# Patient Record
Sex: MALE | Race: WHITE
[De-identification: names, ages, dates, MRNs, and addresses within clinical notes are randomized per-mention and may not be internally consistent; named-entity substitution may affect disease eponyms.]

---

## 2018-05-20 ENCOUNTER — TRANSCRIPTION ENCOUNTER (OUTPATIENT)
Age: 68
End: 2018-05-20

## 2018-06-06 ENCOUNTER — INPATIENT (INPATIENT)
Facility: HOSPITAL | Age: 68
LOS: 2 days | Discharge: HOME | DRG: 603 | End: 2018-06-09
Attending: INTERNAL MEDICINE | Admitting: INTERNAL MEDICINE
Payer: MEDICARE

## 2018-06-06 VITALS
OXYGEN SATURATION: 98 % | SYSTOLIC BLOOD PRESSURE: 173 MMHG | DIASTOLIC BLOOD PRESSURE: 83 MMHG | HEART RATE: 83 BPM | TEMPERATURE: 98 F | WEIGHT: 210.1 LBS | RESPIRATION RATE: 18 BRPM

## 2018-06-06 DIAGNOSIS — L03.119 CELLULITIS OF UNSPECIFIED PART OF LIMB: ICD-10-CM

## 2018-06-06 LAB
ALBUMIN SERPL ELPH-MCNC: 3.8 G/DL — SIGNIFICANT CHANGE UP (ref 3.3–5)
ALP SERPL-CCNC: 69 U/L — SIGNIFICANT CHANGE UP (ref 40–120)
ALT FLD-CCNC: 5 U/L — LOW (ref 10–45)
ANION GAP SERPL CALC-SCNC: 14 MMOL/L — SIGNIFICANT CHANGE UP (ref 5–17)
APTT BLD: 33.5 SEC — SIGNIFICANT CHANGE UP (ref 27.5–37.4)
AST SERPL-CCNC: 13 U/L — SIGNIFICANT CHANGE UP (ref 10–40)
BASOPHILS # BLD AUTO: 0 K/UL — SIGNIFICANT CHANGE UP (ref 0–0.2)
BASOPHILS NFR BLD AUTO: 0.6 % — SIGNIFICANT CHANGE UP (ref 0–2)
BILIRUB SERPL-MCNC: 0.4 MG/DL — SIGNIFICANT CHANGE UP (ref 0.2–1.2)
BUN SERPL-MCNC: 35 MG/DL — HIGH (ref 7–23)
CALCIUM SERPL-MCNC: 8.9 MG/DL — SIGNIFICANT CHANGE UP (ref 8.4–10.5)
CHLORIDE SERPL-SCNC: 97 MMOL/L — SIGNIFICANT CHANGE UP (ref 96–108)
CK SERPL-CCNC: 98 U/L — SIGNIFICANT CHANGE UP (ref 30–200)
CO2 SERPL-SCNC: 30 MMOL/L — SIGNIFICANT CHANGE UP (ref 22–31)
CREAT SERPL-MCNC: 1.51 MG/DL — HIGH (ref 0.5–1.3)
EOSINOPHIL # BLD AUTO: 0.2 K/UL — SIGNIFICANT CHANGE UP (ref 0–0.5)
EOSINOPHIL NFR BLD AUTO: 2.6 % — SIGNIFICANT CHANGE UP (ref 0–6)
GAS PNL BLDV: SIGNIFICANT CHANGE UP
GLUCOSE BLDC GLUCOMTR-MCNC: 131 MG/DL — HIGH (ref 70–99)
GLUCOSE SERPL-MCNC: 165 MG/DL — HIGH (ref 70–99)
HCT VFR BLD CALC: 38.7 % — LOW (ref 39–50)
HGB BLD-MCNC: 12.4 G/DL — LOW (ref 13–17)
INR BLD: 1.14 RATIO — SIGNIFICANT CHANGE UP (ref 0.88–1.16)
LYMPHOCYTES # BLD AUTO: 1.5 K/UL — SIGNIFICANT CHANGE UP (ref 1–3.3)
LYMPHOCYTES # BLD AUTO: 20.3 % — SIGNIFICANT CHANGE UP (ref 13–44)
MCHC RBC-ENTMCNC: 28.1 PG — SIGNIFICANT CHANGE UP (ref 27–34)
MCHC RBC-ENTMCNC: 32 GM/DL — SIGNIFICANT CHANGE UP (ref 32–36)
MCV RBC AUTO: 87.9 FL — SIGNIFICANT CHANGE UP (ref 80–100)
MONOCYTES # BLD AUTO: 0.4 K/UL — SIGNIFICANT CHANGE UP (ref 0–0.9)
MONOCYTES NFR BLD AUTO: 5.8 % — SIGNIFICANT CHANGE UP (ref 2–14)
NEUTROPHILS # BLD AUTO: 5.4 K/UL — SIGNIFICANT CHANGE UP (ref 1.8–7.4)
NEUTROPHILS NFR BLD AUTO: 70.8 % — SIGNIFICANT CHANGE UP (ref 43–77)
PLATELET # BLD AUTO: 269 K/UL — SIGNIFICANT CHANGE UP (ref 150–400)
POTASSIUM SERPL-MCNC: 4.2 MMOL/L — SIGNIFICANT CHANGE UP (ref 3.5–5.3)
POTASSIUM SERPL-SCNC: 4.2 MMOL/L — SIGNIFICANT CHANGE UP (ref 3.5–5.3)
PROT SERPL-MCNC: 7.2 G/DL — SIGNIFICANT CHANGE UP (ref 6–8.3)
PROTHROM AB SERPL-ACNC: 12.5 SEC — SIGNIFICANT CHANGE UP (ref 9.8–12.7)
RBC # BLD: 4.4 M/UL — SIGNIFICANT CHANGE UP (ref 4.2–5.8)
RBC # FLD: 12.4 % — SIGNIFICANT CHANGE UP (ref 10.3–14.5)
SODIUM SERPL-SCNC: 141 MMOL/L — SIGNIFICANT CHANGE UP (ref 135–145)
WBC # BLD: 7.6 K/UL — SIGNIFICANT CHANGE UP (ref 3.8–10.5)
WBC # FLD AUTO: 7.6 K/UL — SIGNIFICANT CHANGE UP (ref 3.8–10.5)

## 2018-06-06 PROCEDURE — 99285 EMERGENCY DEPT VISIT HI MDM: CPT | Mod: GC

## 2018-06-06 PROCEDURE — 93971 EXTREMITY STUDY: CPT | Mod: 26

## 2018-06-06 RX ORDER — FUROSEMIDE 40 MG
20 TABLET ORAL DAILY
Qty: 0 | Refills: 0 | Status: DISCONTINUED | OUTPATIENT
Start: 2018-06-06 | End: 2018-06-06

## 2018-06-06 RX ORDER — ACETAMINOPHEN 500 MG
1000 TABLET ORAL ONCE
Qty: 0 | Refills: 0 | Status: COMPLETED | OUTPATIENT
Start: 2018-06-06 | End: 2018-06-06

## 2018-06-06 RX ORDER — GABAPENTIN 400 MG/1
300 CAPSULE ORAL DAILY
Qty: 0 | Refills: 0 | Status: DISCONTINUED | OUTPATIENT
Start: 2018-06-06 | End: 2018-06-09

## 2018-06-06 RX ORDER — FUROSEMIDE 40 MG
40 TABLET ORAL DAILY
Qty: 0 | Refills: 0 | Status: DISCONTINUED | OUTPATIENT
Start: 2018-06-06 | End: 2018-06-07

## 2018-06-06 RX ORDER — ENOXAPARIN SODIUM 100 MG/ML
40 INJECTION SUBCUTANEOUS DAILY
Qty: 0 | Refills: 0 | Status: DISCONTINUED | OUTPATIENT
Start: 2018-06-06 | End: 2018-06-09

## 2018-06-06 RX ORDER — DEXTROSE 50 % IN WATER 50 %
25 SYRINGE (ML) INTRAVENOUS ONCE
Qty: 0 | Refills: 0 | Status: DISCONTINUED | OUTPATIENT
Start: 2018-06-06 | End: 2018-06-09

## 2018-06-06 RX ORDER — SODIUM CHLORIDE 9 MG/ML
1000 INJECTION INTRAMUSCULAR; INTRAVENOUS; SUBCUTANEOUS ONCE
Qty: 0 | Refills: 0 | Status: COMPLETED | OUTPATIENT
Start: 2018-06-06 | End: 2018-06-06

## 2018-06-06 RX ORDER — DEXTROSE 50 % IN WATER 50 %
12.5 SYRINGE (ML) INTRAVENOUS ONCE
Qty: 0 | Refills: 0 | Status: DISCONTINUED | OUTPATIENT
Start: 2018-06-06 | End: 2018-06-09

## 2018-06-06 RX ORDER — CEFAZOLIN SODIUM 1 G
1000 VIAL (EA) INJECTION EVERY 8 HOURS
Qty: 0 | Refills: 0 | Status: DISCONTINUED | OUTPATIENT
Start: 2018-06-06 | End: 2018-06-08

## 2018-06-06 RX ORDER — INSULIN LISPRO 100/ML
VIAL (ML) SUBCUTANEOUS
Qty: 0 | Refills: 0 | Status: DISCONTINUED | OUTPATIENT
Start: 2018-06-06 | End: 2018-06-09

## 2018-06-06 RX ORDER — DEXTROSE 50 % IN WATER 50 %
15 SYRINGE (ML) INTRAVENOUS ONCE
Qty: 0 | Refills: 0 | Status: DISCONTINUED | OUTPATIENT
Start: 2018-06-06 | End: 2018-06-09

## 2018-06-06 RX ORDER — GLUCAGON INJECTION, SOLUTION 0.5 MG/.1ML
1 INJECTION, SOLUTION SUBCUTANEOUS ONCE
Qty: 0 | Refills: 0 | Status: DISCONTINUED | OUTPATIENT
Start: 2018-06-06 | End: 2018-06-09

## 2018-06-06 RX ORDER — VANCOMYCIN HCL 1 G
1000 VIAL (EA) INTRAVENOUS ONCE
Qty: 0 | Refills: 0 | Status: COMPLETED | OUTPATIENT
Start: 2018-06-06 | End: 2018-06-06

## 2018-06-06 RX ORDER — ASPIRIN/CALCIUM CARB/MAGNESIUM 324 MG
81 TABLET ORAL DAILY
Qty: 0 | Refills: 0 | Status: DISCONTINUED | OUTPATIENT
Start: 2018-06-06 | End: 2018-06-09

## 2018-06-06 RX ORDER — AMLODIPINE BESYLATE 2.5 MG/1
5 TABLET ORAL DAILY
Qty: 0 | Refills: 0 | Status: DISCONTINUED | OUTPATIENT
Start: 2018-06-06 | End: 2018-06-06

## 2018-06-06 RX ORDER — LOSARTAN POTASSIUM 100 MG/1
50 TABLET, FILM COATED ORAL DAILY
Qty: 0 | Refills: 0 | Status: DISCONTINUED | OUTPATIENT
Start: 2018-06-06 | End: 2018-06-08

## 2018-06-06 RX ORDER — OXYCODONE AND ACETAMINOPHEN 5; 325 MG/1; MG/1
1 TABLET ORAL EVERY 12 HOURS
Qty: 0 | Refills: 0 | Status: DISCONTINUED | OUTPATIENT
Start: 2018-06-06 | End: 2018-06-09

## 2018-06-06 RX ORDER — ENOXAPARIN SODIUM 100 MG/ML
40 INJECTION SUBCUTANEOUS DAILY
Qty: 0 | Refills: 0 | Status: DISCONTINUED | OUTPATIENT
Start: 2018-06-06 | End: 2018-06-06

## 2018-06-06 RX ORDER — METOPROLOL TARTRATE 50 MG
25 TABLET ORAL DAILY
Qty: 0 | Refills: 0 | Status: DISCONTINUED | OUTPATIENT
Start: 2018-06-06 | End: 2018-06-09

## 2018-06-06 RX ORDER — SODIUM CHLORIDE 9 MG/ML
1000 INJECTION, SOLUTION INTRAVENOUS
Qty: 0 | Refills: 0 | Status: DISCONTINUED | OUTPATIENT
Start: 2018-06-06 | End: 2018-06-09

## 2018-06-06 RX ORDER — ZOLPIDEM TARTRATE 10 MG/1
5 TABLET ORAL AT BEDTIME
Qty: 0 | Refills: 0 | Status: DISCONTINUED | OUTPATIENT
Start: 2018-06-06 | End: 2018-06-09

## 2018-06-06 RX ADMIN — Medication 25 MILLIGRAM(S): at 20:02

## 2018-06-06 RX ADMIN — GABAPENTIN 300 MILLIGRAM(S): 400 CAPSULE ORAL at 23:17

## 2018-06-06 RX ADMIN — Medication 1000 MILLIGRAM(S): at 15:31

## 2018-06-06 RX ADMIN — Medication 400 MILLIGRAM(S): at 15:31

## 2018-06-06 RX ADMIN — OXYCODONE AND ACETAMINOPHEN 1 TABLET(S): 5; 325 TABLET ORAL at 23:47

## 2018-06-06 RX ADMIN — OXYCODONE AND ACETAMINOPHEN 1 TABLET(S): 5; 325 TABLET ORAL at 23:17

## 2018-06-06 RX ADMIN — SODIUM CHLORIDE 2000 MILLILITER(S): 9 INJECTION INTRAMUSCULAR; INTRAVENOUS; SUBCUTANEOUS at 15:31

## 2018-06-06 RX ADMIN — Medication 100 MILLIGRAM(S): at 23:17

## 2018-06-06 RX ADMIN — LOSARTAN POTASSIUM 50 MILLIGRAM(S): 100 TABLET, FILM COATED ORAL at 20:02

## 2018-06-06 RX ADMIN — Medication 250 MILLIGRAM(S): at 16:51

## 2018-06-06 RX ADMIN — Medication 40 MILLIGRAM(S): at 23:17

## 2018-06-06 NOTE — ED ADULT NURSE NOTE - CHPI ED SYMPTOMS NEG
no diarrhea/no headache/no chills/no fever/no rash/no shortness of breath/no vomiting/no decreased eating/drinking/no abdominal pain/no cough

## 2018-06-06 NOTE — ED ADULT NURSE REASSESSMENT NOTE - NS ED NURSE REASSESS COMMENT FT1
1815 report given to RN on floor pt never took any blood pressure meds prior to [facility] hospitalization at [facility] and [facility] normal blood pressure id in the 150s No meds given at this time for pt has not taken any meds outside the hospital Rn on the floor aware  pending transport  Shavonne

## 2018-06-06 NOTE — ED ADULT NURSE NOTE - OBJECTIVE STATEMENT
pt comes in a wheelchair bound for Polio as a child comes in with left leg Cellulitis treated as [place] for 9 days and d/c home on PO antibiotics and still red and swollen Pt refused to get in the bed initially and take off his pants and shirt.  Finally pt in the bed and large gown given pt still would not take off his shirt IV placed in the forearm and bloods and meds given as ordered Esauorn

## 2018-06-06 NOTE — ED PROVIDER NOTE - MEDICAL DECISION MAKING DETAILS
68M with left leg swelling/pain/redness in context of recent admission for cellulitis, symptoms worsening after doxycycline - cellulitis failing outpt therapy, ?vascular cause, anticipate admission. 68M with left leg swelling/pain/redness in context of recent admission for cellulitis, symptoms worsening after doxycycline - cellulitis failing outpt therapy, ?vascular cause, anticipate admission.  Attending Denny: 69 y/o male recently admitted at [facility] for concern for cellulitis. per pt extensive work up at [facility] and pt d/c on doxy. no sig improvement and pt reported worsening pain to left leg. no falls or trauma. on exam no crepitus. unlikely pyomyositis. pt with b/l LE swelling. denies any h/o CHF. pt failing o/p treatment for cellulitis. able to range joints making septic joint less likely. plan to admit

## 2018-06-06 NOTE — H&P ADULT - ASSESSMENT
pt with h/o htn, pt stopped  taking bp meds,  h/o  dm   admitted  with b/l leg cellulitis   on  iv ancef   labs in [facility], pending, h/o  sob/ cp, better now  dvt  ppx pt with h/o htn, pt stopped  taking bp meds,  h/o  dm   admitted  with b/l leg cellulitis/ edema   on  iv ancef  iv lasix   doppler  legs   labs in am,   echo, pending, h/o  sob/ cp, better now  dvt  ppx pt with h/o htn, pt stopped  taking bp meds,  h/o  dm,  polio, has  motorized  chair, morbid  obesity   admitted  with b/l leg cellulitis/, left  greater  than right/ edema   on  iv ancef  iv lasix   doppler  legs, no  dvt   labs in am,   echo, pending, h/o  sob/ cp, better now  dvt  ppx  may  be  c/c  stasis, pt   frustrated

## 2018-06-06 NOTE — ED ADULT TRIAGE NOTE - CHIEF COMPLAINT QUOTE
worsening left lower leg pain and swelling, was recently admitted to [facility] for 9 days for treatment of lower left leg cellulitis. not currently on any abx.

## 2018-06-06 NOTE — H&P ADULT - NSHPPHYSICALEXAM_GEN_ALL_CORE
PHYSICAL EXAMINATION:  Vital Signs Last 24 Hrs  T(C): 36.6 (06 Jun 2018 19:56), Max: 36.7 (06 Jun 2018 18:13)  T(F): 97.8 (06 Jun 2018 19:56), Max: 98 (06 Jun 2018 18:13)  HR: 75 (06 Jun 2018 19:56) (75 - 89)  BP: 189/98 (06 Jun 2018 19:56) (161/75 - 189/98)  BP(mean): --  RR: 18 (06 Jun 2018 19:56) (16 - 18)  SpO2: 97% (06 Jun 2018 19:56) (97% - 100%)  CAPILLARY BLOOD GLUCOSE            GENERAL: NAD, well-groomed,  HEAD:  atraumatic, normocephalic  EYES: sclera anicteric  ENMT: mucous membranes moist  NECK: supple, No JVD  CHEST/LUNG: clear to auscultation bilaterally;    no      rales   ,   no rhonchi,   HEART: normal S1, S2  ABDOMEN: BS+, soft, ND, NT   EXTREMITIES:    below  knee,    edema    b/l LEs  NEURO: awake, ,     moves all extremities  SKIN: no     rash PHYSICAL EXAMINATION:  Vital Signs Last 24 Hrs  T(C): 36.6 (06 Jun 2018 19:56), Max: 36.7 (06 Jun 2018 18:13)  T(F): 97.8 (06 Jun 2018 19:56), Max: 98 (06 Jun 2018 18:13)  HR: 75 (06 Jun 2018 19:56) (75 - 89)  BP: 189/98 (06 Jun 2018 19:56) (161/75 - 189/98)  BP(mean): --  RR: 18 (06 Jun 2018 19:56) (16 - 18)  SpO2: 97% (06 Jun 2018 19:56) (97% - 100%)  CAPILLARY BLOOD GLUCOSE            GENERAL: NAD, well-groomed,  HEAD:  atraumatic, normocephalic  EYES: sclera anicteric  ENMT: mucous membranes moist. left   greater  than right  NEURO: awake, ,     moves all extremities  SKIN: no     rash

## 2018-06-06 NOTE — ED PROVIDER NOTE - PHYSICAL EXAMINATION
Attending Denny: Gen: NAD, heent: atrauamtic, eomi, perrla, mmm, op pink, uvula midline, neck; nttp, no nuchal rigidity, chest: nttp, no crepitus, cv: rrr, no murmurs, lungs: ctab, abd: soft, nontender, nondistended, no peritoneal signs, +BS, no guarding, ext: wwp, neg homans, skin: erythema and warmth to left leg, compartments soft, no crepitus, atrophy of lower extremities, neuro: awake and alert, following commands, speech clear, sensation and strength intact, no focal deficits

## 2018-06-06 NOTE — ED PROVIDER NOTE - MUSCULOSKELETAL, MLM
Left leg with with redness, swelling, mild calf tenderness, overlying venous stasis changes. Small skin tear between toe on left side.

## 2018-06-06 NOTE — H&P ADULT - NSHPREVIEWOFSYSTEMS_GEN_ALL_CORE
REVIEW OF SYSTEMS:  CONSTITUTIONAL: No weakness,  no   fevers      RESPIRATORY:  No    shortness of breath  , no  wheezing  CARDIOVASCULAR: No chest pain,   no  palpitations, no  cough  GASTROINTESTINAL: No abdominal  pain, no  vomiting, no  diarrhea  NEUROLOGICAL: No  focal  weakness

## 2018-06-06 NOTE — ED PROVIDER NOTE - CARE PLAN
Principal Discharge DX:	Cellulitis and abscess of leg  Secondary Diagnosis:	Failure of outpatient treatment

## 2018-06-06 NOTE — H&P ADULT - NSHPLABSRESULTS_GEN_ALL_CORE
LABS:                        12.4   7.6   )-----------( 269      ( 06 Jun 2018 15:29 )             38.7     06-06    141  |  97  |  35<H>  ----------------------------<  165<H>  4.2   |  30  |  1.51<H>    Ca    8.9      06 Jun 2018 15:29    TPro  7.2  /  Alb  3.8  /  TBili  0.4  /  DBili  x   /  AST  13  /  ALT  5<L>  /  AlkPhos  69  06-06    PT/INR - ( 06 Jun 2018 15:29 )   PT: 12.5 sec;   INR: 1.14 ratio         PTT - ( 06 Jun 2018 15:29 )  PTT:33.5 sec  CARDIAC MARKERS ( 06 Jun 2018 15:29 )  x     / x     / 98 U/L / x     / x                06-06 @ 15:29  4.1  25

## 2018-06-06 NOTE — CONSULT NOTE ADULT - ASSESSMENT
pt with hx of polio with possible cellulitis who presented to et with increasing le edema and redness with possible cellulitis  pt also with hypertensive heart disease on no meds  add beta blocker/ace  doppler  abx as per ID  asa daily

## 2018-06-06 NOTE — CONSULT NOTE ADULT - SUBJECTIVE AND OBJECTIVE BOX
CHIEF COMPLAINT:Patient is a 68y old  Male who presents with a chief complaint of le edema/chest pain/sob.    HPI:68 year old man PMH DM, HTN, wheelchair bound from childhood polio p/w left leg pain. Was discharged a week ago from [facility] after admission for LLE cellulitis and fever. Was on doxycycline post discharge and completed course, but says that his leg has developed worsening swelling, pain and redness. No fevers, chest pain, shortness of breath. Says he also had ultrasounds and CT that were negative at [facility].  pt also c/o chest pain for the last few months was given meds for chf and htn and is not taking it.  last cardiac work up years ago.      PAST MEDICAL & SURGICAL HISTORY:  Polio      MEDICATIONS  (STANDING):  noted  MEDICATIONS  (PRN):      FAMILY HISTORY:      SOCIAL HISTORY:    [ ] Non-smoker  [ ] Smoker  [ ] Alcohol    Allergies    No Known Allergies    Intolerances    	    REVIEW OF SYSTEMS:  CONSTITUTIONAL: No fever, weight loss, or fatigue  EYES: No eye pain, visual disturbances, or discharge  ENT:  No difficulty hearing, tinnitus, vertigo; No sinus or throat pain  NECK: No pain or stiffness  RESPIRATORY: No cough, wheezing, chills or hemoptysis; + Shortness of Breath  CARDIOVASCULAR: + chest pain, palpitations, passing out, dizziness, or leg swelling  GASTROINTESTINAL: No abdominal or epigastric pain. No nausea, vomiting, or hematemesis; No diarrhea or constipation. No melena or hematochezia.  GENITOURINARY: No dysuria, frequency, hematuria, or incontinence  NEUROLOGICAL: No headaches, memory loss, loss of strength, numbness, or tremors  SKIN: No itching, burning, rashes, or lesions   LYMPH Nodes: No enlarged glands  ENDOCRINE: No heat or cold intolerance; No hair loss  MUSCULOSKELETAL: No joint pain or swelling; No muscle, back, or extremity pain  PSYCHIATRIC: No depression, anxiety, mood swings, or difficulty sleeping  HEME/LYMPH: No easy bruising, or bleeding gums  ALLERGY AND IMMUNOLOGIC: No hives or eczema	    [ ] All others negative	  [ ] Unable to obtain    PHYSICAL EXAM:  T(C): 36.6 (06-06-18 @ 14:23), Max: 36.6 (06-06-18 @ 14:23)  HR: 83 (06-06-18 @ 14:23) (83 - 83)  BP: 173/83 (06-06-18 @ 14:23) (173/83 - 173/83)  RR: 18 (06-06-18 @ 14:23) (18 - 18)  SpO2: 98% (06-06-18 @ 14:23) (98% - 98%)  Wt(kg): --  I&O's Summary      Appearance: Normal	  HEENT:   Normal oral mucosa, PERRL, EOMI	  Lymphatic: No lymphadenopathy  Cardiovascular: Normal S1 S2, No JVD, + murmurs, No edema  Respiratory: Lungs clear to auscultation	  Psychiatry: A & O x 3, Mood & affect appropriate  Gastrointestinal:  Soft, Non-tender, + BS	  Skin: No rashes, No ecchymoses, No cyanosis	  Neurologic: Non-focal  Extremities: Normal range of motion, No clubbing, cyanosis , + polio, + edema  Vascular: Peripheral pulses palpable 2+ bilaterally, erythema    TELEMETRY: 	    ECG:  	  RADIOLOGY:  OTHER: 	  	  LABS:	 	    CARDIAC MARKERS:  CARDIAC MARKERS ( 06 Jun 2018 15:29 )  x     / x     / 98 U/L / x     / x                                  12.4   7.6   )-----------( 269      ( 06 Jun 2018 15:29 )             38.7     06-06    141  |  97  |  35<H>  ----------------------------<  165<H>  4.2   |  30  |  1.51<H>    Ca    8.9      06 Jun 2018 15:29    TPro  7.2  /  Alb  3.8  /  TBili  0.4  /  DBili  x   /  AST  13  /  ALT  5<L>  /  AlkPhos  69  06-06    proBNP:   Lipid Profile:   HgA1c:   TSH:   PT/INR - ( 06 Jun 2018 15:29 )   PT: 12.5 sec;   INR: 1.14 ratio         PTT - ( 06 Jun 2018 15:29 )  PTT:33.5 sec    PREVIOUS DIAGNOSTIC TESTING:      ecg nsr septal infarct

## 2018-06-06 NOTE — ED ADULT NURSE NOTE - CHIEF COMPLAINT QUOTE
worsening left lower leg pain and swelling, was recently admitted to [facility] for 9 days for treatment of lower left leg cellulitis. not currently on any abx.

## 2018-06-06 NOTE — H&P ADULT - HISTORY OF PRESENT ILLNESS
High Risk Travel:  International Travel? No(1)    	  : 68 year old man PMH DM, HTN, wheelchair bound from childhood polio p/w left leg pain also  has  h/o  vague  cp/  sob . Was discharged a week ago from [facility] after admission for LLE cellulitis and fever.  Was on doxycycline post discharge and completed course, but says that his leg has developed worsening swelling, pain and redness. No fevers, chest pain, shortness of breath. Says he also had ultrasounds and CT that were negative at [facility].

## 2018-06-06 NOTE — ED PROVIDER NOTE - OBJECTIVE STATEMENT
68 year old man PMH DM, HTN, wheelchair bound from childhood polio p/w left leg pain. Was discharged a week ago from [facility] after admission for LLE cellulitis and fever. Was on doxycycline post discharge and completed course, but says that his leg has developed worsening swelling, pain and redness. No fevers, chest pain, shortness of breath. Says he also had ultrasounds and CT that were negative at [facility].

## 2018-06-07 LAB
ANION GAP SERPL CALC-SCNC: 12 MMOL/L — SIGNIFICANT CHANGE UP (ref 5–17)
BUN SERPL-MCNC: 33 MG/DL — HIGH (ref 7–23)
CALCIUM SERPL-MCNC: 8.7 MG/DL — SIGNIFICANT CHANGE UP (ref 8.4–10.5)
CHLORIDE SERPL-SCNC: 103 MMOL/L — SIGNIFICANT CHANGE UP (ref 96–108)
CHOLEST SERPL-MCNC: 158 MG/DL — SIGNIFICANT CHANGE UP (ref 10–199)
CO2 SERPL-SCNC: 28 MMOL/L — SIGNIFICANT CHANGE UP (ref 22–31)
CREAT SERPL-MCNC: 1.47 MG/DL — HIGH (ref 0.5–1.3)
GLUCOSE SERPL-MCNC: 142 MG/DL — HIGH (ref 70–99)
HBA1C BLD-MCNC: 6.6 % — HIGH (ref 4–5.6)
HCT VFR BLD CALC: 35.4 % — LOW (ref 39–50)
HDLC SERPL-MCNC: 37 MG/DL — LOW (ref 40–125)
HGB BLD-MCNC: 11.2 G/DL — LOW (ref 13–17)
LIPID PNL WITH DIRECT LDL SERPL: 91 MG/DL — SIGNIFICANT CHANGE UP
MCHC RBC-ENTMCNC: 27.8 PG — SIGNIFICANT CHANGE UP (ref 27–34)
MCHC RBC-ENTMCNC: 31.6 GM/DL — LOW (ref 32–36)
MCV RBC AUTO: 87.8 FL — SIGNIFICANT CHANGE UP (ref 80–100)
PLATELET # BLD AUTO: 252 K/UL — SIGNIFICANT CHANGE UP (ref 150–400)
POTASSIUM SERPL-MCNC: 4 MMOL/L — SIGNIFICANT CHANGE UP (ref 3.5–5.3)
POTASSIUM SERPL-SCNC: 4 MMOL/L — SIGNIFICANT CHANGE UP (ref 3.5–5.3)
RBC # BLD: 4.03 M/UL — LOW (ref 4.2–5.8)
RBC # FLD: 13.6 % — SIGNIFICANT CHANGE UP (ref 10.3–14.5)
SODIUM SERPL-SCNC: 143 MMOL/L — SIGNIFICANT CHANGE UP (ref 135–145)
TOTAL CHOLESTEROL/HDL RATIO MEASUREMENT: 4.3 RATIO — SIGNIFICANT CHANGE UP (ref 3.4–9.6)
TRIGL SERPL-MCNC: 148 MG/DL — SIGNIFICANT CHANGE UP (ref 10–149)
TSH SERPL-MCNC: 2.39 UIU/ML — SIGNIFICANT CHANGE UP (ref 0.27–4.2)
WBC # BLD: 6.82 K/UL — SIGNIFICANT CHANGE UP (ref 3.8–10.5)
WBC # FLD AUTO: 6.82 K/UL — SIGNIFICANT CHANGE UP (ref 3.8–10.5)

## 2018-06-07 PROCEDURE — 93306 TTE W/DOPPLER COMPLETE: CPT | Mod: 26

## 2018-06-07 PROCEDURE — 73610 X-RAY EXAM OF ANKLE: CPT | Mod: 26,LT

## 2018-06-07 PROCEDURE — 73562 X-RAY EXAM OF KNEE 3: CPT | Mod: 26,LT

## 2018-06-07 PROCEDURE — 73590 X-RAY EXAM OF LOWER LEG: CPT | Mod: 26,LT

## 2018-06-07 RX ORDER — FUROSEMIDE 40 MG
40 TABLET ORAL
Qty: 0 | Refills: 0 | Status: DISCONTINUED | OUTPATIENT
Start: 2018-06-07 | End: 2018-06-09

## 2018-06-07 RX ADMIN — Medication 81 MILLIGRAM(S): at 12:57

## 2018-06-07 RX ADMIN — Medication 100 MILLIGRAM(S): at 06:40

## 2018-06-07 RX ADMIN — Medication 100 MILLIGRAM(S): at 14:00

## 2018-06-07 RX ADMIN — LOSARTAN POTASSIUM 50 MILLIGRAM(S): 100 TABLET, FILM COATED ORAL at 06:22

## 2018-06-07 RX ADMIN — OXYCODONE AND ACETAMINOPHEN 1 TABLET(S): 5; 325 TABLET ORAL at 23:29

## 2018-06-07 RX ADMIN — GABAPENTIN 300 MILLIGRAM(S): 400 CAPSULE ORAL at 12:57

## 2018-06-07 RX ADMIN — Medication 25 MILLIGRAM(S): at 06:22

## 2018-06-07 RX ADMIN — OXYCODONE AND ACETAMINOPHEN 1 TABLET(S): 5; 325 TABLET ORAL at 17:40

## 2018-06-07 RX ADMIN — Medication 40 MILLIGRAM(S): at 16:40

## 2018-06-07 RX ADMIN — Medication 100 MILLIGRAM(S): at 21:40

## 2018-06-07 RX ADMIN — OXYCODONE AND ACETAMINOPHEN 1 TABLET(S): 5; 325 TABLET ORAL at 16:40

## 2018-06-07 RX ADMIN — Medication 40 MILLIGRAM(S): at 06:22

## 2018-06-07 NOTE — PROVIDER CONTACT NOTE (OTHER) - ASSESSMENT
Pt agitated in bed, refusing FS, states he castorena snot want ADA diet or FS, Pt becoming increasingly agitated and yelling at RN

## 2018-06-07 NOTE — CONSULT NOTE ADULT - ASSESSMENT
68 year old male with hx of polio wheel chair bound, recent admission at outside hospital was dx with LLE cellulitis reports he was there for nine days and then followed by a four day of doxy and keflex. He now c/o LLE pain. He has no fever and his white cell count is normal, he is non toxic appearing. In my opinion he has received and adequate course of abx for cellulitis, the current erythema on his leg may also be a venous stasis dermatitis.   plan   Recommend getting ortho to comment on his leg pain, he may need further imaging of his left leg to determine cause of pain will defer to medicine   Ancef started empiric by team continue for now pending further input by medical team

## 2018-06-07 NOTE — CHART NOTE - NSCHARTNOTEFT_GEN_A_CORE
Pt with c/o LLE pain from foot to calf, along with "purple" discoloration.   Pt examined at bedside. LLE noted to be dark red and not purple as   stated by pt. Unable to palpate DP & PT pulses.  D/w Dr Singleton. Orthopedic and Vascular Consults called. KATRIN/PVR ordered  as recommended by Vascular MD. Xr Lt knee, Lt tib/fib and Lt ankle ordered.  Per Dr Pardo - Ortho MD, all Xrays to be resulted and Ortho recalled to see pt   after results are obtained. Dr Chapman ([facility] MD0 to see pt.  Endorsed to night LIP.

## 2018-06-07 NOTE — PROVIDER CONTACT NOTE (OTHER) - SITUATION
Pt a&ox4, pmh DM2, refusing FS/insulin. Earlier this shift refused ADA diet and requested regular diet. Diet changed per NP

## 2018-06-07 NOTE — CONSULT NOTE ADULT - SUBJECTIVE AND OBJECTIVE BOX
HPI:   Patient is a 68y male with 68 year old man PMH DM, HTN, wheelchair bound from childhood polio p/w left leg pain was discharged a week ago from [facility] after admission for LLE cellulitis and fever. The patient reports that he was in [facility] for nine days, he reports that he saw an infectious disease md and vascular surgery while he was there. He then continued to take oral antibiotics doxycycline and keflex for four more days which he reports that he completed. [facility]     REVIEW OF SYSTEMS:  All other review of systems negative (Comprehensive ROS)    PAST MEDICAL & SURGICAL HISTORY:  Polio      Allergies    No Known Allergies    Intolerances        Antimicrobials Day #    ceFAZolin   IVPB 1000 milliGRAM(s) IV Intermittent every 8 hours    Other Medications:  aspirin enteric coated 81 milliGRAM(s) Oral daily  dextrose 40% Gel 15 Gram(s) Oral once PRN  dextrose 5%. 1000 milliLiter(s) IV Continuous <Continuous>  dextrose 50% Injectable 12.5 Gram(s) IV Push once  dextrose 50% Injectable 25 Gram(s) IV Push once  dextrose 50% Injectable 25 Gram(s) IV Push once  enoxaparin Injectable 40 milliGRAM(s) SubCutaneous daily  furosemide   Injectable 40 milliGRAM(s) IV Push two times a day  gabapentin 300 milliGRAM(s) Oral daily  glucagon  Injectable 1 milliGRAM(s) IntraMuscular once PRN  insulin lispro (HumaLOG) corrective regimen sliding scale   SubCutaneous three times a day before meals  losartan 50 milliGRAM(s) Oral daily  metoprolol succinate ER 25 milliGRAM(s) Oral daily  oxyCODONE    5 mG/acetaminophen 325 mG 1 Tablet(s) Oral every 12 hours PRN  zolpidem 5 milliGRAM(s) Oral at bedtime      FAMILY HISTORY: non contributory       SOCIAL HISTORY:  Smoking:  non smoker     T(F): 97.8 (06-07-18 @ 12:36), Max: 98.7 (06-06-18 @ 22:01)  HR: 72 (06-07-18 @ 12:36)  BP: 175/82 (06-07-18 @ 12:36)  RR: 18 (06-07-18 @ 12:36)  SpO2: 98% (06-07-18 @ 12:36)  Wt(kg): --    PHYSICAL EXAM:  General: alert, no acute distress  Eyes:  anicteric, no conjunctival injection, no discharge  Oropharynx: no lesions or injection 	  Neck: supple, without adenopathy  Lungs: clear to auscultation  Heart: regular rate and rhythm; no murmur, rubs or gallops  Abdomen: soft, nondistended, nontender, without mass or organomegaly  Skin: no lesions  Extremities: no clubbing, cyanosis, or edema  Neurologic: alert, oriented, moves all extremities    LAB RESULTS:                        11.2   6.82  )-----------( 252      ( 07 Jun 2018 07:39 )             35.4     06-07    143  |  103  |  33<H>  ----------------------------<  142<H>  4.0   |  28  |  1.47<H>    Ca    8.7      07 Jun 2018 05:30    TPro  7.2  /  Alb  3.8  /  TBili  0.4  /  DBili  x   /  AST  13  /  ALT  5<L>  /  AlkPhos  69  06-06    LIVER FUNCTIONS - ( 06 Jun 2018 15:29 )  Alb: 3.8 g/dL / Pro: 7.2 g/dL / ALK PHOS: 69 U/L / ALT: 5 U/L / AST: 13 U/L / GGT: x               MICROBIOLOGY:  RECENT CULTURES:        RADIOLOGY REVIEWED:

## 2018-06-08 PROCEDURE — 93923 UPR/LXTR ART STDY 3+ LVLS: CPT | Mod: 26

## 2018-06-08 PROCEDURE — 93970 EXTREMITY STUDY: CPT | Mod: 26

## 2018-06-08 PROCEDURE — 99221 1ST HOSP IP/OBS SF/LOW 40: CPT

## 2018-06-08 RX ORDER — CEPHALEXIN 500 MG
500 CAPSULE ORAL
Qty: 0 | Refills: 0 | Status: DISCONTINUED | OUTPATIENT
Start: 2018-06-08 | End: 2018-06-09

## 2018-06-08 RX ORDER — LOSARTAN POTASSIUM 100 MG/1
100 TABLET, FILM COATED ORAL DAILY
Qty: 0 | Refills: 0 | Status: DISCONTINUED | OUTPATIENT
Start: 2018-06-08 | End: 2018-06-09

## 2018-06-08 RX ADMIN — LOSARTAN POTASSIUM 50 MILLIGRAM(S): 100 TABLET, FILM COATED ORAL at 05:33

## 2018-06-08 RX ADMIN — Medication 100 MILLIGRAM(S): at 05:35

## 2018-06-08 RX ADMIN — Medication 25 MILLIGRAM(S): at 05:33

## 2018-06-08 RX ADMIN — Medication 40 MILLIGRAM(S): at 17:25

## 2018-06-08 RX ADMIN — LOSARTAN POTASSIUM 100 MILLIGRAM(S): 100 TABLET, FILM COATED ORAL at 17:24

## 2018-06-08 RX ADMIN — OXYCODONE AND ACETAMINOPHEN 1 TABLET(S): 5; 325 TABLET ORAL at 23:21

## 2018-06-08 RX ADMIN — Medication 500 MILLIGRAM(S): at 23:22

## 2018-06-08 RX ADMIN — Medication 81 MILLIGRAM(S): at 13:53

## 2018-06-08 RX ADMIN — Medication 500 MILLIGRAM(S): at 13:57

## 2018-06-08 RX ADMIN — GABAPENTIN 300 MILLIGRAM(S): 400 CAPSULE ORAL at 13:53

## 2018-06-08 RX ADMIN — Medication 500 MILLIGRAM(S): at 17:27

## 2018-06-08 RX ADMIN — Medication 40 MILLIGRAM(S): at 05:34

## 2018-06-08 RX ADMIN — OXYCODONE AND ACETAMINOPHEN 1 TABLET(S): 5; 325 TABLET ORAL at 00:24

## 2018-06-08 NOTE — DIETITIAN INITIAL EVALUATION ADULT. - ORAL INTAKE PTA
good/Pt reports good PO intake PTA, consumes a variety of foods. Did not wish to elaborate on diet recall. Confirms NKFA. Denies micronutrient supplementation. Pt denies chewing/swallowing difficulty, nausea, vomiting, diarrhea, constipation.

## 2018-06-08 NOTE — CONSULT NOTE ADULT - SUBJECTIVE AND OBJECTIVE BOX
HPI: 69 yo wheelchair bound man with HTN, DM presenting with bilateral LE cellulitis. Vascular surgery consulted to evaluate for PVD because patient was complaining of severe LE pain.     PMHx / PSHx: see HPI    Medications (inpatient): aspirin enteric coated 81 milliGRAM(s) Oral daily  ceFAZolin   IVPB 1000 milliGRAM(s) IV Intermittent every 8 hours  dextrose 5%. 1000 milliLiter(s) IV Continuous <Continuous>  dextrose 50% Injectable 12.5 Gram(s) IV Push once  dextrose 50% Injectable 25 Gram(s) IV Push once  dextrose 50% Injectable 25 Gram(s) IV Push once  enoxaparin Injectable 40 milliGRAM(s) SubCutaneous daily  furosemide   Injectable 40 milliGRAM(s) IV Push two times a day  gabapentin 300 milliGRAM(s) Oral daily  insulin lispro (HumaLOG) corrective regimen sliding scale   SubCutaneous three times a day before meals  losartan 50 milliGRAM(s) Oral daily  metoprolol succinate ER 25 milliGRAM(s) Oral daily  zolpidem 5 milliGRAM(s) Oral at bedtime    Medications (PRN):dextrose 40% Gel 15 Gram(s) Oral once PRN  glucagon  Injectable 1 milliGRAM(s) IntraMuscular once PRN  oxyCODONE    5 mG/acetaminophen 325 mG 1 Tablet(s) Oral every 12 hours PRN    Allergies: No Known Allergies  (Intolerances: )  Social Hx:     Physical Exam  T(C): 36.8 (06-08-18 @ 05:31)  HR: 67 (06-08-18 @ 05:31) (67 - 75)  BP: 155/67 (06-08-18 @ 05:31) (154/83 - 175/82)  RR: 17 (06-08-18 @ 05:31) (17 - 18)  SpO2: 95% (06-08-18 @ 05:31) (95% - 98%)  Tmax: T(C): , Max: 37.1 (06-07-18 @ 19:27)    06-07-18  -  06-08-18  --------------------------------------------------------  IN:    IV PiggyBack: 100 mL    Oral Fluid: 1070 mL  Total IN: 1170 mL    OUT:    Voided: 2100 mL  Total OUT: 2100 mL    Total NET: -930 mL        General: well developed, well nourished, NAD  Ext: Bilateral fem pulse. R DP / PT signal. Left monophasic AT.     Labs:                        11.2   6.82  )-----------( 252      ( 07 Jun 2018 07:39 )             35.4     PT/INR - ( 06 Jun 2018 15:29 )   PT: 12.5 sec;   INR: 1.14 ratio         PTT - ( 06 Jun 2018 15:29 )  PTT:33.5 sec  06-07    143  |  103  |  33<H>  ----------------------------<  142<H>  4.0   |  28  |  1.47<H>    Ca    8.7      07 Jun 2018 05:30    TPro  7.2  /  Alb  3.8  /  TBili  0.4  /  DBili  x   /  AST  13  /  ALT  5<L>  /  AlkPhos  69  06-06            Imaging and other studies:

## 2018-06-08 NOTE — DIETITIAN INITIAL EVALUATION ADULT. - ENERGY NEEDS
Ht: 69 inches, Wt: 209lbs, BMI: 31kg/m2, IBW: 160lbs +/- 10%, %IBW: 130%  +2 right/left leg Edema, Skin: free of pressure injuries   Pertinent Information: This 67 yo wheelchair bound man with HTN, DM presenting with bilateral LE cellulitis. Vascular surgery consulted to evaluate for PVD because patient was complaining of severe LE pain.

## 2018-06-08 NOTE — DIETITIAN INITIAL EVALUATION ADULT. - OTHER INFO
Patient seen for verbal consult. Patient denies any recent changes in weight loss. Reports UBW 210lbs with is consistent with admit weight of 209.6 pounds. Patient currently with no GI distress. Reports decreased PO intake in-house due to dislike of institutionalized food also noted missing items ordered on tray. Does not want to be placed on a therapeutic diet while in-house.  Writer obtained food preferences will honor as able. Last BM 6/6.

## 2018-06-08 NOTE — CONSULT NOTE ADULT - ASSESSMENT
69 yo wheelchair-bound man 2/2 polio, presenting with bilateral LE cellulitis and edema.     - KATRIN / PVR  - venous duplex  - Elevation, ACE bandage  - Discussed with Fellow, Dr. New, on behalf of Dr. Garfield Chapman MD PGY2  Vascular: [id number]

## 2018-06-08 NOTE — DIETITIAN INITIAL EVALUATION ADULT. - NS AS NUTRI INTERV ED CONTENT
Purpose of the nutrition education/deferred diet education, pt appeared frustrated with menu, let written materials at bedside.

## 2018-06-08 NOTE — DIETITIAN INITIAL EVALUATION ADULT. - NS AS NUTRI INTERV MEALS SNACK
Continue Regular diet per pt request. Reviewed withpt menu ordering procedures as well as additional cold menu items available on back of menu. Obtained food preferences, will honor as able./General/healthful diet

## 2018-06-09 ENCOUNTER — TRANSCRIPTION ENCOUNTER (OUTPATIENT)
Age: 68
End: 2018-06-09

## 2018-06-09 VITALS
DIASTOLIC BLOOD PRESSURE: 88 MMHG | SYSTOLIC BLOOD PRESSURE: 169 MMHG | TEMPERATURE: 98 F | OXYGEN SATURATION: 96 % | HEART RATE: 67 BPM | RESPIRATION RATE: 18 BRPM

## 2018-06-09 LAB
HCT VFR BLD CALC: 37 % — LOW (ref 39–50)
HGB BLD-MCNC: 11.5 G/DL — LOW (ref 13–17)
MCHC RBC-ENTMCNC: 27.1 PG — SIGNIFICANT CHANGE UP (ref 27–34)
MCHC RBC-ENTMCNC: 31.1 GM/DL — LOW (ref 32–36)
MCV RBC AUTO: 87.1 FL — SIGNIFICANT CHANGE UP (ref 80–100)
PLATELET # BLD AUTO: 255 K/UL — SIGNIFICANT CHANGE UP (ref 150–400)
RBC # BLD: 4.25 M/UL — SIGNIFICANT CHANGE UP (ref 4.2–5.8)
RBC # FLD: 13.8 % — SIGNIFICANT CHANGE UP (ref 10.3–14.5)
WBC # BLD: 7.65 K/UL — SIGNIFICANT CHANGE UP (ref 3.8–10.5)
WBC # FLD AUTO: 7.65 K/UL — SIGNIFICANT CHANGE UP (ref 3.8–10.5)

## 2018-06-09 PROCEDURE — 85730 THROMBOPLASTIN TIME PARTIAL: CPT

## 2018-06-09 PROCEDURE — 82330 ASSAY OF CALCIUM: CPT

## 2018-06-09 PROCEDURE — 83036 HEMOGLOBIN GLYCOSYLATED A1C: CPT

## 2018-06-09 PROCEDURE — 85027 COMPLETE CBC AUTOMATED: CPT

## 2018-06-09 PROCEDURE — 84132 ASSAY OF SERUM POTASSIUM: CPT

## 2018-06-09 PROCEDURE — 85610 PROTHROMBIN TIME: CPT

## 2018-06-09 PROCEDURE — 80053 COMPREHEN METABOLIC PANEL: CPT

## 2018-06-09 PROCEDURE — 96374 THER/PROPH/DIAG INJ IV PUSH: CPT

## 2018-06-09 PROCEDURE — 93306 TTE W/DOPPLER COMPLETE: CPT

## 2018-06-09 PROCEDURE — 93005 ELECTROCARDIOGRAM TRACING: CPT

## 2018-06-09 PROCEDURE — 82803 BLOOD GASES ANY COMBINATION: CPT

## 2018-06-09 PROCEDURE — 73610 X-RAY EXAM OF ANKLE: CPT

## 2018-06-09 PROCEDURE — 82550 ASSAY OF CK (CPK): CPT

## 2018-06-09 PROCEDURE — 82435 ASSAY OF BLOOD CHLORIDE: CPT

## 2018-06-09 PROCEDURE — 80061 LIPID PANEL: CPT

## 2018-06-09 PROCEDURE — 84443 ASSAY THYROID STIM HORMONE: CPT

## 2018-06-09 PROCEDURE — 80048 BASIC METABOLIC PNL TOTAL CA: CPT

## 2018-06-09 PROCEDURE — 87040 BLOOD CULTURE FOR BACTERIA: CPT

## 2018-06-09 PROCEDURE — 99285 EMERGENCY DEPT VISIT HI MDM: CPT | Mod: 25

## 2018-06-09 PROCEDURE — 82962 GLUCOSE BLOOD TEST: CPT

## 2018-06-09 PROCEDURE — 82947 ASSAY GLUCOSE BLOOD QUANT: CPT

## 2018-06-09 PROCEDURE — 93970 EXTREMITY STUDY: CPT

## 2018-06-09 PROCEDURE — 73562 X-RAY EXAM OF KNEE 3: CPT

## 2018-06-09 PROCEDURE — 93923 UPR/LXTR ART STDY 3+ LVLS: CPT

## 2018-06-09 PROCEDURE — 85014 HEMATOCRIT: CPT

## 2018-06-09 PROCEDURE — 84295 ASSAY OF SERUM SODIUM: CPT

## 2018-06-09 PROCEDURE — 93971 EXTREMITY STUDY: CPT

## 2018-06-09 PROCEDURE — 96375 TX/PRO/DX INJ NEW DRUG ADDON: CPT

## 2018-06-09 PROCEDURE — 73590 X-RAY EXAM OF LOWER LEG: CPT

## 2018-06-09 PROCEDURE — 83605 ASSAY OF LACTIC ACID: CPT

## 2018-06-09 RX ORDER — FUROSEMIDE 40 MG
60 TABLET ORAL DAILY
Qty: 0 | Refills: 0 | Status: DISCONTINUED | OUTPATIENT
Start: 2018-06-09 | End: 2018-06-09

## 2018-06-09 RX ORDER — METOPROLOL TARTRATE 50 MG
1 TABLET ORAL
Qty: 30 | Refills: 0 | OUTPATIENT
Start: 2018-06-09 | End: 2018-07-08

## 2018-06-09 RX ORDER — FUROSEMIDE 40 MG
3 TABLET ORAL
Qty: 90 | Refills: 0 | OUTPATIENT
Start: 2018-06-09 | End: 2018-07-08

## 2018-06-09 RX ORDER — LOSARTAN POTASSIUM 100 MG/1
1 TABLET, FILM COATED ORAL
Qty: 30 | Refills: 0 | OUTPATIENT
Start: 2018-06-09 | End: 2018-07-08

## 2018-06-09 RX ORDER — CEPHALEXIN 500 MG
1 CAPSULE ORAL
Qty: 8 | Refills: 0 | OUTPATIENT
Start: 2018-06-09 | End: 2018-06-10

## 2018-06-09 RX ORDER — ASPIRIN/CALCIUM CARB/MAGNESIUM 324 MG
1 TABLET ORAL
Qty: 30 | Refills: 0 | OUTPATIENT
Start: 2018-06-09 | End: 2018-07-08

## 2018-06-09 RX ORDER — ZOLPIDEM TARTRATE 10 MG/1
1 TABLET ORAL
Qty: 5 | Refills: 0 | OUTPATIENT
Start: 2018-06-09 | End: 2018-06-13

## 2018-06-09 RX ORDER — AMLODIPINE BESYLATE 2.5 MG/1
1 TABLET ORAL
Qty: 0 | Refills: 0 | COMMUNITY

## 2018-06-09 RX ADMIN — Medication 500 MILLIGRAM(S): at 11:44

## 2018-06-09 RX ADMIN — GABAPENTIN 300 MILLIGRAM(S): 400 CAPSULE ORAL at 11:52

## 2018-06-09 RX ADMIN — Medication 500 MILLIGRAM(S): at 06:37

## 2018-06-09 RX ADMIN — Medication 60 MILLIGRAM(S): at 11:50

## 2018-06-09 RX ADMIN — Medication 25 MILLIGRAM(S): at 06:37

## 2018-06-09 RX ADMIN — LOSARTAN POTASSIUM 100 MILLIGRAM(S): 100 TABLET, FILM COATED ORAL at 06:37

## 2018-06-09 RX ADMIN — Medication 40 MILLIGRAM(S): at 06:37

## 2018-06-09 RX ADMIN — OXYCODONE AND ACETAMINOPHEN 1 TABLET(S): 5; 325 TABLET ORAL at 00:00

## 2018-06-09 RX ADMIN — Medication 81 MILLIGRAM(S): at 11:44

## 2018-06-09 NOTE — PROGRESS NOTE ADULT - SUBJECTIVE AND OBJECTIVE BOX
c/c pain  left foot    REVIEW OF SYSTEMS:  CONSTITUTIONAL: No weakness,  no   fevers      RESPIRATORY:  No    shortness of breath  , no  wheezing  CARDIOVASCULAR: No chest pain,   no  palpitations, no  cough  GASTROINTESTINAL: No abdominal  pain, no  vomiting, no  diarrhea  NEUROLOGICAL: No  focal  weakness    MEDICATIONS  (STANDING):  aspirin enteric coated 81 milliGRAM(s) Oral daily  cephalexin 500 milliGRAM(s) Oral four times a day  dextrose 5%. 1000 milliLiter(s) (50 mL/Hr) IV Continuous <Continuous>  dextrose 50% Injectable 12.5 Gram(s) IV Push once  dextrose 50% Injectable 25 Gram(s) IV Push once  dextrose 50% Injectable 25 Gram(s) IV Push once  enoxaparin Injectable 40 milliGRAM(s) SubCutaneous daily  furosemide   Injectable 40 milliGRAM(s) IV Push two times a day  gabapentin 300 milliGRAM(s) Oral daily  insulin lispro (HumaLOG) corrective regimen sliding scale   SubCutaneous three times a day before meals  losartan 100 milliGRAM(s) Oral daily  metoprolol succinate ER 25 milliGRAM(s) Oral daily  zolpidem 5 milliGRAM(s) Oral at bedtime    MEDICATIONS  (PRN):  dextrose 40% Gel 15 Gram(s) Oral once PRN Blood Glucose LESS THAN 70 milliGRAM(s)/deciliter  glucagon  Injectable 1 milliGRAM(s) IntraMuscular once PRN Glucose LESS THAN 70 milligrams/deciliter  oxyCODONE    5 mG/acetaminophen 325 mG 1 Tablet(s) Oral every 12 hours PRN Moderate Pain (4 - 6)      Vital Signs Last 24 Hrs  T(C): 36.3 (09 Jun 2018 06:35), Max: 36.8 (08 Jun 2018 20:16)  T(F): 97.3 (09 Jun 2018 06:35), Max: 98.3 (08 Jun 2018 20:16)  HR: 64 (09 Jun 2018 06:35) (64 - 71)  BP: 158/82 (09 Jun 2018 06:35) (150/79 - 167/82)  BP(mean): --  RR: 18 (09 Jun 2018 06:35) (18 - 18)  SpO2: 98% (09 Jun 2018 06:35) (94% - 98%)  CAPILLARY BLOOD GLUCOSE        I&O's Summary    08 Jun 2018 07:01  -  09 Jun 2018 07:00  --------------------------------------------------------  IN: 837 mL / OUT: 2250 mL / NET: -1413 mL        PHYSICAL EXAM:  HEAD:  Atraumatic, Normocephalic  NECK: Supple, No   JVD  CHEST/LUNG:   no     rales,     no,    rhonchi  HEART: Regular rate and rhythm;         murmur  ABDOMEN: Soft, Nontender, ;   EXTREMITIES:   less     edema  NEUROLOGY:  alert    LABS:                        Hemoglobin A1C, Whole Blood: 6.6 % (06-07 @ 07:39)    Thyroid Stimulating Hormone, Serum: 2.39 uIU/mL (06-07 @ 07:54)          Consultant(s) Notes Reviewed:      Care Discussed with Consultants/Other Providers:
- Patient seen and examined.  - In summary, patient is a 68 year old man who presented with sob (2018 20:13)  - Today, patient is without complaints.         *****MEDICATIONS:    MEDICATIONS  (STANDING):  aspirin enteric coated 81 milliGRAM(s) Oral daily  ceFAZolin   IVPB 1000 milliGRAM(s) IV Intermittent every 8 hours  dextrose 5%. 1000 milliLiter(s) (50 mL/Hr) IV Continuous <Continuous>  dextrose 50% Injectable 12.5 Gram(s) IV Push once  dextrose 50% Injectable 25 Gram(s) IV Push once  dextrose 50% Injectable 25 Gram(s) IV Push once  enoxaparin Injectable 40 milliGRAM(s) SubCutaneous daily  furosemide   Injectable 40 milliGRAM(s) IV Push two times a day  gabapentin 300 milliGRAM(s) Oral daily  insulin lispro (HumaLOG) corrective regimen sliding scale   SubCutaneous three times a day before meals  losartan 50 milliGRAM(s) Oral daily  metoprolol succinate ER 25 milliGRAM(s) Oral daily  zolpidem 5 milliGRAM(s) Oral at bedtime    MEDICATIONS  (PRN):  dextrose 40% Gel 15 Gram(s) Oral once PRN Blood Glucose LESS THAN 70 milliGRAM(s)/deciliter  glucagon  Injectable 1 milliGRAM(s) IntraMuscular once PRN Glucose LESS THAN 70 milligrams/deciliter  oxyCODONE    5 mG/acetaminophen 325 mG 1 Tablet(s) Oral every 12 hours PRN Moderate Pain (4 - 6)           ***** REVIEW OF SYSTEM:  GEN: no fever, no chills, no pain  RESP: no SOB, no cough, no sputum  CVS: no chest pain, no palpitations, no edema  GI: no abdominal pain, no nausea, no vomiting, no constipation, no diarrhea  : no dysuria, no frequency  NEURO: no headache, no dizziness  PSYCH: no depression, not anxious  Derm : no itching, no rash         ***** VITAL SIGNS:  T(F): 98 (18 @ 06:21), Max: 98.7 (18 @ 22:01)  HR: 69 (18 @ 06:21) (69 - 98)  BP: 166/89 (18 @ 06:21) (161/75 - 189/98)  RR: 18 (18 @ 06:21) (16 - 18)  SpO2: 96% (18 @ 06:21) (95% - 100%)  Wt(kg): --  ,   I&O's Summary    2018 07:01  -  2018 07:00  --------------------------------------------------------  IN: 480 mL / OUT: 1925 mL / NET: -1445 mL             *****PHYSICAL EXAM:  GEN: A&O X 3 , NAD , comfortable  HEENT: NCAT, EOMI, MMM, no icterus  NECK: Supple, No JVD  CVS: S1S2 , regular , No M/R/G appreciated  PULM: CTA B/L,  no W/R/R appreciated  ABD.: soft. non tender, non distended,  bowel sounds present  Extrem: intact pulses , +edema   Derm: No rash or ecchymosis noted  PSYCH: normal mood, no depression, not anxious         *****LAB AND IMAGIN.2   6.82  )-----------( 252      ( 2018 07:39 )             35.4                   143  |  103  |  33<H>  ----------------------------<  142<H>  4.0   |  28  |  1.47<H>    Ca    8.7      2018 05:30    TPro  7.2  /  Alb  3.8  /  TBili  0.4  /  DBili  x   /  AST  13  /  ALT  5<L>  /  AlkPhos  69      PT/INR - ( 2018 15:29 )   PT: 12.5 sec;   INR: 1.14 ratio         PTT - ( 2018 15:29 )  PTT:33.5 sec       CARDIAC MARKERS ( 2018 15:29 )  x     / x     / 98 U/L / x     / x                    [All pertinent recent Imaging/Reports reviewed]         *****A S S E S S M E N T   A N D   P L A N :  68M with hx of polio with cellulitis vs venous stasis  hypertensive heart disease previously on no meds  started on beta blocker/ace  doppler no DVT  abx as per ID, leg improving  asa daily  await echo    __________________________  AFelipe Miranda D.O.
- Patient seen and examined.  - In summary, patient is a 68 year old man who presented with sob (2018 20:13)  - Today, patient is without complaints.         *****MEDICATIONS:    MEDICATIONS  (STANDING):  aspirin enteric coated 81 milliGRAM(s) Oral daily  cephalexin 500 milliGRAM(s) Oral four times a day  dextrose 5%. 1000 milliLiter(s) (50 mL/Hr) IV Continuous <Continuous>  dextrose 50% Injectable 12.5 Gram(s) IV Push once  dextrose 50% Injectable 25 Gram(s) IV Push once  dextrose 50% Injectable 25 Gram(s) IV Push once  enoxaparin Injectable 40 milliGRAM(s) SubCutaneous daily  furosemide    Tablet 60 milliGRAM(s) Oral daily  furosemide   Injectable 40 milliGRAM(s) IV Push two times a day  gabapentin 300 milliGRAM(s) Oral daily  insulin lispro (HumaLOG) corrective regimen sliding scale   SubCutaneous three times a day before meals  losartan 100 milliGRAM(s) Oral daily  metoprolol succinate ER 25 milliGRAM(s) Oral daily  zolpidem 5 milliGRAM(s) Oral at bedtime    MEDICATIONS  (PRN):  dextrose 40% Gel 15 Gram(s) Oral once PRN Blood Glucose LESS THAN 70 milliGRAM(s)/deciliter  glucagon  Injectable 1 milliGRAM(s) IntraMuscular once PRN Glucose LESS THAN 70 milligrams/deciliter  oxyCODONE    5 mG/acetaminophen 325 mG 1 Tablet(s) Oral every 12 hours PRN Moderate Pain (4 - 6)               ***** REVIEW OF SYSTEM:  GEN: no fever, no chills, no pain  RESP: no SOB, no cough, no sputum  CVS: no chest pain, no palpitations, no edema  GI: no abdominal pain, no nausea, no vomiting, no constipation, no diarrhea  : no dysuria, no frequency  NEURO: no headache, no dizziness  PSYCH: no depression, not anxious  Derm : no itching, no rash         ***** VITAL SIGNS:    T(F): 97.3 (18 @ 06:35), Max: 98.3 (18 @ 20:16)  HR: 64 (18 @ 06:35) (64 - 71)  BP: 158/82 (18 @ 06:35) (150/79 - 167/82)  RR: 18 (18 @ 06:35) (18 - 18)  SpO2: 98% (18 @ 06:35) (94% - 98%)  Wt(kg): --  ,   I&O's Summary    2018 07:01  -  2018 07:00  --------------------------------------------------------  IN: 837 mL / OUT: 2250 mL / NET: -1413 mL                   *****PHYSICAL EXAM:  GEN: A&O X 3 , NAD , comfortable  HEENT: NCAT, EOMI, MMM, no icterus  NECK: Supple, No JVD  CVS: S1S2 , regular , No M/R/G appreciated  PULM: CTA B/L,  no W/R/R appreciated  ABD.: soft. non tender, non distended,  bowel sounds present  Extrem: intact pulses , +edema   Derm: No rash or ecchymosis noted  PSYCH: normal mood, no depression, not anxious         *****LAB AND IMAGIN.5   7.65  )-----------( 255      ( 2018 08:25 )             37.0               [All pertinent recent Imaging/Reports reviewed]  < from: Transthoracic Echocardiogram (18 @ 08:48) >  Conclusions:  1. Normal left ventricular internal dimensions and wall  thicknesses.  2. Normal left ventricular systolic function. No segmental  wall motion abnormalities.  3. Normal diastolic function  4. Normal right ventricular size and function.  5. Estimated pulmonary artery systolic pressure equals 27  mm Hg, assuming right atrial pressure equals 8  mm Hg,  consistent with normal pulmonary pressures.    < end of copied text >         *****A S S E S S M E N T   A N D   P L A N :  68M with hx of polio with cellulitis vs venous stasis  hypertensive heart disease previously on no meds  started on beta blocker/RB  consider norvasc  doppler no DVT  abx as per ID, leg improving  asa daily  echo noted  dcp    __________________________  SABINO Miranda D.O.
- Patient seen and examined.  - In summary, patient is a 68 year old man who presented with sob (2018 20:13)  - Today, patient is without complaints.         *****MEDICATIONS:    MEDICATIONS  (STANDING):  aspirin enteric coated 81 milliGRAM(s) Oral daily  cephalexin 500 milliGRAM(s) Oral four times a day  dextrose 5%. 1000 milliLiter(s) (50 mL/Hr) IV Continuous <Continuous>  dextrose 50% Injectable 12.5 Gram(s) IV Push once  dextrose 50% Injectable 25 Gram(s) IV Push once  dextrose 50% Injectable 25 Gram(s) IV Push once  enoxaparin Injectable 40 milliGRAM(s) SubCutaneous daily  furosemide   Injectable 40 milliGRAM(s) IV Push two times a day  gabapentin 300 milliGRAM(s) Oral daily  insulin lispro (HumaLOG) corrective regimen sliding scale   SubCutaneous three times a day before meals  losartan 50 milliGRAM(s) Oral daily  metoprolol succinate ER 25 milliGRAM(s) Oral daily  zolpidem 5 milliGRAM(s) Oral at bedtime    MEDICATIONS  (PRN):  dextrose 40% Gel 15 Gram(s) Oral once PRN Blood Glucose LESS THAN 70 milliGRAM(s)/deciliter  glucagon  Injectable 1 milliGRAM(s) IntraMuscular once PRN Glucose LESS THAN 70 milligrams/deciliter  oxyCODONE    5 mG/acetaminophen 325 mG 1 Tablet(s) Oral every 12 hours PRN Moderate Pain (4 - 6)             ***** REVIEW OF SYSTEM:  GEN: no fever, no chills, no pain  RESP: no SOB, no cough, no sputum  CVS: no chest pain, no palpitations, no edema  GI: no abdominal pain, no nausea, no vomiting, no constipation, no diarrhea  : no dysuria, no frequency  NEURO: no headache, no dizziness  PSYCH: no depression, not anxious  Derm : no itching, no rash         ***** VITAL SIGNS:    T(F): 98.3 (18 @ 05:31), Max: 98.7 (18 @ 19:27)  HR: 67 (18 @ 05:31) (67 - 75)  BP: 155/67 (18 @ 05:31) (154/83 - 175/82)  RR: 17 (18 @ 05:31) (17 - 18)  SpO2: 95% (18 @ 05:31) (95% - 98%)  Wt(kg): --  ,   I&O's Summary    2018 07:01  -  2018 07:00  --------------------------------------------------------  IN: 1170 mL / OUT: 2100 mL / NET: -930 mL               *****PHYSICAL EXAM:  GEN: A&O X 3 , NAD , comfortable  HEENT: NCAT, EOMI, MMM, no icterus  NECK: Supple, No JVD  CVS: S1S2 , regular , No M/R/G appreciated  PULM: CTA B/L,  no W/R/R appreciated  ABD.: soft. non tender, non distended,  bowel sounds present  Extrem: intact pulses , +edema   Derm: No rash or ecchymosis noted  PSYCH: normal mood, no depression, not anxious         *****LAB AND IMAGIN.2   6.82  )-----------( 252      ( 2018 07:39 )             35.4               -    143  |  103  |  33<H>  ----------------------------<  142<H>  4.0   |  28  |  1.47<H>    Ca    8.7      2018 05:30    TPro  7.2  /  Alb  3.8  /  TBili  0.4  /  DBili  x   /  AST  13  /  ALT  5<L>  /  AlkPhos  69  06-06    PT/INR - ( 2018 15:29 )   PT: 12.5 sec;   INR: 1.14 ratio         PTT - ( 2018 15:29 )  PTT:33.5 sec       CARDIAC MARKERS ( 2018 15:29 )  x     / x     / 98 U/L / x     / x           [All pertinent recent Imaging/Reports reviewed]         *****A S S E S S M E N T   A N D   P L A N :  68M with hx of polio with cellulitis vs venous stasis  hypertensive heart disease previously on no meds  started on beta blocker/RB  will adjust prn  doppler no DVT  abx as per ID, leg improving  asa daily  await echo    __________________________  A. MELCHOR Miranda.
CC: f/u for LLE venosus stasis dermatitis    Patient reports that he has LLE pain when he gets up, noted swelling yesterday, but better today     REVIEW OF SYSTEMS:  All other review of systems negative (Comprehensive ROS)      T(F): 98.3 (06-08-18 @ 05:31), Max: 98.7 (06-07-18 @ 19:27)  HR: 67 (06-08-18 @ 05:31)  BP: 155/67 (06-08-18 @ 05:31)  RR: 17 (06-08-18 @ 05:31)  SpO2: 95% (06-08-18 @ 05:31)  Wt(kg): --    PHYSICAL EXAM:  General: alert, no acute distress  Eyes:  anicteric, no conjunctival injection, no discharge  Oropharynx: no lesions or injection 	  Neck: supple, without adenopathy  Lungs: clear to auscultation  Heart: regular rate and rhythm; no murmur, rubs or gallops  Abdomen: soft, nondistended, nontender, without mass or organomegaly  Skin: no lesions  Extremities: LLE he has multiple healed incision scars from prior multiple surgeries, faint erythema not warm to touch , appearance of venous stasis bilateral   Neurologic: alert, oriented, moves all extremities    LAB RESULTS:                        11.2   6.82  )-----------( 252      ( 07 Jun 2018 07:39 )             35.4     06-07    143  |  103  |  33<H>  ----------------------------<  142<H>  4.0   |  28  |  1.47<H>    Ca    8.7      07 Jun 2018 05:30    TPro  7.2  /  Alb  3.8  /  TBili  0.4  /  DBili  x   /  AST  13  /  ALT  5<L>  /  AlkPhos  69  06-06    LIVER FUNCTIONS - ( 06 Jun 2018 15:29 )  Alb: 3.8 g/dL / Pro: 7.2 g/dL / ALK PHOS: 69 U/L / ALT: 5 U/L / AST: 13 U/L / GGT: x             MICROBIOLOGY:  RECENT CULTURES:  06-06 @ 17:03 .Blood Blood-Peripheral     No growth to date.          RADIOLOGY REVIEWED:        Antimicrobials Day #    ceFAZolin   IVPB 1000 milliGRAM(s) IV Intermittent every 8 hours    Other Medications Reviewed
no cp/sob    REVIEW OF SYSTEMS:  CONSTITUTIONAL: No weakness,  no   fevers      RESPIRATORY:  No    shortness of breath  , no  wheezing  CARDIOVASCULAR: No chest pain,   no  palpitations, no  cough  GASTROINTESTINAL: No abdominal  pain, no  vomiting, no  diarrhea  NEUROLOGICAL: No  focal  weakness    MEDICATIONS  (STANDING):  aspirin enteric coated 81 milliGRAM(s) Oral daily  cephalexin 500 milliGRAM(s) Oral four times a day  dextrose 5%. 1000 milliLiter(s) (50 mL/Hr) IV Continuous <Continuous>  dextrose 50% Injectable 12.5 Gram(s) IV Push once  dextrose 50% Injectable 25 Gram(s) IV Push once  dextrose 50% Injectable 25 Gram(s) IV Push once  enoxaparin Injectable 40 milliGRAM(s) SubCutaneous daily  furosemide   Injectable 40 milliGRAM(s) IV Push two times a day  gabapentin 300 milliGRAM(s) Oral daily  insulin lispro (HumaLOG) corrective regimen sliding scale   SubCutaneous three times a day before meals  losartan 100 milliGRAM(s) Oral daily  metoprolol succinate ER 25 milliGRAM(s) Oral daily  zolpidem 5 milliGRAM(s) Oral at bedtime    MEDICATIONS  (PRN):  dextrose 40% Gel 15 Gram(s) Oral once PRN Blood Glucose LESS THAN 70 milliGRAM(s)/deciliter  glucagon  Injectable 1 milliGRAM(s) IntraMuscular once PRN Glucose LESS THAN 70 milligrams/deciliter  oxyCODONE    5 mG/acetaminophen 325 mG 1 Tablet(s) Oral every 12 hours PRN Moderate Pain (4 - 6)      Vital Signs Last 24 Hrs  T(C): 36.6 (08 Jun 2018 10:27), Max: 37.1 (07 Jun 2018 19:27)  T(F): 97.9 (08 Jun 2018 10:27), Max: 98.7 (07 Jun 2018 19:27)  HR: 71 (08 Jun 2018 10:27) (67 - 75)  BP: 150/79 (08 Jun 2018 10:27) (150/79 - 175/82)  BP(mean): --  RR: 18 (08 Jun 2018 10:27) (17 - 18)  SpO2: 94% (08 Jun 2018 10:27) (94% - 98%)  CAPILLARY BLOOD GLUCOSE        I&O's Summary    07 Jun 2018 07:01  -  08 Jun 2018 07:00  --------------------------------------------------------  IN: 1170 mL / OUT: 2100 mL / NET: -930 mL    08 Jun 2018 07:01  -  08 Jun 2018 12:33  --------------------------------------------------------  IN: 240 mL / OUT: 650 mL / NET: -410 mL        PHYSICAL EXAM:  HEAD:  Atraumatic, Normocephalic  NECK: Supple, No   JVD  CHEST/LUNG:   no     rales,     no,    rhonchi  HEART: Regular rate and rhythm;         murmur  ABDOMEN: Soft, Nontender, ;   EXTREMITIES:        edema, b/l,   polio  NEUROLOGY:  alert    LABS:                        11.2   6.82  )-----------( 252      ( 07 Jun 2018 07:39 )             35.4     06-07    143  |  103  |  33<H>  ----------------------------<  142<H>  4.0   |  28  |  1.47<H>    Ca    8.7      07 Jun 2018 05:30    TPro  7.2  /  Alb  3.8  /  TBili  0.4  /  DBili  x   /  AST  13  /  ALT  5<L>  /  AlkPhos  69  06-06    PT/INR - ( 06 Jun 2018 15:29 )   PT: 12.5 sec;   INR: 1.14 ratio         PTT - ( 06 Jun 2018 15:29 )  PTT:33.5 sec  CARDIAC MARKERS ( 06 Jun 2018 15:29 )  x     / x     / 98 U/L / x     / x                06-06 @ 15:29  4.1  25    Hemoglobin A1C, Whole Blood: 6.6 % (06-07 @ 07:39)    Thyroid Stimulating Hormone, Serum: 2.39 uIU/mL (06-07 @ 07:54)          Consultant(s) Notes Reviewed:      Care Discussed with Consultants/Other Providers:
pedal  edema , less  REVIEW OF SYSTEMS:  CONSTITUTIONAL: No weakness,  no   fevers      RESPIRATORY:  No    shortness of breath  , no  wheezing  CARDIOVASCULAR: No chest pain,   no  palpitations, no  cough  GASTROINTESTINAL: No abdominal  pain, no  vomiting, no  diarrhea  NEUROLOGICAL: No  focal  weakness    MEDICATIONS  (STANDING):  aspirin enteric coated 81 milliGRAM(s) Oral daily  ceFAZolin   IVPB 1000 milliGRAM(s) IV Intermittent every 8 hours  dextrose 5%. 1000 milliLiter(s) (50 mL/Hr) IV Continuous <Continuous>  dextrose 50% Injectable 12.5 Gram(s) IV Push once  dextrose 50% Injectable 25 Gram(s) IV Push once  dextrose 50% Injectable 25 Gram(s) IV Push once  enoxaparin Injectable 40 milliGRAM(s) SubCutaneous daily  furosemide   Injectable 40 milliGRAM(s) IV Push daily  gabapentin 300 milliGRAM(s) Oral daily  insulin lispro (HumaLOG) corrective regimen sliding scale   SubCutaneous three times a day before meals  losartan 50 milliGRAM(s) Oral daily  metoprolol succinate ER 25 milliGRAM(s) Oral daily  zolpidem 5 milliGRAM(s) Oral at bedtime    MEDICATIONS  (PRN):  dextrose 40% Gel 15 Gram(s) Oral once PRN Blood Glucose LESS THAN 70 milliGRAM(s)/deciliter  glucagon  Injectable 1 milliGRAM(s) IntraMuscular once PRN Glucose LESS THAN 70 milligrams/deciliter  oxyCODONE    5 mG/acetaminophen 325 mG 1 Tablet(s) Oral every 12 hours PRN Moderate Pain (4 - 6)      Vital Signs Last 24 Hrs  T(C): 36.7 (07 Jun 2018 06:21), Max: 37.1 (06 Jun 2018 22:01)  T(F): 98 (07 Jun 2018 06:21), Max: 98.7 (06 Jun 2018 22:01)  HR: 69 (07 Jun 2018 06:21) (69 - 98)  BP: 166/89 (07 Jun 2018 06:21) (161/75 - 189/98)  BP(mean): --  RR: 18 (07 Jun 2018 06:21) (16 - 18)  SpO2: 96% (07 Jun 2018 06:21) (95% - 100%)  CAPILLARY BLOOD GLUCOSE      POCT Blood Glucose.: 131 mg/dL (06 Jun 2018 21:58)    I&O's Summary    06 Jun 2018 07:01  -  07 Jun 2018 07:00  --------------------------------------------------------  IN: 480 mL / OUT: 1925 mL / NET: -1445 mL        PHYSICAL EXAM:  HEAD:  Atraumatic, Normocephalic  NECK: Supple, No   JVD  CHEST/LUNG:   no     rales,     no,    rhonchi  HEART: Regular rate and rhythm;         murmur  ABDOMEN: Soft, Nontender, ;   EXTREMITIES:  left  leg c/c redness/ edema  NEUROLOGY:  alert    LABS:                        11.2   6.82  )-----------( 252      ( 07 Jun 2018 07:39 )             35.4     06-07    143  |  103  |  33<H>  ----------------------------<  142<H>  4.0   |  28  |  1.47<H>    Ca    8.7      07 Jun 2018 05:30    TPro  7.2  /  Alb  3.8  /  TBili  0.4  /  DBili  x   /  AST  13  /  ALT  5<L>  /  AlkPhos  69  06-06    PT/INR - ( 06 Jun 2018 15:29 )   PT: 12.5 sec;   INR: 1.14 ratio         PTT - ( 06 Jun 2018 15:29 )  PTT:33.5 sec  CARDIAC MARKERS ( 06 Jun 2018 15:29 )  x     / x     / 98 U/L / x     / x                06-06 @ 15:29  4.1  25      Thyroid Stimulating Hormone, Serum: 2.39 uIU/mL (06-07 @ 07:54)          Consultant(s) Notes Reviewed:      Care Discussed with Consultants/Other Providers:

## 2018-06-09 NOTE — PROVIDER CONTACT NOTE (MEDICATION) - ASSESSMENT
Pt educated on bloodclots and risks of refusing lovenox, Pt educated that he is at increased risk of clot while hospitalized, still wishes to refuse
a&ox4, no chest pain, pain, sob, difficulty breathing noted,

## 2018-06-09 NOTE — DISCHARGE NOTE ADULT - PATIENT PORTAL LINK FT
You can access the [facility] Patient Portal, offered by [facility], by registering with the following website: [email or web address]

## 2018-06-09 NOTE — DISCHARGE NOTE ADULT - HOSPITAL COURSE
67 yo male  with h/o htn, pt stopped  taking bp meds,  h/o  dm,  polio, has  motorized  chair, morbid  obesity  admitted  with b/l leg cellulitis/, left  greater  than right/ edema  Treated with    keflex and IV  lasix.  Doppler  legs - no  dvt  - may  be  c/c  stasis. Seen  by vascular, has  pad, per  vascular, f/p a s  an op  Pt stable for dc home today  Instructed to follow up with PMD and Vascular surgeon  1  week.

## 2018-06-09 NOTE — DISCHARGE NOTE ADULT - CARE PROVIDER_API CALL
Sarah Merrill), Surgery  [address]  [address]  [place] [address]  Phone: [phone]  Fax: [phone]    Yevgeniy Mcdowell), Internal Medicine  [address] [place] [address]  Phone: [phone]  Fax: [phone]

## 2018-06-09 NOTE — PROGRESS NOTE ADULT - ASSESSMENT
pt with h/o htn, pt stopped  taking bp meds,  h/o  dm,  polio, has  motorized  chair, morbid  obesity   admitted  with b/l leg cellulitis/, left  greater  than right/ edema   on   keflex  iv lasix   doppler  legs, no  dvt   dvt  ppx  may  be  c/c  stasis, pt   frustrated  seen  by vascular, has  pad, per  vascular, f/p a s  an op   dc home today   pmd/ vascular,  1  week
68 year old male with hx of polio wheel chair bound, recent admission at outside hospital was dx with LLE cellulitis reports he was there for nine days and then followed by a four day of doxy and keflex. He now c/o LLE pain. He has no fever and his white cell count is normal, he is non toxic appearing. In my opinion he has received and adequate course of abx for cellulitis, the current erythema on his leg may also be a venous stasis dermatitis.   The etiology of pain is not clear, but as per medical literature review polio patient can experience pain, in ddx also includes Post polio pain, overuse pain, Biomechanical pain, neuropathic pain,  work up of pain to be determined by medical team and also vascular surgery is on the case evaluate for vascular insufficiency as cause of pain.  Perhaps Neurology input may be helpful will defer to medicine. Based in lab values, flow sheets vital and exam in my opinion there is no signs of infection.   plan   He is on Ancef empiric, will change to oral keflex for three more days
pt with h/o htn, pt stopped  taking bp meds,  h/o  dm,  polio, has  motorized  chair, morbid  obesity   admitted  with b/l leg cellulitis/, left  greater  than right/ edema   on  iv ancef  iv lasix   doppler  legs, no  dvt   echo, pending, h/o  sob/ cp, better now  dvt  ppx  may  be  c/c  stasis, pt   frustrated  increase  lasix  ID  called
pt with h/o htn, pt stopped  taking bp meds,  h/o  dm,  polio, has  motorized  chair, morbid  obesity   admitted  with b/l leg cellulitis/, left  greater  than right/ edema   on  iv ancef  iv lasix   doppler  legs, no  dvt   echo, pending, h/o  sob/ cp, better now  dvt  ppx  may  be  c/c  stasis, pt   frustrated  seen  by vascular, a waiting  tests  ID  called

## 2018-06-09 NOTE — DISCHARGE NOTE ADULT - MEDICATION SUMMARY - MEDICATIONS TO TAKE
I will START or STAY ON the medications listed below when I get home from the hospital:    Cialis 10 mg oral tablet  -- 1 tab(s) by mouth once a day  -- Indication: For Pulm Htn    aspirin 81 mg oral delayed release tablet  -- 1 tab(s) by mouth once a day  -- Indication: For Heart health    oxyCODONE-acetaminophen 5 mg-325 mg oral tablet  -- 1 tab(s) by mouth every 12 hours, As needed, Moderate Pain (4 - 6) MDD:2  -- Indication: For Moderate to severe pain    losartan 100 mg oral tablet  -- 1 tab(s) by mouth once a day  -- Indication: For Hypertension    gabapentin 300 mg oral capsule  -- 1 cap(s) by mouth once a day  -- Indication: For Pain    Kombiglyze XR 5 mg-1000 mg oral tablet, extended release  -- 1 tab(s) by mouth once a day  -- Indication: For Diabetes    glipiZIDE 10 mg oral tablet, extended release  -- 1 tab(s) by mouth once a day  -- Indication: For Diabetes    zolpidem 5 mg oral tablet  -- 1 tab(s) by mouth once a day (at bedtime), As Needed for sleep aid MDD:1  DO NOT DRIVE OR OPERATE MACHINERY WHILE TAKING THIS MEDICATION.  -- Indication: For Sleep aid    metoprolol succinate 25 mg oral tablet, extended release  -- 1 tab(s) by mouth once a day  -- Indication: For Hypertension    cephalexin 500 mg oral capsule  -- 1 cap(s) by mouth 4 times a day  -- Indication: For Cellulitis and abscess of leg    furosemide 20 mg oral tablet  -- 3 tab(s) by mouth once a day  -- Indication: For Water pill

## 2018-06-09 NOTE — DISCHARGE NOTE ADULT - MEDICATION SUMMARY - MEDICATIONS TO STOP TAKING
I will STOP taking the medications listed below when I get home from the hospital:    amLODIPine 5 mg oral tablet  -- 1 tab(s) by mouth once a day, As Needed

## 2018-06-09 NOTE — PROVIDER CONTACT NOTE (MEDICATION) - ACTION/TREATMENT ORDERED:
JENNIFER Rivera aware, RN cannot force Pt, Okay for Pt to refuse, Cont to monitor Pt and educate on DVT prophylaxis
continue plan of care, risk and benefits explained verbalized understanding.

## 2018-06-09 NOTE — DISCHARGE NOTE ADULT - ADDITIONAL INSTRUCTIONS
Continue all medications including antibiotics as prescribed.  Follow up with Vascular Surgery MD,  Dr. Merrill  and your Primary Care MD in 1 week. You will need to follow up with your internist for management of your   Diabetes and Hypertension.   Call to schedule appointments. Continue all medications including antibiotics as prescribed.  Keep ACR Wrap on your Lt leg (from toe to thigh) at all times.  Keep Left Leg elevated at all times.   Follow up with Vascular Surgery MD,  Dr. Merrill  and your Primary Care MD in 1 week. You will need to follow up with your internist for management of your   Diabetes and Hypertension.   Call to schedule appointments.

## 2018-06-09 NOTE — DISCHARGE NOTE ADULT - PLAN OF CARE
Cellulitis resolving. Continue antibiotics as prescribed.  Follow up with Vascular Surgery MD and your Primary Care MD in 1 week. Take your medication as prescribed.  Follow up with your medical doctor for routine blood pressure monitoring, and to establish long term blood pressure treatment goals.  Low salt diet  Activity as tolerated.  Notify your doctor if you have any of the following symptoms:   Dizziness, Lightheadedness, Blurry vision, Headache, Chest pain, Shortness of breath Your hemoglobin A1C (HgA1C)  was 6.6 this admission.  Make sure you get your HgA1c checked every three months.  If you take oral diabetes medications, check your blood glucose two times a day.  If you take insulin, check your blood glucose before meals and at bedtime.  It's important not to skip any meals.  Keep a log of your blood glucose results and always take it with you to your doctor appointments.  Keep a list of your current medications including injectables and over the counter medications and bring this medication list with you to all your doctor appointments.  If you have not seen your ophthalmologist this year call for appointment.  Check your feet daily for redness, sores, or openings. Do not self treat. If no improvement in two days call your primary care physician for an appointment.  Low blood sugar (hypoglycemia) is a blood sugar below 70mg/dl. Check your blood sugar if you feel signs/symptoms of hypoglycemia. If your blood sugar is below 70 take 15 grams of carbohydrates (ex 4 oz of apple juice, 3-4 glucose tablets, or 4-6 oz of regular soda) wait 15 minutes and repeat blood sugar to make sure it comes up above 70.  If your blood sugar is above 70 and you are due for a meal, have a meal.  If you are not due for a meal have a snack.  This snack helps keeps your blood sugar at a safe range.

## 2018-06-09 NOTE — DISCHARGE NOTE ADULT - CARE PLAN
Principal Discharge DX:	Cellulitis and abscess of leg  Goal:	Cellulitis resolving.  Assessment and plan of treatment:	Continue antibiotics as prescribed.  Follow up with Vascular Surgery MD and your Primary Care MD in 1 week.  Secondary Diagnosis:	Hypertension  Assessment and plan of treatment:	Take your medication as prescribed.  Follow up with your medical doctor for routine blood pressure monitoring, and to establish long term blood pressure treatment goals.  Low salt diet  Activity as tolerated.  Notify your doctor if you have any of the following symptoms:   Dizziness, Lightheadedness, Blurry vision, Headache, Chest pain, Shortness of breath  Secondary Diagnosis:	Diabetes  Assessment and plan of treatment:	Your hemoglobin A1C (HgA1C)  was 6.6 this admission.  Make sure you get your HgA1c checked every three months.  If you take oral diabetes medications, check your blood glucose two times a day.  If you take insulin, check your blood glucose before meals and at bedtime.  It's important not to skip any meals.  Keep a log of your blood glucose results and always take it with you to your doctor appointments.  Keep a list of your current medications including injectables and over the counter medications and bring this medication list with you to all your doctor appointments.  If you have not seen your ophthalmologist this year call for appointment.  Check your feet daily for redness, sores, or openings. Do not self treat. If no improvement in two days call your primary care physician for an appointment.  Low blood sugar (hypoglycemia) is a blood sugar below 70mg/dl. Check your blood sugar if you feel signs/symptoms of hypoglycemia. If your blood sugar is below 70 take 15 grams of carbohydrates (ex 4 oz of apple juice, 3-4 glucose tablets, or 4-6 oz of regular soda) wait 15 minutes and repeat blood sugar to make sure it comes up above 70.  If your blood sugar is above 70 and you are due for a meal, have a meal.  If you are not due for a meal have a snack.  This snack helps keeps your blood sugar at a safe range.

## 2018-06-11 LAB
CULTURE RESULTS: SIGNIFICANT CHANGE UP
CULTURE RESULTS: SIGNIFICANT CHANGE UP
SPECIMEN SOURCE: SIGNIFICANT CHANGE UP
SPECIMEN SOURCE: SIGNIFICANT CHANGE UP

## 2018-07-26 ENCOUNTER — INPATIENT (INPATIENT)
Facility: HOSPITAL | Age: 68
LOS: 3 days | Discharge: HOME | End: 2018-07-30
Attending: INTERNAL MEDICINE | Admitting: INTERNAL MEDICINE
Payer: MEDICARE

## 2018-07-26 VITALS
DIASTOLIC BLOOD PRESSURE: 72 MMHG | HEART RATE: 90 BPM | RESPIRATION RATE: 18 BRPM | OXYGEN SATURATION: 98 % | TEMPERATURE: 98 F | SYSTOLIC BLOOD PRESSURE: 157 MMHG

## 2018-07-26 DIAGNOSIS — R19.7 DIARRHEA, UNSPECIFIED: ICD-10-CM

## 2018-07-26 DIAGNOSIS — I10 ESSENTIAL (PRIMARY) HYPERTENSION: ICD-10-CM

## 2018-07-26 DIAGNOSIS — K92.2 GASTROINTESTINAL HEMORRHAGE, UNSPECIFIED: ICD-10-CM

## 2018-07-26 DIAGNOSIS — E11.9 TYPE 2 DIABETES MELLITUS WITHOUT COMPLICATIONS: ICD-10-CM

## 2018-07-26 DIAGNOSIS — N17.9 ACUTE KIDNEY FAILURE, UNSPECIFIED: ICD-10-CM

## 2018-07-26 DIAGNOSIS — D64.9 ANEMIA, UNSPECIFIED: ICD-10-CM

## 2018-07-26 PROBLEM — A80.9 ACUTE POLIOMYELITIS, UNSPECIFIED: Chronic | Status: ACTIVE | Noted: 2018-06-06

## 2018-07-26 LAB
ALBUMIN SERPL ELPH-MCNC: 3.3 G/DL — SIGNIFICANT CHANGE UP (ref 3.3–5)
ALP SERPL-CCNC: 78 U/L — SIGNIFICANT CHANGE UP (ref 40–120)
ALT FLD-CCNC: 9 U/L — SIGNIFICANT CHANGE UP (ref 4–41)
AST SERPL-CCNC: 15 U/L — SIGNIFICANT CHANGE UP (ref 4–40)
BASE EXCESS BLDV CALC-SCNC: 3.2 MMOL/L — SIGNIFICANT CHANGE UP
BASOPHILS # BLD AUTO: 0.02 K/UL — SIGNIFICANT CHANGE UP (ref 0–0.2)
BASOPHILS NFR BLD AUTO: 0.2 % — SIGNIFICANT CHANGE UP (ref 0–2)
BILIRUB SERPL-MCNC: 0.7 MG/DL — SIGNIFICANT CHANGE UP (ref 0.2–1.2)
BLOOD GAS VENOUS - CREATININE: 1.68 MG/DL — HIGH (ref 0.5–1.3)
BUN SERPL-MCNC: 30 MG/DL — HIGH (ref 7–23)
CALCIUM SERPL-MCNC: 8.2 MG/DL — LOW (ref 8.4–10.5)
CHLORIDE BLDV-SCNC: 99 MMOL/L — SIGNIFICANT CHANGE UP (ref 96–108)
CHLORIDE SERPL-SCNC: 95 MMOL/L — LOW (ref 98–107)
CO2 SERPL-SCNC: 25 MMOL/L — SIGNIFICANT CHANGE UP (ref 22–31)
CREAT SERPL-MCNC: 1.74 MG/DL — HIGH (ref 0.5–1.3)
EOSINOPHIL # BLD AUTO: 0.12 K/UL — SIGNIFICANT CHANGE UP (ref 0–0.5)
EOSINOPHIL NFR BLD AUTO: 1.3 % — SIGNIFICANT CHANGE UP (ref 0–6)
GAS PNL BLDV: 133 MMOL/L — LOW (ref 136–146)
GLUCOSE BLDV-MCNC: 184 — HIGH (ref 70–99)
GLUCOSE SERPL-MCNC: 174 MG/DL — HIGH (ref 70–99)
HCO3 BLDV-SCNC: 26 MMOL/L — SIGNIFICANT CHANGE UP (ref 20–27)
HCT VFR BLD CALC: 36.4 % — LOW (ref 39–50)
HCT VFR BLDV CALC: 38.5 % — LOW (ref 39–51)
HGB BLD-MCNC: 11.9 G/DL — LOW (ref 13–17)
HGB BLDV-MCNC: 12.5 G/DL — LOW (ref 13–17)
IMM GRANULOCYTES # BLD AUTO: 0.03 # — SIGNIFICANT CHANGE UP
IMM GRANULOCYTES NFR BLD AUTO: 0.3 % — SIGNIFICANT CHANGE UP (ref 0–1.5)
LACTATE BLDV-MCNC: 1.3 MMOL/L — SIGNIFICANT CHANGE UP (ref 0.5–2)
LYMPHOCYTES # BLD AUTO: 0.96 K/UL — LOW (ref 1–3.3)
LYMPHOCYTES # BLD AUTO: 10.4 % — LOW (ref 13–44)
MCHC RBC-ENTMCNC: 27.6 PG — SIGNIFICANT CHANGE UP (ref 27–34)
MCHC RBC-ENTMCNC: 32.7 % — SIGNIFICANT CHANGE UP (ref 32–36)
MCV RBC AUTO: 84.5 FL — SIGNIFICANT CHANGE UP (ref 80–100)
MONOCYTES # BLD AUTO: 1.24 K/UL — HIGH (ref 0–0.9)
MONOCYTES NFR BLD AUTO: 13.4 % — SIGNIFICANT CHANGE UP (ref 2–14)
NEUTROPHILS # BLD AUTO: 6.87 K/UL — SIGNIFICANT CHANGE UP (ref 1.8–7.4)
NEUTROPHILS NFR BLD AUTO: 74.4 % — SIGNIFICANT CHANGE UP (ref 43–77)
NRBC # FLD: 0 — SIGNIFICANT CHANGE UP
OB PNL STL: NEGATIVE — SIGNIFICANT CHANGE UP
PCO2 BLDV: 50 MMHG — SIGNIFICANT CHANGE UP (ref 41–51)
PH BLDV: 7.37 PH — SIGNIFICANT CHANGE UP (ref 7.32–7.43)
PLATELET # BLD AUTO: 221 K/UL — SIGNIFICANT CHANGE UP (ref 150–400)
PMV BLD: 10.9 FL — SIGNIFICANT CHANGE UP (ref 7–13)
PO2 BLDV: 31 MMHG — LOW (ref 35–40)
POTASSIUM BLDV-SCNC: 3.5 MMOL/L — SIGNIFICANT CHANGE UP (ref 3.4–4.5)
POTASSIUM SERPL-MCNC: 3.7 MMOL/L — SIGNIFICANT CHANGE UP (ref 3.5–5.3)
POTASSIUM SERPL-SCNC: 3.7 MMOL/L — SIGNIFICANT CHANGE UP (ref 3.5–5.3)
PROT SERPL-MCNC: 7.2 G/DL — SIGNIFICANT CHANGE UP (ref 6–8.3)
RBC # BLD: 4.31 M/UL — SIGNIFICANT CHANGE UP (ref 4.2–5.8)
RBC # FLD: 14.4 % — SIGNIFICANT CHANGE UP (ref 10.3–14.5)
SAO2 % BLDV: 52.8 % — LOW (ref 60–85)
SODIUM SERPL-SCNC: 134 MMOL/L — LOW (ref 135–145)
WBC # BLD: 9.24 K/UL — SIGNIFICANT CHANGE UP (ref 3.8–10.5)
WBC # FLD AUTO: 9.24 K/UL — SIGNIFICANT CHANGE UP (ref 3.8–10.5)

## 2018-07-26 PROCEDURE — 99223 1ST HOSP IP/OBS HIGH 75: CPT

## 2018-07-26 PROCEDURE — 74176 CT ABD & PELVIS W/O CONTRAST: CPT | Mod: 26

## 2018-07-26 RX ORDER — SODIUM CHLORIDE 9 MG/ML
1000 INJECTION INTRAMUSCULAR; INTRAVENOUS; SUBCUTANEOUS ONCE
Qty: 0 | Refills: 0 | Status: COMPLETED | OUTPATIENT
Start: 2018-07-26 | End: 2018-07-26

## 2018-07-26 RX ORDER — SODIUM CHLORIDE 9 MG/ML
1000 INJECTION, SOLUTION INTRAVENOUS
Qty: 0 | Refills: 0 | Status: DISCONTINUED | OUTPATIENT
Start: 2018-07-26 | End: 2018-07-30

## 2018-07-26 RX ORDER — METOCLOPRAMIDE HCL 10 MG
10 TABLET ORAL ONCE
Qty: 0 | Refills: 0 | Status: COMPLETED | OUTPATIENT
Start: 2018-07-26 | End: 2018-07-26

## 2018-07-26 RX ORDER — HYDRALAZINE HCL 50 MG
10 TABLET ORAL ONCE
Qty: 0 | Refills: 0 | Status: COMPLETED | OUTPATIENT
Start: 2018-07-26 | End: 2018-07-26

## 2018-07-26 RX ADMIN — SODIUM CHLORIDE 1000 MILLILITER(S): 9 INJECTION INTRAMUSCULAR; INTRAVENOUS; SUBCUTANEOUS at 17:03

## 2018-07-26 RX ADMIN — Medication 10 MILLIGRAM(S): at 19:21

## 2018-07-26 RX ADMIN — SODIUM CHLORIDE 100 MILLILITER(S): 9 INJECTION, SOLUTION INTRAVENOUS at 22:33

## 2018-07-26 RX ADMIN — Medication 10 MILLIGRAM(S): at 22:33

## 2018-07-26 NOTE — H&P ADULT - PROBLEM SELECTOR PLAN 4
BPs elevated. S/p IV hydralazine 10mg x1 in the ED.  - C/w metoprolol succinate 25mg once daily. Will hold losartan and lasix in setting of possible SONDRA and restart both once Cr stabilizes and if BP tolerates.  - Will do IV hydralazine PRN as needed if BP>170 Mild, with Hgb 11.9, and normocytic. H/H at baseline. No S/S of active bleed. FOBT negative. Possibly 2/2 anemia of chronic disease.  - Monitor H/H and transfuse for goal Hgb > 7  - Will hold ASA and pharmacologic DVT ppx for now given that pt reports dark stools. If H/H remains stable, will restart ASA and start HSQ for DVT ppx.  - Will hold off on PPI, as pt without any BMs/melena while in the ED and no report of abdominal pain

## 2018-07-26 NOTE — ED PROVIDER NOTE - OBJECTIVE STATEMENT
Pt is a 67yo M with a PMhx of T2DM, pulmonary HTN and polio as a child presenting with 4 day duration of diarrhea, weakness and fever. The pt was discharged from [facility] on June 7th for cellulitis of the leg and placed on cephalexin. He endorses continuing to take antibiotics recently, but unable to remember which antibiotics. He states that he has been having profuse watery diarrhea that has looked black in color and has had at least two episodes where he has been unable to make it to the bathroom. He also endorses diaphoresis, anorexia and a fever of 103F during this time. He denies any vomiting and any abdominal or chest pain. He also states that his SoB has not worsened during this time and has been improving over the past few months. Pt is a 67yo M with a PMhx of T2DM, pulmonary HTN and polio as a child presenting with 4 day duration of diarrhea, weakness and fever. The pt was discharged from [facility] on June 7th for cellulitis of the leg and placed on cephalexin. He endorses continuing to take antibiotics recently, but unable to remember which antibiotics. He states that he has been having profuse watery diarrhea that has looked black in color and has had at least two episodes where he has been unable to make it to the bathroom. He also endorses diaphoresis, anorexia and a fever of 103F during this time. He denies any vomiting and any abdominal or chest pain. He also states that his SoB has not worsened during this time and has been improving over the past few months.    Attending/Tim: 67 yo M as described above h/o childhood polio, PHTN, p/w several days of non-bloody diarrhea, generalize weakness, fatigue and fever. Pt denies abd pain, CP, N/V, HA, SOB or palp. Pt had been recently hotialized and treated for LE cellulitis.

## 2018-07-26 NOTE — ED PROVIDER NOTE - MEDICAL DECISION MAKING DETAILS
69yo M with PMHx of polio, pulmonary hypertension, and diabetes presenting with dark stools and watery diarrhea for the past 4 days. He will be resuscitated with a 1L bolus of NS. It is likely that he may have a C Diff. infection given his recent admissions to [place] with antibiotic use for his cellulitis. Will send labs and follow up.

## 2018-07-26 NOTE — H&P ADULT - NSHPSOCIALHISTORY_GEN_ALL_CORE
Tobacco: denies  EtOH: denies  Illicit drugs: denies  Lives with Tobacco: denies  EtOH: denies  Illicit drugs: denies  Lives with wife. Wheelchair bound.

## 2018-07-26 NOTE — ED PROVIDER NOTE - PHYSICAL EXAMINATION
Vascular: Radial pulses equal.  Extremities: Edema in both legs with left worse than right. Right leg shortened and ankle with minimal movement 2/2 polio. Skin is red moreso on the left. Slight pain on palpation. Does not feel warm to touch. Vascular: Radial pulses equal.  Extremities: Edema in both legs with left worse than right. Right leg shortened and ankle with minimal movement 2/2 polio. Skin is red moreso on the left. Slight pain on palpation. Does not feel warm to touch.    Attending/Tim: NAD, +dry mucosa; PERRL/EOMI, non-icterus, supple, no LIBERTY, no JVD, RRR, CTAB; Abd-soft, NT/ND, no HSM; trace LE edema, +chronic skin changes, warm/dry, A&Ox3, nonfocal

## 2018-07-26 NOTE — H&P ADULT - ASSESSMENT
67 yo man with h/o morbid obesity, HTN, DM2, childhood polio (now wheelchair bound), and recent admission (6/6-6/9/18) at [facility] for b/l LE cellulitis (s/p Ancef and Keflex) presents from home due to diarrhea. 67 yo man with h/o morbid obesity, HTN, DM2, medication nonadherence, childhood polio c/b poliomyelitis (now wheelchair bound), and recent admission (6/6-6/9/18) at [facility] for b/l LE cellulitis (s/p Ancef and Keflex) presents with diarrhea, admitted for r/o C diff colitis and possible SONDRA.

## 2018-07-26 NOTE — H&P ADULT - NSHPPHYSICALEXAM_GEN_ALL_CORE
Vital Signs Last 24 Hrs  T(C): 36.7 (26 Jul 2018 20:54), Max: 36.8 (26 Jul 2018 18:42)  T(F): 98.1 (26 Jul 2018 20:54), Max: 98.3 (26 Jul 2018 18:42)  HR: 90 (26 Jul 2018 20:54) (80 - 91)  BP: 174/74 (26 Jul 2018 20:54) (157/72 - 189/92)  BP(mean): --  RR: 18 (26 Jul 2018 20:54) (16 - 18)  SpO2: 99% (26 Jul 2018 20:54) (98% - 100%)    PHYSICAL EXAM:  General: No acute distress.  HEENT: Normocephalic, atraumatic.  PERRL.  EOMI.  No scleral icterus.  Moist MM.  No oropharyngeal exudates.    Neck: Supple.  Full range of motion.  No JVD.  No carotid bruits.  No thyromegaly.  Trachea midline.  No lymphadenopathy.   Heart: RRR.  Normal S1 and S2.  No murmurs, rubs, or gallops.   Lungs: CTAB. No wheezes, crackles, or rhonchi.    Abdomen: BS+, soft, NT/ND.  No organomegaly.  Skin: Warm and dry.  No rashes.  Extremities: No edema, clubbing, or cyanosis.  2+ peripheral pulses b/l.  Musculoskeletal: No deformities.  No spinal or paraspinal tenderness.  Neuro: A&Ox3.  CN II-XII intact.  5/5 strength in UE and LE b/l.  Tactile sensation intact in UE and LE b/l.  Cerebellar function intact as assessed by finger-to-nose test. Vital Signs Last 24 Hrs  T(C): 36.7 (26 Jul 2018 20:54), Max: 36.8 (26 Jul 2018 18:42)  T(F): 98.1 (26 Jul 2018 20:54), Max: 98.3 (26 Jul 2018 18:42)  HR: 90 (26 Jul 2018 20:54) (80 - 91)  BP: 174/74 (26 Jul 2018 20:54) (157/72 - 189/92)  BP(mean): --  RR: 18 (26 Jul 2018 20:54) (16 - 18)  SpO2: 99% (26 Jul 2018 20:54) (98% - 100%)    PHYSICAL EXAM:  General: No acute distress. Morbidly obese.   HEENT: PERRL.  EOMI.  No scleral icterus.  Moist MM.  No oropharyngeal exudates.    Neck: Supple.  Full range of motion.  No JVD.  No thyromegaly.  Trachea midline.  No lymphadenopathy.   Heart: RRR.  Normal S1 and S2.  No murmurs, rubs, or gallops.   Lungs: CTAB. No wheezes, crackles, or rhonchi.    Abdomen: BS+, soft, NT/ND.    Skin: Warm and dry.  No rashes.  Extremities: No edema, clubbing, or cyanosis.  2+ peripheral pulses b/l.  Musculoskeletal: No deformities.  No spinal or paraspinal tenderness.  Neuro: A&Ox3.  CN II-XII intact.  5/5 strength in UE and LE b/l.  Tactile sensation intact in UE and LE b/l.  Cerebellar function intact as assessed by finger-to-nose test. Vital Signs Last 24 Hrs  T(C): 36.7 (26 Jul 2018 20:54), Max: 36.8 (26 Jul 2018 18:42)  T(F): 98.1 (26 Jul 2018 20:54), Max: 98.3 (26 Jul 2018 18:42)  HR: 90 (26 Jul 2018 20:54) (80 - 91)  BP: 174/74 (26 Jul 2018 20:54) (157/72 - 189/92)  BP(mean): --  RR: 18 (26 Jul 2018 20:54) (16 - 18)  SpO2: 99% (26 Jul 2018 20:54) (98% - 100%)    PHYSICAL EXAM:  General: No acute distress. Morbidly obese.   HEENT: PERRL.  EOMI.  No scleral icterus.  Moist MM.  No oropharyngeal exudates.    Neck: Thick circumference.  Supple.  Full range of motion.  No JVD.  No lymphadenopathy.   Heart: RRR.  Normal S1 and S2.    Lungs: CTAB. No wheezes, crackles, or rhonchi.    Abdomen: BS+, soft, NT, mildly distended.  Skin: Warm and dry.  +Chronic venous stasis skin changes of LLE, no erythema, warmth, or tenderness.    Extremities: 2+ pitting edema of LLE (chronic), 1+ pitting edema of RLE, R ankle with old surgical scar from ankle fracture repair.  2+ radial pulses b/l.  1+ DP pulses b/l.  Neuro: A&Ox3.  Intact muscle strength of UEs b/l.  +Paraplegia.

## 2018-07-26 NOTE — H&P ADULT - PROBLEM SELECTOR PLAN 3
Mild, with Hgb 11.9, and normocytic. H/H at baseline. No S/S of active bleed. Possibly 2/2 anemia of chronic disease.  - Monitor H/H and transfuse for goal Hgb > 7 Mild, with Hgb 11.9, and normocytic. H/H at baseline. No S/S of active bleed. FOBT negative. Possibly 2/2 anemia of chronic disease.  - Monitor H/H and transfuse for goal Hgb > 7  - Will hold ASA and pharmacologic DVT ppx for now given that pt reports dark stools. If H/H remains stable, will restart ASA and start HSQ for DVT ppx. Mild, with Hgb 11.9, and normocytic. H/H at baseline. No S/S of active bleed. FOBT negative. Possibly 2/2 anemia of chronic disease.  - Monitor H/H and transfuse for goal Hgb > 7  - Will hold ASA and pharmacologic DVT ppx for now given that pt reports dark stools. If H/H remains stable, will restart ASA and start HSQ for DVT ppx.  - Will hold off on PPI, as pt without any BMs/melena while in the ED and no report of abdominal pain Cr 1.74 vs. 1.47 on 6/7/18. Unclear baseline. Can be SONDRA on CKD vs. SONDRA, likely 2/2 prerenal azotemia from dehydration 2/2 diarrhea.  - S/p 1L bolus of NS in the ED, now on maintenance IVF  - Monitor Cr and UOP. Avoid NSAIDs, contrast, other nephrotoxins. Renally dose meds.   - Will check urine lytes if Cr does not improve with IVF administration

## 2018-07-26 NOTE — ED PROVIDER NOTE - NEURO NEGATIVE STATEMENT, MLM
no loss of consciousness, no gait abnormality, no headache, no sensory deficits. Endorses feeling weaker than usual.

## 2018-07-26 NOTE — H&P ADULT - PROBLEM SELECTOR PLAN 7
- DVT ppx: Will hold pharmacologic DVT ppx for now given that pt reports dark stools. If H/H remains stable, will start HSQ for DVT ppx.  - Diet: NPO except for meds Pt with h/o medication nonadherence. Pt unaware of what medications he was recently taking but states that other than his diabetes medications, he has been off of all medications for at least 1 week.  - Will need to do a medication reconciliation, will request from Pharmacy

## 2018-07-26 NOTE — H&P ADULT - NSHPREVIEWOFSYSTEMS_GEN_ALL_CORE
REVIEW OF SYSTEMS:    CONSTITUTIONAL: No weakness, fevers or chills  EYES/ENT: No visual changes;  No vertigo or throat pain   NECK: No pain or stiffness  RESPIRATORY: No cough, wheezing, hemoptysis; No shortness of breath  CARDIOVASCULAR: No chest pain or palpitations  GASTROINTESTINAL: No abdominal or epigastric pain. No nausea, vomiting, or hematemesis; No diarrhea or constipation. No melena or hematochezia.  GENITOURINARY: No dysuria, frequency or hematuria  NEUROLOGICAL: No numbness or weakness  SKIN: No itching, burning, rashes, or lesions   All other review of systems is negative unless indicated above. REVIEW OF SYSTEMS:    CONSTITUTIONAL: +Fevers/chills.  EYES/ENT: No visual changes. No throat pain.  NECK: No pain or stiffness  RESPIRATORY: No cough, SOB, wheezing, hemoptysis  CARDIOVASCULAR: No chest pain or palpitations  GASTROINTESTINAL: +Diarrhea. No abdominal or epigastric pain. No nausea, vomiting, or hematemesis. No hematochezia.  GENITOURINARY: No dysuria, frequency, or hematuria  NEUROLOGICAL: B/l LE paralysis (from childhood polio)  SKIN: +Chronic venous stasis dermatitis of LLE. +Chronic LLE swelling. No itching, burning, or rashes.   All other review of systems is negative unless indicated above. REVIEW OF SYSTEMS:    CONSTITUTIONAL: +Fevers/chills.  EYES/ENT: No visual changes. No throat pain.  NECK: No pain or stiffness  RESPIRATORY: No cough, SOB, wheezing, hemoptysis  CARDIOVASCULAR: No chest pain or palpitations  GASTROINTESTINAL: +Diarrhea. No abdominal or epigastric pain. No nausea, vomiting, or hematemesis. No hematochezia.  GENITOURINARY: +Mild dysuria. No urinary frequency/urgency or hematuria.  NEUROLOGICAL: B/l LE paralysis (from childhood polio)  SKIN: +Chronic venous stasis dermatitis of LLE. +Chronic LLE swelling. No itching, burning, or rashes.   All other review of systems is negative unless indicated above.

## 2018-07-26 NOTE — ED ADULT TRIAGE NOTE - CHIEF COMPLAINT QUOTE
PMH of polio. Pt sent by Dr. Esparza, pt c/o diarrhea x 4 days and lightheadedness. Pt reports recent abx use.

## 2018-07-26 NOTE — H&P ADULT - PROBLEM SELECTOR PLAN 1
Infectious vs. inflammatory. According to pt, had fevers and dark stools at home. Infectious vs. inflammatory. According to pt, had fevers and dark stools at home. Also with recent antibiotic use and hospital admission.  - Check C diff PCR; will hold off on empiric treatment for C diff colitis given that pt has not had any diarrhea/BMs while in the ED  - Check stool cultures  - CT A/P noncontrast pending, will f/u results  - C/w IVF with LR at 100 cc/hr for 10 hrs, will reassess volume status following completion of this liter  - Consider GI consult in AM  - Will keep NPO except meds Infectious vs. inflammatory. According to pt, had fevers and dark stools at home. Also with recent antibiotic use and hospital admission.  - Check C diff PCR; will hold off on empiric treatment for C diff colitis given that pt has not had any diarrhea/BMs while in the ED  - Check stool cultures  - CT A/P noncontrast pending, will f/u results  - C/w IVF with LR at 100 cc/hr for 10 hrs, will reassess volume status following completion of this liter  - Consider GI consult in AM for possible colonoscopy +/- EGD  - Will keep NPO except meds Infectious vs. inflammatory. According to pt, had fevers and dark stools at home. Also with recent antibiotic use and hospital admission.  - Check C diff PCR; will hold off on empiric treatment for C diff colitis given that pt has not had any diarrhea/BMs while in the ED  - Check stool cultures  - F/u blood cultures  - CT A/P noncontrast pending, will f/u results  - C/w IVF with LR at 100 cc/hr for 10 hrs, will reassess volume status following completion of this liter  - Consider GI consult in AM for possible colonoscopy +/- EGD  - Will keep NPO except meds

## 2018-07-26 NOTE — H&P ADULT - HISTORY OF PRESENT ILLNESS
69 yo man with h/o morbid obesity, HTN, DM2, childhood polio (now wheelchair bound), and recent admission (6/6-6/9/18) at [facility] for b/l LE cellulitis (s/p Ancef and Keflex) presents from home due to diarrhea. 67 yo man with h/o morbid obesity, HTN, DM2, childhood polio (now wheelchair bound), and recent admission (6/6-6/9/18) at [facility] for b/l LE cellulitis (s/p Ancef and Keflex) presents from home due to watery diarrhea for the past 4 days.    In the ED, Tm 98.3 oral, HR 80-91, -189/72-92, RR 16-18, O2 Sat % RA. Labs with no leukocytosis (WBC 9.24), Hgb at baseline at 11.9, Cr mildly elevated from baseline at 1.74 (baseline ~1.5), lactate 1.3. Got 1L bolus of NS, IV hydralazine 10mg x1, and IV Reglan 10mg x1. 67 yo man with h/o morbid obesity, HTN, DM2, childhood polio (now wheelchair bound), and recent admission (6/6-6/9/18) at [facility] for b/l LE cellulitis (s/p Ancef and Keflex) presents from home due to watery diarrhea for the past 4 days.    In the ED, Tm 98.3 oral, HR 80-91, -189/72-92, RR 16-18, O2 Sat % RA. Labs with no leukocytosis (WBC 9.24), Hgb at baseline at 11.9, Cr elevated at 1.74, lactate 1.3. Got 1L bolus of NS, IV hydralazine 10mg x1, and IV Reglan 10mg x1. 67 yo man with h/o morbid obesity, HTN, DM2, medication nonadherence, childhood polio c/b poliomyelitis (now wheelchair bound), and recent admission (6/6-6/9/18) at [facility] for b/l LE cellulitis (s/p Ancef and Keflex) presents from home due to diarrhea since Sunday. Pt states that he has been having 1 episode of large-volume, watery diarrhea with dark loose stools since Sunday., with bowel urgency on Thursday. No blood or mucus with the stools. Last episode of the diarrhea was Thursday early afternoon prior to presenting to the ED. Reports having minimal PO intake over the past 4-5 days. No accompanying abdominal pain or N/V, but has also been having occasional subjective fevers/chills and diaphoresis since onset of the diarrhea. Denies any sick contacts, recent travel, or other exposures. Last antibiotic use was in June for his LE cellulitis. Pt reports that he saw his new PCP, Dr. Esparza, on Wednesday and was recommended to go to the ED.     Of note, pt states that besides his diabetes medications, he has not been taking any of his prescribed medications for at least the past 1 week.     In the ED, Tm 98.3 oral, HR 80-91, -189/72-92, RR 16-18, O2 Sat % RA. Labs with no leukocytosis (WBC 9.24), Hgb at baseline at 11.9, Cr elevated at 1.74, lactate 1.3. Got 1L bolus of NS, IV hydralazine 10mg x1, and IV Reglan 10mg x1. 69 yo man with h/o morbid obesity, HTN, DM2, medication nonadherence, childhood polio c/b poliomyelitis (now wheelchair bound), and recent admission (6/6-6/9/18) at [facility] for b/l LE cellulitis (s/p Ancef and Keflex) presents from home due to diarrhea since Sunday. Pt states that he has been having 1 episode of large-volume, watery diarrhea with dark loose stools since Sunday., with bowel urgency on Thursday. No blood or mucus with the stools. Last episode of the diarrhea was Thursday early afternoon prior to presenting to the ED. Reports having minimal PO intake over the past 4-5 days. No accompanying abdominal pain or N/V, but has also been having occasional subjective fevers/chills and diaphoresis since onset of the diarrhea. Also with mild dysuria. Denies any sick contacts, recent travel, or other exposures. Last antibiotic use was in June for his LE cellulitis. Pt reports that he saw his new PCP, Dr. Esparza, on Wednesday and was recommended to go to the ED. Denies any CP, SOB, runny nose, cough, sore throat, or palpitations.    Of note, pt states that besides his diabetes medications, he has not been taking any of his prescribed medications for at least the past 1 week.     In the ED, Tm 98.3 oral, HR 80-91, -189/72-92, RR 16-18, O2 Sat % RA. Labs with no leukocytosis (WBC 9.24), Hgb at baseline at 11.9, Cr elevated at 1.74, lactate 1.3. Got 1L bolus of NS, IV hydralazine 10mg x1, and IV Reglan 10mg x1.

## 2018-07-26 NOTE — H&P ADULT - PROBLEM SELECTOR PLAN 8
- DVT ppx: Will hold pharmacologic DVT ppx for now given that pt reports dark stools. If H/H remains stable, will start HSQ for DVT ppx.  - Diet: NPO except for meds

## 2018-07-26 NOTE — H&P ADULT - PROBLEM SELECTOR PLAN 2
Cr 1.74 vs. 1.47 on 6/7/18. Unclear baseline. Can be SONDRA on CKD vs. SONDRA, likely 2/2 prerenal azotemia from dehydration 2/2 diarrhea.  - S/p 1L bolus of NS in the ED, now on maintenance IVF  - Monitor Cr and UOP. Avoid NSAIDs, contrast, other nephrotoxins. Renally dose meds.   - Check UA  - Will check urine lytes if Cr does not improve with IVF administration UA with large leuk esterase, >50 WBC, WBC clumps, and moderate bacteria.   - Given that pt is having mild dysuria, will treat with IV ceftriaxone  - F/u urine culture

## 2018-07-26 NOTE — H&P ADULT - PROBLEM SELECTOR PLAN 6
Pt with h/o medication nonadherence. Pt unaware of what medications he was recently taking but states that other than his diabetes medications, he has been off of all medications for at least 1 week.  - Will need to do a medication reconciliation, will request from Pharmacy [facility]. HgbA1c 6.6 in 6/2018.   - Start ISS with humalog and POCT glucose q6hrs while pt NPO

## 2018-07-26 NOTE — H&P ADULT - PROBLEM SELECTOR PLAN 5
Well controlled. HgbA1c 6.6 in 6/2018.   - Start ISS with humalog and POCT glucose q6hrs while pt NPO BPs elevated. S/p IV hydralazine 10mg x1 in the ED.  - C/w metoprolol succinate 25mg once daily. Will hold losartan and lasix in setting of possible SONDRA and restart both once Cr stabilizes and if BP tolerates.  - Will do IV hydralazine PRN as needed if BP>170

## 2018-07-26 NOTE — ED PROVIDER NOTE - PROGRESS NOTE DETAILS
CT head read with no acute changes and just worsening hydrocephalus likely 2/2 to atrophy. Stool guaiac positive. Admit to medicine. Patient laying comfortably in bed. Guaiac negative. Doesn't have a need to stool right now. previous update was written in error. DD ED ATTG:  called by hospitalist - uncertain of workup.  D/w Dr Chisholm - plan to check CT abd eval for colitis given diarrhea and abd discomfort and elev ESR, plan for colonoscopy, pt c/o HA, fevers, chills, sweats - check bl cultures.  Still awaiting CDT from pt - however pt reports 1 episode per day, earlier today and doesn't have to go right now.  Pt eating at present.  Also c/o gen HA, malaise, and fatigue.  Abd nontender.  Legs no cellulitis of calfs.  D/w Dr Chisholm, Pt with SONDRA and volume depletion, To get CT noncon, blood culture, fluids, Dr chisholm will call GI.  Will d/w hospitalist.  Maintain admission at this point.

## 2018-07-27 DIAGNOSIS — Z79.899 OTHER LONG TERM (CURRENT) DRUG THERAPY: ICD-10-CM

## 2018-07-27 DIAGNOSIS — Z29.9 ENCOUNTER FOR PROPHYLACTIC MEASURES, UNSPECIFIED: ICD-10-CM

## 2018-07-27 DIAGNOSIS — N39.0 URINARY TRACT INFECTION, SITE NOT SPECIFIED: ICD-10-CM

## 2018-07-27 LAB
ALBUMIN SERPL ELPH-MCNC: 3.2 G/DL — LOW (ref 3.3–5)
ALP SERPL-CCNC: 67 U/L — SIGNIFICANT CHANGE UP (ref 40–120)
ALT FLD-CCNC: 8 U/L — SIGNIFICANT CHANGE UP (ref 4–41)
APPEARANCE UR: SIGNIFICANT CHANGE UP
APTT BLD: 29.2 SEC — SIGNIFICANT CHANGE UP (ref 27.5–37.4)
AST SERPL-CCNC: 9 U/L — SIGNIFICANT CHANGE UP (ref 4–40)
BACTERIA # UR AUTO: SIGNIFICANT CHANGE UP
BASOPHILS # BLD AUTO: 0.03 K/UL — SIGNIFICANT CHANGE UP (ref 0–0.2)
BASOPHILS NFR BLD AUTO: 0.3 % — SIGNIFICANT CHANGE UP (ref 0–2)
BILIRUB SERPL-MCNC: 0.4 MG/DL — SIGNIFICANT CHANGE UP (ref 0.2–1.2)
BILIRUB UR-MCNC: NEGATIVE — SIGNIFICANT CHANGE UP
BLOOD UR QL VISUAL: HIGH
BUN SERPL-MCNC: 27 MG/DL — HIGH (ref 7–23)
C DIFF TOX GENS STL QL NAA+PROBE: SIGNIFICANT CHANGE UP
CALCIUM SERPL-MCNC: 7.8 MG/DL — LOW (ref 8.4–10.5)
CHLORIDE SERPL-SCNC: 98 MMOL/L — SIGNIFICANT CHANGE UP (ref 98–107)
CO2 SERPL-SCNC: 23 MMOL/L — SIGNIFICANT CHANGE UP (ref 22–31)
COLOR SPEC: YELLOW — SIGNIFICANT CHANGE UP
CREAT SERPL-MCNC: 1.59 MG/DL — HIGH (ref 0.5–1.3)
EOSINOPHIL # BLD AUTO: 0.11 K/UL — SIGNIFICANT CHANGE UP (ref 0–0.5)
EOSINOPHIL NFR BLD AUTO: 1.1 % — SIGNIFICANT CHANGE UP (ref 0–6)
GLUCOSE SERPL-MCNC: 144 MG/DL — HIGH (ref 70–99)
GLUCOSE UR-MCNC: NEGATIVE — SIGNIFICANT CHANGE UP
HCT VFR BLD CALC: 34.6 % — LOW (ref 39–50)
HGB BLD-MCNC: 11.2 G/DL — LOW (ref 13–17)
IMM GRANULOCYTES # BLD AUTO: 0.05 # — SIGNIFICANT CHANGE UP
IMM GRANULOCYTES NFR BLD AUTO: 0.5 % — SIGNIFICANT CHANGE UP (ref 0–1.5)
INR BLD: 1.31 — HIGH (ref 0.88–1.17)
KETONES UR-MCNC: NEGATIVE — SIGNIFICANT CHANGE UP
LEUKOCYTE ESTERASE UR-ACNC: HIGH
LYMPHOCYTES # BLD AUTO: 0.98 K/UL — LOW (ref 1–3.3)
LYMPHOCYTES # BLD AUTO: 9.8 % — LOW (ref 13–44)
MAGNESIUM SERPL-MCNC: 2.1 MG/DL — SIGNIFICANT CHANGE UP (ref 1.6–2.6)
MCHC RBC-ENTMCNC: 27.3 PG — SIGNIFICANT CHANGE UP (ref 27–34)
MCHC RBC-ENTMCNC: 32.4 % — SIGNIFICANT CHANGE UP (ref 32–36)
MCV RBC AUTO: 84.2 FL — SIGNIFICANT CHANGE UP (ref 80–100)
MONOCYTES # BLD AUTO: 1.26 K/UL — HIGH (ref 0–0.9)
MONOCYTES NFR BLD AUTO: 12.6 % — SIGNIFICANT CHANGE UP (ref 2–14)
NEUTROPHILS # BLD AUTO: 7.56 K/UL — HIGH (ref 1.8–7.4)
NEUTROPHILS NFR BLD AUTO: 75.7 % — SIGNIFICANT CHANGE UP (ref 43–77)
NITRITE UR-MCNC: NEGATIVE — SIGNIFICANT CHANGE UP
NRBC # FLD: 0 — SIGNIFICANT CHANGE UP
PH UR: 6.5 — SIGNIFICANT CHANGE UP (ref 4.6–8)
PHOSPHATE SERPL-MCNC: 2.6 MG/DL — SIGNIFICANT CHANGE UP (ref 2.5–4.5)
PLATELET # BLD AUTO: 209 K/UL — SIGNIFICANT CHANGE UP (ref 150–400)
PMV BLD: 10.4 FL — SIGNIFICANT CHANGE UP (ref 7–13)
POTASSIUM SERPL-MCNC: 3.4 MMOL/L — LOW (ref 3.5–5.3)
POTASSIUM SERPL-SCNC: 3.4 MMOL/L — LOW (ref 3.5–5.3)
PROT SERPL-MCNC: 6.5 G/DL — SIGNIFICANT CHANGE UP (ref 6–8.3)
PROT UR-MCNC: 300 MG/DL — SIGNIFICANT CHANGE UP
PROTHROM AB SERPL-ACNC: 15.1 SEC — HIGH (ref 9.8–13.1)
RBC # BLD: 4.11 M/UL — LOW (ref 4.2–5.8)
RBC # FLD: 14.5 % — SIGNIFICANT CHANGE UP (ref 10.3–14.5)
RBC CASTS # UR COMP ASSIST: SIGNIFICANT CHANGE UP (ref 0–?)
SODIUM SERPL-SCNC: 135 MMOL/L — SIGNIFICANT CHANGE UP (ref 135–145)
SP GR SPEC: 1.02 — SIGNIFICANT CHANGE UP (ref 1–1.04)
SQUAMOUS # UR AUTO: SIGNIFICANT CHANGE UP
UROBILINOGEN FLD QL: NORMAL MG/DL — SIGNIFICANT CHANGE UP
WBC # BLD: 9.99 K/UL — SIGNIFICANT CHANGE UP (ref 3.8–10.5)
WBC # FLD AUTO: 9.99 K/UL — SIGNIFICANT CHANGE UP (ref 3.8–10.5)
WBC CLUMPS #/AREA URNS HPF: PRESENT — HIGH (ref 0–?)
WBC UR QL: >50 — HIGH (ref 0–?)

## 2018-07-27 RX ORDER — CEFTRIAXONE 500 MG/1
1 INJECTION, POWDER, FOR SOLUTION INTRAMUSCULAR; INTRAVENOUS EVERY 24 HOURS
Qty: 0 | Refills: 0 | Status: DISCONTINUED | OUTPATIENT
Start: 2018-07-28 | End: 2018-07-30

## 2018-07-27 RX ORDER — INSULIN LISPRO 100/ML
VIAL (ML) SUBCUTANEOUS
Qty: 0 | Refills: 0 | Status: DISCONTINUED | OUTPATIENT
Start: 2018-07-27 | End: 2018-07-30

## 2018-07-27 RX ORDER — HYDRALAZINE HCL 50 MG
5 TABLET ORAL ONCE
Qty: 0 | Refills: 0 | Status: COMPLETED | OUTPATIENT
Start: 2018-07-27 | End: 2018-07-27

## 2018-07-27 RX ORDER — DEXTROSE 50 % IN WATER 50 %
12.5 SYRINGE (ML) INTRAVENOUS ONCE
Qty: 0 | Refills: 0 | Status: DISCONTINUED | OUTPATIENT
Start: 2018-07-27 | End: 2018-07-30

## 2018-07-27 RX ORDER — CEFTRIAXONE 500 MG/1
INJECTION, POWDER, FOR SOLUTION INTRAMUSCULAR; INTRAVENOUS
Qty: 0 | Refills: 0 | Status: DISCONTINUED | OUTPATIENT
Start: 2018-07-27 | End: 2018-07-27

## 2018-07-27 RX ORDER — HEPARIN SODIUM 5000 [USP'U]/ML
5000 INJECTION INTRAVENOUS; SUBCUTANEOUS EVERY 8 HOURS
Qty: 0 | Refills: 0 | Status: DISCONTINUED | OUTPATIENT
Start: 2018-07-27 | End: 2018-07-30

## 2018-07-27 RX ORDER — ASPIRIN/CALCIUM CARB/MAGNESIUM 324 MG
81 TABLET ORAL DAILY
Qty: 0 | Refills: 0 | Status: DISCONTINUED | OUTPATIENT
Start: 2018-07-27 | End: 2018-07-30

## 2018-07-27 RX ORDER — GABAPENTIN 400 MG/1
1 CAPSULE ORAL
Qty: 0 | Refills: 0 | COMMUNITY

## 2018-07-27 RX ORDER — DEXTROSE 50 % IN WATER 50 %
25 SYRINGE (ML) INTRAVENOUS ONCE
Qty: 0 | Refills: 0 | Status: DISCONTINUED | OUTPATIENT
Start: 2018-07-27 | End: 2018-07-30

## 2018-07-27 RX ORDER — SODIUM CHLORIDE 9 MG/ML
1000 INJECTION, SOLUTION INTRAVENOUS
Qty: 0 | Refills: 0 | Status: DISCONTINUED | OUTPATIENT
Start: 2018-07-27 | End: 2018-07-30

## 2018-07-27 RX ORDER — METOPROLOL TARTRATE 50 MG
25 TABLET ORAL DAILY
Qty: 0 | Refills: 0 | Status: DISCONTINUED | OUTPATIENT
Start: 2018-07-27 | End: 2018-07-30

## 2018-07-27 RX ORDER — GLUCAGON INJECTION, SOLUTION 0.5 MG/.1ML
1 INJECTION, SOLUTION SUBCUTANEOUS ONCE
Qty: 0 | Refills: 0 | Status: DISCONTINUED | OUTPATIENT
Start: 2018-07-27 | End: 2018-07-30

## 2018-07-27 RX ORDER — CEFTRIAXONE 500 MG/1
1 INJECTION, POWDER, FOR SOLUTION INTRAMUSCULAR; INTRAVENOUS ONCE
Qty: 0 | Refills: 0 | Status: COMPLETED | OUTPATIENT
Start: 2018-07-27 | End: 2018-07-27

## 2018-07-27 RX ORDER — POTASSIUM CHLORIDE 20 MEQ
40 PACKET (EA) ORAL ONCE
Qty: 0 | Refills: 0 | Status: COMPLETED | OUTPATIENT
Start: 2018-07-27 | End: 2018-07-27

## 2018-07-27 RX ORDER — LANOLIN ALCOHOL/MO/W.PET/CERES
3 CREAM (GRAM) TOPICAL AT BEDTIME
Qty: 0 | Refills: 0 | Status: DISCONTINUED | OUTPATIENT
Start: 2018-07-27 | End: 2018-07-30

## 2018-07-27 RX ORDER — TADALAFIL 10 MG/1
1 TABLET, FILM COATED ORAL
Qty: 0 | Refills: 0 | COMMUNITY

## 2018-07-27 RX ORDER — ZOLPIDEM TARTRATE 10 MG/1
5 TABLET ORAL AT BEDTIME
Qty: 0 | Refills: 0 | Status: DISCONTINUED | OUTPATIENT
Start: 2018-07-27 | End: 2018-07-30

## 2018-07-27 RX ORDER — HYDRALAZINE HCL 50 MG
10 TABLET ORAL ONCE
Qty: 0 | Refills: 0 | Status: COMPLETED | OUTPATIENT
Start: 2018-07-27 | End: 2018-07-27

## 2018-07-27 RX ORDER — DEXTROSE 50 % IN WATER 50 %
15 SYRINGE (ML) INTRAVENOUS ONCE
Qty: 0 | Refills: 0 | Status: DISCONTINUED | OUTPATIENT
Start: 2018-07-27 | End: 2018-07-30

## 2018-07-27 RX ADMIN — Medication 5 MILLIGRAM(S): at 22:11

## 2018-07-27 RX ADMIN — HEPARIN SODIUM 5000 UNIT(S): 5000 INJECTION INTRAVENOUS; SUBCUTANEOUS at 22:12

## 2018-07-27 RX ADMIN — Medication 10 MILLIGRAM(S): at 10:29

## 2018-07-27 RX ADMIN — Medication 40 MILLIEQUIVALENT(S): at 10:29

## 2018-07-27 RX ADMIN — Medication 25 MILLIGRAM(S): at 06:03

## 2018-07-27 RX ADMIN — CEFTRIAXONE 100 GRAM(S): 500 INJECTION, POWDER, FOR SOLUTION INTRAMUSCULAR; INTRAVENOUS at 03:49

## 2018-07-27 RX ADMIN — Medication 81 MILLIGRAM(S): at 10:29

## 2018-07-27 RX ADMIN — ZOLPIDEM TARTRATE 5 MILLIGRAM(S): 10 TABLET ORAL at 23:03

## 2018-07-27 RX ADMIN — Medication 3 MILLIGRAM(S): at 03:55

## 2018-07-27 RX ADMIN — HEPARIN SODIUM 5000 UNIT(S): 5000 INJECTION INTRAVENOUS; SUBCUTANEOUS at 14:55

## 2018-07-27 NOTE — CONSULT NOTE ADULT - ASSESSMENT
67 yo man with h/o morbid obesity, HTN, DM2, medication nonadherence, childhood polio c/b poliomyelitis (now wheelchair bound), and recent admission (6/6-6/9/18) at [facility] for b/l LE cellulitis (s/p Ancef and Keflex) presents with diarrhea, admitted for r/o C diff colitis and possible SONDRA.     Problem/Plan - 1:  ·  Problem: Diarrhea, unspecified type.  Plan: Infectious vs. inflammatory. According to pt, had fevers and dark stools at home (WAS TAKING PEPT-OBISMOL). Also with recent antibiotic use and hospital admission.  - Check C diff PCR; will hold off on empiric treatment for C diff colitis given that pt has not had any diarrhea/BMs while in the ED  - Check stool cultures  - F/u blood cultures  - Will schedule pt for colonoscopy + EGD Mon AM  - Pt. may have regular diet now.  - prep written for colonoscopy.      Problem/Plan - 2:  ·  Problem: UTI (urinary tract infection). Plan: UA with large leuk esterase, >50 WBC, WBC clumps, and moderate bacteria.   - Given that pt is having mild dysuria, will treat with IV ceftriaxone  - F/u urine culture.     Problem/Plan - 3:  ·  Problem: Anemia. Plan: Mild, with Hgb 11.9, and normocytic. H/H at baseline. No S/S of active bleed. FOBT negative. Possibly 2/2 anemia of chronic disease.  - Monitor H/H and transfuse for goal Hgb > 7  - Will send for anemia work-up.  - Will hold ASA and pharmacologic DVT ppx for now given that pt reports dark stools. If H/H remains stable, will restart ASA and start HSQ for DVT ppx.  - Will hold off on PPI, as pt without any BMs/melena while in the ED and no report of abdominal pain.

## 2018-07-27 NOTE — CONSULT NOTE ADULT - SUBJECTIVE AND OBJECTIVE BOX
Chief Complaint:  Patient is a 68y old  Male who presents with a chief complaint of Diarrhea (26 Jul 2018 22:41)      HPI:  67 yo man with h/o morbid obesity, HTN, DM2, medication nonadherence, childhood polio c/b poliomyelitis (now wheelchair bound), and recent admission (6/6-6/9/18) at [facility] for b/l LE cellulitis (s/p Ancef and Keflex) presents from home due to diarrhea since Sunday. Pt states that he has been having 1 episode of large-volume, watery diarrhea with dark loose stools since Sunday., with bowel urgency on Thursday. No blood or mucus with the stools. Last episode of the diarrhea was Thursday early afternoon prior to presenting to the ED. Reports having minimal PO intake over the past 4-5 days. No accompanying abdominal pain or N/V, but has also been having occasional subjective fevers/chills and diaphoresis since onset of the diarrhea. Also with mild dysuria. Denies any sick contacts, recent travel, or other exposures. Last antibiotic use was in June for his LE cellulitis. Pt reports that he saw his new PCP, Dr. Esparza, on Wednesday and was recommended to go to the ED. Denies any CP, SOB, runny nose, cough, sore throat, or palpitations.  pt. denies any prior GI symptoms. His last colonoscopy wa 12 years ago and had polyps    Allergies:  No Known Allergies      Home Medications:    Hospital Medications:  aspirin enteric coated 81 milliGRAM(s) Oral daily  dextrose 40% Gel 15 Gram(s) Oral once PRN  dextrose 5%. 1000 milliLiter(s) IV Continuous <Continuous>  dextrose 50% Injectable 12.5 Gram(s) IV Push once  dextrose 50% Injectable 25 Gram(s) IV Push once  dextrose 50% Injectable 25 Gram(s) IV Push once  glucagon  Injectable 1 milliGRAM(s) IntraMuscular once PRN  heparin  Injectable 5000 Unit(s) SubCutaneous every 8 hours  insulin lispro (HumaLOG) corrective regimen sliding scale   SubCutaneous three times a day before meals  lactated ringers. 1000 milliLiter(s) IV Continuous <Continuous>  melatonin 3 milliGRAM(s) Oral at bedtime  metoprolol succinate ER 25 milliGRAM(s) Oral daily  zolpidem 5 milliGRAM(s) Oral at bedtime PRN      PMHX/PSHX:  Hypertension  Pulmonary hypertension  Diabetes  Polio  No significant past surgical history      Family history:  No pertinent family history in first degree relatives      Social History: Tobacco: [ ] yes  [ -] no  EtOH: [ ] yes  [ ] n-o  Illicit drugs: denies  Lives with wife    ROS:     General:  No wt loss, fevers, chills, night sweats, fatigue,   Eyes:  Good vision, no reported pain  ENT:  No sore throat, pain, runny nose, dysphagia  CV:  No pain, palpitations, hypo/hypertension  Resp:  No dyspnea, cough, tachypnea, wheezing  GI:  No pain, No nausea, No vomiting, No diarrhea, No constipation, No weight loss, No fever, No pruritis, No rectal bleeding, No tarry stools, No dysphagia,  :  No pain, bleeding, incontinence, nocturia  Muscle:  No pain, weakness  Neuro:  No weakness, tingling, memory problems  Psych:  No fatigue, insomnia, mood problems, depression  Endocrine:  No polyuria, polydipsia, cold/heat intolerance  Heme:  No petechiae, ecchymosis, easy bruisability  Skin:  No rash, tattoos, scars, edema      PHYSICAL EXAM:   Vital Signs:  Vital Signs Last 24 Hrs  T(C): 36.6 (27 Jul 2018 13:56), Max: 37 (27 Jul 2018 09:03)  T(F): 97.9 (27 Jul 2018 13:56), Max: 98.6 (27 Jul 2018 09:03)  HR: 79 (27 Jul 2018 13:56) (79 - 91)  BP: 173/94 (27 Jul 2018 13:56) (173/94 - 190/90)  BP(mean): --  RR: 18 (27 Jul 2018 13:56) (16 - 18)  SpO2: 99% (27 Jul 2018 13:56) (99% - 100%)  Daily     Daily     GENERAL:  Appears stated age, well-groomed, well-nourished, no distress  HEENT:  NC/AT,  conjunctivae clear and pink, no thyromegaly, nodules, adenopathy, no JVD, sclera -anicteric  CHEST:  Full & symmetric excursion, no increased effort, breath sounds clear  HEART:  Regular rhythm, S1, S2, no murmur/rub/S3/S4, no abdominal bruit, no edema  ABDOMEN:  obese, Soft, non-tender, non-distended, normoactive bowel sounds,  no masses ,no hepato-splenomegaly, no signs of chronic liver disease  EXTEREMITIES:  no cyanosis,clubbing or edema  SKIN:  No rash/erythema/ecchymoses/petechiae/wounds/abscess/warm/dry  NEURO:  Alert, oriented, no asterixis, no tremor, no encephalopathy  RECTAL: Heme     LABS:             Laboratory:   Blood Gas:	    26-Jul-2018 16:51, Blood Gas Venous Comprehensive	  pCO2, Venous: 50, [41 - 51 mmHg]	  pO2, Venous:    31, [35 - 40 mmHg]	  HCO3, Venous: 26, [20 - 27 mmol/L]	  Base Excess, Venous: 3.2, [ mmol/L], REFERENCE RANGE = -3 + 2 mmol/L	  Oxygen Saturation, Venous:    52.8, [60 - 85 %]	  Blood Gas Venous - Sodium:    133, [136 - 146 mmol/L]	  Blood Gas Venous - Potassium: 3.5, [3.4 - 4.5 mmol/L]	  Blood Gas Venous - Glucose:    184, [70 - 99]	  Blood Gas Venous - Hemoglobin:    12.5, [13.0 - 17.0 g/dL]	  Blood Gas Venous - Hematocrit:    38.5, [39.0 - 51.0 %]	  Blood Gas Venous - Lactate: 1.3, [0.5 - 2.0 mmol/L], Please note updated reference range.	  Blood Gas Venous - Creatinine:    1.68, [0.50 - 1.3 mg/dL]	  Blood Gas Venous - Chloride: 99, [96 - 108 mmol/L]	  pH, Venous: 7.37, [7.32 - 7.43 pH]	  General Chemistry:	    26-Jul-2018 16:43, Comprehensive Metabolic Panel	  Sodium, Serum:    134, [135 - 145 mmol/L]	  Potassium, Serum: 3.7, [3.5 - 5.3 mmol/L], SPECIMEN MILDLY HEMOLYZED	  Chloride, Serum:    95, [98 - 107 mmol/L]	  Carbon Dioxide, Serum: 25, [22 - 31 mmol/L]	  Blood Urea Nitrogen, Serum:    30, [7 - 23 mg/dL]	  Creatinine, Serum:    1.74, [0.5 - 1.3 mg/dL]	  Glucose, Serum:    174, [70 - 99 mg/dL]	  Calcium, Total Serum:    8.2, [8.4 - 10.5 mg/dL]	  Protein Total, Serum: 7.2, [6.0 - 8.3 g/dL], SPECIMEN MILDLY HEMOLYZED	  Albumin, Serum: 3.3, [3.3 - 5.0 g/dL]	  Bilirubin Total, Serum: 0.7, [0.2 - 1.2 mg/dL]	  Alkaline Phosphatase, Serum: 78, [40 - 120 u/L]	  Aspartate Aminotransferase (AST/SGOT): 15, [4 - 40 u/L]	  Alanine Aminotransferase (ALT/SGPT): 9, [4 - 41 u/L], SPECIMEN MILDLY HEMOLYZED	  eGFR if Non African American: 40, [>=60 mL/min], The units for eGFR are ml/min/1.73m2 (normalized body  surface area). The eGFR is calculated from a serum  creatinine using the CKD-EPI equation. Other variables  required for calculation are race, age and sex. Among  patients with chronic kidney disease (CKD), the eGFR is  useful in determining the stage of disease according to  KDOQI CKD classification. All eGFR results are reported  numerically with the following interpretation.    GFR  (ml/min/1.73 m2)          W/KIDNEY DAMAGE    W/O KIDNEY DMG  ==========================================================  >= 90.......................Stage 1..............Normal  60-89.......................Stage 2...........Decreased GFR  30-59.......................Stage 3..............Stage 3  15-29.......................Stage 4..............Stage 4  < 15........................Stage 5..............Stage 5    Each stage of CKD assumes that the associated GFR level  has been in effect for at least 3 months. Determination of  stages one and two (with eGFR > 59ml/min/m2) requires  estimation of kidney damage for at least 3 months as  defined by structural or functional abnormalities.    Limitations: All estimates of GFR will be less accurate  for patients at extremes of muscle mass (including but  not limited to frail elderly, critically ill, or cancer  patients), those with unusual diets, and those with  conditions associated with reduced secretion or  extrarenal elimination of creatinine. The eGFR equation  is not recommended for use in patients with unstable  creatinine levels.	  eGFR if : 46, [>=60 mL/min]	  Hematology:	    26-Jul-2018 16:43, Complete Blood Count + Automated Diff	  WBC Count: 9.24, [3.8 - 10.5 K/uL]	  RBC Count: 4.31, [4.20 - 5.80 M/uL]	  Hemoglobin:    11.9, [13.0 - 17.0 g/dL]	  Hematocrit:    36.4, [39.0 - 50.0 %]	  Mean Cell Volume: 84.5, [80.0 - 100.0 fL]	  Mean Cell Hemoglobin: 27.6, [27.0 - 34.0 pg]	  Mean Cell Hemoglobin Conc: 32.7, [32.0 - 36.0 %]	  Red Cell Distrib Width: 14.4, [10.3 - 14.5 %]	  Platelet Count - Automated: 221, [150 - 400 K/uL]	  MPV: 10.9, [7.0 - 13.0 fl]	  Auto Neutrophil #: 6.87, [1.8 - 7.4 K/uL]	  Auto Lymphocyte #:    0.96, [1.0 - 3.3 K/uL]	  Auto Monocyte #:    1.24, [0.0 - 0.9 K/uL]	  Auto Eosinophil #: 0.12, [0.0 - 0.5 K/uL]	  Auto Basophil #: 0.02, [0 - 0.2 K/uL]	  Auto Immature Granulocyte #: 0.03, [ #], (Includes meta, myelo and promyelocytes)	  Auto Neutrophil %: 74.4, [43.0 - 77.0 %]	  Auto Lymphocyte %:    10.4, [13.0 - 44.0 %]	  Auto Monocyte %: 13.4, [2.0 - 14.0 %]	  Auto Eosinophil %: 1.3, [0.0 - 6.0 %]	  Auto Basophil %: 0.2, [0.0 - 2.0 %]	  Auto Immature Granulocyte %: 0.3, [0 - 1.5 %], (Includes meta, myelo and promyelocytes)	  Nucleated RBC #: 0	    26-Jul-2018 17:35, Occult Blood, Feces	  Occult Blood: NEGATIVE, [NEGATIVE], ** Results**    Positive Control = Positive  Negative Control = Negative	  Urine:	    26-Jul-2018 23:55, Urinalysis	  Color: YELLOW, [YELLOW]	  Urine Appearance: TURBID, [CLEAR]	  Glucose: NEGATIVE, [NEGATIVE]	  Bilirubin: NEGATIVE, [ - < SMALL]	  Ketone - Urine: NEGATIVE, [ - < TRACE]	  Specific Gravity: 1.019, [1.005 - 1.040]	  Blood:    MODERATE, [NEGATIVE]	  pH - Urine: 6.5, [4.6 - 8.0]	  Protein, Urine: 300, [ - < 20 mg/dL]	  Urobilinogen: NORMAL, [ - < 2.0 mg/dL]	  Nitrite: NEGATIVE, [NEGATIVE]	  Leukocyte Esterase Concentration:    LARGE, [ - < TRACE]	  Red Blood Cell - Urine: 2-5, [0 - 2/HPF]	  White Blood Cell - Urine:    >50, [0 - 5/HPF]	  White Blood Cell Clump:    PRESENT, [0 - 2/LPF]	  Bacteria: MOD, [0/HPF]	  Squamous Epithelial: MOD, [FEW/HPF]	            Imaging:

## 2018-07-27 NOTE — CONSULT NOTE ADULT - SUBJECTIVE AND OBJECTIVE BOX
67 yo man with h/o morbid obesity, HTN, DM2, medication nonadherence, childhood polio c/b poliomyelitis (now wheelchair bound), and recent admission (-18) at [facility] for b/l LE cellulitis (s/p Ancef and Keflex) presents from home due to diarrhea since . Pt states that he has been having 1 episode of large-volume, watery diarrhea with dark loose stools since ., with bowel urgency on Thursday. No blood or mucus with the stools. Last episode of the diarrhea was Thursday early afternoon prior to presenting to the ED. Reports having minimal PO intake over the past 4-5 days.     No accompanying abdominal pain or N/V, but has also been having occasional subjective fevers/chills and diaphoresis since onset of the diarrhea. Also with mild dysuria. Denies any sick contacts, recent travel, or other exposures. Last antibiotic use was in  for his LE cellulitis.     Of note, pt states that besides his diabetes medications, he has not been taking any of his prescribed medications for at least the past 1 week.     PAST MEDICAL & SURGICAL HISTORY:  Hypertension  Diabetes  Polio  No significant past surgical history    MEDICATIONS  (STANDING):  aspirin enteric coated 81 milliGRAM(s) Oral daily  dextrose 5%. 1000 milliLiter(s) (50 mL/Hr) IV Continuous <Continuous>  dextrose 50% Injectable 12.5 Gram(s) IV Push once  dextrose 50% Injectable 25 Gram(s) IV Push once  dextrose 50% Injectable 25 Gram(s) IV Push once  heparin  Injectable 5000 Unit(s) SubCutaneous every 8 hours  insulin lispro (HumaLOG) corrective regimen sliding scale   SubCutaneous three times a day before meals  lactated ringers. 1000 milliLiter(s) (100 mL/Hr) IV Continuous <Continuous>  melatonin 3 milliGRAM(s) Oral at bedtime  metoprolol succinate ER 25 milliGRAM(s) Oral daily    FAMILY HISTORY:  No pertinent family history in first degree relatives    No Known Allergies    REVIEW OF SYSTEMS: Pertinent positives and negatives as stated in HPI, otherwise negative    Vital signs  T(C): 36.9, Max: 37 ( @ 09:03)  HR: 82  BP: 180/88  SpO2: 99%    Physical Exam      LABS:   @ 07:50  WBC 9.99  / Hct 34.6  / SCr 1.59      @ 16:43  WBC 9.24  / Hct 36.4  / SCr 1.74       135  |  98  |  27<H>  ----------------------------<  144<H>  3.4<L>   |  23  |  1.59<H>    Ca    7.8<L>      2018 07:50  Phos  2.6       Mg     2.1         TPro  6.5  /  Alb  3.2<L>  /  TBili  0.4  /  DBili  x   /  AST  9   /  ALT  8   /  AlkPhos  67      PT/INR - ( 2018 07:50 )   PT: 15.1 SEC;   INR: 1.31     PTT - ( 2018 07:50 )  PTT:29.2 SEC    Urinalysis Basic - ( 2018 23:55 )  Color: YELLOW / Appearance: TURBID / S.019 / pH: 6.5  Gluc: NEGATIVE / Ketone: NEGATIVE  / Bili: NEGATIVE / Urobili: NORMAL mg/dL   Blood: MODERATE / Protein: 300 mg/dL / Nitrite: NEGATIVE   Leuk Esterase: LARGE / RBC: 2-5 / WBC >50   Sq Epi: MOD / Non Sq Epi: x / Bacteria: MOD    Urine Cx: p  Blood Cx: p    RADIOLOGY:  IMPRESSION: Marked bilateral hydroureteronephrosis to the level of the   urinary bladder. Mild changes of chronic bladder outlet obstruction.  Indeterminate lesion arising from the upper pole of right kidney for   which further evaluation with ultrasound is advised. 69 yo man with h/o morbid obesity, HTN, DM2, medication nonadherence, childhood polio c/b poliomyelitis (now wheelchair bound), and recent admission (-18) at [facility] for b/l LE cellulitis (s/p Ancef and Keflex) presents from home due to diarrhea since . Pt states that he has been having 1 episode of large-volume, watery diarrhea with dark loose stools since ., with bowel urgency on Thursday. No blood or mucus with the stools. Last episode of the diarrhea was Thursday early afternoon prior to presenting to the ED. Reports having minimal PO intake over the past 4-5 days.     No accompanying abdominal pain or N/V, but has also been having occasional subjective fevers/chills and diaphoresis since onset of the diarrhea. Also with mild dysuria. Denies any sick contacts, recent travel, or other exposures. Last antibiotic use was in  for his LE cellulitis.     Of note, pt states that besides his diabetes medications, he has not been taking any of his prescribed medications for at least the past 1 week.     Patient states he has had multiple issues since an original Murillo placement and Murillo trauma several years ago.  Patient is aware that he does not void well, follows with urologist Dr. Marinelli at [facility].  Patient is s/p UroLift.  Patient adamantly refused Murillo catheter, states he is voiding at his baseline.  He is aware of the swelling in his kidneys and the damage to the kidneys.  Discussed the risks of continued damage to the kidneys with continued obstruction.  Patient aware and refuses Murillo catheter.  PVR 17.    PAST MEDICAL & SURGICAL HISTORY:  Hypertension  Diabetes  Polio  No significant past surgical history    MEDICATIONS  (STANDING):  aspirin enteric coated 81 milliGRAM(s) Oral daily  heparin  Injectable 5000 Unit(s) SubCutaneous every 8 hours  insulin lispro (HumaLOG) corrective regimen sliding scale   SubCutaneous three times a day before meals  lactated ringers. 1000 milliLiter(s) (100 mL/Hr) IV Continuous <Continuous>  melatonin 3 milliGRAM(s) Oral at bedtime  metoprolol succinate ER 25 milliGRAM(s) Oral daily    FAMILY HISTORY:  No pertinent family history in first degree relatives    No Known Allergies    REVIEW OF SYSTEMS: Pertinent positives and negatives as stated in HPI, otherwise negative    Vital signs  T(C): 36.9, Max: 37 ( @ 09:03)  HR: 82  BP: 180/88  SpO2: 99%    Physical Exam:  Gen: NAD  Abd: Soft, NT, ND  : Voiding, clear yellow urine in urinal    LABS:   @ 07:50  WBC 9.99  / Hct 34.6  / SCr 1.59      @ 16:43  WBC 9.24  / Hct 36.4  / SCr 1.74       135  |  98  |  27<H>  ----------------------------<  144<H>  3.4<L>   |  23  |  1.59<H>    Ca    7.8<L>      2018 07:50  Phos  2.6       Mg     2.1         TPro  6.5  /  Alb  3.2<L>  /  TBili  0.4  /  DBili  x   /  AST  9   /  ALT  8   /  AlkPhos  67      PT/INR - ( 2018 07:50 )   PT: 15.1 SEC;   INR: 1.31     PTT - ( 2018 07:50 )  PTT:29.2 SEC    Urinalysis Basic - ( 2018 23:55 )  Color: YELLOW / Appearance: TURBID / S.019 / pH: 6.5  Gluc: NEGATIVE / Ketone: NEGATIVE  / Bili: NEGATIVE / Urobili: NORMAL mg/dL   Blood: MODERATE / Protein: 300 mg/dL / Nitrite: NEGATIVE   Leuk Esterase: LARGE / RBC: 2-5 / WBC >50   Sq Epi: MOD / Non Sq Epi: x / Bacteria: MOD    Urine Cx: p  Blood Cx: p    RADIOLOGY:  IMPRESSION: Marked bilateral hydroureteronephrosis to the level of the   urinary bladder. Mild changes of chronic bladder outlet obstruction.  Indeterminate lesion arising from the upper pole of right kidney for   which further evaluation with ultrasound is advised.

## 2018-07-27 NOTE — CONSULT NOTE ADULT - ATTENDING COMMENTS
Patient seen and examined, agree with above. Will trend creat. Patient would like to f/u with his primary urologist Dr. Marinelli.

## 2018-07-27 NOTE — CHART NOTE - NSCHARTNOTEFT_GEN_A_CORE
ADS NIGHT COVERAGE    Paged urology [phone] for consult for bilateral hydroureteronephrosis. Recommend post void bladder scan. Urology to see patient. Will follow up recommendations.     Jaylan MORENO  [phone]

## 2018-07-27 NOTE — CONSULT NOTE ADULT - ASSESSMENT
68yoM w/ b/l hydroureteronephrosis to the level of a thickened urinary bladder    -Given CT appearance and indications of long-standing outlet obstruction, recommend Murillo catheter for decompression  -Follow up urine, blood cultures  -Strict Is & Os  -Trend Creatinine 68yoM w/ b/l hydroureteronephrosis to the level of a thickened urinary bladder.      -Pt follows with urologist Dr. Marinelli at [facility].      -Patient adamantly refused Murillo catheter, states he is voiding at his baseline.    -He is aware of the swelling in his kidneys and the damage to the kidneys.    -Discussed the risks of continued damage to the kidneys with continued obstruction.  Patient aware and refuses Murillo catheter.   -Follow up urine, blood cultures  -Strict Is & Os  -Trend Creatinine  -Follow up as outpatient with established urologist Dr. Marinelli  -No inpatient  intervention, please call if questions

## 2018-07-28 LAB
BUN SERPL-MCNC: 24 MG/DL — HIGH (ref 7–23)
CALCIUM SERPL-MCNC: 8.1 MG/DL — LOW (ref 8.4–10.5)
CHLORIDE SERPL-SCNC: 100 MMOL/L — SIGNIFICANT CHANGE UP (ref 98–107)
CO2 SERPL-SCNC: 25 MMOL/L — SIGNIFICANT CHANGE UP (ref 22–31)
CREAT SERPL-MCNC: 1.51 MG/DL — HIGH (ref 0.5–1.3)
ERYTHROCYTE [SEDIMENTATION RATE] IN BLOOD: 85 MM/HR — HIGH (ref 1–15)
FERRITIN SERPL-MCNC: 245.8 NG/ML — SIGNIFICANT CHANGE UP (ref 30–400)
FOLATE SERPL-MCNC: 8.1 NG/ML — SIGNIFICANT CHANGE UP (ref 4.7–20)
GLUCOSE BLDC GLUCOMTR-MCNC: 137 MG/DL — HIGH (ref 70–99)
GLUCOSE BLDC GLUCOMTR-MCNC: 162 MG/DL — HIGH (ref 70–99)
GLUCOSE BLDC GLUCOMTR-MCNC: 226 MG/DL — HIGH (ref 70–99)
GLUCOSE SERPL-MCNC: 139 MG/DL — HIGH (ref 70–99)
HCT VFR BLD CALC: 34.4 % — LOW (ref 39–50)
HGB BLD-MCNC: 11.3 G/DL — LOW (ref 13–17)
IRON SATN MFR SERPL: 26 UG/DL — LOW (ref 45–165)
MCHC RBC-ENTMCNC: 28.1 PG — SIGNIFICANT CHANGE UP (ref 27–34)
MCHC RBC-ENTMCNC: 32.8 % — SIGNIFICANT CHANGE UP (ref 32–36)
MCV RBC AUTO: 85.6 FL — SIGNIFICANT CHANGE UP (ref 80–100)
NRBC # FLD: 0 — SIGNIFICANT CHANGE UP
PLATELET # BLD AUTO: 250 K/UL — SIGNIFICANT CHANGE UP (ref 150–400)
PMV BLD: 11.5 FL — SIGNIFICANT CHANGE UP (ref 7–13)
POTASSIUM SERPL-MCNC: 3.6 MMOL/L — SIGNIFICANT CHANGE UP (ref 3.5–5.3)
POTASSIUM SERPL-SCNC: 3.6 MMOL/L — SIGNIFICANT CHANGE UP (ref 3.5–5.3)
RBC # BLD: 4.02 M/UL — LOW (ref 4.2–5.8)
RBC # FLD: 14.6 % — HIGH (ref 10.3–14.5)
RETICS #: 40 K/UL — SIGNIFICANT CHANGE UP (ref 25–125)
RETICS/RBC NFR: 1 % — SIGNIFICANT CHANGE UP (ref 0.5–2.5)
SODIUM SERPL-SCNC: 140 MMOL/L — SIGNIFICANT CHANGE UP (ref 135–145)
SPECIMEN SOURCE: SIGNIFICANT CHANGE UP
VIT B12 SERPL-MCNC: 277 PG/ML — SIGNIFICANT CHANGE UP (ref 200–900)
WBC # BLD: 9.81 K/UL — SIGNIFICANT CHANGE UP (ref 3.8–10.5)
WBC # FLD AUTO: 9.81 K/UL — SIGNIFICANT CHANGE UP (ref 3.8–10.5)

## 2018-07-28 RX ORDER — SOD SULF/SODIUM/NAHCO3/KCL/PEG
4000 SOLUTION, RECONSTITUTED, ORAL ORAL ONCE
Qty: 0 | Refills: 0 | Status: COMPLETED | OUTPATIENT
Start: 2018-07-29 | End: 2018-07-29

## 2018-07-28 RX ORDER — HYDRALAZINE HCL 50 MG
5 TABLET ORAL ONCE
Qty: 0 | Refills: 0 | Status: COMPLETED | OUTPATIENT
Start: 2018-07-28 | End: 2018-07-28

## 2018-07-28 RX ORDER — POTASSIUM CHLORIDE 20 MEQ
10 PACKET (EA) ORAL ONCE
Qty: 0 | Refills: 0 | Status: COMPLETED | OUTPATIENT
Start: 2018-07-28 | End: 2018-07-28

## 2018-07-28 RX ORDER — PREGABALIN 225 MG/1
1000 CAPSULE ORAL DAILY
Qty: 0 | Refills: 0 | Status: DISCONTINUED | OUTPATIENT
Start: 2018-07-28 | End: 2018-07-30

## 2018-07-28 RX ADMIN — HEPARIN SODIUM 5000 UNIT(S): 5000 INJECTION INTRAVENOUS; SUBCUTANEOUS at 22:42

## 2018-07-28 RX ADMIN — Medication 81 MILLIGRAM(S): at 11:13

## 2018-07-28 RX ADMIN — HEPARIN SODIUM 5000 UNIT(S): 5000 INJECTION INTRAVENOUS; SUBCUTANEOUS at 05:05

## 2018-07-28 RX ADMIN — Medication 3 MILLIGRAM(S): at 22:42

## 2018-07-28 RX ADMIN — CEFTRIAXONE 100 GRAM(S): 500 INJECTION, POWDER, FOR SOLUTION INTRAMUSCULAR; INTRAVENOUS at 05:05

## 2018-07-28 RX ADMIN — Medication 5 MILLIGRAM(S): at 11:12

## 2018-07-28 RX ADMIN — Medication 10 MILLIEQUIVALENT(S): at 11:12

## 2018-07-28 RX ADMIN — HEPARIN SODIUM 5000 UNIT(S): 5000 INJECTION INTRAVENOUS; SUBCUTANEOUS at 13:01

## 2018-07-28 RX ADMIN — Medication 25 MILLIGRAM(S): at 05:05

## 2018-07-28 RX ADMIN — Medication 2: at 12:57

## 2018-07-28 RX ADMIN — PREGABALIN 1000 MICROGRAM(S): 225 CAPSULE ORAL at 12:59

## 2018-07-29 LAB
-  AMIKACIN: SIGNIFICANT CHANGE UP
-  AMPICILLIN/SULBACTAM: SIGNIFICANT CHANGE UP
-  AMPICILLIN: SIGNIFICANT CHANGE UP
-  AZTREONAM: SIGNIFICANT CHANGE UP
-  CEFAZOLIN: SIGNIFICANT CHANGE UP
-  CEFEPIME: SIGNIFICANT CHANGE UP
-  CEFOXITIN: SIGNIFICANT CHANGE UP
-  CEFTAZIDIME: SIGNIFICANT CHANGE UP
-  CEFTRIAXONE: SIGNIFICANT CHANGE UP
-  CIPROFLOXACIN: SIGNIFICANT CHANGE UP
-  ERTAPENEM: SIGNIFICANT CHANGE UP
-  GENTAMICIN: SIGNIFICANT CHANGE UP
-  IMIPENEM: SIGNIFICANT CHANGE UP
-  LEVOFLOXACIN: SIGNIFICANT CHANGE UP
-  MEROPENEM: SIGNIFICANT CHANGE UP
-  NITROFURANTOIN: SIGNIFICANT CHANGE UP
-  PIPERACILLIN/TAZOBACTAM: SIGNIFICANT CHANGE UP
-  TIGECYCLINE: SIGNIFICANT CHANGE UP
-  TOBRAMYCIN: SIGNIFICANT CHANGE UP
-  TRIMETHOPRIM/SULFAMETHOXAZOLE: SIGNIFICANT CHANGE UP
BACTERIA UR CULT: SIGNIFICANT CHANGE UP
BUN SERPL-MCNC: 24 MG/DL — HIGH (ref 7–23)
CALCIUM SERPL-MCNC: 8.2 MG/DL — LOW (ref 8.4–10.5)
CHLORIDE SERPL-SCNC: 102 MMOL/L — SIGNIFICANT CHANGE UP (ref 98–107)
CO2 SERPL-SCNC: 22 MMOL/L — SIGNIFICANT CHANGE UP (ref 22–31)
CREAT SERPL-MCNC: 1.44 MG/DL — HIGH (ref 0.5–1.3)
GLUCOSE BLDC GLUCOMTR-MCNC: 116 MG/DL — HIGH (ref 70–99)
GLUCOSE BLDC GLUCOMTR-MCNC: 158 MG/DL — HIGH (ref 70–99)
GLUCOSE BLDC GLUCOMTR-MCNC: 217 MG/DL — HIGH (ref 70–99)
GLUCOSE SERPL-MCNC: 142 MG/DL — HIGH (ref 70–99)
HCT VFR BLD CALC: 31.4 % — LOW (ref 39–50)
HGB BLD-MCNC: 10.3 G/DL — LOW (ref 13–17)
MCHC RBC-ENTMCNC: 27.4 PG — SIGNIFICANT CHANGE UP (ref 27–34)
MCHC RBC-ENTMCNC: 32.8 % — SIGNIFICANT CHANGE UP (ref 32–36)
MCV RBC AUTO: 83.5 FL — SIGNIFICANT CHANGE UP (ref 80–100)
METHOD TYPE: SIGNIFICANT CHANGE UP
NRBC # FLD: 0 — SIGNIFICANT CHANGE UP
ORGANISM # SPEC MICROSCOPIC CNT: SIGNIFICANT CHANGE UP
ORGANISM # SPEC MICROSCOPIC CNT: SIGNIFICANT CHANGE UP
PLATELET # BLD AUTO: 253 K/UL — SIGNIFICANT CHANGE UP (ref 150–400)
PMV BLD: 10.9 FL — SIGNIFICANT CHANGE UP (ref 7–13)
POTASSIUM SERPL-MCNC: 3.6 MMOL/L — SIGNIFICANT CHANGE UP (ref 3.5–5.3)
POTASSIUM SERPL-SCNC: 3.6 MMOL/L — SIGNIFICANT CHANGE UP (ref 3.5–5.3)
RBC # BLD: 3.76 M/UL — LOW (ref 4.2–5.8)
RBC # FLD: 14.5 % — SIGNIFICANT CHANGE UP (ref 10.3–14.5)
SODIUM SERPL-SCNC: 140 MMOL/L — SIGNIFICANT CHANGE UP (ref 135–145)
WBC # BLD: 8.54 K/UL — SIGNIFICANT CHANGE UP (ref 3.8–10.5)
WBC # FLD AUTO: 8.54 K/UL — SIGNIFICANT CHANGE UP (ref 3.8–10.5)

## 2018-07-29 RX ADMIN — HEPARIN SODIUM 5000 UNIT(S): 5000 INJECTION INTRAVENOUS; SUBCUTANEOUS at 05:47

## 2018-07-29 RX ADMIN — ZOLPIDEM TARTRATE 5 MILLIGRAM(S): 10 TABLET ORAL at 02:01

## 2018-07-29 RX ADMIN — PREGABALIN 1000 MICROGRAM(S): 225 CAPSULE ORAL at 12:50

## 2018-07-29 RX ADMIN — Medication 81 MILLIGRAM(S): at 12:50

## 2018-07-29 RX ADMIN — HEPARIN SODIUM 5000 UNIT(S): 5000 INJECTION INTRAVENOUS; SUBCUTANEOUS at 23:12

## 2018-07-29 RX ADMIN — HEPARIN SODIUM 5000 UNIT(S): 5000 INJECTION INTRAVENOUS; SUBCUTANEOUS at 12:50

## 2018-07-29 RX ADMIN — Medication 25 MILLIGRAM(S): at 05:47

## 2018-07-29 RX ADMIN — Medication 4000 MILLILITER(S): at 09:52

## 2018-07-29 RX ADMIN — CEFTRIAXONE 100 GRAM(S): 500 INJECTION, POWDER, FOR SOLUTION INTRAMUSCULAR; INTRAVENOUS at 05:47

## 2018-07-29 NOTE — PROGRESS NOTE ADULT - ASSESSMENT
69 yo man with h/o morbid obesity, HTN, DM2, medication nonadherence, childhood polio c/b poliomyelitis (now wheelchair bound), and recent admission (6/6-6/9/18) at [facility] for b/l LE cellulitis (s/p Ancef and Keflex) presents with diarrhea, admitted for r/o C diff colitis and possible SONDRA.     Problem/Plan - 1:  ·  Problem: Diarrhea, unspecified type.  Plan: Infectious vs. inflammatory. According to pt, had fevers and dark stools at home (WAS TAKING PEPT-OBISMOL). Also with recent antibiotic use and hospital admission.  - C diff PCR is neg.; will hold off on empiric treatment for C diff colitis given that pt has not had any diarrhea/BMs while in the ED  - Check stool cultures  - Blood cultures are neg.  - Will schedule pt for colonoscopy + EGD Mon AM  - Pt. to continue clear liquid diet.  - prep written for colonoscopy.  - NPO after MN with IVF      Problem/Plan - 2:  ·  Problem: UTI (urinary tract infection). Plan: UA with large leuk esterase, >50 WBC, WBC clumps, and moderate bacteria.   - Continue Abx.     Problem/Plan - 3:  ·  Problem: Anemia with veru low Fe level. Plan: Mild, with Hgb 11.9, and normocytic. H/H at baseline. No S/S of active bleed. FOBT negative. Possibly 2/2 anemia of chronic disease.will ck TIBC  - Monitor H/H and transfuse for goal Hgb > 7  - Will send for anemia work-up.  - Will hold ASA and pharmacologic DVT ppx for now given that pt reports dark stools. If H/H remains stable, will restart ASA and start HSQ for DVT ppx.  - Will hold off on PPI, as pt without any BMs/melena while in the ED and no report of abdominal pain.

## 2018-07-30 ENCOUNTER — TRANSCRIPTION ENCOUNTER (OUTPATIENT)
Age: 68
End: 2018-07-30

## 2018-07-30 VITALS
RESPIRATION RATE: 18 BRPM | HEART RATE: 68 BPM | TEMPERATURE: 98 F | OXYGEN SATURATION: 100 % | HEIGHT: 67.8 IN | WEIGHT: 227.3 LBS | SYSTOLIC BLOOD PRESSURE: 169 MMHG | DIASTOLIC BLOOD PRESSURE: 76 MMHG

## 2018-07-30 LAB
24R-OH-CALCIDIOL SERPL-MCNC: 8.2 NG/ML — LOW (ref 30–80)
BUN SERPL-MCNC: 19 MG/DL — SIGNIFICANT CHANGE UP (ref 7–23)
CALCIUM SERPL-MCNC: 8.5 MG/DL — SIGNIFICANT CHANGE UP (ref 8.4–10.5)
CHLORIDE SERPL-SCNC: 99 MMOL/L — SIGNIFICANT CHANGE UP (ref 98–107)
CO2 SERPL-SCNC: 26 MMOL/L — SIGNIFICANT CHANGE UP (ref 22–31)
CREAT SERPL-MCNC: 1.32 MG/DL — HIGH (ref 0.5–1.3)
GLUCOSE BLDC GLUCOMTR-MCNC: 137 MG/DL — HIGH (ref 70–99)
GLUCOSE BLDC GLUCOMTR-MCNC: 141 MG/DL — HIGH (ref 70–99)
GLUCOSE SERPL-MCNC: 132 MG/DL — HIGH (ref 70–99)
HCT VFR BLD CALC: 32.8 % — LOW (ref 39–50)
HGB BLD-MCNC: 10.8 G/DL — LOW (ref 13–17)
IRON SATN MFR SERPL: 188 UG/DL — SIGNIFICANT CHANGE UP (ref 155–535)
IRON SATN MFR SERPL: 55 UG/DL — SIGNIFICANT CHANGE UP (ref 45–165)
MCHC RBC-ENTMCNC: 28.1 PG — SIGNIFICANT CHANGE UP (ref 27–34)
MCHC RBC-ENTMCNC: 32.9 % — SIGNIFICANT CHANGE UP (ref 32–36)
MCV RBC AUTO: 85.2 FL — SIGNIFICANT CHANGE UP (ref 80–100)
NRBC # FLD: 0 — SIGNIFICANT CHANGE UP
PLATELET # BLD AUTO: 296 K/UL — SIGNIFICANT CHANGE UP (ref 150–400)
PMV BLD: 10.7 FL — SIGNIFICANT CHANGE UP (ref 7–13)
POTASSIUM SERPL-MCNC: 3.5 MMOL/L — SIGNIFICANT CHANGE UP (ref 3.5–5.3)
POTASSIUM SERPL-SCNC: 3.5 MMOL/L — SIGNIFICANT CHANGE UP (ref 3.5–5.3)
RBC # BLD: 3.85 M/UL — LOW (ref 4.2–5.8)
RBC # FLD: 14.5 % — SIGNIFICANT CHANGE UP (ref 10.3–14.5)
SODIUM SERPL-SCNC: 140 MMOL/L — SIGNIFICANT CHANGE UP (ref 135–145)
UIBC SERPL-MCNC: 133.1 UG/DL — SIGNIFICANT CHANGE UP (ref 110–370)
WBC # BLD: 8.08 K/UL — SIGNIFICANT CHANGE UP (ref 3.8–10.5)
WBC # FLD AUTO: 8.08 K/UL — SIGNIFICANT CHANGE UP (ref 3.8–10.5)

## 2018-07-30 PROCEDURE — 76770 US EXAM ABDO BACK WALL COMP: CPT | Mod: 26

## 2018-07-30 RX ORDER — POTASSIUM CHLORIDE 20 MEQ
10 PACKET (EA) ORAL DAILY
Qty: 0 | Refills: 0 | Status: DISCONTINUED | OUTPATIENT
Start: 2018-07-30 | End: 2018-07-30

## 2018-07-30 RX ORDER — FUROSEMIDE 40 MG
60 TABLET ORAL DAILY
Qty: 0 | Refills: 0 | Status: DISCONTINUED | OUTPATIENT
Start: 2018-07-30 | End: 2018-07-30

## 2018-07-30 RX ORDER — HYDRALAZINE HCL 50 MG
10 TABLET ORAL ONCE
Qty: 0 | Refills: 0 | Status: COMPLETED | OUTPATIENT
Start: 2018-07-30 | End: 2018-07-30

## 2018-07-30 RX ORDER — FUROSEMIDE 40 MG
1 TABLET ORAL
Qty: 0 | Refills: 0 | COMMUNITY

## 2018-07-30 RX ORDER — GABAPENTIN 400 MG/1
1 CAPSULE ORAL
Qty: 0 | Refills: 0 | COMMUNITY

## 2018-07-30 RX ORDER — LOSARTAN POTASSIUM 100 MG/1
100 TABLET, FILM COATED ORAL DAILY
Qty: 0 | Refills: 0 | Status: DISCONTINUED | OUTPATIENT
Start: 2018-07-30 | End: 2018-07-30

## 2018-07-30 RX ORDER — OMEPRAZOLE 10 MG/1
1 CAPSULE, DELAYED RELEASE ORAL
Qty: 0 | Refills: 0 | COMMUNITY

## 2018-07-30 RX ORDER — ESOMEPRAZOLE MAGNESIUM 40 MG/1
1 CAPSULE, DELAYED RELEASE ORAL
Qty: 30 | Refills: 0
Start: 2018-07-30 | End: 2018-08-28

## 2018-07-30 RX ORDER — SODIUM CHLORIDE 9 MG/ML
1000 INJECTION, SOLUTION INTRAVENOUS
Qty: 0 | Refills: 0 | Status: DISCONTINUED | OUTPATIENT
Start: 2018-07-30 | End: 2018-07-30

## 2018-07-30 RX ORDER — FUROSEMIDE 40 MG
3 TABLET ORAL
Qty: 0 | Refills: 0 | DISCHARGE
Start: 2018-07-30

## 2018-07-30 RX ORDER — LOSARTAN POTASSIUM 100 MG/1
100 TABLET, FILM COATED ORAL DAILY
Qty: 0 | Refills: 0 | Status: DISCONTINUED | OUTPATIENT
Start: 2018-07-30 | End: 2018-07-31

## 2018-07-30 RX ORDER — LOSARTAN POTASSIUM 100 MG/1
1 TABLET, FILM COATED ORAL
Qty: 0 | Refills: 0 | COMMUNITY

## 2018-07-30 RX ORDER — GABAPENTIN 400 MG/1
300 CAPSULE ORAL DAILY
Qty: 0 | Refills: 0 | Status: DISCONTINUED | OUTPATIENT
Start: 2018-07-30 | End: 2018-07-30

## 2018-07-30 RX ORDER — GABAPENTIN 400 MG/1
1 CAPSULE ORAL
Qty: 0 | Refills: 0 | DISCHARGE
Start: 2018-07-30

## 2018-07-30 RX ORDER — ERGOCALCIFEROL 1.25 MG/1
50000 CAPSULE ORAL
Qty: 0 | Refills: 0 | Status: DISCONTINUED | OUTPATIENT
Start: 2018-07-30 | End: 2018-07-30

## 2018-07-30 RX ORDER — METOPROLOL TARTRATE 50 MG
1 TABLET ORAL
Qty: 0 | Refills: 0 | DISCHARGE
Start: 2018-07-30

## 2018-07-30 RX ORDER — LOSARTAN POTASSIUM 100 MG/1
1 TABLET, FILM COATED ORAL
Qty: 0 | Refills: 0 | DISCHARGE
Start: 2018-07-30

## 2018-07-30 RX ADMIN — SODIUM CHLORIDE 100 MILLILITER(S): 9 INJECTION, SOLUTION INTRAVENOUS at 13:47

## 2018-07-30 RX ADMIN — Medication 25 MILLIGRAM(S): at 05:20

## 2018-07-30 RX ADMIN — CEFTRIAXONE 100 GRAM(S): 500 INJECTION, POWDER, FOR SOLUTION INTRAMUSCULAR; INTRAVENOUS at 05:21

## 2018-07-30 RX ADMIN — ZOLPIDEM TARTRATE 5 MILLIGRAM(S): 10 TABLET ORAL at 00:30

## 2018-07-30 RX ADMIN — Medication 10 MILLIGRAM(S): at 10:41

## 2018-07-30 NOTE — PROGRESS NOTE ADULT - SUBJECTIVE AND OBJECTIVE BOX
Patient is a 68y old  Male who presents with a chief complaint of Diarrhea (26 Jul 2018 22:41)      INTERVAL HPI/OVERNIGHT EVENTS:     MEDICATIONS  (STANDING):  aspirin enteric coated 81 milliGRAM(s) Oral daily  cefTRIAXone   IVPB 1 Gram(s) IV Intermittent every 24 hours  cyanocobalamin 1000 MICROGram(s) Oral daily  dextrose 5%. 1000 milliLiter(s) (50 mL/Hr) IV Continuous <Continuous>  dextrose 50% Injectable 12.5 Gram(s) IV Push once  dextrose 50% Injectable 25 Gram(s) IV Push once  dextrose 50% Injectable 25 Gram(s) IV Push once  heparin  Injectable 5000 Unit(s) SubCutaneous every 8 hours  insulin lispro (HumaLOG) corrective regimen sliding scale   SubCutaneous three times a day before meals  lactated ringers. 1000 milliLiter(s) (100 mL/Hr) IV Continuous <Continuous>  melatonin 3 milliGRAM(s) Oral at bedtime  metoprolol succinate ER 25 milliGRAM(s) Oral daily    MEDICATIONS  (PRN):  dextrose 40% Gel 15 Gram(s) Oral once PRN Blood Glucose LESS THAN 70 milliGRAM(s)/deciliter  glucagon  Injectable 1 milliGRAM(s) IntraMuscular once PRN Glucose LESS THAN 70 milligrams/deciliter  zolpidem 5 milliGRAM(s) Oral at bedtime PRN Insomnia        Orders last 24 hours:  POCT  Blood Glucose (07-29-18 @ 11:57)  POCT  Blood Glucose (07-29-18 @ 17:00)  Iron with Total Binding Capacity: AM Sched. Collection: 30-Jul-2018 06:00 (07-29-18 @ 19:43)  Vitamin D, 25-Hydroxy: Routine (07-29-18 @ 19:43)  US Kidney and Bladder: 17:10 (07-30-18 @ 06:16)  lactated ringers.: Solution, 1000 milliLiter(s) infuse at 100 mL/Hr, Stop After 10 Hours  Provider's Contact #: [phone] (07-30-18 @ 07:37)  POCT  Blood Glucose (07-30-18 @ 07:48)      Allergies    No Known Allergies    Intolerances        REVIEW OF SYSTEMS:  CARDIOVASCULAR: No chest pain, palpitations, dizziness, or leg swelling; no shortness of breath     RESPIRATORY: No cough, wheezing, chills or hemoptysis; No shortness of breath    GASTROINTESTINAL: No abdominal or epigastric pain. No nausea, vomiting, or hematemesis; No diarrhea or constipation. No melena or hematochezia.    NEUROLOGICAL: No headaches, memory loss, loss of strength, numbness      PHYSICAL EXAM:  Vital Signs Last 24 Hrs  T(C): 36.9 (30 Jul 2018 08:16), Max: 36.9 (30 Jul 2018 08:16)  T(F): 98.4 (30 Jul 2018 08:16), Max: 98.4 (30 Jul 2018 08:16)  HR: 69 (30 Jul 2018 08:16) (64 - 82)  BP: 179/82 (30 Jul 2018 08:16) (136/60 - 182/76)  BP(mean): --  RR: 18 (30 Jul 2018 08:16) (18 - 18)  SpO2: 98% (30 Jul 2018 08:16) (95% - 100%)    GENERAL: NAD, well-groomed, well-developed  HEAD:  Atraumatic, Normocephalic  EYES: EOMI, PERRLA, conjunctiva and sclera clear  NECK: Supple, No JVD, Normal thyroid  NERVOUS SYSTEM:  Alert & Oriented X3, Good concentration;  and symmetric  CHEST/LUNG: Clear to auscultation bilaterally; No rales, rhonchi, wheezing, or rubs  HEART: S1S2 regular, without murmur, rub nor gallop  ABDOMEN: Soft, Nontender, Nondistended; Bowel sounds present  EXTREMITIES:  2+ Peripheral Pulses, No clubbing, cyanosis, or edema      LABS:                        10.8   8.08  )-----------( 296      ( 30 Jul 2018 06:46 )             32.8     30 Jul 2018 06:46    140    |  99     |  19     ----------------------------<  132    3.5     |  26     |  1.32     Ca    8.5        30 Jul 2018 06:46        CAPILLARY BLOOD GLUCOSE      POCT Blood Glucose.: 141 mg/dL (30 Jul 2018 07:47)  POCT Blood Glucose.: 116 mg/dL (29 Jul 2018 16:59)  POCT Blood Glucose.: 217 mg/dL (29 Jul 2018 11:54)      TELEMETRY:        assessment: HD 5.  Continue Ceftriaxone.  For colonoscopy today.    D/W Dr Timmons    Message left for pt's outpatient urologist, Dr. Marinelli   [phone]
Patient is a 68y old  Male who presents with a chief complaint of Diarrhea (26 Jul 2018 22:41)      INTERVAL HPI/OVERNIGHT EVENTS:     MEDICATIONS  (STANDING):  aspirin enteric coated 81 milliGRAM(s) Oral daily  cefTRIAXone   IVPB 1 Gram(s) IV Intermittent every 24 hours  cyanocobalamin 1000 MICROGram(s) Oral daily  dextrose 5%. 1000 milliLiter(s) (50 mL/Hr) IV Continuous <Continuous>  dextrose 50% Injectable 12.5 Gram(s) IV Push once  dextrose 50% Injectable 25 Gram(s) IV Push once  dextrose 50% Injectable 25 Gram(s) IV Push once  heparin  Injectable 5000 Unit(s) SubCutaneous every 8 hours  insulin lispro (HumaLOG) corrective regimen sliding scale   SubCutaneous three times a day before meals  lactated ringers. 1000 milliLiter(s) (100 mL/Hr) IV Continuous <Continuous>  melatonin 3 milliGRAM(s) Oral at bedtime  metoprolol succinate ER 25 milliGRAM(s) Oral daily  polyethylene glycol/electrolyte Solution. 4000 milliLiter(s) Oral once    MEDICATIONS  (PRN):  dextrose 40% Gel 15 Gram(s) Oral once PRN Blood Glucose LESS THAN 70 milliGRAM(s)/deciliter  glucagon  Injectable 1 milliGRAM(s) IntraMuscular once PRN Glucose LESS THAN 70 milligrams/deciliter  zolpidem 5 milliGRAM(s) Oral at bedtime PRN Insomnia        Allergies    No Known Allergies    Intolerances        REVIEW OF SYSTEMS:  CARDIOVASCULAR: No chest pain, palpitations, dizziness, or leg swelling; no shortness of breath     RESPIRATORY: No cough, wheezing, chills or hemoptysis; No shortness of breath    GASTROINTESTINAL: No abdominal or epigastric pain. No nausea, vomiting, or hematemesis; No diarrhea or constipation. No melena or hematochezia.    NEUROLOGICAL: No headaches, memory loss, loss of strength, numbness      PHYSICAL EXAM:  Vital Signs Last 24 Hrs  T(C): 36.5 (29 Jul 2018 08:06), Max: 36.9 (28 Jul 2018 20:00)  T(F): 97.7 (29 Jul 2018 08:06), Max: 98.5 (28 Jul 2018 20:00)  HR: 70 (29 Jul 2018 08:06) (70 - 81)  BP: 164/78 (29 Jul 2018 08:06) (152/74 - 181/87)  BP(mean): --  RR: 18 (29 Jul 2018 08:06) (18 - 20)  SpO2: 96% (29 Jul 2018 08:06) (96% - 100%)    GENERAL: NAD, well-groomed, well-developed  HEAD:  Atraumatic, Normocephalic  EYES: EOMI, PERRLA, conjunctiva and sclera clear  NECK: Supple, No JVD, Normal thyroid  NERVOUS SYSTEM:  Alert & Oriented X3, Good concentration;  and symmetric  CHEST/LUNG: Clear to auscultation bilaterally; No rales, rhonchi, wheezing, or rubs  HEART: S4S1S2 regular, without murmur, rub nor gallop  ABDOMEN: Soft, Nontender, Nondistended; Bowel sounds present  EXTREMITIES:  2+ Peripheral Pulses, No clubbing, cyanosis, or edema      LABS:                        10.3   8.54  )-----------( 253      ( 29 Jul 2018 06:28 )             31.4     29 Jul 2018 06:28    140    |  102    |  24     ----------------------------<  142    3.6     |  22     |  1.44     Ca    8.2        29 Jul 2018 06:28        CAPILLARY BLOOD GLUCOSE      POCT Blood Glucose.: 158 mg/dL (29 Jul 2018 07:46)  POCT Blood Glucose.: 189 mg/dL (28 Jul 2018 22:28)  POCT Blood Glucose.: 162 mg/dL (28 Jul 2018 17:01)  POCT Blood Glucose.: 226 mg/dL (28 Jul 2018 11:54)        assessment:  Pt stable   Will restart Losartan.  Refusing mcconnell.    For Colonscopy tomorrow.
Patient is a 68y old  Male who presents with a chief complaint of Diarrhea (26 Jul 2018 22:41)      INTERVAL HPI/OVERNIGHT EVENTS: none    MEDICATIONS  (STANDING):  aspirin enteric coated 81 milliGRAM(s) Oral daily  cefTRIAXone   IVPB 1 Gram(s) IV Intermittent every 24 hours  dextrose 5%. 1000 milliLiter(s) (50 mL/Hr) IV Continuous <Continuous>  dextrose 50% Injectable 12.5 Gram(s) IV Push once  dextrose 50% Injectable 25 Gram(s) IV Push once  dextrose 50% Injectable 25 Gram(s) IV Push once  heparin  Injectable 5000 Unit(s) SubCutaneous every 8 hours  insulin lispro (HumaLOG) corrective regimen sliding scale   SubCutaneous three times a day before meals  lactated ringers. 1000 milliLiter(s) (100 mL/Hr) IV Continuous <Continuous>  melatonin 3 milliGRAM(s) Oral at bedtime  metoprolol succinate ER 25 milliGRAM(s) Oral daily    MEDICATIONS  (PRN):  dextrose 40% Gel 15 Gram(s) Oral once PRN Blood Glucose LESS THAN 70 milliGRAM(s)/deciliter  glucagon  Injectable 1 milliGRAM(s) IntraMuscular once PRN Glucose LESS THAN 70 milligrams/deciliter  zolpidem 5 milliGRAM(s) Oral at bedtime PRN Insomnia        Allergies    No Known Allergies    Intolerances        REVIEW OF SYSTEMS:  CARDIOVASCULAR: No chest pain, palpitations, dizziness, or leg swelling; no shortness of breath     RESPIRATORY: No cough, wheezing, chills or hemoptysis; No shortness of breath    GASTROINTESTINAL: No abdominal or epigastric pain. No nausea, vomiting, or hematemesis; No diarrhea or constipation. No melena or hematochezia.    NEUROLOGICAL: No headaches, memory loss, loss of strength, numbness      PHYSICAL EXAM:  Vital Signs Last 24 Hrs  T(C): 36.7 (28 Jul 2018 05:02), Max: 37 (27 Jul 2018 09:03)  T(F): 98 (28 Jul 2018 05:02), Max: 98.6 (27 Jul 2018 09:03)  HR: 74 (28 Jul 2018 05:02) (74 - 83)  BP: 164/89 (28 Jul 2018 05:02) (153/85 - 192/92)  BP(mean): --  RR: 18 (28 Jul 2018 05:02) (17 - 18)  SpO2: 100% (28 Jul 2018 05:02) (99% - 100%)    GENERAL: NAD, well-groomed, well-developed  HEAD:  Atraumatic, Normocephalic  EYES: EOMI, PERRLA, conjunctiva and sclera clear  NECK: Supple, No JVD, Normal thyroid  NERVOUS SYSTEM:  Alert & Oriented X3, Good concentration;  and symmetric  CHEST/LUNG: Clear to auscultation bilaterally; No rales, rhonchi, wheezing, or rubs  HEART: S4S1S2 regular, without murmur, rub nor gallop  ABDOMEN: Soft, Nontender, Nondistended; Bowel sounds present  EXTREMITIES:  2+ Peripheral Pulses, No clubbing, cyanosis, or edema      LABS:                        11.3   9.81  )-----------( 250      ( 28 Jul 2018 05:44 )             34.4     28 Jul 2018 05:44    140    |  100    |  24     ----------------------------<  139    3.6     |  25     |  1.51     Ca    8.1        28 Jul 2018 05:44  Phos  2.6       27 Jul 2018 07:50  Mg     2.1       27 Jul 2018 07:50    TPro  6.5    /  Alb  3.2    /  TBili  0.4    /  DBili  x      /  AST  9      /  ALT  8      /  AlkPhos  67     27 Jul 2018 07:50    PT/INR - ( 27 Jul 2018 07:50 )   PT: 15.1 SEC;   INR: 1.31          PTT - ( 27 Jul 2018 07:50 )  PTT:29.2 SEC  CAPILLARY BLOOD GLUCOSE      POCT Blood Glucose.: 149 mg/dL (27 Jul 2018 22:19)  POCT Blood Glucose.: 130 mg/dL (27 Jul 2018 17:37)  POCT Blood Glucose.: 146 mg/dL (27 Jul 2018 13:11)  POCT Blood Glucose.: 148 mg/dL (27 Jul 2018 08:32)       < from: CT Abdomen and Pelvis No Cont (07.26.18 @ 23:36) >  IMPRESSION: Marked bilateral hydroureteronephrosis to the level of the   urinary bladder. Mild changes of chronic bladder outlet obstruction.  Indeterminate lesion arising from the upper pole of right kidney for   which further evaluation with ultrasound is advised.    < end of copied text >      Consultant(s) Notes Reviewed:  GI,       assessment:  pt stable.  abdomen 'queezy'  Awaiting Colonoscopy, renal u/s.  No urine culture- continue ceftriaxone.  Check f/u ESR         Care Discussed with Consultants/Other Providers:
Patient is a 68y old  Male who presents with a chief complaint of fevers and Diarrhea (26 Jul 2018 22:41)      INTERVAL HPI/OVERNIGHT EVENTS:    MEDICATIONS  (STANDING):  aspirin enteric coated 81 milliGRAM(s) Oral daily  cefTRIAXone   IVPB      dextrose 5%. 1000 milliLiter(s) (50 mL/Hr) IV Continuous <Continuous>  dextrose 50% Injectable 12.5 Gram(s) IV Push once  dextrose 50% Injectable 25 Gram(s) IV Push once  dextrose 50% Injectable 25 Gram(s) IV Push once  heparin  Injectable 5000 Unit(s) SubCutaneous every 8 hours  hydrALAZINE 10 milliGRAM(s) Oral once  insulin lispro (HumaLOG) corrective regimen sliding scale   SubCutaneous three times a day before meals  lactated ringers. 1000 milliLiter(s) (100 mL/Hr) IV Continuous <Continuous>  melatonin 3 milliGRAM(s) Oral at bedtime  metoprolol succinate ER 25 milliGRAM(s) Oral daily  potassium chloride    Tablet ER 40 milliEquivalent(s) Oral once    MEDICATIONS  (PRN):  dextrose 40% Gel 15 Gram(s) Oral once PRN Blood Glucose LESS THAN 70 milliGRAM(s)/deciliter  glucagon  Injectable 1 milliGRAM(s) IntraMuscular once PRN Glucose LESS THAN 70 milligrams/deciliter        Allergies    No Known Allergies    Intolerances        REVIEW OF SYSTEMS:  CARDIOVASCULAR: No chest pain, palpitations, dizziness, or leg swelling; no shortness of breath     RESPIRATORY: No cough, wheezing, chills or hemoptysis; No shortness of breath    GASTROINTESTINAL: No abdominal or epigastric pain. No nausea, vomiting, or hematemesis; No diarrhea or constipation. No melena or hematochezia.    NEUROLOGICAL: No headaches, memory loss, loss of strength, numbness      PHYSICAL EXAM:  Vital Signs Last 24 Hrs  T(C): 37 (27 Jul 2018 09:03), Max: 37 (27 Jul 2018 09:03)  T(F): 98.6 (27 Jul 2018 09:03), Max: 98.6 (27 Jul 2018 09:03)  HR: 83 (27 Jul 2018 09:03) (80 - 91)  BP: 188/88 (27 Jul 2018 09:03) (157/72 - 190/90)  BP(mean): --  RR: 17 (27 Jul 2018 09:03) (16 - 18)  SpO2: 99% (27 Jul 2018 09:03) (98% - 100%)    GENERAL: NAD, well-groomed, well-developed  HEAD:  Atraumatic, Normocephalic  EYES: EOMI, PERRLA, conjunctiva and sclera clear  NECK: Supple, No JVD, Normal thyroid  NERVOUS SYSTEM:  Alert & Oriented X3, Good concentration;  and symmetric  CHEST/LUNG: Clear to auscultation bilaterally; No rales, rhonchi, wheezing, or rubs  HEART: S1S2 regular, without murmur, rub nor gallop  ABDOMEN: Soft, Nontender, Nondistended; Bowel sounds present  EXTREMITIES:  2+ Peripheral Pulses, No clubbing, cyanosis, or edema      LABS:                        11.2   9.99  )-----------( 209      ( 27 Jul 2018 07:50 )             34.6     27 Jul 2018 07:50    135    |  98     |  27     ----------------------------<  144    3.4     |  23     |  1.59     Ca    7.8        27 Jul 2018 07:50  Phos  2.6       27 Jul 2018 07:50  Mg     2.1       27 Jul 2018 07:50    TPro  6.5    /  Alb  3.2    /  TBili  0.4    /  DBili  x      /  AST  9      /  ALT  8      /  AlkPhos  67     27 Jul 2018 07:50    PT/INR - ( 27 Jul 2018 07:50 )   PT: 15.1 SEC;   INR: 1.31          PTT - ( 27 Jul 2018 07:50 )  PTT:29.2 SEC  CAPILLARY BLOOD GLUCOSE      POCT Blood Glucose.: 148 mg/dL (27 Jul 2018 08:32)      TELEMETRY:    RADIOLOGY & ADDITIONAL TESTS:    Imaging Personally Reviewed:  [ ] YES     Consultant(s) Notes Reviewed:        assessment:  inflam. vs infectious diarrhea.  UTI   DM    Plan:   GI eval pending.  STILL awaiting C Dif PCR.  Check ESR (80 as outpatient).   CT abd/pelvis     Care Discussed with Consultants/Other Providers: ER/ GI
Chief Complaint:   pt. feels OK, taking the prep. for Colonoscopy    Interval Events:     Allergies:  No Known Allergies      Home Medications:    Hospital Medications:  aspirin enteric coated 81 milliGRAM(s) Oral daily  cefTRIAXone   IVPB 1 Gram(s) IV Intermittent every 24 hours  cyanocobalamin 1000 MICROGram(s) Oral daily  dextrose 40% Gel 15 Gram(s) Oral once PRN  dextrose 5%. 1000 milliLiter(s) IV Continuous <Continuous>  dextrose 50% Injectable 12.5 Gram(s) IV Push once  dextrose 50% Injectable 25 Gram(s) IV Push once  dextrose 50% Injectable 25 Gram(s) IV Push once  glucagon  Injectable 1 milliGRAM(s) IntraMuscular once PRN  heparin  Injectable 5000 Unit(s) SubCutaneous every 8 hours  insulin lispro (HumaLOG) corrective regimen sliding scale   SubCutaneous three times a day before meals  lactated ringers. 1000 milliLiter(s) IV Continuous <Continuous>  melatonin 3 milliGRAM(s) Oral at bedtime  metoprolol succinate ER 25 milliGRAM(s) Oral daily  zolpidem 5 milliGRAM(s) Oral at bedtime PRN    ROS  GI: [- ] Nausea, [- ] vomiting, [ -]abdominal pain    PHYSICAL EXAM:   Vital Signs:  Vital Signs Last 24 Hrs  T(C): 36.8 (29 Jul 2018 15:56), Max: 36.9 (28 Jul 2018 20:00)  T(F): 98.3 (29 Jul 2018 15:56), Max: 98.5 (28 Jul 2018 20:00)  HR: 64 (29 Jul 2018 15:56) (64 - 79)  BP: 136/60 (29 Jul 2018 15:56) (136/60 - 166/83)  BP(mean): --  RR: 18 (29 Jul 2018 15:56) (18 - 20)  SpO2: 100% (29 Jul 2018 15:56) (96% - 100%)  Daily     Daily     GENERAL:  Appears stated age, well-groomed, well-nourished, no distress  HEENT:  NC/AT,  conjunctivae clear and pink, no thyromegaly, nodules, adenopathy, no JVD, sclera -anicteric  CHEST:  Full & symmetric excursion, no increased effort, breath sounds clear  HEART:  Regular rhythm, S1, S2, no murmur/rub/S3/S4, no abdominal bruit, no edema  ABDOMEN:  Soft, non-tender, non-distended, normoactive bowel sounds,  no masses ,no hepato-splenomegaly, no signs of chronic liver disease  EXTEREMITIES:  no cyanosis,clubbing or edema  SKIN:  No rash/erythema/ecchymoses/petechiae/wounds/abscess/warm/dry  NEURO:  Alert, oriented, no asterixis, no tremor, no encephalopathy    LABS:                        10.3   8.54  )-----------( 253      ( 29 Jul 2018 06:28 )             31.4     07-29    140  |  102  |  24<H>  ----------------------------<  142<H>  3.6   |  22  |  1.44<H>    Ca    8.2<L>      29 Jul 2018 06:28                Imaging:

## 2018-07-30 NOTE — DISCHARGE NOTE ADULT - MEDICATION SUMMARY - MEDICATIONS TO CHANGE
I will SWITCH the dose or number of times a day I take the medications listed below when I get home from the hospital:    gabapentin 300 mg oral capsule  -- 1 cap(s) by mouth once a day I will SWITCH the dose or number of times a day I take the medications listed below when I get home from the hospital:    furosemide 40 mg oral tablet  -- 1 tab(s) by mouth 2 times a day    gabapentin 100 mg oral capsule  -- 4 cap(s) (400mg) by mouth once a day (at bedtime)  1 cap(s) (100mg) by mouth 2 times a day, As Needed for neuropathy pain    losartan 50 mg oral tablet  -- 1 tab(s) by mouth once a day

## 2018-07-30 NOTE — DISCHARGE NOTE ADULT - PLAN OF CARE
resolved You had colonoscopy done on 7/30/18. Please do not take aspirin for the next ten days. You completed course of IV antibiotics during your hospitalization. Please follow up with your urologist with in 1 week for further management Continue taking your home dose of glipizide. Monitor your fingersticks before each meal and at bedtime. Do not take Losartan and Lasix for your hypertension as your serum creatinine is high and until you see your primary doctor.  Continue to take you metoprolol as instructed Your serum creatinine on 7/30/18 was 1.32. Follow up with your Urologist within 1 week of discharge for further management. Your CT showed bilateral hydroureteronephrosis to level of bladder. please follow up with your urologist within 1 week of discharge for further management. Continue taking your home dose of diabetic medications. Monitor your fingersticks before each meal and at bedtime. Continue with your home dose of blood pressure medications

## 2018-07-30 NOTE — DISCHARGE NOTE ADULT - PATIENT PORTAL LINK FT
You can access the [facility] Patient Portal, offered by [facility], by registering with the following website: [email or web address]

## 2018-07-30 NOTE — DISCHARGE NOTE ADULT - CARE PLAN
Principal Discharge DX:	Diarrhea, unspecified type  Goal:	resolved  Assessment and plan of treatment:	You had colonoscopy done on 7/30/18. Please do not take aspirin for the next ten days.  Secondary Diagnosis:	UTI (urinary tract infection)  Goal:	resolved  Assessment and plan of treatment:	You completed course of IV antibiotics during your hospitalization. Please follow up with your urologist with in 1 week for further management  Secondary Diagnosis:	Diabetes  Assessment and plan of treatment:	Continue taking your home dose of glipizide. Monitor your fingersticks before each meal and at bedtime.  Secondary Diagnosis:	Hypertension  Assessment and plan of treatment:	Do not take Losartan and Lasix for your hypertension as your serum creatinine is high and until you see your primary doctor.  Continue to take you metoprolol as instructed  Secondary Diagnosis:	SONDRA (acute kidney injury)  Assessment and plan of treatment:	Your serum creatinine on 7/30/18 was 1.32. Follow up with your Urologist within 1 week of discharge for further management.  Secondary Diagnosis:	Hydroureteronephrosis  Assessment and plan of treatment:	Your CT showed bilateral hydroureteronephrosis to level of bladder. please follow up with your urologist within 1 week of discharge for further management. Principal Discharge DX:	Diarrhea, unspecified type  Goal:	resolved  Assessment and plan of treatment:	You had colonoscopy done on 7/30/18. Please do not take aspirin for the next ten days.  Secondary Diagnosis:	UTI (urinary tract infection)  Goal:	resolved  Assessment and plan of treatment:	You completed course of IV antibiotics during your hospitalization. Please follow up with your urologist with in 1 week for further management  Secondary Diagnosis:	Diabetes  Assessment and plan of treatment:	Continue taking your home dose of diabetic medications. Monitor your fingersticks before each meal and at bedtime.  Secondary Diagnosis:	Hypertension  Assessment and plan of treatment:	Do not take Losartan and Lasix for your hypertension as your serum creatinine is high and until you see your primary doctor.  Continue to take you metoprolol as instructed  Secondary Diagnosis:	SONDRA (acute kidney injury)  Assessment and plan of treatment:	Your serum creatinine on 7/30/18 was 1.32. Follow up with your Urologist within 1 week of discharge for further management.  Secondary Diagnosis:	Hydroureteronephrosis  Assessment and plan of treatment:	Your CT showed bilateral hydroureteronephrosis to level of bladder. please follow up with your urologist within 1 week of discharge for further management. Principal Discharge DX:	Diarrhea, unspecified type  Goal:	resolved  Assessment and plan of treatment:	You had colonoscopy done on 7/30/18. Please do not take aspirin for the next ten days.  Secondary Diagnosis:	UTI (urinary tract infection)  Goal:	resolved  Assessment and plan of treatment:	You completed course of IV antibiotics during your hospitalization. Please follow up with your urologist with in 1 week for further management  Secondary Diagnosis:	Diabetes  Assessment and plan of treatment:	Continue taking your home dose of diabetic medications. Monitor your fingersticks before each meal and at bedtime.  Secondary Diagnosis:	Hypertension  Assessment and plan of treatment:	Continue with your home dose of blood pressure medications  Secondary Diagnosis:	SONDRA (acute kidney injury)  Assessment and plan of treatment:	Your serum creatinine on 7/30/18 was 1.32. Follow up with your Urologist within 1 week of discharge for further management.  Secondary Diagnosis:	Hydroureteronephrosis  Assessment and plan of treatment:	Your CT showed bilateral hydroureteronephrosis to level of bladder. please follow up with your urologist within 1 week of discharge for further management.

## 2018-07-30 NOTE — DISCHARGE NOTE ADULT - SECONDARY DIAGNOSIS.
UTI (urinary tract infection) Diabetes Hypertension SONDRA (acute kidney injury) Hydroureteronephrosis

## 2018-07-30 NOTE — DISCHARGE NOTE ADULT - MEDICATION SUMMARY - MEDICATIONS TO TAKE
I will START or STAY ON the medications listed below when I get home from the hospital:    dutasteride 0.5 mg oral capsule  -- 1 cap(s) by mouth once a day  -- Indication: For BPH    Cialis 5 mg oral tablet  -- 1 tab(s) by mouth once a day  -- Indication: For Hypertension    Rapaflo 8 mg oral capsule  -- 1 cap(s) by mouth once a day  -- Indication: For BPH    gabapentin 100 mg oral capsule  -- 4 cap(s) (400mg) by mouth once a day (at bedtime)  1 cap(s) (100mg) by mouth 2 times a day, As Needed for neuropathy pain  -- Indication: For Neuropathy pain    glipiZIDE 10 mg oral tablet, extended release  -- 1 tab(s) by mouth 2 times a day  -- Indication: For Diabetes    Kombiglyze XR 5 mg-1000 mg oral tablet, extended release  -- 1 tab(s) by mouth once a day  -- Indication: For Diabetes    simvastatin 10 mg oral tablet  -- 1 tab(s) by mouth once a day (at bedtime)  -- Indication: For Hyperlipidemia    metoprolol succinate 25 mg oral tablet, extended release  -- 1 tab(s) by mouth once a day  -- Indication: For Hypertension    Klor-Con 10 mEq oral tablet, extended release  -- 1 tab(s) by mouth once a day  -- Indication: For Prophylaxis    esomeprazole 40 mg oral delayed release capsule  -- 1 cap(s) by mouth once a day 30 minutes before breakfast for GERD  -- It is very important that you take or use this exactly as directed.  Do not skip doses or discontinue unless directed by your doctor.  Obtain medical advice before taking any non-prescription drugs as some may affect the action of this medication.  Swallow whole.  Do not crush.  Take medication on an empty stomach 1 hour before or 2 to 3 hours after a meal unless otherwise directed by your doctor.    -- Indication: For GERD I will START or STAY ON the medications listed below when I get home from the hospital:    dutasteride 0.5 mg oral capsule  -- 1 cap(s) by mouth once a day  -- Indication: For BPH    Cialis 5 mg oral tablet  -- 1 tab(s) by mouth once a day  -- Indication: For Hypertension    losartan 100 mg oral tablet  -- 1 tab(s) by mouth once a day  -- Indication: For Hypertension    Rapaflo 8 mg oral capsule  -- 1 cap(s) by mouth once a day  -- Indication: For BPH    gabapentin 300 mg oral capsule  -- 1 cap(s) by mouth once a day  -- Indication: For Neuropathic Pain    glipiZIDE 10 mg oral tablet, extended release  -- 1 tab(s) by mouth 2 times a day  -- Indication: For Diabetes    Kombiglyze XR 5 mg-1000 mg oral tablet, extended release  -- 1 tab(s) by mouth once a day  -- Indication: For Diabetes    simvastatin 10 mg oral tablet  -- 1 tab(s) by mouth once a day (at bedtime)  -- Indication: For Hyperlipidemia    metoprolol succinate 25 mg oral tablet, extended release  -- 1 tab(s) by mouth once a day  -- Indication: For Hypertension    Lasix 20 mg oral tablet  -- 3 tab(s) by mouth once a day  -- Indication: For Hypertension    Klor-Con 10 mEq oral tablet, extended release  -- 1 tab(s) by mouth once a day  -- Indication: For Prophylaxis    esomeprazole 40 mg oral delayed release capsule  -- 1 cap(s) by mouth once a day 30 minutes before breakfast for GERD  -- It is very important that you take or use this exactly as directed.  Do not skip doses or discontinue unless directed by your doctor.  Obtain medical advice before taking any non-prescription drugs as some may affect the action of this medication.  Swallow whole.  Do not crush.  Take medication on an empty stomach 1 hour before or 2 to 3 hours after a meal unless otherwise directed by your doctor.    -- Indication: For GERD

## 2018-07-30 NOTE — DISCHARGE NOTE ADULT - HOSPITAL COURSE
67 yo M h/o morbid obesity, HTN, DM2, medication nonadherence, childhood polio c/b poliomyelitis (now wheelchair bound), and recent admission (6/6-6/9/18) at [facility] for b/l LE cellulitis (s/p Ancef and Keflex) presents with diarrhea, admitted for r/o C diff colitis and possible SONDRA.    Hospital Course:  Diarrhea  - cdiff negative  - Blood culture negative  - CT A/P  B/Ll hydroureteronephrosis to the level of the urinary bladder. Indeterminate lesion arising from the upper pole of right kidney  -s/p colonoscopy on 7/30    UTI  -completed course of ceftriaxone  -    B/Ll hydroureteronephrosis on CT  -Pt refused Murillo (aware of risk)  Follow up as outpatient with established urologist Dr. Yves AMARO   .Avoid NSAIDs, contrast, other nephrotoxins.      Anemia  - FOBT negative.   - hold ASA     Hypertension    - C/w metoprolol succinate 25mg once daily.   - hold losartan and lasix in setting  SONDRA - restart both once Cr stabilizes     Dispo: Home 69 yo M h/o morbid obesity, HTN, DM2, medication nonadherence, childhood polio c/b poliomyelitis (now wheelchair bound), and recent admission (6/6-6/9/18) at [facility] for b/l LE cellulitis (s/p Ancef and Keflex) presents with diarrhea, admitted for r/o C diff colitis and possible SONDRA.    Hospital Course:  Diarrhea  - cdiff negative  - Blood culture negative  - CT A/P  B/Ll hydroureteronephrosis to the level of the urinary bladder. Indeterminate lesion arising from the upper pole of right kidney  -s/p colonoscopy on 7/30    UTI  -completed course of ceftriaxone  -    B/Ll hydroureteronephrosis on CT  -Pt refused Murillo (aware of risk)  Follow up as outpatient with established urologist Dr. Yves AMARO   .Avoid NSAIDs, contrast, other nephrotoxins.      Anemia  - FOBT negative.   - hold ASA     Hypertension    - Metoprolol, Losartan, lasix    Dispo: Home

## 2018-07-30 NOTE — DISCHARGE NOTE ADULT - MEDICATION SUMMARY - MEDICATIONS TO STOP TAKING
I will STOP taking the medications listed below when I get home from the hospital:    losartan 100 mg oral tablet  -- 1 tab(s) by mouth once a day    aspirin 81 mg oral delayed release tablet  -- 1 tab(s) by mouth once a day    furosemide 20 mg oral tablet  -- 3 tab(s) by mouth once a day    furosemide 40 mg oral tablet  -- 1 tab(s) by mouth 2 times a day    losartan 50 mg oral tablet  -- 1 tab(s) by mouth once a day    omeprazole 40 mg oral delayed release capsule  -- 1 cap(s) by mouth once a day I will STOP taking the medications listed below when I get home from the hospital:    aspirin 81 mg oral delayed release tablet  -- 1 tab(s) by mouth once a day    omeprazole 40 mg oral delayed release capsule  -- 1 cap(s) by mouth once a day

## 2018-07-30 NOTE — DISCHARGE NOTE ADULT - CARE PROVIDER_API CALL
Giuliano Esparza), Internal Medicine  [address] [place] [address]  Phone: [phone]  Fax: [phone]

## 2018-07-30 NOTE — DISCHARGE NOTE ADULT - ADDITIONAL INSTRUCTIONS
Please follow up with Dr Esparza ([phone]) your PCP within 1 week of discharge for further management.  Please follow up with your Urologist Dr Marinelli within 1 week of discharge for further management.  Please follow up with Dr Deniz maxwell ([phone]) your GI doctor within 4 weeks of discharge   Please do not take any aspirin and NSAIDS medications such as motrin, aleve or advil for the next 10 days.

## 2018-07-31 ENCOUNTER — RESULT REVIEW (OUTPATIENT)
Age: 68
End: 2018-07-31

## 2018-07-31 PROCEDURE — 88305 TISSUE EXAM BY PATHOLOGIST: CPT | Mod: 26

## 2018-07-31 PROCEDURE — 88312 SPECIAL STAINS GROUP 1: CPT | Mod: 26

## 2018-08-01 LAB
BACTERIA BLD CULT: SIGNIFICANT CHANGE UP
BACTERIA BLD CULT: SIGNIFICANT CHANGE UP
SURGICAL PATHOLOGY STUDY: SIGNIFICANT CHANGE UP

## 2019-03-28 PROBLEM — I10 ESSENTIAL (PRIMARY) HYPERTENSION: Chronic | Status: ACTIVE | Noted: 2018-07-26

## 2019-04-02 ENCOUNTER — APPOINTMENT (OUTPATIENT)
Age: 69
End: 2019-04-02
Payer: MEDICARE

## 2019-04-02 VITALS — WEIGHT: 240 LBS | HEIGHT: 68 IN | BODY MASS INDEX: 36.37 KG/M2

## 2019-04-02 DIAGNOSIS — C49.9 MALIGNANT NEOPLASM OF CONNECTIVE AND SOFT TISSUE, UNSPECIFIED: ICD-10-CM

## 2019-04-02 DIAGNOSIS — A80.9 ACUTE POLIOMYELITIS, UNSPECIFIED: ICD-10-CM

## 2019-04-02 PROCEDURE — 99203 OFFICE O/P NEW LOW 30 MIN: CPT

## 2019-04-02 NOTE — HISTORY OF PRESENT ILLNESS
[FreeTextEntry1] : This is the first office 61 years old male who presented for evaluation of a soft tissue mass involving the left distal thigh. 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 Patient remained with paralysis of both lower leg weakness of the left upper extremity and the opposite age of 21 patient was using crutches weightbearing as tolerated underwent multiple surgical procedures for the last 20 years patient is confined to a wheelchair. Recently patient had a fracture of the left proximal tibia for which patient underwent intramedullary nailing. At this stage patient complaining about some fullness over the Villavicencio side of the left distal thigh

## 2019-04-02 NOTE — PHYSICAL EXAM
[FreeTextEntry1] : Physical exam reveals a patient who is fully alert oriented confined to a wheelchair complaining of soreness over the medial side of the left distal thigh on exam patient had neurolysis of both lower legs there is a soft tissue fullness over the posteromedial aspect of the distal left I suggest to be a lipomatous collection. No other finding and a dysphagia patient was recommended to be followed conservatively and to be seen in followup as needed

## 2019-10-15 ENCOUNTER — TRANSCRIPTION ENCOUNTER (OUTPATIENT)
Age: 69
End: 2019-10-15

## 2020-02-04 NOTE — CHART NOTE - NSCHARTNOTEFT_GEN_A_CORE
received call from Dr. Chisholm at 1745 requesting Urology consult for CT resuts-A/P noncontrast- bilateral hydroureteronephrosis to the level of the   urinary bladder. Mild changes of chronic bladder outlet obstruction. Indeterminate lesion arising from the upper pole of right kidney urology paged [phone]  no callback please page per dr chisholm for further management.   Renal ultra sound ordered for additional imaging.   Will have covering overnight ADS continue to page urology Modified Advancement Flap Text: The defect edges were debeveled with a #15 scalpel blade.  Given the location of the defect, shape of the defect and the proximity to free margins a modified advancement flap was deemed most appropriate.  Using a sterile surgical marker, an appropriate advancement flap was drawn incorporating the defect and placing the expected incisions within the relaxed skin tension lines where possible.    The area thus outlined was incised deep to adipose tissue with a #15 scalpel blade.  The skin margins were undermined to an appropriate distance in all directions utilizing iris scissors.

## 2020-07-01 NOTE — ED ADULT NURSE NOTE - CADM POA CENTRAL LINE
EXAMINATION:  DIGITAL DIAGNOSTIC BILATERAL, BREAST BILATERAL

 

History: Reason: ABNORMAL MAMMOGRAM / Spl. Instructions:  / History: 

 

Comparison:  1/28/2013, 1/10/2012, 1/6/2011, 2/4/2014, 2/5/2015, 3/1/2016,

3/29/2017, 04/02/2018, 04/04/2019, 6/17/2020 mammograms. 

 

Technique: Bilateral digital diagnostic mammogram views were obtained.  

CAD was utilized. 3-D tomosynthesis images were acquired.

 

 

Findings: 

Breast Tissue Density B : There are scattered areas of fibroglandular 

density.

 

The distortion involving the right upper outer breast appears similar and 

correlation with prior exams. It persists on spot compression imaging. 

Distortion involving the left upper breast also similar on prior exams.

 

Limited ultrasound imaging of the right upper outer breast and axilla as 

well as the left upper inner breast and axilla was performed. No masses or

cysts identified. No suspicious axillary lymph nodes..

 

IMPRESSION:

No mammographic evidence of malignancy. Recommend routine screening. No 

suspicious interval changes are suspected upon correlation multiple 

previous exams. No masses involving the sites of interest on ultrasound 

exam either.

 

BI-RADS category 1:  Negative.

 

The images were reviewed with computer aided detection.

 

Patient information is entered into the reminder system with a target due 

date for the next screening mammogram.

 

Mammography is the most sensitive method for finding small breast cancers,

but it does not detect them all and is not a substitute for careful 

clinical examination. A negative mammogram does not negate a clinically 

suspicious finding and should not result in delay in biopsying a 

clinically suspicious abnormality.

 

 "Our facility is accredited by the American College [employer] 

Mammography Program."

 

Electronically signed by: Adriano Brandt MD (7/1/2020 4:14 PM) [facility]2 No

## 2020-10-29 NOTE — ED PROVIDER NOTE - CONSTITUTIONAL, MLM
Cardiology normal... Well appearing, well nourished, awake, alert, oriented to person, place, time/situation and in no apparent distress.

## 2020-12-01 NOTE — ED ADULT TRIAGE NOTE - TEMPERATURE IN CELSIUS (DEGREES C)
36.5
62 y/o female with PMHx of hypothyroid, sleep apnea presents to the ED c/o shortness of breath x 2 days. Pt notes she was Dx with COVID x 1 week ago and has been having fever and diarrhea since Dx. Pt notes onset of nonproductive cough and worsening shortness of breath x 2 days. Pt denies HA, chest pain, vomiting, rash, or any other complaints. NKDA.
2019 08:17

## 2022-09-03 ENCOUNTER — INPATIENT (INPATIENT)
Facility: HOSPITAL | Age: 72
LOS: 5 days | Discharge: HOME | DRG: 481 | End: 2022-09-09
Attending: FAMILY MEDICINE | Admitting: INTERNAL MEDICINE
Payer: MEDICARE

## 2022-09-03 VITALS — HEIGHT: 68 IN

## 2022-09-03 DIAGNOSIS — S72.002A FRACTURE OF UNSPECIFIED PART OF NECK OF LEFT FEMUR, INITIAL ENCOUNTER FOR CLOSED FRACTURE: ICD-10-CM

## 2022-09-03 LAB
ALBUMIN SERPL ELPH-MCNC: 3.2 G/DL — LOW (ref 3.3–5)
ALP SERPL-CCNC: 90 U/L — SIGNIFICANT CHANGE UP (ref 40–120)
ALT FLD-CCNC: 18 U/L — SIGNIFICANT CHANGE UP (ref 12–78)
ANION GAP SERPL CALC-SCNC: 8 MMOL/L — SIGNIFICANT CHANGE UP (ref 5–17)
APTT BLD: 30 SEC — SIGNIFICANT CHANGE UP (ref 27.5–35.5)
AST SERPL-CCNC: 30 U/L — SIGNIFICANT CHANGE UP (ref 15–37)
BASOPHILS # BLD AUTO: 0.04 K/UL — SIGNIFICANT CHANGE UP (ref 0–0.2)
BASOPHILS NFR BLD AUTO: 0.3 % — SIGNIFICANT CHANGE UP (ref 0–2)
BILIRUB SERPL-MCNC: 0.6 MG/DL — SIGNIFICANT CHANGE UP (ref 0.2–1.2)
BUN SERPL-MCNC: 70 MG/DL — HIGH (ref 7–23)
CALCIUM SERPL-MCNC: 8.3 MG/DL — LOW (ref 8.5–10.1)
CHLORIDE SERPL-SCNC: 104 MMOL/L — SIGNIFICANT CHANGE UP (ref 96–108)
CO2 SERPL-SCNC: 25 MMOL/L — SIGNIFICANT CHANGE UP (ref 22–31)
CREAT SERPL-MCNC: 2.85 MG/DL — HIGH (ref 0.5–1.3)
EGFR: 23 ML/MIN/1.73M2 — LOW
EOSINOPHIL # BLD AUTO: 0.07 K/UL — SIGNIFICANT CHANGE UP (ref 0–0.5)
EOSINOPHIL NFR BLD AUTO: 0.5 % — SIGNIFICANT CHANGE UP (ref 0–6)
FLUAV AG NPH QL: SIGNIFICANT CHANGE UP
FLUBV AG NPH QL: SIGNIFICANT CHANGE UP
GLUCOSE SERPL-MCNC: 191 MG/DL — HIGH (ref 70–99)
HCT VFR BLD CALC: 36 % — LOW (ref 39–50)
HGB BLD-MCNC: 11.9 G/DL — LOW (ref 13–17)
IMM GRANULOCYTES NFR BLD AUTO: 0.4 % — SIGNIFICANT CHANGE UP (ref 0–1.5)
INR BLD: 1.15 RATIO — SIGNIFICANT CHANGE UP (ref 0.88–1.16)
LYMPHOCYTES # BLD AUTO: 0.62 K/UL — LOW (ref 1–3.3)
LYMPHOCYTES # BLD AUTO: 4.8 % — LOW (ref 13–44)
MCHC RBC-ENTMCNC: 29.2 PG — SIGNIFICANT CHANGE UP (ref 27–34)
MCHC RBC-ENTMCNC: 33.1 GM/DL — SIGNIFICANT CHANGE UP (ref 32–36)
MCV RBC AUTO: 88.2 FL — SIGNIFICANT CHANGE UP (ref 80–100)
MONOCYTES # BLD AUTO: 0.79 K/UL — SIGNIFICANT CHANGE UP (ref 0–0.9)
MONOCYTES NFR BLD AUTO: 6.1 % — SIGNIFICANT CHANGE UP (ref 2–14)
NEUTROPHILS # BLD AUTO: 11.43 K/UL — HIGH (ref 1.8–7.4)
NEUTROPHILS NFR BLD AUTO: 87.9 % — HIGH (ref 43–77)
PLATELET # BLD AUTO: 178 K/UL — SIGNIFICANT CHANGE UP (ref 150–400)
POTASSIUM SERPL-MCNC: 4.6 MMOL/L — SIGNIFICANT CHANGE UP (ref 3.5–5.3)
POTASSIUM SERPL-SCNC: 4.6 MMOL/L — SIGNIFICANT CHANGE UP (ref 3.5–5.3)
PROT SERPL-MCNC: 7.3 GM/DL — SIGNIFICANT CHANGE UP (ref 6–8.3)
PROTHROM AB SERPL-ACNC: 13.4 SEC — SIGNIFICANT CHANGE UP (ref 10.5–13.4)
RBC # BLD: 4.08 M/UL — LOW (ref 4.2–5.8)
RBC # FLD: 13.1 % — SIGNIFICANT CHANGE UP (ref 10.3–14.5)
RSV RNA NPH QL NAA+NON-PROBE: SIGNIFICANT CHANGE UP
SARS-COV-2 RNA SPEC QL NAA+PROBE: SIGNIFICANT CHANGE UP
SODIUM SERPL-SCNC: 137 MMOL/L — SIGNIFICANT CHANGE UP (ref 135–145)
WBC # BLD: 13 K/UL — HIGH (ref 3.8–10.5)
WBC # FLD AUTO: 13 K/UL — HIGH (ref 3.8–10.5)

## 2022-09-03 PROCEDURE — 82040 ASSAY OF SERUM ALBUMIN: CPT

## 2022-09-03 PROCEDURE — 85730 THROMBOPLASTIN TIME PARTIAL: CPT

## 2022-09-03 PROCEDURE — 73502 X-RAY EXAM HIP UNI 2-3 VIEWS: CPT | Mod: 26,LT

## 2022-09-03 PROCEDURE — 86901 BLOOD TYPING SEROLOGIC RH(D): CPT

## 2022-09-03 PROCEDURE — 83036 HEMOGLOBIN GLYCOSYLATED A1C: CPT

## 2022-09-03 PROCEDURE — 93005 ELECTROCARDIOGRAM TRACING: CPT

## 2022-09-03 PROCEDURE — C1889: CPT

## 2022-09-03 PROCEDURE — 81001 URINALYSIS AUTO W/SCOPE: CPT

## 2022-09-03 PROCEDURE — 85610 PROTHROMBIN TIME: CPT

## 2022-09-03 PROCEDURE — 86850 RBC ANTIBODY SCREEN: CPT

## 2022-09-03 PROCEDURE — 85025 COMPLETE CBC W/AUTO DIFF WBC: CPT

## 2022-09-03 PROCEDURE — 36415 COLL VENOUS BLD VENIPUNCTURE: CPT

## 2022-09-03 PROCEDURE — 82550 ASSAY OF CK (CPK): CPT

## 2022-09-03 PROCEDURE — 82962 GLUCOSE BLOOD TEST: CPT

## 2022-09-03 PROCEDURE — 76000 FLUOROSCOPY <1 HR PHYS/QHP: CPT

## 2022-09-03 PROCEDURE — 71045 X-RAY EXAM CHEST 1 VIEW: CPT | Mod: 26

## 2022-09-03 PROCEDURE — 93010 ELECTROCARDIOGRAM REPORT: CPT

## 2022-09-03 PROCEDURE — 73552 X-RAY EXAM OF FEMUR 2/>: CPT | Mod: 26,LT

## 2022-09-03 PROCEDURE — C1713: CPT

## 2022-09-03 PROCEDURE — 84100 ASSAY OF PHOSPHORUS: CPT

## 2022-09-03 PROCEDURE — 99285 EMERGENCY DEPT VISIT HI MDM: CPT

## 2022-09-03 PROCEDURE — 82306 VITAMIN D 25 HYDROXY: CPT

## 2022-09-03 PROCEDURE — 80048 BASIC METABOLIC PNL TOTAL CA: CPT

## 2022-09-03 PROCEDURE — 85027 COMPLETE CBC AUTOMATED: CPT

## 2022-09-03 PROCEDURE — 86803 HEPATITIS C AB TEST: CPT

## 2022-09-03 PROCEDURE — C1769: CPT

## 2022-09-03 PROCEDURE — 83735 ASSAY OF MAGNESIUM: CPT

## 2022-09-03 PROCEDURE — 97162 PT EVAL MOD COMPLEX 30 MIN: CPT | Mod: GP

## 2022-09-03 PROCEDURE — 86900 BLOOD TYPING SEROLOGIC ABO: CPT

## 2022-09-03 RX ORDER — FENTANYL CITRATE 50 UG/ML
100 INJECTION INTRAVENOUS ONCE
Refills: 0 | Status: DISCONTINUED | OUTPATIENT
Start: 2022-09-03 | End: 2022-09-03

## 2022-09-03 RX ORDER — MORPHINE SULFATE 50 MG/1
4 CAPSULE, EXTENDED RELEASE ORAL ONCE
Refills: 0 | Status: DISCONTINUED | OUTPATIENT
Start: 2022-09-03 | End: 2022-09-03

## 2022-09-03 RX ORDER — SODIUM CHLORIDE 9 MG/ML
1000 INJECTION, SOLUTION INTRAVENOUS
Refills: 0 | Status: DISCONTINUED | OUTPATIENT
Start: 2022-09-03 | End: 2022-09-09

## 2022-09-03 RX ORDER — MORPHINE SULFATE 50 MG/1
4 CAPSULE, EXTENDED RELEASE ORAL EVERY 4 HOURS
Refills: 0 | Status: DISCONTINUED | OUTPATIENT
Start: 2022-09-03 | End: 2022-09-09

## 2022-09-03 RX ADMIN — MORPHINE SULFATE 4 MILLIGRAM(S): 50 CAPSULE, EXTENDED RELEASE ORAL at 21:40

## 2022-09-03 RX ADMIN — SODIUM CHLORIDE 75 MILLILITER(S): 9 INJECTION, SOLUTION INTRAVENOUS at 22:36

## 2022-09-03 NOTE — ED PROVIDER NOTE - PHYSICAL EXAMINATION
Constitutional: NAD, well appearing  HEENT: no rhinorrhea, PERRL, no oropharyngeal erythema or exudates, midline uvula.  TMs clear.  CVS:  RRR, no m/r/g  Resp:  CTAB  GI: soft, ntnd  MSK: moderate tender to the palpations of L hip  Neuro:  A&Ox3, 5/5 strength to all extremities,  SILT to all extremities  Skin: no rash  psych: clear thought content  Heme/lymph:  No LAD

## 2022-09-03 NOTE — ED PROVIDER NOTE - PROGRESS NOTE DETAILS
Pt with hip fx.  Ortho aware.  Will require repair.  Pt otherwise well appearing.  NO other trauma.  Discussed with hospitalist who has accepted for further w/u and management.  Further care per inpatient treatment team.

## 2022-09-03 NOTE — ED PROVIDER NOTE - NS ED ROS FT
Constitutional: nad, well appearing  HEENT:  no nasal congestion, eye drainage or ear pain.    CVS:  no cp  Resp:  No sob, no cough  GI:  no abdominal pain, no nausea or vomiting  :  no dysuria  MSK: L hip pain   Skin: no rash  Neuro: no change in mental status or level of consciousness  Heme/lymph: no bleeding

## 2022-09-03 NOTE — ED ADULT NURSE NOTE - PAIN: PRESENCE, MLM
How Severe Is Your Rash?: mild Is This A New Presentation, Or A Follow-Up?: Rash Additional History: Patient was seen by a previous dermatologist who prescribed her the triamcinolone, a topical ointment, and selenium shampoo. Patient states rash in scalp has been an ongoing issue for over 1 year and the ones on her arms just began in the past week. hip/complains of pain/discomfort

## 2022-09-03 NOTE — ED PROVIDER NOTE - OBJECTIVE STATEMENT
71 y/o male w pmhx of HTN, Diabetes, and polio presents to the ED BIBEMS s/p slip and fall when getting into [facility]. pt tried to go in, pt now complaining left hip pain. denies numbness and tingling and any other complaints.

## 2022-09-03 NOTE — ED ADULT NURSE NOTE - OBJECTIVE STATEMENT
Pt BIBEMS for left hip pain. Pt states that he is non-ambulatory, but uses wheelchair and transfers independently. Slipped on the step in hot tub and now has hip pain. Sat in hot tub for 6hrs because he didn't want to disrupt the family event. Denies pain elsewhere. No sick contacts.

## 2022-09-03 NOTE — ED ADULT TRIAGE NOTE - CHIEF COMPLAINT QUOTE
Pt BIBEMS from home, as per EMS, pt was "sitting in the jacuzzi for 6 hours and now has L hip pain". HX of polio. Normally nonambulatory. Received 10mg IM Versed by EMS for pain. No other complaints.

## 2022-09-04 ENCOUNTER — TRANSCRIPTION ENCOUNTER (OUTPATIENT)
Age: 72
End: 2022-09-04

## 2022-09-04 LAB
24R-OH-CALCIDIOL SERPL-MCNC: 25.1 NG/ML — LOW (ref 30–80)
A1C WITH ESTIMATED AVERAGE GLUCOSE RESULT: 6 % — HIGH (ref 4–5.6)
ALBUMIN SERPL ELPH-MCNC: 3 G/DL — LOW (ref 3.3–5)
ANION GAP SERPL CALC-SCNC: 6 MMOL/L — SIGNIFICANT CHANGE UP (ref 5–17)
ANION GAP SERPL CALC-SCNC: 7 MMOL/L — SIGNIFICANT CHANGE UP (ref 5–17)
APPEARANCE UR: CLEAR — SIGNIFICANT CHANGE UP
APTT BLD: 30.9 SEC — SIGNIFICANT CHANGE UP (ref 27.5–35.5)
BASOPHILS # BLD AUTO: 0.03 K/UL — SIGNIFICANT CHANGE UP (ref 0–0.2)
BASOPHILS NFR BLD AUTO: 0.5 % — SIGNIFICANT CHANGE UP (ref 0–2)
BILIRUB UR-MCNC: NEGATIVE — SIGNIFICANT CHANGE UP
BUN SERPL-MCNC: 69 MG/DL — HIGH (ref 7–23)
BUN SERPL-MCNC: 69 MG/DL — HIGH (ref 7–23)
CALCIUM SERPL-MCNC: 7.9 MG/DL — LOW (ref 8.5–10.1)
CALCIUM SERPL-MCNC: 8.2 MG/DL — LOW (ref 8.5–10.1)
CHLORIDE SERPL-SCNC: 107 MMOL/L — SIGNIFICANT CHANGE UP (ref 96–108)
CHLORIDE SERPL-SCNC: 108 MMOL/L — SIGNIFICANT CHANGE UP (ref 96–108)
CK SERPL-CCNC: 195 U/L — SIGNIFICANT CHANGE UP (ref 26–308)
CO2 SERPL-SCNC: 24 MMOL/L — SIGNIFICANT CHANGE UP (ref 22–31)
CO2 SERPL-SCNC: 26 MMOL/L — SIGNIFICANT CHANGE UP (ref 22–31)
COLOR SPEC: YELLOW — SIGNIFICANT CHANGE UP
CREAT SERPL-MCNC: 2.81 MG/DL — HIGH (ref 0.5–1.3)
CREAT SERPL-MCNC: 2.83 MG/DL — HIGH (ref 0.5–1.3)
DIFF PNL FLD: ABNORMAL
EGFR: 23 ML/MIN/1.73M2 — LOW
EGFR: 23 ML/MIN/1.73M2 — LOW
EOSINOPHIL # BLD AUTO: 0.2 K/UL — SIGNIFICANT CHANGE UP (ref 0–0.5)
EOSINOPHIL NFR BLD AUTO: 3.1 % — SIGNIFICANT CHANGE UP (ref 0–6)
ESTIMATED AVERAGE GLUCOSE: 126 MG/DL — HIGH (ref 68–114)
GLUCOSE SERPL-MCNC: 125 MG/DL — HIGH (ref 70–99)
GLUCOSE SERPL-MCNC: 180 MG/DL — HIGH (ref 70–99)
GLUCOSE UR QL: NEGATIVE — SIGNIFICANT CHANGE UP
HCT VFR BLD CALC: 32.6 % — LOW (ref 39–50)
HCT VFR BLD CALC: 33.3 % — LOW (ref 39–50)
HCT VFR BLD CALC: 33.6 % — LOW (ref 39–50)
HCV AB S/CO SERPL IA: 0.12 S/CO — SIGNIFICANT CHANGE UP (ref 0–0.99)
HCV AB SERPL-IMP: SIGNIFICANT CHANGE UP
HGB BLD-MCNC: 10.7 G/DL — LOW (ref 13–17)
HGB BLD-MCNC: 11.1 G/DL — LOW (ref 13–17)
HGB BLD-MCNC: 11.2 G/DL — LOW (ref 13–17)
IMM GRANULOCYTES NFR BLD AUTO: 0.3 % — SIGNIFICANT CHANGE UP (ref 0–1.5)
INR BLD: 1.12 RATIO — SIGNIFICANT CHANGE UP (ref 0.88–1.16)
KETONES UR-MCNC: NEGATIVE — SIGNIFICANT CHANGE UP
LEUKOCYTE ESTERASE UR-ACNC: ABNORMAL
LYMPHOCYTES # BLD AUTO: 1.11 K/UL — SIGNIFICANT CHANGE UP (ref 1–3.3)
LYMPHOCYTES # BLD AUTO: 17.1 % — SIGNIFICANT CHANGE UP (ref 13–44)
MAGNESIUM SERPL-MCNC: 2.3 MG/DL — SIGNIFICANT CHANGE UP (ref 1.6–2.6)
MCHC RBC-ENTMCNC: 29.4 PG — SIGNIFICANT CHANGE UP (ref 27–34)
MCHC RBC-ENTMCNC: 29.8 PG — SIGNIFICANT CHANGE UP (ref 27–34)
MCHC RBC-ENTMCNC: 29.8 PG — SIGNIFICANT CHANGE UP (ref 27–34)
MCHC RBC-ENTMCNC: 32.8 GM/DL — SIGNIFICANT CHANGE UP (ref 32–36)
MCHC RBC-ENTMCNC: 33 GM/DL — SIGNIFICANT CHANGE UP (ref 32–36)
MCHC RBC-ENTMCNC: 33.6 GM/DL — SIGNIFICANT CHANGE UP (ref 32–36)
MCV RBC AUTO: 88.6 FL — SIGNIFICANT CHANGE UP (ref 80–100)
MCV RBC AUTO: 89.1 FL — SIGNIFICANT CHANGE UP (ref 80–100)
MCV RBC AUTO: 90.8 FL — SIGNIFICANT CHANGE UP (ref 80–100)
MONOCYTES # BLD AUTO: 0.7 K/UL — SIGNIFICANT CHANGE UP (ref 0–0.9)
MONOCYTES NFR BLD AUTO: 10.8 % — SIGNIFICANT CHANGE UP (ref 2–14)
NEUTROPHILS # BLD AUTO: 4.42 K/UL — SIGNIFICANT CHANGE UP (ref 1.8–7.4)
NEUTROPHILS NFR BLD AUTO: 68.2 % — SIGNIFICANT CHANGE UP (ref 43–77)
NITRITE UR-MCNC: NEGATIVE — SIGNIFICANT CHANGE UP
PH UR: 8 — SIGNIFICANT CHANGE UP (ref 5–8)
PHOSPHATE SERPL-MCNC: 4.8 MG/DL — HIGH (ref 2.5–4.5)
PLATELET # BLD AUTO: 169 K/UL — SIGNIFICANT CHANGE UP (ref 150–400)
PLATELET # BLD AUTO: 170 K/UL — SIGNIFICANT CHANGE UP (ref 150–400)
PLATELET # BLD AUTO: 183 K/UL — SIGNIFICANT CHANGE UP (ref 150–400)
POTASSIUM SERPL-MCNC: 3.8 MMOL/L — SIGNIFICANT CHANGE UP (ref 3.5–5.3)
POTASSIUM SERPL-MCNC: 4 MMOL/L — SIGNIFICANT CHANGE UP (ref 3.5–5.3)
POTASSIUM SERPL-SCNC: 3.8 MMOL/L — SIGNIFICANT CHANGE UP (ref 3.5–5.3)
POTASSIUM SERPL-SCNC: 4 MMOL/L — SIGNIFICANT CHANGE UP (ref 3.5–5.3)
PROT UR-MCNC: 100
PROTHROM AB SERPL-ACNC: 13 SEC — SIGNIFICANT CHANGE UP (ref 10.5–13.4)
RBC # BLD: 3.59 M/UL — LOW (ref 4.2–5.8)
RBC # BLD: 3.76 M/UL — LOW (ref 4.2–5.8)
RBC # BLD: 3.77 M/UL — LOW (ref 4.2–5.8)
RBC # FLD: 13 % — SIGNIFICANT CHANGE UP (ref 10.3–14.5)
RBC # FLD: 13.2 % — SIGNIFICANT CHANGE UP (ref 10.3–14.5)
RBC # FLD: 13.2 % — SIGNIFICANT CHANGE UP (ref 10.3–14.5)
SODIUM SERPL-SCNC: 139 MMOL/L — SIGNIFICANT CHANGE UP (ref 135–145)
SODIUM SERPL-SCNC: 139 MMOL/L — SIGNIFICANT CHANGE UP (ref 135–145)
SP GR SPEC: 1.01 — SIGNIFICANT CHANGE UP (ref 1.01–1.02)
UROBILINOGEN FLD QL: NEGATIVE — SIGNIFICANT CHANGE UP
WBC # BLD: 14.42 K/UL — HIGH (ref 3.8–10.5)
WBC # BLD: 6.48 K/UL — SIGNIFICANT CHANGE UP (ref 3.8–10.5)
WBC # BLD: 6.5 K/UL — SIGNIFICANT CHANGE UP (ref 3.8–10.5)
WBC # FLD AUTO: 14.42 K/UL — HIGH (ref 3.8–10.5)
WBC # FLD AUTO: 6.48 K/UL — SIGNIFICANT CHANGE UP (ref 3.8–10.5)
WBC # FLD AUTO: 6.5 K/UL — SIGNIFICANT CHANGE UP (ref 3.8–10.5)

## 2022-09-04 PROCEDURE — 99223 1ST HOSP IP/OBS HIGH 75: CPT

## 2022-09-04 PROCEDURE — 93010 ELECTROCARDIOGRAM REPORT: CPT

## 2022-09-04 PROCEDURE — 12345: CPT | Mod: NC

## 2022-09-04 RX ORDER — MAGNESIUM HYDROXIDE 400 MG/1
30 TABLET, CHEWABLE ORAL DAILY
Refills: 0 | Status: DISCONTINUED | OUTPATIENT
Start: 2022-09-04 | End: 2022-09-09

## 2022-09-04 RX ORDER — DEXTROSE 50 % IN WATER 50 %
25 SYRINGE (ML) INTRAVENOUS ONCE
Refills: 0 | Status: DISCONTINUED | OUTPATIENT
Start: 2022-09-04 | End: 2022-09-09

## 2022-09-04 RX ORDER — SENNA PLUS 8.6 MG/1
2 TABLET ORAL AT BEDTIME
Refills: 0 | Status: DISCONTINUED | OUTPATIENT
Start: 2022-09-04 | End: 2022-09-09

## 2022-09-04 RX ORDER — SODIUM CHLORIDE 9 MG/ML
1000 INJECTION, SOLUTION INTRAVENOUS
Refills: 0 | Status: DISCONTINUED | OUTPATIENT
Start: 2022-09-04 | End: 2022-09-09

## 2022-09-04 RX ORDER — GLUCAGON INJECTION, SOLUTION 0.5 MG/.1ML
1 INJECTION, SOLUTION SUBCUTANEOUS ONCE
Refills: 0 | Status: DISCONTINUED | OUTPATIENT
Start: 2022-09-04 | End: 2022-09-09

## 2022-09-04 RX ORDER — SODIUM CHLORIDE 9 MG/ML
1000 INJECTION INTRAMUSCULAR; INTRAVENOUS; SUBCUTANEOUS
Refills: 0 | Status: DISCONTINUED | OUTPATIENT
Start: 2022-09-04 | End: 2022-09-05

## 2022-09-04 RX ORDER — DEXTROSE 50 % IN WATER 50 %
15 SYRINGE (ML) INTRAVENOUS ONCE
Refills: 0 | Status: DISCONTINUED | OUTPATIENT
Start: 2022-09-04 | End: 2022-09-09

## 2022-09-04 RX ORDER — OXYCODONE HYDROCHLORIDE 5 MG/1
2.5 TABLET ORAL EVERY 4 HOURS
Refills: 0 | Status: DISCONTINUED | OUTPATIENT
Start: 2022-09-04 | End: 2022-09-09

## 2022-09-04 RX ORDER — TADALAFIL 10 MG/1
1 TABLET, FILM COATED ORAL
Qty: 0 | Refills: 0 | DISCHARGE

## 2022-09-04 RX ORDER — ONDANSETRON 8 MG/1
4 TABLET, FILM COATED ORAL EVERY 6 HOURS
Refills: 0 | Status: DISCONTINUED | OUTPATIENT
Start: 2022-09-04 | End: 2022-09-09

## 2022-09-04 RX ORDER — DUTASTERIDE 0.5 MG/1
1 CAPSULE, LIQUID FILLED ORAL
Qty: 0 | Refills: 0 | DISCHARGE

## 2022-09-04 RX ORDER — INSULIN LISPRO 100/ML
VIAL (ML) SUBCUTANEOUS
Refills: 0 | Status: DISCONTINUED | OUTPATIENT
Start: 2022-09-04 | End: 2022-09-09

## 2022-09-04 RX ORDER — ENOXAPARIN SODIUM 100 MG/ML
30 INJECTION SUBCUTANEOUS EVERY 24 HOURS
Refills: 0 | Status: DISCONTINUED | OUTPATIENT
Start: 2022-09-04 | End: 2022-09-04

## 2022-09-04 RX ORDER — SIMVASTATIN 20 MG/1
1 TABLET, FILM COATED ORAL
Qty: 0 | Refills: 0 | DISCHARGE

## 2022-09-04 RX ORDER — HYDRALAZINE HCL 50 MG
10 TABLET ORAL EVERY 8 HOURS
Refills: 0 | Status: DISCONTINUED | OUTPATIENT
Start: 2022-09-04 | End: 2022-09-05

## 2022-09-04 RX ORDER — POLYETHYLENE GLYCOL 3350 17 G/17G
17 POWDER, FOR SOLUTION ORAL DAILY
Refills: 0 | Status: DISCONTINUED | OUTPATIENT
Start: 2022-09-04 | End: 2022-09-09

## 2022-09-04 RX ORDER — METOPROLOL TARTRATE 50 MG
25 TABLET ORAL ONCE
Refills: 0 | Status: COMPLETED | OUTPATIENT
Start: 2022-09-04 | End: 2022-09-04

## 2022-09-04 RX ORDER — LANOLIN ALCOHOL/MO/W.PET/CERES
3 CREAM (GRAM) TOPICAL AT BEDTIME
Refills: 0 | Status: DISCONTINUED | OUTPATIENT
Start: 2022-09-04 | End: 2022-09-09

## 2022-09-04 RX ORDER — INSULIN LISPRO 100/ML
VIAL (ML) SUBCUTANEOUS AT BEDTIME
Refills: 0 | Status: DISCONTINUED | OUTPATIENT
Start: 2022-09-04 | End: 2022-09-09

## 2022-09-04 RX ORDER — ACETAMINOPHEN 500 MG
975 TABLET ORAL EVERY 8 HOURS
Refills: 0 | Status: DISCONTINUED | OUTPATIENT
Start: 2022-09-04 | End: 2022-09-09

## 2022-09-04 RX ORDER — SAXAGLIPTIN AND METFORMIN HYDROCHLORIDE 1000; 5 MG/1; MG/1
1 TABLET, FILM COATED, EXTENDED RELEASE ORAL
Qty: 0 | Refills: 0 | DISCHARGE

## 2022-09-04 RX ORDER — SILODOSIN 4 MG/1
1 CAPSULE ORAL
Qty: 0 | Refills: 0 | DISCHARGE

## 2022-09-04 RX ORDER — CEFTRIAXONE 500 MG/1
1000 INJECTION, POWDER, FOR SOLUTION INTRAMUSCULAR; INTRAVENOUS EVERY 24 HOURS
Refills: 0 | Status: DISCONTINUED | OUTPATIENT
Start: 2022-09-04 | End: 2022-09-04

## 2022-09-04 RX ORDER — FAMOTIDINE 10 MG/ML
20 INJECTION INTRAVENOUS EVERY 12 HOURS
Refills: 0 | Status: DISCONTINUED | OUTPATIENT
Start: 2022-09-04 | End: 2022-09-09

## 2022-09-04 RX ORDER — POTASSIUM CHLORIDE 20 MEQ
1 PACKET (EA) ORAL
Qty: 0 | Refills: 0 | DISCHARGE

## 2022-09-04 RX ORDER — SENNA PLUS 8.6 MG/1
2 TABLET ORAL AT BEDTIME
Refills: 0 | Status: DISCONTINUED | OUTPATIENT
Start: 2022-09-04 | End: 2022-09-04

## 2022-09-04 RX ORDER — CEFAZOLIN SODIUM 1 G
2000 VIAL (EA) INJECTION EVERY 8 HOURS
Refills: 0 | Status: COMPLETED | OUTPATIENT
Start: 2022-09-04 | End: 2022-09-05

## 2022-09-04 RX ORDER — CEFTRIAXONE 500 MG/1
1000 INJECTION, POWDER, FOR SOLUTION INTRAMUSCULAR; INTRAVENOUS EVERY 24 HOURS
Refills: 0 | Status: COMPLETED | OUTPATIENT
Start: 2022-09-04 | End: 2022-09-06

## 2022-09-04 RX ORDER — AMLODIPINE BESYLATE 2.5 MG/1
5 TABLET ORAL ONCE
Refills: 0 | Status: COMPLETED | OUTPATIENT
Start: 2022-09-04 | End: 2022-09-04

## 2022-09-04 RX ORDER — OXYCODONE HYDROCHLORIDE 5 MG/1
5 TABLET ORAL EVERY 4 HOURS
Refills: 0 | Status: DISCONTINUED | OUTPATIENT
Start: 2022-09-04 | End: 2022-09-09

## 2022-09-04 RX ORDER — DEXTROSE 50 % IN WATER 50 %
12.5 SYRINGE (ML) INTRAVENOUS ONCE
Refills: 0 | Status: DISCONTINUED | OUTPATIENT
Start: 2022-09-04 | End: 2022-09-09

## 2022-09-04 RX ADMIN — Medication 25 MILLIGRAM(S): at 11:35

## 2022-09-04 RX ADMIN — MORPHINE SULFATE 4 MILLIGRAM(S): 50 CAPSULE, EXTENDED RELEASE ORAL at 09:50

## 2022-09-04 RX ADMIN — MORPHINE SULFATE 4 MILLIGRAM(S): 50 CAPSULE, EXTENDED RELEASE ORAL at 05:41

## 2022-09-04 RX ADMIN — Medication 1: at 17:22

## 2022-09-04 RX ADMIN — SODIUM CHLORIDE 75 MILLILITER(S): 9 INJECTION INTRAMUSCULAR; INTRAVENOUS; SUBCUTANEOUS at 17:22

## 2022-09-04 RX ADMIN — Medication 3 MILLIGRAM(S): at 21:52

## 2022-09-04 RX ADMIN — MORPHINE SULFATE 4 MILLIGRAM(S): 50 CAPSULE, EXTENDED RELEASE ORAL at 09:35

## 2022-09-04 RX ADMIN — Medication 100 MILLIGRAM(S): at 21:50

## 2022-09-04 RX ADMIN — Medication 975 MILLIGRAM(S): at 21:50

## 2022-09-04 RX ADMIN — AMLODIPINE BESYLATE 5 MILLIGRAM(S): 2.5 TABLET ORAL at 11:35

## 2022-09-04 RX ADMIN — MORPHINE SULFATE 4 MILLIGRAM(S): 50 CAPSULE, EXTENDED RELEASE ORAL at 00:47

## 2022-09-04 RX ADMIN — CEFTRIAXONE 100 MILLIGRAM(S): 500 INJECTION, POWDER, FOR SOLUTION INTRAMUSCULAR; INTRAVENOUS at 10:26

## 2022-09-04 RX ADMIN — FAMOTIDINE 20 MILLIGRAM(S): 10 INJECTION INTRAVENOUS at 21:50

## 2022-09-04 RX ADMIN — Medication 10 MILLIGRAM(S): at 10:26

## 2022-09-04 NOTE — H&P ADULT - NSHPPHYSICALEXAM_GEN_ALL_CORE
Vital Signs Last 24 Hrs  T(C): 36.7 (04 Sep 2022 00:45), Max: 36.7 (04 Sep 2022 00:45)  T(F): 98 (04 Sep 2022 00:45), Max: 98 (04 Sep 2022 00:45)  HR: 84 (04 Sep 2022 00:45) (68 - 84)  BP: 163/76 (04 Sep 2022 00:45) (163/76 - 199/71)  BP(mean): 99 (04 Sep 2022 00:45) (99 - 103)  RR: 18 (04 Sep 2022 00:45) (18 - 18)  SpO2: 99% (04 Sep 2022 00:45) (97% - 99%)    Parameters below as of 04 Sep 2022 00:45  Patient On (Oxygen Delivery Method): room air

## 2022-09-04 NOTE — PROGRESS NOTE ADULT - SUBJECTIVE AND OBJECTIVE BOX
Reason for Admission: L hip fracture  History of Present Illness:   72 year old male w hx polio, nonambulatory who uses wheelchair independently, CKD came to ED by EMS c/o L hip/leg pain      Non-ambulatory, but uses wheelchair and transfers independently   Slipped on the step in hot tub and now w c/o severe L hip pain after feeling his leg twist  Remained in hot tub for 6 hrs because he didn't want to disrupt grandson's birthday.     Denied pain elsewhere    Pt does not take any medicine after losing 100 lbs      In ED /72   HR 68   RR$ 18   T 97.9    97% sat RA  CXR no acute infiltrate  xrays + +L hip  fx  ortho consulted    REVIEW OF SYSTEMS:  General: NAD, hemodynamically stable, (-)  fever, (-) chills, (-) weakness  HEENT:  Eyes:  No visual loss, blurred vision, double vision or yellow sclerae. Ears, Nose, Throat:  No hearing loss, sneezing, congestion, runny nose or sore throat.  SKIN:  No rash or itching.  CARDIOVASCULAR:  No chest pain, chest pressure or chest discomfort. No palpitations or edema.  RESPIRATORY:  No shortness of breath, cough or sputum.  GASTROINTESTINAL:  No anorexia, nausea, vomiting or diarrhea. No abdominal pain or blood.  NEUROLOGICAL:  No headache, dizziness, syncope, paralysis, ataxia, numbness or tingling in the extremities. No change in bowel or bladder control.  MUSCULOSKELETAL:  No muscle, back pain, joint pain or stiffness.  HEMATOLOGIC:  No anemia, bleeding or bruising.  LYMPHATICS:  No enlarged nodes. No history of splenectomy.  ENDOCRINOLOGIC:  No reports of sweating, cold or heat intolerance. No polyuria or polydipsia.  ALLERGIES:  No history of asthma, hives, eczema or rhinitis.    Physical Exam:   GENERAL APPEARANCE:  NAD, hemodynamically stable  T(C): 36.7 (22 @ 00:45), Max: 36.7 (22 @ 00:45)  HR: 84 (22 @ 00:45) (68 - 84)  BP: 163/76 (22 @ 00:45) (163/76 - 199/71)  RR: 18 (22 @ 00:45) (18 - 18)  SpO2: 99% (22 @ 00:45) (97% - 99%)  Wt(kg): --  HEENT:  Head is normocephalic    Skin:  Warm and dry without any rash   NECK:  Supple without lymphadenopathy.   HEART:  Regular rate and rhythm. normal S1 and S2, No M/R/G  LUNGS:  Good ins/exp effort, no W/R/R/C  ABDOMEN:  Soft, nontender, nondistended with good bowel sounds heard  EXTREMITIES:  Without cyanosis, clubbing or edema.   NEUROLOGICAL:  Gross nonfocal       CBC Full  -  ( 03 Sep 2022 22:27 )  WBC Count : 13.00 K/uL  RBC Count : 4.08 M/uL  Hemoglobin : 11.9 g/dL  Hematocrit : 36.0 %  Platelet Count - Automated : 178 K/uL  Mean Cell Volume : 88.2 fl  Mean Cell Hemoglobin : 29.2 pg  Mean Cell Hemoglobin Concentration : 33.1 gm/dL  Auto Neutrophil # : 11.43 K/uL  Auto Lymphocyte # : 0.62 K/uL  Auto Monocyte # : 0.79 K/uL  Auto Eosinophil # : 0.07 K/uL  Auto Basophil # : 0.04 K/uL  Auto Neutrophil % : 87.9 %  Auto Lymphocyte % : 4.8 %  Auto Monocyte % : 6.1 %  Auto Eosinophil % : 0.5 %  Auto Basophil % : 0.3 %    PT/INR - ( 03 Sep 2022 22:27 )   PT: 13.4 sec;   INR: 1.15 ratio         PTT - ( 03 Sep 2022 22:27 )  PTT:30.0 sec  Urinalysis Basic - ( 04 Sep 2022 08:30 )    Color: Yellow / Appearance: Clear / S.015 / pH: x  Gluc: x / Ketone: Negative  / Bili: Negative / Urobili: Negative   Blood: x / Protein: 100 / Nitrite: Negative   Leuk Esterase: Moderate / RBC: x / WBC x   Sq Epi: x / Non Sq Epi: x / Bacteria: x          137  |  104  |  70<H>  ----------------------------<  191<H>  4.6   |  25  |  2.85<H>    Ca    8.3<L>      03 Sep 2022 22:27    TPro  7.3  /  Alb  3.2<L>  /  TBili  0.6  /  DBili  x   /  AST  30  /  ALT  18  /  AlkPhos  90      72 year old male w hx polio, nonambulatory, hx DM II (states none since 100 lb weight loss  being admitted w     #Acute L hip fracture  - patient is moderate risk of brooke /  post surgical complications for low risk surgical intervention  - patient is anticipated to go to OR today    #elevated blood glucose 191  #DM   - ISS    #CKD w baseline GFR 30 likely CKD 3b  - IV hydration  - UA not suggestive of active infection    #Elevated BP - this is most likely secondary to acute hip fracture  - no longer on meds after weight loss meds  - initial elevation due to pain initially in ED      #EKG NSR w increased QT  1. Mg in AM  2. monitor use of zofran or other meds that interfere w QT prolongation      #Lymphedema  chronic   #Polio as a child  #Nonambulatory but was able to stand and able to transfer into wheelchair independently      #VTE  ordered for post op as lovenox 30 mg given CKD) until weight adjusted for optimal dosing      Prior to OR for L hip fracture, pt needs routine UA to make sure no infection; will then need ABx started        Reason for Admission: L hip fracture  History of Present Illness:   72 year old male w hx polio, nonambulatory who uses wheelchair independently, CKD came to ED by EMS c/o L hip/leg pain      Non-ambulatory, but uses wheelchair and transfers independently   Slipped on the step in hot tub and now w c/o severe L hip pain after feeling his leg twist  Remained in hot tub for 6 hrs because he didn't want to disrupt grandson's birthday.     Denied pain elsewhere    Pt does not take any medicine after losing 100 lbs      In ED /72   HR 68   RR$ 18   T 97.9    97% sat RA  CXR no acute infiltrate  xrays + +L hip  fx    Patient notes of extensive past surgical history -  no complications with anesthesia No chest pain or SOB with exertion. EKG shows no acute isachemic changes. XRAY shows no infiltrates.   - Given patient's age and advanced medical history  - pt is moderate risk of brooke /  post surgical complications for low risk surgical intervention       REVIEW OF SYSTEMS:  General: NAD, hemodynamically stable  HEENT:  Eyes:  No visual loss, blurred vision, double vision or yellow sclerae. Ears, Nose, Throat:  No hearing loss, sneezing, congestion, runny nose or sore throat.  SKIN:  No rash or itching.  CARDIOVASCULAR:  No chest pain, chest pressure or chest discomfort. No palpitations or edema.  RESPIRATORY:  No shortness of breath, cough or sputum.  GASTROINTESTINAL:  No anorexia, nausea, vomiting or diarrhea. No abdominal pain or blood.  NEUROLOGICAL:  No headache, dizziness, syncope, paralysis, ataxia, numbness or tingling in the extremities. No change in bowel or bladder control.  MUSCULOSKELETAL:  No muscle, back pain, joint pain or stiffness.  HEMATOLOGIC:  No anemia, bleeding or bruising.  LYMPHATICS:  No enlarged nodes. No history of splenectomy.  ENDOCRINOLOGIC:  No reports of sweating, cold or heat intolerance. No polyuria or polydipsia.  ALLERGIES:  No history of asthma, hives, eczema or rhinitis.    Physical Exam:   GENERAL APPEARANCE:  NAD, hemodynamically stable  T(C): 36.7 (22 @ 00:45), Max: 36.7 (22 @ 00:45)  HR: 84 (22 @ 00:45) (68 - 84)  BP: 163/76 (22 @ 00:45) (163/76 - 199/71)  RR: 18 (22 @ 00:45) (18 - 18)  SpO2: 99% (22 @ 00:45) (97% - 99%)  Wt(kg): --  HEENT:  Head is normocephalic    Skin:  Warm and dry without any rash   NECK:  Supple without lymphadenopathy.   HEART:  Regular rate and rhythm. normal S1 and S2, No M/R/G  LUNGS:  Good ins/exp effort, no W/R/R/C  ABDOMEN:  Soft, nontender, nondistended with good bowel sounds heard  EXTREMITIES:  Without cyanosis, clubbing or edema.   NEUROLOGICAL:  Gross nonfocal       CBC Full  -  ( 03 Sep 2022 22:27 )  WBC Count : 13.00 K/uL  RBC Count : 4.08 M/uL  Hemoglobin : 11.9 g/dL  Hematocrit : 36.0 %  Platelet Count - Automated : 178 K/uL  Mean Cell Volume : 88.2 fl  Mean Cell Hemoglobin : 29.2 pg  Mean Cell Hemoglobin Concentration : 33.1 gm/dL  Auto Neutrophil # : 11.43 K/uL  Auto Lymphocyte # : 0.62 K/uL  Auto Monocyte # : 0.79 K/uL  Auto Eosinophil # : 0.07 K/uL  Auto Basophil # : 0.04 K/uL  Auto Neutrophil % : 87.9 %  Auto Lymphocyte % : 4.8 %  Auto Monocyte % : 6.1 %  Auto Eosinophil % : 0.5 %  Auto Basophil % : 0.3 %    PT/INR - ( 03 Sep 2022 22:27 )   PT: 13.4 sec;   INR: 1.15 ratio         PTT - ( 03 Sep 2022 22:27 )  PTT:30.0 sec  Urinalysis Basic - ( 04 Sep 2022 08:30 )    Color: Yellow / Appearance: Clear / S.015 / pH: x  Gluc: x / Ketone: Negative  / Bili: Negative / Urobili: Negative   Blood: x / Protein: 100 / Nitrite: Negative   Leuk Esterase: Moderate / RBC: x / WBC x   Sq Epi: x / Non Sq Epi: x / Bacteria: x          137  |  104  |  70<H>  ----------------------------<  191<H>  4.6   |  25  |  2.85<H>    Ca    8.3<L>      03 Sep 2022 22:27    TPro  7.3  /  Alb  3.2<L>  /  TBili  0.6  /  DBili  x   /  AST  30  /  ALT  18  /  AlkPhos  90      72 year old male w hx polio, nonambulatory, hx DM II (states none since 100 lb weight loss  being admitted w     #Acute L hip fracture  - patient is moderate risk of brooke /  post surgical complications for low risk surgical intervention  - patient is anticipated to go to OR today    #elevated blood glucose 191  #DM   - ISS    #CKD w baseline GFR 30 likely CKD 3b  - IV hydration  - UA not suggestive of active infection    #Elevated BP - this is most likely secondary to acute hip fracture  - no longer on meds after weight loss meds  - initial elevation due to pain initially in ED      #EKG NSR w increased QT  1. Mg in AM  2. monitor use of zofran or other meds that interfere w QT prolongation      #Lymphedema  chronic   #Polio as a child  #Nonambulatory but was able to stand and able to transfer into wheelchair independently      #VTE  ordered for post op as lovenox 30 mg given CKD) until weight adjusted for optimal dosing      Prior to OR for L hip fracture, pt needs routine UA to make sure no infection; will then need ABx started        Reason for Admission: L hip fracture  History of Present Illness:   72 year old male w hx polio, nonambulatory who uses wheelchair independently, CKD came to ED by EMS c/o L hip/leg pain      Non-ambulatory, but uses wheelchair and transfers independently   Slipped on the step in hot tub and now w c/o severe L hip pain after feeling his leg twist  Remained in hot tub for 6 hrs because he didn't want to disrupt grandson's birthday.     Denied pain elsewhere    Pt does not take any medicine after losing 100 lbs      In ED /72   HR 68   RR$ 18   T 97.9    97% sat RA  CXR no acute infiltrate  xrays + +L hip  fx    Patient notes of extensive past surgical history -  no complications with anesthesia No chest pain or SOB with exertion. EKG shows no acute isachemic changes. XRAY shows no infiltrates.   - Given patient's age and advanced medical history  - pt is moderate risk of brooke /  post surgical complications for low risk surgical intervention       REVIEW OF SYSTEMS:  General: NAD, hemodynamically stable  HEENT:  Eyes:  No visual loss, blurred vision, double vision or yellow sclerae. Ears, Nose, Throat:  No hearing loss, sneezing, congestion, runny nose or sore throat.  SKIN:  No rash or itching.  CARDIOVASCULAR:  No chest pain, chest pressure or chest discomfort. No palpitations or edema.  RESPIRATORY:  No shortness of breath, cough or sputum.  GASTROINTESTINAL:  No anorexia, nausea, vomiting or diarrhea. No abdominal pain or blood.  NEUROLOGICAL:  No headache, dizziness, syncope, paralysis, ataxia, numbness or tingling in the extremities. No change in bowel or bladder control.  MUSCULOSKELETAL:  No muscle, back pain, joint pain or stiffness.  HEMATOLOGIC:  No anemia, bleeding or bruising.  LYMPHATICS:  No enlarged nodes. No history of splenectomy.  ENDOCRINOLOGIC:  No reports of sweating, cold or heat intolerance. No polyuria or polydipsia.  ALLERGIES:  No history of asthma, hives, eczema or rhinitis.    Physical Exam:   GENERAL APPEARANCE:  NAD, hemodynamically stable  T(C): 36.7 (22 @ 00:45), Max: 36.7 (22 @ 00:45)  HR: 84 (22 @ 00:45) (68 - 84)  BP: 163/76 (22 @ 00:45) (163/76 - 199/71)  RR: 18 (22 @ 00:45) (18 - 18)  SpO2: 99% (22 @ 00:45) (97% - 99%)  Wt(kg): --  HEENT:  Head is normocephalic    Skin:  Warm and dry without any rash   NECK:  Supple without lymphadenopathy.   HEART:  Regular rate and rhythm. normal S1 and S2, No M/R/G  LUNGS:  Good ins/exp effort, no W/R/R/C  ABDOMEN:  Soft, nontender, nondistended with good bowel sounds heard  EXTREMITIES:  Without cyanosis, clubbing or edema.   NEUROLOGICAL:  Gross nonfocal       CBC Full  -  ( 03 Sep 2022 22:27 )  WBC Count : 13.00 K/uL  RBC Count : 4.08 M/uL  Hemoglobin : 11.9 g/dL  Hematocrit : 36.0 %  Platelet Count - Automated : 178 K/uL  Mean Cell Volume : 88.2 fl  Mean Cell Hemoglobin : 29.2 pg  Mean Cell Hemoglobin Concentration : 33.1 gm/dL  Auto Neutrophil # : 11.43 K/uL  Auto Lymphocyte # : 0.62 K/uL  Auto Monocyte # : 0.79 K/uL  Auto Eosinophil # : 0.07 K/uL  Auto Basophil # : 0.04 K/uL  Auto Neutrophil % : 87.9 %  Auto Lymphocyte % : 4.8 %  Auto Monocyte % : 6.1 %  Auto Eosinophil % : 0.5 %  Auto Basophil % : 0.3 %    PT/INR - ( 03 Sep 2022 22:27 )   PT: 13.4 sec;   INR: 1.15 ratio         PTT - ( 03 Sep 2022 22:27 )  PTT:30.0 sec  Urinalysis Basic - ( 04 Sep 2022 08:30 )    Color: Yellow / Appearance: Clear / S.015 / pH: x  Gluc: x / Ketone: Negative  / Bili: Negative / Urobili: Negative   Blood: x / Protein: 100 / Nitrite: Negative   Leuk Esterase: Moderate / RBC: x / WBC x   Sq Epi: x / Non Sq Epi: x / Bacteria: x          137  |  104  |  70<H>  ----------------------------<  191<H>  4.6   |  25  |  2.85<H>    Ca    8.3<L>      03 Sep 2022 22:27    TPro  7.3  /  Alb  3.2<L>  /  TBili  0.6  /  DBili  x   /  AST  30  /  ALT  18  /  AlkPhos  90  -    72 year old male w hx polio, nonambulatory, hx DM II (states none since 100 lb weight loss  being admitted w     #Acute L hip fracture  - patient is moderate risk of brooke /  post surgical complications for low risk surgical intervention  - patient is anticipated to go to OR today    #elevated blood glucose 191  #DM   - ISS    #CKD w baseline GFR 30 likely CKD 3b  - IV hydration  - UA not suggestive of active infection  - patient had renal problems over 10 years now -  sees nephrology in [place]    # Leukocytosis in the setting of abnormal UA  - start IV hydration  - IV rocephin  - pending urine cultures    #Elevated BP - this is most likely secondary to acute hip fracture  - no longer on meds after weight loss meds  - initial elevation due to pain initially in ED      #EKG NSR w increased QT  1. Mg in AM  2. monitor use of zofran or other meds that interfere w QT prolongation      #Lymphedema  chronic     #Polio as a child  #Nonambulatory but was able to stand and able to transfer into wheelchair independently      #VTE  ordered for post op as lovenox 30 mg given CKD) until weight adjusted for optimal dosing              Reason for Admission: L hip fracture  History of Present Illness:   72 year old male w hx polio, nonambulatory who uses wheelchair independently, CKD came to ED by EMS c/o L hip/leg pain      Non-ambulatory, but uses wheelchair and transfers independently   Slipped on the step in hot tub and now w c/o severe L hip pain after feeling his leg twist  Remained in hot tub for 6 hrs because he didn't want to disrupt grandson's birthday.     Denied pain elsewhere    Pt does not take any medicine after losing 100 lbs      In ED /72   HR 68   RR$ 18   T 97.9    97% sat RA  CXR no acute infiltrate  xrays + +L hip  fx    Patient notes of extensive past surgical history -  no complications with anesthesia No chest pain or SOB with exertion. EKG shows no acute isachemic changes. XRAY shows no infiltrates.   - Given patient's age and advanced medical history  - pt is moderate risk of brooke /  post surgical complications for low risk surgical intervention       REVIEW OF SYSTEMS:  General: NAD, hemodynamically stable  HEENT:  Eyes:  No visual loss, blurred vision, double vision or yellow sclerae. Ears, Nose, Throat:  No hearing loss, sneezing, congestion, runny nose or sore throat.  SKIN:  No rash or itching.  CARDIOVASCULAR:  No chest pain, chest pressure or chest discomfort. No palpitations or edema.  RESPIRATORY:  No shortness of breath, cough or sputum.  GASTROINTESTINAL:  No anorexia, nausea, vomiting or diarrhea. No abdominal pain or blood.  NEUROLOGICAL:  No headache, dizziness, syncope, paralysis, ataxia, numbness or tingling in the extremities. No change in bowel or bladder control.  MUSCULOSKELETAL:  No muscle, back pain, joint pain or stiffness.  HEMATOLOGIC:  No anemia, bleeding or bruising.  LYMPHATICS:  No enlarged nodes. No history of splenectomy.  ENDOCRINOLOGIC:  No reports of sweating, cold or heat intolerance. No polyuria or polydipsia.  ALLERGIES:  No history of asthma, hives, eczema or rhinitis.    Physical Exam:   GENERAL APPEARANCE:  NAD, hemodynamically stable  T(C): 36.7 (22 @ 00:45), Max: 36.7 (22 @ 00:45)  HR: 84 (22 @ 00:45) (68 - 84)  BP: 163/76 (22 @ 00:45) (163/76 - 199/71)  RR: 18 (22 @ 00:45) (18 - 18)  SpO2: 99% (22 @ 00:45) (97% - 99%)  Wt(kg): --  HEENT:  Head is normocephalic    Skin:  Warm and dry without any rash   NECK:  Supple without lymphadenopathy.   HEART:  Regular rate and rhythm. normal S1 and S2, No M/R/G  LUNGS:  Good ins/exp effort, no W/R/R/C  ABDOMEN:  Soft, nontender, nondistended with good bowel sounds heard  EXTREMITIES:  Without cyanosis, clubbing or edema.   NEUROLOGICAL:  Gross nonfocal       CBC Full  -  ( 03 Sep 2022 22:27 )  WBC Count : 13.00 K/uL  RBC Count : 4.08 M/uL  Hemoglobin : 11.9 g/dL  Hematocrit : 36.0 %  Platelet Count - Automated : 178 K/uL  Mean Cell Volume : 88.2 fl  Mean Cell Hemoglobin : 29.2 pg  Mean Cell Hemoglobin Concentration : 33.1 gm/dL  Auto Neutrophil # : 11.43 K/uL  Auto Lymphocyte # : 0.62 K/uL  Auto Monocyte # : 0.79 K/uL  Auto Eosinophil # : 0.07 K/uL  Auto Basophil # : 0.04 K/uL  Auto Neutrophil % : 87.9 %  Auto Lymphocyte % : 4.8 %  Auto Monocyte % : 6.1 %  Auto Eosinophil % : 0.5 %  Auto Basophil % : 0.3 %    PT/INR - ( 03 Sep 2022 22:27 )   PT: 13.4 sec;   INR: 1.15 ratio         PTT - ( 03 Sep 2022 22:27 )  PTT:30.0 sec  Urinalysis Basic - ( 04 Sep 2022 08:30 )    Color: Yellow / Appearance: Clear / S.015 / pH: x  Gluc: x / Ketone: Negative  / Bili: Negative / Urobili: Negative   Blood: x / Protein: 100 / Nitrite: Negative   Leuk Esterase: Moderate / RBC: x / WBC x   Sq Epi: x / Non Sq Epi: x / Bacteria: x          137  |  104  |  70<H>  ----------------------------<  191<H>  4.6   |  25  |  2.85<H>    Ca    8.3<L>      03 Sep 2022 22:27    TPro  7.3  /  Alb  3.2<L>  /  TBili  0.6  /  DBili  x   /  AST  30  /  ALT  18  /  AlkPhos  90  -    72 year old male w hx polio, nonambulatory, hx DM II (states none since 100 lb weight loss  being admitted w     #Acute L hip fracture  - patient is moderate risk of brooke /  post surgical complications for low risk surgical intervention  - patient is anticipated to go to OR today    #elevated blood glucose 191  #DM   - ISS    #CKD w baseline GFR 30 likely CKD 3b  - IV hydration  - UA not suggestive of active infection  - patient had renal problems over 10 years now -  sees nephrology in [place]    # Leukocytosis in the setting of abnormal UA  - start IV hydration  - IV rocephin  - pending urine cultures    #Elevated BP - this is most likely secondary to acute hip fracture /  pain  - started on Norvasc / BB  - IV hydralazine PRN  - no longer on meds after weight loss meds  - initial elevation due to pain initially in ED      #EKG NSR w increased QT  1. Mg in AM  2. monitor use of zofran or other meds that interfere w QT prolongation      #Lymphedema  chronic     #Polio as a child  #Nonambulatory but was able to stand and able to transfer into wheelchair independently      #VTE  ordered for post op as lovenox 30 mg given CKD) until weight adjusted for optimal dosing              Reason for Admission: L hip fracture  History of Present Illness:   72 year old male w hx polio, nonambulatory who uses wheelchair independently, CKD came to ED by EMS c/o L hip/leg pain      Non-ambulatory, but uses wheelchair and transfers independently   Slipped on the step in hot tub and now w c/o severe L hip pain after feeling his leg twist  Remained in hot tub for 6 hrs because he didn't want to disrupt grandson's birthday.     Denied pain elsewhere    Pt does not take any medicine after losing 100 lbs      In ED /72   HR 68   RR$ 18   T 97.9    97% sat RA  CXR no acute infiltrate  xrays + +L hip  fx    Patient notes of extensive past surgical history -  no complications with anesthesia No chest pain or SOB with exertion. EKG shows no acute isachemic changes. XRAY shows no infiltrates.   - Given patient's age and advanced medical history  - pt is moderate risk of brooke /  post surgical complications for low risk surgical intervention. Patient's Blood pressures currently 160 /  67, ok to proceed with surgery.        REVIEW OF SYSTEMS:  General: NAD, hemodynamically stable  HEENT:  Eyes:  No visual loss, blurred vision, double vision or yellow sclerae. Ears, Nose, Throat:  No hearing loss, sneezing, congestion, runny nose or sore throat.  SKIN:  No rash or itching.  CARDIOVASCULAR:  No chest pain, chest pressure or chest discomfort. No palpitations or edema.  RESPIRATORY:  No shortness of breath, cough or sputum.  GASTROINTESTINAL:  No anorexia, nausea, vomiting or diarrhea. No abdominal pain or blood.  NEUROLOGICAL:  No headache, dizziness, syncope, paralysis, ataxia, numbness or tingling in the extremities. No change in bowel or bladder control.  MUSCULOSKELETAL:  No muscle, back pain, joint pain or stiffness.  HEMATOLOGIC:  No anemia, bleeding or bruising.  LYMPHATICS:  No enlarged nodes. No history of splenectomy.  ENDOCRINOLOGIC:  No reports of sweating, cold or heat intolerance. No polyuria or polydipsia.  ALLERGIES:  No history of asthma, hives, eczema or rhinitis.    Physical Exam:   GENERAL APPEARANCE:  NAD, hemodynamically stable  T(C): 36.7 (22 @ 00:45), Max: 36.7 (22 @ 00:45)  HR: 84 (22 @ 00:45) (68 - 84)  BP: 163/76 (22 @ 00:45) (163/76 - 199/71)  RR: 18 (22 @ 00:45) (18 - 18)  SpO2: 99% (22 @ 00:45) (97% - 99%)  Wt(kg): --  HEENT:  Head is normocephalic    Skin:  Warm and dry without any rash   NECK:  Supple without lymphadenopathy.   HEART:  Regular rate and rhythm. normal S1 and S2, No M/R/G  LUNGS:  Good ins/exp effort, no W/R/R/C  ABDOMEN:  Soft, nontender, nondistended with good bowel sounds heard  EXTREMITIES:  Without cyanosis, clubbing or edema.   NEUROLOGICAL:  Gross nonfocal       CBC Full  -  ( 03 Sep 2022 22:27 )  WBC Count : 13.00 K/uL  RBC Count : 4.08 M/uL  Hemoglobin : 11.9 g/dL  Hematocrit : 36.0 %  Platelet Count - Automated : 178 K/uL  Mean Cell Volume : 88.2 fl  Mean Cell Hemoglobin : 29.2 pg  Mean Cell Hemoglobin Concentration : 33.1 gm/dL  Auto Neutrophil # : 11.43 K/uL  Auto Lymphocyte # : 0.62 K/uL  Auto Monocyte # : 0.79 K/uL  Auto Eosinophil # : 0.07 K/uL  Auto Basophil # : 0.04 K/uL  Auto Neutrophil % : 87.9 %  Auto Lymphocyte % : 4.8 %  Auto Monocyte % : 6.1 %  Auto Eosinophil % : 0.5 %  Auto Basophil % : 0.3 %    PT/INR - ( 03 Sep 2022 22:27 )   PT: 13.4 sec;   INR: 1.15 ratio         PTT - ( 03 Sep 2022 22:27 )  PTT:30.0 sec  Urinalysis Basic - ( 04 Sep 2022 08:30 )    Color: Yellow / Appearance: Clear / S.015 / pH: x  Gluc: x / Ketone: Negative  / Bili: Negative / Urobili: Negative   Blood: x / Protein: 100 / Nitrite: Negative   Leuk Esterase: Moderate / RBC: x / WBC x   Sq Epi: x / Non Sq Epi: x / Bacteria: x          137  |  104  |  70<H>  ----------------------------<  191<H>  4.6   |  25  |  2.85<H>    Ca    8.3<L>      03 Sep 2022 22:27    TPro  7.3  /  Alb  3.2<L>  /  TBili  0.6  /  DBili  x   /  AST  30  /  ALT  18  /  AlkPhos  90  -    72 year old male w hx polio, nonambulatory, hx DM II (states none since 100 lb weight loss  being admitted w     #Acute L hip fracture  - patient is moderate risk of brooke /  post surgical complications for low risk surgical intervention  - patient is anticipated to go to OR today    #elevated blood glucose 191  #DM   - ISS    #CKD w baseline GFR 30 likely CKD 3b  - IV hydration  - UA not suggestive of active infection  - patient had renal problems over 10 years now -  sees nephrology in [place]    # Leukocytosis in the setting of abnormal UA  - start IV hydration  - IV rocephin  - pending urine cultures    #Elevated BP - this is most likely secondary to acute hip fracture /  pain  - started on Norvasc / BB  - IV hydralazine PRN  - no longer on meds after weight loss meds  - initial elevation due to pain initially in ED      #EKG NSR w increased QT  1. Mg in AM  2. monitor use of zofran or other meds that interfere w QT prolongation      #Lymphedema  chronic     #Polio as a child  #Nonambulatory but was able to stand and able to transfer into wheelchair independently      #VTE  ordered for post op as lovenox 30 mg given CKD) until weight adjusted for optimal dosing              Reason for Admission: L hip fracture  History of Present Illness:   72 year old male w hx polio, nonambulatory who uses wheelchair independently, CKD came to ED by EMS c/o L hip/leg pain      Non-ambulatory, but uses wheelchair and transfers independently   Slipped on the step in hot tub and now w c/o severe L hip pain after feeling his leg twist  Remained in hot tub for 6 hrs because he didn't want to disrupt grandson's birthday.     Denied pain elsewhere    Pt does not take any medicine after losing 100 lbs      In ED /72   HR 68   RR$ 18   T 97.9    97% sat RA  CXR no acute infiltrate  xrays + +L hip  fx    Patient notes of extensive past surgical history -  no complications with anesthesia No chest pain or SOB with exertion. EKG shows no acute isachemic changes. XRAY shows no infiltrates.   - Given patient's age and advanced medical history  - pt is moderate risk of brooke /  post surgical complications for low risk surgical intervention. Patient's Blood pressures currently 160 /  67, ok to proceed with surgery.        REVIEW OF SYSTEMS:  General: NAD, hemodynamically stable  HEENT:  Eyes:  No visual loss, blurred vision, double vision or yellow sclerae. Ears, Nose, Throat:  No hearing loss, sneezing, congestion, runny nose or sore throat.  SKIN:  No rash or itching.  CARDIOVASCULAR:  No chest pain, chest pressure or chest discomfort. No palpitations or edema.  RESPIRATORY:  No shortness of breath, cough or sputum.  GASTROINTESTINAL:  No anorexia, nausea, vomiting or diarrhea. No abdominal pain or blood.  NEUROLOGICAL:  No headache, dizziness, syncope, paralysis, ataxia, numbness or tingling in the extremities. No change in bowel or bladder control.  MUSCULOSKELETAL:  No muscle, back pain, joint pain or stiffness.  HEMATOLOGIC:  No anemia, bleeding or bruising.  LYMPHATICS:  No enlarged nodes. No history of splenectomy.  ENDOCRINOLOGIC:  No reports of sweating, cold or heat intolerance. No polyuria or polydipsia.  ALLERGIES:  No history of asthma, hives, eczema or rhinitis.    Physical Exam:   GENERAL APPEARANCE:  NAD, hemodynamically stable  T(C): 36.7 (22 @ 00:45), Max: 36.7 (22 @ 00:45)  HR: 84 (22 @ 00:45) (68 - 84)  BP: 163/76 (22 @ 00:45) (163/76 - 199/71)  RR: 18 (22 @ 00:45) (18 - 18)  SpO2: 99% (22 @ 00:45) (97% - 99%)  Wt(kg): --  HEENT:  Head is normocephalic    Skin:  Warm and dry without any rash   NECK:  Supple without lymphadenopathy.   HEART:  Regular rate and rhythm. normal S1 and S2, No M/R/G  LUNGS:  Good ins/exp effort, no W/R/R/C  ABDOMEN:  Soft, nontender, nondistended with good bowel sounds heard  EXTREMITIES:  Without cyanosis, clubbing or edema.   NEUROLOGICAL:  Gross nonfocal       CBC Full  -  ( 03 Sep 2022 22:27 )  WBC Count : 13.00 K/uL  RBC Count : 4.08 M/uL  Hemoglobin : 11.9 g/dL  Hematocrit : 36.0 %  Platelet Count - Automated : 178 K/uL  Mean Cell Volume : 88.2 fl  Mean Cell Hemoglobin : 29.2 pg  Mean Cell Hemoglobin Concentration : 33.1 gm/dL  Auto Neutrophil # : 11.43 K/uL  Auto Lymphocyte # : 0.62 K/uL  Auto Monocyte # : 0.79 K/uL  Auto Eosinophil # : 0.07 K/uL  Auto Basophil # : 0.04 K/uL  Auto Neutrophil % : 87.9 %  Auto Lymphocyte % : 4.8 %  Auto Monocyte % : 6.1 %  Auto Eosinophil % : 0.5 %  Auto Basophil % : 0.3 %    PT/INR - ( 03 Sep 2022 22:27 )   PT: 13.4 sec;   INR: 1.15 ratio         PTT - ( 03 Sep 2022 22:27 )  PTT:30.0 sec  Urinalysis Basic - ( 04 Sep 2022 08:30 )    Color: Yellow / Appearance: Clear / S.015 / pH: x  Gluc: x / Ketone: Negative  / Bili: Negative / Urobili: Negative   Blood: x / Protein: 100 / Nitrite: Negative   Leuk Esterase: Moderate / RBC: x / WBC x   Sq Epi: x / Non Sq Epi: x / Bacteria: x          137  |  104  |  70<H>  ----------------------------<  191<H>  4.6   |  25  |  2.85<H>    Ca    8.3<L>      03 Sep 2022 22:27    TPro  7.3  /  Alb  3.2<L>  /  TBili  0.6  /  DBili  x   /  AST  30  /  ALT  18  /  AlkPhos  90  09-03    72 year old male w hx polio, nonambulatory, hx DM II (states none since 100 lb weight loss  being admitted w     #Acute L hip fracture  - patient is moderate risk of brooke /  post surgical complications for low risk surgical intervention  - patient is anticipated to go to OR today    #elevated blood glucose 191  #DM   - ISS    #CKD w baseline GFR 30 likely CKD 3b  - IV hydration  - UA not suggestive of active infection  - patient had renal problems over 10 years now -  sees nephrology in [place]    # Leukocytosis in the setting of abnormal UA  - start IV hydration  - IV rocephin  - pending urine cultures    # Hypertensive urgency - this is most likely secondary to acute hip fracture /  pain /  holding some Blood pressure meds  - started on Norvasc / BB  - IV hydralazine PRN  - current  /  66  - initial elevation due to pain i     #EKG NSR w increased QT  1. Mg in AM  2. monitor use of zofran or other meds that interfere w QT prolongation      #Lymphedema  chronic     #Polio as a child  #Nonambulatory but was able to stand and able to transfer into wheelchair independently      #VTE  ordered for post op as lovenox 30 mg given CKD) until weight adjusted for optimal dosing

## 2022-09-04 NOTE — PROGRESS NOTE ADULT - SUBJECTIVE AND OBJECTIVE BOX
Orthopedics     POD 0 s/p L Hip IM Nail for subtroch fracture  Pain is controlled. Pt feeling well. No nausea or vomiting.     Vital Signs Last 24 Hrs  T(C): 37 (09-04-22 @ 16:27), Max: 37 (09-04-22 @ 16:27)  T(F): 98.6 (09-04-22 @ 16:27), Max: 98.6 (09-04-22 @ 16:27)  HR: 81 (09-04-22 @ 16:27) (68 - 84)  BP: 148/71 (09-04-22 @ 16:27) (134/74 - 199/71)  BP(mean): 99 (09-04-22 @ 00:45) (99 - 103)  RR: 18 (09-04-22 @ 16:27) (8 - 18)  SpO2: 98% (09-04-22 @ 16:27) (95% - 99%)                        10.7   14.42 )-----------( 183      ( 04 Sep 2022 15:27 )             32.6     04 Sep 2022 15:27    139    |  108    |  69     ----------------------------<  180    4.0     |  24     |  2.83     Ca    7.9        04 Sep 2022 15:27  Phos  4.8       04 Sep 2022 08:36  Mg     2.3       04 Sep 2022 08:36    TPro  x      /  Alb  3.0    /  TBili  x      /  DBili  x      /  AST  x      /  ALT  x      /  AlkPhos  x      04 Sep 2022 08:36    PT/INR - ( 04 Sep 2022 08:36 )   PT: 13.0 sec;   INR: 1.12 ratio         PTT - ( 04 Sep 2022 08:36 )  PTT:30.9 sec    Exam:  NAD AAOx3    LLE  Dressing clean and dry  +EHL FHL TA GS  SILT toes 1-5  +DP  Calf Soft NT  SCDs in place    A/P:  72M POD 0, stable, s/p L Hip IM Nail for subtroch fracture  -Analgesia  -DVT PE ppx, per AC team, to start tomorrow  -OOB PT   -ICE over Left hip  -prophylactic abx x24hrs  -WBAT  -Incentive spirometry  -Appreciate medical management

## 2022-09-04 NOTE — H&P ADULT - RESPIRATORY
clear to auscultation bilaterally/no respiratory distress/no use of accessory muscles/diminished breath sounds, L/diminished breath sounds, R

## 2022-09-04 NOTE — H&P ADULT - ASSESSMENT
72 year old male w hx polio, nonambulatory, hx DM II (states none since 100 lb weight loss  being admitted w   #Acute L hip fracture  1. admit to med  2. bedrest  3. pain meds ordered by ortho  4. NPO for ORIF today  5. ice        #elevated blood glucose 191  #DM hx  1. NPO  2. BGM  3. ISS low  4. Hb A1c        #CKD w baseline GFR 30 likely CKD 3b  1. NS 75 cc/hr   2. trend Cr, P  3. needs UA checked  4. monitor BP  5. I/O      #Elevated BP   no longer on meds after weight loss meds  initial elevation due to pain initially in ED  #Hx HTN  1. monitor BP   2. Hydralazine 10 mg IV q 8 hrs prn w parameters      #EKG NSR w increased QT  1. Mg in AM  2. monitor use of zofran or other meds that interfere w QT prolongation      #Lymphedema  chronic   #Polio as a child  #Nonambulatory but was able to stand and able to transfer into wheelchair independently      #VTE  ordered for post op as lovenox 30 mg given CKD) until weight adjusted for optimal dosing      Prior to OR for L hip fracture, pt needs routine UA to make sure no infection; will then need ABx started   HTN and DM will be managed according to current orders until post op   72 year old male w hx polio, nonambulatory, hx DM II (states none since 100 lb weight loss  being admitted w   #Acute L hip fracture  1. admit to med  2. bedrest  3. pain meds ordered by ortho  4. NPO for ORIF today  5. ice        #elevated blood glucose 191  #DM   1. NPO  2. BGM  3. ISS low  4. Hb A1c        #CKD w baseline GFR 30 likely CKD 3b  1. NS 75 cc/hr   2. trend Cr, P  3. needs UA checked  4. monitor BP  5. CPK in AM (was sitting in hot tub x 6 hrs)  5. I/O      #Elevated BP   no longer on meds after weight loss meds  initial elevation due to pain initially in ED  #Hx HTN  1. monitor BP   2. Hydralazine 10 mg IV q 8 hrs prn w parameters      #EKG NSR w increased QT  1. Mg in AM  2. monitor use of zofran or other meds that interfere w QT prolongation      #Lymphedema  chronic   #Polio as a child  #Nonambulatory but was able to stand and able to transfer into wheelchair independently      #VTE  ordered for post op as lovenox 30 mg given CKD) until weight adjusted for optimal dosing      Prior to OR for L hip fracture, pt needs routine UA to make sure no infection; will then need ABx started   HTN and DM will be managed according to current orders until post op    discussed w ORTHO

## 2022-09-04 NOTE — BRIEF OPERATIVE NOTE - NSICDXBRIEFPROCEDURE_GEN_ALL_CORE_FT
PROCEDURES:  Intramedullary nailing of left femur 04-Sep-2022 15:52:02 Left subtrochanteric fracture Ryan Pierson

## 2022-09-04 NOTE — DISCHARGE NOTE PROVIDER - NSDCACTIVITY_GEN_ALL_CORE
Sex allowed/Walking - Indoors allowed/Walking - Outdoors allowed/Follow Instructions Provided by your Surgical Team No restrictions/Walking - Indoors allowed/Walking - Outdoors allowed/Follow Instructions Provided by your Surgical Team

## 2022-09-04 NOTE — DISCHARGE NOTE PROVIDER - NSDCCPTREATMENT_GEN_ALL_CORE_FT
PRINCIPAL PROCEDURE  Procedure: Intramedullary nailing of left femur  Findings and Treatment: with cerclage wires

## 2022-09-04 NOTE — DISCHARGE NOTE PROVIDER - PROVIDER TOKENS
PROVIDER:[TOKEN:[[id number]]] PROVIDER:[TOKEN:[[id number]]],PROVIDER:[TOKEN:[[id number]]] PROVIDER:[TOKEN:[[id number]],FOLLOWUP:[1 month]],PROVIDER:[TOKEN:[[id number]],FOLLOWUP:[1 month]],FREE:[LAST:[DR Burton],PHONE:[(   )    -],FAX:[(   )    -],FOLLOWUP:[2 weeks]]

## 2022-09-04 NOTE — H&P ADULT - GENITOURINARY COMMENTS
problems w urination in [place] removed his catheter (after shoulder surgery)  CKD w GFR 30 not examined

## 2022-09-04 NOTE — DISCHARGE NOTE PROVIDER - CARE PROVIDER_API CALL
Phillip Nunez)  Orthopaedic Surgery  [address] [place] [address] [place] [address]  Phone: [phone]  Fax: [phone]  Follow Up Time:    Phillip Nunez)  Orthopaedic Surgery  [address] [facility], [place] [address]  Phone: [phone]  Fax: [phone]  Follow Up Time:     Jose David Lima  INTERNAL MEDICINE  [address] [place] [address]  Phone: [phone]  Fax: [phone]  Follow Up Time:    Phillip Nunez)  Orthopaedic Surgery  [address] [facility], [place] [address]  Phone: [phone]  Fax: [phone]  Follow Up Time: 1 month    Jose David Lima  INTERNAL MEDICINE  [address] [place] [address]  Phone: [phone]  Fax: [phone]  Follow Up Time: 1 month    DR Burton,   Phone: (   )    -  Fax: (   )    -  Follow Up Time: 2 weeks

## 2022-09-04 NOTE — PATIENT PROFILE ADULT - FALL HARM RISK - HARM RISK INTERVENTIONS

## 2022-09-04 NOTE — DISCHARGE NOTE PROVIDER - CARE PROVIDERS DIRECT ADDRESSES
,DirectAddress_Unknown ,DirectAddress_Unknown,[email or web address] ,DirectAddress_Unknown,[email or web address],DirectAddress_Unknown

## 2022-09-04 NOTE — DISCHARGE NOTE PROVIDER - NSDCFUADDINST_GEN_ALL_CORE_FT
ORIF DC Instructions:    1.	Analgesia  2.	Weight Bearing as tolerated on left lower Extremity, with assistive device/rolling walker as needed  3.	Continue DVT/PE Prophylaxis per anticoagulation team.  4.	PT daily  5.	Follow up with Orthopedic Surgeon Dr. Nunez in 10-14 Days after Discharge from the Hospital. Call Office For Appointment.  6.	Staples/Sutures to be removed Post-Op Day 14, and repeat x-rays in office.  7.	Elevate the extremity as much as possible  8.	Keep dressing Clean and dry. You may shower but do not soak in water.          Discharge Instructions for Left Hip IMN:    1. PAIN CONTROL: See Med Rec.  2. ACTIVITY: Weight Bearing as Tolerated with assistance and rolling walker  3. PT: daily  4. DVT/PE PROPHYLAXIS: Continue DVT/PE Prophylaxis. See Med Rec for Duration and dose.  5. BANDAGE: Change dressing to a new Mepilex Ag bandage POD7 (9/11/22). May change sooner if dressing saturated or falling off. DO NOT REMOVE BANDAGE TO CHECK WOUND ON INTAKE.  6. STAPLES: RN Remove Staples POD14 (9/18/22).  7. SHOWER: Okay to shower. Do not soak, submerge or let shower stream beat on dressing/wound.  8. FOLLOW UP: Follow-up with Dr. Nunez in 1 month. Call office for appointment. Please perform portable x-rays of left hip and femur at Rehab POD 14 (9/18/22) before follow up.

## 2022-09-04 NOTE — PROVIDER CONTACT NOTE (MEDICATION) - SITUATION
Pt returned from PACU with hold session orders for two doses of IV Ancef and patient is currently on IV Rocephin.

## 2022-09-04 NOTE — DISCHARGE NOTE PROVIDER - HOSPITAL COURSE
The patient is a 72 year old M status post intramedullary nail for a left intertrochanteric femur Fracture after being admitted through [facility] Emergency Room. The Patient was medically Optimized for the Previously mentioned surgical procedure. The patient was taken to the operating room on date mentioned above. Prophylactic antibiotics were started before the procedure and continued for 24 hours.  There were no complications during the procedure and patient tolerated the procedure well.  The patient was transferred to recovery room in stable condition and subsequently to surgical floor.  Patient was placed on ************* for anticoagulation.  All home medications were continued.  The patient received physical therapy daily and daily labs were followed.  The dressing was kept clean, dry, intact.  *The rest of the hospital stay was unremarkable Orthopedic Summary  H&P:  Pt is a 72y Male   PAST MEDICAL & SURGICAL HISTORY:  Polio      Diabetes      Hypertension      No significant past surgical history            Now s/p Left Hip IM Nail for fracture. Pt is afebrile with stable vital signs. Pain is controlled. Exam reveals intact EHL FHL TA GS, +DP. Dressing is clean and dry.    Hospital Course:  Patient presented to [facility] ED after a fall, found to have a hip fracture, and admitted to the Medical Service. Pt was medically cleared prior to surgery. Prophylactic antibiotics were started before the procedure and continued for 24 hours. They were admitted after surgery to the orthopedic floor.  There were no orthopedic complications during the hospital stay. All home medications were continued.    Routine consults were obtained from the Anticoagulation Team for DVT/PE prophylaxis, from Physical Therapy, and followed by Medicine for Co-management. Patient was placed on  anticoagulation.  Pertinent home medications were continued.  Daily labs were followed.      On POD 0 there were no major issues. Pt received PT daily and was Discharged once cleared per Medicine.  The orthopedic Attending is aware and agrees. See addendum to DC summary per medical team below for any additional info or if any changes.   Pt is a 72y Male   Now s/p Left Hip IM Nail for fracture. Pt is afebrile with stable vital signs. Pain is controlled. Exam reveals intact EHL FHL TA GS, +DP. Dressing is clean and dry.    Hospital Course:  Patient presented to [facility] ED after a fall, found to have a hip fracture, and admitted to the Medical Service. Pt was medically cleared prior to surgery. Prophylactic antibiotics were started before the procedure and continued for 24 hours. They were admitted after surgery to the orthopedic floor.  There were no orthopedic complications during the hospital stay. All home medications were continued.    Routine consults were obtained from the Anticoagulation Team for DVT/PE prophylaxis, from Physical Therapy, and followed by Medicine for Co-management. Patient was placed on  anticoagulation.  Pertinent home medications were continued.  Daily labs were followed.         Pt is a 72y Male   Now s/p Left Hip IM Nail for fracture. Pt is afebrile with stable vital signs. Pain is controlled. Exam reveals intact EHL FHL TA GS, +DP. Dressing is clean and dry.    Patient presented to [facility] ED after a fall, found to have a hip fracture, and admitted to the Medical Service. Pt was medically cleared prior to surgery. Prophylactic antibiotics were started before the procedure and continued for 24 hours. They were admitted after surgery to the orthopedic floor.  There were no orthopedic complications during the hospital stay. All home medications were continued.    Routine consults were obtained from the Anticoagulation Team for DVT/PE prophylaxis, from Physical Therapy, and followed by Medicine for Co-management. Patient was placed on  anticoagulation.  Pertinent home medications were continued.  Daily labs were followed.      Physical Exam:   GENERAL APPEARANCE:  NAD, hemodynamically stable  T(C): 36.7 (09-06-22 @ 09:10), Max: 36.7 (09-06-22 @ 09:10)  HR: 78 (09-06-22 @ 09:10) (71 - 78)  BP: 159/71 (09-06-22 @ 09:10) (129/63 - 159/71)  RR: 18 (09-06-22 @ 09:10) (18 - 18)  SpO2: 97% (09-06-22 @ 09:10) (93% - 97%)  Wt(kg): --  HEENT:  Head is normocephalic    Skin:  Warm and dry without any rash   NECK:  Supple without lymphadenopathy.   HEART:  Regular rate and rhythm. normal S1 and S2, No M/R/G  LUNGS:  Good ins/exp effort, no W/R/R/C  ABDOMEN:  Soft, nontender, nondistended with good bowel sounds heard  EXTREMITIES:  Without cyanosis, clubbing or edema.   NEUROLOGICAL:  Gross nonfocal    72 year old male w hx polio,  multiple ortho Sx,  CKD, HTN not on meds presented to ED by EMS c/o L hip/leg pain. Slipped on the step in hot tub and now w severe L hip pain after feeling his leg twist. Remained in hot tub for 6 hrs because he didn't want to disrupt grandson's birthday. Pt is non-ambulatory at baseline,  but uses wheelchair and transfers independently .  In ED /72   HR 68   RR 18   T 97.9    97% sat RA  CXR no acute infiltrate  xrays + +L hip  fx   Now Pt is s/p Left Hip IM Nail for fracture. Pt is afebrile.  Pain is controlled. Exam reveals intact EHL FHL TA GS, +DP. Dressing is clean and dry.  Pt was evaluated by A/c team,  plan for ECASA 325mg BID x 28days   Today Pt reports no complains, feels fine, has  his  "usual pain". BP discussed, reports that doesn't check his BP at home. Meds and outPt f/u discussed. Pt declined NYDIA, reports that is independent     Vital Signs Last 24 Hrs  T(C): 36.6 (08 Sep 2022 08:38), Max: 36.7 (07 Sep 2022 20:58)  T(F): 97.9 (08 Sep 2022 08:38), Max: 98.1 (07 Sep 2022 20:58)  HR: 80 (08 Sep 2022 10:36) (76 - 80)  BP: 165/77 (08 Sep 2022 10:36) (165/77 - 184/80)  RR: 18 (08 Sep 2022 08:38) (18 - 19)  SpO2: 97% (08 Sep 2022 08:38) (97% - 99%)      PHYSICAL EXAM:  General: Well developed;   in no acute distress  Eyes: EOMI; conjunctiva and sclera clear  Head: Normocephalic; atraumatic  ENMT: No nasal discharge; airway clear  Neck: Supple; non tender; no masses  Respiratory:  Decreased BS at bases, Mild wheezes LLL, resolved with deep breathing   Cardiovascular: Regular rate and rhythm. S1 and S2 Normal;   Gastrointestinal: Soft non-tender non-distended; Normal bowel sounds  Genitourinary: No  suprapubic  tenderness  Extremities: Nor edema, LE muscle atrophy , L thigh dressing D/C/I   Vascular: Peripheral pulses palpable 2+ bilaterally  Neurological: Alert and oriented x3, paraplegic   Skin: Warm and dry. No acute rash  Lymph Nodes: No acute cervical adenopathy  Psychiatric: Cooperative and appropriate      72 year old male w hx polio, nonambulatory, hx DM II (states none since 100 lb weight loss)  admitted w     # (L) sided Hip fracture  # S/p  s/p L Hip IM Nail for subtroch fracture , stable,   - ICE over Left hip PRN   - s/p  prophylactic abx x24hrs  - Incentive spirometry  - pain management  - ASA 326mf PO BID x 28days     # Hypertensive urgency. BP better    - c/w  Norvasc, dose increased to 10mg PPO QD  - Add Losartan 25mg PO QD       # CKD  3b  - baseline Scr likely around 2.8- 3  - Overall improved   - UA not suggestive of active infection  - patient had renal problems for  over 10 years now  -  sees UROLOGY in [place], no nephrology  - declined work up   - Dr. Jose David guardado appreciated, Pt was warned that may end up on HD     # Anemia most likely combination of acute blood loss anemia secondary to recent surgery and  Chronic Dz anemia related to CKD   - H/H stable     # Leukocytosis  likely reactive , resolved   - was initially started on empiric abxs but stopped  - No UCX sent   - No symptoms of UTI     # Polio as a child  #Nonambulatory but was able to stand and able to transfer into wheelchair independently    # VTE  on heparin SQ, will c/w  BID       Dispo; stable for d/c home with HC today when transport and HC arranged

## 2022-09-04 NOTE — DISCHARGE NOTE PROVIDER - NSDCMRMEDTOKEN_GEN_ALL_CORE_FT
amLODIPine 10 mg oral tablet: 1 tab(s) orally once a day  Aspirin Enteric Coated 325 mg oral delayed release tablet: 1 tab(s) orally 2 times a day MDD:750mg take as directed by anticoagulation services last dose on   losartan 25 mg oral tablet: 1 tab(s) orally once a day  oxyCODONE 5 mg oral tablet: 1 tab(s) orally every 6 hours, As Needed -Severe Pain (7 - 10) - for moderate pain MDD:20mg

## 2022-09-04 NOTE — DISCHARGE NOTE PROVIDER - NSDCHHNEEDSERVICE_GEN_ALL_CORE
Rehabilitation services/Teaching and training/Wound care and assessment Medication teaching and assessment/Observation and assessment/Rehabilitation services/Teaching and training/Wound care and assessment

## 2022-09-04 NOTE — H&P ADULT - NSHPLABSRESULTS_GEN_ALL_CORE
CXR no acute infiltrate  xrays + L hip fx      EKG NSR increased QT occas PVC; no dynamic ST-T changes

## 2022-09-04 NOTE — DISCHARGE NOTE PROVIDER - NSDCCPCAREPLAN_GEN_ALL_CORE_FT
PRINCIPAL DISCHARGE DIAGNOSIS  Diagnosis: Fracture, femur, subtrochanteric  Assessment and Plan of Treatment: left       PRINCIPAL DISCHARGE DIAGNOSIS  Diagnosis: Fracture, femur, subtrochanteric  Assessment and Plan of Treatment: - POD 2, stable, s/p L Hip IM Nail for subtroch fracture  - follow up with Dr. Nunez        SECONDARY DISCHARGE DIAGNOSES  Diagnosis: Acute on chronic renal failure  Assessment and Plan of Treatment: - discharge Scr 3.28  - close follow up with kidney function    Diagnosis: Anemia of chronic disease  Assessment and Plan of Treatment: - no signs of active bleeding     PRINCIPAL DISCHARGE DIAGNOSIS  Diagnosis: Fracture, femur, subtrochanteric  Assessment and Plan of Treatment: - C/w pain meds  - PT daily   - follow up with Dr. Nunez in 1 month         SECONDARY DISCHARGE DIAGNOSES  Diagnosis: Anemia of chronic disease  Assessment and Plan of Treatment: - no signs of active bleeding    Diagnosis: Stage 3 chronic kidney disease  Assessment and Plan of Treatment: Cr at baseline around 3   Oral hydration   No Motrin, aleve, advil or Ibuprofen   F/u with Dr kapoor    Diagnosis: HTN (hypertension)  Assessment and Plan of Treatment: C/w Amlodipine and Norvasc  F/u with PCP
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
## 2022-09-05 LAB
A1C WITH ESTIMATED AVERAGE GLUCOSE RESULT: 5.9 % — HIGH (ref 4–5.6)
ANION GAP SERPL CALC-SCNC: 7 MMOL/L — SIGNIFICANT CHANGE UP (ref 5–17)
BUN SERPL-MCNC: 71 MG/DL — HIGH (ref 7–23)
CALCIUM SERPL-MCNC: 7.7 MG/DL — LOW (ref 8.5–10.1)
CHLORIDE SERPL-SCNC: 107 MMOL/L — SIGNIFICANT CHANGE UP (ref 96–108)
CO2 SERPL-SCNC: 24 MMOL/L — SIGNIFICANT CHANGE UP (ref 22–31)
CREAT SERPL-MCNC: 3.17 MG/DL — HIGH (ref 0.5–1.3)
EGFR: 20 ML/MIN/1.73M2 — LOW
ESTIMATED AVERAGE GLUCOSE: 123 MG/DL — HIGH (ref 68–114)
GLUCOSE SERPL-MCNC: 157 MG/DL — HIGH (ref 70–99)
HCT VFR BLD CALC: 28.5 % — LOW (ref 39–50)
HGB BLD-MCNC: 9.4 G/DL — LOW (ref 13–17)
MCHC RBC-ENTMCNC: 29.5 PG — SIGNIFICANT CHANGE UP (ref 27–34)
MCHC RBC-ENTMCNC: 33 GM/DL — SIGNIFICANT CHANGE UP (ref 32–36)
MCV RBC AUTO: 89.3 FL — SIGNIFICANT CHANGE UP (ref 80–100)
PLATELET # BLD AUTO: 162 K/UL — SIGNIFICANT CHANGE UP (ref 150–400)
POTASSIUM SERPL-MCNC: 4.2 MMOL/L — SIGNIFICANT CHANGE UP (ref 3.5–5.3)
POTASSIUM SERPL-SCNC: 4.2 MMOL/L — SIGNIFICANT CHANGE UP (ref 3.5–5.3)
RBC # BLD: 3.19 M/UL — LOW (ref 4.2–5.8)
RBC # FLD: 13.3 % — SIGNIFICANT CHANGE UP (ref 10.3–14.5)
SODIUM SERPL-SCNC: 138 MMOL/L — SIGNIFICANT CHANGE UP (ref 135–145)
WBC # BLD: 9.84 K/UL — SIGNIFICANT CHANGE UP (ref 3.8–10.5)
WBC # FLD AUTO: 9.84 K/UL — SIGNIFICANT CHANGE UP (ref 3.8–10.5)

## 2022-09-05 PROCEDURE — 99233 SBSQ HOSP IP/OBS HIGH 50: CPT

## 2022-09-05 PROCEDURE — 99223 1ST HOSP IP/OBS HIGH 75: CPT

## 2022-09-05 PROCEDURE — 93010 ELECTROCARDIOGRAM REPORT: CPT

## 2022-09-05 RX ORDER — ENOXAPARIN SODIUM 100 MG/ML
40 INJECTION SUBCUTANEOUS EVERY 24 HOURS
Refills: 0 | Status: DISCONTINUED | OUTPATIENT
Start: 2022-09-05 | End: 2022-09-05

## 2022-09-05 RX ORDER — HEPARIN SODIUM 5000 [USP'U]/ML
5000 INJECTION INTRAVENOUS; SUBCUTANEOUS EVERY 12 HOURS
Refills: 0 | Status: DISCONTINUED | OUTPATIENT
Start: 2022-09-05 | End: 2022-09-09

## 2022-09-05 RX ORDER — ENOXAPARIN SODIUM 100 MG/ML
30 INJECTION SUBCUTANEOUS EVERY 24 HOURS
Refills: 0 | Status: DISCONTINUED | OUTPATIENT
Start: 2022-09-05 | End: 2022-09-05

## 2022-09-05 RX ADMIN — Medication 975 MILLIGRAM(S): at 05:26

## 2022-09-05 RX ADMIN — CEFTRIAXONE 100 MILLIGRAM(S): 500 INJECTION, POWDER, FOR SOLUTION INTRAMUSCULAR; INTRAVENOUS at 10:46

## 2022-09-05 RX ADMIN — Medication 3 MILLIGRAM(S): at 21:45

## 2022-09-05 RX ADMIN — Medication 100 MILLIGRAM(S): at 05:27

## 2022-09-05 RX ADMIN — FAMOTIDINE 20 MILLIGRAM(S): 10 INJECTION INTRAVENOUS at 09:08

## 2022-09-05 NOTE — PROGRESS NOTE ADULT - SUBJECTIVE AND OBJECTIVE BOX
Patient seen and examined at bedside.  No acute complaints at this time. Pain well controlled. Denies chest pain, shortness of breath, nausea or vomiting.     LABS:                        10.7   14.42 )-----------( 183      ( 04 Sep 2022 15:27 )             32.6         139  |  108  |  69<H>  ----------------------------<  180<H>  4.0   |  24  |  2.83<H>    Ca    7.9<L>      04 Sep 2022 15:27  Phos  4.8       Mg     2.3         TPro  x   /  Alb  3.0<L>  /  TBili  x   /  DBili  x   /  AST  x   /  ALT  x   /  AlkPhos  x       PT/INR - ( 04 Sep 2022 08:36 )   PT: 13.0 sec;   INR: 1.12 ratio         PTT - ( 04 Sep 2022 08:36 )  PTT:30.9 sec  Urinalysis Basic - ( 04 Sep 2022 08:30 )    Color: Yellow / Appearance: Clear / S.015 / pH: x  Gluc: x / Ketone: Negative  / Bili: Negative / Urobili: Negative   Blood: x / Protein: 100 / Nitrite: Negative   Leuk Esterase: Moderate / RBC: 0-2 /HPF / WBC >50   Sq Epi: x / Non Sq Epi: Few / Bacteria: Few        VITAL SIGNS:  T(C): 36.3 (22 @ 21:17), Max: 37 (22 @ 16:27)  HR: 73 (22 @ 21:17) (73 - 81)  BP: 129/64 (22 @ 21:17) (129/64 - 197/87)  RR: 18 (22 @ 21:17) (8 - 18)  SpO2: 94% (22 @ 21:17) (94% - 99%)    Exam:  NAD AAOx3    LLE  Dressing clean and dry  +EHL FHL TA GS  SILT L3-S1  +DP  NTTP b/l calves  SCDs in place    A/P:  72M POD 1, stable, s/p L Hip IM Nail for subtroch fracture  -Analgesia  -DVT PE ppx, per AC team, to start today  -OOB PT   -ICE over Left hip  -prophylactic abx x24hrs  -WBAT  -Incentive spirometry  -Appreciate medical management

## 2022-09-05 NOTE — OCCUPATIONAL THERAPY INITIAL EVALUATION ADULT - ADDITIONAL COMMENTS
Patient reports he resides in a  alone that is handicap accessible. Patient reports having a ramp to enter and 1st floor s/u. Patient states he is (I) with ADL, IADL tasks, and transfers/uses w/c (I). Patient wears glasses and is RHD. Patient has a shower stall with a built in seat +GB, and a wide base commode.
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
## 2022-09-05 NOTE — PROGRESS NOTE ADULT - SUBJECTIVE AND OBJECTIVE BOX
Reason for Admission: L hip fracture  History of Present Illness:   72 year old male w hx polio, nonambulatory who uses wheelchair independently, CKD came to ED by EMS c/o L hip/leg pain      Non-ambulatory, but uses wheelchair and transfers independently   Slipped on the step in hot tub and now w c/o severe L hip pain after feeling his leg twist  Remained in hot tub for 6 hrs because he didn't want to disrupt grandson's birthday.     Denied pain elsewhere    Pt does not take any medicine after losing 100 lbs      In ED /72   HR 68   RR$ 18   T 97.9    97% sat RA  CXR no acute infiltrate  xrays + +L hip  fx          REVIEW OF SYSTEMS:  General: NAD, hemodynamically stable  HEENT:  Eyes:  No visual loss, blurred vision, double vision or yellow sclerae. Ears, Nose, Throat:  No hearing loss, sneezing, congestion, runny nose or sore throat.  SKIN:  No rash or itching.  CARDIOVASCULAR:  No chest pain, chest pressure or chest discomfort. No palpitations or edema.  RESPIRATORY:  No shortness of breath, cough or sputum.  GASTROINTESTINAL:  No anorexia, nausea, vomiting or diarrhea. No abdominal pain or blood.  NEUROLOGICAL:  No headache, dizziness, syncope, paralysis, ataxia, numbness or tingling in the extremities. No change in bowel or bladder control.  MUSCULOSKELETAL:  No muscle, back pain, joint pain or stiffness.  HEMATOLOGIC:  No anemia, bleeding or bruising.  LYMPHATICS:  No enlarged nodes. No history of splenectomy.  ENDOCRINOLOGIC:  No reports of sweating, cold or heat intolerance. No polyuria or polydipsia.  ALLERGIES:  No history of asthma, hives, eczema or rhinitis.    Physical Exam:   GENERAL APPEARANCE:  NAD, hemodynamically stable  T(C): 36.7 (22 @ 00:45), Max: 36.7 (22 @ 00:45)  HR: 84 (22 @ 00:45) (68 - 84)  BP: 163/76 (22 @ 00:45) (163/76 - 199/71)  RR: 18 (22 @ 00:45) (18 - 18)  SpO2: 99% (22 @ 00:45) (97% - 99%)  Wt(kg): --  HEENT:  Head is normocephalic    Skin:  Warm and dry without any rash   NECK:  Supple without lymphadenopathy.   HEART:  Regular rate and rhythm. normal S1 and S2, No M/R/G  LUNGS:  Good ins/exp effort, no W/R/R/C  ABDOMEN:  Soft, nontender, nondistended with good bowel sounds heard  EXTREMITIES:  Without cyanosis, clubbing or edema.   NEUROLOGICAL:  Gross nonfocal       CBC Full  -  ( 03 Sep 2022 22:27 )  WBC Count : 13.00 K/uL  RBC Count : 4.08 M/uL  Hemoglobin : 11.9 g/dL  Hematocrit : 36.0 %  Platelet Count - Automated : 178 K/uL  Mean Cell Volume : 88.2 fl  Mean Cell Hemoglobin : 29.2 pg  Mean Cell Hemoglobin Concentration : 33.1 gm/dL  Auto Neutrophil # : 11.43 K/uL  Auto Lymphocyte # : 0.62 K/uL  Auto Monocyte # : 0.79 K/uL  Auto Eosinophil # : 0.07 K/uL  Auto Basophil # : 0.04 K/uL  Auto Neutrophil % : 87.9 %  Auto Lymphocyte % : 4.8 %  Auto Monocyte % : 6.1 %  Auto Eosinophil % : 0.5 %  Auto Basophil % : 0.3 %    PT/INR - ( 03 Sep 2022 22:27 )   PT: 13.4 sec;   INR: 1.15 ratio         PTT - ( 03 Sep 2022 22:27 )  PTT:30.0 sec  Urinalysis Basic - ( 04 Sep 2022 08:30 )    Color: Yellow / Appearance: Clear / S.015 / pH: x  Gluc: x / Ketone: Negative  / Bili: Negative / Urobili: Negative   Blood: x / Protein: 100 / Nitrite: Negative   Leuk Esterase: Moderate / RBC: x / WBC x   Sq Epi: x / Non Sq Epi: x / Bacteria: x          137  |  104  |  70<H>  ----------------------------<  191<H>  4.6   |  25  |  2.85<H>    Ca    8.3<L>      03 Sep 2022 22:27    TPro  7.3  /  Alb  3.2<L>  /  TBili  0.6  /  DBili  x   /  AST  30  /  ALT  18  /  AlkPhos  90      72 year old male w hx polio, nonambulatory, hx DM II (states none since 100 lb weight loss  being admitted w     # (L) sided Hip fracture  # POD 1, stable, s/p L Hip IM Nail for subtroch fracture  -ICE over Left hip  -prophylactic abx x24hrs  -Incentive spirometry  - pain managment    # Hypertensive urgency -  resolved  - Blood pressures currently  optimized  - started on Norvasc / BB  - BPs now at goal    # CKD w baseline GFR 30 likely CKD 3b  - UA not suggestive of active infection  - patient had renal problems over 10 years now -  sees nephrology in [place]    # Leukocytosis in the setting of abnormal UA  - IV rocephin  - pending urine cultures    #EKG NSR w increased QT  1. Mg in AM  2. monitor use of zofran or other meds that interfere w QT prolongation    #Polio as a child  #Nonambulatory but was able to stand and able to transfer into wheelchair independently      #VTE  ordered for post op as lovenox 30 mg given CKD) until weight adjusted for optimal dosing

## 2022-09-05 NOTE — OCCUPATIONAL THERAPY INITIAL EVALUATION ADULT - PERTINENT HX OF CURRENT PROBLEM, REHAB EVAL
Patient is a 71 y/o male with PMH of polio, CKD, who is non-ambulatory at baseline, (I) using w/c and with transfers. Patient presented to ED by EMS- patient slipped on the step in the hot-tub w c/o severe L hip pain after feeling his leg twist, remained in hot tub for 6 hrs because he didn't want to disrupt grandson's birthday. Patient found to have displaced oblique fracture proximal left shaft of femur, now s/p IMN subtrochanteric fx.

## 2022-09-05 NOTE — OCCUPATIONAL THERAPY INITIAL EVALUATION ADULT - GENERAL OBSERVATIONS, REHAB EVAL
Patient rec'd semi-supine in bed, NAD, +IV-locked, on bedrest orders, agreeable to answer questions- but refusing OT IE or services.

## 2022-09-05 NOTE — PHYSICAL THERAPY INITIAL EVALUATION ADULT - GENERAL OBSERVATIONS, REHAB EVAL
Pt rec'd supine in bed, pleasant and cooperative with PT, no c/o pain, but states he will only attempt OOB mobility once his dtr brings his clothes and power wheelchair. Pt states he has no functional use of his legs (polio) and is confident he will be able to transfer bed to chair consistent with his baseline.

## 2022-09-05 NOTE — CONSULT NOTE ADULT - ASSESSMENT
72 year old male w hx polio, nonambulatory who uses wheelchair independently, CKD came to ED by EMS c/o L hip/leg pain, S/P Slipped on the step in hot tub and now w c/o severe L hip pain after feeling his leg twist, Remained in hot tub for 6 hrs because he didn't want to disrupt grandson's birthday found to have  L hip fracture, S/P Left Hip IMN on 9/4/2022. Consulted by Dr.Scott Nunez    for VTE prophylaxis, risk stratification, and anticoagulation management. patient came with S.cr of 2.85 now it is 3.17 with Crcl of 33   Patient with h/o Polio non ambulatotry stated he had multiple surgeries in the past never been on any AC no h/o Vte, does not want to take lovenox Out patient,  but agreed to take Lovenox in the hospital also agreeable to take ASA out patient is aware of the risks of VTE      Plan  : S.cr 3.17  : start Heparin 5000units SQ every 12 hours  four weeks post procedure wants to go home on [facility]AS[facility]  :daily cbc/bmp  :LE Venodynes  : increase mobility as tolerated  :Thanks for consult will f/u    
72 year old male w hx polio, nonambulatory who uses wheelchair independently, CKD came to ED by EMS c/o L hip/leg pain  s/p repair of LEft hip fx 9/4   SONDRA/ckd stage 3 baseline due to post op ATN vs urinary retention   neurogenic bladder declining straight cath or mcconnell     PLAN   - fu trend of scr on po intake  - pt declining bladder scan, states he will not do mcconnell or cath if retaining       dr kapoor et al will resume care tues am

## 2022-09-05 NOTE — OCCUPATIONAL THERAPY INITIAL EVALUATION ADULT - NSACTIVITYREC_GEN_A_OT
Patient refusing OT IE at this time, states he does not want OT while he is at [place]. If patient changes his mind, to re-order OT IE to further assess/provide recommendations.

## 2022-09-05 NOTE — CONSULT NOTE ADULT - CONSULT REASON
DVT/PE prophylaxis, risk stratification and Anticoagulation Management
hip pain  called 11pm seen 12am
justin/ckd
Contraindicated

## 2022-09-05 NOTE — PHYSICAL THERAPY INITIAL EVALUATION ADULT - PERTINENT HX OF CURRENT PROBLEM, REHAB EVAL
72 year old male w hx polio, nonambulatory who uses wheelchair independently, CKD came to ED by EMS c/o L hip/leg pain.  Non-ambulatory, but uses wheelchair and transfers independently   Slipped on the step in hot tub and now w c/o severe L hip pain after feeling his leg twist  Remained in hot tub for 6 hrs because he didn't want to disrupt grandson's birthday.

## 2022-09-06 LAB
ANION GAP SERPL CALC-SCNC: 10 MMOL/L — SIGNIFICANT CHANGE UP (ref 5–17)
BUN SERPL-MCNC: 77 MG/DL — HIGH (ref 7–23)
CALCIUM SERPL-MCNC: 8.2 MG/DL — LOW (ref 8.5–10.1)
CHLORIDE SERPL-SCNC: 109 MMOL/L — HIGH (ref 96–108)
CO2 SERPL-SCNC: 21 MMOL/L — LOW (ref 22–31)
CREAT SERPL-MCNC: 3.28 MG/DL — HIGH (ref 0.5–1.3)
EGFR: 19 ML/MIN/1.73M2 — LOW
GLUCOSE SERPL-MCNC: 152 MG/DL — HIGH (ref 70–99)
HCT VFR BLD CALC: 27.6 % — LOW (ref 39–50)
HGB BLD-MCNC: 8.8 G/DL — LOW (ref 13–17)
MCHC RBC-ENTMCNC: 29.2 PG — SIGNIFICANT CHANGE UP (ref 27–34)
MCHC RBC-ENTMCNC: 31.9 GM/DL — LOW (ref 32–36)
MCV RBC AUTO: 91.7 FL — SIGNIFICANT CHANGE UP (ref 80–100)
PLATELET # BLD AUTO: 149 K/UL — LOW (ref 150–400)
POTASSIUM SERPL-MCNC: 3.7 MMOL/L — SIGNIFICANT CHANGE UP (ref 3.5–5.3)
POTASSIUM SERPL-SCNC: 3.7 MMOL/L — SIGNIFICANT CHANGE UP (ref 3.5–5.3)
RBC # BLD: 3.01 M/UL — LOW (ref 4.2–5.8)
RBC # FLD: 13.6 % — SIGNIFICANT CHANGE UP (ref 10.3–14.5)
SODIUM SERPL-SCNC: 140 MMOL/L — SIGNIFICANT CHANGE UP (ref 135–145)
WBC # BLD: 7.44 K/UL — SIGNIFICANT CHANGE UP (ref 3.8–10.5)
WBC # FLD AUTO: 7.44 K/UL — SIGNIFICANT CHANGE UP (ref 3.8–10.5)

## 2022-09-06 PROCEDURE — 99233 SBSQ HOSP IP/OBS HIGH 50: CPT

## 2022-09-06 PROCEDURE — 99231 SBSQ HOSP IP/OBS SF/LOW 25: CPT

## 2022-09-06 RX ADMIN — CEFTRIAXONE 100 MILLIGRAM(S): 500 INJECTION, POWDER, FOR SOLUTION INTRAMUSCULAR; INTRAVENOUS at 10:51

## 2022-09-06 RX ADMIN — MORPHINE SULFATE 4 MILLIGRAM(S): 50 CAPSULE, EXTENDED RELEASE ORAL at 23:00

## 2022-09-06 RX ADMIN — MORPHINE SULFATE 4 MILLIGRAM(S): 50 CAPSULE, EXTENDED RELEASE ORAL at 22:43

## 2022-09-06 RX ADMIN — Medication 3 MILLIGRAM(S): at 22:38

## 2022-09-06 NOTE — PROGRESS NOTE ADULT - SUBJECTIVE AND OBJECTIVE BOX
Patient is a 72y Male who reports no complaints as new. reports poor UOP at night         MEDICATIONS  (STANDING):  acetaminophen     Tablet .. 975 milliGRAM(s) Oral every 8 hours  dextrose 5%. 1000 milliLiter(s) (100 mL/Hr) IV Continuous <Continuous>  dextrose 5%. 1000 milliLiter(s) (50 mL/Hr) IV Continuous <Continuous>  dextrose 5%. 1000 milliLiter(s) (100 mL/Hr) IV Continuous <Continuous>  dextrose 50% Injectable 25 Gram(s) IV Push once  dextrose 50% Injectable 12.5 Gram(s) IV Push once  dextrose 50% Injectable 25 Gram(s) IV Push once  famotidine    Tablet 20 milliGRAM(s) Oral every 12 hours  glucagon  Injectable 1 milliGRAM(s) IntraMuscular once  glucagon  Injectable 1 milliGRAM(s) IntraMuscular once  heparin   Injectable 5000 Unit(s) SubCutaneous every 12 hours  insulin lispro (ADMELOG) corrective regimen sliding scale   SubCutaneous three times a day before meals  insulin lispro (ADMELOG) corrective regimen sliding scale   SubCutaneous at bedtime  lactated ringers. 1000 milliLiter(s) (75 mL/Hr) IV Continuous <Continuous>  polyethylene glycol 3350 17 Gram(s) Oral daily  senna 2 Tablet(s) Oral at bedtime    MEDICATIONS  (PRN):  bisacodyl Suppository 10 milliGRAM(s) Rectal daily PRN If no bowel movement  dextrose Oral Gel 15 Gram(s) Oral once PRN Blood Glucose LESS THAN 70 milliGRAM(s)/deciliter  magnesium hydroxide Suspension 30 milliLiter(s) Oral daily PRN Constipation  melatonin 3 milliGRAM(s) Oral at bedtime PRN Insomnia  morphine  - Injectable 4 milliGRAM(s) IV Push every 4 hours PRN Severe Pain (7 - 10)  ondansetron Injectable 4 milliGRAM(s) IV Push every 6 hours PRN Nausea and/or Vomiting  oxyCODONE    IR 2.5 milliGRAM(s) Oral every 4 hours PRN Moderate Pain (4 - 6)  oxyCODONE    IR 5 milliGRAM(s) Oral every 4 hours PRN Severe Pain (7 - 10)        T(C): , Max: 36.7 (09-06-22 @ 09:10)  T(F): , Max: 98.1 (09-06-22 @ 09:10)  HR: 78 (09-06-22 @ 09:10)  BP: 159/71 (09-06-22 @ 09:10)  BP(mean): 87 (09-05-22 @ 21:12)  RR: 18 (09-06-22 @ 09:10)  SpO2: 97% (09-06-22 @ 09:10)  Wt(kg): --    09-05 @ 07:01  -  09-06 @ 07:00  --------------------------------------------------------  IN: 0 mL / OUT: 425 mL / NET: -425 mL          PHYSICAL EXAM:    Constitutional: NAD   HEENT:  MM  dist  Cardiovascular: S1 and S2   Extremities: No peripheral edema  Neurological: A/O x 3           LABS:                        8.8    7.44  )-----------( 149      ( 06 Sep 2022 08:46 )             27.6     06 Sep 2022 08:46    140    |  109    |  77     ----------------------------<  152    3.7     |  21     |  3.28   05 Sep 2022 07:14    138    |  107    |  71     ----------------------------<  157    4.2     |  24     |  3.17   04 Sep 2022 15:27    139    |  108    |  69     ----------------------------<  180    4.0     |  24     |  2.83   04 Sep 2022 08:36    139    |  107    |  69     ----------------------------<  125    3.8     |  26     |  2.81   03 Sep 2022 22:27    137    |  104    |  70     ----------------------------<  191    4.6     |  25     |  2.85     Ca    8.2        06 Sep 2022 08:46  Ca    7.7        05 Sep 2022 07:14  Ca    7.9        04 Sep 2022 15:27  Ca    8.2        04 Sep 2022 08:36  Ca    8.3        03 Sep 2022 22:27  Phos  4.8       04 Sep 2022 08:36  Mg     2.3       04 Sep 2022 08:36    TPro  x      /  Alb  3.0    /  TBili  x      /  DBili  x      /  AST  x      /  ALT  x      /  AlkPhos  x      04 Sep 2022 08:36  TPro  7.3    /  Alb  3.2    /  TBili  0.6    /  DBili  x      /  AST  30     /  ALT  18     /  AlkPhos  90     03 Sep 2022 22:27          Urine Studies:          RADIOLOGY & ADDITIONAL STUDIES:

## 2022-09-06 NOTE — PROGRESS NOTE ADULT - SUBJECTIVE AND OBJECTIVE BOX
Patient seen and examined at bedside.  No acute complaints at this time. Pain well controlled. Denies chest pain, shortness of breath, nausea or vomiting.     Vital Signs Last 24 Hrs  T(C): 36.4 (05 Sep 2022 21:12), Max: 36.6 (05 Sep 2022 16:52)  T(F): 97.6 (05 Sep 2022 21:12), Max: 97.8 (05 Sep 2022 16:52)  HR: 73 (05 Sep 2022 21:12) (70 - 73)  BP: 141/67 (05 Sep 2022 21:12) (129/63 - 141/70)  BP(mean): 87 (05 Sep 2022 21:12) (83 - 87)  RR: 18 (05 Sep 2022 21:12) (18 - 18)  SpO2: 96% (05 Sep 2022 21:12) (93% - 96%)    Parameters below as of 05 Sep 2022 21:12  Patient On (Oxygen Delivery Method): room air        Exam:  NAD AAOx3    LLE  Dressing clean and dry  +EHL FHL TA GS  SILT L3-S1  +DP  NTTP b/l calves  SCDs in place    A/P:  72M POD 2, stable, s/p L Hip IM Nail for subtroch fracture  -Analgesia  -DVT PE ppx, per AC team  -OOB PT   -ICE over Left hip  -WBAT  -Incentive spirometry  -Appreciate medical management  -dispo planning  -ortho stable
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
## 2022-09-06 NOTE — PROGRESS NOTE ADULT - ASSESSMENT
72 year old male w hx polio, nonambulatory who uses wheelchair independently, CKD IV reported base of 3 came to ED by EMS c/o L hip/leg pain  s/p repair of LEft hip fx 9/4     CKD IV  -does not see Nephrology, info given for chronic managment  -He reports long history of chronic retention. I had a long d/c with him regarding retention and its likely progression for him to esrd if not dealt with. he states clear understanding and refuses even bladder imaging  -No nsaids, toxins  -Function trending up, bmp in AM    Urinary retention (chronic)]  -Imaging if he is agreeable  -Optimize intake, monitor/record output    d/c with RN staff, Dr Gutierres

## 2022-09-06 NOTE — PROGRESS NOTE ADULT - ASSESSMENT
72 year old male w hx polio, nonambulatory who uses wheelchair independently, CKD came to ED by EMS c/o L hip/leg pain, S/P Slipped on the step in hot tub and now w c/o severe L hip pain after feeling his leg twist, Remained in hot tub for 6 hrs because he didn't want to disrupt grandson's birthday found to have  L hip fracture, S/P Left Hip IMN on 9/4/2022. Consulted by Dr.Scott Nunez    for VTE prophylaxis, risk stratification, and anticoagulation management. patient came with S.cr of 2.85 now it is 3.17 with Crcl of 33   Patient with h/o Polio non ambulatotry stated he had multiple surgeries in the past never been on any AC no h/o Vte, does not want to take lovenox Out patient,  but agreed to take Lovenox in the hospital also agreeable to take ASA out patient is aware of the risks of VTE      Plan  : S.cr 3.17---> 3.28  : cont Heparin 5000units SQ every 12 hours  four weeks post procedure wants to go home on [facility]ASA  :daily cbc/bmp  :LE Venodynes  : increase mobility as tolerated  : will f/u  : dispo home tomorrow

## 2022-09-06 NOTE — PROGRESS NOTE ADULT - SUBJECTIVE AND OBJECTIVE BOX
Reason for Admission: L hip fracture  History of Present Illness:   72 year old male w hx polio, nonambulatory who uses wheelchair independently, CKD came to ED by EMS c/o L hip/leg pain    Non-ambulatory, but uses wheelchair and transfers independently   Slipped on the step in hot tub and now w c/o severe L hip pain after feeling his leg twist  Remained in hot tub for 6 hrs because he didn't want to disrupt grandson's birthday.         REVIEW OF SYSTEMS:  General: NAD, hemodynamically stable  HEENT:  Eyes:  No visual loss, blurred vision, double vision or yellow sclerae. Ears, Nose, Throat:  No hearing loss, sneezing, congestion, runny nose or sore throat.  SKIN:  No rash or itching.  CARDIOVASCULAR:  No chest pain, chest pressure or chest discomfort. No palpitations or edema.  RESPIRATORY:  No shortness of breath, cough or sputum.  GASTROINTESTINAL:  No anorexia, nausea, vomiting or diarrhea. No abdominal pain or blood.  NEUROLOGICAL:  No headache, dizziness, syncope, paralysis, ataxia, numbness or tingling in the extremities. No change in bowel or bladder control.  MUSCULOSKELETAL:  No muscle, back pain, joint pain or stiffness.  HEMATOLOGIC:  No anemia, bleeding or bruising.  LYMPHATICS:  No enlarged nodes. No history of splenectomy.  ENDOCRINOLOGIC:  No reports of sweating, cold or heat intolerance. No polyuria or polydipsia.  ALLERGIES:  No history of asthma, hives, eczema or rhinitis.    Physical Exam:   GENERAL APPEARANCE:  NAD, hemodynamically stable  T(C): 36.7 (22 @ 00:45), Max: 36.7 (22 @ 00:45)  HR: 84 (22 @ 00:45) (68 - 84)  BP: 163/76 (22 @ 00:45) (163/76 - 199/71)  RR: 18 (22 @ 00:45) (18 - 18)  SpO2: 99% (22 @ 00:45) (97% - 99%)  Wt(kg): --  HEENT:  Head is normocephalic    Skin:  Warm and dry without any rash   NECK:  Supple without lymphadenopathy.   HEART:  Regular rate and rhythm. normal S1 and S2, No M/R/G  LUNGS:  Good ins/exp effort, no W/R/R/C  ABDOMEN:  Soft, nontender, nondistended with good bowel sounds heard  EXTREMITIES:  Without cyanosis, clubbing or edema.   NEUROLOGICAL:  Gross nonfocal       CBC Full  -  ( 03 Sep 2022 22:27 )  WBC Count : 13.00 K/uL  RBC Count : 4.08 M/uL  Hemoglobin : 11.9 g/dL  Hematocrit : 36.0 %  Platelet Count - Automated : 178 K/uL  Mean Cell Volume : 88.2 fl  Mean Cell Hemoglobin : 29.2 pg  Mean Cell Hemoglobin Concentration : 33.1 gm/dL  Auto Neutrophil # : 11.43 K/uL  Auto Lymphocyte # : 0.62 K/uL  Auto Monocyte # : 0.79 K/uL  Auto Eosinophil # : 0.07 K/uL  Auto Basophil # : 0.04 K/uL  Auto Neutrophil % : 87.9 %  Auto Lymphocyte % : 4.8 %  Auto Monocyte % : 6.1 %  Auto Eosinophil % : 0.5 %  Auto Basophil % : 0.3 %    PT/INR - ( 03 Sep 2022 22:27 )   PT: 13.4 sec;   INR: 1.15 ratio         PTT - ( 03 Sep 2022 22:27 )  PTT:30.0 sec  Urinalysis Basic - ( 04 Sep 2022 08:30 )    Color: Yellow / Appearance: Clear / S.015 / pH: x  Gluc: x / Ketone: Negative  / Bili: Negative / Urobili: Negative   Blood: x / Protein: 100 / Nitrite: Negative   Leuk Esterase: Moderate / RBC: x / WBC x   Sq Epi: x / Non Sq Epi: x / Bacteria: x          137  |  104  |  70<H>  ----------------------------<  191<H>  4.6   |  25  |  2.85<H>    Ca    8.3<L>      03 Sep 2022 22:27    TPro  7.3  /  Alb  3.2<L>  /  TBili  0.6  /  DBili  x   /  AST  30  /  ALT  18  /  AlkPhos  90      72 year old male w hx polio, nonambulatory, hx DM II (states none since 100 lb weight loss  being admitted w     # (L) sided Hip fracture  # POD # 2, stable, s/p L Hip IM Nail for subtroch fracture  - ICE over Left hip  - prophylactic abx x24hrs  - Incentive spirometry  - pain managment    # Hypertensive urgency -  resolved  - Blood pressures currently  optimized  - started on Norvasc    - BPs now at goal    # CKD w baseline GFR 30 likely CKD 3b  - Scr -  3.28 today, monitor kidney function   - UA not suggestive of active infection  - patient had renal problems over 10 years now -  sees UROLOGY in [place], no nephrology  - Dr. Jose David Lima is following    # Anemia most likely combination of acute blood loss anemia secondary to recent surgery in combination of renal failure  - current HH 8.8 from 11, closely monitor HH    # Leukocytosis in the setting of abnormal UA  - IV Rocephin    # EKG NSR w increased QT  - Mg in AM  - monitor use of zofran or other meds that interfere w QT prolongation    # Polio as a child  #Nonambulatory but was able to stand and able to transfer into wheelchair independently    # VTE  ordered for post op as lovenox 30 mg given CKD) until weight adjusted for optimal dosing

## 2022-09-06 NOTE — PATIENT PROFILE ADULT - NSPROPTRIGHTBILLOFRIGHTS_GEN_A_NUR
[FreeTextEntry1] : \par #1. PFTs performed previously were essentially normal; repeat were also normal with only slight reduction\par #2. The patient does not appear to require chronic BD therapy at this time\par #3. SOBOE is likely related to weight or deconditioning given normal PFTs\par #4. Diet and exercise for weight loss \par #5. Continue autoCPAP to treat mild JEY with an AHI of 13.6; pt now has a new Respironics machine with good compliance\par #6. CXR to evaluate SOBOE and ? h/o nodules from 2/11/21 was clear; repeat from 8/30/22 revealed ? b/l nodules vs nipple shadows so will repeat\par #7. ENT evaluation for chronic sinusitis if desired by pt; info given to pt again\par #8. Cardiology evaluation for h/o arrhythmias\par #9. Flonase nasal spray for post nasal drip as needed; on Claritin for allergies\par #10. F/u 4 months with compliance\par #11. Replace equipment as needed; ordered 5/3/22; he has a new Respironics machine\par #12. Recommended Covid vaccines and flu vaccine; refusing both; recovered from prior Covid infection in 1/2022 and 7/2022\par #13. Reviewed risks of exposure and symptoms of Covid-19 virus, including how the virus is spread and precautions to avoid allen virus.\par \par The patient expressed understanding and agreement with the above recommendations/plan and accepts responsibility to be compliant with recommended testing, therapies, and f/u visits.\par All relevant questions and concerns were addressed. 
patient

## 2022-09-07 LAB
ANION GAP SERPL CALC-SCNC: 8 MMOL/L — SIGNIFICANT CHANGE UP (ref 5–17)
BUN SERPL-MCNC: 74 MG/DL — HIGH (ref 7–23)
CALCIUM SERPL-MCNC: 8.3 MG/DL — LOW (ref 8.5–10.1)
CHLORIDE SERPL-SCNC: 109 MMOL/L — HIGH (ref 96–108)
CO2 SERPL-SCNC: 23 MMOL/L — SIGNIFICANT CHANGE UP (ref 22–31)
CREAT SERPL-MCNC: 2.97 MG/DL — HIGH (ref 0.5–1.3)
EGFR: 22 ML/MIN/1.73M2 — LOW
GLUCOSE SERPL-MCNC: 123 MG/DL — HIGH (ref 70–99)
HCT VFR BLD CALC: 27.6 % — LOW (ref 39–50)
HGB BLD-MCNC: 9.1 G/DL — LOW (ref 13–17)
MCHC RBC-ENTMCNC: 30 PG — SIGNIFICANT CHANGE UP (ref 27–34)
MCHC RBC-ENTMCNC: 33 GM/DL — SIGNIFICANT CHANGE UP (ref 32–36)
MCV RBC AUTO: 91.1 FL — SIGNIFICANT CHANGE UP (ref 80–100)
PLATELET # BLD AUTO: 166 K/UL — SIGNIFICANT CHANGE UP (ref 150–400)
POTASSIUM SERPL-MCNC: 3.9 MMOL/L — SIGNIFICANT CHANGE UP (ref 3.5–5.3)
POTASSIUM SERPL-SCNC: 3.9 MMOL/L — SIGNIFICANT CHANGE UP (ref 3.5–5.3)
RBC # BLD: 3.03 M/UL — LOW (ref 4.2–5.8)
RBC # FLD: 13.4 % — SIGNIFICANT CHANGE UP (ref 10.3–14.5)
SODIUM SERPL-SCNC: 140 MMOL/L — SIGNIFICANT CHANGE UP (ref 135–145)
WBC # BLD: 6.07 K/UL — SIGNIFICANT CHANGE UP (ref 3.8–10.5)
WBC # FLD AUTO: 6.07 K/UL — SIGNIFICANT CHANGE UP (ref 3.8–10.5)

## 2022-09-07 PROCEDURE — 99239 HOSP IP/OBS DSCHRG MGMT >30: CPT

## 2022-09-07 PROCEDURE — 99231 SBSQ HOSP IP/OBS SF/LOW 25: CPT

## 2022-09-07 RX ORDER — AMLODIPINE BESYLATE 2.5 MG/1
5 TABLET ORAL DAILY
Refills: 0 | Status: DISCONTINUED | OUTPATIENT
Start: 2022-09-07 | End: 2022-09-09

## 2022-09-07 RX ORDER — ASPIRIN/CALCIUM CARB/MAGNESIUM 324 MG
1 TABLET ORAL
Qty: 56 | Refills: 0
Start: 2022-09-07 | End: 2022-10-04

## 2022-09-07 RX ADMIN — Medication 3 MILLIGRAM(S): at 21:59

## 2022-09-07 RX ADMIN — MORPHINE SULFATE 4 MILLIGRAM(S): 50 CAPSULE, EXTENDED RELEASE ORAL at 22:16

## 2022-09-07 RX ADMIN — MORPHINE SULFATE 4 MILLIGRAM(S): 50 CAPSULE, EXTENDED RELEASE ORAL at 21:59

## 2022-09-07 NOTE — PROGRESS NOTE ADULT - ASSESSMENT
72 year old male w hx polio, nonambulatory who uses wheelchair independently, CKD IV reported base of 3 came to ED by EMS c/o L hip/leg pain sp repair of LEft hip fx 9/4     CKD IV  -Baseline near 3.0, stable today. No renal issue dc planning  -Does not see Nephrology, info given for chronic management  -No nsaids, toxins  -Monitor creatinine    Urinary retention (chronic)]  -Outpatient Urology  -Optimize intake, monitor/record output  -He reports long history of chronic retention. I had a long d/c with him on 9/6 regarding retention and its likely progression for him to esrd if not dealt with. No intervention desired on his end understanding these risks .    dc with RN staff

## 2022-09-07 NOTE — PROGRESS NOTE ADULT - SUBJECTIVE AND OBJECTIVE BOX
Patient seen and examined at bedside. Pain well controlled with medication. Patient reports that he has not worked with PT because he "knows his body best". Patient denies any new numbness, tingling, weakness, or any other orthopaedic complaint. Denies N/V/CP/SOB.       VITAL SIGNS:  T(C): 36.6 (09-07-22 @ 08:33), Max: 36.8 (09-06-22 @ 23:00)  HR: 72 (09-07-22 @ 08:33) (72 - 83)  BP: 146/71 (09-06-22 @ 23:00) (146/71 - 163/66)  RR: 18 (09-07-22 @ 08:33) (18 - 18)  SpO2: 96% (09-07-22 @ 08:33) (96% - 98%)      LABS:                        8.8    7.44  )-----------( 149      ( 06 Sep 2022 08:46 )             27.6     09-06    140  |  109<H>  |  77<H>  ----------------------------<  152<H>  3.7   |  21<L>  |  3.28<H>    Ca    8.2<L>      06 Sep 2022 08:46            Exam:  NAD AAOx3    LLE  Dressing clean and dry  +EHL FHL TA GS  SILT L3-S1  +DP  NTTP b/l calves  SCDs in place    A/P:  72M POD 3, stable, s/p L Hip IM Nail for subtroch fracture  -Analgesia  -DVT PE ppx, per AC team - heparin  -OOB PT   -ICE over Left hip  -WBAT  -Incentive spirometry  -Appreciate medical management  -dispo planning  -ortho stable

## 2022-09-07 NOTE — PROGRESS NOTE ADULT - ASSESSMENT
72 year old male w hx polio, nonambulatory who uses wheelchair independently, CKD came to ED by EMS c/o L hip/leg pain, S/P Slipped on the step in hot tub and now w c/o severe L hip pain after feeling his leg twist, Remained in hot tub for 6 hrs because he didn't want to disrupt grandson's birthday found to have  L hip fracture, S/P Left Hip IMN on 9/4/2022. Consulted by Dr.Scott Nunez    for VTE prophylaxis, risk stratification, and anticoagulation management. patient came with S.cr of 2.85 now it is 3.17 with Crcl of 33   Patient with h/o Polio non ambulatotry stated he had multiple surgeries in the past never been on any AC no h/o Vte, does not want to take lovenox Out patient,  but agreed to take Lovenox in the hospital also agreeable to take ASA out patient is aware of the risks of VTE      Plan  : S.cr 3.17---> 3.28  : cont Heparin 5000units SQ every 12 hours  four weeks post procedure wants to go home on ECASA  :daily cbc/bmp  :LE Venodynes  : increase mobility as tolerated  : will f/u  : dispo home tomorrow ?

## 2022-09-07 NOTE — PROGRESS NOTE ADULT - SUBJECTIVE AND OBJECTIVE BOX
CC: L hip fracture (07 Sep 2022 16:05)    HPI: 72 year old male w hx polio,  multiple ortho Sx,  CKD came to ED by EMS c/o L hip/leg pain  Non-ambulatory, but uses wheelchair and transfers independently   Slipped on the step in hot tub and now w severe L hip pain after feeling his leg twist  Remained in hot tub for 6 hrs because he didn't want to disrupt grandson's birthday.     In ED /71   HR 68   RR$ 18   T 97.9    97% sat RA  CXR no acute infiltrate  xrays + +L hip  fx          INTERVAL HPI/ OVERNIGHT EVENTS: Chart reviewed, Pt was seen and examined, reports some pain at site of Sx but manageable. Results and   D/c Planing discussed. Pt states that will go only home, no rehab. Pt also mentioned that has nobody at home today and would like to be dc tomorrow, feels like  needs another day     Vital Signs Last 24 Hrs  T(C): 36.7 (07 Sep 2022 20:58), Max: 36.7 (07 Sep 2022 20:58)  T(F): 98.1 (07 Sep 2022 20:58), Max: 98.1 (07 Sep 2022 20:58)  HR: 78 (07 Sep 2022 20:58) (72 - 78)  BP: 174/77 (07 Sep 2022 20:58) (163/66 - 174/77)-  RR: 19 (07 Sep 2022 20:58) (18 - 19)  SpO2: 98% (07 Sep 2022 20:58) (96% - 99%)    Parameters below as of 07 Sep 2022 20:58  Patient On (Oxygen Delivery Method): room air      REVIEW OF SYSTEMS:  All other review of systems is negative unless indicated above.      PHYSICAL EXAM:  General: Well developed;   in no acute distress  Eyes: EOMI; conjunctiva and sclera clear  Head: Normocephalic; atraumatic  ENMT: No nasal discharge; airway clear  Neck: Supple; non tender; no masses  Respiratory:  Decreased BS at bases, Mild wheezes LLL, resolved with deep breathing   Cardiovascular: Regular rate and rhythm. S1 and S2 Normal;   Gastrointestinal: Soft non-tender non-distended; Normal bowel sounds  Genitourinary: No  suprapubic  tenderness  Extremities: Nor edema, LE muscle atrophy   Vascular: Peripheral pulses palpable 2+ bilaterally  Neurological: Alert and oriented x3, paraplegic   Skin: Warm and dry. No acute rash  Lymph Nodes: No acute cervical adenopathy  Psychiatric: Cooperative and appropriate    LABS:                         9.1    6.07  )-----------( 166      ( 07 Sep 2022 08:54 )             27.6     07 Sep 2022 08:54    140    |  109    |  74     ----------------------------<  123    3.9     |  23     |  2.97     Ca    8.3        07 Sep 2022 08:54        CAPILLARY BLOOD GLUCOSE      POCT Blood Glucose.: 144 mg/dL (06 Sep 2022 23:58)          MEDICATIONS  (STANDING):  acetaminophen     Tablet .. 975 milliGRAM(s) Oral every 8 hours  dextrose 5%. 1000 milliLiter(s) (100 mL/Hr) IV Continuous <Continuous>  dextrose 5%. 1000 milliLiter(s) (50 mL/Hr) IV Continuous <Continuous>  dextrose 5%. 1000 milliLiter(s) (100 mL/Hr) IV Continuous <Continuous>  dextrose 50% Injectable 25 Gram(s) IV Push once  dextrose 50% Injectable 12.5 Gram(s) IV Push once  dextrose 50% Injectable 25 Gram(s) IV Push once  famotidine    Tablet 20 milliGRAM(s) Oral every 12 hours  glucagon  Injectable 1 milliGRAM(s) IntraMuscular once  glucagon  Injectable 1 milliGRAM(s) IntraMuscular once  heparin   Injectable 5000 Unit(s) SubCutaneous every 12 hours  insulin lispro (ADMELOG) corrective regimen sliding scale   SubCutaneous three times a day before meals  insulin lispro (ADMELOG) corrective regimen sliding scale   SubCutaneous at bedtime  lactated ringers. 1000 milliLiter(s) (75 mL/Hr) IV Continuous <Continuous>  polyethylene glycol 3350 17 Gram(s) Oral daily  senna 2 Tablet(s) Oral at bedtime    MEDICATIONS  (PRN):  bisacodyl Suppository 10 milliGRAM(s) Rectal daily PRN If no bowel movement  dextrose Oral Gel 15 Gram(s) Oral once PRN Blood Glucose LESS THAN 70 milliGRAM(s)/deciliter  magnesium hydroxide Suspension 30 milliLiter(s) Oral daily PRN Constipation  melatonin 3 milliGRAM(s) Oral at bedtime PRN Insomnia  morphine  - Injectable 4 milliGRAM(s) IV Push every 4 hours PRN Severe Pain (7 - 10)  ondansetron Injectable 4 milliGRAM(s) IV Push every 6 hours PRN Nausea and/or Vomiting  oxyCODONE    IR 2.5 milliGRAM(s) Oral every 4 hours PRN Moderate Pain (4 - 6)  oxyCODONE    IR 5 milliGRAM(s) Oral every 4 hours PRN Severe Pain (7 - 10)      RADIOLOGY & ADDITIONAL TESTS:
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
## 2022-09-07 NOTE — PROGRESS NOTE ADULT - SUBJECTIVE AND OBJECTIVE BOX
Patient is a 72y Male who reports no complaints overnight. taking po, pain stable     MEDICATIONS  (STANDING):  acetaminophen     Tablet .. 975 milliGRAM(s) Oral every 8 hours  dextrose 5%. 1000 milliLiter(s) (100 mL/Hr) IV Continuous <Continuous>  dextrose 5%. 1000 milliLiter(s) (50 mL/Hr) IV Continuous <Continuous>  dextrose 5%. 1000 milliLiter(s) (100 mL/Hr) IV Continuous <Continuous>  dextrose 50% Injectable 25 Gram(s) IV Push once  dextrose 50% Injectable 12.5 Gram(s) IV Push once  dextrose 50% Injectable 25 Gram(s) IV Push once  famotidine    Tablet 20 milliGRAM(s) Oral every 12 hours  glucagon  Injectable 1 milliGRAM(s) IntraMuscular once  glucagon  Injectable 1 milliGRAM(s) IntraMuscular once  heparin   Injectable 5000 Unit(s) SubCutaneous every 12 hours  insulin lispro (ADMELOG) corrective regimen sliding scale   SubCutaneous three times a day before meals  insulin lispro (ADMELOG) corrective regimen sliding scale   SubCutaneous at bedtime  lactated ringers. 1000 milliLiter(s) (75 mL/Hr) IV Continuous <Continuous>  polyethylene glycol 3350 17 Gram(s) Oral daily  senna 2 Tablet(s) Oral at bedtime    MEDICATIONS  (PRN):  bisacodyl Suppository 10 milliGRAM(s) Rectal daily PRN If no bowel movement  dextrose Oral Gel 15 Gram(s) Oral once PRN Blood Glucose LESS THAN 70 milliGRAM(s)/deciliter  magnesium hydroxide Suspension 30 milliLiter(s) Oral daily PRN Constipation  melatonin 3 milliGRAM(s) Oral at bedtime PRN Insomnia  morphine  - Injectable 4 milliGRAM(s) IV Push every 4 hours PRN Severe Pain (7 - 10)  ondansetron Injectable 4 milliGRAM(s) IV Push every 6 hours PRN Nausea and/or Vomiting  oxyCODONE    IR 2.5 milliGRAM(s) Oral every 4 hours PRN Moderate Pain (4 - 6)  oxyCODONE    IR 5 milliGRAM(s) Oral every 4 hours PRN Severe Pain (7 - 10)        T(C): , Max: 36.8 (09-06-22 @ 23:00)  T(F): , Max: 98.2 (09-06-22 @ 23:00)  HR: 72 (09-07-22 @ 08:33)  BP: 163/66 (09-07-22 @ 08:33)  BP(mean): 94 (09-06-22 @ 16:38)  RR: 18 (09-07-22 @ 08:33)  SpO2: 96% (09-07-22 @ 08:33)  Wt(kg): --        PHYSICAL EXAM:    Constitutional: NAD   HEENT:  MM  dist  Cardiovascular: S1 and S2   Extremities: tr peripheral edema  Neurological: A/O x 3   : No Murillo  Skin: No rashes  Access: Not applicable        LABS:                        9.1    6.07  )-----------( 166      ( 07 Sep 2022 08:54 )             27.6     07 Sep 2022 08:54    140    |  109    |  74     ----------------------------<  123    3.9     |  23     |  2.97   06 Sep 2022 08:46    140    |  109    |  77     ----------------------------<  152    3.7     |  21     |  3.28   05 Sep 2022 07:14    138    |  107    |  71     ----------------------------<  157    4.2     |  24     |  3.17   04 Sep 2022 15:27    139    |  108    |  69     ----------------------------<  180    4.0     |  24     |  2.83   04 Sep 2022 08:36    139    |  107    |  69     ----------------------------<  125    3.8     |  26     |  2.81     Ca    8.3        07 Sep 2022 08:54  Ca    8.2        06 Sep 2022 08:46  Ca    7.7        05 Sep 2022 07:14  Ca    7.9        04 Sep 2022 15:27  Ca    8.2        04 Sep 2022 08:36  Phos  4.8       04 Sep 2022 08:36  Mg     2.3       04 Sep 2022 08:36    TPro  x      /  Alb  3.0    /  TBili  x      /  DBili  x      /  AST  x      /  ALT  x      /  AlkPhos  x      04 Sep 2022 08:36  TPro  7.3    /  Alb  3.2    /  TBili  0.6    /  DBili  x      /  AST  30     /  ALT  18     /  AlkPhos  90     03 Sep 2022 22:27          Urine Studies:          RADIOLOGY & ADDITIONAL STUDIES:
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
## 2022-09-08 LAB
ANION GAP SERPL CALC-SCNC: 8 MMOL/L — SIGNIFICANT CHANGE UP (ref 5–17)
BUN SERPL-MCNC: 68 MG/DL — HIGH (ref 7–23)
CALCIUM SERPL-MCNC: 8.2 MG/DL — LOW (ref 8.5–10.1)
CHLORIDE SERPL-SCNC: 109 MMOL/L — HIGH (ref 96–108)
CO2 SERPL-SCNC: 22 MMOL/L — SIGNIFICANT CHANGE UP (ref 22–31)
CREAT SERPL-MCNC: 3.04 MG/DL — HIGH (ref 0.5–1.3)
EGFR: 21 ML/MIN/1.73M2 — LOW
GLUCOSE SERPL-MCNC: 106 MG/DL — HIGH (ref 70–99)
HCT VFR BLD CALC: 27.5 % — LOW (ref 39–50)
HGB BLD-MCNC: 9.1 G/DL — LOW (ref 13–17)
MCHC RBC-ENTMCNC: 29.9 PG — SIGNIFICANT CHANGE UP (ref 27–34)
MCHC RBC-ENTMCNC: 33.1 GM/DL — SIGNIFICANT CHANGE UP (ref 32–36)
MCV RBC AUTO: 90.5 FL — SIGNIFICANT CHANGE UP (ref 80–100)
PLATELET # BLD AUTO: 187 K/UL — SIGNIFICANT CHANGE UP (ref 150–400)
POTASSIUM SERPL-MCNC: 3.8 MMOL/L — SIGNIFICANT CHANGE UP (ref 3.5–5.3)
POTASSIUM SERPL-SCNC: 3.8 MMOL/L — SIGNIFICANT CHANGE UP (ref 3.5–5.3)
RBC # BLD: 3.04 M/UL — LOW (ref 4.2–5.8)
RBC # FLD: 13.2 % — SIGNIFICANT CHANGE UP (ref 10.3–14.5)
SODIUM SERPL-SCNC: 139 MMOL/L — SIGNIFICANT CHANGE UP (ref 135–145)
WBC # BLD: 6.68 K/UL — SIGNIFICANT CHANGE UP (ref 3.8–10.5)
WBC # FLD AUTO: 6.68 K/UL — SIGNIFICANT CHANGE UP (ref 3.8–10.5)

## 2022-09-08 PROCEDURE — 99232 SBSQ HOSP IP/OBS MODERATE 35: CPT

## 2022-09-08 PROCEDURE — 93010 ELECTROCARDIOGRAM REPORT: CPT

## 2022-09-08 PROCEDURE — 99231 SBSQ HOSP IP/OBS SF/LOW 25: CPT

## 2022-09-08 RX ORDER — AMLODIPINE BESYLATE 2.5 MG/1
5 TABLET ORAL ONCE
Refills: 0 | Status: COMPLETED | OUTPATIENT
Start: 2022-09-08 | End: 2022-09-08

## 2022-09-08 RX ORDER — AMLODIPINE BESYLATE 2.5 MG/1
1 TABLET ORAL
Qty: 30 | Refills: 0
Start: 2022-09-08 | End: 2022-10-07

## 2022-09-08 RX ORDER — LOSARTAN POTASSIUM 100 MG/1
1 TABLET, FILM COATED ORAL
Qty: 30 | Refills: 0
Start: 2022-09-08 | End: 2022-10-07

## 2022-09-08 RX ORDER — LOSARTAN POTASSIUM 100 MG/1
25 TABLET, FILM COATED ORAL DAILY
Refills: 0 | Status: DISCONTINUED | OUTPATIENT
Start: 2022-09-08 | End: 2022-09-09

## 2022-09-08 RX ORDER — ASPIRIN/CALCIUM CARB/MAGNESIUM 324 MG
1 TABLET ORAL
Qty: 56 | Refills: 0
Start: 2022-09-08 | End: 2022-10-05

## 2022-09-08 RX ORDER — OXYCODONE HYDROCHLORIDE 5 MG/1
1 TABLET ORAL
Qty: 28 | Refills: 0
Start: 2022-09-08 | End: 2022-09-14

## 2022-09-08 RX ADMIN — LOSARTAN POTASSIUM 25 MILLIGRAM(S): 100 TABLET, FILM COATED ORAL at 13:31

## 2022-09-08 RX ADMIN — AMLODIPINE BESYLATE 5 MILLIGRAM(S): 2.5 TABLET ORAL at 05:50

## 2022-09-08 RX ADMIN — OXYCODONE HYDROCHLORIDE 5 MILLIGRAM(S): 5 TABLET ORAL at 09:22

## 2022-09-08 RX ADMIN — Medication 3 MILLIGRAM(S): at 22:13

## 2022-09-08 RX ADMIN — OXYCODONE HYDROCHLORIDE 5 MILLIGRAM(S): 5 TABLET ORAL at 22:12

## 2022-09-08 RX ADMIN — AMLODIPINE BESYLATE 5 MILLIGRAM(S): 2.5 TABLET ORAL at 09:21

## 2022-09-08 NOTE — PATIENT PROFILE ADULT. - PRO INTERPRETER NEED 2
"CC: Well woman exam    Sulema Arthur is a 47 y.o. female  presents for a well woman exam.    Since Monday has had some pressure with urination. NO frequency or urgency just pressure.     Past Medical History:   Diagnosis Date    Abnormal Pap smear of cervix     Acid reflux     Anxiety     Depression     Diabetes mellitus     History of ovarian cyst     Hyperlipidemia     Irregular heart beats      Past Surgical History:   Procedure Laterality Date    Diagnostic Laparoscopy      with L oophorectomy Dr Burgos    HYSTERECTOMY  2008    menorrhagia, ovarian cyst Dr Burgos    OOPHORECTOMY Left     Dr Burgos    OVARIAN CYST REMOVAL      multiple cyst removed      Family History   Problem Relation Age of Onset    Throat cancer Paternal Uncle     Cervical cancer Cousin     Ovarian cancer Maternal Cousin     Breast cancer Paternal Cousin     Breast cancer Paternal Cousin     Uterine cancer Neg Hx     Colon cancer Neg Hx      Social History     Tobacco Use    Smoking status: Never    Smokeless tobacco: Never   Substance Use Topics    Alcohol use: No    Drug use: No     OB History          3    Para   2    Term   2            AB   1    Living   2         SAB   1    IAB        Ectopic        Multiple        Live Births   2                 /84 (BP Location: Right arm, Patient Position: Sitting, BP Method: Medium (Manual))   Ht 5' 4" (1.626 m)   Wt 116.8 kg (257 lb 8 oz)   BMI 44.20 kg/m²     ROS:  GENERAL: Denies weight gain or weight loss. Feeling well overall.   SKIN: Denies rash or lesions.   HEAD: Denies head injury or headache.   NODES: Denies enlarged lymph nodes.   CHEST: Denies chest pain or shortness of breath.   CARDIOVASCULAR: Denies palpitations or left sided chest pain.   ABDOMEN: No abdominal pain, constipation, diarrhea, nausea, vomiting or rectal bleeding.   URINARY: No frequency, dysuria, hematuria, or burning on urination.  REPRODUCTIVE: See HPI.   BREASTS: The patient performs breast " self-examination and denies pain, lumps, or nipple discharge.   HEMATOLOGIC: No easy bruisability or excessive bleeding.   MUSCULOSKELETAL: Denies joint pain or swelling.   NEUROLOGIC: Denies syncope or weakness.   PSYCHIATRIC: Denies depression, anxiety or mood swings.    PE:   APPEARANCE: Well nourished, well developed, in no acute distress.  AFFECT: WNL, alert and oriented x 3.  SKIN: No acne or hirsutism.  NECK: Neck symmetric without masses or thyromegaly.  NODES: No inguinal, cervical, axillary or femoral lymph node enlargement.  CHEST: Good respiratory effort.   ABDOMEN: Soft. No tenderness or masses. No hepatosplenomegaly. No hernias.  BREASTS: Symmetrical, no skin changes or visible lesions. No palpable masses, nipple discharge bilaterally.  PELVIC: Normal external female genitalia without lesions. Normal hair distribution. Adequate perineal body, normal urethral meatus. Vagina atrophic without lesions or discharge. No significant cystocele or rectocele. Bimanual exam shows uterus and cervix to be surgically absent. Adnexa without masses or tenderness.    1. Encounter for gynecological examination without abnormal finding        2. Dysuria  POCT urine dipstick without microscope    Urine culture    Urinalysis       and PLAN:    Patient was counseled today on A.C.S. Pap guidelines and recommendations for yearly pelvic exams, mammograms and monthly self breast exams; to see her PCP for other health maintenance.       UA dip trace protein   Send urine to lab for ua and culture    Push water, better control of diabetes                   English
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
## 2022-09-08 NOTE — PROGRESS NOTE ADULT - SUBJECTIVE AND OBJECTIVE BOX
Patient seen and examined at bedside. Pain well controlled with medication. Patient reports that he has not worked with PT because he "knows his body best". Patient denies any new numbness, tingling, weakness, or any other orthopaedic complaint. Denies N/V/CP/SOB.       Vital Signs Last 24 Hrs  T(C): 36.4 (07 Sep 2022 23:37), Max: 36.7 (07 Sep 2022 20:58)  T(F): 97.6 (07 Sep 2022 23:37), Max: 98.1 (07 Sep 2022 20:58)  HR: 77 (07 Sep 2022 23:37) (72 - 78)  BP: 173/68 (07 Sep 2022 23:37) (163/66 - 174/77)  BP(mean): --  RR: 18 (07 Sep 2022 23:37) (18 - 19)  SpO2: 98% (07 Sep 2022 23:37) (96% - 99%)    Parameters below as of 07 Sep 2022 23:37  Patient On (Oxygen Delivery Method): room air        Exam:  NAD AAOx3    LLE  Dressing clean and dry  +EHL FHL TA GS  SILT L3-S1  +DP  NTTP b/l calves  SCDs in place    A/P:  72M POD 4, stable, s/p L Hip IM Nail for subtroch fracture  -Analgesia  -DVT PE ppx, per AC team - heparin  -OOB PT   -ICE over Left hip  -WBAT  -Incentive spirometry  -Appreciate medical management  -dispo planning, ortho stable for DC   -ortho to SO
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
## 2022-09-09 ENCOUNTER — TRANSCRIPTION ENCOUNTER (OUTPATIENT)
Age: 72
End: 2022-09-09

## 2022-09-09 VITALS
TEMPERATURE: 97 F | DIASTOLIC BLOOD PRESSURE: 76 MMHG | SYSTOLIC BLOOD PRESSURE: 162 MMHG | HEART RATE: 75 BPM | RESPIRATION RATE: 18 BRPM | OXYGEN SATURATION: 97 %

## 2022-09-09 LAB
ANION GAP SERPL CALC-SCNC: 8 MMOL/L — SIGNIFICANT CHANGE UP (ref 5–17)
BUN SERPL-MCNC: 68 MG/DL — HIGH (ref 7–23)
CALCIUM SERPL-MCNC: 8.8 MG/DL — SIGNIFICANT CHANGE UP (ref 8.5–10.1)
CHLORIDE SERPL-SCNC: 108 MMOL/L — SIGNIFICANT CHANGE UP (ref 96–108)
CO2 SERPL-SCNC: 24 MMOL/L — SIGNIFICANT CHANGE UP (ref 22–31)
CREAT SERPL-MCNC: 2.87 MG/DL — HIGH (ref 0.5–1.3)
EGFR: 23 ML/MIN/1.73M2 — LOW
GLUCOSE SERPL-MCNC: 134 MG/DL — HIGH (ref 70–99)
HCT VFR BLD CALC: 28.5 % — LOW (ref 39–50)
HGB BLD-MCNC: 9.3 G/DL — LOW (ref 13–17)
MCHC RBC-ENTMCNC: 29.3 PG — SIGNIFICANT CHANGE UP (ref 27–34)
MCHC RBC-ENTMCNC: 32.6 GM/DL — SIGNIFICANT CHANGE UP (ref 32–36)
MCV RBC AUTO: 89.9 FL — SIGNIFICANT CHANGE UP (ref 80–100)
PLATELET # BLD AUTO: 231 K/UL — SIGNIFICANT CHANGE UP (ref 150–400)
POTASSIUM SERPL-MCNC: 3.7 MMOL/L — SIGNIFICANT CHANGE UP (ref 3.5–5.3)
POTASSIUM SERPL-SCNC: 3.7 MMOL/L — SIGNIFICANT CHANGE UP (ref 3.5–5.3)
RBC # BLD: 3.17 M/UL — LOW (ref 4.2–5.8)
RBC # FLD: 13.2 % — SIGNIFICANT CHANGE UP (ref 10.3–14.5)
SODIUM SERPL-SCNC: 140 MMOL/L — SIGNIFICANT CHANGE UP (ref 135–145)
WBC # BLD: 6.92 K/UL — SIGNIFICANT CHANGE UP (ref 3.8–10.5)
WBC # FLD AUTO: 6.92 K/UL — SIGNIFICANT CHANGE UP (ref 3.8–10.5)

## 2022-09-09 PROCEDURE — 99239 HOSP IP/OBS DSCHRG MGMT >30: CPT

## 2022-09-09 RX ADMIN — OXYCODONE HYDROCHLORIDE 5 MILLIGRAM(S): 5 TABLET ORAL at 12:15

## 2022-09-09 RX ADMIN — OXYCODONE HYDROCHLORIDE 2.5 MILLIGRAM(S): 5 TABLET ORAL at 12:33

## 2022-09-09 RX ADMIN — OXYCODONE HYDROCHLORIDE 5 MILLIGRAM(S): 5 TABLET ORAL at 10:14

## 2022-09-09 NOTE — PROGRESS NOTE ADULT - SUBJECTIVE AND OBJECTIVE BOX
72 year old male w hx polio,  multiple ortho Sx,  CKD, HTN not on meds presented to ED by EMS c/o L hip/leg pain. Slipped on the step in hot tub and now w severe L hip pain after feeling his leg twist. Remained in hot tub for 6 hrs because he didn't want to disrupt grandson's birthday. Pt is non-ambulatory at baseline,  but uses wheelchair and transfers independently .  In ED /72   HR 68   RR 18   T 97.9    97% sat RA  CXR no acute infiltrate  xrays + +L hip  fx   Now Pt is s/p Left Hip IM Nail for fracture. Pt is afebrile.  Pain is controlled. Exam reveals intact EHL FHL TA GS, +DP. Dressing is clean and dry.  Pt was evaluated by A/c team,  plan for ECASA 325mg BID x 28days   Today Pt reports no complains, feels fine, has  his  "usual pain". BP discussed, reports that doesn't check his BP at home. Meds and outPt f/u discussed. Pt declined NYDIA, reports that is independent       9/9: D/c was canceled yesterday as Pt refused, no events overnight, report sno complains, was able to transfer to motorized chair with min help. As per RN refused am meds      Vital Signs Last 24 Hrs  T(C): 36.3 (09 Sep 2022 08:35), Max: 36.3 (09 Sep 2022 08:35)  T(F): 97.3 (09 Sep 2022 08:35), Max: 97.3 (09 Sep 2022 08:35)  HR: 75 (09 Sep 2022 08:35) (75 - 75)  BP: 162/76 (09 Sep 2022 08:35) (162/76 - 162/76)  RR: 18 (09 Sep 2022 08:35) (18 - 18)  SpO2: 97% (09 Sep 2022 08:35) (97% - 97%)    Parameters below as of 09 Sep 2022 08:35  Patient On (Oxygen Delivery Method): room air        PHYSICAL EXAM:  General: Well developed;   in no acute distress  Eyes: EOMI; conjunctiva and sclera clear  Head: Normocephalic; atraumatic  ENMT: No nasal discharge; airway clear  Neck: Supple; non tender; no masses  Respiratory:  Decreased BS at bases, Mild wheezes LLL, resolved with deep breathing   Cardiovascular: Regular rate and rhythm. S1 and S2 Normal;   Gastrointestinal: Soft non-tender non-distended; Normal bowel sounds  Genitourinary: No  suprapubic  tenderness  Extremities: Nor edema, LE muscle atrophy , L thigh dressing D/C/I   Vascular: Peripheral pulses palpable 2+ bilaterally  Neurological: Alert and oriented x3, paraplegic   Skin: Warm and dry. No acute rash  Lymph Nodes: No acute cervical adenopathy  Psychiatric: Cooperative and appropriate      72 year old male w hx polio, nonambulatory, hx DM II (states none since 100 lb weight loss)  admitted w     # (L) sided Hip fracture  # S/p  s/p L Hip IM Nail for subtroch fracture , stable,   - ICE over Left hip PRN   - s/p  prophylactic abx x24hrs  - Incentive spirometry  - pain management  - ASA 326mf PO BID x 28days     # Hypertensive urgency. BP better    - c/w  Norvasc, dose increased to 10mg PPO QD  - Add Losartan 25mg PO QD       # CKD  3b  - baseline Scr likely around 2.8- 3  - Overall improved   - UA not suggestive of active infection  - patient had renal problems for  over 10 years now  -  sees UROLOGY in [place], no nephrology  - declined work up   - Dr. Jose David guardado appreciated, Pt was warned that may end up on HD     # Anemia most likely combination of acute blood loss anemia secondary to recent surgery and  Chronic Dz anemia related to CKD   - H/H stable     # Leukocytosis  likely reactive , resolved   - was initially started on empiric abxs but stopped  - No UCX sent   - No symptoms of UTI     # Polio as a child  #Nonambulatory but was able to stand and able to transfer into wheelchair independently    # VTE  on heparin SQ, will c/w  BID       Dispo; stable for d/c home with HC today when transport and HC arranged

## 2022-09-09 NOTE — PROGRESS NOTE ADULT - SUBJECTIVE AND OBJECTIVE BOX
Patient seen and examined at bedside. Pain well controlled with medication. Patient denies any numbness, tingling, weakness, or any other orthopaedic complaint. Denies N/V/CP/SOB. Patient continues to refuse to work with PT and to refuse medical intervention/recommendation regarding rising creatinine.        VITAL SIGNS:  T(C): 36.6 (09-08-22 @ 08:38), Max: 36.6 (09-08-22 @ 08:38)  HR: 80 (09-08-22 @ 10:36) (78 - 80)  BP: 165/77 (09-08-22 @ 10:36) (165/77 - 182/70)  RR: 18 (09-08-22 @ 08:38) (18 - 18)  SpO2: 97% (09-08-22 @ 08:38) (97% - 97%)      LABS:                        9.1    6.68  )-----------( 187      ( 08 Sep 2022 08:25 )             27.5     09-08    139  |  109<H>  |  68<H>  ----------------------------<  106<H>  3.8   |  22  |  3.04<H>    Ca    8.2<L>      08 Sep 2022 08:25        Exam:  NAD AAOx3    LLE  Dressing over proximal hip  with mild saturation   +EHL FHL TA GS  SILT L3-S1  +DP  NTTP b/l calves  SCDs in place    A/P:  72M POD 5, stable, s/p L Hip IM Nail for subtroch fracture  -Analgesia  -DVT PE ppx, per AC team - heparin  -OOB PT   -ICE over Left hip  -WBAT  -Incentive spirometry  -Appreciate medical management  -dispo planning, ortho stable for DC   -ortho to SO

## 2022-09-09 NOTE — DISCHARGE NOTE NURSING/CASE MANAGEMENT/SOCIAL WORK - PATIENT PORTAL LINK FT
You can access the FollowMyHealth Patient Portal offered by [facility] by registering at the following website: [email or web address]. By joining [facility]’s FollowMyHealth portal, you will also be able to view your health information using other applications (apps) compatible with our system.

## 2022-09-09 NOTE — PROVIDER CONTACT NOTE (OTHER) - BACKGROUND
9/8/20 Patient: Whitney Leone YOB: 1986 Date of Visit: 9/2/2020 Ron Maldonado NP 
[address] [address] VIA In Basket Dear Ron Maldonado NP, Thank you for referring Ms. Whitney Leone to [id number] [place] [address] for evaluation. My notes for this consultation are attached. If you have questions, please do not hesitate to call me. I look forward to following your patient along with you. Sincerely, Evangelist Galeana MD 
 

Admit dx. Left hip fracture,  post-op day 5.
Pt came in for L hip fx, he had an ORIF done yesterday,

## 2022-09-09 NOTE — PROVIDER CONTACT NOTE (OTHER) - SITUATION
Patient refusing medications. Pt discharge order to home today- states he doesn't take any meds at home and will not take any here.
Pt is A&Ox4, refusing his nighttime heparin injection

## 2022-09-09 NOTE — PROGRESS NOTE ADULT - REASON FOR ADMISSION
L hip fracture

## 2022-09-14 DIAGNOSIS — I89.0 LYMPHEDEMA, NOT ELSEWHERE CLASSIFIED: ICD-10-CM

## 2022-09-14 DIAGNOSIS — Z79.84 LONG TERM (CURRENT) USE OF ORAL HYPOGLYCEMIC DRUGS: ICD-10-CM

## 2022-09-14 DIAGNOSIS — Z53.20 PROCEDURE AND TREATMENT NOT CARRIED OUT BECAUSE OF PATIENT'S DECISION FOR UNSPECIFIED REASONS: ICD-10-CM

## 2022-09-14 DIAGNOSIS — N31.9 NEUROMUSCULAR DYSFUNCTION OF BLADDER, UNSPECIFIED: ICD-10-CM
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
## 2022-09-14 DIAGNOSIS — D63.1 ANEMIA IN CHRONIC KIDNEY DISEASE: ICD-10-CM

## 2022-09-14 DIAGNOSIS — N40.1 BENIGN PROSTATIC HYPERPLASIA WITH LOWER URINARY TRACT SYMPTOMS: ICD-10-CM

## 2022-09-14 DIAGNOSIS — I16.0 HYPERTENSIVE URGENCY: ICD-10-CM

## 2022-09-14 DIAGNOSIS — I27.20 PULMONARY HYPERTENSION, UNSPECIFIED: ICD-10-CM

## 2022-09-14 DIAGNOSIS — E66.9 OBESITY, UNSPECIFIED: ICD-10-CM

## 2022-09-14 DIAGNOSIS — N99.0 POSTPROCEDURAL (ACUTE) (CHRONIC) KIDNEY FAILURE: ICD-10-CM

## 2022-09-14 DIAGNOSIS — Z99.3 DEPENDENCE ON WHEELCHAIR: ICD-10-CM

## 2022-09-14 DIAGNOSIS — I12.9 HYPERTENSIVE CHRONIC KIDNEY DISEASE WITH STAGE 1 THROUGH STAGE 4 CHRONIC KIDNEY DISEASE, OR UNSPECIFIED CHRONIC KIDNEY DISEASE: ICD-10-CM

## 2022-09-14 DIAGNOSIS — D62 ACUTE POSTHEMORRHAGIC ANEMIA: ICD-10-CM
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
## 2022-09-14 DIAGNOSIS — R33.8 OTHER RETENTION OF URINE: ICD-10-CM

## 2022-09-14 DIAGNOSIS — Z82.69 FAMILY HISTORY OF OTHER DISEASES OF THE MUSCULOSKELETAL SYSTEM AND CONNECTIVE TISSUE: ICD-10-CM

## 2022-09-14 DIAGNOSIS — N18.32 CHRONIC KIDNEY DISEASE, STAGE 3B: ICD-10-CM

## 2022-09-14 DIAGNOSIS — E11.22 TYPE 2 DIABETES MELLITUS WITH DIABETIC CHRONIC KIDNEY DISEASE: ICD-10-CM

## 2022-09-14 DIAGNOSIS — S72.332A DISPLACED OBLIQUE FRACTURE OF SHAFT OF LEFT FEMUR, INITIAL ENCOUNTER FOR CLOSED FRACTURE: ICD-10-CM

## 2022-09-14 DIAGNOSIS — E11.65 TYPE 2 DIABETES MELLITUS WITH HYPERGLYCEMIA: ICD-10-CM

## 2022-09-14 DIAGNOSIS — Z86.12 PERSONAL HISTORY OF POLIOMYELITIS: ICD-10-CM
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
## 2022-09-14 DIAGNOSIS — Z83.3 FAMILY HISTORY OF DIABETES MELLITUS: ICD-10-CM

## 2022-09-14 DIAGNOSIS — D72.828 OTHER ELEVATED WHITE BLOOD CELL COUNT: ICD-10-CM

## 2022-12-07 ENCOUNTER — NON-APPOINTMENT (OUTPATIENT)
Age: 72
End: 2022-12-07

## 2022-12-07 DIAGNOSIS — M25.552 PAIN IN LEFT HIP: ICD-10-CM

## 2022-12-09 NOTE — H&P ADULT - PROBLEM/PLAN-3
DISPLAY PLAN FREE TEXT FAMILY HISTORY:  Father  Still living? Unknown  FH: myocardial infarction, Age at diagnosis: Age Unknown

## 2023-12-12 NOTE — PROVIDER CONTACT NOTE (MEDICATION) - NAME OF MD/NP/PA/DO NOTIFIED:
Brannon Sharma was seen and treated in our emergency department on 12/12/2023.  He may return to work on 12/15/2023.       If you have any questions or concerns, please don't hesitate to call.      Niels Broussard D.O.
Brannon Sharma was seen and treated in our emergency department on 12/12/2023.  He may return to work on 12/15/2023.       If you have any questions or concerns, please don't hesitate to call.      Niels Broussard D.O.
Maddison Scott
NP [facility]

## 2024-02-04 ENCOUNTER — INPATIENT (INPATIENT)
Facility: HOSPITAL | Age: 74
LOS: 17 days | Discharge: HOME HEALTH | DRG: 982 | End: 2024-02-22
Attending: INTERNAL MEDICINE | Admitting: FAMILY MEDICINE
Payer: MEDICARE

## 2024-02-04 VITALS
DIASTOLIC BLOOD PRESSURE: 81 MMHG | HEART RATE: 84 BPM | SYSTOLIC BLOOD PRESSURE: 186 MMHG | OXYGEN SATURATION: 100 % | WEIGHT: 199.96 LBS | HEIGHT: 69 IN | RESPIRATION RATE: 18 BRPM | TEMPERATURE: 98 F

## 2024-02-04 DIAGNOSIS — N17.9 ACUTE KIDNEY FAILURE, UNSPECIFIED: ICD-10-CM

## 2024-02-04 LAB
ALBUMIN SERPL ELPH-MCNC: 2.9 G/DL — LOW (ref 3.3–5)
ALP SERPL-CCNC: 101 U/L — SIGNIFICANT CHANGE UP (ref 40–120)
ALT FLD-CCNC: 15 U/L — SIGNIFICANT CHANGE UP (ref 12–78)
ANION GAP SERPL CALC-SCNC: 9 MMOL/L — SIGNIFICANT CHANGE UP (ref 5–17)
APTT BLD: 33.8 SEC — SIGNIFICANT CHANGE UP (ref 24.5–35.6)
AST SERPL-CCNC: 10 U/L — LOW (ref 15–37)
BASOPHILS # BLD AUTO: 0.06 K/UL — SIGNIFICANT CHANGE UP (ref 0–0.2)
BASOPHILS NFR BLD AUTO: 0.4 % — SIGNIFICANT CHANGE UP (ref 0–2)
BILIRUB SERPL-MCNC: 0.4 MG/DL — SIGNIFICANT CHANGE UP (ref 0.2–1.2)
BUN SERPL-MCNC: 66 MG/DL — HIGH (ref 7–23)
CALCIUM SERPL-MCNC: 8.2 MG/DL — LOW (ref 8.5–10.1)
CHLORIDE SERPL-SCNC: 107 MMOL/L — SIGNIFICANT CHANGE UP (ref 96–108)
CO2 SERPL-SCNC: 21 MMOL/L — LOW (ref 22–31)
CREAT SERPL-MCNC: 4.41 MG/DL — HIGH (ref 0.5–1.3)
EGFR: 13 ML/MIN/1.73M2 — LOW
EOSINOPHIL # BLD AUTO: 0.18 K/UL — SIGNIFICANT CHANGE UP (ref 0–0.5)
EOSINOPHIL NFR BLD AUTO: 1.3 % — SIGNIFICANT CHANGE UP (ref 0–6)
GLUCOSE SERPL-MCNC: 95 MG/DL — SIGNIFICANT CHANGE UP (ref 70–99)
HCT VFR BLD CALC: 32.9 % — LOW (ref 39–50)
HGB BLD-MCNC: 10.5 G/DL — LOW (ref 13–17)
IMM GRANULOCYTES NFR BLD AUTO: 0.4 % — SIGNIFICANT CHANGE UP (ref 0–0.9)
INR BLD: 1.31 RATIO — HIGH (ref 0.85–1.18)
LACTATE SERPL-SCNC: 0.8 MMOL/L — SIGNIFICANT CHANGE UP (ref 0.7–2)
LYMPHOCYTES # BLD AUTO: 1.18 K/UL — SIGNIFICANT CHANGE UP (ref 1–3.3)
LYMPHOCYTES # BLD AUTO: 8.8 % — LOW (ref 13–44)
MCHC RBC-ENTMCNC: 29.6 PG — SIGNIFICANT CHANGE UP (ref 27–34)
MCHC RBC-ENTMCNC: 31.9 GM/DL — LOW (ref 32–36)
MCV RBC AUTO: 92.7 FL — SIGNIFICANT CHANGE UP (ref 80–100)
MONOCYTES # BLD AUTO: 0.94 K/UL — HIGH (ref 0–0.9)
MONOCYTES NFR BLD AUTO: 7 % — SIGNIFICANT CHANGE UP (ref 2–14)
NEUTROPHILS # BLD AUTO: 10.98 K/UL — HIGH (ref 1.8–7.4)
NEUTROPHILS NFR BLD AUTO: 82.1 % — HIGH (ref 43–77)
PLATELET # BLD AUTO: 230 K/UL — SIGNIFICANT CHANGE UP (ref 150–400)
POTASSIUM SERPL-MCNC: 4 MMOL/L — SIGNIFICANT CHANGE UP (ref 3.5–5.3)
POTASSIUM SERPL-SCNC: 4 MMOL/L — SIGNIFICANT CHANGE UP (ref 3.5–5.3)
PROT SERPL-MCNC: 7.7 GM/DL — SIGNIFICANT CHANGE UP (ref 6–8.3)
PROTHROM AB SERPL-ACNC: 14.7 SEC — HIGH (ref 9.5–13)
RBC # BLD: 3.55 M/UL — LOW (ref 4.2–5.8)
RBC # FLD: 13.7 % — SIGNIFICANT CHANGE UP (ref 10.3–14.5)
SODIUM SERPL-SCNC: 137 MMOL/L — SIGNIFICANT CHANGE UP (ref 135–145)
WBC # BLD: 13.39 K/UL — HIGH (ref 3.8–10.5)
WBC # FLD AUTO: 13.39 K/UL — HIGH (ref 3.8–10.5)

## 2024-02-04 PROCEDURE — 84100 ASSAY OF PHOSPHORUS: CPT

## 2024-02-04 PROCEDURE — 83550 IRON BINDING TEST: CPT

## 2024-02-04 PROCEDURE — 85027 COMPLETE CBC AUTOMATED: CPT

## 2024-02-04 PROCEDURE — 82962 GLUCOSE BLOOD TEST: CPT

## 2024-02-04 PROCEDURE — 83036 HEMOGLOBIN GLYCOSYLATED A1C: CPT

## 2024-02-04 PROCEDURE — 86923 COMPATIBILITY TEST ELECTRIC: CPT

## 2024-02-04 PROCEDURE — 87102 FUNGUS ISOLATION CULTURE: CPT

## 2024-02-04 PROCEDURE — 76770 US EXAM ABDO BACK WALL COMP: CPT | Mod: 26

## 2024-02-04 PROCEDURE — 83970 ASSAY OF PARATHORMONE: CPT

## 2024-02-04 PROCEDURE — 82728 ASSAY OF FERRITIN: CPT

## 2024-02-04 PROCEDURE — 87186 SC STD MICRODIL/AGAR DIL: CPT

## 2024-02-04 PROCEDURE — 87206 SMEAR FLUORESCENT/ACID STAI: CPT

## 2024-02-04 PROCEDURE — 77012 CT SCAN FOR NEEDLE BIOPSY: CPT

## 2024-02-04 PROCEDURE — 85018 HEMOGLOBIN: CPT

## 2024-02-04 PROCEDURE — 80053 COMPREHEN METABOLIC PANEL: CPT

## 2024-02-04 PROCEDURE — 36415 COLL VENOUS BLD VENIPUNCTURE: CPT

## 2024-02-04 PROCEDURE — 74176 CT ABD & PELVIS W/O CONTRAST: CPT | Mod: 26,MA

## 2024-02-04 PROCEDURE — C9290: CPT

## 2024-02-04 PROCEDURE — 85025 COMPLETE CBC W/AUTO DIFF WBC: CPT

## 2024-02-04 PROCEDURE — 80048 BASIC METABOLIC PNL TOTAL CA: CPT

## 2024-02-04 PROCEDURE — 70487 CT MAXILLOFACIAL W/DYE: CPT | Mod: 26,MA

## 2024-02-04 PROCEDURE — 73620 X-RAY EXAM OF FOOT: CPT | Mod: RT

## 2024-02-04 PROCEDURE — 86900 BLOOD TYPING SEROLOGIC ABO: CPT

## 2024-02-04 PROCEDURE — 83735 ASSAY OF MAGNESIUM: CPT

## 2024-02-04 PROCEDURE — 82607 VITAMIN B-12: CPT

## 2024-02-04 PROCEDURE — 82746 ASSAY OF FOLIC ACID SERUM: CPT

## 2024-02-04 PROCEDURE — 87015 SPECIMEN INFECT AGNT CONCNTJ: CPT

## 2024-02-04 PROCEDURE — 93880 EXTRACRANIAL BILAT STUDY: CPT

## 2024-02-04 PROCEDURE — 86901 BLOOD TYPING SEROLOGIC RH(D): CPT

## 2024-02-04 PROCEDURE — 51102 DRAIN BL W/CATH INSERTION: CPT

## 2024-02-04 PROCEDURE — 76770 US EXAM ABDO BACK WALL COMP: CPT

## 2024-02-04 PROCEDURE — 83540 ASSAY OF IRON: CPT

## 2024-02-04 PROCEDURE — C1729: CPT

## 2024-02-04 PROCEDURE — 87077 CULTURE AEROBIC IDENTIFY: CPT

## 2024-02-04 PROCEDURE — 85610 PROTHROMBIN TIME: CPT

## 2024-02-04 PROCEDURE — 99285 EMERGENCY DEPT VISIT HI MDM: CPT

## 2024-02-04 PROCEDURE — 87116 MYCOBACTERIA CULTURE: CPT

## 2024-02-04 PROCEDURE — 87086 URINE CULTURE/COLONY COUNT: CPT

## 2024-02-04 PROCEDURE — 86850 RBC ANTIBODY SCREEN: CPT

## 2024-02-04 PROCEDURE — 80061 LIPID PANEL: CPT

## 2024-02-04 PROCEDURE — 93970 EXTREMITY STUDY: CPT

## 2024-02-04 PROCEDURE — 93971 EXTREMITY STUDY: CPT | Mod: RT

## 2024-02-04 PROCEDURE — 85730 THROMBOPLASTIN TIME PARTIAL: CPT

## 2024-02-04 PROCEDURE — 82310 ASSAY OF CALCIUM: CPT

## 2024-02-04 RX ORDER — ACETAMINOPHEN 500 MG
650 TABLET ORAL EVERY 6 HOURS
Refills: 0 | Status: DISCONTINUED | OUTPATIENT
Start: 2024-02-04 | End: 2024-02-22

## 2024-02-04 RX ORDER — VANCOMYCIN HCL 1 G
1250 VIAL (EA) INTRAVENOUS ONCE
Refills: 0 | Status: COMPLETED | OUTPATIENT
Start: 2024-02-04 | End: 2024-02-04

## 2024-02-04 RX ORDER — SODIUM CHLORIDE 9 MG/ML
500 INJECTION INTRAMUSCULAR; INTRAVENOUS; SUBCUTANEOUS ONCE
Refills: 0 | Status: COMPLETED | OUTPATIENT
Start: 2024-02-04 | End: 2024-02-04

## 2024-02-04 RX ORDER — CEFTRIAXONE 500 MG/1
1000 INJECTION, POWDER, FOR SOLUTION INTRAMUSCULAR; INTRAVENOUS ONCE
Refills: 0 | Status: COMPLETED | OUTPATIENT
Start: 2024-02-04 | End: 2024-02-04

## 2024-02-04 RX ORDER — CEFTRIAXONE 500 MG/1
1000 INJECTION, POWDER, FOR SOLUTION INTRAMUSCULAR; INTRAVENOUS ONCE
Refills: 0 | Status: DISCONTINUED | OUTPATIENT
Start: 2024-02-04 | End: 2024-02-04

## 2024-02-04 RX ORDER — INFLUENZA VIRUS VACCINE 15; 15; 15; 15 UG/.5ML; UG/.5ML; UG/.5ML; UG/.5ML
0.7 SUSPENSION INTRAMUSCULAR ONCE
Refills: 0 | Status: COMPLETED | OUTPATIENT
Start: 2024-02-04 | End: 2024-02-04

## 2024-02-04 RX ORDER — DIPHENHYDRAMINE HCL 50 MG
50 CAPSULE ORAL ONCE
Refills: 0 | Status: COMPLETED | OUTPATIENT
Start: 2024-02-04 | End: 2024-02-04

## 2024-02-04 RX ADMIN — Medication 250 MILLIGRAM(S): at 16:05

## 2024-02-04 RX ADMIN — CEFTRIAXONE 1000 MILLIGRAM(S): 500 INJECTION, POWDER, FOR SOLUTION INTRAMUSCULAR; INTRAVENOUS at 14:24

## 2024-02-04 RX ADMIN — Medication 50 MILLIGRAM(S): at 22:38

## 2024-02-04 RX ADMIN — SODIUM CHLORIDE 1000 MILLILITER(S): 9 INJECTION INTRAMUSCULAR; INTRAVENOUS; SUBCUTANEOUS at 14:24

## 2024-02-04 NOTE — ED PROVIDER NOTE - PATIENT PORTAL LINK FT
You can access the FollowMyHealth Patient Portal offered by [facility] by registering at the following website: [email or web address]. By joining [facility]’s FollowMyHealth portal, you will also be able to view your health information using other applications (apps) compatible with our system.

## 2024-02-04 NOTE — PATIENT PROFILE ADULT - FALL HARM RISK - HARM RISK INTERVENTIONS
Assistance with ambulation/Assistance OOB with selected safe patient handling equipment/Communicate Risk of Fall with Harm to all staff/Discuss with provider need for PT consult/Monitor gait and stability/Provide patient with walking aids - walker, cane, crutches/Reinforce activity limits and safety measures with patient and family/Tailored Fall Risk Interventions/Use of alarms - bed, chair and/or voice tab/Visual Cue: Yellow wristband and red socks/Bed in lowest position, wheels locked, appropriate side rails in place/Call bell, personal items and telephone in reach/Instruct patient to call for assistance before getting out of bed or chair/Non-slip footwear when patient is out of bed/[place] to call system/Physically safe environment - no spills, clutter or unnecessary equipment/Purposeful Proactive Rounding/Room/bathroom lighting operational, light cord in reach

## 2024-02-04 NOTE — ED PROVIDER NOTE - OBJECTIVE STATEMENT
73-year-old with past medical history for diabetes, hypertension, polio presents emergency department with left facial swelling.  Patient started with symptoms approximately 3 days ago.  Patient noticed the painful area left nose.  Now patient with more swelling left side of face.  Patient did take some amoxicillin at home which helped but did not take last night and now swelling is worse today.  No fevers, no travels, no sick contacts.  Patient is due to have a suprapubic catheter placed at the end of the month.  Patient also has been having some increased itching all over her body.

## 2024-02-04 NOTE — ED PROVIDER NOTE - EYES, MLM
Clear bilaterally, pupils equal, round and reactive to light.  Extraocular movements intact no pain with extraocular movements

## 2024-02-04 NOTE — ED ADULT NURSE NOTE - CHIEF COMPLAINT QUOTE
Pt c/o L sided facial pain and swelling x2 days. Atraumatic. -NKDA. Denies difficulty breathing/swallowing/speaking. Pt is wheelchair bound, hx of polio, HTN, and DM. Denies ACE inhibitor use or fevers.

## 2024-02-04 NOTE — PHARMACOTHERAPY INTERVENTION NOTE - COMMENTS
Medication history complete. Medications and allergies reviewed with patient and confirmed with . Patient states that he is not currently taking prescription medication.

## 2024-02-04 NOTE — ED PROVIDER NOTE - ENMT, MLM
Airway patent, Nasal mucosa clear. Mouth with normal mucosa. Throat has no vesicles, no oropharyngeal exudates and uvula is midline.  Scalp swelling around the left eye and redness on the left side of face.  Swelling and pain to palpation left side of nose.  Turbinates left nares mildly swollen.

## 2024-02-04 NOTE — ED PROVIDER NOTE - PROGRESS NOTE DETAILS
Cely Amaya for attending Dr. Bowen:  Ok to give IV contrast even though low GFR as we need to r/o facial abscess

## 2024-02-04 NOTE — PATIENT PROFILE ADULT - FUNCTIONAL ASSESSMENT - BASIC MOBILITY 6.
3-calculated by average/Not able to assess (calculate score using [facility] averaging method)

## 2024-02-04 NOTE — ED ADULT NURSE NOTE - NSFALLRISKINTERV_ED_ALL_ED

## 2024-02-04 NOTE — PATIENT PROFILE ADULT - NSPROSPHOSPCHAPLAINYN_GEN_A_NUR
Pre-Hospital Care Report (PCR) no Ilumya Counseling: I discussed with the patient the risks of tildrakizumab including but not limited to immunosuppression, malignancy, posterior leukoencephalopathy syndrome, and serious infections.  The patient understands that monitoring is required including a PPD at baseline and must alert us or the primary physician if symptoms of infection or other concerning signs are noted.

## 2024-02-04 NOTE — ED ADULT NURSE NOTE - OBJECTIVE STATEMENT
Pt c/o L sided facial tenderness and swelling x2 days. Pt reports cloudy vision in R eye. Pt reports having a buildup of urine and was scheduled for surgery for a suprapubic catheter so he thinks this may be related. Denies difficulty breathing/swallowing/speaking. Pt is wheelchair bound, hx of polio, HTN, and DM. Denies ACE inhibitor and anticoagulant use or fevers. Pt denies CP/SOB. Pt reports chronic pain but no other complaints at this time. Safety and comfort measures maintained

## 2024-02-05 ENCOUNTER — TRANSCRIPTION ENCOUNTER (OUTPATIENT)
Age: 74
End: 2024-02-05

## 2024-02-05 DIAGNOSIS — N13.30 UNSPECIFIED HYDRONEPHROSIS: ICD-10-CM

## 2024-02-05 DIAGNOSIS — R33.9 RETENTION OF URINE, UNSPECIFIED: ICD-10-CM

## 2024-02-05 LAB
A1C WITH ESTIMATED AVERAGE GLUCOSE RESULT: 4.9 % — SIGNIFICANT CHANGE UP (ref 4–5.6)
ALBUMIN SERPL ELPH-MCNC: 2.5 G/DL — LOW (ref 3.3–5)
ALP SERPL-CCNC: 86 U/L — SIGNIFICANT CHANGE UP (ref 40–120)
ALT FLD-CCNC: 9 U/L — LOW (ref 12–78)
ANION GAP SERPL CALC-SCNC: 9 MMOL/L — SIGNIFICANT CHANGE UP (ref 5–17)
APTT BLD: 32.2 SEC — SIGNIFICANT CHANGE UP (ref 24.5–35.6)
AST SERPL-CCNC: 7 U/L — LOW (ref 15–37)
BASOPHILS # BLD AUTO: 0.05 K/UL — SIGNIFICANT CHANGE UP (ref 0–0.2)
BASOPHILS NFR BLD AUTO: 0.5 % — SIGNIFICANT CHANGE UP (ref 0–2)
BILIRUB SERPL-MCNC: 0.3 MG/DL — SIGNIFICANT CHANGE UP (ref 0.2–1.2)
BUN SERPL-MCNC: 66 MG/DL — HIGH (ref 7–23)
CALCIUM SERPL-MCNC: 7.7 MG/DL — LOW (ref 8.5–10.1)
CHLORIDE SERPL-SCNC: 108 MMOL/L — SIGNIFICANT CHANGE UP (ref 96–108)
CHOLEST SERPL-MCNC: 153 MG/DL — SIGNIFICANT CHANGE UP
CO2 SERPL-SCNC: 19 MMOL/L — LOW (ref 22–31)
CREAT SERPL-MCNC: 4.37 MG/DL — HIGH (ref 0.5–1.3)
EGFR: 14 ML/MIN/1.73M2 — LOW
EOSINOPHIL # BLD AUTO: 0.48 K/UL — SIGNIFICANT CHANGE UP (ref 0–0.5)
EOSINOPHIL NFR BLD AUTO: 4.5 % — SIGNIFICANT CHANGE UP (ref 0–6)
ESTIMATED AVERAGE GLUCOSE: 94 MG/DL — SIGNIFICANT CHANGE UP (ref 68–114)
GLUCOSE BLDC GLUCOMTR-MCNC: 108 MG/DL — HIGH (ref 70–99)
GLUCOSE BLDC GLUCOMTR-MCNC: 110 MG/DL — HIGH (ref 70–99)
GLUCOSE BLDC GLUCOMTR-MCNC: 92 MG/DL — SIGNIFICANT CHANGE UP (ref 70–99)
GLUCOSE BLDC GLUCOMTR-MCNC: 94 MG/DL — SIGNIFICANT CHANGE UP (ref 70–99)
GLUCOSE SERPL-MCNC: 90 MG/DL — SIGNIFICANT CHANGE UP (ref 70–99)
HCT VFR BLD CALC: 30.1 % — LOW (ref 39–50)
HDLC SERPL-MCNC: 36 MG/DL — LOW
HGB BLD-MCNC: 9.5 G/DL — LOW (ref 13–17)
IMM GRANULOCYTES NFR BLD AUTO: 0.5 % — SIGNIFICANT CHANGE UP (ref 0–0.9)
INR BLD: 1.16 RATIO — SIGNIFICANT CHANGE UP (ref 0.85–1.18)
LIPID PNL WITH DIRECT LDL SERPL: 98 MG/DL — SIGNIFICANT CHANGE UP
LYMPHOCYTES # BLD AUTO: 1.22 K/UL — SIGNIFICANT CHANGE UP (ref 1–3.3)
LYMPHOCYTES # BLD AUTO: 11.4 % — LOW (ref 13–44)
MCHC RBC-ENTMCNC: 28.8 PG — SIGNIFICANT CHANGE UP (ref 27–34)
MCHC RBC-ENTMCNC: 31.6 GM/DL — LOW (ref 32–36)
MCV RBC AUTO: 91.2 FL — SIGNIFICANT CHANGE UP (ref 80–100)
MONOCYTES # BLD AUTO: 1.16 K/UL — HIGH (ref 0–0.9)
MONOCYTES NFR BLD AUTO: 10.9 % — SIGNIFICANT CHANGE UP (ref 2–14)
NEUTROPHILS # BLD AUTO: 7.72 K/UL — HIGH (ref 1.8–7.4)
NEUTROPHILS NFR BLD AUTO: 72.2 % — SIGNIFICANT CHANGE UP (ref 43–77)
NON HDL CHOLESTEROL: 118 MG/DL — SIGNIFICANT CHANGE UP
PLATELET # BLD AUTO: 238 K/UL — SIGNIFICANT CHANGE UP (ref 150–400)
POTASSIUM SERPL-MCNC: 3.6 MMOL/L — SIGNIFICANT CHANGE UP (ref 3.5–5.3)
POTASSIUM SERPL-SCNC: 3.6 MMOL/L — SIGNIFICANT CHANGE UP (ref 3.5–5.3)
PROT SERPL-MCNC: 6.4 GM/DL — SIGNIFICANT CHANGE UP (ref 6–8.3)
PROTHROM AB SERPL-ACNC: 13.1 SEC — HIGH (ref 9.5–13)
RBC # BLD: 3.3 M/UL — LOW (ref 4.2–5.8)
RBC # FLD: 13.8 % — SIGNIFICANT CHANGE UP (ref 10.3–14.5)
SODIUM SERPL-SCNC: 136 MMOL/L — SIGNIFICANT CHANGE UP (ref 135–145)
TRIGL SERPL-MCNC: 109 MG/DL — SIGNIFICANT CHANGE UP
WBC # BLD: 10.68 K/UL — HIGH (ref 3.8–10.5)
WBC # FLD AUTO: 10.68 K/UL — HIGH (ref 3.8–10.5)

## 2024-02-05 PROCEDURE — 52000 CYSTOURETHROSCOPY: CPT

## 2024-02-05 PROCEDURE — 99221 1ST HOSP IP/OBS SF/LOW 40: CPT | Mod: 57

## 2024-02-05 PROCEDURE — 99223 1ST HOSP IP/OBS HIGH 75: CPT | Mod: 25

## 2024-02-05 PROCEDURE — 99223 1ST HOSP IP/OBS HIGH 75: CPT

## 2024-02-05 PROCEDURE — 99221 1ST HOSP IP/OBS SF/LOW 40: CPT

## 2024-02-05 PROCEDURE — 44188 LAP COLOSTOMY: CPT

## 2024-02-05 RX ORDER — VANCOMYCIN HCL 1 G
1500 VIAL (EA) INTRAVENOUS EVERY 24 HOURS
Refills: 0 | Status: DISCONTINUED | OUTPATIENT
Start: 2024-02-05 | End: 2024-02-05

## 2024-02-05 RX ORDER — FENTANYL CITRATE 50 UG/ML
50 INJECTION INTRAVENOUS
Refills: 0 | Status: DISCONTINUED | OUTPATIENT
Start: 2024-02-05 | End: 2024-02-05

## 2024-02-05 RX ORDER — INSULIN LISPRO 100/ML
VIAL (ML) SUBCUTANEOUS EVERY 6 HOURS
Refills: 0 | Status: DISCONTINUED | OUTPATIENT
Start: 2024-02-05 | End: 2024-02-07

## 2024-02-05 RX ORDER — ATORVASTATIN CALCIUM 80 MG/1
40 TABLET, FILM COATED ORAL AT BEDTIME
Refills: 0 | Status: DISCONTINUED | OUTPATIENT
Start: 2024-02-05 | End: 2024-02-22

## 2024-02-05 RX ORDER — DEXTROSE 50 % IN WATER 50 %
12.5 SYRINGE (ML) INTRAVENOUS ONCE
Refills: 0 | Status: DISCONTINUED | OUTPATIENT
Start: 2024-02-05 | End: 2024-02-22

## 2024-02-05 RX ORDER — GLUCAGON INJECTION, SOLUTION 0.5 MG/.1ML
1 INJECTION, SOLUTION SUBCUTANEOUS ONCE
Refills: 0 | Status: DISCONTINUED | OUTPATIENT
Start: 2024-02-05 | End: 2024-02-22

## 2024-02-05 RX ORDER — SODIUM CHLORIDE 9 MG/ML
1000 INJECTION, SOLUTION INTRAVENOUS
Refills: 0 | Status: DISCONTINUED | OUTPATIENT
Start: 2024-02-05 | End: 2024-02-05

## 2024-02-05 RX ORDER — DEXTROSE 50 % IN WATER 50 %
25 SYRINGE (ML) INTRAVENOUS ONCE
Refills: 0 | Status: DISCONTINUED | OUTPATIENT
Start: 2024-02-05 | End: 2024-02-22

## 2024-02-05 RX ORDER — AMLODIPINE BESYLATE 2.5 MG/1
5 TABLET ORAL DAILY
Refills: 0 | Status: DISCONTINUED | OUTPATIENT
Start: 2024-02-05 | End: 2024-02-05

## 2024-02-05 RX ORDER — SODIUM CHLORIDE 9 MG/ML
1000 INJECTION INTRAMUSCULAR; INTRAVENOUS; SUBCUTANEOUS
Refills: 0 | Status: DISCONTINUED | OUTPATIENT
Start: 2024-02-05 | End: 2024-02-06

## 2024-02-05 RX ORDER — HYDRALAZINE HCL 50 MG
5 TABLET ORAL ONCE
Refills: 0 | Status: COMPLETED | OUTPATIENT
Start: 2024-02-05 | End: 2024-02-05

## 2024-02-05 RX ORDER — SODIUM CHLORIDE 9 MG/ML
1000 INJECTION, SOLUTION INTRAVENOUS
Refills: 0 | Status: DISCONTINUED | OUTPATIENT
Start: 2024-02-05 | End: 2024-02-22

## 2024-02-05 RX ORDER — HYDROMORPHONE HYDROCHLORIDE 2 MG/ML
0.5 INJECTION INTRAMUSCULAR; INTRAVENOUS; SUBCUTANEOUS
Refills: 0 | Status: DISCONTINUED | OUTPATIENT
Start: 2024-02-05 | End: 2024-02-05

## 2024-02-05 RX ORDER — PIPERACILLIN AND TAZOBACTAM 4; .5 G/20ML; G/20ML
3.38 INJECTION, POWDER, LYOPHILIZED, FOR SOLUTION INTRAVENOUS EVERY 12 HOURS
Refills: 0 | Status: DISCONTINUED | OUTPATIENT
Start: 2024-02-05 | End: 2024-02-08

## 2024-02-05 RX ORDER — OXYCODONE HYDROCHLORIDE 5 MG/1
5 TABLET ORAL ONCE
Refills: 0 | Status: DISCONTINUED | OUTPATIENT
Start: 2024-02-05 | End: 2024-02-05

## 2024-02-05 RX ORDER — HYDROMORPHONE HYDROCHLORIDE 2 MG/ML
0.5 INJECTION INTRAMUSCULAR; INTRAVENOUS; SUBCUTANEOUS EVERY 4 HOURS
Refills: 0 | Status: DISCONTINUED | OUTPATIENT
Start: 2024-02-05 | End: 2024-02-10

## 2024-02-05 RX ORDER — DEXTROSE 50 % IN WATER 50 %
15 SYRINGE (ML) INTRAVENOUS ONCE
Refills: 0 | Status: DISCONTINUED | OUTPATIENT
Start: 2024-02-05 | End: 2024-02-22

## 2024-02-05 RX ORDER — ONDANSETRON 8 MG/1
4 TABLET, FILM COATED ORAL ONCE
Refills: 0 | Status: DISCONTINUED | OUTPATIENT
Start: 2024-02-05 | End: 2024-02-05

## 2024-02-05 RX ORDER — AMLODIPINE BESYLATE 2.5 MG/1
5 TABLET ORAL ONCE
Refills: 0 | Status: COMPLETED | OUTPATIENT
Start: 2024-02-05 | End: 2024-02-05

## 2024-02-05 RX ORDER — AMLODIPINE BESYLATE 2.5 MG/1
5 TABLET ORAL DAILY
Refills: 0 | Status: DISCONTINUED | OUTPATIENT
Start: 2024-02-05 | End: 2024-02-08

## 2024-02-05 RX ADMIN — PIPERACILLIN AND TAZOBACTAM 25 GRAM(S): 4; .5 INJECTION, POWDER, LYOPHILIZED, FOR SOLUTION INTRAVENOUS at 22:07

## 2024-02-05 RX ADMIN — AMLODIPINE BESYLATE 5 MILLIGRAM(S): 2.5 TABLET ORAL at 11:00

## 2024-02-05 RX ADMIN — SODIUM CHLORIDE 125 MILLILITER(S): 9 INJECTION, SOLUTION INTRAVENOUS at 21:51

## 2024-02-05 RX ADMIN — HYDROMORPHONE HYDROCHLORIDE 0.5 MILLIGRAM(S): 2 INJECTION INTRAMUSCULAR; INTRAVENOUS; SUBCUTANEOUS at 22:15

## 2024-02-05 RX ADMIN — HYDROMORPHONE HYDROCHLORIDE 0.5 MILLIGRAM(S): 2 INJECTION INTRAMUSCULAR; INTRAVENOUS; SUBCUTANEOUS at 22:00

## 2024-02-05 RX ADMIN — HYDROMORPHONE HYDROCHLORIDE 0.5 MILLIGRAM(S): 2 INJECTION INTRAMUSCULAR; INTRAVENOUS; SUBCUTANEOUS at 21:51

## 2024-02-05 RX ADMIN — HYDROMORPHONE HYDROCHLORIDE 0.5 MILLIGRAM(S): 2 INJECTION INTRAMUSCULAR; INTRAVENOUS; SUBCUTANEOUS at 22:01

## 2024-02-05 RX ADMIN — PIPERACILLIN AND TAZOBACTAM 25 GRAM(S): 4; .5 INJECTION, POWDER, LYOPHILIZED, FOR SOLUTION INTRAVENOUS at 06:31

## 2024-02-05 NOTE — CONSULT NOTE ADULT - NS ATTEND AMEND GEN_ALL_CORE FT
Patient was seen and examined by me, agree with above note, where necessary edits were made.     Had Murillo catheter placed 10 years ago in [place] and per Patient bother with urination started from then on.   Had slow flow, day time frequency 2 to 3 x and nocturia 10 x with urinary incontinence.   Saw Dr Schumer and was recommended Suprapubic catheter.     Discussed concern for chronic bladder outlet obstruction secondary to BPH and for bladder dysfunction.   Discussed treatment options including indwelling Murillo catheter Vs Suprapubic catheter.   Discussed risks and benefits of each.   Patient does not want indwelling Murillo catheter and would like to proceed with Suprapubic catheter.   Discussed risks and benefits. Informed consent obtained.

## 2024-02-05 NOTE — CONSULT NOTE ADULT - ASSESSMENT
Impression:  1. SONDRA on CKD  2. urinary retention/ bilateral hydronephrosis  3. sp attempted suprapubic catheter placement with rectal injury  4. sp diagnostic laparoscopy/rigid proctosigmoidoscopy, laparscopic creation of diverting loop colostomy  5. Diabetes mellitus    Plan:  Admit to SICU.    Neuro - nonfocal, analgesia PRN- multimodal, avoid Morphine given SONDRA               CT showing high grade L carotid stenosis, Vascular consult pending    CV - hemodynamics stable, NSR         norvasc for BP management, hold parameters for SBP<110         statin    Pulm -  stable on RA, nonlabored    GI -  NPO except meds         IV hydration with balanced fluids         surgical wound care as per surgical team    Renal - Cr elevated             avoid MAP<65             now with caude, maintain mcconnell             strict I/Os            renally adjust all meds            avoid nephrotoxins            f/u renal            f/u urology    Heme -  SCDS for now, chem DVT when cleared by sx               CBC in am               no signs of active bleeding    ID - IV abx with Zosyn and Doxy, BCx NGTD, f/u ID, trend WBC and temp curve    Endo -  BS euglycemic, ISS coverage [id number] while NPO.

## 2024-02-05 NOTE — BRIEF OPERATIVE NOTE - NSICDXBRIEFPROCEDURE_GEN_ALL_CORE_FT
PROCEDURES:  Cystoscopy 05-Feb-2024 21:55:13  Sohan Xiao  
PROCEDURES:  Diagnostic laparoscopy 05-Feb-2024 21:31:26  Yogi Angelo  Rigid proctosigmoidoscpy 05-Feb-2024 21:32:27  Yogi Angelo  Laparoscopic creation of diverting loop colostomy 05-Feb-2024 21:32:39  Yogi Angelo

## 2024-02-05 NOTE — BRIEF OPERATIVE NOTE - NSICDXBRIEFPREOP_GEN_ALL_CORE_FT
PRE-OP DIAGNOSIS:  Rectal injury 05-Feb-2024 21:32:54  Yogi Angelo  
PRE-OP DIAGNOSIS:  Urinary retention 05-Feb-2024 21:55:25  Sohan Xiao  Bilateral hydronephrosis 05-Feb-2024 21:55:32  Sohan Xiao

## 2024-02-05 NOTE — CONSULT NOTE ADULT - SUBJECTIVE AND OBJECTIVE BOX
CC: 73 y old  Male who presents with a chief complaint of L facial swelling    HPI:  74 y/o M w/ PMHx of DM2, HTN, polio with paraplegia, CKD IV, BPH, pulmonary HTN, presented to ED with c/o L facial/ periorbital  swelling. Pt reported that the swelling started 3 days ago, has been gotten progressively worse, is associated with pain and discomfort in his nose now spreading to left eye. Pt had CT maxillofacial bones; c/w infetious phlegmon-cellulitis, without focal abscess collection; versus chronic fungal sinus disease, a chronic sinusitis component. Incidentally noted on the CT is a high-grade stenosis involves the origin of the left internal carotid artery, likely measuring greater than 80%, + calcified plaque and moderate stenosis origin of HEATHER, hence neuro consulted    Pt noted to have b/l hydronephrosis with distended bladder on CT scan and worsening SONDRA, seen by urology, is supposed to have a suprapubic catheter placed      PAST MEDICAL HISTORY:  Polio  Diabetes  Hypertension      PSH:   R hip surgery,   shoulder surgeries,   L tibia ORIF       FAMILY HISTORY:  Father - DM       Social Hx:  Nonsmoker, no drug or alcohol use      MEDICATIONS  (STANDING):  amLODIPine   Tablet 5 milliGRAM(s) Oral daily  atorvastatin 40 milliGRAM(s) Oral at bedtime  doxycycline IVPB 100 milliGRAM(s) IV Intermittent every 12 hours  glucagon  Injectable 1 milliGRAM(s) IntraMuscular once  influenza  Vaccine (HIGH DOSE) 0.7 milliLiter(s) IntraMuscular once  insulin lispro (ADMELOG) corrective regimen sliding scale   SubCutaneous every 6 hours  piperacillin/tazobactam IVPB.. 3.375 Gram(s) IV Intermittent every 12 hours       Allergies  No Known Allergies      ROS: Pertinent positives in HPI, all other ROS were reviewed and are negative.        Vital Signs Last 24 Hrs  T(C): 36.3 (05 Feb 2024 15:36), Max: 37.3 (04 Feb 2024 20:24)  T(F): 97.4 (05 Feb 2024 15:36), Max: 99.1 (04 Feb 2024 20:24)  HR: 86 (05 Feb 2024 15:36) (80 - 89)  BP: 155/75 (05 Feb 2024 15:36) (155/75 - 187/64)  BP(mean): 95 (04 Feb 2024 20:24) (95 - 95)  RR: 18 (05 Feb 2024 15:36) (18 - 18)  SpO2: 98% (05 Feb 2024 15:36) (98% - 100%)    Parameters below as of 05 Feb 2024 15:36  Patient On (Oxygen Delivery Method): room air      Gen exam:   Normocephalic, in no distress, awake and alert.  HEENT: PERRLA, EOMI, left brooke-orbital ecchymosis / edema   Neck: Supple.  Respiratory: Breath sounds are clear bilaterally  Cardiovascular: S1 and S2, regular  Extremities:  no edema  Vascular: Caritid Bruit - no  Musculoskeletal: LE atrophic with joint stiffness  Skin: No rashes      Neurological exam:  HF: A x O x 3. Appropriately interactive, normal affect. Speech fluent, No Aphasia or paraphasic errors. Naming /repetition intact   CN: ARIAS, EOMI, VFF, facial sensation normal, no NLFD, tongue midline, Palate moves equally, SCM equal bilaterally  Motor: No pronator drift, Strength 5/5 in BUE, Atrophy of calf muscles > Quards BLE with foot drop.    Sens: Intact to light touch / PP   Reflexes: BJ 2+, BR 2+, KJ 2 0, AJ 0, downgoing toes b/l  Coord:  No FNFA,  Gait/Balance: Paraplegia- WC bound      Labs:   02-05    136  |  108  |  66<H>  ----------------------------<  90  3.6   |  19<L>  |  4.37<H>    Ca    7.7<L>      05 Feb 2024 07:14    TPro  6.4  /  Alb  2.5<L>  /  TBili  0.3  /  DBili  x   /  AST  7<L>  /  ALT  9<L>  /  AlkPhos  86  02-05 02-05 Chol 153 LDL -- HDL 36<L> Trig 109                          9.5    10.68 )-----------( 238      ( 05 Feb 2024 07:14 )             30.1       Radiology:  < from: CT Maxillofacial w/ IV Cont (02.04.24 @ 15:45) >  IMPRESSION:    Extensive nasal and perinasal soft tissue swelling is noted. Soft tissue   swelling also involves the left preseptal periorbital soft tissues,   without evidence for post septal extension at this time. This most likely   reflects a soft tissue infectious phlegmon-cellulitis, without focal   abscess collection at this time.    Both maxillary sinuses are severely opacified. The secretions demonstrate   increased density on the right, which may reflect chronicity-inspissation   versus chronic fungal sinus disease. The walls of the maxillary sinuses   are mildly thickened suggesting a chronic sinusitis component.    A high-grade stenosis involves the origin of the left internal carotid   artery, likely measuring greater than 80% via NASCET criteria. Calcified   plaque and moderate stenosis involves the origin of the right internal   carotid artery. Correlation with carotid ultrasound is recommended.

## 2024-02-05 NOTE — CONSULT NOTE ADULT - SUBJECTIVE AND OBJECTIVE BOX
Patient is a 73y old  Male who presents with a chief complaint of L facial swelling. (05 Feb 2024 09:30)    HPI:  74 y/o M w/ PMH of DM2, HTN, polio, CKD IV, BPH, pulmonary HTN, p/w L facial swelling. Swelling started 3 days ago, and has gotten progressively worse. C/o pain as well. Patient also has discomfort in his nose. Patient took a dose of amoxicillin outpatient. Denies fever, chills, cough, runny nose, sore throat. Also c/p pain in L side of nose. Patient states that he is suppose to have a suprapubic catheter placed soon. Patient states he has urinated since coming to ED. Started on IV vancomycin/zosyn.      PSH: R hip surgery, shoulder surgeries, L tibia ORIF   PMH: as above    Meds: per reconciliation sheet, noted below  MEDICATIONS  (STANDING):  amLODIPine   Tablet 5 milliGRAM(s) Oral daily  atorvastatin 40 milliGRAM(s) Oral at bedtime  dextrose 5%. 1000 milliLiter(s) (50 mL/Hr) IV Continuous <Continuous>  dextrose 5%. 1000 milliLiter(s) (100 mL/Hr) IV Continuous <Continuous>  dextrose 50% Injectable 12.5 Gram(s) IV Push once  dextrose 50% Injectable 25 Gram(s) IV Push once  dextrose 50% Injectable 25 Gram(s) IV Push once  glucagon  Injectable 1 milliGRAM(s) IntraMuscular once  hydrALAZINE Injectable 5 milliGRAM(s) IV Push once  influenza  Vaccine (HIGH DOSE) 0.7 milliLiter(s) IntraMuscular once  insulin lispro (ADMELOG) corrective regimen sliding scale   SubCutaneous every 6 hours  piperacillin/tazobactam IVPB.. 3.375 Gram(s) IV Intermittent every 12 hours    Allergies    No Known Allergies    Intolerances      Social: no smoking, no alcohol, no illegal drugs; no recent travel, no exposure to TB  FAMILY HISTORY:  No pertinent family history in first degree relatives       no history of premature cardiovascular disease in first degree relatives    ROS: the patient denies fever, no chills, no HA, no dizziness, no sore throat, no blurry vision, no CP, no palpitations, no SOB, no cough, no abdominal pain, no diarrhea, no N/V, no dysuria, no leg pain, no claudication, no rash, no joint aches, no rectal pain or bleeding, no night sweats    All other systems reviewed and are negative    Vital Signs Last 24 Hrs  T(C): 36.7 (05 Feb 2024 08:59), Max: 37.3 (04 Feb 2024 20:24)  T(F): 98 (05 Feb 2024 08:59), Max: 99.1 (04 Feb 2024 20:24)  HR: 89 (05 Feb 2024 08:59) (80 - 89)  BP: 176/77 (05 Feb 2024 08:59) (161/75 - 187/64)  BP(mean): 95 (04 Feb 2024 20:24) (95 - 95)  RR: 18 (05 Feb 2024 00:00) (18 - 18)  SpO2: 99% (05 Feb 2024 08:59) (98% - 100%)    Parameters below as of 05 Feb 2024 08:59  Patient On (Oxygen Delivery Method): room air      Daily     Daily     PE:  Constitutional: NAD L periorbital/L facial edema tenderness   HEENT: NC/AT, EOMI, PERRLA, conjunctivae clear; ears and nose atraumatic; pharynx benign  Neck: supple; thyroid not palpable  Back: no tenderness  Respiratory: respiratory effort normal; clear to auscultation  Cardiovascular: S1S2 regular, no murmurs  Abdomen: soft, not tender, not distended, positive BS; liver and spleen WNL  Genitourinary: no suprapubic tenderness  Lymphatic: no LN palpable  Musculoskeletal: no muscle tenderness, no joint swelling or tenderness  Extremities: no pedal edema  Neurological/ Psychiatric: AxOx3, Judgement and insight normal;  moving all extremities  Skin: no rashes; no palpable lesions    Labs: all available labs reviewed                        9.5    10.68 )-----------( 238      ( 05 Feb 2024 07:14 )             30.1     02-05    136  |  108  |  66<H>  ----------------------------<  90  3.6   |  19<L>  |  4.37<H>    Ca    7.7<L>      05 Feb 2024 07:14    TPro  6.4  /  Alb  2.5<L>  /  TBili  0.3  /  DBili  x   /  AST  7<L>  /  ALT  9<L>  /  AlkPhos  86  02-05     LIVER FUNCTIONS - ( 05 Feb 2024 07:14 )  Alb: 2.5 g/dL / Pro: 6.4 gm/dL / ALK PHOS: 86 U/L / ALT: 9 U/L / AST: 7 U/L / GGT: x           Urinalysis Basic - ( 05 Feb 2024 07:14 )    Color: x / Appearance: x / SG: x / pH: x  Gluc: 90 mg/dL / Ketone: x  / Bili: x / Urobili: x   Blood: x / Protein: x / Nitrite: x   Leuk Esterase: x / RBC: x / WBC x   Sq Epi: x / Non Sq Epi: x / Bacteria: x          Radiology: all available radiological tests reviewed  < from: CT Maxillofacial w/ IV Cont (02.04.24 @ 15:45) >    ACC: [id number] EXAM:  CT MAXILLOFACIAL IC   ORDERED BY: ILSA MIRANDA     PROCEDURE DATE:  02/04/2024          INTERPRETATION:  History: Swelling of the left face, question facial   abscess near the nose.  73-year-old with past medical history for   diabetes, hypertension, polio presents emergency department with left   facial swelling. Patient started with symptoms approximately 3 days ago.   Patient noticed the painful area left nose. Now patient with more   swelling left side of face. Patient did take some amoxicillin at home   which helped but did not take last night and now swelling is worse today.    Description: A postcontrast CT scan of the maxillofacial region was   performed.    No prior maxillofacial imaging study was available for comparison at this   Medical Center.    Axial images with coronal and sagittal reformatted series were obtained.    90 cc intravenous Omnipaque 350 contrast was administered.    Extensive nasal and perinasal soft tissue swelling is noted. Soft tissue   swelling also involves the left preseptal periorbital soft tissues,   without evidence for post septal extension at this time. This most likely   reflects a soft tissue infectious phlegmon-cellulitis, without focal   abscess collection at this time.    Both maxillary sinuses are severely opacified. The secretions demonstrate   increased density on the right, which may reflect chronicity-inspissation   versus chronic fungal sinus disease. The walls of the maxillary sinuses   are mildly thickened suggesting a chronic sinusitis component. Mild   mucosal thickening involves the ethmoid sinuses. The sphenoid sinus is   overall well aerated.    Bilateral nasal bone deformities are noted which may reflect old   fractures (there is no submitted clinicalhistory of recent facial   trauma).    A high-grade stenosis involves the origin of the left internal carotid   artery, likely measuring greater than 80% via NASCET criteria. Calcified   plaque and moderate stenosis involves the origin of the right internal   carotid artery. Correlation with carotid ultrasound is recommended.    Impacted horizontally oriented wisdom teeth involve the bilateral   maxilla-floor of the maxillary sinuses.    IMPRESSION:    Extensive nasal and perinasal soft tissue swelling is noted. Soft tissue   swelling also involves the left preseptal periorbital soft tissues,   without evidence for post septal extension at this time. This most likely   reflects a soft tissue infectious phlegmon-cellulitis, without focal   abscess collection at this time.    Both maxillary sinuses are severely opacified. The secretions demonstrate   increased density on the right, which may reflect chronicity-inspissation   versus chronic fungal sinus disease. The walls of the maxillary sinuses   are mildly thickened suggesting a chronic sinusitis component.    A high-grade stenosis involves the origin of the left internal carotid   artery, likely measuring greater than 80% via NASCET criteria. Calcified   plaque and moderate stenosis involves the origin of the right internal   carotid artery. Correlation with carotid ultrasound is recommended.      Advanced directives addressed: full resuscitation

## 2024-02-05 NOTE — CONSULT NOTE ADULT - SUBJECTIVE AND OBJECTIVE BOX
73M w/ Polio(uses wheelchair for ambulation), initially presented with 3 days of Lt facial swelling. Patient admitted to medicine for cellulitis. Vascular surgery team consulted for CTA findings of 80% stenosis of Lt ICA and moderate stenosis of Rt ICA. US carotid duplex pending. Patient down in the OR for suprapubic catheter placement, thus not able to examine.       PMHx: DM2, HTN, polio(uses wheelchair for ambulation), CKD IV, BPH, pulmonary HTN,  PSHx: R hip surgery, shoulder surgeries, L tibia ORIF   ALL: NKDA        ROS: Negative except written above    PHYSICAL EXAM: PENDING  - GENERAL: No acute distress.  - EYES: EOMI. Anicteric.  - HENT: Moist mucous membranes. No scleral icterus. No cervical lymphadenopathy.  - LUNGS:  No accessory muscle use.  - CARDIOVASCULAR: Regular rate and rhythm.   - ABDOMEN: Soft, non-tender and non-distended. No palpable masses.  - EXTREMITIES: No edema. Non-tender.  - SKIN: No rashes or lesions. Warm.  - NEUROLOGIC: No focal neurological deficits. CN II-XII grossly intact, but not individually tested.  - PSYCHIATRIC: Cooperative. Appropriate mood and affect.        Chief complaint:      PMHx:  Polio    Diabetes    Hypertension        PSHx:  No significant past surgical history        FHx:  No pertinent family history in first degree relatives        Vitals:  T(C): 36.3 (02-05 @ 15:36), Max: 37.3 (02-04 @ 20:24)  HR: 86 (02-05 @ 15:36) (80 - 89)  BP: 155/75 (02-05 @ 15:36) (155/75 - 187/64)  RR: 18 (02-05 @ 15:36) (18 - 18)  SpO2: 98% (02-05 @ 15:36) (98% - 100%)      I&Os    .    Labs:  02-05 @ 07:14                    9.5  CBC: 10.68>)-------(<238                     30.1                 136 | 108 | 66    CMP:  ----------------------< 90               3.6 | 19 | 4.37                      Ca:7.7  Phos:-  Mg:-               0.3|      |7        LFTs:  ------|86|-----             -|      |-  02-04 @ 13:47                    10.5  CBC: 13.39>)-------(<230                     32.9                 137 | 107 | 66    CMP:  ----------------------< 95               4.0 | 21 | 4.41                      Ca:8.2  Phos:-  Mg:-               0.4|      |10        LFTs:  ------|101|-----             -|      |-        Cultures:    Culture - Blood (collected 02-04-24 @ 13:47)  Source: .Blood None  Preliminary Report (02-05-24 @ 19:02):    No growth at 24 hours    Culture - Blood (collected 02-04-24 @ 13:47)  Source: .Blood None  Preliminary Report (02-05-24 @ 19:02):    No growth at 24 hours          Current Inpatient Medications:  acetaminophen     Tablet .. 650 milliGRAM(s) Oral every 6 hours PRN  amLODIPine   Tablet 5 milliGRAM(s) Oral daily  atorvastatin 40 milliGRAM(s) Oral at bedtime  dextrose 5%. 1000 milliLiter(s) (50 mL/Hr) IV Continuous <Continuous>  dextrose 5%. 1000 milliLiter(s) (100 mL/Hr) IV Continuous <Continuous>  dextrose 50% Injectable 12.5 Gram(s) IV Push once  dextrose 50% Injectable 25 Gram(s) IV Push once  dextrose 50% Injectable 25 Gram(s) IV Push once  dextrose Oral Gel 15 Gram(s) Oral once PRN  doxycycline IVPB 100 milliGRAM(s) IV Intermittent every 12 hours  glucagon  Injectable 1 milliGRAM(s) IntraMuscular once  influenza  Vaccine (HIGH DOSE) 0.7 milliLiter(s) IntraMuscular once  insulin lispro (ADMELOG) corrective regimen sliding scale   SubCutaneous every 6 hours  piperacillin/tazobactam IVPB.. 3.375 Gram(s) IV Intermittent every 12 hours            < from: CT Maxillofacial w/ IV Cont (02.04.24 @ 15:45) >  Extensive nasal and perinasal soft tissue swelling is noted. Soft tissue   swelling also involves the left preseptal periorbital soft tissues,   without evidence for post septal extension at this time. This most likely   reflects a soft tissue infectious phlegmon-cellulitis, without focal   abscess collection at this time.    Both maxillary sinuses are severely opacified. The secretions demonstrate   increased density on the right, which may reflect chronicity-inspissation   versus chronic fungal sinus disease. The walls of the maxillary sinuses   are mildly thickened suggesting a chronic sinusitis component.    A high-grade stenosis involves the origin of the left internal carotid   artery, likely measuring greater than 80% via NASCET criteria. Calcified   plaque and moderate stenosis involves the origin of the right internal   carotid artery. Correlation with carotid ultrasound is recommended.    < end of copied text >

## 2024-02-05 NOTE — CONSULT NOTE ADULT - SUBJECTIVE AND OBJECTIVE BOX
REASON FOR CONSULT: ***    CONSULT REQUESTED BY: ***    Patient is a 73y old  Male who presents with a chief complaint of L facial swelling. (05 Feb 2024 21:36)      BRIEF HOSPITAL COURSE: ***    Events last 24 hours: ***    PAST MEDICAL & SURGICAL HISTORY:  Polio      Diabetes      Hypertension      No significant past surgical history        Allergies    No Known Allergies    Intolerances      FAMILY HISTORY:  No pertinent family history in first degree relatives        Review of Systems:  CONSTITUTIONAL: No fever, chills, or fatigue  EYES: No eye pain, visual disturbances, or discharge  ENMT:  No difficulty hearing, tinnitus, vertigo; No sinus or throat pain  NECK: No pain or stiffness  RESPIRATORY: No cough, wheezing, chills or hemoptysis; No shortness of breath  CARDIOVASCULAR: No chest pain, palpitations, dizziness, or leg swelling  GASTROINTESTINAL: No abdominal or epigastric pain. No nausea, vomiting, or hematemesis; No diarrhea or constipation. No melena or hematochezia.  GENITOURINARY: No dysuria, frequency, hematuria, or incontinence  NEUROLOGICAL: No headaches, memory loss, loss of strength, numbness, or tremors  SKIN: No itching, burning, rashes, or lesions   MUSCULOSKELETAL: No joint pain or swelling; No muscle, back, or extremity pain  PSYCHIATRIC: No depression, anxiety, mood swings, or difficulty sleeping      Medications:  doxycycline IVPB 100 milliGRAM(s) IV Intermittent every 12 hours  piperacillin/tazobactam IVPB.. 3.375 Gram(s) IV Intermittent every 12 hours    amLODIPine   Tablet 5 milliGRAM(s) Oral daily      acetaminophen     Tablet .. 650 milliGRAM(s) Oral every 6 hours PRN  HYDROmorphone  Injectable 0.5 milliGRAM(s) IV Push every 4 hours PRN            atorvastatin 40 milliGRAM(s) Oral at bedtime  dextrose 50% Injectable 12.5 Gram(s) IV Push once  dextrose 50% Injectable 25 Gram(s) IV Push once  dextrose 50% Injectable 25 Gram(s) IV Push once  dextrose Oral Gel 15 Gram(s) Oral once PRN  glucagon  Injectable 1 milliGRAM(s) IntraMuscular once  insulin lispro (ADMELOG) corrective regimen sliding scale   SubCutaneous every 6 hours    dextrose 5%. 1000 milliLiter(s) IV Continuous <Continuous>  dextrose 5%. 1000 milliLiter(s) IV Continuous <Continuous>  sodium chloride 0.9%. 1000 milliLiter(s) IV Continuous <Continuous>    influenza  Vaccine (HIGH DOSE) 0.7 milliLiter(s) IntraMuscular once              ICU Vital Signs Last 24 Hrs  T(C): 36.6 (05 Feb 2024 22:30), Max: 36.7 (05 Feb 2024 06:38)  T(F): 97.8 (05 Feb 2024 22:30), Max: 98.1 (05 Feb 2024 06:38)  HR: 83 (05 Feb 2024 23:00) (79 - 89)  BP: 160/78 (05 Feb 2024 23:00) (155/75 - 187/64)  BP(mean): --  ABP: --  ABP(mean): --  RR: 17 (05 Feb 2024 23:00) (14 - 23)  SpO2: 98% (05 Feb 2024 23:00) (98% - 100%)    O2 Parameters below as of 05 Feb 2024 23:00  Patient On (Oxygen Delivery Method): room air          Vital Signs Last 24 Hrs  T(C): 36.6 (05 Feb 2024 22:30), Max: 36.7 (05 Feb 2024 06:38)  T(F): 97.8 (05 Feb 2024 22:30), Max: 98.1 (05 Feb 2024 06:38)  HR: 83 (05 Feb 2024 23:00) (79 - 89)  BP: 160/78 (05 Feb 2024 23:00) (155/75 - 187/64)  BP(mean): --  RR: 17 (05 Feb 2024 23:00) (14 - 23)  SpO2: 98% (05 Feb 2024 23:00) (98% - 100%)    Parameters below as of 05 Feb 2024 23:00  Patient On (Oxygen Delivery Method): room air            I&O's Detail    05 Feb 2024 07:01  -  05 Feb 2024 23:49  --------------------------------------------------------  IN:    Lactated Ringers: 125 mL  Total IN: 125 mL    OUT:    Indwelling Catheter - Urethral (mL): 250 mL  Total OUT: 250 mL    Total NET: -125 mL            LABS:                        9.5    10.68 )-----------( 238      ( 05 Feb 2024 07:14 )             30.1     02-05    136  |  108  |  66<H>  ----------------------------<  90  3.6   |  19<L>  |  4.37<H>    Ca    7.7<L>      05 Feb 2024 07:14    TPro  6.4  /  Alb  2.5<L>  /  TBili  0.3  /  DBili  x   /  AST  7<L>  /  ALT  9<L>  /  AlkPhos  86  02-05          CAPILLARY BLOOD GLUCOSE      POCT Blood Glucose.: 110 mg/dL (05 Feb 2024 21:24)    PT/INR - ( 05 Feb 2024 07:14 )   PT: 13.1 sec;   INR: 1.16 ratio         PTT - ( 05 Feb 2024 07:14 )  PTT:32.2 sec  Urinalysis Basic - ( 05 Feb 2024 07:14 )    Color: x / Appearance: x / SG: x / pH: x  Gluc: 90 mg/dL / Ketone: x  / Bili: x / Urobili: x   Blood: x / Protein: x / Nitrite: x   Leuk Esterase: x / RBC: x / WBC x   Sq Epi: x / Non Sq Epi: x / Bacteria: x      CULTURES:  Culture Results:   No growth at 24 hours (02-04 @ 13:47)  Culture Results:   No growth at 24 hours (02-04 @ 13:47)      Physical Examination:    General: No acute distress.  Alert, oriented, interactive, nonfocal    HEENT: Pupils equal, reactive to light.  Symmetric.    PULM: Clear to auscultation bilaterally, no significant sputum production    CVS: Regular rate and rhythm, no murmurs, rubs, or gallops    ABD: Soft, nondistended, nontender, normoactive bowel sounds, no masses    EXT: No edema, nontender    SKIN: Warm and well perfused, no rashes noted.    RADIOLOGY: ***    Time spent on this patient encounter, which includes documenting this note in the electronic medical record, was 57 minutes including assessing the presenting problems with associated risks, reviewing the medical record to prepare for the encounter, and meeting face to face with patient to obtain additional history. I have also performed an appropriate physical exam, made interventions listed and ordered and interpreted appropriate diagnostic studies as documented. To improve communication and patient saftey, I have coordinated care with the multidisciplinary team including the bedside nurse, appropriate attending of record and consultants as needed. Date of entry of note is equal to date of services rendered.       Patient is a 73y old  Male who presents with a chief complaint of L facial swelling. (05 Feb 2024 21:36)      BRIEF HOSPITAL COURSE:   73M with PMHx DM, HTN, polio, CKD, BPH, pHTN who presented to ED with L facial swelling x 3days with associated pain. Pt reported that he was supposed to have suprapubic catheter placement due to obstructive uropathy. Pt was found to have periorbital cellulitis, CTA/P showing significant bilateral hydro with obstructive uropathy. Pt was admitted to medicine consulted by ID started on IV abx. During his stay also consulted by neprho and urology who felt SPT placement was needed. Pt taken to OR today for SPT, procedure complicated by rectal injury with trocar. Colorectal consulted intraop. Underwent laparscopy and proctosigmoidoscopy, no intrapertioneal injury noted. Underwent lap diverting loop colostomy. ICU asked to consult postop.         PAST MEDICAL & SURGICAL HISTORY:  Polio      Diabetes      Hypertension      No significant past surgical history        Allergies    No Known Allergies    Intolerances      FAMILY HISTORY:  No pertinent family history in first degree relatives        Review of Systems:  12 pt ROS negative unless otherwise stated above      Medications:  doxycycline IVPB 100 milliGRAM(s) IV Intermittent every 12 hours  piperacillin/tazobactam IVPB.. 3.375 Gram(s) IV Intermittent every 12 hours    amLODIPine   Tablet 5 milliGRAM(s) Oral daily      acetaminophen     Tablet .. 650 milliGRAM(s) Oral every 6 hours PRN  HYDROmorphone  Injectable 0.5 milliGRAM(s) IV Push every 4 hours PRN            atorvastatin 40 milliGRAM(s) Oral at bedtime  dextrose 50% Injectable 12.5 Gram(s) IV Push once  dextrose 50% Injectable 25 Gram(s) IV Push once  dextrose 50% Injectable 25 Gram(s) IV Push once  dextrose Oral Gel 15 Gram(s) Oral once PRN  glucagon  Injectable 1 milliGRAM(s) IntraMuscular once  insulin lispro (ADMELOG) corrective regimen sliding scale   SubCutaneous every 6 hours    dextrose 5%. 1000 milliLiter(s) IV Continuous <Continuous>  dextrose 5%. 1000 milliLiter(s) IV Continuous <Continuous>  sodium chloride 0.9%. 1000 milliLiter(s) IV Continuous <Continuous>    influenza  Vaccine (HIGH DOSE) 0.7 milliLiter(s) IntraMuscular once              ICU Vital Signs Last 24 Hrs  T(C): 36.6 (05 Feb 2024 22:30), Max: 36.7 (05 Feb 2024 06:38)  T(F): 97.8 (05 Feb 2024 22:30), Max: 98.1 (05 Feb 2024 06:38)  HR: 83 (05 Feb 2024 23:00) (79 - 89)  BP: 160/78 (05 Feb 2024 23:00) (155/75 - 187/64)  BP(mean): --  ABP: --  ABP(mean): --  RR: 17 (05 Feb 2024 23:00) (14 - 23)  SpO2: 98% (05 Feb 2024 23:00) (98% - 100%)    O2 Parameters below as of 05 Feb 2024 23:00  Patient On (Oxygen Delivery Method): room air          Vital Signs Last 24 Hrs  T(C): 36.6 (05 Feb 2024 22:30), Max: 36.7 (05 Feb 2024 06:38)  T(F): 97.8 (05 Feb 2024 22:30), Max: 98.1 (05 Feb 2024 06:38)  HR: 83 (05 Feb 2024 23:00) (79 - 89)  BP: 160/78 (05 Feb 2024 23:00) (155/75 - 187/64)  BP(mean): --  RR: 17 (05 Feb 2024 23:00) (14 - 23)  SpO2: 98% (05 Feb 2024 23:00) (98% - 100%)    Parameters below as of 05 Feb 2024 23:00  Patient On (Oxygen Delivery Method): room air            I&O's Detail    05 Feb 2024 07:01  -  05 Feb 2024 23:49  --------------------------------------------------------  IN:    Lactated Ringers: 125 mL  Total IN: 125 mL    OUT:    Indwelling Catheter - Urethral (mL): 250 mL  Total OUT: 250 mL    Total NET: -125 mL            LABS:                        9.5    10.68 )-----------( 238      ( 05 Feb 2024 07:14 )             30.1     02-05    136  |  108  |  66<H>  ----------------------------<  90  3.6   |  19<L>  |  4.37<H>    Ca    7.7<L>      05 Feb 2024 07:14    TPro  6.4  /  Alb  2.5<L>  /  TBili  0.3  /  DBili  x   /  AST  7<L>  /  ALT  9<L>  /  AlkPhos  86  02-05          CAPILLARY BLOOD GLUCOSE      POCT Blood Glucose.: 110 mg/dL (05 Feb 2024 21:24)    PT/INR - ( 05 Feb 2024 07:14 )   PT: 13.1 sec;   INR: 1.16 ratio         PTT - ( 05 Feb 2024 07:14 )  PTT:32.2 sec  Urinalysis Basic - ( 05 Feb 2024 07:14 )    Color: x / Appearance: x / SG: x / pH: x  Gluc: 90 mg/dL / Ketone: x  / Bili: x / Urobili: x   Blood: x / Protein: x / Nitrite: x   Leuk Esterase: x / RBC: x / WBC x   Sq Epi: x / Non Sq Epi: x / Bacteria: x      CULTURES:  Culture Results:   No growth at 24 hours (02-04 @ 13:47)  Culture Results:   No growth at 24 hours (02-04 @ 13:47)      Physical Examination:    General: No acute distress.  Alert, oriented x 3, interactive, nonfocal    HEENT: Pupils equal, reactive to light.  Symmetric.    PULM: Clear to auscultation bilaterally    CVS: Regular rate and rhythm    ABD: Softly distended, hypoactive, LLQ ostomy site pink and viable, surgical port dressing site c/d/i, some brooke incisional tenderness    EXT: No edema, SCDs in place    SKIN: Warm and well perfused    RADIOLOGY:   ACC: [id number] EXAM:  CT ABDOMEN AND PELVIS   ORDERED BY: ILSA MIRANDA     PROCEDURE DATE:  02/04/2024          INTERPRETATION:  CLINICAL INFORMATION: Elevated creatinine, urinary   retention    COMPARISON: 7/26/2018    CONTRAST/COMPLICATIONS:  IV Contrast: NONE  Oral Contrast: NONE  Complications: None reported at time of study completion    PROCEDURE:  CT of the Abdomen and Pelvis was performed.  Sagittal and coronal reformats were performed.    FINDINGS:  LOWER CHEST: Within normal limits.    LIVER: Within normal limits.  BILE DUCTS: Normal caliber.  GALLBLADDER: Cholelithiasis.  SPLEEN: Within normal limits.  PANCREAS: Within normal limits.  ADRENALS: Within normal limits.  KIDNEYS/URETERS: Marked bilateral hydronephrosis, as noted previously, to   the level of the bladder, increased in extent since the previous study   with increased distention and dilatation of the ureters. Increased size   of slightly hyperattenuating lesion from the upper pole of the right   kidney, measuring 3.8 cm, compared to 1.6 cm previously. Smaller,   bilateral simple cysts.    BLADDER: Distended with trabeculations  REPRODUCTIVE ORGANS: Enlarged prostate gland with seeds in place    BOWEL: No bowel obstruction.  PERITONEUM: No ascites.  VESSELS: Within normal limits.  RETROPERITONEUM/LYMPH NODES: No lymphadenopathy.  ABDOMINAL WALL: Within normal limits.  BONES: Degenerative changes. Bilateral marked muscular atrophy in the hip   joints.    IMPRESSION:  1.  Marked bilateral hydronephrosis to the level of the bladder,   increased in severity and extent since the previous study.  2.  Increased size of a hyperattenuating exophytic right renal lesion,   possible hyper proteinaceous cyst. Would recommend elective repeat   ultrasound.        --- End of Report ---            EFE GODDARD MD; Attending Radiologist  This document has been electronically signed. Feb 4 2024  3:53PM    ACC: [id number] EXAM:  CT MAXILLOFACIAL IC   ORDERED BY: ILSA MIRANDA     PROCEDURE DATE:  02/04/2024          INTERPRETATION:  History: Swelling of the left face, question facial   abscess near the nose.  73-year-old with past medical history for   diabetes, hypertension, polio presents emergency department with left   facial swelling. Patient started with symptoms approximately 3 days ago.   Patient noticed the painful area left nose. Now patient with more   swelling left side of face. Patient did take some amoxicillin at home   which helped but did not take last night and now swelling is worse today.    Description: A postcontrast CT scan of the maxillofacial region was   performed.    No prior maxillofacial imaging study was available for comparison at this   Medical Center.    Axial images with coronal and sagittal reformatted series were obtained.    90 cc intravenous Omnipaque 350 contrast was administered.    Extensive nasal and perinasal soft tissue swelling is noted. Soft tissue   swelling also involves the left preseptal periorbital soft tissues,   without evidence for post septal extension at this time. This most likely   reflects a soft tissue infectious phlegmon-cellulitis, without focal   abscess collection at this time.    Both maxillary sinuses are severely opacified. The secretions demonstrate   increased density on the right, which may reflect chronicity-inspissation   versus chronic fungal sinus disease. The walls of the maxillary sinuses   are mildly thickened suggesting a chronic sinusitis component. Mild   mucosal thickening involves the ethmoid sinuses. The sphenoid sinus is   overall well aerated.    Bilateral nasal bone deformities are noted which may reflect old   fractures (there is no submitted clinical history of recent facial   trauma).    A high-grade stenosis involves the origin of the left internal carotid   artery, likely measuring greater than 80% via NASCET criteria. Calcified   plaque and moderate stenosis involves the origin of the right internal   carotid artery. Correlation with carotid ultrasound is recommended.    Impacted horizontally oriented wisdom teeth involve the bilateral   maxilla-floor of the maxillary sinuses.    IMPRESSION:    Extensive nasal and perinasal soft tissue swelling is noted. Soft tissue   swelling also involves the left preseptal periorbital soft tissues,   without evidence for post septal extension at this time. This most likely   reflects a soft tissue infectious phlegmon-cellulitis, without focal   abscess collection at this time.    Both maxillary sinuses are severely opacified. The secretions demonstrate   increased density on the right, which may reflect chronicity-inspissation   versus chronic fungal sinus disease. The walls of the maxillary sinuses   are mildly thickened suggesting a chronic sinusitis component.    A high-grade stenosis involves the origin of the left internal carotid   artery, likely measuring greater than 80% via NASCET criteria. Calcified   plaque and moderate stenosis involves the origin of the right internal   carotid artery. Correlation with carotid ultrasound is recommended.    --- End of Report ---            TANI BERMUDEZ MD; Attending Radiologist  This document has been electronically signed. Feb 4 2024  4:02PM    Time spent on this patient encounter, which includes documenting this note in the electronic medical record, was 57 minutes including assessing the presenting problems with associated risks, reviewing the medical record to prepare for the encounter, and meeting face to face with patient to obtain additional history. I have also performed an appropriate physical exam, made interventions listed and ordered and interpreted appropriate diagnostic studies as documented. To improve communication and patient saftey, I have coordinated care with the multidisciplinary team including the bedside nurse, appropriate attending of record and consultants as needed. Date of entry of note is equal to date of services rendered.

## 2024-02-05 NOTE — PROGRESS NOTE ADULT - SUBJECTIVE AND OBJECTIVE BOX
patient seen and examined  NPO for SPC placement  comfortable denies complaints    1. Carotid dopplers pending  2. on V and Pip Jorge  3. Aspirin on holdfor now    Review of Systems:  General:denies fever chills, headache, weakness  HEENT: denies blurry vision,diffculty swallowing, difficulty hearing, tinnitus  Cardiovascular: denies chest pain  ,palpitations  Pulmonary:denies shortness of breath, cough, wheezing, hemoptysis  Gastrointestinal: denies abdominal pain, constipation, diarrhea,nausea , vomiting, hematochezia  : denies hematuria, dysuria, or incontinence  Neurological: denies weakness, numbness , tingling, dizziness, tremors  MSK: denies muscle pain, difficulty ambulating, swelling, back pain  skin: denies skin rash, itching, burning, or  skin lesions  Psychiatrical: denies mood disturbances, anxierty, feeling depressed, depression , or difficulty sleeping    Objective:  Vitals  T(C): 36.7 (02-05-24 @ 08:59), Max: 37.3 (02-04-24 @ 20:24)  HR: 89 (02-05-24 @ 08:59) (80 - 89)  BP: 176/77 (02-05-24 @ 08:59) (161/75 - 187/64)  RR: 18 (02-05-24 @ 00:00) (18 - 18)  SpO2: 99% (02-05-24 @ 08:59) (98% - 100%)    Physical Exam:  General: comfortable, no acute distress  HEENT: Atraumatic, no LAD, trachea midline, PERRLA  Cardiovascular: normal s1s2, no murmurs, gallops or fricition rubs  Pulmonary: clear to ausculation Bilaterally, no wheezing , rhonchi  Gastrointestinal: soft non tender non distended, no masses felt, no organomegally  Muscloskeletal: no lower extremity edema, intact bilateral lower extremity pulses  Neurological: CN II-12 intact. No focal weakness  Psychiatrical: normal mood, cooperative  SKIN: no rash, lesions or ulcers    Labs:                          9.5    10.68 )-----------( 238      ( 05 Feb 2024 07:14 )             30.1     02-05    136  |  108  |  66<H>  ----------------------------<  90  3.6   |  19<L>  |  4.37<H>    Ca    7.7<L>      05 Feb 2024 07:14    TPro  6.4  /  Alb  2.5<L>  /  TBili  0.3  /  DBili  x   /  AST  7<L>  /  ALT  9<L>  /  AlkPhos  86  02-05    LIVER FUNCTIONS - ( 05 Feb 2024 07:14 )  Alb: 2.5 g/dL / Pro: 6.4 gm/dL / ALK PHOS: 86 U/L / ALT: 9 U/L / AST: 7 U/L / GGT: x           PT/INR - ( 05 Feb 2024 07:14 )   PT: 13.1 sec;   INR: 1.16 ratio         PTT - ( 05 Feb 2024 07:14 )  PTT:32.2 sec      Active Medications  MEDICATIONS  (STANDING):  amLODIPine   Tablet 5 milliGRAM(s) Oral daily  atorvastatin 40 milliGRAM(s) Oral at bedtime  dextrose 5%. 1000 milliLiter(s) (50 mL/Hr) IV Continuous <Continuous>  dextrose 5%. 1000 milliLiter(s) (100 mL/Hr) IV Continuous <Continuous>  dextrose 50% Injectable 12.5 Gram(s) IV Push once  dextrose 50% Injectable 25 Gram(s) IV Push once  dextrose 50% Injectable 25 Gram(s) IV Push once  glucagon  Injectable 1 milliGRAM(s) IntraMuscular once  hydrALAZINE Injectable 5 milliGRAM(s) IV Push once  influenza  Vaccine (HIGH DOSE) 0.7 milliLiter(s) IntraMuscular once  insulin lispro (ADMELOG) corrective regimen sliding scale   SubCutaneous every 6 hours  piperacillin/tazobactam IVPB.. 3.375 Gram(s) IV Intermittent every 12 hours    MEDICATIONS  (PRN):  acetaminophen     Tablet .. 650 milliGRAM(s) Oral every 6 hours PRN Mild Pain (1 - 3)  dextrose Oral Gel 15 Gram(s) Oral once PRN Blood Glucose LESS THAN 70 milliGRAM(s)/deciliter

## 2024-02-05 NOTE — PROGRESS NOTE ADULT - ASSESSMENT
74 y/o M w/ PMH of DM2, HTN, polio, CKD IV, BPH, pulmonary HTN, p/w L facial swelling    *L Periorbital cellulitis   -CT reports Extensive nasal and perinasal soft tissue swelling  +soft tissue swelling involves L pre-septal periorbital soft tissues without evidence for post septal extension.   -Vanco / Zosyn  -F/u blood cultures  -ID consult   -CT also reports that both maxillary sinuses are severely opacified w/ possible inspissation vs chronic fungal disease + chronic sinusitis -> will await recommendations from ID regarding possible anti-fungal meds    *High grade L internal carotid artery stenosis noted on CT  -Carotid U/S  -Neuro consult  AND VASCULAR  -Will need ASA if patient is not getting procedure  -Statin     *B/L hydronephrosis + Renal lesion + Suspect acute on CKD IV  -CT reports Marked B/L hydronephrosis increased in severity and extent.  Increased size of a hyperattenuating exophytic right renal lesion 3.8cm.   - consult  -NPO in case patient requires procedure in AM   -Nephro consult   -I/Os     *Hypertension  -Will start Norvasc 5mg PO daily   -Low salt diet     *Cholelithaisis  -F/u outpatient     *DM2  -Humalog ISS Q6H     *DVT ppx   -SCDs     >75 mins required for admission

## 2024-02-05 NOTE — CONSULT NOTE ADULT - SUBJECTIVE AND OBJECTIVE BOX
Colorectal surgery team called to evaluate patient in the OR      HPI as per chart:  74 y/o M w/ PMH of DM2, HTN, polio, CKD IV, BPH, pulmonary HTN, p/w L facial swelling. Swelling started 3 days ago, and has gotten progressively worse. C/o pain as well. Patient also has discomfort in his nose. Patient took a dose of amoxicillin outpatient. Denies fever, chills, cough, runny nose, sore throat. Also c/p pain in L side of nose. Patient states that he is suppose to have a suprapubic catheter placed soon. Patient states he has urinated since coming to ED       PSH: R hip surgery, shoulder surgeries, L tibia ORIF     Social Hx: Denies tobacco / etoh / drugs     Family Hx: Father - DM     Vitals:  T(C): 36.4 (02-05 @ 21:16), Max: 37.1 (02-04 @ 22:10)  HR: 83 (02-05 @ 21:31) (79 - 89)  BP: 159/73 (02-05 @ 21:31) (155/75 - 187/64)  RR: 14 (02-05 @ 21:31) (14 - 23)  SpO2: 100% (02-05 @ 21:31) (98% - 100%)      Physical Exam:  Unable to assess at this moment      02-05 @ 07:14                    9.5  CBC: 10.68>)-------(<238                     30.1                 136 | 108 | 66    CMP:  ----------------------< 90               3.6 | 19 | 4.37                      Ca:7.7  Phos:-  Mg:-               0.3|      |7        LFTs:  ------|86|-----             -|      |-  02-04 @ 13:47                    10.5  CBC: 13.39>)-------(<230                     32.9                 137 | 107 | 66    CMP:  ----------------------< 95               4.0 | 21 | 4.41                      Ca:8.2  Phos:-  Mg:-               0.4|      |10        LFTs:  ------|101|-----             -|      |-      Culture - Blood (collected 02-04-24 @ 13:47)  Source: .Blood None  Preliminary Report (02-05-24 @ 19:02):    No growth at 24 hours    Culture - Blood (collected 02-04-24 @ 13:47)  Source: .Blood None  Preliminary Report (02-05-24 @ 19:02):    No growth at 24 hours      Current Inpatient Medications:  acetaminophen     Tablet .. 650 milliGRAM(s) Oral every 6 hours PRN  amLODIPine   Tablet 5 milliGRAM(s) Oral daily  atorvastatin 40 milliGRAM(s) Oral at bedtime  dextrose 5%. 1000 milliLiter(s) (50 mL/Hr) IV Continuous <Continuous>  dextrose 5%. 1000 milliLiter(s) (100 mL/Hr) IV Continuous <Continuous>  dextrose 50% Injectable 12.5 Gram(s) IV Push once  dextrose 50% Injectable 25 Gram(s) IV Push once  dextrose 50% Injectable 25 Gram(s) IV Push once  dextrose Oral Gel 15 Gram(s) Oral once PRN  doxycycline IVPB 100 milliGRAM(s) IV Intermittent every 12 hours  fentaNYL    Injectable 50 MICROGram(s) IV Push every 15 minutes PRN  glucagon  Injectable 1 milliGRAM(s) IntraMuscular once  HYDROmorphone  Injectable 0.5 milliGRAM(s) IV Push every 10 minutes PRN  influenza  Vaccine (HIGH DOSE) 0.7 milliLiter(s) IntraMuscular once  insulin lispro (ADMELOG) corrective regimen sliding scale   SubCutaneous every 6 hours  lactated ringers. 1000 milliLiter(s) (125 mL/Hr) IV Continuous <Continuous>  ondansetron Injectable 4 milliGRAM(s) IV Push once PRN  oxyCODONE    IR 5 milliGRAM(s) Oral once PRN  piperacillin/tazobactam IVPB.. 3.375 Gram(s) IV Intermittent every 12 hours

## 2024-02-05 NOTE — H&P ADULT - HISTORY OF PRESENT ILLNESS
74 y/o M w/ PMH of DM2, HTN, polio, CKD IV, BPH, pulmonary HTN, p/w L facial swelling. Swelling started 3 days ago, and has gotten progressively worse. C/o pain as well. Denies fever, chills, cough, runny nose, sore throat. Also c/p pain in L side of nose. Patient states that he is suppose to have a suprapubic catheter placed soon. Patient states he has urinated since coming to ED       PSH: R hip surgery, shoulder surgeries, L tibia ORIF     Social Hx: Denies tobacco / etoh / drugs     Family Hx: Father - DM  72 y/o M w/ PMH of DM2, HTN, polio, CKD IV, BPH, pulmonary HTN, p/w L facial swelling. Swelling started 3 days ago, and has gotten progressively worse. C/o pain as well. Patient also has discomfort in his nose. Patient took a dose of amoxicillin outpatient. Denies fever, chills, cough, runny nose, sore throat. Also c/p pain in L side of nose. Patient states that he is suppose to have a suprapubic catheter placed soon. Patient states he has urinated since coming to ED       PSH: R hip surgery, shoulder surgeries, L tibia ORIF     Social Hx: Denies tobacco / etoh / drugs     Family Hx: Father - DM

## 2024-02-05 NOTE — CONSULT NOTE ADULT - SUBJECTIVE AND OBJECTIVE BOX
HPI:  74 y/o M w/ PMH of DM2, HTN, polio, CKD IV, BPH, pulmonary HTN, p/w L facial swelling. Swelling started 3 days ago, and has gotten progressively worse. C/o pain as well. Patient also has discomfort in his nose. Patient took a dose of amoxicillin outpatient. Denies fever, chills, cough, runny nose, sore throat. Also c/p pain in L side of nose. Patient states that he is suppose to have a suprapubic catheter placed soon. Patient states he has urinated since coming to ED       PSH: R hip surgery, shoulder surgeries, L tibia ORIF     Social Hx: Denies tobacco / etoh / drugs     Family Hx: Father - DM  (05 Feb 2024 00:30)    74 yo male with PMH as above admitted for periorbital cellulitis. Urology consulted for patient with b/l hydronephrosis with distended bladder on CT scan and worsening SONDRA. Patient seen at bedside, reports he follows up with a urologist and was going to have a suprapubic tube placed in a couple of weeks. Reports he has history of long standing urinary retention with indwelling mcconnell catheters in the past, reports having strictures and a lot of bleeding with urethral mcconnell catheters. Reports he refuses to have any urethral catheters placed due to his previous experiences. He denies any abd/flank pain, fevers, chills.     PAST MEDICAL & SURGICAL HISTORY:  Polio      Diabetes      Hypertension      No significant past surgical history          REVIEW OF SYSTEMS     All other review of systems neg, except as noted in HPI    MEDICATIONS  (STANDING):  amLODIPine   Tablet 5 milliGRAM(s) Oral daily  atorvastatin 40 milliGRAM(s) Oral at bedtime  dextrose 5%. 1000 milliLiter(s) (50 mL/Hr) IV Continuous <Continuous>  dextrose 5%. 1000 milliLiter(s) (100 mL/Hr) IV Continuous <Continuous>  dextrose 50% Injectable 12.5 Gram(s) IV Push once  dextrose 50% Injectable 25 Gram(s) IV Push once  dextrose 50% Injectable 25 Gram(s) IV Push once  glucagon  Injectable 1 milliGRAM(s) IntraMuscular once  influenza  Vaccine (HIGH DOSE) 0.7 milliLiter(s) IntraMuscular once  insulin lispro (ADMELOG) corrective regimen sliding scale   SubCutaneous every 6 hours  piperacillin/tazobactam IVPB.. 3.375 Gram(s) IV Intermittent every 12 hours  vancomycin  IVPB 1500 milliGRAM(s) IV Intermittent every 24 hours    MEDICATIONS  (PRN):  acetaminophen     Tablet .. 650 milliGRAM(s) Oral every 6 hours PRN Mild Pain (1 - 3)  dextrose Oral Gel 15 Gram(s) Oral once PRN Blood Glucose LESS THAN 70 milliGRAM(s)/deciliter      Allergies    No Known Allergies    Intolerances        SOCIAL HISTORY:    FAMILY HISTORY:  No pertinent family history in first degree relatives        Vital Signs Last 24 Hrs  T(C): 36.7 (05 Feb 2024 08:59), Max: 37.3 (04 Feb 2024 20:24)  T(F): 98 (05 Feb 2024 08:59), Max: 99.1 (04 Feb 2024 20:24)  HR: 89 (05 Feb 2024 08:59) (80 - 89)  BP: 176/77 (05 Feb 2024 08:59) (161/75 - 187/64)  BP(mean): 95 (04 Feb 2024 20:24) (95 - 111)  RR: 18 (05 Feb 2024 00:00) (18 - 18)  SpO2: 99% (05 Feb 2024 08:59) (98% - 100%)    Parameters below as of 05 Feb 2024 08:59  Patient On (Oxygen Delivery Method): room air        PHYSICAL EXAM:       General: No distress, No anxiety  VITALS  T(C): 36.7 (02-05-24 @ 08:59), Max: 37.3 (02-04-24 @ 20:24)  HR: 89 (02-05-24 @ 08:59) (80 - 89)  BP: 176/77 (02-05-24 @ 08:59) (161/75 - 187/64)  RR: 18 (02-05-24 @ 00:00) (18 - 18)  SpO2: 99% (02-05-24 @ 08:59) (98% - 100%)            Skin     : No jaundice   HEENT: Normocephalic, L periorbital erythema and swelling, No epistaxis  Lung    : No resp distress  Abdo:   : Soft, Non tender, No guarding, + distension   Back    : No CVAT b/l  Genitalia Male: No Mcconnell  Neuro   : A&Ox3          LABS:                        9.5    10.68 )-----------( 238      ( 05 Feb 2024 07:14 )             30.1     02-05    136  |  108  |  66<H>  ----------------------------<  90  3.6   |  19<L>  |  4.37<H>    Ca    7.7<L>      05 Feb 2024 07:14    TPro  6.4  /  Alb  2.5<L>  /  TBili  0.3  /  DBili  x   /  AST  7<L>  /  ALT  9<L>  /  AlkPhos  86  02-05    PT/INR - ( 05 Feb 2024 07:14 )   PT: 13.1 sec;   INR: 1.16 ratio         PTT - ( 05 Feb 2024 07:14 )  PTT:32.2 sec  Urinalysis Basic - ( 05 Feb 2024 07:14 )    Color: x / Appearance: x / SG: x / pH: x  Gluc: 90 mg/dL / Ketone: x  / Bili: x / Urobili: x   Blood: x / Protein: x / Nitrite: x   Leuk Esterase: x / RBC: x / WBC x   Sq Epi: x / Non Sq Epi: x / Bacteria: x        RADIOLOGY & ADDITIONAL STUDIES:< from: US Kidney and Bladder (02.04.24 @ 18:05) >    ACC: [id number] EXAM:  US KIDNEYS AND BLADDER   ORDERED BY: ALLI HAYES     PROCEDURE DATE:  02/04/2024          INTERPRETATION:  CLINICAL INFORMATION: Evaluate for hydro-    COMPARISON: 7/30/2018    TECHNIQUE: Sonography of the kidneys and bladder.    FINDINGS:  Right kidney: 13.1 cm. upper pole simple appearing cyst measuring 3.6 x   3.2 x 4.4 cm and severe hydronephrosis    Left kidney: 12.4 cm. severe hydronephrosis    Urinary bladder: Only the right ureteral jets were seen. Prevoid volume   is 1871 cc    IMPRESSION:  Right kidney exophytic cyst appears simple. Bilateral severe   hydronephrosis. Only a right ureteral jet was seen. Large prevoid volume        --- End of Report ---            ALLI VARGHESE MD; Attending Radiologist  This document has been electronically signed. Feb 4 2024  6:15PM    < end of copied text >  < from: CT Abdomen and Pelvis No Cont (02.04.24 @ 15:44) >    ACC: [id number] EXAM:  CT ABDOMEN AND PELVIS   ORDERED BY: ILSA MIRANDA     PROCEDURE DATE:  02/04/2024          INTERPRETATION:  CLINICAL INFORMATION: Elevated creatinine, urinary   retention    COMPARISON: 7/26/2018    CONTRAST/COMPLICATIONS:  IV Contrast: NONE  Oral Contrast: NONE  Complications: None reported at time of study completion    PROCEDURE:  CT of the Abdomen and Pelvis was performed.  Sagittal and coronal reformats were performed.    FINDINGS:  LOWER CHEST: Within normal limits.    LIVER: Within normal limits.  BILE DUCTS: Normal caliber.  GALLBLADDER: Cholelithiasis.  SPLEEN: Within normal limits.  PANCREAS: Within normal limits.  ADRENALS: Within normal limits.  KIDNEYS/URETERS: Marked bilateral hydronephrosis, as noted previously, to   the level of the bladder, increased in extent since the previous study   with increased distention and dilatation of the ureters. Increased size   of slightly hyperattenuating lesion from the upper pole of the right   kidney, measuring 3.8 cm, compared to 1.6 cm previously. Smaller,   bilateral simple cysts.    BLADDER: Distended with trabeculations  REPRODUCTIVE ORGANS: Enlarged prostate gland with seeds in place    BOWEL: No bowel obstruction.  PERITONEUM: No ascites.  VESSELS: Within normal limits.  RETROPERITONEUM/LYMPH NODES: No lymphadenopathy.  ABDOMINAL WALL: Within normal limits.  BONES: Degenerative changes. Bilateral marked muscular atrophy in the hip   joints.    IMPRESSION:  1.  Marked bilateral hydronephrosis to the level of the bladder,   increased in severity and extent since the previous study.  2.  Increased size of a hyperattenuating exophytic right renal lesion,   possible hyper proteinaceous cyst. Would recommend elective repeat   ultrasound.        --- End of Report ---            EFE GODDARD MD; Attending Radiologist  This document has been electronically signed. Feb 4 2024  3:53PM    < end of copied text >

## 2024-02-05 NOTE — BRIEF OPERATIVE NOTE - NSICDXBRIEFPOSTOP_GEN_ALL_CORE_FT
POST-OP DIAGNOSIS:  Rectal injury 05-Feb-2024 21:32:59  Yogi Angelo  
POST-OP DIAGNOSIS:  Urinary retention 05-Feb-2024 21:55:48  Sohan Xiao  Bilateral hydronephrosis 05-Feb-2024 21:55:52  Sohan Xiao  Rectal injury 05-Feb-2024 21:32:59  Yogi Angelo

## 2024-02-05 NOTE — CONSULT NOTE ADULT - ASSESSMENT
74 y/o M w/ PMHx of DM2, HTN, polio with paraplegia, CKD IV, BPH, pulm HTN, presented to ED with c/o L facial/ periorbital swelling, started 3 days ago, has been getting progressively worse, associated with pain and discomfort in his nose now spreading to left eye.  CT maxillofacial bones; c/w infetious phlegmon-cellulitis, incidentally noted on CT is a high-grade stenosis involves the origin of left internal carotid artery, likely measuring > 80%, + calcified plaque and moderate stenosis origin of HEATHER, hence neuro consulted    Pt noted to have b/l hydronephrosis +distended bladder and worsening SONDRA, seen by urology, is supposed to have suprapubic catheter     # Left ICA severe stenosis on CT maxillo-facial scan, Patient is asymptomatic, currently no symptoms suggestive of TIA/stroke, however he has had PVD and has other risk factors.    -I have recommended carotid ultrasound, if > 70-80% stenosis, we may obtain imaging of the brain and get vascular surgery input.  – Would recommend continuing aspirin, as discussed with the patient he will need to start a statin    # Left periorbital/facial cellulitis with leukocytosis    – Management per ID and if possible ENT    # Postpolio syndrome with paraplegia    – Physical therapy    # Obstructive uropathy/hydronephrosis / SONDRA    –Follow-up with urology

## 2024-02-05 NOTE — CONSULT NOTE ADULT - ASSESSMENT
74 y/o M w/ PMH of DM2, HTN, polio, CKD IV, BPH, pulmonary HTN, p/w L facial swelling. Swelling started 3 days ago, and has gotten progressively worse. C/o pain as well. Patient also has discomfort in his nose. Patient took a dose of amoxicillin outpatient. Denies fever, chills, cough, runny nose, sore throat. Also c/p pain in L side of nose. Patient states that he is suppose to have a suprapubic catheter placed soon. Patient states he has urinated since coming to ED. Started on IV vancomycin/zosyn.    1. L periorbital/facial cellulitis. Leukocytosis. BPH. Urinary retention-Bilateral hydronephrosis. SONDRA on CKD IV  - imaging reviewed  - agree with zosyn [id number]  - change vancomycin to doxycycline  - continue with abx coverage  - urology eval noted, plan for SPC placement  - monitor temps  - tolerating abx well so far; no side effects noted  - reason for abx use and side effects reviewed with patient  - f/u cultures  - fu cbc    Clinical team may change from intravenous to oral antibiotics when the following criteria are met:   1. Patient is clinically improving/stable       a)	Improved signs and symptoms of infection from initial presentation       b)	Afebrile for 24 hours       c)	Leukocytosis trending towards normal range   2. Patient is tolerating oral intake   3. Initial blood cultures are negative     When above criteria met may change iv antibiotics to: po doxy/augmentin 100mg BID/[id number] x 7 day course      72 y/o M w/ PMH of DM2, HTN, polio, CKD IV, BPH, pulmonary HTN, p/w L facial swelling. Swelling started 3 days ago, and has gotten progressively worse. C/o pain as well. Patient also has discomfort in his nose. Patient took a dose of amoxicillin outpatient. Denies fever, chills, cough, runny nose, sore throat. Also c/p pain in L side of nose. Patient states that he is suppose to have a suprapubic catheter placed soon. Patient states he has urinated since coming to ED. Started on IV vancomycin/zosyn.    1. L periorbital/facial cellulitis. Chronic sinusitis. Leukocytosis. BPH. Urinary retention-Bilateral hydronephrosis. SONDRA on CKD IV  - imaging reviewed  - agree with zosyn [id number]  - change vancomycin to doxycycline  - continue with abx coverage  - urology eval noted, plan for SPC placement  - monitor temps  - tolerating abx well so far; no side effects noted  - reason for abx use and side effects reviewed with patient  - f/u cultures  - fu cbc    Clinical team may change from intravenous to oral antibiotics when the following criteria are met:   1. Patient is clinically improving/stable       a)	Improved signs and symptoms of infection from initial presentation       b)	Afebrile for 24 hours       c)	Leukocytosis trending towards normal range   2. Patient is tolerating oral intake   3. Initial blood cultures are negative     When above criteria met may change iv antibiotics to: po doxy/augmentin 100mg BID/875/[id number] x 7 day course

## 2024-02-05 NOTE — CONSULT NOTE ADULT - ASSESSMENT
72 y/o M w/ PMH of DM2, HTN, polio, CKD IV, BPH, pulmonary HTN, p/w L facial swelling with renal evaluation of CKD IV setting of DM and chronic untreated obstructive uropathy.    CKD IV  -Patient reports being aware for 10 years of sequeale of obstructive uropathy-> CKD  -Agree with bypass of obstruction, urology SPT pending  -Creatinine of 4 could be his baseline function  -IVF if po not at goal  -NO nsaids, avoid contrast  -Needs close ckd follow up on discharge    Cellulitis  -Abx ongoing  -Renally dose    d/c with RN staff, family bedside  seen earlier, note now

## 2024-02-05 NOTE — CONSULT NOTE ADULT - ASSESSMENT
74 yo male with PMH as above admitted for periorbital cellulitis. Urology consulted for patient with b/l hydronephrosis with distended bladder on CT scan and worsening SONDRA.  Pt with strictures/bleeding with urethral catheters, only agreeable to suprapubic mcconenll catheter placement.  Recommend  - NPO for suprapubic tube placement in the OR today  - Medical Clearance for procedure    Case discussed with Dr. Xiao

## 2024-02-05 NOTE — CONSULT NOTE ADULT - SUBJECTIVE AND OBJECTIVE BOX
72 y/o M w/ PMH of DM2, HTN, polio, CKD IV, BPH, pulmonary HTN, p/w L facial swelling. Swelling started 3 days ago, and has gotten progressively worse. C/o pain as well.  Patient was admitted and started on abx. Reports being aware of CKD and told for 10-12 years that he is making his kidneys worse due to obs uropathy. Reports does not see renal and that that he is suppose to have a suprapubic catheter placed soon. Patient NPO today for SPT.       PAST MEDICAL & SURGICAL HISTORY:  Polio      Diabetes      Hypertension      No significant past surgical history          MEDICATIONS  (STANDING):  amLODIPine   Tablet 5 milliGRAM(s) Oral daily  atorvastatin 40 milliGRAM(s) Oral at bedtime  dextrose 5%. 1000 milliLiter(s) (50 mL/Hr) IV Continuous <Continuous>  dextrose 5%. 1000 milliLiter(s) (100 mL/Hr) IV Continuous <Continuous>  dextrose 50% Injectable 12.5 Gram(s) IV Push once  dextrose 50% Injectable 25 Gram(s) IV Push once  dextrose 50% Injectable 25 Gram(s) IV Push once  doxycycline IVPB 100 milliGRAM(s) IV Intermittent every 12 hours  glucagon  Injectable 1 milliGRAM(s) IntraMuscular once  influenza  Vaccine (HIGH DOSE) 0.7 milliLiter(s) IntraMuscular once  insulin lispro (ADMELOG) corrective regimen sliding scale   SubCutaneous every 6 hours  piperacillin/tazobactam IVPB.. 3.375 Gram(s) IV Intermittent every 12 hours    MEDICATIONS  (PRN):  acetaminophen     Tablet .. 650 milliGRAM(s) Oral every 6 hours PRN Mild Pain (1 - 3)  dextrose Oral Gel 15 Gram(s) Oral once PRN Blood Glucose LESS THAN 70 milliGRAM(s)/deciliter      Allergies    No Known Allergies    Intolerances        SOCIAL HISTORY:    FAMILY HISTORY:  No pertinent family history in first degree relatives        REVIEW OF SYSTEMS:    CONSTITUTIONAL: stable wekness, fevers or chills  EYES/ENT: No visual changes;  No vertigo or throat pain   NECK: No pain or stiffness  RESPIRATORY:  stable shortness of breath  CARDIOVASCULAR: No chest pain or palpitations  GASTROINTESTINAL: No abdominal or epigastric pain. No nausea, vomiting, or hematemesis; No diarrhea or constipation. No melena or hematochezia.  GENITOURINARY: No dysuria, frequency or hematuria  NEUROLOGICAL: No numbness or weakness.      T(C): , Max: 37.3 (02-04-24 @ 20:24)  T(F): , Max: 99.1 (02-04-24 @ 20:24)  HR: 86 (02-05-24 @ 15:36)  BP: 155/75 (02-05-24 @ 15:36)  BP(mean): 95 (02-04-24 @ 20:24)  RR: 18 (02-05-24 @ 15:36)  SpO2: 98% (02-05-24 @ 15:36)  Wt(kg): --      Weight (kg): 98.1 (02-04 @ 22:10)    PHYSICAL EXAM:    Constitutional: NAD, well-groomed, well-developed  HEENT: PERRLA, EOMI,  MMM  Neck: No LAD, No JVD  Respiratory: CTAB  Cardiovascular: S1 and S2, RRR  Gastrointestinal: BS+, soft, NT/ND  Extremities: No peripheral edema  Neurological: A/O x 3, no focal deficits  Psychiatric: Normal mood, normal affect  : No Murillo  Skin: No rashes  Access: Not applicable        LABS:                        9.5    10.68 )-----------( 238      ( 05 Feb 2024 07:14 )             30.1     05 Feb 2024 07:14    136    |  108    |  66     ----------------------------<  90     3.6     |  19     |  4.37   04 Feb 2024 13:47    137    |  107    |  66     ----------------------------<  95     4.0     |  21     |  4.41     Ca    7.7        05 Feb 2024 07:14  Ca    8.2        04 Feb 2024 13:47    TPro  6.4    /  Alb  2.5    /  TBili  0.3    /  DBili  x      /  AST  7      /  ALT  9      /  AlkPhos  86     05 Feb 2024 07:14  TPro  7.7    /  Alb  2.9    /  TBili  0.4    /  DBili  x      /  AST  10     /  ALT  15     /  AlkPhos  101    04 Feb 2024 13:47      Cholesterol: 153 mg/dL (02-05 @ 07:14)      Urine Studies:  Urinalysis Basic - ( 05 Feb 2024 07:14 )    Color: x / Appearance: x / SG: x / pH: x  Gluc: 90 mg/dL / Ketone: x  / Bili: x / Urobili: x   Blood: x / Protein: x / Nitrite: x   Leuk Esterase: x / RBC: x / WBC x   Sq Epi: x / Non Sq Epi: x / Bacteria: x            RADIOLOGY & ADDITIONAL STUDIES:

## 2024-02-05 NOTE — CONSULT NOTE ADULT - ASSESSMENT
73yoM admitted with facial swelling undergoing suprapubic catheter placement for chronic urinary retention. Colorectal surgery team called intra-operatively for evaluation    Plan:  Evaluation in the OR found extraperitoneal rectal injury; no intraperitoneal injuries noted  Colorectal team performed laparoscopic diverting sigmoid loop colostomy  Patient may be started on clear liquids  Continue IV Abx: Zosyn  Monitor ostomy function  Medical management as per primary team  Colorectal surgery team will follow    Plan discussed with Dr. Nicole

## 2024-02-05 NOTE — CONSULT NOTE ADULT - ASSESSMENT
73M found to have 80% stenosis of the Lt carotid.  Currently undergoing suprapubic catheter placement    Recommendations:  - f/u US duplex carotid  - Vascular surgery team will follow along  - Rest of the care as per primary team    Plan will be discussed with Vascular surgery attending, Dr. Orona

## 2024-02-05 NOTE — H&P ADULT - ASSESSMENT
74 y/o M w/ PMH of DM2, HTN, polio, CKD IV, BPH, pulmonary HTN, p/w L facial swelling    *L Periorbital cellulitis   -CT reports Extensive nasal and perinasal soft tissue swelling  +soft tissue swelling involves L pre-septal periorbital soft tissues without evidence for post septal extension.   -Vanco / Zosyn  -F/u blood cultures  -ID consult   -CT also reports that both maxillary sinuses are severely opacified w/ possible inspissation vs chronic fungal disease + chronic sinusitis -> will consult OMFS and await recommendations from ID regarding possible anti-fungal meds    *High grade L internal carotid artery stenosis noted on CT  -Carotid U/S  -Neuro consult   -Will need ASA if patient is not getting procedure  -Statin     *B/L hydronephrosis + Renal lesion + Suspect acute on CKD IV  -CT reports Marked B/L hydronephrosis increased in severity and extent.  Increased size of a hyperattenuating exophytic right renal lesion 3.8cm. Enlarged prostate gland  - consult  -NPO in case patient requires procedure in AM   -Nephro consult   -I/Os     *Hypertension  -Will start Norvasc 5mg PO daily   -Low salt diet     *Cholelithaisis  -F/u outpatient     *DM2  -Humalog ISS Q6H   -Diabetic diet     *DVT ppx   -SCDs     >75 mins required for admission  72 y/o M w/ PMH of DM2, HTN, polio, CKD IV, BPH, pulmonary HTN, p/w L facial swelling    *L Periorbital cellulitis   -CT reports Extensive nasal and perinasal soft tissue swelling  +soft tissue swelling involves L pre-septal periorbital soft tissues without evidence for post septal extension.   -Vanco / Zosyn  -F/u blood cultures  -ID consult   -CT also reports that both maxillary sinuses are severely opacified w/ possible inspissation vs chronic fungal disease + chronic sinusitis -> will await recommendations from ID regarding possible anti-fungal meds    *High grade L internal carotid artery stenosis noted on CT  -Carotid U/S  -Neuro consult   -Will need ASA if patient is not getting procedure  -Statin     *B/L hydronephrosis + Renal lesion + Suspect acute on CKD IV  -CT reports Marked B/L hydronephrosis increased in severity and extent.  Increased size of a hyperattenuating exophytic right renal lesion 3.8cm.   - consult  -NPO in case patient requires procedure in AM   -Nephro consult   -I/Os     *Hypertension  -Will start Norvasc 5mg PO daily   -Low salt diet     *Cholelithaisis  -F/u outpatient     *DM2  -Humalog ISS Q6H     *DVT ppx   -SCDs     >75 mins required for admission

## 2024-02-05 NOTE — CONSULT NOTE ADULT - ATTENDING COMMENTS
I discussed need for intervention with carotid endarterectomy with patient who at this time wants more time to consider, given his recent hospital course he does not want to undergo more operations.  I explained to him and his wife that he is at high risk of embolic events and subsequent potential permanent disability or mortality; he understands and would still like time to decide  can follow up outpatient as of right now  continue medical management .

## 2024-02-06 DIAGNOSIS — S36.60XA UNSPECIFIED INJURY OF RECTUM, INITIAL ENCOUNTER: ICD-10-CM

## 2024-02-06 LAB
ALBUMIN SERPL ELPH-MCNC: 2.4 G/DL — LOW (ref 3.3–5)
ALP SERPL-CCNC: 82 U/L — SIGNIFICANT CHANGE UP (ref 40–120)
ALT FLD-CCNC: 10 U/L — LOW (ref 12–78)
ANION GAP SERPL CALC-SCNC: 11 MMOL/L — SIGNIFICANT CHANGE UP (ref 5–17)
ANION GAP SERPL CALC-SCNC: 14 MMOL/L — SIGNIFICANT CHANGE UP (ref 5–17)
AST SERPL-CCNC: 8 U/L — LOW (ref 15–37)
BILIRUB SERPL-MCNC: 0.3 MG/DL — SIGNIFICANT CHANGE UP (ref 0.2–1.2)
BUN SERPL-MCNC: 64 MG/DL — HIGH (ref 7–23)
BUN SERPL-MCNC: 65 MG/DL — HIGH (ref 7–23)
CALCIUM SERPL-MCNC: 7.6 MG/DL — LOW (ref 8.5–10.1)
CALCIUM SERPL-MCNC: 7.9 MG/DL — LOW (ref 8.5–10.1)
CHLORIDE SERPL-SCNC: 107 MMOL/L — SIGNIFICANT CHANGE UP (ref 96–108)
CHLORIDE SERPL-SCNC: 107 MMOL/L — SIGNIFICANT CHANGE UP (ref 96–108)
CO2 SERPL-SCNC: 16 MMOL/L — LOW (ref 22–31)
CO2 SERPL-SCNC: 17 MMOL/L — LOW (ref 22–31)
CREAT SERPL-MCNC: 4.45 MG/DL — HIGH (ref 0.5–1.3)
CREAT SERPL-MCNC: 4.47 MG/DL — HIGH (ref 0.5–1.3)
EGFR: 13 ML/MIN/1.73M2 — LOW
EGFR: 13 ML/MIN/1.73M2 — LOW
GLUCOSE BLDC GLUCOMTR-MCNC: 124 MG/DL — HIGH (ref 70–99)
GLUCOSE BLDC GLUCOMTR-MCNC: 145 MG/DL — HIGH (ref 70–99)
GLUCOSE BLDC GLUCOMTR-MCNC: 186 MG/DL — HIGH (ref 70–99)
GLUCOSE SERPL-MCNC: 136 MG/DL — HIGH (ref 70–99)
GLUCOSE SERPL-MCNC: 150 MG/DL — HIGH (ref 70–99)
HCT VFR BLD CALC: 31.4 % — LOW (ref 39–50)
HGB BLD-MCNC: 9.8 G/DL — LOW (ref 13–17)
MAGNESIUM SERPL-MCNC: 1.8 MG/DL — SIGNIFICANT CHANGE UP (ref 1.6–2.6)
MAGNESIUM SERPL-MCNC: 1.8 MG/DL — SIGNIFICANT CHANGE UP (ref 1.6–2.6)
MCHC RBC-ENTMCNC: 29.3 PG — SIGNIFICANT CHANGE UP (ref 27–34)
MCHC RBC-ENTMCNC: 31.2 GM/DL — LOW (ref 32–36)
MCV RBC AUTO: 93.7 FL — SIGNIFICANT CHANGE UP (ref 80–100)
NIGHT BLUE STAIN TISS: SIGNIFICANT CHANGE UP
PHOSPHATE SERPL-MCNC: 6.4 MG/DL — HIGH (ref 2.5–4.5)
PLATELET # BLD AUTO: 254 K/UL — SIGNIFICANT CHANGE UP (ref 150–400)
POTASSIUM SERPL-MCNC: 3.9 MMOL/L — SIGNIFICANT CHANGE UP (ref 3.5–5.3)
POTASSIUM SERPL-MCNC: 4.4 MMOL/L — SIGNIFICANT CHANGE UP (ref 3.5–5.3)
POTASSIUM SERPL-SCNC: 3.9 MMOL/L — SIGNIFICANT CHANGE UP (ref 3.5–5.3)
POTASSIUM SERPL-SCNC: 4.4 MMOL/L — SIGNIFICANT CHANGE UP (ref 3.5–5.3)
PROT SERPL-MCNC: 6.6 GM/DL — SIGNIFICANT CHANGE UP (ref 6–8.3)
RBC # BLD: 3.35 M/UL — LOW (ref 4.2–5.8)
RBC # FLD: 13.8 % — SIGNIFICANT CHANGE UP (ref 10.3–14.5)
SODIUM SERPL-SCNC: 135 MMOL/L — SIGNIFICANT CHANGE UP (ref 135–145)
SODIUM SERPL-SCNC: 137 MMOL/L — SIGNIFICANT CHANGE UP (ref 135–145)
SPECIMEN SOURCE: SIGNIFICANT CHANGE UP
WBC # BLD: 19.44 K/UL — HIGH (ref 3.8–10.5)
WBC # FLD AUTO: 19.44 K/UL — HIGH (ref 3.8–10.5)

## 2024-02-06 PROCEDURE — 99233 SBSQ HOSP IP/OBS HIGH 50: CPT

## 2024-02-06 PROCEDURE — 99232 SBSQ HOSP IP/OBS MODERATE 35: CPT

## 2024-02-06 PROCEDURE — 93880 EXTRACRANIAL BILAT STUDY: CPT | Mod: 26

## 2024-02-06 RX ORDER — SODIUM BICARBONATE 1 MEQ/ML
650 SYRINGE (ML) INTRAVENOUS
Refills: 0 | Status: DISCONTINUED | OUTPATIENT
Start: 2024-02-06 | End: 2024-02-22

## 2024-02-06 RX ORDER — LANOLIN ALCOHOL/MO/W.PET/CERES
3 CREAM (GRAM) TOPICAL AT BEDTIME
Refills: 0 | Status: DISCONTINUED | OUTPATIENT
Start: 2024-02-06 | End: 2024-02-22

## 2024-02-06 RX ORDER — SODIUM CHLORIDE 9 MG/ML
1000 INJECTION, SOLUTION INTRAVENOUS
Refills: 0 | Status: DISCONTINUED | OUTPATIENT
Start: 2024-02-06 | End: 2024-02-08

## 2024-02-06 RX ORDER — ASPIRIN/CALCIUM CARB/MAGNESIUM 324 MG
81 TABLET ORAL DAILY
Refills: 0 | Status: DISCONTINUED | OUTPATIENT
Start: 2024-02-06 | End: 2024-02-19

## 2024-02-06 RX ADMIN — Medication 110 MILLIGRAM(S): at 22:51

## 2024-02-06 RX ADMIN — Medication 100 MILLIGRAM(S): at 16:25

## 2024-02-06 RX ADMIN — AMLODIPINE BESYLATE 5 MILLIGRAM(S): 2.5 TABLET ORAL at 09:22

## 2024-02-06 RX ADMIN — SODIUM CHLORIDE 125 MILLILITER(S): 9 INJECTION INTRAMUSCULAR; INTRAVENOUS; SUBCUTANEOUS at 00:33

## 2024-02-06 RX ADMIN — Medication 650 MILLIGRAM(S): at 16:43

## 2024-02-06 RX ADMIN — HYDROMORPHONE HYDROCHLORIDE 0.5 MILLIGRAM(S): 2 INJECTION INTRAMUSCULAR; INTRAVENOUS; SUBCUTANEOUS at 01:40

## 2024-02-06 RX ADMIN — Medication 110 MILLIGRAM(S): at 12:00

## 2024-02-06 RX ADMIN — Medication 3 MILLIGRAM(S): at 03:36

## 2024-02-06 RX ADMIN — SODIUM CHLORIDE 100 MILLILITER(S): 9 INJECTION, SOLUTION INTRAVENOUS at 23:01

## 2024-02-06 RX ADMIN — Medication 110 MILLIGRAM(S): at 00:33

## 2024-02-06 RX ADMIN — PIPERACILLIN AND TAZOBACTAM 25 GRAM(S): 4; .5 INJECTION, POWDER, LYOPHILIZED, FOR SOLUTION INTRAVENOUS at 17:46

## 2024-02-06 RX ADMIN — Medication 650 MILLIGRAM(S): at 22:51

## 2024-02-06 RX ADMIN — Medication 650 MILLIGRAM(S): at 17:13

## 2024-02-06 RX ADMIN — Medication 650 MILLIGRAM(S): at 11:59

## 2024-02-06 RX ADMIN — PIPERACILLIN AND TAZOBACTAM 25 GRAM(S): 4; .5 INJECTION, POWDER, LYOPHILIZED, FOR SOLUTION INTRAVENOUS at 09:22

## 2024-02-06 RX ADMIN — HYDROMORPHONE HYDROCHLORIDE 0.5 MILLIGRAM(S): 2 INJECTION INTRAMUSCULAR; INTRAVENOUS; SUBCUTANEOUS at 01:27

## 2024-02-06 RX ADMIN — Medication 3 MILLIGRAM(S): at 22:51

## 2024-02-06 RX ADMIN — ATORVASTATIN CALCIUM 40 MILLIGRAM(S): 80 TABLET, FILM COATED ORAL at 22:51

## 2024-02-06 RX ADMIN — SODIUM CHLORIDE 100 MILLILITER(S): 9 INJECTION, SOLUTION INTRAVENOUS at 13:12

## 2024-02-06 NOTE — PROGRESS NOTE ADULT - SUBJECTIVE AND OBJECTIVE BOX
Pt admitted to hospital for Lt facial swelling and cellulitis. Vascular surgery team consulted for CTA findings of 80% stenosis of Lt ICA and moderate stenosis of Rt ICA. US carotid duplex pending.   now POD1 diverting Sigmoid loop colostomy after intra-OR rectal injury        PHYSICAL EXAM: PENDING  - GENERAL: No acute distress.  - LUNGS: Clear to auscultation bilaterally. No accessory muscle use.  - CARDIOVASCULAR: Regular rate and rhythm. No murmur. No JVD.  - ABDOMEN: Soft, appropriately tender, colosotmy w no function yet         Vitals:  T(C): 36.3 (02-05 @ 15:36), Max: 37.3 (02-04 @ 20:24)  HR: 86 (02-05 @ 15:36) (80 - 89)  BP: 155/75 (02-05 @ 15:36) (155/75 - 187/64)  RR: 18 (02-05 @ 15:36) (18 - 18)  SpO2: 98% (02-05 @ 15:36) (98% - 100%)      I&Os    .    Labs:  02-05 @ 07:14                    9.5  CBC: 10.68>)-------(<238                     30.1                 136 | 108 | 66    CMP:  ----------------------< 90               3.6 | 19 | 4.37                      Ca:7.7  Phos:-  Mg:-               0.3|      |7        LFTs:  ------|86|-----             -|      |-  02-04 @ 13:47                    10.5  CBC: 13.39>)-------(<230                     32.9                 137 | 107 | 66    CMP:  ----------------------< 95               4.0 | 21 | 4.41                      Ca:8.2  Phos:-  Mg:-               0.4|      |10        LFTs:  ------|101|-----             -|      |-        Cultures:    Culture - Blood (collected 02-04-24 @ 13:47)  Source: .Blood None  Preliminary Report (02-05-24 @ 19:02):    No growth at 24 hours    Culture - Blood (collected 02-04-24 @ 13:47)  Source: .Blood None  Preliminary Report (02-05-24 @ 19:02):    No growth at 24 hours          Current Inpatient Medications:  acetaminophen     Tablet .. 650 milliGRAM(s) Oral every 6 hours PRN  amLODIPine   Tablet 5 milliGRAM(s) Oral daily  atorvastatin 40 milliGRAM(s) Oral at bedtime  dextrose 5%. 1000 milliLiter(s) (50 mL/Hr) IV Continuous <Continuous>  dextrose 5%. 1000 milliLiter(s) (100 mL/Hr) IV Continuous <Continuous>  dextrose 50% Injectable 12.5 Gram(s) IV Push once  dextrose 50% Injectable 25 Gram(s) IV Push once  dextrose 50% Injectable 25 Gram(s) IV Push once  dextrose Oral Gel 15 Gram(s) Oral once PRN  doxycycline IVPB 100 milliGRAM(s) IV Intermittent every 12 hours  glucagon  Injectable 1 milliGRAM(s) IntraMuscular once  influenza  Vaccine (HIGH DOSE) 0.7 milliLiter(s) IntraMuscular once  insulin lispro (ADMELOG) corrective regimen sliding scale   SubCutaneous every 6 hours  piperacillin/tazobactam IVPB.. 3.375 Gram(s) IV Intermittent every 12 hours

## 2024-02-06 NOTE — PROGRESS NOTE ADULT - ASSESSMENT
72 y/o M w/ PMHx of DM2, HTN, polio with paraplegia, CKD IV, BPH, pulm HTN, presented to ED with c/o L facial/ periorbital swelling, started 3 days ago, has been getting progressively worse, associated with pain and discomfort in his nose now spreading to left eye.  CT maxillofacial bones; c/w infetious phlegmon-cellulitis, incidentally noted on CT is a high-grade stenosis involves the origin of left internal carotid artery, likely measuring > 80%, + calcified plaque and moderate stenosis origin of HEATHER, hence neuro consulted    Pt noted to have b/l hydronephrosis +distended bladder and worsening SONDRA, seen by urology, is supposed to have suprapubic catheter     # Carotid dopplers reveal high grade, > 70% stenoses of both internal carotid arteries; patient is asymptomatic, currently no symptoms suggestive of TIA/stroke, however he has had PVD and has other risk factors.    -I recommended MRI of the brain and CT angiogram head/neck to further evaluate extra / intracranial vessels and evaluate for subacute/acute strokes, patient however refuses, he does not want to be transferred from stretcher to MRI scanner.  – I have hence recommended aspirin 81 Mg daily and atorvastatin 40 MGs daily, patient had been on statin previously, discontinued it few years back  – As discussed with the patient and his family members, he needs to follow-up with vascular surgery as outpatient, and have periodic carotid Dopplers    # Left periorbital/facial cellulitis with leukocytosis    – Management per ID and if possible ENT    # Postpolio syndrome with paraplegia    – Physical therapy    # Obstructive uropathy/hydronephrosis / SONDRA; s/p diag laparoscopy with diverting sigmoid loop colostomy 2/5/24    –Follow-up with urology / colorectal surgery    Call neuro if needed henceforth.   Discussed with MIR Jackson

## 2024-02-06 NOTE — PROGRESS NOTE ADULT - SUBJECTIVE AND OBJECTIVE BOX
Pt is awake and alert, has no new complaints, underwent diagnostic laparoscopy with diverting sigmoid loop colostomy 2/5/24    Carotid dopplers reveal high grade, greater than 70% stenoses of both the right and   left internal carotid arteries.      ROS: As above, other ROS Negative    MEDICATIONS  (STANDING):  amLODIPine   Tablet 5 milliGRAM(s) Oral daily  atorvastatin 40 milliGRAM(s) Oral at bedtime  doxycycline IVPB 100 milliGRAM(s) IV Intermittent every 12 hours  glucagon  Injectable 1 milliGRAM(s) IntraMuscular once  influenza  Vaccine (HIGH DOSE) 0.7 milliLiter(s) IntraMuscular once  insulin lispro (ADMELOG) corrective regimen sliding scale   SubCutaneous every 6 hours  lactated ringers. 1000 milliLiter(s) (100 mL/Hr) IV Continuous <Continuous>  piperacillin/tazobactam IVPB.. 3.375 Gram(s) IV Intermittent every 12 hours  sodium bicarbonate 650 milliGRAM(s) Oral two times a day      Vital Signs Last 24 Hrs  T(C): 36.5 (06 Feb 2024 13:43), Max: 36.7 (06 Feb 2024 00:00)  T(F): 97.7 (06 Feb 2024 13:43), Max: 98 (06 Feb 2024 00:00)  HR: 85 (06 Feb 2024 12:00) (79 - 88)  BP: 159/71 (06 Feb 2024 12:00) (152/69 - 169/79)  BP(mean): 97 (06 Feb 2024 12:00) (92 - 107)  RR: 17 (06 Feb 2024 12:00) (13 - 23)  SpO2: 100% (06 Feb 2024 12:00) (95% - 100%)    Parameters below as of 06 Feb 2024 00:00  Patient On (Oxygen Delivery Method): room air    Gen exam:   Normocephalic, in no distress, awake and alert.  HEENT: PERRLA, EOMI, left brooke-orbital ecchymosis / edema is lesser today  Neck: Supple.    Neurological exam:  HF: A x O x 3. Appropriately interactive, normal affect. Speech fluent, No Aphasia or paraphasic errors. Naming /repetition intact   CN: ARIAS, EOMI, VFF, facial sensation normal, no NLFD, tongue midline, Palate moves equally, SCM equal bilaterally  Motor: No pronator drift, Strength 5/5 in BUE, Atrophy of calf muscles > Quards BLE with foot drop.    Sens: Intact to light touch / PP   Reflexes: BJ 2+, BR 2+, KJ 2 0, AJ 0, downgoing toes b/l  Coord:  No FNFA,  Gait/Balance: Paraplegia- WC bound                          9.8    19.44 )-----------( 254      ( 06 Feb 2024 05:57 )             31.4     02-06    137  |  107  |  64<H>  ----------------------------<  150<H>  4.4   |  16<L>  |  4.47<H>    Ca    7.9<L>      06 Feb 2024 05:57  Phos  6.4     02-05  Mg     1.8     02-06    TPro  6.6  /  Alb  2.4<L>  /  TBili  0.3  /  DBili  x   /  AST  8<L>  /  ALT  10<L>  /  AlkPhos  82  02-06      02-05 Chol 153 LDL -- HDL 36<L> Trig 109    Radiology report:  < from: US Duplex Carotid Arteries Complete, Bilateral (02.06.24 @ 09:10) >    There are high grade, greater than 70% stenoses of both the right and   left internal carotid arteries.    There are flow-limiting stenoses of the right and left external carotid arteries.  There is retrograde flow in the right vertebral artery.    A dedicated CTA or MRA of the great vessels arising from the arch is   recommended to confirm the high grade stenoses of the right and left   internal carotid arteries and to investigate the retrograde flow in the   right vertebral artery with possible steal phenomenon.      < from: CT Maxillofacial w/ IV Cont (02.04.24 @ 15:45) >  Extensive nasal and perinasal soft tissue swelling is noted. Soft tissue   swelling also involves the left preseptal periorbital soft tissues,   without evidence for post septal extension at this time. This most likely   reflects a soft tissue infectious phlegmon-cellulitis, without focal   abscess collection at this time.    Both maxillary sinuses are severely opacified. The secretions demonstrate   increased density on the right, which may reflect chronicity-inspissation   versus chronic fungal sinus disease. The walls of the maxillary sinuses   are mildly thickened suggesting a chronic sinusitis component.    A high-grade stenosis involves the origin of the left internal carotid   artery, likely measuring greater than 80% via NASCET criteria. Calcified   plaque and moderate stenosis involves the origin of the right internal   carotid artery. Correlation with carotid ultrasound is recommended.

## 2024-02-06 NOTE — PROGRESS NOTE ADULT - SUBJECTIVE AND OBJECTIVE BOX
Date of service: 02-06-24 @ 15:00    pt seen and examined  attempted spc placement yesterday c/b rectal injury s/p diverting ostomy   pod #1  L periorbital/facial swelling tenderness better this am  no fevers    ROS: no fever or chills; denies dizziness, no HA, no SOB or cough, no legs pain, no rashes    MEDICATIONS  (STANDING):  amLODIPine   Tablet 5 milliGRAM(s) Oral daily  aspirin enteric coated 81 milliGRAM(s) Oral daily  atorvastatin 40 milliGRAM(s) Oral at bedtime  dextrose 5%. 1000 milliLiter(s) (50 mL/Hr) IV Continuous <Continuous>  dextrose 5%. 1000 milliLiter(s) (100 mL/Hr) IV Continuous <Continuous>  dextrose 50% Injectable 12.5 Gram(s) IV Push once  dextrose 50% Injectable 25 Gram(s) IV Push once  dextrose 50% Injectable 25 Gram(s) IV Push once  doxycycline IVPB 100 milliGRAM(s) IV Intermittent every 12 hours  glucagon  Injectable 1 milliGRAM(s) IntraMuscular once  influenza  Vaccine (HIGH DOSE) 0.7 milliLiter(s) IntraMuscular once  insulin lispro (ADMELOG) corrective regimen sliding scale   SubCutaneous every 6 hours  lactated ringers. 1000 milliLiter(s) (100 mL/Hr) IV Continuous <Continuous>  piperacillin/tazobactam IVPB.. 3.375 Gram(s) IV Intermittent every 12 hours  sodium bicarbonate 650 milliGRAM(s) Oral two times a day      Vital Signs Last 24 Hrs  T(C): 36.8 (06 Feb 2024 16:00), Max: 36.8 (06 Feb 2024 16:00)  T(F): 98.2 (06 Feb 2024 16:00), Max: 98.2 (06 Feb 2024 16:00)  HR: 81 (06 Feb 2024 16:00) (79 - 88)  BP: 148/59 (06 Feb 2024 16:00) (148/59 - 176/59)  BP(mean): 82 (06 Feb 2024 16:00) (82 - 107)  RR: 18 (06 Feb 2024 16:00) (13 - 23)  SpO2: 98% (06 Feb 2024 16:00) (95% - 100%)    Parameters below as of 06 Feb 2024 16:00  Patient On (Oxygen Delivery Method): room air              PE:  Constitutional: NAD L periorbital/L facial edema tenderness   HEENT: NC/AT, EOMI, PERRLA, conjunctivae clear; ears and nose atraumatic; pharynx benign  Neck: supple; thyroid not palpable  Back: no tenderness  Respiratory: respiratory effort normal; clear to auscultation  Cardiovascular: S1S2 regular, no murmurs  Abdomen: soft, not tender, not distended, positive BS; +ostomy  Genitourinary: no suprapubic tenderness  Lymphatic: no LN palpable  Musculoskeletal: no muscle tenderness, no joint swelling or tenderness  Extremities: no pedal edema  Neurological/ Psychiatric: AxOx3, Judgement and insight normal;  moving all extremities  Skin: no rashes; no palpable lesions    Labs: all available labs reviewed                                   9.8    19.44 )-----------( 254      ( 06 Feb 2024 05:57 )             31.4     02-06    137  |  107  |  64<H>  ----------------------------<  150<H>  4.4   |  16<L>  |  4.47<H>    Ca    7.9<L>      06 Feb 2024 05:57  Phos  6.4     02-05  Mg     1.8     02-06    TPro  6.6  /  Alb  2.4<L>  /  TBili  0.3  /  DBili  x   /  AST  8<L>  /  ALT  10<L>  /  AlkPhos  82  02-06       Culture - Blood (02.04.24 @ 13:47)   Specimen Source: .Blood None  Culture Results:   No growth at 48 Hours  Culture - Blood (02.04.24 @ 13:47)   Specimen Source: .Blood None  Culture Results:   No growth at 48 Hours        Radiology: all available radiological tests reviewed  < from: CT Maxillofacial w/ IV Cont (02.04.24 @ 15:45) >    ACC: [id number] EXAM:  CT MAXILLOFACIAL IC   ORDERED BY: ILSA HOOD DATE:  02/04/2024          INTERPRETATION:  History: Swelling of the left face, question facial   abscess near the nose.  73-year-old with past medical history for   diabetes, hypertension, polio presents emergency department with left   facial swelling. Patient started with symptoms approximately 3 days ago.   Patient noticed the painful area left nose. Now patient with more   swelling left side of face. Patient did take some amoxicillin at home   which helped but did not take last night and now swelling is worse today.    Description: A postcontrast CT scan of the maxillofacial region was   performed.    No prior maxillofacial imaging study was available for comparison at this   Medical Center.    Axial images with coronal and sagittal reformatted series were obtained.    90 cc intravenous Omnipaque 350 contrast was administered.    Extensive nasal and perinasal soft tissue swelling is noted. Soft tissue   swelling also involves the left preseptal periorbital soft tissues,   without evidence for post septal extension at this time. This most likely   reflects a soft tissue infectious phlegmon-cellulitis, without focal   abscess collection at this time.    Both maxillary sinuses are severely opacified. The secretions demonstrate   increased density on the right, which may reflect chronicity-inspissation   versus chronic fungal sinus disease. The walls of the maxillary sinuses   are mildly thickened suggesting a chronic sinusitis component. Mild   mucosal thickening involves the ethmoid sinuses. The sphenoid sinus is   overall well aerated.    Bilateral nasal bone deformities are noted which may reflect old   fractures (there is no submitted clinicalhistory of recent facial   trauma).    A high-grade stenosis involves the origin of the left internal carotid   artery, likely measuring greater than 80% via NASCET criteria. Calcified   plaque and moderate stenosis involves the origin of the right internal   carotid artery. Correlation with carotid ultrasound is recommended.    Impacted horizontally oriented wisdom teeth involve the bilateral   maxilla-floor of the maxillary sinuses.    IMPRESSION:    Extensive nasal and perinasal soft tissue swelling is noted. Soft tissue   swelling also involves the left preseptal periorbital soft tissues,   without evidence for post septal extension at this time. This most likely   reflects a soft tissue infectious phlegmon-cellulitis, without focal   abscess collection at this time.    Both maxillary sinuses are severely opacified. The secretions demonstrate   increased density on the right, which may reflect chronicity-inspissation   versus chronic fungal sinus disease. The walls of the maxillary sinuses   are mildly thickened suggesting a chronic sinusitis component.    A high-grade stenosis involves the origin of the left internal carotid   artery, likely measuring greater than 80% via NASCET criteria. Calcified   plaque and moderate stenosis involves the origin of the right internal   carotid artery. Correlation with carotid ultrasound is recommended.      Advanced directives addressed: full resuscitation

## 2024-02-06 NOTE — PROGRESS NOTE ADULT - SUBJECTIVE AND OBJECTIVE BOX
Patient is a 73y Male who went to OR, c/b rectal injury now with ostomy and mcconnell, spt not placed       MEDICATIONS  (STANDING):  amLODIPine   Tablet 5 milliGRAM(s) Oral daily  atorvastatin 40 milliGRAM(s) Oral at bedtime  dextrose 5%. 1000 milliLiter(s) (50 mL/Hr) IV Continuous <Continuous>  dextrose 5%. 1000 milliLiter(s) (100 mL/Hr) IV Continuous <Continuous>  dextrose 50% Injectable 12.5 Gram(s) IV Push once  dextrose 50% Injectable 25 Gram(s) IV Push once  dextrose 50% Injectable 25 Gram(s) IV Push once  doxycycline IVPB 100 milliGRAM(s) IV Intermittent every 12 hours  glucagon  Injectable 1 milliGRAM(s) IntraMuscular once  influenza  Vaccine (HIGH DOSE) 0.7 milliLiter(s) IntraMuscular once  insulin lispro (ADMELOG) corrective regimen sliding scale   SubCutaneous every 6 hours  lactated ringers. 1000 milliLiter(s) (100 mL/Hr) IV Continuous <Continuous>  piperacillin/tazobactam IVPB.. 3.375 Gram(s) IV Intermittent every 12 hours    MEDICATIONS  (PRN):  acetaminophen     Tablet .. 650 milliGRAM(s) Oral every 6 hours PRN Mild Pain (1 - 3)  dextrose Oral Gel 15 Gram(s) Oral once PRN Blood Glucose LESS THAN 70 milliGRAM(s)/deciliter  HYDROmorphone  Injectable 0.5 milliGRAM(s) IV Push every 4 hours PRN Severe Pain (7 - 10)  melatonin 3 milliGRAM(s) Oral at bedtime PRN Sleep        T(C): , Max: 36.7 (02-06-24 @ 00:00)  T(F): , Max: 98 (02-06-24 @ 00:00)  HR: 87 (02-06-24 @ 10:00)  BP: 169/79 (02-06-24 @ 10:00)  BP(mean): 107 (02-06-24 @ 10:00)  RR: 17 (02-06-24 @ 10:00)  SpO2: 100% (02-06-24 @ 10:00)  Wt(kg): --    02-05 @ 07:01  -  02-06 @ 07:00  --------------------------------------------------------  IN: 875 mL / OUT: 750 mL / NET: 125 mL          PHYSICAL EXAM:    Constitutional: NAD,    HEENT: MM  dist  Cardiovascular: S1 and S2   Ostomy  Extremities: chr  peripheral edema  Neurological: A/O x 3   : Mcconnell         LABS:                        9.8    19.44 )-----------( 254      ( 06 Feb 2024 05:57 )             31.4     06 Feb 2024 05:57    137    |  107    |  64     ----------------------------<  150    4.4     |  16     |  4.47   05 Feb 2024 23:55    135    |  107    |  65     ----------------------------<  136    3.9     |  17     |  4.45   05 Feb 2024 07:14    136    |  108    |  66     ----------------------------<  90     3.6     |  19     |  4.37   04 Feb 2024 13:47    137    |  107    |  66     ----------------------------<  95     4.0     |  21     |  4.41     Ca    7.9        06 Feb 2024 05:57  Ca    7.6        05 Feb 2024 23:55  Ca    7.7        05 Feb 2024 07:14  Ca    8.2        04 Feb 2024 13:47  Phos  6.4       05 Feb 2024 23:55  Mg     1.8       06 Feb 2024 05:57  Mg     1.8       05 Feb 2024 23:55    TPro  6.6    /  Alb  2.4    /  TBili  0.3    /  DBili  x      /  AST  8      /  ALT  10     /  AlkPhos  82     06 Feb 2024 05:57  TPro  6.4    /  Alb  2.5    /  TBili  0.3    /  DBili  x      /  AST  7      /  ALT  9      /  AlkPhos  86     05 Feb 2024 07:14  TPro  7.7    /  Alb  2.9    /  TBili  0.4    /  DBili  x      /  AST  10     /  ALT  15     /  AlkPhos  101    04 Feb 2024 13:47          Urine Studies:  Urinalysis Basic - ( 06 Feb 2024 05:57 )    Color: x / Appearance: x / SG: x / pH: x  Gluc: 150 mg/dL / Ketone: x  / Bili: x / Urobili: x   Blood: x / Protein: x / Nitrite: x   Leuk Esterase: x / RBC: x / WBC x   Sq Epi: x / Non Sq Epi: x / Bacteria: x            RADIOLOGY & ADDITIONAL STUDIES:

## 2024-02-06 NOTE — PROGRESS NOTE ADULT - SUBJECTIVE AND OBJECTIVE BOX
patient seen and examined  events  noted yesterday  patient now s/p diverting ostomy  mcconnell in place  white count 19  antibiotics now broadened to IV zosyn  patient hungry  denies ANY PAIN ON EYE MOVEMENTS. DENIES BLURRY VISION OR DOUBLE VISION. reports (cloudy vision)    Review of Systems:  General:denies fever chills, headache, weakness  HEENT: denies blurry vision,diffculty swallowing, difficulty hearing, tinnitus  Cardiovascular: denies chest pain  ,palpitations  Pulmonary:denies shortness of breath, cough, wheezing, hemoptysis  Gastrointestinal: denies abdominal pain, constipation, diarrhea,nausea , vomiting, hematochezia  : denies hematuria, dysuria, or incontinence  Neurological: denies weakness, numbness , tingling, dizziness, tremors  MSK: denies muscle pain, difficulty ambulating, swelling, back pain  skin: denies skin rash, itching, burning, or  skin lesions  Psychiatrical: denies mood disturbances, anxierty, feeling depressed, depression , or difficulty sleeping    Objective:  Vitals  T(C): 36.5 (02-06-24 @ 13:43), Max: 36.7 (02-06-24 @ 00:00)  HR: 85 (02-06-24 @ 12:00) (79 - 88)  BP: 159/71 (02-06-24 @ 12:00) (152/69 - 169/79)  RR: 17 (02-06-24 @ 12:00) (13 - 23)  SpO2: 100% (02-06-24 @ 12:00) (95% - 100%)    Physical Exam:  General: comfortable, no acute distress  HEENT: Atraumatic, no LAD, trachea midline, PERRLA  Cardiovascular: normal s1s2, no murmurs, gallops or fricition rubs  Pulmonary: clear to ausculation Bilaterally, no wheezing , rhonchi  Gastrointestinal: soft non tender non distended, no masses felt, no organomegally  Muscloskeletal: no lower extremity edema, intact bilateral lower extremity pulses  Neurological: CN II-12 intact. No focal weakness  Psychiatrical: normal mood, cooperative  SKIN: no rash, lesions or ulcers    Labs:                          9.8    19.44 )-----------( 254      ( 06 Feb 2024 05:57 )             31.4     02-06    137  |  107  |  64<H>  ----------------------------<  150<H>  4.4   |  16<L>  |  4.47<H>    Ca    7.9<L>      06 Feb 2024 05:57  Phos  6.4     02-05  Mg     1.8     02-06    TPro  6.6  /  Alb  2.4<L>  /  TBili  0.3  /  DBili  x   /  AST  8<L>  /  ALT  10<L>  /  AlkPhos  82  02-06    LIVER FUNCTIONS - ( 06 Feb 2024 05:57 )  Alb: 2.4 g/dL / Pro: 6.6 gm/dL / ALK PHOS: 82 U/L / ALT: 10 U/L / AST: 8 U/L / GGT: x           PT/INR - ( 05 Feb 2024 07:14 )   PT: 13.1 sec;   INR: 1.16 ratio         PTT - ( 05 Feb 2024 07:14 )  PTT:32.2 sec      Active Medications  MEDICATIONS  (STANDING):  amLODIPine   Tablet 5 milliGRAM(s) Oral daily  atorvastatin 40 milliGRAM(s) Oral at bedtime  dextrose 5%. 1000 milliLiter(s) (50 mL/Hr) IV Continuous <Continuous>  dextrose 5%. 1000 milliLiter(s) (100 mL/Hr) IV Continuous <Continuous>  dextrose 50% Injectable 12.5 Gram(s) IV Push once  dextrose 50% Injectable 25 Gram(s) IV Push once  dextrose 50% Injectable 25 Gram(s) IV Push once  doxycycline IVPB 100 milliGRAM(s) IV Intermittent every 12 hours  glucagon  Injectable 1 milliGRAM(s) IntraMuscular once  influenza  Vaccine (HIGH DOSE) 0.7 milliLiter(s) IntraMuscular once  insulin lispro (ADMELOG) corrective regimen sliding scale   SubCutaneous every 6 hours  lactated ringers. 1000 milliLiter(s) (100 mL/Hr) IV Continuous <Continuous>  piperacillin/tazobactam IVPB.. 3.375 Gram(s) IV Intermittent every 12 hours  sodium bicarbonate 650 milliGRAM(s) Oral two times a day    MEDICATIONS  (PRN):  acetaminophen     Tablet .. 650 milliGRAM(s) Oral every 6 hours PRN Mild Pain (1 - 3)  benzonatate 100 milliGRAM(s) Oral every 8 hours PRN Cough  dextrose Oral Gel 15 Gram(s) Oral once PRN Blood Glucose LESS THAN 70 milliGRAM(s)/deciliter  HYDROmorphone  Injectable 0.5 milliGRAM(s) IV Push every 4 hours PRN Severe Pain (7 - 10)  melatonin 3 milliGRAM(s) Oral at bedtime PRN Sleep     patient seen and examined  events  noted yesterday  patient now s/p diverting ostomy  mcconnell in place  white count 19  antibiotics now broadened to IV zosyn  patient hungry  denies ANY PAIN ON EYE MOVEMENTS. DENIES BLURRY VISION OR DOUBLE VISION. reports (cloudy vision)    Review of Systems:  General:denies fever chills, headache, weakness  HEENT: denies blurry vision,diffculty swallowing, difficulty hearing, tinnitus  Cardiovascular: denies chest pain  ,palpitations  Pulmonary:denies shortness of breath, cough, wheezing, hemoptysis  Gastrointestinal: denies abdominal pain, constipation, diarrhea,nausea , vomiting, hematochezia  : denies hematuria, dysuria, or incontinence  Neurological: denies weakness, numbness , tingling, dizziness, tremors  MSK: denies muscle pain, difficulty ambulating, swelling, back pain  skin: denies skin rash, itching, burning, or  skin lesions  Psychiatrical: denies mood disturbances, anxierty, feeling depressed, depression , or difficulty sleeping    Objective:  Vitals  T(C): 36.5 (02-06-24 @ 13:43), Max: 36.7 (02-06-24 @ 00:00)  HR: 85 (02-06-24 @ 12:00) (79 - 88)  BP: 159/71 (02-06-24 @ 12:00) (152/69 - 169/79)  RR: 17 (02-06-24 @ 12:00) (13 - 23)  SpO2: 100% (02-06-24 @ 12:00) (95% - 100%)    Physical Exam:  General: comfortable, no acute distress  HEENT: Atraumatic, no LAD, trachea midline, PERRLA  Cardiovascular: normal s1s2, no murmurs, gallops or fricition rubs  Pulmonary: clear to ausculation Bilaterally, no wheezing , rhonchi  Gastrointestinal: soft non tender non distended, no masses felt, no organomegally: Ostomy  : mcconnell  Muscloskeletal: no lower extremity edema, intact bilateral lower extremity pulses  Neurological: CN II-12 intact. No focal weakness  Psychiatrical: normal mood, cooperative  SKIN: no rash, lesions or ulcers    Labs:                          9.8    19.44 )-----------( 254      ( 06 Feb 2024 05:57 )             31.4     02-06    137  |  107  |  64<H>  ----------------------------<  150<H>  4.4   |  16<L>  |  4.47<H>    Ca    7.9<L>      06 Feb 2024 05:57  Phos  6.4     02-05  Mg     1.8     02-06    TPro  6.6  /  Alb  2.4<L>  /  TBili  0.3  /  DBili  x   /  AST  8<L>  /  ALT  10<L>  /  AlkPhos  82  02-06    LIVER FUNCTIONS - ( 06 Feb 2024 05:57 )  Alb: 2.4 g/dL / Pro: 6.6 gm/dL / ALK PHOS: 82 U/L / ALT: 10 U/L / AST: 8 U/L / GGT: x           PT/INR - ( 05 Feb 2024 07:14 )   PT: 13.1 sec;   INR: 1.16 ratio         PTT - ( 05 Feb 2024 07:14 )  PTT:32.2 sec      Active Medications  MEDICATIONS  (STANDING):  amLODIPine   Tablet 5 milliGRAM(s) Oral daily  atorvastatin 40 milliGRAM(s) Oral at bedtime  dextrose 5%. 1000 milliLiter(s) (50 mL/Hr) IV Continuous <Continuous>  dextrose 5%. 1000 milliLiter(s) (100 mL/Hr) IV Continuous <Continuous>  dextrose 50% Injectable 12.5 Gram(s) IV Push once  dextrose 50% Injectable 25 Gram(s) IV Push once  dextrose 50% Injectable 25 Gram(s) IV Push once  doxycycline IVPB 100 milliGRAM(s) IV Intermittent every 12 hours  glucagon  Injectable 1 milliGRAM(s) IntraMuscular once  influenza  Vaccine (HIGH DOSE) 0.7 milliLiter(s) IntraMuscular once  insulin lispro (ADMELOG) corrective regimen sliding scale   SubCutaneous every 6 hours  lactated ringers. 1000 milliLiter(s) (100 mL/Hr) IV Continuous <Continuous>  piperacillin/tazobactam IVPB.. 3.375 Gram(s) IV Intermittent every 12 hours  sodium bicarbonate 650 milliGRAM(s) Oral two times a day    MEDICATIONS  (PRN):  acetaminophen     Tablet .. 650 milliGRAM(s) Oral every 6 hours PRN Mild Pain (1 - 3)  benzonatate 100 milliGRAM(s) Oral every 8 hours PRN Cough  dextrose Oral Gel 15 Gram(s) Oral once PRN Blood Glucose LESS THAN 70 milliGRAM(s)/deciliter  HYDROmorphone  Injectable 0.5 milliGRAM(s) IV Push every 4 hours PRN Severe Pain (7 - 10)  melatonin 3 milliGRAM(s) Oral at bedtime PRN Sleep

## 2024-02-06 NOTE — PROGRESS NOTE ADULT - ASSESSMENT
73yoM admitted with facial swelling undergoing suprapubic catheter placement for chronic urinary retention. Colorectal surgery team called intra-operatively for evaluation  now POD1 laparoscopic diverting sigmoid loop colostomy after intra-OR rectal injury    Plan:  Continue w CLD  Continue IV Abx: Zosyn  Monitor ostomy function  Medical management as per primary team  Colorectal surgery team will follow

## 2024-02-06 NOTE — PROGRESS NOTE ADULT - ASSESSMENT
73M found to have 80% stenosis of the Lt carotid.      Recommendations:  - f/u US duplex carotid  - Vascular surgery team will follow along  - Rest of the care as per primary team

## 2024-02-06 NOTE — PROGRESS NOTE ADULT - ASSESSMENT
A/P: 73y Male s/p attempted SPT placement    Continue ICU managment  Diet as per [place]-rectal  Ck am labs  Continue mcconnell to bag drainage  Irrigate mcconnell PRN  Dr. Xiao to talk to patient today about yesterday's events  Above discussed with Dr. Xiao

## 2024-02-06 NOTE — PROGRESS NOTE ADULT - SUBJECTIVE AND OBJECTIVE BOX
POD # 1    Pt resting in bed.  Rohith pain with pain medication.  Pt has some questions about yesterday's procedure.  Rohith water, no other complaints    Vital Signs Last 24 Hrs  T(C): 36.5 (06 Feb 2024 09:19), Max: 36.7 (06 Feb 2024 00:00)  T(F): 97.7 (06 Feb 2024 09:19), Max: 98 (06 Feb 2024 00:00)  HR: 84 (06 Feb 2024 08:00) (79 - 88)  BP: 155/75 (06 Feb 2024 08:00) (152/69 - 169/75)  BP(mean): 99 (06 Feb 2024 08:00) (92 - 103)  RR: 18 (06 Feb 2024 08:00) (13 - 23)  SpO2: 98% (06 Feb 2024 08:00) (95% - 100%)    Parameters below as of 06 Feb 2024 00:00  Patient On (Oxygen Delivery Method): room air        I&O's Summary    05 Feb 2024 07:01  -  06 Feb 2024 07:00  --------------------------------------------------------  IN: 875 mL / OUT: 750 mL / NET: 125 mL        Physical Exam  Gen: NAD, A&Ox3  Pulm: CTA bilat, no rales, rhonchi or wheezes.  No respiratory distress, no subcostal retractions  CV: RRR, S1S2 normal, no JVD  Abd: Soft, NT, slightly distended, +BS            Colostomy: stool in bag  : Murillo in place - draining yellow urine, no clots                          9.8    19.44 )-----------( 254      ( 06 Feb 2024 05:57 )             31.4       02-06    137  |  107  |  64<H>  ----------------------------<  150<H>  4.4   |  16<L>  |  4.47<H>    Ca    7.9<L>      06 Feb 2024 05:57  Phos  6.4     02-05  Mg     1.8     02-06    TPro  6.6  /  Alb  2.4<L>  /  TBili  0.3  /  DBili  x   /  AST  8<L>  /  ALT  10<L>  /  AlkPhos  82  02-06

## 2024-02-06 NOTE — PROGRESS NOTE ADULT - SUBJECTIVE AND OBJECTIVE BOX
Pt POD1 diverting Sigmoid loop colostomy after intra-OR rectal injury  Doing well, extubated, pain controlled,, no N/V, tolerated some ice chips, no ostomy function yet    Vitals:  T(C): 36.7 ( @ 00:00), Max: 36.7 ( @ 08:59)  HR: 88 ( @ 06:00) (79 - 89)  BP: 169/75 ( @ 06:00) (152/69 - 176/77)  RR: 22 ( @ 06:00) (13 - 23)  SpO2: 99% ( @ 06:00) (95% - 100%)      I&Os     07:01  -   @ 07:00  --------------------------------------------------------  IN:    IV PiggyBack: 100 mL    Lactated Ringers: 125 mL    sodium chloride 0.9%: 650 mL  Total IN: 875 mL    OUT:    Indwelling Catheter - Urethral (mL): 750 mL  Total OUT: 750 mL    Total NET: 125 mL      PHYSICAL EXAM:  - GENERAL: No acute distress.  - LUNGS: Clear to auscultation bilaterally. No accessory muscle use.  - CARDIOVASCULAR: Regular rate and rhythm. No murmur. No JVD.  - ABDOMEN: Soft, appropriately tender, colosotmy w no function yet       Labs:   @ 05:57                    9.8  CBC: 19.44>)-------(<254                     31.4                 137 | 107 | 64    CMP:  ----------------------< 150               4.4 | 16 | 4.47                      Ca:7.9  Phos:-  M.8               0.3|      |8        LFTs:  ------|82|-----             -|      |-   @ 23:55                    -  CBC: ->)-------(<-                     -                 135 | 107 | 65    CMP:  ----------------------< 136               3.9 | 17 | 4.45                      Ca:7.6  Phos:6.4  M.8               -|      |-        LFTs:  ------|-|-----             -|      |-  0205 @ 07:14                    9.5  CBC: 10.68>)-------(<238                     30.1                 136 | 108 | 66    CMP:  ----------------------< 90               3.6 | 19 | 4.37                      Ca:7.7  Phos:-  Mg:-               0.3|      |7        LFTs:  ------|86|-----             -|      |-        Cultures:    Culture - Fungal, Other (collected 24 @ 17:06)  Source: .Other None  Preliminary Report (24 @ 06:53):    Testing in progress    Culture - Blood (collected 24 @ 13:47)  Source: .Blood None  Preliminary Report (24 @ 19:02):    No growth at 24 hours    Culture - Blood (collected 24 @ 13:47)  Source: .Blood None  Preliminary Report (24 @ 19:02):    No growth at 24 hours          Current Inpatient Medications:  acetaminophen     Tablet .. 650 milliGRAM(s) Oral every 6 hours PRN  amLODIPine   Tablet 5 milliGRAM(s) Oral daily  atorvastatin 40 milliGRAM(s) Oral at bedtime  dextrose 5%. 1000 milliLiter(s) (50 mL/Hr) IV Continuous <Continuous>  dextrose 5%. 1000 milliLiter(s) (100 mL/Hr) IV Continuous <Continuous>  dextrose 50% Injectable 12.5 Gram(s) IV Push once  dextrose 50% Injectable 25 Gram(s) IV Push once  dextrose 50% Injectable 25 Gram(s) IV Push once  dextrose Oral Gel 15 Gram(s) Oral once PRN  doxycycline IVPB 100 milliGRAM(s) IV Intermittent every 12 hours  glucagon  Injectable 1 milliGRAM(s) IntraMuscular once  HYDROmorphone  Injectable 0.5 milliGRAM(s) IV Push every 4 hours PRN  influenza  Vaccine (HIGH DOSE) 0.7 milliLiter(s) IntraMuscular once  insulin lispro (ADMELOG) corrective regimen sliding scale   SubCutaneous every 6 hours  melatonin 3 milliGRAM(s) Oral at bedtime PRN  piperacillin/tazobactam IVPB.. 3.375 Gram(s) IV Intermittent every 12 hours  sodium chloride 0.9%. 1000 milliLiter(s) (125 mL/Hr) IV Continuous <Continuous>

## 2024-02-06 NOTE — PROGRESS NOTE ADULT - ASSESSMENT
74 y/o M w/ PMH of DM2, HTN, polio, CKD IV, BPH, pulmonary HTN, p/w L facial swelling.  Patient underwent SPC for hydronephrosis. Course c/b rectal injury now status post diverting ostomy    *L Periorbital cellulitis   -CT reports Extensive nasal and perinasal soft tissue swelling  +soft tissue swelling involves L pre-septal periorbital soft tissues without evidence for post septal extension.   ID consulted  Cblood cultures negative  Called [facility] for Optho consult (2/5) Patient does not need transfer for eval  Attempted to contact [facility] for ENT: unsuccessful  -CT also reports that both maxillary sinuses are severely opacified w/ possible inspissation vs chronic fungal disease + chronic sinusitis -> will await recommendations from ID regarding possible anti-fungal meds        *High grade L internal carotid artery stenosis noted on CT  -Carotid U/S: ordered noted High grade stenosis bilaterally  -Neuro consult  AND VASCULAR recs appreciated  -Patient refused MRI  brain  plan  asa  Statin   Vascular followup    SONDRA due to B/L hydronephrosis + Renal lesion + Suspect acute on CKD IV  -CT reports Marked B/L hydronephrosis increased in severity and extent.  Increased size of a hyperattenuating exophytic right renal lesion 3.8cm.   - and Nephro consulted  -Patient s/p SPC --> OR aborted   plan  monitor    Rectal Injury s/p diverting ostomy  Advance diet  Rest of plan as per surgery    *Hypertension  -Will start Norvasc 5mg PO daily   -Low salt diet     *Cholelithaisis  -F/u outpatient     *DM2  -Humalog ISS Q6H     *DVT ppx   -SCDs     >75 mins required for admission  74 y/o M w/ PMH of DM2, HTN, polio, CKD IV, BPH, pulmonary HTN, p/w L facial swelling.  Patient underwent SPC for hydronephrosis. Course c/b rectal injury now status post diverting ostomy    *L Periorbital cellulitis   -CT reports Extensive nasal and perinasal soft tissue swelling  +soft tissue swelling involves L pre-septal periorbital soft tissues without evidence for post septal extension.   ID consulted  blood cultures negative  Called [facility] for Optho consult (2/5) Patient does not need transfer for eval  Attempted to contact [facility] for ENT: unsuccessful  -CT also reports that both maxillary sinuses are severely opacified w/ possible inspissation vs chronic fungal disease + chronic sinusitis ->       *High grade L internal carotid artery stenosis noted on CT  -Carotid U/S: ordered noted High grade stenosis bilaterally  -Neuro consult  AND VASCULAR recs appreciated  -Patient refused MRI  brain  plan  asa  Statin   Vascular followup    SONDRA due to B/L hydronephrosis + Renal lesion + Suspect acute on CKD IV  -CT reports Marked B/L hydronephrosis increased in severity and extent.  Increased size of a hyperattenuating exophytic right renal lesion 3.8cm.   - and Nephro consulted  -Patient s/p SPC --> OR aborted   plan  monitor    Rectal Injury s/p diverting ostomy  Advance diet  Rest of plan as per surgery    *Hypertension  -Norvasc 5mg PO daily --> uncontrolled --> adding 5 additional  -Low salt diet     *Cholelithaisis  -F/u outpatient     *DM2  -Humalog ISS Q6H     *DVT ppx   -SCDs

## 2024-02-06 NOTE — PROGRESS NOTE ADULT - ASSESSMENT
74 y/o M w/ PMH of DM2, HTN, polio, CKD IV, BPH, pulmonary HTN, p/w L facial swelling with renal evaluation of CKD IV setting of DM and chronic untreated obstructive uropathy.    CKD IV  -SP attempted SPT-> Murillo per   -Ostomy w intra op rectal injury  -Creatinine of 4 suspected baseline function  -IVF if po not at goal, LR ok for now  -NO nsaids, avoid contrast  -Needs close ckd follow up on discharge and eventual avf planning if he is willing.     Acidosis  -Add oral bicarb, will consider infusion if further decrements.    Cellulitis  -Abx ongoing  -Renally dose    d/c with RN staff

## 2024-02-06 NOTE — PROGRESS NOTE ADULT - SUBJECTIVE AND OBJECTIVE BOX
Patient is a 73y old  Male who presents with a chief complaint of L facial swelling. (06 Feb 2024 10:10)      BRIEF HOSPITAL COURSE: 74 yo male with PMHx of DM2, HTN, polio, CKD IV, BPH, pulmonary HTN, presents with L facial swelling. Swelling started 3 days ago, and has gotten progressively worse. C/o pain as well. Patient also has discomfort in his nose. Patient took a dose of amoxicillin outpatient.   Admitted for periorbital cellulitis, has b/l opacified maxillary sinuses. started on IV zosyn and IV doxycycline, s/p IV vanco x 1.   Also with b/l hydronephrosis, being seen by urology as outpatient. supposed to have suprapubic catheter placed.     Went to OR for suprapubic cathether placement, during procedure there was rectal injury. Suprapubic catheter not placed.   Intra op colorectal consult  s/p diagnostic laparoscopy with diverting sigmoid loop colostomy 2/5  no intraperitoneal injury    2/6 pt awake and alert, feels pain but cannot quantify it. states he has a high pain tolerance d/t his polio. he states his BP usually runs high "systolic 155 - 170s " as per patient.       PAST MEDICAL & SURGICAL HISTORY:  Polio  Diabetes  Hypertension  No significant past surgical history      Medications:  doxycycline IVPB 100 milliGRAM(s) IV Intermittent every 12 hours  piperacillin/tazobactam IVPB.. 3.375 Gram(s) IV Intermittent every 12 hours  amLODIPine   Tablet 5 milliGRAM(s) Oral daily  acetaminophen     Tablet .. 650 milliGRAM(s) Oral every 6 hours PRN  HYDROmorphone  Injectable 0.5 milliGRAM(s) IV Push every 4 hours PRN  melatonin 3 milliGRAM(s) Oral at bedtime PRN  atorvastatin 40 milliGRAM(s) Oral at bedtime  dextrose 50% Injectable 12.5 Gram(s) IV Push once  dextrose 50% Injectable 25 Gram(s) IV Push once  dextrose 50% Injectable 25 Gram(s) IV Push once  dextrose Oral Gel 15 Gram(s) Oral once PRN  glucagon  Injectable 1 milliGRAM(s) IntraMuscular once  insulin lispro (ADMELOG) corrective regimen sliding scale   SubCutaneous every 6 hours  dextrose 5%. 1000 milliLiter(s) IV Continuous <Continuous>  dextrose 5%. 1000 milliLiter(s) IV Continuous <Continuous>  lactated ringers. 1000 milliLiter(s) IV Continuous <Continuous>  influenza  Vaccine (HIGH DOSE) 0.7 milliLiter(s) IntraMuscular once      ICU Vital Signs Last 24 Hrs  T(C): 36.5 (06 Feb 2024 09:19), Max: 36.7 (06 Feb 2024 00:00)  T(F): 97.7 (06 Feb 2024 09:19), Max: 98 (06 Feb 2024 00:00)  HR: 87 (06 Feb 2024 10:00) (79 - 88)  BP: 169/79 (06 Feb 2024 10:00) (152/69 - 169/79)  BP(mean): 107 (06 Feb 2024 10:00) (92 - 107)  ABP: --  ABP(mean): --  RR: 17 (06 Feb 2024 10:00) (13 - 23)  SpO2: 100% (06 Feb 2024 10:00) (95% - 100%)    O2 Parameters below as of 06 Feb 2024 00:00  Patient On (Oxygen Delivery Method): room air      I&O's Detail    05 Feb 2024 07:01  -  06 Feb 2024 07:00  --------------------------------------------------------  IN:    IV PiggyBack: 100 mL    Lactated Ringers: 125 mL    sodium chloride 0.9%: 650 mL  Total IN: 875 mL    OUT:    Indwelling Catheter - Urethral (mL): 750 mL  Total OUT: 750 mL    Total NET: 125 mL      LABS:                        9.8    19.44 )-----------( 254      ( 06 Feb 2024 05:57 )             31.4     02-06    137  |  107  |  64<H>  ----------------------------<  150<H>  4.4   |  16<L>  |  4.47<H>    Ca    7.9<L>      06 Feb 2024 05:57  Phos  6.4     02-05  Mg     1.8     02-06    TPro  6.6  /  Alb  2.4<L>  /  TBili  0.3  /  DBili  x   /  AST  8<L>  /  ALT  10<L>  /  AlkPhos  82  02-06    CAPILLARY BLOOD GLUCOSE  POCT Blood Glucose.: 110 mg/dL (05 Feb 2024 21:24)    PT/INR - ( 05 Feb 2024 07:14 )   PT: 13.1 sec;   INR: 1.16 ratio         PTT - ( 05 Feb 2024 07:14 )  PTT:32.2 sec  Urinalysis Basic - ( 06 Feb 2024 05:57 )    Color: x / Appearance: x / SG: x / pH: x  Gluc: 150 mg/dL / Ketone: x  / Bili: x / Urobili: x   Blood: x / Protein: x / Nitrite: x   Leuk Esterase: x / RBC: x / WBC x   Sq Epi: x / Non Sq Epi: x / Bacteria: x      CULTURES:  Culture Results:   Testing in progress (02-05 @ 17:06)  Culture Results:   No growth at 24 hours (02-04 @ 13:47)  Culture Results:   No growth at 24 hours (02-04 @ 13:47)      Physical Examination:    General: No acute distress.      HEENT: Pupils equal, reactive to light.  Symmetric.    PULM: Clear to auscultation bilaterally, no significant sputum production    NECK: Supple, no lymphadenopathy, trachea midline    CVS: Regular rate and rhythm, no murmurs, rubs, or gallops    ABD: Soft, nondistended, nontender, normoactive bowel sounds, no masses    EXT: No edema, nontender    SKIN: Warm and well perfused, no rashes noted.    NEURO: Alert, oriented, interactive, nonfocal    DEVICES:     RADIOLOGY: ***    CRITICAL CARE TIME SPENT: ***   Patient is a 73y old  Male who presents with a chief complaint of L facial swelling. (06 Feb 2024 10:10)      BRIEF HOSPITAL COURSE: 74 yo male with PMHx of DM2, HTN, polio, CKD IV, BPH, pulmonary HTN, presents with L facial swelling. Swelling started 3 days ago, and has gotten progressively worse. C/o pain as well. Patient also has discomfort in his nose. Patient took a dose of amoxicillin outpatient.   Admitted for periorbital cellulitis, has b/l opacified maxillary sinuses. started on IV zosyn and IV doxycycline, s/p IV vanco x 1.   Also with b/l hydronephrosis, being seen by urology as outpatient. supposed to have suprapubic catheter placed.     Went to OR for suprapubic cathether placement, during procedure there was rectal injury. Suprapubic catheter not placed.   Intra op colorectal consult  s/p diagnostic laparoscopy with diverting sigmoid loop colostomy 2/5  no intraperitoneal injury    2/6 pt awake and alert, feels pain but cannot quantify it. states he has a high pain tolerance d/t his polio. he states his BP usually runs high "systolic 155 - 170s " as per patient.       PAST MEDICAL & SURGICAL HISTORY:  Polio  Diabetes  Hypertension  No significant past surgical history      Medications:  doxycycline IVPB 100 milliGRAM(s) IV Intermittent every 12 hours  piperacillin/tazobactam IVPB.. 3.375 Gram(s) IV Intermittent every 12 hours  amLODIPine   Tablet 5 milliGRAM(s) Oral daily  acetaminophen     Tablet .. 650 milliGRAM(s) Oral every 6 hours PRN  HYDROmorphone  Injectable 0.5 milliGRAM(s) IV Push every 4 hours PRN  melatonin 3 milliGRAM(s) Oral at bedtime PRN  atorvastatin 40 milliGRAM(s) Oral at bedtime  dextrose 50% Injectable 12.5 Gram(s) IV Push once  dextrose 50% Injectable 25 Gram(s) IV Push once  dextrose 50% Injectable 25 Gram(s) IV Push once  dextrose Oral Gel 15 Gram(s) Oral once PRN  glucagon  Injectable 1 milliGRAM(s) IntraMuscular once  insulin lispro (ADMELOG) corrective regimen sliding scale   SubCutaneous every 6 hours  dextrose 5%. 1000 milliLiter(s) IV Continuous <Continuous>  dextrose 5%. 1000 milliLiter(s) IV Continuous <Continuous>  lactated ringers. 1000 milliLiter(s) IV Continuous <Continuous>  influenza  Vaccine (HIGH DOSE) 0.7 milliLiter(s) IntraMuscular once      ICU Vital Signs Last 24 Hrs  T(C): 36.5 (06 Feb 2024 09:19), Max: 36.7 (06 Feb 2024 00:00)  T(F): 97.7 (06 Feb 2024 09:19), Max: 98 (06 Feb 2024 00:00)  HR: 87 (06 Feb 2024 10:00) (79 - 88)  BP: 169/79 (06 Feb 2024 10:00) (152/69 - 169/79)  BP(mean): 107 (06 Feb 2024 10:00) (92 - 107)  ABP: --  ABP(mean): --  RR: 17 (06 Feb 2024 10:00) (13 - 23)  SpO2: 100% (06 Feb 2024 10:00) (95% - 100%)    O2 Parameters below as of 06 Feb 2024 00:00  Patient On (Oxygen Delivery Method): room air      I&O's Detail    05 Feb 2024 07:01  -  06 Feb 2024 07:00  --------------------------------------------------------  IN:    IV PiggyBack: 100 mL    Lactated Ringers: 125 mL    sodium chloride 0.9%: 650 mL  Total IN: 875 mL    OUT:    Indwelling Catheter - Urethral (mL): 750 mL  Total OUT: 750 mL    Total NET: 125 mL      LABS:                        9.8    19.44 )-----------( 254      ( 06 Feb 2024 05:57 )             31.4     02-06    137  |  107  |  64<H>  ----------------------------<  150<H>  4.4   |  16<L>  |  4.47<H>    Ca    7.9<L>      06 Feb 2024 05:57  Phos  6.4     02-05  Mg     1.8     02-06    TPro  6.6  /  Alb  2.4<L>  /  TBili  0.3  /  DBili  x   /  AST  8<L>  /  ALT  10<L>  /  AlkPhos  82  02-06    CAPILLARY BLOOD GLUCOSE  POCT Blood Glucose.: 110 mg/dL (05 Feb 2024 21:24)    PT/INR - ( 05 Feb 2024 07:14 )   PT: 13.1 sec;   INR: 1.16 ratio         PTT - ( 05 Feb 2024 07:14 )  PTT:32.2 sec  Urinalysis Basic - ( 06 Feb 2024 05:57 )    Color: x / Appearance: x / SG: x / pH: x  Gluc: 150 mg/dL / Ketone: x  / Bili: x / Urobili: x   Blood: x / Protein: x / Nitrite: x   Leuk Esterase: x / RBC: x / WBC x   Sq Epi: x / Non Sq Epi: x / Bacteria: x      CULTURES:  Culture Results:   Testing in progress (02-05 @ 17:06)  Culture Results:   No growth at 24 hours (02-04 @ 13:47)  Culture Results:   No growth at 24 hours (02-04 @ 13:47)      Physical Examination:    General: No acute distress.    HEENT: EOMI b/l. L eyelid mild erythema   PULM: Clear to auscultation bilaterally,  CVS: Regular rate and rhythm, no murmurs,  ABD: Soft, nondistended, incisional tender, normoactive BS, L colostomy - stoma pink, light brown soft stool in bag  EXT: No LE edema,  SKIN: Warm but dry  NEURO: Alert, oriented, interactive    DEVICES:   colostomy  mcconnell     RADIOLOGY:

## 2024-02-06 NOTE — PROGRESS NOTE ADULT - ASSESSMENT
ASSESSMENT  72 yo male with PMHx of DM2, HTN, polio, CKD IV, BPH, pulmonary HTN, presents with L facial swelling.     Admitted for:  1. L. periorbital cellulitis  2. opacified maxillary sinuses b/l  3. hx b/l hydronephrosis      Went to OR for suprapubic cathether placement, during procedure there was rectal injury. Suprapubic catheter not placed.   Intra op colorectal consult    s/p diagnostic laparoscopy with diverting sigmoid loop colostomy 2/5  no intraperitoneal injury    PLAN    Neuro: AAOx 3. pain control prn.   CV: BP elevated. pt states his baseline systolic - 170s?. cont amlodipine 5mg qd.  Pulm: on room air. encourage incentive spirometer.   GI: Clear liquids. adv diet as per surgery. PPI. bowel regimen prn. L colostomy, stoma pink - light brown soft stool in bag  Nephro: C 4.47, cont IV fluids  monitor I & Os. Trend renal fxn  Endo: check a1c. BGMs ac hs. ISS.   Vasc:   MSK:   ID: cont IV zosyn and IV doxycycline. trend WBC. monitor temps. f/u blood and urine cultures.   Heme: DVT ppx -   PT eval    Dispo:    Will discuss with     ASSESSMENT  72 yo male with PMHx of DM2, HTN, polio, CKD IV, BPH, pulmonary HTN, presents with L facial swelling.     Admitted for:  1. L. periorbital cellulitis  2. opacified maxillary sinuses b/l  3. hx b/l hydronephrosis  4. L internal carotid stenosis on imaging.      Went to OR for suprapubic cathether placement, during procedure there was rectal injury. Suprapubic catheter not placed.   Intra op colorectal consult    s/p diagnostic laparoscopy with diverting sigmoid loop colostomy 2/5  no intraperitoneal injury    PLAN    Neuro: AAOx 3. pain control prn. tylenol dilaudid  CV: BP elevated. pt states his baseline systolic - 170s?. cont amlodipine 5mg qd, statin. start ASA 81 if ok with surgery.  Pulm: on room air. encourage incentive spirometer.   GI: Clear liquids. adv diet as per surgery. PPI. bowel regimen prn. L colostomy, stoma pink - light brown soft stool in bag  Nephro: Cr 4.47, worsening metabolic acidosis. changed NS to LR. started on PO bicarb.monitor I & Os. Trend renal fxn. mcconnell to stay in place  Endo: . BGMs ac hs. ISS. for colon bundle  Vasc: US arterial carotid doppler b/l to eval L internal carotid stenosis. vasc consulted.   ID: cont IV zosyn and IV doxycycline. suspect leukocytosis reactive to surgery. cont trend.  monitor temps.   Heme: Hgb stable. start DVT ppx if ok with colorectal   PT eval    Dispo: Stable for floors. IV fluids. PO bicarb. adv diet as per surgery. US carotid dopplers    Will discuss with Dr. Tellez  signed out to [facility]

## 2024-02-06 NOTE — PROGRESS NOTE ADULT - ASSESSMENT
72 y/o M w/ PMH of DM2, HTN, polio, CKD IV, BPH, pulmonary HTN, p/w L facial swelling. Swelling started 3 days ago, and has gotten progressively worse. C/o pain as well. Patient also has discomfort in his nose. Patient took a dose of amoxicillin outpatient. Denies fever, chills, cough, runny nose, sore throat. Also c/p pain in L side of nose. Patient states that he is suppose to have a suprapubic catheter placed soon. Patient states he has urinated since coming to ED. Started on IV vancomycin/zosyn.    1. L periorbital/facial cellulitis. Chronic sinusitis. Leukocytosis. BPH. Urinary retention-Bilateral hydronephrosis. SONDRA on CKD IV  - imaging reviewed, slowly improving  - on zosyn [id number] #2  - on doxycycline #2  - continue with abx coverage  - urology eval noted, s/p attempted spc c/b rectal injury s/p diverting ostomy  - monitor temps  - tolerating abx well so far; no side effects noted  - reason for abx use and side effects reviewed with patient  - f/u cultures  - fu cbc    Clinical team may change from intravenous to oral antibiotics when the following criteria are met:   1. Patient is clinically improving/stable       a)	Improved signs and symptoms of infection from initial presentation       b)	Afebrile for 24 hours       c)	Leukocytosis trending towards normal range   2. Patient is tolerating oral intake   3. Initial blood cultures are negative     When above criteria met may change iv antibiotics to: po doxy/augmentin 100mg BID/[id number] x 7 day course

## 2024-02-07 ENCOUNTER — TRANSCRIPTION ENCOUNTER (OUTPATIENT)
Age: 74
End: 2024-02-07

## 2024-02-07 LAB
-  AZTREONAM: SIGNIFICANT CHANGE UP
-  CEFEPIME: SIGNIFICANT CHANGE UP
-  CEFTAZIDIME: SIGNIFICANT CHANGE UP
-  CIPROFLOXACIN: SIGNIFICANT CHANGE UP
-  IMIPENEM: SIGNIFICANT CHANGE UP
-  LEVOFLOXACIN: SIGNIFICANT CHANGE UP
-  MEROPENEM: SIGNIFICANT CHANGE UP
-  PIPERACILLIN/TAZOBACTAM: SIGNIFICANT CHANGE UP
ALBUMIN SERPL ELPH-MCNC: 2.3 G/DL — LOW (ref 3.3–5)
ALP SERPL-CCNC: 67 U/L — SIGNIFICANT CHANGE UP (ref 40–120)
ALT FLD-CCNC: 8 U/L — LOW (ref 12–78)
ANION GAP SERPL CALC-SCNC: 9 MMOL/L — SIGNIFICANT CHANGE UP (ref 5–17)
AST SERPL-CCNC: 5 U/L — LOW (ref 15–37)
BASOPHILS # BLD AUTO: 0.03 K/UL — SIGNIFICANT CHANGE UP (ref 0–0.2)
BASOPHILS NFR BLD AUTO: 0.2 % — SIGNIFICANT CHANGE UP (ref 0–2)
BILIRUB SERPL-MCNC: 0.2 MG/DL — SIGNIFICANT CHANGE UP (ref 0.2–1.2)
BUN SERPL-MCNC: 68 MG/DL — HIGH (ref 7–23)
CALCIUM SERPL-MCNC: 7.3 MG/DL — LOW (ref 8.5–10.1)
CALCIUM SERPL-MCNC: 7.4 MG/DL — LOW (ref 8.4–10.5)
CHLORIDE SERPL-SCNC: 106 MMOL/L — SIGNIFICANT CHANGE UP (ref 96–108)
CO2 SERPL-SCNC: 19 MMOL/L — LOW (ref 22–31)
CREAT SERPL-MCNC: 4.59 MG/DL — HIGH (ref 0.5–1.3)
EGFR: 13 ML/MIN/1.73M2 — LOW
EOSINOPHIL # BLD AUTO: 0.35 K/UL — SIGNIFICANT CHANGE UP (ref 0–0.5)
EOSINOPHIL NFR BLD AUTO: 2.7 % — SIGNIFICANT CHANGE UP (ref 0–6)
GLUCOSE BLDC GLUCOMTR-MCNC: 136 MG/DL — HIGH (ref 70–99)
GLUCOSE SERPL-MCNC: 121 MG/DL — HIGH (ref 70–99)
HCT VFR BLD CALC: 27.3 % — LOW (ref 39–50)
HGB BLD-MCNC: 8.8 G/DL — LOW (ref 13–17)
IMM GRANULOCYTES NFR BLD AUTO: 0.5 % — SIGNIFICANT CHANGE UP (ref 0–0.9)
LYMPHOCYTES # BLD AUTO: 1.34 K/UL — SIGNIFICANT CHANGE UP (ref 1–3.3)
LYMPHOCYTES # BLD AUTO: 10.3 % — LOW (ref 13–44)
MAGNESIUM SERPL-MCNC: 1.5 MG/DL — LOW (ref 1.6–2.6)
MCHC RBC-ENTMCNC: 29.4 PG — SIGNIFICANT CHANGE UP (ref 27–34)
MCHC RBC-ENTMCNC: 32.2 GM/DL — SIGNIFICANT CHANGE UP (ref 32–36)
MCV RBC AUTO: 91.3 FL — SIGNIFICANT CHANGE UP (ref 80–100)
METHOD TYPE: SIGNIFICANT CHANGE UP
MONOCYTES # BLD AUTO: 1.02 K/UL — HIGH (ref 0–0.9)
MONOCYTES NFR BLD AUTO: 7.8 % — SIGNIFICANT CHANGE UP (ref 2–14)
NEUTROPHILS # BLD AUTO: 10.25 K/UL — HIGH (ref 1.8–7.4)
NEUTROPHILS NFR BLD AUTO: 78.5 % — HIGH (ref 43–77)
PHOSPHATE SERPL-MCNC: 6.3 MG/DL — HIGH (ref 2.5–4.5)
PLATELET # BLD AUTO: 237 K/UL — SIGNIFICANT CHANGE UP (ref 150–400)
POTASSIUM SERPL-MCNC: 3.3 MMOL/L — LOW (ref 3.5–5.3)
POTASSIUM SERPL-SCNC: 3.3 MMOL/L — LOW (ref 3.5–5.3)
PROT SERPL-MCNC: 6.1 GM/DL — SIGNIFICANT CHANGE UP (ref 6–8.3)
PTH-INTACT FLD-MCNC: 483 PG/ML — HIGH (ref 15–65)
RBC # BLD: 2.99 M/UL — LOW (ref 4.2–5.8)
RBC # FLD: 13.8 % — SIGNIFICANT CHANGE UP (ref 10.3–14.5)
SODIUM SERPL-SCNC: 134 MMOL/L — LOW (ref 135–145)
WBC # BLD: 13.06 K/UL — HIGH (ref 3.8–10.5)
WBC # FLD AUTO: 13.06 K/UL — HIGH (ref 3.8–10.5)

## 2024-02-07 PROCEDURE — 99232 SBSQ HOSP IP/OBS MODERATE 35: CPT

## 2024-02-07 RX ORDER — MAGNESIUM OXIDE 400 MG ORAL TABLET 241.3 MG
400 TABLET ORAL ONCE
Refills: 0 | Status: COMPLETED | OUTPATIENT
Start: 2024-02-07 | End: 2024-02-07

## 2024-02-07 RX ORDER — POTASSIUM CHLORIDE 20 MEQ
20 PACKET (EA) ORAL ONCE
Refills: 0 | Status: COMPLETED | OUTPATIENT
Start: 2024-02-07 | End: 2024-02-07

## 2024-02-07 RX ORDER — SEVELAMER CARBONATE 2400 MG/1
800 POWDER, FOR SUSPENSION ORAL
Refills: 0 | Status: DISCONTINUED | OUTPATIENT
Start: 2024-02-07 | End: 2024-02-22

## 2024-02-07 RX ADMIN — Medication 650 MILLIGRAM(S): at 09:47

## 2024-02-07 RX ADMIN — Medication 81 MILLIGRAM(S): at 09:47

## 2024-02-07 RX ADMIN — Medication 100 MILLIGRAM(S): at 18:11

## 2024-02-07 RX ADMIN — Medication 3 MILLIGRAM(S): at 21:59

## 2024-02-07 RX ADMIN — Medication 20 MILLIEQUIVALENT(S): at 17:47

## 2024-02-07 RX ADMIN — Medication 110 MILLIGRAM(S): at 21:59

## 2024-02-07 RX ADMIN — HYDROMORPHONE HYDROCHLORIDE 0.5 MILLIGRAM(S): 2 INJECTION INTRAMUSCULAR; INTRAVENOUS; SUBCUTANEOUS at 14:34

## 2024-02-07 RX ADMIN — PIPERACILLIN AND TAZOBACTAM 25 GRAM(S): 4; .5 INJECTION, POWDER, LYOPHILIZED, FOR SOLUTION INTRAVENOUS at 06:27

## 2024-02-07 RX ADMIN — PIPERACILLIN AND TAZOBACTAM 25 GRAM(S): 4; .5 INJECTION, POWDER, LYOPHILIZED, FOR SOLUTION INTRAVENOUS at 17:46

## 2024-02-07 RX ADMIN — ATORVASTATIN CALCIUM 40 MILLIGRAM(S): 80 TABLET, FILM COATED ORAL at 21:59

## 2024-02-07 RX ADMIN — MAGNESIUM OXIDE 400 MG ORAL TABLET 400 MILLIGRAM(S): 241.3 TABLET ORAL at 17:47

## 2024-02-07 RX ADMIN — Medication 110 MILLIGRAM(S): at 09:46

## 2024-02-07 RX ADMIN — AMLODIPINE BESYLATE 5 MILLIGRAM(S): 2.5 TABLET ORAL at 09:47

## 2024-02-07 RX ADMIN — HYDROMORPHONE HYDROCHLORIDE 0.5 MILLIGRAM(S): 2 INJECTION INTRAMUSCULAR; INTRAVENOUS; SUBCUTANEOUS at 14:49

## 2024-02-07 NOTE — PROGRESS NOTE ADULT - ASSESSMENT
A/P: 73y Male s/p attempted SPT placement    Continue mcconnell for now.   Diet as per colo-rectal  Irrigate mcconnell PRN with normal saline PRN  Above discussed with Dr. Xiao

## 2024-02-07 NOTE — PROGRESS NOTE ADULT - SUBJECTIVE AND OBJECTIVE BOX
Patient is a 73y Male who reports no complaints as new, on 2N. No current co    MEDICATIONS  (STANDING):  amLODIPine   Tablet 5 milliGRAM(s) Oral daily  aspirin enteric coated 81 milliGRAM(s) Oral daily  atorvastatin 40 milliGRAM(s) Oral at bedtime  dextrose 5%. 1000 milliLiter(s) (50 mL/Hr) IV Continuous <Continuous>  dextrose 5%. 1000 milliLiter(s) (100 mL/Hr) IV Continuous <Continuous>  dextrose 50% Injectable 12.5 Gram(s) IV Push once  dextrose 50% Injectable 25 Gram(s) IV Push once  dextrose 50% Injectable 25 Gram(s) IV Push once  doxycycline IVPB 100 milliGRAM(s) IV Intermittent every 12 hours  glucagon  Injectable 1 milliGRAM(s) IntraMuscular once  lactated ringers. 1000 milliLiter(s) (100 mL/Hr) IV Continuous <Continuous>  piperacillin/tazobactam IVPB.. 3.375 Gram(s) IV Intermittent every 12 hours  sodium bicarbonate 650 milliGRAM(s) Oral two times a day    MEDICATIONS  (PRN):  acetaminophen     Tablet .. 650 milliGRAM(s) Oral every 6 hours PRN Mild Pain (1 - 3)  benzonatate 100 milliGRAM(s) Oral every 8 hours PRN Cough  dextrose Oral Gel 15 Gram(s) Oral once PRN Blood Glucose LESS THAN 70 milliGRAM(s)/deciliter  HYDROmorphone  Injectable 0.5 milliGRAM(s) IV Push every 4 hours PRN Severe Pain (7 - 10)  melatonin 3 milliGRAM(s) Oral at bedtime PRN Sleep        T(C): , Max: 36.8 (02-07-24 @ 00:00)  T(F): , Max: 98.2 (02-07-24 @ 00:00)  HR: 88 (02-07-24 @ 16:00)  BP: 153/69 (02-07-24 @ 16:00)  BP(mean): 90 (02-07-24 @ 16:00)  RR: 17 (02-07-24 @ 16:00)  SpO2: 98% (02-07-24 @ 16:00)  Wt(kg): --    02-06 @ 07:01  -  02-07 @ 07:00  --------------------------------------------------------  IN: 50 mL / OUT: 1565 mL / NET: -1515 mL    02-07 @ 07:01  -  02-07 @ 20:04  --------------------------------------------------------  IN: 240 mL / OUT: 500 mL / NET: -260 mL          PHYSICAL EXAM:    Constitutional: NAD, well-groomed, well-developed  HEENT: KATIE, ARCADIOMI,  MMM  Neck: No LAD, No JVD  Respiratory: CTAB  Cardiovascular: S1 and S2, RRR  Gastrointestinal: ostomy  Extremities: No peripheral edema  Neurological: A/O x 3n   : Murillo          LABS:                        8.8    13.06 )-----------( 237      ( 07 Feb 2024 07:22 )             27.3     07 Feb 2024 07:22    134    |  106    |  68     ----------------------------<  121    3.3     |  19     |  4.59   06 Feb 2024 05:57    137    |  107    |  64     ----------------------------<  150    4.4     |  16     |  4.47   05 Feb 2024 23:55    135    |  107    |  65     ----------------------------<  136    3.9     |  17     |  4.45   05 Feb 2024 07:14    136    |  108    |  66     ----------------------------<  90     3.6     |  19     |  4.37   04 Feb 2024 13:47    137    |  107    |  66     ----------------------------<  95     4.0     |  21     |  4.41     Ca    7.3        07 Feb 2024 07:22  Ca    7.9        06 Feb 2024 05:57  Ca    7.6        05 Feb 2024 23:55  Ca    7.7        05 Feb 2024 07:14  Ca    8.2        04 Feb 2024 13:47  Phos  6.3       07 Feb 2024 07:22  Phos  6.4       05 Feb 2024 23:55  Mg     1.5       07 Feb 2024 07:22  Mg     1.8       06 Feb 2024 05:57  Mg     1.8       05 Feb 2024 23:55    TPro  6.1    /  Alb  2.3    /  TBili  0.2    /  DBili  x      /  AST  5      /  ALT  8      /  AlkPhos  67     07 Feb 2024 07:22  TPro  6.6    /  Alb  2.4    /  TBili  0.3    /  DBili  x      /  AST  8      /  ALT  10     /  AlkPhos  82     06 Feb 2024 05:57  TPro  6.4    /  Alb  2.5    /  TBili  0.3    /  DBili  x      /  AST  7      /  ALT  9      /  AlkPhos  86     05 Feb 2024 07:14  TPro  7.7    /  Alb  2.9    /  TBili  0.4    /  DBili  x      /  AST  10     /  ALT  15     /  AlkPhos  101    04 Feb 2024 13:47          Urine Studies:  Urinalysis Basic - ( 07 Feb 2024 07:22 )    Color: x / Appearance: x / SG: x / pH: x  Gluc: 121 mg/dL / Ketone: x  / Bili: x / Urobili: x   Blood: x / Protein: x / Nitrite: x   Leuk Esterase: x / RBC: x / WBC x   Sq Epi: x / Non Sq Epi: x / Bacteria: x            RADIOLOGY & ADDITIONAL STUDIES:

## 2024-02-07 NOTE — DISCHARGE NOTE NURSING/CASE MANAGEMENT/SOCIAL WORK - NSDCPETBCESMAN_GEN_ALL_CORE
If you are a smoker, it is important for your health to stop smoking. Please be aware that second hand smoke is also harmful.
wheezes/rales

## 2024-02-07 NOTE — PROGRESS NOTE ADULT - ASSESSMENT
72 y/o M w/ PMH of DM2, HTN, polio, CKD IV, BPH, pulmonary HTN, p/w L facial swelling with renal evaluation of CKD IV setting of DM and chronic untreated obstructive uropathy.    CKD IV  -Murillo per   -Ostomy w intra op rectal injury  -Creatinine of 4 suspected baseline function, monitor for stability  -IVF if po not at goal, LR ok for now  -NO nsaids, avoid contrast  -Needs close ckd follow up on discharge and eventual avf planning if he is willing.   -Add sevelamer for hyperphos    Acidosis  -Keep oral bicarb, will consider infusion if further decrements.    Cellulitis  -Abx per medical teams    d/c with RN staff, wife  Stressed need to for close fu, ckd managment. He is hesitant and states does not favor physician visits but will consider one or two a year

## 2024-02-07 NOTE — DISCHARGE NOTE NURSING/CASE MANAGEMENT/SOCIAL WORK - PATIENT PORTAL LINK FT
You can access the FollowMyHealth Patient Portal offered by [facility] by registering at the following website: [email or web address]. By joining [facility]’s FollowMyHealth portal, you will also be able to view your health information using other applications (apps) compatible with our system.

## 2024-02-07 NOTE — PROGRESS NOTE ADULT - ATTENDING COMMENTS
S&E    Upset that is receiving a diabetic diet.  He is otherwise tolerating p.o. with good colostomy function.  Colostomy is matured, abdomen is soft, nontender and baseline distended  Labs reviewed  Okay for discharge home with home care services in place for colostomy.
Patient seen and examined.  He is postop day 1 from diverting sigmoid colostomy for rectal injury.  Intraoperative findings reviewed.  He feels well and is hungry.  Colostomy is functioning and his abdomen is otherwise soft nondistended and nontender.  Will advance his diet to solid food.  White blood cell count noted which is likely reactive but will continue to trend.  Continue antibiotics.
duplex results reviewed  I discussed need for intervention with carotid endarterectomy with patient who at this time wants more time to consider, given his recent hospital course he does not want to undergo more operations.  I explained to him and his wife that he is at high risk of embolic events and subsequent potential permanent disability or mortality; he understands and would still like time to decide  can follow up outpatient as of right now  continue medical management

## 2024-02-07 NOTE — PROGRESS NOTE ADULT - SUBJECTIVE AND OBJECTIVE BOX
2/7 - pt seen and examined. pain well controlled.      ROS:   All 10 systems reviewed and found to be negative with the exception of what has been described above.    Vital Signs Last 24 Hrs  T(C): 36.3 (07 Feb 2024 07:45), Max: 36.8 (06 Feb 2024 16:00)  T(F): 97.4 (07 Feb 2024 07:45), Max: 98.2 (06 Feb 2024 16:00)  HR: 83 (07 Feb 2024 07:45) (81 - 87)  BP: 154/67 (07 Feb 2024 07:45) (139/69 - 176/59)  BP(mean): 82 (06 Feb 2024 16:00) (82 - 82)  RR: 16 (07 Feb 2024 07:45) (16 - 18)  SpO2: 97% (07 Feb 2024 07:45) (97% - 99%)    Parameters below as of 07 Feb 2024 07:45  Patient On (Oxygen Delivery Method): room air      GEN: lying in bed, NAD  HEENT:   NC/AT, pupils equal and reactive, EOMI  CV:  +S1, +S2, RRR  RESP:   lungs clear to auscultation bilaterally, no wheeze, rales, rhonchi   BREAST:  not examined  GI:  abdomen soft, non-tender, non-distended, normoactive BS + ostomy   : mcconnell   MSK:   normal muscle tone  EXT:  no edema  NEURO:  AAOX3, no focal neurological deficits  SKIN:  no rashes    Labs:                              8.8    13.06 )-----------( 237      ( 07 Feb 2024 07:22 )             27.3     02-07    134<L>  |  106  |  68<H>  ----------------------------<  121<H>  3.3<L>   |  19<L>  |  4.59<H>    Ca    7.3<L>      07 Feb 2024 07:22  Phos  6.3     02-07  Mg     1.5     02-07    TPro  6.1  /  Alb  2.3<L>  /  TBili  0.2  /  DBili  x   /  AST  5<L>  /  ALT  8<L>  /  AlkPhos  67  02-07        LIVER FUNCTIONS - ( 07 Feb 2024 07:22 )  Alb: 2.3 g/dL / Pro: 6.1 gm/dL / ALK PHOS: 67 U/L / ALT: 8 U/L / AST: 5 U/L / GGT: x             Urinalysis Basic - ( 07 Feb 2024 07:22 )    Color: x / Appearance: x / SG: x / pH: x  Gluc: 121 mg/dL / Ketone: x  / Bili: x / Urobili: x   Blood: x / Protein: x / Nitrite: x   Leuk Esterase: x / RBC: x / WBC x   Sq Epi: x / Non Sq Epi: x / Bacteria: x

## 2024-02-07 NOTE — PROGRESS NOTE ADULT - ASSESSMENT
74 y/o M w/ PMH of DM2, HTN, polio, CKD IV, BPH, pulmonary HTN, p/w L facial swelling.  Patient underwent SPC for hydronephrosis. Course c/b rectal injury now status post diverting ostomy    *L Periorbital cellulitis   -CT reports Extensive nasal and perinasal soft tissue swelling  +soft tissue swelling involves L pre-septal periorbital soft tissues without evidence for post septal extension.   ID consulted.  continue zosyn and doxy day 3  blood cultures negative  Called [facility] for Optho consult (2/5) Patient does not need transfer for eval  Attempted to contact [facility] for ENT: unsuccessful  -CT also reports that both maxillary sinuses are severely opacified w/ possible inspissation vs chronic fungal disease + chronic sinusitis ->       *High grade L internal carotid artery stenosis noted on CT  -Carotid U/S: ordered noted High grade stenosis bilaterally  -Neuro consult  AND VASCULAR recs appreciated  -Patient refused MRI  brain  plan  asa  Statin   Vascular followup with dr sangeeta saavedra upon dc.  spoke with dr saavedra and she recommends follow up as outpt if he chooses to proceed with surgery.      SONDRA due to B/L hydronephrosis + Renal lesion + Suspect acute on CKD IV  -CT reports Marked B/L hydronephrosis increased in severity and extent.  Increased size of a hyperattenuating exophytic right renal lesion 3.8cm.   - and Nephro consulted  -Patient s/p SPC --> OR aborted   plan  monitor. mcconnell in place. mgmt as per urology    Rectal Injury s/p diverting ostomy  Advance diet  Rest of plan as per surgery  - stable from colorectal standpoint for dc     *Hypertension  -Norvasc 5mg PO daily --> uncontrolled --> adding 5 additional  -Low salt diet     *Cholelithaisis  -F/u outpatient     *DM2  -Humalog ISS Q6H     *DVT ppx   -SCDs

## 2024-02-07 NOTE — PROGRESS NOTE ADULT - SUBJECTIVE AND OBJECTIVE BOX
Date of service: 02-06-24 @ 15:00    pt seen and examined  pod #2  has facial tenderness  less facial swelling  no fevers    ROS: no fever or chills; denies dizziness, no HA, no SOB or cough, no legs pain, no rashes    MEDICATIONS  (STANDING):  amLODIPine   Tablet 5 milliGRAM(s) Oral daily  aspirin enteric coated 81 milliGRAM(s) Oral daily  atorvastatin 40 milliGRAM(s) Oral at bedtime  dextrose 5%. 1000 milliLiter(s) (50 mL/Hr) IV Continuous <Continuous>  dextrose 5%. 1000 milliLiter(s) (100 mL/Hr) IV Continuous <Continuous>  dextrose 50% Injectable 12.5 Gram(s) IV Push once  dextrose 50% Injectable 25 Gram(s) IV Push once  dextrose 50% Injectable 25 Gram(s) IV Push once  doxycycline IVPB 100 milliGRAM(s) IV Intermittent every 12 hours  glucagon  Injectable 1 milliGRAM(s) IntraMuscular once  influenza  Vaccine (HIGH DOSE) 0.7 milliLiter(s) IntraMuscular once  insulin lispro (ADMELOG) corrective regimen sliding scale   SubCutaneous every 6 hours  lactated ringers. 1000 milliLiter(s) (100 mL/Hr) IV Continuous <Continuous>  piperacillin/tazobactam IVPB.. 3.375 Gram(s) IV Intermittent every 12 hours  sodium bicarbonate 650 milliGRAM(s) Oral two times a day      Vital Signs Last 24 Hrs  T(C): 36.8 (06 Feb 2024 16:00), Max: 36.8 (06 Feb 2024 16:00)  T(F): 98.2 (06 Feb 2024 16:00), Max: 98.2 (06 Feb 2024 16:00)  HR: 81 (06 Feb 2024 16:00) (79 - 88)  BP: 148/59 (06 Feb 2024 16:00) (148/59 - 176/59)  BP(mean): 82 (06 Feb 2024 16:00) (82 - 107)  RR: 18 (06 Feb 2024 16:00) (13 - 23)  SpO2: 98% (06 Feb 2024 16:00) (95% - 100%)    Parameters below as of 06 Feb 2024 16:00  Patient On (Oxygen Delivery Method): room air      PE:  Constitutional: NAD L periorbital/L facial edema tenderness   HEENT: NC/AT, EOMI, PERRLA, conjunctivae clear; ears and nose atraumatic; pharynx benign  Neck: supple; thyroid not palpable  Back: no tenderness  Respiratory: respiratory effort normal; clear to auscultation  Cardiovascular: S1S2 regular, no murmurs  Abdomen: soft, not tender, not distended, positive BS; +ostomy  Genitourinary: no suprapubic tenderness  Lymphatic: no LN palpable  Musculoskeletal: no muscle tenderness, no joint swelling or tenderness  Extremities: no pedal edema  Neurological/ Psychiatric: AxOx3, Judgement and insight normal;  moving all extremities  Skin: no rashes; no palpable lesions    Labs: all available labs reviewed                                                 8.8    13.06 )-----------( 237      ( 07 Feb 2024 07:22 )             27.3     02-07    134<L>  |  106  |  68<H>  ----------------------------<  121<H>  3.3<L>   |  19<L>  |  4.59<H>    Ca    7.3<L>      07 Feb 2024 07:22  Phos  6.3     02-07  Mg     1.5     02-07    TPro  6.1  /  Alb  2.3<L>  /  TBili  0.2  /  DBili  x   /  AST  5<L>  /  ALT  8<L>  /  AlkPhos  67  02-07         Culture - Blood (02.04.24 @ 13:47)   Specimen Source: .Blood None  Culture Results:   No growth at 48 Hours  Culture - Blood (02.04.24 @ 13:47)   Specimen Source: .Blood None  Culture Results:   No growth at 48 Hours        Radiology: all available radiological tests reviewed  < from: CT Maxillofacial w/ IV Cont (02.04.24 @ 15:45) >    ACC: [id number] EXAM:  CT MAXILLOFACIAL IC   ORDERED BY: ILSA MIRANDA     PROCEDURE DATE:  02/04/2024          INTERPRETATION:  History: Swelling of the left face, question facial   abscess near the nose.  73-year-old with past medical history for   diabetes, hypertension, polio presents emergency department with left   facial swelling. Patient started with symptoms approximately 3 days ago.   Patient noticed the painful area left nose. Now patient with more   swelling left side of face. Patient did take some amoxicillin at home   which helped but did not take last night and now swelling is worse today.    Description: A postcontrast CT scan of the maxillofacial region was   performed.    No prior maxillofacial imaging study was available for comparison at this   Medical Center.    Axial images with coronal and sagittal reformatted series were obtained.    90 cc intravenous Omnipaque 350 contrast was administered.    Extensive nasal and perinasal soft tissue swelling is noted. Soft tissue   swelling also involves the left preseptal periorbital soft tissues,   without evidence for post septal extension at this time. This most likely   reflects a soft tissue infectious phlegmon-cellulitis, without focal   abscess collection at this time.    Both maxillary sinuses are severely opacified. The secretions demonstrate   increased density on the right, which may reflect chronicity-inspissation   versus chronic fungal sinus disease. The walls of the maxillary sinuses   are mildly thickened suggesting a chronic sinusitis component. Mild   mucosal thickening involves the ethmoid sinuses. The sphenoid sinus is   overall well aerated.    Bilateral nasal bone deformities are noted which may reflect old   fractures (there is no submitted clinicalhistory of recent facial   trauma).    A high-grade stenosis involves the origin of the left internal carotid   artery, likely measuring greater than 80% via NASCET criteria. Calcified   plaque and moderate stenosis involves the origin of the right internal   carotid artery. Correlation with carotid ultrasound is recommended.    Impacted horizontally oriented wisdom teeth involve the bilateral   maxilla-floor of the maxillary sinuses.    IMPRESSION:    Extensive nasal and perinasal soft tissue swelling is noted. Soft tissue   swelling also involves the left preseptal periorbital soft tissues,   without evidence for post septal extension at this time. This most likely   reflects a soft tissue infectious phlegmon-cellulitis, without focal   abscess collection at this time.    Both maxillary sinuses are severely opacified. The secretions demonstrate   increased density on the right, which may reflect chronicity-inspissation   versus chronic fungal sinus disease. The walls of the maxillary sinuses   are mildly thickened suggesting a chronic sinusitis component.    A high-grade stenosis involves the origin of the left internal carotid   artery, likely measuring greater than 80% via NASCET criteria. Calcified   plaque and moderate stenosis involves the origin of the right internal   carotid artery. Correlation with carotid ultrasound is recommended.      Advanced directives addressed: full resuscitation

## 2024-02-07 NOTE — PROGRESS NOTE ADULT - SUBJECTIVE AND OBJECTIVE BOX
Pt resting in bed.  Rohith diet. Discussed options for SPT.  No other complaints    Vital Signs Last 24 Hrs  T(C): 36.3 (07 Feb 2024 07:45), Max: 36.8 (06 Feb 2024 16:00)  T(F): 97.4 (07 Feb 2024 07:45), Max: 98.2 (06 Feb 2024 16:00)  HR: 83 (07 Feb 2024 07:45) (81 - 87)  BP: 154/67 (07 Feb 2024 07:45) (139/69 - 176/59)  BP(mean): 82 (06 Feb 2024 16:00) (82 - 97)  RR: 16 (07 Feb 2024 07:45) (16 - 18)  SpO2: 97% (07 Feb 2024 07:45) (97% - 100%)    Parameters below as of 07 Feb 2024 07:45  Patient On (Oxygen Delivery Method): room air        I&O's Summary    06 Feb 2024 07:01  -  07 Feb 2024 07:00  --------------------------------------------------------  IN: 50 mL / OUT: 1565 mL / NET: -1515 mL        Physical Exam  Gen: NAD, A&Ox3  Abd: Soft, NT, ND - Lower abd wound- took off dressing , C/D/I      :Murillo in place - 700cc urine yellow, no clots                          8.8    13.06 )-----------( 237      ( 07 Feb 2024 07:22 )             27.3       02-07    134<L>  |  106  |  68<H>  ----------------------------<  121<H>  3.3<L>   |  19<L>  |  4.59<H>    Ca    7.3<L>      07 Feb 2024 07:22  Phos  6.3     02-07  Mg     1.5     02-07    TPro  6.1  /  Alb  2.3<L>  /  TBili  0.2  /  DBili  x   /  AST  5<L>  /  ALT  8<L>  /  AlkPhos  67  02-07

## 2024-02-07 NOTE — PROGRESS NOTE ADULT - SUBJECTIVE AND OBJECTIVE BOX
Pt POD2 diverting Sigmoid loop colostomy after intra-OR rectal injury  Doing well, pain controlled,, no N/V, tolerated diet, Colostomy is functioning and his abdomen is otherwise soft nondistended and nontender.         PHYSICAL EXAM:  - GENERAL: No acute distress.  - LUNGS: Clear to auscultation bilaterally. No accessory muscle use.  - CARDIOVASCULAR: Regular rate and rhythm. No murmur. No JVD.  - ABDOMEN: Soft, appropriately tender, colosotmy w no function yet       Vitals:  T(C): 36.5 ( @ 04:00), Max: 36.8 ( @ 16:00)  HR: 85 ( 04:00) (81 - 88)  BP: 152/68 ( @ 04:00) (139/69 - 176/59)  RR: 18 ( @ 04:00) (17 - 22)  SpO2: 98% ( @ 04:00) (98% - 100%)      I&Os     @ 07:01  -   @ 07:00  --------------------------------------------------------  IN:    IV PiggyBack: 100 mL    Lactated Ringers: 125 mL    sodium chloride 0.9%: 650 mL  Total IN: 875 mL    OUT:    Indwelling Catheter - Urethral (mL): 750 mL  Total OUT: 750 mL    Total NET: 125 mL       @ 07:01  -   @ 05:12  --------------------------------------------------------  IN:    IV PiggyBack: 50 mL  Total IN: 50 mL    OUT:    Colostomy (mL): 15 mL    Indwelling Catheter - Urethral (mL): 850 mL  Total OUT: 865 mL    Total NET: -815 mL        .    Labs:   @ 05:57                    9.8  CBC: 19.44>)-------(<254                     31.4                 137 | 107 | 64    CMP:  ----------------------< 150               4.4 | 16 | 4.47                      Ca:7.9  Phos:-  M.8               0.3|      |8        LFTs:  ------|82|-----             -|      |-  0205 @ 23:55                    -  CBC: ->)-------(<-                     -                 135 | 107 | 65    CMP:  ----------------------< 136               3.9 | 17 | 4.45                      Ca:7.6  Phos:6.4  M.8               -|      |-        LFTs:  ------|-|-----             -|      |-        Cultures:    Culture - Acid Fast - Urine (collected 24 @ 17:06)  Source: .Urine None    Culture - Fungal, Other (collected 24 @ 17:06)  Source: .Other None  Preliminary Report (24 @ 06:53):    Testing in progress    Culture - Urine (collected 24 @ 17:06)  Source: OR Collect None  Preliminary Report (24 @ 23:03):    >100,000 CFU/ml Pseudomonas aeruginosa    Culture - Blood (collected 24 @ 13:47)  Source: .Blood None  Preliminary Report (24 @ 19:01):    No growth at 48 Hours    Culture - Blood (collected 24 @ 13:47)  Source: .Blood None  Preliminary Report (24 @ 19:01):    No growth at 48 Hours          Current Inpatient Medications:  acetaminophen     Tablet .. 650 milliGRAM(s) Oral every 6 hours PRN  amLODIPine   Tablet 5 milliGRAM(s) Oral daily  aspirin enteric coated 81 milliGRAM(s) Oral daily  atorvastatin 40 milliGRAM(s) Oral at bedtime  benzonatate 100 milliGRAM(s) Oral every 8 hours PRN  dextrose 5%. 1000 milliLiter(s) (50 mL/Hr) IV Continuous <Continuous>  dextrose 5%. 1000 milliLiter(s) (100 mL/Hr) IV Continuous <Continuous>  dextrose 50% Injectable 12.5 Gram(s) IV Push once  dextrose 50% Injectable 25 Gram(s) IV Push once  dextrose 50% Injectable 25 Gram(s) IV Push once  dextrose Oral Gel 15 Gram(s) Oral once PRN  doxycycline IVPB 100 milliGRAM(s) IV Intermittent every 12 hours  glucagon  Injectable 1 milliGRAM(s) IntraMuscular once  HYDROmorphone  Injectable 0.5 milliGRAM(s) IV Push every 4 hours PRN  influenza  Vaccine (HIGH DOSE) 0.7 milliLiter(s) IntraMuscular once  insulin lispro (ADMELOG) corrective regimen sliding scale   SubCutaneous every 6 hours  lactated ringers. 1000 milliLiter(s) (100 mL/Hr) IV Continuous <Continuous>  melatonin 3 milliGRAM(s) Oral at bedtime PRN  piperacillin/tazobactam IVPB.. 3.375 Gram(s) IV Intermittent every 12 hours  sodium bicarbonate 650 milliGRAM(s) Oral two times a day

## 2024-02-07 NOTE — PROGRESS NOTE ADULT - ASSESSMENT
73yoM admitted with facial swelling undergoing suprapubic catheter placement for chronic urinary retention. Colorectal surgery team called intra-operatively for evaluation  now POD2 laparoscopic diverting sigmoid loop colostomy after intra-OR rectal injury  Colostomy is functioning and his abdomen is otherwise soft nondistended and nontender.     Plan:  - ADAT  - Continue antibiotics  - ostomy teaching   - Medical management as per primary team       73yoM admitted with facial swelling undergoing suprapubic catheter placement for chronic urinary retention. Colorectal surgery team called intra-operatively for evaluation  now POD2 laparoscopic diverting sigmoid loop colostomy after intra-OR rectal injury  Colostomy is functioning and his abdomen is otherwise soft nondistended and nontender.     Plan:  - c/w regular low fiber diet  - Continue antibiotics  - ostomy teaching   -  for ostomy care  - Medical management as per primary team  - No further intervention   - Pt is cleared for discharge from colorectal surgery standpoint    Case discussed with Colorectal surgery team

## 2024-02-07 NOTE — PROGRESS NOTE ADULT - ASSESSMENT
72 y/o M w/ PMH of DM2, HTN, polio, CKD IV, BPH, pulmonary HTN, p/w L facial swelling. Swelling started 3 days ago, and has gotten progressively worse. C/o pain as well. Patient also has discomfort in his nose. Patient took a dose of amoxicillin outpatient. Denies fever, chills, cough, runny nose, sore throat. Also c/p pain in L side of nose. Patient states that he is suppose to have a suprapubic catheter placed soon. Patient states he has urinated since coming to ED. Started on IV vancomycin/zosyn.    1. L periorbital/facial cellulitis. Chronic sinusitis. Leukocytosis. BPH. Urinary retention-Bilateral hydronephrosis. SONDRA on CKD IV  - imaging reviewed, slowly improving  - on zosyn [id number] #3  - on doxycycline #3  - continue with abx coverage  - urology eval noted, s/p attempted spc c/b rectal injury s/p diverting ostomy  - monitor temps  - tolerating abx well so far; no side effects noted  - reason for abx use and side effects reviewed with patient  - f/u cultures - blood cx no growth   - fu cbc    Clinical team may change from intravenous to oral antibiotics when the following criteria are met:   1. Patient is clinically improving/stable       a)	Improved signs and symptoms of infection from initial presentation       b)	Afebrile for 24 hours       c)	Leukocytosis trending towards normal range   2. Patient is tolerating oral intake   3. Initial blood cultures are negative     When above criteria met may change iv antibiotics to: po doxy/augmentin 100mg BID/875/[id number] x 7 day course

## 2024-02-07 NOTE — DISCHARGE NOTE NURSING/CASE MANAGEMENT/SOCIAL WORK - NSSCTYPOFSERV_GEN_ALL_CORE
SN [Follow-Up: _____] : a [unfilled] follow-up visit [Other: _____] : [unfilled] [FreeTextEntry1] : for right thalamic infarct 2/2 arterial atherosclerosis

## 2024-02-07 NOTE — DISCHARGE NOTE NURSING/CASE MANAGEMENT/SOCIAL WORK - NSDCPEFALRISK_GEN_ALL_CORE
For information on Fall & Injury Prevention, visit: [email or web address] OR  [email or web address] OR  https://www.cdc.gov/steadi/patient.html

## 2024-02-08 LAB
-  AZTREONAM: SIGNIFICANT CHANGE UP
-  CEFEPIME: SIGNIFICANT CHANGE UP
-  CEFTAZIDIME: SIGNIFICANT CHANGE UP
-  CIPROFLOXACIN: SIGNIFICANT CHANGE UP
-  IMIPENEM: SIGNIFICANT CHANGE UP
-  LEVOFLOXACIN: SIGNIFICANT CHANGE UP
-  MEROPENEM: SIGNIFICANT CHANGE UP
-  PIPERACILLIN/TAZOBACTAM: SIGNIFICANT CHANGE UP
ALBUMIN SERPL ELPH-MCNC: 2.2 G/DL — LOW (ref 3.3–5)
ALP SERPL-CCNC: 68 U/L — SIGNIFICANT CHANGE UP (ref 40–120)
ALT FLD-CCNC: 7 U/L — LOW (ref 12–78)
ANION GAP SERPL CALC-SCNC: 7 MMOL/L — SIGNIFICANT CHANGE UP (ref 5–17)
AST SERPL-CCNC: 6 U/L — LOW (ref 15–37)
BILIRUB SERPL-MCNC: 0.3 MG/DL — SIGNIFICANT CHANGE UP (ref 0.2–1.2)
BUN SERPL-MCNC: 70 MG/DL — HIGH (ref 7–23)
CALCIUM SERPL-MCNC: 7.1 MG/DL — LOW (ref 8.5–10.1)
CHLORIDE SERPL-SCNC: 109 MMOL/L — HIGH (ref 96–108)
CO2 SERPL-SCNC: 19 MMOL/L — LOW (ref 22–31)
CREAT SERPL-MCNC: 4.51 MG/DL — HIGH (ref 0.5–1.3)
CULTURE RESULTS: ABNORMAL
EGFR: 13 ML/MIN/1.73M2 — LOW
GLUCOSE SERPL-MCNC: 107 MG/DL — HIGH (ref 70–99)
HCT VFR BLD CALC: 26.8 % — LOW (ref 39–50)
HGB BLD-MCNC: 8.5 G/DL — LOW (ref 13–17)
MCHC RBC-ENTMCNC: 29.1 PG — SIGNIFICANT CHANGE UP (ref 27–34)
MCHC RBC-ENTMCNC: 31.7 GM/DL — LOW (ref 32–36)
MCV RBC AUTO: 91.8 FL — SIGNIFICANT CHANGE UP (ref 80–100)
METHOD TYPE: SIGNIFICANT CHANGE UP
ORGANISM # SPEC MICROSCOPIC CNT: ABNORMAL
ORGANISM # SPEC MICROSCOPIC CNT: ABNORMAL
ORGANISM # SPEC MICROSCOPIC CNT: SIGNIFICANT CHANGE UP
PLATELET # BLD AUTO: 225 K/UL — SIGNIFICANT CHANGE UP (ref 150–400)
POTASSIUM SERPL-MCNC: 3.3 MMOL/L — LOW (ref 3.5–5.3)
POTASSIUM SERPL-SCNC: 3.3 MMOL/L — LOW (ref 3.5–5.3)
PROT SERPL-MCNC: 6.1 GM/DL — SIGNIFICANT CHANGE UP (ref 6–8.3)
RBC # BLD: 2.92 M/UL — LOW (ref 4.2–5.8)
RBC # FLD: 13.8 % — SIGNIFICANT CHANGE UP (ref 10.3–14.5)
SODIUM SERPL-SCNC: 135 MMOL/L — SIGNIFICANT CHANGE UP (ref 135–145)
SPECIMEN SOURCE: SIGNIFICANT CHANGE UP
WBC # BLD: 13.46 K/UL — HIGH (ref 3.8–10.5)
WBC # FLD AUTO: 13.46 K/UL — HIGH (ref 3.8–10.5)

## 2024-02-08 PROCEDURE — 99233 SBSQ HOSP IP/OBS HIGH 50: CPT

## 2024-02-08 RX ORDER — MAGNESIUM SULFATE 500 MG/ML
1 VIAL (ML) INJECTION ONCE
Refills: 0 | Status: COMPLETED | OUTPATIENT
Start: 2024-02-08 | End: 2024-02-08

## 2024-02-08 RX ORDER — POTASSIUM CHLORIDE 20 MEQ
20 PACKET (EA) ORAL ONCE
Refills: 0 | Status: COMPLETED | OUTPATIENT
Start: 2024-02-08 | End: 2024-02-08

## 2024-02-08 RX ORDER — HEPARIN SODIUM 5000 [USP'U]/ML
5000 INJECTION INTRAVENOUS; SUBCUTANEOUS EVERY 8 HOURS
Refills: 0 | Status: DISCONTINUED | OUTPATIENT
Start: 2024-02-08 | End: 2024-02-13

## 2024-02-08 RX ORDER — MEROPENEM 1 G/30ML
500 INJECTION INTRAVENOUS EVERY 12 HOURS
Refills: 0 | Status: DISCONTINUED | OUTPATIENT
Start: 2024-02-08 | End: 2024-02-08

## 2024-02-08 RX ORDER — AMLODIPINE BESYLATE 2.5 MG/1
10 TABLET ORAL DAILY
Refills: 0 | Status: DISCONTINUED | OUTPATIENT
Start: 2024-02-08 | End: 2024-02-22

## 2024-02-08 RX ORDER — MEROPENEM 1 G/30ML
500 INJECTION INTRAVENOUS EVERY 12 HOURS
Refills: 0 | Status: COMPLETED | OUTPATIENT
Start: 2024-02-08 | End: 2024-02-14

## 2024-02-08 RX ADMIN — Medication 100 GRAM(S): at 11:22

## 2024-02-08 RX ADMIN — SEVELAMER CARBONATE 800 MILLIGRAM(S): 2400 POWDER, FOR SUSPENSION ORAL at 09:55

## 2024-02-08 RX ADMIN — MEROPENEM 500 MILLIGRAM(S): 1 INJECTION INTRAVENOUS at 21:11

## 2024-02-08 RX ADMIN — SEVELAMER CARBONATE 800 MILLIGRAM(S): 2400 POWDER, FOR SUSPENSION ORAL at 18:27

## 2024-02-08 RX ADMIN — Medication 110 MILLIGRAM(S): at 21:10

## 2024-02-08 RX ADMIN — ATORVASTATIN CALCIUM 40 MILLIGRAM(S): 80 TABLET, FILM COATED ORAL at 21:11

## 2024-02-08 RX ADMIN — SEVELAMER CARBONATE 800 MILLIGRAM(S): 2400 POWDER, FOR SUSPENSION ORAL at 13:30

## 2024-02-08 RX ADMIN — Medication 650 MILLIGRAM(S): at 09:55

## 2024-02-08 RX ADMIN — Medication 650 MILLIGRAM(S): at 21:11

## 2024-02-08 RX ADMIN — HYDROMORPHONE HYDROCHLORIDE 0.5 MILLIGRAM(S): 2 INJECTION INTRAMUSCULAR; INTRAVENOUS; SUBCUTANEOUS at 22:52

## 2024-02-08 RX ADMIN — AMLODIPINE BESYLATE 10 MILLIGRAM(S): 2.5 TABLET ORAL at 09:54

## 2024-02-08 RX ADMIN — Medication 20 MILLIEQUIVALENT(S): at 09:54

## 2024-02-08 RX ADMIN — MEROPENEM 500 MILLIGRAM(S): 1 INJECTION INTRAVENOUS at 09:55

## 2024-02-08 RX ADMIN — Medication 110 MILLIGRAM(S): at 09:54

## 2024-02-08 RX ADMIN — Medication 3 MILLIGRAM(S): at 21:11

## 2024-02-08 RX ADMIN — PIPERACILLIN AND TAZOBACTAM 25 GRAM(S): 4; .5 INJECTION, POWDER, LYOPHILIZED, FOR SOLUTION INTRAVENOUS at 05:31

## 2024-02-08 RX ADMIN — Medication 81 MILLIGRAM(S): at 09:54

## 2024-02-08 RX ADMIN — HYDROMORPHONE HYDROCHLORIDE 0.5 MILLIGRAM(S): 2 INJECTION INTRAMUSCULAR; INTRAVENOUS; SUBCUTANEOUS at 23:07

## 2024-02-08 NOTE — PROGRESS NOTE ADULT - SUBJECTIVE AND OBJECTIVE BOX
Patient seen and examined at bedside  He is doing well this morning, pain is controlled, tolerating diet, having ostomy function  Primary complaint is back pain    Vital Signs Last 24 Hrs  T(C): 36.9 (08 Feb 2024 07:58), Max: 37.5 (08 Feb 2024 00:00)  T(F): 98.5 (08 Feb 2024 07:58), Max: 99.5 (08 Feb 2024 00:00)  HR: 89 (08 Feb 2024 07:58) (88 - 94)  BP: 155/62 (08 Feb 2024 07:58) (153/69 - 155/69)  BP(mean): 90 (07 Feb 2024 16:00) (90 - 90)  RR: 18 (08 Feb 2024 07:58) (17 - 18)  SpO2: 100% (08 Feb 2024 07:58) (98% - 100%)    Parameters below as of 08 Feb 2024 07:58  Patient On (Oxygen Delivery Method): room air                          8.5    13.46 )-----------( 225      ( 08 Feb 2024 07:54 )             26.8   02-08    135  |  109<H>  |  70<H>  ----------------------------<  107<H>  3.3<L>   |  19<L>  |  4.51<H>    Ca    7.1<L>      08 Feb 2024 07:54  Phos  6.3     02-07  Mg     1.5     02-07    TPro  6.1  /  Alb  2.2<L>  /  TBili  0.3  /  DBili  x   /  AST  6<L>  /  ALT  7<L>  /  AlkPhos  68  02-08    MEDICATIONS  (STANDING):  amLODIPine   Tablet 10 milliGRAM(s) Oral daily  aspirin enteric coated 81 milliGRAM(s) Oral daily  atorvastatin 40 milliGRAM(s) Oral at bedtime  dextrose 5%. 1000 milliLiter(s) (50 mL/Hr) IV Continuous <Continuous>  dextrose 5%. 1000 milliLiter(s) (100 mL/Hr) IV Continuous <Continuous>  dextrose 50% Injectable 12.5 Gram(s) IV Push once  dextrose 50% Injectable 25 Gram(s) IV Push once  dextrose 50% Injectable 25 Gram(s) IV Push once  doxycycline IVPB 100 milliGRAM(s) IV Intermittent every 12 hours  glucagon  Injectable 1 milliGRAM(s) IntraMuscular once  heparin   Injectable 5000 Unit(s) SubCutaneous every 8 hours  meropenem Injectable 500 milliGRAM(s) IV Push every 12 hours  sevelamer carbonate 800 milliGRAM(s) Oral three times a day with meals  sodium bicarbonate 650 milliGRAM(s) Oral two times a day    MEDICATIONS  (PRN):  acetaminophen     Tablet .. 650 milliGRAM(s) Oral every 6 hours PRN Mild Pain (1 - 3)  benzonatate 100 milliGRAM(s) Oral every 8 hours PRN Cough  dextrose Oral Gel 15 Gram(s) Oral once PRN Blood Glucose LESS THAN 70 milliGRAM(s)/deciliter  HYDROmorphone  Injectable 0.5 milliGRAM(s) IV Push every 4 hours PRN Severe Pain (7 - 10)  melatonin 3 milliGRAM(s) Oral at bedtime PRN Sleep

## 2024-02-08 NOTE — PROGRESS NOTE ADULT - ASSESSMENT
74 y/o M w/ PMH of DM2, HTN, polio, CKD IV, BPH, pulmonary HTN, p/w L facial swelling. Swelling started 3 days ago, and has gotten progressively worse. C/o pain as well. Patient also has discomfort in his nose. Patient took a dose of amoxicillin outpatient. Denies fever, chills, cough, runny nose, sore throat. Also c/p pain in L side of nose. Patient states that he is suppose to have a suprapubic catheter placed soon. Patient states he has urinated since coming to ED. Started on IV vancomycin/zosyn.    1. L periorbital/facial cellulitis. Chronic sinusitis. Leukocytosis. Complicated UTI - PSAE. BPH. Urinary retention-Bilateral hydronephrosis. SONDRA on CKD IV  - imaging reviewed  - s/p zosyn [id number] #3 change abx to meropenem [id number]   - on doxycycline #4  - continue with abx coverage  - urology eval noted, s/p attempted spc c/b rectal injury s/p diverting ostomy  - urine cx growing PSAE I zosyn, change abx to meropenem   - monitor temps  - tolerating abx well so far; no side effects noted  - reason for abx use and side effects reviewed with patient  - f/u cultures - blood cx no growth   - fu cbc    Clinical team may change from intravenous to oral antibiotics when the following criteria are met:   1. Patient is clinically improving/stable       a)	Improved signs and symptoms of infection from initial presentation       b)	Afebrile for 24 hours       c)	Leukocytosis trending towards normal range   2. Patient is tolerating oral intake   3. Initial blood cultures are negative     When above criteria met - may change iv antibiotics to: po doxy/augmentin 100mg BID/875/[id number] x 7 day course, pt will have to complete enough days of merrem for UTI then this regimen

## 2024-02-08 NOTE — PROGRESS NOTE ADULT - ASSESSMENT
73yoM admitted with facial swelling undergoing suprapubic catheter placement for chronic urinary retention. Colorectal surgery team called intra-operatively for evaluation  now POD3 laparoscopic diverting sigmoid loop colostomy after intra-OR rectal injury  Colostomy is functioning and his abdomen is otherwise soft nondistended and nontender.       Continue regular diet  Stoma education  Ok for discharge from a colorectal POV once home health for stoma established

## 2024-02-08 NOTE — PROGRESS NOTE ADULT - SUBJECTIVE AND OBJECTIVE BOX
Date of service: 02-08-24 @ 11:26    pt seen and examined  pod #3  has facial tenderness  feels that face is still swollen  has PSAE growing in urine cx  feels weak   no fevers    ROS: no fever or chills; denies dizziness, no HA, no SOB or cough, no legs pain, no rashes      MEDICATIONS  (STANDING):  amLODIPine   Tablet 10 milliGRAM(s) Oral daily  aspirin enteric coated 81 milliGRAM(s) Oral daily  atorvastatin 40 milliGRAM(s) Oral at bedtime  dextrose 5%. 1000 milliLiter(s) (50 mL/Hr) IV Continuous <Continuous>  dextrose 5%. 1000 milliLiter(s) (100 mL/Hr) IV Continuous <Continuous>  dextrose 50% Injectable 25 Gram(s) IV Push once  dextrose 50% Injectable 12.5 Gram(s) IV Push once  dextrose 50% Injectable 25 Gram(s) IV Push once  doxycycline IVPB 100 milliGRAM(s) IV Intermittent every 12 hours  glucagon  Injectable 1 milliGRAM(s) IntraMuscular once  meropenem Injectable 500 milliGRAM(s) IV Push every 12 hours  sevelamer carbonate 800 milliGRAM(s) Oral three times a day with meals  sodium bicarbonate 650 milliGRAM(s) Oral two times a day    Vital Signs Last 24 Hrs  T(C): 36.9 (08 Feb 2024 07:58), Max: 37.5 (08 Feb 2024 00:00)  T(F): 98.5 (08 Feb 2024 07:58), Max: 99.5 (08 Feb 2024 00:00)  HR: 89 (08 Feb 2024 07:58) (88 - 94)  BP: 155/62 (08 Feb 2024 07:58) (153/69 - 155/69)  BP(mean): 90 (07 Feb 2024 16:00) (90 - 90)  RR: 18 (08 Feb 2024 07:58) (17 - 18)  SpO2: 100% (08 Feb 2024 07:58) (98% - 100%)    Parameters below as of 08 Feb 2024 07:58  Patient On (Oxygen Delivery Method): room air    PE:  Constitutional: NAD L periorbital/L facial edema tenderness   HEENT: NC/AT, EOMI, PERRLA, conjunctivae clear; ears and nose atraumatic; pharynx benign  Neck: supple; thyroid not palpable  Back: no tenderness  Respiratory: respiratory effort normal; clear to auscultation  Cardiovascular: S1S2 regular, no murmurs  Abdomen: soft, not tender, not distended, positive BS; +ostomy  Genitourinary: no suprapubic tenderness  Lymphatic: no LN palpable  Musculoskeletal: no muscle tenderness, no joint swelling or tenderness  Extremities: no pedal edema  Neurological/ Psychiatric: AxOx3, Judgement and insight normal;  moving all extremities  Skin: no rashes; no palpable lesions    Labs: all available labs reviewed                                   8.5    13.46 )-----------( 225      ( 08 Feb 2024 07:54 )             26.8     02-08    135  |  109<H>  |  70<H>  ----------------------------<  107<H>  3.3<L>   |  19<L>  |  4.51<H>    Ca    7.1<L>      08 Feb 2024 07:54  Phos  6.3     02-07  Mg     1.5     02-07    TPro  6.1  /  Alb  2.2<L>  /  TBili  0.3  /  DBili  x   /  AST  6<L>  /  ALT  7<L>  /  AlkPhos  68  02-08         Culture - Blood (02.04.24 @ 13:47)   Specimen Source: .Blood None  Culture Results:   No growth at 48 Hours  Culture - Blood (02.04.24 @ 13:47)   Specimen Source: .Blood None  Culture Results:   No growth at 48 Hours    Culture - Urine (02.05.24 @ 17:06)   - Ceftazidime: S 8  - Meropenem: S <=1  - Piperacillin/Tazobactam: I 32  - Aztreonam: R >16  - Cefepime: I 16  - Ciprofloxacin: R 2  - Imipenem: S 2  - Levofloxacin: R 4  Specimen Source: OR Collect None  Culture Results:   >100,000 CFU/ml Pseudomonas aeruginosa  Organism Identification: Pseudomonas aeruginosa  Organism: Pseudomonas aeruginosa  Method Type: PRIMITIVO    Radiology: all available radiological tests reviewed  < from: CT Maxillofacial w/ IV Cont (02.04.24 @ 15:45) >    ACC: [id number] EXAM:  CT MAXILLOFACIAL IC   ORDERED BY: ILSA MIRANDA     PROCEDURE DATE:  02/04/2024          INTERPRETATION:  History: Swelling of the left face, question facial   abscess near the nose.  73-year-old with past medical history for   diabetes, hypertension, polio presents emergency department with left   facial swelling. Patient started with symptoms approximately 3 days ago.   Patient noticed the painful area left nose. Now patient with more   swelling left side of face. Patient did take some amoxicillin at home   which helped but did not take last night and now swelling is worse today.    Description: A postcontrast CT scan of the maxillofacial region was   performed.    No prior maxillofacial imaging study was available for comparison at this   Medical Center.    Axial images with coronal and sagittal reformatted series were obtained.    90 cc intravenous Omnipaque 350 contrast was administered.    Extensive nasal and perinasal soft tissue swelling is noted. Soft tissue   swelling also involves the left preseptal periorbital soft tissues,   without evidence for post septal extension at this time. This most likely   reflects a soft tissue infectious phlegmon-cellulitis, without focal   abscess collection at this time.    Both maxillary sinuses are severely opacified. The secretions demonstrate   increased density on the right, which may reflect chronicity-inspissation   versus chronic fungal sinus disease. The walls of the maxillary sinuses   are mildly thickened suggesting a chronic sinusitis component. Mild   mucosal thickening involves the ethmoid sinuses. The sphenoid sinus is   overall well aerated.    Bilateral nasal bone deformities are noted which may reflect old   fractures (there is no submitted clinicalhistory of recent facial   trauma).    A high-grade stenosis involves the origin of the left internal carotid   artery, likely measuring greater than 80% via NASCET criteria. Calcified   plaque and moderate stenosis involves the origin of the right internal   carotid artery. Correlation with carotid ultrasound is recommended.    Impacted horizontally oriented wisdom teeth involve the bilateral   maxilla-floor of the maxillary sinuses.    IMPRESSION:    Extensive nasal and perinasal soft tissue swelling is noted. Soft tissue   swelling also involves the left preseptal periorbital soft tissues,   without evidence for post septal extension at this time. This most likely   reflects a soft tissue infectious phlegmon-cellulitis, without focal   abscess collection at this time.    Both maxillary sinuses are severely opacified. The secretions demonstrate   increased density on the right, which may reflect chronicity-inspissation   versus chronic fungal sinus disease. The walls of the maxillary sinuses   are mildly thickened suggesting a chronic sinusitis component.    A high-grade stenosis involves the origin of the left internal carotid   artery, likely measuring greater than 80% via NASCET criteria. Calcified   plaque and moderate stenosis involves the origin of the right internal   carotid artery. Correlation with carotid ultrasound is recommended.      Advanced directives addressed: full resuscitation

## 2024-02-08 NOTE — PROGRESS NOTE ADULT - ASSESSMENT
74 y/o M w/ PMH of DM2, HTN, polio, CKD IV, BPH, pulmonary HTN, p/w L facial swelling with renal evaluation of CKD IV setting of DM and chronic untreated obstructive uropathy.    CKD IV sec to chronic obstructive uropathy    ? CKD progression likely as Cr 3's in 2022     SONDRA on CKD    Cr stable   -Murillo per   -Ostomy w intra op rectal injury  -encourage po - pt is drinking well   -NO nsaids, avoid contrast  - as per Dr Lima - pt is aware he needs close CKD follow up on discharge as pt is high risk for progression to ESRD needing RRT but pt has hx of poor compliance in past   - sevelamer for hyperphos  - pt drinking Coke - change to renal diet now - diet education given     Acidosis   - stable on oral bicarb      Cellulitis  -Abx per medical teams    d/w  RN staff, wife at length     Dr Lima to resume care tomorrow    74 y/o M w/ PMH of DM2, HTN, polio, CKD IV, BPH, pulmonary HTN, p/w L facial swelling with renal evaluation of CKD IV setting of DM and chronic untreated obstructive uropathy.    CKD IV sec to chronic obstructive uropathy    ? CKD progression likely as Cr 3's in 2022  vs SONDRA on CKD 4  monitor for recovery if any     -Murillo per   -Ostomy w intra op rectal injury  -encourage po - pt is drinking well   -NO nsaids, avoid contrast  - sevelamer for hyperphos +  pt drinking Coke - change to renal diet now - diet education given     Hypokalemia  sec to post obs diuresis ?   replete and monitor K /lytes     Acidosis   - stable on oral bicarb      Cellulitis  -Abx per medical teams    d/w  RN staff, wife at length  -   As per Dr Clarence ryan - pt is aware he needs close CKD follow up on discharge -  pt is high risk for progression to ESRD needing RRT but pt has hx of poor compliance in past   Dr Lima to resume care tomorrow

## 2024-02-08 NOTE — PROGRESS NOTE ADULT - SUBJECTIVE AND OBJECTIVE BOX
Patient is a 73y Male who reports no complaints as new      wife at bedside  pt admits he does not seen physicians regularly if at all       MEDICATIONS  (STANDING):  amLODIPine   Tablet 10 milliGRAM(s) Oral daily  aspirin enteric coated 81 milliGRAM(s) Oral daily  atorvastatin 40 milliGRAM(s) Oral at bedtime  dextrose 5%. 1000 milliLiter(s) (50 mL/Hr) IV Continuous <Continuous>  dextrose 5%. 1000 milliLiter(s) (100 mL/Hr) IV Continuous <Continuous>  dextrose 50% Injectable 25 Gram(s) IV Push once  dextrose 50% Injectable 25 Gram(s) IV Push once  dextrose 50% Injectable 12.5 Gram(s) IV Push once  doxycycline IVPB 100 milliGRAM(s) IV Intermittent every 12 hours  glucagon  Injectable 1 milliGRAM(s) IntraMuscular once  heparin   Injectable 5000 Unit(s) SubCutaneous every 8 hours  meropenem Injectable 500 milliGRAM(s) IV Push every 12 hours  sevelamer carbonate 800 milliGRAM(s) Oral three times a day with meals  sodium bicarbonate 650 milliGRAM(s) Oral two times a day      Vital Signs Last 24 Hrs  T(C): 36.9 (08 Feb 2024 16:00), Max: 37.5 (08 Feb 2024 00:00)  T(F): 98.4 (08 Feb 2024 16:00), Max: 99.5 (08 Feb 2024 00:00)  HR: 85 (08 Feb 2024 16:00) (85 - 94)  BP: 149/60 (08 Feb 2024 16:00) (149/60 - 155/69)  BP(mean): 87 (08 Feb 2024 16:00) (87 - 87)  RR: 18 (08 Feb 2024 16:00) (18 - 18)  SpO2: 99% (08 Feb 2024 16:00) (98% - 100%)    Parameters below as of 08 Feb 2024 16:00  Patient On (Oxygen Delivery Method): room air    I&O's Detail    07 Feb 2024 07:01  -  08 Feb 2024 07:00  --------------------------------------------------------  IN:    Oral Fluid: 240 mL  Total IN: 240 mL    OUT:    Colostomy (mL): 0 mL    Indwelling Catheter - Urethral (mL): 2100 mL  Total OUT: 2100 mL    Total NET: -1860 mL      08 Feb 2024 07:01  -  08 Feb 2024 18:36  --------------------------------------------------------  IN:    Oral Fluid: 240 mL  Total IN: 240 mL    OUT:    Colostomy (mL): 500 mL    Indwelling Catheter - Urethral (mL): 1000 mL  Total OUT: 1500 mL    Total NET: -1260 mL      PHYSICAL EXAM:    Constitutional: NAD, well-groomed, well-developed  HEENT: PERRLA, EOMI,  MMM  Neck: No LAD, No JVD  Respiratory: CTAB  Cardiovascular: S1 and S2, RRR  Gastrointestinal: ostomy  Extremities: No peripheral edema  Neurological: A/O x 3n   : Murillo          LABS:                                   8.5    13.46 )-----------( 225      ( 08 Feb 2024 07:54 )             26.8         135    |  109    |  70     ----------------------------<  107       08 Feb 2024 07:54  3.3     |  19     |  4.51     134    |  106    |  68     ----------------------------<  121       07 Feb 2024 07:22  3.3     |  19     |  4.59     137    |  107    |  64     ----------------------------<  150       06 Feb 2024 05:57  4.4     |  16     |  4.47     Ca    7.1        08 Feb 2024 07:54  Ca    7.3        07 Feb 2024 07:22    Phos  6.3       07 Feb 2024 07:22  Phos  6.4       05 Feb 2024 23:55    Mg     1.5       07 Feb 2024 07:22  Mg     1.8       06 Feb 2024 05:57    TPro  6.1    /  Alb  2.2    /  TBili  0.3    /        08 Feb 2024 07:54  DBili  x      /  AST  6      /  ALT  7      /  AlkPhos  68       TPro  6.1    /  Alb  2.3    /  TBili  0.2    /        07 Feb 2024 07:22  DBili  x      /  AST  5      /  ALT  8      /  AlkPhos  67               Urine Studies:  Urinalysis Basic - ( 07 Feb 2024 07:22 )    Color: x / Appearance: x / SG: x / pH: x  Gluc: 121 mg/dL / Ketone: x  / Bili: x / Urobili: x   Blood: x / Protein: x / Nitrite: x   Leuk Esterase: x / RBC: x / WBC x   Sq Epi: x / Non Sq Epi: x / Bacteria: x            RADIOLOGY & ADDITIONAL STUDIES:    ACC: [id number] EXAM:  US KIDNEYS AND BLADDER   ORDERED BY: ALLI HAYES     PROCEDURE DATE:  02/04/2024          INTERPRETATION:  CLINICAL INFORMATION: Evaluate for hydro-    COMPARISON: 7/30/2018    TECHNIQUE: Sonography of the kidneys and bladder.    FINDINGS:  Right kidney: 13.1 cm. upper pole simple appearing cyst measuring 3.6 x   3.2 x 4.4 cm and severe hydronephrosis    Left kidney: 12.4 cm. severe hydronephrosis    Urinary bladder: Only the right ureteral jets were seen. Prevoid volume   is 1871 cc    IMPRESSION:  Right kidney exophytic cyst appears simple. Bilateral severe   hydronephrosis. Only a right ureteral jet was seen. Large prevoid volume

## 2024-02-08 NOTE — PROGRESS NOTE ADULT - SUBJECTIVE AND OBJECTIVE BOX
Chart and meds reviewed.  Patient seen and examined.    2/8 - No events overnight.  He reports persistent swelling of the left face specifically near the upper nose and lower eye lid, + mild headache and dry throat.  No nausea or vomiting.  No neck pain.  No shortness of breath, abdominal pain.     All other systems reviewed and found to be negative with the exception of what has been described above.    MEDICATIONS  (STANDING):  amLODIPine   Tablet 5 milliGRAM(s) Oral daily  aspirin enteric coated 81 milliGRAM(s) Oral daily  atorvastatin 40 milliGRAM(s) Oral at bedtime  dextrose 5%. 1000 milliLiter(s) (50 mL/Hr) IV Continuous <Continuous>  dextrose 5%. 1000 milliLiter(s) (100 mL/Hr) IV Continuous <Continuous>  dextrose 50% Injectable 12.5 Gram(s) IV Push once  dextrose 50% Injectable 25 Gram(s) IV Push once  dextrose 50% Injectable 25 Gram(s) IV Push once  doxycycline IVPB 100 milliGRAM(s) IV Intermittent every 12 hours  glucagon  Injectable 1 milliGRAM(s) IntraMuscular once  lactated ringers. 1000 milliLiter(s) (100 mL/Hr) IV Continuous <Continuous>  piperacillin/tazobactam IVPB.. 3.375 Gram(s) IV Intermittent every 12 hours  sevelamer carbonate 800 milliGRAM(s) Oral three times a day with meals  sodium bicarbonate 650 milliGRAM(s) Oral two times a day    MEDICATIONS  (PRN):  acetaminophen     Tablet .. 650 milliGRAM(s) Oral every 6 hours PRN Mild Pain (1 - 3)  benzonatate 100 milliGRAM(s) Oral every 8 hours PRN Cough  dextrose Oral Gel 15 Gram(s) Oral once PRN Blood Glucose LESS THAN 70 milliGRAM(s)/deciliter  HYDROmorphone  Injectable 0.5 milliGRAM(s) IV Push every 4 hours PRN Severe Pain (7 - 10)  melatonin 3 milliGRAM(s) Oral at bedtime PRN Sleep    VITAL SIGNS:  T(F): 98.5 (02-08-24 @ 07:58), Max: 99.5 (02-08-24 @ 00:00)  HR: 89 (02-08-24 @ 07:58) (88 - 94)  BP: 155/62 (02-08-24 @ 07:58) (153/69 - 155/69)  RR: 18 (02-08-24 @ 07:58) (17 - 18)  SpO2: 100% (02-08-24 @ 07:58) (98% - 100%)    I&O's Summary    07 Feb 2024 07:01  -  08 Feb 2024 07:00  --------------------------------------------------------  IN: 240 mL / OUT: 2100 mL / NET: -1860 mL    08 Feb 2024 07:01  -  08 Feb 2024 08:49  --------------------------------------------------------  IN: 0 mL / OUT: 200 mL / NET: -200 mL    PHYSICAL EXAM:  HEENT:  pupils equal and reactive, EOMI, mild periorbital edema noted, no erythema  NECK:   supple  CV:  +S1, +S2, regular, 1/6 systolic murmur  RESP:  lungs clear to auscultation bilaterally, no wheezing, rales, rhonchi, good air entry bilaterally  GI:  abdomen soft, non-tender, non-distended, normal BS, + colostomy  EXT:  flaccid LEs, mild edema bilaterally, unable to lift legs off the bed  :  + Murillo catheter  NEURO:  AAOX3, unable to move lower legs off the bed, able to move upper extremities, no CN deficits  SKIN:  no ulcers, lesions or rashes    LABS:                        8.8    13.06 )-----------( 237      ( 07 Feb 2024 07:22 )             27.3     02-07    134<L>  |  106  |  68<H>  ----------------------------<  121<H>  3.3<L>   |  19<L>  |  4.59<H>    Ca    7.3<L>      07 Feb 2024 07:22  Phos  6.3     02-07  Mg     1.5     02-07    TPro  6.1  /  Alb  2.3<L>  /  TBili  0.2  /  DBili  x   /  AST  5<L>  /  ALT  8<L>  /  AlkPhos  67  02-07    LIVER FUNCTIONS - ( 07 Feb 2024 07:22 )  Alb: 2.3 g/dL / Pro: 6.1 gm/dL / ALK PHOS: 67 U/L / ALT: 8 U/L / AST: 5 U/L / GGT: x           Urinalysis Basic - ( 07 Feb 2024 07:22 )    Color: x / Appearance: x / SG: x / pH: x  Gluc: 121 mg/dL / Ketone: x  / Bili: x / Urobili: x   Blood: x / Protein: x / Nitrite: x   Leuk Esterase: x / RBC: x / WBC x   Sq Epi: x / Non Sq Epi: x / Bacteria: x    CULTURES:  Feb 4 Blood cultures - negative  Feb 5 Urine culture - positive for Pseudomonas

## 2024-02-08 NOTE — PROGRESS NOTE ADULT - ASSESSMENT
74 yo Male s/p attempted SPT placement on 2/5/24.   UCx with >100,000 CFU/ml Pseudomonas aeruginosa  Discussed options of leaving indwelling mcconnell in place. Patient prefers to have a Suprapubic tube placement and does not wish to have an indwelling urethral catheter long term.   Recommend  - Continue Mcconnell  - Continue Meropenem/antibiotics as per ID  - Consult placed for IR guided suprapubic tube placement on this admission vs. outpatient- Spoke with IR MIR Ruiz  - Diet as per colo-rectal     Case discussed with Dr. Xiao

## 2024-02-08 NOTE — PROGRESS NOTE ADULT - SUBJECTIVE AND OBJECTIVE BOX
Patient seen at bedside, reports he is tolerating the indwelling mcconnell. He denies any abd/flank pain, fevers or chills.    PE  VITALS  T(C): 36.9 (02-08-24 @ 07:58), Max: 37.5 (02-08-24 @ 00:00)  HR: 89 (02-08-24 @ 07:58) (88 - 94)  BP: 155/62 (02-08-24 @ 07:58) (153/69 - 155/69)  RR: 18 (02-08-24 @ 07:58) (17 - 18)  SpO2: 100% (02-08-24 @ 07:58) (98% - 100%)            Skin     : No jaundice  Lung    : No resp distress  Abdo:   : Obese abdomen, Non tender, No distension, + colostomy in place with stool  Genitalia Male: mcconnell with cloudy urine with white sediment draining well  Neuro   : A&Ox3      LABS                        8.5    13.46 )-----------( 225      ( 08 Feb 2024 07:54 )             26.8   02-08    135  |  109<H>  |  70<H>  ----------------------------<  107<H>  3.3<L>   |  19<L>  |  4.51<H>    Ca    7.1<L>      08 Feb 2024 07:54  Phos  6.3     02-07  Mg     1.5     02-07    TPro  6.1  /  Alb  2.2<L>  /  TBili  0.3  /  DBili  x   /  AST  6<L>  /  ALT  7<L>  /  AlkPhos  68  02-08  Culture - Urine (02.05.24 @ 17:06)   - Aztreonam: R >16  - Cefepime: I 16  - Ceftazidime: S 8  - Ciprofloxacin: R 2  - Meropenem: S <=1  - Imipenem: S 2  - Levofloxacin: R 4  - Piperacillin/Tazobactam: I 32  Specimen Source: OR Collect None  Culture Results:   >100,000 CFU/ml Pseudomonas aeruginosa  Organism Identification: Pseudomonas aeruginosa  Organism: Pseudomonas aeruginosa  Method Type: PRIMITIVO

## 2024-02-08 NOTE — PROGRESS NOTE ADULT - ASSESSMENT
73 M with Hx of DM2, HTN, polio with chronic LE paralysis, CKD IV with baseline Cr around 4, BPH, pulmonary HTN was admitted with L facial swelling and left periorbital cellulitis.  He underwent attempt for suprapubic catheter placement for hydronephrosis and SONDRA/CKD.  Hospital course complicated by intraoperative rectal injury, now status post diverting ostomy.    L Periorbital cellulitis  -  appears to be clinically improving on ABXs - Zosyn Day #4 and Doxy Day #4  -  afebrile, WBC coming down and today is 13k  -  continue ABXs per ID, can likely change to PO regimen soon  -  blood cultures from 2/4 are negative  -  CT reports extensive nasal and perinasal soft tissue swelling. +soft tissue swelling involves L pre-septal periorbital soft tissues without evidence for post septal extension.   -  called [facility] for Optho consult (2/5) Patient does not need transfer for evalulation, attempted to contact CTC for ENT - unsuccessful  -  CT also reports that both maxillary sinuses are severely opacified w/ possible inspissation vs chronic fungal disease + chronic sinusitis    High grade Bilateral internal carotid artery stenoses  -  s/p neuro and vascular consults  -  patient has refused MRI/MRA brain at this time  -  continue ASA and statin  -  vascular followup with Dr. Leila Orona upon discharge     SONDRA due to B/L hydronephrosis + Exophytic Renal lesion + Suspected SONDRA on CKD stage IV  -  CT reports marked B/L hydronephrosis increased in severity and extent.  Increased size of a hyperattenuating exophytic right renal lesion 3.8cm  -  s/p  and renal consults  -  s/p attempt for suprapubic catheter placement, complicated by bowel injury requiring procedure to be aborted and now s/p colostomy  -  maintain Murillo in place for now  -  monitor urine output and renal function  -  creatinine appears to be around his baseline  -  avoid NSAIDs and nephrotoxic meds  -  will eventually need a fistula  -  urine Cx from 2/5 is growing Pseudomonas, will d/w ID to change ABXs as Zosyn has intermediate sensitivity only  -  d/c IVFs    S/P Acute Rectal Injury, s/p diverting colostomy  -  wound care per colorectal  -  tolerating diet  -  pain control    Hypertension  -  BP stable, continue Norvasc, increase dose to 10mg daily  -  low salt diet     Cholelithaisis  -  asymptomatic, f/u outpatient     Type 2 Diabetes Mellitus  -  sugars stable, continue Admelog SSI and ADA diet    Metabolic Acidosis  - due to advanced CKD, continue sodium bicarbonate tabs    Anemia of Chronic Disease  -  due to advanced CKD, monitor for now    Hyperphosphatemia  -  due to advanced CKD  -  continue Sevalemer    Hx of Polio  -  has chronic paraplegia  -  frequent repositioning and out of bed to chair to minimize pressure ulcer injuries    DVT prophylaxis  -  venodynes, will also start SQ Heparin, cannot get Lovenox to advanced CKD    Dispo:  OOB to chair, frequent repositioning, possible d/c in 24-48 hours if continues to clinically improve 73 M with Hx of DM2, HTN, polio with chronic LE paralysis, CKD IV with baseline Cr around 4, BPH, pulmonary HTN was admitted with L facial swelling and left periorbital cellulitis.  He underwent attempt for suprapubic catheter placement for hydronephrosis and SONDRA/CKD.  Hospital course complicated by intraoperative rectal injury, now status post diverting ostomy.    L Periorbital cellulitis  -  appears to be clinically improving on ABXs - Zosyn Day #4 and Doxy Day #4  -  afebrile, WBC coming down and today is 13k  -  continue ABXs per ID, can likely change to PO regimen soon  -  blood cultures from 2/4 are negative  -  CT reports extensive nasal and perinasal soft tissue swelling. +soft tissue swelling involves L pre-septal periorbital soft tissues without evidence for post septal extension.   -  called [facility] for Optho consult (2/5) Patient does not need transfer for evalulation, attempted to contact CTC for ENT - unsuccessful  -  CT also reports that both maxillary sinuses are severely opacified w/ possible inspissation vs chronic fungal disease + chronic sinusitis    High grade Bilateral internal carotid artery stenoses  -  s/p neuro and vascular consults  -  patient has refused MRI/MRA brain at this time  -  continue ASA and statin  -  vascular followup with Dr. Leila Orona upon discharge     SONDRA due to B/L hydronephrosis + Exophytic Renal lesion + Suspected SONDRA on CKD stage IV  -  CT reports marked B/L hydronephrosis increased in severity and extent.  Increased size of a hyperattenuating exophytic right renal lesion 3.8cm  -  s/p  and renal consults  -  s/p attempt for suprapubic catheter placement, complicated by bowel injury requiring procedure to be aborted and now s/p colostomy  -  maintain Murillo in place for now  -  monitor urine output and renal function  -  creatinine appears to be around his baseline  -  avoid NSAIDs and nephrotoxic meds  -  will eventually need a fistula  -  urine Cx from 2/5 is growing Pseudomonas, will d/w ID to change ABXs as Zosyn has intermediate sensitivity only  -  d/c IVFs    S/P Acute Rectal Injury, s/p diverting colostomy  -  wound care per colorectal  -  tolerating diet  -  pain control    Hypertension  -  BP stable, continue Norvasc, increase dose to 10mg daily  -  low salt diet     Cholelithaisis  -  asymptomatic, f/u outpatient     Type 2 Diabetes Mellitus  -  sugars stable, continue Admelog SSI and ADA diet    Metabolic Acidosis  - due to advanced CKD, continue sodium bicarbonate tabs    Anemia of Chronic Disease  -  due to advanced CKD, monitor for now    Hyperphosphatemia  -  due to advanced CKD  -  continue Sevalemer    Hx of Polio  -  has chronic paraplegia  -  frequent repositioning and out of bed to chair to minimize pressure ulcer injuries    Hypokalemia  -  replete today    DVT prophylaxis  -  venodynes, will also start SQ Heparin, cannot get Lovenox to advanced CKD    Dispo:  OOB to chair, frequent repositioning, possible d/c in 24-48 hours if continues to clinically improve 73 M with Hx of DM2, HTN, polio with chronic LE paralysis, CKD IV with baseline Cr around 4, BPH, pulmonary HTN was admitted with L facial swelling and left periorbital cellulitis.  He underwent attempt for suprapubic catheter placement for hydronephrosis and SONDRA/CKD.  Hospital course complicated by intraoperative rectal injury, now status post diverting ostomy.    L Periorbital cellulitis  -  appears to be clinically improving on ABXs - Zosyn Day #4 and Doxy Day #4  -  afebrile, WBC coming down and today is 13k  -  continue ABXs per ID, can likely change to PO regimen soon  -  blood cultures from 2/4 are negative  -  CT reports extensive nasal and perinasal soft tissue swelling. +soft tissue swelling involves L pre-septal periorbital soft tissues without evidence for post septal extension.   -  called [facility] for Optho consult (2/5) Patient does not need transfer for evalulation, attempted to contact CTC for ENT - unsuccessful  -  CT also reports that both maxillary sinuses are severely opacified w/ possible inspissation vs chronic fungal disease + chronic sinusitis    High grade Bilateral internal carotid artery stenoses  -  s/p neuro and vascular consults  -  patient has refused MRI/MRA brain at this time  -  continue ASA and statin  -  vascular followup with Dr. Leila Orona upon discharge     SONDRA due to B/L hydronephrosis + Exophytic Renal lesion + Suspected SONDRA on CKD stage IV  -  CT reports marked B/L hydronephrosis increased in severity and extent.  Increased size of a hyperattenuating exophytic right renal lesion 3.8cm  -  s/p  and renal consults  -  s/p attempt for suprapubic catheter placement, complicated by bowel injury requiring procedure to be aborted and now s/p colostomy  -  maintain Murillo in place for now  -  monitor urine output and renal function  -  creatinine appears to be around his baseline  -  avoid NSAIDs and nephrotoxic meds  -  will eventually need a fistula  -  urine Cx from 2/5 is growing Pseudomonas, will d/w ID to change ABXs as Zosyn has intermediate sensitivity only  -  d/c IVFs    S/P Acute Rectal Injury, s/p diverting colostomy  -  wound care per colorectal  -  tolerating diet  -  pain control    Hypertension  -  BP stable, continue Norvasc, increase dose to 10mg daily  -  low salt diet     Cholelithaisis  -  asymptomatic, f/u outpatient     Type 2 Diabetes Mellitus  -  sugars stable, continue Admelog SSI and ADA diet    Metabolic Acidosis  - due to advanced CKD, continue sodium bicarbonate tabs    Anemia of Chronic Disease  -  due to advanced CKD, monitor for now    Hyperphosphatemia  -  due to advanced CKD  -  continue Sevalemer    Hx of Polio  -  has chronic paraplegia  -  frequent repositioning and out of bed to chair to minimize pressure ulcer injuries    Hypokalemia/Hypomagnesemia  -  replete today    DVT prophylaxis  -  venodynes, will also start SQ Heparin, cannot get Lovenox to advanced CKD    Dispo:  OOB to chair, frequent repositioning, possible d/c in 24-48 hours if continues to clinically improve

## 2024-02-09 LAB
ANION GAP SERPL CALC-SCNC: 9 MMOL/L — SIGNIFICANT CHANGE UP (ref 5–17)
BUN SERPL-MCNC: 62 MG/DL — HIGH (ref 7–23)
CALCIUM SERPL-MCNC: 7.4 MG/DL — LOW (ref 8.5–10.1)
CHLORIDE SERPL-SCNC: 105 MMOL/L — SIGNIFICANT CHANGE UP (ref 96–108)
CO2 SERPL-SCNC: 21 MMOL/L — LOW (ref 22–31)
CREAT SERPL-MCNC: 4.28 MG/DL — HIGH (ref 0.5–1.3)
CULTURE RESULTS: SIGNIFICANT CHANGE UP
CULTURE RESULTS: SIGNIFICANT CHANGE UP
EGFR: 14 ML/MIN/1.73M2 — LOW
GLUCOSE SERPL-MCNC: 110 MG/DL — HIGH (ref 70–99)
HCT VFR BLD CALC: 28.2 % — LOW (ref 39–50)
HGB BLD-MCNC: 9 G/DL — LOW (ref 13–17)
MCHC RBC-ENTMCNC: 29.3 PG — SIGNIFICANT CHANGE UP (ref 27–34)
MCHC RBC-ENTMCNC: 31.9 GM/DL — LOW (ref 32–36)
MCV RBC AUTO: 91.9 FL — SIGNIFICANT CHANGE UP (ref 80–100)
PLATELET # BLD AUTO: 227 K/UL — SIGNIFICANT CHANGE UP (ref 150–400)
POTASSIUM SERPL-MCNC: 3.5 MMOL/L — SIGNIFICANT CHANGE UP (ref 3.5–5.3)
POTASSIUM SERPL-SCNC: 3.5 MMOL/L — SIGNIFICANT CHANGE UP (ref 3.5–5.3)
RBC # BLD: 3.07 M/UL — LOW (ref 4.2–5.8)
RBC # FLD: 13.9 % — SIGNIFICANT CHANGE UP (ref 10.3–14.5)
SODIUM SERPL-SCNC: 135 MMOL/L — SIGNIFICANT CHANGE UP (ref 135–145)
SPECIMEN SOURCE: SIGNIFICANT CHANGE UP
SPECIMEN SOURCE: SIGNIFICANT CHANGE UP
WBC # BLD: 12.99 K/UL — HIGH (ref 3.8–10.5)
WBC # FLD AUTO: 12.99 K/UL — HIGH (ref 3.8–10.5)

## 2024-02-09 PROCEDURE — 99221 1ST HOSP IP/OBS SF/LOW 40: CPT

## 2024-02-09 PROCEDURE — 99232 SBSQ HOSP IP/OBS MODERATE 35: CPT

## 2024-02-09 PROCEDURE — 73620 X-RAY EXAM OF FOOT: CPT | Mod: 26,RT

## 2024-02-09 PROCEDURE — 99233 SBSQ HOSP IP/OBS HIGH 50: CPT

## 2024-02-09 PROCEDURE — 93971 EXTREMITY STUDY: CPT | Mod: 26,RT

## 2024-02-09 RX ORDER — HYDRALAZINE HCL 50 MG
10 TABLET ORAL EVERY 6 HOURS
Refills: 0 | Status: DISCONTINUED | OUTPATIENT
Start: 2024-02-09 | End: 2024-02-22

## 2024-02-09 RX ADMIN — AMLODIPINE BESYLATE 10 MILLIGRAM(S): 2.5 TABLET ORAL at 09:46

## 2024-02-09 RX ADMIN — Medication 110 MILLIGRAM(S): at 22:46

## 2024-02-09 RX ADMIN — MEROPENEM 500 MILLIGRAM(S): 1 INJECTION INTRAVENOUS at 10:52

## 2024-02-09 RX ADMIN — MEROPENEM 500 MILLIGRAM(S): 1 INJECTION INTRAVENOUS at 22:48

## 2024-02-09 RX ADMIN — Medication 650 MILLIGRAM(S): at 09:46

## 2024-02-09 RX ADMIN — HYDROMORPHONE HYDROCHLORIDE 0.5 MILLIGRAM(S): 2 INJECTION INTRAMUSCULAR; INTRAVENOUS; SUBCUTANEOUS at 03:10

## 2024-02-09 RX ADMIN — SEVELAMER CARBONATE 800 MILLIGRAM(S): 2400 POWDER, FOR SUSPENSION ORAL at 13:41

## 2024-02-09 RX ADMIN — Medication 81 MILLIGRAM(S): at 09:46

## 2024-02-09 RX ADMIN — Medication 3 MILLIGRAM(S): at 22:34

## 2024-02-09 RX ADMIN — HYDROMORPHONE HYDROCHLORIDE 0.5 MILLIGRAM(S): 2 INJECTION INTRAMUSCULAR; INTRAVENOUS; SUBCUTANEOUS at 02:55

## 2024-02-09 RX ADMIN — Medication 650 MILLIGRAM(S): at 22:32

## 2024-02-09 RX ADMIN — HYDROMORPHONE HYDROCHLORIDE 0.5 MILLIGRAM(S): 2 INJECTION INTRAMUSCULAR; INTRAVENOUS; SUBCUTANEOUS at 06:54

## 2024-02-09 RX ADMIN — HYDROMORPHONE HYDROCHLORIDE 0.5 MILLIGRAM(S): 2 INJECTION INTRAMUSCULAR; INTRAVENOUS; SUBCUTANEOUS at 19:06

## 2024-02-09 RX ADMIN — ATORVASTATIN CALCIUM 40 MILLIGRAM(S): 80 TABLET, FILM COATED ORAL at 22:34

## 2024-02-09 RX ADMIN — Medication 10 MILLIGRAM(S): at 22:44

## 2024-02-09 RX ADMIN — Medication 110 MILLIGRAM(S): at 09:46

## 2024-02-09 NOTE — CONSULT NOTE ADULT - REASON FOR ADMISSION
L facial swelling.

## 2024-02-09 NOTE — CONSULT NOTE ADULT - PROBLEM SELECTOR RECOMMENDATION 9
- Reviewed with Dr. Love, Dr. Laughlin, recommend placement of Suprapubic catheter next Wednesday with anesthesia, which will allow time for the UTI to resolve  - Pt will need to be NPO starting midnight prior  - Repeat AM labs (CBC, CMP, coags)  - Hold ASA 24 hrs prior, Hold heparin 4 hrs prior

## 2024-02-09 NOTE — PROGRESS NOTE ADULT - ASSESSMENT
73 M with Hx of DM2, HTN, polio with chronic LE paralysis, CKD IV with baseline Cr around 4, BPH, pulmonary HTN was admitted with L facial swelling and left periorbital cellulitis.  He underwent attempt for suprapubic catheter placement for hydronephrosis and SONDRA/CKD.  Hospital course complicated by intraoperative rectal injury, now status post diverting ostomy.    L Periorbital cellulitis  -  clinically on ABXs - Imipenem (day #2) and Doxy (day #4)  -  afebrile, WBC coming down to 13k  -  blood cultures from 2/4 are negative  -  CT reports extensive nasal and perinasal soft tissue swelling. +soft tissue swelling involves L pre-septal periorbital soft tissues without evidence for post septal extension.   -  called [facility] for Optho consult (2/5) Patient does not need transfer for evaluation, attempted to contact CTC for ENT - unsuccessful  -  CT also reports that both maxillary sinuses are severely opacified w/ possible inspissation vs chronic fungal disease + chronic sinusitis  -  concerns for developing right heel cellulitis, although I suspect the heel erythema is due to pressure, will obtain xrays of the right heel to start    High grade Bilateral internal carotid artery stenoses  -  s/p neuro and vascular consults  -  patient has refused MRI/MRA brain at this time  -  continue ASA and statin  -  vascular followup with Dr. Leila Orona upon discharge     SONDRA due to B/L hydronephrosis + Exophytic Renal lesion + Suspected SONDRA on CKD stage IV  -  CT reports marked B/L hydronephrosis increased in severity and extent, increased size of a hyperattenuating exophytic right renal lesion 3.8cm  -  s/p  and renal consults  -  s/p attempt for suprapubic catheter placement, complicated by bowel injury requiring procedure to be aborted and now s/p colostomy  -  maintain Murillo in place for now  -  monitor urine output and renal function  -  creatinine appears to be around his baseline  -  avoid NSAIDs and nephrotoxic meds  -  will eventually need a fistula as he is high risk for progression to ESRD  -  spoke to IR, they will evaluate patient, they will plan to do IR SPC placement prior to discharge home    Complicated Pseudomonas UTI (resistant)  -  maintain isolation   -  ABXs changed to Imipenem on 2/8 (day #2)  -  spoke with Dr. Torrez - anticipate at least 5-7 days of IV Meropenem and then change to PO ABXs    S/P Acute Rectal Injury, s/p diverting colostomy  -  wound care per colorectal  -  tolerating diet  -  pain control  -  colostomy care and teaching done by Rakan Padilla    Hypertension  -  BP stable, continue Norvasc, increase dose to 10mg daily  -  low salt diet     Cholelithaisis  -  asymptomatic, f/u outpatient     Type 2 Diabetes Mellitus  -  sugars stable, continue Admelog SSI and ADA diet    Metabolic Acidosis  - due to advanced CKD, stable  -  continue sodium bicarbonate tabs    Anemia of Chronic Disease  -  due to advanced CKD, stable, monitor for now    Hyperphosphatemia  -  due to advanced CKD, continue Sevalemer    Hx of Polio  -  has chronic paraplegia  -  frequent repositioning and out of bed to chair to minimize pressure ulcer injuries    Hypokalemia  -  resolved    DVT prophylaxis  -  venodynes, ordered SQ Heparin, but patient is reluctant to take it, he has been counseled on the risk of developing a DVT/PE while he is bedbound    Dispo:  OOB to chair, frequent repositioning, requires to stay inpatient to complete IV Meropenem course and will need IR guided SPC placement   73 M with Hx of DM2, HTN, polio with chronic LE paralysis, CKD IV with baseline Cr around 4, BPH, pulmonary HTN was admitted with L facial swelling and left periorbital cellulitis.  He underwent attempt for suprapubic catheter placement for hydronephrosis and SONDRA/CKD.  Hospital course complicated by intraoperative rectal injury, now status post diverting ostomy.    L Periorbital cellulitis  -  clinically on ABXs - Imipenem (day #2) and Doxy (day #4)  -  afebrile, WBC coming down to 13k  -  blood cultures from 2/4 are negative  -  CT reports extensive nasal and perinasal soft tissue swelling. +soft tissue swelling involves L pre-septal periorbital soft tissues without evidence for post septal extension.   -  called [facility] for Optho consult (2/5) Patient does not need transfer for evaluation, attempted to contact CTC for ENT - unsuccessful  -  CT also reports that both maxillary sinuses are severely opacified w/ possible inspissation vs chronic fungal disease + chronic sinusitis  -  concerns for developing right heel cellulitis, although I suspect the heel erythema is due to pressure, will obtain xrays of the right heel to start    High grade Bilateral internal carotid artery stenoses  -  s/p neuro and vascular consults  -  patient has refused MRI/MRA brain at this time  -  continue ASA and statin  -  vascular followup with Dr. Leila Orona upon discharge     SONDRA due to B/L hydronephrosis + Exophytic Renal lesion + Suspected SONDRA on CKD stage IV  -  CT reports marked B/L hydronephrosis increased in severity and extent, increased size of a hyperattenuating exophytic right renal lesion 3.8cm  -  s/p  and renal consults  -  s/p attempt for suprapubic catheter placement, complicated by bowel injury requiring procedure to be aborted and now s/p colostomy  -  maintain Murillo in place for now  -  monitor urine output and renal function  -  creatinine appears to be around his baseline  -  avoid NSAIDs and nephrotoxic meds  -  will eventually need a fistula as he is high risk for progression to ESRD  -  spoke to IR, they will evaluate patient, they will plan to do IR SPC placement prior to discharge home    Complicated Pseudomonas UTI (resistant)  -  maintain isolation   -  ABXs changed to Imipenem on 2/8 (day #2)  -  spoke with Dr. Torrez - anticipate at least 5-7 days of IV Meropenem and then change to PO ABXs    S/P Acute Rectal Injury, s/p diverting colostomy  -  wound care per colorectal  -  tolerating diet  -  pain control  -  colostomy care and teaching done by Rakan Padilla    Hypertension  -  BP stable, continue Norvasc, increase dose to 10mg daily  -  low salt diet     Cholelithaisis  -  asymptomatic, f/u outpatient     Type 2 Diabetes Mellitus  -  sugars stable, continue Admelog SSI and ADA diet    Metabolic Acidosis  - due to advanced CKD, stable  -  continue sodium bicarbonate tabs    Anemia of Chronic Disease  -  due to advanced CKD, stable, monitor for now    Hyperphosphatemia  -  due to advanced CKD, continue Sevalemer    Hx of Polio  -  has chronic paraplegia  -  frequent repositioning and out of bed to chair to minimize pressure ulcer injuries    Hypokalemia  -  resolved    DVT prophylaxis  -  venodynes, ordered SQ Heparin, but patient is reluctant to take it, he has been counseled on the risk of developing a DVT/PE while he is bedbound    Dispo:  OOB to chair, frequent repositioning, requires to stay inpatient to complete IV Meropenem course and will need IR guided SPC placement    d/w patient, wife at the bedside, urology PA, RN, Dr. Torrez and Dr. Xiao

## 2024-02-09 NOTE — PROGRESS NOTE ADULT - SUBJECTIVE AND OBJECTIVE BOX
Chart and meds reviewed.  Patient seen and examined.    2/9 - no events overnight.  Tolerating PO.  No nausea or vomiting or shortness of breath.  He reports that he still feels swelling on the left side of his face and his around his eye.  He reports pain in the left heel and is concerned that he is developing an acute cellulitis of the heel which he has had in the past.    All other systems reviewed and found to be negative with the exception of what has been described above.    MEDICATIONS  (STANDING):  amLODIPine   Tablet 10 milliGRAM(s) Oral daily  aspirin enteric coated 81 milliGRAM(s) Oral daily  atorvastatin 40 milliGRAM(s) Oral at bedtime  dextrose 5%. 1000 milliLiter(s) (50 mL/Hr) IV Continuous <Continuous>  dextrose 5%. 1000 milliLiter(s) (100 mL/Hr) IV Continuous <Continuous>  dextrose 50% Injectable 12.5 Gram(s) IV Push once  dextrose 50% Injectable 25 Gram(s) IV Push once  dextrose 50% Injectable 25 Gram(s) IV Push once  doxycycline IVPB 100 milliGRAM(s) IV Intermittent every 12 hours  glucagon  Injectable 1 milliGRAM(s) IntraMuscular once  heparin   Injectable 5000 Unit(s) SubCutaneous every 8 hours  meropenem Injectable 500 milliGRAM(s) IV Push every 12 hours  sevelamer carbonate 800 milliGRAM(s) Oral three times a day with meals  sodium bicarbonate 650 milliGRAM(s) Oral two times a day    MEDICATIONS  (PRN):  acetaminophen     Tablet .. 650 milliGRAM(s) Oral every 6 hours PRN Mild Pain (1 - 3)  benzonatate 100 milliGRAM(s) Oral every 8 hours PRN Cough  dextrose Oral Gel 15 Gram(s) Oral once PRN Blood Glucose LESS THAN 70 milliGRAM(s)/deciliter  HYDROmorphone  Injectable 0.5 milliGRAM(s) IV Push every 4 hours PRN Severe Pain (7 - 10)  melatonin 3 milliGRAM(s) Oral at bedtime PRN Sleep    VITAL SIGNS:  T(F): 98.1 (02-09-24 @ 07:55), Max: 98.4 (02-08-24 @ 16:00)  HR: 83 (02-09-24 @ 07:55) (83 - 85)  BP: 153/70 (02-09-24 @ 07:55) (149/60 - 153/70)  RR: 16 (02-09-24 @ 07:55) (16 - 18)  SpO2: 99% (02-09-24 @ 07:55) (99% - 99%)    I&O's Summary    08 Feb 2024 07:01  -  09 Feb 2024 07:00  --------------------------------------------------------  IN: 240 mL / OUT: 3800 mL / NET: -3560 mL    PHYSICAL EXAM:  HEENT:  pupils equal and reactive, EOMI, no oropharyngeal lesions, erythema, exudates, oral thrush  NECK:   supple, no carotid bruits, no palpable lymph nodes, no thyromegaly  CV:  +S1, +S2, regular, 1/6 systolic murmur, no rubs  RESP:   lungs clear to auscultation bilaterally, no wheezing, rales, rhonchi, good air entry bilaterally  GI:  abdomen soft, non-tender, non-distended, normal BS, + colostomy  EXT:  flaccid LEs, + mild edema bilaterally, mild erythema over the left heel, + tenderness, no open or draining areas  NEURO:  AAOX3, no focal neurological deficits, follows all commands, + paralysis of the lower extremities  SKIN:  no ulcers, lesions or rashes    LABS:                        9.0    12.99 )-----------( 227      ( 09 Feb 2024 07:04 )             28.2     02-09    135  |  105  |  62<H>  ----------------------------<  110<H>  3.5   |  21<L>  |  4.28<H>    Ca    7.4<L>      09 Feb 2024 07:04    TPro  6.1  /  Alb  2.2<L>  /  TBili  0.3  /  DBili  x   /  AST  6<L>  /  ALT  7<L>  /  AlkPhos  68  02-08    LIVER FUNCTIONS - ( 08 Feb 2024 07:54 )  Alb: 2.2 g/dL / Pro: 6.1 gm/dL / ALK PHOS: 68 U/L / ALT: 7 U/L / AST: 6 U/L / GGT: x           Urinalysis Basic - ( 09 Feb 2024 07:04 )    Color: x / Appearance: x / SG: x / pH: x  Gluc: 110 mg/dL / Ketone: x  / Bili: x / Urobili: x   Blood: x / Protein: x / Nitrite: x   Leuk Esterase: x / RBC: x / WBC x   Sq Epi: x / Non Sq Epi: x / Bacteria: x

## 2024-02-09 NOTE — PROGRESS NOTE ADULT - SUBJECTIVE AND OBJECTIVE BOX
PT resting in bed, tolerating Mcconnell cathter,  He denies any CP, dyspnea,  abd pain, N/V. C/O R heel pain     PE  Vital Signs Last 24 Hrs  T(C): 36.7 (09 Feb 2024 07:55), Max: 36.9 (08 Feb 2024 16:00)  T(F): 98.1 (09 Feb 2024 07:55), Max: 98.4 (08 Feb 2024 16:00)  HR: 83 (09 Feb 2024 07:55) (83 - 85)  BP: 153/70 (09 Feb 2024 07:55) (149/60 - 153/70)  BP(mean): 87 (08 Feb 2024 16:00) (87 - 87)  RR: 16 (09 Feb 2024 07:55) (16 - 18)  SpO2: 99% (09 Feb 2024 07:55) (99% - 99%)    Parameters below as of 09 Feb 2024 07:55  Patient On (Oxygen Delivery Method): room air     Lung    : No resp distress    Abd:  : Incisions C/D/I,  Non tender, No distension, + colostomy in place with +  stool in bag     Genitalia :  mcconnell draining  cloudy urine with white sediment draining well    Neuro   : A&Ox3      LABS              9.0    12.99 )-----------( 227      ( 09 Feb 2024 07:04 )             28.2     02-09    135  |  105  |  62<H>  ----------------------------<  110<H>  3.5   |  21<L>  |  4.28<H>    Ca    7.4<L>      09 Feb 2024 07:04    TPro  6.1  /  Alb  2.2<L>  /  TBili  0.3  /  DBili  x   /  AST  6<L>  /  ALT  7<L>  /  AlkPhos  68  02-08        LIVER FUNCTIONS - ( 08 Feb 2024 07:54 )  Alb: 2.2 g/dL / Pro: 6.1 gm/dL / ALK PHOS: 68 U/L / ALT: 7 U/L / AST: 6 U/L / GGT: x             Urinalysis Basic - ( 09 Feb 2024 07:04 )    Color: x / Appearance: x / SG: x / pH: x  Gluc: 110 mg/dL / Ketone: x  / Bili: x / Urobili: x   Blood: x / Protein: x / Nitrite: x   Leuk Esterase: x / RBC: x / WBC x   Sq Epi: x / Non Sq Epi: x / Bacteria: x    MEDICATIONS  (STANDING):  amLODIPine   Tablet 10 milliGRAM(s) Oral daily  aspirin enteric coated 81 milliGRAM(s) Oral daily  atorvastatin 40 milliGRAM(s) Oral at bedtime  dextrose 5%. 1000 milliLiter(s) (50 mL/Hr) IV Continuous <Continuous>  dextrose 5%. 1000 milliLiter(s) (100 mL/Hr) IV Continuous <Continuous>  dextrose 50% Injectable 12.5 Gram(s) IV Push once  dextrose 50% Injectable 25 Gram(s) IV Push once  dextrose 50% Injectable 25 Gram(s) IV Push once  doxycycline IVPB 100 milliGRAM(s) IV Intermittent every 12 hours  glucagon  Injectable 1 milliGRAM(s) IntraMuscular once  heparin   Injectable 5000 Unit(s) SubCutaneous every 8 hours  meropenem Injectable 500 milliGRAM(s) IV Push every 12 hours  sevelamer carbonate 800 milliGRAM(s) Oral three times a day with meals  sodium bicarbonate 650 milliGRAM(s) Oral two times a day    MEDICATIONS  (PRN):  acetaminophen     Tablet .. 650 milliGRAM(s) Oral every 6 hours PRN Mild Pain (1 - 3)  benzonatate 100 milliGRAM(s) Oral every 8 hours PRN Cough  dextrose Oral Gel 15 Gram(s) Oral once PRN Blood Glucose LESS THAN 70 milliGRAM(s)/deciliter  HYDROmorphone  Injectable 0.5 milliGRAM(s) IV Push every 4 hours PRN Severe Pain (7 - 10)  melatonin 3 milliGRAM(s) Oral at bedtime PRN Sleep      A/P:  	  74 yo Male s/p attempted SPT placement on 2/5/24.   UCx with >100,000 CFU/ml Pseudomonas aeruginosa  Pt does not want long term indwelling Mcconnell  Awaiting IR evaluation/placement of SPT  Pt requests this done prior to his discharge  Continue Mcconnell  Continue IV Abx/Meropenem as per ID  Diet as per colo-rectal  Xrays ordered for new R heel pain  Case discussed with Dr Enciso and Dr. Xiao

## 2024-02-09 NOTE — CONSULT NOTE ADULT - CONSULT REQUESTED DATE/TIME
05-Feb-2024
05-Feb-2024 18:12
05-Feb-2024 23:48
05-Feb-2024 09:16
05-Feb-2024 19:35
05-Feb-2024 21:36
09-Feb-2024 13:45
05-Feb-2024 13:10

## 2024-02-09 NOTE — CONSULT NOTE ADULT - PROVIDER SPECIALTY LIST ADULT
Vascular Surgery
Neurology
Colorectal Surgery
Critical Care
Nephrology
Intervent Radiology
Urology
Infectious Disease

## 2024-02-09 NOTE — CONSULT NOTE ADULT - ASSESSMENT
74yo M with BPH, referred to IR for placement of suprapubic tube  - Pt currently being treated for pseudomonas UTI, has several days left on treatment course  - Receives ASA/heparin

## 2024-02-09 NOTE — CONSULT NOTE ADULT - CONSULT REASON
CKD IV
L facial cellulitis
Distended bladder- suprapubic tube placement
Intra-operative consult
74yo M with BPH, referred to IR for placement of suprapubic tube
Lt ICA stenosis
Left carotid stenosis
sp rectal injury sp diverting sigmoid loop colostomy following attempted suprapubic catheter placement

## 2024-02-09 NOTE — CONSULT NOTE ADULT - SUBJECTIVE AND OBJECTIVE BOX
Chief Complaint:  Patient is a 73y old  Male who presents with a chief complaint BPH      HPI:  72 y/o M w/ PMH of DM2, HTN, polio, CKD IV, BPH, pulmonary HTN, p/w L facial swelling, admitted for treatment of left periorbital cellulitis. Pt also with history of unsuccessful suprapubic catheter placement with Dr. Xiao. IR consulted for possible placement of catheter prior to discharge. Pt seen at bedside, with no complaints. Denied fever, CP, SOB, NVD.     Allergies  No Known Allergies    MEDICATIONS  (STANDING):  amLODIPine   Tablet 10 milliGRAM(s) Oral daily  aspirin enteric coated 81 milliGRAM(s) Oral daily  atorvastatin 40 milliGRAM(s) Oral at bedtime  dextrose 5%. 1000 milliLiter(s) (50 mL/Hr) IV Continuous <Continuous>  dextrose 5%. 1000 milliLiter(s) (100 mL/Hr) IV Continuous <Continuous>  dextrose 50% Injectable 12.5 Gram(s) IV Push once  dextrose 50% Injectable 25 Gram(s) IV Push once  dextrose 50% Injectable 25 Gram(s) IV Push once  doxycycline IVPB 100 milliGRAM(s) IV Intermittent every 12 hours  glucagon  Injectable 1 milliGRAM(s) IntraMuscular once  heparin   Injectable 5000 Unit(s) SubCutaneous every 8 hours  meropenem Injectable 500 milliGRAM(s) IV Push every 12 hours  sevelamer carbonate 800 milliGRAM(s) Oral three times a day with meals  sodium bicarbonate 650 milliGRAM(s) Oral two times a day    MEDICATIONS  (PRN):  acetaminophen     Tablet .. 650 milliGRAM(s) Oral every 6 hours PRN Mild Pain (1 - 3)  benzonatate 100 milliGRAM(s) Oral every 8 hours PRN Cough  dextrose Oral Gel 15 Gram(s) Oral once PRN Blood Glucose LESS THAN 70 milliGRAM(s)/deciliter  HYDROmorphone  Injectable 0.5 milliGRAM(s) IV Push every 4 hours PRN Severe Pain (7 - 10)  melatonin 3 milliGRAM(s) Oral at bedtime PRN Sleep      PAST MEDICAL & SURGICAL HISTORY:  Polio      Diabetes      Hypertension      No significant past surgical history          FAMILY HISTORY:  No pertinent family history in first degree relatives        Review of Systems:  As per HPI      Vital Signs Last 24 Hrs  T(C): 36.7 (09 Feb 2024 07:55), Max: 36.9 (08 Feb 2024 16:00)  T(F): 98.1 (09 Feb 2024 07:55), Max: 98.4 (08 Feb 2024 16:00)  HR: 83 (09 Feb 2024 07:55) (83 - 85)  BP: 153/70 (09 Feb 2024 07:55) (149/60 - 153/70)  BP(mean): 87 (08 Feb 2024 16:00) (87 - 87)  RR: 16 (09 Feb 2024 07:55) (16 - 18)  SpO2: 99% (09 Feb 2024 07:55) (99% - 99%)    Parameters below as of 09 Feb 2024 07:55  Patient On (Oxygen Delivery Method): room air      GENERAL APPEARANCE: Well developed, in no acute distress.    LUNGS: Auscultation of the lungs revealed normal breath sounds without any other adventitious sounds or rubs.    CARDIOVASCULAR: There was a regular rate and rhythm    ABDOMEN: Soft and nontender with normal bowel sounds.     NEUROLOGIC: Alert and oriented x 3. Normal affect.       CBC                        9.0    12.99 )-----------( 227      ( 09 Feb 2024 07:04 )             28.2       Chemistry  02-09    135  |  105  |  62<H>  ----------------------------<  110<H>  3.5   |  21<L>  |  4.28<H>    Ca    7.4<L>      09 Feb 2024 07:04    TPro  6.1  /  Alb  2.2<L>  /  TBili  0.3  /  DBili  x   /  AST  6<L>  /  ALT  7<L>  /  AlkPhos  68  02-08

## 2024-02-10 LAB
ADD ON TEST-SPECIMEN IN LAB: SIGNIFICANT CHANGE UP
ANION GAP SERPL CALC-SCNC: 9 MMOL/L — SIGNIFICANT CHANGE UP (ref 5–17)
BUN SERPL-MCNC: 57 MG/DL — HIGH (ref 7–23)
CALCIUM SERPL-MCNC: 8 MG/DL — LOW (ref 8.5–10.1)
CHLORIDE SERPL-SCNC: 107 MMOL/L — SIGNIFICANT CHANGE UP (ref 96–108)
CO2 SERPL-SCNC: 20 MMOL/L — LOW (ref 22–31)
CREAT SERPL-MCNC: 3.93 MG/DL — HIGH (ref 0.5–1.3)
EGFR: 15 ML/MIN/1.73M2 — LOW
GLUCOSE SERPL-MCNC: 126 MG/DL — HIGH (ref 70–99)
HCT VFR BLD CALC: 28.3 % — LOW (ref 39–50)
HGB BLD-MCNC: 9.2 G/DL — LOW (ref 13–17)
MAGNESIUM SERPL-MCNC: 1.6 MG/DL — SIGNIFICANT CHANGE UP (ref 1.6–2.6)
MCHC RBC-ENTMCNC: 29.3 PG — SIGNIFICANT CHANGE UP (ref 27–34)
MCHC RBC-ENTMCNC: 32.5 GM/DL — SIGNIFICANT CHANGE UP (ref 32–36)
MCV RBC AUTO: 90.1 FL — SIGNIFICANT CHANGE UP (ref 80–100)
PHOSPHATE SERPL-MCNC: 4.5 MG/DL — SIGNIFICANT CHANGE UP (ref 2.5–4.5)
PLATELET # BLD AUTO: 240 K/UL — SIGNIFICANT CHANGE UP (ref 150–400)
POTASSIUM SERPL-MCNC: 3.6 MMOL/L — SIGNIFICANT CHANGE UP (ref 3.5–5.3)
POTASSIUM SERPL-SCNC: 3.6 MMOL/L — SIGNIFICANT CHANGE UP (ref 3.5–5.3)
RBC # BLD: 3.14 M/UL — LOW (ref 4.2–5.8)
RBC # FLD: 13.6 % — SIGNIFICANT CHANGE UP (ref 10.3–14.5)
SODIUM SERPL-SCNC: 136 MMOL/L — SIGNIFICANT CHANGE UP (ref 135–145)
WBC # BLD: 14.94 K/UL — HIGH (ref 3.8–10.5)
WBC # FLD AUTO: 14.94 K/UL — HIGH (ref 3.8–10.5)

## 2024-02-10 PROCEDURE — 99233 SBSQ HOSP IP/OBS HIGH 50: CPT

## 2024-02-10 RX ORDER — OXYCODONE HYDROCHLORIDE 5 MG/1
10 TABLET ORAL EVERY 6 HOURS
Refills: 0 | Status: DISCONTINUED | OUTPATIENT
Start: 2024-02-10 | End: 2024-02-17

## 2024-02-10 RX ORDER — MAGNESIUM SULFATE 500 MG/ML
1 VIAL (ML) INJECTION ONCE
Refills: 0 | Status: COMPLETED | OUTPATIENT
Start: 2024-02-10 | End: 2024-02-10

## 2024-02-10 RX ORDER — OXYCODONE HYDROCHLORIDE 5 MG/1
5 TABLET ORAL EVERY 6 HOURS
Refills: 0 | Status: DISCONTINUED | OUTPATIENT
Start: 2024-02-10 | End: 2024-02-11

## 2024-02-10 RX ORDER — GABAPENTIN 400 MG/1
200 CAPSULE ORAL AT BEDTIME
Refills: 0 | Status: DISCONTINUED | OUTPATIENT
Start: 2024-02-10 | End: 2024-02-11

## 2024-02-10 RX ADMIN — MEROPENEM 500 MILLIGRAM(S): 1 INJECTION INTRAVENOUS at 10:21

## 2024-02-10 RX ADMIN — AMLODIPINE BESYLATE 10 MILLIGRAM(S): 2.5 TABLET ORAL at 10:21

## 2024-02-10 RX ADMIN — MEROPENEM 500 MILLIGRAM(S): 1 INJECTION INTRAVENOUS at 21:50

## 2024-02-10 RX ADMIN — SEVELAMER CARBONATE 800 MILLIGRAM(S): 2400 POWDER, FOR SUSPENSION ORAL at 12:35

## 2024-02-10 RX ADMIN — Medication 3 MILLIGRAM(S): at 21:50

## 2024-02-10 RX ADMIN — Medication 10 MILLIGRAM(S): at 08:16

## 2024-02-10 RX ADMIN — HYDROMORPHONE HYDROCHLORIDE 0.5 MILLIGRAM(S): 2 INJECTION INTRAMUSCULAR; INTRAVENOUS; SUBCUTANEOUS at 08:15

## 2024-02-10 RX ADMIN — ATORVASTATIN CALCIUM 40 MILLIGRAM(S): 80 TABLET, FILM COATED ORAL at 21:50

## 2024-02-10 RX ADMIN — Medication 110 MILLIGRAM(S): at 21:13

## 2024-02-10 RX ADMIN — SEVELAMER CARBONATE 800 MILLIGRAM(S): 2400 POWDER, FOR SUSPENSION ORAL at 17:13

## 2024-02-10 RX ADMIN — Medication 110 MILLIGRAM(S): at 10:20

## 2024-02-10 RX ADMIN — Medication 650 MILLIGRAM(S): at 21:50

## 2024-02-10 RX ADMIN — Medication 100 GRAM(S): at 19:52

## 2024-02-10 RX ADMIN — SEVELAMER CARBONATE 800 MILLIGRAM(S): 2400 POWDER, FOR SUSPENSION ORAL at 08:16

## 2024-02-10 RX ADMIN — OXYCODONE HYDROCHLORIDE 5 MILLIGRAM(S): 5 TABLET ORAL at 18:51

## 2024-02-10 RX ADMIN — HYDROMORPHONE HYDROCHLORIDE 0.5 MILLIGRAM(S): 2 INJECTION INTRAMUSCULAR; INTRAVENOUS; SUBCUTANEOUS at 07:58

## 2024-02-10 RX ADMIN — OXYCODONE HYDROCHLORIDE 5 MILLIGRAM(S): 5 TABLET ORAL at 19:25

## 2024-02-10 RX ADMIN — Medication 650 MILLIGRAM(S): at 10:21

## 2024-02-10 RX ADMIN — GABAPENTIN 200 MILLIGRAM(S): 400 CAPSULE ORAL at 21:50

## 2024-02-10 RX ADMIN — Medication 81 MILLIGRAM(S): at 10:21

## 2024-02-10 NOTE — PROGRESS NOTE ADULT - SUBJECTIVE AND OBJECTIVE BOX
CC: L facial swelling. (10 Feb 2024 13:05)    HPI:  72 y/o M w/ PMH of DM2, HTN, polio, CKD IV, BPH, pulmonary HTN, p/w L facial swelling. Swelling started 3 days ago, and has gotten progressively worse. C/o pain as well. Patient also has discomfort in his nose. Patient took a dose of amoxicillin outpatient. Denies fever, chills, cough, runny nose, sore throat. Also c/p pain in L side of nose. Patient states that he is suppose to have a suprapubic catheter placed soon. Patient states he has urinated since coming to ED       PSH: R hip surgery, shoulder surgeries, L tibia ORIF     Social Hx: Denies tobacco / etoh / drugs     Family Hx: Father - DM  (05 Feb 2024 00:30)    INTERVAL HPI/OVERNIGHT EVENTS:    Vital Signs Last 24 Hrs  T(C): 36.7 (10 Feb 2024 08:31), Max: 37.7 (09 Feb 2024 21:31)  T(F): 98.1 (10 Feb 2024 08:31), Max: 99.9 (09 Feb 2024 21:31)  HR: 89 (10 Feb 2024 08:31) (86 - 94)  BP: 161/85 (10 Feb 2024 08:31) (145/52 - 178/79)  BP(mean): --  RR: 18 (10 Feb 2024 08:31) (16 - 18)  SpO2: 96% (10 Feb 2024 08:31) (96% - 99%)    Parameters below as of 10 Feb 2024 08:31  Patient On (Oxygen Delivery Method): room air      I&O's Detail    09 Feb 2024 07:01  -  10 Feb 2024 07:00  --------------------------------------------------------  IN:    IV PiggyBack: 100 mL    Oral Fluid: 720 mL  Total IN: 820 mL    OUT:    Colostomy (mL): 400 mL    Indwelling Catheter - Urethral (mL): 2100 mL    Stool (mL): 100 mL  Total OUT: 2600 mL    Total NET: -1780 mL        REVIEW OF SYSTEMS:    CONSTITUTIONAL: No weakness, fevers or chills  EYES/ENT: No visual changes;  No vertigo or throat pain   NECK: No pain or stiffness  RESPIRATORY: No cough, wheezing, hemoptysis; No shortness of breath  CARDIOVASCULAR: No chest pain or palpitations  GASTROINTESTINAL: No abdominal or epigastric pain. No nausea, vomiting, or hematemesis; No diarrhea or constipation. No melena or hematochezia.  GENITOURINARY: No dysuria, frequency or hematuria  NEUROLOGICAL: No numbness or weakness  SKIN: No itching, burning, rashes, or lesions   All other review of systems is negative unless indicated above.  PHYSICAL EXAM:    General: in no acute distress  Eyes: PERRLA, EOMI; conjunctiva and sclera clear  Head: Normocephalic; atraumatic  ENMT: No nasal discharge; airway clear  Neck: Supple; non tender; no masses  Respiratory: No wheezes, rales or rhonchi  Cardiovascular: Regular rate and rhythm. S1 and S2 Normal; No murmurs, gallops or rubs  Gastrointestinal: Soft non-tender non-distended; Normal bowel sounds  Genitourinary: No  suprapubic  tenderness  Extremities: Normal range of motion, No clubbing, cyanosis or edema  Vascular: Peripheral pulses palpable 2+ bilaterally  Neurological: Alert and oriented x4  Skin: Warm and dry. No acute rash  Lymph Nodes: No acute cervical adenopathy  Musculoskeletal: Normal muscle tone, without deformities  Psychiatric: Cooperative and appropriate    LABS:                           9.2    14.94 )-----------( 240      ( 10 Feb 2024 07:55 )             28.3     10 Feb 2024 07:55    136    |  107    |  57     ----------------------------<  126    3.6     |  20     |  3.93     Ca    8.0        10 Feb 2024 07:55          Urinalysis Basic - ( 10 Feb 2024 07:55 )  Color: x / Appearance: x / SG: x / pH: x  Gluc: 126 mg/dL / Ketone: x  / Bili: x / Urobili: x   Blood: x / Protein: x / Nitrite: x   Leuk Esterase: x / RBC: x / WBC x   Sq Epi: x / Non Sq Epi: x / Bacteria: x        MEDICATIONS  (STANDING):  amLODIPine   Tablet 10 milliGRAM(s) Oral daily  aspirin enteric coated 81 milliGRAM(s) Oral daily  atorvastatin 40 milliGRAM(s) Oral at bedtime  dextrose 5%. 1000 milliLiter(s) (50 mL/Hr) IV Continuous <Continuous>  dextrose 5%. 1000 milliLiter(s) (100 mL/Hr) IV Continuous <Continuous>  dextrose 50% Injectable 12.5 Gram(s) IV Push once  dextrose 50% Injectable 25 Gram(s) IV Push once  dextrose 50% Injectable 25 Gram(s) IV Push once  doxycycline IVPB 100 milliGRAM(s) IV Intermittent every 12 hours  glucagon  Injectable 1 milliGRAM(s) IntraMuscular once  heparin   Injectable 5000 Unit(s) SubCutaneous every 8 hours  meropenem Injectable 500 milliGRAM(s) IV Push every 12 hours  sevelamer carbonate 800 milliGRAM(s) Oral three times a day with meals  sodium bicarbonate 650 milliGRAM(s) Oral two times a day    MEDICATIONS  (PRN):  acetaminophen     Tablet .. 650 milliGRAM(s) Oral every 6 hours PRN Mild Pain (1 - 3)  benzonatate 100 milliGRAM(s) Oral every 8 hours PRN Cough  dextrose Oral Gel 15 Gram(s) Oral once PRN Blood Glucose LESS THAN 70 milliGRAM(s)/deciliter  hydrALAZINE Injectable 10 milliGRAM(s) IV Push every 6 hours PRN for systolic BP greater than 160  melatonin 3 milliGRAM(s) Oral at bedtime PRN Sleep  oxyCODONE    IR 5 milliGRAM(s) Oral every 6 hours PRN Moderate Pain (4 - 6)  oxyCODONE    IR 10 milliGRAM(s) Oral every 6 hours PRN Severe Pain (7 - 10)      RADIOLOGY & ADDITIONAL TESTS:   CC: L facial swelling.     HPI: 72 y/o M w/ PMH of DM2, HTN, polio with paraplegia,  CKD IV, BPH, pulmonary HTN, p/w L facial swelling. Swelling started 3 days PTA  and has gotten progressively worse. C/o pain as well. Patient also has discomfort in his nose. Patient took a dose of amoxicillin outpatient. No  fever, chills, cough, runny nose, sore throat. Patient stated  that he is suppose to have a suprapubic catheter placed soon.         INTERVAL HPI/ OVERNIGHT EVENTS: chart reviewed, Pt was seen and examined, c/o R heel pain, reports that has "cellulitis for a long time" states  was Tx for that in the past and since recently had sharp pains in R heel worse at night and better during the day, also improved with massage. pt denies H/o neuropathy. pt states that didnt sleep past few  nights. Also reports that feels better since Dilaudid changed to Oxy. Family at bedside, explained that as per exam and pts description unlikely cellulitis. Also Pt is on broad spectrum Abxs. results of XR and Doppler discussed      Vital Signs Last 24 Hrs  T(C): 36.7 (10 Feb 2024 08:31), Max: 37.7 (09 Feb 2024 21:31)  T(F): 98.1 (10 Feb 2024 08:31), Max: 99.9 (09 Feb 2024 21:31)  HR: 89 (10 Feb 2024 08:31) (86 - 94)  BP: 161/85 (10 Feb 2024 08:31) (145/52 - 178/79)  RR: 18 (10 Feb 2024 08:31) (16 - 18)  SpO2: 96% (10 Feb 2024 08:31) (96% - 99%)    Parameters below as of 10 Feb 2024 08:31  Patient On (Oxygen Delivery Method): room air        REVIEW OF SYSTEMS:  All other review of systems is negative unless indicated above.      PHYSICAL EXAM:  General: in no acute distress  Eyes: EOMI; conjunctiva and sclera clear, min L upper/lower lid   edema, no erythema or warmth   Head: Normocephalic; atraumatic  ENMT: No nasal discharge; airway clear  Neck: Supple; non tender; no masses  Respiratory: No wheezes, rales or rhonchi  Cardiovascular: Regular rate and rhythm. S1 and S2 Normal;   Gastrointestinal: Soft non-tender non-distended; Normal bowel sounds.  Ostomy in place   Genitourinary: No  suprapubic  tenderness, Murillo in place   Extremities: Min  feet edema, no erythema or warmth,  tenderness to palpation over R heel   and R ankle.   Neurological: Alert and oriented x3, speech is clear, + paraplegia   Lymph Nodes: + b/l submandibular adenopathy   Musculoskeletal: Decreased  muscle tone LE b/l,  LLE everted   Psychiatric: Cooperative    LABS:                           9.2    14.94 )-----------( 240      ( 10 Feb 2024 07:55 )             28.3     10 Feb 2024 07:55    136    |  107    |  57     ----------------------------<  126    3.6     |  20     |  3.93     Ca    8.0        10 Feb 2024 07:55      Urinalysis Basic - ( 10 Feb 2024 07:55 )  Color: x / Appearance: x / SG: x / pH: x  Gluc: 126 mg/dL / Ketone: x  / Bili: x / Urobili: x   Blood: x / Protein: x / Nitrite: x   Leuk Esterase: x / RBC: x / WBC x   Sq Epi: x / Non Sq Epi: x / Bacteria: x    Culture - Urine (02.05.24 @ 17:06)   - Levofloxacin: R 4  - Ceftazidime: S 8  - Levofloxacin: I 2  - Ceftazidime: S 8  - Ciprofloxacin: S 0.5  - Ciprofloxacin: R 2  - Aztreonam: R >16  - Aztreonam: R >16  - Cefepime: I 16  - Cefepime: I 16  - Imipenem: S 2  - Imipenem: S <=1  - Meropenem: S 2  - Piperacillin/Tazobactam: I 32  - Piperacillin/Tazobactam: I 32  - Meropenem: S <=1  Specimen Source: OR Collect None  Culture Results:   >100,000 CFU/ml Pseudomonas aeruginosa   >100,000 CFU/ml Pseudomonas aeruginosa #2   Multiple Morphological Strains  Organism Identification: Pseudomonas aeruginosa   Pseudomonas aeruginosa  Organism: Pseudomonas aeruginosa  Organism: Pseudomonas aeruginosa      MEDICATIONS  (STANDING):  amLODIPine   Tablet 10 milliGRAM(s) Oral daily  aspirin enteric coated 81 milliGRAM(s) Oral daily  atorvastatin 40 milliGRAM(s) Oral at bedtime  dextrose 5%. 1000 milliLiter(s) (50 mL/Hr) IV Continuous <Continuous>  dextrose 5%. 1000 milliLiter(s) (100 mL/Hr) IV Continuous <Continuous>  dextrose 50% Injectable 12.5 Gram(s) IV Push once  dextrose 50% Injectable 25 Gram(s) IV Push once  dextrose 50% Injectable 25 Gram(s) IV Push once  doxycycline IVPB 100 milliGRAM(s) IV Intermittent every 12 hours  glucagon  Injectable 1 milliGRAM(s) IntraMuscular once  heparin   Injectable 5000 Unit(s) SubCutaneous every 8 hours  meropenem Injectable 500 milliGRAM(s) IV Push every 12 hours  sevelamer carbonate 800 milliGRAM(s) Oral three times a day with meals  sodium bicarbonate 650 milliGRAM(s) Oral two times a day    MEDICATIONS  (PRN):  acetaminophen     Tablet .. 650 milliGRAM(s) Oral every 6 hours PRN Mild Pain (1 - 3)  benzonatate 100 milliGRAM(s) Oral every 8 hours PRN Cough  dextrose Oral Gel 15 Gram(s) Oral once PRN Blood Glucose LESS THAN 70 milliGRAM(s)/deciliter  hydrALAZINE Injectable 10 milliGRAM(s) IV Push every 6 hours PRN for systolic BP greater than 160  melatonin 3 milliGRAM(s) Oral at bedtime PRN Sleep  oxyCODONE    IR 5 milliGRAM(s) Oral every 6 hours PRN Moderate Pain (4 - 6)  oxyCODONE    IR 10 milliGRAM(s) Oral every 6 hours PRN Severe Pain (7 - 10)      RADIOLOGY & ADDITIONAL TESTS:      ACC: [id number] EXAM:  US DPLX LWR EXT VEINS LTD RT   ORDERED BY: AVA BOOGIE     PROCEDURE DATE:  02/09/2024          INTERPRETATION:  CLINICAL INFORMATION: Right leg pain. Evaluate for DVT.    COMPARISON: None available.    TECHNIQUE: Duplex sonography of the RIGHT LOWER extremity veins with   color and spectral Doppler, with and without compression.    FINDINGS:    There is normal compressibility of the right common femoral, femoral and   popliteal veins.  The contralateral common femoral vein is patent.  Doppler examination shows normal spontaneous and phasic flow.    No calf vein thrombosis is detected. Please note that the gastrocnemius   and soleal veins are not visualized, precluding assessment.    IMPRESSION:  No evidence of right lower extremity deep venous thrombosis. See   described limitations above.      ACC: [id number] EXAM:  US DPLX CAROTIDS COMPL BI   ORDERED BY: ALFREDO CARLTON     PROCEDURE DATE:  02/06/2024          INTERPRETATION:  CLINICAL INFORMATION: Confirm a high grade carotid   artery stenosis identified on a maxillofacial CT examination, dated   2/4/2024    COMPARISON: None available.    TECHNIQUE: Grayscale, color and spectral Doppler examination of both   carotid arteries was performed.    FINDINGS:    Prominent calcified atheromatous plaques impeding antegrade flow are   present bilaterally in the right and left internal carotid arteries in   the regions of the bifurcations.    Peak systolic velocities are as follows:    RIGHT:  PROX CCA = 94.82  DIST CCA = 105.01  PROX ICA = 263/30  DIST ICA = 136.92  ECA = 318    LEFT:  PROX CCA = 113.88  DIST CCA = 123.20  PROX ICA = 607/160  DIST ICA = 151.21  ECA = 164.87    There is retrograde flow in the right vertebral artery.    There is antegrade flow in the left vertebral artery.    IMPRESSION:    There are high grade, greater than 70% stenoses of both the right and   left internal carotid arteries.    There are flow-limiting stenoses of the right and left external carotid   arteries.    There is retrograde flow in the right vertebral artery.    A dedicated CTA or MRA of the great vessels arising from the arch is   recommended to confirm the high grade stenoses of the right and left   internal carotid arteries and to investigate the retrograde flow in the   right vertebral artery with possible steal phenomenon.    ACC: [id number] EXAM:  US KIDNEYS AND BLADDER   ORDERED BY: ALLI HAYES     PROCEDURE DATE:  02/04/2024          INTERPRETATION:  CLINICAL INFORMATION: Evaluate for hydro-    COMPARISON: 7/30/2018    TECHNIQUE: Sonography of the kidneys and bladder.    FINDINGS:  Right kidney: 13.1 cm. upper pole simple appearing cyst measuring 3.6 x   3.2 x 4.4 cm and severe hydronephrosis    Left kidney: 12.4 cm. severe hydronephrosis    Urinary bladder: Only the right ureteral jets were seen. Prevoid volume   is 1871 cc    IMPRESSION:  Right kidney exophytic cyst appears simple. Bilateral severe   hydronephrosis. Only a right ureteral jet was seen. Large prevoid volume        ACC: [id number] EXAM:  CT MAXILLOFACIAL IC   ORDERED BY: ILSA MIRANDA     PROCEDURE DATE:  02/04/2024          INTERPRETATION:  History: Swelling of the left face, question facial   abscess near the nose.  73-year-old with past medical history for   diabetes, hypertension, polio presents emergency department with left   facial swelling. Patient started with symptoms approximately 3 days ago.   Patient noticed the painful area left nose. Now patient with more   swelling left side of face. Patient did take some amoxicillin at home   which helped but did not take last night and now swelling is worse today.    Description: A postcontrast CT scan of the maxillofacial region was   performed.    No prior maxillofacial imaging study was available for comparison at this   Medical Center.    Axial images with coronal and sagittal reformatted series were obtained.    90 cc intravenous Omnipaque 350 contrast was administered.    Extensive nasal and perinasal soft tissue swelling is noted. Soft tissue   swelling also involves the left preseptal periorbital soft tissues,   without evidence for post septal extension at this time. This most likely   reflects a soft tissue infectious phlegmon-cellulitis, without focal   abscess collection at this time.    Both maxillary sinuses are severely opacified. The secretions demonstrate   increased density on the right, which may reflect chronicity-inspissation   versus chronic fungal sinus disease. The walls of the maxillary sinuses   are mildly thickened suggesting a chronic sinusitis component. Mild   mucosal thickening involves the ethmoid sinuses. The sphenoid sinus is   overall well aerated.    Bilateral nasal bone deformities are noted which may reflect old   fractures (there is no submitted clinicalhistory of recent facial   trauma).    A high-grade stenosis involves the origin of the left internal carotid   artery, likely measuring greater than 80% via NASCET criteria. Calcified   plaque and moderate stenosis involves the origin of the right internal   carotid artery. Correlation with carotid ultrasound is recommended.    Impacted horizontally oriented wisdom teeth involve the bilateral   maxilla-floor of the maxillary sinuses.    IMPRESSION:    Extensive nasal and perinasal soft tissue swelling is noted. Soft tissue   swelling also involves the left preseptal periorbital soft tissues,   without evidence for post septal extension at this time. This most likely   reflects a soft tissue infectious phlegmon-cellulitis, without focal   abscess collection at this time.    Both maxillary sinuses are severely opacified. The secretions demonstrate   increased density on the right, which may reflect chronicity-inspissation   versus chronic fungal sinus disease. The walls of the maxillary sinuses   are mildly thickened suggesting a chronic sinusitis component.      ACC: [id number] EXAM:  CT ABDOMEN AND PELVIS   ORDERED BY: ILSA MIRANDA     PROCEDURE DATE:  02/04/2024          INTERPRETATION:  CLINICAL INFORMATION: Elevated creatinine, urinary   retention    COMPARISON: 7/26/2018    CONTRAST/COMPLICATIONS:  IV Contrast: NONE  Oral Contrast: NONE  Complications: None reported at time of study completion    PROCEDURE:  CT of the Abdomen and Pelvis was performed.  Sagittal and coronal reformats were performed.    FINDINGS:  LOWER CHEST: Within normal limits.    LIVER: Within normal limits.  BILE DUCTS: Normal caliber.  GALLBLADDER: Cholelithiasis.  SPLEEN: Within normal limits.  PANCREAS: Within normal limits.  ADRENALS: Within normal limits.  KIDNEYS/URETERS: Marked bilateral hydronephrosis, as noted previously, to   the level of the bladder, increased in extent since the previous study   with increased distention and dilatation of the ureters. Increased size   of slightly hyperattenuating lesion from the upper pole of the right   kidney, measuring 3.8 cm, compared to 1.6 cm previously. Smaller,   bilateral simple cysts.    BLADDER: Distended with trabeculations  REPRODUCTIVE ORGANS: Enlarged prostate gland with seeds in place    BOWEL: No bowel obstruction.  PERITONEUM: No ascites.  VESSELS: Within normal limits.  RETROPERITONEUM/LYMPH NODES: No lymphadenopathy.  ABDOMINAL WALL: Within normal limits.  BONES: Degenerative changes. Bilateral marked muscular atrophy in the hip   joints.    IMPRESSION:  1.  Marked bilateral hydronephrosis to the level of the bladder,   increased in severity and extent since the previous study.  2.  Increased size of a hyperattenuating exophytic right renal lesion,   possible hyper proteinaceous cyst. Would recommend elective repeat   ultrasound.

## 2024-02-10 NOTE — PROGRESS NOTE ADULT - ASSESSMENT
74 y/o M w/ PMH of DM2, HTN, polio, CKD IV, BPH, pulmonary HTN, p/w L facial swelling. Swelling started 3 days ago, and has gotten progressively worse. C/o pain as well. Patient also has discomfort in his nose. Patient took a dose of amoxicillin outpatient. Denies fever, chills, cough, runny nose, sore throat. Also c/p pain in L side of nose. Patient states that he is suppose to have a suprapubic catheter placed soon. Patient states he has urinated since coming to ED. Started on IV vancomycin/zosyn.    1. L periorbital/facial cellulitis. Chronic sinusitis. Leukocytosis. Complicated UTI - PSAE. BPH. Urinary retention-Bilateral hydronephrosis. SONDRA on CKD IV  - imaging reviewed  - s/p zosyn [id number] #3, on meropenem [id number] #3   - on doxycycline #5  - continue with abx coverage  - urology eval noted, s/p attempted spc c/b rectal injury s/p diverting ostomy  - urine cx growing PSAE  - complete 5-7 day abx course   - monitor temps  - tolerating abx well so far; no side effects noted  - reason for abx use and side effects reviewed with patient  - f/u cultures - blood cx no growth   - fu cbc    Clinical team may change from intravenous to oral antibiotics when the following criteria are met:   1. Patient is clinically improving/stable       a)	Improved signs and symptoms of infection from initial presentation       b)	Afebrile for 24 hours       c)	Leukocytosis trending towards normal range   2. Patient is tolerating oral intake   3. Initial blood cultures are negative     When above criteria met - may change iv antibiotics to: po doxy/augmentin 100mg BID/[id number] x 7 day course, pt to complete 5-7 days of merrem for UTI then this po regimen

## 2024-02-10 NOTE — PROGRESS NOTE ADULT - ASSESSMENT
73 M with Hx of DM2, HTN, polio with chronic LE paralysis, CKD IV with baseline Cr around 4, BPH, pulmonary HTN was admitted with L facial swelling and left periorbital cellulitis.  He underwent attempt for suprapubic catheter placement for hydronephrosis and SONDRA/CKD.  Hospital course complicated by intraoperative rectal injury, now status post diverting ostomy.    L Periorbital cellulitis  -  clinically on ABXs - Imipenem (day #2) and Doxy (day #4)  -  afebrile, WBC coming down to 13k  -  blood cultures from 2/4 are negative  -  CT reports extensive nasal and perinasal soft tissue swelling. +soft tissue swelling involves L pre-septal periorbital soft tissues without evidence for post septal extension.   -  called [facility] for Optho consult (2/5) Patient does not need transfer for evaluation, attempted to contact [facility] for ENT - unsuccessful  -  CT also reports that both maxillary sinuses are severely opacified w/ possible inspissation vs chronic fungal disease + chronic sinusitis  -  concerns for developing right heel cellulitis, although I suspect the heel erythema is due to pressure, will obtain xrays of the right heel to start    High grade Bilateral internal carotid artery stenoses  -  s/p neuro and vascular consults  -  patient has refused MRI/MRA brain at this time  -  continue ASA and statin  -  vascular followup with Dr. Leila Orona upon discharge     SONDRA due to B/L hydronephrosis + Exophytic Renal lesion + Suspected SONDRA on CKD stage IV  -  CT reports marked B/L hydronephrosis increased in severity and extent, increased size of a hyperattenuating exophytic right renal lesion 3.8cm  -  s/p  and renal consults  -  s/p attempt for suprapubic catheter placement, complicated by bowel injury requiring procedure to be aborted and now s/p colostomy  -  maintain Murillo in place for now  -  monitor urine output and renal function  -  creatinine appears to be around his baseline  -  avoid NSAIDs and nephrotoxic meds  -  will eventually need a fistula as he is high risk for progression to ESRD  -  spoke to IR, they will evaluate patient, they will plan to do IR SPC placement prior to discharge home    Complicated Pseudomonas UTI (resistant)  -  maintain isolation   -  ABXs changed to Imipenem on 2/8 (day #2)  -  spoke with Dr. Torrez - anticipate at least 5-7 days of IV Meropenem and then change to PO ABXs    S/P Acute Rectal Injury, s/p diverting colostomy  -  wound care per colorectal  -  tolerating diet  -  pain control  -  colostomy care and teaching done by Rakan Padilla    Hypertension  -  BP stable, continue Norvasc, increase dose to 10mg daily  -  low salt diet     Cholelithaisis  -  asymptomatic, f/u outpatient     Type 2 Diabetes Mellitus  -  sugars stable, continue Admelog SSI and ADA diet    Metabolic Acidosis  - due to advanced CKD, stable  -  continue sodium bicarbonate tabs    Anemia of Chronic Disease  -  due to advanced CKD, stable, monitor for now    Hyperphosphatemia  -  due to advanced CKD, continue Sevalemer    Hx of Polio  -  has chronic paraplegia  -  frequent repositioning and out of bed to chair to minimize pressure ulcer injuries    Hypokalemia  -  resolved    DVT prophylaxis  -  venodynes, ordered SQ Heparin, but patient is reluctant to take it, he has been counseled on the risk of developing a DVT/PE while he is bedbound    Dispo:  OOB to chair, frequent repositioning, requires to stay inpatient to complete IV Meropenem course and will need IR guided SPC placement       73 M with Hx of DM2, HTN, polio with chronic LE paralysis, CKD IV with baseline Cr around 4, BPH, pulmonary HTN was admitted with L facial swelling and left periorbital cellulitis.  He underwent attempt for suprapubic catheter placement for hydronephrosis and SONDRA/CKD.  Hospital course complicated by intraoperative rectal injury, now status post diverting ostomy.    1. L Periorbital cellulitis, clinically improving   -  clinically on ABXs, S/p Zosyn, now on  Meropenem (day #3) and Doxy (day #5)  -  afebrile, WBC coming down to 13k  -  blood cultures from 2/4 are negative  -  CT reports extensive nasal and perinasal soft tissue swelling. +soft tissue swelling involves L pre-septal periorbital soft tissues without evidence for post septal extension.   -  called [facility] for Optho consult (2/5) Patient does not need transfer for evaluation  -  CT also reports that both maxillary sinuses are severely opacified w/ possible inspissation vs chronic fungal disease + chronic sinusitis  - Pt will need to f/u with ENT outPt     2. High grade Bilateral internal carotid artery stenoses  -  s/p neuro and vascular consults  -  patient has refused MRI/MRA brain at this time  -  continue ASA and statin  -  vascular followup with Dr. Leila Orona upon discharge     3.  SONDRA due to B/L hydronephrosis + Exophytic Renal lesion + Suspected SONDRA on CKD stage IV. Hyperphosphatemia   -  CT reports marked B/L hydronephrosis increased in severity and extent, increased size of a hyperattenuating exophytic right renal lesion 3.8cm  -  s/p  and renal consults  -  s/p attempt for suprapubic catheter placement, complicated by bowel injury requiring procedure to be aborted and now s/p colostomy  -  maintain Murillo in place for now  -  monitor urine output and renal function  -  creatinine improved and stable  -  avoid NSAIDs and nephrotoxic meds  -  continue Sevalemer  -  will eventually need a fistula as he is high risk for progression to ESRD  -  spoke to IR, they will evaluate patient, they will plan to do IR SPC placement prior to discharge home ( On IV Abxs  now)     4. Complicated MDRO  Pseudomonas UTI due to obstructive   uropathy   -  maintain isolation   -  ABXs changed to Meropenem  on 2/8 (day #3)  -  Plan for  5-7 days of IV Meropenem  as per ID recs     5. S/P Acute Rectal Injury, s/p diverting colostomy  -  wound care per colorectal  -  tolerating diet  -  pain control  -  colostomy care and teaching done by Rakan Padilla RN     6. Hypertension  -  BP  improved  -C/w  Norvasc 10mg daily  - Hydralazine PRN     7. R heel pain   suspect due to pressure injury vs neuropathy   No clinical signs of cellulitis  Off load heels, turn and position Q 3H  bed with air matrass changed this am   Will order boot   Start trial of gabapentin   Dilaudid changed to PO Oxy PRN  Tylenol PRN      8. Cholelithiasis  -  asymptomatic, f/u outpatient     9. H/o Type 2 Diabetes Mellitus  HB A1c 4.9   regular diet     10.  Metabolic Acidosis  - due to advanced CKD, stable  -  continue sodium bicarbonate tabs    11. Anemia of Chronic Disease  -  due to advanced CKD, stable, monitor for now    12. Hx of Polio  -  has chronic paraplegia  -  frequent repositioning and out of bed to chair to minimize pressure ulcer injuries        DVT prophylaxis  -  venodynes, ordered SQ Heparin, but patient is reluctant to take it, he has been counseled on the risk of developing a DVT/PE while he is bedbound    Dispo:  OOB to chair, frequent repositioning, requires to stay inpatient to complete IV Meropenem course and will need IR guided SPC placement

## 2024-02-10 NOTE — PROGRESS NOTE ADULT - SUBJECTIVE AND OBJECTIVE BOX
PT resting in bed, tolerating Mcconnell cathter,  He denies any CP, dyspnea,  abd pain, N/V.    PE:  Lung    : No resp distress    Abd:  : Incisions C/D/I,  Non tender, No distension, + colostomy in place with +  stool in bag     Genitalia :  mcconnell draining  cloudy urine with white sediment draining well    Neuro   : A&Ox3      A/P:	  72 yo Male s/p attempted SPT placement on 2/5/24.   Pt does not want long term indwelling Mcconnell  IR Plan of SP Catheter placement prior dc  Continue IV Abx/Meropenem as per ID  Case discussed with Dr. Xiao

## 2024-02-10 NOTE — PROGRESS NOTE ADULT - SUBJECTIVE AND OBJECTIVE BOX
Date of service: 02-10-24 @ 13:05    pt seen and examined  pod #5  has RLE heel/ankle pain  face feels less tender  feels better    ROS: no fever or chills; denies dizziness, no HA, no SOB or cough, no legs pain, no rashes    MEDICATIONS  (STANDING):  amLODIPine   Tablet 10 milliGRAM(s) Oral daily  aspirin enteric coated 81 milliGRAM(s) Oral daily  atorvastatin 40 milliGRAM(s) Oral at bedtime  dextrose 5%. 1000 milliLiter(s) (50 mL/Hr) IV Continuous <Continuous>  dextrose 5%. 1000 milliLiter(s) (100 mL/Hr) IV Continuous <Continuous>  dextrose 50% Injectable 12.5 Gram(s) IV Push once  dextrose 50% Injectable 25 Gram(s) IV Push once  dextrose 50% Injectable 25 Gram(s) IV Push once  doxycycline IVPB 100 milliGRAM(s) IV Intermittent every 12 hours  glucagon  Injectable 1 milliGRAM(s) IntraMuscular once  heparin   Injectable 5000 Unit(s) SubCutaneous every 8 hours  meropenem Injectable 500 milliGRAM(s) IV Push every 12 hours  sevelamer carbonate 800 milliGRAM(s) Oral three times a day with meals  sodium bicarbonate 650 milliGRAM(s) Oral two times a day    Vital Signs Last 24 Hrs  T(C): 36.7 (10 Feb 2024 08:31), Max: 37.7 (09 Feb 2024 21:31)  T(F): 98.1 (10 Feb 2024 08:31), Max: 99.9 (09 Feb 2024 21:31)  HR: 89 (10 Feb 2024 08:31) (86 - 94)  BP: 161/85 (10 Feb 2024 08:31) (145/52 - 178/79)  BP(mean): --  RR: 18 (10 Feb 2024 08:31) (16 - 18)  SpO2: 96% (10 Feb 2024 08:31) (96% - 99%)    Parameters below as of 10 Feb 2024 08:31  Patient On (Oxygen Delivery Method): room air      PE:  Constitutional: NAD L periorbital/L facial edema tenderness   HEENT: NC/AT, EOMI, PERRLA, conjunctivae clear; ears and nose atraumatic; pharynx benign  Neck: supple; thyroid not palpable  Back: no tenderness  Respiratory: respiratory effort normal; clear to auscultation  Cardiovascular: S1S2 regular, no murmurs  Abdomen: soft, not tender, not distended, positive BS; +ostomy  Genitourinary: no suprapubic tenderness  Lymphatic: no LN palpable  Musculoskeletal: no muscle tenderness, no joint swelling or tenderness  Extremities: no pedal edema  Neurological/ Psychiatric: AxOx3, Judgement and insight normal;  moving all extremities  Skin: no rashes; no palpable lesions    Labs: all available labs reviewed                                    9.2    14.94 )-----------( 240      ( 10 Feb 2024 07:55 )             28.3     02-10    136  |  107  |  57<H>  ----------------------------<  126<H>  3.6   |  20<L>  |  3.93<H>    Ca    8.0<L>      10 Feb 2024 07:55           Culture - Blood (02.04.24 @ 13:47)   Specimen Source: .Blood None  Culture Results:   No growth at 48 Hours  Culture - Blood (02.04.24 @ 13:47)   Specimen Source: .Blood None  Culture Results:   No growth at 48 Hours    Culture - Urine (02.05.24 @ 17:06)   - Ceftazidime: S 8  - Meropenem: S <=1  - Piperacillin/Tazobactam: I 32  - Aztreonam: R >16  - Cefepime: I 16  - Ciprofloxacin: R 2  - Imipenem: S 2  - Levofloxacin: R 4  Specimen Source: OR Collect None  Culture Results:   >100,000 CFU/ml Pseudomonas aeruginosa  Organism Identification: Pseudomonas aeruginosa  Organism: Pseudomonas aeruginosa  Method Type: PRIMITIVO    Radiology: all available radiological tests reviewed  < from: CT Maxillofacial w/ IV Cont (02.04.24 @ 15:45) >    ACC: [id number] EXAM:  CT MAXILLOFACIAL IC   ORDERED BY: ILSA MIRANDA     PROCEDURE DATE:  02/04/2024          INTERPRETATION:  History: Swelling of the left face, question facial   abscess near the nose.  73-year-old with past medical history for   diabetes, hypertension, polio presents emergency department with left   facial swelling. Patient started with symptoms approximately 3 days ago.   Patient noticed the painful area left nose. Now patient with more   swelling left side of face. Patient did take some amoxicillin at home   which helped but did not take last night and now swelling is worse today.    Description: A postcontrast CT scan of the maxillofacial region was   performed.    No prior maxillofacial imaging study was available for comparison at this   Medical Center.    Axial images with coronal and sagittal reformatted series were obtained.    90 cc intravenous Omnipaque 350 contrast was administered.    Extensive nasal and perinasal soft tissue swelling is noted. Soft tissue   swelling also involves the left preseptal periorbital soft tissues,   without evidence for post septal extension at this time. This most likely   reflects a soft tissue infectious phlegmon-cellulitis, without focal   abscess collection at this time.    Both maxillary sinuses are severely opacified. The secretions demonstrate   increased density on the right, which may reflect chronicity-inspissation   versus chronic fungal sinus disease. The walls of the maxillary sinuses   are mildly thickened suggesting a chronic sinusitis component. Mild   mucosal thickening involves the ethmoid sinuses. The sphenoid sinus is   overall well aerated.    Bilateral nasal bone deformities are noted which may reflect old   fractures (there is no submitted clinicalhistory of recent facial   trauma).    A high-grade stenosis involves the origin of the left internal carotid   artery, likely measuring greater than 80% via NASCET criteria. Calcified   plaque and moderate stenosis involves the origin of the right internal   carotid artery. Correlation with carotid ultrasound is recommended.    Impacted horizontally oriented wisdom teeth involve the bilateral   maxilla-floor of the maxillary sinuses.    IMPRESSION:    Extensive nasal and perinasal soft tissue swelling is noted. Soft tissue   swelling also involves the left preseptal periorbital soft tissues,   without evidence for post septal extension at this time. This most likely   reflects a soft tissue infectious phlegmon-cellulitis, without focal   abscess collection at this time.    Both maxillary sinuses are severely opacified. The secretions demonstrate   increased density on the right, which may reflect chronicity-inspissation   versus chronic fungal sinus disease. The walls of the maxillary sinuses   are mildly thickened suggesting a chronic sinusitis component.    A high-grade stenosis involves the origin of the left internal carotid   artery, likely measuring greater than 80% via NASCET criteria. Calcified   plaque and moderate stenosis involves the origin of the right internal   carotid artery. Correlation with carotid ultrasound is recommended.      Advanced directives addressed: full resuscitation

## 2024-02-11 LAB
ANION GAP SERPL CALC-SCNC: 6 MMOL/L — SIGNIFICANT CHANGE UP (ref 5–17)
BUN SERPL-MCNC: 52 MG/DL — HIGH (ref 7–23)
CALCIUM SERPL-MCNC: 7.8 MG/DL — LOW (ref 8.5–10.1)
CHLORIDE SERPL-SCNC: 106 MMOL/L — SIGNIFICANT CHANGE UP (ref 96–108)
CO2 SERPL-SCNC: 25 MMOL/L — SIGNIFICANT CHANGE UP (ref 22–31)
CREAT SERPL-MCNC: 3.59 MG/DL — HIGH (ref 0.5–1.3)
EGFR: 17 ML/MIN/1.73M2 — LOW
FOLATE SERPL-MCNC: 5.2 NG/ML — SIGNIFICANT CHANGE UP
GLUCOSE SERPL-MCNC: 108 MG/DL — HIGH (ref 70–99)
HCT VFR BLD CALC: 29.2 % — LOW (ref 39–50)
HGB BLD-MCNC: 9.4 G/DL — LOW (ref 13–17)
MCHC RBC-ENTMCNC: 29.1 PG — SIGNIFICANT CHANGE UP (ref 27–34)
MCHC RBC-ENTMCNC: 32.2 GM/DL — SIGNIFICANT CHANGE UP (ref 32–36)
MCV RBC AUTO: 90.4 FL — SIGNIFICANT CHANGE UP (ref 80–100)
PLATELET # BLD AUTO: 286 K/UL — SIGNIFICANT CHANGE UP (ref 150–400)
POTASSIUM SERPL-MCNC: 3.7 MMOL/L — SIGNIFICANT CHANGE UP (ref 3.5–5.3)
POTASSIUM SERPL-SCNC: 3.7 MMOL/L — SIGNIFICANT CHANGE UP (ref 3.5–5.3)
RBC # BLD: 3.23 M/UL — LOW (ref 4.2–5.8)
RBC # FLD: 13.3 % — SIGNIFICANT CHANGE UP (ref 10.3–14.5)
SODIUM SERPL-SCNC: 137 MMOL/L — SIGNIFICANT CHANGE UP (ref 135–145)
VIT B12 SERPL-MCNC: 379 PG/ML — SIGNIFICANT CHANGE UP (ref 232–1245)
WBC # BLD: 13.39 K/UL — HIGH (ref 3.8–10.5)
WBC # FLD AUTO: 13.39 K/UL — HIGH (ref 3.8–10.5)

## 2024-02-11 PROCEDURE — 99232 SBSQ HOSP IP/OBS MODERATE 35: CPT

## 2024-02-11 RX ORDER — PREGABALIN 225 MG/1
1000 CAPSULE ORAL DAILY
Refills: 0 | Status: DISCONTINUED | OUTPATIENT
Start: 2024-02-11 | End: 2024-02-22

## 2024-02-11 RX ORDER — FOLIC ACID 0.8 MG
1 TABLET ORAL DAILY
Refills: 0 | Status: DISCONTINUED | OUTPATIENT
Start: 2024-02-11 | End: 2024-02-22

## 2024-02-11 RX ORDER — CYCLOBENZAPRINE HYDROCHLORIDE 10 MG/1
10 TABLET, FILM COATED ORAL AT BEDTIME
Refills: 0 | Status: DISCONTINUED | OUTPATIENT
Start: 2024-02-11 | End: 2024-02-22

## 2024-02-11 RX ADMIN — ATORVASTATIN CALCIUM 40 MILLIGRAM(S): 80 TABLET, FILM COATED ORAL at 21:15

## 2024-02-11 RX ADMIN — AMLODIPINE BESYLATE 10 MILLIGRAM(S): 2.5 TABLET ORAL at 09:47

## 2024-02-11 RX ADMIN — Medication 1 MILLIGRAM(S): at 17:38

## 2024-02-11 RX ADMIN — Medication 650 MILLIGRAM(S): at 21:15

## 2024-02-11 RX ADMIN — SEVELAMER CARBONATE 800 MILLIGRAM(S): 2400 POWDER, FOR SUSPENSION ORAL at 08:26

## 2024-02-11 RX ADMIN — Medication 81 MILLIGRAM(S): at 09:48

## 2024-02-11 RX ADMIN — PREGABALIN 1000 MICROGRAM(S): 225 CAPSULE ORAL at 17:38

## 2024-02-11 RX ADMIN — CYCLOBENZAPRINE HYDROCHLORIDE 10 MILLIGRAM(S): 10 TABLET, FILM COATED ORAL at 21:15

## 2024-02-11 RX ADMIN — SEVELAMER CARBONATE 800 MILLIGRAM(S): 2400 POWDER, FOR SUSPENSION ORAL at 12:37

## 2024-02-11 RX ADMIN — MEROPENEM 500 MILLIGRAM(S): 1 INJECTION INTRAVENOUS at 09:48

## 2024-02-11 RX ADMIN — Medication 110 MILLIGRAM(S): at 09:48

## 2024-02-11 RX ADMIN — OXYCODONE HYDROCHLORIDE 5 MILLIGRAM(S): 5 TABLET ORAL at 21:50

## 2024-02-11 RX ADMIN — OXYCODONE HYDROCHLORIDE 5 MILLIGRAM(S): 5 TABLET ORAL at 21:17

## 2024-02-11 RX ADMIN — Medication 650 MILLIGRAM(S): at 09:48

## 2024-02-11 RX ADMIN — Medication 110 MILLIGRAM(S): at 21:15

## 2024-02-11 RX ADMIN — MEROPENEM 500 MILLIGRAM(S): 1 INJECTION INTRAVENOUS at 21:15

## 2024-02-11 RX ADMIN — SEVELAMER CARBONATE 800 MILLIGRAM(S): 2400 POWDER, FOR SUSPENSION ORAL at 17:38

## 2024-02-11 RX ADMIN — Medication 3 MILLIGRAM(S): at 21:15

## 2024-02-11 NOTE — PROGRESS NOTE ADULT - SUBJECTIVE AND OBJECTIVE BOX
CC: L facial swelling.     HPI: 72 y/o M w/ PMH of DM2, HTN, polio with paraplegia,  CKD IV, BPH, pulmonary HTN, p/w L facial swelling. Swelling started 3 days PTA  and has gotten progressively worse. C/o pain as well. Patient also has discomfort in his nose. Patient took a dose of amoxicillin outpatient. No  fever, chills, cough, runny nose, sore throat. Patient stated  that he is suppose to have a suprapubic catheter placed soon.         INTERVAL HPI/ OVERNIGHT EVENTS:  Pt was seen and examined        Vital Signs Last 24 Hrs  T(C): 36.5 (11 Feb 2024 08:01), Max: 36.7 (10 Feb 2024 17:55)  T(F): 97.7 (11 Feb 2024 08:01), Max: 98.1 (10 Feb 2024 17:55)  HR: 85 (11 Feb 2024 08:01) (81 - 87)  BP: 154/79 (11 Feb 2024 08:01) (143/70 - 154/79)  RR: 18 (11 Feb 2024 08:01) (18 - 18)  SpO2: 98% (11 Feb 2024 08:01) (95% - 98%)    Parameters below as of 11 Feb 2024 08:01  Patient On (Oxygen Delivery Method): room air            REVIEW OF SYSTEMS:  All other review of systems is negative unless indicated above.      PHYSICAL EXAM:  General: in no acute distress  Eyes: EOMI; conjunctiva and sclera clear, min L upper/lower lid   edema, no erythema or warmth   Head: Normocephalic; atraumatic  ENMT: No nasal discharge; airway clear  Neck: Supple; non tender; no masses  Respiratory: No wheezes, rales or rhonchi  Cardiovascular: Regular rate and rhythm. S1 and S2 Normal;   Gastrointestinal: Soft non-tender non-distended; Normal bowel sounds.  Ostomy in place   Genitourinary: No  suprapubic  tenderness, Murillo in place   Extremities: Min  feet edema, no erythema or warmth,  tenderness to palpation over R heel   and R ankle.   Neurological: Alert and oriented x3, speech is clear, + paraplegia   Lymph Nodes: + b/l submandibular adenopathy   Musculoskeletal: Decreased  muscle tone LE b/l,  LLE everted   Psychiatric: Cooperative    LABS:                         9.4    13.39 )-----------( 286      ( 11 Feb 2024 07:42 )             29.2     02-11    137  |  106  |  52<H>  ----------------------------<  108<H>  3.7   |  25  |  3.59<H>    Ca    7.8<L>      11 Feb 2024 07:42  Phos  4.5     02-10  Mg     1.6     02-10      Urinalysis Basic - ( 11 Feb 2024 07:42 )  Color: x / Appearance: x / SG: x / pH: x  Gluc: 108 mg/dL / Ketone: x  / Bili: x / Urobili: x   Blood: x / Protein: x / Nitrite: x   Leuk Esterase: x / RBC: x / WBC x   Sq Epi: x / Non Sq Epi: x / Bacteria: x                              9.2    14.94 )-----------( 240      ( 10 Feb 2024 07:55 )             28.3     10 Feb 2024 07:55    136    |  107    |  57     ----------------------------<  126    3.6     |  20     |  3.93     Ca    8.0        10 Feb 2024 07:55      Urinalysis Basic - ( 10 Feb 2024 07:55 )  Color: x / Appearance: x / SG: x / pH: x  Gluc: 126 mg/dL / Ketone: x  / Bili: x / Urobili: x   Blood: x / Protein: x / Nitrite: x   Leuk Esterase: x / RBC: x / WBC x   Sq Epi: x / Non Sq Epi: x / Bacteria: x    Culture - Urine (02.05.24 @ 17:06)   - Levofloxacin: R 4  - Ceftazidime: S 8  - Levofloxacin: I 2  - Ceftazidime: S 8  - Ciprofloxacin: S 0.5  - Ciprofloxacin: R 2  - Aztreonam: R >16  - Aztreonam: R >16  - Cefepime: I 16  - Cefepime: I 16  - Imipenem: S 2  - Imipenem: S <=1  - Meropenem: S 2  - Piperacillin/Tazobactam: I 32  - Piperacillin/Tazobactam: I 32  - Meropenem: S <=1  Specimen Source: OR Collect None  Culture Results:   >100,000 CFU/ml Pseudomonas aeruginosa   >100,000 CFU/ml Pseudomonas aeruginosa #2   Multiple Morphological Strains  Organism Identification: Pseudomonas aeruginosa   Pseudomonas aeruginosa  Organism: Pseudomonas aeruginosa  Organism: Pseudomonas aeruginosa      MEDICATIONS  (STANDING):  amLODIPine   Tablet 10 milliGRAM(s) Oral daily  aspirin enteric coated 81 milliGRAM(s) Oral daily  atorvastatin 40 milliGRAM(s) Oral at bedtime  dextrose 5%. 1000 milliLiter(s) (50 mL/Hr) IV Continuous <Continuous>  dextrose 5%. 1000 milliLiter(s) (100 mL/Hr) IV Continuous <Continuous>  dextrose 50% Injectable 12.5 Gram(s) IV Push once  dextrose 50% Injectable 25 Gram(s) IV Push once  dextrose 50% Injectable 25 Gram(s) IV Push once  doxycycline IVPB 100 milliGRAM(s) IV Intermittent every 12 hours  glucagon  Injectable 1 milliGRAM(s) IntraMuscular once  heparin   Injectable 5000 Unit(s) SubCutaneous every 8 hours  meropenem Injectable 500 milliGRAM(s) IV Push every 12 hours  sevelamer carbonate 800 milliGRAM(s) Oral three times a day with meals  sodium bicarbonate 650 milliGRAM(s) Oral two times a day    MEDICATIONS  (PRN):  acetaminophen     Tablet .. 650 milliGRAM(s) Oral every 6 hours PRN Mild Pain (1 - 3)  benzonatate 100 milliGRAM(s) Oral every 8 hours PRN Cough  dextrose Oral Gel 15 Gram(s) Oral once PRN Blood Glucose LESS THAN 70 milliGRAM(s)/deciliter  hydrALAZINE Injectable 10 milliGRAM(s) IV Push every 6 hours PRN for systolic BP greater than 160  melatonin 3 milliGRAM(s) Oral at bedtime PRN Sleep  oxyCODONE    IR 5 milliGRAM(s) Oral every 6 hours PRN Moderate Pain (4 - 6)  oxyCODONE    IR 10 milliGRAM(s) Oral every 6 hours PRN Severe Pain (7 - 10)      RADIOLOGY & ADDITIONAL TESTS:      ACC: [id number] EXAM:  US DPLX LWR EXT VEINS LTD RT   ORDERED BY: AVA BOOGIE     PROCEDURE DATE:  02/09/2024          INTERPRETATION:  CLINICAL INFORMATION: Right leg pain. Evaluate for DVT.    COMPARISON: None available.    TECHNIQUE: Duplex sonography of the RIGHT LOWER extremity veins with   color and spectral Doppler, with and without compression.    FINDINGS:    There is normal compressibility of the right common femoral, femoral and   popliteal veins.  The contralateral common femoral vein is patent.  Doppler examination shows normal spontaneous and phasic flow.    No calf vein thrombosis is detected. Please note that the gastrocnemius   and soleal veins are not visualized, precluding assessment.    IMPRESSION:  No evidence of right lower extremity deep venous thrombosis. See   described limitations above.      ACC: [id number] EXAM:  US DPLX CAROTIDS COMPL BI   ORDERED BY: ALFREDO CARLTON     PROCEDURE DATE:  02/06/2024          INTERPRETATION:  CLINICAL INFORMATION: Confirm a high grade carotid   artery stenosis identified on a maxillofacial CT examination, dated   2/4/2024    COMPARISON: None available.    TECHNIQUE: Grayscale, color and spectral Doppler examination of both   carotid arteries was performed.    FINDINGS:    Prominent calcified atheromatous plaques impeding antegrade flow are   present bilaterally in the right and left internal carotid arteries in   the regions of the bifurcations.    Peak systolic velocities are as follows:    RIGHT:  PROX CCA = 94.82  DIST CCA = 105.01  PROX ICA = 263/30  DIST ICA = 136.92  ECA = 318    LEFT:  PROX CCA = 113.88  DIST CCA = 123.20  PROX ICA = 607/160  DIST ICA = 151.21  ECA = 164.87    There is retrograde flow in the right vertebral artery.    There is antegrade flow in the left vertebral artery.    IMPRESSION:    There are high grade, greater than 70% stenoses of both the right and   left internal carotid arteries.    There are flow-limiting stenoses of the right and left external carotid   arteries.    There is retrograde flow in the right vertebral artery.    A dedicated CTA or MRA of the great vessels arising from the arch is   recommended to confirm the high grade stenoses of the right and left   internal carotid arteries and to investigate the retrograde flow in the   right vertebral artery with possible steal phenomenon.    ACC: [id number] EXAM:  US KIDNEYS AND BLADDER   ORDERED BY: ALLI HAYES     PROCEDURE DATE:  02/04/2024          INTERPRETATION:  CLINICAL INFORMATION: Evaluate for hydro-    COMPARISON: 7/30/2018    TECHNIQUE: Sonography of the kidneys and bladder.    FINDINGS:  Right kidney: 13.1 cm. upper pole simple appearing cyst measuring 3.6 x   3.2 x 4.4 cm and severe hydronephrosis    Left kidney: 12.4 cm. severe hydronephrosis    Urinary bladder: Only the right ureteral jets were seen. Prevoid volume   is 1871 cc    IMPRESSION:  Right kidney exophytic cyst appears simple. Bilateral severe   hydronephrosis. Only a right ureteral jet was seen. Large prevoid volume        ACC: [id number] EXAM:  CT MAXILLOFACIAL IC   ORDERED BY: ILSA MIRANDA     PROCEDURE DATE:  02/04/2024          INTERPRETATION:  History: Swelling of the left face, question facial   abscess near the nose.  73-year-old with past medical history for   diabetes, hypertension, polio presents emergency department with left   facial swelling. Patient started with symptoms approximately 3 days ago.   Patient noticed the painful area left nose. Now patient with more   swelling left side of face. Patient did take some amoxicillin at home   which helped but did not take last night and now swelling is worse today.    Description: A postcontrast CT scan of the maxillofacial region was   performed.    No prior maxillofacial imaging study was available for comparison at this   Medical Center.    Axial images with coronal and sagittal reformatted series were obtained.    90 cc intravenous Omnipaque 350 contrast was administered.    Extensive nasal and perinasal soft tissue swelling is noted. Soft tissue   swelling also involves the left preseptal periorbital soft tissues,   without evidence for post septal extension at this time. This most likely   reflects a soft tissue infectious phlegmon-cellulitis, without focal   abscess collection at this time.    Both maxillary sinuses are severely opacified. The secretions demonstrate   increased density on the right, which may reflect chronicity-inspissation   versus chronic fungal sinus disease. The walls of the maxillary sinuses   are mildly thickened suggesting a chronic sinusitis component. Mild   mucosal thickening involves the ethmoid sinuses. The sphenoid sinus is   overall well aerated.    Bilateral nasal bone deformities are noted which may reflect old   fractures (there is no submitted clinicalhistory of recent facial   trauma).    A high-grade stenosis involves the origin of the left internal carotid   artery, likely measuring greater than 80% via NASCET criteria. Calcified   plaque and moderate stenosis involves the origin of the right internal   carotid artery. Correlation with carotid ultrasound is recommended.    Impacted horizontally oriented wisdom teeth involve the bilateral   maxilla-floor of the maxillary sinuses.    IMPRESSION:    Extensive nasal and perinasal soft tissue swelling is noted. Soft tissue   swelling also involves the left preseptal periorbital soft tissues,   without evidence for post septal extension at this time. This most likely   reflects a soft tissue infectious phlegmon-cellulitis, without focal   abscess collection at this time.    Both maxillary sinuses are severely opacified. The secretions demonstrate   increased density on the right, which may reflect chronicity-inspissation   versus chronic fungal sinus disease. The walls of the maxillary sinuses   are mildly thickened suggesting a chronic sinusitis component.      ACC: [id number] EXAM:  CT ABDOMEN AND PELVIS   ORDERED BY: ILSA MIRANDA     PROCEDURE DATE:  02/04/2024          INTERPRETATION:  CLINICAL INFORMATION: Elevated creatinine, urinary   retention    COMPARISON: 7/26/2018    CONTRAST/COMPLICATIONS:  IV Contrast: NONE  Oral Contrast: NONE  Complications: None reported at time of study completion    PROCEDURE:  CT of the Abdomen and Pelvis was performed.  Sagittal and coronal reformats were performed.    FINDINGS:  LOWER CHEST: Within normal limits.    LIVER: Within normal limits.  BILE DUCTS: Normal caliber.  GALLBLADDER: Cholelithiasis.  SPLEEN: Within normal limits.  PANCREAS: Within normal limits.  ADRENALS: Within normal limits.  KIDNEYS/URETERS: Marked bilateral hydronephrosis, as noted previously, to   the level of the bladder, increased in extent since the previous study   with increased distention and dilatation of the ureters. Increased size   of slightly hyperattenuating lesion from the upper pole of the right   kidney, measuring 3.8 cm, compared to 1.6 cm previously. Smaller,   bilateral simple cysts.    BLADDER: Distended with trabeculations  REPRODUCTIVE ORGANS: Enlarged prostate gland with seeds in place    BOWEL: No bowel obstruction.  PERITONEUM: No ascites.  VESSELS: Within normal limits.  RETROPERITONEUM/LYMPH NODES: No lymphadenopathy.  ABDOMINAL WALL: Within normal limits.  BONES: Degenerative changes. Bilateral marked muscular atrophy in the hip   joints.    IMPRESSION:  1.  Marked bilateral hydronephrosis to the level of the bladder,   increased in severity and extent since the previous study.  2.  Increased size of a hyperattenuating exophytic right renal lesion,   possible hyper proteinaceous cyst. Would recommend elective repeat   ultrasound.   CC: L facial swelling.     HPI: 72 y/o M w/ PMH of DM2, HTN, polio with paraplegia,  CKD IV, BPH, pulmonary HTN, p/w L facial swelling. Swelling started 3 days PTA  and has gotten progressively worse. C/o pain as well. Patient also has discomfort in his nose. Patient took a dose of amoxicillin outpatient. No  fever, chills, cough, runny nose, sore throat. Patient stated  that he is suppose to have a suprapubic catheter placed soon.     INTERVAL HPI/ OVERNIGHT EVENTS:  Pt was seen and examined reports feels overall better, still with R heel pain at night mostly. First states that was able to sleep better, but then states that gabapentin is not working as was taking it at home for another leg. Again discussed possible causes of R heel pain bu  Pt wants to find " fixable" cause. Pt is arguing that keeping heels of loaded  did not work in the past. Also states that wearing a boot is uncomfortable. Agrees to try flexeril   Also discussed BP meds compliance and risk of clots formation w/o DVT PPX       Vital Signs Last 24 Hrs  T(C): 36.5 (11 Feb 2024 08:01), Max: 36.7 (10 Feb 2024 17:55)  T(F): 97.7 (11 Feb 2024 08:01), Max: 98.1 (10 Feb 2024 17:55)  HR: 85 (11 Feb 2024 08:01) (81 - 87)  BP: 154/79 (11 Feb 2024 08:01) (143/70 - 154/79)  RR: 18 (11 Feb 2024 08:01) (18 - 18)  SpO2: 98% (11 Feb 2024 08:01) (95% - 98%)    Parameters below as of 11 Feb 2024 08:01  Patient On (Oxygen Delivery Method): room air            REVIEW OF SYSTEMS:  All other review of systems is negative unless indicated above.      PHYSICAL EXAM:  General: in no acute distress  Eyes: EOMI; conjunctiva and sclera clear, min L upper/lower lid   edema, no erythema or warmth   Head: Normocephalic; atraumatic  ENMT: No nasal discharge; airway clear  Neck: Supple; non tender; no masses  Respiratory: No wheezes, rales or rhonchi  Cardiovascular: Regular rate and rhythm. S1 and S2 Normal;   Gastrointestinal: Soft non-tender non-distended; Normal bowel sounds.  Ostomy in place   Genitourinary: No  suprapubic  tenderness, Murillo in place   Extremities: Min  feet edema, no erythema or warmth,  tenderness to palpation over R heel   and R ankle.   Neurological: Alert and oriented x3, speech is clear, + paraplegia   Lymph Nodes: + b/l submandibular adenopathy   Musculoskeletal: Decreased  muscle tone LE b/l,  LLE everted   Psychiatric: Cooperative    LABS:                         9.4    13.39 )-----------( 286      ( 11 Feb 2024 07:42 )             29.2     02-11    137  |  106  |  52<H>  ----------------------------<  108<H>  3.7   |  25  |  3.59<H>    Ca    7.8<L>      11 Feb 2024 07:42  Phos  4.5     02-10  Mg     1.6     02-10      Urinalysis Basic - ( 11 Feb 2024 07:42 )  Color: x / Appearance: x / SG: x / pH: x  Gluc: 108 mg/dL / Ketone: x  / Bili: x / Urobili: x   Blood: x / Protein: x / Nitrite: x   Leuk Esterase: x / RBC: x / WBC x   Sq Epi: x / Non Sq Epi: x / Bacteria: x                              9.2    14.94 )-----------( 240      ( 10 Feb 2024 07:55 )             28.3     10 Feb 2024 07:55    136    |  107    |  57     ----------------------------<  126    3.6     |  20     |  3.93     Ca    8.0        10 Feb 2024 07:55      Urinalysis Basic - ( 10 Feb 2024 07:55 )  Color: x / Appearance: x / SG: x / pH: x  Gluc: 126 mg/dL / Ketone: x  / Bili: x / Urobili: x   Blood: x / Protein: x / Nitrite: x   Leuk Esterase: x / RBC: x / WBC x   Sq Epi: x / Non Sq Epi: x / Bacteria: x    Culture - Urine (02.05.24 @ 17:06)   - Levofloxacin: R 4  - Ceftazidime: S 8  - Levofloxacin: I 2  - Ceftazidime: S 8  - Ciprofloxacin: S 0.5  - Ciprofloxacin: R 2  - Aztreonam: R >16  - Aztreonam: R >16  - Cefepime: I 16  - Cefepime: I 16  - Imipenem: S 2  - Imipenem: S <=1  - Meropenem: S 2  - Piperacillin/Tazobactam: I 32  - Piperacillin/Tazobactam: I 32  - Meropenem: S <=1  Specimen Source: OR Collect None  Culture Results:   >100,000 CFU/ml Pseudomonas aeruginosa   >100,000 CFU/ml Pseudomonas aeruginosa #2   Multiple Morphological Strains  Organism Identification: Pseudomonas aeruginosa   Pseudomonas aeruginosa  Organism: Pseudomonas aeruginosa  Organism: Pseudomonas aeruginosa      MEDICATIONS  (STANDING):  amLODIPine   Tablet 10 milliGRAM(s) Oral daily  aspirin enteric coated 81 milliGRAM(s) Oral daily  atorvastatin 40 milliGRAM(s) Oral at bedtime  dextrose 5%. 1000 milliLiter(s) (50 mL/Hr) IV Continuous <Continuous>  dextrose 5%. 1000 milliLiter(s) (100 mL/Hr) IV Continuous <Continuous>  dextrose 50% Injectable 12.5 Gram(s) IV Push once  dextrose 50% Injectable 25 Gram(s) IV Push once  dextrose 50% Injectable 25 Gram(s) IV Push once  doxycycline IVPB 100 milliGRAM(s) IV Intermittent every 12 hours  glucagon  Injectable 1 milliGRAM(s) IntraMuscular once  heparin   Injectable 5000 Unit(s) SubCutaneous every 8 hours  meropenem Injectable 500 milliGRAM(s) IV Push every 12 hours  sevelamer carbonate 800 milliGRAM(s) Oral three times a day with meals  sodium bicarbonate 650 milliGRAM(s) Oral two times a day    MEDICATIONS  (PRN):  acetaminophen     Tablet .. 650 milliGRAM(s) Oral every 6 hours PRN Mild Pain (1 - 3)  benzonatate 100 milliGRAM(s) Oral every 8 hours PRN Cough  dextrose Oral Gel 15 Gram(s) Oral once PRN Blood Glucose LESS THAN 70 milliGRAM(s)/deciliter  hydrALAZINE Injectable 10 milliGRAM(s) IV Push every 6 hours PRN for systolic BP greater than 160  melatonin 3 milliGRAM(s) Oral at bedtime PRN Sleep  oxyCODONE    IR 5 milliGRAM(s) Oral every 6 hours PRN Moderate Pain (4 - 6)  oxyCODONE    IR 10 milliGRAM(s) Oral every 6 hours PRN Severe Pain (7 - 10)      RADIOLOGY & ADDITIONAL TESTS:      ACC: [id number] EXAM:  US DPLX LWR EXT VEINS LTD RT   ORDERED BY: AVA BOOGIE     PROCEDURE DATE:  02/09/2024          INTERPRETATION:  CLINICAL INFORMATION: Right leg pain. Evaluate for DVT.    COMPARISON: None available.    TECHNIQUE: Duplex sonography of the RIGHT LOWER extremity veins with   color and spectral Doppler, with and without compression.    FINDINGS:    There is normal compressibility of the right common femoral, femoral and   popliteal veins.  The contralateral common femoral vein is patent.  Doppler examination shows normal spontaneous and phasic flow.    No calf vein thrombosis is detected. Please note that the gastrocnemius   and soleal veins are not visualized, precluding assessment.    IMPRESSION:  No evidence of right lower extremity deep venous thrombosis. See   described limitations above.      ACC: [id number] EXAM:  US DPLX CAROTIDS COMPL BI   ORDERED BY: ALFREDO CARLTON     PROCEDURE DATE:  02/06/2024          INTERPRETATION:  CLINICAL INFORMATION: Confirm a high grade carotid   artery stenosis identified on a maxillofacial CT examination, dated   2/4/2024    COMPARISON: None available.    TECHNIQUE: Grayscale, color and spectral Doppler examination of both   carotid arteries was performed.    FINDINGS:    Prominent calcified atheromatous plaques impeding antegrade flow are   present bilaterally in the right and left internal carotid arteries in   the regions of the bifurcations.    Peak systolic velocities are as follows:    RIGHT:  PROX CCA = 94.82  DIST CCA = 105.01  PROX ICA = 263/30  DIST ICA = 136.92  ECA = 318    LEFT:  PROX CCA = 113.88  DIST CCA = 123.20  PROX ICA = 607/160  DIST ICA = 151.21  ECA = 164.87    There is retrograde flow in the right vertebral artery.    There is antegrade flow in the left vertebral artery.    IMPRESSION:    There are high grade, greater than 70% stenoses of both the right and   left internal carotid arteries.    There are flow-limiting stenoses of the right and left external carotid   arteries.    There is retrograde flow in the right vertebral artery.    A dedicated CTA or MRA of the great vessels arising from the arch is   recommended to confirm the high grade stenoses of the right and left   internal carotid arteries and to investigate the retrograde flow in the   right vertebral artery with possible steal phenomenon.    ACC: [id number] EXAM:  US KIDNEYS AND BLADDER   ORDERED BY: ALLI HAYES     PROCEDURE DATE:  02/04/2024          INTERPRETATION:  CLINICAL INFORMATION: Evaluate for hydro-    COMPARISON: 7/30/2018    TECHNIQUE: Sonography of the kidneys and bladder.    FINDINGS:  Right kidney: 13.1 cm. upper pole simple appearing cyst measuring 3.6 x   3.2 x 4.4 cm and severe hydronephrosis    Left kidney: 12.4 cm. severe hydronephrosis    Urinary bladder: Only the right ureteral jets were seen. Prevoid volume   is 1871 cc    IMPRESSION:  Right kidney exophytic cyst appears simple. Bilateral severe   hydronephrosis. Only a right ureteral jet was seen. Large prevoid volume        ACC: [id number] EXAM:  CT MAXILLOFACIAL IC   ORDERED BY: ILSA MIRANDA     PROCEDURE DATE:  02/04/2024          INTERPRETATION:  History: Swelling of the left face, question facial   abscess near the nose.  73-year-old with past medical history for   diabetes, hypertension, polio presents emergency department with left   facial swelling. Patient started with symptoms approximately 3 days ago.   Patient noticed the painful area left nose. Now patient with more   swelling left side of face. Patient did take some amoxicillin at home   which helped but did not take last night and now swelling is worse today.    Description: A postcontrast CT scan of the maxillofacial region was   performed.    No prior maxillofacial imaging study was available for comparison at this   Medical Center.    Axial images with coronal and sagittal reformatted series were obtained.    90 cc intravenous Omnipaque 350 contrast was administered.    Extensive nasal and perinasal soft tissue swelling is noted. Soft tissue   swelling also involves the left preseptal periorbital soft tissues,   without evidence for post septal extension at this time. This most likely   reflects a soft tissue infectious phlegmon-cellulitis, without focal   abscess collection at this time.    Both maxillary sinuses are severely opacified. The secretions demonstrate   increased density on the right, which may reflect chronicity-inspissation   versus chronic fungal sinus disease. The walls of the maxillary sinuses   are mildly thickened suggesting a chronic sinusitis component. Mild   mucosal thickening involves the ethmoid sinuses. The sphenoid sinus is   overall well aerated.    Bilateral nasal bone deformities are noted which may reflect old   fractures (there is no submitted clinicalhistory of recent facial   trauma).    A high-grade stenosis involves the origin of the left internal carotid   artery, likely measuring greater than 80% via NASCET criteria. Calcified   plaque and moderate stenosis involves the origin of the right internal   carotid artery. Correlation with carotid ultrasound is recommended.    Impacted horizontally oriented wisdom teeth involve the bilateral   maxilla-floor of the maxillary sinuses.    IMPRESSION:    Extensive nasal and perinasal soft tissue swelling is noted. Soft tissue   swelling also involves the left preseptal periorbital soft tissues,   without evidence for post septal extension at this time. This most likely   reflects a soft tissue infectious phlegmon-cellulitis, without focal   abscess collection at this time.    Both maxillary sinuses are severely opacified. The secretions demonstrate   increased density on the right, which may reflect chronicity-inspissation   versus chronic fungal sinus disease. The walls of the maxillary sinuses   are mildly thickened suggesting a chronic sinusitis component.      ACC: [id number] EXAM:  CT ABDOMEN AND PELVIS   ORDERED BY: ILSA MIRANDA     PROCEDURE DATE:  02/04/2024          INTERPRETATION:  CLINICAL INFORMATION: Elevated creatinine, urinary   retention    COMPARISON: 7/26/2018    CONTRAST/COMPLICATIONS:  IV Contrast: NONE  Oral Contrast: NONE  Complications: None reported at time of study completion    PROCEDURE:  CT of the Abdomen and Pelvis was performed.  Sagittal and coronal reformats were performed.    FINDINGS:  LOWER CHEST: Within normal limits.    LIVER: Within normal limits.  BILE DUCTS: Normal caliber.  GALLBLADDER: Cholelithiasis.  SPLEEN: Within normal limits.  PANCREAS: Within normal limits.  ADRENALS: Within normal limits.  KIDNEYS/URETERS: Marked bilateral hydronephrosis, as noted previously, to   the level of the bladder, increased in extent since the previous study   with increased distention and dilatation of the ureters. Increased size   of slightly hyperattenuating lesion from the upper pole of the right   kidney, measuring 3.8 cm, compared to 1.6 cm previously. Smaller,   bilateral simple cysts.    BLADDER: Distended with trabeculations  REPRODUCTIVE ORGANS: Enlarged prostate gland with seeds in place    BOWEL: No bowel obstruction.  PERITONEUM: No ascites.  VESSELS: Within normal limits.  RETROPERITONEUM/LYMPH NODES: No lymphadenopathy.  ABDOMINAL WALL: Within normal limits.  BONES: Degenerative changes. Bilateral marked muscular atrophy in the hip   joints.    IMPRESSION:  1.  Marked bilateral hydronephrosis to the level of the bladder,   increased in severity and extent since the previous study.  2.  Increased size of a hyperattenuating exophytic right renal lesion,   possible hyper proteinaceous cyst. Would recommend elective repeat   ultrasound.        ACC: [id number] EXAM:  XR FOOT 2 VIEWS RT   ORDERED BY: AVA BOOGIE     PROCEDURE DATE:  02/09/2024          INTERPRETATION:  INDICATION: Right heel pain and erythema. Rule out   fracture or infection    TECHNIQUE: 3 views of the right foot    COMPARISON: None.    FINDINGS: Surgical hardware is present in the region of the talocalcaneal   joint. There is bony fusion/syndesmosis deformity of most of the   hindfoot/distal tibia and fibula. No fracture is seen. No cortical   irregularity or erosion is seen to suggest osteomyelitis. Vascular   calcifications are noted. No subcutaneous tracking gas is seen.    IMPRESSION: No fracture or radiographic evidence of infection visualized   in the right foot with attention to the right heel.

## 2024-02-11 NOTE — PROGRESS NOTE ADULT - SUBJECTIVE AND OBJECTIVE BOX
Patient is a 73y Male who reports no complaints as new        MEDICATIONS  (STANDING):  amLODIPine   Tablet 10 milliGRAM(s) Oral daily  aspirin enteric coated 81 milliGRAM(s) Oral daily  atorvastatin 40 milliGRAM(s) Oral at bedtime  dextrose 5%. 1000 milliLiter(s) (50 mL/Hr) IV Continuous <Continuous>  dextrose 5%. 1000 milliLiter(s) (100 mL/Hr) IV Continuous <Continuous>  dextrose 50% Injectable 25 Gram(s) IV Push once  dextrose 50% Injectable 25 Gram(s) IV Push once  dextrose 50% Injectable 12.5 Gram(s) IV Push once  doxycycline IVPB 100 milliGRAM(s) IV Intermittent every 12 hours  glucagon  Injectable 1 milliGRAM(s) IntraMuscular once  heparin   Injectable 5000 Unit(s) SubCutaneous every 8 hours  meropenem Injectable 500 milliGRAM(s) IV Push every 12 hours  sevelamer carbonate 800 milliGRAM(s) Oral three times a day with meals  sodium bicarbonate 650 milliGRAM(s) Oral two times a day      Vital Signs Last 24 Hrs  T(C): 36.9 (08 Feb 2024 16:00), Max: 37.5 (08 Feb 2024 00:00)  T(F): 98.4 (08 Feb 2024 16:00), Max: 99.5 (08 Feb 2024 00:00)  HR: 85 (08 Feb 2024 16:00) (85 - 94)  BP: 149/60 (08 Feb 2024 16:00) (149/60 - 155/69)  BP(mean): 87 (08 Feb 2024 16:00) (87 - 87)  RR: 18 (08 Feb 2024 16:00) (18 - 18)  SpO2: 99% (08 Feb 2024 16:00) (98% - 100%)    Parameters below as of 08 Feb 2024 16:00  Patient On (Oxygen Delivery Method): room air      PHYSICAL EXAM:    Constitutional: NAD, well-groomed, well-developed  HEENT: PERRLA, EOMI,  MMM  Neck: No LAD, No JVD  Respiratory: CTAB  Cardiovascular: S1 and S2, RRR  Gastrointestinal: ostomy  Extremities: No peripheral edema  Neurological: A/O x 3n   : Murillo          LABS:                          9.4    13.39 )-----------( 286      ( 11 Feb 2024 07:42 )             29.2           137    |  106    |  52     ----------------------------<  108       11 Feb 2024 07:42  3.7     |  25     |  3.59     136    |  107    |  57     ----------------------------<  126       10 Feb 2024 07:55  3.6     |  20     |  3.93     Ca    8.0        12 Feb 2024 05:42  Ca    7.8        11 Feb 2024 07:42    Phos  4.5       10 Feb 2024 07:55    Mg     1.7       12 Feb 2024 05:42  Mg     1.6       10 Feb 2024 07:55              Urine Studies:  Urinalysis Basic - ( 07 Feb 2024 07:22 )    Color: x / Appearance: x / SG: x / pH: x  Gluc: 121 mg/dL / Ketone: x  / Bili: x / Urobili: x   Blood: x / Protein: x / Nitrite: x   Leuk Esterase: x / RBC: x / WBC x   Sq Epi: x / Non Sq Epi: x / Bacteria: x            RADIOLOGY & ADDITIONAL STUDIES:    ACC: [id number] EXAM:  US KIDNEYS AND BLADDER   ORDERED BY: ALLI HAYES     PROCEDURE DATE:  02/04/2024          INTERPRETATION:  CLINICAL INFORMATION: Evaluate for hydro-    COMPARISON: 7/30/2018    TECHNIQUE: Sonography of the kidneys and bladder.    FINDINGS:  Right kidney: 13.1 cm. upper pole simple appearing cyst measuring 3.6 x   3.2 x 4.4 cm and severe hydronephrosis    Left kidney: 12.4 cm. severe hydronephrosis    Urinary bladder: Only the right ureteral jets were seen. Prevoid volume   is 1871 cc    IMPRESSION:  Right kidney exophytic cyst appears simple. Bilateral severe   hydronephrosis. Only a right ureteral jet was seen. Large prevoid volume

## 2024-02-11 NOTE — PROGRESS NOTE ADULT - ASSESSMENT
74 y/o M w/ PMH of DM2, HTN, polio, CKD IV, BPH, pulmonary HTN, p/w L facial swelling with renal evaluation of CKD IV setting of DM and chronic untreated obstructive uropathy.    CKD IV sec to chronic obstructive uropathy     CKD progression likely as Cr 3's in 2022     vs SONDRA on CKD 4  - monitor for recovery   Cr continues to trend down  -Murillo --> SPT as per    -Ostomy w intra op rectal injury  -encourage po - pt is drinking well   -NO nsaids, avoid contrast  - sevelamer for hyperphos - monitor as Cr improves     Met Acidosis - on po bicarb    Cellulitis  -Abx per medical teams    d/w  RN staff, wife at length  -   As per Dr Clarence ryan - pt is aware he needs close CKD follow up on discharge -  pt is high risk for progression to ESRD needing RRT but pt has hx of poor compliance in past     * pt seen earlier, note now

## 2024-02-11 NOTE — PROGRESS NOTE ADULT - ASSESSMENT
73 M with Hx of DM2, HTN, polio with chronic LE paralysis, CKD IV with baseline Cr around 4, BPH, pulmonary HTN was admitted with L facial swelling and left periorbital cellulitis.  He underwent attempt for suprapubic catheter placement for hydronephrosis and SONDRA/CKD.  Hospital course complicated by intraoperative rectal injury, now status post diverting ostomy.    1. L Periorbital cellulitis, clinically improving   -  clinically on ABXs, S/p Zosyn, now on  Meropenem (day #4) and Doxy (day #7)  -  afebrile, WBC coming down to 13k  -  blood cultures from 2/4 are negative  -  CT reports extensive nasal and perinasal soft tissue swelling. +soft tissue swelling involves L pre-septal periorbital soft tissues without evidence for post septal extension.   -  called [facility] for Optho consult (2/5) Patient does not need transfer for evaluation  -  CT also reports that both maxillary sinuses are severely opacified w/ possible inspissation vs chronic fungal disease + chronic sinusitis  - Pt will need to f/u with ENT outPt     2. High grade Bilateral internal carotid artery stenoses  -  s/p neuro and vascular consults  -  patient has refused MRI/MRA brain  -  continue ASA and statin  -  vascular followup with Dr. Leila Orona upon discharge     3.  SONDRA due to B/L hydronephrosis + Exophytic Renal lesion + Suspected SONDRA on CKD stage IV. Hyperphosphatemia   -  CT reports marked B/L hydronephrosis increased in severity and extent, increased size of a hyperattenuating exophytic right renal lesion 3.8cm  -  s/p  and renal consults  -  s/p attempt for suprapubic catheter placement, complicated by bowel injury requiring procedure to be aborted and now s/p colostomy  -  maintain Murillo in place for now  -  monitor urine output and renal function  -  creatinine improved and stable  -  avoid NSAIDs and nephrotoxic meds  -  continue Sevalemer  -  will eventually need a fistula as he is high risk for progression to ESRD  -  IR recs appreciated  plan to do IR SPC placement prior to discharge home ( On IV Abxs  now)     4. Complicated MDRO  Pseudomonas UTI due to obstructive   uropathy   -  maintain isolation   -  ABXs changed to Meropenem  on 2/8 (day #4)  -  Plan for  5-7 days of IV Meropenem  - Above plan D/w Dr Torrez     5. S/P Acute Rectal Injury, s/p diverting colostomy  -  wound care per colorectal  -  tolerating diet  -  pain control  -  colostomy care and teaching done by Rakan Padilla, RN     6. Hypertension  -  BP  improved  -C/w  Norvasc 10mg daily  - Hydralazine PRN     7. R heel pain   suspect due to pressure injury vs neuropathy   No clinical signs of cellulitis  Off load heels, turn and position Q 3H  bed with air matrass changed this am   Will order boot   Start trial of gabapentin   Dilaudid changed to PO Oxy PRN  Tylenol PRN      8. Cholelithiasis  -  asymptomatic, f/u outpatient     9. H/o Type 2 Diabetes Mellitus  HB A1c 4.9   regular diet     10.  Metabolic Acidosis  - due to advanced CKD, stable  -  continue sodium bicarbonate tabs    11. Anemia of Chronic Disease  -  due to advanced CKD, stable, monitor for now    12. Hx of Polio  -  has chronic paraplegia  -  frequent repositioning and out of bed to chair to minimize pressure ulcer injuries        DVT prophylaxis  -  venodynes, ordered SQ Heparin, but patient is reluctant to take it, he has been counseled on the risk of developing a DVT/PE while he is bedbound    Dispo:  OOB to chair, frequent repositioning, requires to stay inpatient to complete IV Meropenem course and will need IR guided SPC placement       73 M with Hx of DM2, HTN, polio with chronic LE paralysis, CKD IV with baseline Cr around 4, BPH, pulmonary HTN was admitted with L facial swelling and left periorbital cellulitis.  He underwent attempt for suprapubic catheter placement for hydronephrosis and SONDRA/CKD.  Hospital course complicated by intraoperative rectal injury, now status post diverting ostomy.    1. L Periorbital cellulitis, clinically improving   -  clinically on ABXs, S/p Zosyn, now on  Meropenem (day #4) and Doxy (day #7)  -  afebrile, WBC coming down to 13k  -  blood cultures from 2/4 are negative  -  CT reports extensive nasal and perinasal soft tissue swelling. +soft tissue swelling involves L pre-septal periorbital soft tissues without evidence for post septal extension.   -  CT also reports that both maxillary sinuses are severely opacified w/ possible inspissation vs chronic fungal disease + chronic sinusitis  - Pt will need to f/u with ENT outPt     2. High grade Bilateral internal carotid artery stenoses  -  s/p neuro and vascular consults  -  patient has refused MRI/MRA brain  -  continue ASA and statin  -  vascular followup with Dr. Leila Orona upon discharge     3.  SONDRA due to B/L hydronephrosis + Exophytic Renal lesion + Suspected SONDRA on CKD stage IV. Hyperphosphatemia   -  CT reports marked B/L hydronephrosis increased in severity and extent, increased size of a hyperattenuating exophytic right renal lesion 3.8cm  -  s/p  and renal consults  -  s/p attempt for suprapubic catheter placement, complicated by bowel injury requiring procedure to be aborted and now s/p colostomy  -  maintain Murillo in place for now  -  monitor urine output and renal function  -  creatinine improved and stable  -  avoid NSAIDs and nephrotoxic meds  -  continue Sevalemer  -  will eventually need a fistula as he is high risk for progression to ESRD  -  IR recs appreciated  plan to do IR SPC placement prior to discharge home ( On IV Abxs  now)     4. Complicated MDRO  Pseudomonas UTI due to obstructive   uropathy   -  maintain isolation   -  ABXs changed to Meropenem  on 2/8 (day #4)  -  Plan for  5-7 days of IV Meropenem  - Above plan D/w Dr Torrez     5. S/P Acute Rectal Injury, s/p diverting colostomy  -  wound care per colorectal  -  tolerating diet  -  pain control  -  colostomy care and teaching done by Rakan Padilla, RN     6. Hypertension  -  BP  improved  -C/w  Norvasc 10mg daily  - Hydralazine PRN     7. R heel pain   suspect due to pressure injury vs neuropathy vs Muscle spasm  No clinical signs of cellulitis  Off load heels, turn and position Q 3H  bed with air matrass   Will order boot   Start trial of gabapentin   Dilaudid changed to PO Oxy PRN  Tylenol PRN      8. Cholelithiasis  -  asymptomatic, f/u outpatient     9. H/o Type 2 Diabetes Mellitus  HB A1c 4.9   regular diet     10.  Metabolic Acidosis  - due to advanced CKD, stable  -  continue sodium bicarbonate tabs    11. Anemia of Chronic Disease  -  due to advanced CKD, stable, monitor for now    12. Hx of Polio  -  has chronic paraplegia  -  frequent repositioning and out of bed to chair to minimize pressure ulcer injuries      DVT prophylaxis  -  venodynes, ordered SQ Heparin, but patient is reluctant to take it, he has been counseled on the risk of developing a DVT/PE while he is bedbound    Dispo:  OOB to chair, frequent repositioning, requires to stay inpatient to complete IV Meropenem course and will need IR guided SPC placement

## 2024-02-12 LAB
ANION GAP SERPL CALC-SCNC: 7 MMOL/L — SIGNIFICANT CHANGE UP (ref 5–17)
BUN SERPL-MCNC: 54 MG/DL — HIGH (ref 7–23)
CALCIUM SERPL-MCNC: 8 MG/DL — LOW (ref 8.5–10.1)
CHLORIDE SERPL-SCNC: 105 MMOL/L — SIGNIFICANT CHANGE UP (ref 96–108)
CO2 SERPL-SCNC: 25 MMOL/L — SIGNIFICANT CHANGE UP (ref 22–31)
CREAT SERPL-MCNC: 3.48 MG/DL — HIGH (ref 0.5–1.3)
EGFR: 18 ML/MIN/1.73M2 — LOW
GLUCOSE SERPL-MCNC: 97 MG/DL — SIGNIFICANT CHANGE UP (ref 70–99)
HCT VFR BLD CALC: 30.6 % — LOW (ref 39–50)
HGB BLD-MCNC: 9.8 G/DL — LOW (ref 13–17)
MAGNESIUM SERPL-MCNC: 1.7 MG/DL — SIGNIFICANT CHANGE UP (ref 1.6–2.6)
MCHC RBC-ENTMCNC: 29.2 PG — SIGNIFICANT CHANGE UP (ref 27–34)
MCHC RBC-ENTMCNC: 32 GM/DL — SIGNIFICANT CHANGE UP (ref 32–36)
MCV RBC AUTO: 91.1 FL — SIGNIFICANT CHANGE UP (ref 80–100)
PLATELET # BLD AUTO: 276 K/UL — SIGNIFICANT CHANGE UP (ref 150–400)
POTASSIUM SERPL-MCNC: 4 MMOL/L — SIGNIFICANT CHANGE UP (ref 3.5–5.3)
POTASSIUM SERPL-SCNC: 4 MMOL/L — SIGNIFICANT CHANGE UP (ref 3.5–5.3)
RBC # BLD: 3.36 M/UL — LOW (ref 4.2–5.8)
RBC # FLD: 13.2 % — SIGNIFICANT CHANGE UP (ref 10.3–14.5)
SODIUM SERPL-SCNC: 137 MMOL/L — SIGNIFICANT CHANGE UP (ref 135–145)
WBC # BLD: 12.67 K/UL — HIGH (ref 3.8–10.5)
WBC # FLD AUTO: 12.67 K/UL — HIGH (ref 3.8–10.5)

## 2024-02-12 PROCEDURE — 99232 SBSQ HOSP IP/OBS MODERATE 35: CPT

## 2024-02-12 RX ORDER — MAGNESIUM SULFATE 500 MG/ML
1 VIAL (ML) INJECTION ONCE
Refills: 0 | Status: COMPLETED | OUTPATIENT
Start: 2024-02-12 | End: 2024-02-12

## 2024-02-12 RX ORDER — CYCLOBENZAPRINE HYDROCHLORIDE 10 MG/1
5 TABLET, FILM COATED ORAL
Refills: 0 | Status: DISCONTINUED | OUTPATIENT
Start: 2024-02-12 | End: 2024-02-22

## 2024-02-12 RX ADMIN — Medication 1 MILLIGRAM(S): at 09:16

## 2024-02-12 RX ADMIN — CYCLOBENZAPRINE HYDROCHLORIDE 10 MILLIGRAM(S): 10 TABLET, FILM COATED ORAL at 21:39

## 2024-02-12 RX ADMIN — PREGABALIN 1000 MICROGRAM(S): 225 CAPSULE ORAL at 09:16

## 2024-02-12 RX ADMIN — SEVELAMER CARBONATE 800 MILLIGRAM(S): 2400 POWDER, FOR SUSPENSION ORAL at 15:14

## 2024-02-12 RX ADMIN — MEROPENEM 500 MILLIGRAM(S): 1 INJECTION INTRAVENOUS at 21:38

## 2024-02-12 RX ADMIN — AMLODIPINE BESYLATE 10 MILLIGRAM(S): 2.5 TABLET ORAL at 09:17

## 2024-02-12 RX ADMIN — SEVELAMER CARBONATE 800 MILLIGRAM(S): 2400 POWDER, FOR SUSPENSION ORAL at 09:17

## 2024-02-12 RX ADMIN — Medication 110 MILLIGRAM(S): at 09:16

## 2024-02-12 RX ADMIN — Medication 650 MILLIGRAM(S): at 21:38

## 2024-02-12 RX ADMIN — Medication 3 MILLIGRAM(S): at 21:38

## 2024-02-12 RX ADMIN — Medication 81 MILLIGRAM(S): at 09:16

## 2024-02-12 RX ADMIN — Medication 650 MILLIGRAM(S): at 09:17

## 2024-02-12 RX ADMIN — ATORVASTATIN CALCIUM 40 MILLIGRAM(S): 80 TABLET, FILM COATED ORAL at 21:38

## 2024-02-12 RX ADMIN — Medication 110 MILLIGRAM(S): at 21:39

## 2024-02-12 RX ADMIN — Medication 100 GRAM(S): at 11:56

## 2024-02-12 RX ADMIN — SEVELAMER CARBONATE 800 MILLIGRAM(S): 2400 POWDER, FOR SUSPENSION ORAL at 18:28

## 2024-02-12 RX ADMIN — MEROPENEM 500 MILLIGRAM(S): 1 INJECTION INTRAVENOUS at 09:16

## 2024-02-12 RX ADMIN — OXYCODONE HYDROCHLORIDE 10 MILLIGRAM(S): 5 TABLET ORAL at 21:46

## 2024-02-12 NOTE — PROGRESS NOTE ADULT - SUBJECTIVE AND OBJECTIVE BOX
Patient seen at bedside. Pt is reports he is feeling better, reports his periorbital swelling and abdominal pain have resolved. He denies any abd/flank pain, fevers, chills.     PE  General: No distress, No anxiety  VITALS  T(C): 36.2 (02-12-24 @ 07:57), Max: 37.2 (02-11-24 @ 16:45)  HR: 98 (02-12-24 @ 07:57) (87 - 98)  BP: 166/77 (02-12-24 @ 07:57) (135/67 - 166/77)  RR: 18 (02-12-24 @ 07:57) (18 - 18)  SpO2: 95% (02-12-24 @ 07:57) (95% - 99%)            Skin     : No jaundice  Lung    : No resp distress  Abdo:   : Obese abdomen, Non tender, No distension, + colostomy in place   Genitalia Male: Murillo with clear urine no sediment or pyuria   Neuro   : A&Ox3      LABS                        9.8    12.67 )-----------( 276      ( 12 Feb 2024 05:42 )             30.6   02-12    137  |  105  |  54<H>  ----------------------------<  97  4.0   |  25  |  3.48<H>    Ca    8.0<L>      12 Feb 2024 05:42  Mg     1.7     02-12

## 2024-02-12 NOTE — PROGRESS NOTE ADULT - SUBJECTIVE AND OBJECTIVE BOX
CC: L facial swelling.     HPI: 74 y/o M w/ PMH of DM2, HTN, polio with paraplegia,  CKD IV, BPH, pulmonary HTN, p/w L facial swelling. Swelling started 3 days PTA  and has gotten progressively worse. C/o pain as well. Patient also has discomfort in his nose. Patient took a dose of amoxicillin outpatient. No  fever, chills, cough, runny nose, sore throat. Patient stated  that he is suppose to have a suprapubic catheter placed soon.     INTERVAL HPI/ OVERNIGHT EVENTS:  Pt was seen and examined reports feels overall better, still with R heel pain at night mostly. First states that was able to sleep better, but then states that gabapentin is not working as was taking it at home for another leg. Again discussed possible causes of R heel pain bu  Pt wants to find " fixable" cause. Pt is arguing that keeping heels of loaded  did not work in the past. Also states that wearing a boot is uncomfortable. Agrees to try flexeril   Also discussed BP meds compliance and risk of clots formation w/o DVT PPX       Vital Signs Last 24 Hrs  T(C): 36.2 (12 Feb 2024 07:57), Max: 37.2 (11 Feb 2024 16:45)  T(F): 97.2 (12 Feb 2024 07:57), Max: 99 (11 Feb 2024 16:45)  HR: 98 (12 Feb 2024 07:57) (87 - 98)  BP: 166/77 (12 Feb 2024 07:57) (135/67 - 166/77)  BP(mean): 83 (11 Feb 2024 16:45) (83 - 83)  RR: 18 (12 Feb 2024 07:57) (18 - 18)  SpO2: 95% (12 Feb 2024 07:57) (95% - 99%)    Parameters below as of 12 Feb 2024 07:57  Patient On (Oxygen Delivery Method): room air        REVIEW OF SYSTEMS:  All other review of systems is negative unless indicated above.      PHYSICAL EXAM:  General: in no acute distress  Eyes: EOMI; conjunctiva and sclera clear, min L upper/lower lid   edema, no erythema or warmth   Head: Normocephalic; atraumatic  ENMT: No nasal discharge; airway clear  Neck: Supple; non tender; no masses  Respiratory: No wheezes, rales or rhonchi  Cardiovascular: Regular rate and rhythm. S1 and S2 Normal;   Gastrointestinal: Soft non-tender non-distended; Normal bowel sounds.  Ostomy in place   Genitourinary: No  suprapubic  tenderness, Murillo in place   Extremities: Min  feet edema, no erythema or warmth,  tenderness to palpation over R heel   and R ankle.   Neurological: Alert and oriented x3, speech is clear, + paraplegia   Lymph Nodes: + b/l submandibular adenopathy   Musculoskeletal: Decreased  muscle tone LE b/l,  LLE everted   Psychiatric: Cooperative    LABS:                           9.8    12.67 )-----------( 276      ( 12 Feb 2024 05:42 )             30.6     02-12    137  |  105  |  54<H>  ----------------------------<  97  4.0   |  25  |  3.48<H>    Ca    8.0<L>      12 Feb 2024 05:42  Mg     1.7     02-12        Urinalysis Basic - ( 12 Feb 2024 05:42 )  Color: x / Appearance: x / SG: x / pH: x  Gluc: 97 mg/dL / Ketone: x  / Bili: x / Urobili: x   Blood: x / Protein: x / Nitrite: x   Leuk Esterase: x / RBC: x / WBC x   Sq Epi: x / Non Sq Epi: x / Bacteria: x                          9.4    13.39 )-----------( 286      ( 11 Feb 2024 07:42 )             29.2     02-11    137  |  106  |  52<H>  ----------------------------<  108<H>  3.7   |  25  |  3.59<H>    Ca    7.8<L>      11 Feb 2024 07:42  Phos  4.5     02-10  Mg     1.6     02-10      Urinalysis Basic - ( 11 Feb 2024 07:42 )  Color: x / Appearance: x / SG: x / pH: x  Gluc: 108 mg/dL / Ketone: x  / Bili: x / Urobili: x   Blood: x / Protein: x / Nitrite: x   Leuk Esterase: x / RBC: x / WBC x   Sq Epi: x / Non Sq Epi: x / Bacteria: x                              9.2    14.94 )-----------( 240      ( 10 Feb 2024 07:55 )             28.3     10 Feb 2024 07:55    136    |  107    |  57     ----------------------------<  126    3.6     |  20     |  3.93     Ca    8.0        10 Feb 2024 07:55      Urinalysis Basic - ( 10 Feb 2024 07:55 )  Color: x / Appearance: x / SG: x / pH: x  Gluc: 126 mg/dL / Ketone: x  / Bili: x / Urobili: x   Blood: x / Protein: x / Nitrite: x   Leuk Esterase: x / RBC: x / WBC x   Sq Epi: x / Non Sq Epi: x / Bacteria: x    Culture - Urine (02.05.24 @ 17:06)   - Levofloxacin: R 4  - Ceftazidime: S 8  - Levofloxacin: I 2  - Ceftazidime: S 8  - Ciprofloxacin: S 0.5  - Ciprofloxacin: R 2  - Aztreonam: R >16  - Aztreonam: R >16  - Cefepime: I 16  - Cefepime: I 16  - Imipenem: S 2  - Imipenem: S <=1  - Meropenem: S 2  - Piperacillin/Tazobactam: I 32  - Piperacillin/Tazobactam: I 32  - Meropenem: S <=1  Specimen Source: OR Collect None  Culture Results:   >100,000 CFU/ml Pseudomonas aeruginosa   >100,000 CFU/ml Pseudomonas aeruginosa #2   Multiple Morphological Strains  Organism Identification: Pseudomonas aeruginosa   Pseudomonas aeruginosa  Organism: Pseudomonas aeruginosa  Organism: Pseudomonas aeruginosa      MEDICATIONS  (STANDING):  amLODIPine   Tablet 10 milliGRAM(s) Oral daily  aspirin enteric coated 81 milliGRAM(s) Oral daily  atorvastatin 40 milliGRAM(s) Oral at bedtime  dextrose 5%. 1000 milliLiter(s) (50 mL/Hr) IV Continuous <Continuous>  dextrose 5%. 1000 milliLiter(s) (100 mL/Hr) IV Continuous <Continuous>  dextrose 50% Injectable 12.5 Gram(s) IV Push once  dextrose 50% Injectable 25 Gram(s) IV Push once  dextrose 50% Injectable 25 Gram(s) IV Push once  doxycycline IVPB 100 milliGRAM(s) IV Intermittent every 12 hours  glucagon  Injectable 1 milliGRAM(s) IntraMuscular once  heparin   Injectable 5000 Unit(s) SubCutaneous every 8 hours  meropenem Injectable 500 milliGRAM(s) IV Push every 12 hours  sevelamer carbonate 800 milliGRAM(s) Oral three times a day with meals  sodium bicarbonate 650 milliGRAM(s) Oral two times a day    MEDICATIONS  (PRN):  acetaminophen     Tablet .. 650 milliGRAM(s) Oral every 6 hours PRN Mild Pain (1 - 3)  benzonatate 100 milliGRAM(s) Oral every 8 hours PRN Cough  dextrose Oral Gel 15 Gram(s) Oral once PRN Blood Glucose LESS THAN 70 milliGRAM(s)/deciliter  hydrALAZINE Injectable 10 milliGRAM(s) IV Push every 6 hours PRN for systolic BP greater than 160  melatonin 3 milliGRAM(s) Oral at bedtime PRN Sleep  oxyCODONE    IR 5 milliGRAM(s) Oral every 6 hours PRN Moderate Pain (4 - 6)  oxyCODONE    IR 10 milliGRAM(s) Oral every 6 hours PRN Severe Pain (7 - 10)      RADIOLOGY & ADDITIONAL TESTS:      ACC: [id number] EXAM:  US DPLX LWR EXT VEINS LTD RT   ORDERED BY: AVA BOOGIE     PROCEDURE DATE:  02/09/2024          INTERPRETATION:  CLINICAL INFORMATION: Right leg pain. Evaluate for DVT.    COMPARISON: None available.    TECHNIQUE: Duplex sonography of the RIGHT LOWER extremity veins with   color and spectral Doppler, with and without compression.    FINDINGS:    There is normal compressibility of the right common femoral, femoral and   popliteal veins.  The contralateral common femoral vein is patent.  Doppler examination shows normal spontaneous and phasic flow.    No calf vein thrombosis is detected. Please note that the gastrocnemius   and soleal veins are not visualized, precluding assessment.    IMPRESSION:  No evidence of right lower extremity deep venous thrombosis. See   described limitations above.      ACC: [id number] EXAM:  US DPLX CAROTIDS COMPL BI   ORDERED BY: ALFREDO CARLTON     PROCEDURE DATE:  02/06/2024          INTERPRETATION:  CLINICAL INFORMATION: Confirm a high grade carotid   artery stenosis identified on a maxillofacial CT examination, dated   2/4/2024    COMPARISON: None available.    TECHNIQUE: Grayscale, color and spectral Doppler examination of both   carotid arteries was performed.    FINDINGS:    Prominent calcified atheromatous plaques impeding antegrade flow are   present bilaterally in the right and left internal carotid arteries in   the regions of the bifurcations.    Peak systolic velocities are as follows:    RIGHT:  PROX CCA = 94.82  DIST CCA = 105.01  PROX ICA = 263/30  DIST ICA = 136.92  ECA = 318    LEFT:  PROX CCA = 113.88  DIST CCA = 123.20  PROX ICA = 607/160  DIST ICA = 151.21  ECA = 164.87    There is retrograde flow in the right vertebral artery.    There is antegrade flow in the left vertebral artery.    IMPRESSION:    There are high grade, greater than 70% stenoses of both the right and   left internal carotid arteries.    There are flow-limiting stenoses of the right and left external carotid   arteries.    There is retrograde flow in the right vertebral artery.    A dedicated CTA or MRA of the great vessels arising from the arch is   recommended to confirm the high grade stenoses of the right and left   internal carotid arteries and to investigate the retrograde flow in the   right vertebral artery with possible steal phenomenon.    ACC: [id number] EXAM:  US KIDNEYS AND BLADDER   ORDERED BY: ALLI HAYES     PROCEDURE DATE:  02/04/2024          INTERPRETATION:  CLINICAL INFORMATION: Evaluate for hydro-    COMPARISON: 7/30/2018    TECHNIQUE: Sonography of the kidneys and bladder.    FINDINGS:  Right kidney: 13.1 cm. upper pole simple appearing cyst measuring 3.6 x   3.2 x 4.4 cm and severe hydronephrosis    Left kidney: 12.4 cm. severe hydronephrosis    Urinary bladder: Only the right ureteral jets were seen. Prevoid volume   is 1871 cc    IMPRESSION:  Right kidney exophytic cyst appears simple. Bilateral severe   hydronephrosis. Only a right ureteral jet was seen. Large prevoid volume        ACC: [id number] EXAM:  CT MAXILLOFACIAL IC   ORDERED BY: ILSA MIRANDA     PROCEDURE DATE:  02/04/2024          INTERPRETATION:  History: Swelling of the left face, question facial   abscess near the nose.  73-year-old with past medical history for   diabetes, hypertension, polio presents emergency department with left   facial swelling. Patient started with symptoms approximately 3 days ago.   Patient noticed the painful area left nose. Now patient with more   swelling left side of face. Patient did take some amoxicillin at home   which helped but did not take last night and now swelling is worse today.    Description: A postcontrast CT scan of the maxillofacial region was   performed.    No prior maxillofacial imaging study was available for comparison at this   Medical Center.    Axial images with coronal and sagittal reformatted series were obtained.    90 cc intravenous Omnipaque 350 contrast was administered.    Extensive nasal and perinasal soft tissue swelling is noted. Soft tissue   swelling also involves the left preseptal periorbital soft tissues,   without evidence for post septal extension at this time. This most likely   reflects a soft tissue infectious phlegmon-cellulitis, without focal   abscess collection at this time.    Both maxillary sinuses are severely opacified. The secretions demonstrate   increased density on the right, which may reflect chronicity-inspissation   versus chronic fungal sinus disease. The walls of the maxillary sinuses   are mildly thickened suggesting a chronic sinusitis component. Mild   mucosal thickening involves the ethmoid sinuses. The sphenoid sinus is   overall well aerated.    Bilateral nasal bone deformities are noted which may reflect old   fractures (there is no submitted clinicalhistory of recent facial   trauma).    A high-grade stenosis involves the origin of the left internal carotid   artery, likely measuring greater than 80% via NASCET criteria. Calcified   plaque and moderate stenosis involves the origin of the right internal   carotid artery. Correlation with carotid ultrasound is recommended.    Impacted horizontally oriented wisdom teeth involve the bilateral   maxilla-floor of the maxillary sinuses.    IMPRESSION:    Extensive nasal and perinasal soft tissue swelling is noted. Soft tissue   swelling also involves the left preseptal periorbital soft tissues,   without evidence for post septal extension at this time. This most likely   reflects a soft tissue infectious phlegmon-cellulitis, without focal   abscess collection at this time.    Both maxillary sinuses are severely opacified. The secretions demonstrate   increased density on the right, which may reflect chronicity-inspissation   versus chronic fungal sinus disease. The walls of the maxillary sinuses   are mildly thickened suggesting a chronic sinusitis component.      ACC: [id number] EXAM:  CT ABDOMEN AND PELVIS   ORDERED BY: ILSA MIRANDA     PROCEDURE DATE:  02/04/2024          INTERPRETATION:  CLINICAL INFORMATION: Elevated creatinine, urinary   retention    COMPARISON: 7/26/2018    CONTRAST/COMPLICATIONS:  IV Contrast: NONE  Oral Contrast: NONE  Complications: None reported at time of study completion    PROCEDURE:  CT of the Abdomen and Pelvis was performed.  Sagittal and coronal reformats were performed.    FINDINGS:  LOWER CHEST: Within normal limits.    LIVER: Within normal limits.  BILE DUCTS: Normal caliber.  GALLBLADDER: Cholelithiasis.  SPLEEN: Within normal limits.  PANCREAS: Within normal limits.  ADRENALS: Within normal limits.  KIDNEYS/URETERS: Marked bilateral hydronephrosis, as noted previously, to   the level of the bladder, increased in extent since the previous study   with increased distention and dilatation of the ureters. Increased size   of slightly hyperattenuating lesion from the upper pole of the right   kidney, measuring 3.8 cm, compared to 1.6 cm previously. Smaller,   bilateral simple cysts.    BLADDER: Distended with trabeculations  REPRODUCTIVE ORGANS: Enlarged prostate gland with seeds in place    BOWEL: No bowel obstruction.  PERITONEUM: No ascites.  VESSELS: Within normal limits.  RETROPERITONEUM/LYMPH NODES: No lymphadenopathy.  ABDOMINAL WALL: Within normal limits.  BONES: Degenerative changes. Bilateral marked muscular atrophy in the hip   joints.    IMPRESSION:  1.  Marked bilateral hydronephrosis to the level of the bladder,   increased in severity and extent since the previous study.  2.  Increased size of a hyperattenuating exophytic right renal lesion,   possible hyper proteinaceous cyst. Would recommend elective repeat   ultrasound.        ACC: [id number] EXAM:  XR FOOT 2 VIEWS RT   ORDERED BY: AVA BOOGIE     PROCEDURE DATE:  02/09/2024          INTERPRETATION:  INDICATION: Right heel pain and erythema. Rule out   fracture or infection    TECHNIQUE: 3 views of the right foot    COMPARISON: None.    FINDINGS: Surgical hardware is present in the region of the talocalcaneal   joint. There is bony fusion/syndesmosis deformity of most of the   hindfoot/distal tibia and fibula. No fracture is seen. No cortical   irregularity or erosion is seen to suggest osteomyelitis. Vascular   calcifications are noted. No subcutaneous tracking gas is seen.    IMPRESSION: No fracture or radiographic evidence of infection visualized   in the right foot with attention to the right heel.     CC: L facial swelling.     HPI: 72 y/o M w/ PMH of DM2, HTN, polio with paraplegia,  CKD IV, BPH, pulmonary HTN, p/w L facial swelling. Swelling started 3 days PTA  and has gotten progressively worse. C/o pain as well. Patient also has discomfort in his nose. Patient took a dose of amoxicillin outpatient. No  fever, chills, cough, runny nose, sore throat. Patient stated  that he is suppose to have a suprapubic catheter placed soon.     INTERVAL HPI/ OVERNIGHT EVENTS:  Pt was seen and examined  states that was able to sleep whole night w/o R  heel pain, feels much more rested . POC discussed     Vital Signs Last 24 Hrs  T(C): 36.2 (12 Feb 2024 07:57), Max: 37.2 (11 Feb 2024 16:45)  T(F): 97.2 (12 Feb 2024 07:57), Max: 99 (11 Feb 2024 16:45)  HR: 98 (12 Feb 2024 07:57) (87 - 98)  BP: 166/77 (12 Feb 2024 07:57) (135/67 - 166/77)  BP(mean): 83 (11 Feb 2024 16:45) (83 - 83)  RR: 18 (12 Feb 2024 07:57) (18 - 18)  SpO2: 95% (12 Feb 2024 07:57) (95% - 99%)    Parameters below as of 12 Feb 2024 07:57  Patient On (Oxygen Delivery Method): room air        REVIEW OF SYSTEMS:  All other review of systems is negative unless indicated above.      PHYSICAL EXAM:  General: in no acute distress  Eyes: EOMI; conjunctiva and sclera clear, min L upper/lower lid   edema, no erythema or warmth   Head: Normocephalic; atraumatic  ENMT: No nasal discharge; airway clear  Neck: Supple; non tender; no masses  Respiratory: No wheezes, rales or rhonchi  Cardiovascular: Regular rate and rhythm. S1 and S2 Normal;   Gastrointestinal: Soft non-tender non-distended; Normal bowel sounds.  Ostomy in place   Genitourinary: No  suprapubic  tenderness, Murillo in place   Extremities: Min  feet edema, no erythema or warmth,  tenderness to palpation over R heel   and R ankle improved   Neurological: Alert and oriented x3, speech is clear, + paraplegia   Lymph Nodes: + b/l submandibular adenopathy   Musculoskeletal: Decreased  muscle tone LE b/l,  LLE everted   Psychiatric: Cooperative    LABS:                           9.8    12.67 )-----------( 276      ( 12 Feb 2024 05:42 )             30.6     02-12    137  |  105  |  54<H>  ----------------------------<  97  4.0   |  25  |  3.48<H>    Ca    8.0<L>      12 Feb 2024 05:42  Mg     1.7     02-12        Urinalysis Basic - ( 12 Feb 2024 05:42 )  Color: x / Appearance: x / SG: x / pH: x  Gluc: 97 mg/dL / Ketone: x  / Bili: x / Urobili: x   Blood: x / Protein: x / Nitrite: x   Leuk Esterase: x / RBC: x / WBC x   Sq Epi: x / Non Sq Epi: x / Bacteria: x                          9.4    13.39 )-----------( 286      ( 11 Feb 2024 07:42 )             29.2     02-11    137  |  106  |  52<H>  ----------------------------<  108<H>  3.7   |  25  |  3.59<H>    Ca    7.8<L>      11 Feb 2024 07:42  Phos  4.5     02-10  Mg     1.6     02-10      Urinalysis Basic - ( 11 Feb 2024 07:42 )  Color: x / Appearance: x / SG: x / pH: x  Gluc: 108 mg/dL / Ketone: x  / Bili: x / Urobili: x   Blood: x / Protein: x / Nitrite: x   Leuk Esterase: x / RBC: x / WBC x   Sq Epi: x / Non Sq Epi: x / Bacteria: x                              9.2    14.94 )-----------( 240      ( 10 Feb 2024 07:55 )             28.3     10 Feb 2024 07:55    136    |  107    |  57     ----------------------------<  126    3.6     |  20     |  3.93     Ca    8.0        10 Feb 2024 07:55      Urinalysis Basic - ( 10 Feb 2024 07:55 )  Color: x / Appearance: x / SG: x / pH: x  Gluc: 126 mg/dL / Ketone: x  / Bili: x / Urobili: x   Blood: x / Protein: x / Nitrite: x   Leuk Esterase: x / RBC: x / WBC x   Sq Epi: x / Non Sq Epi: x / Bacteria: x    Culture - Urine (02.05.24 @ 17:06)   - Levofloxacin: R 4  - Ceftazidime: S 8  - Levofloxacin: I 2  - Ceftazidime: S 8  - Ciprofloxacin: S 0.5  - Ciprofloxacin: R 2  - Aztreonam: R >16  - Aztreonam: R >16  - Cefepime: I 16  - Cefepime: I 16  - Imipenem: S 2  - Imipenem: S <=1  - Meropenem: S 2  - Piperacillin/Tazobactam: I 32  - Piperacillin/Tazobactam: I 32  - Meropenem: S <=1  Specimen Source: OR Collect None  Culture Results:   >100,000 CFU/ml Pseudomonas aeruginosa   >100,000 CFU/ml Pseudomonas aeruginosa #2   Multiple Morphological Strains  Organism Identification: Pseudomonas aeruginosa   Pseudomonas aeruginosa  Organism: Pseudomonas aeruginosa  Organism: Pseudomonas aeruginosa      MEDICATIONS  (STANDING):  amLODIPine   Tablet 10 milliGRAM(s) Oral daily  aspirin enteric coated 81 milliGRAM(s) Oral daily  atorvastatin 40 milliGRAM(s) Oral at bedtime  dextrose 5%. 1000 milliLiter(s) (50 mL/Hr) IV Continuous <Continuous>  dextrose 5%. 1000 milliLiter(s) (100 mL/Hr) IV Continuous <Continuous>  dextrose 50% Injectable 12.5 Gram(s) IV Push once  dextrose 50% Injectable 25 Gram(s) IV Push once  dextrose 50% Injectable 25 Gram(s) IV Push once  doxycycline IVPB 100 milliGRAM(s) IV Intermittent every 12 hours  glucagon  Injectable 1 milliGRAM(s) IntraMuscular once  heparin   Injectable 5000 Unit(s) SubCutaneous every 8 hours  meropenem Injectable 500 milliGRAM(s) IV Push every 12 hours  sevelamer carbonate 800 milliGRAM(s) Oral three times a day with meals  sodium bicarbonate 650 milliGRAM(s) Oral two times a day    MEDICATIONS  (PRN):  acetaminophen     Tablet .. 650 milliGRAM(s) Oral every 6 hours PRN Mild Pain (1 - 3)  benzonatate 100 milliGRAM(s) Oral every 8 hours PRN Cough  dextrose Oral Gel 15 Gram(s) Oral once PRN Blood Glucose LESS THAN 70 milliGRAM(s)/deciliter  hydrALAZINE Injectable 10 milliGRAM(s) IV Push every 6 hours PRN for systolic BP greater than 160  melatonin 3 milliGRAM(s) Oral at bedtime PRN Sleep  oxyCODONE    IR 5 milliGRAM(s) Oral every 6 hours PRN Moderate Pain (4 - 6)  oxyCODONE    IR 10 milliGRAM(s) Oral every 6 hours PRN Severe Pain (7 - 10)      RADIOLOGY & ADDITIONAL TESTS:      ACC: [id number] EXAM:  US DPLX LWR EXT VEINS LTD RT   ORDERED BY: AVA BOOGIE     PROCEDURE DATE:  02/09/2024          INTERPRETATION:  CLINICAL INFORMATION: Right leg pain. Evaluate for DVT.    COMPARISON: None available.    TECHNIQUE: Duplex sonography of the RIGHT LOWER extremity veins with   color and spectral Doppler, with and without compression.    FINDINGS:    There is normal compressibility of the right common femoral, femoral and   popliteal veins.  The contralateral common femoral vein is patent.  Doppler examination shows normal spontaneous and phasic flow.    No calf vein thrombosis is detected. Please note that the gastrocnemius   and soleal veins are not visualized, precluding assessment.    IMPRESSION:  No evidence of right lower extremity deep venous thrombosis. See   described limitations above.      ACC: [id number] EXAM:  US DPLX CAROTIDS COMPL BI   ORDERED BY: ALFREDO CARLTON     PROCEDURE DATE:  02/06/2024          INTERPRETATION:  CLINICAL INFORMATION: Confirm a high grade carotid   artery stenosis identified on a maxillofacial CT examination, dated   2/4/2024    COMPARISON: None available.    TECHNIQUE: Grayscale, color and spectral Doppler examination of both   carotid arteries was performed.    FINDINGS:    Prominent calcified atheromatous plaques impeding antegrade flow are   present bilaterally in the right and left internal carotid arteries in   the regions of the bifurcations.    Peak systolic velocities are as follows:    RIGHT:  PROX CCA = 94.82  DIST CCA = 105.01  PROX ICA = 263/30  DIST ICA = 136.92  ECA = 318    LEFT:  PROX CCA = 113.88  DIST CCA = 123.20  PROX ICA = 607/160  DIST ICA = 151.21  ECA = 164.87    There is retrograde flow in the right vertebral artery.    There is antegrade flow in the left vertebral artery.    IMPRESSION:    There are high grade, greater than 70% stenoses of both the right and   left internal carotid arteries.    There are flow-limiting stenoses of the right and left external carotid   arteries.    There is retrograde flow in the right vertebral artery.    A dedicated CTA or MRA of the great vessels arising from the arch is   recommended to confirm the high grade stenoses of the right and left   internal carotid arteries and to investigate the retrograde flow in the   right vertebral artery with possible steal phenomenon.    ACC: [id number] EXAM:  US KIDNEYS AND BLADDER   ORDERED BY: ALLI HAYES     PROCEDURE DATE:  02/04/2024          INTERPRETATION:  CLINICAL INFORMATION: Evaluate for hydro-    COMPARISON: 7/30/2018    TECHNIQUE: Sonography of the kidneys and bladder.    FINDINGS:  Right kidney: 13.1 cm. upper pole simple appearing cyst measuring 3.6 x   3.2 x 4.4 cm and severe hydronephrosis    Left kidney: 12.4 cm. severe hydronephrosis    Urinary bladder: Only the right ureteral jets were seen. Prevoid volume   is 1871 cc    IMPRESSION:  Right kidney exophytic cyst appears simple. Bilateral severe   hydronephrosis. Only a right ureteral jet was seen. Large prevoid volume        ACC: [id number] EXAM:  CT MAXILLOFACIAL IC   ORDERED BY: ILSA MIRANDA     PROCEDURE DATE:  02/04/2024          INTERPRETATION:  History: Swelling of the left face, question facial   abscess near the nose.  73-year-old with past medical history for   diabetes, hypertension, polio presents emergency department with left   facial swelling. Patient started with symptoms approximately 3 days ago.   Patient noticed the painful area left nose. Now patient with more   swelling left side of face. Patient did take some amoxicillin at home   which helped but did not take last night and now swelling is worse today.    Description: A postcontrast CT scan of the maxillofacial region was   performed.    No prior maxillofacial imaging study was available for comparison at this   Medical Center.    Axial images with coronal and sagittal reformatted series were obtained.    90 cc intravenous Omnipaque 350 contrast was administered.    Extensive nasal and perinasal soft tissue swelling is noted. Soft tissue   swelling also involves the left preseptal periorbital soft tissues,   without evidence for post septal extension at this time. This most likely   reflects a soft tissue infectious phlegmon-cellulitis, without focal   abscess collection at this time.    Both maxillary sinuses are severely opacified. The secretions demonstrate   increased density on the right, which may reflect chronicity-inspissation   versus chronic fungal sinus disease. The walls of the maxillary sinuses   are mildly thickened suggesting a chronic sinusitis component. Mild   mucosal thickening involves the ethmoid sinuses. The sphenoid sinus is   overall well aerated.    Bilateral nasal bone deformities are noted which may reflect old   fractures (there is no submitted clinicalhistory of recent facial   trauma).    A high-grade stenosis involves the origin of the left internal carotid   artery, likely measuring greater than 80% via NASCET criteria. Calcified   plaque and moderate stenosis involves the origin of the right internal   carotid artery. Correlation with carotid ultrasound is recommended.    Impacted horizontally oriented wisdom teeth involve the bilateral   maxilla-floor of the maxillary sinuses.    IMPRESSION:    Extensive nasal and perinasal soft tissue swelling is noted. Soft tissue   swelling also involves the left preseptal periorbital soft tissues,   without evidence for post septal extension at this time. This most likely   reflects a soft tissue infectious phlegmon-cellulitis, without focal   abscess collection at this time.    Both maxillary sinuses are severely opacified. The secretions demonstrate   increased density on the right, which may reflect chronicity-inspissation   versus chronic fungal sinus disease. The walls of the maxillary sinuses   are mildly thickened suggesting a chronic sinusitis component.      ACC: [id number] EXAM:  CT ABDOMEN AND PELVIS   ORDERED BY: ILSA MIRANDA     PROCEDURE DATE:  02/04/2024          INTERPRETATION:  CLINICAL INFORMATION: Elevated creatinine, urinary   retention    COMPARISON: 7/26/2018    CONTRAST/COMPLICATIONS:  IV Contrast: NONE  Oral Contrast: NONE  Complications: None reported at time of study completion    PROCEDURE:  CT of the Abdomen and Pelvis was performed.  Sagittal and coronal reformats were performed.    FINDINGS:  LOWER CHEST: Within normal limits.    LIVER: Within normal limits.  BILE DUCTS: Normal caliber.  GALLBLADDER: Cholelithiasis.  SPLEEN: Within normal limits.  PANCREAS: Within normal limits.  ADRENALS: Within normal limits.  KIDNEYS/URETERS: Marked bilateral hydronephrosis, as noted previously, to   the level of the bladder, increased in extent since the previous study   with increased distention and dilatation of the ureters. Increased size   of slightly hyperattenuating lesion from the upper pole of the right   kidney, measuring 3.8 cm, compared to 1.6 cm previously. Smaller,   bilateral simple cysts.    BLADDER: Distended with trabeculations  REPRODUCTIVE ORGANS: Enlarged prostate gland with seeds in place    BOWEL: No bowel obstruction.  PERITONEUM: No ascites.  VESSELS: Within normal limits.  RETROPERITONEUM/LYMPH NODES: No lymphadenopathy.  ABDOMINAL WALL: Within normal limits.  BONES: Degenerative changes. Bilateral marked muscular atrophy in the hip   joints.    IMPRESSION:  1.  Marked bilateral hydronephrosis to the level of the bladder,   increased in severity and extent since the previous study.  2.  Increased size of a hyperattenuating exophytic right renal lesion,   possible hyper proteinaceous cyst. Would recommend elective repeat   ultrasound.        ACC: [id number] EXAM:  XR FOOT 2 VIEWS RT   ORDERED BY: AVA BOOGIE     PROCEDURE DATE:  02/09/2024          INTERPRETATION:  INDICATION: Right heel pain and erythema. Rule out   fracture or infection    TECHNIQUE: 3 views of the right foot    COMPARISON: None.    FINDINGS: Surgical hardware is present in the region of the talocalcaneal   joint. There is bony fusion/syndesmosis deformity of most of the   hindfoot/distal tibia and fibula. No fracture is seen. No cortical   irregularity or erosion is seen to suggest osteomyelitis. Vascular   calcifications are noted. No subcutaneous tracking gas is seen.    IMPRESSION: No fracture or radiographic evidence of infection visualized   in the right foot with attention to the right heel.

## 2024-02-12 NOTE — PROGRESS NOTE ADULT - ASSESSMENT
74 yo Male s/p attempted SPT placement on 2/5/24 complicated by bowel injury s/p colostomy placement and urethral mcconnell placement.   UCx with >100,000 CFU/ml Pseudomonas aeruginosa  Discussed options of leaving indwelling mcconnell in place. Patient prefers to have a Suprapubic tube placement and does not wish to have an indwelling urethral catheter long term.   Recommend  - Continue Mcconnell  - Continue Meropenem/antibiotics as per ID  - IR consult appreciated, plan for IR guided suprapubic tube placement prior to discharge.       Case discussed with Dr. Xiao

## 2024-02-12 NOTE — PROGRESS NOTE ADULT - ASSESSMENT
73 M with Hx of DM2, HTN, polio with chronic LE paralysis, CKD IV with baseline Cr around 4, BPH, pulmonary HTN was admitted with L facial swelling and left periorbital cellulitis.  He underwent attempt for suprapubic catheter placement for hydronephrosis and SONDRA/CKD.  Hospital course complicated by intraoperative rectal injury, now status post diverting ostomy.    1. L Periorbital cellulitis, clinically improving   -  clinically on ABXs, S/p Zosyn, now on  Meropenem (day #4) and Doxy (day #7)  -  afebrile, WBC coming down to 13k  -  blood cultures from 2/4 are negative  -  CT reports extensive nasal and perinasal soft tissue swelling. +soft tissue swelling involves L pre-septal periorbital soft tissues without evidence for post septal extension.   -  CT also reports that both maxillary sinuses are severely opacified w/ possible inspissation vs chronic fungal disease + chronic sinusitis  - Pt will need to f/u with ENT outPt     2. High grade Bilateral internal carotid artery stenoses  -  s/p neuro and vascular consults  -  patient has refused MRI/MRA brain  -  continue ASA and statin  -  vascular followup with Dr. Leila Orona upon discharge     3.  SONDRA due to B/L hydronephrosis + Exophytic Renal lesion + Suspected SONDRA on CKD stage IV. Hyperphosphatemia   -  CT reports marked B/L hydronephrosis increased in severity and extent, increased size of a hyperattenuating exophytic right renal lesion 3.8cm  -  s/p  and renal consults  -  s/p attempt for suprapubic catheter placement, complicated by bowel injury requiring procedure to be aborted and now s/p colostomy  -  maintain Murillo in place for now  -  monitor urine output and renal function  -  creatinine improved and stable  -  avoid NSAIDs and nephrotoxic meds  -  continue Sevalemer  -  will eventually need a fistula as he is high risk for progression to ESRD  -  IR recs appreciated  plan to do IR SPC placement prior to discharge home ( On IV Abxs  now)     4. Complicated MDRO  Pseudomonas UTI due to obstructive   uropathy   -  maintain isolation   -  ABXs changed to Meropenem  on 2/8 (day #4)  -  Plan for  5-7 days of IV Meropenem  - Above plan D/w Dr Torrez     5. S/P Acute Rectal Injury, s/p diverting colostomy  -  wound care per colorectal  -  tolerating diet  -  pain control  -  colostomy care and teaching done by Rakan Padilla, RN     6. Hypertension  -  BP  improved  -C/w  Norvasc 10mg daily  - Hydralazine PRN     7. R heel pain   suspect due to pressure injury vs neuropathy vs Muscle spasm  No clinical signs of cellulitis  Off load heels, turn and position Q 3H  bed with air matrass   Will order boot   Start trial of gabapentin   Dilaudid changed to PO Oxy PRN  Tylenol PRN      8. Cholelithiasis  -  asymptomatic, f/u outpatient     9. H/o Type 2 Diabetes Mellitus  HB A1c 4.9   regular diet     10.  Metabolic Acidosis  - due to advanced CKD, stable  -  continue sodium bicarbonate tabs    11. Anemia of Chronic Disease  -  due to advanced CKD, stable, monitor for now    12. Hx of Polio  -  has chronic paraplegia  -  frequent repositioning and out of bed to chair to minimize pressure ulcer injuries      DVT prophylaxis  -  venodynes, ordered SQ Heparin, but patient is reluctant to take it, he has been counseled on the risk of developing a DVT/PE while he is bedbound    Dispo:  OOB to chair, frequent repositioning, requires to stay inpatient to complete IV Meropenem course and will need IR guided SPC placement       73 M with Hx of DM2, HTN, polio with chronic LE paralysis, CKD IV with baseline Cr around 4, BPH, pulmonary HTN was admitted with L facial swelling and left periorbital cellulitis.  He underwent attempt for suprapubic catheter placement for hydronephrosis and SONDRA/CKD.  Hospital course complicated by intraoperative rectal injury, now status post diverting ostomy.    1. L Periorbital cellulitis, clinically improved   -  clinically on ABXs, S/p Zosyn, now on  Meropenem (day #5) and Doxy (day #8)  -  afebrile, WBC coming down to 13k  -  blood cultures from 2/4 are negative  -  CT reports extensive nasal and perinasal soft tissue swelling. +soft tissue swelling involves L pre-septal periorbital soft tissues without evidence for post septal extension.   -  CT also reports that both maxillary sinuses are severely opacified w/ possible inspissation vs chronic fungal disease + chronic sinusitis  - Pt will need to f/u with ENT outPt (  discussed with Pt but hesitant)     2. High grade Bilateral internal carotid artery stenoses  -  s/p neuro and vascular consults  -  patient has refused MRI/MRA brain  -  continue ASA and statin  -  vascular followup with Dr. Leila Orona upon discharge     3.  SONDRA due to B/L hydronephrosis + Exophytic Renal lesion + Suspected SONDRA on CKD stage IV. Hyperphosphatemia   -  CT reports marked B/L hydronephrosis increased in severity and extent, increased size of a hyperattenuating exophytic right renal lesion 3.8cm  -  s/p attempt for suprapubic catheter placement, complicated by bowel injury requiring procedure to be aborted and now s/p colostomy  -  maintain Murillo in place for now  -  monitor urine output and renal function  -  creatinine improved and stable  -  avoid NSAIDs and nephrotoxic meds  -  continue Sevalemer  -  will eventually need a fistula as he is high risk for progression to ESRD  - D/w  IR  plan to do IR SPC placement  on 2/14    4. Complicated MDRO  Pseudomonas UTI due to obstructive   uropathy   -  maintain isolation   -  ABXs changed to Meropenem  on 2/8 (day #5)  -  Plan for  7 days of IV Meropenem    5.  Acute Rectal Injury, s/p diverting colostomy  -  wound care per colorectal  -  tolerating diet  -  pain control  -  colostomy care and teaching done by Rakan Farella, RN     6. Hypertension  -  BP  improved  -C/w  Norvasc 10mg daily  - Hydralazine PRN     7. R heel pain   likely  Muscle spasm, responded to muscle relaxants   No clinical signs of cellulitis  Off load heels, turn and position Q 3H  bed with air matrass   Will order boot   off gabapentin a per Pt is not effective   Dilaudid changed to PO Oxy PRN  Tylenol PRN      8. Cholelithiasis  -  asymptomatic, f/u outpatient     9. H/o Type 2 Diabetes Mellitus  HB A1c 4.9   regular diet     10.  Metabolic Acidosis  - due to advanced CKD, stable  -  continue sodium bicarbonate tabs    11. Anemia of Chronic Disease  -  due to advanced CKD, stable, monitor for now    12. Hx of Polio  -  has chronic paraplegia  -  frequent repositioning and out of bed to chair to minimize pressure ulcer injuries      DVT prophylaxis  -  venodynes, ordered SQ Heparin, but patient is reluctant to take it, he has been counseled on the risk of developing a DVT/PE while he is bedbound    Dispo:  OOB to chair, frequent repositioning,  IV Meropenem, plan for SPC placement on 2/14

## 2024-02-13 LAB
ANION GAP SERPL CALC-SCNC: 3 MMOL/L — LOW (ref 5–17)
BUN SERPL-MCNC: 56 MG/DL — HIGH (ref 7–23)
CALCIUM SERPL-MCNC: 7.8 MG/DL — LOW (ref 8.5–10.1)
CHLORIDE SERPL-SCNC: 106 MMOL/L — SIGNIFICANT CHANGE UP (ref 96–108)
CO2 SERPL-SCNC: 27 MMOL/L — SIGNIFICANT CHANGE UP (ref 22–31)
CREAT SERPL-MCNC: 3.53 MG/DL — HIGH (ref 0.5–1.3)
EGFR: 18 ML/MIN/1.73M2 — LOW
GLUCOSE SERPL-MCNC: 93 MG/DL — SIGNIFICANT CHANGE UP (ref 70–99)
HCT VFR BLD CALC: 30 % — LOW (ref 39–50)
HGB BLD-MCNC: 9.5 G/DL — LOW (ref 13–17)
MCHC RBC-ENTMCNC: 28.6 PG — SIGNIFICANT CHANGE UP (ref 27–34)
MCHC RBC-ENTMCNC: 31.7 GM/DL — LOW (ref 32–36)
MCV RBC AUTO: 90.4 FL — SIGNIFICANT CHANGE UP (ref 80–100)
PLATELET # BLD AUTO: 298 K/UL — SIGNIFICANT CHANGE UP (ref 150–400)
POTASSIUM SERPL-MCNC: 4.1 MMOL/L — SIGNIFICANT CHANGE UP (ref 3.5–5.3)
POTASSIUM SERPL-SCNC: 4.1 MMOL/L — SIGNIFICANT CHANGE UP (ref 3.5–5.3)
RBC # BLD: 3.32 M/UL — LOW (ref 4.2–5.8)
RBC # FLD: 13.1 % — SIGNIFICANT CHANGE UP (ref 10.3–14.5)
SODIUM SERPL-SCNC: 136 MMOL/L — SIGNIFICANT CHANGE UP (ref 135–145)
WBC # BLD: 12.28 K/UL — HIGH (ref 3.8–10.5)
WBC # FLD AUTO: 12.28 K/UL — HIGH (ref 3.8–10.5)

## 2024-02-13 PROCEDURE — 99232 SBSQ HOSP IP/OBS MODERATE 35: CPT

## 2024-02-13 RX ADMIN — OXYCODONE HYDROCHLORIDE 10 MILLIGRAM(S): 5 TABLET ORAL at 21:03

## 2024-02-13 RX ADMIN — Medication 110 MILLIGRAM(S): at 10:34

## 2024-02-13 RX ADMIN — SEVELAMER CARBONATE 800 MILLIGRAM(S): 2400 POWDER, FOR SUSPENSION ORAL at 18:16

## 2024-02-13 RX ADMIN — Medication 650 MILLIGRAM(S): at 21:03

## 2024-02-13 RX ADMIN — MEROPENEM 500 MILLIGRAM(S): 1 INJECTION INTRAVENOUS at 21:02

## 2024-02-13 RX ADMIN — Medication 3 MILLIGRAM(S): at 21:04

## 2024-02-13 RX ADMIN — Medication 81 MILLIGRAM(S): at 10:32

## 2024-02-13 RX ADMIN — SEVELAMER CARBONATE 800 MILLIGRAM(S): 2400 POWDER, FOR SUSPENSION ORAL at 10:32

## 2024-02-13 RX ADMIN — ATORVASTATIN CALCIUM 40 MILLIGRAM(S): 80 TABLET, FILM COATED ORAL at 21:04

## 2024-02-13 RX ADMIN — MEROPENEM 500 MILLIGRAM(S): 1 INJECTION INTRAVENOUS at 10:31

## 2024-02-13 RX ADMIN — Medication 650 MILLIGRAM(S): at 10:32

## 2024-02-13 RX ADMIN — Medication 100 MILLIGRAM(S): at 00:28

## 2024-02-13 RX ADMIN — PREGABALIN 1000 MICROGRAM(S): 225 CAPSULE ORAL at 10:32

## 2024-02-13 RX ADMIN — SEVELAMER CARBONATE 800 MILLIGRAM(S): 2400 POWDER, FOR SUSPENSION ORAL at 13:04

## 2024-02-13 RX ADMIN — AMLODIPINE BESYLATE 10 MILLIGRAM(S): 2.5 TABLET ORAL at 10:32

## 2024-02-13 RX ADMIN — Medication 100 MILLIGRAM(S): at 21:04

## 2024-02-13 RX ADMIN — CYCLOBENZAPRINE HYDROCHLORIDE 10 MILLIGRAM(S): 10 TABLET, FILM COATED ORAL at 21:04

## 2024-02-13 RX ADMIN — Medication 1 MILLIGRAM(S): at 10:32

## 2024-02-13 NOTE — PROGRESS NOTE ADULT - ASSESSMENT
A/P: 73y Male s/p mcconnell placement    Pt going to IR tomorrow for SPT placement  Can d/c mcconnell once SPT placed.  Above discussed with Dr. Xiao

## 2024-02-13 NOTE — PROGRESS NOTE ADULT - SUBJECTIVE AND OBJECTIVE BOX
CC: L facial swelling.     HPI: 74 y/o M w/ PMH of DM2, HTN, polio with paraplegia,  CKD IV, BPH, pulmonary HTN, p/w L facial swelling. Swelling started 3 days PTA  and has gotten progressively worse. C/o pain as well. Patient also has discomfort in his nose. Patient took a dose of amoxicillin outpatient. No  fever, chills, cough, runny nose, sore throat. Patient stated  that he is suppose to have a suprapubic catheter placed soon.     INTERVAL HPI/ OVERNIGHT EVENTS:  Pt was seen and examined  s R heel lower leg pain is better with flexeril. No other complains. Plan for procedure in am discussed.   Vital Signs Last 24 Hrs  T(C): 36.3 (13 Feb 2024 08:29), Max: 36.9 (12 Feb 2024 16:08)  T(F): 97.4 (13 Feb 2024 08:29), Max: 98.4 (12 Feb 2024 16:08)  HR: 84 (13 Feb 2024 08:29) (84 - 86)  BP: 149/72 (13 Feb 2024 08:29) (137/60 - 149/72)  RR: 18 (13 Feb 2024 08:29) (17 - 18)  SpO2: 98% (13 Feb 2024 08:29) (97% - 98%)    Parameters below as of 13 Feb 2024 08:29  Patient On (Oxygen Delivery Method): room air        REVIEW OF SYSTEMS:  All other review of systems is negative unless indicated above.      PHYSICAL EXAM:  General: in no acute distress  Eyes: EOMI; conjunctiva and sclera clear, min L upper/lower lid   edema, no erythema or warmth   Head: Normocephalic; atraumatic  ENMT: No nasal discharge; airway clear  Neck: Supple; non tender; no masses  Respiratory: No wheezes, rales or rhonchi  Cardiovascular: Regular rate and rhythm. S1 and S2 Normal;   Gastrointestinal: Soft non-tender non-distended; Normal bowel sounds.  Ostomy with stool  in place   Genitourinary: No  suprapubic  tenderness, Murillo in place   Extremities: Min  feet edema, no erythema or warmth,  tenderness to palpation over R heel   and R ankle improved   Neurological: Alert and oriented x3, speech is clear, + paraplegia   Musculoskeletal: Decreased  muscle tone LE b/l,  LLE everted   Psychiatric: Cooperative    LABS:                           9.5    12.28 )-----------( 298      ( 13 Feb 2024 08:06 )             30.0     02-13    136  |  106  |  56<H>  ----------------------------<  93  4.1   |  27  |  3.53<H>    Ca    7.8<L>      13 Feb 2024 08:06  Mg     1.7     02-12                          9.8    12.67 )-----------( 276      ( 12 Feb 2024 05:42 )             30.6     02-12    137  |  105  |  54<H>  ----------------------------<  97  4.0   |  25  |  3.48<H>    Ca    8.0<L>      12 Feb 2024 05:42  Mg     1.7     02-12        Urinalysis Basic - ( 12 Feb 2024 05:42 )  Color: x / Appearance: x / SG: x / pH: x  Gluc: 97 mg/dL / Ketone: x  / Bili: x / Urobili: x   Blood: x / Protein: x / Nitrite: x   Leuk Esterase: x / RBC: x / WBC x   Sq Epi: x / Non Sq Epi: x / Bacteria: x                          9.4    13.39 )-----------( 286      ( 11 Feb 2024 07:42 )             29.2     02-11    137  |  106  |  52<H>  ----------------------------<  108<H>  3.7   |  25  |  3.59<H>    Ca    7.8<L>      11 Feb 2024 07:42  Phos  4.5     02-10  Mg     1.6     02-10      Urinalysis Basic - ( 11 Feb 2024 07:42 )  Color: x / Appearance: x / SG: x / pH: x  Gluc: 108 mg/dL / Ketone: x  / Bili: x / Urobili: x   Blood: x / Protein: x / Nitrite: x   Leuk Esterase: x / RBC: x / WBC x   Sq Epi: x / Non Sq Epi: x / Bacteria: x                              9.2    14.94 )-----------( 240      ( 10 Feb 2024 07:55 )             28.3     10 Feb 2024 07:55    136    |  107    |  57     ----------------------------<  126    3.6     |  20     |  3.93     Ca    8.0        10 Feb 2024 07:55      Urinalysis Basic - ( 10 Feb 2024 07:55 )  Color: x / Appearance: x / SG: x / pH: x  Gluc: 126 mg/dL / Ketone: x  / Bili: x / Urobili: x   Blood: x / Protein: x / Nitrite: x   Leuk Esterase: x / RBC: x / WBC x   Sq Epi: x / Non Sq Epi: x / Bacteria: x    Culture - Urine (02.05.24 @ 17:06)   - Levofloxacin: R 4  - Ceftazidime: S 8  - Levofloxacin: I 2  - Ceftazidime: S 8  - Ciprofloxacin: S 0.5  - Ciprofloxacin: R 2  - Aztreonam: R >16  - Aztreonam: R >16  - Cefepime: I 16  - Cefepime: I 16  - Imipenem: S 2  - Imipenem: S <=1  - Meropenem: S 2  - Piperacillin/Tazobactam: I 32  - Piperacillin/Tazobactam: I 32  - Meropenem: S <=1  Specimen Source: OR Collect None  Culture Results:   >100,000 CFU/ml Pseudomonas aeruginosa   >100,000 CFU/ml Pseudomonas aeruginosa #2   Multiple Morphological Strains  Organism Identification: Pseudomonas aeruginosa   Pseudomonas aeruginosa  Organism: Pseudomonas aeruginosa  Organism: Pseudomonas aeruginosa      MEDICATIONS  (STANDING):  amLODIPine   Tablet 10 milliGRAM(s) Oral daily  aspirin enteric coated 81 milliGRAM(s) Oral daily  atorvastatin 40 milliGRAM(s) Oral at bedtime  dextrose 5%. 1000 milliLiter(s) (50 mL/Hr) IV Continuous <Continuous>  dextrose 5%. 1000 milliLiter(s) (100 mL/Hr) IV Continuous <Continuous>  dextrose 50% Injectable 12.5 Gram(s) IV Push once  dextrose 50% Injectable 25 Gram(s) IV Push once  dextrose 50% Injectable 25 Gram(s) IV Push once  doxycycline IVPB 100 milliGRAM(s) IV Intermittent every 12 hours  glucagon  Injectable 1 milliGRAM(s) IntraMuscular once  heparin   Injectable 5000 Unit(s) SubCutaneous every 8 hours  meropenem Injectable 500 milliGRAM(s) IV Push every 12 hours  sevelamer carbonate 800 milliGRAM(s) Oral three times a day with meals  sodium bicarbonate 650 milliGRAM(s) Oral two times a day    MEDICATIONS  (PRN):  acetaminophen     Tablet .. 650 milliGRAM(s) Oral every 6 hours PRN Mild Pain (1 - 3)  benzonatate 100 milliGRAM(s) Oral every 8 hours PRN Cough  dextrose Oral Gel 15 Gram(s) Oral once PRN Blood Glucose LESS THAN 70 milliGRAM(s)/deciliter  hydrALAZINE Injectable 10 milliGRAM(s) IV Push every 6 hours PRN for systolic BP greater than 160  melatonin 3 milliGRAM(s) Oral at bedtime PRN Sleep  oxyCODONE    IR 5 milliGRAM(s) Oral every 6 hours PRN Moderate Pain (4 - 6)  oxyCODONE    IR 10 milliGRAM(s) Oral every 6 hours PRN Severe Pain (7 - 10)      RADIOLOGY & ADDITIONAL TESTS:      ACC: [id number] EXAM:  US DPLX LWR EXT VEINS LTD RT   ORDERED BY: AVA BOOGIE     PROCEDURE DATE:  02/09/2024          INTERPRETATION:  CLINICAL INFORMATION: Right leg pain. Evaluate for DVT.    COMPARISON: None available.    TECHNIQUE: Duplex sonography of the RIGHT LOWER extremity veins with   color and spectral Doppler, with and without compression.    FINDINGS:    There is normal compressibility of the right common femoral, femoral and   popliteal veins.  The contralateral common femoral vein is patent.  Doppler examination shows normal spontaneous and phasic flow.    No calf vein thrombosis is detected. Please note that the gastrocnemius   and soleal veins are not visualized, precluding assessment.    IMPRESSION:  No evidence of right lower extremity deep venous thrombosis. See   described limitations above.      ACC: [id number] EXAM:  US DPLX CAROTIDS COMPL BI   ORDERED BY: ALFREDO CARLTON     PROCEDURE DATE:  02/06/2024          INTERPRETATION:  CLINICAL INFORMATION: Confirm a high grade carotid   artery stenosis identified on a maxillofacial CT examination, dated   2/4/2024    COMPARISON: None available.    TECHNIQUE: Grayscale, color and spectral Doppler examination of both   carotid arteries was performed.    FINDINGS:    Prominent calcified atheromatous plaques impeding antegrade flow are   present bilaterally in the right and left internal carotid arteries in   the regions of the bifurcations.    Peak systolic velocities are as follows:    RIGHT:  PROX CCA = 94.82  DIST CCA = 105.01  PROX ICA = 263/30  DIST ICA = 136.92  ECA = 318    LEFT:  PROX CCA = 113.88  DIST CCA = 123.20  PROX ICA = 607/160  DIST ICA = 151.21  ECA = 164.87    There is retrograde flow in the right vertebral artery.    There is antegrade flow in the left vertebral artery.    IMPRESSION:    There are high grade, greater than 70% stenoses of both the right and   left internal carotid arteries.    There are flow-limiting stenoses of the right and left external carotid   arteries.    There is retrograde flow in the right vertebral artery.    A dedicated CTA or MRA of the great vessels arising from the arch is   recommended to confirm the high grade stenoses of the right and left   internal carotid arteries and to investigate the retrograde flow in the   right vertebral artery with possible steal phenomenon.    ACC: [id number] EXAM:  US KIDNEYS AND BLADDER   ORDERED BY: ALLI HAYES     PROCEDURE DATE:  02/04/2024          INTERPRETATION:  CLINICAL INFORMATION: Evaluate for hydro-    COMPARISON: 7/30/2018    TECHNIQUE: Sonography of the kidneys and bladder.    FINDINGS:  Right kidney: 13.1 cm. upper pole simple appearing cyst measuring 3.6 x   3.2 x 4.4 cm and severe hydronephrosis    Left kidney: 12.4 cm. severe hydronephrosis    Urinary bladder: Only the right ureteral jets were seen. Prevoid volume   is 1871 cc    IMPRESSION:  Right kidney exophytic cyst appears simple. Bilateral severe   hydronephrosis. Only a right ureteral jet was seen. Large prevoid volume        ACC: [id number] EXAM:  CT MAXILLOFACIAL IC   ORDERED BY: ILSA MIRANDA     PROCEDURE DATE:  02/04/2024          INTERPRETATION:  History: Swelling of the left face, question facial   abscess near the nose.  73-year-old with past medical history for   diabetes, hypertension, polio presents emergency department with left   facial swelling. Patient started with symptoms approximately 3 days ago.   Patient noticed the painful area left nose. Now patient with more   swelling left side of face. Patient did take some amoxicillin at home   which helped but did not take last night and now swelling is worse today.    Description: A postcontrast CT scan of the maxillofacial region was   performed.    No prior maxillofacial imaging study was available for comparison at this   Medical Center.    Axial images with coronal and sagittal reformatted series were obtained.    90 cc intravenous Omnipaque 350 contrast was administered.    Extensive nasal and perinasal soft tissue swelling is noted. Soft tissue   swelling also involves the left preseptal periorbital soft tissues,   without evidence for post septal extension at this time. This most likely   reflects a soft tissue infectious phlegmon-cellulitis, without focal   abscess collection at this time.    Both maxillary sinuses are severely opacified. The secretions demonstrate   increased density on the right, which may reflect chronicity-inspissation   versus chronic fungal sinus disease. The walls of the maxillary sinuses   are mildly thickened suggesting a chronic sinusitis component. Mild   mucosal thickening involves the ethmoid sinuses. The sphenoid sinus is   overall well aerated.    Bilateral nasal bone deformities are noted which may reflect old   fractures (there is no submitted clinicalhistory of recent facial   trauma).    A high-grade stenosis involves the origin of the left internal carotid   artery, likely measuring greater than 80% via NASCET criteria. Calcified   plaque and moderate stenosis involves the origin of the right internal   carotid artery. Correlation with carotid ultrasound is recommended.    Impacted horizontally oriented wisdom teeth involve the bilateral   maxilla-floor of the maxillary sinuses.    IMPRESSION:    Extensive nasal and perinasal soft tissue swelling is noted. Soft tissue   swelling also involves the left preseptal periorbital soft tissues,   without evidence for post septal extension at this time. This most likely   reflects a soft tissue infectious phlegmon-cellulitis, without focal   abscess collection at this time.    Both maxillary sinuses are severely opacified. The secretions demonstrate   increased density on the right, which may reflect chronicity-inspissation   versus chronic fungal sinus disease. The walls of the maxillary sinuses   are mildly thickened suggesting a chronic sinusitis component.      ACC: [id number] EXAM:  CT ABDOMEN AND PELVIS   ORDERED BY: ILSA MIRANDA     PROCEDURE DATE:  02/04/2024          INTERPRETATION:  CLINICAL INFORMATION: Elevated creatinine, urinary   retention    COMPARISON: 7/26/2018    CONTRAST/COMPLICATIONS:  IV Contrast: NONE  Oral Contrast: NONE  Complications: None reported at time of study completion    PROCEDURE:  CT of the Abdomen and Pelvis was performed.  Sagittal and coronal reformats were performed.    FINDINGS:  LOWER CHEST: Within normal limits.    LIVER: Within normal limits.  BILE DUCTS: Normal caliber.  GALLBLADDER: Cholelithiasis.  SPLEEN: Within normal limits.  PANCREAS: Within normal limits.  ADRENALS: Within normal limits.  KIDNEYS/URETERS: Marked bilateral hydronephrosis, as noted previously, to   the level of the bladder, increased in extent since the previous study   with increased distention and dilatation of the ureters. Increased size   of slightly hyperattenuating lesion from the upper pole of the right   kidney, measuring 3.8 cm, compared to 1.6 cm previously. Smaller,   bilateral simple cysts.    BLADDER: Distended with trabeculations  REPRODUCTIVE ORGANS: Enlarged prostate gland with seeds in place    BOWEL: No bowel obstruction.  PERITONEUM: No ascites.  VESSELS: Within normal limits.  RETROPERITONEUM/LYMPH NODES: No lymphadenopathy.  ABDOMINAL WALL: Within normal limits.  BONES: Degenerative changes. Bilateral marked muscular atrophy in the hip   joints.    IMPRESSION:  1.  Marked bilateral hydronephrosis to the level of the bladder,   increased in severity and extent since the previous study.  2.  Increased size of a hyperattenuating exophytic right renal lesion,   possible hyper proteinaceous cyst. Would recommend elective repeat   ultrasound.        ACC: [id number] EXAM:  XR FOOT 2 VIEWS RT   ORDERED BY: AVA BOOGIE     PROCEDURE DATE:  02/09/2024          INTERPRETATION:  INDICATION: Right heel pain and erythema. Rule out   fracture or infection    TECHNIQUE: 3 views of the right foot    COMPARISON: None.    FINDINGS: Surgical hardware is present in the region of the talocalcaneal   joint. There is bony fusion/syndesmosis deformity of most of the   hindfoot/distal tibia and fibula. No fracture is seen. No cortical   irregularity or erosion is seen to suggest osteomyelitis. Vascular   calcifications are noted. No subcutaneous tracking gas is seen.    IMPRESSION: No fracture or radiographic evidence of infection visualized   in the right foot with attention to the right heel.

## 2024-02-13 NOTE — PROGRESS NOTE ADULT - ASSESSMENT
73 M with Hx of DM2, HTN, polio with chronic LE paralysis, CKD IV with baseline Cr around 4, BPH, pulmonary HTN was admitted with L facial swelling and left periorbital cellulitis.  He underwent attempt for suprapubic catheter placement for hydronephrosis and SONDRA/CKD.  Hospital course complicated by intraoperative rectal injury, now status post diverting ostomy.    1. L Periorbital cellulitis, clinically improved   -  clinically on ABXs, S/p Zosyn, now on  Meropenem (day #6) and Doxy (day #9), change to PO Doxy  -  afebrile, WBC coming down   -  blood cultures from 2/4 are negative  -  CT reports extensive nasal and perinasal soft tissue swelling. +soft tissue swelling involves L pre-septal periorbital soft tissues without evidence for post septal extension.   -  CT also reports that both maxillary sinuses are severely opacified w/ possible inspissation vs chronic fungal disease + chronic sinusitis  - Pt will need to f/u with ENT outPt (  discussed with Pt but hesitant)     2. High grade Bilateral internal carotid artery stenoses  -  s/p neuro and vascular consults  -  patient has refused MRI/MRA brain  -  continue ASA and statin  -  vascular followup with Dr. Leila Orona upon discharge     3.  SONDRA due to B/L hydronephrosis + Exophytic Renal lesion + Suspected SONDRA on CKD stage IV. Hyperphosphatemia   -  CT reports marked B/L hydronephrosis increased in severity and extent, increased size of a hyperattenuating exophytic right renal lesion 3.8cm  -  s/p attempt for suprapubic catheter placement, complicated by bowel injury requiring procedure to be aborted and now s/p colostomy  -  maintain Murillo in place for now  -  monitor urine output and renal function  -  creatinine improved and stable  -  avoid NSAIDs and nephrotoxic meds  -  continue Sevalemer  -  will eventually need a fistula as he is high risk for progression to ESRD  -  NPO for   SPC placement  on 2/14    4. Complicated MDRO  Pseudomonas UTI due to obstructive   uropathy   -  maintain isolation   -  ABXs changed to Meropenem  on 2/8 (day #6)  -  Plan for  7 days of IV Meropenem    5.  Acute Rectal Injury, s/p diverting colostomy  -  wound care per colorectal  -  tolerating diet  -  pain control  -  colostomy care and teaching done by Rakan Farella, RN     6. Hypertension  -  BP  improved  -C/w  Norvasc 10mg daily  - Hydralazine PRN     7. R heel pain   likely  Muscle spasm, responded to muscle relaxants   No clinical signs of cellulitis  Off load heels, turn and position Q 3H  bed with air matrass   did not tolerate  boot   off gabapentin a per Pt is not effective   Dilaudid changed to PO Oxy PRN  Tylenol PRN      8. Cholelithiasis  -  asymptomatic, f/u outpatient     9. H/o Type 2 Diabetes Mellitus  HB A1c 4.9   regular diet     10.  Metabolic Acidosis  - due to advanced CKD, stable  -  continue sodium bicarbonate tabs    11. Anemia of Chronic Disease  -  due to advanced CKD, stable, monitor for now    12. Hx of Polio  -  has chronic paraplegia  -  frequent repositioning and out of bed to chair to minimize pressure ulcer injuries      DVT prophylaxis  -  venodynes, ordered SQ Heparin, but patient is reluctant to take it, he has been counseled on the risk of developing a DVT/PE while he is bedbound    Dispo:  NPO after midnight for SPC placement in am with IR

## 2024-02-13 NOTE — ADVANCED PRACTICE NURSE CONSULT - RECOMMEDATIONS
Pouch change: 	  -Gently remove pouch water moistened gauze   -Cleanse stoma site with water moistened gauze, gently pat dry  -Measure stoma, currently 1"  -Trace and cut current size on [place] 2 1/4” CeraPlus flat barrier  -Stoma Paste to lower portion of the peristomal skin (dimpled area)    -Apply barrier to patient's skin  -Attach [place] 2 1/4” drainable lock and roll pouch onto barrier  Empty pouch when 1/3 to no more than 1/2 full of effluent/flatus. Change pouching system q 3 - 4 days and prn for leaking. Next pouch change- 2/10/2024  
-HoverMAT with boosting and transferring to reduce friction   -Maintain Low Air Loss Mattress    -Continue turning/positioning patient from side-to-side q2h while in bed, q1h when/if OOB chair, or in accordance w/ pt's plan of care. Utilize pillows and/or Spry positioner pillow to assist w/ turning/positioning. When/if OOB chair, utilize pillows or chair cushion to offload pressure.   -Continue to offload heels from bed surface with soft pillow under calfs or by applying offloading boots to BLEs.   -Continue utilizing one underpad underneath patient to contain incontinence episodes; change pad when saturated/soiled. (patient preferring no underpad)   -Continue nutrition consult for optimal wound healing & nutritional status.   -Assess skin/wound qshift, report changes to primary provider.     Upper Intergluteal cleft  -Gently clean with Saline to PH perispray   -Apply triad cream   daily and prn     Plan of Care: Primary RN made aware of above recs. No further needs/recs from ON service at this time. Staff RN to perform routine skin/wound assessment and manage wound care. Questions or concerns or if wound worsens and reconsult needed, please contact ON

## 2024-02-13 NOTE — PROGRESS NOTE ADULT - SUBJECTIVE AND OBJECTIVE BOX
Pt resting in bed. Rohith diet, no complaints. Murillo draining    Vital Signs Last 24 Hrs  T(C): 36.3 (13 Feb 2024 08:29), Max: 36.9 (12 Feb 2024 16:08)  T(F): 97.4 (13 Feb 2024 08:29), Max: 98.4 (12 Feb 2024 16:08)  HR: 84 (13 Feb 2024 08:29) (84 - 86)  BP: 149/72 (13 Feb 2024 08:29) (137/60 - 149/72)  BP(mean): --  RR: 18 (13 Feb 2024 08:29) (17 - 18)  SpO2: 98% (13 Feb 2024 08:29) (97% - 98%)    Parameters below as of 13 Feb 2024 08:29  Patient On (Oxygen Delivery Method): room air        I&O's Summary    12 Feb 2024 07:01  -  13 Feb 2024 07:00  --------------------------------------------------------  IN: 0 mL / OUT: 1500 mL / NET: -1500 mL        Physical Exam  Gen: NAD, A&Ox3  Abd: Soft, NT, ND, no bladder palp  : Murillo in place draining yellow urine                          9.5    12.28 )-----------( 298      ( 13 Feb 2024 08:06 )             30.0       02-13    136  |  106  |  56<H>  ----------------------------<  93  4.1   |  27  |  3.53<H>    Ca    7.8<L>      13 Feb 2024 08:06  Mg     1.7     02-12

## 2024-02-13 NOTE — ADVANCED PRACTICE NURSE CONSULT - ASSESSMENT
This is a 73 year old Male and per MD H&P " w/ PMH of DM2, HTN, polio, CKD IV, BPH, pulmonary HTN, p/w L facial swelling. Swelling started 3 days ago, and has gotten progressively worse. C/o pain as well. Patient also has discomfort in his nose. Patient took a dose of amoxicillin outpatient. Denies fever, chills, cough, runny nose, sore throat. Also c/p pain in L side of nose. Patient states that he is suppose to have a suprapubic catheter placed soon. Patient states he has urinated since coming to ED    Assessed patient on [address] [place] [address]  Patient is on a [employer] Low Air loss mattress   Indwelling urinary catheter in place and draining yellow urine  loop Colostomy Functioning and wife doing a wonderful job maintaining and changing wafer and pouching system     Upper inner gluteal cleft noted a 1.2cm x 2.2cm x 0.1cm partial thickness skin loss. Periwound with hyperpigmentation.  patient and wife reported that he has had sacral injury in the past and per patient "i feel pain in my bone at times". Patients wife reported that she found a "cap" in this area. I personally did not see this. The area of partial thickness is not directly over the bone. With the area and the position of the irritation or Kissing lesion from the bilateral inner gluteal cleft unknown if this irritation is related to the "cap"  as it does have the characteristics of moisture and friction. When assessing, cleansed area with saline and then reapplied Triad cream. Patient reporting that he feels more comfortable without a absorbant pad under him. i did educate to move side to side for pressure redistribution at least every two hours but preferably every one hour when awake. Patient did report that he does shift from side-to-side throughout the day.   Wife is at bedside and assists with patient care.     
 Assessment:    Received patient sitting up in bed, awake, with two daughter and wife at bedside,  made aware of purpose of CWON, agreeable to pouch change & ostomy teaching. Loop Colostomy w/ You drainable pouching system applied  in place, barrier intact, no leakage. Pouch gently removed from pt's abdomen w/ water moistened gauze.     Type of ostomy: Loop Colostomy   Location: RUQ  Joseph:NA  Size: Round 1" and flush to skin dimpled in and Os of stoma pointing down   Mucosa: red, moist, remains edematous   Peristomal skin: Inferior Peristomal skin dimpled    Mucocutaneous junction: intact  Abdominal contours: Rounded and firm   Output: ~Soft bowel movement noted in removed pouch  Midline/lap sites/ drains: scattered abdominal lap,      Patient re-pouched w/ Hugoton 2 1/4” CeraPlus flat barrier (cut to 1" ), and Hugoton  2 1/4” drainable pouch w/ lock & roll closure.   Full ostomy lesson provided to patient.    Impression:   Loop Colostomy given pt's stomal characteristics and abdominal topography, pt still requires flat pouching system to protect peristomal skin w/ shrinking post-op stomal size. With Firmly rounded abdomen and 1" stomal size with dimpling, Recommend continuing with Flat wafer and apply Stoma paste to inferior peristomal skin. Another option can be a soft convex if dimpling doesn't improve. At this time i do not recommend a firm convex because of abdominal roundness and firm.     Patient wife will be primary caretaker and at bedside with hands on education and teach back. Wife is willing and ready to learn ostomy care this visit, observed entire pouch change, looking directly at stoma, asking  appropriate questions & able to verbalize key ostomy points. Patient, wife and daughters again observed pouch opening/closing, again reviewed w/ patient that his stool consistency will require pouch emptying & again strongly encouraged to start practicing pouch opening/closing & emptying as this skill is required prior to d/c home, patient agreeable to start practicing pouch emptying. Patient expressed feeling overwhelmed with everything,  much emotional support & encouragement provided to patient and family     Reviewed with patient dietary restrictions, s/s & prevention of food obstruction & constipation. Also reviewed w/ patient lifestyle modifications (clothing, bathing/showering, physical activity). Written information regarding all above topics provided to patient.  Provided Azeri information teaching to wife.     Patient enrolled in [facility] and will require a 2 1/4 flat wafer and pouch system

## 2024-02-14 LAB
ANION GAP SERPL CALC-SCNC: 5 MMOL/L — SIGNIFICANT CHANGE UP (ref 5–17)
BUN SERPL-MCNC: 62 MG/DL — HIGH (ref 7–23)
CALCIUM SERPL-MCNC: 8 MG/DL — LOW (ref 8.5–10.1)
CHLORIDE SERPL-SCNC: 105 MMOL/L — SIGNIFICANT CHANGE UP (ref 96–108)
CO2 SERPL-SCNC: 27 MMOL/L — SIGNIFICANT CHANGE UP (ref 22–31)
CREAT SERPL-MCNC: 3.4 MG/DL — HIGH (ref 0.5–1.3)
EGFR: 18 ML/MIN/1.73M2 — LOW
GLUCOSE SERPL-MCNC: 85 MG/DL — SIGNIFICANT CHANGE UP (ref 70–99)
HCT VFR BLD CALC: 29.4 % — LOW (ref 39–50)
HGB BLD-MCNC: 9.4 G/DL — LOW (ref 13–17)
MCHC RBC-ENTMCNC: 28.8 PG — SIGNIFICANT CHANGE UP (ref 27–34)
MCHC RBC-ENTMCNC: 32 GM/DL — SIGNIFICANT CHANGE UP (ref 32–36)
MCV RBC AUTO: 90.2 FL — SIGNIFICANT CHANGE UP (ref 80–100)
PLATELET # BLD AUTO: 284 K/UL — SIGNIFICANT CHANGE UP (ref 150–400)
POTASSIUM SERPL-MCNC: 4.2 MMOL/L — SIGNIFICANT CHANGE UP (ref 3.5–5.3)
POTASSIUM SERPL-SCNC: 4.2 MMOL/L — SIGNIFICANT CHANGE UP (ref 3.5–5.3)
RBC # BLD: 3.26 M/UL — LOW (ref 4.2–5.8)
RBC # FLD: 12.7 % — SIGNIFICANT CHANGE UP (ref 10.3–14.5)
SODIUM SERPL-SCNC: 137 MMOL/L — SIGNIFICANT CHANGE UP (ref 135–145)
WBC # BLD: 11.84 K/UL — HIGH (ref 3.8–10.5)
WBC # FLD AUTO: 11.84 K/UL — HIGH (ref 3.8–10.5)

## 2024-02-14 PROCEDURE — 77012 CT SCAN FOR NEEDLE BIOPSY: CPT | Mod: 26

## 2024-02-14 PROCEDURE — 51102 DRAIN BL W/CATH INSERTION: CPT

## 2024-02-14 PROCEDURE — 99232 SBSQ HOSP IP/OBS MODERATE 35: CPT

## 2024-02-14 RX ORDER — SODIUM CHLORIDE 9 MG/ML
1000 INJECTION, SOLUTION INTRAVENOUS
Refills: 0 | Status: DISCONTINUED | OUTPATIENT
Start: 2024-02-14 | End: 2024-02-15

## 2024-02-14 RX ORDER — OXYCODONE HYDROCHLORIDE 5 MG/1
5 TABLET ORAL ONCE
Refills: 0 | Status: DISCONTINUED | OUTPATIENT
Start: 2024-02-14 | End: 2024-02-14

## 2024-02-14 RX ORDER — FENTANYL CITRATE 50 UG/ML
50 INJECTION INTRAVENOUS
Refills: 0 | Status: DISCONTINUED | OUTPATIENT
Start: 2024-02-14 | End: 2024-02-14

## 2024-02-14 RX ORDER — ONDANSETRON 8 MG/1
4 TABLET, FILM COATED ORAL ONCE
Refills: 0 | Status: DISCONTINUED | OUTPATIENT
Start: 2024-02-14 | End: 2024-02-22

## 2024-02-14 RX ADMIN — MEROPENEM 500 MILLIGRAM(S): 1 INJECTION INTRAVENOUS at 22:30

## 2024-02-14 RX ADMIN — Medication 3 MILLIGRAM(S): at 22:31

## 2024-02-14 RX ADMIN — OXYCODONE HYDROCHLORIDE 10 MILLIGRAM(S): 5 TABLET ORAL at 20:44

## 2024-02-14 RX ADMIN — PREGABALIN 1000 MICROGRAM(S): 225 CAPSULE ORAL at 22:31

## 2024-02-14 RX ADMIN — Medication 100 MILLIGRAM(S): at 22:31

## 2024-02-14 RX ADMIN — SEVELAMER CARBONATE 800 MILLIGRAM(S): 2400 POWDER, FOR SUSPENSION ORAL at 17:24

## 2024-02-14 RX ADMIN — CYCLOBENZAPRINE HYDROCHLORIDE 10 MILLIGRAM(S): 10 TABLET, FILM COATED ORAL at 22:31

## 2024-02-14 RX ADMIN — OXYCODONE HYDROCHLORIDE 10 MILLIGRAM(S): 5 TABLET ORAL at 21:45

## 2024-02-14 RX ADMIN — ATORVASTATIN CALCIUM 40 MILLIGRAM(S): 80 TABLET, FILM COATED ORAL at 22:31

## 2024-02-14 RX ADMIN — Medication 1 MILLIGRAM(S): at 22:31

## 2024-02-14 RX ADMIN — AMLODIPINE BESYLATE 10 MILLIGRAM(S): 2.5 TABLET ORAL at 22:31

## 2024-02-14 RX ADMIN — Medication 81 MILLIGRAM(S): at 22:30

## 2024-02-14 RX ADMIN — Medication 650 MILLIGRAM(S): at 22:30

## 2024-02-14 NOTE — PROGRESS NOTE ADULT - SUBJECTIVE AND OBJECTIVE BOX
CC: L facial swelling.     HPI: 72 y/o M w/ PMH of DM2, HTN, polio with paraplegia,  CKD IV, BPH, pulmonary HTN, p/w L facial swelling. Swelling started 3 days PTA  and has gotten progressively worse. C/o pain as well. Patient also has discomfort in his nose. Patient took a dose of amoxicillin outpatient. No  fever, chills, cough, runny nose, sore throat. Patient stated  that he is suppose to have a suprapubic catheter placed soon.     INTERVAL HPI/ OVERNIGHT EVENTS:  Pt was seen and examined  s R heel lower leg pain is better with flexeril. No other complains. Plan for procedure in am discussed.   02/14/24: No new issues today. Waiting for IR for STP placement. Discussed with patient and wife regarding management plan.       Vital Signs Last 24 Hrs  T(C): 36.6 (14 Feb 2024 08:25), Max: 37.1 (13 Feb 2024 16:42)  T(F): 97.8 (14 Feb 2024 08:25), Max: 98.8 (13 Feb 2024 16:42)  HR: 83 (14 Feb 2024 08:25) (83 - 86)  BP: 147/68 (14 Feb 2024 08:25) (137/66 - 147/69)  BP(mean): --  RR: 18 (14 Feb 2024 08:25) (18 - 18)  SpO2: 97% (14 Feb 2024 08:25) (96% - 97%)    Parameters below as of 14 Feb 2024 08:25  Patient On (Oxygen Delivery Method): room air        REVIEW OF SYSTEMS:  All other review of systems is negative unless indicated above.      PHYSICAL EXAM:  General: in no acute distress  Eyes: EOMI; conjunctiva and sclera clear, min L upper/lower lid   edema, no erythema or warmth   Head: Normocephalic; atraumatic  ENMT: No nasal discharge; airway clear  Neck: Supple; non tender; no masses  Respiratory: No wheezes, rales or rhonchi  Cardiovascular: Regular rate and rhythm. S1 and S2 Normal;   Gastrointestinal: Soft non-tender non-distended; Normal bowel sounds.  Ostomy with stool  in place   Genitourinary: No  suprapubic  tenderness, Murillo in place   Extremities: Min  feet edema, no erythema or warmth,  tenderness to palpation over R heel   and R ankle improved   Neurological: Alert and oriented x3, speech is clear, + paraplegia   Musculoskeletal: Decreased  muscle tone LE b/l,  LLE everted   Psychiatric: Cooperative        LABS:                                               9.4    11.84 )-----------( 284      ( 14 Feb 2024 07:27 )             29.4     14 Feb 2024 07:27    137    |  105    |  62     ----------------------------<  85     4.2     |  27     |  3.40     Ca    8.0        14 Feb 2024 07:27          CAPILLARY BLOOD GLUCOSE            Urinalysis Basic - ( 14 Feb 2024 07:27 )    Color: x / Appearance: x / SG: x / pH: x  Gluc: 85 mg/dL / Ketone: x  / Bili: x / Urobili: x   Blood: x / Protein: x / Nitrite: x   Leuk Esterase: x / RBC: x / WBC x   Sq Epi: x / Non Sq Epi: x / Bacteria: x            Urinalysis Basic - ( 12 Feb 2024 05:42 )  Color: x / Appearance: x / SG: x / pH: x  Gluc: 97 mg/dL / Ketone: x  / Bili: x / Urobili: x   Blood: x / Protein: x / Nitrite: x   Leuk Esterase: x / RBC: x / WBC x   Sq Epi: x / Non Sq Epi: x / Bacteria: x                          9.4    13.39 )-----------( 286      ( 11 Feb 2024 07:42 )             29.2     02-11    137  |  106  |  52<H>  ----------------------------<  108<H>  3.7   |  25  |  3.59<H>    Ca    7.8<L>      11 Feb 2024 07:42  Phos  4.5     02-10  Mg     1.6     02-10      Urinalysis Basic - ( 11 Feb 2024 07:42 )  Color: x / Appearance: x / SG: x / pH: x  Gluc: 108 mg/dL / Ketone: x  / Bili: x / Urobili: x   Blood: x / Protein: x / Nitrite: x   Leuk Esterase: x / RBC: x / WBC x   Sq Epi: x / Non Sq Epi: x / Bacteria: x                              9.2    14.94 )-----------( 240      ( 10 Feb 2024 07:55 )             28.3     10 Feb 2024 07:55    136    |  107    |  57     ----------------------------<  126    3.6     |  20     |  3.93     Ca    8.0        10 Feb 2024 07:55      Urinalysis Basic - ( 10 Feb 2024 07:55 )  Color: x / Appearance: x / SG: x / pH: x  Gluc: 126 mg/dL / Ketone: x  / Bili: x / Urobili: x   Blood: x / Protein: x / Nitrite: x   Leuk Esterase: x / RBC: x / WBC x   Sq Epi: x / Non Sq Epi: x / Bacteria: x    Culture - Urine (02.05.24 @ 17:06)   - Levofloxacin: R 4  - Ceftazidime: S 8  - Levofloxacin: I 2  - Ceftazidime: S 8  - Ciprofloxacin: S 0.5  - Ciprofloxacin: R 2  - Aztreonam: R >16  - Aztreonam: R >16  - Cefepime: I 16  - Cefepime: I 16  - Imipenem: S 2  - Imipenem: S <=1  - Meropenem: S 2  - Piperacillin/Tazobactam: I 32  - Piperacillin/Tazobactam: I 32  - Meropenem: S <=1  Specimen Source: OR Collect None  Culture Results:   >100,000 CFU/ml Pseudomonas aeruginosa   >100,000 CFU/ml Pseudomonas aeruginosa #2   Multiple Morphological Strains  Organism Identification: Pseudomonas aeruginosa   Pseudomonas aeruginosa  Organism: Pseudomonas aeruginosa  Organism: Pseudomonas aeruginosa      MEDICATIONS  (STANDING):  amLODIPine   Tablet 10 milliGRAM(s) Oral daily  aspirin enteric coated 81 milliGRAM(s) Oral daily  atorvastatin 40 milliGRAM(s) Oral at bedtime  dextrose 5%. 1000 milliLiter(s) (50 mL/Hr) IV Continuous <Continuous>  dextrose 5%. 1000 milliLiter(s) (100 mL/Hr) IV Continuous <Continuous>  dextrose 50% Injectable 12.5 Gram(s) IV Push once  dextrose 50% Injectable 25 Gram(s) IV Push once  dextrose 50% Injectable 25 Gram(s) IV Push once  doxycycline IVPB 100 milliGRAM(s) IV Intermittent every 12 hours  glucagon  Injectable 1 milliGRAM(s) IntraMuscular once  heparin   Injectable 5000 Unit(s) SubCutaneous every 8 hours  meropenem Injectable 500 milliGRAM(s) IV Push every 12 hours  sevelamer carbonate 800 milliGRAM(s) Oral three times a day with meals  sodium bicarbonate 650 milliGRAM(s) Oral two times a day    MEDICATIONS  (PRN):  acetaminophen     Tablet .. 650 milliGRAM(s) Oral every 6 hours PRN Mild Pain (1 - 3)  benzonatate 100 milliGRAM(s) Oral every 8 hours PRN Cough  dextrose Oral Gel 15 Gram(s) Oral once PRN Blood Glucose LESS THAN 70 milliGRAM(s)/deciliter  hydrALAZINE Injectable 10 milliGRAM(s) IV Push every 6 hours PRN for systolic BP greater than 160  melatonin 3 milliGRAM(s) Oral at bedtime PRN Sleep  oxyCODONE    IR 5 milliGRAM(s) Oral every 6 hours PRN Moderate Pain (4 - 6)  oxyCODONE    IR 10 milliGRAM(s) Oral every 6 hours PRN Severe Pain (7 - 10)      RADIOLOGY & ADDITIONAL TESTS:      ACC: [id number] EXAM:  US DPLX LWR EXT VEINS LTD RT   ORDERED BY: AVA BOOGIE     PROCEDURE DATE:  02/09/2024          INTERPRETATION:  CLINICAL INFORMATION: Right leg pain. Evaluate for DVT.    COMPARISON: None available.    TECHNIQUE: Duplex sonography of the RIGHT LOWER extremity veins with   color and spectral Doppler, with and without compression.    FINDINGS:    There is normal compressibility of the right common femoral, femoral and   popliteal veins.  The contralateral common femoral vein is patent.  Doppler examination shows normal spontaneous and phasic flow.    No calf vein thrombosis is detected. Please note that the gastrocnemius   and soleal veins are not visualized, precluding assessment.    IMPRESSION:  No evidence of right lower extremity deep venous thrombosis. See   described limitations above.      ACC: [id number] EXAM:  US DPLX CAROTIDS COMPL BI   ORDERED BY: ALFRDEO CARLTON     PROCEDURE DATE:  02/06/2024          INTERPRETATION:  CLINICAL INFORMATION: Confirm a high grade carotid   artery stenosis identified on a maxillofacial CT examination, dated   2/4/2024    COMPARISON: None available.    TECHNIQUE: Grayscale, color and spectral Doppler examination of both   carotid arteries was performed.    FINDINGS:    Prominent calcified atheromatous plaques impeding antegrade flow are   present bilaterally in the right and left internal carotid arteries in   the regions of the bifurcations.    Peak systolic velocities are as follows:    RIGHT:  PROX CCA = 94.82  DIST CCA = 105.01  PROX ICA = 263/30  DIST ICA = 136.92  ECA = 318    LEFT:  PROX CCA = 113.88  DIST CCA = 123.20  PROX ICA = 607/160  DIST ICA = 151.21  ECA = 164.87    There is retrograde flow in the right vertebral artery.    There is antegrade flow in the left vertebral artery.    IMPRESSION:    There are high grade, greater than 70% stenoses of both the right and   left internal carotid arteries.    There are flow-limiting stenoses of the right and left external carotid   arteries.    There is retrograde flow in the right vertebral artery.    A dedicated CTA or MRA of the great vessels arising from the arch is   recommended to confirm the high grade stenoses of the right and left   internal carotid arteries and to investigate the retrograde flow in the   right vertebral artery with possible steal phenomenon.    ACC: [id number] EXAM:  US KIDNEYS AND BLADDER   ORDERED BY: ALLI HAYES     PROCEDURE DATE:  02/04/2024          INTERPRETATION:  CLINICAL INFORMATION: Evaluate for hydro-    COMPARISON: 7/30/2018    TECHNIQUE: Sonography of the kidneys and bladder.    FINDINGS:  Right kidney: 13.1 cm. upper pole simple appearing cyst measuring 3.6 x   3.2 x 4.4 cm and severe hydronephrosis    Left kidney: 12.4 cm. severe hydronephrosis    Urinary bladder: Only the right ureteral jets were seen. Prevoid volume   is 1871 cc    IMPRESSION:  Right kidney exophytic cyst appears simple. Bilateral severe   hydronephrosis. Only a right ureteral jet was seen. Large prevoid volume        ACC: [id number] EXAM:  CT MAXILLOFACIAL IC   ORDERED BY: ILSA MIRANDA     PROCEDURE DATE:  02/04/2024          INTERPRETATION:  History: Swelling of the left face, question facial   abscess near the nose.  73-year-old with past medical history for   diabetes, hypertension, polio presents emergency department with left   facial swelling. Patient started with symptoms approximately 3 days ago.   Patient noticed the painful area left nose. Now patient with more   swelling left side of face. Patient did take some amoxicillin at home   which helped but did not take last night and now swelling is worse today.    Description: A postcontrast CT scan of the maxillofacial region was   performed.    No prior maxillofacial imaging study was available for comparison at this   Medical Center.    Axial images with coronal and sagittal reformatted series were obtained.    90 cc intravenous Omnipaque 350 contrast was administered.    Extensive nasal and perinasal soft tissue swelling is noted. Soft tissue   swelling also involves the left preseptal periorbital soft tissues,   without evidence for post septal extension at this time. This most likely   reflects a soft tissue infectious phlegmon-cellulitis, without focal   abscess collection at this time.    Both maxillary sinuses are severely opacified. The secretions demonstrate   increased density on the right, which may reflect chronicity-inspissation   versus chronic fungal sinus disease. The walls of the maxillary sinuses   are mildly thickened suggesting a chronic sinusitis component. Mild   mucosal thickening involves the ethmoid sinuses. The sphenoid sinus is   overall well aerated.    Bilateral nasal bone deformities are noted which may reflect old   fractures (there is no submitted clinicalhistory of recent facial   trauma).    A high-grade stenosis involves the origin of the left internal carotid   artery, likely measuring greater than 80% via NASCET criteria. Calcified   plaque and moderate stenosis involves the origin of the right internal   carotid artery. Correlation with carotid ultrasound is recommended.    Impacted horizontally oriented wisdom teeth involve the bilateral   maxilla-floor of the maxillary sinuses.    IMPRESSION:    Extensive nasal and perinasal soft tissue swelling is noted. Soft tissue   swelling also involves the left preseptal periorbital soft tissues,   without evidence for post septal extension at this time. This most likely   reflects a soft tissue infectious phlegmon-cellulitis, without focal   abscess collection at this time.    Both maxillary sinuses are severely opacified. The secretions demonstrate   increased density on the right, which may reflect chronicity-inspissation   versus chronic fungal sinus disease. The walls of the maxillary sinuses   are mildly thickened suggesting a chronic sinusitis component.      ACC: [id number] EXAM:  CT ABDOMEN AND PELVIS   ORDERED BY: ILSA MIRANDA     PROCEDURE DATE:  02/04/2024          INTERPRETATION:  CLINICAL INFORMATION: Elevated creatinine, urinary   retention    COMPARISON: 7/26/2018    CONTRAST/COMPLICATIONS:  IV Contrast: NONE  Oral Contrast: NONE  Complications: None reported at time of study completion    PROCEDURE:  CT of the Abdomen and Pelvis was performed.  Sagittal and coronal reformats were performed.    FINDINGS:  LOWER CHEST: Within normal limits.    LIVER: Within normal limits.  BILE DUCTS: Normal caliber.  GALLBLADDER: Cholelithiasis.  SPLEEN: Within normal limits.  PANCREAS: Within normal limits.  ADRENALS: Within normal limits.  KIDNEYS/URETERS: Marked bilateral hydronephrosis, as noted previously, to   the level of the bladder, increased in extent since the previous study   with increased distention and dilatation of the ureters. Increased size   of slightly hyperattenuating lesion from the upper pole of the right   kidney, measuring 3.8 cm, compared to 1.6 cm previously. Smaller,   bilateral simple cysts.    BLADDER: Distended with trabeculations  REPRODUCTIVE ORGANS: Enlarged prostate gland with seeds in place    BOWEL: No bowel obstruction.  PERITONEUM: No ascites.  VESSELS: Within normal limits.  RETROPERITONEUM/LYMPH NODES: No lymphadenopathy.  ABDOMINAL WALL: Within normal limits.  BONES: Degenerative changes. Bilateral marked muscular atrophy in the hip   joints.    IMPRESSION:  1.  Marked bilateral hydronephrosis to the level of the bladder,   increased in severity and extent since the previous study.  2.  Increased size of a hyperattenuating exophytic right renal lesion,   possible hyper proteinaceous cyst. Would recommend elective repeat   ultrasound.        ACC: [id number] EXAM:  XR FOOT 2 VIEWS RT   ORDERED BY: AVA BOOGIE     PROCEDURE DATE:  02/09/2024          INTERPRETATION:  INDICATION: Right heel pain and erythema. Rule out   fracture or infection    TECHNIQUE: 3 views of the right foot    COMPARISON: None.    FINDINGS: Surgical hardware is present in the region of the talocalcaneal   joint. There is bony fusion/syndesmosis deformity of most of the   hindfoot/distal tibia and fibula. No fracture is seen. No cortical   irregularity or erosion is seen to suggest osteomyelitis. Vascular   calcifications are noted. No subcutaneous tracking gas is seen.    IMPRESSION: No fracture or radiographic evidence of infection visualized   in the right foot with attention to the right heel.

## 2024-02-14 NOTE — BRIEF OPERATIVE NOTE - OPERATION/FINDINGS
14F SP tube placed with CT guidance
Attempted Suprapubic catheter.   During the procedure rectal injury.   Called [facility] rectal surgery.   Performed diagnostic laparoscopy, proctosigmoidoscopy and diverting Sigmoid loop colostomy.   No intraperitoneal injury, extraperitoneal rectal injury.
Intra-op consult for rectal injury  Diagnostic laparoscopy and Rigid proctosigmoidoscopy showed extraperitoneal rectal injury  Diverting Sigmoid loop colostomy created  Rectum irrigated transanally until clear

## 2024-02-14 NOTE — PROGRESS NOTE ADULT - ASSESSMENT
73 M with Hx of DM2, HTN, polio with chronic LE paralysis, CKD IV with baseline Cr around 4, BPH, pulmonary HTN was admitted with L facial swelling and left periorbital cellulitis.  He underwent attempt for suprapubic catheter placement for hydronephrosis and SONDRA/CKD.  Hospital course complicated by intraoperative rectal injury, now status post diverting ostomy.    1. L Periorbital cellulitis, clinically improved   -  clinically on ABXs, S/p Zosyn, now on  Meropenem (day #7) and Doxy (day #10), change to PO Doxy  -  afebrile, WBC coming down   -  blood cultures from 2/4 are negative  -  CT reports extensive nasal and perinasal soft tissue swelling. +soft tissue swelling involves L pre-septal periorbital soft tissues without evidence for post septal extension.   -  CT also reports that both maxillary sinuses are severely opacified w/ possible inspissation vs chronic fungal disease + chronic sinusitis  - Pt will need to f/u with ENT outPt (  discussed with Pt but hesitant)     2. High grade Bilateral internal carotid artery stenoses  -  s/p neuro and vascular consults  -  patient has refused MRI/MRA brain  -  continue ASA and statin  -  vascular followup with Dr. Leila Orona upon discharge     3.  SONDRA due to B/L hydronephrosis + Exophytic Renal lesion + Suspected SONDRA on CKD stage IV. Hyperphosphatemia   -  CT reports marked B/L hydronephrosis increased in severity and extent, increased size of a hyperattenuating exophytic right renal lesion 3.8cm  -  s/p attempt for suprapubic catheter placement, complicated by bowel injury requiring procedure to be aborted and now s/p colostomy  -  maintain Murillo in place for now  -  monitor urine output and renal function  -  creatinine improved and stable  -  avoid NSAIDs and nephrotoxic meds  -  continue Sevalemer  -  will eventually need a fistula as he is high risk for progression to ESRD  - IR for SPC placement  today    4. Complicated MDRO  Pseudomonas UTI due to obstructive   uropathy   -  maintain isolation   -  ABXs changed to Meropenem  on 2/8 (day #7)  -  Plan for  7 days of IV Meropenem    5.  Acute Rectal Injury, s/p diverting colostomy  -  wound care per colorectal  -  tolerating diet  -  pain control  -  colostomy care and teaching done by Rakan Padilla RN     6. Hypertension  -  BP  improved  -C/w  Norvasc 10mg daily  - Hydralazine PRN     7. R heel pain   likely  Muscle spasm, responded to muscle relaxants   No clinical signs of cellulitis  Off load heels, turn and position Q 3H  bed with air matrass   did not tolerate  boot   off gabapentin a per Pt is not effective   Dilaudid changed to PO Oxy PRN  Tylenol PRN      8. Cholelithiasis  -  asymptomatic, f/u outpatient     9. H/o Type 2 Diabetes Mellitus  HB A1c 4.9   regular diet     10.  Metabolic Acidosis  - due to advanced CKD, stable  -  continue sodium bicarbonate tabs    11. Anemia of Chronic Disease  -  due to advanced CKD, stable, monitor for now    12. Hx of Polio  -  has chronic paraplegia  -  frequent repositioning and out of bed to chair to minimize pressure ulcer injuries      DVT prophylaxis  -  venodynes, ordered SQ Heparin, but patient is reluctant to take it, he has been counseled on the risk of developing a DVT/PE while he is bedbound    Dispo:  IR for SPC placement today

## 2024-02-15 LAB
ANION GAP SERPL CALC-SCNC: 9 MMOL/L — SIGNIFICANT CHANGE UP (ref 5–17)
BUN SERPL-MCNC: 62 MG/DL — HIGH (ref 7–23)
CALCIUM SERPL-MCNC: 8 MG/DL — LOW (ref 8.5–10.1)
CHLORIDE SERPL-SCNC: 102 MMOL/L — SIGNIFICANT CHANGE UP (ref 96–108)
CO2 SERPL-SCNC: 25 MMOL/L — SIGNIFICANT CHANGE UP (ref 22–31)
CREAT SERPL-MCNC: 3.27 MG/DL — HIGH (ref 0.5–1.3)
EGFR: 19 ML/MIN/1.73M2 — LOW
GLUCOSE SERPL-MCNC: 89 MG/DL — SIGNIFICANT CHANGE UP (ref 70–99)
HCT VFR BLD CALC: 29.1 % — LOW (ref 39–50)
HGB BLD-MCNC: 9.3 G/DL — LOW (ref 13–17)
MCHC RBC-ENTMCNC: 29.1 PG — SIGNIFICANT CHANGE UP (ref 27–34)
MCHC RBC-ENTMCNC: 32 GM/DL — SIGNIFICANT CHANGE UP (ref 32–36)
MCV RBC AUTO: 90.9 FL — SIGNIFICANT CHANGE UP (ref 80–100)
PLATELET # BLD AUTO: 272 K/UL — SIGNIFICANT CHANGE UP (ref 150–400)
POTASSIUM SERPL-MCNC: 4.3 MMOL/L — SIGNIFICANT CHANGE UP (ref 3.5–5.3)
POTASSIUM SERPL-SCNC: 4.3 MMOL/L — SIGNIFICANT CHANGE UP (ref 3.5–5.3)
RBC # BLD: 3.2 M/UL — LOW (ref 4.2–5.8)
RBC # FLD: 12.6 % — SIGNIFICANT CHANGE UP (ref 10.3–14.5)
SODIUM SERPL-SCNC: 136 MMOL/L — SIGNIFICANT CHANGE UP (ref 135–145)
WBC # BLD: 13.01 K/UL — HIGH (ref 3.8–10.5)
WBC # FLD AUTO: 13.01 K/UL — HIGH (ref 3.8–10.5)

## 2024-02-15 PROCEDURE — 93970 EXTREMITY STUDY: CPT | Mod: 26

## 2024-02-15 PROCEDURE — 99233 SBSQ HOSP IP/OBS HIGH 50: CPT

## 2024-02-15 PROCEDURE — 99231 SBSQ HOSP IP/OBS SF/LOW 25: CPT

## 2024-02-15 RX ADMIN — Medication 3 MILLIGRAM(S): at 22:01

## 2024-02-15 RX ADMIN — OXYCODONE HYDROCHLORIDE 10 MILLIGRAM(S): 5 TABLET ORAL at 14:33

## 2024-02-15 RX ADMIN — Medication 100 MILLIGRAM(S): at 18:15

## 2024-02-15 RX ADMIN — ATORVASTATIN CALCIUM 40 MILLIGRAM(S): 80 TABLET, FILM COATED ORAL at 21:57

## 2024-02-15 RX ADMIN — Medication 650 MILLIGRAM(S): at 21:58

## 2024-02-15 RX ADMIN — Medication 100 MILLIGRAM(S): at 09:09

## 2024-02-15 RX ADMIN — Medication 100 MILLIGRAM(S): at 21:58

## 2024-02-15 RX ADMIN — AMLODIPINE BESYLATE 10 MILLIGRAM(S): 2.5 TABLET ORAL at 09:07

## 2024-02-15 RX ADMIN — OXYCODONE HYDROCHLORIDE 10 MILLIGRAM(S): 5 TABLET ORAL at 22:01

## 2024-02-15 RX ADMIN — Medication 1 MILLIGRAM(S): at 09:08

## 2024-02-15 RX ADMIN — CYCLOBENZAPRINE HYDROCHLORIDE 10 MILLIGRAM(S): 10 TABLET, FILM COATED ORAL at 21:57

## 2024-02-15 RX ADMIN — Medication 650 MILLIGRAM(S): at 09:08

## 2024-02-15 RX ADMIN — PREGABALIN 1000 MICROGRAM(S): 225 CAPSULE ORAL at 09:09

## 2024-02-15 RX ADMIN — OXYCODONE HYDROCHLORIDE 10 MILLIGRAM(S): 5 TABLET ORAL at 22:31

## 2024-02-15 RX ADMIN — Medication 81 MILLIGRAM(S): at 09:08

## 2024-02-15 RX ADMIN — SEVELAMER CARBONATE 800 MILLIGRAM(S): 2400 POWDER, FOR SUSPENSION ORAL at 09:07

## 2024-02-15 NOTE — PROGRESS NOTE ADULT - ASSESSMENT
73 M with Hx of DM2, HTN, polio with chronic LE paralysis, CKD IV with baseline Cr around 4, BPH, pulmonary HTN was admitted with L facial swelling and left periorbital cellulitis.  He underwent attempt for suprapubic catheter placement for hydronephrosis and SONDRA/CKD.  Hospital course complicated by intraoperative rectal injury, now status post diverting ostomy.    1. L Periorbital cellulitis, clinically improved   -  clinically on ABXs, S/p Zosyn, now on  Meropenem (day #7) completed  and Doxy (day #10) will complete Tx   -  afebrile, WBC up after [rpcedure, likely reactive   -  blood cultures from 2/4 are negative  -  CT reports extensive nasal and perinasal soft tissue swelling. +soft tissue swelling involves L pre-septal periorbital soft tissues without evidence for post septal extension.   -  CT also reports that both maxillary sinuses are severely opacified w/ possible inspissation vs chronic fungal disease + chronic sinusitis  - Pt will need to f/u with ENT outPt (  discussed with Pt but hesitant)     2. High grade Bilateral internal carotid artery stenoses  -  s/p neuro and vascular consults  -  patient has refused MRI/MRA brain  -  continue ASA and statin  -  vascular followup with Dr. Leila Orona upon discharge     3.  SONDRA due to B/L hydronephrosis + Exophytic Renal lesion + Suspected SONDRA on CKD stage IV. Hyperphosphatemia   -  CT reports marked B/L hydronephrosis increased in severity and extent, increased size of a hyperattenuating exophytic right renal lesion 3.8cm  - s/p attempt for suprapubic catheter placement, complicated by bowel injury requiring procedure to be aborted and now s/p colostomy  - S/p SPT placement   -  monitor urine output and renal function  -  creatinine still trending down, on IVF post procedure, will  complete  as d/w Dr Barron   -  avoid NSAIDs and nephrotoxic meds  -  continue Sevalemer  -  will eventually need a fistula as he is high risk for progression to ESRD  - labs in am     4. Complicated MDRO  Pseudomonas UTI due to obstructive   uropathy   -  maintain isolation   -  Completed Meropenem  7 days course       5. Urinary obstruction   -  CT reports marked B/L hydronephrosis increased in severity and extent, increased size of a hyperattenuating exophytic right renal lesion 3.8cm  -  s/p attempt for suprapubic catheter placement, complicated by bowel injury requiring procedure to be aborted and now s/p colostomy  - S/p SPT placement on 2/14/24, mcconnell removed   - now with hematuria, excepted post procedure as [er urology team   - trend H/H  - Monitor UO   - Monitor for improvement     6.  Acute Rectal Injury, s/p diverting colostomy  -  wound care per colorectal  -  tolerating diet  -  pain control  -  colostomy care and teaching done by Rakan Padilla RN       7. Hypertension  -  BP  improved  -C/w  Norvasc 10mg daily  - Hydralazine PRN     8. R heel pain   likely  Muscle spasm, responded to muscle relaxants   No clinical signs of cellulitis  Off load heels, turn and position Q 3H  bed with air matrass   did not tolerate  boot   off gabapentin a per Pt is not effective   Dilaudid changed to PO Oxy PRN  Tylenol PRN   now with calf pain , refusing DVT PPX, will check dopplers to r/o DVT      9. Cholelithiasis  -  asymptomatic, f/u outpatient     10. H/o Type 2 Diabetes Mellitus  HB A1c 4.9   regular diet     11.  Metabolic Acidosis  - due to advanced CKD, stable  -  continue sodium bicarbonate tabs    12. Anemia of Chronic Disease  -  due to advanced CKD, stable, monitor for now    13. Hx of Polio  -  has chronic paraplegia  -  frequent repositioning and out of bed to chair to minimize pressure ulcer injuries      DVT prophylaxis  -  venodynes, ordered SQ Heparin, but patient is reluctant to take it, he has been counseled on the risk of developing a DVT/PE while he is bedbound    Dispo:  monitor H/H and hematuria, labs in am, NICOLE coe dc planning

## 2024-02-15 NOTE — PROGRESS NOTE ADULT - SUBJECTIVE AND OBJECTIVE BOX
CC: L facial swelling.     HPI: 74 y/o M w/ PMH of DM2, HTN, polio with paraplegia,  CKD IV, BPH, pulmonary HTN, p/w L facial swelling. Swelling started 3 days PTA  and has gotten progressively worse. C/o pain as well. Patient also has discomfort in his nose. Patient took a dose of amoxicillin outpatient. No  fever, chills, cough, runny nose, sore throat. Patient stated  that he is suppose to have a suprapubic catheter placed soon.     INTERVAL HPI/ OVERNIGHT EVENTS:  Pt was seen and examined, s/p SPT placement yesterday, reports feels tired some bladder pain, also report R calf pain. But states that able to sleep at night and  " meds are working" POC discussed       Vital Signs Last 24 Hrs  T(C): 36.4 (15 Feb 2024 15:59), Max: 36.6 (14 Feb 2024 22:22)  T(F): 97.5 (15 Feb 2024 15:59), Max: 97.8 (14 Feb 2024 22:22)  HR: 86 (15 Feb 2024 15:59) (86 - 92)  BP: 147/61 (15 Feb 2024 15:59) (138/63 - 149/71)  RR: 18 (15 Feb 2024 07:50) (16 - 18)  SpO2: 98% (15 Feb 2024 15:59) (95% - 99%)    Parameters below as of 15 Feb 2024 15:59  Patient On (Oxygen Delivery Method): room air        REVIEW OF SYSTEMS:  All other review of systems is negative unless indicated above.      PHYSICAL EXAM:  General: in no acute distress  Eyes: EOMI; conjunctiva and sclera clear,  L upper/lower lid   edema resolved,  no erythema or warmth   Head: Normocephalic; atraumatic  ENMT: No nasal discharge; airway clear  Neck: Supple; non tender; no masses  Respiratory: No wheezes, rales or rhonchi  Cardiovascular: Regular rate and rhythm. S1 and S2 Normal;   Gastrointestinal: Soft non-tender non-distended; Normal bowel sounds.  Ostomy in place   with small amount of  stool     Genitourinary: No  suprapubic  tenderness, Murillo in place   Extremities: Min  feet edema, no erythema or warmth, RLE, +   tenderness to palpation over calf   Neurological: Alert and oriented x3, speech is clear, + paraplegia   Musculoskeletal: Decreased  muscle tone LE b/l,  LLE everted   Psychiatric: Cooperative        LABS:                         9.3    13.01 )-----------( 272      ( 15 Feb 2024 08:17 )             29.1     02-15    136  |  102  |  62<H>  ----------------------------<  89  4.3   |  25  |  3.27<H>    Ca    8.0<L>      15 Feb 2024 08:17      Urinalysis Basic - ( 15 Feb 2024 08:17 )  Color: x / Appearance: x / SG: x / pH: x  Gluc: 89 mg/dL / Ketone: x  / Bili: x / Urobili: x   Blood: x / Protein: x / Nitrite: x   Leuk Esterase: x / RBC: x / WBC x   Sq Epi: x / Non Sq Epi: x / Bacteria: x                            9.4    11.84 )-----------( 284      ( 14 Feb 2024 07:27 )             29.4     14 Feb 2024 07:27    137    |  105    |  62     ----------------------------<  85     4.2     |  27     |  3.40     Ca    8.0        14 Feb 2024 07:27        Urinalysis Basic - ( 12 Feb 2024 05:42 )  Color: x / Appearance: x / SG: x / pH: x  Gluc: 97 mg/dL / Ketone: x  / Bili: x / Urobili: x   Blood: x / Protein: x / Nitrite: x   Leuk Esterase: x / RBC: x / WBC x   Sq Epi: x / Non Sq Epi: x / Bacteria: x                          9.4    13.39 )-----------( 286      ( 11 Feb 2024 07:42 )             29.2       Urinalysis Basic - ( 10 Feb 2024 07:55 )  Color: x / Appearance: x / SG: x / pH: x  Gluc: 126 mg/dL / Ketone: x  / Bili: x / Urobili: x   Blood: x / Protein: x / Nitrite: x   Leuk Esterase: x / RBC: x / WBC x   Sq Epi: x / Non Sq Epi: x / Bacteria: x    Culture - Urine (02.05.24 @ 17:06)   - Levofloxacin: R 4  - Ceftazidime: S 8  - Levofloxacin: I 2  - Ceftazidime: S 8  - Ciprofloxacin: S 0.5  - Ciprofloxacin: R 2  - Aztreonam: R >16  - Aztreonam: R >16  - Cefepime: I 16  - Cefepime: I 16  - Imipenem: S 2  - Imipenem: S <=1  - Meropenem: S 2  - Piperacillin/Tazobactam: I 32  - Piperacillin/Tazobactam: I 32  - Meropenem: S <=1  Specimen Source: OR Collect None  Culture Results:   >100,000 CFU/ml Pseudomonas aeruginosa   >100,000 CFU/ml Pseudomonas aeruginosa #2   Multiple Morphological Strains  Organism Identification: Pseudomonas aeruginosa   Pseudomonas aeruginosa  Organism: Pseudomonas aeruginosa  Organism: Pseudomonas aeruginosa      MEDICATIONS  (STANDING):  amLODIPine   Tablet 10 milliGRAM(s) Oral daily  aspirin enteric coated 81 milliGRAM(s) Oral daily  atorvastatin 40 milliGRAM(s) Oral at bedtime  dextrose 5%. 1000 milliLiter(s) (50 mL/Hr) IV Continuous <Continuous>  dextrose 5%. 1000 milliLiter(s) (100 mL/Hr) IV Continuous <Continuous>  dextrose 50% Injectable 12.5 Gram(s) IV Push once  dextrose 50% Injectable 25 Gram(s) IV Push once  dextrose 50% Injectable 25 Gram(s) IV Push once  doxycycline IVPB 100 milliGRAM(s) IV Intermittent every 12 hours  glucagon  Injectable 1 milliGRAM(s) IntraMuscular once  heparin   Injectable 5000 Unit(s) SubCutaneous every 8 hours  meropenem Injectable 500 milliGRAM(s) IV Push every 12 hours  sevelamer carbonate 800 milliGRAM(s) Oral three times a day with meals  sodium bicarbonate 650 milliGRAM(s) Oral two times a day    MEDICATIONS  (PRN):  acetaminophen     Tablet .. 650 milliGRAM(s) Oral every 6 hours PRN Mild Pain (1 - 3)  benzonatate 100 milliGRAM(s) Oral every 8 hours PRN Cough  dextrose Oral Gel 15 Gram(s) Oral once PRN Blood Glucose LESS THAN 70 milliGRAM(s)/deciliter  hydrALAZINE Injectable 10 milliGRAM(s) IV Push every 6 hours PRN for systolic BP greater than 160  melatonin 3 milliGRAM(s) Oral at bedtime PRN Sleep  oxyCODONE    IR 5 milliGRAM(s) Oral every 6 hours PRN Moderate Pain (4 - 6)  oxyCODONE    IR 10 milliGRAM(s) Oral every 6 hours PRN Severe Pain (7 - 10)      RADIOLOGY & ADDITIONAL TESTS:      ACC: [id number] EXAM:  US DPLX LWR EXT VEINS LTD RT   ORDERED BY: AVA BOOGIE     PROCEDURE DATE:  02/09/2024          INTERPRETATION:  CLINICAL INFORMATION: Right leg pain. Evaluate for DVT.    COMPARISON: None available.    TECHNIQUE: Duplex sonography of the RIGHT LOWER extremity veins with   color and spectral Doppler, with and without compression.    FINDINGS:    There is normal compressibility of the right common femoral, femoral and   popliteal veins.  The contralateral common femoral vein is patent.  Doppler examination shows normal spontaneous and phasic flow.    No calf vein thrombosis is detected. Please note that the gastrocnemius   and soleal veins are not visualized, precluding assessment.    IMPRESSION:  No evidence of right lower extremity deep venous thrombosis. See   described limitations above.      ACC: [id number] EXAM:  US DPLX CAROTIDS COMPL BI   ORDERED BY: ALFREDO CARLTON     PROCEDURE DATE:  02/06/2024          INTERPRETATION:  CLINICAL INFORMATION: Confirm a high grade carotid   artery stenosis identified on a maxillofacial CT examination, dated   2/4/2024    COMPARISON: None available.    TECHNIQUE: Grayscale, color and spectral Doppler examination of both   carotid arteries was performed.    FINDINGS:    Prominent calcified atheromatous plaques impeding antegrade flow are   present bilaterally in the right and left internal carotid arteries in   the regions of the bifurcations.    Peak systolic velocities are as follows:    RIGHT:  PROX CCA = 94.82  DIST CCA = 105.01  PROX ICA = 263/30  DIST ICA = 136.92  ECA = 318    LEFT:  PROX CCA = 113.88  DIST CCA = 123.20  PROX ICA = 607/160  DIST ICA = 151.21  ECA = 164.87    There is retrograde flow in the right vertebral artery.    There is antegrade flow in the left vertebral artery.    IMPRESSION:    There are high grade, greater than 70% stenoses of both the right and   left internal carotid arteries.    There are flow-limiting stenoses of the right and left external carotid   arteries.    There is retrograde flow in the right vertebral artery.    A dedicated CTA or MRA of the great vessels arising from the arch is   recommended to confirm the high grade stenoses of the right and left   internal carotid arteries and to investigate the retrograde flow in the   right vertebral artery with possible steal phenomenon.    ACC: [id number] EXAM:  US KIDNEYS AND BLADDER   ORDERED BY: ALLI HAYES     PROCEDURE DATE:  02/04/2024          INTERPRETATION:  CLINICAL INFORMATION: Evaluate for hydro-    COMPARISON: 7/30/2018    TECHNIQUE: Sonography of the kidneys and bladder.    FINDINGS:  Right kidney: 13.1 cm. upper pole simple appearing cyst measuring 3.6 x   3.2 x 4.4 cm and severe hydronephrosis    Left kidney: 12.4 cm. severe hydronephrosis    Urinary bladder: Only the right ureteral jets were seen. Prevoid volume   is 1871 cc    IMPRESSION:  Right kidney exophytic cyst appears simple. Bilateral severe   hydronephrosis. Only a right ureteral jet was seen. Large prevoid volume        ACC: [id number] EXAM:  CT MAXILLOFACIAL IC   ORDERED BY: ILSA MIRANDA     PROCEDURE DATE:  02/04/2024          INTERPRETATION:  History: Swelling of the left face, question facial   abscess near the nose.  73-year-old with past medical history for   diabetes, hypertension, polio presents emergency department with left   facial swelling. Patient started with symptoms approximately 3 days ago.   Patient noticed the painful area left nose. Now patient with more   swelling left side of face. Patient did take some amoxicillin at home   which helped but did not take last night and now swelling is worse today.    Description: A postcontrast CT scan of the maxillofacial region was   performed.    No prior maxillofacial imaging study was available for comparison at this   Medical Center.    Axial images with coronal and sagittal reformatted series were obtained.    90 cc intravenous Omnipaque 350 contrast was administered.    Extensive nasal and perinasal soft tissue swelling is noted. Soft tissue   swelling also involves the left preseptal periorbital soft tissues,   without evidence for post septal extension at this time. This most likely   reflects a soft tissue infectious phlegmon-cellulitis, without focal   abscess collection at this time.    Both maxillary sinuses are severely opacified. The secretions demonstrate   increased density on the right, which may reflect chronicity-inspissation   versus chronic fungal sinus disease. The walls of the maxillary sinuses   are mildly thickened suggesting a chronic sinusitis component. Mild   mucosal thickening involves the ethmoid sinuses. The sphenoid sinus is   overall well aerated.    Bilateral nasal bone deformities are noted which may reflect old   fractures (there is no submitted clinicalhistory of recent facial   trauma).    A high-grade stenosis involves the origin of the left internal carotid   artery, likely measuring greater than 80% via NASCET criteria. Calcified   plaque and moderate stenosis involves the origin of the right internal   carotid artery. Correlation with carotid ultrasound is recommended.    Impacted horizontally oriented wisdom teeth involve the bilateral   maxilla-floor of the maxillary sinuses.    IMPRESSION:    Extensive nasal and perinasal soft tissue swelling is noted. Soft tissue   swelling also involves the left preseptal periorbital soft tissues,   without evidence for post septal extension at this time. This most likely   reflects a soft tissue infectious phlegmon-cellulitis, without focal   abscess collection at this time.    Both maxillary sinuses are severely opacified. The secretions demonstrate   increased density on the right, which may reflect chronicity-inspissation   versus chronic fungal sinus disease. The walls of the maxillary sinuses   are mildly thickened suggesting a chronic sinusitis component.      ACC: [id number] EXAM:  CT ABDOMEN AND PELVIS   ORDERED BY: ILSA MIRANDA     PROCEDURE DATE:  02/04/2024          INTERPRETATION:  CLINICAL INFORMATION: Elevated creatinine, urinary   retention    COMPARISON: 7/26/2018    CONTRAST/COMPLICATIONS:  IV Contrast: NONE  Oral Contrast: NONE  Complications: None reported at time of study completion    PROCEDURE:  CT of the Abdomen and Pelvis was performed.  Sagittal and coronal reformats were performed.    FINDINGS:  LOWER CHEST: Within normal limits.    LIVER: Within normal limits.  BILE DUCTS: Normal caliber.  GALLBLADDER: Cholelithiasis.  SPLEEN: Within normal limits.  PANCREAS: Within normal limits.  ADRENALS: Within normal limits.  KIDNEYS/URETERS: Marked bilateral hydronephrosis, as noted previously, to   the level of the bladder, increased in extent since the previous study   with increased distention and dilatation of the ureters. Increased size   of slightly hyperattenuating lesion from the upper pole of the right   kidney, measuring 3.8 cm, compared to 1.6 cm previously. Smaller,   bilateral simple cysts.    BLADDER: Distended with trabeculations  REPRODUCTIVE ORGANS: Enlarged prostate gland with seeds in place    BOWEL: No bowel obstruction.  PERITONEUM: No ascites.  VESSELS: Within normal limits.  RETROPERITONEUM/LYMPH NODES: No lymphadenopathy.  ABDOMINAL WALL: Within normal limits.  BONES: Degenerative changes. Bilateral marked muscular atrophy in the hip   joints.    IMPRESSION:  1.  Marked bilateral hydronephrosis to the level of the bladder,   increased in severity and extent since the previous study.  2.  Increased size of a hyperattenuating exophytic right renal lesion,   possible hyper proteinaceous cyst. Would recommend elective repeat   ultrasound.        ACC: [id number] EXAM:  XR FOOT 2 VIEWS RT   ORDERED BY: AVA BOOGIE     PROCEDURE DATE:  02/09/2024          INTERPRETATION:  INDICATION: Right heel pain and erythema. Rule out   fracture or infection    TECHNIQUE: 3 views of the right foot    COMPARISON: None.    FINDINGS: Surgical hardware is present in the region of the talocalcaneal   joint. There is bony fusion/syndesmosis deformity of most of the   hindfoot/distal tibia and fibula. No fracture is seen. No cortical   irregularity or erosion is seen to suggest osteomyelitis. Vascular   calcifications are noted. No subcutaneous tracking gas is seen.    IMPRESSION: No fracture or radiographic evidence of infection visualized   in the right foot with attention to the right heel.

## 2024-02-15 NOTE — PROGRESS NOTE ADULT - SUBJECTIVE AND OBJECTIVE BOX
Patient seen at bedside. Pt is POD 1 s/p IR guided SPT placement. Notes some incisional tenderness and hematuria He denies any flank pain, fevers, chills.     PE  General: No distress, No anxiety  Vital Signs Last 24 Hrs  T(C): 36.2 (15 Feb 2024 07:50), Max: 36.6 (14 Feb 2024 22:22)  T(F): 97.2 (15 Feb 2024 07:50), Max: 97.8 (14 Feb 2024 22:22)  HR: 86 (15 Feb 2024 07:50) (86 - 92)  BP: 149/71 (15 Feb 2024 07:50) (138/63 - 149/71)  BP(mean): --  RR: 18 (15 Feb 2024 07:50) (16 - 18)  SpO2: 95% (15 Feb 2024 07:50) (95% - 99%)            Skin     : No jaundice  Lung    : No resp distress  Abdo:   : Obese abdomen, Non tender, No distension, + colostomy in place, +SPT in place draining red urine no clots  Genitalia Male: No mcconnell  Neuro   : A&Ox3      LABS                            9.3    13.01 )-----------( 272      ( 15 Feb 2024 08:17 )             29.1   02-15    136  |  102  |  62<H>  ----------------------------<  89  4.3   |  25  |  3.27<H>    Ca    8.0<L>      15 Feb 2024 08:17

## 2024-02-15 NOTE — PROGRESS NOTE ADULT - ASSESSMENT
74 y/o M w/ PMH of DM2, HTN, polio, CKD IV, BPH, pulmonary HTN, p/w L facial swelling with renal evaluation of CKD IV setting of DM and chronic untreated obstructive uropathy.    CKD IV sec to chronic obstructive uropathy     CKD progression likely as Cr 3's in 2022  vs SONDRA on CKD      - monitor for recovery   Cr continues to trend down - monitor OFF IVF     -SPT as per    -Ostomy w intra op rectal injury  -encourage po - pt is drinking well   -NO nsaids, avoid contrast  - sevelamer for hyperphos - check Phos     Met Acidosis - on po bicarb   - stable     d/w Dr Britton. RN staff, wife at length      As per Dr Lima discussions - pt is aware he needs close CKD follow up on discharge but pt has hx of poor compliance in past and  aware he is high risk for ESRD progression  IF DC home then followup with Dr Lima for CKD surveillance

## 2024-02-15 NOTE — PROGRESS NOTE ADULT - SUBJECTIVE AND OBJECTIVE BOX
Patient is a 73y Male who reports no complaints as new  SPT in place with gross hematuria   tolerating po     MEDICATIONS  (STANDING):  amLODIPine   Tablet 10 milliGRAM(s) Oral daily  aspirin enteric coated 81 milliGRAM(s) Oral daily  atorvastatin 40 milliGRAM(s) Oral at bedtime  cyanocobalamin 1000 MICROGram(s) Oral daily  cyclobenzaprine 10 milliGRAM(s) Oral at bedtime  dextrose 5%. 1000 milliLiter(s) (50 mL/Hr) IV Continuous <Continuous>  dextrose 5%. 1000 milliLiter(s) (100 mL/Hr) IV Continuous <Continuous>  dextrose 50% Injectable 12.5 Gram(s) IV Push once  dextrose 50% Injectable 25 Gram(s) IV Push once  dextrose 50% Injectable 25 Gram(s) IV Push once  doxycycline monohydrate Capsule 100 milliGRAM(s) Oral every 12 hours  folic acid 1 milliGRAM(s) Oral daily  glucagon  Injectable 1 milliGRAM(s) IntraMuscular once  lactated ringers. 1000 milliLiter(s) (75 mL/Hr) IV Continuous <Continuous>  sevelamer carbonate 800 milliGRAM(s) Oral three times a day with meals  sodium bicarbonate 650 milliGRAM(s) Oral two times a day    Vital Signs Last 24 Hrs  T(C): 36.2 (15 Feb 2024 07:50), Max: 36.6 (14 Feb 2024 22:22)  T(F): 97.2 (15 Feb 2024 07:50), Max: 97.8 (14 Feb 2024 22:22)  HR: 86 (15 Feb 2024 07:50) (86 - 92)  BP: 149/71 (15 Feb 2024 07:50) (138/63 - 149/71)  BP(mean): --  RR: 18 (15 Feb 2024 07:50) (16 - 18)  SpO2: 95% (15 Feb 2024 07:50) (95% - 99%)    Parameters below as of 15 Feb 2024 07:50  Patient On (Oxygen Delivery Method): room air      PHYSICAL EXAM:    Constitutional: NAD, well-groomed, well-developed  HEENT: PERRLA, EOMI,  MMM  Neck: No LAD, No JVD  Respiratory: CTAB  Cardiovascular: S1 and S2, RRR  Gastrointestinal: ostomy  Extremities: No peripheral edema  Neurological: A/O x 3n   : Murillo          LABS:                                   9.3    13.01 )-----------( 272      ( 15 Feb 2024 08:17 )             29.1                         9.4    11.84 )-----------( 284      ( 14 Feb 2024 07:27 )             29.4           136    |  102    |  62     ----------------------------<  89        15 Feb 2024 08:17  4.3     |  25     |  3.27     137    |  105    |  62     ----------------------------<  85        14 Feb 2024 07:27  4.2     |  27     |  3.40     136    |  106    |  56     ----------------------------<  93        13 Feb 2024 08:06  4.1     |  27     |  3.53     Ca    8.0        15 Feb 2024 08:17  Ca    8.0        14 Feb 2024 07:27      Mg     1.7       12 Feb 2024 05:42            Urine Studies:  Urinalysis Basic - ( 07 Feb 2024 07:22 )    Color: x / Appearance: x / SG: x / pH: x  Gluc: 121 mg/dL / Ketone: x  / Bili: x / Urobili: x   Blood: x / Protein: x / Nitrite: x   Leuk Esterase: x / RBC: x / WBC x   Sq Epi: x / Non Sq Epi: x / Bacteria: x            RADIOLOGY & ADDITIONAL STUDIES:    ACC: [id number] EXAM:  US KIDNEYS AND BLADDER   ORDERED BY: ALLI HAYES     PROCEDURE DATE:  02/04/2024          INTERPRETATION:  CLINICAL INFORMATION: Evaluate for hydro-    COMPARISON: 7/30/2018    TECHNIQUE: Sonography of the kidneys and bladder.    FINDINGS:  Right kidney: 13.1 cm. upper pole simple appearing cyst measuring 3.6 x   3.2 x 4.4 cm and severe hydronephrosis    Left kidney: 12.4 cm. severe hydronephrosis    Urinary bladder: Only the right ureteral jets were seen. Prevoid volume   is 1871 cc    IMPRESSION:  Right kidney exophytic cyst appears simple. Bilateral severe   hydronephrosis. Only a right ureteral jet was seen. Large prevoid volume

## 2024-02-15 NOTE — PROGRESS NOTE ADULT - SUBJECTIVE AND OBJECTIVE BOX
Interventional Radiology Follow-Up Note.    This is a 73y Male s/p 14F SP tube placed with CT guidance on 12/14 in Interventional Radiology with Dr. Bah.    Reports pain at the site. Unable to rate it per pt.      Medication:   amLODIPine   Tablet: (02-15)  aspirin enteric coated: (02-15)  doxycycline IVPB: (02-13)  doxycycline monohydrate Capsule: (02-15)  meropenem Injectable: (02-14)    Vitals:   T(F): 97.2, Max: 97.8 (22:22)  HR: 86  BP: 149/71  RR: 18  SpO2: 95%    Physical Exam:  General: Nontoxic, in NAD.  Abdomen: soft, NTND.   Drain Device: Drain intact attached to gravity drain bag. Drain with dark red colored urine.  Flushed with 5cc NS w/o difficulty. Dressing clean, dry, intact.   24hr Drain output: 900cc    LABS:  WBC 13.01 / Hgb 9.3 / Hct 29.1 / Plt 272  Na 136 / K 4.3 / CO2 25 / Cl 102 / BUN 62 / Cr 3.27 / Glucose 89  ALT -- / AST -- / Alk Phos -- / Tbili --  Ptt -- / Pt -- / INR --        Assessment/Plan: 74 yo Male s/p attempted SPT placement on 2/5/24 complicated by bowel injury s/p colostomy placement and urethral mcconnell placement s/p SPT placement on 2/14.    - Drain blood tinged urine. H&H stable.  - flush drain twice a day until urine clears.   - global care per primary team.   - Recommend drain exchange every 3 months a out pt.

## 2024-02-16 LAB
ANION GAP SERPL CALC-SCNC: 8 MMOL/L — SIGNIFICANT CHANGE UP (ref 5–17)
BUN SERPL-MCNC: 68 MG/DL — HIGH (ref 7–23)
CALCIUM SERPL-MCNC: 8.1 MG/DL — LOW (ref 8.5–10.1)
CHLORIDE SERPL-SCNC: 103 MMOL/L — SIGNIFICANT CHANGE UP (ref 96–108)
CO2 SERPL-SCNC: 26 MMOL/L — SIGNIFICANT CHANGE UP (ref 22–31)
CREAT SERPL-MCNC: 3.26 MG/DL — HIGH (ref 0.5–1.3)
EGFR: 19 ML/MIN/1.73M2 — LOW
GLUCOSE SERPL-MCNC: 83 MG/DL — SIGNIFICANT CHANGE UP (ref 70–99)
PHOSPHATE SERPL-MCNC: 6.2 MG/DL — HIGH (ref 2.5–4.5)
POTASSIUM SERPL-MCNC: 4.4 MMOL/L — SIGNIFICANT CHANGE UP (ref 3.5–5.3)
POTASSIUM SERPL-SCNC: 4.4 MMOL/L — SIGNIFICANT CHANGE UP (ref 3.5–5.3)
SODIUM SERPL-SCNC: 137 MMOL/L — SIGNIFICANT CHANGE UP (ref 135–145)

## 2024-02-16 PROCEDURE — 99232 SBSQ HOSP IP/OBS MODERATE 35: CPT

## 2024-02-16 PROCEDURE — 99231 SBSQ HOSP IP/OBS SF/LOW 25: CPT

## 2024-02-16 RX ADMIN — Medication 100 MILLIGRAM(S): at 09:14

## 2024-02-16 RX ADMIN — Medication 81 MILLIGRAM(S): at 09:13

## 2024-02-16 RX ADMIN — Medication 1 MILLIGRAM(S): at 09:13

## 2024-02-16 RX ADMIN — Medication 650 MILLIGRAM(S): at 22:04

## 2024-02-16 RX ADMIN — ATORVASTATIN CALCIUM 40 MILLIGRAM(S): 80 TABLET, FILM COATED ORAL at 22:05

## 2024-02-16 RX ADMIN — SEVELAMER CARBONATE 800 MILLIGRAM(S): 2400 POWDER, FOR SUSPENSION ORAL at 12:34

## 2024-02-16 RX ADMIN — CYCLOBENZAPRINE HYDROCHLORIDE 10 MILLIGRAM(S): 10 TABLET, FILM COATED ORAL at 22:05

## 2024-02-16 RX ADMIN — Medication 3 MILLIGRAM(S): at 22:05

## 2024-02-16 RX ADMIN — SEVELAMER CARBONATE 800 MILLIGRAM(S): 2400 POWDER, FOR SUSPENSION ORAL at 09:13

## 2024-02-16 RX ADMIN — OXYCODONE HYDROCHLORIDE 10 MILLIGRAM(S): 5 TABLET ORAL at 22:04

## 2024-02-16 RX ADMIN — Medication 650 MILLIGRAM(S): at 09:13

## 2024-02-16 RX ADMIN — SEVELAMER CARBONATE 800 MILLIGRAM(S): 2400 POWDER, FOR SUSPENSION ORAL at 17:58

## 2024-02-16 RX ADMIN — PREGABALIN 1000 MICROGRAM(S): 225 CAPSULE ORAL at 09:13

## 2024-02-16 RX ADMIN — OXYCODONE HYDROCHLORIDE 10 MILLIGRAM(S): 5 TABLET ORAL at 22:38

## 2024-02-16 RX ADMIN — Medication 100 MILLIGRAM(S): at 22:05

## 2024-02-16 RX ADMIN — AMLODIPINE BESYLATE 10 MILLIGRAM(S): 2.5 TABLET ORAL at 09:13

## 2024-02-16 NOTE — PROGRESS NOTE ADULT - ASSESSMENT
A/P: 73y Male s/p   Continue SPT to bag drainage  D/C home with SPT tomorrow.  Above discussed with Dr. Xiao

## 2024-02-16 NOTE — PROGRESS NOTE ADULT - SUBJECTIVE AND OBJECTIVE BOX
CC: L facial swelling.     HPI: 74 y/o M w/ PMH of DM2, HTN, polio with paraplegia,  CKD IV, BPH, pulmonary HTN, p/w L facial swelling. Swelling started 3 days PTA  and has gotten progressively worse. C/o pain as well. Patient also has discomfort in his nose. Patient took a dose of amoxicillin outpatient. No  fever, chills, cough, runny nose, sore throat. Patient stated  that he is suppose to have a suprapubic catheter placed soon.     INTERVAL HPI/ OVERNIGHT EVENTS:  Pt was seen and examined, s/p SPT placement yesterday, reports feels tired some bladder pain, also report R calf pain. But states that able to sleep at night and  " meds are working" POC discussed   02/16/24: Patient seen and examined. Hematuria resolved. No new issues.       Vital Signs Last 24 Hrs  T(C): 36.4 (16 Feb 2024 08:14), Max: 37.1 (15 Feb 2024 22:15)  T(F): 97.5 (16 Feb 2024 08:14), Max: 98.7 (15 Feb 2024 22:15)  HR: 84 (16 Feb 2024 08:14) (84 - 86)  BP: 126/72 (16 Feb 2024 08:14) (126/72 - 147/61)  BP(mean): --  RR: 18 (16 Feb 2024 08:14) (18 - 18)  SpO2: 97% (16 Feb 2024 08:14) (95% - 98%)    Parameters below as of 16 Feb 2024 08:14  Patient On (Oxygen Delivery Method): room air          REVIEW OF SYSTEMS:  All other review of systems is negative unless indicated above.      PHYSICAL EXAM:  General: in no acute distress  Eyes: EOMI; conjunctiva and sclera clear,  L upper/lower lid   edema resolved,  no erythema or warmth   Head: Normocephalic; atraumatic  ENMT: No nasal discharge; airway clear  Neck: Supple; non tender; no masses  Respiratory: No wheezes, rales or rhonchi  Cardiovascular: Regular rate and rhythm. S1 and S2 Normal;   Gastrointestinal: Soft non-tender non-distended; Normal bowel sounds.  Ostomy in place   with small amount of  stool     Genitourinary: No  suprapubic  tenderness, Murillo in place   Extremities: Min  feet edema, no erythema or warmth, RLE, +   tenderness to palpation over calf   Neurological: Alert and oriented x3, speech is clear, + paraplegia   Musculoskeletal: Decreased  muscle tone LE b/l,  LLE everted   Psychiatric: Cooperative        LABS:                                      9.3    13.01 )-----------( 272      ( 15 Feb 2024 08:17 )             29.1     16 Feb 2024 07:06    137    |  103    |  68     ----------------------------<  83     4.4     |  26     |  3.26     Ca    8.1        16 Feb 2024 07:06  Phos  6.2       16 Feb 2024 07:06          CAPILLARY BLOOD GLUCOSE            Urinalysis Basic - ( 16 Feb 2024 07:06 )    Color: x / Appearance: x / SG: x / pH: x  Gluc: 83 mg/dL / Ketone: x  / Bili: x / Urobili: x   Blood: x / Protein: x / Nitrite: x   Leuk Esterase: x / RBC: x / WBC x   Sq Epi: x / Non Sq Epi: x / Bacteria: x              Culture - Urine (02.05.24 @ 17:06)   - Levofloxacin: R 4  - Ceftazidime: S 8  - Levofloxacin: I 2  - Ceftazidime: S 8  - Ciprofloxacin: S 0.5  - Ciprofloxacin: R 2  - Aztreonam: R >16  - Aztreonam: R >16  - Cefepime: I 16  - Cefepime: I 16  - Imipenem: S 2  - Imipenem: S <=1  - Meropenem: S 2  - Piperacillin/Tazobactam: I 32  - Piperacillin/Tazobactam: I 32  - Meropenem: S <=1  Specimen Source: OR Collect None  Culture Results:   >100,000 CFU/ml Pseudomonas aeruginosa   >100,000 CFU/ml Pseudomonas aeruginosa #2   Multiple Morphological Strains  Organism Identification: Pseudomonas aeruginosa   Pseudomonas aeruginosa  Organism: Pseudomonas aeruginosa  Organism: Pseudomonas aeruginosa      MEDICATIONS  (STANDING):  amLODIPine   Tablet 10 milliGRAM(s) Oral daily  aspirin enteric coated 81 milliGRAM(s) Oral daily  atorvastatin 40 milliGRAM(s) Oral at bedtime  dextrose 5%. 1000 milliLiter(s) (50 mL/Hr) IV Continuous <Continuous>  dextrose 5%. 1000 milliLiter(s) (100 mL/Hr) IV Continuous <Continuous>  dextrose 50% Injectable 12.5 Gram(s) IV Push once  dextrose 50% Injectable 25 Gram(s) IV Push once  dextrose 50% Injectable 25 Gram(s) IV Push once  doxycycline IVPB 100 milliGRAM(s) IV Intermittent every 12 hours  glucagon  Injectable 1 milliGRAM(s) IntraMuscular once  heparin   Injectable 5000 Unit(s) SubCutaneous every 8 hours  meropenem Injectable 500 milliGRAM(s) IV Push every 12 hours  sevelamer carbonate 800 milliGRAM(s) Oral three times a day with meals  sodium bicarbonate 650 milliGRAM(s) Oral two times a day    MEDICATIONS  (PRN):  acetaminophen     Tablet .. 650 milliGRAM(s) Oral every 6 hours PRN Mild Pain (1 - 3)  benzonatate 100 milliGRAM(s) Oral every 8 hours PRN Cough  dextrose Oral Gel 15 Gram(s) Oral once PRN Blood Glucose LESS THAN 70 milliGRAM(s)/deciliter  hydrALAZINE Injectable 10 milliGRAM(s) IV Push every 6 hours PRN for systolic BP greater than 160  melatonin 3 milliGRAM(s) Oral at bedtime PRN Sleep  oxyCODONE    IR 5 milliGRAM(s) Oral every 6 hours PRN Moderate Pain (4 - 6)  oxyCODONE    IR 10 milliGRAM(s) Oral every 6 hours PRN Severe Pain (7 - 10)      RADIOLOGY & ADDITIONAL TESTS:      ACC: [id number] EXAM:  US DPLX LWR EXT VEINS LTD RT   ORDERED BY: AVA BOOGIE     PROCEDURE DATE:  02/09/2024          INTERPRETATION:  CLINICAL INFORMATION: Right leg pain. Evaluate for DVT.    COMPARISON: None available.    TECHNIQUE: Duplex sonography of the RIGHT LOWER extremity veins with   color and spectral Doppler, with and without compression.    FINDINGS:    There is normal compressibility of the right common femoral, femoral and   popliteal veins.  The contralateral common femoral vein is patent.  Doppler examination shows normal spontaneous and phasic flow.    No calf vein thrombosis is detected. Please note that the gastrocnemius   and soleal veins are not visualized, precluding assessment.    IMPRESSION:  No evidence of right lower extremity deep venous thrombosis. See   described limitations above.      ACC: [id number] EXAM:  US DPLX CAROTIDS COMPL BI   ORDERED BY: ALFREDO CARLTON     PROCEDURE DATE:  02/06/2024          INTERPRETATION:  CLINICAL INFORMATION: Confirm a high grade carotid   artery stenosis identified on a maxillofacial CT examination, dated   2/4/2024    COMPARISON: None available.    TECHNIQUE: Grayscale, color and spectral Doppler examination of both   carotid arteries was performed.    FINDINGS:    Prominent calcified atheromatous plaques impeding antegrade flow are   present bilaterally in the right and left internal carotid arteries in   the regions of the bifurcations.    Peak systolic velocities are as follows:    RIGHT:  PROX CCA = 94.82  DIST CCA = 105.01  PROX ICA = 263/30  DIST ICA = 136.92  ECA = 318    LEFT:  PROX CCA = 113.88  DIST CCA = 123.20  PROX ICA = 607/160  DIST ICA = 151.21  ECA = 164.87    There is retrograde flow in the right vertebral artery.    There is antegrade flow in the left vertebral artery.    IMPRESSION:    There are high grade, greater than 70% stenoses of both the right and   left internal carotid arteries.    There are flow-limiting stenoses of the right and left external carotid   arteries.    There is retrograde flow in the right vertebral artery.    A dedicated CTA or MRA of the great vessels arising from the arch is   recommended to confirm the high grade stenoses of the right and left   internal carotid arteries and to investigate the retrograde flow in the   right vertebral artery with possible steal phenomenon.    ACC: [id number] EXAM:  US KIDNEYS AND BLADDER   ORDERED BY: ALLI HAYES     PROCEDURE DATE:  02/04/2024          INTERPRETATION:  CLINICAL INFORMATION: Evaluate for hydro-    COMPARISON: 7/30/2018    TECHNIQUE: Sonography of the kidneys and bladder.    FINDINGS:  Right kidney: 13.1 cm. upper pole simple appearing cyst measuring 3.6 x   3.2 x 4.4 cm and severe hydronephrosis    Left kidney: 12.4 cm. severe hydronephrosis    Urinary bladder: Only the right ureteral jets were seen. Prevoid volume   is 1871 cc    IMPRESSION:  Right kidney exophytic cyst appears simple. Bilateral severe   hydronephrosis. Only a right ureteral jet was seen. Large prevoid volume        ACC: [id number] EXAM:  CT MAXILLOFACIAL IC   ORDERED BY: ILSA MIRANDA     PROCEDURE DATE:  02/04/2024          INTERPRETATION:  History: Swelling of the left face, question facial   abscess near the nose.  73-year-old with past medical history for   diabetes, hypertension, polio presents emergency department with left   facial swelling. Patient started with symptoms approximately 3 days ago.   Patient noticed the painful area left nose. Now patient with more   swelling left side of face. Patient did take some amoxicillin at home   which helped but did not take last night and now swelling is worse today.    Description: A postcontrast CT scan of the maxillofacial region was   performed.    No prior maxillofacial imaging study was available for comparison at this   Medical Center.    Axial images with coronal and sagittal reformatted series were obtained.    90 cc intravenous Omnipaque 350 contrast was administered.    Extensive nasal and perinasal soft tissue swelling is noted. Soft tissue   swelling also involves the left preseptal periorbital soft tissues,   without evidence for post septal extension at this time. This most likely   reflects a soft tissue infectious phlegmon-cellulitis, without focal   abscess collection at this time.    Both maxillary sinuses are severely opacified. The secretions demonstrate   increased density on the right, which may reflect chronicity-inspissation   versus chronic fungal sinus disease. The walls of the maxillary sinuses   are mildly thickened suggesting a chronic sinusitis component. Mild   mucosal thickening involves the ethmoid sinuses. The sphenoid sinus is   overall well aerated.    Bilateral nasal bone deformities are noted which may reflect old   fractures (there is no submitted clinicalhistory of recent facial   trauma).    A high-grade stenosis involves the origin of the left internal carotid   artery, likely measuring greater than 80% via NASCET criteria. Calcified   plaque and moderate stenosis involves the origin of the right internal   carotid artery. Correlation with carotid ultrasound is recommended.    Impacted horizontally oriented wisdom teeth involve the bilateral   maxilla-floor of the maxillary sinuses.    IMPRESSION:    Extensive nasal and perinasal soft tissue swelling is noted. Soft tissue   swelling also involves the left preseptal periorbital soft tissues,   without evidence for post septal extension at this time. This most likely   reflects a soft tissue infectious phlegmon-cellulitis, without focal   abscess collection at this time.    Both maxillary sinuses are severely opacified. The secretions demonstrate   increased density on the right, which may reflect chronicity-inspissation   versus chronic fungal sinus disease. The walls of the maxillary sinuses   are mildly thickened suggesting a chronic sinusitis component.      ACC: [id number] EXAM:  CT ABDOMEN AND PELVIS   ORDERED BY: ILSA MIRANDA     PROCEDURE DATE:  02/04/2024          INTERPRETATION:  CLINICAL INFORMATION: Elevated creatinine, urinary   retention    COMPARISON: 7/26/2018    CONTRAST/COMPLICATIONS:  IV Contrast: NONE  Oral Contrast: NONE  Complications: None reported at time of study completion    PROCEDURE:  CT of the Abdomen and Pelvis was performed.  Sagittal and coronal reformats were performed.    FINDINGS:  LOWER CHEST: Within normal limits.    LIVER: Within normal limits.  BILE DUCTS: Normal caliber.  GALLBLADDER: Cholelithiasis.  SPLEEN: Within normal limits.  PANCREAS: Within normal limits.  ADRENALS: Within normal limits.  KIDNEYS/URETERS: Marked bilateral hydronephrosis, as noted previously, to   the level of the bladder, increased in extent since the previous study   with increased distention and dilatation of the ureters. Increased size   of slightly hyperattenuating lesion from the upper pole of the right   kidney, measuring 3.8 cm, compared to 1.6 cm previously. Smaller,   bilateral simple cysts.    BLADDER: Distended with trabeculations  REPRODUCTIVE ORGANS: Enlarged prostate gland with seeds in place    BOWEL: No bowel obstruction.  PERITONEUM: No ascites.  VESSELS: Within normal limits.  RETROPERITONEUM/LYMPH NODES: No lymphadenopathy.  ABDOMINAL WALL: Within normal limits.  BONES: Degenerative changes. Bilateral marked muscular atrophy in the hip   joints.    IMPRESSION:  1.  Marked bilateral hydronephrosis to the level of the bladder,   increased in severity and extent since the previous study.  2.  Increased size of a hyperattenuating exophytic right renal lesion,   possible hyper proteinaceous cyst. Would recommend elective repeat   ultrasound.        ACC: [id number] EXAM:  XR FOOT 2 VIEWS RT   ORDERED BY: AVA BOOGIE     PROCEDURE DATE:  02/09/2024          INTERPRETATION:  INDICATION: Right heel pain and erythema. Rule out   fracture or infection    TECHNIQUE: 3 views of the right foot    COMPARISON: None.    FINDINGS: Surgical hardware is present in the region of the talocalcaneal   joint. There is bony fusion/syndesmosis deformity of most of the   hindfoot/distal tibia and fibula. No fracture is seen. No cortical   irregularity or erosion is seen to suggest osteomyelitis. Vascular   calcifications are noted. No subcutaneous tracking gas is seen.    IMPRESSION: No fracture or radiographic evidence of infection visualized   in the right foot with attention to the right heel.

## 2024-02-16 NOTE — PROGRESS NOTE ADULT - SUBJECTIVE AND OBJECTIVE BOX
Pt resting in bed. Rohith SPT. No complaints    Vital Signs Last 24 Hrs  T(C): 36.4 (16 Feb 2024 08:14), Max: 37.1 (15 Feb 2024 22:15)  T(F): 97.5 (16 Feb 2024 08:14), Max: 98.7 (15 Feb 2024 22:15)  HR: 84 (16 Feb 2024 08:14) (84 - 86)  BP: 126/72 (16 Feb 2024 08:14) (126/72 - 147/61)  BP(mean): --  RR: 18 (16 Feb 2024 08:14) (18 - 18)  SpO2: 97% (16 Feb 2024 08:14) (95% - 98%)    Parameters below as of 16 Feb 2024 08:14  Patient On (Oxygen Delivery Method): room air        I&O's Summary    15 Feb 2024 07:01  -  16 Feb 2024 07:00  --------------------------------------------------------  IN: 420 mL / OUT: 1800 mL / NET: -1380 mL        Physical Exam  Gen: NAD, A&Ox3  Abd: Soft, NT, ND, No bladder palp           SPT in place draining blood tinged urine. no clots                          9.3    13.01 )-----------( 272      ( 15 Feb 2024 08:17 )             29.1       02-16    137  |  103  |  68<H>  ----------------------------<  83  4.4   |  26  |  3.26<H>    Ca    8.1<L>      16 Feb 2024 07:06  Phos  6.2     02-16

## 2024-02-16 NOTE — PROGRESS NOTE ADULT - PROBLEM SELECTOR PLAN 1
- Drain blood tinged urine. H&H stable.  - flush drain daily with 10 cc sterile saline until urine clears.   - Dressing should be changed every 2-3 days or whenever soiled/wet  - global care per primary team.   - Recommend drain exchange every 3 months a out pt.

## 2024-02-16 NOTE — PROGRESS NOTE ADULT - ASSESSMENT
73 M with Hx of DM2, HTN, polio with chronic LE paralysis, CKD IV with baseline Cr around 4, BPH, pulmonary HTN was admitted with L facial swelling and left periorbital cellulitis.  He underwent attempt for suprapubic catheter placement for hydronephrosis and SONDRA/CKD.  Hospital course complicated by intraoperative rectal injury, now status post diverting ostomy.    1. L Periorbital cellulitis, clinically improved   -  clinically on ABXs, S/p Zosyn, now on  Meropenem (day #7) completed  and Doxy (day #10) will complete Tx   -  afebrile, WBC up after [rpcedure, likely reactive   -  blood cultures from 2/4 are negative  -  CT reports extensive nasal and perinasal soft tissue swelling. +soft tissue swelling involves L pre-septal periorbital soft tissues without evidence for post septal extension.   -  CT also reports that both maxillary sinuses are severely opacified w/ possible inspissation vs chronic fungal disease + chronic sinusitis  - Pt will need to f/u with ENT outPt (  discussed with Pt but hesitant)     2. High grade Bilateral internal carotid artery stenoses  -  s/p neuro and vascular consults  -  patient has refused MRI/MRA brain  -  continue ASA and statin  -  vascular followup with Dr. Leila Orona upon discharge     3.  SONDRA due to B/L hydronephrosis + Exophytic Renal lesion + Suspected SONDRA on CKD stage IV. Hyperphosphatemia   -  CT reports marked B/L hydronephrosis increased in severity and extent, increased size of a hyperattenuating exophytic right renal lesion 3.8cm  - s/p attempt for suprapubic catheter placement, complicated by bowel injury requiring procedure to be aborted and now s/p colostomy  - S/p SPT placement   -  monitor urine output and renal function  -  creatinine still trending down, on IVF post procedure, will  complete  as d/w Dr Barron   -  avoid NSAIDs and nephrotoxic meds  -  continue Sevalemer  -  will eventually need a fistula as he is high risk for progression to ESRD    4. Complicated MDRO  Pseudomonas UTI due to obstructive   uropathy   -  maintain isolation   -  Completed Meropenem  7 days course       5. Urinary obstruction   -  CT reports marked B/L hydronephrosis increased in severity and extent, increased size of a hyperattenuating exophytic right renal lesion 3.8cm  -  s/p attempt for suprapubic catheter placement, complicated by bowel injury requiring procedure to be aborted and now s/p colostomy  - S/p SPT placement on 2/14/24, mcconnell removed   - Monitor UO     6.  Acute Rectal Injury, s/p diverting colostomy  -  wound care per colorectal  -  tolerating diet  -  pain control  -  colostomy care and teaching done by Rakan Padilla, RN       7. Hypertension  -  BP  improved  -C/w  Norvasc 10mg daily  - Hydralazine PRN     8. R heel pain   likely  Muscle spasm, responded to muscle relaxants   No clinical signs of cellulitis  Off load heels, turn and position Q 3H  bed with air matrass   did not tolerate  boot   off gabapentin a per Pt is not effective   Dilaudid changed to PO Oxy PRN  Tylenol PRN   now with calf pain , refusing DVT PPX, will check dopplers to r/o DVT      9. Cholelithiasis  -  asymptomatic, f/u outpatient     10. H/o Type 2 Diabetes Mellitus  HB A1c 4.9   regular diet     11.  Metabolic Acidosis  - due to advanced CKD, stable  -  continue sodium bicarbonate tabs    12. Anemia of Chronic Disease  -  due to advanced CKD, stable, monitor for now    13. Hx of Polio  -  has chronic paraplegia  -  frequent repositioning and out of bed to chair to minimize pressure ulcer injuries      DVT prophylaxis  -  venodynes, ordered SQ Heparin, but patient is reluctant to take it, he has been counseled on the risk of developing a DVT/PE while he is bedbound    Dispo:  Home tomorrow

## 2024-02-16 NOTE — PROGRESS NOTE ADULT - NS ATTEND AMEND GEN_ALL_CORE FT
Patient was seen and examined by me, agree with above note, where necessary edits were made.     Murillo draining clear urine.  Suprapubic incision: clean, dry and intact.  Ostomy functioning.  Colorectal following.  Slightly improved renal function.   Nephrology following.  Patient would like to have suprapubic catheter placed during this admission and discontinue indwelling Murillo catheter.  Interventional radiology to see patient for ultrasound-guided suprapubic catheter placement.  Pseudomonas aeruginosa growing from the OR urine culture.  Patient on meropenem and doxycycline per ID.
ASSESSMENT  74 yo male with PMHx of DM2, HTN, polio, CKD IV, BPH, pulmonary HTN, presents with L facial swelling.     Admitted for:  1. L. periorbital cellulitis  2. opacified maxillary sinuses b/l  3. hx b/l hydronephrosis  4. L internal carotid stenosis on imaging.      Went to OR for suprapubic cathether placement, during procedure there was rectal injury. Suprapubic catheter not placed.   Intra op colorectal consult    s/p diagnostic laparoscopy with diverting sigmoid loop colostomy 2/5  no intraperitoneal injury    Plan:  Pain Control  Cont Norvasc, Statin  ASA once OK with Surgery  Clears  Trend Renal Fx  Add PO Bicarb  Murillo  Zosyn and Doxy  [facility]
Patient was seen and examined by me, agree with above note, where necessary edits were made.     Patient seen on 2/16/24 at 6.40 pm. Wife by bedside.     Suprapubic catheter draining cherry colored urine.   Per Patient was clearer earlier, got bloody after change in position.   Drain care per IR.   Discussed will need follow up with IR for upsizing of catheter to 16 Fr earliest 6 weeks and thereafter can have it changed with VNS or in the office.   Patient also keen to have Colostomy reversed. Recommended to follow up with CRS.     Possible discharge on 2/17.
Patient was seen and examined by me, agree with above note, where necessary edits were made.     Cr trending down.   Suprapubic catheter by IR on 2/14/24.   Patient requesting per urethral indwelling Murillo catheter to be discontinued after that.
Patient was seen and examined by me, agree with above note, where necessary edits were made.     Again discussed yesterday's OR events: Cystoscopy, attempted Suprapubic catheter placement complicated by rectal injury requiring indwelling Murillo catheter placement by me, diagnostic laparoscopy and diverting loop colostomy by colorectal surgery.     Discussed treatment options including Suprapubic catheter by IR at later date under US guidance.   Recommended to follow up with colorectal surgery for possible colostomy reversal.     Patient today mentioned that had part of prostate removed 10 years ago in [place] when had Murillo and Urolift procedure 4 years ago.   Discussed Cystoscopic finding of bi lobar prostatic enlargement: severe enlargement of lateral lobes with coaptation.   Also, discussed concern for bladder dysfunction.   Discussed treatment options including Transurethral resection/vaporization/ablation of Prostate.    AAO x 3   Normal respiratory effort  Normal peripheral circulation  Murillo: cloudy urine     Plan:  Continue indwelling Murillo catheter for now.   Irrigate Murillo with 100 ml SW as needed.   Monitor renal function. Nephrology following.   Colorectal surgery team following.   Continue antibiotics. ID following.   Medical management per medicine team.

## 2024-02-16 NOTE — PROGRESS NOTE ADULT - SUBJECTIVE AND OBJECTIVE BOX
72 y/o M w/ PMH of DM2, HTN, polio, CKD IV, BPH, pulmonary HTN, p/w L facial swelling. Swelling started 3 days ago, and has gotten progressively worse. C/o pain as well. Patient also has discomfort in his nose. Patient took a dose of amoxicillin outpatient. Denies fever, chills, cough, runny nose, sore throat. Also c/p pain in L side of nose. Patient states that he is suppose to have a suprapubic catheter placed soon. Patient now s/p IR suprapubic tube.       Vital Signs Last 24 Hrs  T(C): 36.4 (16 Feb 2024 08:14), Max: 37.1 (15 Feb 2024 22:15)  T(F): 97.5 (16 Feb 2024 08:14), Max: 98.7 (15 Feb 2024 22:15)  HR: 84 (16 Feb 2024 08:14) (84 - 86)  BP: 126/72 (16 Feb 2024 08:14) (126/72 - 147/61)  BP(mean): --  RR: 18 (16 Feb 2024 08:14) (18 - 18)  SpO2: 97% (16 Feb 2024 08:14) (95% - 98%)    Parameters below as of 16 Feb 2024 08:14  Patient On (Oxygen Delivery Method): room air        GENERAL APPEARANCE: Well developed, in no acute distress.    LUNGS: Auscultation of the lungs revealed normal breath sounds     CARDIOVASCULAR: There was a regular rate and rhythm    ABDOMEN: Soft and nontender with normal bowel sounds.     NEUROLOGIC: Alert and oriented x 3. Normal affect.     CBC                        9.3    13.01 )-----------( 272      ( 15 Feb 2024 08:17 )             29.1       Chemistry  02-16    137  |  103  |  68<H>  ----------------------------<  83  4.4   |  26  |  3.26<H>    Ca    8.1<L>      16 Feb 2024 07:06  Phos  6.2     02-16

## 2024-02-16 NOTE — PROGRESS NOTE ADULT - ASSESSMENT
72yo M s/p IR suprapubic tube  - 1800cc output overnight  - Urine starting to clear up, only very mildly blood tinged in tubing

## 2024-02-17 LAB
ANION GAP SERPL CALC-SCNC: 5 MMOL/L — SIGNIFICANT CHANGE UP (ref 5–17)
BUN SERPL-MCNC: 67 MG/DL — HIGH (ref 7–23)
CALCIUM SERPL-MCNC: 7.7 MG/DL — LOW (ref 8.5–10.1)
CHLORIDE SERPL-SCNC: 102 MMOL/L — SIGNIFICANT CHANGE UP (ref 96–108)
CO2 SERPL-SCNC: 27 MMOL/L — SIGNIFICANT CHANGE UP (ref 22–31)
CREAT SERPL-MCNC: 3.28 MG/DL — HIGH (ref 0.5–1.3)
EGFR: 19 ML/MIN/1.73M2 — LOW
GLUCOSE SERPL-MCNC: 85 MG/DL — SIGNIFICANT CHANGE UP (ref 70–99)
HCT VFR BLD CALC: 25.6 % — LOW (ref 39–50)
HGB BLD-MCNC: 8.2 G/DL — LOW (ref 13–17)
MCHC RBC-ENTMCNC: 29 PG — SIGNIFICANT CHANGE UP (ref 27–34)
MCHC RBC-ENTMCNC: 32 GM/DL — SIGNIFICANT CHANGE UP (ref 32–36)
MCV RBC AUTO: 90.5 FL — SIGNIFICANT CHANGE UP (ref 80–100)
PLATELET # BLD AUTO: 243 K/UL — SIGNIFICANT CHANGE UP (ref 150–400)
POTASSIUM SERPL-MCNC: 4.2 MMOL/L — SIGNIFICANT CHANGE UP (ref 3.5–5.3)
POTASSIUM SERPL-SCNC: 4.2 MMOL/L — SIGNIFICANT CHANGE UP (ref 3.5–5.3)
RBC # BLD: 2.83 M/UL — LOW (ref 4.2–5.8)
RBC # FLD: 12.4 % — SIGNIFICANT CHANGE UP (ref 10.3–14.5)
SODIUM SERPL-SCNC: 134 MMOL/L — LOW (ref 135–145)
WBC # BLD: 9.49 K/UL — SIGNIFICANT CHANGE UP (ref 3.8–10.5)
WBC # FLD AUTO: 9.49 K/UL — SIGNIFICANT CHANGE UP (ref 3.8–10.5)

## 2024-02-17 PROCEDURE — 99232 SBSQ HOSP IP/OBS MODERATE 35: CPT

## 2024-02-17 RX ADMIN — Medication 650 MILLIGRAM(S): at 21:35

## 2024-02-17 RX ADMIN — Medication 650 MILLIGRAM(S): at 09:13

## 2024-02-17 RX ADMIN — Medication 81 MILLIGRAM(S): at 09:12

## 2024-02-17 RX ADMIN — Medication 100 MILLIGRAM(S): at 21:35

## 2024-02-17 RX ADMIN — SEVELAMER CARBONATE 800 MILLIGRAM(S): 2400 POWDER, FOR SUSPENSION ORAL at 13:15

## 2024-02-17 RX ADMIN — Medication 1 MILLIGRAM(S): at 09:13

## 2024-02-17 RX ADMIN — PREGABALIN 1000 MICROGRAM(S): 225 CAPSULE ORAL at 09:12

## 2024-02-17 RX ADMIN — SEVELAMER CARBONATE 800 MILLIGRAM(S): 2400 POWDER, FOR SUSPENSION ORAL at 17:46

## 2024-02-17 RX ADMIN — SEVELAMER CARBONATE 800 MILLIGRAM(S): 2400 POWDER, FOR SUSPENSION ORAL at 09:13

## 2024-02-17 RX ADMIN — ATORVASTATIN CALCIUM 40 MILLIGRAM(S): 80 TABLET, FILM COATED ORAL at 21:35

## 2024-02-17 RX ADMIN — AMLODIPINE BESYLATE 10 MILLIGRAM(S): 2.5 TABLET ORAL at 09:13

## 2024-02-17 RX ADMIN — CYCLOBENZAPRINE HYDROCHLORIDE 10 MILLIGRAM(S): 10 TABLET, FILM COATED ORAL at 21:35

## 2024-02-17 RX ADMIN — Medication 3 MILLIGRAM(S): at 21:35

## 2024-02-17 RX ADMIN — Medication 100 MILLIGRAM(S): at 09:13

## 2024-02-17 RX ADMIN — OXYCODONE HYDROCHLORIDE 10 MILLIGRAM(S): 5 TABLET ORAL at 21:35

## 2024-02-17 NOTE — PROGRESS NOTE ADULT - SUBJECTIVE AND OBJECTIVE BOX
CC: L facial swelling.     HPI: 72 y/o M w/ PMH of DM2, HTN, polio with paraplegia,  CKD IV, BPH, pulmonary HTN, p/w L facial swelling. Swelling started 3 days PTA  and has gotten progressively worse. C/o pain as well. Patient also has discomfort in his nose. Patient took a dose of amoxicillin outpatient. No  fever, chills, cough, runny nose, sore throat. Patient stated  that he is suppose to have a suprapubic catheter placed soon.     INTERVAL HPI/ OVERNIGHT EVENTS:  Pt was seen and examined, s/p SPT placement yesterday, reports feels tired some bladder pain, also report R calf pain. But states that able to sleep at night and  " meds are working" POC discussed   02/16/24: Patient seen and examined. Hematuria resolved. No new issues.   02/17/24: Now with hematuria and dropped HGB to 8.2 from 9.3 yesterday      Vital Signs Last 24 Hrs  T(C): 36.3 (17 Feb 2024 07:51), Max: 37 (16 Feb 2024 21:35)  T(F): 97.3 (17 Feb 2024 07:51), Max: 98.6 (16 Feb 2024 21:35)  HR: 87 (17 Feb 2024 07:51) (85 - 87)  BP: 148/65 (17 Feb 2024 07:51) (123/64 - 148/65)  BP(mean): --  RR: 18 (17 Feb 2024 07:51) (18 - 18)  SpO2: 98% (17 Feb 2024 07:51) (96% - 98%)    Parameters below as of 17 Feb 2024 07:51  Patient On (Oxygen Delivery Method): room air        REVIEW OF SYSTEMS:  All other review of systems is negative unless indicated above.      PHYSICAL EXAM:  General: in no acute distress  Eyes: EOMI; conjunctiva and sclera clear,  L upper/lower lid   edema resolved,  no erythema or warmth   Head: Normocephalic; atraumatic  ENMT: No nasal discharge; airway clear  Neck: Supple; non tender; no masses  Respiratory: No wheezes, rales or rhonchi  Cardiovascular: Regular rate and rhythm. S1 and S2 Normal;   Gastrointestinal: Soft non-tender non-distended; Normal bowel sounds.  Ostomy in place   with small amount of  stool     Genitourinary: No  suprapubic  tenderness, Murillo in place   Extremities: Min  feet edema, no erythema or warmth, RLE, +   tenderness to palpation over calf   Neurological: Alert and oriented x3, speech is clear, + paraplegia   Musculoskeletal: Decreased  muscle tone LE b/l,  LLE everted   Psychiatric: Cooperative        LABS:                                      8.2    9.49  )-----------( 243      ( 17 Feb 2024 07:24 )             25.6     17 Feb 2024 07:24    134    |  102    |  67     ----------------------------<  85     4.2     |  27     |  3.28     Ca    7.7        17 Feb 2024 07:24  Phos  6.2       16 Feb 2024 07:06          CAPILLARY BLOOD GLUCOSE            Urinalysis Basic - ( 17 Feb 2024 07:24 )    Color: x / Appearance: x / SG: x / pH: x  Gluc: 85 mg/dL / Ketone: x  / Bili: x / Urobili: x   Blood: x / Protein: x / Nitrite: x   Leuk Esterase: x / RBC: x / WBC x   Sq Epi: x / Non Sq Epi: x / Bacteria: x          MEDICATIONS  (STANDING):  amLODIPine   Tablet 10 milliGRAM(s) Oral daily  aspirin enteric coated 81 milliGRAM(s) Oral daily  atorvastatin 40 milliGRAM(s) Oral at bedtime  dextrose 5%. 1000 milliLiter(s) (50 mL/Hr) IV Continuous <Continuous>  dextrose 5%. 1000 milliLiter(s) (100 mL/Hr) IV Continuous <Continuous>  dextrose 50% Injectable 12.5 Gram(s) IV Push once  dextrose 50% Injectable 25 Gram(s) IV Push once  dextrose 50% Injectable 25 Gram(s) IV Push once  doxycycline IVPB 100 milliGRAM(s) IV Intermittent every 12 hours  glucagon  Injectable 1 milliGRAM(s) IntraMuscular once  heparin   Injectable 5000 Unit(s) SubCutaneous every 8 hours  meropenem Injectable 500 milliGRAM(s) IV Push every 12 hours  sevelamer carbonate 800 milliGRAM(s) Oral three times a day with meals  sodium bicarbonate 650 milliGRAM(s) Oral two times a day    MEDICATIONS  (PRN):  acetaminophen     Tablet .. 650 milliGRAM(s) Oral every 6 hours PRN Mild Pain (1 - 3)  benzonatate 100 milliGRAM(s) Oral every 8 hours PRN Cough  dextrose Oral Gel 15 Gram(s) Oral once PRN Blood Glucose LESS THAN 70 milliGRAM(s)/deciliter  hydrALAZINE Injectable 10 milliGRAM(s) IV Push every 6 hours PRN for systolic BP greater than 160  melatonin 3 milliGRAM(s) Oral at bedtime PRN Sleep  oxyCODONE    IR 5 milliGRAM(s) Oral every 6 hours PRN Moderate Pain (4 - 6)  oxyCODONE    IR 10 milliGRAM(s) Oral every 6 hours PRN Severe Pain (7 - 10)      RADIOLOGY & ADDITIONAL TESTS:      ACC: [id number] EXAM:  US DPLX LWR EXT VEINS LTD RT   ORDERED BY: AVA BOOGIE     PROCEDURE DATE:  02/09/2024          INTERPRETATION:  CLINICAL INFORMATION: Right leg pain. Evaluate for DVT.    COMPARISON: None available.    TECHNIQUE: Duplex sonography of the RIGHT LOWER extremity veins with   color and spectral Doppler, with and without compression.    FINDINGS:    There is normal compressibility of the right common femoral, femoral and   popliteal veins.  The contralateral common femoral vein is patent.  Doppler examination shows normal spontaneous and phasic flow.    No calf vein thrombosis is detected. Please note that the gastrocnemius   and soleal veins are not visualized, precluding assessment.    IMPRESSION:  No evidence of right lower extremity deep venous thrombosis. See   described limitations above.      ACC: [id number] EXAM:  US DPLX CAROTIDS COMPL BI   ORDERED BY: ALFREDO CARLTON     PROCEDURE DATE:  02/06/2024          INTERPRETATION:  CLINICAL INFORMATION: Confirm a high grade carotid   artery stenosis identified on a maxillofacial CT examination, dated   2/4/2024    COMPARISON: None available.    TECHNIQUE: Grayscale, color and spectral Doppler examination of both   carotid arteries was performed.    FINDINGS:    Prominent calcified atheromatous plaques impeding antegrade flow are   present bilaterally in the right and left internal carotid arteries in   the regions of the bifurcations.    Peak systolic velocities are as follows:    RIGHT:  PROX CCA = 94.82  DIST CCA = 105.01  PROX ICA = 263/30  DIST ICA = 136.92  ECA = 318    LEFT:  PROX CCA = 113.88  DIST CCA = 123.20  PROX ICA = 607/160  DIST ICA = 151.21  ECA = 164.87    There is retrograde flow in the right vertebral artery.    There is antegrade flow in the left vertebral artery.    IMPRESSION:    There are high grade, greater than 70% stenoses of both the right and   left internal carotid arteries.    There are flow-limiting stenoses of the right and left external carotid   arteries.    There is retrograde flow in the right vertebral artery.    A dedicated CTA or MRA of the great vessels arising from the arch is   recommended to confirm the high grade stenoses of the right and left   internal carotid arteries and to investigate the retrograde flow in the   right vertebral artery with possible steal phenomenon.    ACC: [id number] EXAM:  US KIDNEYS AND BLADDER   ORDERED BY: ALLI HAYES     PROCEDURE DATE:  02/04/2024          INTERPRETATION:  CLINICAL INFORMATION: Evaluate for hydro-    COMPARISON: 7/30/2018    TECHNIQUE: Sonography of the kidneys and bladder.    FINDINGS:  Right kidney: 13.1 cm. upper pole simple appearing cyst measuring 3.6 x   3.2 x 4.4 cm and severe hydronephrosis    Left kidney: 12.4 cm. severe hydronephrosis    Urinary bladder: Only the right ureteral jets were seen. Prevoid volume   is 1871 cc    IMPRESSION:  Right kidney exophytic cyst appears simple. Bilateral severe   hydronephrosis. Only a right ureteral jet was seen. Large prevoid volume        ACC: [id number] EXAM:  CT MAXILLOFACIAL IC   ORDERED BY: ILSA MIRANDA     PROCEDURE DATE:  02/04/2024          INTERPRETATION:  History: Swelling of the left face, question facial   abscess near the nose.  73-year-old with past medical history for   diabetes, hypertension, polio presents emergency department with left   facial swelling. Patient started with symptoms approximately 3 days ago.   Patient noticed the painful area left nose. Now patient with more   swelling left side of face. Patient did take some amoxicillin at home   which helped but did not take last night and now swelling is worse today.    Description: A postcontrast CT scan of the maxillofacial region was   performed.    No prior maxillofacial imaging study was available for comparison at this   Medical Center.    Axial images with coronal and sagittal reformatted series were obtained.    90 cc intravenous Omnipaque 350 contrast was administered.    Extensive nasal and perinasal soft tissue swelling is noted. Soft tissue   swelling also involves the left preseptal periorbital soft tissues,   without evidence for post septal extension at this time. This most likely   reflects a soft tissue infectious phlegmon-cellulitis, without focal   abscess collection at this time.    Both maxillary sinuses are severely opacified. The secretions demonstrate   increased density on the right, which may reflect chronicity-inspissation   versus chronic fungal sinus disease. The walls of the maxillary sinuses   are mildly thickened suggesting a chronic sinusitis component. Mild   mucosal thickening involves the ethmoid sinuses. The sphenoid sinus is   overall well aerated.    Bilateral nasal bone deformities are noted which may reflect old   fractures (there is no submitted clinicalhistory of recent facial   trauma).    A high-grade stenosis involves the origin of the left internal carotid   artery, likely measuring greater than 80% via NASCET criteria. Calcified   plaque and moderate stenosis involves the origin of the right internal   carotid artery. Correlation with carotid ultrasound is recommended.    Impacted horizontally oriented wisdom teeth involve the bilateral   maxilla-floor of the maxillary sinuses.    IMPRESSION:    Extensive nasal and perinasal soft tissue swelling is noted. Soft tissue   swelling also involves the left preseptal periorbital soft tissues,   without evidence for post septal extension at this time. This most likely   reflects a soft tissue infectious phlegmon-cellulitis, without focal   abscess collection at this time.    Both maxillary sinuses are severely opacified. The secretions demonstrate   increased density on the right, which may reflect chronicity-inspissation   versus chronic fungal sinus disease. The walls of the maxillary sinuses   are mildly thickened suggesting a chronic sinusitis component.      ACC: [id number] EXAM:  CT ABDOMEN AND PELVIS   ORDERED BY: ILSA MIRANDA     PROCEDURE DATE:  02/04/2024          INTERPRETATION:  CLINICAL INFORMATION: Elevated creatinine, urinary   retention    COMPARISON: 7/26/2018    CONTRAST/COMPLICATIONS:  IV Contrast: NONE  Oral Contrast: NONE  Complications: None reported at time of study completion    PROCEDURE:  CT of the Abdomen and Pelvis was performed.  Sagittal and coronal reformats were performed.    FINDINGS:  LOWER CHEST: Within normal limits.    LIVER: Within normal limits.  BILE DUCTS: Normal caliber.  GALLBLADDER: Cholelithiasis.  SPLEEN: Within normal limits.  PANCREAS: Within normal limits.  ADRENALS: Within normal limits.  KIDNEYS/URETERS: Marked bilateral hydronephrosis, as noted previously, to   the level of the bladder, increased in extent since the previous study   with increased distention and dilatation of the ureters. Increased size   of slightly hyperattenuating lesion from the upper pole of the right   kidney, measuring 3.8 cm, compared to 1.6 cm previously. Smaller,   bilateral simple cysts.    BLADDER: Distended with trabeculations  REPRODUCTIVE ORGANS: Enlarged prostate gland with seeds in place    BOWEL: No bowel obstruction.  PERITONEUM: No ascites.  VESSELS: Within normal limits.  RETROPERITONEUM/LYMPH NODES: No lymphadenopathy.  ABDOMINAL WALL: Within normal limits.  BONES: Degenerative changes. Bilateral marked muscular atrophy in the hip   joints.    IMPRESSION:  1.  Marked bilateral hydronephrosis to the level of the bladder,   increased in severity and extent since the previous study.  2.  Increased size of a hyperattenuating exophytic right renal lesion,   possible hyper proteinaceous cyst. Would recommend elective repeat   ultrasound.        ACC: [id number] EXAM:  XR FOOT 2 VIEWS RT   ORDERED BY: AVA BOOGIE     PROCEDURE DATE:  02/09/2024          INTERPRETATION:  INDICATION: Right heel pain and erythema. Rule out   fracture or infection    TECHNIQUE: 3 views of the right foot    COMPARISON: None.    FINDINGS: Surgical hardware is present in the region of the talocalcaneal   joint. There is bony fusion/syndesmosis deformity of most of the   hindfoot/distal tibia and fibula. No fracture is seen. No cortical   irregularity or erosion is seen to suggest osteomyelitis. Vascular   calcifications are noted. No subcutaneous tracking gas is seen.    IMPRESSION: No fracture or radiographic evidence of infection visualized   in the right foot with attention to the right heel.

## 2024-02-17 NOTE — PROGRESS NOTE ADULT - ASSESSMENT
73 M with Hx of DM2, HTN, polio with chronic LE paralysis, CKD IV with baseline Cr around 4, BPH, pulmonary HTN was admitted with L facial swelling and left periorbital cellulitis.  He underwent attempt for suprapubic catheter placement for hydronephrosis and SONDRA/CKD.  Hospital course complicated by intraoperative rectal injury, now status post diverting ostomy.    1. L Periorbital cellulitis, clinically improved   -  clinically on ABXs, S/p Zosyn, now on  Meropenem (day #7) completed  and Doxy (day #10) will complete Tx   -  afebrile, WBC up after [rpcedure, likely reactive   -  blood cultures from 2/4 are negative  -  CT reports extensive nasal and perinasal soft tissue swelling. +soft tissue swelling involves L pre-septal periorbital soft tissues without evidence for post septal extension.   -  CT also reports that both maxillary sinuses are severely opacified w/ possible inspissation vs chronic fungal disease + chronic sinusitis  - Pt will need to f/u with ENT outPt (  discussed with Pt but hesitant)     2. High grade Bilateral internal carotid artery stenoses  -  s/p neuro and vascular consults  -  patient has refused MRI/MRA brain  -  continue ASA and statin  -  vascular followup with Dr. Leila Orona upon discharge     3.  SONDRA due to B/L hydronephrosis + Exophytic Renal lesion + Suspected SONDRA on CKD stage IV. Hyperphosphatemia   -  CT reports marked B/L hydronephrosis increased in severity and extent, increased size of a hyperattenuating exophytic right renal lesion 3.8cm  - s/p attempt for suprapubic catheter placement, complicated by bowel injury requiring procedure to be aborted and now s/p colostomy  - S/p SPT placement   -  monitor urine output and renal function  -  avoid NSAIDs and nephrotoxic meds  -  continue Sevalemer  -  will eventually need a fistula as he is high risk for progression to ESRD    4. Complicated MDRO  Pseudomonas UTI due to obstructive   uropathy   -  maintain isolation   -  Completed Meropenem  7 days course     5. Urinary obstruction   -  s/p attempt for suprapubic catheter placement, complicated by bowel injury requiring procedure to be aborted and now s/p colostomy  - S/p SPT placement on 2/14/24, mcconnell removed   - Follow closely due to hematuria, follow HGB    6.  Acute Rectal Injury, s/p diverting colostomy  -  wound care per colorectal  -  colostomy care and teaching done by Rakan Padilla, RN     7. Hypertension  -  BP  improved  -C/w  Norvasc 10mg daily    8. R heel pain   likely  Muscle spasm, responded to muscle relaxants   Off load heels, turn and position Q 3H  bed with air matrass   did not tolerate  boot   off gabapentin a per Pt is not effective   Dilaudid changed to PO Oxy PRN  Tylenol PRN   now with calf pain , refusing DVT PPX, will check dopplers to r/o DVT      9. Cholelithiasis  -  asymptomatic, f/u outpatient     10. H/o Type 2 Diabetes Mellitus  HB A1c 4.9   regular diet     11.  Metabolic Acidosis  - due to advanced CKD, stable  -  continue sodium bicarbonate tabs    12. Anemia of Chronic Disease  -  due to advanced CKD,     13. Hx of Polio  -  has chronic paraplegia  -  frequent repositioning and out of bed to chair to minimize pressure ulcer injuries      DVT prophylaxis  -  venodynes, ordered SQ Heparin, but patient is reluctant to take it, he has been counseled on the risk of developing a DVT/PE while he is bedbound    Dispo:  Home once hematuria resolved

## 2024-02-17 NOTE — PROGRESS NOTE ADULT - ASSESSMENT
72 y/o M w/ PMH of DM2, HTN, polio, CKD IV, BPH, pulmonary HTN, p/w L facial swelling with renal evaluation of CKD IV setting of DM and chronic untreated obstructive uropathy.    CKD IV sec to chronic obstructive uropathy     CKD progression likely as Cr 3's in 2022  vs SONDRA on CKD      - monitor for recovery   Cr continues to trend down - monitor OFF IVF     -SPT as per    -Ostomy w intra op rectal injury  -encourage po - pt is drinking well   -NO nsaids, avoid contrast  - sevelamer for hyperphos - check Phos     Met Acidosis - on po bicarb   - stable     d/w Dr Britton. RN staff, wife at length      2/17  Pt with CKD, SONDRA improving slowly  -At baseline creatinine level states the patient  Hematuria- clearing,   Fluid balance- on po liquids, not on IVF  Anemia- will replace folic acid, b12, check iron levels ordered ferritin, tibc  - anemia due to hematuria as well       As per Dr Lima discussions - pt is aware he needs close CKD follow up on discharge but pt has hx of poor compliance in past and  aware he is high risk for ESRD progression  IF DC home then followup with Dr Lima for CKD surveillance

## 2024-02-17 NOTE — PROGRESS NOTE ADULT - SUBJECTIVE AND OBJECTIVE BOX
Patient is a 73y Male who reports no complaints as new  SPT in place with gross hematuria   tolerating po     2/17- in bed no complaints, hematuria clearing, ostomy  output more formed     MEDICATIONS  (STANDING):  amLODIPine   Tablet 10 milliGRAM(s) Oral daily  aspirin enteric coated 81 milliGRAM(s) Oral daily  atorvastatin 40 milliGRAM(s) Oral at bedtime  cyanocobalamin 1000 MICROGram(s) Oral daily  cyclobenzaprine 10 milliGRAM(s) Oral at bedtime  dextrose 5%. 1000 milliLiter(s) (50 mL/Hr) IV Continuous <Continuous>  dextrose 5%. 1000 milliLiter(s) (100 mL/Hr) IV Continuous <Continuous>  dextrose 50% Injectable 25 Gram(s) IV Push once  dextrose 50% Injectable 25 Gram(s) IV Push once  dextrose 50% Injectable 12.5 Gram(s) IV Push once  doxycycline monohydrate Capsule 100 milliGRAM(s) Oral every 12 hours  folic acid 1 milliGRAM(s) Oral daily  glucagon  Injectable 1 milliGRAM(s) IntraMuscular once  sevelamer carbonate 800 milliGRAM(s) Oral three times a day with meals  sodium bicarbonate 650 milliGRAM(s) Oral two times a day    MEDICATIONS  (PRN):  acetaminophen     Tablet .. 650 milliGRAM(s) Oral every 6 hours PRN Mild Pain (1 - 3)  benzonatate 100 milliGRAM(s) Oral every 8 hours PRN Cough  cyclobenzaprine 5 milliGRAM(s) Oral two times a day PRN Muscle Spasm  dextrose Oral Gel 15 Gram(s) Oral once PRN Blood Glucose LESS THAN 70 milliGRAM(s)/deciliter  hydrALAZINE Injectable 10 milliGRAM(s) IV Push every 6 hours PRN for systolic BP greater than 160  melatonin 3 milliGRAM(s) Oral at bedtime PRN Sleep  ondansetron Injectable 4 milliGRAM(s) IV Push once PRN Nausea and/or Vomiting  oxyCODONE    IR 5 milliGRAM(s) Oral every 6 hours PRN Moderate Pain (4 - 6)      PHYSICAL EXAM:    Constitutional: NAD, well-groomed, well-developed  HEENT: PERRLA, EOMI,  MMM  Neck: No LAD, No JVD  Respiratory: CTAB  Cardiovascular: S1 and S2, RRR  Gastrointestinal: ostomy  Extremities: No peripheral edema  Neurological: A/O x 3n   : Murillo bloody urine          LABS:                          8.2    9.49  )-----------( 243      ( 17 Feb 2024 07:24 )             25.6                                      9.3    13.01 )-----------( 272      ( 15 Feb 2024 08:17 )             29.1                         9.4    11.84 )-----------( 284      ( 14 Feb 2024 07:27 )             29.4         02-17    134<L>  |  102  |  67<H>  ----------------------------<  85  4.2   |  27  |  3.28<H>    Ca    7.7<L>      17 Feb 2024 07:24  Phos  6.2     02-16          136    |  102    |  62     ----------------------------<  89        15 Feb 2024 08:17  4.3     |  25     |  3.27     137    |  105    |  62     ----------------------------<  85        14 Feb 2024 07:27  4.2     |  27     |  3.40     136    |  106    |  56     ----------------------------<  93        13 Feb 2024 08:06  4.1     |  27     |  3.53     Ca    8.0        15 Feb 2024 08:17  Ca    8.0        14 Feb 2024 07:27      Mg     1.7       12 Feb 2024 05:42            Urine Studies:  Urinalysis Basic - ( 07 Feb 2024 07:22 )    Color: x / Appearance: x / SG: x / pH: x  Gluc: 121 mg/dL / Ketone: x  / Bili: x / Urobili: x   Blood: x / Protein: x / Nitrite: x   Leuk Esterase: x / RBC: x / WBC x   Sq Epi: x / Non Sq Epi: x / Bacteria: x            RADIOLOGY & ADDITIONAL STUDIES:    ACC: [id number] EXAM:  US KIDNEYS AND BLADDER   ORDERED BY: ALLI HAYES     PROCEDURE DATE:  02/04/2024          INTERPRETATION:  CLINICAL INFORMATION: Evaluate for hydro-    COMPARISON: 7/30/2018    TECHNIQUE: Sonography of the kidneys and bladder.    FINDINGS:  Right kidney: 13.1 cm. upper pole simple appearing cyst measuring 3.6 x   3.2 x 4.4 cm and severe hydronephrosis    Left kidney: 12.4 cm. severe hydronephrosis    Urinary bladder: Only the right ureteral jets were seen. Prevoid volume   is 1871 cc    IMPRESSION:  Right kidney exophytic cyst appears simple. Bilateral severe   hydronephrosis. Only a right ureteral jet was seen. Large prevoid volume

## 2024-02-18 LAB
ANION GAP SERPL CALC-SCNC: 6 MMOL/L — SIGNIFICANT CHANGE UP (ref 5–17)
BUN SERPL-MCNC: 69 MG/DL — HIGH (ref 7–23)
CALCIUM SERPL-MCNC: 7.9 MG/DL — LOW (ref 8.5–10.1)
CHLORIDE SERPL-SCNC: 102 MMOL/L — SIGNIFICANT CHANGE UP (ref 96–108)
CO2 SERPL-SCNC: 28 MMOL/L — SIGNIFICANT CHANGE UP (ref 22–31)
CREAT SERPL-MCNC: 3.22 MG/DL — HIGH (ref 0.5–1.3)
EGFR: 20 ML/MIN/1.73M2 — LOW
FERRITIN SERPL-MCNC: 275 NG/ML — SIGNIFICANT CHANGE UP (ref 30–400)
GLUCOSE SERPL-MCNC: 85 MG/DL — SIGNIFICANT CHANGE UP (ref 70–99)
HCT VFR BLD CALC: 25 % — LOW (ref 39–50)
HGB BLD-MCNC: 8 G/DL — LOW (ref 13–17)
IRON SATN MFR SERPL: 18 % — SIGNIFICANT CHANGE UP (ref 16–55)
IRON SATN MFR SERPL: 23 UG/DL — LOW (ref 45–165)
MCHC RBC-ENTMCNC: 28.7 PG — SIGNIFICANT CHANGE UP (ref 27–34)
MCHC RBC-ENTMCNC: 32 GM/DL — SIGNIFICANT CHANGE UP (ref 32–36)
MCV RBC AUTO: 89.6 FL — SIGNIFICANT CHANGE UP (ref 80–100)
PLATELET # BLD AUTO: 244 K/UL — SIGNIFICANT CHANGE UP (ref 150–400)
POTASSIUM SERPL-MCNC: 4.1 MMOL/L — SIGNIFICANT CHANGE UP (ref 3.5–5.3)
POTASSIUM SERPL-SCNC: 4.1 MMOL/L — SIGNIFICANT CHANGE UP (ref 3.5–5.3)
RBC # BLD: 2.79 M/UL — LOW (ref 4.2–5.8)
RBC # FLD: 12.2 % — SIGNIFICANT CHANGE UP (ref 10.3–14.5)
SODIUM SERPL-SCNC: 136 MMOL/L — SIGNIFICANT CHANGE UP (ref 135–145)
TIBC SERPL-MCNC: 132 UG/DL — LOW (ref 220–430)
UIBC SERPL-MCNC: 108 UG/DL — LOW (ref 110–370)
WBC # BLD: 9.1 K/UL — SIGNIFICANT CHANGE UP (ref 3.8–10.5)
WBC # FLD AUTO: 9.1 K/UL — SIGNIFICANT CHANGE UP (ref 3.8–10.5)

## 2024-02-18 PROCEDURE — 99232 SBSQ HOSP IP/OBS MODERATE 35: CPT

## 2024-02-18 RX ORDER — OXYCODONE HYDROCHLORIDE 5 MG/1
10 TABLET ORAL ONCE
Refills: 0 | Status: DISCONTINUED | OUTPATIENT
Start: 2024-02-18 | End: 2024-02-18

## 2024-02-18 RX ADMIN — Medication 100 MILLIGRAM(S): at 09:46

## 2024-02-18 RX ADMIN — Medication 3 MILLIGRAM(S): at 22:32

## 2024-02-18 RX ADMIN — CYCLOBENZAPRINE HYDROCHLORIDE 10 MILLIGRAM(S): 10 TABLET, FILM COATED ORAL at 22:32

## 2024-02-18 RX ADMIN — ATORVASTATIN CALCIUM 40 MILLIGRAM(S): 80 TABLET, FILM COATED ORAL at 22:32

## 2024-02-18 RX ADMIN — SEVELAMER CARBONATE 800 MILLIGRAM(S): 2400 POWDER, FOR SUSPENSION ORAL at 13:45

## 2024-02-18 RX ADMIN — AMLODIPINE BESYLATE 10 MILLIGRAM(S): 2.5 TABLET ORAL at 09:45

## 2024-02-18 RX ADMIN — Medication 100 MILLIGRAM(S): at 00:31

## 2024-02-18 RX ADMIN — OXYCODONE HYDROCHLORIDE 10 MILLIGRAM(S): 5 TABLET ORAL at 23:16

## 2024-02-18 RX ADMIN — SEVELAMER CARBONATE 800 MILLIGRAM(S): 2400 POWDER, FOR SUSPENSION ORAL at 09:45

## 2024-02-18 RX ADMIN — PREGABALIN 1000 MICROGRAM(S): 225 CAPSULE ORAL at 09:46

## 2024-02-18 RX ADMIN — SEVELAMER CARBONATE 800 MILLIGRAM(S): 2400 POWDER, FOR SUSPENSION ORAL at 18:18

## 2024-02-18 RX ADMIN — Medication 81 MILLIGRAM(S): at 09:46

## 2024-02-18 RX ADMIN — Medication 1 MILLIGRAM(S): at 09:46

## 2024-02-18 RX ADMIN — Medication 650 MILLIGRAM(S): at 22:32

## 2024-02-18 RX ADMIN — Medication 100 MILLIGRAM(S): at 22:33

## 2024-02-18 RX ADMIN — Medication 650 MILLIGRAM(S): at 09:45

## 2024-02-18 NOTE — PROGRESS NOTE ADULT - SUBJECTIVE AND OBJECTIVE BOX
CC: L facial swelling.     HPI: 72 y/o M w/ PMH of DM2, HTN, polio with paraplegia,  CKD IV, BPH, pulmonary HTN, p/w L facial swelling. Swelling started 3 days PTA  and has gotten progressively worse. C/o pain as well. Patient also has discomfort in his nose. Patient took a dose of amoxicillin outpatient. No  fever, chills, cough, runny nose, sore throat. Patient stated  that he is suppose to have a suprapubic catheter placed soon.     INTERVAL HPI/ OVERNIGHT EVENTS:  Pt was seen and examined, s/p SPT placement yesterday, reports feels tired some bladder pain, also report R calf pain. But states that able to sleep at night and  " meds are working" POC discussed   02/16/24: Patient seen and examined. Hematuria resolved. No new issues.   02/17/24: Now with hematuria and dropped HGB to 8.2 from 9.3 yesterday  02/18/24: Hematuria improving. HGB down to 8.0 today      Vital Signs Last 24 Hrs  T(C): 36.3 (18 Feb 2024 07:58), Max: 36.8 (17 Feb 2024 15:14)  T(F): 97.3 (18 Feb 2024 07:58), Max: 98.2 (17 Feb 2024 15:14)  HR: 86 (18 Feb 2024 07:58) (84 - 86)  BP: 151/70 (18 Feb 2024 07:58) (126/64 - 151/70)  BP(mean): --  RR: 18 (18 Feb 2024 07:58) (17 - 18)  SpO2: 98% (18 Feb 2024 07:58) (97% - 98%)    Parameters below as of 18 Feb 2024 07:58  Patient On (Oxygen Delivery Method): room air            REVIEW OF SYSTEMS:  All other review of systems is negative unless indicated above.      PHYSICAL EXAM:  General: in no acute distress  Eyes: EOMI; conjunctiva and sclera clear,  L upper/lower lid   edema resolved,  no erythema or warmth   Head: Normocephalic; atraumatic  ENMT: No nasal discharge; airway clear  Neck: Supple; non tender; no masses  Respiratory: No wheezes, rales or rhonchi  Cardiovascular: Regular rate and rhythm. S1 and S2 Normal;   Gastrointestinal: Soft non-tender non-distended; Normal bowel sounds.  Ostomy in place   with small amount of  stool     Genitourinary: No  suprapubic  tenderness, Murillo in place   Extremities: Min  feet edema, no erythema or warmth, RLE, +   tenderness to palpation over calf   Neurological: Alert and oriented x3, speech is clear, + paraplegia   Musculoskeletal: Decreased  muscle tone LE b/l,  LLE everted   Psychiatric: Cooperative        LABS:                                           8.0    9.10  )-----------( 244      ( 18 Feb 2024 07:45 )             25.0     18 Feb 2024 07:45    136    |  102    |  69     ----------------------------<  85     4.1     |  28     |  3.22     Ca    7.9        18 Feb 2024 07:45          CAPILLARY BLOOD GLUCOSE            Urinalysis Basic - ( 18 Feb 2024 07:45 )    Color: x / Appearance: x / SG: x / pH: x  Gluc: 85 mg/dL / Ketone: x  / Bili: x / Urobili: x   Blood: x / Protein: x / Nitrite: x   Leuk Esterase: x / RBC: x / WBC x   Sq Epi: x / Non Sq Epi: x / Bacteria: x              MEDICATIONS  (STANDING):  amLODIPine   Tablet 10 milliGRAM(s) Oral daily  aspirin enteric coated 81 milliGRAM(s) Oral daily  atorvastatin 40 milliGRAM(s) Oral at bedtime  dextrose 5%. 1000 milliLiter(s) (50 mL/Hr) IV Continuous <Continuous>  dextrose 5%. 1000 milliLiter(s) (100 mL/Hr) IV Continuous <Continuous>  dextrose 50% Injectable 12.5 Gram(s) IV Push once  dextrose 50% Injectable 25 Gram(s) IV Push once  dextrose 50% Injectable 25 Gram(s) IV Push once  doxycycline IVPB 100 milliGRAM(s) IV Intermittent every 12 hours  glucagon  Injectable 1 milliGRAM(s) IntraMuscular once  heparin   Injectable 5000 Unit(s) SubCutaneous every 8 hours  meropenem Injectable 500 milliGRAM(s) IV Push every 12 hours  sevelamer carbonate 800 milliGRAM(s) Oral three times a day with meals  sodium bicarbonate 650 milliGRAM(s) Oral two times a day    MEDICATIONS  (PRN):  acetaminophen     Tablet .. 650 milliGRAM(s) Oral every 6 hours PRN Mild Pain (1 - 3)  benzonatate 100 milliGRAM(s) Oral every 8 hours PRN Cough  dextrose Oral Gel 15 Gram(s) Oral once PRN Blood Glucose LESS THAN 70 milliGRAM(s)/deciliter  hydrALAZINE Injectable 10 milliGRAM(s) IV Push every 6 hours PRN for systolic BP greater than 160  melatonin 3 milliGRAM(s) Oral at bedtime PRN Sleep  oxyCODONE    IR 5 milliGRAM(s) Oral every 6 hours PRN Moderate Pain (4 - 6)  oxyCODONE    IR 10 milliGRAM(s) Oral every 6 hours PRN Severe Pain (7 - 10)      RADIOLOGY & ADDITIONAL TESTS:      ACC: [id number] EXAM:  US DPLX LWR EXT VEINS LTD RT   ORDERED BY: AVA BOOGIE     PROCEDURE DATE:  02/09/2024          INTERPRETATION:  CLINICAL INFORMATION: Right leg pain. Evaluate for DVT.    COMPARISON: None available.    TECHNIQUE: Duplex sonography of the RIGHT LOWER extremity veins with   color and spectral Doppler, with and without compression.    FINDINGS:    There is normal compressibility of the right common femoral, femoral and   popliteal veins.  The contralateral common femoral vein is patent.  Doppler examination shows normal spontaneous and phasic flow.    No calf vein thrombosis is detected. Please note that the gastrocnemius   and soleal veins are not visualized, precluding assessment.    IMPRESSION:  No evidence of right lower extremity deep venous thrombosis. See   described limitations above.      ACC: [id number] EXAM:  US DPLX CAROTIDS COMPL BI   ORDERED BY: ALFREDO CARLTON     PROCEDURE DATE:  02/06/2024          INTERPRETATION:  CLINICAL INFORMATION: Confirm a high grade carotid   artery stenosis identified on a maxillofacial CT examination, dated   2/4/2024    COMPARISON: None available.    TECHNIQUE: Grayscale, color and spectral Doppler examination of both   carotid arteries was performed.    FINDINGS:    Prominent calcified atheromatous plaques impeding antegrade flow are   present bilaterally in the right and left internal carotid arteries in   the regions of the bifurcations.    Peak systolic velocities are as follows:    RIGHT:  PROX CCA = 94.82  DIST CCA = 105.01  PROX ICA = 263/30  DIST ICA = 136.92  ECA = 318    LEFT:  PROX CCA = 113.88  DIST CCA = 123.20  PROX ICA = 607/160  DIST ICA = 151.21  ECA = 164.87    There is retrograde flow in the right vertebral artery.    There is antegrade flow in the left vertebral artery.    IMPRESSION:    There are high grade, greater than 70% stenoses of both the right and   left internal carotid arteries.    There are flow-limiting stenoses of the right and left external carotid   arteries.    There is retrograde flow in the right vertebral artery.    A dedicated CTA or MRA of the great vessels arising from the arch is   recommended to confirm the high grade stenoses of the right and left   internal carotid arteries and to investigate the retrograde flow in the   right vertebral artery with possible steal phenomenon.    ACC: [id number] EXAM:  US KIDNEYS AND BLADDER   ORDERED BY: ALLI HAYES     PROCEDURE DATE:  02/04/2024          INTERPRETATION:  CLINICAL INFORMATION: Evaluate for hydro-    COMPARISON: 7/30/2018    TECHNIQUE: Sonography of the kidneys and bladder.    FINDINGS:  Right kidney: 13.1 cm. upper pole simple appearing cyst measuring 3.6 x   3.2 x 4.4 cm and severe hydronephrosis    Left kidney: 12.4 cm. severe hydronephrosis    Urinary bladder: Only the right ureteral jets were seen. Prevoid volume   is 1871 cc    IMPRESSION:  Right kidney exophytic cyst appears simple. Bilateral severe   hydronephrosis. Only a right ureteral jet was seen. Large prevoid volume        ACC: [id number] EXAM:  CT MAXILLOFACIAL IC   ORDERED BY: ILSA MIRANDA     PROCEDURE DATE:  02/04/2024          INTERPRETATION:  History: Swelling of the left face, question facial   abscess near the nose.  73-year-old with past medical history for   diabetes, hypertension, polio presents emergency department with left   facial swelling. Patient started with symptoms approximately 3 days ago.   Patient noticed the painful area left nose. Now patient with more   swelling left side of face. Patient did take some amoxicillin at home   which helped but did not take last night and now swelling is worse today.    Description: A postcontrast CT scan of the maxillofacial region was   performed.    No prior maxillofacial imaging study was available for comparison at this   Medical Center.    Axial images with coronal and sagittal reformatted series were obtained.    90 cc intravenous Omnipaque 350 contrast was administered.    Extensive nasal and perinasal soft tissue swelling is noted. Soft tissue   swelling also involves the left preseptal periorbital soft tissues,   without evidence for post septal extension at this time. This most likely   reflects a soft tissue infectious phlegmon-cellulitis, without focal   abscess collection at this time.    Both maxillary sinuses are severely opacified. The secretions demonstrate   increased density on the right, which may reflect chronicity-inspissation   versus chronic fungal sinus disease. The walls of the maxillary sinuses   are mildly thickened suggesting a chronic sinusitis component. Mild   mucosal thickening involves the ethmoid sinuses. The sphenoid sinus is   overall well aerated.    Bilateral nasal bone deformities are noted which may reflect old   fractures (there is no submitted clinicalhistory of recent facial   trauma).    A high-grade stenosis involves the origin of the left internal carotid   artery, likely measuring greater than 80% via NASCET criteria. Calcified   plaque and moderate stenosis involves the origin of the right internal   carotid artery. Correlation with carotid ultrasound is recommended.    Impacted horizontally oriented wisdom teeth involve the bilateral   maxilla-floor of the maxillary sinuses.    IMPRESSION:    Extensive nasal and perinasal soft tissue swelling is noted. Soft tissue   swelling also involves the left preseptal periorbital soft tissues,   without evidence for post septal extension at this time. This most likely   reflects a soft tissue infectious phlegmon-cellulitis, without focal   abscess collection at this time.    Both maxillary sinuses are severely opacified. The secretions demonstrate   increased density on the right, which may reflect chronicity-inspissation   versus chronic fungal sinus disease. The walls of the maxillary sinuses   are mildly thickened suggesting a chronic sinusitis component.      ACC: [id number] EXAM:  CT ABDOMEN AND PELVIS   ORDERED BY: ILSA MIRANDA     PROCEDURE DATE:  02/04/2024          INTERPRETATION:  CLINICAL INFORMATION: Elevated creatinine, urinary   retention    COMPARISON: 7/26/2018    CONTRAST/COMPLICATIONS:  IV Contrast: NONE  Oral Contrast: NONE  Complications: None reported at time of study completion    PROCEDURE:  CT of the Abdomen and Pelvis was performed.  Sagittal and coronal reformats were performed.    FINDINGS:  LOWER CHEST: Within normal limits.    LIVER: Within normal limits.  BILE DUCTS: Normal caliber.  GALLBLADDER: Cholelithiasis.  SPLEEN: Within normal limits.  PANCREAS: Within normal limits.  ADRENALS: Within normal limits.  KIDNEYS/URETERS: Marked bilateral hydronephrosis, as noted previously, to   the level of the bladder, increased in extent since the previous study   with increased distention and dilatation of the ureters. Increased size   of slightly hyperattenuating lesion from the upper pole of the right   kidney, measuring 3.8 cm, compared to 1.6 cm previously. Smaller,   bilateral simple cysts.    BLADDER: Distended with trabeculations  REPRODUCTIVE ORGANS: Enlarged prostate gland with seeds in place    BOWEL: No bowel obstruction.  PERITONEUM: No ascites.  VESSELS: Within normal limits.  RETROPERITONEUM/LYMPH NODES: No lymphadenopathy.  ABDOMINAL WALL: Within normal limits.  BONES: Degenerative changes. Bilateral marked muscular atrophy in the hip   joints.    IMPRESSION:  1.  Marked bilateral hydronephrosis to the level of the bladder,   increased in severity and extent since the previous study.  2.  Increased size of a hyperattenuating exophytic right renal lesion,   possible hyper proteinaceous cyst. Would recommend elective repeat   ultrasound.        ACC: [id number] EXAM:  XR FOOT 2 VIEWS RT   ORDERED BY: AVA BOOGIE     PROCEDURE DATE:  02/09/2024          INTERPRETATION:  INDICATION: Right heel pain and erythema. Rule out   fracture or infection    TECHNIQUE: 3 views of the right foot    COMPARISON: None.    FINDINGS: Surgical hardware is present in the region of the talocalcaneal   joint. There is bony fusion/syndesmosis deformity of most of the   hindfoot/distal tibia and fibula. No fracture is seen. No cortical   irregularity or erosion is seen to suggest osteomyelitis. Vascular   calcifications are noted. No subcutaneous tracking gas is seen.    IMPRESSION: No fracture or radiographic evidence of infection visualized   in the right foot with attention to the right heel.

## 2024-02-18 NOTE — PROVIDER CONTACT NOTE (OTHER) - ACTION/TREATMENT ORDERED:
educated on importance of medication compliance, risk factors associated with HTN. patient states his blood pressure is always that high and he doesn't like to take b/p meds. MD made aware pt refusing
Blood work in the morning

## 2024-02-18 NOTE — PROGRESS NOTE ADULT - ASSESSMENT
73 M with Hx of DM2, HTN, polio with chronic LE paralysis, CKD IV with baseline Cr around 4, BPH, pulmonary HTN was admitted with L facial swelling and left periorbital cellulitis.  He underwent attempt for suprapubic catheter placement for hydronephrosis and SONDRA/CKD.  Hospital course complicated by intraoperative rectal injury, now status post diverting ostomy.    1. L Periorbital cellulitis, clinically improved   -  clinically on ABXs, S/p Zosyn, now on  Meropenem (day #7) completed  and Doxy (day #10) will complete Tx   -  blood cultures from 2/4 are negative  -  CT reports extensive nasal and perinasal soft tissue swelling. +soft tissue swelling involves L pre-septal periorbital soft tissues without evidence for post septal extension.   -  CT also reports that both maxillary sinuses are severely opacified w/ possible inspissation vs chronic fungal disease + chronic sinusitis  - Pt will need to f/u with ENT outPt (  discussed with Pt but hesitant)     2. High grade Bilateral internal carotid artery stenoses  -  s/p neuro and vascular consults  -  patient has refused MRI/MRA brain  -  continue ASA and statin  -  vascular followup with Dr. Leila Orona upon discharge     3.  SONDRA due to B/L hydronephrosis + Exophytic Renal lesion + Suspected SONDRA on CKD stage IV. Hyperphosphatemia   -  CT reports marked B/L hydronephrosis increased in severity and extent, increased size of a hyperattenuating exophytic right renal lesion 3.8cm  - s/p attempt for suprapubic catheter placement, complicated by bowel injury requiring procedure to be aborted and now s/p colostomy  - S/p SPT placement. Hematuria  -  monitor urine output and renal function  -  avoid NSAIDs and nephrotoxic meds  -  continue Sevalemer  -  will eventually need a fistula as he is high risk for progression to ESRD    4. Complicated MDRO  Pseudomonas UTI due to obstructive   uropathy   -  maintain isolation   -  Completed Meropenem  7 days course     5. Urinary obstruction   -  s/p attempt for suprapubic catheter placement, complicated by bowel injury requiring procedure to be aborted and now s/p colostomy  - S/p SPT placement on 2/14/24, mcconnell removed   - Follow closely due to hematuria, follow HGB    6.  Acute Rectal Injury, s/p diverting colostomy  -  wound care per colorectal  -  colostomy care and teaching done by Rakan Padilla RN     7. Hypertension  -  BP  improved  -C/w  Norvasc 10mg daily    8. R heel pain   likely  Muscle spasm, responded to muscle relaxants   Off load heels, turn and position Q 3H  bed with air matrass   did not tolerate  boot   off gabapentin a per Pt is not effective   Dilaudid changed to PO Oxy PRN  Tylenol PRN   now with calf pain , refusing DVT PPX, will check dopplers to r/o DVT      9. Cholelithiasis  -  asymptomatic, f/u outpatient     10. H/o Type 2 Diabetes Mellitus  HB A1c 4.9   regular diet     11.  Metabolic Acidosis  - due to advanced CKD, stable  -  continue sodium bicarbonate tabs    12. Anemia of Chronic Disease  -  due to advanced CKD,     13. Hx of Polio  -  has chronic paraplegia  -  frequent repositioning and out of bed to chair to minimize pressure ulcer injuries      DVT prophylaxis  -  venodynes, ordered SQ Heparin, but patient is reluctant to take it, he has been counseled on the risk of developing a DVT/PE while he is bedbound    Dispo:  Home once hematuria resolved

## 2024-02-19 LAB
ANION GAP SERPL CALC-SCNC: 5 MMOL/L — SIGNIFICANT CHANGE UP (ref 5–17)
BLD GP AB SCN SERPL QL: SIGNIFICANT CHANGE UP
BUN SERPL-MCNC: 69 MG/DL — HIGH (ref 7–23)
CALCIUM SERPL-MCNC: 7.6 MG/DL — LOW (ref 8.5–10.1)
CHLORIDE SERPL-SCNC: 102 MMOL/L — SIGNIFICANT CHANGE UP (ref 96–108)
CO2 SERPL-SCNC: 28 MMOL/L — SIGNIFICANT CHANGE UP (ref 22–31)
CREAT SERPL-MCNC: 3.24 MG/DL — HIGH (ref 0.5–1.3)
EGFR: 20 ML/MIN/1.73M2 — LOW
GLUCOSE SERPL-MCNC: 94 MG/DL — SIGNIFICANT CHANGE UP (ref 70–99)
HCT VFR BLD CALC: 24 % — LOW (ref 39–50)
HGB BLD-MCNC: 7.6 G/DL — LOW (ref 13–17)
HGB BLD-MCNC: 7.7 G/DL — LOW (ref 13–17)
MCHC RBC-ENTMCNC: 28.8 PG — SIGNIFICANT CHANGE UP (ref 27–34)
MCHC RBC-ENTMCNC: 32.1 GM/DL — SIGNIFICANT CHANGE UP (ref 32–36)
MCV RBC AUTO: 89.9 FL — SIGNIFICANT CHANGE UP (ref 80–100)
PLATELET # BLD AUTO: 243 K/UL — SIGNIFICANT CHANGE UP (ref 150–400)
POTASSIUM SERPL-MCNC: 4.2 MMOL/L — SIGNIFICANT CHANGE UP (ref 3.5–5.3)
POTASSIUM SERPL-SCNC: 4.2 MMOL/L — SIGNIFICANT CHANGE UP (ref 3.5–5.3)
RBC # BLD: 2.67 M/UL — LOW (ref 4.2–5.8)
RBC # FLD: 12.3 % — SIGNIFICANT CHANGE UP (ref 10.3–14.5)
SODIUM SERPL-SCNC: 135 MMOL/L — SIGNIFICANT CHANGE UP (ref 135–145)
WBC # BLD: 8.8 K/UL — SIGNIFICANT CHANGE UP (ref 3.8–10.5)
WBC # FLD AUTO: 8.8 K/UL — SIGNIFICANT CHANGE UP (ref 3.8–10.5)

## 2024-02-19 PROCEDURE — 99232 SBSQ HOSP IP/OBS MODERATE 35: CPT

## 2024-02-19 RX ORDER — OXYCODONE HYDROCHLORIDE 5 MG/1
10 TABLET ORAL ONCE
Refills: 0 | Status: DISCONTINUED | OUTPATIENT
Start: 2024-02-19 | End: 2024-02-19

## 2024-02-19 RX ADMIN — Medication 650 MILLIGRAM(S): at 09:28

## 2024-02-19 RX ADMIN — OXYCODONE HYDROCHLORIDE 10 MILLIGRAM(S): 5 TABLET ORAL at 22:11

## 2024-02-19 RX ADMIN — CYCLOBENZAPRINE HYDROCHLORIDE 10 MILLIGRAM(S): 10 TABLET, FILM COATED ORAL at 22:12

## 2024-02-19 RX ADMIN — Medication 3 MILLIGRAM(S): at 22:12

## 2024-02-19 RX ADMIN — SEVELAMER CARBONATE 800 MILLIGRAM(S): 2400 POWDER, FOR SUSPENSION ORAL at 13:12

## 2024-02-19 RX ADMIN — PREGABALIN 1000 MICROGRAM(S): 225 CAPSULE ORAL at 09:27

## 2024-02-19 RX ADMIN — Medication 650 MILLIGRAM(S): at 22:11

## 2024-02-19 RX ADMIN — SEVELAMER CARBONATE 800 MILLIGRAM(S): 2400 POWDER, FOR SUSPENSION ORAL at 09:27

## 2024-02-19 RX ADMIN — Medication 81 MILLIGRAM(S): at 09:29

## 2024-02-19 RX ADMIN — AMLODIPINE BESYLATE 10 MILLIGRAM(S): 2.5 TABLET ORAL at 09:28

## 2024-02-19 RX ADMIN — Medication 100 MILLIGRAM(S): at 09:27

## 2024-02-19 RX ADMIN — ATORVASTATIN CALCIUM 40 MILLIGRAM(S): 80 TABLET, FILM COATED ORAL at 22:11

## 2024-02-19 RX ADMIN — Medication 1 MILLIGRAM(S): at 09:27

## 2024-02-19 NOTE — PROGRESS NOTE ADULT - SUBJECTIVE AND OBJECTIVE BOX
CC: L facial swelling.     HPI: 74 y/o M w/ PMH of DM2, HTN, polio with paraplegia,  CKD IV, BPH, pulmonary HTN, p/w L facial swelling. Swelling started 3 days PTA  and has gotten progressively worse. C/o pain as well. Patient also has discomfort in his nose. Patient took a dose of amoxicillin outpatient. No  fever, chills, cough, runny nose, sore throat. Patient stated  that he is suppose to have a suprapubic catheter placed soon.     INTERVAL HPI/ OVERNIGHT EVENTS:  Pt was seen and examined, s/p SPT placement yesterday, reports feels tired some bladder pain, also report R calf pain. But states that able to sleep at night and  " meds are working" POC discussed   02/16/24: Patient seen and examined. Hematuria resolved. No new issues.   02/17/24: Now with hematuria and dropped HGB to 8.2 from 9.3 yesterday  02/18/24: Hematuria improving. HGB down to 8.0 today.  02/19/24: Now with gross hematuria. Discussed with on call  Dr Rai. He will see the patient.       Vital Signs Last 24 Hrs  T(C): 36.3 (19 Feb 2024 07:36), Max: 36.9 (18 Feb 2024 23:08)  T(F): 97.3 (19 Feb 2024 07:36), Max: 98.4 (18 Feb 2024 23:08)  HR: 88 (19 Feb 2024 07:36) (85 - 88)  BP: 130/67 (19 Feb 2024 07:36) (130/67 - 137/65)  BP(mean): --  RR: 18 (19 Feb 2024 07:36) (17 - 18)  SpO2: 96% (19 Feb 2024 07:36) (96% - 97%)    Parameters below as of 19 Feb 2024 07:36  Patient On (Oxygen Delivery Method): room air            REVIEW OF SYSTEMS:  All other review of systems is negative unless indicated above.      PHYSICAL EXAM:  General: in no acute distress  Eyes: EOMI; conjunctiva and sclera clear,  L upper/lower lid   edema resolved,  no erythema or warmth   Head: Normocephalic; atraumatic  ENMT: No nasal discharge; airway clear  Neck: Supple; non tender; no masses  Respiratory: No wheezes, rales or rhonchi  Cardiovascular: Regular rate and rhythm. S1 and S2 Normal;   Gastrointestinal: Soft non-tender non-distended; Normal bowel sounds.  Ostomy in place   with small amount of  stool     Genitourinary: No  suprapubic  tenderness, Murillo in place   Extremities: Min  feet edema, no erythema or warmth, RLE, +   tenderness to palpation over calf   Neurological: Alert and oriented x3, speech is clear, + paraplegia   Musculoskeletal: Decreased  muscle tone LE b/l,  LLE everted   Psychiatric: Cooperative        LABS:                                           7.7    8.80  )-----------( 243      ( 19 Feb 2024 07:07 )             24.0     19 Feb 2024 07:07    135    |  102    |  69     ----------------------------<  94     4.2     |  28     |  3.24     Ca    7.6        19 Feb 2024 07:07          CAPILLARY BLOOD GLUCOSE            Urinalysis Basic - ( 19 Feb 2024 07:07 )    Color: x / Appearance: x / SG: x / pH: x  Gluc: 94 mg/dL / Ketone: x  / Bili: x / Urobili: x   Blood: x / Protein: x / Nitrite: x   Leuk Esterase: x / RBC: x / WBC x   Sq Epi: x / Non Sq Epi: x / Bacteria: x                MEDICATIONS  (STANDING):  amLODIPine   Tablet 10 milliGRAM(s) Oral daily  aspirin enteric coated 81 milliGRAM(s) Oral daily  atorvastatin 40 milliGRAM(s) Oral at bedtime  dextrose 5%. 1000 milliLiter(s) (50 mL/Hr) IV Continuous <Continuous>  dextrose 5%. 1000 milliLiter(s) (100 mL/Hr) IV Continuous <Continuous>  dextrose 50% Injectable 12.5 Gram(s) IV Push once  dextrose 50% Injectable 25 Gram(s) IV Push once  dextrose 50% Injectable 25 Gram(s) IV Push once  doxycycline IVPB 100 milliGRAM(s) IV Intermittent every 12 hours  glucagon  Injectable 1 milliGRAM(s) IntraMuscular once  heparin   Injectable 5000 Unit(s) SubCutaneous every 8 hours  meropenem Injectable 500 milliGRAM(s) IV Push every 12 hours  sevelamer carbonate 800 milliGRAM(s) Oral three times a day with meals  sodium bicarbonate 650 milliGRAM(s) Oral two times a day    MEDICATIONS  (PRN):  acetaminophen     Tablet .. 650 milliGRAM(s) Oral every 6 hours PRN Mild Pain (1 - 3)  benzonatate 100 milliGRAM(s) Oral every 8 hours PRN Cough  dextrose Oral Gel 15 Gram(s) Oral once PRN Blood Glucose LESS THAN 70 milliGRAM(s)/deciliter  hydrALAZINE Injectable 10 milliGRAM(s) IV Push every 6 hours PRN for systolic BP greater than 160  melatonin 3 milliGRAM(s) Oral at bedtime PRN Sleep  oxyCODONE    IR 5 milliGRAM(s) Oral every 6 hours PRN Moderate Pain (4 - 6)  oxyCODONE    IR 10 milliGRAM(s) Oral every 6 hours PRN Severe Pain (7 - 10)      RADIOLOGY & ADDITIONAL TESTS:      ACC: [id number] EXAM:  US DPLX LWR EXT VEINS LTD RT   ORDERED BY: VAA BOOGIE     PROCEDURE DATE:  02/09/2024          INTERPRETATION:  CLINICAL INFORMATION: Right leg pain. Evaluate for DVT.    COMPARISON: None available.    TECHNIQUE: Duplex sonography of the RIGHT LOWER extremity veins with   color and spectral Doppler, with and without compression.    FINDINGS:    There is normal compressibility of the right common femoral, femoral and   popliteal veins.  The contralateral common femoral vein is patent.  Doppler examination shows normal spontaneous and phasic flow.    No calf vein thrombosis is detected. Please note that the gastrocnemius   and soleal veins are not visualized, precluding assessment.    IMPRESSION:  No evidence of right lower extremity deep venous thrombosis. See   described limitations above.      ACC: [id number] EXAM:  US DPLX CAROTIDS COMPL BI   ORDERED BY: ALFREDO CARLTON     PROCEDURE DATE:  02/06/2024          INTERPRETATION:  CLINICAL INFORMATION: Confirm a high grade carotid   artery stenosis identified on a maxillofacial CT examination, dated   2/4/2024    COMPARISON: None available.    TECHNIQUE: Grayscale, color and spectral Doppler examination of both   carotid arteries was performed.    FINDINGS:    Prominent calcified atheromatous plaques impeding antegrade flow are   present bilaterally in the right and left internal carotid arteries in   the regions of the bifurcations.    Peak systolic velocities are as follows:    RIGHT:  PROX CCA = 94.82  DIST CCA = 105.01  PROX ICA = 263/30  DIST ICA = 136.92  ECA = 318    LEFT:  PROX CCA = 113.88  DIST CCA = 123.20  PROX ICA = 607/160  DIST ICA = 151.21  ECA = 164.87    There is retrograde flow in the right vertebral artery.    There is antegrade flow in the left vertebral artery.    IMPRESSION:    There are high grade, greater than 70% stenoses of both the right and   left internal carotid arteries.    There are flow-limiting stenoses of the right and left external carotid   arteries.    There is retrograde flow in the right vertebral artery.    A dedicated CTA or MRA of the great vessels arising from the arch is   recommended to confirm the high grade stenoses of the right and left   internal carotid arteries and to investigate the retrograde flow in the   right vertebral artery with possible steal phenomenon.    ACC: [id number] EXAM:  US KIDNEYS AND BLADDER   ORDERED BY: ALLI HAYES     PROCEDURE DATE:  02/04/2024          INTERPRETATION:  CLINICAL INFORMATION: Evaluate for hydro-    COMPARISON: 7/30/2018    TECHNIQUE: Sonography of the kidneys and bladder.    FINDINGS:  Right kidney: 13.1 cm. upper pole simple appearing cyst measuring 3.6 x   3.2 x 4.4 cm and severe hydronephrosis    Left kidney: 12.4 cm. severe hydronephrosis    Urinary bladder: Only the right ureteral jets were seen. Prevoid volume   is 1871 cc    IMPRESSION:  Right kidney exophytic cyst appears simple. Bilateral severe   hydronephrosis. Only a right ureteral jet was seen. Large prevoid volume        ACC: [id number] EXAM:  CT MAXILLOFACIAL IC   ORDERED BY: ILSA MIRANDA     PROCEDURE DATE:  02/04/2024          INTERPRETATION:  History: Swelling of the left face, question facial   abscess near the nose.  73-year-old with past medical history for   diabetes, hypertension, polio presents emergency department with left   facial swelling. Patient started with symptoms approximately 3 days ago.   Patient noticed the painful area left nose. Now patient with more   swelling left side of face. Patient did take some amoxicillin at home   which helped but did not take last night and now swelling is worse today.    Description: A postcontrast CT scan of the maxillofacial region was   performed.    No prior maxillofacial imaging study was available for comparison at this   Medical Center.    Axial images with coronal and sagittal reformatted series were obtained.    90 cc intravenous Omnipaque 350 contrast was administered.    Extensive nasal and perinasal soft tissue swelling is noted. Soft tissue   swelling also involves the left preseptal periorbital soft tissues,   without evidence for post septal extension at this time. This most likely   reflects a soft tissue infectious phlegmon-cellulitis, without focal   abscess collection at this time.    Both maxillary sinuses are severely opacified. The secretions demonstrate   increased density on the right, which may reflect chronicity-inspissation   versus chronic fungal sinus disease. The walls of the maxillary sinuses   are mildly thickened suggesting a chronic sinusitis component. Mild   mucosal thickening involves the ethmoid sinuses. The sphenoid sinus is   overall well aerated.    Bilateral nasal bone deformities are noted which may reflect old   fractures (there is no submitted clinicalhistory of recent facial   trauma).    A high-grade stenosis involves the origin of the left internal carotid   artery, likely measuring greater than 80% via NASCET criteria. Calcified   plaque and moderate stenosis involves the origin of the right internal   carotid artery. Correlation with carotid ultrasound is recommended.    Impacted horizontally oriented wisdom teeth involve the bilateral   maxilla-floor of the maxillary sinuses.    IMPRESSION:    Extensive nasal and perinasal soft tissue swelling is noted. Soft tissue   swelling also involves the left preseptal periorbital soft tissues,   without evidence for post septal extension at this time. This most likely   reflects a soft tissue infectious phlegmon-cellulitis, without focal   abscess collection at this time.    Both maxillary sinuses are severely opacified. The secretions demonstrate   increased density on the right, which may reflect chronicity-inspissation   versus chronic fungal sinus disease. The walls of the maxillary sinuses   are mildly thickened suggesting a chronic sinusitis component.      ACC: [id number] EXAM:  CT ABDOMEN AND PELVIS   ORDERED BY: ILSA MIRANDA     PROCEDURE DATE:  02/04/2024          INTERPRETATION:  CLINICAL INFORMATION: Elevated creatinine, urinary   retention    COMPARISON: 7/26/2018    CONTRAST/COMPLICATIONS:  IV Contrast: NONE  Oral Contrast: NONE  Complications: None reported at time of study completion    PROCEDURE:  CT of the Abdomen and Pelvis was performed.  Sagittal and coronal reformats were performed.    FINDINGS:  LOWER CHEST: Within normal limits.    LIVER: Within normal limits.  BILE DUCTS: Normal caliber.  GALLBLADDER: Cholelithiasis.  SPLEEN: Within normal limits.  PANCREAS: Within normal limits.  ADRENALS: Within normal limits.  KIDNEYS/URETERS: Marked bilateral hydronephrosis, as noted previously, to   the level of the bladder, increased in extent since the previous study   with increased distention and dilatation of the ureters. Increased size   of slightly hyperattenuating lesion from the upper pole of the right   kidney, measuring 3.8 cm, compared to 1.6 cm previously. Smaller,   bilateral simple cysts.    BLADDER: Distended with trabeculations  REPRODUCTIVE ORGANS: Enlarged prostate gland with seeds in place    BOWEL: No bowel obstruction.  PERITONEUM: No ascites.  VESSELS: Within normal limits.  RETROPERITONEUM/LYMPH NODES: No lymphadenopathy.  ABDOMINAL WALL: Within normal limits.  BONES: Degenerative changes. Bilateral marked muscular atrophy in the hip   joints.    IMPRESSION:  1.  Marked bilateral hydronephrosis to the level of the bladder,   increased in severity and extent since the previous study.  2.  Increased size of a hyperattenuating exophytic right renal lesion,   possible hyper proteinaceous cyst. Would recommend elective repeat   ultrasound.        ACC: [id number] EXAM:  XR FOOT 2 VIEWS RT   ORDERED BY: AVA BOOGIE     PROCEDURE DATE:  02/09/2024          INTERPRETATION:  INDICATION: Right heel pain and erythema. Rule out   fracture or infection    TECHNIQUE: 3 views of the right foot    COMPARISON: None.    FINDINGS: Surgical hardware is present in the region of the talocalcaneal   joint. There is bony fusion/syndesmosis deformity of most of the   hindfoot/distal tibia and fibula. No fracture is seen. No cortical   irregularity or erosion is seen to suggest osteomyelitis. Vascular   calcifications are noted. No subcutaneous tracking gas is seen.    IMPRESSION: No fracture or radiographic evidence of infection visualized   in the right foot with attention to the right heel.

## 2024-02-19 NOTE — PROVIDER CONTACT NOTE (OTHER) - SITUATION
dr rehman spoke to pt regarding PRBCs. patient refusing transfusion at this time. will have labs checked and re address in AM
notified Dr Mcdowell's office of admission please fax over discharge paperwork once the patient discharged please fax over is dishcarge paperwork to [phone]
pt requesting to speak to urology regarding Blood in catheter. awaiting a call back. nurse unable to answer questions at this time.
pt b/p 187/64 Dr Andino made aware ordered Amlodipine 5 mg patient refusing meds,
Office is aware that patient is in [facility].  Please fax discharge papers to [phone].
Pt noted with bright -red urine with clots from Suprapubic tube
nurse reaching out to hospitalist. not able to get in touch with urology on call. nurse unable to answer questions.

## 2024-02-19 NOTE — PROVIDER CONTACT NOTE (OTHER) - REASON
Bright-red urine
tranfusion
addison red Blood in suprabubic catheter
Dr Yevgeniy Mcdowell (PCP)
notified pcp
pt conerns about catheter
pt refusing B/P meds

## 2024-02-19 NOTE — PROVIDER CONTACT NOTE (OTHER) - DATE AND TIME:
05-Feb-2024 01:43
06-Feb-2024 10:43
19-Feb-2024 15:55
18-Feb-2024 17:39
19-Feb-2024 15:56
05-Feb-2024 13:56
19-Feb-2024 17:53

## 2024-02-19 NOTE — PROGRESS NOTE ADULT - ASSESSMENT
73 M with Hx of DM2, HTN, polio with chronic LE paralysis, CKD IV with baseline Cr around 4, BPH, pulmonary HTN was admitted with L facial swelling and left periorbital cellulitis.  He underwent attempt for suprapubic catheter placement for hydronephrosis and SONDRA/CKD.  Hospital course complicated by intraoperative rectal injury, now status post diverting ostomy.    1. L Periorbital cellulitis,  improved   -  clinically on ABXs, S/p Zosyn, now on  Meropenem (day #7) completed  and Doxy completed   -  blood cultures from 2/4 are negative  -  CT reports extensive nasal and perinasal soft tissue swelling. +soft tissue swelling involves L pre-septal periorbital soft tissues without evidence for post septal extension.   -  CT also reports that both maxillary sinuses are severely opacified w/ possible inspissation vs chronic fungal disease + chronic sinusitis  - Pt will need to f/u with ENT outPt (  discussed with Pt but hesitant)     2. High grade Bilateral internal carotid artery stenoses  -  s/p neuro and vascular consults  -  patient has refused MRI/MRA brain  -  continue ASA and statin  -  vascular followup with Dr. Leila Orona upon discharge     3.  SONDRA due to B/L hydronephrosis + Exophytic Renal lesion + Suspected SONDRA on CKD stage IV. Hyperphosphatemia   -  CT reports marked B/L hydronephrosis increased in severity and extent, increased size of a hyperattenuating exophytic right renal lesion 3.8cm  - s/p attempt for suprapubic catheter placement, complicated by bowel injury requiring procedure to be aborted and now s/p colostomy  - S/p SPT placement. Hematuria. Discussed with Dr Rai, he will see him today  -  monitor urine output and renal function  -  avoid NSAIDs and nephrotoxic meds  -  continue Sevalemer  -  will eventually need a fistula as he is high risk for progression to ESRD    4. Complicated MDRO  Pseudomonas UTI due to obstructive   uropathy   -  maintain isolation   -  Completed Meropenem  7 days course     5. Urinary obstruction   -  s/p attempt for suprapubic catheter placement, complicated by bowel injury requiring procedure to be aborted and now s/p colostomy  - S/p SPT placement on 2/14/24, mcconnell removed   - Follow closely due to hematuria, follow HGB    6.  Acute Rectal Injury, s/p diverting colostomy  -  wound care per colorectal  -  colostomy care and teaching done by Rakan Padilla, RN     7. Hypertension  -  BP  improved  -C/w  Norvasc 10mg daily    8. R heel pain   likely  Muscle spasm, responded to muscle relaxants   Off load heels, turn and position Q 3H  bed with air matrass   did not tolerate  boot   off gabapentin a per Pt is not effective   Dilaudid changed to PO Oxy PRN  Tylenol PRN   now with calf pain , refusing DVT PPX, will check dopplers to r/o DVT      9. Cholelithiasis  -  asymptomatic, f/u outpatient     10. H/o Type 2 Diabetes Mellitus  HB A1c 4.9   regular diet     11.  Metabolic Acidosis  - due to advanced CKD, stable  -  continue sodium bicarbonate tabs    12. Anemia of Chronic Disease  -  due to advanced CKD,     13. Hx of Polio  -  has chronic paraplegia  -  frequent repositioning and out of bed to chair to minimize pressure ulcer injuries      DVT prophylaxis  -  venodynes, ordered SQ Heparin, but patient is reluctant to take it, he has been counseled on the risk of developing a DVT/PE while he is bedbound    Dispo:  Home once hematuria resolved

## 2024-02-19 NOTE — PROGRESS NOTE ADULT - ASSESSMENT
A/P: 73y Male s/p SPT placement  Persistent Hematuria  Discussed mcconnell placement with possible CBI. Patient refused. He doesn't want any more catheter.   I explained CBI is needed to attempt clearing hematuria. He is frustrated of this situation.   I explained if CBI doesn't clear hematuria then we may need to revisit OR.   Currently patient is refusing recommendation.   advised proper hydration. will f/u tomorrow

## 2024-02-19 NOTE — PROGRESS NOTE ADULT - SUBJECTIVE AND OBJECTIVE BOX
Urology reconsulted for persistent hematuria    Pt resting in bed. Denies any pain.   SPT in place. He is frustrated hematuria is not resolving    Vital Signs Last 24 Hrs  T(C): 36.4 (16 Feb 2024 08:14), Max: 37.1 (15 Feb 2024 22:15)  T(F): 97.5 (16 Feb 2024 08:14), Max: 98.7 (15 Feb 2024 22:15)  HR: 84 (16 Feb 2024 08:14) (84 - 86)  BP: 126/72 (16 Feb 2024 08:14) (126/72 - 147/61)  BP(mean): --  RR: 18 (16 Feb 2024 08:14) (18 - 18)  SpO2: 97% (16 Feb 2024 08:14) (95% - 98%)    Parameters below as of 16 Feb 2024 08:14  Patient On (Oxygen Delivery Method): room air        I&O's Summary    15 Feb 2024 07:01  -  16 Feb 2024 07:00  --------------------------------------------------------  IN: 420 mL / OUT: 1800 mL / NET: -1380 mL        Physical Exam  Gen: NAD, A&Ox3  Abd: soft, NT.  SPT in place draining addison red bloody urine  Colostomy draining well                                      7.7    8.80  )-----------( 243      ( 19 Feb 2024 07:07 )             24.0

## 2024-02-20 DIAGNOSIS — R31.0 GROSS HEMATURIA: ICD-10-CM

## 2024-02-20 LAB
ANION GAP SERPL CALC-SCNC: 5 MMOL/L — SIGNIFICANT CHANGE UP (ref 5–17)
BUN SERPL-MCNC: 66 MG/DL — HIGH (ref 7–23)
CALCIUM SERPL-MCNC: 7.9 MG/DL — LOW (ref 8.5–10.1)
CHLORIDE SERPL-SCNC: 102 MMOL/L — SIGNIFICANT CHANGE UP (ref 96–108)
CO2 SERPL-SCNC: 29 MMOL/L — SIGNIFICANT CHANGE UP (ref 22–31)
CREAT SERPL-MCNC: 3.45 MG/DL — HIGH (ref 0.5–1.3)
EGFR: 18 ML/MIN/1.73M2 — LOW
GLUCOSE SERPL-MCNC: 86 MG/DL — SIGNIFICANT CHANGE UP (ref 70–99)
HCT VFR BLD CALC: 23.4 % — LOW (ref 39–50)
HGB BLD-MCNC: 7.4 G/DL — LOW (ref 13–17)
MCHC RBC-ENTMCNC: 28.6 PG — SIGNIFICANT CHANGE UP (ref 27–34)
MCHC RBC-ENTMCNC: 31.6 GM/DL — LOW (ref 32–36)
MCV RBC AUTO: 90.3 FL — SIGNIFICANT CHANGE UP (ref 80–100)
PLATELET # BLD AUTO: 266 K/UL — SIGNIFICANT CHANGE UP (ref 150–400)
POTASSIUM SERPL-MCNC: 4.2 MMOL/L — SIGNIFICANT CHANGE UP (ref 3.5–5.3)
POTASSIUM SERPL-SCNC: 4.2 MMOL/L — SIGNIFICANT CHANGE UP (ref 3.5–5.3)
RBC # BLD: 2.59 M/UL — LOW (ref 4.2–5.8)
RBC # FLD: 12.2 % — SIGNIFICANT CHANGE UP (ref 10.3–14.5)
SODIUM SERPL-SCNC: 136 MMOL/L — SIGNIFICANT CHANGE UP (ref 135–145)
WBC # BLD: 8.97 K/UL — SIGNIFICANT CHANGE UP (ref 3.8–10.5)
WBC # FLD AUTO: 8.97 K/UL — SIGNIFICANT CHANGE UP (ref 3.8–10.5)

## 2024-02-20 PROCEDURE — 76770 US EXAM ABDO BACK WALL COMP: CPT | Mod: 26

## 2024-02-20 PROCEDURE — 99232 SBSQ HOSP IP/OBS MODERATE 35: CPT

## 2024-02-20 PROCEDURE — 99233 SBSQ HOSP IP/OBS HIGH 50: CPT

## 2024-02-20 RX ORDER — OXYCODONE HYDROCHLORIDE 5 MG/1
10 TABLET ORAL EVERY 6 HOURS
Refills: 0 | Status: DISCONTINUED | OUTPATIENT
Start: 2024-02-20 | End: 2024-02-22

## 2024-02-20 RX ADMIN — AMLODIPINE BESYLATE 10 MILLIGRAM(S): 2.5 TABLET ORAL at 10:17

## 2024-02-20 RX ADMIN — Medication 650 MILLIGRAM(S): at 10:18

## 2024-02-20 RX ADMIN — SEVELAMER CARBONATE 800 MILLIGRAM(S): 2400 POWDER, FOR SUSPENSION ORAL at 12:00

## 2024-02-20 RX ADMIN — CYCLOBENZAPRINE HYDROCHLORIDE 10 MILLIGRAM(S): 10 TABLET, FILM COATED ORAL at 21:12

## 2024-02-20 RX ADMIN — Medication 650 MILLIGRAM(S): at 21:12

## 2024-02-20 RX ADMIN — OXYCODONE HYDROCHLORIDE 10 MILLIGRAM(S): 5 TABLET ORAL at 21:12

## 2024-02-20 RX ADMIN — PREGABALIN 1000 MICROGRAM(S): 225 CAPSULE ORAL at 10:18

## 2024-02-20 RX ADMIN — Medication 1 MILLIGRAM(S): at 10:17

## 2024-02-20 RX ADMIN — Medication 3 MILLIGRAM(S): at 21:12

## 2024-02-20 RX ADMIN — SEVELAMER CARBONATE 800 MILLIGRAM(S): 2400 POWDER, FOR SUSPENSION ORAL at 10:18

## 2024-02-20 RX ADMIN — ATORVASTATIN CALCIUM 40 MILLIGRAM(S): 80 TABLET, FILM COATED ORAL at 21:12

## 2024-02-20 NOTE — PROGRESS NOTE ADULT - ASSESSMENT
Hematuria continued through weekend with on and off clots.   H and H now stable with Cr.   Patient refused transfusion and intervention with CBI.     Discussed can have on and off hematuria with Suprapubic catheter.   Discussed treatment options.   Mentioned will get Renal and Bladder ultrasound to assess clot burden.   Discussed possibility of Cystoscopy, clot evacuation and fulguration depending on result.   Demonstrated wife how to flush Suprapubic catheter.   Mentioned will send home with pre filled NS syringes.   Will try and arrange VNS for Suprapubic catheter care.

## 2024-02-20 NOTE — PROGRESS NOTE ADULT - ASSESSMENT
73 M with Hx of DM2, HTN, polio with chronic LE paralysis, CKD IV with baseline Cr around 4, BPH, pulmonary HTN was admitted with L facial swelling and left periorbital cellulitis.  He underwent attempt for suprapubic catheter placement for hydronephrosis and SONDRA/CKD.  Hospital course complicated by intraoperative rectal injury, now status post diverting ostomy.    1. L Periorbital cellulitis,  improved   -  clinically on ABXs, S/p Zosyn, now on  Meropenem (day #7) completed  and Doxy completed   -  blood cultures from 2/4 are negative  -  CT reports extensive nasal and perinasal soft tissue swelling. +soft tissue swelling involves L pre-septal periorbital soft tissues without evidence for post septal extension.   -  CT also reports that both maxillary sinuses are severely opacified w/ possible inspissation vs chronic fungal disease + chronic sinusitis  - Pt will need to f/u with ENT outPt (  discussed with Pt but hesitant)     2. High grade Bilateral internal carotid artery stenoses  -  s/p neuro and vascular consults  -  patient has refused MRI/MRA brain  -  continue ASA and statin  -  vascular followup with Dr. Leila Orona upon discharge     3.  SONDRA due to B/L hydronephrosis + Exophytic Renal lesion + Suspected SONDRA on CKD stage IV. Hyperphosphatemia   -  CT reports marked B/L hydronephrosis increased in severity and extent, increased size of a hyperattenuating exophytic right renal lesion 3.8cm  - s/p attempt for suprapubic catheter placement, complicated by bowel injury requiring procedure to be aborted and now s/p colostomy  - S/p SPT placement. Hematuria. Discussed with Dr Rai, he will see him today  -  monitor urine output and renal function  -  avoid NSAIDs and nephrotoxic meds  -  continue Sevalemer  -  will eventually need a fistula as he is high risk for progression to ESRD    4. Complicated MDRO  Pseudomonas UTI due to obstructive   uropathy   -  maintain isolation   -  Completed Meropenem  7 days course     5. Urinary obstruction   Hematuria  -  s/p attempt for suprapubic catheter placement, complicated by bowel injury requiring procedure to be aborted and now s/p colostomy  - S/p SPT placement on 2/14/24, mcconnell removed   - Follow closely due to hematuria, follow HGB  - follows    6.  Acute Rectal Injury, s/p diverting colostomy  -  wound care per colorectal  -  colostomy care and teaching done by Rakan Padilla, RN     7. Hypertension  -  BP  improved  -C/w  Norvasc 10mg daily    8. R heel pain   likely  Muscle spasm, responded to muscle relaxants   Off load heels, turn and position Q 3H  bed with air matrass   did not tolerate  boot   off gabapentin a per Pt is not effective   Dilaudid changed to PO Oxy PRN     9. Cholelithiasis  -  asymptomatic, f/u outpatient     10. H/o Type 2 Diabetes Mellitus  HB A1c 4.9   regular diet     11.  Metabolic Acidosis  - due to advanced CKD, stable  -  continue sodium bicarbonate tabs    12. Acute on chr Anemia of Chronic Disease  Acute blood loss anemia due to hematuria  Follow  -  due to advanced CKD,     13. Hx of Polio  -  has chronic paraplegia  -  frequent repositioning and out of bed to chair to minimize pressure ulcer injuries      DVT prophylaxis  -  venodynes, ordered SQ Heparin, but patient is reluctant to take it, he has been counseled on the risk of developing a DVT/PE while he is bedbound    Dispo:  Home once hematuria resolved

## 2024-02-20 NOTE — PROGRESS NOTE ADULT - SUBJECTIVE AND OBJECTIVE BOX
CC: L facial swelling.     HPI: 74 y/o M w/ PMH of DM2, HTN, polio with paraplegia,  CKD IV, BPH, pulmonary HTN, p/w L facial swelling. Swelling started 3 days PTA  and has gotten progressively worse. C/o pain as well. Patient also has discomfort in his nose. Patient took a dose of amoxicillin outpatient. No  fever, chills, cough, runny nose, sore throat. Patient stated  that he is suppose to have a suprapubic catheter placed soon.     INTERVAL HPI/ OVERNIGHT EVENTS:  Pt was seen and examined, s/p SPT placement yesterday, reports feels tired some bladder pain, also report R calf pain. But states that able to sleep at night and  " meds are working" POC discussed   02/16/24: Patient seen and examined. Hematuria resolved. No new issues.   02/17/24: Now with hematuria and dropped HGB to 8.2 from 9.3 yesterday  02/18/24: Hematuria improving. HGB down to 8.0 today.  02/19/24: Now with gross hematuria. Discussed with on call BETTY Rai. He will see the patient.   02/20/24: Still with hematuria. Patient refusing CBI. Patient also wants to hold the blood transfusion for now. Discussed with on call BETTY Rai yesterday and also BETTY Christianson today. Discussed with wife today in length regarding management plan.         Vital Signs Last 24 Hrs  T(C): 36.7 (20 Feb 2024 07:54), Max: 36.9 (19 Feb 2024 15:49)  T(F): 98.1 (20 Feb 2024 07:54), Max: 98.4 (19 Feb 2024 15:49)  HR: 85 (20 Feb 2024 07:54) (85 - 88)  BP: 129/57 (20 Feb 2024 07:54) (129/57 - 135/66)  BP(mean): --  RR: 18 (20 Feb 2024 07:54) (18 - 18)  SpO2: 96% (20 Feb 2024 07:54) (96% - 98%)    Parameters below as of 20 Feb 2024 07:54  Patient On (Oxygen Delivery Method): room air            REVIEW OF SYSTEMS:  All other review of systems is negative unless indicated above.      PHYSICAL EXAM:  General: in no acute distress  Eyes: EOMI; conjunctiva and sclera clear,  L upper/lower lid   edema resolved,  no erythema or warmth   Head: Normocephalic; atraumatic  ENMT: No nasal discharge; airway clear  Neck: Supple; non tender; no masses  Respiratory: No wheezes, rales or rhonchi  Cardiovascular: Regular rate and rhythm. S1 and S2 Normal;   Gastrointestinal: Soft non-tender non-distended; Normal bowel sounds.  Ostomy in place   with small amount of  stool     Genitourinary: No  suprapubic  tenderness, Murillo in place   Extremities: Min  feet edema, no erythema or warmth, RLE, +   tenderness to palpation over calf   Neurological: Alert and oriented x3, speech is clear, + paraplegia   Musculoskeletal: Decreased  muscle tone LE b/l,  LLE everted   Psychiatric: Cooperative        LABS:                                                      7.4    8.97  )-----------( 266      ( 20 Feb 2024 07:00 )             23.4     20 Feb 2024 07:00    136    |  102    |  66     ----------------------------<  86     4.2     |  29     |  3.45     Ca    7.9        20 Feb 2024 07:00          CAPILLARY BLOOD GLUCOSE            Urinalysis Basic - ( 20 Feb 2024 07:00 )    Color: x / Appearance: x / SG: x / pH: x  Gluc: 86 mg/dL / Ketone: x  / Bili: x / Urobili: x   Blood: x / Protein: x / Nitrite: x   Leuk Esterase: x / RBC: x / WBC x   Sq Epi: x / Non Sq Epi: x / Bacteria: x            MEDICATIONS  (STANDING):  amLODIPine   Tablet 10 milliGRAM(s) Oral daily  aspirin enteric coated 81 milliGRAM(s) Oral daily  atorvastatin 40 milliGRAM(s) Oral at bedtime  dextrose 5%. 1000 milliLiter(s) (50 mL/Hr) IV Continuous <Continuous>  dextrose 5%. 1000 milliLiter(s) (100 mL/Hr) IV Continuous <Continuous>  dextrose 50% Injectable 12.5 Gram(s) IV Push once  dextrose 50% Injectable 25 Gram(s) IV Push once  dextrose 50% Injectable 25 Gram(s) IV Push once  doxycycline IVPB 100 milliGRAM(s) IV Intermittent every 12 hours  glucagon  Injectable 1 milliGRAM(s) IntraMuscular once  heparin   Injectable 5000 Unit(s) SubCutaneous every 8 hours  meropenem Injectable 500 milliGRAM(s) IV Push every 12 hours  sevelamer carbonate 800 milliGRAM(s) Oral three times a day with meals  sodium bicarbonate 650 milliGRAM(s) Oral two times a day    MEDICATIONS  (PRN):  acetaminophen     Tablet .. 650 milliGRAM(s) Oral every 6 hours PRN Mild Pain (1 - 3)  benzonatate 100 milliGRAM(s) Oral every 8 hours PRN Cough  dextrose Oral Gel 15 Gram(s) Oral once PRN Blood Glucose LESS THAN 70 milliGRAM(s)/deciliter  hydrALAZINE Injectable 10 milliGRAM(s) IV Push every 6 hours PRN for systolic BP greater than 160  melatonin 3 milliGRAM(s) Oral at bedtime PRN Sleep  oxyCODONE    IR 5 milliGRAM(s) Oral every 6 hours PRN Moderate Pain (4 - 6)  oxyCODONE    IR 10 milliGRAM(s) Oral every 6 hours PRN Severe Pain (7 - 10)      RADIOLOGY & ADDITIONAL TESTS:      ACC: [id number] EXAM:  US DPLX LWR EXT VEINS LTD RT   ORDERED BY: AVA BOOGIE     PROCEDURE DATE:  02/09/2024          INTERPRETATION:  CLINICAL INFORMATION: Right leg pain. Evaluate for DVT.    COMPARISON: None available.    TECHNIQUE: Duplex sonography of the RIGHT LOWER extremity veins with   color and spectral Doppler, with and without compression.    FINDINGS:    There is normal compressibility of the right common femoral, femoral and   popliteal veins.  The contralateral common femoral vein is patent.  Doppler examination shows normal spontaneous and phasic flow.    No calf vein thrombosis is detected. Please note that the gastrocnemius   and soleal veins are not visualized, precluding assessment.    IMPRESSION:  No evidence of right lower extremity deep venous thrombosis. See   described limitations above.      ACC: [id number] EXAM:  US DPLX CAROTIDS COMPL BI   ORDERED BY: ALFREDO CARLTON     PROCEDURE DATE:  02/06/2024          INTERPRETATION:  CLINICAL INFORMATION: Confirm a high grade carotid   artery stenosis identified on a maxillofacial CT examination, dated   2/4/2024    COMPARISON: None available.    TECHNIQUE: Grayscale, color and spectral Doppler examination of both   carotid arteries was performed.    FINDINGS:    Prominent calcified atheromatous plaques impeding antegrade flow are   present bilaterally in the right and left internal carotid arteries in   the regions of the bifurcations.    Peak systolic velocities are as follows:    RIGHT:  PROX CCA = 94.82  DIST CCA = 105.01  PROX ICA = 263/30  DIST ICA = 136.92  ECA = 318    LEFT:  PROX CCA = 113.88  DIST CCA = 123.20  PROX ICA = 607/160  DIST ICA = 151.21  ECA = 164.87    There is retrograde flow in the right vertebral artery.    There is antegrade flow in the left vertebral artery.    IMPRESSION:    There are high grade, greater than 70% stenoses of both the right and   left internal carotid arteries.    There are flow-limiting stenoses of the right and left external carotid   arteries.    There is retrograde flow in the right vertebral artery.    A dedicated CTA or MRA of the great vessels arising from the arch is   recommended to confirm the high grade stenoses of the right and left   internal carotid arteries and to investigate the retrograde flow in the   right vertebral artery with possible steal phenomenon.    ACC: [id number] EXAM:  US KIDNEYS AND BLADDER   ORDERED BY: ALIL HAYES     PROCEDURE DATE:  02/04/2024          INTERPRETATION:  CLINICAL INFORMATION: Evaluate for hydro-    COMPARISON: 7/30/2018    TECHNIQUE: Sonography of the kidneys and bladder.    FINDINGS:  Right kidney: 13.1 cm. upper pole simple appearing cyst measuring 3.6 x   3.2 x 4.4 cm and severe hydronephrosis    Left kidney: 12.4 cm. severe hydronephrosis    Urinary bladder: Only the right ureteral jets were seen. Prevoid volume   is 1871 cc    IMPRESSION:  Right kidney exophytic cyst appears simple. Bilateral severe   hydronephrosis. Only a right ureteral jet was seen. Large prevoid volume        ACC: [id number] EXAM:  CT MAXILLOFACIAL IC   ORDERED BY: ILSA MIRANDA     PROCEDURE DATE:  02/04/2024          INTERPRETATION:  History: Swelling of the left face, question facial   abscess near the nose.  73-year-old with past medical history for   diabetes, hypertension, polio presents emergency department with left   facial swelling. Patient started with symptoms approximately 3 days ago.   Patient noticed the painful area left nose. Now patient with more   swelling left side of face. Patient did take some amoxicillin at home   which helped but did not take last night and now swelling is worse today.    Description: A postcontrast CT scan of the maxillofacial region was   performed.    No prior maxillofacial imaging study was available for comparison at this   Medical Center.    Axial images with coronal and sagittal reformatted series were obtained.    90 cc intravenous Omnipaque 350 contrast was administered.    Extensive nasal and perinasal soft tissue swelling is noted. Soft tissue   swelling also involves the left preseptal periorbital soft tissues,   without evidence for post septal extension at this time. This most likely   reflects a soft tissue infectious phlegmon-cellulitis, without focal   abscess collection at this time.    Both maxillary sinuses are severely opacified. The secretions demonstrate   increased density on the right, which may reflect chronicity-inspissation   versus chronic fungal sinus disease. The walls of the maxillary sinuses   are mildly thickened suggesting a chronic sinusitis component. Mild   mucosal thickening involves the ethmoid sinuses. The sphenoid sinus is   overall well aerated.    Bilateral nasal bone deformities are noted which may reflect old   fractures (there is no submitted clinicalhistory of recent facial   trauma).    A high-grade stenosis involves the origin of the left internal carotid   artery, likely measuring greater than 80% via NASCET criteria. Calcified   plaque and moderate stenosis involves the origin of the right internal   carotid artery. Correlation with carotid ultrasound is recommended.    Impacted horizontally oriented wisdom teeth involve the bilateral   maxilla-floor of the maxillary sinuses.    IMPRESSION:    Extensive nasal and perinasal soft tissue swelling is noted. Soft tissue   swelling also involves the left preseptal periorbital soft tissues,   without evidence for post septal extension at this time. This most likely   reflects a soft tissue infectious phlegmon-cellulitis, without focal   abscess collection at this time.    Both maxillary sinuses are severely opacified. The secretions demonstrate   increased density on the right, which may reflect chronicity-inspissation   versus chronic fungal sinus disease. The walls of the maxillary sinuses   are mildly thickened suggesting a chronic sinusitis component.      ACC: [id number] EXAM:  CT ABDOMEN AND PELVIS   ORDERED BY: ILSA MIRANDA     PROCEDURE DATE:  02/04/2024          INTERPRETATION:  CLINICAL INFORMATION: Elevated creatinine, urinary   retention    COMPARISON: 7/26/2018    CONTRAST/COMPLICATIONS:  IV Contrast: NONE  Oral Contrast: NONE  Complications: None reported at time of study completion    PROCEDURE:  CT of the Abdomen and Pelvis was performed.  Sagittal and coronal reformats were performed.    FINDINGS:  LOWER CHEST: Within normal limits.    LIVER: Within normal limits.  BILE DUCTS: Normal caliber.  GALLBLADDER: Cholelithiasis.  SPLEEN: Within normal limits.  PANCREAS: Within normal limits.  ADRENALS: Within normal limits.  KIDNEYS/URETERS: Marked bilateral hydronephrosis, as noted previously, to   the level of the bladder, increased in extent since the previous study   with increased distention and dilatation of the ureters. Increased size   of slightly hyperattenuating lesion from the upper pole of the right   kidney, measuring 3.8 cm, compared to 1.6 cm previously. Smaller,   bilateral simple cysts.    BLADDER: Distended with trabeculations  REPRODUCTIVE ORGANS: Enlarged prostate gland with seeds in place    BOWEL: No bowel obstruction.  PERITONEUM: No ascites.  VESSELS: Within normal limits.  RETROPERITONEUM/LYMPH NODES: No lymphadenopathy.  ABDOMINAL WALL: Within normal limits.  BONES: Degenerative changes. Bilateral marked muscular atrophy in the hip   joints.    IMPRESSION:  1.  Marked bilateral hydronephrosis to the level of the bladder,   increased in severity and extent since the previous study.  2.  Increased size of a hyperattenuating exophytic right renal lesion,   possible hyper proteinaceous cyst. Would recommend elective repeat   ultrasound.        ACC: [id number] EXAM:  XR FOOT 2 VIEWS RT   ORDERED BY: AVA BOOGIE     PROCEDURE DATE:  02/09/2024          INTERPRETATION:  INDICATION: Right heel pain and erythema. Rule out   fracture or infection    TECHNIQUE: 3 views of the right foot    COMPARISON: None.    FINDINGS: Surgical hardware is present in the region of the talocalcaneal   joint. There is bony fusion/syndesmosis deformity of most of the   hindfoot/distal tibia and fibula. No fracture is seen. No cortical   irregularity or erosion is seen to suggest osteomyelitis. Vascular   calcifications are noted. No subcutaneous tracking gas is seen.    IMPRESSION: No fracture or radiographic evidence of infection visualized   in the right foot with attention to the right heel.

## 2024-02-20 NOTE — PROGRESS NOTE ADULT - SUBJECTIVE AND OBJECTIVE BOX
Patient is a 73y old  Male who presents with a chief complaint of L facial swelling. (20 Feb 2024 15:18)      Vital Signs Last 24 Hrs  T(C): 36.9 (20 Feb 2024 16:01), Max: 36.9 (20 Feb 2024 16:01)  T(F): 98.4 (20 Feb 2024 16:01), Max: 98.4 (20 Feb 2024 16:01)  HR: 85 (20 Feb 2024 16:01) (85 - 88)  BP: 132/61 (20 Feb 2024 16:01) (129/57 - 135/66)  BP(mean): --  RR: 18 (20 Feb 2024 16:01) (18 - 18)  SpO2: 96% (20 Feb 2024 16:01) (96% - 97%)    Parameters below as of 20 Feb 2024 16:01  Patient On (Oxygen Delivery Method): room air  AAO x 3   Normal respiratory effort  Normal peripheral circulation  Suprapubic catheter: pink tinged urine with occasional clots         LABS:                        7.4    8.97  )-----------( 266      ( 20 Feb 2024 07:00 )             23.4     02-20    136  |  102  |  66<H>  ----------------------------<  86  4.2   |  29  |  3.45<H>    Ca    7.9<L>      20 Feb 2024 07:00        Urinalysis Basic - ( 20 Feb 2024 07:00 )    Color: x / Appearance: x / SG: x / pH: x  Gluc: 86 mg/dL / Ketone: x  / Bili: x / Urobili: x   Blood: x / Protein: x / Nitrite: x   Leuk Esterase: x / RBC: x / WBC x   Sq Epi: x / Non Sq Epi: x / Bacteria: x

## 2024-02-20 NOTE — PROGRESS NOTE ADULT - PROBLEM SELECTOR PROBLEM 2
Bilateral hydronephrosis

## 2024-02-21 ENCOUNTER — TRANSCRIPTION ENCOUNTER (OUTPATIENT)
Age: 74
End: 2024-02-21

## 2024-02-21 LAB
ANION GAP SERPL CALC-SCNC: 5 MMOL/L — SIGNIFICANT CHANGE UP (ref 5–17)
BUN SERPL-MCNC: 62 MG/DL — HIGH (ref 7–23)
CALCIUM SERPL-MCNC: 8 MG/DL — LOW (ref 8.5–10.1)
CHLORIDE SERPL-SCNC: 103 MMOL/L — SIGNIFICANT CHANGE UP (ref 96–108)
CO2 SERPL-SCNC: 30 MMOL/L — SIGNIFICANT CHANGE UP (ref 22–31)
CREAT SERPL-MCNC: 3.49 MG/DL — HIGH (ref 0.5–1.3)
CULTURE RESULTS: SIGNIFICANT CHANGE UP
EGFR: 18 ML/MIN/1.73M2 — LOW
GLUCOSE SERPL-MCNC: 94 MG/DL — SIGNIFICANT CHANGE UP (ref 70–99)
HCT VFR BLD CALC: 24.3 % — LOW (ref 39–50)
HGB BLD-MCNC: 7.7 G/DL — LOW (ref 13–17)
MCHC RBC-ENTMCNC: 28.6 PG — SIGNIFICANT CHANGE UP (ref 27–34)
MCHC RBC-ENTMCNC: 31.7 GM/DL — LOW (ref 32–36)
MCV RBC AUTO: 90.3 FL — SIGNIFICANT CHANGE UP (ref 80–100)
PLATELET # BLD AUTO: 286 K/UL — SIGNIFICANT CHANGE UP (ref 150–400)
POTASSIUM SERPL-MCNC: 4.1 MMOL/L — SIGNIFICANT CHANGE UP (ref 3.5–5.3)
POTASSIUM SERPL-SCNC: 4.1 MMOL/L — SIGNIFICANT CHANGE UP (ref 3.5–5.3)
RBC # BLD: 2.69 M/UL — LOW (ref 4.2–5.8)
RBC # FLD: 12.3 % — SIGNIFICANT CHANGE UP (ref 10.3–14.5)
SODIUM SERPL-SCNC: 138 MMOL/L — SIGNIFICANT CHANGE UP (ref 135–145)
SPECIMEN SOURCE: SIGNIFICANT CHANGE UP
WBC # BLD: 10.11 K/UL — SIGNIFICANT CHANGE UP (ref 3.8–10.5)
WBC # FLD AUTO: 10.11 K/UL — SIGNIFICANT CHANGE UP (ref 3.8–10.5)

## 2024-02-21 PROCEDURE — 99232 SBSQ HOSP IP/OBS MODERATE 35: CPT

## 2024-02-21 RX ORDER — SEVELAMER CARBONATE 2400 MG/1
1 POWDER, FOR SUSPENSION ORAL
Qty: 90 | Refills: 0
Start: 2024-02-21 | End: 2024-03-21

## 2024-02-21 RX ORDER — AMLODIPINE BESYLATE 2.5 MG/1
1 TABLET ORAL
Qty: 30 | Refills: 0
Start: 2024-02-21 | End: 2024-03-21

## 2024-02-21 RX ORDER — ACETAMINOPHEN 500 MG
2 TABLET ORAL
Qty: 0 | Refills: 0 | DISCHARGE
Start: 2024-02-21

## 2024-02-21 RX ORDER — CYCLOBENZAPRINE HYDROCHLORIDE 10 MG/1
1 TABLET, FILM COATED ORAL
Qty: 60 | Refills: 0
Start: 2024-02-21 | End: 2024-03-21

## 2024-02-21 RX ORDER — SODIUM BICARBONATE 1 MEQ/ML
1 SYRINGE (ML) INTRAVENOUS
Qty: 30 | Refills: 0
Start: 2024-02-21 | End: 2024-03-21

## 2024-02-21 RX ORDER — ATORVASTATIN CALCIUM 80 MG/1
1 TABLET, FILM COATED ORAL
Qty: 30 | Refills: 0
Start: 2024-02-21 | End: 2024-03-21

## 2024-02-21 RX ADMIN — AMLODIPINE BESYLATE 10 MILLIGRAM(S): 2.5 TABLET ORAL at 11:07

## 2024-02-21 RX ADMIN — Medication 650 MILLIGRAM(S): at 21:30

## 2024-02-21 RX ADMIN — OXYCODONE HYDROCHLORIDE 10 MILLIGRAM(S): 5 TABLET ORAL at 21:32

## 2024-02-21 RX ADMIN — PREGABALIN 1000 MICROGRAM(S): 225 CAPSULE ORAL at 11:07

## 2024-02-21 RX ADMIN — SEVELAMER CARBONATE 800 MILLIGRAM(S): 2400 POWDER, FOR SUSPENSION ORAL at 17:22

## 2024-02-21 RX ADMIN — Medication 650 MILLIGRAM(S): at 11:08

## 2024-02-21 RX ADMIN — Medication 1 MILLIGRAM(S): at 11:07

## 2024-02-21 RX ADMIN — CYCLOBENZAPRINE HYDROCHLORIDE 10 MILLIGRAM(S): 10 TABLET, FILM COATED ORAL at 21:32

## 2024-02-21 RX ADMIN — ATORVASTATIN CALCIUM 40 MILLIGRAM(S): 80 TABLET, FILM COATED ORAL at 21:30

## 2024-02-21 RX ADMIN — Medication 3 MILLIGRAM(S): at 21:29

## 2024-02-21 RX ADMIN — SEVELAMER CARBONATE 800 MILLIGRAM(S): 2400 POWDER, FOR SUSPENSION ORAL at 11:08

## 2024-02-21 NOTE — PROGRESS NOTE ADULT - ASSESSMENT
A/P: 73y Male with SPT      Can D/C home with SPT to leg bag  Pt will have to have SPT exchanged in IR in 5 weeks  Please instruct pt and his familly how to irrigate SPT with normal saline and piston syringe  Follow up with Dr. Xiao in 2 weeks.  Above discussed with Dr. Xiao

## 2024-02-21 NOTE — PROGRESS NOTE ADULT - SUBJECTIVE AND OBJECTIVE BOX
Pt resting in bed.  SPT draining yellow urine. No blood noted.    Vital Signs Last 24 Hrs  T(C): 36.6 (21 Feb 2024 08:12), Max: 37 (20 Feb 2024 23:01)  T(F): 97.9 (21 Feb 2024 08:12), Max: 98.6 (20 Feb 2024 23:01)  HR: 88 (21 Feb 2024 08:12) (85 - 88)  BP: 141/67 (21 Feb 2024 08:12) (132/61 - 141/67)  BP(mean): --  RR: 18 (21 Feb 2024 08:12) (17 - 18)  SpO2: 96% (21 Feb 2024 08:12) (96% - 97%)    Parameters below as of 21 Feb 2024 08:12  Patient On (Oxygen Delivery Method): room air        I&O's Summary    20 Feb 2024 07:01  -  21 Feb 2024 07:00  --------------------------------------------------------  IN: 0 mL / OUT: 2150 mL / NET: -2150 mL        Physical Exam  Gen: NAD, A&Ox3  Abd: Soft, NT, ND, SPT - Urine yellow - 1100cc                            7.7    10.11 )-----------( 286      ( 21 Feb 2024 07:30 )             24.3       02-21    138  |  103  |  62<H>  ----------------------------<  94  4.1   |  30  |  3.49<H>    Ca    8.0<L>      21 Feb 2024 07:30

## 2024-02-21 NOTE — DISCHARGE NOTE PROVIDER - CARE PROVIDERS DIRECT ADDRESSES
[email or web address]

## 2024-02-21 NOTE — DISCHARGE NOTE PROVIDER - PROVIDER TOKENS
PROVIDER:[TOKEN:[[id number]]],PROVIDER:[TOKEN:[[id number]]],PROVIDER:[TOKEN:[[id number]]]

## 2024-02-21 NOTE — DISCHARGE NOTE PROVIDER - NSDCCPCAREPLAN_GEN_ALL_CORE_FT
PRINCIPAL DISCHARGE DIAGNOSIS  Diagnosis: SONDRA (acute kidney injury)  Assessment and Plan of Treatment: Follow up with your PMD

## 2024-02-21 NOTE — DISCHARGE NOTE PROVIDER - CARE PROVIDER_API CALL
Sohan Xiao  Urology  [address], [address]  [place] [address]  Phone: [phone]  Fax: [phone]  Follow Up Time:     Leila Orona  Vascular Surgery  [address]  [place] [address]  Phone: [phone]  Fax: [phone]  Follow Up Time:     Fernandez Barron)  Nephrology  [address] [place] [address]  Phone: [phone]  Fax: [phone]  Follow Up Time:

## 2024-02-21 NOTE — DISCHARGE NOTE PROVIDER - HOSPITAL COURSE
72 y/o M w/ PMH of DM2, HTN, polio with paraplegia,  CKD IV, BPH, pulmonary HTN, p/w L facial swelling. Swelling started 3 days PTA  and has gotten progressively worse. C/o pain as well. Patient also has discomfort in his nose. Patient took a dose of amoxicillin outpatient. No  fever, chills, cough, runny nose, sore throat. Patient stated  that he is suppose to have a suprapubic catheter placed soon.     INTERVAL HPI/ OVERNIGHT EVENTS:  Pt was seen and examined, s/p SPT placement yesterday, reports feels tired some bladder pain, also report R calf pain. But states that able to sleep at night and  " meds are working" POC discussed   02/16/24: Patient seen and examined. Hematuria resolved. No new issues.   02/17/24: Now with hematuria and dropped HGB to 8.2 from 9.3 yesterday  02/18/24: Hematuria improving. HGB down to 8.0 today.  02/19/24: Now with gross hematuria. Discussed with on call BETTY Rai. He will see the patient.   02/20/24: Still with hematuria. Patient refusing CBI. Patient also wants to hold the blood transfusion for now. Discussed with on call BETTY Rai yesterday and also BETTY Christianson today. Discussed with wife today in length regarding management plan.       1. L Periorbital cellulitis,  improved   -  clinically on ABXs, S/p Zosyn, now on  Meropenem (day #7) completed  and Doxy completed   -  blood cultures from 2/4 are negative  -  CT reports extensive nasal and perinasal soft tissue swelling. +soft tissue swelling involves L pre-septal periorbital soft tissues without evidence for post septal extension.   -  CT also reports that both maxillary sinuses are severely opacified w/ possible inspissation vs chronic fungal disease + chronic sinusitis  - Pt will need to f/u with ENT outPt (  discussed with Pt but hesitant)     2. High grade Bilateral internal carotid artery stenoses  -  s/p neuro and vascular consults  -  patient has refused MRI/MRA brain  -  continue ASA and statin  -  vascular followup with Dr. Leila Orona upon discharge     3.  SONDRA due to B/L hydronephrosis + Exophytic Renal lesion + Suspected SONDRA on CKD stage IV. Hyperphosphatemia   -  CT reports marked B/L hydronephrosis increased in severity and extent, increased size of a hyperattenuating exophytic right renal lesion 3.8cm  - s/p attempt for suprapubic catheter placement, complicated by bowel injury requiring procedure to be aborted and now s/p colostomy  - S/p SPT placement. Hematuria.   -  avoid NSAIDs and nephrotoxic meds  -  continue Sevalemer  -  will eventually need a fistula as he is high risk for progression to ESRD    4. Complicated MDRO  Pseudomonas UTI due to obstructive   uropathy   -  maintain isolation   -  Completed Meropenem  7 days course     5. Urinary obstruction   Hematuria-resolved  -  s/p attempt for suprapubic catheter placement, complicated by bowel injury requiring procedure to be aborted and now s/p colostomy  - S/p SPT placement on 2/14/24, mcconnell removed   - Follow closely due to hematuria, follow HGB    6.  Acute Rectal Injury, s/p diverting colostomy  -  wound care per colorectal  -  colostomy care and teaching done by Rakan Padilla RN     7. Hypertension  -C/w  Norvasc 10mg daily    8. R heel pain   likely  Muscle spasm, responded to muscle relaxants        9. Cholelithiasis  -  asymptomatic, f/u outpatient     10. H/o Type 2 Diabetes Mellitus  regular diet     11.  Metabolic Acidosis  - due to advanced CKD, stable  -  continue sodium bicarbonate tabs    12. Acute on chr Anemia of Chronic Disease  Acute blood loss anemia due to hematuria  Stable     13. Hx of Polio  -  has chronic paraplegia  Supportive care    D/C home today  Spent more than 30 min to prepare the discharge                     72 y/o M w/ PMH of DM2, HTN, polio with paraplegia,  CKD IV, BPH, pulmonary HTN, p/w L facial swelling. Swelling started 3 days PTA  and has gotten progressively worse. C/o pain as well. Patient also has discomfort in his nose. Patient took a dose of amoxicillin outpatient. No  fever, chills, cough, runny nose, sore throat. Patient stated  that he is suppose to have a suprapubic catheter placed soon.   INTERVAL HPI/ OVERNIGHT EVENTS:  Pt was seen and examined, s/p SPT placement yesterday, reports feels tired some bladder pain, also report R calf pain. But states that able to sleep at night and  " meds are working" POC discussed   02/16/24: Patient seen and examined. Hematuria resolved. No new issues.   02/17/24: Now with hematuria and dropped HGB to 8.2 from 9.3 yesterday  02/18/24: Hematuria improving. HGB down to 8.0 today.  02/19/24: Now with gross hematuria. Discussed with on call BETTY Rai. He will see the patient.   02/20/24: Still with hematuria. Patient refusing CBI. Patient also wants to hold the blood transfusion for now. Discussed with on call BETTY Rai yesterday and also BETTY Christianson today. Discussed with wife today in length regarding management plan.       1. L Periorbital cellulitis,  improved   -  clinically on ABXs, S/p Zosyn, now on  Meropenem (day #7) completed  and Doxy completed   -  blood cultures from 2/4 are negative  -  CT reports extensive nasal and perinasal soft tissue swelling. +soft tissue swelling involves L pre-septal periorbital soft tissues without evidence for post septal extension.   -  CT also reports that both maxillary sinuses are severely opacified w/ possible inspissation vs chronic fungal disease + chronic sinusitis  - Pt will need to f/u with ENT outPt (  discussed with Pt but hesitant)     2. High grade Bilateral internal carotid artery stenoses  -  s/p neuro and vascular consults  -  patient has refused MRI/MRA brain  -  continue ASA and statin  -  vascular followup with Dr. Leila Orona upon discharge     3.  SONDRA due to B/L hydronephrosis + Exophytic Renal lesion + Suspected SONDRA on CKD stage IV. Hyperphosphatemia   -  CT reports marked B/L hydronephrosis increased in severity and extent, increased size of a hyperattenuating exophytic right renal lesion 3.8cm  - s/p attempt for suprapubic catheter placement, complicated by bowel injury requiring procedure to be aborted and now s/p colostomy  - S/p SPT placement. Hematuria.   -  avoid NSAIDs and nephrotoxic meds  -  continue Sevalemer  -  will eventually need a fistula as he is high risk for progression to ESRD    4. Complicated MDRO  Pseudomonas UTI due to obstructive   uropathy   -  maintain isolation   -  Completed Meropenem  7 days course     5. Urinary obstruction   Hematuria-resolved  -  s/p attempt for suprapubic catheter placement, complicated by bowel injury requiring procedure to be aborted and now s/p colostomy  - S/p SPT placement on 2/14/24, mcconnell removed   - Follow closely due to hematuria, follow HGB    6.  Acute Rectal Injury, s/p diverting colostomy  -  wound care per colorectal  -  colostomy care and teaching done by Rakan Padilla RN     7. Hypertension  -C/w  Norvasc 10mg daily    8. R heel pain   likely  Muscle spasm, responded to muscle relaxants        9. Cholelithiasis  -  asymptomatic, f/u outpatient     10. H/o Type 2 Diabetes Mellitus  regular diet     11.  Metabolic Acidosis  - due to advanced CKD, stable  -  continue sodium bicarbonate tabs    12. Acute on chr Anemia of Chronic Disease  Acute blood loss anemia due to hematuria  Stable     13. Hx of Polio  -  has chronic paraplegia  Supportive care    D/C home today  Spent more than 30 min to prepare the discharge

## 2024-02-21 NOTE — DISCHARGE NOTE PROVIDER - NSDCMRMEDTOKEN_GEN_ALL_CORE_FT
acetaminophen 325 mg oral tablet: 2 tab(s) orally every 6 hours As needed Mild Pain (1 - 3)  amLODIPine 10 mg oral tablet: 1 tab(s) orally once a day  atorvastatin 40 mg oral tablet: 1 tab(s) orally once a day (at bedtime)  cyclobenzaprine 5 mg oral tablet: 1 tab(s) orally 2 times a day as needed for Muscle Spasm  sevelamer carbonate 800 mg oral tablet: 1 tab(s) orally 3 times a day (with meals)  sodium bicarbonate 650 mg oral tablet: 1 tab(s) orally once a day

## 2024-02-22 VITALS
SYSTOLIC BLOOD PRESSURE: 141 MMHG | OXYGEN SATURATION: 96 % | RESPIRATION RATE: 16 BRPM | DIASTOLIC BLOOD PRESSURE: 63 MMHG | HEART RATE: 91 BPM

## 2024-02-22 PROCEDURE — 99239 HOSP IP/OBS DSCHRG MGMT >30: CPT

## 2024-02-22 RX ADMIN — Medication 1 MILLIGRAM(S): at 10:06

## 2024-02-22 RX ADMIN — PREGABALIN 1000 MICROGRAM(S): 225 CAPSULE ORAL at 10:05

## 2024-02-22 RX ADMIN — CYCLOBENZAPRINE HYDROCHLORIDE 5 MILLIGRAM(S): 10 TABLET, FILM COATED ORAL at 10:07

## 2024-02-22 RX ADMIN — Medication 650 MILLIGRAM(S): at 10:05

## 2024-02-22 RX ADMIN — SEVELAMER CARBONATE 800 MILLIGRAM(S): 2400 POWDER, FOR SUSPENSION ORAL at 10:06

## 2024-02-22 NOTE — PROGRESS NOTE ADULT - REASON FOR ADMISSION
L facial swelling.
s/p diverting Sigmoid loop colostomy after intra-OR rectal injury
L facial swelling.
L facial swelling.

## 2024-02-22 NOTE — PROGRESS NOTE ADULT - PROVIDER SPECIALTY LIST ADULT
Hospitalist
Infectious Disease
Infectious Disease
Internal Medicine
Intervent Radiology
Nephrology
Nephrology
Urology
Colorectal Surgery
Hospitalist
Infectious Disease
Internal Medicine
Colorectal Surgery
Hospitalist
Nephrology
Nephrology
Urology
Colorectal Surgery
Hospitalist
Infectious Disease
Intervent Radiology
Nephrology
Nephrology
Neurology
SICU
Urology
Urology
Vascular Surgery
Internal Medicine
Urology
Urology

## 2024-02-22 NOTE — PROGRESS NOTE ADULT - SUBJECTIVE AND OBJECTIVE BOX
Patient seen at bedside. Reports he would like to go home. Denies any abd/flank pain. Urine noted to be yellow in SPT.    PE  VITALS  T(C): 36.7 (02-22-24 @ 08:40), Max: 37.2 (02-21-24 @ 15:29)  HR: 89 (02-22-24 @ 08:40) (89 - 90)  BP: 114/63 (02-22-24 @ 08:40) (114/63 - 141/60)  RR: 18 (02-22-24 @ 08:40) (17 - 18)  SpO2: 98% (02-22-24 @ 08:40) (96% - 98%)            Skin     : No jaundice  Lung    : No resp distress  Abdo:   : Soft, Non tender, No guarding, No distension. Suprapubic tube in place draining yellow urine  Genitalia Male: No Murillo  Neuro   : A&Ox3        LABS                        7.7    10.11 )-----------( 286      ( 21 Feb 2024 07:30 )             24.3   02-21    138  |  103  |  62<H>  ----------------------------<  94  4.1   |  30  |  3.49<H>    Ca    8.0<L>      21 Feb 2024 07:30

## 2024-02-22 NOTE — PROGRESS NOTE ADULT - ASSESSMENT
73y Male with SPT      Can D/C home with SPT to leg bag  Pt will have to have SPT exchanged in IR in 5 weeks  Please instruct pt and his familly how to irrigate SPT with normal saline and piston syringe  Follow up with Dr. Xiao in 2 weeks.  Above discussed with Dr. Xiao 73 year old Male with suprapubic tube. Urine draining well. Pt being discharged in an hour, requested to clamp SPT so he can get dressed- SPT clamped discussed and explained to pt at length that SPT should be reconnected to drainage once he gets dressed and to not leave SPT clamped for hours. Pt verbalized understanding.   Recommend  Can D/C home with SPT to leg bag  Pt will have to have SPT exchanged in IR in 5 weeks  Please instruct pt and his familly how to irrigate SPT with normal saline and piston syringe  Follow up with Dr. Xiao in 2 weeks.      Case discussed with Dr. Xiao

## 2024-03-01 DIAGNOSIS — G47.00 INSOMNIA, UNSPECIFIED: ICD-10-CM

## 2024-03-01 DIAGNOSIS — Z53.09 PROCEDURE AND TREATMENT NOT CARRIED OUT BECAUSE OF OTHER CONTRAINDICATION: ICD-10-CM
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
## 2024-03-01 DIAGNOSIS — K80.20 CALCULUS OF GALLBLADDER WITHOUT CHOLECYSTITIS WITHOUT OBSTRUCTION: ICD-10-CM

## 2024-03-01 DIAGNOSIS — Z74.01 BED CONFINEMENT STATUS: ICD-10-CM

## 2024-03-01 DIAGNOSIS — I65.22 OCCLUSION AND STENOSIS OF LEFT CAROTID ARTERY: ICD-10-CM

## 2024-03-01 DIAGNOSIS — R33.8 OTHER RETENTION OF URINE: ICD-10-CM

## 2024-03-01 DIAGNOSIS — E83.42 HYPOMAGNESEMIA: ICD-10-CM

## 2024-03-01 DIAGNOSIS — B96.5 PSEUDOMONAS (AERUGINOSA) (MALLEI) (PSEUDOMALLEI) AS THE CAUSE OF DISEASES CLASSIFIED ELSEWHERE: ICD-10-CM

## 2024-03-01 DIAGNOSIS — N28.1 CYST OF KIDNEY, ACQUIRED: ICD-10-CM

## 2024-03-01 DIAGNOSIS — I27.20 PULMONARY HYPERTENSION, UNSPECIFIED: ICD-10-CM

## 2024-03-01 DIAGNOSIS — E83.39 OTHER DISORDERS OF PHOSPHORUS METABOLISM: ICD-10-CM

## 2024-03-01 DIAGNOSIS — J32.0 CHRONIC MAXILLARY SINUSITIS: ICD-10-CM

## 2024-03-01 DIAGNOSIS — N17.9 ACUTE KIDNEY FAILURE, UNSPECIFIED: ICD-10-CM

## 2024-03-01 DIAGNOSIS — E87.20 ACIDOSIS, UNSPECIFIED: ICD-10-CM

## 2024-03-01 DIAGNOSIS — N13.8 OTHER OBSTRUCTIVE AND REFLUX UROPATHY: ICD-10-CM

## 2024-03-01 DIAGNOSIS — D62 ACUTE POSTHEMORRHAGIC ANEMIA: ICD-10-CM

## 2024-03-01 DIAGNOSIS — E11.22 TYPE 2 DIABETES MELLITUS WITH DIABETIC CHRONIC KIDNEY DISEASE: ICD-10-CM

## 2024-03-01 DIAGNOSIS — N18.4 CHRONIC KIDNEY DISEASE, STAGE 4 (SEVERE): ICD-10-CM

## 2024-03-01 DIAGNOSIS — E87.6 HYPOKALEMIA: ICD-10-CM

## 2024-03-01 DIAGNOSIS — L03.213 PERIORBITAL CELLULITIS: ICD-10-CM

## 2024-03-01 DIAGNOSIS — Z53.29 PROCEDURE AND TREATMENT NOT CARRIED OUT BECAUSE OF PATIENT'S DECISION FOR OTHER REASONS: ICD-10-CM

## 2024-03-01 DIAGNOSIS — D63.1 ANEMIA IN CHRONIC KIDNEY DISEASE: ICD-10-CM

## 2024-03-01 DIAGNOSIS — I12.9 HYPERTENSIVE CHRONIC KIDNEY DISEASE WITH STAGE 1 THROUGH STAGE 4 CHRONIC KIDNEY DISEASE, OR UNSPECIFIED CHRONIC KIDNEY DISEASE: ICD-10-CM

## 2024-03-01 DIAGNOSIS — Z99.3 DEPENDENCE ON WHEELCHAIR: ICD-10-CM

## 2024-03-01 DIAGNOSIS — G14 POSTPOLIO SYNDROME: ICD-10-CM

## 2024-03-01 DIAGNOSIS — N13.6 PYONEPHROSIS: ICD-10-CM

## 2024-03-01 DIAGNOSIS — G82.20 PARAPLEGIA, UNSPECIFIED: ICD-10-CM

## 2024-03-01 DIAGNOSIS — I65.23 OCCLUSION AND STENOSIS OF BILATERAL CAROTID ARTERIES: ICD-10-CM

## 2024-03-01 DIAGNOSIS — K91.72 ACCIDENTAL PUNCTURE AND LACERATION OF A DIGESTIVE SYSTEM ORGAN OR STRUCTURE DURING OTHER PROCEDURE: ICD-10-CM

## 2024-03-01 DIAGNOSIS — Z16.24 RESISTANCE TO MULTIPLE ANTIBIOTICS: ICD-10-CM

## 2024-03-01 DIAGNOSIS — L03.211 CELLULITIS OF FACE: ICD-10-CM

## 2024-03-01 DIAGNOSIS — R32 UNSPECIFIED URINARY INCONTINENCE: ICD-10-CM

## 2024-03-01 DIAGNOSIS — M62.838 OTHER MUSCLE SPASM: ICD-10-CM

## 2024-03-01 DIAGNOSIS — N32.0 BLADDER-NECK OBSTRUCTION: ICD-10-CM

## 2024-03-01 DIAGNOSIS — R31.0 GROSS HEMATURIA: ICD-10-CM

## 2024-03-01 DIAGNOSIS — N40.1 BENIGN PROSTATIC HYPERPLASIA WITH LOWER URINARY TRACT SYMPTOMS: ICD-10-CM

## 2024-03-04 ENCOUNTER — EMERGENCY (EMERGENCY)
Facility: HOSPITAL | Age: 74
LOS: 0 days | Discharge: HOME | End: 2024-03-04
Attending: FAMILY MEDICINE
Payer: MEDICARE

## 2024-03-04 VITALS
RESPIRATION RATE: 18 BRPM | SYSTOLIC BLOOD PRESSURE: 143 MMHG | DIASTOLIC BLOOD PRESSURE: 60 MMHG | OXYGEN SATURATION: 100 % | TEMPERATURE: 98 F | HEART RATE: 88 BPM

## 2024-03-04 VITALS — HEIGHT: 69 IN | WEIGHT: 210.1 LBS

## 2024-03-04 DIAGNOSIS — N18.9 CHRONIC KIDNEY DISEASE, UNSPECIFIED: ICD-10-CM

## 2024-03-04 DIAGNOSIS — E11.22 TYPE 2 DIABETES MELLITUS WITH DIABETIC CHRONIC KIDNEY DISEASE: ICD-10-CM

## 2024-03-04 DIAGNOSIS — D63.1 ANEMIA IN CHRONIC KIDNEY DISEASE: ICD-10-CM

## 2024-03-04 DIAGNOSIS — Z99.3 DEPENDENCE ON WHEELCHAIR: ICD-10-CM

## 2024-03-04 DIAGNOSIS — Z93.59 OTHER CYSTOSTOMY STATUS: Chronic | ICD-10-CM

## 2024-03-04 DIAGNOSIS — I12.9 HYPERTENSIVE CHRONIC KIDNEY DISEASE WITH STAGE 1 THROUGH STAGE 4 CHRONIC KIDNEY DISEASE, OR UNSPECIFIED CHRONIC KIDNEY DISEASE: ICD-10-CM

## 2024-03-04 DIAGNOSIS — Z93.3 COLOSTOMY STATUS: Chronic | ICD-10-CM

## 2024-03-04 DIAGNOSIS — R53.1 WEAKNESS: ICD-10-CM

## 2024-03-04 DIAGNOSIS — Z93.3 COLOSTOMY STATUS: ICD-10-CM

## 2024-03-04 LAB
ALBUMIN SERPL ELPH-MCNC: 2.3 G/DL — LOW (ref 3.3–5)
ALP SERPL-CCNC: 124 U/L — HIGH (ref 40–120)
ALT FLD-CCNC: 35 U/L — SIGNIFICANT CHANGE UP (ref 12–78)
ANION GAP SERPL CALC-SCNC: 5 MMOL/L — SIGNIFICANT CHANGE UP (ref 5–17)
APTT BLD: 30.8 SEC — SIGNIFICANT CHANGE UP (ref 24.5–35.6)
AST SERPL-CCNC: 20 U/L — SIGNIFICANT CHANGE UP (ref 15–37)
BASOPHILS # BLD AUTO: 0.02 K/UL — SIGNIFICANT CHANGE UP (ref 0–0.2)
BASOPHILS NFR BLD AUTO: 0.2 % — SIGNIFICANT CHANGE UP (ref 0–2)
BILIRUB SERPL-MCNC: 0.3 MG/DL — SIGNIFICANT CHANGE UP (ref 0.2–1.2)
BLD GP AB SCN SERPL QL: SIGNIFICANT CHANGE UP
BUN SERPL-MCNC: 53 MG/DL — HIGH (ref 7–23)
CALCIUM SERPL-MCNC: 7.8 MG/DL — LOW (ref 8.5–10.1)
CHLORIDE SERPL-SCNC: 104 MMOL/L — SIGNIFICANT CHANGE UP (ref 96–108)
CO2 SERPL-SCNC: 28 MMOL/L — SIGNIFICANT CHANGE UP (ref 22–31)
CREAT SERPL-MCNC: 3.55 MG/DL — HIGH (ref 0.5–1.3)
EGFR: 17 ML/MIN/1.73M2 — LOW
EOSINOPHIL # BLD AUTO: 0.34 K/UL — SIGNIFICANT CHANGE UP (ref 0–0.5)
EOSINOPHIL NFR BLD AUTO: 3.5 % — SIGNIFICANT CHANGE UP (ref 0–6)
GLUCOSE SERPL-MCNC: 126 MG/DL — HIGH (ref 70–99)
HCT VFR BLD CALC: 25 % — LOW (ref 39–50)
HGB BLD-MCNC: 7.8 G/DL — LOW (ref 13–17)
IMM GRANULOCYTES NFR BLD AUTO: 0.3 % — SIGNIFICANT CHANGE UP (ref 0–0.9)
INR BLD: 1.19 RATIO — HIGH (ref 0.85–1.18)
LYMPHOCYTES # BLD AUTO: 1.42 K/UL — SIGNIFICANT CHANGE UP (ref 1–3.3)
LYMPHOCYTES # BLD AUTO: 14.7 % — SIGNIFICANT CHANGE UP (ref 13–44)
MCHC RBC-ENTMCNC: 28.4 PG — SIGNIFICANT CHANGE UP (ref 27–34)
MCHC RBC-ENTMCNC: 31.2 GM/DL — LOW (ref 32–36)
MCV RBC AUTO: 90.9 FL — SIGNIFICANT CHANGE UP (ref 80–100)
MONOCYTES # BLD AUTO: 1.06 K/UL — HIGH (ref 0–0.9)
MONOCYTES NFR BLD AUTO: 11 % — SIGNIFICANT CHANGE UP (ref 2–14)
NEUTROPHILS # BLD AUTO: 6.76 K/UL — SIGNIFICANT CHANGE UP (ref 1.8–7.4)
NEUTROPHILS NFR BLD AUTO: 70.3 % — SIGNIFICANT CHANGE UP (ref 43–77)
PLATELET # BLD AUTO: 281 K/UL — SIGNIFICANT CHANGE UP (ref 150–400)
POTASSIUM SERPL-MCNC: 4.2 MMOL/L — SIGNIFICANT CHANGE UP (ref 3.5–5.3)
POTASSIUM SERPL-SCNC: 4.2 MMOL/L — SIGNIFICANT CHANGE UP (ref 3.5–5.3)
PROT SERPL-MCNC: 7 GM/DL — SIGNIFICANT CHANGE UP (ref 6–8.3)
PROTHROM AB SERPL-ACNC: 13.4 SEC — HIGH (ref 9.5–13)
RBC # BLD: 2.75 M/UL — LOW (ref 4.2–5.8)
RBC # FLD: 12.5 % — SIGNIFICANT CHANGE UP (ref 10.3–14.5)
SODIUM SERPL-SCNC: 137 MMOL/L — SIGNIFICANT CHANGE UP (ref 135–145)
WBC # BLD: 9.63 K/UL — SIGNIFICANT CHANGE UP (ref 3.8–10.5)
WBC # FLD AUTO: 9.63 K/UL — SIGNIFICANT CHANGE UP (ref 3.8–10.5)

## 2024-03-04 PROCEDURE — 85730 THROMBOPLASTIN TIME PARTIAL: CPT

## 2024-03-04 PROCEDURE — 99291 CRITICAL CARE FIRST HOUR: CPT

## 2024-03-04 PROCEDURE — 80053 COMPREHEN METABOLIC PANEL: CPT

## 2024-03-04 PROCEDURE — 93005 ELECTROCARDIOGRAM TRACING: CPT

## 2024-03-04 PROCEDURE — 86901 BLOOD TYPING SEROLOGIC RH(D): CPT

## 2024-03-04 PROCEDURE — 36430 TRANSFUSION BLD/BLD COMPNT: CPT

## 2024-03-04 PROCEDURE — 86850 RBC ANTIBODY SCREEN: CPT

## 2024-03-04 PROCEDURE — 93010 ELECTROCARDIOGRAM REPORT: CPT

## 2024-03-04 PROCEDURE — 36415 COLL VENOUS BLD VENIPUNCTURE: CPT

## 2024-03-04 PROCEDURE — 85610 PROTHROMBIN TIME: CPT

## 2024-03-04 PROCEDURE — P9016: CPT

## 2024-03-04 PROCEDURE — 86923 COMPATIBILITY TEST ELECTRIC: CPT

## 2024-03-04 PROCEDURE — 86900 BLOOD TYPING SEROLOGIC ABO: CPT

## 2024-03-04 PROCEDURE — 99284 EMERGENCY DEPT VISIT MOD MDM: CPT | Mod: 25

## 2024-03-04 PROCEDURE — 85025 COMPLETE CBC W/AUTO DIFF WBC: CPT

## 2024-03-04 RX ORDER — SODIUM CHLORIDE 9 MG/ML
3 INJECTION INTRAMUSCULAR; INTRAVENOUS; SUBCUTANEOUS ONCE
Refills: 0 | Status: COMPLETED | OUTPATIENT
Start: 2024-03-04 | End: 2024-03-04

## 2024-03-04 RX ADMIN — SODIUM CHLORIDE 3 MILLILITER(S): 9 INJECTION INTRAMUSCULAR; INTRAVENOUS; SUBCUTANEOUS at 16:59

## 2024-03-04 NOTE — ED PROVIDER NOTE - OBJECTIVE STATEMENT
74 y/o male with PMHx of polio, diabetes, HTN presents to the ED c/o weakness and low hemoglobin to 7.8 per PCP blood work. He was recently hospitalized for 1 month in  initially for suprapubic catheter placement for bladder issues complicated by accidental incision to the small intestine in OR and subsequent colostomy placement. No Murillo catheter placement at that time. Denies chest pain, fever, cough, or any other complaints.  PCP: Dr. Teodoro Mcdowell

## 2024-03-04 NOTE — ED PROVIDER NOTE - PROGRESS NOTE DETAILS
Mehdi: pt states he feels the same after 1 unit of blood and requesting another unit. states his previous hemoglobin was 12 in august but when looking over his results he was just as admitted and dcd with hemoglobin 7.7 and did not want a transfusion at that time. pts hemoglobin at time of admission was 9 Mehdi: pt and family called provider into the room to let provider know that the pizza was burnt and he cant believe that they can serve food like this. states this is the reason that this hospital scares him.

## 2024-03-04 NOTE — ED PROVIDER NOTE - NSICDXPASTSURGICALHX_GEN_ALL_CORE_FT
PAST SURGICAL HISTORY:  No significant past surgical history      PAST SURGICAL HISTORY:  S/P colostomy      PAST SURGICAL HISTORY:  S/P colostomy     Suprapubic catheter

## 2024-03-04 NOTE — ED ADULT NURSE NOTE - HOW OFTEN DO YOU HAVE A DRINK CONTAINING ALCOHOL?
Never
D/C instructions not given/Straightforward: Basic instructions, no meds, no home treatment/Verbalized Understanding

## 2024-03-04 NOTE — ED ADULT NURSE NOTE - DRUG PRE-SCREENING (DAST -1)
Result called to patient  -Evero level 9.6 (goal 6-8), confirmed 12-hour trough and current dose 2/1.5. Decrease dose today to 1.5 mg bid and recheck in 7-10 days.     Patient request drug level kits to be sent and [employer] message once his annual schedule due May 2023 is complete.     Patient verbalized understanding and next steps.  
Statement Selected

## 2024-03-04 NOTE — ED PROVIDER NOTE - CLINICAL SUMMARY MEDICAL DECISION MAKING FREE TEXT BOX
Patient with history of polio, CKD, recent attempt at suprapubic cystostomy c/b perforation, now with colostomy. Left the hospital with anemia, now with increasing weakness and fatigue. No fever or cough. Patient is requesting transfusion; his hemoglobin was 7.8. Will order labs and transfuse as necessary.

## 2024-03-04 NOTE — ED PROVIDER NOTE - PATIENT PORTAL LINK FT
You can access the FollowMyHealth Patient Portal offered by  by registering at the following website: [email or web address]. By joining [facility]’s FollowMyHealth portal, you will also be able to view your health information using other applications (apps) compatible with our system.

## 2024-03-04 NOTE — ED PROVIDER NOTE - NSFOLLOWUPINSTRUCTIONS_ED_ALL_ED_FT
Please follow-up with your doctor(s) within the next 3 days, but see medical sooner if your symptoms persist or worsen.  Please call tomorrow for an appointment.    You were given a copy of your labs and/or imaging.  Please go-over these with your doctor(s).     If you have any worsening of symptoms or any other concerns please see your doctor or return to the ED immediately.    Please continue taking your home medications as directed    ANY PENDING RESULTS: You can access the [employer] Patient Portal offered by [facility] by registering at the following website: [email or web address]. By joining [facility]s [employer] portal, you will also be able to view your health information using other applications (apps) compatible with our system.         Blood Transfusion, Adult  A blood transfusion is a procedure in which you receive blood or a type of blood cell (blood component) through an IV. You may need a blood transfusion when you have a low blood count, which is a low number of any blood cell. This may result from a bleeding disorder, illness, injury, or surgery. The blood may come from a donor, or you may be able to have your own blood collected and stored (autologous blood donation) before a planned surgery.    The blood given in a transfusion may be made up of different blood components. You may receive:  Red blood cells. These carry oxygen to the cells in the body.  Platelets. These help your blood to clot.  Plasma. This is the liquid part of your blood. It carries proteins and other substances throughout the body.  White blood cells. These help you fight infections.  If you have hemophilia or another clotting disorder, you may also receive other types of blood products.    Depending on the type of blood product, this procedure may take 1–4 hours to complete.    Tell a health care provider about:  Any bleeding problems you have.  Any previous reactions you have had during a blood transfusion.  Any allergies you have.  All medicines you are taking, including vitamins, herbs, eye drops, creams, and over-the-counter medicines.  Any surgeries you have had.  Any medical conditions you have.  Whether you are pregnant or may be pregnant.  What are the risks?  Talk with your health care provider about risks.  The most common problems include:  A mild allergic reaction, such as red, swollen areas of skin (hives) and itching.  Fever or chills. This may be the body's response to new blood cells received. This may occur during or up to 4 hours after the transfusion.  More serious problems may include:  A serious allergic reaction that causes difficulty breathing or swelling around the face and lips.  Transfusion-associated circulatory overload (TACO), or too much fluid in the lungs. This may cause breathing problems.  Transfusion-related acute lung injury (TRALI), which causes breathing difficulty and low oxygen in the blood. This can occur within hours of the transfusion or several days later.  Iron overload. This can happen after receiving many blood transfusions over a period of time.  Infection or virus being transmitted. This is rare because donated blood is carefully tested before it is given.  Hemolytic transfusion reaction. This is rare. It happens when the body's defense system (immune system)tries to attack the new blood cells. Symptoms may include fever, chills, nausea, low blood pressure, and low back or chest pain.  Transfusion-associated lstrz-bsnfdq-sqts disease (TAGVHD). This is rare. It happens when donated cells attack the body's healthy tissues.  What happens before the procedure?  You will have a blood test to check your blood type. This test is done to know what kind of blood your body will accept and to match it to the donor blood.  If you are going to have a planned surgery, you may be able to do an autologous blood donation. This may be done in case you need to have a transfusion.  You will have your temperature, blood pressure, and pulse checked before the transfusion.  If you have had an allergic reaction to a transfusion in the past, you may be given medicine to help prevent a reaction. This medicine may be given to you by mouth (orally) or through an IV.  What happens during the procedure?  A person receiving a blood transfusion through a vein in the arm.  An IV will be inserted into one of your veins.  The bag of blood will be attached to your IV. The blood will then enter through your vein.  Your temperature, blood pressure, and pulse will be monitored during the transfusion. This monitoring is done to detect early signs of a transfusion reaction.  Tell your nurse right away if you have any of these symptoms during the transfusion:  Shortness of breath or trouble breathing.  Chest or back pain.  Fever or chills.  Itching or hives.  If you have any signs or symptoms of a reaction, your transfusion will be stopped and you may be given medicine.  When the transfusion is complete, your IV will be removed.  Pressure may be applied to the IV site for a few minutes.  A bandage (dressing)will be applied.  The procedure may vary among health care providers and hospitals.    What happens after the procedure?  Your temperature, blood pressure, pulse, breathing rate, and blood oxygen level will be monitored until you leave the hospital or clinic.  Your blood may be tested to see how you have responded to the transfusion.  You may be warmed with fluids or blankets to maintain a normal body temperature.  If you receive your blood transfusion in an outpatient setting, you will be told whom to contact to report any reactions.  Where to find more information  Visit the [place]: [email or web address]  Summary  A blood transfusion is a procedure in which you receive blood or a type of blood cell (blood component) through an IV.  The blood given in a transfusion may be made up of different blood components. You may receive red blood cells, platelets, plasma, or white blood cells depending on the condition treated.  Your temperature, blood pressure, and pulse will be monitored before, during, and after the transfusion.  After the transfusion, your blood may be tested to see how your body has responded.  This information is not intended to replace advice given to you by your health care provider. Make sure you discuss any questions you have with your health care provider.

## 2024-03-04 NOTE — ED PROVIDER NOTE - CONSTITUTIONAL, MLM
normal... Pale. In wheelchair. awake, alert, oriented to person, place, time/situation and in no apparent distress.

## 2024-03-04 NOTE — ED ADULT NURSE NOTE - NSFALLRISKINTERV_ED_ALL_ED

## 2024-03-04 NOTE — ED ADULT TRIAGE NOTE - CHIEF COMPLAINT QUOTE
pt complaining of weakness and needing a blood transfusion.  pt states his hemaglobin is 7.8.  pt was recently admitted to  for 3 weeks.  pt is wheelchair bound.

## 2024-03-04 NOTE — ED PROVIDER NOTE - CRITICAL CARE ATTENDING CONTRIBUTION TO CARE
Critical care time spent evaluating and reevaluating patient, ordering and interpreting diagnostics, ordering therapeutics, speaking to pt and family, reviewing old records and documenting. This was a shared visit with MIR Stone Pt eval treatment and plan contemporaneously with her. I take responsibility for the care of this patient. Agree with notes. Yvette SAN

## 2024-03-04 NOTE — ED ADULT NURSE REASSESSMENT NOTE - NS ED NURSE REASSESS COMMENT FT1
received pt report from Day Rn first unit PRBC completed, no reaction. pt and family upset about meal being burned charge Nurse SYBILM all aware. second unit PRBC started at 2006 and completed at 2113 pt tolerated transfusion no reaction noted. IV d/c and pt discharged home with family. discharge instructions explained to pt and family.

## 2024-03-04 NOTE — ED ADULT NURSE REASSESSMENT NOTE - NS ED NURSE REASSESS COMMENT FT1
pt educated on s/s of blood tranfsusion reaction such as chest pain, sob, hives, rash, swelling to throat face limbs. pt aware to notify staff if symptoms occur. pt educated on blood type due to not previously knowing and is in agreement to blood consent at this time which was obtained by [place]

## 2024-03-04 NOTE — ED ADULT NURSE NOTE - OBJECTIVE STATEMENT
pt referred by doctor kristie due to abnormal labs. pt reports hemoglobin 7.8. pt complaining of weakness, headache, decreased eating x week. pt was recently admitted to  for 3 weeks.  pt is wheelchair bound. pt denies chest pain/SOB. safety and comfort measures maintained. daughter and girlfriend at bedside.

## 2024-03-07 ENCOUNTER — APPOINTMENT (OUTPATIENT)
Dept: COLORECTAL SURGERY | Facility: CLINIC | Age: 74
End: 2024-03-07
Payer: MEDICARE

## 2024-03-07 PROCEDURE — 99024 POSTOP FOLLOW-UP VISIT: CPT

## 2024-03-07 NOTE — HISTORY OF PRESENT ILLNESS
[FreeTextEntry1] : Patient is s/p laparoscopic loop sigmoid colostomy for iatrogenic extraperitoneal rectal injury (trocar from attempted suprapubic catheter insertion).  His colostomy was functioning normally until 2-3 days ago when he noticed that stool output stopped.  Some pain yesterday.  He denies any nausea/vomiting and has been tolerating PO intake.  He tried Dulcolax without much effect from the ostomy, but he noted that some stool came from his rectum overnight.  Today he denies any abdominal pain or distention.  Reports that stoma is pink and healthy in appearance otherwise.  10 minutes spent with patient and his daughter Cori [phone] on the phone.

## 2024-03-07 NOTE — REASON FOR VISIT
[Medical Office: ([facility])___] : at the medical office located in  [Home] : at home, [unfilled] , at the time of the visit. [Verbal consent obtained from patient] : the patient, [unfilled] [Family Member] : family member [Post Op: _________] : a [unfilled] post op visit

## 2024-03-07 NOTE — PLAN
[TextEntry] : -- Advised patient that occasional passage of stool/mucus from the rectum is normal even in patients who are diverted.  Expect that some of this output was already present in his distal sigmoid colon in previously, plus some mucus/mucosa that has been shed in the interim.  No dissection of the pelvic area was conducted, so unlikely any injury to nerves that would cause any change in sensation.  Advised that if he feels the need to pass a BM, he this would not be unexpected and he should do this over the toilet. -- Possibly there is some constipation proximal to the ostomy.  Advised addition of a stool softener such as Miralax and/or Colace and increasing his water intake to promote bowel function.  If develops nausea/vomiting or worsening abdominal pain, he was advised to present to the hospital. -- He needs in-office follow up.  He and his family prefer the [place] location - can see me there next Friday. -- Will eventually need gastrografin enema to evaluate rectum for healing at 3 months postop (would be in early May 2024).

## 2024-03-13 ENCOUNTER — OFFICE (OUTPATIENT)
Dept: URBAN - METROPOLITAN AREA CLINIC 12 | Facility: CLINIC | Age: 74
Setting detail: OPHTHALMOLOGY
End: 2024-03-13
Payer: MEDICARE

## 2024-03-13 DIAGNOSIS — H31.011: ICD-10-CM

## 2024-03-13 DIAGNOSIS — Z96.1: ICD-10-CM

## 2024-03-13 DIAGNOSIS — H25.11: ICD-10-CM

## 2024-03-13 PROCEDURE — 99204 OFFICE O/P NEW MOD 45 MIN: CPT | Performed by: OPHTHALMOLOGY

## 2024-03-13 PROCEDURE — 92250 FUNDUS PHOTOGRAPHY W/I&R: CPT | Performed by: OPHTHALMOLOGY

## 2024-03-14 ASSESSMENT — REFRACTION_CURRENTRX
OS_SPHERE: +0.25
OD_CYLINDER: -0.50
OD_OVR_VA: 20/
OD_AXIS: 065
OS_AXIS: 095
OS_VPRISM_DIRECTION: PROGS
OS_CYLINDER: -0.50
OS_OVR_VA: 20/
OD_SPHERE: +3.25
OD_ADD: +2.50
OD_VPRISM_DIRECTION: PROGS
OS_ADD: +2.50

## 2024-03-15 ENCOUNTER — APPOINTMENT (OUTPATIENT)
Dept: UROLOGY | Facility: CLINIC | Age: 74
End: 2024-03-15

## 2024-03-15 ENCOUNTER — APPOINTMENT (OUTPATIENT)
Dept: COLORECTAL SURGERY | Facility: CLINIC | Age: 74
End: 2024-03-15
Payer: MEDICARE

## 2024-03-15 VITALS
DIASTOLIC BLOOD PRESSURE: 79 MMHG | SYSTOLIC BLOOD PRESSURE: 136 MMHG | HEIGHT: 68 IN | RESPIRATION RATE: 15 BRPM | HEART RATE: 89 BPM | TEMPERATURE: 97.2 F | BODY MASS INDEX: 30.31 KG/M2 | WEIGHT: 200 LBS | OXYGEN SATURATION: 99 %

## 2024-03-15 DIAGNOSIS — Z93.3 COLOSTOMY STATUS: ICD-10-CM

## 2024-03-15 DIAGNOSIS — Z09 ENCOUNTER FOR FOLLOW-UP EXAMINATION AFTER COMPLETED TREATMENT FOR CONDITIONS OTHER THAN MALIGNANT NEOPLASM: ICD-10-CM

## 2024-03-15 PROCEDURE — 99024 POSTOP FOLLOW-UP VISIT: CPT

## 2024-03-15 NOTE — PLAN
[TextEntry] : -- Advised patient to reach out to his home health RN for recommendations regarding appliances with filters or additives that can help mitigate smell.  Can use ostomy deodorant drops.  For now he has a regular appliance without any of the above. -- Stool is extremely firm - advise regular bowel regimen, fiber, hydration, and Miralax/Colace as needed.  Advised to minimize regular use of stimulant laxatives. -- Follow up in clinic in ~2 months for repeat evaluation.  He acknowledges that stoma is much easier to care for than needing to be constantly transferred to the toilet.  Advised that stoma may ultimately be beneficial in this regard, instead of considering reversal.

## 2024-03-15 NOTE — HISTORY OF PRESENT ILLNESS
[FreeTextEntry1] : 3/15/2024: Presents for in-office follow up.  A day after telephone visit last week his ostomy began working again.  Not currently on any bowel regimen.  No further passage of stool from below.  Stoma with minimal output over last day.  Denies N/V.  He is currently most bothered by the smell, though he acknowledges that it has been easier for his family to care for stoma than to have to transfer him to the toilet on a daily basis.  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  His colostomy was functioning normally until 2-3 days ago when he noticed that stool output stopped.  Some pain yesterday.  He denies any nausea/vomiting and has been tolerating PO intake.  He tried Dulcolax without much effect from the ostomy, but he noted that some stool came from his rectum overnight.  Today he denies any abdominal pain or distention.  Reports that stoma is pink and healthy in appearance otherwise.  10 minutes spent with patient and his daughter Cori [phone] on the phone.

## 2024-03-15 NOTE — PHYSICAL EXAM
[Respiratory Effort] : normal respiratory effort [JVD] : no jugular venous distention  [Normal Rate and Rhythm] : normal rate and rhythm [No Edema] : No edema [No Rash or Lesion] : No rash or lesion [Oriented to Person] : oriented to person [Alert] : alert [Oriented to Place] : oriented to place [Oriented to Time] : oriented to time [Calm] : calm [de-identified] : Soft, nontender, nondistended.  Ostomy to LLQ, pink and well-perfused, firm pellets and gas in appliance. [de-identified] : No distress [de-identified] : Normocephalic [de-identified] : Moves all extremities

## 2024-03-16 ENCOUNTER — OFFICE (OUTPATIENT)
Dept: URBAN - METROPOLITAN AREA CLINIC 12 | Facility: CLINIC | Age: 74
Setting detail: OPHTHALMOLOGY
End: 2024-03-16
Payer: MEDICARE

## 2024-03-16 DIAGNOSIS — H25.13: ICD-10-CM

## 2024-03-16 DIAGNOSIS — H25.11: ICD-10-CM

## 2024-03-16 PROCEDURE — 99213 OFFICE O/P EST LOW 20 MIN: CPT | Performed by: OPHTHALMOLOGY

## 2024-03-16 PROCEDURE — 92136 OPHTHALMIC BIOMETRY: CPT | Mod: 26,RT | Performed by: OPHTHALMOLOGY

## 2024-03-16 PROCEDURE — 92136 OPHTHALMIC BIOMETRY: CPT | Mod: TC | Performed by: OPHTHALMOLOGY

## 2024-03-16 ASSESSMENT — REFRACTION_CURRENTRX
OD_ADD: +2.50
OS_CYLINDER: -0.50
OD_OVR_VA: 20/
OS_AXIS: 095
OS_SPHERE: +0.25
OD_CYLINDER: -0.50
OD_VPRISM_DIRECTION: PROGS
OS_VPRISM_DIRECTION: PROGS
OD_SPHERE: +3.25
OS_OVR_VA: 20/
OS_ADD: +2.50
OD_AXIS: 065

## 2024-03-19 ENCOUNTER — OFFICE (OUTPATIENT)
Dept: URBAN - METROPOLITAN AREA CLINIC 88 | Facility: CLINIC | Age: 74
Setting detail: OPHTHALMOLOGY
End: 2024-03-19
Payer: MEDICARE

## 2024-03-19 ENCOUNTER — NON-APPOINTMENT (OUTPATIENT)
Age: 74
End: 2024-03-19

## 2024-03-19 DIAGNOSIS — E11.3393: ICD-10-CM

## 2024-03-19 DIAGNOSIS — H35.322: ICD-10-CM

## 2024-03-19 DIAGNOSIS — H35.3111: ICD-10-CM

## 2024-03-19 DIAGNOSIS — H31.011: ICD-10-CM

## 2024-03-19 DIAGNOSIS — H43.813: ICD-10-CM

## 2024-03-19 PROCEDURE — 92134 CPTRZ OPH DX IMG PST SGM RTA: CPT | Performed by: OPHTHALMOLOGY

## 2024-03-19 PROCEDURE — 99214 OFFICE O/P EST MOD 30 MIN: CPT | Performed by: OPHTHALMOLOGY

## 2024-03-19 ASSESSMENT — REFRACTION_CURRENTRX
OD_SPHERE: +3.25
OD_AXIS: 065
OD_CYLINDER: -0.50
OS_AXIS: 095
OS_SPHERE: +0.25
OD_OVR_VA: 20/
OS_CYLINDER: -0.50
OS_VPRISM_DIRECTION: PROGS
OS_OVR_VA: 20/
OS_ADD: +2.50
OD_VPRISM_DIRECTION: PROGS
OD_ADD: +2.50

## 2024-03-20 ENCOUNTER — APPOINTMENT (OUTPATIENT)
Dept: UROLOGY | Facility: CLINIC | Age: 74
End: 2024-03-20
Payer: MEDICARE

## 2024-03-20 DIAGNOSIS — N40.1 BENIGN PROSTATIC HYPERPLASIA WITH LOWER URINARY TRACT SYMPMS: ICD-10-CM

## 2024-03-20 DIAGNOSIS — N13.8 BENIGN PROSTATIC HYPERPLASIA WITH LOWER URINARY TRACT SYMPMS: ICD-10-CM

## 2024-03-20 DIAGNOSIS — R33.9 RETENTION OF URINE, UNSPECIFIED: ICD-10-CM

## 2024-03-20 PROCEDURE — 99214 OFFICE O/P EST MOD 30 MIN: CPT

## 2024-03-20 RX ORDER — LEVOFLOXACIN 250 MG/1
250 TABLET, FILM COATED ORAL DAILY
Qty: 8 | Refills: 0 | Status: ACTIVE | COMMUNITY
Start: 2024-03-20 | End: 1900-01-01

## 2024-03-20 NOTE — ASSESSMENT
[FreeTextEntry1] : Reviewed records. Discussed labs and imaging.   3/14/2024: Creatinine: 2.89  Benign Prostatic Hyperplasia: Incomplete bladder emptying: Adjusted the suprapubic catheter. Obtained urine specimen from the catheter after cleaning the port with alcohol swab. Will get Urinalysis and Urine culture.  Will start Levofloxacin 250 mg, will renally dose: 2 tablets first day and then 1 daily until complete. Will try and arrange for suprapubic catheter change with intervention radiology at [facility] on 3/25/2024.

## 2024-03-20 NOTE — HISTORY OF PRESENT ILLNESS
[FreeTextEntry1] : 73-year-old male presents for follow-up. Bothered by suprapubic catheter pushing on his abdominal skin. Also complaining of on and off suprapubic pain. Has visiting nurse service to do catheter care. Saw colorectal surgeon for ostomy.  Status post suprapubic catheter placement by interventional radiology on 2/14/2024 at [facility]. Status post cystoscopy and attempted suprapubic catheter placement complicated by rectal injury on 2/5/2024 at [facility]. During cystoscopy no urethral stricture was seen, patient had severe bi lobar prostatic enlargement with coaptation. Status post laparoscopic diverting loop transverse colostomy by colorectal surgeon on 2/5/2024 at [facility].  Patient had longstanding history of lower urinary tract symptoms.  Was following with another urologist. Not responsive to medical therapy.  Status post UroLift few years ago. Continued to have bother.  Was mentioned concern for bladder dysfunction and urethral stricture. Was recommended suprapubic catheter.

## 2024-03-20 NOTE — PHYSICAL EXAM
[Normal Appearance] : normal appearance [General Appearance - In No Acute Distress] : no acute distress [] : no respiratory distress [Abdomen Soft] : soft [Oriented To Time, Place, And Person] : oriented to person, place, and time [de-identified] : Suprapubic catheter intact, tubing kinked [de-identified] : Wheelchair dependent

## 2024-03-21 ENCOUNTER — NON-APPOINTMENT (OUTPATIENT)
Age: 74
End: 2024-03-21

## 2024-03-22 LAB
APPEARANCE: ABNORMAL
BACTERIA: ABNORMAL /HPF
BILIRUBIN URINE: NEGATIVE
BLOOD URINE: ABNORMAL
CAST: 15 /LPF
COLOR: YELLOW
EPITHELIAL CELLS: 2 /HPF
GLUCOSE QUALITATIVE U: 100 MG/DL
HYALINE CASTS: PRESENT
KETONES URINE: NEGATIVE MG/DL
LEUKOCYTE ESTERASE URINE: ABNORMAL
MICROSCOPIC-UA: NORMAL
NITRITE URINE: NEGATIVE
PH URINE: 6.5
PROTEIN URINE: 300 MG/DL
RED BLOOD CELLS URINE: 5 /HPF
REVIEW: NORMAL
SPECIFIC GRAVITY URINE: 1.01
UROBILINOGEN URINE: 0.2 MG/DL
WHITE BLOOD CELLS URINE: 313 /HPF

## 2024-03-23 LAB
CULTURE RESULTS: SIGNIFICANT CHANGE UP
SPECIMEN SOURCE: SIGNIFICANT CHANGE UP

## 2024-03-26 ENCOUNTER — ASC (OUTPATIENT)
Dept: URBAN - METROPOLITAN AREA SURGERY 8 | Facility: SURGERY | Age: 74
Setting detail: OPHTHALMOLOGY
End: 2024-03-26
Payer: MEDICARE

## 2024-03-26 DIAGNOSIS — H25.11: ICD-10-CM

## 2024-03-26 LAB — BACTERIA UR CULT: NORMAL

## 2024-03-26 PROCEDURE — 66984 XCAPSL CTRC RMVL W/O ECP: CPT | Mod: 54,RT | Performed by: OPHTHALMOLOGY

## 2024-03-26 PROCEDURE — 68841 INSJ RX ELUT IMPLT LAC CANAL: CPT | Mod: RT | Performed by: OPHTHALMOLOGY

## 2024-03-27 ENCOUNTER — OFFICE (OUTPATIENT)
Dept: URBAN - METROPOLITAN AREA CLINIC 12 | Facility: CLINIC | Age: 74
Setting detail: OPHTHALMOLOGY
End: 2024-03-27
Payer: MEDICARE

## 2024-03-27 ENCOUNTER — RX ONLY (RX ONLY)
Age: 74
End: 2024-03-27

## 2024-03-27 DIAGNOSIS — Z96.1: ICD-10-CM

## 2024-03-27 PROBLEM — H31.011 MACULAR SCAR; RIGHT EYE: Status: ACTIVE | Noted: 2024-03-13

## 2024-03-27 PROBLEM — H35.3111 AGE RELATED MACULAR DEGENERATION DRY; RIGHT EYE EARLY: Status: ACTIVE | Noted: 2024-03-13

## 2024-03-27 PROBLEM — H43.813 POSTERIOR VITREOUS DETACHMENT; BOTH EYES: Status: ACTIVE | Noted: 2024-03-19

## 2024-03-27 PROBLEM — H35.322 AGE RELATED MACULAR DEGENERATION WET; LEFT EYE: Status: ACTIVE | Noted: 2024-03-19

## 2024-03-27 PROBLEM — E11.3393 DM TYPE 2; BOTH MOD WITHOUT ME: Status: ACTIVE | Noted: 2024-03-19

## 2024-03-27 PROCEDURE — 99024 POSTOP FOLLOW-UP VISIT: CPT | Performed by: OPTOMETRIST

## 2024-03-27 ASSESSMENT — REFRACTION_CURRENTRX
OS_VPRISM_DIRECTION: PROGS
OS_OVR_VA: 20/
OS_AXIS: 095
OS_CYLINDER: -0.50
OD_CYLINDER: -0.50
OD_VPRISM_DIRECTION: PROGS
OD_SPHERE: +3.25
OD_ADD: +2.50
OD_AXIS: 065
OD_OVR_VA: 20/
OS_ADD: +2.50
OS_SPHERE: +0.25

## 2024-04-08 ENCOUNTER — TRANSCRIPTION ENCOUNTER (OUTPATIENT)
Age: 74
End: 2024-04-08

## 2024-04-08 ENCOUNTER — RESULT REVIEW (OUTPATIENT)
Age: 74
End: 2024-04-08

## 2024-04-08 ENCOUNTER — OUTPATIENT (OUTPATIENT)
Dept: INPATIENT UNIT | Facility: HOSPITAL | Age: 74
LOS: 1 days | Discharge: HOME | End: 2024-04-08
Payer: MEDICARE

## 2024-04-08 VITALS
WEIGHT: 199.96 LBS | OXYGEN SATURATION: 100 % | RESPIRATION RATE: 18 BRPM | HEIGHT: 68 IN | SYSTOLIC BLOOD PRESSURE: 176 MMHG | DIASTOLIC BLOOD PRESSURE: 81 MMHG | HEART RATE: 78 BPM | TEMPERATURE: 98 F

## 2024-04-08 VITALS
DIASTOLIC BLOOD PRESSURE: 79 MMHG | SYSTOLIC BLOOD PRESSURE: 179 MMHG | TEMPERATURE: 98 F | RESPIRATION RATE: 16 BRPM | OXYGEN SATURATION: 100 % | HEART RATE: 80 BPM

## 2024-04-08 DIAGNOSIS — Z93.59 OTHER CYSTOSTOMY STATUS: Chronic | ICD-10-CM

## 2024-04-08 DIAGNOSIS — Z45.89 ENCOUNTER FOR ADJUSTMENT AND MANAGEMENT OF OTHER IMPLANTED DEVICES: ICD-10-CM

## 2024-04-08 DIAGNOSIS — N13.30 UNSPECIFIED HYDRONEPHROSIS: ICD-10-CM

## 2024-04-08 DIAGNOSIS — Z93.3 COLOSTOMY STATUS: Chronic | ICD-10-CM

## 2024-04-08 LAB
ANION GAP SERPL CALC-SCNC: 8 MMOL/L — SIGNIFICANT CHANGE UP (ref 5–17)
BUN SERPL-MCNC: 54 MG/DL — HIGH (ref 7–23)
CALCIUM SERPL-MCNC: 8 MG/DL — LOW (ref 8.5–10.1)
CHLORIDE SERPL-SCNC: 109 MMOL/L — HIGH (ref 96–108)
CO2 SERPL-SCNC: 23 MMOL/L — SIGNIFICANT CHANGE UP (ref 22–31)
CREAT SERPL-MCNC: 2.9 MG/DL — HIGH (ref 0.5–1.3)
EGFR: 22 ML/MIN/1.73M2 — LOW
GLUCOSE SERPL-MCNC: 157 MG/DL — HIGH (ref 70–99)
HCT VFR BLD CALC: 30.5 % — LOW (ref 39–50)
HGB BLD-MCNC: 9.5 G/DL — LOW (ref 13–17)
INR BLD: 1.04 RATIO — SIGNIFICANT CHANGE UP (ref 0.85–1.18)
MCHC RBC-ENTMCNC: 28.8 PG — SIGNIFICANT CHANGE UP (ref 27–34)
MCHC RBC-ENTMCNC: 31.1 GM/DL — LOW (ref 32–36)
MCV RBC AUTO: 92.4 FL — SIGNIFICANT CHANGE UP (ref 80–100)
PLATELET # BLD AUTO: 177 K/UL — SIGNIFICANT CHANGE UP (ref 150–400)
POTASSIUM SERPL-MCNC: 3.5 MMOL/L — SIGNIFICANT CHANGE UP (ref 3.5–5.3)
POTASSIUM SERPL-SCNC: 3.5 MMOL/L — SIGNIFICANT CHANGE UP (ref 3.5–5.3)
PROTHROM AB SERPL-ACNC: 11.7 SEC — SIGNIFICANT CHANGE UP (ref 9.5–13)
RBC # BLD: 3.3 M/UL — LOW (ref 4.2–5.8)
RBC # FLD: 14.6 % — HIGH (ref 10.3–14.5)
SODIUM SERPL-SCNC: 140 MMOL/L — SIGNIFICANT CHANGE UP (ref 135–145)
WBC # BLD: 5.69 K/UL — SIGNIFICANT CHANGE UP (ref 3.8–10.5)
WBC # FLD AUTO: 5.69 K/UL — SIGNIFICANT CHANGE UP (ref 3.8–10.5)

## 2024-04-08 PROCEDURE — 36415 COLL VENOUS BLD VENIPUNCTURE: CPT

## 2024-04-08 PROCEDURE — 80048 BASIC METABOLIC PNL TOTAL CA: CPT

## 2024-04-08 PROCEDURE — 85027 COMPLETE CBC AUTOMATED: CPT

## 2024-04-08 PROCEDURE — C1769: CPT

## 2024-04-08 PROCEDURE — 75984 XRAY CONTROL CATHETER CHANGE: CPT | Mod: 26

## 2024-04-08 PROCEDURE — 51705 CHANGE OF BLADDER TUBE: CPT

## 2024-04-08 PROCEDURE — 85610 PROTHROMBIN TIME: CPT

## 2024-04-08 PROCEDURE — 75984 XRAY CONTROL CATHETER CHANGE: CPT

## 2024-04-08 PROCEDURE — 87086 URINE CULTURE/COLONY COUNT: CPT

## 2024-04-08 RX ORDER — HYDRALAZINE HCL 50 MG
5 TABLET ORAL ONCE
Refills: 0 | Status: COMPLETED | OUTPATIENT
Start: 2024-04-08 | End: 2024-04-08

## 2024-04-08 RX ADMIN — Medication 5 MILLIGRAM(S): at 10:55

## 2024-04-08 NOTE — ASU PATIENT PROFILE, ADULT - FALL HARM RISK - UNIVERSAL INTERVENTIONS
Bed in lowest position, wheels locked, appropriate side rails in place/Call bell, personal items and telephone in reach/Instruct patient to call for assistance before getting out of bed or chair/Non-slip footwear when patient is out of bed/[place] to call system/Physically safe environment - no spills, clutter or unnecessary equipment/Purposeful Proactive Rounding/Room/bathroom lighting operational, light cord in reach

## 2024-04-08 NOTE — ASU PATIENT PROFILE, ADULT - NS TRANSFER PATIENT BELONGINGS
Urodynamic Report    Date; 09/13/18  Indication:PUV    Procedure:   Complex CMG    EMG with patch pads    Intra-abdominal pressure monitoring by rectal balloon    Fluroscopy      (Fluoro-urodynamics (FUDS))    Surgeon:  Kendrick Sawyer MD    Complications: none    Urine showed no evidence of infection.    Procedure in Detail:    Patient was taken to the Urodynamic Suite.   The patient was prepped and the urinary residual was drained with the 9 Fr dual lumen catheter which was placed to measure intravesical pressures.  A 10 Fr balloon manometer was placed into the rectum for abdominal pressure measurements.  Patch EMG electrodes were placed on the perineum.  The patient was connected to the [facility] Urodynamic machineand using a multichannel technique, the data were interpreted.  The bladder was filled with contrast liquid at room temperature at a rate of 30 ml/min.  Patient is filled to 400 ml.  Filling is performed with the patient in the sitting  position.   Periodic fluoroscopy was performed.     The following are the results of the study:    Uroflow       Q max: 16.2 ml/sec       Voided volume: 86.7 ml       Pattern of the curve: bell      PVR:0    CMG       Sensation: None         First Desire: none         Normal Desire:none         Strong Desire:none         Urgency:none       Capacity:400 ml       Abnormal Contractions:none       Compliance: good     Abdominal Leak Point Pressure:       Valsalva: no leak       Cough:     Detrusor Leak Point Pressure. 400 ml 42 cm water      EMG: no DSD    Fluoroscopy: Mild trabeculetion        Voiding phase       Q max: 30.4       P det at Q max:48       Pattern of the curve: extended       Voided volume:400 ml       PVR: min    Analysis:      Recommendations:       A.void every 3 hrs       b.       c.       wheelchair in ASU/Clothing

## 2024-04-08 NOTE — PROVIDER CONTACT NOTE (OTHER) - SITUATION
Patient refusing to change into gown on arrival,  Refusing IV placement at this time.  Refusing to confirm last medications taken.

## 2024-04-08 NOTE — PROVIDER CONTACT NOTE (OTHER) - ACTION/TREATMENT ORDERED:
Patient eventually changed into gown prior to leaving the floor.    Wheelchair left at bedside - patient refusing to place wc at the , stating "wc needs to be at the bedside when I arrive bk

## 2024-04-08 NOTE — PROVIDER CONTACT NOTE (OTHER) - BACKGROUND
Spoke with Samantha In IR of situation.     Samantha stating "Pateint needs to be in gown during IR procedure"

## 2024-04-09 ENCOUNTER — NON-APPOINTMENT (OUTPATIENT)
Age: 74
End: 2024-04-09

## 2024-04-10 LAB
CULTURE RESULTS: SIGNIFICANT CHANGE UP
SPECIMEN SOURCE: SIGNIFICANT CHANGE UP

## 2024-05-09 ENCOUNTER — NON-APPOINTMENT (OUTPATIENT)
Age: 74
End: 2024-05-09

## 2024-05-13 ENCOUNTER — TRANSCRIPTION ENCOUNTER (OUTPATIENT)
Age: 74
End: 2024-05-13

## 2024-05-13 ENCOUNTER — RESULT REVIEW (OUTPATIENT)
Age: 74
End: 2024-05-13

## 2024-05-13 ENCOUNTER — OUTPATIENT (OUTPATIENT)
Dept: INPATIENT UNIT | Facility: HOSPITAL | Age: 74
LOS: 1 days | Discharge: HOME | End: 2024-05-13
Payer: MEDICARE

## 2024-05-13 VITALS
TEMPERATURE: 99 F | DIASTOLIC BLOOD PRESSURE: 89 MMHG | OXYGEN SATURATION: 100 % | HEART RATE: 85 BPM | SYSTOLIC BLOOD PRESSURE: 185 MMHG | RESPIRATION RATE: 16 BRPM

## 2024-05-13 VITALS
HEIGHT: 68 IN | HEART RATE: 84 BPM | TEMPERATURE: 98 F | OXYGEN SATURATION: 100 % | RESPIRATION RATE: 14 BRPM | WEIGHT: 199.96 LBS
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
## 2024-05-13 DIAGNOSIS — T83.030A LEAKAGE OF CYSTOSTOMY CATHETER, INITIAL ENCOUNTER: ICD-10-CM

## 2024-05-13 DIAGNOSIS — Z93.59 OTHER CYSTOSTOMY STATUS: Chronic | ICD-10-CM

## 2024-05-13 DIAGNOSIS — R33.9 RETENTION OF URINE, UNSPECIFIED: ICD-10-CM
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
## 2024-05-13 DIAGNOSIS — Z96.641 PRESENCE OF RIGHT ARTIFICIAL HIP JOINT: Chronic | ICD-10-CM

## 2024-05-13 DIAGNOSIS — Z93.3 COLOSTOMY STATUS: Chronic | ICD-10-CM

## 2024-05-13 DIAGNOSIS — Z98.890 OTHER SPECIFIED POSTPROCEDURAL STATES: Chronic | ICD-10-CM

## 2024-05-13 LAB
ANION GAP SERPL CALC-SCNC: 8 MMOL/L — SIGNIFICANT CHANGE UP (ref 5–17)
BUN SERPL-MCNC: 69 MG/DL — HIGH (ref 7–23)
CALCIUM SERPL-MCNC: 8 MG/DL — LOW (ref 8.5–10.1)
CHLORIDE SERPL-SCNC: 106 MMOL/L — SIGNIFICANT CHANGE UP (ref 96–108)
CO2 SERPL-SCNC: 23 MMOL/L — SIGNIFICANT CHANGE UP (ref 22–31)
CREAT SERPL-MCNC: 3.06 MG/DL — HIGH (ref 0.5–1.3)
EGFR: 21 ML/MIN/1.73M2 — LOW
GLUCOSE SERPL-MCNC: 113 MG/DL — HIGH (ref 70–99)
HCT VFR BLD CALC: 34.9 % — LOW (ref 39–50)
HGB BLD-MCNC: 11.1 G/DL — LOW (ref 13–17)
INR BLD: 1.05 RATIO — SIGNIFICANT CHANGE UP (ref 0.85–1.18)
MCHC RBC-ENTMCNC: 29.1 PG — SIGNIFICANT CHANGE UP (ref 27–34)
MCHC RBC-ENTMCNC: 31.8 GM/DL — LOW (ref 32–36)
MCV RBC AUTO: 91.4 FL — SIGNIFICANT CHANGE UP (ref 80–100)
PLATELET # BLD AUTO: 217 K/UL — SIGNIFICANT CHANGE UP (ref 150–400)
POTASSIUM SERPL-MCNC: 3.5 MMOL/L — SIGNIFICANT CHANGE UP (ref 3.5–5.3)
POTASSIUM SERPL-SCNC: 3.5 MMOL/L — SIGNIFICANT CHANGE UP (ref 3.5–5.3)
PROTHROM AB SERPL-ACNC: 11.8 SEC — SIGNIFICANT CHANGE UP (ref 9.5–13)
RBC # BLD: 3.82 M/UL — LOW (ref 4.2–5.8)
RBC # FLD: 14.4 % — SIGNIFICANT CHANGE UP (ref 10.3–14.5)
SODIUM SERPL-SCNC: 137 MMOL/L — SIGNIFICANT CHANGE UP (ref 135–145)
WBC # BLD: 8.81 K/UL — SIGNIFICANT CHANGE UP (ref 3.8–10.5)
WBC # FLD AUTO: 8.81 K/UL — SIGNIFICANT CHANGE UP (ref 3.8–10.5)

## 2024-05-13 PROCEDURE — 75984 XRAY CONTROL CATHETER CHANGE: CPT

## 2024-05-13 PROCEDURE — C1887: CPT

## 2024-05-13 PROCEDURE — 80048 BASIC METABOLIC PNL TOTAL CA: CPT

## 2024-05-13 PROCEDURE — 51705 CHANGE OF BLADDER TUBE: CPT

## 2024-05-13 PROCEDURE — 75984 XRAY CONTROL CATHETER CHANGE: CPT | Mod: 26

## 2024-05-13 PROCEDURE — 85610 PROTHROMBIN TIME: CPT

## 2024-05-13 PROCEDURE — 36415 COLL VENOUS BLD VENIPUNCTURE: CPT

## 2024-05-13 PROCEDURE — C1769: CPT

## 2024-05-13 PROCEDURE — 85027 COMPLETE CBC AUTOMATED: CPT

## 2024-05-13 RX ORDER — SODIUM CHLORIDE 9 MG/ML
1000 INJECTION INTRAMUSCULAR; INTRAVENOUS; SUBCUTANEOUS
Refills: 0 | Status: DISCONTINUED | OUTPATIENT
Start: 2024-05-13 | End: 2024-05-13

## 2024-05-13 RX ORDER — ACETAMINOPHEN 500 MG
1000 TABLET ORAL ONCE
Refills: 0 | Status: DISCONTINUED | OUTPATIENT
Start: 2024-05-13 | End: 2024-05-13

## 2024-05-13 RX ORDER — FENTANYL CITRATE 50 UG/ML
25 INJECTION INTRAVENOUS
Refills: 0 | Status: DISCONTINUED | OUTPATIENT
Start: 2024-05-13 | End: 2024-05-13

## 2024-05-13 RX ORDER — TRIMETHOPRIM HYDROCHLORIDE 50 MG/5ML
1 SOLUTION ORAL
Refills: 0 | DISCHARGE

## 2024-05-13 RX ORDER — ONDANSETRON 8 MG/1
4 TABLET, FILM COATED ORAL EVERY 6 HOURS
Refills: 0 | Status: DISCONTINUED | OUTPATIENT
Start: 2024-05-13 | End: 2024-05-13

## 2024-05-13 RX ORDER — DESMOPRESSIN ACETATE 0.1 MG/1
1 TABLET ORAL
Refills: 0 | DISCHARGE

## 2024-05-13 RX ORDER — SILODOSIN 4 MG/1
1 CAPSULE ORAL
Refills: 0 | DISCHARGE

## 2024-05-13 RX ORDER — TAMSULOSIN HYDROCHLORIDE 0.4 MG/1
1 CAPSULE ORAL
Refills: 0 | DISCHARGE

## 2024-05-13 RX ORDER — OXYCODONE HYDROCHLORIDE 5 MG/1
5 TABLET ORAL ONCE
Refills: 0 | Status: DISCONTINUED | OUTPATIENT
Start: 2024-05-13 | End: 2024-05-13

## 2024-05-13 NOTE — ASU PATIENT PROFILE, ADULT - NSICDXPASTSURGICALHX_GEN_ALL_CORE_FT
PAST SURGICAL HISTORY:  H/O total hip arthroplasty, right Bilat    S/P colostomy     S/P ORIF (open reduction internal fixation) fracture b/l legs    Suprapubic catheter

## 2024-05-13 NOTE — ASU PATIENT PROFILE, ADULT - FALL HARM RISK - HARM RISK INTERVENTIONS

## 2024-05-13 NOTE — ASU PREOP CHECKLIST - TEMPERATURE IN CELSIUS (DEGREES C)
Initial Anesthesia Post-op Note    Patient: Curtis Rodriguez  Procedure(s) Performed: COLONOSCOPY  Anesthesia type: Monitor Anesthesia Care    Vital Last Value   Temperature 36.2 °C (97.2 °F) (06/07/17 0903)   Pulse 70 (06/07/17 0925)   Respiratory Rate 23 (06/07/17 0925)   Non-Invasive   Blood Pressure (!) 125/94 (06/07/17 0920)   Arterial  Blood Pressure     Pulse Oximetry 100 % (06/07/17 0925)     Last 24 I/O:   Intake/Output Summary (Last 24 hours) at 06/07/17 1151  Last data filed at 06/07/17 0928   Gross per 24 hour   Intake              890 ml   Output                0 ml   Net              890 ml       PATIENT LOCATION: PACU Phase 1  POST-OP VITAL SIGNS: stable  LEVEL OF CONSCIOUSNESS: sedated  RESPIRATORY STATUS: spontaneous ventilation  CARDIOVASCULAR: blood pressure returned to baseline and stable  PAIN MANAGEMENT: adequately controlled  NAUSEA: None  AIRWAY PATENCY: patent  POST-OP ASSESSMENT: no complications and patient tolerated procedure well with no complications  COMPLICATIONS: none  Comments: Report to RN, questions answered.  HANDOFF:  Handoff to receiving nurse was performed and questions were answered       36.4

## 2024-05-13 NOTE — ASU PATIENT PROFILE, ADULT - FALL HARM RISK - CONCLUSION
Call Center TCM Note    Flowsheet Row Responses   [facility] facility patient discharged from? [place]   Does the patient have one of the following disease processes/diagnoses(primary or secondary)? Stroke   TCM attempt successful? No  [No verbal release]   Unsuccessful attempts Attempt 3          Falguni Lubin RN    11/23/2022, 08:06 CST       Fall with Harm Risk

## 2024-05-15 ENCOUNTER — NON-APPOINTMENT (OUTPATIENT)
Age: 74
End: 2024-05-15

## 2024-06-04 ENCOUNTER — NON-APPOINTMENT (OUTPATIENT)
Age: 74
End: 2024-06-04

## 2024-06-26 ENCOUNTER — OUTPATIENT (OUTPATIENT)
Dept: INPATIENT UNIT | Facility: HOSPITAL | Age: 74
LOS: 1 days | Discharge: HOME | End: 2024-06-26
Payer: MEDICARE

## 2024-06-26 ENCOUNTER — RESULT REVIEW (OUTPATIENT)
Age: 74
End: 2024-06-26

## 2024-06-26 ENCOUNTER — TRANSCRIPTION ENCOUNTER (OUTPATIENT)
Age: 74
End: 2024-06-26

## 2024-06-26 VITALS
TEMPERATURE: 98 F | DIASTOLIC BLOOD PRESSURE: 78 MMHG | HEART RATE: 58 BPM | SYSTOLIC BLOOD PRESSURE: 120 MMHG | RESPIRATION RATE: 17 BRPM | OXYGEN SATURATION: 98 %

## 2024-06-26 VITALS
DIASTOLIC BLOOD PRESSURE: 78 MMHG | TEMPERATURE: 97 F | SYSTOLIC BLOOD PRESSURE: 115 MMHG | WEIGHT: 160.06 LBS | HEIGHT: 68 IN | OXYGEN SATURATION: 100 % | RESPIRATION RATE: 16 BRPM | HEART RATE: 65 BPM

## 2024-06-26 DIAGNOSIS — R33.9 RETENTION OF URINE, UNSPECIFIED: ICD-10-CM

## 2024-06-26 DIAGNOSIS — Z93.3 COLOSTOMY STATUS: Chronic | ICD-10-CM

## 2024-06-26 DIAGNOSIS — N32.0 BLADDER-NECK OBSTRUCTION: ICD-10-CM

## 2024-06-26 DIAGNOSIS — Z93.59 OTHER CYSTOSTOMY STATUS: Chronic | ICD-10-CM

## 2024-06-26 DIAGNOSIS — Z98.890 OTHER SPECIFIED POSTPROCEDURAL STATES: Chronic | ICD-10-CM

## 2024-06-26 DIAGNOSIS — Z96.641 PRESENCE OF RIGHT ARTIFICIAL HIP JOINT: Chronic | ICD-10-CM

## 2024-06-26 LAB
ANION GAP SERPL CALC-SCNC: 7 MMOL/L — SIGNIFICANT CHANGE UP (ref 5–17)
BUN SERPL-MCNC: 63 MG/DL — HIGH (ref 7–23)
CALCIUM SERPL-MCNC: 8 MG/DL — LOW (ref 8.5–10.1)
CHLORIDE SERPL-SCNC: 101 MMOL/L — SIGNIFICANT CHANGE UP (ref 96–108)
CO2 SERPL-SCNC: 30 MMOL/L — SIGNIFICANT CHANGE UP (ref 22–31)
CREAT SERPL-MCNC: 3.3 MG/DL — HIGH (ref 0.5–1.3)
EGFR: 19 ML/MIN/1.73M2 — LOW
GLUCOSE SERPL-MCNC: 98 MG/DL — SIGNIFICANT CHANGE UP (ref 70–99)
HCT VFR BLD CALC: 32 % — LOW (ref 39–50)
HGB BLD-MCNC: 10.3 G/DL — LOW (ref 13–17)
INR BLD: 1.1 RATIO — SIGNIFICANT CHANGE UP (ref 0.85–1.18)
MCHC RBC-ENTMCNC: 28.5 PG — SIGNIFICANT CHANGE UP (ref 27–34)
MCHC RBC-ENTMCNC: 32.2 GM/DL — SIGNIFICANT CHANGE UP (ref 32–36)
MCV RBC AUTO: 88.4 FL — SIGNIFICANT CHANGE UP (ref 80–100)
PLATELET # BLD AUTO: 157 K/UL — SIGNIFICANT CHANGE UP (ref 150–400)
POTASSIUM SERPL-MCNC: 3.8 MMOL/L — SIGNIFICANT CHANGE UP (ref 3.5–5.3)
POTASSIUM SERPL-SCNC: 3.8 MMOL/L — SIGNIFICANT CHANGE UP (ref 3.5–5.3)
PROTHROM AB SERPL-ACNC: 12.4 SEC — SIGNIFICANT CHANGE UP (ref 9.5–13)
RBC # BLD: 3.62 M/UL — LOW (ref 4.2–5.8)
RBC # FLD: 13.9 % — SIGNIFICANT CHANGE UP (ref 10.3–14.5)
SODIUM SERPL-SCNC: 138 MMOL/L — SIGNIFICANT CHANGE UP (ref 135–145)
WBC # BLD: 5.85 K/UL — SIGNIFICANT CHANGE UP (ref 3.8–10.5)
WBC # FLD AUTO: 5.85 K/UL — SIGNIFICANT CHANGE UP (ref 3.8–10.5)

## 2024-06-26 PROCEDURE — 80048 BASIC METABOLIC PNL TOTAL CA: CPT

## 2024-06-26 PROCEDURE — 93010 ELECTROCARDIOGRAM REPORT: CPT

## 2024-06-26 PROCEDURE — C1769: CPT

## 2024-06-26 PROCEDURE — 93005 ELECTROCARDIOGRAM TRACING: CPT | Mod: XU

## 2024-06-26 PROCEDURE — 77012 CT SCAN FOR NEEDLE BIOPSY: CPT | Mod: 26

## 2024-06-26 PROCEDURE — 36415 COLL VENOUS BLD VENIPUNCTURE: CPT

## 2024-06-26 PROCEDURE — 85027 COMPLETE CBC AUTOMATED: CPT

## 2024-06-26 PROCEDURE — 51102 DRAIN BL W/CATH INSERTION: CPT

## 2024-06-26 PROCEDURE — 77012 CT SCAN FOR NEEDLE BIOPSY: CPT

## 2024-06-26 PROCEDURE — 85610 PROTHROMBIN TIME: CPT

## 2024-06-26 RX ORDER — TAMSULOSIN HYDROCHLORIDE 0.4 MG/1
1 CAPSULE ORAL
Refills: 0 | DISCHARGE

## 2024-06-26 RX ORDER — SILODOSIN 4 MG/1
1 CAPSULE ORAL
Refills: 0 | DISCHARGE

## 2024-06-26 RX ORDER — OXYCODONE HYDROCHLORIDE 100 MG/5ML
10 SOLUTION ORAL ONCE
Refills: 0 | Status: DISCONTINUED | OUTPATIENT
Start: 2024-06-26 | End: 2024-06-26

## 2024-06-26 RX ORDER — TRIMETHOPRIM HYDROCHLORIDE 50 MG/5ML
1 SOLUTION ORAL
Refills: 0 | DISCHARGE

## 2024-06-26 RX ORDER — FENTANYL CITRATE 50 UG/ML
25 INJECTION, SOLUTION INTRAMUSCULAR; INTRAVENOUS
Refills: 0 | Status: DISCONTINUED | OUTPATIENT
Start: 2024-06-26 | End: 2024-06-26

## 2024-06-26 RX ORDER — TADALAFIL 10 MG/1
1 TABLET, FILM COATED ORAL
Refills: 0 | DISCHARGE

## 2024-06-26 RX ORDER — ONDANSETRON HYDROCHLORIDE 2 MG/ML
4 INJECTION INTRAMUSCULAR; INTRAVENOUS ONCE
Refills: 0 | Status: DISCONTINUED | OUTPATIENT
Start: 2024-06-26 | End: 2024-06-26

## 2024-06-26 RX ORDER — DEXTROSE MONOHYDRATE AND SODIUM CHLORIDE 5; .3 G/100ML; G/100ML
1000 INJECTION, SOLUTION INTRAVENOUS
Refills: 0 | Status: DISCONTINUED | OUTPATIENT
Start: 2024-06-26 | End: 2024-06-26

## 2024-06-26 RX ORDER — DESMOPRESSIN ACETATE 0.1 MG/1
1 TABLET ORAL
Refills: 0 | DISCHARGE

## 2024-07-24 ENCOUNTER — NON-APPOINTMENT (OUTPATIENT)
Age: 74
End: 2024-07-24

## 2024-07-26 ENCOUNTER — APPOINTMENT (OUTPATIENT)
Dept: UROLOGY | Facility: CLINIC | Age: 74
End: 2024-07-26

## 2024-07-26 VITALS
SYSTOLIC BLOOD PRESSURE: 134 MMHG | HEIGHT: 68 IN | WEIGHT: 200 LBS | BODY MASS INDEX: 30.31 KG/M2 | DIASTOLIC BLOOD PRESSURE: 78 MMHG

## 2024-07-26 DIAGNOSIS — R33.9 RETENTION OF URINE, UNSPECIFIED: ICD-10-CM

## 2024-07-26 DIAGNOSIS — L03.319 CELLULITIS OF TRUNK, UNSPECIFIED: ICD-10-CM

## 2024-07-26 DIAGNOSIS — N52.9 MALE ERECTILE DYSFUNCTION, UNSPECIFIED: ICD-10-CM

## 2024-07-26 PROCEDURE — 99214 OFFICE O/P EST MOD 30 MIN: CPT

## 2024-07-26 RX ORDER — CEPHALEXIN 500 MG/1
500 CAPSULE ORAL
Qty: 14 | Refills: 0 | Status: ACTIVE | COMMUNITY
Start: 2024-07-26 | End: 1900-01-01

## 2024-07-26 RX ORDER — TADALAFIL 20 MG/1
20 TABLET ORAL
Qty: 45 | Refills: 1 | Status: ACTIVE | COMMUNITY
Start: 2024-07-26 | End: 1900-01-01

## 2024-08-01 NOTE — ASSESSMENT
[FreeTextEntry1] : Reviewed records. Discussed labs and imaging.   Cellulitis of the suprapubic area: Will start patient on Keflex renal dose.  Recommended to monitor and if condition worsens to go to emergency department.  Erectile dysfunction: Discussed treatment options.  Gave prescription for Cialis. Recommended that patient take half a tablet first.   Discussed possible side effects.    Incomplete bladder emptying: Patient due to suprapubic catheter change in September with IR.   Patient is again requesting [place] tip catheter.   Recommended patient discuss that with Dr. Love.   Patient would like to have her colostomy reversed. Wants help scheduling appointment with colorectal. Will try and schedule appointment.

## 2024-08-01 NOTE — PHYSICAL EXAM
[Normal Appearance] : normal appearance [General Appearance - In No Acute Distress] : no acute distress [] : no respiratory distress [Abdomen Soft] : soft [Oriented To Time, Place, And Person] : oriented to person, place, and time [de-identified] : Suprapubic catheter intact, draining clear urine. About 10 cm area brooke-suprapubic catheter that was slightly erythematous. No fluctuation was noticed. It was marked out. [de-identified] : Wheelchair dependent

## 2024-08-01 NOTE — ASSESSMENT
[FreeTextEntry1] : Reviewed records. Discussed labs and imaging.   Cellulitis of the suprapubic area: Will start patient on Keflex renal dose.  Recommended to monitor and if condition worsens to go to emergency department.  Erectile dysfunction: Discussed treatment options.  Gave prescription for Cialis. Recommended that patient take half a tablet first.   Discussed possible side effects.    Incomplete bladder emptying: Patient due to suprapubic catheter change in September with IR.   Patient is again requesting [place] tip catheter.   Recommended patient discuss that with Dr. Love.   Patient would like to have her colostomy reversed. Wants help scheduling appointment with colorectal. Will try and schedule appointment.

## 2024-08-01 NOTE — HISTORY OF PRESENT ILLNESS
[FreeTextEntry1] : 07/26/2024: 74-year-old male presents for suprapubic redness.  Patient mentioned that over the last few days, he noticed redness around his suprapubic catheter.  Has been applying antibiotic ointment. Feels that it is getting better.  Mentions that ostomy sometimes leaks and has spillage over the suprapubic area.   Partner is doing the dressing changes and flushing of the catheter.   Patient is status post suprapubic catheter placement by interventional urology at [facility] on 06/26/2024.  Patient had [place] tip catheter placed in May as a suprapubic catheter by IR.  Patient subsequently went on a road trip.  Had catheter that fell off. Patient required to go to local emergency department and had indwelling [place] catheter placed.   Patient complaining of erectile dysfunction since last catheter placement.   Rates erections as 1-2/5.   Has tried Cialis in the past, got a good response.   Patient would like to have his ostomy reversed.   Status post suprapubic catheter placement by interventional radiology on 2/14/2024 at [facility]. Status post cystoscopy and attempted suprapubic catheter placement complicated by rectal injury on 2/5/2024 at [facility]. During cystoscopy no urethral stricture was seen, patient had severe bi lobar prostatic enlargement with coaptation. Status post laparoscopic diverting loop transverse colostomy by colorectal surgeon on 2/5/2024 at [facility].  Patient had longstanding history of lower urinary tract symptoms.  Was following with another urologist. Not responsive to medical therapy.  Status post UroLift few years ago. Continued to have bother.  Was mentioned concern for bladder dysfunction and urethral stricture. Was recommended suprapubic catheter.

## 2024-08-01 NOTE — HISTORY OF PRESENT ILLNESS
[FreeTextEntry1] : 07/26/2024: 74-year-old male presents for suprapubic redness.  Patient mentioned that over the last few days, he noticed redness around his suprapubic catheter.  Has been applying antibiotic ointment. Feels that it is getting better.  Mentions that ostomy sometimes leaks and has spillage over the suprapubic area.   Partner is doing the dressing changes and flushing of the catheter.   Patient is status post suprapubic catheter placement by interventional urology at [facility] on 06/26/2024.  Patient had [place] tip catheter placed in May as a suprapubic catheter by IR.  Patient subsequently went on a road trip.  Had catheter that fell off. Patient required to go to local emergency department and had indwelling [place] catheter placed.   Patient complaining of erectile dysfunction since last catheter placement.   Rates erections as 1-2/5.   Has tried Cialis in the past, got a good response.   Patient would like to have his ostomy reversed.   Status post suprapubic catheter placement by interventional radiology on 2/14/2024 at [facility]. Status post cystoscopy and attempted suprapubic catheter placement complicated by rectal injury on 2/5/2024 at [facility]. During cystoscopy no urethral stricture was seen, patient had severe bi lobar prostatic enlargement with coaptation. Status post laparoscopic diverting loop transverse colostomy by colorectal surgeon on 2/5/2024 at [facility].  Patient had longstanding history of lower urinary tract symptoms.  Was following with another urologist. Not responsive to medical therapy.  Status post UroLift few years ago. Continued to have bother.  Was mentioned concern for bladder dysfunction and urethral stricture. Was recommended suprapubic catheter.

## 2024-08-01 NOTE — ADDENDUM
[FreeTextEntry1] :  Chaparrita SOLIS assisted in documentation on 07/26/2024 acting as a scribe for Dr. Sohan Xiao.

## 2024-08-01 NOTE — END OF VISIT
[Time Spent: ___ minutes] : I have spent [unfilled] minutes of time on the encounter. [FreeTextEntry3] : Prior to appointment and during encounter with patient extensive medical records were reviewed including but not limited to, hospital records, outpatient records, imaging results, and lab data.  During this appointment the patient was examined, diagnoses were discussed and explained in a face-to-face manner.  In addition, extensive time was spent reviewing aforementioned diagnostic studies.  Counseling including imaging results, differential diagnoses, treatment options, risk and benefits, lifestyle changes, current condition, and current medications was performed.  Patient's questions and concerns were addressed.  Patient verbalized understanding of the treatment plan.  Time spent is for reviewing chart, labs and images, counseling and care coordination.

## 2024-08-01 NOTE — PHYSICAL EXAM
[Normal Appearance] : normal appearance [General Appearance - In No Acute Distress] : no acute distress [] : no respiratory distress [Abdomen Soft] : soft [Oriented To Time, Place, And Person] : oriented to person, place, and time [de-identified] : Suprapubic catheter intact, draining clear urine. About 10 cm area brooke-suprapubic catheter that was slightly erythematous. No fluctuation was noticed. It was marked out. [de-identified] : Wheelchair dependent

## 2024-09-10 ENCOUNTER — APPOINTMENT (OUTPATIENT)
Dept: COLORECTAL SURGERY | Facility: CLINIC | Age: 74
End: 2024-09-10
Payer: MEDICARE

## 2024-09-10 VITALS
HEIGHT: 68 IN | SYSTOLIC BLOOD PRESSURE: 145 MMHG | DIASTOLIC BLOOD PRESSURE: 65 MMHG | WEIGHT: 200 LBS | BODY MASS INDEX: 30.31 KG/M2 | HEART RATE: 57 BPM

## 2024-09-10 DIAGNOSIS — Z93.3 COLOSTOMY STATUS: ICD-10-CM

## 2024-09-10 PROCEDURE — 99213 OFFICE O/P EST LOW 20 MIN: CPT

## 2024-09-10 NOTE — PLAN
VIEW ALL [TextEntry] : -- I discussed with patient that from a technical standpoint it would be feasible to reverse his colostomy.  -- Prior to stoma reversal, would obtain gastrografin enema to evaluate to healing of his rectum.  If any evidence of continued leak/injury, would need to wait another few months before pursuing reversal. -- Would also plan for colonoscopy via stoma/flexible sigmoidoscopy to evaluate colon and rectum, as he has not had a recent colonoscopy.   Prep instructions given.  Would also need to perform enema x1 to clear rectum and distal sigmoid colon. -- Needs medical clearance prior to colonoscopy and likely eventual surgery - would likely aim for laparoscopic reversal. -- I discussed with patient expectations that bowel movements would be irregular immediately postop for at least 4-6 weeks.  He expressed understanding.  -- Obtain gastrografin enema and further timing of procedures can be determined from these results.

## 2024-09-10 NOTE — PHYSICAL EXAM
[JVD] : no jugular venous distention  [Respiratory Effort] : normal respiratory effort [Normal Rate and Rhythm] : normal rate and rhythm [No Edema] : No edema [No Rash or Lesion] : No rash or lesion [Alert] : alert [Oriented to Person] : oriented to person [Oriented to Place] : oriented to place [Oriented to Time] : oriented to time [Calm] : calm [de-identified] : Soft, nontender, nondistended.  Ostomy to LLQ, pink and well-perfused, gas in appliance. [de-identified] : No distress [de-identified] : Normocephalic [de-identified] : Moves all extremities

## 2024-09-10 NOTE — HISTORY OF PRESENT ILLNESS
[FreeTextEntry1] : 9/10/2024: Presents for follow-up to discuss potential stoma reversal.  He moved to [place] in March and is also does not have any further ostomy supplies at this point.  Prior to his surgery he had good control of his stool - corroborated by family.  He does have episodes after some meals where he feels urge to pass stool per rectum and he continues to have good control of this.  Last colonoscopy was several years ago, none recently, reports that last scope was negative.    3/15/2024: Presents for in-office follow up.  A day after telephone visit last week his ostomy began working again.  Not currently on any bowel regimen.  No further passage of stool from below.  Stoma with minimal output over last day.  Denies N/V.  He is currently most bothered by the smell, though he acknowledges that it has been easier for his family to care for stoma than to have to transfer him to the toilet on a daily basis.  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  His colostomy was functioning normally until 2-3 days ago when he noticed that stool output stopped.  Some pain yesterday.  He denies any nausea/vomiting and has been tolerating PO intake.  He tried Dulcolax without much effect from the ostomy, but he noted that some stool came from his rectum overnight.  Today he denies any abdominal pain or distention.  Reports that stoma is pink and healthy in appearance otherwise.  10 minutes spent with patient and his daughter Cori [phone] on the phone.

## 2024-09-16 NOTE — ED ADULT NURSE NOTE - CHIEF COMPLAINT QUOTE
[pt complaining of weakness and needing a blood transfusion.  pt states his hemaglobin is 7.8.  pt was recently admitted to  for 3 weeks.  pt is wheelchair bound.

## 2024-09-19 DIAGNOSIS — Z93.59 OTHER CYSTOSTOMY STATUS: ICD-10-CM

## 2024-09-23 ENCOUNTER — OUTPATIENT (OUTPATIENT)
Dept: OUTPATIENT SERVICES | Facility: HOSPITAL | Age: 74
LOS: 1 days | End: 2024-09-23
Payer: MEDICARE

## 2024-09-23 DIAGNOSIS — Z96.641 PRESENCE OF RIGHT ARTIFICIAL HIP JOINT: Chronic | ICD-10-CM

## 2024-09-23 DIAGNOSIS — Z93.3 COLOSTOMY STATUS: Chronic | ICD-10-CM

## 2024-09-23 DIAGNOSIS — Z93.3 COLOSTOMY STATUS: ICD-10-CM

## 2024-09-23 DIAGNOSIS — Z98.890 OTHER SPECIFIED POSTPROCEDURAL STATES: Chronic | ICD-10-CM

## 2024-09-23 DIAGNOSIS — Z93.59 OTHER CYSTOSTOMY STATUS: Chronic | ICD-10-CM

## 2024-09-23 PROCEDURE — 74270 X-RAY XM COLON 1CNTRST STD: CPT | Mod: 26

## 2024-09-23 PROCEDURE — 74270 X-RAY XM COLON 1CNTRST STD: CPT

## 2024-09-24 DIAGNOSIS — Z93.3 COLOSTOMY STATUS: ICD-10-CM

## 2024-09-25 ENCOUNTER — NON-APPOINTMENT (OUTPATIENT)
Age: 74
End: 2024-09-25

## 2024-09-27 ENCOUNTER — OUTPATIENT (OUTPATIENT)
Dept: OUTPATIENT SERVICES | Facility: HOSPITAL | Age: 74
LOS: 1 days | Discharge: HOME | End: 2024-09-27

## 2024-09-27 ENCOUNTER — APPOINTMENT (OUTPATIENT)
Dept: COLORECTAL SURGERY | Facility: HOSPITAL | Age: 74
End: 2024-09-27

## 2024-09-27 ENCOUNTER — APPOINTMENT (OUTPATIENT)
Dept: COLORECTAL SURGERY | Facility: AMBULATORY MEDICAL SERVICES | Age: 74
End: 2024-09-27

## 2024-09-27 VITALS
SYSTOLIC BLOOD PRESSURE: 164 MMHG | WEIGHT: 199.96 LBS | RESPIRATION RATE: 17 BRPM | OXYGEN SATURATION: 98 % | HEIGHT: 68 IN | HEART RATE: 77 BPM | DIASTOLIC BLOOD PRESSURE: 98 MMHG | TEMPERATURE: 98 F

## 2024-09-27 DIAGNOSIS — Z93.59 OTHER CYSTOSTOMY STATUS: Chronic | ICD-10-CM

## 2024-09-27 DIAGNOSIS — Z96.641 PRESENCE OF RIGHT ARTIFICIAL HIP JOINT: Chronic | ICD-10-CM

## 2024-09-27 DIAGNOSIS — Z98.890 OTHER SPECIFIED POSTPROCEDURAL STATES: Chronic | ICD-10-CM

## 2024-09-27 DIAGNOSIS — Z93.3 COLOSTOMY STATUS: ICD-10-CM

## 2024-09-27 DIAGNOSIS — Z93.3 COLOSTOMY STATUS: Chronic | ICD-10-CM

## 2024-09-27 PROCEDURE — 44388 COLONOSCOPY THRU STOMA SPX: CPT

## 2024-09-27 PROCEDURE — 45330 DIAGNOSTIC SIGMOIDOSCOPY: CPT

## 2024-09-27 RX ORDER — POLYETHYLENE GLYCOL 3350 AND ELECTROLYTES WITH LEMON FLAVOR 236; 22.74; 6.74; 5.86; 2.97 G/4L; G/4L; G/4L; G/4L; G/4L
236 POWDER, FOR SOLUTION ORAL
Qty: 1 | Refills: 0 | Status: ACTIVE | COMMUNITY
Start: 2024-09-27 | End: 1900-01-01

## 2024-09-27 NOTE — ASU PATIENT PROFILE, ADULT - FALL HARM RISK - HARM RISK INTERVENTIONS

## 2024-09-27 NOTE — ASU PATIENT PROFILE, ADULT - BRADEN SCORE (IF 18 OR LESS ACTIVATE SKIN INJURY RISK INCREASED GUIDELINE), MLM
Prior auth request received submitted to cover my meds and approved.   Service(s) requested: Pantoprazole Sodium 40MG OR TBEC  Date(s) of service: 03/28/2023 - 03/28/2024   Key: [id number] - PA Case ID: [id number] - Rx #: [id number]    Pharmacy notified
14

## 2024-09-27 NOTE — DISCHARGE NOTE PROVIDER - NSDCHHASSISTDEVIC_GEN_ALL_CORE
Chief complaint: Abdominal pain    Postoperative day 1: Exploratory laparotomy with end colostomy creation due to recurrent internal hernia through mesocolic defect    Patient doing well this morning.  Serous output from stoma, no flatus.  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  Due to recurrent nature, and inability to orient the bowel in a way that would avoid recurrent internal hernia the distal colon was transected near the rectum and an end colostomy was performed to eliminate the internal hernia space.  -Awaiting return of bowel function  -Clear liquid diet  -Okay to resume anticoagulation   Cane/Walker

## 2024-09-27 NOTE — ASU PATIENT PROFILE, ADULT - NSICDXPASTMEDICALHX_GEN_ALL_CORE_FT
PAST MEDICAL HISTORY:  H/O urinary retention mcconnell inplace    Hypertension     [place]      PAST MEDICAL HISTORY:  H/O urinary retention suprapubic tube    HLD (hyperlipidemia)     Hypertension     [place]

## 2024-09-27 NOTE — ASU PATIENT PROFILE, ADULT - NSICDXPASTSURGICALHX_GEN_ALL_CORE_FT
PAST SURGICAL HISTORY:  H/O total hip arthroplasty, right Bilat    S/P colostomy 2/2024    S/P ORIF (open reduction internal fixation) fracture b/l legs    Suprapubic catheter

## 2024-09-27 NOTE — ASU PREOP CHECKLIST - HAND OFF
Unit RN to OR RN Cellcept Counseling:  I discussed with the patient the risks of mycophenolate mofetil including but not limited to infection/immunosuppression, GI upset, hypokalemia, hypercholesterolemia, bone marrow suppression, lymphoproliferative disorders, malignancy, GI ulceration/bleed/perforation, colitis, interstitial lung disease, kidney failure, progressive multifocal leukoencephalopathy, and birth defects.  The patient understands that monitoring is required including a baseline creatinine and regular CBC testing. In addition, patient must alert us immediately if symptoms of infection or other concerning signs are noted.

## 2024-09-27 NOTE — ASU PATIENT PROFILE, ADULT - FALL HARM RISK - RISK INTERVENTIONS

## 2024-09-30 ENCOUNTER — TRANSCRIPTION ENCOUNTER (OUTPATIENT)
Age: 74
End: 2024-09-30

## 2024-09-30 ENCOUNTER — OUTPATIENT (OUTPATIENT)
Dept: INPATIENT UNIT | Facility: HOSPITAL | Age: 74
LOS: 1 days | Discharge: HOME | End: 2024-09-30
Payer: MEDICARE

## 2024-09-30 VITALS
OXYGEN SATURATION: 99 % | TEMPERATURE: 99 F | RESPIRATION RATE: 16 BRPM | HEIGHT: 68 IN | HEART RATE: 50 BPM | WEIGHT: 199.96 LBS | DIASTOLIC BLOOD PRESSURE: 65 MMHG | SYSTOLIC BLOOD PRESSURE: 178 MMHG

## 2024-09-30 VITALS
TEMPERATURE: 98 F | DIASTOLIC BLOOD PRESSURE: 68 MMHG | RESPIRATION RATE: 18 BRPM | OXYGEN SATURATION: 100 % | HEART RATE: 89 BPM | SYSTOLIC BLOOD PRESSURE: 158 MMHG

## 2024-09-30 DIAGNOSIS — Z98.890 OTHER SPECIFIED POSTPROCEDURAL STATES: Chronic | ICD-10-CM

## 2024-09-30 DIAGNOSIS — Z96.641 PRESENCE OF RIGHT ARTIFICIAL HIP JOINT: Chronic | ICD-10-CM

## 2024-09-30 DIAGNOSIS — Z93.59 OTHER CYSTOSTOMY STATUS: Chronic | ICD-10-CM

## 2024-09-30 DIAGNOSIS — R33.9 RETENTION OF URINE, UNSPECIFIED: ICD-10-CM

## 2024-09-30 PROBLEM — Z87.898 PERSONAL HISTORY OF OTHER SPECIFIED CONDITIONS: Chronic | Status: ACTIVE | Noted: 2024-06-25

## 2024-09-30 LAB
ANION GAP SERPL CALC-SCNC: 12 MMOL/L — SIGNIFICANT CHANGE UP (ref 5–17)
BUN SERPL-MCNC: 89 MG/DL — HIGH (ref 7–23)
CALCIUM SERPL-MCNC: 8 MG/DL — LOW (ref 8.5–10.1)
CHLORIDE SERPL-SCNC: 108 MMOL/L — SIGNIFICANT CHANGE UP (ref 96–108)
CO2 SERPL-SCNC: 18 MMOL/L — LOW (ref 22–31)
CREAT SERPL-MCNC: 4.1 MG/DL — HIGH (ref 0.5–1.3)
EGFR: 15 ML/MIN/1.73M2 — LOW
GLUCOSE SERPL-MCNC: 147 MG/DL — HIGH (ref 70–99)
HCT VFR BLD CALC: 34.6 % — LOW (ref 39–50)
HGB BLD-MCNC: 10.9 G/DL — LOW (ref 13–17)
INR BLD: 1.05 RATIO — SIGNIFICANT CHANGE UP (ref 0.85–1.16)
MCHC RBC-ENTMCNC: 29.1 PG — SIGNIFICANT CHANGE UP (ref 27–34)
MCHC RBC-ENTMCNC: 31.5 GM/DL — LOW (ref 32–36)
MCV RBC AUTO: 92.3 FL — SIGNIFICANT CHANGE UP (ref 80–100)
PLATELET # BLD AUTO: 194 K/UL — SIGNIFICANT CHANGE UP (ref 150–400)
POTASSIUM SERPL-MCNC: 3.2 MMOL/L — LOW (ref 3.5–5.3)
POTASSIUM SERPL-SCNC: 3.2 MMOL/L — LOW (ref 3.5–5.3)
PROTHROM AB SERPL-ACNC: 12.1 SEC — SIGNIFICANT CHANGE UP (ref 9.9–13.4)
RBC # BLD: 3.75 M/UL — LOW (ref 4.2–5.8)
RBC # FLD: 14.6 % — HIGH (ref 10.3–14.5)
SODIUM SERPL-SCNC: 138 MMOL/L — SIGNIFICANT CHANGE UP (ref 135–145)
WBC # BLD: 14.03 K/UL — HIGH (ref 3.8–10.5)
WBC # FLD AUTO: 14.03 K/UL — HIGH (ref 3.8–10.5)

## 2024-09-30 PROCEDURE — 75984 XRAY CONTROL CATHETER CHANGE: CPT | Mod: 26

## 2024-09-30 PROCEDURE — 93010 ELECTROCARDIOGRAM REPORT: CPT

## 2024-09-30 PROCEDURE — C1769: CPT

## 2024-09-30 PROCEDURE — 87186 SC STD MICRODIL/AGAR DIL: CPT

## 2024-09-30 PROCEDURE — 87077 CULTURE AEROBIC IDENTIFY: CPT

## 2024-09-30 PROCEDURE — 93005 ELECTROCARDIOGRAM TRACING: CPT | Mod: XU

## 2024-09-30 PROCEDURE — 75984 XRAY CONTROL CATHETER CHANGE: CPT

## 2024-09-30 PROCEDURE — 87086 URINE CULTURE/COLONY COUNT: CPT

## 2024-09-30 PROCEDURE — 85027 COMPLETE CBC AUTOMATED: CPT

## 2024-09-30 PROCEDURE — 80048 BASIC METABOLIC PNL TOTAL CA: CPT

## 2024-09-30 PROCEDURE — C1729: CPT

## 2024-09-30 PROCEDURE — 36415 COLL VENOUS BLD VENIPUNCTURE: CPT

## 2024-09-30 PROCEDURE — 85610 PROTHROMBIN TIME: CPT

## 2024-09-30 PROCEDURE — 51705 CHANGE OF BLADDER TUBE: CPT

## 2024-09-30 RX ORDER — DICLOFENAC SODIUM 20 MG/G
2 SOLUTION TOPICAL
Refills: 0 | DISCHARGE

## 2024-09-30 RX ORDER — CARVEDILOL 6.25 MG/1
1 TABLET ORAL
Refills: 0 | DISCHARGE

## 2024-09-30 RX ORDER — BROMFENAC SODIUM 0.9 MG/ML
1 SOLUTION/ DROPS OPHTHALMIC
Refills: 0 | DISCHARGE

## 2024-09-30 RX ORDER — AMLODIPINE BESYLATE AND ATORVASTATIN CALCIUM 5; 40 MG/1; MG/1
1 TABLET, FILM COATED ORAL
Refills: 0 | DISCHARGE

## 2024-09-30 RX ORDER — SODIUM CHLORIDE IRRIG SOLUTION 0.9 %
1000 SOLUTION, IRRIGATION IRRIGATION
Refills: 0 | Status: DISCONTINUED | OUTPATIENT
Start: 2024-09-30 | End: 2024-09-30

## 2024-09-30 RX ORDER — FUROSEMIDE 40 MG
1 TABLET ORAL
Refills: 0 | DISCHARGE

## 2024-09-30 RX ORDER — METHYLPREDNISOLONE 4 MG
0 TABLET ORAL
Refills: 0 | DISCHARGE

## 2024-09-30 RX ORDER — SEVELAMER CARBONATE 800 MG/1
1 TABLET, FILM COATED ORAL
Refills: 0 | DISCHARGE

## 2024-09-30 RX ORDER — FENTANYL CITRATE-0.9 % NACL/PF 300MCG/30
50 PATIENT CONTROLLED ANALGESIA VIAL INJECTION
Refills: 0 | Status: DISCONTINUED | OUTPATIENT
Start: 2024-09-30 | End: 2024-09-30

## 2024-09-30 RX ORDER — SODIUM BICARBONATE 84 MG/ML
0 INJECTION, SOLUTION INTRAVENOUS
Refills: 0 | DISCHARGE

## 2024-09-30 RX ORDER — FENTANYL CITRATE-0.9 % NACL/PF 300MCG/30
25 PATIENT CONTROLLED ANALGESIA VIAL INJECTION
Refills: 0 | Status: DISCONTINUED | OUTPATIENT
Start: 2024-09-30 | End: 2024-09-30

## 2024-09-30 RX ORDER — GABAPENTIN 100 MG
1 CAPSULE ORAL
Refills: 0 | DISCHARGE

## 2024-09-30 RX ORDER — ONDANSETRON HCL/PF 4 MG/2 ML
4 VIAL (ML) INJECTION ONCE
Refills: 0 | Status: DISCONTINUED | OUTPATIENT
Start: 2024-09-30 | End: 2024-09-30

## 2024-09-30 NOTE — ASU DISCHARGE PLAN (ADULT/PEDIATRIC) - NS MD DC FALL RISK RISK
For information on Fall & Injury Prevention, visit: [email or web address] OR  [email or web address] OR  https://www.cdc.gov/steadi/patient.html

## 2024-10-01 DIAGNOSIS — Z87.891 PERSONAL HISTORY OF NICOTINE DEPENDENCE: ICD-10-CM

## 2024-10-01 DIAGNOSIS — I12.9 HYPERTENSIVE CHRONIC KIDNEY DISEASE WITH STAGE 1 THROUGH STAGE 4 CHRONIC KIDNEY DISEASE, OR UNSPECIFIED CHRONIC KIDNEY DISEASE: ICD-10-CM

## 2024-10-01 DIAGNOSIS — Z53.8 PROCEDURE AND TREATMENT NOT CARRIED OUT FOR OTHER REASONS: ICD-10-CM

## 2024-10-01 DIAGNOSIS — Z79.52 LONG TERM (CURRENT) USE OF SYSTEMIC STEROIDS: ICD-10-CM

## 2024-10-01 DIAGNOSIS — Z79.01 LONG TERM (CURRENT) USE OF ANTICOAGULANTS: ICD-10-CM

## 2024-10-01 DIAGNOSIS — Z12.11 ENCOUNTER FOR SCREENING FOR MALIGNANT NEOPLASM OF COLON: ICD-10-CM

## 2024-10-01 DIAGNOSIS — E78.2 MIXED HYPERLIPIDEMIA: ICD-10-CM

## 2024-10-01 DIAGNOSIS — N18.4 CHRONIC KIDNEY DISEASE, STAGE 4 (SEVERE): ICD-10-CM

## 2024-10-02 LAB
-  AMOXICILLIN/CLAVULANIC ACID: SIGNIFICANT CHANGE UP
-  AMPICILLIN/SULBACTAM: SIGNIFICANT CHANGE UP
-  AMPICILLIN: SIGNIFICANT CHANGE UP
-  AZTREONAM: SIGNIFICANT CHANGE UP
-  CEFAZOLIN: SIGNIFICANT CHANGE UP
-  CEFEPIME: SIGNIFICANT CHANGE UP
-  CEFOXITIN: SIGNIFICANT CHANGE UP
-  CEFTRIAXONE: SIGNIFICANT CHANGE UP
-  CIPROFLOXACIN: SIGNIFICANT CHANGE UP
-  ERTAPENEM: SIGNIFICANT CHANGE UP
-  GENTAMICIN: SIGNIFICANT CHANGE UP
-  IMIPENEM: SIGNIFICANT CHANGE UP
-  LEVOFLOXACIN: SIGNIFICANT CHANGE UP
-  MEROPENEM: SIGNIFICANT CHANGE UP
-  NITROFURANTOIN: SIGNIFICANT CHANGE UP
-  PIPERACILLIN/TAZOBACTAM: SIGNIFICANT CHANGE UP
-  TOBRAMYCIN: SIGNIFICANT CHANGE UP
-  TRIMETHOPRIM/SULFAMETHOXAZOLE: SIGNIFICANT CHANGE UP
METHOD TYPE: SIGNIFICANT CHANGE UP

## 2024-10-03 PROBLEM — E78.5 HYPERLIPIDEMIA, UNSPECIFIED: Chronic | Status: ACTIVE | Noted: 2024-09-27

## 2024-10-03 LAB
-  AMIKACIN: SIGNIFICANT CHANGE UP
-  AMOXICILLIN/CLAVULANIC ACID: SIGNIFICANT CHANGE UP
-  AMOXICILLIN/CLAVULANIC ACID: SIGNIFICANT CHANGE UP
-  AMPICILLIN/SULBACTAM: SIGNIFICANT CHANGE UP
-  AMPICILLIN/SULBACTAM: SIGNIFICANT CHANGE UP
-  AMPICILLIN: SIGNIFICANT CHANGE UP
-  AMPICILLIN: SIGNIFICANT CHANGE UP
-  AZTREONAM: SIGNIFICANT CHANGE UP
-  CEFAZOLIN: SIGNIFICANT CHANGE UP
-  CEFAZOLIN: SIGNIFICANT CHANGE UP
-  CEFEPIME: SIGNIFICANT CHANGE UP
-  CEFOXITIN: SIGNIFICANT CHANGE UP
-  CEFOXITIN: SIGNIFICANT CHANGE UP
-  CEFTAZIDIME: SIGNIFICANT CHANGE UP
-  CEFTRIAXONE: SIGNIFICANT CHANGE UP
-  CEFTRIAXONE: SIGNIFICANT CHANGE UP
-  CEFUROXIME: SIGNIFICANT CHANGE UP
-  CIPROFLOXACIN: SIGNIFICANT CHANGE UP
-  ERTAPENEM: SIGNIFICANT CHANGE UP
-  ERTAPENEM: SIGNIFICANT CHANGE UP
-  GENTAMICIN: SIGNIFICANT CHANGE UP
-  GENTAMICIN: SIGNIFICANT CHANGE UP
-  IMIPENEM: SIGNIFICANT CHANGE UP
-  LEVOFLOXACIN: SIGNIFICANT CHANGE UP
-  MEROPENEM: SIGNIFICANT CHANGE UP
-  NITROFURANTOIN: SIGNIFICANT CHANGE UP
-  NITROFURANTOIN: SIGNIFICANT CHANGE UP
-  PIPERACILLIN/TAZOBACTAM: SIGNIFICANT CHANGE UP
-  TOBRAMYCIN: SIGNIFICANT CHANGE UP
-  TOBRAMYCIN: SIGNIFICANT CHANGE UP
-  TRIMETHOPRIM/SULFAMETHOXAZOLE: SIGNIFICANT CHANGE UP
-  TRIMETHOPRIM/SULFAMETHOXAZOLE: SIGNIFICANT CHANGE UP
CULTURE RESULTS: ABNORMAL
METHOD TYPE: SIGNIFICANT CHANGE UP
ORGANISM # SPEC MICROSCOPIC CNT: ABNORMAL
ORGANISM # SPEC MICROSCOPIC CNT: SIGNIFICANT CHANGE UP
SPECIMEN SOURCE: SIGNIFICANT CHANGE UP

## 2024-10-07 ENCOUNTER — APPOINTMENT (OUTPATIENT)
Dept: COLORECTAL SURGERY | Facility: HOSPITAL | Age: 74
End: 2024-10-07

## 2024-10-07 ENCOUNTER — RESULT REVIEW (OUTPATIENT)
Age: 74
End: 2024-10-07

## 2024-10-07 ENCOUNTER — OUTPATIENT (OUTPATIENT)
Dept: OUTPATIENT SERVICES | Facility: HOSPITAL | Age: 74
LOS: 1 days | Discharge: HOME | End: 2024-10-07
Payer: MEDICARE

## 2024-10-07 VITALS
HEART RATE: 73 BPM | TEMPERATURE: 98 F | HEIGHT: 68 IN | WEIGHT: 199.96 LBS | OXYGEN SATURATION: 100 % | RESPIRATION RATE: 19 BRPM

## 2024-10-07 DIAGNOSIS — Z96.641 PRESENCE OF RIGHT ARTIFICIAL HIP JOINT: Chronic | ICD-10-CM

## 2024-10-07 DIAGNOSIS — Z93.59 OTHER CYSTOSTOMY STATUS: Chronic | ICD-10-CM

## 2024-10-07 DIAGNOSIS — Z98.890 OTHER SPECIFIED POSTPROCEDURAL STATES: Chronic | ICD-10-CM

## 2024-10-07 DIAGNOSIS — Z93.3 COLOSTOMY STATUS: ICD-10-CM

## 2024-10-07 DIAGNOSIS — Z93.3 COLOSTOMY STATUS: Chronic | ICD-10-CM

## 2024-10-07 PROCEDURE — 45331 SIGMOIDOSCOPY AND BIOPSY: CPT

## 2024-10-07 PROCEDURE — 88305 TISSUE EXAM BY PATHOLOGIST: CPT | Mod: 26

## 2024-10-07 PROCEDURE — 44388 COLONOSCOPY THRU STOMA SPX: CPT

## 2024-10-07 PROCEDURE — 88305 TISSUE EXAM BY PATHOLOGIST: CPT

## 2024-10-07 NOTE — ASU PATIENT PROFILE, ADULT - FALL HARM RISK - HARM RISK INTERVENTIONS

## 2024-10-07 NOTE — ASU PREOP CHECKLIST - BOWEL PREP
Please notify patient he needs to come in to repeat his Vitamin d level before any refills as he may not need the high dose anymore. done

## 2024-10-07 NOTE — ASU PATIENT PROFILE, ADULT - NSICDXPASTMEDICALHX_GEN_ALL_CORE_FT
PAST MEDICAL HISTORY:  H/O urinary retention suprapubic tube    HLD (hyperlipidemia)     Hypertension     [place]

## 2024-10-08 ENCOUNTER — NON-APPOINTMENT (OUTPATIENT)
Age: 74
End: 2024-10-08

## 2024-10-08 LAB — SURGICAL PATHOLOGY STUDY: SIGNIFICANT CHANGE UP

## 2024-10-08 RX ORDER — POLYETHYLENE GLYCOL 3350 AND ELECTROLYTES WITH LEMON FLAVOR 236; 22.74; 6.74; 5.86; 2.97 G/4L; G/4L; G/4L; G/4L; G/4L
236 POWDER, FOR SOLUTION ORAL
Qty: 1 | Refills: 0 | Status: ACTIVE | COMMUNITY
Start: 2024-10-08 | End: 1900-01-01

## 2024-10-09 ENCOUNTER — OUTPATIENT (OUTPATIENT)
Dept: OUTPATIENT SERVICES | Facility: HOSPITAL | Age: 74
LOS: 1 days | End: 2024-10-09
Payer: MEDICARE

## 2024-10-09 VITALS
WEIGHT: 315 LBS | SYSTOLIC BLOOD PRESSURE: 130 MMHG | HEART RATE: 69 BPM | HEIGHT: 68 IN | TEMPERATURE: 97 F | DIASTOLIC BLOOD PRESSURE: 65 MMHG | OXYGEN SATURATION: 94 %

## 2024-10-09 DIAGNOSIS — Z98.49 CATARACT EXTRACTION STATUS, UNSPECIFIED EYE: Chronic | ICD-10-CM

## 2024-10-09 DIAGNOSIS — Z96.641 PRESENCE OF RIGHT ARTIFICIAL HIP JOINT: Chronic | ICD-10-CM

## 2024-10-09 DIAGNOSIS — Z93.59 OTHER CYSTOSTOMY STATUS: ICD-10-CM

## 2024-10-09 DIAGNOSIS — Z98.890 OTHER SPECIFIED POSTPROCEDURAL STATES: Chronic | ICD-10-CM

## 2024-10-09 DIAGNOSIS — K62.1 RECTAL POLYP: ICD-10-CM

## 2024-10-09 DIAGNOSIS — E66.01 MORBID (SEVERE) OBESITY DUE TO EXCESS CALORIES: ICD-10-CM

## 2024-10-09 DIAGNOSIS — Z93.3 COLOSTOMY STATUS: Chronic | ICD-10-CM

## 2024-10-09 DIAGNOSIS — N40.1 BENIGN PROSTATIC HYPERPLASIA WITH LOWER URINARY TRACT SYMPTOMS: ICD-10-CM

## 2024-10-09 DIAGNOSIS — Z29.9 ENCOUNTER FOR PROPHYLACTIC MEASURES, UNSPECIFIED: ICD-10-CM

## 2024-10-09 DIAGNOSIS — Z12.11 ENCOUNTER FOR SCREENING FOR MALIGNANT NEOPLASM OF COLON: ICD-10-CM

## 2024-10-09 DIAGNOSIS — Z93.3 COLOSTOMY STATUS: ICD-10-CM

## 2024-10-09 DIAGNOSIS — E11.9 TYPE 2 DIABETES MELLITUS WITHOUT COMPLICATIONS: ICD-10-CM

## 2024-10-09 DIAGNOSIS — Z91.89 OTHER SPECIFIED PERSONAL RISK FACTORS, NOT ELSEWHERE CLASSIFIED: ICD-10-CM

## 2024-10-09 DIAGNOSIS — I10 ESSENTIAL (PRIMARY) HYPERTENSION: ICD-10-CM

## 2024-10-09 DIAGNOSIS — Z86.718 PERSONAL HISTORY OF OTHER VENOUS THROMBOSIS AND EMBOLISM: ICD-10-CM

## 2024-10-09 DIAGNOSIS — Z01.818 ENCOUNTER FOR OTHER PREPROCEDURAL EXAMINATION: ICD-10-CM

## 2024-10-09 DIAGNOSIS — Z79.01 LONG TERM (CURRENT) USE OF ANTICOAGULANTS: ICD-10-CM

## 2024-10-09 DIAGNOSIS — Z93.59 OTHER CYSTOSTOMY STATUS: Chronic | ICD-10-CM

## 2024-10-09 LAB
A1C WITH ESTIMATED AVERAGE GLUCOSE RESULT: 4.9 % — SIGNIFICANT CHANGE UP (ref 4–5.6)
ALBUMIN SERPL ELPH-MCNC: 3.1 G/DL — LOW (ref 3.3–5.2)
ALP SERPL-CCNC: 84 U/L — SIGNIFICANT CHANGE UP (ref 40–120)
ALT FLD-CCNC: <5 U/L — SIGNIFICANT CHANGE UP
ANION GAP SERPL CALC-SCNC: 17 MMOL/L — SIGNIFICANT CHANGE UP (ref 5–17)
APTT BLD: 32.3 SEC — SIGNIFICANT CHANGE UP (ref 24.5–35.6)
AST SERPL-CCNC: 8 U/L — SIGNIFICANT CHANGE UP
BASOPHILS # BLD AUTO: 0.03 K/UL — SIGNIFICANT CHANGE UP (ref 0–0.2)
BASOPHILS NFR BLD AUTO: 0.4 % — SIGNIFICANT CHANGE UP (ref 0–2)
BILIRUB SERPL-MCNC: <0.2 MG/DL — LOW (ref 0.4–2)
BLD GP AB SCN SERPL QL: SIGNIFICANT CHANGE UP
BUN SERPL-MCNC: 58.3 MG/DL — HIGH (ref 8–20)
CALCIUM SERPL-MCNC: 7.2 MG/DL — LOW (ref 8.4–10.5)
CHLORIDE SERPL-SCNC: 106 MMOL/L — SIGNIFICANT CHANGE UP (ref 96–108)
CO2 SERPL-SCNC: 20 MMOL/L — LOW (ref 22–29)
CREAT SERPL-MCNC: 4.19 MG/DL — HIGH (ref 0.5–1.3)
EGFR: 14 ML/MIN/1.73M2 — LOW
EOSINOPHIL # BLD AUTO: 0.46 K/UL — SIGNIFICANT CHANGE UP (ref 0–0.5)
EOSINOPHIL NFR BLD AUTO: 6 % — SIGNIFICANT CHANGE UP (ref 0–6)
ESTIMATED AVERAGE GLUCOSE: 94 MG/DL — SIGNIFICANT CHANGE UP (ref 68–114)
GLUCOSE SERPL-MCNC: 135 MG/DL — HIGH (ref 70–99)
HCT VFR BLD CALC: 30.3 % — LOW (ref 39–50)
HGB BLD-MCNC: 9.5 G/DL — LOW (ref 13–17)
IMM GRANULOCYTES NFR BLD AUTO: 0.8 % — SIGNIFICANT CHANGE UP (ref 0–0.9)
INR BLD: 1.15 RATIO — SIGNIFICANT CHANGE UP (ref 0.85–1.16)
LYMPHOCYTES # BLD AUTO: 1.05 K/UL — SIGNIFICANT CHANGE UP (ref 1–3.3)
LYMPHOCYTES # BLD AUTO: 13.6 % — SIGNIFICANT CHANGE UP (ref 13–44)
MCHC RBC-ENTMCNC: 28.6 PG — SIGNIFICANT CHANGE UP (ref 27–34)
MCHC RBC-ENTMCNC: 31.4 GM/DL — LOW (ref 32–36)
MCV RBC AUTO: 91.3 FL — SIGNIFICANT CHANGE UP (ref 80–100)
MONOCYTES # BLD AUTO: 0.54 K/UL — SIGNIFICANT CHANGE UP (ref 0–0.9)
MONOCYTES NFR BLD AUTO: 7 % — SIGNIFICANT CHANGE UP (ref 2–14)
NEUTROPHILS # BLD AUTO: 5.56 K/UL — SIGNIFICANT CHANGE UP (ref 1.8–7.4)
NEUTROPHILS NFR BLD AUTO: 72.2 % — SIGNIFICANT CHANGE UP (ref 43–77)
PLATELET # BLD AUTO: 240 K/UL — SIGNIFICANT CHANGE UP (ref 150–400)
POTASSIUM SERPL-MCNC: 3.8 MMOL/L — SIGNIFICANT CHANGE UP (ref 3.5–5.3)
POTASSIUM SERPL-SCNC: 3.8 MMOL/L — SIGNIFICANT CHANGE UP (ref 3.5–5.3)
PROT SERPL-MCNC: 6.3 G/DL — LOW (ref 6.6–8.7)
PROTHROM AB SERPL-ACNC: 13.3 SEC — SIGNIFICANT CHANGE UP (ref 9.9–13.4)
RBC # BLD: 3.32 M/UL — LOW (ref 4.2–5.8)
RBC # FLD: 14.6 % — HIGH (ref 10.3–14.5)
SODIUM SERPL-SCNC: 143 MMOL/L — SIGNIFICANT CHANGE UP (ref 135–145)
WBC # BLD: 7.7 K/UL — SIGNIFICANT CHANGE UP (ref 3.8–10.5)
WBC # FLD AUTO: 7.7 K/UL — SIGNIFICANT CHANGE UP (ref 3.8–10.5)

## 2024-10-09 PROCEDURE — 93010 ELECTROCARDIOGRAM REPORT: CPT

## 2024-10-09 RX ORDER — NEOMYCIN SULFATE 500 MG/1
500 TABLET ORAL
Qty: 6 | Refills: 0 | Status: ACTIVE | COMMUNITY
Start: 2024-10-09 | End: 1900-01-01

## 2024-10-09 RX ORDER — METRONIDAZOLE 500 MG/1
500 TABLET ORAL
Qty: 3 | Refills: 0 | Status: ACTIVE | COMMUNITY
Start: 2024-10-09 | End: 1900-01-01

## 2024-10-09 RX ORDER — APIXABAN 5 MG/1
1 TABLET, FILM COATED ORAL
Refills: 0 | DISCHARGE

## 2024-10-09 RX ORDER — TADALAFIL 20 MG/1
1 TABLET, FILM COATED ORAL
Refills: 0 | DISCHARGE

## 2024-10-09 NOTE — H&P PST ADULT - PROBLEM SELECTOR PLAN 1
scheduled 10/24/2024 tatiana. possible Lap poss. open colostomy reversal via stoma with Dr. Nicole  Patient educated on clearances, surgical scrub, labs/diagnostics performed, preadmission instructions,  and day of procedure medications- verbalized understanding, teach back method utilized.   Stop vitamins/ supplements/ NSAIDs 5 days prior to procedure.   advised on medication use as per periop protocol (see written forms)  pending medical clearance scheduled 10/24/2024 tatiana. possible Lap poss. open colostomy reversal via stoma with Dr. Nicole  Patient educated on clearances, surgical scrub, labs/diagnostics performed, preadmission instructions,  and day of procedure medications- verbalized understanding, teach back method utilized.   Stop vitamins/ supplements/ NSAIDs 5 days prior to procedure.   advised on medication use as per periop protocol (see written forms)  pending medical clearance.

## 2024-10-09 NOTE — H&P PST ADULT - EKG AND INTERPRETATION
SR VR66 with 1st degree AV block, left ant. fascicular block, nonspecific T wave abnormality, prolonged QT  pending final read pending medical clearance

## 2024-10-09 NOTE — H&P PST ADULT - HISTORY OF PRESENT ILLNESS
73yo M patient of Dr. Nicole, Pmhx DMT2/ sleep apnea/ ED, BPH with LUTS, hx bladder outlet obstruction, s/p IR CT guided 16F suprapubic tube insertion with CT guidance (DOS07/02/2024) urology Dr. Xiao, previous placement by IR on 2/14/2024 at [facility], hx cystoscopy and attempted suprapubic catheter placement complicated by rectal injury DOS2/5/24 at [facility], now with subsequent colostomy he is s/p lap. diverting loop transverse colostomy DOS2/5/2024 at [facility], presents for PST 10/09/2024 for planned colostomy reversal via Stoma. patient reports hx...  as per Dr. Nicole- Colostomy in place, recommended Colonoscopy via colostomy; flexible sigmoidoscopy which was completed 10/07/2024, discussed with patient that from a technical standpoint it would be feasible to reverse his colostomy, Needs medical clearance, aim for laparoscopic reversal, discussed with patient expectations, He expressed understanding. he is scheduled 10/24/2024 tatiana. possible Lap poss. open colostomy reversal via stoma with Dr. Nicole. patient o/w reports feeling well with no other acute concerns.    73yo M patient of Dr. Nicole, Pmhx Htn not on medication/ polio Dx at 19mths, now wheelchair bound/ hx DVT RLE 05/2024 2/2 surgical complication, he is s/p Tx with Eliquis, no longer on anticoagulant or antiplatelet therapy/ ED, BPH with LUTS, hx bladder outlet obstruction, s/p IR CT guided suprapubic tube insertion with CT guidance (DOS10/04/2024) urology Dr. Xiao, previous placement by IR on 2/14/2024 at [facility], hx cystoscopy and attempted suprapubic catheter placement complicated by rectal injury DOS2/5/24 at [facility], now with subsequent colostomy he is s/p lap. diverting loop transverse colostomy DOS2/5/2024 at [facility], presents for PST 10/09/2024 for planned colostomy reversal via Stoma. as per Dr. Nicole- Colostomy in place, recommended Colonoscopy via colostomy; flexible sigmoidoscopy which was completed 10/07/2024, discussed with patient that from a technical standpoint it would be feasible to reverse his colostomy, Needs medical clearance, aim for laparoscopic reversal, discussed with patient expectations, He expressed understanding. he is scheduled 10/24/2024 tatiana. possible Lap poss. open colostomy reversal via stoma with Dr. Nicole. patient o/w reports feeling well with no other acute concerns.    73yo M patient of Dr. Nicole, Pmhx anemia of chronic disease, CKD stage 4, high risk for progression to ESRD, sees nephrology Dr. Soares/ Htn not on medication/ polio Dx at 18mths, now wheelchair bound/ hx DVT RLE 05/2024 2/2 surgical complication, he is s/p Tx with Eliquis, no longer on anticoagulant or antiplatelet therapy/ ED, BPH with LUTS, hx bladder outlet obstruction, s/p IR CT guided suprapubic tube insertion with CT guidance (DOS10/04/2024) urology Dr. Xiao, previous placement by IR on 2/14/2024 at [facility], hx cystoscopy and attempted suprapubic catheter placement complicated by rectal injury DOS2/5/24 at [facility], now with subsequent colostomy he is s/p lap. diverting loop transverse colostomy DOS2/5/2024 at [facility], presents for PST 10/09/2024 for planned colostomy reversal via Stoma. as per Dr. Nicole- Colostomy in place, recommended Colonoscopy via colostomy; flexible sigmoidoscopy which was completed 10/07/2024, discussed with patient that from a technical standpoint it would be feasible to reverse his colostomy, Needs medical clearance, aim for laparoscopic reversal, discussed with patient expectations, He expressed understanding. he is scheduled 10/24/2024 tatiana. possible Lap poss. open colostomy reversal via stoma with Dr. Nicole. patient o/w reports feeling well with no other acute concerns.

## 2024-10-09 NOTE — H&P PST ADULT - NEGATIVE CARDIOVASCULAR SYMPTOMS
no chest pain/no palpitations/no dyspnea on exertion/no orthopnea/no paroxysmal nocturnal dyspnea/no peripheral edema/no claudication no chest pain/no palpitations/no dyspnea on exertion/no orthopnea/no paroxysmal nocturnal dyspnea

## 2024-10-09 NOTE — H&P PST ADULT - COMMENTS
neck 19in. BMI not confirmed- weight is with wheel chair in kg, patient states 200lb, unable to weigh 2/2 patient unable to transfer/no access to lift.
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
## 2024-10-09 NOTE — H&P PST ADULT - HEART RATE (BEATS/MIN)
NURSE ANTICOAGULATION PHONE CONTACT    Cherri Gayle 11/21/1926 contacted by phone today for 1 week INR results  No outpatient prescriptions have been marked as taking for the 7/27/18 encounter (Telephone) with Alok ELIZABETH MD.       INR (no units)   Date Value   07/27/2018 1.8     GOAL RANGE: 2.0-3.0    ALLERGIES:  No Known Allergies    Patient Active Problem List   Diagnosis   • Long term (current) use of anticoagulants   • Encounter for therapeutic drug monitoring   • Atrial fibrillation (CMS/HCC)   • Unspecified cerebral artery occlusion with cerebral infarction   • Allergic sinusitis   • Nonexudative age-related macular degeneration both eye   • Coronary artery disease with angina pectoris (CMS/HCC)   • Aortic valve regurgitation   • History of CVA (cerebrovascular accident)   • Seizure disorder (CMS/HCC)   • Polymyalgia rheumatica (CMS/HCC)   • Bradycardia   • CKD (chronic kidney disease), stage III   • H/O amiodarone therapy   • History of vitamin D deficiency   • Chronic atrial fibrillation (CMS/HCC)   • Pseudophakia of both eyes   • Left arm pain   • Gastroesophageal reflux disease without esophagitis   • Wellness examination   • Encounter for long-term (current) use of antiplatelets/antithrombotics   • Mixed hyperlipidemia       PATIENT REPORTED CHANGES: No changes with medications/diet or concerns currently per patient.      NURSE DOSING ADJUSTMENTS AND EDUCATION: Will increase regular warfarin dosing..          Patient will recheck INR in 1 week,sooner if changes or concerns develop.  Warfarin management done via phone according to protocol.  Reviewed signs and symptoms of bleeding and clotting with patient.  Reviewed and reinforced with patient the importance of calling the clinic with any medication, diet, and health related changes. Education on importance of adherence to consistent diet in Vitamin K and ETOH also discussed.   Jyoti Zavala RN     70
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
## 2024-10-09 NOTE — H&P PST ADULT - NSICDXPASTSURGICALHX_GEN_ALL_CORE_FT
PAST SURGICAL HISTORY:  H/O total hip arthroplasty, right Bilat    S/P colostomy 2/2024    S/P ORIF (open reduction internal fixation) fracture b/l legs    Suprapubic catheter      PAST SURGICAL HISTORY:  S/P cataract surgery     S/P colostomy 2/2024    S/P ORIF (open reduction internal fixation) fracture b/l legs    Suprapubic catheter      PAST SURGICAL HISTORY:  H/O shoulder surgery     S/P cataract surgery     S/P colostomy 2/2024    S/P ORIF (open reduction internal fixation) fracture b/l legs    Suprapubic catheter

## 2024-10-09 NOTE — H&P PST ADULT - PROBLEM SELECTOR PLAN 5
unable to obtain patient weight at PST as patient reports is unable to stand and pivot, PST does not have access to lift device to transfer patient for weight check. emailed surgeon/anesthesia that patient will need weight check on DOS, note left on outstanding items on PST flow sheet. surgeon and Rola THOMAS emailed. Tyron burnette.     stated weight 200lb

## 2024-10-09 NOTE — H&P PST ADULT - MUSCULOSKELETAL
details… no joint erythema/no joint warmth/no calf tenderness muscle atrophy RLE, LLE non-pit edema noted./no joint erythema/no joint warmth/no calf tenderness/decreased ROM/extremities exam

## 2024-10-09 NOTE — H&P PST ADULT - NSICDXPASTMEDICALHX_GEN_ALL_CORE_FT
PAST MEDICAL HISTORY:  Bladder outlet obstruction     BPH with obstruction/lower urinary tract symptoms     Colostomy status     Erectile dysfunction     H/O urinary retention suprapubic tube    HLD (hyperlipidemia)     Hypertension     Polio     Presence of suprapubic catheter     Type 2 diabetes mellitus      PAST MEDICAL HISTORY:  Bladder outlet obstruction     BPH with obstruction/lower urinary tract symptoms     Colostomy status     DVT, lower extremity     Erectile dysfunction     H/O urinary retention suprapubic tube    HLD (hyperlipidemia)     Hypertension     Polio     Presence of suprapubic catheter      PAST MEDICAL HISTORY:  Bladder outlet obstruction     BPH with obstruction/lower urinary tract symptoms     Colostomy status     DVT, lower extremity     Erectile dysfunction     H/O cardiac murmur     H/O urinary retention suprapubic tube    HLD (hyperlipidemia)     Hypertension     Polio     Presence of suprapubic catheter

## 2024-10-09 NOTE — H&P PST ADULT - PROBLEM SELECTOR PLAN 6
cap 6  Moderate Risk,  SCDs ordered for day of procedure.  Surgical team to assess need for VTE prophylaxis cap 10  High risk, SCDs ordered for day of procedure.  Surgical team to assess need for  pharmacological and mechanical measures for VTE prophylaxis

## 2024-10-09 NOTE — H&P PST ADULT - PROBLEM SELECTOR PLAN 2
site intact, draining clear urine, denies concerns site intact, draining clear urine to leg bag, denies concerns.

## 2024-10-09 NOTE — H&P PST ADULT - CARDIOVASCULAR
normal/regular rate and rhythm/S1 S2 present/no gallops/no rub/no murmur details… regular rate and rhythm/S1 S2 present/no gallops/no rub/no murmur/murmur

## 2024-10-09 NOTE — H&P PST ADULT - SKIN COMMENTS
hyperpigmented LLE. suprapubic catheter and colostomy both intact, no s/s infection or abnormal drainage noted.

## 2024-10-14 NOTE — ASU PATIENT PROFILE, ADULT - NSICDXPASTSURGICALHX_GEN_ALL_CORE_FT
PAST SURGICAL HISTORY:  S/P colostomy     Suprapubic catheter     
4 = No assist / stand by assistance

## 2024-10-16 PROBLEM — N40.1 BENIGN PROSTATIC HYPERPLASIA WITH LOWER URINARY TRACT SYMPTOMS: Chronic | Status: ACTIVE | Noted: 2024-10-09

## 2024-10-16 PROBLEM — N32.0 BLADDER-NECK OBSTRUCTION: Chronic | Status: ACTIVE | Noted: 2024-10-09

## 2024-10-16 PROBLEM — Z93.59 OTHER CYSTOSTOMY STATUS: Chronic | Status: ACTIVE | Noted: 2024-10-09

## 2024-10-16 PROBLEM — I82.409 ACUTE EMBOLISM AND THROMBOSIS OF UNSPECIFIED DEEP VEINS OF UNSPECIFIED LOWER EXTREMITY: Chronic | Status: ACTIVE | Noted: 2024-10-09

## 2024-10-16 PROBLEM — Z93.3 COLOSTOMY STATUS: Chronic | Status: ACTIVE | Noted: 2024-10-09

## 2024-10-16 PROBLEM — Z86.79 PERSONAL HISTORY OF OTHER DISEASES OF THE CIRCULATORY SYSTEM: Chronic | Status: ACTIVE | Noted: 2024-10-09

## 2024-10-16 PROBLEM — N52.9 MALE ERECTILE DYSFUNCTION, UNSPECIFIED: Chronic | Status: ACTIVE | Noted: 2024-10-09

## 2024-10-17 ENCOUNTER — TRANSCRIPTION ENCOUNTER (OUTPATIENT)
Age: 74
End: 2024-10-17

## 2024-10-17 ENCOUNTER — OUTPATIENT (OUTPATIENT)
Dept: INPATIENT UNIT | Facility: HOSPITAL | Age: 74
LOS: 1 days | Discharge: HOME | End: 2024-10-17
Payer: MEDICARE

## 2024-10-17 ENCOUNTER — NON-APPOINTMENT (OUTPATIENT)
Age: 74
End: 2024-10-17

## 2024-10-17 VITALS
OXYGEN SATURATION: 100 % | DIASTOLIC BLOOD PRESSURE: 99 MMHG | TEMPERATURE: 98 F | SYSTOLIC BLOOD PRESSURE: 168 MMHG | RESPIRATION RATE: 17 BRPM | HEART RATE: 72 BPM

## 2024-10-17 VITALS
TEMPERATURE: 98 F | DIASTOLIC BLOOD PRESSURE: 92 MMHG | RESPIRATION RATE: 16 BRPM | OXYGEN SATURATION: 100 % | SYSTOLIC BLOOD PRESSURE: 178 MMHG | HEIGHT: 68 IN | WEIGHT: 199.96 LBS | HEART RATE: 79 BPM

## 2024-10-17 DIAGNOSIS — Z93.59 OTHER CYSTOSTOMY STATUS: Chronic | ICD-10-CM

## 2024-10-17 DIAGNOSIS — Z98.49 CATARACT EXTRACTION STATUS, UNSPECIFIED EYE: Chronic | ICD-10-CM

## 2024-10-17 DIAGNOSIS — Z93.59 OTHER CYSTOSTOMY STATUS: ICD-10-CM

## 2024-10-17 DIAGNOSIS — Z98.890 OTHER SPECIFIED POSTPROCEDURAL STATES: Chronic | ICD-10-CM

## 2024-10-17 DIAGNOSIS — Z46.82 ENCOUNTER FOR FITTING AND ADJUSTMENT OF NON-VASCULAR CATHETER: ICD-10-CM

## 2024-10-17 DIAGNOSIS — Z93.3 COLOSTOMY STATUS: Chronic | ICD-10-CM

## 2024-10-17 PROCEDURE — 76080 X-RAY EXAM OF FISTULA: CPT

## 2024-10-17 PROCEDURE — 87186 SC STD MICRODIL/AGAR DIL: CPT

## 2024-10-17 PROCEDURE — 87086 URINE CULTURE/COLONY COUNT: CPT

## 2024-10-17 PROCEDURE — 87077 CULTURE AEROBIC IDENTIFY: CPT

## 2024-10-17 PROCEDURE — 76080 X-RAY EXAM OF FISTULA: CPT | Mod: 26

## 2024-10-17 PROCEDURE — 49424 ASSESS CYST CONTRAST INJECT: CPT

## 2024-10-17 RX ORDER — GABAPENTIN 800 MG/1
1 TABLET, FILM COATED ORAL
Refills: 0 | DISCHARGE

## 2024-10-17 RX ORDER — LEVOFLOXACIN 250 MG/1
250 TABLET, FILM COATED ORAL DAILY
Qty: 8 | Refills: 0 | Status: ACTIVE | COMMUNITY
Start: 2024-10-17 | End: 1900-01-01

## 2024-10-17 NOTE — ASU PATIENT PROFILE, ADULT - FALL HARM RISK - HARM RISK INTERVENTIONS

## 2024-10-17 NOTE — ASU DISCHARGE PLAN (ADULT/PEDIATRIC) - FINANCIAL ASSISTANCE
[facility] provides services at a reduced cost to those who are determined to be eligible through [facility]’s financial assistance program. Information regarding [facility]’s financial assistance program can be found by going to [email or web address] or by calling [phone].

## 2024-10-17 NOTE — ASU PATIENT PROFILE, ADULT - NSICDXPASTSURGICALHX_GEN_ALL_CORE_FT
PAST SURGICAL HISTORY:  H/O shoulder surgery     S/P cataract surgery right    S/P colostomy 2/2024    S/P ORIF (open reduction internal fixation) fracture b/l legs    Suprapubic catheter

## 2024-10-20 LAB
-  AZTREONAM: SIGNIFICANT CHANGE UP
-  CEFEPIME: SIGNIFICANT CHANGE UP
-  CEFTAZIDIME: SIGNIFICANT CHANGE UP
-  CIPROFLOXACIN: SIGNIFICANT CHANGE UP
-  IMIPENEM: SIGNIFICANT CHANGE UP
-  LEVOFLOXACIN: SIGNIFICANT CHANGE UP
-  MEROPENEM: SIGNIFICANT CHANGE UP
-  PIPERACILLIN/TAZOBACTAM: SIGNIFICANT CHANGE UP
CULTURE RESULTS: ABNORMAL
METHOD TYPE: SIGNIFICANT CHANGE UP
ORGANISM # SPEC MICROSCOPIC CNT: ABNORMAL
ORGANISM # SPEC MICROSCOPIC CNT: SIGNIFICANT CHANGE UP
SPECIMEN SOURCE: SIGNIFICANT CHANGE UP

## 2024-10-24 ENCOUNTER — APPOINTMENT (OUTPATIENT)
Dept: COLORECTAL SURGERY | Facility: HOSPITAL | Age: 74
End: 2024-10-24

## 2024-11-13 RX ORDER — SODIUM CHLORIDE 9 MG/ML
3 INJECTION, SOLUTION INTRAMUSCULAR; INTRAVENOUS; SUBCUTANEOUS EVERY 8 HOURS
Refills: 0 | Status: DISCONTINUED | OUTPATIENT
Start: 2024-11-20 | End: 2024-11-22

## 2024-11-13 RX ORDER — ALVIMOPAN 12 MG/1
12 CAPSULE ORAL ONCE
Refills: 0 | Status: COMPLETED | OUTPATIENT
Start: 2024-11-20 | End: 2024-11-20

## 2024-11-20 ENCOUNTER — RESULT REVIEW (OUTPATIENT)
Age: 74
End: 2024-11-20

## 2024-11-20 ENCOUNTER — APPOINTMENT (OUTPATIENT)
Dept: COLORECTAL SURGERY | Facility: HOSPITAL | Age: 74
End: 2024-11-20

## 2024-11-20 ENCOUNTER — INPATIENT (INPATIENT)
Facility: HOSPITAL | Age: 74
LOS: 1 days | Discharge: LEFT BEFORE DISCHARGE | DRG: 951 | End: 2024-11-22
Attending: COLON & RECTAL SURGERY | Admitting: COLON & RECTAL SURGERY
Payer: MEDICARE

## 2024-11-20 VITALS
RESPIRATION RATE: 16 BRPM | OXYGEN SATURATION: 99 % | SYSTOLIC BLOOD PRESSURE: 151 MMHG | HEIGHT: 68 IN | DIASTOLIC BLOOD PRESSURE: 89 MMHG | TEMPERATURE: 98 F | WEIGHT: 199.96 LBS | HEART RATE: 88 BPM

## 2024-11-20 DIAGNOSIS — Z93.59 OTHER CYSTOSTOMY STATUS: Chronic | ICD-10-CM

## 2024-11-20 DIAGNOSIS — Z93.3 COLOSTOMY STATUS: ICD-10-CM

## 2024-11-20 DIAGNOSIS — Z98.890 OTHER SPECIFIED POSTPROCEDURAL STATES: Chronic | ICD-10-CM

## 2024-11-20 DIAGNOSIS — Z93.3 COLOSTOMY STATUS: Chronic | ICD-10-CM

## 2024-11-20 DIAGNOSIS — Z98.49 CATARACT EXTRACTION STATUS, UNSPECIFIED EYE: Chronic | ICD-10-CM

## 2024-11-20 LAB
BLD GP AB SCN SERPL QL: SIGNIFICANT CHANGE UP
GLUCOSE BLDC GLUCOMTR-MCNC: 147 MG/DL — HIGH (ref 70–99)
GLUCOSE BLDC GLUCOMTR-MCNC: 294 MG/DL — HIGH (ref 70–99)

## 2024-11-20 PROCEDURE — 87116 MYCOBACTERIA CULTURE: CPT

## 2024-11-20 PROCEDURE — 93005 ELECTROCARDIOGRAM TRACING: CPT

## 2024-11-20 PROCEDURE — 87186 SC STD MICRODIL/AGAR DIL: CPT

## 2024-11-20 PROCEDURE — 82962 GLUCOSE BLOOD TEST: CPT

## 2024-11-20 PROCEDURE — 85730 THROMBOPLASTIN TIME PARTIAL: CPT

## 2024-11-20 PROCEDURE — 36415 COLL VENOUS BLD VENIPUNCTURE: CPT

## 2024-11-20 PROCEDURE — 86901 BLOOD TYPING SEROLOGIC RH(D): CPT

## 2024-11-20 PROCEDURE — 88304 TISSUE EXAM BY PATHOLOGIST: CPT | Mod: 26

## 2024-11-20 PROCEDURE — 71045 X-RAY EXAM CHEST 1 VIEW: CPT

## 2024-11-20 PROCEDURE — C9399: CPT

## 2024-11-20 PROCEDURE — S2900: CPT

## 2024-11-20 PROCEDURE — 87086 URINE CULTURE/COLONY COUNT: CPT

## 2024-11-20 PROCEDURE — 99222 1ST HOSP IP/OBS MODERATE 55: CPT | Mod: FS

## 2024-11-20 PROCEDURE — 86900 BLOOD TYPING SEROLOGIC ABO: CPT

## 2024-11-20 PROCEDURE — 83735 ASSAY OF MAGNESIUM: CPT

## 2024-11-20 PROCEDURE — 88304 TISSUE EXAM BY PATHOLOGIST: CPT

## 2024-11-20 PROCEDURE — 85610 PROTHROMBIN TIME: CPT

## 2024-11-20 PROCEDURE — 83036 HEMOGLOBIN GLYCOSYLATED A1C: CPT

## 2024-11-20 PROCEDURE — 87077 CULTURE AEROBIC IDENTIFY: CPT

## 2024-11-20 PROCEDURE — 80053 COMPREHEN METABOLIC PANEL: CPT

## 2024-11-20 PROCEDURE — 84100 ASSAY OF PHOSPHORUS: CPT

## 2024-11-20 PROCEDURE — 44227 LAP CLOSE ENTEROSTOMY: CPT

## 2024-11-20 PROCEDURE — 86850 RBC ANTIBODY SCREEN: CPT

## 2024-11-20 PROCEDURE — 85027 COMPLETE CBC AUTOMATED: CPT

## 2024-11-20 PROCEDURE — 85025 COMPLETE CBC W/AUTO DIFF WBC: CPT

## 2024-11-20 PROCEDURE — C1889: CPT

## 2024-11-20 PROCEDURE — G0463: CPT

## 2024-11-20 PROCEDURE — 87102 FUNGUS ISOLATION CULTURE: CPT

## 2024-11-20 PROCEDURE — 80048 BASIC METABOLIC PNL TOTAL CA: CPT

## 2024-11-20 RX ORDER — 0.9 % SODIUM CHLORIDE 0.9 %
1000 INTRAVENOUS SOLUTION INTRAVENOUS
Refills: 0 | Status: DISCONTINUED | OUTPATIENT
Start: 2024-11-20 | End: 2024-11-22

## 2024-11-20 RX ORDER — SEVELAMER CARBONATE 800 MG/1
800 TABLET, FILM COATED ORAL THREE TIMES A DAY
Refills: 0 | Status: DISCONTINUED | OUTPATIENT
Start: 2024-11-20 | End: 2024-11-22

## 2024-11-20 RX ORDER — OXYCODONE HYDROCHLORIDE 30 MG/1
5 TABLET ORAL ONCE
Refills: 0 | Status: DISCONTINUED | OUTPATIENT
Start: 2024-11-20 | End: 2024-11-20

## 2024-11-20 RX ORDER — ACETAMINOPHEN 500MG 500 MG/1
1000 TABLET, COATED ORAL ONCE
Refills: 0 | Status: COMPLETED | OUTPATIENT
Start: 2024-11-21 | End: 2024-11-21

## 2024-11-20 RX ORDER — ALVIMOPAN 12 MG/1
12 CAPSULE ORAL EVERY 12 HOURS
Refills: 0 | Status: DISCONTINUED | OUTPATIENT
Start: 2024-11-21 | End: 2024-11-22

## 2024-11-20 RX ORDER — OXYCODONE HYDROCHLORIDE 30 MG/1
5 TABLET ORAL EVERY 4 HOURS
Refills: 0 | Status: DISCONTINUED | OUTPATIENT
Start: 2024-11-20 | End: 2024-11-21

## 2024-11-20 RX ORDER — FENTANYL 12 UG/H
25 PATCH, EXTENDED RELEASE TRANSDERMAL
Refills: 0 | Status: DISCONTINUED | OUTPATIENT
Start: 2024-11-20 | End: 2024-11-20

## 2024-11-20 RX ORDER — HEPARIN SODIUM,PORCINE 1000/ML
5000 VIAL (ML) INJECTION ONCE
Refills: 0 | Status: COMPLETED | OUTPATIENT
Start: 2024-11-20 | End: 2024-11-20

## 2024-11-20 RX ORDER — ACETAMINOPHEN 500MG 500 MG/1
1000 TABLET, COATED ORAL EVERY 6 HOURS
Refills: 0 | Status: DISCONTINUED | OUTPATIENT
Start: 2024-11-20 | End: 2024-11-22

## 2024-11-20 RX ORDER — HYDROMORPHONE HYDROCHLORIDE 2 MG/1
0.5 TABLET ORAL
Refills: 0 | Status: DISCONTINUED | OUTPATIENT
Start: 2024-11-20 | End: 2024-11-20

## 2024-11-20 RX ORDER — ACETAMINOPHEN, DIPHENHYDRAMINE HCL, PHENYLEPHRINE HCL 325; 25; 5 MG/1; MG/1; MG/1
3 TABLET ORAL AT BEDTIME
Refills: 0 | Status: DISCONTINUED | OUTPATIENT
Start: 2024-11-20 | End: 2024-11-22

## 2024-11-20 RX ORDER — GABAPENTIN 300 MG/1
100 CAPSULE ORAL THREE TIMES A DAY
Refills: 0 | Status: DISCONTINUED | OUTPATIENT
Start: 2024-11-20 | End: 2024-11-22

## 2024-11-20 RX ORDER — GLUCAGON INJECTION, SOLUTION 0.5 MG/.1ML
1 INJECTION, SOLUTION SUBCUTANEOUS ONCE
Refills: 0 | Status: DISCONTINUED | OUTPATIENT
Start: 2024-11-20 | End: 2024-11-22

## 2024-11-20 RX ORDER — HEPARIN SODIUM,PORCINE 1000/ML
5000 VIAL (ML) INJECTION EVERY 8 HOURS
Refills: 0 | Status: DISCONTINUED | OUTPATIENT
Start: 2024-11-20 | End: 2024-11-22

## 2024-11-20 RX ORDER — ONDANSETRON HYDROCHLORIDE 4 MG/1
4 TABLET, FILM COATED ORAL ONCE
Refills: 0 | Status: DISCONTINUED | OUTPATIENT
Start: 2024-11-20 | End: 2024-11-20

## 2024-11-20 RX ORDER — 0.9 % SODIUM CHLORIDE 0.9 %
1000 INTRAVENOUS SOLUTION INTRAVENOUS
Refills: 0 | Status: DISCONTINUED | OUTPATIENT
Start: 2024-11-20 | End: 2024-11-20

## 2024-11-20 RX ORDER — ACETAMINOPHEN 500MG 500 MG/1
1000 TABLET, COATED ORAL ONCE
Refills: 0 | Status: COMPLETED | OUTPATIENT
Start: 2024-11-20 | End: 2024-11-20

## 2024-11-20 RX ADMIN — ALVIMOPAN 12 MILLIGRAM(S): 12 CAPSULE ORAL at 09:05

## 2024-11-20 RX ADMIN — HYDROMORPHONE HYDROCHLORIDE 0.5 MILLIGRAM(S): 2 TABLET ORAL at 14:59

## 2024-11-20 RX ADMIN — OXYCODONE HYDROCHLORIDE 5 MILLIGRAM(S): 30 TABLET ORAL at 22:15

## 2024-11-20 RX ADMIN — Medication 5000 UNIT(S): at 09:09

## 2024-11-20 RX ADMIN — HYDROMORPHONE HYDROCHLORIDE 0.5 MILLIGRAM(S): 2 TABLET ORAL at 16:30

## 2024-11-20 RX ADMIN — ACETAMINOPHEN, DIPHENHYDRAMINE HCL, PHENYLEPHRINE HCL 3 MILLIGRAM(S): 325; 25; 5 TABLET ORAL at 21:43

## 2024-11-20 RX ADMIN — SODIUM CHLORIDE 3 MILLILITER(S): 9 INJECTION, SOLUTION INTRAMUSCULAR; INTRAVENOUS; SUBCUTANEOUS at 21:11

## 2024-11-20 RX ADMIN — Medication 0: at 17:31

## 2024-11-20 RX ADMIN — OXYCODONE HYDROCHLORIDE 5 MILLIGRAM(S): 30 TABLET ORAL at 16:30

## 2024-11-20 RX ADMIN — ACETAMINOPHEN 500MG 1000 MILLIGRAM(S): 500 TABLET, COATED ORAL at 22:15

## 2024-11-20 RX ADMIN — HYDROMORPHONE HYDROCHLORIDE 0.5 MILLIGRAM(S): 2 TABLET ORAL at 15:30

## 2024-11-20 RX ADMIN — OXYCODONE HYDROCHLORIDE 5 MILLIGRAM(S): 30 TABLET ORAL at 14:59

## 2024-11-20 RX ADMIN — ACETAMINOPHEN 500MG 400 MILLIGRAM(S): 500 TABLET, COATED ORAL at 21:43

## 2024-11-20 RX ADMIN — OXYCODONE HYDROCHLORIDE 5 MILLIGRAM(S): 30 TABLET ORAL at 16:27

## 2024-11-20 RX ADMIN — Medication 5000 UNIT(S): at 21:43

## 2024-11-20 RX ADMIN — OXYCODONE HYDROCHLORIDE 5 MILLIGRAM(S): 30 TABLET ORAL at 21:43

## 2024-11-20 RX ADMIN — OXYCODONE HYDROCHLORIDE 5 MILLIGRAM(S): 30 TABLET ORAL at 17:22

## 2024-11-20 NOTE — BRIEF OPERATIVE NOTE - OPERATION/FINDINGS
An incision made around the stoma, dissected circumferentially until the fascia was reached and peritoneal cavity entered. the proximal and distal limbs identified, primary closure performed w primary anastomosis using stapler.  On laparoscopic exploration the anastomosis appeared intact with no bleeding. The anastomosis was covered with omentum. Hemostasis established, fascia was closed/ The ostomy reversal site was packed w packing strips and skin was closed w Purse-string suture

## 2024-11-20 NOTE — BRIEF OPERATIVE NOTE - NSICDXBRIEFPROCEDURE_GEN_ALL_CORE_FT
PROCEDURES:  Laparoscopic revision of colostomy 20-Nov-2024 14:50:19  Sanjay Partida   PROCEDURES:  Reversal of colostomy or ileostomy 20-Nov-2024 15:51:39  Sanjay Partida  Closure, colostomy, laparoscopic 20-Nov-2024 15:52:23  Sanjay Partida

## 2024-11-20 NOTE — CONSULT NOTE ADULT - ASSESSMENT
73yo M Pmhx anemia of chronic disease, CKD stage 4/ESRD, HTN, wheelchair bound.  hx DVT RLE 05/2024 2/2 surgical complication- was on eliquis now dc'd,  BPH with LUTS, hx bladder outlet obstruction, hx cystoscopy and suprapubic catheter placement complicated by intraop rectal injury in February 2024 necessitating  a creation of diverting sigmoid loop colostomy. patient is now s/p laparoscopic colostomy reversal with Dr Nicole.    *s/p laparoscopic colostomy reversal   -  pain control  -  incentive spirometry  -  OOB to chair  -  IVFs  -  diet per colorectal service  -  wound care per colorectal  -  Entereg  -  strict Is/Os  -  check labs in am  -  complete IV perioperative ABXs  -  encourage IS and ambulation  -  Antiemetics PRN     *anemia- likely AOCD vs NATHANIEL (previous workup in 2/2024)  - monitor    *HLD- lipitor     *CKD/ESRD   - monitor creatinine     DVT prophylaxis- venodynes  heparin when cleared bu surgery   encourage ambulation    Thank you for the consult. Will continue to follow with you.

## 2024-11-20 NOTE — CONSULT NOTE ADULT - SUBJECTIVE AND OBJECTIVE BOX
PCP:  Dr Mcdowell     CHIEF COMPLAINT: colostomy reversal     HISTORY OF THE PRESENT ILLNESS:  73yo M Pmhx anemia of chronic disease, CKD stage 4/ESRD, HTN, wheelchair bound.  hx DVT RLE 05/2024 2/2 surgical complication- was on eliquis now dc'd,  BPH with LUTS, hx bladder outlet obstruction, hx cystoscopy and suprapubic catheter placement complicated by intraop rectal injury in February 2024 necessitating  a creation of diverting sigmoid loop colostomy. patient is now s/p laparoscopic colostomy reversal with Dr Nicole. Patient was seen and examined in PACU- sleepy but arousable. Denies SP or SOB. VSS. Hospitalist consulted for post op medical management.     PAST MEDICAL & SURGICAL HISTORY:  Polio  Hypertension  H/O urinary retention  suprapubic tube  HLD (hyperlipidemia)  BPH with obstruction/lower urinary tract symptoms  Bladder outlet obstruction  Presence of suprapubic catheter  Colostomy status  DVT, lower extremity  H/O cardiac murmur  S/P colostomy  2/2024  Suprapubic catheter  S/P ORIF (open reduction internal fixation) fracture  b/l leg  S/P cataract surgery  right  H/O shoulder surgery    FAMILY HISTORY:  FH: type 2 diabetes    Social History: denies smoking  drinks on occasions  denies substance or drug use     Allergies  No Known Allergies  Intolerances    Home Medications:  diclofenac 1% topical gel: Apply topically to affected area 4 times a day (20 Nov 2024 08:41)  Flagyl 500 mg oral tablet: 1 tab(s) orally once a day (20 Nov 2024 08:41)  Jardiance 10 mg oral tablet: 1 tab(s) orally once a day (20 Nov 2024 08:41)  Lasix 40 mg oral tablet: 1 tab(s) orally 2 times a day (20 Nov 2024 08:41)  Lipitor 40 mg oral tablet: 1 tab(s) orally once a day (at bedtime) (20 Nov 2024 08:41)  neomycin 500 mg oral tablet: 2 tab(s) orally once a day (20 Nov 2024 08:41)  Neurontin 100 mg oral capsule: 1 cap(s) orally 3 times a day (20 Nov 2024 08:41)  sevelamer carbonate 800 mg oral tablet: 1 tab(s) orally 3 times a day with meals (20 Nov 2024 08:41)  sodium bicarbonate: 650 milligram(s) once a day (20 Nov 2024 08:41)    Vital Signs Last 24 Hrs  T(C): 36.7 (20 Nov 2024 08:47), Max: 36.7 (20 Nov 2024 08:47)  T(F): 98.1 (20 Nov 2024 08:47), Max: 98.1 (20 Nov 2024 08:47)  HR: 88 (20 Nov 2024 08:47) (88 - 88)  BP: 151/89 (20 Nov 2024 08:47) (151/89 - 151/89)  BP(mean): --  RR: 16 (20 Nov 2024 08:47) (16 - 16)  SpO2: 99% (20 Nov 2024 08:47) (99% - 99%)      REVIEW OF SYSTEMS:   All 10 systems reviewed in detailed and found to be negative with the exception of what has already been described above    MEDICATIONS  (STANDING):  alvimopan 12 milliGRAM(s) Oral once  atorvastatin 40 milliGRAM(s) Oral at bedtime  gabapentin 100 milliGRAM(s) Oral three times a day  lactated ringers. 1000 milliLiter(s) (100 mL/Hr) IV Continuous <Continuous>  sevelamer carbonate 800 milliGRAM(s) Oral three times a day    MEDICATIONS  (PRN):  fentaNYL    Injectable 25 MICROGram(s) IV Push every 5 minutes PRN Moderate Pain (4 - 6)  HYDROmorphone  Injectable 0.5 milliGRAM(s) IV Push every 15 minutes PRN Severe Pain (7 - 10)  ondansetron Injectable 4 milliGRAM(s) IV Push once PRN Nausea and/or Vomiting      PE:  Constitutional: NAD, laying in bed  HEENT: NC/AT  Back: no tenderness  Respiratory: respirations even and non labored, LCTA- diminished at the base   Cardiovascular: S1S2 regular, no murmurs  Abdomen: soft, not tender, not distended, absent BS   Genitourinary: suprapubic catheter intact   Musculoskeletal: no muscle tenderness, no joint swelling or tenderness  Extremities: no pedal edema   Neurological: no focal deficits    LABS:    post op labs- pending

## 2024-11-20 NOTE — CONSULT NOTE ADULT - NS ATTEND AMEND GEN_ALL_CORE FT
Patient seen and examined with JENNIFER Mcclain.  I was physically present for the key portions of the evaluation and management (E/M) service provided.  I agree with the above history, physical, and plan which I have reviewed and edited where appropriate.   75yo M Pmhx anemia of chronic disease, CKD stage 4/ESRD, HTN, wheelchair bound.  hx DVT RLE 05/2024 2/2 surgical complication- was on eliquis now dc'd,  BPH with LUTS, hx bladder outlet obstruction, hx cystoscopy and suprapubic catheter placement complicated by intraop rectal injury in February 2024 necessitating  a creation of diverting sigmoid loop colostomy. patient is now s/p laparoscopic colostomy reversal with Dr Nicole.    abd - soft + dressing in place    *s/p laparoscopic colostomy reversal   - plan as per colorectal surgery   -  pain control  -  incentive spirometry  -  OOB to chair  -  IVFs  -  diet per colorectal service  -  wound care per colorectal  -  Entereg  -  strict Is/Os  -  check labs in am  -  complete IV perioperative ABXs  -  encourage IS and ambulation  -  Antiemetics PRN     *anemia- likely AOCD vs NATHANIEL (previous workup in 2/2024)  - monitor    *HLD- lipitor     *CKD/ESRD   - monitor creatinine     DVT prophylaxis- venodynes  heparin when cleared bu surgery   encourage ambulation    Thank you for the consult. Will continue to follow with you.

## 2024-11-21 ENCOUNTER — TRANSCRIPTION ENCOUNTER (OUTPATIENT)
Age: 74
End: 2024-11-21

## 2024-11-21 LAB
ANION GAP SERPL CALC-SCNC: 9 MMOL/L — SIGNIFICANT CHANGE UP (ref 5–17)
BUN SERPL-MCNC: 83 MG/DL — HIGH (ref 7–23)
CALCIUM SERPL-MCNC: 7.7 MG/DL — LOW (ref 8.5–10.1)
CHLORIDE SERPL-SCNC: 101 MMOL/L — SIGNIFICANT CHANGE UP (ref 96–108)
CO2 SERPL-SCNC: 23 MMOL/L — SIGNIFICANT CHANGE UP (ref 22–31)
CREAT SERPL-MCNC: 5.06 MG/DL — HIGH (ref 0.5–1.3)
EGFR: 11 ML/MIN/1.73M2 — LOW
GLUCOSE SERPL-MCNC: 219 MG/DL — HIGH (ref 70–99)
HCT VFR BLD CALC: 30.2 % — LOW (ref 39–50)
HGB BLD-MCNC: 9.7 G/DL — LOW (ref 13–17)
MAGNESIUM SERPL-MCNC: 2.2 MG/DL — SIGNIFICANT CHANGE UP (ref 1.6–2.6)
MCHC RBC-ENTMCNC: 28.8 PG — SIGNIFICANT CHANGE UP (ref 27–34)
MCHC RBC-ENTMCNC: 32.1 G/DL — SIGNIFICANT CHANGE UP (ref 32–36)
MCV RBC AUTO: 89.6 FL — SIGNIFICANT CHANGE UP (ref 80–100)
NIGHT BLUE STAIN TISS: SIGNIFICANT CHANGE UP
PHOSPHATE SERPL-MCNC: 8.4 MG/DL — HIGH (ref 2.5–4.5)
PLATELET # BLD AUTO: 202 K/UL — SIGNIFICANT CHANGE UP (ref 150–400)
POTASSIUM SERPL-MCNC: 4.1 MMOL/L — SIGNIFICANT CHANGE UP (ref 3.5–5.3)
POTASSIUM SERPL-SCNC: 4.1 MMOL/L — SIGNIFICANT CHANGE UP (ref 3.5–5.3)
RBC # BLD: 3.37 M/UL — LOW (ref 4.2–5.8)
RBC # FLD: 13.2 % — SIGNIFICANT CHANGE UP (ref 10.3–14.5)
SODIUM SERPL-SCNC: 133 MMOL/L — LOW (ref 135–145)
SPECIMEN SOURCE: SIGNIFICANT CHANGE UP
WBC # BLD: 8.77 K/UL — SIGNIFICANT CHANGE UP (ref 3.8–10.5)
WBC # FLD AUTO: 8.77 K/UL — SIGNIFICANT CHANGE UP (ref 3.8–10.5)

## 2024-11-21 PROCEDURE — 99232 SBSQ HOSP IP/OBS MODERATE 35: CPT

## 2024-11-21 PROCEDURE — 71045 X-RAY EXAM CHEST 1 VIEW: CPT | Mod: 26

## 2024-11-21 PROCEDURE — 99223 1ST HOSP IP/OBS HIGH 75: CPT

## 2024-11-21 RX ORDER — OXYCODONE AND ACETAMINOPHEN 5; 325 MG/1; MG/1
1 TABLET ORAL
Qty: 20 | Refills: 0
Start: 2024-11-21

## 2024-11-21 RX ORDER — OXYCODONE HYDROCHLORIDE 30 MG/1
10 TABLET ORAL EVERY 4 HOURS
Refills: 0 | Status: DISCONTINUED | OUTPATIENT
Start: 2024-11-21 | End: 2024-11-22

## 2024-11-21 RX ORDER — OXYCODONE HYDROCHLORIDE 30 MG/1
5 TABLET ORAL ONCE
Refills: 0 | Status: DISCONTINUED | OUTPATIENT
Start: 2024-11-21 | End: 2024-11-21

## 2024-11-21 RX ORDER — METRONIDAZOLE 500 MG/1
1 TABLET ORAL
Refills: 0 | DISCHARGE

## 2024-11-21 RX ORDER — BENZOCAINE, MENTHOL 15; 3.6 MG/1; MG/1
1 LOZENGE ORAL
Refills: 0 | Status: DISCONTINUED | OUTPATIENT
Start: 2024-11-21 | End: 2024-11-22

## 2024-11-21 RX ORDER — GUAIFENESIN 400 MG
1200 TABLET ORAL EVERY 12 HOURS
Refills: 0 | Status: DISCONTINUED | OUTPATIENT
Start: 2024-11-21 | End: 2024-11-22

## 2024-11-21 RX ORDER — ACETAMINOPHEN 500MG 500 MG/1
2 TABLET, COATED ORAL
Qty: 0 | Refills: 0 | DISCHARGE
Start: 2024-11-21

## 2024-11-21 RX ORDER — NEOMYCIN SULFATE 500 MG/1
2 TABLET ORAL
Refills: 0 | DISCHARGE

## 2024-11-21 RX ORDER — OXYCODONE HYDROCHLORIDE 30 MG/1
5 TABLET ORAL EVERY 4 HOURS
Refills: 0 | Status: DISCONTINUED | OUTPATIENT
Start: 2024-11-21 | End: 2024-11-22

## 2024-11-21 RX ORDER — METHOCARBAMOL 500 MG/1
1000 TABLET, FILM COATED ORAL ONCE
Refills: 0 | Status: COMPLETED | OUTPATIENT
Start: 2024-11-21 | End: 2024-11-21

## 2024-11-21 RX ADMIN — ACETAMINOPHEN, DIPHENHYDRAMINE HCL, PHENYLEPHRINE HCL 3 MILLIGRAM(S): 325; 25; 5 TABLET ORAL at 22:09

## 2024-11-21 RX ADMIN — OXYCODONE HYDROCHLORIDE 10 MILLIGRAM(S): 30 TABLET ORAL at 22:39

## 2024-11-21 RX ADMIN — Medication 1200 MILLIGRAM(S): at 14:10

## 2024-11-21 RX ADMIN — OXYCODONE HYDROCHLORIDE 5 MILLIGRAM(S): 30 TABLET ORAL at 10:36

## 2024-11-21 RX ADMIN — SEVELAMER CARBONATE 800 MILLIGRAM(S): 800 TABLET, FILM COATED ORAL at 22:09

## 2024-11-21 RX ADMIN — OXYCODONE HYDROCHLORIDE 5 MILLIGRAM(S): 30 TABLET ORAL at 01:20

## 2024-11-21 RX ADMIN — METHOCARBAMOL 220 MILLIGRAM(S): 500 TABLET, FILM COATED ORAL at 01:12

## 2024-11-21 RX ADMIN — Medication 5000 UNIT(S): at 05:44

## 2024-11-21 RX ADMIN — OXYCODONE HYDROCHLORIDE 10 MILLIGRAM(S): 30 TABLET ORAL at 22:09

## 2024-11-21 RX ADMIN — SEVELAMER CARBONATE 800 MILLIGRAM(S): 800 TABLET, FILM COATED ORAL at 14:11

## 2024-11-21 RX ADMIN — ACETAMINOPHEN 500MG 400 MILLIGRAM(S): 500 TABLET, COATED ORAL at 03:56

## 2024-11-21 RX ADMIN — SODIUM CHLORIDE 3 MILLILITER(S): 9 INJECTION, SOLUTION INTRAMUSCULAR; INTRAVENOUS; SUBCUTANEOUS at 05:19

## 2024-11-21 RX ADMIN — Medication 5000 UNIT(S): at 18:36

## 2024-11-21 RX ADMIN — BENZOCAINE, MENTHOL 1 LOZENGE: 15; 3.6 LOZENGE ORAL at 01:12

## 2024-11-21 RX ADMIN — Medication 5000 UNIT(S): at 22:09

## 2024-11-21 RX ADMIN — BENZOCAINE, MENTHOL 1 LOZENGE: 15; 3.6 LOZENGE ORAL at 03:56

## 2024-11-21 RX ADMIN — ACETAMINOPHEN 500MG 1000 MILLIGRAM(S): 500 TABLET, COATED ORAL at 03:56

## 2024-11-21 RX ADMIN — BENZOCAINE, MENTHOL 1 LOZENGE: 15; 3.6 LOZENGE ORAL at 10:33

## 2024-11-21 RX ADMIN — Medication 50 MILLILITER(S): at 03:56

## 2024-11-21 RX ADMIN — BENZOCAINE, MENTHOL 1 LOZENGE: 15; 3.6 LOZENGE ORAL at 06:05

## 2024-11-21 RX ADMIN — SODIUM CHLORIDE 3 MILLILITER(S): 9 INJECTION, SOLUTION INTRAMUSCULAR; INTRAVENOUS; SUBCUTANEOUS at 16:46

## 2024-11-21 RX ADMIN — OXYCODONE HYDROCHLORIDE 5 MILLIGRAM(S): 30 TABLET ORAL at 00:48

## 2024-11-21 NOTE — CONSULT NOTE ADULT - SUBJECTIVE AND OBJECTIVE BOX
NEPHROLOGY CONSULT  HPI:  75yo M Pmhx anemia of chronic disease, CKD stage 4/ESRD, HTN, wheelchair bound.  hx DVT RLE 05/2024 2/2 surgical complication- was on eliquis now dc'd,  BPH with LUTS, hx bladder outlet obstruction, hx cystoscopy and suprapubic catheter placement complicated by intraop rectal injury in February 2024 necessitating  a creation of diverting sigmoid loop colostomy. patient is now s/p laparoscopic colostomy reversal with Dr Nicole.           PAST MEDICAL & SURGICAL HISTORY:  Polio      Hypertension      H/O urinary retention  suprapubic tube      HLD (hyperlipidemia)      BPH with obstruction/lower urinary tract symptoms      Erectile dysfunction      Bladder outlet obstruction      Presence of suprapubic catheter      Colostomy status      DVT, lower extremity      H/O cardiac murmur      S/P colostomy  2/2024      Suprapubic catheter      S/P ORIF (open reduction internal fixation) fracture  b/l legs      S/P cataract surgery  right      H/O shoulder surgery          FAMILY HISTORY:  FH: type 2 diabetes        MEDICATIONS  (STANDING):  acetaminophen     Tablet .. 1000 milliGRAM(s) Oral every 6 hours  acetaminophen   IVPB .. 1000 milliGRAM(s) IV Intermittent once  alvimopan 12 milliGRAM(s) Oral every 12 hours  atorvastatin 40 milliGRAM(s) Oral at bedtime  dextrose 5%. 1000 milliLiter(s) (50 mL/Hr) IV Continuous <Continuous>  dextrose 5%. 1000 milliLiter(s) (100 mL/Hr) IV Continuous <Continuous>  dextrose 50% Injectable 25 Gram(s) IV Push once  dextrose 50% Injectable 25 Gram(s) IV Push once  gabapentin 100 milliGRAM(s) Oral three times a day  glucagon  Injectable 1 milliGRAM(s) IntraMuscular once  guaiFENesin ER 1200 milliGRAM(s) Oral every 12 hours  heparin   Injectable 5000 Unit(s) SubCutaneous every 8 hours  insulin lispro (ADMELOG) corrective regimen sliding scale   SubCutaneous three times a day before meals  insulin lispro (ADMELOG) corrective regimen sliding scale   SubCutaneous at bedtime  lactated ringers. 1000 milliLiter(s) (50 mL/Hr) IV Continuous <Continuous>  melatonin 3 milliGRAM(s) Oral at bedtime  sevelamer carbonate 800 milliGRAM(s) Oral three times a day  sodium chloride 0.9% lock flush 3 milliLiter(s) IV Push every 8 hours    MEDICATIONS  (PRN):  benzocaine/menthol Lozenge 1 Lozenge Oral every 2 hours PRN Sore Throat  dextrose Oral Gel 15 Gram(s) Oral once PRN Blood Glucose LESS THAN 70 milliGRAM(s)/deciliter  oxyCODONE    IR 10 milliGRAM(s) Oral every 4 hours PRN breakthrough pain  oxyCODONE    IR 5 milliGRAM(s) Oral every 4 hours PRN Severe Pain (7 - 10)      Allergies    No Known Allergies    Intolerances        I&O's Summary    20 Nov 2024 07:01  -  21 Nov 2024 07:00  --------------------------------------------------------  IN: 2100 mL / OUT: 1525 mL / NET: 575 mL    21 Nov 2024 07:01  -  21 Nov 2024 16:44  --------------------------------------------------------  IN: 480 mL / OUT: 500 mL / NET: -20 mL          REVIEW OF SYSTEMS:    CONSTITUTIONAL:  As per HPI.  CONSTITUTIONAL: No weakness, fevers or chills  EYES/ENT: No visual changes;  No vertigo or throat pain   NECK: No pain or stiffness  CARDIOVASCULAR: No chest pain or palpitations  GASTROINTESTINAL: No abdominal or epigastric pain. No nausea, vomiting, or hematemesis; No diarrhea or constipation. No melena or hematochezia.  GENITOURINARY: No dysuria, frequency or hematuria  NEUROLOGICAL: No numbness or weakness  SKIN: No itching, burning, rashes, or lesions   All other review of systems is negative unless indicated above      Vital Signs Last 24 Hrs  T(C): 36.4 (21 Nov 2024 07:10), Max: 36.8 (20 Nov 2024 20:15)  T(F): 97.6 (21 Nov 2024 07:10), Max: 98.2 (20 Nov 2024 20:15)  HR: 77 (21 Nov 2024 07:10) (77 - 94)  BP: 180/81 (21 Nov 2024 07:10) (129/76 - 181/90)  BP(mean): --  RR: 17 (21 Nov 2024 07:10) (12 - 20)  SpO2: 96% (21 Nov 2024 07:10) (96% - 98%)    Parameters below as of 21 Nov 2024 07:10  Patient On (Oxygen Delivery Method): room air        Daily     Daily     I&O's Summary    20 Nov 2024 07:01  -  21 Nov 2024 07:00  --------------------------------------------------------  IN: 2100 mL / OUT: 1525 mL / NET: 575 mL    21 Nov 2024 07:01  -  21 Nov 2024 16:44  --------------------------------------------------------  IN: 480 mL / OUT: 500 mL / NET: -20 mL        PHYSICAL EXAM:    General:  Alert, No acute distress.    Neuro:  Alert and oriented to person, place, and time. Able to communicate  well.      HEENT:  No JVD, no masses, Eyes anicteric, ,    Cardiovascular:  Regular rate and rhythm, with normal S1 and S2.    Lungs:  clear. no rales, no wheezing, .    Abdomen:  Normoactive bowel sounds. Soft, flat, non-tender, and non-distended.  positive bowel sounds    Skin:  Warm, dry    Extremities:  warm,  no cyanosis    LABS:                        9.7    8.77  )-----------( 202      ( 21 Nov 2024 08:03 )             30.2     11-21    133[L]  |  101  |  83[H]  ----------------------------<  219[H]  4.1   |  23  |  5.06[H]    Ca    7.7[L]      21 Nov 2024 08:03  Phos  8.4     11-21  Mg     2.2     11-21        Urinalysis Basic - ( 21 Nov 2024 08:03 )    Color: x / Appearance: x / SG: x / pH: x  Gluc: 219 mg/dL / Ketone: x  / Bili: x / Urobili: x   Blood: x / Protein: x / Nitrite: x   Leuk Esterase: x / RBC: x / WBC x   Sq Epi: x / Non Sq Epi: x / Bacteria: x      Magnesium: 2.2 mg/dL (11-21 @ 08:03)  Phosphorus: 8.4 mg/dL (11-21 @ 08:03)       NEPHROLOGY CONSULT  HPI:  75yo M Pmhx anemia of chronic disease, CKD stage 4/ESRD, HTN, wheelchair bound.  hx DVT RLE 05/2024 2/2 surgical complication- was on eliquis now dc'd,  BPH with LUTS, hx bladder outlet obstruction, hx cystoscopy and suprapubic catheter placement complicated by intraop rectal injury in February 2024 necessitating  a creation of diverting sigmoid loop colostomy. patient is now s/p laparoscopic colostomy reversal with Dr Nicole.     Nephrology  Pts previous admission reviewed  CKD 4-5 creat baseline 4+ range as per patient  S/P reversal of colostomy  Creat 5,   Pt states creat has been 4+ for 12 years and does not follow w nephrology          PAST MEDICAL & SURGICAL HISTORY:  Polio      Hypertension      H/O urinary retention  suprapubic tube      HLD (hyperlipidemia)      BPH with obstruction/lower urinary tract symptoms      Erectile dysfunction      Bladder outlet obstruction      Presence of suprapubic catheter      Colostomy status      DVT, lower extremity      H/O cardiac murmur      S/P colostomy  2/2024      Suprapubic catheter      S/P ORIF (open reduction internal fixation) fracture  b/l legs      S/P cataract surgery  right      H/O shoulder surgery          FAMILY HISTORY:  FH: type 2 diabetes        MEDICATIONS  (STANDING):  acetaminophen     Tablet .. 1000 milliGRAM(s) Oral every 6 hours  acetaminophen   IVPB .. 1000 milliGRAM(s) IV Intermittent once  alvimopan 12 milliGRAM(s) Oral every 12 hours  atorvastatin 40 milliGRAM(s) Oral at bedtime  dextrose 5%. 1000 milliLiter(s) (50 mL/Hr) IV Continuous <Continuous>  dextrose 5%. 1000 milliLiter(s) (100 mL/Hr) IV Continuous <Continuous>  dextrose 50% Injectable 25 Gram(s) IV Push once  dextrose 50% Injectable 25 Gram(s) IV Push once  gabapentin 100 milliGRAM(s) Oral three times a day  glucagon  Injectable 1 milliGRAM(s) IntraMuscular once  guaiFENesin ER 1200 milliGRAM(s) Oral every 12 hours  heparin   Injectable 5000 Unit(s) SubCutaneous every 8 hours  insulin lispro (ADMELOG) corrective regimen sliding scale   SubCutaneous three times a day before meals  insulin lispro (ADMELOG) corrective regimen sliding scale   SubCutaneous at bedtime  lactated ringers. 1000 milliLiter(s) (50 mL/Hr) IV Continuous <Continuous>  melatonin 3 milliGRAM(s) Oral at bedtime  sevelamer carbonate 800 milliGRAM(s) Oral three times a day  sodium chloride 0.9% lock flush 3 milliLiter(s) IV Push every 8 hours    MEDICATIONS  (PRN):  benzocaine/menthol Lozenge 1 Lozenge Oral every 2 hours PRN Sore Throat  dextrose Oral Gel 15 Gram(s) Oral once PRN Blood Glucose LESS THAN 70 milliGRAM(s)/deciliter  oxyCODONE    IR 10 milliGRAM(s) Oral every 4 hours PRN breakthrough pain  oxyCODONE    IR 5 milliGRAM(s) Oral every 4 hours PRN Severe Pain (7 - 10)      Allergies    No Known Allergies    Intolerances        I&O's Summary    20 Nov 2024 07:01  -  21 Nov 2024 07:00  --------------------------------------------------------  IN: 2100 mL / OUT: 1525 mL / NET: 575 mL    21 Nov 2024 07:01  -  21 Nov 2024 16:44  --------------------------------------------------------  IN: 480 mL / OUT: 500 mL / NET: -20 mL          REVIEW OF SYSTEMS:        Vital Signs Last 24 Hrs  T(C): 36.4 (21 Nov 2024 07:10), Max: 36.8 (20 Nov 2024 20:15)  T(F): 97.6 (21 Nov 2024 07:10), Max: 98.2 (20 Nov 2024 20:15)  HR: 77 (21 Nov 2024 07:10) (77 - 94)  BP: 180/81 (21 Nov 2024 07:10) (129/76 - 181/90)  BP(mean): --  RR: 17 (21 Nov 2024 07:10) (12 - 20)  SpO2: 96% (21 Nov 2024 07:10) (96% - 98%)    Parameters below as of 21 Nov 2024 07:10  Patient On (Oxygen Delivery Method): room air        Daily     Daily     Vital Signs Last 24 Hrs  T(C): 36.4 (21 Nov 2024 16:00), Max: 36.8 (20 Nov 2024 20:15)  T(F): 97.6 (21 Nov 2024 16:00), Max: 98.2 (20 Nov 2024 20:15)  HR: 76 (21 Nov 2024 16:00) (76 - 94)  BP: 175/79 (21 Nov 2024 16:00) (143/73 - 181/90)  BP(mean): 103 (21 Nov 2024 16:00) (103 - 103)  RR: 17 (21 Nov 2024 16:00) (17 - 20)  SpO2: 98% (21 Nov 2024 16:00) (96% - 98%)    Parameters below as of 21 Nov 2024 16:00  Patient On (Oxygen Delivery Method): room air          PHYSICAL EXAM:    General:  Alert, No acute distress.    Neuro:  Alert and oriented to person, place, and time. Able to communicate  well.      HEENT:  No JVD, no masses, Eyes anicteric, ,    Cardiovascular:  Regular rate and rhythm, with normal S1 and S2.    Lungs:  clear. no rales, no wheezing, .    Abdomen:  Normoactive bowel sounds.  Soft distended non tender    Skin:  Warm, dry    Extremities:  warm,  no cyanosis    LABS:                        9.7    8.77  )-----------( 202      ( 21 Nov 2024 08:03 )             30.2     11-21    133[L]  |  101  |  83[H]  ----------------------------<  219[H]  4.1   |  23  |  5.06[H]    Ca    7.7[L]      21 Nov 2024 08:03  Phos  8.4     11-21  Mg     2.2     11-21        Urinalysis Basic - ( 21 Nov 2024 08:03 )    Color: x / Appearance: x / SG: x / pH: x  Gluc: 219 mg/dL / Ketone: x  / Bili: x / Urobili: x   Blood: x / Protein: x / Nitrite: x   Leuk Esterase: x / RBC: x / WBC x   Sq Epi: x / Non Sq Epi: x / Bacteria: x      Magnesium: 2.2 mg/dL (11-21 @ 08:03)  Phosphorus: 8.4 mg/dL (11-21 @ 08:03)

## 2024-11-21 NOTE — PROGRESS NOTE ADULT - SUBJECTIVE AND OBJECTIVE BOX
CHIEF COMPLAINT: colostomy reversal     HISTORY OF THE PRESENT ILLNESS:  73yo M Pmhx anemia of chronic disease, CKD stage 4/ESRD, HTN, wheelchair bound.  hx DVT RLE 05/2024 2/2 surgical complication- was on eliquis now dc'd,  BPH with LUTS, hx bladder outlet obstruction, hx cystoscopy and suprapubic catheter placement complicated by intraop rectal injury in February 2024 necessitating  a creation of diverting sigmoid loop colostomy. patient is now s/p laparoscopic colostomy reversal with Dr Nicole.     11/21- patient was seen and examined- complaining of incessant cough-denies SOB, cp or fever, +abdominal pain when he coughs. VSS.    REVIEW OF SYSTEMS:   All 10 systems reviewed in detailed and found to be negative with the exception of what has already been described above    Vital Signs Last 24 Hrs  T(C): 36.4 (21 Nov 2024 07:10), Max: 36.8 (20 Nov 2024 20:15)  T(F): 97.6 (21 Nov 2024 07:10), Max: 98.2 (20 Nov 2024 20:15)  HR: 77 (21 Nov 2024 07:10) (72 - 94)  BP: 180/81 (21 Nov 2024 07:10) (129/76 - 181/90)  BP(mean): --  RR: 17 (21 Nov 2024 07:10) (12 - 20)  SpO2: 96% (21 Nov 2024 07:10) (94% - 98%)    Parameters below as of 21 Nov 2024 07:10  Patient On (Oxygen Delivery Method): room air      PE:  Constitutional: NAD, laying in bed  HEENT: NC/AT  Back: no tenderness  Respiratory: respirations even and non labored, LCTA- diminished at the base   Cardiovascular: S1S2 regular, no murmurs  Abdomen: soft, not tender, not distended, absent BS   Genitourinary: suprapubic catheter intact   Musculoskeletal: no muscle tenderness, no joint swelling or tenderness  Extremities: no pedal edema   Neurological: no focal deficits      MEDICATIONS  (STANDING):  acetaminophen     Tablet .. 1000 milliGRAM(s) Oral every 6 hours  acetaminophen   IVPB .. 1000 milliGRAM(s) IV Intermittent once  alvimopan 12 milliGRAM(s) Oral every 12 hours  atorvastatin 40 milliGRAM(s) Oral at bedtime  dextrose 5%. 1000 milliLiter(s) (50 mL/Hr) IV Continuous <Continuous>  dextrose 5%. 1000 milliLiter(s) (100 mL/Hr) IV Continuous <Continuous>  dextrose 50% Injectable 25 Gram(s) IV Push once  dextrose 50% Injectable 25 Gram(s) IV Push once  gabapentin 100 milliGRAM(s) Oral three times a day  glucagon  Injectable 1 milliGRAM(s) IntraMuscular once  guaiFENesin ER 1200 milliGRAM(s) Oral every 12 hours  heparin   Injectable 5000 Unit(s) SubCutaneous every 8 hours  insulin lispro (ADMELOG) corrective regimen sliding scale   SubCutaneous three times a day before meals  insulin lispro (ADMELOG) corrective regimen sliding scale   SubCutaneous at bedtime  lactated ringers. 1000 milliLiter(s) (50 mL/Hr) IV Continuous <Continuous>  melatonin 3 milliGRAM(s) Oral at bedtime  sevelamer carbonate 800 milliGRAM(s) Oral three times a day  sodium chloride 0.9% lock flush 3 milliLiter(s) IV Push every 8 hours    MEDICATIONS  (PRN):  benzocaine/menthol Lozenge 1 Lozenge Oral every 2 hours PRN Sore Throat  dextrose Oral Gel 15 Gram(s) Oral once PRN Blood Glucose LESS THAN 70 milliGRAM(s)/deciliter  oxyCODONE    IR 10 milliGRAM(s) Oral every 4 hours PRN breakthrough pain  oxyCODONE    IR 5 milliGRAM(s) Oral every 4 hours PRN Severe Pain (7 - 10)    LABS:      11-21    133[L]  |  101  |  83[H]  ----------------------------<  219[H]  4.1   |  23  |  5.06[H]    Ca    7.7[L]      21 Nov 2024 08:03  Phos  8.4     11-21  Mg     2.2     11-21                            9.7    8.77  )-----------( 202      ( 21 Nov 2024 08:03 )             30.2              CHIEF COMPLAINT: colostomy reversal     HISTORY OF THE PRESENT ILLNESS:  75yo M Pmhx anemia of chronic disease, CKD stage 4/ESRD, HTN, wheelchair bound.  hx DVT RLE 05/2024 2/2 surgical complication- was on eliquis now dc'd,  BPH with LUTS, hx bladder outlet obstruction, hx cystoscopy and suprapubic catheter placement complicated by intraop rectal injury in February 2024 necessitating  a creation of diverting sigmoid loop colostomy. patient is now s/p laparoscopic colostomy reversal with Dr Nicole.     11/21- patient was seen and examined- complaining of incessant cough-denies SOB, cp or fever, +abdominal pain when he coughs. VSS.    REVIEW OF SYSTEMS:   All 10 systems reviewed in detailed and found to be negative with the exception of what has already been described above    Vital Signs Last 24 Hrs  T(C): 36.4 (21 Nov 2024 07:10), Max: 36.8 (20 Nov 2024 20:15)  T(F): 97.6 (21 Nov 2024 07:10), Max: 98.2 (20 Nov 2024 20:15)  HR: 77 (21 Nov 2024 07:10) (72 - 94)  BP: 180/81 (21 Nov 2024 07:10) (129/76 - 181/90)  BP(mean): --  RR: 17 (21 Nov 2024 07:10) (12 - 20)  SpO2: 96% (21 Nov 2024 07:10) (94% - 98%)    Parameters below as of 21 Nov 2024 07:10  Patient On (Oxygen Delivery Method): room air      PE:  Constitutional: NAD, laying in bed  HEENT: NC/AT  Back: no tenderness  Respiratory: respirations even and non labored, LCTA- diminished at the base   Cardiovascular: S1S2 regular, no murmurs  Abdomen: soft, not tender, not distended, absent BS   Genitourinary: suprapubic catheter intact   Musculoskeletal: no muscle tenderness, no joint swelling or tenderness  Extremities: no pedal edema   Neurological: no focal deficits      MEDICATIONS  (STANDING):  acetaminophen     Tablet .. 1000 milliGRAM(s) Oral every 6 hours  acetaminophen   IVPB .. 1000 milliGRAM(s) IV Intermittent once  alvimopan 12 milliGRAM(s) Oral every 12 hours  atorvastatin 40 milliGRAM(s) Oral at bedtime  dextrose 5%. 1000 milliLiter(s) (50 mL/Hr) IV Continuous <Continuous>  dextrose 5%. 1000 milliLiter(s) (100 mL/Hr) IV Continuous <Continuous>  dextrose 50% Injectable 25 Gram(s) IV Push once  dextrose 50% Injectable 25 Gram(s) IV Push once  gabapentin 100 milliGRAM(s) Oral three times a day  glucagon  Injectable 1 milliGRAM(s) IntraMuscular once  guaiFENesin ER 1200 milliGRAM(s) Oral every 12 hours  heparin   Injectable 5000 Unit(s) SubCutaneous every 8 hours  insulin lispro (ADMELOG) corrective regimen sliding scale   SubCutaneous three times a day before meals  insulin lispro (ADMELOG) corrective regimen sliding scale   SubCutaneous at bedtime  lactated ringers. 1000 milliLiter(s) (50 mL/Hr) IV Continuous <Continuous>  melatonin 3 milliGRAM(s) Oral at bedtime  sevelamer carbonate 800 milliGRAM(s) Oral three times a day  sodium chloride 0.9% lock flush 3 milliLiter(s) IV Push every 8 hours    MEDICATIONS  (PRN):  benzocaine/menthol Lozenge 1 Lozenge Oral every 2 hours PRN Sore Throat  dextrose Oral Gel 15 Gram(s) Oral once PRN Blood Glucose LESS THAN 70 milliGRAM(s)/deciliter  oxyCODONE    IR 10 milliGRAM(s) Oral every 4 hours PRN breakthrough pain  oxyCODONE    IR 5 milliGRAM(s) Oral every 4 hours PRN Severe Pain (7 - 10)    LABS:      11-21    133[L]  |  101  |  83[H]  ----------------------------<  219[H]  4.1   |  23  |  5.06[H]    Ca    7.7[L]      21 Nov 2024 08:03  Phos  8.4     11-21  Mg     2.2     11-21      Creatinine: 5.06 mg/dL (11.21.24 @ 08:03)   Creatinine: 4.19 mg/dL (10.09.24 @ 13:30)   Creatinine: 4.10 mg/dL (09.30.24 @ 08:32)   Creatinine: 3.30 mg/dL (06.26.24 @ 09:41)   Creatinine: 3.06 mg/dL (05.13.24 @ 07:22)   Creatinine: 2.90 mg/dL (04.08.24 @ 08:29)   Creatinine: 3.55 mg/dL (03.04.24 @ 16:03)   Creatinine: 3.49 mg/dL (02.21.24 @ 07:30)   Creatinine: 3.45 mg/dL (02.20.24 @ 07:00)                             9.7    8.77  )-----------( 202 ( 21 Nov 2024 08:03 )             30.2

## 2024-11-21 NOTE — CONSULT NOTE ADULT - ASSESSMENT
75yo M Pmhx anemia of chronic disease, CKD stage 4/ESRD, HTN, wheelchair bound.  hx DVT RLE 05/2024 2/2 surgical complication- was on eliquis now dc'd,  BPH with LUTS, hx bladder outlet obstruction, hx cystoscopy and suprapubic catheter placement complicated by intraop rectal injury in February 2024 necessitating  a creation of diverting sigmoid loop colostomy. patient is now s/p laparoscopic colostomy reversal with Dr Nicole.     Nephrology  Pts previous admission reviewed  CKD 4-5 creat baseline 4+ range as per patient  S/P reversal of colostomy  Creat 5,   Pt states creat has been 4+ for 12 years and does not follow w nephrology    A/P  CKD 4-5  s/p reversal of colostomy  SONDRA/ Azotemia creat >5  ON ivf, will most likely improve w ivf  RECC pt follow up w Dr Soares his nephrologist after DC  Carlos Lima / Anderson to follow as of 11/22

## 2024-11-21 NOTE — PROVIDER CONTACT NOTE (OTHER) - SITUATION
Pt initial BG was 294 at bedtime. Pt refused insulin coverage despite education provided. Instructed by MD previously to recheck BG at 0000 and pt still refusing to have blood sugar rechecked.

## 2024-11-21 NOTE — DISCHARGE NOTE PROVIDER - NSDCCPCAREPLAN_GEN_ALL_CORE_FT
PRINCIPAL DISCHARGE DIAGNOSIS  Diagnosis: Colostomy in place  Assessment and Plan of Treatment:

## 2024-11-21 NOTE — PROGRESS NOTE ADULT - SUBJECTIVE AND OBJECTIVE BOX
SURGERY DAILY PROGRESS NOTE:     Subjective:  Difficulties with patient care postop, including patient expressing frustration that people don't understand what it is like to take care of a polio victim--that he runs hypertensive in the 160s-180s because of his polio. He refused insulin overnight and threatened to leave AMA if he is put on a diabetic diet. He refused recheck of blood glucose fingerstick overnight. He refused checking BP on both arms, for more accurate readings. This AM patient is resting comfortably. Pain responsive to meds. Tolerated liquids, no trial of solids yet. Denies fever/chills, shortness of breath, chest pain, bowel function. VS reviewed    Objective:    MEDICATIONS  (STANDING):  acetaminophen     Tablet .. 1000 milliGRAM(s) Oral every 6 hours  acetaminophen   IVPB .. 1000 milliGRAM(s) IV Intermittent once  acetaminophen   IVPB .. 1000 milliGRAM(s) IV Intermittent once  acetaminophen   IVPB .. 1000 milliGRAM(s) IV Intermittent once  alvimopan 12 milliGRAM(s) Oral every 12 hours  atorvastatin 40 milliGRAM(s) Oral at bedtime  dextrose 5%. 1000 milliLiter(s) (50 mL/Hr) IV Continuous <Continuous>  dextrose 5%. 1000 milliLiter(s) (100 mL/Hr) IV Continuous <Continuous>  dextrose 50% Injectable 25 Gram(s) IV Push once  dextrose 50% Injectable 25 Gram(s) IV Push once  gabapentin 100 milliGRAM(s) Oral three times a day  glucagon  Injectable 1 milliGRAM(s) IntraMuscular once  heparin   Injectable 5000 Unit(s) SubCutaneous every 8 hours  insulin lispro (ADMELOG) corrective regimen sliding scale   SubCutaneous three times a day before meals  insulin lispro (ADMELOG) corrective regimen sliding scale   SubCutaneous at bedtime  lactated ringers. 1000 milliLiter(s) (50 mL/Hr) IV Continuous <Continuous>  melatonin 3 milliGRAM(s) Oral at bedtime  sevelamer carbonate 800 milliGRAM(s) Oral three times a day  sodium chloride 0.9% lock flush 3 milliLiter(s) IV Push every 8 hours    MEDICATIONS  (PRN):  benzocaine/menthol Lozenge 1 Lozenge Oral every 2 hours PRN Sore Throat  dextrose Oral Gel 15 Gram(s) Oral once PRN Blood Glucose LESS THAN 70 milliGRAM(s)/deciliter  oxyCODONE    IR 5 milliGRAM(s) Oral every 4 hours PRN Severe Pain (7 - 10)      Vital Signs Last 24 Hrs  T(C): 36.6 (21 Nov 2024 00:30), Max: 36.8 (20 Nov 2024 20:15)  T(F): 97.9 (21 Nov 2024 00:30), Max: 98.2 (20 Nov 2024 20:15)  HR: 86 (21 Nov 2024 00:30) (72 - 94)  BP: 175/92 (21 Nov 2024 00:30) (129/76 - 181/90)  BP(mean): --  RR: 17 (21 Nov 2024 00:30) (12 - 20)  SpO2: 98% (21 Nov 2024 00:30) (94% - 99%)    Parameters below as of 21 Nov 2024 00:30  Patient On (Oxygen Delivery Method): room air          PHYSICAL EXAM   GENERAL: NAD, well developed, obese  HEAD: Atraumatic, normocephalic  EYES: EOMI, PERRLA, conjunctiva and sclera clear  ENT: moist mucous membrane  NECK: supple, No JVD, midline trachea  CHEST/LUNG: No increased WOB, symmetric excursions  Heart: RRR ppp, no peripheral edema  ABDOMEN: Round, nondistended, soft, appropriately tender, former stoma dressing w/ thin SS strikethrough, SP tube w/ clear light yellow output in bag  EXTREMITIES: Brisk cap refill. no clubbing or cyanosis  NERVOUS SYSTEM: AOx4, speech clear, anxious  MSK: full ROM, no deformities  SKIN: warm to touch, no rash or lesions      I&O's Detail    20 Nov 2024 07:01  -  21 Nov 2024 03:22  --------------------------------------------------------  IN:    Lactated Ringers: 500 mL    Other (mL): 1000 mL  Total IN: 1500 mL    OUT:    Indwelling Catheter - Suprapubic (mL): 1100 mL  Total OUT: 1100 mL    Total NET: 400 mL          Daily Height in cm: 172.72 (20 Nov 2024 08:47)    Daily

## 2024-11-21 NOTE — PROGRESS NOTE ADULT - ATTENDING COMMENTS
Patient POD#1 s/p lap assisted colostomy reversal.  Denies N/V, endorses flatus though no BM yet.  Pain controlled.  Complaining of poor sleep last night.  UOP via suprapubic catheter - still cloudy, culture was taken in OR yesterday.  Vitals with noted hypertension but afebrile.  Labs - Cr elevated from baseline (~4) to 5.06.    -- IS, encourage OOB to chair/wheelchair  -- Multimodal pain control  -- Low fiber diet  -- Wound care - remove packing tomorrow, place gauze dressing  -- PT  -- Hospitalist for co-management of comorbidities  -- Will also consult Nephrology given patient's history of CKD  -- Would plan to set up patient for outpatient visit with his PCP for post-hospitalization assessment next week  -- Anticipate potential discharge tomorrow if continues to do well, pending recommendations of Medicine/Nephrology.

## 2024-11-21 NOTE — DISCHARGE NOTE PROVIDER - HOSPITAL COURSE
Pt presented electively for colostomy reversal. No colorectal postop issues, he had return of bowel function, pain controlled. Tolerating diet.     Other medical input while hospitalized:     Urology consulted for pt's suprapubic tube management. Pt seen at bedside reports he gets his routine SPT changes with IR.   Patient's last SPT changed by IR on 9/30/24 and readjusted on 10/17/24. Discussed with IR and they recommend changes Q 3 months.  Discussed with Dr. Xiao who states patient can follow up outpatient for further management.    renal: CKD 4-5  s/p reversal of colostomy  SONDRA/ Azotemia creat >5  ON ivf, will most likely improve w ivf  RECC pt follow up w Dr Soares his nephrologist after DC.    follow up with PCP for medical management.          Pt presented electively for colostomy reversal. No colorectal postop issues, he had return of bowel function, pain controlled. Tolerating diet. Pt non-complaint and refusing medications in hospital.     Other medical input while hospitalized:     Urology consulted for pt's suprapubic tube management. Pt seen at bedside reports he gets his routine SPT changes with IR.   Patient's last SPT changed by IR on 9/30/24 and readjusted on 10/17/24. Discussed with IR and they recommend changes Q 3 months.  Discussed with Dr. Xiao who states patient can follow up outpatient for further management.    renal: CKD 4-5  s/p reversal of colostomy  SONDRA/ Azotemia creat >5  ON ivf, will most likely improve w ivf  RECC pt follow up w Dr Soares his nephrologist after DC.    follow up with PCP for medical management.

## 2024-11-21 NOTE — DISCHARGE NOTE PROVIDER - CARE PROVIDER_API CALL
Genoveva Nicole  Colon/Rectal Surgery  [address] [facility] [address] [facility], [address] [place] [address]  Phone: [phone]  Fax: [phone]  Follow Up Time: 2 weeks

## 2024-11-21 NOTE — PROGRESS NOTE ADULT - ASSESSMENT
75 yo M w/ DM, HTN, CKD, PTSD after iatrogenic injury to rectum requiring diverting loop colostomy, now POD 1 s/p lap-assisted colostomy reversal. Recovering as expected, awaiting return of bowel function    Plan  -Continue low residue diet  -Remove packing tomorrow  -Encourage OOB/ambulation/PT eval  -HL IV w/ adequate PO  -Multimodal pain control  -VTE ppx w/ HSQ  -Urology c/s for SPT  -Hospitalist c/s    Will discuss w/ CRS team & update accordingly

## 2024-11-21 NOTE — PROVIDER CONTACT NOTE (OTHER) - ACTION/TREATMENT ORDERED:
Education provided to patient importance of checking blood sugar postoperatively as ordered, pt still refusing, MD aware.

## 2024-11-21 NOTE — CONSULT NOTE ADULT - ASSESSMENT
73 yo male with hx of DM, HTN, CKD, urinary retention s/p SPT placement and iatrogenic injury to rectum requiring diverting loop colostomy, now POD 1 s/p lap-assisted colostomy reversal. Urology consulted for pt's suprapubic tube management. Pt seen at bedside reports he gets his routine SPT changes with IR.   Patient's last SPT changed by IR on 9/30/24 and readjusted on 10/17/24. Discussed with IR and they recommend changes Q 3 months.  Discussed with Dr. Xiao who states patient can follow up outpatient for further management    Case discussed with Dr. Xiao

## 2024-11-21 NOTE — PROGRESS NOTE ADULT - ASSESSMENT
75yo M Pmhx anemia of chronic disease, CKD stage 4/ESRD, HTN, wheelchair bound.  hx DVT RLE 05/2024 2/2 surgical complication- was on eliquis now dc'd,  BPH with LUTS, hx bladder outlet obstruction, hx cystoscopy and suprapubic catheter placement complicated by intraop rectal injury in February 2024 necessitating  a creation of diverting sigmoid loop colostomy. patient is now s/p laparoscopic colostomy reversal with Dr Nicole.    *s/p laparoscopic colostomy reversal   -  pain control  -  incentive spirometry  -  OOB to chair  -  IVFs  -  diet per colorectal service  -  wound care per colorectal  -  Entereg  -  strict Is/Os  -  check labs in am  -  complete IV perioperative ABXs  -  encourage IS and ambulation  -  Antiemetics PRN     *cough-   - mucinex and cepacol  - encourage IS - afebrile   - check CXRAY    *anemia- likely AOCD vs NATHANIEL (previous workup in 2/2024)  - monitor      *HLD- lipitor     *CKD/ESRD   - monitor creatinine     DVT prophylaxis- venodynes  heparin when cleared bu surgery   encourage ambulation    Thank you for the consult. Will continue to follow with you.  75yo M Pmhx anemia of chronic disease, CKD stage 4/ESRD, HTN, wheelchair bound.  hx DVT RLE 05/2024 2/2 surgical complication- was on eliquis now dc'd,  BPH with LUTS, hx bladder outlet obstruction, hx cystoscopy and suprapubic catheter placement complicated by intraop rectal injury in February 2024 necessitating  a creation of diverting sigmoid loop colostomy. patient is now s/p laparoscopic colostomy reversal with Dr Nicole.    *s/p laparoscopic colostomy reversal   -  pain control  -  incentive spirometry  -  OOB to chair  -  IVFs  -  diet per colorectal service  -  wound care per colorectal  -  Entereg  -  strict Is/Os  -  check labs in am  -  complete IV perioperative ABXs  -  encourage IS and ambulation  -  Antiemetics PRN   -  awaiting return of bowel function     *cough-   - mucinex and cepacol  - encourage IS - afebrile   - check CXRAY    *anemia- likely AOCD vs NATHANIEL (previous workup in 2/2024)  - monitor      *HLD- lipitor     *CKD/ESRD  - SP catheter intact- draining yellow colored urine   - monitor creatinine   - Creatinine 5.06  - renally dose meds  - nephrology consult pending     DVT prophylaxis- venodynes  heparin when cleared bu surgery   encourage ambulation    Thank you for the consult. Will continue to follow with you.

## 2024-11-21 NOTE — CONSULT NOTE ADULT - SUBJECTIVE AND OBJECTIVE BOX
HPI:  75 yo male with hx of DM, HTN, CKD, urinary retention s/p SPT placement and iatrogenic injury to rectum requiring diverting loop colostomy, now POD 1 s/p lap-assisted colostomy reversal. Urology consulted for pt with     PAST MEDICAL & SURGICAL HISTORY:  Polio      Hypertension      H/O urinary retention  suprapubic tube      HLD (hyperlipidemia)      BPH with obstruction/lower urinary tract symptoms      Erectile dysfunction      Bladder outlet obstruction      Presence of suprapubic catheter      Colostomy status      DVT, lower extremity      H/O cardiac murmur      S/P colostomy  2/2024      Suprapubic catheter      S/P ORIF (open reduction internal fixation) fracture  b/l legs      S/P cataract surgery  right      H/O shoulder surgery          REVIEW OF SYSTEMS      General:	    Skin/Breast:  	  Ophthalmologic:  	  ENMT:	    Respiratory and Thorax:  	  Cardiovascular:	    Gastrointestinal:	    Genitourinary:	    Musculoskeletal:	    Neurological:	    Psychiatric:	    Hematology/Lymphatics:	    Endocrine:	    Allergic/Immunologic:	    MEDICATIONS  (STANDING):  acetaminophen     Tablet .. 1000 milliGRAM(s) Oral every 6 hours  acetaminophen   IVPB .. 1000 milliGRAM(s) IV Intermittent once  acetaminophen   IVPB .. 1000 milliGRAM(s) IV Intermittent once  alvimopan 12 milliGRAM(s) Oral every 12 hours  atorvastatin 40 milliGRAM(s) Oral at bedtime  dextrose 5%. 1000 milliLiter(s) (50 mL/Hr) IV Continuous <Continuous>  dextrose 5%. 1000 milliLiter(s) (100 mL/Hr) IV Continuous <Continuous>  dextrose 50% Injectable 25 Gram(s) IV Push once  dextrose 50% Injectable 25 Gram(s) IV Push once  gabapentin 100 milliGRAM(s) Oral three times a day  glucagon  Injectable 1 milliGRAM(s) IntraMuscular once  guaiFENesin ER 1200 milliGRAM(s) Oral every 12 hours  heparin   Injectable 5000 Unit(s) SubCutaneous every 8 hours  insulin lispro (ADMELOG) corrective regimen sliding scale   SubCutaneous three times a day before meals  insulin lispro (ADMELOG) corrective regimen sliding scale   SubCutaneous at bedtime  lactated ringers. 1000 milliLiter(s) (50 mL/Hr) IV Continuous <Continuous>  melatonin 3 milliGRAM(s) Oral at bedtime  sevelamer carbonate 800 milliGRAM(s) Oral three times a day  sodium chloride 0.9% lock flush 3 milliLiter(s) IV Push every 8 hours    MEDICATIONS  (PRN):  benzocaine/menthol Lozenge 1 Lozenge Oral every 2 hours PRN Sore Throat  dextrose Oral Gel 15 Gram(s) Oral once PRN Blood Glucose LESS THAN 70 milliGRAM(s)/deciliter  oxyCODONE    IR 10 milliGRAM(s) Oral every 4 hours PRN breakthrough pain  oxyCODONE    IR 5 milliGRAM(s) Oral every 4 hours PRN Severe Pain (7 - 10)      Allergies    No Known Allergies    Intolerances        SOCIAL HISTORY:    FAMILY HISTORY:  FH: type 2 diabetes        Vital Signs Last 24 Hrs  T(C): 36.4 (21 Nov 2024 07:10), Max: 36.8 (20 Nov 2024 20:15)  T(F): 97.6 (21 Nov 2024 07:10), Max: 98.2 (20 Nov 2024 20:15)  HR: 77 (21 Nov 2024 07:10) (72 - 94)  BP: 180/81 (21 Nov 2024 07:10) (129/76 - 181/90)  BP(mean): --  RR: 17 (21 Nov 2024 07:10) (12 - 20)  SpO2: 96% (21 Nov 2024 07:10) (94% - 98%)    Parameters below as of 21 Nov 2024 07:10  Patient On (Oxygen Delivery Method): room air        PHYSICAL EXAM:      Constitutional:    Eyes:    ENMT:    Neck:    Breasts:    Back:    Respiratory:    Cardiovascular:    Gastrointestinal:    Genitourinary:    Rectal:    Extremities:    Vascular:    Neurological:    Skin:    Lymph Nodes:    Musculoskeletal:    Psychiatric:        LABS:                        9.7    8.77  )-----------( 202      ( 21 Nov 2024 08:03 )             30.2     11-21    133[L]  |  101  |  83[H]  ----------------------------<  219[H]  4.1   |  23  |  5.06[H]    Ca    7.7[L]      21 Nov 2024 08:03  Phos  8.4     11-21  Mg     2.2     11-21        Urinalysis Basic - ( 21 Nov 2024 08:03 )    Color: x / Appearance: x / SG: x / pH: x  Gluc: 219 mg/dL / Ketone: x  / Bili: x / Urobili: x   Blood: x / Protein: x / Nitrite: x   Leuk Esterase: x / RBC: x / WBC x   Sq Epi: x / Non Sq Epi: x / Bacteria: x        RADIOLOGY & ADDITIONAL STUDIES: HPI:  75 yo male with hx of DM, HTN, CKD, urinary retention s/p SPT placement and iatrogenic injury to rectum requiring diverting loop colostomy, now POD 1 s/p lap-assisted colostomy reversal. Urology consulted for pt's suprapubic tube management. Pt seen at bedside reports he gets his routine SPT changes with IR. SPT draining well. Reports some post op incisional pain, denies any hematuria.     PAST MEDICAL & SURGICAL HISTORY:  Polio      Hypertension      H/O urinary retention  suprapubic tube      HLD (hyperlipidemia)      BPH with obstruction/lower urinary tract symptoms      Erectile dysfunction      Bladder outlet obstruction      Presence of suprapubic catheter      Colostomy status      DVT, lower extremity      H/O cardiac murmur      S/P colostomy  2/2024      Suprapubic catheter      S/P ORIF (open reduction internal fixation) fracture  b/l legs      S/P cataract surgery  right      H/O shoulder surgery          REVIEW OF SYSTEMS:   All other review of systems neg, except as noted in HPI    MEDICATIONS  (STANDING):  acetaminophen     Tablet .. 1000 milliGRAM(s) Oral every 6 hours  acetaminophen   IVPB .. 1000 milliGRAM(s) IV Intermittent once  acetaminophen   IVPB .. 1000 milliGRAM(s) IV Intermittent once  alvimopan 12 milliGRAM(s) Oral every 12 hours  atorvastatin 40 milliGRAM(s) Oral at bedtime  dextrose 5%. 1000 milliLiter(s) (50 mL/Hr) IV Continuous <Continuous>  dextrose 5%. 1000 milliLiter(s) (100 mL/Hr) IV Continuous <Continuous>  dextrose 50% Injectable 25 Gram(s) IV Push once  dextrose 50% Injectable 25 Gram(s) IV Push once  gabapentin 100 milliGRAM(s) Oral three times a day  glucagon  Injectable 1 milliGRAM(s) IntraMuscular once  guaiFENesin ER 1200 milliGRAM(s) Oral every 12 hours  heparin   Injectable 5000 Unit(s) SubCutaneous every 8 hours  insulin lispro (ADMELOG) corrective regimen sliding scale   SubCutaneous three times a day before meals  insulin lispro (ADMELOG) corrective regimen sliding scale   SubCutaneous at bedtime  lactated ringers. 1000 milliLiter(s) (50 mL/Hr) IV Continuous <Continuous>  melatonin 3 milliGRAM(s) Oral at bedtime  sevelamer carbonate 800 milliGRAM(s) Oral three times a day  sodium chloride 0.9% lock flush 3 milliLiter(s) IV Push every 8 hours    MEDICATIONS  (PRN):  benzocaine/menthol Lozenge 1 Lozenge Oral every 2 hours PRN Sore Throat  dextrose Oral Gel 15 Gram(s) Oral once PRN Blood Glucose LESS THAN 70 milliGRAM(s)/deciliter  oxyCODONE    IR 10 milliGRAM(s) Oral every 4 hours PRN breakthrough pain  oxyCODONE    IR 5 milliGRAM(s) Oral every 4 hours PRN Severe Pain (7 - 10)      Allergies    No Known Allergies    Intolerances        SOCIAL HISTORY:    FAMILY HISTORY:  FH: type 2 diabetes        Vital Signs Last 24 Hrs  T(C): 36.4 (21 Nov 2024 07:10), Max: 36.8 (20 Nov 2024 20:15)  T(F): 97.6 (21 Nov 2024 07:10), Max: 98.2 (20 Nov 2024 20:15)  HR: 77 (21 Nov 2024 07:10) (72 - 94)  BP: 180/81 (21 Nov 2024 07:10) (129/76 - 181/90)  BP(mean): --  RR: 17 (21 Nov 2024 07:10) (12 - 20)  SpO2: 96% (21 Nov 2024 07:10) (94% - 98%)    Parameters below as of 21 Nov 2024 07:10  Patient On (Oxygen Delivery Method): room air        PHYSICAL EXAM:    VITALS  T(C): 36.4 (11-21-24 @ 07:10), Max: 36.8 (11-20-24 @ 20:15)  HR: 77 (11-21-24 @ 07:10) (72 - 94)  BP: 180/81 (11-21-24 @ 07:10) (129/76 - 181/90)  RR: 17 (11-21-24 @ 07:10) (12 - 20)  SpO2: 96% (11-21-24 @ 07:10) (94% - 98%)            Skin     : No jaundice   Lung    : No resp distress  Abdo:   : Soft, No guarding, No distension, abd dressings in place. SPT in place with yellow urine  Genitalia Male: No Murillo       LABS:                        9.7    8.77  )-----------( 202      ( 21 Nov 2024 08:03 )             30.2     11-21    133[L]  |  101  |  83[H]  ----------------------------<  219[H]  4.1   |  23  |  5.06[H]    Ca    7.7[L]      21 Nov 2024 08:03  Phos  8.4     11-21  Mg     2.2     11-21        Urinalysis Basic - ( 21 Nov 2024 08:03 )    Color: x / Appearance: x / SG: x / pH: x  Gluc: 219 mg/dL / Ketone: x  / Bili: x / Urobili: x   Blood: x / Protein: x / Nitrite: x   Leuk Esterase: x / RBC: x / WBC x   Sq Epi: x / Non Sq Epi: x / Bacteria: x

## 2024-11-21 NOTE — DISCHARGE NOTE PROVIDER - NSDCFUADDINST_GEN_ALL_CORE_FT
Cover wound with gauze and tape, change daily and as needed if soiled, no need to pack, may shower daily    Please read the instructions outlined below and refer to them for the next few weeks. These discharge instructions provide you with general information on caring for yourself after surgery. While your treatment has been planned according to the most current medical practices available, unavoidable complications occasionally occur. If you have any problems or questions after discharge, please call your surgeon.    DIET: Soft foods. No salads or raw/steamed fruit/vegetables for the first two weeks.  Eat six smaller  meals, rather than three main meals for the first week or two. It commonly takes 2-3 weeks for  the bowel pattern to normalize. During that time, take nothing stronger than prune juice (4-6 oz.  warmed) to encourage a bowel movement.    ACTIVITY: It is also normal to feel tired and take naps in the afternoon. Avoid lifting over 10 pounds. You may shower, but no tub bathing. Stairs are okay. You may ride in a car. Walking is encouraged. You may drive if not on oral narcotic  pain meds. Be advised narcotics such as Percocet can cauze drowsiness.     MEDICATIONS: (Please refer to the hospital discharge medication form)    Resume all your usual pre-surgery medicines.  It is normal to be sore for 2-4 weeks following surgery. Call your surgeon if this seems to be getting worse rather than better. Only take over-the-counter or prescription medicines for pain, discomfort, or fever as directed by your surgeon.    CALL YOUR SURGEONS OFFICE IF YOU DEVELOP:    An wound which becomes red, swollen, increasingly painful or begins to bleed/drain.  An unexplained temperature over 101° F (38.3° C).  Experience worsening abdominal pain, nausea, or vomiting.  Bleeding with bowel movements    FOLLOW UP:  Notes the date for your after surgery appointment. Your postoperative course and pathology report will be reviewed. All questions will be answered, and continued care instructions given.

## 2024-11-21 NOTE — DISCHARGE NOTE PROVIDER - NSDCMRMEDTOKEN_GEN_ALL_CORE_FT
acetaminophen 500 mg oral tablet: 2 tab(s) orally every 6 hours as needed for  mild pain  diclofenac 1% topical gel: Apply topically to affected area 4 times a day  Jardiance 10 mg oral tablet: 1 tab(s) orally once a day  Lasix 40 mg oral tablet: 1 tab(s) orally 2 times a day  Lipitor 40 mg oral tablet: 1 tab(s) orally once a day (at bedtime)  Neurontin 100 mg oral capsule: 1 cap(s) orally 3 times a day  oxycodone-acetaminophen 5 mg-325 mg oral tablet: 1 tab(s) orally every 6 hours as needed for  moderate pain MDD: 004  sevelamer carbonate 800 mg oral tablet: 1 tab(s) orally 3 times a day with meals  sodium bicarbonate: 650 milligram(s) once a day

## 2024-11-22 VITALS
DIASTOLIC BLOOD PRESSURE: 78 MMHG | SYSTOLIC BLOOD PRESSURE: 154 MMHG | OXYGEN SATURATION: 95 % | RESPIRATION RATE: 18 BRPM | HEART RATE: 73 BPM | TEMPERATURE: 98 F

## 2024-11-22 LAB
ALBUMIN SERPL ELPH-MCNC: 2.7 G/DL — LOW (ref 3.3–5)
ALP SERPL-CCNC: 82 U/L — SIGNIFICANT CHANGE UP (ref 40–120)
ALT FLD-CCNC: 8 U/L — LOW (ref 12–78)
ANION GAP SERPL CALC-SCNC: 11 MMOL/L — SIGNIFICANT CHANGE UP (ref 5–17)
AST SERPL-CCNC: 10 U/L — LOW (ref 15–37)
BILIRUB SERPL-MCNC: 0.5 MG/DL — SIGNIFICANT CHANGE UP (ref 0.2–1.2)
BUN SERPL-MCNC: 81 MG/DL — HIGH (ref 7–23)
CALCIUM SERPL-MCNC: 7.6 MG/DL — LOW (ref 8.5–10.1)
CHLORIDE SERPL-SCNC: 102 MMOL/L — SIGNIFICANT CHANGE UP (ref 96–108)
CO2 SERPL-SCNC: 21 MMOL/L — LOW (ref 22–31)
CREAT SERPL-MCNC: 5.21 MG/DL — HIGH (ref 0.5–1.3)
EGFR: 11 ML/MIN/1.73M2 — LOW
GLUCOSE SERPL-MCNC: 119 MG/DL — HIGH (ref 70–99)
HCT VFR BLD CALC: 30.7 % — LOW (ref 39–50)
HGB BLD-MCNC: 9.9 G/DL — LOW (ref 13–17)
MAGNESIUM SERPL-MCNC: 2.1 MG/DL — SIGNIFICANT CHANGE UP (ref 1.6–2.6)
MCHC RBC-ENTMCNC: 28.9 PG — SIGNIFICANT CHANGE UP (ref 27–34)
MCHC RBC-ENTMCNC: 32.2 G/DL — SIGNIFICANT CHANGE UP (ref 32–36)
MCV RBC AUTO: 89.8 FL — SIGNIFICANT CHANGE UP (ref 80–100)
PHOSPHATE SERPL-MCNC: 8.2 MG/DL — HIGH (ref 2.5–4.5)
PLATELET # BLD AUTO: 182 K/UL — SIGNIFICANT CHANGE UP (ref 150–400)
POTASSIUM SERPL-MCNC: 3.6 MMOL/L — SIGNIFICANT CHANGE UP (ref 3.5–5.3)
POTASSIUM SERPL-SCNC: 3.6 MMOL/L — SIGNIFICANT CHANGE UP (ref 3.5–5.3)
PROT SERPL-MCNC: 6.7 GM/DL — SIGNIFICANT CHANGE UP (ref 6–8.3)
RBC # BLD: 3.42 M/UL — LOW (ref 4.2–5.8)
RBC # FLD: 13.6 % — SIGNIFICANT CHANGE UP (ref 10.3–14.5)
SODIUM SERPL-SCNC: 134 MMOL/L — LOW (ref 135–145)
WBC # BLD: 6.77 K/UL — SIGNIFICANT CHANGE UP (ref 3.8–10.5)
WBC # FLD AUTO: 6.77 K/UL — SIGNIFICANT CHANGE UP (ref 3.8–10.5)

## 2024-11-22 PROCEDURE — 99233 SBSQ HOSP IP/OBS HIGH 50: CPT

## 2024-11-22 RX ORDER — FUROSEMIDE 40 MG/1
1 TABLET ORAL
Refills: 0 | DISCHARGE

## 2024-11-22 RX ORDER — SODIUM CHLORIDE 9 MG/ML
1000 INJECTION, SOLUTION INTRAMUSCULAR; INTRAVENOUS; SUBCUTANEOUS
Refills: 0 | Status: DISCONTINUED | OUTPATIENT
Start: 2024-11-22 | End: 2024-11-22

## 2024-11-22 RX ORDER — AMLODIPINE BESYLATE 10 MG/1
5 TABLET ORAL DAILY
Refills: 0 | Status: DISCONTINUED | OUTPATIENT
Start: 2024-11-22 | End: 2024-11-22

## 2024-11-22 RX ORDER — HYDRALAZINE HYDROCHLORIDE 10 MG/1
5 TABLET ORAL EVERY 6 HOURS
Refills: 0 | Status: DISCONTINUED | OUTPATIENT
Start: 2024-11-22 | End: 2024-11-22

## 2024-11-22 RX ORDER — HYDRALAZINE HYDROCHLORIDE 10 MG/1
10 TABLET ORAL ONCE
Refills: 0 | Status: COMPLETED | OUTPATIENT
Start: 2024-11-22 | End: 2024-11-22

## 2024-11-22 RX ORDER — FUROSEMIDE 40 MG/1
40 TABLET ORAL
Refills: 0 | Status: DISCONTINUED | OUTPATIENT
Start: 2024-11-22 | End: 2024-11-22

## 2024-11-22 RX ORDER — AMLODIPINE BESYLATE 10 MG/1
1 TABLET ORAL
Qty: 30 | Refills: 0
Start: 2024-11-22 | End: 2024-12-21

## 2024-11-22 RX ADMIN — SEVELAMER CARBONATE 800 MILLIGRAM(S): 800 TABLET, FILM COATED ORAL at 06:27

## 2024-11-22 RX ADMIN — SODIUM CHLORIDE 3 MILLILITER(S): 9 INJECTION, SOLUTION INTRAMUSCULAR; INTRAVENOUS; SUBCUTANEOUS at 04:20

## 2024-11-22 RX ADMIN — Medication 5000 UNIT(S): at 06:27

## 2024-11-22 RX ADMIN — SODIUM CHLORIDE 3 MILLILITER(S): 9 INJECTION, SOLUTION INTRAMUSCULAR; INTRAVENOUS; SUBCUTANEOUS at 00:08

## 2024-11-22 RX ADMIN — BENZOCAINE, MENTHOL 1 LOZENGE: 15; 3.6 LOZENGE ORAL at 06:28

## 2024-11-22 RX ADMIN — Medication 1200 MILLIGRAM(S): at 09:33

## 2024-11-22 NOTE — CHART NOTE - NSCHARTNOTEFT_GEN_A_CORE
Patient left AMA this afternoon. Call to PCP's office was returned after patient left. Told to call [phone] to schedule an appointment. Dr. Yevgeniy Mcdowell's  was gone for the day, so the staff stated she will call him on Monday to arrange an appointment for next week. They said he has been seen in the office monthly for at least the last few months, so is not completely nonadherent to working with Dr. Mcdowell.    Patient was called and confirmed that he will not take his Lasix, since his creatinine level had risen after surgery. He confirmed that he will  the amlodipine at [facility] tomorrow, to start taking. He will also call Dr. Mcdowell's office on Monday, if they don't call him first.    Patient also expressed frustration that his back was aggravated by the bed at the hospital. He felt the pain from his back was much worse than his incisional pain, so knew he couldn't spend one more night in the hospital--like was requested for treatment of his decreasing kidney function. He expressed understanding of the need to follow up with Dr. Mcdowell and needing repeat blood work drawn for his kidney function.

## 2024-11-22 NOTE — PHYSICAL THERAPY INITIAL EVALUATION ADULT - PATIENT/FAMILY/SIGNIFICANT OTHER GOALS STATEMENT, PT EVAL
The pt hopes to go home and states that his wife will help him, he will reportedly sit at the EOB and then pop over/ slide over to the powerchair, and if need be, he has a sliding board at home.

## 2024-11-22 NOTE — PHYSICAL THERAPY INITIAL EVALUATION ADULT - MODALITIES TREATMENT COMMENTS
The pt declined to attempt to sit up at the EOB with PT or to attempt PT evaluation. Will discharge the patient from skilled acute care PT at present. The pt should get OOB and to the chair with family assist as he insists that he is capable of/ or with the use of a total assist lift PRN by the nursing staff. RN aware.

## 2024-11-22 NOTE — PHYSICAL THERAPY INITIAL EVALUATION ADULT - ADDITIONAL COMMENTS
The pt at baseline is non-ambulatory and typically sits at the Edge of his bed and does a transfer over to the power chair/ commode chair using his upper body support. The pt reports that he has not stood on his legs in years. The pt is confident that he will be able to return this level of activity tolerance one he goes home.

## 2024-11-22 NOTE — PHYSICAL THERAPY INITIAL EVALUATION ADULT - PERTINENT HX OF CURRENT PROBLEM, REHAB EVAL
73yo M Pmhx anemia of chronic disease, CKD stage 4/ESRD, HTN, wheelchair bound.  hx DVT RLE 05/2024 2/2 surgical complication- was on eliquis now dc'd,  BPH with LUTS, hx bladder outlet obstruction, hx cystoscopy and suprapubic catheter placement complicated by intraop rectal injury in February 2024 necessitating  a creation of diverting sigmoid loop colostomy. patient is now s/p laparoscopic colostomy reversal with Dr Nicole.       as per colorectal surgery resident: Difficulties with patient care postop, including patient expressing frustration that people don't understand what it is like to take care of a polio victim--that he runs hypertensive in the 160s-180s because of his polio. He refused insulin overnight and threatened to leave AMA if he is put on a diabetic diet. He refused recheck of blood glucose fingerstick overnight. He refused checking BP on both arms, for more accurate readings. This AM patient is resting comfortably.
75yo M Pmhx anemia of chronic disease, CKD stage 4/ESRD, HTN, wheelchair bound.  hx DVT RLE 05/2024 2/2 surgical complication- was on eliquis now dc'd,  BPH with LUTS, hx bladder outlet obstruction, hx cystoscopy and suprapubic catheter placement complicated by intraop rectal injury in February 2024 necessitating  a creation of diverting sigmoid loop colostomy. patient is now s/p laparoscopic colostomy reversal with Dr Nicole.       as per colorectal surgery resident: Difficulties with patient care postop, including patient expressing frustration that people don't understand what it is like to take care of a polio victim--that he runs hypertensive in the 160s-180s because of his polio. He refused insulin overnight and threatened to leave AMA if he is put on a diabetic diet. He refused recheck of blood glucose fingerstick overnight. He refused checking BP on both arms, for more accurate readings. This AM patient is resting comfortably.

## 2024-11-22 NOTE — PHYSICAL THERAPY INITIAL EVALUATION ADULT - GENERAL OBSERVATIONS, REHAB EVAL
The pt was received on 2N, supine, pleasant with his spouse present bedside. The pt declined to attempt to transfer OOB and to his power chair with PT, despite pt education and encouragement. The pt stated that he intends to be discharged home and once he does he will do a sliding transfer OOB and to the power chair/ commode. The patient reports that it has been over a decade since he has ambulated and has not participated with PT in a long time. The pt declined to attempt to sit at the EOB with PT.

## 2024-11-22 NOTE — PROGRESS NOTE ADULT - SUBJECTIVE AND OBJECTIVE BOX
SURGERY DAILY PROGRESS NOTE:     Subjective:  Patient seen and examined by surgical team this morning.   became hypertensive to 180s, refused antihypertensive medications overnight  Otherwise passing gas,   Pain well controlled  Denies fever or chills  Tolerating diet well without nausea or vomiting.    Objective:     PHYSICAL EXAM   GENERAL: NAD, well developed, obese  HEAD: Atraumatic, normocephalic  EYES: EOMI, PERRLA, conjunctiva and sclera clear  ENT: moist mucous membrane  NECK: supple, No JVD, midline trachea  CHEST/LUNG: No increased WOB, symmetric excursions  Heart: RRR ppp, no peripheral edema  ABDOMEN: Round, nondistended, soft, appropriately tender, former stoma dressing w/ thin SS strikethrough, SP tube w/ clear light yellow output in bag  EXTREMITIES: Brisk cap refill. no clubbing or cyanosis  NERVOUS SYSTEM: AOx4, speech clear, anxious  MSK: full ROM, no deformities  SKIN: warm to touch, no rash or lesions         Chief complaint:      PMHx:  Polio    Hypertension    H/O urinary retention    HLD (hyperlipidemia)    BPH with obstruction/lower urinary tract symptoms    Erectile dysfunction    Bladder outlet obstruction    Presence of suprapubic catheter    Colostomy status    DVT, lower extremity    H/O cardiac murmur        PSHx:  S/P colostomy    Suprapubic catheter    S/P ORIF (open reduction internal fixation) fracture    S/P cataract surgery    H/O shoulder surgery        FHx:  No pertinent family history in first degree relatives    FH: type 2 diabetes        Vitals:  T(C): 36.3 ( @ 08:16), Max: 36.8 ( @ 00:00)  HR: 70 ( @ 08:16) (70 - 76)  BP: 175/83 ( @ 08:16) (168/81 - 175/83)  RR: 17 ( @ 08:16) (17 - 17)  SpO2: 97% ( @ 08:16) (97% - 98%)      I&Os     @ 07:01  -   @ 07:00  --------------------------------------------------------  IN:    Oral Fluid: 480 mL  Total IN: 480 mL    OUT:    Indwelling Catheter - Suprapubic (mL): 800 mL  Total OUT: 800 mL    Total NET: -320 mL        .    Labs:   @ 07:49                    9.9  CBC: 6.77>)-------(<182                     30.7                 - | - | -    CMP:  ----------------------< -               - | - | -                      Ca:-  Phos:-  Mg:-               -|      |-        LFTs:  ------|-|-----             -|      |-   @ 08:03                    9.7  CBC: 8.77>)-------(<202                     30.2                 133 | 101 | 83    CMP:  ----------------------< 219               4.1 | 23 | 5.06                      Ca:7.7  Phos:8.4  M.2               -|      |-        LFTs:  ------|-|-----             -|      |-        Cultures:    Culture - Acid Fast - Other w/Smear (collected 24 @ 12:08)  Source: .Other    Culture - Fungal, Other (collected 24 @ 12:08)  Source: .Other  Preliminary Report (24 @ 13:29):    Testing in progress          Current Inpatient Medications:  acetaminophen     Tablet .. 1000 milliGRAM(s) Oral every 6 hours  alvimopan 12 milliGRAM(s) Oral every 12 hours  atorvastatin 40 milliGRAM(s) Oral at bedtime  benzocaine/menthol Lozenge 1 Lozenge Oral every 2 hours PRN  dextrose 5%. 1000 milliLiter(s) (50 mL/Hr) IV Continuous <Continuous>  dextrose 5%. 1000 milliLiter(s) (100 mL/Hr) IV Continuous <Continuous>  dextrose 50% Injectable 25 Gram(s) IV Push once  dextrose 50% Injectable 25 Gram(s) IV Push once  dextrose Oral Gel 15 Gram(s) Oral once PRN  gabapentin 100 milliGRAM(s) Oral three times a day  glucagon  Injectable 1 milliGRAM(s) IntraMuscular once  guaiFENesin ER 1200 milliGRAM(s) Oral every 12 hours  heparin   Injectable 5000 Unit(s) SubCutaneous every 8 hours  insulin lispro (ADMELOG) corrective regimen sliding scale   SubCutaneous three times a day before meals  insulin lispro (ADMELOG) corrective regimen sliding scale   SubCutaneous at bedtime  lactated ringers. 1000 milliLiter(s) (50 mL/Hr) IV Continuous <Continuous>  melatonin 3 milliGRAM(s) Oral at bedtime  oxyCODONE    IR 10 milliGRAM(s) Oral every 4 hours PRN  oxyCODONE    IR 5 milliGRAM(s) Oral every 4 hours PRN  sevelamer carbonate 800 milliGRAM(s) Oral three times a day  sodium chloride 0.9% lock flush 3 milliLiter(s) IV Push every 8 hours

## 2024-11-22 NOTE — PROGRESS NOTE ADULT - ATTENDING COMMENTS
Patient feels well, no N/V, passing flatus, tolerating diet.  Evaluated by nephrology yesterday, recommendations appreciated.  Abdomen soft, benign, packing removed by resident team this morning and new dressing is C/D/I.  Labs stable- per Nephrology note Cr near baseline and patient noncompliant with outpatient Nephrology recommendations.  Noted to have hypertension.  Plan to discuss with Hospitalist but likely plan to discharge today on low fiber diet with plans for close outpatient follow-up with PCP, follow up with me in two weeks.

## 2024-11-22 NOTE — PROGRESS NOTE ADULT - SUBJECTIVE AND OBJECTIVE BOX
CHIEF COMPLAINT: colostomy reversal     HISTORY OF THE PRESENT ILLNESS:  73yo M Pmhx anemia of chronic disease, CKD stage 4/ESRD, HTN, wheelchair bound.  hx DVT RLE 05/2024 2/2 surgical complication- was on eliquis now dc'd,  BPH with LUTS, hx bladder outlet obstruction, hx cystoscopy and suprapubic catheter placement complicated by intraop rectal injury in February 2024 necessitating  a creation of diverting sigmoid loop colostomy. patient is now s/p laparoscopic colostomy reversal with Dr Nicole.     11/22- patient was seen and examined. as per patient he was told by colorectal that he can be discharged today. I explained that he is not medically cleared for dc because his kidney function was worse this morning and is still trending up. I explained that i spoke with Nephrologist,  Dr Lima and his recommendation is to c/w IVF hydration overnight and repeat Kidney function in am to ensure that the kidney function is trending back down to baseline. Patient was very adamant about going home - he stated "I am not going to stay at this useless hospital another day"    REVIEW OF SYSTEMS:   All 10 systems reviewed in detailed and found to be negative with the exception of what has already been described above    Vital Signs Last 24 Hrs  T(C): 36.3 (22 Nov 2024 08:16), Max: 36.8 (22 Nov 2024 00:00)  T(F): 97.3 (22 Nov 2024 08:16), Max: 98.2 (22 Nov 2024 00:00)  HR: 70 (22 Nov 2024 08:16) (70 - 76)  BP: 175/83 (22 Nov 2024 08:16) (168/81 - 175/83)  BP(mean): 106 (22 Nov 2024 00:00) (103 - 106)  RR: 17 (22 Nov 2024 08:16) (17 - 17)  SpO2: 97% (22 Nov 2024 08:16) (97% - 98%)    Parameters below as of 22 Nov 2024 08:16  Patient On (Oxygen Delivery Method): room air        PE:  Constitutional: NAD, laying in bed  HEENT: NC/AT  Back: no tenderness  Respiratory: respirations even and non labored, LCTA- diminished at the base   Cardiovascular: S1S2 regular, no murmurs  Abdomen: soft, not tender, not distended, hypoactive BS   Genitourinary: suprapubic catheter intact   Musculoskeletal: no muscle tenderness, no joint swelling or tenderness  Extremities: no pedal edema   Neurological: no focal deficits      MEDICATIONS  (STANDING):  acetaminophen     Tablet .. 1000 milliGRAM(s) Oral every 6 hours  acetaminophen   IVPB .. 1000 milliGRAM(s) IV Intermittent once  alvimopan 12 milliGRAM(s) Oral every 12 hours  atorvastatin 40 milliGRAM(s) Oral at bedtime  dextrose 5%. 1000 milliLiter(s) (50 mL/Hr) IV Continuous <Continuous>  dextrose 5%. 1000 milliLiter(s) (100 mL/Hr) IV Continuous <Continuous>  dextrose 50% Injectable 25 Gram(s) IV Push once  dextrose 50% Injectable 25 Gram(s) IV Push once  gabapentin 100 milliGRAM(s) Oral three times a day  glucagon  Injectable 1 milliGRAM(s) IntraMuscular once  guaiFENesin ER 1200 milliGRAM(s) Oral every 12 hours  heparin   Injectable 5000 Unit(s) SubCutaneous every 8 hours  insulin lispro (ADMELOG) corrective regimen sliding scale   SubCutaneous three times a day before meals  insulin lispro (ADMELOG) corrective regimen sliding scale   SubCutaneous at bedtime  lactated ringers. 1000 milliLiter(s) (50 mL/Hr) IV Continuous <Continuous>  melatonin 3 milliGRAM(s) Oral at bedtime  sevelamer carbonate 800 milliGRAM(s) Oral three times a day  sodium chloride 0.9% lock flush 3 milliLiter(s) IV Push every 8 hours    MEDICATIONS  (PRN):  benzocaine/menthol Lozenge 1 Lozenge Oral every 2 hours PRN Sore Throat  dextrose Oral Gel 15 Gram(s) Oral once PRN Blood Glucose LESS THAN 70 milliGRAM(s)/deciliter  oxyCODONE    IR 10 milliGRAM(s) Oral every 4 hours PRN breakthrough pain  oxyCODONE    IR 5 milliGRAM(s) Oral every 4 hours PRN Severe Pain (7 - 10)    LABS:    11-22    134[L]  |  102  |  81[H]  ----------------------------<  119[H]  3.6   |  21[L]  |  5.21[H]    Ca    7.6[L]      22 Nov 2024 07:49  Phos  8.2     11-22  Mg     2.1     11-22    TPro  6.7  /  Alb  2.7[L]  /  TBili  0.5  /  DBili  x   /  AST  10[L]  /  ALT  8[L]  /  AlkPhos  82  11-22                          9.9    6.77  )-----------( 182      ( 22 Nov 2024 07:49 )             30.7           Creatinine: 5.21 mg/dL (11.22.24 @ 07:49)   Creatinine: 5.06 mg/dL (11.21.24 @ 08:03)   Creatinine: 4.19 mg/dL (10.09.24 @ 13:30)   Creatinine: 4.10 mg/dL (09.30.24 @ 08:32)   Creatinine: 3.30 mg/dL (06.26.24 @ 09:41)   Creatinine: 3.06 mg/dL (05.13.24 @ 07:22)   Creatinine: 2.90 mg/dL (04.08.24 @ 08:29)   Creatinine: 3.55 mg/dL (03.04.24 @ 16:03)   Creatinine: 3.49 mg/dL (02.21.24 @ 07:30)   Creatinine: 3.45 mg/dL (02.20.24 @ 07:00)

## 2024-11-22 NOTE — PROGRESS NOTE ADULT - ASSESSMENT
73 yo M w/ DM, HTN, CKD, PTSD after iatrogenic injury to rectum requiring diverting loop colostomy, now POD2 s/p lap-assisted colostomy reversal. Recovering as expected, awaiting return of bowel function    Plan  -Continue low residue diet  -Remove packing today  -Encourage OOB/ambulation/PT eval  -HL IV w/ adequate PO  -Multimodal pain control  -VTE ppx w/ HSQ  -Urology c/s for SPT  -Hospitalist c/s  -Nephro consult for increasing Cr  -poss DC today    Will discuss w/ CRS team & update accordingly

## 2024-11-22 NOTE — PHYSICAL THERAPY INITIAL EVALUATION ADULT - DIAGNOSIS, PT EVAL
74 y.o. male POD 1 s/p lap-assisted colostomy reversal by Dr. Nicole
74 y.o. male POD 1 s/p lap-assisted colostomy reversal by Dr. Nicole

## 2024-11-22 NOTE — PROGRESS NOTE ADULT - REASON FOR ADMISSION
s/p colostomy reversal
elective colostomy reversal
elective colostomy reversal
s/p colostomy reversal

## 2024-11-22 NOTE — PROGRESS NOTE ADULT - ASSESSMENT
73yo M Pmhx anemia of chronic disease, CKD stage 4/ESRD, HTN, wheelchair bound.  hx DVT RLE 05/2024 2/2 surgical complication- was on eliquis now dc'd,  BPH with LUTS, hx bladder outlet obstruction, hx cystoscopy and suprapubic catheter placement complicated by intraop rectal injury in February 2024 necessitating  a creation of diverting sigmoid loop colostomy. patient is now s/p laparoscopic colostomy reversal with Dr Nicole.    *s/p laparoscopic colostomy reversal   -  pain control  -  incentive spirometry  -  OOB to chair  -  IVFs  -  diet per colorectal service  -  wound care per colorectal  -  Entereg  -  strict Is/Os  -  check labs in am  -  complete IV perioperative ABXs  -  encourage IS and ambulation  -  Antiemetics PRN   -  awaiting return of bowel function     *cough-   - mucinex and cepacol  - encourage IS - afebrile   - check CXRAY    *anemia- likely AOCD vs NATHANIEL (previous workup in 2/2024)  - monitor    *HLD- lipitor     *HTN- monitor BP   - hydralazine PRN  - start Norvasc     *CKD/ESRD  - SP catheter intact- draining yellow colored urine   - monitor creatinine   - hold lasix for SONDRA  - Creatinine 5.06-->5.21  - renally dose meds  - 11/22 d/w Dr Lima- he is recommending to continue IVF overnight and recheck Creatinine  in am to ensure that it is trending down    DVT prophylaxis- venodynes    ***Patient very adamant about leaving- stated "I cannot stay at this useless hospital another night." Patient is AOx4- patient is verbally aggressive and refused to follow medical recommendations. I explained that I spoke with Dr Lima - nephrologist and relayed his recommendations- patient refused to listen. patient has adequate capacity to make medical decisions. I explained that I cannot medically clear him for discharge and he ill need to sign out Against medical advice, Risks of signing out AMA, which includes permanent disability and worsening of symptoms, worsening kidney function which could potentially lead to permanent disability and death.   The benefits of staying inpatient has been explained,  including availability and proximity of nurses, MD's, monitoring of diagnostic testing and treatment. The patient was able to understand the risk and benefits of staying in the hospital and accept the risk. This was witnessed by bedside RN, undersigned and . He was given opportunity to ask questions about his medical condition. The patient was treated to the extent that they would allow and knows that he may return for care at any time.

## 2024-11-23 LAB
-  AMIKACIN: SIGNIFICANT CHANGE UP
-  AZTREONAM: SIGNIFICANT CHANGE UP
-  CEFEPIME: SIGNIFICANT CHANGE UP
-  CEFTAZIDIME: SIGNIFICANT CHANGE UP
-  CIPROFLOXACIN: SIGNIFICANT CHANGE UP
-  IMIPENEM: SIGNIFICANT CHANGE UP
-  LEVOFLOXACIN: SIGNIFICANT CHANGE UP
-  MEROPENEM: SIGNIFICANT CHANGE UP
-  PIPERACILLIN/TAZOBACTAM: SIGNIFICANT CHANGE UP
BLANDM BLD POS QL PROBE: SIGNIFICANT CHANGE UP
CULTURE RESULTS: ABNORMAL
METHOD TYPE: SIGNIFICANT CHANGE UP
METHOD TYPE: SIGNIFICANT CHANGE UP
ORGANISM # SPEC MICROSCOPIC CNT: ABNORMAL
ORGANISM # SPEC MICROSCOPIC CNT: ABNORMAL
ORGANISM # SPEC MICROSCOPIC CNT: SIGNIFICANT CHANGE UP
SPECIMEN SOURCE: SIGNIFICANT CHANGE UP

## 2024-11-26 ENCOUNTER — NON-APPOINTMENT (OUTPATIENT)
Age: 74
End: 2024-11-26

## 2024-11-26 LAB — SURGICAL PATHOLOGY STUDY: SIGNIFICANT CHANGE UP

## 2024-11-27 DIAGNOSIS — D63.1 ANEMIA IN CHRONIC KIDNEY DISEASE: ICD-10-CM

## 2024-11-27 DIAGNOSIS — N17.9 ACUTE KIDNEY FAILURE, UNSPECIFIED: ICD-10-CM

## 2024-11-27 DIAGNOSIS — N40.1 BENIGN PROSTATIC HYPERPLASIA WITH LOWER URINARY TRACT SYMPTOMS: ICD-10-CM

## 2024-11-27 DIAGNOSIS — E78.5 HYPERLIPIDEMIA, UNSPECIFIED: ICD-10-CM

## 2024-11-27 DIAGNOSIS — Z43.3 ENCOUNTER FOR ATTENTION TO COLOSTOMY: ICD-10-CM

## 2024-11-27 DIAGNOSIS — Z99.3 DEPENDENCE ON WHEELCHAIR: ICD-10-CM

## 2024-11-27 DIAGNOSIS — Z53.29 PROCEDURE AND TREATMENT NOT CARRIED OUT BECAUSE OF PATIENT'S DECISION FOR OTHER REASONS: ICD-10-CM

## 2024-11-27 DIAGNOSIS — Z79.84 LONG TERM (CURRENT) USE OF ORAL HYPOGLYCEMIC DRUGS: ICD-10-CM

## 2024-11-27 DIAGNOSIS — E11.22 TYPE 2 DIABETES MELLITUS WITH DIABETIC CHRONIC KIDNEY DISEASE: ICD-10-CM

## 2024-11-27 DIAGNOSIS — D63.8 ANEMIA IN OTHER CHRONIC DISEASES CLASSIFIED ELSEWHERE: ICD-10-CM

## 2024-11-27 DIAGNOSIS — N18.6 END STAGE RENAL DISEASE: ICD-10-CM

## 2024-11-27 DIAGNOSIS — Z99.2 DEPENDENCE ON RENAL DIALYSIS: ICD-10-CM

## 2024-11-27 DIAGNOSIS — Z96.0 PRESENCE OF UROGENITAL IMPLANTS: ICD-10-CM

## 2024-11-27 DIAGNOSIS — I12.0 HYPERTENSIVE CHRONIC KIDNEY DISEASE WITH STAGE 5 CHRONIC KIDNEY DISEASE OR END STAGE RENAL DISEASE: ICD-10-CM

## 2024-11-27 DIAGNOSIS — R05.9 COUGH, UNSPECIFIED: ICD-10-CM

## 2024-12-10 DIAGNOSIS — R33.9 RETENTION OF URINE, UNSPECIFIED: ICD-10-CM

## 2024-12-13 RX ORDER — GABAPENTIN 300 MG/1
1 CAPSULE ORAL
Refills: 0 | DISCHARGE

## 2024-12-13 RX ORDER — SEVELAMER CARBONATE 800 MG/1
1 TABLET, FILM COATED ORAL
Refills: 0 | DISCHARGE

## 2024-12-13 RX ORDER — EMPAGLIFLOZIN 25 MG/1
1 TABLET, FILM COATED ORAL
Refills: 0 | DISCHARGE

## 2024-12-13 RX ORDER — DICLOFENAC SODIUM 20 MG/G
2 SOLUTION TOPICAL
Refills: 0 | DISCHARGE

## 2024-12-13 RX ORDER — SODIUM BICARBONATE 84 MG/ML
650 INJECTION, SOLUTION INTRAVENOUS
Refills: 0 | DISCHARGE

## 2024-12-13 NOTE — ASU PATIENT PROFILE, ADULT - PAIN SCALE PREFERRED, PROFILE
Pt here for C2D1  Rituxan/Bendeka      Arrives Ambulating independently, accompanied by family member. Pregnancy screening: Denies possibility of pregnancy    Modifications in dose or schedule: No     Frequency of blood return and site check throughout administration: Prior to administration, Prior to each drug and At completion of therapy     Patient denies and new signs/symptoms after 1st cycle. Having slight constipation - using PO stool softeners and has milk of magnesia as needed. Ok per protocol to run Rituxan at rapid rate today. Patient tolerated infusion without complications. Port remains access for Day 2  Per patient request  port saline and hep locked     Discharged to Home, Ambulating independently, accompanied by: family member. Outpatient Oncology Care Plan  Problem list:  anemia  loss of appetite  neutropenia  fatigue  nausea and vomiting  nutrition  Problems related to:    chemotherapy  Interventions:  educate hygiene/handwashing  monitor for signs/symptoms of infection  chemotherapy teaching  encourage activity as tolerated  maintain skin integrity  Expected outcomes:  able to maintain oral integrity  adequate sleep/rest  family support/coping well  free of infection  maintains/gains weight  optimal lab values  patient supported/coping well  symptoms relieved/minimized  understands plan of care  verbalize how to care for self  Progress towards outcome:  unchanged    Education Record    Learner:  Patient  Barriers / Limitations:  None  Method:  Discussion, Printed material and Reinforcement  Outcome:  Verbalized understanding  Comments: discussed constipation management   discuss medication profile, possible SE , ways to prevent and manage them     Provided AVS for appointments for tomorrow and future cycles. numerical 0-10

## 2024-12-13 NOTE — ASU PATIENT PROFILE, ADULT - NSICDXPASTMEDICALHX_GEN_ALL_CORE_FT
PAST MEDICAL HISTORY:  Bladder outlet obstruction     BPH with obstruction/lower urinary tract symptoms     Colostomy status     DVT, lower extremity     Erectile dysfunction     H/O cardiac murmur     H/O urinary retention suprapubic tube    HLD (hyperlipidemia)     Hypertension     Polio     Presence of suprapubic catheter

## 2024-12-13 NOTE — ASU PATIENT PROFILE, ADULT - FALL HARM RISK - HARM RISK INTERVENTIONS

## 2024-12-16 ENCOUNTER — TRANSCRIPTION ENCOUNTER (OUTPATIENT)
Age: 74
End: 2024-12-16

## 2024-12-16 ENCOUNTER — OUTPATIENT (OUTPATIENT)
Dept: INPATIENT UNIT | Facility: HOSPITAL | Age: 74
LOS: 1 days | Discharge: HOME | End: 2024-12-16
Payer: MEDICARE

## 2024-12-16 ENCOUNTER — RESULT REVIEW (OUTPATIENT)
Age: 74
End: 2024-12-16

## 2024-12-16 VITALS
OXYGEN SATURATION: 100 % | HEIGHT: 68 IN | DIASTOLIC BLOOD PRESSURE: 81 MMHG | SYSTOLIC BLOOD PRESSURE: 164 MMHG | WEIGHT: 199.96 LBS | RESPIRATION RATE: 16 BRPM | HEART RATE: 88 BPM | TEMPERATURE: 97 F

## 2024-12-16 VITALS
SYSTOLIC BLOOD PRESSURE: 150 MMHG | DIASTOLIC BLOOD PRESSURE: 60 MMHG | OXYGEN SATURATION: 100 % | TEMPERATURE: 98 F | RESPIRATION RATE: 16 BRPM | HEART RATE: 82 BPM

## 2024-12-16 DIAGNOSIS — T83.84XA PAIN DUE TO GENITOURINARY PROSTHETIC DEVICES, IMPLANTS AND GRAFTS, INITIAL ENCOUNTER: ICD-10-CM

## 2024-12-16 DIAGNOSIS — Z93.59 OTHER CYSTOSTOMY STATUS: Chronic | ICD-10-CM

## 2024-12-16 DIAGNOSIS — Z93.3 COLOSTOMY STATUS: Chronic | ICD-10-CM
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
## 2024-12-16 DIAGNOSIS — R33.9 RETENTION OF URINE, UNSPECIFIED: ICD-10-CM

## 2024-12-16 DIAGNOSIS — Z98.890 OTHER SPECIFIED POSTPROCEDURAL STATES: Chronic | ICD-10-CM
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
## 2024-12-16 DIAGNOSIS — Z98.49 CATARACT EXTRACTION STATUS, UNSPECIFIED EYE: Chronic | ICD-10-CM

## 2024-12-16 LAB
ANION GAP SERPL CALC-SCNC: 9 MMOL/L — SIGNIFICANT CHANGE UP (ref 5–17)
BUN SERPL-MCNC: 122 MG/DL — HIGH (ref 7–23)
CALCIUM SERPL-MCNC: 8.7 MG/DL — SIGNIFICANT CHANGE UP (ref 8.5–10.1)
CHLORIDE SERPL-SCNC: 100 MMOL/L — SIGNIFICANT CHANGE UP (ref 96–108)
CO2 SERPL-SCNC: 20 MMOL/L — LOW (ref 22–31)
CREAT SERPL-MCNC: 7.75 MG/DL — HIGH (ref 0.5–1.3)
EGFR: 7 ML/MIN/1.73M2 — LOW
GLUCOSE SERPL-MCNC: 138 MG/DL — HIGH (ref 70–99)
HCT VFR BLD CALC: 34.4 % — LOW (ref 39–50)
HGB BLD-MCNC: 11.3 G/DL — LOW (ref 13–17)
INR BLD: 1.09 RATIO — SIGNIFICANT CHANGE UP (ref 0.85–1.16)
MCHC RBC-ENTMCNC: 28.6 PG — SIGNIFICANT CHANGE UP (ref 27–34)
MCHC RBC-ENTMCNC: 32.8 G/DL — SIGNIFICANT CHANGE UP (ref 32–36)
MCV RBC AUTO: 87.1 FL — SIGNIFICANT CHANGE UP (ref 80–100)
PLATELET # BLD AUTO: 258 K/UL — SIGNIFICANT CHANGE UP (ref 150–400)
POTASSIUM SERPL-MCNC: 5.4 MMOL/L — HIGH (ref 3.5–5.3)
POTASSIUM SERPL-SCNC: 5.4 MMOL/L — HIGH (ref 3.5–5.3)
PROTHROM AB SERPL-ACNC: 12.8 SEC — SIGNIFICANT CHANGE UP (ref 9.9–13.4)
RBC # BLD: 3.95 M/UL — LOW (ref 4.2–5.8)
RBC # FLD: 13.5 % — SIGNIFICANT CHANGE UP (ref 10.3–14.5)
SODIUM SERPL-SCNC: 129 MMOL/L — LOW (ref 135–145)
WBC # BLD: 6.42 K/UL — SIGNIFICANT CHANGE UP (ref 3.8–10.5)
WBC # FLD AUTO: 6.42 K/UL — SIGNIFICANT CHANGE UP (ref 3.8–10.5)

## 2024-12-16 PROCEDURE — 36415 COLL VENOUS BLD VENIPUNCTURE: CPT

## 2024-12-16 PROCEDURE — C1769: CPT

## 2024-12-16 PROCEDURE — 75984 XRAY CONTROL CATHETER CHANGE: CPT

## 2024-12-16 PROCEDURE — C1729: CPT

## 2024-12-16 PROCEDURE — 85027 COMPLETE CBC AUTOMATED: CPT

## 2024-12-16 PROCEDURE — 51705 CHANGE OF BLADDER TUBE: CPT

## 2024-12-16 PROCEDURE — 75984 XRAY CONTROL CATHETER CHANGE: CPT | Mod: 26

## 2024-12-16 PROCEDURE — 80048 BASIC METABOLIC PNL TOTAL CA: CPT

## 2024-12-16 PROCEDURE — 85610 PROTHROMBIN TIME: CPT

## 2024-12-16 RX ORDER — ACETAMINOPHEN 500MG 500 MG/1
1000 TABLET, COATED ORAL ONCE
Refills: 0 | Status: COMPLETED | OUTPATIENT
Start: 2024-12-16 | End: 2024-12-16

## 2024-12-16 RX ADMIN — ACETAMINOPHEN 500MG 400 MILLIGRAM(S): 500 TABLET, COATED ORAL at 13:11

## 2024-12-16 NOTE — ASU DISCHARGE PLAN (ADULT/PEDIATRIC) - FINANCIAL ASSISTANCE
[facility] provides services at a reduced cost to those who are determined to be eligible through [facility]’s financial assistance program. Information regarding [facility]’s financial assistance program can be found by going to [email or web address] or by calling [phone].

## 2024-12-16 NOTE — ASU DISCHARGE PLAN (ADULT/PEDIATRIC) - NS MD DC FALL RISK RISK
For information on Fall & Injury Prevention, visit: [email or web address] OR  [email or web address] OR  https://www.cdc.gov/steadi/patient.html

## 2024-12-20 ENCOUNTER — INPATIENT (INPATIENT)
Facility: HOSPITAL | Age: 74
LOS: 4 days | Discharge: HOME | DRG: 948 | End: 2024-12-25
Attending: INTERNAL MEDICINE | Admitting: INTERNAL MEDICINE
Payer: MEDICARE

## 2024-12-20 VITALS
SYSTOLIC BLOOD PRESSURE: 165 MMHG | TEMPERATURE: 98 F | RESPIRATION RATE: 20 BRPM | HEIGHT: 68 IN | DIASTOLIC BLOOD PRESSURE: 96 MMHG | OXYGEN SATURATION: 98 % | WEIGHT: 207.9 LBS | HEART RATE: 93 BPM

## 2024-12-20 DIAGNOSIS — R79.89 OTHER SPECIFIED ABNORMAL FINDINGS OF BLOOD CHEMISTRY: ICD-10-CM

## 2024-12-20 DIAGNOSIS — Z98.890 OTHER SPECIFIED POSTPROCEDURAL STATES: Chronic | ICD-10-CM

## 2024-12-20 DIAGNOSIS — Z98.49 CATARACT EXTRACTION STATUS, UNSPECIFIED EYE: Chronic | ICD-10-CM

## 2024-12-20 DIAGNOSIS — Z93.3 COLOSTOMY STATUS: Chronic | ICD-10-CM

## 2024-12-20 DIAGNOSIS — H92.02 OTALGIA, LEFT EAR: ICD-10-CM

## 2024-12-20 DIAGNOSIS — Z93.59 OTHER CYSTOSTOMY STATUS: Chronic | ICD-10-CM

## 2024-12-20 LAB
ALBUMIN SERPL ELPH-MCNC: 3.8 G/DL — SIGNIFICANT CHANGE UP (ref 3.3–5)
ALP SERPL-CCNC: 108 U/L — SIGNIFICANT CHANGE UP (ref 40–120)
ALT FLD-CCNC: 9 U/L — LOW (ref 10–45)
ANION GAP SERPL CALC-SCNC: 18 MMOL/L — HIGH (ref 5–17)
ANION GAP SERPL CALC-SCNC: 19 MMOL/L — HIGH (ref 5–17)
AST SERPL-CCNC: 12 U/L — SIGNIFICANT CHANGE UP (ref 10–40)
BASOPHILS # BLD AUTO: 0.05 K/UL — SIGNIFICANT CHANGE UP (ref 0–0.2)
BASOPHILS NFR BLD AUTO: 0.5 % — SIGNIFICANT CHANGE UP (ref 0–2)
BILIRUB SERPL-MCNC: 0.3 MG/DL — SIGNIFICANT CHANGE UP (ref 0.2–1.2)
BUN SERPL-MCNC: 100 MG/DL — HIGH (ref 7–23)
BUN SERPL-MCNC: 105 MG/DL — HIGH (ref 7–23)
CALCIUM SERPL-MCNC: 8 MG/DL — LOW (ref 8.4–10.5)
CALCIUM SERPL-MCNC: 8.4 MG/DL — SIGNIFICANT CHANGE UP (ref 8.4–10.5)
CHLORIDE SERPL-SCNC: 94 MMOL/L — LOW (ref 96–108)
CHLORIDE SERPL-SCNC: 98 MMOL/L — SIGNIFICANT CHANGE UP (ref 96–108)
CO2 SERPL-SCNC: 15 MMOL/L — LOW (ref 22–31)
CO2 SERPL-SCNC: 17 MMOL/L — LOW (ref 22–31)
CREAT SERPL-MCNC: 7.38 MG/DL — HIGH (ref 0.5–1.3)
CREAT SERPL-MCNC: 7.51 MG/DL — HIGH (ref 0.5–1.3)
EGFR: 7 ML/MIN/1.73M2 — LOW
EGFR: 7 ML/MIN/1.73M2 — LOW
EOSINOPHIL # BLD AUTO: 0.25 K/UL — SIGNIFICANT CHANGE UP (ref 0–0.5)
EOSINOPHIL NFR BLD AUTO: 2.7 % — SIGNIFICANT CHANGE UP (ref 0–6)
GAS PNL BLDV: SIGNIFICANT CHANGE UP
GLUCOSE BLDC GLUCOMTR-MCNC: 127 MG/DL — HIGH (ref 70–99)
GLUCOSE BLDC GLUCOMTR-MCNC: 133 MG/DL — HIGH (ref 70–99)
GLUCOSE BLDC GLUCOMTR-MCNC: 151 MG/DL — HIGH (ref 70–99)
GLUCOSE SERPL-MCNC: 122 MG/DL — HIGH (ref 70–99)
GLUCOSE SERPL-MCNC: 128 MG/DL — HIGH (ref 70–99)
HCT VFR BLD CALC: 38.3 % — LOW (ref 39–50)
HGB BLD-MCNC: 12.3 G/DL — LOW (ref 13–17)
IMM GRANULOCYTES NFR BLD AUTO: 0.5 % — SIGNIFICANT CHANGE UP (ref 0–0.9)
LYMPHOCYTES # BLD AUTO: 1.14 K/UL — SIGNIFICANT CHANGE UP (ref 1–3.3)
LYMPHOCYTES # BLD AUTO: 12.5 % — LOW (ref 13–44)
MCHC RBC-ENTMCNC: 28.3 PG — SIGNIFICANT CHANGE UP (ref 27–34)
MCHC RBC-ENTMCNC: 32.1 G/DL — SIGNIFICANT CHANGE UP (ref 32–36)
MCV RBC AUTO: 88.2 FL — SIGNIFICANT CHANGE UP (ref 80–100)
MONOCYTES # BLD AUTO: 1.01 K/UL — HIGH (ref 0–0.9)
MONOCYTES NFR BLD AUTO: 11.1 % — SIGNIFICANT CHANGE UP (ref 2–14)
NEUTROPHILS # BLD AUTO: 6.63 K/UL — SIGNIFICANT CHANGE UP (ref 1.8–7.4)
NEUTROPHILS NFR BLD AUTO: 72.7 % — SIGNIFICANT CHANGE UP (ref 43–77)
NRBC # BLD: 0 /100 WBCS — SIGNIFICANT CHANGE UP (ref 0–0)
PLATELET # BLD AUTO: 276 K/UL — SIGNIFICANT CHANGE UP (ref 150–400)
POTASSIUM SERPL-MCNC: 6.3 MMOL/L — CRITICAL HIGH (ref 3.5–5.3)
POTASSIUM SERPL-MCNC: 7.5 MMOL/L — CRITICAL HIGH (ref 3.5–5.3)
POTASSIUM SERPL-SCNC: 6.3 MMOL/L — CRITICAL HIGH (ref 3.5–5.3)
POTASSIUM SERPL-SCNC: 7.5 MMOL/L — CRITICAL HIGH (ref 3.5–5.3)
PROT SERPL-MCNC: 7.6 G/DL — SIGNIFICANT CHANGE UP (ref 6–8.3)
RBC # BLD: 4.34 M/UL — SIGNIFICANT CHANGE UP (ref 4.2–5.8)
RBC # FLD: 13.6 % — SIGNIFICANT CHANGE UP (ref 10.3–14.5)
SODIUM SERPL-SCNC: 129 MMOL/L — LOW (ref 135–145)
SODIUM SERPL-SCNC: 132 MMOL/L — LOW (ref 135–145)
WBC # BLD: 9.13 K/UL — SIGNIFICANT CHANGE UP (ref 3.8–10.5)
WBC # FLD AUTO: 9.13 K/UL — SIGNIFICANT CHANGE UP (ref 3.8–10.5)

## 2024-12-20 PROCEDURE — 71045 X-RAY EXAM CHEST 1 VIEW: CPT | Mod: 26

## 2024-12-20 PROCEDURE — 99291 CRITICAL CARE FIRST HOUR: CPT | Mod: GC

## 2024-12-20 PROCEDURE — 99283 EMERGENCY DEPT VISIT LOW MDM: CPT | Mod: FS

## 2024-12-20 RX ORDER — SODIUM BICARBONATE 84 MG/ML
0.06 INJECTION, SOLUTION INTRAVENOUS
Qty: 75 | Refills: 0 | Status: DISCONTINUED | OUTPATIENT
Start: 2024-12-20 | End: 2024-12-21

## 2024-12-20 RX ORDER — ACETAMINOPHEN 80 MG/.8ML
650 SOLUTION/ DROPS ORAL EVERY 6 HOURS
Refills: 0 | Status: DISCONTINUED | OUTPATIENT
Start: 2024-12-20 | End: 2024-12-25

## 2024-12-20 RX ORDER — ATORVASTATIN CALCIUM 40 MG/1
40 TABLET, FILM COATED ORAL AT BEDTIME
Refills: 0 | Status: DISCONTINUED | OUTPATIENT
Start: 2024-12-20 | End: 2024-12-25

## 2024-12-20 RX ORDER — ONDANSETRON 4 MG/1
4 TABLET ORAL ONCE
Refills: 0 | Status: COMPLETED | OUTPATIENT
Start: 2024-12-20 | End: 2024-12-20

## 2024-12-20 RX ORDER — DEXTROSE MONOHYDRATE 25 G/50ML
50 INJECTION, SOLUTION INTRAVENOUS ONCE
Refills: 0 | Status: COMPLETED | OUTPATIENT
Start: 2024-12-20 | End: 2024-12-20

## 2024-12-20 RX ORDER — OXYCODONE HCL 15 MG
10 TABLET ORAL EVERY 6 HOURS
Refills: 0 | Status: DISCONTINUED | OUTPATIENT
Start: 2024-12-20 | End: 2024-12-25

## 2024-12-20 RX ORDER — SODIUM ZIRCONIUM CYCLOSILICATE 10 G/10G
5 POWDER, FOR SUSPENSION ORAL ONCE
Refills: 0 | Status: COMPLETED | OUTPATIENT
Start: 2024-12-20 | End: 2024-12-20

## 2024-12-20 RX ORDER — INSULIN HUMAN 100 [IU]/ML
5 INJECTION, SOLUTION SUBCUTANEOUS ONCE
Refills: 0 | Status: COMPLETED | OUTPATIENT
Start: 2024-12-20 | End: 2024-12-20

## 2024-12-20 RX ORDER — PANTOPRAZOLE 40 MG/1
40 TABLET, DELAYED RELEASE ORAL
Refills: 0 | Status: DISCONTINUED | OUTPATIENT
Start: 2024-12-20 | End: 2024-12-25

## 2024-12-20 RX ORDER — ZOLPIDEM TARTRATE 5 MG
5 TABLET ORAL AT BEDTIME
Refills: 0 | Status: DISCONTINUED | OUTPATIENT
Start: 2024-12-20 | End: 2024-12-25

## 2024-12-20 RX ORDER — ZOLPIDEM TARTRATE 5 MG
1 TABLET ORAL
Refills: 0 | DISCHARGE

## 2024-12-20 RX ORDER — ACETAMINOPHEN 80 MG/.8ML
1000 SOLUTION/ DROPS ORAL ONCE
Refills: 0 | Status: COMPLETED | OUTPATIENT
Start: 2024-12-20 | End: 2024-12-20

## 2024-12-20 RX ORDER — OXYCODONE HCL 15 MG
1 TABLET ORAL
Refills: 0 | DISCHARGE

## 2024-12-20 RX ORDER — ONDANSETRON 4 MG/1
4 TABLET ORAL EVERY 6 HOURS
Refills: 0 | Status: DISCONTINUED | OUTPATIENT
Start: 2024-12-20 | End: 2024-12-25

## 2024-12-20 RX ORDER — HEPARIN SODIUM 1000 [USP'U]/ML
5000 INJECTION, SOLUTION INTRAVENOUS; SUBCUTANEOUS EVERY 12 HOURS
Refills: 0 | Status: DISCONTINUED | OUTPATIENT
Start: 2024-12-20 | End: 2024-12-25

## 2024-12-20 RX ADMIN — SODIUM BICARBONATE 75 MEQ/KG/HR: 84 INJECTION, SOLUTION INTRAVENOUS at 20:01

## 2024-12-20 RX ADMIN — Medication 5 MILLIGRAM(S): at 22:01

## 2024-12-20 RX ADMIN — HEPARIN SODIUM 5000 UNIT(S): 1000 INJECTION, SOLUTION INTRAVENOUS; SUBCUTANEOUS at 18:32

## 2024-12-20 RX ADMIN — INSULIN HUMAN 5 UNIT(S): 100 INJECTION, SOLUTION SUBCUTANEOUS at 21:25

## 2024-12-20 RX ADMIN — Medication 10 MILLIGRAM(S): at 19:00

## 2024-12-20 RX ADMIN — Medication 10 MILLIGRAM(S): at 18:32

## 2024-12-20 RX ADMIN — ACETAMINOPHEN 1000 MILLIGRAM(S): 80 SOLUTION/ DROPS ORAL at 18:38

## 2024-12-20 RX ADMIN — ATORVASTATIN CALCIUM 40 MILLIGRAM(S): 40 TABLET, FILM COATED ORAL at 23:11

## 2024-12-20 RX ADMIN — INSULIN HUMAN 5 UNIT(S): 100 INJECTION, SOLUTION SUBCUTANEOUS at 16:29

## 2024-12-20 RX ADMIN — SODIUM ZIRCONIUM CYCLOSILICATE 5 GRAM(S): 10 POWDER, FOR SUSPENSION ORAL at 16:31

## 2024-12-20 RX ADMIN — DEXTROSE MONOHYDRATE 50 MILLILITER(S): 25 INJECTION, SOLUTION INTRAVENOUS at 21:25

## 2024-12-20 RX ADMIN — ONDANSETRON 4 MILLIGRAM(S): 4 TABLET ORAL at 15:02

## 2024-12-20 RX ADMIN — DEXTROSE MONOHYDRATE 50 MILLILITER(S): 25 INJECTION, SOLUTION INTRAVENOUS at 16:31

## 2024-12-20 RX ADMIN — ACETAMINOPHEN 1000 MILLIGRAM(S): 80 SOLUTION/ DROPS ORAL at 15:45

## 2024-12-20 RX ADMIN — ACETAMINOPHEN 650 MILLIGRAM(S): 80 SOLUTION/ DROPS ORAL at 22:06

## 2024-12-20 RX ADMIN — ACETAMINOPHEN 400 MILLIGRAM(S): 80 SOLUTION/ DROPS ORAL at 15:07

## 2024-12-20 RX ADMIN — SODIUM ZIRCONIUM CYCLOSILICATE 5 GRAM(S): 10 POWDER, FOR SUSPENSION ORAL at 21:31

## 2024-12-20 NOTE — ED PROVIDER NOTE - CLINICAL SUMMARY MEDICAL DECISION MAKING FREE TEXT BOX
74-year-old man with history of hypertension, polio, suprapubic catheter for the past year presents to the emergency department with abnormal blood work.  Patient was recently seen in his PCP and was found to have elevated creatinine to 7.  Patient also endorses urinary retention and abnormal urine color.  Patient denies chest pain, shortness of breath, or lower extremity swelling.  Patient also endorsing lower back pain.  Patient afebrile and HD stable.  Mild suprapubic tenderness to palpation and yellow-green frothy urine in suprapubic bag.  DDx includes SONDRA, UTI, pyelonephritis.  Will reach out to nephrologist Dr. Eller: And house urology team.  Dispo pending labs, imaging and reassessment

## 2024-12-20 NOTE — ED PROVIDER NOTE - OBJECTIVE STATEMENT
see mdm see mdm    Attendinyo male with suprapubic catheter presents with elevated potassium on outpatient labs.  pt had catheter replaced a few days ago and has been having increasing creatinine since then.

## 2024-12-20 NOTE — ED PROVIDER NOTE - PHYSICAL EXAMINATION
Const: Awake, alert, no acute distress.  Well appearing.  Moving comfortably on stretcher.  Cardiac: Regular rate and regular rhythm. S1 S2 present.  Peripheral pulses 2+ and symmetric. No LE edema.  Resp: Speaking in full sentences. No evidence of respiratory distress.  Breath sounds clear to auscultation b/l. Normal chest excursion.   Abd: Non-distended, no overlying skin changes.  Soft, suprapubic ttp, no guarding, no rigidity, no rebound tenderness.  No palpable masses.  Normal bowel sounds in all 4 quadrants.  Skin: Normal coloration.  No rashes, abrasions or lacerations.  Neuro: Awake, alert & oriented x 3.  Moves all extremities spontaneously.  No focal deficits.

## 2024-12-20 NOTE — CONSULT NOTE ADULT - PROBLEM SELECTOR RECOMMENDATION 9
- rec acetic acid otic drops, 3-5 drops into left ear canal every 6 Hours for 1 week  - f/up with ear fungal and bacterial cultures (obtained and sent)   - Please follow up outpatient with [facility] clinic by calling [phone]. Add: [address], [place] [address]  - case reviewed and discussed with attending Dr Alvarez

## 2024-12-20 NOTE — ED ADULT NURSE NOTE - NS ED NURSE RECORD ANOTHER HT AND WT
Occupational Therapy Evaluation      Visit Count: 1  Plan of Care Dates: Initial: 1/10/2018 Through: 4/4/2018    Insurance Information: [employer]  [facility] Referring Provider Visit: 2/1/18    Referred by: Dayton Renteria MD  Medical Diagnosis (from order):  Adhesive capsulitis of right shoulder [M75.01]  Strain of right rotator cuff capsule, initial encounter [S46.011A]  Treatment Diagnosis: Shoulder Symptoms with Pain, Impaired Posture, Impaired Joint Mobility, Impaired Range of Motion, Impaired Motor Function/Muscle Performance, Radiating Pain and Movement Coordination Impairments  Insurance: 1. [employer]  2. N/A  [employer]/[id number]  ID#: [id number]     NO AUTHORIZATION REQUIRED  $60 CO-PAY  20 VISIT LIMIT PER CALENDAR YEAR  REFERENCE # [id number]     DEDUCTIBLE - $ 5500 / $ 0 MET  CO-INSURANCE - PAYS AT - 80 % AFTER DEDUCTIBLE IS MET  MAX OUT OF POCKET - $ 7350 / $0     Secondary Insurance? NONE     Date of onset/injury: Dec 2017    Diagnosis Precautions: none  Chart reviewed: Relevant co-morbidities, allergies, tests and medications:   No cardiac issues, no pacemaker, no cancer, not diabetic, not allergic to adhesives  Past Medical History:   Diagnosis Date   • High blood pressure    • Thyroid condition      SUBJECTIVE   Description of Problem/Mechanism of Injury:     December 16, 2017 was helping deliver a calf & cow slipped while standing. Patient fell down with the cow with his arm still in the cow. Started anti-inflammtory medication on Thursday last week. Reports he has difficulty sleeping. Has been trying to prop his arm up on a pillow while sleeping which helps. Is typically a back sleeper. 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 States he doesn't have to do any climbing.  States reaching behind his back, moving arm out to the side or reaching overhead cause increased right shoulder pain.  Has been sleeping on the couch lately. States he  has had occasional numbness/tingling in whole right hand mostly while he's sleep.   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 Has been using heat & ice. States he feels heat is better than using ice. Son works full-time & can help with heavy lifting.  In 2010 hx of compartment syndrome with crush injury to right forearm & surgery.     Pain:  Intensity: Now: 3/10; Best: 2/10; Worst: 10/10 (in the last 2 weeks)  Location: Right anterior-lateral shoulder  Quality/Description: Ache, Sharp  Relieving/Alleviating factors: rest, ice, heat, prescribed medication, ibuprofen  Hand Dominance: right    Function:  Limitations and exacerbating factors (patient reported): pain, difficulty, increased time to complete with all activities/tasks with involved extremity  Prior level (patient reported): independent with all activities of daily living and instrumental activities of daily living    Prior Treatment: no therapies in the past year for current condition. Hospitalization, home health services or skilled nursing facility in the last 30 days: No, per patient.    Home Environment/Social Support: Patient lives with spouse.  Patient has consistent assist from family/friends.      Safety:  Do you feel safe at home, work and/or school? yes, per patient  Falls: balance history in last year (< or equal to 2 falls/near falls and/or reasons) does not indicate further testing    Patient Goals/Concerns:  Try & make my right shoulder better.    OBJECTIVE   Hand Dominance: right    Posture/Observation:  Rounded shoulder posture worse on right compared to left. Left scapula protracted & IR.    Range of Motion (degrees)   Norm Left Right   Shoulder                    Date  Initial Initial   Flexion 170-180 150 95*   Extension 50-60 56 42   Abduction 170-180 165 92*   Adduction 50-75 17 5*   Internal Rotation   50 (arm at side) 35 (arm at side)   External  Rotation 80-90 50 (arm at side) 35* (arm at side)   standard testing  positions unless otherwise noted; Key: ranges are reported in active range of motion unless noted as AA=active assistive or P=passive range of motion, * denotes pain   Comments: Only those motions that were assessed are noted.  Equal tightness with shoulder PROM on right    Scapular Assessment Left Right   Resting Position normal elevated; protracted, downwardly rotated   Scapulohumeral Rhythm within functional limits decreased scapular motion   Comments: ; * denotes pain     Strength (out of 5)   Left Right   Date Initial Initial   Shoulder Flexion 4+ 3*   Shoulder Extension 5 4+   Shoulder Abduction 4+ 3*   Shoulder Adduction 4+ 3*   Shoulder Internal Rotation 5 3*   Shoulder External Rotation 4 3*   Elbow Flexion     Elbow Extension     standard testing positions unless otherwise noted,* denotes pain  Comments: Only muscle strength that was assessed are noted.      Palpation:  Tender to palpation at right bicippital groove    Joint Play Assessment:  Moderate-severe capsule Tightness with PROM shoulder IR/ER arm at side   Moderate-severe capsule Tightness with supine PROM flexion & scaption to about 90 degrees    Special Tests:  Crossover Impingement Test: Positive right for impingement of supraspinatus, subscapularis, infraspinatus, teres minor, posterior capsule, acromioclavicular joint, and long head of the biceps  Keyes-William Test: Positive right for impingement of supraspinatus  Neer Test: Positive right for impingement of supraspinatus, subacromial bursa and long head of biceps    Outcome Measures:   Disabilities of the Arm, Shoulder and Hand (DASH): DASH Score Calculated: 34.48   (scored 0-100; a higher score indicates greater disability)     Initial Treatment   Initial evaluation completed.    Therapeutic Activity:  Patient education & completion of scapular retraction, supine AAROM dowel flexion & ER x 10 each  Patient education in shoulder mechanics & anatomy using a shoulder model.   Educated in  activity modification & to use scapular muscles to stabilize as well as keep arm at side.  Seated gentle scapular mobilization to promote scapular retraction/adduction    Ultrasound (47756):  Patient has been made aware of potential contraindications and possible risks associated with the use of modality and has agreed.  Location: right anterior shoulder  Position: sitting  Duration: 10 minutes Duty Cycle: 100% duty cycle  Frequency: 3 Mhz  Intensity: 0.6 W/cm2   Results: no change in symptoms immediately following modality; no adverse reaction to treatment    Initial Home Program:  * above denotes home program issuance as well  Scapular retraction  Supine AAROM cane shoulder flexion & ER    Plan for next session: US, ionto, STM, scapular mobilization, gentle PROM    ASSESSMENT   60 year old male presents to therapy with significant decline in prior level of function due to signs and symptoms consistent with right shoulder rotator cuff strain as well as adhesive capsulitis with pain & tightness in shoulder active and passive range of motion. He has impaired scapular positioning at rest with elevation, protraction, and downwardly rotated. He has impaired scapulohumeral rhythm with decreased scapular motion with attempts at overhead shoulder range of motion and demonstrates compensation with scapular elevation. He had a positive Neer, Keyes-William & Crossover Impingement test as well. He is tender to palpation at his bicipital groove. Treated patient's anterior shoulder with ultrasound to increase blood flow & tissue healing. He was also educated in supine AAROM shoulder flexion & ER to his tolerance as well as scapular retraction for his HEP. His pain increased to 5/10 with special testing, after ultrasound and other exercises his pain decreased to 2/10.    Patient would benefit from skilled Occupational therapy to increase strength/stability, increase range of motion, decrease pain, increase scar tissue mobility,  improve muscle coordination, improve joint mobility, improve activities of daily living and instrumental activites of daily living to address functional limitations in ADLs and IADLs above.    Outcome:    Benefit from skilled therapy: yes   Rehab potential is good due to positive factors age, motivation level and negative factors financial implications of care choices  Predicted patient presentation: clinical decision making of low complexity, no task modification     Plan of care to increase strength/stability, increase range of motion, decrease pain, decrease swelling/edema, improve muscle coordination, improve joint mobility, improve activities of daily living and instrumental activites of daily living, improve body mechanics to address functional limitations listed above.    Goals:  To be obtained by end of this plan of care:  1. Patient independent with modified and progressed home exercise program.  2. Patient will decrease involved shoulder pain/symptoms to 1/10  to aid in normalization of upper extremity movements to aid activities of independent daily living.   3. Patient will increase involved shoulder active range of motion to WFL° to aid in normalization of upper extremity movements to aid activities of independent daily living.   4. Patient will increase involved shoulder strength to within functional limits to aid in normalization of upper extremity movements to aid activities of independent daily living.  5. Patient will be able to reach behind back with minimal pain/difficulty to improve function in dressing.   6. Patient will be able to reach over head with minimal pain/difficulty to improve function in cooking, reaching into cupboard, grooming.  7. Patient will be able to sleep 6 hours without disruption from pain.   8. Patient will demonstrate within functional limits involved scapulohumeral rhythm to aid in normalization of upper extremity movements to aid activities of independent daily  living.    PLAN   Frequency/Duration: 1 times per week for 12 weeks with tapering as the patient progresses  Skilled training and instruction for the following interventions:  Activities of Daily Living/Self Care (50960)  Manual Therapy (52138)  Neuromuscular Re-Education (48770)  Therapeutic Activity (67459)  Therapeutic Exercise (06991)  Electrical Stimulation (51839//73733)   Heat/Cold (28836)  Iontophoresis (73885) dexamethasone sodium phosphate, 4mg/ml  Ultrasound/Phonophoresis (93670)    The plan of care and goals were established with the patient who concurs.  Patient has been given attendance policy at time of initial evaluation.    Patient Education:  Who will be receiving education: patient  Are they ready to learn: yes  Preferred learning style: written, verbal, demonstration  Barriers to learning: no barriers apparent at this time   Result of initial outlined education: Verbalizes understanding and Demonstrates understanding    THERAPY DAILY BILLING   Primary Insurance: [facility]  Secondary Insurance: N/A    Evaluation Procedures:  Occupational Therapy Evaluation: Low Complexity    Timed Procedures:  Therapeutic Activity, 20 minutes  Ultrasound, 10 minutes    Untimed Procedures:  No untimed codes were used on this date of service    Total Treatment Time: 60 minutes    Electronically sent for physician signature    Yes

## 2024-12-20 NOTE — ED ADULT NURSE NOTE - NSICDXPASTSURGICALHX_GEN_ALL_CORE_FT
PAST SURGICAL HISTORY:  H/O shoulder surgery     S/P cataract surgery right    S/P colostomy 2/2024    S/P ORIF (open reduction internal fixation) fracture b/l legs    Suprapubic catheter     
General

## 2024-12-20 NOTE — H&P ADULT - ASSESSMENT
74 year-old man with history of anemia of chronic dz, CKD4, Scr 4 at baseline, HTN, polio, wheelchair bound, DVT(RLE) 5/2024, BPH w LUTS, 2/2024 bladder outlet obstruction ( 2/2 neurogenic bladder) followed by suprapubic catheter placement complicated by rectal rupture requiring a diverting sigmoid loop colostomy reversal of which was done in November 2024 at another hospital  Ptn lives w a chronic suprapubic catheter   on 12/20  ptn presents  to the [facility] ER with worsening azotemia    Family states ever since the  colostomy reversal on 11/20/24.  he has not been himself.   His urine output has been reduced - 400cc/d rather then 2L/d. His urine has had a green tinge to it. He has had a sensation of suprapubic pain and fullness. Additionally, he has suffered from generalized weakness, muted voice, and loss of appetite for the past few weeks, and some nausea over the past few days. He has had moderate to severe back pain for the past several weeks; his family was worried this may be due to his kidneys; he suspects that it is due to having a poor mattress to sleep on. He underwent suprapubic catheter change on Monday 12/16/24; since then, his urine output has returned to his baseline of ~2L/day.     Bloodwork performed with his PCP Dr. Mcdowell on 11/29/24 revealed azotemia, ptn was sent by outptn nephrologist to the ED    Here Scr 7.51, pH7.22, HCO3 17, K 7.5, Na 129    Ptn seen by renal Dr. 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   He is uremic however no urgent need for HD   We can monitor him for the next 2-3 days - if by 12/23  his numbers are not improving and if he is not improving clinically, we can look to initiate dialysis at that point as per renal.     s/p suprapubic exchange 12/16/24.  start IVF w Bicarb drip, check BMP+Mg+PO4 at least daily  IV antiemetics prn nausea/vomiting  Urine: urinalysis, urine culture though may not be accurate 2/2 chronic catheter.   cont outptn meds  hold all nephrotoxic agents  DVT ppx w HSC  GI ppx w PPI

## 2024-12-20 NOTE — H&P ADULT - HISTORY OF PRESENT ILLNESS
74 year-old man with history of multiple medical issues including polio with associated neurogenic bladder/suprapubic catheter, hypertension, and chronic kidney disease with base creatinine 4-5mg/dL. He presented today to the [facility] ER with worsening azotemia, seen by renal in the ED    in 2/2023 while admitted at [facility] with facial cellulitis he was noted to have a severely distended bladder/associated SNODRA.   He therefore underwent suprapubic catheter placement during the admission. The procedure was complicated by rectal rupture, and he ultimately required colostomy placement. Between 2/2023 and 11/2024, his creatinine was "always in the 4s". He underwent scheduled colostomy reversal on 11/20/24. Since then, he has not been himself. His urine output has been reduced - 400cc/d rather then 2L/d. His urine has had a green tinge to it. He has had a sensation of suprapubic pain and fullness. Additionally, he has suffered from generalized weakness, muted voice, and loss of appetite for the past few weeks, and some nausea over the past few days. He has had moderate to severe back pain for the past several weeks; his family was worried this may be due to his kidneys; he suspects that it is due to having a poor mattress to sleep on. He underwent suprapubic catheter change on Monday 12/16/24; since then, his urine output has returned to his baseline of ~2L/day. They share that he had bloodwork performed with his PCP Dr. Mcdowell on 11/29/24. His family asked that the labwork be sent to Nephrology Dr. Jaquan Soares early this week. Upon seeing the labwork results, and hearing about his recent symptoms, Dr. Soares advised that he go to the ER.

## 2024-12-20 NOTE — H&P ADULT - NSHPPHYSICALEXAM_GEN_ALL_CORE
T(C): 36.7 (12-20-24 @ 12:36), Max: 36.7 (12-20-24 @ 12:36)  HR: 90 (12-20-24 @ 14:24) (90 - 93)  BP: 133/86 (12-20-24 @ 14:24) (133/86 - 165/96)  RR: 20 (12-20-24 @ 14:24) (20 - 20)  SpO2: 100% (12-20-24 @ 14:24) (98% - 100%)    PHYSICAL EXAM:  GENERAL: NAD, well-developed  HEAD:  Atraumatic, Normocephalic  EYES: EOMI, PERRLA, conjunctiva and sclera clear  NECK: Supple, No JVD  CHEST/LUNG: Clear to auscultation bilaterally; No wheeze  HEART: Regular rate and rhythm; No murmurs, rubs, or gallops  ABDOMEN: Soft, Nontender, Nondistended; Bowel sounds present  EXTREMITIES:  2+ Peripheral Pulses, No clubbing, cyanosis, or edema  PSYCH: AAOx3  NEUROLOGY: non-focal  SKIN: No rashes or lesions

## 2024-12-20 NOTE — CONSULT NOTE ADULT - SUBJECTIVE AND OBJECTIVE BOX
CC: left ear pain     HPI: 74 year old, A&Ox4 Male, with PMHx of polio with associated neurogenic bladder/superpubic catherter, HTN, HLD, CKD, presented to ED today for worsening azotemia. ENT consulted for left ear pain. Per patient, he underwent colostomy reversal procedure on 11/20/2024. Since then he has been experiencing intermitten left ear pain for almost a month. Patient also endorses left tinnitus during those painful episodes. He went to pharmacy, was given pain relieving ear drops(can't remember exact name), which only relieved pain temporarily. Endorses mild hearing loss; however denies vertigo, ear bleed, or external ear trauma.     PAST MEDICAL & SURGICAL HISTORY:  Polio  Hypertension  H/O urinary retention  suprapubic tube  HLD (hyperlipidemia)  BPH with obstruction/lower urinary tract symptoms  Erectile dysfunction  Bladder outlet obstruction  Presence of suprapubic catheter  Colostomy status  DVT, lower extremity  H/O cardiac murmur  S/P colostomy 2/2024  Suprapubic catheter  S/P ORIF (open reduction internal fixation) fracture b/l legs  S/P cataract surgery right  H/O shoulder surgery    Allergies  No Known Allergies    Intolerances    MEDICATIONS  (STANDING):  amLODIPine   Tablet 5 milliGRAM(s) Oral daily  atorvastatin 40 milliGRAM(s) Oral at bedtime  heparin   Injectable 5000 Unit(s) SubCutaneous every 12 hours  pantoprazole    Tablet 40 milliGRAM(s) Oral before breakfast  sodium bicarbonate  Infusion 0.06 mEq/kG/Hr (75 mL/Hr) IV Continuous <Continuous>    MEDICATIONS  (PRN):  acetaminophen     Tablet .. 650 milliGRAM(s) Oral every 6 hours PRN Temp greater or equal to 38C (100.4F), Mild Pain (1 - 3)  ondansetron Injectable 4 milliGRAM(s) IV Push every 6 hours PRN Nausea and/or Vomiting  oxyCODONE    IR 10 milliGRAM(s) Oral every 6 hours PRN for moderate pain  zolpidem 5 milliGRAM(s) Oral at bedtime PRN Insomnia    Social History: denies drinking or smoking   Family history: diabetes     ROS:   ENT: all negative except as noted in HPI   CV: denies palpitations  Pulm: denies SOB, cough, hemoptysis  GI: denies change in appetite, indigestion, n/v  Neuro: denies numbness/tingling, loss of sensation  Psych: denies anxiety  MS: endorse chronic muscle weakness, instability  Heme: denies easy bruising or bleeding  Endo: denies heat/cold intolerance, excessive sweating  Vascular: denies LE edema    Vital Signs Last 24 Hrs  T(C): 36.7 (20 Dec 2024 12:36), Max: 36.7 (20 Dec 2024 12:36)  T(F): 98 (20 Dec 2024 12:36), Max: 98 (20 Dec 2024 12:36)  HR: 90 (20 Dec 2024 14:24) (90 - 93)  BP: 133/86 (20 Dec 2024 14:24) (133/86 - 165/96)  BP(mean): --  RR: 20 (20 Dec 2024 14:24) (20 - 20)  SpO2: 100% (20 Dec 2024 14:24) (98% - 100%)    Parameters below as of 20 Dec 2024 14:24  Patient On (Oxygen Delivery Method): room air             12.3   9.13  )-----------( 276      ( 20 Dec 2024 14:23 )             38.3    12-20    132[L]  |  98  |  105[H]  ----------------------------<  128[H]  6.3[HH]   |  15[L]  |  7.38[H]    Ca    8.0[L]      20 Dec 2024 18:51    TPro  7.6  /  Alb  3.8  /  TBili  0.3  /  DBili  x   /  AST  12  /  ALT  9[L]  /  AlkPhos  108  12-20     PHYSICAL EXAM:  Gen: NAD  Skin: No rashes, bruises, or lesions  Head: Normocephalic, Atraumatic  Face: no edema, erythema, or fluctuance. Parotid glands soft without mass  Eyes: no scleral injection  Ears: Right: scant dry cerumen in ear canal.  TM intact without effusion or erythema. No evidence of any fluid drainage. No mastoid tenderness, erythema, or ear bulging            Left: white with scattered black debris against TM. Unable to visualized TM, No evidence of any fluid drainage. mild tragal tenderness, but No mastoid tenderness, erythema, or ear bulging  Nose: Nares bilaterally patent, no discharge  Mouth: No Stridor / Drooling / Trismus.  Mucosa moist, tongue/uvula midline, oropharynx clear  Neck: Flat, supple, no lymphadenopathy, trachea midline, no masses  Lymphatic: No lymphadenopathy  Resp: breathing easily, no stridor  CV: no peripheral edema/cyanosis  GI: nondistended   Peripheral vascular: no JVD or edema  Neuro: facial nerve intact, no facial droop

## 2024-12-20 NOTE — CONSULT NOTE ADULT - SUBJECTIVE AND OBJECTIVE BOX
HPI: Mr. Delgado is a 74 year-old man with history of multiple medical issues including polio with associated neurogenic bladder/suprapubic catheter, hypertension, and chronic kidney disease with base creatinine 4-5mg/dL. He presented today to the [facility] ER with worsening azotemia; his creatinine was up to 7mg/dL as outpatient. He is known to my partners Dr. Jaquan Soares and Dr. Jacob Evans as outpatient; that being said, he has not seen them at the office since 2022. I see from his prior documentation that he has been noncompliant with recommendations in the past.        PAST MEDICAL & SURGICAL HISTORY:  Polio - wheelchair-dependent  Neurogenic bladder - suprapubic catheter  Erectile dysfunction  HTN  HLD  Colostomy/reversal  DVT  B/L LE fracture ==>ORIF  Cataract surgery - right  Shoulder surgery    Allergies  No Known Allergies    SOCIAL HISTORY:  Denies ETOh,Smoking,     FAMILY HISTORY:  FH: type 2 diabetes    REVIEW OF SYSTEMS:  CONSTITUTIONAL: No weakness, fevers or chills  EYES/ENT: No visual changes;  No vertigo or throat pain   NECK: No pain or stiffness  RESPIRATORY: No cough, wheezing, hemoptysis; No shortness of breath  CARDIOVASCULAR: No chest pain or palpitations  GASTROINTESTINAL: No abdominal or epigastric pain. No nausea, vomiting, or hematemesis; No diarrhea or constipation. No melena or hematochezia.  GENITOURINARY: No dysuria, frequency or hematuria  NEUROLOGICAL: No numbness or weakness  SKIN: No itching, burning, rashes, or lesions   All other review of systems is negative unless indicated above.    VITAL:  T(C): , Max: 36.7 (12-20-24 @ 12:36)  T(F): , Max: 98 (12-20-24 @ 12:36)  HR: 93 (12-20-24 @ 12:36)  BP: 165/96 (12-20-24 @ 12:36)  RR: 20 (12-20-24 @ 12:36)  SpO2: 98% (12-20-24 @ 12:36)    PHYSICAL EXAM:  Constitutional: NAD, Alert  HEENT: NCAT, MMM  Neck: Supple, No JVD  Respiratory: CTA-b/l  Cardiovascular: RRR s1s2, no m/r/g  Gastrointestinal: BS+, soft, NT/ND  Extremities: No peripheral edema b/l  Neurological: no focal deficits; strength grossly intact  Back: no CVAT b/l  Skin: No rashes, no nevi    LABS:    (12/16/24) - , Cr 7.75  (11/21/24)-  BUN 83, Cr 5.06        ASSESSMENT:  (1)CKD - stage 5 - likely that the creatinine of 7 is his baseline at this point  (2)    RECOMMEND:      Thank you for involving [facility] in this patient's care.    With warm regards,    Rafita Denny MD   [facility]  Office: [phone]  Cell: [phone]               HPI: Mr. Delgado is a 74 year-old man with history of multiple medical issues including polio with associated neurogenic bladder/suprapubic catheter, hypertension, and chronic kidney disease with base creatinine 4-5mg/dL. He presented today to the [facility] ER with worsening azotemia; in light of the worsening azotemia, a renal consultation was requested upon admission.    History is presented by Mr. Delgado, his daughter Norma (bedside), and by another daughter (by phone). They share that in 2/2023, when he was admitted at [facility] with facial cellulitis. At that time, he was noted to have a severely distended bladder/associated SONDRA. He therefore underwent suprapubic catheter placement during the admission. The procedure was complicated by rectal rupture, and he ultimately required colostomy placement. Between 2/2023 and 11/2024, his creatinine was "always in the 4s". He underwent scheduled colostomy reversal on 11/20/24. Since then, he has not been himself. His urine output has been reduced - 400cc/d rather then 2L/d. His urine has had a green tinge to it. He has had a sensation of suprapubic pain and fullness. Additionally, he has suffered from generalized weakness, muted voice, and loss of appetite for the past few weeks, and some nausea over the past few days. He has had moderate to severe back pain for the past several weeks; his family was worried this may be due to his kidneys; he suspects that it is due to having a poor mattress to sleep on. He underwent suprapubic catheter change on Monday 12/16/24; since then, his urine output has returned to his baseline of ~2L/day. They share that he had bloodwork performed with his PCP Dr. Mcdowell on 11/29/24. His family asked that the labwork be sent to Nephrology Dr. Jaquan Soares early this week. Upon seeing the labwork results, and hearing about his recent symptoms, Dr. Soares advised that he go to the ER.        PAST MEDICAL & SURGICAL HISTORY:  Polio - wheelchair-dependent  Neurogenic bladder - suprapubic catheter  Erectile dysfunction  HTN  HLD  Colostomy/reversal  DVT  B/L LE fracture ==>ORIF  Cataract surgery - right  Shoulder surgery    Allergies  No Known Allergies    SOCIAL HISTORY:  Denies ETOh,Smoking,     FAMILY HISTORY:  FH: type 2 diabetes    REVIEW OF SYSTEMS:  CONSTITUTIONAL:(+)generalized weakness, no fever or chills  EYES/ENT: (+)muted voice  NECK: No pain or stiffness  RESPIRATORY: No cough, wheezing, hemoptysis; No shortness of breath  CARDIOVASCULAR: No chest pain or palpitations  GASTROINTESTINAL: (+)nausea, (+)loss of appetite  GENITOURINARY: (+)suprapubic distension, (+)sensation of incomplete evacuation, (+)reduced urine output, (+)green urine  NEUROLOGICAL: (+)back pain; (+)tremor  SKIN: No itching, burning, rashes, or lesions   All other review of systems is negative unless indicated above.    VITAL:  T(C): , Max: 36.7 (12-20-24 @ 12:36)  T(F): , Max: 98 (12-20-24 @ 12:36)  HR: 93 (12-20-24 @ 12:36)  BP: 165/96 (12-20-24 @ 12:36)  RR: 20 (12-20-24 @ 12:36)  SpO2: 98% (12-20-24 @ 12:36)    PHYSICAL EXAM:  Constitutional: NAD, Alert  HEENT: NCAT, DMM  Neck: Supple, No JVD  Respiratory: CTA-b/l  Cardiovascular: RRR s1s2, no m/r/g  Gastrointestinal: BS+, soft, NT/ND  : (+)legbag - yellow-green urine  Extremities: No peripheral edema b/l  Neurological: 0/5 strength of b/l LE  Back: no CVAT b/l  Skin: No rashes, no nevi    LABS:    (12/16/24) - , Cr 7.75  (11/21/24)-  BUN 83, Cr 5.06        ASSESSMENT:  (1)CKD - stage 5 - due to irreversible injury from obstructive uropathy, I presume. Base creatinine hopefully is still in the 4s    (2)SONDRA - ATN induced either by prolonged obstruction in association with suboptimal suprapubic catheter function prior to catheter change 12/16....or due to ischemic ATN in association with his recent colostomy reversal surgery. He is uremic; that being said, there is no urgent need for HD now. We can monitor him for the next 2-3 days - if by Monday his numbers are not improving and if he is not improving clinically, we can look to initiate dialysis at that point. The labs from the ER are pending; hopefully the numbers are now starting to improve, s/p suprapubic exchange 12/16/24.    (3)Lytes - pending from ER    (4)CV - mildly hypovolemic?      RECOMMEND:  (1)BMP+Mg+PO4 at least daily, including today  (2)IV antiemetics prn nausea/vomiting  (3)IVF - 1/2NS+75meq/L NaHCO3 @75cc/h  (4)Meds for GFR<15  (5)Urine: urinalysis, urine culture  (6)No HD for now     counseled patient and family extensively regarding the CKD and SONDRA and regarding plan of care. Counseled regarding risks and benefits of HD. They agree to HD if needed.    Thank you for involving [facility] in this patient's care.    With warm regards,    Rafita Denny MD   [facility]  Office: [phone]  Cell: [phone]

## 2024-12-20 NOTE — CONSULT NOTE ADULT - ASSESSMENT
74 year old, A&Ox4 Male, with PMHx of polio with associated neurogenic bladder/superpubic catherter, HTN, HLD, CKD, presented to ED today for worsening azotemia. ENT consulted for one month history of intermitten left ear pain. pt c/o intermittent left ear throbbing ear pain, tinnitus and mild hearing loss. Exam is significant for mild left tragal tenderness. anterior 2/3 of ear canal patent. posterior 1/3 ear occluded with white and black debris. Cultures taken. no abnormal discharge during exam. No TMJ during exam. right ear unremarkable.

## 2024-12-21 LAB
ANION GAP SERPL CALC-SCNC: 16 MMOL/L — SIGNIFICANT CHANGE UP (ref 5–17)
ANION GAP SERPL CALC-SCNC: 18 MMOL/L — HIGH (ref 5–17)
ANION GAP SERPL CALC-SCNC: 19 MMOL/L — HIGH (ref 5–17)
APPEARANCE UR: ABNORMAL
APTT BLD: 33.1 SEC — SIGNIFICANT CHANGE UP (ref 24.5–35.6)
BACTERIA # UR AUTO: ABNORMAL /HPF
BILIRUB UR-MCNC: NEGATIVE — SIGNIFICANT CHANGE UP
BLD GP AB SCN SERPL QL: NEGATIVE — SIGNIFICANT CHANGE UP
BUN SERPL-MCNC: 103 MG/DL — HIGH (ref 7–23)
BUN SERPL-MCNC: 105 MG/DL — HIGH (ref 7–23)
BUN SERPL-MCNC: 97 MG/DL — HIGH (ref 7–23)
CALCIUM SERPL-MCNC: 7.1 MG/DL — LOW (ref 8.4–10.5)
CALCIUM SERPL-MCNC: 7.7 MG/DL — LOW (ref 8.4–10.5)
CALCIUM SERPL-MCNC: 8 MG/DL — LOW (ref 8.4–10.5)
CAST: 6 /LPF — HIGH (ref 0–4)
CHLORIDE SERPL-SCNC: 91 MMOL/L — LOW (ref 96–108)
CHLORIDE SERPL-SCNC: 95 MMOL/L — LOW (ref 96–108)
CHLORIDE SERPL-SCNC: 95 MMOL/L — LOW (ref 96–108)
CO2 SERPL-SCNC: 16 MMOL/L — LOW (ref 22–31)
CO2 SERPL-SCNC: 18 MMOL/L — LOW (ref 22–31)
CO2 SERPL-SCNC: 20 MMOL/L — LOW (ref 22–31)
COLOR SPEC: YELLOW — SIGNIFICANT CHANGE UP
CREAT SERPL-MCNC: 6.67 MG/DL — HIGH (ref 0.5–1.3)
CREAT SERPL-MCNC: 7.06 MG/DL — HIGH (ref 0.5–1.3)
CREAT SERPL-MCNC: 7.21 MG/DL — HIGH (ref 0.5–1.3)
DIFF PNL FLD: ABNORMAL
EGFR: 7 ML/MIN/1.73M2 — LOW
EGFR: 8 ML/MIN/1.73M2 — LOW
EGFR: 8 ML/MIN/1.73M2 — LOW
GAS PNL BLDV: SIGNIFICANT CHANGE UP
GLUCOSE BLDC GLUCOMTR-MCNC: 117 MG/DL — HIGH (ref 70–99)
GLUCOSE SERPL-MCNC: 102 MG/DL — HIGH (ref 70–99)
GLUCOSE SERPL-MCNC: 133 MG/DL — HIGH (ref 70–99)
GLUCOSE SERPL-MCNC: 93 MG/DL — SIGNIFICANT CHANGE UP (ref 70–99)
GLUCOSE UR QL: NEGATIVE MG/DL — SIGNIFICANT CHANGE UP
GRAM STN FLD: SIGNIFICANT CHANGE UP
HBV SURFACE AG SER-ACNC: SIGNIFICANT CHANGE UP
HCT VFR BLD CALC: 34.5 % — LOW (ref 39–50)
HCV AB S/CO SERPL IA: 0.05 S/CO — SIGNIFICANT CHANGE UP
HCV AB SERPL-IMP: SIGNIFICANT CHANGE UP
HGB BLD-MCNC: 11 G/DL — LOW (ref 13–17)
INR BLD: 1.15 RATIO — SIGNIFICANT CHANGE UP (ref 0.85–1.16)
KETONES UR-MCNC: NEGATIVE MG/DL — SIGNIFICANT CHANGE UP
LEUKOCYTE ESTERASE UR-ACNC: ABNORMAL
MAGNESIUM SERPL-MCNC: 2.5 MG/DL — SIGNIFICANT CHANGE UP (ref 1.6–2.6)
MCHC RBC-ENTMCNC: 28.4 PG — SIGNIFICANT CHANGE UP (ref 27–34)
MCHC RBC-ENTMCNC: 31.9 G/DL — LOW (ref 32–36)
MCV RBC AUTO: 88.9 FL — SIGNIFICANT CHANGE UP (ref 80–100)
NITRITE UR-MCNC: NEGATIVE — SIGNIFICANT CHANGE UP
NRBC # BLD: 0 /100 WBCS — SIGNIFICANT CHANGE UP (ref 0–0)
PH UR: 6.5 — SIGNIFICANT CHANGE UP (ref 5–8)
PHOSPHATE SERPL-MCNC: 7.1 MG/DL — HIGH (ref 2.5–4.5)
PLATELET # BLD AUTO: 211 K/UL — SIGNIFICANT CHANGE UP (ref 150–400)
POTASSIUM SERPL-MCNC: 5.3 MMOL/L — SIGNIFICANT CHANGE UP (ref 3.5–5.3)
POTASSIUM SERPL-MCNC: 5.5 MMOL/L — HIGH (ref 3.5–5.3)
POTASSIUM SERPL-MCNC: 6.2 MMOL/L — CRITICAL HIGH (ref 3.5–5.3)
POTASSIUM SERPL-SCNC: 5.3 MMOL/L — SIGNIFICANT CHANGE UP (ref 3.5–5.3)
POTASSIUM SERPL-SCNC: 5.5 MMOL/L — HIGH (ref 3.5–5.3)
POTASSIUM SERPL-SCNC: 6.2 MMOL/L — CRITICAL HIGH (ref 3.5–5.3)
PROT UR-MCNC: 300 MG/DL
PROTHROM AB SERPL-ACNC: 13.2 SEC — SIGNIFICANT CHANGE UP (ref 9.9–13.4)
RBC # BLD: 3.88 M/UL — LOW (ref 4.2–5.8)
RBC # FLD: 13.6 % — SIGNIFICANT CHANGE UP (ref 10.3–14.5)
RBC CASTS # UR COMP ASSIST: 19 /HPF — HIGH (ref 0–4)
REVIEW: SIGNIFICANT CHANGE UP
RH IG SCN BLD-IMP: POSITIVE — SIGNIFICANT CHANGE UP
RH IG SCN BLD-IMP: POSITIVE — SIGNIFICANT CHANGE UP
SODIUM SERPL-SCNC: 128 MMOL/L — LOW (ref 135–145)
SODIUM SERPL-SCNC: 129 MMOL/L — LOW (ref 135–145)
SODIUM SERPL-SCNC: 131 MMOL/L — LOW (ref 135–145)
SP GR SPEC: 1.01 — SIGNIFICANT CHANGE UP (ref 1–1.03)
SPECIMEN SOURCE: SIGNIFICANT CHANGE UP
SQUAMOUS # UR AUTO: 2 /HPF — SIGNIFICANT CHANGE UP (ref 0–5)
T3 SERPL-MCNC: 99 NG/DL — SIGNIFICANT CHANGE UP (ref 80–200)
T4 AB SER-ACNC: 7.8 UG/DL — SIGNIFICANT CHANGE UP (ref 4.6–12)
TSH SERPL-MCNC: 3.46 UIU/ML — SIGNIFICANT CHANGE UP (ref 0.27–4.2)
UROBILINOGEN FLD QL: 0.2 MG/DL — SIGNIFICANT CHANGE UP (ref 0.2–1)
WBC # BLD: 6.96 K/UL — SIGNIFICANT CHANGE UP (ref 3.8–10.5)
WBC # FLD AUTO: 6.96 K/UL — SIGNIFICANT CHANGE UP (ref 3.8–10.5)
WBC UR QL: >998 /HPF — HIGH (ref 0–5)

## 2024-12-21 RX ORDER — SODIUM ZIRCONIUM CYCLOSILICATE 10 G/10G
10 POWDER, FOR SUSPENSION ORAL ONCE
Refills: 0 | Status: COMPLETED | OUTPATIENT
Start: 2024-12-21 | End: 2024-12-21

## 2024-12-21 RX ORDER — BUMETANIDE 2 MG/1
1 TABLET ORAL
Qty: 20 | Refills: 0 | Status: DISCONTINUED | OUTPATIENT
Start: 2024-12-21 | End: 2024-12-22

## 2024-12-21 RX ORDER — DIPHENHYDRAMINE HCL 25 MG
25 TABLET ORAL ONCE
Refills: 0 | Status: COMPLETED | OUTPATIENT
Start: 2024-12-21 | End: 2024-12-21

## 2024-12-21 RX ORDER — SODIUM BICARBONATE 84 MG/ML
0.12 INJECTION, SOLUTION INTRAVENOUS
Qty: 150 | Refills: 0 | Status: DISCONTINUED | OUTPATIENT
Start: 2024-12-21 | End: 2024-12-22

## 2024-12-21 RX ORDER — ACETIC ACID 20 MG/ML
4 SOLUTION AURICULAR (OTIC) EVERY 6 HOURS
Refills: 0 | Status: DISCONTINUED | OUTPATIENT
Start: 2024-12-21 | End: 2024-12-24

## 2024-12-21 RX ORDER — DEXTROSE MONOHYDRATE 25 G/50ML
50 INJECTION, SOLUTION INTRAVENOUS ONCE
Refills: 0 | Status: COMPLETED | OUTPATIENT
Start: 2024-12-21 | End: 2024-12-21

## 2024-12-21 RX ORDER — CALCIUM ACETATE 667 MG/1
667 CAPSULE ORAL
Refills: 0 | Status: DISCONTINUED | OUTPATIENT
Start: 2024-12-21 | End: 2024-12-25

## 2024-12-21 RX ORDER — INFLUENZA A VIRUS A/WISCONSIN/588/2019 (H1N1) RECOMBINANT HEMAGGLUTININ ANTIGEN, INFLUENZA A VIRUS A/DARWIN/6/2021 (H3N2) RECOMBINANT HEMAGGLUTININ ANTIGEN, INFLUENZA B VIRUS B/AUSTRIA/1359417/2021 RECOMBINANT HEMAGGLUTININ ANTIGEN, AND INFLUENZA B VIRUS B/PHUKET/3073/2013 RECOMBINANT HEMAGGLUTININ ANTIGEN 45; 45; 45; 45 UG/.5ML; UG/.5ML; UG/.5ML; UG/.5ML
0.5 INJECTION INTRAMUSCULAR ONCE
Refills: 0 | Status: DISCONTINUED | OUTPATIENT
Start: 2024-12-21 | End: 2024-12-25

## 2024-12-21 RX ORDER — INSULIN HUMAN 100 [IU]/ML
5 INJECTION, SOLUTION SUBCUTANEOUS ONCE
Refills: 0 | Status: COMPLETED | OUTPATIENT
Start: 2024-12-21 | End: 2024-12-21

## 2024-12-21 RX ADMIN — ATORVASTATIN CALCIUM 40 MILLIGRAM(S): 40 TABLET, FILM COATED ORAL at 23:13

## 2024-12-21 RX ADMIN — SODIUM BICARBONATE 75 MEQ/KG/HR: 84 INJECTION, SOLUTION INTRAVENOUS at 07:35

## 2024-12-21 RX ADMIN — Medication 10 MILLIGRAM(S): at 23:13

## 2024-12-21 RX ADMIN — Medication 25 MILLIGRAM(S): at 10:34

## 2024-12-21 RX ADMIN — ACETIC ACID 4 DROP(S): 20 SOLUTION AURICULAR (OTIC) at 23:13

## 2024-12-21 RX ADMIN — ACETAMINOPHEN 650 MILLIGRAM(S): 80 SOLUTION/ DROPS ORAL at 01:45

## 2024-12-21 RX ADMIN — Medication 10 MILLIGRAM(S): at 01:45

## 2024-12-21 RX ADMIN — ACETIC ACID 4 DROP(S): 20 SOLUTION AURICULAR (OTIC) at 14:35

## 2024-12-21 RX ADMIN — PANTOPRAZOLE 40 MILLIGRAM(S): 40 TABLET, DELAYED RELEASE ORAL at 07:36

## 2024-12-21 RX ADMIN — Medication 10 MILLIGRAM(S): at 01:13

## 2024-12-21 RX ADMIN — DEXTROSE MONOHYDRATE 50 MILLILITER(S): 25 INJECTION, SOLUTION INTRAVENOUS at 10:32

## 2024-12-21 RX ADMIN — Medication 10 MILLIGRAM(S): at 23:38

## 2024-12-21 RX ADMIN — BUMETANIDE 5 MG/HR: 2 TABLET ORAL at 10:58

## 2024-12-21 RX ADMIN — INSULIN HUMAN 5 UNIT(S): 100 INJECTION, SOLUTION SUBCUTANEOUS at 10:32

## 2024-12-21 RX ADMIN — CALCIUM ACETATE 667 MILLIGRAM(S): 667 CAPSULE ORAL at 16:50

## 2024-12-21 RX ADMIN — SODIUM ZIRCONIUM CYCLOSILICATE 10 GRAM(S): 10 POWDER, FOR SUSPENSION ORAL at 10:31

## 2024-12-21 RX ADMIN — HEPARIN SODIUM 5000 UNIT(S): 1000 INJECTION, SOLUTION INTRAVENOUS; SUBCUTANEOUS at 17:00

## 2024-12-21 RX ADMIN — ACETIC ACID 4 DROP(S): 20 SOLUTION AURICULAR (OTIC) at 19:53

## 2024-12-21 RX ADMIN — Medication 10 MILLIGRAM(S): at 16:42

## 2024-12-21 RX ADMIN — SODIUM BICARBONATE 75 MEQ/KG/HR: 84 INJECTION, SOLUTION INTRAVENOUS at 10:57

## 2024-12-21 RX ADMIN — HEPARIN SODIUM 5000 UNIT(S): 1000 INJECTION, SOLUTION INTRAVENOUS; SUBCUTANEOUS at 10:34

## 2024-12-21 RX ADMIN — SODIUM ZIRCONIUM CYCLOSILICATE 10 GRAM(S): 10 POWDER, FOR SUSPENSION ORAL at 19:53

## 2024-12-21 RX ADMIN — Medication 25 MILLIGRAM(S): at 01:13

## 2024-12-21 RX ADMIN — Medication 10 MILLIGRAM(S): at 17:45

## 2024-12-21 RX ADMIN — Medication 5 MILLIGRAM(S): at 10:34

## 2024-12-21 NOTE — PROVIDER CONTACT NOTE (OTHER) - BACKGROUND
75 yo male presented to ED on 12/20 with worsening azotemia; SONDRA, Hyperkalemia, Hyponatremia, and Left ear pain

## 2024-12-21 NOTE — PATIENT PROFILE ADULT - FALL HARM RISK - HARM RISK INTERVENTIONS

## 2024-12-21 NOTE — PROGRESS NOTE ADULT - SUBJECTIVE AND OBJECTIVE BOX
Patient is a 74y old  Male who presents with a chief complaint of     SUBJECTIVE / OVERNIGHT EVENTS: ongoing hyperkalemia. will start HD, vascular called to place [facility]    MEDICATIONS  (STANDING):  acetic acid 2% Solution 4 Drop(s) Left Ear every 6 hours  amLODIPine   Tablet 5 milliGRAM(s) Oral daily  atorvastatin 40 milliGRAM(s) Oral at bedtime  buMETAnide Infusion 1 mG/Hr (5 mL/Hr) IV Continuous <Continuous>  heparin   Injectable 5000 Unit(s) SubCutaneous every 12 hours  pantoprazole    Tablet 40 milliGRAM(s) Oral before breakfast  sodium bicarbonate  Infusion 0.119 mEq/kG/Hr (75 mL/Hr) IV Continuous <Continuous>    MEDICATIONS  (PRN):  acetaminophen     Tablet .. 650 milliGRAM(s) Oral every 6 hours PRN Temp greater or equal to 38C (100.4F), Mild Pain (1 - 3)  ondansetron Injectable 4 milliGRAM(s) IV Push every 6 hours PRN Nausea and/or Vomiting  oxyCODONE    IR 10 milliGRAM(s) Oral every 6 hours PRN for moderate pain  zolpidem 5 milliGRAM(s) Oral at bedtime PRN Insomnia      Vital Signs Last 24 Hrs  T(F): 97.2 (12-21-24 @ 13:30), Max: 98.7 (12-21-24 @ 07:02)  HR: 81 (12-21-24 @ 13:30) (77 - 90)  BP: 169/82 (12-21-24 @ 13:30) (132/75 - 169/82)  RR: 18 (12-21-24 @ 13:30) (16 - 20)  SpO2: 98% (12-21-24 @ 13:30) (98% - 100%)  Telemetry:   CAPILLARY BLOOD GLUCOSE      POCT Blood Glucose.: 117 mg/dL (21 Dec 2024 10:41)  POCT Blood Glucose.: 127 mg/dL (20 Dec 2024 21:24)  POCT Blood Glucose.: 151 mg/dL (20 Dec 2024 18:17)  POCT Blood Glucose.: 133 mg/dL (20 Dec 2024 16:27)    I&O's Summary      PHYSICAL EXAM:  GENERAL: NAD, well-developed  HEAD:  Atraumatic, Normocephalic  EYES: EOMI, PERRLA, conjunctiva and sclera clear  NECK: Supple, No JVD  CHEST/LUNG: Clear to auscultation bilaterally; No wheeze  HEART: Regular rate and rhythm; No murmurs, rubs, or gallops  ABDOMEN: Soft, Nontender, Nondistended; Bowel sounds present  EXTREMITIES:  2+ Peripheral Pulses, No clubbing, cyanosis, or edema  PSYCH: AAOx3  NEUROLOGY: non-focal  SKIN: No rashes or lesions    LABS:                        11.0   6.96  )-----------( 211      ( 21 Dec 2024 06:44 )             34.5     12-21    129[L]  |  95[L]  |  97[H]  ----------------------------<  93  6.2[HH]   |  16[L]  |  7.21[H]    Ca    7.7[L]      21 Dec 2024 06:44  Phos  7.1     12-21  Mg     2.5     12-21    TPro  7.6  /  Alb  3.8  /  TBili  0.3  /  DBili  x   /  AST  12  /  ALT  9[L]  /  AlkPhos  108  12-20          Urinalysis Basic - ( 21 Dec 2024 06:44 )    Color: x / Appearance: x / SG: x / pH: x  Gluc: 93 mg/dL / Ketone: x  / Bili: x / Urobili: x   Blood: x / Protein: x / Nitrite: x   Leuk Esterase: x / RBC: x / WBC x   Sq Epi: x / Non Sq Epi: x / Bacteria: x        RADIOLOGY & ADDITIONAL TESTS:    Imaging Personally Reviewed:    Consultant(s) Notes Reviewed:      Care Discussed with Consultants/Other Providers:

## 2024-12-21 NOTE — CONSULT NOTE ADULT - SUBJECTIVE AND OBJECTIVE BOX
[facility], Division of Infectious Diseases   SABINO Griffin S. Shah, Y. Patel, G. Casimir  [phone]   weekends and after hours [phone]    BRIAN DEMPSEY  74y, Male  [id number]    HPI--  HPI:  74 year-old man with  polio with associated neurogenic bladder/suprapubic catheter, hypertension, and chronic kidney disease with base creatinine 4-5mg/dL. He presented today to the [facility] ER with worsening azotemia, seen by renal in the ED    history significant for in 2/2023 while admitted at [facility] with facial cellulitis he was noted to have a severely distended bladder/associated SONDRA.   He therefore underwent suprapubic catheter placement during the admission. The procedure was complicated by rectal rupture, and he ultimately required colostomy placement. Between 2/2023 and 11/2024, his creatinine was "always in the 4s". He underwent scheduled colostomy reversal on 11/20/24. Since then, he has not been himself. His urine output has been reduced - 400cc/d rather then 2L/d. His urine has had a green tinge to it. He has had a sensation of suprapubic pain and fullness. Additionally, he has suffered from generalized weakness, muted voice, and loss of appetite for the past few weeks, and some nausea over the past few days. He has had moderate to severe back pain for the past several weeks He underwent suprapubic catheter change on Monday 12/16/24; since then, his urine output has returned to his baseline of ~2L/day.   7 days ago had left ear pain, saw his pcp given otic drops which did not help, then was given augmentin but the ear continued to hurt ,  he had bloodwork performed with his PCP Dr. Mcdowell on 11/29/24. His family asked that the labwork be sent to Nephrology Dr. Jaquan Soares early this week. Upon seeing the labwork results, and hearing about his recent symptoms, Dr. Soares advised that he go to the ER.       PMH/PSH--  Polio  Diabetes  Pulmonary hypertension  Hypertension  H/O urinary retention  HLD (hyperlipidemia)  BPH with obstruction/lower urinary tract symptoms  Type 2 diabetes mellitus  Erectile dysfunction  Bladder outlet obstruction  Presence of suprapubic catheter  Colostomy status  DVT, lower extremity  H/O cardiac murmur  S/P colostomy  Suprapubic catheter  H/O total hip arthroplasty, right  Presence of internal fixation gianluca in upper extremity  S/P ORIF (open reduction internal fixation) fracture  S/P cataract surgery  H/O shoulder surgery        Allergies--  No Known Allergies      Medications--  Antibiotics:   Immunologic:   Other: acetaminophen     Tablet .. PRN  acetic acid 2% Solution  amLODIPine   Tablet  atorvastatin  buMETAnide Infusion  calcium acetate  heparin   Injectable  ondansetron Injectable PRN  oxyCODONE    IR PRN  pantoprazole    Tablet  sodium bicarbonate  Infusion  zolpidem PRN      Social History--  EtOH: denies ***  Tobacco: denies ***  Drug Use: denies ***    Family/Marital History--  FH: type 2 diabetes         Travel/Environmental/Occupational History:  nc    Review of Systems:  REVIEW OF SYSTEMS  General: no fever, no chills, no wt loss	  Ophthalmologic: no blurry vision  Respiratory and Thorax: no cough, no dyspnea  Cardiovascular: no chest pain, no palpitations  Gastrointestinal:  no nausea, no vomiting, diarrhea  Genitourinary: no dysuria, no urgency, no frequency	  Musculoskeletal: no myalgias	  Neurological:  no headache	    Physical Exam--  Vital Signs: T(F): 97.2 (12-21-24 @ 13:30), Max: 98.7 (12-21-24 @ 07:02)  HR: 81 (12-21-24 @ 13:30)  BP: 169/82 (12-21-24 @ 13:30)  RR: 18 (12-21-24 @ 13:30)  SpO2: 98% (12-21-24 @ 13:30)  Wt(kg): --  General: Nontoxic-appearing Male in no acute distress.  HEENT: AT/NC. left ear no cellulitis no drainage  not hot or tender   Neck: Not rigid. No sense of mass.  Nodes: None palpable.  Lungs: Clear bilaterally without rales, wheezing or rhonchi  Heart: Regular rate and rhythm.   Abdomen: Bowel sounds present and normoactive. Soft. Nondistended. Nontender. + suprapubic cath  Extremities: No cyanosis or clubbing. No edema.   Skin: Warm. Dry. Good turgor. No rash. No vasculitic stigmata.  Psychiatric: Appropriate affect and mood for situation.         Laboratory & Imaging Data--  CBC                        11.0   6.96  )-----------( 211      ( 21 Dec 2024 06:44 )             34.5       Chemistries  12-21    129[L]  |  95[L]  |  97[H]  ----------------------------<  93  6.2[HH]   |  16[L]  |  7.21[H]    Ca    7.7[L]      21 Dec 2024 06:44  Phos  7.1     12-21  Mg     2.5     12-21    TPro  7.6  /  Alb  3.8  /  TBili  0.3  /  DBili  x   /  AST  12  /  ALT  9[L]  /  AlkPhos  108  12-20      Culture Data    Culture - Ear, Nose and Throat (ENT) (collected 20 Dec 2024 20:21)  Source: Ear  Gram Stain (21 Dec 2024 05:05):    No polymorphonuclear leukocytes seen    No organisms seen    by cytocentrifuge

## 2024-12-21 NOTE — PROGRESS NOTE ADULT - ASSESSMENT
74 year-old man with history of anemia of chronic dz, CKD4, Scr 4 at baseline, HTN, polio, wheelchair bound, DVT(RLE) 5/2024, BPH w LUTS, 2/2024 bladder outlet obstruction ( 2/2 neurogenic bladder) followed by suprapubic catheter placement complicated by rectal rupture requiring a diverting sigmoid loop colostomy reversal of which was done in November 2024 at another hospital  Ptn lives w a chronic suprapubic catheter   on 12/20  ptn presents  to the [facility] ER with worsening azotemia    Family states ever since the  colostomy reversal on 11/20/24.  he has not been himself.   His urine output has been reduced - 400cc/d rather then 2L/d. His urine has had a green tinge to it. He has had a sensation of suprapubic pain and fullness. Additionally, he has suffered from generalized weakness, muted voice, and loss of appetite for the past few weeks, and some nausea over the past few days. He has had moderate to severe back pain for the past several weeks; his family was worried this may be due to his kidneys; he suspects that it is due to having a poor mattress to sleep on. He underwent suprapubic catheter change on Monday 12/16/24; since then, his urine output has returned to his baseline of ~2L/day.     Bloodwork performed with his PCP Dr. Mcdowell on 11/29/24 revealed azotemia, ptn was sent by outptn nephrologist to the ED    Here Scr 7.51, pH7.22, HCO3 17, K 7.5, Na 129    Ptn seen by renal Dr. 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   He is uremic however no urgent need for HD   ongoing hyperkalemia. will start HD, vascular called to place [facility]    s/p suprapubic exchange 12/16/24.  start IVF w Bicarb drip, check BMP+Mg+PO4 at least daily  IV antiemetics prn nausea/vomiting  Urine: urinalysis, urine culture though may not be accurate 2/2 chronic catheter.   cont outptn meds  hold all nephrotoxic agents  DVT ppx w HSC  GI ppx w PPI

## 2024-12-21 NOTE — CONSULT NOTE ADULT - ATTENDING COMMENTS
Worsening CKD  Vascular consulted for HD catheter placement  After discussion w/ nephrology, plan to hold off HD at this time  Vascular available for HD catheter placement and if needed permanent HD access placement  Please protect LUE extremity in this patient with CKD

## 2024-12-21 NOTE — PROGRESS NOTE ADULT - SUBJECTIVE AND OBJECTIVE BOX
Overnight events noted      VITAL:  T(C): , Max: 37.1 (12-21-24 @ 07:02)  T(F): , Max: 98.7 (12-21-24 @ 07:02)  HR: 80 (12-21-24 @ 11:04)  BP: 145/66 (12-21-24 @ 11:04)  BP(mean): 103 (12-21-24 @ 07:35)  RR: 18 (12-21-24 @ 11:04)  SpO2: 98% (12-21-24 @ 11:04)      PHYSICAL EXAM:  Constitutional: NAD, Alert  HEENT: NCAT, DMM  Neck: Supple, No JVD  Respiratory: CTA-b/l  Cardiovascular: RRR s1s2, no m/r/g  Gastrointestinal: BS+, soft, NT/ND  : (+)legbag - yellow-green urine  Extremities: No peripheral edema b/l  Neurological: 0/5 strength of b/l LE  Back: no CVAT b/l  Skin: No rashes, no nevi    LABS:                        11.0   6.96  )-----------( 211      ( 21 Dec 2024 06:44 )             34.5     Na(129)/K(6.2)/Cl(95)/HCO3(16)/BUN(97)/Cr(7.21)Glu(93)/Ca(7.7)/Mg(2.5)/PO4(7.1)    12-21 @ 06:44  Na(131)/K(5.3)/Cl(95)/HCO3(20)/BUN(103)/Cr(6.67)Glu(102)/Ca(7.1)/Mg(--)/PO4(--)    12-20 @ 23:54  Na(132)/K(6.3)/Cl(98)/HCO3(15)/BUN(105)/Cr(7.38)Glu(128)/Ca(8.0)/Mg(--)/PO4(--)    12-20 @ 18:51  Na(129)/K(7.5)/Cl(94)/HCO3(17)/BUN(100)/Cr(7.51)Glu(122)/Ca(8.4)/Mg(--)/PO4(--)    12-20 @ 14:23      IMPRESSION: 74M w/ polio with associated neurogenic bladder/suprapubic catheter, HTN, and CKD4-5, 12/20/24 p/w uremia and hyperkalemia    (1)CKD - stage 5 - due to irreversible injury from obstructive uropathy,     (2)SONDRA - ATN induced either by prolonged obstruction in association with suboptimal suprapubic catheter function prior to catheter change 12/16. Uremic and hyperkalemic. It does not appear that his SONDRA is resolving...and therefore he is indicated for HD for management of his uremic symptoms. Moreover, his potassium level is rising back up, despite medical therapy for hyperkalemia. In light of this, he is highly indicated for HD.     (3)Hyperkalemia - severe/refractory to medical therapy alone. He needs dialysis        RECOMMEND:  (1)[facility]iley placement today with Vascular - Dr. Medina Almonte called for access  (2)HD tonight - HD staff informed  (3)HD tomorrow as well  (4)Continue IVF with HCO3 and continue IV diuretics for now; would d/c both therapies once HD is initiated  (5)BMP+Mg+PO4 at least daily  (6)HBV/HCV studies stat          Rafita Denny MD  [facility]  Office/on call physician: [phone]  Cell (7a-7p): [phone]       family at bedside  shares that he feels better than he did yesterday - more energy.      VITAL:  T(C): , Max: 37.1 (12-21-24 @ 07:02)  T(F): , Max: 98.7 (12-21-24 @ 07:02)  HR: 80 (12-21-24 @ 11:04)  BP: 145/66 (12-21-24 @ 11:04)  BP(mean): 103 (12-21-24 @ 07:35)  RR: 18 (12-21-24 @ 11:04)  SpO2: 98% (12-21-24 @ 11:04)      PHYSICAL EXAM:  Constitutional: NAD, Alert  HEENT: NCAT, DMM  Neck: Supple, No JVD  Respiratory: CTA-b/l  Cardiovascular: RRR s1s2, no m/r/g  Gastrointestinal: BS+, soft, NT/ND  : (+)mcconnell  Extremities: No peripheral edema b/l  Neurological: 0/5 strength of b/l LE  Back: no CVAT b/l  Skin: No rashes, no nevi    LABS:                        11.0   6.96  )-----------( 211      ( 21 Dec 2024 06:44 )             34.5     Na(129)/K(6.2)/Cl(95)/HCO3(16)/BUN(97)/Cr(7.21)Glu(93)/Ca(7.7)/Mg(2.5)/PO4(7.1)    12-21 @ 06:44  Na(131)/K(5.3)/Cl(95)/HCO3(20)/BUN(103)/Cr(6.67)Glu(102)/Ca(7.1)/Mg(--)/PO4(--)    12-20 @ 23:54  Na(132)/K(6.3)/Cl(98)/HCO3(15)/BUN(105)/Cr(7.38)Glu(128)/Ca(8.0)/Mg(--)/PO4(--)    12-20 @ 18:51  Na(129)/K(7.5)/Cl(94)/HCO3(17)/BUN(100)/Cr(7.51)Glu(122)/Ca(8.4)/Mg(--)/PO4(--)    12-20 @ 14:23      IMPRESSION: 74M w/ polio with associated neurogenic bladder/suprapubic catheter, HTN, and CKD4-5, 12/20/24 p/w uremia and hyperkalemia    (1)CKD - stage 5 - due to irreversible injury from obstructive uropathy,     (2)SONDRA - ATN induced either by prolonged obstruction in association with suboptimal suprapubic catheter function prior to catheter change 12/16. Uremic and hyperkalemic. It does not appear that his SONDRA is resolving...and therefore he is indicated for HD for management of his uremic symptoms. Moreover, his potassium level is rising back up, despite medical therapy for hyperkalemia. In light of this, he is highly indicated for HD.     (3)Hyperkalemia - severe/refractory to medical therapy alone. It certainly appears he will need dialysis. He would like to give us another day to see if his renal function and lytes will improve/to see if he can in fact avoid dialysis. We can continue to treat with medical therapy for today; we can plan for HD initiation tomorrow if his numbers are not significantly improving.    (4)Hyponatremia - mild - renally mediated - not overly concerning for now    (5)Hyperphosphatemia - renally mediated      RECOMMEND:  (1)Will hold off with shiley placement for now, potentially plan for shiley tomorrow if numbers not worsening/if patient is agreeable to HD  (2)HBV/HCV studies  (3)Continue IVF with HCO3 and continue IV diuretics for now as ordered  (4)BMP+Mg+PO4 at least q12h; Lokelma 10g po prn K 5.5 or higher; D50/insulin/IV calcium prn K 6.0 or higher  (5)Add Phoslo 667mg po tid with meals  (6)No HD just yet    counseled patient and family extensively/answered all questions to their satisfaction      Rafita Denny MD  [facility]  Office/on call physician: [phone]  Cell (7a-7p): [phone]

## 2024-12-21 NOTE — ED ADULT NURSE REASSESSMENT NOTE - NS ED NURSE REASSESS COMMENT FT1
Report received from RELL Jefferson. Pt is well-appearing and is in no acute distress and in hospital bed comfortably. Pt denies current chest pain, flank pain, SOB, n/v/d. VS as documented. Pt's suprapubic catheter has an output of 100 CC of clear, yellow urine. Pt presents at baseline mental status, AAOx4. Plan of care for admission and monitoring discussed with pt. Bed locked and lowered for safety. Safety and comfort maintained. Pt provided with meal at this time.

## 2024-12-21 NOTE — PATIENT PROFILE ADULT - SURGICAL SITE DESCRIPTION
Left Lower Quadrant incision. Unable to visualize at the moment due to dressing. Colostomy reversal procedure

## 2024-12-21 NOTE — CONSULT NOTE ADULT - ASSESSMENT
74 year-old man with  polio with associated neurogenic bladder/suprapubic catheter, hypertension, and chronic kidney disease with base creatinine 4-5mg/dL. He presented today to the [facility] ER with worsening azotemia, seen by renal in the ED  justin on ckd  left ear pain- ? infection     plan  otic cx gram stain neg  cx p  cont drops  will follow cx  doubt  would be helpful  pt cannot get contrast   unclear role of antibx or antifungals

## 2024-12-21 NOTE — CONSULT NOTE ADULT - ASSESSMENT
75yo M with Hx polio, neurogenic bladder (s/p suprapubic catheter), HTN, and CKD who presented with uremia and hyperkalemia c/f worsening azotemia. Vascular Surgery consulted for HD access.     PLAN:   - Plan to place [facility] for HD access    To be discussed with Dr. Grace Washburn, PGY-3  Matilda Castro, PGY-3  Vascular Surgery  #[phone]  75yo M with Hx polio, neurogenic bladder (s/p suprapubic catheter), HTN, and CKD who presented with uremia and hyperkalemia c/f worsening azotemia. Vascular Surgery consulted for potential HD access.     PLAN:   - No plans for urgent HD at this time  - Vascular Surgery team available to place HD access for emergent HD when needed    Discussed with Dr. Gage Washburn, PGY-3  Matilda Castro, PGY-3  Vascular Surgery  #[phone]  75yo M with Hx polio, neurogenic bladder (s/p suprapubic catheter), HTN, and CKD who presented with uremia and hyperkalemia c/f worsening azotemia. Vascular Surgery consulted for potential HD access.     PLAN:   - No plans for urgent HD at this time  - Vascular Surgery team available to place HD access for emergent HD when needed  - BUE vein mapping ordered for AVF planning    Discussed with vascular surgery fellow, Dr. Dubois, on behalf of Dr. Gage Washburn, PGY-3  Matilda Castro, PGY-3  Vascular Surgery  #[phone]  75yo M with Hx polio, neurogenic bladder (s/p suprapubic catheter), HTN, and CKD who presented with uremia and hyperkalemia c/f worsening azotemia. Vascular Surgery consulted for potential HD access.     PLAN:   - No plans for urgent HD at this time  - Vascular Surgery team available to place HD access for emergent HD when needed  - BUE vein mapping ordered for AVF planning  - LUE precautions - no blood draws, IVs, blood pressure cuffs     Discussed with vascular surgery fellow, Dr. Dubois, on behalf of Dr. Gage Washburn, PGY-3  Matilda Castro, PGY-3  Vascular Surgery  #[phone]  75yo M with Hx polio, neurogenic bladder (s/p suprapubic catheter), HTN, and CKD who presented with uremia and hyperkalemia c/f worsening azotemia. Vascular Surgery consulted for potential HD access.     PLAN:   - No plans for urgent HD at this time  - Vascular Surgery team available to place HD access for emergent HD when needed  - LUE precautions - no blood draws, IVs, blood pressure cuffs     Discussed with vascular surgery fellow, Dr. Dubois, on behalf of Dr. Gage Washburn, PGY-3  Matilda Castro, PGY-3  Vascular Surgery  #[phone]

## 2024-12-21 NOTE — PROVIDER CONTACT NOTE (OTHER) - RECOMMENDATIONS
Will continue to educate patient on importance of tele monitoring due to electrolyte imbalance and potential side effects.

## 2024-12-21 NOTE — CHART NOTE - NSCHARTNOTEFT_GEN_A_CORE
Patient noted to have a potassium of 6.2 and creatinine of 7.21 this morning   Discussed with nephrologist Dr. Denny; recommending lokelma 10 g, insulin and d50, increase bicarb gtt to 150 mEq/L at 75 mL/hr and starting bumex gtt at 1 mg/hr   Orders placed per recommendation; will monitor labs q6h for 12 hours   Medical attending, Dr. Juan Zamudio PA-C  Dept of Medicine
Patient was ordered for bicarb infusion at 16:36 for metabolic acidosis, though was not hung until 20:01. Pt seen and examined at bedside in NAD w/ bicarb infusion running. Will continue to monitor correction w/ BMP & VBG.     Homero West PA-C  Department of Medicine
Pt refusing to be placed on tele monitor. Pt, with wife at bedside, was educated at length for indications for tele monitoring. Pt voices understanding however continues to refuse at this time.  Attending Dr. Martinez made aware.    Alicia Ramirez PA-C  Dept. of Medicine

## 2024-12-21 NOTE — PROVIDER CONTACT NOTE (OTHER) - SITUATION
Refusing telemetry and skin assessment (backside). Pt states "my heart is just fine; no need for heart monitor"

## 2024-12-22 LAB
A1C WITH ESTIMATED AVERAGE GLUCOSE RESULT: 5.7 % — HIGH (ref 4–5.6)
ANION GAP SERPL CALC-SCNC: 18 MMOL/L — HIGH (ref 5–17)
BUN SERPL-MCNC: 93 MG/DL — HIGH (ref 7–23)
CALCIUM SERPL-MCNC: 7.9 MG/DL — LOW (ref 8.4–10.5)
CHLORIDE SERPL-SCNC: 87 MMOL/L — LOW (ref 96–108)
CO2 SERPL-SCNC: 23 MMOL/L — SIGNIFICANT CHANGE UP (ref 22–31)
CREAT SERPL-MCNC: 7.03 MG/DL — HIGH (ref 0.5–1.3)
EGFR: 8 ML/MIN/1.73M2 — LOW
ESTIMATED AVERAGE GLUCOSE: 117 MG/DL — HIGH (ref 68–114)
GLUCOSE SERPL-MCNC: 153 MG/DL — HIGH (ref 70–99)
GRAM STN FLD: ABNORMAL
HBV CORE AB SER-ACNC: SIGNIFICANT CHANGE UP
HBV SURFACE AB SER-ACNC: <3 MIU/ML — LOW
HCT VFR BLD CALC: 31 % — LOW (ref 39–50)
HGB BLD-MCNC: 10.1 G/DL — LOW (ref 13–17)
MCHC RBC-ENTMCNC: 28.5 PG — SIGNIFICANT CHANGE UP (ref 27–34)
MCHC RBC-ENTMCNC: 32.6 G/DL — SIGNIFICANT CHANGE UP (ref 32–36)
MCV RBC AUTO: 87.3 FL — SIGNIFICANT CHANGE UP (ref 80–100)
NRBC # BLD: 0 /100 WBCS — SIGNIFICANT CHANGE UP (ref 0–0)
PLATELET # BLD AUTO: 221 K/UL — SIGNIFICANT CHANGE UP (ref 150–400)
POTASSIUM SERPL-MCNC: 4.4 MMOL/L — SIGNIFICANT CHANGE UP (ref 3.5–5.3)
POTASSIUM SERPL-SCNC: 4.4 MMOL/L — SIGNIFICANT CHANGE UP (ref 3.5–5.3)
RBC # BLD: 3.55 M/UL — LOW (ref 4.2–5.8)
RBC # FLD: 13.4 % — SIGNIFICANT CHANGE UP (ref 10.3–14.5)
SODIUM SERPL-SCNC: 128 MMOL/L — LOW (ref 135–145)
WBC # BLD: 10.4 K/UL — SIGNIFICANT CHANGE UP (ref 3.8–10.5)
WBC # FLD AUTO: 10.4 K/UL — SIGNIFICANT CHANGE UP (ref 3.8–10.5)

## 2024-12-22 PROCEDURE — 99223 1ST HOSP IP/OBS HIGH 75: CPT

## 2024-12-22 RX ORDER — SODIUM BICARBONATE 84 MG/ML
1300 INJECTION, SOLUTION INTRAVENOUS THREE TIMES A DAY
Refills: 0 | Status: DISCONTINUED | OUTPATIENT
Start: 2024-12-22 | End: 2024-12-25

## 2024-12-22 RX ORDER — POLYETHYLENE GLYCOL 3350 17 G/DOSE
17 POWDER (GRAM) ORAL DAILY
Refills: 0 | Status: DISCONTINUED | OUTPATIENT
Start: 2024-12-22 | End: 2024-12-22

## 2024-12-22 RX ORDER — BENZONATATE 100 MG
100 CAPSULE ORAL EVERY 8 HOURS
Refills: 0 | Status: DISCONTINUED | OUTPATIENT
Start: 2024-12-22 | End: 2024-12-25

## 2024-12-22 RX ORDER — SODIUM ZIRCONIUM CYCLOSILICATE 10 G/10G
10 POWDER, FOR SUSPENSION ORAL DAILY
Refills: 0 | Status: DISCONTINUED | OUTPATIENT
Start: 2024-12-22 | End: 2024-12-25

## 2024-12-22 RX ORDER — BUMETANIDE 2 MG/1
2 TABLET ORAL
Refills: 0 | Status: DISCONTINUED | OUTPATIENT
Start: 2024-12-22 | End: 2024-12-23

## 2024-12-22 RX ORDER — SODIUM BICARBONATE 84 MG/ML
1300 INJECTION, SOLUTION INTRAVENOUS
Refills: 0 | Status: DISCONTINUED | OUTPATIENT
Start: 2024-12-22 | End: 2024-12-22

## 2024-12-22 RX ADMIN — CALCIUM ACETATE 667 MILLIGRAM(S): 667 CAPSULE ORAL at 09:09

## 2024-12-22 RX ADMIN — ACETAMINOPHEN 650 MILLIGRAM(S): 80 SOLUTION/ DROPS ORAL at 04:10

## 2024-12-22 RX ADMIN — PANTOPRAZOLE 40 MILLIGRAM(S): 40 TABLET, DELAYED RELEASE ORAL at 05:53

## 2024-12-22 RX ADMIN — Medication 10 MILLIGRAM(S): at 21:08

## 2024-12-22 RX ADMIN — ATORVASTATIN CALCIUM 40 MILLIGRAM(S): 40 TABLET, FILM COATED ORAL at 21:08

## 2024-12-22 RX ADMIN — ACETAMINOPHEN 650 MILLIGRAM(S): 80 SOLUTION/ DROPS ORAL at 04:58

## 2024-12-22 RX ADMIN — ACETIC ACID 4 DROP(S): 20 SOLUTION AURICULAR (OTIC) at 12:50

## 2024-12-22 RX ADMIN — Medication 10 MILLIGRAM(S): at 22:08

## 2024-12-22 RX ADMIN — SODIUM BICARBONATE 1300 MILLIGRAM(S): 84 INJECTION, SOLUTION INTRAVENOUS at 17:56

## 2024-12-22 RX ADMIN — SODIUM BICARBONATE 1300 MILLIGRAM(S): 84 INJECTION, SOLUTION INTRAVENOUS at 21:08

## 2024-12-22 RX ADMIN — ACETIC ACID 4 DROP(S): 20 SOLUTION AURICULAR (OTIC) at 17:58

## 2024-12-22 RX ADMIN — BUMETANIDE 2 MILLIGRAM(S): 2 TABLET ORAL at 17:57

## 2024-12-22 RX ADMIN — CALCIUM ACETATE 667 MILLIGRAM(S): 667 CAPSULE ORAL at 12:49

## 2024-12-22 RX ADMIN — ACETIC ACID 4 DROP(S): 20 SOLUTION AURICULAR (OTIC) at 05:53

## 2024-12-22 RX ADMIN — HEPARIN SODIUM 5000 UNIT(S): 1000 INJECTION, SOLUTION INTRAVENOUS; SUBCUTANEOUS at 05:53

## 2024-12-22 RX ADMIN — CALCIUM ACETATE 667 MILLIGRAM(S): 667 CAPSULE ORAL at 17:57

## 2024-12-22 RX ADMIN — Medication 5 MILLIGRAM(S): at 05:53

## 2024-12-22 NOTE — PROGRESS NOTE ADULT - ASSESSMENT
74 year-old man with history of anemia of chronic dz, CKD4, Scr 4 at baseline, HTN, polio, wheelchair bound, DVT(RLE) 5/2024, BPH w LUTS, 2/2024 bladder outlet obstruction ( 2/2 neurogenic bladder) followed by suprapubic catheter placement complicated by rectal rupture requiring a diverting sigmoid loop colostomy reversal of which was done in November 2024 at another hospital  Ptn lives w a chronic suprapubic catheter   on 12/20  ptn presents  to the [facility] ER with worsening azotemia    Family states ever since the  colostomy reversal on 11/20/24.  he has not been himself.   His urine output has been reduced - 400cc/d rather then 2L/d. His urine has had a green tinge to it. He has had a sensation of suprapubic pain and fullness. Additionally, he has suffered from generalized weakness, muted voice, and loss of appetite for the past few weeks, and some nausea over the past few days. He has had moderate to severe back pain for the past several weeks; his family was worried this may be due to his kidneys; he suspects that it is due to having a poor mattress to sleep on. He underwent suprapubic catheter change on Monday 12/16/24; since then, his urine output has returned to his baseline of ~2L/day.     Bloodwork performed with his PCP Dr. Mcdowell on 11/29/24 revealed azotemia, ptn was sent by outptn nephrologist to the ED    Here Scr 7.51, pH7.22, HCO3 17, K 7.5, Na 129    Ptn seen by renal Dr. 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   He is uremic however no urgent need for HD   ongoing hyperkalemia. will start HD, vascular called to place [facility]    s/p suprapubic exchange 12/16/24.  feels better today exc Left ear still with pain and fullness, ENT to F/U, cont acetic acid drops  K 4.4, transition from BUMEX drip to BUMEX 2 mg IV q12H, daily LOKELMA, no need for HD as per renal, on po Bicarb,   IV antiemetics prn nausea/vomiting  Urine: urinalysis, urine culture though may not be accurate 2/2 chronic catheter.   cont outptn meds  hold all nephrotoxic agents  DVT ppx w HSC  GI ppx w PPI

## 2024-12-22 NOTE — PROGRESS NOTE ADULT - SUBJECTIVE AND OBJECTIVE BOX
Patient is a 74y old  Male who presents with a chief complaint of     SUBJECTIVE / OVERNIGHT EVENTS: K 4.4, transition from BUMEX drip to BUMEX 2 mg IV q12H, daily LOKELMA, no need for HD as per renal, on po Bicarb, Left ear still with pain and fullness, ENT to F/U, cont acetic acid drops    MEDICATIONS  (STANDING):  acetic acid 2% Solution 4 Drop(s) Left Ear every 6 hours  amLODIPine   Tablet 5 milliGRAM(s) Oral daily  atorvastatin 40 milliGRAM(s) Oral at bedtime  buMETAnide Injectable 2 milliGRAM(s) IV Push two times a day  calcium acetate 667 milliGRAM(s) Oral three times a day with meals  heparin   Injectable 5000 Unit(s) SubCutaneous every 12 hours  influenza  Vaccine (HIGH DOSE) 0.5 milliLiter(s) IntraMuscular once  pantoprazole    Tablet 40 milliGRAM(s) Oral before breakfast  sodium bicarbonate 1300 milliGRAM(s) Oral three times a day  sodium zirconium cyclosilicate 10 Gram(s) Oral daily    MEDICATIONS  (PRN):  acetaminophen     Tablet .. 650 milliGRAM(s) Oral every 6 hours PRN Temp greater or equal to 38C (100.4F), Mild Pain (1 - 3)  benzonatate 100 milliGRAM(s) Oral every 8 hours PRN Cough  ondansetron Injectable 4 milliGRAM(s) IV Push every 6 hours PRN Nausea and/or Vomiting  oxyCODONE    IR 10 milliGRAM(s) Oral every 6 hours PRN for moderate pain  zolpidem 5 milliGRAM(s) Oral at bedtime PRN Insomnia      Vital Signs Last 24 Hrs  T(F): 97.7 (12-22-24 @ 04:06), Max: 97.8 (12-21-24 @ 21:24)  HR: 88 (12-22-24 @ 04:06) (88 - 88)  BP: 157/74 (12-22-24 @ 04:06) (143/78 - 157/74)  RR: 18 (12-22-24 @ 04:06) (18 - 19)  SpO2: 98% (12-22-24 @ 04:06) (97% - 98%)  Telemetry:   CAPILLARY BLOOD GLUCOSE        I&O's Summary    21 Dec 2024 07:01  -  22 Dec 2024 07:00  --------------------------------------------------------  IN: 0 mL / OUT: 400 mL / NET: -400 mL        PHYSICAL EXAM:  GENERAL: NAD, well-developed  HEAD:  Atraumatic, Normocephalic  EYES: EOMI, PERRLA, conjunctiva and sclera clear  NECK: Supple, No JVD  CHEST/LUNG: Clear to auscultation bilaterally; No wheeze  HEART: Regular rate and rhythm; No murmurs, rubs, or gallops  ABDOMEN: Soft, Nontender, Nondistended; Bowel sounds present  EXTREMITIES:  2+ Peripheral Pulses, No clubbing, cyanosis, or edema  PSYCH: AAOx3  NEUROLOGY: non-focal  SKIN: No rashes or lesions    LABS:                        10.1   10.40 )-----------( 221      ( 22 Dec 2024 06:52 )             31.0     12-22    128[L]  |  87[L]  |  93[H]  ----------------------------<  153[H]  4.4   |  23  |  7.03[H]    Ca    7.9[L]      22 Dec 2024 10:56  Phos  7.1     12-21  Mg     2.5     12-21      PT/INR - ( 21 Dec 2024 18:12 )   PT: 13.2 sec;   INR: 1.15 ratio         PTT - ( 21 Dec 2024 18:12 )  PTT:33.1 sec      Urinalysis Basic - ( 22 Dec 2024 10:56 )    Color: x / Appearance: x / SG: x / pH: x  Gluc: 153 mg/dL / Ketone: x  / Bili: x / Urobili: x   Blood: x / Protein: x / Nitrite: x   Leuk Esterase: x / RBC: x / WBC x   Sq Epi: x / Non Sq Epi: x / Bacteria: x        RADIOLOGY & ADDITIONAL TESTS:    Imaging Personally Reviewed:    Consultant(s) Notes Reviewed:      Care Discussed with Consultants/Other Providers:

## 2024-12-22 NOTE — PROGRESS NOTE ADULT - SUBJECTIVE AND OBJECTIVE BOX
Overnight events noted      VITAL:  T(C): , Max: 36.6 (12-21-24 @ 21:24)  T(F): , Max: 97.8 (12-21-24 @ 21:24)  HR: 88 (12-22-24 @ 04:06)  BP: 157/74 (12-22-24 @ 04:06)  BP(mean): --  RR: 18 (12-22-24 @ 04:06)  SpO2: 98% (12-22-24 @ 04:06)  Wt(kg): --      PHYSICAL EXAM:  Constitutional: NAD, Alert  HEENT: NCAT, DMM  Neck: Supple, No JVD  Respiratory: CTA-b/l  Cardiovascular: RRR s1s2, no m/r/g  Gastrointestinal: BS+, soft, NT/ND  : (+)mcconnell  Extremities: No peripheral edema b/l  Neurological: 0/5 strength of b/l LE  Back: no CVAT b/l  Skin: No rashes, no nevi    LABS:                        10.1   10.40 )-----------( 221      ( 22 Dec 2024 06:52 )             31.0     Na(128)/K(4.4)/Cl(87)/HCO3(23)/BUN(93)/Cr(7.03)Glu(153)/Ca(7.9)/Mg(--)/PO4(--)    12-22 @ 10:56  Na(128)/K(5.5)/Cl(91)/HCO3(18)/BUN(105)/Cr(7.06)Glu(133)/Ca(8.0)/Mg(--)/PO4(--)    12-21 @ 18:12  Na(129)/K(6.2)/Cl(95)/HCO3(16)/BUN(97)/Cr(7.21)Glu(93)/Ca(7.7)/Mg(2.5)/PO4(7.1)    12-21 @ 06:44  Na(131)/K(5.3)/Cl(95)/HCO3(20)/BUN(103)/Cr(6.67)Glu(102)/Ca(7.1)/Mg(--)/PO4(--)    12-20 @ 23:54  Na(132)/K(6.3)/Cl(98)/HCO3(15)/BUN(105)/Cr(7.38)Glu(128)/Ca(8.0)/Mg(--)/PO4(--)    12-20 @ 18:51  Na(129)/K(7.5)/Cl(94)/HCO3(17)/BUN(100)/Cr(7.51)Glu(122)/Ca(8.4)/Mg(--)/PO4(--)    12-20 @ 14:23      IMPRESSION: 74M w/ polio with associated neurogenic bladder/suprapubic catheter, HTN, and CKD4-5, 12/20/24 p/w uremia and hyperkalemia    (1)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     (3)Hyperkalemia - improved as of today, with medical therapy. We should cut back on the IVF and the IV Lasix and eventually see if he can maintain acceptable electrolytes off IV therapies. If not, he will require initiation of HD this admission.     (4)Hyponatremia - mild - renally mediated - placement on PO NaHCO3 should help both with hyponatremia and acidosis.    (5)Hyperphosphatemia - renally mediated      RECOMMEND:  (1)Switch from Bumex gtt over to Bumex 2mg iv bid  (2)D/C IV NaHCO3 and substitute in NaHCO3 1300mg po tid  (3)Phoslo as ordered  (4)Lokelma 10gm po daily  (5)BMP+Mg+PO4 at least qd  (6)No shiley yet/No HD just yet              Rafita Denny MD  [facility]  Office/on call physician: [phone]  Cell (7a-7p): [phone]       Feeling better today than yesterday  No pain, no sob      VITAL:  T(C): , Max: 36.6 (12-21-24 @ 21:24)  T(F): , Max: 97.8 (12-21-24 @ 21:24)  HR: 88 (12-22-24 @ 04:06)  BP: 157/74 (12-22-24 @ 04:06)  BP(mean): --  RR: 18 (12-22-24 @ 04:06)  SpO2: 98% (12-22-24 @ 04:06)  Wt(kg): --      PHYSICAL EXAM:  Constitutional: NAD, Alert  HEENT: NCAT, DMM  Neck: Supple, No JVD  Respiratory: CTA-b/l  Cardiovascular: RRR s1s2, no m/r/g  Gastrointestinal: BS+, soft, NT/ND  : (+)mcconnell  Extremities: No peripheral edema b/l  Neurological: 0/5 strength of b/l LE  Back: no CVAT b/l  Skin: No rashes, no nevi    LABS:                        10.1   10.40 )-----------( 221      ( 22 Dec 2024 06:52 )             31.0     Na(128)/K(4.4)/Cl(87)/HCO3(23)/BUN(93)/Cr(7.03)Glu(153)/Ca(7.9)/Mg(--)/PO4(--)    12-22 @ 10:56  Na(128)/K(5.5)/Cl(91)/HCO3(18)/BUN(105)/Cr(7.06)Glu(133)/Ca(8.0)/Mg(--)/PO4(--)    12-21 @ 18:12  Na(129)/K(6.2)/Cl(95)/HCO3(16)/BUN(97)/Cr(7.21)Glu(93)/Ca(7.7)/Mg(2.5)/PO4(7.1)    12-21 @ 06:44  Na(131)/K(5.3)/Cl(95)/HCO3(20)/BUN(103)/Cr(6.67)Glu(102)/Ca(7.1)/Mg(--)/PO4(--)    12-20 @ 23:54  Na(132)/K(6.3)/Cl(98)/HCO3(15)/BUN(105)/Cr(7.38)Glu(128)/Ca(8.0)/Mg(--)/PO4(--)    12-20 @ 18:51  Na(129)/K(7.5)/Cl(94)/HCO3(17)/BUN(100)/Cr(7.51)Glu(122)/Ca(8.4)/Mg(--)/PO4(--)    12-20 @ 14:23      IMPRESSION: 74M w/ polio with associated neurogenic bladder/suprapubic catheter, HTN, and CKD4-5, 12/20/24 p/w uremia and hyperkalemia    (1)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     (3)Hyperkalemia - improved as of today, with medical therapy. We should cut back on the IVF and the IV Lasix and eventually see if he can maintain acceptable electrolytes off IV therapies. If not, he will require initiation of HD this admission.     (4)Hyponatremia - mild - renally mediated - placement on PO NaHCO3 should help both with hyponatremia and acidosis.    (5)Hyperphosphatemia - renally mediated      RECOMMEND:  (1)Switch from Bumex gtt over to Bumex 2mg iv bid  (2)D/C IV NaHCO3 and substitute in NaHCO3 1300mg po tid  (3)Phoslo as ordered  (4)Lokelma 10gm po daily  (5)BMP+Mg+PO4 at least qd  (6)No shiley yet/No HD just yet              Rafita Denny MD  [facility]  Office/on call physician: [phone]  Cell (7a-7p): [phone]

## 2024-12-22 NOTE — PROGRESS NOTE ADULT - SUBJECTIVE AND OBJECTIVE BOX
[facility], Division of Infectious   Dr Davis, Dr Eden, Dr Bailey, HAYDEN Zheng Julio César  [phone]  after hours and weekends [phone]    Name: BRINA DEMPSEY  Age: 74y  Gender: Male  MRN: [id number]    Interval History--  Notes reviewed  still with ear pain  pt feels like there is something in his ear         Allergies    No Known Allergies    Intolerances        Medications--  Antibiotics:    Immunologic:  influenza  Vaccine (HIGH DOSE) 0.5 milliLiter(s) IntraMuscular once    Other:  acetaminophen     Tablet .. PRN  acetic acid 2% Solution  amLODIPine   Tablet  atorvastatin  buMETAnide Injectable  calcium acetate  heparin   Injectable  ondansetron Injectable PRN  oxyCODONE    IR PRN  pantoprazole    Tablet  sodium bicarbonate  sodium zirconium cyclosilicate  zolpidem PRN      Review of Systems--  A 10-point review of systems was obtained.     Pertinent positives and negatives--  Constitutional: No fevers. No Chills. No Rigors.   Cardiovascular: No chest pain. No palpitations.  Respiratory: No shortness of breath. No cough.  Gastrointestinal: No nausea or vomiting. No diarrhea or constipation.   Psychiatric: Pleasant. Appropriate affect.    Review of systems otherwise negative except as previously noted.    Physical Examination--  Vital Signs: T(F): 97.7 (12-22-24 @ 04:06), Max: 97.8 (12-21-24 @ 21:24)  HR: 88 (12-22-24 @ 04:06)  BP: 157/74 (12-22-24 @ 04:06)  RR: 18 (12-22-24 @ 04:06)  SpO2: 98% (12-22-24 @ 04:06)  Wt(kg): --  General: Nontoxic-appearing Male in no acute distress.  HEENT: AT/NC. left ear no erythema, not edema no drainage  Heart: Regular rate and rhythm  Abdomen:supra pubic cath  Extremities: No cyanosis or clubbing. trace edema.   Skin: Warm. Dry. Good turgor. No rash. No vasculitic stigmata.  Psychiatric: Appropriate affect and mood for situation.         Laboratory Studies--  CBC                        10.1   10.40 )-----------( 221      ( 22 Dec 2024 06:52 )             31.0       Chemistries  12-22    128[L]  |  87[L]  |  93[H]  ----------------------------<  153[H]  4.4   |  23  |  7.03[H]    Ca    7.9[L]      22 Dec 2024 10:56  Phos  7.1     12-21  Mg     2.5     12-21        Culture Data    Culture - Fungal, Other (collected 20 Dec 2024 20:22)  Source: .Other  Preliminary Report (22 Dec 2024 10:35):    Testing in progress    Culture - Ear, Nose and Throat (ENT) (collected 20 Dec 2024 20:21)  Source: Ear  Gram Stain (22 Dec 2024 10:27):    No polymorphonuclear leukocytes seen    No organisms seen    by cytocentrifuge  Preliminary Report (22 Dec 2024 10:27):    Numerous Mold Like Fungus

## 2024-12-22 NOTE — PROGRESS NOTE ADULT - ASSESSMENT
74 year-old man with  polio with associated neurogenic bladder/suprapubic catheter, hypertension, and chronic kidney disease with base creatinine 4-5mg/dL. He presented today to the [facility] ER with worsening azotemia, seen by renal in the ED  justin on ckd  left ear pain-  otomycosis     ear cx mold/ fungus     plan  cont acetic acid drops as compared to topical antifungals have similar efficacy   will likely need ent follow up and further cleaning of left ear   unclear role for oral antifungals  will follow for cx ID

## 2024-12-23 ENCOUNTER — TRANSCRIPTION ENCOUNTER (OUTPATIENT)
Age: 74
End: 2024-12-23

## 2024-12-23 LAB
ANION GAP SERPL CALC-SCNC: 18 MMOL/L — HIGH (ref 5–17)
BUN SERPL-MCNC: 103 MG/DL — HIGH (ref 7–23)
CALCIUM SERPL-MCNC: 7.8 MG/DL — LOW (ref 8.4–10.5)
CHLORIDE SERPL-SCNC: 88 MMOL/L — LOW (ref 96–108)
CO2 SERPL-SCNC: 25 MMOL/L — SIGNIFICANT CHANGE UP (ref 22–31)
CREAT SERPL-MCNC: 6.93 MG/DL — HIGH (ref 0.5–1.3)
EGFR: 8 ML/MIN/1.73M2 — LOW
GLUCOSE BLDC GLUCOMTR-MCNC: 137 MG/DL — HIGH (ref 70–99)
GLUCOSE SERPL-MCNC: 102 MG/DL — HIGH (ref 70–99)
HCT VFR BLD CALC: 32.6 % — LOW (ref 39–50)
HGB BLD-MCNC: 10.7 G/DL — LOW (ref 13–17)
MCHC RBC-ENTMCNC: 28.6 PG — SIGNIFICANT CHANGE UP (ref 27–34)
MCHC RBC-ENTMCNC: 32.8 G/DL — SIGNIFICANT CHANGE UP (ref 32–36)
MCV RBC AUTO: 87.2 FL — SIGNIFICANT CHANGE UP (ref 80–100)
NRBC # BLD: 0 /100 WBCS — SIGNIFICANT CHANGE UP (ref 0–0)
PLATELET # BLD AUTO: 234 K/UL — SIGNIFICANT CHANGE UP (ref 150–400)
POTASSIUM SERPL-MCNC: 4.2 MMOL/L — SIGNIFICANT CHANGE UP (ref 3.5–5.3)
POTASSIUM SERPL-SCNC: 4.2 MMOL/L — SIGNIFICANT CHANGE UP (ref 3.5–5.3)
RBC # BLD: 3.74 M/UL — LOW (ref 4.2–5.8)
RBC # FLD: 13.2 % — SIGNIFICANT CHANGE UP (ref 10.3–14.5)
SODIUM SERPL-SCNC: 131 MMOL/L — LOW (ref 135–145)
WBC # BLD: 8.86 K/UL — SIGNIFICANT CHANGE UP (ref 3.8–10.5)
WBC # FLD AUTO: 8.86 K/UL — SIGNIFICANT CHANGE UP (ref 3.8–10.5)

## 2024-12-23 PROCEDURE — 99232 SBSQ HOSP IP/OBS MODERATE 35: CPT

## 2024-12-23 RX ORDER — FUROSEMIDE 20 MG
80 TABLET ORAL DAILY
Refills: 0 | Status: DISCONTINUED | OUTPATIENT
Start: 2024-12-24 | End: 2024-12-25

## 2024-12-23 RX ADMIN — Medication 5 MILLIGRAM(S): at 05:06

## 2024-12-23 RX ADMIN — ACETIC ACID 4 DROP(S): 20 SOLUTION AURICULAR (OTIC) at 17:05

## 2024-12-23 RX ADMIN — ATORVASTATIN CALCIUM 40 MILLIGRAM(S): 40 TABLET, FILM COATED ORAL at 22:35

## 2024-12-23 RX ADMIN — CALCIUM ACETATE 667 MILLIGRAM(S): 667 CAPSULE ORAL at 17:04

## 2024-12-23 RX ADMIN — HEPARIN SODIUM 5000 UNIT(S): 1000 INJECTION, SOLUTION INTRAVENOUS; SUBCUTANEOUS at 05:07

## 2024-12-23 RX ADMIN — ACETIC ACID 4 DROP(S): 20 SOLUTION AURICULAR (OTIC) at 12:14

## 2024-12-23 RX ADMIN — HEPARIN SODIUM 5000 UNIT(S): 1000 INJECTION, SOLUTION INTRAVENOUS; SUBCUTANEOUS at 17:04

## 2024-12-23 RX ADMIN — SODIUM BICARBONATE 1300 MILLIGRAM(S): 84 INJECTION, SOLUTION INTRAVENOUS at 05:06

## 2024-12-23 RX ADMIN — Medication 10 MILLIGRAM(S): at 22:34

## 2024-12-23 RX ADMIN — SODIUM ZIRCONIUM CYCLOSILICATE 10 GRAM(S): 10 POWDER, FOR SUSPENSION ORAL at 12:13

## 2024-12-23 RX ADMIN — BUMETANIDE 2 MILLIGRAM(S): 2 TABLET ORAL at 13:38

## 2024-12-23 RX ADMIN — ACETIC ACID 4 DROP(S): 20 SOLUTION AURICULAR (OTIC) at 00:30

## 2024-12-23 RX ADMIN — PANTOPRAZOLE 40 MILLIGRAM(S): 40 TABLET, DELAYED RELEASE ORAL at 05:06

## 2024-12-23 RX ADMIN — SODIUM BICARBONATE 1300 MILLIGRAM(S): 84 INJECTION, SOLUTION INTRAVENOUS at 13:38

## 2024-12-23 RX ADMIN — SODIUM BICARBONATE 1300 MILLIGRAM(S): 84 INJECTION, SOLUTION INTRAVENOUS at 22:35

## 2024-12-23 RX ADMIN — CALCIUM ACETATE 667 MILLIGRAM(S): 667 CAPSULE ORAL at 12:13

## 2024-12-23 RX ADMIN — BUMETANIDE 2 MILLIGRAM(S): 2 TABLET ORAL at 05:07

## 2024-12-23 RX ADMIN — CALCIUM ACETATE 667 MILLIGRAM(S): 667 CAPSULE ORAL at 08:47

## 2024-12-23 RX ADMIN — ACETIC ACID 4 DROP(S): 20 SOLUTION AURICULAR (OTIC) at 05:06

## 2024-12-23 NOTE — PROGRESS NOTE ADULT - ASSESSMENT
Christel Hsu is a 77 y.o. female (: 1941) presenting to address:    Chief Complaint   Patient presents with    Arthritis    Medication Refill    Diabetes    Hypertension       Vitals:    18 1105   BP: 120/70   Pulse: 64   Resp: 17   Temp: 98.1 °F (36.7 °C)   TempSrc: Oral   SpO2: 98%   Weight: 189 lb (85.7 kg)   Height: 5' 7\" (1.702 m)   PainSc:   7   PainLoc: Generalized       Learning Assessment:     Learning Assessment 2/3/2015   PRIMARY LEARNER Patient   HIGHEST LEVEL OF EDUCATION - PRIMARY LEARNER  GRADUATED HIGH SCHOOL OR GED   BARRIERS PRIMARY LEARNER NONE   CO-LEARNER CAREGIVER No   PRIMARY LANGUAGE ENGLISH   LEARNER PREFERENCE PRIMARY READING   ANSWERED BY self   RELATIONSHIP SELF     Depression Screening:     PHQ over the last two weeks 2018   Little interest or pleasure in doing things Not at all   Feeling down, depressed or hopeless Not at all   Total Score PHQ 2 0     Fall Risk Assessment:     Fall Risk Assessment, last 12 mths 2018   Able to walk? Yes   Fall in past 12 months? No     Abuse Screening:     Abuse Screening Questionnaire 2018   Do you ever feel afraid of your partner? N   Are you in a relationship with someone who physically or mentally threatens you? N   Is it safe for you to go home? Y     Coordination of Care Questionaire:   1. Have you been to the ER, urgent care clinic since your last visit? Hospitalized since your last visit? NO    2. Have you seen or consulted any other health care providers outside of the [address] [place] [address] since your last visit? Include any pap smears or colon screening. YES. Pain management, breast specialist    Advanced Directive:   1. Do you have an Advanced Directive? NO    2. Would you like information on Advanced Directives?  YES 74 year-old man with  polio with associated neurogenic bladder/suprapubic catheter, hypertension, and chronic kidney disease with base creatinine 4-5mg/dL. He presented today to the [facility] ER with worsening azotemia, seen by renal in the ED  justin on ckd  left ear pain-  otomycosis     ear cx mold/ fungus     plan  cont acetic acid drops as compared to topical antifungals have similar efficacy   will likely need ent follow up and further cleaning of left ear   unclear role for oral antifungals  will follow for cx ID

## 2024-12-23 NOTE — PROGRESS NOTE ADULT - SUBJECTIVE AND OBJECTIVE BOX
[facility] DIV[facility] OF INFECTIOUS DISEASES  SABINO Griffin Y. Patel, S. Shah, G. Casimir  [phone]  ([phone] - weekdays after 5pm and weekends)    Name: BRIAN DEMPSEY  Age/Gender: 74y Male  MRN: [id number]    Interval History:  Patient seen and examined this morning.   Resting comfortably.   Notes reviewed  No concerning overnight events  Afebrile   Allergies: No Known Allergies      Objective:  Vitals:   T(F): 97.6 (12-23-24 @ 04:04), Max: 97.6 (12-22-24 @ 21:54)  HR: 73 (12-23-24 @ 04:04) (73 - 73)  BP: 120/76 (12-23-24 @ 04:04) (120/76 - 131/69)  RR: 18 (12-23-24 @ 04:04) (18 - 18)  SpO2: 98% (12-23-24 @ 04:04) (93% - 98%)  Physical Examination:  General: no acute distress, nontoxic appearing   HEENT: normocephalic, atraumatic   Respiratory: no acc muscle use, breathing comfortably  Cardiovascular: S1 and S2 present  Gastrointestinal: normal appearing, nondistended  Extremities: no cyanosis  Skin: no visible rash    Laboratory Studies:  CBC:                       10.7   8.86  )-----------( 234      ( 23 Dec 2024 06:20 )             32.6     WBC Trend:  8.86 12-23-24 @ 06:20  10.40 12-22-24 @ 06:52  6.96 12-21-24 @ 06:44  9.13 12-20-24 @ 14:23    CMP: 12-23    131[L]  |  88[L]  |  103[H]  ----------------------------<  102[H]  4.2   |  25  |  6.93[H]    Ca    7.8[L]      23 Dec 2024 06:20      Creatinine: 6.93 mg/dL (12-23-24 @ 06:20)  Creatinine: 7.03 mg/dL (12-22-24 @ 10:56)  Creatinine: 7.06 mg/dL (12-21-24 @ 18:12)  Creatinine: 7.21 mg/dL (12-21-24 @ 06:44)  Creatinine: 6.67 mg/dL (12-20-24 @ 23:54)  Creatinine: 7.38 mg/dL (12-20-24 @ 18:51)  Creatinine: 7.51 mg/dL (12-20-24 @ 14:23)    Microbiology: reviewed   Culture - Fungal, Other (collected 12-20-24 @ 20:22)  Source: .Other  Preliminary Report (12-23-24 @ 09:59):    Moderate Mold Like Fungus    Culture - Ear, Nose and Throat (ENT) (collected 12-20-24 @ 20:21)  Source: Ear  Gram Stain (12-22-24 @ 10:27):    No polymorphonuclear leukocytes seen    No organisms seen    by cytocentrifuge  Preliminary Report (12-22-24 @ 10:27):    Numerous Mold Like Fungus    Radiology: reviewed     Medications:  acetaminophen     Tablet .. 650 milliGRAM(s) Oral every 6 hours PRN  acetic acid 2% Solution 4 Drop(s) Left Ear every 6 hours  amLODIPine   Tablet 5 milliGRAM(s) Oral daily  atorvastatin 40 milliGRAM(s) Oral at bedtime  benzonatate 100 milliGRAM(s) Oral every 8 hours PRN  buMETAnide Injectable 2 milliGRAM(s) IV Push two times a day  calcium acetate 667 milliGRAM(s) Oral three times a day with meals  heparin   Injectable 5000 Unit(s) SubCutaneous every 12 hours  influenza  Vaccine (HIGH DOSE) 0.5 milliLiter(s) IntraMuscular once  ondansetron Injectable 4 milliGRAM(s) IV Push every 6 hours PRN  oxyCODONE    IR 10 milliGRAM(s) Oral every 6 hours PRN  pantoprazole    Tablet 40 milliGRAM(s) Oral before breakfast  sodium bicarbonate 1300 milliGRAM(s) Oral three times a day  sodium zirconium cyclosilicate 10 Gram(s) Oral daily  zolpidem 5 milliGRAM(s) Oral at bedtime PRN

## 2024-12-23 NOTE — PROGRESS NOTE ADULT - SUBJECTIVE AND OBJECTIVE BOX
Overnight events noted      VITAL:  T(C): , Max: 36.4 (12-22-24 @ 21:54)  T(F): , Max: 97.6 (12-22-24 @ 21:54)  HR: 73 (12-23-24 @ 04:04)  BP: 120/76 (12-23-24 @ 04:04)  BP(mean): --  RR: 18 (12-23-24 @ 04:04)  SpO2: 98% (12-23-24 @ 04:04)  Wt(kg): --      PHYSICAL EXAM:  Constitutional: NAD, Alert  HEENT: NCAT, DMM  Neck: Supple, No JVD  Respiratory: CTA-b/l  Cardiovascular: RRR s1s2, no m/r/g  Gastrointestinal: BS+, soft, NT/ND  : (+)mcconnell  Extremities: No peripheral edema b/l  Neurological: 0/5 strength of b/l LE  Back: no CVAT b/l  Skin: No rashes, no nevi      LABS:                        10.7   8.86  )-----------( 234      ( 23 Dec 2024 06:20 )             32.6     Na(131)/K(4.2)/Cl(88)/HCO3(25)/BUN(103)/Cr(6.93)Glu(102)/Ca(7.8)/Mg(--)/PO4(--)    12-23 @ 06:20  Na(128)/K(4.4)/Cl(87)/HCO3(23)/BUN(93)/Cr(7.03)Glu(153)/Ca(7.9)/Mg(--)/PO4(--)    12-22 @ 10:56  Na(128)/K(5.5)/Cl(91)/HCO3(18)/BUN(105)/Cr(7.06)Glu(133)/Ca(8.0)/Mg(--)/PO4(--)    12-21 @ 18:12  Na(129)/K(6.2)/Cl(95)/HCO3(16)/BUN(97)/Cr(7.21)Glu(93)/Ca(7.7)/Mg(2.5)/PO4(7.1)    12-21 @ 06:44  Na(131)/K(5.3)/Cl(95)/HCO3(20)/BUN(103)/Cr(6.67)Glu(102)/Ca(7.1)/Mg(--)/PO4(--)    12-20 @ 23:54  Na(132)/K(6.3)/Cl(98)/HCO3(15)/BUN(105)/Cr(7.38)Glu(128)/Ca(8.0)/Mg(--)/PO4(--)    12-20 @ 18:51  Na(129)/K(7.5)/Cl(94)/HCO3(17)/BUN(100)/Cr(7.51)Glu(122)/Ca(8.4)/Mg(--)/PO4(--)    12-20 @ 14:23      IMPRESSION: 74M w/ polio with associated neurogenic bladder/suprapubic catheter, HTN, and CKD4-5, 12/20/24 p/w uremia and hyperkalemia    (1)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 Plateaued numbers/potentially slowly starting to improve. No longer with significant uremic symptoms; therefore appears that we can hold off with HD for now    (3)Hyperkalemia - improving with medical therapy    (4)Hyponatremia - renally mediated - improving on PO NaHCO3.    (5)Hyperphosphatemia - renally mediated      RECOMMEND:  (1)Advance to PO loop diuretics - Lasix 80mg po daily  (2)Continue Lokelma, Phoslo, and PO NaHCO3 as ordered  (3)No dialysis for now  (4)No objection to discharge as soon as tomorrow (without dialysis) if numbers remain relatively stable through tomorrow; labwork within 3 days of discharge and outpatient Nephrology followup (Dr. Jaquan Soares) within 1 week           Rafita Denny MD  [facility]  Office/on call physician: [phone]  Cell (7a-7p): [phone]       No pain, no sob  Feeling better every day.      VITAL:  T(C): , Max: 36.4 (12-22-24 @ 21:54)  T(F): , Max: 97.6 (12-22-24 @ 21:54)  HR: 73 (12-23-24 @ 04:04)  BP: 120/76 (12-23-24 @ 04:04)  RR: 18 (12-23-24 @ 04:04)  SpO2: 98% (12-23-24 @ 04:04)      PHYSICAL EXAM:  Constitutional: NAD, Alert  HEENT: NCAT, DMM  Neck: Supple, No JVD  Respiratory: CTA-b/l  Cardiovascular: RRR s1s2, no m/r/g  Gastrointestinal: BS+, soft, NT/ND  : (+)mcconnell  Extremities: No peripheral edema b/l  Neurological: 0/5 strength of b/l LE  Back: no CVAT b/l  Skin: No rashes, no nevi      LABS:                        10.7   8.86  )-----------( 234      ( 23 Dec 2024 06:20 )             32.6     Na(131)/K(4.2)/Cl(88)/HCO3(25)/BUN(103)/Cr(6.93)Glu(102)/Ca(7.8)/Mg(--)/PO4(--)    12-23 @ 06:20  Na(128)/K(4.4)/Cl(87)/HCO3(23)/BUN(93)/Cr(7.03)Glu(153)/Ca(7.9)/Mg(--)/PO4(--)    12-22 @ 10:56  Na(128)/K(5.5)/Cl(91)/HCO3(18)/BUN(105)/Cr(7.06)Glu(133)/Ca(8.0)/Mg(--)/PO4(--)    12-21 @ 18:12  Na(129)/K(6.2)/Cl(95)/HCO3(16)/BUN(97)/Cr(7.21)Glu(93)/Ca(7.7)/Mg(2.5)/PO4(7.1)    12-21 @ 06:44  Na(131)/K(5.3)/Cl(95)/HCO3(20)/BUN(103)/Cr(6.67)Glu(102)/Ca(7.1)/Mg(--)/PO4(--)    12-20 @ 23:54  Na(132)/K(6.3)/Cl(98)/HCO3(15)/BUN(105)/Cr(7.38)Glu(128)/Ca(8.0)/Mg(--)/PO4(--)    12-20 @ 18:51  Na(129)/K(7.5)/Cl(94)/HCO3(17)/BUN(100)/Cr(7.51)Glu(122)/Ca(8.4)/Mg(--)/PO4(--)    12-20 @ 14:23      IMPRESSION: 74M w/ polio with associated neurogenic bladder/suprapubic catheter, HTN, and CKD4-5, 12/20/24 p/w uremia and hyperkalemia    (1)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 Plateaued numbers/potentially slowly starting to improve. No longer with significant uremic symptoms; therefore appears that we can hold off with HD for now    (3)Hyperkalemia - improving with medical therapy    (4)Hyponatremia - renally mediated - improving on PO NaHCO3.    (5)Hyperphosphatemia - renally mediated      RECOMMEND:  (1)Advance to PO loop diuretics - Lasix 80mg po daily  (2)Continue Lokelma, Phoslo, and PO NaHCO3 as ordered  (3)No dialysis for now  (4)No objection to discharge as soon as tomorrow (without dialysis) if numbers remain relatively stable through tomorrow; would plan for repeat bloodwork this Friday (12/27), and next Friday (1/3); could f/u at my office on Tuesday 1/7 at 12pm (my office will look to set up the labwork and office followup)          Rafita Denny MD  [facility]  Office/on call physician: [phone]  Cell (7a-7p): [phone]

## 2024-12-23 NOTE — DISCHARGE NOTE PROVIDER - HOSPITAL COURSE
NOTE INCOMPLETE BELOW:     Pt presented with   Bloodwork performed with his PCP Dr. Mcdowell on 11/29/24 revealed azotemia, ptn was sent by outptn nephrologist to the ED    Here Scr 7.51, pH7.22, HCO3 17, K 7.5, Na 129    Ptn seen by renal Dr. 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   He is uremic however no urgent need for HD   ongoing hyperkalemia. will start HD, vascular called to [facility]    s/p suprapubic exchange 12/16/24.  feels better today exc Left ear still with pain and fullness, ENT to F/U, cont acetic acid drops  K 4.4, transition from BUMEX drip to BUMEX 2 mg IV q12H, daily LOKELMA, no need for HD as per renal, on po Bicarb,   IV antiemetics prn nausea/vomiting  Urine: urinalysis, urine culture though may not be accurate 2/2 chronic catheter.   cont outptn meds  hold all nephrotoxic agents  DVT ppx w HSC  GI ppx w PPI HPI:  74 year-old man with history of multiple medical issues including polio with associated neurogenic bladder/suprapubic catheter, hypertension, and chronic kidney disease with base creatinine 4-5mg/dL. He presented today to the [facility] ER with worsening azotemia, seen by renal in the ED    in 2/2023 while admitted at [facility] with facial cellulitis he was noted to have a severely distended bladder/associated SONDRA.   He therefore underwent suprapubic catheter placement during the admission. The procedure was complicated by rectal rupture, and he ultimately required colostomy placement. Between 2/2023 and 11/2024, his creatinine was "always in the 4s". He underwent scheduled colostomy reversal on 11/20/24. Since then, he has not been himself. His urine output has been reduced - 400cc/d rather then 2L/d. His urine has had a green tinge to it. He has had a sensation of suprapubic pain and fullness. Additionally, he has suffered from generalized weakness, muted voice, and loss of appetite for the past few weeks, and some nausea over the past few days. He has had moderate to severe back pain for the past several weeks; his family was worried this may be due to his kidneys; he suspects that it is due to having a poor mattress to sleep on. He underwent suprapubic catheter change on Monday 12/16/24; since then, his urine output has returned to his baseline of ~2L/day. They share that he had bloodwork performed with his PCP Dr. Mcdowell on 11/29/24. His family asked that the labwork be sent to Nephrology Dr. Jaquan Soares early this week. Upon seeing the labwork results, and hearing about his recent symptoms, Dr. Soares advised that he go to the ER. (20 Dec 2024 15:08)    Hospital Course:  Pt presented with SONDRA w /metabolic acidosis. Has hx CKD 5. Here Scr 7.51, pH7.22, HCO3 17, K 7.5, Na 129. Evaled by nephrology and likely induced either by prolonged obstruction in association with suboptimal suprapubic catheter function prior to catheter change 12/16....or due to ischemic ATN in association with his recent colostomy reversal surgery. Pt uremic, however no urgent indication for HD. Pt refused b/l UE vein mapping. Pt to c/w PO sodium bicarb. Pt s/p bumex gtt and IV bumex, now on PO lasix. Pts labs noted for ongoing hyperkalemia, on PO lokelma daily. Pt s/p suprapubic exchange 12/16/24. Pt c/o L ear fullness, pain and hard of hearing. Evaled by NET, ear culture pos for candida. Pt started on acetic acid 2% otic drops, rec to continue as prescribed and outpt ENT follow up.     Important Medication Changes and Reason:  - see med rec    Active or Pending Issues Requiring Follow-up:  - PCP, nephrology, ENT    Advanced Directives:   [X] Full code  [ ] DNR  [ ] Hospice    Discharge Diagnoses:  - SONDRA w/ metabolic acidosis  - CKD 5  - hyperkalemia  - hyponatremia  - L ear pain/ candida      Discharge/Dispo/Med rec discussed with attending . Patient medically cleared for discharge Home with outpatient follow up with PCP, nephrology, ENT            HPI:  74 year-old man with history of multiple medical issues including polio with associated neurogenic bladder/suprapubic catheter, hypertension, and chronic kidney disease with base creatinine 4-5mg/dL. He presented today to the [facility] ER with worsening azotemia, seen by renal in the ED    in 2/2023 while admitted at [facility] with facial cellulitis he was noted to have a severely distended bladder/associated SONDRA.   He therefore underwent suprapubic catheter placement during the admission. The procedure was complicated by rectal rupture, and he ultimately required colostomy placement. Between 2/2023 and 11/2024, his creatinine was "always in the 4s". He underwent scheduled colostomy reversal on 11/20/24. Since then, he has not been himself. His urine output has been reduced - 400cc/d rather then 2L/d. His urine has had a green tinge to it. He has had a sensation of suprapubic pain and fullness. Additionally, he has suffered from generalized weakness, muted voice, and loss of appetite for the past few weeks, and some nausea over the past few days. He has had moderate to severe back pain for the past several weeks; his family was worried this may be due to his kidneys; he suspects that it is due to having a poor mattress to sleep on. He underwent suprapubic catheter change on Monday 12/16/24; since then, his urine output has returned to his baseline of ~2L/day. They share that he had bloodwork performed with his PCP Dr. Mcdowell on 11/29/24. His family asked that the labwork be sent to Nephrology Dr. Jaquan Soares early this week. Upon seeing the labwork results, and hearing about his recent symptoms, Dr. Soares advised that he go to the ER. (20 Dec 2024 15:08)    Hospital Course:  Pt presented with SONDRA w /metabolic acidosis. Has hx CKD 5. Here Scr 7.51, pH7.22, HCO3 17, K 7.5, Na 129. Evaled by nephrology and likely induced either by prolonged obstruction in association with suboptimal suprapubic catheter function prior to catheter change 12/16....or due to ischemic ATN in association with his recent colostomy reversal surgery. Pt uremic, however no urgent indication for HD. Pt refused b/l UE vein mapping. Pt to c/w PO sodium bicarb. Pt s/p bumex gtt and IV bumex, now on PO lasix. Pts labs noted for ongoing hyperkalemia, on PO lokelma daily. Pt s/p suprapubic exchange 12/16/24. Pt c/o L ear fullness, pain and hard of hearing. Evaled by ENT, ear culture pos for aspergillus niger. Pt started on acetic acid 2% otic drops, pt reported no relief. Reevaled by ENT on 12/24 and ear suction, drops changed, pt to continue as prescribed and follow up outpt.      Important Medication Changes and Reason:  - see med rec    Active or Pending Issues Requiring Follow-up:  - PCP, nephrology, ENT    Advanced Directives:   [X] Full code  [ ] DNR  [ ] Hospice    Discharge Diagnoses:  - SONDRA w/ metabolic acidosis  - CKD 5  - hyperkalemia  - hyponatremia  - L ear pain/ candida      Discharge/Dispo/Med rec discussed with attending . Patient medically cleared for discharge Home with outpatient follow up with PCP, nephrology, ENT

## 2024-12-23 NOTE — DISCHARGE NOTE PROVIDER - NSDCCPCAREPLAN_GEN_ALL_CORE_FT
PRINCIPAL DISCHARGE DIAGNOSIS  Diagnosis: Acute kidney injury superimposed on CKD  Assessment and Plan of Treatment: w/ metabolic acidosis  evaled by nephrology, follow up as outpt in 1 week with Dr. Monroe and repeat labs (BMP) with PCP  in 3 days post discharge      SECONDARY DISCHARGE DIAGNOSES  Diagnosis: Left ear pain  Assessment and Plan of Treatment: continue with acetic acid 2% drops as prescribed  follow up with ent outpatient  if symptoms return or worsen, contact provider   ear culture positive for candida     PRINCIPAL DISCHARGE DIAGNOSIS  Diagnosis: Acute kidney injury superimposed on CKD  Assessment and Plan of Treatment: w/ metabolic acidosis  evaled by nephrology, follow up with Dr ocampo on friday 12/27 and next friday 1/3 and 1/7  follow up with Dr. Medina Almonte outpt for dialysis access      SECONDARY DISCHARGE DIAGNOSES  Diagnosis: Left ear pain  Assessment and Plan of Treatment: continue with clotrimazole solution as prescribed  follow up with ent outpatient  if symptoms return or worsen, contact provider   ear culture positive for aspergillus  avoid getting left ear wet or submerged in water.   - avoid using ear plugs in left ear.    Diagnosis: Acute UTI  Assessment and Plan of Treatment: continue with bactrim as prescribed

## 2024-12-23 NOTE — DISCHARGE NOTE PROVIDER - NSDCFUADDAPPT_GEN_ALL_CORE_FT
APPTS ARE READY TO BE MADE: [X] YES    Best Family or Patient Contact (if needed):    Additional Information about above appointments (if needed):    1: PCP in 1-3 days   2: nephrology in 1 week   3: ENT in 1 week     Other comments or requests:    APPTS ARE READY TO BE MADE: [X] YES    Best Family or Patient Contact (if needed):    Additional Information about above appointments (if needed):    1: PCP in 1-3 days   2: nephrology on friday 12/27   3: ENT in 1 week     Other comments or requests:

## 2024-12-23 NOTE — DISCHARGE NOTE PROVIDER - CARE PROVIDER_API CALL
AGNES GALE Phys,    Phone: [phone]  Fax: ()-  Follow Up Time: 1 week    Yevgneiy Mcdowell  Internal Medicine  [address] [place] [address]  Phone: [phone]  Fax: [phone]  Follow Up Time: 1-3 days   AGNES GALE Phys,    Phone: ()-  Fax: ()-  Follow Up Time: 1 week    Yevgeniy Mcdowell  Internal Medicine  [address] [place] [address]  Phone: [phone]  Fax: [phone]  Follow Up Time: 1-3 days    Rafita Denny  Nephrology  [address] [place] [address] [address]  [place] [address]  Phone: [phone]  Fax: [phone]  Scheduled Appointment: 12/27/2024    Medina Almonte  Vascular Surgery  [address] [place] [address], [address]  [place] [address]  Phone: [phone]  Fax: [phone]  Follow Up Time:

## 2024-12-23 NOTE — DISCHARGE NOTE PROVIDER - PROVIDER TOKENS
PROVIDER:[TOKEN:[[id number]],FOLLOWUP:[1 week]],PROVIDER:[TOKEN:[[id number]],FOLLOWUP:[1-3 days]] PROVIDER:[TOKEN:[[id number]],FOLLOWUP:[1 week]],PROVIDER:[TOKEN:[[id number]],FOLLOWUP:[1-3 days]],PROVIDER:[TOKEN:[[id number]],SCHEDULEDAPPT:[12/27/2024]],PROVIDER:[TOKEN:[[id number]]]

## 2024-12-23 NOTE — DISCHARGE NOTE PROVIDER - CARE PROVIDERS DIRECT ADDRESSES
,[email or web address] ,[email or web address]

## 2024-12-23 NOTE — PROGRESS NOTE ADULT - ASSESSMENT
73yo M with Hx polio, neurogenic bladder (s/p suprapubic catheter), HTN, and CKD who presented with uremia and hyperkalemia c/f worsening azotemia. Vascular Surgery consulted for potential HD access.     PLAN:   - No plans for urgent HD at this time  - please continue with LUE precautions - no blood draws, IVs, blood pressure cuffs   - no need for emergent HD at the moment  - upon discharge patient may follow up with attending Dr. Medina Almonte for dialysis access   - please contact us with questions or concerns    Vascular Surgery   [phone]

## 2024-12-23 NOTE — PROGRESS NOTE ADULT - SUBJECTIVE AND OBJECTIVE BOX
Vascular Surgery Progress Note     Subjective:  Patient seen and examined.       Vital Signs:  Vital Signs Last 24 Hrs  T(C): 36.5 (23 Dec 2024 11:33), Max: 36.5 (23 Dec 2024 11:33)  T(F): 97.7 (23 Dec 2024 11:33), Max: 97.7 (23 Dec 2024 11:33)  HR: 78 (23 Dec 2024 11:33) (73 - 78)  BP: 116/77 (23 Dec 2024 11:33) (116/77 - 131/69)  RR: 18 (23 Dec 2024 11:33) (18 - 18)  SpO2: 96% (23 Dec 2024 11:33) (93% - 98%)    Parameters below as of 23 Dec 2024 11:33  Patient On (Oxygen Delivery Method): room air        CAPILLARY BLOOD GLUCOSE          I&O's Detail    22 Dec 2024 07:01  -  23 Dec 2024 07:00  --------------------------------------------------------  IN:  Total IN: 0 mL    OUT:    Indwelling Catheter - Suprapubic (mL): 800 mL  Total OUT: 800 mL    Total NET: -800 mL            Physical Exam:  General: NAD, resting comfortably in bed  Respiratory: Nonlabored respirations  Cardio: pulse present  Extremities: no gross deformities; sensorimotor intact in b/l UE, LUE radial pulse, arm protection band placed in LUE      Labs:    12-23    131[L]  |  88[L]  |  103[H]  ----------------------------<  102[H]  4.2   |  25  |  6.93[H]    Ca    7.8[L]      23 Dec 2024 06:20                              10.7   8.86  )-----------( 234      ( 23 Dec 2024 06:20 )             32.6     PT/INR - ( 21 Dec 2024 18:12 )   PT: 13.2 sec;   INR: 1.15 ratio         PTT - ( 21 Dec 2024 18:12 )  PTT:33.1 sec

## 2024-12-23 NOTE — PROGRESS NOTE ADULT - SUBJECTIVE AND OBJECTIVE BOX
Patient is a 74y old  Male who presents with a chief complaint of SONDRA w/ metabolic acidosis  (23 Dec 2024 15:24)      SUBJECTIVE / OVERNIGHT EVENTS: ongoing pain in Left ear, ENT following, no intervention done exc acetic acid drops. ptn is very unhappy further intervention hasn't been done. [facility] family [facility] is involved. SONDRA resolved, hyperkalemia resolved, cont po Bicarb. ptn is requesting  referral for outptn F/U. DR Goldberg's group to see in am    MEDICATIONS  (STANDING):  acetic acid 2% Solution 4 Drop(s) Left Ear every 6 hours  amLODIPine   Tablet 5 milliGRAM(s) Oral daily  atorvastatin 40 milliGRAM(s) Oral at bedtime  calcium acetate 667 milliGRAM(s) Oral three times a day with meals  heparin   Injectable 5000 Unit(s) SubCutaneous every 12 hours  influenza  Vaccine (HIGH DOSE) 0.5 milliLiter(s) IntraMuscular once  pantoprazole    Tablet 40 milliGRAM(s) Oral before breakfast  sodium bicarbonate 1300 milliGRAM(s) Oral three times a day  sodium zirconium cyclosilicate 10 Gram(s) Oral daily    MEDICATIONS  (PRN):  acetaminophen     Tablet .. 650 milliGRAM(s) Oral every 6 hours PRN Temp greater or equal to 38C (100.4F), Mild Pain (1 - 3)  benzonatate 100 milliGRAM(s) Oral every 8 hours PRN Cough  ondansetron Injectable 4 milliGRAM(s) IV Push every 6 hours PRN Nausea and/or Vomiting  oxyCODONE    IR 10 milliGRAM(s) Oral every 6 hours PRN for moderate pain  zolpidem 5 milliGRAM(s) Oral at bedtime PRN Insomnia      Vital Signs Last 24 Hrs  T(F): 97.7 (12-23-24 @ 11:33), Max: 97.7 (12-23-24 @ 11:33)  HR: 78 (12-23-24 @ 11:33) (73 - 78)  BP: 116/77 (12-23-24 @ 11:33) (116/77 - 131/69)  RR: 18 (12-23-24 @ 11:33) (18 - 18)  SpO2: 96% (12-23-24 @ 11:33) (93% - 98%)  Telemetry:   CAPILLARY BLOOD GLUCOSE        I&O's Summary    22 Dec 2024 07:01  -  23 Dec 2024 07:00  --------------------------------------------------------  IN: 0 mL / OUT: 800 mL / NET: -800 mL        PHYSICAL EXAM:  GENERAL: NAD, well-developed  HEAD:  Atraumatic, Normocephalic  EYES: EOMI, PERRLA, conjunctiva and sclera clear  NECK: Supple, No JVD  CHEST/LUNG: Clear to auscultation bilaterally; No wheeze  HEART: Regular rate and rhythm; No murmurs, rubs, or gallops  ABDOMEN: Soft, Nontender, Nondistended; Bowel sounds present  EXTREMITIES:  2+ Peripheral Pulses, No clubbing, cyanosis, or edema  PSYCH: AAOx3  NEUROLOGY: non-focal  SKIN: No rashes or lesions    LABS:                        10.7   8.86  )-----------( 234      ( 23 Dec 2024 06:20 )             32.6     12-23    131[L]  |  88[L]  |  103[H]  ----------------------------<  102[H]  4.2   |  25  |  6.93[H]    Ca    7.8[L]      23 Dec 2024 06:20            Urinalysis Basic - ( 23 Dec 2024 06:20 )    Color: x / Appearance: x / SG: x / pH: x  Gluc: 102 mg/dL / Ketone: x  / Bili: x / Urobili: x   Blood: x / Protein: x / Nitrite: x   Leuk Esterase: x / RBC: x / WBC x   Sq Epi: x / Non Sq Epi: x / Bacteria: x        RADIOLOGY & ADDITIONAL TESTS:    Imaging Personally Reviewed:    Consultant(s) Notes Reviewed:      Care Discussed with Consultants/Other Providers:

## 2024-12-23 NOTE — PROGRESS NOTE ADULT - ASSESSMENT
74 year-old man with history of anemia of chronic dz, CKD4, Scr 4 at baseline, HTN, polio, wheelchair bound, DVT(RLE) 5/2024, BPH w LUTS, 2/2024 bladder outlet obstruction ( 2/2 neurogenic bladder) followed by suprapubic catheter placement complicated by rectal rupture requiring a diverting sigmoid loop colostomy reversal of which was done in November 2024 at another hospital  Ptn lives w a chronic suprapubic catheter   on 12/20  ptn presents  to the [facility] ER with worsening azotemia    Family states ever since the  colostomy reversal on 11/20/24.  he has not been himself.   His urine output has been reduced - 400cc/d rather then 2L/d. His urine has had a green tinge to it. He has had a sensation of suprapubic pain and fullness. Additionally, he has suffered from generalized weakness, muted voice, and loss of appetite for the past few weeks, and some nausea over the past few days. He has had moderate to severe back pain for the past several weeks; his family was worried this may be due to his kidneys; he suspects that it is due to having a poor mattress to sleep on. He underwent suprapubic catheter change on Monday 12/16/24; since then, his urine output has returned to his baseline of ~2L/day.     Bloodwork performed with his PCP Dr. Mcdowell on 11/29/24 revealed azotemia, ptn was sent by outptn nephrologist to the ED    Here Scr 7.51, pH7.22, HCO3 17, K 7.5, Na 129    Ptn seen by renal Dr. 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   He is uremic however no urgent need for HD       s/p suprapubic exchange 12/16/24.  ongoing pain in Left ear, ENT following, no intervention done exc acetic acid drops. ptn is very unhappy further intervention hasn't been done. patient family [facility] is involved. SONDRA resolved, hyperkalemia resolved, cont po Bicarb. ptn is requesting  referral for outptn F/U. DR Goldberg's group to see in am  off bumex and lokelma  nausea resolved  Urine: urinalysis, urine culture though may not be accurate 2/2 chronic catheter.   cont outptn meds  hold all nephrotoxic agents  DVT ppx w HSC  GI ppx w PPI  DC planning

## 2024-12-23 NOTE — DISCHARGE NOTE PROVIDER - NSFOLLOWUPCLINICS_GEN_ALL_ED_FT
[facility] - ENT  Otolaryngology (ENT)  [address] [place] [address]  Phone: [phone]  Fax:   Follow Up Time: 1 week

## 2024-12-23 NOTE — DISCHARGE NOTE PROVIDER - NSDCMRMEDTOKEN_GEN_ALL_CORE_FT
acetaminophen 500 mg oral tablet: 2 tab(s) orally every 6 hours as needed for  mild pain  amLODIPine 5 mg oral tablet: 1 tab(s) orally once a day  Ciprofloxacin-Dexamethasone 0.3%-0.1% otic suspension: 2 drop(s) in each affected ear 2 times a day  Jardiance 10 mg oral tablet: 1 tab(s) orally once a day  Lipitor 40 mg oral tablet: 1 tab(s) orally once a day (at bedtime)  Neomycin Polymyxin HC Otic: 3 drops four times a day into the ears  oxyCODONE 10 mg oral tablet: 1 tab(s) orally once a day as needed for  moderate pain  zolpidem 5 mg oral tablet: 1 tab(s) orally once a day (at bedtime)   acetaminophen 500 mg oral tablet: 2 tab(s) orally every 6 hours as needed for  mild pain  amLODIPine 5 mg oral tablet: 1 tab(s) orally once a day  Bactrim 400 mg-80 mg oral tablet: 1 tab(s) orally once a day Take first dose tonight 12/25, last dose on 12/28/24  calcium acetate 667 mg oral tablet: 1 tab(s) orally 3 times a day  clotrimazole 1% topical solution: 5 drop(s) in the left ear 2 times a day  furosemide 80 mg oral tablet: 1 tab(s) orally once a day  Jardiance 10 mg oral tablet: 1 tab(s) orally once a day  Lipitor 40 mg oral tablet: 1 tab(s) orally once a day (at bedtime)  Lokelma 5 g oral powder for reconstitution: 1 packet(s) orally once a day  oxyCODONE 10 mg oral tablet: 1 tab(s) orally once a day as needed for  moderate pain  pantoprazole 40 mg oral delayed release tablet: 1 tab(s) orally once a day (before a meal)  sodium bicarbonate 650 mg oral tablet: 2 tab(s) orally 3 times a day  zolpidem 5 mg oral tablet: 1 tab(s) orally once a day (at bedtime)

## 2024-12-23 NOTE — PROGRESS NOTE ADULT - ATTENDING COMMENTS
Patient with CKD.  No emergent need for HD.  May follow up as outpatient if long term access needed.

## 2024-12-24 LAB
-  AMIKACIN: SIGNIFICANT CHANGE UP
-  AZTREONAM: SIGNIFICANT CHANGE UP
-  CEFEPIME: SIGNIFICANT CHANGE UP
-  CEFTRIAXONE: SIGNIFICANT CHANGE UP
-  CIPROFLOXACIN: SIGNIFICANT CHANGE UP
-  GENTAMICIN: SIGNIFICANT CHANGE UP
-  LEVOFLOXACIN: SIGNIFICANT CHANGE UP
-  MEROPENEM: SIGNIFICANT CHANGE UP
-  PIPERACILLIN/TAZOBACTAM: SIGNIFICANT CHANGE UP
-  TOBRAMYCIN: SIGNIFICANT CHANGE UP
-  TRIMETHOPRIM/SULFAMETHOXAZOLE: SIGNIFICANT CHANGE UP
ANION GAP SERPL CALC-SCNC: 20 MMOL/L — HIGH (ref 5–17)
BUN SERPL-MCNC: 103 MG/DL — HIGH (ref 7–23)
CALCIUM SERPL-MCNC: 8.2 MG/DL — LOW (ref 8.4–10.5)
CHLORIDE SERPL-SCNC: 87 MMOL/L — LOW (ref 96–108)
CO2 SERPL-SCNC: 25 MMOL/L — SIGNIFICANT CHANGE UP (ref 22–31)
CREAT SERPL-MCNC: 7.08 MG/DL — HIGH (ref 0.5–1.3)
CULTURE RESULTS: ABNORMAL
EGFR: 8 ML/MIN/1.73M2 — LOW
GLUCOSE SERPL-MCNC: 107 MG/DL — HIGH (ref 70–99)
METHOD TYPE: SIGNIFICANT CHANGE UP
ORGANISM # SPEC MICROSCOPIC CNT: ABNORMAL
ORGANISM # SPEC MICROSCOPIC CNT: ABNORMAL
POTASSIUM SERPL-MCNC: 4 MMOL/L — SIGNIFICANT CHANGE UP (ref 3.5–5.3)
POTASSIUM SERPL-SCNC: 4 MMOL/L — SIGNIFICANT CHANGE UP (ref 3.5–5.3)
SODIUM SERPL-SCNC: 132 MMOL/L — LOW (ref 135–145)
SPECIMEN SOURCE: SIGNIFICANT CHANGE UP

## 2024-12-24 PROCEDURE — 76770 US EXAM ABDO BACK WALL COMP: CPT | Mod: 26

## 2024-12-24 RX ORDER — CLOTRIMAZOLE 10 MG/G
1 CREAM TOPICAL
Refills: 0 | Status: DISCONTINUED | OUTPATIENT
Start: 2024-12-24 | End: 2024-12-24

## 2024-12-24 RX ORDER — CEFTRIAXONE SODIUM 1 G/1
1000 INJECTION, POWDER, FOR SOLUTION INTRAMUSCULAR; INTRAVENOUS EVERY 24 HOURS
Refills: 0 | Status: DISCONTINUED | OUTPATIENT
Start: 2024-12-24 | End: 2024-12-25

## 2024-12-24 RX ADMIN — CEFTRIAXONE SODIUM 100 MILLIGRAM(S): 1 INJECTION, POWDER, FOR SOLUTION INTRAMUSCULAR; INTRAVENOUS at 17:21

## 2024-12-24 RX ADMIN — HEPARIN SODIUM 5000 UNIT(S): 1000 INJECTION, SOLUTION INTRAVENOUS; SUBCUTANEOUS at 05:27

## 2024-12-24 RX ADMIN — Medication 10 MILLIGRAM(S): at 23:40

## 2024-12-24 RX ADMIN — SODIUM BICARBONATE 1300 MILLIGRAM(S): 84 INJECTION, SOLUTION INTRAVENOUS at 15:32

## 2024-12-24 RX ADMIN — Medication 10 MILLIGRAM(S): at 05:26

## 2024-12-24 RX ADMIN — Medication 5 MILLIGRAM(S): at 21:13

## 2024-12-24 RX ADMIN — Medication 10 MILLIGRAM(S): at 18:36

## 2024-12-24 RX ADMIN — CALCIUM ACETATE 667 MILLIGRAM(S): 667 CAPSULE ORAL at 08:41

## 2024-12-24 RX ADMIN — ACETIC ACID 4 DROP(S): 20 SOLUTION AURICULAR (OTIC) at 00:08

## 2024-12-24 RX ADMIN — Medication 10 MILLIGRAM(S): at 17:36

## 2024-12-24 RX ADMIN — ACETIC ACID 4 DROP(S): 20 SOLUTION AURICULAR (OTIC) at 12:35

## 2024-12-24 RX ADMIN — ATORVASTATIN CALCIUM 40 MILLIGRAM(S): 40 TABLET, FILM COATED ORAL at 21:07

## 2024-12-24 RX ADMIN — Medication 5 MILLIGRAM(S): at 05:27

## 2024-12-24 RX ADMIN — ACETIC ACID 4 DROP(S): 20 SOLUTION AURICULAR (OTIC) at 05:27

## 2024-12-24 RX ADMIN — SODIUM ZIRCONIUM CYCLOSILICATE 10 GRAM(S): 10 POWDER, FOR SUSPENSION ORAL at 12:35

## 2024-12-24 RX ADMIN — HEPARIN SODIUM 5000 UNIT(S): 1000 INJECTION, SOLUTION INTRAVENOUS; SUBCUTANEOUS at 17:23

## 2024-12-24 RX ADMIN — ACETIC ACID 4 DROP(S): 20 SOLUTION AURICULAR (OTIC) at 17:23

## 2024-12-24 RX ADMIN — Medication 80 MILLIGRAM(S): at 05:33

## 2024-12-24 RX ADMIN — SODIUM BICARBONATE 1300 MILLIGRAM(S): 84 INJECTION, SOLUTION INTRAVENOUS at 21:07

## 2024-12-24 RX ADMIN — CALCIUM ACETATE 667 MILLIGRAM(S): 667 CAPSULE ORAL at 17:22

## 2024-12-24 RX ADMIN — PANTOPRAZOLE 40 MILLIGRAM(S): 40 TABLET, DELAYED RELEASE ORAL at 05:27

## 2024-12-24 RX ADMIN — SODIUM BICARBONATE 1300 MILLIGRAM(S): 84 INJECTION, SOLUTION INTRAVENOUS at 05:27

## 2024-12-24 NOTE — PROGRESS NOTE ADULT - ASSESSMENT
74 year old, A&Ox4 M, with PMHx of polio with associated neurogenic bladder/superpubic catherter, HTN, HLD, CKD, presented to ED on 12/20 for worsening azotemia. ENT has been on board for management left ear fungal infection.  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 Ear bacterial culture prelim shows numerous mold like fungus. Pt has been on acetic ear drop since day 1. However, pt continues to experience left ear pain despite being on antifungal acetic acid otic drops. Pt seen at bedside. Endorsed feeling muffled hearing with ear pain. On otoscope exam, noted black debris/fungus occluding the ear canal. s/p complete suctioning of black debris. pt tolerated procedure well, no complications.

## 2024-12-24 NOTE — PROGRESS NOTE ADULT - SUBJECTIVE AND OBJECTIVE BOX
[facility] DIVISION OF INFECTIOUS DISEASES  SABINO Griffin Y. Patel, S. Shah, G. Julio César  [phone]  ([phone] - weekdays after 5pm and weekends)    Name: BRIAN DEMPSEY  Age/Gender: 74y Male  MRN: [id number]    Interval History:  Patient seen and examined this morning.   States ear pain improved since starting ear drops  But still with decreased hearing, no fevers.   Notes reviewed. No concerning overnight events  Afebrile. Wife at bedside.  Allergies: No Known Allergies      Objective:  Vitals:   T(F): 97.5 (12-24-24 @ 04:55), Max: 98.3 (12-23-24 @ 21:19)  HR: 78 (12-24-24 @ 04:55) (75 - 78)  BP: 131/77 (12-24-24 @ 04:55) (113/60 - 131/77)  RR: 18 (12-24-24 @ 04:55) (18 - 18)  SpO2: 96% (12-24-24 @ 04:55) (96% - 96%)  Physical Examination:  General: no acute distress, nontoxic appearing   HEENT: NC/AT, anicteric, EOMI  Respiratory: no acc muscle use, breathing comfortably  Cardiovascular: S1 and S2 present  Gastrointestinal: nondistended  Extremities: no edema, no cyanosis  Skin: no visible rash    Laboratory Studies:  CBC:                       10.7   8.86  )-----------( 234      ( 23 Dec 2024 06:20 )             32.6     WBC Trend:  8.86 12-23-24 @ 06:20  10.40 12-22-24 @ 06:52  6.96 12-21-24 @ 06:44  9.13 12-20-24 @ 14:23    CMP: 12-24    132[L]  |  87[L]  |  103[H]  ----------------------------<  107[H]  4.0   |  25  |  7.08[H]    Ca    8.2[L]      24 Dec 2024 07:30      Creatinine: 7.08 mg/dL (12-24-24 @ 07:30)  Creatinine: 6.93 mg/dL (12-23-24 @ 06:20)  Creatinine: 7.03 mg/dL (12-22-24 @ 10:56)  Creatinine: 7.06 mg/dL (12-21-24 @ 18:12)  Creatinine: 7.21 mg/dL (12-21-24 @ 06:44)  Creatinine: 6.67 mg/dL (12-20-24 @ 23:54)  Creatinine: 7.38 mg/dL (12-20-24 @ 18:51)  Creatinine: 7.51 mg/dL (12-20-24 @ 14:23)    Microbiology: reviewed   Culture - Urine (collected 12-21-24 @ 06:42)  Source: Clean Catch Clean Catch (Midstream)  Preliminary Report (12-23-24 @ 13:49):    10,000 - 49,000 CFU/mL Delftia acidovorans group  Organism: Delftia acidovorans group (12-24-24 @ 07:45)  Organism: Delftia acidovorans group (12-24-24 @ 07:45)      Method Type: PRIMITIVO      -  Amikacin: R >32      -  Aztreonam: S <=4      -  Cefepime: S <=2      -  Ceftriaxone: S <=1      -  Ciprofloxacin: R >2      -  Gentamicin: R >8      -  Levofloxacin: R >4      -  Meropenem: S <=1      -  Piperacillin/Tazobactam: S <=8      -  Tobramycin: R >8      -  Trimethoprim/Sulfamethoxazole: S <=0.5/9.5    Culture - Fungal, Other (collected 12-20-24 @ 20:22)  Source: .Other  Preliminary Report (12-23-24 @ 09:59):    Moderate Mold Like Fungus    Culture - Ear, Nose and Throat (ENT) (collected 12-20-24 @ 20:21)  Source: Ear  Gram Stain (12-22-24 @ 10:27):    No polymorphonuclear leukocytes seen    No organisms seen    by cytocentrifuge  Preliminary Report (12-22-24 @ 10:27):    Numerous Mold Like Fungus    Radiology: reviewed     Medications:  acetaminophen     Tablet .. 650 milliGRAM(s) Oral every 6 hours PRN  acetic acid 2% Solution 4 Drop(s) Left Ear every 6 hours  amLODIPine   Tablet 5 milliGRAM(s) Oral daily  atorvastatin 40 milliGRAM(s) Oral at bedtime  benzonatate 100 milliGRAM(s) Oral every 8 hours PRN  calcium acetate 667 milliGRAM(s) Oral three times a day with meals  furosemide    Tablet 80 milliGRAM(s) Oral daily  heparin   Injectable 5000 Unit(s) SubCutaneous every 12 hours  influenza  Vaccine (HIGH DOSE) 0.5 milliLiter(s) IntraMuscular once  ondansetron Injectable 4 milliGRAM(s) IV Push every 6 hours PRN  oxyCODONE    IR 10 milliGRAM(s) Oral every 6 hours PRN  pantoprazole    Tablet 40 milliGRAM(s) Oral before breakfast  sodium bicarbonate 1300 milliGRAM(s) Oral three times a day  sodium zirconium cyclosilicate 10 Gram(s) Oral daily  zolpidem 5 milliGRAM(s) Oral at bedtime PRN

## 2024-12-24 NOTE — PROGRESS NOTE ADULT - SUBJECTIVE AND OBJECTIVE BOX
NEPHROLOGY     Patient seen and examined resting on room air, still with some left ear discomfort, denies cp, no sob, in no acute distress.     MEDICATIONS  (STANDING):  acetic acid 2% Solution 4 Drop(s) Left Ear every 6 hours  amLODIPine   Tablet 5 milliGRAM(s) Oral daily  atorvastatin 40 milliGRAM(s) Oral at bedtime  calcium acetate 667 milliGRAM(s) Oral three times a day with meals  furosemide    Tablet 80 milliGRAM(s) Oral daily  heparin   Injectable 5000 Unit(s) SubCutaneous every 12 hours  influenza  Vaccine (HIGH DOSE) 0.5 milliLiter(s) IntraMuscular once  pantoprazole    Tablet 40 milliGRAM(s) Oral before breakfast  sodium bicarbonate 1300 milliGRAM(s) Oral three times a day  sodium zirconium cyclosilicate 10 Gram(s) Oral daily    VITALS:  T(C): , Max: 36.8 (12-23-24 @ 21:19)  T(F): , Max: 98.3 (12-23-24 @ 21:19)  HR: 70 (12-24-24 @ 12:01)  BP: 139/70 (12-24-24 @ 12:01)  RR: 18 (12-24-24 @ 12:01)  SpO2: 94% (12-24-24 @ 12:01)    I and O's:    12-23 @ 07:01  -  12-24 @ 07:00  --------------------------------------------------------  IN: 0 mL / OUT: 800 mL / NET: -800 mL    PHYSICAL EXAM:  Constitutional: NAD, Alert  HEENT: NCAT, DMM  Neck: Supple, No JVD  Respiratory: CTA-b/l  Cardiovascular: RRR s1s2, no m/r/g  Gastrointestinal: BS+, soft, NT/ND  : (+)mcconnell  Extremities: No peripheral edema b/l  Neurological: 0/5 strength of b/l LE  Back: no CVAT b/l  Skin: No rashes, no nevi      LABS:                        10.7   8.86  )-----------( 234      ( 23 Dec 2024 06:20 )             32.6     12-24    132[L]  |  87[L]  |  103[H]  ----------------------------<  107[H]  4.0   |  25  |  7.08[H]    Ca    8.2[L]      24 Dec 2024 07:30

## 2024-12-24 NOTE — PROGRESS NOTE ADULT - ASSESSMENT
74 year-old man with history of anemia of chronic dz, CKD4, Scr 4 at baseline, HTN, polio, wheelchair bound, DVT(RLE) 5/2024, BPH w LUTS, 2/2024 bladder outlet obstruction ( 2/2 neurogenic bladder) followed by suprapubic catheter placement complicated by rectal rupture requiring a diverting sigmoid loop colostomy reversal of which was done in November 2024 at another hospital  Ptn lives w a chronic suprapubic catheter   on 12/20  ptn presents  to the [facility] ER with worsening azotemia    Family states ever since the  colostomy reversal on 11/20/24.  he has not been himself.   His urine output has been reduced - 400cc/d rather then 2L/d. His urine has had a green tinge to it. He has had a sensation of suprapubic pain and fullness. Additionally, he has suffered from generalized weakness, muted voice, and loss of appetite for the past few weeks, and some nausea over the past few days. He has had moderate to severe back pain for the past several weeks; his family was worried this may be due to his kidneys; he suspects that it is due to having a poor mattress to sleep on. He underwent suprapubic catheter change on Monday 12/16/24; since then, his urine output has returned to his baseline of ~2L/day.     Bloodwork performed with his PCP Dr. Mcdowell on 11/29/24 revealed azotemia, ptn was sent by outptn nephrologist to the ED    Here Scr 7.51, pH7.22, HCO3 17, K 7.5, Na 129    Ptn seen by renal Dr. 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   He is uremic however no urgent need for HD       s/p suprapubic exchange 12/16/24.  12/23; ongoing pain in Left ear, ENT following, no intervention done exc acetic acid drops. ptn is very unhappy further intervention hasn't been done. patient family center is involved.   12/24: sondra has improved, Scr has plateaued. Left ear otitis externa , cx growing Aspergillus, as per ID ptn should have a debridement. left a VM and a teams message with Dr. Vasquez of ENT ( attending) . despite acetic acid drops ptn is still quite symptomatic and very unhappy. his hearing is off.   cystitis 2/2 chronic suprapubic catheter. 5 days course of Ctx started for Ucx growing Delftia. rpt renal US: unchanged, mild b/l hydro  hyperkalemia resolved, cont po Bicarb.   off bumex and lokelma  nausea resolved  cont outptn meds  hold all nephrotoxic agents  DVT ppx w HSC  GI ppx w PPI  DC planning w outptn , ENT and Renal f/u

## 2024-12-24 NOTE — PROGRESS NOTE ADULT - SUBJECTIVE AND OBJECTIVE BOX
ENT ISSUE/POD: left ear fungal infection     74 year old, A&Ox4 M, with PMHx of polio with associated neurogenic bladder/superpubic catherter, HTN, HLD, CKD, presented to ED on 12/20 for worsening azotemia. ENT was also consulted for 1 month history of left ear pain which hasn't improved after using ear drops and abx prescribed by his PCP. Otoscope exam revealed left ear canal with white and black debris. 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 Bacterial culture prelim shows numerous mold like fungus. Pt has been on acetic ear drop since day 1. However, pt continues to experience left ear pain despite being on antifungal ear drops. Pt seen at bedside. Endorsed feeling muffled hearing with ear pain. Otherwise no other complaints.     PAST MEDICAL & SURGICAL HISTORY:  Polio  Hypertension  H/O urinary retention  suprapubic tube  HLD (hyperlipidemia)  BPH with obstruction/lower urinary tract symptoms  Erectile dysfunction  Bladder outlet obstruction  Presence of suprapubic catheter  Colostomy status  DVT, lower extremity  H/O cardiac murmur  S/P colostomy  2/2024  Suprapubic catheter  S/P ORIF (open reduction internal fixation) fracture  b/l legs  S/P cataract surgery  right  H/O shoulder surgery    Allergies  No Known Allergies    MEDICATIONS  (STANDING):  amLODIPine   Tablet 5 milliGRAM(s) Oral daily  atorvastatin 40 milliGRAM(s) Oral at bedtime  calcium acetate 667 milliGRAM(s) Oral three times a day with meals  cefTRIAXone   IVPB 1000 milliGRAM(s) IV Intermittent every 24 hours  furosemide    Tablet 80 milliGRAM(s) Oral daily  heparin   Injectable 5000 Unit(s) SubCutaneous every 12 hours  influenza  Vaccine (HIGH DOSE) 0.5 milliLiter(s) IntraMuscular once  pantoprazole    Tablet 40 milliGRAM(s) Oral before breakfast  sodium bicarbonate 1300 milliGRAM(s) Oral three times a day  sodium zirconium cyclosilicate 10 Gram(s) Oral daily    MEDICATIONS  (PRN):  acetaminophen     Tablet .. 650 milliGRAM(s) Oral every 6 hours PRN Temp greater or equal to 38C (100.4F), Mild Pain (1 - 3)  benzonatate 100 milliGRAM(s) Oral every 8 hours PRN Cough  ondansetron Injectable 4 milliGRAM(s) IV Push every 6 hours PRN Nausea and/or Vomiting  oxyCODONE    IR 10 milliGRAM(s) Oral every 6 hours PRN for moderate pain  zolpidem 5 milliGRAM(s) Oral at bedtime PRN Insomnia    Social History: see consult note  Family history: see consult note    ROS:   ENT: all negative except as noted in HPI   Pulm: denies SOB, cough, hemoptysis  Neuro: denies numbness/tingling, loss of sensation  Endo: denies heat/cold intolerance, excessive sweating    Vital Signs Last 24 Hrs  T(C): 36.3 (24 Dec 2024 20:49), Max: 36.8 (23 Dec 2024 21:19)  T(F): 97.4 (24 Dec 2024 20:49), Max: 98.3 (23 Dec 2024 21:19)  HR: 72 (24 Dec 2024 20:49) (59 - 78)  BP: 108/71 (24 Dec 2024 20:49) (108/71 - 147/77)  BP(mean): --  RR: 18 (24 Dec 2024 20:49) (18 - 18)  SpO2: 95% (24 Dec 2024 20:49) (94% - 97%)    Parameters below as of 24 Dec 2024 20:49  Patient On (Oxygen Delivery Method): room air                        10.7   8.86  )-----------( 234      ( 23 Dec 2024 06:20 )             32.6    12-24    132[L]  |  87[L]  |  103[H]  ----------------------------<  107[H]  4.0   |  25  |  7.08[H]    Ca    8.2[L]      24 Dec 2024 07:30    PHYSICAL EXAM:  Gen: NAD  Skin: No rashes, bruises, or lesions  Head: Normocephalic, Atraumatic  Face: no edema, erythema, or fluctuance. Parotid glands soft without mass  Eyes: no scleral injection  Ears: Right: ear canal clear, TM intact without effusion or erythema. No evidence of any fluid drainage. No mastoid tenderness, erythema, or ear bulging            Left: black fungal debris occluding ear canal, s/p complete removal of black debris via suctioning.   Nose: Nares bilaterally patent, no discharge  Mouth: No Stridor / Drooling / Trismus.  Mucosa moist, tongue/uvula midline, oropharynx clear  Neck: Flat, supple, no lymphadenopathy, trachea midline, no masses  Lymphatic: No lymphadenopathy  Resp: breathing easily, no stridor  Neuro: facial nerve intact, no facial droop

## 2024-12-24 NOTE — PROGRESS NOTE ADULT - ASSESSMENT
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 Plateaued numbers/potentially slowly starting to improve. No longer with significant uremic symptoms; therefore appears that we can hold off with HD for now    (3)Hyperkalemia - improved/stable with medical therapy    (4)Hyponatremia - renally mediated - improving on PO NaHCO3.    (5)Hyperphosphatemia - renally mediated    RECOMMEND:  (1)Continue Lasix 80mg po daily  (2)Continue Lokelma, Phoslo, and PO NaHCO3 as ordered  (3)No dialysis for now  (4)Follow up renal US per   (5)No objection to discharge given numbers remain relatively stable and if no other issues ensue; would plan for repeat bloodwork this Friday (12/27), and next Friday (1/3); follow up in office with Dr. Denny on Tuesday 1/7 at 12pm     D/w Dr. Eduin Darden, [facility]  [phone]

## 2024-12-24 NOTE — CONSULT NOTE ADULT - SUBJECTIVE AND OBJECTIVE BOX
Urology Consult Note    Chief Complaint: azotemia      History of Present Illness: 74 year-old man with history of anemia of chronic dz, CKD4, Scr 4 at baseline, HTN, polio, wheelchair bound, DVT(RLE) 5/2024, BPH w LUTS, 2/2024 bladder outlet obstruction ( 2/2 neurogenic bladder) followed by suprapubic catheter placement complicated by rectal rupture requiring a diverting sigmoid loop colostomy reversal. initial surgery at beginning of 2024, reversal was last month.   Ptn lives w a chronic suprapubic catheter which is changed by IR after his complication during surgery.   on 12/20  ptn presents  to the [facility] ER with worsening azotemia  Family states ever since the  colostomy reversal on 11/20/24 he has not felt himself  SP tube was changed last week, prior to that it had poor drainage they report  Cr remains very high nephrology considering HD      PAST MEDICAL & SURGICAL HISTORY:  Polio      Hypertension      H/O urinary retention  suprapubic tube      HLD (hyperlipidemia)      BPH with obstruction/lower urinary tract symptoms      Erectile dysfunction      Bladder outlet obstruction      Presence of suprapubic catheter      Colostomy status      DVT, lower extremity      H/O cardiac murmur      S/P colostomy  2/2024      Suprapubic catheter      S/P ORIF (open reduction internal fixation) fracture  b/l legs      S/P cataract surgery  right      H/O shoulder surgery          FAMILY HISTORY:  FH: type 2 diabetes        Allergies    No Known Allergies    Intolerances        Social History:  Denies tobacco or alcohol use    Review of Systems:   Constitutional: No weight loss, no weakness  HEENT: No visual loss, no hearing loss, no sneezing  Skin: No rash or itching  CV: No chest pain, no chest pressure  Pulm: No shortness of breath, cough, or sputum  GI: No melena, no constipation  : Per HPI  Neuro: No headache, dizziness  MSK: No muscle pain, no joint pain  Heme: No anemia, bruising, or bleeding  Lymphatics: No enlarged nodes, no history of splenectomy  Psych: No depression of anxiety  Endo: No cold or heat intolerance  Allergies: No asthma, hives    Physical Exam:  Vital signs  T(C): 36.4 (12-24-24 @ 04:55), Max: 36.8 (12-23-24 @ 21:19)  HR: 78 (12-24-24 @ 04:55)  BP: 131/77 (12-24-24 @ 04:55)  SpO2: 96% (12-24-24 @ 04:55)  Wt(kg): --    Gen: No acute distress. Normal mood  HEENT: Normocephalic, neck supple  CV: Hemodynamically stable  Pulm: No increased work of breathing  Abd: soft, non-tender, non-distended  Back: No CVA tenderness, no midline pain  Extremities: No significant deformity or joint abnormality  Neuro: Alert & oriented x3, No focal deficits  Skin: Skin normal color, normal texture  Psych: Normal affect, normal behavior  : Nonpalpable bladder, IR Sp tube in place draining cloudy yellow urine. Tube is 16Fr      Labs:      12-24 @ 07:30    WBC --    / Hct --    / SCr 7.08     12-23 @ 06:20    WBC 8.86  / Hct 32.6  / SCr 6.93     12-24    132[L]  |  87[L]  |  103[H]  ----------------------------<  107[H]  4.0   |  25  |  7.08[H]    Ca    8.2[L]      24 Dec 2024 07:30        Urinalysis Basic - ( 24 Dec 2024 07:30 )    Color: x / Appearance: x / SG: x / pH: x  Gluc: 107 mg/dL / Ketone: x  / Bili: x / Urobili: x   Blood: x / Protein: x / Nitrite: x   Leuk Esterase: x / RBC: x / WBC x   Sq Epi: x / Non Sq Epi: x / Bacteria: x

## 2024-12-24 NOTE — PROGRESS NOTE ADULT - ASSESSMENT
74 year-old man with polio with associated neurogenic bladder/suprapubic catheter, hypertension, and chronic kidney disease with base creatinine 4-5mg/dL. 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 Dat Davis will be covering for our group 12/25/24. If you have any questions, concerns or new micro data, please reach out to them at [phone].     Bridgette Ramirez M.D.  [facility], Division of Infectious Diseases  [phone]  After 5pm on weekdays and all day on weekends - please call [phone]  Available on [employer] TEAMS      74 year-old man with polio with associated neurogenic bladder/suprapubic catheter, hypertension, and chronic kidney disease with base creatinine 4-5mg/dL. 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 Dat Davis will be covering for our group 12/25/24. If you have any questions, concerns or new micro data, please reach out to them at [phone].     Bridgette Ramirez M.D.  [facility], Division of Infectious Diseases  [phone]  After 5pm on weekdays and all day on weekends - please call [phone]  Available on [employer] TEAMS

## 2024-12-24 NOTE — PROGRESS NOTE ADULT - SUBJECTIVE AND OBJECTIVE BOX
Patient is a 74y old  Male who presents with a chief complaint of SONDRA w/ metabolic acidosis  (23 Dec 2024 15:24)      SUBJECTIVE / OVERNIGHT EVENTS: sondra has improved, Scr has plateaued. Left ear otitis externa , cx growing Aspergillus, as per ID ptn should have a debridement. despite acetic acid drops ptn is still quite symptomatic and very unhappy. 5 days course of Ctx started for Ucx growing Delftia. rpt renal US: unchanged, mild b/l hydro    MEDICATIONS  (STANDING):  acetic acid 2% Solution 4 Drop(s) Left Ear every 6 hours  amLODIPine   Tablet 5 milliGRAM(s) Oral daily  atorvastatin 40 milliGRAM(s) Oral at bedtime  calcium acetate 667 milliGRAM(s) Oral three times a day with meals  cefTRIAXone   IVPB 1000 milliGRAM(s) IV Intermittent every 24 hours  furosemide    Tablet 80 milliGRAM(s) Oral daily  heparin   Injectable 5000 Unit(s) SubCutaneous every 12 hours  influenza  Vaccine (HIGH DOSE) 0.5 milliLiter(s) IntraMuscular once  pantoprazole    Tablet 40 milliGRAM(s) Oral before breakfast  sodium bicarbonate 1300 milliGRAM(s) Oral three times a day  sodium zirconium cyclosilicate 10 Gram(s) Oral daily    MEDICATIONS  (PRN):  acetaminophen     Tablet .. 650 milliGRAM(s) Oral every 6 hours PRN Temp greater or equal to 38C (100.4F), Mild Pain (1 - 3)  benzonatate 100 milliGRAM(s) Oral every 8 hours PRN Cough  ondansetron Injectable 4 milliGRAM(s) IV Push every 6 hours PRN Nausea and/or Vomiting  oxyCODONE    IR 10 milliGRAM(s) Oral every 6 hours PRN for moderate pain  zolpidem 5 milliGRAM(s) Oral at bedtime PRN Insomnia      Vital Signs Last 24 Hrs  T(F): 97.7 (12-24-24 @ 12:01), Max: 98.3 (12-23-24 @ 21:19)  HR: 70 (12-24-24 @ 12:01) (70 - 78)  BP: 139/70 (12-24-24 @ 12:01) (113/60 - 139/70)  RR: 18 (12-24-24 @ 12:01) (18 - 18)  SpO2: 94% (12-24-24 @ 12:01) (94% - 96%)  Telemetry:   CAPILLARY BLOOD GLUCOSE        I&O's Summary    23 Dec 2024 07:01  -  24 Dec 2024 07:00  --------------------------------------------------------  IN: 0 mL / OUT: 800 mL / NET: -800 mL        PHYSICAL EXAM:  GENERAL: NAD, well-developed  HEAD:  Atraumatic, Normocephalic  EYES: EOMI, PERRLA, conjunctiva and sclera clear  NECK: Supple, No JVD  CHEST/LUNG: Clear to auscultation bilaterally; No wheeze  HEART: Regular rate and rhythm; No murmurs, rubs, or gallops  ABDOMEN: Soft, Nontender, Nondistended; Bowel sounds present  EXTREMITIES:  2+ Peripheral Pulses, No clubbing, cyanosis, or edema  PSYCH: AAOx3  NEUROLOGY: non-focal  SKIN: No rashes or lesions    LABS:                        10.7   8.86  )-----------( 234      ( 23 Dec 2024 06:20 )             32.6     12-24    132[L]  |  87[L]  |  103[H]  ----------------------------<  107[H]  4.0   |  25  |  7.08[H]    Ca    8.2[L]      24 Dec 2024 07:30            Urinalysis Basic - ( 24 Dec 2024 07:30 )    Color: x / Appearance: x / SG: x / pH: x  Gluc: 107 mg/dL / Ketone: x  / Bili: x / Urobili: x   Blood: x / Protein: x / Nitrite: x   Leuk Esterase: x / RBC: x / WBC x   Sq Epi: x / Non Sq Epi: x / Bacteria: x        RADIOLOGY & ADDITIONAL TESTS:    Imaging Personally Reviewed:    Consultant(s) Notes Reviewed:      Care Discussed with Consultants/Other Providers:

## 2024-12-24 NOTE — PROGRESS NOTE ADULT - PROBLEM SELECTOR PLAN 1
- stop acetic acid otic drops (deleted)   - rec clotrimazole cream   - - stop acetic acid otic drops (discontinued)  - rec clotrimazole 1% Solution 5 drops in left ear canal BID x 2 weeks   - ENT will follow tomorrow.   - please page or call [phone] if concerns or issues.   - case reviewed and discussed with attending Dr Alvarez

## 2024-12-24 NOTE — CONSULT NOTE ADULT - ASSESSMENT
74M with multiple comorbidites, history of bowel injury during SP tube placement and recent colostomy reversal now with SONDRA  - Has 16Fr sp tube draining urine  - would get CT AP or at least renal sono to assess kidneys for presence of hydronephrosis  - if no evidence of upper tract obstruction, continued management of renal failure by nephrology  - q1 month SP tube changes by IR  - urine culture

## 2024-12-25 ENCOUNTER — TRANSCRIPTION ENCOUNTER (OUTPATIENT)
Age: 74
End: 2024-12-25

## 2024-12-25 VITALS
HEART RATE: 91 BPM | DIASTOLIC BLOOD PRESSURE: 90 MMHG | OXYGEN SATURATION: 96 % | SYSTOLIC BLOOD PRESSURE: 149 MMHG | RESPIRATION RATE: 18 BRPM | TEMPERATURE: 97 F

## 2024-12-25 LAB
ANION GAP SERPL CALC-SCNC: 18 MMOL/L — HIGH (ref 5–17)
BUN SERPL-MCNC: 97 MG/DL — HIGH (ref 7–23)
CALCIUM SERPL-MCNC: 7.9 MG/DL — LOW (ref 8.4–10.5)
CHLORIDE SERPL-SCNC: 89 MMOL/L — LOW (ref 96–108)
CO2 SERPL-SCNC: 27 MMOL/L — SIGNIFICANT CHANGE UP (ref 22–31)
CREAT SERPL-MCNC: 7.33 MG/DL — HIGH (ref 0.5–1.3)
EGFR: 7 ML/MIN/1.73M2 — LOW
GLUCOSE SERPL-MCNC: 134 MG/DL — HIGH (ref 70–99)
POTASSIUM SERPL-MCNC: 3.8 MMOL/L — SIGNIFICANT CHANGE UP (ref 3.5–5.3)
POTASSIUM SERPL-SCNC: 3.8 MMOL/L — SIGNIFICANT CHANGE UP (ref 3.5–5.3)
SODIUM SERPL-SCNC: 134 MMOL/L — LOW (ref 135–145)

## 2024-12-25 PROCEDURE — 85730 THROMBOPLASTIN TIME PARTIAL: CPT

## 2024-12-25 PROCEDURE — 87070 CULTURE OTHR SPECIMN AEROBIC: CPT

## 2024-12-25 PROCEDURE — 86706 HEP B SURFACE ANTIBODY: CPT

## 2024-12-25 PROCEDURE — 87186 SC STD MICRODIL/AGAR DIL: CPT

## 2024-12-25 PROCEDURE — 83036 HEMOGLOBIN GLYCOSYLATED A1C: CPT

## 2024-12-25 PROCEDURE — 85018 HEMOGLOBIN: CPT

## 2024-12-25 PROCEDURE — 85027 COMPLETE CBC AUTOMATED: CPT

## 2024-12-25 PROCEDURE — 83605 ASSAY OF LACTIC ACID: CPT

## 2024-12-25 PROCEDURE — 99285 EMERGENCY DEPT VISIT HI MDM: CPT

## 2024-12-25 PROCEDURE — 85014 HEMATOCRIT: CPT

## 2024-12-25 PROCEDURE — 87077 CULTURE AEROBIC IDENTIFY: CPT

## 2024-12-25 PROCEDURE — 86803 HEPATITIS C AB TEST: CPT

## 2024-12-25 PROCEDURE — 83735 ASSAY OF MAGNESIUM: CPT

## 2024-12-25 PROCEDURE — 82435 ASSAY OF BLOOD CHLORIDE: CPT

## 2024-12-25 PROCEDURE — 84443 ASSAY THYROID STIM HORMONE: CPT

## 2024-12-25 PROCEDURE — 84436 ASSAY OF TOTAL THYROXINE: CPT

## 2024-12-25 PROCEDURE — 85610 PROTHROMBIN TIME: CPT

## 2024-12-25 PROCEDURE — 71045 X-RAY EXAM CHEST 1 VIEW: CPT

## 2024-12-25 PROCEDURE — 82803 BLOOD GASES ANY COMBINATION: CPT

## 2024-12-25 PROCEDURE — 87340 HEPATITIS B SURFACE AG IA: CPT

## 2024-12-25 PROCEDURE — 76770 US EXAM ABDO BACK WALL COMP: CPT

## 2024-12-25 PROCEDURE — 87102 FUNGUS ISOLATION CULTURE: CPT

## 2024-12-25 PROCEDURE — 93970 EXTREMITY STUDY: CPT

## 2024-12-25 PROCEDURE — 36415 COLL VENOUS BLD VENIPUNCTURE: CPT

## 2024-12-25 PROCEDURE — 87205 SMEAR GRAM STAIN: CPT

## 2024-12-25 PROCEDURE — 86901 BLOOD TYPING SEROLOGIC RH(D): CPT

## 2024-12-25 PROCEDURE — 87075 CULTR BACTERIA EXCEPT BLOOD: CPT

## 2024-12-25 PROCEDURE — 84480 ASSAY TRIIODOTHYRONINE (T3): CPT

## 2024-12-25 PROCEDURE — 80053 COMPREHEN METABOLIC PANEL: CPT

## 2024-12-25 PROCEDURE — 81001 URINALYSIS AUTO W/SCOPE: CPT

## 2024-12-25 PROCEDURE — 85025 COMPLETE CBC W/AUTO DIFF WBC: CPT

## 2024-12-25 PROCEDURE — 86900 BLOOD TYPING SEROLOGIC ABO: CPT

## 2024-12-25 PROCEDURE — 87086 URINE CULTURE/COLONY COUNT: CPT

## 2024-12-25 PROCEDURE — 80048 BASIC METABOLIC PNL TOTAL CA: CPT

## 2024-12-25 PROCEDURE — 84132 ASSAY OF SERUM POTASSIUM: CPT

## 2024-12-25 PROCEDURE — 86850 RBC ANTIBODY SCREEN: CPT

## 2024-12-25 PROCEDURE — 82962 GLUCOSE BLOOD TEST: CPT

## 2024-12-25 PROCEDURE — 84100 ASSAY OF PHOSPHORUS: CPT

## 2024-12-25 PROCEDURE — 84295 ASSAY OF SERUM SODIUM: CPT

## 2024-12-25 PROCEDURE — 82330 ASSAY OF CALCIUM: CPT

## 2024-12-25 PROCEDURE — 82947 ASSAY GLUCOSE BLOOD QUANT: CPT

## 2024-12-25 PROCEDURE — 86704 HEP B CORE ANTIBODY TOTAL: CPT

## 2024-12-25 RX ORDER — CLOTRIMAZOLE 10 MG/G
5 CREAM TOPICAL
Qty: 1 | Refills: 0
Start: 2024-12-25 | End: 2025-01-07

## 2024-12-25 RX ORDER — SODIUM ZIRCONIUM CYCLOSILICATE 10 G/10G
5 POWDER, FOR SUSPENSION ORAL DAILY
Refills: 0 | Status: DISCONTINUED | OUTPATIENT
Start: 2024-12-25 | End: 2024-12-25

## 2024-12-25 RX ORDER — CALCIUM ACETATE 667 MG/1
1 CAPSULE ORAL
Qty: 90 | Refills: 0
Start: 2024-12-25 | End: 2025-01-23

## 2024-12-25 RX ORDER — PANTOPRAZOLE 40 MG/1
1 TABLET, DELAYED RELEASE ORAL
Qty: 30 | Refills: 0
Start: 2024-12-25 | End: 2025-01-23

## 2024-12-25 RX ORDER — SODIUM BICARBONATE 84 MG/ML
2 INJECTION, SOLUTION INTRAVENOUS
Qty: 180 | Refills: 0
Start: 2024-12-25 | End: 2025-01-23

## 2024-12-25 RX ORDER — CIPROFLOXACIN AND DEXAMETHASONE 3; 1 MG/ML; MG/ML
2 SUSPENSION/ DROPS AURICULAR (OTIC)
Refills: 0 | DISCHARGE

## 2024-12-25 RX ORDER — SODIUM ZIRCONIUM CYCLOSILICATE 10 G/10G
1 POWDER, FOR SUSPENSION ORAL
Qty: 30 | Refills: 0
Start: 2024-12-25 | End: 2025-01-23

## 2024-12-25 RX ORDER — FUROSEMIDE 20 MG
1 TABLET ORAL
Qty: 30 | Refills: 0
Start: 2024-12-25 | End: 2025-01-23

## 2024-12-25 RX ORDER — SULFAMETHOXAZOLE/TRIMETHOPRIM 800-160 MG
1 TABLET ORAL
Qty: 4 | Refills: 0
Start: 2024-12-25 | End: 2024-12-28

## 2024-12-25 RX ADMIN — PANTOPRAZOLE 40 MILLIGRAM(S): 40 TABLET, DELAYED RELEASE ORAL at 05:09

## 2024-12-25 RX ADMIN — SODIUM ZIRCONIUM CYCLOSILICATE 5 GRAM(S): 10 POWDER, FOR SUSPENSION ORAL at 11:15

## 2024-12-25 RX ADMIN — CALCIUM ACETATE 667 MILLIGRAM(S): 667 CAPSULE ORAL at 09:21

## 2024-12-25 RX ADMIN — Medication 5 MILLIGRAM(S): at 05:09

## 2024-12-25 RX ADMIN — Medication 80 MILLIGRAM(S): at 05:08

## 2024-12-25 RX ADMIN — HEPARIN SODIUM 5000 UNIT(S): 1000 INJECTION, SOLUTION INTRAVENOUS; SUBCUTANEOUS at 05:08

## 2024-12-25 RX ADMIN — SODIUM BICARBONATE 1300 MILLIGRAM(S): 84 INJECTION, SOLUTION INTRAVENOUS at 05:08

## 2024-12-25 RX ADMIN — SODIUM BICARBONATE 1300 MILLIGRAM(S): 84 INJECTION, SOLUTION INTRAVENOUS at 14:25

## 2024-12-25 NOTE — DISCHARGE NOTE NURSING/CASE MANAGEMENT/SOCIAL WORK - NSDCPEFALRISK_GEN_ALL_CORE
For information on Fall & Injury Prevention, visit: [email or web address] OR  [email or web address] OR  https://www.cdc.gov/steadi/patient.html

## 2024-12-25 NOTE — PROGRESS NOTE ADULT - PROVIDER SPECIALTY LIST ADULT
Infectious Disease
Infectious Disease
Internal Medicine
Internal Medicine
Nephrology
Nephrology
Vascular Surgery
ENT
ENT
Nephrology
Infectious Disease
Internal Medicine

## 2024-12-25 NOTE — PROGRESS NOTE ADULT - ASSESSMENT
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   No longer with significant uremic symptoms; therefore appears that we can hold off with HD for now    (3)Hyperkalemia - improved/stable with medical therapy    (4)Hyponatremia - renally mediated - improving on PO NaHCO3.    (5)Hyperphosphatemia - renally mediated    RECOMMEND:  (1)Continue Lasix 80mg po daily  (2)Reduce  Lokelma 5mg po qd;  Phoslo, and PO NaHCO3 as ordered  (3)No dialysis for now  (4)Follow up renal US per   (5)No objection to discharge given numbers remain relatively stable and if no other issues ensue; would plan for repeat bloodwork this Friday (12/27), and next Friday (1/3); follow up in office with Dr. 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   No longer with significant uremic symptoms; therefore appears that we can hold off with HD for now    (3)Hyperkalemia - improved/stable with medical therapy    (4)Hyponatremia - renally mediated - improving on PO NaHCO3.    (5)Hyperphosphatemia - renally mediated    RECOMMEND:  (1)Continue Lasix 80mg po daily  (2)Reduce  Lokelma 5mg po qd;  Phoslo, and PO NaHCO3 as ordered  (3)No dialysis for now  (4)Follow up renal US per   (5)No objection to discharge given numbers remain relatively stable  with Dr. Denny on Tuesday 1/7 at 12pm      [facility]   [phone]

## 2024-12-25 NOTE — PROGRESS NOTE ADULT - PROBLEM SELECTOR PLAN 1
- c/w clotrimazole 1% Solution 5 drops in left ear canal BID x 2 weeks   - avoid getting left ear wet or submerged in water.   - avoid using ear plugs in left ear.   - please page or call [phone] if concerns or issues.   - case reviewed and discussed with attending Dr Alvarez.

## 2024-12-25 NOTE — PROGRESS NOTE ADULT - SUBJECTIVE AND OBJECTIVE BOX
NEPHROLOGY-[facility] [phone]        Patient seen and examined in bed.          MEDICATIONS  (STANDING):  amLODIPine   Tablet 5 milliGRAM(s) Oral daily  atorvastatin 40 milliGRAM(s) Oral at bedtime  calcium acetate 667 milliGRAM(s) Oral three times a day with meals  cefTRIAXone   IVPB 1000 milliGRAM(s) IV Intermittent every 24 hours  Clotrimazole 1% Solution 5 Drop(s) 5 Drop(s) Left Ear two times a day  furosemide    Tablet 80 milliGRAM(s) Oral daily  heparin   Injectable 5000 Unit(s) SubCutaneous every 12 hours  influenza  Vaccine (HIGH DOSE) 0.5 milliLiter(s) IntraMuscular once  pantoprazole    Tablet 40 milliGRAM(s) Oral before breakfast  sodium bicarbonate 1300 milliGRAM(s) Oral three times a day  sodium zirconium cyclosilicate 10 Gram(s) Oral daily      VITAL:  T(C): , Max: 36.7 (12-25-24 @ 04:08)  T(F): , Max: 98.1 (12-25-24 @ 04:08)  HR: 86 (12-25-24 @ 04:08)  BP: 124/87 (12-25-24 @ 04:08)  BP(mean): --  RR: 18 (12-25-24 @ 04:08)  SpO2: 96% (12-25-24 @ 04:08)  Wt(kg): --    I and O's:    12-24 @ 07:01  -  12-25 @ 07:00  --------------------------------------------------------  IN: 0 mL / OUT: 600 mL / NET: -600 mL          PHYSICAL EXAM:    Constitutional: NAD  Neck:  No JVD  Respiratory: CTAB/L  Cardiovascular: S1 and S2  Gastrointestinal: BS+, soft, NT/ND  Extremities: No peripheral edema  Neurological: A/O x 3, no focal deficits  Psychiatric: Normal mood, normal affect  : No Murillo  Skin: No rashes  Access: Not applicable    LABS:    12-25    134[L]  |  89[L]  |  97[H]  ----------------------------<  134[H]  3.8   |  27  |  7.33[H]    Ca    7.9[L]      25 Dec 2024 05:44            Urine Studies:  Urinalysis Basic - ( 25 Dec 2024 05:44 )    Color: x / Appearance: x / SG: x / pH: x  Gluc: 134 mg/dL / Ketone: x  / Bili: x / Urobili: x   Blood: x / Protein: x / Nitrite: x   Leuk Esterase: x / RBC: x / WBC x   Sq Epi: x / Non Sq Epi: x / Bacteria: x            RADIOLOGY & ADDITIONAL STUDIES:             NEPHROLOGY[facility] [phone]        Patient seen and examined in bed.  He was the same         MEDICATIONS  (STANDING):  amLODIPine   Tablet 5 milliGRAM(s) Oral daily  atorvastatin 40 milliGRAM(s) Oral at bedtime  calcium acetate 667 milliGRAM(s) Oral three times a day with meals  cefTRIAXone   IVPB 1000 milliGRAM(s) IV Intermittent every 24 hours  Clotrimazole 1% Solution 5 Drop(s) 5 Drop(s) Left Ear two times a day  furosemide    Tablet 80 milliGRAM(s) Oral daily  heparin   Injectable 5000 Unit(s) SubCutaneous every 12 hours  influenza  Vaccine (HIGH DOSE) 0.5 milliLiter(s) IntraMuscular once  pantoprazole    Tablet 40 milliGRAM(s) Oral before breakfast  sodium bicarbonate 1300 milliGRAM(s) Oral three times a day  sodium zirconium cyclosilicate 10 Gram(s) Oral daily      VITAL:  T(C): , Max: 36.7 (12-25-24 @ 04:08)  T(F): , Max: 98.1 (12-25-24 @ 04:08)  HR: 86 (12-25-24 @ 04:08)  BP: 124/87 (12-25-24 @ 04:08)  BP(mean): --  RR: 18 (12-25-24 @ 04:08)  SpO2: 96% (12-25-24 @ 04:08)  Wt(kg): --    I and O's:    12-24 @ 07:01  -  12-25 @ 07:00  --------------------------------------------------------  IN: 0 mL / OUT: 600 mL / NET: -600 mL          PHYSICAL EXAM:    Constitutional: NAD;  large frame   Neck:  No JVD  Respiratory: reduced   Cardiovascular: S1 and S2  Gastrointestinal: BS+, soft, NT/ND  Extremities: No peripheral edema  Neurological: A/O x 3, no focal deficits  Psychiatric: Normal mood, normal affect  : No Murillo  Skin: No rashes  Access: Not applicable    LABS:    12-25    134[L]  |  89[L]  |  97[H]  ----------------------------<  134[H]  3.8   |  27  |  7.33[H]    Ca    7.9[L]      25 Dec 2024 05:44            Urine Studies:  Urinalysis Basic - ( 25 Dec 2024 05:44 )    Color: x / Appearance: x / SG: x / pH: x  Gluc: 134 mg/dL / Ketone: x  / Bili: x / Urobili: x   Blood: x / Protein: x / Nitrite: x   Leuk Esterase: x / RBC: x / WBC x   Sq Epi: x / Non Sq Epi: x / Bacteria: x            RADIOLOGY & ADDITIONAL STUDIES:

## 2024-12-25 NOTE — DISCHARGE NOTE NURSING/CASE MANAGEMENT/SOCIAL WORK - PATIENT PORTAL LINK FT
You can access the FollowMyHealth Patient Portal offered by [facility] by registering at the following website: [email or web address]. By joining [employer]’s FollowMyHealth portal, you will also be able to view your health information using other applications (apps) compatible with our system.

## 2024-12-25 NOTE — PROGRESS NOTE ADULT - ASSESSMENT
74 year-old man with history of anemia of chronic dz, CKD4, Scr 4 at baseline, HTN, polio, wheelchair bound, DVT(RLE) 5/2024, BPH w LUTS, 2/2024 bladder outlet obstruction ( 2/2 neurogenic bladder) followed by suprapubic catheter placement complicated by rectal rupture requiring a diverting sigmoid loop colostomy reversal of which was done in November 2024 at another hospital  Ptn lives w a chronic suprapubic catheter   on 12/20  ptn presents  to the [facility] ER with worsening azotemia    Family states ever since the  colostomy reversal on 11/20/24.  he has not been himself.   His urine output has been reduced - 400cc/d rather then 2L/d. His urine has had a green tinge to it. He has had a sensation of suprapubic pain and fullness. Additionally, he has suffered from generalized weakness, muted voice, and loss of appetite for the past few weeks, and some nausea over the past few days. He has had moderate to severe back pain for the past several weeks; his family was worried this may be due to his kidneys; he suspects that it is due to having a poor mattress to sleep on. He underwent suprapubic catheter change on Monday 12/16/24; since then, his urine output has returned to his baseline of ~2L/day.     Bloodwork performed with his PCP Dr. Mcdowell on 11/29/24 revealed azotemia, ptn was sent by outptn nephrologist to the ED    Here Scr 7.51, pH7.22, HCO3 17, K 7.5, Na 129    Ptn seen by renal Dr. 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   He is uremic however no urgent need for HD       s/p suprapubic exchange 12/16/24.  12/23; ongoing pain in Left ear, ENT following, no intervention done exc acetic acid drops. ptn is very unhappy further intervention hasn't been done. patient family center is involved.   12/24: sondra has improved, Scr has plateaued. Left ear otitis externa , cx growing Aspergillus, as per ID ptn should have a debridement. left a teams message with Dr. Alvarez of ENT ( attending) . despite acetic acid drops ptn is still quite symptomatic and very unhappy. his hearing is off.   cystitis 2/2 chronic suprapubic catheter. 5 days course of Ctx started for Ucx growing Delftia. rpt renal US: unchanged, mild b/l hydro  12/25:  left ear debridement done. feels much better. dc home today  hyperkalemia resolved, cont po Bicarb.   off bumex and lokelma  nausea resolved  cont outptn meds  hold all nephrotoxic agents  DVT ppx w HSC  GI ppx w PPI  DC planning w outptn , ENT and Renal f/u

## 2024-12-25 NOTE — DISCHARGE NOTE NURSING/CASE MANAGEMENT/SOCIAL WORK - FINANCIAL ASSISTANCE
[facility] provides services at a reduced cost to those who are determined to be eligible through [facility]’s financial assistance program. Information regarding [facility]’s financial assistance program can be found by going to [email or web address] or by calling [phone].

## 2024-12-25 NOTE — PROGRESS NOTE ADULT - SUBJECTIVE AND OBJECTIVE BOX
ENT ISSUE/POD: left ear fungal infection     74 year old, A&Ox4 M, with PMHx of polio with associated neurogenic bladder/superpubic catherter, HTN, HLD, CKD, presented to ED on 12/20 for worsening azotemia. ENT consulted for 1 month history of left ear pain, which hasn't improved after using ear drops and abx prescribed by his PCP. On initial otoscope exam, noted left ear canal with white and black debris. 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 Bacterial culture prelim shows numerous mold like fungus. Pt has been on acetic ear drop for 5 days. However, pt continues to experience significant left ear pain. Last night, s/p suctioning of black debris from left ear canal. ear drops have been changed to clotrimazole 1% solution. Pt seen and examined at bedside this morning, endorses feeling much better after removal of black debris last night. Still have some ear pain, but improved.    PAST MEDICAL & SURGICAL HISTORY:  Polio  Hypertensio  H/O urinary retention  suprapubic tube  HLD (hyperlipidemia)  BPH with obstruction/lower urinary tract symptoms  Erectile dysfunction  Bladder outlet obstruction  Presence of suprapubic catheter  Colostomy status  DVT, lower extremity  H/O cardiac murmur  S/P colostomy 2/2024  Suprapubic catheter  S/P ORIF (open reduction internal fixation) fracture b/l legs  S/P cataract surgery right  H/O shoulder surgery    Allergies  No Known Allergies    MEDICATIONS  (STANDING):  amLODIPine   Tablet 5 milliGRAM(s) Oral daily  atorvastatin 40 milliGRAM(s) Oral at bedtime  calcium acetate 667 milliGRAM(s) Oral three times a day with meals  cefTRIAXone   IVPB 1000 milliGRAM(s) IV Intermittent every 24 hours  Clotrimazole 1% Solution 5 Drop(s) 5 Drop(s) Left Ear two times a day  furosemide    Tablet 80 milliGRAM(s) Oral daily  heparin   Injectable 5000 Unit(s) SubCutaneous every 12 hours  influenza  Vaccine (HIGH DOSE) 0.5 milliLiter(s) IntraMuscular once  pantoprazole    Tablet 40 milliGRAM(s) Oral before breakfast  sodium bicarbonate 1300 milliGRAM(s) Oral three times a day  sodium zirconium cyclosilicate 10 Gram(s) Oral daily    MEDICATIONS  (PRN):  acetaminophen     Tablet .. 650 milliGRAM(s) Oral every 6 hours PRN Temp greater or equal to 38C (100.4F), Mild Pain (1 - 3)  benzonatate 100 milliGRAM(s) Oral every 8 hours PRN Cough  ondansetron Injectable 4 milliGRAM(s) IV Push every 6 hours PRN Nausea and/or Vomiting  oxyCODONE    IR 10 milliGRAM(s) Oral every 6 hours PRN for moderate pain  zolpidem 5 milliGRAM(s) Oral at bedtime PRN Insomnia    Social History: see consult note  Family history: see consult note    ROS:   ENT: all negative except as noted in HPI   Pulm: denies SOB, cough, hemoptysis  Neuro: denies numbness/tingling, loss of sensation  Endo: denies heat/cold intolerance, excessive sweating    Vital Signs Last 24 Hrs  T(C): 36.7 (25 Dec 2024 04:08), Max: 36.7 (25 Dec 2024 04:08)  T(F): 98.1 (25 Dec 2024 04:08), Max: 98.1 (25 Dec 2024 04:08)  HR: 86 (25 Dec 2024 04:08) (59 - 86)  BP: 124/87 (25 Dec 2024 04:08) (108/71 - 147/77)  BP(mean): --  RR: 18 (25 Dec 2024 04:08) (18 - 18)  SpO2: 96% (25 Dec 2024 04:08) (94% - 97%)    Parameters below as of 25 Dec 2024 04:08  Patient On (Oxygen Delivery Method): room air                        10.7   8.86  )-----------( 234      ( 23 Dec 2024 06:20 )             32.6    12-24    132[L]  |  87[L]  |  103[H]  ----------------------------<  107[H]  4.0   |  25  |  7.08[H]    Ca    8.2[L]      24 Dec 2024 07:30    PHYSICAL EXAM:  Gen: NAD  Skin: No rashes, bruises, or lesions  Head: Normocephalic, Atraumatic  Face: no edema, erythema, or fluctuance. Parotid glands soft without mass  Eyes: no scleral injection  Ears: Right: ear canal clear, TM intact without effusion or erythema. No evidence of any fluid drainage. No mastoid tenderness, erythema, or ear bulging          Left: granulation tissue in poserior ear canal. unable to visualize TM. no swelling or erythema in external canal.   Nose: Nares bilaterally patent, no discharge  Mouth: No Stridor / Drooling / Trismus.  Mucosa moist, tongue/uvula midline, oropharynx clear  Neck: Flat, supple, no lymphadenopathy, trachea midline, no masses  Lymphatic: No lymphadenopathy  Resp: breathing easily, no stridor  Neuro: no facial droop ENT ISSUE/POD: left ear fungal infection     74 year old, A&Ox4 M, with PMHx of polio with associated neurogenic bladder/superpubic catherter, HTN, HLD, CKD, presented to ED on 12/20 for worsening azotemia. ENT consulted for 1 month history of left ear pain, which hasn't improved after using ear drops and abx prescribed by his PCP. On initial otoscope exam, noted left ear canal with white and black debris. 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 Bacterial culture prelim shows numerous mold like fungus. Pt has been on acetic ear drop for 5 days. However, pt continues to experience significant left ear pain. On 12/24 s/p suctioning of black debris from left ear canal. ear drops have been changed to clotrimazole 1% solution. Pt seen and examined at bedside this morning, states improvement of pain.    PAST MEDICAL & SURGICAL HISTORY:  Polio  Hypertensio  H/O urinary retention  suprapubic tube  HLD (hyperlipidemia)  BPH with obstruction/lower urinary tract symptoms  Erectile dysfunction  Bladder outlet obstruction  Presence of suprapubic catheter  Colostomy status  DVT, lower extremity  H/O cardiac murmur  S/P colostomy 2/2024  Suprapubic catheter  S/P ORIF (open reduction internal fixation) fracture b/l legs  S/P cataract surgery right  H/O shoulder surgery    Allergies  No Known Allergies    MEDICATIONS  (STANDING):  amLODIPine   Tablet 5 milliGRAM(s) Oral daily  atorvastatin 40 milliGRAM(s) Oral at bedtime  calcium acetate 667 milliGRAM(s) Oral three times a day with meals  cefTRIAXone   IVPB 1000 milliGRAM(s) IV Intermittent every 24 hours  Clotrimazole 1% Solution 5 Drop(s) 5 Drop(s) Left Ear two times a day  furosemide    Tablet 80 milliGRAM(s) Oral daily  heparin   Injectable 5000 Unit(s) SubCutaneous every 12 hours  influenza  Vaccine (HIGH DOSE) 0.5 milliLiter(s) IntraMuscular once  pantoprazole    Tablet 40 milliGRAM(s) Oral before breakfast  sodium bicarbonate 1300 milliGRAM(s) Oral three times a day  sodium zirconium cyclosilicate 10 Gram(s) Oral daily    MEDICATIONS  (PRN):  acetaminophen     Tablet .. 650 milliGRAM(s) Oral every 6 hours PRN Temp greater or equal to 38C (100.4F), Mild Pain (1 - 3)  benzonatate 100 milliGRAM(s) Oral every 8 hours PRN Cough  ondansetron Injectable 4 milliGRAM(s) IV Push every 6 hours PRN Nausea and/or Vomiting  oxyCODONE    IR 10 milliGRAM(s) Oral every 6 hours PRN for moderate pain  zolpidem 5 milliGRAM(s) Oral at bedtime PRN Insomnia    Social History: see consult note  Family history: see consult note    ROS:   ENT: all negative except as noted in HPI   Pulm: denies SOB, cough, hemoptysis  Neuro: denies numbness/tingling, loss of sensation  Endo: denies heat/cold intolerance, excessive sweating    Vital Signs Last 24 Hrs  T(C): 36.7 (25 Dec 2024 04:08), Max: 36.7 (25 Dec 2024 04:08)  T(F): 98.1 (25 Dec 2024 04:08), Max: 98.1 (25 Dec 2024 04:08)  HR: 86 (25 Dec 2024 04:08) (59 - 86)  BP: 124/87 (25 Dec 2024 04:08) (108/71 - 147/77)  BP(mean): --  RR: 18 (25 Dec 2024 04:08) (18 - 18)  SpO2: 96% (25 Dec 2024 04:08) (94% - 97%)    Parameters below as of 25 Dec 2024 04:08  Patient On (Oxygen Delivery Method): room air                        10.7   8.86  )-----------( 234      ( 23 Dec 2024 06:20 )             32.6    12-24    132[L]  |  87[L]  |  103[H]  ----------------------------<  107[H]  4.0   |  25  |  7.08[H]    Ca    8.2[L]      24 Dec 2024 07:30    PHYSICAL EXAM:  Gen: NAD  Skin: No rashes, bruises, or lesions  Head: Normocephalic, Atraumatic  Face: no edema, erythema, or fluctuance. Parotid glands soft without mass  Eyes: no scleral injection  Ears: Right: ear canal clear, TM intact without effusion or erythema. No evidence of any fluid drainage. No mastoid tenderness, erythema, or ear bulging          Left: granulation tissue and black debris suctioned from ear canal. No swelling or erythema in external canal. TM intact, no erythema  Nose: Nares bilaterally patent, no discharge  Mouth: No Stridor / Drooling / Trismus.  Mucosa moist, tongue/uvula midline, oropharynx clear  Neck: Flat, supple, no lymphadenopathy, trachea midline, no masses  Lymphatic: No lymphadenopathy  Resp: breathing easily, no stridor  Neuro: no facial droop

## 2024-12-25 NOTE — PROGRESS NOTE ADULT - ASSESSMENT
74 year old, A&Ox4 M, with PMHx of polio with associated neurogenic bladder/superpubic catherter, HTN, HLD, CKD, presented to ED on 12/20 for worsening azotemia. ENT has been on board for management left ear fungal infection. Ear fungal culture(12/20) confirmed moderate aspergillus niger complex. Ear bacterial culture prelime shows numerous mold like fungus. pt has been on acetic acid otic drops with much pain improvement.Pt was examined last night, s/p complete suctioning of black debris.ear drop has been switched to clotrimazole 1% solution. On exam today, noted granulation tissue in posterior ear canal. no swelling or erythema in external canal. pt also endorses feeling better.    74 year old, A&Ox4 M, with PMHx of polio with associated neurogenic bladder/superpubic catherter, HTN, HLD, CKD, presented to ED on 12/20 for worsening azotemia. ENT has been on board for management left ear fungal infection. Ear fungal culture(12/20) confirmed moderate aspergillus niger complex. Ear bacterial culture prelime shows numerous mold like fungus. Pt was on acetic acid otic drops x5 days with minimal pain improvement. Pt s/p suctioning of black debris last night. Ear drop has been switched to clotrimazole 1% solution. Pt states improvement of pain. On exam today, granulation tissue and black debris suctioned from posterior ear canal, clotrimazole drops placed into EAC. no swelling or erythema in external canal.

## 2024-12-25 NOTE — DISCHARGE NOTE NURSING/CASE MANAGEMENT/SOCIAL WORK - [facility]SDCFUADDAPPT_GEN_ALL_CORE_FT
APPTS ARE READY TO BE MADE: [X] YES    Best Family or Patient Contact (if needed):    Additional Information about above appointments (if needed):    1: PCP in 1-3 days   2: nephrology on friday 12/27   3: ENT in 1 week     Other comments or requests:

## 2024-12-25 NOTE — PROGRESS NOTE ADULT - SUBJECTIVE AND OBJECTIVE BOX
Patient is a 74y old  Male who presents with a chief complaint of SONDRA w/ metabolic acidosis  (23 Dec 2024 15:24)      SUBJECTIVE / OVERNIGHT EVENTS: left ear debridement done. feels much better. dc home today    MEDICATIONS  (STANDING):  amLODIPine   Tablet 5 milliGRAM(s) Oral daily  atorvastatin 40 milliGRAM(s) Oral at bedtime  calcium acetate 667 milliGRAM(s) Oral three times a day with meals  cefTRIAXone   IVPB 1000 milliGRAM(s) IV Intermittent every 24 hours  Clotrimazole 1% Solution 5 Drop(s) 5 Drop(s) Left Ear two times a day  furosemide    Tablet 80 milliGRAM(s) Oral daily  heparin   Injectable 5000 Unit(s) SubCutaneous every 12 hours  influenza  Vaccine (HIGH DOSE) 0.5 milliLiter(s) IntraMuscular once  pantoprazole    Tablet 40 milliGRAM(s) Oral before breakfast  sodium bicarbonate 1300 milliGRAM(s) Oral three times a day  sodium zirconium cyclosilicate 5 Gram(s) Oral daily    MEDICATIONS  (PRN):  acetaminophen     Tablet .. 650 milliGRAM(s) Oral every 6 hours PRN Temp greater or equal to 38C (100.4F), Mild Pain (1 - 3)  benzonatate 100 milliGRAM(s) Oral every 8 hours PRN Cough  ondansetron Injectable 4 milliGRAM(s) IV Push every 6 hours PRN Nausea and/or Vomiting  oxyCODONE    IR 10 milliGRAM(s) Oral every 6 hours PRN for moderate pain  zolpidem 5 milliGRAM(s) Oral at bedtime PRN Insomnia      Vital Signs Last 24 Hrs  T(F): 97.4 (12-25-24 @ 11:29), Max: 98.1 (12-25-24 @ 04:08)  HR: 91 (12-25-24 @ 11:29) (72 - 91)  BP: 149/90 (12-25-24 @ 11:29) (108/71 - 149/90)  RR: 18 (12-25-24 @ 11:29) (18 - 18)  SpO2: 96% (12-25-24 @ 11:29) (95% - 96%)  Telemetry:   CAPILLARY BLOOD GLUCOSE        I&O's Summary    24 Dec 2024 07:01  -  25 Dec 2024 07:00  --------------------------------------------------------  IN: 0 mL / OUT: 600 mL / NET: -600 mL        PHYSICAL EXAM:  GENERAL: NAD, well-developed  HEAD:  Atraumatic, Normocephalic  EYES: EOMI, PERRLA, conjunctiva and sclera clear  NECK: Supple, No JVD  CHEST/LUNG: Clear to auscultation bilaterally; No wheeze  HEART: Regular rate and rhythm; No murmurs, rubs, or gallops  ABDOMEN: Soft, Nontender, Nondistended; Bowel sounds present  EXTREMITIES:  2+ Peripheral Pulses, No clubbing, cyanosis, or edema  PSYCH: AAOx3  NEUROLOGY: non-focal  SKIN: No rashes or lesions    LABS:    12-25    134[L]  |  89[L]  |  97[H]  ----------------------------<  134[H]  3.8   |  27  |  7.33[H]    Ca    7.9[L]      25 Dec 2024 05:44            Urinalysis Basic - ( 25 Dec 2024 05:44 )    Color: x / Appearance: x / SG: x / pH: x  Gluc: 134 mg/dL / Ketone: x  / Bili: x / Urobili: x   Blood: x / Protein: x / Nitrite: x   Leuk Esterase: x / RBC: x / WBC x   Sq Epi: x / Non Sq Epi: x / Bacteria: x        RADIOLOGY & ADDITIONAL TESTS:    Imaging Personally Reviewed:    Consultant(s) Notes Reviewed:      Care Discussed with Consultants/Other Providers:

## 2024-12-28 LAB
CULTURE RESULTS: ABNORMAL
SPECIMEN SOURCE: SIGNIFICANT CHANGE UP

## 2025-01-01 ENCOUNTER — RESULT REVIEW (OUTPATIENT)
Age: 75
End: 2025-01-01

## 2025-01-01 ENCOUNTER — TRANSCRIPTION ENCOUNTER (OUTPATIENT)
Age: 75
End: 2025-01-01

## 2025-01-01 ENCOUNTER — INPATIENT (INPATIENT)
Facility: HOSPITAL | Age: 75
LOS: 59 days | DRG: 673 | End: 2025-04-17
Attending: INTERNAL MEDICINE | Admitting: INTERNAL MEDICINE
Payer: MEDICARE

## 2025-01-01 VITALS
HEIGHT: 68 IN | RESPIRATION RATE: 20 BRPM | DIASTOLIC BLOOD PRESSURE: 78 MMHG | WEIGHT: 199.96 LBS | SYSTOLIC BLOOD PRESSURE: 143 MMHG | TEMPERATURE: 98 F | HEART RATE: 94 BPM | OXYGEN SATURATION: 99 %

## 2025-01-01 VITALS — HEART RATE: 110 BPM | SYSTOLIC BLOOD PRESSURE: 91 MMHG | DIASTOLIC BLOOD PRESSURE: 40 MMHG

## 2025-01-01 DIAGNOSIS — R68.89 OTHER GENERAL SYMPTOMS AND SIGNS: ICD-10-CM

## 2025-01-01 DIAGNOSIS — M25.561 PAIN IN RIGHT KNEE: ICD-10-CM

## 2025-01-01 DIAGNOSIS — Z98.49 CATARACT EXTRACTION STATUS, UNSPECIFIED EYE: Chronic | ICD-10-CM

## 2025-01-01 DIAGNOSIS — Z71.89 OTHER SPECIFIED COUNSELING: ICD-10-CM

## 2025-01-01 DIAGNOSIS — Z93.59 OTHER CYSTOSTOMY STATUS: Chronic | ICD-10-CM

## 2025-01-01 DIAGNOSIS — G93.49 OTHER ENCEPHALOPATHY: ICD-10-CM

## 2025-01-01 DIAGNOSIS — I95.9 HYPOTENSION, UNSPECIFIED: ICD-10-CM

## 2025-01-01 DIAGNOSIS — R25.1 TREMOR, UNSPECIFIED: ICD-10-CM

## 2025-01-01 DIAGNOSIS — N18.6 END STAGE RENAL DISEASE: ICD-10-CM

## 2025-01-01 DIAGNOSIS — R52 PAIN, UNSPECIFIED: ICD-10-CM

## 2025-01-01 DIAGNOSIS — Z98.890 OTHER SPECIFIED POSTPROCEDURAL STATES: Chronic | ICD-10-CM

## 2025-01-01 DIAGNOSIS — E78.5 HYPERLIPIDEMIA, UNSPECIFIED: ICD-10-CM

## 2025-01-01 DIAGNOSIS — J98.6 DISORDERS OF DIAPHRAGM: ICD-10-CM

## 2025-01-01 DIAGNOSIS — D72.829 ELEVATED WHITE BLOOD CELL COUNT, UNSPECIFIED: ICD-10-CM

## 2025-01-01 DIAGNOSIS — A80.9 ACUTE POLIOMYELITIS, UNSPECIFIED: ICD-10-CM

## 2025-01-01 DIAGNOSIS — Z93.3 COLOSTOMY STATUS: Chronic | ICD-10-CM

## 2025-01-01 DIAGNOSIS — Z51.5 ENCOUNTER FOR PALLIATIVE CARE: ICD-10-CM

## 2025-01-01 DIAGNOSIS — L89.90 PRESSURE ULCER OF UNSPECIFIED SITE, UNSPECIFIED STAGE: ICD-10-CM

## 2025-01-01 DIAGNOSIS — R41.82 ALTERED MENTAL STATUS, UNSPECIFIED: ICD-10-CM

## 2025-01-01 DIAGNOSIS — G89.3 NEOPLASM RELATED PAIN (ACUTE) (CHRONIC): ICD-10-CM

## 2025-01-01 DIAGNOSIS — S82.009A UNSPECIFIED FRACTURE OF UNSPECIFIED PATELLA, INITIAL ENCOUNTER FOR CLOSED FRACTURE: ICD-10-CM

## 2025-01-01 DIAGNOSIS — S72.413A DISPLACED UNSPECIFIED CONDYLE FRACTURE OF LOWER END OF UNSPECIFIED FEMUR, INITIAL ENCOUNTER FOR CLOSED FRACTURE: ICD-10-CM

## 2025-01-01 DIAGNOSIS — I10 ESSENTIAL (PRIMARY) HYPERTENSION: ICD-10-CM

## 2025-01-01 DIAGNOSIS — R06.00 DYSPNEA, UNSPECIFIED: ICD-10-CM

## 2025-01-01 DIAGNOSIS — J18.9 PNEUMONIA, UNSPECIFIED ORGANISM: ICD-10-CM

## 2025-01-01 DIAGNOSIS — I73.9 PERIPHERAL VASCULAR DISEASE, UNSPECIFIED: ICD-10-CM

## 2025-01-01 LAB
-  AMOXICILLIN/CLAVULANIC ACID: SIGNIFICANT CHANGE UP
-  AMPICILLIN/SULBACTAM: SIGNIFICANT CHANGE UP
-  AMPICILLIN: SIGNIFICANT CHANGE UP
-  AMPICILLIN: SIGNIFICANT CHANGE UP
-  AZTREONAM: SIGNIFICANT CHANGE UP
-  AZTREONAM: SIGNIFICANT CHANGE UP
-  CEFAZOLIN: SIGNIFICANT CHANGE UP
-  CEFEPIME: SIGNIFICANT CHANGE UP
-  CEFEPIME: SIGNIFICANT CHANGE UP
-  CEFTAZIDIME/AVIBACTAM: SIGNIFICANT CHANGE UP
-  CEFTAZIDIME: SIGNIFICANT CHANGE UP
-  CEFTOLOZANE/TAZOBACTAM: SIGNIFICANT CHANGE UP
-  CEFTRIAXONE: SIGNIFICANT CHANGE UP
-  CIPROFLOXACIN: SIGNIFICANT CHANGE UP
-  CIPROFLOXACIN: SIGNIFICANT CHANGE UP
-  DAPTOMYCIN: SIGNIFICANT CHANGE UP
-  ERTAPENEM: SIGNIFICANT CHANGE UP
-  GENTAMICIN: SIGNIFICANT CHANGE UP
-  IMIPENEM: SIGNIFICANT CHANGE UP
-  IMIPENEM: SIGNIFICANT CHANGE UP
-  LEVOFLOXACIN: SIGNIFICANT CHANGE UP
-  LEVOFLOXACIN: SIGNIFICANT CHANGE UP
-  LINEZOLID: SIGNIFICANT CHANGE UP
-  MEROPENEM: SIGNIFICANT CHANGE UP
-  MEROPENEM: SIGNIFICANT CHANGE UP
-  PIPERACILLIN/TAZOBACTAM: SIGNIFICANT CHANGE UP
-  PIPERACILLIN/TAZOBACTAM: SIGNIFICANT CHANGE UP
-  RESISTANCE GENE KPC: SIGNIFICANT CHANGE UP
-  TOBRAMYCIN: SIGNIFICANT CHANGE UP
-  TRIMETHOPRIM/SULFAMETHOXAZOLE: SIGNIFICANT CHANGE UP
-  VANCOMYCIN: SIGNIFICANT CHANGE UP
ADD ON TEST-SPECIMEN IN LAB: SIGNIFICANT CHANGE UP
ALBUMIN SERPL ELPH-MCNC: 2.4 G/DL — LOW (ref 3.3–5)
ALBUMIN SERPL ELPH-MCNC: 2.5 G/DL — LOW (ref 3.3–5)
ALBUMIN SERPL ELPH-MCNC: 2.5 G/DL — LOW (ref 3.3–5)
ALBUMIN SERPL ELPH-MCNC: 2.6 G/DL — LOW (ref 3.3–5)
ALBUMIN SERPL ELPH-MCNC: 3.2 G/DL — LOW (ref 3.3–5)
ALP SERPL-CCNC: 102 U/L — SIGNIFICANT CHANGE UP (ref 40–120)
ALP SERPL-CCNC: 103 U/L — SIGNIFICANT CHANGE UP (ref 40–120)
ALP SERPL-CCNC: 104 U/L — SIGNIFICANT CHANGE UP (ref 40–120)
ALP SERPL-CCNC: 159 U/L — HIGH (ref 40–120)
ALP SERPL-CCNC: 88 U/L — SIGNIFICANT CHANGE UP (ref 40–120)
ALP SERPL-CCNC: 88 U/L — SIGNIFICANT CHANGE UP (ref 40–120)
ALP SERPL-CCNC: 90 U/L — SIGNIFICANT CHANGE UP (ref 40–120)
ALP SERPL-CCNC: 96 U/L — SIGNIFICANT CHANGE UP (ref 40–120)
ALT FLD-CCNC: 5 U/L — LOW (ref 10–45)
ALT FLD-CCNC: 6 U/L — LOW (ref 10–45)
ALT FLD-CCNC: 6 U/L — LOW (ref 10–45)
ALT FLD-CCNC: 7 U/L — LOW (ref 10–45)
ALT FLD-CCNC: <5 U/L — LOW (ref 10–45)
ALT FLD-CCNC: <5 U/L — LOW (ref 10–45)
ANION GAP SERPL CALC-SCNC: 10 MMOL/L — SIGNIFICANT CHANGE UP (ref 5–17)
ANION GAP SERPL CALC-SCNC: 11 MMOL/L — SIGNIFICANT CHANGE UP (ref 5–17)
ANION GAP SERPL CALC-SCNC: 12 MMOL/L — SIGNIFICANT CHANGE UP (ref 5–17)
ANION GAP SERPL CALC-SCNC: 13 MMOL/L — SIGNIFICANT CHANGE UP (ref 5–17)
ANION GAP SERPL CALC-SCNC: 14 MMOL/L — SIGNIFICANT CHANGE UP (ref 5–17)
ANION GAP SERPL CALC-SCNC: 15 MMOL/L — SIGNIFICANT CHANGE UP (ref 5–17)
ANION GAP SERPL CALC-SCNC: 16 MMOL/L — SIGNIFICANT CHANGE UP (ref 5–17)
ANION GAP SERPL CALC-SCNC: 16 MMOL/L — SIGNIFICANT CHANGE UP (ref 5–17)
ANION GAP SERPL CALC-SCNC: 18 MMOL/L — HIGH (ref 5–17)
ANION GAP SERPL CALC-SCNC: 19 MMOL/L — HIGH (ref 5–17)
ANION GAP SERPL CALC-SCNC: 19 MMOL/L — HIGH (ref 5–17)
ANION GAP SERPL CALC-SCNC: 20 MMOL/L — HIGH (ref 5–17)
ANION GAP SERPL CALC-SCNC: 9 MMOL/L — SIGNIFICANT CHANGE UP (ref 5–17)
ANISOCYTOSIS BLD QL: SLIGHT — SIGNIFICANT CHANGE UP
APPEARANCE UR: ABNORMAL
APTT BLD: 102.5 SEC — HIGH (ref 24.5–35.6)
APTT BLD: 102.6 SEC — HIGH (ref 24.5–35.6)
APTT BLD: 122.4 SEC — CRITICAL HIGH (ref 24.5–35.6)
APTT BLD: 152.6 SEC — CRITICAL HIGH (ref 24.5–35.6)
APTT BLD: 152.9 SEC — CRITICAL HIGH (ref 24.5–35.6)
APTT BLD: 196.7 SEC — CRITICAL HIGH (ref 24.5–35.6)
APTT BLD: 31.1 SEC — SIGNIFICANT CHANGE UP (ref 24.5–35.6)
APTT BLD: 32.3 SEC — SIGNIFICANT CHANGE UP (ref 24.5–35.6)
APTT BLD: 36 SEC — HIGH (ref 24.5–35.6)
APTT BLD: 49.7 SEC — HIGH (ref 24.5–35.6)
APTT BLD: 55 SEC — HIGH (ref 24.5–35.6)
APTT BLD: 69.6 SEC — HIGH (ref 24.5–35.6)
APTT BLD: 87.9 SEC — HIGH (ref 24.5–35.6)
APTT BLD: 90.2 SEC — HIGH (ref 24.5–35.6)
APTT BLD: >200 SEC — CRITICAL HIGH (ref 24.5–35.6)
AST SERPL-CCNC: 10 U/L — SIGNIFICANT CHANGE UP (ref 10–40)
AST SERPL-CCNC: 11 U/L — SIGNIFICANT CHANGE UP (ref 10–40)
AST SERPL-CCNC: 11 U/L — SIGNIFICANT CHANGE UP (ref 10–40)
AST SERPL-CCNC: 13 U/L — SIGNIFICANT CHANGE UP (ref 10–40)
AST SERPL-CCNC: 21 U/L — SIGNIFICANT CHANGE UP (ref 10–40)
AST SERPL-CCNC: 6 U/L — LOW (ref 10–40)
AST SERPL-CCNC: 7 U/L — LOW (ref 10–40)
AST SERPL-CCNC: 8 U/L — LOW (ref 10–40)
BACTERIA # UR AUTO: ABNORMAL /HPF
BASE EXCESS BLDA CALC-SCNC: 0.8 MMOL/L — SIGNIFICANT CHANGE UP (ref -2–3)
BASE EXCESS BLDV CALC-SCNC: 2.8 MMOL/L — SIGNIFICANT CHANGE UP (ref -2–3)
BASOPHILS # BLD AUTO: 0 K/UL — SIGNIFICANT CHANGE UP (ref 0–0.2)
BASOPHILS # BLD AUTO: 0.02 K/UL — SIGNIFICANT CHANGE UP (ref 0–0.2)
BASOPHILS # BLD AUTO: 0.03 K/UL — SIGNIFICANT CHANGE UP (ref 0–0.2)
BASOPHILS # BLD AUTO: 0.03 K/UL — SIGNIFICANT CHANGE UP (ref 0–0.2)
BASOPHILS # BLD AUTO: 0.05 K/UL — SIGNIFICANT CHANGE UP (ref 0–0.2)
BASOPHILS # BLD AUTO: 0.06 K/UL — SIGNIFICANT CHANGE UP (ref 0–0.2)
BASOPHILS NFR BLD AUTO: 0 % — SIGNIFICANT CHANGE UP (ref 0–2)
BASOPHILS NFR BLD AUTO: 0.3 % — SIGNIFICANT CHANGE UP (ref 0–2)
BASOPHILS NFR BLD AUTO: 0.3 % — SIGNIFICANT CHANGE UP (ref 0–2)
BASOPHILS NFR BLD AUTO: 0.4 % — SIGNIFICANT CHANGE UP (ref 0–2)
BASOPHILS NFR BLD AUTO: 0.7 % — SIGNIFICANT CHANGE UP (ref 0–2)
BASOPHILS NFR BLD AUTO: 0.9 % — SIGNIFICANT CHANGE UP (ref 0–2)
BILIRUB SERPL-MCNC: 0.1 MG/DL — LOW (ref 0.2–1.2)
BILIRUB SERPL-MCNC: 0.2 MG/DL — SIGNIFICANT CHANGE UP (ref 0.2–1.2)
BILIRUB SERPL-MCNC: 0.3 MG/DL — SIGNIFICANT CHANGE UP (ref 0.2–1.2)
BILIRUB SERPL-MCNC: 0.3 MG/DL — SIGNIFICANT CHANGE UP (ref 0.2–1.2)
BILIRUB SERPL-MCNC: 0.4 MG/DL — SIGNIFICANT CHANGE UP (ref 0.2–1.2)
BILIRUB SERPL-MCNC: 0.4 MG/DL — SIGNIFICANT CHANGE UP (ref 0.2–1.2)
BILIRUB UR-MCNC: NEGATIVE — SIGNIFICANT CHANGE UP
BLANDM BLD POS QL PROBE: SIGNIFICANT CHANGE UP
BLD GP AB SCN SERPL QL: NEGATIVE — SIGNIFICANT CHANGE UP
BLD GP AB SCN SERPL QL: NEGATIVE — SIGNIFICANT CHANGE UP
BUN SERPL-MCNC: 101 MG/DL — HIGH (ref 7–23)
BUN SERPL-MCNC: 14 MG/DL — SIGNIFICANT CHANGE UP (ref 7–23)
BUN SERPL-MCNC: 15 MG/DL — SIGNIFICANT CHANGE UP (ref 7–23)
BUN SERPL-MCNC: 17 MG/DL — SIGNIFICANT CHANGE UP (ref 7–23)
BUN SERPL-MCNC: 19 MG/DL — SIGNIFICANT CHANGE UP (ref 7–23)
BUN SERPL-MCNC: 20 MG/DL — SIGNIFICANT CHANGE UP (ref 7–23)
BUN SERPL-MCNC: 20 MG/DL — SIGNIFICANT CHANGE UP (ref 7–23)
BUN SERPL-MCNC: 22 MG/DL — SIGNIFICANT CHANGE UP (ref 7–23)
BUN SERPL-MCNC: 22 MG/DL — SIGNIFICANT CHANGE UP (ref 7–23)
BUN SERPL-MCNC: 23 MG/DL — SIGNIFICANT CHANGE UP (ref 7–23)
BUN SERPL-MCNC: 23 MG/DL — SIGNIFICANT CHANGE UP (ref 7–23)
BUN SERPL-MCNC: 24 MG/DL — HIGH (ref 7–23)
BUN SERPL-MCNC: 25 MG/DL — HIGH (ref 7–23)
BUN SERPL-MCNC: 25 MG/DL — HIGH (ref 7–23)
BUN SERPL-MCNC: 26 MG/DL — HIGH (ref 7–23)
BUN SERPL-MCNC: 27 MG/DL — HIGH (ref 7–23)
BUN SERPL-MCNC: 28 MG/DL — HIGH (ref 7–23)
BUN SERPL-MCNC: 28 MG/DL — HIGH (ref 7–23)
BUN SERPL-MCNC: 29 MG/DL — HIGH (ref 7–23)
BUN SERPL-MCNC: 30 MG/DL — HIGH (ref 7–23)
BUN SERPL-MCNC: 30 MG/DL — HIGH (ref 7–23)
BUN SERPL-MCNC: 31 MG/DL — HIGH (ref 7–23)
BUN SERPL-MCNC: 32 MG/DL — HIGH (ref 7–23)
BUN SERPL-MCNC: 33 MG/DL — HIGH (ref 7–23)
BUN SERPL-MCNC: 33 MG/DL — HIGH (ref 7–23)
BUN SERPL-MCNC: 34 MG/DL — HIGH (ref 7–23)
BUN SERPL-MCNC: 37 MG/DL — HIGH (ref 7–23)
BUN SERPL-MCNC: 42 MG/DL — HIGH (ref 7–23)
BUN SERPL-MCNC: 43 MG/DL — HIGH (ref 7–23)
BUN SERPL-MCNC: 62 MG/DL — HIGH (ref 7–23)
BUN SERPL-MCNC: 94 MG/DL — HIGH (ref 7–23)
CALCIUM SERPL-MCNC: 7 MG/DL — LOW (ref 8.4–10.5)
CALCIUM SERPL-MCNC: 7 MG/DL — LOW (ref 8.4–10.5)
CALCIUM SERPL-MCNC: 7.1 MG/DL — LOW (ref 8.4–10.5)
CALCIUM SERPL-MCNC: 7.2 MG/DL — LOW (ref 8.4–10.5)
CALCIUM SERPL-MCNC: 7.3 MG/DL — LOW (ref 8.4–10.5)
CALCIUM SERPL-MCNC: 7.4 MG/DL — LOW (ref 8.4–10.5)
CALCIUM SERPL-MCNC: 7.5 MG/DL — LOW (ref 8.4–10.5)
CALCIUM SERPL-MCNC: 7.6 MG/DL — LOW (ref 8.4–10.5)
CALCIUM SERPL-MCNC: 7.8 MG/DL — LOW (ref 8.4–10.5)
CALCIUM SERPL-MCNC: 7.9 MG/DL — LOW (ref 8.4–10.5)
CALCIUM SERPL-MCNC: 8 MG/DL — LOW (ref 8.4–10.5)
CALCIUM SERPL-MCNC: 8.3 MG/DL — LOW (ref 8.4–10.5)
CALCIUM SERPL-MCNC: 8.3 MG/DL — LOW (ref 8.4–10.5)
CALCIUM SERPL-MCNC: 8.4 MG/DL — SIGNIFICANT CHANGE UP (ref 8.4–10.5)
CAST: 10 /LPF — HIGH (ref 0–4)
CHLORIDE SERPL-SCNC: 101 MMOL/L — SIGNIFICANT CHANGE UP (ref 96–108)
CHLORIDE SERPL-SCNC: 88 MMOL/L — LOW (ref 96–108)
CHLORIDE SERPL-SCNC: 88 MMOL/L — LOW (ref 96–108)
CHLORIDE SERPL-SCNC: 89 MMOL/L — LOW (ref 96–108)
CHLORIDE SERPL-SCNC: 92 MMOL/L — LOW (ref 96–108)
CHLORIDE SERPL-SCNC: 93 MMOL/L — LOW (ref 96–108)
CHLORIDE SERPL-SCNC: 94 MMOL/L — LOW (ref 96–108)
CHLORIDE SERPL-SCNC: 95 MMOL/L — LOW (ref 96–108)
CHLORIDE SERPL-SCNC: 95 MMOL/L — LOW (ref 96–108)
CHLORIDE SERPL-SCNC: 96 MMOL/L — SIGNIFICANT CHANGE UP (ref 96–108)
CHLORIDE SERPL-SCNC: 97 MMOL/L — SIGNIFICANT CHANGE UP (ref 96–108)
CHLORIDE SERPL-SCNC: 98 MMOL/L — SIGNIFICANT CHANGE UP (ref 96–108)
CHLORIDE SERPL-SCNC: 99 MMOL/L — SIGNIFICANT CHANGE UP (ref 96–108)
CK SERPL-CCNC: 26 U/L — LOW (ref 30–200)
CO2 BLDA-SCNC: 28 MMOL/L — HIGH (ref 19–24)
CO2 BLDV-SCNC: 32 MMOL/L — HIGH (ref 22–26)
CO2 SERPL-SCNC: 17 MMOL/L — LOW (ref 22–31)
CO2 SERPL-SCNC: 19 MMOL/L — LOW (ref 22–31)
CO2 SERPL-SCNC: 20 MMOL/L — LOW (ref 22–31)
CO2 SERPL-SCNC: 21 MMOL/L — LOW (ref 22–31)
CO2 SERPL-SCNC: 22 MMOL/L — SIGNIFICANT CHANGE UP (ref 22–31)
CO2 SERPL-SCNC: 23 MMOL/L — SIGNIFICANT CHANGE UP (ref 22–31)
CO2 SERPL-SCNC: 24 MMOL/L — SIGNIFICANT CHANGE UP (ref 22–31)
CO2 SERPL-SCNC: 25 MMOL/L — SIGNIFICANT CHANGE UP (ref 22–31)
CO2 SERPL-SCNC: 26 MMOL/L — SIGNIFICANT CHANGE UP (ref 22–31)
CO2 SERPL-SCNC: 28 MMOL/L — SIGNIFICANT CHANGE UP (ref 22–31)
COLOR SPEC: YELLOW — SIGNIFICANT CHANGE UP
CORTIS AM PEAK SERPL-MCNC: 17.1 UG/DL — SIGNIFICANT CHANGE UP (ref 6–18.4)
CORTIS AM PEAK SERPL-MCNC: 8.3 UG/DL — SIGNIFICANT CHANGE UP (ref 6–18.4)
CREAT SERPL-MCNC: 2.31 MG/DL — HIGH (ref 0.5–1.3)
CREAT SERPL-MCNC: 2.52 MG/DL — HIGH (ref 0.5–1.3)
CREAT SERPL-MCNC: 2.74 MG/DL — HIGH (ref 0.5–1.3)
CREAT SERPL-MCNC: 2.78 MG/DL — HIGH (ref 0.5–1.3)
CREAT SERPL-MCNC: 2.83 MG/DL — HIGH (ref 0.5–1.3)
CREAT SERPL-MCNC: 2.84 MG/DL — HIGH (ref 0.5–1.3)
CREAT SERPL-MCNC: 2.87 MG/DL — HIGH (ref 0.5–1.3)
CREAT SERPL-MCNC: 2.9 MG/DL — HIGH (ref 0.5–1.3)
CREAT SERPL-MCNC: 2.92 MG/DL — HIGH (ref 0.5–1.3)
CREAT SERPL-MCNC: 3.13 MG/DL — HIGH (ref 0.5–1.3)
CREAT SERPL-MCNC: 3.21 MG/DL — HIGH (ref 0.5–1.3)
CREAT SERPL-MCNC: 3.21 MG/DL — HIGH (ref 0.5–1.3)
CREAT SERPL-MCNC: 3.28 MG/DL — HIGH (ref 0.5–1.3)
CREAT SERPL-MCNC: 3.36 MG/DL — HIGH (ref 0.5–1.3)
CREAT SERPL-MCNC: 3.36 MG/DL — HIGH (ref 0.5–1.3)
CREAT SERPL-MCNC: 3.39 MG/DL — HIGH (ref 0.5–1.3)
CREAT SERPL-MCNC: 3.43 MG/DL — HIGH (ref 0.5–1.3)
CREAT SERPL-MCNC: 3.43 MG/DL — HIGH (ref 0.5–1.3)
CREAT SERPL-MCNC: 3.63 MG/DL — HIGH (ref 0.5–1.3)
CREAT SERPL-MCNC: 3.72 MG/DL — HIGH (ref 0.5–1.3)
CREAT SERPL-MCNC: 3.94 MG/DL — HIGH (ref 0.5–1.3)
CREAT SERPL-MCNC: 3.99 MG/DL — HIGH (ref 0.5–1.3)
CREAT SERPL-MCNC: 4.04 MG/DL — HIGH (ref 0.5–1.3)
CREAT SERPL-MCNC: 4.11 MG/DL — HIGH (ref 0.5–1.3)
CREAT SERPL-MCNC: 4.12 MG/DL — HIGH (ref 0.5–1.3)
CREAT SERPL-MCNC: 4.16 MG/DL — HIGH (ref 0.5–1.3)
CREAT SERPL-MCNC: 4.28 MG/DL — HIGH (ref 0.5–1.3)
CREAT SERPL-MCNC: 4.49 MG/DL — HIGH (ref 0.5–1.3)
CREAT SERPL-MCNC: 4.49 MG/DL — HIGH (ref 0.5–1.3)
CREAT SERPL-MCNC: 4.99 MG/DL — HIGH (ref 0.5–1.3)
CREAT SERPL-MCNC: 5.26 MG/DL — HIGH (ref 0.5–1.3)
CREAT SERPL-MCNC: 5.33 MG/DL — HIGH (ref 0.5–1.3)
CREAT SERPL-MCNC: 5.69 MG/DL — HIGH (ref 0.5–1.3)
CREAT SERPL-MCNC: 7.71 MG/DL — HIGH (ref 0.5–1.3)
CREAT SERPL-MCNC: 7.76 MG/DL — HIGH (ref 0.5–1.3)
CULTURE RESULTS: ABNORMAL
CULTURE RESULTS: SIGNIFICANT CHANGE UP
CULTURE RESULTS: SIGNIFICANT CHANGE UP
DACRYOCYTES BLD QL SMEAR: SLIGHT — SIGNIFICANT CHANGE UP
DIFF PNL FLD: ABNORMAL
EGFR: 10 ML/MIN/1.73M2 — LOW
EGFR: 10 ML/MIN/1.73M2 — LOW
EGFR: 11 ML/MIN/1.73M2 — LOW
EGFR: 12 ML/MIN/1.73M2 — LOW
EGFR: 12 ML/MIN/1.73M2 — LOW
EGFR: 13 ML/MIN/1.73M2 — LOW
EGFR: 14 ML/MIN/1.73M2 — LOW
EGFR: 15 ML/MIN/1.73M2 — LOW
EGFR: 16 ML/MIN/1.73M2 — LOW
EGFR: 16 ML/MIN/1.73M2 — LOW
EGFR: 17 ML/MIN/1.73M2 — LOW
EGFR: 17 ML/MIN/1.73M2 — LOW
EGFR: 18 ML/MIN/1.73M2 — LOW
EGFR: 19 ML/MIN/1.73M2 — LOW
EGFR: 19 ML/MIN/1.73M2 — LOW
EGFR: 20 ML/MIN/1.73M2 — LOW
EGFR: 22 ML/MIN/1.73M2 — LOW
EGFR: 23 ML/MIN/1.73M2 — LOW
EGFR: 24 ML/MIN/1.73M2 — LOW
EGFR: 24 ML/MIN/1.73M2 — LOW
EGFR: 26 ML/MIN/1.73M2 — LOW
EGFR: 26 ML/MIN/1.73M2 — LOW
EGFR: 29 ML/MIN/1.73M2 — LOW
EGFR: 29 ML/MIN/1.73M2 — LOW
EGFR: 7 ML/MIN/1.73M2 — LOW
EOSINOPHIL # BLD AUTO: 0 K/UL — SIGNIFICANT CHANGE UP (ref 0–0.5)
EOSINOPHIL # BLD AUTO: 0.05 K/UL — SIGNIFICANT CHANGE UP (ref 0–0.5)
EOSINOPHIL # BLD AUTO: 0.37 K/UL — SIGNIFICANT CHANGE UP (ref 0–0.5)
EOSINOPHIL # BLD AUTO: 0.37 K/UL — SIGNIFICANT CHANGE UP (ref 0–0.5)
EOSINOPHIL # BLD AUTO: 0.39 K/UL — SIGNIFICANT CHANGE UP (ref 0–0.5)
EOSINOPHIL # BLD AUTO: 0.42 K/UL — SIGNIFICANT CHANGE UP (ref 0–0.5)
EOSINOPHIL NFR BLD AUTO: 0 % — SIGNIFICANT CHANGE UP (ref 0–6)
EOSINOPHIL NFR BLD AUTO: 0.6 % — SIGNIFICANT CHANGE UP (ref 0–6)
EOSINOPHIL NFR BLD AUTO: 4.3 % — SIGNIFICANT CHANGE UP (ref 0–6)
EOSINOPHIL NFR BLD AUTO: 4.6 % — SIGNIFICANT CHANGE UP (ref 0–6)
EOSINOPHIL NFR BLD AUTO: 5.3 % — SIGNIFICANT CHANGE UP (ref 0–6)
EOSINOPHIL NFR BLD AUTO: 6.1 % — HIGH (ref 0–6)
FERRITIN SERPL-MCNC: 365 NG/ML — SIGNIFICANT CHANGE UP (ref 30–400)
FLUAV AG NPH QL: SIGNIFICANT CHANGE UP
FLUBV AG NPH QL: SIGNIFICANT CHANGE UP
GAS PNL BLDA: SIGNIFICANT CHANGE UP
GAS PNL BLDV: SIGNIFICANT CHANGE UP
GAS PNL BLDV: SIGNIFICANT CHANGE UP
GLUCOSE BLDC GLUCOMTR-MCNC: 100 MG/DL — HIGH (ref 70–99)
GLUCOSE BLDC GLUCOMTR-MCNC: 106 MG/DL — HIGH (ref 70–99)
GLUCOSE BLDC GLUCOMTR-MCNC: 124 MG/DL — HIGH (ref 70–99)
GLUCOSE BLDC GLUCOMTR-MCNC: 126 MG/DL — HIGH (ref 70–99)
GLUCOSE BLDC GLUCOMTR-MCNC: 140 MG/DL — HIGH (ref 70–99)
GLUCOSE BLDC GLUCOMTR-MCNC: 152 MG/DL — HIGH (ref 70–99)
GLUCOSE BLDC GLUCOMTR-MCNC: 189 MG/DL — HIGH (ref 70–99)
GLUCOSE BLDC GLUCOMTR-MCNC: 78 MG/DL — SIGNIFICANT CHANGE UP (ref 70–99)
GLUCOSE BLDC GLUCOMTR-MCNC: 95 MG/DL — SIGNIFICANT CHANGE UP (ref 70–99)
GLUCOSE SERPL-MCNC: 100 MG/DL — HIGH (ref 70–99)
GLUCOSE SERPL-MCNC: 104 MG/DL — HIGH (ref 70–99)
GLUCOSE SERPL-MCNC: 113 MG/DL — HIGH (ref 70–99)
GLUCOSE SERPL-MCNC: 115 MG/DL — HIGH (ref 70–99)
GLUCOSE SERPL-MCNC: 115 MG/DL — HIGH (ref 70–99)
GLUCOSE SERPL-MCNC: 117 MG/DL — HIGH (ref 70–99)
GLUCOSE SERPL-MCNC: 124 MG/DL — HIGH (ref 70–99)
GLUCOSE SERPL-MCNC: 141 MG/DL — HIGH (ref 70–99)
GLUCOSE SERPL-MCNC: 141 MG/DL — HIGH (ref 70–99)
GLUCOSE SERPL-MCNC: 144 MG/DL — HIGH (ref 70–99)
GLUCOSE SERPL-MCNC: 148 MG/DL — HIGH (ref 70–99)
GLUCOSE SERPL-MCNC: 153 MG/DL — HIGH (ref 70–99)
GLUCOSE SERPL-MCNC: 157 MG/DL — HIGH (ref 70–99)
GLUCOSE SERPL-MCNC: 161 MG/DL — HIGH (ref 70–99)
GLUCOSE SERPL-MCNC: 161 MG/DL — HIGH (ref 70–99)
GLUCOSE SERPL-MCNC: 56 MG/DL — LOW (ref 70–99)
GLUCOSE SERPL-MCNC: 63 MG/DL — LOW (ref 70–99)
GLUCOSE SERPL-MCNC: 64 MG/DL — LOW (ref 70–99)
GLUCOSE SERPL-MCNC: 68 MG/DL — LOW (ref 70–99)
GLUCOSE SERPL-MCNC: 73 MG/DL — SIGNIFICANT CHANGE UP (ref 70–99)
GLUCOSE SERPL-MCNC: 73 MG/DL — SIGNIFICANT CHANGE UP (ref 70–99)
GLUCOSE SERPL-MCNC: 74 MG/DL — SIGNIFICANT CHANGE UP (ref 70–99)
GLUCOSE SERPL-MCNC: 83 MG/DL — SIGNIFICANT CHANGE UP (ref 70–99)
GLUCOSE SERPL-MCNC: 83 MG/DL — SIGNIFICANT CHANGE UP (ref 70–99)
GLUCOSE SERPL-MCNC: 84 MG/DL — SIGNIFICANT CHANGE UP (ref 70–99)
GLUCOSE SERPL-MCNC: 86 MG/DL — SIGNIFICANT CHANGE UP (ref 70–99)
GLUCOSE SERPL-MCNC: 88 MG/DL — SIGNIFICANT CHANGE UP (ref 70–99)
GLUCOSE SERPL-MCNC: 90 MG/DL — SIGNIFICANT CHANGE UP (ref 70–99)
GLUCOSE SERPL-MCNC: 91 MG/DL — SIGNIFICANT CHANGE UP (ref 70–99)
GLUCOSE SERPL-MCNC: 93 MG/DL — SIGNIFICANT CHANGE UP (ref 70–99)
GLUCOSE SERPL-MCNC: 95 MG/DL — SIGNIFICANT CHANGE UP (ref 70–99)
GLUCOSE SERPL-MCNC: 97 MG/DL — SIGNIFICANT CHANGE UP (ref 70–99)
GLUCOSE SERPL-MCNC: 99 MG/DL — SIGNIFICANT CHANGE UP (ref 70–99)
GLUCOSE UR QL: NEGATIVE MG/DL — SIGNIFICANT CHANGE UP
HBV CORE AB SER-ACNC: SIGNIFICANT CHANGE UP
HBV CORE AB SER-ACNC: SIGNIFICANT CHANGE UP
HBV CORE IGM SER-ACNC: SIGNIFICANT CHANGE UP
HBV SURFACE AB SER-ACNC: 535.6 MIU/ML — SIGNIFICANT CHANGE UP
HBV SURFACE AB SER-ACNC: <3 MIU/ML — LOW
HBV SURFACE AG SER-ACNC: SIGNIFICANT CHANGE UP
HBV SURFACE AG SER-ACNC: SIGNIFICANT CHANGE UP
HCO3 BLDA-SCNC: 27 MMOL/L — SIGNIFICANT CHANGE UP (ref 21–28)
HCO3 BLDV-SCNC: 30 MMOL/L — HIGH (ref 22–29)
HCT VFR BLD CALC: 24.5 % — LOW (ref 39–50)
HCT VFR BLD CALC: 24.7 % — LOW (ref 39–50)
HCT VFR BLD CALC: 26.3 % — LOW (ref 39–50)
HCT VFR BLD CALC: 26.3 % — LOW (ref 39–50)
HCT VFR BLD CALC: 26.7 % — LOW (ref 39–50)
HCT VFR BLD CALC: 27.7 % — LOW (ref 39–50)
HCT VFR BLD CALC: 27.9 % — LOW (ref 39–50)
HCT VFR BLD CALC: 28.1 % — LOW (ref 39–50)
HCT VFR BLD CALC: 28.3 % — LOW (ref 39–50)
HCT VFR BLD CALC: 28.5 % — LOW (ref 39–50)
HCT VFR BLD CALC: 28.9 % — LOW (ref 39–50)
HCT VFR BLD CALC: 28.9 % — LOW (ref 39–50)
HCT VFR BLD CALC: 29 % — LOW (ref 39–50)
HCT VFR BLD CALC: 29 % — LOW (ref 39–50)
HCT VFR BLD CALC: 29.1 % — LOW (ref 39–50)
HCT VFR BLD CALC: 29.3 % — LOW (ref 39–50)
HCT VFR BLD CALC: 29.5 % — LOW (ref 39–50)
HCT VFR BLD CALC: 29.5 % — LOW (ref 39–50)
HCT VFR BLD CALC: 29.7 % — LOW (ref 39–50)
HCT VFR BLD CALC: 29.7 % — LOW (ref 39–50)
HCT VFR BLD CALC: 30 % — LOW (ref 39–50)
HCT VFR BLD CALC: 30.1 % — LOW (ref 39–50)
HCT VFR BLD CALC: 30.2 % — LOW (ref 39–50)
HCT VFR BLD CALC: 30.4 % — LOW (ref 39–50)
HCT VFR BLD CALC: 31.2 % — LOW (ref 39–50)
HCT VFR BLD CALC: 31.3 % — LOW (ref 39–50)
HCT VFR BLD CALC: 31.4 % — LOW (ref 39–50)
HCT VFR BLD CALC: 31.5 % — LOW (ref 39–50)
HCT VFR BLD CALC: 31.9 % — LOW (ref 39–50)
HCT VFR BLD CALC: 32.8 % — LOW (ref 39–50)
HCT VFR BLD CALC: 32.9 % — LOW (ref 39–50)
HCT VFR BLD CALC: 33.2 % — LOW (ref 39–50)
HCT VFR BLD CALC: 33.3 % — LOW (ref 39–50)
HCT VFR BLD CALC: 33.4 % — LOW (ref 39–50)
HCT VFR BLD CALC: 33.5 % — LOW (ref 39–50)
HCT VFR BLD CALC: 33.8 % — LOW (ref 39–50)
HCT VFR BLD CALC: 33.9 % — LOW (ref 39–50)
HCT VFR BLD CALC: 33.9 % — LOW (ref 39–50)
HCT VFR BLD CALC: 34.7 % — LOW (ref 39–50)
HCT VFR BLD CALC: 35.7 % — LOW (ref 39–50)
HCT VFR BLD CALC: 38.4 % — LOW (ref 39–50)
HCV AB S/CO SERPL IA: 0.06 S/CO — SIGNIFICANT CHANGE UP
HCV AB SERPL-IMP: SIGNIFICANT CHANGE UP
HGB BLD-MCNC: 10 G/DL — LOW (ref 13–17)
HGB BLD-MCNC: 10.5 G/DL — LOW (ref 13–17)
HGB BLD-MCNC: 10.7 G/DL — LOW (ref 13–17)
HGB BLD-MCNC: 10.9 G/DL — LOW (ref 13–17)
HGB BLD-MCNC: 11.5 G/DL — LOW (ref 13–17)
HGB BLD-MCNC: 7.5 G/DL — LOW (ref 13–17)
HGB BLD-MCNC: 7.6 G/DL — LOW (ref 13–17)
HGB BLD-MCNC: 7.9 G/DL — LOW (ref 13–17)
HGB BLD-MCNC: 7.9 G/DL — LOW (ref 13–17)
HGB BLD-MCNC: 8.2 G/DL — LOW (ref 13–17)
HGB BLD-MCNC: 8.3 G/DL — LOW (ref 13–17)
HGB BLD-MCNC: 8.4 G/DL — LOW (ref 13–17)
HGB BLD-MCNC: 8.5 G/DL — LOW (ref 13–17)
HGB BLD-MCNC: 8.6 G/DL — LOW (ref 13–17)
HGB BLD-MCNC: 8.6 G/DL — LOW (ref 13–17)
HGB BLD-MCNC: 8.7 G/DL — LOW (ref 13–17)
HGB BLD-MCNC: 8.8 G/DL — LOW (ref 13–17)
HGB BLD-MCNC: 8.8 G/DL — LOW (ref 13–17)
HGB BLD-MCNC: 8.9 G/DL — LOW (ref 13–17)
HGB BLD-MCNC: 8.9 G/DL — LOW (ref 13–17)
HGB BLD-MCNC: 9 G/DL — LOW (ref 13–17)
HGB BLD-MCNC: 9 G/DL — LOW (ref 13–17)
HGB BLD-MCNC: 9.1 G/DL — LOW (ref 13–17)
HGB BLD-MCNC: 9.2 G/DL — LOW (ref 13–17)
HGB BLD-MCNC: 9.3 G/DL — LOW (ref 13–17)
HGB BLD-MCNC: 9.4 G/DL — LOW (ref 13–17)
HGB BLD-MCNC: 9.5 G/DL — LOW (ref 13–17)
HGB BLD-MCNC: 9.5 G/DL — LOW (ref 13–17)
HGB BLD-MCNC: 9.7 G/DL — LOW (ref 13–17)
HGB BLD-MCNC: 9.8 G/DL — LOW (ref 13–17)
HGB BLD-MCNC: 9.9 G/DL — LOW (ref 13–17)
HGB BLD-MCNC: 9.9 G/DL — LOW (ref 13–17)
HOROWITZ INDEX BLDA+IHG-RTO: 21 — SIGNIFICANT CHANGE UP
IMM GRANULOCYTES NFR BLD AUTO: 0.5 % — SIGNIFICANT CHANGE UP (ref 0–0.9)
IMM GRANULOCYTES NFR BLD AUTO: 0.6 % — SIGNIFICANT CHANGE UP (ref 0–0.9)
IMM GRANULOCYTES NFR BLD AUTO: 0.9 % — SIGNIFICANT CHANGE UP (ref 0–0.9)
IMM GRANULOCYTES NFR BLD AUTO: 1 % — HIGH (ref 0–0.9)
IMM GRANULOCYTES NFR BLD AUTO: 1 % — HIGH (ref 0–0.9)
INR BLD: 1.2 RATIO — HIGH (ref 0.85–1.16)
INR BLD: 1.28 RATIO — HIGH (ref 0.85–1.16)
INR BLD: 1.31 RATIO — HIGH (ref 0.85–1.16)
INR BLD: 1.32 RATIO — HIGH (ref 0.85–1.16)
INR BLD: 1.84 RATIO — HIGH (ref 0.85–1.16)
IRON SATN MFR SERPL: 23 % — SIGNIFICANT CHANGE UP (ref 16–55)
IRON SATN MFR SERPL: 34 UG/DL — LOW (ref 45–165)
KETONES UR-MCNC: NEGATIVE MG/DL — SIGNIFICANT CHANGE UP
LEUKOCYTE ESTERASE UR-ACNC: ABNORMAL
LYMPHOCYTES # BLD AUTO: 0.37 K/UL — LOW (ref 1–3.3)
LYMPHOCYTES # BLD AUTO: 0.46 K/UL — LOW (ref 1–3.3)
LYMPHOCYTES # BLD AUTO: 0.79 K/UL — LOW (ref 1–3.3)
LYMPHOCYTES # BLD AUTO: 1.15 K/UL — SIGNIFICANT CHANGE UP (ref 1–3.3)
LYMPHOCYTES # BLD AUTO: 1.4 K/UL — SIGNIFICANT CHANGE UP (ref 1–3.3)
LYMPHOCYTES # BLD AUTO: 1.59 K/UL — SIGNIFICANT CHANGE UP (ref 1–3.3)
LYMPHOCYTES # BLD AUTO: 11.5 % — LOW (ref 13–44)
LYMPHOCYTES # BLD AUTO: 14.4 % — SIGNIFICANT CHANGE UP (ref 13–44)
LYMPHOCYTES # BLD AUTO: 17.7 % — SIGNIFICANT CHANGE UP (ref 13–44)
LYMPHOCYTES # BLD AUTO: 2.6 % — LOW (ref 13–44)
LYMPHOCYTES # BLD AUTO: 20.2 % — SIGNIFICANT CHANGE UP (ref 13–44)
LYMPHOCYTES # BLD AUTO: 5.9 % — LOW (ref 13–44)
MACROCYTES BLD QL: SLIGHT — SIGNIFICANT CHANGE UP
MAGNESIUM SERPL-MCNC: 1.8 MG/DL — SIGNIFICANT CHANGE UP (ref 1.6–2.6)
MAGNESIUM SERPL-MCNC: 1.9 MG/DL — SIGNIFICANT CHANGE UP (ref 1.6–2.6)
MAGNESIUM SERPL-MCNC: 2 MG/DL — SIGNIFICANT CHANGE UP (ref 1.6–2.6)
MAGNESIUM SERPL-MCNC: 2.1 MG/DL — SIGNIFICANT CHANGE UP (ref 1.6–2.6)
MAGNESIUM SERPL-MCNC: 2.4 MG/DL — SIGNIFICANT CHANGE UP (ref 1.6–2.6)
MAGNESIUM SERPL-MCNC: 2.6 MG/DL — SIGNIFICANT CHANGE UP (ref 1.6–2.6)
MANUAL SMEAR VERIFICATION: SIGNIFICANT CHANGE UP
MCHC RBC-ENTMCNC: 25.9 PG — LOW (ref 27–34)
MCHC RBC-ENTMCNC: 26 PG — LOW (ref 27–34)
MCHC RBC-ENTMCNC: 26.1 PG — LOW (ref 27–34)
MCHC RBC-ENTMCNC: 26.1 PG — LOW (ref 27–34)
MCHC RBC-ENTMCNC: 26.2 PG — LOW (ref 27–34)
MCHC RBC-ENTMCNC: 26.3 PG — LOW (ref 27–34)
MCHC RBC-ENTMCNC: 26.4 PG — LOW (ref 27–34)
MCHC RBC-ENTMCNC: 26.6 PG — LOW (ref 27–34)
MCHC RBC-ENTMCNC: 26.7 PG — LOW (ref 27–34)
MCHC RBC-ENTMCNC: 26.8 PG — LOW (ref 27–34)
MCHC RBC-ENTMCNC: 26.8 PG — LOW (ref 27–34)
MCHC RBC-ENTMCNC: 26.9 PG — LOW (ref 27–34)
MCHC RBC-ENTMCNC: 27 PG — SIGNIFICANT CHANGE UP (ref 27–34)
MCHC RBC-ENTMCNC: 27 PG — SIGNIFICANT CHANGE UP (ref 27–34)
MCHC RBC-ENTMCNC: 27.1 PG — SIGNIFICANT CHANGE UP (ref 27–34)
MCHC RBC-ENTMCNC: 27.2 PG — SIGNIFICANT CHANGE UP (ref 27–34)
MCHC RBC-ENTMCNC: 27.3 PG — SIGNIFICANT CHANGE UP (ref 27–34)
MCHC RBC-ENTMCNC: 27.3 PG — SIGNIFICANT CHANGE UP (ref 27–34)
MCHC RBC-ENTMCNC: 27.4 PG — SIGNIFICANT CHANGE UP (ref 27–34)
MCHC RBC-ENTMCNC: 27.5 PG — SIGNIFICANT CHANGE UP (ref 27–34)
MCHC RBC-ENTMCNC: 28.2 PG — SIGNIFICANT CHANGE UP (ref 27–34)
MCHC RBC-ENTMCNC: 28.8 G/DL — LOW (ref 32–36)
MCHC RBC-ENTMCNC: 28.8 G/DL — LOW (ref 32–36)
MCHC RBC-ENTMCNC: 29.2 G/DL — LOW (ref 32–36)
MCHC RBC-ENTMCNC: 29.3 G/DL — LOW (ref 32–36)
MCHC RBC-ENTMCNC: 29.4 G/DL — LOW (ref 32–36)
MCHC RBC-ENTMCNC: 29.4 G/DL — LOW (ref 32–36)
MCHC RBC-ENTMCNC: 29.5 G/DL — LOW (ref 32–36)
MCHC RBC-ENTMCNC: 29.5 G/DL — LOW (ref 32–36)
MCHC RBC-ENTMCNC: 29.6 G/DL — LOW (ref 32–36)
MCHC RBC-ENTMCNC: 29.6 G/DL — LOW (ref 32–36)
MCHC RBC-ENTMCNC: 29.7 G/DL — LOW (ref 32–36)
MCHC RBC-ENTMCNC: 29.8 G/DL — LOW (ref 32–36)
MCHC RBC-ENTMCNC: 29.9 G/DL — LOW (ref 32–36)
MCHC RBC-ENTMCNC: 30 G/DL — LOW (ref 32–36)
MCHC RBC-ENTMCNC: 30.1 G/DL — LOW (ref 32–36)
MCHC RBC-ENTMCNC: 30.2 G/DL — LOW (ref 32–36)
MCHC RBC-ENTMCNC: 30.2 G/DL — LOW (ref 32–36)
MCHC RBC-ENTMCNC: 30.3 G/DL — LOW (ref 32–36)
MCHC RBC-ENTMCNC: 30.3 G/DL — LOW (ref 32–36)
MCHC RBC-ENTMCNC: 30.4 G/DL — LOW (ref 32–36)
MCHC RBC-ENTMCNC: 30.4 G/DL — LOW (ref 32–36)
MCHC RBC-ENTMCNC: 30.5 G/DL — LOW (ref 32–36)
MCHC RBC-ENTMCNC: 30.5 G/DL — LOW (ref 32–36)
MCHC RBC-ENTMCNC: 30.6 G/DL — LOW (ref 32–36)
MCHC RBC-ENTMCNC: 30.7 G/DL — LOW (ref 32–36)
MCHC RBC-ENTMCNC: 30.7 G/DL — LOW (ref 32–36)
MCHC RBC-ENTMCNC: 30.8 G/DL — LOW (ref 32–36)
MCHC RBC-ENTMCNC: 31 G/DL — LOW (ref 32–36)
MCV RBC AUTO: 86 FL — SIGNIFICANT CHANGE UP (ref 80–100)
MCV RBC AUTO: 86.6 FL — SIGNIFICANT CHANGE UP (ref 80–100)
MCV RBC AUTO: 87.3 FL — SIGNIFICANT CHANGE UP (ref 80–100)
MCV RBC AUTO: 87.4 FL — SIGNIFICANT CHANGE UP (ref 80–100)
MCV RBC AUTO: 87.5 FL — SIGNIFICANT CHANGE UP (ref 80–100)
MCV RBC AUTO: 87.7 FL — SIGNIFICANT CHANGE UP (ref 80–100)
MCV RBC AUTO: 87.7 FL — SIGNIFICANT CHANGE UP (ref 80–100)
MCV RBC AUTO: 88 FL — SIGNIFICANT CHANGE UP (ref 80–100)
MCV RBC AUTO: 88.2 FL — SIGNIFICANT CHANGE UP (ref 80–100)
MCV RBC AUTO: 88.3 FL — SIGNIFICANT CHANGE UP (ref 80–100)
MCV RBC AUTO: 88.3 FL — SIGNIFICANT CHANGE UP (ref 80–100)
MCV RBC AUTO: 88.8 FL — SIGNIFICANT CHANGE UP (ref 80–100)
MCV RBC AUTO: 88.9 FL — SIGNIFICANT CHANGE UP (ref 80–100)
MCV RBC AUTO: 89 FL — SIGNIFICANT CHANGE UP (ref 80–100)
MCV RBC AUTO: 89.2 FL — SIGNIFICANT CHANGE UP (ref 80–100)
MCV RBC AUTO: 89.2 FL — SIGNIFICANT CHANGE UP (ref 80–100)
MCV RBC AUTO: 89.4 FL — SIGNIFICANT CHANGE UP (ref 80–100)
MCV RBC AUTO: 89.5 FL — SIGNIFICANT CHANGE UP (ref 80–100)
MCV RBC AUTO: 89.6 FL — SIGNIFICANT CHANGE UP (ref 80–100)
MCV RBC AUTO: 89.6 FL — SIGNIFICANT CHANGE UP (ref 80–100)
MCV RBC AUTO: 89.7 FL — SIGNIFICANT CHANGE UP (ref 80–100)
MCV RBC AUTO: 89.7 FL — SIGNIFICANT CHANGE UP (ref 80–100)
MCV RBC AUTO: 89.9 FL — SIGNIFICANT CHANGE UP (ref 80–100)
MCV RBC AUTO: 90 FL — SIGNIFICANT CHANGE UP (ref 80–100)
MCV RBC AUTO: 90.1 FL — SIGNIFICANT CHANGE UP (ref 80–100)
MCV RBC AUTO: 90.4 FL — SIGNIFICANT CHANGE UP (ref 80–100)
MCV RBC AUTO: 90.7 FL — SIGNIFICANT CHANGE UP (ref 80–100)
MCV RBC AUTO: 91 FL — SIGNIFICANT CHANGE UP (ref 80–100)
MCV RBC AUTO: 91.1 FL — SIGNIFICANT CHANGE UP (ref 80–100)
MCV RBC AUTO: 91.2 FL — SIGNIFICANT CHANGE UP (ref 80–100)
MCV RBC AUTO: 91.3 FL — SIGNIFICANT CHANGE UP (ref 80–100)
MCV RBC AUTO: 91.4 FL — SIGNIFICANT CHANGE UP (ref 80–100)
MCV RBC AUTO: 91.5 FL — SIGNIFICANT CHANGE UP (ref 80–100)
MCV RBC AUTO: 91.6 FL — SIGNIFICANT CHANGE UP (ref 80–100)
MCV RBC AUTO: 91.8 FL — SIGNIFICANT CHANGE UP (ref 80–100)
MCV RBC AUTO: 91.9 FL — SIGNIFICANT CHANGE UP (ref 80–100)
MCV RBC AUTO: 92.3 FL — SIGNIFICANT CHANGE UP (ref 80–100)
MCV RBC AUTO: 93.1 FL — SIGNIFICANT CHANGE UP (ref 80–100)
MCV RBC AUTO: 93.2 FL — SIGNIFICANT CHANGE UP (ref 80–100)
METHOD TYPE: SIGNIFICANT CHANGE UP
MONOCYTES # BLD AUTO: 0.12 K/UL — SIGNIFICANT CHANGE UP (ref 0–0.9)
MONOCYTES # BLD AUTO: 0.56 K/UL — SIGNIFICANT CHANGE UP (ref 0–0.9)
MONOCYTES # BLD AUTO: 0.74 K/UL — SIGNIFICANT CHANGE UP (ref 0–0.9)
MONOCYTES # BLD AUTO: 0.98 K/UL — HIGH (ref 0–0.9)
MONOCYTES # BLD AUTO: 1.06 K/UL — HIGH (ref 0–0.9)
MONOCYTES # BLD AUTO: 1.14 K/UL — HIGH (ref 0–0.9)
MONOCYTES NFR BLD AUTO: 0.8 % — LOW (ref 2–14)
MONOCYTES NFR BLD AUTO: 10.8 % — SIGNIFICANT CHANGE UP (ref 2–14)
MONOCYTES NFR BLD AUTO: 12.3 % — SIGNIFICANT CHANGE UP (ref 2–14)
MONOCYTES NFR BLD AUTO: 12.7 % — SIGNIFICANT CHANGE UP (ref 2–14)
MONOCYTES NFR BLD AUTO: 15.3 % — HIGH (ref 2–14)
MONOCYTES NFR BLD AUTO: 7.2 % — SIGNIFICANT CHANGE UP (ref 2–14)
MRSA PCR RESULT.: SIGNIFICANT CHANGE UP
MRSA PCR RESULT.: SIGNIFICANT CHANGE UP
NEUTROPHILS # BLD AUTO: 13.92 K/UL — HIGH (ref 1.8–7.4)
NEUTROPHILS # BLD AUTO: 3.97 K/UL — SIGNIFICANT CHANGE UP (ref 1.8–7.4)
NEUTROPHILS # BLD AUTO: 4.8 K/UL — SIGNIFICANT CHANGE UP (ref 1.8–7.4)
NEUTROPHILS # BLD AUTO: 5.42 K/UL — SIGNIFICANT CHANGE UP (ref 1.8–7.4)
NEUTROPHILS # BLD AUTO: 5.74 K/UL — SIGNIFICANT CHANGE UP (ref 1.8–7.4)
NEUTROPHILS # BLD AUTO: 6.69 K/UL — SIGNIFICANT CHANGE UP (ref 1.8–7.4)
NEUTROPHILS NFR BLD AUTO: 57.5 % — SIGNIFICANT CHANGE UP (ref 43–77)
NEUTROPHILS NFR BLD AUTO: 64 % — SIGNIFICANT CHANGE UP (ref 43–77)
NEUTROPHILS NFR BLD AUTO: 67.8 % — SIGNIFICANT CHANGE UP (ref 43–77)
NEUTROPHILS NFR BLD AUTO: 69.8 % — SIGNIFICANT CHANGE UP (ref 43–77)
NEUTROPHILS NFR BLD AUTO: 85.4 % — HIGH (ref 43–77)
NEUTROPHILS NFR BLD AUTO: 95.7 % — HIGH (ref 43–77)
NEUTS BAND # BLD: 0.9 % — SIGNIFICANT CHANGE UP (ref 0–8)
NEUTS BAND NFR BLD: 0.9 % — SIGNIFICANT CHANGE UP (ref 0–8)
NIGHT BLUE STAIN TISS: SIGNIFICANT CHANGE UP
NITRITE UR-MCNC: NEGATIVE — SIGNIFICANT CHANGE UP
NRBC BLD AUTO-RTO: 0 /100 WBCS — SIGNIFICANT CHANGE UP (ref 0–0)
ORGANISM # SPEC MICROSCOPIC CNT: ABNORMAL
OSMOLALITY SERPL: 278 MOSMOL/KG — LOW (ref 280–301)
OSMOLALITY UR: 288 MOS/KG — LOW (ref 300–900)
OVALOCYTES BLD QL SMEAR: SLIGHT — SIGNIFICANT CHANGE UP
PCO2 BLDA: 46 MMHG — SIGNIFICANT CHANGE UP (ref 35–48)
PCO2 BLDV: 59 MMHG — HIGH (ref 42–55)
PH BLDA: 7.37 — SIGNIFICANT CHANGE UP (ref 7.35–7.45)
PH BLDV: 7.32 — SIGNIFICANT CHANGE UP (ref 7.32–7.43)
PH UR: 6.5 — SIGNIFICANT CHANGE UP (ref 5–8)
PHOSPHATE SERPL-MCNC: 1.7 MG/DL — LOW (ref 2.5–4.5)
PHOSPHATE SERPL-MCNC: 2.1 MG/DL — LOW (ref 2.5–4.5)
PHOSPHATE SERPL-MCNC: 2.8 MG/DL — SIGNIFICANT CHANGE UP (ref 2.5–4.5)
PHOSPHATE SERPL-MCNC: 3 MG/DL — SIGNIFICANT CHANGE UP (ref 2.5–4.5)
PHOSPHATE SERPL-MCNC: 3.1 MG/DL — SIGNIFICANT CHANGE UP (ref 2.5–4.5)
PHOSPHATE SERPL-MCNC: 3.9 MG/DL — SIGNIFICANT CHANGE UP (ref 2.5–4.5)
PHOSPHATE SERPL-MCNC: 4.6 MG/DL — HIGH (ref 2.5–4.5)
PHOSPHATE SERPL-MCNC: 4.7 MG/DL — HIGH (ref 2.5–4.5)
PHOSPHATE SERPL-MCNC: 5.2 MG/DL — HIGH (ref 2.5–4.5)
PHOSPHATE SERPL-MCNC: 7.9 MG/DL — HIGH (ref 2.5–4.5)
PLAT MORPH BLD: NORMAL — SIGNIFICANT CHANGE UP
PLATELET # BLD AUTO: 164 K/UL — SIGNIFICANT CHANGE UP (ref 150–400)
PLATELET # BLD AUTO: 168 K/UL — SIGNIFICANT CHANGE UP (ref 150–400)
PLATELET # BLD AUTO: 177 K/UL — SIGNIFICANT CHANGE UP (ref 150–400)
PLATELET # BLD AUTO: 178 K/UL — SIGNIFICANT CHANGE UP (ref 150–400)
PLATELET # BLD AUTO: 180 K/UL — SIGNIFICANT CHANGE UP (ref 150–400)
PLATELET # BLD AUTO: 183 K/UL — SIGNIFICANT CHANGE UP (ref 150–400)
PLATELET # BLD AUTO: 183 K/UL — SIGNIFICANT CHANGE UP (ref 150–400)
PLATELET # BLD AUTO: 186 K/UL — SIGNIFICANT CHANGE UP (ref 150–400)
PLATELET # BLD AUTO: 187 K/UL — SIGNIFICANT CHANGE UP (ref 150–400)
PLATELET # BLD AUTO: 189 K/UL — SIGNIFICANT CHANGE UP (ref 150–400)
PLATELET # BLD AUTO: 193 K/UL — SIGNIFICANT CHANGE UP (ref 150–400)
PLATELET # BLD AUTO: 196 K/UL — SIGNIFICANT CHANGE UP (ref 150–400)
PLATELET # BLD AUTO: 201 K/UL — SIGNIFICANT CHANGE UP (ref 150–400)
PLATELET # BLD AUTO: 203 K/UL — SIGNIFICANT CHANGE UP (ref 150–400)
PLATELET # BLD AUTO: 204 K/UL — SIGNIFICANT CHANGE UP (ref 150–400)
PLATELET # BLD AUTO: 204 K/UL — SIGNIFICANT CHANGE UP (ref 150–400)
PLATELET # BLD AUTO: 211 K/UL — SIGNIFICANT CHANGE UP (ref 150–400)
PLATELET # BLD AUTO: 214 K/UL — SIGNIFICANT CHANGE UP (ref 150–400)
PLATELET # BLD AUTO: 216 K/UL — SIGNIFICANT CHANGE UP (ref 150–400)
PLATELET # BLD AUTO: 216 K/UL — SIGNIFICANT CHANGE UP (ref 150–400)
PLATELET # BLD AUTO: 220 K/UL — SIGNIFICANT CHANGE UP (ref 150–400)
PLATELET # BLD AUTO: 229 K/UL — SIGNIFICANT CHANGE UP (ref 150–400)
PLATELET # BLD AUTO: 235 K/UL — SIGNIFICANT CHANGE UP (ref 150–400)
PLATELET # BLD AUTO: 239 K/UL — SIGNIFICANT CHANGE UP (ref 150–400)
PLATELET # BLD AUTO: 240 K/UL — SIGNIFICANT CHANGE UP (ref 150–400)
PLATELET # BLD AUTO: 245 K/UL — SIGNIFICANT CHANGE UP (ref 150–400)
PLATELET # BLD AUTO: 247 K/UL — SIGNIFICANT CHANGE UP (ref 150–400)
PLATELET # BLD AUTO: 248 K/UL — SIGNIFICANT CHANGE UP (ref 150–400)
PLATELET # BLD AUTO: 251 K/UL — SIGNIFICANT CHANGE UP (ref 150–400)
PLATELET # BLD AUTO: 254 K/UL — SIGNIFICANT CHANGE UP (ref 150–400)
PLATELET # BLD AUTO: 258 K/UL — SIGNIFICANT CHANGE UP (ref 150–400)
PLATELET # BLD AUTO: 263 K/UL — SIGNIFICANT CHANGE UP (ref 150–400)
PLATELET # BLD AUTO: 268 K/UL — SIGNIFICANT CHANGE UP (ref 150–400)
PLATELET # BLD AUTO: 269 K/UL — SIGNIFICANT CHANGE UP (ref 150–400)
PLATELET # BLD AUTO: 277 K/UL — SIGNIFICANT CHANGE UP (ref 150–400)
PLATELET # BLD AUTO: 277 K/UL — SIGNIFICANT CHANGE UP (ref 150–400)
PLATELET # BLD AUTO: 278 K/UL — SIGNIFICANT CHANGE UP (ref 150–400)
PLATELET # BLD AUTO: 278 K/UL — SIGNIFICANT CHANGE UP (ref 150–400)
PLATELET # BLD AUTO: 284 K/UL — SIGNIFICANT CHANGE UP (ref 150–400)
PLATELET # BLD AUTO: 285 K/UL — SIGNIFICANT CHANGE UP (ref 150–400)
PLATELET # BLD AUTO: 293 K/UL — SIGNIFICANT CHANGE UP (ref 150–400)
PLATELET # BLD AUTO: 325 K/UL — SIGNIFICANT CHANGE UP (ref 150–400)
PLATELET # BLD AUTO: 340 K/UL — SIGNIFICANT CHANGE UP (ref 150–400)
PO2 BLDA: 76 MMHG — LOW (ref 83–108)
PO2 BLDV: 19 MMHG — LOW (ref 25–45)
POIKILOCYTOSIS BLD QL AUTO: SLIGHT — SIGNIFICANT CHANGE UP
POLYCHROMASIA BLD QL SMEAR: SLIGHT — SIGNIFICANT CHANGE UP
POTASSIUM SERPL-MCNC: 3.6 MMOL/L — SIGNIFICANT CHANGE UP (ref 3.5–5.3)
POTASSIUM SERPL-MCNC: 3.7 MMOL/L — SIGNIFICANT CHANGE UP (ref 3.5–5.3)
POTASSIUM SERPL-MCNC: 3.8 MMOL/L — SIGNIFICANT CHANGE UP (ref 3.5–5.3)
POTASSIUM SERPL-MCNC: 3.8 MMOL/L — SIGNIFICANT CHANGE UP (ref 3.5–5.3)
POTASSIUM SERPL-MCNC: 3.9 MMOL/L — SIGNIFICANT CHANGE UP (ref 3.5–5.3)
POTASSIUM SERPL-MCNC: 4 MMOL/L — SIGNIFICANT CHANGE UP (ref 3.5–5.3)
POTASSIUM SERPL-MCNC: 4 MMOL/L — SIGNIFICANT CHANGE UP (ref 3.5–5.3)
POTASSIUM SERPL-MCNC: 4.1 MMOL/L — SIGNIFICANT CHANGE UP (ref 3.5–5.3)
POTASSIUM SERPL-MCNC: 4.1 MMOL/L — SIGNIFICANT CHANGE UP (ref 3.5–5.3)
POTASSIUM SERPL-MCNC: 4.2 MMOL/L — SIGNIFICANT CHANGE UP (ref 3.5–5.3)
POTASSIUM SERPL-MCNC: 4.3 MMOL/L — SIGNIFICANT CHANGE UP (ref 3.5–5.3)
POTASSIUM SERPL-MCNC: 4.4 MMOL/L — SIGNIFICANT CHANGE UP (ref 3.5–5.3)
POTASSIUM SERPL-MCNC: 4.5 MMOL/L — SIGNIFICANT CHANGE UP (ref 3.5–5.3)
POTASSIUM SERPL-MCNC: 4.6 MMOL/L — SIGNIFICANT CHANGE UP (ref 3.5–5.3)
POTASSIUM SERPL-MCNC: 4.7 MMOL/L — SIGNIFICANT CHANGE UP (ref 3.5–5.3)
POTASSIUM SERPL-MCNC: 4.9 MMOL/L — SIGNIFICANT CHANGE UP (ref 3.5–5.3)
POTASSIUM SERPL-MCNC: 4.9 MMOL/L — SIGNIFICANT CHANGE UP (ref 3.5–5.3)
POTASSIUM SERPL-MCNC: 5 MMOL/L — SIGNIFICANT CHANGE UP (ref 3.5–5.3)
POTASSIUM SERPL-MCNC: 5.1 MMOL/L — SIGNIFICANT CHANGE UP (ref 3.5–5.3)
POTASSIUM SERPL-MCNC: 5.2 MMOL/L — SIGNIFICANT CHANGE UP (ref 3.5–5.3)
POTASSIUM SERPL-MCNC: 5.3 MMOL/L — SIGNIFICANT CHANGE UP (ref 3.5–5.3)
POTASSIUM SERPL-SCNC: 3.6 MMOL/L — SIGNIFICANT CHANGE UP (ref 3.5–5.3)
POTASSIUM SERPL-SCNC: 3.7 MMOL/L — SIGNIFICANT CHANGE UP (ref 3.5–5.3)
POTASSIUM SERPL-SCNC: 3.8 MMOL/L — SIGNIFICANT CHANGE UP (ref 3.5–5.3)
POTASSIUM SERPL-SCNC: 3.8 MMOL/L — SIGNIFICANT CHANGE UP (ref 3.5–5.3)
POTASSIUM SERPL-SCNC: 3.9 MMOL/L — SIGNIFICANT CHANGE UP (ref 3.5–5.3)
POTASSIUM SERPL-SCNC: 4 MMOL/L — SIGNIFICANT CHANGE UP (ref 3.5–5.3)
POTASSIUM SERPL-SCNC: 4 MMOL/L — SIGNIFICANT CHANGE UP (ref 3.5–5.3)
POTASSIUM SERPL-SCNC: 4.1 MMOL/L — SIGNIFICANT CHANGE UP (ref 3.5–5.3)
POTASSIUM SERPL-SCNC: 4.1 MMOL/L — SIGNIFICANT CHANGE UP (ref 3.5–5.3)
POTASSIUM SERPL-SCNC: 4.2 MMOL/L — SIGNIFICANT CHANGE UP (ref 3.5–5.3)
POTASSIUM SERPL-SCNC: 4.3 MMOL/L — SIGNIFICANT CHANGE UP (ref 3.5–5.3)
POTASSIUM SERPL-SCNC: 4.4 MMOL/L — SIGNIFICANT CHANGE UP (ref 3.5–5.3)
POTASSIUM SERPL-SCNC: 4.5 MMOL/L — SIGNIFICANT CHANGE UP (ref 3.5–5.3)
POTASSIUM SERPL-SCNC: 4.6 MMOL/L — SIGNIFICANT CHANGE UP (ref 3.5–5.3)
POTASSIUM SERPL-SCNC: 4.7 MMOL/L — SIGNIFICANT CHANGE UP (ref 3.5–5.3)
POTASSIUM SERPL-SCNC: 4.9 MMOL/L — SIGNIFICANT CHANGE UP (ref 3.5–5.3)
POTASSIUM SERPL-SCNC: 4.9 MMOL/L — SIGNIFICANT CHANGE UP (ref 3.5–5.3)
POTASSIUM SERPL-SCNC: 5 MMOL/L — SIGNIFICANT CHANGE UP (ref 3.5–5.3)
POTASSIUM SERPL-SCNC: 5.1 MMOL/L — SIGNIFICANT CHANGE UP (ref 3.5–5.3)
POTASSIUM SERPL-SCNC: 5.2 MMOL/L — SIGNIFICANT CHANGE UP (ref 3.5–5.3)
POTASSIUM SERPL-SCNC: 5.3 MMOL/L — SIGNIFICANT CHANGE UP (ref 3.5–5.3)
PROT SERPL-MCNC: 5.4 G/DL — LOW (ref 6–8.3)
PROT SERPL-MCNC: 5.5 G/DL — LOW (ref 6–8.3)
PROT SERPL-MCNC: 5.6 G/DL — LOW (ref 6–8.3)
PROT SERPL-MCNC: 5.7 G/DL — LOW (ref 6–8.3)
PROT SERPL-MCNC: 5.9 G/DL — LOW (ref 6–8.3)
PROT SERPL-MCNC: 6.8 G/DL — SIGNIFICANT CHANGE UP (ref 6–8.3)
PROT UR-MCNC: 30 MG/DL
PROTHROM AB SERPL-ACNC: 13.8 SEC — HIGH (ref 9.9–13.4)
PROTHROM AB SERPL-ACNC: 14.7 SEC — HIGH (ref 9.9–13.4)
PROTHROM AB SERPL-ACNC: 15 SEC — HIGH (ref 9.9–13.4)
PROTHROM AB SERPL-ACNC: 15.1 SEC — HIGH (ref 9.9–13.4)
PROTHROM AB SERPL-ACNC: 20.8 SEC — HIGH (ref 9.9–13.4)
RBC # BLD: 2.76 M/UL — LOW (ref 4.2–5.8)
RBC # BLD: 2.78 M/UL — LOW (ref 4.2–5.8)
RBC # BLD: 2.94 M/UL — LOW (ref 4.2–5.8)
RBC # BLD: 2.94 M/UL — LOW (ref 4.2–5.8)
RBC # BLD: 3.06 M/UL — LOW (ref 4.2–5.8)
RBC # BLD: 3.06 M/UL — LOW (ref 4.2–5.8)
RBC # BLD: 3.08 M/UL — LOW (ref 4.2–5.8)
RBC # BLD: 3.09 M/UL — LOW (ref 4.2–5.8)
RBC # BLD: 3.1 M/UL — LOW (ref 4.2–5.8)
RBC # BLD: 3.11 M/UL — LOW (ref 4.2–5.8)
RBC # BLD: 3.13 M/UL — LOW (ref 4.2–5.8)
RBC # BLD: 3.18 M/UL — LOW (ref 4.2–5.8)
RBC # BLD: 3.22 M/UL — LOW (ref 4.2–5.8)
RBC # BLD: 3.22 M/UL — LOW (ref 4.2–5.8)
RBC # BLD: 3.24 M/UL — LOW (ref 4.2–5.8)
RBC # BLD: 3.29 M/UL — LOW (ref 4.2–5.8)
RBC # BLD: 3.3 M/UL — LOW (ref 4.2–5.8)
RBC # BLD: 3.32 M/UL — LOW (ref 4.2–5.8)
RBC # BLD: 3.35 M/UL — LOW (ref 4.2–5.8)
RBC # BLD: 3.37 M/UL — LOW (ref 4.2–5.8)
RBC # BLD: 3.38 M/UL — LOW (ref 4.2–5.8)
RBC # BLD: 3.41 M/UL — LOW (ref 4.2–5.8)
RBC # BLD: 3.43 M/UL — LOW (ref 4.2–5.8)
RBC # BLD: 3.49 M/UL — LOW (ref 4.2–5.8)
RBC # BLD: 3.5 M/UL — LOW (ref 4.2–5.8)
RBC # BLD: 3.52 M/UL — LOW (ref 4.2–5.8)
RBC # BLD: 3.52 M/UL — LOW (ref 4.2–5.8)
RBC # BLD: 3.57 M/UL — LOW (ref 4.2–5.8)
RBC # BLD: 3.64 M/UL — LOW (ref 4.2–5.8)
RBC # BLD: 3.65 M/UL — LOW (ref 4.2–5.8)
RBC # BLD: 3.67 M/UL — LOW (ref 4.2–5.8)
RBC # BLD: 3.67 M/UL — LOW (ref 4.2–5.8)
RBC # BLD: 3.71 M/UL — LOW (ref 4.2–5.8)
RBC # BLD: 3.74 M/UL — LOW (ref 4.2–5.8)
RBC # BLD: 3.76 M/UL — LOW (ref 4.2–5.8)
RBC # BLD: 3.8 M/UL — LOW (ref 4.2–5.8)
RBC # BLD: 3.84 M/UL — LOW (ref 4.2–5.8)
RBC # BLD: 3.88 M/UL — LOW (ref 4.2–5.8)
RBC # BLD: 3.89 M/UL — LOW (ref 4.2–5.8)
RBC # BLD: 4.07 M/UL — LOW (ref 4.2–5.8)
RBC # BLD: 4.39 M/UL — SIGNIFICANT CHANGE UP (ref 4.2–5.8)
RBC # FLD: 13.1 % — SIGNIFICANT CHANGE UP (ref 10.3–14.5)
RBC # FLD: 13.1 % — SIGNIFICANT CHANGE UP (ref 10.3–14.5)
RBC # FLD: 13.2 % — SIGNIFICANT CHANGE UP (ref 10.3–14.5)
RBC # FLD: 13.3 % — SIGNIFICANT CHANGE UP (ref 10.3–14.5)
RBC # FLD: 13.4 % — SIGNIFICANT CHANGE UP (ref 10.3–14.5)
RBC # FLD: 13.7 % — SIGNIFICANT CHANGE UP (ref 10.3–14.5)
RBC # FLD: 13.7 % — SIGNIFICANT CHANGE UP (ref 10.3–14.5)
RBC # FLD: 13.8 % — SIGNIFICANT CHANGE UP (ref 10.3–14.5)
RBC # FLD: 13.9 % — SIGNIFICANT CHANGE UP (ref 10.3–14.5)
RBC # FLD: 14 % — SIGNIFICANT CHANGE UP (ref 10.3–14.5)
RBC # FLD: 14.1 % — SIGNIFICANT CHANGE UP (ref 10.3–14.5)
RBC # FLD: 14.2 % — SIGNIFICANT CHANGE UP (ref 10.3–14.5)
RBC # FLD: 14.5 % — SIGNIFICANT CHANGE UP (ref 10.3–14.5)
RBC # FLD: 15.2 % — HIGH (ref 10.3–14.5)
RBC # FLD: 15.5 % — HIGH (ref 10.3–14.5)
RBC # FLD: 15.7 % — HIGH (ref 10.3–14.5)
RBC # FLD: 15.9 % — HIGH (ref 10.3–14.5)
RBC # FLD: 16.1 % — HIGH (ref 10.3–14.5)
RBC # FLD: 16.1 % — HIGH (ref 10.3–14.5)
RBC # FLD: 16.3 % — HIGH (ref 10.3–14.5)
RBC # FLD: 16.4 % — HIGH (ref 10.3–14.5)
RBC # FLD: 16.6 % — HIGH (ref 10.3–14.5)
RBC # FLD: 16.6 % — HIGH (ref 10.3–14.5)
RBC # FLD: 16.7 % — HIGH (ref 10.3–14.5)
RBC # FLD: 17 % — HIGH (ref 10.3–14.5)
RBC # FLD: 17.2 % — HIGH (ref 10.3–14.5)
RBC # FLD: 17.5 % — HIGH (ref 10.3–14.5)
RBC # FLD: 17.7 % — HIGH (ref 10.3–14.5)
RBC # FLD: 17.8 % — HIGH (ref 10.3–14.5)
RBC # FLD: 17.9 % — HIGH (ref 10.3–14.5)
RBC BLD AUTO: ABNORMAL
RBC CASTS # UR COMP ASSIST: 226 /HPF — HIGH (ref 0–4)
REVIEW: SIGNIFICANT CHANGE UP
RH IG SCN BLD-IMP: POSITIVE — SIGNIFICANT CHANGE UP
RH IG SCN BLD-IMP: POSITIVE — SIGNIFICANT CHANGE UP
RSV RNA NPH QL NAA+NON-PROBE: SIGNIFICANT CHANGE UP
S AUREUS DNA NOSE QL NAA+PROBE: SIGNIFICANT CHANGE UP
S AUREUS DNA NOSE QL NAA+PROBE: SIGNIFICANT CHANGE UP
SAO2 % BLDA: 98.2 % — HIGH (ref 94–98)
SAO2 % BLDV: 27.7 % — LOW (ref 67–88)
SARS-COV-2 RNA SPEC QL NAA+PROBE: SIGNIFICANT CHANGE UP
SODIUM SERPL-SCNC: 123 MMOL/L — LOW (ref 135–145)
SODIUM SERPL-SCNC: 126 MMOL/L — LOW (ref 135–145)
SODIUM SERPL-SCNC: 128 MMOL/L — LOW (ref 135–145)
SODIUM SERPL-SCNC: 129 MMOL/L — LOW (ref 135–145)
SODIUM SERPL-SCNC: 130 MMOL/L — LOW (ref 135–145)
SODIUM SERPL-SCNC: 131 MMOL/L — LOW (ref 135–145)
SODIUM SERPL-SCNC: 131 MMOL/L — LOW (ref 135–145)
SODIUM SERPL-SCNC: 132 MMOL/L — LOW (ref 135–145)
SODIUM SERPL-SCNC: 133 MMOL/L — LOW (ref 135–145)
SODIUM SERPL-SCNC: 134 MMOL/L — LOW (ref 135–145)
SODIUM SERPL-SCNC: 135 MMOL/L — SIGNIFICANT CHANGE UP (ref 135–145)
SODIUM SERPL-SCNC: 136 MMOL/L — SIGNIFICANT CHANGE UP (ref 135–145)
SODIUM SERPL-SCNC: 137 MMOL/L — SIGNIFICANT CHANGE UP (ref 135–145)
SODIUM SERPL-SCNC: 137 MMOL/L — SIGNIFICANT CHANGE UP (ref 135–145)
SOURCE RESPIRATORY: SIGNIFICANT CHANGE UP
SOURCE RESPIRATORY: SIGNIFICANT CHANGE UP
SP GR SPEC: 1.01 — SIGNIFICANT CHANGE UP (ref 1–1.03)
SPECIMEN SOURCE: SIGNIFICANT CHANGE UP
SQUAMOUS # UR AUTO: 0 /HPF — SIGNIFICANT CHANGE UP (ref 0–5)
SURGICAL PATHOLOGY STUDY: SIGNIFICANT CHANGE UP
TIBC SERPL-MCNC: 150 UG/DL — LOW (ref 220–430)
UIBC SERPL-MCNC: 116 UG/DL — SIGNIFICANT CHANGE UP (ref 110–370)
UROBILINOGEN FLD QL: 0.2 MG/DL — SIGNIFICANT CHANGE UP (ref 0.2–1)
WBC # BLD: 10.14 K/UL — SIGNIFICANT CHANGE UP (ref 3.8–10.5)
WBC # BLD: 10.69 K/UL — HIGH (ref 3.8–10.5)
WBC # BLD: 10.79 K/UL — HIGH (ref 3.8–10.5)
WBC # BLD: 10.94 K/UL — HIGH (ref 3.8–10.5)
WBC # BLD: 11.15 K/UL — HIGH (ref 3.8–10.5)
WBC # BLD: 11.58 K/UL — HIGH (ref 3.8–10.5)
WBC # BLD: 11.77 K/UL — HIGH (ref 3.8–10.5)
WBC # BLD: 11.98 K/UL — HIGH (ref 3.8–10.5)
WBC # BLD: 12.14 K/UL — HIGH (ref 3.8–10.5)
WBC # BLD: 12.22 K/UL — HIGH (ref 3.8–10.5)
WBC # BLD: 12.24 K/UL — HIGH (ref 3.8–10.5)
WBC # BLD: 12.9 K/UL — HIGH (ref 3.8–10.5)
WBC # BLD: 12.93 K/UL — HIGH (ref 3.8–10.5)
WBC # BLD: 12.94 K/UL — HIGH (ref 3.8–10.5)
WBC # BLD: 13.26 K/UL — HIGH (ref 3.8–10.5)
WBC # BLD: 13.86 K/UL — HIGH (ref 3.8–10.5)
WBC # BLD: 14.13 K/UL — HIGH (ref 3.8–10.5)
WBC # BLD: 14.41 K/UL — HIGH (ref 3.8–10.5)
WBC # BLD: 15.12 K/UL — HIGH (ref 3.8–10.5)
WBC # BLD: 16.5 K/UL — HIGH (ref 3.8–10.5)
WBC # BLD: 16.69 K/UL — HIGH (ref 3.8–10.5)
WBC # BLD: 6.03 K/UL — SIGNIFICANT CHANGE UP (ref 3.8–10.5)
WBC # BLD: 6.14 K/UL — SIGNIFICANT CHANGE UP (ref 3.8–10.5)
WBC # BLD: 6.3 K/UL — SIGNIFICANT CHANGE UP (ref 3.8–10.5)
WBC # BLD: 6.38 K/UL — SIGNIFICANT CHANGE UP (ref 3.8–10.5)
WBC # BLD: 6.87 K/UL — SIGNIFICANT CHANGE UP (ref 3.8–10.5)
WBC # BLD: 6.92 K/UL — SIGNIFICANT CHANGE UP (ref 3.8–10.5)
WBC # BLD: 6.99 K/UL — SIGNIFICANT CHANGE UP (ref 3.8–10.5)
WBC # BLD: 7.14 K/UL — SIGNIFICANT CHANGE UP (ref 3.8–10.5)
WBC # BLD: 7.32 K/UL — SIGNIFICANT CHANGE UP (ref 3.8–10.5)
WBC # BLD: 7.68 K/UL — SIGNIFICANT CHANGE UP (ref 3.8–10.5)
WBC # BLD: 7.83 K/UL — SIGNIFICANT CHANGE UP (ref 3.8–10.5)
WBC # BLD: 7.99 K/UL — SIGNIFICANT CHANGE UP (ref 3.8–10.5)
WBC # BLD: 8.2 K/UL — SIGNIFICANT CHANGE UP (ref 3.8–10.5)
WBC # BLD: 8.36 K/UL — SIGNIFICANT CHANGE UP (ref 3.8–10.5)
WBC # BLD: 8.51 K/UL — SIGNIFICANT CHANGE UP (ref 3.8–10.5)
WBC # BLD: 8.81 K/UL — SIGNIFICANT CHANGE UP (ref 3.8–10.5)
WBC # BLD: 8.84 K/UL — SIGNIFICANT CHANGE UP (ref 3.8–10.5)
WBC # BLD: 8.98 K/UL — SIGNIFICANT CHANGE UP (ref 3.8–10.5)
WBC # BLD: 9.29 K/UL — SIGNIFICANT CHANGE UP (ref 3.8–10.5)
WBC # BLD: 9.32 K/UL — SIGNIFICANT CHANGE UP (ref 3.8–10.5)
WBC # BLD: 9.37 K/UL — SIGNIFICANT CHANGE UP (ref 3.8–10.5)
WBC # BLD: 9.6 K/UL — SIGNIFICANT CHANGE UP (ref 3.8–10.5)
WBC # FLD AUTO: 10.14 K/UL — SIGNIFICANT CHANGE UP (ref 3.8–10.5)
WBC # FLD AUTO: 10.69 K/UL — HIGH (ref 3.8–10.5)
WBC # FLD AUTO: 10.79 K/UL — HIGH (ref 3.8–10.5)
WBC # FLD AUTO: 10.94 K/UL — HIGH (ref 3.8–10.5)
WBC # FLD AUTO: 11.15 K/UL — HIGH (ref 3.8–10.5)
WBC # FLD AUTO: 11.58 K/UL — HIGH (ref 3.8–10.5)
WBC # FLD AUTO: 11.77 K/UL — HIGH (ref 3.8–10.5)
WBC # FLD AUTO: 11.98 K/UL — HIGH (ref 3.8–10.5)
WBC # FLD AUTO: 12.14 K/UL — HIGH (ref 3.8–10.5)
WBC # FLD AUTO: 12.22 K/UL — HIGH (ref 3.8–10.5)
WBC # FLD AUTO: 12.24 K/UL — HIGH (ref 3.8–10.5)
WBC # FLD AUTO: 12.9 K/UL — HIGH (ref 3.8–10.5)
WBC # FLD AUTO: 12.93 K/UL — HIGH (ref 3.8–10.5)
WBC # FLD AUTO: 12.94 K/UL — HIGH (ref 3.8–10.5)
WBC # FLD AUTO: 13.26 K/UL — HIGH (ref 3.8–10.5)
WBC # FLD AUTO: 13.86 K/UL — HIGH (ref 3.8–10.5)
WBC # FLD AUTO: 14.13 K/UL — HIGH (ref 3.8–10.5)
WBC # FLD AUTO: 14.41 K/UL — HIGH (ref 3.8–10.5)
WBC # FLD AUTO: 15.12 K/UL — HIGH (ref 3.8–10.5)
WBC # FLD AUTO: 16.5 K/UL — HIGH (ref 3.8–10.5)
WBC # FLD AUTO: 16.69 K/UL — HIGH (ref 3.8–10.5)
WBC # FLD AUTO: 6.03 K/UL — SIGNIFICANT CHANGE UP (ref 3.8–10.5)
WBC # FLD AUTO: 6.14 K/UL — SIGNIFICANT CHANGE UP (ref 3.8–10.5)
WBC # FLD AUTO: 6.3 K/UL — SIGNIFICANT CHANGE UP (ref 3.8–10.5)
WBC # FLD AUTO: 6.38 K/UL — SIGNIFICANT CHANGE UP (ref 3.8–10.5)
WBC # FLD AUTO: 6.87 K/UL — SIGNIFICANT CHANGE UP (ref 3.8–10.5)
WBC # FLD AUTO: 6.92 K/UL — SIGNIFICANT CHANGE UP (ref 3.8–10.5)
WBC # FLD AUTO: 6.99 K/UL — SIGNIFICANT CHANGE UP (ref 3.8–10.5)
WBC # FLD AUTO: 7.14 K/UL — SIGNIFICANT CHANGE UP (ref 3.8–10.5)
WBC # FLD AUTO: 7.32 K/UL — SIGNIFICANT CHANGE UP (ref 3.8–10.5)
WBC # FLD AUTO: 7.68 K/UL — SIGNIFICANT CHANGE UP (ref 3.8–10.5)
WBC # FLD AUTO: 7.83 K/UL — SIGNIFICANT CHANGE UP (ref 3.8–10.5)
WBC # FLD AUTO: 7.99 K/UL — SIGNIFICANT CHANGE UP (ref 3.8–10.5)
WBC # FLD AUTO: 8.2 K/UL — SIGNIFICANT CHANGE UP (ref 3.8–10.5)
WBC # FLD AUTO: 8.36 K/UL — SIGNIFICANT CHANGE UP (ref 3.8–10.5)
WBC # FLD AUTO: 8.51 K/UL — SIGNIFICANT CHANGE UP (ref 3.8–10.5)
WBC # FLD AUTO: 8.81 K/UL — SIGNIFICANT CHANGE UP (ref 3.8–10.5)
WBC # FLD AUTO: 8.84 K/UL — SIGNIFICANT CHANGE UP (ref 3.8–10.5)
WBC # FLD AUTO: 8.98 K/UL — SIGNIFICANT CHANGE UP (ref 3.8–10.5)
WBC # FLD AUTO: 9.29 K/UL — SIGNIFICANT CHANGE UP (ref 3.8–10.5)
WBC # FLD AUTO: 9.32 K/UL — SIGNIFICANT CHANGE UP (ref 3.8–10.5)
WBC # FLD AUTO: 9.37 K/UL — SIGNIFICANT CHANGE UP (ref 3.8–10.5)
WBC # FLD AUTO: 9.6 K/UL — SIGNIFICANT CHANGE UP (ref 3.8–10.5)
WBC CLUMPS # UR AUTO: PRESENT
WBC UR QL: 326 /HPF — HIGH (ref 0–5)

## 2025-01-01 PROCEDURE — 99233 SBSQ HOSP IP/OBS HIGH 50: CPT

## 2025-01-01 PROCEDURE — 87637 SARSCOV2&INF A&B&RSV AMP PRB: CPT

## 2025-01-01 PROCEDURE — 88304 TISSUE EXAM BY PATHOLOGIST: CPT

## 2025-01-01 PROCEDURE — 99497 ADVNCD CARE PLAN 30 MIN: CPT | Mod: 25

## 2025-01-01 PROCEDURE — 99233 SBSQ HOSP IP/OBS HIGH 50: CPT | Mod: GC

## 2025-01-01 PROCEDURE — C1751: CPT

## 2025-01-01 PROCEDURE — 71045 X-RAY EXAM CHEST 1 VIEW: CPT | Mod: 26

## 2025-01-01 PROCEDURE — 80053 COMPREHEN METABOLIC PANEL: CPT

## 2025-01-01 PROCEDURE — 99223 1ST HOSP IP/OBS HIGH 75: CPT | Mod: GC

## 2025-01-01 PROCEDURE — 85730 THROMBOPLASTIN TIME PARTIAL: CPT

## 2025-01-01 PROCEDURE — 71250 CT THORAX DX C-: CPT | Mod: 26

## 2025-01-01 PROCEDURE — 97530 THERAPEUTIC ACTIVITIES: CPT

## 2025-01-01 PROCEDURE — 36558 INSERT TUNNELED CV CATH: CPT | Mod: RT

## 2025-01-01 PROCEDURE — 93306 TTE W/DOPPLER COMPLETE: CPT

## 2025-01-01 PROCEDURE — C9399: CPT

## 2025-01-01 PROCEDURE — 71250 CT THORAX DX C-: CPT | Mod: MC

## 2025-01-01 PROCEDURE — 73564 X-RAY EXAM KNEE 4 OR MORE: CPT | Mod: 26,50

## 2025-01-01 PROCEDURE — 97597 DBRDMT OPN WND 1ST 20 CM/<: CPT

## 2025-01-01 PROCEDURE — 75635 CT ANGIO ABDOMINAL ARTERIES: CPT | Mod: 26

## 2025-01-01 PROCEDURE — 84100 ASSAY OF PHOSPHORUS: CPT

## 2025-01-01 PROCEDURE — 76377 3D RENDER W/INTRP POSTPROCES: CPT

## 2025-01-01 PROCEDURE — 99222 1ST HOSP IP/OBS MODERATE 55: CPT

## 2025-01-01 PROCEDURE — 99285 EMERGENCY DEPT VISIT HI MDM: CPT | Mod: 25

## 2025-01-01 PROCEDURE — 85014 HEMATOCRIT: CPT

## 2025-01-01 PROCEDURE — 73564 X-RAY EXAM KNEE 4 OR MORE: CPT

## 2025-01-01 PROCEDURE — 87186 SC STD MICRODIL/AGAR DIL: CPT

## 2025-01-01 PROCEDURE — G0316 PROLONG INPT EVAL ADD15 M: CPT | Mod: GC

## 2025-01-01 PROCEDURE — 36600 WITHDRAWAL OF ARTERIAL BLOOD: CPT

## 2025-01-01 PROCEDURE — 83605 ASSAY OF LACTIC ACID: CPT

## 2025-01-01 PROCEDURE — 99285 EMERGENCY DEPT VISIT HI MDM: CPT

## 2025-01-01 PROCEDURE — C1750: CPT

## 2025-01-01 PROCEDURE — 97598 DBRDMT OPN WND ADDL 20CM/<: CPT

## 2025-01-01 PROCEDURE — 86704 HEP B CORE ANTIBODY TOTAL: CPT

## 2025-01-01 PROCEDURE — 51705 CHANGE OF BLADDER TUBE: CPT

## 2025-01-01 PROCEDURE — C1876: CPT

## 2025-01-01 PROCEDURE — 84132 ASSAY OF SERUM POTASSIUM: CPT

## 2025-01-01 PROCEDURE — 70450 CT HEAD/BRAIN W/O DYE: CPT | Mod: MC

## 2025-01-01 PROCEDURE — 36569 INSJ PICC 5 YR+ W/O IMAGING: CPT

## 2025-01-01 PROCEDURE — 97110 THERAPEUTIC EXERCISES: CPT

## 2025-01-01 PROCEDURE — C1889: CPT

## 2025-01-01 PROCEDURE — 76000 FLUOROSCOPY <1 HR PHYS/QHP: CPT

## 2025-01-01 PROCEDURE — 36415 COLL VENOUS BLD VENIPUNCTURE: CPT

## 2025-01-01 PROCEDURE — 73700 CT LOWER EXTREMITY W/O DYE: CPT | Mod: MC

## 2025-01-01 PROCEDURE — 71260 CT THORAX DX C+: CPT | Mod: 26

## 2025-01-01 PROCEDURE — C1725: CPT

## 2025-01-01 PROCEDURE — 75635 CT ANGIO ABDOMINAL ARTERIES: CPT | Mod: MC

## 2025-01-01 PROCEDURE — 99261: CPT

## 2025-01-01 PROCEDURE — 36558 INSERT TUNNELED CV CATH: CPT

## 2025-01-01 PROCEDURE — 73564 X-RAY EXAM KNEE 4 OR MORE: CPT | Mod: 26,LT

## 2025-01-01 PROCEDURE — 87040 BLOOD CULTURE FOR BACTERIA: CPT

## 2025-01-01 PROCEDURE — 85025 COMPLETE CBC W/AUTO DIFF WBC: CPT

## 2025-01-01 PROCEDURE — 74177 CT ABD & PELVIS W/CONTRAST: CPT | Mod: 26

## 2025-01-01 PROCEDURE — 73590 X-RAY EXAM OF LOWER LEG: CPT | Mod: 26,LT

## 2025-01-01 PROCEDURE — 73502 X-RAY EXAM HIP UNI 2-3 VIEWS: CPT | Mod: 26,LT

## 2025-01-01 PROCEDURE — 93010 ELECTROCARDIOGRAM REPORT: CPT

## 2025-01-01 PROCEDURE — 99223 1ST HOSP IP/OBS HIGH 75: CPT

## 2025-01-01 PROCEDURE — 83930 ASSAY OF BLOOD OSMOLALITY: CPT

## 2025-01-01 PROCEDURE — 80048 BASIC METABOLIC PNL TOTAL CA: CPT

## 2025-01-01 PROCEDURE — 97161 PT EVAL LOW COMPLEX 20 MIN: CPT

## 2025-01-01 PROCEDURE — 88304 TISSUE EXAM BY PATHOLOGIST: CPT | Mod: 26

## 2025-01-01 PROCEDURE — 99232 SBSQ HOSP IP/OBS MODERATE 35: CPT

## 2025-01-01 PROCEDURE — 86803 HEPATITIS C AB TEST: CPT

## 2025-01-01 PROCEDURE — 85027 COMPLETE CBC AUTOMATED: CPT

## 2025-01-01 PROCEDURE — 87077 CULTURE AEROBIC IDENTIFY: CPT

## 2025-01-01 PROCEDURE — 87086 URINE CULTURE/COLONY COUNT: CPT

## 2025-01-01 PROCEDURE — 93005 ELECTROCARDIOGRAM TRACING: CPT

## 2025-01-01 PROCEDURE — 86901 BLOOD TYPING SEROLOGIC RH(D): CPT

## 2025-01-01 PROCEDURE — 82550 ASSAY OF CK (CPK): CPT

## 2025-01-01 PROCEDURE — 87102 FUNGUS ISOLATION CULTURE: CPT

## 2025-01-01 PROCEDURE — 82962 GLUCOSE BLOOD TEST: CPT

## 2025-01-01 PROCEDURE — 93923 UPR/LXTR ART STDY 3+ LVLS: CPT

## 2025-01-01 PROCEDURE — 71260 CT THORAX DX C+: CPT | Mod: MC

## 2025-01-01 PROCEDURE — 73552 X-RAY EXAM OF FEMUR 2/>: CPT | Mod: 26,LT

## 2025-01-01 PROCEDURE — 87340 HEPATITIS B SURFACE AG IA: CPT

## 2025-01-01 PROCEDURE — 82533 TOTAL CORTISOL: CPT

## 2025-01-01 PROCEDURE — 76377 3D RENDER W/INTRP POSTPROCES: CPT | Mod: 26

## 2025-01-01 PROCEDURE — 70450 CT HEAD/BRAIN W/O DYE: CPT | Mod: 26

## 2025-01-01 PROCEDURE — 77001 FLUOROGUIDE FOR VEIN DEVICE: CPT | Mod: 26

## 2025-01-01 PROCEDURE — 94640 AIRWAY INHALATION TREATMENT: CPT

## 2025-01-01 PROCEDURE — 83540 ASSAY OF IRON: CPT

## 2025-01-01 PROCEDURE — 86850 RBC ANTIBODY SCREEN: CPT

## 2025-01-01 PROCEDURE — 93923 UPR/LXTR ART STDY 3+ LVLS: CPT | Mod: 26

## 2025-01-01 PROCEDURE — 88312 SPECIAL STAINS GROUP 1: CPT

## 2025-01-01 PROCEDURE — C1760: CPT

## 2025-01-01 PROCEDURE — 99233 SBSQ HOSP IP/OBS HIGH 50: CPT | Mod: 25

## 2025-01-01 PROCEDURE — 84295 ASSAY OF SERUM SODIUM: CPT

## 2025-01-01 PROCEDURE — 87206 SMEAR FLUORESCENT/ACID STAI: CPT

## 2025-01-01 PROCEDURE — 99221 1ST HOSP IP/OBS SF/LOW 40: CPT

## 2025-01-01 PROCEDURE — 71045 X-RAY EXAM CHEST 1 VIEW: CPT

## 2025-01-01 PROCEDURE — 87640 STAPH A DNA AMP PROBE: CPT

## 2025-01-01 PROCEDURE — 82330 ASSAY OF CALCIUM: CPT

## 2025-01-01 PROCEDURE — 99497 ADVNCD CARE PLAN 30 MIN: CPT | Mod: GC,25

## 2025-01-01 PROCEDURE — 87641 MR-STAPH DNA AMP PROBE: CPT

## 2025-01-01 PROCEDURE — 82435 ASSAY OF BLOOD CHLORIDE: CPT

## 2025-01-01 PROCEDURE — 88312 SPECIAL STAINS GROUP 1: CPT | Mod: 26

## 2025-01-01 PROCEDURE — 86706 HEP B SURFACE ANTIBODY: CPT

## 2025-01-01 PROCEDURE — 86900 BLOOD TYPING SEROLOGIC ABO: CPT

## 2025-01-01 PROCEDURE — 85018 HEMOGLOBIN: CPT

## 2025-01-01 PROCEDURE — 73700 CT LOWER EXTREMITY W/O DYE: CPT | Mod: 26,LT

## 2025-01-01 PROCEDURE — 85610 PROTHROMBIN TIME: CPT

## 2025-01-01 PROCEDURE — C1887: CPT

## 2025-01-01 PROCEDURE — 99231 SBSQ HOSP IP/OBS SF/LOW 25: CPT | Mod: 25

## 2025-01-01 PROCEDURE — 73590 X-RAY EXAM OF LOWER LEG: CPT

## 2025-01-01 PROCEDURE — 87070 CULTURE OTHR SPECIMN AEROBIC: CPT

## 2025-01-01 PROCEDURE — C1874: CPT

## 2025-01-01 PROCEDURE — 93970 EXTREMITY STUDY: CPT

## 2025-01-01 PROCEDURE — 83935 ASSAY OF URINE OSMOLALITY: CPT

## 2025-01-01 PROCEDURE — 73552 X-RAY EXAM OF FEMUR 2/>: CPT

## 2025-01-01 PROCEDURE — C1894: CPT

## 2025-01-01 PROCEDURE — 82728 ASSAY OF FERRITIN: CPT

## 2025-01-01 PROCEDURE — 77001 FLUOROGUIDE FOR VEIN DEVICE: CPT

## 2025-01-01 PROCEDURE — 82947 ASSAY GLUCOSE BLOOD QUANT: CPT

## 2025-01-01 PROCEDURE — 93970 EXTREMITY STUDY: CPT | Mod: 26

## 2025-01-01 PROCEDURE — 99222 1ST HOSP IP/OBS MODERATE 55: CPT | Mod: GC

## 2025-01-01 PROCEDURE — 99231 SBSQ HOSP IP/OBS SF/LOW 25: CPT | Mod: GC

## 2025-01-01 PROCEDURE — 87116 MYCOBACTERIA CULTURE: CPT

## 2025-01-01 PROCEDURE — 81001 URINALYSIS AUTO W/SCOPE: CPT

## 2025-01-01 PROCEDURE — 93306 TTE W/DOPPLER COMPLETE: CPT | Mod: 26

## 2025-01-01 PROCEDURE — 73502 X-RAY EXAM HIP UNI 2-3 VIEWS: CPT

## 2025-01-01 PROCEDURE — 74177 CT ABD & PELVIS W/CONTRAST: CPT | Mod: MC

## 2025-01-01 PROCEDURE — 83735 ASSAY OF MAGNESIUM: CPT

## 2025-01-01 PROCEDURE — C1769: CPT

## 2025-01-01 PROCEDURE — 82803 BLOOD GASES ANY COMBINATION: CPT

## 2025-01-01 PROCEDURE — 86705 HEP B CORE ANTIBODY IGM: CPT

## 2025-01-01 PROCEDURE — 83550 IRON BINDING TEST: CPT

## 2025-01-01 PROCEDURE — 97112 NEUROMUSCULAR REEDUCATION: CPT

## 2025-01-01 PROCEDURE — 76937 US GUIDE VASCULAR ACCESS: CPT

## 2025-01-01 PROCEDURE — 76937 US GUIDE VASCULAR ACCESS: CPT | Mod: 26

## 2025-01-01 PROCEDURE — 0241U: CPT

## 2025-01-01 DEVICE — IMPLANTABLE DEVICE: Type: IMPLANTABLE DEVICE | Status: FUNCTIONAL

## 2025-01-01 DEVICE — ANGIO CATH GLIDECATH ANGLE TAPER 5FR X 65CM: Type: IMPLANTABLE DEVICE | Status: FUNCTIONAL

## 2025-01-01 DEVICE — SHEATH INTRODUCER TERUMO PINNACLE PERIPHERAL 8FR X 10CM: Type: IMPLANTABLE DEVICE | Status: FUNCTIONAL

## 2025-01-01 DEVICE — BLLN MUSTANG 5X40MMX75CM: Type: IMPLANTABLE DEVICE | Status: FUNCTIONAL

## 2025-01-01 DEVICE — INTRODUCER MICROPUNCTURE STIFF 5FR X 10CM: Type: IMPLANTABLE DEVICE | Status: FUNCTIONAL

## 2025-01-01 DEVICE — GUIDEWIRE GLIDEWIRE ANGLED TIP 0.035" X 150CM LONG TAPER: Type: IMPLANTABLE DEVICE | Status: FUNCTIONAL

## 2025-01-01 DEVICE — GUIDEWIRE V-18 CONTROLWIRE STRAIGHT .018" X 300CM TAPER 8CM: Type: IMPLANTABLE DEVICE | Status: FUNCTIONAL

## 2025-01-01 DEVICE — GWIRE ANGL .035X150 STIFF: Type: IMPLANTABLE DEVICE | Status: FUNCTIONAL

## 2025-01-01 DEVICE — SHEATH INTRODUCER TERUMO PINNACLE PERIPHERAL 6FR X 10CM: Type: IMPLANTABLE DEVICE | Status: FUNCTIONAL

## 2025-01-01 DEVICE — INTRO SHEATH FLEXOR ANSEL 6F: Type: IMPLANTABLE DEVICE | Status: FUNCTIONAL

## 2025-01-01 DEVICE — GUIDEWIRE AMPLATZ EXTRA-STIFF CURVED .035" X 260CM: Type: IMPLANTABLE DEVICE | Status: FUNCTIONAL

## 2025-01-01 DEVICE — ANGIO CATH GLIDECATH ANGLE 4FR X 65CM: Type: IMPLANTABLE DEVICE | Status: FUNCTIONAL

## 2025-01-01 DEVICE — SHEATH INTRODUCER TERUMO PINNACLE CORONARY 4FR X 10CM X 0.035" MINI WIRE: Type: IMPLANTABLE DEVICE | Status: FUNCTIONAL

## 2025-01-01 DEVICE — KIT A-LINE 1LUM 20G X 12CM SAFE KIT: Type: IMPLANTABLE DEVICE | Status: FUNCTIONAL

## 2025-01-01 DEVICE — SHEATH INTRODUCER CORDIS BRITE TIP 6FR X 23CM (GREEN): Type: IMPLANTABLE DEVICE | Status: FUNCTIONAL

## 2025-01-01 DEVICE — DEVICE CLOSURE 6F/7F MYNX GRIP MUST ORDER MIN OF 10 EA: Type: IMPLANTABLE DEVICE | Status: FUNCTIONAL

## 2025-01-01 DEVICE — CATH OMNI FLSH 0.035IN 5FRX65: Type: IMPLANTABLE DEVICE | Status: FUNCTIONAL

## 2025-01-01 DEVICE — GUIDEWIRE CHOICE PT 0.014" X 182CM STRAIGHT: Type: IMPLANTABLE DEVICE | Status: FUNCTIONAL

## 2025-01-01 DEVICE — GUIDEWIRE V-18 CONTROLWIRE STRAIGHT .018" X 150CM TAPER 8CM: Type: IMPLANTABLE DEVICE | Status: FUNCTIONAL

## 2025-01-01 DEVICE — MICRO-INTRODUCER KIT 5FR 60CM NITINOL TC TIP 7CM ECHOGENIC REGULAR: Type: IMPLANTABLE DEVICE | Status: FUNCTIONAL

## 2025-01-01 RX ORDER — GABAPENTIN 400 MG/1
50 CAPSULE ORAL AT BEDTIME
Refills: 0 | Status: DISCONTINUED | OUTPATIENT
Start: 2025-01-01 | End: 2025-01-01

## 2025-01-01 RX ORDER — OXYCODONE HYDROCHLORIDE 30 MG/1
10 TABLET ORAL EVERY 4 HOURS
Refills: 0 | Status: DISCONTINUED | OUTPATIENT
Start: 2025-01-01 | End: 2025-01-01

## 2025-01-01 RX ORDER — HYDROMORPHONE/SOD CHLOR,ISO/PF 2 MG/10 ML
1 SYRINGE (ML) INJECTION
Refills: 0 | Status: DISCONTINUED | OUTPATIENT
Start: 2025-01-01 | End: 2025-01-01

## 2025-01-01 RX ORDER — ACETAMINOPHEN 500 MG/5ML
975 LIQUID (ML) ORAL ONCE
Refills: 0 | Status: COMPLETED | OUTPATIENT
Start: 2025-01-01 | End: 2025-01-01

## 2025-01-01 RX ORDER — CLOPIDOGREL BISULFATE 75 MG/1
75 TABLET, FILM COATED ORAL DAILY
Refills: 0 | Status: DISCONTINUED | OUTPATIENT
Start: 2025-01-01 | End: 2025-01-01

## 2025-01-01 RX ORDER — POVIDONE-IODINE 7.5 %
1 SOLUTION, NON-ORAL TOPICAL
Refills: 0 | Status: DISCONTINUED | OUTPATIENT
Start: 2025-01-01 | End: 2025-01-01

## 2025-01-01 RX ORDER — MIDODRINE HYDROCHLORIDE 5 MG/1
10 TABLET ORAL ONCE
Refills: 0 | Status: COMPLETED | OUTPATIENT
Start: 2025-01-01 | End: 2025-01-01

## 2025-01-01 RX ORDER — PIPERACILLIN-TAZO-DEXTROSE,ISO 3.375G/5
3.38 IV SOLUTION, PIGGYBACK PREMIX FROZEN(ML) INTRAVENOUS EVERY 12 HOURS
Refills: 0 | Status: DISCONTINUED | OUTPATIENT
Start: 2025-01-01 | End: 2025-01-01

## 2025-01-01 RX ORDER — ACETAMINOPHEN 500 MG/5ML
1000 LIQUID (ML) ORAL ONCE
Refills: 0 | Status: COMPLETED | OUTPATIENT
Start: 2025-01-01 | End: 2025-01-01

## 2025-01-01 RX ORDER — OXYCODONE HYDROCHLORIDE 30 MG/1
30 TABLET ORAL EVERY 12 HOURS
Refills: 0 | Status: DISCONTINUED | OUTPATIENT
Start: 2025-01-01 | End: 2025-01-01

## 2025-01-01 RX ORDER — MUPIROCIN CALCIUM 20 MG/G
1 CREAM TOPICAL
Refills: 0 | Status: DISCONTINUED | OUTPATIENT
Start: 2025-01-01 | End: 2025-01-01

## 2025-01-01 RX ORDER — HEPARIN SODIUM 1000 [USP'U]/ML
5000 INJECTION INTRAVENOUS; SUBCUTANEOUS EVERY 8 HOURS
Refills: 0 | Status: DISCONTINUED | OUTPATIENT
Start: 2025-01-01 | End: 2025-01-01

## 2025-01-01 RX ORDER — ALBUTEROL SULFATE 2.5 MG/3ML
2.5 VIAL, NEBULIZER (ML) INHALATION
Refills: 0 | Status: COMPLETED | OUTPATIENT
Start: 2025-01-01 | End: 2025-01-01

## 2025-01-01 RX ORDER — SODIUM CHLORIDE 9 G/1000ML
250 INJECTION, SOLUTION INTRAVENOUS ONCE
Refills: 0 | Status: COMPLETED | OUTPATIENT
Start: 2025-01-01 | End: 2025-01-01

## 2025-01-01 RX ORDER — HYDROMORPHONE/SOD CHLOR,ISO/PF 2 MG/10 ML
0.5 SYRINGE (ML) INJECTION
Refills: 0 | Status: DISCONTINUED | OUTPATIENT
Start: 2025-01-01 | End: 2025-01-01

## 2025-01-01 RX ORDER — ASPIRIN 325 MG
81 TABLET ORAL DAILY
Refills: 0 | Status: DISCONTINUED | OUTPATIENT
Start: 2025-01-01 | End: 2025-01-01

## 2025-01-01 RX ORDER — MELATONIN 5 MG
5 TABLET ORAL ONCE
Refills: 0 | Status: COMPLETED | OUTPATIENT
Start: 2025-01-01 | End: 2025-01-01

## 2025-01-01 RX ORDER — SULFAMETHOXAZOLE/TRIMETHOPRIM 800-160 MG
1 TABLET ORAL
Refills: 0 | Status: COMPLETED | OUTPATIENT
Start: 2025-01-01 | End: 2025-01-01

## 2025-01-01 RX ORDER — MEROPENEM 1 G/30ML
500 INJECTION INTRAVENOUS EVERY 24 HOURS
Refills: 0 | Status: DISCONTINUED | OUTPATIENT
Start: 2025-01-01 | End: 2025-01-01

## 2025-01-01 RX ORDER — POLYETHYLENE GLYCOL 3350 17 G/17G
17 POWDER, FOR SOLUTION ORAL DAILY
Refills: 0 | Status: DISCONTINUED | OUTPATIENT
Start: 2025-01-01 | End: 2025-01-01

## 2025-01-01 RX ORDER — HEPARIN SODIUM 1000 [USP'U]/ML
1200 INJECTION INTRAVENOUS; SUBCUTANEOUS
Qty: 25000 | Refills: 0 | Status: DISCONTINUED | OUTPATIENT
Start: 2025-01-01 | End: 2025-01-01

## 2025-01-01 RX ORDER — BENZONATATE 100 MG
100 CAPSULE ORAL THREE TIMES A DAY
Refills: 0 | Status: DISCONTINUED | OUTPATIENT
Start: 2025-01-01 | End: 2025-01-01

## 2025-01-01 RX ORDER — BISACODYL 5 MG
5 TABLET, DELAYED RELEASE (ENTERIC COATED) ORAL EVERY 12 HOURS
Refills: 0 | Status: DISCONTINUED | OUTPATIENT
Start: 2025-01-01 | End: 2025-01-01

## 2025-01-01 RX ORDER — IPRATROPIUM BROMIDE AND ALBUTEROL SULFATE .5; 2.5 MG/3ML; MG/3ML
3 SOLUTION RESPIRATORY (INHALATION) ONCE
Refills: 0 | Status: COMPLETED | OUTPATIENT
Start: 2025-01-01 | End: 2025-01-01

## 2025-01-01 RX ORDER — LACTULOSE 10 G/15ML
20 SOLUTION ORAL ONCE
Refills: 0 | Status: DISCONTINUED | OUTPATIENT
Start: 2025-01-01 | End: 2025-01-01

## 2025-01-01 RX ORDER — HYDROMORPHONE/SOD CHLOR,ISO/PF 2 MG/10 ML
0.5 SYRINGE (ML) INJECTION ONCE
Refills: 0 | Status: DISCONTINUED | OUTPATIENT
Start: 2025-01-01 | End: 2025-01-01

## 2025-01-01 RX ORDER — EPOETIN ALFA 10000 [IU]/ML
10000 SOLUTION INTRAVENOUS; SUBCUTANEOUS
Refills: 0 | Status: DISCONTINUED | OUTPATIENT
Start: 2025-01-01 | End: 2025-01-01

## 2025-01-01 RX ORDER — ALTEPLASE 2.2 MG/2ML
2 INJECTION, POWDER, LYOPHILIZED, FOR SOLUTION INTRAVENOUS ONCE
Refills: 0 | Status: COMPLETED | OUTPATIENT
Start: 2025-01-01 | End: 2025-01-01

## 2025-01-01 RX ORDER — MIDODRINE HYDROCHLORIDE 5 MG/1
20 TABLET ORAL ONCE
Refills: 0 | Status: COMPLETED | OUTPATIENT
Start: 2025-01-01 | End: 2025-01-01

## 2025-01-01 RX ORDER — HEPARIN SODIUM 1000 [USP'U]/ML
3500 INJECTION INTRAVENOUS; SUBCUTANEOUS EVERY 6 HOURS
Refills: 0 | Status: DISCONTINUED | OUTPATIENT
Start: 2025-01-01 | End: 2025-01-01

## 2025-01-01 RX ORDER — HYDROXYZINE HYDROCHLORIDE 25 MG/1
25 TABLET, FILM COATED ORAL ONCE
Refills: 0 | Status: COMPLETED | OUTPATIENT
Start: 2025-01-01 | End: 2025-01-01

## 2025-01-01 RX ORDER — MINERAL OIL
133 OIL (ML) MISCELLANEOUS ONCE
Refills: 0 | Status: DISCONTINUED | OUTPATIENT
Start: 2025-01-01 | End: 2025-01-01

## 2025-01-01 RX ORDER — HYDROMORPHONE/SOD CHLOR,ISO/PF 2 MG/10 ML
1 SYRINGE (ML) INJECTION EVERY 6 HOURS
Refills: 0 | Status: DISCONTINUED | OUTPATIENT
Start: 2025-01-01 | End: 2025-01-01

## 2025-01-01 RX ORDER — DEXTROMETHORPHAN HBR, GUAIFENESIN 200 MG/10ML
1200 LIQUID ORAL EVERY 12 HOURS
Refills: 0 | Status: DISCONTINUED | OUTPATIENT
Start: 2025-01-01 | End: 2025-01-01

## 2025-01-01 RX ORDER — HYDROXYZINE HYDROCHLORIDE 25 MG/1
25 TABLET, FILM COATED ORAL THREE TIMES A DAY
Refills: 0 | Status: DISCONTINUED | OUTPATIENT
Start: 2025-01-01 | End: 2025-01-01

## 2025-01-01 RX ORDER — SODIUM HYPOCHLORITE 0.12 MG/ML
1 SOLUTION TOPICAL EVERY 8 HOURS
Refills: 0 | Status: DISCONTINUED | OUTPATIENT
Start: 2025-01-01 | End: 2025-01-01

## 2025-01-01 RX ORDER — IPRATROPIUM BROMIDE AND ALBUTEROL SULFATE .5; 2.5 MG/3ML; MG/3ML
3 SOLUTION RESPIRATORY (INHALATION) EVERY 6 HOURS
Refills: 0 | Status: DISCONTINUED | OUTPATIENT
Start: 2025-01-01 | End: 2025-01-01

## 2025-01-01 RX ORDER — CEFEPIME 2 G/20ML
1000 INJECTION, POWDER, FOR SOLUTION INTRAVENOUS EVERY 24 HOURS
Refills: 0 | Status: DISCONTINUED | OUTPATIENT
Start: 2025-01-01 | End: 2025-01-01

## 2025-01-01 RX ORDER — HEPARIN SODIUM 1000 [USP'U]/ML
7500 INJECTION INTRAVENOUS; SUBCUTANEOUS EVERY 6 HOURS
Refills: 0 | Status: DISCONTINUED | OUTPATIENT
Start: 2025-01-01 | End: 2025-01-01

## 2025-01-01 RX ORDER — OXYCODONE HYDROCHLORIDE 30 MG/1
15 TABLET ORAL EVERY 12 HOURS
Refills: 0 | Status: DISCONTINUED | OUTPATIENT
Start: 2025-01-01 | End: 2025-01-01

## 2025-01-01 RX ORDER — ATORVASTATIN CALCIUM 80 MG/1
40 TABLET, FILM COATED ORAL AT BEDTIME
Refills: 0 | Status: DISCONTINUED | OUTPATIENT
Start: 2025-01-01 | End: 2025-01-01

## 2025-01-01 RX ORDER — ACETAMINOPHEN 500 MG/5ML
650 LIQUID (ML) ORAL EVERY 6 HOURS
Refills: 0 | Status: DISCONTINUED | OUTPATIENT
Start: 2025-01-01 | End: 2025-01-01

## 2025-01-01 RX ORDER — OXYCODONE HYDROCHLORIDE 30 MG/1
5 TABLET ORAL ONCE
Refills: 0 | Status: DISCONTINUED | OUTPATIENT
Start: 2025-01-01 | End: 2025-01-01

## 2025-01-01 RX ORDER — OXYCODONE HYDROCHLORIDE 30 MG/1
10 TABLET ORAL EVERY 6 HOURS
Refills: 0 | Status: DISCONTINUED | OUTPATIENT
Start: 2025-01-01 | End: 2025-01-01

## 2025-01-01 RX ORDER — IBUPROFEN 200 MG
400 TABLET ORAL EVERY 12 HOURS
Refills: 0 | Status: DISCONTINUED | OUTPATIENT
Start: 2025-01-01 | End: 2025-01-01

## 2025-01-01 RX ORDER — SODIUM CHLORIDE 9 G/1000ML
500 INJECTION, SOLUTION INTRAVENOUS ONCE
Refills: 0 | Status: COMPLETED | OUTPATIENT
Start: 2025-01-01 | End: 2025-01-01

## 2025-01-01 RX ORDER — HYDROMORPHONE/SOD CHLOR,ISO/PF 2 MG/10 ML
4 SYRINGE (ML) INJECTION EVERY 4 HOURS
Refills: 0 | Status: DISCONTINUED | OUTPATIENT
Start: 2025-01-01 | End: 2025-01-01

## 2025-01-01 RX ORDER — SENNA 187 MG
2 TABLET ORAL AT BEDTIME
Refills: 0 | Status: DISCONTINUED | OUTPATIENT
Start: 2025-01-01 | End: 2025-01-01

## 2025-01-01 RX ORDER — LACTOBACILLUS ACIDOPHILUS/PECT 75 MM-100
1 CAPSULE ORAL
Refills: 0 | Status: DISCONTINUED | OUTPATIENT
Start: 2025-01-01 | End: 2025-01-01

## 2025-01-01 RX ORDER — SODIUM BICARBONATE 1 MEQ/ML
1950 SYRINGE (ML) INTRAVENOUS THREE TIMES A DAY
Refills: 0 | Status: DISCONTINUED | OUTPATIENT
Start: 2025-01-01 | End: 2025-01-01

## 2025-01-01 RX ORDER — KETOCONAZOLE 20 MG/G
1 CREAM TOPICAL
Refills: 0 | Status: DISCONTINUED | OUTPATIENT
Start: 2025-01-01 | End: 2025-01-01

## 2025-01-01 RX ORDER — METHOCARBAMOL 500 MG/1
750 TABLET, FILM COATED ORAL EVERY 8 HOURS
Refills: 0 | Status: DISCONTINUED | OUTPATIENT
Start: 2025-01-01 | End: 2025-01-01

## 2025-01-01 RX ORDER — GABAPENTIN 400 MG/1
300 CAPSULE ORAL AT BEDTIME
Refills: 0 | Status: DISCONTINUED | OUTPATIENT
Start: 2025-01-01 | End: 2025-01-01

## 2025-01-01 RX ORDER — VANCOMYCIN HCL IN 5 % DEXTROSE 1.5G/250ML
1000 PLASTIC BAG, INJECTION (ML) INTRAVENOUS ONCE
Refills: 0 | Status: COMPLETED | OUTPATIENT
Start: 2025-01-01 | End: 2025-01-01

## 2025-01-01 RX ORDER — METOCLOPRAMIDE HCL 10 MG
5 TABLET ORAL ONCE
Refills: 0 | Status: COMPLETED | OUTPATIENT
Start: 2025-01-01 | End: 2025-01-01

## 2025-01-01 RX ORDER — DIPHENHYDRAMINE HCL 12.5MG/5ML
25 ELIXIR ORAL ONCE
Refills: 0 | Status: COMPLETED | OUTPATIENT
Start: 2025-01-01 | End: 2025-01-01

## 2025-01-01 RX ORDER — IRON SUCROSE 20 MG/ML
100 INJECTION, SOLUTION INTRAVENOUS
Refills: 0 | Status: DISCONTINUED | OUTPATIENT
Start: 2025-01-01 | End: 2025-01-01

## 2025-01-01 RX ORDER — BENZONATATE 100 MG
100 CAPSULE ORAL EVERY 8 HOURS
Refills: 0 | Status: DISCONTINUED | OUTPATIENT
Start: 2025-01-01 | End: 2025-01-01

## 2025-01-01 RX ORDER — HYDROMORPHONE/SOD CHLOR,ISO/PF 2 MG/10 ML
2 SYRINGE (ML) INJECTION EVERY 6 HOURS
Refills: 0 | Status: DISCONTINUED | OUTPATIENT
Start: 2025-01-01 | End: 2025-01-01

## 2025-01-01 RX ORDER — HYDROMORPHONE/SOD CHLOR,ISO/PF 2 MG/10 ML
2 SYRINGE (ML) INJECTION EVERY 4 HOURS
Refills: 0 | Status: DISCONTINUED | OUTPATIENT
Start: 2025-01-01 | End: 2025-01-01

## 2025-01-01 RX ORDER — OXYCODONE HYDROCHLORIDE 30 MG/1
20 TABLET ORAL EVERY 8 HOURS
Refills: 0 | Status: DISCONTINUED | OUTPATIENT
Start: 2025-01-01 | End: 2025-01-01

## 2025-01-01 RX ORDER — HEPARIN SODIUM 1000 [USP'U]/ML
1300 INJECTION INTRAVENOUS; SUBCUTANEOUS
Qty: 25000 | Refills: 0 | Status: DISCONTINUED | OUTPATIENT
Start: 2025-01-01 | End: 2025-01-01

## 2025-01-01 RX ORDER — DAPTOMYCIN 500 MG/10ML
900 INJECTION, POWDER, LYOPHILIZED, FOR SOLUTION INTRAVENOUS
Refills: 0 | Status: DISCONTINUED | OUTPATIENT
Start: 2025-01-01 | End: 2025-01-01

## 2025-01-01 RX ORDER — HYDROMORPHONE/SOD CHLOR,ISO/PF 2 MG/10 ML
0.2 SYRINGE (ML) INJECTION ONCE
Refills: 0 | Status: DISCONTINUED | OUTPATIENT
Start: 2025-01-01 | End: 2025-01-01

## 2025-01-01 RX ORDER — EPOETIN ALFA 10000 [IU]/ML
10000 SOLUTION INTRAVENOUS; SUBCUTANEOUS ONCE
Refills: 0 | Status: COMPLETED | OUTPATIENT
Start: 2025-01-01 | End: 2025-01-01

## 2025-01-01 RX ORDER — MEROPENEM 1 G/30ML
500 INJECTION INTRAVENOUS EVERY 12 HOURS
Refills: 0 | Status: DISCONTINUED | OUTPATIENT
Start: 2025-01-01 | End: 2025-01-01

## 2025-01-01 RX ORDER — LORAZEPAM 4 MG/ML
0.5 VIAL (ML) INJECTION EVERY 4 HOURS
Refills: 0 | Status: DISCONTINUED | OUTPATIENT
Start: 2025-01-01 | End: 2025-01-01

## 2025-01-01 RX ORDER — OXYCODONE HYDROCHLORIDE 30 MG/1
10 TABLET ORAL ONCE
Refills: 0 | Status: DISCONTINUED | OUTPATIENT
Start: 2025-01-01 | End: 2025-01-01

## 2025-01-01 RX ORDER — OXYCODONE HYDROCHLORIDE 30 MG/1
20 TABLET ORAL EVERY 8 HOURS
Refills: 0 | Status: COMPLETED | OUTPATIENT
Start: 2025-01-01 | End: 2025-01-01

## 2025-01-01 RX ORDER — PIPERACILLIN-TAZO-DEXTROSE,ISO 3.375G/5
3.38 IV SOLUTION, PIGGYBACK PREMIX FROZEN(ML) INTRAVENOUS ONCE
Refills: 0 | Status: COMPLETED | OUTPATIENT
Start: 2025-01-01 | End: 2025-01-01

## 2025-01-01 RX ORDER — SODIUM CHLORIDE 3 G/100ML
500 INJECTION, SOLUTION INTRAVENOUS
Refills: 0 | Status: DISCONTINUED | OUTPATIENT
Start: 2025-01-01 | End: 2025-01-01

## 2025-01-01 RX ORDER — OXYCODONE HYDROCHLORIDE 30 MG/1
5 TABLET ORAL EVERY 8 HOURS
Refills: 0 | Status: DISCONTINUED | OUTPATIENT
Start: 2025-01-01 | End: 2025-01-01

## 2025-01-01 RX ORDER — ACETYLCYSTEINE 200 MG/ML
4 INHALANT RESPIRATORY (INHALATION) EVERY 6 HOURS
Refills: 0 | Status: COMPLETED | OUTPATIENT
Start: 2025-01-01 | End: 2025-01-01

## 2025-01-01 RX ORDER — ALBUTEROL SULFATE 2.5 MG/3ML
2.5 VIAL, NEBULIZER (ML) INHALATION
Refills: 0 | Status: DISCONTINUED | OUTPATIENT
Start: 2025-01-01 | End: 2025-01-01

## 2025-01-01 RX ORDER — ONDANSETRON HCL/PF 4 MG/2 ML
4 VIAL (ML) INJECTION ONCE
Refills: 0 | Status: COMPLETED | OUTPATIENT
Start: 2025-01-01 | End: 2025-01-01

## 2025-01-01 RX ORDER — SODIUM CHLORIDE 9 G/1000ML
1000 INJECTION, SOLUTION INTRAVENOUS ONCE
Refills: 0 | Status: COMPLETED | OUTPATIENT
Start: 2025-01-01 | End: 2025-01-01

## 2025-01-01 RX ORDER — HYDROXYZINE HYDROCHLORIDE 25 MG/1
25 TABLET, FILM COATED ORAL ONCE
Refills: 0 | Status: DISCONTINUED | OUTPATIENT
Start: 2025-01-01 | End: 2025-01-01

## 2025-01-01 RX ORDER — BACITRACIN ZINC AND POLYMYXIN B SULFATE 500; 10000 [USP'U]/G; [USP'U]/G
1 OINTMENT TOPICAL
Refills: 0 | Status: DISCONTINUED | OUTPATIENT
Start: 2025-01-01 | End: 2025-01-01

## 2025-01-01 RX ORDER — LIDOCAINE HYDROCHLORIDE 20 MG/ML
1 JELLY TOPICAL ONCE
Refills: 0 | Status: COMPLETED | OUTPATIENT
Start: 2025-01-01 | End: 2025-01-01

## 2025-01-01 RX ORDER — HYDROXYZINE HYDROCHLORIDE 25 MG/1
25 TABLET, FILM COATED ORAL
Refills: 0 | Status: DISCONTINUED | OUTPATIENT
Start: 2025-01-01 | End: 2025-01-01

## 2025-01-01 RX ORDER — HYDROMORPHONE/SOD CHLOR,ISO/PF 2 MG/10 ML
1 SYRINGE (ML) INJECTION EVERY 4 HOURS
Refills: 0 | Status: DISCONTINUED | OUTPATIENT
Start: 2025-01-01 | End: 2025-01-01

## 2025-01-01 RX ORDER — MIDODRINE HYDROCHLORIDE 5 MG/1
10 TABLET ORAL
Refills: 0 | Status: DISCONTINUED | OUTPATIENT
Start: 2025-01-01 | End: 2025-01-01

## 2025-01-01 RX ORDER — KETOROLAC TROMETHAMINE 30 MG/ML
15 INJECTION, SOLUTION INTRAMUSCULAR; INTRAVENOUS ONCE
Refills: 0 | Status: DISCONTINUED | OUTPATIENT
Start: 2025-01-01 | End: 2025-01-01

## 2025-01-01 RX ORDER — PREGABALIN 75 MG/1
25 CAPSULE ORAL
Refills: 0 | Status: DISCONTINUED | OUTPATIENT
Start: 2025-01-01 | End: 2025-01-01

## 2025-01-01 RX ORDER — HYDROMORPHONE/SOD CHLOR,ISO/PF 2 MG/10 ML
1 SYRINGE (ML) INJECTION ONCE
Refills: 0 | Status: DISCONTINUED | OUTPATIENT
Start: 2025-01-01 | End: 2025-01-01

## 2025-01-01 RX ORDER — SODIUM CHLORIDE 9 G/1000ML
1000 INJECTION, SOLUTION INTRAVENOUS
Refills: 0 | Status: DISCONTINUED | OUTPATIENT
Start: 2025-01-01 | End: 2025-01-01

## 2025-01-01 RX ORDER — CLOPIDOGREL BISULFATE 75 MG/1
300 TABLET, FILM COATED ORAL ONCE
Refills: 0 | Status: COMPLETED | OUTPATIENT
Start: 2025-01-01 | End: 2025-01-01

## 2025-01-01 RX ORDER — HEPARIN SODIUM 1000 [USP'U]/ML
INJECTION INTRAVENOUS; SUBCUTANEOUS
Qty: 25000 | Refills: 0 | Status: DISCONTINUED | OUTPATIENT
Start: 2025-01-01 | End: 2025-01-01

## 2025-01-01 RX ORDER — LACTULOSE 10 G/15ML
20 SOLUTION ORAL ONCE
Refills: 0 | Status: COMPLETED | OUTPATIENT
Start: 2025-01-01 | End: 2025-01-01

## 2025-01-01 RX ORDER — B1/B2/B3/B5/B6/B12/VIT C/FOLIC 500-0.5 MG
1 TABLET ORAL DAILY
Refills: 0 | Status: DISCONTINUED | OUTPATIENT
Start: 2025-01-01 | End: 2025-01-01

## 2025-01-01 RX ORDER — ACETAMINOPHEN 500 MG/5ML
1000 LIQUID (ML) ORAL ONCE
Refills: 0 | Status: DISCONTINUED | OUTPATIENT
Start: 2025-01-01 | End: 2025-01-01

## 2025-01-01 RX ORDER — SULFAMETHOXAZOLE/TRIMETHOPRIM 800-160 MG
1 TABLET ORAL ONCE
Refills: 0 | Status: COMPLETED | OUTPATIENT
Start: 2025-01-01 | End: 2025-01-01

## 2025-01-01 RX ORDER — SODIUM ZIRCONIUM CYCLOSILICATE 5 G/5G
10 POWDER, FOR SUSPENSION ORAL DAILY
Refills: 0 | Status: DISCONTINUED | OUTPATIENT
Start: 2025-01-01 | End: 2025-01-01

## 2025-01-01 RX ORDER — MELATONIN 5 MG
3 TABLET ORAL ONCE
Refills: 0 | Status: COMPLETED | OUTPATIENT
Start: 2025-01-01 | End: 2025-01-01

## 2025-01-01 RX ORDER — SIMETHICONE 80 MG
80 TABLET,CHEWABLE ORAL EVERY 6 HOURS
Refills: 0 | Status: COMPLETED | OUTPATIENT
Start: 2025-01-01 | End: 2025-01-01

## 2025-01-01 RX ORDER — GLYCOPYRROLATE 0.2 MG/ML
0.4 INJECTION INTRAMUSCULAR; INTRAVENOUS EVERY 6 HOURS
Refills: 0 | Status: DISCONTINUED | OUTPATIENT
Start: 2025-01-01 | End: 2025-01-01

## 2025-01-01 RX ORDER — LACTULOSE 10 G/15ML
10 SOLUTION ORAL DAILY
Refills: 0 | Status: COMPLETED | OUTPATIENT
Start: 2025-01-01 | End: 2025-01-01

## 2025-01-01 RX ORDER — HYDROMORPHONE/SOD CHLOR,ISO/PF 2 MG/10 ML
2 SYRINGE (ML) INJECTION ONCE
Refills: 0 | Status: DISCONTINUED | OUTPATIENT
Start: 2025-01-01 | End: 2025-01-01

## 2025-01-01 RX ORDER — EPOETIN ALFA 10000 [IU]/ML
10000 SOLUTION INTRAVENOUS; SUBCUTANEOUS
Refills: 0 | Status: COMPLETED | OUTPATIENT
Start: 2025-01-01 | End: 2025-01-01

## 2025-01-01 RX ADMIN — Medication 1 APPLICATION(S): at 14:37

## 2025-01-01 RX ADMIN — BACITRACIN ZINC AND POLYMYXIN B SULFATE 1 APPLICATION(S): 500; 10000 OINTMENT TOPICAL at 04:56

## 2025-01-01 RX ADMIN — OXYCODONE HYDROCHLORIDE 30 MILLIGRAM(S): 30 TABLET ORAL at 17:27

## 2025-01-01 RX ADMIN — DEXTROMETHORPHAN HBR, GUAIFENESIN 1200 MILLIGRAM(S): 200 LIQUID ORAL at 05:15

## 2025-01-01 RX ADMIN — Medication 2 MILLIGRAM(S): at 03:21

## 2025-01-01 RX ADMIN — ATORVASTATIN CALCIUM 40 MILLIGRAM(S): 80 TABLET, FILM COATED ORAL at 21:29

## 2025-01-01 RX ADMIN — Medication 2 MILLIGRAM(S): at 12:26

## 2025-01-01 RX ADMIN — OXYCODONE HYDROCHLORIDE 30 MILLIGRAM(S): 30 TABLET ORAL at 05:16

## 2025-01-01 RX ADMIN — DEXTROMETHORPHAN HBR, GUAIFENESIN 1200 MILLIGRAM(S): 200 LIQUID ORAL at 17:07

## 2025-01-01 RX ADMIN — Medication 4 MILLIGRAM(S): at 14:21

## 2025-01-01 RX ADMIN — POLYETHYLENE GLYCOL 3350 17 GRAM(S): 17 POWDER, FOR SOLUTION ORAL at 11:26

## 2025-01-01 RX ADMIN — SODIUM HYPOCHLORITE 1 APPLICATION(S): 0.12 SOLUTION TOPICAL at 05:35

## 2025-01-01 RX ADMIN — Medication 2 MILLIGRAM(S): at 10:05

## 2025-01-01 RX ADMIN — HYDROXYZINE HYDROCHLORIDE 25 MILLIGRAM(S): 25 TABLET, FILM COATED ORAL at 23:02

## 2025-01-01 RX ADMIN — OXYCODONE HYDROCHLORIDE 30 MILLIGRAM(S): 30 TABLET ORAL at 05:13

## 2025-01-01 RX ADMIN — SODIUM HYPOCHLORITE 1 APPLICATION(S): 0.12 SOLUTION TOPICAL at 13:14

## 2025-01-01 RX ADMIN — OXYCODONE HYDROCHLORIDE 30 MILLIGRAM(S): 30 TABLET ORAL at 18:06

## 2025-01-01 RX ADMIN — OXYCODONE HYDROCHLORIDE 30 MILLIGRAM(S): 30 TABLET ORAL at 06:42

## 2025-01-01 RX ADMIN — Medication 400 MILLIGRAM(S): at 17:06

## 2025-01-01 RX ADMIN — IPRATROPIUM BROMIDE AND ALBUTEROL SULFATE 3 MILLILITER(S): .5; 2.5 SOLUTION RESPIRATORY (INHALATION) at 18:34

## 2025-01-01 RX ADMIN — IPRATROPIUM BROMIDE AND ALBUTEROL SULFATE 3 MILLILITER(S): .5; 2.5 SOLUTION RESPIRATORY (INHALATION) at 23:41

## 2025-01-01 RX ADMIN — Medication 400 MILLIGRAM(S): at 05:34

## 2025-01-01 RX ADMIN — HYDROXYZINE HYDROCHLORIDE 25 MILLIGRAM(S): 25 TABLET, FILM COATED ORAL at 21:35

## 2025-01-01 RX ADMIN — HYDROXYZINE HYDROCHLORIDE 25 MILLIGRAM(S): 25 TABLET, FILM COATED ORAL at 05:34

## 2025-01-01 RX ADMIN — Medication 1 TABLET(S): at 05:12

## 2025-01-01 RX ADMIN — Medication 1 APPLICATION(S): at 06:02

## 2025-01-01 RX ADMIN — MUPIROCIN CALCIUM 1 APPLICATION(S): 20 CREAM TOPICAL at 16:08

## 2025-01-01 RX ADMIN — ATORVASTATIN CALCIUM 40 MILLIGRAM(S): 80 TABLET, FILM COATED ORAL at 21:03

## 2025-01-01 RX ADMIN — HEPARIN SODIUM 5000 UNIT(S): 1000 INJECTION INTRAVENOUS; SUBCUTANEOUS at 21:47

## 2025-01-01 RX ADMIN — Medication 2 TABLET(S): at 22:45

## 2025-01-01 RX ADMIN — OXYCODONE HYDROCHLORIDE 10 MILLIGRAM(S): 30 TABLET ORAL at 21:05

## 2025-01-01 RX ADMIN — IPRATROPIUM BROMIDE AND ALBUTEROL SULFATE 3 MILLILITER(S): .5; 2.5 SOLUTION RESPIRATORY (INHALATION) at 04:48

## 2025-01-01 RX ADMIN — Medication 1 APPLICATION(S): at 11:18

## 2025-01-01 RX ADMIN — ACETYLCYSTEINE 4 MILLILITER(S): 200 INHALANT RESPIRATORY (INHALATION) at 11:01

## 2025-01-01 RX ADMIN — Medication 2 MILLIGRAM(S): at 13:17

## 2025-01-01 RX ADMIN — Medication 40 MILLIGRAM(S): at 05:09

## 2025-01-01 RX ADMIN — BACITRACIN ZINC AND POLYMYXIN B SULFATE 1 APPLICATION(S): 500; 10000 OINTMENT TOPICAL at 05:56

## 2025-01-01 RX ADMIN — HEPARIN SODIUM 5000 UNIT(S): 1000 INJECTION INTRAVENOUS; SUBCUTANEOUS at 06:06

## 2025-01-01 RX ADMIN — HEPARIN SODIUM 5000 UNIT(S): 1000 INJECTION INTRAVENOUS; SUBCUTANEOUS at 05:17

## 2025-01-01 RX ADMIN — OXYCODONE HYDROCHLORIDE 10 MILLIGRAM(S): 30 TABLET ORAL at 02:49

## 2025-01-01 RX ADMIN — OXYCODONE HYDROCHLORIDE 10 MILLIGRAM(S): 30 TABLET ORAL at 18:33

## 2025-01-01 RX ADMIN — IPRATROPIUM BROMIDE AND ALBUTEROL SULFATE 3 MILLILITER(S): .5; 2.5 SOLUTION RESPIRATORY (INHALATION) at 05:03

## 2025-01-01 RX ADMIN — Medication 2 MILLIGRAM(S): at 12:00

## 2025-01-01 RX ADMIN — OXYCODONE HYDROCHLORIDE 5 MILLIGRAM(S): 30 TABLET ORAL at 09:57

## 2025-01-01 RX ADMIN — OXYCODONE HYDROCHLORIDE 10 MILLIGRAM(S): 30 TABLET ORAL at 22:20

## 2025-01-01 RX ADMIN — Medication 2 MILLIGRAM(S): at 21:32

## 2025-01-01 RX ADMIN — OXYCODONE HYDROCHLORIDE 10 MILLIGRAM(S): 30 TABLET ORAL at 12:17

## 2025-01-01 RX ADMIN — Medication 5 MILLIGRAM(S): at 21:52

## 2025-01-01 RX ADMIN — HEPARIN SODIUM 1300 UNIT(S)/HR: 1000 INJECTION INTRAVENOUS; SUBCUTANEOUS at 22:30

## 2025-01-01 RX ADMIN — HEPARIN SODIUM 5000 UNIT(S): 1000 INJECTION INTRAVENOUS; SUBCUTANEOUS at 06:04

## 2025-01-01 RX ADMIN — HEPARIN SODIUM 3500 UNIT(S): 1000 INJECTION INTRAVENOUS; SUBCUTANEOUS at 14:48

## 2025-01-01 RX ADMIN — Medication 0.5 MILLIGRAM(S): at 18:49

## 2025-01-01 RX ADMIN — Medication 2 MILLIGRAM(S): at 11:29

## 2025-01-01 RX ADMIN — Medication 1 TABLET(S): at 12:16

## 2025-01-01 RX ADMIN — Medication 1 APPLICATION(S): at 06:38

## 2025-01-01 RX ADMIN — HYDROXYZINE HYDROCHLORIDE 25 MILLIGRAM(S): 25 TABLET, FILM COATED ORAL at 08:13

## 2025-01-01 RX ADMIN — Medication 40 MILLIGRAM(S): at 06:01

## 2025-01-01 RX ADMIN — HEPARIN SODIUM 5000 UNIT(S): 1000 INJECTION INTRAVENOUS; SUBCUTANEOUS at 21:11

## 2025-01-01 RX ADMIN — Medication 2 MILLIGRAM(S): at 20:03

## 2025-01-01 RX ADMIN — Medication 25 GRAM(S): at 12:12

## 2025-01-01 RX ADMIN — Medication 1 LOZENGE: at 02:09

## 2025-01-01 RX ADMIN — HEPARIN SODIUM 5000 UNIT(S): 1000 INJECTION INTRAVENOUS; SUBCUTANEOUS at 20:58

## 2025-01-01 RX ADMIN — OXYCODONE HYDROCHLORIDE 10 MILLIGRAM(S): 30 TABLET ORAL at 10:38

## 2025-01-01 RX ADMIN — MEROPENEM 100 MILLIGRAM(S): 1 INJECTION INTRAVENOUS at 17:40

## 2025-01-01 RX ADMIN — Medication 2 MILLIGRAM(S): at 23:32

## 2025-01-01 RX ADMIN — HEPARIN SODIUM 1300 UNIT(S)/HR: 1000 INJECTION INTRAVENOUS; SUBCUTANEOUS at 07:15

## 2025-01-01 RX ADMIN — Medication 2 MILLIGRAM(S): at 20:24

## 2025-01-01 RX ADMIN — Medication 400 MILLIGRAM(S): at 05:52

## 2025-01-01 RX ADMIN — GABAPENTIN 300 MILLIGRAM(S): 400 CAPSULE ORAL at 21:11

## 2025-01-01 RX ADMIN — PREGABALIN 25 MILLIGRAM(S): 75 CAPSULE ORAL at 17:12

## 2025-01-01 RX ADMIN — EPOETIN ALFA 10000 UNIT(S): 10000 SOLUTION INTRAVENOUS; SUBCUTANEOUS at 09:23

## 2025-01-01 RX ADMIN — Medication 40 MILLIGRAM(S): at 05:03

## 2025-01-01 RX ADMIN — Medication 400 MILLIGRAM(S): at 18:33

## 2025-01-01 RX ADMIN — HEPARIN SODIUM 5000 UNIT(S): 1000 INJECTION INTRAVENOUS; SUBCUTANEOUS at 13:48

## 2025-01-01 RX ADMIN — PREGABALIN 25 MILLIGRAM(S): 75 CAPSULE ORAL at 18:54

## 2025-01-01 RX ADMIN — Medication 1 MILLIGRAM(S): at 11:33

## 2025-01-01 RX ADMIN — Medication 1 APPLICATION(S): at 05:15

## 2025-01-01 RX ADMIN — HYDROXYZINE HYDROCHLORIDE 25 MILLIGRAM(S): 25 TABLET, FILM COATED ORAL at 21:04

## 2025-01-01 RX ADMIN — Medication 1 TABLET(S): at 17:11

## 2025-01-01 RX ADMIN — CLOPIDOGREL BISULFATE 75 MILLIGRAM(S): 75 TABLET, FILM COATED ORAL at 12:06

## 2025-01-01 RX ADMIN — OXYCODONE HYDROCHLORIDE 10 MILLIGRAM(S): 30 TABLET ORAL at 22:06

## 2025-01-01 RX ADMIN — Medication 1 TABLET(S): at 16:30

## 2025-01-01 RX ADMIN — Medication 400 MILLIGRAM(S): at 17:00

## 2025-01-01 RX ADMIN — CLOPIDOGREL BISULFATE 75 MILLIGRAM(S): 75 TABLET, FILM COATED ORAL at 12:53

## 2025-01-01 RX ADMIN — OXYCODONE HYDROCHLORIDE 30 MILLIGRAM(S): 30 TABLET ORAL at 05:31

## 2025-01-01 RX ADMIN — Medication 5 MILLIGRAM(S): at 20:59

## 2025-01-01 RX ADMIN — Medication 2 MILLIGRAM(S): at 08:10

## 2025-01-01 RX ADMIN — HYDROXYZINE HYDROCHLORIDE 25 MILLIGRAM(S): 25 TABLET, FILM COATED ORAL at 06:08

## 2025-01-01 RX ADMIN — Medication 400 MILLIGRAM(S): at 08:51

## 2025-01-01 RX ADMIN — Medication 1 APPLICATION(S): at 12:26

## 2025-01-01 RX ADMIN — ATORVASTATIN CALCIUM 40 MILLIGRAM(S): 80 TABLET, FILM COATED ORAL at 21:53

## 2025-01-01 RX ADMIN — OXYCODONE HYDROCHLORIDE 20 MILLIGRAM(S): 30 TABLET ORAL at 05:00

## 2025-01-01 RX ADMIN — BACITRACIN ZINC AND POLYMYXIN B SULFATE 1 APPLICATION(S): 500; 10000 OINTMENT TOPICAL at 17:02

## 2025-01-01 RX ADMIN — MEROPENEM 100 MILLIGRAM(S): 1 INJECTION INTRAVENOUS at 15:03

## 2025-01-01 RX ADMIN — Medication 40 MILLIGRAM(S): at 05:17

## 2025-01-01 RX ADMIN — ACETYLCYSTEINE 4 MILLILITER(S): 200 INHALANT RESPIRATORY (INHALATION) at 17:05

## 2025-01-01 RX ADMIN — MEROPENEM 100 MILLIGRAM(S): 1 INJECTION INTRAVENOUS at 17:33

## 2025-01-01 RX ADMIN — MUPIROCIN CALCIUM 1 APPLICATION(S): 20 CREAM TOPICAL at 13:06

## 2025-01-01 RX ADMIN — Medication 1 TABLET(S): at 08:23

## 2025-01-01 RX ADMIN — ACETYLCYSTEINE 4 MILLILITER(S): 200 INHALANT RESPIRATORY (INHALATION) at 05:03

## 2025-01-01 RX ADMIN — OXYCODONE HYDROCHLORIDE 10 MILLIGRAM(S): 30 TABLET ORAL at 09:45

## 2025-01-01 RX ADMIN — Medication 400 MILLIGRAM(S): at 05:01

## 2025-01-01 RX ADMIN — Medication 40 MILLIGRAM(S): at 07:27

## 2025-01-01 RX ADMIN — MUPIROCIN CALCIUM 1 APPLICATION(S): 20 CREAM TOPICAL at 12:18

## 2025-01-01 RX ADMIN — IPRATROPIUM BROMIDE AND ALBUTEROL SULFATE 3 MILLILITER(S): .5; 2.5 SOLUTION RESPIRATORY (INHALATION) at 16:33

## 2025-01-01 RX ADMIN — Medication 1 APPLICATION(S): at 14:12

## 2025-01-01 RX ADMIN — MIDODRINE HYDROCHLORIDE 10 MILLIGRAM(S): 5 TABLET ORAL at 10:27

## 2025-01-01 RX ADMIN — Medication 2 TABLET(S): at 00:15

## 2025-01-01 RX ADMIN — Medication 2 MILLIGRAM(S): at 10:00

## 2025-01-01 RX ADMIN — MEROPENEM 100 MILLIGRAM(S): 1 INJECTION INTRAVENOUS at 05:54

## 2025-01-01 RX ADMIN — GABAPENTIN 300 MILLIGRAM(S): 400 CAPSULE ORAL at 21:26

## 2025-01-01 RX ADMIN — OXYCODONE HYDROCHLORIDE 10 MILLIGRAM(S): 30 TABLET ORAL at 03:54

## 2025-01-01 RX ADMIN — OXYCODONE HYDROCHLORIDE 10 MILLIGRAM(S): 30 TABLET ORAL at 13:33

## 2025-01-01 RX ADMIN — Medication 1 TABLET(S): at 11:54

## 2025-01-01 RX ADMIN — Medication 81 MILLIGRAM(S): at 13:15

## 2025-01-01 RX ADMIN — HEPARIN SODIUM 5000 UNIT(S): 1000 INJECTION INTRAVENOUS; SUBCUTANEOUS at 06:10

## 2025-01-01 RX ADMIN — OXYCODONE HYDROCHLORIDE 30 MILLIGRAM(S): 30 TABLET ORAL at 17:12

## 2025-01-01 RX ADMIN — MUPIROCIN CALCIUM 1 APPLICATION(S): 20 CREAM TOPICAL at 09:20

## 2025-01-01 RX ADMIN — HYDROXYZINE HYDROCHLORIDE 25 MILLIGRAM(S): 25 TABLET, FILM COATED ORAL at 13:14

## 2025-01-01 RX ADMIN — HYDROXYZINE HYDROCHLORIDE 25 MILLIGRAM(S): 25 TABLET, FILM COATED ORAL at 21:19

## 2025-01-01 RX ADMIN — Medication 2001 MILLIGRAM(S): at 23:45

## 2025-01-01 RX ADMIN — HYDROXYZINE HYDROCHLORIDE 25 MILLIGRAM(S): 25 TABLET, FILM COATED ORAL at 22:26

## 2025-01-01 RX ADMIN — SODIUM HYPOCHLORITE 1 APPLICATION(S): 0.12 SOLUTION TOPICAL at 13:49

## 2025-01-01 RX ADMIN — OXYCODONE HYDROCHLORIDE 10 MILLIGRAM(S): 30 TABLET ORAL at 23:45

## 2025-01-01 RX ADMIN — Medication 2 MILLIGRAM(S): at 12:15

## 2025-01-01 RX ADMIN — Medication 1 TABLET(S): at 08:13

## 2025-01-01 RX ADMIN — ATORVASTATIN CALCIUM 40 MILLIGRAM(S): 80 TABLET, FILM COATED ORAL at 22:08

## 2025-01-01 RX ADMIN — HEPARIN SODIUM 5000 UNIT(S): 1000 INJECTION INTRAVENOUS; SUBCUTANEOUS at 21:29

## 2025-01-01 RX ADMIN — SODIUM HYPOCHLORITE 1 APPLICATION(S): 0.12 SOLUTION TOPICAL at 06:01

## 2025-01-01 RX ADMIN — DEXTROMETHORPHAN HBR, GUAIFENESIN 1200 MILLIGRAM(S): 200 LIQUID ORAL at 06:37

## 2025-01-01 RX ADMIN — DEXTROMETHORPHAN HBR, GUAIFENESIN 1200 MILLIGRAM(S): 200 LIQUID ORAL at 05:29

## 2025-01-01 RX ADMIN — SODIUM HYPOCHLORITE 1 APPLICATION(S): 0.12 SOLUTION TOPICAL at 14:19

## 2025-01-01 RX ADMIN — OXYCODONE HYDROCHLORIDE 30 MILLIGRAM(S): 30 TABLET ORAL at 07:08

## 2025-01-01 RX ADMIN — ACETYLCYSTEINE 4 MILLILITER(S): 200 INHALANT RESPIRATORY (INHALATION) at 05:35

## 2025-01-01 RX ADMIN — HEPARIN SODIUM 5000 UNIT(S): 1000 INJECTION INTRAVENOUS; SUBCUTANEOUS at 22:45

## 2025-01-01 RX ADMIN — Medication 400 MILLIGRAM(S): at 05:43

## 2025-01-01 RX ADMIN — Medication 2 MILLIGRAM(S): at 21:04

## 2025-01-01 RX ADMIN — Medication 2 MILLIGRAM(S): at 10:46

## 2025-01-01 RX ADMIN — Medication 1 TABLET(S): at 17:25

## 2025-01-01 RX ADMIN — Medication 400 MILLIGRAM(S): at 05:59

## 2025-01-01 RX ADMIN — Medication 1 APPLICATION(S): at 06:25

## 2025-01-01 RX ADMIN — Medication 400 MILLIGRAM(S): at 18:01

## 2025-01-01 RX ADMIN — OXYCODONE HYDROCHLORIDE 10 MILLIGRAM(S): 30 TABLET ORAL at 16:00

## 2025-01-01 RX ADMIN — Medication 2 MILLIGRAM(S): at 12:03

## 2025-01-01 RX ADMIN — OXYCODONE HYDROCHLORIDE 10 MILLIGRAM(S): 30 TABLET ORAL at 23:52

## 2025-01-01 RX ADMIN — Medication 1 APPLICATION(S): at 11:56

## 2025-01-01 RX ADMIN — OXYCODONE HYDROCHLORIDE 10 MILLIGRAM(S): 30 TABLET ORAL at 12:07

## 2025-01-01 RX ADMIN — SODIUM HYPOCHLORITE 1 APPLICATION(S): 0.12 SOLUTION TOPICAL at 14:37

## 2025-01-01 RX ADMIN — Medication 1 TABLET(S): at 11:32

## 2025-01-01 RX ADMIN — IPRATROPIUM BROMIDE AND ALBUTEROL SULFATE 3 MILLILITER(S): .5; 2.5 SOLUTION RESPIRATORY (INHALATION) at 23:50

## 2025-01-01 RX ADMIN — Medication 2 TABLET(S): at 21:19

## 2025-01-01 RX ADMIN — HEPARIN SODIUM 5000 UNIT(S): 1000 INJECTION INTRAVENOUS; SUBCUTANEOUS at 13:58

## 2025-01-01 RX ADMIN — Medication 4 MILLIGRAM(S): at 13:38

## 2025-01-01 RX ADMIN — HYDROXYZINE HYDROCHLORIDE 25 MILLIGRAM(S): 25 TABLET, FILM COATED ORAL at 21:40

## 2025-01-01 RX ADMIN — EPOETIN ALFA 10000 UNIT(S): 10000 SOLUTION INTRAVENOUS; SUBCUTANEOUS at 12:49

## 2025-01-01 RX ADMIN — HYDROXYZINE HYDROCHLORIDE 25 MILLIGRAM(S): 25 TABLET, FILM COATED ORAL at 21:05

## 2025-01-01 RX ADMIN — HYDROXYZINE HYDROCHLORIDE 25 MILLIGRAM(S): 25 TABLET, FILM COATED ORAL at 13:24

## 2025-01-01 RX ADMIN — Medication 1 TABLET(S): at 16:07

## 2025-01-01 RX ADMIN — IPRATROPIUM BROMIDE AND ALBUTEROL SULFATE 3 MILLILITER(S): .5; 2.5 SOLUTION RESPIRATORY (INHALATION) at 05:29

## 2025-01-01 RX ADMIN — Medication 1 APPLICATION(S): at 18:51

## 2025-01-01 RX ADMIN — OXYCODONE HYDROCHLORIDE 30 MILLIGRAM(S): 30 TABLET ORAL at 17:39

## 2025-01-01 RX ADMIN — HEPARIN SODIUM 5000 UNIT(S): 1000 INJECTION INTRAVENOUS; SUBCUTANEOUS at 21:40

## 2025-01-01 RX ADMIN — MUPIROCIN CALCIUM 1 APPLICATION(S): 20 CREAM TOPICAL at 08:56

## 2025-01-01 RX ADMIN — DEXTROMETHORPHAN HBR, GUAIFENESIN 1200 MILLIGRAM(S): 200 LIQUID ORAL at 17:00

## 2025-01-01 RX ADMIN — ATORVASTATIN CALCIUM 40 MILLIGRAM(S): 80 TABLET, FILM COATED ORAL at 20:59

## 2025-01-01 RX ADMIN — ATORVASTATIN CALCIUM 40 MILLIGRAM(S): 80 TABLET, FILM COATED ORAL at 21:16

## 2025-01-01 RX ADMIN — Medication 1 TABLET(S): at 17:51

## 2025-01-01 RX ADMIN — HEPARIN SODIUM 5000 UNIT(S): 1000 INJECTION INTRAVENOUS; SUBCUTANEOUS at 21:54

## 2025-01-01 RX ADMIN — HEPARIN SODIUM 5000 UNIT(S): 1000 INJECTION INTRAVENOUS; SUBCUTANEOUS at 06:51

## 2025-01-01 RX ADMIN — Medication 2 MILLIGRAM(S): at 11:58

## 2025-01-01 RX ADMIN — CLOPIDOGREL BISULFATE 75 MILLIGRAM(S): 75 TABLET, FILM COATED ORAL at 11:31

## 2025-01-01 RX ADMIN — Medication 2 MILLIGRAM(S): at 10:15

## 2025-01-01 RX ADMIN — Medication 400 MILLIGRAM(S): at 17:40

## 2025-01-01 RX ADMIN — CLOPIDOGREL BISULFATE 75 MILLIGRAM(S): 75 TABLET, FILM COATED ORAL at 12:10

## 2025-01-01 RX ADMIN — MEROPENEM 100 MILLIGRAM(S): 1 INJECTION INTRAVENOUS at 06:42

## 2025-01-01 RX ADMIN — Medication 1950 MILLIGRAM(S): at 05:57

## 2025-01-01 RX ADMIN — SODIUM HYPOCHLORITE 1 APPLICATION(S): 0.12 SOLUTION TOPICAL at 21:06

## 2025-01-01 RX ADMIN — HYDROXYZINE HYDROCHLORIDE 25 MILLIGRAM(S): 25 TABLET, FILM COATED ORAL at 05:04

## 2025-01-01 RX ADMIN — EPOETIN ALFA 10000 UNIT(S): 10000 SOLUTION INTRAVENOUS; SUBCUTANEOUS at 13:28

## 2025-01-01 RX ADMIN — OXYCODONE HYDROCHLORIDE 10 MILLIGRAM(S): 30 TABLET ORAL at 22:16

## 2025-01-01 RX ADMIN — OXYCODONE HYDROCHLORIDE 10 MILLIGRAM(S): 30 TABLET ORAL at 22:19

## 2025-01-01 RX ADMIN — SODIUM HYPOCHLORITE 1 APPLICATION(S): 0.12 SOLUTION TOPICAL at 20:59

## 2025-01-01 RX ADMIN — Medication 81 MILLIGRAM(S): at 12:21

## 2025-01-01 RX ADMIN — MUPIROCIN CALCIUM 1 APPLICATION(S): 20 CREAM TOPICAL at 16:04

## 2025-01-01 RX ADMIN — ATORVASTATIN CALCIUM 40 MILLIGRAM(S): 80 TABLET, FILM COATED ORAL at 21:19

## 2025-01-01 RX ADMIN — OXYCODONE HYDROCHLORIDE 10 MILLIGRAM(S): 30 TABLET ORAL at 14:10

## 2025-01-01 RX ADMIN — Medication 2 MILLIGRAM(S): at 22:07

## 2025-01-01 RX ADMIN — CLOPIDOGREL BISULFATE 75 MILLIGRAM(S): 75 TABLET, FILM COATED ORAL at 11:26

## 2025-01-01 RX ADMIN — OXYCODONE HYDROCHLORIDE 30 MILLIGRAM(S): 30 TABLET ORAL at 18:09

## 2025-01-01 RX ADMIN — HYDROXYZINE HYDROCHLORIDE 25 MILLIGRAM(S): 25 TABLET, FILM COATED ORAL at 23:27

## 2025-01-01 RX ADMIN — EPOETIN ALFA 10000 UNIT(S): 10000 SOLUTION INTRAVENOUS; SUBCUTANEOUS at 09:17

## 2025-01-01 RX ADMIN — CLOPIDOGREL BISULFATE 75 MILLIGRAM(S): 75 TABLET, FILM COATED ORAL at 11:37

## 2025-01-01 RX ADMIN — HEPARIN SODIUM 5000 UNIT(S): 1000 INJECTION INTRAVENOUS; SUBCUTANEOUS at 13:13

## 2025-01-01 RX ADMIN — Medication 1 MILLIGRAM(S): at 21:54

## 2025-01-01 RX ADMIN — Medication 40 MILLIGRAM(S): at 05:56

## 2025-01-01 RX ADMIN — IPRATROPIUM BROMIDE AND ALBUTEROL SULFATE 3 MILLILITER(S): .5; 2.5 SOLUTION RESPIRATORY (INHALATION) at 17:29

## 2025-01-01 RX ADMIN — HYDROXYZINE HYDROCHLORIDE 25 MILLIGRAM(S): 25 TABLET, FILM COATED ORAL at 05:37

## 2025-01-01 RX ADMIN — DEXTROMETHORPHAN HBR, GUAIFENESIN 1200 MILLIGRAM(S): 200 LIQUID ORAL at 17:04

## 2025-01-01 RX ADMIN — Medication 2 MILLIGRAM(S): at 16:30

## 2025-01-01 RX ADMIN — Medication 1 MILLIGRAM(S): at 16:15

## 2025-01-01 RX ADMIN — HYDROXYZINE HYDROCHLORIDE 25 MILLIGRAM(S): 25 TABLET, FILM COATED ORAL at 13:13

## 2025-01-01 RX ADMIN — HYDROXYZINE HYDROCHLORIDE 25 MILLIGRAM(S): 25 TABLET, FILM COATED ORAL at 04:52

## 2025-01-01 RX ADMIN — Medication 40 MILLIGRAM(S): at 05:44

## 2025-01-01 RX ADMIN — OXYCODONE HYDROCHLORIDE 10 MILLIGRAM(S): 30 TABLET ORAL at 13:26

## 2025-01-01 RX ADMIN — HYDROXYZINE HYDROCHLORIDE 25 MILLIGRAM(S): 25 TABLET, FILM COATED ORAL at 21:18

## 2025-01-01 RX ADMIN — Medication 2 MILLIGRAM(S): at 15:06

## 2025-01-01 RX ADMIN — OXYCODONE HYDROCHLORIDE 30 MILLIGRAM(S): 30 TABLET ORAL at 17:21

## 2025-01-01 RX ADMIN — Medication 81 MILLIGRAM(S): at 12:00

## 2025-01-01 RX ADMIN — HYDROXYZINE HYDROCHLORIDE 25 MILLIGRAM(S): 25 TABLET, FILM COATED ORAL at 12:13

## 2025-01-01 RX ADMIN — Medication 400 MILLIGRAM(S): at 18:38

## 2025-01-01 RX ADMIN — Medication 400 MILLIGRAM(S): at 05:44

## 2025-01-01 RX ADMIN — Medication 40 MILLIGRAM(S): at 05:14

## 2025-01-01 RX ADMIN — PREGABALIN 25 MILLIGRAM(S): 75 CAPSULE ORAL at 06:50

## 2025-01-01 RX ADMIN — MUPIROCIN CALCIUM 1 APPLICATION(S): 20 CREAM TOPICAL at 08:14

## 2025-01-01 RX ADMIN — SODIUM HYPOCHLORITE 1 APPLICATION(S): 0.12 SOLUTION TOPICAL at 16:38

## 2025-01-01 RX ADMIN — OXYCODONE HYDROCHLORIDE 30 MILLIGRAM(S): 30 TABLET ORAL at 05:34

## 2025-01-01 RX ADMIN — HYDROXYZINE HYDROCHLORIDE 25 MILLIGRAM(S): 25 TABLET, FILM COATED ORAL at 05:03

## 2025-01-01 RX ADMIN — EPOETIN ALFA 10000 UNIT(S): 10000 SOLUTION INTRAVENOUS; SUBCUTANEOUS at 08:52

## 2025-01-01 RX ADMIN — OXYCODONE HYDROCHLORIDE 10 MILLIGRAM(S): 30 TABLET ORAL at 21:21

## 2025-01-01 RX ADMIN — Medication 400 MILLIGRAM(S): at 05:45

## 2025-01-01 RX ADMIN — ATORVASTATIN CALCIUM 40 MILLIGRAM(S): 80 TABLET, FILM COATED ORAL at 21:09

## 2025-01-01 RX ADMIN — IPRATROPIUM BROMIDE AND ALBUTEROL SULFATE 3 MILLILITER(S): .5; 2.5 SOLUTION RESPIRATORY (INHALATION) at 17:45

## 2025-01-01 RX ADMIN — CLOPIDOGREL BISULFATE 75 MILLIGRAM(S): 75 TABLET, FILM COATED ORAL at 15:47

## 2025-01-01 RX ADMIN — IPRATROPIUM BROMIDE AND ALBUTEROL SULFATE 3 MILLILITER(S): .5; 2.5 SOLUTION RESPIRATORY (INHALATION) at 06:23

## 2025-01-01 RX ADMIN — Medication 2 MILLIGRAM(S): at 04:29

## 2025-01-01 RX ADMIN — Medication 400 MILLIGRAM(S): at 05:18

## 2025-01-01 RX ADMIN — IPRATROPIUM BROMIDE AND ALBUTEROL SULFATE 3 MILLILITER(S): .5; 2.5 SOLUTION RESPIRATORY (INHALATION) at 17:07

## 2025-01-01 RX ADMIN — Medication 1 APPLICATION(S): at 05:42

## 2025-01-01 RX ADMIN — OXYCODONE HYDROCHLORIDE 10 MILLIGRAM(S): 30 TABLET ORAL at 16:06

## 2025-01-01 RX ADMIN — MEROPENEM 100 MILLIGRAM(S): 1 INJECTION INTRAVENOUS at 18:08

## 2025-01-01 RX ADMIN — IPRATROPIUM BROMIDE AND ALBUTEROL SULFATE 3 MILLILITER(S): .5; 2.5 SOLUTION RESPIRATORY (INHALATION) at 12:10

## 2025-01-01 RX ADMIN — Medication 2 MILLIGRAM(S): at 21:45

## 2025-01-01 RX ADMIN — HEPARIN SODIUM 1300 UNIT(S)/HR: 1000 INJECTION INTRAVENOUS; SUBCUTANEOUS at 18:34

## 2025-01-01 RX ADMIN — Medication 400 MILLIGRAM(S): at 05:13

## 2025-01-01 RX ADMIN — HYDROXYZINE HYDROCHLORIDE 25 MILLIGRAM(S): 25 TABLET, FILM COATED ORAL at 22:40

## 2025-01-01 RX ADMIN — OXYCODONE HYDROCHLORIDE 10 MILLIGRAM(S): 30 TABLET ORAL at 23:33

## 2025-01-01 RX ADMIN — MEROPENEM 100 MILLIGRAM(S): 1 INJECTION INTRAVENOUS at 05:16

## 2025-01-01 RX ADMIN — MUPIROCIN CALCIUM 1 APPLICATION(S): 20 CREAM TOPICAL at 08:59

## 2025-01-01 RX ADMIN — Medication 1 MILLIGRAM(S): at 15:22

## 2025-01-01 RX ADMIN — Medication 1334 MILLIGRAM(S): at 07:52

## 2025-01-01 RX ADMIN — OXYCODONE HYDROCHLORIDE 10 MILLIGRAM(S): 30 TABLET ORAL at 08:31

## 2025-01-01 RX ADMIN — OXYCODONE HYDROCHLORIDE 30 MILLIGRAM(S): 30 TABLET ORAL at 17:07

## 2025-01-01 RX ADMIN — Medication 1950 MILLIGRAM(S): at 23:11

## 2025-01-01 RX ADMIN — Medication 40 MILLIGRAM(S): at 05:04

## 2025-01-01 RX ADMIN — DEXTROMETHORPHAN HBR, GUAIFENESIN 1200 MILLIGRAM(S): 200 LIQUID ORAL at 17:08

## 2025-01-01 RX ADMIN — Medication 4 MILLIGRAM(S): at 09:32

## 2025-01-01 RX ADMIN — DEXTROMETHORPHAN HBR, GUAIFENESIN 1200 MILLIGRAM(S): 200 LIQUID ORAL at 05:13

## 2025-01-01 RX ADMIN — Medication 2 MILLIGRAM(S): at 03:25

## 2025-01-01 RX ADMIN — POLYETHYLENE GLYCOL 3350 17 GRAM(S): 17 POWDER, FOR SOLUTION ORAL at 13:18

## 2025-01-01 RX ADMIN — Medication 81 MILLIGRAM(S): at 11:56

## 2025-01-01 RX ADMIN — POLYETHYLENE GLYCOL 3350 17 GRAM(S): 17 POWDER, FOR SOLUTION ORAL at 15:48

## 2025-01-01 RX ADMIN — METHOCARBAMOL 750 MILLIGRAM(S): 500 TABLET, FILM COATED ORAL at 21:43

## 2025-01-01 RX ADMIN — POLYETHYLENE GLYCOL 3350 17 GRAM(S): 17 POWDER, FOR SOLUTION ORAL at 11:49

## 2025-01-01 RX ADMIN — OXYCODONE HYDROCHLORIDE 5 MILLIGRAM(S): 30 TABLET ORAL at 23:10

## 2025-01-01 RX ADMIN — MUPIROCIN CALCIUM 1 APPLICATION(S): 20 CREAM TOPICAL at 12:22

## 2025-01-01 RX ADMIN — Medication 2 MILLIGRAM(S): at 22:13

## 2025-01-01 RX ADMIN — Medication 81 MILLIGRAM(S): at 11:26

## 2025-01-01 RX ADMIN — Medication 400 MILLIGRAM(S): at 05:57

## 2025-01-01 RX ADMIN — Medication 4 MILLIGRAM(S): at 14:53

## 2025-01-01 RX ADMIN — Medication 1 APPLICATION(S): at 17:02

## 2025-01-01 RX ADMIN — HYDROXYZINE HYDROCHLORIDE 25 MILLIGRAM(S): 25 TABLET, FILM COATED ORAL at 11:47

## 2025-01-01 RX ADMIN — OXYCODONE HYDROCHLORIDE 20 MILLIGRAM(S): 30 TABLET ORAL at 22:00

## 2025-01-01 RX ADMIN — HYDROXYZINE HYDROCHLORIDE 25 MILLIGRAM(S): 25 TABLET, FILM COATED ORAL at 21:26

## 2025-01-01 RX ADMIN — HEPARIN SODIUM 5000 UNIT(S): 1000 INJECTION INTRAVENOUS; SUBCUTANEOUS at 13:14

## 2025-01-01 RX ADMIN — Medication 1 TABLET(S): at 17:12

## 2025-01-01 RX ADMIN — Medication 2 MILLIGRAM(S): at 22:19

## 2025-01-01 RX ADMIN — Medication 2 MILLIGRAM(S): at 09:44

## 2025-01-01 RX ADMIN — SODIUM CHLORIDE 500 MILLILITER(S): 9 INJECTION, SOLUTION INTRAVENOUS at 00:19

## 2025-01-01 RX ADMIN — Medication 400 MILLIGRAM(S): at 09:36

## 2025-01-01 RX ADMIN — Medication 2 TABLET(S): at 21:29

## 2025-01-01 RX ADMIN — Medication 2 TABLET(S): at 21:26

## 2025-01-01 RX ADMIN — Medication 81 MILLIGRAM(S): at 12:25

## 2025-01-01 RX ADMIN — HEPARIN SODIUM 5000 UNIT(S): 1000 INJECTION INTRAVENOUS; SUBCUTANEOUS at 05:30

## 2025-01-01 RX ADMIN — MEROPENEM 100 MILLIGRAM(S): 1 INJECTION INTRAVENOUS at 05:28

## 2025-01-01 RX ADMIN — Medication 2.5 MILLIGRAM(S): at 22:59

## 2025-01-01 RX ADMIN — OXYCODONE HYDROCHLORIDE 30 MILLIGRAM(S): 30 TABLET ORAL at 05:36

## 2025-01-01 RX ADMIN — OXYCODONE HYDROCHLORIDE 10 MILLIGRAM(S): 30 TABLET ORAL at 11:28

## 2025-01-01 RX ADMIN — Medication 81 MILLIGRAM(S): at 14:35

## 2025-01-01 RX ADMIN — MEROPENEM 100 MILLIGRAM(S): 1 INJECTION INTRAVENOUS at 17:41

## 2025-01-01 RX ADMIN — Medication 1 TABLET(S): at 12:21

## 2025-01-01 RX ADMIN — Medication 400 MILLIGRAM(S): at 18:28

## 2025-01-01 RX ADMIN — OXYCODONE HYDROCHLORIDE 10 MILLIGRAM(S): 30 TABLET ORAL at 15:03

## 2025-01-01 RX ADMIN — OXYCODONE HYDROCHLORIDE 30 MILLIGRAM(S): 30 TABLET ORAL at 05:15

## 2025-01-01 RX ADMIN — HEPARIN SODIUM 5000 UNIT(S): 1000 INJECTION INTRAVENOUS; SUBCUTANEOUS at 16:08

## 2025-01-01 RX ADMIN — Medication 2 TABLET(S): at 21:43

## 2025-01-01 RX ADMIN — IPRATROPIUM BROMIDE AND ALBUTEROL SULFATE 3 MILLILITER(S): .5; 2.5 SOLUTION RESPIRATORY (INHALATION) at 17:31

## 2025-01-01 RX ADMIN — IPRATROPIUM BROMIDE AND ALBUTEROL SULFATE 3 MILLILITER(S): .5; 2.5 SOLUTION RESPIRATORY (INHALATION) at 23:54

## 2025-01-01 RX ADMIN — GABAPENTIN 300 MILLIGRAM(S): 400 CAPSULE ORAL at 22:45

## 2025-01-01 RX ADMIN — Medication 1 TABLET(S): at 08:51

## 2025-01-01 RX ADMIN — HYDROXYZINE HYDROCHLORIDE 25 MILLIGRAM(S): 25 TABLET, FILM COATED ORAL at 21:21

## 2025-01-01 RX ADMIN — Medication 1 APPLICATION(S): at 12:19

## 2025-01-01 RX ADMIN — MUPIROCIN CALCIUM 1 APPLICATION(S): 20 CREAM TOPICAL at 08:55

## 2025-01-01 RX ADMIN — Medication 1 APPLICATION(S): at 17:32

## 2025-01-01 RX ADMIN — Medication 40 MILLIGRAM(S): at 06:41

## 2025-01-01 RX ADMIN — Medication 81 MILLIGRAM(S): at 11:12

## 2025-01-01 RX ADMIN — OXYCODONE HYDROCHLORIDE 20 MILLIGRAM(S): 30 TABLET ORAL at 14:20

## 2025-01-01 RX ADMIN — Medication 0.5 MILLIGRAM(S): at 22:57

## 2025-01-01 RX ADMIN — Medication 1 APPLICATION(S): at 12:37

## 2025-01-01 RX ADMIN — BACITRACIN ZINC AND POLYMYXIN B SULFATE 1 APPLICATION(S): 500; 10000 OINTMENT TOPICAL at 06:33

## 2025-01-01 RX ADMIN — Medication 1334 MILLIGRAM(S): at 08:13

## 2025-01-01 RX ADMIN — OXYCODONE HYDROCHLORIDE 10 MILLIGRAM(S): 30 TABLET ORAL at 15:13

## 2025-01-01 RX ADMIN — Medication 1 TABLET(S): at 08:12

## 2025-01-01 RX ADMIN — Medication 2 MILLIGRAM(S): at 02:13

## 2025-01-01 RX ADMIN — Medication 40 MILLIGRAM(S): at 04:53

## 2025-01-01 RX ADMIN — Medication 1 TABLET(S): at 18:23

## 2025-01-01 RX ADMIN — Medication 400 MILLIGRAM(S): at 16:32

## 2025-01-01 RX ADMIN — Medication 2 MILLIGRAM(S): at 02:14

## 2025-01-01 RX ADMIN — IPRATROPIUM BROMIDE AND ALBUTEROL SULFATE 3 MILLILITER(S): .5; 2.5 SOLUTION RESPIRATORY (INHALATION) at 11:26

## 2025-01-01 RX ADMIN — HYDROXYZINE HYDROCHLORIDE 25 MILLIGRAM(S): 25 TABLET, FILM COATED ORAL at 13:33

## 2025-01-01 RX ADMIN — OXYCODONE HYDROCHLORIDE 10 MILLIGRAM(S): 30 TABLET ORAL at 22:30

## 2025-01-01 RX ADMIN — Medication 40 MILLIGRAM(S): at 06:25

## 2025-01-01 RX ADMIN — Medication 2001 MILLIGRAM(S): at 09:25

## 2025-01-01 RX ADMIN — Medication 1 TABLET(S): at 17:01

## 2025-01-01 RX ADMIN — Medication 1 TABLET(S): at 17:40

## 2025-01-01 RX ADMIN — OXYCODONE HYDROCHLORIDE 10 MILLIGRAM(S): 30 TABLET ORAL at 18:24

## 2025-01-01 RX ADMIN — Medication 2 MILLIGRAM(S): at 11:12

## 2025-01-01 RX ADMIN — Medication 400 MILLIGRAM(S): at 17:28

## 2025-01-01 RX ADMIN — METHOCARBAMOL 750 MILLIGRAM(S): 500 TABLET, FILM COATED ORAL at 05:03

## 2025-01-01 RX ADMIN — Medication 2 MILLIGRAM(S): at 03:15

## 2025-01-01 RX ADMIN — Medication 2 MILLIGRAM(S): at 21:12

## 2025-01-01 RX ADMIN — Medication 1 TABLET(S): at 11:33

## 2025-01-01 RX ADMIN — Medication 0.5 MILLIGRAM(S): at 19:35

## 2025-01-01 RX ADMIN — Medication 2 MILLIGRAM(S): at 14:05

## 2025-01-01 RX ADMIN — Medication 63.75 MILLIMOLE(S): at 00:15

## 2025-01-01 RX ADMIN — Medication 2 MILLIGRAM(S): at 04:54

## 2025-01-01 RX ADMIN — HYDROXYZINE HYDROCHLORIDE 25 MILLIGRAM(S): 25 TABLET, FILM COATED ORAL at 15:49

## 2025-01-01 RX ADMIN — Medication 2 MILLIGRAM(S): at 21:03

## 2025-01-01 RX ADMIN — Medication 2001 MILLIGRAM(S): at 17:52

## 2025-01-01 RX ADMIN — Medication 400 MILLIGRAM(S): at 05:19

## 2025-01-01 RX ADMIN — Medication 2 MILLIGRAM(S): at 17:36

## 2025-01-01 RX ADMIN — HYDROXYZINE HYDROCHLORIDE 25 MILLIGRAM(S): 25 TABLET, FILM COATED ORAL at 13:36

## 2025-01-01 RX ADMIN — IPRATROPIUM BROMIDE AND ALBUTEROL SULFATE 3 MILLILITER(S): .5; 2.5 SOLUTION RESPIRATORY (INHALATION) at 00:31

## 2025-01-01 RX ADMIN — CLOPIDOGREL BISULFATE 75 MILLIGRAM(S): 75 TABLET, FILM COATED ORAL at 12:21

## 2025-01-01 RX ADMIN — MEROPENEM 100 MILLIGRAM(S): 1 INJECTION INTRAVENOUS at 18:51

## 2025-01-01 RX ADMIN — CLOPIDOGREL BISULFATE 75 MILLIGRAM(S): 75 TABLET, FILM COATED ORAL at 11:55

## 2025-01-01 RX ADMIN — BACITRACIN ZINC AND POLYMYXIN B SULFATE 1 APPLICATION(S): 500; 10000 OINTMENT TOPICAL at 18:30

## 2025-01-01 RX ADMIN — Medication 1 APPLICATION(S): at 17:29

## 2025-01-01 RX ADMIN — CLOPIDOGREL BISULFATE 75 MILLIGRAM(S): 75 TABLET, FILM COATED ORAL at 11:50

## 2025-01-01 RX ADMIN — DEXTROMETHORPHAN HBR, GUAIFENESIN 1200 MILLIGRAM(S): 200 LIQUID ORAL at 16:51

## 2025-01-01 RX ADMIN — Medication 4 MILLIGRAM(S): at 05:41

## 2025-01-01 RX ADMIN — OXYCODONE HYDROCHLORIDE 20 MILLIGRAM(S): 30 TABLET ORAL at 21:46

## 2025-01-01 RX ADMIN — KETOCONAZOLE 1 APPLICATION(S): 20 CREAM TOPICAL at 12:14

## 2025-01-01 RX ADMIN — OXYCODONE HYDROCHLORIDE 20 MILLIGRAM(S): 30 TABLET ORAL at 22:40

## 2025-01-01 RX ADMIN — OXYCODONE HYDROCHLORIDE 30 MILLIGRAM(S): 30 TABLET ORAL at 05:19

## 2025-01-01 RX ADMIN — HEPARIN SODIUM 1200 UNIT(S)/HR: 1000 INJECTION INTRAVENOUS; SUBCUTANEOUS at 00:00

## 2025-01-01 RX ADMIN — Medication 1 APPLICATION(S): at 17:27

## 2025-01-01 RX ADMIN — HYDROXYZINE HYDROCHLORIDE 25 MILLIGRAM(S): 25 TABLET, FILM COATED ORAL at 05:01

## 2025-01-01 RX ADMIN — OXYCODONE HYDROCHLORIDE 30 MILLIGRAM(S): 30 TABLET ORAL at 18:25

## 2025-01-01 RX ADMIN — METHOCARBAMOL 750 MILLIGRAM(S): 500 TABLET, FILM COATED ORAL at 05:55

## 2025-01-01 RX ADMIN — ACETYLCYSTEINE 4 MILLILITER(S): 200 INHALANT RESPIRATORY (INHALATION) at 11:07

## 2025-01-01 RX ADMIN — Medication 1 MILLIGRAM(S): at 13:03

## 2025-01-01 RX ADMIN — HYDROXYZINE HYDROCHLORIDE 25 MILLIGRAM(S): 25 TABLET, FILM COATED ORAL at 21:58

## 2025-01-01 RX ADMIN — OXYCODONE HYDROCHLORIDE 10 MILLIGRAM(S): 30 TABLET ORAL at 21:15

## 2025-01-01 RX ADMIN — Medication 2 MILLIGRAM(S): at 22:32

## 2025-01-01 RX ADMIN — Medication 1 APPLICATION(S): at 05:39

## 2025-01-01 RX ADMIN — OXYCODONE HYDROCHLORIDE 10 MILLIGRAM(S): 30 TABLET ORAL at 18:34

## 2025-01-01 RX ADMIN — Medication 2 MILLIGRAM(S): at 11:26

## 2025-01-01 RX ADMIN — Medication 40 MILLIGRAM(S): at 06:50

## 2025-01-01 RX ADMIN — OXYCODONE HYDROCHLORIDE 10 MILLIGRAM(S): 30 TABLET ORAL at 19:56

## 2025-01-01 RX ADMIN — OXYCODONE HYDROCHLORIDE 10 MILLIGRAM(S): 30 TABLET ORAL at 22:25

## 2025-01-01 RX ADMIN — Medication 1 MILLIGRAM(S): at 16:22

## 2025-01-01 RX ADMIN — Medication 2 MILLIGRAM(S): at 21:31

## 2025-01-01 RX ADMIN — IPRATROPIUM BROMIDE AND ALBUTEROL SULFATE 3 MILLILITER(S): .5; 2.5 SOLUTION RESPIRATORY (INHALATION) at 05:17

## 2025-01-01 RX ADMIN — Medication 1 APPLICATION(S): at 12:17

## 2025-01-01 RX ADMIN — DAPTOMYCIN 136 MILLIGRAM(S): 500 INJECTION, POWDER, LYOPHILIZED, FOR SOLUTION INTRAVENOUS at 18:04

## 2025-01-01 RX ADMIN — DEXTROMETHORPHAN HBR, GUAIFENESIN 1200 MILLIGRAM(S): 200 LIQUID ORAL at 17:38

## 2025-01-01 RX ADMIN — HEPARIN SODIUM 5000 UNIT(S): 1000 INJECTION INTRAVENOUS; SUBCUTANEOUS at 20:48

## 2025-01-01 RX ADMIN — HEPARIN SODIUM 5000 UNIT(S): 1000 INJECTION INTRAVENOUS; SUBCUTANEOUS at 06:24

## 2025-01-01 RX ADMIN — Medication 1 APPLICATION(S): at 17:57

## 2025-01-01 RX ADMIN — HYDROXYZINE HYDROCHLORIDE 25 MILLIGRAM(S): 25 TABLET, FILM COATED ORAL at 12:57

## 2025-01-01 RX ADMIN — Medication 1 MILLIGRAM(S): at 04:13

## 2025-01-01 RX ADMIN — Medication 25 GRAM(S): at 23:46

## 2025-01-01 RX ADMIN — SODIUM HYPOCHLORITE 1 APPLICATION(S): 0.12 SOLUTION TOPICAL at 05:24

## 2025-01-01 RX ADMIN — IPRATROPIUM BROMIDE AND ALBUTEROL SULFATE 3 MILLILITER(S): .5; 2.5 SOLUTION RESPIRATORY (INHALATION) at 17:02

## 2025-01-01 RX ADMIN — EPOETIN ALFA 10000 UNIT(S): 10000 SOLUTION INTRAVENOUS; SUBCUTANEOUS at 09:30

## 2025-01-01 RX ADMIN — SODIUM HYPOCHLORITE 1 APPLICATION(S): 0.12 SOLUTION TOPICAL at 07:04

## 2025-01-01 RX ADMIN — HEPARIN SODIUM 5000 UNIT(S): 1000 INJECTION INTRAVENOUS; SUBCUTANEOUS at 13:33

## 2025-01-01 RX ADMIN — SODIUM HYPOCHLORITE 1 APPLICATION(S): 0.12 SOLUTION TOPICAL at 23:38

## 2025-01-01 RX ADMIN — POLYETHYLENE GLYCOL 3350 17 GRAM(S): 17 POWDER, FOR SOLUTION ORAL at 12:05

## 2025-01-01 RX ADMIN — Medication 1 TABLET(S): at 11:12

## 2025-01-01 RX ADMIN — HYDROXYZINE HYDROCHLORIDE 25 MILLIGRAM(S): 25 TABLET, FILM COATED ORAL at 21:46

## 2025-01-01 RX ADMIN — OXYCODONE HYDROCHLORIDE 10 MILLIGRAM(S): 30 TABLET ORAL at 14:53

## 2025-01-01 RX ADMIN — OXYCODONE HYDROCHLORIDE 10 MILLIGRAM(S): 30 TABLET ORAL at 18:41

## 2025-01-01 RX ADMIN — Medication 400 MILLIGRAM(S): at 17:59

## 2025-01-01 RX ADMIN — Medication 81 MILLIGRAM(S): at 12:06

## 2025-01-01 RX ADMIN — Medication 1 MILLIGRAM(S): at 14:13

## 2025-01-01 RX ADMIN — Medication 400 MILLIGRAM(S): at 17:44

## 2025-01-01 RX ADMIN — Medication 1 MILLIGRAM(S): at 12:46

## 2025-01-01 RX ADMIN — MIDODRINE HYDROCHLORIDE 10 MILLIGRAM(S): 5 TABLET ORAL at 04:59

## 2025-01-01 RX ADMIN — Medication 2 MILLIGRAM(S): at 11:54

## 2025-01-01 RX ADMIN — Medication 400 MILLIGRAM(S): at 06:06

## 2025-01-01 RX ADMIN — DEXTROMETHORPHAN HBR, GUAIFENESIN 1200 MILLIGRAM(S): 200 LIQUID ORAL at 04:49

## 2025-01-01 RX ADMIN — HYDROXYZINE HYDROCHLORIDE 25 MILLIGRAM(S): 25 TABLET, FILM COATED ORAL at 23:52

## 2025-01-01 RX ADMIN — Medication 400 MILLIGRAM(S): at 18:41

## 2025-01-01 RX ADMIN — Medication 1 APPLICATION(S): at 14:36

## 2025-01-01 RX ADMIN — Medication 2.5 MILLIGRAM(S): at 22:34

## 2025-01-01 RX ADMIN — Medication 400 MILLIGRAM(S): at 06:17

## 2025-01-01 RX ADMIN — Medication 81 MILLIGRAM(S): at 11:18

## 2025-01-01 RX ADMIN — Medication 40 MILLIGRAM(S): at 05:01

## 2025-01-01 RX ADMIN — Medication 80 MILLIGRAM(S): at 12:00

## 2025-01-01 RX ADMIN — METHOCARBAMOL 750 MILLIGRAM(S): 500 TABLET, FILM COATED ORAL at 13:13

## 2025-01-01 RX ADMIN — HYDROXYZINE HYDROCHLORIDE 25 MILLIGRAM(S): 25 TABLET, FILM COATED ORAL at 05:52

## 2025-01-01 RX ADMIN — Medication 2 TABLET(S): at 21:38

## 2025-01-01 RX ADMIN — ATORVASTATIN CALCIUM 40 MILLIGRAM(S): 80 TABLET, FILM COATED ORAL at 21:26

## 2025-01-01 RX ADMIN — DEXTROMETHORPHAN HBR, GUAIFENESIN 1200 MILLIGRAM(S): 200 LIQUID ORAL at 18:33

## 2025-01-01 RX ADMIN — CLOPIDOGREL BISULFATE 75 MILLIGRAM(S): 75 TABLET, FILM COATED ORAL at 12:07

## 2025-01-01 RX ADMIN — Medication 1 MILLIGRAM(S): at 03:08

## 2025-01-01 RX ADMIN — OXYCODONE HYDROCHLORIDE 20 MILLIGRAM(S): 30 TABLET ORAL at 06:24

## 2025-01-01 RX ADMIN — Medication 2001 MILLIGRAM(S): at 17:13

## 2025-01-01 RX ADMIN — Medication 1 APPLICATION(S): at 16:07

## 2025-01-01 RX ADMIN — Medication 400 MILLIGRAM(S): at 15:45

## 2025-01-01 RX ADMIN — Medication 2 MILLIGRAM(S): at 17:11

## 2025-01-01 RX ADMIN — HEPARIN SODIUM 5000 UNIT(S): 1000 INJECTION INTRAVENOUS; SUBCUTANEOUS at 14:39

## 2025-01-01 RX ADMIN — Medication 1 TABLET(S): at 08:41

## 2025-01-01 RX ADMIN — HEPARIN SODIUM 5000 UNIT(S): 1000 INJECTION INTRAVENOUS; SUBCUTANEOUS at 06:41

## 2025-01-01 RX ADMIN — Medication 40 MILLIGRAM(S): at 05:45

## 2025-01-01 RX ADMIN — Medication 1 APPLICATION(S): at 05:04

## 2025-01-01 RX ADMIN — EPOETIN ALFA 10000 UNIT(S): 10000 SOLUTION INTRAVENOUS; SUBCUTANEOUS at 08:51

## 2025-01-01 RX ADMIN — Medication 40 MILLIGRAM(S): at 06:38

## 2025-01-01 RX ADMIN — HYDROXYZINE HYDROCHLORIDE 25 MILLIGRAM(S): 25 TABLET, FILM COATED ORAL at 14:05

## 2025-01-01 RX ADMIN — Medication 25 GRAM(S): at 17:02

## 2025-01-01 RX ADMIN — Medication 2 MILLIGRAM(S): at 16:34

## 2025-01-01 RX ADMIN — Medication 40 MILLIGRAM(S): at 06:05

## 2025-01-01 RX ADMIN — Medication 400 MILLIGRAM(S): at 18:40

## 2025-01-01 RX ADMIN — SODIUM ZIRCONIUM CYCLOSILICATE 10 GRAM(S): 5 POWDER, FOR SUSPENSION ORAL at 12:09

## 2025-01-01 RX ADMIN — Medication 400 MILLIGRAM(S): at 17:01

## 2025-01-01 RX ADMIN — MEROPENEM 100 MILLIGRAM(S): 1 INJECTION INTRAVENOUS at 17:31

## 2025-01-01 RX ADMIN — Medication 40 MILLIGRAM(S): at 05:29

## 2025-01-01 RX ADMIN — Medication 2 MILLIGRAM(S): at 13:58

## 2025-01-01 RX ADMIN — Medication 1 TABLET(S): at 12:35

## 2025-01-01 RX ADMIN — Medication 1 APPLICATION(S): at 12:06

## 2025-01-01 RX ADMIN — Medication 4 MILLIGRAM(S): at 13:21

## 2025-01-01 RX ADMIN — Medication 400 MILLIGRAM(S): at 19:00

## 2025-01-01 RX ADMIN — DEXTROMETHORPHAN HBR, GUAIFENESIN 1200 MILLIGRAM(S): 200 LIQUID ORAL at 06:03

## 2025-01-01 RX ADMIN — MEROPENEM 100 MILLIGRAM(S): 1 INJECTION INTRAVENOUS at 21:13

## 2025-01-01 RX ADMIN — ATORVASTATIN CALCIUM 40 MILLIGRAM(S): 80 TABLET, FILM COATED ORAL at 20:48

## 2025-01-01 RX ADMIN — Medication 2 MILLIGRAM(S): at 11:33

## 2025-01-01 RX ADMIN — EPOETIN ALFA 10000 UNIT(S): 10000 SOLUTION INTRAVENOUS; SUBCUTANEOUS at 12:46

## 2025-01-01 RX ADMIN — Medication 400 MILLIGRAM(S): at 17:43

## 2025-01-01 RX ADMIN — OXYCODONE HYDROCHLORIDE 30 MILLIGRAM(S): 30 TABLET ORAL at 06:04

## 2025-01-01 RX ADMIN — OXYCODONE HYDROCHLORIDE 30 MILLIGRAM(S): 30 TABLET ORAL at 18:56

## 2025-01-01 RX ADMIN — ATORVASTATIN CALCIUM 40 MILLIGRAM(S): 80 TABLET, FILM COATED ORAL at 21:07

## 2025-01-01 RX ADMIN — BACITRACIN ZINC AND POLYMYXIN B SULFATE 1 APPLICATION(S): 500; 10000 OINTMENT TOPICAL at 05:25

## 2025-01-01 RX ADMIN — Medication 81 MILLIGRAM(S): at 11:37

## 2025-01-01 RX ADMIN — Medication 81 MILLIGRAM(S): at 18:25

## 2025-01-01 RX ADMIN — OXYCODONE HYDROCHLORIDE 10 MILLIGRAM(S): 30 TABLET ORAL at 23:20

## 2025-01-01 RX ADMIN — OXYCODONE HYDROCHLORIDE 30 MILLIGRAM(S): 30 TABLET ORAL at 06:00

## 2025-01-01 RX ADMIN — OXYCODONE HYDROCHLORIDE 30 MILLIGRAM(S): 30 TABLET ORAL at 18:45

## 2025-01-01 RX ADMIN — Medication 2 TABLET(S): at 21:14

## 2025-01-01 RX ADMIN — OXYCODONE HYDROCHLORIDE 20 MILLIGRAM(S): 30 TABLET ORAL at 23:45

## 2025-01-01 RX ADMIN — OXYCODONE HYDROCHLORIDE 10 MILLIGRAM(S): 30 TABLET ORAL at 23:25

## 2025-01-01 RX ADMIN — HYDROXYZINE HYDROCHLORIDE 25 MILLIGRAM(S): 25 TABLET, FILM COATED ORAL at 22:19

## 2025-01-01 RX ADMIN — Medication 2 MILLIGRAM(S): at 15:37

## 2025-01-01 RX ADMIN — EPOETIN ALFA 10000 UNIT(S): 10000 SOLUTION INTRAVENOUS; SUBCUTANEOUS at 10:05

## 2025-01-01 RX ADMIN — OXYCODONE HYDROCHLORIDE 10 MILLIGRAM(S): 30 TABLET ORAL at 10:17

## 2025-01-01 RX ADMIN — Medication 1 TABLET(S): at 17:34

## 2025-01-01 RX ADMIN — HYDROXYZINE HYDROCHLORIDE 25 MILLIGRAM(S): 25 TABLET, FILM COATED ORAL at 16:50

## 2025-01-01 RX ADMIN — Medication 2 MILLIGRAM(S): at 21:08

## 2025-01-01 RX ADMIN — PREGABALIN 25 MILLIGRAM(S): 75 CAPSULE ORAL at 05:25

## 2025-01-01 RX ADMIN — Medication 1 APPLICATION(S): at 05:20

## 2025-01-01 RX ADMIN — Medication 1 APPLICATION(S): at 12:00

## 2025-01-01 RX ADMIN — DEXTROMETHORPHAN HBR, GUAIFENESIN 1200 MILLIGRAM(S): 200 LIQUID ORAL at 05:43

## 2025-01-01 RX ADMIN — Medication 2001 MILLIGRAM(S): at 09:24

## 2025-01-01 RX ADMIN — OXYCODONE HYDROCHLORIDE 10 MILLIGRAM(S): 30 TABLET ORAL at 16:28

## 2025-01-01 RX ADMIN — Medication 400 MILLIGRAM(S): at 17:51

## 2025-01-01 RX ADMIN — BACITRACIN ZINC AND POLYMYXIN B SULFATE 1 APPLICATION(S): 500; 10000 OINTMENT TOPICAL at 05:05

## 2025-01-01 RX ADMIN — HYDROXYZINE HYDROCHLORIDE 25 MILLIGRAM(S): 25 TABLET, FILM COATED ORAL at 05:20

## 2025-01-01 RX ADMIN — OXYCODONE HYDROCHLORIDE 10 MILLIGRAM(S): 30 TABLET ORAL at 16:29

## 2025-01-01 RX ADMIN — HEPARIN SODIUM 1300 UNIT(S)/HR: 1000 INJECTION INTRAVENOUS; SUBCUTANEOUS at 22:22

## 2025-01-01 RX ADMIN — Medication 975 MILLIGRAM(S): at 16:20

## 2025-01-01 RX ADMIN — HYDROXYZINE HYDROCHLORIDE 25 MILLIGRAM(S): 25 TABLET, FILM COATED ORAL at 15:39

## 2025-01-01 RX ADMIN — Medication 2 MILLIGRAM(S): at 08:54

## 2025-01-01 RX ADMIN — OXYCODONE HYDROCHLORIDE 10 MILLIGRAM(S): 30 TABLET ORAL at 16:35

## 2025-01-01 RX ADMIN — OXYCODONE HYDROCHLORIDE 5 MILLIGRAM(S): 30 TABLET ORAL at 21:23

## 2025-01-01 RX ADMIN — LACTULOSE 10 GRAM(S): 10 SOLUTION ORAL at 17:05

## 2025-01-01 RX ADMIN — Medication 1 TABLET(S): at 10:27

## 2025-01-01 RX ADMIN — OXYCODONE HYDROCHLORIDE 30 MILLIGRAM(S): 30 TABLET ORAL at 17:04

## 2025-01-01 RX ADMIN — DEXTROMETHORPHAN HBR, GUAIFENESIN 1200 MILLIGRAM(S): 200 LIQUID ORAL at 18:14

## 2025-01-01 RX ADMIN — Medication 5 MILLIGRAM(S): at 11:18

## 2025-01-01 RX ADMIN — DEXTROMETHORPHAN HBR, GUAIFENESIN 1200 MILLIGRAM(S): 200 LIQUID ORAL at 05:40

## 2025-01-01 RX ADMIN — OXYCODONE HYDROCHLORIDE 10 MILLIGRAM(S): 30 TABLET ORAL at 01:15

## 2025-01-01 RX ADMIN — HEPARIN SODIUM 5000 UNIT(S): 1000 INJECTION INTRAVENOUS; SUBCUTANEOUS at 05:55

## 2025-01-01 RX ADMIN — ATORVASTATIN CALCIUM 40 MILLIGRAM(S): 80 TABLET, FILM COATED ORAL at 21:08

## 2025-01-01 RX ADMIN — Medication 2001 MILLIGRAM(S): at 14:12

## 2025-01-01 RX ADMIN — ATORVASTATIN CALCIUM 40 MILLIGRAM(S): 80 TABLET, FILM COATED ORAL at 22:40

## 2025-01-01 RX ADMIN — Medication 1 MILLIGRAM(S): at 14:39

## 2025-01-01 RX ADMIN — IPRATROPIUM BROMIDE AND ALBUTEROL SULFATE 3 MILLILITER(S): .5; 2.5 SOLUTION RESPIRATORY (INHALATION) at 06:03

## 2025-01-01 RX ADMIN — HYDROXYZINE HYDROCHLORIDE 25 MILLIGRAM(S): 25 TABLET, FILM COATED ORAL at 21:32

## 2025-01-01 RX ADMIN — OXYCODONE HYDROCHLORIDE 30 MILLIGRAM(S): 30 TABLET ORAL at 17:05

## 2025-01-01 RX ADMIN — BACITRACIN ZINC AND POLYMYXIN B SULFATE 1 APPLICATION(S): 500; 10000 OINTMENT TOPICAL at 21:06

## 2025-01-01 RX ADMIN — Medication 25 GRAM(S): at 21:13

## 2025-01-01 RX ADMIN — Medication 40 MILLIGRAM(S): at 05:19

## 2025-01-01 RX ADMIN — ATORVASTATIN CALCIUM 40 MILLIGRAM(S): 80 TABLET, FILM COATED ORAL at 21:12

## 2025-01-01 RX ADMIN — OXYCODONE HYDROCHLORIDE 30 MILLIGRAM(S): 30 TABLET ORAL at 04:53

## 2025-01-01 RX ADMIN — ATORVASTATIN CALCIUM 40 MILLIGRAM(S): 80 TABLET, FILM COATED ORAL at 21:35

## 2025-01-01 RX ADMIN — ATORVASTATIN CALCIUM 40 MILLIGRAM(S): 80 TABLET, FILM COATED ORAL at 22:03

## 2025-01-01 RX ADMIN — Medication 0.5 MILLIGRAM(S): at 09:10

## 2025-01-01 RX ADMIN — Medication 2 MILLIGRAM(S): at 23:26

## 2025-01-01 RX ADMIN — Medication 2 MILLIGRAM(S): at 16:29

## 2025-01-01 RX ADMIN — CLOPIDOGREL BISULFATE 75 MILLIGRAM(S): 75 TABLET, FILM COATED ORAL at 11:18

## 2025-01-01 RX ADMIN — DEXTROMETHORPHAN HBR, GUAIFENESIN 1200 MILLIGRAM(S): 200 LIQUID ORAL at 05:36

## 2025-01-01 RX ADMIN — MUPIROCIN CALCIUM 1 APPLICATION(S): 20 CREAM TOPICAL at 10:33

## 2025-01-01 RX ADMIN — SODIUM HYPOCHLORITE 1 APPLICATION(S): 0.12 SOLUTION TOPICAL at 23:26

## 2025-01-01 RX ADMIN — Medication 2 MILLIGRAM(S): at 07:38

## 2025-01-01 RX ADMIN — HYDROXYZINE HYDROCHLORIDE 25 MILLIGRAM(S): 25 TABLET, FILM COATED ORAL at 16:04

## 2025-01-01 RX ADMIN — HEPARIN SODIUM 1300 UNIT(S)/HR: 1000 INJECTION INTRAVENOUS; SUBCUTANEOUS at 14:24

## 2025-01-01 RX ADMIN — HYDROXYZINE HYDROCHLORIDE 25 MILLIGRAM(S): 25 TABLET, FILM COATED ORAL at 06:42

## 2025-01-01 RX ADMIN — Medication 400 MILLIGRAM(S): at 04:49

## 2025-01-01 RX ADMIN — ACETYLCYSTEINE 4 MILLILITER(S): 200 INHALANT RESPIRATORY (INHALATION) at 17:09

## 2025-01-01 RX ADMIN — HYDROXYZINE HYDROCHLORIDE 25 MILLIGRAM(S): 25 TABLET, FILM COATED ORAL at 21:29

## 2025-01-01 RX ADMIN — Medication 2 MILLIGRAM(S): at 02:33

## 2025-01-01 RX ADMIN — Medication 2 MILLIGRAM(S): at 15:23

## 2025-01-01 RX ADMIN — HYDROXYZINE HYDROCHLORIDE 25 MILLIGRAM(S): 25 TABLET, FILM COATED ORAL at 05:55

## 2025-01-01 RX ADMIN — Medication 400 MILLIGRAM(S): at 06:09

## 2025-01-01 RX ADMIN — OXYCODONE HYDROCHLORIDE 10 MILLIGRAM(S): 30 TABLET ORAL at 23:19

## 2025-01-01 RX ADMIN — OXYCODONE HYDROCHLORIDE 30 MILLIGRAM(S): 30 TABLET ORAL at 05:57

## 2025-01-01 RX ADMIN — Medication 25 MILLIGRAM(S): at 17:10

## 2025-01-01 RX ADMIN — GABAPENTIN 300 MILLIGRAM(S): 400 CAPSULE ORAL at 21:39

## 2025-01-01 RX ADMIN — Medication 1 APPLICATION(S): at 17:28

## 2025-01-01 RX ADMIN — POLYETHYLENE GLYCOL 3350 17 GRAM(S): 17 POWDER, FOR SOLUTION ORAL at 12:10

## 2025-01-01 RX ADMIN — Medication 1 TABLET(S): at 17:26

## 2025-01-01 RX ADMIN — OXYCODONE HYDROCHLORIDE 20 MILLIGRAM(S): 30 TABLET ORAL at 05:03

## 2025-01-01 RX ADMIN — Medication 2 MILLIGRAM(S): at 08:22

## 2025-01-01 RX ADMIN — ATORVASTATIN CALCIUM 40 MILLIGRAM(S): 80 TABLET, FILM COATED ORAL at 21:05

## 2025-01-01 RX ADMIN — EPOETIN ALFA 10000 UNIT(S): 10000 SOLUTION INTRAVENOUS; SUBCUTANEOUS at 09:25

## 2025-01-01 RX ADMIN — DEXTROMETHORPHAN HBR, GUAIFENESIN 1200 MILLIGRAM(S): 200 LIQUID ORAL at 17:13

## 2025-01-01 RX ADMIN — OXYCODONE HYDROCHLORIDE 10 MILLIGRAM(S): 30 TABLET ORAL at 20:39

## 2025-01-01 RX ADMIN — Medication 400 MILLIGRAM(S): at 06:05

## 2025-01-01 RX ADMIN — Medication 1334 MILLIGRAM(S): at 16:52

## 2025-01-01 RX ADMIN — Medication 2001 MILLIGRAM(S): at 12:13

## 2025-01-01 RX ADMIN — GABAPENTIN 50 MILLIGRAM(S): 400 CAPSULE ORAL at 21:42

## 2025-01-01 RX ADMIN — SODIUM HYPOCHLORITE 1 APPLICATION(S): 0.12 SOLUTION TOPICAL at 21:29

## 2025-01-01 RX ADMIN — Medication 1 MILLIGRAM(S): at 22:54

## 2025-01-01 RX ADMIN — CLOPIDOGREL BISULFATE 75 MILLIGRAM(S): 75 TABLET, FILM COATED ORAL at 12:15

## 2025-01-01 RX ADMIN — Medication 400 MILLIGRAM(S): at 12:05

## 2025-01-01 RX ADMIN — DEXTROMETHORPHAN HBR, GUAIFENESIN 1200 MILLIGRAM(S): 200 LIQUID ORAL at 18:26

## 2025-01-01 RX ADMIN — ACETYLCYSTEINE 4 MILLILITER(S): 200 INHALANT RESPIRATORY (INHALATION) at 23:54

## 2025-01-01 RX ADMIN — Medication 400 MILLIGRAM(S): at 17:50

## 2025-01-01 RX ADMIN — Medication 1 APPLICATION(S): at 12:29

## 2025-01-01 RX ADMIN — HEPARIN SODIUM 5000 UNIT(S): 1000 INJECTION INTRAVENOUS; SUBCUTANEOUS at 05:33

## 2025-01-01 RX ADMIN — OXYCODONE HYDROCHLORIDE 30 MILLIGRAM(S): 30 TABLET ORAL at 17:09

## 2025-01-01 RX ADMIN — Medication 1 APPLICATION(S): at 11:32

## 2025-01-01 RX ADMIN — LIDOCAINE HYDROCHLORIDE 1 PATCH: 20 JELLY TOPICAL at 01:26

## 2025-01-01 RX ADMIN — Medication 4 MILLIGRAM(S): at 01:00

## 2025-01-01 RX ADMIN — Medication 2001 MILLIGRAM(S): at 12:10

## 2025-01-01 RX ADMIN — HYDROXYZINE HYDROCHLORIDE 25 MILLIGRAM(S): 25 TABLET, FILM COATED ORAL at 16:03

## 2025-01-01 RX ADMIN — Medication 400 MILLIGRAM(S): at 05:03

## 2025-01-01 RX ADMIN — OXYCODONE HYDROCHLORIDE 10 MILLIGRAM(S): 30 TABLET ORAL at 11:47

## 2025-01-01 RX ADMIN — Medication 25 GRAM(S): at 05:18

## 2025-01-01 RX ADMIN — SODIUM HYPOCHLORITE 1 APPLICATION(S): 0.12 SOLUTION TOPICAL at 14:24

## 2025-01-01 RX ADMIN — HYDROXYZINE HYDROCHLORIDE 25 MILLIGRAM(S): 25 TABLET, FILM COATED ORAL at 00:15

## 2025-01-01 RX ADMIN — Medication 1 APPLICATION(S): at 06:50

## 2025-01-01 RX ADMIN — HEPARIN SODIUM 5000 UNIT(S): 1000 INJECTION INTRAVENOUS; SUBCUTANEOUS at 05:51

## 2025-01-01 RX ADMIN — Medication 400 MILLIGRAM(S): at 05:30

## 2025-01-01 RX ADMIN — CLOPIDOGREL BISULFATE 75 MILLIGRAM(S): 75 TABLET, FILM COATED ORAL at 12:00

## 2025-01-01 RX ADMIN — OXYCODONE HYDROCHLORIDE 10 MILLIGRAM(S): 30 TABLET ORAL at 06:50

## 2025-01-01 RX ADMIN — HYDROXYZINE HYDROCHLORIDE 25 MILLIGRAM(S): 25 TABLET, FILM COATED ORAL at 13:27

## 2025-01-01 RX ADMIN — HYDROXYZINE HYDROCHLORIDE 25 MILLIGRAM(S): 25 TABLET, FILM COATED ORAL at 06:06

## 2025-01-01 RX ADMIN — HYDROXYZINE HYDROCHLORIDE 25 MILLIGRAM(S): 25 TABLET, FILM COATED ORAL at 13:15

## 2025-01-01 RX ADMIN — Medication 80 MILLIGRAM(S): at 21:07

## 2025-01-01 RX ADMIN — Medication 400 MILLIGRAM(S): at 21:45

## 2025-01-01 RX ADMIN — HEPARIN SODIUM 5000 UNIT(S): 1000 INJECTION INTRAVENOUS; SUBCUTANEOUS at 23:25

## 2025-01-01 RX ADMIN — HYDROXYZINE HYDROCHLORIDE 25 MILLIGRAM(S): 25 TABLET, FILM COATED ORAL at 06:19

## 2025-01-01 RX ADMIN — Medication 400 MILLIGRAM(S): at 17:58

## 2025-01-01 RX ADMIN — Medication 2 MILLIGRAM(S): at 13:35

## 2025-01-01 RX ADMIN — IPRATROPIUM BROMIDE AND ALBUTEROL SULFATE 3 MILLILITER(S): .5; 2.5 SOLUTION RESPIRATORY (INHALATION) at 05:15

## 2025-01-01 RX ADMIN — Medication 2 MILLIGRAM(S): at 17:59

## 2025-01-01 RX ADMIN — IPRATROPIUM BROMIDE AND ALBUTEROL SULFATE 3 MILLILITER(S): .5; 2.5 SOLUTION RESPIRATORY (INHALATION) at 11:04

## 2025-01-01 RX ADMIN — Medication 80 MILLIGRAM(S): at 14:36

## 2025-01-01 RX ADMIN — OXYCODONE HYDROCHLORIDE 30 MILLIGRAM(S): 30 TABLET ORAL at 18:12

## 2025-01-01 RX ADMIN — Medication 1 TABLET(S): at 17:47

## 2025-01-01 RX ADMIN — ACETYLCYSTEINE 4 MILLILITER(S): 200 INHALANT RESPIRATORY (INHALATION) at 16:53

## 2025-01-01 RX ADMIN — IPRATROPIUM BROMIDE AND ALBUTEROL SULFATE 3 MILLILITER(S): .5; 2.5 SOLUTION RESPIRATORY (INHALATION) at 01:35

## 2025-01-01 RX ADMIN — EPOETIN ALFA 10000 UNIT(S): 10000 SOLUTION INTRAVENOUS; SUBCUTANEOUS at 19:21

## 2025-01-01 RX ADMIN — Medication 4 MILLIGRAM(S): at 12:52

## 2025-01-01 RX ADMIN — SODIUM HYPOCHLORITE 1 APPLICATION(S): 0.12 SOLUTION TOPICAL at 05:16

## 2025-01-01 RX ADMIN — HYDROXYZINE HYDROCHLORIDE 25 MILLIGRAM(S): 25 TABLET, FILM COATED ORAL at 05:56

## 2025-01-01 RX ADMIN — HYDROXYZINE HYDROCHLORIDE 25 MILLIGRAM(S): 25 TABLET, FILM COATED ORAL at 17:58

## 2025-01-01 RX ADMIN — BACITRACIN ZINC AND POLYMYXIN B SULFATE 1 APPLICATION(S): 500; 10000 OINTMENT TOPICAL at 05:03

## 2025-01-01 RX ADMIN — Medication 81 MILLIGRAM(S): at 13:20

## 2025-01-01 RX ADMIN — Medication 1000 MILLIGRAM(S): at 22:40

## 2025-01-01 RX ADMIN — HYDROXYZINE HYDROCHLORIDE 25 MILLIGRAM(S): 25 TABLET, FILM COATED ORAL at 13:58

## 2025-01-01 RX ADMIN — OXYCODONE HYDROCHLORIDE 10 MILLIGRAM(S): 30 TABLET ORAL at 21:14

## 2025-01-01 RX ADMIN — Medication 250 MILLILITER(S): at 18:26

## 2025-01-01 RX ADMIN — HEPARIN SODIUM 5000 UNIT(S): 1000 INJECTION INTRAVENOUS; SUBCUTANEOUS at 14:37

## 2025-01-01 RX ADMIN — EPOETIN ALFA 10000 UNIT(S): 10000 SOLUTION INTRAVENOUS; SUBCUTANEOUS at 13:29

## 2025-01-01 RX ADMIN — Medication 2 MILLIGRAM(S): at 15:49

## 2025-01-01 RX ADMIN — OXYCODONE HYDROCHLORIDE 10 MILLIGRAM(S): 30 TABLET ORAL at 12:24

## 2025-01-01 RX ADMIN — Medication 400 MILLIGRAM(S): at 16:51

## 2025-01-01 RX ADMIN — Medication 2 MILLIGRAM(S): at 20:27

## 2025-01-01 RX ADMIN — IRON SUCROSE 210 MILLIGRAM(S): 20 INJECTION, SOLUTION INTRAVENOUS at 09:58

## 2025-01-01 RX ADMIN — Medication 0.2 MILLIGRAM(S): at 11:00

## 2025-01-01 RX ADMIN — MEROPENEM 100 MILLIGRAM(S): 1 INJECTION INTRAVENOUS at 17:27

## 2025-01-01 RX ADMIN — Medication 2001 MILLIGRAM(S): at 18:03

## 2025-01-01 RX ADMIN — OXYCODONE HYDROCHLORIDE 10 MILLIGRAM(S): 30 TABLET ORAL at 03:51

## 2025-01-01 RX ADMIN — CLOPIDOGREL BISULFATE 75 MILLIGRAM(S): 75 TABLET, FILM COATED ORAL at 12:35

## 2025-01-01 RX ADMIN — ATORVASTATIN CALCIUM 40 MILLIGRAM(S): 80 TABLET, FILM COATED ORAL at 21:14

## 2025-01-01 RX ADMIN — POLYETHYLENE GLYCOL 3350 17 GRAM(S): 17 POWDER, FOR SOLUTION ORAL at 12:24

## 2025-01-01 RX ADMIN — Medication 2 MILLIGRAM(S): at 00:15

## 2025-01-01 RX ADMIN — Medication 400 MILLIGRAM(S): at 06:25

## 2025-01-01 RX ADMIN — OXYCODONE HYDROCHLORIDE 10 MILLIGRAM(S): 30 TABLET ORAL at 21:43

## 2025-01-01 RX ADMIN — OXYCODONE HYDROCHLORIDE 30 MILLIGRAM(S): 30 TABLET ORAL at 05:03

## 2025-01-01 RX ADMIN — PREGABALIN 25 MILLIGRAM(S): 75 CAPSULE ORAL at 18:04

## 2025-01-01 RX ADMIN — Medication 2 MILLIGRAM(S): at 14:17

## 2025-01-01 RX ADMIN — HEPARIN SODIUM 5000 UNIT(S): 1000 INJECTION INTRAVENOUS; SUBCUTANEOUS at 21:03

## 2025-01-01 RX ADMIN — OXYCODONE HYDROCHLORIDE 30 MILLIGRAM(S): 30 TABLET ORAL at 18:19

## 2025-01-01 RX ADMIN — POLYETHYLENE GLYCOL 3350 17 GRAM(S): 17 POWDER, FOR SOLUTION ORAL at 11:31

## 2025-01-01 RX ADMIN — Medication 400 MILLIGRAM(S): at 04:53

## 2025-01-01 RX ADMIN — Medication 400 MILLIGRAM(S): at 04:54

## 2025-01-01 RX ADMIN — Medication 2 MILLIGRAM(S): at 20:47

## 2025-01-01 RX ADMIN — Medication 1 TABLET(S): at 11:55

## 2025-01-01 RX ADMIN — METHOCARBAMOL 750 MILLIGRAM(S): 500 TABLET, FILM COATED ORAL at 13:23

## 2025-01-01 RX ADMIN — OXYCODONE HYDROCHLORIDE 30 MILLIGRAM(S): 30 TABLET ORAL at 05:24

## 2025-01-01 RX ADMIN — CLOPIDOGREL BISULFATE 75 MILLIGRAM(S): 75 TABLET, FILM COATED ORAL at 12:02

## 2025-01-01 RX ADMIN — METHOCARBAMOL 750 MILLIGRAM(S): 500 TABLET, FILM COATED ORAL at 21:12

## 2025-01-01 RX ADMIN — OXYCODONE HYDROCHLORIDE 10 MILLIGRAM(S): 30 TABLET ORAL at 02:46

## 2025-01-01 RX ADMIN — Medication 1 APPLICATION(S): at 17:34

## 2025-01-01 RX ADMIN — CLOPIDOGREL BISULFATE 75 MILLIGRAM(S): 75 TABLET, FILM COATED ORAL at 12:18

## 2025-01-01 RX ADMIN — OXYCODONE HYDROCHLORIDE 20 MILLIGRAM(S): 30 TABLET ORAL at 14:58

## 2025-01-01 RX ADMIN — CLOPIDOGREL BISULFATE 75 MILLIGRAM(S): 75 TABLET, FILM COATED ORAL at 12:24

## 2025-01-01 RX ADMIN — POLYETHYLENE GLYCOL 3350 17 GRAM(S): 17 POWDER, FOR SOLUTION ORAL at 13:00

## 2025-01-01 RX ADMIN — ATORVASTATIN CALCIUM 40 MILLIGRAM(S): 80 TABLET, FILM COATED ORAL at 21:57

## 2025-01-01 RX ADMIN — HYDROXYZINE HYDROCHLORIDE 25 MILLIGRAM(S): 25 TABLET, FILM COATED ORAL at 06:37

## 2025-01-01 RX ADMIN — MEROPENEM 100 MILLIGRAM(S): 1 INJECTION INTRAVENOUS at 05:10

## 2025-01-01 RX ADMIN — OXYCODONE HYDROCHLORIDE 30 MILLIGRAM(S): 30 TABLET ORAL at 05:40

## 2025-01-01 RX ADMIN — OXYCODONE HYDROCHLORIDE 15 MILLIGRAM(S): 30 TABLET ORAL at 17:44

## 2025-01-01 RX ADMIN — HYDROXYZINE HYDROCHLORIDE 25 MILLIGRAM(S): 25 TABLET, FILM COATED ORAL at 12:25

## 2025-01-01 RX ADMIN — OXYCODONE HYDROCHLORIDE 10 MILLIGRAM(S): 30 TABLET ORAL at 00:45

## 2025-01-01 RX ADMIN — IPRATROPIUM BROMIDE AND ALBUTEROL SULFATE 3 MILLILITER(S): .5; 2.5 SOLUTION RESPIRATORY (INHALATION) at 17:24

## 2025-01-01 RX ADMIN — OXYCODONE HYDROCHLORIDE 10 MILLIGRAM(S): 30 TABLET ORAL at 16:15

## 2025-01-01 RX ADMIN — OXYCODONE HYDROCHLORIDE 20 MILLIGRAM(S): 30 TABLET ORAL at 00:37

## 2025-01-01 RX ADMIN — SODIUM HYPOCHLORITE 1 APPLICATION(S): 0.12 SOLUTION TOPICAL at 06:31

## 2025-01-01 RX ADMIN — POLYETHYLENE GLYCOL 3350 17 GRAM(S): 17 POWDER, FOR SOLUTION ORAL at 12:01

## 2025-01-01 RX ADMIN — OXYCODONE HYDROCHLORIDE 30 MILLIGRAM(S): 30 TABLET ORAL at 17:40

## 2025-01-01 RX ADMIN — Medication 25 GRAM(S): at 23:37

## 2025-01-01 RX ADMIN — OXYCODONE HYDROCHLORIDE 30 MILLIGRAM(S): 30 TABLET ORAL at 19:34

## 2025-01-01 RX ADMIN — Medication 1 APPLICATION(S): at 04:56

## 2025-01-01 RX ADMIN — OXYCODONE HYDROCHLORIDE 20 MILLIGRAM(S): 30 TABLET ORAL at 14:25

## 2025-01-01 RX ADMIN — IPRATROPIUM BROMIDE AND ALBUTEROL SULFATE 3 MILLILITER(S): .5; 2.5 SOLUTION RESPIRATORY (INHALATION) at 23:49

## 2025-01-01 RX ADMIN — DEXTROMETHORPHAN HBR, GUAIFENESIN 1200 MILLIGRAM(S): 200 LIQUID ORAL at 17:45

## 2025-01-01 RX ADMIN — ATORVASTATIN CALCIUM 40 MILLIGRAM(S): 80 TABLET, FILM COATED ORAL at 22:13

## 2025-01-01 RX ADMIN — Medication 40 MILLIGRAM(S): at 05:15

## 2025-01-01 RX ADMIN — HYDROXYZINE HYDROCHLORIDE 25 MILLIGRAM(S): 25 TABLET, FILM COATED ORAL at 22:45

## 2025-01-01 RX ADMIN — OXYCODONE HYDROCHLORIDE 10 MILLIGRAM(S): 30 TABLET ORAL at 14:17

## 2025-01-01 RX ADMIN — SODIUM CHLORIDE 50 MILLILITER(S): 3 INJECTION, SOLUTION INTRAVENOUS at 09:37

## 2025-01-01 RX ADMIN — HYDROXYZINE HYDROCHLORIDE 25 MILLIGRAM(S): 25 TABLET, FILM COATED ORAL at 14:26

## 2025-01-01 RX ADMIN — ATORVASTATIN CALCIUM 40 MILLIGRAM(S): 80 TABLET, FILM COATED ORAL at 21:43

## 2025-01-01 RX ADMIN — Medication 1 APPLICATION(S): at 17:41

## 2025-01-01 RX ADMIN — HEPARIN SODIUM 5000 UNIT(S): 1000 INJECTION INTRAVENOUS; SUBCUTANEOUS at 21:13

## 2025-01-01 RX ADMIN — Medication 1334 MILLIGRAM(S): at 08:18

## 2025-01-01 RX ADMIN — Medication 1 APPLICATION(S): at 13:09

## 2025-01-01 RX ADMIN — MIDODRINE HYDROCHLORIDE 10 MILLIGRAM(S): 5 TABLET ORAL at 09:51

## 2025-01-01 RX ADMIN — POLYETHYLENE GLYCOL 3350 17 GRAM(S): 17 POWDER, FOR SOLUTION ORAL at 12:16

## 2025-01-01 RX ADMIN — OXYCODONE HYDROCHLORIDE 10 MILLIGRAM(S): 30 TABLET ORAL at 01:06

## 2025-01-01 RX ADMIN — HEPARIN SODIUM 1700 UNIT(S)/HR: 1000 INJECTION INTRAVENOUS; SUBCUTANEOUS at 14:54

## 2025-01-01 RX ADMIN — HEPARIN SODIUM 5000 UNIT(S): 1000 INJECTION INTRAVENOUS; SUBCUTANEOUS at 21:43

## 2025-01-01 RX ADMIN — OXYCODONE HYDROCHLORIDE 30 MILLIGRAM(S): 30 TABLET ORAL at 18:26

## 2025-01-01 RX ADMIN — ATORVASTATIN CALCIUM 40 MILLIGRAM(S): 80 TABLET, FILM COATED ORAL at 23:02

## 2025-01-01 RX ADMIN — Medication 80 MILLIGRAM(S): at 17:22

## 2025-01-01 RX ADMIN — OXYCODONE HYDROCHLORIDE 10 MILLIGRAM(S): 30 TABLET ORAL at 19:02

## 2025-01-01 RX ADMIN — SODIUM CHLORIDE 9.95 MILLILITER(S): 9 INJECTION, SOLUTION INTRAVENOUS at 19:11

## 2025-01-01 RX ADMIN — Medication 2 MILLIGRAM(S): at 19:30

## 2025-01-01 RX ADMIN — OXYCODONE HYDROCHLORIDE 5 MILLIGRAM(S): 30 TABLET ORAL at 22:23

## 2025-01-01 RX ADMIN — SODIUM HYPOCHLORITE 1 APPLICATION(S): 0.12 SOLUTION TOPICAL at 06:02

## 2025-01-01 RX ADMIN — Medication 400 MILLIGRAM(S): at 18:05

## 2025-01-01 RX ADMIN — Medication 1 MILLIGRAM(S): at 15:06

## 2025-01-01 RX ADMIN — Medication 2 MILLIGRAM(S): at 08:52

## 2025-01-01 RX ADMIN — HYDROXYZINE HYDROCHLORIDE 25 MILLIGRAM(S): 25 TABLET, FILM COATED ORAL at 21:13

## 2025-01-01 RX ADMIN — Medication 1 APPLICATION(S): at 18:30

## 2025-01-01 RX ADMIN — Medication 1 APPLICATION(S): at 06:10

## 2025-01-01 RX ADMIN — GABAPENTIN 300 MILLIGRAM(S): 400 CAPSULE ORAL at 21:40

## 2025-01-01 RX ADMIN — SODIUM HYPOCHLORITE 1 APPLICATION(S): 0.12 SOLUTION TOPICAL at 17:00

## 2025-01-01 RX ADMIN — Medication 2 MILLIGRAM(S): at 21:33

## 2025-01-01 RX ADMIN — ACETYLCYSTEINE 4 MILLILITER(S): 200 INHALANT RESPIRATORY (INHALATION) at 00:46

## 2025-01-01 RX ADMIN — SODIUM HYPOCHLORITE 1 APPLICATION(S): 0.12 SOLUTION TOPICAL at 16:07

## 2025-01-01 RX ADMIN — SODIUM HYPOCHLORITE 1 APPLICATION(S): 0.12 SOLUTION TOPICAL at 05:02

## 2025-01-01 RX ADMIN — IPRATROPIUM BROMIDE AND ALBUTEROL SULFATE 3 MILLILITER(S): .5; 2.5 SOLUTION RESPIRATORY (INHALATION) at 17:08

## 2025-01-01 RX ADMIN — HYDROXYZINE HYDROCHLORIDE 25 MILLIGRAM(S): 25 TABLET, FILM COATED ORAL at 17:41

## 2025-01-01 RX ADMIN — HEPARIN SODIUM 5000 UNIT(S): 1000 INJECTION INTRAVENOUS; SUBCUTANEOUS at 21:12

## 2025-01-01 RX ADMIN — HYDROXYZINE HYDROCHLORIDE 25 MILLIGRAM(S): 25 TABLET, FILM COATED ORAL at 13:16

## 2025-01-01 RX ADMIN — ACETYLCYSTEINE 4 MILLILITER(S): 200 INHALANT RESPIRATORY (INHALATION) at 18:34

## 2025-01-01 RX ADMIN — Medication 2 MILLIGRAM(S): at 16:58

## 2025-01-01 RX ADMIN — Medication 400 MILLIGRAM(S): at 18:07

## 2025-01-01 RX ADMIN — HEPARIN SODIUM 5000 UNIT(S): 1000 INJECTION INTRAVENOUS; SUBCUTANEOUS at 14:05

## 2025-01-01 RX ADMIN — OXYCODONE HYDROCHLORIDE 30 MILLIGRAM(S): 30 TABLET ORAL at 17:26

## 2025-01-01 RX ADMIN — Medication 400 MILLIGRAM(S): at 06:04

## 2025-01-01 RX ADMIN — OXYCODONE HYDROCHLORIDE 30 MILLIGRAM(S): 30 TABLET ORAL at 05:44

## 2025-01-01 RX ADMIN — DEXTROMETHORPHAN HBR, GUAIFENESIN 1200 MILLIGRAM(S): 200 LIQUID ORAL at 05:03

## 2025-01-01 RX ADMIN — Medication 400 MILLIGRAM(S): at 18:23

## 2025-01-01 RX ADMIN — Medication 1334 MILLIGRAM(S): at 11:31

## 2025-01-01 RX ADMIN — OXYCODONE HYDROCHLORIDE 10 MILLIGRAM(S): 30 TABLET ORAL at 20:18

## 2025-01-01 RX ADMIN — OXYCODONE HYDROCHLORIDE 10 MILLIGRAM(S): 30 TABLET ORAL at 09:50

## 2025-01-01 RX ADMIN — SODIUM HYPOCHLORITE 1 APPLICATION(S): 0.12 SOLUTION TOPICAL at 21:52

## 2025-01-01 RX ADMIN — Medication 1 TABLET(S): at 08:58

## 2025-01-01 RX ADMIN — MIDODRINE HYDROCHLORIDE 10 MILLIGRAM(S): 5 TABLET ORAL at 18:02

## 2025-01-01 RX ADMIN — Medication 400 MILLIGRAM(S): at 06:00

## 2025-01-01 RX ADMIN — HYDROXYZINE HYDROCHLORIDE 25 MILLIGRAM(S): 25 TABLET, FILM COATED ORAL at 21:08

## 2025-01-01 RX ADMIN — Medication 40 MILLIGRAM(S): at 05:58

## 2025-01-01 RX ADMIN — OXYCODONE HYDROCHLORIDE 30 MILLIGRAM(S): 30 TABLET ORAL at 17:44

## 2025-01-01 RX ADMIN — Medication 400 MILLIGRAM(S): at 17:19

## 2025-01-01 RX ADMIN — HYDROXYZINE HYDROCHLORIDE 25 MILLIGRAM(S): 25 TABLET, FILM COATED ORAL at 05:19

## 2025-01-01 RX ADMIN — CLOPIDOGREL BISULFATE 75 MILLIGRAM(S): 75 TABLET, FILM COATED ORAL at 12:05

## 2025-01-01 RX ADMIN — OXYCODONE HYDROCHLORIDE 10 MILLIGRAM(S): 30 TABLET ORAL at 13:56

## 2025-01-01 RX ADMIN — SODIUM CHLORIDE 1000 MILLILITER(S): 9 INJECTION, SOLUTION INTRAVENOUS at 16:03

## 2025-01-01 RX ADMIN — Medication 2 MILLIGRAM(S): at 21:58

## 2025-01-01 RX ADMIN — IPRATROPIUM BROMIDE AND ALBUTEROL SULFATE 3 MILLILITER(S): .5; 2.5 SOLUTION RESPIRATORY (INHALATION) at 05:44

## 2025-01-01 RX ADMIN — OXYCODONE HYDROCHLORIDE 10 MILLIGRAM(S): 30 TABLET ORAL at 14:33

## 2025-01-01 RX ADMIN — HEPARIN SODIUM 0 UNIT(S)/HR: 1000 INJECTION INTRAVENOUS; SUBCUTANEOUS at 06:17

## 2025-01-01 RX ADMIN — HYDROXYZINE HYDROCHLORIDE 25 MILLIGRAM(S): 25 TABLET, FILM COATED ORAL at 21:16

## 2025-01-01 RX ADMIN — Medication 400 MILLIGRAM(S): at 05:32

## 2025-01-01 RX ADMIN — Medication 1 APPLICATION(S): at 08:13

## 2025-01-01 RX ADMIN — Medication 650 MILLIGRAM(S): at 20:22

## 2025-01-01 RX ADMIN — DEXTROMETHORPHAN HBR, GUAIFENESIN 1200 MILLIGRAM(S): 200 LIQUID ORAL at 17:23

## 2025-01-01 RX ADMIN — Medication 40 MILLIGRAM(S): at 05:40

## 2025-01-01 RX ADMIN — HEPARIN SODIUM 1300 UNIT(S)/HR: 1000 INJECTION INTRAVENOUS; SUBCUTANEOUS at 07:26

## 2025-01-01 RX ADMIN — Medication 80 MILLIGRAM(S): at 22:56

## 2025-01-01 RX ADMIN — Medication 1 APPLICATION(S): at 11:50

## 2025-01-01 RX ADMIN — Medication 2 TABLET(S): at 22:20

## 2025-01-01 RX ADMIN — OXYCODONE HYDROCHLORIDE 20 MILLIGRAM(S): 30 TABLET ORAL at 06:09

## 2025-01-01 RX ADMIN — OXYCODONE HYDROCHLORIDE 10 MILLIGRAM(S): 30 TABLET ORAL at 12:10

## 2025-01-01 RX ADMIN — Medication 2 MILLIGRAM(S): at 21:06

## 2025-01-01 RX ADMIN — DEXTROMETHORPHAN HBR, GUAIFENESIN 1200 MILLIGRAM(S): 200 LIQUID ORAL at 05:35

## 2025-01-01 RX ADMIN — HEPARIN SODIUM 200 UNIT(S)/HR: 1000 INJECTION INTRAVENOUS; SUBCUTANEOUS at 14:49

## 2025-01-01 RX ADMIN — HEPARIN SODIUM 5000 UNIT(S): 1000 INJECTION INTRAVENOUS; SUBCUTANEOUS at 23:40

## 2025-01-01 RX ADMIN — HYDROXYZINE HYDROCHLORIDE 25 MILLIGRAM(S): 25 TABLET, FILM COATED ORAL at 21:14

## 2025-01-01 RX ADMIN — Medication 2 TABLET(S): at 21:20

## 2025-01-01 RX ADMIN — DEXTROMETHORPHAN HBR, GUAIFENESIN 1200 MILLIGRAM(S): 200 LIQUID ORAL at 05:16

## 2025-01-01 RX ADMIN — Medication 400 MILLIGRAM(S): at 05:36

## 2025-01-01 RX ADMIN — Medication 1 TABLET(S): at 17:43

## 2025-01-01 RX ADMIN — Medication 2 MILLIGRAM(S): at 21:26

## 2025-01-01 RX ADMIN — OXYCODONE HYDROCHLORIDE 20 MILLIGRAM(S): 30 TABLET ORAL at 21:11

## 2025-01-01 RX ADMIN — OXYCODONE HYDROCHLORIDE 10 MILLIGRAM(S): 30 TABLET ORAL at 19:13

## 2025-01-01 RX ADMIN — OXYCODONE HYDROCHLORIDE 10 MILLIGRAM(S): 30 TABLET ORAL at 16:46

## 2025-01-01 RX ADMIN — MUPIROCIN CALCIUM 1 APPLICATION(S): 20 CREAM TOPICAL at 08:24

## 2025-01-01 RX ADMIN — Medication 1 APPLICATION(S): at 05:25

## 2025-01-01 RX ADMIN — Medication 400 MILLIGRAM(S): at 17:37

## 2025-01-01 RX ADMIN — Medication 2001 MILLIGRAM(S): at 17:12

## 2025-01-01 RX ADMIN — METHOCARBAMOL 750 MILLIGRAM(S): 500 TABLET, FILM COATED ORAL at 15:03

## 2025-01-01 RX ADMIN — OXYCODONE HYDROCHLORIDE 30 MILLIGRAM(S): 30 TABLET ORAL at 18:03

## 2025-01-01 RX ADMIN — Medication 400 MILLIGRAM(S): at 05:17

## 2025-01-01 RX ADMIN — Medication 1334 MILLIGRAM(S): at 17:07

## 2025-01-01 RX ADMIN — HYDROXYZINE HYDROCHLORIDE 25 MILLIGRAM(S): 25 TABLET, FILM COATED ORAL at 17:12

## 2025-01-01 RX ADMIN — Medication 81 MILLIGRAM(S): at 11:48

## 2025-01-01 RX ADMIN — Medication 1 APPLICATION(S): at 07:41

## 2025-01-01 RX ADMIN — Medication 400 MILLIGRAM(S): at 20:48

## 2025-01-01 RX ADMIN — Medication 1 APPLICATION(S): at 05:35

## 2025-01-01 RX ADMIN — OXYCODONE HYDROCHLORIDE 10 MILLIGRAM(S): 30 TABLET ORAL at 01:46

## 2025-01-01 RX ADMIN — Medication 1 TABLET(S): at 08:52

## 2025-01-01 RX ADMIN — Medication 81 MILLIGRAM(S): at 11:53

## 2025-01-01 RX ADMIN — Medication 400 MILLIGRAM(S): at 17:04

## 2025-01-01 RX ADMIN — Medication 1 TABLET(S): at 17:27

## 2025-01-01 RX ADMIN — Medication 2 MILLIGRAM(S): at 14:15

## 2025-01-01 RX ADMIN — Medication 1 APPLICATION(S): at 12:48

## 2025-01-01 RX ADMIN — HYDROXYZINE HYDROCHLORIDE 25 MILLIGRAM(S): 25 TABLET, FILM COATED ORAL at 21:28

## 2025-01-01 RX ADMIN — Medication 166.67 MILLILITER(S): at 07:41

## 2025-01-01 RX ADMIN — Medication 1 TABLET(S): at 18:03

## 2025-01-01 RX ADMIN — OXYCODONE HYDROCHLORIDE 10 MILLIGRAM(S): 30 TABLET ORAL at 22:43

## 2025-01-01 RX ADMIN — ATORVASTATIN CALCIUM 40 MILLIGRAM(S): 80 TABLET, FILM COATED ORAL at 22:20

## 2025-01-01 RX ADMIN — OXYCODONE HYDROCHLORIDE 30 MILLIGRAM(S): 30 TABLET ORAL at 18:23

## 2025-01-01 RX ADMIN — Medication 2 MILLIGRAM(S): at 19:06

## 2025-01-01 RX ADMIN — Medication 1000 MILLIGRAM(S): at 02:30

## 2025-01-01 RX ADMIN — Medication 81 MILLIGRAM(S): at 12:10

## 2025-01-01 RX ADMIN — Medication 2 MILLIGRAM(S): at 11:14

## 2025-01-01 RX ADMIN — DEXTROMETHORPHAN HBR, GUAIFENESIN 1200 MILLIGRAM(S): 200 LIQUID ORAL at 05:04

## 2025-01-01 RX ADMIN — Medication 81 MILLIGRAM(S): at 11:51

## 2025-01-01 RX ADMIN — OXYCODONE HYDROCHLORIDE 10 MILLIGRAM(S): 30 TABLET ORAL at 13:17

## 2025-01-01 RX ADMIN — IPRATROPIUM BROMIDE AND ALBUTEROL SULFATE 3 MILLILITER(S): .5; 2.5 SOLUTION RESPIRATORY (INHALATION) at 23:04

## 2025-01-01 RX ADMIN — ATORVASTATIN CALCIUM 40 MILLIGRAM(S): 80 TABLET, FILM COATED ORAL at 23:25

## 2025-01-01 RX ADMIN — OXYCODONE HYDROCHLORIDE 30 MILLIGRAM(S): 30 TABLET ORAL at 17:34

## 2025-01-01 RX ADMIN — IPRATROPIUM BROMIDE AND ALBUTEROL SULFATE 3 MILLILITER(S): .5; 2.5 SOLUTION RESPIRATORY (INHALATION) at 17:04

## 2025-01-01 RX ADMIN — IPRATROPIUM BROMIDE AND ALBUTEROL SULFATE 3 MILLILITER(S): .5; 2.5 SOLUTION RESPIRATORY (INHALATION) at 18:24

## 2025-01-01 RX ADMIN — Medication 0.5 MILLIGRAM(S): at 16:53

## 2025-01-01 RX ADMIN — Medication 2 TABLET(S): at 21:46

## 2025-01-01 RX ADMIN — Medication 40 MILLIGRAM(S): at 06:03

## 2025-01-01 RX ADMIN — Medication 1 APPLICATION(S): at 15:49

## 2025-01-01 RX ADMIN — Medication 81 MILLIGRAM(S): at 16:03

## 2025-01-01 RX ADMIN — OXYCODONE HYDROCHLORIDE 10 MILLIGRAM(S): 30 TABLET ORAL at 22:27

## 2025-01-01 RX ADMIN — ATORVASTATIN CALCIUM 40 MILLIGRAM(S): 80 TABLET, FILM COATED ORAL at 21:49

## 2025-01-01 RX ADMIN — Medication 400 MILLIGRAM(S): at 17:23

## 2025-01-01 RX ADMIN — DEXTROMETHORPHAN HBR, GUAIFENESIN 1200 MILLIGRAM(S): 200 LIQUID ORAL at 19:32

## 2025-01-01 RX ADMIN — Medication 2 MILLIGRAM(S): at 22:00

## 2025-01-01 RX ADMIN — SODIUM HYPOCHLORITE 1 APPLICATION(S): 0.12 SOLUTION TOPICAL at 22:21

## 2025-01-01 RX ADMIN — Medication 0.5 MILLIGRAM(S): at 18:44

## 2025-01-01 RX ADMIN — Medication 81 MILLIGRAM(S): at 13:18

## 2025-01-01 RX ADMIN — POLYETHYLENE GLYCOL 3350 17 GRAM(S): 17 POWDER, FOR SOLUTION ORAL at 11:11

## 2025-01-01 RX ADMIN — Medication 2 TABLET(S): at 21:22

## 2025-01-01 RX ADMIN — SODIUM HYPOCHLORITE 1 APPLICATION(S): 0.12 SOLUTION TOPICAL at 06:04

## 2025-01-01 RX ADMIN — HYDROXYZINE HYDROCHLORIDE 25 MILLIGRAM(S): 25 TABLET, FILM COATED ORAL at 05:13

## 2025-01-01 RX ADMIN — Medication 2 MILLIGRAM(S): at 10:18

## 2025-01-01 RX ADMIN — Medication 4 MILLIGRAM(S): at 00:40

## 2025-01-01 RX ADMIN — Medication 80 MILLIGRAM(S): at 05:52

## 2025-01-01 RX ADMIN — MEROPENEM 100 MILLIGRAM(S): 1 INJECTION INTRAVENOUS at 18:02

## 2025-01-01 RX ADMIN — Medication 2 MILLIGRAM(S): at 22:08

## 2025-01-01 RX ADMIN — Medication 1 APPLICATION(S): at 17:51

## 2025-01-01 RX ADMIN — Medication 25 GRAM(S): at 14:08

## 2025-01-01 RX ADMIN — MEROPENEM 100 MILLIGRAM(S): 1 INJECTION INTRAVENOUS at 14:19

## 2025-01-01 RX ADMIN — MUPIROCIN CALCIUM 1 APPLICATION(S): 20 CREAM TOPICAL at 08:12

## 2025-01-01 RX ADMIN — Medication 400 MILLIGRAM(S): at 18:10

## 2025-01-01 RX ADMIN — Medication 25 GRAM(S): at 05:03

## 2025-01-01 RX ADMIN — ATORVASTATIN CALCIUM 40 MILLIGRAM(S): 80 TABLET, FILM COATED ORAL at 22:45

## 2025-01-01 RX ADMIN — Medication 2 MILLIGRAM(S): at 02:42

## 2025-01-01 RX ADMIN — DEXTROMETHORPHAN HBR, GUAIFENESIN 1200 MILLIGRAM(S): 200 LIQUID ORAL at 16:32

## 2025-01-01 RX ADMIN — OXYCODONE HYDROCHLORIDE 10 MILLIGRAM(S): 30 TABLET ORAL at 07:50

## 2025-01-01 RX ADMIN — OXYCODONE HYDROCHLORIDE 30 MILLIGRAM(S): 30 TABLET ORAL at 05:50

## 2025-01-01 RX ADMIN — ATORVASTATIN CALCIUM 40 MILLIGRAM(S): 80 TABLET, FILM COATED ORAL at 21:38

## 2025-01-01 RX ADMIN — Medication 400 MILLIGRAM(S): at 05:50

## 2025-01-01 RX ADMIN — HEPARIN SODIUM 5000 UNIT(S): 1000 INJECTION INTRAVENOUS; SUBCUTANEOUS at 16:51

## 2025-01-01 RX ADMIN — Medication 81 MILLIGRAM(S): at 12:01

## 2025-01-01 RX ADMIN — Medication 1 MILLIGRAM(S): at 11:00

## 2025-01-01 RX ADMIN — CLOPIDOGREL BISULFATE 75 MILLIGRAM(S): 75 TABLET, FILM COATED ORAL at 12:01

## 2025-01-01 RX ADMIN — OXYCODONE HYDROCHLORIDE 30 MILLIGRAM(S): 30 TABLET ORAL at 17:25

## 2025-01-01 RX ADMIN — CLOPIDOGREL BISULFATE 75 MILLIGRAM(S): 75 TABLET, FILM COATED ORAL at 14:34

## 2025-01-01 RX ADMIN — OXYCODONE HYDROCHLORIDE 20 MILLIGRAM(S): 30 TABLET ORAL at 16:50

## 2025-01-01 RX ADMIN — HEPARIN SODIUM 5000 UNIT(S): 1000 INJECTION INTRAVENOUS; SUBCUTANEOUS at 21:22

## 2025-01-01 RX ADMIN — Medication 2 MILLIGRAM(S): at 09:36

## 2025-01-01 RX ADMIN — Medication 2 MILLIGRAM(S): at 22:26

## 2025-01-01 RX ADMIN — MEROPENEM 100 MILLIGRAM(S): 1 INJECTION INTRAVENOUS at 06:06

## 2025-01-01 RX ADMIN — OXYCODONE HYDROCHLORIDE 30 MILLIGRAM(S): 30 TABLET ORAL at 19:00

## 2025-01-01 RX ADMIN — Medication 1 APPLICATION(S): at 12:33

## 2025-01-01 RX ADMIN — Medication 2 MILLIGRAM(S): at 09:45

## 2025-01-01 RX ADMIN — HYDROXYZINE HYDROCHLORIDE 25 MILLIGRAM(S): 25 TABLET, FILM COATED ORAL at 18:58

## 2025-01-01 RX ADMIN — Medication 1 APPLICATION(S): at 05:00

## 2025-01-01 RX ADMIN — Medication 2 MILLIGRAM(S): at 16:15

## 2025-01-01 RX ADMIN — Medication 1 APPLICATION(S): at 17:13

## 2025-01-01 RX ADMIN — HYDROXYZINE HYDROCHLORIDE 25 MILLIGRAM(S): 25 TABLET, FILM COATED ORAL at 05:40

## 2025-01-01 RX ADMIN — OXYCODONE HYDROCHLORIDE 30 MILLIGRAM(S): 30 TABLET ORAL at 18:18

## 2025-01-01 RX ADMIN — OXYCODONE HYDROCHLORIDE 10 MILLIGRAM(S): 30 TABLET ORAL at 15:39

## 2025-01-01 RX ADMIN — OXYCODONE HYDROCHLORIDE 30 MILLIGRAM(S): 30 TABLET ORAL at 08:48

## 2025-01-01 RX ADMIN — HEPARIN SODIUM 1300 UNIT(S)/HR: 1000 INJECTION INTRAVENOUS; SUBCUTANEOUS at 01:40

## 2025-01-01 RX ADMIN — SODIUM HYPOCHLORITE 1 APPLICATION(S): 0.12 SOLUTION TOPICAL at 06:10

## 2025-01-01 RX ADMIN — Medication 1 APPLICATION(S): at 17:31

## 2025-01-01 RX ADMIN — Medication 2 MILLIGRAM(S): at 22:28

## 2025-01-01 RX ADMIN — HYDROXYZINE HYDROCHLORIDE 25 MILLIGRAM(S): 25 TABLET, FILM COATED ORAL at 05:50

## 2025-01-01 RX ADMIN — MUPIROCIN CALCIUM 1 APPLICATION(S): 20 CREAM TOPICAL at 10:34

## 2025-01-01 RX ADMIN — Medication 1 MILLIGRAM(S): at 20:31

## 2025-01-01 RX ADMIN — SODIUM HYPOCHLORITE 1 APPLICATION(S): 0.12 SOLUTION TOPICAL at 05:58

## 2025-01-01 RX ADMIN — Medication 81 MILLIGRAM(S): at 15:47

## 2025-01-01 RX ADMIN — Medication 400 MILLIGRAM(S): at 06:01

## 2025-01-01 RX ADMIN — CLOPIDOGREL BISULFATE 75 MILLIGRAM(S): 75 TABLET, FILM COATED ORAL at 13:16

## 2025-01-01 RX ADMIN — HYDROXYZINE HYDROCHLORIDE 25 MILLIGRAM(S): 25 TABLET, FILM COATED ORAL at 06:23

## 2025-01-01 RX ADMIN — Medication 0.5 MILLIGRAM(S): at 13:00

## 2025-01-01 RX ADMIN — Medication 40 MILLIGRAM(S): at 05:25

## 2025-01-01 RX ADMIN — Medication 1 APPLICATION(S): at 06:32

## 2025-01-01 RX ADMIN — Medication 2 MILLIGRAM(S): at 12:34

## 2025-01-01 RX ADMIN — MUPIROCIN CALCIUM 1 APPLICATION(S): 20 CREAM TOPICAL at 13:18

## 2025-01-01 RX ADMIN — IPRATROPIUM BROMIDE AND ALBUTEROL SULFATE 3 MILLILITER(S): .5; 2.5 SOLUTION RESPIRATORY (INHALATION) at 11:29

## 2025-01-01 RX ADMIN — SODIUM HYPOCHLORITE 1 APPLICATION(S): 0.12 SOLUTION TOPICAL at 23:00

## 2025-01-01 RX ADMIN — EPOETIN ALFA 10000 UNIT(S): 10000 SOLUTION INTRAVENOUS; SUBCUTANEOUS at 07:29

## 2025-01-01 RX ADMIN — Medication 1 MILLIGRAM(S): at 05:15

## 2025-01-01 RX ADMIN — Medication 400 MILLIGRAM(S): at 17:02

## 2025-01-01 RX ADMIN — OXYCODONE HYDROCHLORIDE 15 MILLIGRAM(S): 30 TABLET ORAL at 06:03

## 2025-01-01 RX ADMIN — Medication 1 APPLICATION(S): at 18:17

## 2025-01-01 RX ADMIN — ALTEPLASE 2 MILLIGRAM(S): 2.2 INJECTION, POWDER, LYOPHILIZED, FOR SOLUTION INTRAVENOUS at 17:29

## 2025-01-01 RX ADMIN — Medication 81 MILLIGRAM(S): at 12:07

## 2025-01-01 RX ADMIN — HYDROXYZINE HYDROCHLORIDE 25 MILLIGRAM(S): 25 TABLET, FILM COATED ORAL at 05:15

## 2025-01-01 RX ADMIN — Medication 400 MILLIGRAM(S): at 17:26

## 2025-01-01 RX ADMIN — Medication 1 TABLET(S): at 09:03

## 2025-01-01 RX ADMIN — MEROPENEM 100 MILLIGRAM(S): 1 INJECTION INTRAVENOUS at 14:05

## 2025-01-01 RX ADMIN — HEPARIN SODIUM 1300 UNIT(S)/HR: 1000 INJECTION INTRAVENOUS; SUBCUTANEOUS at 19:22

## 2025-01-01 RX ADMIN — Medication 400 MILLIGRAM(S): at 05:04

## 2025-01-01 RX ADMIN — OXYCODONE HYDROCHLORIDE 5 MILLIGRAM(S): 30 TABLET ORAL at 10:20

## 2025-01-01 RX ADMIN — OXYCODONE HYDROCHLORIDE 30 MILLIGRAM(S): 30 TABLET ORAL at 06:28

## 2025-01-01 RX ADMIN — Medication 2 MILLIGRAM(S): at 22:10

## 2025-01-01 RX ADMIN — Medication 1 TABLET(S): at 13:03

## 2025-01-01 RX ADMIN — Medication 1334 MILLIGRAM(S): at 16:41

## 2025-01-01 RX ADMIN — Medication 1 APPLICATION(S): at 17:42

## 2025-01-01 RX ADMIN — Medication 1 APPLICATION(S): at 05:55

## 2025-01-01 RX ADMIN — Medication 400 MILLIGRAM(S): at 17:27

## 2025-01-01 RX ADMIN — HEPARIN SODIUM 5000 UNIT(S): 1000 INJECTION INTRAVENOUS; SUBCUTANEOUS at 14:19

## 2025-01-01 RX ADMIN — Medication 1 APPLICATION(S): at 17:22

## 2025-01-01 RX ADMIN — Medication 1 TABLET(S): at 05:31

## 2025-01-01 RX ADMIN — IPRATROPIUM BROMIDE AND ALBUTEROL SULFATE 3 MILLILITER(S): .5; 2.5 SOLUTION RESPIRATORY (INHALATION) at 16:53

## 2025-01-01 RX ADMIN — DEXTROMETHORPHAN HBR, GUAIFENESIN 1200 MILLIGRAM(S): 200 LIQUID ORAL at 05:18

## 2025-01-01 RX ADMIN — OXYCODONE HYDROCHLORIDE 30 MILLIGRAM(S): 30 TABLET ORAL at 05:07

## 2025-01-01 RX ADMIN — Medication 3 MILLIGRAM(S): at 23:45

## 2025-01-01 RX ADMIN — SODIUM HYPOCHLORITE 1 APPLICATION(S): 0.12 SOLUTION TOPICAL at 05:00

## 2025-01-01 RX ADMIN — Medication 81 MILLIGRAM(S): at 12:16

## 2025-01-01 RX ADMIN — Medication 2 MILLIGRAM(S): at 19:24

## 2025-01-01 RX ADMIN — MIDODRINE HYDROCHLORIDE 20 MILLIGRAM(S): 5 TABLET ORAL at 19:10

## 2025-01-01 RX ADMIN — Medication 400 MILLIGRAM(S): at 18:39

## 2025-01-01 RX ADMIN — Medication 2001 MILLIGRAM(S): at 19:52

## 2025-01-01 RX ADMIN — Medication 1 TABLET(S): at 09:44

## 2025-01-01 RX ADMIN — IPRATROPIUM BROMIDE AND ALBUTEROL SULFATE 3 MILLILITER(S): .5; 2.5 SOLUTION RESPIRATORY (INHALATION) at 11:08

## 2025-01-01 RX ADMIN — CLOPIDOGREL BISULFATE 75 MILLIGRAM(S): 75 TABLET, FILM COATED ORAL at 12:26

## 2025-01-01 RX ADMIN — Medication 81 MILLIGRAM(S): at 12:24

## 2025-01-01 RX ADMIN — Medication 81 MILLIGRAM(S): at 11:30

## 2025-01-01 RX ADMIN — Medication 2 MILLIGRAM(S): at 23:34

## 2025-01-01 RX ADMIN — HEPARIN SODIUM 5000 UNIT(S): 1000 INJECTION INTRAVENOUS; SUBCUTANEOUS at 22:12

## 2025-01-01 RX ADMIN — Medication 250 MILLIGRAM(S): at 03:45

## 2025-01-01 RX ADMIN — OXYCODONE HYDROCHLORIDE 5 MILLIGRAM(S): 30 TABLET ORAL at 00:10

## 2025-01-01 RX ADMIN — Medication 400 MILLIGRAM(S): at 19:29

## 2025-01-01 RX ADMIN — Medication 1 APPLICATION(S): at 05:09

## 2025-01-01 RX ADMIN — Medication 2 MILLIGRAM(S): at 11:42

## 2025-01-01 RX ADMIN — DEXTROMETHORPHAN HBR, GUAIFENESIN 1200 MILLIGRAM(S): 200 LIQUID ORAL at 17:02

## 2025-01-01 RX ADMIN — Medication 40 MILLIGRAM(S): at 05:35

## 2025-01-01 RX ADMIN — OXYCODONE HYDROCHLORIDE 20 MILLIGRAM(S): 30 TABLET ORAL at 09:30

## 2025-01-01 RX ADMIN — Medication 1334 MILLIGRAM(S): at 12:30

## 2025-01-01 RX ADMIN — METHOCARBAMOL 750 MILLIGRAM(S): 500 TABLET, FILM COATED ORAL at 06:09

## 2025-01-01 RX ADMIN — Medication 2 MILLIGRAM(S): at 19:35

## 2025-01-01 RX ADMIN — OXYCODONE HYDROCHLORIDE 10 MILLIGRAM(S): 30 TABLET ORAL at 13:13

## 2025-01-01 RX ADMIN — OXYCODONE HYDROCHLORIDE 5 MILLIGRAM(S): 30 TABLET ORAL at 08:45

## 2025-01-01 RX ADMIN — CLOPIDOGREL BISULFATE 75 MILLIGRAM(S): 75 TABLET, FILM COATED ORAL at 13:17

## 2025-01-01 RX ADMIN — IPRATROPIUM BROMIDE AND ALBUTEROL SULFATE 3 MILLILITER(S): .5; 2.5 SOLUTION RESPIRATORY (INHALATION) at 05:40

## 2025-01-01 RX ADMIN — IPRATROPIUM BROMIDE AND ALBUTEROL SULFATE 3 MILLILITER(S): .5; 2.5 SOLUTION RESPIRATORY (INHALATION) at 21:37

## 2025-01-01 RX ADMIN — HYDROXYZINE HYDROCHLORIDE 25 MILLIGRAM(S): 25 TABLET, FILM COATED ORAL at 08:06

## 2025-01-01 RX ADMIN — Medication 2 MILLIGRAM(S): at 16:57

## 2025-01-01 RX ADMIN — Medication 40 MILLIGRAM(S): at 04:48

## 2025-01-01 RX ADMIN — DEXTROMETHORPHAN HBR, GUAIFENESIN 1200 MILLIGRAM(S): 200 LIQUID ORAL at 17:05

## 2025-01-01 RX ADMIN — Medication 2 MILLIGRAM(S): at 05:24

## 2025-01-01 RX ADMIN — CLOPIDOGREL BISULFATE 75 MILLIGRAM(S): 75 TABLET, FILM COATED ORAL at 11:29

## 2025-01-01 RX ADMIN — Medication 2 MILLIGRAM(S): at 18:00

## 2025-01-01 RX ADMIN — HYDROXYZINE HYDROCHLORIDE 25 MILLIGRAM(S): 25 TABLET, FILM COATED ORAL at 05:12

## 2025-01-01 RX ADMIN — Medication 1 TABLET(S): at 11:50

## 2025-01-01 RX ADMIN — Medication 2 MILLIGRAM(S): at 03:14

## 2025-01-01 RX ADMIN — OXYCODONE HYDROCHLORIDE 20 MILLIGRAM(S): 30 TABLET ORAL at 23:27

## 2025-01-01 RX ADMIN — IPRATROPIUM BROMIDE AND ALBUTEROL SULFATE 3 MILLILITER(S): .5; 2.5 SOLUTION RESPIRATORY (INHALATION) at 05:23

## 2025-01-01 RX ADMIN — CEFEPIME 100 MILLIGRAM(S): 2 INJECTION, POWDER, FOR SOLUTION INTRAVENOUS at 22:58

## 2025-01-01 RX ADMIN — DEXTROMETHORPHAN HBR, GUAIFENESIN 1200 MILLIGRAM(S): 200 LIQUID ORAL at 17:29

## 2025-01-01 RX ADMIN — Medication 2 TABLET(S): at 21:07

## 2025-01-01 RX ADMIN — Medication 1 LOZENGE: at 15:46

## 2025-01-01 RX ADMIN — Medication 1 TABLET(S): at 06:02

## 2025-01-01 RX ADMIN — Medication 400 MILLIGRAM(S): at 17:41

## 2025-01-01 RX ADMIN — HYDROXYZINE HYDROCHLORIDE 25 MILLIGRAM(S): 25 TABLET, FILM COATED ORAL at 21:38

## 2025-01-01 RX ADMIN — Medication 4 MILLIGRAM(S): at 00:01

## 2025-01-01 RX ADMIN — Medication 40 MILLIGRAM(S): at 06:19

## 2025-01-01 RX ADMIN — OXYCODONE HYDROCHLORIDE 20 MILLIGRAM(S): 30 TABLET ORAL at 08:07

## 2025-01-01 RX ADMIN — OXYCODONE HYDROCHLORIDE 30 MILLIGRAM(S): 30 TABLET ORAL at 05:56

## 2025-01-01 RX ADMIN — CLOPIDOGREL BISULFATE 75 MILLIGRAM(S): 75 TABLET, FILM COATED ORAL at 11:32

## 2025-01-01 RX ADMIN — MEROPENEM 100 MILLIGRAM(S): 1 INJECTION INTRAVENOUS at 18:38

## 2025-01-01 RX ADMIN — OXYCODONE HYDROCHLORIDE 30 MILLIGRAM(S): 30 TABLET ORAL at 18:37

## 2025-01-01 RX ADMIN — HEPARIN SODIUM 5000 UNIT(S): 1000 INJECTION INTRAVENOUS; SUBCUTANEOUS at 05:57

## 2025-01-01 RX ADMIN — MEROPENEM 100 MILLIGRAM(S): 1 INJECTION INTRAVENOUS at 17:54

## 2025-01-01 RX ADMIN — OXYCODONE HYDROCHLORIDE 30 MILLIGRAM(S): 30 TABLET ORAL at 05:12

## 2025-01-01 RX ADMIN — Medication 81 MILLIGRAM(S): at 11:32

## 2025-01-01 RX ADMIN — OXYCODONE HYDROCHLORIDE 30 MILLIGRAM(S): 30 TABLET ORAL at 05:35

## 2025-01-01 RX ADMIN — IPRATROPIUM BROMIDE AND ALBUTEROL SULFATE 3 MILLILITER(S): .5; 2.5 SOLUTION RESPIRATORY (INHALATION) at 11:18

## 2025-01-01 RX ADMIN — Medication 2 MILLIGRAM(S): at 06:41

## 2025-01-01 RX ADMIN — Medication 81 MILLIGRAM(S): at 12:53

## 2025-01-01 RX ADMIN — EPOETIN ALFA 10000 UNIT(S): 10000 SOLUTION INTRAVENOUS; SUBCUTANEOUS at 08:09

## 2025-01-01 RX ADMIN — SODIUM HYPOCHLORITE 1 APPLICATION(S): 0.12 SOLUTION TOPICAL at 17:01

## 2025-01-01 RX ADMIN — Medication 1 APPLICATION(S): at 11:13

## 2025-01-01 RX ADMIN — HYDROXYZINE HYDROCHLORIDE 25 MILLIGRAM(S): 25 TABLET, FILM COATED ORAL at 21:49

## 2025-01-01 RX ADMIN — Medication 4 MILLIGRAM(S): at 19:39

## 2025-01-01 RX ADMIN — HEPARIN SODIUM 5000 UNIT(S): 1000 INJECTION INTRAVENOUS; SUBCUTANEOUS at 23:12

## 2025-01-01 RX ADMIN — OXYCODONE HYDROCHLORIDE 30 MILLIGRAM(S): 30 TABLET ORAL at 17:03

## 2025-01-01 RX ADMIN — Medication 2 MILLIGRAM(S): at 03:22

## 2025-01-01 RX ADMIN — EPOETIN ALFA 10000 UNIT(S): 10000 SOLUTION INTRAVENOUS; SUBCUTANEOUS at 09:10

## 2025-01-01 RX ADMIN — Medication 2 MILLIGRAM(S): at 06:36

## 2025-01-01 RX ADMIN — OXYCODONE HYDROCHLORIDE 30 MILLIGRAM(S): 30 TABLET ORAL at 05:04

## 2025-01-01 RX ADMIN — MUPIROCIN CALCIUM 1 APPLICATION(S): 20 CREAM TOPICAL at 12:08

## 2025-01-01 RX ADMIN — Medication 400 MILLIGRAM(S): at 17:31

## 2025-01-01 RX ADMIN — OXYCODONE HYDROCHLORIDE 20 MILLIGRAM(S): 30 TABLET ORAL at 13:21

## 2025-01-01 RX ADMIN — OXYCODONE HYDROCHLORIDE 10 MILLIGRAM(S): 30 TABLET ORAL at 17:29

## 2025-01-01 RX ADMIN — Medication 400 MILLIGRAM(S): at 05:55

## 2025-01-01 RX ADMIN — Medication 81 MILLIGRAM(S): at 11:05

## 2025-01-01 RX ADMIN — HEPARIN SODIUM 5000 UNIT(S): 1000 INJECTION INTRAVENOUS; SUBCUTANEOUS at 17:16

## 2025-01-01 RX ADMIN — DAPTOMYCIN 136 MILLIGRAM(S): 500 INJECTION, POWDER, LYOPHILIZED, FOR SOLUTION INTRAVENOUS at 17:47

## 2025-01-01 RX ADMIN — HYDROXYZINE HYDROCHLORIDE 25 MILLIGRAM(S): 25 TABLET, FILM COATED ORAL at 23:45

## 2025-01-01 RX ADMIN — IPRATROPIUM BROMIDE AND ALBUTEROL SULFATE 3 MILLILITER(S): .5; 2.5 SOLUTION RESPIRATORY (INHALATION) at 05:35

## 2025-01-01 RX ADMIN — CLOPIDOGREL BISULFATE 75 MILLIGRAM(S): 75 TABLET, FILM COATED ORAL at 13:03

## 2025-01-01 RX ADMIN — ATORVASTATIN CALCIUM 40 MILLIGRAM(S): 80 TABLET, FILM COATED ORAL at 21:11

## 2025-01-01 RX ADMIN — OXYCODONE HYDROCHLORIDE 30 MILLIGRAM(S): 30 TABLET ORAL at 17:13

## 2025-01-01 RX ADMIN — BACITRACIN ZINC AND POLYMYXIN B SULFATE 1 APPLICATION(S): 500; 10000 OINTMENT TOPICAL at 17:28

## 2025-01-01 RX ADMIN — Medication 2 MILLIGRAM(S): at 21:05

## 2025-01-01 RX ADMIN — OXYCODONE HYDROCHLORIDE 20 MILLIGRAM(S): 30 TABLET ORAL at 13:58

## 2025-01-01 RX ADMIN — OXYCODONE HYDROCHLORIDE 10 MILLIGRAM(S): 30 TABLET ORAL at 18:31

## 2025-01-01 RX ADMIN — DEXTROMETHORPHAN HBR, GUAIFENESIN 1200 MILLIGRAM(S): 200 LIQUID ORAL at 06:05

## 2025-01-01 RX ADMIN — Medication 81 MILLIGRAM(S): at 13:03

## 2025-01-01 RX ADMIN — Medication 400 MILLIGRAM(S): at 17:08

## 2025-01-01 RX ADMIN — HYDROXYZINE HYDROCHLORIDE 25 MILLIGRAM(S): 25 TABLET, FILM COATED ORAL at 06:02

## 2025-01-01 RX ADMIN — DEXTROMETHORPHAN HBR, GUAIFENESIN 1200 MILLIGRAM(S): 200 LIQUID ORAL at 17:28

## 2025-01-01 RX ADMIN — HYDROXYZINE HYDROCHLORIDE 25 MILLIGRAM(S): 25 TABLET, FILM COATED ORAL at 14:39

## 2025-01-01 RX ADMIN — Medication 40 MILLIGRAM(S): at 05:36

## 2025-01-01 RX ADMIN — OXYCODONE HYDROCHLORIDE 10 MILLIGRAM(S): 30 TABLET ORAL at 01:23

## 2025-01-01 RX ADMIN — GABAPENTIN 300 MILLIGRAM(S): 400 CAPSULE ORAL at 23:25

## 2025-01-01 RX ADMIN — Medication 2 MILLIGRAM(S): at 22:20

## 2025-01-01 RX ADMIN — Medication 1 TABLET(S): at 14:36

## 2025-01-01 RX ADMIN — SODIUM HYPOCHLORITE 1 APPLICATION(S): 0.12 SOLUTION TOPICAL at 05:34

## 2025-01-01 RX ADMIN — Medication 1000 MILLIGRAM(S): at 13:00

## 2025-01-01 RX ADMIN — HEPARIN SODIUM 1100 UNIT(S)/HR: 1000 INJECTION INTRAVENOUS; SUBCUTANEOUS at 07:27

## 2025-01-01 RX ADMIN — Medication 2 MILLIGRAM(S): at 17:37

## 2025-01-01 RX ADMIN — Medication 40 MILLIGRAM(S): at 06:09

## 2025-01-01 RX ADMIN — Medication 1 TABLET(S): at 12:53

## 2025-01-01 RX ADMIN — Medication 1 TABLET(S): at 12:01

## 2025-01-01 RX ADMIN — Medication 1 APPLICATION(S): at 17:44

## 2025-01-01 RX ADMIN — Medication 4 MILLIGRAM(S): at 12:25

## 2025-01-01 RX ADMIN — Medication 2 MILLIGRAM(S): at 17:51

## 2025-01-01 RX ADMIN — Medication 40 MILLIGRAM(S): at 06:22

## 2025-01-01 RX ADMIN — Medication 1 TABLET(S): at 17:00

## 2025-01-01 RX ADMIN — OXYCODONE HYDROCHLORIDE 10 MILLIGRAM(S): 30 TABLET ORAL at 20:53

## 2025-01-01 RX ADMIN — KETOROLAC TROMETHAMINE 15 MILLIGRAM(S): 30 INJECTION, SOLUTION INTRAMUSCULAR; INTRAVENOUS at 10:43

## 2025-01-01 RX ADMIN — Medication 100 MILLIGRAM(S): at 06:15

## 2025-01-01 RX ADMIN — Medication 2 MILLIGRAM(S): at 06:06

## 2025-01-01 RX ADMIN — OXYCODONE HYDROCHLORIDE 30 MILLIGRAM(S): 30 TABLET ORAL at 17:00

## 2025-01-01 RX ADMIN — ACETYLCYSTEINE 4 MILLILITER(S): 200 INHALANT RESPIRATORY (INHALATION) at 05:19

## 2025-01-01 RX ADMIN — Medication 2001 MILLIGRAM(S): at 17:45

## 2025-01-01 RX ADMIN — DEXTROMETHORPHAN HBR, GUAIFENESIN 1200 MILLIGRAM(S): 200 LIQUID ORAL at 05:50

## 2025-01-01 RX ADMIN — HYDROXYZINE HYDROCHLORIDE 25 MILLIGRAM(S): 25 TABLET, FILM COATED ORAL at 05:25

## 2025-01-01 RX ADMIN — HEPARIN SODIUM 5000 UNIT(S): 1000 INJECTION INTRAVENOUS; SUBCUTANEOUS at 12:25

## 2025-01-01 RX ADMIN — CEFEPIME 100 MILLIGRAM(S): 2 INJECTION, POWDER, FOR SOLUTION INTRAVENOUS at 23:39

## 2025-01-01 RX ADMIN — OXYCODONE HYDROCHLORIDE 30 MILLIGRAM(S): 30 TABLET ORAL at 06:17

## 2025-01-01 RX ADMIN — HYDROXYZINE HYDROCHLORIDE 25 MILLIGRAM(S): 25 TABLET, FILM COATED ORAL at 22:08

## 2025-01-01 RX ADMIN — OXYCODONE HYDROCHLORIDE 10 MILLIGRAM(S): 30 TABLET ORAL at 04:54

## 2025-01-01 RX ADMIN — EPOETIN ALFA 10000 UNIT(S): 10000 SOLUTION INTRAVENOUS; SUBCUTANEOUS at 11:49

## 2025-01-01 RX ADMIN — OXYCODONE HYDROCHLORIDE 30 MILLIGRAM(S): 30 TABLET ORAL at 05:26

## 2025-01-01 RX ADMIN — HYDROXYZINE HYDROCHLORIDE 25 MILLIGRAM(S): 25 TABLET, FILM COATED ORAL at 06:50

## 2025-01-01 RX ADMIN — HYDROXYZINE HYDROCHLORIDE 25 MILLIGRAM(S): 25 TABLET, FILM COATED ORAL at 21:07

## 2025-01-01 RX ADMIN — Medication 100 MILLIGRAM(S): at 21:47

## 2025-01-01 RX ADMIN — Medication 650 MILLIGRAM(S): at 00:16

## 2025-01-01 RX ADMIN — Medication 2001 MILLIGRAM(S): at 18:54

## 2025-01-01 RX ADMIN — Medication 1 APPLICATION(S): at 11:31

## 2025-01-01 RX ADMIN — HYDROXYZINE HYDROCHLORIDE 25 MILLIGRAM(S): 25 TABLET, FILM COATED ORAL at 13:22

## 2025-01-01 RX ADMIN — OXYCODONE HYDROCHLORIDE 30 MILLIGRAM(S): 30 TABLET ORAL at 19:07

## 2025-01-01 RX ADMIN — DEXTROMETHORPHAN HBR, GUAIFENESIN 1200 MILLIGRAM(S): 200 LIQUID ORAL at 05:45

## 2025-01-01 RX ADMIN — IRON SUCROSE 210 MILLIGRAM(S): 20 INJECTION, SOLUTION INTRAVENOUS at 08:04

## 2025-01-01 RX ADMIN — OXYCODONE HYDROCHLORIDE 30 MILLIGRAM(S): 30 TABLET ORAL at 17:31

## 2025-01-01 RX ADMIN — OXYCODONE HYDROCHLORIDE 30 MILLIGRAM(S): 30 TABLET ORAL at 18:39

## 2025-01-01 RX ADMIN — Medication 400 MILLIGRAM(S): at 19:25

## 2025-01-01 RX ADMIN — SODIUM HYPOCHLORITE 1 APPLICATION(S): 0.12 SOLUTION TOPICAL at 22:44

## 2025-01-01 RX ADMIN — HEPARIN SODIUM 5000 UNIT(S): 1000 INJECTION INTRAVENOUS; SUBCUTANEOUS at 06:25

## 2025-01-01 RX ADMIN — Medication 81 MILLIGRAM(S): at 16:06

## 2025-01-01 RX ADMIN — IPRATROPIUM BROMIDE AND ALBUTEROL SULFATE 3 MILLILITER(S): .5; 2.5 SOLUTION RESPIRATORY (INHALATION) at 15:47

## 2025-01-01 RX ADMIN — Medication 2 MILLIGRAM(S): at 09:08

## 2025-01-01 RX ADMIN — Medication 2 MILLIGRAM(S): at 09:24

## 2025-01-01 RX ADMIN — Medication 2 MILLIGRAM(S): at 14:30

## 2025-01-01 RX ADMIN — Medication 1 APPLICATION(S): at 18:56

## 2025-01-01 RX ADMIN — CLOPIDOGREL BISULFATE 75 MILLIGRAM(S): 75 TABLET, FILM COATED ORAL at 14:35

## 2025-01-01 RX ADMIN — DEXTROMETHORPHAN HBR, GUAIFENESIN 1200 MILLIGRAM(S): 200 LIQUID ORAL at 04:52

## 2025-01-01 RX ADMIN — OXYCODONE HYDROCHLORIDE 10 MILLIGRAM(S): 30 TABLET ORAL at 12:47

## 2025-01-01 RX ADMIN — Medication 1 APPLICATION(S): at 06:29

## 2025-01-01 RX ADMIN — ATORVASTATIN CALCIUM 40 MILLIGRAM(S): 80 TABLET, FILM COATED ORAL at 21:40

## 2025-01-01 RX ADMIN — Medication 1334 MILLIGRAM(S): at 07:49

## 2025-01-01 RX ADMIN — OXYCODONE HYDROCHLORIDE 5 MILLIGRAM(S): 30 TABLET ORAL at 14:54

## 2025-01-01 RX ADMIN — MUPIROCIN CALCIUM 1 APPLICATION(S): 20 CREAM TOPICAL at 11:19

## 2025-01-01 RX ADMIN — HEPARIN SODIUM 1300 UNIT(S)/HR: 1000 INJECTION INTRAVENOUS; SUBCUTANEOUS at 14:28

## 2025-01-01 RX ADMIN — OXYCODONE HYDROCHLORIDE 30 MILLIGRAM(S): 30 TABLET ORAL at 17:01

## 2025-01-01 RX ADMIN — HEPARIN SODIUM 1300 UNIT(S)/HR: 1000 INJECTION INTRAVENOUS; SUBCUTANEOUS at 09:45

## 2025-01-01 RX ADMIN — OXYCODONE HYDROCHLORIDE 10 MILLIGRAM(S): 30 TABLET ORAL at 18:54

## 2025-01-01 RX ADMIN — Medication 1 TABLET(S): at 17:41

## 2025-01-01 RX ADMIN — ATORVASTATIN CALCIUM 40 MILLIGRAM(S): 80 TABLET, FILM COATED ORAL at 21:21

## 2025-01-01 RX ADMIN — Medication 40 MILLIGRAM(S): at 05:37

## 2025-01-01 RX ADMIN — POLYETHYLENE GLYCOL 3350 17 GRAM(S): 17 POWDER, FOR SOLUTION ORAL at 12:11

## 2025-01-01 RX ADMIN — CLOPIDOGREL BISULFATE 75 MILLIGRAM(S): 75 TABLET, FILM COATED ORAL at 12:48

## 2025-01-01 RX ADMIN — SODIUM HYPOCHLORITE 1 APPLICATION(S): 0.12 SOLUTION TOPICAL at 21:39

## 2025-01-01 RX ADMIN — Medication 2 TABLET(S): at 22:08

## 2025-01-01 RX ADMIN — OXYCODONE HYDROCHLORIDE 10 MILLIGRAM(S): 30 TABLET ORAL at 09:30

## 2025-01-01 RX ADMIN — OXYCODONE HYDROCHLORIDE 10 MILLIGRAM(S): 30 TABLET ORAL at 00:15

## 2025-01-01 RX ADMIN — OXYCODONE HYDROCHLORIDE 10 MILLIGRAM(S): 30 TABLET ORAL at 16:30

## 2025-01-01 RX ADMIN — METHOCARBAMOL 750 MILLIGRAM(S): 500 TABLET, FILM COATED ORAL at 16:50

## 2025-01-01 RX ADMIN — Medication 1 APPLICATION(S): at 11:34

## 2025-01-01 RX ADMIN — Medication 400 MILLIGRAM(S): at 17:30

## 2025-01-01 RX ADMIN — Medication 1334 MILLIGRAM(S): at 10:40

## 2025-01-01 RX ADMIN — Medication 2 MILLIGRAM(S): at 16:32

## 2025-01-01 RX ADMIN — HEPARIN SODIUM 1300 UNIT(S)/HR: 1000 INJECTION INTRAVENOUS; SUBCUTANEOUS at 20:21

## 2025-01-01 RX ADMIN — OXYCODONE HYDROCHLORIDE 10 MILLIGRAM(S): 30 TABLET ORAL at 14:05

## 2025-01-01 RX ADMIN — MUPIROCIN CALCIUM 1 APPLICATION(S): 20 CREAM TOPICAL at 10:00

## 2025-01-01 RX ADMIN — Medication 2 MILLIGRAM(S): at 10:31

## 2025-01-01 RX ADMIN — Medication 1 MILLIGRAM(S): at 21:30

## 2025-01-01 RX ADMIN — Medication 1 LOZENGE: at 19:50

## 2025-01-01 RX ADMIN — Medication 81 MILLIGRAM(S): at 11:31

## 2025-01-01 RX ADMIN — Medication 1 APPLICATION(S): at 13:12

## 2025-01-01 RX ADMIN — Medication 650 MILLIGRAM(S): at 21:44

## 2025-01-01 RX ADMIN — Medication 400 MILLIGRAM(S): at 06:38

## 2025-01-01 RX ADMIN — ATORVASTATIN CALCIUM 40 MILLIGRAM(S): 80 TABLET, FILM COATED ORAL at 21:32

## 2025-01-01 RX ADMIN — OXYCODONE HYDROCHLORIDE 30 MILLIGRAM(S): 30 TABLET ORAL at 18:41

## 2025-01-01 RX ADMIN — MEROPENEM 100 MILLIGRAM(S): 1 INJECTION INTRAVENOUS at 05:50

## 2025-01-01 RX ADMIN — Medication 400 MILLIGRAM(S): at 17:10

## 2025-01-01 RX ADMIN — OXYCODONE HYDROCHLORIDE 30 MILLIGRAM(S): 30 TABLET ORAL at 05:02

## 2025-01-01 RX ADMIN — Medication 40 MILLIGRAM(S): at 08:52

## 2025-01-01 RX ADMIN — HYDROXYZINE HYDROCHLORIDE 25 MILLIGRAM(S): 25 TABLET, FILM COATED ORAL at 13:34

## 2025-01-01 RX ADMIN — EPOETIN ALFA 10000 UNIT(S): 10000 SOLUTION INTRAVENOUS; SUBCUTANEOUS at 18:09

## 2025-01-01 RX ADMIN — HYDROXYZINE HYDROCHLORIDE 25 MILLIGRAM(S): 25 TABLET, FILM COATED ORAL at 05:17

## 2025-01-01 RX ADMIN — Medication 1 APPLICATION(S): at 05:02

## 2025-01-01 RX ADMIN — CLOPIDOGREL BISULFATE 75 MILLIGRAM(S): 75 TABLET, FILM COATED ORAL at 16:04

## 2025-01-01 RX ADMIN — Medication 1 APPLICATION(S): at 12:10

## 2025-01-01 RX ADMIN — Medication 0.5 MILLIGRAM(S): at 23:57

## 2025-01-01 RX ADMIN — Medication 2 MILLIGRAM(S): at 12:10

## 2025-01-01 RX ADMIN — Medication 400 MILLIGRAM(S): at 17:34

## 2025-01-01 RX ADMIN — Medication 40 MILLIGRAM(S): at 09:44

## 2025-01-01 RX ADMIN — Medication 1 APPLICATION(S): at 17:23

## 2025-01-01 RX ADMIN — Medication 2 MILLIGRAM(S): at 14:51

## 2025-01-01 RX ADMIN — OXYCODONE HYDROCHLORIDE 30 MILLIGRAM(S): 30 TABLET ORAL at 05:22

## 2025-01-01 RX ADMIN — CLOPIDOGREL BISULFATE 75 MILLIGRAM(S): 75 TABLET, FILM COATED ORAL at 11:06

## 2025-01-01 RX ADMIN — HEPARIN SODIUM 5000 UNIT(S): 1000 INJECTION INTRAVENOUS; SUBCUTANEOUS at 14:25

## 2025-01-01 RX ADMIN — LACTULOSE 20 GRAM(S): 10 SOLUTION ORAL at 09:23

## 2025-01-01 RX ADMIN — Medication 2 MILLIGRAM(S): at 23:56

## 2025-01-01 RX ADMIN — Medication 1 APPLICATION(S): at 11:44

## 2025-01-01 RX ADMIN — SODIUM HYPOCHLORITE 1 APPLICATION(S): 0.12 SOLUTION TOPICAL at 05:19

## 2025-01-01 RX ADMIN — METHOCARBAMOL 750 MILLIGRAM(S): 500 TABLET, FILM COATED ORAL at 21:25

## 2025-01-01 RX ADMIN — MUPIROCIN CALCIUM 1 APPLICATION(S): 20 CREAM TOPICAL at 12:16

## 2025-01-01 RX ADMIN — OXYCODONE HYDROCHLORIDE 10 MILLIGRAM(S): 30 TABLET ORAL at 18:15

## 2025-01-01 RX ADMIN — Medication 400 MILLIGRAM(S): at 21:44

## 2025-01-01 RX ADMIN — Medication 0.5 MILLIGRAM(S): at 16:15

## 2025-01-01 RX ADMIN — OXYCODONE HYDROCHLORIDE 30 MILLIGRAM(S): 30 TABLET ORAL at 21:18

## 2025-01-01 RX ADMIN — Medication 1 TABLET(S): at 16:49

## 2025-01-01 RX ADMIN — MEROPENEM 100 MILLIGRAM(S): 1 INJECTION INTRAVENOUS at 05:56

## 2025-01-01 RX ADMIN — Medication 1 APPLICATION(S): at 07:08

## 2025-01-01 RX ADMIN — Medication 2 MILLIGRAM(S): at 13:47

## 2025-01-01 RX ADMIN — SODIUM ZIRCONIUM CYCLOSILICATE 10 GRAM(S): 5 POWDER, FOR SUSPENSION ORAL at 23:44

## 2025-01-01 RX ADMIN — DEXTROMETHORPHAN HBR, GUAIFENESIN 1200 MILLIGRAM(S): 200 LIQUID ORAL at 09:44

## 2025-01-01 RX ADMIN — ATORVASTATIN CALCIUM 40 MILLIGRAM(S): 80 TABLET, FILM COATED ORAL at 22:26

## 2025-01-01 RX ADMIN — Medication 1 APPLICATION(S): at 05:53

## 2025-01-01 RX ADMIN — OXYCODONE HYDROCHLORIDE 20 MILLIGRAM(S): 30 TABLET ORAL at 07:01

## 2025-01-01 RX ADMIN — HEPARIN SODIUM 5000 UNIT(S): 1000 INJECTION INTRAVENOUS; SUBCUTANEOUS at 05:09

## 2025-01-01 RX ADMIN — Medication 400 MILLIGRAM(S): at 18:21

## 2025-01-01 RX ADMIN — Medication 2 MILLIGRAM(S): at 21:13

## 2025-01-01 RX ADMIN — Medication 2 MILLIGRAM(S): at 12:07

## 2025-01-01 RX ADMIN — IPRATROPIUM BROMIDE AND ALBUTEROL SULFATE 3 MILLILITER(S): .5; 2.5 SOLUTION RESPIRATORY (INHALATION) at 11:30

## 2025-01-01 RX ADMIN — DEXTROMETHORPHAN HBR, GUAIFENESIN 1200 MILLIGRAM(S): 200 LIQUID ORAL at 06:19

## 2025-01-01 RX ADMIN — HYDROXYZINE HYDROCHLORIDE 25 MILLIGRAM(S): 25 TABLET, FILM COATED ORAL at 14:51

## 2025-01-01 RX ADMIN — IPRATROPIUM BROMIDE AND ALBUTEROL SULFATE 3 MILLILITER(S): .5; 2.5 SOLUTION RESPIRATORY (INHALATION) at 12:15

## 2025-01-01 RX ADMIN — SODIUM HYPOCHLORITE 1 APPLICATION(S): 0.12 SOLUTION TOPICAL at 14:36

## 2025-01-01 RX ADMIN — OXYCODONE HYDROCHLORIDE 30 MILLIGRAM(S): 30 TABLET ORAL at 18:24

## 2025-01-01 RX ADMIN — Medication 2 MILLIGRAM(S): at 12:54

## 2025-01-01 RX ADMIN — Medication 2 TABLET(S): at 23:27

## 2025-01-01 RX ADMIN — MUPIROCIN CALCIUM 1 APPLICATION(S): 20 CREAM TOPICAL at 08:41

## 2025-01-01 RX ADMIN — OXYCODONE HYDROCHLORIDE 10 MILLIGRAM(S): 30 TABLET ORAL at 12:51

## 2025-01-01 RX ADMIN — HEPARIN SODIUM 5000 UNIT(S): 1000 INJECTION INTRAVENOUS; SUBCUTANEOUS at 05:24

## 2025-01-01 RX ADMIN — CLOPIDOGREL BISULFATE 75 MILLIGRAM(S): 75 TABLET, FILM COATED ORAL at 13:19

## 2025-01-01 RX ADMIN — HYDROXYZINE HYDROCHLORIDE 25 MILLIGRAM(S): 25 TABLET, FILM COATED ORAL at 15:01

## 2025-01-01 RX ADMIN — CLOPIDOGREL BISULFATE 300 MILLIGRAM(S): 75 TABLET, FILM COATED ORAL at 16:02

## 2025-01-01 RX ADMIN — Medication 2 MILLIGRAM(S): at 17:50

## 2025-01-01 RX ADMIN — Medication 1 APPLICATION(S): at 12:05

## 2025-01-01 RX ADMIN — OXYCODONE HYDROCHLORIDE 10 MILLIGRAM(S): 30 TABLET ORAL at 12:54

## 2025-01-01 RX ADMIN — ACETYLCYSTEINE 4 MILLILITER(S): 200 INHALANT RESPIRATORY (INHALATION) at 13:37

## 2025-01-01 RX ADMIN — Medication 2 MILLIGRAM(S): at 21:50

## 2025-01-01 RX ADMIN — Medication 2001 MILLIGRAM(S): at 08:10

## 2025-01-01 RX ADMIN — Medication 400 MILLIGRAM(S): at 12:30

## 2025-01-01 RX ADMIN — HEPARIN SODIUM 5000 UNIT(S): 1000 INJECTION INTRAVENOUS; SUBCUTANEOUS at 05:43

## 2025-01-01 RX ADMIN — HYDROXYZINE HYDROCHLORIDE 25 MILLIGRAM(S): 25 TABLET, FILM COATED ORAL at 22:20

## 2025-01-01 RX ADMIN — HEPARIN SODIUM 200 UNIT(S)/HR: 1000 INJECTION INTRAVENOUS; SUBCUTANEOUS at 19:41

## 2025-01-01 RX ADMIN — Medication 2001 MILLIGRAM(S): at 11:47

## 2025-01-01 RX ADMIN — HEPARIN SODIUM 5000 UNIT(S): 1000 INJECTION INTRAVENOUS; SUBCUTANEOUS at 13:23

## 2025-01-01 RX ADMIN — Medication 1 TABLET(S): at 17:22

## 2025-01-01 RX ADMIN — HEPARIN SODIUM 5000 UNIT(S): 1000 INJECTION INTRAVENOUS; SUBCUTANEOUS at 21:58

## 2025-01-01 RX ADMIN — ATORVASTATIN CALCIUM 40 MILLIGRAM(S): 80 TABLET, FILM COATED ORAL at 21:15

## 2025-01-01 RX ADMIN — HEPARIN SODIUM 1300 UNIT(S)/HR: 1000 INJECTION INTRAVENOUS; SUBCUTANEOUS at 23:03

## 2025-01-01 RX ADMIN — KETOCONAZOLE 1 APPLICATION(S): 20 CREAM TOPICAL at 11:08

## 2025-01-01 RX ADMIN — DEXTROMETHORPHAN HBR, GUAIFENESIN 1200 MILLIGRAM(S): 200 LIQUID ORAL at 18:06

## 2025-01-01 RX ADMIN — Medication 25 GRAM(S): at 12:14

## 2025-01-01 RX ADMIN — SODIUM HYPOCHLORITE 1 APPLICATION(S): 0.12 SOLUTION TOPICAL at 21:26

## 2025-01-01 RX ADMIN — OXYCODONE HYDROCHLORIDE 10 MILLIGRAM(S): 30 TABLET ORAL at 13:22

## 2025-01-01 RX ADMIN — HYDROXYZINE HYDROCHLORIDE 25 MILLIGRAM(S): 25 TABLET, FILM COATED ORAL at 05:30

## 2025-01-01 RX ADMIN — HEPARIN SODIUM 5000 UNIT(S): 1000 INJECTION INTRAVENOUS; SUBCUTANEOUS at 05:04

## 2025-01-01 RX ADMIN — Medication 400 MILLIGRAM(S): at 05:33

## 2025-01-01 RX ADMIN — OXYCODONE HYDROCHLORIDE 10 MILLIGRAM(S): 30 TABLET ORAL at 15:21

## 2025-01-01 RX ADMIN — Medication 400 MILLIGRAM(S): at 05:02

## 2025-01-01 RX ADMIN — OXYCODONE HYDROCHLORIDE 10 MILLIGRAM(S): 30 TABLET ORAL at 11:50

## 2025-01-01 RX ADMIN — Medication 81 MILLIGRAM(S): at 12:15

## 2025-01-01 RX ADMIN — LIDOCAINE HYDROCHLORIDE 1 PATCH: 20 JELLY TOPICAL at 09:04

## 2025-01-01 RX ADMIN — HYDROXYZINE HYDROCHLORIDE 25 MILLIGRAM(S): 25 TABLET, FILM COATED ORAL at 00:54

## 2025-01-01 RX ADMIN — SODIUM HYPOCHLORITE 1 APPLICATION(S): 0.12 SOLUTION TOPICAL at 05:09

## 2025-01-01 RX ADMIN — OXYCODONE HYDROCHLORIDE 30 MILLIGRAM(S): 30 TABLET ORAL at 18:33

## 2025-01-01 RX ADMIN — OXYCODONE HYDROCHLORIDE 10 MILLIGRAM(S): 30 TABLET ORAL at 13:24

## 2025-01-01 RX ADMIN — OXYCODONE HYDROCHLORIDE 10 MILLIGRAM(S): 30 TABLET ORAL at 22:09

## 2025-01-01 RX ADMIN — CLOPIDOGREL BISULFATE 75 MILLIGRAM(S): 75 TABLET, FILM COATED ORAL at 18:25

## 2025-01-01 RX ADMIN — OXYCODONE HYDROCHLORIDE 10 MILLIGRAM(S): 30 TABLET ORAL at 08:55

## 2025-01-01 RX ADMIN — BACITRACIN ZINC AND POLYMYXIN B SULFATE 1 APPLICATION(S): 500; 10000 OINTMENT TOPICAL at 08:55

## 2025-01-01 RX ADMIN — Medication 2 MILLIGRAM(S): at 21:42

## 2025-01-01 RX ADMIN — Medication 2 MILLIGRAM(S): at 02:21

## 2025-01-01 RX ADMIN — HEPARIN SODIUM 1300 UNIT(S)/HR: 1000 INJECTION INTRAVENOUS; SUBCUTANEOUS at 08:00

## 2025-01-01 RX ADMIN — Medication 2001 MILLIGRAM(S): at 12:15

## 2025-01-01 RX ADMIN — DEXTROMETHORPHAN HBR, GUAIFENESIN 1200 MILLIGRAM(S): 200 LIQUID ORAL at 05:34

## 2025-01-01 RX ADMIN — IPRATROPIUM BROMIDE AND ALBUTEROL SULFATE 3 MILLILITER(S): .5; 2.5 SOLUTION RESPIRATORY (INHALATION) at 05:19

## 2025-01-01 RX ADMIN — DEXTROMETHORPHAN HBR, GUAIFENESIN 1200 MILLIGRAM(S): 200 LIQUID ORAL at 05:14

## 2025-01-01 RX ADMIN — Medication 2 MILLIGRAM(S): at 20:59

## 2025-01-01 RX ADMIN — METHOCARBAMOL 750 MILLIGRAM(S): 500 TABLET, FILM COATED ORAL at 14:25

## 2025-01-01 RX ADMIN — METHOCARBAMOL 750 MILLIGRAM(S): 500 TABLET, FILM COATED ORAL at 21:40

## 2025-01-01 RX ADMIN — GLYCOPYRROLATE 0.4 MILLIGRAM(S): 0.2 INJECTION INTRAMUSCULAR; INTRAVENOUS at 12:46

## 2025-01-01 RX ADMIN — OXYCODONE HYDROCHLORIDE 10 MILLIGRAM(S): 30 TABLET ORAL at 14:31

## 2025-01-01 RX ADMIN — Medication 2 MILLIGRAM(S): at 10:03

## 2025-01-01 RX ADMIN — Medication 650 MILLIGRAM(S): at 01:30

## 2025-01-01 RX ADMIN — HYDROXYZINE HYDROCHLORIDE 25 MILLIGRAM(S): 25 TABLET, FILM COATED ORAL at 16:06

## 2025-01-01 RX ADMIN — Medication 1 APPLICATION(S): at 05:12

## 2025-01-01 RX ADMIN — BACITRACIN ZINC AND POLYMYXIN B SULFATE 1 APPLICATION(S): 500; 10000 OINTMENT TOPICAL at 06:25

## 2025-01-01 RX ADMIN — OXYCODONE HYDROCHLORIDE 30 MILLIGRAM(S): 30 TABLET ORAL at 22:02

## 2025-01-01 RX ADMIN — HEPARIN SODIUM 0 UNIT(S)/HR: 1000 INJECTION INTRAVENOUS; SUBCUTANEOUS at 11:44

## 2025-01-01 RX ADMIN — ATORVASTATIN CALCIUM 40 MILLIGRAM(S): 80 TABLET, FILM COATED ORAL at 00:16

## 2025-01-01 RX ADMIN — HYDROXYZINE HYDROCHLORIDE 25 MILLIGRAM(S): 25 TABLET, FILM COATED ORAL at 22:18

## 2025-01-01 RX ADMIN — Medication 400 MILLIGRAM(S): at 07:08

## 2025-01-01 RX ADMIN — Medication 1 TABLET(S): at 05:50

## 2025-01-01 RX ADMIN — Medication 1 APPLICATION(S): at 12:34

## 2025-01-01 RX ADMIN — Medication 2 MILLIGRAM(S): at 13:49

## 2025-01-01 RX ADMIN — OXYCODONE HYDROCHLORIDE 10 MILLIGRAM(S): 30 TABLET ORAL at 08:50

## 2025-01-01 RX ADMIN — Medication 1 TABLET(S): at 18:09

## 2025-01-01 RX ADMIN — SODIUM HYPOCHLORITE 1 APPLICATION(S): 0.12 SOLUTION TOPICAL at 22:19

## 2025-01-01 RX ADMIN — HEPARIN SODIUM 5000 UNIT(S): 1000 INJECTION INTRAVENOUS; SUBCUTANEOUS at 14:13

## 2025-01-01 RX ADMIN — Medication 1 APPLICATION(S): at 11:11

## 2025-01-01 RX ADMIN — HEPARIN SODIUM 5000 UNIT(S): 1000 INJECTION INTRAVENOUS; SUBCUTANEOUS at 06:22

## 2025-01-01 RX ADMIN — IPRATROPIUM BROMIDE AND ALBUTEROL SULFATE 3 MILLILITER(S): .5; 2.5 SOLUTION RESPIRATORY (INHALATION) at 11:01

## 2025-01-01 RX ADMIN — HYDROXYZINE HYDROCHLORIDE 25 MILLIGRAM(S): 25 TABLET, FILM COATED ORAL at 05:02

## 2025-01-01 RX ADMIN — HYDROXYZINE HYDROCHLORIDE 25 MILLIGRAM(S): 25 TABLET, FILM COATED ORAL at 13:21

## 2025-01-01 RX ADMIN — Medication 1 APPLICATION(S): at 11:51

## 2025-01-01 RX ADMIN — HEPARIN SODIUM 0 UNIT(S)/HR: 1000 INJECTION INTRAVENOUS; SUBCUTANEOUS at 22:04

## 2025-01-01 RX ADMIN — ATORVASTATIN CALCIUM 40 MILLIGRAM(S): 80 TABLET, FILM COATED ORAL at 21:42

## 2025-01-01 RX ADMIN — OXYCODONE HYDROCHLORIDE 10 MILLIGRAM(S): 30 TABLET ORAL at 10:39

## 2025-01-01 RX ADMIN — Medication 0.2 MILLIGRAM(S): at 10:39

## 2025-01-01 RX ADMIN — Medication 1 MILLIGRAM(S): at 15:56

## 2025-01-01 RX ADMIN — OXYCODONE HYDROCHLORIDE 30 MILLIGRAM(S): 30 TABLET ORAL at 05:01

## 2025-01-01 RX ADMIN — BACITRACIN ZINC AND POLYMYXIN B SULFATE 1 APPLICATION(S): 500; 10000 OINTMENT TOPICAL at 17:31

## 2025-01-01 RX ADMIN — ATORVASTATIN CALCIUM 40 MILLIGRAM(S): 80 TABLET, FILM COATED ORAL at 21:00

## 2025-01-01 RX ADMIN — OXYCODONE HYDROCHLORIDE 10 MILLIGRAM(S): 30 TABLET ORAL at 02:06

## 2025-01-01 RX ADMIN — Medication 400 MILLIGRAM(S): at 01:30

## 2025-01-01 RX ADMIN — MUPIROCIN CALCIUM 1 APPLICATION(S): 20 CREAM TOPICAL at 12:11

## 2025-01-01 RX ADMIN — Medication 2 TABLET(S): at 21:04

## 2025-01-01 RX ADMIN — OXYCODONE HYDROCHLORIDE 10 MILLIGRAM(S): 30 TABLET ORAL at 12:25

## 2025-01-01 RX ADMIN — EPOETIN ALFA 10000 UNIT(S): 10000 SOLUTION INTRAVENOUS; SUBCUTANEOUS at 13:43

## 2025-01-01 RX ADMIN — Medication 2 MILLIGRAM(S): at 10:06

## 2025-01-01 RX ADMIN — MUPIROCIN CALCIUM 1 APPLICATION(S): 20 CREAM TOPICAL at 09:52

## 2025-01-01 RX ADMIN — Medication 0.5 MILLIGRAM(S): at 08:17

## 2025-01-01 RX ADMIN — Medication 81 MILLIGRAM(S): at 16:02

## 2025-01-01 RX ADMIN — HYDROXYZINE HYDROCHLORIDE 25 MILLIGRAM(S): 25 TABLET, FILM COATED ORAL at 05:44

## 2025-01-01 RX ADMIN — HEPARIN SODIUM 5000 UNIT(S): 1000 INJECTION INTRAVENOUS; SUBCUTANEOUS at 22:58

## 2025-01-01 RX ADMIN — Medication 1 MILLIGRAM(S): at 10:17

## 2025-01-01 RX ADMIN — Medication 1 APPLICATION(S): at 18:37

## 2025-01-01 RX ADMIN — Medication 1 TABLET(S): at 08:05

## 2025-01-01 RX ADMIN — Medication 81 MILLIGRAM(S): at 12:05

## 2025-01-01 RX ADMIN — OXYCODONE HYDROCHLORIDE 30 MILLIGRAM(S): 30 TABLET ORAL at 05:43

## 2025-01-01 RX ADMIN — Medication 40 MILLIGRAM(S): at 05:34

## 2025-01-01 RX ADMIN — OXYCODONE HYDROCHLORIDE 30 MILLIGRAM(S): 30 TABLET ORAL at 18:40

## 2025-01-01 RX ADMIN — MUPIROCIN CALCIUM 1 APPLICATION(S): 20 CREAM TOPICAL at 13:04

## 2025-01-01 RX ADMIN — SODIUM HYPOCHLORITE 1 APPLICATION(S): 0.12 SOLUTION TOPICAL at 13:22

## 2025-01-01 RX ADMIN — OXYCODONE HYDROCHLORIDE 30 MILLIGRAM(S): 30 TABLET ORAL at 17:19

## 2025-01-01 RX ADMIN — MUPIROCIN CALCIUM 1 APPLICATION(S): 20 CREAM TOPICAL at 17:51

## 2025-01-01 RX ADMIN — Medication 2 MILLIGRAM(S): at 12:11

## 2025-01-01 RX ADMIN — Medication 2 MILLIGRAM(S): at 08:27

## 2025-01-01 RX ADMIN — Medication 400 MILLIGRAM(S): at 05:35

## 2025-01-01 RX ADMIN — HYDROXYZINE HYDROCHLORIDE 25 MILLIGRAM(S): 25 TABLET, FILM COATED ORAL at 14:23

## 2025-01-01 RX ADMIN — OXYCODONE HYDROCHLORIDE 30 MILLIGRAM(S): 30 TABLET ORAL at 18:01

## 2025-01-01 RX ADMIN — Medication 40 MILLIGRAM(S): at 04:54

## 2025-01-01 RX ADMIN — Medication 100 MILLIGRAM(S): at 14:14

## 2025-01-01 RX ADMIN — IPRATROPIUM BROMIDE AND ALBUTEROL SULFATE 3 MILLILITER(S): .5; 2.5 SOLUTION RESPIRATORY (INHALATION) at 00:16

## 2025-01-01 RX ADMIN — EPOETIN ALFA 10000 UNIT(S): 10000 SOLUTION INTRAVENOUS; SUBCUTANEOUS at 08:04

## 2025-01-01 RX ADMIN — EPOETIN ALFA 10000 UNIT(S): 10000 SOLUTION INTRAVENOUS; SUBCUTANEOUS at 09:57

## 2025-01-01 RX ADMIN — Medication 5 MILLIGRAM(S): at 21:19

## 2025-01-01 RX ADMIN — Medication 400 MILLIGRAM(S): at 07:01

## 2025-01-01 RX ADMIN — POLYETHYLENE GLYCOL 3350 17 GRAM(S): 17 POWDER, FOR SOLUTION ORAL at 18:25

## 2025-01-01 RX ADMIN — OXYCODONE HYDROCHLORIDE 30 MILLIGRAM(S): 30 TABLET ORAL at 06:37

## 2025-01-01 RX ADMIN — DAPTOMYCIN 136 MILLIGRAM(S): 500 INJECTION, POWDER, LYOPHILIZED, FOR SOLUTION INTRAVENOUS at 18:07

## 2025-01-01 RX ADMIN — IPRATROPIUM BROMIDE AND ALBUTEROL SULFATE 3 MILLILITER(S): .5; 2.5 SOLUTION RESPIRATORY (INHALATION) at 19:32

## 2025-01-01 RX ADMIN — HEPARIN SODIUM 1400 UNIT(S)/HR: 1000 INJECTION INTRAVENOUS; SUBCUTANEOUS at 23:48

## 2025-01-01 RX ADMIN — Medication 1 APPLICATION(S): at 07:05

## 2025-01-01 RX ADMIN — Medication 2 MILLIGRAM(S): at 23:02

## 2025-01-01 RX ADMIN — Medication 4 MILLIGRAM(S): at 15:53

## 2025-01-01 RX ADMIN — Medication 1 APPLICATION(S): at 18:00

## 2025-01-01 RX ADMIN — Medication 2 MILLIGRAM(S): at 02:19

## 2025-01-01 RX ADMIN — IPRATROPIUM BROMIDE AND ALBUTEROL SULFATE 3 MILLILITER(S): .5; 2.5 SOLUTION RESPIRATORY (INHALATION) at 13:35

## 2025-01-01 RX ADMIN — OXYCODONE HYDROCHLORIDE 30 MILLIGRAM(S): 30 TABLET ORAL at 05:52

## 2025-01-01 RX ADMIN — Medication 2 MILLIGRAM(S): at 10:33

## 2025-01-01 RX ADMIN — DEXTROMETHORPHAN HBR, GUAIFENESIN 1200 MILLIGRAM(S): 200 LIQUID ORAL at 06:12

## 2025-01-01 RX ADMIN — HEPARIN SODIUM 5000 UNIT(S): 1000 INJECTION INTRAVENOUS; SUBCUTANEOUS at 21:25

## 2025-01-01 RX ADMIN — OXYCODONE HYDROCHLORIDE 30 MILLIGRAM(S): 30 TABLET ORAL at 04:55

## 2025-01-01 RX ADMIN — Medication 1 MILLIGRAM(S): at 06:23

## 2025-01-01 RX ADMIN — Medication 1334 MILLIGRAM(S): at 11:01

## 2025-01-01 RX ADMIN — Medication 400 MILLIGRAM(S): at 06:42

## 2025-01-01 RX ADMIN — Medication 1 TABLET(S): at 13:00

## 2025-01-01 RX ADMIN — Medication 400 MILLIGRAM(S): at 08:06

## 2025-01-01 RX ADMIN — Medication 4 MILLIGRAM(S): at 20:40

## 2025-01-01 RX ADMIN — ACETYLCYSTEINE 4 MILLILITER(S): 200 INHALANT RESPIRATORY (INHALATION) at 00:31

## 2025-01-01 RX ADMIN — POLYETHYLENE GLYCOL 3350 17 GRAM(S): 17 POWDER, FOR SOLUTION ORAL at 11:37

## 2025-01-01 RX ADMIN — DEXTROMETHORPHAN HBR, GUAIFENESIN 1200 MILLIGRAM(S): 200 LIQUID ORAL at 05:56

## 2025-01-01 RX ADMIN — HYDROXYZINE HYDROCHLORIDE 25 MILLIGRAM(S): 25 TABLET, FILM COATED ORAL at 05:57

## 2025-01-01 RX ADMIN — Medication 1 MILLIGRAM(S): at 16:51

## 2025-01-01 RX ADMIN — HYDROXYZINE HYDROCHLORIDE 25 MILLIGRAM(S): 25 TABLET, FILM COATED ORAL at 13:18

## 2025-01-01 RX ADMIN — Medication 1 APPLICATION(S): at 05:01

## 2025-01-01 RX ADMIN — HYDROXYZINE HYDROCHLORIDE 25 MILLIGRAM(S): 25 TABLET, FILM COATED ORAL at 17:00

## 2025-01-01 RX ADMIN — CLOPIDOGREL BISULFATE 75 MILLIGRAM(S): 75 TABLET, FILM COATED ORAL at 13:00

## 2025-01-01 RX ADMIN — HYDROXYZINE HYDROCHLORIDE 25 MILLIGRAM(S): 25 TABLET, FILM COATED ORAL at 05:36

## 2025-01-01 RX ADMIN — Medication 1 TABLET(S): at 12:05

## 2025-01-01 RX ADMIN — OXYCODONE HYDROCHLORIDE 30 MILLIGRAM(S): 30 TABLET ORAL at 17:57

## 2025-01-01 RX ADMIN — Medication 2 MILLIGRAM(S): at 23:00

## 2025-01-01 RX ADMIN — DEXTROMETHORPHAN HBR, GUAIFENESIN 1200 MILLIGRAM(S): 200 LIQUID ORAL at 14:13

## 2025-01-01 RX ADMIN — CLOPIDOGREL BISULFATE 75 MILLIGRAM(S): 75 TABLET, FILM COATED ORAL at 16:08

## 2025-01-01 RX ADMIN — HEPARIN SODIUM 5000 UNIT(S): 1000 INJECTION INTRAVENOUS; SUBCUTANEOUS at 05:23

## 2025-01-01 RX ADMIN — SODIUM HYPOCHLORITE 1 APPLICATION(S): 0.12 SOLUTION TOPICAL at 05:52

## 2025-01-01 RX ADMIN — SODIUM HYPOCHLORITE 1 APPLICATION(S): 0.12 SOLUTION TOPICAL at 21:14

## 2025-01-01 RX ADMIN — BACITRACIN ZINC AND POLYMYXIN B SULFATE 1 APPLICATION(S): 500; 10000 OINTMENT TOPICAL at 18:56

## 2025-01-01 RX ADMIN — Medication 1 APPLICATION(S): at 06:12

## 2025-01-01 RX ADMIN — OXYCODONE HYDROCHLORIDE 10 MILLIGRAM(S): 30 TABLET ORAL at 02:23

## 2025-01-01 RX ADMIN — Medication 200 GRAM(S): at 18:15

## 2025-01-01 RX ADMIN — GABAPENTIN 50 MILLIGRAM(S): 400 CAPSULE ORAL at 22:15

## 2025-01-01 RX ADMIN — HYDROXYZINE HYDROCHLORIDE 25 MILLIGRAM(S): 25 TABLET, FILM COATED ORAL at 14:37

## 2025-01-01 RX ADMIN — Medication 1 TABLET(S): at 17:19

## 2025-01-01 RX ADMIN — Medication 400 MILLIGRAM(S): at 06:28

## 2025-01-01 RX ADMIN — Medication 1 TABLET(S): at 12:15

## 2025-01-01 RX ADMIN — Medication 1 APPLICATION(S): at 05:48

## 2025-01-01 RX ADMIN — OXYCODONE HYDROCHLORIDE 30 MILLIGRAM(S): 30 TABLET ORAL at 05:09

## 2025-01-01 RX ADMIN — CLOPIDOGREL BISULFATE 75 MILLIGRAM(S): 75 TABLET, FILM COATED ORAL at 11:54

## 2025-01-01 RX ADMIN — HYDROXYZINE HYDROCHLORIDE 25 MILLIGRAM(S): 25 TABLET, FILM COATED ORAL at 06:24

## 2025-01-01 RX ADMIN — EPOETIN ALFA 10000 UNIT(S): 10000 SOLUTION INTRAVENOUS; SUBCUTANEOUS at 11:43

## 2025-01-01 RX ADMIN — MEROPENEM 100 MILLIGRAM(S): 1 INJECTION INTRAVENOUS at 05:34

## 2025-01-01 RX ADMIN — Medication 1 TABLET(S): at 12:26

## 2025-01-01 RX ADMIN — IPRATROPIUM BROMIDE AND ALBUTEROL SULFATE 3 MILLILITER(S): .5; 2.5 SOLUTION RESPIRATORY (INHALATION) at 00:46

## 2025-01-01 RX ADMIN — MUPIROCIN CALCIUM 1 APPLICATION(S): 20 CREAM TOPICAL at 11:13

## 2025-01-01 RX ADMIN — Medication 1 MILLIGRAM(S): at 04:08

## 2025-01-01 RX ADMIN — HYDROXYZINE HYDROCHLORIDE 25 MILLIGRAM(S): 25 TABLET, FILM COATED ORAL at 13:02

## 2025-01-01 RX ADMIN — HYDROXYZINE HYDROCHLORIDE 25 MILLIGRAM(S): 25 TABLET, FILM COATED ORAL at 13:01

## 2025-01-01 RX ADMIN — Medication 2 MILLIGRAM(S): at 11:07

## 2025-01-01 RX ADMIN — CLOPIDOGREL BISULFATE 75 MILLIGRAM(S): 75 TABLET, FILM COATED ORAL at 11:48

## 2025-01-01 RX ADMIN — HEPARIN SODIUM 5000 UNIT(S): 1000 INJECTION INTRAVENOUS; SUBCUTANEOUS at 14:51

## 2025-01-01 RX ADMIN — Medication 2 MILLIGRAM(S): at 15:46

## 2025-01-01 RX ADMIN — Medication 1 TABLET(S): at 18:51

## 2025-01-01 RX ADMIN — HEPARIN SODIUM 5000 UNIT(S): 1000 INJECTION INTRAVENOUS; SUBCUTANEOUS at 14:26

## 2025-01-01 RX ADMIN — Medication 1 APPLICATION(S): at 12:28

## 2025-01-01 RX ADMIN — HEPARIN SODIUM 5000 UNIT(S): 1000 INJECTION INTRAVENOUS; SUBCUTANEOUS at 14:35

## 2025-01-01 RX ADMIN — SODIUM HYPOCHLORITE 1 APPLICATION(S): 0.12 SOLUTION TOPICAL at 14:39

## 2025-01-01 RX ADMIN — Medication 1 MILLIGRAM(S): at 14:03

## 2025-01-01 RX ADMIN — MIDODRINE HYDROCHLORIDE 10 MILLIGRAM(S): 5 TABLET ORAL at 06:43

## 2025-01-01 RX ADMIN — METHOCARBAMOL 750 MILLIGRAM(S): 500 TABLET, FILM COATED ORAL at 06:25

## 2025-01-01 RX ADMIN — MUPIROCIN CALCIUM 1 APPLICATION(S): 20 CREAM TOPICAL at 10:11

## 2025-01-01 RX ADMIN — OXYCODONE HYDROCHLORIDE 10 MILLIGRAM(S): 30 TABLET ORAL at 09:38

## 2025-01-01 RX ADMIN — OXYCODONE HYDROCHLORIDE 30 MILLIGRAM(S): 30 TABLET ORAL at 06:19

## 2025-01-01 RX ADMIN — HYDROXYZINE HYDROCHLORIDE 25 MILLIGRAM(S): 25 TABLET, FILM COATED ORAL at 21:03

## 2025-01-01 RX ADMIN — HEPARIN SODIUM 5000 UNIT(S): 1000 INJECTION INTRAVENOUS; SUBCUTANEOUS at 21:49

## 2025-01-01 RX ADMIN — ATORVASTATIN CALCIUM 40 MILLIGRAM(S): 80 TABLET, FILM COATED ORAL at 21:28

## 2025-01-01 RX ADMIN — ATORVASTATIN CALCIUM 40 MILLIGRAM(S): 80 TABLET, FILM COATED ORAL at 22:18

## 2025-01-01 RX ADMIN — Medication 400 MILLIGRAM(S): at 18:14

## 2025-01-01 RX ADMIN — Medication 2 MILLIGRAM(S): at 14:00

## 2025-01-01 RX ADMIN — DEXTROMETHORPHAN HBR, GUAIFENESIN 1200 MILLIGRAM(S): 200 LIQUID ORAL at 18:41

## 2025-01-01 RX ADMIN — Medication 1 APPLICATION(S): at 06:21

## 2025-01-01 RX ADMIN — OXYCODONE HYDROCHLORIDE 10 MILLIGRAM(S): 30 TABLET ORAL at 15:11

## 2025-01-01 RX ADMIN — CEFEPIME 100 MILLIGRAM(S): 2 INJECTION, POWDER, FOR SOLUTION INTRAVENOUS at 22:45

## 2025-01-01 RX ADMIN — Medication 2001 MILLIGRAM(S): at 08:01

## 2025-01-01 RX ADMIN — HYDROXYZINE HYDROCHLORIDE 25 MILLIGRAM(S): 25 TABLET, FILM COATED ORAL at 14:25

## 2025-01-01 RX ADMIN — EPOETIN ALFA 10000 UNIT(S): 10000 SOLUTION INTRAVENOUS; SUBCUTANEOUS at 08:23

## 2025-01-01 RX ADMIN — Medication 1 APPLICATION(S): at 13:04

## 2025-01-01 RX ADMIN — Medication 2 MILLIGRAM(S): at 11:27

## 2025-01-01 RX ADMIN — OXYCODONE HYDROCHLORIDE 30 MILLIGRAM(S): 30 TABLET ORAL at 17:23

## 2025-01-01 RX ADMIN — Medication 400 MILLIGRAM(S): at 05:07

## 2025-01-01 RX ADMIN — ACETYLCYSTEINE 4 MILLILITER(S): 200 INHALANT RESPIRATORY (INHALATION) at 12:33

## 2025-01-01 RX ADMIN — Medication 2 TABLET(S): at 21:11

## 2025-01-01 RX ADMIN — Medication 400 MILLIGRAM(S): at 18:26

## 2025-01-01 RX ADMIN — Medication 200 GRAM(S): at 02:34

## 2025-01-01 RX ADMIN — Medication 2 TABLET(S): at 21:05

## 2025-01-01 RX ADMIN — Medication 2 MILLIGRAM(S): at 16:26

## 2025-01-01 RX ADMIN — Medication 80 MILLIGRAM(S): at 23:36

## 2025-01-01 RX ADMIN — Medication 0.5 MILLIGRAM(S): at 18:38

## 2025-01-01 RX ADMIN — HEPARIN SODIUM 1700 UNIT(S)/HR: 1000 INJECTION INTRAVENOUS; SUBCUTANEOUS at 19:12

## 2025-01-01 RX ADMIN — OXYCODONE HYDROCHLORIDE 30 MILLIGRAM(S): 30 TABLET ORAL at 06:01

## 2025-01-01 RX ADMIN — Medication 1 TABLET(S): at 08:55

## 2025-01-01 RX ADMIN — POLYETHYLENE GLYCOL 3350 17 GRAM(S): 17 POWDER, FOR SOLUTION ORAL at 11:30

## 2025-01-01 RX ADMIN — Medication 2 MILLIGRAM(S): at 08:45

## 2025-01-01 RX ADMIN — OXYCODONE HYDROCHLORIDE 30 MILLIGRAM(S): 30 TABLET ORAL at 05:54

## 2025-01-01 RX ADMIN — Medication 81 MILLIGRAM(S): at 13:00

## 2025-01-01 RX ADMIN — METHOCARBAMOL 750 MILLIGRAM(S): 500 TABLET, FILM COATED ORAL at 17:01

## 2025-01-01 RX ADMIN — ATORVASTATIN CALCIUM 40 MILLIGRAM(S): 80 TABLET, FILM COATED ORAL at 21:25

## 2025-01-01 RX ADMIN — BACITRACIN ZINC AND POLYMYXIN B SULFATE 1 APPLICATION(S): 500; 10000 OINTMENT TOPICAL at 05:55

## 2025-01-01 RX ADMIN — Medication 1 LOZENGE: at 06:22

## 2025-01-01 RX ADMIN — POLYETHYLENE GLYCOL 3350 17 GRAM(S): 17 POWDER, FOR SOLUTION ORAL at 13:13

## 2025-01-01 RX ADMIN — Medication 4 MILLIGRAM(S): at 13:45

## 2025-01-01 RX ADMIN — HYDROXYZINE HYDROCHLORIDE 25 MILLIGRAM(S): 25 TABLET, FILM COATED ORAL at 18:04

## 2025-01-01 RX ADMIN — Medication 40 MILLIGRAM(S): at 08:13

## 2025-01-01 RX ADMIN — BACITRACIN ZINC AND POLYMYXIN B SULFATE 1 APPLICATION(S): 500; 10000 OINTMENT TOPICAL at 17:58

## 2025-01-01 RX ADMIN — Medication 40 MILLIGRAM(S): at 05:52

## 2025-01-01 RX ADMIN — METHOCARBAMOL 750 MILLIGRAM(S): 500 TABLET, FILM COATED ORAL at 08:06

## 2025-01-01 RX ADMIN — CEFEPIME 100 MILLIGRAM(S): 2 INJECTION, POWDER, FOR SOLUTION INTRAVENOUS at 23:11

## 2025-01-01 RX ADMIN — OXYCODONE HYDROCHLORIDE 10 MILLIGRAM(S): 30 TABLET ORAL at 21:41

## 2025-01-01 RX ADMIN — Medication 400 MILLIGRAM(S): at 09:20

## 2025-01-01 RX ADMIN — OXYCODONE HYDROCHLORIDE 10 MILLIGRAM(S): 30 TABLET ORAL at 12:52

## 2025-01-01 RX ADMIN — POLYETHYLENE GLYCOL 3350 17 GRAM(S): 17 POWDER, FOR SOLUTION ORAL at 12:00

## 2025-01-01 RX ADMIN — Medication 1 MILLIGRAM(S): at 17:56

## 2025-01-01 RX ADMIN — IPRATROPIUM BROMIDE AND ALBUTEROL SULFATE 3 MILLILITER(S): .5; 2.5 SOLUTION RESPIRATORY (INHALATION) at 12:48

## 2025-01-01 RX ADMIN — OXYCODONE HYDROCHLORIDE 10 MILLIGRAM(S): 30 TABLET ORAL at 17:48

## 2025-01-01 RX ADMIN — EPOETIN ALFA 10000 UNIT(S): 10000 SOLUTION INTRAVENOUS; SUBCUTANEOUS at 09:11

## 2025-01-01 RX ADMIN — Medication 1 LOZENGE: at 10:39

## 2025-01-01 RX ADMIN — Medication 2 MILLIGRAM(S): at 16:41

## 2025-01-01 RX ADMIN — OXYCODONE HYDROCHLORIDE 30 MILLIGRAM(S): 30 TABLET ORAL at 17:41

## 2025-01-01 RX ADMIN — Medication 1000 MILLIGRAM(S): at 12:35

## 2025-01-01 RX ADMIN — OXYCODONE HYDROCHLORIDE 10 MILLIGRAM(S): 30 TABLET ORAL at 12:21

## 2025-01-01 RX ADMIN — POLYETHYLENE GLYCOL 3350 17 GRAM(S): 17 POWDER, FOR SOLUTION ORAL at 13:03

## 2025-01-01 RX ADMIN — OXYCODONE HYDROCHLORIDE 30 MILLIGRAM(S): 30 TABLET ORAL at 16:51

## 2025-01-01 RX ADMIN — OXYCODONE HYDROCHLORIDE 30 MILLIGRAM(S): 30 TABLET ORAL at 17:38

## 2025-01-01 RX ADMIN — Medication 1 MILLIGRAM(S): at 15:00

## 2025-01-01 RX ADMIN — POLYETHYLENE GLYCOL 3350 17 GRAM(S): 17 POWDER, FOR SOLUTION ORAL at 11:05

## 2025-01-01 RX ADMIN — HEPARIN SODIUM 5000 UNIT(S): 1000 INJECTION INTRAVENOUS; SUBCUTANEOUS at 13:19

## 2025-01-01 RX ADMIN — Medication 63.75 MILLIMOLE(S): at 09:28

## 2025-01-01 RX ADMIN — POLYETHYLENE GLYCOL 3350 17 GRAM(S): 17 POWDER, FOR SOLUTION ORAL at 14:36

## 2025-01-01 RX ADMIN — PREGABALIN 25 MILLIGRAM(S): 75 CAPSULE ORAL at 17:13

## 2025-01-01 RX ADMIN — IPRATROPIUM BROMIDE AND ALBUTEROL SULFATE 3 MILLILITER(S): .5; 2.5 SOLUTION RESPIRATORY (INHALATION) at 23:38

## 2025-01-01 RX ADMIN — HYDROXYZINE HYDROCHLORIDE 25 MILLIGRAM(S): 25 TABLET, FILM COATED ORAL at 13:41

## 2025-01-01 RX ADMIN — IPRATROPIUM BROMIDE AND ALBUTEROL SULFATE 3 MILLILITER(S): .5; 2.5 SOLUTION RESPIRATORY (INHALATION) at 12:30

## 2025-01-01 RX ADMIN — Medication 1 APPLICATION(S): at 06:34

## 2025-01-01 RX ADMIN — Medication 1334 MILLIGRAM(S): at 12:29

## 2025-01-01 RX ADMIN — IPRATROPIUM BROMIDE AND ALBUTEROL SULFATE 3 MILLILITER(S): .5; 2.5 SOLUTION RESPIRATORY (INHALATION) at 11:49

## 2025-01-01 RX ADMIN — BACITRACIN ZINC AND POLYMYXIN B SULFATE 1 APPLICATION(S): 500; 10000 OINTMENT TOPICAL at 18:38

## 2025-01-01 RX ADMIN — HEPARIN SODIUM 5000 UNIT(S): 1000 INJECTION INTRAVENOUS; SUBCUTANEOUS at 04:58

## 2025-01-01 RX ADMIN — Medication 1 APPLICATION(S): at 18:20

## 2025-01-01 RX ADMIN — DEXTROMETHORPHAN HBR, GUAIFENESIN 1200 MILLIGRAM(S): 200 LIQUID ORAL at 05:19

## 2025-01-01 RX ADMIN — OXYCODONE HYDROCHLORIDE 20 MILLIGRAM(S): 30 TABLET ORAL at 05:55

## 2025-01-01 RX ADMIN — Medication 1334 MILLIGRAM(S): at 17:06

## 2025-01-01 RX ADMIN — OXYCODONE HYDROCHLORIDE 10 MILLIGRAM(S): 30 TABLET ORAL at 21:25

## 2025-01-01 RX ADMIN — Medication 2 TABLET(S): at 21:13

## 2025-01-01 RX ADMIN — Medication 400 MILLIGRAM(S): at 05:10

## 2025-01-01 RX ADMIN — HEPARIN SODIUM 5000 UNIT(S): 1000 INJECTION INTRAVENOUS; SUBCUTANEOUS at 06:00

## 2025-01-01 RX ADMIN — OXYCODONE HYDROCHLORIDE 30 MILLIGRAM(S): 30 TABLET ORAL at 19:20

## 2025-01-01 RX ADMIN — OXYCODONE HYDROCHLORIDE 30 MILLIGRAM(S): 30 TABLET ORAL at 06:05

## 2025-01-01 RX ADMIN — Medication 80 MILLIGRAM(S): at 05:01

## 2025-01-01 RX ADMIN — ATORVASTATIN CALCIUM 40 MILLIGRAM(S): 80 TABLET, FILM COATED ORAL at 23:26

## 2025-01-01 RX ADMIN — Medication 400 MILLIGRAM(S): at 17:13

## 2025-01-01 RX ADMIN — OXYCODONE HYDROCHLORIDE 10 MILLIGRAM(S): 30 TABLET ORAL at 19:39

## 2025-01-01 RX ADMIN — Medication 2 TABLET(S): at 21:21

## 2025-01-01 RX ADMIN — Medication 400 MILLIGRAM(S): at 02:03

## 2025-01-01 RX ADMIN — OXYCODONE HYDROCHLORIDE 20 MILLIGRAM(S): 30 TABLET ORAL at 05:45

## 2025-01-01 RX ADMIN — Medication 400 MILLIGRAM(S): at 18:04

## 2025-01-01 RX ADMIN — HYDROXYZINE HYDROCHLORIDE 25 MILLIGRAM(S): 25 TABLET, FILM COATED ORAL at 22:03

## 2025-01-01 RX ADMIN — OXYCODONE HYDROCHLORIDE 10 MILLIGRAM(S): 30 TABLET ORAL at 19:35

## 2025-01-01 RX ADMIN — Medication 400 MILLIGRAM(S): at 19:08

## 2025-01-01 RX ADMIN — IPRATROPIUM BROMIDE AND ALBUTEROL SULFATE 3 MILLILITER(S): .5; 2.5 SOLUTION RESPIRATORY (INHALATION) at 23:25

## 2025-01-01 RX ADMIN — Medication 81 MILLIGRAM(S): at 12:02

## 2025-01-01 RX ADMIN — OXYCODONE HYDROCHLORIDE 10 MILLIGRAM(S): 30 TABLET ORAL at 14:21

## 2025-01-01 RX ADMIN — OXYCODONE HYDROCHLORIDE 10 MILLIGRAM(S): 30 TABLET ORAL at 17:01

## 2025-01-01 RX ADMIN — SODIUM HYPOCHLORITE 1 APPLICATION(S): 0.12 SOLUTION TOPICAL at 13:23

## 2025-01-01 RX ADMIN — HEPARIN SODIUM 1300 UNIT(S)/HR: 1000 INJECTION INTRAVENOUS; SUBCUTANEOUS at 07:11

## 2025-01-01 RX ADMIN — LACTULOSE 10 GRAM(S): 10 SOLUTION ORAL at 13:18

## 2025-01-01 RX ADMIN — Medication 1 APPLICATION(S): at 13:23

## 2025-01-01 RX ADMIN — Medication 400 MILLIGRAM(S): at 17:21

## 2025-01-01 RX ADMIN — METHOCARBAMOL 750 MILLIGRAM(S): 500 TABLET, FILM COATED ORAL at 22:45

## 2025-01-01 RX ADMIN — OXYCODONE HYDROCHLORIDE 20 MILLIGRAM(S): 30 TABLET ORAL at 22:45

## 2025-01-01 RX ADMIN — Medication 1 MILLIGRAM(S): at 18:03

## 2025-01-01 RX ADMIN — HYDROXYZINE HYDROCHLORIDE 25 MILLIGRAM(S): 25 TABLET, FILM COATED ORAL at 06:00

## 2025-01-01 RX ADMIN — HYDROXYZINE HYDROCHLORIDE 25 MILLIGRAM(S): 25 TABLET, FILM COATED ORAL at 18:14

## 2025-01-01 RX ADMIN — HEPARIN SODIUM 5000 UNIT(S): 1000 INJECTION INTRAVENOUS; SUBCUTANEOUS at 21:52

## 2025-01-01 RX ADMIN — SODIUM HYPOCHLORITE 1 APPLICATION(S): 0.12 SOLUTION TOPICAL at 06:25

## 2025-01-01 RX ADMIN — Medication 2 MILLIGRAM(S): at 16:38

## 2025-01-01 RX ADMIN — IPRATROPIUM BROMIDE AND ALBUTEROL SULFATE 3 MILLILITER(S): .5; 2.5 SOLUTION RESPIRATORY (INHALATION) at 06:31

## 2025-01-01 RX ADMIN — HEPARIN SODIUM 5000 UNIT(S): 1000 INJECTION INTRAVENOUS; SUBCUTANEOUS at 06:31

## 2025-01-01 RX ADMIN — Medication 2 MILLIGRAM(S): at 13:43

## 2025-01-01 RX ADMIN — Medication 2 TABLET(S): at 21:28

## 2025-01-01 RX ADMIN — DEXTROMETHORPHAN HBR, GUAIFENESIN 1200 MILLIGRAM(S): 200 LIQUID ORAL at 18:24

## 2025-01-01 RX ADMIN — DEXTROMETHORPHAN HBR, GUAIFENESIN 1200 MILLIGRAM(S): 200 LIQUID ORAL at 05:37

## 2025-01-01 RX ADMIN — HYDROXYZINE HYDROCHLORIDE 25 MILLIGRAM(S): 25 TABLET, FILM COATED ORAL at 06:04

## 2025-01-01 RX ADMIN — OXYCODONE HYDROCHLORIDE 20 MILLIGRAM(S): 30 TABLET ORAL at 06:00

## 2025-01-01 RX ADMIN — Medication 81 MILLIGRAM(S): at 12:47

## 2025-01-01 RX ADMIN — Medication 2 MILLIGRAM(S): at 02:51

## 2025-01-01 RX ADMIN — DEXTROMETHORPHAN HBR, GUAIFENESIN 1200 MILLIGRAM(S): 200 LIQUID ORAL at 08:58

## 2025-01-01 RX ADMIN — Medication 400 MILLIGRAM(S): at 18:36

## 2025-01-01 RX ADMIN — POLYETHYLENE GLYCOL 3350 17 GRAM(S): 17 POWDER, FOR SOLUTION ORAL at 12:02

## 2025-01-01 RX ADMIN — HYDROXYZINE HYDROCHLORIDE 25 MILLIGRAM(S): 25 TABLET, FILM COATED ORAL at 14:34

## 2025-01-01 RX ADMIN — Medication 1 APPLICATION(S): at 11:36

## 2025-01-01 RX ADMIN — HYDROXYZINE HYDROCHLORIDE 25 MILLIGRAM(S): 25 TABLET, FILM COATED ORAL at 17:13

## 2025-01-01 RX ADMIN — Medication 1 APPLICATION(S): at 13:39

## 2025-01-01 RX ADMIN — POLYETHYLENE GLYCOL 3350 17 GRAM(S): 17 POWDER, FOR SOLUTION ORAL at 11:18

## 2025-01-01 RX ADMIN — Medication 1 TABLET(S): at 13:20

## 2025-01-01 RX ADMIN — CLOPIDOGREL BISULFATE 75 MILLIGRAM(S): 75 TABLET, FILM COATED ORAL at 11:11

## 2025-01-01 RX ADMIN — Medication 400 MILLIGRAM(S): at 05:51

## 2025-01-01 RX ADMIN — MEROPENEM 100 MILLIGRAM(S): 1 INJECTION INTRAVENOUS at 05:00

## 2025-01-01 RX ADMIN — IPRATROPIUM BROMIDE AND ALBUTEROL SULFATE 3 MILLILITER(S): .5; 2.5 SOLUTION RESPIRATORY (INHALATION) at 12:13

## 2025-01-01 RX ADMIN — Medication 1334 MILLIGRAM(S): at 12:57

## 2025-01-01 RX ADMIN — Medication 40 MILLIGRAM(S): at 05:02

## 2025-01-01 RX ADMIN — Medication 1 TABLET(S): at 07:44

## 2025-01-01 RX ADMIN — Medication 400 MILLIGRAM(S): at 22:04

## 2025-01-01 RX ADMIN — CLOPIDOGREL BISULFATE 75 MILLIGRAM(S): 75 TABLET, FILM COATED ORAL at 11:30

## 2025-01-01 RX ADMIN — OXYCODONE HYDROCHLORIDE 20 MILLIGRAM(S): 30 TABLET ORAL at 13:13

## 2025-01-01 RX ADMIN — HEPARIN SODIUM 5000 UNIT(S): 1000 INJECTION INTRAVENOUS; SUBCUTANEOUS at 22:19

## 2025-01-01 RX ADMIN — HYDROXYZINE HYDROCHLORIDE 25 MILLIGRAM(S): 25 TABLET, FILM COATED ORAL at 05:09

## 2025-01-01 RX ADMIN — Medication 400 MILLIGRAM(S): at 06:19

## 2025-01-01 RX ADMIN — METHOCARBAMOL 750 MILLIGRAM(S): 500 TABLET, FILM COATED ORAL at 04:59

## 2025-01-01 RX ADMIN — ACETYLCYSTEINE 4 MILLILITER(S): 200 INHALANT RESPIRATORY (INHALATION) at 05:40

## 2025-01-01 RX ADMIN — MUPIROCIN CALCIUM 1 APPLICATION(S): 20 CREAM TOPICAL at 12:42

## 2025-01-01 RX ADMIN — SODIUM HYPOCHLORITE 1 APPLICATION(S): 0.12 SOLUTION TOPICAL at 13:13

## 2025-01-01 RX ADMIN — OXYCODONE HYDROCHLORIDE 10 MILLIGRAM(S): 30 TABLET ORAL at 15:53

## 2025-01-01 RX ADMIN — Medication 2 MILLIGRAM(S): at 04:52

## 2025-01-01 RX ADMIN — OXYCODONE HYDROCHLORIDE 10 MILLIGRAM(S): 30 TABLET ORAL at 20:51

## 2025-01-01 RX ADMIN — Medication 1 APPLICATION(S): at 13:31

## 2025-01-01 RX ADMIN — OXYCODONE HYDROCHLORIDE 30 MILLIGRAM(S): 30 TABLET ORAL at 09:44

## 2025-01-01 RX ADMIN — ATORVASTATIN CALCIUM 40 MILLIGRAM(S): 80 TABLET, FILM COATED ORAL at 23:11

## 2025-01-01 RX ADMIN — Medication 40 MILLIGRAM(S): at 06:02

## 2025-01-01 RX ADMIN — Medication 1 TABLET(S): at 18:20

## 2025-01-01 RX ADMIN — MUPIROCIN CALCIUM 1 APPLICATION(S): 20 CREAM TOPICAL at 11:30

## 2025-01-01 RX ADMIN — Medication 1 TABLET(S): at 17:44

## 2025-01-01 RX ADMIN — METHOCARBAMOL 750 MILLIGRAM(S): 500 TABLET, FILM COATED ORAL at 14:26

## 2025-01-01 RX ADMIN — HEPARIN SODIUM 1300 UNIT(S)/HR: 1000 INJECTION INTRAVENOUS; SUBCUTANEOUS at 11:38

## 2025-01-01 RX ADMIN — Medication 400 MILLIGRAM(S): at 18:56

## 2025-01-01 RX ADMIN — OXYCODONE HYDROCHLORIDE 30 MILLIGRAM(S): 30 TABLET ORAL at 05:29

## 2025-01-01 RX ADMIN — ATORVASTATIN CALCIUM 40 MILLIGRAM(S): 80 TABLET, FILM COATED ORAL at 21:13

## 2025-01-01 RX ADMIN — MIDODRINE HYDROCHLORIDE 10 MILLIGRAM(S): 5 TABLET ORAL at 11:50

## 2025-01-01 RX ADMIN — Medication 40 MILLIGRAM(S): at 08:06

## 2025-01-01 RX ADMIN — OXYCODONE HYDROCHLORIDE 5 MILLIGRAM(S): 30 TABLET ORAL at 07:59

## 2025-01-01 RX ADMIN — Medication 2 TABLET(S): at 22:40

## 2025-01-01 RX ADMIN — Medication 40 MILLIGRAM(S): at 06:43

## 2025-01-01 RX ADMIN — HEPARIN SODIUM 5000 UNIT(S): 1000 INJECTION INTRAVENOUS; SUBCUTANEOUS at 21:32

## 2025-01-01 RX ADMIN — MUPIROCIN CALCIUM 1 APPLICATION(S): 20 CREAM TOPICAL at 09:45

## 2025-01-01 RX ADMIN — Medication 1 TABLET(S): at 11:31

## 2025-01-01 RX ADMIN — Medication 1 MILLIGRAM(S): at 05:23

## 2025-01-01 RX ADMIN — ATORVASTATIN CALCIUM 40 MILLIGRAM(S): 80 TABLET, FILM COATED ORAL at 22:27

## 2025-01-01 RX ADMIN — OXYCODONE HYDROCHLORIDE 30 MILLIGRAM(S): 30 TABLET ORAL at 18:44

## 2025-01-01 RX ADMIN — IPRATROPIUM BROMIDE AND ALBUTEROL SULFATE 3 MILLILITER(S): .5; 2.5 SOLUTION RESPIRATORY (INHALATION) at 17:05

## 2025-01-01 RX ADMIN — Medication 2 MILLIGRAM(S): at 08:12

## 2025-01-01 RX ADMIN — Medication 250 MILLIGRAM(S): at 21:12

## 2025-01-01 RX ADMIN — HYDROXYZINE HYDROCHLORIDE 25 MILLIGRAM(S): 25 TABLET, FILM COATED ORAL at 19:31

## 2025-01-01 RX ADMIN — EPOETIN ALFA 10000 UNIT(S): 10000 SOLUTION INTRAVENOUS; SUBCUTANEOUS at 08:20

## 2025-01-01 RX ADMIN — SODIUM HYPOCHLORITE 1 APPLICATION(S): 0.12 SOLUTION TOPICAL at 22:57

## 2025-01-01 RX ADMIN — Medication 2.5 MILLIGRAM(S): at 22:02

## 2025-01-01 RX ADMIN — Medication 1 APPLICATION(S): at 12:14

## 2025-01-01 RX ADMIN — HYDROXYZINE HYDROCHLORIDE 25 MILLIGRAM(S): 25 TABLET, FILM COATED ORAL at 21:43

## 2025-01-01 RX ADMIN — ATORVASTATIN CALCIUM 40 MILLIGRAM(S): 80 TABLET, FILM COATED ORAL at 21:47

## 2025-01-01 RX ADMIN — Medication 400 MILLIGRAM(S): at 18:06

## 2025-01-01 RX ADMIN — Medication 2 MILLIGRAM(S): at 09:00

## 2025-01-01 RX ADMIN — Medication 2 MILLIGRAM(S): at 07:41

## 2025-01-01 RX ADMIN — Medication 1 APPLICATION(S): at 05:59

## 2025-01-01 RX ADMIN — Medication 40 MILLIGRAM(S): at 06:06

## 2025-01-01 RX ADMIN — IPRATROPIUM BROMIDE AND ALBUTEROL SULFATE 3 MILLILITER(S): .5; 2.5 SOLUTION RESPIRATORY (INHALATION) at 13:14

## 2025-01-01 RX ADMIN — METHOCARBAMOL 750 MILLIGRAM(S): 500 TABLET, FILM COATED ORAL at 23:26

## 2025-01-01 RX ADMIN — ATORVASTATIN CALCIUM 40 MILLIGRAM(S): 80 TABLET, FILM COATED ORAL at 21:46

## 2025-01-01 RX ADMIN — Medication 2 MILLIGRAM(S): at 20:31

## 2025-01-01 RX ADMIN — Medication 81 MILLIGRAM(S): at 12:35

## 2025-01-01 RX ADMIN — Medication 1 TABLET(S): at 13:11

## 2025-01-01 RX ADMIN — Medication 2 TABLET(S): at 21:40

## 2025-01-01 RX ADMIN — DEXTROMETHORPHAN HBR, GUAIFENESIN 1200 MILLIGRAM(S): 200 LIQUID ORAL at 17:58

## 2025-01-01 RX ADMIN — ATORVASTATIN CALCIUM 40 MILLIGRAM(S): 80 TABLET, FILM COATED ORAL at 21:18

## 2025-01-01 RX ADMIN — Medication 2 MILLIGRAM(S): at 12:16

## 2025-01-01 RX ADMIN — OXYCODONE HYDROCHLORIDE 30 MILLIGRAM(S): 30 TABLET ORAL at 05:00

## 2025-01-01 RX ADMIN — Medication 1 APPLICATION(S): at 10:56

## 2025-01-01 RX ADMIN — Medication 81 MILLIGRAM(S): at 11:29

## 2025-01-01 RX ADMIN — KETOROLAC TROMETHAMINE 15 MILLIGRAM(S): 30 INJECTION, SOLUTION INTRAMUSCULAR; INTRAVENOUS at 09:41

## 2025-01-01 RX ADMIN — OXYCODONE HYDROCHLORIDE 20 MILLIGRAM(S): 30 TABLET ORAL at 21:40

## 2025-01-01 RX ADMIN — Medication 2 MILLIGRAM(S): at 03:33

## 2025-01-01 RX ADMIN — Medication 400 MILLIGRAM(S): at 05:39

## 2025-01-01 RX ADMIN — Medication 2 MILLIGRAM(S): at 13:30

## 2025-01-01 RX ADMIN — HEPARIN SODIUM 3500 UNIT(S): 1000 INJECTION INTRAVENOUS; SUBCUTANEOUS at 14:24

## 2025-01-01 RX ADMIN — HEPARIN SODIUM 5000 UNIT(S): 1000 INJECTION INTRAVENOUS; SUBCUTANEOUS at 06:03

## 2025-01-01 RX ADMIN — Medication 1 TABLET(S): at 05:04

## 2025-01-01 RX ADMIN — DEXTROMETHORPHAN HBR, GUAIFENESIN 1200 MILLIGRAM(S): 200 LIQUID ORAL at 05:31

## 2025-01-03 ENCOUNTER — APPOINTMENT (OUTPATIENT)
Dept: COLORECTAL SURGERY | Facility: CLINIC | Age: 75
End: 2025-01-03
Payer: MEDICARE

## 2025-01-03 DIAGNOSIS — Z78.9 OTHER SPECIFIED HEALTH STATUS: ICD-10-CM

## 2025-01-03 DIAGNOSIS — Z93.3 COLOSTOMY STATUS: ICD-10-CM

## 2025-01-03 DIAGNOSIS — Z98.890 OTHER SPECIFIED POSTPROCEDURAL STATES: ICD-10-CM

## 2025-01-03 PROCEDURE — 99024 POSTOP FOLLOW-UP VISIT: CPT

## 2025-01-07 VITALS — TEMPERATURE: 96.7 F | HEIGHT: 68 IN | WEIGHT: 200 LBS | BODY MASS INDEX: 30.31 KG/M2 | RESPIRATION RATE: 16 BRPM

## 2025-01-08 NOTE — PROGRESS NOTE ADULT - PROVIDER SPECIALTY LIST ADULT
Hospitalist
Nephrology
Orthopedics
Anticoag Management
Anticoag Management
Orthopedics
Anticoag Management
Hospitalist
Orthopedics
Orthopedics
Hospitalist
Nephrology
none

## 2025-01-09 NOTE — ASU PREOP CHECKLIST - AS BP NONINV METHOD
Mr David Welch is a 40 year old male with a PMH of ESRD on HD T/R/Sat, HTN, anemia, CVA, b/l hip fracture now wheelchair dependent presenting w/ weakness, found to have sepsis in setting of influenza A. Concern for SOLITRAIO so placed on BIPAP. 1/5 had increased pain, warmth, swelling near the left AV fistula found to have superficial thrombophlebitis near the left cephalic vein.  electronic

## 2025-01-10 PROBLEM — Z78.9 NON-SMOKER: Status: ACTIVE | Noted: 2025-01-10

## 2025-01-15 LAB
CULTURE RESULTS: ABNORMAL
SPECIMEN SOURCE: SIGNIFICANT CHANGE UP

## 2025-01-17 NOTE — ASU PATIENT PROFILE, ADULT - TEACHING/LEARNING FACTORS IMPACT ABILITY TO LEARN
8 YR Well SCHEDULED 1/22 11AM  
Left med check appointment without scheduling any follow ups.  Patient overdue for a well visit and needs a 3 month med check follow up.  Recall was placed for med check but please call to schedule well visit per Denisse.   
none

## 2025-01-24 ENCOUNTER — APPOINTMENT (OUTPATIENT)
Dept: UROLOGY | Facility: CLINIC | Age: 75
End: 2025-01-24
Payer: MEDICARE

## 2025-01-24 VITALS
HEART RATE: 92 BPM | OXYGEN SATURATION: 100 % | BODY MASS INDEX: 30.31 KG/M2 | WEIGHT: 200 LBS | SYSTOLIC BLOOD PRESSURE: 144 MMHG | DIASTOLIC BLOOD PRESSURE: 81 MMHG | HEIGHT: 68 IN

## 2025-01-24 DIAGNOSIS — N13.8 BENIGN PROSTATIC HYPERPLASIA WITH LOWER URINARY TRACT SYMPMS: ICD-10-CM

## 2025-01-24 DIAGNOSIS — N40.1 BENIGN PROSTATIC HYPERPLASIA WITH LOWER URINARY TRACT SYMPMS: ICD-10-CM

## 2025-01-24 DIAGNOSIS — N31.9 NEUROMUSCULAR DYSFUNCTION OF BLADDER, UNSPECIFIED: ICD-10-CM

## 2025-01-24 PROCEDURE — 99214 OFFICE O/P EST MOD 30 MIN: CPT

## 2025-01-26 PROBLEM — N40.1 BENIGN LOCALIZED HYPERPLASIA OF PROSTATE WITH URINARY OBSTRUCTION: Status: ACTIVE | Noted: 2025-01-26

## 2025-01-26 PROBLEM — N31.9 NEUROGENIC BLADDER: Status: ACTIVE | Noted: 2025-01-26

## 2025-01-27 RX ORDER — CIPROFLOXACIN HYDROCHLORIDE 500 MG/1
500 TABLET, FILM COATED ORAL
Qty: 28 | Refills: 0 | Status: COMPLETED | COMMUNITY
Start: 2025-01-24 | End: 2025-01-27

## 2025-01-29 LAB
APPEARANCE: ABNORMAL
BACTERIA UR CULT: NORMAL
BACTERIA: ABNORMAL /HPF
BILIRUBIN URINE: NEGATIVE
BLOOD URINE: ABNORMAL
CAST: 2 /LPF
COLOR: YELLOW
EPITHELIAL CELLS: >36 /HPF
GLUCOSE QUALITATIVE U: NEGATIVE MG/DL
KETONES URINE: NEGATIVE MG/DL
LEUKOCYTE ESTERASE URINE: ABNORMAL
MICROSCOPIC-UA: NORMAL
NITRITE URINE: NEGATIVE
PH URINE: 6
PROTEIN URINE: 300 MG/DL
RED BLOOD CELLS URINE: 108 /HPF
REVIEW: NORMAL
SPECIFIC GRAVITY URINE: 1.01
UROBILINOGEN URINE: 0.2 MG/DL
WHITE BLOOD CELLS URINE: >998 /HPF

## 2025-01-30 ENCOUNTER — APPOINTMENT (OUTPATIENT)
Dept: UROLOGY | Facility: CLINIC | Age: 75
End: 2025-01-30

## 2025-01-31 ENCOUNTER — APPOINTMENT (OUTPATIENT)
Dept: UROLOGY | Facility: CLINIC | Age: 75
End: 2025-01-31
Payer: MEDICARE

## 2025-01-31 VITALS
TEMPERATURE: 97.6 F | HEIGHT: 68 IN | RESPIRATION RATE: 18 BRPM | DIASTOLIC BLOOD PRESSURE: 83 MMHG | BODY MASS INDEX: 30.31 KG/M2 | WEIGHT: 200 LBS | SYSTOLIC BLOOD PRESSURE: 116 MMHG | HEART RATE: 90 BPM | OXYGEN SATURATION: 100 %

## 2025-01-31 PROCEDURE — 51710 CHANGE OF BLADDER TUBE: CPT

## 2025-01-31 RX ORDER — CIPROFLOXACIN HYDROCHLORIDE 500 MG/1
500 TABLET, FILM COATED ORAL
Refills: 0 | Status: COMPLETED | OUTPATIENT
Start: 2025-01-31

## 2025-01-31 RX ORDER — SULFAMETHOXAZOLE AND TRIMETHOPRIM 800; 160 MG/1; MG/1
800-160 TABLET ORAL TWICE DAILY
Qty: 10 | Refills: 0 | Status: ACTIVE | COMMUNITY
Start: 2025-01-31 | End: 1900-01-01

## 2025-01-31 RX ORDER — CIPROFLOXACIN HYDROCHLORIDE 500 MG/1
500 TABLET, FILM COATED ORAL
Qty: 1 | Refills: 0 | Status: ACTIVE | OUTPATIENT
Start: 2025-01-31

## 2025-01-31 RX ADMIN — CIPROFLOXACIN 0 MG: 500 TABLET, FILM COATED ORAL at 00:00

## 2025-02-01 RX ORDER — SULFAMETHOXAZOLE AND TRIMETHOPRIM 400; 80 MG/1; MG/1
1 TABLET ORAL
Refills: 0 | DISCHARGE
Start: 2025-02-01

## 2025-02-03 LAB
APPEARANCE: CLEAR
BACTERIA UR CULT: NORMAL
BACTERIA: NEGATIVE /HPF
BILIRUBIN URINE: NEGATIVE
BLOOD URINE: ABNORMAL
CAST: NORMAL /LPF
COLOR: YELLOW
EPITHELIAL CELLS: 0 /HPF
GLUCOSE QUALITATIVE U: NEGATIVE MG/DL
KETONES URINE: NEGATIVE MG/DL
LEUKOCYTE ESTERASE URINE: ABNORMAL
MICROSCOPIC-UA: NORMAL
NITRITE URINE: NEGATIVE
PH URINE: 6.5
PROTEIN URINE: 100 MG/DL
RED BLOOD CELLS URINE: 1 /HPF
REVIEW: NORMAL
SPECIFIC GRAVITY URINE: <1.005
UROBILINOGEN URINE: 0.2 MG/DL
WHITE BLOOD CELLS URINE: 84 /HPF

## 2025-02-04 ENCOUNTER — INPATIENT (INPATIENT)
Facility: HOSPITAL | Age: 75
LOS: 2 days | Discharge: HOME HEALTH | DRG: 683 | End: 2025-02-07
Attending: INTERNAL MEDICINE | Admitting: INTERNAL MEDICINE
Payer: MEDICARE

## 2025-02-04 VITALS
WEIGHT: 199.96 LBS | RESPIRATION RATE: 18 BRPM | HEART RATE: 97 BPM | OXYGEN SATURATION: 94 % | SYSTOLIC BLOOD PRESSURE: 137 MMHG | TEMPERATURE: 98 F | HEIGHT: 68 IN | DIASTOLIC BLOOD PRESSURE: 57 MMHG

## 2025-02-04 DIAGNOSIS — Z93.3 COLOSTOMY STATUS: Chronic | ICD-10-CM

## 2025-02-04 DIAGNOSIS — Z98.890 OTHER SPECIFIED POSTPROCEDURAL STATES: Chronic | ICD-10-CM

## 2025-02-04 DIAGNOSIS — Z98.49 CATARACT EXTRACTION STATUS, UNSPECIFIED EYE: Chronic | ICD-10-CM

## 2025-02-04 DIAGNOSIS — Z93.59 OTHER CYSTOSTOMY STATUS: Chronic | ICD-10-CM

## 2025-02-04 LAB
GAS PNL BLDV: SIGNIFICANT CHANGE UP
HCT VFR BLD CALC: 31.3 % — LOW (ref 39–50)
HGB BLD-MCNC: 9.8 G/DL — LOW (ref 13–17)
MCHC RBC-ENTMCNC: 27.5 PG — SIGNIFICANT CHANGE UP (ref 27–34)
MCHC RBC-ENTMCNC: 31.3 G/DL — LOW (ref 32–36)
MCV RBC AUTO: 87.7 FL — SIGNIFICANT CHANGE UP (ref 80–100)
PLATELET # BLD AUTO: 284 K/UL — SIGNIFICANT CHANGE UP (ref 150–400)
RBC # BLD: 3.57 M/UL — LOW (ref 4.2–5.8)
RBC # FLD: 13.8 % — SIGNIFICANT CHANGE UP (ref 10.3–14.5)
WBC # BLD: 9.79 K/UL — SIGNIFICANT CHANGE UP (ref 3.8–10.5)
WBC # FLD AUTO: 9.79 K/UL — SIGNIFICANT CHANGE UP (ref 3.8–10.5)

## 2025-02-04 PROCEDURE — 99285 EMERGENCY DEPT VISIT HI MDM: CPT

## 2025-02-04 PROCEDURE — 76770 US EXAM ABDO BACK WALL COMP: CPT | Mod: 26

## 2025-02-04 NOTE — CONSULT NOTE ADULT - SUBJECTIVE AND OBJECTIVE BOX
HPI: Mr. Delgado is a 74 year-old man with history of multiple medical issues including polio with associated neurogenic bladder/suprapubic catheter, iatrogenic rectal rupture requiring colostomy 2/2023, and stage 5 chronic kidney disease with baseline creatinine in the 4s prior to 11/2024. He underwent colostomy reversal 11/2024; he was admitted at [facility] 12/20-12/25/24 with ischemic ATN with associated hyperkalemia and uremia. His creatinine peaked in the 9s. We were able to get by without dialysis during the admission; his creatinine slowly improved from that point forward and was as low as 6.46mg/dL on 1/17/25. I saw him at my office on Friday 1/31/25; at that time he felt as well as he has felt over the last few months. He went for suprapubic catheter exchange later in the day at [facility]. Since the procedure, he has developed worsening generalized weakness, fatigue, loss of appetite, and pruritus. His daughter Norma called me today and informed me of his symptoms; given my concern that he had developed a new bout of SONDRA and was uremic, I advised that he be brought to the ER.        PAST MEDICAL & SURGICAL HISTORY:  Polio - wheelchair-dependent  Neurogenic bladder - suprapubic catheter  Erectile dysfunction  HTN  HLD  Colostomy/reversal  DVT  B/L LE fracture ==>ORIF  Cataract surgery - right  Shoulder surgery      Allergies  No Known Allergies    SOCIAL HISTORY:  Denies ETOh,Smoking,     FAMILY HISTORY:  FH: type 2 diabetes        REVIEW OF SYSTEMS:  CONSTITUTIONAL: (+)generalized weakness, (+)loss of appetite, (+)pruritus  EYES/ENT: No visual changes;  No vertigo or throat pain   NECK: No pain or stiffness  RESPIRATORY: No cough, wheezing, hemoptysis; No shortness of breath  CARDIOVASCULAR: No chest pain or palpitations  GASTROINTESTINAL: No abdominal or epigastric pain. No nausea, vomiting, or hematemesis; No diarrhea or constipation. No melena or hematochezia.  GENITOURINARY: No dysuria, frequency or hematuria  NEUROLOGICAL: No numbness or weakness  SKIN: No itching, burning, rashes, or lesions   All other review of systems is negative unless indicated above.    VITAL:  T(C): --  T(F): --  HR: --  BP: --  BP(mean): --  RR: --  SpO2: --  Wt(kg): --    PHYSICAL EXAM:  Constitutional: NAD, Alert  HEENT: NCAT, MMM  Neck: Supple, No JVD  Respiratory: CTA-b/l  Cardiovascular: RRR s1s2, no m/r/g  Gastrointestinal: BS+, soft, NT/ND  : (+)suprapubic catheter  Extremities: no edema b/l  Neurologic: 0/5 LE strength b/l  Back: no CVAT b/l  Skin: No rashes, no nevi  HD access: none    LABS:  (1/17/25) - BUN 98, Cr 6.46, K 4.5, HCO3 21, Ca 7.4, Alb 3.4, TSat 17, Ferr 265, Hb 9.2  (1/6/25) - BUN 96, Cr 7.96, Na 134, K 3.2, HCO3 28, Ca 7.6, M 2.8, P 6.4, Hb 9.3  (12/27/24) - , Cr 8.34, K 3.6, HCO3 31, Ca 8.1, M 2.2, P 7.3, Hb 10.1  (12/20/24) - , Cr 7.51, Na 129, K 7.5, HCO3 17, Ca 8.4, M 2.5, P 7.1      ASSESSMENT:  (1)CKD - stage 5 - not yet on dialysis  (2)SONDRA - prerenally mediated in association with urosepsis? Complains of generalized weakness, loss of appetite, and worsening pruritus - this may all be due to uremia    RECOMMEND:  (1)IV crystalloids - NS 1-2L in boluses  (2)UA+UCx  (3)Comprehensive metabolic panel, Mag, Phos, HBV/HCV studies (in anticipation of potential need for HD)  (4)Meds for GFR<10    Thank you for involving [facility] in this patient's care.    With warm regards,    Rafita Denny MD   [facility]  Office: [phone]  Cell: [phone]             HPI: Mr. Delgado is a 74 year-old man with history of multiple medical issues including polio with associated neurogenic bladder/suprapubic catheter, iatrogenic rectal rupture requiring colostomy 2/2023, and stage 5 chronic kidney disease with baseline creatinine in the 6s. He was admitted at [facility] 12/20-12/25/24 with ischemic ATN with associated hyperkalemia and uremia; his creatinine peaked in the 9s. We were able to get by without dialysis during the admission. His creatinine slowly improved from that point forward and was as low as 6.46mg/dL on 1/17/25. I saw him at my office on Friday 1/31/25; at that time he felt as well as he has felt over the last few months. He went for suprapubic catheter exchange later in the day at [facility] with Dr. Dominique Martel. Since the procedure, he has developed worsening generalized weakness, fatigue, loss of appetite, and pruritus. His daughter Norma called me today and informed me of his symptoms; given my concern that he had developed a new bout of SONDRA and was uremic, I advised that he be brought to the ER. He and his wife add that he has significant suprapubic pain and that pus is emanating from the area. He denies fever or chills.         PAST MEDICAL & SURGICAL HISTORY:  Polio - wheelchair-dependent  Neurogenic bladder - suprapubic catheter  Erectile dysfunction  HTN  HLD  Colostomy/reversal  DVT  B/L LE fracture ==>ORIF  Cataract surgery - right  Shoulder surgery      Allergies  No Known Allergies    SOCIAL HISTORY:  Denies ETOh,Smoking,     FAMILY HISTORY:  FH: type 2 diabetes      REVIEW OF SYSTEMS:  CONSTITUTIONAL: (+)generalized weakness, (+)loss of appetite, (+)pruritus  EYES/ENT: No visual changes;  No vertigo or throat pain   NECK: No pain or stiffness  RESPIRATORY: No cough, wheezing, hemoptysis; No shortness of breath  CARDIOVASCULAR: No chest pain or palpitations  GASTROINTESTINAL: No abdominal or epigastric pain. No nausea, vomiting, or hematemesis; No diarrhea or constipation. No melena or hematochezia.  GENITOURINARY: (+)pustular discharge from exit site, (+)suprapubic pain  NEUROLOGICAL: No numbness or weakness  SKIN: No itching, burning, rashes, or lesions   All other review of systems is negative unless indicated above.    VITAL:  · BP Systolic	137 mm Hg  · BP Diastolic	 57 mm Hg  · Heart Rate	97 /min  · Respiration Rate (breaths/min)	18 /min  · Temp (F)	97.5 Degrees F  · Temp (C)	36.4 Degrees C  · Temp site	oral  · SpO2 (%)	94 %  · O2 Delivery/Oxygen Delivery Method	room air  · Temp at ED Arrival (C)	36.4 Degrees C    PHYSICAL EXAM:  Constitutional: NAD, Alert  HEENT: NCAT, MMM  Neck: Supple, No JVD  Respiratory: CTA-b/l  Cardiovascular: RRR s1s2, no m/r/g  Gastrointestinal: BS+, soft, NT/ND  : (+)suprapubic catheter  Extremities: no edema b/l  Neurologic: 0/5 LE strength b/l  Back: no CVAT b/l  Skin: No rashes, no nevi  HD access: none    LABS:  (1/17/25) - BUN 98, Cr 6.46, K 4.5, HCO3 21, Ca 7.4, Alb 3.4, TSat 17, Ferr 265, Hb 9.2  (1/6/25) - BUN 96, Cr 7.96, Na 134, K 3.2, HCO3 28, Ca 7.6, M 2.8, P 6.4, Hb 9.3  (12/27/24) - , Cr 8.34, K 3.6, HCO3 31, Ca 8.1, M 2.2, P 7.3, Hb 10.1  (12/20/24) - , Cr 7.51, Na 129, K 7.5, HCO3 17, Ca 8.4, M 2.5, P 7.1      ASSESSMENT:  (1)CKD - stage 5 - not yet on dialysis  (2)SONDRA - prerenally mediated in association with urosepsis? Complains of generalized weakness, loss of appetite, and worsening pruritus - this may all be due to uremia  (3) - complicated UTI    RECOMMEND:  (1)IV crystalloids - NS 1-2L in boluses  (2)UA+UCx  (3) eval  (4)Comprehensive metabolic panel, Mag, Phos, HBV/HCV studies (in anticipation of potential need for HD)  (5)Meds for GFR<10  (6)No HD just yet - may need HD this admission    Thank you for involving [facility] in this patient's care.    With warm regards,    Rafita Denny MD   [facility]  Office: [phone]  Cell: [phone]             HPI: Mr. Delgado is a 74 year-old man with history of multiple medical issues including polio with associated neurogenic bladder/suprapubic catheter, iatrogenic rectal rupture requiring colostomy 2/2023, and stage 5 chronic kidney disease with baseline creatinine in the 6s. He was admitted at [facility] 12/20-12/25/24 with ischemic ATN with associated hyperkalemia and uremia; his creatinine peaked in the 9s. We were able to get by without dialysis during the admission. His creatinine slowly improved from that point forward and was as low as 6.46mg/dL on 1/17/25. I saw him at my office in the a.m. on Friday 1/31/25; at that time he felt as well as he has felt over the last few months. He went for suprapubic catheter exchange later in the day at [facility] with Dr. Ace Martel. Since the procedure, he has developed worsening generalized weakness, fatigue, loss of appetite, and pruritus. His daughter Norma called me today and informed me of his symptoms; given my concern that he had developed a new bout of SONDRA and was uremic, I advised that he be brought to the ER. He and his wife add that he has significant suprapubic pain and that pus is emanating from the area. He denies fever or chills.         PAST MEDICAL & SURGICAL HISTORY:  Polio - wheelchair-dependent  Neurogenic bladder - suprapubic catheter  Erectile dysfunction  HTN  HLD  Colostomy/reversal  DVT  B/L LE fracture ==>ORIF  Cataract surgery - right  Shoulder surgery      Allergies  No Known Allergies    SOCIAL HISTORY:  Denies ETOh,Smoking,     FAMILY HISTORY:  FH: type 2 diabetes      REVIEW OF SYSTEMS:  CONSTITUTIONAL: (+)generalized weakness, (+)loss of appetite, (+)pruritus  EYES/ENT: No visual changes;  No vertigo or throat pain   NECK: No pain or stiffness  RESPIRATORY: No cough, wheezing, hemoptysis; No shortness of breath  CARDIOVASCULAR: No chest pain or palpitations  GASTROINTESTINAL: No abdominal or epigastric pain. No nausea, vomiting, or hematemesis; No diarrhea or constipation. No melena or hematochezia.  GENITOURINARY: (+)pustular discharge from exit site, (+)suprapubic pain  NEUROLOGICAL: No numbness or weakness  SKIN: No itching, burning, rashes, or lesions   All other review of systems is negative unless indicated above.    VITAL:  · BP Systolic	137 mm Hg  · BP Diastolic	 57 mm Hg  · Heart Rate	97 /min  · Respiration Rate (breaths/min)	18 /min  · Temp (F)	97.5 Degrees F  · Temp (C)	36.4 Degrees C  · Temp site	oral  · SpO2 (%)	94 %  · O2 Delivery/Oxygen Delivery Method	room air  · Temp at ED Arrival (C)	36.4 Degrees C    PHYSICAL EXAM:  Constitutional: NAD, Alert  HEENT: NCAT, MMM  Neck: Supple, No JVD  Respiratory: CTA-b/l  Cardiovascular: RRR s1s2, no m/r/g  Gastrointestinal: BS+, soft, NT/ND  : (+)suprapubic catheter  Extremities: no edema b/l  Neurologic: 0/5 LE strength b/l  Back: no CVAT b/l  Skin: No rashes, no nevi  HD access: none    LABS:  (1/17/25) - BUN 98, Cr 6.46, K 4.5, HCO3 21, Ca 7.4, Alb 3.4, TSat 17, Ferr 265, Hb 9.2  (1/6/25) - BUN 96, Cr 7.96, Na 134, K 3.2, HCO3 28, Ca 7.6, M 2.8, P 6.4, Hb 9.3  (12/27/24) - , Cr 8.34, K 3.6, HCO3 31, Ca 8.1, M 2.2, P 7.3, Hb 10.1  (12/20/24) - , Cr 7.51, Na 129, K 7.5, HCO3 17, Ca 8.4, M 2.5, P 7.1      ASSESSMENT:  (1)CKD - stage 5 - not yet on dialysis  (2)SONDRA - prerenally mediated in association with urosepsis? Complains of generalized weakness, loss of appetite, and worsening pruritus - this may all be due to uremia  (3) - complicated UTI    RECOMMEND:  (1)IV crystalloids - NS 1L bolus  (2)UA+UCx  (3) eval  (4)Comprehensive metabolic panel, Mag, Phos, HBV/HCV studies (in anticipation of potential need for HD)  (5)Meds for GFR<10  (6)No HD just yet - may need HD this admission    Thank you for involving [facility] in this patient's care.    With warm regards,    Rafita Denny MD   [facility]  Office: [phone]  Cell: [phone]

## 2025-02-04 NOTE — ED PROVIDER NOTE - OBJECTIVE STATEMENT
74 year-old man with history of multiple medical issues including polio with associated neurogenic bladder/suprapubic catheter (last exchanged 1/31), iatrogenic rectal rupture requiring colostomy 2/2023, and stage 5 chronic kidney disease, recent admission [facility] 12/20-12/25/24 with ischemic ATN with associated hyperkalemia and uremia (did not receive dialysis at that time) presents to the ED for eval of worsening generalized weakness, fatigue, loss of appetite, pruritus  and pain at suprapubic mcconnell site. Patient reports that he has not felt well since suprapubic Mcconnell was exchanged.  He has been producing urine but feels as if his bladder is not completely emptying with abdominal discomfort/pressure. He spoke to his nephrologist who recommended come to the ED.  He denies fevers, chills, chest pain, shortness of breath, nausea or vomiting.

## 2025-02-04 NOTE — ED ADULT NURSE NOTE - OBJECTIVE STATEMENT
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 12/20-12/25/24 with ischemic ATN with associated hyperkalemia and uremia (did not receive dialysis at that time) presents to the ED c/o  of worsening generalized weakness, fatigue, loss of appetite, pruritus  and pain at suprapubic mcconnell site. Patient c/o that he has not felt well since suprapubic Mcconnell was exchanged.  He has been producing urine but c/o "fullness sensation".

## 2025-02-04 NOTE — ED PROVIDER NOTE - PHYSICAL EXAMINATION
CONSTITUTIONAL: Patient is awake, alert and oriented x 3. Patient is well appearing and in no acute distress  HEAD: NCAT  NECK: supple, FROM  LUNGS: CTA b/l, no wheezing or rales   HEART: RRR.+S1S2   ABDOMEN: Suprapubic catheter in place with small amount drainage, no surrounding erythema,  soft, mildly distended, mild ttp surrounding suprapubic mcconnell site,  no rebound or guarding  EXTREMITY: No edema or calf tenderness b/l  SKIN: No rash or lesions  NEURO: Spontaneously moving UE b/l CONSTITUTIONAL: Patient is awake, alert and oriented x 3. Patient is well appearing and in no acute distress  HEAD: NCAT  NECK: supple, FROM  LUNGS: CTA b/l, no wheezing or rales   HEART: RRR.+S1S2   ABDOMEN: Suprapubic catheter in place with small amount drainage, no surrounding erythema,  soft, mildly distended, mild ttp surrounding suprapubic mcconnell site,  no rebound or guarding  EXTREMITY: No edema or calf tenderness b/l  SKIN: No rash or lesions  NEURO: Spontaneously moving UE b/l  Attending Astrid Denny: Gen: NAD, heent: atrauamtic, eomi, perrla, mmm, , neck; nttp, no nuchal rigidity, cv: rrr, no murmurs, lungs: ctab, abd: soft, ttp suprapubic area, suprapupiccatheter in place, with some drainage, no guarding, ext: wwp, h, neuro: awake and alert, following commands, speech clear

## 2025-02-04 NOTE — ED PROVIDER NOTE - CLINICAL SUMMARY MEDICAL DECISION MAKING FREE TEXT BOX
Attending Astrid Denny: 73 yo male with multiple medica lissues including h/o polio, CKD, bladder obstruction, with suprapubic catheter in place presenting with concern for abdominal pain and pain to catheter site. upon arrival pt hemodyncamilly stable. pt with ttp surroudning catheter site. catheter is draining. no fevers or chills. concernin for possible uti. pocus performed showing evidence of hydronephrosis. concern for possible worsening ckd, vs uti, .urology consulted to evaluate. nephrology aware as well.w ill obtain ct abd/pel to further evaluate.

## 2025-02-04 NOTE — ED PROVIDER NOTE - ATTENDING APP SHARED VISIT CONTRIBUTION OF CARE
Attending MD Astrid Denny:  I personally made/approved the management plan and take responsibility for the patient management.  Physician assistant note reviewed and agree on plan of care and except where noted.  See HPI, PE, and MDM for details.

## 2025-02-04 NOTE — ED PROVIDER NOTE - NS ED ATTENDING STATEMENT MOD
[Normal] : patient has a normal gait including toe-walking, heel-walking and tandem walking. Romberg sign is negative. [de-identified] : Fairly nourished, fairly developed [de-identified] : Fundi- normal [de-identified] : alert, cooperative, answers to questions, sits still This was a shared visit with the FANTAS[facility]. I reviewed and verified the documentation.

## 2025-02-04 NOTE — ED ADULT NURSE NOTE - NSFALLRISKINTERV_ED_ALL_ED

## 2025-02-05 DIAGNOSIS — N39.0 URINARY TRACT INFECTION, SITE NOT SPECIFIED: ICD-10-CM

## 2025-02-05 LAB
ADD ON TEST-SPECIMEN IN LAB: SIGNIFICANT CHANGE UP
ALBUMIN SERPL ELPH-MCNC: 3.2 G/DL — LOW (ref 3.3–5)
ALP SERPL-CCNC: 104 U/L — SIGNIFICANT CHANGE UP (ref 40–120)
ALT FLD-CCNC: 8 U/L — LOW (ref 10–45)
ANION GAP SERPL CALC-SCNC: 21 MMOL/L — HIGH (ref 5–17)
ANION GAP SERPL CALC-SCNC: 21 MMOL/L — HIGH (ref 5–17)
APPEARANCE UR: ABNORMAL
APTT BLD: 30.7 SEC — SIGNIFICANT CHANGE UP (ref 24.5–35.6)
AST SERPL-CCNC: 7 U/L — LOW (ref 10–40)
BACTERIA # UR AUTO: NEGATIVE /HPF — SIGNIFICANT CHANGE UP
BASOPHILS # BLD AUTO: 0 K/UL — SIGNIFICANT CHANGE UP (ref 0–0.2)
BASOPHILS NFR BLD AUTO: 0 % — SIGNIFICANT CHANGE UP (ref 0–2)
BILIRUB SERPL-MCNC: 0.2 MG/DL — SIGNIFICANT CHANGE UP (ref 0.2–1.2)
BILIRUB UR-MCNC: NEGATIVE — SIGNIFICANT CHANGE UP
BUN SERPL-MCNC: 100 MG/DL — HIGH (ref 7–23)
BUN SERPL-MCNC: 99 MG/DL — HIGH (ref 7–23)
CALCIUM SERPL-MCNC: 7.7 MG/DL — LOW (ref 8.4–10.5)
CALCIUM SERPL-MCNC: 7.9 MG/DL — LOW (ref 8.4–10.5)
CAST: 0 /LPF — SIGNIFICANT CHANGE UP (ref 0–4)
CHLORIDE SERPL-SCNC: 96 MMOL/L — SIGNIFICANT CHANGE UP (ref 96–108)
CHLORIDE SERPL-SCNC: 98 MMOL/L — SIGNIFICANT CHANGE UP (ref 96–108)
CO2 SERPL-SCNC: 12 MMOL/L — LOW (ref 22–31)
CO2 SERPL-SCNC: 15 MMOL/L — LOW (ref 22–31)
COLOR SPEC: YELLOW — SIGNIFICANT CHANGE UP
CREAT ?TM UR-MCNC: 8 MG/DL — SIGNIFICANT CHANGE UP
CREAT SERPL-MCNC: 7.74 MG/DL — HIGH (ref 0.5–1.3)
CREAT SERPL-MCNC: 7.98 MG/DL — HIGH (ref 0.5–1.3)
DIFF PNL FLD: ABNORMAL
EGFR: 7 ML/MIN/1.73M2 — LOW
EGFR: 7 ML/MIN/1.73M2 — LOW
EOSINOPHIL # BLD AUTO: 0.09 K/UL — SIGNIFICANT CHANGE UP (ref 0–0.5)
EOSINOPHIL NFR BLD AUTO: 0.9 % — SIGNIFICANT CHANGE UP (ref 0–6)
GAS PNL BLDV: SIGNIFICANT CHANGE UP
GIANT PLATELETS BLD QL SMEAR: PRESENT — SIGNIFICANT CHANGE UP
GLUCOSE SERPL-MCNC: 115 MG/DL — HIGH (ref 70–99)
GLUCOSE SERPL-MCNC: 126 MG/DL — HIGH (ref 70–99)
GLUCOSE UR QL: NEGATIVE MG/DL — SIGNIFICANT CHANGE UP
HAV IGM SER-ACNC: SIGNIFICANT CHANGE UP
HBV CORE IGM SER-ACNC: SIGNIFICANT CHANGE UP
HBV SURFACE AG SER-ACNC: SIGNIFICANT CHANGE UP
HCV AB S/CO SERPL IA: 0.2 S/CO — SIGNIFICANT CHANGE UP (ref 0–0.99)
HCV AB SERPL-IMP: SIGNIFICANT CHANGE UP
INR BLD: 1.4 RATIO — HIGH (ref 0.85–1.16)
KETONES UR-MCNC: NEGATIVE MG/DL — SIGNIFICANT CHANGE UP
LEUKOCYTE ESTERASE UR-ACNC: ABNORMAL
LYMPHOCYTES # BLD AUTO: 0.76 K/UL — LOW (ref 1–3.3)
LYMPHOCYTES # BLD AUTO: 7.8 % — LOW (ref 13–44)
MANUAL SMEAR VERIFICATION: SIGNIFICANT CHANGE UP
MONOCYTES # BLD AUTO: 0.34 K/UL — SIGNIFICANT CHANGE UP (ref 0–0.9)
MONOCYTES NFR BLD AUTO: 3.5 % — SIGNIFICANT CHANGE UP (ref 2–14)
NEUTROPHILS # BLD AUTO: 8.6 K/UL — HIGH (ref 1.8–7.4)
NEUTROPHILS NFR BLD AUTO: 87.8 % — HIGH (ref 43–77)
NITRITE UR-MCNC: NEGATIVE — SIGNIFICANT CHANGE UP
OSMOLALITY SERPL: 317 MOSMOL/KG — HIGH (ref 280–301)
OSMOLALITY UR: 289 MOS/KG — LOW (ref 300–900)
PH UR: 6 — SIGNIFICANT CHANGE UP (ref 5–8)
PHOSPHATE SERPL-MCNC: 8.2 MG/DL — HIGH (ref 2.5–4.5)
PLAT MORPH BLD: NORMAL — SIGNIFICANT CHANGE UP
POIKILOCYTOSIS BLD QL AUTO: SLIGHT — SIGNIFICANT CHANGE UP
POTASSIUM SERPL-MCNC: 5.1 MMOL/L — SIGNIFICANT CHANGE UP (ref 3.5–5.3)
POTASSIUM SERPL-MCNC: 5.7 MMOL/L — HIGH (ref 3.5–5.3)
POTASSIUM SERPL-SCNC: 5.1 MMOL/L — SIGNIFICANT CHANGE UP (ref 3.5–5.3)
POTASSIUM SERPL-SCNC: 5.7 MMOL/L — HIGH (ref 3.5–5.3)
POTASSIUM UR-SCNC: 6 MMOL/L — SIGNIFICANT CHANGE UP
PROT ?TM UR-MCNC: 124 MG/DL — HIGH (ref 0–12)
PROT SERPL-MCNC: 6.6 G/DL — SIGNIFICANT CHANGE UP (ref 6–8.3)
PROT UR-MCNC: 100 MG/DL
PROT/CREAT UR-RTO: 15.5 RATIO — HIGH (ref 0–0.2)
PROTHROM AB SERPL-ACNC: 15.9 SEC — HIGH (ref 9.9–13.4)
RBC BLD AUTO: SIGNIFICANT CHANGE UP
RBC CASTS # UR COMP ASSIST: 28 /HPF — HIGH (ref 0–4)
SODIUM SERPL-SCNC: 131 MMOL/L — LOW (ref 135–145)
SODIUM SERPL-SCNC: 132 MMOL/L — LOW (ref 135–145)
SODIUM UR-SCNC: 132 MMOL/L — SIGNIFICANT CHANGE UP
SP GR SPEC: 1.01 — SIGNIFICANT CHANGE UP (ref 1–1.03)
SQUAMOUS # UR AUTO: 2 /HPF — SIGNIFICANT CHANGE UP (ref 0–5)
UROBILINOGEN FLD QL: 0.2 MG/DL — SIGNIFICANT CHANGE UP (ref 0.2–1)
UUN UR-MCNC: 79 MG/DL — SIGNIFICANT CHANGE UP
WBC UR QL: 767 /HPF — HIGH (ref 0–5)

## 2025-02-05 PROCEDURE — 74176 CT ABD & PELVIS W/O CONTRAST: CPT | Mod: 26

## 2025-02-05 PROCEDURE — 99222 1ST HOSP IP/OBS MODERATE 55: CPT

## 2025-02-05 RX ORDER — SODIUM BICARBONATE 42 MG/ML
1300 INJECTION, SOLUTION INTRAVENOUS THREE TIMES A DAY
Refills: 0 | Status: DISCONTINUED | OUTPATIENT
Start: 2025-02-05 | End: 2025-02-06

## 2025-02-05 RX ORDER — DIPHENHYDRAMINE HCL 25 MG
50 CAPSULE ORAL ONCE
Refills: 0 | Status: COMPLETED | OUTPATIENT
Start: 2025-02-05 | End: 2025-02-05

## 2025-02-05 RX ORDER — ACETAMINOPHEN 160 MG/5ML
1000 SUSPENSION ORAL ONCE
Refills: 0 | Status: COMPLETED | OUTPATIENT
Start: 2025-02-05 | End: 2025-02-05

## 2025-02-05 RX ORDER — SODIUM BICARBONATE 42 MG/ML
50 INJECTION, SOLUTION INTRAVENOUS ONCE
Refills: 0 | Status: COMPLETED | OUTPATIENT
Start: 2025-02-05 | End: 2025-02-05

## 2025-02-05 RX ORDER — SODIUM ZIRCONIUM CYCLOSILICATE 5 G/5G
5 POWDER, FOR SUSPENSION ORAL DAILY
Refills: 0 | Status: DISCONTINUED | OUTPATIENT
Start: 2025-02-05 | End: 2025-02-07

## 2025-02-05 RX ORDER — HYDROCORTISONE 1 %
1 CREAM (GRAM) TOPICAL
Refills: 0 | Status: DISCONTINUED | OUTPATIENT
Start: 2025-02-05 | End: 2025-02-07

## 2025-02-05 RX ORDER — KETOCONAZOLE 20 MG/G
1 AEROSOL, FOAM TOPICAL DAILY
Refills: 0 | Status: DISCONTINUED | OUTPATIENT
Start: 2025-02-05 | End: 2025-02-05

## 2025-02-05 RX ORDER — MEROPENEM 500 MG/20ML
500 INJECTION INTRAVENOUS ONCE
Refills: 0 | Status: COMPLETED | OUTPATIENT
Start: 2025-02-05 | End: 2025-02-05

## 2025-02-05 RX ORDER — PIPERACILLIN SODIUM AND TAZOBACTAM SODIUM 2; 250 G/50ML; MG/50ML
3.38 INJECTION, POWDER, FOR SOLUTION INTRAVENOUS ONCE
Refills: 0 | Status: COMPLETED | OUTPATIENT
Start: 2025-02-05 | End: 2025-02-05

## 2025-02-05 RX ORDER — OXYCODONE HYDROCHLORIDE 30 MG/1
5 TABLET ORAL ONCE
Refills: 0 | Status: DISCONTINUED | OUTPATIENT
Start: 2025-02-05 | End: 2025-02-05

## 2025-02-05 RX ORDER — OXYCODONE HYDROCHLORIDE 30 MG/1
10 TABLET ORAL ONCE
Refills: 0 | Status: DISCONTINUED | OUTPATIENT
Start: 2025-02-05 | End: 2025-02-05

## 2025-02-05 RX ORDER — MEROPENEM 500 MG/20ML
INJECTION INTRAVENOUS
Refills: 0 | Status: DISCONTINUED | OUTPATIENT
Start: 2025-02-05 | End: 2025-02-05

## 2025-02-05 RX ORDER — KETOCONAZOLE 20 MG/G
1 AEROSOL, FOAM TOPICAL
Refills: 0 | Status: DISCONTINUED | OUTPATIENT
Start: 2025-02-05 | End: 2025-02-07

## 2025-02-05 RX ORDER — ATORVASTATIN CALCIUM 80 MG/1
40 TABLET, FILM COATED ORAL AT BEDTIME
Refills: 0 | Status: DISCONTINUED | OUTPATIENT
Start: 2025-02-05 | End: 2025-02-07

## 2025-02-05 RX ORDER — DM/PSEUDOEPHED/ACETAMINOPHEN 10-30-250
50 CAPSULE ORAL ONCE
Refills: 0 | Status: COMPLETED | OUTPATIENT
Start: 2025-02-05 | End: 2025-02-05

## 2025-02-05 RX ORDER — CEFTRIAXONE 250 MG/1
2000 INJECTION, POWDER, FOR SOLUTION INTRAMUSCULAR; INTRAVENOUS EVERY 24 HOURS
Refills: 0 | Status: DISCONTINUED | OUTPATIENT
Start: 2025-02-05 | End: 2025-02-07

## 2025-02-05 RX ORDER — ACETAMINOPHEN 160 MG/5ML
650 SUSPENSION ORAL ONCE
Refills: 0 | Status: COMPLETED | OUTPATIENT
Start: 2025-02-05 | End: 2025-02-05

## 2025-02-05 RX ORDER — BACTERIOSTATIC SODIUM CHLORIDE 0.9 %
250 VIAL (ML) INJECTION ONCE
Refills: 0 | Status: COMPLETED | OUTPATIENT
Start: 2025-02-05 | End: 2025-02-05

## 2025-02-05 RX ORDER — VANCOMYCIN HYDROCHLORIDE 50 MG/ML
1000 KIT ORAL ONCE
Refills: 0 | Status: DISCONTINUED | OUTPATIENT
Start: 2025-02-05 | End: 2025-02-05

## 2025-02-05 RX ORDER — VANCOMYCIN HYDROCHLORIDE 50 MG/ML
KIT ORAL
Refills: 0 | Status: DISCONTINUED | OUTPATIENT
Start: 2025-02-05 | End: 2025-02-05

## 2025-02-05 RX ORDER — CALCIUM ACETATE 667 MG/1
1334 CAPSULE ORAL
Refills: 0 | Status: DISCONTINUED | OUTPATIENT
Start: 2025-02-05 | End: 2025-02-07

## 2025-02-05 RX ORDER — SODIUM ZIRCONIUM CYCLOSILICATE 5 G/5G
10 POWDER, FOR SUSPENSION ORAL ONCE
Refills: 0 | Status: COMPLETED | OUTPATIENT
Start: 2025-02-05 | End: 2025-02-05

## 2025-02-05 RX ORDER — HEPARIN SODIUM,PORCINE 10000/ML
5000 VIAL (ML) INJECTION EVERY 8 HOURS
Refills: 0 | Status: DISCONTINUED | OUTPATIENT
Start: 2025-02-05 | End: 2025-02-07

## 2025-02-05 RX ADMIN — ATORVASTATIN CALCIUM 40 MILLIGRAM(S): 80 TABLET, FILM COATED ORAL at 22:09

## 2025-02-05 RX ADMIN — SODIUM BICARBONATE 1300 MILLIGRAM(S): 42 INJECTION, SOLUTION INTRAVENOUS at 22:09

## 2025-02-05 RX ADMIN — CEFTRIAXONE 100 MILLIGRAM(S): 250 INJECTION, POWDER, FOR SOLUTION INTRAMUSCULAR; INTRAVENOUS at 18:32

## 2025-02-05 RX ADMIN — Medication 250 MILLILITER(S): at 00:56

## 2025-02-05 RX ADMIN — MEROPENEM 500 MILLIGRAM(S): 500 INJECTION INTRAVENOUS at 14:38

## 2025-02-05 RX ADMIN — PIPERACILLIN SODIUM AND TAZOBACTAM SODIUM 200 GRAM(S): 2; 250 INJECTION, POWDER, FOR SOLUTION INTRAVENOUS at 03:46

## 2025-02-05 RX ADMIN — SODIUM ZIRCONIUM CYCLOSILICATE 5 GRAM(S): 5 POWDER, FOR SUSPENSION ORAL at 13:45

## 2025-02-05 RX ADMIN — Medication 50 MILLIGRAM(S): at 04:20

## 2025-02-05 RX ADMIN — ACETAMINOPHEN 650 MILLIGRAM(S): 160 SUSPENSION ORAL at 14:38

## 2025-02-05 RX ADMIN — OXYCODONE HYDROCHLORIDE 5 MILLIGRAM(S): 30 TABLET ORAL at 22:09

## 2025-02-05 RX ADMIN — SODIUM ZIRCONIUM CYCLOSILICATE 10 GRAM(S): 5 POWDER, FOR SUSPENSION ORAL at 00:57

## 2025-02-05 RX ADMIN — CALCIUM ACETATE 1334 MILLIGRAM(S): 667 CAPSULE ORAL at 18:33

## 2025-02-05 RX ADMIN — Medication 100 GRAM(S): at 00:54

## 2025-02-05 RX ADMIN — Medication 5 UNIT(S): at 00:51

## 2025-02-05 RX ADMIN — Medication 1 APPLICATION(S): at 08:01

## 2025-02-05 RX ADMIN — OXYCODONE HYDROCHLORIDE 5 MILLIGRAM(S): 30 TABLET ORAL at 23:00

## 2025-02-05 RX ADMIN — ACETAMINOPHEN 400 MILLIGRAM(S): 160 SUSPENSION ORAL at 01:47

## 2025-02-05 RX ADMIN — CALCIUM ACETATE 1334 MILLIGRAM(S): 667 CAPSULE ORAL at 08:01

## 2025-02-05 RX ADMIN — CALCIUM ACETATE 1334 MILLIGRAM(S): 667 CAPSULE ORAL at 13:46

## 2025-02-05 RX ADMIN — SODIUM BICARBONATE 50 MILLIEQUIVALENT(S): 42 INJECTION, SOLUTION INTRAVENOUS at 00:52

## 2025-02-05 RX ADMIN — Medication 5000 UNIT(S): at 22:09

## 2025-02-05 RX ADMIN — SODIUM BICARBONATE 1300 MILLIGRAM(S): 42 INJECTION, SOLUTION INTRAVENOUS at 13:46

## 2025-02-05 RX ADMIN — Medication 50 MILLILITER(S): at 00:50

## 2025-02-05 RX ADMIN — OXYCODONE HYDROCHLORIDE 10 MILLIGRAM(S): 30 TABLET ORAL at 01:42

## 2025-02-05 NOTE — PROGRESS NOTE ADULT - ATTENDING COMMENTS
NGB, Cellulitis around SPT and UTI  No obvious abscess  No acute  intervention  Cont iv abx, f/u cxs    Please re-consult prn, pt should follow up with regular urologist Dr Martel at .

## 2025-02-05 NOTE — CONSULT NOTE ADULT - ASSESSMENT
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  Patient s/p recent dilation and placement of SPT 1/31/25. Draining purulent yellow urine with debris. Circumferential erythema on the skin around tube site TTP.   Denies fevers at home, afebrile in ER, VSS   Labs WBC 9.79 / HCt 31.3 / SCr 7.98 / Lactate 1.4   CT pending     Recs  - Patient with recent dilation and placement of SPT --> DO NOT REMOVE SPT without paging UROLOGY FIRST   - Empiric antibiotics - patient recently underwent a course of Cipro   - Send urine sample and UCx from SPT   - Irrigate SPT PRN with NS to prevent debris obstruction   - Monitor urine output   - Will follow-up CT scan     [facility]  [address] [place] [address], [address]  [place] [address] [phone]      74y Male with PMHx of multiple medical issues including polio with associated neurogenic bladder/suprapubic catheter (last exchanged 1/31), iatrogenic rectal rupture 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  Patient s/p recent dilation and placement of SPT 1/31/25. Draining purulent yellow urine with debris. Circumferential erythema on the skin around tube site TTP.   Denies fevers at home, afebrile in ER, VSS   Labs WBC 9.79 / HCt 31.3 / SCr 7.98 / Lactate 1.4   CT pending     Recs  - Patient with recent dilation and placement of SPT --> DO NOT REMOVE SPT without paging UROLOGY FIRST   - Empiric antibiotics - patient recently underwent a course of Cipro   - Send urine sample and UCx from SPT   - Irrigate SPT PRN with NS to prevent debris obstruction   - Monitor urine output   - Trospium for bladder spasms  - Will follow-up CT scan     [facility]  [address] [place] [address], [address]  [place] [address]  [phone]      74y Male with PMHx of multiple medical issues including polio with associated neurogenic bladder/suprapubic catheter (last exchanged 1/31), 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  Patient s/p recent dilation and placement of SPT 1/31/25. Draining purulent yellow urine with debris. Circumferential erythema on the skin around tube site TTP.   Denies fevers at home, afebrile in ER, VSS   Labs WBC 9.79 / HCt 31.3 / SCr 7.98 / Lactate 1.4   CT pending     Recs  - Patient with recent dilation and placement of SPT --> DO NOT REMOVE SPT without paging UROLOGY FIRST   - Empiric antibiotics - patient recently underwent a course of Cipro   - Send urine sample and UCx from SPT   - F/U BCxs   - Irrigate SPT PRN with NS to prevent debris obstruction   - Monitor urine output   - Trospium for bladder spasms  - Will follow-up CT scan   - Dispo: recommending medicine admit for IV antibiotics for cellulitis around SPT     [facility]  [address] [place] [address], [address]  [place] [address] [phone]      74y Male with PMHx of multiple medical issues including polio with associated neurogenic bladder/suprapubic catheter (last exchanged 1/31), iatrogenic rectal rupture 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  Patient s/p recent dilation and placement of SPT 1/31/25. Draining purulent yellow urine with debris. Circumferential erythema on the skin around tube site TTP.   Denies fevers at home, afebrile in ER, VSS   Labs WBC 9.79 / HCt 31.3 / SCr 7.98 / Lactate 1.4   CT pending     Recs  - No acute urologic intervention   - Patient with recent dilation and placement of SPT --> DO NOT REMOVE SPT without paging UROLOGY FIRST   - Empiric antibiotics - patient recently underwent a course of Cipro   - Send urine sample and UCx from SPT   - F/U BCxs   - Irrigate SPT PRN with NS to prevent debris obstruction   - Monitor urine output   - Trospium for bladder spasms  - Will follow-up CT scan   - Dispo: consider medicine admit for IV antibiotics for cellulitis around SPT     [facility]  [address] [place] [address], [address]  [place] [address] [phone]      74y Male with PMHx of multiple medical issues including polio with associated neurogenic bladder/suprapubic catheter (last exchanged 1/31), iatrogenic rectal rupture requiring colostomy 2/2023, and stage 5 chronic kidney disease, recent admission [facility] 12/20-12/25/24 with ischemic ATN with associated hyperkalemia and uremia (did not receive dialysis at that time) presents to the ED for eval of worsening generalized weakness, fatigue, loss of appetite, pruritus  and pain at suprapubic mcconnell site.  Patient s/p recent dilation and placement of SPT 1/31/25. Draining purulent yellow urine with debris. Circumferential erythema on the skin around tube site TTP.   Denies fevers at home, afebrile in ER, VSS   Labs WBC 9.79 / HCt 31.3 / SCr 7.98 / Lactate 1.4   CT with Moderate B/L hydro, mildly improved, w/ ureteral wall thickening and periureteral fat stranding c/f UTI. Diffuse urinary bladderwall thickening and perivesicular fat stranding c/f cystitis. Trace intraluminal air may be related to cystitis or instrumentation. Skin thickening with subcutaneous fat stranding is seen surrounding SPT c/f cellulitis.      Recs  - No acute urologic intervention   - Patient with recent dilation and placement of SPT --> DO NOT REMOVE SPT without paging UROLOGY FIRST   - Empiric antibiotics - patient recently underwent a course of Cipro   - Send urine sample and UCx from SPT   - F/U BCxs   - Irrigate SPT PRN with NS to prevent debris obstruction   - Monitor urine output   - Trospium for bladder spasms  - CT scan showing urinary infection and SQ fat stranding concerning for  cellulitis   - Dispo: consider medicine admit for IV antibiotics for infection and cellulitis around SPT     [facility]  [address] [place] [address], [address]  [place] [address] [phone]

## 2025-02-05 NOTE — CONSULT NOTE ADULT - SUBJECTIVE AND OBJECTIVE BOX
[facility]   SABINO Griffin S. Shah, Y. Patel, G. Casimir  [phone]   weekends and after hours [phone]    BRIAN DEMPSEY  74y, Male  [id number]    HPI--  HPI:  74 year-old man with history of multiple medical issues including polio with associated neurogenic bladder/suprapubic catheter (last exchanged 1/31), iatrogenic rectal rupture requiring colostomy 2/2023, and stage 5 chronic kidney disease, recent admission [facility] 12/20-12/25/24 with ischemic ATN with associated hyperkalemia and uremia (did not receive dialysis at that time) presents to the ED for eval of worsening generalized weakness, fatigue, loss of appetite, pruritus  and pain at suprapubic mcconnell site.   Patient reports that he has not felt well since suprapubic Mcconnell was exchanged.  He has been producing urine but feels as if his bladder is not completely emptying with abdominal discomfort/pressure. He spoke to his nephrologist who recommended come to the ED.  He denies fevers, chills, chest pain, shortness of breath, nausea or vomiting.  Patient s/p recent dilation and placement of SPT 1/31/25. Draining purulent yellow urine with debris. Circumferential erythema on the skin around tube site TTP.   Ptn is well known to me from previous admission, he and his wife requested to be admitted to my service, his PCP dr Denny requested for the ptn to be admitted to my service    ED findings: Labs WBC 9.79 / HCt 31.3 / SCr 7.98 / Lactate 1.4   CT A/P with Moderate B/L hydro, mildly improved, w/ ureteral wall thickening and periureteral fat stranding c/f UTI. Diffuse urinary bladder wall thickening and perivesicular fat stranding c/f cystitis. Trace intraluminal air may be related to cystitis or instrumentation.  (05 Feb 2025 09:09)      PMH/PSH--  Polio    Diabetes    Pulmonary hypertension    Hypertension    H/O urinary retention    HLD (hyperlipidemia)    BPH with obstruction/lower urinary tract symptoms    Type 2 diabetes mellitus    Erectile dysfunction    Bladder outlet obstruction    Presence of suprapubic catheter    Colostomy status    DVT, lower extremity    H/O cardiac murmur    No significant past surgical history    S/P colostomy    Suprapubic catheter    H/O total hip arthroplasty, right    Presence of internal fixation gianluca in upper extremity    S/P ORIF (open reduction internal fixation) fracture    S/P cataract surgery    H/O shoulder surgery        Allergies--  No Known Allergies      Medications--  Antibiotics: meropenem  IVPB      vancomycin  IVPB 1000 milliGRAM(s) IV Intermittent once    Immunologic:   Other: atorvastatin  calcium acetate  heparin   Injectable  hydrocortisone 1% Cream PRN  hydrOXYzine hydrochloride PRN  ketoconazole 2% Shampoo  sodium bicarbonate  sodium zirconium cyclosilicate      Social History--  EtOH: denies ***  Tobacco: denies ***  Drug Use: denies ***    Family/Marital History--  No pertinent family history in first degree relatives    FH: type 2 diabetes          Travel/Environmental/Occupational History:  *** inserth T/E/O Hx ***    Review of Systems:  REVIEW OF SYSTEMS  General: no fever, no chills, no wt loss	  Ophthalmologic: no blurry vision  Respiratory and Thorax: no cough, no dyspnea  Cardiovascular: no chest pain, no palpitations  Gastrointestinal:  no nausea, no vomiting, diarrhea  Genitourinary: no dysuria, no urgency, no frequency	  Musculoskeletal: no myalgias	  Neurological:  no headache	    Physical Exam--  Vital Signs: T(F): 97.3 (02-05-25 @ 13:53), Max: 98.5 (02-05-25 @ 03:52)  HR: 84 (02-05-25 @ 13:53)  BP: 162/97 (02-05-25 @ 13:53)  RR: 18 (02-05-25 @ 13:53)  SpO2: 96% (02-05-25 @ 13:53)  Wt(kg): --  General: Nontoxic-appearing Male in no acute distress.  HEENT: AT/NC. PERRL. EOMI. Anicteric. Conjunctiva pink and moist. Oropharynx clear. Dentition fair.  Neck: Not rigid. No sense of mass.  Nodes: None palpable.  Lungs: Clear bilaterally without rales, wheezing or rhonchi  Heart: Regular rate and rhythm. No Murmur. No rub. No gallop. No palpable thrill.  Abdomen: Bowel sounds present and normoactive. Soft. Nondistended. Nontender. No sense of mass. No organomegaly.  Back: No spinal tenderness. No costovertebral angle tenderness.   Extremities: No cyanosis or clubbing. No edema.   Skin: Warm. Dry. Good turgor. No rash. No vasculitic stigmata.  Psychiatric: Appropriate affect and mood for situation.         Laboratory & Imaging Data--  CBC                        9.8    9.79  )-----------( 284      ( 04 Feb 2025 23:40 )             31.3       Chemistries  02-05    131[L]  |  98  |  100[H]  ----------------------------<  115[H]  5.1   |  12[L]  |  7.74[H]    Ca    7.7[L]      05 Feb 2025 03:54  Phos  8.2     02-05  Mg     2.5     02-04    TPro  6.6  /  Alb  3.2[L]  /  TBili  0.2  /  DBili  x   /  AST  7[L]  /  ALT  8[L]  /  AlkPhos  104  02-04      Culture Data           [facility]   SABINO Griffin S. Shah, Y. Patel, G. Casimir  [phone]   weekends and after hours [phone]    BRIAN DEMPSEY  74y, Male  [id number]    HPI--  HPI:  74 year-old man with ckd , polio , paraplegia with associated neurogenic bladder/suprapubic catheter (last exchanged 1/31)presents to the ED for pressure and pain in groin following supra pubic cath change on 1/31  Patient reports that he has not felt well since suprapubic Murillo was exchanged.  He has been producing urine but feels as if his bladder is not completely emptying with abdominal discomfort/pressure.   recently, he has been on 2 course of antibx for skin infection around supra pubic cath.  states he feels pressure in lower region, no urine from penis  He denies fevers, chills, chest pain, shortness of breath, no cough.  nausea or vomiting.    recent admission [facility] 12/20-12/25/24 with ischemic ATN with associated hyperkalemia and uremia (did not receive dialysis at that time)     PMH/PSH--  Polio  Diabetes  Pulmonary hypertension  Hypertension  H/O urinary retention  HLD (hyperlipidemia)  BPH with obstruction/lower urinary tract symptoms  Type 2 diabetes mellitus  Erectile dysfunction  Bladder outlet obstruction  Presence of suprapubic catheter  Colostomy status  DVT, lower extremity  H/O cardiac murmur  S/P colostomy  Suprapubic catheter  H/O total hip arthroplasty, right  Presence of internal fixation gianluca in upper extremity  S/P ORIF (open reduction internal fixation) fracture  S/P cataract surgery  H/O shoulder surgery        Allergies--  No Known Allergies      Medications--  Antibiotics: meropenem  IVPB      vancomycin  IVPB 1000 milliGRAM(s) IV Intermittent once    Immunologic:   Other: atorvastatin  calcium acetate  heparin   Injectable  hydrocortisone 1% Cream PRN  hydrOXYzine hydrochloride PRN  ketoconazole 2% Shampoo  sodium bicarbonate  sodium zirconium cyclosilicate      Social History--  EtOH: denies ***  Tobacco: denies ***  Drug Use: denies ***    Family/Marital History--     FH: type 2 diabetes          Travel/Environmental/[place]up[place] History:  [place]    Review of Systems:  REVIEW OF SYSTEMS  General: no fever, no chills, no wt loss	  Ophthalmologic: no blurry vision  Respiratory and Thorax: no cough, no dyspnea  Cardiovascular: no chest pain, no palpitations  Gastrointestinal:  no nausea, no vomiting, diarrhea  Genitourinary: no dysuria, no urgency, no frequency	  Musculoskeletal: no myalgias	  Neurological:  no headache	    Physical Exam--  Vital Signs: T(F): 97.3 (02-05-25 @ 13:53), Max: 98.5 (02-05-25 @ 03:52)  HR: 84 (02-05-25 @ 13:53)  BP: 162/97 (02-05-25 @ 13:53)  RR: 18 (02-05-25 @ 13:53)  SpO2: 96% (02-05-25 @ 13:53)  Wt(kg): --  General: Nontoxic-appearing Male in no acute distress.  HEENT: AT/NC.   Neck: Not rigid. No sense of mass.  Nodes: None palpable.  Lungs: Clear bilaterally without rales, wheezing or rhonchi  Heart: Regular rate and rhythm.   Abdomen: Bowel sounds present and normoactive. Soft. Nondistended. small area with stitches noted in previous ostomy site  suprapubic cath with surrounding erythema, tender, mild fibrinous drainage  Back: No spinal tenderness. No costovertebral angle tenderness.   Extremities: No cyanosis or clubbing. No edema.   Skin: Warm. Dry. Good turgor. No rash. No vasculitic stigmata.  Psychiatric: Appropriate affect and mood for situation.         Laboratory & Imaging Data--  CBC                        9.8    9.79  )-----------( 284      ( 04 Feb 2025 23:40 )             31.3       Chemistries  02-05    131[L]  |  98  |  100[H]  ----------------------------<  115[H]  5.1   |  12[L]  |  7.74[H]    Ca    7.7[L]      05 Feb 2025 03:54  Phos  8.2     02-05  Mg     2.5     02-04    TPro  6.6  /  Alb  3.2[L]  /  TBili  0.2  /  DBili  x   /  AST  7[L]  /  ALT  8[L]  /  AlkPhos  104  02-04      Culture Data        < from: CT Abdomen and Pelvis No Cont (02.05.25 @ 02:57) >    ACC: [id number] EXAM:  CT ABDOMEN AND PELVIS   ORDERED BY: VERITO ROB     PROCEDURE DATE:  02/05/2025          INTERPRETATION:  CLINICAL INFORMATION: Abdominal pain.    COMPARISON: CT abdomen pelvis 2/4/2024. Renal ultrasound 2/4/2024.    CONTRAST/COMPLICATIONS:  IV Contrast: NONE  Oral Contrast: NONE    PROCEDURE:  CT of the Abdomen and Pelvis was performed.  Sagittal and coronal reformats were performed.    FINDINGS:  LOWER CHEST: Left lower lobe subsegmental atelectasis. Coronary artery   calcifications.    LIVER: A few tiny calcified granulomas.  BILE DUCTS: Normal caliber.  GALLBLADDER: Cholelithiasis in a distended gallbladder.  SPLEEN: Mild splenomegaly, measuring 13.8 cm in greatest dimension,   similar to prior study and of unknown significance.  PANCREAS: Within normal limits.  ADRENALS: Within normal limits.  KIDNEYS/URETERS: Moderate/severe bilateral hydroureteronephrosis, mildly   mildly improved since the prior study. Bilateral ureteral wall thickening   with surrounding fat stranding. Unchanged 3.8 cm right renal exophytic   lesion measuring greater than water density, which was described as a   cyst on the renal ultrasound.    BLADDER: Suprapubic catheter in place. Diffuse bladder wall thickening   and mild perivesicular fat stranding. Trace intraluminal air.  REPRODUCTIVE ORGANS: Prostate is enlarged with seeds in place.    BOWEL: No bowel obstruction. Sigmoid colonic anastomosis. Appendix is   normal.  PERITONEUM/RETROPERITONEUM: Within normal limits.  VESSELS: Atherosclerotic changes.  LYMPH NODES: No lymphadenopathy.  ABDOMINAL WALL: Skin thickening with subcutaneous fat stranding is seen   surrounding the suprapubic Murillo catheter.  BONES: Degenerative changes. Bilateral femoral fixation hardware. Diffuse   muscle wasting is noted.    IMPRESSION:  Moderate bilateral hydroureteronephrosis, mildly improved, with ureteral   wall thickening and periureteral fat stranding raising concern for   ascending urinary tract infection.    Diffuse urinary bladderwall thickening and perivesicular fat stranding   suggesting cystitis. Trace intraluminal air may be related to cystitis or   instrumentation.    Skin thickening with subcutaneous fat stranding is seen surrounding the   suprapubic Murillo catheter. Correlate clinically for cellulitis.    Cholelithiasis in a distended gallbladder. Right upper quadrant   ultrasound recommended for further evaluation, if clinically warranted.      < end of copied text >      Urinalysis + Microscopic Examination (02.05.25 @ 01:26)   pH Urine: 6.0  Urine Appearance: Turbid  Color: Yellow  Specific Gravity: 1.007  Protein, Urine: 100 mg/dL  Glucose Qualitative, Urine: Negative mg/dL  Ketone - Urine: Negative mg/dL  Blood, Urine: Moderate  Bilirubin: Negative  Urobilinogen: 0.2 mg/dL  Leukocyte Esterase Concentration: Large  Nitrite: Negative  White Blood Cell - Urine: 767 /HPF  Red Blood Cell - Urine: 28 /HPF  Bacteria: Negative /HPF  Cast: 0 /LPF  Epithelial Cells: 2 /HPF

## 2025-02-05 NOTE — CONSULT NOTE ADULT - ASSESSMENT
74 year-old man with ckd , polio , paraplegia with associated neurogenic bladder/suprapubic catheter (last exchanged 1/31)presents to the ED for pressure and pain in groin following supra pubic cath change on 1/31  cellulitis abdominal   ? cystitis    ct with b/l hydroureteronephrosis  ureteral wall thickening  diffuse bladder wall thickening  ua with pyuria --    s/p vanc/meropenem 2/5  ceftriaxone 2/5     plan  afeb, nontoxic, no leukocytosis  ua with pyuria - drawn via indwelling catheter so will have pyuria at baseline  follow cultures  check mrsa   based on previous cx- no resistant organisms isolated  will place pt on ceftriaxone-     suprapubic cath care  local care to exit site     74 year-old man with ckd , polio , paraplegia with associated neurogenic bladder/suprapubic catheter (last exchanged 1/31)presents to the ED for pressure and pain in groin following supra pubic cath change on 1/31  cellulitis abdominal   ? cystitis    ct with b/l hydroureteronephrosis  ureteral wall thickening  diffuse bladder wall thickening  ua with pyuria --    s/p vanc/meropenem 2/5  ceftriaxone 2/5     plan  afeb, nontoxic, no leukocytosis  ua with pyuria - drawn via indwelling catheter so will have pyuria at baseline  follow cultures  check mrsa   based on previous cx- no resistant organisms isolated  will place pt on ceftriaxone-     suprapubic cath care  local care to exit site    pt evaluated face to face time in addition to reviewing history,  labs, microbiology and imaging.  antibiotic stewardship, local antibiogram, infection control strategies and potential transmission issues taken in to consideration at time of  treatment decision making process.

## 2025-02-05 NOTE — PATIENT PROFILE ADULT - FUNCTIONAL ASSESSMENT - BASIC MOBILITY 6.
1-calculated by average/Not able to assess (calculate score using [facility] averaging method)

## 2025-02-05 NOTE — CONSULT NOTE ADULT - NS ATTEND AMEND GEN_ALL_CORE FT
Cellulitis around SPT and UTI  No obvious abscess  No acute  intervention  Cont iv abx, f/u cxs    Please re-consult prn, pt should follow up with regular urologist Dr Martel at

## 2025-02-05 NOTE — H&P ADULT - HISTORY OF PRESENT ILLNESS
74 year-old man with history of multiple medical issues including polio with associated neurogenic bladder/suprapubic catheter (last exchanged 1/31), iatrogenic rectal rupture requiring colostomy 2/2023, and stage 5 chronic kidney disease, recent admission [facility] 12/20-12/25/24 with ischemic ATN with associated hyperkalemia and uremia (did not receive dialysis at that time) presents to the ED for eval of worsening generalized weakness, fatigue, loss of appetite, pruritus  and pain at suprapubic mcconnell site.   Patient reports that he has not felt well since suprapubic Mcconnell was exchanged.  He has been producing urine but feels as if his bladder is not completely emptying with abdominal discomfort/pressure. He spoke to his nephrologist who recommended come to the ED.  He denies fevers, chills, chest pain, shortness of breath, nausea or vomiting.  Patient s/p recent dilation and placement of SPT 1/31/25. Draining purulent yellow urine with debris. Circumferential erythema on the skin around tube site TTP.   Ptn is well known to me from previous admission, he and his wife requested to be admitted to my service, his PCP dr Denny requested for the ptn to be admitted to my service    ED findings: Labs WBC 9.79 / HCt 31.3 / SCr 7.98 / Lactate 1.4   CT A/P with Moderate B/L hydro, mildly improved, w/ ureteral wall thickening and periureteral fat stranding c/f UTI. Diffuse urinary bladder wall thickening and perivesicular fat stranding c/f cystitis. Trace intraluminal air may be related to cystitis or instrumentation.

## 2025-02-05 NOTE — CONSULT NOTE ADULT - SUBJECTIVE AND OBJECTIVE BOX
HPI:  74y Male with PMHx of multiple medical issues including polio with associated neurogenic bladder/suprapubic catheter (last exchanged 1/31), iatrogenic rectal rupture requiring colostomy 2/2023, and stage 5 chronic kidney disease, recent admission [facility] 12/20-12/25/24 with ischemic ATN with associated hyperkalemia and uremia (did not receive dialysis at that time) presents to the ED for eval of worsening generalized weakness, fatigue, loss of appetite, pruritus  and pain at suprapubic mcconnell site.  Patient was seen at bedside, states that he had his SPT tract dilated by his urologist Dr. Alec Schrader on Friday 1/31/25. Since then, he has been experiencing pain around the SPT site as well as erythema on the skin around the site. States the skin redness around the SPT first appeared when his tract was dilated and has been red since then. Endorses good drainage from his SPT, endorses yellow, purulent urine, however, also feels suprapubic pain and the feeling like he has to urinate. Endorses a little bit of blood in the urine on the first day after the dilation however that cleared up since then. States he has been taking Cipro for the last 10-14 days. Endorses minimal purulent discharge from the tip of the penis as well. Also endorses generalized pruritis.  Denies fevers, nausea/vomiting.     PAST MEDICAL & SURGICAL HISTORY:  Polio      Hypertension      H/O urinary retention  suprapubic tube      HLD (hyperlipidemia)      BPH with obstruction/lower urinary tract symptoms      Erectile dysfunction      Bladder outlet obstruction      Presence of suprapubic catheter      Colostomy status      DVT, lower extremity      H/O cardiac murmur      S/P colostomy  2/2024      Suprapubic catheter      S/P ORIF (open reduction internal fixation) fracture  b/l legs      S/P cataract surgery  right      H/O shoulder surgery          FAMILY HISTORY:  FH: type 2 diabetes    No known  malignancy     Denies alcohol and drug abuse, nonsmoker     MEDICATIONS  (STANDING):  calcium gluconate IVPB 1 Gram(s) IV Intermittent Once  sodium chloride 0.9% Bolus 250 milliLiter(s) IV Bolus once    MEDICATIONS  (PRN):    Allergies    No Known Allergies    Intolerances      REVIEW OF SYSTEMS: Pertinent positives and negatives as stated in HPI, otherwise negative    Vital signs  T(C): 36.6 (02-04-25 @ 18:44), Max: 36.6 (02-04-25 @ 18:44)  HR: 97 (02-04-25 @ 18:44)  BP: 132/75 (02-04-25 @ 18:44)  SpO2: 99% (02-04-25 @ 18:44)  Wt(kg): --    Physical Exam  Gen: NAD  HEENT: normocephalic, atraumatic, no scleral icterus  Pulm: No respiratory distress, no subcostal retractions, no accessory muscle use   CV: S1 S2, RRR,  no JVD  Abd: Soft, NT, ND, SPT in place draining purulent yellow urine with debris, circumferential skin around SPT erythematous and tender to palpation, firm, no fluctuance palpated - flushed with NS to water clear   : Chaperoned by MD Sutton. Circumcised, no lesions.  No discharge or blood at urethral meatus.   MSK:  Moving all extremities  NEURO: A&Ox3, no focal neurological deficits, CN 2-12 grossly intact  SKIN: warm, dry    LABS:  CBC                       9.8    9.79  )-----------( 284      ( 04 Feb 2025 23:40 )             31.3     BMP               Urine Cx: p  Blood Cx:    Radiology: p HPI:  74y Male with PMHx of multiple medical issues including polio with associated neurogenic bladder/suprapubic catheter (last exchanged 1/31), iatrogenic rectal rupture requiring colostomy 2/2023, and stage 5 chronic kidney disease, recent admission [facility] 12/20-12/25/24 with ischemic ATN with associated hyperkalemia and uremia (did not receive dialysis at that time) presents to the ED for eval of worsening generalized weakness, fatigue, loss of appetite, pruritus  and pain at suprapubic mcconnell site.  Patient was seen at bedside, states that he had his SPT tract dilated by his urologist Dr. Alec Schrader on Friday 1/31/25. Since then, he has been experiencing pain around the SPT site as well as erythema on the skin around the site. States the skin redness around the SPT first appeared when his tract was dilated and has been red since then. Endorses good drainage from his SPT, endorses yellow, purulent urine, however, also feels suprapubic pain and the feeling like he has to urinate. Endorses a little bit of blood in the urine on the first day after the dilation however that cleared up since then. States he has been taking Cipro for the last 10-14 days. Endorses minimal purulent discharge from the tip of the penis as well. Also endorses generalized pruritis.  Denies fevers, nausea/vomiting.     PAST MEDICAL & SURGICAL HISTORY:  Polio      Hypertension      H/O urinary retention  suprapubic tube      HLD (hyperlipidemia)      BPH with obstruction/lower urinary tract symptoms      Erectile dysfunction      Bladder outlet obstruction      Presence of suprapubic catheter      Colostomy status      DVT, lower extremity      H/O cardiac murmur      S/P colostomy  2/2024      Suprapubic catheter      S/P ORIF (open reduction internal fixation) fracture  b/l legs      S/P cataract surgery  right      H/O shoulder surgery          FAMILY HISTORY:  FH: type 2 diabetes    No known  malignancy     Denies alcohol and drug abuse, nonsmoker     MEDICATIONS  (STANDING):  calcium gluconate IVPB 1 Gram(s) IV Intermittent Once  sodium chloride 0.9% Bolus 250 milliLiter(s) IV Bolus once    MEDICATIONS  (PRN):    Allergies    No Known Allergies    Intolerances      REVIEW OF SYSTEMS: Pertinent positives and negatives as stated in HPI, otherwise negative    Vital signs  T(C): 36.6 (02-04-25 @ 18:44), Max: 36.6 (02-04-25 @ 18:44)  HR: 97 (02-04-25 @ 18:44)  BP: 132/75 (02-04-25 @ 18:44)  SpO2: 99% (02-04-25 @ 18:44)  Wt(kg): --    Physical Exam  Gen: NAD  HEENT: normocephalic, atraumatic, no scleral icterus  Pulm: No respiratory distress, no subcostal retractions, no accessory muscle use   CV: S1 S2, RRR,  no JVD  Abd: Soft, NT, ND, SPT in place draining purulent yellow urine with debris, circumferential skin around SPT erythematous and tender to palpation, firm, no fluctuance palpated - flushed with NS to water clear   : Chaperoned by MD Sutton. Circumcised, no lesions.  No discharge or blood at urethral meatus.   MSK:  Moving all extremities  NEURO: A&Ox3, no focal neurological deficits, CN 2-12 grossly intact  SKIN: warm, dry    LABS:  CBC                       9.8    9.79  )-----------( 284      ( 04 Feb 2025 23:40 )             31.3     BMP               Urine Cx: p  Blood Cx:    Radiology:   < from: CT Abdomen and Pelvis No Cont (02.05.25 @ 02:57) >  ACC: [id number] EXAM:  CT ABDOMEN AND PELVIS   ORDERED BY: VERITO RIVAS     PROCEDURE DATE:  02/05/2025          INTERPRETATION:  CLINICAL INFORMATION: Abdominal pain.    COMPARISON: CT abdomen pelvis 2/4/2024. Renal ultrasound 2/4/2024.    CONTRAST/COMPLICATIONS:  IV Contrast: NONE  Oral Contrast: NONE    PROCEDURE:  CT of the Abdomen and Pelvis was performed.  Sagittal and coronal reformats were performed.    FINDINGS:  LOWER CHEST: Left lower lobe subsegmental atelectasis. Coronary artery   calcifications.    LIVER: A few tiny calcified granulomas.  BILE DUCTS: Normal caliber.  GALLBLADDER: Cholelithiasis in a distended gallbladder.  SPLEEN: Mild splenomegaly, measuring 13.8 cm in greatest dimension,   similar to prior study and of unknown significance.  PANCREAS: Within normal limits.  ADRENALS: Within normal limits.  KIDNEYS/URETERS: Moderate/severe bilateral hydroureteronephrosis, mildly   mildly improved since the prior study. Bilateral ureteral wall thickening   with surrounding fat stranding. Unchanged 3.8 cm right renal exophytic   lesion measuring greater than water density, which was described as a   cyst on the renal ultrasound.    BLADDER: Suprapubic catheter in place. Diffuse bladder wall thickening   and mild perivesicular fat stranding. Trace intraluminal air.  REPRODUCTIVE ORGANS: Prostate is enlarged with seeds in place.    BOWEL: No bowel obstruction. Sigmoid colonic anastomosis. Appendix is   normal.  PERITONEUM/RETROPERITONEUM: Within normal limits.  VESSELS: Atherosclerotic changes.  LYMPH NODES: No lymphadenopathy.  ABDOMINAL WALL: Skin thickening with subcutaneous fat stranding is seen   surrounding the suprapubic Mcconnell catheter.  BONES: Degenerative changes. Bilateral femoral fixation hardware. Diffuse   muscle wasting is noted.    IMPRESSION:  Moderate bilateral hydroureteronephrosis, mildly improved, with ureteral   wall thickening and periureteral fat stranding raising concern for   ascending urinary tract infection.    Diffuse urinary bladderwall thickening and perivesicular fat stranding   suggesting cystitis. Trace intraluminal air may be related to cystitis or   instrumentation.    Skin thickening with subcutaneous fat stranding is seen surrounding the   suprapubic Mcconnell catheter. Correlate clinically for cellulitis.    Cholelithiasis in a distended gallbladder. Right upper quadrant   ultrasound recommended for further evaluation, if clinically warranted.        --- End of Report ---          SHILO RIVERA MD; Resident Radiologist  Thisdocument has been electronically signed.  EFE OSCAR MD; Attending Radiologist  This document has been electronically signed. Feb 5 2025  4:57AM    < end of copied text >

## 2025-02-05 NOTE — H&P ADULT - ASSESSMENT
74 year-old man with history of multiple medical issues including polio with associated neurogenic bladder/suprapubic catheter (last exchanged 1/31), iatrogenic rectal rupture requiring colostomy 2/2023, and stage 5 chronic kidney disease, recent admission [facility] 12/20-12/25/24 with ischemic ATN with associated hyperkalemia and uremia (did not receive dialysis at that time) presents to the ED for eval of worsening generalized weakness, fatigue, loss of appetite, pruritus  and pain at suprapubic mcconnell site.   Patient reports that he has not felt well since suprapubic Mcconnell was exchanged.  He has been producing urine but feels as if his bladder is not completely emptying with abdominal discomfort/pressure. He spoke to his nephrologist who recommended come to the ED.  He denies fevers, chills, chest pain, shortness of breath, nausea or vomiting.  Patient s/p recent dilation and placement of SPT 1/31/25. Draining purulent yellow urine with debris. Circumferential erythema on the skin around tube site TTP.   Ptn is well known to me from previous admission, he and his wife requested to be admitted to my service, his PCP dr Denny requested for the ptn to be admitted to my service    ED findings: Labs WBC 9.79 / HCt 31.3 / SCr 7.98 / Lactate 1.4   CT A/P with Moderate B/L hydro, mildly improved, w/ ureteral wall thickening and periureteral fat stranding c/f UTI. Diffuse urinary bladder wall thickening and perivesicular fat stranding c/f cystitis. Trace intraluminal air may be related to cystitis or instrumentation.     ptn seen by Nephrology, Urology and ID    SONDRA  CKD5  Rash, seborrheic dermatitis  Anemia  Cystitis  Cellulitis at       plan  - No acute urologic intervention   - Patient with recent dilation and placement of SPT. as per , SPT only to be manipulated by urology.    - Empiric antibiotics as per ID  - UA and UCx to be sent from SPT,  F/U BCxs   - Irrigate SPT PRN with NS to prevent debris obstruction   - Monitor urine output   - Trospium for bladder spasms  - CT scan showing urinary infection and SQ fat stranding concerning for  cellulitis   - no immediate need for HD. ptn states wants to abstain as much as possible. ptn is euvolemic  - as per nephrology: start Bicarb drip, Calcium Acetate  - cont LOKELMA, hold Jardiance while inptn

## 2025-02-05 NOTE — PROGRESS NOTE ADULT - SUBJECTIVE AND OBJECTIVE BOX
Subjective  feels okay     Objective    Vital signs  T(F): , Max: 98.5 (02-05-25 @ 03:52)  HR: 95 (02-05-25 @ 03:52)  BP: 139/86 (02-05-25 @ 03:52)  SpO2: 99% (02-05-25 @ 03:52)  Wt(kg): --    Output       Gen: NAD  Abd: soft, nontender, nondistended. mild erythema around SPT   : SPT draining clear yellow.     Labs      02-05 @ 03:54    WBC --    / Hct --    / SCr 7.74     02-04 @ 23:40    WBC 9.79  / Hct 31.3  / SCr 7.98           Urine Cx: ?  Blood Cx: ?    Imaging    < from: CT Abdomen and Pelvis No Cont (02.05.25 @ 02:57) >  IMPRESSION:  Moderate bilateral hydroureteronephrosis, mildly improved, with ureteral   wall thickening and periureteral fat stranding raising concern for   ascending urinary tract infection.    Diffuse urinary bladderwall thickening and perivesicular fat stranding   suggesting cystitis. Trace intraluminal air may be related to cystitis or   instrumentation.    Skin thickening with subcutaneous fat stranding is seen surrounding the   suprapubic Murillo catheter. Correlate clinically for cellulitis.    Cholelithiasis in a distended gallbladder. Right upper quadrant   ultrasound recommended for further evaluation, if clinically warranted.    < end of copied text >

## 2025-02-05 NOTE — PROGRESS NOTE ADULT - SUBJECTIVE AND OBJECTIVE BOX
Overnight events noted      VITAL:  T(C): , Max: 36.9 (02-05-25 @ 03:52)  T(F): , Max: 98.5 (02-05-25 @ 03:52)  HR: 95 (02-05-25 @ 03:52)  BP: 139/86 (02-05-25 @ 03:52)  RR: 18 (02-05-25 @ 03:52)  SpO2: 99% (02-05-25 @ 03:52)      PHYSICAL EXAM:  Constitutional: NAD, Alert  HEENT: NCAT, MMM  Neck: Supple, No JVD  Respiratory: CTA-b/l  Cardiovascular: RRR s1s2, no m/r/g  Gastrointestinal: BS+, soft, NT/ND  : (+)suprapubic catheter  Extremities: no edema b/l  Neurologic: 0/5 LE strength b/l  Back: no CVAT b/l  Skin: No rashes, no nevi    LABS:                        9.8    9.79  )-----------( 284      ( 04 Feb 2025 23:40 )             31.3     Na(131)/K(5.1)/Cl(98)/HCO3(12)/BUN(100)/Cr(7.74)Glu(115)/Ca(7.7)/Mg(--)/PO4(--)    02-05 @ 03:54  Na(132)/K(5.7)/Cl(96)/HCO3(15)/BUN(99)/Cr(7.98)Glu(126)/Ca(7.9)/Mg(2.5)/PO4(8.9)    02-04 @ 23:40    Sodium, Random Urine: 132 mmol/L (02-05 @ 01:26)  Creatinine, Random Urine: 8 mg/dL (02-05 @ 01:26)  Protein/Creatinine Ratio Calculation: 15.5 Ratio (02-05 @ 01:26)  Osmolality, Random Urine: 289 mos/kg (02-05 @ 01:26)  Potassium, Random Urine: 6 mmol/L (02-05 @ 01:26)    IMAGING:  CT with Moderate B/L hydro, mildly improved, w/ ureteral wall thickening and periureteral fat stranding c/f UTI. Diffuse urinary bladderwall thickening and perivesicular fat stranding c/f cystitis. Trace intraluminal air may be related to cystitis or instrumentation. Skin thickening with subcutaneous fat stranding is seen surrounding SPT c/f cellulitis.    IMPRESSION: 74M w/ polio-paraplegia, neurogenic bladder/suprapubic catheter, and CKD5, 2/4/25 p/w exit site infection s/p suprapubic catheter change 1/31/25    (1)CKD - stage 5 - not yet on dialysis  (2)SONDRA - ATN - GFR now ~5ml/min - can we continue to get by without HD?  (3)Hyperkalemia - renally mediated - improving  (4)Metabolic acidosis - renally mediated - worsening  (5)Hyperphosphatemia - renally mediated   (6) - complicated UTI -  input appreciated    RECOMMEND:  (1)Lokelma 5g po daily   (2)NaHCO3 1300mg po tid   (3)Phoslo 1334mg po tid with meals   (4)Renal diet  (5)Meds for GFR<10  (6)BMP+Mg+PO4 at least daily while admitted  (7)ID evaluation  (8)No HD just yet    Rafita Denny MD  [facility]  Office/on call physician: [phone]  Cell (7a-7p): [phone]       (+)suprapubic pain; (+)malaise      VITAL:  T(C): , Max: 36.9 (02-05-25 @ 03:52)  T(F): , Max: 98.5 (02-05-25 @ 03:52)  HR: 95 (02-05-25 @ 03:52)  BP: 139/86 (02-05-25 @ 03:52)  RR: 18 (02-05-25 @ 03:52)  SpO2: 99% (02-05-25 @ 03:52)      PHYSICAL EXAM:  Constitutional: NAD, Alert  HEENT: NCAT, MMM  Neck: Supple, No JVD  Respiratory: CTA-b/l  Cardiovascular: RRR s1s2, no m/r/g  Gastrointestinal: BS+, soft, NT/ND  : (+)suprapubic catheter - sediment in tube  Extremities: no edema b/l  Neurologic: 0/5 LE strength b/l  Back: no CVAT b/l  Skin: No rashes, no nevi    LABS:                        9.8    9.79  )-----------( 284      ( 04 Feb 2025 23:40 )             31.3     Na(131)/K(5.1)/Cl(98)/HCO3(12)/BUN(100)/Cr(7.74)Glu(115)/Ca(7.7)/Mg(--)/PO4(--)    02-05 @ 03:54  Na(132)/K(5.7)/Cl(96)/HCO3(15)/BUN(99)/Cr(7.98)Glu(126)/Ca(7.9)/Mg(2.5)/PO4(8.9)    02-04 @ 23:40    Sodium, Random Urine: 132 mmol/L (02-05 @ 01:26)  Creatinine, Random Urine: 8 mg/dL (02-05 @ 01:26)  Protein/Creatinine Ratio Calculation: 15.5 Ratio (02-05 @ 01:26)  Osmolality, Random Urine: 289 mos/kg (02-05 @ 01:26)  Potassium, Random Urine: 6 mmol/L (02-05 @ 01:26)    IMAGING:  CT with Moderate B/L hydro, mildly improved, w/ ureteral wall thickening and periureteral fat stranding c/f UTI. Diffuse urinary bladderwall thickening and perivesicular fat stranding c/f cystitis. Trace intraluminal air may be related to cystitis or instrumentation. Skin thickening with subcutaneous fat stranding is seen surrounding SPT c/f cellulitis.    IMPRESSION: 74M w/ polio-paraplegia, neurogenic bladder/suprapubic catheter, and CKD5, 2/4/25 p/w exit site infection s/p suprapubic catheter change 1/31/25    (1)CKD - stage 5 - not yet on dialysis  (2)SONDRA - ATN - GFR now ~5ml/min - can we continue to get by without HD?  (3)Hyperkalemia - renally mediated - improving  (4)Metabolic acidosis - renally mediated - worsening  (5)Hyperphosphatemia - renally mediated   (6) - complicated UTI -  input appreciated    RECOMMEND:  (1)Lokelma 5g po daily   (2)NaHCO3 1300mg po tid   (3)Phoslo 1334mg po tid with meals   (4)Renal diet  (5)Meds for GFR<10  (6)BMP+Mg+PO4 at least daily while admitted  (7)ID evaluation  (8)No HD just yet    Rafita Denny MD  [facility]  Office/on call physician: [phone]  Cell (7a-7p): [phone]

## 2025-02-05 NOTE — H&P ADULT - NSHPPHYSICALEXAM_GEN_ALL_CORE
T(C): 36.6 (02-05-25 @ 08:04), Max: 36.9 (02-05-25 @ 03:52)  HR: 91 (02-05-25 @ 08:04) (91 - 97)  BP: 143/78 (02-05-25 @ 08:04) (132/75 - 143/78)  RR: 18 (02-05-25 @ 08:04) (18 - 18)  SpO2: 96% (02-05-25 @ 08:04) (94% - 99%)    PHYSICAL EXAM:  GENERAL: NAD, well-developed  HEAD:  Atraumatic, Normocephalic  EYES: EOMI, PERRLA, conjunctiva and sclera clear  NECK: Supple, No JVD  CHEST/LUNG: Clear to auscultation bilaterally; No wheeze  HEART: Regular rate and rhythm; No murmurs, rubs, or gallops  ABDOMEN: Soft, Nontender, Nondistended; Bowel sounds present. Skin thickening with subcutaneous fat stranding is seen surrounding SPT c/w cellulitis.  EXTREMITIES:  2+ Peripheral Pulses, No clubbing, cyanosis, or edema  PSYCH: AAOx3  NEUROLOGY: non-focal  SKIN: No rashes or lesions

## 2025-02-06 LAB
ANION GAP SERPL CALC-SCNC: 17 MMOL/L — SIGNIFICANT CHANGE UP (ref 5–17)
BUN SERPL-MCNC: 101 MG/DL — HIGH (ref 7–23)
CALCIUM SERPL-MCNC: 7.7 MG/DL — LOW (ref 8.4–10.5)
CHLORIDE SERPL-SCNC: 99 MMOL/L — SIGNIFICANT CHANGE UP (ref 96–108)
CO2 SERPL-SCNC: 15 MMOL/L — LOW (ref 22–31)
CREAT SERPL-MCNC: 8.1 MG/DL — HIGH (ref 0.5–1.3)
CULTURE RESULTS: SIGNIFICANT CHANGE UP
EGFR: 6 ML/MIN/1.73M2 — LOW
GLUCOSE SERPL-MCNC: 106 MG/DL — HIGH (ref 70–99)
HCT VFR BLD CALC: 29.3 % — LOW (ref 39–50)
HGB BLD-MCNC: 9.5 G/DL — LOW (ref 13–17)
MAGNESIUM SERPL-MCNC: 2.2 MG/DL — SIGNIFICANT CHANGE UP (ref 1.6–2.6)
MCHC RBC-ENTMCNC: 27.7 PG — SIGNIFICANT CHANGE UP (ref 27–34)
MCHC RBC-ENTMCNC: 32.4 G/DL — SIGNIFICANT CHANGE UP (ref 32–36)
MCV RBC AUTO: 85.4 FL — SIGNIFICANT CHANGE UP (ref 80–100)
MRSA PCR RESULT.: SIGNIFICANT CHANGE UP
NRBC # BLD: 0 /100 WBCS — SIGNIFICANT CHANGE UP (ref 0–0)
NRBC BLD-RTO: 0 /100 WBCS — SIGNIFICANT CHANGE UP (ref 0–0)
PHOSPHATE SERPL-MCNC: 7.8 MG/DL — HIGH (ref 2.5–4.5)
PLATELET # BLD AUTO: 264 K/UL — SIGNIFICANT CHANGE UP (ref 150–400)
POTASSIUM SERPL-MCNC: 5.1 MMOL/L — SIGNIFICANT CHANGE UP (ref 3.5–5.3)
POTASSIUM SERPL-SCNC: 5.1 MMOL/L — SIGNIFICANT CHANGE UP (ref 3.5–5.3)
RBC # BLD: 3.43 M/UL — LOW (ref 4.2–5.8)
RBC # FLD: 14.1 % — SIGNIFICANT CHANGE UP (ref 10.3–14.5)
S AUREUS DNA NOSE QL NAA+PROBE: SIGNIFICANT CHANGE UP
SODIUM SERPL-SCNC: 131 MMOL/L — LOW (ref 135–145)
SPECIMEN SOURCE: SIGNIFICANT CHANGE UP
WBC # BLD: 7.19 K/UL — SIGNIFICANT CHANGE UP (ref 3.8–10.5)
WBC # FLD AUTO: 7.19 K/UL — SIGNIFICANT CHANGE UP (ref 3.8–10.5)

## 2025-02-06 RX ORDER — SODIUM BICARBONATE 42 MG/ML
1950 INJECTION, SOLUTION INTRAVENOUS THREE TIMES A DAY
Refills: 0 | Status: DISCONTINUED | OUTPATIENT
Start: 2025-02-06 | End: 2025-02-07

## 2025-02-06 RX ORDER — ACETAMINOPHEN 160 MG/5ML
650 SUSPENSION ORAL EVERY 6 HOURS
Refills: 0 | Status: DISCONTINUED | OUTPATIENT
Start: 2025-02-06 | End: 2025-02-07

## 2025-02-06 RX ORDER — OXYCODONE HYDROCHLORIDE 30 MG/1
5 TABLET ORAL EVERY 6 HOURS
Refills: 0 | Status: DISCONTINUED | OUTPATIENT
Start: 2025-02-06 | End: 2025-02-07

## 2025-02-06 RX ORDER — OXYCODONE HYDROCHLORIDE 30 MG/1
10 TABLET ORAL EVERY 6 HOURS
Refills: 0 | Status: DISCONTINUED | OUTPATIENT
Start: 2025-02-06 | End: 2025-02-07

## 2025-02-06 RX ADMIN — SODIUM BICARBONATE 1300 MILLIGRAM(S): 42 INJECTION, SOLUTION INTRAVENOUS at 05:46

## 2025-02-06 RX ADMIN — Medication 5000 UNIT(S): at 21:12

## 2025-02-06 RX ADMIN — OXYCODONE HYDROCHLORIDE 5 MILLIGRAM(S): 30 TABLET ORAL at 21:12

## 2025-02-06 RX ADMIN — CALCIUM ACETATE 1334 MILLIGRAM(S): 667 CAPSULE ORAL at 12:46

## 2025-02-06 RX ADMIN — Medication 5000 UNIT(S): at 15:09

## 2025-02-06 RX ADMIN — SODIUM ZIRCONIUM CYCLOSILICATE 5 GRAM(S): 5 POWDER, FOR SUSPENSION ORAL at 15:08

## 2025-02-06 RX ADMIN — Medication 25 MILLIGRAM(S): at 21:13

## 2025-02-06 RX ADMIN — SODIUM BICARBONATE 1950 MILLIGRAM(S): 42 INJECTION, SOLUTION INTRAVENOUS at 15:08

## 2025-02-06 RX ADMIN — CALCIUM ACETATE 1334 MILLIGRAM(S): 667 CAPSULE ORAL at 17:46

## 2025-02-06 RX ADMIN — CEFTRIAXONE 100 MILLIGRAM(S): 250 INJECTION, POWDER, FOR SOLUTION INTRAMUSCULAR; INTRAVENOUS at 17:46

## 2025-02-06 RX ADMIN — ATORVASTATIN CALCIUM 40 MILLIGRAM(S): 80 TABLET, FILM COATED ORAL at 21:13

## 2025-02-06 RX ADMIN — Medication 5000 UNIT(S): at 05:47

## 2025-02-06 RX ADMIN — SODIUM BICARBONATE 1950 MILLIGRAM(S): 42 INJECTION, SOLUTION INTRAVENOUS at 21:12

## 2025-02-06 RX ADMIN — Medication 25 MILLIGRAM(S): at 12:47

## 2025-02-06 RX ADMIN — Medication 25 MILLIGRAM(S): at 05:51

## 2025-02-06 RX ADMIN — CALCIUM ACETATE 1334 MILLIGRAM(S): 667 CAPSULE ORAL at 09:14

## 2025-02-06 NOTE — PROVIDER CONTACT NOTE (OTHER) - REASON
Pt refusing skin assessment, turn and repositioning. Pt refusing skin assessment and assistance with turning and repositioning.

## 2025-02-06 NOTE — PROGRESS NOTE ADULT - SUBJECTIVE AND OBJECTIVE BOX
Overnight events noted      VITAL:  T(C): , Max: 36.8 (02-05-25 @ 20:50)  T(F): , Max: 98.3 (02-05-25 @ 20:50)  HR: 96 (02-06-25 @ 05:00)  BP: 139/73 (02-06-25 @ 05:00)  BP(mean): --  RR: 18 (02-06-25 @ 05:00)  SpO2: 100% (02-06-25 @ 05:00)  Wt(kg): --      PHYSICAL EXAM:  Constitutional: NAD, Alert  HEENT: NCAT, MMM  Neck: Supple, No JVD  Respiratory: CTA-b/l  Cardiovascular: RRR s1s2, no m/r/g  Gastrointestinal: BS+, soft, NT/ND  : (+)suprapubic catheter - sediment in tube  Extremities: no edema b/l  Neurologic: 0/5 LE strength b/l  Back: no CVAT b/l  Skin: No rashes, no nevi    LABS:                        9.5    7.19  )-----------( 264      ( 06 Feb 2025 07:38 )             29.3     Na(131)/K(5.1)/Cl(99)/HCO3(15)/BUN(101)/Cr(8.10)Glu(106)/Ca(7.7)/Mg(2.2)/PO4(7.8)    02-06 @ 07:38  Na(131)/K(5.1)/Cl(98)/HCO3(12)/BUN(100)/Cr(7.74)Glu(115)/Ca(7.7)/Mg(--)/PO4(8.2)    02-05 @ 03:54  Na(132)/K(5.7)/Cl(96)/HCO3(15)/BUN(99)/Cr(7.98)Glu(126)/Ca(7.9)/Mg(2.5)/PO4(8.9)    02-04 @ 23:40    Sodium, Random Urine: 132 mmol/L (02-05 @ 01:26)  Creatinine, Random Urine: 8 mg/dL (02-05 @ 01:26)  Protein/Creatinine Ratio Calculation: 15.5 Ratio (02-05 @ 01:26)  Osmolality, Random Urine: 289 mos/kg (02-05 @ 01:26)  Potassium, Random Urine: 6 mmol/L (02-05 @ 01:26)      IMPRESSION: 74M w/ polio-paraplegia, neurogenic bladder/suprapubic catheter, and CKD5, 2/4/25 p/w exit site infection s/p suprapubic catheter change 1/31/25    (1)CKD - stage 5 - not yet on dialysis  (2)SONDRA - ATN - GFR now ~5ml/min - can we continue to get by without HD? No urgent need for HD today  (3)Hyperkalemia - renally mediated - mildly high but acceptable for now  (4)Metabolic acidosis - renally mediated - worsening  (5)Hyperphosphatemia - renally mediated   (6) - complicated UTI -  and ID on board - on IV Rocephin    RECOMMEND:  (1)Increase NaHCO3 to 1950mg po tid  (2)Continue Lokelma 5g po daily   (3)Continue Phoslo 1334mg po tid with meals   (4)Dose new meds for GFR<10  (5)BMP+Mg+PO4 at least daily while admitted  (6)No HD just yet        Rafita Denny MD  [facility]  Office/on call physician: [phone]  Cell (7a-7e): [phone]       Energy improving  Suprapubic pain improving  Still with pruritus at night      VITAL:  T(C): , Max: 36.8 (02-05-25 @ 20:50)  T(F): , Max: 98.3 (02-05-25 @ 20:50)  HR: 96 (02-06-25 @ 05:00)  BP: 139/73 (02-06-25 @ 05:00)  BP(mean): --  RR: 18 (02-06-25 @ 05:00)  SpO2: 100% (02-06-25 @ 05:00)  Wt(kg): --      PHYSICAL EXAM:  Constitutional: NAD, Alert  HEENT: NCAT, MMM  Neck: Supple, No JVD  Respiratory: CTA-b/l  Cardiovascular: RRR s1s2, no m/r/g  Gastrointestinal: BS+, soft, NT/ND  : (+)suprapubic catheter - sediment in tube  Extremities: no edema b/l  Neurologic: 0/5 LE strength b/l  Back: no CVAT b/l  Skin: No rashes, no nevi    LABS:                        9.5    7.19  )-----------( 264      ( 06 Feb 2025 07:38 )             29.3     Na(131)/K(5.1)/Cl(99)/HCO3(15)/BUN(101)/Cr(8.10)Glu(106)/Ca(7.7)/Mg(2.2)/PO4(7.8)    02-06 @ 07:38  Na(131)/K(5.1)/Cl(98)/HCO3(12)/BUN(100)/Cr(7.74)Glu(115)/Ca(7.7)/Mg(--)/PO4(8.2)    02-05 @ 03:54  Na(132)/K(5.7)/Cl(96)/HCO3(15)/BUN(99)/Cr(7.98)Glu(126)/Ca(7.9)/Mg(2.5)/PO4(8.9)    02-04 @ 23:40    Sodium, Random Urine: 132 mmol/L (02-05 @ 01:26)  Creatinine, Random Urine: 8 mg/dL (02-05 @ 01:26)  Protein/Creatinine Ratio Calculation: 15.5 Ratio (02-05 @ 01:26)  Osmolality, Random Urine: 289 mos/kg (02-05 @ 01:26)  Potassium, Random Urine: 6 mmol/L (02-05 @ 01:26)      IMPRESSION: 74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 He wants to continue efforts to hold off. He does not absolutely need HD for the time being...and as of prior to his suprapubic catheter exchange last week, he had reached a state where he was doing reasonably well without HD. It is reasonable at present to comply with his wishes to see if his creatinine will trend back down to the range it was prior to the procedure (6-7mg/dL) and not initiate chronic HD yet.    (3)Hyperkalemia - renally mediated - mildly high but acceptable for now, on standing Lokelma    (4)Metabolic acidosis - renally mediated - improving but HCO3 still low, on PO NaHCO3 - we should increase the dose    (5)Hyperphosphatemia - renally mediated - slowly improving on Renal diet + Phoslo    (6) - complicated UTI -  and ID on board - on IV Rocephin    RECOMMEND:  (1)Increase NaHCO3 to 1950mg po tid  (2)Continue Lokelma 5g po daily   (3)Continue Phoslo 1334mg po tid with meals   (4)Dose new meds for GFR<10  (5)BMP+Mg+PO4 at least daily while admitted  (6)No HD just yet    Counseled patient and wife extensively regarding risks and benefits of HD initiation; answered all questions to their satisfaction      Rafita Denny MD  [facility]  Office/on call physician: [phone]  Cell (7a-7p): [phone]

## 2025-02-06 NOTE — PROVIDER CONTACT NOTE (OTHER) - ASSESSMENT
Pt AOx4, refusing incontinence care. Pt educated on importance of skin assessment turning & repositioning. Pt remains noncompliant despite education.

## 2025-02-06 NOTE — PROGRESS NOTE ADULT - SUBJECTIVE AND OBJECTIVE BOX
Subjective  reporting continued "itchiness". but states suprapubic pain is largely improved     Objective    Vital signs  T(F): , Max: 98.5 (02-05-25 @ 03:52)  HR: 95 (02-05-25 @ 03:52)  BP: 139/86 (02-05-25 @ 03:52)  SpO2: 99% (02-05-25 @ 03:52)  Wt(kg): --    Output       Gen: NAD  Abd: soft, nontender, nondistended. significant improvement in erythema around SPT. small granulation tissue noted around SPT   : SPT draining yellow urine with debris     Labs      02-05 @ 03:54    WBC --    / Hct --    / SCr 7.74     02-04 @ 23:40    WBC 9.79  / Hct 31.3  / SCr 7.98           Urine Cx: ?  Blood Cx: ?    Imaging    < from: CT Abdomen and Pelvis No Cont (02.05.25 @ 02:57) >  IMPRESSION:  Moderate bilateral hydroureteronephrosis, mildly improved, with ureteral   wall thickening and periureteral fat stranding raising concern for   ascending urinary tract infection.    Diffuse urinary bladderwall thickening and perivesicular fat stranding   suggesting cystitis. Trace intraluminal air may be related to cystitis or   instrumentation.    Skin thickening with subcutaneous fat stranding is seen surrounding the   suprapubic Murillo catheter. Correlate clinically for cellulitis.    Cholelithiasis in a distended gallbladder. Right upper quadrant   ultrasound recommended for further evaluation, if clinically warranted.    < end of copied text >

## 2025-02-06 NOTE — PROGRESS NOTE ADULT - SUBJECTIVE AND OBJECTIVE BOX
[facility]  Infectious Disease  Dr Davis, Dr Eden, Dr Bailey, HAYDEN Zheng Julio César  [phone]  after hours and weekends [phone]    Name: BRIAN DEMPSEY  Age: 74y  Gender: Male  MRN: [id number]    Interval History--  Notes reviewed  biggest complaint is itching chest back and upper legs-- ongoing for a long time  abd cath site less pain less drainage  no pelvic pain today        Allergies    No Known Allergies    Intolerances        Medications--  Antibiotics:  cefTRIAXone   IVPB 2000 milliGRAM(s) IV Intermittent every 24 hours    Immunologic:  influenza  Vaccine (HIGH DOSE) 0.5 milliLiter(s) IntraMuscular once    Other:  acetaminophen     Tablet .. PRN  atorvastatin  calcium acetate  heparin   Injectable  hydrocortisone 1% Cream PRN  hydrOXYzine hydrochloride PRN  ketoconazole 2% Shampoo  oxyCODONE    IR PRN  oxyCODONE    IR PRN  sodium bicarbonate  sodium zirconium cyclosilicate      Review of Systems--  A 10-point review of systems was obtained.     Pertinent positives and negatives--  Constitutional: No fevers. No Chills. No Rigors.   Cardiovascular: No chest pain. No palpitations.  Respiratory: No shortness of breath. No cough.  Gastrointestinal: No nausea or vomiting. No diarrhea or constipation.   Psychiatric: Pleasant. Appropriate affect.    Review of systems otherwise negative except as previously noted.    Physical Examination--  Vital Signs: T(F): 97.9 (02-06-25 @ 12:30), Max: 98.3 (02-05-25 @ 20:50)  HR: 87 (02-06-25 @ 12:30)  BP: 137/89 (02-06-25 @ 12:30)  RR: 18 (02-06-25 @ 12:30)  SpO2: 100% (02-06-25 @ 12:30)  Wt(kg): --  General: Nontoxic-appearing Male in no acute distress.  HEENT: AT/NC.   Neck: Not rigid. No sense of mass.  Nodes: None palpable.  Lungs: Clear bilaterally without rales, wheezing or rhonchi  Heart: Regular rate and rhythm  Abdomen: Bowel sounds present and normoactive. Soft. suprapubic cath site less tender, less erythema, less drainage.  Extremities: No cyanosis or clubbing. No edema.   Skin: eczema /dermatitis   Psychiatric: Appropriate affect and mood for situation.         Laboratory Studies--  CBC                        9.5    7.19  )-----------( 264      ( 06 Feb 2025 07:38 )             29.3       Chemistries  02-06    131[L]  |  99  |  101[H]  ----------------------------<  106[H]  5.1   |  15[L]  |  8.10[H]    Ca    7.7[L]      06 Feb 2025 07:38  Phos  7.8     02-06  Mg     2.2     02-06    TPro  6.6  /  Alb  3.2[L]  /  TBili  0.2  /  DBili  x   /  AST  7[L]  /  ALT  8[L]  /  AlkPhos  104  02-04      Culture Data    Culture - Blood (collected 05 Feb 2025 03:30)  Source: .Blood BLOOD  Preliminary Report (06 Feb 2025 10:02):    No growth at 24 hours    Culture - Blood (collected 05 Feb 2025 03:00)  Source: .Blood BLOOD  Preliminary Report (06 Feb 2025 10:02):    No growth at 24 hours    Culture - Urine (collected 05 Feb 2025 01:26)  Source: Clean Catch Clean Catch (Midstream)  Final Report (06 Feb 2025 07:07):    <10,000 CFU/mL Normal Urogenital Catie

## 2025-02-06 NOTE — PROGRESS NOTE ADULT - SUBJECTIVE AND OBJECTIVE BOX
Patient is a 74y old  Male who presents with a chief complaint of SONDRA, Cellulitis, Cystitis (06 Feb 2025 15:13)      SUBJECTIVE / OVERNIGHT EVENTS: ptn feels better, draining clear yellow urine. no pain at SP catheter site. ptn is still w pruritis. no immediate need for HD. blood and urine Cx from 2/5 NTD. cont CTX as per ID. atarax prn itching    MEDICATIONS  (STANDING):  atorvastatin 40 milliGRAM(s) Oral at bedtime  calcium acetate 1334 milliGRAM(s) Oral three times a day with meals  cefTRIAXone   IVPB 2000 milliGRAM(s) IV Intermittent every 24 hours  heparin   Injectable 5000 Unit(s) SubCutaneous every 8 hours  influenza  Vaccine (HIGH DOSE) 0.5 milliLiter(s) IntraMuscular once  ketoconazole 2% Shampoo 1 Application(s) Topical <User Schedule>  sodium bicarbonate 1950 milliGRAM(s) Oral three times a day  sodium zirconium cyclosilicate 5 Gram(s) Oral daily    MEDICATIONS  (PRN):  acetaminophen     Tablet .. 650 milliGRAM(s) Oral every 6 hours PRN Temp greater or equal to 38C (100.4F), Mild Pain (1 - 3)  hydrocortisone 1% Cream 1 Application(s) Topical two times a day PRN Itching  hydrOXYzine hydrochloride 25 milliGRAM(s) Oral every 6 hours PRN Itching  oxyCODONE    IR 5 milliGRAM(s) Oral every 6 hours PRN Moderate Pain (4 - 6)  oxyCODONE    IR 10 milliGRAM(s) Oral every 6 hours PRN Severe Pain (7 - 10)      Vital Signs Last 24 Hrs  T(F): 97.9 (02-06-25 @ 12:30), Max: 98.3 (02-05-25 @ 20:50)  HR: 87 (02-06-25 @ 12:30) (83 - 96)  BP: 137/89 (02-06-25 @ 12:30) (137/89 - 161/77)  RR: 18 (02-06-25 @ 12:30) (18 - 18)  SpO2: 100% (02-06-25 @ 12:30) (98% - 100%)  Telemetry:   CAPILLARY BLOOD GLUCOSE        I&O's Summary    05 Feb 2025 07:01  -  06 Feb 2025 07:00  --------------------------------------------------------  IN: 360 mL / OUT: 1300 mL / NET: -940 mL        PHYSICAL EXAM:  GENERAL: NAD, well-developed  HEAD:  Atraumatic, Normocephalic  EYES: EOMI, PERRLA, conjunctiva and sclera clear  NECK: Supple, No JVD  CHEST/LUNG: Clear to auscultation bilaterally; No wheeze  HEART: Regular rate and rhythm; No murmurs, rubs, or gallops  ABDOMEN: Soft, Nontender, Nondistended; Bowel sounds present  EXTREMITIES:  2+ Peripheral Pulses, No clubbing, cyanosis, or edema  PSYCH: AAOx3  NEUROLOGY: non-focal  SKIN: No rashes or lesions    LABS:                        9.5    7.19  )-----------( 264      ( 06 Feb 2025 07:38 )             29.3     02-06    131[L]  |  99  |  101[H]  ----------------------------<  106[H]  5.1   |  15[L]  |  8.10[H]    Ca    7.7[L]      06 Feb 2025 07:38  Phos  7.8     02-06  Mg     2.2     02-06    TPro  6.6  /  Alb  3.2[L]  /  TBili  0.2  /  DBili  x   /  AST  7[L]  /  ALT  8[L]  /  AlkPhos  104  02-04    PT/INR - ( 05 Feb 2025 03:54 )   PT: 15.9 sec;   INR: 1.40 ratio         PTT - ( 05 Feb 2025 03:54 )  PTT:30.7 sec      Urinalysis Basic - ( 06 Feb 2025 07:38 )    Color: x / Appearance: x / SG: x / pH: x  Gluc: 106 mg/dL / Ketone: x  / Bili: x / Urobili: x   Blood: x / Protein: x / Nitrite: x   Leuk Esterase: x / RBC: x / WBC x   Sq Epi: x / Non Sq Epi: x / Bacteria: x        RADIOLOGY & ADDITIONAL TESTS:    Imaging Personally Reviewed:    Consultant(s) Notes Reviewed:      Care Discussed with Consultants/Other Providers:

## 2025-02-07 ENCOUNTER — TRANSCRIPTION ENCOUNTER (OUTPATIENT)
Age: 75
End: 2025-02-07

## 2025-02-07 VITALS
SYSTOLIC BLOOD PRESSURE: 121 MMHG | HEART RATE: 98 BPM | DIASTOLIC BLOOD PRESSURE: 69 MMHG | TEMPERATURE: 98 F | RESPIRATION RATE: 18 BRPM | OXYGEN SATURATION: 98 %

## 2025-02-07 LAB
ANION GAP SERPL CALC-SCNC: 21 MMOL/L — HIGH (ref 5–17)
BUN SERPL-MCNC: 90 MG/DL — HIGH (ref 7–23)
CALCIUM SERPL-MCNC: 7.6 MG/DL — LOW (ref 8.4–10.5)
CHLORIDE SERPL-SCNC: 98 MMOL/L — SIGNIFICANT CHANGE UP (ref 96–108)
CO2 SERPL-SCNC: 16 MMOL/L — LOW (ref 22–31)
CREAT SERPL-MCNC: 7.87 MG/DL — HIGH (ref 0.5–1.3)
EGFR: 7 ML/MIN/1.73M2 — LOW
GLUCOSE BLDC GLUCOMTR-MCNC: 117 MG/DL — HIGH (ref 70–99)
GLUCOSE BLDC GLUCOMTR-MCNC: 118 MG/DL — HIGH (ref 70–99)
GLUCOSE BLDC GLUCOMTR-MCNC: 125 MG/DL — HIGH (ref 70–99)
GLUCOSE BLDC GLUCOMTR-MCNC: 90 MG/DL — SIGNIFICANT CHANGE UP (ref 70–99)
GLUCOSE SERPL-MCNC: 80 MG/DL — SIGNIFICANT CHANGE UP (ref 70–99)
HCT VFR BLD CALC: 30.5 % — LOW (ref 39–50)
HGB BLD-MCNC: 9.9 G/DL — LOW (ref 13–17)
MAGNESIUM SERPL-MCNC: 2.3 MG/DL — SIGNIFICANT CHANGE UP (ref 1.6–2.6)
MCHC RBC-ENTMCNC: 27.4 PG — SIGNIFICANT CHANGE UP (ref 27–34)
MCHC RBC-ENTMCNC: 32.5 G/DL — SIGNIFICANT CHANGE UP (ref 32–36)
MCV RBC AUTO: 84.5 FL — SIGNIFICANT CHANGE UP (ref 80–100)
NRBC # BLD: 0 /100 WBCS — SIGNIFICANT CHANGE UP (ref 0–0)
NRBC BLD-RTO: 0 /100 WBCS — SIGNIFICANT CHANGE UP (ref 0–0)
PHOSPHATE SERPL-MCNC: 7.6 MG/DL — HIGH (ref 2.5–4.5)
PLATELET # BLD AUTO: 276 K/UL — SIGNIFICANT CHANGE UP (ref 150–400)
POTASSIUM SERPL-MCNC: 5.7 MMOL/L — HIGH (ref 3.5–5.3)
POTASSIUM SERPL-SCNC: 5.7 MMOL/L — HIGH (ref 3.5–5.3)
RBC # BLD: 3.61 M/UL — LOW (ref 4.2–5.8)
RBC # FLD: 14.4 % — SIGNIFICANT CHANGE UP (ref 10.3–14.5)
SODIUM SERPL-SCNC: 135 MMOL/L — SIGNIFICANT CHANGE UP (ref 135–145)
WBC # BLD: 6.62 K/UL — SIGNIFICANT CHANGE UP (ref 3.8–10.5)
WBC # FLD AUTO: 6.62 K/UL — SIGNIFICANT CHANGE UP (ref 3.8–10.5)

## 2025-02-07 PROCEDURE — 83735 ASSAY OF MAGNESIUM: CPT

## 2025-02-07 PROCEDURE — 84133 ASSAY OF URINE POTASSIUM: CPT

## 2025-02-07 PROCEDURE — 84156 ASSAY OF PROTEIN URINE: CPT

## 2025-02-07 PROCEDURE — 85018 HEMOGLOBIN: CPT

## 2025-02-07 PROCEDURE — 96375 TX/PRO/DX INJ NEW DRUG ADDON: CPT

## 2025-02-07 PROCEDURE — 80053 COMPREHEN METABOLIC PANEL: CPT

## 2025-02-07 PROCEDURE — 83605 ASSAY OF LACTIC ACID: CPT

## 2025-02-07 PROCEDURE — 87040 BLOOD CULTURE FOR BACTERIA: CPT

## 2025-02-07 PROCEDURE — 87641 MR-STAPH DNA AMP PROBE: CPT

## 2025-02-07 PROCEDURE — 74176 CT ABD & PELVIS W/O CONTRAST: CPT | Mod: MC

## 2025-02-07 PROCEDURE — 87640 STAPH A DNA AMP PROBE: CPT

## 2025-02-07 PROCEDURE — 85027 COMPLETE CBC AUTOMATED: CPT

## 2025-02-07 PROCEDURE — 82330 ASSAY OF CALCIUM: CPT

## 2025-02-07 PROCEDURE — 84540 ASSAY OF URINE/UREA-N: CPT

## 2025-02-07 PROCEDURE — 85014 HEMATOCRIT: CPT

## 2025-02-07 PROCEDURE — 87086 URINE CULTURE/COLONY COUNT: CPT

## 2025-02-07 PROCEDURE — 99285 EMERGENCY DEPT VISIT HI MDM: CPT | Mod: 25

## 2025-02-07 PROCEDURE — 82962 GLUCOSE BLOOD TEST: CPT

## 2025-02-07 PROCEDURE — 82947 ASSAY GLUCOSE BLOOD QUANT: CPT

## 2025-02-07 PROCEDURE — 85025 COMPLETE CBC W/AUTO DIFF WBC: CPT

## 2025-02-07 PROCEDURE — 36415 COLL VENOUS BLD VENIPUNCTURE: CPT

## 2025-02-07 PROCEDURE — 83935 ASSAY OF URINE OSMOLALITY: CPT

## 2025-02-07 PROCEDURE — 84132 ASSAY OF SERUM POTASSIUM: CPT

## 2025-02-07 PROCEDURE — 84295 ASSAY OF SERUM SODIUM: CPT

## 2025-02-07 PROCEDURE — 82803 BLOOD GASES ANY COMBINATION: CPT

## 2025-02-07 PROCEDURE — 82570 ASSAY OF URINE CREATININE: CPT

## 2025-02-07 PROCEDURE — 85730 THROMBOPLASTIN TIME PARTIAL: CPT

## 2025-02-07 PROCEDURE — 96374 THER/PROPH/DIAG INJ IV PUSH: CPT

## 2025-02-07 PROCEDURE — 80074 ACUTE HEPATITIS PANEL: CPT

## 2025-02-07 PROCEDURE — 76937 US GUIDE VASCULAR ACCESS: CPT

## 2025-02-07 PROCEDURE — 83930 ASSAY OF BLOOD OSMOLALITY: CPT

## 2025-02-07 PROCEDURE — 84100 ASSAY OF PHOSPHORUS: CPT

## 2025-02-07 PROCEDURE — 81001 URINALYSIS AUTO W/SCOPE: CPT

## 2025-02-07 PROCEDURE — 82435 ASSAY OF BLOOD CHLORIDE: CPT

## 2025-02-07 PROCEDURE — 80048 BASIC METABOLIC PNL TOTAL CA: CPT

## 2025-02-07 PROCEDURE — 85610 PROTHROMBIN TIME: CPT

## 2025-02-07 PROCEDURE — 76770 US EXAM ABDO BACK WALL COMP: CPT

## 2025-02-07 PROCEDURE — 84300 ASSAY OF URINE SODIUM: CPT

## 2025-02-07 RX ORDER — CALCIUM ACETATE 667 MG/1
2001 CAPSULE ORAL
Refills: 0 | Status: DISCONTINUED | OUTPATIENT
Start: 2025-02-07 | End: 2025-02-07

## 2025-02-07 RX ORDER — EMPAGLIFLOZIN 10 MG/1
1 TABLET, FILM COATED ORAL
Refills: 0 | DISCHARGE

## 2025-02-07 RX ORDER — SODIUM ZIRCONIUM CYCLOSILICATE 5 G/5G
10 POWDER, FOR SUSPENSION ORAL DAILY
Refills: 0 | Status: DISCONTINUED | OUTPATIENT
Start: 2025-02-07 | End: 2025-02-07

## 2025-02-07 RX ORDER — SODIUM BICARBONATE 42 MG/ML
3 INJECTION, SOLUTION INTRAVENOUS
Qty: 270 | Refills: 0
Start: 2025-02-07 | End: 2025-03-08

## 2025-02-07 RX ORDER — ACETAMINOPHEN 160 MG/5ML
2 SUSPENSION ORAL
Qty: 0 | Refills: 0 | DISCHARGE
Start: 2025-02-07

## 2025-02-07 RX ORDER — DM/PSEUDOEPHED/ACETAMINOPHEN 10-30-250
50 CAPSULE ORAL ONCE
Refills: 0 | Status: COMPLETED | OUTPATIENT
Start: 2025-02-07 | End: 2025-02-07

## 2025-02-07 RX ORDER — CALCIUM ACETATE 667 MG/1
3 CAPSULE ORAL
Qty: 270 | Refills: 0
Start: 2025-02-07 | End: 2025-03-08

## 2025-02-07 RX ORDER — CALCIUM ACETATE 667 MG/1
1 CAPSULE ORAL
Refills: 0 | DISCHARGE

## 2025-02-07 RX ORDER — SODIUM ZIRCONIUM CYCLOSILICATE 5 G/5G
2 POWDER, FOR SUSPENSION ORAL
Qty: 60 | Refills: 0
Start: 2025-02-07 | End: 2025-03-08

## 2025-02-07 RX ORDER — SODIUM ZIRCONIUM CYCLOSILICATE 5 G/5G
1 POWDER, FOR SUSPENSION ORAL
Refills: 0 | DISCHARGE

## 2025-02-07 RX ORDER — CEFUROXIME AXETIL 500 MG
1 TABLET ORAL
Qty: 5 | Refills: 0
Start: 2025-02-07 | End: 2025-02-11

## 2025-02-07 RX ADMIN — SODIUM ZIRCONIUM CYCLOSILICATE 10 GRAM(S): 5 POWDER, FOR SUSPENSION ORAL at 12:12

## 2025-02-07 RX ADMIN — SODIUM BICARBONATE 1950 MILLIGRAM(S): 42 INJECTION, SOLUTION INTRAVENOUS at 14:33

## 2025-02-07 RX ADMIN — CALCIUM ACETATE 2001 MILLIGRAM(S): 667 CAPSULE ORAL at 08:36

## 2025-02-07 RX ADMIN — Medication 5000 UNIT(S): at 05:15

## 2025-02-07 RX ADMIN — CALCIUM ACETATE 2001 MILLIGRAM(S): 667 CAPSULE ORAL at 14:33

## 2025-02-07 RX ADMIN — Medication 5000 UNIT(S): at 14:33

## 2025-02-07 RX ADMIN — Medication 50 MILLILITER(S): at 08:36

## 2025-02-07 RX ADMIN — Medication 7 UNIT(S): at 08:36

## 2025-02-07 RX ADMIN — SODIUM BICARBONATE 1950 MILLIGRAM(S): 42 INJECTION, SOLUTION INTRAVENOUS at 05:16

## 2025-02-07 NOTE — DISCHARGE NOTE PROVIDER - CARE PROVIDER_API CALL
Rafita Denny  Nephrology  [address], [address]  [place] [address]  Phone: [phone]  Fax: [phone]  Established Patient  Follow Up Time: 1 week

## 2025-02-07 NOTE — PROGRESS NOTE ADULT - SUBJECTIVE AND OBJECTIVE BOX
[facility]  Infectious Disease  Dr Davis, Dr Eden, Dr Bailey, HAYDEN Zheng Julio César  [phone]  after hours and weekends [phone]    Name: BRIAN DEMPSEY  Age: 74y  Gender: Male  MRN: [id number]    Interval History--  Notes reviewed  no complaints  feels well   going home    Allergies    No Known Allergies    Intolerances        Medications--  Antibiotics:  cefTRIAXone   IVPB 2000 milliGRAM(s) IV Intermittent every 24 hours    Immunologic:  influenza  Vaccine (HIGH DOSE) 0.5 milliLiter(s) IntraMuscular once    Other:  acetaminophen     Tablet .. PRN  atorvastatin  calcium acetate  heparin   Injectable  hydrocortisone 1% Cream PRN  hydrOXYzine hydrochloride PRN  ketoconazole 2% Shampoo  oxyCODONE    IR PRN  oxyCODONE    IR PRN  sodium bicarbonate  sodium zirconium cyclosilicate      Review of Systems--  A 10-point review of systems was obtained.     Pertinent positives and negatives--  Constitutional: No fevers. No Chills. No Rigors.   Cardiovascular: No chest pain. No palpitations.  Respiratory: No shortness of breath. No cough.  Gastrointestinal: No nausea or vomiting. No diarrhea or constipation.   Psychiatric: Pleasant. Appropriate affect.    Review of systems otherwise negative except as previously noted.    Physical Examination--  Vital Signs: T(F): 98.3 (02-07-25 @ 12:43), Max: 98.5 (02-06-25 @ 21:08)  HR: 90 (02-07-25 @ 12:43)  BP: 123/76 (02-07-25 @ 12:43)  RR: 18 (02-07-25 @ 12:43)  SpO2: 97% (02-07-25 @ 12:43)  Wt(kg): --  General: Nontoxic-appearing Male in no acute distress.  HEENT: AT/NC.   Neck: Not rigid. No sense of mass.  Nodes: None palpable.  Lungs: Clear bilaterally without rales, wheezing or rhonchi  Heart: Regular rate and rhythm. No Murmur. No rub. No gallop. No palpable thrill.  Abdomen: Bowel sounds present and normoactive. Soft. Nondistended. suprapubic cath exit improving erythema  no discharge   Extremities: No cyanosis or clubbing. No edema.   Skin: Warm. Dry. Good turgor. No rash. No vasculitic stigmata.  Psychiatric: Appropriate affect and mood for situation.         Laboratory Studies--  CBC                        9.9    6.62  )-----------( 276      ( 07 Feb 2025 07:08 )             30.5       Chemistries  02-07    135  |  98  |  90[H]  ----------------------------<  80  5.7[H]   |  16[L]  |  7.87[H]    Ca    7.6[L]      07 Feb 2025 07:08  Phos  7.6     02-07  Mg     2.3     02-07        Culture Data    Culture - Blood (collected 05 Feb 2025 03:30)  Source: .Blood BLOOD  Preliminary Report (07 Feb 2025 10:01):    No growth at 48 Hours    Culture - Blood (collected 05 Feb 2025 03:00)  Source: .Blood BLOOD  Preliminary Report (07 Feb 2025 10:01):    No growth at 48 Hours    Culture - Urine (collected 05 Feb 2025 01:26)  Source: Clean Catch Clean Catch (Midstream)  Final Report (06 Feb 2025 07:07):    <10,000 CFU/mL Normal Urogenital Catie

## 2025-02-07 NOTE — PROGRESS NOTE ADULT - SUBJECTIVE AND OBJECTIVE BOX
Doing okay today, Cr and azotemia slightly better today. K back up.     VITAL:  T(C): , Max: 36.8 (02-05-25 @ 20:50)  T(F): , Max: 98.3 (02-05-25 @ 20:50)  HR: 96 (02-06-25 @ 05:00)  BP: 139/73 (02-06-25 @ 05:00)  BP(mean): --  RR: 18 (02-06-25 @ 05:00)  SpO2: 100% (02-06-25 @ 05:00)  Wt(kg): --      PHYSICAL EXAM:  Constitutional: NAD, Alert  HEENT: NCAT, MMM  Neck: Supple, No JVD  Respiratory: CTA-b/l  Cardiovascular: RRR s1s2, no m/r/g  Gastrointestinal: BS+, soft, NT/ND  : (+)suprapubic catheter - sediment in tube  Extremities: no edema b/l  Neurologic: 0/5 LE strength b/l  Back: no CVAT b/l  Skin: No rashes, no nevi    LABS:                        9.5    7.19  )-----------( 264      ( 06 Feb 2025 07:38 )             29.3     Na(131)/K(5.1)/Cl(99)/HCO3(15)/BUN(101)/Cr(8.10)Glu(106)/Ca(7.7)/Mg(2.2)/PO4(7.8)    02-06 @ 07:38  Na(131)/K(5.1)/Cl(98)/HCO3(12)/BUN(100)/Cr(7.74)Glu(115)/Ca(7.7)/Mg(--)/PO4(8.2)    02-05 @ 03:54  Na(132)/K(5.7)/Cl(96)/HCO3(15)/BUN(99)/Cr(7.98)Glu(126)/Ca(7.9)/Mg(2.5)/PO4(8.9)    02-04 @ 23:40    Sodium, Random Urine: 132 mmol/L (02-05 @ 01:26)  Creatinine, Random Urine: 8 mg/dL (02-05 @ 01:26)  Protein/Creatinine Ratio Calculation: 15.5 Ratio (02-05 @ 01:26)  Osmolality, Random Urine: 289 mos/kg (02-05 @ 01:26)  Potassium, Random Urine: 6 mmol/L (02-05 @ 01:26)      IMPRESSION: 74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 He wants to continue efforts to hold off. He does not absolutely need HD for the time being...and as of prior to his suprapubic catheter exchange last week, he had reached a state where he was doing reasonably well without HD. It is reasonable at present to comply with his wishes to see if his creatinine will trend back down to the range it was prior to the procedure (6-7mg/dL) and not initiate chronic HD yet.> Cr and azotemia mildly improved today 2/7/25    (3)Hyperkalemia - renally mediated - high today, insulin and D50 added. lokelma up to 10g qd     (4)Metabolic acidosis - renally mediated - improving but HCO3 still low, on PO NaHCO3, slowly improving    (5)Hyperphosphatemia - renally mediated - increase phoslo today    (6) - complicated UTI -  and ID on board - on IV Rocephin    RECOMMEND:  (1)c/w NaHCO3 to 1950mg po tid  (2)increase lokelma to 10g qd, give 7u insulin + D50 today  (3)inc Phoslo to 2001mg po tid with meals   (4)Dose new meds for GFR<10  (5)BMP+Mg+PO4 at least daily while admitted  (6)No HD just yet    Counseled patient and wife extensively regarding risks and benefits of HD initiation; answered all questions to their satisfaction      Fletcher Whitehead DO   [facility]  Office/on call physician: [phone]

## 2025-02-07 NOTE — DISCHARGE NOTE NURSING/CASE MANAGEMENT/SOCIAL WORK - NSDCPEFALRISK_GEN_ALL_CORE
For information on Fall & Injury Prevention, visit: [email or web address] OR  [email or web address] OR  https://www.cdc.gov/steadi/patient.html

## 2025-02-07 NOTE — DISCHARGE NOTE NURSING/CASE MANAGEMENT/SOCIAL WORK - NSDCFUADDAPPT_GEN_ALL_CORE_FT
APPTS ARE READY TO BE MADE: [X] YES    Best Family or Patient Contact (if needed):    Additional Information about above appointments (if needed):    1: PCP/nephrologist Dr. Denny  2: Outpatient [facility] urologist 1-2 weeks after discharge  3:     Other comments or requests:

## 2025-02-07 NOTE — DISCHARGE NOTE NURSING/CASE MANAGEMENT/SOCIAL WORK - FINANCIAL ASSISTANCE
[facility] provides services at a reduced cost to those who are determined to be eligible through [facility]’s financial assistance program. Information regarding [facility]’s financial assistance program can be found by going to [email or web address] or by calling [phone].

## 2025-02-07 NOTE — PROGRESS NOTE ADULT - SUBJECTIVE AND OBJECTIVE BOX
Patient is a 74y old  Male who presents with a chief complaint of SONDRA, Cellulitis, Cystitis (07 Feb 2025 15:01)      SUBJECTIVE / OVERNIGHT EVENTS: no new events, urine cx NTD, cleared by renal and by ID for DC home w close outptn f/u. raise Lokelma to 10 gms daily. po ceftin 250 mg daily through 2/12    MEDICATIONS  (STANDING):  atorvastatin 40 milliGRAM(s) Oral at bedtime  calcium acetate 2001 milliGRAM(s) Oral three times a day with meals  cefTRIAXone   IVPB 2000 milliGRAM(s) IV Intermittent every 24 hours  heparin   Injectable 5000 Unit(s) SubCutaneous every 8 hours  influenza  Vaccine (HIGH DOSE) 0.5 milliLiter(s) IntraMuscular once  ketoconazole 2% Shampoo 1 Application(s) Topical <User Schedule>  sodium bicarbonate 1950 milliGRAM(s) Oral three times a day  sodium zirconium cyclosilicate 10 Gram(s) Oral daily    MEDICATIONS  (PRN):  acetaminophen     Tablet .. 650 milliGRAM(s) Oral every 6 hours PRN Temp greater or equal to 38C (100.4F), Mild Pain (1 - 3)  hydrocortisone 1% Cream 1 Application(s) Topical two times a day PRN Itching  hydrOXYzine hydrochloride 25 milliGRAM(s) Oral every 6 hours PRN Itching  oxyCODONE    IR 5 milliGRAM(s) Oral every 6 hours PRN Moderate Pain (4 - 6)  oxyCODONE    IR 10 milliGRAM(s) Oral every 6 hours PRN Severe Pain (7 - 10)      Vital Signs Last 24 Hrs  T(F): 98.3 (02-07-25 @ 12:43), Max: 98.5 (02-06-25 @ 21:08)  HR: 90 (02-07-25 @ 12:43) (90 - 99)  BP: 123/76 (02-07-25 @ 12:43) (123/76 - 159/79)  RR: 18 (02-07-25 @ 12:43) (18 - 18)  SpO2: 97% (02-07-25 @ 12:43) (97% - 99%)  Telemetry:   CAPILLARY BLOOD GLUCOSE      POCT Blood Glucose.: 125 mg/dL (07 Feb 2025 12:39)  POCT Blood Glucose.: 118 mg/dL (07 Feb 2025 10:41)  POCT Blood Glucose.: 117 mg/dL (07 Feb 2025 09:27)  POCT Blood Glucose.: 90 mg/dL (07 Feb 2025 08:35)    I&O's Summary    06 Feb 2025 07:01  -  07 Feb 2025 07:00  --------------------------------------------------------  IN: 240 mL / OUT: 1100 mL / NET: -860 mL    07 Feb 2025 07:01  -  07 Feb 2025 15:25  --------------------------------------------------------  IN: 480 mL / OUT: 1000 mL / NET: -520 mL        PHYSICAL EXAM:  GENERAL: NAD, well-developed  HEAD:  Atraumatic, Normocephalic  EYES: EOMI, PERRLA, conjunctiva and sclera clear  NECK: Supple, No JVD  CHEST/LUNG: Clear to auscultation bilaterally; No wheeze  HEART: Regular rate and rhythm; No murmurs, rubs, or gallops  ABDOMEN: Soft, Nontender, Nondistended; Bowel sounds present  EXTREMITIES:  2+ Peripheral Pulses, No clubbing, cyanosis, or edema  PSYCH: AAOx3  NEUROLOGY: non-focal  SKIN: No rashes or lesions    LABS:                        9.9    6.62  )-----------( 276      ( 07 Feb 2025 07:08 )             30.5     02-07    135  |  98  |  90[H]  ----------------------------<  80  5.7[H]   |  16[L]  |  7.87[H]    Ca    7.6[L]      07 Feb 2025 07:08  Phos  7.6     02-07  Mg     2.3     02-07            Urinalysis Basic - ( 07 Feb 2025 07:08 )    Color: x / Appearance: x / SG: x / pH: x  Gluc: 80 mg/dL / Ketone: x  / Bili: x / Urobili: x   Blood: x / Protein: x / Nitrite: x   Leuk Esterase: x / RBC: x / WBC x   Sq Epi: x / Non Sq Epi: x / Bacteria: x        RADIOLOGY & ADDITIONAL TESTS:    Imaging Personally Reviewed:    Consultant(s) Notes Reviewed:      Care Discussed with Consultants/Other Providers:

## 2025-02-07 NOTE — DISCHARGE NOTE NURSING/CASE MANAGEMENT/SOCIAL WORK - NSSCCONTNUM_GEN_ALL_CORE
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     Majority of history obtained from notes as patient was eating a warm meal (first of the day) during evaluation and did not want to talk much.     Per primary note, EGD completed 7/8 revealed no evidence of active bleeding. Had tagged RBC scan and CTA which noted small bleed in small bowel. IR attempted embolization but could not find the source. Has required multiple pRBC as well as Vit K and KCentra without improvement. Briefly needed pressor support to maintain BP. Last FFP 7/13 and platelets 7/14. GI is planning for capsule endoocopy with possible balloon enteroscopy.    Patient did confirm that he had no history of prolonged bleeding. He notes bruising easily since starting dialysis but never throughout childhood. Denies any history of deep bruising or bloody emesis. Patient preferred to defer further questioning and physical exam. Agreed to discuss plan tomorrow.    Hematologic History:    04/20/2023- Previous admission for GI bleeding, workup showed in setting of elevated PT and PTT mixing studies correct with PTT but not PTT, factor VII was normal  Was found to have acquired coagulopathy of uncertain etiology, he was discharged on vitamin K supplements but was unable to obtain medication  -PT/INR elevated since 06/22    Review of Systems: Pertinent positive and negative systems described in HPI;     Past Medical History:  Past Medical  History:   Diagnosis Date     Anemia 7/16/2023     Erosive esophagitis 7/16/2023    EGD, Dr. Sorto, [facility] - duodenitis, gastritis, esophagitis (4/7/23)     ESRD (end stage renal disease) on dialysis (H) 7/16/2023     GI bleed 7/16/2023     Malignant neoplasm of prostate (H) 7/16/2023     Other hydronephrosis 7/16/2023    Secondary to metastatic prostate carcinoma      Pericardial effusion 7/16/2023    Noted on echocardiogram 6/26/2023     Portal hypertension (H) 7/16/2023     SVT (supraventricular tachycardia) (H) 8/18/2022    Underwent cardioversion.        Past Surgical History:  - prostate biopsy  - colonoscopy  - EGD  - AV fistula      Social History:  Former smoked, rare alcohol use       Family History  No family history of blood disorders- confirmed with patient    CurrenT Medications  Current Facility-Administered Medications   Medication     acetaminophen (TYLENOL) tablet 650 mg     cefTRIAXone (ROCEPHIN) 2 g vial to attach to  ml bag for ADULTS or NS 50 ml bag for PEDS     lidocaine (LMX4) cream     lidocaine 1 % 0.1-1 mL     lidocaine-prilocaine (EMLA) cream     midodrine (PROAMATINE) tablet 10 mg     pantoprazole (PROTONIX) IV push injection 40 mg     [START ON 7/18/2023] predniSONE (DELTASONE) tablet 5 mg     simethicone (MYLICON) suspension     sodium chloride (PF) 0.9% PF flush 3 mL     sodium chloride (PF) 0.9% PF flush 3 mL     Home Medications:  No current facility-administered medications on file prior to encounter.  abiraterone (ZYTIGA) 250 MG tablet, Take 1,000 mg by mouth At Bedtime  B Complex-C-Folic Acid (RENAL) 1 MG CAPS, Take 1 capsule by mouth daily  calcium acetate (PHOSLO) 667 MG CAPS capsule, Take 1,334 mg by mouth 3 times daily (with meals)  leuprolide (LUPRON DEPOT, 3-MONTH,) 22.5 MG kit, Inject 22.5 mg into the muscle every 3 months  pantoprazole (PROTONIX) 40 MG EC tablet, Take 40 mg by mouth 2 times daily (before meals)  predniSONE (DELTASONE) 5 MG tablet, Take 1 tablet (5 mg  "total) by mouth nightly. While on abiraterone  propranolol (INDERAL) 10 MG tablet, Take 10 mg by mouth 3 times daily  Vitamin K 100 MCG TABS, Take 100 mcg by mouth daily         Physical Exam:    BP (!) 89/62 (BP Location: Right arm)   Pulse 102   Temp 98.7  F (37.1  C) (Oral)   Resp 16   Ht 1.702 m (5' 7\")   Wt 98 kg (216 lb 0.8 oz)   SpO2 100%   BMI 33.84 kg/m    Gen: Well appearing, in NAD  HEENT: EOMI  Pulm: Normal work of breathing  Skin: No rash, cyanosis, +large ecchymosis on right wrist  Neuro: Alert and answering questions appropriately. CNII-VIII grossly intact. Moving all extremities without issue.  Labs & Studies: I personally reviewed the following studies:  ROUTINE LABS (Last four results):  CMP  Recent Labs   Lab 07/17/23  0731 07/17/23  0730 07/16/23  1802   NA  --  131* 132*   POTASSIUM  --  4.0 4.0   CHLORIDE  --  95* 97*   CO2  --  24 25   ANIONGAP  --  12 10   * 115* 106*   BUN  --  115.0* 99.7*   CR  --  3.93* 3.62*   GFRESTIMATED  --  17* 19*   LATOYA  --  7.3* 7.0*   MAG  --  1.6* 1.4*   PHOS  --  2.5 2.0*   PROTTOTAL  --  3.4* 3.2*   ALBUMIN  --  1.9* 1.8*   BILITOTAL  --  0.2 0.3   ALKPHOS  --  105 67   AST  --  42 32   ALT  --  31 23     CBC  Recent Labs   Lab 07/17/23  1308 07/17/23  0730 07/17/23  0013 07/16/23  1802   WBC  --   --   --  6.9   RBC  --   --   --  2.27*   HGB 6.5* 7.2* 6.9* 6.6*   HCT  --   --   --  20.0*   MCV  --   --   --  88   MCH  --   --   --  29.1   MCHC  --   --   --  33.0   RDW  --   --   --  17.0*   PLT  --   --   --  120*     INR  Recent Labs   Lab 07/17/23  0730 07/16/23  1802   INR 1.90* 2.03*     MIXING STUDIES  [facility]    Component 04/20/2023       PROTIME MIXING STUDY 21.3 High       PTT MIX STUDY 37.1 High       PROTIME 1:1 MIX 13.7   PTT 1:1 MIX 31.1     COAG FACTOR ACTIVITY  [facility]  04/20/2023  Component 04/20/2023       FACTOR      COAG OTHER  [facility]" [phone] System    Component 04/20/2023       THROMBIN CLOTTING TIME 19     COAG FACTOR ACTIVITY  [facility]  04/20/2023  Component      FACTOR VII     Component 04/20/2023       FACTOR                   Component 04/20/2023       THROMBIN CLOTTING TIME 19       Assessment & Plan:   Marv Carlin is a 56 year old male hx of prostate cancer, ESRD presumed 2/2 to prostate cancer 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    #Prolonged INR- potential coagulopathy  Suspected acquired inhibitor in extrinsic pathway   Vitamin K deficency also likely in the setting of malnutrition, chronic blood loss, and possible liver disease   -Previous workup (4/20/23) showed in setting of elevated PT and PTT mixing studies correct with PTT but not PT, factor VII wnl   -Baseline INR ~1.7- 2.0 now 2.03 (07/16)    Given multiple recent transfusions as well as administration of K-centra we will work to evaluate for presence of coagulopathy.     Recommendations:   - Mixing study  - PT, INR, PTT  - Factor 2,5,7,8,10  - Thrombin time  - APS serology  - TEG  -- all orders have been placed for primary team and will be drawn in the morning.     Patient was seen and plan of care was discussed with attending physician Dr. Rubin    We will continue to following this patient. Please don't hesitate to contact the Fellow On-Call with questions.    Prema Vance, M4  Hematology          Physician Attestation   I, Sowmya Rubin MD/PhD, was present with the medical/MATIAS student who participated in the service and in the documentation of the note.  I have verified the history and personally performed the physical exam and medical decision making.  I agree with the assessment and plan of care as documented in the note.      Key findings: 56 year old  gentleman with no prior history of coagulopathy with OSH laboratory studies and bleeding with potential for acquired coagulopathy.    Please see A&P for additional details of medical decision making.    I have personally reviewed the following data over the past 24 hrs:    N/A  \   6.5 (LL)   / N/A     131 (L) 95 (L) 115.0 (H) /  128 (H)   4.0 24 3.93 (H) \       ALT: 31 AST: 42 AP: 105 TBILI: 0.2   ALB: 1.9 (L) TOT PROTEIN: 3.4 (L) LIPASE: N/A       Procal: N/A CRP: N/A Lactic Acid: N/A       INR:  1.90 (H) PTT:  34   D-dimer:  N/A Fibrinogen:  241         Sowmya Rubin MD/PhD  Date of Service (when I saw the patient): 07/17/23

## 2025-02-07 NOTE — DISCHARGE NOTE PROVIDER - NSDCCPCAREPLAN_GEN_ALL_CORE_FT
PRINCIPAL DISCHARGE DIAGNOSIS  Diagnosis: Acute UTI  Assessment and Plan of Treatment: Treated with IV antibiotics while in the hospital (Ceftriaxone). You are being discharged with oral antibiotics to complete your treatement course. Take oral Ceftin once per day, first dose tomorrow 02/08/25, last dose on 0213/25. Follow up with your PCP/nephrologist Dr. Denny  1 week after discharge and follow up with your urologist 1-2 weeks after discharge  You had a bladder and/or kidney infection.  If you are discharged on antibiotics, you will need to finish the medication as prescribed to reduced the likelihood that the infection will recur.  Avoid medications that will cause urinary retention such as benadryl whenever possible.  Drink adequate fluids - there is no harm in drinking cranberry juice.  Females should urinate right after sex.  Contact your doctor if you experience new symptoms or continued symptoms after treatment, such as pain or burning with urination, frequent urination, urinary urgency, blood in the urine, fever, back pain, and/or nausea vomiting.      SECONDARY DISCHARGE DIAGNOSES  Diagnosis: Neurogenic bladder  Assessment and Plan of Treatment: SPT care performed by urology in the emergency department. Irrigate SPT with normal saline as needed for sediment/clogging. Follow up with your Urologist 1-2 weeks after discharge for further management    Diagnosis: Stage 5 chronic kidney disease not on chronic dialysis  Assessment and Plan of Treatment: Sodium bicarbonate and Calcium acetate doses increased this admission. Lokelma dose increased for persistent hyperkalemia. Continue as prescribed and follow up with your PCP/Nephrologist Dr. Denny 1 week after discharge  Avoid taking (NSAIDs) - (ex: Ibuprofen, Advil, Celebrex, Naprosyn)  Avoid taking any nephrotoxic agents (can harm kidneys) - Intravenous contrast for diagnostic testing, combination cold medications.  Have all medications adjusted for your renal function by your Health Care Provider.  Blood pressure control is important.  Take all medication as prescribed.

## 2025-02-07 NOTE — PROGRESS NOTE ADULT - PROVIDER SPECIALTY LIST ADULT
Infectious Disease
Infectious Disease
Nephrology
Internal Medicine
Internal Medicine
Urology
Urology

## 2025-02-07 NOTE — DISCHARGE NOTE PROVIDER - HOSPITAL COURSE
HPI:  74 year-old man with history of multiple medical issues including polio with associated neurogenic bladder/suprapubic catheter (last exchanged 1/31), iatrogenic rectal rupture requiring colostomy 2/2023, and stage 5 chronic kidney disease, recent admission [facility] 12/20-12/25/24 with ischemic ATN with associated hyperkalemia and uremia (did not receive dialysis at that time) presents to the ED for eval of worsening generalized weakness, fatigue, loss of appetite, pruritus  and pain at suprapubic mcconnell site.   Patient reports that he has not felt well since suprapubic Mcconnell was exchanged.  He has been producing urine but feels as if his bladder is not completely emptying with abdominal discomfort/pressure. He spoke to his nephrologist who recommended come to the ED.  He denies fevers, chills, chest pain, shortness of breath, nausea or vomiting.  Patient s/p recent dilation and placement of SPT 1/31/25. Draining purulent yellow urine with debris. Circumferential erythema on the skin around tube site TTP.   Ptn is well known to me from previous admission, he and his wife requested to be admitted to my service, his PCP dr Denny requested for the ptn to be admitted to my service    ED findings: Labs WBC 9.79 / HCt 31.3 / SCr 7.98 / Lactate 1.4   CT A/P with Moderate B/L hydro, mildly improved, w/ ureteral wall thickening and periureteral fat stranding c/f UTI. Diffuse urinary bladder wall thickening and perivesicular fat stranding c/f cystitis. Trace intraluminal air may be related to cystitis or instrumentation.  (05 Feb 2025 09:09)      Hospital Course:  Urology was consulted, SPT maintenance performed in ED. ID was consulted, patient treated with IV ceftriaxone while admitted, will transition to oral ceftin upon discharge to complete a 7 day course. Nephrology was consulted, Sodium bicarbonate and calcium acetate doses increased. 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   Patient reports that he has not felt well since suprapubic Mcconnell was exchanged.  He has been producing urine but feels as if his bladder is not completely emptying with abdominal discomfort/pressure. He spoke to his nephrologist who recommended come to the ED.  He denies fevers, chills, chest pain, shortness of breath, nausea or vomiting.  Patient s/p recent dilation and placement of SPT 1/31/25. Draining purulent yellow urine with debris. Circumferential erythema on the skin around tube site TTP.   Ptn is well known to me from previous admission, he and his wife requested to be admitted to my service, his PCP dr Denny requested for the ptn to be admitted to my service    ED findings: Labs WBC 9.79 / HCt 31.3 / SCr 7.98 / Lactate 1.4   CT A/P with Moderate B/L hydro, mildly improved, w/ ureteral wall thickening and periureteral fat stranding c/f UTI. Diffuse urinary bladder wall thickening and perivesicular fat stranding c/f cystitis. Trace intraluminal air may be related to cystitis or instrumentation.  (05 Feb 2025 09:09)      Hospital Course:  Urology was consulted, SPT maintenance performed in ED. ID was consulted, patient treated with IV ceftriaxone while admitted, will transition to oral ceftin upon discharge to complete a 7 day course. Nephrology was consulted, Sodium bicarbonate and calcium acetate doses increased. Persistently hyperkalemic despite standing Lokelma 5G once daily, increased to 10G daily per nephrology.     Important/Active Issues for Follow-Up:  SPT- Urology, PCP  UTI- PCP    Medication Changes and Reason:  Ceftin for UTI. Sodium bicarbonate dose and calcium acetate dose increased for CKD5  Loklema increased for persistent hyperkalemia     Advance Directives  [x] Full code [ ] Hospice [ ] DNR    Discharge Diagnoses:  UTI  Neurogenic bladder

## 2025-02-07 NOTE — PROGRESS NOTE ADULT - ASSESSMENT
74 year-old man with ckd , polio , paraplegia with associated neurogenic bladder/suprapubic catheter (last exchanged 1/31)presents to the ED for pressure and pain in groin following supra pubic cath change on 1/31  cellulitis abdominal   ? cystitis    ct with b/l hydroureteronephrosis  ureteral wall thickening  diffuse bladder wall thickening  ua with pyuria --    s/p vanc/meropenem 2/5  ceftriaxone 2/5     plan  afeb, nontoxic, no leukocytosis  cellulitis improving     ua with pyuria - drawn via indwelling catheter so will have pyuria at baseline  urine cx neg  blood cx neg to date    cont  ceftriaxone day 3  switch po ceftin 250 daily til 2/12    suprapubic cath care  local care to exit site  hydrocortisone for eczematous rash  dc planning     pt evaluated face to face time in addition to reviewing history,  labs, microbiology and imaging.  antibiotic stewardship, local antibiogram, infection control strategies and potential transmission issues taken in to consideration at time of  treatment decision making process.
74y Male with PMHx of multiple medical issues including polio with associated neurogenic bladder/suprapubic catheter (last exchanged 1/31), iatrogenic rectal rupture requiring colostomy 2/2023, and stage 5 chronic kidney disease, recent admission [facility] 12/20-12/25/24 with ischemic ATN with associated hyperkalemia and uremia (did not receive dialysis at that time) presents to the ED for eval of worsening generalized weakness, fatigue, loss of appetite, pruritus  and pain at suprapubic mcconnell site.    Urology consulted due to recent SPT tract dilation.    CT with Moderate B/L hydro, mildly improved, w/ ureteral wall thickening and periureteral fat stranding c/f UTI. Diffuse urinary bladderwall thickening and perivesicular fat stranding c/f cystitis. Trace intraluminal air may be related to cystitis or instrumentation. Skin thickening with subcutaneous fat stranding is seen surrounding SPT c/f cellulitis.      Recs  - No acute urologic intervention   - continue SPT --> DO NOT REMOVE SPT without paging UROLOGY FIRST, recently dilated tract    - abx per ID   - f/u cultures    - Irrigate SPT PRN with NS to prevent debris obstruction, can consider flushing 1x daily due to debris in tubing   - Monitor urine output   - monitor SPT site erythema-> improving today   - No further inpatient urologic intervention, patient to follow-up with his urologist after discharge    [facility]  [address] [place] [address], [address]  [place] [address] [phone]   
74 year-old man with history of multiple medical issues including polio with associated neurogenic bladder/suprapubic catheter (last exchanged 1/31), iatrogenic rectal rupture requiring colostomy 2/2023, and stage 5 chronic kidney disease, recent admission [facility] 12/20-12/25/24 with ischemic ATN with associated hyperkalemia and uremia (did not receive dialysis at that time) presents to the ED for eval of worsening generalized weakness, fatigue, loss of appetite, pruritus  and pain at suprapubic mcconnell site.   Patient reports that he has not felt well since suprapubic Mcconnell was exchanged.  He has been producing urine but feels as if his bladder is not completely emptying with abdominal discomfort/pressure. He spoke to his nephrologist who recommended come to the ED.  He denies fevers, chills, chest pain, shortness of breath, nausea or vomiting.  Patient s/p recent dilation and placement of SPT 1/31/25. Draining purulent yellow urine with debris. Circumferential erythema on the skin around tube site TTP.   Ptn is well known to me from previous admission, he and his wife requested to be admitted to my service, his PCP dr Denny requested for the ptn to be admitted to my service    ED findings: Labs WBC 9.79 / HCt 31.3 / SCr 7.98 / Lactate 1.4   CT A/P with Moderate B/L hydro, mildly improved, w/ ureteral wall thickening and periureteral fat stranding c/f UTI. Diffuse urinary bladder wall thickening and perivesicular fat stranding c/f cystitis. Trace intraluminal air may be related to cystitis or instrumentation.     ptn seen by Nephrology, Urology and ID    SONDRA  CKD5  Rash, seborrheic dermatitis  Anemia  Cystitis  Cellulitis at       plan  -  ptn feels better, draining clear yellow urine. no pain at SP catheter site. ptn is still w pruritis. no immediate need for HD. blood and urine Cx from 2/5 NTD. cont CTX as per ID. atarax prn itching  - Patient with recent dilation and placement of SPT. as per , SPT only to be manipulated by urology.    - Empiric antibiotics as per ID  - no immediate need for HD. ptn states wants to abstain as much as possible. ptn is euvolemic  - as per nephrology: po Bicarb, Calcium Acetate and LOKELMA  -  hold Jardiance while inptn  - pain control w oxycodone for chronic LE neuropathy    
74 year-old man with history of multiple medical issues including polio with associated neurogenic bladder/suprapubic catheter (last exchanged 1/31), iatrogenic rectal rupture requiring colostomy 2/2023, and stage 5 chronic kidney disease, recent admission [facility] 12/20-12/25/24 with ischemic ATN with associated hyperkalemia and uremia (did not receive dialysis at that time) presents to the ED for eval of worsening generalized weakness, fatigue, loss of appetite, pruritus  and pain at suprapubic mcconnell site.   Patient reports that he has not felt well since suprapubic Mcconnell was exchanged.  He has been producing urine but feels as if his bladder is not completely emptying with abdominal discomfort/pressure. He spoke to his nephrologist who recommended come to the ED.  He denies fevers, chills, chest pain, shortness of breath, nausea or vomiting.  Patient s/p recent dilation and placement of SPT 1/31/25. Draining purulent yellow urine with debris. Circumferential erythema on the skin around tube site TTP.   Ptn is well known to me from previous admission, he and his wife requested to be admitted to my service, his PCP dr Denny requested for the ptn to be admitted to my service    ED findings: Labs WBC 9.79 / HCt 31.3 / SCr 7.98 / Lactate 1.4   CT A/P with Moderate B/L hydro, mildly improved, w/ ureteral wall thickening and periureteral fat stranding c/f UTI. Diffuse urinary bladder wall thickening and perivesicular fat stranding c/f cystitis. Trace intraluminal air may be related to cystitis or instrumentation.     ptn seen by Nephrology, Urology and ID    SONDRA  CKD5  Rash, seborrheic dermatitis  Anemia  Cystitis  Cellulitis at       plan  - ptn feels better, draining clear yellow urine. has sediment. no pain at SP catheter site.  urine cx NTD   - cleared by renal and by ID for DC home w close outptn f/u. raise Lokelma to 10 gms daily. po ceftin 250 mg daily through 2/12  - ptn is still w pruritis. no immediate need for HD. atarax prn itching  - Patient with recent dilation and placement of SPT. as per , SPT only to be manipulated by urology.    - no immediate need for HD. ptn states wants to abstain as much as possible. ptn is euvolemic  - as per nephrology: po Bicarb, Calcium Acetate and LOKELMA  -  hold Jardiance while inptn  - pain control w oxycodone for chronic LE neuropathy    
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 12/20-12/25/24 with ischemic ATN with associated hyperkalemia and uremia (did not receive dialysis at that time) presents to the ED for eval of worsening generalized weakness, fatigue, loss of appetite, pruritus  and pain at suprapubic mcconnell site.    Urology consulted due to recent SPT tract dilation.    CT with Moderate B/L hydro, mildly improved, w/ ureteral wall thickening and periureteral fat stranding c/f UTI. Diffuse urinary bladderwall thickening and perivesicular fat stranding c/f cystitis. Trace intraluminal air may be related to cystitis or instrumentation. Skin thickening with subcutaneous fat stranding is seen surrounding SPT c/f cellulitis.      Recs  - No acute urologic intervention   - continue SPT --> DO NOT REMOVE SPT without paging UROLOGY FIRST, recently dilated tract    - abx   - f/u cultures    - Irrigate SPT PRN with NS to prevent debris obstruction   - Monitor urine output   - Trospium for bladder spasms  - CT scan showing urinary infection and SQ fat stranding concerning for  cellulitis   - monitor SPT site erythema  - Patient to follow-up with his urologist after discharge     [facility]  [address] [place] [address], [address]  [place] [address] [phone]   
74 year-old man with ckd , polio , paraplegia with associated neurogenic bladder/suprapubic catheter (last exchanged 1/31)presents to the ED for pressure and pain in groin following supra pubic cath change on 1/31  cellulitis abdominal   ? cystitis    ct with b/l hydroureteronephrosis  ureteral wall thickening  diffuse bladder wall thickening  ua with pyuria --    s/p vanc/meropenem 2/5  ceftriaxone 2/5     plan  afeb, nontoxic, no leukocytosis  cellulitis improving     ua with pyuria - drawn via indwelling catheter so will have pyuria at baseline  urine cx neg  blood cx neg to date    cont  ceftriaxone day 2  will switch to po in next 24-48 hours     suprapubic cath care  local care to exit site  hydrocortisone for eczematous rash    pt evaluated face to face time in addition to reviewing history,  labs, microbiology and imaging.  antibiotic stewardship, local antibiogram, infection control strategies and potential transmission issues taken in to consideration at time of  treatment decision making process.

## 2025-02-07 NOTE — DISCHARGE NOTE NURSING/CASE MANAGEMENT/SOCIAL WORK - PATIENT PORTAL LINK FT
You can access the FollowMyHealth Patient Portal offered by Health system by registering at the following website: [email or web address]. By joining [facility]’s FollowMyHealth portal, you will also be able to view your health information using other applications (apps) compatible with our system.

## 2025-02-07 NOTE — DISCHARGE NOTE NURSING/CASE MANAGEMENT/SOCIAL WORK - NSSCTYPOFSERV_GEN_ALL_CORE
for RN jesus, reinforce teaching on SPT care- make sure you are given teaching and  sent home with irrigation supplies

## 2025-02-07 NOTE — PROGRESS NOTE ADULT - REASON FOR ADMISSION
SONDRA, Cellulitis, Cystitis

## 2025-02-07 NOTE — DISCHARGE NOTE PROVIDER - NSDCFUADDAPPT_GEN_ALL_CORE_FT
APPTS ARE READY TO BE MADE: [X] YES    Best Family or Patient Contact (if needed):    Additional Information about above appointments (if needed):    1: PCP/nephrologist Dr. Denny  2: Outpatient [facility] urologist 1-2 weeks after discharge  3:     Other comments or requests:    APPTS ARE READY TO BE MADE: [X] YES    Best Family or Patient Contact (if needed):    Additional Information about above appointments (if needed):    1: PCP/nephrologist Dr. Denny  2: Outpatient [facility] urologist 1-2 weeks after discharge  3:     Other comments or requests:     Patient was outreached but did not answer. A voicemail was left for the patient to return our call.

## 2025-02-07 NOTE — DISCHARGE NOTE PROVIDER - NSDCFUSCHEDAPPT_GEN_ALL_CORE_FT
[facility]  UROLOGY [address] [place] [address]  Scheduled Appointment: 02/28/2025    Ace Schrader  [facility]  UROLOGY [address] [place] [address]  Scheduled Appointment: 02/28/2025     Eloy Swenson  [facility] Physician [facility]  VASCULAR [address]  Scheduled Appointment: 03/03/2025

## 2025-02-16 RX ORDER — ATORVASTATIN CALCIUM 80 MG/1
1 TABLET, FILM COATED ORAL
Refills: 0 | DISCHARGE

## 2025-02-16 NOTE — ED PROVIDER NOTE - PHYSICAL EXAMINATION
Nigel Truner DO (PGY1)   Physical Exam:    Gen: NAD, AOx3  Head: NCAT  HEENT: EOMI, PEERLA, pink and moist mucous membranes  Lung: CTAB, no respiratory distress, no wheezes/rhonchi/rales B/L  CV: RRR, no murmurs, rubs or gallops  Abd: soft, NT, ND, no guarding, no rigidity, no rebound tenderness, no CVA tenderness   MSK:  wheelchair-bound at baseline,  tenderness to palpation at the left ASIS upon palpation, left anterior knee with pain upon valgus stress, tenderness upon palpation of the right anterior knee  Neuro: No focal sensory or motor deficits. Sensation intact to light touch all extremities  Skin:  overlying abrasions on the left anterior knee, left anterior cheek, and overlying the medial chin.  Superficial  Psych: normal affect, calm

## 2025-02-16 NOTE — CONSULT NOTE ADULT - SUBJECTIVE AND OBJECTIVE BOX
HPI: Mr. 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 He is s/p admissions at [facility] 12/20-12/25/24 and 2/4-2/7 with SONDRA on CKD with associated uremic symptoms. He has expressed strong interest to try to hold off with HD intiation of late, and we have abided by his wishes. His SONDRA and uremic symptoms significantly improved after the first admission; they improved a to mild extent during the 2nd admission to the point where he was able to be discharged without HD. Since then, however, he has worsened further. He has been suffering from progressively worsening diffuse pruritus, loss of appetite, and generalized weakness. In speaking with him and is family over the past few days, we had initially planned that he would present to the [facility] ER Monday 2/17 (ie tomorrow) for HD initiation. Today, however, he fell and sustained trauma to his knee. Given the fall and concern for knee fracture, he presented to the ER today.      PAST MEDICAL & SURGICAL HISTORY:  Polio - wheelchair-dependent  Neurogenic bladder - suprapubic catheter  Erectile dysfunction  HTN  HLD  Colostomy/reversal  DVT  B/L LE fracture ==>ORIF  Cataract surgery - right  Shoulder surgeryPatient is a 74y old  Male who presents with a chief complaint of         Allergies  No Known Allergies    SOCIAL HISTORY:  Denies ETOh,Smoking,     FAMILY HISTORY:  FH: type 2 diabetes    REVIEW OF SYSTEMS:  CONSTITUTIONAL: (+)generalized weakness, (+)loss of appetite; no fevers or chills  EYES/ENT: No visual changes;  No vertigo or throat pain   NECK: No pain or stiffness  RESPIRATORY: No cough, wheezing, hemoptysis; No shortness of breath  CARDIOVASCULAR: No chest pain or palpitations  GASTROINTESTINAL: No abdominal or epigastric pain. No nausea, vomiting, or hematemesis; No diarrhea or constipation. No melena or hematochezia.  NEUROLOGICAL: (+)b/l LE weakness, (+)knee pain  SKIN: No itching, burning, rashes, or lesions   All other review of systems is negative unless indicated above.    VITAL:  T(C): , Max: 36.5 (02-16-25 @ 15:03)  T(F): , Max: 97.7 (02-16-25 @ 15:03)  HR: 94 (02-16-25 @ 15:03)  BP: 143/78 (02-16-25 @ 15:03)  RR: 20 (02-16-25 @ 15:03)  SpO2: 99% (02-16-25 @ 15:03)    PHYSICAL EXAM:  Constitutional: NAD, Alert  HEENT: NCAT, MMM  Neck: Supple, No JVD  Respiratory: CTA-b/l  Cardiovascular: RRR s1s2, no m/r/g  Gastrointestinal: BS+, soft, NT/ND  Extremities: No peripheral edema b/l  Neurological: no focal deficits; strength grossly intact  Back: no CVAT b/l  Skin: No rashes, no nevi    ASSESSMENT:  (1)Renal - CKD5 - uremic - indicated for initiation of chronic HD  (2)Vascular - needing HD access  (3)Fall - ?knee fracture    RECOMMEND:  (1)Vascular evaluation (ok for a.m.) - needs shiley placement tomorrow  (2)HD x 3 consecutive days once shiley placed tomorrow  (3)HBV/HCV studies (in anticipation of HD)  (4)Can plan for setup of outpatient HD at [facility]) - will plan fro eventual efforts to transition from in-center to home HD within the ensuing months  (5)Meds for GFR<10  (6)Renal diet    Thank you for involving [facility] in this patient's care.    With warm regards,    Rafita Denny MD   [facility]  Office: [phone]  Cell: [phone]               HPI: Mr. 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 He is s/p admissions at [facility] 12/20-12/25/24 and 2/4-2/7 with SONDRA on CKD with associated uremic symptoms. He has expressed strong interest to try to hold off with HD intiation of late, and we have abided by his wishes. His SONDRA and uremic symptoms significantly improved after the first admission; they improved a to mild extent during the 2nd admission to the point where he was able to be discharged without HD. Since then, however, he has worsened further. He has been suffering from progressively worsening diffuse pruritus, loss of appetite, and generalized weakness. In speaking with him and is family over the past few days, we had initially planned that he would present to the [facility] ER Monday 2/17 (ie tomorrow) for HD initiation. Today, however, he fell and sustained trauma to his knee. Given the fall and concern for knee fracture, he presented to the ER today.      PAST MEDICAL & SURGICAL HISTORY:  Polio - wheelchair-dependent  Neurogenic bladder - suprapubic catheter  Erectile dysfunction  HTN  HLD  Colostomy/reversal  DVT  B/L LE fracture ==>ORIF  Cataract surgery - right  Shoulder surgeryPatient is a 74y old  Male who presents with a chief complaint of         Allergies  No Known Allergies    SOCIAL HISTORY:  Denies ETOh,Smoking,     FAMILY HISTORY:  FH: type 2 diabetes    REVIEW OF SYSTEMS:  CONSTITUTIONAL: (+)generalized weakness, (+)loss of appetite; no fevers or chills  EYES/ENT: No visual changes;  No vertigo or throat pain   NECK: No pain or stiffness  RESPIRATORY: No cough, wheezing, hemoptysis; No shortness of breath  CARDIOVASCULAR: No chest pain or palpitations  GASTROINTESTINAL: No abdominal or epigastric pain. No nausea, vomiting, or hematemesis; No diarrhea or constipation. No melena or hematochezia.  NEUROLOGICAL: (+)b/l LE weakness, (+)knee pain  SKIN: No itching, burning, rashes, or lesions   All other review of systems is negative unless indicated above.    VITAL:  T(C): , Max: 36.5 (02-16-25 @ 15:03)  T(F): , Max: 97.7 (02-16-25 @ 15:03)  HR: 94 (02-16-25 @ 15:03)  BP: 143/78 (02-16-25 @ 15:03)  RR: 20 (02-16-25 @ 15:03)  SpO2: 99% (02-16-25 @ 15:03)    PHYSICAL EXAM:  Constitutional: NAD, Alert  HEENT: NCAT, MMM  Neck: Supple, No JVD  Respiratory: CTA-b/l  Cardiovascular: RRR s1s2, no m/r/g  Gastrointestinal: BS+, soft, NT/ND  Extremities: No peripheral edema b/l  Neurological: no focal deficits; strength grossly intact  Back: no CVAT b/l  Skin: No rashes, no nevi  Vascular access: none    ASSESSMENT:  (1)Renal - CKD5 - uremic - indicated for initiation of chronic HD  (2)Vascular - needing HD access  (3)Fall - knee trauma    RECOMMEND:  (1)Vascular evaluation (ok for a.m.) - needs shiley placement tomorrow  (2)HD x 3 consecutive days once shiley placed tomorrow  (3)HBV/HCV studies (in anticipation of HD)  (4)Can plan for setup of outpatient HD at [facility]) - will plan fro eventual efforts to transition from in-center to home HD within the ensuing months  (5)Meds for GFR<10  (6)Renal diet    counseled patient and daughters at bedside regarding plan for HD x 3 consecutive days. Counseled regarding the plan regarding access; explained that we would begin with shiley tomorrow, have catheter changed to tunneled catheter prior to discharge, and plan as well for AV access at some point (either this admission or after discharge)    Thank you for involving [facility] in this patient's care.    With warm regards,    Rafita Denny MD   [facility]  Office: [phone]  Cell: [phone]

## 2025-02-16 NOTE — ED ADULT NURSE NOTE - OBJECTIVE STATEMENT
Bladder outlet obstruction BPH with obstruction/lower urinary tract symptoms s/p SPT, Colostomy status DVT, lower extremity Erectile dysfunction   H/O cardiac murmur H/O urinary retention suprapubic tube HLD (hyperlipidemia) Hypertension Polio Presence of suprapubic catheter 74M aaox4 ambulatory with a motorized wheelchair came to ED with c/o s/p fell from his motorized wheelchair during transfer on his van the wheel of the wheelchair fell on the side that cause him to fall face forward yesterday, called an EMS but patient refused to go to [facility] which was closer to his house. Patient with h/o Bladder outlet obstruction BPH with obstruction/lower urinary tract symptoms s/p SPT, Colostomy s/p reversal, DVT, lower extremity Erectile dysfunction H/O cardiac murmur H/O urinary retention suprapubic tube HLD (hyperlipidemia) Hypertension Polio Presence of suprapubic catheter.  Patient denies any LOC, c/o pain in the left hip area, chronic pain on both shoulders secondary to rotator cuff injury, limited ROM in left shoulder, abrasions in the chin, left facial are and left knee. Patient refused to get into a stretcher, reports that will cause him a lot of back pain, request an air mattress bed. Hospital bed requested to charge RELL Ernst. Awaiting hospital bed and xrays. Rt AC 20g IV access inserted, labs sent pending results. Patient reports that he will be admitted tomorrow for a Hemodialysis catheter placement, unable to know if its a Fistula or a shiley placement. VS WDL.

## 2025-02-16 NOTE — PATIENT PROFILE ADULT - FALL HARM RISK - HARM RISK INTERVENTIONS

## 2025-02-16 NOTE — ED ADULT NURSE NOTE - NSFALLHARMRISKINTERV_ED_ALL_ED
Assistance OOB with selected safe patient handling equipment if applicable/Communicate risk of Fall with Harm to all staff, patient, and family/Monitor gait and stability/Provide patient with walking aids/Provide visual cue: red socks, yellow wristband, yellow gown, etc/Reinforce activity limits and safety measures with patient and family/Bed in lowest position, wheels locked, appropriate side rails in place/Call bell, personal items and telephone in reach/Instruct patient to call for assistance before getting out of bed/chair/stretcher/Non-slip footwear applied when patient is off stretcher/[place] to call system/Physically safe environment - no spills, clutter or unnecessary equipment/Purposeful Proactive Rounding/Room/bathroom lighting operational, light cord in reach

## 2025-02-16 NOTE — ED PROVIDER NOTE - CLINICAL SUMMARY MEDICAL DECISION MAKING FREE TEXT BOX
Attending note.  Patient was seen in room #39 to the left.  Patient has a history of polio and is wheelchair-bound and fell out of his wheelchair to the chair yesterday landing on his knees and face.  Patient complaining of pain primarily in the left knee, lower back with abrasions to the chin and left cheek.  He denies any headache, dizziness, nausea vomiting.  Denies any cervical or thoracic pain.  Patient took 2 oxycodones 5 mg each approximately 1 PM and is no longer complaining of lower back pain.  Patient also states he has CKD and was advised to come to the hospital tomorrow to begin dialysis.  His nephrologist is Dr. Rafita Denny.  Patient also has a history of suprapubic catheter, DVT, hypertension, hyperlipidemia.  Patient was admitted for urinary tract infection from February 5-7.  He is not taking any aspirin or anticoagulants.  Denies any neck pain, chest/rib pain or abdominal pain.  Denies any pain or injury to the upper extremities.  Patient is complaining of severe itching all over his body for several weeks which has been attributed to his CKD.     ROS-as above, otherwise negative.  PE-patient is alert no acute distress.  Patient has an abrasion to the chin and left cheek.  There is no deformity.  There is no malocclusion.  Pupils are 2 mm equal reactive with bilateral IOL.  There is no cervical, thoracic or lumbar tenderness.  Chest and ribs are nontender.  Lungs are clear and equal bilaterally.  Heart is regular rate and rhythm.  Abdomen is obese, soft and nontender.  Suprapubic catheter in place draining cloudy yellow urine.  Patient has a small abrasion anterior right knee with no pain with passive range of motion.  There is no knee effusion.  There is atrophy in the right lower leg.  Patient has large abrasion on the anterior left knee without effusion.  He has pain with active range of motion and passive range of motion of the knee.  There is no pelvis, groin or hip tenderness on either side.     A/P-patient fell out of wheelchair yesterday with injury to both knees with pain in the lower back and abrasions to chin and cheek.  No concern for facial or mandibular fracture.  Patient took oxycodone at approximately 1 PM and no longer complaining of lower back pain.  He is complaining of bilateral knee pain with left worse than right.  Patient also was to come to the hospital tomorrow for admission to begin hemodialysis with Dr. Rafita Denny his nephrologist.  Labs, x-rays, analgesia and call to nephrology for admission. 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 PE remarkable for wheelchair-bound at baseline,  tenderness to palpation at the left ASIS upon palpation, left anterior knee with pain upon valgus stress, tenderness upon palpation of the right anterior knee, overlying abrasions on the left anterior knee, left anterior cheek, and overlying the medial chin.  Superficial.       Attending note.  Patient was seen in room #39 to the left.  Patient has a history of polio and is wheelchair-bound and fell out of his wheelchair to the chair yesterday landing on his knees and face.  Patient complaining of pain primarily in the left knee, lower back with abrasions to the chin and left cheek.  He denies any headache, dizziness, nausea vomiting.  Denies any cervical or thoracic pain.  Patient took 2 oxycodones 5 mg each approximately 1 PM and is no longer complaining of lower back pain.  Patient also states he has CKD and was advised to come to the hospital tomorrow to begin dialysis.  His nephrologist is Dr. Rafita Denny.  Patient also has a history of suprapubic catheter, DVT, hypertension, hyperlipidemia.  Patient was admitted for urinary tract infection from February 5-7.  He is not taking any aspirin or anticoagulants.  Denies any neck pain, chest/rib pain or abdominal pain.  Denies any pain or injury to the upper extremities.  Patient is complaining of severe itching all over his body for several weeks which has been attributed to his CKD.     ROS-as above, otherwise negative.  PE-patient is alert no acute distress.  Patient has an abrasion to the chin and left cheek.  There is no deformity.  There is no malocclusion.  Pupils are 2 mm equal reactive with bilateral IOL.  There is no cervical, thoracic or lumbar tenderness.  Chest and ribs are nontender.  Lungs are clear and equal bilaterally.  Heart is regular rate and rhythm.  Abdomen is obese, soft and nontender.  Suprapubic catheter in place draining cloudy yellow urine.  Patient has a small abrasion anterior right knee with no pain with passive range of motion.  There is no knee effusion.  There is atrophy in the right lower leg.  Patient has large abrasion on the anterior left knee without effusion.  He has pain with active range of motion and passive range of motion of the knee.  There is no pelvis, groin or hip tenderness on either side.     A/P-patient fell out of wheelchair yesterday with injury to both knees with pain in the lower back and abrasions to chin and cheek.  No concern for facial or mandibular fracture.  Patient took oxycodone at approximately 1 PM and no longer complaining of lower back pain.  He is complaining of bilateral knee pain with left worse than right.  Patient also was to come to the hospital tomorrow for admission to begin hemodialysis with Dr. Rafita Denny his nephrologist.  Labs, x-rays, analgesia and call to nephrology for admission. 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 PE remarkable for wheelchair-bound at baseline,  tenderness to palpation at the left ASIS upon palpation, left anterior knee with pain upon valgus stress, tenderness upon palpation of the right anterior knee, overlying abrasions on the left anterior knee, left anterior cheek, and overlying the medial chin.  Superficial. Differential diagnosis includes muscle strain/sprain, fracture, dislocation, end-stage renal disease needing hemodialysis.  Plan includes labs and imaging for the above any symptomatic management as necessary for the pain, and reassess      Attending note.  Patient was seen in room #39 to the left.  Patient has a history of polio and is wheelchair-bound and fell out of his wheelchair to the chair yesterday landing on his knees and face.  Patient complaining of pain primarily in the left knee, lower back with abrasions to the chin and left cheek.  He denies any headache, dizziness, nausea vomiting.  Denies any cervical or thoracic pain.  Patient took 2 oxycodones 5 mg each approximately 1 PM and is no longer complaining of lower back pain.  Patient also states he has CKD and was advised to come to the hospital tomorrow to begin dialysis.  His nephrologist is Dr. Rafita Denny.  Patient also has a history of suprapubic catheter, DVT, hypertension, hyperlipidemia.  Patient was admitted for urinary tract infection from February 5-7.  He is not taking any aspirin or anticoagulants.  Denies any neck pain, chest/rib pain or abdominal pain.  Denies any pain or injury to the upper extremities.  Patient is complaining of severe itching all over his body for several weeks which has been attributed to his CKD.     ROS-as above, otherwise negative.  PE-patient is alert no acute distress.  Patient has an abrasion to the chin and left cheek.  There is no deformity.  There is no malocclusion.  Pupils are 2 mm equal reactive with bilateral IOL.  There is no cervical, thoracic or lumbar tenderness.  Chest and ribs are nontender.  Lungs are clear and equal bilaterally.  Heart is regular rate and rhythm.  Abdomen is obese, soft and nontender.  Suprapubic catheter in place draining cloudy yellow urine.  Patient has a small abrasion anterior right knee with no pain with passive range of motion.  There is no knee effusion.  There is atrophy in the right lower leg.  Patient has large abrasion on the anterior left knee without effusion.  He has pain with active range of motion and passive range of motion of the knee.  There is no pelvis, groin or hip tenderness on either side.     A/P-patient fell out of wheelchair yesterday with injury to both knees with pain in the lower back and abrasions to chin and cheek.  No concern for facial or mandibular fracture.  Patient took oxycodone at approximately 1 PM and no longer complaining of lower back pain.  He is complaining of bilateral knee pain with left worse than right.  Patient also was to come to the hospital tomorrow for admission to begin hemodialysis with Dr. Rafita Denny his nephrologist.  Labs, x-rays, analgesia and call to nephrology for admission.

## 2025-02-16 NOTE — ED ADULT NURSE REASSESSMENT NOTE - NS ED NURSE REASSESS COMMENT FT1
Hospital was brought in by transporter and placed in patients room. Patient reports he needed a hospital bed with a remote. ANM made aware of the situation to find a remote hospital bed.

## 2025-02-16 NOTE — ED PROVIDER NOTE - PROGRESS NOTE DETAILS
Turner: The benefits and risks of a CT head were discussed with this patient the patient declines any further CT head scans, agreeable with this plan patient is well-appearing with no neurologic deficits. This patient was to have a hemodialysis catheter placed tomorrow, since the patient is ready in the hospital, this was discussed with the patient's PCP Dr. Ivey and nephrologist Dr. Denny, and we will admit the patient for hemodialysis tomorrow. Turner: The benefits and risks of a CT head were discussed with this patient the patient declines any further CT head scans, agreeable with this plan patient is well-appearing with no neurologic deficits. This patient was to have a hemodialysis catheter placed tomorrow, since the patient is ready in the hospital, this was discussed with the patient's PCP Dr. Ivey and nephrologist Dr. Denny, and we will admit the patient for hemodialysis tomorrow. patient also complaining of itching, suspect uremic pruritus, will provide antihistamine Xrays not yet performed of lower extremity.  hospital bed will not fit in radiology room.  rad. attempted to perform xrays at bedside, but pt will not allow rad to position plate or extremity for xray.

## 2025-02-16 NOTE — CONSULT NOTE ADULT - ASSESSMENT
74M with history of HTN, polio with multiple subsequent problems including LE weakness requiring wheelchair, neurogenic bladder s/p suprapubic catheter, and colostomy s/p iatrogenic rectal injury 2/2023, and CKD 5 presenting to the ED with weakness and falls. Admitted for initiation of HD.     Recs:  - Per nephrology documentation, Shiley placement tomorrow followed by TDC  - IR consult for Shiley placement  - If patient will require long term HD access please obtain BUE vein mapping with arterial diameters  - Vascular to follow should patient necessitate emergent HD access    Discussed with fellow on behalf of attending    Vascular   [id number]   74M with history of HTN, polio with multiple subsequent problems including LE weakness requiring wheelchair, neurogenic bladder s/p suprapubic catheter, and colostomy s/p iatrogenic rectal injury 2/2023, and CKD 5 presenting to the ED with weakness and falls. Admitted for initiation of HD.     Recs:  - Per nephrology documentation, Shiley placement tomorrow followed by TDC this admission  - IR consult for Shiley placement  - Coags with AM labs  - Of note patient strongly prefers no access in legs, education provided but still strongly prefers neck/chest   - If patient will require long term HD access please obtain BUE vein mapping with arterial diameters  - Vascular to follow should patient necessitate emergent HD access    Discussed with fellow on behalf of attending    Vascular   [id number]

## 2025-02-16 NOTE — ED ADULT TRIAGE NOTE - CHIEF COMPLAINT QUOTE
fall out of chair yesterday, left facial and left leg pain, no LOC  pt has scheduled catheter placement for dialysis, current suprapubic catheter in place

## 2025-02-16 NOTE — H&P ADULT - HISTORY OF PRESENT ILLNESS
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 He is well known to me from multiple recent admisions  s/p admissions at [facility] 12/20-12/25/24 and 2/4-2/7 with SONDRA on CKD with associated uremic symptoms.   At the last admission he had expressed strong interest to try to hold off with HD intiation but he has been getting worse clinically at home.  His SONDRA and uremic symptoms significantly improved after the first admission; they improved a to mild extent during the 2nd admission to the point where he was able to be discharged without HD. Since then, however, he has worsened further.   He has been suffering from progressively worsening diffuse pruritus, loss of appetite, and generalized weakness and falls.   His nephrologist and PCP has been speaking with him and his family over the past few days,   The initial plan was that he would present to the [facility] ER Monday 2/17 (ie tomorrow) for HD initiation. Today, however, he fell and sustained trauma to his knee. Given the fall and concern for knee fracture, he presented to the ER today. multiple xrays show no Fractures.

## 2025-02-16 NOTE — CONSULT NOTE ADULT - SUBJECTIVE AND OBJECTIVE BOX
VASCULAR SURGERY CONSULT NOTE    HPI:  74M with history of HTN, polio with multiple subsequent problems including LE weakness requiring wheelchair, neurogenic bladder s/p suprapubic catheter, and colostomy s/p iatrogenic rectal injury 2023, and CKD 5 presenting to the ED after falling from his wheelchair. Admitted for initiation of HD. Vascular surgery consulted for HD access. Patient reports he has had kidney problems for many years but has deferred HD. Strongly emphasizes he would not want anything in his legs, discussed what a temporary access is but patient still states would not want a catheter in the groin.       VITALS & I/Os:  Vital Signs Last 24 Hrs  T(C): 36.6 (2025 20:00), Max: 36.6 (2025 20:00)  T(F): 97.8 (2025 20:00), Max: 97.8 (2025 20:00)  HR: 87 (2025 20:00) (87 - 94)  BP: 102/55 (2025 20:00) (102/55 - 143/78)  BP(mean): 76 (2025 20:00) (76 - 76)  RR: 19 (2025 20:00) (19 - 20)  SpO2: 98% (:00) (98% - 99%)    Parameters below as of 2025 20:00  Patient On (Oxygen Delivery Method): room air        I&O's Summary      Physical Exam:  General: NAD, male appearing stated age. Abrasions to left face  Neuro: Awake, alert and oriented x3  Resp: Nonlabored breathing on RA  CV: Hemodynamically stable  GI/Abd: Soft, obese, nontender. Dressing to LLQ C/D/I. Suprapubic catheter in place  Ext: Palp femoral pulses bilat. BLE atrophic, minimal dorsi/plantarflexion bilaterally. Sensation grossly intact. LLE edematous compared to R, erythema to right toes/forefoot. No active wounds bilat.     Laboratory:                        10.7   6.87  )-----------( 340      ( -16 @ 16:43 )             34.7     -                             Phos: 7.9 M.6  132  |  95  |  101  ----------------------------<  99  5.2   |  17  |  7.76          02-16   TPro 6.8 / Alb 3.2[L] / TBili 0.3 / DBili x  / AST/AST 10/7[L] / AlkPhos 103    PT/INR/PTT - ( @ 03:54) PT: 15.9 sec; INR: 1.40 ratio ; PTT: 30.7 sec          Urinalysis Basic - ( 2025 16:43 )    Color: x / Appearance: x / SG: x / pH: x  Gluc: 99 mg/dL / Ketone: x  / Bili: x / Urobili: x   Blood: x / Protein: x / Nitrite: x   Leuk Esterase: x / RBC: x / WBC x   Sq Epi: x / Non Sq Epi: x / Bacteria: x

## 2025-02-16 NOTE — ED PROVIDER NOTE - OBJECTIVE STATEMENT
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  Notes that while he was getting out of the van from his wheelchair, the wheel tilted and caused him to fall and hit his head.  He did not lose consciousness, is not on blood thinners, remembers the entire event.   He also hit his left knee with subsequent pain he now has overlying abrasions on the left cheek, chin, and overlying his left knee.  He is wheel chair very at baseline.  otherWise denies symptoms such as chest pain, shortness of breath including preceding symptoms or a prodrome, abdominal pain, nausea, vomiting

## 2025-02-16 NOTE — H&P ADULT - ASSESSMENT
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 He is well known to me from multiple recent admisions  s/p admissions at [facility] 12/20-12/25/24 and 2/4-2/7 with SONDRA on CKD with associated uremic symptoms.   At the last admission he had expressed strong interest to try to hold off with HD intiation but he has been getting worse clinically at home.  His SONDRA and uremic symptoms significantly improved after the first admission; they improved a to mild extent during the 2nd admission to the point where he was able to be discharged without HD. Since then, however, he has worsened further.   He has been suffering from progressively worsening diffuse pruritus, loss of appetite, and generalized weakness and falls.   His nephrologist and PCP has been speaking with him and his family over the past few days,   The initial plan was that he would present to the [facility] ER Monday 2/17 (ie tomorrow) for HD initiation. Today, however, he fell and sustained trauma to his knee. Given the fall and concern for knee fracture, he presented to the ER today. multiple xrays show no Fractures.      CKD5, uremia, requiring initiation of HD  Rash, seborrheic dermatitis  Anemia  SP catheter, chronic    plan  - Vascular evaluation (ok for a.m.) - needs shiley placement tomorrow  - HD x 3 consecutive days once shiley placed tomorrow  - HBV/HCV studies (in anticipation of HD)  -Can plan for setup of outpatient HD at [facility] ([place]) - as per nephrology will plan fro eventual efforts to transition from in-center to home HD within the ensuing months  - Renal diet  - shiley to be changed to tunneled catheter prior to discharge, and plan as well for AV access at some point (either this admission or after discharge)  - no immediate need for HD tonight  - cont outptn meds  - DVT ppx w HSC

## 2025-02-16 NOTE — H&P ADULT - NSHPPHYSICALEXAM_GEN_ALL_CORE
T(C): 36.5 (02-16-25 @ 15:03), Max: 36.5 (02-16-25 @ 15:03)  HR: 94 (02-16-25 @ 15:03) (94 - 94)  BP: 143/78 (02-16-25 @ 15:03) (143/78 - 143/78)  RR: 20 (02-16-25 @ 15:03) (20 - 20)  SpO2: 99% (02-16-25 @ 15:03) (99% - 99%)    PHYSICAL EXAM:  GENERAL: NAD, well-developed  HEAD:  Atraumatic, Normocephalic  EYES: EOMI, PERRLA, conjunctiva and sclera clear  NECK: Supple, No JVD  CHEST/LUNG: Clear to auscultation bilaterally; No wheeze  HEART: Regular rate and rhythm; No murmurs, rubs, or gallops  ABDOMEN: Soft, Nontender, Nondistended; Bowel sounds present  EXTREMITIES:  2+ Peripheral Pulses, No clubbing, cyanosis, or edema  PSYCH: AAOx3  NEUROLOGY: non-focal  SKIN: No rashes or lesions

## 2025-02-16 NOTE — PATIENT PROFILE ADULT - FUNCTIONAL ASSESSMENT - BASIC MOBILITY 6.
uses a wheel chair. is wheel chair bound  2-calculated by average uses a wheel chair. is wheel chair bound/Not able to assess (calculate score using [facility] averaging method)

## 2025-02-16 NOTE — ED ADULT NURSE REASSESSMENT NOTE - NS ED NURSE REASSESS COMMENT FT1
Patient still reports itching to the whole body even after the Benadryl, MD Nigel Turner made aware.

## 2025-02-17 ENCOUNTER — RESULT REVIEW (OUTPATIENT)
Age: 75
End: 2025-02-17

## 2025-02-17 NOTE — PROGRESS NOTE ADULT - SUBJECTIVE AND OBJECTIVE BOX
Subjective:  Doing fine NAD. seen on HD. NAD. darvin in place.     PAST MEDICAL & SURGICAL HISTORY:  Polio - wheelchair-dependent  Neurogenic bladder - suprapubic catheter  Erectile dysfunction  HTN  HLD  Colostomy/reversal  DVT  B/L LE fracture ==>ORIF  Cataract surgery - right  Shoulder surgeryPatient is a 74y old  Male who presents with a chief complaint of         Allergies  No Known Allergies    SOCIAL HISTORY:  Denies ETOh,Smoking,     FAMILY HISTORY:  FH: type 2 diabetes    REVIEW OF SYSTEMS:  CONSTITUTIONAL: (+)generalized weakness, (+)loss of appetite; no fevers or chills  EYES/ENT: No visual changes;  No vertigo or throat pain   NECK: No pain or stiffness  RESPIRATORY: No cough, wheezing, hemoptysis; No shortness of breath  CARDIOVASCULAR: No chest pain or palpitations  GASTROINTESTINAL: No abdominal or epigastric pain. No nausea, vomiting, or hematemesis; No diarrhea or constipation. No melena or hematochezia.  NEUROLOGICAL: (+)b/l LE weakness, (+)knee pain  SKIN: No itching, burning, rashes, or lesions   All other review of systems is negative unless indicated above.    VITAL:  T(C): , Max: 36.5 (02-16-25 @ 15:03)  T(F): , Max: 97.7 (02-16-25 @ 15:03)  HR: 94 (02-16-25 @ 15:03)  BP: 143/78 (02-16-25 @ 15:03)  RR: 20 (02-16-25 @ 15:03)  SpO2: 99% (02-16-25 @ 15:03)    PHYSICAL EXAM:  Constitutional: NAD, Alert  HEENT: NCAT, MMM  Neck: Supple, No JVD  Respiratory: CTA-b/l  Cardiovascular: RRR s1s2, no m/r/g  Gastrointestinal: BS+, soft, NT/ND  Extremities: No peripheral edema b/l  Neurological: no focal deficits; strength grossly intact  Back: no CVAT b/l  Skin: No rashes, no nevi  Vascular access: none    ASSESSMENT:  (1)Renal - CKD5 - uremic - indicated for initiation of chronic HD  (2)Vascular - s/p shiley R IJ   (3)Fall - knee trauma    RECOMMEND:  (0)Obtain iron panel + ferritin in AM  (1)1st HD session today. seen on HD. no issues  (2)HD x2 more days coming up   (3)Ca acetate 2001 TID Wm.   (3)can STOP sodium bicarb and lokelma at this point now that pt is on HD.   (4)Can plan for setup of outpatient HD at [facility] ([place]) - will plan fro eventual efforts to transition from in-center to home HD within the ensuing months  (5)Meds for GFR<10  (6)Renal diet    Thank you for involving [facility] in this patient's care.    With warm regards,    Fletcher Whitehead DO   [facility]  Office: [phone]  Cell: [phone]

## 2025-02-17 NOTE — PROGRESS NOTE ADULT - SUBJECTIVE AND OBJECTIVE BOX
Patient is a 74y old  Male who presents with a chief complaint of ESRD requiring HD, uremia (17 Feb 2025 17:09)      SUBJECTIVE / OVERNIGHT EVENTS: seen in HD. states his feet hurt, R foot toes look erythematous, nontender, no edema. podiatry called    MEDICATIONS  (STANDING):  atorvastatin 40 milliGRAM(s) Oral at bedtime  calcium acetate 2001 milliGRAM(s) Oral three times a day with meals  chlorhexidine 4% Liquid 1 Application(s) Topical <User Schedule>  heparin   Injectable 5000 Unit(s) SubCutaneous every 8 hours  ketoconazole 2% Shampoo 1 Application(s) Topical <User Schedule>  lidocaine   4% Patch 1 Patch Transdermal once  pantoprazole    Tablet 40 milliGRAM(s) Oral before breakfast    MEDICATIONS  (PRN):  acetaminophen     Tablet .. 650 milliGRAM(s) Oral every 6 hours PRN Temp greater or equal to 38C (100.4F), Mild Pain (1 - 3)  oxyCODONE    IR 5 milliGRAM(s) Oral every 8 hours PRN Moderate Pain (4 - 6)  sodium chloride 0.9% lock flush 10 milliLiter(s) IV Push every 1 hour PRN Pre/post blood products, medications, blood draw, and to maintain line patency      Vital Signs Last 24 Hrs  T(F): 98.5 (02-17-25 @ 15:51), Max: 98.5 (02-17-25 @ 15:51)  HR: 62 (02-17-25 @ 15:51) (62 - 87)  BP: 138/75 (02-17-25 @ 15:51) (102/55 - 152/82)  RR: 18 (02-17-25 @ 15:51) (17 - 19)  SpO2: 97% (02-17-25 @ 15:51) (95% - 100%)  Telemetry:   CAPILLARY BLOOD GLUCOSE        I&O's Summary    16 Feb 2025 07:01  -  17 Feb 2025 07:00  --------------------------------------------------------  IN: 240 mL / OUT: 900 mL / NET: -660 mL    17 Feb 2025 07:01  -  17 Feb 2025 19:09  --------------------------------------------------------  IN: 0 mL / OUT: 0 mL / NET: 0 mL        PHYSICAL EXAM:  GENERAL: NAD, well-developed  HEAD:  Atraumatic, Normocephalic  EYES: EOMI, PERRLA, conjunctiva and sclera clear  NECK: Supple, No JVD  CHEST/LUNG: Clear to auscultation bilaterally; No wheeze  HEART: Regular rate and rhythm; No murmurs, rubs, or gallops  ABDOMEN: Soft, Nontender, Nondistended; Bowel sounds present  EXTREMITIES:  2+ Peripheral Pulses, No clubbing, cyanosis, or edema  PSYCH: AAOx3  NEUROLOGY: non-focal  SKIN: No rashes or lesions    LABS:                        8.2    6.38  )-----------( 248      ( 17 Feb 2025 11:37 )             26.7     02-17    132[L]  |  94[L]  |  94[H]  ----------------------------<  100[H]  4.4   |  19[L]  |  7.71[H]    Ca    7.6[L]      17 Feb 2025 11:37  Phos  7.9     02-16  Mg     2.4     02-17    TPro  6.8  /  Alb  3.2[L]  /  TBili  0.3  /  DBili  x   /  AST  10  /  ALT  7[L]  /  AlkPhos  103  02-16    PT/INR - ( 17 Feb 2025 11:37 )   PT: 14.7 sec;   INR: 1.28 ratio         PTT - ( 17 Feb 2025 11:37 )  PTT:32.3 sec      Urinalysis Basic - ( 17 Feb 2025 11:37 )    Color: x / Appearance: x / SG: x / pH: x  Gluc: 100 mg/dL / Ketone: x  / Bili: x / Urobili: x   Blood: x / Protein: x / Nitrite: x   Leuk Esterase: x / RBC: x / WBC x   Sq Epi: x / Non Sq Epi: x / Bacteria: x        RADIOLOGY & ADDITIONAL TESTS:    Imaging Personally Reviewed:    Consultant(s) Notes Reviewed:      Care Discussed with Consultants/Other Providers:

## 2025-02-17 NOTE — CHART NOTE - NSCHARTNOTEFT_GEN_A_CORE
IR was consulted for non tunneled HD catheter placement.   If urgent HD is needed today, please contact vascular surgery.   Otherwise, can tentatively plan for non tunneled HD catheter placement on Tuesday with IR. Team to contact IR if a non tunneled catheter is requested for Tuesday with IR.

## 2025-02-17 NOTE — PROGRESS NOTE ADULT - ASSESSMENT
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 He is well known to me from multiple recent admisions  s/p admissions at [facility] 12/20-12/25/24 and 2/4-2/7 with SONDRA on CKD with associated uremic symptoms.   At the last admission he had expressed strong interest to try to hold off with HD intiation but he has been getting worse clinically at home.  His SONDRA and uremic symptoms significantly improved after the first admission; they improved a to mild extent during the 2nd admission to the point where he was able to be discharged without HD. Since then, however, he has worsened further.   He has been suffering from progressively worsening diffuse pruritus, loss of appetite, and generalized weakness and falls.   His nephrologist and PCP has been speaking with him and his family over the past few days,   The initial plan was that he would present to the [facility] ER Monday 2/17 (ie tomorrow) for HD initiation. Today, however, he fell and sustained trauma to his knee. Given the fall and concern for knee fracture, he presented to the ER today. multiple xrays show no Fractures.      CKD5, uremia, requiring initiation of HD  Rash, seborrheic dermatitis  Anemia  SP catheter, chronic    plan  - DEYA boston placed. seen in HD. tolerating HD well  - plan as per renal: HD x 3 consecutive days  - HBV/HCV studies (in anticipation of HD)  -Can plan for setup of outpatient HD at [facility]) - as per nephrology will plan fro eventual efforts to transition from in-center to home HD within the ensuing months  - Renal diet  - yuliyaley to be changed to tunneled catheter prior to discharge, and plan as well for AV access at some point (either this admission or after discharge)  - states his feet hurt, R foot toes look erythematous, nontender, no edema. podiatry called  - cont outptn meds  - DVT ppx w HSC

## 2025-02-17 NOTE — PROGRESS NOTE ADULT - ASSESSMENT
74M with history of HTN, polio with multiple subsequent problems including LE weakness requiring wheelchair, neurogenic bladder s/p suprapubic catheter, and colostomy s/p iatrogenic rectal injury 2/2023, and CKD 5 presenting to the ED with weakness and falls. Admitted for initiation of HD now s/p R IJ shiley placement on 2/17/25.     Recs:  - IR darvin placed bedside; fu CXR, it okay, can be used for HD    - If patient will require long term HD access please obtain BUE vein mapping with arterial diameters    Vascular   [id number]

## 2025-02-17 NOTE — CONSULT NOTE ADULT - SUBJECTIVE AND OBJECTIVE BOX
CHIEF COMPLAINT:    HISTORY OF PRESENT ILLNESS:  74M with history of HTN, polio with multiple subsequent problems including LE weakness requiring wheelchair, neurogenic bladder s/p suprapubic catheter, and colostomy s/p iatrogenic rectal injury 2/2023, and CKD 5 presenting to the ED after falling from his wheelchair. Admitted for initiation of HD. Vascular surgery consulted for HD access. Patient reports he has had kidney problems for many years but has deferred HD. Strongly emphasizes he would not want anything in his legs, discussed what a temporary access is but patient still states would not want a catheter in the groin.     PAST MEDICAL & SURGICAL HISTORY:  Polio      Hypertension      H/O urinary retention  suprapubic tube      HLD (hyperlipidemia)      BPH with obstruction/lower urinary tract symptoms      Erectile dysfunction      Bladder outlet obstruction      Presence of suprapubic catheter      Colostomy status      DVT, lower extremity      H/O cardiac murmur      S/P colostomy  2/2024      Suprapubic catheter      S/P ORIF (open reduction internal fixation) fracture  b/l legs      S/P cataract surgery  right      H/O shoulder surgery              MEDICATIONS:  heparin   Injectable 5000 Unit(s) SubCutaneous every 8 hours        acetaminophen     Tablet .. 650 milliGRAM(s) Oral every 6 hours PRN  oxyCODONE    IR 5 milliGRAM(s) Oral every 8 hours PRN    pantoprazole    Tablet 40 milliGRAM(s) Oral before breakfast    atorvastatin 40 milliGRAM(s) Oral at bedtime    calcium acetate 2001 milliGRAM(s) Oral three times a day with meals  ketoconazole 2% Shampoo 1 Application(s) Topical <User Schedule>  lidocaine   4% Patch 1 Patch Transdermal once  sodium bicarbonate 1950 milliGRAM(s) Oral three times a day      FAMILY HISTORY:  FH: type 2 diabetes        SOCIAL HISTORY:    [ ] Non-smoker  [ ] Smoker  [ ] Alcohol    Allergies    No Known Allergies    Intolerances    	    REVIEW OF SYSTEMS:  CONSTITUTIONAL: No fever, weight loss, or fatigue  EYES: No eye pain, visual disturbances, or discharge  ENMT:  No difficulty hearing, tinnitus, vertigo; No sinus or throat pain  NECK: No pain or stiffness  RESPIRATORY: No cough, wheezing, chills or hemoptysis; No Shortness of Breath  CARDIOVASCULAR: No chest pain, palpitations, passing out, dizziness, or leg swelling  GASTROINTESTINAL: No abdominal or epigastric pain. No nausea, vomiting, or hematemesis; No diarrhea or constipation. No melena or hematochezia.  GENITOURINARY: No dysuria, frequency, hematuria, or incontinence  NEUROLOGICAL: No headaches, memory loss, loss of strength, numbness, or tremors  SKIN: No itching, burning, rashes, or lesions   LYMPH Nodes: No enlarged glands  ENDOCRINE: No heat or cold intolerance; No hair loss  MUSCULOSKELETAL: No joint pain or swelling; No muscle, back, or extremity pain  PSYCHIATRIC: No depression, anxiety, mood swings, or difficulty sleeping  HEME/LYMPH: No easy bruising, or bleeding gums  ALLERY AND IMMUNOLOGIC: No hives or eczema	    [ ] All others negative	  [ ] Unable to obtain    PHYSICAL EXAM:  T(C): 36.4 (02-17-25 @ 06:23), Max: 36.6 (02-16-25 @ 20:00)  HR: 84 (02-17-25 @ 06:23) (84 - 94)  BP: 150/90 (02-17-25 @ 06:23) (102/55 - 152/82)  RR: 18 (02-17-25 @ 06:23) (18 - 20)  SpO2: 97% (02-17-25 @ 06:23) (97% - 100%)  Wt(kg): --  I&O's Summary    16 Feb 2025 07:01  -  17 Feb 2025 07:00  --------------------------------------------------------  IN: 240 mL / OUT: 900 mL / NET: -660 mL        Appearance: Normal	  HEENT:   Normal oral mucosa, PERRL, EOMI	  Lymphatic: No lymphadenopathy  Cardiovascular: Normal S1 S2, No JVD, No murmurs, No edema  Respiratory: Lungs clear to auscultation	  Psychiatry: A & O x 3, Mood & affect appropriate  Skin: No rashes, No ecchymoses, No cyanosis	  Neurologic: Non-focal  GI/Abd: Soft, obese, nontender. Dressing to [facility] C/D/I. Suprapubic catheter in place  Ext: Palp femoral pulses bilat. BLE atrophic, minimal dorsi/plantarflexion bilaterally. Sensation grossly intact. LLE edematous compared to R, erythema to right toes/forefoot. No active wounds bilat.   Vascular: Peripheral pulses palpable 2+ bilaterally    TELEMETRY: 	    ECG:  	  RADIOLOGY:  OTHER: 	  	  LABS:	 	    CARDIAC MARKERS:                                  10.7   6.87  )-----------( 340      ( 16 Feb 2025 16:43 )             34.7     02-16    132[L]  |  95[L]  |  101[H]  ----------------------------<  99  5.2   |  17[L]  |  7.76[H]    Ca    8.3[L]      16 Feb 2025 16:43  Phos  7.9     02-16  Mg     2.6     02-16    TPro  6.8  /  Alb  3.2[L]  /  TBili  0.3  /  DBili  x   /  AST  10  /  ALT  7[L]  /  AlkPhos  103  02-16    proBNP:   Lipid Profile:   HgA1c:   TSH:

## 2025-02-17 NOTE — CONSULT NOTE ADULT - PROBLEM SELECTOR RECOMMENDATION 9
Admitted for initiation od HD  Vascular surgery consulted for HD access.  Acceptable cardiac risk to proceed with AVF

## 2025-02-17 NOTE — PROVIDER CONTACT NOTE (OTHER) - ASSESSMENT
pt came from ED and Rn informed him that we will be turning him- he stated he was in pain and he wanted oxy- oxy was given to him and after an hour of getting the oxy Rn informed him that we will be turning him- pt cont to refuse stated "I probably wont turn for a couple of days" pt came from ED and Rn informed him that we will be turning him- he stated he was in pain and he wanted oxy- oxy was given to him and after an hour of getting the oxy Rn informed him that we will be turning him- pt cont to refuse stated "I probably wont turn for a couple of days" explained the importance of turning and preventing bed sores- pt cont to refuse

## 2025-02-17 NOTE — PROGRESS NOTE ADULT - SUBJECTIVE AND OBJECTIVE BOX
SURGERY DAILY PROGRESS NOTE    24 Hour/Overnight Events: No acute events overnight    SUBJECTIVE: Patient seen and evaluated on AM rounds.   ------------------------------------------------------------------------------------------------------------  OBJECTIVE:  Vital Signs Last 24 Hrs  T(C): 36.4 (17 Feb 2025 06:23), Max: 36.6 (16 Feb 2025 20:00)  T(F): 97.6 (17 Feb 2025 06:23), Max: 97.8 (16 Feb 2025 20:00)  HR: 84 (17 Feb 2025 06:23) (84 - 94)  BP: 150/90 (17 Feb 2025 06:23) (102/55 - 152/82)  BP(mean): 76 (16 Feb 2025 20:00) (76 - 76)  RR: 18 (17 Feb 2025 06:23) (18 - 20)  SpO2: 97% (17 Feb 2025 06:23) (97% - 100%)    Parameters below as of 17 Feb 2025 06:23  Patient On (Oxygen Delivery Method): room air      I&O's Detail    16 Feb 2025 07:01  -  17 Feb 2025 07:00  --------------------------------------------------------  IN:    Oral Fluid: 240 mL  Total IN: 240 mL    OUT:    Indwelling Catheter - Suprapubic (mL): 900 mL  Total OUT: 900 mL    Total NET: -660 mL          LABS:                        10.7   6.87  )-----------( 340      ( 16 Feb 2025 16:43 )             34.7     02-16    132[L]  |  95[L]  |  101[H]  ----------------------------<  99  5.2   |  17[L]  |  7.76[H]    Ca    8.3[L]      16 Feb 2025 16:43  Phos  7.9     02-16  Mg     2.6     02-16    TPro  6.8  /  Alb  3.2[L]  /  TBili  0.3  /  DBili  x   /  AST  10  /  ALT  7[L]  /  AlkPhos  103  02-16    LIVER FUNCTIONS - ( 16 Feb 2025 16:43 )  Alb: 3.2 g/dL / Pro: 6.8 g/dL / ALK PHOS: 103 U/L / ALT: 7 U/L / AST: 10 U/L / GGT: x             Urinalysis Basic - ( 16 Feb 2025 16:43 )    Color: x / Appearance: x / SG: x / pH: x  Gluc: 99 mg/dL / Ketone: x  / Bili: x / Urobili: x   Blood: x / Protein: x / Nitrite: x   Leuk Esterase: x / RBC: x / WBC x   Sq Epi: x / Non Sq Epi: x / Bacteria: x      Physical Exam:  General: NAD, male appearing stated age. Abrasions to left face  Neuro: Awake, alert and oriented x3  Resp: Nonlabored breathing on RA  CV: Hemodynamically stable. R IJ shiley site c/d/i   GI/Abd: Soft, obese, nontender. Dressing to [facility] C/D/I. Suprapubic catheter in place  Ext: Palp femoral pulses bilat. BLE atrophic, minimal dorsi/plantarflexion bilaterally. Sensation grossly intact. LLE edematous compared to R, erythema to right toes/forefoot. No active wounds bilat.

## 2025-02-17 NOTE — CONSULT NOTE ADULT - SUBJECTIVE AND OBJECTIVE BOX
[facility]   SABINO Griffin S. Shah, Y. Patel, G. Casimir  [phone]   weekends and after hours [phone]    BRIAN DEMPSEY  74y, Male  [id number]    HPI--  HPI:  74 year-old man with  ckd , polio , paraplegia with associated neurogenic bladder/suprapubic catheter  presented s/p fall  states his wheelchair got stuck and he fell on his face and   his left knee hurts   no fever, no chills, no cough, no diarrhea, no sob  no pain around suprapubic site and no drainage     recent admissions to [facility] 12/20-12/25/24 and 2/4-2/7 with SONDRA on CKD with associated uremic symptoms and pain around suprapubic tube site. he was treated with antibx  At the last admission he had expressed strong interest to try to hold off with HD intiation but he has been getting worse clinically at home.  His SONDRA and uremic symptoms significantly improved after the first admission; they improved a to mild extent during the 2nd admission to the point where he was able to be discharged without HD. Since then, however, he has worsened further.   He has been suffering from progressively worsening diffuse pruritus, loss of appetite, and generalized weakness and falls.   His nephrologist and PCP has been speaking with him and his family over the past few days,   The initial plan was that he would present to the [facility] ER Monday 2/17  for HD initiation.     PMH/PSH--  Polio  Diabetes  Pulmonary hypertension  Hypertension  H/O urinary retention  HLD (hyperlipidemia)  BPH with obstruction/lower urinary tract symptoms  Type 2 diabetes mellitus  Erectile dysfunction  Bladder outlet obstruction  Presence of suprapubic catheter  Colostomy status  DVT, lower extremity  H/O cardiac murmur  S/P colostomy  Suprapubic catheter  H/O total hip arthroplasty, right  Presence of internal fixation gianluca in upper extremity  S/P ORIF (open reduction internal fixation) fracture  S/P cataract surgery  H/O shoulder surgery        Allergies--  No Known Allergies      Medications--  Antibiotics:   Immunologic:   Other: acetaminophen     Tablet .. PRN  atorvastatin  calcium acetate  chlorhexidine 4% Liquid  heparin   Injectable  ketoconazole 2% Shampoo  lidocaine   4% Patch  oxyCODONE    IR PRN  pantoprazole    Tablet  sodium chloride 0.9% lock flush PRN      Social History--  EtOH: denies ***  Tobacco: denies ***  Drug Use: denies ***    Family/Marital History--     FH: type 2 diabetes          Travel/Environmental/Occupational History:  nc  Review of Systems:  REVIEW OF SYSTEMS  General: no fever, no chills, no wt loss	  Ophthalmologic: no blurry vision  Respiratory and Thorax: no cough, no dyspnea  Cardiovascular: no chest pain, no palpitations  Gastrointestinal:  no nausea, no vomiting, diarrhea  Genitourinary: no dysuria, no urgency, no frequency	  Musculoskeletal: no myalgias	  Neurological:  no headache	    Physical Exam--  Vital Signs: T(F): 98.5 (02-17-25 @ 15:51), Max: 98.5 (02-17-25 @ 15:51)  HR: 62 (02-17-25 @ 15:51)  BP: 138/75 (02-17-25 @ 15:51)  RR: 18 (02-17-25 @ 15:51)  SpO2: 97% (02-17-25 @ 15:51)  Wt(kg): --  General: Nontoxic-appearing Male in no acute distress.  HEENT: AT/NC. scratches to left side of face   neck: right ij shiley catheter  Lungs: Clear bilaterally without rales, wheezing or rhonchi  Heart: Regular rate and rhythm.   Abdomen: Bowel sounds present and normoactive. Soft. Nondistended. + suprapubic cath crusted, no tender  surrounding inflammatory changes, not hot, no drainage   Extremities: No cyanosis or clubbing. + edema. left knee scratch  Skin: Warm. Dry. Good turgor. No rash. No vasculitic stigmata.  Psychiatric: Appropriate affect and mood for situation.         Laboratory & Imaging Data--  CBC                        8.2    6.38  )-----------( 248      ( 17 Feb 2025 11:37 )             26.7       Chemistries  02-17    132[L]  |  94[L]  |  94[H]  ----------------------------<  100[H]  4.4   |  19[L]  |  7.71[H]    Ca    7.6[L]      17 Feb 2025 11:37  Phos  7.9     02-16  Mg     2.4     02-17    TPro  6.8  /  Alb  3.2[L]  /  TBili  0.3  /  DBili  x   /  AST  10  /  ALT  7[L]  /  AlkPhos  103  02-16      Culture Data      < from: Xray Chest 1 View- PORTABLE-Urgent (Xray Chest 1 View- PORTABLE-Urgent .) (02.17.25 @ 10:38) >    ACC: [id number] EXAM:  XR CHEST PORTABLE URGENT 1V   ORDERED BY: EL PHELPS     PROCEDURE DATE:  02/17/2025          INTERPRETATION:  EXAMINATION: XR CHEST URGENT    CLINICAL INDICATION: shiley catheter    TECHNIQUE: Single frontal, portable view of the chest was obtained.    COMPARISON: Chest x-ray 12/20/2024.    FINDINGS:  Right-sided central venous catheter terminates in the SVC. Elevated left   hemidiaphragm.  The heart is not accurately assessed in this AP projection.  No focal consolidations  There is no pneumothorax or pleural effusion.  No acute bony abnormality.    IMPRESSION:  Clear lungs.  Right-sided central venous catheter terminates in the SVC    < end of copied text >  < from: CT Abdomen and Pelvis No Cont (02.05.25 @ 02:57) >  muscle wasting is noted.    IMPRESSION:  Moderate bilateral hydroureteronephrosis, mildly improved, with ureteral   wall thickening and periureteral fat stranding raising concern for   ascending urinary tract infection.    Diffuse urinary bladderwall thickening and perivesicular fat stranding   suggesting cystitis. Trace intraluminal air may be related to cystitis or   instrumentation.    Skin thickening with subcutaneous fat stranding is seen surrounding the   suprapubic Murillo catheter. Correlate clinically for cellulitis.    Cholelithiasis in a distended gallbladder. Right upper quadrant   ultrasound recommended for further evaluation, if clinically warranted.    < end of copied text >       [facility]   SABINO Griffin S. Shah, Y. Patel, G. Casimir  [phone]   weekends and after hours [phone]    BRIAN DEMPSEY  74y, Male  [id number]    HPI--  HPI:  74 year-old man with  ckd , polio , paraplegia with associated neurogenic bladder/suprapubic catheter  presented s/p fall  states his wheelchair got stuck and he fell on his face and   his left knee hurts   no fever, no chills, no cough, no diarrhea, no sob  no pain around suprapubic site and no drainage     recent admissions to [facility] 12/20-12/25/24 and 2/4-2/7 with SONDRA on CKD with associated uremic symptoms and pain around suprapubic tube site. he was treated with antibx ceftriaxone then changed to ceftin to complete course  At the last admission he had expressed strong interest to try to hold off with HD intiation but he has been getting worse clinically at home.  His SONDRA and uremic symptoms significantly improved after the first admission; they improved a to mild extent during the 2nd admission to the point where he was able to be discharged without HD. Since then, however, he has worsened further.   He has been suffering from progressively worsening diffuse pruritus, loss of appetite, and generalized weakness and falls.   His nephrologist and PCP has been speaking with him and his family over the past few days,   The initial plan was that he would present to the [facility] ER Monday 2/17  for HD initiation.     PMH/PSH--  Polio  Diabetes  Pulmonary hypertension  Hypertension  H/O urinary retention  HLD (hyperlipidemia)  BPH with obstruction/lower urinary tract symptoms  Type 2 diabetes mellitus  Erectile dysfunction  Bladder outlet obstruction  Presence of suprapubic catheter  Colostomy status  DVT, lower extremity  H/O cardiac murmur  S/P colostomy  Suprapubic catheter  H/O total hip arthroplasty, right  Presence of internal fixation gianluca in upper extremity  S/P ORIF (open reduction internal fixation) fracture  S/P cataract surgery  H/O shoulder surgery        Allergies--  No Known Allergies      Medications--  Antibiotics:   Immunologic:   Other: acetaminophen     Tablet .. PRN  atorvastatin  calcium acetate  chlorhexidine 4% Liquid  heparin   Injectable  ketoconazole 2% Shampoo  lidocaine   4% Patch  oxyCODONE    IR PRN  pantoprazole    Tablet  sodium chloride 0.9% lock flush PRN      Social History--  EtOH: denies ***  Tobacco: denies ***  Drug Use: denies ***    Family/Marital History--     FH: type 2 diabetes          Travel/Environmental/Occupational History:  nc  Review of Systems:  REVIEW OF SYSTEMS  General: no fever, no chills, no wt loss	  Ophthalmologic: no blurry vision  Respiratory and Thorax: no cough, no dyspnea  Cardiovascular: no chest pain, no palpitations  Gastrointestinal:  no nausea, no vomiting, diarrhea  Genitourinary: no dysuria, no urgency, no frequency	  Musculoskeletal: no myalgias	  Neurological:  no headache	    Physical Exam--  Vital Signs: T(F): 98.5 (02-17-25 @ 15:51), Max: 98.5 (02-17-25 @ 15:51)  HR: 62 (02-17-25 @ 15:51)  BP: 138/75 (02-17-25 @ 15:51)  RR: 18 (02-17-25 @ 15:51)  SpO2: 97% (02-17-25 @ 15:51)  Wt(kg): --  General: Nontoxic-appearing Male in no acute distress.  HEENT: AT/NC. scratches to left side of face   neck: right ij shiley catheter  Lungs: Clear bilaterally without rales, wheezing or rhonchi  Heart: Regular rate and rhythm.   Abdomen: Bowel sounds present and normoactive. Soft. Nondistended. + suprapubic cath crusted, no tender  surrounding inflammatory changes, not hot, no drainage   Extremities: No cyanosis or clubbing. + edema. left knee scratch  Skin: Warm. Dry. Good turgor. No rash. No vasculitic stigmata.  Psychiatric: Appropriate affect and mood for situation.         Laboratory & Imaging Data--  CBC                        8.2    6.38  )-----------( 248      ( 17 Feb 2025 11:37 )             26.7       Chemistries  02-17    132[L]  |  94[L]  |  94[H]  ----------------------------<  100[H]  4.4   |  19[L]  |  7.71[H]    Ca    7.6[L]      17 Feb 2025 11:37  Phos  7.9     02-16  Mg     2.4     02-17    TPro  6.8  /  Alb  3.2[L]  /  TBili  0.3  /  DBili  x   /  AST  10  /  ALT  7[L]  /  AlkPhos  103  02-16      Culture Data      < from: Xray Chest 1 View- PORTABLE-Urgent (Xray Chest 1 View- PORTABLE-Urgent .) (02.17.25 @ 10:38) >    ACC: [id number] EXAM:  XR CHEST PORTABLE URGENT 1V   ORDERED BY: EL PHELPS     PROCEDURE DATE:  02/17/2025          INTERPRETATION:  EXAMINATION: XR CHEST URGENT    CLINICAL INDICATION: shiley catheter    TECHNIQUE: Single frontal, portable view of the chest was obtained.    COMPARISON: Chest x-ray 12/20/2024.    FINDINGS:  Right-sided central venous catheter terminates in the SVC. Elevated left   hemidiaphragm.  The heart is not accurately assessed in this AP projection.  No focal consolidations  There is no pneumothorax or pleural effusion.  No acute bony abnormality.    IMPRESSION:  Clear lungs.  Right-sided central venous catheter terminates in the SVC    < end of copied text >  < from: CT Abdomen and Pelvis No Cont (02.05.25 @ 02:57) >  muscle wasting is noted.    IMPRESSION:  Moderate bilateral hydroureteronephrosis, mildly improved, with ureteral   wall thickening and periureteral fat stranding raising concern for   ascending urinary tract infection.    Diffuse urinary bladderwall thickening and perivesicular fat stranding   suggesting cystitis. Trace intraluminal air may be related to cystitis or   instrumentation.    Skin thickening with subcutaneous fat stranding is seen surrounding the   suprapubic Murillo catheter. Correlate clinically for cellulitis.    Cholelithiasis in a distended gallbladder. Right upper quadrant   ultrasound recommended for further evaluation, if clinically warranted.    < end of copied text >

## 2025-02-17 NOTE — CONSULT NOTE ADULT - ASSESSMENT
74 year-old man with  ckd , polio , paraplegia with associated neurogenic bladder/suprapubic catheter  presented s/p fall  ckd- esrd  2/17 rij darvin placed   r/o cellulitis around suprapubic cath      plan  no fever, no leukocytosis  spt site not hot not tender  no drainage  mild stable inflammatory changes    no acute cellulitis  monitor off anitbx    HD per renal   74 year-old man with  ckd , polio , paraplegia with associated neurogenic bladder/suprapubic catheter  presented s/p fall  ckd- esrd  2/17 rij shiley placed   r/o cellulitis around suprapubic cath      plan  no fever, no leukocytosis  spt site not hot not tender  pt completed ceftin 2/12   no drainage  mild stable inflammatory changes    no acute cellulitis  monitor off anitbx    HD per renal

## 2025-02-17 NOTE — PROVIDER CONTACT NOTE (OTHER) - ASSESSMENT
pt came from ED - stated he was in pain and that Dr Young told him and his spouse that she will order oxy for  the pain. pt refused to turn to have his skin assessed d/t the pain. and he requested any medication for sleep.

## 2025-02-17 NOTE — CONSULT NOTE ADULT - ASSESSMENT
74M with history of HTN, polio with multiple subsequent problems including LE weakness requiring wheelchair, neurogenic bladder s/p suprapubic catheter, and colostomy s/p iatrogenic rectal injury 2/2023, and CKD 5 presenting to the ED after falling from his wheelchair. Admitted for initiation of HD. Vascular surgery consulted for HD access. Patient reports he has had kidney problems for many years but has deferred HD. Strongly emphasizes he would not want anything in his legs, discussed what a temporary access is but patient still states would not want a catheter in the groin.

## 2025-02-17 NOTE — PROCEDURE NOTE - NSICDXPROCEDURE_GEN_ALL_CORE_FT
PROCEDURES:  Insertion of nontunneled central venous catheter (CVC) in patient age 5 years of age or older 17-Feb-2025 09:50:59  Basim Kapadia

## 2025-02-18 NOTE — PROGRESS NOTE ADULT - SUBJECTIVE AND OBJECTIVE BOX
SURGERY DAILY PROGRESS NOTE    24 Hour/Overnight Events: No acute events overnight    SUBJECTIVE: Patient seen and evaluated on AM rounds. ------------------------------------------------------------------------------------------------------------  OBJECTIVE:  Vital Signs Last 24 Hrs  T(C): 36.4 (18 Feb 2025 07:35), Max: 37.4 (17 Feb 2025 20:08)  T(F): 97.5 (18 Feb 2025 07:35), Max: 99.4 (17 Feb 2025 20:08)  HR: 88 (18 Feb 2025 07:35) (62 - 88)  BP: 140/55 (18 Feb 2025 07:35) (106/55 - 149/97)  BP(mean): --  RR: 18 (18 Feb 2025 07:35) (17 - 18)  SpO2: 95% (18 Feb 2025 07:35) (94% - 98%)    Parameters below as of 18 Feb 2025 07:35  Patient On (Oxygen Delivery Method): room air      I&O's Detail    17 Feb 2025 07:01  -  18 Feb 2025 07:00  --------------------------------------------------------  IN:    Oral Fluid: 240 mL  Total IN: 240 mL    OUT:    Indwelling Catheter - Suprapubic (mL): 1500 mL    Other (mL): 0 mL  Total OUT: 1500 mL    Total NET: -1260 mL          LABS:                        8.2    6.38  )-----------( 248      ( 17 Feb 2025 11:37 )             26.7     02-18    137  |  98  |  62[H]  ----------------------------<  115[H]  3.6   |  23  |  5.33[H]    Ca    7.5[L]      18 Feb 2025 08:55  Phos  5.2     02-18  Mg     2.4     02-17    TPro  5.6[L]  /  Alb  2.6[L]  /  TBili  0.3  /  DBili  x   /  AST  6[L]  /  ALT  6[L]  /  AlkPhos  90  02-18    LIVER FUNCTIONS - ( 18 Feb 2025 08:55 )  Alb: 2.6 g/dL / Pro: 5.6 g/dL / ALK PHOS: 90 U/L / ALT: 6 U/L / AST: 6 U/L / GGT: x           PT/INR - ( 17 Feb 2025 11:37 )   PT: 14.7 sec;   INR: 1.28 ratio         PTT - ( 17 Feb 2025 11:37 )  PTT:32.3 sec  Urinalysis Basic - ( 18 Feb 2025 08:55 )    Color: x / Appearance: x / SG: x / pH: x  Gluc: 115 mg/dL / Ketone: x  / Bili: x / Urobili: x   Blood: x / Protein: x / Nitrite: x   Leuk Esterase: x / RBC: x / WBC x   Sq Epi: x / Non Sq Epi: x / Bacteria: x        Physical Exam:  General: NAD, male appearing stated age. Abrasions to left face  Neuro: Awake, alert and oriented x3  Resp: Nonlabored breathing on RA  CV: Hemodynamically stable. R IJ [facility] site c/d/i   GI/Abd: Soft, obese, nontender. Dressing to [facility] C/D/I. Suprapubic catheter in place  Ext: Palp femoral pulses bilat. BLE atrophic, minimal dorsi/plantarflexion bilaterally. Sensation grossly intact. LLE edematous compared to R, erythema to right toes/forefoot. No active wounds bilat.

## 2025-02-18 NOTE — CHART NOTE - NSCHARTNOTEFT_GEN_A_CORE
Late entry:     BRIAN DEMPSEY    Notified by vascular lab of KATRIN/PVR results   Impression: Within the limits of this examination, there is no arterial perfusion to either lower extremity.      Interventions taken: On exam BLE +pain, warm, non-palpable pulses, Vascular team and Dr. Martinez made aware of findings. Awaiting further recs from vascular.      Shira Wells NP  [id number]

## 2025-02-18 NOTE — PROGRESS NOTE ADULT - SUBJECTIVE AND OBJECTIVE BOX
Subjective: Patient seen and examined. No new events except as noted.   Pt resting comfortably in dialysis     REVIEW OF SYSTEMS:    CONSTITUTIONAL: No weakness, fevers or chills  EYES/ENT: No visual changes;  No vertigo or throat pain   NECK: No pain or stiffness  RESPIRATORY: No cough, wheezing, hemoptysis; No shortness of breath  CARDIOVASCULAR: No chest pain or palpitations  GASTROINTESTINAL: No abdominal or epigastric pain. No nausea, vomiting, or hematemesis; No diarrhea or constipation. No melena or hematochezia.  GENITOURINARY: No dysuria, frequency or hematuria  NEUROLOGICAL: No numbness or weakness  SKIN: No itching, burning, rashes, or lesions   All other review of systems is negative unless indicated above.    MEDICATIONS:  MEDICATIONS  (STANDING):  atorvastatin 40 milliGRAM(s) Oral at bedtime  calcium acetate 2001 milliGRAM(s) Oral three times a day with meals  chlorhexidine 4% Liquid 1 Application(s) Topical <User Schedule>  epoetin aleah-epbx (RETACRIT) Injectable 37710 Unit(s) IV Push <User Schedule>  heparin   Injectable 5000 Unit(s) SubCutaneous every 8 hours  iron sucrose IVPB 100 milliGRAM(s) IV Intermittent <User Schedule>  ketoconazole 2% Shampoo 1 Application(s) Topical <User Schedule>  oxyCODONE    IR 5 milliGRAM(s) Oral once  pantoprazole    Tablet 40 milliGRAM(s) Oral before breakfast      PHYSICAL EXAM:  T(C): 36.4 (02-18-25 @ 07:35), Max: 37.4 (02-17-25 @ 20:08)  HR: 88 (02-18-25 @ 07:35) (62 - 88)  BP: 140/55 (02-18-25 @ 07:35) (106/55 - 149/97)  RR: 18 (02-18-25 @ 07:35) (17 - 18)  SpO2: 95% (02-18-25 @ 07:35) (94% - 98%)  Wt(kg): --  I&O's Summary    17 Feb 2025 07:01  -  18 Feb 2025 07:00  --------------------------------------------------------  IN: 240 mL / OUT: 1500 mL / NET: -1260 mL          Appearance: Normal	  HEENT:   Normal oral mucosa, PERRL, EOMI	  Lymphatic: No lymphadenopathy  Cardiovascular: Normal S1 S2, No JVD, No murmurs, No edema  Respiratory: Lungs clear to auscultation	  Psychiatry: A & O x 3, Mood & affect appropriate  Skin: No rashes, No ecchymoses, No cyanosis	  Neurologic: Non-focal  GI/Abd: Soft, obese, nontender. Dressing to [facility] C/D/I. Suprapubic catheter in place  Ext: Palp femoral pulses bilat. BLE atrophic, minimal dorsi/plantarflexion bilaterally. Sensation grossly intact. LLE edematous compared to R, erythema to right toes/forefoot. No active wounds bilat.   Vascular: Peripheral pulses palpable 2+ bilaterally    LABS:    CARDIAC MARKERS:                                8.2    6.38  )-----------( 248      ( 17 Feb 2025 11:37 )             26.7     02-18    137  |  98  |  62[H]  ----------------------------<  115[H]  3.6   |  23  |  5.33[H]    Ca    7.5[L]      18 Feb 2025 08:55  Phos  5.2     02-18  Mg     2.4     02-17    TPro  5.6[L]  /  Alb  2.6[L]  /  TBili  0.3  /  DBili  x   /  AST  6[L]  /  ALT  6[L]  /  AlkPhos  90  02-18    proBNP:   Lipid Profile:   HgA1c:   TSH:             TELEMETRY: 	    ECG:  	  RADIOLOGY:   DIAGNOSTIC TESTING:  [ ] Echocardiogram:  [ ]  Catheterization:  [ ] Stress Test:    OTHER:

## 2025-02-18 NOTE — PROGRESS NOTE ADULT - SUBJECTIVE AND OBJECTIVE BOX
Podiatry pager #: [phone]/ [phone]    Patient is a 74y old  Male who presents with a chief complaint of ESRD requiring HD, uremia (18 Feb 2025 16:27)      HPI:  74 year-old man with history of multiple medical issues including polio with associated neurogenic bladder/suprapubic catheter, iatrogenic rectal rupture requiring colostomy 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 He is well known to me from multiple recent admisions  s/p admissions at [facility] 12/20-12/25/24 and 2/4-2/7 with SONDRA on CKD with associated uremic symptoms.   At the last admission he had expressed strong interest to try to hold off with HD intiation but he has been getting worse clinically at home.  His SONDRA and uremic symptoms significantly improved after the first admission; they improved a to mild extent during the 2nd admission to the point where he was able to be discharged without HD. Since then, however, he has worsened further.   He has been suffering from progressively worsening diffuse pruritus, loss of appetite, and generalized weakness and falls.   His nephrologist and PCP has been speaking with him and his family over the past few days,   The initial plan was that he would present to the [facility] ER Monday 2/17 (ie tomorrow) for HD initiation. Today, however, he fell and sustained trauma to his knee. Given the fall and concern for knee fracture, he presented to the ER today. multiple xrays show no Fractures. (16 Feb 2025 19:41)      PAST MEDICAL & SURGICAL HISTORY:  Polio      Hypertension      H/O urinary retention  suprapubic tube      HLD (hyperlipidemia)      BPH with obstruction/lower urinary tract symptoms      Erectile dysfunction      Bladder outlet obstruction      Presence of suprapubic catheter      Colostomy status      DVT, lower extremity      H/O cardiac murmur      S/P colostomy  2/2024      Suprapubic catheter      S/P ORIF (open reduction internal fixation) fracture  b/l legs      S/P cataract surgery  right      H/O shoulder surgery          MEDICATIONS  (STANDING):  atorvastatin 40 milliGRAM(s) Oral at bedtime  calcium acetate 2001 milliGRAM(s) Oral three times a day with meals  chlorhexidine 4% Liquid 1 Application(s) Topical <User Schedule>  epoetin aleah-epbx (RETACRIT) Injectable 99017 Unit(s) IV Push <User Schedule>  gabapentin Solution 50 milliGRAM(s) Oral at bedtime  heparin   Injectable 5000 Unit(s) SubCutaneous every 8 hours  hydrOXYzine hydrochloride 25 milliGRAM(s) Oral four times a day  iron sucrose IVPB 100 milliGRAM(s) IV Intermittent <User Schedule>  ketoconazole 2% Shampoo 1 Application(s) Topical <User Schedule>  oxyCODONE    IR 10 milliGRAM(s) Oral every 6 hours  oxyCODONE  ER Tablet 15 milliGRAM(s) Oral every 12 hours  pantoprazole    Tablet 40 milliGRAM(s) Oral before breakfast  pregabalin 25 milliGRAM(s) Oral two times a day    MEDICATIONS  (PRN):  acetaminophen     Tablet .. 650 milliGRAM(s) Oral every 6 hours PRN Temp greater or equal to 38C (100.4F), Mild Pain (1 - 3)  benzonatate 100 milliGRAM(s) Oral every 8 hours PRN Cough  guaiFENesin ER 1200 milliGRAM(s) Oral every 12 hours PRN Cough  HYDROmorphone  Injectable 1 milliGRAM(s) IV Push every 4 hours PRN Severe Pain (7 - 10)  sodium chloride 0.9% lock flush 10 milliLiter(s) IV Push every 1 hour PRN Pre/post blood products, medications, blood draw, and to maintain line patency      Allergies    No Known Allergies    Intolerances        VITALS:    Vital Signs Last 24 Hrs  T(C): 37.3 (19 Feb 2025 04:42), Max: 37.3 (19 Feb 2025 04:42)  T(F): 99.1 (19 Feb 2025 04:42), Max: 99.1 (19 Feb 2025 04:42)  HR: 88 (19 Feb 2025 04:42) (67 - 94)  BP: 115/67 (19 Feb 2025 04:42) (105/69 - 143/56)  BP(mean): --  RR: 18 (19 Feb 2025 04:42) (18 - 18)  SpO2: 93% (19 Feb 2025 04:42) (93% - 97%)    Parameters below as of 19 Feb 2025 04:42  Patient On (Oxygen Delivery Method): room air        LABS:                          9.1    6.99  )-----------( 211      ( 19 Feb 2025 06:23 )             30.1       02-19    135  |  97  |  37[H]  ----------------------------<  97  4.1   |  25  |  4.12[H]    Ca    7.8[L]      19 Feb 2025 06:23  Phos  3.9     02-19  Mg     2.1     02-19    TPro  5.6[L]  /  Alb  2.6[L]  /  TBili  0.3  /  DBili  x   /  AST  6[L]  /  ALT  6[L]  /  AlkPhos  90  02-18      CAPILLARY BLOOD GLUCOSE          PT/INR - ( 17 Feb 2025 11:37 )   PT: 14.7 sec;   INR: 1.28 ratio         PTT - ( 17 Feb 2025 11:37 )  PTT:32.3 sec    LOWER EXTREMITY PHYSICAL EXAM:    Vasular: DP/PT 0_/4, B/L, CFT <_4 seconds B/L, Temperature gradient  warm to cool_, B/L.   Neuro: Epicritic sensation intact to the level of _,toes B/L.  Skin: Right greater than left: rubor to digits 1 through 5.  No open ulcerations no purulence no fluctuance no malodor or clinical signs of infection      RADIOLOGY & ADDITIONAL STUDIES:

## 2025-02-18 NOTE — PROGRESS NOTE ADULT - SUBJECTIVE AND OBJECTIVE BOX
Patient is a 74y old  Male who presents with a chief complaint of ESRD requiring HD, uremia (18 Feb 2025 09:54)      SUBJECTIVE / OVERNIGHT EVENTS: b/l pain in feet, ongoing pruritis. tolerating HD    MEDICATIONS  (STANDING):  atorvastatin 40 milliGRAM(s) Oral at bedtime  calcium acetate 2001 milliGRAM(s) Oral three times a day with meals  chlorhexidine 4% Liquid 1 Application(s) Topical <User Schedule>  epoetin aleah-epbx (RETACRIT) Injectable 05194 Unit(s) IV Push <User Schedule>  heparin   Injectable 5000 Unit(s) SubCutaneous every 8 hours  hydrOXYzine hydrochloride 25 milliGRAM(s) Oral four times a day  iron sucrose IVPB 100 milliGRAM(s) IV Intermittent <User Schedule>  ketoconazole 2% Shampoo 1 Application(s) Topical <User Schedule>  oxyCODONE    IR 10 milliGRAM(s) Oral every 6 hours  oxyCODONE  ER Tablet 15 milliGRAM(s) Oral every 12 hours  pantoprazole    Tablet 40 milliGRAM(s) Oral before breakfast  pregabalin 25 milliGRAM(s) Oral two times a day    MEDICATIONS  (PRN):  acetaminophen     Tablet .. 650 milliGRAM(s) Oral every 6 hours PRN Temp greater or equal to 38C (100.4F), Mild Pain (1 - 3)  benzonatate 100 milliGRAM(s) Oral every 8 hours PRN Cough  guaiFENesin ER 1200 milliGRAM(s) Oral every 12 hours PRN Cough  HYDROmorphone  Injectable 1 milliGRAM(s) IV Push every 4 hours PRN Severe Pain (7 - 10)  sodium chloride 0.9% lock flush 10 milliLiter(s) IV Push every 1 hour PRN Pre/post blood products, medications, blood draw, and to maintain line patency      Vital Signs Last 24 Hrs  T(F): 97.3 (02-18-25 @ 10:35), Max: 99.4 (02-17-25 @ 20:08)  HR: 67 (02-18-25 @ 10:35) (67 - 88)  BP: 143/56 (02-18-25 @ 10:35) (140/55 - 148/81)  RR: 18 (02-18-25 @ 10:35) (18 - 18)  SpO2: 96% (02-18-25 @ 10:35) (94% - 98%)  Telemetry:   CAPILLARY BLOOD GLUCOSE        I&O's Summary    17 Feb 2025 07:01  -  18 Feb 2025 07:00  --------------------------------------------------------  IN: 240 mL / OUT: 1500 mL / NET: -1260 mL    18 Feb 2025 07:01  -  18 Feb 2025 16:27  --------------------------------------------------------  IN: 0 mL / OUT: 500 mL / NET: -500 mL        PHYSICAL EXAM:  GENERAL: NAD, well-developed  HEAD:  Atraumatic, Normocephalic  EYES: EOMI, PERRLA, conjunctiva and sclera clear  NECK: Supple, No JVD  CHEST/LUNG: Clear to auscultation bilaterally; No wheeze  HEART: Regular rate and rhythm; No murmurs, rubs, or gallops  ABDOMEN: Soft, Nontender, Nondistended; Bowel sounds present  EXTREMITIES:  2+ Peripheral Pulses, No clubbing, cyanosis, or edema  PSYCH: AAOx3  NEUROLOGY: non-focal  SKIN: No rashes or lesions    LABS:                        8.2    6.38  )-----------( 248      ( 17 Feb 2025 11:37 )             26.7     02-18    137  |  98  |  62[H]  ----------------------------<  115[H]  3.6   |  23  |  5.33[H]    Ca    7.5[L]      18 Feb 2025 08:55  Phos  5.2     02-18  Mg     2.4     02-17    TPro  5.6[L]  /  Alb  2.6[L]  /  TBili  0.3  /  DBili  x   /  AST  6[L]  /  ALT  6[L]  /  AlkPhos  90  02-18    PT/INR - ( 17 Feb 2025 11:37 )   PT: 14.7 sec;   INR: 1.28 ratio         PTT - ( 17 Feb 2025 11:37 )  PTT:32.3 sec      Urinalysis Basic - ( 18 Feb 2025 08:55 )    Color: x / Appearance: x / SG: x / pH: x  Gluc: 115 mg/dL / Ketone: x  / Bili: x / Urobili: x   Blood: x / Protein: x / Nitrite: x   Leuk Esterase: x / RBC: x / WBC x   Sq Epi: x / Non Sq Epi: x / Bacteria: x        RADIOLOGY & ADDITIONAL TESTS:    Imaging Personally Reviewed:    Consultant(s) Notes Reviewed:      Care Discussed with Consultants/Other Providers:

## 2025-02-18 NOTE — PROGRESS NOTE ADULT - ASSESSMENT
Assessment/plan:    Rule out PVD bilateral lower extremities.  No open ulcerations or clinical signs of infection    Recommend KATRIN PVR bilateral lower extremities.  Pending vascular studies recommend follow-up with vascular consultation.  Reconsult podiatry as nee

## 2025-02-18 NOTE — PROGRESS NOTE ADULT - PROBLEM SELECTOR PLAN 1
Admitted for initiation of HD  s/p shiley placement 2/17  on HD Admitted for initiation of HD  s/p shiley placement 2/17  Vascular surgery consulted for HD access.  Acceptable cardiac risk to proceed with AVF.

## 2025-02-18 NOTE — PROGRESS NOTE ADULT - ASSESSMENT
74M with history of HTN, polio with multiple subsequent problems including LE weakness requiring wheelchair, neurogenic bladder s/p suprapubic catheter, and colostomy s/p iatrogenic rectal injury 2/2023, and CKD 5 presenting to the ED with weakness and falls. Admitted for initiation of HD now s/p R IJ shiley placement on 2/17/25. Needs long term access per nephro     Recs:  - IR shiley placed bedside on 2/17/25  - Please preserve nondominant arm (left side, no blood draws etc.)   - Please have IR convert shiley into permacath   - vein mapping reviewed  - Please obtain meds/cards clearance for future AVF creation     Vascular   [id number]

## 2025-02-18 NOTE — PROGRESS NOTE ADULT - SUBJECTIVE AND OBJECTIVE BOX
Overnight events noted      VITAL:  T(C): , Max: 37.4 (02-17-25 @ 20:08)  T(F): , Max: 99.4 (02-17-25 @ 20:08)  HR: 88 (02-18-25 @ 07:35)  BP: 140/55 (02-18-25 @ 07:35)  BP(mean): --  RR: 18 (02-18-25 @ 07:35)  SpO2: 95% (02-18-25 @ 07:35)  Wt(kg): --      PHYSICAL EXAM:  Constitutional: NAD, Alert  HEENT: NCAT, MMM  Neck: Supple, No JVD  Respiratory: CTA-b/l  Cardiovascular: RRR s1s2, no m/r/g  Gastrointestinal: BS+, soft, NT/ND  Extremities: No peripheral edema b/l  Neurological: no focal deficits; strength grossly intact  Back: no CVAT b/l  Skin: No rashes, no nevi  Vascular access: none    LABS:                        8.2    6.38  )-----------( 248      ( 17 Feb 2025 11:37 )             26.7     Na(132)/K(4.4)/Cl(94)/HCO3(19)/BUN(94)/Cr(7.71)Glu(100)/Ca(7.6)/Mg(2.4)/PO4(--)    02-17 @ 11:37  Na(132)/K(5.2)/Cl(95)/HCO3(17)/BUN(101)/Cr(7.76)Glu(99)/Ca(8.3)/Mg(2.6)/PO4(7.9)    02-16 @ 16:43        IMPRESSION: 74M w/ polio, neurogenic bladder/suprapubic catheter, 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 S/p 1st HD yesterday; planned for 2nd HD today and 3rd HD tomorrow  (2)Vascular - s/p shiley placement yesterday by Vascular with HD afterwards. Ideally, an AV access could be placed this admission. He will also need conversion of shiley to tunneled cath by IR prior to discharge  (3)Hyperphosphatemia - at least partly responsible for his pruritus - should improve with HD + Phoslo  (4)Anemia - indicated for IV iron + Retacrit with HD  (5)Dispo - would like to get him accepted to [facility] in [place] for in-center HD for now. Eventually, we would look to try to transition him to home HD.      RECOMMEND:  (1)HD today and tomorrow as ordered; will add Retacrit + Venofer with HD to drive up H/H  (2)Phoslo as ordered  (3)IR for conversion to nontunneled cath later this week  (4)F/U with Vascular for AV access placement  (5)Efforts to have patient accepted to [facility] for outside HD      Rafita Denny MD  [facility]  Office/on call physician: [phone]  Cell (7a-7p): [phone]       seen on HD  complains of b/l LE pain      VITAL:  T(C): , Max: 37.4 (02-17-25 @ 20:08)  T(F): , Max: 99.4 (02-17-25 @ 20:08)  HR: 88 (02-18-25 @ 07:35)  BP: 140/55 (02-18-25 @ 07:35)  BP(mean): --  RR: 18 (02-18-25 @ 07:35)  SpO2: 95% (02-18-25 @ 07:35)  Wt(kg): --      PHYSICAL EXAM:  Constitutional: NAD, Alert  HEENT: NCAT, MMM  Neck: Supple, No JVD  Respiratory: CTA-b/l  Cardiovascular: RRR s1s2, no m/r/g  Gastrointestinal: BS+, soft, NT/ND  Extremities: No peripheral edema b/l  Neurological: no focal deficits; strength grossly intact  Back: no CVAT b/l  Skin: No rashes, no nevi  Vascular access: DEYA boston - accessed    LABS:                        8.2    6.38  )-----------( 248      ( 17 Feb 2025 11:37 )             26.7     Na(132)/K(4.4)/Cl(94)/HCO3(19)/BUN(94)/Cr(7.71)Glu(100)/Ca(7.6)/Mg(2.4)/PO4(--)    02-17 @ 11:37  Na(132)/K(5.2)/Cl(95)/HCO3(17)/BUN(101)/Cr(7.76)Glu(99)/Ca(8.3)/Mg(2.6)/PO4(7.9)    02-16 @ 16:43        IMPRESSION: 74M w/ polio, neurogenic bladder/suprapubic catheter, 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 S/p 1st HD yesterday; receiving 2nd HD now and planned for 3rd HD tomorrow  (2)Vascular - s/p shiley placement yesterday by Vascular with HD afterwards. Ideally, an AV access could be placed this admission. He will also need conversion of shiley to tunneled cath by IR prior to discharge  (3)Hyperphosphatemia - at least partly responsible for his pruritus - should improve with HD + Phoslo  (4)Anemia - indicated for IV iron + Retacrit with HD  (5)Pain - NSAIDs no longer contraindicated now that he is on dialysis  (6)Dispo - would like to get him accepted to [facility] in [place] for in-center HD for now. Eventually, we would look to try to transition him to home HD.      RECOMMEND:  (1)HD today and tomorrow as ordered; will add Retacrit + Venofer with HD to drive up H/H  (2)Phoslo as ordered  (3)IR for conversion to nontunneled cath later this week  (4)F/U with Vascular for AV access placement  (5)Pain control per primary team; no objection to NSAIDs now that patient is ESRD  (6)Efforts to have patient accepted to [facility] for outside HD      Rafita Denny MD  [facility]  Office/on call physician: [phone]  Cell (7a-7q): [phone]

## 2025-02-18 NOTE — PROGRESS NOTE ADULT - ASSESSMENT
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 He is well known to me from multiple recent admisions  s/p admissions at [facility] 12/20-12/25/24 and 2/4-2/7 with SONDRA on CKD with associated uremic symptoms.   At the last admission he had expressed strong interest to try to hold off with HD intiation but he has been getting worse clinically at home.  His SONDRA and uremic symptoms significantly improved after the first admission; they improved a to mild extent during the 2nd admission to the point where he was able to be discharged without HD. Since then, however, he has worsened further.   He has been suffering from progressively worsening diffuse pruritus, loss of appetite, and generalized weakness and falls.   His nephrologist and PCP has been speaking with him and his family over the past few days,   The initial plan was that he would present to the [facility] ER Monday 2/17 (ie tomorrow) for HD initiation. Today, however, he fell and sustained trauma to his knee. Given the fall and concern for knee fracture, he presented to the ER today. multiple xrays show no Fractures.      CKD5, uremia, requiring initiation of HD  Rash, seborrheic dermatitis  Anemia  SP catheter, chronic    plan  - DEYA boston placed. seen in HD. tolerating HD well  - plan as per renal: HD x 3 consecutive days  - HBV/HCV studies (in anticipation of HD)  -Can plan for setup of outpatient HD at [facility]) - as per nephrology will plan fro eventual efforts to transition from in-center to home HD within the ensuing months  - awaiting AVF surgery creation by vascular prior to DC. medically cleared for surgery  - was seen by cardiology. clearance reviewed   - Renal diet  - shiley to be changed to tunneled catheter prior to discharge, and plan as well for AV access at some point (either this admission or after discharge)  - states his feet hurt, R foot toes look erythematous, nontender, no edema. podiatry called  - b/l pain in feet, ongoing pruritis. tolerating HD  - Pain management: start oxy ER 15 mg bid, Oxycodone 10 IR q6H, ptn will refuse if wont need the med. DIlaudid prn severe pain 1 mg q4H prn. Lyrica 25 mg bid. add gabapentin 50 mg at hs  - cont outptn meds  - DVT ppx w HSC

## 2025-02-19 NOTE — PROGRESS NOTE ADULT - PROBLEM SELECTOR PLAN 1
Admitted for initiation of HD  s/p shiley placement 2/17  Vascular surgery consulted for HD access.  Acceptable cardiac risk to proceed with AVF.

## 2025-02-19 NOTE — PROGRESS NOTE ADULT - SUBJECTIVE AND OBJECTIVE BOX
Patient is a 74y old  Male who presents with a chief complaint of ESRD requiring HD, uremia (19 Feb 2025 12:23)      SUBJECTIVE / OVERNIGHT EVENTS: tolerating HD, pruritis much improved on ATARAX, pain wasnt controlled this am, med regimen changed, present one controlling pain. ptn has b/l LE pain 2/2  PAD, vascular Following    MEDICATIONS  (STANDING):  atorvastatin 40 milliGRAM(s) Oral at bedtime  calcium acetate 2001 milliGRAM(s) Oral three times a day with meals  chlorhexidine 4% Liquid 1 Application(s) Topical <User Schedule>  epoetin aleah-epbx (RETACRIT) Injectable 88805 Unit(s) IV Push <User Schedule>  gabapentin Solution 50 milliGRAM(s) Oral at bedtime  heparin   Injectable 5000 Unit(s) SubCutaneous every 8 hours  hydrOXYzine hydrochloride 25 milliGRAM(s) Oral four times a day  iron sucrose IVPB 100 milliGRAM(s) IV Intermittent <User Schedule>  ketoconazole 2% Shampoo 1 Application(s) Topical <User Schedule>  oxyCODONE  ER Tablet 30 milliGRAM(s) Oral every 12 hours  pantoprazole    Tablet 40 milliGRAM(s) Oral before breakfast  pregabalin 25 milliGRAM(s) Oral two times a day    MEDICATIONS  (PRN):  acetaminophen     Tablet .. 650 milliGRAM(s) Oral every 6 hours PRN Temp greater or equal to 38C (100.4F), Mild Pain (1 - 3)  benzonatate 100 milliGRAM(s) Oral every 8 hours PRN Cough  guaiFENesin ER 1200 milliGRAM(s) Oral every 12 hours PRN Cough  HYDROmorphone  Injectable 2 milliGRAM(s) IV Push every 6 hours PRN Severe Pain (7 - 10)  oxyCODONE    IR 10 milliGRAM(s) Oral every 6 hours PRN moderate breakthrough Pain  sodium chloride 0.9% lock flush 10 milliLiter(s) IV Push every 1 hour PRN Pre/post blood products, medications, blood draw, and to maintain line patency      Vital Signs Last 24 Hrs  T(F): 98.6 (02-19-25 @ 16:15), Max: 99.1 (02-19-25 @ 04:42)  HR: 94 (02-19-25 @ 16:15) (63 - 94)  BP: 113/73 (02-19-25 @ 16:15) (94/56 - 136/63)  RR: 18 (02-19-25 @ 16:15) (18 - 18)  SpO2: 95% (02-19-25 @ 16:15) (93% - 97%)  Telemetry:   CAPILLARY BLOOD GLUCOSE        I&O's Summary    18 Feb 2025 07:01  -  19 Feb 2025 07:00  --------------------------------------------------------  IN: 0 mL / OUT: 500 mL / NET: -500 mL    19 Feb 2025 07:01  -  19 Feb 2025 20:10  --------------------------------------------------------  IN: 240 mL / OUT: 400 mL / NET: -160 mL        PHYSICAL EXAM:  GENERAL: NAD, well-developed  HEAD:  Atraumatic, Normocephalic  EYES: EOMI, PERRLA, conjunctiva and sclera clear  NECK: Supple, No JVD  CHEST/LUNG: Clear to auscultation bilaterally; No wheeze  HEART: Regular rate and rhythm; No murmurs, rubs, or gallops  ABDOMEN: Soft, Nontender, Nondistended; Bowel sounds present  EXTREMITIES:  2+ Peripheral Pulses, No clubbing, cyanosis, or edema  PSYCH: AAOx3  NEUROLOGY: non-focal  SKIN: No rashes or lesions    LABS:                        9.1    6.99  )-----------( 211      ( 19 Feb 2025 06:23 )             30.1     02-19    135  |  97  |  37[H]  ----------------------------<  97  4.1   |  25  |  4.12[H]    Ca    7.8[L]      19 Feb 2025 06:23  Phos  3.9     02-19  Mg     2.1     02-19    TPro  5.6[L]  /  Alb  2.6[L]  /  TBili  0.3  /  DBili  x   /  AST  6[L]  /  ALT  6[L]  /  AlkPhos  90  02-18          Urinalysis Basic - ( 19 Feb 2025 06:23 )    Color: x / Appearance: x / SG: x / pH: x  Gluc: 97 mg/dL / Ketone: x  / Bili: x / Urobili: x   Blood: x / Protein: x / Nitrite: x   Leuk Esterase: x / RBC: x / WBC x   Sq Epi: x / Non Sq Epi: x / Bacteria: x        RADIOLOGY & ADDITIONAL TESTS:    Imaging Personally Reviewed:    Consultant(s) Notes Reviewed:      Care Discussed with Consultants/Other Providers:

## 2025-02-19 NOTE — PROGRESS NOTE ADULT - SUBJECTIVE AND OBJECTIVE BOX
Vascular Surgery Daily Progress Note    Last 24 hours events:     Subjective: Patient examined at bedside this morning.       heparin   Injectable 5000        Vital Signs Last 24 Hrs  T(C): 36.4 (19 Feb 2025 07:43), Max: 37.3 (19 Feb 2025 04:42)  T(F): 97.5 (19 Feb 2025 07:43), Max: 99.1 (19 Feb 2025 04:42)  HR: 85 (19 Feb 2025 07:43) (67 - 94)  BP: 109/52 (19 Feb 2025 07:43) (105/69 - 143/56)  BP(mean): --  RR: 18 (19 Feb 2025 07:43) (18 - 18)  SpO2: 95% (19 Feb 2025 07:43) (93% - 97%)    Parameters below as of 19 Feb 2025 07:43  Patient On (Oxygen Delivery Method): room air      I&O's Summary    18 Feb 2025 07:01  -  19 Feb 2025 07:00  --------------------------------------------------------  IN: 0 mL / OUT: 500 mL / NET: -500 mL        Physical Exam:  General: NAD, male appearing stated age. Abrasions to left face  Neuro: Awake, alert and oriented x3  Resp: Nonlabored breathing on RA  CV: Hemodynamically stable. R IJ shiley site c/d/i   GI/Abd: Soft, obese, nontender. Dressing to [facility] C/D/I. Suprapubic catheter in place  Ext: Palp femoral pulses bilat. BLE atrophic, minimal dorsi/plantarflexion bilaterally. Sensation grossly intact. LLE edematous compared to R, erythema to right toes/forefoot. No active wounds bilat.       LABS:                        9.1    6.99  )-----------( 211      ( 19 Feb 2025 06:23 )             30.1     02-19    135  |  97  |  37[H]  ----------------------------<  97  4.1   |  25  |  4.12[H]    Ca    7.8[L]      19 Feb 2025 06:23  Phos  3.9     02-19  Mg     2.1     02-19    TPro  5.6[L]  /  Alb  2.6[L]  /  TBili  0.3  /  DBili  x   /  AST  6[L]  /  ALT  6[L]  /  AlkPhos  90  02-18    PT/INR - ( 17 Feb 2025 11:37 )   PT: 14.7 sec;   INR: 1.28 ratio         PTT - ( 17 Feb 2025 11:37 )  PTT:32.3 sec

## 2025-02-19 NOTE — PROGRESS NOTE ADULT - SUBJECTIVE AND OBJECTIVE BOX
Overnight events noted      VITAL:  T(C): , Max: 37.3 (02-19-25 @ 04:42)  T(F): , Max: 99.1 (02-19-25 @ 04:42)  HR: 85 (02-19-25 @ 07:43)  BP: 109/52 (02-19-25 @ 07:43)  BP(mean): --  RR: 18 (02-19-25 @ 07:43)  SpO2: 95% (02-19-25 @ 07:43)  Wt(kg): --      PHYSICAL EXAM:  Constitutional: NAD, Alert  HEENT: NCAT, MMM  Neck: Supple, No JVD  Respiratory: CTA-b/l  Cardiovascular: RRR s1s2, no m/r/g  Gastrointestinal: BS+, soft, NT/ND  Extremities: No peripheral edema b/l  Neurological: no focal deficits; strength grossly intact  Back: no CVAT b/l  Skin: No rashes, no nevi  Access: [facility]      LABS:                        9.1    6.99  )-----------( 211      ( 19 Feb 2025 06:23 )             30.1     Na(135)/K(4.1)/Cl(97)/HCO3(25)/BUN(37)/Cr(4.12)Glu(97)/Ca(7.8)/Mg(2.1)/PO4(3.9)    02-19 @ 06:23  Na(137)/K(3.6)/Cl(98)/HCO3(23)/BUN(62)/Cr(5.33)Glu(115)/Ca(7.5)/Mg(--)/PO4(5.2)    02-18 @ 08:55  Na(132)/K(4.4)/Cl(94)/HCO3(19)/BUN(94)/Cr(7.71)Glu(100)/Ca(7.6)/Mg(2.4)/PO4(--)    02-17 @ 11:37  Na(132)/K(5.2)/Cl(95)/HCO3(17)/BUN(101)/Cr(7.76)Glu(99)/Ca(8.3)/Mg(2.6)/PO4(7.9)    02-16 @ 16:43      IMPRESSION: 74M w/ polio, neurogenic bladder/suprapubic catheter, iatrogenic 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 He will also need conversion of shiley to tunneled cath by IR prior to discharge  (3)Hyperphosphatemia - at least partly responsible for his pruritus - improving  (4)Anemia - on IV iron + Retacrit with HD  (5)Pain - NSAIDs no longer contraindicated now that he is on dialysis  (6)Dispo - would like to get him accepted to [facility] in [place] for in-center HD for now. Eventually, we would look to try to transition him to home HD.      RECOMMEND:  (1)HD today as ordered; Retacrit + Venofer with HD   (2)Phoslo as ordered  (3)IR for conversion to nontunneled cath   (4)F/U with Vascular for AV access placement  (5)Pain control per primary team; no objection to NSAIDs now that patient is ESRD  (6)Efforts to have patient accepted to [facility] for outside HD                Rafita Denny MD  [facility]  Office/on call physician: [phone]  Cell (7a-7p): [phone]       Seen on HD      VITAL:  T(C): , Max: 37.3 (02-19-25 @ 04:42)  T(F): , Max: 99.1 (02-19-25 @ 04:42)  HR: 85 (02-19-25 @ 07:43)  BP: 109/52 (02-19-25 @ 07:43)  BP(mean): --  RR: 18 (02-19-25 @ 07:43)  SpO2: 95% (02-19-25 @ 07:43)  Wt(kg): --      PHYSICAL EXAM:  Constitutional: NAD, Alert  HEENT: NCAT, MMM  Neck: Supple, No JVD  Respiratory: CTA-b/l  Cardiovascular: RRR s1s2, no m/r/g  Gastrointestinal: BS+, soft, NT/ND  Extremities: No peripheral edema b/l  Neurological: no focal deficits; strength grossly intact  Back: no CVAT b/l  Skin: No rashes, no nevi  Access: DEYA boston-accessed      LABS:                        9.1    6.99  )-----------( 211      ( 19 Feb 2025 06:23 )             30.1     Na(135)/K(4.1)/Cl(97)/HCO3(25)/BUN(37)/Cr(4.12)Glu(97)/Ca(7.8)/Mg(2.1)/PO4(3.9)    02-19 @ 06:23  Na(137)/K(3.6)/Cl(98)/HCO3(23)/BUN(62)/Cr(5.33)Glu(115)/Ca(7.5)/Mg(--)/PO4(5.2)    02-18 @ 08:55  Na(132)/K(4.4)/Cl(94)/HCO3(19)/BUN(94)/Cr(7.71)Glu(100)/Ca(7.6)/Mg(2.4)/PO4(--)    02-17 @ 11:37  Na(132)/K(5.2)/Cl(95)/HCO3(17)/BUN(101)/Cr(7.76)Glu(99)/Ca(8.3)/Mg(2.6)/PO4(7.9)    02-16 @ 16:43      IMPRESSION: 74M w/ polio, neurogenic bladder/suprapubic catheter, iatrogenic 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 He will also need conversion of shiley to tunneled cath by IR prior to discharge  (3)Hyperphosphatemia - at least partly responsible for his pruritus - improving  (4)Anemia - on IV iron + Retacrit with HD  (5)Pain - NSAIDs no longer contraindicated now that he is on dialysis  (6)Dispo - would like to get him accepted to [facility] in [place] for in-center HD for now. Eventually, we would look to try to transition him to home HD.      RECOMMEND:  (1)HD today as ordered; Retacrit + Venofer with HD   (2)Phoslo as ordered  (3)IR for conversion to nontunneled cath   (4)F/U with Vascular for AV access placement  (5)Pain control per primary team; no objection to NSAIDs now that patient is ESRD  (6)Efforts to have patient accepted to [facility] for outside HD                Rafita Denny MD  [facility] [place]  Office/on call physician: [phone]  Cell (7a-7p): [phone]       Seen on HD  complaining of persistent LE pain    VITAL:  T(C): , Max: 37.3 (02-19-25 @ 04:42)  T(F): , Max: 99.1 (02-19-25 @ 04:42)  HR: 85 (02-19-25 @ 07:43)  BP: 109/52 (02-19-25 @ 07:43)  BP(mean): --  RR: 18 (02-19-25 @ 07:43)  SpO2: 95% (02-19-25 @ 07:43)  Wt(kg): --      PHYSICAL EXAM:  Constitutional: NAD, Alert  HEENT: NCAT, MMM  Neck: Supple, No JVD  Respiratory: CTA-b/l  Cardiovascular: RRR s1s2, no m/r/g  Gastrointestinal: BS+, soft, NT/ND  Extremities: No peripheral edema b/l  Neurological: no focal deficits; strength grossly intact  Back: no CVAT b/l  Skin: No rashes, no nevi  Access: DEYA boston-accessed      LABS:                        9.1    6.99  )-----------( 211      ( 19 Feb 2025 06:23 )             30.1     Na(135)/K(4.1)/Cl(97)/HCO3(25)/BUN(37)/Cr(4.12)Glu(97)/Ca(7.8)/Mg(2.1)/PO4(3.9)    02-19 @ 06:23  Na(137)/K(3.6)/Cl(98)/HCO3(23)/BUN(62)/Cr(5.33)Glu(115)/Ca(7.5)/Mg(--)/PO4(5.2)    02-18 @ 08:55  Na(132)/K(4.4)/Cl(94)/HCO3(19)/BUN(94)/Cr(7.71)Glu(100)/Ca(7.6)/Mg(2.4)/PO4(--)    02-17 @ 11:37  Na(132)/K(5.2)/Cl(95)/HCO3(17)/BUN(101)/Cr(7.76)Glu(99)/Ca(8.3)/Mg(2.6)/PO4(7.9)    02-16 @ 16:43      IMPRESSION: 74M w/ polio, neurogenic bladder/suprapubic catheter, iatrogenic 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 He will also need conversion of shiley to tunneled cath by IR prior to discharge  (3)Hyperphosphatemia - at least partly responsible for his pruritus - improving  (4)Anemia - on IV iron + Retacrit with HD  (5)Pain - NSAIDs no longer contraindicated now that he is on dialysis  (6)Dispo - would like to get him accepted to [facility] in [place] for in-center HD for now. Eventually, we would look to try to transition him to home HD.      RECOMMEND:  (1)HD today as ordered; Retacrit + Venofer with HD   (2)Phoslo as ordered  (3)IR for conversion to nontunneled cath   (4)F/U with Vascular for AV access placement  (5)Toradol 15mg iv x 1 for LE pain  (6)Efforts to have patient accepted to [facility] for outside HD                Rafita Denny MD  [facility]  Office/on call physician: [phone]  Cell (7a-7p): [phone]

## 2025-02-19 NOTE — PROGRESS NOTE ADULT - ASSESSMENT
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 He is well known to me from multiple recent admisions  s/p admissions at [facility] 12/20-12/25/24 and 2/4-2/7 with SONDRA on CKD with associated uremic symptoms.   At the last admission he had expressed strong interest to try to hold off with HD intiation but he has been getting worse clinically at home.  His SONDRA and uremic symptoms significantly improved after the first admission; they improved a to mild extent during the 2nd admission to the point where he was able to be discharged without HD. Since then, however, he has worsened further.   He has been suffering from progressively worsening diffuse pruritus, loss of appetite, and generalized weakness and falls.   His nephrologist and PCP has been speaking with him and his family over the past few days,   The initial plan was that he would present to the [facility] ER Monday 2/17 (ie tomorrow) for HD initiation. Today, however, he fell and sustained trauma to his knee. Given the fall and concern for knee fracture, he presented to the ER today. multiple xrays show no Fractures.      CKD5, uremia, requiring initiation of HD  Rash, seborrheic dermatitis  Anemia  SP catheter, chronic    plan  - DEYA boston placed. seen in HD. tolerating HD well  - plan as per renal: HD x 3 consecutive days initially, thereafter prob tiw  - HBV/HCV studies (in anticipation of HD)  -Can plan for setup of outpatient HD at [facility] - as per nephrology will plan fro eventual efforts to transition from in-center to home HD within the ensuing months  - awaiting AVF surgery creation by vascular prior to GLENN SALAZAR. medically cleared for surgery  - was seen by cardiology. clearance reviewed   - Renal diet  - shiley to be changed to tunneled catheter prior to discharge, and plan as well for AV access at some point (either this admission or after discharge)  - states his feet hurt, R foot toes look erythematous, nontender, no edema. podiatry called. KATRIN/PVR c/w PAD. no ulcerations. Vascular following  - tolerating HD, pruritis much improved on ATARAX, pain wasnt controlled this am, med regimen changed, present one controlling pain. ptn has b/l LE pain 2/2  PAD, vascular Following  - Pain management: raised oxy ER to 30 mg bid, cont Oxycodone 10 IR q6H, ptn will refuse if wont need the med. DIlaudid prn severe pain 2 mg q4H prn. Lyrica 25 mg bid. gabapentin 50 mg at hs  - cont outptn meds  - DVT ppx w HSC

## 2025-02-19 NOTE — CHART NOTE - NSCHARTNOTEFT_GEN_A_CORE
Contacted by Dr Martinez and informed "patient is in severe pain" - directed to increase Dilaudid IV to 2mg, Increase Oxy ER to 30mg, continue IR Oxy at 10 and call pain management consult -> done as requested  IR also consulted to convert Shiley to tunnelled cath for Dialysis (until AVF placed / mature)

## 2025-02-19 NOTE — PROGRESS NOTE ADULT - SUBJECTIVE AND OBJECTIVE BOX
Subjective: Patient seen and examined. No new events except as noted.   Pt seen at HD  c/o leg pain     REVIEW OF SYSTEMS:    CONSTITUTIONAL: +weakness, fevers or chills  EYES/ENT: No visual changes;  No vertigo or throat pain   NECK: No pain or stiffness  RESPIRATORY: No cough, wheezing, hemoptysis; No shortness of breath  CARDIOVASCULAR: No chest pain or palpitations  GASTROINTESTINAL: No abdominal or epigastric pain. No nausea, vomiting, or hematemesis; No diarrhea or constipation. No melena or hematochezia.  GENITOURINARY: No dysuria, frequency or hematuria  NEUROLOGICAL: No numbness or weakness  SKIN: No itching, burning, rashes, or lesions   All other review of systems is negative unless indicated above.    MEDICATIONS:  MEDICATIONS  (STANDING):  atorvastatin 40 milliGRAM(s) Oral at bedtime  calcium acetate 2001 milliGRAM(s) Oral three times a day with meals  chlorhexidine 4% Liquid 1 Application(s) Topical <User Schedule>  epoetin aleah-epbx (RETACRIT) Injectable 31937 Unit(s) IV Push <User Schedule>  gabapentin Solution 50 milliGRAM(s) Oral at bedtime  heparin   Injectable 5000 Unit(s) SubCutaneous every 8 hours  hydrOXYzine hydrochloride 25 milliGRAM(s) Oral four times a day  iron sucrose IVPB 100 milliGRAM(s) IV Intermittent <User Schedule>  ketoconazole 2% Shampoo 1 Application(s) Topical <User Schedule>  oxyCODONE    IR 10 milliGRAM(s) Oral every 6 hours  oxyCODONE  ER Tablet 30 milliGRAM(s) Oral every 12 hours  pantoprazole    Tablet 40 milliGRAM(s) Oral before breakfast  pregabalin 25 milliGRAM(s) Oral two times a day      PHYSICAL EXAM:  T(C): 36.6 (02-19-25 @ 12:02), Max: 37.3 (02-19-25 @ 04:42)  HR: 90 (02-19-25 @ 12:02) (63 - 94)  BP: 94/56 (02-19-25 @ 12:02) (94/56 - 136/63)  RR: 18 (02-19-25 @ 12:02) (18 - 18)  SpO2: 96% (02-19-25 @ 12:02) (93% - 97%)  Wt(kg): --  I&O's Summary    18 Feb 2025 07:01  -  19 Feb 2025 07:00  --------------------------------------------------------  IN: 0 mL / OUT: 500 mL / NET: -500 mL    19 Feb 2025 07:01  -  19 Feb 2025 12:24  --------------------------------------------------------  IN: 0 mL / OUT: 400 mL / NET: -400 mL          Appearance: Normal	  HEENT:   Normal oral mucosa, PERRL, EOMI	  Lymphatic: No lymphadenopathy  Cardiovascular: Normal S1 S2, No JVD, No murmurs, No edema  Respiratory: Lungs clear to auscultation	  Psychiatry: A & O x 3, Mood & affect appropriate  Skin: No rashes, No ecchymoses, No cyanosis	  Neurologic: Non-focal  GI/Abd: Soft, obese, nontender. Dressing to [facility] C/D/I. Suprapubic catheter in place  Ext: Palp femoral pulses bilat. BLE atrophic, minimal dorsi/plantarflexion bilaterally. Sensation grossly intact. LLE edematous compared to R, erythema to right toes/forefoot. No active wounds bilat.   Vascular: Peripheral pulses palpable 2+ bilaterally        LABS:    CARDIAC MARKERS:                                9.1    6.99  )-----------( 211      ( 19 Feb 2025 06:23 )             30.1     02-19    135  |  97  |  37[H]  ----------------------------<  97  4.1   |  25  |  4.12[H]    Ca    7.8[L]      19 Feb 2025 06:23  Phos  3.9     02-19  Mg     2.1     02-19    TPro  5.6[L]  /  Alb  2.6[L]  /  TBili  0.3  /  DBili  x   /  AST  6[L]  /  ALT  6[L]  /  AlkPhos  90  02-18    proBNP:   Lipid Profile:   HgA1c:   TSH:             TELEMETRY: 	    ECG:  	  RADIOLOGY:   DIAGNOSTIC TESTING:  [ ] Echocardiogram:  [ ]  Catheterization:  [ ] Stress Test:    OTHER:

## 2025-02-19 NOTE — PROGRESS NOTE ADULT - ASSESSMENT
74M with history of HTN, polio with multiple subsequent problems including LE weakness requiring wheelchair, neurogenic bladder s/p suprapubic catheter, and colostomy s/p iatrogenic rectal injury 2/2023, and CKD 5 presenting to the ED with weakness and falls. Admitted for initiation of HD now s/p R IJ shiley placement on 2/17/25. Needs long term access per nephro     Recs:  - IR darvin placed bedside on 2/17/25  - Please preserve nondominant arm (left side, no blood draws etc.)   - Please have IR convert shiley into permacath   - vein mapping reviewed  - Medical and cardiac clearances documented for future AVF creation     Vascular   [id number]

## 2025-02-20 NOTE — PROGRESS NOTE ADULT - SUBJECTIVE AND OBJECTIVE BOX
Subjective: Patient seen and examined. No new events except as noted.   RRT called for acute mental status change overnight  Pt feeling better this am    REVIEW OF SYSTEMS:    CONSTITUTIONAL: No weakness, fevers or chills  EYES/ENT: No visual changes;  No vertigo or throat pain   NECK: No pain or stiffness  RESPIRATORY: No cough, wheezing, hemoptysis; No shortness of breath  CARDIOVASCULAR: No chest pain or palpitations  GASTROINTESTINAL: No abdominal or epigastric pain. No nausea, vomiting, or hematemesis; No diarrhea or constipation. No melena or hematochezia.  GENITOURINARY: No dysuria, frequency or hematuria  NEUROLOGICAL: No numbness or weakness  SKIN: No itching, burning, rashes, or lesions   All other review of systems is negative unless indicated above.    MEDICATIONS:  MEDICATIONS  (STANDING):  atorvastatin 40 milliGRAM(s) Oral at bedtime  calcium acetate 2001 milliGRAM(s) Oral three times a day with meals  chlorhexidine 4% Liquid 1 Application(s) Topical <User Schedule>  epoetin aleah-epbx (RETACRIT) Injectable 37369 Unit(s) IV Push <User Schedule>  heparin   Injectable 5000 Unit(s) SubCutaneous every 8 hours  hydrOXYzine hydrochloride 25 milliGRAM(s) Oral four times a day  ketoconazole 2% Shampoo 1 Application(s) Topical <User Schedule>  oxyCODONE  ER Tablet 30 milliGRAM(s) Oral every 12 hours  pantoprazole    Tablet 40 milliGRAM(s) Oral before breakfast  piperacillin/tazobactam IVPB.- 3.375 Gram(s) IV Intermittent once  pregabalin 25 milliGRAM(s) Oral two times a day      PHYSICAL EXAM:  T(C): 37.2 (02-20-25 @ 10:10), Max: 37.6 (02-20-25 @ 02:30)  HR: 95 (02-20-25 @ 10:10) (87 - 98)  BP: 119/54 (02-20-25 @ 10:10) (94/56 - 134/70)  RR: 18 (02-20-25 @ 10:10) (18 - 18)  SpO2: 99% (02-20-25 @ 10:10) (92% - 99%)  Wt(kg): --  I&O's Summary    19 Feb 2025 07:01  -  20 Feb 2025 07:00  --------------------------------------------------------  IN: 240 mL / OUT: 900 mL / NET: -660 mL    20 Feb 2025 07:01  -  20 Feb 2025 10:43  --------------------------------------------------------  IN: 200 mL / OUT: 0 mL / NET: 200 mL          Appearance: Normal	  HEENT:   Normal oral mucosa, PERRL, EOMI	  Lymphatic: No lymphadenopathy  Cardiovascular: Normal S1 S2, No JVD, No murmurs, No edema  Respiratory: Lungs clear to auscultation	  Psychiatry: A & O x 3, Mood & affect appropriate  Skin: No rashes, No ecchymoses, No cyanosis	  Neurologic: Non-focal  GI/Abd: Soft, obese, nontender. Dressing to [facility] C/D/I. Suprapubic catheter in place  Ext: Palp femoral pulses bilat. BLE atrophic, minimal dorsi/plantarflexion bilaterally. Sensation grossly intact. LLE edematous compared to R, erythema to right toes/forefoot. No active wounds bilat.   Vascular: Peripheral pulses palpable 2+ bilaterally      LABS:    CARDIAC MARKERS:                                8.4    7.99  )-----------( 204      ( 20 Feb 2025 02:05 )             28.1     02-20    123[L]  |  88[L]  |  22  ----------------------------<  117[H]  3.7   |  25  |  3.21[H]    Ca    7.5[L]      20 Feb 2025 02:05  Phos  3.1     02-20  Mg     1.8     02-20    TPro  5.5[L]  /  Alb  2.5[L]  /  TBili  0.4  /  DBili  x   /  AST  7[L]  /  ALT  <5[L]  /  AlkPhos  88  02-20    proBNP:   Lipid Profile:   HgA1c:   TSH:             TELEMETRY: 	    ECG:  	  RADIOLOGY:   DIAGNOSTIC TESTING:  [ ] Echocardiogram:  [ ]  Catheterization:  [ ] Stress Test:    OTHER:

## 2025-02-20 NOTE — PROVIDER CONTACT NOTE (OTHER) - ASSESSMENT
Pt resting comfortably in bed. Patient vitals assessed post hemo dialysis /59, HR 99, oxygen 86% on CPOX. Pt refusing oxygen use at this time. RN educated pt on incentive spirometer use using teach back method. Pt denies chest pain, SOB, headache, or dizziness at this time.

## 2025-02-20 NOTE — ADVANCED PRACTICE NURSE CONSULT - REASON FOR CONSULT
Consult to assess patient skin initiated by RN for sacrum deep tissue injury present on admission.     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 He is well known to me from multiple recent admisions  s/p admissions at [facility] 12/20-12/25/24 and 2/4-2/7 with SONDRA on CKD with associated uremic symptoms.   At the last admission he had expressed strong interest to try to hold off with HD intiation but he has been getting worse clinically at home.  His SONDRA and uremic symptoms significantly improved after the first admission; they improved a to mild extent during the 2nd admission to the point where he was able to be discharged without HD. Since then, however, he has worsened further.   He has been suffering from progressively worsening diffuse pruritus, loss of appetite, and generalized weakness and falls.   His nephrologist and PCP has been speaking with him and his family over the past few days,   The initial plan was that he would present to the [facility] ER Monday 2/17 (ie tomorrow) for HD initiation. Today, however, he fell and sustained trauma to his knee. Given the fall and concern for knee fracture, he presented to the ER today. multiple xrays show no Fractures.

## 2025-02-20 NOTE — PROGRESS NOTE ADULT - SUBJECTIVE AND OBJECTIVE BOX
SURGERY DAILY PROGRESS NOTE    SUBJECTIVE: Patient seen and evaluated on AM rounds. Overnight patient rapid response due to altered mental status and tremors which improved overnight.    OBJECTIVE:  Vital Signs Last 24 Hrs  T(C): 37.2 (20 Feb 2025 10:10), Max: 37.6 (20 Feb 2025 02:30)  T(F): 98.9 (20 Feb 2025 10:10), Max: 99.7 (20 Feb 2025 02:30)  HR: 95 (20 Feb 2025 10:10) (63 - 98)  BP: 119/54 (20 Feb 2025 10:10) (94/56 - 134/70)  BP(mean): --  RR: 18 (20 Feb 2025 10:10) (18 - 18)  SpO2: 99% (20 Feb 2025 10:10) (92% - 99%)    Parameters below as of 20 Feb 2025 10:10  Patient On (Oxygen Delivery Method): nasal cannula  O2 Flow (L/min): 2    I&O's Detail    19 Feb 2025 07:01  -  20 Feb 2025 07:00  --------------------------------------------------------  IN:    Oral Fluid: 240 mL  Total IN: 240 mL    OUT:    Indwelling Catheter - Suprapubic (mL): 500 mL    Other (mL): 400 mL  Total OUT: 900 mL    Total NET: -660 mL      20 Feb 2025 07:01  -  20 Feb 2025 10:28  --------------------------------------------------------  IN:    Oral Fluid: 200 mL  Total IN: 200 mL    OUT:  Total OUT: 0 mL    Total NET: 200 mL    Daily   MEDICATIONS  (STANDING):  atorvastatin 40 milliGRAM(s) Oral at bedtime  calcium acetate 2001 milliGRAM(s) Oral three times a day with meals  chlorhexidine 4% Liquid 1 Application(s) Topical <User Schedule>  epoetin aleah-epbx (RETACRIT) Injectable 10184 Unit(s) IV Push <User Schedule>  heparin   Injectable 5000 Unit(s) SubCutaneous every 8 hours  hydrOXYzine hydrochloride 25 milliGRAM(s) Oral four times a day  ketoconazole 2% Shampoo 1 Application(s) Topical <User Schedule>  oxyCODONE  ER Tablet 30 milliGRAM(s) Oral every 12 hours  pantoprazole    Tablet 40 milliGRAM(s) Oral before breakfast  piperacillin/tazobactam IVPB.- 3.375 Gram(s) IV Intermittent once  pregabalin 25 milliGRAM(s) Oral two times a day    MEDICATIONS  (PRN):  acetaminophen     Tablet .. 650 milliGRAM(s) Oral every 6 hours PRN Temp greater or equal to 38C (100.4F), Mild Pain (1 - 3)  benzonatate 100 milliGRAM(s) Oral every 8 hours PRN Cough  guaiFENesin ER 1200 milliGRAM(s) Oral every 12 hours PRN Cough  HYDROmorphone  Injectable 2 milliGRAM(s) IV Push every 6 hours PRN Severe Pain (7 - 10)  oxyCODONE    IR 10 milliGRAM(s) Oral every 6 hours PRN moderate breakthrough Pain  sodium chloride 0.9% lock flush 10 milliLiter(s) IV Push every 1 hour PRN Pre/post blood products, medications, blood draw, and to maintain line patency    LABS:                        8.4    7.99  )-----------( 204      ( 20 Feb 2025 02:05 )             28.1     02-20    123[L]  |  88[L]  |  22  ----------------------------<  117[H]  3.7   |  25  |  3.21[H]    Ca    7.5[L]      20 Feb 2025 02:05  Phos  3.1     02-20  Mg     1.8     02-20    TPro  5.5[L]  /  Alb  2.5[L]  /  TBili  0.4  /  DBili  x   /  AST  7[L]  /  ALT  <5[L]  /  AlkPhos  88  02-20    PT/INR - ( 20 Feb 2025 02:05 )   PT: 13.8 sec;   INR: 1.20 ratio         PTT - ( 20 Feb 2025 02:05 )  PTT:31.1 sec  Urinalysis Basic - ( 20 Feb 2025 02:05 )    Color: x / Appearance: x / SG: x / pH: x  Gluc: 117 mg/dL / Ketone: x  / Bili: x / Urobili: x   Blood: x / Protein: x / Nitrite: x   Leuk Esterase: x / RBC: x / WBC x   Sq Epi: x / Non Sq Epi: x / Bacteria: x    Physical Exam:  General: NAD  Neuro: Awake, alert and oriented x3  Resp: Nonlabored breathing on RA  CV: Hemodynamically stable. R IJ shiley site c/d/i   GI/Abd: Soft, obese, nontender. Dressing to Q C/D/I. Suprapubic catheter in place  Ext: Palp femoral pulses bilat. BLE atrophic, minimal dorsi/plantarflexion bilaterally. Sensation grossly intact. LLE edematous compared to R, erythema to right toes/forefoot. No active wounds bilat.

## 2025-02-20 NOTE — PROGRESS NOTE ADULT - SUBJECTIVE AND OBJECTIVE BOX
Located within [facility]  Infectious Disease  Dr Davis, Dr Eden, Dr Bailey, HAYDEN Zheng Julio César  [phone]  after hours and weekends [phone]    Name: BRIAN DEMPSEY  Age: 74y  Gender: Male  MRN: [id number]    Interval History--  Notes reviewed- rrt for tremors and altered mental status  pt states woke up middle of night did not know where he was and very confused, tremors were worse  this morning wife noted increased tremors and confusion -- rrt called  states itching is better  but feels worse  no cough no sob no diarrhea        Allergies    No Known Allergies    Intolerances        Medications--  Antibiotics:  piperacillin/tazobactam IVPB.- 3.375 Gram(s) IV Intermittent once    Immunologic:  epoetin aleah-epbx (RETACRIT) Injectable 20125 Unit(s) IV Push <User Schedule>    Other:  acetaminophen     Tablet .. PRN  atorvastatin  benzonatate PRN  calcium acetate  chlorhexidine 4% Liquid  guaiFENesin ER PRN  heparin   Injectable  HYDROmorphone  Injectable PRN  hydrOXYzine hydrochloride  ketoconazole 2% Shampoo  oxyCODONE    IR PRN  oxyCODONE  ER Tablet  pantoprazole    Tablet  pregabalin  sodium chloride 0.9% lock flush PRN      Review of Systems--  A 10-point review of systems was obtained.     Pertinent positives and negatives--  Constitutional: No fevers. No Chills. No Rigors.   Cardiovascular: No chest pain. No palpitations.  Respiratory: No shortness of breath. No cough.  Gastrointestinal: No nausea or vomiting. No diarrhea or constipation.   Psychiatric: Pleasant. Appropriate affect.    Review of systems otherwise negative except as previously noted.    Physical Examination--  Vital Signs: T(F): 98 (02-20-25 @ 13:48), Max: 99.7 (02-20-25 @ 02:30)  HR: 99 (02-20-25 @ 13:48)  BP: 110/59 (02-20-25 @ 13:48)  RR: 17 (02-20-25 @ 13:48)  SpO2: 86% (02-20-25 @ 13:48)  Wt(kg): --  General: Nontoxic-appearing Male in no acute distress.  HEENT: AT/NC.   Neck: Not rigid. No sense of mass. right neck cordis   Lungs: Clear bilaterally without rales, wheezing or rhonchi  Heart: Regular rate and rhythm. +sm  Abdomen: Bowel sounds present and normoactive. Soft.  suprapubic cath no drainage not hot not tender mild iinflammation and scabs  Extremities: No cyanosis or clubbing. No edema.   Skin: Warm. Dry. Good turgor. No rash. No vasculitic stigmata.  Psychiatric: Appropriate affect and mood for situation.         Laboratory Studies--  CBC                        8.4    7.99  )-----------( 204      ( 20 Feb 2025 02:05 )             28.1       Chemistries  02-20    123[L]  |  88[L]  |  22  ----------------------------<  117[H]  3.7   |  25  |  3.21[H]    Ca    7.5[L]      20 Feb 2025 02:05  Phos  3.1     02-20  Mg     1.8     02-20    TPro  5.5[L]  /  Alb  2.5[L]  /  TBili  0.4  /  DBili  x   /  AST  7[L]  /  ALT  <5[L]  /  AlkPhos  88  02-20      Culture Data      < from: Xray Chest 1 View- PORTABLE-Urgent (Xray Chest 1 View- PORTABLE-Urgent .) (02.17.25 @ 10:38) >    ACC: [id number] EXAM:  XR CHEST PORTABLE URGENT 1V   ORDERED BY: EL PHELPS     PROCEDURE DATE:  02/17/2025          INTERPRETATION:  EXAMINATION: XR CHEST URGENT    CLINICAL INDICATION: shiley catheter    TECHNIQUE: Single frontal, portable view of the chest was obtained.    COMPARISON: Chest x-ray 12/20/2024.    FINDINGS:  Right-sided central venous catheter terminates in the SVC. Elevated left   hemidiaphragm.  The heart is not accurately assessed in this AP projection.  No focal consolidations  There is no pneumothorax or pleural effusion.  No acute bony abnormality.    IMPRESSION:  Clear lungs.  Right-sided central venous catheter terminates in the SVC    < end of copied text >

## 2025-02-20 NOTE — PROGRESS NOTE ADULT - ASSESSMENT
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 He is well known to me from multiple recent admisions  s/p admissions at [facility] 12/20-12/25/24 and 2/4-2/7 with SONDRA on CKD with associated uremic symptoms.   At the last admission he had expressed strong interest to try to hold off with HD intiation but he has been getting worse clinically at home.  His SONDRA and uremic symptoms significantly improved after the first admission; they improved a to mild extent during the 2nd admission to the point where he was able to be discharged without HD. Since then, however, he has worsened further.   He has been suffering from progressively worsening diffuse pruritus, loss of appetite, and generalized weakness and falls.   His nephrologist and PCP has been speaking with him and his family over the past few days,   The initial plan was that he would present to the [facility] ER Monday 2/17 (ie tomorrow) for HD initiation. Today, however, he fell and sustained trauma to his knee. Given the fall and concern for knee fracture, he presented to the ER today. multiple xrays show no Fractures.      CKD5, uremia, requiring initiation of HD  Rash, seborrheic dermatitis  Anemia  SP catheter, chronic    plan  - DEYA boston placed. seen in HD. tolerating HD well  - plan as per renal: HD x 3 consecutive days initially, thereafter prob tiw  - HBV/HCV studies (in anticipation of HD)  -Can plan for setup of outpatient HD at [facility] - as per nephrology will plan fro eventual efforts to transition from in-center to home HD within the ensuing months    -  overnight ptn had RRT 2/2 AMS and tremors. no SZ, no LOC. noted to have Na 123( hyponatremia), given 500 cc NS. Gabapentin DCed. ptn had been taking gabapentin at home PTA. Pain is controlled. Pruritis resolved, will lower frequency of ATARAX to tid from qid. tolerating HD otherwise.   - awaiting CTA A/P +b/l LE run off to assess PAD. once PAD is addressed, then will schedule AVF creation surgery. ptn wants all to be done while inptn.   - MS is now at baseline, beng transferred to [address] [place] [address] to be changed to tunneled catheter prior to discharge, and plan as well for AV access at some point (either this admission or after discharge)    - Pain management: raised oxy ER to 30 mg bid, cont Oxycodone 10 IR q6H, ptn will refuse if wont need the med. DIlaudid prn severe pain 2 mg q4H prn. Lyrica 25 mg bid.   - cont outptn meds  - DVT ppx w [facility]SC

## 2025-02-20 NOTE — PROGRESS NOTE ADULT - ASSESSMENT
Assessment: 74M with history of HTN, polio with multiple subsequent problems including LE weakness requiring wheelchair, neurogenic bladder s/p suprapubic catheter, and colostomy s/p iatrogenic rectal injury 2/2023, and CKD 5 presenting to the ED with weakness and falls. Admitted for initiation of HD now s/p R IJ shiley placement on 2/17/25. Needs long term access per nephro     Plan:  - Please obtain CT Angio A/P with bilateral lower extremity runoff to assess peripheral vascular disease  - AVF creation planning   - Please preserve nondominant arm (left side, no blood draws etc.)   - Please have IR convert shiley into permacath   - Medical and cardiac clearances documented for future AVF creation     Vascular Surgery   [id number]

## 2025-02-20 NOTE — CHART NOTE - NSCHARTNOTEFT_GEN_A_CORE
MEDICINE NP    BRIAN DEMPSEY  74y Male    Patient is a 74y old  Male who presents with a chief complaint of ESRD requiring HD, uremia (19 Feb 2025 20:09)         > Event Summary:   Notified by RN, Patient with         -Vital Signs Last 24 Hrs  T(C): 37.6 (20 Feb 2025 02:30), Max: 37.6 (20 Feb 2025 02:30)  T(F): 99.7 (20 Feb 2025 02:30), Max: 99.7 (20 Feb 2025 02:30)  HR: 98 (20 Feb 2025 02:30) (63 - 98)  BP: 100/60 (20 Feb 2025 02:30) (94/56 - 115/67)  BP(mean): --  RR: 18 (20 Feb 2025 02:30) (18 - 18)  SpO2: 99% (20 Feb 2025 02:30) (93% - 99%)    Parameters below as of 20 Feb 2025 02:30  Patient On (Oxygen Delivery Method): nasal cannula              KELLY Caceres-BC  Medicine Department MEDICINE NP    BRIAN DEMPSEY  74y Male    74 year-old man with history of multiple medical issues including polio with associated neurogenic bladder/suprapubic catheter, 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  Admitted for SONDRA on CKD w/ initiation of chronic HD, nephro following (19 Feb 2025 20:09).  Now s/p RRT for AMS.            > Event Summary:   Notified by RN, Patient with tremors of his arms.    Patient seen at bedside, AOX1-2 self and surrounding, appears lethargic and speech somewhat slurred.    +Resting tremors B/L UE which patient reports history of same at home in past.     SpO2 87% on RA -placed on 2LNC   RRT Called for AMS and UE Tremors   RRT team responded to bedside and assumed care of patient   See RRT flowsheet  for further details   During RRT Patient with cough and fever T-100.5 concern for infection /uri, work-up sent and started Zosyn / Vanco / Tylenol iv.   Wife presents at bedside reports patient now at baseline mental status     Now s/p RRT   -Patient remains sleepy but easily arousable.  Concern likely iso URI vs Atarax +/- Gabapentin +/- Lyrica - Will hold for now  -Post RRT labs with Hyponatremia , rpt ABG/ Lytes  - Serum Osmo and Urine Osmo ordered.  Follow-up with Renal in AM  -RVP panel pending  -Resting Tremors of B/L UE - CTH ordered.  Consider Neuro cs in AM  -f/u CXR final report   -F/u BCX  -Will endorse to Day Provider in AM and Attending to follow    -Vital Signs Last 24 Hrs  T(C): 37.6 (20 Feb 2025 02:30), Max: 37.6 (20 Feb 2025 02:30)  T(F): 99.7 (20 Feb 2025 02:30), Max: 99.7 (20 Feb 2025 02:30)  HR: 98 (20 Feb 2025 02:30) (63 - 98)  BP: 100/60 (20 Feb 2025 02:30) (94/56 - 115/67)  RR: 18 (20 Feb 2025 02:30) (18 - 18)  SpO2: 99% (20 Feb 2025 02:30) (93% - 99%)    Parameters below as of 20 Feb 2025 02:30  Patient On (Oxygen Delivery Method): nasal cannula    02-20    123[L]  |  88[L]  |  22  ----------------------------<  117[H]  3.7   |  25  |  3.21[H]    Ca    7.5[L]      20 Feb 2025 02:05  Phos  3.1     02-20  Mg     1.8     02-20    TPro  5.5[L]  /  Alb  2.5[L]  /  TBili  0.4  /  DBili  x   /  AST  7[L]  /  ALT  <5[L]  /  AlkPhos  88  02-20      ABG - ( 20 Feb 2025 05:34 )  pH, Arterial: 7.38  pH, Blood: x     /  pCO2: 47    /  pO2: 131   / HCO3: 28    / Base Excess: 2.3   /  SaO2: 98.6        >>ADDEND: (02-20-25 @ 06:18)  -Attending notified, overnight's event above reviewed, requests to Stop Gabapentin, and start IVF NS 500ml x3 hrs.  -Will endorse to Day Provider in AM          VITALS  T(C): 36.4 (02-20-25 @ 05:48)  T(F): 97.6 (02-20-25 @ 05:48)  HR: 95 (02-20-25 @ 05:48)   BP: 134/70 (02-20-25 @ 05:48)   RR: 18 (02-20-25 @ 05:48)  SpO2: 98% (02-20-25 @ 05:48)    KELLY Caceres-BC  Medicine Department  #[id number]

## 2025-02-20 NOTE — PROGRESS NOTE ADULT - SUBJECTIVE AND OBJECTIVE BOX
Patient is a 74y old  Male who presents with a chief complaint of ESRD requiring HD, uremia (20 Feb 2025 14:07)      SUBJECTIVE / OVERNIGHT EVENTS:  overnight ptn had RRT 2/2 AMS and tremors. no SZ, no LOC. noted to have Na 123( hyponatremia), given 500 cc NS. Gabapentin DCed. ptn had been taking gabapentin at home PTA. Pain is controlled. Pruritis resolved, will lower frequency of ATARAX to tid from qid. tolerating HD otherwise. awaiting CTA A/P +b/l LE run off to assess PAD. once PAD is addressed, then will schedule AVF creation surgery. ptn wants all to be done while inptn. MS is now at baseline, beng transferred to [address] [place] [address]      MEDICATIONS  (STANDING):  atorvastatin 40 milliGRAM(s) Oral at bedtime  calcium acetate 2001 milliGRAM(s) Oral three times a day with meals  chlorhexidine 4% Liquid 1 Application(s) Topical <User Schedule>  epoetin aleah-epbx (RETACRIT) Injectable 51477 Unit(s) IV Push <User Schedule>  heparin   Injectable 5000 Unit(s) SubCutaneous every 8 hours  hydrOXYzine hydrochloride 25 milliGRAM(s) Oral four times a day  ketoconazole 2% Shampoo 1 Application(s) Topical <User Schedule>  oxyCODONE  ER Tablet 30 milliGRAM(s) Oral every 12 hours  pantoprazole    Tablet 40 milliGRAM(s) Oral before breakfast  pregabalin 25 milliGRAM(s) Oral two times a day    MEDICATIONS  (PRN):  acetaminophen     Tablet .. 650 milliGRAM(s) Oral every 6 hours PRN Temp greater or equal to 38C (100.4F), Mild Pain (1 - 3)  benzonatate 100 milliGRAM(s) Oral every 8 hours PRN Cough  guaiFENesin ER 1200 milliGRAM(s) Oral every 12 hours PRN Cough  HYDROmorphone  Injectable 2 milliGRAM(s) IV Push every 6 hours PRN Severe Pain (7 - 10)  oxyCODONE    IR 10 milliGRAM(s) Oral every 6 hours PRN moderate breakthrough Pain  sodium chloride 0.9% lock flush 10 milliLiter(s) IV Push every 1 hour PRN Pre/post blood products, medications, blood draw, and to maintain line patency      Vital Signs Last 24 Hrs  T(F): 98 (02-20-25 @ 13:48), Max: 99.7 (02-20-25 @ 02:30)  HR: 99 (02-20-25 @ 13:48) (76 - 99)  BP: 110/59 (02-20-25 @ 13:48) (100/60 - 134/70)  RR: 17 (02-20-25 @ 13:48) (17 - 18)  SpO2: 86% (02-20-25 @ 13:48) (86% - 99%)  Telemetry:   CAPILLARY BLOOD GLUCOSE      POCT Blood Glucose.: 124 mg/dL (20 Feb 2025 01:31)  POCT Blood Glucose.: 126 mg/dL (20 Feb 2025 01:21)    I&O's Summary    19 Feb 2025 07:01  -  20 Feb 2025 07:00  --------------------------------------------------------  IN: 240 mL / OUT: 900 mL / NET: -660 mL    20 Feb 2025 07:01  -  20 Feb 2025 18:00  --------------------------------------------------------  IN: 1000 mL / OUT: 900 mL / NET: 100 mL        PHYSICAL EXAM:  GENERAL: NAD, well-developed  HEAD:  Atraumatic, Normocephalic  EYES: EOMI, PERRLA, conjunctiva and sclera clear  NECK: Supple, No JVD  CHEST/LUNG: Clear to auscultation bilaterally; No wheeze  HEART: Regular rate and rhythm; No murmurs, rubs, or gallops  ABDOMEN: Soft, Nontender, Nondistended; Bowel sounds present  EXTREMITIES:  2+ Peripheral Pulses, No clubbing, cyanosis, or edema  PSYCH: AAOx3  NEUROLOGY: non-focal  SKIN: No rashes or lesions    LABS:                        8.4    7.99  )-----------( 204      ( 20 Feb 2025 02:05 )             28.1     02-20    123[L]  |  88[L]  |  22  ----------------------------<  117[H]  3.7   |  25  |  3.21[H]    Ca    7.5[L]      20 Feb 2025 02:05  Phos  3.1     02-20  Mg     1.8     02-20    TPro  5.5[L]  /  Alb  2.5[L]  /  TBili  0.4  /  DBili  x   /  AST  7[L]  /  ALT  <5[L]  /  AlkPhos  88  02-20    PT/INR - ( 20 Feb 2025 02:05 )   PT: 13.8 sec;   INR: 1.20 ratio         PTT - ( 20 Feb 2025 02:05 )  PTT:31.1 sec      Urinalysis Basic - ( 20 Feb 2025 02:05 )    Color: x / Appearance: x / SG: x / pH: x  Gluc: 117 mg/dL / Ketone: x  / Bili: x / Urobili: x   Blood: x / Protein: x / Nitrite: x   Leuk Esterase: x / RBC: x / WBC x   Sq Epi: x / Non Sq Epi: x / Bacteria: x        RADIOLOGY & ADDITIONAL TESTS:    Imaging Personally Reviewed:    Consultant(s) Notes Reviewed:      Care Discussed with Consultants/Other Providers:

## 2025-02-20 NOTE — PROGRESS NOTE ADULT - ASSESSMENT
74 year-old man with  ckd , polio , paraplegia with associated neurogenic bladder/suprapubic catheter  presented s/p fall  ckd- esrd  2/17 rij shiley placed   r/o cellulitis around suprapubic cath  2/20 rrt tremors and confusion -- pt with chronic tremors although notably worse  hyponatremia    labs sars cov2/rsv/flu neg  mrsa neg  2/20 cxr reviewed no new consolidation read p    meds   vanc X 1 2/20  zosyn 2/20--       plan  no fever, no leukocytosis  spt site not hot not tender  no drainage  mild stable inflammatory changes  no acute cellulitis  mental status at baseline  now    blood cx p  ct angio abd p  ct head p  cont empiric zosyn until blood cx back  monitor for fever and trend wbc       HD per renal   74 year-old man with  ckd , polio , paraplegia with associated neurogenic bladder/suprapubic catheter  presented s/p fall  ckd- esrd  2/17 rij shiley placed   r/o cellulitis around suprapubic cath  2/20 rrt tremors and confusion -- pt with chronic tremors although notably worse  hyponatremia    labs sars cov2/rsv/flu neg  mrsa neg  2/20 cxr reviewed no new consolidation read p    meds   vanc X 1 2/20  zosyn 2/20--       plan  no fever, no leukocytosis  spt site not hot not tender  no drainage  mild stable inflammatory changes  no acute cellulitis  mental status at baseline  now    blood cx p  ct angio abd p  ct head p  cont empiric zosyn until blood cx back  monitor for fever and trend wbc       HD per renal      pt evaluated face to face time in addition to reviewing history,  labs, microbiology and imaging.  antibiotic stewardship, local antibiogram, infection control strategies and potential transmission issues taken in to consideration at time of  treatment decision making process

## 2025-02-20 NOTE — PROGRESS NOTE ADULT - PROBLEM SELECTOR PLAN 1
Admitted for initiation of HD  s/p shiley placement 2/17  Tolerating HD  Vascular surgery consulted for HD access.  Acceptable cardiac risk to proceed with AVF creation.

## 2025-02-20 NOTE — PROGRESS NOTE ADULT - SUBJECTIVE AND OBJECTIVE BOX
Overnight events noted      VITAL:  T(C): , Max: 37.6 (02-20-25 @ 02:30)  T(F): , Max: 99.7 (02-20-25 @ 02:30)  HR: 95 (02-20-25 @ 05:48)  BP: 134/70 (02-20-25 @ 05:48)  BP(mean): --  RR: 18 (02-20-25 @ 05:48)  SpO2: 98% (02-20-25 @ 05:48)  Wt(kg): --      PHYSICAL EXAM:  Constitutional: NAD, Alert  HEENT: NCAT, MMM  Neck: Supple, No JVD  Respiratory: CTA-b/l  Cardiovascular: RRR s1s2, no m/r/g  Gastrointestinal: BS+, soft, NT/ND  Extremities: No peripheral edema b/l  Neurological: no focal deficits; strength grossly intact  Back: no CVAT b/l  Skin: No rashes, no nevi  Access: [facility]    LABS:                        8.4    7.99  )-----------( 204      ( 20 Feb 2025 02:05 )             28.1     Na(123)/K(3.7)/Cl(88)/HCO3(25)/BUN(22)/Cr(3.21)Glu(117)/Ca(7.5)/Mg(1.8)/PO4(3.1)    02-20 @ 02:05  Na(135)/K(4.1)/Cl(97)/HCO3(25)/BUN(37)/Cr(4.12)Glu(97)/Ca(7.8)/Mg(2.1)/PO4(3.9)    02-19 @ 06:23  Na(137)/K(3.6)/Cl(98)/HCO3(23)/BUN(62)/Cr(5.33)Glu(115)/Ca(7.5)/Mg(--)/PO4(5.2)    02-18 @ 08:55  Na(132)/K(4.4)/Cl(94)/HCO3(19)/BUN(94)/Cr(7.71)Glu(100)/Ca(7.6)/Mg(2.4)/PO4(--)    02-17 @ 11:37    Repeat Na 128 (2/20, 534am)  Serum osm 278        IMPRESSION: 74M w/ polio, neurogenic bladder/suprapubic catheter, 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 Is he overdrinking? This seems the most likely scenario...he was not on a fluid restriction up through this a.m...and given his ESRD, his kidneys are not able to appropriately dilute his urine in response to hyponatremia. We should dialyze him again this a.m. to normalize his serum sodium.  We should ensure he limits fluid intake to no more then 4-5cup/day.    (3)Vascular - nontunneled catheter as access for now; ideally an AV access could be placed this admission. He will also need conversion of shiley to tunneled cath by IR prior to discharge    (4)Hyperphosphatemia - at least partly responsible for his pruritus - improving    (5)Anemia - on IV iron + Retacrit with HD    (6)Pain - NSAIDs no longer contraindicated now that he is on dialysis    (7)Vasc - Vascular input appreciated - concern for significant PAD - no contraindication to CTA, now that he is HD-dependent    (8)Dispo - would like to get him accepted to [facility] in [place] for in-center HD for now. Eventually, we would look to try to transition him to home HD.      RECOMMEND:  (1)Next HD this a.m. - 3h, 145==>140 sodium modelling  (2)Will  patient regarding need to limit fluid intake to 4-5cup/day; will add 1L free water restriction to dietary orders  (3)No renal objection to CTA as suggested by Vascular  (4)Phoslo as ordered  (5)IR for conversion to nontunneled cath   (6)F/U with Vascular for AV access placement  (7)No objection to NSAIDs prn for pain  (8)Efforts to have patient accepted to [facility] for outside HD          Rafita Denny MD  [facility]  Office/on call physician: [phone]  Cell (7a-7p): [phone]       Wife at bedside  (+)intermittent hallucinations, (+)fatigue, (+)intermittent tremor  leg pain improving  pruritus improving  denies excessive fluid intake over past 24h    VITAL:  T(C): , Max: 37.6 (02-20-25 @ 02:30)  T(F): , Max: 99.7 (02-20-25 @ 02:30)  HR: 95 (02-20-25 @ 05:48)  BP: 134/70 (02-20-25 @ 05:48)  BP(mean): --  RR: 18 (02-20-25 @ 05:48)  SpO2: 98% (02-20-25 @ 05:48)  Wt(kg): --      PHYSICAL EXAM:  Constitutional: alert, mildly anxious but NAD at rest on NCO2  HEENT: NCAT, DMM  Neck: Supple, No JVD  Respiratory: CTA-b/l  Cardiovascular: RRR s1s2, no m/r/g  Gastrointestinal: BS+, soft, NT/ND  Extremities: No peripheral edema b/l  Neurological: no focal deficits; strength grossly intact  Back: no CVAT b/l  Skin: No rashes, no nevi  Access: [facility]    LABS:                        8.4    7.99  )-----------( 204      ( 20 Feb 2025 02:05 )             28.1     Na(123)/K(3.7)/Cl(88)/HCO3(25)/BUN(22)/Cr(3.21)Glu(117)/Ca(7.5)/Mg(1.8)/PO4(3.1)    02-20 @ 02:05  Na(135)/K(4.1)/Cl(97)/HCO3(25)/BUN(37)/Cr(4.12)Glu(97)/Ca(7.8)/Mg(2.1)/PO4(3.9)    02-19 @ 06:23  Na(137)/K(3.6)/Cl(98)/HCO3(23)/BUN(62)/Cr(5.33)Glu(115)/Ca(7.5)/Mg(--)/PO4(5.2)    02-18 @ 08:55  Na(132)/K(4.4)/Cl(94)/HCO3(19)/BUN(94)/Cr(7.71)Glu(100)/Ca(7.6)/Mg(2.4)/PO4(--)    02-17 @ 11:37    Repeat Na 128 (2/20, 534am)  Serum osm 278        IMPRESSION: 74M w/ polio, neurogenic bladder/suprapubic catheter, 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 Is he overdrinking? This seems the most likely scenario...he was not on a fluid restriction up through this a.m...and given his ESRD, his kidneys are not able to appropriately dilute his urine in response to hyponatremia. We should dialyze him again this a.m. to normalize his serum sodium.  We should ensure he limits fluid intake to no more then 4-5cup/day.    (3)Vascular - nontunneled catheter as access for now; ideally an AV access could be placed this admission. He will also need conversion of shiley to tunneled cath by IR prior to discharge    (4)Hyperphosphatemia - at least partly responsible for his pruritus - improving. Of note, he has diet coke at his bedside - best that he not drink cola/dark sodas as they are high in phosphorus    (5)Anemia - on IV iron + Retacrit with HD    (6)Pain - NSAIDs no longer contraindicated now that he is on dialysis    (7)Vasc - Vascular input appreciated - concern for significant PAD - no contraindication to CTA, now that he is HD-dependent    (8)Dispo - would like to get him accepted to [facility] in [place] for in-center HD for now. Eventually, we would look to try to transition him to home HD.      RECOMMEND:  (1)Next HD this a.m. - 3h, 145==>140 sodium modelling  (2)Fluid limitation to 4-5cups/day (d/w'd patient); will add 1L free water restriction to dietary orders  (3)No renal objection to CTA as suggested by Vascular  (4)Phoslo as ordered  (5)Counseled to avoid tutu/dark sodas  (6)Dietician consult please to further educate regarding ESRD diet  (7)IR for conversion to nontunneled cath   (8)F/U with Vascular for AV access placement  (9)No objection to NSAIDs prn for pain  (10)Efforts to have patient accepted to [facility] for outside HD          Rafita Denny MD  [facility]  Office/on call physician: [phone]  Cell (7a-7p): [phone]

## 2025-02-20 NOTE — ADVANCED PRACTICE NURSE CONSULT - ASSESSMENT
" patient stated don't need anyone to look at my skin" educated patient on request for nursing possible  pressure injury , patient declined consult.  educated patient consult will remain available and wound care can come back to assess him if he changes his mind.    patient  was educated  on the importance  for turning  and positioning  every 2 hours. The use of waffle cushion when out of bed  to chair,  and to shift hisWeight every 2 hours while in chair. The importance a keeping his skin clean and dry and  to offload feet/heels ,  and optimal nutrition.

## 2025-02-21 NOTE — DISCHARGE NOTE PROVIDER - PROVIDER TOKENS
FREE:[LAST:[korin],FIRST:[anahi],PHONE:[phone]],FAX:[phone]],ADDRESS:[[address] [place] [address]  [address] [address]],FOLLOWUP:[1 week]] PROVIDER:[TOKEN:[[id number]],FOLLOWUP:[2 weeks]],FREE:[LAST:[shamaevandana-gerry],FIRST:[anahi],PHONE:[phone]],FAX:[phone]],ADDRESS:[[address] [place] [address] [place] [address]],FOLLOWUP:[1 week]],PROVIDER:[TOKEN:[[id number]],FOLLOWUP:[1 week]]

## 2025-02-21 NOTE — PROGRESS NOTE ADULT - ASSESSMENT
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 He is well known to me from multiple recent admisions  s/p admissions at [facility] 12/20-12/25/24 and 2/4-2/7 with SONDRA on CKD with associated uremic symptoms.   At the last admission he had expressed strong interest to try to hold off with HD intiation but he has been getting worse clinically at home.  His SONDRA and uremic symptoms significantly improved after the first admission; they improved a to mild extent during the 2nd admission to the point where he was able to be discharged without HD. Since then, however, he has worsened further.   He has been suffering from progressively worsening diffuse pruritus, loss of appetite, and generalized weakness and falls.   His nephrologist and PCP has been speaking with him and his family over the past few days,   The initial plan was that he would present to the [facility] ER Monday 2/17 (ie tomorrow) for HD initiation. Today, however, he fell and sustained trauma to his knee. Given the fall and concern for knee fracture, he presented to the ER today. multiple xrays show no Fractures.      CKD5, uremia, requiring initiation of HD  Rash, seborrheic dermatitis  Anemia  PAD  SP catheter, chronic    plan  - HD via [facility]   -  2/21: ptn is tearful, a bit confused, coughing, recognizes me and family at bedside. GOC d/w daughter and wife. AMS prob delirium 2/2 acute illness +/- opiods, lyrica, atarac. will lower atarak to tid, dc lyrica, cont Oxy 2/2 ptn has severe LE pain. neuro called for eval. Head ct done yesterday w no acute findings. CTA A/P w LE run fof: severe PAD, vascular to follow up on plan fo care. plan for HD tomorrow and next week place on tiw schedule of MWF. will need tunneled catheter prior to DC and will d/w vascular scheduling of AVF. ptn wants all to be done while inptn. daughter wants to be HCP as per ptn's wishes. she was given paperwork to get it signed and witnessed and will present to nursing thereafter. details of findings and complaints and plan of care d/w daughter and wife. spent 60 min. RVP ordered. start mucinex , tessalon perles, duonebs, get CT chest to r/o PNA. pulm called    -  2/20:  ptn had RRT 2/2 AMS and tremors. no SZ, no LOC. noted to have Na 123( hyponatremia), given 500 cc NS. Gabapentin DCed. ptn had been taking gabapentin at home PTA. Pain is controlled. Pruritis resolved, tolerating HD otherwise.     - Pain management: oxy ER to 30 mg bid, cont Oxycodone 10 IR q6H,  DIlaudid prn severe pain 2 mg q4H prn.   - cont outptn meds  - DVT ppx w HSC

## 2025-02-21 NOTE — PROGRESS NOTE ADULT - SUBJECTIVE AND OBJECTIVE BOX
Subjective: Patient seen and examined. No new events except as noted.     REVIEW OF SYSTEMS:    CONSTITUTIONAL:+ weakness, fevers or chills  EYES/ENT: No visual changes;  No vertigo or throat pain   NECK: No pain or stiffness  RESPIRATORY: No cough, wheezing, hemoptysis; No shortness of breath  CARDIOVASCULAR: No chest pain or palpitations  GASTROINTESTINAL: No abdominal or epigastric pain. No nausea, vomiting, or hematemesis; No diarrhea or constipation. No melena or hematochezia.  GENITOURINARY: No dysuria, frequency or hematuria  NEUROLOGICAL: No numbness or weakness  SKIN: No itching, burning, rashes, or lesions   All other review of systems is negative unless indicated above.    MEDICATIONS:  MEDICATIONS  (STANDING):  atorvastatin 40 milliGRAM(s) Oral at bedtime  calcium acetate 2001 milliGRAM(s) Oral three times a day with meals  chlorhexidine 4% Liquid 1 Application(s) Topical <User Schedule>  epoetin aleah-epbx (RETACRIT) Injectable 05242 Unit(s) IV Push <User Schedule>  heparin   Injectable 5000 Unit(s) SubCutaneous every 8 hours  hydrOXYzine hydrochloride 25 milliGRAM(s) Oral four times a day  ketoconazole 2% Shampoo 1 Application(s) Topical <User Schedule>  oxyCODONE  ER Tablet 30 milliGRAM(s) Oral every 12 hours  pantoprazole    Tablet 40 milliGRAM(s) Oral before breakfast  piperacillin/tazobactam IVPB.. 3.375 Gram(s) IV Intermittent every 12 hours  pregabalin 25 milliGRAM(s) Oral two times a day      PHYSICAL EXAM:  T(C): 36.9 (02-21-25 @ 08:38), Max: 37.9 (02-21-25 @ 00:43)  HR: 93 (02-21-25 @ 08:38) (76 - 99)  BP: 90/63 (02-21-25 @ 08:38) (90/63 - 135/67)  RR: 18 (02-21-25 @ 08:38) (17 - 18)  SpO2: 93% (02-21-25 @ 08:38) (86% - 99%)  Wt(kg): --  I&O's Summary    20 Feb 2025 07:01  -  21 Feb 2025 07:00  --------------------------------------------------------  IN: 1000 mL / OUT: 900 mL / NET: 100 mL    21 Feb 2025 07:01  -  21 Feb 2025 10:03  --------------------------------------------------------  IN: 0 mL / OUT: 295 mL / NET: -295 mL          Appearance: Normal	  HEENT:   Normal oral mucosa, PERRL, EOMI	  Lymphatic: No lymphadenopathy  Cardiovascular: Normal S1 S2, No JVD, No murmurs, No edema  Respiratory: Lungs clear to auscultation	  Psychiatry: A & O x 3, Mood & affect appropriate  Skin: No rashes, No ecchymoses, No cyanosis	  Neurologic: Non-focal  GI/Abd: Soft, obese, nontender. Dressing to [facility] C/D/I. Suprapubic catheter in place  Ext: Palp femoral pulses bilat. BLE atrophic, minimal dorsi/plantarflexion bilaterally. Sensation grossly intact. LLE edematous compared to R, erythema to right toes/forefoot. No active wounds bilat.   Vascular: Peripheral pulses palpable 2+ bilaterally      LABS:    CARDIAC MARKERS:                                8.7    8.36  )-----------( 214      ( 21 Feb 2025 07:17 )             29.5     02-21    133[L]  |  94[L]  |  17  ----------------------------<  91  3.8   |  25  |  3.39[H]    Ca    7.9[L]      21 Feb 2025 07:16  Phos  3.1     02-20  Mg     1.8     02-20    TPro  5.6[L]  /  Alb  2.4[L]  /  TBili  0.4  /  DBili  x   /  AST  8[L]  /  ALT  <5[L]  /  AlkPhos  88  02-21      TELEMETRY: 	    ECG:  	  RADIOLOGY:   DIAGNOSTIC TESTING:  [ ] Echocardiogram:  [ ]  Catheterization:  [ ] Stress Test:    OTHER:

## 2025-02-21 NOTE — DISCHARGE NOTE PROVIDER - NSDCMRMEDTOKEN_GEN_ALL_CORE_FT
acetaminophen 325 mg oral tablet: 2 tab(s) orally every 6 hours As needed Temp greater or equal to 38C (100.4F), Mild Pain (1 - 3)  atorvastatin 40 mg oral tablet: 1 tab(s) orally once a day (at bedtime)  calcium acetate 667 mg oral capsule: 3 cap(s) orally 3 times a day  Lokelma 5 g oral powder for reconstitution: 2 packet(s) orally once a day  sodium bicarbonate 650 mg oral tablet: 3 tab(s) orally 3 times a day

## 2025-02-21 NOTE — PROGRESS NOTE ADULT - SUBJECTIVE AND OBJECTIVE BOX
Overnight events noted      VITAL:  T(C): , Max: 37.9 (02-21-25 @ 00:43)  T(F): , Max: 100.3 (02-21-25 @ 00:43)  HR: 93 (02-21-25 @ 08:38)  BP: 90/63 (02-21-25 @ 08:38)  BP(mean): --  RR: 18 (02-21-25 @ 08:38)  SpO2: 93% (02-21-25 @ 08:38)  Wt(kg): --      PHYSICAL EXAM:  Constitutional: alert, mildly anxious but NAD at rest on NCO2  HEENT: NCAT, DMM  Neck: Supple, No JVD  Respiratory: CTA-b/l  Cardiovascular: RRR s1s2, no m/r/g  Gastrointestinal: BS+, soft, NT/ND  Extremities: No peripheral edema b/l  Neurological: no focal deficits; strength grossly intact  Back: no CVAT b/l  Skin: No rashes, no nevi  Access: [facility] ([place], [place])      LABS:                        8.7    8.36  )-----------( 214      ( 21 Feb 2025 07:17 )             29.5     Na(133)/K(3.8)/Cl(94)/HCO3(25)/BUN(17)/Cr(3.39)Glu(91)/Ca(7.9)/Mg(--)/PO4(--)    02-21 @ 07:16  Na(123)/K(3.7)/Cl(88)/HCO3(25)/BUN(22)/Cr(3.21)Glu(117)/Ca(7.5)/Mg(1.8)/PO4(3.1)    02-20 @ 02:05  Na(135)/K(4.1)/Cl(97)/HCO3(25)/BUN(37)/Cr(4.12)Glu(97)/Ca(7.8)/Mg(2.1)/PO4(3.9)    02-19 @ 06:23        IMAGING:    < from: CT Angio Abd Aorta w/run-off w/ IV Cont (02.20.25 @ 18:52) >  Occlusion of the bilateral external iliac arteries as well as the   bilateral superficial femoral arteries with distal reconstitution at the   popliteal arteries. Patent three-vessel runoff of the right lower   extremity with 2 vessel runoff of the left lower extremity with occlusion   of the left mid anterior tibial artery with distal reconstitution.        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 He will also need conversion of shiley to tunneled cath by IR prior to discharge    (4)Hyperphosphatemia - improved, now that he is on HD and on Phoslo    (5)Anemia - s/p IV iron; on Retacrit with HD    (6)ID - low-grade fever - unclear etiology - ID on board - on Zosyn/Vanco IV     (6)Neuro - unclear exact etiology - infectious illness? Opiate-induced?    (7)PAD - significant disease on CTA - Vascular on board    (8)Dispo - would like to get him accepted to [facility] in [place] for in-center HD for now. Eventually, we would look to try to transition him to home HD.      RECOMMEND:  (1)No HD today; next HD tomorrow with high-Na+ bath; Retacrit with HD; no net UF  (2)Neuro evaluation  (3)Abx per primary team/ID, dose for GFR<10/HD  (4)Vascular f/u regarding PAD management as well as eventual AV access  (5)Eventual tunneled cath with IR once infectious illness/mental status improved  (6)Efforts to have patient accepted to [facility] for outside HD          Rafita Denny MD  [facility]  Office/on call physician: [phone]  Cell (7a-7p): [phone]       Wife at bedside  Patient intermittently complains of severe LLE pain  Admits to fatigue and hallucinations - has difficulty characterizing the hallucinations        VITAL:  T(C): , Max: 37.9 (02-21-25 @ 00:43)  T(F): , Max: 100.3 (02-21-25 @ 00:43)  HR: 93 (02-21-25 @ 08:38)  BP: 90/63 (02-21-25 @ 08:38)  BP(mean): --  RR: 18 (02-21-25 @ 08:38)  SpO2: 93% (02-21-25 @ 08:38)  Wt(kg): --      PHYSICAL EXAM:  Constitutional: alert, mildly anxious but NAD at rest on NCO2  HEENT: NCAT, DMM  Neck: Supple, No JVD  Respiratory: CTA-b/l  Cardiovascular: RRR s1s2, no m/r/g  Gastrointestinal: BS+, soft, NT/ND  Extremities: No peripheral edema b/l  Neurological: no focal deficits; strength grossly intact  Back: no CVAT b/l  Skin: No rashes, no nevi  Access: [facility]      LABS:                        8.7    8.36  )-----------( 214      ( 21 Feb 2025 07:17 )             29.5     Na(133)/K(3.8)/Cl(94)/HCO3(25)/BUN(17)/Cr(3.39)Glu(91)/Ca(7.9)/Mg(--)/PO4(--)    02-21 @ 07:16  Na(123)/K(3.7)/Cl(88)/HCO3(25)/BUN(22)/Cr(3.21)Glu(117)/Ca(7.5)/Mg(1.8)/PO4(3.1)    02-20 @ 02:05  Na(135)/K(4.1)/Cl(97)/HCO3(25)/BUN(37)/Cr(4.12)Glu(97)/Ca(7.8)/Mg(2.1)/PO4(3.9)    02-19 @ 06:23        IMAGING:    < from: CT Angio Abd Aorta w/run-off w/ IV Cont (02.20.25 @ 18:52) >  Occlusion of the bilateral external iliac arteries as well as the   bilateral superficial femoral arteries with distal reconstitution at the   popliteal arteries. Patent three-vessel runoff of the right lower   extremity with 2 vessel runoff of the left lower extremity with occlusion   of the left mid anterior tibial artery with distal reconstitution.        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 He will also need conversion of shiley to tunneled cath by IR prior to discharge    (4)Hyperphosphatemia - improved, now that he is on HD and on Phoslo    (5)Anemia - s/p IV iron; on Retacrit with HD    (6)ID - low-grade fever - unclear etiology - ID on board - on Zosyn/Vanco IV     (6)Neuro - unclear exact etiology - infectious illness? Opiate-induced?    (7)PAD - significant disease on CTA - Vascular on board    (8)Dispo - would like to get him accepted to [facility] in [place] for in-center HD for now. Eventually, we would look to try to transition him to home HD.      RECOMMEND:  (1)No HD today; next HD tomorrow with high-Na+ bath; Retacrit with HD; no net UF  (2)Neuro evaluation  (3)Abx per primary team/ID, dose for GFR<10/HD  (4)Vascular f/u regarding PAD management as well as eventual AV access  (5)Eventual tunneled cath with IR once infectious illness/mental status improved  (6)Efforts to have patient accepted to [facility] for outside HD      counseled patient, wife, and daughter Cori (by phone) extensively, answered all questions as able.    Rafita Denny MD  [facility]  Office/on call physician: [phone]  Cell (7a-7p): [phone]

## 2025-02-21 NOTE — CONSULT NOTE ADULT - SUBJECTIVE AND OBJECTIVE BOX
Neurology Consult    Reason for Consult: Patient is a 74y old  Male who presents with a chief complaint of ESRD requiring HD, uremia (21 Feb 2025 12:48)      HPI:  74 year-old man with history of multiple medical issues including polio with associated neurogenic bladder/suprapubic catheter, iatrogenic rectal rupture requiring colostomy 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 He is well known to me from multiple recent admisions  s/p admissions at [facility] 12/20-12/25/24 and 2/4-2/7 with SONDRA on CKD with associated uremic symptoms.   At the last admission he had expressed strong interest to try to hold off with HD intiation but he has been getting worse clinically at home.  His SONDRA and uremic symptoms significantly improved after the first admission; they improved a to mild extent during the 2nd admission to the point where he was able to be discharged without HD. Since then, however, he has worsened further.   He has been suffering from progressively worsening diffuse pruritus, loss of appetite, and generalized weakness and falls.   His nephrologist and PCP has been speaking with him and his family over the past few days,   The initial plan was that he would present to the [facility] ER Monday 2/17 (ie tomorrow) for HD initiation. Today, however, he fell and sustained trauma to his knee. Given the fall and concern for knee fracture, he presented to the ER today. multiple xrays show no Fractures. (16 Feb 2025 19:41)       PAST MEDICAL & SURGICAL HISTORY:  Polio      Hypertension      H/O urinary retention  suprapubic tube      HLD (hyperlipidemia)      BPH with obstruction/lower urinary tract symptoms      Erectile dysfunction      Bladder outlet obstruction      Presence of suprapubic catheter      Colostomy status      DVT, lower extremity      H/O cardiac murmur      S/P colostomy  2/2024      Suprapubic catheter      S/P ORIF (open reduction internal fixation) fracture  b/l legs      S/P cataract surgery  right      H/O shoulder surgery          Allergies: Allergies    No Known Allergies    Intolerances        Social History: Denies toxic habits including tobacco, ETOH or illicit drugs.    Family History: FAMILY HISTORY:  FH: type 2 diabetes    . No family history of strokes    Medications: MEDICATIONS  (STANDING):  atorvastatin 40 milliGRAM(s) Oral at bedtime  calcium acetate 2001 milliGRAM(s) Oral three times a day with meals  chlorhexidine 4% Liquid 1 Application(s) Topical <User Schedule>  epoetin aleah-epbx (RETACRIT) Injectable 43273 Unit(s) IV Push <User Schedule>  heparin   Injectable 5000 Unit(s) SubCutaneous every 8 hours  hydrOXYzine hydrochloride 25 milliGRAM(s) Oral four times a day  ketoconazole 2% Shampoo 1 Application(s) Topical <User Schedule>  oxyCODONE  ER Tablet 30 milliGRAM(s) Oral every 12 hours  pantoprazole    Tablet 40 milliGRAM(s) Oral before breakfast  piperacillin/tazobactam IVPB.. 3.375 Gram(s) IV Intermittent every 12 hours  pregabalin 25 milliGRAM(s) Oral two times a day    MEDICATIONS  (PRN):  acetaminophen     Tablet .. 650 milliGRAM(s) Oral every 6 hours PRN Temp greater or equal to 38C (100.4F), Mild Pain (1 - 3)  benzonatate 100 milliGRAM(s) Oral every 8 hours PRN Cough  guaiFENesin ER 1200 milliGRAM(s) Oral every 12 hours PRN Cough  HYDROmorphone  Injectable 2 milliGRAM(s) IV Push every 6 hours PRN Severe Pain (7 - 10)  oxyCODONE    IR 10 milliGRAM(s) Oral every 6 hours PRN moderate breakthrough Pain  sodium chloride 0.9% lock flush 10 milliLiter(s) IV Push every 1 hour PRN Pre/post blood products, medications, blood draw, and to maintain line patency      Review of Systems:  CONSTITUTIONAL:  No weight loss, fever, chills, weakness or fatigue.  HEENT:  Eyes:  No visual loss, blurred vision, double vision or yellow sclera. Ears, Nose, Throat:  No hearing loss, sneezing, congestion, runny nose or sore throat.  SKIN:  No rash or itching.  CARDIOVASCULAR:  No chest pain, chest pressure or chest discomfort. No palpitations or edema.  RESPIRATORY:  No shortness of breath, cough or sputum.  GASTROINTESTINAL:  No anorexia, nausea, vomiting or diarrhea. No abdominal pain or blood.  GENITOURINARY:  No burning on urination or incontinence   NEUROLOGICAL:  No headache, dizziness, syncope, paralysis, ataxia, numbness or tingling in the extremities. No change in bowel or bladder control. no limb weakness. no vision changes.   MUSCULOSKELETAL:  No muscle, back pain, joint pain or stiffness.  HEMATOLOGIC:  No anemia, bleeding or bruising.  LYMPHATICS:  No enlarged nodes. No history of splenectomy.  PSYCHIATRIC:  No history of depression or anxiety.  ENDOCRINOLOGIC:  No reports of sweating, cold or heat intolerance. No polyuria or polydipsia.      Vitals:  Vital Signs Last 24 Hrs  T(C): 36.9 (21 Feb 2025 08:38), Max: 37.9 (21 Feb 2025 00:43)  T(F): 98.5 (21 Feb 2025 08:38), Max: 100.3 (21 Feb 2025 00:43)  HR: 93 (21 Feb 2025 08:38) (90 - 93)  BP: 90/63 (21 Feb 2025 08:38) (90/63 - 135/67)  BP(mean): --  RR: 18 (21 Feb 2025 08:38) (18 - 18)  SpO2: 93% (21 Feb 2025 08:38) (91% - 93%)    Parameters below as of 21 Feb 2025 00:43  Patient On (Oxygen Delivery Method): room air        General Exam:   General Appearance: Appropriately dressed and in no acute distress       Head: Normocephalic, atraumatic and no dysmorphic features  Ear, Nose, and Throat: Moist mucous membranes  CVS: S1S2+  Resp: No SOB, no wheeze or rhonchi  GI: soft NT/ND  Extremities: LE PVD   Skin: No bruises or rashes     Neurological Exam:  Mental Status: Awake, alert and oriented x 2.  Able to follow simple and complex verbal commands. Able to name and repeat. fluent speech. No obvious aphasia or dysarthria noted.   Cranial Nerves: PERRL, EOMI, VFFC, sensation V1-V3 intact,  no obvious facial asymmetry, equal elevation of palate, scm/trap 5/5, tongue is midline on protrusion. no obvious papilledema on fundoscopic exam. hearing is grossly intact.   Motor: Normal bulk, tone and strength throughout uppers 4/ lowers 0-/1 5   Sensation: Intact to light touch and pinprick throughout.     Coordination: No dysmetria on FNF   Gait: deferred, wheelchair bound     Data/Labs/Imaging which I personally reviewed.     Labs:     CBC Full  -  ( 21 Feb 2025 07:17 )  WBC Count : 8.36 K/uL  RBC Count : 3.22 M/uL  Hemoglobin : 8.7 g/dL  Hematocrit : 29.5 %  Platelet Count - Automated : 214 K/uL  Mean Cell Volume : 91.6 fl  Mean Cell Hemoglobin : 27.0 pg  Mean Cell Hemoglobin Concentration : 29.5 g/dL  Auto Neutrophil # : x  Auto Lymphocyte # : x  Auto Monocyte # : x  Auto Eosinophil # : x  Auto Basophil # : x  Auto Neutrophil % : x  Auto Lymphocyte % : x  Auto Monocyte % : x  Auto Eosinophil % : x  Auto Basophil % : x    02-21    133[L]  |  94[L]  |  17  ----------------------------<  91  3.8   |  25  |  3.39[H]    Ca    7.9[L]      21 Feb 2025 07:16  Phos  3.1     02-20  Mg     1.8     02-20    TPro  5.6[L]  /  Alb  2.4[L]  /  TBili  0.4  /  DBili  x   /  AST  8[L]  /  ALT  <5[L]  /  AlkPhos  88  02-21    LIVER FUNCTIONS - ( 21 Feb 2025 07:16 )  Alb: 2.4 g/dL / Pro: 5.6 g/dL / ALK PHOS: 88 U/L / ALT: <5 U/L / AST: 8 U/L / GGT: x           PT/INR - ( 20 Feb 2025 02:05 )   PT: 13.8 sec;   INR: 1.20 ratio         PTT - ( 20 Feb 2025 02:05 )  PTT:31.1 sec  Urinalysis Basic - ( 21 Feb 2025 07:16 )    Color: x / Appearance: x / SG: x / pH: x  Gluc: 91 mg/dL / Ketone: x  / Bili: x / Urobili: x   Blood: x / Protein: x / Nitrite: x   Leuk Esterase: x / RBC: x / WBC x   Sq Epi: x / Non Sq Epi: x / Bacteria: x      < from: CT Head No Cont (02.20.25 @ 18:47) >    ACC: [id number] EXAM:  CT BRAIN   ORDERED BY: GUY DE LA CRUZ     PROCEDURE DATE:  02/20/2025          INTERPRETATION:  CLINICAL INDICATION: Altered mental status    TECHNIQUE: Axial CT scanning of the brain was obtained from the skull   base to the vertex without the administration of intravenous contrast.   Reformatted coronal and sagittal images were subsequently obtained and   reviewed.    COMPARISON: None    FINDINGS:  There is no CT evidence of acute transcortical infarct. Age-related   involutional changes and chronic microvascular ischemic changes.    There is no hydrocephalus, mass effect, or acute intracranial hemorrhage.   No extra-axial collection. Basal cisterns are patent.    The visualized paranasal sinuses and mastoid air cells are clear.    The calvarium is intact.    IMPRESSION:  No evidence of acute transcortical infarct, acute intracranial   hemorrhage, or mass effect.    --- End of Report ---            CORTNEY REARDON MD; Attending Radiologist  This document has been electronically signed. Feb 20 2025  7:07PM    < end of copied text >

## 2025-02-21 NOTE — PROGRESS NOTE ADULT - ASSESSMENT
Patient is a 74 year old male with PMH of CKD, polio, paraplegia with associated neurogenic bladder s/p suprapubic catheter who was admitted s/p fall on 2/16.   CKD - ESRD, now s/p DEYA boston 2/17, on HD  ruled out cellulitis around suprapubic cath  2/20 s/p RRT for tremors and confusion -- pt with chronic tremors although notably worse  hyponatremia    labs sars cov2/rsv/flu neg  mrsa negative   2/20 CXR with clear lungs     s/p vancomycin x1 2/20  s/p zosyn 2/20--     overnight low grade temp 100.3F x1, pt asx, no leukocytosis  SPC site with chronic/stable inflammatory changes, no drainage - no sign of SSTI  mental status at baseline now, nontoxic appearing   CTA abd with occlusion of b/l external iliac arteries and b/l superficial femoral arteries with distal reconstitution at popliteal arteries; patent three vessel runoff of RLE with 2 vessel runoff of LLE with occlusion of L mid anterior tibial artery with distal reconstitution  CTH with no acute findings     Recommendations:   Follow Bcx - NGTD  Continue empiric zosyn until blood cx back  - if remains negative x48h, can d/c and monitor off antibiotics   monitor for fever and trend wbc   Vascular surgery following   HD per renal    Continue rest of care per primary team     Bridgette Ramirez M.D.  [facility]  Available on [employer] TEAMS - *PREFERRED*  [phone]  After 5pm on weekdays and all day on weekends - please call [phone]     Thank you for consulting us and involving us in the management of this patients case. In addition to reviewing history, imaging, documents, labs, microbiology, took into account antibiotic stewardship, local antibiogram and infection control strategies and potential transmission issues at time of treatment decision making process.

## 2025-02-21 NOTE — DISCHARGE NOTE PROVIDER - NSDCFUADDAPPT_GEN_ALL_CORE_FT
Podiatry Discharge Instructions:  Follow up: Please follow up with Dr. Mullins within 1 week of discharge from the hospital, please call [phone] at [facility] for appointment and discuss that you recently were seen in the hospital.  Weight bearing: Please weight bear as tolerated  Antibiotics: Please continue as instructed. Podiatry Discharge Instructions:  Follow up: Please follow up with Dr. Mullins within 1 week of discharge from the hospital, please call [phone] at [facility] for appointment and discuss that you recently were seen in the hospital.  Wound care: Please apply betadine to bilateral foot wounds followed by 4x4 gauze, ABD pad, and fani daily, thank you!   Weight bearing: Please weight bear as tolerated  Antibiotics: Please continue as instructed.

## 2025-02-21 NOTE — DISCHARGE NOTE PROVIDER - CARE PROVIDERS DIRECT ADDRESSES
,DirectAddress_Unknown ,[email or web address],DirectAddress_Unknown,[email or web address]

## 2025-02-21 NOTE — DISCHARGE NOTE PROVIDER - HOSPITAL COURSE
HPI:  74 year-old man with history of multiple medical issues including polio with associated neurogenic bladder/suprapubic catheter, iatrogenic rectal rupture requiring colostomy 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 He is well known to me from multiple recent admisions  s/p admissions at [facility] 12/20-12/25/24 and 2/4-2/7 with SONDRA on CKD with associated uremic symptoms.   At the last admission he had expressed strong interest to try to hold off with HD intiation but he has been getting worse clinically at home.  His SONDRA and uremic symptoms significantly improved after the first admission; they improved a to mild extent during the 2nd admission to the point where he was able to be discharged without HD. Since then, however, he has worsened further.   He has been suffering from progressively worsening diffuse pruritus, loss of appetite, and generalized weakness and falls.   His nephrologist and PCP has been speaking with him and his family over the past few days,   The initial plan was that he would present to the [facility] ER Monday 2/17 (ie tomorrow) for HD initiation. Today, however, he fell and sustained trauma to his knee. Given the fall and concern for knee fracture, he presented to the ER today. multiple xrays show no Fractures. (16 Feb 2025 19:41)    Hospital Course:      Important Medication Changes and Reason:    Active or Pending Issues Requiring Follow-up:    Advanced Directives:   [ ] Full code  [ ] DNR  [ ] Hospice    Discharge Diagnoses:         HPI:  74 year-old man with history of multiple medical issues including polio with associated neurogenic bladder/suprapubic catheter, iatrogenic rectal rupture requiring colostomy 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 He is well known to me from multiple recent admisions  s/p admissions at [facility] 12/20-12/25/24 and 2/4-2/7 with SONDRA on CKD with associated uremic symptoms.   At the last admission he had expressed strong interest to try to hold off with HD intiation but he has been getting worse clinically at home.  His SONDRA and uremic symptoms significantly improved after the first admission; they improved a to mild extent during the 2nd admission to the point where he was able to be discharged without HD. Since then, however, he has worsened further.   He has been suffering from progressively worsening diffuse pruritus, loss of appetite, and generalized weakness and falls.   His nephrologist and PCP has been speaking with him and his family over the past few days,   The initial plan was that he would present to the [facility] ER Monday 2/17 (ie tomorrow) for HD initiation. Today, however, he fell and sustained trauma to his knee. Given the fall and concern for knee fracture, he presented to the ER today. multiple xrays show no Fractures. (16 Feb 2025 19:41)    History of Present Illness:This 74-year-old male with a complex medical history including ESRD, history of polio with paraplegia and neurogenic bladder, and PAD presented after a fall. His admission was also prompted by worsening uremia requiring initiation of HD. 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 He underwent RIJ PermCath Placement on 2/17, bilateral iliac artery stenting on 3/3, and multiple wound debridements.   Hospital Course :The patient was initiated on HD via right subclavian PermCath. He experienced episodes of delirium treated with electrolyte correction and discontinuation of gabapentin. His PAD was addressed with bilateral iliac artery stents done 3/3 by Dr Swenson ; with  Multiple decubitus ulcers were identified and treated with debridement and wound care. He developed pneumonia, likely due to aspiration, and was treated with meropenem. A right IJ DVT was diagnosed and the patient was placed on heparin drip initially then switched to therapeutic enoxaparin. The patient’s chronic pain was managed with a multi-modal regimen including opioids, NSAIDs, and topical wound care. His mental status returned to baseline.    Discharge Medications:  Meropenem (renally dosed) to complete 10-day course  Enoxaparin (Lovenox) [dose and frequency for DVT treatment]  Oxycodone/OxyContin [dose and frequency]  Dilaudid [dose and frequency, PRN]  Ibuprofen [dose and frequency]  Atarax [dose and frequency, PRN itching]  Other medications as necessary (stool softeners, medications for hyponatremia management, etc.)  Discharge Disposition: Home with home health care    Follow-up Appointments:    Primary care physician within 1 week  Nephrology for HD follow-up  Infectious Disease follow-up within 1 week  Vascular surgery follow-up within 1 week  Wound care follow-up within 1 week  Podiatry for gangrenous toes  Physical and Occupational Therapy  Discharge Care Plan:  Infection Management: Complete 10-day course of meropenem. Monitor for signs/symptoms of infection recurrence (fever, increased wound drainage, pain). SPC site dressing changes and care per home health nursing.  Wound Care: Continue local wound care as instructed by wound care team. Offloading of pressure areas is crucial.  Pain Management: Continue prescribed pain medications as directed. Regularly assess pain levels and adjust medication as needed.  Medications: Continue all discharge medications as prescribed.  Hemodialysis: Continue HD as prescribed by nephrology.  DVT Management: Continue anticoagulation therapy as directed by vascular surgery. Monitor for signs of bleeding. Educate patient and family on signs and symptoms of DVT/PE.  Bowel Regimen: Monitor bowel movements and treat constipation as needed.  Nutrition: Ensure adequate nutritional intake. Consult with a registered dietitian if necessary.  Physical/Occupational Therapy: Continue with PT/OT to maintain mobility and function.  Fall Precautions: Implement fall precautions at home.  Caregiver Support: Ensure adequate caregiver support is available for the patient at home.  Prognosis:    The patient’s prognosis is guarded given his multiple comorbidities. Close monitoring and adherence to the discharge plan are crucial to minimize complications and maintain his functional status.

## 2025-02-21 NOTE — DISCHARGE NOTE PROVIDER - NSDCCPCAREPLAN_GEN_ALL_CORE_FT
PRINCIPAL DISCHARGE DIAGNOSIS  Diagnosis: Pain in both knees  Assessment and Plan of Treatment:       SECONDARY DISCHARGE DIAGNOSES  Diagnosis: ESRD (end stage renal disease)  Assessment and Plan of Treatment:   - continue HD through permacath, plan for AVF intervention outpatient      - continue to protect nondominant left arm      - can f/u with Dr. Eloy Swenson : [phone] ; [address] [facility], [address], [place] [address]        PRINCIPAL DISCHARGE DIAGNOSIS  Diagnosis: ESRD (end stage renal disease)  Assessment and Plan of Treatment: - continue HD through permacath, plan for AVF intervention outpatient      - continue to protect nondominant left arm      - can f/u with Dr. Eloy Swenson : [phone] ; [address] [facility], [address], [place] [address]   RECOMMEND:   (1)HD today per MWF schedule, next tomorrow  (2)retacrit with HD  )Dose Rx for CrCl <10 ESRD MWF        SECONDARY DISCHARGE DIAGNOSES  Diagnosis: Peripheral vascular disease  Assessment and Plan of Treatment:   - c/w ASA/Plavix  - continue HD through permacath, plan for AVF intervention outpatient      - continue to protect nondominant left arm      - can f/u with Dr. Eloy Swenson : [phone] ; [address] [facility], [address], [place] [address]       Diagnosis: Bacterial sepsis  Assessment and Plan of Treatment: completed broad spectrum antibiotics, 3/26 s/p bedside debridement of L knee wound  as dry eschar  from wound edges without drainage and bedside excisional debridement of unstageable sacrum to left buttock into perineum evolving unstageable pressure injury through necrotic dermis into nonviable subcutaneous tissues, debulking debridement of necrotic tissue done by Wound care; other wounds noted including L calf to ankle wound with liquefaction necrotic drainage; R ischium wound with moist pink granular tissue and L buttock fading DTI  3/30 overnight febrile to 100.9F ?inflammatory -- now afebrile x24h, WBC wnl, remains on meropenem   dry cough noted, COVID/Flu/RSV negative;   loose stools per pt after bowel regimen--now diarrhea, some nausea with no vomiting

## 2025-02-21 NOTE — DISCHARGE NOTE PROVIDER - DETAILS OF MALNUTRITION DIAGNOSIS/DIAGNOSES
This patient has been assessed with a concern for Malnutrition and was treated during this hospitalization for the following Nutrition diagnosis/diagnoses:     -  02/28/2025: Moderate protein-calorie malnutrition

## 2025-02-21 NOTE — PROGRESS NOTE ADULT - SUBJECTIVE AND OBJECTIVE BOX
[facility]  SABINO Griffin Y. Patel, S. Shah, G. Casimir  [phone]  ([phone] - weekdays after 5pm and weekends)    Name: BRIAN DEMPSEY  Age/Gender: 74y Male  MRN: [id number]    Interval History:  Patient seen and examined this morning.   Noted low grade temp 100.3F overnight.   Patient denies fever, chills, pain, n/v/d.   Notes reviewed.   Wife sleeping in recliner.   Allergies: No Known Allergies      Objective:  Vitals:   T(F): 98.5 (02-21-25 @ 08:38), Max: 100.3 (02-21-25 @ 00:43)  HR: 93 (02-21-25 @ 08:38) (76 - 99)  BP: 90/63 (02-21-25 @ 08:38) (90/63 - 135/67)  RR: 18 (02-21-25 @ 08:38) (17 - 18)  SpO2: 93% (02-21-25 @ 08:38) (86% - 99%)  Physical Examination:  General: no acute distress  HEENT: normocephalic, atraumatic, anicteric  Respiratory: clear to auscultation bilaterally   Cardiovascular: S1 and S2 present, normal rate   Gastrointestinal: soft, ND, NT, SPC site with chronic changes  Extremities: no edema, cool to touch some duskiness   Skin: no visible rash    Laboratory Studies:  CBC:                       8.7    8.36  )-----------( 214      ( 21 Feb 2025 07:17 )             29.5     WBC Trend:  8.36 02-21-25 @ 07:17  7.99 02-20-25 @ 02:05  6.99 02-19-25 @ 06:23  6.38 02-17-25 @ 11:37  6.87 02-16-25 @ 16:43    CMP: 02-21    133[L]  |  94[L]  |  17  ----------------------------<  91  3.8   |  25  |  3.39[H]    Ca    7.9[L]      21 Feb 2025 07:16  Phos  3.1     02-20  Mg     1.8     02-20    TPro  5.6[L]  /  Alb  2.4[L]  /  TBili  0.4  /  DBili  x   /  AST  8[L]  /  ALT  <5[L]  /  AlkPhos  88  02-21    Creatinine: 3.39 mg/dL (02-21-25 @ 07:16)  Creatinine: 3.21 mg/dL (02-20-25 @ 02:05)  Creatinine: 4.12 mg/dL (02-19-25 @ 06:23)  Creatinine: 5.33 mg/dL (02-18-25 @ 08:55)  Creatinine: 7.71 mg/dL (02-17-25 @ 11:37)  Creatinine: 7.76 mg/dL (02-16-25 @ 16:43)    LIVER FUNCTIONS - ( 21 Feb 2025 07:16 )  Alb: 2.4 g/dL / Pro: 5.6 g/dL / ALK PHOS: 88 U/L / ALT: <5 U/L / AST: 8 U/L / GGT: x           Microbiology: reviewed   Culture - Blood (collected 02-20-25 @ 01:55)  Source: .Blood Blood-Peripheral  Preliminary Report (02-21-25 @ 04:01):    No growth at 24 hours    02-20-25 @ 06:42 SARS-CoV-2 NotDetec/Influenza A NotDetec/Influenza B NotDetec/RSV NotDetec    Radiology: reviewed   < from: CT Angio Abd Aorta w/run-off w/ IV Cont (02.20.25 @ 18:52) >  IMPRESSION:    Occlusion of the bilateral external iliac arteries as well as the   bilateral superficial femoral arteries with distal reconstitution at the   popliteal arteries. Patent three-vessel runoff of the right lower   extremity with 2 vessel runoff of the left lower extremity with occlusion   of the left mid anterior tibial artery with distal reconstitution.    --- End of Report ---    < end of copied text >    < from: CT Head No Cont (02.20.25 @ 18:47) >  IMPRESSION:  No evidence of acute transcortical infarct, acute intracranial   hemorrhage, or mass effect.    --- End of Report ---    < end of copied text >    < from: Xray Chest 1 View- PORTABLE-Urgent (Xray Chest 1 View- PORTABLE-Urgent .) (02.20.25 @ 03:43) >  FINDINGS:    Chest x-ray 2/18/2025 4:31 PM  Right IJ central venous catheter terminating in the SVC.  The heart is normal in size.  Elevated right hemidiaphragm. The lungs are clear. There is no   pneumothorax or pleural effusion.    2/20/2025 1:44 AM  Similar findings as mentioned above.    IMPRESSION:  Clear lungs    --- End of Report ---    < end of copied text >      Medications:  acetaminophen     Tablet .. 650 milliGRAM(s) Oral every 6 hours PRN  atorvastatin 40 milliGRAM(s) Oral at bedtime  benzonatate 100 milliGRAM(s) Oral every 8 hours PRN  calcium acetate 2001 milliGRAM(s) Oral three times a day with meals  chlorhexidine 4% Liquid 1 Application(s) Topical <User Schedule>  epoetin aleah-epbx (RETACRIT) Injectable 13329 Unit(s) IV Push <User Schedule>  guaiFENesin ER 1200 milliGRAM(s) Oral every 12 hours PRN  heparin   Injectable 5000 Unit(s) SubCutaneous every 8 hours  HYDROmorphone  Injectable 2 milliGRAM(s) IV Push every 6 hours PRN  hydrOXYzine hydrochloride 25 milliGRAM(s) Oral four times a day  ketoconazole 2% Shampoo 1 Application(s) Topical <User Schedule>  oxyCODONE    IR 10 milliGRAM(s) Oral every 6 hours PRN  oxyCODONE  ER Tablet 30 milliGRAM(s) Oral every 12 hours  pantoprazole    Tablet 40 milliGRAM(s) Oral before breakfast  piperacillin/tazobactam IVPB.. 3.375 Gram(s) IV Intermittent every 12 hours  pregabalin 25 milliGRAM(s) Oral two times a day  sodium chloride 0.9% lock flush 10 milliLiter(s) IV Push every 1 hour PRN    Current Antimicrobials:  piperacillin/tazobactam IVPB.. 3.375 Gram(s) IV Intermittent every 12 hours    Prior/Completed Antimicrobials:  piperacillin/tazobactam IVPB.  piperacillin/tazobactam IVPB.-  vancomycin  IVPB

## 2025-02-21 NOTE — DISCHARGE NOTE PROVIDER - CARE PROVIDER_API CALL
anahi langley  [address] [place] [address] dr SPENCER [address] [place] [address]  Phone: [phone]  Fax: [phone]  Follow Up Time: 1 week   Eloy Swenson  Vascular Surgery  [address] [facility], [address]  [place] [address]  Phone: [phone]  Fax: [phone]  Follow Up Time: 2 weeks    anahi langley  [address] [place] [address] [address]  [place] [address]  Phone: [phone]  Fax: [phone]  Follow Up Time: 1 week    Rafita Denny  Nephrology  [address] [facility] [address]  [place] [address]  Phone: [phone]  Fax: [phone]  Follow Up Time: 1 week

## 2025-02-21 NOTE — PROGRESS NOTE ADULT - SUBJECTIVE AND OBJECTIVE BOX
Patient is a 74y old  Male who presents with a chief complaint of ESRD requiring HD, uremia (21 Feb 2025 14:20)      SUBJECTIVE / OVERNIGHT EVENTS: ptn is tearful, a bit confused, coughing, recognizes me and family at bedside. GOC d/w daughter and wife. AMS prob delirium 2/2 acute illness +/- opiods, lyrica, atarac. will lower atarak to tid, dc lyrica, cont Oxy 2/2 ptn has severe LE pain. neuro called for eval. Head ct done yesterday w no acute findings. CTA A/P w LE run fof: severe PAD, vascular to follow up on plan fo care. plan for HD tomorrow and next week place on tiw schedule of MWF. will need tunneled catheter prior to DC and will d/w vascular scheduling of AVF. ptn wants all to be done while inptn. daughter wants to be HCP as per ptn's wishes. she was given paperwork to get it signed and witnessed and will present to nursing thereafter. details of findings and complaints and plan of care d/w daughter and wife. spent 60 min. RVP ordered. start mucinex , tessalon perles, duoneclemente, get CT chest to r/o PNA. pulm called    MEDICATIONS  (STANDING):  albuterol/ipratropium for Nebulization 3 milliLiter(s) Nebulizer every 6 hours  atorvastatin 40 milliGRAM(s) Oral at bedtime  benzonatate 100 milliGRAM(s) Oral three times a day  calcium acetate 2001 milliGRAM(s) Oral three times a day with meals  chlorhexidine 4% Liquid 1 Application(s) Topical <User Schedule>  epoetin aleah-epbx (RETACRIT) Injectable 48448 Unit(s) IV Push <User Schedule>  guaiFENesin ER 1200 milliGRAM(s) Oral every 12 hours  heparin   Injectable 5000 Unit(s) SubCutaneous every 8 hours  hydrOXYzine hydrochloride 25 milliGRAM(s) Oral two times a day  ketoconazole 2% Shampoo 1 Application(s) Topical <User Schedule>  oxyCODONE  ER Tablet 30 milliGRAM(s) Oral every 12 hours  pantoprazole    Tablet 40 milliGRAM(s) Oral before breakfast  piperacillin/tazobactam IVPB.. 3.375 Gram(s) IV Intermittent every 12 hours    MEDICATIONS  (PRN):  acetaminophen     Tablet .. 650 milliGRAM(s) Oral every 6 hours PRN Temp greater or equal to 38C (100.4F), Mild Pain (1 - 3)  guaiFENesin ER 1200 milliGRAM(s) Oral every 12 hours PRN Cough  HYDROmorphone  Injectable 2 milliGRAM(s) IV Push every 6 hours PRN Severe Pain (7 - 10)  oxyCODONE    IR 10 milliGRAM(s) Oral every 6 hours PRN moderate breakthrough Pain  sodium chloride 0.9% lock flush 10 milliLiter(s) IV Push every 1 hour PRN Pre/post blood products, medications, blood draw, and to maintain line patency      Vital Signs Last 24 Hrs  T(F): 98.1 (02-21-25 @ 15:49), Max: 100.3 (02-21-25 @ 00:43)  HR: 95 (02-21-25 @ 15:49) (93 - 95)  BP: 98/68 (02-21-25 @ 15:49) (90/63 - 135/67)  RR: 18 (02-21-25 @ 15:49) (18 - 18)  SpO2: 94% (02-21-25 @ 15:49) (91% - 94%)  Telemetry:   CAPILLARY BLOOD GLUCOSE      POCT Blood Glucose.: 100 mg/dL (21 Feb 2025 12:06)    I&O's Summary    20 Feb 2025 07:01  -  21 Feb 2025 07:00  --------------------------------------------------------  IN: 1000 mL / OUT: 900 mL / NET: 100 mL    21 Feb 2025 07:01  -  21 Feb 2025 18:02  --------------------------------------------------------  IN: 0 mL / OUT: 295 mL / NET: -295 mL        PHYSICAL EXAM:  GENERAL: NAD, well-developed  HEAD:  Atraumatic, Normocephalic  EYES: EOMI, PERRLA, conjunctiva and sclera clear  NECK: Supple, No JVD  CHEST/LUNG: Clear to auscultation bilaterally; No wheeze  HEART: Regular rate and rhythm; No murmurs, rubs, or gallops  ABDOMEN: Soft, Nontender, Nondistended; Bowel sounds present  EXTREMITIES:  2+ Peripheral Pulses, No clubbing, cyanosis, or edema  PSYCH: AAOx3  NEUROLOGY: non-focal  SKIN: No rashes or lesions    LABS:                        8.7    8.36  )-----------( 214      ( 21 Feb 2025 07:17 )             29.5     02-21    133[L]  |  94[L]  |  17  ----------------------------<  91  3.8   |  25  |  3.39[H]    Ca    7.9[L]      21 Feb 2025 07:16  Phos  3.1     02-20  Mg     1.8     02-20    TPro  5.6[L]  /  Alb  2.4[L]  /  TBili  0.4  /  DBili  x   /  AST  8[L]  /  ALT  <5[L]  /  AlkPhos  88  02-21    PT/INR - ( 20 Feb 2025 02:05 )   PT: 13.8 sec;   INR: 1.20 ratio         PTT - ( 20 Feb 2025 02:05 )  PTT:31.1 sec      Urinalysis Basic - ( 21 Feb 2025 07:16 )    Color: x / Appearance: x / SG: x / pH: x  Gluc: 91 mg/dL / Ketone: x  / Bili: x / Urobili: x   Blood: x / Protein: x / Nitrite: x   Leuk Esterase: x / RBC: x / WBC x   Sq Epi: x / Non Sq Epi: x / Bacteria: x        RADIOLOGY & ADDITIONAL TESTS:    Imaging Personally Reviewed:    Consultant(s) Notes Reviewed:      Care Discussed with Consultants/Other Providers:

## 2025-02-21 NOTE — CONSULT NOTE ADULT - ASSESSMENT
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  came in after fall and for HD.     2/20 CTH neg   \Na 123 --> now 133   A1c 5.7   TSH WNL 3.46   o/e 2/21 AAOx2, uppers 4/5, lowers limited .    Imrpssion  1) AMS, waxing and waing , likely multifactorial from infection, metabolic/electrolyte derrangements/ delerium / medication effect , ureemia which is imporving   2) polio  3) fall 2/2 weakness  4) hyponatremia to 123 2/20 now imrpoved 133       - on zosyn for infection   - lyrica 25mg BID  - getting oxycodone ER 30mg BID and oxy PRN IR i10 and dilauded PRN   - limit sedating meds   - continue to monitor and correct metabolic derrangements .  - delerium precuations.   - frequent re orientation  - check b12, RPR, ammonia level   - will consdier MRI brain or EEG if doesn't improve but seems to have been improving   - PT/OT   - check FS, glucose control <180  - GI/DVT ppx  - Thank you for allowing me to participate in the care of this patient. Call with questions.   - spoke with wife and nephrology   Paul Limon MD  Vascular Neurology  Office: [phone]

## 2025-02-22 NOTE — CONSULT NOTE ADULT - ASSESSMENT
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 The initial plan was that he would present to the [facility] ER Monday 2/17 for HD initiation. However, day of admission, he fell and sustained trauma to his knee. Called to consult for cough, elevated R hemidiaphragm.

## 2025-02-22 NOTE — CONSULT NOTE ADULT - PROBLEM SELECTOR RECOMMENDATION 9
-CXR with elevated R hemidiaphragm (not present on CXR in December 2024)  -Low grade temp, cough  -Check CT chest to r/o PNA, pl effusion/atelectasis. Continue empiric abx as per ID  -Normoxic, keep sats >90% with o2 PRN  -Pt very lethargic, check ABG

## 2025-02-22 NOTE — PROGRESS NOTE ADULT - ASSESSMENT
74 year old male with CKD, sp polio, paraplegia with associated neurogenic bladder s/p suprapubic catheter who was admitted s/p fall on 2/16.   CKD - ESRD, now s/p DEYA [place] 2/17, on HD  ruled out cellulitis around suprapubic cath  2/20 s/p RRT for tremors and confusion -- pt with chronic tremors although notably worse  hyponatremia    labs sars cov2/rsv/flu neg  mrsa negative   2/20 CXR with clear lungs     s/p vancomycin x1 2/20  s/p zosyn 2/20--     ongoing low grade temp elevations, pt asx, no leukocytosis  SPC site with chronic/stable inflammatory changes, no drainage - no sign of SSTI  mental status at baseline now, nontoxic appearing   CTA abd with occlusion of b/l external iliac arteries and b/l superficial femoral arteries with distal reconstitution at popliteal arteries; patent three vessel runoff of RLE with 2 vessel runoff of LLE with occlusion of L mid anterior tibial artery with distal reconstitution  CTH with no acute findings     Recommendations:   Follow Bcx - NGTD  sp empiric zosyn   - BCx remains negative x48h, will d/c and monitor off antibiotics   monitor for fever and trend wbc   Vascular surgery following   HD per renal    Continue rest of care per primary team     Thank you for consulting us and involving us in the management of this most interesting and challenging case.  In addition to reviewing history, imaging, documents, labs, microbiology, took into account antibiotic stewardship, local antibiogram and infection control strategies and potential transmission issues.    We will follow along in the care of this patient. Please contact me by texting me directly on my cell# at [phone] using TEAMS or call our answering service at [phone] with any concerns.

## 2025-02-22 NOTE — PROGRESS NOTE ADULT - SUBJECTIVE AND OBJECTIVE BOX
Seen during HD. not interactive this AM.         VITAL:  T(C): , Max: 37.9 (02-21-25 @ 00:43)  T(F): , Max: 100.3 (02-21-25 @ 00:43)  HR: 93 (02-21-25 @ 08:38)  BP: 90/63 (02-21-25 @ 08:38)  BP(mean): --  RR: 18 (02-21-25 @ 08:38)  SpO2: 93% (02-21-25 @ 08:38)  Wt(kg): --      PHYSICAL EXAM:  Constitutional: lethargic  HEENT: NCAT, DMM  Neck: Supple, No JVD  Respiratory: CTA-b/l  Cardiovascular: RRR s1s2, no m/r/g  Gastrointestinal: BS+, soft, NT/ND  Extremities: No peripheral edema b/l  Neurological: no focal deficits; strength grossly intact  Back: no CVAT b/l  Skin: No rashes, no nevi  Access: [facility]      LABS:                        8.7    8.36  )-----------( 214      ( 21 Feb 2025 07:17 )             29.5     Na(133)/K(3.8)/Cl(94)/HCO3(25)/BUN(17)/Cr(3.39)Glu(91)/Ca(7.9)/Mg(--)/PO4(--)    02-21 @ 07:16  Na(123)/K(3.7)/Cl(88)/HCO3(25)/BUN(22)/Cr(3.21)Glu(117)/Ca(7.5)/Mg(1.8)/PO4(3.1)    02-20 @ 02:05  Na(135)/K(4.1)/Cl(97)/HCO3(25)/BUN(37)/Cr(4.12)Glu(97)/Ca(7.8)/Mg(2.1)/PO4(3.9)    02-19 @ 06:23        IMAGING:    < from: CT Angio Abd Aorta w/run-off w/ IV Cont (02.20.25 @ 18:52) >  Occlusion of the bilateral external iliac arteries as well as the   bilateral superficial femoral arteries with distal reconstitution at the   popliteal arteries. Patent three-vessel runoff of the right lower   extremity with 2 vessel runoff of the left lower extremity with occlusion   of the left mid anterior tibial artery with distal reconstitution.        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 He will also need conversion of shiley to tunneled cath by IR prior to discharge    (4)Hyperphosphatemia - improved, now that he is on HD and on Phoslo    (5)Anemia - s/p IV iron; on Retacrit with HD    (6)ID - low-grade fever - unclear etiology - ID on board - on Zosyn/Vanco IV     (6)Neuro - unclear exact etiology - multifactorial with infection, e- abn     (7)PAD - significant disease on CTA - Vascular on board    (8)Dispo - would like to get him accepted to [facility] in [place] for in-center HD for now. Eventually, we would look to try to transition him to home HD.      RECOMMEND:  (1)HD today high-Na+ bath; Retacrit with HD; no net UF, next monday  (2)Neuro evaluation appreciated  (3)Abx per primary team/ID, dose for GFR<10/HD  (4)Vascular f/u regarding PAD management as well as eventual AV access  (5)Eventual tunneled cath with IR once infectious illness/mental status improved  (6)Efforts to have patient accepted to [facility] for outside HD      Fletcher Whitehead DO   [facility]  Office/on call physician: [phone]

## 2025-02-22 NOTE — PROGRESS NOTE ADULT - SUBJECTIVE AND OBJECTIVE BOX
Patient is a 74y old  Male who presents with a chief complaint of ESRD requiring HD, uremia (22 Feb 2025 09:33)      SUBJECTIVE / OVERNIGHT EVENTS: ptn is drowsy but arousable, calmer today, answers questions appropriately. he is pain free, he stopped coughing, he denies having pruritis: will DC:  OXY ER, Atarax, Tessalon perles.     MEDICATIONS  (STANDING):  albuterol/ipratropium for Nebulization 3 milliLiter(s) Nebulizer every 6 hours  atorvastatin 40 milliGRAM(s) Oral at bedtime  calcium acetate 2001 milliGRAM(s) Oral three times a day with meals  chlorhexidine 4% Liquid 1 Application(s) Topical <User Schedule>  epoetin aleah-epbx (RETACRIT) Injectable 42834 Unit(s) IV Push <User Schedule>  guaiFENesin ER 1200 milliGRAM(s) Oral every 12 hours  heparin   Injectable 5000 Unit(s) SubCutaneous every 8 hours  ketoconazole 2% Shampoo 1 Application(s) Topical <User Schedule>  pantoprazole    Tablet 40 milliGRAM(s) Oral before breakfast  piperacillin/tazobactam IVPB.. 3.375 Gram(s) IV Intermittent every 12 hours    MEDICATIONS  (PRN):  acetaminophen     Tablet .. 650 milliGRAM(s) Oral every 6 hours PRN Temp greater or equal to 38C (100.4F), Mild Pain (1 - 3)  HYDROmorphone  Injectable 2 milliGRAM(s) IV Push every 6 hours PRN Severe Pain (7 - 10)  oxyCODONE    IR 10 milliGRAM(s) Oral every 6 hours PRN moderate breakthrough Pain  sodium chloride 0.9% lock flush 10 milliLiter(s) IV Push every 1 hour PRN Pre/post blood products, medications, blood draw, and to maintain line patency      Vital Signs Last 24 Hrs  T(F): 97.2 (02-22-25 @ 10:58), Max: 100 (02-21-25 @ 23:19)  HR: 88 (02-22-25 @ 10:58) (74 - 95)  BP: 109/56 (02-22-25 @ 10:58) (98/68 - 127/67)  RR: 18 (02-22-25 @ 10:58) (17 - 18)  SpO2: 92% (02-22-25 @ 10:58) (92% - 95%)  Telemetry:   CAPILLARY BLOOD GLUCOSE      POCT Blood Glucose.: 189 mg/dL (22 Feb 2025 06:42)    I&O's Summary    21 Feb 2025 07:01  -  22 Feb 2025 07:00  --------------------------------------------------------  IN: 0 mL / OUT: 295 mL / NET: -295 mL    22 Feb 2025 07:01  -  22 Feb 2025 14:35  --------------------------------------------------------  IN: 0 mL / OUT: 800 mL / NET: -800 mL        PHYSICAL EXAM:  GENERAL: NAD, well-developed  HEAD:  Atraumatic, Normocephalic  EYES: EOMI, PERRLA, conjunctiva and sclera clear  NECK: Supple, No JVD  CHEST/LUNG: Clear to auscultation bilaterally; No wheeze  HEART: Regular rate and rhythm; No murmurs, rubs, or gallops  ABDOMEN: Soft, Nontender, Nondistended; Bowel sounds present  EXTREMITIES:  2+ Peripheral Pulses, No clubbing, cyanosis, or edema  PSYCH: AAOx3  NEUROLOGY: non-focal  SKIN: No rashes or lesions    LABS:                        8.3    8.51  )-----------( 229      ( 22 Feb 2025 07:20 )             27.7     02-22    134[L]  |  95[L]  |  27[H]  ----------------------------<  144[H]  4.2   |  24  |  4.99[H]    Ca    8.3[L]      22 Feb 2025 07:03    TPro  5.7[L]  /  Alb  2.5[L]  /  TBili  0.2  /  DBili  x   /  AST  11  /  ALT  5[L]  /  AlkPhos  96  02-22          Urinalysis Basic - ( 22 Feb 2025 07:03 )    Color: x / Appearance: x / SG: x / pH: x  Gluc: 144 mg/dL / Ketone: x  / Bili: x / Urobili: x   Blood: x / Protein: x / Nitrite: x   Leuk Esterase: x / RBC: x / WBC x   Sq Epi: x / Non Sq Epi: x / Bacteria: x        RADIOLOGY & ADDITIONAL TESTS:    Imaging Personally Reviewed:    Consultant(s) Notes Reviewed:      Care Discussed with Consultants/Other Providers:

## 2025-02-22 NOTE — PROGRESS NOTE ADULT - SUBJECTIVE AND OBJECTIVE BOX
[facility]  Dat Davis MD PhD, Maddi Eden MD, Amy Bailey MD, Bridgette Ramirez MD, Juma Angela MD  and providing coverage with Demetri Bosch MD  Providing Infectious Disease Consultations at [facility], [facility], [facility], [facility]    Office# [phone] to schedule follow up appointments  Answering Service for urgent calls or New Consults [phone]  Cell# to text for urgent issues Dat Davis [phone]     infectious diseases progress note:    BRIAN DEMPSEY is a 74y y. o. Male patient    Overnight and events of the last 24hrs reviewed    Allergies    No Known Allergies    Intolerances        ANTIBIOTICS/RELEVANT:  antimicrobials  piperacillin/tazobactam IVPB.. 3.375 Gram(s) IV Intermittent every 12 hours    immunologic:  epoetin aleah-epbx (RETACRIT) Injectable 80302 Unit(s) IV Push <User Schedule>    OTHER:  acetaminophen     Tablet .. 650 milliGRAM(s) Oral every 6 hours PRN  albuterol/ipratropium for Nebulization 3 milliLiter(s) Nebulizer every 6 hours  atorvastatin 40 milliGRAM(s) Oral at bedtime  calcium acetate 2001 milliGRAM(s) Oral three times a day with meals  chlorhexidine 4% Liquid 1 Application(s) Topical <User Schedule>  guaiFENesin ER 1200 milliGRAM(s) Oral every 12 hours  heparin   Injectable 5000 Unit(s) SubCutaneous every 8 hours  HYDROmorphone  Injectable 2 milliGRAM(s) IV Push every 6 hours PRN  ketoconazole 2% Shampoo 1 Application(s) Topical <User Schedule>  oxyCODONE    IR 10 milliGRAM(s) Oral every 6 hours PRN  pantoprazole    Tablet 40 milliGRAM(s) Oral before breakfast  sodium chloride 0.9% lock flush 10 milliLiter(s) IV Push every 1 hour PRN      Objective:  Vital Signs Last 24 Hrs  T(C): 36.7 (22 Feb 2025 15:42), Max: 37.8 (21 Feb 2025 23:19)  T(F): 98 (22 Feb 2025 15:42), Max: 100 (21 Feb 2025 23:19)  HR: 85 (22 Feb 2025 15:42) (74 - 94)  BP: 124/64 (22 Feb 2025 15:42) (109/50 - 127/67)  BP(mean): --  RR: 18 (22 Feb 2025 15:42) (17 - 18)  SpO2: 95% (22 Feb 2025 15:42) (92% - 95%)    Parameters below as of 22 Feb 2025 15:42  Patient On (Oxygen Delivery Method): room air        T(C): 36.7 (02-22-25 @ 15:42), Max: 37.9 (02-21-25 @ 00:43)  T(C): 36.7 (02-22-25 @ 15:42), Max: 37.9 (02-21-25 @ 00:43)  T(C): 36.7 (02-22-25 @ 15:42), Max: 37.9 (02-21-25 @ 00:43)    PHYSICAL EXAM:  HEENT: NC atraumatic  Neck: supple  Respiratory: no accessory muscle use, breathing comfortably  Cardiovascular: distant  Gastrointestinal: normal appearing, nondistended  Extremities: no clubbing, no cyanosis,        LABS:                          8.3    8.51  )-----------( 229      ( 22 Feb 2025 07:20 )             27.7       WBC  8.51 02-22 @ 07:20  8.36 02-21 @ 07:17  7.99 02-20 @ 02:05  6.99 02-19 @ 06:23  6.38 02-17 @ 11:37  6.87 02-16 @ 16:43      02-22    134[L]  |  95[L]  |  27[H]  ----------------------------<  144[H]  4.2   |  24  |  4.99[H]    Ca    8.3[L]      22 Feb 2025 07:03    TPro  5.7[L]  /  Alb  2.5[L]  /  TBili  0.2  /  DBili  x   /  AST  11  /  ALT  5[L]  /  AlkPhos  96  02-22      Creatinine: 4.99 mg/dL (02-22-25 @ 07:03)  Creatinine: 3.39 mg/dL (02-21-25 @ 07:16)  Creatinine: 3.21 mg/dL (02-20-25 @ 02:05)  Creatinine: 4.12 mg/dL (02-19-25 @ 06:23)  Creatinine: 5.33 mg/dL (02-18-25 @ 08:55)  Creatinine: 7.71 mg/dL (02-17-25 @ 11:37)  Creatinine: 7.76 mg/dL (02-16-25 @ 16:43)        Urinalysis Basic - ( 22 Feb 2025 07:03 )    Color: x / Appearance: x / SG: x / pH: x  Gluc: 144 mg/dL / Ketone: x  / Bili: x / Urobili: x   Blood: x / Protein: x / Nitrite: x   Leuk Esterase: x / RBC: x / WBC x   Sq Epi: x / Non Sq Epi: x / Bacteria: x            INFLAMMATORY MARKERS      MICROBIOLOGY:              RADIOLOGY & ADDITIONAL STUDIES:

## 2025-02-22 NOTE — PROGRESS NOTE ADULT - SUBJECTIVE AND OBJECTIVE BOX
Subjective: Patient seen and examined. No new events except as noted.   wife at bedside     REVIEW OF SYSTEMS:    CONSTITUTIONAL: +weakness, fevers or chills  EYES/ENT: No visual changes;  No vertigo or throat pain   NECK: No pain or stiffness  RESPIRATORY: No cough, wheezing, hemoptysis; No shortness of breath  CARDIOVASCULAR: No chest pain or palpitations  GASTROINTESTINAL: No abdominal or epigastric pain. No nausea, vomiting, or hematemesis; No diarrhea or constipation. No melena or hematochezia.  GENITOURINARY: No dysuria, frequency or hematuria  NEUROLOGICAL: No numbness or weakness  SKIN: No itching, burning, rashes, or lesions   All other review of systems is negative unless indicated above.    MEDICATIONS:  MEDICATIONS  (STANDING):  albuterol/ipratropium for Nebulization 3 milliLiter(s) Nebulizer every 6 hours  atorvastatin 40 milliGRAM(s) Oral at bedtime  calcium acetate 2001 milliGRAM(s) Oral three times a day with meals  chlorhexidine 4% Liquid 1 Application(s) Topical <User Schedule>  epoetin aleah-epbx (RETACRIT) Injectable 42822 Unit(s) IV Push <User Schedule>  guaiFENesin ER 1200 milliGRAM(s) Oral every 12 hours  heparin   Injectable 5000 Unit(s) SubCutaneous every 8 hours  ketoconazole 2% Shampoo 1 Application(s) Topical <User Schedule>  pantoprazole    Tablet 40 milliGRAM(s) Oral before breakfast      PHYSICAL EXAM:  T(C): 36.7 (02-22-25 @ 15:42), Max: 37.8 (02-21-25 @ 23:19)  HR: 85 (02-22-25 @ 15:42) (74 - 94)  BP: 124/64 (02-22-25 @ 15:42) (109/50 - 127/67)  RR: 18 (02-22-25 @ 15:42) (17 - 18)  SpO2: 95% (02-22-25 @ 15:42) (92% - 95%)  Wt(kg): --  I&O's Summary    21 Feb 2025 07:01  -  22 Feb 2025 07:00  --------------------------------------------------------  IN: 0 mL / OUT: 295 mL / NET: -295 mL    22 Feb 2025 07:01  -  22 Feb 2025 21:22  --------------------------------------------------------  IN: 0 mL / OUT: 800 mL / NET: -800 mL          Appearance: Normal	  HEENT:   Normal oral mucosa, PERRL, EOMI	  Lymphatic: No lymphadenopathy  Cardiovascular: Normal S1 S2, No JVD, No murmurs, No edema  Respiratory: Lungs clear to auscultation	  Psychiatry: A & O x 3, Mood & affect appropriate  Skin: No rashes, No ecchymoses, No cyanosis	  Neurologic: Non-focal  GI/Abd: Soft, obese, nontender. Dressing to [facility] C/D/I. Suprapubic catheter in place  Ext: Palp femoral pulses bilat. BLE atrophic, minimal dorsi/plantarflexion bilaterally. Sensation grossly intact. LLE edematous compared to R, erythema to right toes/forefoot. No active wounds bilat.   Vascular: Peripheral pulses palpable 2+ bilaterally    LABS:    CARDIAC MARKERS:                                8.3    8.51  )-----------( 229      ( 22 Feb 2025 07:20 )             27.7     02-22    134[L]  |  95[L]  |  27[H]  ----------------------------<  144[H]  4.2   |  24  |  4.99[H]    Ca    8.3[L]      22 Feb 2025 07:03    TPro  5.7[L]  /  Alb  2.5[L]  /  TBili  0.2  /  DBili  x   /  AST  11  /  ALT  5[L]  /  AlkPhos  96  02-22    proBNP:   Lipid Profile:   HgA1c:   TSH:             TELEMETRY: 	    ECG:  	  RADIOLOGY:   DIAGNOSTIC TESTING:  [ ] Echocardiogram:  [ ]  Catheterization:  [ ] Stress Test:    OTHER:

## 2025-02-22 NOTE — CONSULT NOTE ADULT - SUBJECTIVE AND OBJECTIVE BOX
PULMONARY CONSULT    Covering Dr. Serna        HPI: 75 y/o M with PMH of polio with associated neurogenic bladder/suprapubic catheter, iatrogenic rectal rupture requiring colostomy 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 S/p admissions at [facility] 12/20-12/25/24 and 2/4-2/7 with SONDRA on CKD with associated uremic symptoms. At the last admission he had reportely expressed strong interest to try to hold off with HD intiation but he has been getting worse clinically at home. His SONDRA and uremic symptoms significantly improved after the first admission; they improved a to mild extent during the 2nd admission to the point where he was able to be discharged without HD. Since then, however, he has worsened further. He has been suffering from progressively worsening diffuse pruritus, loss of appetite, and generalized weakness and falls. The initial plan was that he would present to the [facility] ER Monday 2/17 for HD initiation. However, day of admission, he fell and sustained trauma to his knee.         PAST MEDICAL & SURGICAL HISTORY:  Polio  Hypertension  H/O urinary retention suprapubic tube  HLD (hyperlipidemia)  BPH with obstruction/lower urinary tract symptoms  Erectile dysfunction  Bladder outlet obstruction  Presence of suprapubic catheter  Colostomy status  DVT, lower extremity  H/O cardiac murmur  S/P colostomy 2/2024  Suprapubic catheter  S/P ORIF (open reduction internal fixation) fracture b/l legs  S/P cataract surgery right  H/O shoulder surgery      Allergies  No Known Allergies      FAMILY HISTORY:  FH: type 2 diabetes      Social history:     Review of Systems:  CONSTITUTIONAL: No fever, chills, or fatigue  EYES: No eye pain, visual disturbances, or discharge  ENMT:  No difficulty hearing, tinnitus, vertigo; No sinus or throat pain  NECK: No pain or stiffness  RESPIRATORY: Per above  CARDIOVASCULAR: No chest pain, palpitations, dizziness, or leg swelling  GASTROINTESTINAL: No abdominal or epigastric pain. No nausea, vomiting, or hematemesis; No diarrhea or constipation. No melena or hematochezia.  GENITOURINARY: No dysuria, frequency, hematuria, or incontinence  NEUROLOGICAL: No headaches, memory loss, loss of strength, numbness, or tremors  SKIN: No itching, burning, rashes, or lesions   MUSCULOSKELETAL: No joint pain or swelling; No muscle, back, or extremity pain  PSYCHIATRIC: No depression, anxiety, mood swings, or difficulty sleeping      Medications:  MEDICATIONS  (STANDING):  albuterol/ipratropium for Nebulization 3 milliLiter(s) Nebulizer every 6 hours  atorvastatin 40 milliGRAM(s) Oral at bedtime  benzonatate 100 milliGRAM(s) Oral three times a day  calcium acetate 2001 milliGRAM(s) Oral three times a day with meals  chlorhexidine 4% Liquid 1 Application(s) Topical <User Schedule>  epoetin aleah-epbx (RETACRIT) Injectable 07733 Unit(s) IV Push <User Schedule>  guaiFENesin ER 1200 milliGRAM(s) Oral every 12 hours  heparin   Injectable 5000 Unit(s) SubCutaneous every 8 hours  hydrOXYzine hydrochloride 25 milliGRAM(s) Oral two times a day  ketoconazole 2% Shampoo 1 Application(s) Topical <User Schedule>  oxyCODONE  ER Tablet 30 milliGRAM(s) Oral every 12 hours  pantoprazole    Tablet 40 milliGRAM(s) Oral before breakfast  piperacillin/tazobactam IVPB.. 3.375 Gram(s) IV Intermittent every 12 hours    MEDICATIONS  (PRN):  acetaminophen     Tablet .. 650 milliGRAM(s) Oral every 6 hours PRN Temp greater or equal to 38C (100.4F), Mild Pain (1 - 3)  HYDROmorphone  Injectable 2 milliGRAM(s) IV Push every 6 hours PRN Severe Pain (7 - 10)  oxyCODONE    IR 10 milliGRAM(s) Oral every 6 hours PRN moderate breakthrough Pain  sodium chloride 0.9% lock flush 10 milliLiter(s) IV Push every 1 hour PRN Pre/post blood products, medications, blood draw, and to maintain line patency            Vital Signs Last 24 Hrs  T(C): 36.9 (22 Feb 2025 08:08), Max: 37.8 (21 Feb 2025 23:19)  T(F): 98.5 (22 Feb 2025 08:08), Max: 100 (21 Feb 2025 23:19)  HR: 74 (22 Feb 2025 08:08) (74 - 95)  BP: 122/72 (22 Feb 2025 08:08) (98/68 - 127/67)  BP(mean): --  RR: 18 (22 Feb 2025 08:08) (17 - 18)  SpO2: 95% (22 Feb 2025 08:08) (93% - 95%)    Parameters below as of 22 Feb 2025 08:08  Patient On (Oxygen Delivery Method): room air                02-21 @ 07:01  -  02-22 @ 07:00  --------------------------------------------------------  IN: 0 mL / OUT: 295 mL / NET: -295 mL          LABS:                        8.3    8.51  )-----------( 229      ( 22 Feb 2025 07:20 )             27.7     02-22    134[L]  |  95[L]  |  27[H]  ----------------------------<  144[H]  4.2   |  24  |  4.99[H]    Ca    8.3[L]      22 Feb 2025 07:03    TPro  5.7[L]  /  Alb  2.5[L]  /  TBili  0.2  /  DBili  x   /  AST  11  /  ALT  5[L]  /  AlkPhos  96  02-22          CAPILLARY BLOOD GLUCOSE      POCT Blood Glucose.: 189 mg/dL (22 Feb 2025 06:42)      Urinalysis Basic - ( 22 Feb 2025 07:03 )    Color: x / Appearance: x / SG: x / pH: x  Gluc: 144 mg/dL / Ketone: x  / Bili: x / Urobili: x   Blood: x / Protein: x / Nitrite: x   Leuk Esterase: x / RBC: x / WBC x   Sq Epi: x / Non Sq Epi: x / Bacteria: x                    CULTURES:      (collected 02-20-25 @ 01:55)  Source: .Blood Blood-Peripheral  Preliminary Report (02-22-25 @ 04:01):    No growth at 48 Hours                      Physical Examination:    General: No acute distress.      HEENT: Pupils equal, reactive to light.  Symmetric.    PULM: Clear to auscultation bilaterally, no significant sputum production    CVS: S1, S2    ABD: Soft, nondistended, nontender, normoactive bowel sounds, no masses    EXT: No edema, nontender    SKIN: Warm and well perfused, no rashes noted.    NEURO: Alert, oriented, interactive, nonfocal    RADIOLOGY REVIEWED  CXR 2/20: elevated R hemidiaphragm    PULMONARY CONSULT    Covering Dr. Serna        HPI: 75 y/o M with PMH of polio with associated neurogenic bladder/suprapubic catheter, iatrogenic rectal rupture requiring colostomy 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 S/p admissions at [facility] 12/20-12/25/24 and 2/4-2/7 with SONDRA on CKD with associated uremic symptoms. At the last admission he had reportely expressed strong interest to try to hold off with HD intiation but he has been getting worse clinically at home. His SONDRA and uremic symptoms significantly improved after the first admission; they improved a to mild extent during the 2nd admission to the point where he was able to be discharged without HD. Since then, however, he has worsened further. He has been suffering from progressively worsening diffuse pruritus, loss of appetite, and generalized weakness and falls. The initial plan was that he would present to the [facility] ER Monday 2/17 for HD initiation. However, day of admission, he fell and sustained trauma to his knee. Called to consult for cough, elevated R hemidiaphragm. ROS limited - pt very lethargic.         PAST MEDICAL & SURGICAL HISTORY:  Polio  Hypertension  H/O urinary retention suprapubic tube  HLD (hyperlipidemia)  BPH with obstruction/lower urinary tract symptoms  Erectile dysfunction  Bladder outlet obstruction  Presence of suprapubic catheter  Colostomy status  DVT, lower extremity  H/O cardiac murmur  S/P colostomy 2/2024  Suprapubic catheter  S/P ORIF (open reduction internal fixation) fracture b/l legs  S/P cataract surgery right  H/O shoulder surgery      Allergies  No Known Allergies      FAMILY HISTORY:  FH: type 2 diabetes      Social history: non smoker     Review of Systems: unable to fully obtain, pt very lethargic       Medications:  MEDICATIONS  (STANDING):  albuterol/ipratropium for Nebulization 3 milliLiter(s) Nebulizer every 6 hours  atorvastatin 40 milliGRAM(s) Oral at bedtime  benzonatate 100 milliGRAM(s) Oral three times a day  calcium acetate 2001 milliGRAM(s) Oral three times a day with meals  chlorhexidine 4% Liquid 1 Application(s) Topical <User Schedule>  epoetin aleah-epbx (RETACRIT) Injectable 49142 Unit(s) IV Push <User Schedule>  guaiFENesin ER 1200 milliGRAM(s) Oral every 12 hours  heparin   Injectable 5000 Unit(s) SubCutaneous every 8 hours  hydrOXYzine hydrochloride 25 milliGRAM(s) Oral two times a day  ketoconazole 2% Shampoo 1 Application(s) Topical <User Schedule>  oxyCODONE  ER Tablet 30 milliGRAM(s) Oral every 12 hours  pantoprazole    Tablet 40 milliGRAM(s) Oral before breakfast  piperacillin/tazobactam IVPB.. 3.375 Gram(s) IV Intermittent every 12 hours    MEDICATIONS  (PRN):  acetaminophen     Tablet .. 650 milliGRAM(s) Oral every 6 hours PRN Temp greater or equal to 38C (100.4F), Mild Pain (1 - 3)  HYDROmorphone  Injectable 2 milliGRAM(s) IV Push every 6 hours PRN Severe Pain (7 - 10)  oxyCODONE    IR 10 milliGRAM(s) Oral every 6 hours PRN moderate breakthrough Pain  sodium chloride 0.9% lock flush 10 milliLiter(s) IV Push every 1 hour PRN Pre/post blood products, medications, blood draw, and to maintain line patency            Vital Signs Last 24 Hrs  T(C): 36.9 (22 Feb 2025 08:08), Max: 37.8 (21 Feb 2025 23:19)  T(F): 98.5 (22 Feb 2025 08:08), Max: 100 (21 Feb 2025 23:19)  HR: 74 (22 Feb 2025 08:08) (74 - 95)  BP: 122/72 (22 Feb 2025 08:08) (98/68 - 127/67)  BP(mean): --  RR: 18 (22 Feb 2025 08:08) (17 - 18)  SpO2: 95% (22 Feb 2025 08:08) (93% - 95%)    Parameters below as of 22 Feb 2025 08:08  Patient On (Oxygen Delivery Method): room air                02-21 @ 07:01  -  02-22 @ 07:00  --------------------------------------------------------  IN: 0 mL / OUT: 295 mL / NET: -295 mL          LABS:                        8.3    8.51  )-----------( 229      ( 22 Feb 2025 07:20 )             27.7     02-22    134[L]  |  95[L]  |  27[H]  ----------------------------<  144[H]  4.2   |  24  |  4.99[H]    Ca    8.3[L]      22 Feb 2025 07:03    TPro  5.7[L]  /  Alb  2.5[L]  /  TBili  0.2  /  DBili  x   /  AST  11  /  ALT  5[L]  /  AlkPhos  96  02-22          CAPILLARY BLOOD GLUCOSE      POCT Blood Glucose.: 189 mg/dL (22 Feb 2025 06:42)      Urinalysis Basic - ( 22 Feb 2025 07:03 )    Color: x / Appearance: x / SG: x / pH: x  Gluc: 144 mg/dL / Ketone: x  / Bili: x / Urobili: x   Blood: x / Protein: x / Nitrite: x   Leuk Esterase: x / RBC: x / WBC x   Sq Epi: x / Non Sq Epi: x / Bacteria: x                    CULTURES:      (collected 02-20-25 @ 01:55)  Source: .Blood Blood-Peripheral  Preliminary Report (02-22-25 @ 04:01):    No growth at 48 Hours                      Physical Examination:    General: No acute distress.      HEENT: Pupils equal, reactive to light.  Symmetric.    PULM: decreased BS    CVS: S1, S2    ABD: Soft, nondistended, nontender, normoactive bowel sounds, no masses    EXT: +2 bilateral LE edema     SKIN: Warm and well perfused, no rashes noted.    NEURO: lethargic, oriented x1    RADIOLOGY REVIEWED  CXR 2/20: elevated R hemidiaphragm

## 2025-02-22 NOTE — PROGRESS NOTE ADULT - ASSESSMENT
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 He is well known to me from multiple recent admisions  s/p admissions at [facility] 12/20-12/25/24 and 2/4-2/7 with SONDRA on CKD with associated uremic symptoms.   At the last admission he had expressed strong interest to try to hold off with HD intiation but he has been getting worse clinically at home.  His SONDRA and uremic symptoms significantly improved after the first admission; they improved a to mild extent during the 2nd admission to the point where he was able to be discharged without HD. Since then, however, he has worsened further.   He has been suffering from progressively worsening diffuse pruritus, loss of appetite, and generalized weakness and falls.   His nephrologist and PCP has been speaking with him and his family over the past few days,   The initial plan was that he would present to the [facility] ER Monday 2/17 (ie tomorrow) for HD initiation. Today, however, he fell and sustained trauma to his knee. Given the fall and concern for knee fracture, he presented to the ER today. multiple xrays show no Fractures.      CKD5, uremia, requiring initiation of HD  Rash, seborrheic dermatitis  Anemia  PAD  SP catheter, chronic    plan  - HD via [facility]     - 2/22: ptn is drowsy but arousable, calmer today, answers questions appropriately. he is pain free, he stopped coughing, he denies having pruritis: will DC:  OXY ER, Atarax, Tessalon perles.     -  2/21: ptn is tearful, a bit confused, coughing, recognizes me and family at bedside. GOC d/w daughter and wife. AMS prob delirium 2/2 acute illness +/- opiods, lyrica, atarac. will lower atarak to tid, dc lyrica, cont Oxy 2/2 ptn has severe LE pain. neuro called for eval. Head ct done yesterday w no acute findings. CTA A/P w LE run fof: severe PAD, vascular to follow up on plan fo care. plan for HD tomorrow and next week place on tiw schedule of MWF. will need tunneled catheter prior to DC and will d/w vascular scheduling of AVF. ptn wants all to be done while inptn. daughter wants to be HCP as per ptn's wishes. she was given paperwork to get it signed and witnessed and will present to nursing thereafter. details of findings and complaints and plan of care d/w daughter and wife. spent 60 min. RVP ordered. start mucinex , tessalon perles, duonebs, get CT chest to r/o PNA. pulm called    -  2/20:  ptn had RRT 2/2 AMS and tremors. no SZ, no LOC. noted to have Na 123( hyponatremia), given 500 cc NS. Gabapentin DCed. ptn had been taking gabapentin at home PTA. Pain is controlled. Pruritis resolved, tolerating HD otherwise.     - Pain management:  cont Oxycodone 10 IR q6H,  DIlaudid prn severe pain 2 mg q4H prn.   - cont outptn meds  - DVT ppx w [facility]

## 2025-02-23 NOTE — PROGRESS NOTE ADULT - PROBLEM SELECTOR PLAN 3
-Neuro following  -ABG 2/20 acceptable  -Pt lethargic 2/22, ABG never sent but much more alert today on exam  -Check VBG in AM.

## 2025-02-23 NOTE — PROGRESS NOTE ADULT - ASSESSMENT
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  came in after fall and for HD.     2/20 CTH neg   \Na 123 --> now 133   A1c 5.7   TSH WNL 3.46   o/e 2/21 AAOx2, uppers 4/5, lowers limited .  2/23; family bedside upset that he has pain in leg after he was moved.  patient going for imaging now.     Imrpssion  1) AMS, waxing and waing , likely multifactorial from infection, metabolic/electrolyte derrangements/ delerium / medication effect , ureemia which is imporving   2) polio  3) fall 2/2 weakness  4) hyponatremia to 123 2/20 now imrpoved 133       - was on zosyn for infection   - lyrica 25mg BID; stopped   - was getting oxycodone ER 30mg BID and oxy PRN IR i10 and dilauded PRN ; now just getting PRNs   - f/u psych   - limit sedating meds   - continue to monitor and correct metabolic derrangements .  - delerium precuations.   - frequent re orientation  - check b12, RPR, ammonia level   - will consdier MRI brain or EEG if doesn't improve but seems to have been improving   - PT/OT   - check FS, glucose control <180  - GI/DVT ppx  - Thank you for allowing me to participate in the care of this patient. Call with questions.   - spoke with wife and nephrology 2/21;  spoke to family bedside 2/23 AM   Paul Limon MD  Vascular Neurology  Office: [phone]

## 2025-02-23 NOTE — PROGRESS NOTE ADULT - SUBJECTIVE AND OBJECTIVE BOX
Neurology      S; patient seen. family at bedside upset about his pain after he was moved.        Medications: MEDICATIONS  (STANDING):  albuterol/ipratropium for Nebulization 3 milliLiter(s) Nebulizer every 6 hours  atorvastatin 40 milliGRAM(s) Oral at bedtime  calcium acetate 2001 milliGRAM(s) Oral three times a day with meals  chlorhexidine 4% Liquid 1 Application(s) Topical <User Schedule>  epoetin aleah-epbx (RETACRIT) Injectable 46673 Unit(s) IV Push <User Schedule>  guaiFENesin ER 1200 milliGRAM(s) Oral every 12 hours  heparin   Injectable 5000 Unit(s) SubCutaneous every 8 hours  ketoconazole 2% Shampoo 1 Application(s) Topical <User Schedule>  pantoprazole    Tablet 40 milliGRAM(s) Oral before breakfast    MEDICATIONS  (PRN):  acetaminophen     Tablet .. 650 milliGRAM(s) Oral every 6 hours PRN Temp greater or equal to 38C (100.4F), Mild Pain (1 - 3)  HYDROmorphone  Injectable 2 milliGRAM(s) IV Push every 6 hours PRN Severe Pain (7 - 10)  oxyCODONE    IR 10 milliGRAM(s) Oral every 6 hours PRN moderate breakthrough Pain  sodium chloride 0.9% lock flush 10 milliLiter(s) IV Push every 1 hour PRN Pre/post blood products, medications, blood draw, and to maintain line patency       Vitals:  Vital Signs Last 24 Hrs  T(C): 37.1 (23 Feb 2025 08:55), Max: 37.1 (23 Feb 2025 08:55)  T(F): 98.8 (23 Feb 2025 08:55), Max: 98.8 (23 Feb 2025 08:55)  HR: 95 (23 Feb 2025 08:55) (85 - 95)  BP: 129/55 (23 Feb 2025 08:55) (124/64 - 129/55)  BP(mean): --  RR: 18 (23 Feb 2025 08:55) (18 - 18)  SpO2: 93% (23 Feb 2025 08:55) (93% - 95%)    Parameters below as of 23 Feb 2025 08:55  Patient On (Oxygen Delivery Method): room air              General Exam:   General Appearance: Appropriately dressed and in no acute distress       Head: Normocephalic, atraumatic and no dysmorphic features  Ear, Nose, and Throat: Moist mucous membranes  CVS: S1S2+  Resp: No SOB, no wheeze or rhonchi  GI: soft NT/ND  Extremities: LE PVD   Skin: No bruises or rashes     Neurological Exam:  Mental Status: Awake, alert and oriented x 2.  Able to follow simple and complex verbal commands. Able to name and repeat. fluent speech. No obvious aphasia or dysarthria noted.   Cranial Nerves: PERRL, EOMI, VFFC, sensation V1-V3 intact,  no obvious facial asymmetry, equal elevation of palate, scm/trap 5/5, tongue is midline on protrusion. no obvious papilledema on fundoscopic exam. hearing is grossly intact.   Motor: Normal bulk, tone and strength throughout uppers 4/ lowers 0-/1 5   Sensation: Intact to light touch and pinprick throughout.     Coordination: No dysmetria on FNF   Gait: deferred, wheelchair bound     Data/Labs/Imaging which I personally reviewed.     Labs:       LABS:                          8.7    7.68  )-----------( 201      ( 23 Feb 2025 07:55 )             29.0     02-23    135  |  98  |  19  ----------------------------<  84  4.2   |  24  |  4.16[H]    Ca    8.4      23 Feb 2025 07:55    TPro  5.7[L]  /  Alb  2.5[L]  /  TBili  0.2  /  DBili  x   /  AST  11  /  ALT  5[L]  /  AlkPhos  96  02-22    LIVER FUNCTIONS - ( 22 Feb 2025 07:03 )  Alb: 2.5 g/dL / Pro: 5.7 g/dL / ALK PHOS: 96 U/L / ALT: 5 U/L / AST: 11 U/L / GGT: x             Urinalysis Basic - ( 23 Feb 2025 07:55 )    Color: x / Appearance: x / SG: x / pH: x  Gluc: 84 mg/dL / Ketone: x  / Bili: x / Urobili: x   Blood: x / Protein: x / Nitrite: x   Leuk Esterase: x / RBC: x / WBC x   Sq Epi: x / Non Sq Epi: x / Bacteria: x        < from: CT Head No Cont (02.20.25 @ 18:47) >    ACC: [id number] EXAM:  CT BRAIN   ORDERED BY: GUY DE LA CRUZ     PROCEDURE DATE:  02/20/2025          INTERPRETATION:  CLINICAL INDICATION: Altered mental status    TECHNIQUE: Axial CT scanning of the brain was obtained from the skull   base to the vertex without the administration of intravenous contrast.   Reformatted coronal and sagittal images were subsequently obtained and   reviewed.    COMPARISON: None    FINDINGS:  There is no CT evidence of acute transcortical infarct. Age-related   involutional changes and chronic microvascular ischemic changes.    There is no hydrocephalus, mass effect, or acute intracranial hemorrhage.   No extra-axial collection. Basal cisterns are patent.    The visualized paranasal sinuses and mastoid air cells are clear.    The calvarium is intact.    IMPRESSION:  No evidence of acute transcortical infarct, acute intracranial   hemorrhage, or mass effect.    --- End of Report ---            CORTNEY REARDON MD; Attending Radiologist  This document has been electronically signed. Feb 20 2025  7:07PM    < end of copied text >

## 2025-02-23 NOTE — PROGRESS NOTE ADULT - ASSESSMENT
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 He is well known to me from multiple recent admisions  s/p admissions at [facility] 12/20-12/25/24 and 2/4-2/7 with SONDRA on CKD with associated uremic symptoms.   At the last admission he had expressed strong interest to try to hold off with HD intiation but he has been getting worse clinically at home.  His SONDRA and uremic symptoms significantly improved after the first admission; they improved a to mild extent during the 2nd admission to the point where he was able to be discharged without HD. Since then, however, he has worsened further.   He has been suffering from progressively worsening diffuse pruritus, loss of appetite, and generalized weakness and falls.   His nephrologist and PCP has been speaking with him and his family over the past few days,   The initial plan was that he would present to the [facility] ER Monday 2/17 (ie tomorrow) for HD initiation. Today, however, he fell and sustained trauma to his knee. Given the fall and concern for knee fracture, he presented to the ER today. multiple xrays show no Fractures.      CKD5, uremia, requiring initiation of HD  Rash, seborrheic dermatitis  Pruritis  Anemia  PAD  SP catheter, chronic  Left inferior pole acute on chronic patella Fx    plan  - HD via [facility]   -2/23: Daughter Cori is the HCP, she and the ptn filled out the paperwork. daily plan and findings d/w her and the ptn. MS is at abseline, c/o b/l LE foot pain and Left Knee pain. xrays of left knee: cannot r/o acute on chronic inferior pole patellar Fx. knee immobilizer is on, awaiting ortho consult. doubt needs any intervention awaiting Ct chest today, will add CT Left knee. ptn states itching has recurred, severe pain has recurred. will resume Atatrax ( 25 mg bid) and Oxycodone ER , but at a lower dose 15 mg q12H. cont prn analgesics. HD as per renal, awaiting Vascular consult f/u re CTA A/L &LE and recs. ptn also wants AVF surgery on this admission. Coughing has stopped    - 2/22: ptn is drowsy but arousable, calmer today, answers questions appropriately. he is pain free, he stopped coughing, he denies having pruritis: will DC:  OXY ER, Atarax, Tessalon perles.     -  2/21: ptn is tearful, a bit confused, coughing, recognizes me and family at bedside. GOC d/w daughter and wife. AMS prob delirium 2/2 acute illness +/- opiods, lyrica, atarac. will lower atarak to tid, dc lyrica, cont Oxy 2/2 ptn has severe LE pain. neuro called for eval. Head ct done yesterday w no acute findings. CTA A/P w LE run fof: severe PAD, vascular to follow up on plan fo care. plan for HD tomorrow and next week place on tiw schedule of MWF. will need tunneled catheter prior to DC and will d/w vascular scheduling of AVF. ptn wants all to be done while inptn. daughter wants to be HCP as per ptn's wishes. she was given paperwork to get it signed and witnessed and will present to nursing thereafter. details of findings and complaints and plan of care d/w daughter and wife. spent 60 min. RVP ordered. start mucinex , tessalon perles, duonebs, get CT chest to r/o PNA. pulm called    -  2/20:  ptn had RRT 2/2 AMS and tremors. no SZ, no LOC. noted to have Na 123( hyponatremia), given 500 cc NS. Gabapentin DCed. ptn had been taking gabapentin at home PTA. Pain is controlled. Pruritis resolved, tolerating HD otherwise.     - Pain management:  cont Oxycodone 10 IR q6H,  DIlaudid prn severe pain 2 mg q4H prn.   - cont outptn meds  - DVT ppx w [facility]SC

## 2025-02-23 NOTE — CONSULT NOTE ADULT - ASSESSMENT
ASSESSMENT & PLAN  74yMale w/ Nondispalced L medial femoral condyle fx s/p immobilization in KI    -NWB LLE in a KI at all times, no bending knee  - Pain control  -ice/cold compress, elevation    Please have patient FU with Dr. Locke in office 1 week after discharge, call office to make appointment    For all questions related to patient care, please reach out to the on-call team via the pager.     Debra Spears, PGY 3  Orthopaedic Surgery  [facility] [id number]  [facility] [place] [id number]  [facility] [phone]

## 2025-02-23 NOTE — PROGRESS NOTE ADULT - ASSESSMENT
IMPRESSION: 74M w/ polio, neurogenic bladder/suprapubic catheter, iatrogenic rectal rupture requiring colostomy 2/2023, and CKD5, 2/16/25 admitted with uremia and s/p fall; now newly ESRD-HD    (1)Renal - newly ESRD-HD as of this admission; last dialyzed yesterday    (2)Hyponatremia - improved, s/p HD with high-Na+ bath    (3)Vascular access - nontunneled catheter as access for now; ideally an AV access could be placed this admission. He will also need conversion of shiley to tunneled cath by IR prior to discharge    (4)Hyperphosphatemia - improved as of late, now that he is on HD and on Phoslo    (5)Anemia - s/p IV iron; on Retacrit with HD    (6)ID - low-grade fever - unclear etiology - ID on board - s/p Zosyn/Vanco IV     (6)Neuro - unclear exact etiology - multifactorial with infection     (7)PAD - significant disease on CTA - Vascular on board    (8)Dispo - would like to get him accepted to [facility] in [place] for in-center HD for now. Eventually, we would look to try to transition him to home HD.    RECOMMEND:  (1)HD tomorrow   (2)Abx per primary team/ID, dose for GFR<10/HD  (3)Vascular f/u regarding PAD management as well as eventual AV access  (4)Eventual tunneled cath with IR once infectious illness/mental status improved  (5)Efforts to have patient accepted to [facility] for outside HD

## 2025-02-23 NOTE — CONSULT NOTE ADULT - SUBJECTIVE AND OBJECTIVE BOX
HPI  74yMale w/ PMH of polio (no motor in LLE), HTN, CKD on HD c/o L knee pain s/p mechanical fall Feb 16th. Uses a wheelchair at baseline, but has had continued pain in L knee since fall. Denies numbness/tingling in the LLE.     ROS  Negative unless otherwise specified in HPI.    PAST MEDICAL & SURGICAL Hx  PAST MEDICAL & SURGICAL HISTORY:  Polio      Hypertension      H/O urinary retention  suprapubic tube      HLD (hyperlipidemia)      BPH with obstruction/lower urinary tract symptoms      Erectile dysfunction      Bladder outlet obstruction      Presence of suprapubic catheter      Colostomy status      DVT, lower extremity      H/O cardiac murmur      S/P colostomy  2/2024      Suprapubic catheter      S/P ORIF (open reduction internal fixation) fracture  b/l legs      S/P cataract surgery  right      H/O shoulder surgery          MEDICATIONS  Home Medications:  acetaminophen 325 mg oral tablet: 2 tab(s) orally every 6 hours As needed Temp greater or equal to 38C (100.4F), Mild Pain (1 - 3) (16 Feb 2025 19:45)  atorvastatin 40 mg oral tablet: 1 tab(s) orally once a day (at bedtime) (16 Feb 2025 19:45)      ALLERGIES  No Known Allergies      FAMILY Hx  FAMILY HISTORY:  FH: type 2 diabetes        SOCIAL Hx  Social History:      VITALS  Vital Signs Last 24 Hrs  T(C): 37 (23 Feb 2025 15:52), Max: 37.1 (23 Feb 2025 08:55)  T(F): 98.6 (23 Feb 2025 15:52), Max: 98.8 (23 Feb 2025 08:55)  HR: 88 (23 Feb 2025 15:52) (88 - 95)  BP: 120/70 (23 Feb 2025 15:52) (120/70 - 129/55)  BP(mean): --  RR: 18 (23 Feb 2025 15:52) (18 - 18)  SpO2: 98% (23 Feb 2025 15:52) (93% - 98%)    Parameters below as of 23 Feb 2025 15:52  Patient On (Oxygen Delivery Method): room air        PHYSICAL EXAM  Gen: Lying in bed, NAD  Resp: No increased WOB  LLE:  small superficial abrasion, +edema and +ecchymosis over L knee  +TTP over L knee, no TTP along remainder of extremity; compartments soft  Limited ROM at knee 2/2 pain  No gross varus/valgus laxity, but assessment limited 2/2 pain  Motor: TA/EHL/GS/FHL intact  Sensory: DP/SP/Tib/Jordan/Saph SILT  +DP pulse (symmetric relative to contralateral side), WWP    Secondary survey:  No TTP along spine or other extremities, SILT and compartments soft throughout    LABS                        8.7    7.68  )-----------( 201      ( 23 Feb 2025 07:55 )             29.0     02-23    135  |  98  |  19  ----------------------------<  84  4.2   |  24  |  4.16[H]    Ca    8.4      23 Feb 2025 07:55    TPro  5.7[L]  /  Alb  2.5[L]  /  TBili  0.2  /  DBili  x   /  AST  11  /  ALT  5[L]  /  AlkPhos  96  02-22        IMAGING  XRs: negative for any fracture    CT: Nondispalced L medial femoral condyle fx

## 2025-02-23 NOTE — PROGRESS NOTE ADULT - ASSESSMENT
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 The initial plan was that he would present to the [facility] ER Monday 2/17 for HD initiation. However, day of admission, he fell and sustained trauma to his knee. Called to consult for cough, elevated R hemidiaphragm.

## 2025-02-23 NOTE — PROGRESS NOTE ADULT - SUBJECTIVE AND OBJECTIVE BOX
NEPHROLOGY     Patient seen and examined resting comfortably on room air, reports of mild knee pain, no sob, tolerated HD yesterday no fluid removed, currently in no acute distress.     MEDICATIONS  (STANDING):  albuterol/ipratropium for Nebulization 3 milliLiter(s) Nebulizer every 6 hours  atorvastatin 40 milliGRAM(s) Oral at bedtime  calcium acetate 2001 milliGRAM(s) Oral three times a day with meals  chlorhexidine 4% Liquid 1 Application(s) Topical <User Schedule>  epoetin aleah-epbx (RETACRIT) Injectable 17260 Unit(s) IV Push <User Schedule>  guaiFENesin ER 1200 milliGRAM(s) Oral every 12 hours  heparin   Injectable 5000 Unit(s) SubCutaneous every 8 hours  ketoconazole 2% Shampoo 1 Application(s) Topical <User Schedule>  pantoprazole    Tablet 40 milliGRAM(s) Oral before breakfast    VITALS:  T(C): , Max: 37.1 (02-23-25 @ 08:55)  T(F): , Max: 98.8 (02-23-25 @ 08:55)  HR: 95 (02-23-25 @ 08:55)  BP: 129/55 (02-23-25 @ 08:55)  BP(mean): --  RR: 18 (02-23-25 @ 08:55)  SpO2: 93% (02-23-25 @ 08:55)  Wt(kg): --  I and O's:    02-22 @ 07:01  -  02-23 @ 07:00  --------------------------------------------------------  IN: 0 mL / OUT: 800 mL / NET: -800 mL    PHYSICAL EXAM:  Constitutional: alert, nad   HEENT: NCAT, DMM  Neck: Supple, No JVD  Respiratory: CTA-b/l  Cardiovascular: RRR s1s2, no m/r/g  Gastrointestinal: BS+, soft, NT/ND  Extremities: No peripheral edema b/l  Neurological: no focal deficits; strength grossly intact  Back: no CVAT b/l  Skin: No rashes, no nevi  Access: [facility]    LABS:                        8.7    7.68  )-----------( 201      ( 23 Feb 2025 07:55 )             29.0     02-23    135  |  98  |  19  ----------------------------<  84  4.2   |  24  |  4.16[H]    Ca    8.4      23 Feb 2025 07:55    TPro  5.7[L]  /  Alb  2.5[L]  /  TBili  0.2  /  DBili  x   /  AST  11  /  ALT  5[L]  /  AlkPhos  96  02-22

## 2025-02-23 NOTE — PROGRESS NOTE ADULT - SUBJECTIVE AND OBJECTIVE BOX
Date of Service: 02-23-25 @ 11:14    Patient is a 74y old  Male who presents with a chief complaint of ESRD requiring HD, uremia (23 Feb 2025 09:35)      Any change in ROS:   No new respiratory events overnight. Denies SOB/CP.      MEDICATIONS  (STANDING):  albuterol/ipratropium for Nebulization 3 milliLiter(s) Nebulizer every 6 hours  atorvastatin 40 milliGRAM(s) Oral at bedtime  calcium acetate 2001 milliGRAM(s) Oral three times a day with meals  chlorhexidine 4% Liquid 1 Application(s) Topical <User Schedule>  epoetin aleah-epbx (RETACRIT) Injectable 73602 Unit(s) IV Push <User Schedule>  guaiFENesin ER 1200 milliGRAM(s) Oral every 12 hours  heparin   Injectable 5000 Unit(s) SubCutaneous every 8 hours  ketoconazole 2% Shampoo 1 Application(s) Topical <User Schedule>  pantoprazole    Tablet 40 milliGRAM(s) Oral before breakfast    MEDICATIONS  (PRN):  acetaminophen     Tablet .. 650 milliGRAM(s) Oral every 6 hours PRN Temp greater or equal to 38C (100.4F), Mild Pain (1 - 3)  HYDROmorphone  Injectable 2 milliGRAM(s) IV Push every 6 hours PRN Severe Pain (7 - 10)  oxyCODONE    IR 10 milliGRAM(s) Oral every 6 hours PRN moderate breakthrough Pain  sodium chloride 0.9% lock flush 10 milliLiter(s) IV Push every 1 hour PRN Pre/post blood products, medications, blood draw, and to maintain line patency    Vital Signs Last 24 Hrs  T(C): 37.1 (23 Feb 2025 08:55), Max: 37.1 (23 Feb 2025 08:55)  T(F): 98.8 (23 Feb 2025 08:55), Max: 98.8 (23 Feb 2025 08:55)  HR: 95 (23 Feb 2025 08:55) (85 - 95)  BP: 129/55 (23 Feb 2025 08:55) (124/64 - 129/55)  BP(mean): --  RR: 18 (23 Feb 2025 08:55) (18 - 18)  SpO2: 93% (23 Feb 2025 08:55) (93% - 95%)    Parameters below as of 23 Feb 2025 08:55  Patient On (Oxygen Delivery Method): room air        I&O's Summary    22 Feb 2025 07:01  -  23 Feb 2025 07:00  --------------------------------------------------------  IN: 0 mL / OUT: 800 mL / NET: -800 mL          Physical Exam:   GENERAL: NAD, well-groomed, well-developed  HEENT: VINNY/   Atraumatic, Normocephalic  ENMT: No tonsillar erythema, exudates, or enlargement; Moist mucous membranes, Good dentition, No lesions  NECK: Supple, No JVD, Normal thyroid  CHEST/LUNG: Decreased R base  CVS: Regular rate and rhythm; No murmurs, rubs, or gallops  GI: : Soft, Nontender, Nondistended; Bowel sounds present  NERVOUS SYSTEM:  Alert & Oriented X3  EXTREMITIES:  2+ Peripheral Pulses, No clubbing, cyanosis, or edema  LYMPH: No lymphadenopathy noted  SKIN: No rashes or lesions  ENDOCRINOLOGY: No Thyromegaly  PSYCH: Appropriate    Labs:  27                            8.7    7.68  )-----------( 201      ( 23 Feb 2025 07:55 )             29.0                         8.3    8.51  )-----------( 229      ( 22 Feb 2025 07:20 )             27.7                         8.7    8.36  )-----------( 214      ( 21 Feb 2025 07:17 )             29.5                         8.4    7.99  )-----------( 204      ( 20 Feb 2025 02:05 )             28.1     02-23    135  |  98  |  19  ----------------------------<  84  4.2   |  24  |  4.16[H]  02-22    134[L]  |  95[L]  |  27[H]  ----------------------------<  144[H]  4.2   |  24  |  4.99[H]  02-21    133[L]  |  94[L]  |  17  ----------------------------<  91  3.8   |  25  |  3.39[H]  02-20    123[L]  |  88[L]  |  22  ----------------------------<  117[H]  3.7   |  25  |  3.21[H]    Ca    8.4      23 Feb 2025 07:55  Ca    8.3[L]      22 Feb 2025 07:03    TPro  5.7[L]  /  Alb  2.5[L]  /  TBili  0.2  /  DBili  x   /  AST  11  /  ALT  5[L]  /  AlkPhos  96  02-22  TPro  5.6[L]  /  Alb  2.4[L]  /  TBili  0.4  /  DBili  x   /  AST  8[L]  /  ALT  <5[L]  /  AlkPhos  88  02-21  TPro  5.5[L]  /  Alb  2.5[L]  /  TBili  0.4  /  DBili  x   /  AST  7[L]  /  ALT  <5[L]  /  AlkPhos  88  02-20    CAPILLARY BLOOD GLUCOSE          LIVER FUNCTIONS - ( 22 Feb 2025 07:03 )  Alb: 2.5 g/dL / Pro: 5.7 g/dL / ALK PHOS: 96 U/L / ALT: 5 U/L / AST: 11 U/L / GGT: x             Urinalysis Basic - ( 23 Feb 2025 07:55 )    Color: x / Appearance: x / SG: x / pH: x  Gluc: 84 mg/dL / Ketone: x  / Bili: x / Urobili: x   Blood: x / Protein: x / Nitrite: x   Leuk Esterase: x / RBC: x / WBC x   Sq Epi: x / Non Sq Epi: x / Bacteria: x            RECENT CULTURES:  02-20 @ 01:55 .Blood Blood-Peripheral                No growth at 72 Hours        Studies  < from: Xray Chest 1 View- PORTABLE-Urgent (Xray Chest 1 View- PORTABLE-Urgent .) (02.20.25 @ 03:43) >    ACC: [id number] EXAM:  XR CHEST PORTABLE ROUTINE 1V   ORDERED BY: SUSAN FLANAGAN     ACC: [id number] EXAM:  XR CHEST PORTABLE URGENT 1V   ORDERED BY:  STEVEN FRENCH     PROCEDURE DATE:  02/20/2025          INTERPRETATION:  EXAMINATION: XR CHEST URGENT, XR CHEST    CLINICAL INDICATION: rrt    TECHNIQUE: Single frontal, portable view of the chest was obtained.    COMPARISON: Chest x-ray 2/17/2025    FINDINGS:    Chest x-ray 2/18/2025 4:31 PM  Right IJ central venous catheter terminating in the SVC.  The heart is normal in size.  Elevated right hemidiaphragm. The lungs are clear. There is no   pneumothorax or pleural effusion.    2/20/2025 1:44 AM  Similar findings as mentioned above.    IMPRESSION:  Clear lungs    --- End of Report ---    < end of copied text >

## 2025-02-23 NOTE — PROGRESS NOTE ADULT - SUBJECTIVE AND OBJECTIVE BOX
Patient is a 74y old  Male who presents with a chief complaint of ESRD requiring HD, uremia (23 Feb 2025 11:17)      SUBJECTIVE / OVERNIGHT EVENTS: MS is at abseline, c/o b/l LE foot pain and Left Knee pain. xrays of left knee: cannot r/o acute on chronic inferior pole patellar Fx. knee immobilizer is on, awaiting ortho consult. doubt needs any intervention awaiting Ct chest today, will add CT Left knee. ptn states itching has recurred, severe pain has recurred. will resume Atatrax ( 25 mg bid) and Oxycodone ER , but at a lower dose 15 mg q12H. cont prn analgesics. HD as per renal, awaiting Vascular consult f/u re CTA A/L &LE and recs. ptn also wants AVF surgery on this admission.     MEDICATIONS  (STANDING):  albuterol/ipratropium for Nebulization 3 milliLiter(s) Nebulizer every 6 hours  atorvastatin 40 milliGRAM(s) Oral at bedtime  calcium acetate 2001 milliGRAM(s) Oral three times a day with meals  chlorhexidine 4% Liquid 1 Application(s) Topical <User Schedule>  epoetin aleah-epbx (RETACRIT) Injectable 77261 Unit(s) IV Push <User Schedule>  guaiFENesin ER 1200 milliGRAM(s) Oral every 12 hours  heparin   Injectable 5000 Unit(s) SubCutaneous every 8 hours  hydrOXYzine hydrochloride 25 milliGRAM(s) Oral two times a day  ketoconazole 2% Shampoo 1 Application(s) Topical <User Schedule>  oxyCODONE  ER Tablet 15 milliGRAM(s) Oral every 12 hours  pantoprazole    Tablet 40 milliGRAM(s) Oral before breakfast    MEDICATIONS  (PRN):  acetaminophen     Tablet .. 650 milliGRAM(s) Oral every 6 hours PRN Temp greater or equal to 38C (100.4F), Mild Pain (1 - 3)  HYDROmorphone  Injectable 2 milliGRAM(s) IV Push every 6 hours PRN Severe Pain (7 - 10)  oxyCODONE    IR 10 milliGRAM(s) Oral every 6 hours PRN moderate breakthrough Pain  sodium chloride 0.9% lock flush 10 milliLiter(s) IV Push every 1 hour PRN Pre/post blood products, medications, blood draw, and to maintain line patency      Vital Signs Last 24 Hrs  T(F): 98.8 (02-23-25 @ 08:55), Max: 98.8 (02-23-25 @ 08:55)  HR: 95 (02-23-25 @ 08:55) (85 - 95)  BP: 129/55 (02-23-25 @ 08:55) (124/64 - 129/55)  RR: 18 (02-23-25 @ 08:55) (18 - 18)  SpO2: 93% (02-23-25 @ 08:55) (93% - 95%)  Telemetry:   CAPILLARY BLOOD GLUCOSE        I&O's Summary    22 Feb 2025 07:01  -  23 Feb 2025 07:00  --------------------------------------------------------  IN: 0 mL / OUT: 800 mL / NET: -800 mL    23 Feb 2025 07:01  -  23 Feb 2025 15:21  --------------------------------------------------------  IN: 0 mL / OUT: 350 mL / NET: -350 mL        PHYSICAL EXAM:  GENERAL: NAD, well-developed  HEAD:  Atraumatic, Normocephalic  EYES: EOMI, PERRLA, conjunctiva and sclera clear  NECK: Supple, No JVD  CHEST/LUNG: Clear to auscultation bilaterally; No wheeze  HEART: Regular rate and rhythm; No murmurs, rubs, or gallops  ABDOMEN: Soft, Nontender, Nondistended; Bowel sounds present  EXTREMITIES:  2+ Peripheral Pulses, No clubbing, cyanosis, or edema  PSYCH: AAOx3  NEUROLOGY: non-focal  SKIN: No rashes or lesions    LABS:                        8.7    7.68  )-----------( 201      ( 23 Feb 2025 07:55 )             29.0     02-23    135  |  98  |  19  ----------------------------<  84  4.2   |  24  |  4.16[H]    Ca    8.4      23 Feb 2025 07:55    TPro  5.7[L]  /  Alb  2.5[L]  /  TBili  0.2  /  DBili  x   /  AST  11  /  ALT  5[L]  /  AlkPhos  96  02-22          Urinalysis Basic - ( 23 Feb 2025 07:55 )    Color: x / Appearance: x / SG: x / pH: x  Gluc: 84 mg/dL / Ketone: x  / Bili: x / Urobili: x   Blood: x / Protein: x / Nitrite: x   Leuk Esterase: x / RBC: x / WBC x   Sq Epi: x / Non Sq Epi: x / Bacteria: x        RADIOLOGY & ADDITIONAL TESTS:    Imaging Personally Reviewed:    Consultant(s) Notes Reviewed:      Care Discussed with Consultants/Other Providers:

## 2025-02-23 NOTE — PROGRESS NOTE ADULT - SUBJECTIVE AND OBJECTIVE BOX
Subjective: Patient seen and examined. No new events except as noted.     REVIEW OF SYSTEMS:    CONSTITUTIONAL:+ weakness, fevers or chills  EYES/ENT: No visual changes;  No vertigo or throat pain   NECK: No pain or stiffness  RESPIRATORY: No cough, wheezing, hemoptysis; No shortness of breath  CARDIOVASCULAR: No chest pain or palpitations  GASTROINTESTINAL: No abdominal or epigastric pain. No nausea, vomiting, or hematemesis; No diarrhea or constipation. No melena or hematochezia.  GENITOURINARY: No dysuria, frequency or hematuria  NEUROLOGICAL: No numbness or weakness  SKIN: No itching, burning, rashes, or lesions   All other review of systems is negative unless indicated above.    MEDICATIONS:  MEDICATIONS  (STANDING):  albuterol/ipratropium for Nebulization 3 milliLiter(s) Nebulizer every 6 hours  atorvastatin 40 milliGRAM(s) Oral at bedtime  calcium acetate 2001 milliGRAM(s) Oral three times a day with meals  chlorhexidine 4% Liquid 1 Application(s) Topical <User Schedule>  epoetin aleah-epbx (RETACRIT) Injectable 83074 Unit(s) IV Push <User Schedule>  guaiFENesin ER 1200 milliGRAM(s) Oral every 12 hours  heparin   Injectable 5000 Unit(s) SubCutaneous every 8 hours  ketoconazole 2% Shampoo 1 Application(s) Topical <User Schedule>  pantoprazole    Tablet 40 milliGRAM(s) Oral before breakfast      PHYSICAL EXAM:  T(C): 36.7 (02-22-25 @ 15:42), Max: 36.7 (02-22-25 @ 15:42)  HR: 85 (02-22-25 @ 15:42) (85 - 88)  BP: 124/64 (02-22-25 @ 15:42) (109/56 - 124/64)  RR: 18 (02-22-25 @ 15:42) (18 - 18)  SpO2: 95% (02-22-25 @ 15:42) (92% - 95%)  Wt(kg): --  I&O's Summary    22 Feb 2025 07:01  -  23 Feb 2025 07:00  --------------------------------------------------------  IN: 0 mL / OUT: 800 mL / NET: -800 mL            Appearance: Normal	  HEENT:   Normal oral mucosa, PERRL, EOMI	  Lymphatic: No lymphadenopathy  Cardiovascular: Normal S1 S2, No JVD, No murmurs, No edema  Respiratory: Lungs clear to auscultation	  Psychiatry: A & O x 3, Mood & affect appropriate  Skin: No rashes, No ecchymoses, No cyanosis	  Neurologic: Non-focal  GI/Abd: Soft, obese, nontender. Dressing to [facility] C/D/I. Suprapubic catheter in place  Ext: Palp femoral pulses bilat. BLE atrophic, minimal dorsi/plantarflexion bilaterally. Sensation grossly intact. LLE edematous compared to R, erythema to right toes/forefoot. No active wounds bilat.   Vascular: Peripheral pulses palpable 2+ bilaterally        LABS:    CARDIAC MARKERS:                                8.7    7.68  )-----------( 201      ( 23 Feb 2025 07:55 )             29.0     02-23    135  |  98  |  19  ----------------------------<  84  4.2   |  24  |  4.16[H]    Ca    8.4      23 Feb 2025 07:55    TPro  5.7[L]  /  Alb  2.5[L]  /  TBili  0.2  /  DBili  x   /  AST  11  /  ALT  5[L]  /  AlkPhos  96  02-22    proBNP:   Lipid Profile:   HgA1c:   TSH:             TELEMETRY: 	    ECG:  	  RADIOLOGY:   DIAGNOSTIC TESTING:  [ ] Echocardiogram:  [ ]  Catheterization:  [ ] Stress Test:    OTHER:

## 2025-02-23 NOTE — PROGRESS NOTE ADULT - PROBLEM SELECTOR PLAN 1
-CXR with elevated R hemidiaphragm (not present on CXR in December 2024)  -Low grade temp, cough  -F/u CT chest to r/o PNA, pl effusion/atelectasis  -Now monitoring off ABX per ID   -Normoxic, keep sats >90% with o2 PRN.

## 2025-02-24 NOTE — PROGRESS NOTE ADULT - ASSESSMENT
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  came in after fall and for HD.     2/20 CTH neg   \Na 123 --> now 133   A1c 5.7   TSH WNL 3.46   o/e 2/21 AAOx2, uppers 4/5, lowers limited .  2/23; family bedside upset that he has pain in leg after he was moved.  patient going for imaging now.     Imrpssion  1) AMS, waxing and waing , likely multifactorial from infection, metabolic/electrolyte derrangements/ delerium / medication effect , ureemia which is imporving   2) polio  3) fall 2/2 weakness  4) hyponatremia to 123 2/20 now imrpoved 133     - f/u vascular   - was on zosyn for infection ; now off   - lyrica 25mg BID; stopped   - was getting oxycodone ER 30mg BID and oxy PRN IR i10 and dilauded PRN ;   - f/u psych   - limit sedating meds   - continue to monitor and correct metabolic derrangements .  - delerium precuations.   - frequent re orientation  - check b12, RPR, ammonia level   - will consdier MRI brain or EEG if doesn't improve but seems to have been improving   - PT/OT   - check FS, glucose control <180  - GI/DVT ppx  - Thank you for allowing me to participate in the care of this patient. Call with questions.   - spoke with wife and nephrology 2/21;  spoke to family bedside 2/23 AM , 2/24   Paul Limon MD  Vascular Neurology  Office: [phone]

## 2025-02-24 NOTE — PROGRESS NOTE ADULT - PROBLEM SELECTOR PLAN 2
on no current meds Nondisplaced L medial femoral condyle fx   Acceptable cardiac risk to proceed with surgery  ortho following

## 2025-02-24 NOTE — PROGRESS NOTE ADULT - SUBJECTIVE AND OBJECTIVE BOX
Neurology      S; patient seen. family bedside. patient sleeping    MEDICATIONS  (STANDING):  albuterol/ipratropium for Nebulization 3 milliLiter(s) Nebulizer every 6 hours  atorvastatin 40 milliGRAM(s) Oral at bedtime  calcium acetate 1334 milliGRAM(s) Oral three times a day with meals  chlorhexidine 4% Liquid 1 Application(s) Topical <User Schedule>  epoetin aleah-epbx (RETACRIT) Injectable 19846 Unit(s) IV Push <User Schedule>  guaiFENesin ER 1200 milliGRAM(s) Oral every 12 hours  heparin   Injectable 5000 Unit(s) SubCutaneous every 8 hours  hydrOXYzine hydrochloride 25 milliGRAM(s) Oral two times a day  ketoconazole 2% Shampoo 1 Application(s) Topical <User Schedule>  oxyCODONE  ER Tablet 30 milliGRAM(s) Oral every 12 hours  pantoprazole    Tablet 40 milliGRAM(s) Oral before breakfast    MEDICATIONS  (PRN):  acetaminophen     Tablet .. 650 milliGRAM(s) Oral every 6 hours PRN Temp greater or equal to 38C (100.4F), Mild Pain (1 - 3)  HYDROmorphone  Injectable 2 milliGRAM(s) IV Push every 6 hours PRN Severe Pain (7 - 10)  oxyCODONE    IR 10 milliGRAM(s) Oral every 6 hours PRN moderate breakthrough Pain  sodium chloride 0.9% lock flush 10 milliLiter(s) IV Push every 1 hour PRN Pre/post blood products, medications, blood draw, and to maintain line patency      Vital Signs Last 24 Hrs  T(C): 36.5 (02-24-25 @ 08:06), Max: 37 (02-23-25 @ 15:52)  T(F): 97.7 (02-24-25 @ 08:06), Max: 98.6 (02-23-25 @ 15:52)  HR: 71 (02-24-25 @ 08:06) (71 - 88)  BP: 123/71 (02-24-25 @ 08:06) (120/70 - 123/71)  BP(mean): --  RR: 18 (02-24-25 @ 08:06) (18 - 18)  SpO2: 96% (02-24-25 @ 08:06) (96% - 98%)          General Exam:   General Appearance: Appropriately dressed and in no acute distress       Head: Normocephalic, atraumatic and no dysmorphic features  Ear, Nose, and Throat: Moist mucous membranes  CVS: S1S2+  Resp: No SOB, no wheeze or rhonchi  GI: soft NT/ND  Extremities: LE PVD   Skin: No bruises or rashes     Neurological Exam:  Mental Status: Awake, alert and oriented x 2.  Able to follow simple and complex verbal commands. Able to name and repeat. fluent speech. No obvious aphasia or dysarthria noted.   Cranial Nerves: PERRL, EOMI, VFFC, sensation V1-V3 intact,  no obvious facial asymmetry, equal elevation of palate, scm/trap 5/5, tongue is midline on protrusion. no obvious papilledema on fundoscopic exam. hearing is grossly intact.   Motor: Normal bulk, tone and strength throughout uppers 4/ lowers 0-/1 5   Sensation: Intact to light touch and pinprick throughout.     Coordination: No dysmetria on FNF   Gait: deferred, wheelchair bound     Data/Labs/Imaging which I personally reviewed.      LABS:                          8.7    7.68  )-----------( 201      ( 23 Feb 2025 07:55 )             29.0     02-23    135  |  98  |  19  ----------------------------<  84  4.2   |  24  |  4.16[H]    Ca    8.4      23 Feb 2025 07:55          Urinalysis Basic - ( 23 Feb 2025 07:55 )    Color: x / Appearance: x / SG: x / pH: x  Gluc: 84 mg/dL / Ketone: x  / Bili: x / Urobili: x   Blood: x / Protein: x / Nitrite: x   Leuk Esterase: x / RBC: x / WBC x   Sq Epi: x / Non Sq Epi: x / Bacteria: x            < from: CT Head No Cont (02.20.25 @ 18:47) >    ACC: [id number] EXAM:  CT BRAIN   ORDERED BY: GUY DE LA CRUZ     PROCEDURE DATE:  02/20/2025          INTERPRETATION:  CLINICAL INDICATION: Altered mental status    TECHNIQUE: Axial CT scanning of the brain was obtained from the skull   base to the vertex without the administration of intravenous contrast.   Reformatted coronal and sagittal images were subsequently obtained and   reviewed.    COMPARISON: None    FINDINGS:  There is no CT evidence of acute transcortical infarct. Age-related   involutional changes and chronic microvascular ischemic changes.    There is no hydrocephalus, mass effect, or acute intracranial hemorrhage.   No extra-axial collection. Basal cisterns are patent.    The visualized paranasal sinuses and mastoid air cells are clear.    The calvarium is intact.    IMPRESSION:  No evidence of acute transcortical infarct, acute intracranial   hemorrhage, or mass effect.    --- End of Report ---            CORTNEY REARDON MD; Attending Radiologist  This document has been electronically signed. Feb 20 2025  7:07PM    < end of copied text >

## 2025-02-24 NOTE — PROGRESS NOTE ADULT - SUBJECTIVE AND OBJECTIVE BOX
Date of Service: 02-24-25 @ 14:44    Patient is a 74y old  Male who presents with a chief complaint of ESRD requiring HD, uremia (24 Feb 2025 11:22)      Any change in ROS:   No new respiratory events overnight. Denies SOB/CP.      MEDICATIONS  (STANDING):  albuterol/ipratropium for Nebulization 3 milliLiter(s) Nebulizer every 6 hours  atorvastatin 40 milliGRAM(s) Oral at bedtime  calcium acetate 1334 milliGRAM(s) Oral three times a day with meals  chlorhexidine 4% Liquid 1 Application(s) Topical <User Schedule>  epoetin aleah-epbx (RETACRIT) Injectable 48726 Unit(s) IV Push <User Schedule>  guaiFENesin ER 1200 milliGRAM(s) Oral every 12 hours  heparin   Injectable 5000 Unit(s) SubCutaneous every 8 hours  hydrOXYzine hydrochloride 25 milliGRAM(s) Oral two times a day  ibuprofen  Tablet. 400 milliGRAM(s) Oral every 12 hours  ketoconazole 2% Shampoo 1 Application(s) Topical <User Schedule>  oxyCODONE  ER Tablet 30 milliGRAM(s) Oral every 12 hours  pantoprazole    Tablet 40 milliGRAM(s) Oral before breakfast    MEDICATIONS  (PRN):  acetaminophen     Tablet .. 650 milliGRAM(s) Oral every 6 hours PRN Temp greater or equal to 38C (100.4F), Mild Pain (1 - 3)  HYDROmorphone  Injectable 2 milliGRAM(s) IV Push every 6 hours PRN Severe Pain (7 - 10)  oxyCODONE    IR 10 milliGRAM(s) Oral every 6 hours PRN moderate breakthrough Pain  sodium chloride 0.9% lock flush 10 milliLiter(s) IV Push every 1 hour PRN Pre/post blood products, medications, blood draw, and to maintain line patency    Vital Signs Last 24 Hrs  T(C): 36.5 (24 Feb 2025 08:06), Max: 37 (23 Feb 2025 15:52)  T(F): 97.7 (24 Feb 2025 08:06), Max: 98.6 (23 Feb 2025 15:52)  HR: 71 (24 Feb 2025 08:06) (71 - 88)  BP: 123/71 (24 Feb 2025 08:06) (120/70 - 123/71)  BP(mean): --  RR: 18 (24 Feb 2025 08:06) (18 - 18)  SpO2: 96% (24 Feb 2025 08:06) (96% - 98%)    Parameters below as of 24 Feb 2025 08:06  Patient On (Oxygen Delivery Method): room air        I&O's Summary    23 Feb 2025 07:01  -  24 Feb 2025 07:00  --------------------------------------------------------  IN: 0 mL / OUT: 650 mL / NET: -650 mL    24 Feb 2025 07:01  -  24 Feb 2025 14:44  --------------------------------------------------------  IN: 0 mL / OUT: 350 mL / NET: -350 mL          Physical Exam:   GENERAL: NAD, well-groomed, well-developed  HEENT: VINNY/   Atraumatic, Normocephalic  ENMT: No tonsillar erythema, exudates, or enlargement; Moist mucous membranes, Good dentition, No lesions  NECK: Supple, No JVD, Normal thyroid  CHEST/LUNG: Decreased R base   CVS: Regular rate and rhythm; No murmurs, rubs, or gallops  GI: : Soft, Nontender, Nondistended; Bowel sounds present  NERVOUS SYSTEM:  Alert & Oriented X3  EXTREMITIES:  2+ Peripheral Pulses, No clubbing, cyanosis, or edema  LYMPH: No lymphadenopathy noted  SKIN: No rashes or lesions  ENDOCRINOLOGY: No Thyromegaly  PSYCH: Appropriate    Labs:  27                            8.7    7.68  )-----------( 201      ( 23 Feb 2025 07:55 )             29.0                         8.3    8.51  )-----------( 229      ( 22 Feb 2025 07:20 )             27.7                         8.7    8.36  )-----------( 214      ( 21 Feb 2025 07:17 )             29.5     02-23    135  |  98  |  19  ----------------------------<  84  4.2   |  24  |  4.16[H]  02-22    134[L]  |  95[L]  |  27[H]  ----------------------------<  144[H]  4.2   |  24  |  4.99[H]  02-21    133[L]  |  94[L]  |  17  ----------------------------<  91  3.8   |  25  |  3.39[H]    Ca    8.4      23 Feb 2025 07:55    TPro  5.7[L]  /  Alb  2.5[L]  /  TBili  0.2  /  DBili  x   /  AST  11  /  ALT  5[L]  /  AlkPhos  96  02-22  TPro  5.6[L]  /  Alb  2.4[L]  /  TBili  0.4  /  DBili  x   /  AST  8[L]  /  ALT  <5[L]  /  AlkPhos  88  02-21    CAPILLARY BLOOD GLUCOSE              Urinalysis Basic - ( 23 Feb 2025 07:55 )    Color: x / Appearance: x / SG: x / pH: x  Gluc: 84 mg/dL / Ketone: x  / Bili: x / Urobili: x   Blood: x / Protein: x / Nitrite: x   Leuk Esterase: x / RBC: x / WBC x   Sq Epi: x / Non Sq Epi: x / Bacteria: x            RECENT CULTURES:  02-20 @ 01:55 .Blood Blood-Peripheral                No growth at 4 days        Studies  < from: CT Chest No Cont (02.23.25 @ 16:46) >    ACC: [id number] EXAM:  CT CHEST   ORDERED BY:  NELL TOLBERT     PROCEDURE DATE:  02/23/2025          INTERPRETATION:  CLINICAL INFORMATION: 74-year-old male with cough    TECHNIQUE: A volumetric CT acquisition of the chest was obtained from the   thoracic inlet to the upper abdomen, without administration of   intravenous contrast. This CT scan was performed with one or more of the   following dose optimization: iterative reconstruction, automatic exposure   control, and/or manual adjustmentof mAs and kVp according to the   patient's size. 3D MIP and volume-rendered images were created at the   scanner.    COMPARISON: The study was compared to CT chest dated 2/23/2025    FINDINGS:  Lines and Tubes: There is a right-sided central venous catheter with the   tip terminating in distal right brachiocephalic vein.    Mediastinum: The thyroid and thoracic inlet are normal. There is no   enlarged axiliary, mediastinal, or hilar lymph nodes. The heart size is   within normal limits. There isno pericardial effusion. The aorta is   normal in course and caliber, with mild calcified atheromas. Lobar   pulmonary arteries have low attenuation, likely artifactual and due to   beam hardening. The esophagus is unremarkable.    Lung: The central tracheobronchial tree is patent. There is no focal   consolidation. There is mild peribronchial wall thickening most prominent   in the left lower lobe. There are mucoid impaction and airway associated   and subpleural groundglass opacities, more prominent in the left lower   lobe and to a lesser degree in right lower lobe..    Pleura: There is no pleural effusion or pneumothorax.    Abdomen: There is prominence of right renal pelvis. Cholelithiasis is   noted. There is no acute upper abdominal finding.    Bone and Soft Tissue: There is no evidence of osteosclerotic or   osteolytic lesions. There are multilevel degenerative changes of thoracic   spine , with disk space narrowing and osteophytosis. There is mild   subcutaneous edema in left lateral upper abdominal wall.    IMPRESSION:  Mucoid impaction and airway associated subpleural groundglass opacities,   most prominent in left lower lobe and to a lesser degree in right lower   lobe.  Mild bronchial wall thickening, likely inflammatory.    --- End of Report ---        < end of copied text >

## 2025-02-24 NOTE — PROGRESS NOTE ADULT - PROBLEM SELECTOR PLAN 1
-CXR with elevated R hemidiaphragm (not present on CXR in December 2024)  -Low grade temp, cough  -CT chest with LLL, RLL GGO, mucoid impacted airways. No clear PNA. Monitoring off ABX per ID  -Suggest Duoneb q6h, Mucinex 1020 mg PO BID   -Normoxic, keep sats >90% with o2 PRN.

## 2025-02-24 NOTE — PROGRESS NOTE ADULT - ASSESSMENT
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 The initial plan was that he would present to the [facility] ER Monday 2/17 for HD initiation. However, day of admission, he fell and sustained trauma to his knee. Called to consult for cough, elevated R hemidiaphragm.

## 2025-02-24 NOTE — CONSULT NOTE ADULT - SUBJECTIVE AND OBJECTIVE BOX
Interventional Radiology    Evaluate for Procedure: Tunneled cath placement    HPI: 74y Male with     Allergies: No Known Allergies    Medications (Abx/Cardiac/Anticoagulation/Blood Products)    heparin   Injectable: 5000 Unit(s) SubCutaneous (02-24 @ 06:03)  piperacillin/tazobactam IVPB..: 25 mL/Hr IV Intermittent (02-22 @ 12:14)    Data:    T(C): 36.5  HR: 71  BP: 123/71  RR: 18  SpO2: 96%    -WBC 7.68 / HgB 8.7 / Hct 29.0 / Plt 201  -Na 135 / Cl 98 / BUN 19 / Glucose 84  -K 4.2 / CO2 24 / Cr 4.16  -ALT -- / Alk Phos -- / T.Bili --  -INR 1.20 / PTT 31.1    Radiology:     Assessment/Plan:   -74y Male with      - case reviewed and approved for ____  - please place IR procedure order under ______  - STAT labs in AM (cbc,coags, bmp, T&S)  - hold AC x___hrs  - NPO on ____ at 11pm  - d/w primary team  - If patient clinically decompensates please contact IR for more urgent intervention      --  Scotty Alfred NP  Interventional Radiology  Available on Microsoft TEAMS / [email or web address]    For EMERGENT inquiries/questions:  IR Pager ([facility]): [phone]    For non-emergent consults/questions:   Please place a sunrise order "Consult- Interventional Radiology" with an appropriate callback number    For questions about scheduling during appropriate work hours, call IR :  [facility]: [phone]    For outpatient IR booking:  [facility]: [phone]   Interventional Radiology    Evaluate for Procedure: Tunneled cath placement    HPI: 74M w/ polio, neurogenic bladder/suprapubic catheter, 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  Patient is s/p non tunneled catheter placement by vascular on 2/17. IR consulted for tunneled HD catheter insertion for long term HD.    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  Patient is s/p non tunneled catheter placement by vascular on 2/17. IR consulted for tunneled HD catheter insertion for long term HD.    - case reviewed and approved for Wednesday  - please place IR procedure order under JENNIFER Alfred  - STAT labs in AM on Wednesday (cbc,coags, bmp, T&S)  - hold SQ heparin x 12hrs  - NPO on Tuesday at 11pm  - d/w primary team    --  Scotty Alfred NP  Interventional Radiology  Available on Microsoft TEAMS / [email or web address]    For EMERGENT inquiries/questions:  IR Pager ([facility]): [phone]    For non-emergent consults/questions:   Please place a sunrise order "Consult- Interventional Radiology" with an appropriate callback number    For questions about scheduling during appropriate work hours, call IR :  [facility]: [phone]    For outpatient IR booking:  [facility]: [phone]   Interventional Radiology    Evaluate for Procedure: Tunneled cath placement    HPI: 74M w/ polio, neurogenic bladder/suprapubic catheter, 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  Patient is s/p non tunneled catheter placement by vascular on 2/17. IR consulted for tunneled HD catheter insertion for long term HD.    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  Patient is s/p non tunneled catheter placement by vascular on 2/17. IR consulted for tunneled HD catheter insertion for long term HD.    - case reviewed and approved for Wednesday  - please place IR procedure order under JENNIFER Alfred for tunneled HD catheter placement  - STAT labs in AM on Wednesday (cbc,coags, bmp, T&S)  - hold SQ heparin x 12hrs  - NPO on Tuesday at 11pm  - d/w primary team    --  Scotty Alfred NP  Interventional Radiology  Available on Microsoft TEAMS / [email or web address]    For EMERGENT inquiries/questions:  IR Pager ([facility]): [phone]    For non-emergent consults/questions:   Please place a sunrise order "Consult- Interventional Radiology" with an appropriate callback number    For questions about scheduling during appropriate work hours, call IR :  [facility]: [phone]    For outpatient IR booking:  [facility]: [phone]

## 2025-02-24 NOTE — PROGRESS NOTE ADULT - ASSESSMENT
74M with history of HTN, polio with multiple subsequent problems including LE weakness requiring wheelchair, neurogenic bladder s/p suprapubic catheter, and colostomy s/p iatrogenic rectal injury 2/2023, and CKD 5 presenting to the ED with weakness and falls. Admitted for initiation of HD now s/p R IJ shiley placement on 2/17/25. Needs long term access per nephro     Plan:  - Pending surgical planning given 2 concurrent vascular issues   - CT Angio A/P obtained, reviewed  - AVF creation planning; vein mapping reviewed  - Please preserve nondominant arm (left side, no blood draws etc.)   - IR consult for conversion of shiley into permacath   - Medical and cardiac clearances documented for future AVF creation     Vascular Surgery   [id number]

## 2025-02-24 NOTE — PROGRESS NOTE ADULT - SUBJECTIVE AND OBJECTIVE BOX
[facility]  SABINO Griffin Y. Patel, S. Shah, G. Casimir  [phone]  ([phone] - weekdays after 5pm and weekends)    Name: BRIAN DEMPSEY  Age/Gender: 74y Male  MRN: [id number]    Interval History:  Patient seen and examined this morning.   Resting comfortably, did not sleep much overnight.  Wife at bedside.   Notes reviewed. Afebrile   Allergies: No Known Allergies      Objective:  Vitals:   T(F): 97.7 (02-24-25 @ 08:06), Max: 98.6 (02-23-25 @ 15:52)  HR: 71 (02-24-25 @ 08:06) (71 - 88)  BP: 123/71 (02-24-25 @ 08:06) (120/70 - 123/71)  RR: 18 (02-24-25 @ 08:06) (18 - 18)  SpO2: 96% (02-24-25 @ 08:06) (96% - 98%)  Physical Examination:  General: no acute distress, nontoxic appearing   HEENT: normocephalic, atraumatic, anicteric  Respiratory: no acc muscle use, breathing comfortably  Cardiovascular: S1 and S2 present  Gastrointestinal: normal appearing, nondistended  Extremities: no edema    Laboratory Studies:  CBC:                       8.7    7.68  )-----------( 201      ( 23 Feb 2025 07:55 )             29.0     WBC Trend:  7.68 02-23-25 @ 07:55  8.51 02-22-25 @ 07:20  8.36 02-21-25 @ 07:17  7.99 02-20-25 @ 02:05  6.99 02-19-25 @ 06:23    CMP: 02-23    135  |  98  |  19  ----------------------------<  84  4.2   |  24  |  4.16[H]    Ca    8.4      23 Feb 2025 07:55    Creatinine: 4.16 mg/dL (02-23-25 @ 07:55)  Creatinine: 4.99 mg/dL (02-22-25 @ 07:03)  Creatinine: 3.39 mg/dL (02-21-25 @ 07:16)  Creatinine: 3.21 mg/dL (02-20-25 @ 02:05)  Creatinine: 4.12 mg/dL (02-19-25 @ 06:23)  Creatinine: 5.33 mg/dL (02-18-25 @ 08:55)    Microbiology: reviewed     Culture - Blood (collected 02-20-25 @ 01:55)  Source: .Blood Blood-Peripheral  Preliminary Report (02-24-25 @ 04:01):    No growth at 4 days    02-21-25 @ 17:26 SARS-CoV-2 NotDetec/Influenza A NotDetec/Influenza B NotDetec/RSV NotDetec  02-20-25 @ 06:42 SARS-CoV-2 NotDetec/Influenza A NotDetec/Influenza B NotDetec/RSV NotDetec    Radiology: reviewed     Medications:  acetaminophen     Tablet .. 650 milliGRAM(s) Oral every 6 hours PRN  albuterol/ipratropium for Nebulization 3 milliLiter(s) Nebulizer every 6 hours  atorvastatin 40 milliGRAM(s) Oral at bedtime  calcium acetate 1334 milliGRAM(s) Oral three times a day with meals  chlorhexidine 4% Liquid 1 Application(s) Topical <User Schedule>  epoetin aleah-epbx (RETACRIT) Injectable 71445 Unit(s) IV Push <User Schedule>  guaiFENesin ER 1200 milliGRAM(s) Oral every 12 hours  heparin   Injectable 5000 Unit(s) SubCutaneous every 8 hours  HYDROmorphone  Injectable 2 milliGRAM(s) IV Push every 6 hours PRN  hydrOXYzine hydrochloride 25 milliGRAM(s) Oral two times a day  ibuprofen  Tablet. 400 milliGRAM(s) Oral every 12 hours  ketoconazole 2% Shampoo 1 Application(s) Topical <User Schedule>  oxyCODONE    IR 10 milliGRAM(s) Oral every 6 hours PRN  oxyCODONE  ER Tablet 30 milliGRAM(s) Oral every 12 hours  pantoprazole    Tablet 40 milliGRAM(s) Oral before breakfast  sodium chloride 0.9% lock flush 10 milliLiter(s) IV Push every 1 hour PRN    Prior/Completed Antimicrobials:  piperacillin/tazobactam IVPB.  piperacillin/tazobactam IVPB.-  vancomycin  IVPB

## 2025-02-24 NOTE — PROGRESS NOTE ADULT - PROBLEM SELECTOR PLAN 1
Admitted for initiation of HD  s/p shiley placement 2/17  Tolerating HD  Vascular surgery consulted for HD access.  Acceptable cardiac risk to proceed with AVF creation.  IR consult for conversion of shiley into permacath

## 2025-02-24 NOTE — PROGRESS NOTE ADULT - PROBLEM SELECTOR PLAN 3
-Neuro following  -ABG 2/20 acceptable  -Pt lethargic 2/22, ABG never sent but AMS appears to be improving   -Check VBG in AM.

## 2025-02-24 NOTE — PROGRESS NOTE ADULT - SUBJECTIVE AND OBJECTIVE BOX
Patient is a 74y old  Male who presents with a chief complaint of ESRD requiring HD, uremia (24 Feb 2025 14:43)      SUBJECTIVE / OVERNIGHT EVENTS: pain is controlled  if the LLE is immobile and fully extended. doesn't tolerate the knee immobilizer. has a medium condyle and acute on chronic patella fx in LEFT knee. as per vascular ptn will need iliac stent  w a fem-fem bypass , possible axillo-femoral bypass. for this surgery he would need cardiac work up . more pressing surgery is LUE AVF creation,  in IR will arrange for Permacath. for pain control: Motrin 400 mg q12H, Oxy ER 30 mg q12H, Oxy IR 10 for mod pain and dilaudid 2 mg iv for severe pain. still has pruritis though improved, will raise atarax 25 mg to tid. plan of care d/w daughter Norma Persaud , ptn and his wife. Also d/w renal, card, vascular, ACP    MEDICATIONS  (STANDING):  albuterol/ipratropium for Nebulization 3 milliLiter(s) Nebulizer every 6 hours  atorvastatin 40 milliGRAM(s) Oral at bedtime  calcium acetate 1334 milliGRAM(s) Oral three times a day with meals  chlorhexidine 4% Liquid 1 Application(s) Topical <User Schedule>  epoetin aleah-epbx (RETACRIT) Injectable 63464 Unit(s) IV Push <User Schedule>  guaiFENesin ER 1200 milliGRAM(s) Oral every 12 hours  heparin   Injectable 5000 Unit(s) SubCutaneous every 8 hours  hydrOXYzine hydrochloride 25 milliGRAM(s) Oral two times a day  ibuprofen  Tablet. 400 milliGRAM(s) Oral every 12 hours  ketoconazole 2% Shampoo 1 Application(s) Topical <User Schedule>  oxyCODONE  ER Tablet 30 milliGRAM(s) Oral every 12 hours  pantoprazole    Tablet 40 milliGRAM(s) Oral before breakfast    MEDICATIONS  (PRN):  acetaminophen     Tablet .. 650 milliGRAM(s) Oral every 6 hours PRN Temp greater or equal to 38C (100.4F), Mild Pain (1 - 3)  HYDROmorphone  Injectable 2 milliGRAM(s) IV Push every 6 hours PRN Severe Pain (7 - 10)  oxyCODONE    IR 10 milliGRAM(s) Oral every 6 hours PRN moderate breakthrough Pain  sodium chloride 0.9% lock flush 10 milliLiter(s) IV Push every 1 hour PRN Pre/post blood products, medications, blood draw, and to maintain line patency      Vital Signs Last 24 Hrs  T(F): 97.7 (02-24-25 @ 08:06), Max: 97.8 (02-24-25 @ 06:30)  HR: 71 (02-24-25 @ 08:06) (71 - 80)  BP: 123/71 (02-24-25 @ 08:06) (120/71 - 123/71)  RR: 18 (02-24-25 @ 08:06) (18 - 18)  SpO2: 96% (02-24-25 @ 08:06) (96% - 98%)  Telemetry:   CAPILLARY BLOOD GLUCOSE        I&O's Summary    23 Feb 2025 07:01  -  24 Feb 2025 07:00  --------------------------------------------------------  IN: 0 mL / OUT: 650 mL / NET: -650 mL    24 Feb 2025 07:01  -  24 Feb 2025 15:58  --------------------------------------------------------  IN: 0 mL / OUT: 350 mL / NET: -350 mL        PHYSICAL EXAM:  GENERAL: NAD, well-developed  HEAD:  Atraumatic, Normocephalic  EYES: EOMI, PERRLA, conjunctiva and sclera clear  NECK: Supple, No JVD  CHEST/LUNG: Clear to auscultation bilaterally; No wheeze  HEART: Regular rate and rhythm; No murmurs, rubs, or gallops  ABDOMEN: Soft, Nontender, Nondistended; Bowel sounds present  EXTREMITIES:  2+ Peripheral Pulses, No clubbing, cyanosis, or edema  PSYCH: AAOx3  NEUROLOGY: non-focal  SKIN: No rashes or lesions    LABS:                        8.7    7.68  )-----------( 201      ( 23 Feb 2025 07:55 )             29.0     02-23    135  |  98  |  19  ----------------------------<  84  4.2   |  24  |  4.16[H]    Ca    8.4      23 Feb 2025 07:55            Urinalysis Basic - ( 23 Feb 2025 07:55 )    Color: x / Appearance: x / SG: x / pH: x  Gluc: 84 mg/dL / Ketone: x  / Bili: x / Urobili: x   Blood: x / Protein: x / Nitrite: x   Leuk Esterase: x / RBC: x / WBC x   Sq Epi: x / Non Sq Epi: x / Bacteria: x        RADIOLOGY & ADDITIONAL TESTS:    Imaging Personally Reviewed:    Consultant(s) Notes Reviewed:      Care Discussed with Consultants/Other Providers:

## 2025-02-24 NOTE — PROGRESS NOTE ADULT - SUBJECTIVE AND OBJECTIVE BOX
SUBJECTIVE: NAEO.     Vital Signs Last 24 Hrs  T(C): 36.6 (24 Feb 2025 06:30), Max: 37.1 (23 Feb 2025 08:55)  T(F): 97.8 (24 Feb 2025 06:30), Max: 98.8 (23 Feb 2025 08:55)  HR: 80 (24 Feb 2025 06:30) (80 - 95)  BP: 120/71 (24 Feb 2025 06:30) (120/70 - 129/55)  BP(mean): --  RR: 18 (24 Feb 2025 06:30) (18 - 18)  SpO2: 98% (24 Feb 2025 06:30) (93% - 98%)    Parameters below as of 24 Feb 2025 06:30  Patient On (Oxygen Delivery Method): room air        I&O's Detail    23 Feb 2025 07:01  -  24 Feb 2025 07:00  --------------------------------------------------------  IN:  Total IN: 0 mL    OUT:    Indwelling Catheter - Suprapubic (mL): 650 mL  Total OUT: 650 mL    Total NET: -650 mL      24 Feb 2025 07:01  -  24 Feb 2025 08:08  --------------------------------------------------------  IN:  Total IN: 0 mL    OUT:    Indwelling Catheter - Suprapubic (mL): 350 mL  Total OUT: 350 mL    Total NET: -350 mL          Physical Exam:  General: NAD  Neuro: Awake, alert and oriented x3  Resp: Nonlabored breathing on RA  CV: Hemodynamically stable. R IJ [facility] site c/d/i   GI/Abd: Soft, obese, nontender. Dressing to [facility] C/D/I. Suprapubic catheter in place  Ext: Palp femoral pulses bilat. BLE atrophic, minimal dorsi/plantarflexion bilaterally. Sensation grossly intact. LLE edematous compared to R, erythema to right toes/forefoot. No active wounds bilat.       LABS:                        8.7    7.68  )-----------( 201      ( 23 Feb 2025 07:55 )             29.0     02-23    135  |  98  |  19  ----------------------------<  84  4.2   |  24  |  4.16[H]    Ca    8.4      23 Feb 2025 07:55        Urinalysis Basic - ( 23 Feb 2025 07:55 )    Color: x / Appearance: x / SG: x / pH: x  Gluc: 84 mg/dL / Ketone: x  / Bili: x / Urobili: x   Blood: x / Protein: x / Nitrite: x   Leuk Esterase: x / RBC: x / WBC x   Sq Epi: x / Non Sq Epi: x / Bacteria: x        RADIOLOGY & ADDITIONAL STUDIES:

## 2025-02-24 NOTE — PROGRESS NOTE ADULT - ASSESSMENT
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 He is well known to me from multiple recent admisions  s/p admissions at [facility] 12/20-12/25/24 and 2/4-2/7 with SONDRA on CKD with associated uremic symptoms.   At the last admission he had expressed strong interest to try to hold off with HD intiation but he has been getting worse clinically at home.  His SONDRA and uremic symptoms significantly improved after the first admission; they improved a to mild extent during the 2nd admission to the point where he was able to be discharged without HD. Since then, however, he has worsened further.   He has been suffering from progressively worsening diffuse pruritus, loss of appetite, and generalized weakness and falls.   His nephrologist and PCP has been speaking with him and his family over the past few days,   The initial plan was that he would present to the [facility] ER Monday 2/17 (ie tomorrow) for HD initiation. Today, however, he fell and sustained trauma to his knee. Given the fall and concern for knee fracture, he presented to the ER today. multiple xrays show no Fractures.      CKD5, uremia, requiring initiation of HD  Rash, seborrheic dermatitis  Pruritis  Anemia  PAD  SP catheter, chronic  Left inferior pole acute on chronic patella Fx    plan  - HD via [facility]     -2/24: pain is controlled  if the LLE is immobile and fully extended. doesn't tolerate the knee immobilizer. has a medium condyle and acute on chronic patella fx in LEFT knee. as per vascular ptn will need iliac stent  w a fem-fem bypass, possible axillo-femoral bypass. for this surgery he would need cardiac work up . more pressing surgery is LUE AVF creation,  in IR will arrange for Permacath. for pain control: Motrin 400 mg q12H, Oxy ER 30 mg q12H, Oxy IR 10 for mod pain and dilaudid 2 mg iv for severe pain. still has pruritis though improved, will raise atarax 25 mg to tid. plan of care d/w daughter Norma Persaud , ptn and his wife. Also d/w renal, card, vascular, ACP    -2/23: Daughter Cori is the HCP, she and the ptn filled out the paperwork. daily plan and findings d/w her and the ptn. MS is at abseline, c/o b/l LE foot pain and Left Knee pain. xrays of left knee: cannot r/o acute on chronic inferior pole patellar Fx. knee immobilizer is on, awaiting ortho consult. doubt needs any intervention awaiting Ct chest today, will add CT Left knee. ptn states itching has recurred, severe pain has recurred. will resume Atatrax ( 25 mg bid) and Oxycodone ER , but at a lower dose 15 mg q12H. cont prn analgesics. HD as per renal, awaiting Vascular consult f/u re CTA A/L &LE and recs. ptn also wants AVF surgery on this admission. Coughing has stopped    - 2/22: ptn is drowsy but arousable, calmer today, answers questions appropriately. he is pain free, he stopped coughing, he denies having pruritis: will DC:  OXY ER, Atarax, Tessalon perles.     -  2/21: ptn is tearful, a bit confused, coughing, recognizes me and family at bedside. GOC d/w daughter and wife. AMS prob delirium 2/2 acute illness +/- opiods, lyrica, atarac. will lower atarak to tid, dc lyrica, cont Oxy 2/2 ptn has severe LE pain. neuro called for eval. Head ct done yesterday w no acute findings. CTA A/P w LE run fof: severe PAD, vascular to follow up on plan fo care. plan for HD tomorrow and next week place on tiw schedule of MWF. will need tunneled catheter prior to DC and will d/w vascular scheduling of AVF. ptn wants all to be done while inptn. daughter wants to be HCP as per ptn's wishes. she was given paperwork to get it signed and witnessed and will present to nursing thereafter. details of findings and complaints and plan of care d/w daughter and wife. spent 60 min. RVP ordered. start mucinex , tessalon perles, duonebs, get CT chest to r/o PNA. pulm called    -  2/20:  ptn had RRT 2/2 AMS and tremors. no SZ, no LOC. noted to have Na 123( hyponatremia), given 500 cc NS. Gabapentin DCed. ptn had been taking gabapentin at home PTA. Pain is controlled. Pruritis resolved, tolerating HD otherwise.     - Pain management:  cont Oxycodone 10 IR q6H,  DIlaudid prn severe pain 2 mg q4H prn.   - cont outptn meds  - DVT ppx w HSC

## 2025-02-24 NOTE — PROGRESS NOTE ADULT - SUBJECTIVE AND OBJECTIVE BOX
Overnight events noted      VITAL:  T(C): , Max: 37 (02-23-25 @ 15:52)  T(F): , Max: 98.6 (02-23-25 @ 15:52)  HR: 71 (02-24-25 @ 08:06)  BP: 123/71 (02-24-25 @ 08:06)  BP(mean): --  RR: 18 (02-24-25 @ 08:06)  SpO2: 96% (02-24-25 @ 08:06)  Wt(kg): --      PHYSICAL EXAM:  Constitutional: alert, mildly anxious but NAD at rest on NCO2  HEENT: NCAT, DMM  Neck: Supple, No JVD  Respiratory: CTA-b/l  Cardiovascular: RRR s1s2, no m/r/g  Gastrointestinal: BS+, soft, NT/ND  Extremities: No peripheral edema b/l  Neurological: no focal deficits; strength grossly intact  Back: no CVAT b/l  Skin: No rashes, no nevi  [facility]    LABS:                        8.7    7.68  )-----------( 201      ( 23 Feb 2025 07:55 )             29.0     Na(135)/K(4.2)/Cl(98)/HCO3(24)/BUN(19)/Cr(4.16)Glu(84)/Ca(8.4)/Mg(--)/PO4(--)    02-23 @ 07:55  Na(134)/K(4.2)/Cl(95)/HCO3(24)/BUN(27)/Cr(4.99)Glu(144)/Ca(8.3)/Mg(--)/PO4(--)    02-22 @ 07:03      IMAGING:  < from: CT Knee No Cont, Left (02.23.25 @ 16:46) >  Suspect acute nondisplaced fracture at the medial femoral condyle with an   acute to subacute appearing nondisplaced fracture across the midportion   of the patella..    < from: CT Chest No Cont (02.23.25 @ 16:46) >  Mucoid impaction and airway associated subpleural groundglass opacities,   most prominent in left lower lobe and to a lesser degree in right lower   lobe.  Mild bronchial wall thickening, likely inflammatory.            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 He will also need conversion of shiley to tunneled cath by IR prior to discharge    (3)Hyperphosphatemia - improved, now that he is on HD and on Phoslo    (4)Anemia - s/p IV iron; on Retacrit with HD    (5)Ortho - nondisplaced acute on chronic patellar fracture - Ortho input appreciated - recommending conservative therapy as inpatient    (6)ID - not spiking fevers/not on antibiotic therapy at present    (7)Neuro - likely multifactorial delirium - hospitalization, Lyrica, opiates, anxiety/depression associated with HD initiation, etc. Neuro input appreciated    (8)PAD - significant disease on CTA - Vascular on board    (9)Dispo - would like to get him accepted to [facility] in [place] for in-center HD for now. Eventually, we would look to try to transition him to home HD.      RECOMMEND:  (1)HD today - no net UF - 140meq/L Na+ bath; Retacrit with HD  (2)Reduce Phoslo from 2001mg TID with meals to 1334mg TID with meals  (3)Trend PO4 levels  (4)Conversion of shiley to tunneled cath by IR - Tues 2/25?   (5)Vascular f/u regarding PAD management as well as eventual AV access  (6)Efforts to have patient accepted to [facility] for outside HD                Rafita Denny MD  [facility]  Office/on call physician: [phone]  Cell (7a-7g): [phone]       Wife at bedside  (+)L knee pain  tremor and pruritus improved    VITAL:  T(C): , Max: 37 (02-23-25 @ 15:52)  T(F): , Max: 98.6 (02-23-25 @ 15:52)  HR: 71 (02-24-25 @ 08:06)  BP: 123/71 (02-24-25 @ 08:06)  BP(mean): --  RR: 18 (02-24-25 @ 08:06)  SpO2: 96% (02-24-25 @ 08:06)  Wt(kg): --      PHYSICAL EXAM:  Constitutional: lethargic but alert/no longer confused  HEENT: NCAT, DMM  Neck: Supple, No JVD  Respiratory: CTA-b/l  Cardiovascular: RRR s1s2, no m/r/g  Gastrointestinal: BS+, soft, NT/ND  Extremities: No peripheral edema b/l  Neurological: no focal deficits; strength grossly intact  Back: no CVAT b/l  Skin: No rashes, no nevi  Access: [facility]    LABS:                        8.7    7.68  )-----------( 201      ( 23 Feb 2025 07:55 )             29.0     Na(135)/K(4.2)/Cl(98)/HCO3(24)/BUN(19)/Cr(4.16)Glu(84)/Ca(8.4)/Mg(--)/PO4(--)    02-23 @ 07:55  Na(134)/K(4.2)/Cl(95)/HCO3(24)/BUN(27)/Cr(4.99)Glu(144)/Ca(8.3)/Mg(--)/PO4(--)    02-22 @ 07:03      IMAGING:  < from: CT Knee No Cont, Left (02.23.25 @ 16:46) >  Suspect acute nondisplaced fracture at the medial femoral condyle with an   acute to subacute appearing nondisplaced fracture across the midportion   of the patella..    < from: CT Chest No Cont (02.23.25 @ 16:46) >  Mucoid impaction and airway associated subpleural groundglass opacities,   most prominent in left lower lobe and to a lesser degree in right lower   lobe.  Mild bronchial wall thickening, likely inflammatory.            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 He will also need conversion of shiley to tunneled cath by IR prior to discharge    (3)Hyperphosphatemia - improved, now that he is on HD and on Phoslo    (4)Anemia - s/p IV iron; on Retacrit with HD    (5)Ortho - nondisplaced acute on chronic patellar fracture - Ortho input appreciated - recommending conservative therapy as inpatient    (6)ID - not spiking fevers/not on antibiotic therapy at present    (7)Neuro - likely multifactorial delirium - hospitalization, Lyrica, opiates, anxiety/depression associated with HD initiation, etc. Neuro input appreciated    (8)PAD - significant disease on CTA - Vascular on board    (9)Dispo - would like to get him accepted to [facility] in [place] for in-center HD for now. Eventually, we would look to try to transition him to home HD.      RECOMMEND:  (1)HD today - no net UF - 140meq/L Na+ bath; Retacrit with HD  (2)Reduce Phoslo from 2001mg TID with meals to 1334mg TID with meals  (3)Trend PO4 levels  (4)Add standing Ibuprofen 400mg q12h to control pain (okay from Nephrology standpoint)  (5)Conversion of shiley to tunneled cath by IR - Tues 2/25?   (6)Vascular f/u regarding PAD management as well as eventual AV access  (7)Efforts to have patient accepted to [facility] for outside HD    counseled patient, wife, and daughter Cori (by phone) regarding outpatient HD options. Advised that in order to remain under my care in the near future, we would have to set him up for [facility] in [place], for the short term. Explained that we could plan for a unit closer to their home in the short term, including one of the units that my partner Dr. Jaquan Soares goes to...but I would not be able to preside over the care during that time. Explained that if/when he is converted to home HD, I would be able to manage the care and that location would not be as much of an issue. Explained it would likely be at least 2 months before home HD could be fully established.              Rafita Denny MD  [facility]  Office/on call physician: [phone]  Cell (7a-7i): [phone]

## 2025-02-24 NOTE — PROGRESS NOTE ADULT - ASSESSMENT
74 year old male with CKD, sp polio, paraplegia with associated neurogenic bladder s/p suprapubic catheter who was admitted s/p fall on 2/16.   CKD - ESRD, now s/p [facility] 2/17, on HD  ruled out cellulitis around suprapubic cath  2/20 s/p RRT for tremors and confusion -- pt with chronic tremors although notably worse  hyponatremia    labs sars cov2/rsv/flu neg  mrsa negative   2/20 CXR with clear lungs     temps wnl, pt asx, no leukocytosis, off antibiotics   SPC site with chronic/stable inflammatory changes, no drainage - no sign of SSTI  mental status at baseline now, nontoxic appearing   CTA abd with occlusion of b/l external iliac arteries and b/l superficial femoral arteries with distal reconstitution at popliteal arteries; patent three vessel runoff of RLE with 2 vessel runoff of LLE with occlusion of L mid anterior tibial artery with distal reconstitution  CTH with no acute findings   Bcx NGTD     s/p vancomycin x1 2/20  s/p zosyn 2/20-2/22      Recommendations:   Continue off antibiotics   Monitor for fever and trend wbc   Vascular surgery following   HD per renal    Continue rest of care per primary team     D/w wife at bedside   Bridgette Ramirez M.D.  [facility]  Available on [employer] TEAMS - *PREFERRED*  [phone]  After 5pm on weekdays and all day on weekends - please call [phone]     Thank you for consulting us and involving us in the management of this patients case. In addition to reviewing history, imaging, documents, labs, microbiology, took into account antibiotic stewardship, local antibiogram and infection control strategies and potential transmission issues at time of treatment decision making process.

## 2025-02-24 NOTE — PROGRESS NOTE ADULT - SUBJECTIVE AND OBJECTIVE BOX
Subjective: Patient seen and examined. No new events except as noted.     REVIEW OF SYSTEMS:    CONSTITUTIONAL: +weakness, fevers or chills  EYES/ENT: No visual changes;  No vertigo or throat pain   NECK: No pain or stiffness  RESPIRATORY: No cough, wheezing, hemoptysis; No shortness of breath  CARDIOVASCULAR: No chest pain or palpitations  GASTROINTESTINAL: No abdominal or epigastric pain. No nausea, vomiting, or hematemesis; No diarrhea or constipation. No melena or hematochezia.  GENITOURINARY: No dysuria, frequency or hematuria  NEUROLOGICAL: No numbness or weakness  SKIN: No itching, burning, rashes, or lesions   All other review of systems is negative unless indicated above.    MEDICATIONS:  MEDICATIONS  (STANDING):  albuterol/ipratropium for Nebulization 3 milliLiter(s) Nebulizer every 6 hours  atorvastatin 40 milliGRAM(s) Oral at bedtime  calcium acetate 1334 milliGRAM(s) Oral three times a day with meals  chlorhexidine 4% Liquid 1 Application(s) Topical <User Schedule>  epoetin aleah-epbx (RETACRIT) Injectable 82979 Unit(s) IV Push <User Schedule>  guaiFENesin ER 1200 milliGRAM(s) Oral every 12 hours  heparin   Injectable 5000 Unit(s) SubCutaneous every 8 hours  hydrOXYzine hydrochloride 25 milliGRAM(s) Oral two times a day  ketoconazole 2% Shampoo 1 Application(s) Topical <User Schedule>  oxyCODONE  ER Tablet 15 milliGRAM(s) Oral every 12 hours  pantoprazole    Tablet 40 milliGRAM(s) Oral before breakfast      PHYSICAL EXAM:  T(C): 36.5 (02-24-25 @ 08:06), Max: 37 (02-23-25 @ 15:52)  HR: 71 (02-24-25 @ 08:06) (71 - 88)  BP: 123/71 (02-24-25 @ 08:06) (120/70 - 123/71)  RR: 18 (02-24-25 @ 08:06) (18 - 18)  SpO2: 96% (02-24-25 @ 08:06) (96% - 98%)  Wt(kg): --  I&O's Summary    23 Feb 2025 07:01  -  24 Feb 2025 07:00  --------------------------------------------------------  IN: 0 mL / OUT: 650 mL / NET: -650 mL    24 Feb 2025 07:01  -  24 Feb 2025 09:48  --------------------------------------------------------  IN: 0 mL / OUT: 350 mL / NET: -350 mL          Appearance: Normal	  HEENT:   Normal oral mucosa, PERRL, EOMI	  Lymphatic: No lymphadenopathy  Cardiovascular: Normal S1 S2, No JVD, No murmurs, No edema  Respiratory: Lungs clear to auscultation	  Psychiatry: A & O x 3, Mood & affect appropriate  Skin: No rashes, No ecchymoses, No cyanosis	  Neurologic: Non-focal  GI/Abd: Soft, obese, nontender. Dressing to [facility] C/D/I. Suprapubic catheter in place  Ext: Palp femoral pulses bilat. BLE atrophic, minimal dorsi/plantarflexion bilaterally. Sensation grossly intact. LLE edematous compared to R, erythema to right toes/forefoot. No active wounds bilat.   Vascular: Peripheral pulses palpable 2+ bilaterally      LABS:    CARDIAC MARKERS:                                8.7    7.68  )-----------( 201      ( 23 Feb 2025 07:55 )             29.0     02-23    135  |  98  |  19  ----------------------------<  84  4.2   |  24  |  4.16[H]    Ca    8.4      23 Feb 2025 07:55      proBNP:   Lipid Profile:   HgA1c:   TSH:             TELEMETRY: 	    ECG:  	  RADIOLOGY:   DIAGNOSTIC TESTING:  [ ] Echocardiogram:  [ ]  Catheterization:  [ ] Stress Test:    OTHER: 	           Subjective: Patient seen and examined. No new events except as noted.     REVIEW OF SYSTEMS:    CONSTITUTIONAL: +weakness, fevers or chills  EYES/ENT: No visual changes;  No vertigo or throat pain   NECK: No pain or stiffness  RESPIRATORY: No cough, wheezing, hemoptysis; No shortness of breath  CARDIOVASCULAR: No chest pain or palpitations  GASTROINTESTINAL: No abdominal or epigastric pain. No nausea, vomiting, or hematemesis; No diarrhea or constipation. No melena or hematochezia.  GENITOURINARY: No dysuria, frequency or hematuria  NEUROLOGICAL: No numbness or weakness  SKIN: No itching, burning, rashes, or lesions   All other review of systems is negative unless indicated above.    MEDICATIONS:  MEDICATIONS  (STANDING):  albuterol/ipratropium for Nebulization 3 milliLiter(s) Nebulizer every 6 hours  atorvastatin 40 milliGRAM(s) Oral at bedtime  calcium acetate 1334 milliGRAM(s) Oral three times a day with meals  chlorhexidine 4% Liquid 1 Application(s) Topical <User Schedule>  epoetin aleah-epbx (RETACRIT) Injectable 89449 Unit(s) IV Push <User Schedule>  guaiFENesin ER 1200 milliGRAM(s) Oral every 12 hours  heparin   Injectable 5000 Unit(s) SubCutaneous every 8 hours  hydrOXYzine hydrochloride 25 milliGRAM(s) Oral two times a day  ketoconazole 2% Shampoo 1 Application(s) Topical <User Schedule>  oxyCODONE  ER Tablet 15 milliGRAM(s) Oral every 12 hours  pantoprazole    Tablet 40 milliGRAM(s) Oral before breakfast      PHYSICAL EXAM:  T(C): 36.5 (02-24-25 @ 08:06), Max: 37 (02-23-25 @ 15:52)  HR: 71 (02-24-25 @ 08:06) (71 - 88)  BP: 123/71 (02-24-25 @ 08:06) (120/70 - 123/71)  RR: 18 (02-24-25 @ 08:06) (18 - 18)  SpO2: 96% (02-24-25 @ 08:06) (96% - 98%)  Wt(kg): --  I&O's Summary    23 Feb 2025 07:01  -  24 Feb 2025 07:00  --------------------------------------------------------  IN: 0 mL / OUT: 650 mL / NET: -650 mL    24 Feb 2025 07:01  -  24 Feb 2025 09:48  --------------------------------------------------------  IN: 0 mL / OUT: 350 mL / NET: -350 mL          Appearance: Normal	  HEENT:   Normal oral mucosa, PERRL, EOMI	  Lymphatic: No lymphadenopathy  Cardiovascular: Normal S1 S2, No JVD, No murmurs, No edema  Respiratory: Lungs clear to auscultation	  Psychiatry: A & O x 3, Mood & affect appropriate  Skin: No rashes, No ecchymoses, No cyanosis	  Neurologic: Non-focal  GI/Abd: Soft, obese, nontender. Dressing to [facility] C/D/I. Suprapubic catheter in place  Ext: Palp femoral pulses bilat. BLE atrophic, minimal dorsi/plantarflexion bilaterally. Sensation grossly intact. LLE edematous compared to R, erythema to right toes/forefoot. No active wounds bilat.   LLE:  small superficial abrasion, +edema and +ecchymosis over L knee  +TTP over L knee, no TTP along remainder of extremity; compartments soft  Limited ROM at knee 2/2 pain  No gross varus/valgus laxity, but assessment limited 2/2 pain  Motor: TA/EHL/GS/FHL intact  Sensory: DP/SP/Tib/Jordan/Saph SILT  +DP pulse (symmetric relative to contralateral side), WWP  Vascular: Peripheral pulses palpable 2+ bilaterally      LABS:    CARDIAC MARKERS:                                8.7    7.68  )-----------( 201      ( 23 Feb 2025 07:55 )             29.0     02-23    135  |  98  |  19  ----------------------------<  84  4.2   |  24  |  4.16[H]    Ca    8.4      23 Feb 2025 07:55      proBNP:   Lipid Profile:   HgA1c:   TSH:             TELEMETRY: 	    ECG:  	  RADIOLOGY:   DIAGNOSTIC TESTING:  [ ] Echocardiogram:  [ ]  Catheterization:  [ ] Stress Test:    OTHER:

## 2025-02-24 NOTE — CHART NOTE - NSCHARTNOTEFT_GEN_A_CORE
Notified that patient's family would like to speak with orthopaedics.    Called emergency contact provided: Cori [phone]. Voicemail left as unable to speak with emergency contact.     Will try again another time.

## 2025-02-25 NOTE — PROGRESS NOTE ADULT - PROBLEM SELECTOR PLAN 1
Admitted for initiation of HD  s/p shiley placement 2/17  Tolerating HD  Vascular surgery consulted for HD access.  Acceptable cardiac risk to proceed with AVF creation.  IR consult for conversion of shiley into permacath  TTE wnl

## 2025-02-25 NOTE — PROGRESS NOTE ADULT - ASSESSMENT
74M with history of HTN, polio with multiple subsequent problems including LE weakness requiring wheelchair, neurogenic bladder s/p suprapubic catheter, and colostomy s/p iatrogenic rectal injury 2/2023, and CKD 5 presenting to the ED with weakness and falls. Admitted for initiation of HD now s/p R IJ shiley placement on 2/17/25. Needs long term access per nephro     Plan:  - Pending surgical planning given 2 concurrent vascular issues      - please have cardiology review for clearance for LE revascularization   - CT Angio A/P obtained, reviewed  - AVF creation planning; vein mapping reviewed  - Please preserve nondominant arm (left side, no blood draws etc.)   - Medical and cardiac clearances documented for future AVF creation     Vascular Surgery   [id number]

## 2025-02-25 NOTE — PROGRESS NOTE ADULT - PROBLEM SELECTOR PLAN 1
-CXR with elevated R hemidiaphragm (not present on CXR in December 2024)  -Low grade temp, cough  -CT chest with LLL, RLL GGO, mucoid impacted airways. No clear PNA. Monitoring off ABX per ID  -Suggest Duoneb q6h, Mucinex 1020 mg PO BID   -Mucomyst x5 days   -Incentive spirometry   -Normoxic, keep sats >90% with o2 PRN.

## 2025-02-25 NOTE — PROGRESS NOTE ADULT - PROBLEM SELECTOR PLAN 3
-Neuro following  -ABG 2/20 acceptable  -Pt lethargic 2/22, ABG never sent but AMS appears to be improving   -VBG this AM acceptable.

## 2025-02-25 NOTE — PROGRESS NOTE ADULT - ASSESSMENT
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  came in after fall and for HD.     2/20 CTH neg   \Na 123 --> now 133   A1c 5.7   TSH WNL 3.46   o/e 2/21 AAOx2, uppers 4/5, lowers limited .  2/23; family bedside upset that he has pain in leg after he was moved.  patient going for imaging now.     Imrpssion  1) AMS, waxing and waing , likely multifactorial from infection, metabolic/electrolyte derrangements/ delerium / medication effect , ureemia which is imporving   2) polio  3) fall 2/2 weakness  4) hyponatremia to 123 2/20  improved 133 -->136    - NPO for IR today   - f/u vascular   - was on zosyn for infection ; now off   - lyrica 25mg BID; stopped   - was getting oxycodone ER 30mg BID and oxy PRN IR i10 and dilauded PRN ;   - f/u psych   - limit sedating meds   - continue to monitor and correct metabolic derrangements .  - delerium precuations.   - frequent re orientation  - check b12, RPR, ammonia level   - will consdier MRI brain or EEG if doesn't improve but seems to have been improving   - PT/OT   - check FS, glucose control <180  - GI/DVT ppx  - Thank you for allowing me to participate in the care of this patient. Call with questions.      Paul Limon MD  Vascular Neurology  Office: [phone]

## 2025-02-25 NOTE — PROGRESS NOTE ADULT - ASSESSMENT
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 He is well known to me from multiple recent admisions  s/p admissions at [facility] 12/20-12/25/24 and 2/4-2/7 with SONDRA on CKD with associated uremic symptoms.   At the last admission he had expressed strong interest to try to hold off with HD intiation but he has been getting worse clinically at home.  His SONDRA and uremic symptoms significantly improved after the first admission; they improved a to mild extent during the 2nd admission to the point where he was able to be discharged without HD. Since then, however, he has worsened further.   He has been suffering from progressively worsening diffuse pruritus, loss of appetite, and generalized weakness and falls.   His nephrologist and PCP has been speaking with him and his family over the past few days,   The initial plan was that he would present to the [facility] ER Monday 2/17 (ie tomorrow) for HD initiation. Today, however, he fell and sustained trauma to his knee. Given the fall and concern for knee fracture, he presented to the ER today. multiple xrays show no Fractures.      CKD5, uremia, requiring initiation of HD  Rash, seborrheic dermatitis  Pruritis  Anemia  PAD  SP catheter, chronic  Left inferior pole acute on chronic patella Fx    plan  - HD via [facility]   - 2/25: ptn states he is hallucinating, but not at present, his MS is at baseline, his pain is controlled, he still needs prn pain meds when he is moved in the bed or for testing or for HD. awaiting Permacath, AVF creation, LE bypass to be d/w Dr. Swensno and the ptn tomorrow. ptn has cardiology clearance  if he opts for. Ptn has sacral decubiti and posterior thigh and buttock decibiti and b/l heel decubiti. Z flow bootie d/w RN, get wound care consult    -2/24: pain is controlled  if the LLE is immobile and fully extended. doesn't tolerate the knee immobilizer. has a medium condyle and acute on chronic patella fx in LEFT knee. as per vascular ptn will need iliac stent  w a fem-fem bypass, possible axillo-femoral bypass. for this surgery he would need cardiac work up . more pressing surgery is LUE AVF creation,  in IR will arrange for Permacath. for pain control: Motrin 400 mg q12H, Oxy ER 30 mg q12H, Oxy IR 10 for mod pain and dilaudid 2 mg iv for severe pain. still has pruritis though improved, will raise atarax 25 mg to tid. plan of care d/w daughter Norma Persaud , ptn and his wife. Also d/w renal, card, vascular, ACP    -2/23: Daughter Cori is the HCP, she and the ptn filled out the paperwork. daily plan and findings d/w her and the ptn. MS is at abseline, c/o b/l LE foot pain and Left Knee pain. xrays of left knee: cannot r/o acute on chronic inferior pole patellar Fx. knee immobilizer is on, awaiting ortho consult. doubt needs any intervention awaiting Ct chest today, will add CT Left knee. ptn states itching has recurred, severe pain has recurred. will resume Atatrax ( 25 mg bid) and Oxycodone ER , but at a lower dose 15 mg q12H. cont prn analgesics. HD as per renal, awaiting Vascular consult f/u re CTA A/L &LE and recs. ptn also wants AVF surgery on this admission. Coughing has stopped    - 2/22: ptn is drowsy but arousable, calmer today, answers questions appropriately. he is pain free, he stopped coughing, he denies having pruritis: will DC:  OXY ER, Atarax, Tessalon perles.     -  2/21: ptn is tearful, a bit confused, coughing, recognizes me and family at bedside. GOC d/w daughter and wife. AMS prob delirium 2/2 acute illness +/- opiods, lyrica, atarac. will lower atarak to tid, dc lyrica, cont Oxy 2/2 ptn has severe LE pain. neuro called for eval. Head ct done yesterday w no acute findings. CTA A/P w LE run fof: severe PAD, vascular to follow up on plan fo care. plan for HD tomorrow and next week place on tiw schedule of MWF. will need tunneled catheter prior to DC and will d/w vascular scheduling of AVF. ptn wants all to be done while inptn. daughter wants to be HCP as per ptn's wishes. she was given paperwork to get it signed and witnessed and will present to nursing thereafter. details of findings and complaints and plan of care d/w daughter and wife. spent 60 min. RVP ordered. start mucinex , tessalon perles, duonebs, get CT chest to r/o PNA. pulm called    -  2/20:  ptn had RRT 2/2 AMS and tremors. no SZ, no LOC. noted to have Na 123( hyponatremia), given 500 cc NS. Gabapentin DCed. ptn had been taking gabapentin at home PTA. Pain is controlled. Pruritis resolved, tolerating HD otherwise.     - Pain management:  cont Oxycodone 10 IR q6H,  DIlaudid prn severe pain 2 mg q4H prn.   - cont outptn meds  - DVT ppx w HSC

## 2025-02-25 NOTE — PROGRESS NOTE ADULT - ASSESSMENT
74 year old male with CKD, sp polio, paraplegia with associated neurogenic bladder s/p suprapubic catheter who was admitted s/p fall on 2/16.   CKD - ESRD, now s/p [facility] 2/17, on HD  ruled out cellulitis around suprapubic cath  2/20 s/p RRT for tremors and confusion -- pt with chronic tremors although notably worse  hyponatremia    labs sars cov2/rsv/flu neg  mrsa negative   2/20 CXR with clear lungs     temps wnl, pt asx, no leukocytosis, off antibiotics   SPC site with chronic/stable inflammatory changes, no drainage - no sign of SSTI  CTA abd with occlusion of b/l external iliac arteries and b/l superficial femoral arteries with distal reconstitution at popliteal arteries; patent three vessel runoff of RLE with 2 vessel runoff of LLE with occlusion of L mid anterior tibial artery with distal reconstitution  CTH with no acute findings   Bcx negative to date   mental status at baseline now, nontoxic appearing     s/p vancomycin x1 2/20  s/p zosyn 2/20-2/22      Recommendations:   Continue off antibiotics   Monitor temps/WBC  Vascular surgery following   HD per renal    Continue rest of care per primary team     D/w wife at bedside   Bridgette Ramirez M.D.  [facility]  Available on [employer] TEAMS - *PREFERRED*  [phone]  After 5pm on weekdays and all day on weekends - please call [phone]     Thank you for consulting us and involving us in the management of this patients case. In addition to reviewing history, imaging, documents, labs, microbiology, took into account antibiotic stewardship, local antibiogram and infection control strategies and potential transmission issues at time of treatment decision making process.

## 2025-02-25 NOTE — PROGRESS NOTE ADULT - PROBLEM SELECTOR PLAN 2
Nondisplaced L medial femoral condyle fx   As per vascular ptn will need iliac stent  w a fem-fem bypass, possible axillo-femoral bypass  Acceptable cardiac risk to proceed   ortho following

## 2025-02-25 NOTE — PROGRESS NOTE ADULT - SUBJECTIVE AND OBJECTIVE BOX
Neurology      S; patient seen. family bedside. patient sleeping ; NPO        Medications: MEDICATIONS  (STANDING):  acetylcysteine 20%  Inhalation 4 milliLiter(s) Inhalation every 6 hours  albuterol/ipratropium for Nebulization 3 milliLiter(s) Nebulizer every 6 hours  atorvastatin 40 milliGRAM(s) Oral at bedtime  calcium acetate 1334 milliGRAM(s) Oral three times a day with meals  chlorhexidine 4% Liquid 1 Application(s) Topical <User Schedule>  epoetin aleah-epbx (RETACRIT) Injectable 80882 Unit(s) IV Push <User Schedule>  guaiFENesin ER 1200 milliGRAM(s) Oral every 12 hours  hydrOXYzine hydrochloride 25 milliGRAM(s) Oral three times a day  ibuprofen  Tablet. 400 milliGRAM(s) Oral every 12 hours  ketoconazole 2% Shampoo 1 Application(s) Topical <User Schedule>  oxyCODONE  ER Tablet 30 milliGRAM(s) Oral every 12 hours  pantoprazole    Tablet 40 milliGRAM(s) Oral before breakfast    MEDICATIONS  (PRN):  acetaminophen     Tablet .. 650 milliGRAM(s) Oral every 6 hours PRN Temp greater or equal to 38C (100.4F), Mild Pain (1 - 3)  HYDROmorphone  Injectable 2 milliGRAM(s) IV Push every 6 hours PRN Severe Pain (7 - 10)  oxyCODONE    IR 10 milliGRAM(s) Oral every 6 hours PRN moderate breakthrough Pain  sodium chloride 0.9% lock flush 10 milliLiter(s) IV Push every 1 hour PRN Pre/post blood products, medications, blood draw, and to maintain line patency       Vitals:  Vital Signs Last 24 Hrs  T(C): 36.3 (25 Feb 2025 15:25), Max: 37.1 (25 Feb 2025 08:15)  T(F): 97.4 (25 Feb 2025 15:25), Max: 98.8 (25 Feb 2025 08:20)  HR: 93 (25 Feb 2025 16:25) (77 - 97)  BP: 135/74 (25 Feb 2025 16:25) (89/53 - 176/67)  BP(mean): 98 (25 Feb 2025 16:25) (76 - 98)  RR: 23 (25 Feb 2025 16:25) (13 - 23)  SpO2: 94% (25 Feb 2025 16:25) (94% - 100%)    Parameters below as of 25 Feb 2025 15:40  Patient On (Oxygen Delivery Method): room air            General Exam:   General Appearance: Appropriately dressed and in no acute distress       Head: Normocephalic, atraumatic and no dysmorphic features  Ear, Nose, and Throat: Moist mucous membranes  CVS: S1S2+  Resp: No SOB, no wheeze or rhonchi  GI: soft NT/ND  Extremities: LE PVD   Skin: No bruises or rashes     Neurological Exam:  Mental Status: Awake, alert and oriented x 2.  Able to follow simple and complex verbal commands. Able to name and repeat. fluent speech. No obvious aphasia or dysarthria noted.   Cranial Nerves: PERRL, EOMI, VFFC, sensation V1-V3 intact,  no obvious facial asymmetry, equal elevation of palate, scm/trap 5/5, tongue is midline on protrusion. no obvious papilledema on fundoscopic exam. hearing is grossly intact.   Motor: Normal bulk, tone and strength throughout uppers 4/ lowers 0-/1 5   Sensation: Intact to light touch and pinprick throughout.     Coordination: No dysmetria on FNF   Gait: deferred, wheelchair bound     Data/Labs/Imaging which I personally reviewed.      LABS:     LABS:                          8.5    6.92  )-----------( 196      ( 25 Feb 2025 10:12 )             29.1     02-25    136  |  97  |  20  ----------------------------<  83  3.7   |  28  |  4.28[H]    Ca    8.0[L]      25 Feb 2025 10:12    TPro  5.6[L]  /  Alb  2.4[L]  /  TBili  0.2  /  DBili  x   /  AST  13  /  ALT  7[L]  /  AlkPhos  102  02-25    LIVER FUNCTIONS - ( 25 Feb 2025 10:12 )  Alb: 2.4 g/dL / Pro: 5.6 g/dL / ALK PHOS: 102 U/L / ALT: 7 U/L / AST: 13 U/L / GGT: x           PT/INR - ( 25 Feb 2025 10:12 )   PT: 15.0 sec;   INR: 1.31 ratio           Urinalysis Basic - ( 25 Feb 2025 10:12 )    Color: x / Appearance: x / SG: x / pH: x  Gluc: 83 mg/dL / Ketone: x  / Bili: x / Urobili: x   Blood: x / Protein: x / Nitrite: x   Leuk Esterase: x / RBC: x / WBC x   Sq Epi: x / Non Sq Epi: x / Bacteria: x        < from: CT Head No Cont (02.20.25 @ 18:47) >    ACC: [id number] EXAM:  CT BRAIN   ORDERED BY: GUY DE LA CRUZ     PROCEDURE DATE:  02/20/2025          INTERPRETATION:  CLINICAL INDICATION: Altered mental status    TECHNIQUE: Axial CT scanning of the brain was obtained from the skull   base to the vertex without the administration of intravenous contrast.   Reformatted coronal and sagittal images were subsequently obtained and   reviewed.    COMPARISON: None    FINDINGS:  There is no CT evidence of acute transcortical infarct. Age-related   involutional changes and chronic microvascular ischemic changes.    There is no hydrocephalus, mass effect, or acute intracranial hemorrhage.   No extra-axial collection. Basal cisterns are patent.    The visualized paranasal sinuses and mastoid air cells are clear.    The calvarium is intact.    IMPRESSION:  No evidence of acute transcortical infarct, acute intracranial   hemorrhage, or mass effect.    --- End of Report ---            CORTNEY REARDON MD; Attending Radiologist  This document has been electronically signed. Feb 20 2025  7:07PM    < end of copied text >

## 2025-02-25 NOTE — PROGRESS NOTE ADULT - SUBJECTIVE AND OBJECTIVE BOX
Date of Service: 02-25-25 @ 09:39    Patient is a 74y old  Male who presents with a chief complaint of ESRD requiring HD, uremia (25 Feb 2025 09:07)      Any change in ROS:   Denies CP, SOB   Endorsing LE pain     MEDICATIONS  (STANDING):  acetylcysteine 20%  Inhalation 4 milliLiter(s) Inhalation every 6 hours  albuterol/ipratropium for Nebulization 3 milliLiter(s) Nebulizer every 6 hours  atorvastatin 40 milliGRAM(s) Oral at bedtime  calcium acetate 1334 milliGRAM(s) Oral three times a day with meals  chlorhexidine 4% Liquid 1 Application(s) Topical <User Schedule>  epoetin aleah-epbx (RETACRIT) Injectable 00629 Unit(s) IV Push <User Schedule>  guaiFENesin ER 1200 milliGRAM(s) Oral every 12 hours  hydrOXYzine hydrochloride 25 milliGRAM(s) Oral three times a day  ibuprofen  Tablet. 400 milliGRAM(s) Oral every 12 hours  ketoconazole 2% Shampoo 1 Application(s) Topical <User Schedule>  oxyCODONE  ER Tablet 30 milliGRAM(s) Oral every 12 hours  pantoprazole    Tablet 40 milliGRAM(s) Oral before breakfast    MEDICATIONS  (PRN):  acetaminophen     Tablet .. 650 milliGRAM(s) Oral every 6 hours PRN Temp greater or equal to 38C (100.4F), Mild Pain (1 - 3)  HYDROmorphone  Injectable 2 milliGRAM(s) IV Push every 6 hours PRN Severe Pain (7 - 10)  oxyCODONE    IR 10 milliGRAM(s) Oral every 6 hours PRN moderate breakthrough Pain  sodium chloride 0.9% lock flush 10 milliLiter(s) IV Push every 1 hour PRN Pre/post blood products, medications, blood draw, and to maintain line patency    Vital Signs Last 24 Hrs  T(C): 37.1 (25 Feb 2025 08:20), Max: 37.1 (25 Feb 2025 08:15)  T(F): 98.8 (25 Feb 2025 08:20), Max: 98.8 (25 Feb 2025 08:20)  HR: 77 (25 Feb 2025 08:20) (77 - 97)  BP: 110/66 (25 Feb 2025 08:20) (89/53 - 131/45)  BP(mean): --  RR: 18 (25 Feb 2025 08:20) (18 - 18)  SpO2: 99% (25 Feb 2025 08:20) (95% - 99%)    Parameters below as of 25 Feb 2025 08:20  Patient On (Oxygen Delivery Method): room air        I&O's Summary    24 Feb 2025 07:01  -  25 Feb 2025 07:00  --------------------------------------------------------  IN: 0 mL / OUT: 1050 mL / NET: -1050 mL          Physical Exam:   GENERAL: NAD, well-groomed, well-developed  HEENT: VINNY/   Atraumatic, Normocephalic  ENMT: No tonsillar erythema, exudates, or enlargement; Moist mucous membranes, Good dentition, No lesions  NECK: Supple, No JVD, Normal thyroid  CHEST/LUNG: Decreased R base   CVS: Regular rate and rhythm; No murmurs, rubs, or gallops  GI: : Soft, Nontender, Nondistended; Bowel sounds present  NERVOUS SYSTEM:  Alert & Oriented X3  EXTREMITIES: +LE pain   LYMPH: No lymphadenopathy noted  SKIN: No rashes or lesions  ENDOCRINOLOGY: No Thyromegaly  PSYCH: Appropriate    Labs:  30, 27                            8.7    7.68  )-----------( 201      ( 23 Feb 2025 07:55 )             29.0                         8.3    8.51  )-----------( 229      ( 22 Feb 2025 07:20 )             27.7     02-23    135  |  98  |  19  ----------------------------<  84  4.2   |  24  |  4.16[H]  02-22    134[L]  |  95[L]  |  27[H]  ----------------------------<  144[H]  4.2   |  24  |  4.99[H]      TPro  5.7[L]  /  Alb  2.5[L]  /  TBili  0.2  /  DBili  x   /  AST  11  /  ALT  5[L]  /  AlkPhos  96  02-22    CAPILLARY BLOOD GLUCOSE            RECENT CULTURES:  02-20 @ 01:55 .Blood Blood-Peripheral                No growth at 5 days        Studies  < from: CT Chest No Cont (02.23.25 @ 16:46) >    ACC: [id number] EXAM:  CT CHEST   ORDERED BY:  NELL TOLBERT     PROCEDURE DATE:  02/23/2025          INTERPRETATION:  CLINICAL INFORMATION: 74-year-old male with cough    TECHNIQUE: A volumetric CT acquisition of the chest was obtained from the   thoracic inlet to the upper abdomen, without administration of   intravenous contrast. This CT scan was performed with one or more of the   following dose optimization: iterative reconstruction, automatic exposure   control, and/or manual adjustmentof mAs and kVp according to the   patient's size. 3D MIP and volume-rendered images were created at the   scanner.    COMPARISON: The study was compared to CT chest dated 2/23/2025    FINDINGS:  Lines and Tubes: There is a right-sided central venous catheter with the   tip terminating in distal right brachiocephalic vein.    Mediastinum: The thyroid and thoracic inlet are normal. There is no   enlarged axiliary, mediastinal, or hilar lymph nodes. The heart size is   within normal limits. There isno pericardial effusion. The aorta is   normal in course and caliber, with mild calcified atheromas. Lobar   pulmonary arteries have low attenuation, likely artifactual and due to   beam hardening. The esophagus is unremarkable.    Lung: The central tracheobronchial tree is patent. There is no focal   consolidation. There is mild peribronchial wall thickening most prominent   in the left lower lobe. There are mucoid impaction and airway associated   and subpleural groundglass opacities, more prominent in the left lower   lobe and to a lesser degree in right lower lobe..    Pleura: There is no pleural effusion or pneumothorax.    Abdomen: There is prominence of right renal pelvis. Cholelithiasis is   noted. There is no acute upper abdominal finding.    Bone and Soft Tissue: There is no evidence of osteosclerotic or   osteolytic lesions. There are multilevel degenerative changes of thoracic   spine , with disk space narrowing and osteophytosis. There is mild   subcutaneous edema in left lateral upper abdominal wall.    IMPRESSION:  Mucoid impaction and airway associated subpleural groundglass opacities,   most prominent in left lower lobe and to a lesser degree in right lower   lobe.  Mild bronchial wall thickening, likely inflammatory.    --- End of Report ---      < end of copied text >

## 2025-02-25 NOTE — PROGRESS NOTE ADULT - SUBJECTIVE AND OBJECTIVE BOX
[facility]  SABINO Griffin Y. Patel, S. Shah, G. Casimir  [phone]  ([phone] - weekdays after 5pm and weekends)    Name: BRIAN DEMPSEY  Age/Gender: 74y Male  MRN: [id number]    Interval History:  Patient seen and examined this morning.   No new complaints noted.  Notes reviewed  No concerning overnight events  Afebrile. Wife at bedside.   Allergies: No Known Allergies      Objective:  Vitals:   T(F): 98.2 (02-25-25 @ 13:39), Max: 98.8 (02-25-25 @ 08:20)  HR: 89 (02-25-25 @ 13:39) (77 - 97)  BP: 112/59 (02-25-25 @ 13:39) (89/53 - 131/45)  RR: 18 (02-25-25 @ 13:39) (18 - 18)  SpO2: 98% (02-25-25 @ 13:39) (95% - 99%)  Physical Examination:  General: no acute distress, nontoxic appearing   HEENT: normocephalic, atraumatic, anicteric  Respiratory: no acc muscle use, breathing comfortably  Cardiovascular: S1 and S2 present  Gastrointestinal: normal appearing, nondistended  Extremities: no edema    Laboratory Studies:  CBC:                       8.5    6.92  )-----------( 196      ( 25 Feb 2025 10:12 )             29.1     WBC Trend:  6.92 02-25-25 @ 10:12  7.68 02-23-25 @ 07:55  8.51 02-22-25 @ 07:20  8.36 02-21-25 @ 07:17  7.99 02-20-25 @ 02:05  6.99 02-19-25 @ 06:23    CMP: 02-25    136  |  97  |  20  ----------------------------<  83  3.7   |  28  |  4.28[H]    Ca    8.0[L]      25 Feb 2025 10:12    TPro  5.6[L]  /  Alb  2.4[L]  /  TBili  0.2  /  DBili  x   /  AST  13  /  ALT  7[L]  /  AlkPhos  102  02-25    Creatinine: 4.28 mg/dL (02-25-25 @ 10:12)  Creatinine: 4.16 mg/dL (02-23-25 @ 07:55)  Creatinine: 4.99 mg/dL (02-22-25 @ 07:03)  Creatinine: 3.39 mg/dL (02-21-25 @ 07:16)  Creatinine: 3.21 mg/dL (02-20-25 @ 02:05)  Creatinine: 4.12 mg/dL (02-19-25 @ 06:23)    LIVER FUNCTIONS - ( 25 Feb 2025 10:12 )  Alb: 2.4 g/dL / Pro: 5.6 g/dL / ALK PHOS: 102 U/L / ALT: 7 U/L / AST: 13 U/L / GGT: x           Microbiology: reviewed   Culture - Blood (collected 02-20-25 @ 01:55)  Source: .Blood Blood-Peripheral  Final Report (02-25-25 @ 04:00):    No growth at 5 days    02-21-25 @ 17:26 SARS-CoV-2 NotDetec/Influenza A NotDetec/Influenza B NotDetec/RSV NotDetec    Radiology: reviewed     Medications:  acetaminophen     Tablet .. 650 milliGRAM(s) Oral every 6 hours PRN  acetylcysteine 20%  Inhalation 4 milliLiter(s) Inhalation every 6 hours  albuterol/ipratropium for Nebulization 3 milliLiter(s) Nebulizer every 6 hours  atorvastatin 40 milliGRAM(s) Oral at bedtime  calcium acetate 1334 milliGRAM(s) Oral three times a day with meals  chlorhexidine 4% Liquid 1 Application(s) Topical <User Schedule>  epoetin aleah-epbx (RETACRIT) Injectable 40180 Unit(s) IV Push <User Schedule>  guaiFENesin ER 1200 milliGRAM(s) Oral every 12 hours  HYDROmorphone  Injectable 2 milliGRAM(s) IV Push every 6 hours PRN  hydrOXYzine hydrochloride 25 milliGRAM(s) Oral three times a day  ibuprofen  Tablet. 400 milliGRAM(s) Oral every 12 hours  ketoconazole 2% Shampoo 1 Application(s) Topical <User Schedule>  oxyCODONE    IR 10 milliGRAM(s) Oral every 6 hours PRN  oxyCODONE  ER Tablet 30 milliGRAM(s) Oral every 12 hours  pantoprazole    Tablet 40 milliGRAM(s) Oral before breakfast  sodium chloride 0.9% lock flush 10 milliLiter(s) IV Push every 1 hour PRN    Prior/Completed Antimicrobials:  piperacillin/tazobactam IVPB.  piperacillin/tazobactam IVPB.-  vancomycin  IVPB

## 2025-02-25 NOTE — PROGRESS NOTE ADULT - SUBJECTIVE AND OBJECTIVE BOX
suboptimal shiley function last night (only able to pull from one hub) but able to get through full dialysis session without complication.      VITAL:  T(C): , Max: 36.6 (02-24-25 @ 18:10)  T(F): , Max: 97.8 (02-24-25 @ 18:10)  HR: 97 (02-25-25 @ 00:09)  BP: 89/53 (02-25-25 @ 00:09)  RR: 18 (02-25-25 @ 00:09)  SpO2: 95% (02-25-25 @ 00:09)      PHYSICAL EXAM:  Constitutional: lethargic but alert/no longer confused  HEENT: NCAT, DMM  Neck: Supple, No JVD  Respiratory: CTA-b/l  Cardiovascular: RRR s1s2, no m/r/g  Gastrointestinal: BS+, soft, NT/ND  Extremities: No peripheral edema b/l  Neurological: no focal deficits; strength grossly intact  Back: no CVAT b/l  Skin: No rashes, no nevi  [facility]    LABS:    Na(135)/K(4.2)/Cl(98)/HCO3(24)/BUN(19)/Cr(4.16)Glu(84)/Ca(8.4)/Mg(--)/PO4(--)    02-23 @ 07:55        IMPRESSION: 74M w/ polio, neurogenic bladder/suprapubic catheter, 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 Awaiting AV access as well. Cardiology input appreciated - acceptable CV risk to proceed with AV access surgery    (3)Hyperphosphatemia - improved, now that he is on HD and on Phoslo    (4)Anemia - s/p IV iron; on Retacrit with HD    (5)Ortho - nondisplaced acute on chronic patellar fracture - Ortho input appreciated - recommending conservative therapy as inpatient    (6)PAD - significant disease on CTA - Vascular on board    (7)Dispo - would like to get him accepted to [facility] in [place] for in-center HD for now. Eventually, we would look to try to transition him to home HD.      RECOMMEND:  (1)Next HD tomorrow - no net UF; Retacrit with HD  (2)Conversion of shiley to tunneled cath by IR, today vs tomorrow  (3)Vascular f/u regarding PAD management as well as eventual AV access  (4)Efforts to have patient accepted to [facility] for outside HD          Rafita Denny MD  [facility]  Office/on call physician: [phone]  Cell (7a-7p): [phone]

## 2025-02-25 NOTE — PROGRESS NOTE ADULT - SUBJECTIVE AND OBJECTIVE BOX
SUBJECTIVE:  NAEO. Planned for IR shiley to permacath conversion today.     Vital Signs Last 24 Hrs  T(C): 36.5 (25 Feb 2025 00:09), Max: 36.6 (24 Feb 2025 18:10)  T(F): 97.7 (25 Feb 2025 00:09), Max: 97.8 (24 Feb 2025 18:10)  HR: 97 (25 Feb 2025 00:09) (71 - 97)  BP: 89/53 (25 Feb 2025 00:09) (89/53 - 131/45)  BP(mean): --  RR: 18 (25 Feb 2025 00:09) (18 - 18)  SpO2: 95% (25 Feb 2025 00:09) (95% - 97%)    Parameters below as of 25 Feb 2025 00:09  Patient On (Oxygen Delivery Method): room air        I&O's Detail    24 Feb 2025 07:01  -  25 Feb 2025 07:00  --------------------------------------------------------  IN:  Total IN: 0 mL    OUT:    Indwelling Catheter - Suprapubic (mL): 1050 mL    Other (mL): 0 mL  Total OUT: 1050 mL    Total NET: -1050 mL      Physical Exam:  General: NAD  Neuro: Awake, alert and oriented x3  Resp: Nonlabored breathing on RA  CV: Hemodynamically stable. R IJ shiley site c/d/i   GI/Abd: Soft, obese, nontender. Dressing to [facility] C/D/I. Suprapubic catheter in place  Ext: Palp femoral pulses bilat. BLE atrophic, minimal dorsi/plantarflexion bilaterally. Sensation grossly intact. LLE edematous compared to R, erythema to right toes/forefoot. No active wounds bilat.     LABS:                        8.7    7.68  )-----------( 201      ( 23 Feb 2025 07:55 )             29.0     02-23    135  |  98  |  19  ----------------------------<  84  4.2   |  24  |  4.16[H]    Ca    8.4      23 Feb 2025 07:55        Urinalysis Basic - ( 23 Feb 2025 07:55 )    Color: x / Appearance: x / SG: x / pH: x  Gluc: 84 mg/dL / Ketone: x  / Bili: x / Urobili: x   Blood: x / Protein: x / Nitrite: x   Leuk Esterase: x / RBC: x / WBC x   Sq Epi: x / Non Sq Epi: x / Bacteria: x        RADIOLOGY & ADDITIONAL STUDIES:

## 2025-02-25 NOTE — PROGRESS NOTE ADULT - SUBJECTIVE AND OBJECTIVE BOX
Subjective: Patient seen and examined. No new events except as noted.     REVIEW OF SYSTEMS:    CONSTITUTIONAL: +weakness, fevers or chills  EYES/ENT: No visual changes;  No vertigo or throat pain   NECK: No pain or stiffness  RESPIRATORY: No cough, wheezing, hemoptysis; No shortness of breath  CARDIOVASCULAR: No chest pain or palpitations  GASTROINTESTINAL: No abdominal or epigastric pain. No nausea, vomiting, or hematemesis; No diarrhea or constipation. No melena or hematochezia.  GENITOURINARY: No dysuria, frequency or hematuria  NEUROLOGICAL: No numbness or weakness  SKIN: No itching, burning, rashes, or lesions   All other review of systems is negative unless indicated above.    MEDICATIONS:  MEDICATIONS  (STANDING):  acetylcysteine 20%  Inhalation 4 milliLiter(s) Inhalation every 6 hours  albuterol/ipratropium for Nebulization 3 milliLiter(s) Nebulizer every 6 hours  atorvastatin 40 milliGRAM(s) Oral at bedtime  calcium acetate 1334 milliGRAM(s) Oral three times a day with meals  chlorhexidine 4% Liquid 1 Application(s) Topical <User Schedule>  epoetin aleah-epbx (RETACRIT) Injectable 17165 Unit(s) IV Push <User Schedule>  guaiFENesin ER 1200 milliGRAM(s) Oral every 12 hours  hydrOXYzine hydrochloride 25 milliGRAM(s) Oral three times a day  ibuprofen  Tablet. 400 milliGRAM(s) Oral every 12 hours  ketoconazole 2% Shampoo 1 Application(s) Topical <User Schedule>  oxyCODONE  ER Tablet 30 milliGRAM(s) Oral every 12 hours  pantoprazole    Tablet 40 milliGRAM(s) Oral before breakfast      PHYSICAL EXAM:  T(C): 37.1 (02-25-25 @ 08:20), Max: 37.1 (02-25-25 @ 08:15)  HR: 77 (02-25-25 @ 08:20) (77 - 97)  BP: 110/66 (02-25-25 @ 08:20) (89/53 - 131/45)  RR: 18 (02-25-25 @ 08:20) (18 - 18)  SpO2: 99% (02-25-25 @ 08:20) (95% - 99%)  Wt(kg): --  I&O's Summary    24 Feb 2025 07:01  -  25 Feb 2025 07:00  --------------------------------------------------------  IN: 0 mL / OUT: 1050 mL / NET: -1050 mL          Appearance: Normal	  HEENT:   Normal oral mucosa, PERRL, EOMI	  Lymphatic: No lymphadenopathy  Cardiovascular: Normal S1 S2, No JVD, No murmurs, No edema  Respiratory: Lungs clear to auscultation	  Psychiatry: A & O x 3, Mood & affect appropriate  Skin: No rashes, No ecchymoses, No cyanosis	  Neurologic: Non-focal  GI/Abd: Soft, obese, nontender. Dressing to [facility] C/D/I. Suprapubic catheter in place  Ext: Palp femoral pulses bilat. BLE atrophic, minimal dorsi/plantarflexion bilaterally. Sensation grossly intact. LLE edematous compared to R, erythema to right toes/forefoot. No active wounds bilat.   LLE:  small superficial abrasion, +edema and +ecchymosis over L knee  +TTP over L knee, no TTP along remainder of extremity; compartments soft  Limited ROM at knee 2/2 pain  No gross varus/valgus laxity, but assessment limited 2/2 pain  Motor: TA/EHL/GS/FHL intact  Sensory: DP/SP/Tib/Jordan/Saph SILT  +DP pulse (symmetric relative to contralateral side), WWP  Vascular: Peripheral pulses palpable 2+ bilaterally      LABS:    CARDIAC MARKERS:                                8.5    6.92  )-----------( 196      ( 25 Feb 2025 10:12 )             29.1     02-25    136  |  97  |  20  ----------------------------<  83  3.7   |  28  |  4.28[H]    Ca    8.0[L]      25 Feb 2025 10:12    TPro  5.6[L]  /  Alb  2.4[L]  /  TBili  0.2  /  DBili  x   /  AST  13  /  ALT  7[L]  /  AlkPhos  102  02-25    proBNP:   Lipid Profile:   HgA1c:   TSH:             TELEMETRY: 	    ECG:  	  RADIOLOGY:   DIAGNOSTIC TESTING:  [ ] Echocardiogram:  [ ]  Catheterization:  [ ] Stress Test:    OTHER:

## 2025-02-25 NOTE — PROGRESS NOTE ADULT - ASSESSMENT
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 The initial plan was that he would present to the [facility] ER Monday 2/17 for HD initiation. However, day of admission, he fell and sustained trauma to his knee. Called to consult for cough, elevated R hemidiaphragm.

## 2025-02-25 NOTE — PROGRESS NOTE ADULT - SUBJECTIVE AND OBJECTIVE BOX
Patient is a 74y old  Male who presents with a chief complaint of ESRD requiring HD, uremia (25 Feb 2025 12:23)      SUBJECTIVE / OVERNIGHT EVENTS: ptn states he is hallucinating, but not at present, his MS is at baseline, his pain is controlled, he still needs prn pain meds when he is moved in the bed or for testing or for HD. awaiting Permacath, AVF creation, LE bypass to be d/w Dr. Swenson and the ptn tomorrow. ptn has cardiology clearance  if he opts for surgery    MEDICATIONS  (STANDING):  acetylcysteine 20%  Inhalation 4 milliLiter(s) Inhalation every 6 hours  albuterol/ipratropium for Nebulization 3 milliLiter(s) Nebulizer every 6 hours  atorvastatin 40 milliGRAM(s) Oral at bedtime  calcium acetate 1334 milliGRAM(s) Oral three times a day with meals  chlorhexidine 4% Liquid 1 Application(s) Topical <User Schedule>  epoetin aleah-epbx (RETACRIT) Injectable 30694 Unit(s) IV Push <User Schedule>  guaiFENesin ER 1200 milliGRAM(s) Oral every 12 hours  hydrOXYzine hydrochloride 25 milliGRAM(s) Oral three times a day  ibuprofen  Tablet. 400 milliGRAM(s) Oral every 12 hours  ketoconazole 2% Shampoo 1 Application(s) Topical <User Schedule>  oxyCODONE  ER Tablet 30 milliGRAM(s) Oral every 12 hours  pantoprazole    Tablet 40 milliGRAM(s) Oral before breakfast    MEDICATIONS  (PRN):  acetaminophen     Tablet .. 650 milliGRAM(s) Oral every 6 hours PRN Temp greater or equal to 38C (100.4F), Mild Pain (1 - 3)  HYDROmorphone  Injectable 2 milliGRAM(s) IV Push every 6 hours PRN Severe Pain (7 - 10)  oxyCODONE    IR 10 milliGRAM(s) Oral every 6 hours PRN moderate breakthrough Pain  sodium chloride 0.9% lock flush 10 milliLiter(s) IV Push every 1 hour PRN Pre/post blood products, medications, blood draw, and to maintain line patency      Vital Signs Last 24 Hrs  T(F): 98.8 (02-25-25 @ 08:20), Max: 98.8 (02-25-25 @ 08:20)  HR: 77 (02-25-25 @ 08:20) (77 - 97)  BP: 110/66 (02-25-25 @ 08:20) (89/53 - 131/45)  RR: 18 (02-25-25 @ 08:20) (18 - 18)  SpO2: 99% (02-25-25 @ 08:20) (95% - 99%)  Telemetry:   CAPILLARY BLOOD GLUCOSE        I&O's Summary    24 Feb 2025 07:01  -  25 Feb 2025 07:00  --------------------------------------------------------  IN: 0 mL / OUT: 1050 mL / NET: -1050 mL        PHYSICAL EXAM:  GENERAL: NAD, well-developed  HEAD:  Atraumatic, Normocephalic  EYES: EOMI, PERRLA, conjunctiva and sclera clear  NECK: Supple, No JVD  CHEST/LUNG: Clear to auscultation bilaterally; No wheeze  HEART: Regular rate and rhythm; No murmurs, rubs, or gallops  ABDOMEN: Soft, Nontender, Nondistended; Bowel sounds present  EXTREMITIES:  2+ Peripheral Pulses, No clubbing, cyanosis, or edema  PSYCH: AAOx3  NEUROLOGY: non-focal  SKIN: No rashes or lesions    LABS:                        8.5    6.92  )-----------( 196      ( 25 Feb 2025 10:12 )             29.1     02-25    136  |  97  |  20  ----------------------------<  83  3.7   |  28  |  4.28[H]    Ca    8.0[L]      25 Feb 2025 10:12    TPro  5.6[L]  /  Alb  2.4[L]  /  TBili  0.2  /  DBili  x   /  AST  13  /  ALT  7[L]  /  AlkPhos  102  02-25    PT/INR - ( 25 Feb 2025 10:12 )   PT: 15.0 sec;   INR: 1.31 ratio               Urinalysis Basic - ( 25 Feb 2025 10:12 )    Color: x / Appearance: x / SG: x / pH: x  Gluc: 83 mg/dL / Ketone: x  / Bili: x / Urobili: x   Blood: x / Protein: x / Nitrite: x   Leuk Esterase: x / RBC: x / WBC x   Sq Epi: x / Non Sq Epi: x / Bacteria: x        RADIOLOGY & ADDITIONAL TESTS:    Imaging Personally Reviewed:    Consultant(s) Notes Reviewed:      Care Discussed with Consultants/Other Providers:

## 2025-02-26 NOTE — PROGRESS NOTE ADULT - PROBLEM SELECTOR PLAN 1
Admitted for initiation of HD  s/p shiley placement 2/17  Tolerating HD  s/p RIJ permacath placement with IR 2/25  TTE wnl  Acceptable cardiac risk to proceed with AVF creation.

## 2025-02-26 NOTE — PROGRESS NOTE ADULT - SUBJECTIVE AND OBJECTIVE BOX
Overnight events noted      VITAL:  T(C): , Max: 37.1 (02-25-25 @ 08:15)  T(F): , Max: 98.8 (02-25-25 @ 08:20)  HR: 85 (02-26-25 @ 00:00)  BP: 131/81 (02-26-25 @ 00:00)  BP(mean): 98 (02-25-25 @ 16:25)  RR: 18 (02-26-25 @ 00:00)  SpO2: 98% (02-26-25 @ 00:00)  Wt(kg): --      PHYSICAL EXAM:  Constitutional: lethargic but alert/no longer confused  HEENT: NCAT, DMM  Neck: Supple, No JVD  Respiratory: CTA-b/l  Cardiovascular: RRR s1s2, no m/r/g  Gastrointestinal: BS+, soft, NT/ND  Extremities: No peripheral edema b/l  Neurological: no focal deficits; strength grossly intact  Back: no CVAT b/l  Skin: No rashes, no nevi  Access: [facility]      LABS:                        8.5    6.92  )-----------( 196      ( 25 Feb 2025 10:12 )             29.1     Na(136)/K(3.7)/Cl(97)/HCO3(28)/BUN(20)/Cr(4.28)Glu(83)/Ca(8.0)/Mg(--)/PO4(--)    02-25 @ 10:12  Na(135)/K(4.2)/Cl(98)/HCO3(24)/BUN(19)/Cr(4.16)Glu(84)/Ca(8.4)/Mg(--)/PO4(--)    02-23 @ 07:55      IMPRESSION: 74M w/ polio, neurogenic bladder/suprapubic catheter, 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 Last dialyzed Monday 2/24; due for next HD today    (2)Vascular access - s/p conversion of shiley to tunneled catheter 2/25 with IR. Awaiting AV access    (3)Anemia - s/p IV iron; on Retacrit with HD    (4)Ortho - nondisplaced acute on chronic patellar fracture - Ortho input appreciated - recommending conservative therapy as inpatient    (5)PAD - significant disease on CTA - Vascular on board    (6)Dispo - would like to get him accepted to [facility] in [place] for in-center HD for now. Eventually, we would look to try to transition him to home HD.      RECOMMEND:  (1)Next HD today- no net UF; Retacrit with HD  (2)Vascular f/u regarding PAD management as well as eventual AV access  (3)Efforts to have patient accepted to [facility] for outside HD              Rafita Denny MD  [facility]  Office/on call physician: [phone]  Cell (7a-7a): [phone]       Seen on HD  no pain, no sob      VITAL:  T(C): , Max: 37.1 (02-25-25 @ 08:15)  T(F): , Max: 98.8 (02-25-25 @ 08:20)  HR: 85 (02-26-25 @ 00:00)  BP: 131/81 (02-26-25 @ 00:00)  BP(mean): 98 (02-25-25 @ 16:25)  RR: 18 (02-26-25 @ 00:00)  SpO2: 98% (02-26-25 @ 00:00)  Wt(kg): --      PHYSICAL EXAM:  Constitutional: NAD  HEENT: NCAT, DMM  Neck: Supple, No JVD  Respiratory: CTA-b/l  Cardiovascular: RRR s1s2, no m/r/g  Gastrointestinal: BS+, soft, NT/ND  Extremities: No peripheral edema b/l  Neurological: reduced generalized strength  Back: no CVAT b/l  Skin: No rashes, no nevi  Access: RIJ tunneled cath - accessed      LABS:                        8.5    6.92  )-----------( 196      ( 25 Feb 2025 10:12 )             29.1     Na(136)/K(3.7)/Cl(97)/HCO3(28)/BUN(20)/Cr(4.28)Glu(83)/Ca(8.0)/Mg(--)/PO4(--)    02-25 @ 10:12  Na(135)/K(4.2)/Cl(98)/HCO3(24)/BUN(19)/Cr(4.16)Glu(84)/Ca(8.4)/Mg(--)/PO4(--)    02-23 @ 07:55      IMPRESSION: 74M w/ polio, neurogenic bladder/suprapubic catheter, 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 On HD now, tolerating    (2)Vascular access - s/p conversion of shiley to tunneled catheter 2/25 with IR. Awaiting AV access    (3)Anemia - s/p IV iron; on Retacrit with HD    (4)Ortho - nondisplaced acute on chronic patellar fracture - Ortho input appreciated - recommending conservative therapy as inpatient    (5)PAD - significant disease on CTA - Vascular on board    (6)Dispo - would like to get him accepted to [facility] in [place] for in-center HD for now. Eventually, we would look to try to transition him to home HD.      RECOMMEND:  (1)HD today as ordered - no net UF; Retacrit with HD  (2)Vascular f/u regarding PAD management as well as eventual AV access  (3)Efforts to have patient accepted to [facility] for outside HD              Rafita Denny MD  [facility]  Office/on call physician: [phone]  Cell (7a-7a): [phone]

## 2025-02-26 NOTE — PROGRESS NOTE ADULT - SUBJECTIVE AND OBJECTIVE BOX
[facility]  Infectious Disease  Dr Davis, Dr Eden, Dr Bailey, HAYDEN Zheng Julio César  [phone]  after hours and weekends [phone]    Name: BRIAN DEMPSEY  Age: 74y  Gender: Male  MRN: [id number]    Interval History--  Notes reviewed  states im not getting better       Allergies    No Known Allergies    Intolerances        Medications--  Antibiotics:    Immunologic:  epoetin aleah-epbx (RETACRIT) Injectable 68235 Unit(s) IV Push <User Schedule>    Other:  acetaminophen     Tablet .. PRN  acetylcysteine 20%  Inhalation  albuterol/ipratropium for Nebulization  atorvastatin  calcium acetate  chlorhexidine 4% Liquid  guaiFENesin ER  heparin   Injectable PRN  heparin   Injectable PRN  heparin  Infusion.  HYDROmorphone  Injectable PRN  hydrOXYzine hydrochloride  ibuprofen  Tablet.  ketoconazole 2% Shampoo  oxyCODONE    IR PRN  oxyCODONE  ER Tablet  pantoprazole    Tablet  sodium chloride 0.9% lock flush PRN      Review of Systems--  A 10-point review of systems was obtained.   unchanged     Review of systems otherwise negative except as previously noted.    Physical Examination--  Vital Signs: T(F): 97.3 (02-26-25 @ 10:45), Max: 98.3 (02-26-25 @ 00:00)  HR: 89 (02-26-25 @ 10:45)  BP: 132/56 (02-26-25 @ 10:45)  RR: 18 (02-26-25 @ 10:45)  SpO2: 94% (02-26-25 @ 10:45)  Wt(kg): --  General:ill appearing   HEENT: AT/NC.   Lungs: Clear bilaterally without rales, wheezing or rhonchi  Heart: Regular rate and rhythm. +sm + hd catheter   Abdomen: Bowel sounds present and normoactive. Soft. Nondistended. Nontender. suprapubic cath  Extremities: No cyanosis or clubbing. No edema.   Skin: Warm. Dry. Good turgor. No rash. No vasculitic stigmata.          Laboratory Studies--  CBC                        8.5    8.98  )-----------( 180      ( 26 Feb 2025 07:30 )             28.9       Chemistries  02-26    135  |  97  |  26[H]  ----------------------------<  73  3.9   |  25  |  5.26[H]    Ca    7.4[L]      26 Feb 2025 07:30  Phos  3.0     02-26  Mg     2.1     02-26    TPro  5.4[L]  /  Alb  2.4[L]  /  TBili  0.2  /  DBili  x   /  AST  11  /  ALT  6[L]  /  AlkPhos  104  02-26      Culture Data    Culture - Blood (collected 20 Feb 2025 01:55)  Source: .Blood Blood-Peripheral  Final Report (25 Feb 2025 04:00):    No growth at 5 days

## 2025-02-26 NOTE — PROGRESS NOTE ADULT - ASSESSMENT
74 year old male with CKD, sp polio, paraplegia with associated neurogenic bladder s/p suprapubic catheter who was admitted s/p fall on 2/16.   CKD - ESRD, now s/p DEYA [place] 2/17, on HD  ruled out cellulitis around suprapubic cath  2/20 s/p RRT for tremors and confusion -- pt with chronic tremors although notably worse  hyponatremia    labs sars cov2/rsv/flu neg  mrsa negative   2/20 CXR with clear lungs     temps wnl, pt asx, no leukocytosis, off antibiotics   SPC site with chronic/stable inflammatory changes, no drainage - no sign of SSTI  CTA abd with occlusion of b/l external iliac arteries and b/l superficial femoral arteries with distal reconstitution at popliteal arteries; patent three vessel runoff of RLE with 2 vessel runoff of LLE with occlusion of L mid anterior tibial artery with distal reconstitution  CTH with no acute findings   Bcx negative to date   mental status at baseline now,     s/p vancomycin x1 2/20  s/p zosyn 2/20-2/22      Recommendations:   Continue off antibiotics   Monitor temps/WBC  Vascular surgery following   HD per renal    Continue rest of care per primary team       [facility]  Available on [employer] TEAMS - *PREFERRED*  [phone]  After 5pm on weekdays and all day on weekends - please call [phone]     Thank you for consulting us and involving us in the management of this patients case. In addition to reviewing history, imaging, documents, labs, microbiology, took into account antibiotic stewardship, local antibiogram and infection control strategies and potential transmission issues at time of treatment decision making process.

## 2025-02-26 NOTE — PROGRESS NOTE ADULT - SUBJECTIVE AND OBJECTIVE BOX
Neurology      S; patient seen.       Medications: MEDICATIONS  (STANDING):  acetylcysteine 20%  Inhalation 4 milliLiter(s) Inhalation every 6 hours  albuterol/ipratropium for Nebulization 3 milliLiter(s) Nebulizer every 6 hours  atorvastatin 40 milliGRAM(s) Oral at bedtime  calcium acetate 1334 milliGRAM(s) Oral three times a day with meals  chlorhexidine 4% Liquid 1 Application(s) Topical <User Schedule>  epoetin aleah-epbx (RETACRIT) Injectable 11291 Unit(s) IV Push <User Schedule>  guaiFENesin ER 1200 milliGRAM(s) Oral every 12 hours  hydrOXYzine hydrochloride 25 milliGRAM(s) Oral three times a day  ibuprofen  Tablet. 400 milliGRAM(s) Oral every 12 hours  ketoconazole 2% Shampoo 1 Application(s) Topical <User Schedule>  oxyCODONE  ER Tablet 30 milliGRAM(s) Oral every 12 hours  pantoprazole    Tablet 40 milliGRAM(s) Oral before breakfast    MEDICATIONS  (PRN):  acetaminophen     Tablet .. 650 milliGRAM(s) Oral every 6 hours PRN Temp greater or equal to 38C (100.4F), Mild Pain (1 - 3)  HYDROmorphone  Injectable 2 milliGRAM(s) IV Push every 6 hours PRN Severe Pain (7 - 10)  oxyCODONE    IR 10 milliGRAM(s) Oral every 6 hours PRN moderate breakthrough Pain  sodium chloride 0.9% lock flush 10 milliLiter(s) IV Push every 1 hour PRN Pre/post blood products, medications, blood draw, and to maintain line patency       Vitals:  Vital Signs Last 24 Hrs  T(C): 36.3 (26 Feb 2025 10:45), Max: 36.8 (25 Feb 2025 14:50)  T(F): 97.3 (26 Feb 2025 10:45), Max: 98.3 (26 Feb 2025 00:00)  HR: 89 (26 Feb 2025 10:45) (75 - 93)  BP: 132/56 (26 Feb 2025 10:45) (112/59 - 176/67)  BP(mean): 98 (25 Feb 2025 16:25) (76 - 98)  RR: 18 (26 Feb 2025 10:45) (13 - 23)  SpO2: 94% (26 Feb 2025 10:45) (94% - 100%)    Parameters below as of 26 Feb 2025 10:45  Patient On (Oxygen Delivery Method): room air            General Exam:   General Appearance: Appropriately dressed and in no acute distress       Head: Normocephalic, atraumatic and no dysmorphic features  Ear, Nose, and Throat: Moist mucous membranes  CVS: S1S2+  Resp: No SOB, no wheeze or rhonchi  GI: soft NT/ND  Extremities: LE PVD   Skin: + decub      Neurological Exam:  Mental Status: Awake, alert and oriented x 2.  Able to follow simple and complex verbal commands. Able to name and repeat. fluent speech. No obvious aphasia or dysarthria noted.   Cranial Nerves: PERRL, EOMI, VFFC, sensation V1-V3 intact,  no obvious facial asymmetry, equal elevation of palate, scm/trap 5/5, tongue is midline on protrusion. no obvious papilledema on fundoscopic exam. hearing is grossly intact.   Motor: Normal bulk, tone and strength throughout uppers 4/ lowers 0-/1 5   Sensation: Intact to light touch and pinprick throughout.     Coordination: No dysmetria on FNF   Gait: deferred, wheelchair bound     Data/Labs/Imaging which I personally reviewed.       LABS:                          8.5    8.98  )-----------( 180      ( 26 Feb 2025 07:30 )             28.9     02-26    135  |  97  |  26[H]  ----------------------------<  73  3.9   |  25  |  5.26[H]    Ca    7.4[L]      26 Feb 2025 07:30  Phos  3.0     02-26  Mg     2.1     02-26    TPro  5.4[L]  /  Alb  2.4[L]  /  TBili  0.2  /  DBili  x   /  AST  11  /  ALT  6[L]  /  AlkPhos  104  02-26    LIVER FUNCTIONS - ( 26 Feb 2025 07:30 )  Alb: 2.4 g/dL / Pro: 5.4 g/dL / ALK PHOS: 104 U/L / ALT: 6 U/L / AST: 11 U/L / GGT: x           PT/INR - ( 25 Feb 2025 10:12 )   PT: 15.0 sec;   INR: 1.31 ratio           Urinalysis Basic - ( 26 Feb 2025 07:30 )    Color: x / Appearance: x / SG: x / pH: x  Gluc: 73 mg/dL / Ketone: x  / Bili: x / Urobili: x   Blood: x / Protein: x / Nitrite: x   Leuk Esterase: x / RBC: x / WBC x   Sq Epi: x / Non Sq Epi: x / Bacteria: x              < from: CT Head No Cont (02.20.25 @ 18:47) >    ACC: [id number] EXAM:  CT BRAIN   ORDERED BY: GUY DE LA CRUZ     PROCEDURE DATE:  02/20/2025          INTERPRETATION:  CLINICAL INDICATION: Altered mental status    TECHNIQUE: Axial CT scanning of the brain was obtained from the skull   base to the vertex without the administration of intravenous contrast.   Reformatted coronal and sagittal images were subsequently obtained and   reviewed.    COMPARISON: None    FINDINGS:  There is no CT evidence of acute transcortical infarct. Age-related   involutional changes and chronic microvascular ischemic changes.    There is no hydrocephalus, mass effect, or acute intracranial hemorrhage.   No extra-axial collection. Basal cisterns are patent.    The visualized paranasal sinuses and mastoid air cells are clear.    The calvarium is intact.    IMPRESSION:  No evidence of acute transcortical infarct, acute intracranial   hemorrhage, or mass effect.    --- End of Report ---            CORTNEY REARDON MD; Attending Radiologist  This document has been electronically signed. Feb 20 2025  7:07PM    < end of copied text >

## 2025-02-26 NOTE — PROGRESS NOTE ADULT - PROBLEM SELECTOR PLAN 3
-Neuro following  -ABG 2/20 acceptable  -Pt lethargic 2/22, ABG never sent but AMS appears to be improving   -VBG this AM acceptable.  -on 2/26: he remains alert and awake and  his  pain  is b chela controlled:  need to watch for his resp and co2:

## 2025-02-26 NOTE — CHART NOTE - NSCHARTNOTEFT_GEN_A_CORE
Patient with severe PVD , RT leg with no pulses and marked discoloration . Dr. Swenson requested a heparin gtt and plan for angiogram . D/W Dr Martinez.

## 2025-02-26 NOTE — ADVANCED PRACTICE NURSE CONSULT - REASON FOR CONSULT
Requested by staff to assess skin status: sacrum. PMH is noted:  74M with history of HTN, polio with multiple subsequent problems including LE weakness requiring wheelchair, neurogenic bladder s/p suprapubic catheter, and colostomy s/p iatrogenic rectal injury 2/2023, and CKD 5 presenting to the ED with weakness and falls. Admitted for initiation of HD.

## 2025-02-26 NOTE — PROGRESS NOTE ADULT - PROBLEM SELECTOR PLAN 1
-CXR with elevated R hemidiaphragm (not present on CXR in December 2024)  -Low grade temp, cough  -CT chest with LLL, RLL GGO, mucoid impacted airways. No clear PNA. Monitoring off ABX per ID  -Suggest Duoneb q6h, Mucinex 1020 mg PO BID   -Mucomyst x5 days   -Incentive spirometry   -Normoxic, keep sats >90% with o2 PRN.  vbg on 2/25 with mild hypercarbic resp acidosis:  receiving narcotics and he is obese too : need to folow closely as he may need bipap if  his co2 cont to increase;  al ABG in am

## 2025-02-26 NOTE — PROGRESS NOTE ADULT - ASSESSMENT
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 The initial plan was that he would present to the [facility] ER Monday 2/17 for HD initiation. However, day of admission, he fell and sustained trauma to his knee. Called to consult for cough, elevated R hemidiaphragm.

## 2025-02-26 NOTE — PROGRESS NOTE ADULT - ASSESSMENT
74M with history of HTN, polio with multiple subsequent problems including LE weakness requiring wheelchair, neurogenic bladder s/p suprapubic catheter, and colostomy s/p iatrogenic rectal injury 2/2023, and CKD 5 presenting to the ED with weakness and falls. Admitted for initiation of HD now s/p R IJ shiley placement on 2/17/25. Needs long term access per nephro     Plan:  - Pending surgical planning given 2 concurrent vascular issues   - CT Angio A/P obtained, reviewed  - AVF creation planning; vein mapping reviewed  - Please preserve nondominant arm (left side, no blood draws etc.)   - Medical and cardiac clearances documented for future AVF creation and cardiac clearance of LE procedure.    Vascular Surgery   [id number]

## 2025-02-26 NOTE — PROGRESS NOTE ADULT - SUBJECTIVE AND OBJECTIVE BOX
Vascular Surgery Daily Progress Note    Subjective: s/p RIJ permacath placement with IR yesterday. NAEO.        Vital Signs Last 24 Hrs  T(C): 36.8 (26 Feb 2025 00:00), Max: 37.1 (25 Feb 2025 08:15)  T(F): 98.3 (26 Feb 2025 00:00), Max: 98.8 (25 Feb 2025 08:20)  HR: 85 (26 Feb 2025 00:00) (77 - 93)  BP: 131/81 (26 Feb 2025 00:00) (110/66 - 176/67)  BP(mean): 98 (25 Feb 2025 16:25) (76 - 98)  RR: 18 (26 Feb 2025 00:00) (13 - 23)  SpO2: 98% (26 Feb 2025 00:00) (94% - 100%)    Parameters below as of 26 Feb 2025 00:00  Patient On (Oxygen Delivery Method): room air      I&O's Summary      Physical Exam:  General: NAD  Neuro: Awake, alert and oriented x3  Resp: Nonlabored breathing on RA  CV: Hemodynamically stable.   GI/Abd: Soft, obese, nontender. Dressing to LLQ C/D/I. Suprapubic catheter in place  Ext: Palp femoral pulses bilat. BLE atrophic, minimal dorsi/plantarflexion bilaterally. Sensation grossly intact. LLE edematous compared to R, erythema to right toes/forefoot. No active wounds bilat.     LABS:                        8.5    6.92  )-----------( 196      ( 25 Feb 2025 10:12 )             29.1     02-25    136  |  97  |  20  ----------------------------<  83  3.7   |  28  |  4.28[H]    Ca    8.0[L]      25 Feb 2025 10:12    TPro  5.6[L]  /  Alb  2.4[L]  /  TBili  0.2  /  DBili  x   /  AST  13  /  ALT  7[L]  /  AlkPhos  102  02-25    PT/INR - ( 25 Feb 2025 10:12 )   PT: 15.0 sec;   INR: 1.31 ratio

## 2025-02-26 NOTE — CHART NOTE - NSCHARTNOTEFT_GEN_A_CORE
Patient seen and examined with Dr. Swenson.   Primary team and vascular team have increased concern over the condition of his lower extremities, related to PAD, particularly the RLE with the beginning of heel breakdown.   Re-examined with Dr. Swenson- non-palpable DP, PT, or fem. Mottling of b/l feet, right worse than left.     Extensive conversation with patient, patient's wife, and daughter regarding next steps. Primary team NP present as well. Then also discussed with another daughter on the phone.   Although, vascular surgery was initially consulted for AVF creation, priority is addressing the PAD worsening in the lower extremities. Patient has a permacath and continue to receive HD through there, with AVF creation delayed after the more impending concern.   Discussed that symptoms of pain and numbness are likely unrelated to patient's polio diagnosis and likely a symptom of developing chronic PAD.   Dr. Swenson explained he would like to preform an angiogram, possible angioplasty, possible stent. Depending on findings of angiogram, patient may require a femoral-femoral bypass. Not a candidate for an aorto-bifemoral bypass given extensive abdominal surgical history in remote history.     Immediate next steps include starting patient on heparin gtt. Will find OR availability - possible Friday vs Monday.   Dr. Swenson encouraged family to discuss and vascular surgery team to re-eval tomorrow.     Plan:  - hep gtt  - please document medical and cardiac clearances for possible angiogram on Friday vs Monday (may require bypass graft as well)    Vascular Surgery  [phone]

## 2025-02-26 NOTE — PROGRESS NOTE ADULT - ASSESSMENT
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 He is well known to me from multiple recent admisions  s/p admissions at [facility] 12/20-12/25/24 and 2/4-2/7 with SONDRA on CKD with associated uremic symptoms.   At the last admission he had expressed strong interest to try to hold off with HD intiation but he has been getting worse clinically at home.  His SONDRA and uremic symptoms significantly improved after the first admission; they improved a to mild extent during the 2nd admission to the point where he was able to be discharged without HD. Since then, however, he has worsened further.   He has been suffering from progressively worsening diffuse pruritus, loss of appetite, and generalized weakness and falls.   His nephrologist and PCP has been speaking with him and his family over the past few days,   The initial plan was that he would present to the [facility] ER Monday 2/17 (ie tomorrow) for HD initiation. Today, however, he fell and sustained trauma to his knee. Given the fall and concern for knee fracture, he presented to the ER today. multiple xrays show no Fractures.      CKD5, uremia, requiring initiation of HD  Rash, seborrheic dermatitis  Pruritis  Anemia  PAD  SP catheter, chronic  Left inferior pole acute on chronic patella Fx    plan  - HD via RIJ permacath    -2/26:  ptn is sleeping, pain is controlled, he was seen by wound care and vascular attending. planning on angiogram 2/2 severe PAD in LE on CTA. he also spoke to HCP. ptn and HCP in agreement. ptn was started on HEPARIN drip as per vascular recs. RIJ permacath done 2/25    - 2/25: ptn states he is hallucinating, but not at present, his MS is at baseline, his pain is controlled, he still needs prn pain meds when he is moved in the bed or for testing or for HD. awaiting Permacath, AVF creation, LE bypass to be d/w Dr. Swenson and the ptn tomorrow. ptn has cardiology clearance  if he opts for. Ptn has sacral decubiti and posterior thigh and buttock decibiti and b/l heel decubiti. Z flow bootie d/w RN, get wound care consult    -2/24: pain is controlled  if the LLE is immobile and fully extended. doesn't tolerate the knee immobilizer. has a medium condyle and acute on chronic patella fx in LEFT knee. as per vascular ptn will need iliac stent  w a fem-fem bypass, possible axillo-femoral bypass. for this surgery he would need cardiac work up . more pressing surgery is LUE AVF creation,  in IR will arrange for Permacath. for pain control: Motrin 400 mg q12H, Oxy ER 30 mg q12H, Oxy IR 10 for mod pain and dilaudid 2 mg iv for severe pain. still has pruritis though improved, will raise atarax 25 mg to tid. plan of care d/w daughter Norma Persaud , ptn and his wife. Also d/w renal, card, vascular, ACP    -2/23: Daughter Cori is the HCP, she and the ptn filled out the paperwork. daily plan and findings d/w her and the ptn. MS is at abseline, c/o b/l LE foot pain and Left Knee pain. xrays of left knee: cannot r/o acute on chronic inferior pole patellar Fx. knee immobilizer is on, awaiting ortho consult. doubt needs any intervention awaiting Ct chest today, will add CT Left knee. ptn states itching has recurred, severe pain has recurred. will resume Atatrax ( 25 mg bid) and Oxycodone ER , but at a lower dose 15 mg q12H. cont prn analgesics. HD as per renal, awaiting Vascular consult f/u re CTA A/L &LE and recs. ptn also wants AVF surgery on this admission. Coughing has stopped    - 2/22: ptn is drowsy but arousable, calmer today, answers questions appropriately. he is pain free, he stopped coughing, he denies having pruritis: will DC:  OXY ER, Atarax, Tessalon perles.     -  2/21: ptn is tearful, a bit confused, coughing, recognizes me and family at bedside. GOC d/w daughter and wife. AMS prob delirium 2/2 acute illness +/- opiods, lyrica, atarac. will lower atarak to tid, dc lyrica, cont Oxy 2/2 ptn has severe LE pain. neuro called for eval. Head ct done yesterday w no acute findings. CTA A/P w LE run fof: severe PAD, vascular to follow up on plan fo care. plan for HD tomorrow and next week place on tiw schedule of MWF. will need tunneled catheter prior to DC and will d/w vascular scheduling of AVF. ptn wants all to be done while inptn. daughter wants to be HCP as per ptn's wishes. she was given paperwork to get it signed and witnessed and will present to nursing thereafter. details of findings and complaints and plan of care d/w daughter and wife. spent 60 min. RVP ordered. start mucinex , tessalon perles, duonebs, get CT chest to r/o PNA. pulm called    -  2/20:  ptn had RRT 2/2 AMS and tremors. no SZ, no LOC. noted to have Na 123( hyponatremia), given 500 cc NS. Gabapentin DCed. ptn had been taking gabapentin at home PTA. Pain is controlled. Pruritis resolved, tolerating HD otherwise.     - Pain management:  cont Oxycodone 10 IR q6H,  DIlaudid prn severe pain 2 mg q4H prn.   - cont outptn meds  - DVT ppx w HSC

## 2025-02-26 NOTE — ADVANCED PRACTICE NURSE CONSULT - ASSESSMENT
the pt was encountered on [facility]- Mr Delgado was awake, slightly lethargic, stating "I just got pain medication and it makes me groggy." His wife was at the bedside. the pt is in a low air-loss surface and is being assisted with T&P using the hover mat. As he was a/w a pressure injury, will request a nutrition consult for further evaluation.  the b/l heels are being off-loaded on a pillow. the pt has a splint to his LLE. the right foot is cool to touch with erythema and mottling of the toes - unable to palpate the pedal pulse- Vascular is following and is aware.   the b/l heels  present with intact bullae- pt states that his right heel is painful. the bulla on the right measures 3cmx 5cmx 0cm and the bulla on the left measures 6cmx 7cmx 0cm. As pt has PAD, suggest that these may be r/t the pts circulation rather than pressure   on the right buttocks is an unstageable pressure injury noted as a deep tissue injury in admission. the wound measures 6cmx 5cm x0cm and presents as a dry eschar. the periwound skin presents with non-blanchable deep purple discoloration of the skin. on the right ischium is an evolving deep tissue measuring 2cmx 4cmx 0cm with 50% necrotic tissue and 50% moist red tissue.  the pt has a colostomy that is diverting stool from the skin and a suprapubic catheter to divert urine.   Education was provided to the pt/spouse re: skin condition, tx plan and PI prevention.I also spoke to the pts daughter over the telephone to inform her of the skin assessment- Cori Delgado.

## 2025-02-26 NOTE — PROGRESS NOTE ADULT - SUBJECTIVE AND OBJECTIVE BOX
Patient is a 74y old  Male who presents with a chief complaint of ESRD requiring HD, uremia (26 Feb 2025 14:48)      SUBJECTIVE / OVERNIGHT EVENTS: ptn is sleeping, pain is controlled, he was seen by wound care and vascular attending. planning on angiogram 2/2 severe PAD in LE on CTA. he also spoke to HCP. ptn and HCP in agreement. ptn was started on HEPARIN drip as per vascular recs. RIJ permacath done 2/25    MEDICATIONS  (STANDING):  acetylcysteine 20%  Inhalation 4 milliLiter(s) Inhalation every 6 hours  albuterol/ipratropium for Nebulization 3 milliLiter(s) Nebulizer every 6 hours  atorvastatin 40 milliGRAM(s) Oral at bedtime  calcium acetate 1334 milliGRAM(s) Oral three times a day with meals  chlorhexidine 4% Liquid 1 Application(s) Topical <User Schedule>  epoetin aleah-epbx (RETACRIT) Injectable 84203 Unit(s) IV Push <User Schedule>  guaiFENesin ER 1200 milliGRAM(s) Oral every 12 hours  heparin  Infusion.  Unit(s)/Hr (17 mL/Hr) IV Continuous <Continuous>  hydrOXYzine hydrochloride 25 milliGRAM(s) Oral three times a day  ibuprofen  Tablet. 400 milliGRAM(s) Oral every 12 hours  ketoconazole 2% Shampoo 1 Application(s) Topical <User Schedule>  oxyCODONE  ER Tablet 30 milliGRAM(s) Oral every 12 hours  pantoprazole    Tablet 40 milliGRAM(s) Oral before breakfast    MEDICATIONS  (PRN):  acetaminophen     Tablet .. 650 milliGRAM(s) Oral every 6 hours PRN Temp greater or equal to 38C (100.4F), Mild Pain (1 - 3)  heparin   Injectable 7500 Unit(s) IV Push every 6 hours PRN For aPTT less than 40  heparin   Injectable 3500 Unit(s) IV Push every 6 hours PRN For aPTT between 40 - 57  HYDROmorphone  Injectable 2 milliGRAM(s) IV Push every 6 hours PRN Severe Pain (7 - 10)  oxyCODONE    IR 10 milliGRAM(s) Oral every 6 hours PRN moderate breakthrough Pain  sodium chloride 0.9% lock flush 10 milliLiter(s) IV Push every 1 hour PRN Pre/post blood products, medications, blood draw, and to maintain line patency      Vital Signs Last 24 Hrs  T(F): 98 (02-26-25 @ 11:00), Max: 98.3 (02-26-25 @ 00:00)  HR: 88 (02-26-25 @ 11:00) (75 - 89)  BP: 97/54 (02-26-25 @ 11:00) (97/54 - 132/56)  RR: 18 (02-26-25 @ 11:00) (18 - 18)  SpO2: 95% (02-26-25 @ 11:00) (94% - 98%)  Telemetry:   CAPILLARY BLOOD GLUCOSE        I&O's Summary    26 Feb 2025 07:01  -  26 Feb 2025 17:51  --------------------------------------------------------  IN: 800 mL / OUT: 800 mL / NET: 0 mL        PHYSICAL EXAM:  GENERAL: NAD, well-developed  HEAD:  Atraumatic, Normocephalic  EYES: EOMI, PERRLA, conjunctiva and sclera clear  NECK: Supple, No JVD  CHEST/LUNG: Clear to auscultation bilaterally; No wheeze  HEART: Regular rate and rhythm; No murmurs, rubs, or gallops  ABDOMEN: Soft, Nontender, Nondistended; Bowel sounds present  EXTREMITIES:  2+ Peripheral Pulses, No clubbing, cyanosis, or edema  PSYCH: AAOx3  NEUROLOGY: non-focal  SKIN: No rashes or lesions    LABS:                        8.5    8.98  )-----------( 180      ( 26 Feb 2025 07:30 )             28.9     02-26    135  |  97  |  26[H]  ----------------------------<  73  3.9   |  25  |  5.26[H]    Ca    7.4[L]      26 Feb 2025 07:30  Phos  3.0     02-26  Mg     2.1     02-26    TPro  5.4[L]  /  Alb  2.4[L]  /  TBili  0.2  /  DBili  x   /  AST  11  /  ALT  6[L]  /  AlkPhos  104  02-26    PT/INR - ( 25 Feb 2025 10:12 )   PT: 15.0 sec;   INR: 1.31 ratio               Urinalysis Basic - ( 26 Feb 2025 07:30 )    Color: x / Appearance: x / SG: x / pH: x  Gluc: 73 mg/dL / Ketone: x  / Bili: x / Urobili: x   Blood: x / Protein: x / Nitrite: x   Leuk Esterase: x / RBC: x / WBC x   Sq Epi: x / Non Sq Epi: x / Bacteria: x        RADIOLOGY & ADDITIONAL TESTS:    Imaging Personally Reviewed:    Consultant(s) Notes Reviewed:      Care Discussed with Consultants/Other Providers:

## 2025-02-26 NOTE — PROGRESS NOTE ADULT - SUBJECTIVE AND OBJECTIVE BOX
Subjective: Patient seen and examined. No new events except as noted.     REVIEW OF SYSTEMS:    CONSTITUTIONAL: +weakness, fevers or chills  EYES/ENT: No visual changes;  No vertigo or throat pain   NECK: No pain or stiffness  RESPIRATORY: No cough, wheezing, hemoptysis; No shortness of breath  CARDIOVASCULAR: No chest pain or palpitations  GASTROINTESTINAL: No abdominal or epigastric pain. No nausea, vomiting, or hematemesis; No diarrhea or constipation. No melena or hematochezia.  GENITOURINARY: No dysuria, frequency or hematuria  NEUROLOGICAL: No numbness or weakness  SKIN: No itching, burning, rashes, or lesions   All other review of systems is negative unless indicated above.    MEDICATIONS:  MEDICATIONS  (STANDING):  acetylcysteine 20%  Inhalation 4 milliLiter(s) Inhalation every 6 hours  albuterol/ipratropium for Nebulization 3 milliLiter(s) Nebulizer every 6 hours  atorvastatin 40 milliGRAM(s) Oral at bedtime  calcium acetate 1334 milliGRAM(s) Oral three times a day with meals  chlorhexidine 4% Liquid 1 Application(s) Topical <User Schedule>  epoetin aleah-epbx (RETACRIT) Injectable 25042 Unit(s) IV Push <User Schedule>  guaiFENesin ER 1200 milliGRAM(s) Oral every 12 hours  hydrOXYzine hydrochloride 25 milliGRAM(s) Oral three times a day  ibuprofen  Tablet. 400 milliGRAM(s) Oral every 12 hours  ketoconazole 2% Shampoo 1 Application(s) Topical <User Schedule>  oxyCODONE  ER Tablet 30 milliGRAM(s) Oral every 12 hours  pantoprazole    Tablet 40 milliGRAM(s) Oral before breakfast      PHYSICAL EXAM:  T(C): 36.2 (02-26-25 @ 07:20), Max: 36.8 (02-25-25 @ 13:35)  HR: 75 (02-26-25 @ 07:20) (75 - 93)  BP: 124/60 (02-26-25 @ 07:20) (112/59 - 176/67)  RR: 18 (02-26-25 @ 07:20) (13 - 23)  SpO2: 95% (02-26-25 @ 07:20) (94% - 100%)  Wt(kg): --  I&O's Summary    Height (cm): 172.7 (02-25 @ 14:50)  Weight (kg): 90.7 (02-25 @ 14:50)  BMI (kg/m2): 30.4 (02-25 @ 14:50)  BSA (m2): 2.04 (02-25 @ 14:50)    Appearance: Normal	  HEENT:   Normal oral mucosa, PERRL, EOMI	  Lymphatic: No lymphadenopathy  Cardiovascular: Normal S1 S2, No JVD, No murmurs, No edema  Respiratory: Lungs clear to auscultation	  Psychiatry: A & O x 3, Mood & affect appropriate  Skin: No rashes, No ecchymoses, No cyanosis	  Neurologic: Non-focal  GI/Abd: Soft, obese, nontender. Dressing to Q C/D/I. Suprapubic catheter in place  Ext: Palp femoral pulses bilat. BLE atrophic, minimal dorsi/plantarflexion bilaterally. Sensation grossly intact. LLE edematous compared to R, erythema to right toes/forefoot. No active wounds bilat.   LLE:  small superficial abrasion, +edema and +ecchymosis over L knee  +TTP over L knee, no TTP along remainder of extremity; compartments soft  Limited ROM at knee 2/2 pain  No gross varus/valgus laxity, but assessment limited 2/2 pain  Motor: TA/EHL/GS/FHL intact  Sensory: DP/SP/Tib/Jordan/Saph SILT  +DP pulse (symmetric relative to contralateral side), WWP  Vascular: Peripheral pulses palpable 2+ bilaterally      LABS:    CARDIAC MARKERS:                                8.5    8.98  )-----------( 180      ( 26 Feb 2025 07:30 )             28.9     02-26    135  |  97  |  26[H]  ----------------------------<  73  3.9   |  25  |  5.26[H]    Ca    7.4[L]      26 Feb 2025 07:30  Phos  3.0     02-26  Mg     2.1     02-26    TPro  5.4[L]  /  Alb  2.4[L]  /  TBili  0.2  /  DBili  x   /  AST  11  /  ALT  6[L]  /  AlkPhos  104  02-26    proBNP:   Lipid Profile:   HgA1c:   TSH:             TELEMETRY: 	    ECG:  	  RADIOLOGY:   DIAGNOSTIC TESTING:  [ ] Echocardiogram:  [ ]  Catheterization:  [ ] Stress Test:    OTHER:

## 2025-02-26 NOTE — PROGRESS NOTE ADULT - ASSESSMENT
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  came in after fall and for HD.     2/20 CTH neg   \Na 123 --> now 133   A1c 5.7   TSH WNL 3.46   o/e 2/21 AAOx2, uppers 4/5, lowers limited .  2/23; family bedside upset that he has pain in leg after he was moved.  patient going for imaging now.   s/p R IJ permacath by IR 2/25     Imrpssion  1) AMS, waxing and waing , likely multifactorial from infection, metabolic/electrolyte derrangements/ delerium / medication effect , ureemia which is imporving   2) polio  3) fall 2/2 weakness  4) hyponatremia to 123 2/20  improved 133 -->136    - HD today   - f/u vascular   - was on zosyn for infection ; now off   - lyrica 25mg BID; stopped   - was getting oxycodone ER 30mg BID and oxy PRN IR i10 and dilauded PRN ;   - f/u psych   - limit sedating meds   - continue to monitor and correct metabolic derrangements .  - delerium precuations.   - frequent re orientation  - check b12, RPR, ammonia level   - will consdier MRI brain or EEG if doesn't improve but seems to have been improving   - PT/OT   - check FS, glucose control <180  - GI/DVT ppx  - Thank you for allowing me to participate in the care of this patient. Call with questions.      Paul Limon MD  Vascular Neurology  Office: [phone]

## 2025-02-27 NOTE — PROGRESS NOTE ADULT - SUBJECTIVE AND OBJECTIVE BOX
SUBJECTIVE: NAEO.     Vital Signs Last 24 Hrs  T(C): 36.6 (27 Feb 2025 07:45), Max: 37.3 (26 Feb 2025 17:20)  T(F): 97.8 (27 Feb 2025 07:45), Max: 99.1 (26 Feb 2025 17:20)  HR: 82 (27 Feb 2025 07:45) (65 - 89)  BP: 110/56 (27 Feb 2025 07:45) (93/53 - 132/56)  BP(mean): --  RR: 18 (27 Feb 2025 07:45) (17 - 18)  SpO2: 97% (27 Feb 2025 07:45) (92% - 97%)    Parameters below as of 27 Feb 2025 07:45  Patient On (Oxygen Delivery Method): room air        I&O's Detail    26 Feb 2025 07:01  -  27 Feb 2025 07:00  --------------------------------------------------------  IN:    Other (mL): 800 mL  Total IN: 800 mL    OUT:    Indwelling Catheter - Suprapubic (mL): 100 mL    Other (mL): 800 mL  Total OUT: 900 mL    Total NET: -100 mL      Physical Exam:  General: NAD  Neuro: Awake, alert and oriented x3  Resp: Nonlabored breathing on RA  CV: Hemodynamically stable.   GI/Abd: Soft, obese, nontender. Dressing to [facility] C/D/I. Suprapubic catheter in place  Vascular: Nonpalpable femoral pulses. Nonpalpable DP/PT b/l.   Extremities: BLE atrophic, minimal dorsi/plantarflexion bilaterally. Sensation diminished, LLE edematous compared to R, mottling of right toes/forefoot/heel, No active wounds bilat.     LABS:                        8.6    9.60  )-----------( 164      ( 27 Feb 2025 05:43 )             28.9     02-26    135  |  97  |  26[H]  ----------------------------<  73  3.9   |  25  |  5.26[H]    Ca    7.4[L]      26 Feb 2025 07:30  Phos  3.0     02-26  Mg     2.1     02-26    TPro  5.4[L]  /  Alb  2.4[L]  /  TBili  0.2  /  DBili  x   /  AST  11  /  ALT  6[L]  /  AlkPhos  104  02-26    PT/INR - ( 25 Feb 2025 10:12 )   PT: 15.0 sec;   INR: 1.31 ratio         PTT - ( 27 Feb 2025 05:43 )  PTT:152.9 sec  Urinalysis Basic - ( 26 Feb 2025 07:30 )    Color: x / Appearance: x / SG: x / pH: x  Gluc: 73 mg/dL / Ketone: x  / Bili: x / Urobili: x   Blood: x / Protein: x / Nitrite: x   Leuk Esterase: x / RBC: x / WBC x   Sq Epi: x / Non Sq Epi: x / Bacteria: x        RADIOLOGY & ADDITIONAL STUDIES:

## 2025-02-27 NOTE — PROGRESS NOTE ADULT - SUBJECTIVE AND OBJECTIVE BOX
Overnight events noted      VITAL:  T(C): , Max: 37.3 (02-26-25 @ 17:20)  T(F): , Max: 99.1 (02-26-25 @ 17:20)  HR: 80 (02-27-25 @ 05:30)  BP: 130/76 (02-27-25 @ 05:30)  BP(mean): --  RR: 17 (02-27-25 @ 05:30)  SpO2: 96% (02-27-25 @ 05:30)  Wt(kg): --      PHYSICAL EXAM:  Constitutional: NAD  HEENT: NCAT, DMM  Neck: Supple, No JVD  Respiratory: CTA-b/l  Cardiovascular: RRR s1s2, no m/r/g  Gastrointestinal: BS+, soft, NT/ND  Extremities: No peripheral edema b/l  Neurological: reduced generalized strength  Back: no CVAT b/l  Skin: No rashes, no nevi  Access: RIJ tunneled cath    LABS:                        8.6    9.60  )-----------( 164      ( 27 Feb 2025 05:43 )             28.9     Na(135)/K(3.9)/Cl(97)/HCO3(25)/BUN(26)/Cr(5.26)Glu(73)/Ca(7.4)/Mg(2.1)/PO4(3.0)    02-26 @ 07:30  Na(136)/K(3.7)/Cl(97)/HCO3(28)/BUN(20)/Cr(4.28)Glu(83)/Ca(8.0)/Mg(--)/PO4(--)    02-25 @ 10:12      IMPRESSION: 74M w/ polio, neurogenic bladder/suprapubic catheter, 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 Last dialyzed yesterday; due for next HD tomorrow    (2)Anemia - s/p IV iron; on Retacrit with HD    (3)Ortho - nondisplaced acute on chronic patellar fracture - Ortho input appreciated - recommending conservative therapy as inpatient    (4)PAD - input appreciated - on heparin gtt - potentially for intervention tomorrow vs Monday 3/3    (5)Dispo - for outpt HD at [facility] in [place] upon discharge. Eventually, we would look to try to transition him to home HD.      RECOMMEND:  (1)Hep gtt as ordered  (2)LE angio per Vascular  (3)Next HD tomorrow; Retacrit/Venofer with HD      Rafita Denny MD  [facility]  Office/on call physician: [phone]  Cell (7a-7p): [phone]       Overnight events noted      VITAL:  T(C): , Max: 37.3 (02-26-25 @ 17:20)  T(F): , Max: 99.1 (02-26-25 @ 17:20)  HR: 80 (02-27-25 @ 05:30)  BP: 130/76 (02-27-25 @ 05:30)  BP(mean): --  RR: 17 (02-27-25 @ 05:30)  SpO2: 96% (02-27-25 @ 05:30)  Wt(kg): --      PHYSICAL EXAM:  Constitutional: NAD  HEENT: NCAT, DMM  Neck: Supple, No JVD  Respiratory: CTA-b/l  Cardiovascular: RRR s1s2, no m/r/g  Gastrointestinal: BS+, soft, NT/ND  Extremities: No peripheral edema b/l  Neurological: reduced generalized strength  Back: no CVAT b/l  Skin: No rashes, no nevi  Access: RIJ tunneled cath    LABS:                        8.6    9.60  )-----------( 164      ( 27 Feb 2025 05:43 )             28.9     Na(135)/K(3.9)/Cl(97)/HCO3(25)/BUN(26)/Cr(5.26)Glu(73)/Ca(7.4)/Mg(2.1)/PO4(3.0)    02-26 @ 07:30  Na(136)/K(3.7)/Cl(97)/HCO3(28)/BUN(20)/Cr(4.28)Glu(83)/Ca(8.0)/Mg(--)/PO4(--)    02-25 @ 10:12      IMPRESSION: 74M w/ polio, neurogenic bladder/suprapubic catheter, 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 Last dialyzed yesterday; due for next HD tomorrow. We can start attempting low-level UF with HD.    (2)Anemia - s/p IV iron; on Retacrit with HD    (3)Ortho - nondisplaced acute on chronic patellar fracture - Ortho input appreciated - recommending conservative therapy as inpatient    (4)PAD - input appreciated - on heparin gtt - potentially for intervention tomorrow vs Monday 3/3    (5)Dispo - for outpt HD at [facility] in [place] upon discharge. Eventually, we would look to try to transition him to home HD.      RECOMMEND:  (1)Hep gtt as ordered  (2)LE angio per Vascular  (3)Next HD tomorrow; 0.7kg UF as able; Retacrit with HD      Rafita Denny MD  [facility]  Office/on call physician: [phone]  Cell (7a-7p): [phone]       (+)intermittent LE pain  no sob  wife at bedside    VITAL:  T(C): , Max: 37.3 (02-26-25 @ 17:20)  T(F): , Max: 99.1 (02-26-25 @ 17:20)  HR: 80 (02-27-25 @ 05:30)  BP: 130/76 (02-27-25 @ 05:30)  BP(mean): --  RR: 17 (02-27-25 @ 05:30)  SpO2: 96% (02-27-25 @ 05:30)  Wt(kg): --      PHYSICAL EXAM:  Constitutional: anxious  HEENT: NCAT, DMM  Neck: Supple, No JVD  Respiratory: CTA-b/l  Cardiovascular: RRR s1s2, no m/r/g  Gastrointestinal: BS+, soft, NT/ND  Extremities: No peripheral edema b/l  Neurological: reduced generalized strength  Back: no CVAT b/l  Skin: No rashes, no nevi  Access: RIJ tunneled cath    LABS:                        8.6    9.60  )-----------( 164      ( 27 Feb 2025 05:43 )             28.9     Na(135)/K(3.9)/Cl(97)/HCO3(25)/BUN(26)/Cr(5.26)Glu(73)/Ca(7.4)/Mg(2.1)/PO4(3.0)    02-26 @ 07:30  Na(136)/K(3.7)/Cl(97)/HCO3(28)/BUN(20)/Cr(4.28)Glu(83)/Ca(8.0)/Mg(--)/PO4(--)    02-25 @ 10:12      IMPRESSION: 74M w/ polio, neurogenic bladder/suprapubic catheter, 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 Last dialyzed yesterday; due for next HD tomorrow. We can start attempting low-level UF with HD.    (2)Anemia - s/p IV iron; on Retacrit with HD    (3)Ortho - nondisplaced acute on chronic patellar fracture - Ortho input appreciated - recommending conservative therapy as inpatient    (4)PAD - input appreciated - on heparin gtt - potentially for intervention tomorrow vs Monday 3/3    (5)Dispo - for outpt HD at [facility] in [place] upon discharge. Eventually, we would look to try to transition him to home HD.      RECOMMEND:  (1)Hep gtt as ordered  (2)LE angio per Vascular  (3)Next HD tomorrow; 0.7kg UF as able; Retacrit with HD      Rafita Denny MD  [facility]  Office/on call physician: [phone]  Cell (7a-7p): [phone]

## 2025-02-27 NOTE — PROGRESS NOTE ADULT - ASSESSMENT
74M with history of HTN, polio with multiple subsequent problems including LE weakness requiring wheelchair, neurogenic bladder s/p suprapubic catheter, and colostomy s/p iatrogenic rectal injury 2/2023, and CKD 5 presenting to the ED with weakness and falls. Admitted for initiation of HD now s/p R IJ shiley placement on 2/17/25 and conversion to tunneled catheter on 2/25. Vascular surgery initially consulted for AVF creation; however, patient's worsening PAD with worsening ischemic changes is the more urgent issue.     Plan:  - planning for RLE Angiogram, possible bilateral, possible angioplasty, possible stent placement, possible femoral-femoral bypass on Friday vs Monday pending OR availability  - please document medical and cardiac clearances for the above procedure     - AVF creation is on hold  - c/w hep gtt  - continue HD via tunneled catheter  - continue to protect nondominant left arm  - vascular surgery to follow    Vascular Surgery   [id number]   74M with history of HTN, polio with multiple subsequent problems including LE weakness requiring wheelchair, neurogenic bladder s/p suprapubic catheter, and colostomy s/p iatrogenic rectal injury 2/2023, and CKD 5 presenting to the ED with weakness and falls. Admitted for initiation of HD now s/p R IJ shiley placement on 2/17/25 and conversion to tunneled catheter on 2/25. Vascular surgery initially consulted for AVF creation; however, patient's worsening PAD with worsening ischemic changes is the more urgent issue.     Plan:  - planning for RLE Angiogram, possible bilateral, possible angioplasty, possible stent placement, possible femoral-femoral bypass on Monday pending OR availability  - please document medical and cardiac clearances for the above procedure     - AVF creation is on hold  - pending consent  - c/w hep gtt  - continue HD via tunneled catheter  - continue to protect nondominant left arm  - vascular surgery to follow    Vascular Surgery   [id number]

## 2025-02-27 NOTE — PROGRESS NOTE ADULT - PROBLEM SELECTOR PLAN 1
-CXR with elevated R hemidiaphragm (not present on CXR in December 2024)  -Low grade temp, cough  -CT chest with LLL, RLL GGO, mucoid impacted airways. No clear PNA. Monitoring off ABX per ID  -Suggest Duoneb q6h, Mucinex 1200 mg PO BID   -Mucomyst x5 days   -Incentive spirometry   -Normoxic, keep sats >90% with o2 PRN  -VBG 2/25 with mild hypercarbic respiratory acidosis, monitor closely as pt receiving narcotics, if any change in respiratory status/mental status suggest check ABG.

## 2025-02-27 NOTE — PROGRESS NOTE ADULT - SUBJECTIVE AND OBJECTIVE BOX
Subjective: Patient seen and examined. No new events except as noted.   wife at bedside         REVIEW OF SYSTEMS:    CONSTITUTIONAL: + weakness, fevers or chills  EYES/ENT: No visual changes;  No vertigo or throat pain   NECK: No pain or stiffness  RESPIRATORY: No cough, wheezing, hemoptysis; No shortness of breath  CARDIOVASCULAR: No chest pain or palpitations  GASTROINTESTINAL: No abdominal or epigastric pain. No nausea, vomiting, or hematemesis; No diarrhea or constipation. No melena or hematochezia.  GENITOURINARY: No dysuria, frequency or hematuria  NEUROLOGICAL: No numbness or weakness  SKIN: No itching, burning, rashes, or lesions   All other review of systems is negative unless indicated above.    MEDICATIONS:  MEDICATIONS  (STANDING):  acetylcysteine 20%  Inhalation 4 milliLiter(s) Inhalation every 6 hours  albuterol/ipratropium for Nebulization 3 milliLiter(s) Nebulizer every 6 hours  atorvastatin 40 milliGRAM(s) Oral at bedtime  calcium acetate 1334 milliGRAM(s) Oral three times a day with meals  chlorhexidine 4% Liquid 1 Application(s) Topical <User Schedule>  epoetin aleah-epbx (RETACRIT) Injectable 90945 Unit(s) IV Push <User Schedule>  guaiFENesin ER 1200 milliGRAM(s) Oral every 12 hours  heparin  Infusion.  Unit(s)/Hr (17 mL/Hr) IV Continuous <Continuous>  hydrOXYzine hydrochloride 25 milliGRAM(s) Oral three times a day  ibuprofen  Tablet. 400 milliGRAM(s) Oral every 12 hours  ketoconazole 2% Shampoo 1 Application(s) Topical <User Schedule>  oxyCODONE  ER Tablet 30 milliGRAM(s) Oral every 12 hours  pantoprazole    Tablet 40 milliGRAM(s) Oral before breakfast      PHYSICAL EXAM:  T(C): 36.6 (02-27-25 @ 07:45), Max: 37.3 (02-26-25 @ 17:20)  HR: 82 (02-27-25 @ 07:45) (65 - 89)  BP: 110/56 (02-27-25 @ 07:45) (93/53 - 132/56)  RR: 18 (02-27-25 @ 07:45) (17 - 18)  SpO2: 97% (02-27-25 @ 07:45) (92% - 97%)  Wt(kg): --  I&O's Summary    26 Feb 2025 07:01  -  27 Feb 2025 07:00  --------------------------------------------------------  IN: 800 mL / OUT: 900 mL / NET: -100 mL            Appearance: Normal	  HEENT:   Normal oral mucosa, PERRL, EOMI	  Lymphatic: No lymphadenopathy  Cardiovascular: Normal S1 S2, No JVD, No murmurs, No edema  Respiratory: Lungs clear to auscultation	  Psychiatry: A & O x 3, Mood & affect appropriate  Skin: No rashes, No ecchymoses, No cyanosis	  Neurologic: Non-focal  GI/Abd: Soft, obese, nontender. Dressing to [facility] C/D/I. Suprapubic catheter in place  Ext: Palp femoral pulses bilat. BLE atrophic, minimal dorsi/plantarflexion bilaterally. Sensation grossly intact. LLE edematous compared to R, erythema to right toes/forefoot. No active wounds bilat.   LLE:  small superficial abrasion, +edema and +ecchymosis over L knee  +TTP over L knee, no TTP along remainder of extremity; compartments soft  Limited ROM at knee 2/2 pain  No gross varus/valgus laxity, but assessment limited 2/2 pain  Motor: TA/EHL/GS/FHL intact  Sensory: DP/SP/Tib/Jordan/Saph SILT  +DP pulse (symmetric relative to contralateral side), WWP  Vascular: Peripheral pulses palpable 2+ bilaterally      LABS:    CARDIAC MARKERS:                                8.6    9.60  )-----------( 164      ( 27 Feb 2025 05:43 )             28.9     02-26    135  |  97  |  26[H]  ----------------------------<  73  3.9   |  25  |  5.26[H]    Ca    7.4[L]      26 Feb 2025 07:30  Phos  3.0     02-26  Mg     2.1     02-26    TPro  5.4[L]  /  Alb  2.4[L]  /  TBili  0.2  /  DBili  x   /  AST  11  /  ALT  6[L]  /  AlkPhos  104  02-26    proBNP:   Lipid Profile:   HgA1c:   TSH:             TELEMETRY: 	    ECG:  	  RADIOLOGY:   DIAGNOSTIC TESTING:  [ ] Echocardiogram:  [ ]  Catheterization:  [ ] Stress Test:    OTHER:

## 2025-02-27 NOTE — PROGRESS NOTE ADULT - ASSESSMENT
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  came in after fall and for HD.     2/20 CTH neg   \Na 123 --> now 133   A1c 5.7   TSH WNL 3.46   o/e 2/21 AAOx2, uppers 4/5, lowers limited .  2/23; family bedside upset that he has pain in leg after he was moved.  patient going for imaging now.   s/p R IJ permacath by IR 2/25 2/27 AAOx3     Imrpssion  1) AMS, waxing and waing , likely multifactorial from infection, metabolic/electrolyte derrangements/ delerium / medication effect , ureemia which is imporving   2) polio  3) fall 2/2 weakness  4) hyponatremia to 123 2/20  improved 133 -->136       - f/u vascular needs AVF   - on heparin drip   - was on zosyn for infection ; now off   - was getting oxycodone ER 30mg BID and oxy PRN IR i10 and dilauded PRN ;   - f/u psych   - limit sedating meds   - continue to monitor and correct metabolic derrangements .  - delerium precuations.   - frequent re orientation  - check b12, RPR, ammonia level   - will consdier MRI brain or EEG if doesn't improve but seems to have been improving   - PT/OT   - check FS, glucose control <180  - GI/DVT ppx  - Thank you for allowing me to participate in the care of this patient. Call with questions.    spoke with wife 2/27   Paul Limon MD  Vascular Neurology  Office: [phone]

## 2025-02-27 NOTE — PROGRESS NOTE ADULT - SUBJECTIVE AND OBJECTIVE BOX
Patient is a 74y old  Male who presents with a chief complaint of ESRD requiring HD, uremia (27 Feb 2025 09:56)      SUBJECTIVE / OVERNIGHT EVENTS: plan for angiogram in am with possible angioplasty, possible stent, possible fem-fem bypass. medically cleared for angiogram and possible surgery, stent, angioplasty.     MEDICATIONS  (STANDING):  acetylcysteine 20%  Inhalation 4 milliLiter(s) Inhalation every 6 hours  albuterol/ipratropium for Nebulization 3 milliLiter(s) Nebulizer every 6 hours  atorvastatin 40 milliGRAM(s) Oral at bedtime  calcium acetate 1334 milliGRAM(s) Oral three times a day with meals  chlorhexidine 4% Liquid 1 Application(s) Topical <User Schedule>  epoetin aleah-epbx (RETACRIT) Injectable 32825 Unit(s) IV Push <User Schedule>  guaiFENesin ER 1200 milliGRAM(s) Oral every 12 hours  heparin  Infusion.  Unit(s)/Hr (17 mL/Hr) IV Continuous <Continuous>  hydrOXYzine hydrochloride 25 milliGRAM(s) Oral three times a day  ibuprofen  Tablet. 400 milliGRAM(s) Oral every 12 hours  ketoconazole 2% Shampoo 1 Application(s) Topical <User Schedule>  oxyCODONE  ER Tablet 30 milliGRAM(s) Oral every 12 hours  pantoprazole    Tablet 40 milliGRAM(s) Oral before breakfast    MEDICATIONS  (PRN):  acetaminophen     Tablet .. 650 milliGRAM(s) Oral every 6 hours PRN Temp greater or equal to 38C (100.4F), Mild Pain (1 - 3)  heparin   Injectable 7500 Unit(s) IV Push every 6 hours PRN For aPTT less than 40  heparin   Injectable 3500 Unit(s) IV Push every 6 hours PRN For aPTT between 40 - 57  HYDROmorphone  Injectable 2 milliGRAM(s) IV Push every 6 hours PRN Severe Pain (7 - 10)  sodium chloride 0.9% lock flush 10 milliLiter(s) IV Push every 1 hour PRN Pre/post blood products, medications, blood draw, and to maintain line patency      Vital Signs Last 24 Hrs  T(F): 98.5 (02-27-25 @ 16:36), Max: 98.5 (02-27-25 @ 00:00)  HR: 85 (02-27-25 @ 16:36) (76 - 86)  BP: 100/52 (02-27-25 @ 16:36) (100/52 - 131/70)  RR: 18 (02-27-25 @ 16:36) (17 - 18)  SpO2: 98% (02-27-25 @ 16:36) (95% - 98%)  Telemetry:   CAPILLARY BLOOD GLUCOSE        I&O's Summary    26 Feb 2025 07:01  -  27 Feb 2025 07:00  --------------------------------------------------------  IN: 800 mL / OUT: 900 mL / NET: -100 mL        PHYSICAL EXAM:  GENERAL: NAD, well-developed  HEAD:  Atraumatic, Normocephalic  EYES: EOMI, PERRLA, conjunctiva and sclera clear  NECK: Supple, No JVD  CHEST/LUNG: Clear to auscultation bilaterally; No wheeze  HEART: Regular rate and rhythm; No murmurs, rubs, or gallops  ABDOMEN: Soft, Nontender, Nondistended; Bowel sounds present  EXTREMITIES:  2+ Peripheral Pulses, No clubbing, cyanosis, or edema  PSYCH: AAOx3  NEUROLOGY: non-focal  SKIN: No rashes or lesions    LABS:                        8.6    9.60  )-----------( 164      ( 27 Feb 2025 05:43 )             28.9     02-26    135  |  97  |  26[H]  ----------------------------<  73  3.9   |  25  |  5.26[H]    Ca    7.4[L]      26 Feb 2025 07:30  Phos  3.0     02-26  Mg     2.1     02-26    TPro  5.4[L]  /  Alb  2.4[L]  /  TBili  0.2  /  DBili  x   /  AST  11  /  ALT  6[L]  /  AlkPhos  104  02-26    PTT - ( 27 Feb 2025 14:24 )  PTT:49.7 sec      Urinalysis Basic - ( 26 Feb 2025 07:30 )    Color: x / Appearance: x / SG: x / pH: x  Gluc: 73 mg/dL / Ketone: x  / Bili: x / Urobili: x   Blood: x / Protein: x / Nitrite: x   Leuk Esterase: x / RBC: x / WBC x   Sq Epi: x / Non Sq Epi: x / Bacteria: x        RADIOLOGY & ADDITIONAL TESTS:    Imaging Personally Reviewed:    Consultant(s) Notes Reviewed:      Care Discussed with Consultants/Other Providers:

## 2025-02-27 NOTE — PROGRESS NOTE ADULT - PROBLEM SELECTOR PLAN 2
Nondisplaced L medial femoral condyle fx   As per vascular ptn will need iliac stent  w a fem-fem bypass, possible axillo-femoral bypass  Acceptable cardiac risk to proceed   RCRI 2  ortho following

## 2025-02-27 NOTE — PROGRESS NOTE ADULT - ASSESSMENT
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 The initial plan was that he would present to the [facility] ER Monday 2/17 for HD initiation. However, day of admission, he fell and sustained trauma to his knee. Called to consult for cough, elevated R hemidiaphragm.

## 2025-02-27 NOTE — PROGRESS NOTE ADULT - SUBJECTIVE AND OBJECTIVE BOX
Neurology      S; patient seen. wife at bedside. mental status better        Medications: MEDICATIONS  (STANDING):  acetylcysteine 20%  Inhalation 4 milliLiter(s) Inhalation every 6 hours  albuterol/ipratropium for Nebulization 3 milliLiter(s) Nebulizer every 6 hours  atorvastatin 40 milliGRAM(s) Oral at bedtime  calcium acetate 1334 milliGRAM(s) Oral three times a day with meals  chlorhexidine 4% Liquid 1 Application(s) Topical <User Schedule>  epoetin aleah-epbx (RETACRIT) Injectable 09249 Unit(s) IV Push <User Schedule>  guaiFENesin ER 1200 milliGRAM(s) Oral every 12 hours  heparin  Infusion.  Unit(s)/Hr (17 mL/Hr) IV Continuous <Continuous>  hydrOXYzine hydrochloride 25 milliGRAM(s) Oral three times a day  ibuprofen  Tablet. 400 milliGRAM(s) Oral every 12 hours  ketoconazole 2% Shampoo 1 Application(s) Topical <User Schedule>  oxyCODONE  ER Tablet 30 milliGRAM(s) Oral every 12 hours  pantoprazole    Tablet 40 milliGRAM(s) Oral before breakfast    MEDICATIONS  (PRN):  acetaminophen     Tablet .. 650 milliGRAM(s) Oral every 6 hours PRN Temp greater or equal to 38C (100.4F), Mild Pain (1 - 3)  heparin   Injectable 7500 Unit(s) IV Push every 6 hours PRN For aPTT less than 40  heparin   Injectable 3500 Unit(s) IV Push every 6 hours PRN For aPTT between 40 - 57  HYDROmorphone  Injectable 2 milliGRAM(s) IV Push every 6 hours PRN Severe Pain (7 - 10)  sodium chloride 0.9% lock flush 10 milliLiter(s) IV Push every 1 hour PRN Pre/post blood products, medications, blood draw, and to maintain line patency       Vitals:  Vital Signs Last 24 Hrs  T(C): 36.9 (27 Feb 2025 16:36), Max: 36.9 (26 Feb 2025 20:34)  T(F): 98.5 (27 Feb 2025 16:36), Max: 98.5 (27 Feb 2025 00:00)  HR: 85 (27 Feb 2025 16:36) (76 - 86)  BP: 100/52 (27 Feb 2025 16:36) (100/52 - 131/70)  BP(mean): --  RR: 18 (27 Feb 2025 16:36) (17 - 18)  SpO2: 98% (27 Feb 2025 16:36) (95% - 98%)    Parameters below as of 27 Feb 2025 07:45  Patient On (Oxygen Delivery Method): room air         General Exam:   General Appearance: Appropriately dressed and in no acute distress       Head: Normocephalic, atraumatic and no dysmorphic features  Ear, Nose, and Throat: Moist mucous membranes  CVS: S1S2+  Resp: No SOB, no wheeze or rhonchi  GI: soft NT/ND  Extremities: LE PVD   Skin: + decub      Neurological Exam:  Mental Status: Awake, alert and oriented x 2.  Able to follow simple and complex verbal commands. Able to name and repeat. fluent speech. No obvious aphasia or dysarthria noted.   Cranial Nerves: PERRL, EOMI, VFFC, sensation V1-V3 intact,  no obvious facial asymmetry, equal elevation of palate, scm/trap 5/5, tongue is midline on protrusion. no obvious papilledema on fundoscopic exam. hearing is grossly intact.   Motor: Normal bulk, tone and strength throughout uppers 4/ lowers 0-/1 5   Sensation: Intact to light touch and pinprick throughout.     Coordination: No dysmetria on FNF   Gait: deferred, wheelchair bound     Data/Labs/Imaging which I personally reviewed.        LABS:                          8.6    9.60  )-----------( 164      ( 27 Feb 2025 05:43 )             28.9     02-26    135  |  97  |  26[H]  ----------------------------<  73  3.9   |  25  |  5.26[H]    Ca    7.4[L]      26 Feb 2025 07:30  Phos  3.0     02-26  Mg     2.1     02-26    TPro  5.4[L]  /  Alb  2.4[L]  /  TBili  0.2  /  DBili  x   /  AST  11  /  ALT  6[L]  /  AlkPhos  104  02-26    LIVER FUNCTIONS - ( 26 Feb 2025 07:30 )  Alb: 2.4 g/dL / Pro: 5.4 g/dL / ALK PHOS: 104 U/L / ALT: 6 U/L / AST: 11 U/L / GGT: x           PTT - ( 27 Feb 2025 14:24 )  PTT:49.7 sec  Urinalysis Basic - ( 26 Feb 2025 07:30 )    Color: x / Appearance: x / SG: x / pH: x  Gluc: 73 mg/dL / Ketone: x  / Bili: x / Urobili: x   Blood: x / Protein: x / Nitrite: x   Leuk Esterase: x / RBC: x / WBC x   Sq Epi: x / Non Sq Epi: x / Bacteria: x              < from: CT Head No Cont (02.20.25 @ 18:47) >    ACC: [id number] EXAM:  CT BRAIN   ORDERED BY: GUY DE LA CRUZ     PROCEDURE DATE:  02/20/2025          INTERPRETATION:  CLINICAL INDICATION: Altered mental status    TECHNIQUE: Axial CT scanning of the brain was obtained from the skull   base to the vertex without the administration of intravenous contrast.   Reformatted coronal and sagittal images were subsequently obtained and   reviewed.    COMPARISON: None    FINDINGS:  There is no CT evidence of acute transcortical infarct. Age-related   involutional changes and chronic microvascular ischemic changes.    There is no hydrocephalus, mass effect, or acute intracranial hemorrhage.   No extra-axial collection. Basal cisterns are patent.    The visualized paranasal sinuses and mastoid air cells are clear.    The calvarium is intact.    IMPRESSION:  No evidence of acute transcortical infarct, acute intracranial   hemorrhage, or mass effect.    --- End of Report ---            CORTNEY REARDON MD; Attending Radiologist  This document has been electronically signed. Feb 20 2025  7:07PM    < end of copied text >

## 2025-02-27 NOTE — PROGRESS NOTE ADULT - ASSESSMENT
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 He is well known to me from multiple recent admisions  s/p admissions at [facility] 12/20-12/25/24 and 2/4-2/7 with SONDRA on CKD with associated uremic symptoms.   At the last admission he had expressed strong interest to try to hold off with HD intiation but he has been getting worse clinically at home.  His SONDRA and uremic symptoms significantly improved after the first admission; they improved a to mild extent during the 2nd admission to the point where he was able to be discharged without HD. Since then, however, he has worsened further.   He has been suffering from progressively worsening diffuse pruritus, loss of appetite, and generalized weakness and falls.   His nephrologist and PCP has been speaking with him and his family over the past few days,   The initial plan was that he would present to the [facility] ER Monday 2/17 (ie tomorrow) for HD initiation. Today, however, he fell and sustained trauma to his knee. Given the fall and concern for knee fracture, he presented to the ER today. multiple xrays show no Fractures.      CKD5, uremia, requiring initiation of HD  Rash, seborrheic dermatitis  Pruritis  Anemia  PAD  SP catheter, chronic  Left inferior pole acute on chronic patella Fx    plan  - HD via RIJ permacath  -2/27:  plan for angiogram in am with possible angioplasty, possible stent, possible fem-fem bypass. medically cleared for angiogram and possible surgery, stent, angioplasty. pain is controlled. remains on heparin drip as per vascular. HD as per renal    -2/26:  ptn is sleeping, pain is controlled, he was seen by wound care and vascular attending. planning on angiogram 2/2 severe PAD in LE on CTA. he also spoke to HCP. ptn and HCP in agreement. ptn was started on HEPARIN drip as per vascular recs. RIJ permacath done 2/25    - 2/25: ptn states he is hallucinating, but not at present, his MS is at baseline, his pain is controlled, he still needs prn pain meds when he is moved in the bed or for testing or for HD. awaiting Permacath, AVF creation, LE bypass to be d/w Dr. Swenson and the ptn tomorrow. ptn has cardiology clearance  if he opts for. Ptn has sacral decubiti and posterior thigh and buttock decibiti and b/l heel decubiti. Z flow bootie d/w RN, get wound care consult    -2/24: pain is controlled  if the LLE is immobile and fully extended. doesn't tolerate the knee immobilizer. has a medium condyle and acute on chronic patella fx in LEFT knee. as per vascular ptn will need iliac stent  w a fem-fem bypass, possible axillo-femoral bypass. for this surgery he would need cardiac work up . more pressing surgery is LUE AVF creation,  in IR will arrange for Permacath. for pain control: Motrin 400 mg q12H, Oxy ER 30 mg q12H, Oxy IR 10 for mod pain and dilaudid 2 mg iv for severe pain. still has pruritis though improved, will raise atarax 25 mg to tid. plan of care d/w daughter Norma Persaud , ptn and his wife. Also d/w renal, card, vascular, ACP    -2/23: Daughter Coir is the HCP, she and the ptn filled out the paperwork. daily plan and findings d/w her and the ptn. MS is at abseline, c/o b/l LE foot pain and Left Knee pain. xrays of left knee: cannot r/o acute on chronic inferior pole patellar Fx. knee immobilizer is on, awaiting ortho consult. doubt needs any intervention awaiting Ct chest today, will add CT Left knee. ptn states itching has recurred, severe pain has recurred. will resume Atatrax ( 25 mg bid) and Oxycodone ER , but at a lower dose 15 mg q12H. cont prn analgesics. HD as per renal, awaiting Vascular consult f/u re CTA A/L &LE and recs. ptn also wants AVF surgery on this admission. Coughing has stopped    - 2/22: ptn is drowsy but arousable, calmer today, answers questions appropriately. he is pain free, he stopped coughing, he denies having pruritis: will DC:  OXY ER, Atarax, Tessalon perles.     -  2/21: ptn is tearful, a bit confused, coughing, recognizes me and family at bedside. GOC d/w daughter and wife. AMS prob delirium 2/2 acute illness +/- opiods, lyrica, atarac. will lower atarak to tid, dc lyrica, cont Oxy 2/2 ptn has severe LE pain. neuro called for eval. Head ct done yesterday w no acute findings. CTA A/P w LE run fof: severe PAD, vascular to follow up on plan fo care. plan for HD tomorrow and next week place on tiw schedule of MWF. will need tunneled catheter prior to DC and will d/w vascular scheduling of AVF. ptn wants all to be done while inptn. daughter wants to be HCP as per ptn's wishes. she was given paperwork to get it signed and witnessed and will present to nursing thereafter. details of findings and complaints and plan of care d/w daughter and wife. spent 60 min. RVP ordered. start mucinex , tessalon perles, duonebs, get CT chest to r/o PNA. pulm called    -  2/20:  ptn had RRT 2/2 AMS and tremors. no SZ, no LOC. noted to have Na 123( hyponatremia), given 500 cc NS. Gabapentin DCed. ptn had been taking gabapentin at home PTA. Pain is controlled. Pruritis resolved, tolerating HD otherwise.     - Pain management:  cont Oxycodone 10 IR q6H,  DIlaudid prn severe pain 2 mg q4H prn.   - cont outptn meds  - DVT ppx w HSC

## 2025-02-27 NOTE — PROGRESS NOTE ADULT - PROBLEM SELECTOR PLAN 3
-Neuro following  -ABG 2/20 acceptable  -Pt lethargic 2/22, ABG never sent but AMS appears to be improving   -VBG 2/25 acceptable.

## 2025-02-27 NOTE — PROGRESS NOTE ADULT - SUBJECTIVE AND OBJECTIVE BOX
Date of Service: 02-27-25 @ 09:57    Patient is a 74y old  Male who presents with a chief complaint of ESRD requiring HD, uremia (27 Feb 2025 08:59)      Any change in ROS:   Denies CP, SOB  O2 sats 92% on RA     MEDICATIONS  (STANDING):  acetylcysteine 20%  Inhalation 4 milliLiter(s) Inhalation every 6 hours  albuterol/ipratropium for Nebulization 3 milliLiter(s) Nebulizer every 6 hours  atorvastatin 40 milliGRAM(s) Oral at bedtime  calcium acetate 1334 milliGRAM(s) Oral three times a day with meals  chlorhexidine 4% Liquid 1 Application(s) Topical <User Schedule>  epoetin aleah-epbx (RETACRIT) Injectable 38626 Unit(s) IV Push <User Schedule>  guaiFENesin ER 1200 milliGRAM(s) Oral every 12 hours  heparin  Infusion.  Unit(s)/Hr (17 mL/Hr) IV Continuous <Continuous>  hydrOXYzine hydrochloride 25 milliGRAM(s) Oral three times a day  ibuprofen  Tablet. 400 milliGRAM(s) Oral every 12 hours  ketoconazole 2% Shampoo 1 Application(s) Topical <User Schedule>  oxyCODONE  ER Tablet 30 milliGRAM(s) Oral every 12 hours  pantoprazole    Tablet 40 milliGRAM(s) Oral before breakfast    MEDICATIONS  (PRN):  acetaminophen     Tablet .. 650 milliGRAM(s) Oral every 6 hours PRN Temp greater or equal to 38C (100.4F), Mild Pain (1 - 3)  heparin   Injectable 7500 Unit(s) IV Push every 6 hours PRN For aPTT less than 40  heparin   Injectable 3500 Unit(s) IV Push every 6 hours PRN For aPTT between 40 - 57  HYDROmorphone  Injectable 2 milliGRAM(s) IV Push every 6 hours PRN Severe Pain (7 - 10)  sodium chloride 0.9% lock flush 10 milliLiter(s) IV Push every 1 hour PRN Pre/post blood products, medications, blood draw, and to maintain line patency    Vital Signs Last 24 Hrs  T(C): 36.6 (27 Feb 2025 07:45), Max: 37.3 (26 Feb 2025 17:20)  T(F): 97.8 (27 Feb 2025 07:45), Max: 99.1 (26 Feb 2025 17:20)  HR: 82 (27 Feb 2025 07:45) (65 - 89)  BP: 110/56 (27 Feb 2025 07:45) (93/53 - 132/56)  BP(mean): --  RR: 18 (27 Feb 2025 07:45) (17 - 18)  SpO2: 97% (27 Feb 2025 07:45) (92% - 97%)    Parameters below as of 27 Feb 2025 07:45  Patient On (Oxygen Delivery Method): room air        I&O's Summary    26 Feb 2025 07:01  -  27 Feb 2025 07:00  --------------------------------------------------------  IN: 800 mL / OUT: 900 mL / NET: -100 mL          Physical Exam:   GENERAL: NAD, well-groomed, well-developed  HEENT: VINNY/   Atraumatic, Normocephalic  ENMT: No tonsillar erythema, exudates, or enlargement; Moist mucous membranes, Good dentition, No lesions  NECK: Supple, No JVD, Normal thyroid  CHEST/LUNG: Decreased at bases R>L  CVS: Regular rate and rhythm; No murmurs, rubs, or gallops  GI: : Soft, Nontender, Nondistended; Bowel sounds present  NERVOUS SYSTEM:  Alert & Oriented X3  EXTREMITIES: cool to touch, mottling   LYMPH: No lymphadenopathy noted  SKIN: No rashes or lesions  ENDOCRINOLOGY: No Thyromegaly  PSYCH: Appropriate    Labs:  30                            8.6    9.60  )-----------( 164      ( 27 Feb 2025 05:43 )             28.9                         8.7    8.81  )-----------( 168      ( 26 Feb 2025 21:22 )             28.3                         8.5    8.98  )-----------( 180      ( 26 Feb 2025 07:30 )             28.9                         8.5    6.92  )-----------( 196      ( 25 Feb 2025 10:12 )             29.1     02-26    135  |  97  |  26[H]  ----------------------------<  73  3.9   |  25  |  5.26[H]  02-25    136  |  97  |  20  ----------------------------<  83  3.7   |  28  |  4.28[H]    Ca    7.4[L]      26 Feb 2025 07:30  Ca    8.0[L]      25 Feb 2025 10:12  Phos  3.0     02-26  Mg     2.1     02-26    TPro  5.4[L]  /  Alb  2.4[L]  /  TBili  0.2  /  DBili  x   /  AST  11  /  ALT  6[L]  /  AlkPhos  104  02-26  TPro  5.6[L]  /  Alb  2.4[L]  /  TBili  0.2  /  DBili  x   /  AST  13  /  ALT  7[L]  /  AlkPhos  102  02-25              LIVER FUNCTIONS - ( 26 Feb 2025 07:30 )  Alb: 2.4 g/dL / Pro: 5.4 g/dL / ALK PHOS: 104 U/L / ALT: 6 U/L / AST: 11 U/L / GGT: x           PT/INR - ( 25 Feb 2025 10:12 )   PT: 15.0 sec;   INR: 1.31 ratio         PTT - ( 27 Feb 2025 05:43 )  PTT:152.9 sec  Urinalysis Basic - ( 26 Feb 2025 07:30 )    Color: x / Appearance: x / SG: x / pH: x  Gluc: 73 mg/dL / Ketone: x  / Bili: x / Urobili: x   Blood: x / Protein: x / Nitrite: x   Leuk Esterase: x / RBC: x / WBC x   Sq Epi: x / Non Sq Epi: x / Bacteria: x      Studies  < from: CT Chest No Cont (02.23.25 @ 16:46) >    ACC: [id number] EXAM:  CT CHEST   ORDERED BY:  NELL TOLBERT     PROCEDURE DATE:  02/23/2025          INTERPRETATION:  CLINICAL INFORMATION: 74-year-old male with cough    TECHNIQUE: A volumetric CT acquisition of the chest was obtained from the   thoracic inlet to the upper abdomen, without administration of   intravenous contrast. This CT scan was performed with one or more of the   following dose optimization: iterative reconstruction, automatic exposure   control, and/or manual adjustmentof mAs and kVp according to the   patient's size. 3D MIP and volume-rendered images were created at the   scanner.    COMPARISON: The study was compared to CT chest dated 2/23/2025    FINDINGS:  Lines and Tubes: There is a right-sided central venous catheter with the   tip terminating in distal right brachiocephalic vein.    Mediastinum: The thyroid and thoracic inlet are normal. There is no   enlarged axiliary, mediastinal, or hilar lymph nodes. The heart size is   within normal limits. There isno pericardial effusion. The aorta is   normal in course and caliber, with mild calcified atheromas. Lobar   pulmonary arteries have low attenuation, likely artifactual and due to   beam hardening. The esophagus is unremarkable.    Lung: The central tracheobronchial tree is patent. There is no focal   consolidation. There is mild peribronchial wall thickening most prominent   in the left lower lobe. There are mucoid impaction and airway associated   and subpleural groundglass opacities, more prominent in the left lower   lobe and to a lesser degree in right lower lobe..    Pleura: There is no pleural effusion or pneumothorax.    Abdomen: There is prominence of right renal pelvis. Cholelithiasis is   noted. There is no acute upper abdominal finding.    Bone and Soft Tissue: There is no evidence of osteosclerotic or   osteolytic lesions. There are multilevel degenerative changes of thoracic   spine , with disk space narrowing and osteophytosis. There is mild   subcutaneous edema in left lateral upper abdominal wall.    IMPRESSION:  Mucoid impaction and airway associated subpleural groundglass opacities,   most prominent in left lower lobe and to a lesser degree in right lower   lobe.  Mild bronchial wall thickening, likely inflammatory.    --- End of Report ---          < end of copied text >

## 2025-02-28 ENCOUNTER — APPOINTMENT (OUTPATIENT)
Dept: UROLOGY | Facility: CLINIC | Age: 75
End: 2025-02-28

## 2025-02-28 NOTE — PROGRESS NOTE ADULT - PROBLEM SELECTOR PLAN 3
-Neuro following  -ABG 2/20 acceptable  -Pt lethargic 2/22, ABG never sent but AMS appears to be improving   -ABG this AM with no co2 retention.

## 2025-02-28 NOTE — PROGRESS NOTE ADULT - PROBLEM SELECTOR PLAN 1
-CXR with elevated R hemidiaphragm (not present on CXR in December 2024)  -Low grade temp, cough  -CT chest with LLL, RLL GGO, mucoid impacted airways. No clear PNA. Monitoring off ABX per ID  -Suggest Duoneb q6h, Mucinex 1200 mg PO BID   -Mucomyst x5 days   -Incentive spirometry   -Normoxic, keep sats >90% with o2 PRN  -ABG this AM with no co2 retention.

## 2025-02-28 NOTE — DIETITIAN INITIAL EVALUATION ADULT - ETIOLOGY
decreased ability to consume adequate protein-energy in setting of increased physiological demand for nutrients  in setting of increased physiological demand for nutrients

## 2025-02-28 NOTE — DIETITIAN INITIAL EVALUATION ADULT - SIGNS/SYMPTOMS
ESRD on HD and pressure injury stage >/2 per wound care note  moderate muscle/fat loss, pt meeting <75% of estimated needs >/1 month

## 2025-02-28 NOTE — DIETITIAN INITIAL EVALUATION ADULT - PERTINENT LABORATORY DATA
A1C with Estimated Average Glucose Result: 5.7 % (12-21-24 @ 06:44)  A1C with Estimated Average Glucose Result: 4.9 % (10-09-24 @ 13:30)

## 2025-02-28 NOTE — PROGRESS NOTE ADULT - SUBJECTIVE AND OBJECTIVE BOX
Date of Service: 02-28-25 @ 09:57    Patient is a 74y old  Male who presents with a chief complaint of ESRD requiring HD, uremia (27 Feb 2025 18:06)      Any change in ROS:   No new respiratory events overnight. Denies SOB/CP.      MEDICATIONS  (STANDING):  acetaminophen   IVPB .. 1000 milliGRAM(s) IV Intermittent once  acetylcysteine 20%  Inhalation 4 milliLiter(s) Inhalation every 6 hours  albuterol/ipratropium for Nebulization 3 milliLiter(s) Nebulizer every 6 hours  atorvastatin 40 milliGRAM(s) Oral at bedtime  calcium acetate 1334 milliGRAM(s) Oral three times a day with meals  chlorhexidine 4% Liquid 1 Application(s) Topical <User Schedule>  epoetin aleah-epbx (RETACRIT) Injectable 83962 Unit(s) IV Push <User Schedule>  guaiFENesin ER 1200 milliGRAM(s) Oral every 12 hours  heparin  Infusion.  Unit(s)/Hr (17 mL/Hr) IV Continuous <Continuous>  hydrOXYzine hydrochloride 25 milliGRAM(s) Oral three times a day  ibuprofen  Tablet. 400 milliGRAM(s) Oral every 12 hours  ketoconazole 2% Shampoo 1 Application(s) Topical <User Schedule>  oxyCODONE  ER Tablet 30 milliGRAM(s) Oral every 12 hours  pantoprazole    Tablet 40 milliGRAM(s) Oral before breakfast    MEDICATIONS  (PRN):  acetaminophen     Tablet .. 650 milliGRAM(s) Oral every 6 hours PRN Temp greater or equal to 38C (100.4F), Mild Pain (1 - 3)  heparin   Injectable 7500 Unit(s) IV Push every 6 hours PRN For aPTT less than 40  heparin   Injectable 3500 Unit(s) IV Push every 6 hours PRN For aPTT between 40 - 57  HYDROmorphone  Injectable 2 milliGRAM(s) IV Push every 6 hours PRN Severe Pain (7 - 10)  sodium chloride 0.9% lock flush 10 milliLiter(s) IV Push every 1 hour PRN Pre/post blood products, medications, blood draw, and to maintain line patency    Vital Signs Last 24 Hrs  T(C): 36.8 (28 Feb 2025 07:38), Max: 36.9 (27 Feb 2025 16:36)  T(F): 98.3 (28 Feb 2025 07:38), Max: 98.5 (27 Feb 2025 16:36)  HR: 83 (28 Feb 2025 07:38) (80 - 85)  BP: 124/68 (28 Feb 2025 07:38) (100/52 - 124/68)  BP(mean): --  RR: 18 (28 Feb 2025 07:38) (18 - 18)  SpO2: 90% (28 Feb 2025 07:38) (90% - 98%)    Parameters below as of 28 Feb 2025 07:38  Patient On (Oxygen Delivery Method): room air        I&O's Summary    27 Feb 2025 07:01  -  28 Feb 2025 07:00  --------------------------------------------------------  IN: 142 mL / OUT: 350 mL / NET: -208 mL          Physical Exam:   GENERAL: NAD, well-groomed, well-developed  HEENT: VINNY/   Atraumatic, Normocephalic  ENMT: No tonsillar erythema, exudates, or enlargement; Moist mucous membranes, Good dentition, No lesions  NECK: Supple, No JVD, Normal thyroid  CHEST/LUNG: Decreased BS R base   CVS: Regular rate and rhythm; No murmurs, rubs, or gallops  GI: : Soft, Nontender, Nondistended; Bowel sounds present  NERVOUS SYSTEM:  Alert & Oriented X3, Good concentration; Motor Strength 5/5 B/L upper and lower extremities; DTRs 2+ intact and symmetric  EXTREMITIES:  LE cool, mottled toes   LYMPH: No lymphadenopathy noted  SKIN: No rashes or lesions  ENDOCRINOLOGY: No Thyromegaly  PSYCH: Appropriate    Labs:  ABG - ( 28 Feb 2025 08:05 )  pH, Arterial: 7.37  pH, Blood: x     /  pCO2: 46    /  pO2: 76    / HCO3: 27    / Base Excess: 0.8   /  SaO2: 98.2            30                            8.6    9.60  )-----------( 164      ( 27 Feb 2025 05:43 )             28.9                         8.7    8.81  )-----------( 168      ( 26 Feb 2025 21:22 )             28.3                         8.5    8.98  )-----------( 180      ( 26 Feb 2025 07:30 )             28.9                         8.5    6.92  )-----------( 196      ( 25 Feb 2025 10:12 )             29.1     02-26    135  |  97  |  26[H]  ----------------------------<  73  3.9   |  25  |  5.26[H]  02-25    136  |  97  |  20  ----------------------------<  83  3.7   |  28  |  4.28[H]      TPro  5.4[L]  /  Alb  2.4[L]  /  TBili  0.2  /  DBili  x   /  AST  11  /  ALT  6[L]  /  AlkPhos  104  02-26  TPro  5.6[L]  /  Alb  2.4[L]  /  TBili  0.2  /  DBili  x   /  AST  13  /  ALT  7[L]  /  AlkPhos  102  02-25                PTT - ( 28 Feb 2025 01:09 )  PTT:69.6 sec        < from: CT Chest No Cont (02.23.25 @ 16:46) >    ACC: [id number] EXAM:  CT CHEST   ORDERED BY:  NELL TOLBERT     PROCEDURE DATE:  02/23/2025          INTERPRETATION:  CLINICAL INFORMATION: 74-year-old male with cough    TECHNIQUE: A volumetric CT acquisition of the chest was obtained from the   thoracic inlet to the upper abdomen, without administration of   intravenous contrast. This CT scan was performed with one or more of the   following dose optimization: iterative reconstruction, automatic exposure   control, and/or manual adjustmentof mAs and kVp according to the   patient's size. 3D MIP and volume-rendered images were created at the   scanner.    COMPARISON: The study was compared to CT chest dated 2/23/2025    FINDINGS:  Lines and Tubes: There is a right-sided central venous catheter with the   tip terminating in distal right brachiocephalic vein.    Mediastinum: The thyroid and thoracic inlet are normal. There is no   enlarged axiliary, mediastinal, or hilar lymph nodes. The heart size is   within normal limits. There isno pericardial effusion. The aorta is   normal in course and caliber, with mild calcified atheromas. Lobar   pulmonary arteries have low attenuation, likely artifactual and due to   beam hardening. The esophagus is unremarkable.    Lung: The central tracheobronchial tree is patent. There is no focal   consolidation. There is mild peribronchial wall thickening most prominent   in the left lower lobe. There are mucoid impaction and airway associated   and subpleural groundglass opacities, more prominent in the left lower   lobe and to a lesser degree in right lower lobe..    Pleura: There is no pleural effusion or pneumothorax.    Abdomen: There is prominence of right renal pelvis. Cholelithiasis is   noted. There is no acute upper abdominal finding.    Bone and Soft Tissue: There is no evidence of osteosclerotic or   osteolytic lesions. There are multilevel degenerative changes of thoracic   spine , with disk space narrowing and osteophytosis. There is mild   subcutaneous edema in left lateral upper abdominal wall.    IMPRESSION:  Mucoid impaction and airway associated subpleural groundglass opacities,   most prominent in left lower lobe and to a lesser degree in right lower   lobe.  Mild bronchial wall thickening, likely inflammatory.    --- End of Report ---        < end of copied text >

## 2025-02-28 NOTE — DIETITIAN INITIAL EVALUATION ADULT - PHYSCIAL ASSESSMENT
Weight Hx Per:  - Source: patient   - UBW: 200 pounds   - Reported weight changes: none noted; spouse suspects weight loss.     [facility] to [facility]     Current Admission Weights:  - Dosing weight: 199.15 pounds/90.7 kg (02/25)    Weight Change:  - none noted     **  Will continue to monitor weight trends as available/able.     IBW: 154 pounds    %IBW: 129%

## 2025-02-28 NOTE — PROGRESS NOTE ADULT - SUBJECTIVE AND OBJECTIVE BOX
Subjective: Patient seen and examined. No new events except as noted.     REVIEW OF SYSTEMS:    CONSTITUTIONAL: + weakness, fevers or chills  EYES/ENT: No visual changes;  No vertigo or throat pain   NECK: No pain or stiffness  RESPIRATORY: No cough, wheezing, hemoptysis; No shortness of breath  CARDIOVASCULAR: No chest pain or palpitations  GASTROINTESTINAL: No abdominal or epigastric pain. No nausea, vomiting, or hematemesis; No diarrhea or constipation. No melena or hematochezia.  GENITOURINARY: No dysuria, frequency or hematuria  NEUROLOGICAL: No numbness or weakness  SKIN: No itching, burning, rashes, or lesions   All other review of systems is negative unless indicated above.    MEDICATIONS:  MEDICATIONS  (STANDING):  acetaminophen   IVPB .. 1000 milliGRAM(s) IV Intermittent once  acetylcysteine 20%  Inhalation 4 milliLiter(s) Inhalation every 6 hours  albuterol/ipratropium for Nebulization 3 milliLiter(s) Nebulizer every 6 hours  atorvastatin 40 milliGRAM(s) Oral at bedtime  calcium acetate 1334 milliGRAM(s) Oral three times a day with meals  chlorhexidine 4% Liquid 1 Application(s) Topical <User Schedule>  epoetin aleah-epbx (RETACRIT) Injectable 97490 Unit(s) IV Push <User Schedule>  guaiFENesin ER 1200 milliGRAM(s) Oral every 12 hours  heparin  Infusion.  Unit(s)/Hr (17 mL/Hr) IV Continuous <Continuous>  hydrOXYzine hydrochloride 25 milliGRAM(s) Oral three times a day  ibuprofen  Tablet. 400 milliGRAM(s) Oral every 12 hours  ketoconazole 2% Shampoo 1 Application(s) Topical <User Schedule>  oxyCODONE  ER Tablet 30 milliGRAM(s) Oral every 12 hours  pantoprazole    Tablet 40 milliGRAM(s) Oral before breakfast      PHYSICAL EXAM:  T(C): 36.8 (02-28-25 @ 07:38), Max: 36.9 (02-27-25 @ 16:36)  HR: 83 (02-28-25 @ 07:38) (80 - 85)  BP: 124/68 (02-28-25 @ 07:38) (100/52 - 124/68)  RR: 18 (02-28-25 @ 07:38) (18 - 18)  SpO2: 90% (02-28-25 @ 07:38) (90% - 98%)  Wt(kg): --  I&O's Summary    27 Feb 2025 07:01  -  28 Feb 2025 07:00  --------------------------------------------------------  IN: 142 mL / OUT: 350 mL / NET: -208 mL              Appearance: Normal	  HEENT:   Normal oral mucosa, PERRL, EOMI	  Lymphatic: No lymphadenopathy  Cardiovascular: Normal S1 S2, No JVD, No murmurs, No edema  Respiratory: Lungs clear to auscultation	  Psychiatry: A & O x 3, Mood & affect appropriate  Skin: No rashes, No ecchymoses, No cyanosis	  Neurologic: Non-focal  GI/Abd: Soft, obese, nontender. Dressing to Q C/D/I. Suprapubic catheter in place  Ext: Palp femoral pulses bilat. BLE atrophic, minimal dorsi/plantarflexion bilaterally. Sensation grossly intact. LLE edematous compared to R, erythema to right toes/forefoot. No active wounds bilat.   LLE:  small superficial abrasion, +edema and +ecchymosis over L knee  +TTP over L knee, no TTP along remainder of extremity; compartments soft  Limited ROM at knee 2/2 pain  No gross varus/valgus laxity, but assessment limited 2/2 pain  Motor: TA/EHL/GS/FHL intact  Sensory: DP/SP/Tib/Jordan/Saph SILT  +DP pulse (symmetric relative to contralateral side), WWP  Vascular: Peripheral pulses palpable 2+ bilaterally          LABS:    CARDIAC MARKERS:                                8.8    9.29  )-----------( 178      ( 28 Feb 2025 09:56 )             29.3           proBNP:   Lipid Profile:   HgA1c:   TSH:             TELEMETRY: 	    ECG:  	  RADIOLOGY:   DIAGNOSTIC TESTING:  [ ] Echocardiogram:  [ ]  Catheterization:  [ ] Stress Test:    OTHER:

## 2025-02-28 NOTE — PROGRESS NOTE ADULT - SUBJECTIVE AND OBJECTIVE BOX
Patient is a 74y old  Male who presents with a chief complaint of Right knee pain     (28 Feb 2025 14:32)      SUBJECTIVE / OVERNIGHT EVENTS: ptn is lethargic, awaiting RLE angiogram possible fem-fem bypass hopefully on 3/3 pending OR availability, awaiting HD today    MEDICATIONS  (STANDING):  acetaminophen   IVPB .. 1000 milliGRAM(s) IV Intermittent once  acetylcysteine 20%  Inhalation 4 milliLiter(s) Inhalation every 6 hours  albuterol/ipratropium for Nebulization 3 milliLiter(s) Nebulizer every 6 hours  atorvastatin 40 milliGRAM(s) Oral at bedtime  calcium acetate 1334 milliGRAM(s) Oral three times a day with meals  chlorhexidine 4% Liquid 1 Application(s) Topical <User Schedule>  epoetin aleah-epbx (RETACRIT) Injectable 45524 Unit(s) IV Push <User Schedule>  guaiFENesin ER 1200 milliGRAM(s) Oral every 12 hours  heparin  Infusion.  Unit(s)/Hr (17 mL/Hr) IV Continuous <Continuous>  hydrOXYzine hydrochloride 25 milliGRAM(s) Oral three times a day  ibuprofen  Tablet. 400 milliGRAM(s) Oral every 12 hours  ketoconazole 2% Shampoo 1 Application(s) Topical <User Schedule>  oxyCODONE  ER Tablet 30 milliGRAM(s) Oral every 12 hours  pantoprazole    Tablet 40 milliGRAM(s) Oral before breakfast    MEDICATIONS  (PRN):  acetaminophen     Tablet .. 650 milliGRAM(s) Oral every 6 hours PRN Temp greater or equal to 38C (100.4F), Mild Pain (1 - 3)  heparin   Injectable 7500 Unit(s) IV Push every 6 hours PRN For aPTT less than 40  heparin   Injectable 3500 Unit(s) IV Push every 6 hours PRN For aPTT between 40 - 57  HYDROmorphone  Injectable 2 milliGRAM(s) IV Push every 6 hours PRN Severe Pain (7 - 10)  sodium chloride 0.9% lock flush 10 milliLiter(s) IV Push every 1 hour PRN Pre/post blood products, medications, blood draw, and to maintain line patency      Vital Signs Last 24 Hrs  T(F): 98.8 (02-28-25 @ 16:09), Max: 98.8 (02-28-25 @ 16:09)  HR: 69 (02-28-25 @ 16:09) (69 - 88)  BP: 179/69 (02-28-25 @ 16:09) (100/55 - 179/69)  RR: 18 (02-28-25 @ 16:09) (18 - 18)  SpO2: 96% (02-28-25 @ 16:09) (90% - 97%)  Telemetry:   CAPILLARY BLOOD GLUCOSE        I&O's Summary    27 Feb 2025 07:01  -  28 Feb 2025 07:00  --------------------------------------------------------  IN: 142 mL / OUT: 350 mL / NET: -208 mL        PHYSICAL EXAM:  GENERAL: NAD, well-developed  HEAD:  Atraumatic, Normocephalic  EYES: EOMI, PERRLA, conjunctiva and sclera clear  NECK: Supple, No JVD  CHEST/LUNG: Clear to auscultation bilaterally; No wheeze  HEART: Regular rate and rhythm; No murmurs, rubs, or gallops  ABDOMEN: Soft, Nontender, Nondistended; Bowel sounds present  EXTREMITIES:  2+ Peripheral Pulses, No clubbing, cyanosis, or edema  PSYCH: AAOx3  NEUROLOGY: non-focal  SKIN: No rashes or lesions    LABS:                        8.8    9.29  )-----------( 178      ( 28 Feb 2025 09:56 )             29.3           PTT - ( 28 Feb 2025 09:56 )  PTT:122.4 sec          RADIOLOGY & ADDITIONAL TESTS:    Imaging Personally Reviewed:    Consultant(s) Notes Reviewed:      Care Discussed with Consultants/Other Providers:

## 2025-02-28 NOTE — PROGRESS NOTE ADULT - SUBJECTIVE AND OBJECTIVE BOX
[facility]  SABINO Griffin Y. Patel, S. Shah, G. Casimir  [phone]  ([phone] - weekdays after 5pm and weekends)    Name: BRIAN DEMPSEY  Age/Gender: 74y Male  MRN: [id number]    Interval History:  Patient seen and examined this morning.   Resting comfortably.   Notes reviewed  No concerning overnight events  Afebrile   Allergies: No Known Allergies      Objective:  Vitals:   T(F): 98.3 (02-28-25 @ 07:38), Max: 98.5 (02-27-25 @ 16:36)  HR: 83 (02-28-25 @ 07:38) (80 - 85)  BP: 124/68 (02-28-25 @ 07:38) (100/52 - 124/68)  RR: 18 (02-28-25 @ 07:38) (18 - 18)  SpO2: 90% (02-28-25 @ 07:38) (90% - 98%)  Physical Examination:  General: no acute distress, nontoxic appearing   HEENT: normocephalic, atraumatic, anicteric  Respiratory: no acc muscle use, breathing comfortably  Cardiovascular: S1 and S2 present  Gastrointestinal: normal appearing, nondistended  Extremities: no edema    Laboratory Studies:  CBC:                       8.8    9.29  )-----------( 178      ( 28 Feb 2025 09:56 )             29.3     WBC Trend:  9.29 02-28-25 @ 09:56  9.60 02-27-25 @ 05:43  8.81 02-26-25 @ 21:22  8.98 02-26-25 @ 07:30  6.92 02-25-25 @ 10:12  7.68 02-23-25 @ 07:55  8.51 02-22-25 @ 07:20    CMP:   Creatinine: 5.26 mg/dL (02-26-25 @ 07:30)  Creatinine: 4.28 mg/dL (02-25-25 @ 10:12)  Creatinine: 4.16 mg/dL (02-23-25 @ 07:55)  Creatinine: 4.99 mg/dL (02-22-25 @ 07:03)    Microbiology: reviewed   Culture - Blood (collected 02-20-25 @ 01:55)  Source: .Blood Blood-Peripheral  Final Report (02-25-25 @ 04:00):    No growth at 5 days    Radiology: reviewed     Medications:  acetaminophen     Tablet .. 650 milliGRAM(s) Oral every 6 hours PRN  acetaminophen   IVPB .. 1000 milliGRAM(s) IV Intermittent once  acetylcysteine 20%  Inhalation 4 milliLiter(s) Inhalation every 6 hours  albuterol/ipratropium for Nebulization 3 milliLiter(s) Nebulizer every 6 hours  atorvastatin 40 milliGRAM(s) Oral at bedtime  calcium acetate 1334 milliGRAM(s) Oral three times a day with meals  chlorhexidine 4% Liquid 1 Application(s) Topical <User Schedule>  epoetin aleah-epbx (RETACRIT) Injectable 93565 Unit(s) IV Push <User Schedule>  guaiFENesin ER 1200 milliGRAM(s) Oral every 12 hours  heparin   Injectable 7500 Unit(s) IV Push every 6 hours PRN  heparin   Injectable 3500 Unit(s) IV Push every 6 hours PRN  heparin  Infusion.  Unit(s)/Hr IV Continuous <Continuous>  HYDROmorphone  Injectable 2 milliGRAM(s) IV Push every 6 hours PRN  hydrOXYzine hydrochloride 25 milliGRAM(s) Oral three times a day  ibuprofen  Tablet. 400 milliGRAM(s) Oral every 12 hours  ketoconazole 2% Shampoo 1 Application(s) Topical <User Schedule>  oxyCODONE  ER Tablet 30 milliGRAM(s) Oral every 12 hours  pantoprazole    Tablet 40 milliGRAM(s) Oral before breakfast  sodium chloride 0.9% lock flush 10 milliLiter(s) IV Push every 1 hour PRN    Prior/Completed Antimicrobials:  piperacillin/tazobactam IVPB.  piperacillin/tazobactam IVPB.-  vancomycin  IVPB

## 2025-02-28 NOTE — PROGRESS NOTE ADULT - ASSESSMENT
74 year old male with CKD, sp polio, paraplegia with associated neurogenic bladder s/p suprapubic catheter who was admitted s/p fall on 2/16.   CKD - ESRD, now s/p DEYA [place] 2/17, on HD  ruled out cellulitis around suprapubic cath  2/20 s/p RRT for tremors and confusion -- pt with chronic tremors although notably worse  hyponatremia    labs sars cov2/rsv/flu neg  mrsa negative   2/20 CXR with clear lungs     temps wnl, pt asx, no leukocytosis, off antibiotics   SPC site with chronic/stable inflammatory changes, no drainage - no sign of SSTI  CTA abd with occlusion of b/l external iliac arteries and b/l superficial femoral arteries with distal reconstitution at popliteal arteries; patent three vessel runoff of RLE with 2 vessel runoff of LLE with occlusion of L mid anterior tibial artery with distal reconstitution  CTH with no acute findings   Bcx negative to date   mental status at baseline, nontoxic appearing     s/p vancomycin x1 2/20  s/p zosyn 2/20-2/22      Recommendations:   Continue off antibiotics   Monitor temps/WBC  Vascular surgery following   HD per renal    Continue rest of care per primary team     Over the weekend Dr. Maddi Eden will be covering for our group. If you have any questions, concerns or new micro data please reach out to them using TEAMS *PREFERRED* or by calling our service at [phone].     Bridgette Ramirez M.D.  [facility]  Available on [employer] TEAMS - *PREFERRED*  [phone]  After 5pm on weekdays and all day on weekends - please call [phone]     Thank you for consulting us and involving us in the management of this patients case. In addition to reviewing history, imaging, documents, labs, microbiology, took into account antibiotic stewardship, local antibiogram and infection control strategies and potential transmission issues at time of treatment decision making process.

## 2025-02-28 NOTE — PROGRESS NOTE ADULT - ASSESSMENT
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 The initial plan was that he would present to the [facility] ER Monday 2/17 for HD initiation. However, day of admission, he fell and sustained trauma to his knee. Called to consult for cough, elevated R hemidiaphragm.

## 2025-02-28 NOTE — DIETITIAN INITIAL EVALUATION ADULT - NSFNSGIIOFT_GEN_A_CORE
- Pt denies nausea, vomiting, diarrhea, or constipation.   - Last BM: 2/25; with colostomy bag per chart review.

## 2025-02-28 NOTE — DIETITIAN INITIAL EVALUATION ADULT - ADD RECOMMEND
1. Continue current diet order: renal diet   2. Recommend Nepro 2x/day = 850 jessica, 38 Gm protein and protein-fortified smoothie of day 1x/day (Monday-Friday, 300 jessica 18 gm protein).   3. Monitor and encourage PO intake. Encourage use of daily menus. Honor dietary preferences as expressed as able.   4. New malnutrition notification sent.  1. Continue current diet order: renal diet   2. Recommend Nepro 2x/day = 850 jessica, 38 Gm protein and protein-fortified smoothie of day 1x/day (Monday-Friday, 300 jessica 18 gm protein).   3. Monitor and encourage PO intake. Encourage use of daily menus. Honor dietary preferences as expressed as able.   4. New malnutrition notification sent.   5. Recommend Nephrovite to help aid in wound healing pending no contraindications.

## 2025-02-28 NOTE — DIETITIAN INITIAL EVALUATION ADULT - PERTINENT MEDS FT
MEDICATIONS  (STANDING):  acetaminophen   IVPB .. 1000 milliGRAM(s) IV Intermittent once  acetylcysteine 20%  Inhalation 4 milliLiter(s) Inhalation every 6 hours  albuterol/ipratropium for Nebulization 3 milliLiter(s) Nebulizer every 6 hours  atorvastatin 40 milliGRAM(s) Oral at bedtime  calcium acetate 1334 milliGRAM(s) Oral three times a day with meals  chlorhexidine 4% Liquid 1 Application(s) Topical <User Schedule>  epoetin aleah-epbx (RETACRIT) Injectable 92006 Unit(s) IV Push <User Schedule>  guaiFENesin ER 1200 milliGRAM(s) Oral every 12 hours  heparin  Infusion.  Unit(s)/Hr (17 mL/Hr) IV Continuous <Continuous>  hydrOXYzine hydrochloride 25 milliGRAM(s) Oral three times a day  ibuprofen  Tablet. 400 milliGRAM(s) Oral every 12 hours  ketoconazole 2% Shampoo 1 Application(s) Topical <User Schedule>  oxyCODONE  ER Tablet 30 milliGRAM(s) Oral every 12 hours  pantoprazole    Tablet 40 milliGRAM(s) Oral before breakfast    MEDICATIONS  (PRN):  acetaminophen     Tablet .. 650 milliGRAM(s) Oral every 6 hours PRN Temp greater or equal to 38C (100.4F), Mild Pain (1 - 3)  heparin   Injectable 7500 Unit(s) IV Push every 6 hours PRN For aPTT less than 40  heparin   Injectable 3500 Unit(s) IV Push every 6 hours PRN For aPTT between 40 - 57  HYDROmorphone  Injectable 2 milliGRAM(s) IV Push every 6 hours PRN Severe Pain (7 - 10)  sodium chloride 0.9% lock flush 10 milliLiter(s) IV Push every 1 hour PRN Pre/post blood products, medications, blood draw, and to maintain line patency

## 2025-02-28 NOTE — PROGRESS NOTE ADULT - ASSESSMENT
74M with history of HTN, polio with multiple subsequent problems including LE weakness requiring wheelchair, neurogenic bladder s/p suprapubic catheter, and colostomy s/p iatrogenic rectal injury 2/2023, and CKD 5 presenting to the ED with weakness and falls. Admitted for initiation of HD now s/p R IJ shiley placement on 2/17/25 and conversion to tunneled catheter on 2/25. Vascular surgery initially consulted for AVF creation; however, patient's worsening PAD with worsening ischemic changes is the more urgent issue.     Plan:  - planning for RLE Angiogram, possible bilateral, possible angioplasty, possible stent placement, possible femoral-femoral bypass on Monday pending OR availability  - Cardiac clearance documented 2/28; medicine clearance documented 2/27     - AVF creation is on hold  - pending consent  - c/w hep gtt  - continue HD via tunneled catheter     - will need HD on Saturday in addition to Friday in preparation for OR Monday  - continue to protect nondominant left arm  - vascular surgery to follow    Vascular Surgery   [id number]

## 2025-02-28 NOTE — DIETITIAN INITIAL EVALUATION ADULT - ENERGY INTAKE
Poor (<50%) -Pt's spouse reports poor PO intake; is amenable to receiving Nepro shakes BID and protein-fortified smoothie.

## 2025-02-28 NOTE — DIETITIAN INITIAL EVALUATION ADULT - ORAL INTAKE PTA/DIET HISTORY
Pt's spouse reports pt with poor appetite and PO intake PTA; consuming <75% of most meals. Follows a renal diet. Pt denies any known food allergies or intolerances. Pt denies any micronutrient supplementation at home. Denies any difficulty chewing/swallowing at this time.

## 2025-02-28 NOTE — DIETITIAN INITIAL EVALUATION ADULT - NS FNS DIET ORDER
Diet, Renal Restrictions:   For patients receiving Renal Replacement - No Protein Restr, No Conc K, No Conc Phos, Low Sodium  1000mL Fluid Restriction ([id number]) (02-20-25 @ 08:25) [Active]

## 2025-02-28 NOTE — PROGRESS NOTE ADULT - SUBJECTIVE AND OBJECTIVE BOX
Overnight events noted      VITAL:  T(C): , Max: 36.9 (02-27-25 @ 16:36)  T(F): , Max: 98.5 (02-27-25 @ 16:36)  HR: 83 (02-28-25 @ 07:38)  BP: 124/68 (02-28-25 @ 07:38)  BP(mean): --  RR: 18 (02-28-25 @ 07:38)  SpO2: 90% (02-28-25 @ 07:38)  Wt(kg): --      PHYSICAL EXAM:  Constitutional: anxious  HEENT: NCAT, DMM  Neck: Supple, No JVD  Respiratory: CTA-b/l  Cardiovascular: RRR s1s2, no m/r/g  Gastrointestinal: BS+, soft, NT/ND  Extremities: No peripheral edema b/l  Neurological: reduced generalized strength  Back: no CVAT b/l  Skin: No rashes, no nevi  Access: RIJ tunneled cath    LABS:                        8.8    9.29  )-----------( 178      ( 28 Feb 2025 09:56 )             29.3     Na(135)/K(3.9)/Cl(97)/HCO3(25)/BUN(26)/Cr(5.26)Glu(73)/Ca(7.4)/Mg(2.1)/PO4(3.0)    02-26 @ 07:30      IMPRESSION: 74M w/ polio, neurogenic bladder/suprapubic catheter, 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 I just spoke to the Vascular team; they ask he can get HD on Saturday to optimize for OR on Monday. We can plan to forgo HD today and instead dialyze tomorrow.    (2)Anemia - s/p IV iron; on Retacrit with HD    (3)Ortho - nondisplaced acute on chronic patellar fracture - Ortho input appreciated - recommending conservative therapy as inpatient    (4)PAD - input appreciated - on heparin gtt - potentially for intervention Monday 3/3    (5)Dispo - for outpt HD at [facility] upon discharge. Eventually, we would look to try to transition him to home HD.      RECOMMEND:  (1)Will plan for HD tomorrow rather than today - 0.7kg UF as able; Retacrit with HD            Rafita Denny MD  [facility]  Office/on call physician: [phone]  Cell (7a-7p): [phone]       (+)intermittent LE pain; controlled at present with analgesics  Wife at bedside      VITAL:  T(C): , Max: 36.9 (02-27-25 @ 16:36)  T(F): , Max: 98.5 (02-27-25 @ 16:36)  HR: 83 (02-28-25 @ 07:38)  BP: 124/68 (02-28-25 @ 07:38)  BP(mean): --  RR: 18 (02-28-25 @ 07:38)  SpO2: 90% (02-28-25 @ 07:38)  Wt(kg): --      PHYSICAL EXAM:  Constitutional: lethargic, NAD  HEENT: NCAT, DMM  Neck: Supple, No JVD  Respiratory: CTA-b/l  Cardiovascular: RRR s1s2, no m/r/g  Gastrointestinal: BS+, soft, NT/ND  Extremities: No peripheral edema b/l  Neurological: reduced generalized strength  Back: no CVAT b/l  Skin: (+)purplish changes 1st/2nd toes of R foot  Access: RIJ tunneled cath    LABS:                        8.8    9.29  )-----------( 178      ( 28 Feb 2025 09:56 )             29.3     Na(135)/K(3.9)/Cl(97)/HCO3(25)/BUN(26)/Cr(5.26)Glu(73)/Ca(7.4)/Mg(2.1)/PO4(3.0)    02-26 @ 07:30      IMPRESSION: 74M w/ polio, neurogenic bladder/suprapubic catheter, 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 I just spoke to the Vascular team; they ask he can get HD on Saturday to optimize for OR on Monday. We can plan to forgo HD today and instead dialyze tomorrow.    (2)Anemia - s/p IV iron; on Retacrit with HD    (3)Ortho - nondisplaced acute on chronic patellar fracture - Ortho input appreciated - recommending conservative therapy as inpatient    (4)PAD - input appreciated - on heparin gtt - potentially for intervention Monday 3/3    (5)Dispo - for outpt HD at [facility] in [place] upon discharge. Eventually, we would look to try to transition him to home HD.      RECOMMEND:  (1)Will plan for HD tomorrow rather than today - 0.7kg UF as able; Retacrit with HD            Rafita Denny MD  [facility]  Office/on call physician: [phone]  Cell (7a-7p): [phone]       (+)intermittent LE pain; controlled at present with analgesics  Wife at bedside      VITAL:  T(C): , Max: 36.9 (02-27-25 @ 16:36)  T(F): , Max: 98.5 (02-27-25 @ 16:36)  HR: 83 (02-28-25 @ 07:38)  BP: 124/68 (02-28-25 @ 07:38)  BP(mean): --  RR: 18 (02-28-25 @ 07:38)  SpO2: 90% (02-28-25 @ 07:38)  Wt(kg): --      PHYSICAL EXAM:  Constitutional: lethargic, NAD  HEENT: NCAT, DMM  Neck: Supple, No JVD  Respiratory: CTA-b/l  Cardiovascular: RRR s1s2, no m/r/g  Gastrointestinal: BS+, soft, NT/ND  Extremities: No peripheral edema b/l  Neurological: reduced generalized strength  Back: no CVAT b/l  Skin: (+)violaceous changes 1st/2nd toes of R foot  Access: RIJ tunneled cath    LABS:                        8.8    9.29  )-----------( 178      ( 28 Feb 2025 09:56 )             29.3     Na(135)/K(3.9)/Cl(97)/HCO3(25)/BUN(26)/Cr(5.26)Glu(73)/Ca(7.4)/Mg(2.1)/PO4(3.0)    02-26 @ 07:30      IMPRESSION: 74M w/ polio, neurogenic bladder/suprapubic catheter, 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 I just spoke to the Vascular team; they ask he can get HD on Saturday to optimize for OR on Monday. We can plan to forgo HD today and instead dialyze tomorrow.    (2)Anemia - s/p IV iron; on Retacrit with HD    (3)Ortho - nondisplaced acute on chronic patellar fracture - Ortho input appreciated - recommending conservative therapy as inpatient    (4)PAD - input appreciated - on heparin gtt - potentially for intervention Monday 3/3    (5)Dispo - for outpt HD at [facility] in [place] upon discharge. Eventually, we would look to try to transition him to home HD.      RECOMMEND:  (1)Will plan for HD tomorrow rather than today - 0.7kg UF as able; Retacrit with HD            Rafita Denny MD  [facility]  Office/on call physician: [phone]  Cell (7a-7p): [phone]

## 2025-02-28 NOTE — PROGRESS NOTE ADULT - ASSESSMENT
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 He is well known to me from multiple recent admisions  s/p admissions at [facility] 12/20-12/25/24 and 2/4-2/7 with SONDRA on CKD with associated uremic symptoms.   At the last admission he had expressed strong interest to try to hold off with HD intiation but he has been getting worse clinically at home.  His SONDRA and uremic symptoms significantly improved after the first admission; they improved a to mild extent during the 2nd admission to the point where he was able to be discharged without HD. Since then, however, he has worsened further.   He has been suffering from progressively worsening diffuse pruritus, loss of appetite, and generalized weakness and falls.   His nephrologist and PCP has been speaking with him and his family over the past few days,   The initial plan was that he would present to the [facility] ER Monday 2/17 (ie tomorrow) for HD initiation. Today, however, he fell and sustained trauma to his knee. Given the fall and concern for knee fracture, he presented to the ER today. multiple xrays show no Fractures.      CKD5, uremia, requiring initiation of HD  Rash, seborrheic dermatitis  Pruritis  Anemia  PAD  SP catheter, chronic  Left inferior pole acute on chronic patella Fx    plan  - HD via RIJ permacath  -2/28: ptn is lethargic, awaiting RLE angiogram possible fem-fem bypass hopefully on 3/3 pending OR availability, awaiting HD today    -2/27:  plan for angiogram in am with possible angioplasty, possible stent, possible fem-fem bypass. medically cleared for angiogram and possible surgery, stent, angioplasty. pain is controlled. remains on heparin drip as per vascular. HD as per renal    -2/26:  ptn is sleeping, pain is controlled, he was seen by wound care and vascular attending. planning on angiogram 2/2 severe PAD in LE on CTA. he also spoke to HCP. ptn and HCP in agreement. ptn was started on HEPARIN drip as per vascular recs. RIJ permacath done 2/25    - 2/25: ptn states he is hallucinating, but not at present, his MS is at baseline, his pain is controlled, he still needs prn pain meds when he is moved in the bed or for testing or for HD. awaiting Permacath, AVF creation, LE bypass to be d/w Dr. Swenson and the ptn tomorrow. ptn has cardiology clearance  if he opts for. Ptn has sacral decubiti and posterior thigh and buttock decibiti and b/l heel decubiti. Z flow bootie d/w RN, get wound care consult    -2/24: pain is controlled  if the LLE is immobile and fully extended. doesn't tolerate the knee immobilizer. has a medium condyle and acute on chronic patella fx in LEFT knee. as per vascular ptn will need iliac stent  w a fem-fem bypass, possible axillo-femoral bypass. for this surgery he would need cardiac work up . more pressing surgery is LUE AVF creation,  in IR will arrange for Permacath. for pain control: Motrin 400 mg q12H, Oxy ER 30 mg q12H, Oxy IR 10 for mod pain and dilaudid 2 mg iv for severe pain. still has pruritis though improved, will raise atarax 25 mg to tid. plan of care d/w daughter Norma Persaud , ptn and his wife. Also d/w renal, card, vascular, ACP    -2/23: Daughter Cori is the HCP, she and the ptn filled out the paperwork. daily plan and findings d/w her and the ptn. MS is at abseline, c/o b/l LE foot pain and Left Knee pain. xrays of left knee: cannot r/o acute on chronic inferior pole patellar Fx. knee immobilizer is on, awaiting ortho consult. doubt needs any intervention awaiting Ct chest today, will add CT Left knee. ptn states itching has recurred, severe pain has recurred. will resume Atatrax ( 25 mg bid) and Oxycodone ER , but at a lower dose 15 mg q12H. cont prn analgesics. HD as per renal, awaiting Vascular consult f/u re CTA A/L &LE and recs. ptn also wants AVF surgery on this admission. Coughing has stopped    - 2/22: ptn is drowsy but arousable, calmer today, answers questions appropriately. he is pain free, he stopped coughing, he denies having pruritis: will DC:  OXY ER, Atarax, Tessalon perles.     -  2/21: ptn is tearful, a bit confused, coughing, recognizes me and family at bedside. GOC d/w daughter and wife. AMS prob delirium 2/2 acute illness +/- opiods, lyrica, atarac. will lower atarak to tid, dc lyrica, cont Oxy 2/2 ptn has severe LE pain. neuro called for eval. Head ct done yesterday w no acute findings. CTA A/P w LE run fof: severe PAD, vascular to follow up on plan fo care. plan for HD tomorrow and next week place on tiw schedule of MWF. will need tunneled catheter prior to DC and will d/w vascular scheduling of AVF. ptn wants all to be done while inptn. daughter wants to be HCP as per ptn's wishes. she was given paperwork to get it signed and witnessed and will present to nursing thereafter. details of findings and complaints and plan of care d/w daughter and wife. spent 60 min. RVP ordered. start mucinex , tessalon perles, duonebs, get CT chest to r/o PNA. pulm called    -  2/20:  ptn had RRT 2/2 AMS and tremors. no SZ, no LOC. noted to have Na 123( hyponatremia), given 500 cc NS. Gabapentin DCed. ptn had been taking gabapentin at home PTA. Pain is controlled. Pruritis resolved, tolerating HD otherwise.     - Pain management:  cont Oxycodone 10 IR q6H,  DIlaudid prn severe pain 2 mg q4H prn.   - cont outptn meds  - DVT ppx w HSC

## 2025-02-28 NOTE — DIETITIAN INITIAL EVALUATION ADULT - REASON INDICATOR FOR ASSESSMENT
Nutrition consult warranted for: pt with deep tissue injury    Information obtained from: electronic medical record, patient and spouse at bedside.   Chart reviewed, events noted.

## 2025-02-28 NOTE — PROGRESS NOTE ADULT - SUBJECTIVE AND OBJECTIVE BOX
SUBJECTIVE: NAEO.     Vital Signs Last 24 Hrs  T(C): 36.8 (28 Feb 2025 07:38), Max: 36.9 (27 Feb 2025 16:36)  T(F): 98.3 (28 Feb 2025 07:38), Max: 98.5 (27 Feb 2025 16:36)  HR: 83 (28 Feb 2025 07:38) (80 - 85)  BP: 124/68 (28 Feb 2025 07:38) (100/52 - 124/68)  BP(mean): --  RR: 18 (28 Feb 2025 07:38) (18 - 18)  SpO2: 90% (28 Feb 2025 07:38) (90% - 98%)    Parameters below as of 28 Feb 2025 07:38  Patient On (Oxygen Delivery Method): room air        I&O's Detail    27 Feb 2025 07:01  -  28 Feb 2025 07:00  --------------------------------------------------------  IN:    Heparin Infusion: 142 mL  Total IN: 142 mL    OUT:    Indwelling Catheter - Suprapubic (mL): 350 mL  Total OUT: 350 mL    Total NET: -208 mL        Physical Exam:  General: NAD  Neuro: Awake, alert and oriented x3  Resp: Nonlabored breathing on RA  CV: Hemodynamically stable.   GI/Abd: Soft, obese, nontender. Dressing to [facility] C/D/I. Suprapubic catheter in place  Vascular: Nonpalpable femoral pulses. Nonpalpable DP/PT b/l.   Extremities: BLE atrophic, minimal dorsi/plantarflexion bilaterally. Sensation diminished, LLE edematous compared to R, mottling of right toes/forefoot/heel, No active wounds bilat.       LABS:                        8.8    9.29  )-----------( 178      ( 28 Feb 2025 09:56 )             29.3           PTT - ( 28 Feb 2025 01:09 )  PTT:69.6 sec      RADIOLOGY & ADDITIONAL STUDIES:

## 2025-03-01 NOTE — PROGRESS NOTE ADULT - ASSESSMENT
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 The initial plan was that he would present to the [facility] ER Monday 2/17 for HD initiation. However, day of admission, he fell and sustained trauma to his knee. Called to consult for cough, elevated R hemidiaphragm.

## 2025-03-01 NOTE — PROGRESS NOTE ADULT - ASSESSMENT
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 He is well known to me from multiple recent admisions  s/p admissions at [facility] 12/20-12/25/24 and 2/4-2/7 with SONDRA on CKD with associated uremic symptoms.   At the last admission he had expressed strong interest to try to hold off with HD intiation but he has been getting worse clinically at home.  His SONDRA and uremic symptoms significantly improved after the first admission; they improved a to mild extent during the 2nd admission to the point where he was able to be discharged without HD. Since then, however, he has worsened further.   He has been suffering from progressively worsening diffuse pruritus, loss of appetite, and generalized weakness and falls.   His nephrologist and PCP has been speaking with him and his family over the past few days,   The initial plan was that he would present to the [facility] ER Monday 2/17 (ie tomorrow) for HD initiation. Today, however, he fell and sustained trauma to his knee. Given the fall and concern for knee fracture, he presented to the ER today. multiple xrays show no Fractures.      CKD5, uremia, requiring initiation of HD  Rash, seborrheic dermatitis  Pruritis  Anemia  PAD  SP catheter, chronic  Left inferior pole acute on chronic patella Fx    plan  - HD via RIJ permacath    -3/1: ptn is smiling, pain free, had HD yest and awaiting another session today as per renal, awaiting RLE angiogram 3/3, on Heparin drip  -2/28: ptn is lethargic, awaiting RLE angiogram possible fem-fem bypass hopefully on 3/3 pending OR availability, awaiting HD today    -2/27:  plan for angiogram in am with possible angioplasty, possible stent, possible fem-fem bypass. medically cleared for angiogram and possible surgery, stent, angioplasty. pain is controlled. remains on heparin drip as per vascular. HD as per renal    -2/26:  ptn is sleeping, pain is controlled, he was seen by wound care and vascular attending. planning on angiogram 2/2 severe PAD in LE on CTA. he also spoke to HCP. ptn and HCP in agreement. ptn was started on HEPARIN drip as per vascular recs. RIJ permacath done 2/25    - 2/25: ptn states he is hallucinating, but not at present, his MS is at baseline, his pain is controlled, he still needs prn pain meds when he is moved in the bed or for testing or for HD. awaiting Permacath, AVF creation, LE bypass to be d/w Dr. Swenson and the ptn tomorrow. ptn has cardiology clearance  if he opts for. Ptn has sacral decubiti and posterior thigh and buttock decibiti and b/l heel decubiti. Z flow fabienne d/w RN, get wound care consult    -2/24: pain is controlled  if the LLE is immobile and fully extended. doesn't tolerate the knee immobilizer. has a medium condyle and acute on chronic patella fx in LEFT knee. as per vascular ptn will need iliac stent  w a fem-fem bypass, possible axillo-femoral bypass. for this surgery he would need cardiac work up . more pressing surgery is LUE AVF creation,  in IR will arrange for Permacath. for pain control: Motrin 400 mg q12H, Oxy ER 30 mg q12H, Oxy IR 10 for mod pain and dilaudid 2 mg iv for severe pain. still has pruritis though improved, will raise atarax 25 mg to tid. plan of care d/w daughter Norma Persaud , ptn and his wife. Also d/w renal, card, vascular, ACP    -2/23: Daughter Cori is the HCP, she and the ptn filled out the paperwork. daily plan and findings d/w her and the ptn. MS is at abseline, c/o b/l LE foot pain and Left Knee pain. xrays of left knee: cannot r/o acute on chronic inferior pole patellar Fx. knee immobilizer is on, awaiting ortho consult. doubt needs any intervention awaiting Ct chest today, will add CT Left knee. ptn states itching has recurred, severe pain has recurred. will resume Atatrax ( 25 mg bid) and Oxycodone ER , but at a lower dose 15 mg q12H. cont prn analgesics. HD as per renal, awaiting Vascular consult f/u re CTA A/L &LE and recs. ptn also wants AVF surgery on this admission. Coughing has stopped    - 2/22: ptn is drowsy but arousable, calmer today, answers questions appropriately. he is pain free, he stopped coughing, he denies having pruritis: will DC:  OXY ER, Atarax, Tessalon perles.     -  2/21: ptn is tearful, a bit confused, coughing, recognizes me and family at bedside. GOC d/w daughter and wife. AMS prob delirium 2/2 acute illness +/- opiods, lyrica, atarac. will lower atarak to tid, dc lyrica, cont Oxy 2/2 ptn has severe LE pain. neuro called for eval. Head ct done yesterday w no acute findings. CTA A/P w LE run fof: severe PAD, vascular to follow up on plan fo care. plan for HD tomorrow and next week place on tiw schedule of MWF. will need tunneled catheter prior to DC and will d/w vascular scheduling of AVF. ptn wants all to be done while inptn. daughter wants to be HCP as per ptn's wishes. she was given paperwork to get it signed and witnessed and will present to nursing thereafter. details of findings and complaints and plan of care d/w daughter and wife. spent 60 min. RVP ordered. start mucinex , tessalon perles, duoneclemente, get CT chest to r/o PNA. pulm called    -  2/20:  ptn had RRT 2/2 AMS and tremors. no SZ, no LOC. noted to have Na 123( hyponatremia), given 500 cc NS. Gabapentin DCed. ptn had been taking gabapentin at home PTA. Pain is controlled. Pruritis resolved, tolerating HD otherwise.     - Pain management:  cont Oxycodone 10 IR q6H,  DIlaudid prn severe pain 2 mg q4H prn.   - cont outptn meds  - DVT ppx w HSC

## 2025-03-01 NOTE — PROGRESS NOTE ADULT - PROBLEM SELECTOR PLAN 1
-CXR with elevated R hemidiaphragm (not present on CXR in December 2024)  -Low grade temp, cough  -CT chest with LLL, RLL GGO, mucoid impacted airways. No clear PNA. Monitoring off ABX per ID  -Suggest Duoneb q6h, Mucinex 1200 mg PO BID   -Mucomyst x5 days   -Incentive spirometry   -Normoxic, keep sats >90% with o2 PRN  -ABG this AM with no co2 retention.  on 3/1 remains ok: will do chest xray today : he is on bed all the time and does not move : high risk for pneumonia:

## 2025-03-01 NOTE — PROGRESS NOTE ADULT - SUBJECTIVE AND OBJECTIVE BOX
Overnight events noted      VITAL:  T(C): , Max: 37.6 (03-01-25 @ 00:44)  T(F): , Max: 99.6 (03-01-25 @ 00:44)  HR: 96 (03-01-25 @ 08:24)  BP: 123/45 (03-01-25 @ 08:24)  BP(mean): --  RR: 18 (03-01-25 @ 08:24)  SpO2: 99% (03-01-25 @ 08:24)  Wt(kg): --      PHYSICAL EXAM:  Constitutional: lethargic, NAD  HEENT: NCAT, DMM  Neck: Supple, No JVD  Respiratory: CTA-b/l  Cardiovascular: RRR s1s2, no m/r/g  Gastrointestinal: BS+, soft, NT/ND  Extremities: No peripheral edema b/l  Neurological: reduced generalized strength  Back: no CVAT b/l  Skin: (+)violaceous changes 1st/2nd toes of R foot  Access: RIJ tunneled cath      LABS:                        10.0   10.79 )-----------( 239      ( 01 Mar 2025 07:02 )             33.2     Na(131)/K(4.7)/Cl(94)/HCO3(22)/BUN(33)/Cr(5.69)Glu(88)/Ca(8.0)/Mg(--)/PO4(--)    03-01 @ 07:01      IMPRESSION: 74M w/ polio, neurogenic bladder/suprapubic catheter, 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 I just spoke to the Vascular team; they ask he can get HD on Saturday to optimize for OR on Monday. We can plan to forgo HD today and instead dialyze tomorrow.    (2)Anemia - s/p IV iron; on Retacrit with HD    (3)Ortho - nondisplaced acute on chronic patellar fracture - Ortho input appreciated - recommending conservative therapy as inpatient    (4)PAD - input appreciated - on heparin gtt - potentially for LE bypass Monday 3/3    (5)Dispo - for outpt HD at [facility] upon discharge. Eventually, we would look to try to transition him to home HD.      RECOMMEND:  (1)HD today - 0.7kg UF as able; Retacrit with HD  (2)No objection to LE bypass sugery Mon 3/3/25 as planned  (3)HD post-op Monday 3/3      Rafita Denny MD  [facility]  Office/on call physician: [phone]  Cell (7a-7p): [phone]       Overnight events noted      VITAL:  T(C): , Max: 37.6 (03-01-25 @ 00:44)  T(F): , Max: 99.6 (03-01-25 @ 00:44)  HR: 96 (03-01-25 @ 08:24)  BP: 123/45 (03-01-25 @ 08:24)  BP(mean): --  RR: 18 (03-01-25 @ 08:24)  SpO2: 99% (03-01-25 @ 08:24)  Wt(kg): --      PHYSICAL EXAM:  Constitutional: lethargic, NAD  HEENT: NCAT, DMM  Neck: Supple, No JVD  Respiratory: CTA-b/l  Cardiovascular: RRR s1s2, no m/r/g  Gastrointestinal: BS+, soft, NT/ND  Extremities: No peripheral edema b/l  Neurological: reduced generalized strength  Back: no CVAT b/l  Skin: (+)violaceous changes 1st/2nd toes of R foot  Access: RIJ tunneled cath      LABS:                        10.0   10.79 )-----------( 239      ( 01 Mar 2025 07:02 )             33.2     Na(131)/K(4.7)/Cl(94)/HCO3(22)/BUN(33)/Cr(5.69)Glu(88)/Ca(8.0)/Mg(--)/PO4(--)    03-01 @ 07:01      IMPRESSION: 74M w/ polio, neurogenic bladder/suprapubic catheter, 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 I just spoke to the Vascular team; they ask he can get HD on Saturday to optimize for OR on Monday. We can plan to forgo HD today and instead dialyze tomorrow.    (2)Anemia - s/p IV iron; on Retacrit with HD    (3)Ortho - nondisplaced acute on chronic patellar fracture - Ortho input appreciated - recommending conservative therapy as inpatient    (4)PAD - input appreciated - on heparin gtt - planned for LE angio, potential stent, and potential bypass Monday 3/3    (5)Dispo - for outpt HD at [facility] in [place] upon discharge. Eventually, we would look to try to transition him to home HD.      RECOMMEND:  (1)HD today - 0.7kg UF as able; Retacrit with HD  (2)No objection to vascular surgery Mon 3/3/25 as planned  (3)HD post-op Monday 3/3      Rafita Denny MD  [facility]  Office/on call physician: [phone]  Cell (7a-7): [phone]       (+)intermittent b/l LE pain  no sob      VITAL:  T(C): , Max: 37.6 (03-01-25 @ 00:44)  T(F): , Max: 99.6 (03-01-25 @ 00:44)  HR: 96 (03-01-25 @ 08:24)  BP: 123/45 (03-01-25 @ 08:24)  BP(mean): --  RR: 18 (03-01-25 @ 08:24)  SpO2: 99% (03-01-25 @ 08:24)  Wt(kg): --      PHYSICAL EXAM:  Constitutional: alert, NAD  HEENT: NCAT, DMM  Neck: Supple, No JVD  Respiratory: CTA-b/l  Cardiovascular: RRR s1s2, no m/r/g  Gastrointestinal: BS+, soft, NT/ND  Extremities: No peripheral edema b/l  Neurological: reduced generalized strength  Back: no CVAT b/l  Skin: (+)violaceous changes 1st/2nd toes of R foot  Access: RIJ tunneled cath      LABS:                        10.0   10.79 )-----------( 239      ( 01 Mar 2025 07:02 )             33.2     Na(131)/K(4.7)/Cl(94)/HCO3(22)/BUN(33)/Cr(5.69)Glu(88)/Ca(8.0)/Mg(--)/PO4(--)    03-01 @ 07:01      IMPRESSION: 74M w/ polio, neurogenic bladder/suprapubic catheter, 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 I just spoke to the Vascular team; they ask he can get HD on Saturday to optimize for OR on Monday. We can plan to forgo HD today and instead dialyze tomorrow.    (2)Anemia - s/p IV iron; on Retacrit with HD    (3)Ortho - nondisplaced acute on chronic patellar fracture - Ortho input appreciated - recommending conservative therapy as inpatient    (4)PAD - input appreciated - on heparin gtt - planned for LE angio, potential stent, and potential bypass Monday 3/3    (5)Dispo - for outpt HD at [facility] in [place] upon discharge. Eventually, we would look to try to transition him to home HD.      RECOMMEND:  (1)HD today - 0.7kg UF as able; Retacrit with HD  (2)No objection to vascular surgery Mon 3/3/25 as planned  (3)HD post-op Monday 3/3      aRfita Denny MD  [facility]  Office/on call physician: [phone]  Cell (7a-7z): [phone]

## 2025-03-01 NOTE — PROGRESS NOTE ADULT - SUBJECTIVE AND OBJECTIVE BOX
Date of Service: 03-01-25 @ 15:10    Patient is a 74y old  Male who presents with a chief complaint of ESRD requiring HD, uremia (01 Mar 2025 10:40)      Any change in ROS: he is lying down in bed:  looks OK with no resp distress;  but lying down flat:  on 4 L of oxygen   today      MEDICATIONS  (STANDING):  acetaminophen   IVPB .. 1000 milliGRAM(s) IV Intermittent once  albuterol/ipratropium for Nebulization 3 milliLiter(s) Nebulizer every 6 hours  atorvastatin 40 milliGRAM(s) Oral at bedtime  calcium acetate 1334 milliGRAM(s) Oral three times a day with meals  chlorhexidine 4% Liquid 1 Application(s) Topical <User Schedule>  epoetin aleah-epbx (RETACRIT) Injectable 40777 Unit(s) IV Push <User Schedule>  epoetin aleah-epbx (RETACRIT) Injectable 03667 Unit(s) IV Push once  guaiFENesin ER 1200 milliGRAM(s) Oral every 12 hours  heparin  Infusion. 1300 Unit(s)/Hr (13 mL/Hr) IV Continuous <Continuous>  hydrOXYzine hydrochloride 25 milliGRAM(s) Oral three times a day  ibuprofen  Tablet. 400 milliGRAM(s) Oral every 12 hours  ketoconazole 2% Shampoo 1 Application(s) Topical <User Schedule>  oxyCODONE  ER Tablet 30 milliGRAM(s) Oral every 12 hours  pantoprazole    Tablet 40 milliGRAM(s) Oral before breakfast    MEDICATIONS  (PRN):  acetaminophen     Tablet .. 650 milliGRAM(s) Oral every 6 hours PRN Temp greater or equal to 38C (100.4F), Mild Pain (1 - 3)  heparin   Injectable 7500 Unit(s) IV Push every 6 hours PRN For aPTT less than 40  heparin   Injectable 3500 Unit(s) IV Push every 6 hours PRN For aPTT between 40 - 57  HYDROmorphone  Injectable 2 milliGRAM(s) IV Push every 6 hours PRN Severe Pain (7 - 10)  sodium chloride 0.9% lock flush 10 milliLiter(s) IV Push every 1 hour PRN Pre/post blood products, medications, blood draw, and to maintain line patency    Vital Signs Last 24 Hrs  T(C): 37 (01 Mar 2025 08:24), Max: 37.6 (01 Mar 2025 00:44)  T(F): 98.6 (01 Mar 2025 08:24), Max: 99.6 (01 Mar 2025 00:44)  HR: 96 (01 Mar 2025 08:24) (69 - 96)  BP: 123/45 (01 Mar 2025 08:24) (100/61 - 179/69)  BP(mean): --  RR: 18 (01 Mar 2025 08:24) (18 - 18)  SpO2: 99% (01 Mar 2025 08:24) (90% - 99%)    Parameters below as of 01 Mar 2025 08:24  Patient On (Oxygen Delivery Method): nasal cannula  O2 Flow (L/min): 3      I&O's Summary    28 Feb 2025 07:01  -  01 Mar 2025 07:00  --------------------------------------------------------  IN: 0 mL / OUT: 965 mL / NET: -965 mL          Physical Exam:   GENERAL: pbese  HEENT: VINNY/   Atraumatic, Normocephalic  ENMT: No tonsillar erythema, exudates, or enlargement; Moist mucous membranes, Good dentition, No lesions  NECK: Supple, No JVD, Normal thyroid  CHEST/LUNG: come basilar crackles bilaterally:   CVS: Regular rate and rhythm; No murmurs, rubs, or gallops  GI: : Soft, Nontender, Nondistended; Bowel sounds present  NERVOUS SYSTEM:  Alert & awake and responds to questions well   EXTREMITIES:  - edema  LYMPH: No lymphadenopathy noted  SKIN: No rashes or lesions  ENDOCRINOLOGY: No Thyromegaly  PSYCH: Appropriate    Labs:  ABG - ( 28 Feb 2025 08:05 )  pH, Arterial: 7.37  pH, Blood: x     /  pCO2: 46    /  pO2: 76    / HCO3: 27    / Base Excess: 0.8   /  SaO2: 98.2            30                            10.0   10.79 )-----------( 239      ( 01 Mar 2025 07:02 )             33.2                         8.8    9.29  )-----------( 178      ( 28 Feb 2025 09:56 )             29.3                         8.6    9.60  )-----------( 164      ( 27 Feb 2025 05:43 )             28.9                         8.7    8.81  )-----------( 168      ( 26 Feb 2025 21:22 )             28.3                         8.5    8.98  )-----------( 180      ( 26 Feb 2025 07:30 )             28.9     03-01    131[L]  |  94[L]  |  33[H]  ----------------------------<  88  4.7   |  22  |  5.69[H]  02-26    135  |  97  |  26[H]  ----------------------------<  73  3.9   |  25  |  5.26[H]    Ca    8.0[L]      01 Mar 2025 07:01    TPro  5.4[L]  /  Alb  2.4[L]  /  TBili  0.2  /  DBili  x   /  AST  11  /  ALT  6[L]  /  AlkPhos  104  02-26    CAPILLARY BLOOD GLUCOSE            PTT - ( 01 Mar 2025 14:03 )  PTT:87.9 sec  Urinalysis Basic - ( 01 Mar 2025 07:01 )    Color: x / Appearance: x / SG: x / pH: x  Gluc: 88 mg/dL / Ketone: x  / Bili: x / Urobili: x   Blood: x / Protein: x / Nitrite: x   Leuk Esterase: x / RBC: x / WBC x   Sq Epi: x / Non Sq Epi: x / Bacteria: x        rad< from: IR Procedure (02.25.25 @ 15:19) >    IMPRESSION:    Insertion of right-sided tunneled dialysis catheter, with tip in the   expected location of the cavoatrial junction.    Plan:    The catheter may be used immediately.    < end of copied text >  < from: CT Chest No Cont (02.23.25 @ 16:46) >    IMPRESSION:  Mucoid impaction and airway associated subpleural groundglass opacities,   most prominent in left lower lobe and to a lesser degree in right lower   lobe.  Mild bronchial wall thickening, likely inflammatory.    --- End of Report ---            TREVON YOUNG MD; Attending Radiologist  This document has been electronically signed. Feb 23 2025  6:22PM    < end of copied text >      RECENT CULTURES:        RESPIRATORY CULTURES:          Studies  Chest X-RAY  CT SCAN Chest   Venous Dopplers: LE:   CT Abdomen  Others

## 2025-03-01 NOTE — PROGRESS NOTE ADULT - SUBJECTIVE AND OBJECTIVE BOX
Subjective: Patient seen and examined. No new events except as noted.   Pt seen at HD     REVIEW OF SYSTEMS:    CONSTITUTIONAL: +weakness, fevers or chills  EYES/ENT: No visual changes;  No vertigo or throat pain   NECK: No pain or stiffness  RESPIRATORY: No cough, wheezing, hemoptysis; No shortness of breath  CARDIOVASCULAR: No chest pain or palpitations  GASTROINTESTINAL: No abdominal or epigastric pain. No nausea, vomiting, or hematemesis; No diarrhea or constipation. No melena or hematochezia.  GENITOURINARY: No dysuria, frequency or hematuria  NEUROLOGICAL: No numbness or weakness  SKIN: No itching, burning, rashes, or lesions   All other review of systems is negative unless indicated above.    MEDICATIONS:  MEDICATIONS  (STANDING):  acetaminophen   IVPB .. 1000 milliGRAM(s) IV Intermittent once  albuterol/ipratropium for Nebulization 3 milliLiter(s) Nebulizer every 6 hours  atorvastatin 40 milliGRAM(s) Oral at bedtime  calcium acetate 1334 milliGRAM(s) Oral three times a day with meals  chlorhexidine 4% Liquid 1 Application(s) Topical <User Schedule>  epoetin aleah-epbx (RETACRIT) Injectable 62919 Unit(s) IV Push <User Schedule>  guaiFENesin ER 1200 milliGRAM(s) Oral every 12 hours  heparin  Infusion. 1300 Unit(s)/Hr (13 mL/Hr) IV Continuous <Continuous>  hydrOXYzine hydrochloride 25 milliGRAM(s) Oral three times a day  ibuprofen  Tablet. 400 milliGRAM(s) Oral every 12 hours  ketoconazole 2% Shampoo 1 Application(s) Topical <User Schedule>  oxyCODONE  ER Tablet 30 milliGRAM(s) Oral every 12 hours  pantoprazole    Tablet 40 milliGRAM(s) Oral before breakfast      PHYSICAL EXAM:  T(C): 36.8 (03-01-25 @ 20:35), Max: 37.6 (03-01-25 @ 00:44)  HR: 98 (03-01-25 @ 20:35) (84 - 98)  BP: 90/51 (03-01-25 @ 20:35) (90/51 - 123/45)  RR: 18 (03-01-25 @ 20:35) (18 - 18)  SpO2: 94% (03-01-25 @ 20:35) (90% - 99%)  Wt(kg): --  I&O's Summary    28 Feb 2025 07:01  -  01 Mar 2025 07:00  --------------------------------------------------------  IN: 0 mL / OUT: 965 mL / NET: -965 mL    01 Mar 2025 07:01  -  01 Mar 2025 21:33  --------------------------------------------------------  IN: 0 mL / OUT: 200 mL / NET: -200 mL          Appearance: Normal	  HEENT:   Normal oral mucosa, PERRL, EOMI	  Lymphatic: No lymphadenopathy  Cardiovascular: Normal S1 S2, No JVD, No murmurs, No edema  Respiratory: Lungs clear to auscultation	  Psychiatry: A & O x 3, Mood & affect appropriate  Skin: No rashes, No ecchymoses, No cyanosis	  Neurologic: Non-focal  GI/Abd: Soft, obese, nontender. Dressing to [facility] C/D/I. Suprapubic catheter in place  Ext: Palp femoral pulses bilat. BLE atrophic, minimal dorsi/plantarflexion bilaterally. Sensation grossly intact. LLE edematous compared to R, erythema to right toes/forefoot. No active wounds bilat.   LLE:  small superficial abrasion, +edema and +ecchymosis over L knee  +TTP over L knee, no TTP along remainder of extremity; compartments soft  Limited ROM at knee 2/2 pain  No gross varus/valgus laxity, but assessment limited 2/2 pain  Motor: TA/EHL/GS/FHL intact  Sensory: DP/SP/Tib/Jordan/Saph SILT  +DP pulse (symmetric relative to contralateral side), WWP  Vascular: Peripheral pulses palpable 2+ bilaterally      LABS:    CARDIAC MARKERS:                                10.0   10.79 )-----------( 239      ( 01 Mar 2025 07:02 )             33.2     03-01    131[L]  |  94[L]  |  33[H]  ----------------------------<  88  4.7   |  22  |  5.69[H]    Ca    8.0[L]      01 Mar 2025 07:01      proBNP:   Lipid Profile:   HgA1c:   TSH:             TELEMETRY: 	    ECG:  	  RADIOLOGY:   DIAGNOSTIC TESTING:  [ ] Echocardiogram:  [ ]  Catheterization:  [ ] Stress Test:    OTHER:

## 2025-03-01 NOTE — PROGRESS NOTE ADULT - SUBJECTIVE AND OBJECTIVE BOX
Patient is a 74y old  Male who presents with a chief complaint of ESRD requiring HD, uremia (01 Mar 2025 15:10)      SUBJECTIVE / OVERNIGHT EVENTS: ptn is smiling, pain free, had HD yest and awaiting another session today as per renal, awaiting RLE angiogram 3/3, on Heparin drip    MEDICATIONS  (STANDING):  acetaminophen   IVPB .. 1000 milliGRAM(s) IV Intermittent once  albuterol/ipratropium for Nebulization 3 milliLiter(s) Nebulizer every 6 hours  atorvastatin 40 milliGRAM(s) Oral at bedtime  calcium acetate 1334 milliGRAM(s) Oral three times a day with meals  chlorhexidine 4% Liquid 1 Application(s) Topical <User Schedule>  epoetin aleah-epbx (RETACRIT) Injectable 89959 Unit(s) IV Push <User Schedule>  epoetin aleah-epbx (RETACRIT) Injectable 94670 Unit(s) IV Push once  guaiFENesin ER 1200 milliGRAM(s) Oral every 12 hours  heparin  Infusion. 1300 Unit(s)/Hr (13 mL/Hr) IV Continuous <Continuous>  hydrOXYzine hydrochloride 25 milliGRAM(s) Oral three times a day  ibuprofen  Tablet. 400 milliGRAM(s) Oral every 12 hours  ketoconazole 2% Shampoo 1 Application(s) Topical <User Schedule>  oxyCODONE  ER Tablet 30 milliGRAM(s) Oral every 12 hours  pantoprazole    Tablet 40 milliGRAM(s) Oral before breakfast    MEDICATIONS  (PRN):  acetaminophen     Tablet .. 650 milliGRAM(s) Oral every 6 hours PRN Temp greater or equal to 38C (100.4F), Mild Pain (1 - 3)  heparin   Injectable 7500 Unit(s) IV Push every 6 hours PRN For aPTT less than 40  heparin   Injectable 3500 Unit(s) IV Push every 6 hours PRN For aPTT between 40 - 57  HYDROmorphone  Injectable 2 milliGRAM(s) IV Push every 6 hours PRN Severe Pain (7 - 10)  sodium chloride 0.9% lock flush 10 milliLiter(s) IV Push every 1 hour PRN Pre/post blood products, medications, blood draw, and to maintain line patency      Vital Signs Last 24 Hrs  T(F): 99.4 (03-01-25 @ 15:12), Max: 99.6 (03-01-25 @ 00:44)  HR: 88 (03-01-25 @ 15:12) (69 - 96)  BP: 121/71 (03-01-25 @ 15:12) (100/61 - 179/69)  RR: 18 (03-01-25 @ 15:12) (18 - 18)  SpO2: 96% (03-01-25 @ 15:12) (90% - 99%)  Telemetry:   CAPILLARY BLOOD GLUCOSE        I&O's Summary    28 Feb 2025 07:01  -  01 Mar 2025 07:00  --------------------------------------------------------  IN: 0 mL / OUT: 965 mL / NET: -965 mL        PHYSICAL EXAM:  GENERAL: NAD, well-developed  HEAD:  Atraumatic, Normocephalic  EYES: EOMI, PERRLA, conjunctiva and sclera clear  NECK: Supple, No JVD  CHEST/LUNG: Clear to auscultation bilaterally; No wheeze  HEART: Regular rate and rhythm; No murmurs, rubs, or gallops  ABDOMEN: Soft, Nontender, Nondistended; Bowel sounds present  EXTREMITIES:  2+ Peripheral Pulses, No clubbing, cyanosis, or edema  PSYCH: AAOx3  NEUROLOGY: non-focal  SKIN: No rashes or lesions    LABS:                        10.0   10.79 )-----------( 239      ( 01 Mar 2025 07:02 )             33.2     03-01    131[L]  |  94[L]  |  33[H]  ----------------------------<  88  4.7   |  22  |  5.69[H]    Ca    8.0[L]      01 Mar 2025 07:01      PTT - ( 01 Mar 2025 14:03 )  PTT:87.9 sec      Urinalysis Basic - ( 01 Mar 2025 07:01 )    Color: x / Appearance: x / SG: x / pH: x  Gluc: 88 mg/dL / Ketone: x  / Bili: x / Urobili: x   Blood: x / Protein: x / Nitrite: x   Leuk Esterase: x / RBC: x / WBC x   Sq Epi: x / Non Sq Epi: x / Bacteria: x        RADIOLOGY & ADDITIONAL TESTS:    Imaging Personally Reviewed:    Consultant(s) Notes Reviewed:      Care Discussed with Consultants/Other Providers:

## 2025-03-01 NOTE — PROGRESS NOTE ADULT - ASSESSMENT
74M with history of HTN, polio with multiple subsequent problems including LE weakness requiring wheelchair, neurogenic bladder s/p suprapubic catheter, and colostomy s/p iatrogenic rectal injury 2/2023, and CKD 5 presenting to the ED with weakness and falls. Admitted for initiation of HD now s/p R IJ shiley placement on 2/17/25 and conversion to tunneled catheter on 2/25. Vascular surgery initially consulted for AVF creation; however, patient's worsening PAD with worsening ischemic changes is the more urgent issue.     Plan:  - planning for RLE Angiogram, possible bilateral, possible angioplasty, possible stent placement, possible femoral-femoral bypass on Monday   - Cardiac clearance documented 2/28; medicine clearance documented 2/27     - AVF creation is on hold  - pending consent  - c/w hep gtt  - continue HD via tunneled catheter > planned for HD today (saturday) in preparation for OR Monday  - continue to protect nondominant left arm  - vascular surgery to follow    Vascular Surgery   [id number]

## 2025-03-01 NOTE — PROGRESS NOTE ADULT - PROBLEM SELECTOR PLAN 3
-Neuro following  -ABG 2/20 acceptable  -Pt lethargic 2/22, ABG never sent but AMS appears to be improving   -ABG this AM with no co2 retention.  3/1: seems alert and awke and responds to questions and then drifts back to sleep  : on narcotics for pain control

## 2025-03-01 NOTE — PROGRESS NOTE ADULT - SUBJECTIVE AND OBJECTIVE BOX
SUBJECTIVE: NAEO.     Vital Signs Last 24 Hrs  T(C): 37 (01 Mar 2025 08:24), Max: 37.6 (01 Mar 2025 00:44)  T(F): 98.6 (01 Mar 2025 08:24), Max: 99.6 (01 Mar 2025 00:44)  HR: 96 (01 Mar 2025 08:24) (69 - 96)  BP: 123/45 (01 Mar 2025 08:24) (100/61 - 179/69)  BP(mean): --  RR: 18 (01 Mar 2025 08:24) (18 - 18)  SpO2: 99% (01 Mar 2025 08:24) (90% - 99%)    Parameters below as of 01 Mar 2025 08:24  Patient On (Oxygen Delivery Method): nasal cannula  O2 Flow (L/min): 3      I&O's Detail    28 Feb 2025 07:01  -  01 Mar 2025 07:00  --------------------------------------------------------  IN:  Total IN: 0 mL    OUT:    Indwelling Catheter - Suprapubic (mL): 965 mL  Total OUT: 965 mL    Total NET: -965 mL        Physical Exam:  General: NAD  Neuro: Awake, alert and oriented x3  Resp: Nonlabored breathing on RA  CV: Hemodynamically stable.   GI/Abd: Soft, obese, nontender. Dressing to [facility] C/D/I. Suprapubic catheter in place  Vascular: Nonpalpable femoral pulses. Nonpalpable DP/PT b/l.   Extremities: BLE atrophic, minimal dorsi/plantarflexion bilaterally. Sensation diminished, LLE edematous compared to R, mottling of right toes/forefoot/heel, No active wounds bilat. \    LABS:                        10.0   10.79 )-----------( 239      ( 01 Mar 2025 07:02 )             33.2     03-01    131[L]  |  94[L]  |  33[H]  ----------------------------<  88  4.7   |  22  |  5.69[H]    Ca    8.0[L]      01 Mar 2025 07:01      PTT - ( 01 Mar 2025 07:03 )  PTT:90.2 sec  Urinalysis Basic - ( 01 Mar 2025 07:01 )    Color: x / Appearance: x / SG: x / pH: x  Gluc: 88 mg/dL / Ketone: x  / Bili: x / Urobili: x   Blood: x / Protein: x / Nitrite: x   Leuk Esterase: x / RBC: x / WBC x   Sq Epi: x / Non Sq Epi: x / Bacteria: x        RADIOLOGY & ADDITIONAL STUDIES:

## 2025-03-02 NOTE — PROGRESS NOTE ADULT - ASSESSMENT
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  came in after fall and for HD.     2/20 CTH neg   \Na 123 --> now 133   A1c 5.7   TSH WNL 3.46   o/e 2/21 AAOx2, uppers 4/5, lowers limited .  2/23; family bedside upset that he has pain in leg after he was moved.  patient going for imaging now.   s/p R IJ permacath by IR 2/25 2/27 AAOx3     Imrpssion  1) AMS, waxing and waing , likely multifactorial from infection, metabolic/electrolyte derrangements/ delerium / medication effect , ureemia which is imporving   2) polio  3) fall 2/2 weakness  4) hyponatremia to 123 2/20  improved 133 -->136       - f/u vascular; plan for procedure monday, angiogram. bypass.   - on heparin drip   - was on zosyn for infection ; now off   - was getting oxycodone ER 30mg BID and oxy PRN IR i10 and dilauded PRN ;   - f/u psych   - limit sedating meds   - continue to monitor and correct metabolic derrangements .  - delerium precuations.   - frequent re orientation  - check b12, RPR, ammonia level   - will consdier MRI brain or EEG if doesn't improve but seems to have been improving   - PT/OT   - check FS, glucose control <180  - GI/DVT ppx  - Thank you for allowing me to participate in the care of this patient. Call with questions.    spoke with wife 2/27   Paul Limon MD  Vascular Neurology  Office: [phone]

## 2025-03-02 NOTE — PROGRESS NOTE ADULT - ASSESSMENT
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 The initial plan was that he would present to the [facility] ER Monday 2/17 for HD initiation. However, day of admission, he fell and sustained trauma to his knee. Called to consult for cough, elevated R hemidiaphragm.

## 2025-03-02 NOTE — PROGRESS NOTE ADULT - SUBJECTIVE AND OBJECTIVE BOX
Neurology      S; patient seen. no neuro changes      Medications: MEDICATIONS  (STANDING):  acetaminophen   IVPB .. 1000 milliGRAM(s) IV Intermittent once  albuterol/ipratropium for Nebulization 3 milliLiter(s) Nebulizer every 6 hours  atorvastatin 40 milliGRAM(s) Oral at bedtime  calcium acetate 1334 milliGRAM(s) Oral three times a day with meals  chlorhexidine 4% Liquid 1 Application(s) Topical <User Schedule>  epoetin aleah-epbx (RETACRIT) Injectable 47258 Unit(s) IV Push <User Schedule>  guaiFENesin ER 1200 milliGRAM(s) Oral every 12 hours  heparin  Infusion. 1300 Unit(s)/Hr (13 mL/Hr) IV Continuous <Continuous>  hydrOXYzine hydrochloride 25 milliGRAM(s) Oral three times a day  ibuprofen  Tablet. 400 milliGRAM(s) Oral every 12 hours  ketoconazole 2% Shampoo 1 Application(s) Topical <User Schedule>  oxyCODONE  ER Tablet 30 milliGRAM(s) Oral every 12 hours  pantoprazole    Tablet 40 milliGRAM(s) Oral before breakfast    MEDICATIONS  (PRN):  acetaminophen     Tablet .. 650 milliGRAM(s) Oral every 6 hours PRN Temp greater or equal to 38C (100.4F), Mild Pain (1 - 3)  heparin   Injectable 7500 Unit(s) IV Push every 6 hours PRN For aPTT less than 40  heparin   Injectable 3500 Unit(s) IV Push every 6 hours PRN For aPTT between 40 - 57  HYDROmorphone  Injectable 2 milliGRAM(s) IV Push every 6 hours PRN Severe Pain (7 - 10)  oxyCODONE    IR 10 milliGRAM(s) Oral every 6 hours PRN Moderate Pain (4 - 6)  sodium chloride 0.9% lock flush 10 milliLiter(s) IV Push every 1 hour PRN Pre/post blood products, medications, blood draw, and to maintain line patency       Vitals:  Vital Signs Last 24 Hrs  T(C): 36.8 (02 Mar 2025 07:40), Max: 37 (02 Mar 2025 00:10)  T(F): 98.3 (02 Mar 2025 07:40), Max: 98.6 (02 Mar 2025 00:10)  HR: 84 (02 Mar 2025 07:40) (84 - 98)  BP: 108/66 (02 Mar 2025 07:40) (90/51 - 128/76)  BP(mean): --  RR: 18 (02 Mar 2025 10:42) (18 - 19)  SpO2: 96% (02 Mar 2025 10:42) (81% - 96%)    Parameters below as of 02 Mar 2025 10:42  Patient On (Oxygen Delivery Method): nasal cannula  O2 Flow (L/min): 4        General Appearance: Appropriately dressed and in no acute distress       Head: Normocephalic, atraumatic and no dysmorphic features  Ear, Nose, and Throat: Moist mucous membranes  CVS: S1S2+  Resp: No SOB, no wheeze or rhonchi  GI: soft NT/ND  Extremities: LE PVD   Skin: + decub      Neurological Exam:  Mental Status: Awake, alert and oriented x 2.  Able to follow simple and complex verbal commands. Able to name and repeat. fluent speech. No obvious aphasia or dysarthria noted.   Cranial Nerves: PERRL, EOMI, VFFC, sensation V1-V3 intact,  no obvious facial asymmetry, equal elevation of palate, scm/trap 5/5, tongue is midline on protrusion. no obvious papilledema on fundoscopic exam. hearing is grossly intact.   Motor: Normal bulk, tone and strength throughout uppers 4/ lowers 0-/1 5   Sensation: Intact to light touch and pinprick throughout.     Coordination: No dysmetria on FNF   Gait: deferred, wheelchair bound     Data/Labs/Imaging which I personally reviewed.        LABS:   LABS:                          8.9    16.50 )-----------( 220      ( 02 Mar 2025 07:28 )             29.5     03-01    131[L]  |  94[L]  |  33[H]  ----------------------------<  88  4.7   |  22  |  5.69[H]    Ca    8.0[L]      01 Mar 2025 07:01        PT/INR - ( 02 Mar 2025 13:53 )   PT: 20.8 sec;   INR: 1.84 ratio         PTT - ( 02 Mar 2025 13:53 )  PTT:55.0 sec  Urinalysis Basic - ( 01 Mar 2025 07:01 )    Color: x / Appearance: x / SG: x / pH: x  Gluc: 88 mg/dL / Ketone: x  / Bili: x / Urobili: x   Blood: x / Protein: x / Nitrite: x   Leuk Esterase: x / RBC: x / WBC x   Sq Epi: x / Non Sq Epi: x / Bacteria: x        < from: CT Head No Cont (02.20.25 @ 18:47) >    ACC: [id number] EXAM:  CT BRAIN   ORDERED BY: GUY DE LA CRUZ     PROCEDURE DATE:  02/20/2025          INTERPRETATION:  CLINICAL INDICATION: Altered mental status    TECHNIQUE: Axial CT scanning of the brain was obtained from the skull   base to the vertex without the administration of intravenous contrast.   Reformatted coronal and sagittal images were subsequently obtained and   reviewed.    COMPARISON: None    FINDINGS:  There is no CT evidence of acute transcortical infarct. Age-related   involutional changes and chronic microvascular ischemic changes.    There is no hydrocephalus, mass effect, or acute intracranial hemorrhage.   No extra-axial collection. Basal cisterns are patent.    The visualized paranasal sinuses and mastoid air cells are clear.    The calvarium is intact.    IMPRESSION:  No evidence of acute transcortical infarct, acute intracranial   hemorrhage, or mass effect.    --- End of Report ---            CORTNEY REARDON MD; Attending Radiologist  This document has been electronically signed. Feb 20 2025  7:07PM    < end of copied text >

## 2025-03-02 NOTE — PROGRESS NOTE ADULT - ASSESSMENT
74M with history of HTN, polio with multiple subsequent problems including LE weakness requiring wheelchair, neurogenic bladder s/p suprapubic catheter, and colostomy s/p iatrogenic rectal injury 2/2023, and CKD 5 presenting to the ED with weakness and falls. Admitted for initiation of HD now s/p R IJ shiley placement on 2/17/25 and conversion to tunneled catheter on 2/25. Vascular surgery initially consulted for AVF creation; however, patient's worsening PAD with worsening ischemic changes is the more urgent issue.     Plan:  - planning for RLE Angiogram, possible bilateral, possible angioplasty, possible stent placement, possible femoral-femoral bypass on Monday      - please pre op: NPO at [facility], 4 am labs : CBC, BMP, Phos, Mg, Type and Screen, Coags  - Cardiac clearance documented 2/28; medicine clearance documented 2/27     - AVF creation is on hold  - will consent today  - c/w hep gtt  - s/p HD Saturday  - continue to protect nondominant left arm  - vascular surgery to follow    Vascular Surgery   [id number]

## 2025-03-02 NOTE — PROGRESS NOTE ADULT - SUBJECTIVE AND OBJECTIVE BOX
Bedside shift change report given to Marco A Galvez (oncoming nurse) by Ramone Rangel (offgoing nurse). Report included the following information SBAR, Kardex and MAR. Subjective: Patient seen and examined. No new events except as noted.     REVIEW OF SYSTEMS:    CONSTITUTIONAL: + weakness, fevers or chills  EYES/ENT: No visual changes;  No vertigo or throat pain   NECK: No pain or stiffness  RESPIRATORY: No cough, wheezing, hemoptysis; No shortness of breath  CARDIOVASCULAR: No chest pain or palpitations  GASTROINTESTINAL: No abdominal or epigastric pain. No nausea, vomiting, or hematemesis; No diarrhea or constipation. No melena or hematochezia.  GENITOURINARY: No dysuria, frequency or hematuria  NEUROLOGICAL: No numbness or weakness  SKIN: No itching, burning, rashes, or lesions   All other review of systems is negative unless indicated above.    MEDICATIONS:  MEDICATIONS  (STANDING):  acetaminophen   IVPB .. 1000 milliGRAM(s) IV Intermittent once  albuterol/ipratropium for Nebulization 3 milliLiter(s) Nebulizer every 6 hours  atorvastatin 40 milliGRAM(s) Oral at bedtime  calcium acetate 1334 milliGRAM(s) Oral three times a day with meals  chlorhexidine 4% Liquid 1 Application(s) Topical <User Schedule>  epoetin aleah-epbx (RETACRIT) Injectable 15161 Unit(s) IV Push <User Schedule>  guaiFENesin ER 1200 milliGRAM(s) Oral every 12 hours  heparin  Infusion. 1300 Unit(s)/Hr (13 mL/Hr) IV Continuous <Continuous>  hydrOXYzine hydrochloride 25 milliGRAM(s) Oral three times a day  ibuprofen  Tablet. 400 milliGRAM(s) Oral every 12 hours  ketoconazole 2% Shampoo 1 Application(s) Topical <User Schedule>  oxyCODONE  ER Tablet 30 milliGRAM(s) Oral every 12 hours  pantoprazole    Tablet 40 milliGRAM(s) Oral before breakfast      PHYSICAL EXAM:  T(C): 36.8 (03-02-25 @ 07:40), Max: 37.4 (03-01-25 @ 15:12)  HR: 84 (03-02-25 @ 07:40) (84 - 98)  BP: 108/66 (03-02-25 @ 07:40) (90/51 - 128/76)  RR: 18 (03-02-25 @ 07:40) (18 - 18)  SpO2: 90% (03-02-25 @ 07:40) (90% - 99%)  Wt(kg): --  I&O's Summary    01 Mar 2025 07:01  -  02 Mar 2025 07:00  --------------------------------------------------------  IN: 0 mL / OUT: 200 mL / NET: -200 mL            Appearance: NAD  HEENT:  Dry oral mucosa, PERRL, EOMI	  Lymphatic: No lymphadenopathy  Cardiovascular: Normal S1 S2, No JVD, No murmurs, No edema  Respiratory: Lungs clear to auscultation	  Psychiatry: A & O x 3, Mood & affect appropriate  Skin: No rashes, No ecchymoses, No cyanosis	  Neurologic: Non-focal  GI/Abd: Soft, obese, nontender. Dressing to Q C/D/I. Suprapubic catheter in place  Ext: Palp femoral pulses bilat. BLE atrophic, minimal dorsi/plantarflexion bilaterally. Sensation grossly intact. LLE edematous compared to R, erythema to right toes/forefoot. No active wounds bilat.   LLE:  small superficial abrasion, +edema and +ecchymosis over L knee  +TTP over L knee, no TTP along remainder of extremity; compartments soft  Limited ROM at knee 2/2 pain  No gross varus/valgus laxity, but assessment limited 2/2 pain  Motor: TA/EHL/GS/FHL intact  Sensory: DP/SP/Tib/Jordan/Saph SILT  +DP pulse (symmetric relative to contralateral side), WWP  Vascular: Peripheral pulses palpable 2+ bilaterally        LABS:    CARDIAC MARKERS:                                8.9    16.50 )-----------( 220      ( 02 Mar 2025 07:28 )             29.5     03-01    131[L]  |  94[L]  |  33[H]  ----------------------------<  88  4.7   |  22  |  5.69[H]    Ca    8.0[L]      01 Mar 2025 07:01      proBNP:   Lipid Profile:   HgA1c:   TSH:             TELEMETRY: 	    ECG:  	  RADIOLOGY:   DIAGNOSTIC TESTING:  [ ] Echocardiogram:  [ ]  Catheterization:  [ ] Stress Test:    OTHER:

## 2025-03-02 NOTE — PROGRESS NOTE ADULT - SUBJECTIVE AND OBJECTIVE BOX
SUBJECTIVE: NAEO. S/p HD yesterday -200.    Vital Signs Last 24 Hrs  T(C): 36.8 (02 Mar 2025 07:40), Max: 37.4 (01 Mar 2025 15:12)  T(F): 98.3 (02 Mar 2025 07:40), Max: 99.4 (01 Mar 2025 15:12)  HR: 84 (02 Mar 2025 07:40) (84 - 98)  BP: 108/66 (02 Mar 2025 07:40) (90/51 - 128/76)  BP(mean): --  RR: 18 (02 Mar 2025 07:40) (18 - 18)  SpO2: 90% (02 Mar 2025 07:40) (90% - 99%)    Parameters below as of 02 Mar 2025 07:40  Patient On (Oxygen Delivery Method): room air        I&O's Detail    01 Mar 2025 07:01  -  02 Mar 2025 07:00  --------------------------------------------------------  IN:  Total IN: 0 mL    OUT:    Other (mL): 200 mL  Total OUT: 200 mL    Total NET: -200 mL      Physical Exam:  General: NAD  Neuro: Awake, alert and oriented x3  Resp: Nonlabored breathing on RA  CV: Hemodynamically stable.   GI/Abd: Soft, obese, nontender. Dressing to LLQ C/D/I. Suprapubic catheter in place  Vascular: Nonpalpable femoral pulses. Nonpalpable DP/PT b/l.   Extremities: BLE atrophic, minimal dorsi/plantarflexion bilaterally. Sensation diminished, LLE edematous compared to R, mottling of right toes/forefoot/heel, No active wounds bilat.    LABS:                        10.0   10.79 )-----------( 239      ( 01 Mar 2025 07:02 )             33.2     03-01    131[L]  |  94[L]  |  33[H]  ----------------------------<  88  4.7   |  22  |  5.69[H]    Ca    8.0[L]      01 Mar 2025 07:01      PTT - ( 01 Mar 2025 14:03 )  PTT:87.9 sec  Urinalysis Basic - ( 01 Mar 2025 07:01 )    Color: x / Appearance: x / SG: x / pH: x  Gluc: 88 mg/dL / Ketone: x  / Bili: x / Urobili: x   Blood: x / Protein: x / Nitrite: x   Leuk Esterase: x / RBC: x / WBC x   Sq Epi: x / Non Sq Epi: x / Bacteria: x        RADIOLOGY & ADDITIONAL STUDIES:

## 2025-03-02 NOTE — PROGRESS NOTE ADULT - PROBLEM SELECTOR PLAN 1
-CXR with elevated R hemidiaphragm (not present on CXR in December 2024)  -Low grade temp, cough  -CT chest with LLL, RLL GGO, mucoid impacted airways. No clear PNA. Monitoring off ABX per ID  -Suggest Duoneb q6h, Mucinex 1200 mg PO BID   -S/p Mucomyst x5 days   -Incentive spirometry   -Normoxic, keep sats >90% with o2 PRN  -ABG 2/28 with no co2 retention  -CXR reviewed, f/u official read. -CXR with elevated R hemidiaphragm (not present on CXR in December 2024)  -Low grade temp, cough  -CT chest with LLL, RLL GGO, mucoid impacted airways. No clear PNA. Monitoring off ABX per ID  -Suggest Duoneb q6h, Mucinex 1200 mg PO BID   -S/p Mucomyst x5 days   -Incentive spirometry   -Normoxic, keep sats >90% with o2 PRN  -ABG 2/28 with no co2 retention  -CXR 3/1 with improved R hemidiaphragm, low lung volumes on L.

## 2025-03-02 NOTE — PROGRESS NOTE ADULT - ASSESSMENT
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 He is well known to me from multiple recent admisions  s/p admissions at [facility] 12/20-12/25/24 and 2/4-2/7 with SONDRA on CKD with associated uremic symptoms.   At the last admission he had expressed strong interest to try to hold off with HD intiation but he has been getting worse clinically at home.  His SONDRA and uremic symptoms significantly improved after the first admission; they improved a to mild extent during the 2nd admission to the point where he was able to be discharged without HD. Since then, however, he has worsened further.   He has been suffering from progressively worsening diffuse pruritus, loss of appetite, and generalized weakness and falls.   His nephrologist and PCP has been speaking with him and his family over the past few days,   The initial plan was that he would present to the [facility] ER Monday 2/17 (ie tomorrow) for HD initiation. Today, however, he fell and sustained trauma to his knee. Given the fall and concern for knee fracture, he presented to the ER today. multiple xrays show no Fractures.      CKD5, uremia, requiring initiation of HD  Rash, seborrheic dermatitis  Pruritis  Anemia  PAD  SP catheter, chronic  Left inferior pole acute on chronic patella Fx    plan  - HD via RIJ permacath    -3/1-3/2: ptn is smiling, pain free, had HD 2/28 and 3/1, awaiting RLE angiogram 3/3, on Heparin drip, euvolemic  -2/28: ptn is lethargic, awaiting RLE angiogram possible fem-fem bypass hopefully on 3/3 pending OR availability, awaiting HD today    -2/27:  plan for angiogram in am with possible angioplasty, possible stent, possible fem-fem bypass. medically cleared for angiogram and possible surgery, stent, angioplasty. pain is controlled. remains on heparin drip as per vascular. HD as per renal    -2/26:  ptn is sleeping, pain is controlled, he was seen by wound care and vascular attending. planning on angiogram 2/2 severe PAD in LE on CTA. he also spoke to HCP. ptn and HCP in agreement. ptn was started on HEPARIN drip as per vascular recs. RIJ permacath done 2/25    - 2/25: ptn states he is hallucinating, but not at present, his MS is at baseline, his pain is controlled, he still needs prn pain meds when he is moved in the bed or for testing or for HD. awaiting Permacath, AVF creation, LE bypass to be d/w Dr. Swenson and the ptn tomorrow. ptn has cardiology clearance  if he opts for. Ptn has sacral decubiti and posterior thigh and buttock decibiti and b/l heel decubiti. Z flow fabienne d/w RN, get wound care consult    -2/24: pain is controlled  if the LLE is immobile and fully extended. doesn't tolerate the knee immobilizer. has a medium condyle and acute on chronic patella fx in LEFT knee. as per vascular ptn will need iliac stent  w a fem-fem bypass, possible axillo-femoral bypass. for this surgery he would need cardiac work up . more pressing surgery is LUE AVF creation,  in IR will arrange for Permacath. for pain control: Motrin 400 mg q12H, Oxy ER 30 mg q12H, Oxy IR 10 for mod pain and dilaudid 2 mg iv for severe pain. still has pruritis though improved, will raise atarax 25 mg to tid. plan of care d/w daughter Norma Persaud , ptn and his wife. Also d/w renal, card, vascular, ACP    -2/23: Daughter Cori is the HCP, she and the ptn filled out the paperwork. daily plan and findings d/w her and the ptn. MS is at abseline, c/o b/l LE foot pain and Left Knee pain. xrays of left knee: cannot r/o acute on chronic inferior pole patellar Fx. knee immobilizer is on, awaiting ortho consult. doubt needs any intervention awaiting Ct chest today, will add CT Left knee. ptn states itching has recurred, severe pain has recurred. will resume Atatrax ( 25 mg bid) and Oxycodone ER , but at a lower dose 15 mg q12H. cont prn analgesics. HD as per renal, awaiting Vascular consult f/u re CTA A/L &LE and recs. ptn also wants AVF surgery on this admission. Coughing has stopped    - 2/22: ptn is drowsy but arousable, calmer today, answers questions appropriately. he is pain free, he stopped coughing, he denies having pruritis: will DC:  OXY ER, Atarax, Tessalon perles.     -  2/21: ptn is tearful, a bit confused, coughing, recognizes me and family at bedside. GOC d/w daughter and wife. AMS prob delirium 2/2 acute illness +/- opiods, lyrica, atarac. will lower atarak to tid, dc lyrica, cont Oxy 2/2 ptn has severe LE pain. neuro called for eval. Head ct done yesterday w no acute findings. CTA A/P w LE run fof: severe PAD, vascular to follow up on plan fo care. plan for HD tomorrow and next week place on tiw schedule of MWF. will need tunneled catheter prior to DC and will d/w vascular scheduling of AVF. ptn wants all to be done while inptn. daughter wants to be HCP as per ptn's wishes. she was given paperwork to get it signed and witnessed and will present to nursing thereafter. details of findings and complaints and plan of care d/w daughter and wife. spent 60 min. RVP ordered. start mucinex , tessalon perles, duonebs, get CT chest to r/o PNA. pulm called    -  2/20:  ptn had RRT 2/2 AMS and tremors. no SZ, no LOC. noted to have Na 123( hyponatremia), given 500 cc NS. Gabapentin DCed. ptn had been taking gabapentin at home PTA. Pain is controlled. Pruritis resolved, tolerating HD otherwise.     - Pain management:  cont Oxycodone 10 IR q6H,  DIlaudid prn severe pain 2 mg q4H prn.   - cont outptn meds  - DVT ppx w HSC

## 2025-03-02 NOTE — PROVIDER CONTACT NOTE (OTHER) - ASSESSMENT
informed her. RN then contacted [facility]guin, and scanned drip with Kimmy Manager of [facility]. Instructions were to hold heparin infusion for one hour and then restart at 3cc less than recent rate (would have started back at 12:45-1pm at 10cc/hr

## 2025-03-02 NOTE — PROGRESS NOTE ADULT - ASSESSMENT
on nasal cannula  s/p HD Saturday  tentatively for aortagram, possible b/l iliac stent placement, possible fem-fem bypass per vascular  HD  post op Monday 3/3/25      acetaminophen     Tablet .. 650 milliGRAM(s) Oral every 6 hours PRN  acetaminophen   IVPB .. 1000 milliGRAM(s) IV Intermittent once  albuterol/ipratropium for Nebulization 3 milliLiter(s) Nebulizer every 6 hours  atorvastatin 40 milliGRAM(s) Oral at bedtime  calcium acetate 1334 milliGRAM(s) Oral three times a day with meals  chlorhexidine 4% Liquid 1 Application(s) Topical <User Schedule>  epoetin aleah-epbx (RETACRIT) Injectable 61870 Unit(s) IV Push <User Schedule>  guaiFENesin ER 1200 milliGRAM(s) Oral every 12 hours  heparin   Injectable 7500 Unit(s) IV Push every 6 hours PRN  heparin   Injectable 3500 Unit(s) IV Push every 6 hours PRN  heparin  Infusion. 1300 Unit(s)/Hr IV Continuous <Continuous>  HYDROmorphone  Injectable 2 milliGRAM(s) IV Push every 6 hours PRN  hydrOXYzine hydrochloride 25 milliGRAM(s) Oral three times a day  ibuprofen  Tablet. 400 milliGRAM(s) Oral every 12 hours  ketoconazole 2% Shampoo 1 Application(s) Topical <User Schedule>  oxyCODONE    IR 10 milliGRAM(s) Oral every 6 hours PRN  oxyCODONE  ER Tablet 30 milliGRAM(s) Oral every 12 hours  pantoprazole    Tablet 40 milliGRAM(s) Oral before breakfast  sodium chloride 0.9% lock flush 10 milliLiter(s) IV Push every 1 hour PRN      VITAL:  T(C): , Max: 37.4 (03-01-25 @ 15:12)  T(F): , Max: 99.4 (03-01-25 @ 15:12)  HR: 84 (03-02-25 @ 07:40)  BP: 108/66 (03-02-25 @ 07:40)  BP(mean): --  RR: 18 (03-02-25 @ 10:42)  SpO2: 96% (03-02-25 @ 10:42)  Wt(kg): --    03-01-25 @ 07:01  -  03-02-25 @ 07:00  --------------------------------------------------------  IN: 0 mL / OUT: 200 mL / NET: -200 mL    03-02-25 @ 07:01  -  03-02-25 @ 11:44  --------------------------------------------------------  IN: 0 mL / OUT: 150 mL / NET: -150 mL        PHYSICAL EXAM:  Constitutional: alert, NAD  HEENT: NCAT, DMM  Neck: Supple, No JVD  Respiratory: CTA-b/l  Cardiovascular: RRR s1s2, no m/r/g  Gastrointestinal: BS+, soft, NT/ND  Extremities: No peripheral edema b/l  Neurological: reduced generalized strength  Back: no CVAT b/l  Skin: (+)violaceous changes 1st/2nd toes of R foot  Access: [facility] tunneled cath    LABS:                          8.9    16.50 )-----------( 220      ( 02 Mar 2025 07:28 )             29.5     Na(131)/K(4.7)/Cl(94)/HCO3(22)/BUN(33)/Cr(5.69)Glu(88)/Ca(8.0)/Mg(--)/PO4(--)    03-01 @ 07:01    Urinalysis Basic - ( 01 Mar 2025 07:01 )    Color: x / Appearance: x / SG: x / pH: x  Gluc: 88 mg/dL / Ketone: x  / Bili: x / Urobili: x   Blood: x / Protein: x / Nitrite: x   Leuk Esterase: x / RBC: x / WBC x   Sq Epi: x / Non Sq Epi: x / Bacteria: x                IMPRESSION: 74M w/ polio, neurogenic bladder/suprapubic catheter, 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 S/p HD Saturday  to optimize for OR on Monday.    (2)Anemia - s/p IV iron; on Retacrit with HD    (3)Ortho - nondisplaced acute on chronic patellar fracture - Ortho input appreciated - recommending conservative therapy as inpatient    (4)PAD - input appreciated - on heparin gtt - planned for LE angio, potential stent, and potential bypass Monday 3/3    (5)Dispo - for outpt HD at [facility] in [place] upon discharge. Eventually, would look to try to transition him to home HD.      RECOMMEND:  (1)No objection to vascular surgery Mon 3/3/25 as planned  (2)HD post-op Monday 3/3,Retacrit with HD      Zaid Ricci NP-BC  [facility]  [phone]

## 2025-03-02 NOTE — PROGRESS NOTE ADULT - SUBJECTIVE AND OBJECTIVE BOX
Patient is a 74y old  Male who presents with a chief complaint of ESRD requiring HD, uremia (02 Mar 2025 11:44)      SUBJECTIVE / OVERNIGHT EVENTS: ptn is stable, awaiting angiogram 3/3    MEDICATIONS  (STANDING):  acetaminophen   IVPB .. 1000 milliGRAM(s) IV Intermittent once  albuterol/ipratropium for Nebulization 3 milliLiter(s) Nebulizer every 6 hours  atorvastatin 40 milliGRAM(s) Oral at bedtime  calcium acetate 1334 milliGRAM(s) Oral three times a day with meals  chlorhexidine 4% Liquid 1 Application(s) Topical <User Schedule>  epoetin aleah-epbx (RETACRIT) Injectable 85074 Unit(s) IV Push <User Schedule>  guaiFENesin ER 1200 milliGRAM(s) Oral every 12 hours  heparin  Infusion. 1300 Unit(s)/Hr (13 mL/Hr) IV Continuous <Continuous>  hydrOXYzine hydrochloride 25 milliGRAM(s) Oral three times a day  ibuprofen  Tablet. 400 milliGRAM(s) Oral every 12 hours  ketoconazole 2% Shampoo 1 Application(s) Topical <User Schedule>  oxyCODONE  ER Tablet 30 milliGRAM(s) Oral every 12 hours  pantoprazole    Tablet 40 milliGRAM(s) Oral before breakfast    MEDICATIONS  (PRN):  acetaminophen     Tablet .. 650 milliGRAM(s) Oral every 6 hours PRN Temp greater or equal to 38C (100.4F), Mild Pain (1 - 3)  heparin   Injectable 7500 Unit(s) IV Push every 6 hours PRN For aPTT less than 40  heparin   Injectable 3500 Unit(s) IV Push every 6 hours PRN For aPTT between 40 - 57  HYDROmorphone  Injectable 2 milliGRAM(s) IV Push every 6 hours PRN Severe Pain (7 - 10)  oxyCODONE    IR 10 milliGRAM(s) Oral every 6 hours PRN Moderate Pain (4 - 6)  sodium chloride 0.9% lock flush 10 milliLiter(s) IV Push every 1 hour PRN Pre/post blood products, medications, blood draw, and to maintain line patency      Vital Signs Last 24 Hrs  T(F): 98.3 (03-02-25 @ 07:40), Max: 99.4 (03-01-25 @ 15:12)  HR: 84 (03-02-25 @ 07:40) (84 - 98)  BP: 108/66 (03-02-25 @ 07:40) (90/51 - 128/76)  RR: 18 (03-02-25 @ 10:42) (18 - 19)  SpO2: 96% (03-02-25 @ 10:42) (81% - 96%)  Telemetry:   CAPILLARY BLOOD GLUCOSE      POCT Blood Glucose.: 106 mg/dL (01 Mar 2025 19:04)    I&O's Summary    01 Mar 2025 07:01  -  02 Mar 2025 07:00  --------------------------------------------------------  IN: 0 mL / OUT: 200 mL / NET: -200 mL    02 Mar 2025 07:01  -  02 Mar 2025 12:03  --------------------------------------------------------  IN: 0 mL / OUT: 150 mL / NET: -150 mL        PHYSICAL EXAM:  GENERAL: NAD, well-developed  HEAD:  Atraumatic, Normocephalic  EYES: EOMI, PERRLA, conjunctiva and sclera clear  NECK: Supple, No JVD  CHEST/LUNG: Clear to auscultation bilaterally; No wheeze  HEART: Regular rate and rhythm; No murmurs, rubs, or gallops  ABDOMEN: Soft, Nontender, Nondistended; Bowel sounds present  EXTREMITIES:  2+ Peripheral Pulses, No clubbing, cyanosis, or edema  PSYCH: AAOx3  NEUROLOGY: non-focal  SKIN: No rashes or lesions    LABS:                        8.9    16.50 )-----------( 220      ( 02 Mar 2025 07:28 )             29.5     03-01    131[L]  |  94[L]  |  33[H]  ----------------------------<  88  4.7   |  22  |  5.69[H]    Ca    8.0[L]      01 Mar 2025 07:01      PTT - ( 02 Mar 2025 10:48 )  PTT:196.7 sec      Urinalysis Basic - ( 01 Mar 2025 07:01 )    Color: x / Appearance: x / SG: x / pH: x  Gluc: 88 mg/dL / Ketone: x  / Bili: x / Urobili: x   Blood: x / Protein: x / Nitrite: x   Leuk Esterase: x / RBC: x / WBC x   Sq Epi: x / Non Sq Epi: x / Bacteria: x        RADIOLOGY & ADDITIONAL TESTS:    Imaging Personally Reviewed:    Consultant(s) Notes Reviewed:      Care Discussed with Consultants/Other Providers:

## 2025-03-02 NOTE — PROGRESS NOTE ADULT - SUBJECTIVE AND OBJECTIVE BOX
Date of Service: 03-02-25 @ 09:35    Patient is a 74y old  Male who presents with a chief complaint of ESRD requiring HD, uremia (02 Mar 2025 08:23)      Any change in ROS:   Alert but slightly confused  Breathing nonlabored   O2 sats 92% RA     MEDICATIONS  (STANDING):  acetaminophen   IVPB .. 1000 milliGRAM(s) IV Intermittent once  albuterol/ipratropium for Nebulization 3 milliLiter(s) Nebulizer every 6 hours  atorvastatin 40 milliGRAM(s) Oral at bedtime  calcium acetate 1334 milliGRAM(s) Oral three times a day with meals  chlorhexidine 4% Liquid 1 Application(s) Topical <User Schedule>  epoetin aleah-epbx (RETACRIT) Injectable 37073 Unit(s) IV Push <User Schedule>  guaiFENesin ER 1200 milliGRAM(s) Oral every 12 hours  heparin  Infusion. 1300 Unit(s)/Hr (13 mL/Hr) IV Continuous <Continuous>  hydrOXYzine hydrochloride 25 milliGRAM(s) Oral three times a day  ibuprofen  Tablet. 400 milliGRAM(s) Oral every 12 hours  ketoconazole 2% Shampoo 1 Application(s) Topical <User Schedule>  oxyCODONE  ER Tablet 30 milliGRAM(s) Oral every 12 hours  pantoprazole    Tablet 40 milliGRAM(s) Oral before breakfast    MEDICATIONS  (PRN):  acetaminophen     Tablet .. 650 milliGRAM(s) Oral every 6 hours PRN Temp greater or equal to 38C (100.4F), Mild Pain (1 - 3)  heparin   Injectable 7500 Unit(s) IV Push every 6 hours PRN For aPTT less than 40  heparin   Injectable 3500 Unit(s) IV Push every 6 hours PRN For aPTT between 40 - 57  HYDROmorphone  Injectable 2 milliGRAM(s) IV Push every 6 hours PRN Severe Pain (7 - 10)  sodium chloride 0.9% lock flush 10 milliLiter(s) IV Push every 1 hour PRN Pre/post blood products, medications, blood draw, and to maintain line patency    Vital Signs Last 24 Hrs  T(C): 36.8 (02 Mar 2025 07:40), Max: 37.4 (01 Mar 2025 15:12)  T(F): 98.3 (02 Mar 2025 07:40), Max: 99.4 (01 Mar 2025 15:12)  HR: 84 (02 Mar 2025 07:40) (84 - 98)  BP: 108/66 (02 Mar 2025 07:40) (90/51 - 128/76)  BP(mean): --  RR: 18 (02 Mar 2025 07:40) (18 - 18)  SpO2: 90% (02 Mar 2025 07:40) (90% - 96%)    Parameters below as of 02 Mar 2025 07:40  Patient On (Oxygen Delivery Method): room air        I&O's Summary    01 Mar 2025 07:01  -  02 Mar 2025 07:00  --------------------------------------------------------  IN: 0 mL / OUT: 200 mL / NET: -200 mL          Physical Exam:   GENERAL: NAD, well-groomed, well-developed  HEENT: VINNY/   Atraumatic, Normocephalic  ENMT: No tonsillar erythema, exudates, or enlargement; Moist mucous membranes, Good dentition, No lesions  NECK: Supple, No JVD, Normal thyroid  CHEST/LUNG: grossly clear anteriorly   CVS: Regular rate and rhythm; No murmurs, rubs, or gallops  GI: : Soft, Nontender, Nondistended; Bowel sounds present  NERVOUS SYSTEM:  Alert & Oriented X2, confused at times   EXTREMITIES:  atrophic, mottled toes   LYMPH: No lymphadenopathy noted  SKIN: No rashes or lesions  ENDOCRINOLOGY: No Thyromegaly  PSYCH: Appropriate    Labs:  30                            8.9    16.50 )-----------( 220      ( 02 Mar 2025 07:28 )             29.5                         10.0   10.79 )-----------( 239      ( 01 Mar 2025 07:02 )             33.2                         8.8    9.29  )-----------( 178      ( 28 Feb 2025 09:56 )             29.3                         8.6    9.60  )-----------( 164      ( 27 Feb 2025 05:43 )             28.9                         8.7    8.81  )-----------( 168      ( 26 Feb 2025 21:22 )             28.3     03-01    131[L]  |  94[L]  |  33[H]  ----------------------------<  88  4.7   |  22  |  5.69[H]    Ca    8.0[L]      01 Mar 2025 07:01      CAPILLARY BLOOD GLUCOSE      POCT Blood Glucose.: 106 mg/dL (01 Mar 2025 19:04)        PTT - ( 01 Mar 2025 14:03 )  PTT:87.9 sec  Urinalysis Basic - ( 01 Mar 2025 07:01 )    Color: x / Appearance: x / SG: x / pH: x  Gluc: 88 mg/dL / Ketone: x  / Bili: x / Urobili: x   Blood: x / Protein: x / Nitrite: x   Leuk Esterase: x / RBC: x / WBC x   Sq Epi: x / Non Sq Epi: x / Bacteria: x        < from: CT Chest No Cont (02.23.25 @ 16:46) >    ACC: [id number] EXAM:  CT CHEST   ORDERED BY:  NELL TOLBERT     PROCEDURE DATE:  02/23/2025          INTERPRETATION:  CLINICAL INFORMATION: 74-year-old male with cough    TECHNIQUE: A volumetric CT acquisition of the chest was obtained from the   thoracic inlet to the upper abdomen, without administration of   intravenous contrast. This CT scan was performed with one or more of the   following dose optimization: iterative reconstruction, automatic exposure   control, and/or manual adjustmentof mAs and kVp according to the   patient's size. 3D MIP and volume-rendered images were created at the   scanner.    COMPARISON: The study was compared to CT chest dated 2/23/2025    FINDINGS:  Lines and Tubes: There is a right-sided central venous catheter with the   tip terminating in distal right brachiocephalic vein.    Mediastinum: The thyroid and thoracic inlet are normal. There is no   enlarged axiliary, mediastinal, or hilar lymph nodes. The heart size is   within normal limits. There isno pericardial effusion. The aorta is   normal in course and caliber, with mild calcified atheromas. Lobar   pulmonary arteries have low attenuation, likely artifactual and due to   beam hardening. The esophagus is unremarkable.    Lung: The central tracheobronchial tree is patent. There is no focal   consolidation. There is mild peribronchial wall thickening most prominent   in the left lower lobe. There are mucoid impaction and airway associated   and subpleural groundglass opacities, more prominent in the left lower   lobe and to a lesser degree in right lower lobe..    Pleura: There is no pleural effusion or pneumothorax.    Abdomen: There is prominence of right renal pelvis. Cholelithiasis is   noted. There is no acute upper abdominal finding.    Bone and Soft Tissue: There is no evidence of osteosclerotic or   osteolytic lesions. There are multilevel degenerative changes of thoracic   spine , with disk space narrowing and osteophytosis. There is mild   subcutaneous edema in left lateral upper abdominal wall.    IMPRESSION:  Mucoid impaction and airway associated subpleural groundglass opacities,   most prominent in left lower lobe and to a lesser degree in right lower   lobe.  Mild bronchial wall thickening, likely inflammatory.    --- End of Report ---    < end of copied text >             Date of Service: 03-02-25 @ 09:35    Patient is a 74y old  Male who presents with a chief complaint of ESRD requiring HD, uremia (02 Mar 2025 08:23)      Any change in ROS:   Alert but slightly confused  Breathing nonlabored   O2 sats 92% RA     MEDICATIONS  (STANDING):  acetaminophen   IVPB .. 1000 milliGRAM(s) IV Intermittent once  albuterol/ipratropium for Nebulization 3 milliLiter(s) Nebulizer every 6 hours  atorvastatin 40 milliGRAM(s) Oral at bedtime  calcium acetate 1334 milliGRAM(s) Oral three times a day with meals  chlorhexidine 4% Liquid 1 Application(s) Topical <User Schedule>  epoetin aleah-epbx (RETACRIT) Injectable 66370 Unit(s) IV Push <User Schedule>  guaiFENesin ER 1200 milliGRAM(s) Oral every 12 hours  heparin  Infusion. 1300 Unit(s)/Hr (13 mL/Hr) IV Continuous <Continuous>  hydrOXYzine hydrochloride 25 milliGRAM(s) Oral three times a day  ibuprofen  Tablet. 400 milliGRAM(s) Oral every 12 hours  ketoconazole 2% Shampoo 1 Application(s) Topical <User Schedule>  oxyCODONE  ER Tablet 30 milliGRAM(s) Oral every 12 hours  pantoprazole    Tablet 40 milliGRAM(s) Oral before breakfast    MEDICATIONS  (PRN):  acetaminophen     Tablet .. 650 milliGRAM(s) Oral every 6 hours PRN Temp greater or equal to 38C (100.4F), Mild Pain (1 - 3)  heparin   Injectable 7500 Unit(s) IV Push every 6 hours PRN For aPTT less than 40  heparin   Injectable 3500 Unit(s) IV Push every 6 hours PRN For aPTT between 40 - 57  HYDROmorphone  Injectable 2 milliGRAM(s) IV Push every 6 hours PRN Severe Pain (7 - 10)  sodium chloride 0.9% lock flush 10 milliLiter(s) IV Push every 1 hour PRN Pre/post blood products, medications, blood draw, and to maintain line patency    Vital Signs Last 24 Hrs  T(C): 36.8 (02 Mar 2025 07:40), Max: 37.4 (01 Mar 2025 15:12)  T(F): 98.3 (02 Mar 2025 07:40), Max: 99.4 (01 Mar 2025 15:12)  HR: 84 (02 Mar 2025 07:40) (84 - 98)  BP: 108/66 (02 Mar 2025 07:40) (90/51 - 128/76)  BP(mean): --  RR: 18 (02 Mar 2025 07:40) (18 - 18)  SpO2: 90% (02 Mar 2025 07:40) (90% - 96%)    Parameters below as of 02 Mar 2025 07:40  Patient On (Oxygen Delivery Method): room air        I&O's Summary    01 Mar 2025 07:01  -  02 Mar 2025 07:00  --------------------------------------------------------  IN: 0 mL / OUT: 200 mL / NET: -200 mL          Physical Exam:   GENERAL: NAD, well-groomed, well-developed  HEENT: VINNY/   Atraumatic, Normocephalic  ENMT: No tonsillar erythema, exudates, or enlargement; Moist mucous membranes, Good dentition, No lesions  NECK: Supple, No JVD, Normal thyroid  CHEST/LUNG: grossly clear anteriorly   CVS: Regular rate and rhythm; No murmurs, rubs, or gallops  GI: : Soft, Nontender, Nondistended; Bowel sounds present  NERVOUS SYSTEM:  Alert & Oriented X2, confused at times   EXTREMITIES:  atrophic, mottled toes   LYMPH: No lymphadenopathy noted  SKIN: No rashes or lesions  ENDOCRINOLOGY: No Thyromegaly  PSYCH: Appropriate    Labs:  30                            8.9    16.50 )-----------( 220      ( 02 Mar 2025 07:28 )             29.5                         10.0   10.79 )-----------( 239      ( 01 Mar 2025 07:02 )             33.2                         8.8    9.29  )-----------( 178      ( 28 Feb 2025 09:56 )             29.3                         8.6    9.60  )-----------( 164      ( 27 Feb 2025 05:43 )             28.9                         8.7    8.81  )-----------( 168      ( 26 Feb 2025 21:22 )             28.3     03-01    131[L]  |  94[L]  |  33[H]  ----------------------------<  88  4.7   |  22  |  5.69[H]    Ca    8.0[L]      01 Mar 2025 07:01      CAPILLARY BLOOD GLUCOSE      POCT Blood Glucose.: 106 mg/dL (01 Mar 2025 19:04)        PTT - ( 01 Mar 2025 14:03 )  PTT:87.9 sec  Urinalysis Basic - ( 01 Mar 2025 07:01 )    Color: x / Appearance: x / SG: x / pH: x  Gluc: 88 mg/dL / Ketone: x  / Bili: x / Urobili: x   Blood: x / Protein: x / Nitrite: x   Leuk Esterase: x / RBC: x / WBC x   Sq Epi: x / Non Sq Epi: x / Bacteria: x        < from: CT Chest No Cont (02.23.25 @ 16:46) >    ACC: [id number] EXAM:  CT CHEST   ORDERED BY:  NELL TOLBERT     PROCEDURE DATE:  02/23/2025          INTERPRETATION:  CLINICAL INFORMATION: 74-year-old male with cough    TECHNIQUE: A volumetric CT acquisition of the chest was obtained from the   thoracic inlet to the upper abdomen, without administration of   intravenous contrast. This CT scan was performed with one or more of the   following dose optimization: iterative reconstruction, automatic exposure   control, and/or manual adjustmentof mAs and kVp according to the   patient's size. 3D MIP and volume-rendered images were created at the   scanner.    COMPARISON: The study was compared to CT chest dated 2/23/2025    FINDINGS:  Lines and Tubes: There is a right-sided central venous catheter with the   tip terminating in distal right brachiocephalic vein.    Mediastinum: The thyroid and thoracic inlet are normal. There is no   enlarged axiliary, mediastinal, or hilar lymph nodes. The heart size is   within normal limits. There isno pericardial effusion. The aorta is   normal in course and caliber, with mild calcified atheromas. Lobar   pulmonary arteries have low attenuation, likely artifactual and due to   beam hardening. The esophagus is unremarkable.    Lung: The central tracheobronchial tree is patent. There is no focal   consolidation. There is mild peribronchial wall thickening most prominent   in the left lower lobe. There are mucoid impaction and airway associated   and subpleural groundglass opacities, more prominent in the left lower   lobe and to a lesser degree in right lower lobe..    Pleura: There is no pleural effusion or pneumothorax.    Abdomen: There is prominence of right renal pelvis. Cholelithiasis is   noted. There is no acute upper abdominal finding.    Bone and Soft Tissue: There is no evidence of osteosclerotic or   osteolytic lesions. There are multilevel degenerative changes of thoracic   spine , with disk space narrowing and osteophytosis. There is mild   subcutaneous edema in left lateral upper abdominal wall.    IMPRESSION:  Mucoid impaction and airway associated subpleural groundglass opacities,   most prominent in left lower lobe and to a lesser degree in right lower   lobe.  Mild bronchial wall thickening, likely inflammatory.    --- End of Report ---    < end of copied text >    < from: Xray Chest 1 View- PORTABLE-Urgent (Xray Chest 1 View- PORTABLE-Urgent .) (03.01.25 @ 22:48) >    ACC: [id number] EXAM:  XR CHEST PORTABLE URGENT 1V   ORDERED BY: DELPHINE BARNETT     PROCEDURE DATE:  03/01/2025          INTERPRETATION:  EXAMINATION: XR CHEST URGENT    CLINICAL INDICATION: cough    TECHNIQUE: Single frontal, portable view of the chest was obtained.    COMPARISON: Chest x-ray 2/20/2025.    FINDINGS:  Right-sided tunneled hemodialysis catheter terminates in the SVC.  The heart is not accurately assessed in this AP projection.  The lungs are clear.  There is no pneumothorax or pleural effusion.  No acute bony abnormality.    IMPRESSION:  Clear lungs.    --- End of Report ---      < end of copied text >

## 2025-03-02 NOTE — PROGRESS NOTE ADULT - PROBLEM SELECTOR PLAN 3
-Neuro following  -ABG 2/20 acceptable  -Pt lethargic 2/22, ABG never sent but AMS appears to be improving   -ABG 2/28 with no co2 retention.

## 2025-03-03 ENCOUNTER — APPOINTMENT (OUTPATIENT)
Dept: VASCULAR SURGERY | Facility: HOSPITAL | Age: 75
End: 2025-03-03
Payer: MEDICARE

## 2025-03-03 PROCEDURE — 37221: CPT | Mod: 50

## 2025-03-03 PROCEDURE — 75625 CONTRAST EXAM ABDOMINL AORTA: CPT | Mod: 26

## 2025-03-03 PROCEDURE — 37223: CPT | Mod: 50

## 2025-03-03 NOTE — PROGRESS NOTE ADULT - PROBLEM SELECTOR PLAN 1
-CXR with elevated R hemidiaphragm (not present on CXR in December 2024)  -Low grade temp, cough  -CT chest with LLL, RLL GGO, mucoid impacted airways. No clear PNA. Monitoring off ABX per ID  -Suggest Duoneb q6h, Mucinex 1200 mg PO BID   -S/p Mucomyst x5 days   -Incentive spirometry   -Keep sats >90% with o2 PRN  -ABG 2/28 with no co2 retention  -CXR 3/1 with improved R hemidiaphragm, low lung volumes on L. Otherwise grossly clear.

## 2025-03-03 NOTE — PROGRESS NOTE ADULT - ASSESSMENT
74 year old male with CKD, sp polio, paraplegia with associated neurogenic bladder s/p suprapubic catheter who was admitted s/p fall on 2/16.   CKD - ESRD, now s/p DEYA [place] 2/17, on HD  ruled out cellulitis around suprapubic cath  2/20 s/p RRT for tremors and confusion -- pt with chronic tremors although notably worse  hyponatremia    labs sars cov2/rsv/flu neg  mrsa negative   2/20 CXR with clear lungs   temps wnl, pt asx, off antibiotics   SPC site with chronic/stable inflammatory changes, no drainage - no sign of SSTI  CTA abd with occlusion of b/l external iliac arteries and b/l superficial femoral arteries with distal reconstitution at popliteal arteries; patent three vessel runoff of RLE with 2 vessel runoff of LLE with occlusion of L mid anterior tibial artery with distal reconstitution  CTH with no acute findings   Bcx negative to date   mental status at baseline  WBC noted-downtrended  Vascular following for worsening PAD with worsening ischemic changes     s/p vancomycin x1 2/20  s/p zosyn 2/20-2/22      Recommendations:   Continue off antibiotics   Monitor temps/WBC  Vascular surgery following, plan for OR 3/3  HD per renal    Continue rest of care per primary team       Bridgette Ramirez M.D.  [facility]  Available on [employer] TE[employer] - *PREFERRED*  [phone]  After 5pm on weekdays and all day on weekends - please call [phone]     Thank you for consulting us and involving us in the management of this patients case. In addition to reviewing history, imaging, documents, labs, microbiology, took into account antibiotic stewardship, local antibiogram and infection control strategies and potential transmission issues at time of treatment decision making process.

## 2025-03-03 NOTE — PROGRESS NOTE ADULT - SUBJECTIVE AND OBJECTIVE BOX
SUBJECTIVE: NAEO. Patient planned for angiogram today.     Vital Signs Last 24 Hrs  T(C): 36.6 (03 Mar 2025 04:36), Max: 36.7 (02 Mar 2025 16:09)  T(F): 97.8 (03 Mar 2025 04:36), Max: 98 (02 Mar 2025 16:09)  HR: 69 (03 Mar 2025 04:36) (69 - 84)  BP: 147/71 (03 Mar 2025 04:36) (108/50 - 147/71)  BP(mean): --  RR: 18 (03 Mar 2025 04:36) (18 - 19)  SpO2: 98% (03 Mar 2025 04:36) (81% - 100%)    Parameters below as of 03 Mar 2025 04:36  Patient On (Oxygen Delivery Method): nasal cannula  O2 Flow (L/min): 3      I&O's Detail    02 Mar 2025 07:01  -  03 Mar 2025 07:00  --------------------------------------------------------  IN:    Heparin Infusion: 127 mL  Total IN: 127 mL    OUT:    Indwelling Catheter - Suprapubic (mL): 350 mL  Total OUT: 350 mL    Total NET: -223 mL        Physical Exam:  General: NAD  Neuro: Awake, alert and oriented x3  Resp: Nonlabored breathing on RA  CV: Hemodynamically stable.   GI/Abd: Soft, obese, nontender. Dressing to [facility] C/D/I. Suprapubic catheter in place  Vascular: Nonpalpable femoral pulses. Nonpalpable DP/PT b/l.   Extremities: BLE atrophic, minimal dorsi/plantarflexion bilaterally. Sensation diminished, LLE edematous compared to R, mottling of right toes/forefoot/heel, No active wounds bilat.    LABS:                        8.5    11.77 )-----------( 193      ( 03 Mar 2025 07:13 )             28.5     03-03    129[L]  |  92[L]  |  25[H]  ----------------------------<  161[H]  3.9   |  25  |  4.11[H]    Ca    8.0[L]      03 Mar 2025 01:12  Phos  2.1     03-03  Mg     2.1     03-03      PT/INR - ( 02 Mar 2025 13:53 )   PT: 20.8 sec;   INR: 1.84 ratio         PTT - ( 02 Mar 2025 20:44 )  PTT:>200.0 sec  Urinalysis Basic - ( 03 Mar 2025 01:12 )    Color: x / Appearance: x / SG: x / pH: x  Gluc: 161 mg/dL / Ketone: x  / Bili: x / Urobili: x   Blood: x / Protein: x / Nitrite: x   Leuk Esterase: x / RBC: x / WBC x   Sq Epi: x / Non Sq Epi: x / Bacteria: x        RADIOLOGY & ADDITIONAL STUDIES:

## 2025-03-03 NOTE — PROGRESS NOTE ADULT - SUBJECTIVE AND OBJECTIVE BOX
Subjective: Patient seen and examined. No new events except as noted.   s/p angiogram   Pt seen at PACU    REVIEW OF SYSTEMS:  unable to obtain    MEDICATIONS:  MEDICATIONS  (STANDING):  acetaminophen   IVPB .. 1000 milliGRAM(s) IV Intermittent once  albuterol/ipratropium for Nebulization 3 milliLiter(s) Nebulizer every 6 hours  aspirin  chewable 81 milliGRAM(s) Oral daily  atorvastatin 40 milliGRAM(s) Oral at bedtime  clopidogrel Tablet 300 milliGRAM(s) Oral once  epoetin aleah-epbx (RETACRIT) Injectable 74556 Unit(s) IV Push <User Schedule>  guaiFENesin ER 1200 milliGRAM(s) Oral every 12 hours  hydrOXYzine hydrochloride 25 milliGRAM(s) Oral three times a day  ibuprofen  Tablet. 400 milliGRAM(s) Oral every 12 hours  oxyCODONE  ER Tablet 30 milliGRAM(s) Oral every 12 hours  pantoprazole    Tablet 40 milliGRAM(s) Oral before breakfast      PHYSICAL EXAM:  T(C): 36 (03-03-25 @ 14:28), Max: 36.7 (03-02-25 @ 16:09)  HR: 77 (03-03-25 @ 14:45) (68 - 88)  BP: 104/57 (03-03-25 @ 14:45) (102/54 - 157/80)  RR: 17 (03-03-25 @ 14:45) (16 - 20)  SpO2: 100% (03-03-25 @ 14:45) (98% - 100%)  Wt(kg): --  I&O's Summary    02 Mar 2025 07:01  -  03 Mar 2025 07:00  --------------------------------------------------------  IN: 127 mL / OUT: 350 mL / NET: -223 mL      Height (cm): 172.7 (03-03 @ 10:32)  Weight (kg): 90.7 (03-03 @ 10:32)  BMI (kg/m2): 30.4 (03-03 @ 10:32)  BSA (m2): 2.04 (03-03 @ 10:32)    Appearance: NAD  HEENT:  Dry oral mucosa, PERRL, EOMI	  Lymphatic: No lymphadenopathy  Cardiovascular: Normal S1 S2, No JVD, No murmurs, No edema  Respiratory: Lungs clear to auscultation	  Psychiatry: A & O x 3, Mood & affect appropriate  Skin: No rashes, No ecchymoses, No cyanosis	  Neurologic: Non-focal  GI/Abd: Soft, obese, nontender. Dressing to [facility] C/D/I. Suprapubic catheter in place  Ext: Palp femoral pulses bilat. BLE atrophic, minimal dorsi/plantarflexion bilaterally. Sensation grossly intact. LLE edematous compared to R, erythema to right toes/forefoot. No active wounds bilat.   LLE:  small superficial abrasion, +edema and +ecchymosis over L knee  +TTP over L knee, no TTP along remainder of extremity; compartments soft  Limited ROM at knee 2/2 pain  No gross varus/valgus laxity, but assessment limited 2/2 pain  Motor: TA/EHL/GS/FHL intact  Sensory: DP/SP/Tib/Jordan/Saph SILT  +DP pulse (symmetric relative to contralateral side), WWP  Vascular: Peripheral pulses palpable 2+ bilaterally            LABS:    CARDIAC MARKERS:                                8.5    11.77 )-----------( 193      ( 03 Mar 2025 07:13 )             28.5     03-03    129[L]  |  92[L]  |  25[H]  ----------------------------<  161[H]  3.9   |  25  |  4.11[H]    Ca    8.0[L]      03 Mar 2025 01:12  Phos  2.1     03-03  Mg     2.1     03-03      proBNP:   Lipid Profile:   HgA1c:   TSH:             TELEMETRY: 	    ECG:  	  RADIOLOGY:   DIAGNOSTIC TESTING:  [ ] Echocardiogram:  [ ]  Catheterization:  [ ] Stress Test:    OTHER:

## 2025-03-03 NOTE — PROGRESS NOTE ADULT - SUBJECTIVE AND OBJECTIVE BOX
Date of Service: 03-03-25 @ 10:30    Patient is a 74y old  Male who presents with a chief complaint of ESRD requiring HD, uremia (03 Mar 2025 10:11)      Any change in ROS:   No new respiratory events overnight. Denies SOB/CP.      MEDICATIONS  (STANDING):  acetaminophen   IVPB .. 1000 milliGRAM(s) IV Intermittent once  albuterol/ipratropium for Nebulization 3 milliLiter(s) Nebulizer every 6 hours  atorvastatin 40 milliGRAM(s) Oral at bedtime  chlorhexidine 4% Liquid 1 Application(s) Topical <User Schedule>  epoetin aleah-epbx (RETACRIT) Injectable 78164 Unit(s) IV Push <User Schedule>  guaiFENesin ER 1200 milliGRAM(s) Oral every 12 hours  heparin  Infusion. 1200 Unit(s)/Hr (12 mL/Hr) IV Continuous <Continuous>  hydrOXYzine hydrochloride 25 milliGRAM(s) Oral three times a day  ibuprofen  Tablet. 400 milliGRAM(s) Oral every 12 hours  ketoconazole 2% Shampoo 1 Application(s) Topical <User Schedule>  oxyCODONE  ER Tablet 30 milliGRAM(s) Oral every 12 hours  pantoprazole    Tablet 40 milliGRAM(s) Oral before breakfast    MEDICATIONS  (PRN):  acetaminophen     Tablet .. 650 milliGRAM(s) Oral every 6 hours PRN Temp greater or equal to 38C (100.4F), Mild Pain (1 - 3)  heparin   Injectable 7500 Unit(s) IV Push every 6 hours PRN For aPTT less than 40  heparin   Injectable 3500 Unit(s) IV Push every 6 hours PRN For aPTT between 40 - 57  HYDROmorphone  Injectable 2 milliGRAM(s) IV Push every 6 hours PRN Severe Pain (7 - 10)  oxyCODONE    IR 10 milliGRAM(s) Oral every 6 hours PRN Moderate Pain (4 - 6)  sodium chloride 0.9% lock flush 10 milliLiter(s) IV Push every 1 hour PRN Pre/post blood products, medications, blood draw, and to maintain line patency    Vital Signs Last 24 Hrs  T(C): 36.5 (03 Mar 2025 10:10), Max: 36.7 (02 Mar 2025 16:09)  T(F): 97.7 (03 Mar 2025 08:19), Max: 98 (02 Mar 2025 16:09)  HR: 68 (03 Mar 2025 10:10) (68 - 84)  BP: 102/64 (03 Mar 2025 10:10) (102/64 - 147/71)  BP(mean): 98 (03 Mar 2025 10:10) (98 - 98)  RR: 18 (03 Mar 2025 10:10) (18 - 19)  SpO2: 100% (03 Mar 2025 10:10) (81% - 100%)    Parameters below as of 03 Mar 2025 10:10    O2 Flow (L/min): 3      I&O's Summary    02 Mar 2025 07:01  -  03 Mar 2025 07:00  --------------------------------------------------------  IN: 127 mL / OUT: 350 mL / NET: -223 mL          Physical Exam:   GENERAL: NAD, well-groomed, well-developed  HEENT: VINNY/   Atraumatic, Normocephalic  ENMT: No tonsillar erythema, exudates, or enlargement; Moist mucous membranes, Good dentition, No lesions  NECK: Supple, No JVD, Normal thyroid  CHEST/LUNG: Decreased at bases   CVS: Regular rate and rhythm; No murmurs, rubs, or gallops  GI: : Soft, Nontender, Nondistended; Bowel sounds present  NERVOUS SYSTEM:  Alert & Oriented X2-3  EXTREMITIES:  atrophic, mottled toes   LYMPH: No lymphadenopathy noted  SKIN: No rashes or lesions  ENDOCRINOLOGY: No Thyromegaly  PSYCH: Appropriate    Labs:  30                            8.5    11.77 )-----------( 193      ( 03 Mar 2025 07:13 )             28.5                         7.9    12.22 )-----------( 189      ( 03 Mar 2025 01:12 )             26.3                         8.9    16.50 )-----------( 220      ( 02 Mar 2025 07:28 )             29.5                         10.0   10.79 )-----------( 239      ( 01 Mar 2025 07:02 )             33.2                         8.8    9.29  )-----------( 178      ( 28 Feb 2025 09:56 )             29.3     03-03    129[L]  |  92[L]  |  25[H]  ----------------------------<  161[H]  3.9   |  25  |  4.11[H]  03-01    131[L]  |  94[L]  |  33[H]  ----------------------------<  88  4.7   |  22  |  5.69[H]    Ca    8.0[L]      03 Mar 2025 01:12  Phos  2.1     03-03  Mg     2.1     03-03          PT/INR - ( 02 Mar 2025 13:53 )   PT: 20.8 sec;   INR: 1.84 ratio         PTT - ( 03 Mar 2025 07:13 )  PTT:102.6 sec  Urinalysis Basic - ( 03 Mar 2025 01:12 )    Color: x / Appearance: x / SG: x / pH: x  Gluc: 161 mg/dL / Ketone: x  / Bili: x / Urobili: x   Blood: x / Protein: x / Nitrite: x   Leuk Esterase: x / RBC: x / WBC x   Sq Epi: x / Non Sq Epi: x / Bacteria: x          Studies  < from: Xray Chest 1 View- PORTABLE-Urgent (Xray Chest 1 View- PORTABLE-Urgent .) (03.01.25 @ 22:48) >    ACC: [id number] EXAM:  XR CHEST PORTABLE URGENT 1V   ORDERED BY: DELPHINE BARNETT     PROCEDURE DATE:  03/01/2025          INTERPRETATION:  EXAMINATION: XR CHEST URGENT    CLINICAL INDICATION: cough    TECHNIQUE: Single frontal, portable view of the chest was obtained.    COMPARISON: Chest x-ray 2/20/2025.    FINDINGS:  Right-sided tunneled hemodialysis catheter terminates in the SVC.  The heart is not accurately assessed in this AP projection.  The lungs are clear.  There is no pneumothorax or pleural effusion.  No acute bony abnormality.    IMPRESSION:  Clear lungs.    --- End of Report ---        < end of copied text >    < from: CT Chest No Cont (02.23.25 @ 16:46) >    ACC: [id number] EXAM:  CT CHEST   ORDERED BY:  NELL VON TOLBERT     PROCEDURE DATE:  02/23/2025          INTERPRETATION:  CLINICAL INFORMATION: 74-year-old male with cough    TECHNIQUE: A volumetric CT acquisition of the chest was obtained from the   thoracic inlet to the upper abdomen, without administration of   intravenous contrast. This CT scan was performed with one or more of the   following dose optimization: iterative reconstruction, automatic exposure   control, and/or manual adjustmentof mAs and kVp according to the   patient's size. 3D MIP and volume-rendered images were created at the   scanner.    COMPARISON: The study was compared to CT chest dated 2/23/2025    FINDINGS:  Lines and Tubes: There is a right-sided central venous catheter with the   tip terminating in distal right brachiocephalic vein.    Mediastinum: The thyroid and thoracic inlet are normal. There is no   enlarged axiliary, mediastinal, or hilar lymph nodes. The heart size is   within normal limits. There isno pericardial effusion. The aorta is   normal in course and caliber, with mild calcified atheromas. Lobar   pulmonary arteries have low attenuation, likely artifactual and due to   beam hardening. The esophagus is unremarkable.    Lung: The central tracheobronchial tree is patent. There is no focal   consolidation. There is mild peribronchial wall thickening most prominent   in the left lower lobe. There are mucoid impaction and airway associated   and subpleural groundglass opacities, more prominent in the left lower   lobe and to a lesser degree in right lower lobe..    Pleura: There is no pleural effusion or pneumothorax.    Abdomen: There is prominence of right renal pelvis. Cholelithiasis is   noted. There is no acute upper abdominal finding.    Bone and Soft Tissue: There is no evidence of osteosclerotic or   osteolytic lesions. There are multilevel degenerative changes of thoracic   spine , with disk space narrowing and osteophytosis. There is mild   subcutaneous edema in left lateral upper abdominal wall.    IMPRESSION:  Mucoid impaction and airway associated subpleural groundglass opacities,   most prominent in left lower lobe and to a lesser degree in right lower   lobe.  Mild bronchial wall thickening, likely inflammatory.    --- End of Report ---      < end of copied text >             Date of Service: 03-03-25 @ 10:30    Patient is a 74y old  Male who presents with a chief complaint of ESRD requiring HD, uremia (03 Mar 2025 10:11)      Any change in ROS:   No new respiratory events overnight. Denies SOB/CP.      MEDICATIONS  (STANDING):  acetaminophen   IVPB .. 1000 milliGRAM(s) IV Intermittent once  albuterol/ipratropium for Nebulization 3 milliLiter(s) Nebulizer every 6 hours  atorvastatin 40 milliGRAM(s) Oral at bedtime  chlorhexidine 4% Liquid 1 Application(s) Topical <User Schedule>  epoetin aleah-epbx (RETACRIT) Injectable 32066 Unit(s) IV Push <User Schedule>  guaiFENesin ER 1200 milliGRAM(s) Oral every 12 hours  heparin  Infusion. 1200 Unit(s)/Hr (12 mL/Hr) IV Continuous <Continuous>  hydrOXYzine hydrochloride 25 milliGRAM(s) Oral three times a day  ibuprofen  Tablet. 400 milliGRAM(s) Oral every 12 hours  ketoconazole 2% Shampoo 1 Application(s) Topical <User Schedule>  oxyCODONE  ER Tablet 30 milliGRAM(s) Oral every 12 hours  pantoprazole    Tablet 40 milliGRAM(s) Oral before breakfast    MEDICATIONS  (PRN):  acetaminophen     Tablet .. 650 milliGRAM(s) Oral every 6 hours PRN Temp greater or equal to 38C (100.4F), Mild Pain (1 - 3)  heparin   Injectable 7500 Unit(s) IV Push every 6 hours PRN For aPTT less than 40  heparin   Injectable 3500 Unit(s) IV Push every 6 hours PRN For aPTT between 40 - 57  HYDROmorphone  Injectable 2 milliGRAM(s) IV Push every 6 hours PRN Severe Pain (7 - 10)  oxyCODONE    IR 10 milliGRAM(s) Oral every 6 hours PRN Moderate Pain (4 - 6)  sodium chloride 0.9% lock flush 10 milliLiter(s) IV Push every 1 hour PRN Pre/post blood products, medications, blood draw, and to maintain line patency    Vital Signs Last 24 Hrs  T(C): 36.5 (03 Mar 2025 10:10), Max: 36.7 (02 Mar 2025 16:09)  T(F): 97.7 (03 Mar 2025 08:19), Max: 98 (02 Mar 2025 16:09)  HR: 68 (03 Mar 2025 10:10) (68 - 84)  BP: 102/64 (03 Mar 2025 10:10) (102/64 - 147/71)  BP(mean): 98 (03 Mar 2025 10:10) (98 - 98)  RR: 18 (03 Mar 2025 10:10) (18 - 19)  SpO2: 100% (03 Mar 2025 10:10) (81% - 100%)    Parameters below as of 03 Mar 2025 10:10    O2 Flow (L/min): 3      I&O's Summary    02 Mar 2025 07:01  -  03 Mar 2025 07:00  --------------------------------------------------------  IN: 127 mL / OUT: 350 mL / NET: -223 mL          Physical Exam:   GENERAL: NAD, well-groomed, well-developed  HEENT: VINNY/   Atraumatic, Normocephalic  ENMT: No tonsillar erythema, exudates, or enlargement; Moist mucous membranes, Good dentition, No lesions  NECK: Supple, No JVD, Normal thyroid  CHEST/LUNG: Decreased at bases   CVS: Regular rate and rhythm; No murmurs, rubs, or gallops  GI: : Soft, Nontender, Nondistended; Bowel sounds present  NERVOUS SYSTEM:  Alert & Oriented X2-3  EXTREMITIES:  atrophic, mottled toes   LYMPH: No lymphadenopathy noted  SKIN: No rashes or lesions  ENDOCRINOLOGY: No Thyromegaly  PSYCH: Appropriate    Labs:  30                            8.5    11.77 )-----------( 193      ( 03 Mar 2025 07:13 )             28.5                         7.9    12.22 )-----------( 189      ( 03 Mar 2025 01:12 )             26.3                         8.9    16.50 )-----------( 220      ( 02 Mar 2025 07:28 )             29.5                         10.0   10.79 )-----------( 239      ( 01 Mar 2025 07:02 )             33.2                         8.8    9.29  )-----------( 178      ( 28 Feb 2025 09:56 )             29.3     03-03    129[L]  |  92[L]  |  25[H]  ----------------------------<  161[H]  3.9   |  25  |  4.11[H]  03-01    131[L]  |  94[L]  |  33[H]  ----------------------------<  88  4.7   |  22  |  5.69[H]    Ca    8.0[L]      03 Mar 2025 01:12  Phos  2.1     03-03  Mg     2.1     03-03          PT/INR - ( 02 Mar 2025 13:53 )   PT: 20.8 sec;   INR: 1.84 ratio         PTT - ( 03 Mar 2025 07:13 )  PTT:102.6 sec  Urinalysis Basic - ( 03 Mar 2025 01:12 )    Color: x / Appearance: x / SG: x / pH: x  Gluc: 161 mg/dL / Ketone: x  / Bili: x / Urobili: x   Blood: x / Protein: x / Nitrite: x   Leuk Esterase: x / RBC: x / WBC x   Sq Epi: x / Non Sq Epi: x / Bacteria: x          Studies  < from: Xray Chest 1 View- PORTABLE-Urgent (Xray Chest 1 View- PORTABLE-Urgent .) (03.01.25 @ 22:48) >    ACC: [id number] EXAM:  XR CHEST PORTABLE URGENT 1V   ORDERED BY: DELPHINE BARNETT     PROCEDURE DATE:  03/01/2025          INTERPRETATION:  EXAMINATION: XR CHEST URGENT    CLINICAL INDICATION: cough    TECHNIQUE: Single frontal, portable view of the chest was obtained.    COMPARISON: Chest x-ray 2/20/2025.    FINDINGS:  Right-sided tunneled hemodialysis catheter terminates in the SVC.  The heart is not accurately assessed in this AP projection.  The lungs are clear.  There is no pneumothorax or pleural effusion.  No acute bony abnormality.    IMPRESSION:  Clear lungs.    --- End of Report ---        < end of copied text >    < from: CT Chest No Cont (02.23.25 @ 16:46) >    ACC: [id number] EXAM:  CT CHEST   ORDERED BY:  NELL VON TOLBERT     PROCEDURE DATE:  02/23/2025          INTERPRETATION:  CLINICAL INFORMATION: 74-year-old male with cough    TECHNIQUE: A volumetric CT acquisition of the chest was obtained from the   thoracic inlet to the upper abdomen, without administration of   intravenous contrast. This CT scan was performed with one or more of the   following dose optimization: iterative reconstruction, automatic exposure   control, and/or manual adjustmentof mAs and kVp according to the   patient's size. 3D MIP and volume-rendered images were created at the   scanner.    COMPARISON: The study was compared to CT chest dated 2/23/2025    FINDINGS:  Lines and Tubes: There is a right-sided central venous catheter with the   tip terminating in distal right brachiocephalic vein.    Mediastinum: The thyroid and thoracic inlet are normal. There is no   enlarged axiliary, mediastinal, or hilar lymph nodes. The heart size is   within normal limits. There isno pericardial effusion. The aorta is   normal in course and caliber, with mild calcified atheromas. Lobar   pulmonary arteries have low attenuation, likely artifactual and due to   beam hardening. The esophagus is unremarkable.    Lung: The central tracheobronchial tree is patent. There is no focal   consolidation. There is mild peribronchial wall thickening most prominent   in the left lower lobe. There are mucoid impaction and airway associated   and subpleural groundglass opacities, more prominent in the left lower   lobe and to a lesser degree in right lower lobe..    Pleura: There is no pleural effusion or pneumothorax.    Abdomen: There is prominence of right renal pelvis. Cholelithiasis is   noted. There is no acute upper abdominal finding.    Bone and Soft Tissue: There is no evidence of osteosclerotic or   osteolytic lesions. There are multilevel degenerative changes of thoracic   spine , with disk space narrowing and osteophytosis. There is mild   subcutaneous edema in left lateral upper abdominal wall.    IMPRESSION:  Mucoid impaction and airway associated subpleural groundglass opacities,   most prominent in left lower lobe and to a lesser degree in right lower   lobe.  Mild bronchial wall thickening, likely inflammatory.    --- End of Report ---      < end of copied text >      rad< from: Xray Chest 1 View- PORTABLE-Urgent (Xray Chest 1 View- PORTABLE-Urgent .) (03.01.25 @ 22:48) >    FINDINGS:  Right-sided tunneled hemodialysis catheter terminates in the SVC.  The heart is not accurately assessed in this AP projection.  The lungs are clear.  There is no pneumothorax or pleural effusion.  No acute bony abnormality.    IMPRESSION:  Clear lungs.    --- End of Report ---          GILBERT LEON MD; Resident Radiologist  This document has been electronically signed.   JESUS HALEY DO; Attending Interventional Radiologist  This document has been electronically signed. Mar  2 2025  9:37AM    < end of copied text >  < from: CT Chest No Cont (02.23.25 @ 16:46) >  osteolytic lesions. There are multilevel degenerative changes of thoracic   spine , with disk space narrowing and osteophytosis. There is mild   subcutaneous edema in left lateral upper abdominal wall.    IMPRESSION:  Mucoid impaction and airway associated subpleural groundglass opacities,   most prominent in left lower lobe and to a lesser degree in right lower   lobe.  Mild bronchial wall thickening, likely inflammatory.    --- End of Report ---            TREVON YOUNG MD; Attending Radiologist  This document has been electronically signed. Feb 23 2025  6:22PM    < end of copied text >

## 2025-03-03 NOTE — PROGRESS NOTE ADULT - SUBJECTIVE AND OBJECTIVE BOX
[facility]  [facility] Griffin Y. Patel, S. Shah, G. Casimir  [phone]  ([phone] - weekdays after 5pm and weekends)    Name: BRIAN DEMPSEY  Age/Gender: 74y Male  MRN: [id number]    Interval History:  Patient seen and examined this morning.   Resting comfortably.   Notes reviewed  No concerning overnight events  Afebrile   Allergies: No Known Allergies      Objective:  Vitals:   T(F): 97.7 (03-03-25 @ 08:19), Max: 98 (03-02-25 @ 16:09)  HR: 68 (03-03-25 @ 08:19) (68 - 84)  BP: 102/64 (03-03-25 @ 08:19) (102/64 - 147/71)  RR: 18 (03-03-25 @ 08:19) (18 - 19)  SpO2: 100% (03-03-25 @ 08:19) (81% - 100%)  Physical Examination:  General: no acute distress, ill appearing   HEENT: normocephalic, atraumatic, anicteric  Respiratory: no acc muscle use, breathing comfortably  Cardiovascular: S1 and S2 present  Gastrointestinal: normal appearing, nondistended  Extremities: LLE brace, no edema    Laboratory Studies:  CBC:                       8.5    11.77 )-----------( 193      ( 03 Mar 2025 07:13 )             28.5     WBC Trend:  11.77 03-03-25 @ 07:13  12.22 03-03-25 @ 01:12  16.50 03-02-25 @ 07:28  10.79 03-01-25 @ 07:02  9.29 02-28-25 @ 09:56  9.60 02-27-25 @ 05:43  8.81 02-26-25 @ 21:22  8.98 02-26-25 @ 07:30  6.92 02-25-25 @ 10:12    CMP: 03-03    129[L]  |  92[L]  |  25[H]  ----------------------------<  161[H]  3.9   |  25  |  4.11[H]    Ca    8.0[L]      03 Mar 2025 01:12  Phos  2.1     03-03  Mg     2.1     03-03    Creatinine: 4.11 mg/dL (03-03-25 @ 01:12)  Creatinine: 5.69 mg/dL (03-01-25 @ 07:01)  Creatinine: 5.26 mg/dL (02-26-25 @ 07:30)  Creatinine: 4.28 mg/dL (02-25-25 @ 10:12)    Microbiology: reviewed   Culture - Blood (collected 02-20-25 @ 01:55)  Source: .Blood Blood-Peripheral  Final Report (02-25-25 @ 04:00):    No growth at 5 days    Radiology: reviewed     Medications:  acetaminophen     Tablet .. 650 milliGRAM(s) Oral every 6 hours PRN  acetaminophen   IVPB .. 1000 milliGRAM(s) IV Intermittent once  albuterol/ipratropium for Nebulization 3 milliLiter(s) Nebulizer every 6 hours  atorvastatin 40 milliGRAM(s) Oral at bedtime  chlorhexidine 4% Liquid 1 Application(s) Topical <User Schedule>  epoetin aleah-epbx (RETACRIT) Injectable 79623 Unit(s) IV Push <User Schedule>  guaiFENesin ER 1200 milliGRAM(s) Oral every 12 hours  heparin   Injectable 7500 Unit(s) IV Push every 6 hours PRN  heparin   Injectable 3500 Unit(s) IV Push every 6 hours PRN  heparin  Infusion. 1200 Unit(s)/Hr IV Continuous <Continuous>  HYDROmorphone  Injectable 2 milliGRAM(s) IV Push every 6 hours PRN  hydrOXYzine hydrochloride 25 milliGRAM(s) Oral three times a day  ibuprofen  Tablet. 400 milliGRAM(s) Oral every 12 hours  ketoconazole 2% Shampoo 1 Application(s) Topical <User Schedule>  oxyCODONE    IR 10 milliGRAM(s) Oral every 6 hours PRN  oxyCODONE  ER Tablet 30 milliGRAM(s) Oral every 12 hours  pantoprazole    Tablet 40 milliGRAM(s) Oral before breakfast  sodium chloride 0.9% lock flush 10 milliLiter(s) IV Push every 1 hour PRN    Prior/Completed Antimicrobials:  piperacillin/tazobactam IVPB.  piperacillin/tazobactam IVPB.-  vancomycin  IVPB

## 2025-03-03 NOTE — PROGRESS NOTE ADULT - SUBJECTIVE AND OBJECTIVE BOX
Overnight events noted      VITAL:  T(C): , Max: 36.7 (03-02-25 @ 16:09)  T(F): , Max: 98 (03-02-25 @ 16:09)  HR: 68 (03-03-25 @ 08:19)  BP: 102/64 (03-03-25 @ 08:19)  BP(mean): --  RR: 18 (03-03-25 @ 08:19)  SpO2: 100% (03-03-25 @ 08:19)  Wt(kg): --      PHYSICAL EXAM:  Constitutional: alert, NAD  HEENT: NCAT, DMM  Neck: Supple, No JVD  Respiratory: CTA-b/l  Cardiovascular: RRR s1s2, no m/r/g  Gastrointestinal: BS+, soft, NT/ND  Extremities: No peripheral edema b/l  Neurological: reduced generalized strength  Back: no CVAT b/l  Skin: (+)violaceous changes 1st/2nd toes of R foot  Access: RIJ tunneled cath    LABS:                        8.5    11.77 )-----------( 193      ( 03 Mar 2025 07:13 )             28.5     Na(129)/K(3.9)/Cl(92)/HCO3(25)/BUN(25)/Cr(4.11)Glu(161)/Ca(8.0)/Mg(2.1)/PO4(2.1)    03-03 @ 01:12  Na(131)/K(4.7)/Cl(94)/HCO3(22)/BUN(33)/Cr(5.69)Glu(88)/Ca(8.0)/Mg(--)/PO4(--)    03-01 @ 07:01        IMPRESSION: 74M w/ polio, neurogenic bladder/suprapubic catheter, 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 I just spoke to the Vascular team; they ask he can get HD on Saturday to optimize for OR on Monday. We can plan to forgo HD today and instead dialyze tomorrow.    (2)Hyponatremia - quite mild/not a contraindication to moving forward with OR in my opinion    (3)Anemia - s/p IV iron; on Retacrit with HD    (4)Ortho - nondisplaced acute on chronic patellar fracture - Ortho input appreciated - recommending conservative therapy as inpatient    (5)PAD - planned for LE angio, potential stent, and potential bypass today          RECOMMEND:  (1)Can give 1.5%NS 50cc/h x 5h for hyponatremia  (2)No objection to vascular surgery today as planned  (3)HD post-op today              Rafita Denny MD  [facility]  Office/on call physician: [phone]  Cell (7a-7p): [phone]       Overnight events noted      VITAL:  T(C): , Max: 36.7 (03-02-25 @ 16:09)  T(F): , Max: 98 (03-02-25 @ 16:09)  HR: 68 (03-03-25 @ 08:19)  BP: 102/64 (03-03-25 @ 08:19)  BP(mean): --  RR: 18 (03-03-25 @ 08:19)  SpO2: 100% (03-03-25 @ 08:19)  Wt(kg): --      PHYSICAL EXAM:  Constitutional: alert, NAD  HEENT: NCAT, DMM  Neck: Supple, No JVD  Respiratory: CTA-b/l  Cardiovascular: RRR s1s2, no m/r/g  Gastrointestinal: BS+, soft, NT/ND  Extremities: No peripheral edema b/l  Neurological: reduced generalized strength  Back: no CVAT b/l  Skin: (+)violaceous changes 1st/2nd toes of R foot  Access: RIJ tunneled cath    LABS:                        8.5    11.77 )-----------( 193      ( 03 Mar 2025 07:13 )             28.5     Na(129)/K(3.9)/Cl(92)/HCO3(25)/BUN(25)/Cr(4.11)Glu(161)/Ca(8.0)/Mg(2.1)/PO4(2.1)    03-03 @ 01:12  Na(131)/K(4.7)/Cl(94)/HCO3(22)/BUN(33)/Cr(5.69)Glu(88)/Ca(8.0)/Mg(--)/PO4(--)    03-01 @ 07:01        IMPRESSION: 74M w/ polio, neurogenic bladder/suprapubic catheter, 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 I just spoke to the Vascular team; they ask he can get HD on Saturday to optimize for OR on Monday. We can plan to forgo HD today and instead dialyze tomorrow.    (2)Hyponatremia - quite mild/not a contraindication to moving forward with OR in my opinion. That being said, we can give 1.5%NS to drive up the sodium    (3)Hypophosphatemia - ordered for IV sodium phosphate - we can d/c the Phoslo for now    (4)Anemia - s/p IV iron; on Retacrit with HD    (5)Ortho - nondisplaced acute on chronic patellar fracture - Ortho input appreciated - recommending conservative therapy as inpatient    (6)PAD - planned for LE angio, potential stent, and potential bypass today          RECOMMEND:  (1)Can give 1.5%NS 50cc/h x 5h for hyponatremia  (2)Sodium phosphate IV as ordered  (3)D/C Phoslo  (4)No objection to vascular surgery today as planned  (5)HD post-op today              Rafita Denny MD  [facility]  Office/on call physician: [phone]  Cell (7a-7p): [phone]       Wife at bedside  no complaints      VITAL:  T(C): , Max: 36.7 (03-02-25 @ 16:09)  T(F): , Max: 98 (03-02-25 @ 16:09)  HR: 68 (03-03-25 @ 08:19)  BP: 102/64 (03-03-25 @ 08:19)  BP(mean): --  RR: 18 (03-03-25 @ 08:19)  SpO2: 100% (03-03-25 @ 08:19)  Wt(kg): --      PHYSICAL EXAM:  Constitutional: lethargic, NAD  HEENT: NCAT, DMM  Neck: Supple, No JVD  Respiratory: CTA-b/l  Cardiovascular: RRR s1s2, no m/r/g  Gastrointestinal: BS+, soft, NT/ND  Extremities: No peripheral edema b/l  Neurological: reduced generalized strength  Back: no CVAT b/l  Skin: (+)violaceous changes 1st/2nd toes of R foot  Access: RIJ tunneled cath    LABS:                        8.5    11.77 )-----------( 193      ( 03 Mar 2025 07:13 )             28.5     Na(129)/K(3.9)/Cl(92)/HCO3(25)/BUN(25)/Cr(4.11)Glu(161)/Ca(8.0)/Mg(2.1)/PO4(2.1)    03-03 @ 01:12  Na(131)/K(4.7)/Cl(94)/HCO3(22)/BUN(33)/Cr(5.69)Glu(88)/Ca(8.0)/Mg(--)/PO4(--)    03-01 @ 07:01        IMPRESSION: 74M w/ polio, neurogenic bladder/suprapubic catheter, 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 I just spoke to the Vascular team; they ask he can get HD on Saturday to optimize for OR on Monday. We can plan to forgo HD today and instead dialyze tomorrow.    (2)Hyponatremia - quite mild/not a contraindication to moving forward with OR in my opinion. That being said, we can give 1.5%NS to drive up the sodium    (3)Hypophosphatemia - ordered for IV sodium phosphate - we can d/c the Phoslo for now    (4)Anemia - s/p IV iron; on Retacrit with HD    (5)Ortho - nondisplaced acute on chronic patellar fracture - Ortho input appreciated - recommending conservative therapy as inpatient    (6)PAD - planned for LE angio, potential stent, and potential bypass today          RECOMMEND:  (1)Can give 1.5%NS 50cc/h x 5h for hyponatremia  (2)Sodium phosphate IV as ordered  (3)D/C Phoslo  (4)No objection to vascular surgery today as planned  (5)HD post-op today              Rafita Denny MD  [facility]  Office/on call physician: [phone]  Cell (7a-7p): [phone]       Wife at bedside  no complaints      VITAL:  T(C): , Max: 36.7 (03-02-25 @ 16:09)  T(F): , Max: 98 (03-02-25 @ 16:09)  HR: 68 (03-03-25 @ 08:19)  BP: 102/64 (03-03-25 @ 08:19)  BP(mean): --  RR: 18 (03-03-25 @ 08:19)  SpO2: 100% (03-03-25 @ 08:19)  Wt(kg): --      PHYSICAL EXAM:  Constitutional: lethargic, NAD  HEENT: NCAT, DMM  Neck: Supple, No JVD  Respiratory: CTA-b/l  Cardiovascular: RRR s1s2, no m/r/g  Gastrointestinal: BS+, soft, NT/ND  Extremities: No peripheral edema b/l  Neurological: reduced generalized strength  Back: no CVAT b/l  Skin: (+)violaceous changes 1st/2nd toes of R foot  Access: RIJ tunneled cath    LABS:                        8.5    11.77 )-----------( 193      ( 03 Mar 2025 07:13 )             28.5     Na(129)/K(3.9)/Cl(92)/HCO3(25)/BUN(25)/Cr(4.11)Glu(161)/Ca(8.0)/Mg(2.1)/PO4(2.1)    03-03 @ 01:12  Na(131)/K(4.7)/Cl(94)/HCO3(22)/BUN(33)/Cr(5.69)Glu(88)/Ca(8.0)/Mg(--)/PO4(--)    03-01 @ 07:01        IMPRESSION: 74M w/ polio, neurogenic bladder/suprapubic catheter, 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 I just spoke to the Vascular team; they ask he can get HD on Saturday to optimize for OR on Monday. We can plan to forgo HD today and instead dialyze tomorrow.    (2)Hyponatremia - quite mild/not a contraindication to moving forward with OR in my opinion.     (3)Hypophosphatemia - ordered for IV sodium phosphate - we can d/c the Phoslo for now    (4)Anemia - s/p IV iron; on Retacrit with HD    (5)Ortho - nondisplaced acute on chronic patellar fracture - Ortho input appreciated - recommending conservative therapy as inpatient    (6)PAD - planned for LE angio, potential stent, and potential bypass today          RECOMMEND:  (1)No Nephrology objection to OR this a.m.; if (and only if) OR is planned for late today, then we could give 1.5%NS 50cc/h x 5h  (2)D/C Phoslo  (3)HD post-op today    Discussed with Vascular Surgery Dr. Messi Denny MD  [facility]  Office/on call physician: [phone]  Cell (7a-7p): [phone]

## 2025-03-03 NOTE — PROGRESS NOTE ADULT - ASSESSMENT
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 He is well known to me from multiple recent admisions  s/p admissions at [facility] 12/20-12/25/24 and 2/4-2/7 with SONDRA on CKD with associated uremic symptoms.   At the last admission he had expressed strong interest to try to hold off with HD intiation but he has been getting worse clinically at home.  His SONDRA and uremic symptoms significantly improved after the first admission; they improved a to mild extent during the 2nd admission to the point where he was able to be discharged without HD. Since then, however, he has worsened further.   He has been suffering from progressively worsening diffuse pruritus, loss of appetite, and generalized weakness and falls.   His nephrologist and PCP has been speaking with him and his family over the past few days,   The initial plan was that he would present to the [facility] ER Monday 2/17 (ie tomorrow) for HD initiation. Today, however, he fell and sustained trauma to his knee. Given the fall and concern for knee fracture, he presented to the ER today. multiple xrays show no Fractures.      CKD5, uremia, requiring initiation of HD  Rash, seborrheic dermatitis  Pruritis  Anemia  PAD  SP catheter, chronic  Left inferior pole acute on chronic patella Fx    plan  - HD via RIJ permacath  - 3/3: ptn is s/p angiogram RLE : b/l iliac arteries w successful angioplasty and stents. in PACU. awaitng HD.  ptn has a pressure wound from the LLE immobilizer posteriorly proximal to the knee. its been on 2/2 knee fracture, applied by ortho and managed by ptn's wife as per ptn's and wife's request , this was d/w nursing and nurse manager ms Ruiz.. immobilizer should be managed by orthopedics and nursing. will keep it off, only keep on when repositioning for care and then remove. wound care to F/U . this was discussed at length w daughter Cori and wife Dee Dee.   -3/1-3/2: ptn is smiling, pain free, had HD 2/28 and 3/1, awaiting RLE angiogram 3/3, on Heparin drip, euvolemic  -2/28: ptn is lethargic, awaiting RLE angiogram possible fem-fem bypass hopefully on 3/3 pending OR availability, awaiting HD today    -2/27:  plan for angiogram in am with possible angioplasty, possible stent, possible fem-fem bypass. medically cleared for angiogram and possible surgery, stent, angioplasty. pain is controlled. remains on heparin drip as per vascular. HD as per renal    -2/26:  ptn is sleeping, pain is controlled, he was seen by wound care and vascular attending. planning on angiogram 2/2 severe PAD in LE on CTA. he also spoke to HCP. ptn and HCP in agreement. ptn was started on HEPARIN drip as per vascular recs. RIJ permacath done 2/25    - 2/25: ptn states he is hallucinating, but not at present, his MS is at baseline, his pain is controlled, he still needs prn pain meds when he is moved in the bed or for testing or for HD. awaiting Permacath, AVF creation, LE bypass to be d/w Dr. Swenson and the ptn tomorrow. ptn has cardiology clearance  if he opts for. Ptn has sacral decubiti and posterior thigh and buttock decibiti and b/l heel decubiti. Z flow bootie d/w RN, get wound care consult    -2/24: pain is controlled  if the LLE is immobile and fully extended. doesn't tolerate the knee immobilizer. has a medium condyle and acute on chronic patella fx in LEFT knee. as per vascular ptn will need iliac stent  w a fem-fem bypass, possible axillo-femoral bypass. for this surgery he would need cardiac work up . more pressing surgery is LUE AVF creation,  in IR will arrange for Permacath. for pain control: Motrin 400 mg q12H, Oxy ER 30 mg q12H, Oxy IR 10 for mod pain and dilaudid 2 mg iv for severe pain. still has pruritis though improved, will raise atarax 25 mg to tid. plan of care d/w daughter Norma Persaud , ptn and his wife. Also d/w renal, card, vascular, ACP    -2/23: Daughter Cori is the HCP, she and the ptn filled out the paperwork. daily plan and findings d/w her and the ptn. MS is at abseline, c/o b/l LE foot pain and Left Knee pain. xrays of left knee: cannot r/o acute on chronic inferior pole patellar Fx. knee immobilizer is on, awaiting ortho consult. doubt needs any intervention awaiting Ct chest today, will add CT Left knee. ptn states itching has recurred, severe pain has recurred. will resume Atatrax ( 25 mg bid) and Oxycodone ER , but at a lower dose 15 mg q12H. cont prn analgesics. HD as per renal, awaiting Vascular consult f/u re CTA A/L &LE and recs. ptn also wants AVF surgery on this admission. Coughing has stopped    - 2/22: ptn is drowsy but arousable, calmer today, answers questions appropriately. he is pain free, he stopped coughing, he denies having pruritis: will DC:  OXY ER, Atarax, Tessalon perles.     -  2/21: ptn is tearful, a bit confused, coughing, recognizes me and family at bedside. GOC d/w daughter and wife. AMS prob delirium 2/2 acute illness +/- opiods, lyrica, atarac. will lower atarak to tid, dc lyrica, cont Oxy 2/2 ptn has severe LE pain. neuro called for eval. Head ct done yesterday w no acute findings. CTA A/P w LE run fof: severe PAD, vascular to follow up on plan fo care. plan for HD tomorrow and next week place on tiw schedule of MWF. will need tunneled catheter prior to DC and will d/w vascular scheduling of AVF. ptn wants all to be done while inptn. daughter wants to be HCP as per ptn's wishes. she was given paperwork to get it signed and witnessed and will present to nursing thereafter. details of findings and complaints and plan of care d/w daughter and wife. spent 60 min. RVP ordered. start mucinex , tessalon perles, duonebs, get CT chest to r/o PNA. pulm called    -  2/20:  ptn had RRT 2/2 AMS and tremors. no SZ, no LOC. noted to have Na 123( hyponatremia), given 500 cc NS. Gabapentin DCed. ptn had been taking gabapentin at home PTA. Pain is controlled. Pruritis resolved, tolerating HD otherwise.     - Pain management:  cont Oxycodone 10 IR q6H,  DIlaudid prn severe pain 2 mg q4H prn.   - cont outptn meds  - DVT ppx w HSC

## 2025-03-03 NOTE — PROGRESS NOTE ADULT - SUBJECTIVE AND OBJECTIVE BOX
Patient is a 74y old  Male who presents with a chief complaint of ESRD requiring HD, uremia (03 Mar 2025 14:59)      SUBJECTIVE / OVERNIGHT EVENTS: ptn is s/p angiogram RLE : b/l iliac arteries w successful angioplasty and stents. in PACU. awaitng HD.  ptn has a pressure wound from the LLE immobilizer posteriorly proximal to the knee. its been on 2/2 knee fracture, applied by ortho and managed by ptn's wife as per ptn's and wife's request , this was d/w nursing and nurse manager ms Ruiz.. immobilizer should be managed by orthopedics and nursing. will keep it off, only keep on when repositioning for care and then remove. wound care to F/U . this was discussed at length w daughter Cori and wife Dee Dee.     MEDICATIONS  (STANDING):  acetaminophen   IVPB .. 1000 milliGRAM(s) IV Intermittent once  albuterol/ipratropium for Nebulization 3 milliLiter(s) Nebulizer every 6 hours  aspirin  chewable 81 milliGRAM(s) Oral daily  atorvastatin 40 milliGRAM(s) Oral at bedtime  epoetin aleah-epbx (RETACRIT) Injectable 13386 Unit(s) IV Push <User Schedule>  guaiFENesin ER 1200 milliGRAM(s) Oral every 12 hours  hydrOXYzine hydrochloride 25 milliGRAM(s) Oral three times a day  ibuprofen  Tablet. 400 milliGRAM(s) Oral every 12 hours  oxyCODONE  ER Tablet 30 milliGRAM(s) Oral every 12 hours  pantoprazole    Tablet 40 milliGRAM(s) Oral before breakfast    MEDICATIONS  (PRN):  acetaminophen     Tablet .. 650 milliGRAM(s) Oral every 6 hours PRN Temp greater or equal to 38C (100.4F), Mild Pain (1 - 3)  HYDROmorphone  Injectable 2 milliGRAM(s) IV Push every 6 hours PRN Severe Pain (7 - 10)  oxyCODONE    IR 10 milliGRAM(s) Oral every 6 hours PRN Moderate Pain (4 - 6)  sodium chloride 0.9% lock flush 10 milliLiter(s) IV Push every 1 hour PRN Pre/post blood products, medications, blood draw, and to maintain line patency      Vital Signs Last 24 Hrs  T(F): 96.8 (03-03-25 @ 14:28), Max: 97.8 (03-03-25 @ 00:03)  HR: 69 (03-03-25 @ 16:15) (68 - 88)  BP: 120/60 (03-03-25 @ 16:01) (87/52 - 157/80)  RR: 17 (03-03-25 @ 16:15) (16 - 20)  SpO2: 99% (03-03-25 @ 16:15) (96% - 100%)  Telemetry:   CAPILLARY BLOOD GLUCOSE        I&O's Summary    02 Mar 2025 07:01  -  03 Mar 2025 07:00  --------------------------------------------------------  IN: 127 mL / OUT: 350 mL / NET: -223 mL    03 Mar 2025 07:01  -  03 Mar 2025 16:20  --------------------------------------------------------  IN: 620 mL / OUT: 200 mL / NET: 420 mL        PHYSICAL EXAM:  GENERAL: NAD, well-developed  HEAD:  Atraumatic, Normocephalic  EYES: EOMI, PERRLA, conjunctiva and sclera clear  NECK: Supple, No JVD  CHEST/LUNG: Clear to auscultation bilaterally; No wheeze  HEART: Regular rate and rhythm; No murmurs, rubs, or gallops  ABDOMEN: Soft, Nontender, Nondistended; Bowel sounds present  EXTREMITIES:  2+ Peripheral Pulses, No clubbing, cyanosis, or edema  PSYCH: AAOx3  NEUROLOGY: non-focal  SKIN: No rashes or lesions    LABS:                        8.5    11.77 )-----------( 193      ( 03 Mar 2025 07:13 )             28.5     03-03    129[L]  |  92[L]  |  25[H]  ----------------------------<  161[H]  3.9   |  25  |  4.11[H]    Ca    8.0[L]      03 Mar 2025 01:12  Phos  2.1     03-03  Mg     2.1     03-03      PT/INR - ( 02 Mar 2025 13:53 )   PT: 20.8 sec;   INR: 1.84 ratio         PTT - ( 03 Mar 2025 07:13 )  PTT:102.6 sec      Urinalysis Basic - ( 03 Mar 2025 01:12 )    Color: x / Appearance: x / SG: x / pH: x  Gluc: 161 mg/dL / Ketone: x  / Bili: x / Urobili: x   Blood: x / Protein: x / Nitrite: x   Leuk Esterase: x / RBC: x / WBC x   Sq Epi: x / Non Sq Epi: x / Bacteria: x        RADIOLOGY & ADDITIONAL TESTS:    Imaging Personally Reviewed:    Consultant(s) Notes Reviewed:      Care Discussed with Consultants/Other Providers:

## 2025-03-03 NOTE — PRE-OP CHECKLIST - BSA (M2)
MICHELL: Prerenal azotemia  Elevated lithium level  Hypokalemia  Bipolar disorder, Agitation  Rhabdomyolysis    Stable renal indices. Potassium supplementation. Potassium supplementation. To continue current meds. 
2.04
2.04

## 2025-03-03 NOTE — CHART NOTE - NSCHARTNOTEFT_GEN_A_CORE
POST-OP NOTE    BRIAN DEMPSEY | [id number] | [facility] 8MON 809 W1    Procedure: s/p b/l iliac artery stents     Subjective: Patient resting in bed, with wife at bedside. Patient wants to know what procedure was done. has desire to eat.    Vital Signs Last 24 Hrs  T(C): 36.4 (03 Mar 2025 20:01), Max: 36.8 (03 Mar 2025 18:52)  T(F): 97.6 (03 Mar 2025 20:01), Max: 98.3 (03 Mar 2025 18:52)  HR: 74 (03 Mar 2025 20:01) (68 - 88)  BP: 134/81 (03 Mar 2025 20:01) (87/52 - 157/80)  BP(mean): 92 (03 Mar 2025 18:00) (67 - 98)  RR: 20 (03 Mar 2025 20:01) (16 - 20)  SpO2: 100% (03 Mar 2025 20:01) (96% - 100%)    Parameters below as of 03 Mar 2025 20:01  Patient On (Oxygen Delivery Method): nasal cannula  O2 Flow (L/min): 3    I&O's Summary    02 Mar 2025 07:01  -  03 Mar 2025 07:00  --------------------------------------------------------  IN: 127 mL / OUT: 350 mL / NET: -223 mL    03 Mar 2025 07:01  -  03 Mar 2025 20:12  --------------------------------------------------------  IN: 740 mL / OUT: 200 mL / NET: 540 mL                            8.5    11.77 )-----------( 193      ( 03 Mar 2025 07:13 )             28.5     03-03    129[L]  |  92[L]  |  25[H]  ----------------------------<  161[H]  3.9   |  25  |  4.11[H]    Ca    8.0[L]      03 Mar 2025 01:12  Phos  2.1     03-03  Mg     2.1     03-03     PT/INR - ( 02 Mar 2025 13:53 )   PT: 20.8 sec;   INR: 1.84 ratio         PTT - ( 03 Mar 2025 07:13 )  PTT:102.6 sec    PHYSICAL EXAM:  Gen: NAD  Resp: breathing easily, no stridor  CV: RRR  groin: b/l groins soft, no mass or hematoma.   Skin: Incision c/d/i. Normal color, no rashes or lesions    Assessment: 73 yo male, s/p b/l iliac artery stents. recovering well.    plan:  - HD tomorrow  - renal diet  - ASA/Plavix  - tylenol, ibuprofen, oxy

## 2025-03-03 NOTE — PROGRESS NOTE ADULT - ASSESSMENT
74M with history of HTN, polio with multiple subsequent problems including LE weakness requiring wheelchair, neurogenic bladder s/p suprapubic catheter, and colostomy s/p iatrogenic rectal injury 2/2023, and CKD 5 presenting to the ED with weakness and falls. Admitted for initiation of HD now s/p R IJ shiley placement on 2/17/25 and conversion to tunneled catheter on 2/25. Vascular surgery initially consulted for AVF creation; however, patient's worsening PAD with worsening ischemic changes is the more urgent issue.     Plan:  - planning for RLE Angiogram, possible bilateral, possible angioplasty, possible stent placement, possible femoral-femoral bypass today (Monday)     - pre opped: consented, NPO and labs reviewed     - please repeat BMP after repletions  - Cardiac clearance documented 2/28; medicine clearance documented 2/27     - AVF creation is on hold  - c/w hep gtt  - s/p HD Saturday  - continue to protect nondominant left arm  - vascular surgery to follow    Vascular Surgery   [id number]

## 2025-03-03 NOTE — PROGRESS NOTE ADULT - ASSESSMENT
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 The initial plan was that he would present to the [facility] ER Monday 2/17 for HD initiation. However, day of admission, he fell and sustained trauma to his knee. Called to consult for cough, elevated R hemidiaphragm.

## 2025-03-03 NOTE — PROGRESS NOTE ADULT - SUBJECTIVE AND OBJECTIVE BOX
Requested by RN to remove right/ brachial arterial line prior to transfer to floor. Arterial line removed, direct pressure to insertion site applied for 10 minutes.  Gauze and tape dressing applied to insertion site. No swelling, no bleeding, no hematoma present. Palpable radial and ulner pulses present after removal of catheter.  Full motor/sensory function intact.

## 2025-03-04 NOTE — PROGRESS NOTE ADULT - SUBJECTIVE AND OBJECTIVE BOX
[facility]  SABINO Griffin Y. Patel, S. Shah, G. Casimir  [phone]  ([phone] - weekdays after 5pm and weekends)    Name: BRIAN DEMPSEY  Age/Gender: 74y Male  MRN: [id number]    Interval History:  Patient seen and examined this morning.   Resting comfortably.   Notes reviewed  No concerning overnight events  Afebrile   Allergies: No Known Allergies      Objective:  Vitals:   T(F): 97.3 (03-04-25 @ 07:58), Max: 98.3 (03-03-25 @ 18:52)  HR: 82 (03-04-25 @ 07:58) (68 - 88)  BP: 145/66 (03-04-25 @ 07:58) (87/52 - 157/80)  RR: 18 (03-04-25 @ 07:58) (16 - 20)  SpO2: 96% (03-04-25 @ 07:58) (94% - 100%)  Physical Examination:  General: no acute distress  HEENT: normocephalic, atraumatic  Respiratory: breathing comfortably  Cardiovascular: S1 and S2 present  Gastrointestinal: nondistended  Extremities: no edema  Skin: no visible rash    Laboratory Studies:  CBC:                       9.5    14.41 )-----------( 177      ( 04 Mar 2025 06:56 )             31.4     WBC Trend:  14.41 03-04-25 @ 06:56  11.77 03-03-25 @ 07:13  12.22 03-03-25 @ 01:12  16.50 03-02-25 @ 07:28  10.79 03-01-25 @ 07:02  9.29 02-28-25 @ 09:56  9.60 02-27-25 @ 05:43  8.81 02-26-25 @ 21:22  8.98 02-26-25 @ 07:30  6.92 02-25-25 @ 10:12    CMP: 03-04    126[L]  |  88[L]  |  32[H]  ----------------------------<  141[H]  4.9   |  20[L]  |  4.49[H]    Ca    7.5[L]      04 Mar 2025 06:55  Phos  2.1     03-03  Mg     2.1     03-03    TPro  5.9[L]  /  Alb  2.4[L]  /  TBili  0.1[L]  /  DBili  x   /  AST  21  /  ALT  7[L]  /  AlkPhos  159[H]  03-04    Creatinine: 4.49 mg/dL (03-04-25 @ 06:55)  Creatinine: 4.49 mg/dL (03-04-25 @ 06:55)  Creatinine: 4.11 mg/dL (03-03-25 @ 01:12)  Creatinine: 5.69 mg/dL (03-01-25 @ 07:01)  Creatinine: 5.26 mg/dL (02-26-25 @ 07:30)  Creatinine: 4.28 mg/dL (02-25-25 @ 10:12)    LIVER FUNCTIONS - ( 04 Mar 2025 06:55 )  Alb: 2.4 g/dL / Pro: 5.9 g/dL / ALK PHOS: 159 U/L / ALT: 7 U/L / AST: 21 U/L / GGT: x           Microbiology: reviewed   Culture - Blood (collected 02-20-25 @ 01:55)  Source: .Blood Blood-Peripheral  Final Report (02-25-25 @ 04:00):    No growth at 5 days    Radiology: reviewed     Medications:  acetaminophen     Tablet .. 650 milliGRAM(s) Oral every 6 hours PRN  acetaminophen   IVPB .. 1000 milliGRAM(s) IV Intermittent once  albuterol/ipratropium for Nebulization 3 milliLiter(s) Nebulizer every 6 hours  aspirin  chewable 81 milliGRAM(s) Oral daily  atorvastatin 40 milliGRAM(s) Oral at bedtime  clopidogrel Tablet 75 milliGRAM(s) Oral daily  epoetin aleah-epbx (RETACRIT) Injectable 67118 Unit(s) IV Push <User Schedule>  guaiFENesin ER 1200 milliGRAM(s) Oral every 12 hours  HYDROmorphone  Injectable 2 milliGRAM(s) IV Push every 6 hours PRN  hydrOXYzine hydrochloride 25 milliGRAM(s) Oral three times a day  ibuprofen  Tablet. 400 milliGRAM(s) Oral every 12 hours  oxyCODONE    IR 10 milliGRAM(s) Oral every 6 hours PRN  oxyCODONE  ER Tablet 30 milliGRAM(s) Oral every 12 hours  pantoprazole    Tablet 40 milliGRAM(s) Oral before breakfast  sodium chloride 0.9% lock flush 10 milliLiter(s) IV Push every 1 hour PRN    Prior/Completed Antimicrobials:  piperacillin/tazobactam IVPB.  piperacillin/tazobactam IVPB.-  vancomycin  IVPB

## 2025-03-04 NOTE — PROGRESS NOTE ADULT - SUBJECTIVE AND OBJECTIVE BOX
NEPHROLOGY     Patient seen and examined at HD, no fluid removed, comfortable on room air, denies sob, reports of knee pain, in no acute distress.     MEDICATIONS  (STANDING):  albuterol/ipratropium for Nebulization 3 milliLiter(s) Nebulizer every 6 hours  aspirin  chewable 81 milliGRAM(s) Oral daily  atorvastatin 40 milliGRAM(s) Oral at bedtime  chlorhexidine 2% Cloths 1 Application(s) Topical daily  clopidogrel Tablet 75 milliGRAM(s) Oral daily  epoetin aleah-epbx (RETACRIT) Injectable 42205 Unit(s) IV Push <User Schedule>  guaiFENesin ER 1200 milliGRAM(s) Oral every 12 hours  hydrOXYzine hydrochloride 25 milliGRAM(s) Oral three times a day  ibuprofen  Tablet. 400 milliGRAM(s) Oral every 12 hours  oxyCODONE  ER Tablet 30 milliGRAM(s) Oral every 12 hours  pantoprazole    Tablet 40 milliGRAM(s) Oral before breakfast    VITALS:  T(C): , Max: 36.8 (03-03-25 @ 18:52)  T(F): , Max: 98.3 (03-03-25 @ 18:52)  HR: 96 (03-04-25 @ 11:00)  BP: 117/54 (03-04-25 @ 11:00)  BP(mean): 92 (03-03-25 @ 18:00)  RR: 18 (03-04-25 @ 11:00)  SpO2: 94% (03-04-25 @ 11:00)    I and O's:    03-03 @ 07:01  -  03-04 @ 07:00  --------------------------------------------------------  IN: 740 mL / OUT: 200 mL / NET: 540 mL    PHYSICAL EXAM:  Constitutional: lethargic, NAD  HEENT: NCAT, DMM  Neck: Supple, No JVD  Respiratory: CTA-b/l  Cardiovascular: RRR s1s2, no m/r/g  Gastrointestinal: BS+, soft, NT/ND  Extremities: No peripheral edema b/l  Neurological: reduced generalized strength  Back: no CVAT b/l  Skin: (+)violaceous changes 1st/2nd toes of R foot  Access: RI tunneled cath    LABS:                        9.5    14.41 )-----------( 177      ( 04 Mar 2025 06:56 )             31.4     03-04    126[L]  |  88[L]  |  32[H]  ----------------------------<  141[H]  4.9   |  20[L]  |  4.49[H]    Ca    7.5[L]      04 Mar 2025 06:55  Phos  2.1     03-03  Mg     2.1     03-03    TPro  5.9[L]  /  Alb  2.4[L]  /  TBili  0.1[L]  /  DBili  x   /  AST  21  /  ALT  7[L]  /  AlkPhos  159[H]  03-04

## 2025-03-04 NOTE — PROGRESS NOTE ADULT - ASSESSMENT
74 year old male with CKD, sp polio, paraplegia with associated neurogenic bladder s/p suprapubic catheter who was admitted s/p fall on 2/16.   CKD - ESRD, now s/p DEYA [place] 2/17, on HD  ruled out cellulitis around suprapubic cath  2/20 s/p RRT for tremors and confusion -- pt with chronic tremors although notably worse  hyponatremia    mrsa negative   2/20 CXR with clear lungs   SPC site with chronic/stable inflammatory changes, no drainage - no sign of SSTI  CTA abd with occlusion of b/l external iliac arteries and b/l superficial femoral arteries with distal reconstitution at popliteal arteries; patent three vessel runoff of RLE with 2 vessel runoff of LLE with occlusion of L mid anterior tibial artery with distal reconstitution  Bcx negative to date   mental status at baseline  WBC noted post op, remains afebrile   Vascular following for worsening PAD with worsening ischemic changes   -3/3 s/p RLE angiogram b/l iliac artery stents     s/p vancomycin x1 2/20  s/p zosyn 2/20-2/22      Recommendations:   Continue off antibiotics   Vascular surgery following  HD per renal    Monitor temps/WBC  Continue rest of care per primary team       Bridgette Ramirez M.D.  [facility]  Available on [employer] TEAMS - *PREFERRED*  [phone]  After 5pm on weekdays and all day on weekends - please call [phone]     Thank you for consulting us and involving us in the management of this patients case. In addition to reviewing history, imaging, documents, labs, microbiology, took into account antibiotic stewardship, local antibiogram and infection control strategies and potential transmission issues at time of treatment decision making process.

## 2025-03-04 NOTE — PROGRESS NOTE ADULT - ASSESSMENT
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 HD today (completed)    (2)Hyponatremia - slightly lower     (3)Hypophosphatemia - s/p IV sodium phosphate yesterday    (4)Anemia - s/p IV iron; on Retacrit with HD    (5)Ortho - nondisplaced acute on chronic patellar fracture - Ortho input appreciated - recommending conservative therapy as inpatient    (6)PAD - s/p aortogram, angioplasty, b/l iliac stent placement yesterday     RECOMMEND:  (1)HD today (completed)   (2)Ortho follow up    D/w Dr. Denny and JARAD Darden, [facility]  [phone]

## 2025-03-04 NOTE — PROGRESS NOTE ADULT - SUBJECTIVE AND OBJECTIVE BOX
Subjective: Patient seen and examined. No new events except as noted.   s/p b/l iliac artery stents   Pt seen at HD   has a pressure wound from the LLE immobilizer posteriorly proximal to the knee.    REVIEW OF SYSTEMS:    CONSTITUTIONAL: + weakness, fevers or chills  EYES/ENT: No visual changes;  No vertigo or throat pain   NECK: No pain or stiffness  RESPIRATORY: No cough, wheezing, hemoptysis; No shortness of breath  CARDIOVASCULAR: No chest pain or palpitations  GASTROINTESTINAL: No abdominal or epigastric pain. No nausea, vomiting, or hematemesis; No diarrhea or constipation. No melena or hematochezia.  GENITOURINARY: No dysuria, frequency or hematuria  NEUROLOGICAL: No numbness or weakness  SKIN: No itching, burning, rashes, or lesions   All other review of systems is negative unless indicated above.    MEDICATIONS:  MEDICATIONS  (STANDING):  acetaminophen   IVPB .. 1000 milliGRAM(s) IV Intermittent once  albuterol/ipratropium for Nebulization 3 milliLiter(s) Nebulizer every 6 hours  aspirin  chewable 81 milliGRAM(s) Oral daily  atorvastatin 40 milliGRAM(s) Oral at bedtime  chlorhexidine 2% Cloths 1 Application(s) Topical daily  clopidogrel Tablet 75 milliGRAM(s) Oral daily  epoetin aleah-epbx (RETACRIT) Injectable 33061 Unit(s) IV Push <User Schedule>  guaiFENesin ER 1200 milliGRAM(s) Oral every 12 hours  hydrOXYzine hydrochloride 25 milliGRAM(s) Oral three times a day  ibuprofen  Tablet. 400 milliGRAM(s) Oral every 12 hours  oxyCODONE  ER Tablet 30 milliGRAM(s) Oral every 12 hours  pantoprazole    Tablet 40 milliGRAM(s) Oral before breakfast      PHYSICAL EXAM:  T(C): 36.3 (03-04-25 @ 07:58), Max: 36.8 (03-03-25 @ 18:52)  HR: 82 (03-04-25 @ 07:58) (69 - 88)  BP: 145/66 (03-04-25 @ 07:58) (87/52 - 145/66)  RR: 18 (03-04-25 @ 07:58) (16 - 20)  SpO2: 96% (03-04-25 @ 07:58) (94% - 100%)  Wt(kg): --  I&O's Summary    03 Mar 2025 07:01  -  04 Mar 2025 07:00  --------------------------------------------------------  IN: 740 mL / OUT: 200 mL / NET: 540 mL          Appearance: NAD  HEENT:  Dry oral mucosa, PERRL, EOMI	  Lymphatic: No lymphadenopathy  Cardiovascular: Normal S1 S2, No JVD, No murmurs, No edema  Respiratory: Lungs clear to auscultation	  Psychiatry: A & O x 3, Mood & affect appropriate  Skin: No rashes, No ecchymoses, No cyanosis	  Neurologic: Non-focal  GI/Abd: Soft, obese, nontender. Dressing to Q C/D/I. Suprapubic catheter in place  Ext: Palp femoral pulses bilat. BLE atrophic, minimal dorsi/plantarflexion bilaterally. Sensation grossly intact. LLE edematous compared to R, erythema to right toes/forefoot.  LLE:  small superficial abrasion, +edema and +ecchymosis over L knee  +TTP over L knee, no TTP along remainder of extremity; compartments soft  Limited ROM at knee 2/2 pain  No gross varus/valgus laxity, but assessment limited 2/2 pain  Motor: TA/EHL/GS/FHL intact  Sensory: DP/SP/Tib/Jordan/Saph SILT  +DP pulse (symmetric relative to contralateral side), WWP   pressure wound from the LLE immobilizer posteriorly proximal to the knee.  Vascular: Peripheral pulses palpable 2+ bilaterally      LABS:    CARDIAC MARKERS:                                9.5    14.41 )-----------( 177      ( 04 Mar 2025 06:56 )             31.4     03-04    126[L]  |  88[L]  |  32[H]  ----------------------------<  141[H]  4.9   |  20[L]  |  4.49[H]    Ca    7.5[L]      04 Mar 2025 06:55  Phos  2.1     03-03  Mg     2.1     03-03    TPro  5.9[L]  /  Alb  2.4[L]  /  TBili  0.1[L]  /  DBili  x   /  AST  21  /  ALT  7[L]  /  AlkPhos  159[H]  03-04    proBNP:   Lipid Profile:   HgA1c:   TSH:             TELEMETRY: 	    ECG:  	  RADIOLOGY:   DIAGNOSTIC TESTING:  [ ] Echocardiogram:  [ ]  Catheterization:  [ ] Stress Test:    OTHER:

## 2025-03-04 NOTE — PROGRESS NOTE ADULT - SUBJECTIVE AND OBJECTIVE BOX
Date of Service: 03-04-25 @ 11:09    Patient is a 74y old  Male who presents with a chief complaint of ESRD requiring HD, uremia (04 Mar 2025 10:41)      Any change in ROS:   Seen in HD  Denies CP, SOB     MEDICATIONS  (STANDING):  acetaminophen   IVPB .. 1000 milliGRAM(s) IV Intermittent once  albuterol/ipratropium for Nebulization 3 milliLiter(s) Nebulizer every 6 hours  aspirin  chewable 81 milliGRAM(s) Oral daily  atorvastatin 40 milliGRAM(s) Oral at bedtime  chlorhexidine 2% Cloths 1 Application(s) Topical daily  clopidogrel Tablet 75 milliGRAM(s) Oral daily  epoetin aleah-epbx (RETACRIT) Injectable 80755 Unit(s) IV Push <User Schedule>  guaiFENesin ER 1200 milliGRAM(s) Oral every 12 hours  hydrOXYzine hydrochloride 25 milliGRAM(s) Oral three times a day  ibuprofen  Tablet. 400 milliGRAM(s) Oral every 12 hours  oxyCODONE  ER Tablet 30 milliGRAM(s) Oral every 12 hours  pantoprazole    Tablet 40 milliGRAM(s) Oral before breakfast    MEDICATIONS  (PRN):  acetaminophen     Tablet .. 650 milliGRAM(s) Oral every 6 hours PRN Temp greater or equal to 38C (100.4F), Mild Pain (1 - 3)  HYDROmorphone  Injectable 2 milliGRAM(s) IV Push every 6 hours PRN Severe Pain (7 - 10)  oxyCODONE    IR 10 milliGRAM(s) Oral every 6 hours PRN Moderate Pain (4 - 6)  sodium chloride 0.9% lock flush 10 milliLiter(s) IV Push every 1 hour PRN Pre/post blood products, medications, blood draw, and to maintain line patency    Vital Signs Last 24 Hrs  T(C): 36.3 (04 Mar 2025 07:58), Max: 36.8 (03 Mar 2025 18:52)  T(F): 97.3 (04 Mar 2025 07:58), Max: 98.3 (03 Mar 2025 18:52)  HR: 82 (04 Mar 2025 07:58) (69 - 88)  BP: 145/66 (04 Mar 2025 07:58) (87/52 - 145/66)  BP(mean): 92 (03 Mar 2025 18:00) (67 - 96)  RR: 18 (04 Mar 2025 07:58) (16 - 20)  SpO2: 96% (04 Mar 2025 07:58) (94% - 100%)    Parameters below as of 04 Mar 2025 07:58  Patient On (Oxygen Delivery Method): nasal cannula  O2 Flow (L/min): 3      I&O's Summary    03 Mar 2025 07:01  -  04 Mar 2025 07:00  --------------------------------------------------------  IN: 740 mL / OUT: 200 mL / NET: 540 mL          Physical Exam:   GENERAL: NAD, well-groomed, well-developed  HEENT: VINNY/   Atraumatic, Normocephalic  ENMT: No tonsillar erythema, exudates, or enlargement; Moist mucous membranes, Good dentition, No lesions  NECK: Supple, No JVD, Normal thyroid  CHEST/LUNG: Decreased at bases   CVS: Regular rate and rhythm; No murmurs, rubs, or gallops  GI: : Soft, Nontender, Nondistended; Bowel sounds present  NERVOUS SYSTEM:  Alert & Oriented X3  EXTREMITIES:  2+ Peripheral Pulses, No clubbing, cyanosis, or edema  LYMPH: No lymphadenopathy noted  SKIN: No rashes or lesions  ENDOCRINOLOGY: No Thyromegaly  PSYCH: Appropriate    Labs:  ABG - ( 03 Mar 2025 13:57 )  pH, Arterial: 7.36  pH, Blood: x     /  pCO2: 44    /  pO2: 393   / HCO3: 25    / Base Excess: -0.6  /  SaO2: 98.9                            9.5    14.41 )-----------( 177      ( 04 Mar 2025 06:56 )             31.4                         8.5    11.77 )-----------( 193      ( 03 Mar 2025 07:13 )             28.5                         7.9    12.22 )-----------( 189      ( 03 Mar 2025 01:12 )             26.3                         8.9    16.50 )-----------( 220      ( 02 Mar 2025 07:28 )             29.5                         10.0   10.79 )-----------( 239      ( 01 Mar 2025 07:02 )             33.2     03-04    126[L]  |  88[L]  |  32[H]  ----------------------------<  141[H]  4.9   |  20[L]  |  4.49[H]  03-03    129[L]  |  92[L]  |  25[H]  ----------------------------<  161[H]  3.9   |  25  |  4.11[H]  03-01    131[L]  |  94[L]  |  33[H]  ----------------------------<  88  4.7   |  22  |  5.69[H]    Ca    7.5[L]      04 Mar 2025 06:55  Ca    8.0[L]      03 Mar 2025 01:12  Phos  2.1     03-03  Mg     2.1     03-03    TPro  5.9[L]  /  Alb  2.4[L]  /  TBili  0.1[L]  /  DBili  x   /  AST  21  /  ALT  7[L]  /  AlkPhos  159[H]  03-04    CAPILLARY BLOOD GLUCOSE          LIVER FUNCTIONS - ( 04 Mar 2025 06:55 )  Alb: 2.4 g/dL / Pro: 5.9 g/dL / ALK PHOS: 159 U/L / ALT: 7 U/L / AST: 21 U/L / GGT: x           PT/INR - ( 02 Mar 2025 13:53 )   PT: 20.8 sec;   INR: 1.84 ratio         PTT - ( 03 Mar 2025 07:13 )  PTT:102.6 sec  Urinalysis Basic - ( 04 Mar 2025 06:55 )    Color: x / Appearance: x / SG: x / pH: x  Gluc: 141 mg/dL / Ketone: x  / Bili: x / Urobili: x   Blood: x / Protein: x / Nitrite: x   Leuk Esterase: x / RBC: x / WBC x   Sq Epi: x / Non Sq Epi: x / Bacteria: x    Studies  < from: CT Chest No Cont (02.23.25 @ 16:46) >  ACC: [id number] EXAM:  CT CHEST   ORDERED BY:  NELL TOLBERT     PROCEDURE DATE:  02/23/2025          INTERPRETATION:  CLINICAL INFORMATION: 74-year-old male with cough    TECHNIQUE: A volumetric CT acquisition of the chest was obtained from the   thoracic inlet to the upper abdomen, without administration of   intravenous contrast. This CT scan was performed with one or more of the   following dose optimization: iterative reconstruction, automatic exposure   control, and/or manual adjustmentof mAs and kVp according to the   patient's size. 3D MIP and volume-rendered images were created at the   scanner.    COMPARISON: The study was compared to CT chest dated 2/23/2025    FINDINGS:  Lines and Tubes: There is a right-sided central venous catheter with the   tip terminating in distal right brachiocephalic vein.    Mediastinum: The thyroid and thoracic inlet are normal. There is no   enlarged axiliary, mediastinal, or hilar lymph nodes. The heart size is   within normal limits. There isno pericardial effusion. The aorta is   normal in course and caliber, with mild calcified atheromas. Lobar   pulmonary arteries have low attenuation, likely artifactual and due to   beam hardening. The esophagus is unremarkable.    Lung: The central tracheobronchial tree is patent. There is no focal   consolidation. There is mild peribronchial wall thickening most prominent   in the left lower lobe. There are mucoid impaction and airway associated   and subpleural groundglass opacities, more prominent in the left lower   lobe and to a lesser degree in right lower lobe..    Pleura: There is no pleural effusion or pneumothorax.    Abdomen: There is prominence of right renal pelvis. Cholelithiasis is   noted. There is no acute upper abdominal finding.    Bone and Soft Tissue: There is no evidence of osteosclerotic or   osteolytic lesions. There are multilevel degenerative changes of thoracic   spine , with disk space narrowing and osteophytosis. There is mild   subcutaneous edema in left lateral upper abdominal wall.    IMPRESSION:  Mucoid impaction and airway associated subpleural groundglass opacities,   most prominent in left lower lobe and to a lesser degree in right lower   lobe.  Mild bronchial wall thickening, likely inflammatory.    --- End of Report ---    < end of copied text >                 Date of Service: 03-04-25 @ 11:09    Patient is a 74y old  Male who presents with a chief complaint of ESRD requiring HD, uremia (04 Mar 2025 10:41)      Any change in ROS:   Seen in HD  Denies CP, SOB     MEDICATIONS  (STANDING):  acetaminophen   IVPB .. 1000 milliGRAM(s) IV Intermittent once  albuterol/ipratropium for Nebulization 3 milliLiter(s) Nebulizer every 6 hours  aspirin  chewable 81 milliGRAM(s) Oral daily  atorvastatin 40 milliGRAM(s) Oral at bedtime  chlorhexidine 2% Cloths 1 Application(s) Topical daily  clopidogrel Tablet 75 milliGRAM(s) Oral daily  epoetin aleah-epbx (RETACRIT) Injectable 88613 Unit(s) IV Push <User Schedule>  guaiFENesin ER 1200 milliGRAM(s) Oral every 12 hours  hydrOXYzine hydrochloride 25 milliGRAM(s) Oral three times a day  ibuprofen  Tablet. 400 milliGRAM(s) Oral every 12 hours  oxyCODONE  ER Tablet 30 milliGRAM(s) Oral every 12 hours  pantoprazole    Tablet 40 milliGRAM(s) Oral before breakfast    MEDICATIONS  (PRN):  acetaminophen     Tablet .. 650 milliGRAM(s) Oral every 6 hours PRN Temp greater or equal to 38C (100.4F), Mild Pain (1 - 3)  HYDROmorphone  Injectable 2 milliGRAM(s) IV Push every 6 hours PRN Severe Pain (7 - 10)  oxyCODONE    IR 10 milliGRAM(s) Oral every 6 hours PRN Moderate Pain (4 - 6)  sodium chloride 0.9% lock flush 10 milliLiter(s) IV Push every 1 hour PRN Pre/post blood products, medications, blood draw, and to maintain line patency    Vital Signs Last 24 Hrs  T(C): 36.3 (04 Mar 2025 07:58), Max: 36.8 (03 Mar 2025 18:52)  T(F): 97.3 (04 Mar 2025 07:58), Max: 98.3 (03 Mar 2025 18:52)  HR: 82 (04 Mar 2025 07:58) (69 - 88)  BP: 145/66 (04 Mar 2025 07:58) (87/52 - 145/66)  BP(mean): 92 (03 Mar 2025 18:00) (67 - 96)  RR: 18 (04 Mar 2025 07:58) (16 - 20)  SpO2: 96% (04 Mar 2025 07:58) (94% - 100%)    Parameters below as of 04 Mar 2025 07:58  Patient On (Oxygen Delivery Method): nasal cannula  O2 Flow (L/min): 3      I&O's Summary    03 Mar 2025 07:01  -  04 Mar 2025 07:00  --------------------------------------------------------  IN: 740 mL / OUT: 200 mL / NET: 540 mL          Physical Exam:   GENERAL: NAD, well-groomed, well-developed  HEENT: VINNY/   Atraumatic, Normocephalic  ENMT: No tonsillar erythema, exudates, or enlargement; Moist mucous membranes, Good dentition, No lesions  NECK: Supple, No JVD, Normal thyroid  CHEST/LUNG: Decreased at bases   CVS: Regular rate and rhythm; No murmurs, rubs, or gallops  GI: : Soft, Nontender, Nondistended; Bowel sounds present  NERVOUS SYSTEM:  Alert & Oriented X3  EXTREMITIES: cyanosed toes   LYMPH: No lymphadenopathy noted  SKIN: No rashes or lesions  ENDOCRINOLOGY: No Thyromegaly  PSYCH: Appropriate    Labs:  ABG - ( 03 Mar 2025 13:57 )  pH, Arterial: 7.36  pH, Blood: x     /  pCO2: 44    /  pO2: 393   / HCO3: 25    / Base Excess: -0.6  /  SaO2: 98.9                            9.5    14.41 )-----------( 177      ( 04 Mar 2025 06:56 )             31.4                         8.5    11.77 )-----------( 193      ( 03 Mar 2025 07:13 )             28.5                         7.9    12.22 )-----------( 189      ( 03 Mar 2025 01:12 )             26.3                         8.9    16.50 )-----------( 220      ( 02 Mar 2025 07:28 )             29.5                         10.0   10.79 )-----------( 239      ( 01 Mar 2025 07:02 )             33.2     03-04    126[L]  |  88[L]  |  32[H]  ----------------------------<  141[H]  4.9   |  20[L]  |  4.49[H]  03-03    129[L]  |  92[L]  |  25[H]  ----------------------------<  161[H]  3.9   |  25  |  4.11[H]  03-01    131[L]  |  94[L]  |  33[H]  ----------------------------<  88  4.7   |  22  |  5.69[H]    Ca    7.5[L]      04 Mar 2025 06:55  Ca    8.0[L]      03 Mar 2025 01:12  Phos  2.1     03-03  Mg     2.1     03-03    TPro  5.9[L]  /  Alb  2.4[L]  /  TBili  0.1[L]  /  DBili  x   /  AST  21  /  ALT  7[L]  /  AlkPhos  159[H]  03-04    CAPILLARY BLOOD GLUCOSE          LIVER FUNCTIONS - ( 04 Mar 2025 06:55 )  Alb: 2.4 g/dL / Pro: 5.9 g/dL / ALK PHOS: 159 U/L / ALT: 7 U/L / AST: 21 U/L / GGT: x           PT/INR - ( 02 Mar 2025 13:53 )   PT: 20.8 sec;   INR: 1.84 ratio         PTT - ( 03 Mar 2025 07:13 )  PTT:102.6 sec  Urinalysis Basic - ( 04 Mar 2025 06:55 )    Color: x / Appearance: x / SG: x / pH: x  Gluc: 141 mg/dL / Ketone: x  / Bili: x / Urobili: x   Blood: x / Protein: x / Nitrite: x   Leuk Esterase: x / RBC: x / WBC x   Sq Epi: x / Non Sq Epi: x / Bacteria: x    Studies  < from: CT Chest No Cont (02.23.25 @ 16:46) >  ACC: [id number] EXAM:  CT CHEST   ORDERED BY:  NELL TOLBERT     PROCEDURE DATE:  02/23/2025          INTERPRETATION:  CLINICAL INFORMATION: 74-year-old male with cough    TECHNIQUE: A volumetric CT acquisition of the chest was obtained from the   thoracic inlet to the upper abdomen, without administration of   intravenous contrast. This CT scan was performed with one or more of the   following dose optimization: iterative reconstruction, automatic exposure   control, and/or manual adjustmentof mAs and kVp according to the   patient's size. 3D MIP and volume-rendered images were created at the   scanner.    COMPARISON: The study was compared to CT chest dated 2/23/2025    FINDINGS:  Lines and Tubes: There is a right-sided central venous catheter with the   tip terminating in distal right brachiocephalic vein.    Mediastinum: The thyroid and thoracic inlet are normal. There is no   enlarged axiliary, mediastinal, or hilar lymph nodes. The heart size is   within normal limits. There isno pericardial effusion. The aorta is   normal in course and caliber, with mild calcified atheromas. Lobar   pulmonary arteries have low attenuation, likely artifactual and due to   beam hardening. The esophagus is unremarkable.    Lung: The central tracheobronchial tree is patent. There is no focal   consolidation. There is mild peribronchial wall thickening most prominent   in the left lower lobe. There are mucoid impaction and airway associated   and subpleural groundglass opacities, more prominent in the left lower   lobe and to a lesser degree in right lower lobe..    Pleura: There is no pleural effusion or pneumothorax.    Abdomen: There is prominence of right renal pelvis. Cholelithiasis is   noted. There is no acute upper abdominal finding.    Bone and Soft Tissue: There is no evidence of osteosclerotic or   osteolytic lesions. There are multilevel degenerative changes of thoracic   spine , with disk space narrowing and osteophytosis. There is mild   subcutaneous edema in left lateral upper abdominal wall.    IMPRESSION:  Mucoid impaction and airway associated subpleural groundglass opacities,   most prominent in left lower lobe and to a lesser degree in right lower   lobe.  Mild bronchial wall thickening, likely inflammatory.    --- End of Report ---    < end of copied text >

## 2025-03-04 NOTE — PROGRESS NOTE ADULT - ASSESSMENT
74M with history of HTN, polio with multiple subsequent problems including LE weakness requiring wheelchair, neurogenic bladder s/p suprapubic catheter, and colostomy s/p iatrogenic rectal injury 2/2023, and CKD 5 presenting to the ED with weakness and falls. Admitted for initiation of HD now s/p R IJ shiley placement on 2/17/25 and conversion to tunneled catheter on 2/25. Vascular surgery initially consulted for AVF creation; however, patient's worsening PAD with worsening ischemic changes is the more urgent issue now s/p b/l iliac stent placement on 3/3/25.     Plan:  - ASA/Plavix  - no signals   - HD today   - continue to protect nondominant left arm  - vascular surgery to follow    Vascular Surgery   [id number]

## 2025-03-04 NOTE — CHART NOTE - NSCHARTNOTEFT_GEN_A_CORE
Patient s/p Iliac stent bypass on 3/3 now with worsening cyanotic toes bilaterally. Pulses are absent and has been but toes are with acute changes in toes with worsening decolorization . Feet are warm to touch and good sensory response . Limited mobility 2/2 polio. Spoke with Dr Torres from vascular and patient will be seen .

## 2025-03-04 NOTE — PROGRESS NOTE ADULT - SUBJECTIVE AND OBJECTIVE BOX
SURGERY DAILY PROGRESS NOTE    24 Hour/Overnight Events: No acute events overnight    SUBJECTIVE: Patient seen and evaluated on AM rounds.   ------------------------------------------------------------------------------------------------------------  OBJECTIVE:  Vital Signs Last 24 Hrs  T(C): 36.3 (04 Mar 2025 07:58), Max: 36.8 (03 Mar 2025 18:52)  T(F): 97.3 (04 Mar 2025 07:58), Max: 98.3 (03 Mar 2025 18:52)  HR: 82 (04 Mar 2025 07:58) (68 - 88)  BP: 145/66 (04 Mar 2025 07:58) (87/52 - 157/80)  BP(mean): 92 (03 Mar 2025 18:00) (67 - 98)  RR: 18 (04 Mar 2025 07:58) (16 - 20)  SpO2: 96% (04 Mar 2025 07:58) (94% - 100%)    Parameters below as of 04 Mar 2025 07:58  Patient On (Oxygen Delivery Method): nasal cannula  O2 Flow (L/min): 3    I&O's Detail    03 Mar 2025 07:01  -  04 Mar 2025 07:00  --------------------------------------------------------  IN:    multiple electrolytes Injection Type 1 Bolus: 500 mL    Oral Fluid: 240 mL  Total IN: 740 mL    OUT:    Indwelling Catheter - Suprapubic (mL): 200 mL  Total OUT: 200 mL    Total NET: 540 mL          LABS:                        9.5    14.41 )-----------( 177      ( 04 Mar 2025 06:56 )             31.4     03-04    126[L]  |  88[L]  |  32[H]  ----------------------------<  141[H]  4.9   |  20[L]  |  4.49[H]    Ca    7.5[L]      04 Mar 2025 06:55  Phos  2.1     03-03  Mg     2.1     03-03    TPro  5.9[L]  /  Alb  2.4[L]  /  TBili  0.1[L]  /  DBili  x   /  AST  21  /  ALT  7[L]  /  AlkPhos  159[H]  03-04    LIVER FUNCTIONS - ( 04 Mar 2025 06:55 )  Alb: 2.4 g/dL / Pro: 5.9 g/dL / ALK PHOS: 159 U/L / ALT: 7 U/L / AST: 21 U/L / GGT: x           PT/INR - ( 02 Mar 2025 13:53 )   PT: 20.8 sec;   INR: 1.84 ratio         PTT - ( 03 Mar 2025 07:13 )  PTT:102.6 sec  Urinalysis Basic - ( 04 Mar 2025 06:55 )    Color: x / Appearance: x / SG: x / pH: x  Gluc: 141 mg/dL / Ketone: x  / Bili: x / Urobili: x   Blood: x / Protein: x / Nitrite: x   Leuk Esterase: x / RBC: x / WBC x   Sq Epi: x / Non Sq Epi: x / Bacteria: x    Physical Exam:  General: NAD  Neuro: Awake, alert and oriented x3  Resp: Nonlabored breathing on RA  CV: Hemodynamically stable.   GI/Abd: Soft, obese, nontender. Dressing to Q C/D/I. Suprapubic catheter in place  Vascular: Nonpalpable femoral pulses. Nonpalpable DP/PT b/l.   Extremities: BLE atrophic, minimal dorsi/plantarflexion bilaterally. Sensation diminished, LLE edematous compared to R, mottling of right toes/forefoot/heel, No active wounds bilat.

## 2025-03-04 NOTE — PROGRESS NOTE ADULT - ASSESSMENT
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 The initial plan was that he would present to the [facility] ER Monday 2/17 for HD initiation. However, day of admission, he fell and sustained trauma to his knee. Called to consult for cough, elevated R hemidiaphragm.

## 2025-03-04 NOTE — PROGRESS NOTE ADULT - ASSESSMENT
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 He is well known to me from multiple recent admisions  s/p admissions at [facility] 12/20-12/25/24 and 2/4-2/7 with SONDRA on CKD with associated uremic symptoms.   At the last admission he had expressed strong interest to try to hold off with HD intiation but he has been getting worse clinically at home.  His SONDRA and uremic symptoms significantly improved after the first admission; they improved a to mild extent during the 2nd admission to the point where he was able to be discharged without HD. Since then, however, he has worsened further.   He has been suffering from progressively worsening diffuse pruritus, loss of appetite, and generalized weakness and falls.   His nephrologist and PCP has been speaking with him and his family over the past few days,   The initial plan was that he would present to the [facility] ER Monday 2/17 (ie tomorrow) for HD initiation. Today, however, he fell and sustained trauma to his knee. Given the fall and concern for knee fracture, he presented to the ER today. multiple xrays show no Fractures.      CKD5, uremia, requiring initiation of HD  Rash, seborrheic dermatitis  Pruritis  Anemia  PAD  SP catheter, chronic  Left inferior pole acute on chronic patella Fx    plan  - HD via R subclavian permacath  3/4- s/p b/l iliac arteries angioplasty and stent placements on 3/3. today has new ecchymoses in b/l LE. wound from Left knee immobilizer is dressed and healing. [ain is controlled. LEFT UE AVF placement/scheduling as per vascular  - 3/3: ptn is s/p angiogram RLE : b/l iliac arteries w successful angioplasty and stents. in PACU. awaitng HD.  ptn has a pressure wound from the LLE immobilizer posteriorly proximal to the knee. its been on 2/2 knee fracture, applied by ortho and managed by ptn's wife as per ptn's and wife's request , this was d/w nursing and nurse manager ms Ruiz.. immobilizer should be managed by orthopedics and nursing. will keep it off, only keep on when repositioning for care and then remove. wound care to F/U . this was discussed at length w daughter Cori and wife Dee Dee.   -3/1-3/2: ptn is smiling, pain free, had HD 2/28 and 3/1, awaiting RLE angiogram 3/3, on Heparin drip, euvolemic  -2/28: ptn is lethargic, awaiting RLE angiogram possible fem-fem bypass hopefully on 3/3 pending OR availability, awaiting HD today    -2/27:  plan for angiogram in am with possible angioplasty, possible stent, possible fem-fem bypass. medically cleared for angiogram and possible surgery, stent, angioplasty. pain is controlled. remains on heparin drip as per vascular. HD as per renal    -2/26:  ptn is sleeping, pain is controlled, he was seen by wound care and vascular attending. planning on angiogram 2/2 severe PAD in LE on CTA. he also spoke to HCP. ptn and HCP in agreement. ptn was started on HEPARIN drip as per vascular recs. RIJ permacath done 2/25    - 2/25: ptn states he is hallucinating, but not at present, his MS is at baseline, his pain is controlled, he still needs prn pain meds when he is moved in the bed or for testing or for HD. awaiting Permacath, AVF creation, LE bypass to be d/w Dr. Swenson and the ptn tomorrow. ptn has cardiology clearance  if he opts for. Ptn has sacral decubiti and posterior thigh and buttock decibiti and b/l heel decubiti. Z flow fabienne d/w RN, get wound care consult    -2/24: pain is controlled  if the LLE is immobile and fully extended. doesn't tolerate the knee immobilizer. has a medium condyle and acute on chronic patella fx in LEFT knee. as per vascular ptn will need iliac stent  w a fem-fem bypass, possible axillo-femoral bypass. for this surgery he would need cardiac work up . more pressing surgery is LUE AVF creation,  in IR will arrange for Permacath. for pain control: Motrin 400 mg q12H, Oxy ER 30 mg q12H, Oxy IR 10 for mod pain and dilaudid 2 mg iv for severe pain. still has pruritis though improved, will raise atarax 25 mg to tid. plan of care d/w daughter Norma Persaud , ptn and his wife. Also d/w renal, card, vascular, ACP    -2/23: Daughter Cori is the HCP, she and the ptn filled out the paperwork. daily plan and findings d/w her and the ptn. MS is at abseline, c/o b/l LE foot pain and Left Knee pain. xrays of left knee: cannot r/o acute on chronic inferior pole patellar Fx. knee immobilizer is on, awaiting ortho consult. doubt needs any intervention awaiting Ct chest today, will add CT Left knee. ptn states itching has recurred, severe pain has recurred. will resume Atatrax ( 25 mg bid) and Oxycodone ER , but at a lower dose 15 mg q12H. cont prn analgesics. HD as per renal, awaiting Vascular consult f/u re CTA A/L &LE and recs. ptn also wants AVF surgery on this admission. Coughing has stopped    - 2/22: ptn is drowsy but arousable, calmer today, answers questions appropriately. he is pain free, he stopped coughing, he denies having pruritis: will DC:  OXY ER, Atarax, Tessalon perles.     -  2/21: ptn is tearful, a bit confused, coughing, recognizes me and family at bedside. GOC d/w daughter and wife. AMS prob delirium 2/2 acute illness +/- opiods, lyrica, atarac. will lower atarak to tid, dc lyrica, cont Oxy 2/2 ptn has severe LE pain. neuro called for eval. Head ct done yesterday w no acute findings. CTA A/P w LE run fof: severe PAD, vascular to follow up on plan fo care. plan for HD tomorrow and next week place on tiw schedule of MWF. will need tunneled catheter prior to DC and will d/w vascular scheduling of AVF. ptn wants all to be done while inptn. daughter wants to be HCP as per ptn's wishes. she was given paperwork to get it signed and witnessed and will present to nursing thereafter. details of findings and complaints and plan of care d/w daughter and wife. spent 60 min. RVP ordered. start mucinex , tessalon perles, duoneclemente, get CT chest to r/o PNA. pulm called    -  2/20:  ptn had RRT 2/2 AMS and tremors. no SZ, no LOC. noted to have Na 123( hyponatremia), given 500 cc NS. Gabapentin DCed. ptn had been taking gabapentin at home PTA. Pain is controlled. Pruritis resolved, tolerating HD otherwise.     - Pain management:  cont Oxycodone 10 IR q6H,  DIlaudid prn severe pain 2 mg q4H prn.   - cont outptn meds  - DVT ppx w HSC

## 2025-03-04 NOTE — PROGRESS NOTE ADULT - SUBJECTIVE AND OBJECTIVE BOX
Patient is a 74y old  Male who presents with a chief complaint of ESRD requiring HD, uremia (04 Mar 2025 12:35)      SUBJECTIVE / OVERNIGHT EVENTS: ptn has new ecchymoses in b/l feet, vascular to follow.     MEDICATIONS  (STANDING):  albuterol/ipratropium for Nebulization 3 milliLiter(s) Nebulizer every 6 hours  aspirin  chewable 81 milliGRAM(s) Oral daily  atorvastatin 40 milliGRAM(s) Oral at bedtime  chlorhexidine 2% Cloths 1 Application(s) Topical daily  clopidogrel Tablet 75 milliGRAM(s) Oral daily  epoetin aleah-epbx (RETACRIT) Injectable 69433 Unit(s) IV Push <User Schedule>  guaiFENesin ER 1200 milliGRAM(s) Oral every 12 hours  hydrOXYzine hydrochloride 25 milliGRAM(s) Oral three times a day  ibuprofen  Tablet. 400 milliGRAM(s) Oral every 12 hours  oxyCODONE  ER Tablet 30 milliGRAM(s) Oral every 12 hours  pantoprazole    Tablet 40 milliGRAM(s) Oral before breakfast    MEDICATIONS  (PRN):  acetaminophen     Tablet .. 650 milliGRAM(s) Oral every 6 hours PRN Temp greater or equal to 38C (100.4F), Mild Pain (1 - 3)  HYDROmorphone  Injectable 2 milliGRAM(s) IV Push every 6 hours PRN Severe Pain (7 - 10)  oxyCODONE    IR 10 milliGRAM(s) Oral every 6 hours PRN Moderate Pain (4 - 6)  sodium chloride 0.9% lock flush 10 milliLiter(s) IV Push every 1 hour PRN Pre/post blood products, medications, blood draw, and to maintain line patency      Vital Signs Last 24 Hrs  T(F): 97.5 (03-04-25 @ 16:09), Max: 98.3 (03-03-25 @ 18:52)  HR: 73 (03-04-25 @ 16:09) (70 - 96)  BP: 119/63 (03-04-25 @ 16:09) (117/54 - 145/66)  RR: 18 (03-04-25 @ 16:09) (16 - 20)  SpO2: 92% (03-04-25 @ 16:09) (92% - 100%)  Telemetry:   CAPILLARY BLOOD GLUCOSE        I&O's Summary    03 Mar 2025 07:01  -  04 Mar 2025 07:00  --------------------------------------------------------  IN: 740 mL / OUT: 200 mL / NET: 540 mL    04 Mar 2025 07:01  -  04 Mar 2025 17:35  --------------------------------------------------------  IN: 0 mL / OUT: 550 mL / NET: -550 mL        PHYSICAL EXAM:  GENERAL: NAD, well-developed  HEAD:  Atraumatic, Normocephalic  EYES: EOMI, PERRLA, conjunctiva and sclera clear  NECK: Supple, No JVD  CHEST/LUNG: Clear to auscultation bilaterally; No wheeze  HEART: Regular rate and rhythm; No murmurs, rubs, or gallops  ABDOMEN: Soft, Nontender, Nondistended; Bowel sounds present  EXTREMITIES:  2+ Peripheral Pulses, No clubbing, cyanosis, or edema  PSYCH: AAOx3  NEUROLOGY: non-focal  SKIN: No rashes or lesions    LABS:                        9.5    14.41 )-----------( 177      ( 04 Mar 2025 06:56 )             31.4     03-04    126[L]  |  88[L]  |  32[H]  ----------------------------<  141[H]  4.9   |  20[L]  |  4.49[H]    Ca    7.5[L]      04 Mar 2025 06:55  Phos  2.1     03-03  Mg     2.1     03-03    TPro  5.9[L]  /  Alb  2.4[L]  /  TBili  0.1[L]  /  DBili  x   /  AST  21  /  ALT  7[L]  /  AlkPhos  159[H]  03-04    PTT - ( 03 Mar 2025 07:13 )  PTT:102.6 sec      Urinalysis Basic - ( 04 Mar 2025 06:55 )    Color: x / Appearance: x / SG: x / pH: x  Gluc: 141 mg/dL / Ketone: x  / Bili: x / Urobili: x   Blood: x / Protein: x / Nitrite: x   Leuk Esterase: x / RBC: x / WBC x   Sq Epi: x / Non Sq Epi: x / Bacteria: x        RADIOLOGY & ADDITIONAL TESTS:    Imaging Personally Reviewed:    Consultant(s) Notes Reviewed:      Care Discussed with Consultants/Other Providers:

## 2025-03-04 NOTE — PROGRESS NOTE ADULT - PROBLEM SELECTOR PLAN 2
Nondisplaced L medial femoral condyle fx   s/p RLE angiogram w/ b/l iliac artery stents 3/3  ortho following

## 2025-03-05 NOTE — PROGRESS NOTE ADULT - PROBLEM SELECTOR PLAN 1
-CXR with elevated R hemidiaphragm (not present on CXR in December 2024)  -Low grade temp, cough  -CT chest with LLL, RLL GGO, mucoid impacted airways. No clear PNA. Monitoring off ABX per ID  -Suggest Duoneb q6h, Mucinex 1200 mg PO BID   -S/p Mucomyst x5 days   -Incentive spirometry   -Keep sats >90% with o2 PRN  -ABG 2/28 with no co2 retention  -CXR 3/1 with improved R hemidiaphragm, low lung volumes on L. Otherwise grossly clear.  -currently off oxygen: ABG two days ago was pretty good

## 2025-03-05 NOTE — CONSULT NOTE ADULT - NS ATTEND AMEND GEN_ALL_CORE FT
Pt seen and examined with ACP.  Assessment and plan reviewed and discussed.  Agree with above.    Status of wounds and treatment recommendations d/w  pt.  All questions answered.   Pt expressed understanding.    I spent 75 minutes face to face w/ this pt of which more than 50% of the time was spent counseling & coordinating care of this pt.
Pt lethargic, check abg keep pH>7.2  Check CT chest to r/o PNA, pl effusion/atelectasis.   Continue empiric abx as per ID  Keep sats >90% with o2 PRN

## 2025-03-05 NOTE — PROGRESS NOTE ADULT - PROBLEM SELECTOR PLAN 2
Nondisplaced L medial femoral condyle fx   s/p RLE angiogram w/ b/l iliac artery stents 3/3  ortho following  New ecchymoses in b/l feet, vascular to follow

## 2025-03-05 NOTE — PROGRESS NOTE ADULT - ASSESSMENT
74M with history of HTN, polio with multiple subsequent problems including LE weakness requiring wheelchair, neurogenic bladder s/p suprapubic catheter, and colostomy s/p iatrogenic rectal injury 2/2023, and CKD 5 presenting to the ED with weakness and falls. Admitted for initiation of HD now s/p R IJ shiley placement on 2/17/25 and conversion to tunneled catheter on 2/25. Vascular surgery initially consulted for AVF creation; however, patient's worsening PAD with worsening ischemic changes is the more urgent issue now s/p b/l iliac stent placement on 3/3/25.     Plan:  - no further revascularization options  - recommend podiatry consult given c/f toes, foot is ok   - c/w ASA/Plavix  - continue HD through permacath  - continue to protect nondominant left arm  - vascular surgery to follow    Vascular Surgery   [id number]   74M with history of HTN, polio with multiple subsequent problems including LE weakness requiring wheelchair, neurogenic bladder s/p suprapubic catheter, and colostomy s/p iatrogenic rectal injury 2/2023, and CKD 5 presenting to the ED with weakness and falls. Admitted for initiation of HD now s/p R IJ shiley placement on 2/17/25 and conversion to tunneled catheter on 2/25. Vascular surgery initially consulted for AVF creation; however, patient's worsening PAD with worsening ischemic changes is the more urgent issue now s/p b/l iliac stent placement on 3/3/25.     Plan:  - no further revascularization options  - recommend podiatry consult given c/f toes, foot is ok   - c/w ASA/Plavix  - continue HD through permacath, plan for AVF intervention outpatient  - continue to protect nondominant left arm  - vascular surgery to follow    Vascular Surgery   [id number]

## 2025-03-05 NOTE — PROGRESS NOTE ADULT - ASSESSMENT
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 He is well known to me from multiple recent admisions  s/p admissions at [facility] 12/20-12/25/24 and 2/4-2/7 with SONDRA on CKD with associated uremic symptoms.   At the last admission he had expressed strong interest to try to hold off with HD intiation but he has been getting worse clinically at home.  His SONDRA and uremic symptoms significantly improved after the first admission; they improved a to mild extent during the 2nd admission to the point where he was able to be discharged without HD. Since then, however, he has worsened further.   He has been suffering from progressively worsening diffuse pruritus, loss of appetite, and generalized weakness and falls.   His nephrologist and PCP has been speaking with him and his family over the past few days,   The initial plan was that he would present to the [facility] ER Monday 2/17 (ie tomorrow) for HD initiation. Today, however, he fell and sustained trauma to his knee. Given the fall and concern for knee fracture, he presented to the ER today. multiple xrays show no Fractures.      CKD5, uremia, requiring initiation of HD  Rash, seborrheic dermatitis  Pruritis  Anemia  PAD  SP catheter, chronic  Left inferior pole acute on chronic patella Fx    plan  - HD via R subclavian permacath  3/4-5 - s/p b/l iliac arteries angioplasty and stent placements on 3/3. today has new ecchymoses in b/l LE, concern for arterial insufficiency. vascular aware.. wound from Left knee immobilizer is dressed and healing. pain is controlled.   LEFT UE AVF placement/scheduling will be on outptn basis as per vascular. dc planning to [facility]  - 3/3: ptn is s/p angiogram RLE : b/l iliac arteries w successful angioplasty and stents. in PACU. awaitng HD.  ptn has a pressure wound from the LLE immobilizer posteriorly proximal to the knee. its been on 2/2 knee fracture, applied by ortho and managed by ptn's wife as per ptn's and wife's request , this was d/w nursing and nurse manager ms Ruiz.. immobilizer should be managed by orthopedics and nursing. will keep it off, only keep on when repositioning for care and then remove. wound care to F/U . this was discussed at length w daughter Cori and wife Dee Dee.   -3/1-3/2: ptn is smiling, pain free, had HD 2/28 and 3/1, awaiting RLE angiogram 3/3, on Heparin drip, euvolemic  -2/28: ptn is lethargic, awaiting RLE angiogram possible fem-fem bypass hopefully on 3/3 pending OR availability, awaiting HD today    -2/27:  plan for angiogram in am with possible angioplasty, possible stent, possible fem-fem bypass. medically cleared for angiogram and possible surgery, stent, angioplasty. pain is controlled. remains on heparin drip as per vascular. HD as per renal    -2/26:  ptn is sleeping, pain is controlled, he was seen by wound care and vascular attending. planning on angiogram 2/2 severe PAD in LE on CTA. he also spoke to HCP. ptn and HCP in agreement. ptn was started on HEPARIN drip as per vascular recs. RIJ permacath done 2/25    - 2/25: ptn states he is hallucinating, but not at present, his MS is at baseline, his pain is controlled, he still needs prn pain meds when he is moved in the bed or for testing or for HD. awaiting Permacath, AVF creation, LE bypass to be d/w Dr. Swenson and the ptn tomorrow. ptn has cardiology clearance  if he opts for. Ptn has sacral decubiti and posterior thigh and buttock decibiti and b/l heel decubiti. Z flow fabienne d/w RN, get wound care consult    -2/24: pain is controlled  if the LLE is immobile and fully extended. doesn't tolerate the knee immobilizer. has a medium condyle and acute on chronic patella fx in LEFT knee. as per vascular ptn will need iliac stent  w a fem-fem bypass, possible axillo-femoral bypass. for this surgery he would need cardiac work up . more pressing surgery is LUE AVF creation,  in IR will arrange for Permacath. for pain control: Motrin 400 mg q12H, Oxy ER 30 mg q12H, Oxy IR 10 for mod pain and dilaudid 2 mg iv for severe pain. still has pruritis though improved, will raise atarax 25 mg to tid. plan of care d/w daughter Norma Persaud , ptn and his wife. Also d/w renal, card, vascular, ACP    -2/23: Daughter Cori is the HCP, she and the ptn filled out the paperwork. daily plan and findings d/w her and the ptn. MS is at abseline, c/o b/l LE foot pain and Left Knee pain. xrays of left knee: cannot r/o acute on chronic inferior pole patellar Fx. knee immobilizer is on, awaiting ortho consult. doubt needs any intervention awaiting Ct chest today, will add CT Left knee. ptn states itching has recurred, severe pain has recurred. will resume Atatrax ( 25 mg bid) and Oxycodone ER , but at a lower dose 15 mg q12H. cont prn analgesics. HD as per renal, awaiting Vascular consult f/u re CTA A/L &LE and recs. ptn also wants AVF surgery on this admission. Coughing has stopped    - 2/22: ptn is drowsy but arousable, calmer today, answers questions appropriately. he is pain free, he stopped coughing, he denies having pruritis: will DC:  OXY ER, Atarax, Tessalon perles.     -  2/21: ptn is tearful, a bit confused, coughing, recognizes me and family at bedside. GOC d/w daughter and wife. AMS prob delirium 2/2 acute illness +/- opiods, lyrica, atarac. will lower atarak to tid, dc lyrica, cont Oxy 2/2 ptn has severe LE pain. neuro called for eval. Head ct done yesterday w no acute findings. CTA A/P w LE run fof: severe PAD, vascular to follow up on plan fo care. plan for HD tomorrow and next week place on tiw schedule of MWF. will need tunneled catheter prior to DC and will d/w vascular scheduling of AVF. ptn wants all to be done while inptn. daughter wants to be HCP as per ptn's wishes. she was given paperwork to get it signed and witnessed and will present to nursing thereafter. details of findings and complaints and plan of care d/w daughter and wife. spent 60 min. RVP ordered. start mucinex , tessalon perles, duonebs, get CT chest to r/o PNA. pulm called    -  2/20:  ptn had RRT 2/2 AMS and tremors. no SZ, no LOC. noted to have Na 123( hyponatremia), given 500 cc NS. Gabapentin DCed. ptn had been taking gabapentin at home PTA. Pain is controlled. Pruritis resolved, tolerating HD otherwise.     - Pain management:  cont Oxycodone 10 IR q6H,  DIlaudid prn severe pain 2 mg q4H prn.   - cont outptn meds  - DVT ppx w HSC

## 2025-03-05 NOTE — PROGRESS NOTE ADULT - ASSESSMENT
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  came in after fall and for HD.     2/20 CTH neg   \Na 123 --> now 133   A1c 5.7   TSH WNL 3.46   o/e 2/21 AAOx2, uppers 4/5, lowers limited .  2/23; family bedside upset that he has pain in leg after he was moved.  patient going for imaging now.   s/p R IJ permacath by IR 2/25 2/27 AAOx3     Imrpssion  1) AMS, waxing and waing , likely multifactorial from infection, metabolic/electrolyte derrangements/ delerium / medication effect , ureemia which is imporving   2) polio  3) fall 2/2 weakness  4) hyponatremia to 123 2/20  improved 133 -->136    - monitor puleses in distal LE; f/u vascular    - eventually AVF  - f/u vascular; no vascluar options   - on DAPT   - was getting oxycodone ER 30mg BID and oxy PRN IR i10 and dilauded PRN ;   - f/u psych   - limit sedating meds   - continue to monitor and correct metabolic derrangements .  - delerium precuations.   - frequent re orientation  - check b12, RPR, ammonia level   - will consdier MRI brain or EEG if doesn't improve but seems to have been improving   - PT/OT   - check FS, glucose control <180  - GI/DVT ppx  - Thank you for allowing me to participate in the care of this patient. Call with questions.    spoke with wife 2/27   Paul Limon MD  Vascular Neurology  Office: [phone]

## 2025-03-05 NOTE — PROGRESS NOTE ADULT - SUBJECTIVE AND OBJECTIVE BOX
[facility]  SABINO Griffin Y. Patel, S. Shah, G. Casimir  [phone]  ([phone] - weekdays after 5pm and weekends)    Name: BRIAN DEMPSEY  Age/Gender: 74y Male  MRN: [id number]    Interval History:  Patient seen and examined this morning.   Resting comfortably.   Notes reviewed  No concerning overnight events  Afebrile   Allergies: No Known Allergies      Objective:  Vitals:   T(F): 97.7 (03-04-25 @ 23:31), Max: 97.7 (03-04-25 @ 23:31)  HR: 85 (03-04-25 @ 23:31) (73 - 96)  BP: 142/67 (03-04-25 @ 23:31) (117/54 - 142/67)  RR: 18 (03-04-25 @ 23:31) (18 - 18)  SpO2: 99% (03-04-25 @ 23:31) (92% - 99%)  Physical Examination:  General: no acute distress, RA  HEENT: normocephalic, atraumatic  Respiratory: no acc muscle use, breathing comfortably  Cardiovascular: S1 and S2 present  Gastrointestinal: normal appearing, nondistended  Extremities: b/l feet/toes cyanotic, warm to touch    Laboratory Studies:  CBC:                       9.5    14.41 )-----------( 177      ( 04 Mar 2025 06:56 )             31.4     WBC Trend:  14.41 03-04-25 @ 06:56  11.77 03-03-25 @ 07:13  12.22 03-03-25 @ 01:12  16.50 03-02-25 @ 07:28  10.79 03-01-25 @ 07:02  9.29 02-28-25 @ 09:56  9.60 02-27-25 @ 05:43  8.81 02-26-25 @ 21:22    CMP: 03-04    126[L]  |  88[L]  |  32[H]  ----------------------------<  141[H]  4.9   |  20[L]  |  4.49[H]    Ca    7.5[L]      04 Mar 2025 06:55    TPro  5.9[L]  /  Alb  2.4[L]  /  TBili  0.1[L]  /  DBili  x   /  AST  21  /  ALT  7[L]  /  AlkPhos  159[H]  03-04    Creatinine: 4.49 mg/dL (03-04-25 @ 06:55)  Creatinine: 4.49 mg/dL (03-04-25 @ 06:55)  Creatinine: 4.11 mg/dL (03-03-25 @ 01:12)  Creatinine: 5.69 mg/dL (03-01-25 @ 07:01)    LIVER FUNCTIONS - ( 04 Mar 2025 06:55 )  Alb: 2.4 g/dL / Pro: 5.9 g/dL / ALK PHOS: 159 U/L / ALT: 7 U/L / AST: 21 U/L / GGT: x           Microbiology: reviewed   Culture - Blood (collected 02-20-25 @ 01:55)  Source: .Blood Blood-Peripheral  Final Report (02-25-25 @ 04:00):    No growth at 5 days    Radiology: reviewed     Medications:  acetaminophen     Tablet .. 650 milliGRAM(s) Oral every 6 hours PRN  albuterol/ipratropium for Nebulization 3 milliLiter(s) Nebulizer every 6 hours  aspirin  chewable 81 milliGRAM(s) Oral daily  atorvastatin 40 milliGRAM(s) Oral at bedtime  bisacodyl 5 milliGRAM(s) Oral every 12 hours PRN  chlorhexidine 2% Cloths 1 Application(s) Topical daily  clopidogrel Tablet 75 milliGRAM(s) Oral daily  epoetin aleah-epbx (RETACRIT) Injectable 72264 Unit(s) IV Push <User Schedule>  guaiFENesin ER 1200 milliGRAM(s) Oral every 12 hours  HYDROmorphone  Injectable 2 milliGRAM(s) IV Push every 6 hours PRN  hydrOXYzine hydrochloride 25 milliGRAM(s) Oral three times a day  ibuprofen  Tablet. 400 milliGRAM(s) Oral every 12 hours  oxyCODONE    IR 10 milliGRAM(s) Oral every 6 hours PRN  oxyCODONE  ER Tablet 30 milliGRAM(s) Oral every 12 hours  pantoprazole    Tablet 40 milliGRAM(s) Oral before breakfast  polyethylene glycol 3350 17 Gram(s) Oral daily  senna 2 Tablet(s) Oral at bedtime  sodium chloride 0.9% lock flush 10 milliLiter(s) IV Push every 1 hour PRN    Prior/Completed Antimicrobials:  piperacillin/tazobactam IVPB.  piperacillin/tazobactam IVPB.-  vancomycin  IVPB

## 2025-03-05 NOTE — PROGRESS NOTE ADULT - PROBLEM SELECTOR PLAN 4
-Per vascular  -S/p b/l iliac stent placement on 3/3/25  -the toes are still purplish : reviewed vascular notes: ? podiatry consult

## 2025-03-05 NOTE — PROGRESS NOTE ADULT - SUBJECTIVE AND OBJECTIVE BOX
SUBJECTIVE: Yesterday, concern over appearance of feet/toes. Stable per vascular surgery exam.   Patient seen and examined on AM rounds.     Vital Signs Last 24 Hrs  T(C): 36.5 (04 Mar 2025 23:31), Max: 36.5 (04 Mar 2025 23:31)  T(F): 97.7 (04 Mar 2025 23:31), Max: 97.7 (04 Mar 2025 23:31)  HR: 85 (04 Mar 2025 23:31) (73 - 85)  BP: 142/67 (04 Mar 2025 23:31) (119/63 - 142/67)  BP(mean): --  RR: 18 (04 Mar 2025 23:31) (18 - 18)  SpO2: 99% (04 Mar 2025 23:31) (92% - 99%)    Parameters below as of 04 Mar 2025 23:31  Patient On (Oxygen Delivery Method): nasal cannula  O2 Flow (L/min): 2      I&O's Detail    04 Mar 2025 07:01  -  05 Mar 2025 07:00  --------------------------------------------------------  IN:  Total IN: 0 mL    OUT:    Indwelling Catheter - Suprapubic (mL): 550 mL    Other (mL): 0 mL  Total OUT: 550 mL    Total NET: -550 mL        Physical Exam:  General: NAD  Neuro: Awake, alert and oriented x3  Resp: Nonlabored breathing  CV: Hemodynamically stable.   GI/Abd: Soft, obese, nontender. Dressing to LLQ C/D/I. Suprapubic catheter in place  Vascular: Nonpalpable femoral pulses. Nonpalpable/lack of signals DP/PT b/l.   Extremities: BLE atrophic, minimal dorsi/plantarflexion bilaterally. Sensation diminished, LLE edematous compared to R, mottling of right toes/forefoot/heel, No active wounds bilat.      LABS:                        7.9    13.86 )-----------( 183      ( 05 Mar 2025 10:21 )             26.3     03-05    131[L]  |  94[L]  |  23  ----------------------------<  115[H]  4.1   |  26  |  3.28[H]    Ca    7.0[L]      05 Mar 2025 10:21    TPro  5.9[L]  /  Alb  2.4[L]  /  TBili  0.1[L]  /  DBili  x   /  AST  21  /  ALT  7[L]  /  AlkPhos  159[H]  03-04      Urinalysis Basic - ( 05 Mar 2025 10:21 )    Color: x / Appearance: x / SG: x / pH: x  Gluc: 115 mg/dL / Ketone: x  / Bili: x / Urobili: x   Blood: x / Protein: x / Nitrite: x   Leuk Esterase: x / RBC: x / WBC x   Sq Epi: x / Non Sq Epi: x / Bacteria: x        RADIOLOGY & ADDITIONAL STUDIES:

## 2025-03-05 NOTE — PROGRESS NOTE ADULT - SUBJECTIVE AND OBJECTIVE BOX
Date of Service: 03-05-25 @ 15:23    Patient is a 74y old  Male who presents with a chief complaint of ESRD requiring HD, uremia (05 Mar 2025 15:18)      Any change in ROS: lying down supine in bed:   no oxygen : has elo leg pain : mild today     MEDICATIONS  (STANDING):  albuterol/ipratropium for Nebulization 3 milliLiter(s) Nebulizer every 6 hours  aspirin  chewable 81 milliGRAM(s) Oral daily  atorvastatin 40 milliGRAM(s) Oral at bedtime  chlorhexidine 2% Cloths 1 Application(s) Topical daily  clopidogrel Tablet 75 milliGRAM(s) Oral daily  epoetin aleah-epbx (RETACRIT) Injectable 99574 Unit(s) IV Push <User Schedule>  guaiFENesin ER 1200 milliGRAM(s) Oral every 12 hours  hydrOXYzine hydrochloride 25 milliGRAM(s) Oral three times a day  ibuprofen  Tablet. 400 milliGRAM(s) Oral every 12 hours  oxyCODONE  ER Tablet 30 milliGRAM(s) Oral every 12 hours  pantoprazole    Tablet 40 milliGRAM(s) Oral before breakfast  polyethylene glycol 3350 17 Gram(s) Oral daily  senna 2 Tablet(s) Oral at bedtime    MEDICATIONS  (PRN):  acetaminophen     Tablet .. 650 milliGRAM(s) Oral every 6 hours PRN Temp greater or equal to 38C (100.4F), Mild Pain (1 - 3)  bisacodyl 5 milliGRAM(s) Oral every 12 hours PRN Constipation  HYDROmorphone  Injectable 2 milliGRAM(s) IV Push every 6 hours PRN Severe Pain (7 - 10)  oxyCODONE    IR 10 milliGRAM(s) Oral every 6 hours PRN Moderate Pain (4 - 6)  sodium chloride 0.9% lock flush 10 milliLiter(s) IV Push every 1 hour PRN Pre/post blood products, medications, blood draw, and to maintain line patency    Vital Signs Last 24 Hrs  T(C): 36.5 (04 Mar 2025 23:31), Max: 36.5 (04 Mar 2025 23:31)  T(F): 97.7 (04 Mar 2025 23:31), Max: 97.7 (04 Mar 2025 23:31)  HR: 85 (04 Mar 2025 23:31) (73 - 85)  BP: 142/67 (04 Mar 2025 23:31) (119/63 - 142/67)  BP(mean): --  RR: 18 (04 Mar 2025 23:31) (18 - 18)  SpO2: 99% (04 Mar 2025 23:31) (92% - 99%)    Parameters below as of 04 Mar 2025 23:31  Patient On (Oxygen Delivery Method): nasal cannula  O2 Flow (L/min): 2      I&O's Summary    04 Mar 2025 07:01  -  05 Mar 2025 07:00  --------------------------------------------------------  IN: 0 mL / OUT: 550 mL / NET: -550 mL          Physical Exam:   GENERAL: NAD, well-groomed, well-developed  HEENT: VINNY/   Atraumatic, Normocephalic  ENMT: No tonsillar erythema, exudates, or enlargement; Moist mucous membranes, Good dentition, No lesions  NECK: Supple, No JVD, Normal thyroid  CHEST/LUNG: Clear to auscultaion  CVS: Regular rate and rhythm; No murmurs, rubs, or gallops  GI: : Soft, Nontender, Nondistended; Bowel sounds present  NERVOUS SYSTEM:  Alert & Oriented X3  EXTREMITIES:  slight purplish toes  elo legs  LYMPH: No lymphadenopathy noted  SKIN: No rashes or lesions  ENDOCRINOLOGY: No Thyromegaly  PSYCH: Appropriate    Labs:                              7.9    13.86 )-----------( 183      ( 05 Mar 2025 10:21 )             26.3                         9.5    14.41 )-----------( 177      ( 04 Mar 2025 06:56 )             31.4                         8.5    11.77 )-----------( 193      ( 03 Mar 2025 07:13 )             28.5                         7.9    12.22 )-----------( 189      ( 03 Mar 2025 01:12 )             26.3                         8.9    16.50 )-----------( 220      ( 02 Mar 2025 07:28 )             29.5     03-05    131[L]  |  94[L]  |  23  ----------------------------<  115[H]  4.1   |  26  |  3.28[H]  03-04    126[L]  |  88[L]  |  32[H]  ----------------------------<  141[H]  4.9   |  20[L]  |  4.49[H]  03-03    129[L]  |  92[L]  |  25[H]  ----------------------------<  161[H]  3.9   |  25  |  4.11[H]    Ca    7.0[L]      05 Mar 2025 10:21  Ca    7.5[L]      04 Mar 2025 06:55    TPro  5.9[L]  /  Alb  2.4[L]  /  TBili  0.1[L]  /  DBili  x   /  AST  21  /  ALT  7[L]  /  AlkPhos  159[H]  03-04    CAPILLARY BLOOD GLUCOSE          LIVER FUNCTIONS - ( 04 Mar 2025 06:55 )  Alb: 2.4 g/dL / Pro: 5.9 g/dL / ALK PHOS: 159 U/L / ALT: 7 U/L / AST: 21 U/L / GGT: x             Urinalysis Basic - ( 05 Mar 2025 10:21 )    Color: x / Appearance: x / SG: x / pH: x  Gluc: 115 mg/dL / Ketone: x  / Bili: x / Urobili: x   Blood: x / Protein: x / Nitrite: x   Leuk Esterase: x / RBC: x / WBC x   Sq Epi: x / Non Sq Epi: x / Bacteria: x      rad< from: Xray Chest 1 View- PORTABLE-Urgent (Xray Chest 1 View- PORTABLE-Urgent .) (03.01.25 @ 22:48) >  COMPARISON: Chest x-ray 2/20/2025.    FINDINGS:  Right-sided tunneled hemodialysis catheter terminates in the SVC.  The heart is not accurately assessed in this AP projection.  The lungs are clear.  There is no pneumothorax or pleural effusion.  No acute bony abnormality.    IMPRESSION:  Clear lungs.    --- End of Report ---          GILBERT LEON MD; Resident Radiologist  This document has been electronically signed.   JESUS HALEY DO; Attending Interventional Radiologist  This document has been electronically signed. Mar  2 2025  9:37AM    < end of copied text >  < from: IR Procedure (02.25.25 @ 15:19) >    IMPRESSION:    Insertion of right-sided tunneled dialysis catheter, with tip in the   expected location of the cavoatrial junction.    Plan:    The catheter may be used immediately.    < end of copied text >        RECENT CULTURES:        RESPIRATORY CULTURES:          Studies  Chest X-RAY  CT SCAN Chest   Venous Dopplers: LE:   CT Abdomen  Others

## 2025-03-05 NOTE — PROGRESS NOTE ADULT - SUBJECTIVE AND OBJECTIVE BOX
Neurology      S; patient seen. no neuro changes         Medications: MEDICATIONS  (STANDING):  albuterol/ipratropium for Nebulization 3 milliLiter(s) Nebulizer every 6 hours  aspirin  chewable 81 milliGRAM(s) Oral daily  atorvastatin 40 milliGRAM(s) Oral at bedtime  chlorhexidine 2% Cloths 1 Application(s) Topical daily  clopidogrel Tablet 75 milliGRAM(s) Oral daily  epoetin aleah-epbx (RETACRIT) Injectable 89391 Unit(s) IV Push <User Schedule>  guaiFENesin ER 1200 milliGRAM(s) Oral every 12 hours  hydrOXYzine hydrochloride 25 milliGRAM(s) Oral three times a day  ibuprofen  Tablet. 400 milliGRAM(s) Oral every 12 hours  oxyCODONE  ER Tablet 30 milliGRAM(s) Oral every 12 hours  pantoprazole    Tablet 40 milliGRAM(s) Oral before breakfast  polyethylene glycol 3350 17 Gram(s) Oral daily  senna 2 Tablet(s) Oral at bedtime    MEDICATIONS  (PRN):  acetaminophen     Tablet .. 650 milliGRAM(s) Oral every 6 hours PRN Temp greater or equal to 38C (100.4F), Mild Pain (1 - 3)  bisacodyl 5 milliGRAM(s) Oral every 12 hours PRN Constipation  HYDROmorphone  Injectable 2 milliGRAM(s) IV Push every 6 hours PRN Severe Pain (7 - 10)  oxyCODONE    IR 10 milliGRAM(s) Oral every 6 hours PRN Moderate Pain (4 - 6)  sodium chloride 0.9% lock flush 10 milliLiter(s) IV Push every 1 hour PRN Pre/post blood products, medications, blood draw, and to maintain line patency       Vitals:  Vital Signs Last 24 Hrs  T(C): 36.7 (05 Mar 2025 15:48), Max: 36.7 (05 Mar 2025 15:48)  T(F): 98 (05 Mar 2025 15:48), Max: 98 (05 Mar 2025 15:48)  HR: 85 (05 Mar 2025 15:48) (85 - 85)  BP: 144/72 (05 Mar 2025 15:48) (142/67 - 144/72)  BP(mean): --  RR: 18 (05 Mar 2025 15:48) (18 - 18)  SpO2: 94% (05 Mar 2025 15:48) (94% - 99%)    Parameters below as of 05 Mar 2025 15:48  Patient On (Oxygen Delivery Method): room air              General Appearance: Appropriately dressed and in no acute distress       Head: Normocephalic, atraumatic and no dysmorphic features  Ear, Nose, and Throat: Moist mucous membranes  CVS: S1S2+  Resp: No SOB, no wheeze or rhonchi  GI: soft NT/ND  Extremities: LE PVD : dusky toes noted   Skin: + decub      Neurological Exam:  Mental Status: Awake, alert and oriented x 2.  Able to follow simple and complex verbal commands. Able to name and repeat. fluent speech. No obvious aphasia or dysarthria noted.   Cranial Nerves: PERRL, EOMI, VFFC, sensation V1-V3 intact,  no obvious facial asymmetry, equal elevation of palate, scm/trap 5/5, tongue is midline on protrusion. no obvious papilledema on fundoscopic exam. hearing is grossly intact.   Motor: Normal bulk, tone and strength throughout uppers 4/ lowers 0-/1 5   Sensation: Intact to light touch and pinprick throughout.     Coordination: No dysmetria on FNF   Gait: deferred, wheelchair bound     Data/Labs/Imaging which I personally reviewed.        LABS:    LABS:                          7.9    13.86 )-----------( 183      ( 05 Mar 2025 10:21 )             26.3     03-05    131[L]  |  94[L]  |  23  ----------------------------<  115[H]  4.1   |  26  |  3.28[H]    Ca    7.0[L]      05 Mar 2025 10:21    TPro  5.9[L]  /  Alb  2.4[L]  /  TBili  0.1[L]  /  DBili  x   /  AST  21  /  ALT  7[L]  /  AlkPhos  159[H]  03-04    LIVER FUNCTIONS - ( 04 Mar 2025 06:55 )  Alb: 2.4 g/dL / Pro: 5.9 g/dL / ALK PHOS: 159 U/L / ALT: 7 U/L / AST: 21 U/L / GGT: x             Urinalysis Basic - ( 05 Mar 2025 10:21 )    Color: x / Appearance: x / SG: x / pH: x  Gluc: 115 mg/dL / Ketone: x  / Bili: x / Urobili: x   Blood: x / Protein: x / Nitrite: x   Leuk Esterase: x / RBC: x / WBC x   Sq Epi: x / Non Sq Epi: x / Bacteria: x            < from: CT Head No Cont (02.20.25 @ 18:47) >    ACC: [id number] EXAM:  CT BRAIN   ORDERED BY: GUY DE LA CRUZ     PROCEDURE DATE:  02/20/2025          INTERPRETATION:  CLINICAL INDICATION: Altered mental status    TECHNIQUE: Axial CT scanning of the brain was obtained from the skull   base to the vertex without the administration of intravenous contrast.   Reformatted coronal and sagittal images were subsequently obtained and   reviewed.    COMPARISON: None    FINDINGS:  There is no CT evidence of acute transcortical infarct. Age-related   involutional changes and chronic microvascular ischemic changes.    There is no hydrocephalus, mass effect, or acute intracranial hemorrhage.   No extra-axial collection. Basal cisterns are patent.    The visualized paranasal sinuses and mastoid air cells are clear.    The calvarium is intact.    IMPRESSION:  No evidence of acute transcortical infarct, acute intracranial   hemorrhage, or mass effect.    --- End of Report ---            CORTNEY REARDON MD; Attending Radiologist  This document has been electronically signed. Feb 20 2025  7:07PM    < end of copied text >

## 2025-03-05 NOTE — PROGRESS NOTE ADULT - SUBJECTIVE AND OBJECTIVE BOX
Patient is a 74y old  Male who presents with a chief complaint of ESRD requiring HD, uremia (05 Mar 2025 11:54)      SUBJECTIVE / OVERNIGHT EVENTS: no new events    MEDICATIONS  (STANDING):  albuterol/ipratropium for Nebulization 3 milliLiter(s) Nebulizer every 6 hours  aspirin  chewable 81 milliGRAM(s) Oral daily  atorvastatin 40 milliGRAM(s) Oral at bedtime  chlorhexidine 2% Cloths 1 Application(s) Topical daily  clopidogrel Tablet 75 milliGRAM(s) Oral daily  epoetin aleah-epbx (RETACRIT) Injectable 29253 Unit(s) IV Push <User Schedule>  guaiFENesin ER 1200 milliGRAM(s) Oral every 12 hours  hydrOXYzine hydrochloride 25 milliGRAM(s) Oral three times a day  ibuprofen  Tablet. 400 milliGRAM(s) Oral every 12 hours  oxyCODONE  ER Tablet 30 milliGRAM(s) Oral every 12 hours  pantoprazole    Tablet 40 milliGRAM(s) Oral before breakfast  polyethylene glycol 3350 17 Gram(s) Oral daily  senna 2 Tablet(s) Oral at bedtime    MEDICATIONS  (PRN):  acetaminophen     Tablet .. 650 milliGRAM(s) Oral every 6 hours PRN Temp greater or equal to 38C (100.4F), Mild Pain (1 - 3)  bisacodyl 5 milliGRAM(s) Oral every 12 hours PRN Constipation  HYDROmorphone  Injectable 2 milliGRAM(s) IV Push every 6 hours PRN Severe Pain (7 - 10)  oxyCODONE    IR 10 milliGRAM(s) Oral every 6 hours PRN Moderate Pain (4 - 6)  sodium chloride 0.9% lock flush 10 milliLiter(s) IV Push every 1 hour PRN Pre/post blood products, medications, blood draw, and to maintain line patency      Vital Signs Last 24 Hrs  T(F): 97.7 (03-04-25 @ 23:31), Max: 97.7 (03-04-25 @ 23:31)  HR: 85 (03-04-25 @ 23:31) (73 - 85)  BP: 142/67 (03-04-25 @ 23:31) (119/63 - 142/67)  RR: 18 (03-04-25 @ 23:31) (18 - 18)  SpO2: 99% (03-04-25 @ 23:31) (92% - 99%)  Telemetry:   CAPILLARY BLOOD GLUCOSE        I&O's Summary    04 Mar 2025 07:01  -  05 Mar 2025 07:00  --------------------------------------------------------  IN: 0 mL / OUT: 550 mL / NET: -550 mL        PHYSICAL EXAM:  GENERAL: NAD, well-developed  HEAD:  Atraumatic, Normocephalic  EYES: EOMI, PERRLA, conjunctiva and sclera clear  NECK: Supple, No JVD  CHEST/LUNG: Clear to auscultation bilaterally; No wheeze  HEART: Regular rate and rhythm; No murmurs, rubs, or gallops  ABDOMEN: Soft, Nontender, Nondistended; Bowel sounds present  EXTREMITIES:  2+ Peripheral Pulses, No clubbing, cyanosis, or edema  PSYCH: AAOx3  NEUROLOGY: non-focal  SKIN: No rashes or lesions    LABS:                        7.9    13.86 )-----------( 183      ( 05 Mar 2025 10:21 )             26.3     03-05    131[L]  |  94[L]  |  23  ----------------------------<  115[H]  4.1   |  26  |  3.28[H]    Ca    7.0[L]      05 Mar 2025 10:21    TPro  5.9[L]  /  Alb  2.4[L]  /  TBili  0.1[L]  /  DBili  x   /  AST  21  /  ALT  7[L]  /  AlkPhos  159[H]  03-04          Urinalysis Basic - ( 05 Mar 2025 10:21 )    Color: x / Appearance: x / SG: x / pH: x  Gluc: 115 mg/dL / Ketone: x  / Bili: x / Urobili: x   Blood: x / Protein: x / Nitrite: x   Leuk Esterase: x / RBC: x / WBC x   Sq Epi: x / Non Sq Epi: x / Bacteria: x        RADIOLOGY & ADDITIONAL TESTS:    Imaging Personally Reviewed:    Consultant(s) Notes Reviewed:      Care Discussed with Consultants/Other Providers:

## 2025-03-05 NOTE — CONSULT NOTE ADULT - SUBJECTIVE AND OBJECTIVE BOX
Wound SURGERY CONSULT NOTE    HPI:  74 year-old man with history of multiple medical issues including polio with associated neurogenic bladder/suprapubic catheter, iatrogenic rectal rupture requiring colostomy 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 He is well known to me from multiple recent admisions  s/p admissions at [facility] 12/20-12/25/24 and 2/4-2/7 with SONDRA on CKD with associated uremic symptoms.   At the last admission he had expressed strong interest to try to hold off with HD intiation but he has been getting worse clinically at home.  His SONDRA and uremic symptoms significantly improved after the first admission; they improved a to mild extent during the 2nd admission to the point where he was able to be discharged without HD. Since then, however, he has worsened further.   He has been suffering from progressively worsening diffuse pruritus, loss of appetite, and generalized weakness and falls.   His nephrologist and PCP has been speaking with him and his family over the past few days,   The initial plan was that he would present to the [facility] ER Monday 2/17 for HD initiation, however, he fell and sustained trauma to his knee. Given the fall and concern for knee fracture, he presented to the ER today. multiple xrays show no Fractures.   Admission complicated by   cellulitis around suprapubic cath= ruled out  2/20 s/p RRT for tremors and confusion -- pt with chronic tremors although notably worse  hyponatremia - Renal following for CKD/ HD  mrsa negative   2/20 CXR with clear lungs   SPC site with chronic/stable inflammatory changes, no drainage - no sign of SSTI  CTA abd with occlusion of b/l external iliac arteries and b/l superficial femoral arteries with distal reconstitution at popliteal arteries; patent three vessel runoff of RLE with 2 vessel runoff of LLE with occlusion of L mid anterior tibial artery with distal reconstitution  Bcx negative to date   mental status at baseline  WBC noted post op, remains afebrile     Admitted for initiation of HD now s/p R IJ shiley placement on 2/17/25 and conversion to tunneled catheter on 2/25. However, patient's worsening PAD with worsening ischemic changes is the more urgent issue now s/p b/l iliac stent placement on 3/3/25.      Wound consult requested by team to assist w/ management of multip     wound/ pressure injury.   Pt (unable to)  c/o pain, drainage, odor, color change,  or worsening swelling. Offloading and pericare initiated upon admission as pt Increasingly sedentary 2/2 to illness. Pt is Incontinent of urine & stool. (+)mcconnell/ ostomy.   No h/o bites, scratches, falls, trauma.  Pt seen by Wound RN  CAVILON Advance/  Roberto,TRIAD/ Julia/ medihoney/ Allevyn foam/ dakins/ Adaptic/ DSD recommended used at home/ while awaiting consult.  Appetite good/ decreased.  weight loss.  S&S / RD consult appreciated All questions asked and answered to pt's and family's expressed understanding and satisfaction.    Current Diet: Diet, Renal Restrictions:   For patients receiving Renal Replacement - No Protein Restr, No Conc K, No Conc Phos, Low Sodium  1000mL Fluid Restriction ([id number]) (03-03-25 @ 14:37)      PAST MEDICAL & SURGICAL HISTORY:  Polio    Hypertension    HLD (hyperlipidemia)    BPH with obstruction/lower urinary tract symptoms    Erectile dysfunction    Bladder outlet obstruction  s/p Suprapubic catheter    DVT, lower extremity    cardiac murmur    S/P Ex Lap, Enterotomy repair, colostomy    S/P BLE ORIF (open reduction internal fixation) fracture    S/P Rt Cataract surgery    s/p shoulder surgery      REVIEW OF SYSTEMS: General/  Skin/ Vasc/ MSK: see HPI  All other systems negative    MEDICATIONS  (STANDING):  albuterol/ipratropium for Nebulization 3 milliLiter(s) Nebulizer every 6 hours  aspirin  chewable 81 milliGRAM(s) Oral daily  atorvastatin 40 milliGRAM(s) Oral at bedtime  chlorhexidine 2% Cloths 1 Application(s) Topical daily  clopidogrel Tablet 75 milliGRAM(s) Oral daily  epoetin aleah-epbx (RETACRIT) Injectable 40523 Unit(s) IV Push <User Schedule>  guaiFENesin ER 1200 milliGRAM(s) Oral every 12 hours  hydrOXYzine hydrochloride 25 milliGRAM(s) Oral three times a day  ibuprofen  Tablet. 400 milliGRAM(s) Oral every 12 hours  oxyCODONE  ER Tablet 30 milliGRAM(s) Oral every 12 hours  pantoprazole    Tablet 40 milliGRAM(s) Oral before breakfast  polyethylene glycol 3350 17 Gram(s) Oral daily  senna 2 Tablet(s) Oral at bedtime    MEDICATIONS  (PRN):  acetaminophen  Tablet 650 milliGRAM(s) Oral every 6 hours PRN Temp greater or equal to 38C (100.4F), Mild Pain (1 - 3)  bisacodyl 5 milliGRAM(s) Oral every 12 hours PRN Constipation  HYDROmorphone  Injectable 2 milliGRAM(s) IV Push every 6 hours PRN Severe Pain (7 - 10)  oxyCODONE    IR 10 milliGRAM(s) Oral every 6 hours PRN Moderate Pain (4 - 6)  sodium chloride 0.9% lock flush 10 milliLiter(s) IV Push every 1 hour PRN Pre/post blood products, medications, blood draw, and to maintain line patency      No Known Allergies      SOCIAL HISTORY:  /Denies smoking, ETOH, drugs    FAMILY HISTORY: type 2 diabetes   no h/o PVD or wound healing or skinproblems    PHYSICAL EXAM:  Vital Signs Last 24 Hrs  T(C): 36.7 (05 Mar 2025 15:48), Max: 36.7 (05 Mar 2025 15:48)  T(F): 98 (05 Mar 2025 15:48), Max: 98 (05 Mar 2025 15:48)  HR: 85 (05 Mar 2025 15:48) (85 - 85)  BP: 144/72 (05 Mar 2025 15:48) (142/67 - 144/72)  BP(mean): --  RR: 18 (05 Mar 2025 15:48) (18 - 18)  SpO2: 94% (05 Mar 2025 15:48) (94% - 99%)    Parameters below as of 05 Mar 2025 15:48  Patient On (Oxygen Delivery Method): room air    NAD,  A&Ox3, Obese, WD/ WN/ W  Versa Care P500 bed  HEENT:  NC/AT, EOMI, sclera clear, mucosa moist, throat clear, trachea midline, neck supple  Respiratory: nonlabored w/ equal chest rise  Gastrointestinal: soft NT/ND (+)colostomy pink / viable   : (+) superpubic cath     Scrotum w/ hyperpigmented / purple toned amorphic skin changes 2cm x 5cm      no blistering or drainage  No odor, erythema, increased warmth, tenderness, induration, fluctuance, nor crepitus  Neurology:  weakened strength & sensation grossly intact  Psych: calm/ appropriate  Musculoskeletal: FROM, no deformities/ contractures  Vascular: BLE equally warm,  no cyanosis, clubbing      (+)BLE edema equal       no BLE DP/PT pulses palpable       BLE extensive mottling and purple skin changes of ischemia including purple/ black skin changes on heels       no blistering or drainage  No odor, erythema, increased warmth, tenderness, induration, fluctuance, nor crepitus   Skin: thin, dry, pale, frail,  ecchymosis w/o hematoma  Sacrum into Lt buttocks into perineum evolving Unstageable pressure injury     black dry eschar w/ deep purple and pink denuded skin changes peripherally     16cm x 16cm x 0.1cm  Rt Ischium unstageable pressure injury     brown/ grey slough w/ denuded pink skin changes     pt w/ excoriated skin changes in periwound buttocks area     2cm x 4cm x0.1cm  Lt Buttocks evolving DTI w/ deep purple maroon and pink denuded skin changes     2cm x 12cm      no blistering or drainage  No odor, erythema, increased warmth, tenderness, induration, fluctuance, nor crepitus    Lt upper thigh wrapping posteriorly w/ 2 wounds    Upper posterior thigh 4cm x 8cmx  0.1cm black eschar w/ denuded skin changes   inferiorly starting anterior medially and wrapping around posterior        4cm x 20cm purple / maroon skin changes w/ blistering  Lt Knee(8cm x 9cm) & Lateral upper calf (7cm x 6cm) and Lt calf to ankle (20cm x 8cm)     w/ purple/ maroon amorphic skin changes    blistering but no open skin or drainage  No odor, erythema, increased warmth, tenderness, induration, fluctuance, nor crepitus    LABS/ CULTURES/ RADIOLOGY:                        7.9    13.86 )-----------( 183      ( 05 Mar 2025 10:21 )             26.3       131  |  94  |  23  ----------------------------<  115      [03-05-25 @ 10:21]  4.1   |  26  |  3.28        Ca     7.0     [03-05-25 @ 10:21]      Phos  2.1     [03-03-25 @ 01:12]    TPro  5.9  /  Alb  2.4  /  TBili  0.1  /  DBili  x   /  AST  21  /  ALT  7   /  AlkPhos  159  [03-04-25 @ 06:55]        A1C with Estimated Average Glucose Result: 5.7 % (12-21-24 @ 06:44)  A1C with Estimated Average Glucose Result: 4.9 % (10-09-24 @ 13:30)   Wound SURGERY CONSULT NOTE    HPI:  74 year-old man with history of multiple medical issues including polio with associated neurogenic bladder/suprapubic catheter, iatrogenic rectal rupture requiring colostomy 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 He is well known to me from multiple recent admisions  s/p admissions at [facility] 12/20-12/25/24 and 2/4-2/7 with SONDRA on CKD with associated uremic symptoms.   At the last admission he had expressed strong interest to try to hold off with HD intiation but he has been getting worse clinically at home.  His SODNRA and uremic symptoms significantly improved after the first admission; they improved a to mild extent during the 2nd admission to the point where he was able to be discharged without HD. Since then, however, he has worsened further.   He has been suffering from progressively worsening diffuse pruritus, loss of appetite, and generalized weakness and falls.   His nephrologist and PCP has been speaking with him and his family over the past few days,   The initial plan was that he would present to the [facility] ER Monday 2/17 for HD initiation, however, he fell and sustained trauma to his knee. Given the fall and concern for knee fracture, he presented to the ER today. multiple xrays show no Fractures.   Admission complicated by   cellulitis around suprapubic cath= ruled out  2/20 s/p RRT for tremors and confusion -- pt with chronic tremors although notably worse  hyponatremia - Renal following for CKD/ HD  mrsa negative   2/20 CXR with clear lungs   SPC site with chronic/stable inflammatory changes, no drainage - no sign of SSTI  CTA abd with occlusion of b/l external iliac arteries and b/l superficial femoral arteries with distal reconstitution at popliteal arteries; patent three vessel runoff of RLE with 2 vessel runoff of LLE with occlusion of L mid anterior tibial artery with distal reconstitution  Bcx negative to date   mental status at baseline  WBC noted post op, remains afebrile     Admitted for initiation of HD now s/p R IJ shiley placement on 2/17/25 and conversion to tunneled catheter on 2/25. However, patient's worsening PAD with worsening ischemic changes is the more urgent issue now s/p b/l iliac stent placement on 3/3/25.      Wound consult requested by team to assist w/ management of multiple wounds. Pt c/o pain, but unable to c/o drainage, odor, color change, or worsening swelling. Offloading and pericare initiated upon admission as pt Increasingly sedentary 2/2 to illness. Pt w/ (+)suprapubic cath & ostomy. No h/o bites, scratches, falls, trauma. Pt seen by Wound RN as wounds evolving- wound consult requested. Vascular & ID consult appreciated. Appetite so/so w/o weight loss. RD consult appreciated All questions asked and answered to pt's expressed understanding and satisfaction.    Current Diet: Diet, Renal Restrictions:   For patients receiving Renal Replacement - No Protein Restr, No Conc K, No Conc Phos, Low Sodium  1000mL Fluid Restriction ([id number]) (03-03-25 @ 14:37)      PAST MEDICAL & SURGICAL HISTORY:  Polio    Hypertension    HLD (hyperlipidemia)    BPH with obstruction/lower urinary tract symptoms    Erectile dysfunction    Bladder outlet obstruction  s/p Suprapubic catheter    DVT, lower extremity    cardiac murmur    S/P Ex Lap, Enterotomy repair, colostomy    S/P BLE ORIF (open reduction internal fixation) fracture    S/P Rt Cataract surgery    s/p shoulder surgery      REVIEW OF SYSTEMS: General/  Skin/ Vasc/ MSK: see HPI  All other systems negative    MEDICATIONS  (STANDING):  albuterol/ipratropium for Nebulization 3 milliLiter(s) Nebulizer every 6 hours  aspirin  chewable 81 milliGRAM(s) Oral daily  atorvastatin 40 milliGRAM(s) Oral at bedtime  chlorhexidine 2% Cloths 1 Application(s) Topical daily  clopidogrel Tablet 75 milliGRAM(s) Oral daily  epoetin aleah-epbx (RETACRIT) Injectable 03765 Unit(s) IV Push <User Schedule>  guaiFENesin ER 1200 milliGRAM(s) Oral every 12 hours  hydrOXYzine hydrochloride 25 milliGRAM(s) Oral three times a day  ibuprofen  Tablet. 400 milliGRAM(s) Oral every 12 hours  oxyCODONE  ER Tablet 30 milliGRAM(s) Oral every 12 hours  pantoprazole    Tablet 40 milliGRAM(s) Oral before breakfast  polyethylene glycol 3350 17 Gram(s) Oral daily  senna 2 Tablet(s) Oral at bedtime    MEDICATIONS  (PRN):  acetaminophen  Tablet 650 milliGRAM(s) Oral every 6 hours PRN Temp greater or equal to 38C (100.4F), Mild Pain (1 - 3)  bisacodyl 5 milliGRAM(s) Oral every 12 hours PRN Constipation  HYDROmorphone  Injectable 2 milliGRAM(s) IV Push every 6 hours PRN Severe Pain (7 - 10)  oxyCODONE    IR 10 milliGRAM(s) Oral every 6 hours PRN Moderate Pain (4 - 6)  sodium chloride 0.9% lock flush 10 milliLiter(s) IV Push every 1 hour PRN Pre/post blood products, medications, blood draw, and to maintain line patency      No Known Allergies      SOCIAL HISTORY:  /Denies smoking, ETOH, drugs    FAMILY HISTORY: type 2 diabetes   no h/o PVD or wound healing or skinproblems    PHYSICAL EXAM:  Vital Signs Last 24 Hrs  T(C): 36.7 (05 Mar 2025 15:48), Max: 36.7 (05 Mar 2025 15:48)  T(F): 98 (05 Mar 2025 15:48), Max: 98 (05 Mar 2025 15:48)  HR: 85 (05 Mar 2025 15:48) (85 - 85)  BP: 144/72 (05 Mar 2025 15:48) (142/67 - 144/72)  BP(mean): --  RR: 18 (05 Mar 2025 15:48) (18 - 18)  SpO2: 94% (05 Mar 2025 15:48) (94% - 99%)    Parameters below as of 05 Mar 2025 15:48  Patient On (Oxygen Delivery Method): room air    NAD,  A&Ox3, Obese, WD/ WN/ W  [facility]a Care P500 bed  HEENT:  NC/AT, EOMI, sclera clear, mucosa moist, throat clear, trachea midline, neck supple  Respiratory: nonlabored w/ equal chest rise  Gastrointestinal: soft NT/ND (+)colostomy pink / viable   : (+) superpubic cath     Scrotum w/ hyperpigmented / purple toned amorphic skin changes 2cm x 5cm      no blistering or drainage  No odor, erythema, increased warmth, tenderness, induration, fluctuance, nor crepitus  Neurology:  weakened strength & sensation grossly intact  Psych: calm/ appropriate  Musculoskeletal: FROM, no deformities/ contractures  Vascular: BLE equally warm,  no cyanosis, clubbing      (+)BLE edema equal       no BLE DP/PT pulses palpable       BLE extensive mottling and purple skin changes of ischemia including purple/ black skin changes on heels       no blistering or drainage  No odor, erythema, increased warmth, tenderness, induration, fluctuance, nor crepitus   Skin: thin, dry, pale, frail,  ecchymosis w/o hematoma  Sacrum into Lt buttocks into perineum evolving Unstageable pressure injury     black dry eschar w/ deep purple and pink denuded skin changes peripherally     16cm x 16cm x 0.1cm  Rt Ischium unstageable pressure injury     brown/ grey slough w/ denuded pink skin changes     pt w/ excoriated skin changes in periwound buttocks area     2cm x 4cm x0.1cm  Lt Buttocks evolving DTI w/ deep purple maroon and pink denuded skin changes     2cm x 12cm      no blistering or drainage  No odor, erythema, increased warmth, tenderness, induration, fluctuance, nor crepitus    Lt upper thigh wrapping posteriorly w/ 2 wounds    Upper posterior thigh 4cm x 8cmx  0.1cm black eschar w/ denuded skin changes   inferiorly starting anterior medially and wrapping around posterior        4cm x 20cm purple / maroon skin changes w/ blistering  Lt Knee(8cm x 9cm) & Lateral upper calf (7cm x 6cm) and Lt calf to ankle (20cm x 8cm)     w/ purple/ maroon amorphic skin changes    blistering but no open skin or drainage  No odor, erythema, increased warmth, tenderness, induration, fluctuance, nor crepitus    LABS/ CULTURES/ RADIOLOGY:                        7.9    13.86 )-----------( 183      ( 05 Mar 2025 10:21 )             26.3       131  |  94  |  23  ----------------------------<  115      [03-05-25 @ 10:21]  4.1   |  26  |  3.28        Ca     7.0     [03-05-25 @ 10:21]      Phos  2.1     [03-03-25 @ 01:12]    TPro  5.9  /  Alb  2.4  /  TBili  0.1  /  DBili  x   /  AST  21  /  ALT  7   /  AlkPhos  159  [03-04-25 @ 06:55]        A1C with Estimated Average Glucose Result: 5.7 % (12-21-24 @ 06:44)  A1C with Estimated Average Glucose Result: 4.9 % (10-09-24 @ 13:30)   Wound SURGERY CONSULT NOTE    HPI:  74 year-old man with history of multiple medical issues including polio with associated neurogenic bladder/suprapubic catheter, iatrogenic rectal rupture requiring colostomy 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 He is well known to me from multiple recent admisions  s/p admissions at [facility] 12/20-12/25/24 and 2/4-2/7 with SONDRA on CKD with associated uremic symptoms.   At the last admission he had expressed strong interest to try to hold off with HD intiation but he has been getting worse clinically at home.  His SONDRA and uremic symptoms significantly improved after the first admission; they improved a to mild extent during the 2nd admission to the point where he was able to be discharged without HD. Since then, however, he has worsened further.   He has been suffering from progressively worsening diffuse pruritus, loss of appetite, and generalized weakness and falls.   His nephrologist and PCP has been speaking with him and his family over the past few days,   The initial plan was that he would present to the [facility] ER Monday 2/17 for HD initiation, however, he fell and sustained trauma to his knee. Given the fall and concern for knee fracture, he presented to the ER today. multiple xrays show no Fractures.   Admission complicated by   cellulitis around suprapubic cath= ruled out  2/20 s/p RRT for tremors and confusion -- pt with chronic tremors although notably worse  hyponatremia - Renal following for CKD/ HD  mrsa negative   2/20 CXR with clear lungs   SPC site with chronic/stable inflammatory changes, no drainage - no sign of SSTI  CTA abd with occlusion of b/l external iliac arteries and b/l superficial femoral arteries with distal reconstitution at popliteal arteries; patent three vessel runoff of RLE with 2 vessel runoff of LLE with occlusion of L mid anterior tibial artery with distal reconstitution  Bcx negative to date   mental status at baseline  WBC noted post op, remains afebrile     Admitted for initiation of HD now s/p R IJ shiley placement on 2/17/25 and conversion to tunneled catheter on 2/25. However, patient's worsening PAD with worsening ischemic changes is the more urgent issue now s/p b/l iliac stent placement on 3/3/25.      Wound consult requested by team to assist w/ management of multiple wounds. Pt c/o pain, but unable to c/o drainage, odor, color change, or worsening swelling. Offloading and pericare initiated upon admission as pt Increasingly sedentary 2/2 to illness. Pt w/ (+)suprapubic cath & ostomy. No h/o bites, scratches, falls, trauma. Pt seen by Wound RN as wounds evolving- wound consult requested. Vascular & ID consult appreciated. Appetite so/so w/o weight loss. RD consult appreciated All questions asked and answered to pt's expressed understanding and satisfaction.    Current Diet: Diet, Renal Restrictions:   For patients receiving Renal Replacement - No Protein Restr, No Conc K, No Conc Phos, Low Sodium  1000mL Fluid Restriction ([id number]) (03-03-25 @ 14:37)      PAST MEDICAL & SURGICAL HISTORY:  Polio    Hypertension    HLD (hyperlipidemia)    BPH with obstruction/lower urinary tract symptoms    Erectile dysfunction    Bladder outlet obstruction  s/p Suprapubic catheter    DVT, lower extremity    cardiac murmur    S/P Ex Lap, Enterotomy repair, colostomy    S/P BLE ORIF (open reduction internal fixation) fracture    S/P Rt Cataract surgery    s/p shoulder surgery      REVIEW OF SYSTEMS: General/  Skin/ Vasc/ MSK: see HPI  All other systems negative    MEDICATIONS  (STANDING):  albuterol/ipratropium for Nebulization 3 milliLiter(s) Nebulizer every 6 hours  aspirin  chewable 81 milliGRAM(s) Oral daily  atorvastatin 40 milliGRAM(s) Oral at bedtime  chlorhexidine 2% Cloths 1 Application(s) Topical daily  clopidogrel Tablet 75 milliGRAM(s) Oral daily  epoetin aleah-epbx (RETACRIT) Injectable 24580 Unit(s) IV Push <User Schedule>  guaiFENesin ER 1200 milliGRAM(s) Oral every 12 hours  hydrOXYzine hydrochloride 25 milliGRAM(s) Oral three times a day  ibuprofen  Tablet. 400 milliGRAM(s) Oral every 12 hours  oxyCODONE  ER Tablet 30 milliGRAM(s) Oral every 12 hours  pantoprazole    Tablet 40 milliGRAM(s) Oral before breakfast  polyethylene glycol 3350 17 Gram(s) Oral daily  senna 2 Tablet(s) Oral at bedtime    MEDICATIONS  (PRN):  acetaminophen  Tablet 650 milliGRAM(s) Oral every 6 hours PRN Temp greater or equal to 38C (100.4F), Mild Pain (1 - 3)  bisacodyl 5 milliGRAM(s) Oral every 12 hours PRN Constipation  HYDROmorphone  Injectable 2 milliGRAM(s) IV Push every 6 hours PRN Severe Pain (7 - 10)  oxyCODONE    IR 10 milliGRAM(s) Oral every 6 hours PRN Moderate Pain (4 - 6)  sodium chloride 0.9% lock flush 10 milliLiter(s) IV Push every 1 hour PRN Pre/post blood products, medications, blood draw, and to maintain line patency      No Known Allergies      SOCIAL HISTORY:  /Denies smoking, ETOH, drugs    FAMILY HISTORY: type 2 diabetes   no h/o PVD or wound healing or skinproblems    PHYSICAL EXAM:  Vital Signs Last 24 Hrs  T(C): 36.7 (05 Mar 2025 15:48), Max: 36.7 (05 Mar 2025 15:48)  T(F): 98 (05 Mar 2025 15:48), Max: 98 (05 Mar 2025 15:48)  HR: 85 (05 Mar 2025 15:48) (85 - 85)  BP: 144/72 (05 Mar 2025 15:48) (142/67 - 144/72)  BP(mean): --  RR: 18 (05 Mar 2025 15:48) (18 - 18)  SpO2: 94% (05 Mar 2025 15:48) (94% - 99%)    Parameters below as of 05 Mar 2025 15:48  Patient On (Oxygen Delivery Method): room air    NAD,  A&Ox3, Obese, WD/ WN/ W  Versa Care P500 bed  HEENT:  NC/AT, EOMI, sclera clear, mucosa moist, throat clear, trachea midline, neck supple  Respiratory: nonlabored w/ equal chest rise  Gastrointestinal: soft NT/ND (+)colostomy pink / viable   : (+) superpubic cath     Scrotum w/ hyperpigmented / purple toned amorphic skin changes 2cm x 5cm      no blistering or drainage  No odor, erythema, increased warmth, tenderness, induration, fluctuance, nor crepitus  Neurology:  weakened strength & sensation grossly intact  Psych: calm/ appropriate  Musculoskeletal: FROM, no deformities/ contractures  Vascular: BLE equally warm/cool,  no clubbing      (+)BLE edema equal       no BLE DP/PT pulses palpable       BLE extensive mottling and purple skin changes of ischemia including purple/ black skin changes on heels       no blistering or drainage  No odor, erythema, increased warmth, tenderness, induration, fluctuance, nor crepitus   Skin: thin, dry, pale, frail,  ecchymosis w/o hematoma  Sacrum into Lt buttocks into perineum evolving Unstageable pressure injury     black dry eschar w/ deep purple and pink denuded skin changes peripherally     16cm x 16cm x 0.1cm  Rt Ischium unstageable pressure injury     brown/ grey slough w/ denuded pink skin changes     pt w/ excoriated skin changes in periwound buttocks area     2cm x 4cm x0.1cm  Lt Buttocks evolving DTI w/ deep purple maroon and pink denuded skin changes     2cm x 12cm      no blistering or drainage  No odor, erythema, increased warmth, tenderness, induration, fluctuance, nor crepitus    Lt upper thigh wrapping posteriorly w/ 2 wounds    Upper posterior thigh 4cm x 8cmx  0.1cm black eschar w/ denuded skin changes   inferiorly starting anterior medially and wrapping around posterior        4cm x 20cm purple / maroon skin changes w/ blistering  Lt Knee(8cm x 9cm) & Lateral upper calf (7cm x 6cm) and Lt calf to ankle (20cm x 8cm)     w/ purple/ maroon amorphic skin changes    blistering but no open skin or drainage  No odor, erythema, increased warmth, tenderness, induration, fluctuance, nor crepitus    LABS/ CULTURES/ RADIOLOGY:                        7.9    13.86 )-----------( 183      ( 05 Mar 2025 10:21 )             26.3       131  |  94  |  23  ----------------------------<  115      [03-05-25 @ 10:21]  4.1   |  26  |  3.28        Ca     7.0     [03-05-25 @ 10:21]      Phos  2.1     [03-03-25 @ 01:12]    TPro  5.9  /  Alb  2.4  /  TBili  0.1  /  DBili  x   /  AST  21  /  ALT  7   /  AlkPhos  159  [03-04-25 @ 06:55]        A1C with Estimated Average Glucose Result: 5.7 % (12-21-24 @ 06:44)  A1C with Estimated Average Glucose Result: 4.9 % (10-09-24 @ 13:30)

## 2025-03-05 NOTE — PROGRESS NOTE ADULT - ASSESSMENT
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 The initial plan was that he would present to the [facility] ER Monday 2/17 for HD initiation. However, day of admission, he fell and sustained trauma to his knee. Called to consult for cough, elevated R hemidiaphragm.

## 2025-03-05 NOTE — PROGRESS NOTE ADULT - ASSESSMENT
74 year old male with CKD, sp polio, paraplegia with associated neurogenic bladder s/p suprapubic catheter who was admitted s/p fall on 2/16.   CKD - ESRD, now s/p DEYA [place] 2/17, on HD  ruled out cellulitis around suprapubic cath  2/20 s/p RRT for tremors and confusion -- pt with chronic tremors although notably worse  hyponatremia    mrsa negative   2/20 CXR with clear lungs   SPC site with chronic/stable inflammatory changes, no drainage - no sign of SSTI  CTA abd with occlusion of b/l external iliac arteries and b/l superficial femoral arteries with distal reconstitution at popliteal arteries; patent three vessel runoff of RLE with 2 vessel runoff of LLE with occlusion of L mid anterior tibial artery with distal reconstitution  Bcx negative to date   mental status at baseline  WBC noted post op, remains afebrile   Vascular following for worsening PAD with worsening ischemic changes   -3/3 s/p RLE angiogram b/l iliac artery stents     s/p vancomycin x1 2/20  s/p zosyn 2/20-2/22      Recommendations:   Continue off antibiotics   Vascular surgery following  HD per renal    Monitor temps/WBC  Continue rest of care per primary team       Bridgette Ramirez M.D.  [facility]  Available on [employer] TEAMS - *PREFERRED*  [phone]  After 5pm on weekdays and all day on weekends - please call [phone]     Thank you for consulting us and involving us in the management of this patients case. In addition to reviewing history, imaging, documents, labs, microbiology, took into account antibiotic stewardship, local antibiogram and infection control strategies and potential transmission issues at time of treatment decision making process.

## 2025-03-05 NOTE — PROGRESS NOTE ADULT - SUBJECTIVE AND OBJECTIVE BOX
Subjective: Patient seen and examined. No new events except as noted.   Patient s/p Iliac stent bypass on 3/3 now with worsening cyanotic toes bilaterally.     REVIEW OF SYSTEMS:    CONSTITUTIONAL: No weakness, fevers or chills  EYES/ENT: No visual changes;  No vertigo or throat pain   NECK: No pain or stiffness  RESPIRATORY: No cough, wheezing, hemoptysis; No shortness of breath  CARDIOVASCULAR: No chest pain or palpitations  GASTROINTESTINAL: No abdominal or epigastric pain. No nausea, vomiting, or hematemesis; No diarrhea or constipation. No melena or hematochezia.  GENITOURINARY: No dysuria, frequency or hematuria  NEUROLOGICAL: No numbness or weakness  SKIN: No itching, burning, rashes, or lesions   All other review of systems is negative unless indicated above.    MEDICATIONS:  MEDICATIONS  (STANDING):  albuterol/ipratropium for Nebulization 3 milliLiter(s) Nebulizer every 6 hours  aspirin  chewable 81 milliGRAM(s) Oral daily  atorvastatin 40 milliGRAM(s) Oral at bedtime  chlorhexidine 2% Cloths 1 Application(s) Topical daily  clopidogrel Tablet 75 milliGRAM(s) Oral daily  epoetin aleah-epbx (RETACRIT) Injectable 19466 Unit(s) IV Push <User Schedule>  guaiFENesin ER 1200 milliGRAM(s) Oral every 12 hours  hydrOXYzine hydrochloride 25 milliGRAM(s) Oral three times a day  ibuprofen  Tablet. 400 milliGRAM(s) Oral every 12 hours  oxyCODONE  ER Tablet 30 milliGRAM(s) Oral every 12 hours  pantoprazole    Tablet 40 milliGRAM(s) Oral before breakfast  polyethylene glycol 3350 17 Gram(s) Oral daily  senna 2 Tablet(s) Oral at bedtime      PHYSICAL EXAM:  T(C): 36.5 (03-04-25 @ 23:31), Max: 36.5 (03-04-25 @ 23:31)  HR: 85 (03-04-25 @ 23:31) (73 - 85)  BP: 142/67 (03-04-25 @ 23:31) (119/63 - 142/67)  RR: 18 (03-04-25 @ 23:31) (18 - 18)  SpO2: 99% (03-04-25 @ 23:31) (92% - 99%)  Wt(kg): --  I&O's Summary    04 Mar 2025 07:01  -  05 Mar 2025 07:00  --------------------------------------------------------  IN: 0 mL / OUT: 550 mL / NET: -550 mL          Appearance: NAD  HEENT:  Dry oral mucosa, PERRL, EOMI	  Lymphatic: No lymphadenopathy  Cardiovascular: Normal S1 S2, No JVD, No murmurs, No edema  Respiratory: Lungs clear to auscultation	  Psychiatry: A & O x 3, Mood & affect appropriate  Skin: No rashes, No ecchymoses, No cyanosis	  Neurologic: Non-focal  GI/Abd: Soft, obese, nontender. Dressing to Q C/D/I. Suprapubic catheter in place  Ext: Palp femoral pulses bilat. BLE atrophic, minimal dorsi/plantarflexion bilaterally. Sensation grossly intact. LLE edematous compared to R, erythema to right toes/forefoot. mottling of right toes/forefoot/heel  LLE:  small superficial abrasion, +edema and +ecchymosis over L knee  +TTP over L knee, no TTP along remainder of extremity; compartments soft  Limited ROM at knee 2/2 pain  No gross varus/valgus laxity, but assessment limited 2/2 pain  Motor: TA/EHL/GS/FHL intact  Sensory: DP/SP/Tib/Jordan/Saph SILT  +DP pulse (symmetric relative to contralateral side), WWP   pressure wound from the LLE immobilizer posteriorly proximal to the knee.  Vascular: Peripheral pulses palpable 2+ bilaterally        LABS:    CARDIAC MARKERS:                                7.9    13.86 )-----------( 183      ( 05 Mar 2025 10:21 )             26.3     03-05    131[L]  |  94[L]  |  23  ----------------------------<  115[H]  4.1   |  26  |  3.28[H]    Ca    7.0[L]      05 Mar 2025 10:21    TPro  5.9[L]  /  Alb  2.4[L]  /  TBili  0.1[L]  /  DBili  x   /  AST  21  /  ALT  7[L]  /  AlkPhos  159[H]  03-04    proBNP:   Lipid Profile:   HgA1c:   TSH:             TELEMETRY: 	    ECG:  	  RADIOLOGY:   DIAGNOSTIC TESTING:  [ ] Echocardiogram:  [ ]  Catheterization:  [ ] Stress Test:    OTHER:

## 2025-03-05 NOTE — CONSULT NOTE ADULT - ASSESSMENT
74M with history of HTN, polio with multiple subsequent problems including LE weakness requiring wheelchair, neurogenic bladder s/p suprapubic catheter, and colostomy s/p iatrogenic rectal injury 2/2023, and CKD 5 presenting to the ED with weakness and falls. Admitted for initiation of HD now s/p R IJ shiley placement on 2/17/25 and conversion to tunneled catheter on 2/25. Vascular surgery initially consulted for AVF creation; however, patient's worsening PAD with worsening ischemic changes is the more urgent issue now s/p b/l iliac stent placement on 3/3/25.     Wound Consult requested to assist w/ management of Sacral and Ischial Unstageable Pressure Injury  BLE severe PAD w/ extensive wounds      Buttocks/ Sacrum/ Thighs TRIAD BID and prn soiling        Continue w/ attends under pads and Pericare as per protocol  B LEG wounds/ skin changes CAVILON QD  Appreciate Vascular input- continue to follow up  Consider f/u w/ Podiatry for feet  BLE elevation & Compression  Abx per Medicine/ ID  Moisturize intact skin w/ SWEEN cream BID  Nutrition Consult for optimization in pt w/ Moderate Protein Calorie Malnutrition,        encourage high quality protein, sandy/ prosource, MVI & Vit C to promote wound healing  Continue turning and positioning w/ offloading assistive devices as per protocol  Waffle Cushion to chair when oob to chair  Continue w/ low air loss pressure redistribution bed surface   Pt will need Group 2 mattress on hospit al bed and ROHO cushion for wheel chair upon discharge home  Care as per medicine, will follow w/ you  Upon discharge f/u as outpatient at [facility] [address] [facility] [phone]  Seen w/ attng & RN and D/w team  Thank you for this consult  Danica Humphreys PA-C CWS [id number]  Nights/ Weekends/ Holidays please call:  General Surgery Consult pager [phone]) for emergencies  Wound PT for multilayer leg wrapping or VAC issues ([phone])   I spent 75minutes face to face w/ this pt of which more than 50% of the time was spent counseling & coordinating care of this pt.  74M with history of HTN, polio with multiple subsequent problems including LE weakness requiring wheelchair, neurogenic bladder s/p suprapubic catheter, and colostomy s/p iatrogenic rectal injury 2/2023, and CKD 5 presenting to the ED with weakness and falls. Admitted for initiation of HD now s/p R IJ shiley placement on 2/17/25 and conversion to tunneled catheter on 2/25. Vascular surgery initially consulted for AVF creation; however, patient's worsening PAD with worsening ischemic changes is the more urgent issue now s/p b/l iliac stent placement on 3/3/25.     Wound Consult requested to assist w/ management of Sacral and Ischial Unstageable Pressure Injury  BLE severe PAD w/ extensive wounds      Buttocks/ Sacrum/ Thighs TRIAD BID and prn soiling        Continue w/ attends under pads and Pericare as per protocol  B LEG wounds/ skin changes CAVILON QD  Appreciate Vascular input- continue to follow up  Consider f/u w/ Podiatry for feet  BLE elevation & Compression  Abx per Medicine/ ID  Consider Palliative for GOC and pain mngt  Moisturize intact skin w/ SWEEN cream BID  Nutrition Consult for optimization in pt w/ Moderate Protein Calorie Malnutrition,        encourage high quality protein, sandy/ prosource, MVI & Vit C to promote wound healing  Continue turning and positioning w/ offloading assistive devices as per protocol  Waffle Cushion to chair when oob to chair  Continue w/ low air loss pressure redistribution bed surface   Pt will need Group 2 mattress on hospit al bed and ROHO cushion for wheel chair upon discharge home  Care as per medicine, will follow w/ you  Upon discharge f/u as outpatient at Wound Center [address] [place] [address] [phone]  Seen w/ attng & RN and D/w team  Thank you for this consult  Danica Humphreys PA-C CWS [id number]  Nights/ Weekends/ Holidays please call:  General Surgery Consult pager [phone]) for emergencies  Wound PT for multilayer leg wrapping or VAC issues ([phone])   I spent 75minutes face to face w/ this pt of which more than 50% of the time was spent counseling & coordinating care of this pt.  74M with history of HTN, polio with multiple subsequent problems including LE weakness requiring wheelchair, neurogenic bladder s/p suprapubic catheter, and colostomy s/p iatrogenic rectal injury 2/2023, and CKD 5 presenting to the ED with weakness and falls. Admitted for initiation of HD now s/p R IJ shiley placement on 2/17/25 and conversion to tunneled catheter on 2/25. Vascular surgery initially consulted for AVF creation; however, patient's worsening PAD with worsening ischemic changes is the more urgent issue now s/p b/l iliac stent placement on 3/3/25.     Wound Consult requested to assist w/ management of:  Sacral and Rt Ischial Unstageable Pressure Injury  Left Buttock DTI  BLE severe PAD w/ extensive wounds      Buttocks/ Sacrum/ Thighs TRIAD BID and prn soiling        Continue w/ attends under pads and Pericare as per protocol  BL LEG wounds/ skin changes CAVILON QD  Appreciate Vascular input- continue to follow up  Consider f/u w/ Podiatry for feet  BLE elevation & Compression  Abx per Medicine/ ID  Consider Palliative for GOC and pain mngt  Moisturize intact skin w/ SWEEN cream BID  Nutrition Consult for optimization in pt w/ Moderate Protein Calorie Malnutrition,        encourage high quality protein, sandy/ prosource, MVI & Vit C to promote wound healing  Continue turning and positioning w/ offloading assistive devices as per protocol  Waffle Cushion to chair when oob to chair  Continue w/ low air loss pressure redistribution bed surface   Pt will need Group 2 mattress on hospital al bed and ROHO cushion for wheel chair upon discharge home  Care as per medicine, will follow w/ you  Upon discharge f/u as outpatient at [facility] [address] [facility] [phone]  Seen w/ attng & RN and D/w team  Thank you for this consult  Danica Humphreys PA-C CWS [id number]  Nights/ Weekends/ Holidays please call:  General Surgery Consult pager [phone]) for emergencies  Wound PT for multilayer leg wrapping or VAC issues (x [phone])

## 2025-03-05 NOTE — PROGRESS NOTE ADULT - SUBJECTIVE AND OBJECTIVE BOX
Overnight events noted      VITAL:  T(C): , Max: 36.5 (03-04-25 @ 23:31)  T(F): , Max: 97.7 (03-04-25 @ 23:31)  HR: 85 (03-04-25 @ 23:31)  BP: 142/67 (03-04-25 @ 23:31)  BP(mean): --  RR: 18 (03-04-25 @ 23:31)  SpO2: 99% (03-04-25 @ 23:31)  Wt(kg): --      PHYSICAL EXAM:  Constitutional: lethargic, NAD  HEENT: NCAT, DMM  Neck: Supple, No JVD  Respiratory: CTA-b/l  Cardiovascular: RRR s1s2, no m/r/g  Gastrointestinal: BS+, soft, NT/ND  Extremities: No peripheral edema b/l  Neurological: reduced generalized strength  Back: no CVAT b/l  Skin: (+)violaceous changes 1st/2nd toes of R foot  Access: [facility] tunneled cath      LABS:                        9.5    14.41 )-----------( 177      ( 04 Mar 2025 06:56 )             31.4     Na(126)/K(4.9)/Cl(88)/HCO3(20)/BUN(32)/Cr(4.49)Glu(141)/Ca(7.5)/Mg(--)/PO4(--)    03-04 @ 06:55  Na(129)/K(3.9)/Cl(92)/HCO3(25)/BUN(25)/Cr(4.11)Glu(161)/Ca(8.0)/Mg(2.1)/PO4(2.1)    03-03 @ 01:12        IMPRESSION: 74M w/ polio, neurogenic bladder/suprapubic catheter, 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 Last dialyzed yesterday; we can plan to hold off with further HD until Friday 3/7/25, and in turn get him back onto a MWF schedule    (2)Hyponatremia - indicated for a high-Na bath with HD from this point forward. Should be improved as of today, s/p HD yesterday.    (3)Hypophosphatemia - now off PO4 binders    (4)Anemia - s/p IV iron; on Retacrit with HD    (5)PAD - POD#2 s/p LE bypass      RECOMMEND:   (1)No objection to discharge with next HD Friday 3/7 as outpatient - high-Na+ bath; minimal UF; Retacrit with HD            Rafita Denny MD  [facility]  Office/on call physician: [phone]  Cell (7a-7p): [phone]       Overnight events noted      VITAL:  T(C): , Max: 36.5 (03-04-25 @ 23:31)  T(F): , Max: 97.7 (03-04-25 @ 23:31)  HR: 85 (03-04-25 @ 23:31)  BP: 142/67 (03-04-25 @ 23:31)  BP(mean): --  RR: 18 (03-04-25 @ 23:31)  SpO2: 99% (03-04-25 @ 23:31)  Wt(kg): --      PHYSICAL EXAM:  Constitutional: lethargic, NAD  HEENT: NCAT, DMM  Neck: Supple, No JVD  Respiratory: CTA-b/l  Cardiovascular: RRR s1s2, no m/r/g  Gastrointestinal: BS+, soft, NT/ND  Extremities: No peripheral edema b/l  Neurological: reduced generalized strength  Back: no CVAT b/l  Skin: (+)cyanotic changes of toes b/l feet  Access: [facility] tunneled cath      LABS:                        9.5    14.41 )-----------( 177      ( 04 Mar 2025 06:56 )             31.4     Na(126)/K(4.9)/Cl(88)/HCO3(20)/BUN(32)/Cr(4.49)Glu(141)/Ca(7.5)/Mg(--)/PO4(--)    03-04 @ 06:55  Na(129)/K(3.9)/Cl(92)/HCO3(25)/BUN(25)/Cr(4.11)Glu(161)/Ca(8.0)/Mg(2.1)/PO4(2.1)    03-03 @ 01:12        IMPRESSION: 74M w/ polio, neurogenic bladder/suprapubic catheter, 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 Last dialyzed yesterday; we can plan to hold off with further HD until Friday 3/7/25, and in turn get him back onto a MWF schedule    (2)Hyponatremia - indicated for a high-Na bath with HD from this point forward. Should be improved as of today, s/p HD yesterday.    (3)Hypophosphatemia - now off PO4 binders    (4)Anemia - s/p IV iron; on Retacrit with HD    (5)PAD - POD#2 s/p LE bypass - cyanotic changes of toes b/l      RECOMMEND:   (1)Further management of PAD per Vascular  (2)Will tentatively plan to hold off with next HD until Friday 3/7, to get him back on MWF schedule          Rafita Denny MD  [facility]  Office/on call physician: [phone]  Cell (7a-7p): [phone]

## 2025-03-06 NOTE — PROGRESS NOTE ADULT - SUBJECTIVE AND OBJECTIVE BOX
Overnight events noted      VITAL:  T(C): , Max: 36.7 (03-05-25 @ 15:48)  T(F): , Max: 98 (03-05-25 @ 15:48)  HR: 84 (03-05-25 @ 23:50)  BP: 112/64 (03-05-25 @ 23:50)  BP(mean): --  RR: 18 (03-05-25 @ 23:50)  SpO2: 95% (03-05-25 @ 23:50)  Wt(kg): --      PHYSICAL EXAM:  Constitutional: lethargic, NAD  HEENT: NCAT, DMM  Neck: Supple, No JVD  Respiratory: CTA-b/l  Cardiovascular: RRR s1s2, no m/r/g  Gastrointestinal: BS+, soft, NT/ND  Extremities: No peripheral edema b/l  Neurological: reduced generalized strength  Back: no CVAT b/l  Skin: (+)cyanotic changes of toes b/l feet  Access: [facility] tunneled cath        LABS:                        7.9    13.86 )-----------( 183      ( 05 Mar 2025 10:21 )             26.3     Na(131)/K(4.1)/Cl(94)/HCO3(26)/BUN(23)/Cr(3.28)Glu(115)/Ca(7.0)/Mg(--)/PO4(--)    03-05 @ 10:21  Na(126)/K(4.9)/Cl(88)/HCO3(20)/BUN(32)/Cr(4.49)Glu(141)/Ca(7.5)/Mg(--)/PO4(--)    03-04 @ 06:55      IMPRESSION: 74M w/ polio, neurogenic bladder/suprapubic catheter, 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 Last dialyzed Tuesday 3/4; as it appears he will require NYDIA, we can maintain him on a TTS schedule for now. We can plan for next HD today rather than tomorrow.    (2)Hyponatremia - improving as of yesterday, with high-Na+ bath    (3)Hypophosphatemia - now off PO4 binders    (4)Anemia - s/p IV iron; on Retacrit with HD    (5)PAD - POD#3 s/p LE bypass - cyanotic changes of toes b/l      RECOMMEND:   (1)HD today - no net UF - high-Na+ bath; Retacrit with HD  (2)Further management of PAD per Vascular                    Rafita Denny MD  [facility]  Office/on call physician: [phone]  Cell (7a-7p): [phone]       Seen on HD  No pain, no sob      VITAL:  T(C): , Max: 36.7 (03-05-25 @ 15:48)  T(F): , Max: 98 (03-05-25 @ 15:48)  HR: 84 (03-05-25 @ 23:50)  BP: 112/64 (03-05-25 @ 23:50)  BP(mean): --  RR: 18 (03-05-25 @ 23:50)  SpO2: 95% (03-05-25 @ 23:50)  Wt(kg): --      PHYSICAL EXAM:  Constitutional: lethargic, NAD  HEENT: NCAT, DMM  Neck: Supple, No JVD  Respiratory: CTA-b/l  Cardiovascular: RRR s1s2, no m/r/g  Gastrointestinal: BS+, soft, NT/ND  Extremities: No peripheral edema b/l  Neurological: reduced generalized strength  Back: no CVAT b/l  Skin: (+)cyanotic changes of toes b/l feet  Access: [facility] tunneled cath-accessed        LABS:                        7.9    13.86 )-----------( 183      ( 05 Mar 2025 10:21 )             26.3     Na(131)/K(4.1)/Cl(94)/HCO3(26)/BUN(23)/Cr(3.28)Glu(115)/Ca(7.0)/Mg(--)/PO4(--)    03-05 @ 10:21  Na(126)/K(4.9)/Cl(88)/HCO3(20)/BUN(32)/Cr(4.49)Glu(141)/Ca(7.5)/Mg(--)/PO4(--)    03-04 @ 06:55      IMPRESSION: 74M w/ polio, neurogenic bladder/suprapubic catheter, 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 On HD now, tolerating    (2)Hyponatremia - improving as of yesterday, with high-Na+ bath    (3)Hypophosphatemia - now off PO4 binders    (4)Anemia - s/p IV iron; on Retacrit with HD    (5)PAD - POD#3 s/p LE bypass - cyanotic changes of toes b/l      RECOMMEND:   (1)HD today - no net UF - high-Na+ bath; Retacrit with HD  (2)Further management of PAD per Vascular    counseled patient and daughter Cori (by phone) regarding patient's status; provided emotional support and answered questions as able            Rafita Denny MD  [facility]  Office/on call physician: [phone]  Cell (7a-7p): [phone]

## 2025-03-06 NOTE — PROGRESS NOTE ADULT - SUBJECTIVE AND OBJECTIVE BOX
Date of Service: 03-06-25 @ 09:41    Patient is a 74y old  Male who presents with a chief complaint of ESRD requiring HD, uremia (06 Mar 2025 08:03)      Any change in ROS:   No new respiratory events overnight. Denies SOB/CP.  O2 sats 97% on RA     MEDICATIONS  (STANDING):  albuterol/ipratropium for Nebulization 3 milliLiter(s) Nebulizer every 6 hours  aspirin  chewable 81 milliGRAM(s) Oral daily  atorvastatin 40 milliGRAM(s) Oral at bedtime  chlorhexidine 2% Cloths 1 Application(s) Topical daily  clopidogrel Tablet 75 milliGRAM(s) Oral daily  epoetin aleah-epbx (RETACRIT) Injectable 12332 Unit(s) IV Push <User Schedule>  guaiFENesin ER 1200 milliGRAM(s) Oral every 12 hours  hydrOXYzine hydrochloride 25 milliGRAM(s) Oral three times a day  ibuprofen  Tablet. 400 milliGRAM(s) Oral every 12 hours  oxyCODONE  ER Tablet 30 milliGRAM(s) Oral every 12 hours  pantoprazole    Tablet 40 milliGRAM(s) Oral before breakfast  polyethylene glycol 3350 17 Gram(s) Oral daily  senna 2 Tablet(s) Oral at bedtime    MEDICATIONS  (PRN):  acetaminophen     Tablet .. 650 milliGRAM(s) Oral every 6 hours PRN Temp greater or equal to 38C (100.4F), Mild Pain (1 - 3)  bisacodyl 5 milliGRAM(s) Oral every 12 hours PRN Constipation  HYDROmorphone  Injectable 2 milliGRAM(s) IV Push every 6 hours PRN Severe Pain (7 - 10)  oxyCODONE    IR 10 milliGRAM(s) Oral every 6 hours PRN Moderate Pain (4 - 6)  sodium chloride 0.9% lock flush 10 milliLiter(s) IV Push every 1 hour PRN Pre/post blood products, medications, blood draw, and to maintain line patency    Vital Signs Last 24 Hrs  T(C): 36.3 (06 Mar 2025 09:15), Max: 36.7 (05 Mar 2025 15:48)  T(F): 97.4 (06 Mar 2025 09:15), Max: 98 (05 Mar 2025 15:48)  HR: 75 (06 Mar 2025 09:15) (75 - 85)  BP: 147/79 (06 Mar 2025 09:15) (112/64 - 147/79)  BP(mean): --  RR: 18 (06 Mar 2025 09:15) (18 - 18)  SpO2: 97% (06 Mar 2025 09:15) (94% - 97%)    Parameters below as of 06 Mar 2025 09:15  Patient On (Oxygen Delivery Method): room air        I&O's Summary        Physical Exam:   GENERAL: NAD, well-groomed, well-developed  HEENT: VINNY/   Atraumatic, Normocephalic  ENMT: No tonsillar erythema, exudates, or enlargement; Moist mucous membranes, Good dentition, No lesions  NECK: Supple, No JVD, Normal thyroid  CHEST/LUNG: Clear to auscultation  CVS: Regular rate and rhythm; No murmurs, rubs, or gallops  GI: : Soft, Nontender, Nondistended; Bowel sounds present  NERVOUS SYSTEM:  Alert & Oriented X3  EXTREMITIES:  b/l mottled toes   LYMPH: No lymphadenopathy noted  SKIN: No rashes or lesions  ENDOCRINOLOGY: No Thyromegaly  PSYCH: Appropriate    Labs:                              7.9    13.86 )-----------( 183      ( 05 Mar 2025 10:21 )             26.3                         9.5    14.41 )-----------( 177      ( 04 Mar 2025 06:56 )             31.4                         8.5    11.77 )-----------( 193      ( 03 Mar 2025 07:13 )             28.5                         7.9    12.22 )-----------( 189      ( 03 Mar 2025 01:12 )             26.3     03-05    131[L]  |  94[L]  |  23  ----------------------------<  115[H]  4.1   |  26  |  3.28[H]  03-04    126[L]  |  88[L]  |  32[H]  ----------------------------<  141[H]  4.9   |  20[L]  |  4.49[H]  03-03    129[L]  |  92[L]  |  25[H]  ----------------------------<  161[H]  3.9   |  25  |  4.11[H]    Ca    7.0[L]      05 Mar 2025 10:21    TPro  5.9[L]  /  Alb  2.4[L]  /  TBili  0.1[L]  /  DBili  x   /  AST  21  /  ALT  7[L]  /  AlkPhos  159[H]  03-04    Urinalysis Basic - ( 05 Mar 2025 10:21 )    Color: x / Appearance: x / SG: x / pH: x  Gluc: 115 mg/dL / Ketone: x  / Bili: x / Urobili: x   Blood: x / Protein: x / Nitrite: x   Leuk Esterase: x / RBC: x / WBC x   Sq Epi: x / Non Sq Epi: x / Bacteria: x      Studies  < from: Xray Chest 1 View- PORTABLE-Urgent (Xray Chest 1 View- PORTABLE-Urgent .) (03.01.25 @ 22:48) >    ACC: [id number] EXAM:  XR CHEST PORTABLE URGENT 1V   ORDERED BY: DELPHINE BARNETT     PROCEDURE DATE:  03/01/2025          INTERPRETATION:  EXAMINATION: XR CHEST URGENT    CLINICAL INDICATION: cough    TECHNIQUE: Single frontal, portable view of the chest was obtained.    COMPARISON: Chest x-ray 2/20/2025.    FINDINGS:  Right-sided tunneled hemodialysis catheter terminates in the SVC.  The heart is not accurately assessed in this AP projection.  The lungs are clear.  There is no pneumothorax or pleural effusion.  No acute bony abnormality.    IMPRESSION:  Clear lungs.    --- End of Report ---      < end of copied text >         Date of Service: 03-06-25 @ 09:41    Patient is a 74y old  Male who presents with a chief complaint of ESRD requiring HD, uremia (06 Mar 2025 08:03)      Any change in ROS:   No new respiratory events overnight. Denies SOB/CP.  O2 sats 97% on RA     MEDICATIONS  (STANDING):  albuterol/ipratropium for Nebulization 3 milliLiter(s) Nebulizer every 6 hours  aspirin  chewable 81 milliGRAM(s) Oral daily  atorvastatin 40 milliGRAM(s) Oral at bedtime  chlorhexidine 2% Cloths 1 Application(s) Topical daily  clopidogrel Tablet 75 milliGRAM(s) Oral daily  epoetin aleah-epbx (RETACRIT) Injectable 09832 Unit(s) IV Push <User Schedule>  guaiFENesin ER 1200 milliGRAM(s) Oral every 12 hours  hydrOXYzine hydrochloride 25 milliGRAM(s) Oral three times a day  ibuprofen  Tablet. 400 milliGRAM(s) Oral every 12 hours  oxyCODONE  ER Tablet 30 milliGRAM(s) Oral every 12 hours  pantoprazole    Tablet 40 milliGRAM(s) Oral before breakfast  polyethylene glycol 3350 17 Gram(s) Oral daily  senna 2 Tablet(s) Oral at bedtime    MEDICATIONS  (PRN):  acetaminophen     Tablet .. 650 milliGRAM(s) Oral every 6 hours PRN Temp greater or equal to 38C (100.4F), Mild Pain (1 - 3)  bisacodyl 5 milliGRAM(s) Oral every 12 hours PRN Constipation  HYDROmorphone  Injectable 2 milliGRAM(s) IV Push every 6 hours PRN Severe Pain (7 - 10)  oxyCODONE    IR 10 milliGRAM(s) Oral every 6 hours PRN Moderate Pain (4 - 6)  sodium chloride 0.9% lock flush 10 milliLiter(s) IV Push every 1 hour PRN Pre/post blood products, medications, blood draw, and to maintain line patency    Vital Signs Last 24 Hrs  T(C): 36.3 (06 Mar 2025 09:15), Max: 36.7 (05 Mar 2025 15:48)  T(F): 97.4 (06 Mar 2025 09:15), Max: 98 (05 Mar 2025 15:48)  HR: 75 (06 Mar 2025 09:15) (75 - 85)  BP: 147/79 (06 Mar 2025 09:15) (112/64 - 147/79)  BP(mean): --  RR: 18 (06 Mar 2025 09:15) (18 - 18)  SpO2: 97% (06 Mar 2025 09:15) (94% - 97%)    Parameters below as of 06 Mar 2025 09:15  Patient On (Oxygen Delivery Method): room air        I&O's Summary        Physical Exam:   GENERAL: NAD, well-groomed, well-developed  HEENT: VINNY/   Atraumatic, Normocephalic  ENMT: No tonsillar erythema, exudates, or enlargement; Moist mucous membranes, Good dentition, No lesions  NECK: Supple, No JVD, Normal thyroid  CHEST/LUNG: Clear to auscultation  CVS: Regular rate and rhythm; No murmurs, rubs, or gallops  GI: : Soft, Nontender, Nondistended; Bowel sounds present  NERVOUS SYSTEM:  Alert & Oriented X3  EXTREMITIES:  b/l mottled toes   LYMPH: No lymphadenopathy noted  SKIN: No rashes or lesions  ENDOCRINOLOGY: No Thyromegaly  PSYCH: Appropriate    Labs:                              7.9    13.86 )-----------( 183      ( 05 Mar 2025 10:21 )             26.3                         9.5    14.41 )-----------( 177      ( 04 Mar 2025 06:56 )             31.4                         8.5    11.77 )-----------( 193      ( 03 Mar 2025 07:13 )             28.5                         7.9    12.22 )-----------( 189      ( 03 Mar 2025 01:12 )             26.3     03-05    131[L]  |  94[L]  |  23  ----------------------------<  115[H]  4.1   |  26  |  3.28[H]  03-04    126[L]  |  88[L]  |  32[H]  ----------------------------<  141[H]  4.9   |  20[L]  |  4.49[H]  03-03    129[L]  |  92[L]  |  25[H]  ----------------------------<  161[H]  3.9   |  25  |  4.11[H]    Ca    7.0[L]      05 Mar 2025 10:21    TPro  5.9[L]  /  Alb  2.4[L]  /  TBili  0.1[L]  /  DBili  x   /  AST  21  /  ALT  7[L]  /  AlkPhos  159[H]  03-04    Urinalysis Basic - ( 05 Mar 2025 10:21 )    Color: x / Appearance: x / SG: x / pH: x  Gluc: 115 mg/dL / Ketone: x  / Bili: x / Urobili: x   Blood: x / Protein: x / Nitrite: x   Leuk Esterase: x / RBC: x / WBC x   Sq Epi: x / Non Sq Epi: x / Bacteria: x      Studies  < from: Xray Chest 1 View- PORTABLE-Urgent (Xray Chest 1 View- PORTABLE-Urgent .) (03.01.25 @ 22:48) >    ACC: [id number] EXAM:  XR CHEST PORTABLE URGENT 1V   ORDERED BY: DELPHINE BARNETT     PROCEDURE DATE:  03/01/2025          INTERPRETATION:  EXAMINATION: XR CHEST URGENT    CLINICAL INDICATION: cough    TECHNIQUE: Single frontal, portable view of the chest was obtained.    COMPARISON: Chest x-ray 2/20/2025.    FINDINGS:  Right-sided tunneled hemodialysis catheter terminates in the SVC.  The heart is not accurately assessed in this AP projection.  The lungs are clear.  There is no pneumothorax or pleural effusion.  No acute bony abnormality.    IMPRESSION:  Clear lungs.    --- End of Report ---      < end of copied text >      ct< from: CT Chest No Cont (02.23.25 @ 16:46) >    IMPRESSION:  Mucoid impaction and airway associated subpleural groundglass opacities,   most prominent in left lower lobe and to a lesser degree in right lower   lobe.  Mild bronchial wall thickening, likely inflammatory.    < end of copied text >

## 2025-03-06 NOTE — PROGRESS NOTE ADULT - ASSESSMENT
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 He is well known to me from multiple recent admisions  s/p admissions at [facility] 12/20-12/25/24 and 2/4-2/7 with SONDRA on CKD with associated uremic symptoms.   At the last admission he had expressed strong interest to try to hold off with HD intiation but he has been getting worse clinically at home.  His SONDRA and uremic symptoms significantly improved after the first admission; they improved a to mild extent during the 2nd admission to the point where he was able to be discharged without HD. Since then, however, he has worsened further.   He has been suffering from progressively worsening diffuse pruritus, loss of appetite, and generalized weakness and falls.   His nephrologist and PCP has been speaking with him and his family over the past few days,   The initial plan was that he would present to the [facility] ER Monday 2/17 (ie tomorrow) for HD initiation. Today, however, he fell and sustained trauma to his knee. Given the fall and concern for knee fracture, he presented to the ER today. multiple xrays show no Fractures.      CKD5, uremia, requiring initiation of HD  Rash, seborrheic dermatitis  Pruritis  Anemia  PAD  SP catheter, chronic  Left inferior pole acute on chronic patella Fx    plan  - HD via R subclavian permacath  -3/6:  ptn is awake, alert, ecchymotic feet look improved, pain is controlled. DC planning to [facility]  3/4-5 - s/p b/l iliac arteries angioplasty and stent placements on 3/3. today has new ecchymoses in b/l LE, concern for arterial insufficiency. vascular aware.. wound from Left knee immobilizer is dressed and healing. pain is controlled.   LEFT UE AVF placement/scheduling will be on outptn basis as per vascular. dc planning to [facility]  - 3/3: ptn is s/p angiogram RLE : b/l iliac arteries w successful angioplasty and stents. in PACU. awaitng HD.  ptn has a pressure wound from the LLE immobilizer posteriorly proximal to the knee. its been on 2/2 knee fracture, applied by ortho and managed by ptn's wife as per ptn's and wife's request , this was d/w nursing and nurse manager ms Ruiz.. immobilizer should be managed by orthopedics and nursing. will keep it off, only keep on when repositioning for care and then remove. wound care to F/U . this was discussed at length w daughter Cori and wife Dee Dee.   -3/1-3/2: ptn is smiling, pain free, had HD 2/28 and 3/1, awaiting RLE angiogram 3/3, on Heparin drip, euvolemic  -2/28: ptn is lethargic, awaiting RLE angiogram possible fem-fem bypass hopefully on 3/3 pending OR availability, awaiting HD today    -2/27:  plan for angiogram in am with possible angioplasty, possible stent, possible fem-fem bypass. medically cleared for angiogram and possible surgery, stent, angioplasty. pain is controlled. remains on heparin drip as per vascular. HD as per renal    -2/26:  ptn is sleeping, pain is controlled, he was seen by wound care and vascular attending. planning on angiogram 2/2 severe PAD in LE on CTA. he also spoke to HCP. ptn and HCP in agreement. ptn was started on HEPARIN drip as per vascular recs. RIJ permacath done 2/25    - 2/25: ptn states he is hallucinating, but not at present, his MS is at baseline, his pain is controlled, he still needs prn pain meds when he is moved in the bed or for testing or for HD. awaiting Permacath, AVF creation, LE bypass to be d/w Dr. Swenson and the ptn tomorrow. ptn has cardiology clearance  if he opts for. Ptn has sacral decubiti and posterior thigh and buttock decibiti and b/l heel decubiti. Z flow bobel d/w RN, get wound care consult    -2/24: pain is controlled  if the LLE is immobile and fully extended. doesn't tolerate the knee immobilizer. has a medium condyle and acute on chronic patella fx in LEFT knee. as per vascular ptn will need iliac stent  w a fem-fem bypass, possible axillo-femoral bypass. for this surgery he would need cardiac work up . more pressing surgery is LUE AVF creation,  in IR will arrange for Permacath. for pain control: Motrin 400 mg q12H, Oxy ER 30 mg q12H, Oxy IR 10 for mod pain and dilaudid 2 mg iv for severe pain. still has pruritis though improved, will raise atarax 25 mg to tid. plan of care d/w daughter Norma Persaud , ptn and his wife. Also d/w renal, card, vascular, ACP    -2/23: Daughter Cori is the HCP, she and the ptn filled out the paperwork. daily plan and findings d/w her and the ptn. MS is at abseline, c/o b/l LE foot pain and Left Knee pain. xrays of left knee: cannot r/o acute on chronic inferior pole patellar Fx. knee immobilizer is on, awaiting ortho consult. doubt needs any intervention awaiting Ct chest today, will add CT Left knee. ptn states itching has recurred, severe pain has recurred. will resume Atatrax ( 25 mg bid) and Oxycodone ER , but at a lower dose 15 mg q12H. cont prn analgesics. HD as per renal, awaiting Vascular consult f/u re CTA A/L &LE and recs. ptn also wants AVF surgery on this admission. Coughing has stopped    - 2/22: ptn is drowsy but arousable, calmer today, answers questions appropriately. he is pain free, he stopped coughing, he denies having pruritis: will DC:  OXY ER, Atarax, Tessalon perles.     -  2/21: ptn is tearful, a bit confused, coughing, recognizes me and family at bedside. GOC d/w daughter and wife. AMS prob delirium 2/2 acute illness +/- opiods, lyrica, atarac. will lower atarak to tid, dc lyrica, cont Oxy 2/2 ptn has severe LE pain. neuro called for eval. Head ct done yesterday w no acute findings. CTA A/P w LE run fof: severe PAD, vascular to follow up on plan fo care. plan for HD tomorrow and next week place on tiw schedule of MWF. will need tunneled catheter prior to DC and will d/w vascular scheduling of AVF. ptn wants all to be done while inptn. daughter wants to be HCP as per ptn's wishes. she was given paperwork to get it signed and witnessed and will present to nursing thereafter. details of findings and complaints and plan of care d/w daughter and wife. spent 60 min. RVP ordered. start mucinex , tessalon perles, duonebs, get CT chest to r/o PNA. pulm called    -  2/20:  ptn had RRT 2/2 AMS and tremors. no SZ, no LOC. noted to have Na 123( hyponatremia), given 500 cc NS. Gabapentin DCed. ptn had been taking gabapentin at home PTA. Pain is controlled. Pruritis resolved, tolerating HD otherwise.     - Pain management:  cont Oxycodone 10 IR q6H,  DIlaudid prn severe pain 2 mg q4H prn.   - cont outptn meds  - DVT ppx w HSC

## 2025-03-06 NOTE — PROGRESS NOTE ADULT - ASSESSMENT
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 The initial plan was that he would present to the [facility] ER Monday 2/17 for HD initiation. However, day of admission, he fell and sustained trauma to his knee. Called to consult for cough, elevated R hemidiaphragm.

## 2025-03-06 NOTE — PROGRESS NOTE ADULT - ASSESSMENT
74M with history of HTN, polio with multiple subsequent problems including LE weakness requiring wheelchair, neurogenic bladder s/p suprapubic catheter, and colostomy s/p iatrogenic rectal injury 2/2023, and CKD 5 presenting to the ED with weakness and falls. Admitted for initiation of HD now s/p R IJ shiley placement on 2/17/25 and conversion to tunneled catheter on 2/25. Vascular surgery initially consulted for AVF creation; however, patient's worsening PAD with worsening ischemic changes is the more urgent issue now s/p b/l iliac stent placement on 3/3/25.     Plan:  - no further revascularization options  - recommend podiatry consult given c/f toes, foot is ok   - c/w ASA/Plavix  - continue HD through permacath, plan for AVF intervention outpatient      - continue to protect nondominant left arm  - vascular surgery to follow while patient in hospital    Vascular Surgery   [id number]     74M with history of HTN, polio with multiple subsequent problems including LE weakness requiring wheelchair, neurogenic bladder s/p suprapubic catheter, and colostomy s/p iatrogenic rectal injury 2/2023, and CKD 5 presenting to the ED with weakness and falls. Admitted for initiation of HD now s/p R IJ shiley placement on 2/17/25 and conversion to tunneled catheter on 2/25. Vascular surgery initially consulted for AVF creation; however, patient's worsening PAD with worsening ischemic changes is the more urgent issue now s/p b/l iliac stent placement on 3/3/25.     Plan:  - no further revascularization options  - recommend podiatry consult given c/f toes, foot is ok   - c/w ASA/Plavix  - continue HD through permacath, plan for AVF intervention outpatient      - continue to protect nondominant left arm      - can f/u with Dr. Eloy Swenson : [phone] ; [address], [address], [place] [address]   - vascular surg to sign off, reconsult PRN    Vascular Surgery   [id number]

## 2025-03-06 NOTE — PROGRESS NOTE ADULT - ASSESSMENT
74 year old male with CKD, sp polio, paraplegia with associated neurogenic bladder s/p suprapubic catheter who was admitted s/p fall on 2/16.   CKD - ESRD, now s/p DEYA boston 2/17, on HD  ruled out cellulitis around suprapubic cath  2/20 s/p RRT for tremors and confusion -- pt with chronic tremors although notably worse  hyponatremia    mrsa negative   2/20 CXR with clear lungs   SPC site with chronic/stable inflammatory changes, no drainage - no sign of SSTI  CTA abd with occlusion of b/l external iliac arteries and b/l superficial femoral arteries with distal reconstitution at popliteal arteries; patent three vessel runoff of RLE with 2 vessel runoff of LLE with occlusion of L mid anterior tibial artery with distal reconstitution  Bcx negative to date   mental status at baseline  WBC noted post op-downtrending, remains afebrile   Vascular following for worsening PAD with worsening ischemic changes   -3/3 s/p RLE angiogram b/l iliac artery stents     s/p vancomycin x1 2/20  s/p zosyn 2/20-2/22      Recommendations:   Continue off antibiotics   Vascular surgery following  HD per renal    Monitor temps/WBC  Continue rest of care per primary team       Bridgette Ramirez M.D.  [facility]  Available on [employer] TEAMS - *PREFERRED*  [phone]  After 5pm on weekdays and all day on weekends - please call [phone]     Thank you for consulting us and involving us in the management of this patients case. In addition to reviewing history, imaging, documents, labs, microbiology, took into account antibiotic stewardship, local antibiogram and infection control strategies and potential transmission issues at time of treatment decision making process.

## 2025-03-06 NOTE — CONSULT NOTE ADULT - SUBJECTIVE AND OBJECTIVE BOX
Patient is a 74y old  Male who presents with a chief complaint of ESRD requiring HD, uremia (06 Mar 2025 11:22)      HPI:  74 year-old man with history of multiple medical issues including polio with associated neurogenic bladder/suprapubic catheter, iatrogenic rectal rupture requiring colostomy 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 He is well known to me from multiple recent admisions  s/p admissions at [facility] 12/20-12/25/24 and 2/4-2/7 with SONDRA on CKD with associated uremic symptoms.   At the last admission he had expressed strong interest to try to hold off with HD intiation but he has been getting worse clinically at home.  His SONDRA and uremic symptoms significantly improved after the first admission; they improved a to mild extent during the 2nd admission to the point where he was able to be discharged without HD. Since then, however, he has worsened further.   He has been suffering from progressively worsening diffuse pruritus, loss of appetite, and generalized weakness and falls.   His nephrologist and PCP has been speaking with him and his family over the past few days,   The initial plan was that he would present to the [facility] ER Monday 2/17 (ie tomorrow) for HD initiation. Today, however, he fell and sustained trauma to his knee. Given the fall and concern for knee fracture, he presented to the ER today. multiple xrays show no Fractures. (16 Feb 2025 19:41)      PAST MEDICAL & SURGICAL HISTORY:  Polio      Hypertension      H/O urinary retention  suprapubic tube      HLD (hyperlipidemia)      BPH with obstruction/lower urinary tract symptoms      Erectile dysfunction      Bladder outlet obstruction      Presence of suprapubic catheter      Colostomy status      DVT, lower extremity      H/O cardiac murmur      S/P colostomy  2/2024      Suprapubic catheter      S/P ORIF (open reduction internal fixation) fracture  b/l legs      S/P cataract surgery  right      H/O shoulder surgery          MEDICATIONS  (STANDING):  albuterol/ipratropium for Nebulization 3 milliLiter(s) Nebulizer every 6 hours  aspirin  chewable 81 milliGRAM(s) Oral daily  atorvastatin 40 milliGRAM(s) Oral at bedtime  chlorhexidine 2% Cloths 1 Application(s) Topical daily  clopidogrel Tablet 75 milliGRAM(s) Oral daily  epoetin aleah-epbx (RETACRIT) Injectable 32911 Unit(s) IV Push <User Schedule>  guaiFENesin ER 1200 milliGRAM(s) Oral every 12 hours  hydrOXYzine hydrochloride 25 milliGRAM(s) Oral three times a day  ibuprofen  Tablet. 400 milliGRAM(s) Oral every 12 hours  oxyCODONE  ER Tablet 30 milliGRAM(s) Oral every 12 hours  pantoprazole    Tablet 40 milliGRAM(s) Oral before breakfast  polyethylene glycol 3350 17 Gram(s) Oral daily  senna 2 Tablet(s) Oral at bedtime    MEDICATIONS  (PRN):  acetaminophen     Tablet .. 650 milliGRAM(s) Oral every 6 hours PRN Temp greater or equal to 38C (100.4F), Mild Pain (1 - 3)  bisacodyl 5 milliGRAM(s) Oral every 12 hours PRN Constipation  HYDROmorphone  Injectable 2 milliGRAM(s) IV Push every 6 hours PRN Severe Pain (7 - 10)  oxyCODONE    IR 10 milliGRAM(s) Oral every 6 hours PRN Moderate Pain (4 - 6)  sodium chloride 0.9% lock flush 10 milliLiter(s) IV Push every 1 hour PRN Pre/post blood products, medications, blood draw, and to maintain line patency      Allergies    No Known Allergies    Intolerances        VITALS:    Vital Signs Last 24 Hrs  T(C): 36.3 (06 Mar 2025 11:10), Max: 36.7 (05 Mar 2025 15:48)  T(F): 97.3 (06 Mar 2025 11:10), Max: 98 (05 Mar 2025 15:48)  HR: 79 (06 Mar 2025 11:10) (75 - 85)  BP: 109/54 (06 Mar 2025 11:10) (109/54 - 147/79)  BP(mean): --  RR: 19 (06 Mar 2025 11:10) (18 - 19)  SpO2: 98% (06 Mar 2025 11:10) (94% - 98%)    Parameters below as of 06 Mar 2025 11:10  Patient On (Oxygen Delivery Method): room air        LABS:                          7.6    12.14 )-----------( 183      ( 06 Mar 2025 09:51 )             24.7       03-05    131[L]  |  94[L]  |  23  ----------------------------<  115[H]  4.1   |  26  |  3.28[H]    Ca    7.0[L]      05 Mar 2025 10:21        CAPILLARY BLOOD GLUCOSE              LOWER EXTREMITY PHYSICAL EXAM:    Vasular: DP/PT 0/4, B/L, CFT <4 seconds B/L, Temperature gradient  warm to cool, B/L.   Neuro: Epicritic sensation intact to the level of digits B/L.  Skin: b/l digits 1-5, increased ischemic/ dusky changes, decreased warmth, No open ulcerations, no purulence, no fluctuance, no malodor, or clinical signs of infection.      RADIOLOGY & ADDITIONAL STUDIES:

## 2025-03-06 NOTE — CONSULT NOTE ADULT - ASSESSMENT
74M presents with b/l ischemic changes to digits  - Patient seen and evaluated  - Afebrile, WBC 12,13  - b/l digits 1-5, increased ischemic/ dusky changes, decreased warmth, No open ulcerations, no purulence, no fluctuance, no malodor, or clinical signs of infection.  - Vascular recs, appreciated  - No acute podiatric surgical intervention at this time  - Pt scheduled for outpt procedure with Dr. Swenson  - Will follow closely with vascular.   - Pt to follow up in wound care clinic with Dr. Mullins within 1 week of discharge to closely monitor ischemic changes to b/l digits  - Seen with attending

## 2025-03-06 NOTE — PROGRESS NOTE ADULT - ASSESSMENT
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  came in after fall and for HD.     2/20 CTH neg   \Na 123 --> now 133   A1c 5.7   TSH WNL 3.46   o/e 2/21 AAOx2, uppers 4/5, lowers limited .  2/23; family bedside upset that he has pain in leg after he was moved.  patient going for imaging now.   s/p R IJ permacath by IR 2/25 2/27 AAOx3     Imrpssion  1) AMS, waxing and waing , likely multifactorial from infection, metabolic/electrolyte derrangements/ delerium / medication effect , ureemia which is imporving   2) polio  3) fall 2/2 weakness  4) hyponatremia to 123 2/20  improved 133 -->136    - f/u podiatry./ f/u vascular   - monitor puleses in distal LE; f/u vascular    - eventually AVF  - f/u vascular; no vascluar options   - on DAPT   - was getting oxycodone ER 30mg BID and oxy PRN IR i10 and dilauded PRN ;   - f/u psych   - limit sedating meds   - continue to monitor and correct metabolic derrangements .  - delerium precuations.   - frequent re orientation  - check b12, RPR, ammonia level   - will consdier MRI brain or EEG if doesn't improve but seems to have been improving   - PT/OT   - check FS, glucose control <180  - GI/DVT ppx  - Thank you for allowing me to participate in the care of this patient. Call with questions.    spoke with wife 3/6   Paul Limon MD  Vascular Neurology  Office: [phone]

## 2025-03-06 NOTE — PROGRESS NOTE ADULT - SUBJECTIVE AND OBJECTIVE BOX
SUBJECTIVE: NAEO. Evaluated by wound care yesterday for wounds on sacrum and b/l lower extremities    Vital Signs Last 24 Hrs  T(C): 36.3 (06 Mar 2025 09:15), Max: 36.7 (05 Mar 2025 15:48)  T(F): 97.4 (06 Mar 2025 09:15), Max: 98 (05 Mar 2025 15:48)  HR: 75 (06 Mar 2025 09:15) (75 - 85)  BP: 147/79 (06 Mar 2025 09:15) (112/64 - 147/79)  BP(mean): --  RR: 18 (06 Mar 2025 09:15) (18 - 18)  SpO2: 97% (06 Mar 2025 09:15) (94% - 97%)    Parameters below as of 06 Mar 2025 09:15  Patient On (Oxygen Delivery Method): room air        I&O's Detail      Physical Exam:  General: NAD  Neuro: Awake, alert and oriented x3  Resp: Nonlabored breathing  CV: Hemodynamically stable.   GI/Abd: Soft, obese, nontender. Dressing to LLQ C/D/I. Suprapubic catheter in place  Vascular: Nonpalpable femoral pulses. Nonpalpable/lack of signals DP/PT b/l.   Extremities: BLE atrophic, minimal dorsi/plantarflexion bilaterally. Sensation diminished, LLE edematous compared to R, mottling of right toes/forefoot/heel, No active wounds bilat.    LABS:                        7.9    13.86 )-----------( 183      ( 05 Mar 2025 10:21 )             26.3     03-05    131[L]  |  94[L]  |  23  ----------------------------<  115[H]  4.1   |  26  |  3.28[H]    Ca    7.0[L]      05 Mar 2025 10:21        Urinalysis Basic - ( 05 Mar 2025 10:21 )    Color: x / Appearance: x / SG: x / pH: x  Gluc: 115 mg/dL / Ketone: x  / Bili: x / Urobili: x   Blood: x / Protein: x / Nitrite: x   Leuk Esterase: x / RBC: x / WBC x   Sq Epi: x / Non Sq Epi: x / Bacteria: x        RADIOLOGY & ADDITIONAL STUDIES:

## 2025-03-06 NOTE — PROGRESS NOTE ADULT - SUBJECTIVE AND OBJECTIVE BOX
Neurology      S; patient seen. no neuro changes           Medications: MEDICATIONS  (STANDING):  albuterol/ipratropium for Nebulization 3 milliLiter(s) Nebulizer every 6 hours  aspirin  chewable 81 milliGRAM(s) Oral daily  atorvastatin 40 milliGRAM(s) Oral at bedtime  chlorhexidine 2% Cloths 1 Application(s) Topical daily  clopidogrel Tablet 75 milliGRAM(s) Oral daily  epoetin aleah-epbx (RETACRIT) Injectable 43561 Unit(s) IV Push <User Schedule>  guaiFENesin ER 1200 milliGRAM(s) Oral every 12 hours  hydrOXYzine hydrochloride 25 milliGRAM(s) Oral three times a day  ibuprofen  Tablet. 400 milliGRAM(s) Oral every 12 hours  oxyCODONE  ER Tablet 30 milliGRAM(s) Oral every 12 hours  pantoprazole    Tablet 40 milliGRAM(s) Oral before breakfast  polyethylene glycol 3350 17 Gram(s) Oral daily  senna 2 Tablet(s) Oral at bedtime    MEDICATIONS  (PRN):  acetaminophen     Tablet .. 650 milliGRAM(s) Oral every 6 hours PRN Temp greater or equal to 38C (100.4F), Mild Pain (1 - 3)  bisacodyl 5 milliGRAM(s) Oral every 12 hours PRN Constipation  HYDROmorphone  Injectable 2 milliGRAM(s) IV Push every 6 hours PRN Severe Pain (7 - 10)  oxyCODONE    IR 10 milliGRAM(s) Oral every 6 hours PRN Moderate Pain (4 - 6)  sodium chloride 0.9% lock flush 10 milliLiter(s) IV Push every 1 hour PRN Pre/post blood products, medications, blood draw, and to maintain line patency       Vitals:  Vital Signs Last 24 Hrs  T(C): 36.3 (06 Mar 2025 11:10), Max: 36.7 (05 Mar 2025 15:48)  T(F): 97.3 (06 Mar 2025 11:10), Max: 98 (05 Mar 2025 15:48)  HR: 79 (06 Mar 2025 11:10) (75 - 85)  BP: 109/54 (06 Mar 2025 11:10) (109/54 - 147/79)  BP(mean): --  RR: 19 (06 Mar 2025 11:10) (18 - 19)  SpO2: 98% (06 Mar 2025 11:10) (94% - 98%)    Parameters below as of 06 Mar 2025 11:10  Patient On (Oxygen Delivery Method): room air                  General Appearance: Appropriately dressed and in no acute distress       Head: Normocephalic, atraumatic and no dysmorphic features  Ear, Nose, and Throat: Moist mucous membranes  CVS: S1S2+  Resp: No SOB, no wheeze or rhonchi  GI: soft NT/ND  Extremities: LE PVD : dusky toes noted   Skin: + decub      Neurological Exam:  Mental Status: Awake, alert and oriented x 2.  Able to follow simple and complex verbal commands. Able to name and repeat. fluent speech. No obvious aphasia or dysarthria noted.   Cranial Nerves: PERRL, EOMI, VFFC, sensation V1-V3 intact,  no obvious facial asymmetry, equal elevation of palate, scm/trap 5/5, tongue is midline on protrusion. no obvious papilledema on fundoscopic exam. hearing is grossly intact.   Motor: Normal bulk, tone and strength throughout uppers 4/ lowers 0-/1 5   Sensation: Intact to light touch and pinprick throughout.     Coordination: No dysmetria on FNF   Gait: deferred, wheelchair bound     Data/Labs/Imaging which I personally reviewed.         LABS:                          7.6    12.14 )-----------( 183      ( 06 Mar 2025 09:51 )             24.7     03-05    131[L]  |  94[L]  |  23  ----------------------------<  115[H]  4.1   |  26  |  3.28[H]    Ca    7.0[L]      05 Mar 2025 10:21          Urinalysis Basic - ( 05 Mar 2025 10:21 )    Color: x / Appearance: x / SG: x / pH: x  Gluc: 115 mg/dL / Ketone: x  / Bili: x / Urobili: x   Blood: x / Protein: x / Nitrite: x   Leuk Esterase: x / RBC: x / WBC x   Sq Epi: x / Non Sq Epi: x / Bacteria: x            < from: CT Head No Cont (02.20.25 @ 18:47) >    ACC: [id number] EXAM:  CT BRAIN   ORDERED BY: GUY JONO     PROCEDURE DATE:  02/20/2025          INTERPRETATION:  CLINICAL INDICATION: Altered mental status    TECHNIQUE: Axial CT scanning of the brain was obtained from the skull   base to the vertex without the administration of intravenous contrast.   Reformatted coronal and sagittal images were subsequently obtained and   reviewed.    COMPARISON: None    FINDINGS:  There is no CT evidence of acute transcortical infarct. Age-related   involutional changes and chronic microvascular ischemic changes.    There is no hydrocephalus, mass effect, or acute intracranial hemorrhage.   No extra-axial collection. Basal cisterns are patent.    The visualized paranasal sinuses and mastoid air cells are clear.    The calvarium is intact.    IMPRESSION:  No evidence of acute transcortical infarct, acute intracranial   hemorrhage, or mass effect.    --- End of Report ---            CORTNEY REARDON MD; Attending Radiologist  This document has been electronically signed. Feb 20 2025  7:07PM    < end of copied text >

## 2025-03-06 NOTE — PROGRESS NOTE ADULT - SUBJECTIVE AND OBJECTIVE BOX
[facility]  SABINO Griffin Y. Patel, S. Shah, G. Casimir  [phone]  ([phone] - weekdays after 5pm and weekends)    Name: BRIAN DEMPSEY  Age/Gender: 74y Male  MRN: [id number]    Interval History:  Patient seen and examined this morning.   Resting comfortably.   Notes reviewed  No concerning overnight events  Afebrile   Allergies: No Known Allergies      Objective:  Vitals:   T(F): 97.4 (03-06-25 @ 09:15), Max: 98 (03-05-25 @ 15:48)  HR: 75 (03-06-25 @ 09:15) (75 - 85)  BP: 147/79 (03-06-25 @ 09:15) (112/64 - 147/79)  RR: 18 (03-06-25 @ 09:15) (18 - 18)  SpO2: 97% (03-06-25 @ 09:15) (94% - 97%)  Physical Examination:  General: no acute distress, RA  HEENT: normocephalic, atraumatic  Respiratory: no acc muscle use, breathing comfortably  Cardiovascular: S1 and S2 present  Gastrointestinal: normal appearing, nondistended  Extremities: b/l feet/toes cyanotic changes    Laboratory Studies:  CBC:                       7.6    12.14 )-----------( 183      ( 06 Mar 2025 09:51 )             24.7     WBC Trend:  12.14 03-06-25 @ 09:51  13.86 03-05-25 @ 10:21  14.41 03-04-25 @ 06:56  11.77 03-03-25 @ 07:13  12.22 03-03-25 @ 01:12  16.50 03-02-25 @ 07:28  10.79 03-01-25 @ 07:02  9.29 02-28-25 @ 09:56    CMP: 03-05    131[L]  |  94[L]  |  23  ----------------------------<  115[H]  4.1   |  26  |  3.28[H]    Ca    7.0[L]      05 Mar 2025 10:21      Creatinine: 3.28 mg/dL (03-05-25 @ 10:21)  Creatinine: 4.49 mg/dL (03-04-25 @ 06:55)  Creatinine: 4.49 mg/dL (03-04-25 @ 06:55)  Creatinine: 4.11 mg/dL (03-03-25 @ 01:12)  Creatinine: 5.69 mg/dL (03-01-25 @ 07:01)    Microbiology: reviewed   Radiology: reviewed     Medications:  acetaminophen     Tablet .. 650 milliGRAM(s) Oral every 6 hours PRN  albuterol/ipratropium for Nebulization 3 milliLiter(s) Nebulizer every 6 hours  aspirin  chewable 81 milliGRAM(s) Oral daily  atorvastatin 40 milliGRAM(s) Oral at bedtime  bisacodyl 5 milliGRAM(s) Oral every 12 hours PRN  chlorhexidine 2% Cloths 1 Application(s) Topical daily  clopidogrel Tablet 75 milliGRAM(s) Oral daily  epoetin aleah-epbx (RETACRIT) Injectable 21477 Unit(s) IV Push <User Schedule>  guaiFENesin ER 1200 milliGRAM(s) Oral every 12 hours  HYDROmorphone  Injectable 2 milliGRAM(s) IV Push every 6 hours PRN  hydrOXYzine hydrochloride 25 milliGRAM(s) Oral three times a day  ibuprofen  Tablet. 400 milliGRAM(s) Oral every 12 hours  oxyCODONE    IR 10 milliGRAM(s) Oral every 6 hours PRN  oxyCODONE  ER Tablet 30 milliGRAM(s) Oral every 12 hours  pantoprazole    Tablet 40 milliGRAM(s) Oral before breakfast  polyethylene glycol 3350 17 Gram(s) Oral daily  senna 2 Tablet(s) Oral at bedtime  sodium chloride 0.9% lock flush 10 milliLiter(s) IV Push every 1 hour PRN    Prior/Completed Antimicrobials:  piperacillin/tazobactam IVPB.  piperacillin/tazobactam IVPB.-  vancomycin  IVPB

## 2025-03-06 NOTE — PROGRESS NOTE ADULT - SUBJECTIVE AND OBJECTIVE BOX
Patient is a 74y old  Male who presents with a chief complaint of ESRD requiring HD, uremia (06 Mar 2025 09:57)      SUBJECTIVE / OVERNIGHT EVENTS: ptn is awake, alert, ecchymotic feet look improved, pain is controlled. DC planning to [facility]    MEDICATIONS  (STANDING):  albuterol/ipratropium for Nebulization 3 milliLiter(s) Nebulizer every 6 hours  aspirin  chewable 81 milliGRAM(s) Oral daily  atorvastatin 40 milliGRAM(s) Oral at bedtime  chlorhexidine 2% Cloths 1 Application(s) Topical daily  clopidogrel Tablet 75 milliGRAM(s) Oral daily  epoetin aleah-epbx (RETACRIT) Injectable 67785 Unit(s) IV Push <User Schedule>  guaiFENesin ER 1200 milliGRAM(s) Oral every 12 hours  hydrOXYzine hydrochloride 25 milliGRAM(s) Oral three times a day  ibuprofen  Tablet. 400 milliGRAM(s) Oral every 12 hours  oxyCODONE  ER Tablet 30 milliGRAM(s) Oral every 12 hours  pantoprazole    Tablet 40 milliGRAM(s) Oral before breakfast  polyethylene glycol 3350 17 Gram(s) Oral daily  senna 2 Tablet(s) Oral at bedtime    MEDICATIONS  (PRN):  acetaminophen     Tablet .. 650 milliGRAM(s) Oral every 6 hours PRN Temp greater or equal to 38C (100.4F), Mild Pain (1 - 3)  bisacodyl 5 milliGRAM(s) Oral every 12 hours PRN Constipation  HYDROmorphone  Injectable 2 milliGRAM(s) IV Push every 6 hours PRN Severe Pain (7 - 10)  oxyCODONE    IR 10 milliGRAM(s) Oral every 6 hours PRN Moderate Pain (4 - 6)  sodium chloride 0.9% lock flush 10 milliLiter(s) IV Push every 1 hour PRN Pre/post blood products, medications, blood draw, and to maintain line patency      Vital Signs Last 24 Hrs  T(F): 97.4 (03-06-25 @ 09:15), Max: 98 (03-05-25 @ 15:48)  HR: 75 (03-06-25 @ 09:15) (75 - 85)  BP: 147/79 (03-06-25 @ 09:15) (112/64 - 147/79)  RR: 18 (03-06-25 @ 09:15) (18 - 18)  SpO2: 97% (03-06-25 @ 09:15) (94% - 97%)  Telemetry:   CAPILLARY BLOOD GLUCOSE        I&O's Summary      PHYSICAL EXAM:  GENERAL: NAD, well-developed  HEAD:  Atraumatic, Normocephalic  EYES: EOMI, PERRLA, conjunctiva and sclera clear  NECK: Supple, No JVD  CHEST/LUNG: Clear to auscultation bilaterally; No wheeze  HEART: Regular rate and rhythm; No murmurs, rubs, or gallops  ABDOMEN: Soft, Nontender, Nondistended; Bowel sounds present  EXTREMITIES:  2+ Peripheral Pulses, No clubbing, cyanosis, or edema  PSYCH: AAOx3  NEUROLOGY: non-focal  SKIN: No rashes or lesions    LABS:                        7.6    12.14 )-----------( 183      ( 06 Mar 2025 09:51 )             24.7     03-05    131[L]  |  94[L]  |  23  ----------------------------<  115[H]  4.1   |  26  |  3.28[H]    Ca    7.0[L]      05 Mar 2025 10:21            Urinalysis Basic - ( 05 Mar 2025 10:21 )    Color: x / Appearance: x / SG: x / pH: x  Gluc: 115 mg/dL / Ketone: x  / Bili: x / Urobili: x   Blood: x / Protein: x / Nitrite: x   Leuk Esterase: x / RBC: x / WBC x   Sq Epi: x / Non Sq Epi: x / Bacteria: x        RADIOLOGY & ADDITIONAL TESTS:    Imaging Personally Reviewed:    Consultant(s) Notes Reviewed:      Care Discussed with Consultants/Other Providers:

## 2025-03-06 NOTE — PROGRESS NOTE ADULT - PROBLEM SELECTOR PLAN 1
Admitted for initiation of HD  s/p shiley placement 2/17  Tolerating HD  s/p RIJ permacath placement with IR 2/25  TTE wnl  continue HD through permacath, plan for AVF intervention outpatient  Planning dc to [facility]

## 2025-03-06 NOTE — PROGRESS NOTE ADULT - SUBJECTIVE AND OBJECTIVE BOX
Subjective: Patient seen and examined. No new events except as noted.   ecchymotic feet look improved, pain is controlled.    REVIEW OF SYSTEMS:    CONSTITUTIONAL: +weakness, fevers or chills  EYES/ENT: No visual changes;  No vertigo or throat pain   NECK: No pain or stiffness  RESPIRATORY: No cough, wheezing, hemoptysis; No shortness of breath  CARDIOVASCULAR: No chest pain or palpitations  GASTROINTESTINAL: No abdominal or epigastric pain. No nausea, vomiting, or hematemesis; No diarrhea or constipation. No melena or hematochezia.  GENITOURINARY: No dysuria, frequency or hematuria  NEUROLOGICAL: No numbness or weakness  SKIN: No itching, burning, rashes, or lesions   All other review of systems is negative unless indicated above.    MEDICATIONS:  MEDICATIONS  (STANDING):  albuterol/ipratropium for Nebulization 3 milliLiter(s) Nebulizer every 6 hours  aspirin  chewable 81 milliGRAM(s) Oral daily  atorvastatin 40 milliGRAM(s) Oral at bedtime  chlorhexidine 2% Cloths 1 Application(s) Topical daily  clopidogrel Tablet 75 milliGRAM(s) Oral daily  epoetin aleah-epbx (RETACRIT) Injectable 63695 Unit(s) IV Push <User Schedule>  guaiFENesin ER 1200 milliGRAM(s) Oral every 12 hours  hydrOXYzine hydrochloride 25 milliGRAM(s) Oral three times a day  ibuprofen  Tablet. 400 milliGRAM(s) Oral every 12 hours  oxyCODONE  ER Tablet 30 milliGRAM(s) Oral every 12 hours  pantoprazole    Tablet 40 milliGRAM(s) Oral before breakfast  polyethylene glycol 3350 17 Gram(s) Oral daily  senna 2 Tablet(s) Oral at bedtime      PHYSICAL EXAM:  T(C): 36.3 (03-06-25 @ 09:15), Max: 36.7 (03-05-25 @ 15:48)  HR: 75 (03-06-25 @ 09:15) (75 - 85)  BP: 147/79 (03-06-25 @ 09:15) (112/64 - 147/79)  RR: 18 (03-06-25 @ 09:15) (18 - 18)  SpO2: 97% (03-06-25 @ 09:15) (94% - 97%)  Wt(kg): --  I&O's Summary        Appearance: NAD  HEENT:  Dry oral mucosa, PERRL, EOMI	  Lymphatic: No lymphadenopathy  Cardiovascular: Normal S1 S2, No JVD, No murmurs, No edema  Respiratory: Lungs clear to auscultation	  Psychiatry: A & O x 3, Mood & affect appropriate  Skin: No rashes, No ecchymoses, No cyanosis	  Neurologic: Non-focal  GI/Abd: Soft, obese, nontender. Dressing to LLQ C/D/I. Suprapubic catheter in place  Ext: Palp femoral pulses bilat. BLE atrophic, minimal dorsi/plantarflexion bilaterally. Sensation grossly intact. LLE edematous compared to R, erythema to right toes/forefoot. mottling of right toes/forefoot/heel  LLE:  small superficial abrasion, +edema and +ecchymosis over L knee  +TTP over L knee, no TTP along remainder of extremity; compartments soft  Limited ROM at knee 2/2 pain  No gross varus/valgus laxity, but assessment limited 2/2 pain  Motor: TA/EHL/GS/FHL intact  Sensory: DP/SP/Tib/Jordan/Saph SILT  +DP pulse (symmetric relative to contralateral side), WWP   pressure wound from the LLE immobilizer posteriorly proximal to the knee.  Vascular: Peripheral pulses palpable 2+ bilaterally  LABS:    CARDIAC MARKERS:                                7.6    12.14 )-----------( 183      ( 06 Mar 2025 09:51 )             24.7     03-05    131[L]  |  94[L]  |  23  ----------------------------<  115[H]  4.1   |  26  |  3.28[H]    Ca    7.0[L]      05 Mar 2025 10:21      proBNP:   Lipid Profile:   HgA1c:   TSH:             TELEMETRY: 	    ECG:  	  RADIOLOGY:   DIAGNOSTIC TESTING:  [ ] Echocardiogram:  [ ]  Catheterization:  [ ] Stress Test:    OTHER:

## 2025-03-06 NOTE — PROGRESS NOTE ADULT - PROBLEM SELECTOR PLAN 2
Nondisplaced L medial femoral condyle fx   s/p RLE angiogram w/ b/l iliac artery stents 3/3  ortho following  Wound from Left knee immobilizer is dressed and healing.  New ecchymoses in b/l feet  As per vascular: no further revascularization options. recommend podiatry consult given c/f toes, foot is ok

## 2025-03-07 NOTE — PROGRESS NOTE ADULT - SUBJECTIVE AND OBJECTIVE BOX
Neurology      S; patient seen. no neuro changes          Medications: MEDICATIONS  (STANDING):  albuterol/ipratropium for Nebulization 3 milliLiter(s) Nebulizer every 6 hours  aspirin  chewable 81 milliGRAM(s) Oral daily  atorvastatin 40 milliGRAM(s) Oral at bedtime  chlorhexidine 2% Cloths 1 Application(s) Topical daily  clopidogrel Tablet 75 milliGRAM(s) Oral daily  epoetin aleah-epbx (RETACRIT) Injectable 87418 Unit(s) IV Push <User Schedule>  guaiFENesin ER 1200 milliGRAM(s) Oral every 12 hours  hydrOXYzine hydrochloride 25 milliGRAM(s) Oral three times a day  ibuprofen  Tablet. 400 milliGRAM(s) Oral every 12 hours  oxyCODONE  ER Tablet 30 milliGRAM(s) Oral every 12 hours  pantoprazole    Tablet 40 milliGRAM(s) Oral before breakfast  polyethylene glycol 3350 17 Gram(s) Oral daily  senna 2 Tablet(s) Oral at bedtime  sodium phosphate 15 milliMole(s)/250 mL IVPB 15 milliMole(s) IV Intermittent once    MEDICATIONS  (PRN):  acetaminophen     Tablet .. 650 milliGRAM(s) Oral every 6 hours PRN Temp greater or equal to 38C (100.4F), Mild Pain (1 - 3)  bisacodyl 5 milliGRAM(s) Oral every 12 hours PRN Constipation  HYDROmorphone  Injectable 2 milliGRAM(s) IV Push every 6 hours PRN Severe Pain (7 - 10)  oxyCODONE    IR 10 milliGRAM(s) Oral every 6 hours PRN Moderate Pain (4 - 6)  sodium chloride 0.9% lock flush 10 milliLiter(s) IV Push every 1 hour PRN Pre/post blood products, medications, blood draw, and to maintain line patency       Vitals:  Vital Signs Last 24 Hrs  T(C): 36.8 (07 Mar 2025 16:25), Max: 36.8 (07 Mar 2025 16:25)  T(F): 98.2 (07 Mar 2025 16:25), Max: 98.2 (07 Mar 2025 16:25)  HR: 82 (07 Mar 2025 16:25) (79 - 90)  BP: 130/65 (07 Mar 2025 16:25) (122/75 - 151/73)  BP(mean): --  RR: 18 (07 Mar 2025 16:25) (18 - 18)  SpO2: 95% (07 Mar 2025 16:25) (94% - 96%)    Parameters below as of 07 Mar 2025 16:25  Patient On (Oxygen Delivery Method): room air                        General Appearance: Appropriately dressed and in no acute distress       Head: Normocephalic, atraumatic and no dysmorphic features  Ear, Nose, and Throat: Moist mucous membranes  CVS: S1S2+  Resp: No SOB, no wheeze or rhonchi  GI: soft NT/ND  Extremities: LE PVD : dusky toes noted   Skin: + decub      Neurological Exam:  Mental Status: Awake, alert and oriented x 2.  Able to follow simple and complex verbal commands. Able to name and repeat. fluent speech. No obvious aphasia or dysarthria noted.   Cranial Nerves: PERRL, EOMI, VFFC, sensation V1-V3 intact,  no obvious facial asymmetry, equal elevation of palate, scm/trap 5/5, tongue is midline on protrusion. no obvious papilledema on fundoscopic exam. hearing is grossly intact.   Motor: Normal bulk, tone and strength throughout uppers 4/ lowers 0-/1 5   Sensation: Intact to light touch and pinprick throughout.     Coordination: No dysmetria on FNF   Gait: deferred, wheelchair bound     Data/Labs/Imaging which I personally reviewed.          LABS:                          9.0    14.13 )-----------( 216      ( 07 Mar 2025 08:57 )             29.7     03-07    135  |  96  |  20  ----------------------------<  100[H]  3.7   |  26  |  2.90[H]    Ca    7.0[L]      07 Mar 2025 08:57  Phos  1.7     03-07  Mg     1.9     03-07          Urinalysis Basic - ( 07 Mar 2025 08:57 )    Color: x / Appearance: x / SG: x / pH: x  Gluc: 100 mg/dL / Ketone: x  / Bili: x / Urobili: x   Blood: x / Protein: x / Nitrite: x   Leuk Esterase: x / RBC: x / WBC x   Sq Epi: x / Non Sq Epi: x / Bacteria: x                < from: CT Head No Cont (02.20.25 @ 18:47) >    ACC: [id number] EXAM:  CT BRAIN   ORDERED BY: GUY JONO     PROCEDURE DATE:  02/20/2025          INTERPRETATION:  CLINICAL INDICATION: Altered mental status    TECHNIQUE: Axial CT scanning of the brain was obtained from the skull   base to the vertex without the administration of intravenous contrast.   Reformatted coronal and sagittal images were subsequently obtained and   reviewed.    COMPARISON: None    FINDINGS:  There is no CT evidence of acute transcortical infarct. Age-related   involutional changes and chronic microvascular ischemic changes.    There is no hydrocephalus, mass effect, or acute intracranial hemorrhage.   No extra-axial collection. Basal cisterns are patent.    The visualized paranasal sinuses and mastoid air cells are clear.    The calvarium is intact.    IMPRESSION:  No evidence of acute transcortical infarct, acute intracranial   hemorrhage, or mass effect.    --- End of Report ---            CORTNEY REARDON MD; Attending Radiologist  This document has been electronically signed. Feb 20 2025  7:07PM    < end of copied text >

## 2025-03-07 NOTE — PROGRESS NOTE ADULT - SUBJECTIVE AND OBJECTIVE BOX
Overnight events noted      VITAL:  T(C): , Max: 36.7 (03-06-25 @ 16:08)  T(F): , Max: 98.1 (03-06-25 @ 16:08)  HR: 79 (03-07-25 @ 08:32)  BP: 122/75 (03-07-25 @ 08:32)  BP(mean): --  RR: 18 (03-07-25 @ 08:32)  SpO2: 94% (03-07-25 @ 08:32)  Wt(kg): --      PHYSICAL EXAM:  Constitutional: lethargic, NAD  HEENT: NCAT, DMM  Neck: Supple, No JVD  Respiratory: CTA-b/l  Cardiovascular: RRR s1s2, no m/r/g  Gastrointestinal: BS+, soft, NT/ND  Extremities: No peripheral edema b/l  Neurological: reduced generalized strength  Back: no CVAT b/l  Skin: (+)cyanotic changes of toes b/l feet  Access: RIJ tunneled cath-accessed      LABS:                        9.0    14.13 )-----------( 216      ( 07 Mar 2025 08:57 )             29.7     Na(135)/K(3.7)/Cl(96)/HCO3(26)/BUN(20)/Cr(2.90)Glu(100)/Ca(7.0)/Mg(1.9)/PO4(1.7)    03-07 @ 08:57  Na(131)/K(4.1)/Cl(94)/HCO3(26)/BUN(23)/Cr(3.28)Glu(115)/Ca(7.0)/Mg(--)/PO4(--)    03-05 @ 10:21      IMPRESSION: 74M w/ polio, neurogenic bladder/suprapubic catheter, 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 Last dialyzed yesterday; we can plan for next HD tomorrow. Planning for outpatient HD MWF - we can switch to MWF next week    (2)Hyponatremia - improved, with high-Na+ HD bath    (3)Hypophosphatemia - remains hypophosphatemic, despite disconitnuation of PO4 binder and despite administration of IV sodium phosphate 3/3/25. We can repeat the IV sodium phosphate today, and we can remove the dietary PO4 restriction from his diet.    (4)Anemia - s/p IV iron; on Retacrit with HD    (5)PAD - POD#3 s/p LE bypass - cyanotic changes of toes b/l      RECOMMEND:   (1)Sodium phosphate, 15mmol IV x 1 today  (2)Removed dietary PO4 restriction  (3)Next HD tomorrow - no net UF - high-Na+ bath; Retacrit with HD; revert to MWF HD next week        Rafita Denny MD  [facility]  Office/on call physician: [phone]  Cell (7a-7p): [phone]       No pain, no sob  Wife at bedside    VITAL:  T(C): , Max: 36.7 (03-06-25 @ 16:08)  T(F): , Max: 98.1 (03-06-25 @ 16:08)  HR: 79 (03-07-25 @ 08:32)  BP: 122/75 (03-07-25 @ 08:32)  BP(mean): --  RR: 18 (03-07-25 @ 08:32)  SpO2: 94% (03-07-25 @ 08:32)  Wt(kg): --      PHYSICAL EXAM:  Constitutional: alert, NAD  HEENT: NCAT, DMM  Neck: Supple, No JVD  Respiratory: CTA-b/l  Cardiovascular: RRR s1s2, no m/r/g  Gastrointestinal: BS+, soft, NT/ND  Extremities: No peripheral edema b/l  Neurological: reduced generalized strength  Back: no CVAT b/l  Skin: (+)cyanotic changes b/l feet  Access: RIJ tunneled cath      LABS:                        9.0    14.13 )-----------( 216      ( 07 Mar 2025 08:57 )             29.7     Na(135)/K(3.7)/Cl(96)/HCO3(26)/BUN(20)/Cr(2.90)Glu(100)/Ca(7.0)/Mg(1.9)/PO4(1.7)    03-07 @ 08:57  Na(131)/K(4.1)/Cl(94)/HCO3(26)/BUN(23)/Cr(3.28)Glu(115)/Ca(7.0)/Mg(--)/PO4(--)    03-05 @ 10:21      IMPRESSION: 74M w/ polio, neurogenic bladder/suprapubic catheter, 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 Last dialyzed yesterday; we can plan for next HD tomorrow. Planning for outpatient HD MWF - we can switch to MWF next week    (2)Hyponatremia - improved, with high-Na+ HD bath    (3)Hypophosphatemia - remains hypophosphatemic, despite disconitnuation of PO4 binder and despite administration of IV sodium phosphate 3/3/25. We can repeat the IV sodium phosphate today, and we can remove the dietary PO4 restriction from his diet.    (4)Anemia - s/p IV iron; on Retacrit with HD    (5)PAD - POD#3 s/p LE bypass - cyanotic changes of toes b/l      RECOMMEND:   (1)Sodium phosphate, 15mmol IV x 1 today  (2)Removed dietary PO4 restriction  (3)Next HD tomorrow - no net UF - high-Na+ bath; Retacrit with HD; revert to MWF HD next week        Rafita Denny MD  [facility]  Office/on call physician: [phone]  Cell (7a-7p): [phone]

## 2025-03-07 NOTE — PROGRESS NOTE ADULT - ASSESSMENT
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 He is well known to me from multiple recent admisions  s/p admissions at [facility] 12/20-12/25/24 and 2/4-2/7 with SONDRA on CKD with associated uremic symptoms.   At the last admission he had expressed strong interest to try to hold off with HD intiation but he has been getting worse clinically at home.  His SONDRA and uremic symptoms significantly improved after the first admission; they improved a to mild extent during the 2nd admission to the point where he was able to be discharged without HD. Since then, however, he has worsened further.   He has been suffering from progressively worsening diffuse pruritus, loss of appetite, and generalized weakness and falls.   His nephrologist and PCP has been speaking with him and his family over the past few days,   The initial plan was that he would present to the [facility] ER Monday 2/17 (ie tomorrow) for HD initiation. Today, however, he fell and sustained trauma to his knee. Given the fall and concern for knee fracture, he presented to the ER today. multiple xrays show no Fractures.      CKD5, uremia, requiring initiation of HD  Rash, seborrheic dermatitis  Pruritis  Anemia  PAD  SP catheter, chronic  Left inferior pole acute on chronic patella Fx    plan  - HD via R subclavian permacath  - 3/7: ptn is alert , pain is controlled, DC planning d/w ptn and HCP, daughter Yanique  -3/6:  ptn is awake, alert, ecchymotic feet look improved, pain is controlled. DC planning to [facility]  3/4-5 - s/p b/l iliac arteries angioplasty and stent placements on 3/3. today has new ecchymoses in b/l LE, concern for arterial insufficiency. vascular aware.. wound from Left knee immobilizer is dressed and healing. pain is controlled.   LEFT UE AVF placement/scheduling will be on outptn basis as per vascular. dc planning to [facility]  - 3/3: ptn is s/p angiogram RLE : b/l iliac arteries w successful angioplasty and stents. in PACU. awaitng HD.  ptn has a pressure wound from the LLE immobilizer posteriorly proximal to the knee. its been on 2/2 knee fracture, applied by ortho and managed by ptn's wife as per ptn's and wife's request , this was d/w nursing and nurse manager ms Ruiz.. immobilizer should be managed by orthopedics and nursing. will keep it off, only keep on when repositioning for care and then remove. wound care to F/U . this was discussed at length w daughter Yanique and wife Dee Dee.   -3/1-3/2: ptn is smiling, pain free, had HD 2/28 and 3/1, awaiting RLE angiogram 3/3, on Heparin drip, euvolemic  -2/28: ptn is lethargic, awaiting RLE angiogram possible fem-fem bypass hopefully on 3/3 pending OR availability, awaiting HD today    -2/27:  plan for angiogram in am with possible angioplasty, possible stent, possible fem-fem bypass. medically cleared for angiogram and possible surgery, stent, angioplasty. pain is controlled. remains on heparin drip as per vascular. HD as per renal    -2/26:  ptn is sleeping, pain is controlled, he was seen by wound care and vascular attending. planning on angiogram 2/2 severe PAD in LE on CTA. he also spoke to HCP. ptn and HCP in agreement. ptn was started on HEPARIN drip as per vascular recs. RIJ permacath done 2/25    - 2/25: ptn states he is hallucinating, but not at present, his MS is at baseline, his pain is controlled, he still needs prn pain meds when he is moved in the bed or for testing or for HD. awaiting Permacath, AVF creation, LE bypass to be d/w Dr. Swenson and the ptn tomorrow. ptn has cardiology clearance  if he opts for. Ptn has sacral decubiti and posterior thigh and buttock decibiti and b/l heel decubiti. Z flow bootie d/w RN, get wound care consult    -2/24: pain is controlled  if the LLE is immobile and fully extended. doesn't tolerate the knee immobilizer. has a medium condyle and acute on chronic patella fx in LEFT knee. as per vascular ptn will need iliac stent  w a fem-fem bypass, possible axillo-femoral bypass. for this surgery he would need cardiac work up . more pressing surgery is LUE AVF creation,  in IR will arrange for Permacath. for pain control: Motrin 400 mg q12H, Oxy ER 30 mg q12H, Oxy IR 10 for mod pain and dilaudid 2 mg iv for severe pain. still has pruritis though improved, will raise atarax 25 mg to tid. plan of care d/w daughter Norma Persaud , ptn and his wife. Also d/w renal, card, vascular, ACP    -2/23: Daughter Yanique is the HCP, she and the ptn filled out the paperwork. daily plan and findings d/w her and the ptn. MS is at abseline, c/o b/l LE foot pain and Left Knee pain. xrays of left knee: cannot r/o acute on chronic inferior pole patellar Fx. knee immobilizer is on, awaiting ortho consult. doubt needs any intervention awaiting Ct chest today, will add CT Left knee. ptn states itching has recurred, severe pain has recurred. will resume Atatrax ( 25 mg bid) and Oxycodone ER , but at a lower dose 15 mg q12H. cont prn analgesics. HD as per renal, awaiting Vascular consult f/u re CTA A/L &LE and recs. ptn also wants AVF surgery on this admission. Coughing has stopped    - 2/22: ptn is drowsy but arousable, calmer today, answers questions appropriately. he is pain free, he stopped coughing, he denies having pruritis: will DC:  OXY ER, Atarax, Tessalon perles.     -  2/21: ptn is tearful, a bit confused, coughing, recognizes me and family at bedside. GOC d/w daughter and wife. AMS prob delirium 2/2 acute illness +/- opiods, lyrica, atarac. will lower atarak to tid, dc lyrica, cont Oxy 2/2 ptn has severe LE pain. neuro called for eval. Head ct done yesterday w no acute findings. CTA A/P w LE run fof: severe PAD, vascular to follow up on plan fo care. plan for HD tomorrow and next week place on tiw schedule of MWF. will need tunneled catheter prior to DC and will d/w vascular scheduling of AVF. ptn wants all to be done while inptn. daughter wants to be HCP as per ptn's wishes. she was given paperwork to get it signed and witnessed and will present to nursing thereafter. details of findings and complaints and plan of care d/w daughter and wife. spent 60 min. RVP ordered. start mucinex , tessalon perles, duonebs, get CT chest to r/o PNA. pulm called    -  2/20:  ptn had RRT 2/2 AMS and tremors. no SZ, no LOC. noted to have Na 123( hyponatremia), given 500 cc NS. Gabapentin DCed. ptn had been taking gabapentin at home PTA. Pain is controlled. Pruritis resolved, tolerating HD otherwise.     - Pain management:  cont Oxycodone 10 IR q6H,  DIlaudid prn severe pain 2 mg q4H prn.   - cont outptn meds  - DVT ppx w HSC

## 2025-03-07 NOTE — PROGRESS NOTE ADULT - SUBJECTIVE AND OBJECTIVE BOX
Date of Service: 03-07-25 @ 10:08    Patient is a 74y old  Male who presents with a chief complaint of ESRD requiring HD, uremia (06 Mar 2025 12:05)      Any change in ROS:   No new respiratory events overnight. Denies SOB/CP.      MEDICATIONS  (STANDING):  albuterol/ipratropium for Nebulization 3 milliLiter(s) Nebulizer every 6 hours  aspirin  chewable 81 milliGRAM(s) Oral daily  atorvastatin 40 milliGRAM(s) Oral at bedtime  chlorhexidine 2% Cloths 1 Application(s) Topical daily  clopidogrel Tablet 75 milliGRAM(s) Oral daily  epoetin aleah-epbx (RETACRIT) Injectable 72353 Unit(s) IV Push <User Schedule>  guaiFENesin ER 1200 milliGRAM(s) Oral every 12 hours  hydrOXYzine hydrochloride 25 milliGRAM(s) Oral three times a day  ibuprofen  Tablet. 400 milliGRAM(s) Oral every 12 hours  oxyCODONE  ER Tablet 30 milliGRAM(s) Oral every 12 hours  pantoprazole    Tablet 40 milliGRAM(s) Oral before breakfast  polyethylene glycol 3350 17 Gram(s) Oral daily  senna 2 Tablet(s) Oral at bedtime    MEDICATIONS  (PRN):  acetaminophen     Tablet .. 650 milliGRAM(s) Oral every 6 hours PRN Temp greater or equal to 38C (100.4F), Mild Pain (1 - 3)  bisacodyl 5 milliGRAM(s) Oral every 12 hours PRN Constipation  HYDROmorphone  Injectable 2 milliGRAM(s) IV Push every 6 hours PRN Severe Pain (7 - 10)  oxyCODONE    IR 10 milliGRAM(s) Oral every 6 hours PRN Moderate Pain (4 - 6)  sodium chloride 0.9% lock flush 10 milliLiter(s) IV Push every 1 hour PRN Pre/post blood products, medications, blood draw, and to maintain line patency    Vital Signs Last 24 Hrs  T(C): 36.7 (07 Mar 2025 08:32), Max: 36.7 (06 Mar 2025 16:08)  T(F): 98.1 (07 Mar 2025 08:32), Max: 98.1 (06 Mar 2025 16:08)  HR: 79 (07 Mar 2025 08:32) (79 - 105)  BP: 122/75 (07 Mar 2025 08:32) (109/54 - 151/73)  BP(mean): --  RR: 18 (07 Mar 2025 08:32) (18 - 19)  SpO2: 94% (07 Mar 2025 08:32) (94% - 98%)    Parameters below as of 07 Mar 2025 08:32  Patient On (Oxygen Delivery Method): room air        I&O's Summary    06 Mar 2025 07:01  -  07 Mar 2025 07:00  --------------------------------------------------------  IN: 800 mL / OUT: 1225 mL / NET: -425 mL    07 Mar 2025 07:01  -  07 Mar 2025 10:08  --------------------------------------------------------  IN: 0 mL / OUT: 450 mL / NET: -450 mL          Physical Exam:   GENERAL: NAD, well-groomed, well-developed  HEENT: VINNY/   Atraumatic, Normocephalic  ENMT: No tonsillar erythema, exudates, or enlargement; Moist mucous membranes, Good dentition, No lesions  NECK: Supple, No JVD, Normal thyroid  CHEST/LUNG: Decreased BS at bases   CVS: Regular rate and rhythm; No murmurs, rubs, or gallops  GI: : Soft, Nontender, Nondistended; Bowel sounds present  NERVOUS SYSTEM:  Alert & Oriented X3  EXTREMITIES:  mottled toes   LYMPH: No lymphadenopathy noted  SKIN: No rashes or lesions  ENDOCRINOLOGY: No Thyromegaly  PSYCH: Appropriate    Labs:                              9.0    14.13 )-----------( 216      ( 07 Mar 2025 08:57 )             29.7                         7.6    12.14 )-----------( 183      ( 06 Mar 2025 09:51 )             24.7                         7.9    13.86 )-----------( 183      ( 05 Mar 2025 10:21 )             26.3                         9.5    14.41 )-----------( 177      ( 04 Mar 2025 06:56 )             31.4     03-07    135  |  96  |  20  ----------------------------<  100[H]  3.7   |  26  |  2.90[H]  03-05    131[L]  |  94[L]  |  23  ----------------------------<  115[H]  4.1   |  26  |  3.28[H]  03-04    126[L]  |  88[L]  |  32[H]  ----------------------------<  141[H]  4.9   |  20[L]  |  4.49[H]    Ca    7.0[L]      07 Mar 2025 08:57  Ca    7.0[L]      05 Mar 2025 10:21  Phos  1.7     03-07  Mg     1.9     03-07    TPro  5.9[L]  /  Alb  2.4[L]  /  TBili  0.1[L]  /  DBili  x   /  AST  21  /  ALT  7[L]  /  AlkPhos  159[H]  03-04    Urinalysis Basic - ( 07 Mar 2025 08:57 )    Color: x / Appearance: x / SG: x / pH: x  Gluc: 100 mg/dL / Ketone: x  / Bili: x / Urobili: x   Blood: x / Protein: x / Nitrite: x   Leuk Esterase: x / RBC: x / WBC x   Sq Epi: x / Non Sq Epi: x / Bacteria: x        Studies  < from: CT Chest No Cont (02.23.25 @ 16:46) >    ACC: [id number] EXAM:  CT CHEST   ORDERED BY:  NELL TOLBERT     PROCEDURE DATE:  02/23/2025          INTERPRETATION:  CLINICAL INFORMATION: 74-year-old male with cough    TECHNIQUE: A volumetric CT acquisition of the chest was obtained from the   thoracic inlet to the upper abdomen, without administration of   intravenous contrast. This CT scan was performed with one or more of the   following dose optimization: iterative reconstruction, automatic exposure   control, and/or manual adjustmentof mAs and kVp according to the   patient's size. 3D MIP and volume-rendered images were created at the   scanner.    COMPARISON: The study was compared to CT chest dated 2/23/2025    FINDINGS:  Lines and Tubes: There is a right-sided central venous catheter with the   tip terminating in distal right brachiocephalic vein.    Mediastinum: The thyroid and thoracic inlet are normal. There is no   enlarged axiliary, mediastinal, or hilar lymph nodes. The heart size is   within normal limits. There isno pericardial effusion. The aorta is   normal in course and caliber, with mild calcified atheromas. Lobar   pulmonary arteries have low attenuation, likely artifactual and due to   beam hardening. The esophagus is unremarkable.    Lung: The central tracheobronchial tree is patent. There is no focal   consolidation. There is mild peribronchial wall thickening most prominent   in the left lower lobe. There are mucoid impaction and airway associated   and subpleural groundglass opacities, more prominent in the left lower   lobe and to a lesser degree in right lower lobe..    Pleura: There is no pleural effusion or pneumothorax.    Abdomen: There is prominence of right renal pelvis. Cholelithiasis is   noted. There is no acute upper abdominal finding.    Bone and Soft Tissue: There is no evidence of osteosclerotic or   osteolytic lesions. There are multilevel degenerative changes of thoracic   spine , with disk space narrowing and osteophytosis. There is mild   subcutaneous edema in left lateral upper abdominal wall.    IMPRESSION:  Mucoid impaction and airway associated subpleural groundglass opacities,   most prominent in left lower lobe and to a lesser degree in right lower   lobe.  Mild bronchial wall thickening, likely inflammatory.      < end of copied text >

## 2025-03-07 NOTE — PROGRESS NOTE ADULT - ASSESSMENT
74 year old male with CKD, sp polio, paraplegia with associated neurogenic bladder s/p suprapubic catheter who was admitted s/p fall on 2/16.   CKD - ESRD, now s/p DEYA [place] 2/17, on HD  ruled out cellulitis around suprapubic cath  2/20 s/p RRT for tremors and confusion -- pt with chronic tremors although notably worse  hyponatremia    mrsa negative   2/20 CXR with clear lungs   SPC site with chronic/stable inflammatory changes, no drainage - no sign of SSTI  CTA abd with occlusion of b/l external iliac arteries and b/l superficial femoral arteries with distal reconstitution at popliteal arteries; patent three vessel runoff of RLE with 2 vessel runoff of LLE with occlusion of L mid anterior tibial artery with distal reconstitution  Bcx negative to date   mental status at baseline  WBC noted post op-downtrending, remains afebrile   Vascular following for worsening PAD with worsening ischemic changes   -3/3 s/p RLE angiogram b/l iliac artery stents     s/p vancomycin x1 2/20  s/p zosyn 2/20-2/22      Recommendations:   Continue off antibiotics   Vascular surgery following  HD per renal    Monitor temps/WBC  Continue rest of care per primary team       Bridgette Ramirez M.D.  [facility]  Available on [employer] TEAMS - *PREFERRED*  [phone]  After 5pm on weekdays and all day on weekends - please call [phone]     Thank you for consulting us and involving us in the management of this patients case. In addition to reviewing history, imaging, documents, labs, microbiology, took into account antibiotic stewardship, local antibiogram and infection control strategies and potential transmission issues at time of treatment decision making process.

## 2025-03-07 NOTE — PROGRESS NOTE ADULT - PROBLEM SELECTOR PLAN 2
Nondisplaced L medial femoral condyle fx   s/p RLE angiogram w/ b/l iliac artery stents 3/3  ortho following  Wound from Left knee immobilizer is dressed and healing.  New ecchymoses in b/l feet  As per vascular: no further revascularization options.  podiatry consulted:  No acute podiatric surgical intervention at this time Nondisplaced L medial femoral condyle fx   s/p RLE angiogram w/ b/l iliac artery stents 3/3  ortho following  Wound from Left knee immobilizer is dressed and healing.  New ecchymoses in b/l feet  As per vascular: no further revascularization options.  podiatry consulted:  No acute podiatric surgical intervention at this time.

## 2025-03-07 NOTE — PROGRESS NOTE ADULT - SUBJECTIVE AND OBJECTIVE BOX
Patient is a 74y old  Male who presents with a chief complaint of ESRD requiring HD, uremia (07 Mar 2025 10:34)      SUBJECTIVE / OVERNIGHT EVENTS: ptn is alert , pain is controlled, DC planning d/w ptn and HCP, daughter Yanique    MEDICATIONS  (STANDING):  albuterol/ipratropium for Nebulization 3 milliLiter(s) Nebulizer every 6 hours  aspirin  chewable 81 milliGRAM(s) Oral daily  atorvastatin 40 milliGRAM(s) Oral at bedtime  chlorhexidine 2% Cloths 1 Application(s) Topical daily  clopidogrel Tablet 75 milliGRAM(s) Oral daily  epoetin aleah-epbx (RETACRIT) Injectable 80608 Unit(s) IV Push <User Schedule>  guaiFENesin ER 1200 milliGRAM(s) Oral every 12 hours  hydrOXYzine hydrochloride 25 milliGRAM(s) Oral three times a day  ibuprofen  Tablet. 400 milliGRAM(s) Oral every 12 hours  oxyCODONE  ER Tablet 30 milliGRAM(s) Oral every 12 hours  pantoprazole    Tablet 40 milliGRAM(s) Oral before breakfast  polyethylene glycol 3350 17 Gram(s) Oral daily  senna 2 Tablet(s) Oral at bedtime    MEDICATIONS  (PRN):  acetaminophen     Tablet .. 650 milliGRAM(s) Oral every 6 hours PRN Temp greater or equal to 38C (100.4F), Mild Pain (1 - 3)  bisacodyl 5 milliGRAM(s) Oral every 12 hours PRN Constipation  HYDROmorphone  Injectable 2 milliGRAM(s) IV Push every 6 hours PRN Severe Pain (7 - 10)  oxyCODONE    IR 10 milliGRAM(s) Oral every 6 hours PRN Moderate Pain (4 - 6)  sodium chloride 0.9% lock flush 10 milliLiter(s) IV Push every 1 hour PRN Pre/post blood products, medications, blood draw, and to maintain line patency      Vital Signs Last 24 Hrs  T(F): 98.1 (03-07-25 @ 08:32), Max: 98.1 (03-06-25 @ 16:08)  HR: 79 (03-07-25 @ 08:32) (79 - 105)  BP: 122/75 (03-07-25 @ 08:32) (109/70 - 151/73)  RR: 18 (03-07-25 @ 08:32) (18 - 19)  SpO2: 94% (03-07-25 @ 08:32) (94% - 97%)  Telemetry:   CAPILLARY BLOOD GLUCOSE        I&O's Summary    06 Mar 2025 07:01  -  07 Mar 2025 07:00  --------------------------------------------------------  IN: 800 mL / OUT: 1225 mL / NET: -425 mL    07 Mar 2025 07:01  -  07 Mar 2025 13:27  --------------------------------------------------------  IN: 0 mL / OUT: 450 mL / NET: -450 mL        PHYSICAL EXAM:  GENERAL: NAD, well-developed  HEAD:  Atraumatic, Normocephalic  EYES: EOMI, PERRLA, conjunctiva and sclera clear  NECK: Supple, No JVD  CHEST/LUNG: Clear to auscultation bilaterally; No wheeze  HEART: Regular rate and rhythm; No murmurs, rubs, or gallops  ABDOMEN: Soft, Nontender, Nondistended; Bowel sounds present  EXTREMITIES:  2+ Peripheral Pulses, No clubbing, cyanosis, or edema  PSYCH: AAOx3  NEUROLOGY: non-focal  SKIN: No rashes or lesions    LABS:                        9.0    14.13 )-----------( 216      ( 07 Mar 2025 08:57 )             29.7     03-07    135  |  96  |  20  ----------------------------<  100[H]  3.7   |  26  |  2.90[H]    Ca    7.0[L]      07 Mar 2025 08:57  Phos  1.7     03-07  Mg     1.9     03-07            Urinalysis Basic - ( 07 Mar 2025 08:57 )    Color: x / Appearance: x / SG: x / pH: x  Gluc: 100 mg/dL / Ketone: x  / Bili: x / Urobili: x   Blood: x / Protein: x / Nitrite: x   Leuk Esterase: x / RBC: x / WBC x   Sq Epi: x / Non Sq Epi: x / Bacteria: x        RADIOLOGY & ADDITIONAL TESTS:    Imaging Personally Reviewed:    Consultant(s) Notes Reviewed:      Care Discussed with Consultants/Other Providers:

## 2025-03-07 NOTE — PROGRESS NOTE ADULT - SUBJECTIVE AND OBJECTIVE BOX
[facility]  SABINO Griffin Y. Patel, S. Shah, G. Casimir  [phone]  ([phone] - weekdays after 5pm and weekends)    Name: BRIAN DEMPSEY  Age/Gender: 74y Male  MRN: [id number]    Interval History:  Patient seen and examined this morning.   No new complaints noted.  Notes reviewed  No concerning overnight events  Afebrile   Allergies: No Known Allergies      Objective:  Vitals:   T(F): 98.1 (03-07-25 @ 08:32), Max: 98.1 (03-06-25 @ 16:08)  HR: 79 (03-07-25 @ 08:32) (79 - 105)  BP: 122/75 (03-07-25 @ 08:32) (109/70 - 151/73)  RR: 18 (03-07-25 @ 08:32) (18 - 19)  SpO2: 94% (03-07-25 @ 08:32) (94% - 97%)  Physical Examination:  General: no acute distress, RA  HEENT: normocephalic, atraumatic  Respiratory: no acc muscle use, breathing comfortably  Cardiovascular: S1 and S2 present  Gastrointestinal: normal appearing, nondistended  Extremities: b/l feet with ischemic changes    Laboratory Studies:  CBC:                       9.0    14.13 )-----------( 216      ( 07 Mar 2025 08:57 )             29.7     WBC Trend:  14.13 03-07-25 @ 08:57  12.14 03-06-25 @ 09:51  13.86 03-05-25 @ 10:21  14.41 03-04-25 @ 06:56  11.77 03-03-25 @ 07:13  12.22 03-03-25 @ 01:12  16.50 03-02-25 @ 07:28  10.79 03-01-25 @ 07:02    CMP: 03-07    135  |  96  |  20  ----------------------------<  100[H]  3.7   |  26  |  2.90[H]    Ca    7.0[L]      07 Mar 2025 08:57  Phos  1.7     03-07  Mg     1.9     03-07      Creatinine: 2.90 mg/dL (03-07-25 @ 08:57)  Creatinine: 3.28 mg/dL (03-05-25 @ 10:21)  Creatinine: 4.49 mg/dL (03-04-25 @ 06:55)  Creatinine: 4.49 mg/dL (03-04-25 @ 06:55)  Creatinine: 4.11 mg/dL (03-03-25 @ 01:12)  Creatinine: 5.69 mg/dL (03-01-25 @ 07:01)    Microbiology: reviewed   Radiology: reviewed     Medications:  acetaminophen     Tablet .. 650 milliGRAM(s) Oral every 6 hours PRN  albuterol/ipratropium for Nebulization 3 milliLiter(s) Nebulizer every 6 hours  aspirin  chewable 81 milliGRAM(s) Oral daily  atorvastatin 40 milliGRAM(s) Oral at bedtime  bisacodyl 5 milliGRAM(s) Oral every 12 hours PRN  chlorhexidine 2% Cloths 1 Application(s) Topical daily  clopidogrel Tablet 75 milliGRAM(s) Oral daily  epoetin aleah-epbx (RETACRIT) Injectable 26918 Unit(s) IV Push <User Schedule>  guaiFENesin ER 1200 milliGRAM(s) Oral every 12 hours  HYDROmorphone  Injectable 2 milliGRAM(s) IV Push every 6 hours PRN  hydrOXYzine hydrochloride 25 milliGRAM(s) Oral three times a day  ibuprofen  Tablet. 400 milliGRAM(s) Oral every 12 hours  oxyCODONE    IR 10 milliGRAM(s) Oral every 6 hours PRN  oxyCODONE  ER Tablet 30 milliGRAM(s) Oral every 12 hours  pantoprazole    Tablet 40 milliGRAM(s) Oral before breakfast  polyethylene glycol 3350 17 Gram(s) Oral daily  senna 2 Tablet(s) Oral at bedtime  sodium chloride 0.9% lock flush 10 milliLiter(s) IV Push every 1 hour PRN    Prior/Completed Antimicrobials:  piperacillin/tazobactam IVPB.  piperacillin/tazobactam IVPB.-  vancomycin  IVPB

## 2025-03-07 NOTE — PROGRESS NOTE ADULT - ASSESSMENT
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  came in after fall and for HD.     2/20 CTH neg   \Na 123 --> now 133   A1c 5.7   TSH WNL 3.46   o/e 2/21 AAOx2, uppers 4/5, lowers limited .  2/23; family bedside upset that he has pain in leg after he was moved.  patient going for imaging now.   s/p R IJ permacath by IR 2/25 2/27 AAOx3     Imrpssion  1) AMS, waxing and waing , likely multifactorial from infection, metabolic/electrolyte derrangements/ delerium / medication effect , ureemia which is imporving   2) polio  3) fall 2/2 weakness  4) hyponatremia to 123 2/20  improved 133 -->136    - f/u podiatry./ f/u vascular   - monitor puleses in distal LE; f/u vascular    - eventually AVF  - f/u vascular; no vascluar options   - on DAPT   - was getting oxycodone ER 30mg BID and oxy PRN IR i10 and dilauded PRN ;   - f/u psych   - limit sedating meds   - continue to monitor and correct metabolic derrangements .  - delerium precuations.   - frequent re orientation  - check b12, RPR, ammonia level   - will consdier MRI brain or EEG if doesn't improve but seems to have been improving   - PT/OT   - check FS, glucose control <180  - GI/DVT ppx  - Thank you for allowing me to participate in the care of this patient. Call with questions.    spoke with wife 3/6   Paul Limon MD  Vascular Neurology  Office: [phone]

## 2025-03-07 NOTE — PROGRESS NOTE ADULT - ASSESSMENT
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 The initial plan was that he would present to the [facility] ER Monday 2/17 for HD initiation. However, day of admission, he fell and sustained trauma to his knee. Called to consult for cough, elevated R hemidiaphragm.

## 2025-03-07 NOTE — PROGRESS NOTE ADULT - SUBJECTIVE AND OBJECTIVE BOX
Subjective: Patient seen and examined. No new events except as noted.     REVIEW OF SYSTEMS:    CONSTITUTIONAL:+ weakness, fevers or chills  EYES/ENT: No visual changes;  No vertigo or throat pain   NECK: No pain or stiffness  RESPIRATORY: No cough, wheezing, hemoptysis; No shortness of breath  CARDIOVASCULAR: No chest pain or palpitations  GASTROINTESTINAL: No abdominal or epigastric pain. No nausea, vomiting, or hematemesis; No diarrhea or constipation. No melena or hematochezia.  GENITOURINARY: No dysuria, frequency or hematuria  NEUROLOGICAL: No numbness or weakness  SKIN: No itching, burning, rashes, or lesions   All other review of systems is negative unless indicated above.    MEDICATIONS:  MEDICATIONS  (STANDING):  albuterol/ipratropium for Nebulization 3 milliLiter(s) Nebulizer every 6 hours  aspirin  chewable 81 milliGRAM(s) Oral daily  atorvastatin 40 milliGRAM(s) Oral at bedtime  chlorhexidine 2% Cloths 1 Application(s) Topical daily  clopidogrel Tablet 75 milliGRAM(s) Oral daily  epoetin aleah-epbx (RETACRIT) Injectable 12953 Unit(s) IV Push <User Schedule>  guaiFENesin ER 1200 milliGRAM(s) Oral every 12 hours  hydrOXYzine hydrochloride 25 milliGRAM(s) Oral three times a day  ibuprofen  Tablet. 400 milliGRAM(s) Oral every 12 hours  oxyCODONE  ER Tablet 30 milliGRAM(s) Oral every 12 hours  pantoprazole    Tablet 40 milliGRAM(s) Oral before breakfast  polyethylene glycol 3350 17 Gram(s) Oral daily  senna 2 Tablet(s) Oral at bedtime      PHYSICAL EXAM:  T(C): 36.7 (03-07-25 @ 08:32), Max: 36.7 (03-06-25 @ 16:08)  HR: 79 (03-07-25 @ 08:32) (79 - 105)  BP: 122/75 (03-07-25 @ 08:32) (109/54 - 151/73)  RR: 18 (03-07-25 @ 08:32) (18 - 19)  SpO2: 94% (03-07-25 @ 08:32) (94% - 98%)  Wt(kg): --  I&O's Summary    06 Mar 2025 07:01  -  07 Mar 2025 07:00  --------------------------------------------------------  IN: 800 mL / OUT: 1225 mL / NET: -425 mL    07 Mar 2025 07:01  -  07 Mar 2025 10:37  --------------------------------------------------------  IN: 0 mL / OUT: 450 mL / NET: -450 mL              Appearance: NAD  HEENT:  Dry oral mucosa, PERRL, EOMI	  Lymphatic: No lymphadenopathy  Cardiovascular: Normal S1 S2, No JVD, No murmurs, No edema  Respiratory: Lungs clear to auscultation	  Psychiatry: A & O x 3, Mood & affect appropriate  Skin: No rashes, No ecchymoses, No cyanosis	  Neurologic: Non-focal  GI/Abd: Soft, obese, nontender. Dressing to [facility] C/D/I. Suprapubic catheter in place  Ext: Palp femoral pulses bilat. BLE atrophic, minimal dorsi/plantarflexion bilaterally. Sensation grossly intact. LLE edematous compared to R, erythema to right toes/forefoot. mottling of right toes/forefoot/heel  LLE:  small superficial abrasion, +edema and +ecchymosis over L knee  +TTP over L knee, no TTP along remainder of extremity; compartments soft  Limited ROM at knee 2/2 pain  No gross varus/valgus laxity, but assessment limited 2/2 pain  Motor: TA/EHL/GS/FHL intact  Sensory: DP/SP/Tib/Jordan/Saph SILT  +DP pulse (symmetric relative to contralateral side), WWP   pressure wound from the LLE immobilizer posteriorly proximal to the knee.  Vascular: Peripheral pulses palpable 2+ bilaterally      LABS:    CARDIAC MARKERS:                                9.0    14.13 )-----------( 216      ( 07 Mar 2025 08:57 )             29.7     03-07    135  |  96  |  20  ----------------------------<  100[H]  3.7   |  26  |  2.90[H]    Ca    7.0[L]      07 Mar 2025 08:57  Phos  1.7     03-07  Mg     1.9     03-07      proBNP:   Lipid Profile:   HgA1c:   TSH:             TELEMETRY: 	    ECG:  	  RADIOLOGY:   DIAGNOSTIC TESTING:  [ ] Echocardiogram:  [ ]  Catheterization:  [ ] Stress Test:    OTHER:

## 2025-03-08 NOTE — PROGRESS NOTE ADULT - ASSESSMENT
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 He is well known to me from multiple recent admisions  s/p admissions at [facility] 12/20-12/25/24 and 2/4-2/7 with SONDRA on CKD with associated uremic symptoms.   At the last admission he had expressed strong interest to try to hold off with HD intiation but he has been getting worse clinically at home.  His SONDRA and uremic symptoms significantly improved after the first admission; they improved a to mild extent during the 2nd admission to the point where he was able to be discharged without HD. Since then, however, he has worsened further.   He has been suffering from progressively worsening diffuse pruritus, loss of appetite, and generalized weakness and falls.   His nephrologist and PCP has been speaking with him and his family over the past few days,   The initial plan was that he would present to the [facility] ER Monday 2/17 (ie tomorrow) for HD initiation. Today, however, he fell and sustained trauma to his knee. Given the fall and concern for knee fracture, he presented to the ER today. multiple xrays show no Fractures.      CKD5, uremia, requiring initiation of HD  Rash, seborrheic dermatitis  Pruritis  Anemia  PAD  SP catheter, chronic  Left inferior pole acute on chronic patella Fx    plan  - HD via R subclavian permacath  -3/8:  ptn didnt want to get PT eval done, will reorder. ptn needs NYDIA placement. PMNR consult ordered  - 3/7: ptn is alert , pain is controlled, DC planning d/w ptn and HCP, daughter Yanique  -3/6:  ptn is awake, alert, ecchymotic feet look improved, pain is controlled. DC planning to NYDIA  3/4-5 - s/p b/l iliac arteries angioplasty and stent placements on 3/3. today has new ecchymoses in b/l LE, concern for arterial insufficiency. vascular aware.. wound from Left knee immobilizer is dressed and healing. pain is controlled.   LEFT UE AVF placement/scheduling will be on outptn basis as per vascular. dc planning to NYDIA  - 3/3: ptn is s/p angiogram RLE : b/l iliac arteries w successful angioplasty and stents. in PACU. awaitng HD.  ptn has a pressure wound from the LLE immobilizer posteriorly proximal to the knee. its been on 2/2 knee fracture, applied by ortho and managed by ptn's wife as per ptn's and wife's request , this was d/w nursing and nurse manager ms Ruiz.. immobilizer should be managed by orthopedics and nursing. will keep it off, only keep on when repositioning for care and then remove. wound care to F/U . this was discussed at length w daughter Yanique and wife Dee Dee.   -3/1-3/2: ptn is smiling, pain free, had HD 2/28 and 3/1, awaiting RLE angiogram 3/3, on Heparin drip, euvolemic  -2/28: ptn is lethargic, awaiting RLE angiogram possible fem-fem bypass hopefully on 3/3 pending OR availability, awaiting HD today    -2/27:  plan for angiogram in am with possible angioplasty, possible stent, possible fem-fem bypass. medically cleared for angiogram and possible surgery, stent, angioplasty. pain is controlled. remains on heparin drip as per vascular. HD as per renal    -2/26:  ptn is sleeping, pain is controlled, he was seen by wound care and vascular attending. planning on angiogram 2/2 severe PAD in LE on CTA. he also spoke to HCP. ptn and HCP in agreement. ptn was started on HEPARIN drip as per vascular recs. RIJ permacath done 2/25    - 2/25: ptn states he is hallucinating, but not at present, his MS is at baseline, his pain is controlled, he still needs prn pain meds when he is moved in the bed or for testing or for HD. awaiting Permacath, AVF creation, LE bypass to be d/w Dr. Swenson and the ptn tomorrow. ptn has cardiology clearance  if he opts for. Ptn has sacral decubiti and posterior thigh and buttock decibiti and b/l heel decubiti. Z flow fabienne d/w RN, get wound care consult    -2/24: pain is controlled  if the LLE is immobile and fully extended. doesn't tolerate the knee immobilizer. has a medium condyle and acute on chronic patella fx in LEFT knee. as per vascular ptn will need iliac stent  w a fem-fem bypass, possible axillo-femoral bypass. for this surgery he would need cardiac work up . more pressing surgery is LUE AVF creation,  in IR will arrange for Permacath. for pain control: Motrin 400 mg q12H, Oxy ER 30 mg q12H, Oxy IR 10 for mod pain and dilaudid 2 mg iv for severe pain. still has pruritis though improved, will raise atarax 25 mg to tid. plan of care d/w daughter Norma Persaud , ptn and his wife. Also d/w renal, card, vascular, ACP    -2/23: Daughter Yanique is the HCP, she and the ptn filled out the paperwork. daily plan and findings d/w her and the ptn. MS is at abseline, c/o b/l LE foot pain and Left Knee pain. xrays of left knee: cannot r/o acute on chronic inferior pole patellar Fx. knee immobilizer is on, awaiting ortho consult. doubt needs any intervention awaiting Ct chest today, will add CT Left knee. ptn states itching has recurred, severe pain has recurred. will resume Atatrax ( 25 mg bid) and Oxycodone ER , but at a lower dose 15 mg q12H. cont prn analgesics. HD as per renal, awaiting Vascular consult f/u re CTA A/L &LE and recs. ptn also wants AVF surgery on this admission. Coughing has stopped    - 2/22: ptn is drowsy but arousable, calmer today, answers questions appropriately. he is pain free, he stopped coughing, he denies having pruritis: will DC:  OXY ER, Atarax, Tessalon perles.     -  2/21: ptn is tearful, a bit confused, coughing, recognizes me and family at bedside. GOC d/w daughter and wife. AMS prob delirium 2/2 acute illness +/- opiods, lyrica, atarac. will lower atarak to tid, dc lyrica, cont Oxy 2/2 ptn has severe LE pain. neuro called for eval. Head ct done yesterday w no acute findings. CTA A/P w LE run fof: severe PAD, vascular to follow up on plan fo care. plan for HD tomorrow and next week place on tiw schedule of MWF. will need tunneled catheter prior to DC and will d/w vascular scheduling of AVF. ptn wants all to be done while inptn. daughter wants to be HCP as per ptn's wishes. she was given paperwork to get it signed and witnessed and will present to nursing thereafter. details of findings and complaints and plan of care d/w daughter and wife. spent 60 min. RVP ordered. start mucinex , tessalon perles, duoneclemente, get CT chest to r/o PNA. pulm called    -  2/20:  ptn had RRT 2/2 AMS and tremors. no SZ, no LOC. noted to have Na 123( hyponatremia), given 500 cc NS. Gabapentin DCed. ptn had been taking gabapentin at home PTA. Pain is controlled. Pruritis resolved, tolerating HD otherwise.     - Pain management:  cont Oxycodone 10 IR q6H,  DIlaudid prn severe pain 2 mg q4H prn.   - cont outptn meds  - DVT ppx w [facility]SC

## 2025-03-08 NOTE — CHART NOTE - NSCHARTNOTEFT_GEN_A_CORE
Suprapubic catheter exchange  [facility] called for an exhange of the suprapubic catheter, per Urology PA, she reviewed the chart and reports that because patient recently had a dilation on 1/31/2025, the standard of care is to exhange after 6 weeks. States that if patient is still in the hospital next week, then urology should be called back to exchange the suprapubic catheter. If patient is discharged then he is to follow with his outpatient urologist for the exchange.    Will endorse to primary day team, attending to follow    Opal Markham NP  [facility] [id number]

## 2025-03-08 NOTE — PROGRESS NOTE ADULT - SUBJECTIVE AND OBJECTIVE BOX
[facility]  SABINO Griffin Y. Patel, S. Shah, G. Casimir  [phone]  ([phone] - weekdays after 5pm and weekends)    Name: BRIAN DEMPSEY  Age/Gender: 74y Male  MRN: [id number]    Interval History:  Patient seen and examined this morning.   Complains of foot pain, no other complaints.   Notes reviewed  No concerning overnight events  Afebrile   Allergies: No Known Allergies      Objective:  Vitals:   T(F): 97.7 (03-08-25 @ 07:27), Max: 98.2 (03-07-25 @ 16:25)  HR: 78 (03-08-25 @ 07:27) (78 - 82)  BP: 120/70 (03-08-25 @ 07:27) (120/70 - 151/84)  RR: 18 (03-08-25 @ 07:27) (18 - 18)  SpO2: 96% (03-08-25 @ 07:27) (94% - 97%)  Physical Examination:  General: no acute distress  HEENT: normocephalic, atraumatic, anicteric  Respiratory: no acc muscle use, breathing comfortably  Cardiovascular: S1 and S2 present  Gastrointestinal: normal appearing, nondistended  Extremities: no edema, LE with ischemic changes     Laboratory Studies:  CBC:                       9.0    14.13 )-----------( 216      ( 07 Mar 2025 08:57 )             29.7     WBC Trend:  14.13 03-07-25 @ 08:57  12.14 03-06-25 @ 09:51  13.86 03-05-25 @ 10:21  14.41 03-04-25 @ 06:56  11.77 03-03-25 @ 07:13  12.22 03-03-25 @ 01:12  16.50 03-02-25 @ 07:28    CMP: 03-07    135  |  96  |  20  ----------------------------<  100[H]  3.7   |  26  |  2.90[H]    Ca    7.0[L]      07 Mar 2025 08:57  Phos  1.7     03-07  Mg     1.9     03-07    Creatinine: 2.90 mg/dL (03-07-25 @ 08:57)  Creatinine: 3.28 mg/dL (03-05-25 @ 10:21)  Creatinine: 4.49 mg/dL (03-04-25 @ 06:55)  Creatinine: 4.49 mg/dL (03-04-25 @ 06:55)  Creatinine: 4.11 mg/dL (03-03-25 @ 01:12)    Microbiology: reviewed   Radiology: reviewed     Medications:  acetaminophen     Tablet .. 650 milliGRAM(s) Oral every 6 hours PRN  albuterol/ipratropium for Nebulization 3 milliLiter(s) Nebulizer every 6 hours  aspirin  chewable 81 milliGRAM(s) Oral daily  atorvastatin 40 milliGRAM(s) Oral at bedtime  bisacodyl 5 milliGRAM(s) Oral every 12 hours PRN  chlorhexidine 2% Cloths 1 Application(s) Topical daily  clopidogrel Tablet 75 milliGRAM(s) Oral daily  epoetin aleah-epbx (RETACRIT) Injectable 86025 Unit(s) IV Push <User Schedule>  guaiFENesin ER 1200 milliGRAM(s) Oral every 12 hours  HYDROmorphone  Injectable 2 milliGRAM(s) IV Push every 6 hours PRN  hydrOXYzine hydrochloride 25 milliGRAM(s) Oral three times a day  ibuprofen  Tablet. 400 milliGRAM(s) Oral every 12 hours  oxyCODONE    IR 10 milliGRAM(s) Oral every 6 hours PRN  oxyCODONE  ER Tablet 30 milliGRAM(s) Oral every 12 hours  pantoprazole    Tablet 40 milliGRAM(s) Oral before breakfast  polyethylene glycol 3350 17 Gram(s) Oral daily  senna 2 Tablet(s) Oral at bedtime  sodium chloride 0.9% lock flush 10 milliLiter(s) IV Push every 1 hour PRN    Prior/Completed Antimicrobials:  piperacillin/tazobactam IVPB.  piperacillin/tazobactam IVPB.-  vancomycin  IVPB

## 2025-03-08 NOTE — PROGRESS NOTE ADULT - SUBJECTIVE AND OBJECTIVE BOX
Subjective: Patient seen and examined. No new events except as noted.     REVIEW OF SYSTEMS:    CONSTITUTIONAL: + weakness, fevers or chills  EYES/ENT: No visual changes;  No vertigo or throat pain   NECK: No pain or stiffness  RESPIRATORY: No cough, wheezing, hemoptysis; No shortness of breath  CARDIOVASCULAR: No chest pain or palpitations  GASTROINTESTINAL: No abdominal or epigastric pain. No nausea, vomiting, or hematemesis; No diarrhea or constipation. No melena or hematochezia.  GENITOURINARY: No dysuria, frequency or hematuria  NEUROLOGICAL: No numbness or weakness  SKIN: No itching, burning, rashes, or lesions   All other review of systems is negative unless indicated above.    MEDICATIONS:  MEDICATIONS  (STANDING):  albuterol/ipratropium for Nebulization 3 milliLiter(s) Nebulizer every 6 hours  aspirin  chewable 81 milliGRAM(s) Oral daily  atorvastatin 40 milliGRAM(s) Oral at bedtime  chlorhexidine 2% Cloths 1 Application(s) Topical daily  clopidogrel Tablet 75 milliGRAM(s) Oral daily  epoetin aleah-epbx (RETACRIT) Injectable 73974 Unit(s) IV Push <User Schedule>  guaiFENesin ER 1200 milliGRAM(s) Oral every 12 hours  hydrOXYzine hydrochloride 25 milliGRAM(s) Oral three times a day  ibuprofen  Tablet. 400 milliGRAM(s) Oral every 12 hours  oxyCODONE  ER Tablet 30 milliGRAM(s) Oral every 12 hours  pantoprazole    Tablet 40 milliGRAM(s) Oral before breakfast  polyethylene glycol 3350 17 Gram(s) Oral daily  senna 2 Tablet(s) Oral at bedtime      PHYSICAL EXAM:  T(C): 36.4 (03-08-25 @ 15:41), Max: 36.7 (03-08-25 @ 00:55)  HR: 84 (03-08-25 @ 15:41) (78 - 84)  BP: 128/80 (03-08-25 @ 15:41) (120/70 - 151/84)  RR: 18 (03-08-25 @ 15:41) (17 - 18)  SpO2: 96% (03-08-25 @ 15:41) (94% - 97%)  Wt(kg): --  I&O's Summary    07 Mar 2025 07:01  -  08 Mar 2025 07:00  --------------------------------------------------------  IN: 420 mL / OUT: 1220 mL / NET: -800 mL    08 Mar 2025 06:01  -  08 Mar 2025 19:46  --------------------------------------------------------  IN: 0 mL / OUT: 500 mL / NET: -500 mL                Appearance: NAD  HEENT:  Dry oral mucosa, PERRL, EOMI	  Lymphatic: No lymphadenopathy  Cardiovascular: Normal S1 S2, No JVD, No murmurs, No edema  Respiratory: Lungs clear to auscultation	  Psychiatry: A & O x 3, Mood & affect appropriate  Skin: No rashes, No ecchymoses, No cyanosis	  Neurologic: Non-focal  GI/Abd: Soft, obese, nontender. Dressing to [facility] C/D/I. Suprapubic catheter in place  Ext: Palp femoral pulses bilat. BLE atrophic, minimal dorsi/plantarflexion bilaterally. Sensation grossly intact. LLE edematous compared to R, erythema to right toes/forefoot. mottling of right toes/forefoot/heel  LLE:  small superficial abrasion, +edema and +ecchymosis over L knee  +TTP over L knee, no TTP along remainder of extremity; compartments soft  Limited ROM at knee 2/2 pain  No gross varus/valgus laxity, but assessment limited 2/2 pain  Motor: TA/EHL/GS/FHL intact  Sensory: DP/SP/Tib/Jordan/Saph SILT  +DP pulse (symmetric relative to contralateral side), WWP   pressure wound from the LLE immobilizer posteriorly proximal to the knee.  Vascular: Peripheral pulses palpable 2+ bilaterally          LABS:    CARDIAC MARKERS:                                7.5    12.94 )-----------( 204      ( 08 Mar 2025 09:28 )             24.5     03-07    135  |  96  |  20  ----------------------------<  100[H]  3.7   |  26  |  2.90[H]    Ca    7.0[L]      07 Mar 2025 08:57  Phos  1.7     03-07  Mg     1.9     03-07        TELEMETRY: 	    ECG:  	  RADIOLOGY:   DIAGNOSTIC TESTING:  [ ] Echocardiogram:  [ ]  Catheterization:  [ ] Stress Test:    OTHER:

## 2025-03-08 NOTE — PROGRESS NOTE ADULT - ASSESSMENT
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 Last dialyzed Thursday, due today; Planning for outpatient HD MWF - we can switch to MWF next week    (2)Hyponatremia - improved as of late, with high-Na+ HD bath    (3)Hypophosphatemia - remains hypophosphatemic as of late, despite discontinuation of PO4 binder; s/p IV sodium phosphate on 3/3/25 and yesterday and now off dietary PO4 restriction from his diet.    (4)Anemia - s/p IV iron; on Retacrit with HD    (5)PAD - POD#4 s/p LE bypass - cyanotic changes of toes b/l    RECOMMEND:   (1)HD today - no net UF - high-Na+ bath; Retacrit with HD; revert to MWF HD next week    Beryl Darden DNP  [facility]  [phone]       Rafita Denny MD  [facility]  Office/on call physician: [phone]  Cell (7a-7p): [phone]

## 2025-03-08 NOTE — PROGRESS NOTE ADULT - ASSESSMENT
74 year old male with CKD, sp polio, paraplegia with associated neurogenic bladder s/p suprapubic catheter who was admitted s/p fall on 2/16.   CKD - ESRD, now s/p DEYA [place] 2/17, on HD  ruled out cellulitis around suprapubic cath  2/20 s/p RRT for tremors and confusion -- pt with chronic tremors although notably worse  hyponatremia    mrsa negative   2/20 CXR with clear lungs   SPC site with chronic/stable inflammatory changes, no drainage - no sign of SSTI  CTA abd with occlusion of b/l external iliac arteries and b/l superficial femoral arteries with distal reconstitution at popliteal arteries; patent three vessel runoff of RLE with 2 vessel runoff of LLE with occlusion of L mid anterior tibial artery with distal reconstitution  Bcx negative to date   mental status at baseline  WBC noted post op-stable overall, remains afebrile   Vascular following for worsening PAD with worsening ischemic changes   -3/3 s/p RLE angiogram b/l iliac artery stents     s/p vancomycin x1 2/20  s/p zosyn 2/20-2/22      Recommendations:   Continue off antibiotics   Vascular surgery following  HD per renal    Monitor temps/WBC  Continue rest of care per primary team   Stable from ID standpoint at this time.     Bridgette Ramirez M.D.  [facility]  Available on [employer] TEAMS - *PREFERRED*  [phone]  After 5pm on weekdays and all day on weekends - please call [phone]     Thank you for consulting us and involving us in the management of this patients case. In addition to reviewing history, imaging, documents, labs, microbiology, took into account antibiotic stewardship, local antibiogram and infection control strategies and potential transmission issues at time of treatment decision making process.

## 2025-03-08 NOTE — PROGRESS NOTE ADULT - SUBJECTIVE AND OBJECTIVE BOX
NEPHROLOGY     Patient seen and examined with wife at bedside, comfortable on room air, no new complaints,     MEDICATIONS  (STANDING):  albuterol/ipratropium for Nebulization 3 milliLiter(s) Nebulizer every 6 hours  aspirin  chewable 81 milliGRAM(s) Oral daily  atorvastatin 40 milliGRAM(s) Oral at bedtime  chlorhexidine 2% Cloths 1 Application(s) Topical daily  clopidogrel Tablet 75 milliGRAM(s) Oral daily  epoetin alaeh-epbx (RETACRIT) Injectable 05810 Unit(s) IV Push <User Schedule>  guaiFENesin ER 1200 milliGRAM(s) Oral every 12 hours  hydrOXYzine hydrochloride 25 milliGRAM(s) Oral three times a day  ibuprofen  Tablet. 400 milliGRAM(s) Oral every 12 hours  oxyCODONE  ER Tablet 30 milliGRAM(s) Oral every 12 hours  pantoprazole    Tablet 40 milliGRAM(s) Oral before breakfast  polyethylene glycol 3350 17 Gram(s) Oral daily  senna 2 Tablet(s) Oral at bedtime    VITALS:  T(C): , Max: 36.8 (03-07-25 @ 16:25)  T(F): , Max: 98.2 (03-07-25 @ 16:25)  HR: 78 (03-08-25 @ 07:27)  BP: 120/70 (03-08-25 @ 07:27)  RR: 18 (03-08-25 @ 07:27)  SpO2: 96% (03-08-25 @ 07:27)    I and O's:    03-07 @ 07:01  -  03-08 @ 07:00  --------------------------------------------------------  IN: 420 mL / OUT: 1220 mL / NET: -800 mL    PHYSICAL EXAM:  Constitutional: alert, NAD  HEENT: NCAT, DMM  Neck: Supple, No JVD  Respiratory: CTA-b/l  Cardiovascular: RRR s1s2, no m/r/g  Gastrointestinal: BS+, soft, NT/ND  Extremities: No peripheral edema b/l  Neurological: reduced generalized strength  Back: no CVAT b/l  Skin: (+)cyanotic changes b/l feet  Access: RIJ tunneled cath    LABS:                        7.5    12.94 )-----------( 204      ( 08 Mar 2025 09:28 )             24.5     03-07    135  |  96  |  20  ----------------------------<  100[H]  3.7   |  26  |  2.90[H]    Ca    7.0[L]      07 Mar 2025 08:57  Phos  1.7     03-07  Mg     1.9     03-07

## 2025-03-08 NOTE — PROGRESS NOTE ADULT - PROBLEM SELECTOR PLAN 2
Nondisplaced L medial femoral condyle fx   s/p RLE angiogram w/ b/l iliac artery stents 3/3  ortho following  Wound from Left knee immobilizer is dressed and healing.  New ecchymoses in b/l feet  As per vascular: no further revascularization options.  podiatry consulted:  No acute podiatric surgical intervention at this time.

## 2025-03-08 NOTE — PROGRESS NOTE ADULT - SUBJECTIVE AND OBJECTIVE BOX
Patient is a 74y old  Male who presents with a chief complaint of ESRD requiring HD, uremia (08 Mar 2025 10:29)      SUBJECTIVE / OVERNIGHT EVENTS: ptn didnt want to get PT eval done, will reorder. ptn needs NYDIA placement. PMNR consult ordered    MEDICATIONS  (STANDING):  albuterol/ipratropium for Nebulization 3 milliLiter(s) Nebulizer every 6 hours  aspirin  chewable 81 milliGRAM(s) Oral daily  atorvastatin 40 milliGRAM(s) Oral at bedtime  chlorhexidine 2% Cloths 1 Application(s) Topical daily  clopidogrel Tablet 75 milliGRAM(s) Oral daily  epoetin aleah-epbx (RETACRIT) Injectable 19540 Unit(s) IV Push <User Schedule>  guaiFENesin ER 1200 milliGRAM(s) Oral every 12 hours  hydrOXYzine hydrochloride 25 milliGRAM(s) Oral three times a day  ibuprofen  Tablet. 400 milliGRAM(s) Oral every 12 hours  oxyCODONE  ER Tablet 30 milliGRAM(s) Oral every 12 hours  pantoprazole    Tablet 40 milliGRAM(s) Oral before breakfast  polyethylene glycol 3350 17 Gram(s) Oral daily  senna 2 Tablet(s) Oral at bedtime    MEDICATIONS  (PRN):  acetaminophen     Tablet .. 650 milliGRAM(s) Oral every 6 hours PRN Temp greater or equal to 38C (100.4F), Mild Pain (1 - 3)  bisacodyl 5 milliGRAM(s) Oral every 12 hours PRN Constipation  HYDROmorphone  Injectable 2 milliGRAM(s) IV Push every 6 hours PRN Severe Pain (7 - 10)  oxyCODONE    IR 10 milliGRAM(s) Oral every 6 hours PRN Moderate Pain (4 - 6)  sodium chloride 0.9% lock flush 10 milliLiter(s) IV Push every 1 hour PRN Pre/post blood products, medications, blood draw, and to maintain line patency      Vital Signs Last 24 Hrs  T(F): 98.1 (03-08-25 @ 12:20), Max: 98.2 (03-07-25 @ 16:25)  HR: 78 (03-08-25 @ 12:20) (78 - 82)  BP: 125/67 (03-08-25 @ 12:20) (120/70 - 151/84)  RR: 17 (03-08-25 @ 12:20) (17 - 18)  SpO2: 94% (03-08-25 @ 12:20) (94% - 97%)  Telemetry:   CAPILLARY BLOOD GLUCOSE        I&O's Summary    07 Mar 2025 07:01  -  08 Mar 2025 07:00  --------------------------------------------------------  IN: 420 mL / OUT: 1220 mL / NET: -800 mL    08 Mar 2025 06:01  -  08 Mar 2025 15:34  --------------------------------------------------------  IN: 0 mL / OUT: 500 mL / NET: -500 mL        PHYSICAL EXAM:  GENERAL: NAD, well-developed  HEAD:  Atraumatic, Normocephalic  EYES: EOMI, PERRLA, conjunctiva and sclera clear  NECK: Supple, No JVD  CHEST/LUNG: Clear to auscultation bilaterally; No wheeze  HEART: Regular rate and rhythm; No murmurs, rubs, or gallops  ABDOMEN: Soft, Nontender, Nondistended; Bowel sounds present  EXTREMITIES:  2+ Peripheral Pulses, No clubbing, cyanosis, or edema  PSYCH: AAOx3  NEUROLOGY: non-focal  SKIN: No rashes or lesions    LABS:                        7.5    12.94 )-----------( 204      ( 08 Mar 2025 09:28 )             24.5     03-07    135  |  96  |  20  ----------------------------<  100[H]  3.7   |  26  |  2.90[H]    Ca    7.0[L]      07 Mar 2025 08:57  Phos  1.7     03-07  Mg     1.9     03-07            Urinalysis Basic - ( 07 Mar 2025 08:57 )    Color: x / Appearance: x / SG: x / pH: x  Gluc: 100 mg/dL / Ketone: x  / Bili: x / Urobili: x   Blood: x / Protein: x / Nitrite: x   Leuk Esterase: x / RBC: x / WBC x   Sq Epi: x / Non Sq Epi: x / Bacteria: x        RADIOLOGY & ADDITIONAL TESTS:    Imaging Personally Reviewed:    Consultant(s) Notes Reviewed:      Care Discussed with Consultants/Other Providers:

## 2025-03-09 NOTE — PROGRESS NOTE ADULT - ASSESSMENT
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  came in after fall and for HD.     2/20 CTH neg   \Na 123 --> now 133   A1c 5.7   TSH WNL 3.46   o/e 2/21 AAOx2, uppers 4/5, lowers limited .  2/23; family bedside upset that he has pain in leg after he was moved.  patient going for imaging now.   s/p R IJ permacath by IR 2/25 2/27 AAOx3     Imrpssion  1) AMS, waxing and waing , likely multifactorial from infection, metabolic/electrolyte derrangements/ delerium / medication effect , ureemia which is imporving   2) polio  3) fall 2/2 weakness  4) hyponatremia to 123 2/20  improved 133 -->136    - f/u  regarding suprapubic catheter exchange   - f/u podiatry./ f/u vascular   - monitor puleses in distal LE; f/u vascular    - eventually AVF  - f/u vascular; no vascluar options   - on DAPT   - was getting oxycodone ER 30mg BID and oxy PRN IR i10 and dilauded PRN ;   - f/u psych   - limit sedating meds   - continue to monitor and correct metabolic derrangements .  - delerium precuations.   - frequent re orientation  - check b12, RPR, ammonia level   - will consdier MRI brain or EEG if doesn't improve but seems to have been improving   - PT/OT   - check FS, glucose control <180  - GI/DVT ppx  - Thank you for allowing me to participate in the care of this patient. Call with questions.    spoke with wife 3/6   Paul Limon MD  [facility] Neurology  Office: [phone]

## 2025-03-09 NOTE — PROGRESS NOTE ADULT - SUBJECTIVE AND OBJECTIVE BOX
[facility]  SABINO Griffin Y. Patel, S. Shah, G. Casimir  [phone]  ([phone] - weekdays after 5pm and weekends)    Name: BRIAN DEMPSEY  Age/Gender: 74y Male  MRN: [id number]    Interval History:  Patient seen and examined this morning.   No new complaints noted.  Notes reviewed  No concerning overnight events  Afebrile   Allergies: No Known Allergies      Objective:  Vitals:   T(F): 98.2 (03-08-25 @ 23:50), Max: 98.2 (03-08-25 @ 23:50)  HR: 82 (03-08-25 @ 23:50) (78 - 84)  BP: 118/65 (03-08-25 @ 23:50) (118/65 - 128/80)  RR: 18 (03-08-25 @ 23:50) (17 - 18)  SpO2: 100% (03-08-25 @ 23:50) (94% - 100%)  Physical Examination:  General: no acute distress  HEENT: normocephalic, atraumatic, anicteric  Respiratory: no acc muscle use, breathing comfortably  Cardiovascular: S1 and S2 present  Gastrointestinal: normal appearing, nondistended  Extremities: no edema, LE with ischemic changes       Laboratory Studies:  CBC:                       7.5    12.94 )-----------( 204      ( 08 Mar 2025 09:28 )             24.5     WBC Trend:  12.94 03-08-25 @ 09:28  14.13 03-07-25 @ 08:57  12.14 03-06-25 @ 09:51  13.86 03-05-25 @ 10:21  14.41 03-04-25 @ 06:56  11.77 03-03-25 @ 07:13  12.22 03-03-25 @ 01:12  16.50 03-02-25 @ 07:28    CMP: 03-07    135  |  96  |  20  ----------------------------<  100[H]  3.7   |  26  |  2.90[H]    Ca    7.0[L]      07 Mar 2025 08:57  Phos  1.7     03-07  Mg     1.9     03-07    Creatinine: 2.90 mg/dL (03-07-25 @ 08:57)  Creatinine: 3.28 mg/dL (03-05-25 @ 10:21)  Creatinine: 4.49 mg/dL (03-04-25 @ 06:55)  Creatinine: 4.49 mg/dL (03-04-25 @ 06:55)  Creatinine: 4.11 mg/dL (03-03-25 @ 01:12)    Microbiology: reviewed   Radiology: reviewed     Medications:  acetaminophen     Tablet .. 650 milliGRAM(s) Oral every 6 hours PRN  albuterol/ipratropium for Nebulization 3 milliLiter(s) Nebulizer every 6 hours  aspirin  chewable 81 milliGRAM(s) Oral daily  atorvastatin 40 milliGRAM(s) Oral at bedtime  bisacodyl 5 milliGRAM(s) Oral every 12 hours PRN  chlorhexidine 2% Cloths 1 Application(s) Topical daily  clopidogrel Tablet 75 milliGRAM(s) Oral daily  epoetin aleah-epbx (RETACRIT) Injectable 12295 Unit(s) IV Push <User Schedule>  guaiFENesin ER 1200 milliGRAM(s) Oral every 12 hours  HYDROmorphone  Injectable 2 milliGRAM(s) IV Push every 6 hours PRN  hydrOXYzine hydrochloride 25 milliGRAM(s) Oral three times a day  ibuprofen  Tablet. 400 milliGRAM(s) Oral every 12 hours  oxyCODONE    IR 10 milliGRAM(s) Oral every 6 hours PRN  oxyCODONE  ER Tablet 30 milliGRAM(s) Oral every 12 hours  pantoprazole    Tablet 40 milliGRAM(s) Oral before breakfast  polyethylene glycol 3350 17 Gram(s) Oral daily  senna 2 Tablet(s) Oral at bedtime  sodium chloride 0.9% lock flush 10 milliLiter(s) IV Push every 1 hour PRN    Prior/Completed Antimicrobials:  piperacillin/tazobactam IVPB.  piperacillin/tazobactam IVPB.-  vancomycin  IVPB

## 2025-03-09 NOTE — PROGRESS NOTE ADULT - SUBJECTIVE AND OBJECTIVE BOX
Neurology      S; patient seen. no neuro changes         Medications: MEDICATIONS  (STANDING):  albuterol/ipratropium for Nebulization 3 milliLiter(s) Nebulizer every 6 hours  aspirin  chewable 81 milliGRAM(s) Oral daily  atorvastatin 40 milliGRAM(s) Oral at bedtime  chlorhexidine 2% Cloths 1 Application(s) Topical daily  clopidogrel Tablet 75 milliGRAM(s) Oral daily  epoetin aleah-epbx (RETACRIT) Injectable 36006 Unit(s) IV Push <User Schedule>  guaiFENesin ER 1200 milliGRAM(s) Oral every 12 hours  hydrOXYzine hydrochloride 25 milliGRAM(s) Oral three times a day  ibuprofen  Tablet. 400 milliGRAM(s) Oral every 12 hours  oxyCODONE  ER Tablet 30 milliGRAM(s) Oral every 12 hours  pantoprazole    Tablet 40 milliGRAM(s) Oral before breakfast  polyethylene glycol 3350 17 Gram(s) Oral daily  senna 2 Tablet(s) Oral at bedtime    MEDICATIONS  (PRN):  acetaminophen     Tablet .. 650 milliGRAM(s) Oral every 6 hours PRN Temp greater or equal to 38C (100.4F), Mild Pain (1 - 3)  bisacodyl 5 milliGRAM(s) Oral every 12 hours PRN Constipation  HYDROmorphone  Injectable 2 milliGRAM(s) IV Push every 6 hours PRN Severe Pain (7 - 10)  oxyCODONE    IR 10 milliGRAM(s) Oral every 6 hours PRN Moderate Pain (4 - 6)  sodium chloride 0.9% lock flush 10 milliLiter(s) IV Push every 1 hour PRN Pre/post blood products, medications, blood draw, and to maintain line patency       Vitals:  Vital Signs Last 24 Hrs  T(C): 36.7 (09 Mar 2025 07:19), Max: 36.8 (08 Mar 2025 23:50)  T(F): 98.1 (09 Mar 2025 07:19), Max: 98.2 (08 Mar 2025 23:50)  HR: 78 (09 Mar 2025 07:19) (78 - 84)  BP: 128/72 (09 Mar 2025 07:19) (118/65 - 128/80)  BP(mean): --  RR: 18 (09 Mar 2025 07:19) (18 - 18)  SpO2: 98% (09 Mar 2025 07:19) (96% - 100%)    Parameters below as of 09 Mar 2025 07:19  Patient On (Oxygen Delivery Method): room air                  General Appearance: Appropriately dressed and in no acute distress       Head: Normocephalic, atraumatic and no dysmorphic features  Ear, Nose, and Throat: Moist mucous membranes  CVS: S1S2+  Resp: No SOB, no wheeze or rhonchi  GI: soft NT/ND  Extremities: LE PVD : dusky toes noted   Skin: + decub      Neurological Exam:  Mental Status: Awake, alert and oriented x 2.  Able to follow simple and complex verbal commands. Able to name and repeat. fluent speech. No obvious aphasia or dysarthria noted.   Cranial Nerves: PERRL, EOMI, VFFC, sensation V1-V3 intact,  no obvious facial asymmetry, equal elevation of palate, scm/trap 5/5, tongue is midline on protrusion. no obvious papilledema on fundoscopic exam. hearing is grossly intact.   Motor: Normal bulk, tone and strength throughout uppers 4/ lowers 0-/1 5   Sensation: Intact to light touch and pinprick throughout.     Coordination: No dysmetria on FNF   Gait: deferred, wheelchair bound     Data/Labs/Imaging which I personally reviewed.           LABS:                          9.8    11.58 )-----------( 186      ( 09 Mar 2025 07:09 )             33.3     03-09    135  |  98  |  14  ----------------------------<  86  4.3   |  22  |  2.31[H]    Ca    7.1[L]      09 Mar 2025 07:09          Urinalysis Basic - ( 09 Mar 2025 07:09 )    Color: x / Appearance: x / SG: x / pH: x  Gluc: 86 mg/dL / Ketone: x  / Bili: x / Urobili: x   Blood: x / Protein: x / Nitrite: x   Leuk Esterase: x / RBC: x / WBC x   Sq Epi: x / Non Sq Epi: x / Bacteria: x            < from: CT Head No Cont (02.20.25 @ 18:47) >    ACC: [id number] EXAM:  CT BRAIN   ORDERED BY: GUY DE LA CRUZ     PROCEDURE DATE:  02/20/2025          INTERPRETATION:  CLINICAL INDICATION: Altered mental status    TECHNIQUE: Axial CT scanning of the brain was obtained from the skull   base to the vertex without the administration of intravenous contrast.   Reformatted coronal and sagittal images were subsequently obtained and   reviewed.    COMPARISON: None    FINDINGS:  There is no CT evidence of acute transcortical infarct. Age-related   involutional changes and chronic microvascular ischemic changes.    There is no hydrocephalus, mass effect, or acute intracranial hemorrhage.   No extra-axial collection. Basal cisterns are patent.    The visualized paranasal sinuses and mastoid air cells are clear.    The calvarium is intact.    IMPRESSION:  No evidence of acute transcortical infarct, acute intracranial   hemorrhage, or mass effect.    --- End of Report ---            CORTNEY REARDON MD; Attending Radiologist  This document has been electronically signed. Feb 20 2025  7:07PM    < end of copied text >

## 2025-03-09 NOTE — PROGRESS NOTE ADULT - PROBLEM SELECTOR PLAN 3
-Neuro following  -ABG 2/20 acceptable  -Pt lethargic 2/22, ABG never sent but AMS appears to be improving   -ABG 2/28 with no co2 retention.    3/9: he seems pretty alert and awake and responsive appropriately to questions

## 2025-03-09 NOTE — PHYSICAL THERAPY INITIAL EVALUATION ADULT - PERTINENT HX OF CURRENT PROBLEM, REHAB EVAL
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 He is well known to me from multiple recent admisions  s/p admissions at [facility] 12/20-12/25/24 and 2/4-2/7 with SONDRA on CKD with associated uremic symptoms.   At the last admission he had expressed strong interest to try to hold off with HD intiation but he has been getting worse clinically at home.  His SONDRA and uremic symptoms significantly improved after the first admission; they improved a to mild extent during the 2nd admission to the point where he was able to be discharged without HD. Since then, however, he has worsened further.   He has been suffering from progressively worsening diffuse pruritus, loss of appetite, and generalized weakness and falls.   His nephrologist and PCP has been speaking with him and his family over the past few days,   The initial plan was that he would present to the [facility] ER Monday 2/17 (ie tomorrow) for HD initiation. Today, however, he fell and sustained trauma to his knee. Given the fall and concern for knee fracture, he presented to the ER today. multiple xrays show no Fractures.   CT showing Nondispalced L medial femoral condyle fx
74M with history of HTN, polio with multiple subsequent problems including LE weakness requiring wheelchair, neurogenic bladder s/p suprapubic catheter, and colostomy s/p iatrogenic rectal injury 2/2023, and CKD 5 presenting to the ED after falling from his wheelchair. Admitted for initiation of HD.  AMS, waxing and waing , likely multifactorial from infection, metabolic/electrolyte derrangements/ delerium / medication effect , ureemia which is imporving. Knee xray + fem condyl fx, +PAD and S/p b/l iliac stent placement on 3/3/25.

## 2025-03-09 NOTE — PROGRESS NOTE ADULT - ASSESSMENT
74 year old male with CKD, sp polio, paraplegia with associated neurogenic bladder s/p suprapubic catheter who was admitted s/p fall on 2/16.   CKD - ESRD, now s/p DEYA boston 2/17, on HD  ruled out cellulitis around suprapubic cath  2/20 s/p RRT for tremors and confusion -- pt with chronic tremors although notably worse  hyponatremia    mrsa negative   2/20 CXR with clear lungs   SPC site with chronic/stable inflammatory changes, no drainage - no sign of SSTI  CTA abd with occlusion of b/l external iliac arteries and b/l superficial femoral arteries with distal reconstitution at popliteal arteries; patent three vessel runoff of RLE with 2 vessel runoff of LLE with occlusion of L mid anterior tibial artery with distal reconstitution  Bcx negative to date   mental status at baseline  WBC noted post op-stable overall, remains afebrile   Vascular following for worsening PAD with worsening ischemic changes   -3/3 s/p RLE angiogram b/l iliac artery stents     s/p vancomycin x1 2/20  s/p zosyn 2/20-2/22      Recommendations:   Continue off antibiotics   Vascular surgery following  HD per renal    Monitor temps/WBC  Continue rest of care per primary team   Stable from ID standpoint at this time.     Bridgette Ramirez M.D.  [facility]  Available on [employer] TEAMS - *PREFERRED*  [phone]  After 5pm on weekdays and all day on weekends - please call [phone]     Thank you for consulting us and involving us in the management of this patients case. In addition to reviewing history, imaging, documents, labs, microbiology, took into account antibiotic stewardship, local antibiogram and infection control strategies and potential transmission issues at time of treatment decision making process.

## 2025-03-09 NOTE — PROGRESS NOTE ADULT - PROBLEM SELECTOR PLAN 1
-CXR with elevated R hemidiaphragm (not present on CXR in December 2024)  -Low grade temp, cough  -CT chest with LLL, RLL GGO, mucoid impacted airways. No clear PNA. Monitoring off ABX per ID  -Suggest Duoneb q6h, Mucinex 1200 mg PO BID   -S/p Mucomyst x5 days   -Incentive spirometry   -Keep sats >90% with o2 PRN  -ABG 2/28 with no co2 retention  -CXR 3/1 with improved R hemidiaphragm, low lung volumes on L. Otherwise grossly clear.  3/9: seems pretty good toay  :   no sob:  on room air:  pain is controlled:  encouraged IS

## 2025-03-09 NOTE — PROGRESS NOTE ADULT - ASSESSMENT
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 The initial plan was that he would present to the [facility] ER Monday 2/17 for HD initiation. However, day of admission, he fell and sustained trauma to his knee. Called to consult for cough, elevated R hemidiaphragm.

## 2025-03-09 NOTE — PROGRESS NOTE ADULT - ASSESSMENT
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 He is well known to me from multiple recent admisions  s/p admissions at [facility] 12/20-12/25/24 and 2/4-2/7 with SONDRA on CKD with associated uremic symptoms.   At the last admission he had expressed strong interest to try to hold off with HD intiation but he has been getting worse clinically at home.  His SONDRA and uremic symptoms significantly improved after the first admission; they improved a to mild extent during the 2nd admission to the point where he was able to be discharged without HD. Since then, however, he has worsened further.   He has been suffering from progressively worsening diffuse pruritus, loss of appetite, and generalized weakness and falls.   His nephrologist and PCP has been speaking with him and his family over the past few days,   The initial plan was that he would present to the [facility] ER Monday 2/17 (ie tomorrow) for HD initiation. Today, however, he fell and sustained trauma to his knee. Given the fall and concern for knee fracture, he presented to the ER today. multiple xrays show no Fractures.      CKD5, uremia, requiring initiation of HD  Rash, seborrheic dermatitis  Pruritis  Anemia  PAD  SP catheter, chronic  Left inferior pole acute on chronic patella Fx    plan  - HD via R subclavian permacath  -3/9: seen by PT, will refer to [facility]. choices to be given in AM. this was d/w ptn, daughter, wife.   -3/8:  ptn didnt want to get PT eval done, will reorder. ptn needs [facility] placement. PMNR consult ordered  - 3/7: ptn is alert , pain is controlled, DC planning d/w ptn and HCP, daughter Yanique  -3/6:  ptn is awake, alert, ecchymotic feet look improved, pain is controlled. DC planning to [facility]  3/4-5 - s/p b/l iliac arteries angioplasty and stent placements on 3/3. today has new ecchymoses in b/l LE, concern for arterial insufficiency. vascular aware.. wound from Left knee immobilizer is dressed and healing. pain is controlled.   LEFT UE AVF placement/scheduling will be on outptn basis as per vascular. dc planning to [facility]  - 3/3: ptn is s/p angiogram RLE : b/l iliac arteries w successful angioplasty and stents. in PACU. awaitng HD.  ptn has a pressure wound from the LLE immobilizer posteriorly proximal to the knee. its been on 2/2 knee fracture, applied by ortho and managed by ptn's wife as per ptn's and wife's request , this was d/w nursing and nurse manager ms Ruiz.. immobilizer should be managed by orthopedics and nursing. will keep it off, only keep on when repositioning for care and then remove. wound care to F/U . this was discussed at length w daughter Yanique and wife Dee Dee.   -3/1-3/2: ptn is smiling, pain free, had HD 2/28 and 3/1, awaiting RLE angiogram 3/3, on Heparin drip, euvolemic  -2/28: ptn is lethargic, awaiting RLE angiogram possible fem-fem bypass hopefully on 3/3 pending OR availability, awaiting HD today    -2/27:  plan for angiogram in am with possible angioplasty, possible stent, possible fem-fem bypass. medically cleared for angiogram and possible surgery, stent, angioplasty. pain is controlled. remains on heparin drip as per vascular. HD as per renal    -2/26:  ptn is sleeping, pain is controlled, he was seen by wound care and vascular attending. planning on angiogram 2/2 severe PAD in LE on CTA. he also spoke to HCP. ptn and HCP in agreement. ptn was started on HEPARIN drip as per vascular recs. RIJ permacath done 2/25    - 2/25: ptn states he is hallucinating, but not at present, his MS is at baseline, his pain is controlled, he still needs prn pain meds when he is moved in the bed or for testing or for HD. awaiting Permacath, AVF creation, LE bypass to be d/w Dr. Swenson and the ptn tomorrow. ptn has cardiology clearance  if he opts for. Ptn has sacral decubiti and posterior thigh and buttock decibiti and b/l heel decubiti. Z flow fabienne d/w RN, get wound care consult    -2/24: pain is controlled  if the LLE is immobile and fully extended. doesn't tolerate the knee immobilizer. has a medium condyle and acute on chronic patella fx in LEFT knee. as per vascular ptn will need iliac stent  w a fem-fem bypass, possible axillo-femoral bypass. for this surgery he would need cardiac work up . more pressing surgery is LUE AVF creation,  in IR will arrange for Permacath. for pain control: Motrin 400 mg q12H, Oxy ER 30 mg q12H, Oxy IR 10 for mod pain and dilaudid 2 mg iv for severe pain. still has pruritis though improved, will raise atarax 25 mg to tid. plan of care d/w daughter Norma Persaud , ptn and his wife. Also d/w renal, card, vascular, ACP    -2/23: Daughter Yanique is the HCP, she and the ptn filled out the paperwork. daily plan and findings d/w her and the ptn. MS is at abseline, c/o b/l LE foot pain and Left Knee pain. xrays of left knee: cannot r/o acute on chronic inferior pole patellar Fx. knee immobilizer is on, awaiting ortho consult. doubt needs any intervention awaiting Ct chest today, will add CT Left knee. ptn states itching has recurred, severe pain has recurred. will resume Atatrax ( 25 mg bid) and Oxycodone ER , but at a lower dose 15 mg q12H. cont prn analgesics. HD as per renal, awaiting Vascular consult f/u re CTA A/L &LE and recs. ptn also wants AVF surgery on this admission. Coughing has stopped    - 2/22: ptn is drowsy but arousable, calmer today, answers questions appropriately. he is pain free, he stopped coughing, he denies having pruritis: will DC:  OXY ER, Atarax, Tessalon perles.     -  2/21: ptn is tearful, a bit confused, coughing, recognizes me and family at bedside. GOC d/w daughter and wife. AMS prob delirium 2/2 acute illness +/- opiods, lyrica, atarac. will lower atarak to tid, dc lyrica, cont Oxy 2/2 ptn has severe LE pain. neuro called for eval. Head ct done yesterday w no acute findings. CTA A/P w LE run fof: severe PAD, vascular to follow up on plan fo care. plan for HD tomorrow and next week place on tiw schedule of MWF. will need tunneled catheter prior to DC and will d/w vascular scheduling of AVF. ptn wants all to be done while inptn. daughter wants to be HCP as per ptn's wishes. she was given paperwork to get it signed and witnessed and will present to nursing thereafter. details of findings and complaints and plan of care d/w daughter and wife. spent 60 min. RVP ordered. start mucinex , tessalon perles, duonebs, get CT chest to r/o PNA. pulm called    -  2/20:  ptn had RRT 2/2 AMS and tremors. no SZ, no LOC. noted to have Na 123( hyponatremia), given 500 cc NS. Gabapentin DCed. ptn had been taking gabapentin at home PTA. Pain is controlled. Pruritis resolved, tolerating HD otherwise.     - Pain management:  cont Oxycodone 10 IR q6H,  DIlaudid prn severe pain 2 mg q4H prn.   - cont outptn meds  - DVT ppx w [facility]

## 2025-03-09 NOTE — PROGRESS NOTE ADULT - PROBLEM SELECTOR PLAN 4
-Per vascular  -S/p b/l iliac stent placement on 3/3/25  -Podiatry f/u.  3/9: has cyanosed digits:  defer to vascular

## 2025-03-09 NOTE — PROGRESS NOTE ADULT - SUBJECTIVE AND OBJECTIVE BOX
Patient is a 74y old  Male who presents with a chief complaint of ESRD requiring HD, uremia (09 Mar 2025 14:59)      SUBJECTIVE / OVERNIGHT EVENTS: ptn is awake, alert, pain controlled, seen by PT, referred to [facility].     MEDICATIONS  (STANDING):  albuterol/ipratropium for Nebulization 3 milliLiter(s) Nebulizer every 6 hours  aspirin  chewable 81 milliGRAM(s) Oral daily  atorvastatin 40 milliGRAM(s) Oral at bedtime  chlorhexidine 2% Cloths 1 Application(s) Topical daily  clopidogrel Tablet 75 milliGRAM(s) Oral daily  epoetin aleah-epbx (RETACRIT) Injectable 43599 Unit(s) IV Push <User Schedule>  guaiFENesin ER 1200 milliGRAM(s) Oral every 12 hours  hydrOXYzine hydrochloride 25 milliGRAM(s) Oral three times a day  ibuprofen  Tablet. 400 milliGRAM(s) Oral every 12 hours  oxyCODONE  ER Tablet 30 milliGRAM(s) Oral every 12 hours  pantoprazole    Tablet 40 milliGRAM(s) Oral before breakfast  polyethylene glycol 3350 17 Gram(s) Oral daily  senna 2 Tablet(s) Oral at bedtime    MEDICATIONS  (PRN):  acetaminophen     Tablet .. 650 milliGRAM(s) Oral every 6 hours PRN Temp greater or equal to 38C (100.4F), Mild Pain (1 - 3)  bisacodyl 5 milliGRAM(s) Oral every 12 hours PRN Constipation  HYDROmorphone  Injectable 2 milliGRAM(s) IV Push every 6 hours PRN Severe Pain (7 - 10)  oxyCODONE    IR 10 milliGRAM(s) Oral every 6 hours PRN Moderate Pain (4 - 6)  sodium chloride 0.9% lock flush 10 milliLiter(s) IV Push every 1 hour PRN Pre/post blood products, medications, blood draw, and to maintain line patency      Vital Signs Last 24 Hrs  T(F): 98.3 (03-09-25 @ 16:25), Max: 98.3 (03-09-25 @ 16:25)  HR: 85 (03-09-25 @ 16:25) (78 - 85)  BP: 99/64 (03-09-25 @ 16:25) (99/64 - 128/72)  RR: 18 (03-09-25 @ 16:25) (18 - 18)  SpO2: 98% (03-09-25 @ 16:25) (98% - 100%)  Telemetry:   CAPILLARY BLOOD GLUCOSE        I&O's Summary    08 Mar 2025 06:01  -  09 Mar 2025 07:00  --------------------------------------------------------  IN: 0 mL / OUT: 500 mL / NET: -500 mL    09 Mar 2025 07:01  -  09 Mar 2025 20:36  --------------------------------------------------------  IN: 0 mL / OUT: 500 mL / NET: -500 mL        PHYSICAL EXAM:  GENERAL: NAD, well-developed  HEAD:  Atraumatic, Normocephalic  EYES: EOMI, PERRLA, conjunctiva and sclera clear  NECK: Supple, No JVD  CHEST/LUNG: Clear to auscultation bilaterally; No wheeze  HEART: Regular rate and rhythm; No murmurs, rubs, or gallops  ABDOMEN: Soft, Nontender, Nondistended; Bowel sounds present  EXTREMITIES:  2+ Peripheral Pulses, No clubbing, cyanosis, or edema  PSYCH: AAOx3  NEUROLOGY: non-focal  SKIN: No rashes or lesions    LABS:                        9.8    11.58 )-----------( 186      ( 09 Mar 2025 07:09 )             33.3     03-09    135  |  98  |  14  ----------------------------<  86  4.3   |  22  |  2.31[H]    Ca    7.1[L]      09 Mar 2025 07:09            Urinalysis Basic - ( 09 Mar 2025 07:09 )    Color: x / Appearance: x / SG: x / pH: x  Gluc: 86 mg/dL / Ketone: x  / Bili: x / Urobili: x   Blood: x / Protein: x / Nitrite: x   Leuk Esterase: x / RBC: x / WBC x   Sq Epi: x / Non Sq Epi: x / Bacteria: x        RADIOLOGY & ADDITIONAL TESTS:    Imaging Personally Reviewed:    Consultant(s) Notes Reviewed:      Care Discussed with Consultants/Other Providers:

## 2025-03-09 NOTE — PROGRESS NOTE ADULT - SUBJECTIVE AND OBJECTIVE BOX
Date of Service: 03-09-25 @ 14:59    Patient is a 74y old  Male who presents with a chief complaint of ESRD requiring HD, uremia (09 Mar 2025 10:03)      Any change in ROS: He seems OK:  pretty alert and awake and is on room air:   on bed      MEDICATIONS  (STANDING):  albuterol/ipratropium for Nebulization 3 milliLiter(s) Nebulizer every 6 hours  aspirin  chewable 81 milliGRAM(s) Oral daily  atorvastatin 40 milliGRAM(s) Oral at bedtime  chlorhexidine 2% Cloths 1 Application(s) Topical daily  clopidogrel Tablet 75 milliGRAM(s) Oral daily  epoetin aleah-epbx (RETACRIT) Injectable 78747 Unit(s) IV Push <User Schedule>  guaiFENesin ER 1200 milliGRAM(s) Oral every 12 hours  hydrOXYzine hydrochloride 25 milliGRAM(s) Oral three times a day  ibuprofen  Tablet. 400 milliGRAM(s) Oral every 12 hours  oxyCODONE  ER Tablet 30 milliGRAM(s) Oral every 12 hours  pantoprazole    Tablet 40 milliGRAM(s) Oral before breakfast  polyethylene glycol 3350 17 Gram(s) Oral daily  senna 2 Tablet(s) Oral at bedtime    MEDICATIONS  (PRN):  acetaminophen     Tablet .. 650 milliGRAM(s) Oral every 6 hours PRN Temp greater or equal to 38C (100.4F), Mild Pain (1 - 3)  bisacodyl 5 milliGRAM(s) Oral every 12 hours PRN Constipation  HYDROmorphone  Injectable 2 milliGRAM(s) IV Push every 6 hours PRN Severe Pain (7 - 10)  oxyCODONE    IR 10 milliGRAM(s) Oral every 6 hours PRN Moderate Pain (4 - 6)  sodium chloride 0.9% lock flush 10 milliLiter(s) IV Push every 1 hour PRN Pre/post blood products, medications, blood draw, and to maintain line patency    Vital Signs Last 24 Hrs  T(C): 36.7 (09 Mar 2025 07:19), Max: 36.8 (08 Mar 2025 23:50)  T(F): 98.1 (09 Mar 2025 07:19), Max: 98.2 (08 Mar 2025 23:50)  HR: 78 (09 Mar 2025 07:19) (78 - 84)  BP: 128/72 (09 Mar 2025 07:19) (118/65 - 128/80)  BP(mean): --  RR: 18 (09 Mar 2025 07:19) (18 - 18)  SpO2: 98% (09 Mar 2025 07:19) (96% - 100%)    Parameters below as of 09 Mar 2025 07:19  Patient On (Oxygen Delivery Method): room air        I&O's Summary    08 Mar 2025 06:01  -  09 Mar 2025 07:00  --------------------------------------------------------  IN: 0 mL / OUT: 500 mL / NET: -500 mL    09 Mar 2025 07:01  -  09 Mar 2025 14:59  --------------------------------------------------------  IN: 0 mL / OUT: 500 mL / NET: -500 mL          Physical Exam:   GENERAL: NAD, well-groomed, well-developed  HEENT: VINNY/   Atraumatic, Normocephalic  ENMT: No tonsillar erythema, exudates, or enlargement; Moist mucous membranes, Good dentition, No lesions  NECK: Supple, No JVD, Normal thyroid  CHEST/LUNG: Clear to auscultaion  CVS: Regular rate and rhythm; No murmurs, rubs, or gallops  GI: : Soft, Nontender, Nondistended; Bowel sounds present  NERVOUS SYSTEM:  Alert & Oriented X3  EXTREMITIES:  cyanosed digits  LYMPH: No lymphadenopathy noted  SKIN: No rashes or lesions  ENDOCRINOLOGY: No Thyromegaly  PSYCH: Appropriate    Labs:                              9.8    11.58 )-----------( 186      ( 09 Mar 2025 07:09 )             33.3                         7.5    12.94 )-----------( 204      ( 08 Mar 2025 09:28 )             24.5                         9.0    14.13 )-----------( 216      ( 07 Mar 2025 08:57 )             29.7                         7.6    12.14 )-----------( 183      ( 06 Mar 2025 09:51 )             24.7     03-09    135  |  98  |  14  ----------------------------<  86  4.3   |  22  |  2.31[H]  03-07    135  |  96  |  20  ----------------------------<  100[H]  3.7   |  26  |  2.90[H]    Ca    7.1[L]      09 Mar 2025 07:09      CAPILLARY BLOOD GLUCOSE              Urinalysis Basic - ( 09 Mar 2025 07:09 )    Color: x / Appearance: x / SG: x / pH: x  Gluc: 86 mg/dL / Ketone: x  / Bili: x / Urobili: x   Blood: x / Protein: x / Nitrite: x   Leuk Esterase: x / RBC: x / WBC x   Sq Epi: x / Non Sq Epi: x / Bacteria: x        rad< from: Xray Chest 1 View- PORTABLE-Urgent (Xray Chest 1 View- PORTABLE-Urgent .) (03.01.25 @ 22:48) >  PROCEDURE DATE:  03/01/2025          INTERPRETATION:  EXAMINATION: XR CHEST URGENT    CLINICAL INDICATION: cough    TECHNIQUE: Single frontal, portable view of the chest was obtained.    COMPARISON: Chest x-ray 2/20/2025.    FINDINGS:  Right-sided tunneled hemodialysis catheter terminates in the SVC.  The heart is not accurately assessed in this AP projection.  The lungs are clear.  There is no pneumothorax or pleural effusion.  No acute bony abnormality.    IMPRESSION:  Clear lungs.    --- End of Report ---          GILBERT LEON MD; Resident Radiologist  This document has been electronically signed.   JESUS HALEY DO; Attending Interventional Radiologist  This document has been electronically signed. Mar  2 2025  9:37AM    < end of copied text >      RECENT CULTURES:        RESPIRATORY CULTURES:          Studies  Chest X-RAY  CT SCAN Chest   Venous Dopplers: LE:   CT Abdomen  Others

## 2025-03-09 NOTE — PROGRESS NOTE ADULT - SUBJECTIVE AND OBJECTIVE BOX
Subjective: Patient seen and examined. No new events except as noted.     REVIEW OF SYSTEMS:    CONSTITUTIONAL: +weakness, fevers or chills  EYES/ENT: No visual changes;  No vertigo or throat pain   NECK: No pain or stiffness  RESPIRATORY: No cough, wheezing, hemoptysis; No shortness of breath  CARDIOVASCULAR: No chest pain or palpitations  GASTROINTESTINAL: No abdominal or epigastric pain. No nausea, vomiting, or hematemesis; No diarrhea or constipation. No melena or hematochezia.  GENITOURINARY: No dysuria, frequency or hematuria  NEUROLOGICAL: No numbness or weakness  SKIN: No itching, burning, rashes, or lesions   All other review of systems is negative unless indicated above.    MEDICATIONS:  MEDICATIONS  (STANDING):  albuterol/ipratropium for Nebulization 3 milliLiter(s) Nebulizer every 6 hours  aspirin  chewable 81 milliGRAM(s) Oral daily  atorvastatin 40 milliGRAM(s) Oral at bedtime  chlorhexidine 2% Cloths 1 Application(s) Topical daily  clopidogrel Tablet 75 milliGRAM(s) Oral daily  epoetin aleah-epbx (RETACRIT) Injectable 12990 Unit(s) IV Push <User Schedule>  guaiFENesin ER 1200 milliGRAM(s) Oral every 12 hours  hydrOXYzine hydrochloride 25 milliGRAM(s) Oral three times a day  ibuprofen  Tablet. 400 milliGRAM(s) Oral every 12 hours  oxyCODONE  ER Tablet 30 milliGRAM(s) Oral every 12 hours  pantoprazole    Tablet 40 milliGRAM(s) Oral before breakfast  polyethylene glycol 3350 17 Gram(s) Oral daily  senna 2 Tablet(s) Oral at bedtime      PHYSICAL EXAM:  T(C): 36.7 (03-09-25 @ 07:19), Max: 36.8 (03-08-25 @ 23:50)  HR: 78 (03-09-25 @ 07:19) (78 - 84)  BP: 128/72 (03-09-25 @ 07:19) (118/65 - 128/80)  RR: 18 (03-09-25 @ 07:19) (17 - 18)  SpO2: 98% (03-09-25 @ 07:19) (94% - 100%)  Wt(kg): --  I&O's Summary    08 Mar 2025 06:01  -  09 Mar 2025 07:00  --------------------------------------------------------  IN: 0 mL / OUT: 500 mL / NET: -500 mL          Appearance: NAD  HEENT:  Dry oral mucosa, PERRL, EOMI	  Lymphatic: No lymphadenopathy  Cardiovascular: Normal S1 S2, No JVD, No murmurs, No edema  Respiratory: Lungs clear to auscultation	  Psychiatry: A & O x 3, Mood & affect appropriate  Skin: No rashes, No ecchymoses, No cyanosis	  Neurologic: Non-focal  GI/Abd: Soft, obese, nontender. Dressing to [facility] C/D/I. Suprapubic catheter in place  Ext: Palp femoral pulses bilat. BLE atrophic, minimal dorsi/plantarflexion bilaterally. Sensation grossly intact. LLE edematous compared to R, erythema to right toes/forefoot. mottling of right toes/forefoot/heel  LLE:  small superficial abrasion, +edema and +ecchymosis over L knee  +TTP over L knee, no TTP along remainder of extremity; compartments soft  Limited ROM at knee 2/2 pain  No gross varus/valgus laxity, but assessment limited 2/2 pain  Motor: TA/EHL/GS/FHL intact  Sensory: DP/SP/Tib/Jordan/Saph SILT  +DP pulse (symmetric relative to contralateral side), WWP   pressure wound from the LLE immobilizer posteriorly proximal to the knee.  Vascular: Peripheral pulses palpable 2+ bilaterally    LABS:    CARDIAC MARKERS:                                9.8    11.58 )-----------( 186      ( 09 Mar 2025 07:09 )             33.3     03-09    135  |  98  |  14  ----------------------------<  86  4.3   |  22  |  2.31[H]    Ca    7.1[L]      09 Mar 2025 07:09      proBNP:   Lipid Profile:   HgA1c:   TSH:             TELEMETRY: 	    ECG:  	  RADIOLOGY:   DIAGNOSTIC TESTING:  [ ] Echocardiogram:  [ ]  Catheterization:  [ ] Stress Test:    OTHER:

## 2025-03-09 NOTE — PHYSICAL THERAPY INITIAL EVALUATION ADULT - ADDITIONAL COMMENTS
Pt lives w/ wife in a pvt house w/ ramp assess. Pt +electric wheelchair. PTA pt assisted w/ transfers by wife

## 2025-03-09 NOTE — PROGRESS NOTE ADULT - PROBLEM SELECTOR PLAN 6
-Monitor leukocytosis/fever curve  -CXR as above, no clear infiltrate  -ID f/u.  3/9: WBC slowly downtredning

## 2025-03-10 NOTE — PROGRESS NOTE ADULT - PROBLEM SELECTOR PLAN 6
-Monitor leukocytosis/fever curve  -CXR as above, no clear infiltrate  -ID f/u.  3/9: WBC slowly downtrending  3/10: con tto downtrend

## 2025-03-10 NOTE — PROGRESS NOTE ADULT - ASSESSMENT
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 The initial plan was that he would present to the [facility] ER Monday 2/17 for HD initiation. However, day of admission, he fell and sustained trauma to his knee. Called to consult for cough, elevated R hemidiaphragm.

## 2025-03-10 NOTE — PROGRESS NOTE ADULT - ASSESSMENT
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 He is well known to me from multiple recent admisions  s/p admissions at [facility] 12/20-12/25/24 and 2/4-2/7 with SONDRA on CKD with associated uremic symptoms.   At the last admission he had expressed strong interest to try to hold off with HD intiation but he has been getting worse clinically at home.  His SONDRA and uremic symptoms significantly improved after the first admission; they improved a to mild extent during the 2nd admission to the point where he was able to be discharged without HD. Since then, however, he has worsened further.   He has been suffering from progressively worsening diffuse pruritus, loss of appetite, and generalized weakness and falls.   His nephrologist and PCP has been speaking with him and his family over the past few days,   The initial plan was that he would present to the [facility] ER Monday 2/17 (ie tomorrow) for HD initiation. Today, however, he fell and sustained trauma to his knee. Given the fall and concern for knee fracture, he presented to the ER today. multiple xrays show no Fractures.      CKD5, uremia, requiring initiation of HD  Rash, seborrheic dermatitis  Pruritis  Anemia  PAD  SP catheter, chronic  Left inferior pole acute on chronic patella Fx    plan  - HD via R subclavian permacath  -3/10:  ptn is awake, alert, wife at bedside, i instructed them to give rehab choices. also awaiting SPC change to be done by urology. pain is controlled  -3/9: seen by PT, will refer to [facility]. choices to be given in AM. this was d/w ptn, daughter, wife.   -3/8:  ptn didnt want to get PT eval done, will reorder. ptn needs [facility] placement. PMNR consult ordered  - 3/7: ptn is alert , pain is controlled, DC planning d/w ptn and HCP, daughter Yanique  -3/6:  ptn is awake, alert, ecchymotic feet look improved, pain is controlled. DC planning to [facility]  3/4-5 - s/p b/l iliac arteries angioplasty and stent placements on 3/3. today has new ecchymoses in b/l LE, concern for arterial insufficiency. vascular aware.. wound from Left knee immobilizer is dressed and healing. pain is controlled.   LEFT UE AVF placement/scheduling will be on outptn basis as per vascular. dc planning to [facility]  - 3/3: ptn is s/p angiogram RLE : b/l iliac arteries w successful angioplasty and stents. in PACU. awaitng HD.  ptn has a pressure wound from the LLE immobilizer posteriorly proximal to the knee. its been on 2/2 knee fracture, applied by ortho and managed by ptn's wife as per ptn's and wife's request , this was d/w nursing and nurse manager ms Ruiz.. immobilizer should be managed by orthopedics and nursing. will keep it off, only keep on when repositioning for care and then remove. wound care to F/U . this was discussed at length w daughter Yanique and wife Dee Dee.   -3/1-3/2: ptn is smiling, pain free, had HD 2/28 and 3/1, awaiting RLE angiogram 3/3, on Heparin drip, euvolemic  -2/28: ptn is lethargic, awaiting RLE angiogram possible fem-fem bypass hopefully on 3/3 pending OR availability, awaiting HD today    -2/27:  plan for angiogram in am with possible angioplasty, possible stent, possible fem-fem bypass. medically cleared for angiogram and possible surgery, stent, angioplasty. pain is controlled. remains on heparin drip as per vascular. HD as per renal    -2/26:  ptn is sleeping, pain is controlled, he was seen by wound care and vascular attending. planning on angiogram 2/2 severe PAD in LE on CTA. he also spoke to HCP. ptn and HCP in agreement. ptn was started on HEPARIN drip as per vascular recs. RIJ permacath done 2/25    - 2/25: ptn states he is hallucinating, but not at present, his MS is at baseline, his pain is controlled, he still needs prn pain meds when he is moved in the bed or for testing or for HD. awaiting Permacath, AVF creation, LE bypass to be d/w Dr. Swenson and the ptn tomorrow. ptn has cardiology clearance  if he opts for. Ptn has sacral decubiti and posterior thigh and buttock decibiti and b/l heel decubiti. Z flow bootie d/w RN, get wound care consult    -2/24: pain is controlled  if the LLE is immobile and fully extended. doesn't tolerate the knee immobilizer. has a medium condyle and acute on chronic patella fx in LEFT knee. as per vascular ptn will need iliac stent  w a fem-fem bypass, possible axillo-femoral bypass. for this surgery he would need cardiac work up . more pressing surgery is LUE AVF creation,  in IR will arrange for Permacath. for pain control: Motrin 400 mg q12H, Oxy ER 30 mg q12H, Oxy IR 10 for mod pain and dilaudid 2 mg iv for severe pain. still has pruritis though improved, will raise atarax 25 mg to tid. plan of care d/w daughter Norma Persaud , ptn and his wife. Also d/w renal, card, vascular, ACP    -2/23: Daughter Yanique is the HCP, she and the ptn filled out the paperwork. daily plan and findings d/w her and the ptn. MS is at abseline, c/o b/l LE foot pain and Left Knee pain. xrays of left knee: cannot r/o acute on chronic inferior pole patellar Fx. knee immobilizer is on, awaiting ortho consult. doubt needs any intervention awaiting Ct chest today, will add CT Left knee. ptn states itching has recurred, severe pain has recurred. will resume Atatrax ( 25 mg bid) and Oxycodone ER , but at a lower dose 15 mg q12H. cont prn analgesics. HD as per renal, awaiting Vascular consult f/u re CTA A/L &LE and recs. ptn also wants AVF surgery on this admission. Coughing has stopped    - 2/22: ptn is drowsy but arousable, calmer today, answers questions appropriately. he is pain free, he stopped coughing, he denies having pruritis: will DC:  OXY ER, Atarax, Tessalon perles.     -  2/21: ptn is tearful, a bit confused, coughing, recognizes me and family at bedside. GOC d/w daughter and wife. AMS prob delirium 2/2 acute illness +/- opiods, lyrica, atarac. will lower atarak to tid, dc lyrica, cont Oxy 2/2 ptn has severe LE pain. neuro called for eval. Head ct done yesterday w no acute findings. CTA A/P w LE run fof: severe PAD, vascular to follow up on plan fo care. plan for HD tomorrow and next week place on tiw schedule of MWF. will need tunneled catheter prior to DC and will d/w vascular scheduling of AVF. ptn wants all to be done while inptn. daughter wants to be HCP as per ptn's wishes. she was given paperwork to get it signed and witnessed and will present to nursing thereafter. details of findings and complaints and plan of care d/w daughter and wife. spent 60 min. RVP ordered. start mucinex , tessalon perles, duonebs, get CT chest to r/o PNA. pulm called    -  2/20:  ptn had RRT 2/2 AMS and tremors. no SZ, no LOC. noted to have Na 123( hyponatremia), given 500 cc NS. Gabapentin DCed. ptn had been taking gabapentin at home PTA. Pain is controlled. Pruritis resolved, tolerating HD otherwise.     - Pain management:  cont Oxycodone 10 IR q6H,  DIlaudid prn severe pain 2 mg q4H prn.   - cont outptn meds  - DVT ppx w HSC

## 2025-03-10 NOTE — PROGRESS NOTE ADULT - SUBJECTIVE AND OBJECTIVE BOX
Date of Service: 03-10-25 @ 14:02    Patient is a 74y old  Male who presents with a chief complaint of ESRD requiring HD, uremia (10 Mar 2025 13:12)      Any change in ROS: seems to be doing  ok :  no sob:  no cough ;   no phlegm     MEDICATIONS  (STANDING):  albuterol/ipratropium for Nebulization 3 milliLiter(s) Nebulizer every 6 hours  aspirin  chewable 81 milliGRAM(s) Oral daily  atorvastatin 40 milliGRAM(s) Oral at bedtime  chlorhexidine 2% Cloths 1 Application(s) Topical daily  clopidogrel Tablet 75 milliGRAM(s) Oral daily  epoetin aleah-epbx (RETACRIT) Injectable 20613 Unit(s) IV Push <User Schedule>  guaiFENesin ER 1200 milliGRAM(s) Oral every 12 hours  hydrOXYzine hydrochloride 25 milliGRAM(s) Oral three times a day  ibuprofen  Tablet. 400 milliGRAM(s) Oral every 12 hours  oxyCODONE  ER Tablet 30 milliGRAM(s) Oral every 12 hours  pantoprazole    Tablet 40 milliGRAM(s) Oral before breakfast  polyethylene glycol 3350 17 Gram(s) Oral daily  senna 2 Tablet(s) Oral at bedtime    MEDICATIONS  (PRN):  acetaminophen     Tablet .. 650 milliGRAM(s) Oral every 6 hours PRN Temp greater or equal to 38C (100.4F), Mild Pain (1 - 3)  bisacodyl 5 milliGRAM(s) Oral every 12 hours PRN Constipation  HYDROmorphone  Injectable 2 milliGRAM(s) IV Push every 6 hours PRN Severe Pain (7 - 10)  oxyCODONE    IR 10 milliGRAM(s) Oral every 6 hours PRN Moderate Pain (4 - 6)  sodium chloride 0.9% lock flush 10 milliLiter(s) IV Push every 1 hour PRN Pre/post blood products, medications, blood draw, and to maintain line patency    Vital Signs Last 24 Hrs  T(C): 36.2 (10 Mar 2025 10:45), Max: 36.8 (09 Mar 2025 16:25)  T(F): 97.2 (10 Mar 2025 10:45), Max: 98.3 (09 Mar 2025 16:25)  HR: 78 (10 Mar 2025 10:45) (77 - 85)  BP: 125/53 (10 Mar 2025 10:45) (99/64 - 153/79)  BP(mean): --  RR: 18 (10 Mar 2025 10:45) (18 - 18)  SpO2: 98% (10 Mar 2025 10:45) (98% - 98%)    Parameters below as of 10 Mar 2025 10:45  Patient On (Oxygen Delivery Method): room air        I&O's Summary    09 Mar 2025 07:01  -  10 Mar 2025 07:00  --------------------------------------------------------  IN: 0 mL / OUT: 600 mL / NET: -600 mL    10 Mar 2025 07:01  -  10 Mar 2025 14:02  --------------------------------------------------------  IN: 0 mL / OUT: 0 mL / NET: 0 mL          Physical Exam:   GENERAL: NAD, well-groomed, well-developed  HEENT: VINNY/   Atraumatic, Normocephalic  ENMT: No tonsillar erythema, exudates, or enlargement; Moist mucous membranes, Good dentition, No lesions  NECK: Supple, No JVD, Normal thyroid  CHEST/LUNG: Clear to auscultaion  CVS: Regular rate and rhythm; No murmurs, rubs, or gallops  GI: : Soft, Nontender, Nondistended; Bowel sounds present  NERVOUS SYSTEM:  Alert & Oriented X3  EXTREMITIES:gangrenous digits  LYMPH: No lymphadenopathy noted  SKIN: No rashes or lesions  ENDOCRINOLOGY: No Thyromegaly  PSYCH: Appropriate    Labs:                              9.8    11.58 )-----------( 186      ( 09 Mar 2025 07:09 )             33.3                         7.5    12.94 )-----------( 204      ( 08 Mar 2025 09:28 )             24.5                         9.0    14.13 )-----------( 216      ( 07 Mar 2025 08:57 )             29.7     03-09    135  |  98  |  14  ----------------------------<  86  4.3   |  22  |  2.31[H]  03-07    135  |  96  |  20  ----------------------------<  100[H]  3.7   |  26  |  2.90[H]    Ca    7.1[L]      09 Mar 2025 07:09      CAPILLARY BLOOD GLUCOSE              Urinalysis Basic - ( 09 Mar 2025 07:09 )    Color: x / Appearance: x / SG: x / pH: x  Gluc: 86 mg/dL / Ketone: x  / Bili: x / Urobili: x   Blood: x / Protein: x / Nitrite: x   Leuk Esterase: x / RBC: x / WBC x   Sq Epi: x / Non Sq Epi: x / Bacteria: x      rad< from: Xray Chest 1 View- PORTABLE-Urgent (Xray Chest 1 View- PORTABLE-Urgent .) (03.01.25 @ 22:48) >    PROCEDURE DATE:  03/01/2025          INTERPRETATION:  EXAMINATION: XR CHEST URGENT    CLINICAL INDICATION: cough    TECHNIQUE: Single frontal, portable view of the chest was obtained.    COMPARISON: Chest x-ray 2/20/2025.    FINDINGS:  Right-sided tunneled hemodialysis catheter terminates in the SVC.  The heart is not accurately assessed in this AP projection.  The lungs are clear.  There is no pneumothorax or pleural effusion.  No acute bony abnormality.    IMPRESSION:  Clear lungs.    --- End of Report ---          GLIBERT LEON MD; Resident Radiologist  This document has been electronically signed.   JESUS HALEY DO; Attending Interventional Radiologist    < end of copied text >        RECENT CULTURES:        RESPIRATORY CULTURES:          Studies  Chest X-RAY  CT SCAN Chest   Venous Dopplers: LE:   CT Abdomen  Others

## 2025-03-10 NOTE — PROGRESS NOTE ADULT - PROBLEM SELECTOR PLAN 4
-Per vascular  -S/p b/l iliac stent placement on 3/3/25  -Podiatry f/u.  3/9: has cyanosed digits:  defer to vascular  /: vascular following

## 2025-03-10 NOTE — PROGRESS NOTE ADULT - SUBJECTIVE AND OBJECTIVE BOX
Subjective: Patient seen and examined. No new events except as noted.   Pt seen at dialysis    REVIEW OF SYSTEMS:  unable to obtain    MEDICATIONS:  MEDICATIONS  (STANDING):  albuterol/ipratropium for Nebulization 3 milliLiter(s) Nebulizer every 6 hours  aspirin  chewable 81 milliGRAM(s) Oral daily  atorvastatin 40 milliGRAM(s) Oral at bedtime  chlorhexidine 2% Cloths 1 Application(s) Topical daily  clopidogrel Tablet 75 milliGRAM(s) Oral daily  epoetin aleah-epbx (RETACRIT) Injectable 64922 Unit(s) IV Push <User Schedule>  guaiFENesin ER 1200 milliGRAM(s) Oral every 12 hours  hydrOXYzine hydrochloride 25 milliGRAM(s) Oral three times a day  ibuprofen  Tablet. 400 milliGRAM(s) Oral every 12 hours  oxyCODONE  ER Tablet 30 milliGRAM(s) Oral every 12 hours  pantoprazole    Tablet 40 milliGRAM(s) Oral before breakfast  polyethylene glycol 3350 17 Gram(s) Oral daily  senna 2 Tablet(s) Oral at bedtime      PHYSICAL EXAM:  T(C): 36.2 (03-10-25 @ 10:45), Max: 36.8 (03-09-25 @ 16:25)  HR: 78 (03-10-25 @ 10:45) (77 - 85)  BP: 125/53 (03-10-25 @ 10:45) (99/64 - 153/79)  RR: 18 (03-10-25 @ 10:45) (18 - 18)  SpO2: 98% (03-10-25 @ 10:45) (98% - 98%)  Wt(kg): --  I&O's Summary    09 Mar 2025 07:01  -  10 Mar 2025 07:00  --------------------------------------------------------  IN: 0 mL / OUT: 600 mL / NET: -600 mL    10 Mar 2025 07:01  -  10 Mar 2025 11:48  --------------------------------------------------------  IN: 0 mL / OUT: 0 mL / NET: 0 mL          Appearance: NAD  HEENT:  Dry oral mucosa, PERRL, EOMI	  Lymphatic: No lymphadenopathy  Cardiovascular: Normal S1 S2, No JVD, No murmurs, No edema  Respiratory: Lungs clear to auscultation	  Psychiatry: A & O x 3, Mood & affect appropriate  Skin: No rashes, No ecchymoses, No cyanosis	  Neurologic: Non-focal  GI/Abd: Soft, obese, nontender. Dressing to LLQ C/D/I. Suprapubic catheter in place  Ext: Palp femoral pulses bilat. BLE atrophic, minimal dorsi/plantarflexion bilaterally. Sensation grossly intact. LLE edematous compared to R, erythema to right toes/forefoot. mottling of right toes/forefoot/heel  LLE:  small superficial abrasion, +edema and +ecchymosis over L knee  +TTP over L knee, no TTP along remainder of extremity; compartments soft  Limited ROM at knee 2/2 pain  No gross varus/valgus laxity, but assessment limited 2/2 pain  Motor: TA/EHL/GS/FHL intact  Sensory: DP/SP/Tib/Jordan/Saph SILT  +DP pulse (symmetric relative to contralateral side), WWP   pressure wound from the LLE immobilizer posteriorly proximal to the knee.  Vascular: Peripheral pulses palpable 2+ bilaterally      LABS:    CARDIAC MARKERS:                                9.8    11.58 )-----------( 186      ( 09 Mar 2025 07:09 )             33.3     03-09    135  |  98  |  14  ----------------------------<  86  4.3   |  22  |  2.31[H]    Ca    7.1[L]      09 Mar 2025 07:09      proBNP:   Lipid Profile:   HgA1c:   TSH:             TELEMETRY: 	    ECG:  	  RADIOLOGY:   DIAGNOSTIC TESTING:  [ ] Echocardiogram:  [ ]  Catheterization:  [ ] Stress Test:    OTHER:

## 2025-03-10 NOTE — PROGRESS NOTE ADULT - SUBJECTIVE AND OBJECTIVE BOX
Overnight events noted      VITAL:  T(C): , Max: 36.8 (03-09-25 @ 16:25)  T(F): , Max: 98.3 (03-09-25 @ 16:25)  HR: 77 (03-10-25 @ 00:27)  BP: 153/79 (03-10-25 @ 00:27)  BP(mean): --  RR: 18 (03-10-25 @ 00:27)  SpO2: 98% (03-10-25 @ 00:27)  Wt(kg): --      PHYSICAL EXAM:  Constitutional: alert, NAD  HEENT: NCAT, DMM  Neck: Supple, No JVD  Respiratory: CTA-b/l  Cardiovascular: RRR s1s2, no m/r/g  Gastrointestinal: BS+, soft, NT/ND  Extremities: No peripheral edema b/l  Neurological: reduced generalized strength  Back: no CVAT b/l  Skin: (+)cyanotic changes b/l feet  Access: RIJ tunneled cath    LABS:                        9.8    11.58 )-----------( 186      ( 09 Mar 2025 07:09 )             33.3     Na(135)/K(4.3)/Cl(98)/HCO3(22)/BUN(14)/Cr(2.31)Glu(86)/Ca(7.1)/Mg(--)/PO4(--)    03-09 @ 07:09  Na(135)/K(3.7)/Cl(96)/HCO3(26)/BUN(20)/Cr(2.90)Glu(100)/Ca(7.0)/Mg(1.9)/PO4(1.7)    03-07 @ 08:57        IMPRESSION: 74M w/ polio, neurogenic bladder/suprapubic catheter, 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 Last dialyzed Saturday 3/8; we can plan for HD today to get him back onto MWF schedule    (2)Hypophosphatemia - now off PO4 binders and off dietary PO4 restriction    (3)Anemia - s/p IV iron; on Retacrit with HD    (4)PAD - s/p LE bypass 3/3/25 - cyanotic changes of toes b/l      RECOMMEND:   (1)HD today as ordered -no net UF - high-Na+ bath; Retacrit with HD  (2)D/C planning per primary team              Rafita Denny MD  [facility]  Office/on call physician: [phone]  Cell (7a-7p): [phone]       Seen on HD - no pain, no sob      VITAL:  T(C): , Max: 36.8 (03-09-25 @ 16:25)  T(F): , Max: 98.3 (03-09-25 @ 16:25)  HR: 77 (03-10-25 @ 00:27)  BP: 153/79 (03-10-25 @ 00:27)  BP(mean): --  RR: 18 (03-10-25 @ 00:27)  SpO2: 98% (03-10-25 @ 00:27)  Wt(kg): --      PHYSICAL EXAM:  Constitutional: alert, NAD  HEENT: NCAT, DMM  Neck: Supple, No JVD  Respiratory: CTA-b/l  Cardiovascular: RRR s1s2, no m/r/g  Gastrointestinal: BS+, soft, NT/ND  : (+)suprapubic cath  Extremities: No peripheral edema b/l  Neurological: reduced generalized strength  Back: no CVAT b/l  Skin: (+)cyanotic changes b/l feet  Access: RIJ tunneled cath-accessed    LABS:                        9.8    11.58 )-----------( 186      ( 09 Mar 2025 07:09 )             33.3     Na(135)/K(4.3)/Cl(98)/HCO3(22)/BUN(14)/Cr(2.31)Glu(86)/Ca(7.1)/Mg(--)/PO4(--)    03-09 @ 07:09  Na(135)/K(3.7)/Cl(96)/HCO3(26)/BUN(20)/Cr(2.90)Glu(100)/Ca(7.0)/Mg(1.9)/PO4(1.7)    03-07 @ 08:57        IMPRESSION: 74M w/ polio, neurogenic bladder/suprapubic catheter, 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 Last dialyzed Saturday 3/8; we can plan for HD today to get him back onto MWF schedule    (2)Hypophosphatemia - now off PO4 binders and off dietary PO4 restriction    (3)Anemia - s/p IV iron; on Retacrit with HD    (4)PAD - s/p LE bypass 3/3/25 - cyanotic changes of toes b/l    (5) - needing suprapubic catheter exchange prior to discharge?      RECOMMEND:   (1)HD today as ordered -no net UF - high-Na+ bath; Retacrit with HD  (2)F/U  input              Rafita Denny MD  [facility]  Office/on call physician: [phone]  Cell (7a-7p): [phone]

## 2025-03-10 NOTE — PROGRESS NOTE ADULT - ASSESSMENT
74 year old male with CKD, sp polio, paraplegia with associated neurogenic bladder s/p suprapubic catheter who was admitted s/p fall on 2/16.   CKD - ESRD, now s/p DEYA [place] 2/17, on HD  ruled out cellulitis around suprapubic cath  2/20 s/p RRT for tremors and confusion -- pt with chronic tremors although notably worse  hyponatremia    mrsa negative   2/20 CXR with clear lungs   SPC site with chronic/stable inflammatory changes, no drainage - no sign of SSTI  CTA abd with occlusion of b/l external iliac arteries and b/l superficial femoral arteries with distal reconstitution at popliteal arteries; patent three vessel runoff of RLE with 2 vessel runoff of LLE with occlusion of L mid anterior tibial artery with distal reconstitution  Bcx negative to date   mental status at baseline  WBC noted post op-stable overall, remains afebrile   Vascular following for worsening PAD with worsening ischemic changes   -3/3 s/p RLE angiogram b/l iliac artery stents     s/p vancomycin x1 2/20  s/p zosyn 2/20-2/22      Recommendations:   Continue off antibiotics   Vascular surgery following  HD per renal    Monitor temps/WBC  Continue rest of care per primary team   Stable from ID standpoint at this time.     Bridgette Ramirez M.D.  [facility]  Available on [employer] TEAMS - *PREFERRED*  [phone]  After 5pm on weekdays and all day on weekends - please call [phone]     Thank you for consulting us and involving us in the management of this patients case. In addition to reviewing history, imaging, documents, labs, microbiology, took into account antibiotic stewardship, local antibiogram and infection control strategies and potential transmission issues at time of treatment decision making process.  no

## 2025-03-10 NOTE — PROGRESS NOTE ADULT - ASSESSMENT
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  came in after fall and for HD.     2/20 CTH neg   \Na 123 --> now 133   A1c 5.7   TSH WNL 3.46   o/e 2/21 AAOx2, uppers 4/5, lowers limited .  2/23; family bedside upset that he has pain in leg after he was moved.  patient going for imaging now.   s/p R IJ permacath by IR 2/25 2/27 AAOx3     Imrpssion  1) AMS, waxing and waing , likely multifactorial from infection, metabolic/electrolyte derrangements/ delerium / medication effect , ureemia which is imporving   2) polio  3) fall 2/2 weakness  4) hyponatremia to 123 2/20  improved 133 -->136    - f/u  regarding suprapubic catheter exchange   - f/u podiatry./ f/u vascular ; scheduled procedure with Dr. Swenson outpatient   - monitor puleses in distal LE; f/u vascular    - eventually AVF  - f/u vascular; no vascluar options   - on DAPT   - was getting oxycodone ER 30mg BID and oxy PRN IR i10 and dilauded PRN ;   - f/u psych   - limit sedating meds   - continue to monitor and correct metabolic derrangements .  - delerium precuations.   - frequent re orientation  - check b12, RPR, ammonia level   - will consdier MRI brain or EEG if doesn't improve but seems to have been improving   - PT/OT   - check FS, glucose control <180  - GI/DVT ppx  - Thank you for allowing me to participate in the care of this patient. Call with questions.    spoke with wife 3/10   Paul Limon MD  Vascular Neurology  Office: [phone]

## 2025-03-10 NOTE — PROCEDURE NOTE - NSURITECHNIQUE_GU_A_CORE
Proper hand hygiene was performed/Sterile gloves were worn for the duration of the procedure/A sterile drape was used to cover all adjacent areas/The site was cleaned with soap/water and sterile solution (betadine)/The catheter was appropriately lubricated/The catheter was secured with a securement device (e.g. StatLock)/The urinary drainage system is closed at the end of the procedure/The collection bag is below the level of the patient and urinary bladder/All applicable medical record documentation is completed Proper hand hygiene was performed/Sterile gloves were worn for the duration of the procedure/A sterile drape was used to cover all adjacent areas/The site was cleaned with soap/water and sterile solution (betadine)/The catheter was appropriately lubricated/The catheter was secured with a securement device (e.g. StatLock)/The collection bag is below the level of the patient and urinary bladder/All applicable medical record documentation is completed

## 2025-03-10 NOTE — PROGRESS NOTE ADULT - PROBLEM SELECTOR PLAN 3
-Neuro following  -ABG 2/20 acceptable  -Pt lethargic 2/22, ABG never sent but AMS appears to be improving   -ABG 2/28 with no co2 retention.    3/9: he seems pretty alert and awake and responsive appropriately to questions  /: resolved:   he is alert sand awke and responsive to questions well : needs pt ot

## 2025-03-10 NOTE — PROGRESS NOTE ADULT - SUBJECTIVE AND OBJECTIVE BOX
[facility]  SABINO Griffin Y. Patel, S. Shah, G. Casimir  [phone]  ([phone] - weekdays after 5pm and weekends)    Name: BRIAN DEMPSEY  Age/Gender: 74y Male  MRN: [id number]    Interval History:  Patient seen and examined this morning.   No new complaints noted.  Notes reviewed  No concerning overnight events  Afebrile   Allergies: No Known Allergies      Objective:  Vitals:   T(F): 97.2 (03-10-25 @ 10:45), Max: 98.3 (03-09-25 @ 16:25)  HR: 78 (03-10-25 @ 10:45) (77 - 85)  BP: 125/53 (03-10-25 @ 10:45) (99/64 - 153/79)  RR: 18 (03-10-25 @ 10:45) (18 - 18)  SpO2: 98% (03-10-25 @ 10:45) (98% - 98%)  Physical Examination:  General: no acute distress  HEENT: normocephalic, atraumatic, anicteric  Respiratory: no acc muscle use, breathing comfortably  Cardiovascular: S1 and S2 present  Gastrointestinal: normal appearing, nondistended  Extremities: no edema, LE with ischemic changes       Laboratory Studies:  CBC:                       9.8    11.58 )-----------( 186      ( 09 Mar 2025 07:09 )             33.3     WBC Trend:  11.58 03-09-25 @ 07:09  12.94 03-08-25 @ 09:28  14.13 03-07-25 @ 08:57  12.14 03-06-25 @ 09:51  13.86 03-05-25 @ 10:21  14.41 03-04-25 @ 06:56    CMP: 03-09    135  |  98  |  14  ----------------------------<  86  4.3   |  22  |  2.31[H]    Ca    7.1[L]      09 Mar 2025 07:09      Creatinine: 2.31 mg/dL (03-09-25 @ 07:09)  Creatinine: 2.90 mg/dL (03-07-25 @ 08:57)  Creatinine: 3.28 mg/dL (03-05-25 @ 10:21)  Creatinine: 4.49 mg/dL (03-04-25 @ 06:55)  Creatinine: 4.49 mg/dL (03-04-25 @ 06:55)    Microbiology: reviewed   Radiology: reviewed     Medications:  acetaminophen     Tablet .. 650 milliGRAM(s) Oral every 6 hours PRN  albuterol/ipratropium for Nebulization 3 milliLiter(s) Nebulizer every 6 hours  aspirin  chewable 81 milliGRAM(s) Oral daily  atorvastatin 40 milliGRAM(s) Oral at bedtime  bisacodyl 5 milliGRAM(s) Oral every 12 hours PRN  chlorhexidine 2% Cloths 1 Application(s) Topical daily  clopidogrel Tablet 75 milliGRAM(s) Oral daily  epoetin aleah-epbx (RETACRIT) Injectable 78810 Unit(s) IV Push <User Schedule>  guaiFENesin ER 1200 milliGRAM(s) Oral every 12 hours  HYDROmorphone  Injectable 2 milliGRAM(s) IV Push every 6 hours PRN  hydrOXYzine hydrochloride 25 milliGRAM(s) Oral three times a day  ibuprofen  Tablet. 400 milliGRAM(s) Oral every 12 hours  oxyCODONE    IR 10 milliGRAM(s) Oral every 6 hours PRN  oxyCODONE  ER Tablet 30 milliGRAM(s) Oral every 12 hours  pantoprazole    Tablet 40 milliGRAM(s) Oral before breakfast  polyethylene glycol 3350 17 Gram(s) Oral daily  senna 2 Tablet(s) Oral at bedtime  sodium chloride 0.9% lock flush 10 milliLiter(s) IV Push every 1 hour PRN    Prior/Completed Antimicrobials:  piperacillin/tazobactam IVPB.  piperacillin/tazobactam IVPB.-  vancomycin  IVPB

## 2025-03-10 NOTE — PROCEDURE NOTE - ADDITIONAL PROCEDURE DETAILS
22F [place] tipped 10 cc cath placed with no complications 22F [place] tipped 10 cc cath placed with no complications.

## 2025-03-10 NOTE — CONSULT NOTE ADULT - SUBJECTIVE AND OBJECTIVE BOX
Patient is a 74y old  Male who presents with a chief complaint of ESRD requiring HD, uremia (10 Mar 2025 11:47)    Admission HPI:  74 year-old man with history of multiple medical issues including polio with associated neurogenic bladder/suprapubic catheter, iatrogenic rectal rupture requiring colostomy 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 He is well known to me from multiple recent admisions  s/p admissions at [facility] 12/20-12/25/24 and 2/4-2/7 with SONDRA on CKD with associated uremic symptoms.   At the last admission he had expressed strong interest to try to hold off with HD intiation but he has been getting worse clinically at home.  His SONDRA and uremic symptoms significantly improved after the first admission; they improved a to mild extent during the 2nd admission to the point where he was able to be discharged without HD. Since then, however, he has worsened further.   He has been suffering from progressively worsening diffuse pruritus, loss of appetite, and generalized weakness and falls.   His nephrologist and PCP has been speaking with him and his family over the past few days,   The initial plan was that he would present to the [facility] ER Monday 2/17 (ie tomorrow) for HD initiation. Today, however, he fell and sustained trauma to his knee. Given the fall and concern for knee fracture, he presented to the ER today. multiple xrays show no Fractures. (16 Feb 2025 19:41)    Interval History:  Patient has had a very complicated hospital course - with confusion - evaluated by neurology - multifactorial - due to infection, metabolic changes, delirium.  Course also c/b SONDRA.   Patient s/p iliac stent on 3/3/25.    REVIEW OF SYSTEMS: No chest pain, shortness of breath, nausea, vomiting or diarhea; other ROS neg     PAST MEDICAL & SURGICAL HISTORY  Polio    Diabetes    Pulmonary hypertension    Hypertension    H/O urinary retention    HLD (hyperlipidemia)    BPH with obstruction/lower urinary tract symptoms    Type 2 diabetes mellitus    Erectile dysfunction    Bladder outlet obstruction    Presence of suprapubic catheter    Colostomy status    DVT, lower extremity    H/O cardiac murmur    No significant past surgical history    S/P colostomy    Suprapubic catheter    H/O total hip arthroplasty, right    Presence of internal fixation gianluca in upper extremity    S/P ORIF (open reduction internal fixation) fracture    S/P cataract surgery    H/O shoulder surgery    FUNCTIONAL HISTORY:   Lives w wife - uses a power w/c. Wife assists with transfers and ADLs    CURRENT FUNCTIONAL STATUS:  Max A transfers    FAMILY HISTORY   FH: type 2 diabetes    MEDICATIONS   acetaminophen     Tablet .. 650 milliGRAM(s) Oral every 6 hours PRN  albuterol/ipratropium for Nebulization 3 milliLiter(s) Nebulizer every 6 hours  aspirin  chewable 81 milliGRAM(s) Oral daily  atorvastatin 40 milliGRAM(s) Oral at bedtime  bisacodyl 5 milliGRAM(s) Oral every 12 hours PRN  chlorhexidine 2% Cloths 1 Application(s) Topical daily  clopidogrel Tablet 75 milliGRAM(s) Oral daily  epoetin aleah-epbx (RETACRIT) Injectable 78036 Unit(s) IV Push <User Schedule>  guaiFENesin ER 1200 milliGRAM(s) Oral every 12 hours  HYDROmorphone  Injectable 2 milliGRAM(s) IV Push every 6 hours PRN  hydrOXYzine hydrochloride 25 milliGRAM(s) Oral three times a day  ibuprofen  Tablet. 400 milliGRAM(s) Oral every 12 hours  oxyCODONE    IR 10 milliGRAM(s) Oral every 6 hours PRN  oxyCODONE  ER Tablet 30 milliGRAM(s) Oral every 12 hours  pantoprazole    Tablet 40 milliGRAM(s) Oral before breakfast  polyethylene glycol 3350 17 Gram(s) Oral daily  senna 2 Tablet(s) Oral at bedtime  sodium chloride 0.9% lock flush 10 milliLiter(s) IV Push every 1 hour PRN    ALLERGIES  No Known Allergies    VITALS  T(C): 36.2 (03-10-25 @ 10:45), Max: 36.8 (03-09-25 @ 16:25)  HR: 78 (03-10-25 @ 10:45) (77 - 85)  BP: 125/53 (03-10-25 @ 10:45) (99/64 - 153/79)  RR: 18 (03-10-25 @ 10:45) (18 - 18)  SpO2: 98% (03-10-25 @ 10:45) (98% - 98%)  Wt(kg): --    PHYSICAL EXAM  Constitutional - NAD, Comfortable  HEENT - NCAT, EOMI  Neck - Supple  Chest - No distress, no use of accessory muscles for respiration  Cardiovascular -Well perfused  Abdomen - BS+, Soft, NTND  Extremities - No C/C/E, No calf tenderness   Neurologic Exam -                    Cognitive - Awake, Alert, AAO to self, place, date, year, situation     Communication - Fluent, No dysarthria, no aphasia     Cranial Nerves - CN 2-12 intact     Motor - No focal deficits      Sensory - Intact to LT     Reflexes - DTR Intact, No primitive reflexive  Psychiatric - Mood stable, Affect WNL    RECENT LABS/IMAGING  CBC Full  -  ( 09 Mar 2025 07:09 )  WBC Count : 11.58 K/uL  RBC Count : 3.67 M/uL  Hemoglobin : 9.8 g/dL  Hematocrit : 33.3 %  Platelet Count - Automated : 186 K/uL  Mean Cell Volume : 90.7 fl  Mean Cell Hemoglobin : 26.7 pg  Mean Cell Hemoglobin Concentration : 29.4 g/dL  Auto Neutrophil # : x  Auto Lymphocyte # : x  Auto Monocyte # : x  Auto Eosinophil # : x  Auto Basophil # : x  Auto Neutrophil % : x  Auto Lymphocyte % : x  Auto Monocyte % : x  Auto Eosinophil % : x  Auto Basophil % : x    03-09    135  |  98  |  14  ----------------------------<  86  4.3   |  22  |  2.31[H]    Ca    7.1[L]      09 Mar 2025 07:09      Urinalysis Basic - ( 09 Mar 2025 07:09 )    Color: x / Appearance: x / SG: x / pH: x  Gluc: 86 mg/dL / Ketone: x  / Bili: x / Urobili: x   Blood: x / Protein: x / Nitrite: x   Leuk Esterase: x / RBC: x / WBC x   Sq Epi: x / Non Sq Epi: x / Bacteria: x    Impression:  75 yo with functional deficits secondary to diagnosis of debility, polio    Plan:  PT- ROM, Bed Mob, Transfers, Amb w AD and bracing as needed  OT- ADLs, bracing  Prec- Falls, Cardiac, Pulm  DVT Prophylaxis  Skin- Turn q2 h  Dispo-     Total time taken to review relevant records and imaging results, examine patient, write note, and, when applicable, discuss case with patient, family, , resident, medical student and other medical providers:     [  ] 40 minutes (15828)  [  ] 55 minutes (23164)  [  ] 75 minutes (69735)    [  ] 25 minutes (32685)  [  ] 35 minutes (80510)  [  ] 50 minutes (96492)           Patient is a 74y old  Male who presents with a chief complaint of ESRD requiring HD, uremia (10 Mar 2025 11:47)    Admission HPI:  74 year-old man with history of multiple medical issues including polio with associated neurogenic bladder/suprapubic catheter, iatrogenic rectal rupture requiring colostomy 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 He is well known to me from multiple recent admisions  s/p admissions at [facility] 12/20-12/25/24 and 2/4-2/7 with SONDRA on CKD with associated uremic symptoms.   At the last admission he had expressed strong interest to try to hold off with HD intiation but he has been getting worse clinically at home.  His SONDRA and uremic symptoms significantly improved after the first admission; they improved a to mild extent during the 2nd admission to the point where he was able to be discharged without HD. Since then, however, he has worsened further.   He has been suffering from progressively worsening diffuse pruritus, loss of appetite, and generalized weakness and falls.   His nephrologist and PCP has been speaking with him and his family over the past few days,   The initial plan was that he would present to the [facility] ER Monday 2/17 (ie tomorrow) for HD initiation. Today, however, he fell and sustained trauma to his knee. Given the fall and concern for knee fracture, he presented to the ER today. multiple xrays show no Fractures. (16 Feb 2025 19:41)    Interval History:  Patient has had a very complicated hospital course - with confusion - evaluated by neurology - multifactorial - due to infection, metabolic changes, delirium.  Course also c/b SONDRA.   Patient s/p iliac stent on 3/3/25.    REVIEW OF SYSTEMS: + paraplegia, + mcconnell, No chest pain, shortness of breath, nausea, vomiting or diarhea; other ROS neg     PAST MEDICAL & SURGICAL HISTORY  Polio    Diabetes    Pulmonary hypertension    Hypertension    H/O urinary retention    HLD (hyperlipidemia)    BPH with obstruction/lower urinary tract symptoms    Type 2 diabetes mellitus    Erectile dysfunction    Bladder outlet obstruction    Presence of suprapubic catheter    Colostomy status    DVT, lower extremity    H/O cardiac murmur    No significant past surgical history    S/P colostomy    Suprapubic catheter    H/O total hip arthroplasty, right    Presence of internal fixation gianluca in upper extremity    S/P ORIF (open reduction internal fixation) fracture    S/P cataract surgery    H/O shoulder surgery    FUNCTIONAL HISTORY:   Lives w wife - uses a power w/c. Wife assists with transfers and ADLs    CURRENT FUNCTIONAL STATUS:  Max A transfers    FAMILY HISTORY   FH: type 2 diabetes    MEDICATIONS   acetaminophen     Tablet .. 650 milliGRAM(s) Oral every 6 hours PRN  albuterol/ipratropium for Nebulization 3 milliLiter(s) Nebulizer every 6 hours  aspirin  chewable 81 milliGRAM(s) Oral daily  atorvastatin 40 milliGRAM(s) Oral at bedtime  bisacodyl 5 milliGRAM(s) Oral every 12 hours PRN  chlorhexidine 2% Cloths 1 Application(s) Topical daily  clopidogrel Tablet 75 milliGRAM(s) Oral daily  epoetin aleah-epbx (RETACRIT) Injectable 11810 Unit(s) IV Push <User Schedule>  guaiFENesin ER 1200 milliGRAM(s) Oral every 12 hours  HYDROmorphone  Injectable 2 milliGRAM(s) IV Push every 6 hours PRN  hydrOXYzine hydrochloride 25 milliGRAM(s) Oral three times a day  ibuprofen  Tablet. 400 milliGRAM(s) Oral every 12 hours  oxyCODONE    IR 10 milliGRAM(s) Oral every 6 hours PRN  oxyCODONE  ER Tablet 30 milliGRAM(s) Oral every 12 hours  pantoprazole    Tablet 40 milliGRAM(s) Oral before breakfast  polyethylene glycol 3350 17 Gram(s) Oral daily  senna 2 Tablet(s) Oral at bedtime  sodium chloride 0.9% lock flush 10 milliLiter(s) IV Push every 1 hour PRN    ALLERGIES  No Known Allergies    VITALS  T(C): 36.2 (03-10-25 @ 10:45), Max: 36.8 (03-09-25 @ 16:25)  HR: 78 (03-10-25 @ 10:45) (77 - 85)  BP: 125/53 (03-10-25 @ 10:45) (99/64 - 153/79)  RR: 18 (03-10-25 @ 10:45) (18 - 18)  SpO2: 98% (03-10-25 @ 10:45) (98% - 98%)  Wt(kg): --    PHYSICAL EXAM  Constitutional - NAD, Comfortable  HEENT - NCAT, EOMI  Neck - Supple  Chest - No distress, no use of accessory muscles for respiration  Cardiovascular -Well perfused  Abdomen - BS+, Soft, NTND  Extremities - distal LEs with eccymoses, No calf tenderness   Neurologic Exam -                 AAO x 3  Motor 0/5 bl LEs, 5/5 bl UEs  Sensation intact  No clonus   Psychiatric - Mood stable, Affect WNL    RECENT LABS/IMAGING  CBC Full  -  ( 09 Mar 2025 07:09 )  WBC Count : 11.58 K/uL  RBC Count : 3.67 M/uL  Hemoglobin : 9.8 g/dL  Hematocrit : 33.3 %  Platelet Count - Automated : 186 K/uL  Mean Cell Volume : 90.7 fl  Mean Cell Hemoglobin : 26.7 pg  Mean Cell Hemoglobin Concentration : 29.4 g/dL  Auto Neutrophil # : x  Auto Lymphocyte # : x  Auto Monocyte # : x  Auto Eosinophil # : x  Auto Basophil # : x  Auto Neutrophil % : x  Auto Lymphocyte % : x  Auto Monocyte % : x  Auto Eosinophil % : x  Auto Basophil % : x    03-09    135  |  98  |  14  ----------------------------<  86  4.3   |  22  |  2.31[H]    Ca    7.1[L]      09 Mar 2025 07:09      Urinalysis Basic - ( 09 Mar 2025 07:09 )    Color: x / Appearance: x / SG: x / pH: x  Gluc: 86 mg/dL / Ketone: x  / Bili: x / Urobili: x   Blood: x / Protein: x / Nitrite: x   Leuk Esterase: x / RBC: x / WBC x   Sq Epi: x / Non Sq Epi: x / Bacteria: x    Impression:  73 yo with functional deficits secondary to diagnosis of debility, polio    Plan:  PT- ROM, Bed Mob, Transfers, Amb w AD and bracing as needed  OT- ADLs, bracing  Prec- Falls, Cardiac, Pulm  DVT Prophylaxis  Skin- Turn q2 h  Dispo- Acute Rehab- patient requires active and ongoing therapeutic interventions of multiple disciplines and can tolerate and benefit from 3 hours of intensive therapies x 2-4wks depending on progress at rehabilitation facility. Can actively participate and benefit from  an intensive rehabilitation program. Requires supervision by a rehabilitation physician and a coordinated interdisciplinary approach to providing rehabilitation.     Total time taken to review relevant records and imaging results, examine patient, write note, and, when applicable, discuss case with patient, family, , resident, medical student and other medical providers:     [  ] 40 minutes (25551)  [X] 55 minutes (56915)  [  ] 75 minutes (49785)    [  ] 25 minutes (00436)  [  ] 35 minutes (46112)  [  ] 50 minutes (85437)           Patient is a 74y old  Male who presents with a chief complaint of ESRD requiring HD, uremia (10 Mar 2025 11:47)    Admission HPI:  74 year-old man with history of multiple medical issues including polio with associated neurogenic bladder/suprapubic catheter, iatrogenic rectal rupture requiring colostomy 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 He is well known to me from multiple recent admisions  s/p admissions at [facility] 12/20-12/25/24 and 2/4-2/7 with SONDRA on CKD with associated uremic symptoms.   At the last admission he had expressed strong interest to try to hold off with HD intiation but he has been getting worse clinically at home.  His SONDRA and uremic symptoms significantly improved after the first admission; they improved a to mild extent during the 2nd admission to the point where he was able to be discharged without HD. Since then, however, he has worsened further.   He has been suffering from progressively worsening diffuse pruritus, loss of appetite, and generalized weakness and falls.   His nephrologist and PCP has been speaking with him and his family over the past few days,   The initial plan was that he would present to the [facility] ER Monday 2/17 (ie tomorrow) for HD initiation. Today, however, he fell and sustained trauma to his knee. Given the fall and concern for knee fracture, he presented to the ER today. multiple xrays show no Fractures. (16 Feb 2025 19:41)    Interval History:  Patient has had a very complicated hospital course - with confusion - evaluated by neurology - multifactorial - due to infection, metabolic changes, delirium.  Course also c/b SONDRA.   Patient s/p iliac stent on 3/3/25.    REVIEW OF SYSTEMS: + paraplegia, + mcconnell, No chest pain, shortness of breath, nausea, vomiting or diarhea; other ROS neg     PAST MEDICAL & SURGICAL HISTORY  Polio    Diabetes    Pulmonary hypertension    Hypertension    H/O urinary retention    HLD (hyperlipidemia)    BPH with obstruction/lower urinary tract symptoms    Type 2 diabetes mellitus    Erectile dysfunction    Bladder outlet obstruction    Presence of suprapubic catheter    Colostomy status    DVT, lower extremity    H/O cardiac murmur    No significant past surgical history    S/P colostomy    Suprapubic catheter    H/O total hip arthroplasty, right    Presence of internal fixation gianluca in upper extremity    S/P ORIF (open reduction internal fixation) fracture    S/P cataract surgery    H/O shoulder surgery    FUNCTIONAL HISTORY:   Lives w wife - uses a power w/c. Wife assists with transfers and ADLs    CURRENT FUNCTIONAL STATUS:  Max A transfers    FAMILY HISTORY   FH: type 2 diabetes    MEDICATIONS   acetaminophen     Tablet .. 650 milliGRAM(s) Oral every 6 hours PRN  albuterol/ipratropium for Nebulization 3 milliLiter(s) Nebulizer every 6 hours  aspirin  chewable 81 milliGRAM(s) Oral daily  atorvastatin 40 milliGRAM(s) Oral at bedtime  bisacodyl 5 milliGRAM(s) Oral every 12 hours PRN  chlorhexidine 2% Cloths 1 Application(s) Topical daily  clopidogrel Tablet 75 milliGRAM(s) Oral daily  epoetin aleah-epbx (RETACRIT) Injectable 84638 Unit(s) IV Push <User Schedule>  guaiFENesin ER 1200 milliGRAM(s) Oral every 12 hours  HYDROmorphone  Injectable 2 milliGRAM(s) IV Push every 6 hours PRN  hydrOXYzine hydrochloride 25 milliGRAM(s) Oral three times a day  ibuprofen  Tablet. 400 milliGRAM(s) Oral every 12 hours  oxyCODONE    IR 10 milliGRAM(s) Oral every 6 hours PRN  oxyCODONE  ER Tablet 30 milliGRAM(s) Oral every 12 hours  pantoprazole    Tablet 40 milliGRAM(s) Oral before breakfast  polyethylene glycol 3350 17 Gram(s) Oral daily  senna 2 Tablet(s) Oral at bedtime  sodium chloride 0.9% lock flush 10 milliLiter(s) IV Push every 1 hour PRN    ALLERGIES  No Known Allergies    VITALS  T(C): 36.2 (03-10-25 @ 10:45), Max: 36.8 (03-09-25 @ 16:25)  HR: 78 (03-10-25 @ 10:45) (77 - 85)  BP: 125/53 (03-10-25 @ 10:45) (99/64 - 153/79)  RR: 18 (03-10-25 @ 10:45) (18 - 18)  SpO2: 98% (03-10-25 @ 10:45) (98% - 98%)  Wt(kg): --    PHYSICAL EXAM  Constitutional - NAD, Comfortable  HEENT - NCAT, EOMI  Neck - Supple  Chest - No distress, no use of accessory muscles for respiration  Cardiovascular -Well perfused  Abdomen - BS+, Soft, NTND  Extremities - distal LEs with eccymoses, No calf tenderness   Neurologic Exam -                 AAO x 3  Motor 0/5 bl LEs, 5/5 bl UEs  Sensation intact  No clonus   Psychiatric - Mood stable, Affect WNL    RECENT LABS/IMAGING  CBC Full  -  ( 09 Mar 2025 07:09 )  WBC Count : 11.58 K/uL  RBC Count : 3.67 M/uL  Hemoglobin : 9.8 g/dL  Hematocrit : 33.3 %  Platelet Count - Automated : 186 K/uL  Mean Cell Volume : 90.7 fl  Mean Cell Hemoglobin : 26.7 pg  Mean Cell Hemoglobin Concentration : 29.4 g/dL  Auto Neutrophil # : x  Auto Lymphocyte # : x  Auto Monocyte # : x  Auto Eosinophil # : x  Auto Basophil # : x  Auto Neutrophil % : x  Auto Lymphocyte % : x  Auto Monocyte % : x  Auto Eosinophil % : x  Auto Basophil % : x    03-09    135  |  98  |  14  ----------------------------<  86  4.3   |  22  |  2.31[H]    Ca    7.1[L]      09 Mar 2025 07:09      Urinalysis Basic - ( 09 Mar 2025 07:09 )    Color: x / Appearance: x / SG: x / pH: x  Gluc: 86 mg/dL / Ketone: x  / Bili: x / Urobili: x   Blood: x / Protein: x / Nitrite: x   Leuk Esterase: x / RBC: x / WBC x   Sq Epi: x / Non Sq Epi: x / Bacteria: x    Impression:  73 yo with functional deficits secondary to diagnosis of debility, polio    Plan:  PT- ROM, Bed Mob, Transfers, Amb w AD and bracing as needed  OT- ADLs, bracing  Prec- Falls, Cardiac, Pulm  DVT Prophylaxis  Skin- Turn q2 h  Dispo- NYDIA- can not tolerate three hours of rehab per day and will require a longer length of rehabilitation stay.     Total time taken to review relevant records and imaging results, examine patient, write note, and, when applicable, discuss case with patient, family, , resident, medical student and other medical providers:     [  ] 40 minutes (01990)  [X] 55 minutes (39772)  [  ] 75 minutes (07810)    [  ] 25 minutes (33988)  [  ] 35 minutes (77549)  [  ] 50 minutes (78717)

## 2025-03-10 NOTE — PROGRESS NOTE ADULT - SUBJECTIVE AND OBJECTIVE BOX
Patient is a 74y old  Male who presents with a chief complaint of ESRD requiring HD, uremia (10 Mar 2025 14:02)      SUBJECTIVE / OVERNIGHT EVENTS: ptn is awake, alert, wife at bedside, i instructed them to give rehab choices. also awaiting SPC change to be done by urology. pain is controlled    MEDICATIONS  (STANDING):  albuterol/ipratropium for Nebulization 3 milliLiter(s) Nebulizer every 6 hours  aspirin  chewable 81 milliGRAM(s) Oral daily  atorvastatin 40 milliGRAM(s) Oral at bedtime  chlorhexidine 2% Cloths 1 Application(s) Topical daily  clopidogrel Tablet 75 milliGRAM(s) Oral daily  epoetin aleah-epbx (RETACRIT) Injectable 51908 Unit(s) IV Push <User Schedule>  guaiFENesin ER 1200 milliGRAM(s) Oral every 12 hours  hydrOXYzine hydrochloride 25 milliGRAM(s) Oral three times a day  ibuprofen  Tablet. 400 milliGRAM(s) Oral every 12 hours  oxyCODONE  ER Tablet 30 milliGRAM(s) Oral every 12 hours  pantoprazole    Tablet 40 milliGRAM(s) Oral before breakfast  polyethylene glycol 3350 17 Gram(s) Oral daily  senna 2 Tablet(s) Oral at bedtime    MEDICATIONS  (PRN):  acetaminophen     Tablet .. 650 milliGRAM(s) Oral every 6 hours PRN Temp greater or equal to 38C (100.4F), Mild Pain (1 - 3)  bisacodyl 5 milliGRAM(s) Oral every 12 hours PRN Constipation  HYDROmorphone  Injectable 2 milliGRAM(s) IV Push every 6 hours PRN Severe Pain (7 - 10)  oxyCODONE    IR 10 milliGRAM(s) Oral every 6 hours PRN Moderate Pain (4 - 6)  sodium chloride 0.9% lock flush 10 milliLiter(s) IV Push every 1 hour PRN Pre/post blood products, medications, blood draw, and to maintain line patency      Vital Signs Last 24 Hrs  T(F): 97.2 (03-10-25 @ 10:45), Max: 98.3 (03-09-25 @ 16:25)  HR: 78 (03-10-25 @ 10:45) (77 - 85)  BP: 125/53 (03-10-25 @ 10:45) (99/64 - 153/79)  RR: 18 (03-10-25 @ 10:45) (18 - 18)  SpO2: 98% (03-10-25 @ 10:45) (98% - 98%)  Telemetry:   CAPILLARY BLOOD GLUCOSE        I&O's Summary    09 Mar 2025 07:01  -  10 Mar 2025 07:00  --------------------------------------------------------  IN: 0 mL / OUT: 600 mL / NET: -600 mL    10 Mar 2025 07:01  -  10 Mar 2025 14:29  --------------------------------------------------------  IN: 0 mL / OUT: 0 mL / NET: 0 mL        PHYSICAL EXAM:  GENERAL: NAD, well-developed  HEAD:  Atraumatic, Normocephalic  EYES: EOMI, PERRLA, conjunctiva and sclera clear  NECK: Supple, No JVD  CHEST/LUNG: Clear to auscultation bilaterally; No wheeze  HEART: Regular rate and rhythm; No murmurs, rubs, or gallops  ABDOMEN: Soft, Nontender, Nondistended; Bowel sounds present  EXTREMITIES:  2+ Peripheral Pulses, No clubbing, cyanosis, or edema  PSYCH: AAOx3  NEUROLOGY: non-focal  SKIN: No rashes or lesions    LABS:                        9.8    11.58 )-----------( 186      ( 09 Mar 2025 07:09 )             33.3     03-09    135  |  98  |  14  ----------------------------<  86  4.3   |  22  |  2.31[H]    Ca    7.1[L]      09 Mar 2025 07:09            Urinalysis Basic - ( 09 Mar 2025 07:09 )    Color: x / Appearance: x / SG: x / pH: x  Gluc: 86 mg/dL / Ketone: x  / Bili: x / Urobili: x   Blood: x / Protein: x / Nitrite: x   Leuk Esterase: x / RBC: x / WBC x   Sq Epi: x / Non Sq Epi: x / Bacteria: x        RADIOLOGY & ADDITIONAL TESTS:    Imaging Personally Reviewed:    Consultant(s) Notes Reviewed:      Care Discussed with Consultants/Other Providers:

## 2025-03-10 NOTE — PROGRESS NOTE ADULT - PROBLEM SELECTOR PLAN 1
-CXR with elevated R hemidiaphragm (not present on CXR in December 2024)  -Low grade temp, cough  -CT chest with LLL, RLL GGO, mucoid impacted airways. No clear PNA. Monitoring off ABX per ID  -Suggest Duoneb q6h, Mucinex 1200 mg PO BID   -S/p Mucomyst x5 days   -Incentive spirometry   -Keep sats >90% with o2 PRN  -ABG 2/28 with no co2 retention  -CXR 3/1 with improved R hemidiaphragm, low lung volumes on L. Otherwise grossly clear.  3/9: seems pretty good toay  :   no sob:  on room air:  pain is controlled:  encouraged IS  /: pulm wise he looks pretty good:   no sob:   no cugh :   no phlegm

## 2025-03-10 NOTE — PROGRESS NOTE ADULT - SUBJECTIVE AND OBJECTIVE BOX
Neurology      S; patient seen. no neuro changes           Medications: MEDICATIONS  (STANDING):  albuterol/ipratropium for Nebulization 3 milliLiter(s) Nebulizer every 6 hours  aspirin  chewable 81 milliGRAM(s) Oral daily  atorvastatin 40 milliGRAM(s) Oral at bedtime  chlorhexidine 2% Cloths 1 Application(s) Topical daily  clopidogrel Tablet 75 milliGRAM(s) Oral daily  epoetin aleah-epbx (RETACRIT) Injectable 79895 Unit(s) IV Push <User Schedule>  guaiFENesin ER 1200 milliGRAM(s) Oral every 12 hours  hydrOXYzine hydrochloride 25 milliGRAM(s) Oral three times a day  ibuprofen  Tablet. 400 milliGRAM(s) Oral every 12 hours  oxyCODONE  ER Tablet 30 milliGRAM(s) Oral every 12 hours  pantoprazole    Tablet 40 milliGRAM(s) Oral before breakfast  polyethylene glycol 3350 17 Gram(s) Oral daily  senna 2 Tablet(s) Oral at bedtime    MEDICATIONS  (PRN):  acetaminophen     Tablet .. 650 milliGRAM(s) Oral every 6 hours PRN Temp greater or equal to 38C (100.4F), Mild Pain (1 - 3)  bisacodyl 5 milliGRAM(s) Oral every 12 hours PRN Constipation  HYDROmorphone  Injectable 2 milliGRAM(s) IV Push every 6 hours PRN Severe Pain (7 - 10)  oxyCODONE    IR 10 milliGRAM(s) Oral every 6 hours PRN Moderate Pain (4 - 6)  sodium chloride 0.9% lock flush 10 milliLiter(s) IV Push every 1 hour PRN Pre/post blood products, medications, blood draw, and to maintain line patency       Vitals:  Vital Signs Last 24 Hrs  T(C): 36.2 (10 Mar 2025 07:45), Max: 36.8 (09 Mar 2025 16:25)  T(F): 97.2 (10 Mar 2025 07:45), Max: 98.3 (09 Mar 2025 16:25)  HR: 79 (10 Mar 2025 07:45) (77 - 85)  BP: 117/62 (10 Mar 2025 07:45) (99/64 - 153/79)  BP(mean): --  RR: 18 (10 Mar 2025 07:45) (18 - 18)  SpO2: 98% (10 Mar 2025 07:45) (98% - 98%)    Parameters below as of 10 Mar 2025 07:45  Patient On (Oxygen Delivery Method): room air                      General Appearance: Appropriately dressed and in no acute distress       Head: Normocephalic, atraumatic and no dysmorphic features  Ear, Nose, and Throat: Moist mucous membranes  CVS: S1S2+  Resp: No SOB, no wheeze or rhonchi  GI: soft NT/ND  Extremities: LE PVD : dusky toes noted   Skin: + decub      Neurological Exam:  Mental Status: Awake, alert and oriented x 2.  Able to follow simple and complex verbal commands. Able to name and repeat. fluent speech. No obvious aphasia or dysarthria noted.   Cranial Nerves: PERRL, EOMI, VFFC, sensation V1-V3 intact,  no obvious facial asymmetry, equal elevation of palate, scm/trap 5/5, tongue is midline on protrusion. no obvious papilledema on fundoscopic exam. hearing is grossly intact.   Motor: Normal bulk, tone and strength throughout uppers 4/ lowers 0-/1 5   Sensation: Intact to light touch and pinprick throughout.     Coordination: No dysmetria on FNF   Gait: deferred, wheelchair bound     Data/Labs/Imaging which I personally reviewed.       LABS:                          9.8    11.58 )-----------( 186      ( 09 Mar 2025 07:09 )             33.3     03-09    135  |  98  |  14  ----------------------------<  86  4.3   |  22  |  2.31[H]    Ca    7.1[L]      09 Mar 2025 07:09          Urinalysis Basic - ( 09 Mar 2025 07:09 )    Color: x / Appearance: x / SG: x / pH: x  Gluc: 86 mg/dL / Ketone: x  / Bili: x / Urobili: x   Blood: x / Protein: x / Nitrite: x   Leuk Esterase: x / RBC: x / WBC x   Sq Epi: x / Non Sq Epi: x / Bacteria: x                < from: CT Head No Cont (02.20.25 @ 18:47) >    ACC: [id number] EXAM:  CT BRAIN   ORDERED BY: GUY DE LA CRUZ     PROCEDURE DATE:  02/20/2025          INTERPRETATION:  CLINICAL INDICATION: Altered mental status    TECHNIQUE: Axial CT scanning of the brain was obtained from the skull   base to the vertex without the administration of intravenous contrast.   Reformatted coronal and sagittal images were subsequently obtained and   reviewed.    COMPARISON: None    FINDINGS:  There is no CT evidence of acute transcortical infarct. Age-related   involutional changes and chronic microvascular ischemic changes.    There is no hydrocephalus, mass effect, or acute intracranial hemorrhage.   No extra-axial collection. Basal cisterns are patent.    The visualized paranasal sinuses and mastoid air cells are clear.    The calvarium is intact.    IMPRESSION:  No evidence of acute transcortical infarct, acute intracranial   hemorrhage, or mass effect.    --- End of Report ---            CORTNEY REARDON MD; Attending Radiologist  This document has been electronically signed. Feb 20 2025  7:07PM    < end of copied text >

## 2025-03-11 NOTE — PROGRESS NOTE ADULT - SUBJECTIVE AND OBJECTIVE BOX
Subjective: Patient seen and examined. No new events except as noted.   Wife at bedside  feels ok     REVIEW OF SYSTEMS:    CONSTITUTIONAL: +weakness, fevers or chills  EYES/ENT: No visual changes;  No vertigo or throat pain   NECK: No pain or stiffness  RESPIRATORY: No cough, wheezing, hemoptysis; No shortness of breath  CARDIOVASCULAR: No chest pain or palpitations  GASTROINTESTINAL: No abdominal or epigastric pain. No nausea, vomiting, or hematemesis; No diarrhea or constipation. No melena or hematochezia.  GENITOURINARY: No dysuria, frequency or hematuria  NEUROLOGICAL: No numbness or weakness  SKIN: No itching, burning, rashes, or lesions   All other review of systems is negative unless indicated above.    MEDICATIONS:  MEDICATIONS  (STANDING):  albuterol/ipratropium for Nebulization 3 milliLiter(s) Nebulizer every 6 hours  aspirin  chewable 81 milliGRAM(s) Oral daily  atorvastatin 40 milliGRAM(s) Oral at bedtime  chlorhexidine 2% Cloths 1 Application(s) Topical daily  clopidogrel Tablet 75 milliGRAM(s) Oral daily  epoetin aleah-epbx (RETACRIT) Injectable 87698 Unit(s) IV Push <User Schedule>  guaiFENesin ER 1200 milliGRAM(s) Oral every 12 hours  hydrOXYzine hydrochloride 25 milliGRAM(s) Oral three times a day  ibuprofen  Tablet. 400 milliGRAM(s) Oral every 12 hours  oxyCODONE  ER Tablet 30 milliGRAM(s) Oral every 12 hours  pantoprazole    Tablet 40 milliGRAM(s) Oral before breakfast  polyethylene glycol 3350 17 Gram(s) Oral daily  senna 2 Tablet(s) Oral at bedtime      PHYSICAL EXAM:  T(C): 36.8 (03-11-25 @ 00:25), Max: 36.8 (03-10-25 @ 16:18)  HR: 81 (03-11-25 @ 00:25) (78 - 83)  BP: 159/83 (03-11-25 @ 00:25) (110/53 - 159/83)  RR: 18 (03-11-25 @ 00:25) (18 - 18)  SpO2: 99% (03-11-25 @ 00:25) (98% - 99%)  Wt(kg): --  I&O's Summary    10 Mar 2025 07:01  -  11 Mar 2025 07:00  --------------------------------------------------------  IN: 0 mL / OUT: 0 mL / NET: 0 mL          Appearance: NAD  HEENT:  Dry oral mucosa, PERRL, EOMI	  Lymphatic: No lymphadenopathy  Cardiovascular: Normal S1 S2, No JVD, No murmurs, No edema  Respiratory: Lungs clear to auscultation	  Psychiatry: A & O x 3, Mood & affect appropriate  Skin: No rashes, No ecchymoses, No cyanosis	  Neurologic: Non-focal  GI/Abd: Soft, obese, nontender. Dressing to Q C/D/I. Suprapubic catheter in place  Ext: Palp femoral pulses bilat. BLE atrophic, minimal dorsi/plantarflexion bilaterally. Sensation grossly intact. LLE edematous compared to R, erythema to right toes/forefoot. mottling of right toes/forefoot/heel  LLE:  small superficial abrasion, +edema and +ecchymosis over L knee  +TTP over L knee, no TTP along remainder of extremity; compartments soft  Limited ROM at knee 2/2 pain  No gross varus/valgus laxity, but assessment limited 2/2 pain  Motor: TA/EHL/GS/FHL intact  Sensory: DP/SP/Tib/Jordan/Saph SILT  +DP pulse (symmetric relative to contralateral side), WWP   pressure wound from the LLE immobilizer posteriorly proximal to the knee.  Vascular: Peripheral pulses palpable 2+ bilaterally        LABS:    CARDIAC MARKERS:                  proBNP:   Lipid Profile:   HgA1c:   TSH:             TELEMETRY: 	    ECG:  	  RADIOLOGY:   DIAGNOSTIC TESTING:  [ ] Echocardiogram:  [ ]  Catheterization:  [ ] Stress Test:    OTHER:

## 2025-03-11 NOTE — PROGRESS NOTE ADULT - PROBLEM SELECTOR PLAN 3
-Neuro following  -ABG 2/20 acceptable  -Pt lethargic 2/22, ABG never sent but AMS appears to be improving   -ABG 2/28 with no co2 retention.    3/9: he seems pretty alert and awake and responsive appropriately to questions  /: resolved:   he is alert sand aawke and responsive to questions well : needs pt ot    3/11 : seems to be doing  ok : no sob:  on room air

## 2025-03-11 NOTE — PROGRESS NOTE ADULT - SUBJECTIVE AND OBJECTIVE BOX
Patient is a 74y old  Male who presents with a chief complaint of ESRD requiring HD, uremia (11 Mar 2025 17:53)      SUBJECTIVE / OVERNIGHT EVENTS: ptn has no new c/o other than feeling constipated, lactulose ordered. also in preparation for NYDIA will dc prn IV DIlaudid, cont prn OXy IR    MEDICATIONS  (STANDING):  albuterol/ipratropium for Nebulization 3 milliLiter(s) Nebulizer every 6 hours  aspirin  chewable 81 milliGRAM(s) Oral daily  atorvastatin 40 milliGRAM(s) Oral at bedtime  chlorhexidine 2% Cloths 1 Application(s) Topical daily  clopidogrel Tablet 75 milliGRAM(s) Oral daily  epoetin aleah-epbx (RETACRIT) Injectable 18445 Unit(s) IV Push <User Schedule>  guaiFENesin ER 1200 milliGRAM(s) Oral every 12 hours  hydrOXYzine hydrochloride 25 milliGRAM(s) Oral three times a day  ibuprofen  Tablet. 400 milliGRAM(s) Oral every 12 hours  oxyCODONE  ER Tablet 30 milliGRAM(s) Oral every 12 hours  pantoprazole    Tablet 40 milliGRAM(s) Oral before breakfast  polyethylene glycol 3350 17 Gram(s) Oral daily  senna 2 Tablet(s) Oral at bedtime    MEDICATIONS  (PRN):  acetaminophen     Tablet .. 650 milliGRAM(s) Oral every 6 hours PRN Temp greater or equal to 38C (100.4F), Mild Pain (1 - 3)  bisacodyl 5 milliGRAM(s) Oral every 12 hours PRN Constipation  oxyCODONE    IR 10 milliGRAM(s) Oral every 4 hours PRN Moderate Pain (4 - 6)  sodium chloride 0.9% lock flush 10 milliLiter(s) IV Push every 1 hour PRN Pre/post blood products, medications, blood draw, and to maintain line patency      Vital Signs Last 24 Hrs  T(F): 98.7 (03-11-25 @ 15:42), Max: 98.7 (03-11-25 @ 15:42)  HR: 90 (03-11-25 @ 15:42) (81 - 90)  BP: 112/74 (03-11-25 @ 15:42) (112/74 - 159/83)  RR: 17 (03-11-25 @ 15:42) (17 - 18)  SpO2: 96% (03-11-25 @ 15:42) (96% - 99%)  Telemetry:   CAPILLARY BLOOD GLUCOSE        I&O's Summary    10 Mar 2025 07:01  -  11 Mar 2025 07:00  --------------------------------------------------------  IN: 0 mL / OUT: 0 mL / NET: 0 mL        PHYSICAL EXAM:  GENERAL: NAD, well-developed  HEAD:  Atraumatic, Normocephalic  EYES: EOMI, PERRLA, conjunctiva and sclera clear  NECK: Supple, No JVD  CHEST/LUNG: Clear to auscultation bilaterally; No wheeze  HEART: Regular rate and rhythm; No murmurs, rubs, or gallops  ABDOMEN: Soft, Nontender, Nondistended; Bowel sounds present  EXTREMITIES:  2+ Peripheral Pulses, No clubbing, cyanosis, or edema  PSYCH: AAOx3  NEUROLOGY: non-focal  SKIN: No rashes or lesions    LABS:                        8.9    12.90 )-----------( 268      ( 11 Mar 2025 09:50 )             30.4     03-11    136  |  101  |  15  ----------------------------<  104[H]  3.9   |  26  |  2.78[H]    Ca    7.3[L]      11 Mar 2025 09:50            Urinalysis Basic - ( 11 Mar 2025 09:50 )    Color: x / Appearance: x / SG: x / pH: x  Gluc: 104 mg/dL / Ketone: x  / Bili: x / Urobili: x   Blood: x / Protein: x / Nitrite: x   Leuk Esterase: x / RBC: x / WBC x   Sq Epi: x / Non Sq Epi: x / Bacteria: x        RADIOLOGY & ADDITIONAL TESTS:    Imaging Personally Reviewed:    Consultant(s) Notes Reviewed:      Care Discussed with Consultants/Other Providers:

## 2025-03-11 NOTE — PROGRESS NOTE ADULT - PROBLEM SELECTOR PLAN 1
-CXR with elevated R hemidiaphragm (not present on CXR in December 2024)  -Low grade temp, cough  -CT chest with LLL, RLL GGO, mucoid impacted airways. No clear PNA. Monitoring off ABX per ID  -Suggest Duoneb q6h, Mucinex 1200 mg PO BID   -S/p Mucomyst x5 days   -Incentive spirometry   -Keep sats >90% with o2 PRN  -ABG 2/28 with no co2 retention  -CXR 3/1 with improved R hemidiaphragm, low lung volumes on L. Otherwise grossly clear.  3/9: seems pretty good toay  :   no sob:  on room air:  pain is controlled:  encouraged IS  /: pulm wise he looks pretty good:   no sob:   no cugh :   no phlegm  3/11: seems to be doing  ok : n o sob: on room air:   no  new resp issues;

## 2025-03-11 NOTE — PROGRESS NOTE ADULT - ASSESSMENT
74 year old male with CKD, sp polio, paraplegia with associated neurogenic bladder s/p suprapubic catheter who was admitted s/p fall on 2/16.   CKD - ESRD, now s/p DEYA [place] 2/17, on HD  ruled out cellulitis around suprapubic cath  2/20 s/p RRT for tremors and confusion -- pt with chronic tremors although notably worse  hyponatremia    mrsa negative   2/20 CXR with clear lungs   SPC site with chronic/stable inflammatory changes, no drainage - no sign of SSTI  CTA abd with occlusion of b/l external iliac arteries and b/l superficial femoral arteries with distal reconstitution at popliteal arteries; patent three vessel runoff of RLE with 2 vessel runoff of LLE with occlusion of L mid anterior tibial artery with distal reconstitution  Bcx negative to date   mental status at baseline  WBC noted post op-stable overall, remains afebrile   Vascular following for worsening PAD with worsening ischemic changes   -3/3 s/p RLE angiogram b/l iliac artery stents     s/p vancomycin x1 2/20  s/p zosyn 2/20-2/22      Recommendations:   Continue off antibiotics   Vascular surgery following  HD per renal    Monitor temps/WBC  Continue rest of care per primary team   Stable from ID standpoint at this time.     Bridgette Ramirez M.D.  [facility]  Available on [employer] TEAMS - *PREFERRED*  [phone]  After 5pm on weekdays and all day on weekends - please call [phone]     Thank you for consulting us and involving us in the management of this patients case. In addition to reviewing history, imaging, documents, labs, microbiology, took into account antibiotic stewardship, local antibiogram and infection control strategies and potential transmission issues at time of treatment decision making process.

## 2025-03-11 NOTE — PROGRESS NOTE ADULT - PROBLEM SELECTOR PLAN 6
-Monitor leukocytosis/fever curve  -CXR as above, no clear infiltrate  -ID f/u.  3/9: WBC slowly downtrending  3/10: con tto downtrend  3/11: wbc is slightly high o fver;

## 2025-03-11 NOTE — PROGRESS NOTE ADULT - SUBJECTIVE AND OBJECTIVE BOX
Date of Service: 03-11-25 @ 15:51    Patient is a 74y old  Male who presents with a chief complaint of ESRD requiring HD, uremia (11 Mar 2025 09:28)      Any change in ROS: seems to be doign ok :  no sob;  shaking hands:       MEDICATIONS  (STANDING):  albuterol/ipratropium for Nebulization 3 milliLiter(s) Nebulizer every 6 hours  aspirin  chewable 81 milliGRAM(s) Oral daily  atorvastatin 40 milliGRAM(s) Oral at bedtime  chlorhexidine 2% Cloths 1 Application(s) Topical daily  clopidogrel Tablet 75 milliGRAM(s) Oral daily  epoetin aleah-epbx (RETACRIT) Injectable 15916 Unit(s) IV Push <User Schedule>  guaiFENesin ER 1200 milliGRAM(s) Oral every 12 hours  hydrOXYzine hydrochloride 25 milliGRAM(s) Oral three times a day  ibuprofen  Tablet. 400 milliGRAM(s) Oral every 12 hours  oxyCODONE  ER Tablet 30 milliGRAM(s) Oral every 12 hours  pantoprazole    Tablet 40 milliGRAM(s) Oral before breakfast  polyethylene glycol 3350 17 Gram(s) Oral daily  senna 2 Tablet(s) Oral at bedtime    MEDICATIONS  (PRN):  acetaminophen     Tablet .. 650 milliGRAM(s) Oral every 6 hours PRN Temp greater or equal to 38C (100.4F), Mild Pain (1 - 3)  bisacodyl 5 milliGRAM(s) Oral every 12 hours PRN Constipation  oxyCODONE    IR 10 milliGRAM(s) Oral every 4 hours PRN Moderate Pain (4 - 6)  sodium chloride 0.9% lock flush 10 milliLiter(s) IV Push every 1 hour PRN Pre/post blood products, medications, blood draw, and to maintain line patency    Vital Signs Last 24 Hrs  T(C): 37.1 (11 Mar 2025 15:42), Max: 37.1 (11 Mar 2025 15:42)  T(F): 98.7 (11 Mar 2025 15:42), Max: 98.7 (11 Mar 2025 15:42)  HR: 90 (11 Mar 2025 15:42) (81 - 90)  BP: 112/74 (11 Mar 2025 15:42) (110/53 - 159/83)  BP(mean): --  RR: 17 (11 Mar 2025 15:42) (17 - 18)  SpO2: 96% (11 Mar 2025 15:42) (96% - 99%)    Parameters below as of 11 Mar 2025 15:42  Patient On (Oxygen Delivery Method): room air        I&O's Summary    10 Mar 2025 07:01  -  11 Mar 2025 07:00  --------------------------------------------------------  IN: 0 mL / OUT: 0 mL / NET: 0 mL          Physical Exam:   GENERAL: NAD, well-groomed, well-developed  HEENT: VINNY/   Atraumatic, Normocephalic  ENMT: No tonsillar erythema, exudates, or enlargement; Moist mucous membranes, Good dentition, No lesions  NECK: Supple, No JVD, Normal thyroid  CHEST/LUNG: Clear to auscultaion  CVS: Regular rate and rhythm; No murmurs, rubs, or gallops  GI: : Soft, Nontender, Nondistended; Bowel sounds present  NERVOUS SYSTEM:  Alert & Oriented X3  EXTREMITIES:  - edema  LYMPH: No lymphadenopathy noted  SKIN: No rashes or lesions  ENDOCRINOLOGY: No Thyromegaly  PSYCH: Appropriate    Labs:                              8.9    12.90 )-----------( 268      ( 11 Mar 2025 09:50 )             30.4                         9.8    11.58 )-----------( 186      ( 09 Mar 2025 07:09 )             33.3                         7.5    12.94 )-----------( 204      ( 08 Mar 2025 09:28 )             24.5     03-11    136  |  101  |  15  ----------------------------<  104[H]  3.9   |  26  |  2.78[H]  03-09    135  |  98  |  14  ----------------------------<  86  4.3   |  22  |  2.31[H]    Ca    7.3[L]      11 Mar 2025 09:50      CAPILLARY BLOOD GLUCOSE              Urinalysis Basic - ( 11 Mar 2025 09:50 )    Color: x / Appearance: x / SG: x / pH: x  Gluc: 104 mg/dL / Ketone: x  / Bili: x / Urobili: x   Blood: x / Protein: x / Nitrite: x   Leuk Esterase: x / RBC: x / WBC x   Sq Epi: x / Non Sq Epi: x / Bacteria: x        rad< from: Xray Chest 1 View- PORTABLE-Urgent (Xray Chest 1 View- PORTABLE-Urgent .) (03.01.25 @ 22:48) >    ACC: [id number] EXAM:  XR CHEST PORTABLE URGENT 1V   ORDERED BY: DELPHINE BARNETT     PROCEDURE DATE:  03/01/2025          INTERPRETATION:  EXAMINATION: XR CHEST URGENT    CLINICAL INDICATION: cough    TECHNIQUE: Single frontal, portable view of the chest was obtained.    COMPARISON: Chest x-ray 2/20/2025.    FINDINGS:  Right-sided tunneled hemodialysis catheter terminates in the SVC.  The heart is not accurately assessed in this AP projection.  The lungs are clear.  There is no pneumothorax or pleural effusion.  No acute bony abnormality.    IMPRESSION:  Clear lungs.    --- End of Report ---          GILBERT LEON MD; Resident Radiologist  This document has been electronically signed.   JESUS HALEY DO; Attending Interventional Radiologist  This document has been electronically signed. Mar  2 2025  9:37AM    < end of copied text >      RECENT CULTURES:        RESPIRATORY CULTURES:          Studies  Chest X-RAY  CT SCAN Chest   Venous Dopplers: LE:   CT Abdomen  Others

## 2025-03-11 NOTE — PROGRESS NOTE ADULT - PROBLEM SELECTOR PROBLEM 4
"Clinic Care Coordination Contact  Community Health Worker Initial Outreach    CHW Initial Information Gathering:  Referral Source: PCP  Current living arrangement:: I live in a private home with spouse (lives with boyfriend)  Type of residence:: Private home - stairs  Community Resources: None  Supplies Currently Used at Home: Diabetic Supplies  Equipment Currently Used at Home: none  Informal Support system:: Spouse  No PCP office visit in Past Year: No  Transportation means:: Regular car       Patient accepts CC: Yes. Patient scheduled for assessment with CC SW on 11/9/2023 at 2:00 PM. Patient noted desire to discuss Mental Health support, obtaining CADI and ARHMS worker since moving to [place] (used to have these in [place]).Patient moved to [place] about 1 year ago.    CHW was able to connect with the Patient and introduce self/care coordination and intent of call.     In addition to what's listed above, Patient notes that her PCP thinks it would be beneficial to talk with \"Candy.\" Patient believes it is a dietician at the Clinic but is not sure of their last name. Per Chart review, there is a diabetes education referral placed. Writer informed that this potentially what PCP was referencing as Diabetes Ed helps with nutrition as well; Patient voiced understanding.     Patient had no additional questions or concerns during the time of call.     Melissa TELLEZ [facility]  Community Health Worker  [facility] Clinics:  [facility] & [place]   Clinic Care Coordination  [phone]       " Hyperlipidemia

## 2025-03-11 NOTE — PROGRESS NOTE ADULT - SUBJECTIVE AND OBJECTIVE BOX
Overnight events noted      VITAL:  T(C): , Max: 36.8 (03-10-25 @ 16:18)  T(F): , Max: 98.3 (03-10-25 @ 16:18)  HR: 81 (03-11-25 @ 00:25)  BP: 159/83 (03-11-25 @ 00:25)  BP(mean): --  RR: 18 (03-11-25 @ 00:25)  SpO2: 99% (03-11-25 @ 00:25)  Wt(kg): --      PHYSICAL EXAM:  Constitutional: alert, NAD  HEENT: NCAT, DMM  Neck: Supple, No JVD  Respiratory: CTA-b/l  Cardiovascular: RRR s1s2, no m/r/g  Gastrointestinal: BS+, soft, NT/ND  : (+)suprapubic cath  Extremities: No peripheral edema b/l  Neurological: reduced generalized strength  Back: no CVAT b/l  Skin: (+)cyanotic changes b/l feet  Access: RIJ tunneled cath-accessed    LABS:                        9.8    11.58 )-----------( 186      ( 09 Mar 2025 07:09 )             33.3     Na(135)/K(4.3)/Cl(98)/HCO3(22)/BUN(14)/Cr(2.31)Glu(86)/Ca(7.1)/Mg(--)/PO4(--)    03-09 @ 07:09      IMPRESSION: 74M w/ polio, neurogenic bladder/suprapubic catheter, 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 Last dialyzed Saturday 3/8; we can plan for HD today to get him back onto MWF schedule    (2)Hypophosphatemia - now off PO4 binders and off dietary PO4 restriction    (3)Anemia - s/p IV iron; on Retacrit with HD    (4)PAD - s/p LE bypass 3/3/25 - cyanotic changes of toes b/l - for further management after discharge    (5) - s/p suprapubic cath change 3/10/25      RECOMMEND:   (1)D/C planning per primary team  (2)Next HD tomorrow - inpatient vs outpatient  (      Rafita Denny MD  [facility]  Office/on call physician: [phone]  Cell (7a-7p): [phone]       Overnight events noted      VITAL:  T(C): , Max: 36.8 (03-10-25 @ 16:18)  T(F): , Max: 98.3 (03-10-25 @ 16:18)  HR: 81 (03-11-25 @ 00:25)  BP: 159/83 (03-11-25 @ 00:25)  BP(mean): --  RR: 18 (03-11-25 @ 00:25)  SpO2: 99% (03-11-25 @ 00:25)  Wt(kg): --      PHYSICAL EXAM:  Constitutional: alert, NAD  HEENT: NCAT, DMM  Neck: Supple, No JVD  Respiratory: CTA-b/l  Cardiovascular: RRR s1s2, no m/r/g  Gastrointestinal: BS+, soft, NT/ND  : (+)suprapubic cath  Extremities: No peripheral edema b/l  Neurological: reduced generalized strength  Back: no CVAT b/l  Skin: (+)cyanotic changes b/l feet  Access: RIJ tunneled cath    LABS:                        9.8    11.58 )-----------( 186      ( 09 Mar 2025 07:09 )             33.3     Na(135)/K(4.3)/Cl(98)/HCO3(22)/BUN(14)/Cr(2.31)Glu(86)/Ca(7.1)/Mg(--)/PO4(--)    03-09 @ 07:09      IMPRESSION: 74M w/ polio, neurogenic bladder/suprapubic catheter, 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 Last dialyzed Saturday 3/8; we can plan for HD today to get him back onto MWF schedule    (2)Hypophosphatemia - now off PO4 binders and off dietary PO4 restriction    (3)Anemia - s/p IV iron; on Retacrit with HD    (4)PAD - s/p LE bypass 3/3/25 - cyanotic changes of toes b/l - for further management after discharge    (5) - s/p suprapubic cath change 3/10/25      RECOMMEND:   (1)D/C planning per primary team  (2)Next HD tomorrow - inpatient vs outpatient        Rafita Denny MD  [facility]  Office/on call physician: [phone]  Cell (7a-7p): [phone]       complains of constipation  wife at bedside      VITAL:  T(C): , Max: 36.8 (03-10-25 @ 16:18)  T(F): , Max: 98.3 (03-10-25 @ 16:18)  HR: 81 (03-11-25 @ 00:25)  BP: 159/83 (03-11-25 @ 00:25)  BP(mean): --  RR: 18 (03-11-25 @ 00:25)  SpO2: 99% (03-11-25 @ 00:25)  Wt(kg): --      PHYSICAL EXAM:  Constitutional: alert, NAD  HEENT: NCAT, DMM  Neck: Supple, No JVD  Respiratory: CTA-b/l  Cardiovascular: RRR s1s2, no m/r/g  Gastrointestinal: BS+, soft, NT/ND  : (+)suprapubic cath  Extremities: No peripheral edema b/l  Neurological: reduced generalized strength  Back: no CVAT b/l  Skin: (+)cyanotic changes b/l feet  Access: RIJ tunneled cath    LABS:                        9.8    11.58 )-----------( 186      ( 09 Mar 2025 07:09 )             33.3     Na(135)/K(4.3)/Cl(98)/HCO3(22)/BUN(14)/Cr(2.31)Glu(86)/Ca(7.1)/Mg(--)/PO4(--)    03-09 @ 07:09      IMPRESSION: 74M w/ polio, neurogenic bladder/suprapubic catheter, 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 Last dialyzed Saturday 3/8; we can plan for HD today to get him back onto MWF schedule    (2)Hypophosphatemia - now off PO4 binders and off dietary PO4 restriction    (3)Anemia - s/p IV iron; on Retacrit with HD    (4)PAD - s/p LE bypass 3/3/25 - cyanotic changes of toes b/l - for further management after discharge    (5) - s/p suprapubic cath change 3/10/25    (6)Constipation    RECOMMEND:   (1)Bowel regimen per primary team  (2)Next HD tomorrow - inpatient vs outpatient        Rafita Denny MD  [facility]  Office/on call physician: [phone]  Cell (7a-7p): [phone]

## 2025-03-11 NOTE — PROGRESS NOTE ADULT - ASSESSMENT
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 The initial plan was that he would present to the [facility] ER Monday 2/17 for HD initiation. However, day of admission, he fell and sustained trauma to his knee. Called to consult for cough, elevated R hemidiaphragm.

## 2025-03-11 NOTE — PROGRESS NOTE ADULT - SUBJECTIVE AND OBJECTIVE BOX
[facility]  SABINO Griffin Y. Patel, S. Shah, G. Casimir  [phone]  ([phone] - weekdays after 5pm and weekends)    Name: BRIAN DEMPSEY  Age/Gender: 74y Male  MRN: [id number]    Interval History:  Patient seen and examined this morning.   No new complaints noted.  Notes reviewed  No concerning overnight events  Afebrile   Allergies: No Known Allergies      Objective:  Vitals:   T(F): 97.5 (03-11-25 @ 09:29), Max: 98.3 (03-10-25 @ 16:18)  HR: 83 (03-11-25 @ 09:29) (81 - 83)  BP: 147/76 (03-11-25 @ 09:29) (110/53 - 159/83)  RR: 18 (03-11-25 @ 09:29) (18 - 18)  SpO2: 98% (03-11-25 @ 09:29) (98% - 99%)  Physical Examination:  General: no acute distress  HEENT: normocephalic, atraumatic  Respiratory: breathing comfortably  Cardiovascular: S1 and S2 present  Gastrointestinal: nondistended  Extremities: LE with ischemic changes    Laboratory Studies:  CBC:                       8.9    12.90 )-----------( 268      ( 11 Mar 2025 09:50 )             30.4     WBC Trend:  12.90 03-11-25 @ 09:50  11.58 03-09-25 @ 07:09  12.94 03-08-25 @ 09:28  14.13 03-07-25 @ 08:57  12.14 03-06-25 @ 09:51  13.86 03-05-25 @ 10:21    CMP: 03-11    136  |  101  |  15  ----------------------------<  104[H]  3.9   |  26  |  2.78[H]    Ca    7.3[L]      11 Mar 2025 09:50    Creatinine: 2.78 mg/dL (03-11-25 @ 09:50)  Creatinine: 2.31 mg/dL (03-09-25 @ 07:09)  Creatinine: 2.90 mg/dL (03-07-25 @ 08:57)  Creatinine: 3.28 mg/dL (03-05-25 @ 10:21)    Microbiology: reviewed   Radiology: reviewed     Medications:  acetaminophen     Tablet .. 650 milliGRAM(s) Oral every 6 hours PRN  albuterol/ipratropium for Nebulization 3 milliLiter(s) Nebulizer every 6 hours  aspirin  chewable 81 milliGRAM(s) Oral daily  atorvastatin 40 milliGRAM(s) Oral at bedtime  bisacodyl 5 milliGRAM(s) Oral every 12 hours PRN  chlorhexidine 2% Cloths 1 Application(s) Topical daily  clopidogrel Tablet 75 milliGRAM(s) Oral daily  epoetin aleah-epbx (RETACRIT) Injectable 96447 Unit(s) IV Push <User Schedule>  guaiFENesin ER 1200 milliGRAM(s) Oral every 12 hours  HYDROmorphone  Injectable 2 milliGRAM(s) IV Push every 4 hours PRN  hydrOXYzine hydrochloride 25 milliGRAM(s) Oral three times a day  ibuprofen  Tablet. 400 milliGRAM(s) Oral every 12 hours  oxyCODONE    IR 10 milliGRAM(s) Oral every 4 hours PRN  oxyCODONE  ER Tablet 30 milliGRAM(s) Oral every 12 hours  pantoprazole    Tablet 40 milliGRAM(s) Oral before breakfast  polyethylene glycol 3350 17 Gram(s) Oral daily  senna 2 Tablet(s) Oral at bedtime  sodium chloride 0.9% lock flush 10 milliLiter(s) IV Push every 1 hour PRN    Current Antimicrobials:    Prior/Completed Antimicrobials:  piperacillin/tazobactam IVPB.  piperacillin/tazobactam IVPB.-  vancomycin  IVPB

## 2025-03-11 NOTE — PROGRESS NOTE ADULT - SUBJECTIVE AND OBJECTIVE BOX
[facility]-- WOUND TEAM -- FOLLOW UP NOTE  --------------------------------------------------------------------------------    24 hour events/subjective:          Diet:  Diet, Regular:   1000mL Fluid Restriction ([id number])  No Concentrated Potassium (03-07-25 @ 13:54)      ROS: General/ SKIN/ MSK/ Neuro/ GI see HPI  all other systems negative  pt unable to offer    ALLERGIES & MEDICATIONS  --------------------------------------------------------------------------------  Allergies    No Known Allergies    Intolerances          STANDING INPATIENT MEDICATIONS    albuterol/ipratropium for Nebulization 3 milliLiter(s) Nebulizer every 6 hours  aspirin  chewable 81 milliGRAM(s) Oral daily  atorvastatin 40 milliGRAM(s) Oral at bedtime  chlorhexidine 2% Cloths 1 Application(s) Topical daily  clopidogrel Tablet 75 milliGRAM(s) Oral daily  epoetin aleah-epbx (RETACRIT) Injectable 26134 Unit(s) IV Push <User Schedule>  guaiFENesin ER 1200 milliGRAM(s) Oral every 12 hours  hydrOXYzine hydrochloride 25 milliGRAM(s) Oral three times a day  ibuprofen  Tablet. 400 milliGRAM(s) Oral every 12 hours  oxyCODONE  ER Tablet 30 milliGRAM(s) Oral every 12 hours  pantoprazole    Tablet 40 milliGRAM(s) Oral before breakfast  polyethylene glycol 3350 17 Gram(s) Oral daily  senna 2 Tablet(s) Oral at bedtime      PRN INPATIENT MEDICATION  acetaminophen     Tablet .. 650 milliGRAM(s) Oral every 6 hours PRN  bisacodyl 5 milliGRAM(s) Oral every 12 hours PRN  oxyCODONE    IR 10 milliGRAM(s) Oral every 4 hours PRN  sodium chloride 0.9% lock flush 10 milliLiter(s) IV Push every 1 hour PRN        VITALS/PHYSICAL EXAM  --------------------------------------------------------------------------------  T(C): 37.1 (03-11-25 @ 15:42), Max: 37.1 (03-11-25 @ 15:42)  HR: 90 (03-11-25 @ 15:42) (81 - 90)  BP: 112/74 (03-11-25 @ 15:42) (112/74 - 159/83)  RR: 17 (03-11-25 @ 15:42) (17 - 18)  SpO2: 96% (03-11-25 @ 15:42) (96% - 99%)  Wt(kg): --        03-10-25 @ 07:01  -  03-11-25 @ 07:00  --------------------------------------------------------  IN: 0 mL / OUT: 0 mL / NET: 0 mL            LABS/ CULTURES/ RADIOLOGY:              8.9    12.90 >-----------<  268      [03-11-25 @ 09:50]              30.4     136  |  101  |  15  ----------------------------<  104      [03-11-25 @ 09:50]  3.9   |  26  |  2.78        Ca     7.3     [03-11-25 @ 09:50]                CAPILLARY BLOOD GLUCOSE                          A1C with Estimated Average Glucose Result: 5.7 % (12-21-24 @ 06:44)  A1C with Estimated Average Glucose Result: 4.9 % (10-09-24 @ 13:30)   [facility]-- WOUND TEAM -- FOLLOW UP NOTE  --------------------------------------------------------------------------------    24 hour events/subjective:    alert  afebrile  incontinent  improving pain  tolerating po w/o n/v but poor appetite  wife at bedside, visualizes wounds     all questions answered to their expressed satisfaction      Diet:  Diet, Regular:   1000mL Fluid Restriction ([id number])  No Concentrated Potassium (03-07-25 @ 13:54)      ROS: General/ SKIN/ MSK/ Neuro/ GI see HPI  all other systems negative      ALLERGIES & MEDICATIONS  --------------------------------------------------------------------------------      No Known Allergies      STANDING INPATIENT MEDICATIONS  albuterol/ipratropium for Nebulization 3 milliLiter(s) Nebulizer every 6 hours  aspirin  chewable 81 milliGRAM(s) Oral daily  atorvastatin 40 milliGRAM(s) Oral at bedtime  chlorhexidine 2% Cloths 1 Application(s) Topical daily  clopidogrel Tablet 75 milliGRAM(s) Oral daily  epoetin aleah-epbx (RETACRIT) Injectable 41128 Unit(s) IV Push <User Schedule>  guaiFENesin ER 1200 milliGRAM(s) Oral every 12 hours  hydrOXYzine hydrochloride 25 milliGRAM(s) Oral three times a day  ibuprofen  Tablet. 400 milliGRAM(s) Oral every 12 hours  oxyCODONE  ER Tablet 30 milliGRAM(s) Oral every 12 hours  pantoprazole    Tablet 40 milliGRAM(s) Oral before breakfast  polyethylene glycol 3350 17 Gram(s) Oral daily  senna 2 Tablet(s) Oral at bedtime      PRN INPATIENT MEDICATION  acetaminophen     Tablet .. 650 milliGRAM(s) Oral every 6 hours PRN  bisacodyl 5 milliGRAM(s) Oral every 12 hours PRN  oxyCODONE    IR 10 milliGRAM(s) Oral every 4 hours PRN  sodium chloride 0.9% lock flush 10 milliLiter(s) IV Push every 1 hour PRN        VITALS/PHYSICAL EXAM  --------------------------------------------------------------------------------  T(C): 37.1 (03-11-25 @ 15:42), Max: 37.1 (03-11-25 @ 15:42)  HR: 90 (03-11-25 @ 15:42) (81 - 90)  BP: 112/74 (03-11-25 @ 15:42) (112/74 - 159/83)  RR: 17 (03-11-25 @ 15:42) (17 - 18)  SpO2: 96% (03-11-25 @ 15:42) (96% - 99%)  Wt(kg): --      NAD,  A&Ox3, Obese, WD/ WN/ W  [facility] P500 bed  HEENT:  NC/AT, EOMI, sclera clear, mucosa moist, throat clear, trachea midline, neck supple  Respiratory: nonlabored w/ equal chest rise  Gastrointestinal: soft NT/ND (+)colostomy pink / viable   : (+) superpubic cath     Scrotum w/fading hyperpigmented toned amorphic skin changes 2cm x 5cm      no blistering or drainage  No odor, erythema, increased warmth, tenderness, induration, fluctuance, nor crepitus  Neurology:  weakened strength & sensation grossly intact  Psych: calm/ appropriate  Musculoskeletal: FROM, no deformities/ contractures  Vascular: BLE equally warm/cool,  no clubbing      (+)BLE edema equal       no BLE DP/PT pulses palpable       BLE demarcation noted across toes and heels w/purple /black       no blistering or drainage  No odor, erythema, increased warmth, tenderness, induration, fluctuance, nor crepitus   Skin: thin, dry, pale, frail,  ecchymosis w/o hematoma  Sacrum into Lt buttocks into perineum evolving Unstageable pressure injury     black dry eschar w/ fading purple and pink denuded skin changes peripherally     10cm x 12cm x 0.1cm  Rt Ischium unstageable pressure injury     grey slough w/ denuded pink skin changes     pt w/ excoriated skin changes in periwound buttocks area     2cm x 4cm x0.1cm  Lt Buttocks evolving DTI w/ fading purple maroon and pink denuded skin changes     2cm x 8cm      no blistering or drainage  No odor, erythema, increased warmth, tenderness, induration, fluctuance, nor crepitus    Lt upper thigh wrapping posteriorly w/ 2 wounds    Upper posterior thigh 4cm x 8cmx  0.1cm black eschar w/ denuded skin changes   inferiorly starting anterior medially and wrapping around posterior        2cm x 15cm fading purple / maroon skin changes w/ blistering  Lt Knee  (6cm x 7cm) & Lateral upper calf (5cm x 4cm) and Lt calf to ankle (10cm x 3cm)     w/ fading purple/ maroon amorphic skin changes    blistering but no open skin or drainage  No odor, erythema, increased warmth, tenderness, induration, fluctuance, nor crepitus        LABS/ CULTURES/ RADIOLOGY:              8.9    12.90 >-----------<  268      [03-11-25 @ 09:50]              30.4     136  |  101  |  15  ----------------------------<  104      [03-11-25 @ 09:50]  3.9   |  26  |  2.78        Ca     7.3     [03-11-25 @ 09:50]          A1C with Estimated Average Glucose Result: 5.7 % (12-21-24 @ 06:44)  A1C with Estimated Average Glucose Result: 4.9 % (10-09-24 @ 13:30)

## 2025-03-11 NOTE — PROGRESS NOTE ADULT - ASSESSMENT
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 He is well known to me from multiple recent admisions  s/p admissions at [facility] 12/20-12/25/24 and 2/4-2/7 with SONDRA on CKD with associated uremic symptoms.   At the last admission he had expressed strong interest to try to hold off with HD intiation but he has been getting worse clinically at home.  His SONDRA and uremic symptoms significantly improved after the first admission; they improved a to mild extent during the 2nd admission to the point where he was able to be discharged without HD. Since then, however, he has worsened further.   He has been suffering from progressively worsening diffuse pruritus, loss of appetite, and generalized weakness and falls.   His nephrologist and PCP has been speaking with him and his family over the past few days,   The initial plan was that he would present to the [facility] ER Monday 2/17 (ie tomorrow) for HD initiation. Today, however, he fell and sustained trauma to his knee. Given the fall and concern for knee fracture, he presented to the ER today. multiple xrays show no Fractures.      CKD5, uremia, requiring initiation of HD  Rash, seborrheic dermatitis  Pruritis  Anemia  PAD  SP catheter, chronic  Left inferior pole acute on chronic patella Fx    plan  - HD via R subclavian permacath  -3/11: ptn has no new c/o other than feeling constipated, lactulose ordered. also in preparation for [facility] will dc prn IV DIlaudid, cont prn OXy IR  -3/10:  ptn is awake, alert, wife at bedside, i instructed them to give rehab choices. also awaiting SPC change to be done by urology. pain is controlled  -3/9: seen by PT, will refer to [facility]. choices to be given in AM. this was d/w ptn, daughter, wife.   -3/8:  ptn didnt want to get PT eval done, will reorder. ptn needs [facility]DIA placement. PMNR consult ordered  - 3/7: ptn is alert , pain is controlled, DC planning d/w ptn and HCP, daughter Yanique  -3/6:  ptn is awake, alert, ecchymotic feet look improved, pain is controlled. DC planning to [facility]  3/4-5 - s/p b/l iliac arteries angioplasty and stent placements on 3/3. today has new ecchymoses in b/l LE, concern for arterial insufficiency. vascular aware.. wound from Left knee immobilizer is dressed and healing. pain is controlled.   LEFT UE AVF placement/scheduling will be on outptn basis as per vascular. dc planning to [facility]  - 3/3: ptn is s/p angiogram RLE : b/l iliac arteries w successful angioplasty and stents. in PACU. awaitng HD.  ptn has a pressure wound from the LLE immobilizer posteriorly proximal to the knee. its been on 2/2 knee fracture, applied by ortho and managed by ptn's wife as per ptn's and wife's request , this was d/w nursing and nurse manager ms Dipikaodell.. immobilizer should be managed by orthopedics and nursing. will keep it off, only keep on when repositioning for care and then remove. wound care to F/U . this was discussed at length w daughter Yanique and wife Dee Dee.   -3/1-3/2: ptn is smiling, pain free, had HD 2/28 and 3/1, awaiting RLE angiogram 3/3, on Heparin drip, euvolemic  -2/28: ptn is lethargic, awaiting RLE angiogram possible fem-fem bypass hopefully on 3/3 pending OR availability, awaiting HD today    -2/27:  plan for angiogram in am with possible angioplasty, possible stent, possible fem-fem bypass. medically cleared for angiogram and possible surgery, stent, angioplasty. pain is controlled. remains on heparin drip as per vascular. HD as per renal    -2/26:  ptn is sleeping, pain is controlled, he was seen by wound care and vascular attending. planning on angiogram 2/2 severe PAD in LE on CTA. he also spoke to HCP. ptn and HCP in agreement. ptn was started on HEPARIN drip as per vascular recs. RIJ permacath done 2/25    - 2/25: ptn states he is hallucinating, but not at present, his MS is at baseline, his pain is controlled, he still needs prn pain meds when he is moved in the bed or for testing or for HD. awaiting Permacath, AVF creation, LE bypass to be d/w Dr. Swenson and the ptn tomorrow. ptn has cardiology clearance  if he opts for. Ptn has sacral decubiti and posterior thigh and buttock decibiti and b/l heel decubiti. Z flow bootie d/w RN, get wound care consult    -2/24: pain is controlled  if the LLE is immobile and fully extended. doesn't tolerate the knee immobilizer. has a medium condyle and acute on chronic patella fx in LEFT knee. as per vascular ptn will need iliac stent  w a fem-fem bypass, possible axillo-femoral bypass. for this surgery he would need cardiac work up . more pressing surgery is LUE AVF creation,  in IR will arrange for Permacath. for pain control: Motrin 400 mg q12H, Oxy ER 30 mg q12H, Oxy IR 10 for mod pain and dilaudid 2 mg iv for severe pain. still has pruritis though improved, will raise atarax 25 mg to tid. plan of care d/w daughter Norma Persaud , ptn and his wife. Also d/w renal, card, vascular, ACP    -2/23: Daughter Yanique is the HCP, she and the ptn filled out the paperwork. daily plan and findings d/w her and the ptn. MS is at abseline, c/o b/l LE foot pain and Left Knee pain. xrays of left knee: cannot r/o acute on chronic inferior pole patellar Fx. knee immobilizer is on, awaiting ortho consult. doubt needs any intervention awaiting Ct chest today, will add CT Left knee. ptn states itching has recurred, severe pain has recurred. will resume Atatrax ( 25 mg bid) and Oxycodone ER , but at a lower dose 15 mg q12H. cont prn analgesics. HD as per renal, awaiting Vascular consult f/u re CTA A/L &LE and recs. ptn also wants AVF surgery on this admission. Coughing has stopped    - 2/22: ptn is drowsy but arousable, calmer today, answers questions appropriately. he is pain free, he stopped coughing, he denies having pruritis: will DC:  OXY ER, Atarax, Tessalon perles.     -  2/21: ptn is tearful, a bit confused, coughing, recognizes me and family at bedside. GOC d/w daughter and wife. AMS prob delirium 2/2 acute illness +/- opiods, lyrica, atarac. will lower atarak to tid, dc lyrica, cont Oxy 2/2 ptn has severe LE pain. neuro called for eval. Head ct done yesterday w no acute findings. CTA A/P w LE run fof: severe PAD, vascular to follow up on plan fo care. plan for HD tomorrow and next week place on tiw schedule of MWF. will need tunneled catheter prior to DC and will d/w vascular scheduling of AVF. ptn wants all to be done while inptn. daughter wants to be HCP as per ptn's wishes. she was given paperwork to get it signed and witnessed and will present to nursing thereafter. details of findings and complaints and plan of care d/w daughter and wife. spent 60 min. RVP ordered. start mucinex , tessalon perles, duonebs, get CT chest to r/o PNA. pulm called    -  2/20:  ptn had RRT 2/2 AMS and tremors. no SZ, no LOC. noted to have Na 123( hyponatremia), given 500 cc NS. Gabapentin DCed. ptn had been taking gabapentin at home PTA. Pain is controlled. Pruritis resolved, tolerating HD otherwise.     - Pain management:  cont Oxycodone 10 IR q6H,  DIlaudid prn severe pain 2 mg q4H prn.   - cont outptn meds  - DVT ppx w HSC

## 2025-03-11 NOTE — PROGRESS NOTE ADULT - ASSESSMENT
74M with history of HTN, polio with multiple subsequent problems including LE weakness requiring wheelchair, neurogenic bladder s/p suprapubic catheter, and colostomy s/p iatrogenic rectal injury 2/2023, and CKD 5 presenting to the ED with weakness and falls. Admitted for initiation of HD now s/p R IJ shiley placement on 2/17/25 and conversion to tunneled catheter on 2/25. Vascular surgery initially consulted for AVF creation; however, patient's worsening PAD with worsening ischemic changes is the more urgent issue now s/p b/l iliac stent placement on 3/3/25.     Wound Consult requested to assist w/ management of:  Sacral and Rt Ischial Unstageable Pressure Injury  Left Buttock DTI  BLE severe PAD w/ extensive wounds      Buttocks/ Sacrum/ Thighs continue w/TRIAD BID and prn soiling        Continue w/ attends under pads and Pericare as per protocol  B LEG wounds/ skin changes CAVILON QD  Appreciate Vascular input- continue to follow up  Feet f/u as per Podiatry/ Vascular  BLE elevation & Compression  Abx per Medicine/ ID  Consider Palliative for GOC and pain mngt  Moisturize intact skin w/ SWEEN cream BID  Nutrition Consult for optimization in pt w/ Moderate Protein Calorie Malnutrition,        encourage high quality protein, sandy/ prosource, MVI & Vit C to promote wound healing  Continue turning and positioning w/ offloading assistive devices as per protocol  Waffle Cushion to chair when oob to chair  Continue w/ low air loss pressure redistribution bed surface   Pt will need Group 2 mattress on hospital al bed and ROHO cushion for wheel chair upon discharge home  Care as per medicine, will follow w/ you  Upon discharge f/u as outpatient at [facility] [address] [place] [address] [phone]  Seen w/ attng & RN and D/w team  Thank you for this consult  Danica Humphreys PA-C [id number]  Nights/ Weekends/ Holidays please call:  General Surgery Consult pager [phone]) for emergencies  Wound PT for multilayer leg wrapping or VAC issues ([phone])   I spent  50minutes face to face w/ this pt of which more than 50% of the time was spent counseling & coordinating care of this pt.

## 2025-03-12 NOTE — PROGRESS NOTE ADULT - ASSESSMENT
74M presents with b/l ischemic changes to digits  - Patient seen and evaluated  - b/l digits 1-5, increased ischemic/ dusky changes, decreased warmth, No open ulcerations, no purulence, no fluctuance, no malodor, or clinical signs of infection.  - Vascular recs, appreciated  - No acute podiatric surgical intervention at this time  - left heel ulcer wound care with mupirocin and DSD daily  - Rec z flow boots in bed [place] times  - Pt to follow up in wound care clinic with Dr. Mullins within 1 week of discharge to closely monitor ischemic changes to b/l digits

## 2025-03-12 NOTE — PROGRESS NOTE ADULT - SUBJECTIVE AND OBJECTIVE BOX
Overnight events noted      VITAL:  T(C): , Max: 37.1 (03-11-25 @ 15:42)  T(F): , Max: 98.7 (03-11-25 @ 15:42)  HR: 77 (03-12-25 @ 00:37)  BP: 156/78 (03-12-25 @ 00:37)  BP(mean): --  RR: 18 (03-12-25 @ 00:37)  SpO2: 97% (03-12-25 @ 00:37)  Wt(kg): --      PHYSICAL EXAM:  Constitutional: alert, NAD  HEENT: NCAT, DMM  Neck: Supple, No JVD  Respiratory: CTA-b/l  Cardiovascular: RRR s1s2, no m/r/g  Gastrointestinal: BS+, soft, NT/ND  : (+)suprapubic cath  Extremities: No peripheral edema b/l  Neurological: reduced generalized strength  Back: no CVAT b/l  Skin: (+)cyanotic changes b/l feet  Access: RIJ tunneled cath      LABS:                        8.9    12.90 )-----------( 268      ( 11 Mar 2025 09:50 )             30.4     Na(136)/K(3.9)/Cl(101)/HCO3(26)/BUN(15)/Cr(2.78)Glu(104)/Ca(7.3)/Mg(--)/PO4(--)    03-11 @ 09:50        IMPRESSION: 74M w/ polio, neurogenic bladder/suprapubic catheter, 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 Due for next HD today    (2)Hypophosphatemia - now off PO4 binders and off dietary PO4 restriction    (3)Anemia - s/p IV iron; on Retacrit with HD    (4)PAD - s/p LE bypass 3/3/25 - cyanotic changes of toes b/l - for further management after discharge    (5) - s/p suprapubic cath change 3/10/25    (6)Constipation      RECOMMEND:   (1)HD today as ordered  (2)D/C planning per primary team          Rafita Denny MD  [facility]  Office/on call physician: [phone]  Cell (7a-7p): [phone]       Seen on HD  No pain, no sob  Good BMs yesterday      VITAL:  T(C): , Max: 37.1 (03-11-25 @ 15:42)  T(F): , Max: 98.7 (03-11-25 @ 15:42)  HR: 77 (03-12-25 @ 00:37)  BP: 156/78 (03-12-25 @ 00:37)  BP(mean): --  RR: 18 (03-12-25 @ 00:37)  SpO2: 97% (03-12-25 @ 00:37)  Wt(kg): --      PHYSICAL EXAM:  Constitutional: alert, NAD  HEENT: NCAT, DMM  Neck: Supple, No JVD  Respiratory: CTA-b/l  Cardiovascular: RRR s1s2, no m/r/g  Gastrointestinal: BS+, soft, NT/ND  : (+)suprapubic cath  Extremities: No peripheral edema b/l  Neurological: reduced generalized strength  Back: no CVAT b/l  Skin: (+)cyanotic changes b/l feet  Access: RIJ tunneled cath-accessed      LABS:                        8.9    12.90 )-----------( 268      ( 11 Mar 2025 09:50 )             30.4     Na(136)/K(3.9)/Cl(101)/HCO3(26)/BUN(15)/Cr(2.78)Glu(104)/Ca(7.3)/Mg(--)/PO4(--)    03-11 @ 09:50        IMPRESSION: 74M w/ polio, neurogenic bladder/suprapubic catheter, 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 On HD now, tolerating    (2)Hypophosphatemia - now off PO4 binders and off dietary PO4 restriction    (3)Anemia - s/p IV iron; on Retacrit with HD    (4)PAD - s/p LE bypass 3/3/25 - cyanotic changes of toes b/l - for further management after discharge    (5) - s/p suprapubic cath change 3/10/25    (6)Constipation - improved      RECOMMEND:   (1)HD today as ordered  (2)D/C planning per primary team          Rafita Denny MD  [facility]  Office/on call physician: [phone]  Cell (7a-7t): [phone]

## 2025-03-12 NOTE — PROGRESS NOTE ADULT - SUBJECTIVE AND OBJECTIVE BOX
Subjective: Patient seen and examined. No new events except as noted.   Pt seen at HD    REVIEW OF SYSTEMS:  unable to obtain     MEDICATIONS:  MEDICATIONS  (STANDING):  albuterol/ipratropium for Nebulization 3 milliLiter(s) Nebulizer every 6 hours  aspirin  chewable 81 milliGRAM(s) Oral daily  atorvastatin 40 milliGRAM(s) Oral at bedtime  chlorhexidine 2% Cloths 1 Application(s) Topical daily  clopidogrel Tablet 75 milliGRAM(s) Oral daily  epoetin aleah-epbx (RETACRIT) Injectable 25186 Unit(s) IV Push <User Schedule>  guaiFENesin ER 1200 milliGRAM(s) Oral every 12 hours  hydrOXYzine hydrochloride 25 milliGRAM(s) Oral three times a day  ibuprofen  Tablet. 400 milliGRAM(s) Oral every 12 hours  oxyCODONE  ER Tablet 30 milliGRAM(s) Oral every 12 hours  pantoprazole    Tablet 40 milliGRAM(s) Oral before breakfast  polyethylene glycol 3350 17 Gram(s) Oral daily  senna 2 Tablet(s) Oral at bedtime      PHYSICAL EXAM:  T(C): 36.6 (03-12-25 @ 08:32), Max: 37.1 (03-11-25 @ 15:42)  HR: 79 (03-12-25 @ 08:32) (77 - 90)  BP: 149/71 (03-12-25 @ 08:32) (112/74 - 156/78)  RR: 16 (03-12-25 @ 08:32) (16 - 18)  SpO2: 96% (03-12-25 @ 08:32) (96% - 97%)  Wt(kg): --  I&O's Summary        Appearance: NAD  HEENT:  Dry oral mucosa, PERRL, EOMI	  Lymphatic: No lymphadenopathy  Cardiovascular: Normal S1 S2, No JVD, No murmurs, No edema  Respiratory: Lungs clear to auscultation	  Psychiatry: A & O x 3, Mood & affect appropriate  Skin: No rashes, No ecchymoses, No cyanosis	  Neurologic: Non-focal  GI/Abd: Soft, obese, nontender. Dressing to Q C/D/I. Suprapubic catheter in place  Ext: Palp femoral pulses bilat. BLE atrophic, minimal dorsi/plantarflexion bilaterally. Sensation grossly intact. LLE edematous compared to R, erythema to right toes/forefoot. mottling of right toes/forefoot/heel  LLE:  small superficial abrasion, +edema and +ecchymosis over L knee  +TTP over L knee, no TTP along remainder of extremity; compartments soft  Limited ROM at knee 2/2 pain  No gross varus/valgus laxity, but assessment limited 2/2 pain  Motor: TA/EHL/GS/FHL intact  Sensory: DP/SP/Tib/Jordan/Saph SILT  +DP pulse (symmetric relative to contralateral side), WWP   pressure wound from the LLE immobilizer posteriorly proximal to the knee.  Vascular: Peripheral pulses palpable 2+ bilaterally        LABS:    CARDIAC MARKERS:                                8.6    15.12 )-----------( 277      ( 12 Mar 2025 08:49 )             29.0     03-11    136  |  101  |  15  ----------------------------<  104[H]  3.9   |  26  |  2.78[H]    Ca    7.3[L]      11 Mar 2025 09:50      proBNP:   Lipid Profile:   HgA1c:   TSH:             TELEMETRY: 	    ECG:  	  RADIOLOGY:   DIAGNOSTIC TESTING:  [ ] Echocardiogram:  [ ]  Catheterization:  [ ] Stress Test:    OTHER:

## 2025-03-12 NOTE — PROGRESS NOTE ADULT - SUBJECTIVE AND OBJECTIVE BOX
Date of Service: 03-12-25 @ 16:22    Patient is a 74y old  Male who presents with a chief complaint of ESRD requiring HD, uremia (12 Mar 2025 10:42)      Any change in ROS: seems to be doing  ok : no sob:   no cough    no phlegm  :      MEDICATIONS  (STANDING):  albuterol/ipratropium for Nebulization 3 milliLiter(s) Nebulizer every 6 hours  aspirin  chewable 81 milliGRAM(s) Oral daily  atorvastatin 40 milliGRAM(s) Oral at bedtime  chlorhexidine 2% Cloths 1 Application(s) Topical daily  clopidogrel Tablet 75 milliGRAM(s) Oral daily  epoetin aleah-epbx (RETACRIT) Injectable 37940 Unit(s) IV Push <User Schedule>  guaiFENesin ER 1200 milliGRAM(s) Oral every 12 hours  hydrOXYzine hydrochloride 25 milliGRAM(s) Oral three times a day  ibuprofen  Tablet. 400 milliGRAM(s) Oral every 12 hours  oxyCODONE  ER Tablet 30 milliGRAM(s) Oral every 12 hours  pantoprazole    Tablet 40 milliGRAM(s) Oral before breakfast  polyethylene glycol 3350 17 Gram(s) Oral daily  senna 2 Tablet(s) Oral at bedtime    MEDICATIONS  (PRN):  acetaminophen     Tablet .. 650 milliGRAM(s) Oral every 6 hours PRN Temp greater or equal to 38C (100.4F), Mild Pain (1 - 3)  bisacodyl 5 milliGRAM(s) Oral every 12 hours PRN Constipation  oxyCODONE    IR 10 milliGRAM(s) Oral every 4 hours PRN Moderate Pain (4 - 6)  sodium chloride 0.9% lock flush 10 milliLiter(s) IV Push every 1 hour PRN Pre/post blood products, medications, blood draw, and to maintain line patency    Vital Signs Last 24 Hrs  T(C): 36.6 (12 Mar 2025 11:32), Max: 36.6 (12 Mar 2025 07:37)  T(F): 97.8 (12 Mar 2025 11:32), Max: 97.8 (12 Mar 2025 07:37)  HR: 78 (12 Mar 2025 11:32) (77 - 80)  BP: 131/59 (12 Mar 2025 11:32) (131/59 - 156/78)  BP(mean): --  RR: 16 (12 Mar 2025 11:32) (16 - 18)  SpO2: 95% (12 Mar 2025 11:32) (95% - 97%)    Parameters below as of 12 Mar 2025 11:32  Patient On (Oxygen Delivery Method): room air        I&O's Summary    12 Mar 2025 07:01  -  12 Mar 2025 16:22  --------------------------------------------------------  IN: 0 mL / OUT: 350 mL / NET: -350 mL          Physical Exam:   GENERAL: NAD, well-groomed, well-developed  HEENT: VINNY/   Atraumatic, Normocephalic  ENMT: No tonsillar erythema, exudates, or enlargement; Moist mucous membranes, Good dentition, No lesions  NECK: Supple, No JVD, Normal thyroid  CHEST/LUNG: Clear to auscultaion, ; No rales, rhonchi, wheezing, or rubs  CVS: Regular rate and rhythm; No murmurs, rubs, or gallops  GI: : Soft, Nontender, Nondistended; Bowel sounds present  NERVOUS SYSTEM:  Alert & Oriented X3, Good concentration; Motor Strength 5/5 B/L upper and lower extremities; DTRs 2+ intact and symmetric  EXTREMITIES:  discolored digits bilaterally:   LYMPH: No lymphadenopathy noted  SKIN: No rashes or lesions  ENDOCRINOLOGY: No Thyromegaly  PSYCH: calm     Labs:                              8.6    15.12 )-----------( 277      ( 12 Mar 2025 08:49 )             29.0                         8.9    12.90 )-----------( 268      ( 11 Mar 2025 09:50 )             30.4                         9.8    11.58 )-----------( 186      ( 09 Mar 2025 07:09 )             33.3     03-11    136  |  101  |  15  ----------------------------<  104[H]  3.9   |  26  |  2.78[H]  03-09    135  |  98  |  14  ----------------------------<  86  4.3   |  22  |  2.31[H]    Ca    7.3[L]      11 Mar 2025 09:50      CAPILLARY BLOOD GLUCOSE              Urinalysis Basic - ( 11 Mar 2025 09:50 )    Color: x / Appearance: x / SG: x / pH: x  Gluc: 104 mg/dL / Ketone: x  / Bili: x / Urobili: x   Blood: x / Protein: x / Nitrite: x   Leuk Esterase: x / RBC: x / WBC x   Sq Epi: x / Non Sq Epi: x / Bacteria: x        rad< from: Xray Knee 4 Views, Left (03.06.25 @ 03:21) >  Vascular calcifications are present.    IMPRESSION:    Previously seen medial femoral condyle fracture is better visualized on   CT knee on 2/23/2025.    --- End of Report ---          ANDREA FUENTES MD; Resident Radiologist  This document has been electronically signed.  LORETTA MICHAELS DO; Attending Radiologist  This document has been electronically signed. Mar  6 2025 11:32AM    < end of copied text >  < from: CT Chest No Cont (02.23.25 @ 16:46) >  osteolytic lesions. There are multilevel degenerative changes of thoracic   spine , with disk space narrowing and osteophytosis. There is mild   subcutaneous edema in left lateral upper abdominal wall.    IMPRESSION:  Mucoid impaction and airway associated subpleural groundglass opacities,   most prominent in left lower lobe and to a lesser degree in right lower   lobe.  Mild bronchial wall thickening, likely inflammatory.    --- End of Report ---            TREVON YOUNG MD; Attending Radiologist  This document has been electronically signed. Feb 23 2025  6:22PM    < end of copied text >      RECENT CULTURES:        RESPIRATORY CULTURES:          Studies  Chest X-RAY  CT SCAN Chest   Venous Dopplers: LE:   CT Abdomen  Others

## 2025-03-12 NOTE — PROGRESS NOTE ADULT - ASSESSMENT
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  came in after fall and for HD.     2/20 CTH neg   \Na 123 --> now 133   A1c 5.7   TSH WNL 3.46   o/e 2/21 AAOx2, uppers 4/5, lowers limited .  2/23; family bedside upset that he has pain in leg after he was moved.  patient going for imaging now.   s/p R IJ permacath by IR 2/25 2/27 AAOx3   sopoke with family bedisde 3/12 mental status baseline     Imrpssion  1) AMS, waxing and waing , likely multifactorial from infection, metabolic/electrolyte derrangements/ delerium / medication effect , ureemia which is imporving   2) polio  3) fall 2/2 weakness  4) hyponatremia to 123 2/20  improved 133 -->136     - AVF outpatient     f/u vascular; no vascluar options ; f/u with Messi from vasculare to schedule outpatient procedure   - on DAPT   - was getting oxycodone ER 30mg BID and oxy PRN IR i10 and dilauded PRN ;   - f/u psych   - limit sedating meds   - continue to monitor and correct metabolic derrangements .  - delerium precuations.   - frequent re orientation  - check b12, RPR, ammonia level   - will consdier MRI brain or EEG if doesn't improve but seems to have been improving   - PT/OT   - check FS, glucose control <180  - GI/DVT ppx  - Thank you for allowing me to participate in the care of this patient. Call with questions.    spoke with wife 3/12  dc planning [facility] needs auth   Paul Limon MD  Vascular Neurology  Office: [phone]

## 2025-03-12 NOTE — PROGRESS NOTE ADULT - ASSESSMENT
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 He is well known to me from multiple recent admisions  s/p admissions at [facility] 12/20-12/25/24 and 2/4-2/7 with SONDRA on CKD with associated uremic symptoms.   At the last admission he had expressed strong interest to try to hold off with HD intiation but he has been getting worse clinically at home.  His SONDRA and uremic symptoms significantly improved after the first admission; they improved a to mild extent during the 2nd admission to the point where he was able to be discharged without HD. Since then, however, he has worsened further.   He has been suffering from progressively worsening diffuse pruritus, loss of appetite, and generalized weakness and falls.   His nephrologist and PCP has been speaking with him and his family over the past few days,   The initial plan was that he would present to the [facility] ER Monday 2/17 (ie tomorrow) for HD initiation. Today, however, he fell and sustained trauma to his knee. Given the fall and concern for knee fracture, he presented to the ER today. multiple xrays show no Fractures.      CKD5, uremia, requiring initiation of HD  Rash, seborrheic dermatitis  Pruritis  Anemia  PAD  SP catheter, chronic  Left inferior pole acute on chronic patella Fx    plan  - HD via R subclavian permacath  -3/12: ptn is no longer constipated. doing well off prn IV DIlaudid. awaiting dc to [facility]  -3/11: ptn has no new c/o other than feeling constipated, lactulose ordered. also in preparation for [facility] will dc prn IV DIlaudid, cont prn OXy IR  -3/10:  ptn is awake, alert, wife at bedside, i instructed them to give rehab choices. also awaiting SPC change to be done by urology. pain is controlled  -3/9: seen by PT, will refer to [facility]. choices to be given in AM. this was d/w ptn, daughter, wife.   -3/8:  ptn didnt want to get PT eval done, will reorder. ptn needs [facility] placement. PMNR consult ordered  - 3/7: ptn is alert , pain is controlled, DC planning d/w ptn and HCP, daughter Yanique  -3/6:  ptn is awake, alert, ecchymotic feet look improved, pain is controlled. DC planning to [facility]  3/4-5 - s/p b/l iliac arteries angioplasty and stent placements on 3/3. today has new ecchymoses in b/l LE, concern for arterial insufficiency. vascular aware.. wound from Left knee immobilizer is dressed and healing. pain is controlled.   LEFT UE AVF placement/scheduling will be on outptn basis as per vascular. dc planning to [facility]  - 3/3: ptn is s/p angiogram RLE : b/l iliac arteries w successful angioplasty and stents. in PACU. awaitng HD.  ptn has a pressure wound from the LLE immobilizer posteriorly proximal to the knee. its been on 2/2 knee fracture, applied by ortho and managed by ptn's wife as per ptn's and wife's request , this was d/w nursing and nurse manager ms Bobmykel.. immobilizer should be managed by orthopedics and nursing. will keep it off, only keep on when repositioning for care and then remove. wound care to F/U . this was discussed at length w daughter Yanique and wife Dee Dee.   -3/1-3/2: ptn is smiling, pain free, had HD 2/28 and 3/1, awaiting RLE angiogram 3/3, on Heparin drip, euvolemic  -2/28: ptn is lethargic, awaiting RLE angiogram possible fem-fem bypass hopefully on 3/3 pending OR availability, awaiting HD today    -2/27:  plan for angiogram in am with possible angioplasty, possible stent, possible fem-fem bypass. medically cleared for angiogram and possible surgery, stent, angioplasty. pain is controlled. remains on heparin drip as per vascular. HD as per renal    -2/26:  ptn is sleeping, pain is controlled, he was seen by wound care and vascular attending. planning on angiogram 2/2 severe PAD in LE on CTA. he also spoke to HCP. ptn and HCP in agreement. ptn was started on HEPARIN drip as per vascular recs. RIJ permacath done 2/25    - 2/25: ptn states he is hallucinating, but not at present, his MS is at baseline, his pain is controlled, he still needs prn pain meds when he is moved in the bed or for testing or for HD. awaiting Permacath, AVF creation, LE bypass to be d/w Dr. Swenson and the ptn tomorrow. ptn has cardiology clearance  if he opts for. Ptn has sacral decubiti and posterior thigh and buttock decibiti and b/l heel decubiti. Z flow bootie d/w RN, get wound care consult    -2/24: pain is controlled  if the LLE is immobile and fully extended. doesn't tolerate the knee immobilizer. has a medium condyle and acute on chronic patella fx in LEFT knee. as per vascular ptn will need iliac stent  w a fem-fem bypass, possible axillo-femoral bypass. for this surgery he would need cardiac work up . more pressing surgery is LUE AVF creation,  in IR will arrange for Permacath. for pain control: Motrin 400 mg q12H, Oxy ER 30 mg q12H, Oxy IR 10 for mod pain and dilaudid 2 mg iv for severe pain. still has pruritis though improved, will raise atarax 25 mg to tid. plan of care d/w daughter Norma Persaud , ptn and his wife. Also d/w renal, card, vascular, ACP    -2/23: Daughter Yanique is the HCP, she and the ptn filled out the paperwork. daily plan and findings d/w her and the ptn. MS is at abseline, c/o b/l LE foot pain and Left Knee pain. xrays of left knee: cannot r/o acute on chronic inferior pole patellar Fx. knee immobilizer is on, awaiting ortho consult. doubt needs any intervention awaiting Ct chest today, will add CT Left knee. ptn states itching has recurred, severe pain has recurred. will resume Atatrax ( 25 mg bid) and Oxycodone ER , but at a lower dose 15 mg q12H. cont prn analgesics. HD as per renal, awaiting Vascular consult f/u re CTA A/L &LE and recs. ptn also wants AVF surgery on this admission. Coughing has stopped    - 2/22: ptn is drowsy but arousable, calmer today, answers questions appropriately. he is pain free, he stopped coughing, he denies having pruritis: will DC:  OXY ER, Atarax, Tessalon perles.     -  2/21: ptn is tearful, a bit confused, coughing, recognizes me and family at bedside. GOC d/w daughter and wife. AMS prob delirium 2/2 acute illness +/- opiods, lyrica, atarac. will lower atarak to tid, dc lyrica, cont Oxy 2/2 ptn has severe LE pain. neuro called for eval. Head ct done yesterday w no acute findings. CTA A/P w LE run fof: severe PAD, vascular to follow up on plan fo care. plan for HD tomorrow and next week place on tiw schedule of MWF. will need tunneled catheter prior to DC and will d/w vascular scheduling of AVF. ptn wants all to be done while inptn. daughter wants to be HCP as per ptn's wishes. she was given paperwork to get it signed and witnessed and will present to nursing thereafter. details of findings and complaints and plan of care d/w daughter and wife. spent 60 min. RVP ordered. start mucinex , tessalon perles, duonebs, get CT chest to r/o PNA. pulm called    -  2/20:  ptn had RRT 2/2 AMS and tremors. no SZ, no LOC. noted to have Na 123( hyponatremia), given 500 cc NS. Gabapentin DCed. ptn had been taking gabapentin at home PTA. Pain is controlled. Pruritis resolved, tolerating HD otherwise.     - Pain management:  cont Oxycodone 10 IR q6H,  DIlaudid prn severe pain 2 mg q4H prn.   - cont outptn meds  - DVT ppx w [facility]SC

## 2025-03-12 NOTE — PROGRESS NOTE ADULT - PROBLEM SELECTOR PLAN 4
-Per vascular  -S/p b/l iliac stent placement on 3/3/25  -Podiatry f/u.  3/9: has cyanosed digits:  defer to vascular  /: vascular following  3/123: per vascular surgery

## 2025-03-12 NOTE — PROGRESS NOTE ADULT - ASSESSMENT
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 The initial plan was that he would present to the [facility] ER Monday 2/17 for HD initiation. However, day of admission, he fell and sustained trauma to his knee. Called to consult for cough, elevated R hemidiaphragm.

## 2025-03-12 NOTE — PROGRESS NOTE ADULT - ASSESSMENT
74 year old male with CKD, sp polio, paraplegia with associated neurogenic bladder s/p suprapubic catheter who was admitted s/p fall on 2/16.   CKD - ESRD, now s/p DEYA [place] 2/17, on HD  ruled out cellulitis around suprapubic cath  2/20 s/p RRT for tremors and confusion -- pt with chronic tremors although notably worse  hyponatremia    mrsa negative   2/20 CXR with clear lungs   SPC site with chronic/stable inflammatory changes, no drainage - no sign of SSTI  CTA abd with occlusion of b/l external iliac arteries and b/l superficial femoral arteries with distal reconstitution at popliteal arteries; patent three vessel runoff of RLE with 2 vessel runoff of LLE with occlusion of L mid anterior tibial artery with distal reconstitution  Bcx negative to date   mental status at baseline  WBC noted, remains afebrile   Vascular following for worsening PAD with worsening ischemic changes   -3/3 s/p RLE angiogram b/l iliac artery stents     s/p vancomycin x1 2/20  s/p zosyn 2/20-2/22      Recommendations:   Continue off antibiotics   Vascular surgery following  HD per renal    Monitor temps/WBC  Continue rest of care per primary team   Stable from ID standpoint at this time.     Bridgette Ramirez M.D.  [facility]  Available on [employer] TEAMS - *PREFERRED*  [phone]  After 5pm on weekdays and all day on weekends - please call [phone]     Thank you for consulting us and involving us in the management of this patients case. In addition to reviewing history, imaging, documents, labs, microbiology, took into account antibiotic stewardship, local antibiogram and infection control strategies and potential transmission issues at time of treatment decision making process.

## 2025-03-12 NOTE — PROGRESS NOTE ADULT - SUBJECTIVE AND OBJECTIVE BOX
[facility]  SABINO Griffin Y. Patel, S. Shah, G. Casimir  [phone]  ([phone] - weekdays after 5pm and weekends)    Name: BRIAN DEMPSEY  Age/Gender: 74y Male  MRN: [id number]    Interval History:  Patient seen and examined this morning.   No new complaints noted.  Notes reviewed  No concerning overnight events  Afebrile   Allergies: No Known Allergies      Objective:  Vitals:   T(F): 97.8 (03-12-25 @ 08:32), Max: 98.7 (03-11-25 @ 15:42)  HR: 79 (03-12-25 @ 08:32) (77 - 90)  BP: 149/71 (03-12-25 @ 08:32) (112/74 - 156/78)  RR: 16 (03-12-25 @ 08:32) (16 - 18)  SpO2: 96% (03-12-25 @ 08:32) (96% - 97%)  Physical Examination:  General: no acute distress  HEENT: normocephalic, atraumatic  Respiratory: breathing comfortably  Cardiovascular: S1 and S2 present  Gastrointestinal: nondistended  Extremities: LE with ischemic changes    Laboratory Studies:  CBC:                       8.6    15.12 )-----------( 277      ( 12 Mar 2025 08:49 )             29.0     WBC Trend:  15.12 03-12-25 @ 08:49  12.90 03-11-25 @ 09:50  11.58 03-09-25 @ 07:09  12.94 03-08-25 @ 09:28  14.13 03-07-25 @ 08:57  12.14 03-06-25 @ 09:51  13.86 03-05-25 @ 10:21    CMP: 03-11    136  |  101  |  15  ----------------------------<  104[H]  3.9   |  26  |  2.78[H]    Ca    7.3[L]      11 Mar 2025 09:50      Creatinine: 2.78 mg/dL (03-11-25 @ 09:50)  Creatinine: 2.31 mg/dL (03-09-25 @ 07:09)  Creatinine: 2.90 mg/dL (03-07-25 @ 08:57)  Creatinine: 3.28 mg/dL (03-05-25 @ 10:21)    Microbiology: reviewed   Radiology: reviewed     Medications:  acetaminophen     Tablet .. 650 milliGRAM(s) Oral every 6 hours PRN  albuterol/ipratropium for Nebulization 3 milliLiter(s) Nebulizer every 6 hours  aspirin  chewable 81 milliGRAM(s) Oral daily  atorvastatin 40 milliGRAM(s) Oral at bedtime  bisacodyl 5 milliGRAM(s) Oral every 12 hours PRN  chlorhexidine 2% Cloths 1 Application(s) Topical daily  clopidogrel Tablet 75 milliGRAM(s) Oral daily  epoetin aleah-epbx (RETACRIT) Injectable 47628 Unit(s) IV Push <User Schedule>  guaiFENesin ER 1200 milliGRAM(s) Oral every 12 hours  hydrOXYzine hydrochloride 25 milliGRAM(s) Oral three times a day  ibuprofen  Tablet. 400 milliGRAM(s) Oral every 12 hours  oxyCODONE    IR 10 milliGRAM(s) Oral every 4 hours PRN  oxyCODONE  ER Tablet 30 milliGRAM(s) Oral every 12 hours  pantoprazole    Tablet 40 milliGRAM(s) Oral before breakfast  polyethylene glycol 3350 17 Gram(s) Oral daily  senna 2 Tablet(s) Oral at bedtime  sodium chloride 0.9% lock flush 10 milliLiter(s) IV Push every 1 hour PRN    Prior/Completed Antimicrobials:  piperacillin/tazobactam IVPB.  piperacillin/tazobactam IVPB.-  vancomycin  IVPB

## 2025-03-12 NOTE — PROGRESS NOTE ADULT - SUBJECTIVE AND OBJECTIVE BOX
Patient is a 74y old  Male who presents with a chief complaint of ESRD requiring HD, uremia (12 Mar 2025 16:30)       INTERVAL HPI/OVERNIGHT EVENTS:  Patient seen and evaluated at bedside.  Pt is resting comfortable in NAD. Relates pain to toes    Allergies    No Known Allergies    Intolerances        Vital Signs Last 24 Hrs  T(C): 36.6 (12 Mar 2025 11:32), Max: 36.6 (12 Mar 2025 07:37)  T(F): 97.8 (12 Mar 2025 11:32), Max: 97.8 (12 Mar 2025 07:37)  HR: 78 (12 Mar 2025 11:32) (77 - 80)  BP: 131/59 (12 Mar 2025 11:32) (131/59 - 156/78)  BP(mean): --  RR: 16 (12 Mar 2025 11:32) (16 - 18)  SpO2: 95% (12 Mar 2025 11:32) (95% - 97%)    Parameters below as of 12 Mar 2025 11:32  Patient On (Oxygen Delivery Method): room air        LABS:                        8.6    15.12 )-----------( 277      ( 12 Mar 2025 08:49 )             29.0     03-11    136  |  101  |  15  ----------------------------<  104[H]  3.9   |  26  |  2.78[H]    Ca    7.3[L]      11 Mar 2025 09:50        Urinalysis Basic - ( 11 Mar 2025 09:50 )    Color: x / Appearance: x / SG: x / pH: x  Gluc: 104 mg/dL / Ketone: x  / Bili: x / Urobili: x   Blood: x / Protein: x / Nitrite: x   Leuk Esterase: x / RBC: x / WBC x   Sq Epi: x / Non Sq Epi: x / Bacteria: x      CAPILLARY BLOOD GLUCOSE          Lower Extremity Physical Exam:  Vasular: DP/PT 0/4, B/L, CFT <4 seconds B/L, Temperature gradient  warm to cool, B/L.   Neuro: Epicritic sensation diminished to the level of digits B/L.  Skin: b/l digits 1-5, increased ischemic/ dusky changes, decreased warmth, No open ulcerations, no purulence, no fluctuance, no malodor, or clinical signs of infection., left posterior heel blister and partial thickness ulceration secondary to PVD, no signs of infection noted

## 2025-03-12 NOTE — PROGRESS NOTE ADULT - SUBJECTIVE AND OBJECTIVE BOX
Patient is a 74y old  Male who presents with a chief complaint of ESRD requiring HD, uremia (12 Mar 2025 16:22)      SUBJECTIVE / OVERNIGHT EVENTS: ptn is no longer constipated. doing well off prn IV DIlaudid    MEDICATIONS  (STANDING):  albuterol/ipratropium for Nebulization 3 milliLiter(s) Nebulizer every 6 hours  aspirin  chewable 81 milliGRAM(s) Oral daily  atorvastatin 40 milliGRAM(s) Oral at bedtime  chlorhexidine 2% Cloths 1 Application(s) Topical daily  clopidogrel Tablet 75 milliGRAM(s) Oral daily  epoetin aleah-epbx (RETACRIT) Injectable 45196 Unit(s) IV Push <User Schedule>  guaiFENesin ER 1200 milliGRAM(s) Oral every 12 hours  hydrOXYzine hydrochloride 25 milliGRAM(s) Oral three times a day  ibuprofen  Tablet. 400 milliGRAM(s) Oral every 12 hours  oxyCODONE  ER Tablet 30 milliGRAM(s) Oral every 12 hours  pantoprazole    Tablet 40 milliGRAM(s) Oral before breakfast  polyethylene glycol 3350 17 Gram(s) Oral daily  senna 2 Tablet(s) Oral at bedtime    MEDICATIONS  (PRN):  acetaminophen     Tablet .. 650 milliGRAM(s) Oral every 6 hours PRN Temp greater or equal to 38C (100.4F), Mild Pain (1 - 3)  bisacodyl 5 milliGRAM(s) Oral every 12 hours PRN Constipation  oxyCODONE    IR 10 milliGRAM(s) Oral every 4 hours PRN Moderate Pain (4 - 6)  sodium chloride 0.9% lock flush 10 milliLiter(s) IV Push every 1 hour PRN Pre/post blood products, medications, blood draw, and to maintain line patency      Vital Signs Last 24 Hrs  T(F): 97.8 (03-12-25 @ 11:32), Max: 97.8 (03-12-25 @ 07:37)  HR: 78 (03-12-25 @ 11:32) (77 - 80)  BP: 131/59 (03-12-25 @ 11:32) (131/59 - 156/78)  RR: 16 (03-12-25 @ 11:32) (16 - 18)  SpO2: 95% (03-12-25 @ 11:32) (95% - 97%)  Telemetry:   CAPILLARY BLOOD GLUCOSE        I&O's Summary    12 Mar 2025 07:01  -  12 Mar 2025 16:30  --------------------------------------------------------  IN: 0 mL / OUT: 350 mL / NET: -350 mL        PHYSICAL EXAM:  GENERAL: NAD, well-developed  HEAD:  Atraumatic, Normocephalic  EYES: EOMI, PERRLA, conjunctiva and sclera clear  NECK: Supple, No JVD  CHEST/LUNG: Clear to auscultation bilaterally; No wheeze  HEART: Regular rate and rhythm; No murmurs, rubs, or gallops  ABDOMEN: Soft, Nontender, Nondistended; Bowel sounds present  EXTREMITIES:  2+ Peripheral Pulses, No clubbing, cyanosis, or edema  PSYCH: AAOx3  NEUROLOGY: non-focal  SKIN: No rashes or lesions    LABS:                        8.6    15.12 )-----------( 277      ( 12 Mar 2025 08:49 )             29.0     03-11    136  |  101  |  15  ----------------------------<  104[H]  3.9   |  26  |  2.78[H]    Ca    7.3[L]      11 Mar 2025 09:50            Urinalysis Basic - ( 11 Mar 2025 09:50 )    Color: x / Appearance: x / SG: x / pH: x  Gluc: 104 mg/dL / Ketone: x  / Bili: x / Urobili: x   Blood: x / Protein: x / Nitrite: x   Leuk Esterase: x / RBC: x / WBC x   Sq Epi: x / Non Sq Epi: x / Bacteria: x        RADIOLOGY & ADDITIONAL TESTS:    Imaging Personally Reviewed:    Consultant(s) Notes Reviewed:      Care Discussed with Consultants/Other Providers:

## 2025-03-12 NOTE — PROGRESS NOTE ADULT - PROBLEM SELECTOR PLAN 3
-Neuro following  -ABG 2/20 acceptable  -Pt lethargic 2/22, ABG never sent but AMS appears to be improving   -ABG 2/28 with no co2 retention.    3/9: he seems pretty alert and awake and responsive appropriately to questions  /: resolved:   he is alert sand awke and responsive to questions well : needs pt ot    3/11 : seems to be doing  ok : no sob:  on room air  3/12: resolved

## 2025-03-12 NOTE — PROGRESS NOTE ADULT - SUBJECTIVE AND OBJECTIVE BOX
Neurology      S; patient seen. no neuro changes          Medications: MEDICATIONS  (STANDING):  albuterol/ipratropium for Nebulization 3 milliLiter(s) Nebulizer every 6 hours  aspirin  chewable 81 milliGRAM(s) Oral daily  atorvastatin 40 milliGRAM(s) Oral at bedtime  chlorhexidine 2% Cloths 1 Application(s) Topical daily  clopidogrel Tablet 75 milliGRAM(s) Oral daily  epoetin aleah-epbx (RETACRIT) Injectable 46747 Unit(s) IV Push <User Schedule>  guaiFENesin ER 1200 milliGRAM(s) Oral every 12 hours  hydrOXYzine hydrochloride 25 milliGRAM(s) Oral three times a day  ibuprofen  Tablet. 400 milliGRAM(s) Oral every 12 hours  oxyCODONE  ER Tablet 30 milliGRAM(s) Oral every 12 hours  pantoprazole    Tablet 40 milliGRAM(s) Oral before breakfast  polyethylene glycol 3350 17 Gram(s) Oral daily  senna 2 Tablet(s) Oral at bedtime    MEDICATIONS  (PRN):  acetaminophen     Tablet .. 650 milliGRAM(s) Oral every 6 hours PRN Temp greater or equal to 38C (100.4F), Mild Pain (1 - 3)  bisacodyl 5 milliGRAM(s) Oral every 12 hours PRN Constipation  oxyCODONE    IR 10 milliGRAM(s) Oral every 4 hours PRN Moderate Pain (4 - 6)  sodium chloride 0.9% lock flush 10 milliLiter(s) IV Push every 1 hour PRN Pre/post blood products, medications, blood draw, and to maintain line patency       Vitals:  Vital Signs Last 24 Hrs  T(C): 36.6 (12 Mar 2025 08:32), Max: 37.1 (11 Mar 2025 15:42)  T(F): 97.8 (12 Mar 2025 08:32), Max: 98.7 (11 Mar 2025 15:42)  HR: 79 (12 Mar 2025 08:32) (77 - 90)  BP: 149/71 (12 Mar 2025 08:32) (112/74 - 156/78)  BP(mean): --  RR: 16 (12 Mar 2025 08:32) (16 - 18)  SpO2: 96% (12 Mar 2025 08:32) (96% - 97%)    Parameters below as of 12 Mar 2025 08:32  Patient On (Oxygen Delivery Method): room air                            General Appearance: Appropriately dressed and in no acute distress       Head: Normocephalic, atraumatic and no dysmorphic features  Ear, Nose, and Throat: Moist mucous membranes  CVS: S1S2+  Resp: No SOB, no wheeze or rhonchi  GI: soft NT/ND  Extremities: LE PVD : dusky toes noted   Skin: + decub      Neurological Exam:  Mental Status: Awake, alert and oriented x 2.  Able to follow simple and complex verbal commands. Able to name and repeat. fluent speech. No obvious aphasia or dysarthria noted.   Cranial Nerves: PERRL, EOMI, VFFC, sensation V1-V3 intact,  no obvious facial asymmetry, equal elevation of palate, scm/trap 5/5, tongue is midline on protrusion. no obvious papilledema on fundoscopic exam. hearing is grossly intact.   Motor: Normal bulk, tone and strength throughout uppers 4/ lowers 0-/1 5   Sensation: Intact to light touch and pinprick throughout.     Coordination: No dysmetria on FNF   Gait: deferred, wheelchair bound     Data/Labs/Imaging which I personally reviewed.        LABS:                          8.6    15.12 )-----------( 277      ( 12 Mar 2025 08:49 )             29.0     03-11    136  |  101  |  15  ----------------------------<  104[H]  3.9   |  26  |  2.78[H]    Ca    7.3[L]      11 Mar 2025 09:50          Urinalysis Basic - ( 11 Mar 2025 09:50 )    Color: x / Appearance: x / SG: x / pH: x  Gluc: 104 mg/dL / Ketone: x  / Bili: x / Urobili: x   Blood: x / Protein: x / Nitrite: x   Leuk Esterase: x / RBC: x / WBC x   Sq Epi: x / Non Sq Epi: x / Bacteria: x              < from: CT Head No Cont (02.20.25 @ 18:47) >    ACC: [id number] EXAM:  CT BRAIN   ORDERED BY: GUY DE LA CRUZ     PROCEDURE DATE:  02/20/2025          INTERPRETATION:  CLINICAL INDICATION: Altered mental status    TECHNIQUE: Axial CT scanning of the brain was obtained from the skull   base to the vertex without the administration of intravenous contrast.   Reformatted coronal and sagittal images were subsequently obtained and   reviewed.    COMPARISON: None    FINDINGS:  There is no CT evidence of acute transcortical infarct. Age-related   involutional changes and chronic microvascular ischemic changes.    There is no hydrocephalus, mass effect, or acute intracranial hemorrhage.   No extra-axial collection. Basal cisterns are patent.    The visualized paranasal sinuses and mastoid air cells are clear.    The calvarium is intact.    IMPRESSION:  No evidence of acute transcortical infarct, acute intracranial   hemorrhage, or mass effect.    --- End of Report ---            CORTNEY REARDON MD; Attending Radiologist  This document has been electronically signed. Feb 20 2025  7:07PM    < end of copied text >

## 2025-03-12 NOTE — PROGRESS NOTE ADULT - PROBLEM SELECTOR PLAN 1
-CXR with elevated R hemidiaphragm (not present on CXR in December 2024)  -Low grade temp, cough  -CT chest with LLL, RLL GGO, mucoid impacted airways. No clear PNA. Monitoring off ABX per ID  -Suggest Duoneb q6h, Mucinex 1200 mg PO BID   -S/p Mucomyst x5 days   -Incentive spirometry   -Keep sats >90% with o2 PRN  -ABG 2/28 with no co2 retention  -CXR 3/1 with improved R hemidiaphragm, low lung volumes on L. Otherwise grossly clear.  3/9: seems pretty good toay  :   no sob:  on room air:  pain is controlled:  encouraged IS  /: pulm wise he looks pretty good:   no sob:   no cugh :   no phlegm  3/11: seems to be doing  ok : n o sob: on room air:   no  new resp issues;  3/12: seems pretty good:  no resp distress/; on room air;  ct scan chest again reviewed:  he would need repeat ct scan chest  in  3 months time after dc

## 2025-03-12 NOTE — PROGRESS NOTE ADULT - PROBLEM SELECTOR PLAN 6
-Monitor leukocytosis/fever curve  -CXR as above, no clear infiltrate  -ID f/u.  3/9: WBC slowly downtrending  3/10: con tto downtrend  3/11: wbc is slightly high o fver;  3/12: wbc increased further   no fever:

## 2025-03-13 NOTE — PROGRESS NOTE ADULT - ASSESSMENT
74 year old male with CKD, sp polio, paraplegia with associated neurogenic bladder s/p suprapubic catheter who was admitted s/p fall on 2/16.   CKD - ESRD, now s/p DEYA boston 2/17, on HD  ruled out cellulitis around suprapubic cath  2/20 s/p RRT for tremors and confusion -- pt with chronic tremors although notably worse  hyponatremia    mrsa negative   2/20 CXR with clear lungs   SPC site with chronic/stable inflammatory changes, no drainage - no sign of SSTI  CTA abd with occlusion of b/l external iliac arteries and b/l superficial femoral arteries with distal reconstitution at popliteal arteries; patent three vessel runoff of RLE with 2 vessel runoff of LLE with occlusion of L mid anterior tibial artery with distal reconstitution  Bcx negative to date   mental status at baseline  WBC noted, remains afebrile   Vascular following for worsening PAD with worsening ischemic changes   -3/3 s/p RLE angiogram b/l iliac artery stents     s/p vancomycin x1 2/20  s/p zosyn 2/20-2/22      Recommendations:   Continue off antibiotics   Vascular surgery following  HD per renal    Monitor temps/WBC  Continue rest of care per primary team   Stable from ID standpoint at this time.     Bridgette Ramirez M.D.  [facility]  Available on [employer] TEAMS - *PREFERRED*  [phone]  After 5pm on weekdays and all day on weekends - please call [phone]     Thank you for consulting us and involving us in the management of this patients case. In addition to reviewing history, imaging, documents, labs, microbiology, took into account antibiotic stewardship, local antibiogram and infection control strategies and potential transmission issues at time of treatment decision making process.

## 2025-03-13 NOTE — PROGRESS NOTE ADULT - PROBLEM SELECTOR PLAN 1
-CXR with elevated R hemidiaphragm (not present on CXR in December 2024)  -Low grade temp, cough  -CT chest with LLL, RLL GGO, mucoid impacted airways. No clear PNA. Monitoring off ABX per ID  -Suggest Duoneb q6h, Mucinex 1200 mg PO BID   -S/p Mucomyst x5 days   -Incentive spirometry   -Keep sats >90% with o2 PRN  -ABG 2/28 with no co2 retention  -CXR 3/1 with improved R hemidiaphragm, low lung volumes on L. Otherwise grossly clear.  3/9: seems pretty good today  :   no sob:  on room air:  pain is controlled:  encouraged IS  /: pulm wise he looks pretty good:   no sob:   no cugh :   no phlegm  3/11: seems to be doing  ok : n o sob: on room air:   no  new resp issues;  3/12: seems pretty good:  no resp distress/; on room air;  ct scan chest again reviewed:  he would need repeat ct scan chest  in  3 months time after dc  3/13: he feels OK:  resp wise:  cont IS:  no sob:  needs to co me oob to chair:   has elevated rt HD :  no wheezing

## 2025-03-13 NOTE — PROGRESS NOTE ADULT - PROBLEM SELECTOR PLAN 4
-Per vascular  -S/p b/l iliac stent placement on 3/3/25  -Podiatry f/u.  3/9: has cyanosed digits:  defer to vascular  /: vascular following  3/123: per vascular surgery  3/13: still with discolored digits:  defer to vascular

## 2025-03-13 NOTE — PROGRESS NOTE ADULT - PROBLEM SELECTOR PLAN 6
-Monitor leukocytosis/fever curve  -CXR as above, no clear infiltrate  -ID f/u.  3/9: WBC slowly downtrending  3/10: con tto downtrend  3/11: wbc is slightly high o fver;  3/12: wbc increased further   no fever:  3/13: WBC spontaneously downtrended today

## 2025-03-13 NOTE — PROGRESS NOTE ADULT - ASSESSMENT
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  came in after fall and for HD.     2/20 CTH neg   \Na 123 --> now 133   A1c 5.7   TSH WNL 3.46   o/e 2/21 AAOx2, uppers 4/5, lowers limited .  2/23; family bedside upset that he has pain in leg after he was moved.  patient going for imaging now.   s/p R IJ permacath by IR 2/25 2/27 AAOx3   sopoke with family bedisde 3/12 mental status baseline     Imrpssion  1) AMS, waxing and waing , likely multifactorial from infection, metabolic/electrolyte derrangements/ delerium / medication effect , ureemia which is imporving   2) polio  3) fall 2/2 weakness  4) hyponatremia to 123 2/20  improved 133 -->136     - AVF outpatient     f/u vascular; no vascluar options ; f/u with Messi from vasculare to schedule outpatient procedure   - on DAPT   - was getting oxycodone ER 30mg BID and oxy PRN IR i10 and dilauded PRN ;   - f/u psych   - limit sedating meds   - continue to monitor and correct metabolic derrangements .  - delerium precuations.   - frequent re orientation  - check b12, RPR, ammonia level   - will consdier MRI brain or EEG if doesn't improve but seems to have been improving   - PT/OT   - check FS, glucose control <180  - GI/DVT ppx  - Thank you for allowing me to participate in the care of this patient. Call with questions.    spoke with wife 3/12  dc planning [facility] needs auth   Paul Limon MD  Vascular Neurology  Office: [phone]

## 2025-03-13 NOTE — PROGRESS NOTE ADULT - SUBJECTIVE AND OBJECTIVE BOX
Overnight events noted      VITAL:  T(C): , Max: 36.8 (03-13-25 @ 00:35)  T(F): , Max: 98.3 (03-13-25 @ 08:30)  HR: 78 (03-13-25 @ 08:30)  BP: 150/77 (03-13-25 @ 08:30)  BP(mean): --  RR: 14 (03-13-25 @ 08:30)  SpO2: 99% (03-13-25 @ 08:30)  Wt(kg): --      PHYSICAL EXAM:  Constitutional: alert, NAD  HEENT: NCAT, DMM  Neck: Supple, No JVD  Respiratory: CTA-b/l  Cardiovascular: RRR s1s2, no m/r/g  Gastrointestinal: BS+, soft, NT/ND  : (+)suprapubic cath  Extremities: No peripheral edema b/l  Neurological: reduced generalized strength  Back: no CVAT b/l  Skin: (+)cyanotic changes b/l feet  Access: RIJ tunneled cath    LABS:                        8.7    12.93 )-----------( 263      ( 13 Mar 2025 09:58 )             29.7     Na(136)/K(3.9)/Cl(101)/HCO3(26)/BUN(15)/Cr(2.78)Glu(104)/Ca(7.3)/Mg(--)/PO4(--)    03-11 @ 09:50      IMPRESSION: 74M w/ polio, neurogenic bladder/suprapubic catheter, 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 Last dialyzed yesterday; due for next HD tomorrow  (2)Hypophosphatemia - now off PO4 binders and off dietary PO4 restriction    (3)Anemia - s/p IV iron; on Retacrit with HD    (4)PAD - s/p LE bypass 3/3/25 - cyanotic changes of toes b/l - for further management after discharge    (5) - s/p suprapubic cath change 3/10/25    (6)Constipation - improved      RECOMMEND:   (1)D/C planning per primary team  (2)Next HD tomorrow - inpatient vs outpatient            Rafita Denny MD  [facility]  Office/on call physician: [phone]  Cell (7a-7l): [phone]       no pain, no sob      VITAL:  T(C): , Max: 36.8 (03-13-25 @ 00:35)  T(F): , Max: 98.3 (03-13-25 @ 08:30)  HR: 78 (03-13-25 @ 08:30)  BP: 150/77 (03-13-25 @ 08:30)  BP(mean): --  RR: 14 (03-13-25 @ 08:30)  SpO2: 99% (03-13-25 @ 08:30)  Wt(kg): --      PHYSICAL EXAM:  Constitutional: alert, NAD, in good spirits  HEENT: NCAT, DMM  Neck: Supple, No JVD  Respiratory: CTA-b/l  Cardiovascular: RRR s1s2, no m/r/g  Gastrointestinal: BS+, soft, NT/ND  : (+)suprapubic cath  Extremities: No peripheral edema b/l  Neurological: reduced generalized strength  Back: no CVAT b/l  Skin: (+)cyanotic changes b/l feet  Access: RIJ tunneled cath    LABS:                        8.7    12.93 )-----------( 263      ( 13 Mar 2025 09:58 )             29.7     Na(136)/K(3.9)/Cl(101)/HCO3(26)/BUN(15)/Cr(2.78)Glu(104)/Ca(7.3)/Mg(--)/PO4(--)    03-11 @ 09:50      IMPRESSION: 74M w/ polio, neurogenic bladder/suprapubic catheter, 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 Last dialyzed yesterday; due for next HD tomorrow    (2)Hypophosphatemia - now off PO4 binders and off dietary PO4 restriction    (3)Anemia - s/p IV iron; on Retacrit with HD    (4)PAD - s/p LE bypass 3/3/25 - cyanotic changes of toes b/l - for further management after discharge    (5) - s/p suprapubic cath change 3/10/25    (6)CV - mildly hypertensive/starting to accumulate fluid - we should attempt some level of UF with HD from this point forward      RECOMMEND:   (1)D/C planning per primary team  (2)Next HD tomorrow - inpatient vs outpatient - will attempt 0.7kg UF            Rafita Denny MD  [facility]  Office/on call physician: [phone]  Cell (7a-7p): [phone]

## 2025-03-13 NOTE — PROGRESS NOTE ADULT - SUBJECTIVE AND OBJECTIVE BOX
[facility]  SABINO Griffin Y. Patel, S. Shah, G. Casimir  [phone]  ([phone] - weekdays after 5pm and weekends)    Name: BRIAN DEMPSEY  Age/Gender: 74y Male  MRN: [id number]    Interval History:  Patient seen and examined this morning.   No new complaints noted.  Notes reviewed  No concerning overnight events  Afebrile   Allergies: No Known Allergies      Objective:  Vitals:   T(F): 98.3 (03-13-25 @ 08:30), Max: 98.3 (03-13-25 @ 08:30)  HR: 78 (03-13-25 @ 08:30) (77 - 88)  BP: 150/77 (03-13-25 @ 08:30) (129/57 - 150/77)  RR: 14 (03-13-25 @ 08:30) (14 - 16)  SpO2: 99% (03-13-25 @ 08:30) (95% - 99%)  Physical Examination:  General: no acute distress  HEENT: normocephalic, atraumatic  Respiratory: breathing comfortably  Cardiovascular: S1 and S2 present  Gastrointestinal: nondistended  Extremities: LE with ischemic changes    Laboratory Studies:  CBC:                       8.6    15.12 )-----------( 277      ( 12 Mar 2025 08:49 )             29.0     WBC Trend:  15.12 03-12-25 @ 08:49  12.90 03-11-25 @ 09:50  11.58 03-09-25 @ 07:09  12.94 03-08-25 @ 09:28  14.13 03-07-25 @ 08:57  12.14 03-06-25 @ 09:51    CMP: 03-11    136  |  101  |  15  ----------------------------<  104[H]  3.9   |  26  |  2.78[H]    Ca    7.3[L]      11 Mar 2025 09:50      Creatinine: 2.78 mg/dL (03-11-25 @ 09:50)  Creatinine: 2.31 mg/dL (03-09-25 @ 07:09)  Creatinine: 2.90 mg/dL (03-07-25 @ 08:57)    Microbiology: reviewed   Radiology: reviewed     Medications:  acetaminophen     Tablet .. 650 milliGRAM(s) Oral every 6 hours PRN  albuterol/ipratropium for Nebulization 3 milliLiter(s) Nebulizer every 6 hours  aspirin  chewable 81 milliGRAM(s) Oral daily  atorvastatin 40 milliGRAM(s) Oral at bedtime  benzocaine/menthol Lozenge 1 Lozenge Oral three times a day PRN  bisacodyl 5 milliGRAM(s) Oral every 12 hours PRN  chlorhexidine 2% Cloths 1 Application(s) Topical daily  clopidogrel Tablet 75 milliGRAM(s) Oral daily  epoetin aleah-epbx (RETACRIT) Injectable 86373 Unit(s) IV Push <User Schedule>  guaiFENesin ER 1200 milliGRAM(s) Oral every 12 hours  hydrOXYzine hydrochloride 25 milliGRAM(s) Oral three times a day  ibuprofen  Tablet. 400 milliGRAM(s) Oral every 12 hours  mupirocin 2% Ointment 1 Application(s) Topical <User Schedule>  oxyCODONE    IR 10 milliGRAM(s) Oral every 4 hours PRN  oxyCODONE  ER Tablet 30 milliGRAM(s) Oral every 12 hours  pantoprazole    Tablet 40 milliGRAM(s) Oral before breakfast  polyethylene glycol 3350 17 Gram(s) Oral daily  senna 2 Tablet(s) Oral at bedtime  sodium chloride 0.9% lock flush 10 milliLiter(s) IV Push every 1 hour PRN    Prior/Completed Antimicrobials:  piperacillin/tazobactam IVPB.  piperacillin/tazobactam IVPB.-  vancomycin  IVPB

## 2025-03-13 NOTE — PROGRESS NOTE ADULT - SUBJECTIVE AND OBJECTIVE BOX
Date of Service: 03-13-25 @ 15:42    Patient is a 74y old  Male who presents with a chief complaint of ESRD requiring HD, uremia (13 Mar 2025 13:41)      Any change in ROS: ptis doing great  resp wise  :  no sob:  resp wise  remains on room air   no cough     MEDICATIONS  (STANDING):  albuterol/ipratropium for Nebulization 3 milliLiter(s) Nebulizer every 6 hours  aspirin  chewable 81 milliGRAM(s) Oral daily  atorvastatin 40 milliGRAM(s) Oral at bedtime  chlorhexidine 2% Cloths 1 Application(s) Topical daily  clopidogrel Tablet 75 milliGRAM(s) Oral daily  epoetin aleah-epbx (RETACRIT) Injectable 73184 Unit(s) IV Push <User Schedule>  guaiFENesin ER 1200 milliGRAM(s) Oral every 12 hours  hydrOXYzine hydrochloride 25 milliGRAM(s) Oral three times a day  ibuprofen  Tablet. 400 milliGRAM(s) Oral every 12 hours  mupirocin 2% Ointment 1 Application(s) Topical <User Schedule>  oxyCODONE  ER Tablet 30 milliGRAM(s) Oral every 12 hours  pantoprazole    Tablet 40 milliGRAM(s) Oral before breakfast  polyethylene glycol 3350 17 Gram(s) Oral daily  senna 2 Tablet(s) Oral at bedtime    MEDICATIONS  (PRN):  acetaminophen     Tablet .. 650 milliGRAM(s) Oral every 6 hours PRN Temp greater or equal to 38C (100.4F), Mild Pain (1 - 3)  benzocaine/menthol Lozenge 1 Lozenge Oral three times a day PRN Sore Throat  bisacodyl 5 milliGRAM(s) Oral every 12 hours PRN Constipation  oxyCODONE    IR 10 milliGRAM(s) Oral every 4 hours PRN Moderate Pain (4 - 6)  sodium chloride 0.9% lock flush 10 milliLiter(s) IV Push every 1 hour PRN Pre/post blood products, medications, blood draw, and to maintain line patency    Vital Signs Last 24 Hrs  T(C): 36.8 (13 Mar 2025 08:30), Max: 36.8 (13 Mar 2025 00:35)  T(F): 98.3 (13 Mar 2025 08:30), Max: 98.3 (13 Mar 2025 08:30)  HR: 78 (13 Mar 2025 08:30) (77 - 78)  BP: 150/77 (13 Mar 2025 08:30) (129/57 - 150/77)  BP(mean): --  RR: 14 (13 Mar 2025 08:30) (14 - 16)  SpO2: 99% (13 Mar 2025 08:30) (98% - 99%)    Parameters below as of 13 Mar 2025 08:30  Patient On (Oxygen Delivery Method): room air        I&O's Summary    12 Mar 2025 07:01  -  13 Mar 2025 07:00  --------------------------------------------------------  IN: 0 mL / OUT: 800 mL / NET: -800 mL          Physical Exam:   GENERAL: NAD, well-groomed, well-developed  HEENT: VINNY/   Atraumatic, Normocephalic  ENMT: No tonsillar erythema, exudates, or enlargement; Moist mucous membranes, Good dentition, No lesions  NECK: Supple, No JVD, Normal thyroid  CHEST/LUNG: Clear to auscultaion,  CVS: Regular rate and rhythm; No murmurs, rubs, or gallops  GI: : Soft, Nontender, Nondistended; Bowel sounds present  NERVOUS SYSTEM:  Alert & Oriented X3  EXTREMITIES:  + edema : discolored digits   LYMPH: No lymphadenopathy noted  SKIN: No rashes or lesions  ENDOCRINOLOGY: No Thyromegaly  PSYCH: Appropriate    Labs:                              8.7    12.93 )-----------( 263      ( 13 Mar 2025 09:58 )             29.7                         8.6    15.12 )-----------( 277      ( 12 Mar 2025 08:49 )             29.0                         8.9    12.90 )-----------( 268      ( 11 Mar 2025 09:50 )             30.4     03-11    136  |  101  |  15  ----------------------------<  104[H]  3.9   |  26  |  2.78[H]        CAPILLARY BLOOD GLUCOSE    rad< from: Xray Chest 1 View- PORTABLE-Urgent (Xray Chest 1 View- PORTABLE-Urgent .) (03.01.25 @ 22:48) >    ACC: [id number] EXAM:  XR CHEST PORTABLE URGENT 1V   ORDERED BY: DELPHINE BARNETT     PROCEDURE DATE:  03/01/2025          INTERPRETATION:  EXAMINATION: XR CHEST URGENT    CLINICAL INDICATION: cough    TECHNIQUE: Single frontal, portable view of the chest was obtained.    COMPARISON: Chest x-ray 2/20/2025.    FINDINGS:  Right-sided tunneled hemodialysis catheter terminates in the SVC.  The heart is not accurately assessed in this AP projection.  The lungs are clear.  There is no pneumothorax or pleural effusion.  No acute bony abnormality.    IMPRESSION:  Clear lungs.    --- End of Report ---          GILBERT LEON MD; Resident Radiologist  This document has been electronically signed.   JESUS HALEY DO; Attending Interventional Radiologist  This document has been electronically signed. Mar  2 2025  9:37AM    < end of copied text >                    RECENT CULTURES:        RESPIRATORY CULTURES:          Studies  Chest X-RAY  CT SCAN Chest   Venous Dopplers: LE:   CT Abdomen  Others

## 2025-03-13 NOTE — PROGRESS NOTE ADULT - ASSESSMENT
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 The initial plan was that he would present to the [facility] ER Monday 2/17 for HD initiation. However, day of admission, he fell and sustained trauma to his knee. Called to consult for cough, elevated R hemidiaphragm.

## 2025-03-13 NOTE — PROGRESS NOTE ADULT - ASSESSMENT
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 He is well known to me from multiple recent admisions  s/p admissions at [facility] 12/20-12/25/24 and 2/4-2/7 with SONDRA on CKD with associated uremic symptoms.   At the last admission he had expressed strong interest to try to hold off with HD intiation but he has been getting worse clinically at home.  His SONDRA and uremic symptoms significantly improved after the first admission; they improved a to mild extent during the 2nd admission to the point where he was able to be discharged without HD. Since then, however, he has worsened further.   He has been suffering from progressively worsening diffuse pruritus, loss of appetite, and generalized weakness and falls.   His nephrologist and PCP has been speaking with him and his family over the past few days,   The initial plan was that he would present to the [facility] ER Monday 2/17 (ie tomorrow) for HD initiation. Today, however, he fell and sustained trauma to his knee. Given the fall and concern for knee fracture, he presented to the ER today. multiple xrays show no Fractures.      CKD5, uremia, requiring initiation of HD  Rash, seborrheic dermatitis  Pruritis  Anemia  PAD  SP catheter, chronic  Left inferior pole acute on chronic patella Fx    plan  - HD via R subclavian permacath  -3/13: ptn is doing well. ptn has an accepting rehab facility, now pending AUTH.   -3/12: ptn is no longer constipated. doing well off prn IV DIlaudid. awaiting dc to [facility]  -3/11: ptn has no new c/o other than feeling constipated, lactulose ordered. also in preparation for [facility] will dc prn IV DIlaudid, cont prn OXy IR  -3/10:  ptn is awake, alert, wife at bedside, i instructed them to give rehab choices. also awaiting SPC change to be done by urology. pain is controlled  -3/9: seen by PT, will refer to [facility]. choices to be given in AM. this was d/w ptn, daughter, wife.   -3/8:  ptn didnt want to get PT eval done, will reorder. ptn needs [facility] placement. PMNR consult ordered  - 3/7: ptn is alert , pain is controlled, DC planning d/w ptn and HCP, daughter Yanique  -3/6:  ptn is awake, alert, ecchymotic feet look improved, pain is controlled. DC planning to [facility]  3/4-5 - s/p b/l iliac arteries angioplasty and stent placements on 3/3. today has new ecchymoses in b/l LE, concern for arterial insufficiency. vascular aware.. wound from Left knee immobilizer is dressed and healing. pain is controlled.   LEFT UE AVF placement/scheduling will be on outptn basis as per vascular. dc planning to [facility]  - 3/3: ptn is s/p angiogram RLE : b/l iliac arteries w successful angioplasty and stents. in PACU. awaitng HD.  ptn has a pressure wound from the LLE immobilizer posteriorly proximal to the knee. its been on 2/2 knee fracture, applied by ortho and managed by ptn's wife as per ptn's and wife's request , this was d/w nursing and nurse manager ms Ruiz.. immobilizer should be managed by orthopedics and nursing. will keep it off, only keep on when repositioning for care and then remove. wound care to F/U . this was discussed at length w daughter Yanique and wife Dee Dee.   -3/1-3/2: ptn is smiling, pain free, had HD 2/28 and 3/1, awaiting RLE angiogram 3/3, on Heparin drip, euvolemic  -2/28: ptn is lethargic, awaiting RLE angiogram possible fem-fem bypass hopefully on 3/3 pending OR availability, awaiting HD today    -2/27:  plan for angiogram in am with possible angioplasty, possible stent, possible fem-fem bypass. medically cleared for angiogram and possible surgery, stent, angioplasty. pain is controlled. remains on heparin drip as per vascular. HD as per renal    -2/26:  ptn is sleeping, pain is controlled, he was seen by wound care and vascular attending. planning on angiogram 2/2 severe PAD in LE on CTA. he also spoke to HCP. ptn and HCP in agreement. ptn was started on HEPARIN drip as per vascular recs. RIJ permacath done 2/25    - 2/25: ptn states he is hallucinating, but not at present, his MS is at baseline, his pain is controlled, he still needs prn pain meds when he is moved in the bed or for testing or for HD. awaiting Permacath, AVF creation, LE bypass to be d/w Dr. Swenson and the ptn tomorrow. ptn has cardiology clearance  if he opts for. Ptn has sacral decubiti and posterior thigh and buttock decibiti and b/l heel decubiti. Z flow bootie d/w RN, get wound care consult    -2/24: pain is controlled  if the LLE is immobile and fully extended. doesn't tolerate the knee immobilizer. has a medium condyle and acute on chronic patella fx in LEFT knee. as per vascular ptn will need iliac stent  w a fem-fem bypass, possible axillo-femoral bypass. for this surgery he would need cardiac work up . more pressing surgery is LUE AVF creation,  in IR will arrange for Permacath. for pain control: Motrin 400 mg q12H, Oxy ER 30 mg q12H, Oxy IR 10 for mod pain and dilaudid 2 mg iv for severe pain. still has pruritis though improved, will raise atarax 25 mg to tid. plan of care d/w daughter Norma Persaud , ptn and his wife. Also d/w renal, card, vascular, ACP    -2/23: Daughter Yanique is the HCP, she and the ptn filled out the paperwork. daily plan and findings d/w her and the ptn. MS is at abseline, c/o b/l LE foot pain and Left Knee pain. xrays of left knee: cannot r/o acute on chronic inferior pole patellar Fx. knee immobilizer is on, awaiting ortho consult. doubt needs any intervention awaiting Ct chest today, will add CT Left knee. ptn states itching has recurred, severe pain has recurred. will resume Atatrax ( 25 mg bid) and Oxycodone ER , but at a lower dose 15 mg q12H. cont prn analgesics. HD as per renal, awaiting Vascular consult f/u re CTA A/L &LE and recs. ptn also wants AVF surgery on this admission. Coughing has stopped    - 2/22: ptn is drowsy but arousable, calmer today, answers questions appropriately. he is pain free, he stopped coughing, he denies having pruritis: will DC:  OXY ER, Atarax, Tessalon perles.     -  2/21: ptn is tearful, a bit confused, coughing, recognizes me and family at bedside. GOC d/w daughter and wife. AMS prob delirium 2/2 acute illness +/- opiods, lyrica, atarac. will lower atarak to tid, dc lyrica, cont Oxy 2/2 ptn has severe LE pain. neuro called for eval. Head ct done yesterday w no acute findings. CTA A/P w LE run fof: severe PAD, vascular to follow up on plan fo care. plan for HD tomorrow and next week place on tiw schedule of MWF. will need tunneled catheter prior to DC and will d/w vascular scheduling of AVF. ptn wants all to be done while inptn. daughter wants to be HCP as per ptn's wishes. she was given paperwork to get it signed and witnessed and will present to nursing thereafter. details of findings and complaints and plan of care d/w daughter and wife. spent 60 min. RVP ordered. start mucinex , tessalon perles, hunter, get CT chest to r/o PNA. pulm called    -  2/20:  ptn had RRT 2/2 AMS and tremors. no SZ, no LOC. noted to have Na 123( hyponatremia), given 500 cc NS. Gabapentin DCed. ptn had been taking gabapentin at home PTA. Pain is controlled. Pruritis resolved, tolerating HD otherwise.     - Pain management:  cont Oxycodone 10 IR q6H,  DIlaudid prn severe pain 2 mg q4H prn.   - cont outptn meds  - DVT ppx w [facility]SC

## 2025-03-13 NOTE — PROGRESS NOTE ADULT - SUBJECTIVE AND OBJECTIVE BOX
Subjective: Patient seen and examined. No new events except as noted.   Feels ok    REVIEW OF SYSTEMS:    CONSTITUTIONAL: +weakness, fevers or chills  EYES/ENT: No visual changes;  No vertigo or throat pain   NECK: No pain or stiffness  RESPIRATORY: No cough, wheezing, hemoptysis; No shortness of breath  CARDIOVASCULAR: No chest pain or palpitations  GASTROINTESTINAL: No abdominal or epigastric pain. No nausea, vomiting, or hematemesis; No diarrhea or constipation. No melena or hematochezia.  GENITOURINARY: No dysuria, frequency or hematuria  NEUROLOGICAL: No numbness or weakness  SKIN: No itching, burning, rashes, or lesions   All other review of systems is negative unless indicated above.    MEDICATIONS:  MEDICATIONS  (STANDING):  albuterol/ipratropium for Nebulization 3 milliLiter(s) Nebulizer every 6 hours  aspirin  chewable 81 milliGRAM(s) Oral daily  atorvastatin 40 milliGRAM(s) Oral at bedtime  chlorhexidine 2% Cloths 1 Application(s) Topical daily  clopidogrel Tablet 75 milliGRAM(s) Oral daily  epoetin aleah-epbx (RETACRIT) Injectable 94382 Unit(s) IV Push <User Schedule>  guaiFENesin ER 1200 milliGRAM(s) Oral every 12 hours  hydrOXYzine hydrochloride 25 milliGRAM(s) Oral three times a day  ibuprofen  Tablet. 400 milliGRAM(s) Oral every 12 hours  mupirocin 2% Ointment 1 Application(s) Topical <User Schedule>  oxyCODONE  ER Tablet 30 milliGRAM(s) Oral every 12 hours  pantoprazole    Tablet 40 milliGRAM(s) Oral before breakfast  polyethylene glycol 3350 17 Gram(s) Oral daily  senna 2 Tablet(s) Oral at bedtime      PHYSICAL EXAM:  T(C): 36.8 (03-13-25 @ 08:30), Max: 36.8 (03-13-25 @ 00:35)  HR: 78 (03-13-25 @ 08:30) (77 - 88)  BP: 150/77 (03-13-25 @ 08:30) (129/57 - 150/77)  RR: 14 (03-13-25 @ 08:30) (14 - 16)  SpO2: 99% (03-13-25 @ 08:30) (95% - 99%)  Wt(kg): --  I&O's Summary    12 Mar 2025 07:01  -  13 Mar 2025 07:00  --------------------------------------------------------  IN: 0 mL / OUT: 800 mL / NET: -800 mL          Appearance: NAD  HEENT:  Dry oral mucosa, PERRL, EOMI	  Lymphatic: No lymphadenopathy  Cardiovascular: Normal S1 S2, No JVD, No murmurs, No edema  Respiratory: Lungs clear to auscultation	  Psychiatry: A & O x 3, Mood & affect appropriate  Skin: No rashes, No ecchymoses, No cyanosis	  Neurologic: Non-focal  GI/Abd: Soft, obese, nontender. Dressing to LLQ C/D/I. Suprapubic catheter in place  Ext: Palp femoral pulses bilat. BLE atrophic, minimal dorsi/plantarflexion bilaterally. Sensation grossly intact. LLE edematous compared to R, erythema to right toes/forefoot. mottling of right toes/forefoot/heel  LLE:  small superficial abrasion, +edema and +ecchymosis over L knee  +TTP over L knee, no TTP along remainder of extremity; compartments soft  Limited ROM at knee 2/2 pain  No gross varus/valgus laxity, but assessment limited 2/2 pain  Motor: TA/EHL/GS/FHL intact  Sensory: DP/SP/Tib/Jordan/Saph SILT  +DP pulse (symmetric relative to contralateral side), WWP   pressure wound from the LLE immobilizer posteriorly proximal to the knee.  Vascular: Peripheral pulses palpable 2+ bilaterally      LABS:    CARDIAC MARKERS:                                8.7    12.93 )-----------( 263      ( 13 Mar 2025 09:58 )             29.7           proBNP:   Lipid Profile:   HgA1c:   TSH:             TELEMETRY: 	    ECG:  	  RADIOLOGY:   DIAGNOSTIC TESTING:  [ ] Echocardiogram:  [ ]  Catheterization:  [ ] Stress Test:    OTHER:

## 2025-03-13 NOTE — PROGRESS NOTE ADULT - SUBJECTIVE AND OBJECTIVE BOX
Neurology      S; patient seen. no neuro changes           Medications: MEDICATIONS  (STANDING):  albuterol/ipratropium for Nebulization 3 milliLiter(s) Nebulizer every 6 hours  aspirin  chewable 81 milliGRAM(s) Oral daily  atorvastatin 40 milliGRAM(s) Oral at bedtime  chlorhexidine 2% Cloths 1 Application(s) Topical daily  clopidogrel Tablet 75 milliGRAM(s) Oral daily  epoetin aleah-epbx (RETACRIT) Injectable 59077 Unit(s) IV Push <User Schedule>  guaiFENesin ER 1200 milliGRAM(s) Oral every 12 hours  hydrOXYzine hydrochloride 25 milliGRAM(s) Oral three times a day  ibuprofen  Tablet. 400 milliGRAM(s) Oral every 12 hours  mupirocin 2% Ointment 1 Application(s) Topical <User Schedule>  oxyCODONE  ER Tablet 30 milliGRAM(s) Oral every 12 hours  pantoprazole    Tablet 40 milliGRAM(s) Oral before breakfast  polyethylene glycol 3350 17 Gram(s) Oral daily  senna 2 Tablet(s) Oral at bedtime    MEDICATIONS  (PRN):  acetaminophen     Tablet .. 650 milliGRAM(s) Oral every 6 hours PRN Temp greater or equal to 38C (100.4F), Mild Pain (1 - 3)  benzocaine/menthol Lozenge 1 Lozenge Oral three times a day PRN Sore Throat  bisacodyl 5 milliGRAM(s) Oral every 12 hours PRN Constipation  oxyCODONE    IR 10 milliGRAM(s) Oral every 4 hours PRN Moderate Pain (4 - 6)  sodium chloride 0.9% lock flush 10 milliLiter(s) IV Push every 1 hour PRN Pre/post blood products, medications, blood draw, and to maintain line patency       Vitals:  Vital Signs Last 24 Hrs  T(C): 36.8 (13 Mar 2025 08:30), Max: 36.8 (13 Mar 2025 00:35)  T(F): 98.3 (13 Mar 2025 08:30), Max: 98.3 (13 Mar 2025 08:30)  HR: 78 (13 Mar 2025 08:30) (77 - 88)  BP: 150/77 (13 Mar 2025 08:30) (129/57 - 150/77)  BP(mean): --  RR: 14 (13 Mar 2025 08:30) (14 - 16)  SpO2: 99% (13 Mar 2025 08:30) (97% - 99%)    Parameters below as of 13 Mar 2025 08:30  Patient On (Oxygen Delivery Method): room air             General Appearance: Appropriately dressed and in no acute distress       Head: Normocephalic, atraumatic and no dysmorphic features  Ear, Nose, and Throat: Moist mucous membranes  CVS: S1S2+  Resp: No SOB, no wheeze or rhonchi  GI: soft NT/ND  Extremities: LE PVD : dusky toes noted   Skin: + decub      Neurological Exam:  Mental Status: Awake, alert and oriented x 2.  Able to follow simple and complex verbal commands. Able to name and repeat. fluent speech. No obvious aphasia or dysarthria noted.   Cranial Nerves: PERRL, EOMI, VFFC, sensation V1-V3 intact,  no obvious facial asymmetry, equal elevation of palate, scm/trap 5/5, tongue is midline on protrusion. no obvious papilledema on fundoscopic exam. hearing is grossly intact.   Motor: Normal bulk, tone and strength throughout uppers 4/ lowers 0-/1 5   Sensation: Intact to light touch and pinprick throughout.     Coordination: No dysmetria on FNF   Gait: deferred, wheelchair bound     Data/Labs/Imaging which I personally reviewed.      LABS:                          8.7    12.93 )-----------( 263      ( 13 Mar 2025 09:58 )             29.7                < from: CT Head No Cont (02.20.25 @ 18:47) >    ACC: [id number] EXAM:  CT BRAIN   ORDERED BY: GUY DE LA CRUZ     PROCEDURE DATE:  02/20/2025          INTERPRETATION:  CLINICAL INDICATION: Altered mental status    TECHNIQUE: Axial CT scanning of the brain was obtained from the skull   base to the vertex without the administration of intravenous contrast.   Reformatted coronal and sagittal images were subsequently obtained and   reviewed.    COMPARISON: None    FINDINGS:  There is no CT evidence of acute transcortical infarct. Age-related   involutional changes and chronic microvascular ischemic changes.    There is no hydrocephalus, mass effect, or acute intracranial hemorrhage.   No extra-axial collection. Basal cisterns are patent.    The visualized paranasal sinuses and mastoid air cells are clear.    The calvarium is intact.    IMPRESSION:  No evidence of acute transcortical infarct, acute intracranial   hemorrhage, or mass effect.    --- End of Report ---            CORTNEY REARDON MD; Attending Radiologist  This document has been electronically signed. Feb 20 2025  7:07PM    < end of copied text >

## 2025-03-13 NOTE — PROGRESS NOTE ADULT - SUBJECTIVE AND OBJECTIVE BOX
Patient is a 74y old  Male who presents with a chief complaint of ESRD requiring HD, uremia (13 Mar 2025 15:42)      SUBJECTIVE / OVERNIGHT EVENTS: ptn has an accepting rehab facility, now pending AUTH.     MEDICATIONS  (STANDING):  albuterol/ipratropium for Nebulization 3 milliLiter(s) Nebulizer every 6 hours  aspirin  chewable 81 milliGRAM(s) Oral daily  atorvastatin 40 milliGRAM(s) Oral at bedtime  chlorhexidine 2% Cloths 1 Application(s) Topical daily  clopidogrel Tablet 75 milliGRAM(s) Oral daily  epoetin aleah-epbx (RETACRIT) Injectable 26007 Unit(s) IV Push <User Schedule>  guaiFENesin ER 1200 milliGRAM(s) Oral every 12 hours  hydrOXYzine hydrochloride 25 milliGRAM(s) Oral three times a day  ibuprofen  Tablet. 400 milliGRAM(s) Oral every 12 hours  mupirocin 2% Ointment 1 Application(s) Topical <User Schedule>  oxyCODONE  ER Tablet 30 milliGRAM(s) Oral every 12 hours  pantoprazole    Tablet 40 milliGRAM(s) Oral before breakfast  polyethylene glycol 3350 17 Gram(s) Oral daily  senna 2 Tablet(s) Oral at bedtime    MEDICATIONS  (PRN):  acetaminophen     Tablet .. 650 milliGRAM(s) Oral every 6 hours PRN Temp greater or equal to 38C (100.4F), Mild Pain (1 - 3)  benzocaine/menthol Lozenge 1 Lozenge Oral three times a day PRN Sore Throat  bisacodyl 5 milliGRAM(s) Oral every 12 hours PRN Constipation  oxyCODONE    IR 10 milliGRAM(s) Oral every 4 hours PRN Moderate Pain (4 - 6)  sodium chloride 0.9% lock flush 10 milliLiter(s) IV Push every 1 hour PRN Pre/post blood products, medications, blood draw, and to maintain line patency      Vital Signs Last 24 Hrs  T(F): 98.3 (03-13-25 @ 08:30), Max: 98.3 (03-13-25 @ 08:30)  HR: 78 (03-13-25 @ 08:30) (77 - 78)  BP: 150/77 (03-13-25 @ 08:30) (129/57 - 150/77)  RR: 14 (03-13-25 @ 08:30) (14 - 16)  SpO2: 99% (03-13-25 @ 08:30) (98% - 99%)  Telemetry:   CAPILLARY BLOOD GLUCOSE        I&O's Summary    12 Mar 2025 07:01  -  13 Mar 2025 07:00  --------------------------------------------------------  IN: 0 mL / OUT: 800 mL / NET: -800 mL        PHYSICAL EXAM:  GENERAL: NAD, well-developed  HEAD:  Atraumatic, Normocephalic  EYES: EOMI, PERRLA, conjunctiva and sclera clear  NECK: Supple, No JVD  CHEST/LUNG: Clear to auscultation bilaterally; No wheeze  HEART: Regular rate and rhythm; No murmurs, rubs, or gallops  ABDOMEN: Soft, Nontender, Nondistended; Bowel sounds present  EXTREMITIES:  2+ Peripheral Pulses, No clubbing, cyanosis, or edema  PSYCH: AAOx3  NEUROLOGY: non-focal  SKIN: No rashes or lesions    LABS:                        8.7    12.93 )-----------( 263      ( 13 Mar 2025 09:58 )             29.7                     RADIOLOGY & ADDITIONAL TESTS:    Imaging Personally Reviewed:    Consultant(s) Notes Reviewed:      Care Discussed with Consultants/Other Providers:

## 2025-03-14 NOTE — CHART NOTE - NSCHARTNOTEFT_GEN_A_CORE
UROLOGY EVENT NOTE    Patient seen and examined at bedside. Called for pain around SP tube site, urine draining cloudy with sediment.        Gen: NAD  Abdomen: Soft ND, mild tenderness in SP area, SP site with no erythema or signs of infection  : SP tube draining cloudy urine with sediment; irrigated with NS, some urine drained.  Patient states afterwards pain is better.      A/P:   - recommend q shift Flushing with NS and piston syringe   - no indication to change tube at this time  - consider UA, urine culture

## 2025-03-14 NOTE — PROGRESS NOTE ADULT - PROBLEM SELECTOR PLAN 3
-Neuro following  -ABG 2/20 acceptable  -Pt lethargic 2/22, ABG never sent but AMS appears to be improving   -ABG 2/28 with no co2 retention.  3/9: he seems pretty alert and awake and responsive appropriately to questions  /: resolved:   he is alert sand awke and responsive to questions well : needs pt ot  3/11 : seems to be doing  ok : no sob:  on room air  3/12: resolved  3/14: he is totally alert and awake and responsive to questions

## 2025-03-14 NOTE — PROGRESS NOTE ADULT - SUBJECTIVE AND OBJECTIVE BOX
Patient is a 74y old  Male who presents with a chief complaint of ESRD requiring HD, uremia (14 Mar 2025 15:27)      SUBJECTIVE / OVERNIGHT EVENTS: no new events    MEDICATIONS  (STANDING):  albuterol/ipratropium for Nebulization 3 milliLiter(s) Nebulizer every 6 hours  aspirin  chewable 81 milliGRAM(s) Oral daily  atorvastatin 40 milliGRAM(s) Oral at bedtime  chlorhexidine 2% Cloths 1 Application(s) Topical daily  clopidogrel Tablet 75 milliGRAM(s) Oral daily  guaiFENesin ER 1200 milliGRAM(s) Oral every 12 hours  hydrOXYzine hydrochloride 25 milliGRAM(s) Oral three times a day  ibuprofen  Tablet. 400 milliGRAM(s) Oral every 12 hours  mupirocin 2% Ointment 1 Application(s) Topical <User Schedule>  oxyCODONE  ER Tablet 30 milliGRAM(s) Oral every 12 hours  pantoprazole    Tablet 40 milliGRAM(s) Oral before breakfast  piperacillin/tazobactam IVPB. 3.375 Gram(s) IV Intermittent once  piperacillin/tazobactam IVPB.- 3.375 Gram(s) IV Intermittent once  polyethylene glycol 3350 17 Gram(s) Oral daily  senna 2 Tablet(s) Oral at bedtime  vancomycin  IVPB 1000 milliGRAM(s) IV Intermittent once    MEDICATIONS  (PRN):  acetaminophen     Tablet .. 650 milliGRAM(s) Oral every 6 hours PRN Temp greater or equal to 38C (100.4F), Mild Pain (1 - 3)  benzocaine/menthol Lozenge 1 Lozenge Oral three times a day PRN Sore Throat  bisacodyl 5 milliGRAM(s) Oral every 12 hours PRN Constipation  oxyCODONE    IR 10 milliGRAM(s) Oral every 4 hours PRN Moderate Pain (4 - 6)  sodium chloride 0.9% lock flush 10 milliLiter(s) IV Push every 1 hour PRN Pre/post blood products, medications, blood draw, and to maintain line patency      Vital Signs Last 24 Hrs  T(F): 97.6 (03-14-25 @ 15:29), Max: 97.9 (03-14-25 @ 00:17)  HR: 101 (03-14-25 @ 15:29) (79 - 101)  BP: 103/62 (03-14-25 @ 15:29) (103/62 - 153/74)  RR: 18 (03-14-25 @ 15:29) (18 - 18)  SpO2: 98% (03-14-25 @ 15:29) (94% - 98%)  Telemetry:   CAPILLARY BLOOD GLUCOSE      POCT Blood Glucose.: 95 mg/dL (14 Mar 2025 07:56)    I&O's Summary    13 Mar 2025 07:01  -  14 Mar 2025 07:00  --------------------------------------------------------  IN: 0 mL / OUT: 800 mL / NET: -800 mL    14 Mar 2025 07:01  -  14 Mar 2025 17:22  --------------------------------------------------------  IN: 0 mL / OUT: 700 mL / NET: -700 mL        PHYSICAL EXAM:  GENERAL: NAD, well-developed  HEAD:  Atraumatic, Normocephalic  EYES: EOMI, PERRLA, conjunctiva and sclera clear  NECK: Supple, No JVD  CHEST/LUNG: Clear to auscultation bilaterally; No wheeze  HEART: Regular rate and rhythm; No murmurs, rubs, or gallops  ABDOMEN: Soft, Nontender, Nondistended; Bowel sounds present  EXTREMITIES:  2+ Peripheral Pulses, No clubbing, cyanosis, or edema  PSYCH: AAOx3  NEUROLOGY: non-focal  SKIN: No rashes or lesions    LABS:                        9.9    13.26 )-----------( 254      ( 14 Mar 2025 10:04 )             33.8                     RADIOLOGY & ADDITIONAL TESTS:    Imaging Personally Reviewed:    Consultant(s) Notes Reviewed:      Care Discussed with Consultants/Other Providers:

## 2025-03-14 NOTE — PROGRESS NOTE ADULT - SUBJECTIVE AND OBJECTIVE BOX
Subjective: Patient seen and examined. No new events except as noted.     REVIEW OF SYSTEMS:    CONSTITUTIONAL:+ weakness, fevers or chills  EYES/ENT: No visual changes;  No vertigo or throat pain   NECK: No pain or stiffness  RESPIRATORY: No cough, wheezing, hemoptysis; No shortness of breath  CARDIOVASCULAR: No chest pain or palpitations  GASTROINTESTINAL: No abdominal or epigastric pain. No nausea, vomiting, or hematemesis; No diarrhea or constipation. No melena or hematochezia.  GENITOURINARY: No dysuria, frequency or hematuria  NEUROLOGICAL: No numbness or weakness  SKIN: No itching, burning, rashes, or lesions   All other review of systems is negative unless indicated above.    MEDICATIONS:  MEDICATIONS  (STANDING):  albuterol/ipratropium for Nebulization 3 milliLiter(s) Nebulizer every 6 hours  aspirin  chewable 81 milliGRAM(s) Oral daily  atorvastatin 40 milliGRAM(s) Oral at bedtime  chlorhexidine 2% Cloths 1 Application(s) Topical daily  clopidogrel Tablet 75 milliGRAM(s) Oral daily  epoetin aleah-epbx (RETACRIT) Injectable 67302 Unit(s) IV Push <User Schedule>  guaiFENesin ER 1200 milliGRAM(s) Oral every 12 hours  hydrOXYzine hydrochloride 25 milliGRAM(s) Oral three times a day  ibuprofen  Tablet. 400 milliGRAM(s) Oral every 12 hours  mupirocin 2% Ointment 1 Application(s) Topical <User Schedule>  oxyCODONE  ER Tablet 30 milliGRAM(s) Oral every 12 hours  pantoprazole    Tablet 40 milliGRAM(s) Oral before breakfast  polyethylene glycol 3350 17 Gram(s) Oral daily  senna 2 Tablet(s) Oral at bedtime      PHYSICAL EXAM:  T(C): 36.5 (03-14-25 @ 09:30), Max: 36.7 (03-13-25 @ 16:55)  HR: 86 (03-14-25 @ 09:30) (77 - 86)  BP: 138/67 (03-14-25 @ 09:30) (133/72 - 153/74)  RR: 18 (03-14-25 @ 00:17) (18 - 18)  SpO2: 94% (03-14-25 @ 09:30) (94% - 100%)  Wt(kg): --  I&O's Summary    13 Mar 2025 07:01  -  14 Mar 2025 07:00  --------------------------------------------------------  IN: 0 mL / OUT: 800 mL / NET: -800 mL          Appearance: NAD  HEENT:  Dry oral mucosa, PERRL, EOMI	  Lymphatic: No lymphadenopathy  Cardiovascular: Normal S1 S2, No JVD, No murmurs, No edema  Respiratory: Lungs clear to auscultation	  Psychiatry: A & O x 3, Mood & affect appropriate  Skin: No rashes, No ecchymoses, No cyanosis	  Neurologic: Non-focal  GI/Abd: Soft, obese, nontender. Dressing to Q C/D/I. Suprapubic catheter in place  Ext: Palp femoral pulses bilat. BLE atrophic, minimal dorsi/plantarflexion bilaterally. Sensation grossly intact. LLE edematous compared to R, erythema to right toes/forefoot. mottling of right toes/forefoot/heel  LLE:  small superficial abrasion, +edema and +ecchymosis over L knee  +TTP over L knee, no TTP along remainder of extremity; compartments soft  Limited ROM at knee 2/2 pain  No gross varus/valgus laxity, but assessment limited 2/2 pain  Motor: TA/EHL/GS/FHL intact  Sensory: DP/SP/Tib/Jordan/Saph SILT  +DP pulse (symmetric relative to contralateral side), WWP   pressure wound from the LLE immobilizer posteriorly proximal to the knee.  Vascular: Peripheral pulses palpable 2+ bilaterally        LABS:    CARDIAC MARKERS:                                8.7    12.93 )-----------( 263      ( 13 Mar 2025 09:58 )             29.7           proBNP:   Lipid Profile:   HgA1c:   TSH:             TELEMETRY: 	    ECG:  	  RADIOLOGY:   DIAGNOSTIC TESTING:  [ ] Echocardiogram:  [ ]  Catheterization:  [ ] Stress Test:    OTHER:

## 2025-03-14 NOTE — PROGRESS NOTE ADULT - SUBJECTIVE AND OBJECTIVE BOX
Overnight events noted      VITAL:  T(C): , Max: 36.7 (03-13-25 @ 16:55)  T(F): , Max: 98 (03-13-25 @ 16:55)  HR: 79 (03-14-25 @ 00:17)  BP: 153/74 (03-14-25 @ 00:17)  BP(mean): --  RR: 18 (03-14-25 @ 00:17)  SpO2: 98% (03-14-25 @ 00:17)  Wt(kg): --      PHYSICAL EXAM:  Constitutional: alert, NAD, in good spirits  HEENT: NCAT, DMM  Neck: Supple, No JVD  Respiratory: CTA-b/l  Cardiovascular: RRR s1s2, no m/r/g  Gastrointestinal: BS+, soft, NT/ND  : (+)suprapubic cath  Extremities: No peripheral edema b/l  Neurological: reduced generalized strength  Back: no CVAT b/l  Skin: (+)cyanotic changes b/l feet  Access: RIJ tunneled cath      LABS:                        8.7    12.93 )-----------( 263      ( 13 Mar 2025 09:58 )             29.7     Na(136)/K(3.9)/Cl(101)/HCO3(26)/BUN(15)/Cr(2.78)Glu(104)/Ca(7.3)/Mg(--)/PO4(--)    03-11 @ 09:50        IMPRESSION: 74M w/ polio, neurogenic bladder/suprapubic catheter, 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 Due for next HD today    (2)Hypophosphatemia - now off PO4 binders and off dietary PO4 restriction    (3)Anemia - s/p IV iron; on Retacrit with HD    (4)PAD - s/p LE bypass 3/3/25 - cyanotic changes of toes b/l - for further management after discharge    (5) - s/p suprapubic cath change 3/10/25    (6)CV - mildly hypertensive/starting to accumulate fluid - we should attempt some level of UF with HD from this point forward      RECOMMEND:   (1)HD today - 0.7kg UF as able; Retacrit with HD  (2)D/C planning per primary team              Rafita Denny MD  [facility]  Office/on call physician: [phone]  Cell (7a-7p): [phone]       No pain, no sob      VITAL:  T(C): , Max: 36.7 (03-13-25 @ 16:55)  T(F): , Max: 98 (03-13-25 @ 16:55)  HR: 79 (03-14-25 @ 00:17)  BP: 153/74 (03-14-25 @ 00:17)  BP(mean): --  RR: 18 (03-14-25 @ 00:17)  SpO2: 98% (03-14-25 @ 00:17)  Wt(kg): --      PHYSICAL EXAM:  Constitutional: alert, NAD, in good spirits  HEENT: NCAT, DMM  Neck: Supple, No JVD  Respiratory: CTA-b/l  Cardiovascular: RRR s1s2, no m/r/g  Gastrointestinal: BS+, soft, NT/ND  : (+)suprapubic cath  Extremities: No peripheral edema b/l  Neurological: reduced generalized strength  Back: no CVAT b/l  Skin: (+)cyanotic changes b/l feet  Access: RIJ tunneled cath      LABS:                        8.7    12.93 )-----------( 263      ( 13 Mar 2025 09:58 )             29.7     Na(136)/K(3.9)/Cl(101)/HCO3(26)/BUN(15)/Cr(2.78)Glu(104)/Ca(7.3)/Mg(--)/PO4(--)    03-11 @ 09:50        IMPRESSION: 74M w/ polio, neurogenic bladder/suprapubic catheter, 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 Due for next HD today    (2)Hypophosphatemia - now off PO4 binders and off dietary PO4 restriction    (3)Anemia - s/p IV iron; on Retacrit with HD    (4)PAD - s/p LE bypass 3/3/25 - cyanotic changes of toes b/l - for further management after discharge    (5) - s/p suprapubic cath change 3/10/25    (6)CV - mildly hypertensive/starting to accumulate fluid - we should attempt some level of UF with HD from this point forward      RECOMMEND:   (1)HD today - 0.7kg UF as able; Retacrit with HD  (2)D/C planning per primary team              Rafita Denny MD  [facility], [place]  Office/on call physician: [phone]  Cell (7a-7p): [phone]

## 2025-03-14 NOTE — PROGRESS NOTE ADULT - ASSESSMENT

## 2025-03-14 NOTE — PROGRESS NOTE ADULT - SUBJECTIVE AND OBJECTIVE BOX
Date of Service: 03-14-25 @ 15:27    Patient is a 74y old  Male who presents with a chief complaint of ESRD requiring HD, uremia (14 Mar 2025 10:35)      Any change in ROS: seems OK: has pain in leg:  getting HD: has occasional cough ;  on room air:  denies any sob  :       MEDICATIONS  (STANDING):  albuterol/ipratropium for Nebulization 3 milliLiter(s) Nebulizer every 6 hours  aspirin  chewable 81 milliGRAM(s) Oral daily  atorvastatin 40 milliGRAM(s) Oral at bedtime  chlorhexidine 2% Cloths 1 Application(s) Topical daily  clopidogrel Tablet 75 milliGRAM(s) Oral daily  guaiFENesin ER 1200 milliGRAM(s) Oral every 12 hours  hydrOXYzine hydrochloride 25 milliGRAM(s) Oral three times a day  ibuprofen  Tablet. 400 milliGRAM(s) Oral every 12 hours  mupirocin 2% Ointment 1 Application(s) Topical <User Schedule>  oxyCODONE  ER Tablet 30 milliGRAM(s) Oral every 12 hours  pantoprazole    Tablet 40 milliGRAM(s) Oral before breakfast  polyethylene glycol 3350 17 Gram(s) Oral daily  senna 2 Tablet(s) Oral at bedtime    MEDICATIONS  (PRN):  acetaminophen     Tablet .. 650 milliGRAM(s) Oral every 6 hours PRN Temp greater or equal to 38C (100.4F), Mild Pain (1 - 3)  benzocaine/menthol Lozenge 1 Lozenge Oral three times a day PRN Sore Throat  bisacodyl 5 milliGRAM(s) Oral every 12 hours PRN Constipation  oxyCODONE    IR 10 milliGRAM(s) Oral every 4 hours PRN Moderate Pain (4 - 6)  sodium chloride 0.9% lock flush 10 milliLiter(s) IV Push every 1 hour PRN Pre/post blood products, medications, blood draw, and to maintain line patency    Vital Signs Last 24 Hrs  T(C): 36.2 (14 Mar 2025 14:55), Max: 36.7 (13 Mar 2025 16:55)  T(F): 97.2 (14 Mar 2025 14:55), Max: 98 (13 Mar 2025 16:55)  HR: 92 (14 Mar 2025 14:55) (77 - 92)  BP: 120/58 (14 Mar 2025 14:55) (120/58 - 153/74)  BP(mean): --  RR: 18 (14 Mar 2025 14:55) (18 - 18)  SpO2: 96% (14 Mar 2025 14:55) (94% - 100%)    Parameters below as of 14 Mar 2025 14:55  Patient On (Oxygen Delivery Method): room air        I&O's Summary    13 Mar 2025 07:01  -  14 Mar 2025 07:00  --------------------------------------------------------  IN: 0 mL / OUT: 800 mL / NET: -800 mL    14 Mar 2025 07:01  -  14 Mar 2025 15:27  --------------------------------------------------------  IN: 0 mL / OUT: 700 mL / NET: -700 mL          Physical Exam:   GENERAL: obese  HEENT: VINNY/   Atraumatic, Normocephalic  ENMT: No tonsillar erythema, exudates, or enlargement; Moist mucous membranes, Good dentition, No lesions  NECK: Supple, No JVD, Normal thyroid  CHEST/LUNG: Clear to auscultaion  CVS: Regular rate and rhythm; No murmurs, rubs, or gallops  GI: : Soft, Nontender, Nondistended; Bowel sounds present  NERVOUS SYSTEM:  Alert & Oriented X3  EXTREMITIES:  - edema  LYMPH: No lymphadenopathy noted  SKIN: No rashes or lesions  ENDOCRINOLOGY: No Thyromegaly  PSYCH: Appropriate    Labs:                              9.9    13.26 )-----------( 254      ( 14 Mar 2025 10:04 )             33.8                         8.7    12.93 )-----------( 263      ( 13 Mar 2025 09:58 )             29.7                         8.6    15.12 )-----------( 277      ( 12 Mar 2025 08:49 )             29.0                         8.9    12.90 )-----------( 268      ( 11 Mar 2025 09:50 )             30.4     03-11    136  |  101  |  15  ----------------------------<  104[H]  3.9   |  26  |  2.78[H]        CAPILLARY BLOOD GLUCOSE      POCT Blood Glucose.: 95 mg/dL (14 Mar 2025 07:56)            rad< from: Xray Chest 1 View- PORTABLE-Urgent (Xray Chest 1 View- PORTABLE-Urgent .) (03.01.25 @ 22:48) >        INTERPRETATION:  EXAMINATION: XR CHEST URGENT    CLINICAL INDICATION: cough    TECHNIQUE: Single frontal, portable view of the chest was obtained.    COMPARISON: Chest x-ray 2/20/2025.    FINDINGS:  Right-sided tunneled hemodialysis catheter terminates in the SVC.  The heart is not accurately assessed in this AP projection.  The lungs are clear.  There is no pneumothorax or pleural effusion.  No acute bony abnormality.    IMPRESSION:  Clear lungs.    --- End of Report ---          GILBERT LEON MD; Resident Radiologist  This document has been electronically signed.   JESUS HALEY DO; Attending Interventional Radiologist  This document has been electronically signed. Mar  2 2025  9:37AM    < end of copied text >        RECENT CULTURES:        RESPIRATORY CULTURES:    ct< from: CT Chest No Cont (02.23.25 @ 16:46) >  IMPRESSION:  Mucoid impaction and airway associated subpleural groundglass opacities,   most prominent in left lower lobe and to a lesser degree in right lower   lobe.  Mild bronchial wall thickening, likely inflammatory.    --- End of Report ---    < end of copied text >        Studies  Chest X-RAY  CT SCAN Chest   Venous Dopplers: LE:   CT Abdomen  Others               Penile pain and goodwin cath leaking since last night ,goodwin cath was placed yesterday in ER

## 2025-03-14 NOTE — PROGRESS NOTE ADULT - ASSESSMENT
74 year old male with CKD, sp polio, paraplegia with associated neurogenic bladder s/p suprapubic catheter who was admitted s/p fall on 2/16.   CKD - ESRD, now s/p DEYA [place] 2/17, on HD  ruled out cellulitis around suprapubic cath  2/20 s/p RRT for tremors and confusion -- pt with chronic tremors although notably worse  hyponatremia    mrsa negative   2/20 CXR with clear lungs   SPC site with chronic/stable inflammatory changes, no drainage - no sign of SSTI  CTA abd with occlusion of b/l external iliac arteries and b/l superficial femoral arteries with distal reconstitution at popliteal arteries; patent three vessel runoff of RLE with 2 vessel runoff of LLE with occlusion of L mid anterior tibial artery with distal reconstitution  Bcx negative to date   mental status at baseline  Vascular following for worsening PAD with worsening ischemic changes   -3/3 s/p RLE angiogram b/l iliac artery stents   WBC noted-stable, remains afebrile     s/p vancomycin x1 2/20  s/p zosyn 2/20-2/22      Recommendations:   Continue off antibiotics   Vascular surgery following  HD per renal    Monitor temps/WBC  Continue rest of care per primary team     Stable from ID standpoint at this time.   Discharge planning per primary team.   Please call if any questions, thank you.     Bridgette Ramirez M.D.  [facility]  Available on [employer] TEAMS - *PREFERRED*  [phone]  After 5pm on weekdays and all day on weekends - please call [phone]     Thank you for consulting us and involving us in the management of this patients case. In addition to reviewing history, imaging, documents, labs, microbiology, took into account antibiotic stewardship, local antibiogram and infection control strategies and potential transmission issues at time of treatment decision making process.

## 2025-03-14 NOTE — PROGRESS NOTE ADULT - SUBJECTIVE AND OBJECTIVE BOX
Neurology      S; patient seen. no neuro changes            Medications: MEDICATIONS  (STANDING):  albuterol/ipratropium for Nebulization 3 milliLiter(s) Nebulizer every 6 hours  aspirin  chewable 81 milliGRAM(s) Oral daily  atorvastatin 40 milliGRAM(s) Oral at bedtime  chlorhexidine 2% Cloths 1 Application(s) Topical daily  clopidogrel Tablet 75 milliGRAM(s) Oral daily  guaiFENesin ER 1200 milliGRAM(s) Oral every 12 hours  hydrOXYzine hydrochloride 25 milliGRAM(s) Oral three times a day  ibuprofen  Tablet. 400 milliGRAM(s) Oral every 12 hours  mupirocin 2% Ointment 1 Application(s) Topical <User Schedule>  oxyCODONE  ER Tablet 30 milliGRAM(s) Oral every 12 hours  pantoprazole    Tablet 40 milliGRAM(s) Oral before breakfast  polyethylene glycol 3350 17 Gram(s) Oral daily  senna 2 Tablet(s) Oral at bedtime    MEDICATIONS  (PRN):  acetaminophen     Tablet .. 650 milliGRAM(s) Oral every 6 hours PRN Temp greater or equal to 38C (100.4F), Mild Pain (1 - 3)  benzocaine/menthol Lozenge 1 Lozenge Oral three times a day PRN Sore Throat  bisacodyl 5 milliGRAM(s) Oral every 12 hours PRN Constipation  oxyCODONE    IR 10 milliGRAM(s) Oral every 4 hours PRN Moderate Pain (4 - 6)  sodium chloride 0.9% lock flush 10 milliLiter(s) IV Push every 1 hour PRN Pre/post blood products, medications, blood draw, and to maintain line patency       Vitals:  Vital Signs Last 24 Hrs  T(C): 36.2 (14 Mar 2025 11:55), Max: 36.7 (13 Mar 2025 16:55)  T(F): 97.2 (14 Mar 2025 11:55), Max: 98 (13 Mar 2025 16:55)  HR: 83 (14 Mar 2025 11:55) (77 - 86)  BP: 149/71 (14 Mar 2025 11:55) (133/72 - 153/74)  BP(mean): --  RR: 18 (14 Mar 2025 11:55) (18 - 18)  SpO2: 98% (14 Mar 2025 11:55) (94% - 100%)    Parameters below as of 14 Mar 2025 11:55  Patient On (Oxygen Delivery Method): room air                   General Appearance: Appropriately dressed and in no acute distress       Head: Normocephalic, atraumatic and no dysmorphic features  Ear, Nose, and Throat: Moist mucous membranes  CVS: S1S2+  Resp: No SOB, no wheeze or rhonchi  GI: soft NT/ND  Extremities: LE PVD : dusky toes noted   Skin: + decub      Neurological Exam:  Mental Status: Awake, alert and oriented x 2.  Able to follow simple and complex verbal commands. Able to name and repeat. fluent speech. No obvious aphasia or dysarthria noted.   Cranial Nerves: PERRL, EOMI, VFFC, sensation V1-V3 intact,  no obvious facial asymmetry, equal elevation of palate, scm/trap 5/5, tongue is midline on protrusion. no obvious papilledema on fundoscopic exam. hearing is grossly intact.   Motor: Normal bulk, tone and strength throughout uppers 4/ lowers 0-/1 5   Sensation: Intact to light touch and pinprick throughout.     Coordination: No dysmetria on FNF   Gait: deferred, wheelchair bound     Data/Labs/Imaging which I personally reviewed.      LABS:                          9.9    13.26 )-----------( 254      ( 14 Mar 2025 10:04 )             33.8                   < from: CT Head No Cont (02.20.25 @ 18:47) >    ACC: [id number] EXAM:  CT BRAIN   ORDERED BY: GUY DE LA CRUZ     PROCEDURE DATE:  02/20/2025          INTERPRETATION:  CLINICAL INDICATION: Altered mental status    TECHNIQUE: Axial CT scanning of the brain was obtained from the skull   base to the vertex without the administration of intravenous contrast.   Reformatted coronal and sagittal images were subsequently obtained and   reviewed.    COMPARISON: None    FINDINGS:  There is no CT evidence of acute transcortical infarct. Age-related   involutional changes and chronic microvascular ischemic changes.    There is no hydrocephalus, mass effect, or acute intracranial hemorrhage.   No extra-axial collection. Basal cisterns are patent.    The visualized paranasal sinuses and mastoid air cells are clear.    The calvarium is intact.    IMPRESSION:  No evidence of acute transcortical infarct, acute intracranial   hemorrhage, or mass effect.    --- End of Report ---            CORTNEY REARDON MD; Attending Radiologist  This document has been electronically signed. Feb 20 2025  7:07PM    < end of copied text >

## 2025-03-14 NOTE — PROVIDER CONTACT NOTE (OTHER) - ASSESSMENT
Pt AOx4 on RA. VSS. Bowel sounds within defined limits. Suprapubic cath site reddened and tender upon palpation. Cath patent, and flushed without difficulty.

## 2025-03-14 NOTE — CHART NOTE - NSCHARTNOTEFT_GEN_A_CORE
Patient c/o pain around the suprapubic site. Urology exchanged the tube today. Recommended for UA and Cx. Also start Abx. DW Dr Martinez. She will call ID.   Alicia Tian NP  Medicine ACP

## 2025-03-14 NOTE — PROGRESS NOTE ADULT - SUBJECTIVE AND OBJECTIVE BOX
[facility]  SABINO Griffin Y. Patel, S. Shah, G. Casimir  [phone]  ([phone] - weekdays after 5pm and weekends)    Name: BRIAN DEMPSEY  Age/Gender: 74y Male  MRN: [id number]    Interval History:  Patient seen and examined this morning.   No new complaints noted.  Notes reviewed  No concerning overnight events  Afebrile   Allergies: No Known Allergies      Objective:  Vitals:   T(F): 97.7 (03-14-25 @ 09:30), Max: 98 (03-13-25 @ 16:55)  HR: 86 (03-14-25 @ 09:30) (77 - 86)  BP: 138/67 (03-14-25 @ 09:30) (133/72 - 153/74)  RR: 18 (03-14-25 @ 00:17) (18 - 18)  SpO2: 94% (03-14-25 @ 09:30) (94% - 100%)  Physical Examination:  General: no acute distress  HEENT: normocephalic, atraumatic  Respiratory: breathing comfortably  Cardiovascular: S1 and S2 present  Gastrointestinal: nondistended  Extremities: LE with ischemic changes    Laboratory Studies:  CBC:                       9.9    13.26 )-----------( 254      ( 14 Mar 2025 10:04 )             33.8     WBC Trend:  13.26 03-14-25 @ 10:04  12.93 03-13-25 @ 09:58  15.12 03-12-25 @ 08:49  12.90 03-11-25 @ 09:50  11.58 03-09-25 @ 07:09  12.94 03-08-25 @ 09:28    CMP:   Creatinine: 2.78 mg/dL (03-11-25 @ 09:50)  Creatinine: 2.31 mg/dL (03-09-25 @ 07:09)    Microbiology: reviewed   Radiology: reviewed     Medications:  acetaminophen     Tablet .. 650 milliGRAM(s) Oral every 6 hours PRN  albuterol/ipratropium for Nebulization 3 milliLiter(s) Nebulizer every 6 hours  aspirin  chewable 81 milliGRAM(s) Oral daily  atorvastatin 40 milliGRAM(s) Oral at bedtime  benzocaine/menthol Lozenge 1 Lozenge Oral three times a day PRN  bisacodyl 5 milliGRAM(s) Oral every 12 hours PRN  chlorhexidine 2% Cloths 1 Application(s) Topical daily  clopidogrel Tablet 75 milliGRAM(s) Oral daily  epoetin aleah-epbx (RETACRIT) Injectable 32718 Unit(s) IV Push <User Schedule>  guaiFENesin ER 1200 milliGRAM(s) Oral every 12 hours  hydrOXYzine hydrochloride 25 milliGRAM(s) Oral three times a day  ibuprofen  Tablet. 400 milliGRAM(s) Oral every 12 hours  mupirocin 2% Ointment 1 Application(s) Topical <User Schedule>  oxyCODONE    IR 10 milliGRAM(s) Oral every 4 hours PRN  oxyCODONE  ER Tablet 30 milliGRAM(s) Oral every 12 hours  pantoprazole    Tablet 40 milliGRAM(s) Oral before breakfast  polyethylene glycol 3350 17 Gram(s) Oral daily  senna 2 Tablet(s) Oral at bedtime  sodium chloride 0.9% lock flush 10 milliLiter(s) IV Push every 1 hour PRN    Prior/Completed Antimicrobials:  piperacillin/tazobactam IVPB.  piperacillin/tazobactam IVPB.-  vancomycin  IVPB

## 2025-03-14 NOTE — PROCEDURE NOTE - NSPROCDETAILS_GEN_ALL_CORE
sterile technique, old cysto removed, new tube inserted
sterile technique, indwelling urinary device inserted/a urinary catheter insertion kit was used for all insertion materials
lumen(s) aspirated and flushed/sterile technique, catheter placed/ultrasound guidance with use of sterile gel and probe cove

## 2025-03-14 NOTE — PROGRESS NOTE ADULT - PROBLEM SELECTOR PLAN 6
-Monitor leukocytosis/fever curve  -CXR as above, no clear infiltrate  -ID f/u.  3/9: WBC slowly downtrending  3/10: con tto downtrend  3/11: wbc is slightly high o fver;  3/12: wbc increased further   no fever:  3/13: WBC spontaneously downtrended today  3/14: wbc slightly high ; no fever;

## 2025-03-14 NOTE — PROGRESS NOTE ADULT - PROBLEM SELECTOR PLAN 1
pulm wise he remains ok ; on room air : ct chest showed: Mucoid impaction and airway associated subpleural groundglass opacities, most prominent in left lower lobe and to a lesser degree in right lower lobe. Mild bronchial wall thickening, likely inflammatory.  clinically he seems to be doing ok : no resp symptoms:  he would need repeat ct scan chest  in 6 weeks time

## 2025-03-14 NOTE — PROGRESS NOTE ADULT - ASSESSMENT
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 The initial plan was that he would present to the [facility] ER Monday 2/17 for HD initiation. However, day of admission, he fell and sustained trauma to his knee. Called to consult for cough, elevated R hemidiaphragm.

## 2025-03-14 NOTE — PROCEDURE NOTE - ADDITIONAL PROCEDURE DETAILS
Patient with persistent suprapubic pain after recent change.  Exchanged as above.   Urine draining.  Specimen for UA/ Urine culture left at bedside. Pain could be due to UTI- would send urine and treat empirically for now.

## 2025-03-15 NOTE — PROGRESS NOTE ADULT - SUBJECTIVE AND OBJECTIVE BOX
Date of Service: 03-15-25 @ 14:07    Patient is a 74y old  Male who presents with a chief complaint of ESRD requiring HD, uremia (15 Mar 2025 11:20)      Any change in ROS: seems very good;   no sob:   no cough ;   no phlegm ;       MEDICATIONS  (STANDING):  albuterol/ipratropium for Nebulization 3 milliLiter(s) Nebulizer every 6 hours  aspirin  chewable 81 milliGRAM(s) Oral daily  atorvastatin 40 milliGRAM(s) Oral at bedtime  chlorhexidine 2% Cloths 1 Application(s) Topical daily  clopidogrel Tablet 75 milliGRAM(s) Oral daily  guaiFENesin ER 1200 milliGRAM(s) Oral every 12 hours  hydrOXYzine hydrochloride 25 milliGRAM(s) Oral three times a day  ibuprofen  Tablet. 400 milliGRAM(s) Oral every 12 hours  mupirocin 2% Ointment 1 Application(s) Topical <User Schedule>  oxyCODONE  ER Tablet 30 milliGRAM(s) Oral every 12 hours  pantoprazole    Tablet 40 milliGRAM(s) Oral before breakfast  piperacillin/tazobactam IVPB.. 3.375 Gram(s) IV Intermittent every 12 hours  polyethylene glycol 3350 17 Gram(s) Oral daily  senna 2 Tablet(s) Oral at bedtime    MEDICATIONS  (PRN):  acetaminophen     Tablet .. 650 milliGRAM(s) Oral every 6 hours PRN Temp greater or equal to 38C (100.4F), Mild Pain (1 - 3)  benzocaine/menthol Lozenge 1 Lozenge Oral three times a day PRN Sore Throat  bisacodyl 5 milliGRAM(s) Oral every 12 hours PRN Constipation  oxyCODONE    IR 10 milliGRAM(s) Oral every 4 hours PRN Moderate Pain (4 - 6)  sodium chloride 0.9% lock flush 10 milliLiter(s) IV Push every 1 hour PRN Pre/post blood products, medications, blood draw, and to maintain line patency    Vital Signs Last 24 Hrs  T(C): 36.7 (15 Mar 2025 08:55), Max: 36.7 (15 Mar 2025 01:02)  T(F): 98.1 (15 Mar 2025 08:55), Max: 98.1 (15 Mar 2025 01:02)  HR: 81 (15 Mar 2025 08:55) (80 - 101)  BP: 130/59 (15 Mar 2025 08:55) (103/62 - 152/85)  BP(mean): --  RR: 18 (15 Mar 2025 01:02) (18 - 18)  SpO2: 99% (15 Mar 2025 01:02) (96% - 99%)    Parameters below as of 15 Mar 2025 01:02  Patient On (Oxygen Delivery Method): room air        I&O's Summary    14 Mar 2025 07:01  -  15 Mar 2025 07:00  --------------------------------------------------------  IN: 0 mL / OUT: 700 mL / NET: -700 mL          Physical Exam:   GENERAL: NAD, well-groomed, well-developed  HEENT: VINNY/   Atraumatic, Normocephalic  ENMT: No tonsillar erythema, exudates, or enlargement; Moist mucous membranes, Good dentition, No lesions  NECK: Supple, No JVD, Normal thyroid  CHEST/LUNG: Clear to auscultaion  CVS: Regular rate and rhythm; No murmurs, rubs, or gallops  GI: : Soft, Nontender, Nondistended; Bowel sounds present  NERVOUS SYSTEM:  Alert & Oriented X3  EXTREMITIES:  elo discolored LE toes:    LYMPH: No lymphadenopathy noted  SKIN: No rashes or lesions  ENDOCRINOLOGY: No Thyromegaly  PSYCH: Appropriate    Labs:                              9.0    11.98 )-----------( 216      ( 15 Mar 2025 10:30 )             31.2                         9.9    13.26 )-----------( 254      ( 14 Mar 2025 10:04 )             33.8                         8.7    12.93 )-----------( 263      ( 13 Mar 2025 09:58 )             29.7                         8.6    15.12 )-----------( 277      ( 12 Mar 2025 08:49 )             29.0           CAPILLARY BLOOD GLUCOSE              Urinalysis Basic - ( 14 Mar 2025 18:47 )    Color: Yellow / Appearance: Cloudy / S.007 / pH: x  Gluc: x / Ketone: Negative mg/dL  / Bili: Negative / Urobili: 0.2 mg/dL   Blood: x / Protein: 30 mg/dL / Nitrite: Negative   Leuk Esterase: Large / RBC: 226 /HPF /  /HPF   Sq Epi: x / Non Sq Epi: 0 /HPF / Bacteria: Occasional /HPF            RECENT CULTURES:        RESPIRATORY CULTURES:    rad< from: Xray Chest 1 View- PORTABLE-Urgent (Xray Chest 1 View- PORTABLE-Urgent .) (25 @ 22:48) >  PROCEDURE DATE:  2025          INTERPRETATION:  EXAMINATION: XR CHEST URGENT    CLINICAL INDICATION: cough    TECHNIQUE: Single frontal, portable view of the chest was obtained.    COMPARISON: Chest x-ray 2025.    FINDINGS:  Right-sided tunneled hemodialysis catheter terminates in the SVC.  The heart is not accurately assessed in this AP projection.  The lungs are clear.  There is no pneumothorax or pleural effusion.  No acute bony abnormality.    IMPRESSION:  Clear lungs.    --- End of Report ---          GILBERT LEON MD; Resident Radiologist  This document has been electronically signed.   JESUS HALEY DO; Attending Interventional Radiologist  This document has been electronically signed. Mar  2 2025  9:37AM    < end of copied text >        Studies  Chest X-RAY  CT SCAN Chest   Venous Dopplers: LE:   CT Abdomen  Others

## 2025-03-15 NOTE — PROGRESS NOTE ADULT - SUBJECTIVE AND OBJECTIVE BOX
[facility] [phone]        Patient seen and examined lying in bed. family at bedside.  subrpubic cath noted  right chest wall HD   s/p HD yesterday 700ml removed.         MEDICATIONS  (STANDING):  albuterol/ipratropium for Nebulization 3 milliLiter(s) Nebulizer every 6 hours  aspirin  chewable 81 milliGRAM(s) Oral daily  atorvastatin 40 milliGRAM(s) Oral at bedtime  chlorhexidine 2% Cloths 1 Application(s) Topical daily  clopidogrel Tablet 75 milliGRAM(s) Oral daily  guaiFENesin ER 1200 milliGRAM(s) Oral every 12 hours  hydrOXYzine hydrochloride 25 milliGRAM(s) Oral three times a day  ibuprofen  Tablet. 400 milliGRAM(s) Oral every 12 hours  mupirocin 2% Ointment 1 Application(s) Topical <User Schedule>  oxyCODONE  ER Tablet 30 milliGRAM(s) Oral every 12 hours  pantoprazole    Tablet 40 milliGRAM(s) Oral before breakfast  piperacillin/tazobactam IVPB.. 3.375 Gram(s) IV Intermittent every 12 hours  polyethylene glycol 3350 17 Gram(s) Oral daily  senna 2 Tablet(s) Oral at bedtime      VITAL:  T(C): , Max: 36.7 (03-15-25 @ 01:02)  T(F): , Max: 98.1 (03-15-25 @ 01:02)  HR: 83 (03-15-25 @ 15:14)  BP: 140/55 (03-15-25 @ 15:14)  BP(mean): --  RR: 18 (03-15-25 @ 15:14)  SpO2: 98% (03-15-25 @ 15:14)  Wt(kg): --    I and O's:     @ 07:01  -  03-15 @ 07:00  --------------------------------------------------------  IN: 0 mL / OUT: 700 mL / NET: -700 mL          PHYSICAL EXAM:    Constitutional: NAD  Neck:  No JVD  Respiratory: CTAB/L  Cardiovascular: S1 and S2  Gastrointestinal: BS+, soft, NT/ND  Extremities: cyanotic   Neurological: A/O x 3, no focal deficits  Psychiatric: Normal mood, normal affect  : suprapubic mcconnell  Skin: No rashes  Access: right chest wall HD cath    LABS:                        9.0    11.98 )-----------( 216      ( 15 Mar 2025 10:30 )             31.2                 Urine Studies:  Urinalysis Basic - ( 14 Mar 2025 18:47 )    Color: Yellow / Appearance: Cloudy / S.007 / pH: x  Gluc: x / Ketone: Negative mg/dL  / Bili: Negative / Urobili: 0.2 mg/dL   Blood: x / Protein: 30 mg/dL / Nitrite: Negative   Leuk Esterase: Large / RBC: 226 /HPF /  /HPF   Sq Epi: x / Non Sq Epi: 0 /HPF / Bacteria: Occasional /HPF            RADIOLOGY & ADDITIONAL STUDIES:        IMPRESSION: 74M w/ polio, neurogenic bladder/suprapubic catheter, iatrogenic rectal rupture requiring colostomy 2023, and CKD5, 26 admitted with uremia and s/p fall; now newly ESRD-HD    (1)Renal - newly ESRD-HD as of this admission. Due for next HD today    (2)Hypophosphatemia - now off PO4 binders and off dietary PO4 restriction    (3)Anemia - s/p IV iron; on Retacrit with HD    (4)PAD - s/p LE bypass 3/3/25 - cyanotic changes of toes b/l - for further management after discharge    (5) - s/p suprapubic cath change 3/10/25    (6)CV - mildly hypertensive/starting to accumulate fluid - we should attempt some level of UF with HD from this point forward      RECOMMEND:   (1)HD Mon - 0.7kg UF as able; Retacrit with HD  -BMP/Mg/Phos daily  -awaiting labs  (2)D/C planning per primary team            Renea Bolanos NP  [facility]

## 2025-03-15 NOTE — PROGRESS NOTE ADULT - PROBLEM SELECTOR PLAN 6
-Monitor leukocytosis/fever curve  -CXR as above, no clear infiltrate  -ID f/u.  3/9: WBC slowly downtrending  3/10: con tto downtrend  3/11: wbc is slightly high o fver;  3/12: wbc increased further   no fever:  3/13: WBC spontaneously downtrended today  3/14: wbc slightly high ; no fever;  3/15; started on zosyn

## 2025-03-15 NOTE — PROGRESS NOTE ADULT - PROBLEM SELECTOR PLAN 4
-Per vascular  -S/p b/l iliac stent placement on 3/3/25  -Podiatry f/u.  3/9: has cyanosed digits:  defer to vascular  /: vascular following  3/123: per vascular surgery  3/13: still with discolored digits:  defer to vascular  3/14; per primary  team  3/15; vascular following : antibiotics added today by ID

## 2025-03-15 NOTE — PROGRESS NOTE ADULT - ASSESSMENT

## 2025-03-15 NOTE — PROGRESS NOTE ADULT - ASSESSMENT
74 year old male with CKD, sp polio, paraplegia with associated neurogenic bladder s/p suprapubic catheter who was admitted s/p fall on 2/16.   CKD - ESRD, now s/p DEYA [place] 2/17, on HD  ruled out cellulitis around suprapubic cath  2/20 s/p RRT for tremors and confusion -- pt with chronic tremors although notably worse  hyponatremia    mrsa negative   2/20 CXR with clear lungs   SPC site with chronic/stable inflammatory changes, no drainage - no sign of SSTI  CTA abd with occlusion of b/l external iliac arteries and b/l superficial femoral arteries with distal reconstitution at popliteal arteries; patent three vessel runoff of RLE with 2 vessel runoff of LLE with occlusion of L mid anterior tibial artery with distal reconstitution  Bcx negative to date   mental status at baseline  Vascular following for worsening PAD with worsening ischemic changes   -3/3 s/p RLE angiogram b/l iliac artery stents   WBC noted-stable, remains afebrile     3/15  pt reports significant pain yesterday at suprapubic catheter site  reports catheter was exchanged yesterday  states feels much better today  no visible erythema at catheter site on exam    Recommendations:   can c/w zosyn for now  f/u urine culture  HD per renal    Monitor temps/WBC  Continue rest of care per primary team     Juma Angela M.D.  [facility]  [phone]  After 5pm on weekdays and all day on weekends - please call [phone]  Available on [employer] teams    Thank you for consulting us and involving us in the management of this patients case. In addition to reviewing history, imaging, documents, labs, microbiology, took into account antibiotic stewardship, local antibiogram and infection control strategies and potential transmission issues.

## 2025-03-15 NOTE — PROGRESS NOTE ADULT - SUBJECTIVE AND OBJECTIVE BOX
Patient is a 74y old  Male who presents with a chief complaint of ESRD requiring HD, uremia (15 Mar 2025 21:09)      SUBJECTIVE / OVERNIGHT EVENTS: spoke w ptn's daughter today re denial from [employer] for [facility]. [facility], which [employer] doesnt accept. i recommended to speak  to Cm and to the insurance company to find participating facilities. ptn is stable today. pain and erythema at SP catheter site has resolved today. seen by ID, will cont ZOSYN for now. UCx sent.     MEDICATIONS  (STANDING):  albuterol/ipratropium for Nebulization 3 milliLiter(s) Nebulizer every 6 hours  aspirin  chewable 81 milliGRAM(s) Oral daily  atorvastatin 40 milliGRAM(s) Oral at bedtime  chlorhexidine 2% Cloths 1 Application(s) Topical daily  clopidogrel Tablet 75 milliGRAM(s) Oral daily  guaiFENesin ER 1200 milliGRAM(s) Oral every 12 hours  hydrOXYzine hydrochloride 25 milliGRAM(s) Oral three times a day  ibuprofen  Tablet. 400 milliGRAM(s) Oral every 12 hours  mupirocin 2% Ointment 1 Application(s) Topical <User Schedule>  oxyCODONE  ER Tablet 30 milliGRAM(s) Oral every 12 hours  pantoprazole    Tablet 40 milliGRAM(s) Oral before breakfast  piperacillin/tazobactam IVPB.. 3.375 Gram(s) IV Intermittent every 12 hours  polyethylene glycol 3350 17 Gram(s) Oral daily  senna 2 Tablet(s) Oral at bedtime    MEDICATIONS  (PRN):  acetaminophen     Tablet .. 650 milliGRAM(s) Oral every 6 hours PRN Temp greater or equal to 38C (100.4F), Mild Pain (1 - 3)  benzocaine/menthol Lozenge 1 Lozenge Oral three times a day PRN Sore Throat  bisacodyl 5 milliGRAM(s) Oral every 12 hours PRN Constipation  oxyCODONE    IR 10 milliGRAM(s) Oral every 4 hours PRN Moderate Pain (4 - 6)  sodium chloride 0.9% lock flush 10 milliLiter(s) IV Push every 1 hour PRN Pre/post blood products, medications, blood draw, and to maintain line patency      Vital Signs Last 24 Hrs  T(F): 97.8 (03-15-25 @ 15:14), Max: 98.1 (03-15-25 @ 01:02)  HR: 83 (03-15-25 @ 15:14) (80 - 83)  BP: 140/55 (03-15-25 @ 15:14) (130/59 - 152/85)  RR: 18 (03-15-25 @ 15:14) (18 - 18)  SpO2: 98% (03-15-25 @ 15:14) (98% - 99%)  Telemetry:   CAPILLARY BLOOD GLUCOSE        I&O's Summary    14 Mar 2025 07:01  -  15 Mar 2025 07:00  --------------------------------------------------------  IN: 0 mL / OUT: 700 mL / NET: -700 mL    15 Mar 2025 07:01  -  15 Mar 2025 22:05  --------------------------------------------------------  IN: 0 mL / OUT: 350 mL / NET: -350 mL        PHYSICAL EXAM:  GENERAL: NAD, well-developed  HEAD:  Atraumatic, Normocephalic  EYES: EOMI, PERRLA, conjunctiva and sclera clear  NECK: Supple, No JVD  CHEST/LUNG: Clear to auscultation bilaterally; No wheeze  HEART: Regular rate and rhythm; No murmurs, rubs, or gallops  ABDOMEN: Soft, Nontender, Nondistended; Bowel sounds present  EXTREMITIES:  2+ Peripheral Pulses, No clubbing, cyanosis, or edema  PSYCH: AAOx3  NEUROLOGY: non-focal  SKIN: No rashes or lesions    LABS:                        9.0    11.98 )-----------( 216      ( 15 Mar 2025 10:30 )             31.2     03-15    134[L]  |  99  |  23  ----------------------------<  161[H]  5.3   |  22  |  2.84[H]    Ca    7.4[L]      15 Mar 2025 19:01            Urinalysis Basic - ( 15 Mar 2025 19:01 )    Color: x / Appearance: x / SG: x / pH: x  Gluc: 161 mg/dL / Ketone: x  / Bili: x / Urobili: x   Blood: x / Protein: x / Nitrite: x   Leuk Esterase: x / RBC: x / WBC x   Sq Epi: x / Non Sq Epi: x / Bacteria: x        RADIOLOGY & ADDITIONAL TESTS:    Imaging Personally Reviewed:    Consultant(s) Notes Reviewed:      Care Discussed with Consultants/Other Providers:

## 2025-03-15 NOTE — PROGRESS NOTE ADULT - SUBJECTIVE AND OBJECTIVE BOX
[facility]  SABINO Griffin Y. Patel, S. Shah, G. Casimir  [phone]  ([phone] - weekdays after 5pm and weekends)    Name: BRIAN DEMPSEY  Age/Gender: 74y Male  MRN: [id number]    Interval History:  Notes reviewed.   Afebrile.   pt reports significant pain yesterday at suprapubic catheter site  reports catheter was exchanged yesterday  states feels much better today    Allergies: No Known Allergies      Objective:  Vitals:   T(F): 98.1 (03-15-25 @ 08:55), Max: 98.1 (03-15-25 @ 01:02)  HR: 81 (03-15-25 @ 08:55) (80 - 101)  BP: 130/59 (03-15-25 @ 08:55) (103/62 - 152/85)  RR: 18 (03-15-25 @ 01:02) (18 - 18)  SpO2: 99% (03-15-25 @ 01:02) (96% - 99%)  Physical Examination:  General: no acute distress  HEENT: anicteric  Cardio: S1, S2, normal rate, R chest permcath  Resp: clear to auscultation anteriorly  Abd: soft, NT, ND, no suprapubic tenderness, suprapubic catheter, no erythema  Ext: no LE edema  Skin: warm, dry    Laboratory Studies:  CBC:                       9.0    11.98 )-----------( 216      ( 15 Mar 2025 10:30 )             31.2     WBC Trend:  11.98 03-15-25 @ 10:30  13.26 25 @ 10:04  12.93 25 @ 09:58  15.12 25 @ 08:49  12.90 25 @ 09:50  11.58 25 @ 07:09    CMP:             Urinalysis Basic - ( 14 Mar 2025 18:47 )    Color: Yellow / Appearance: Cloudy / S.007 / pH: x  Gluc: x / Ketone: Negative mg/dL  / Bili: Negative / Urobili: 0.2 mg/dL   Blood: x / Protein: 30 mg/dL / Nitrite: Negative   Leuk Esterase: Large / RBC: 226 /HPF /  /HPF   Sq Epi: x / Non Sq Epi: 0 /HPF / Bacteria: Occasional /HPF      Microbiology: reviewed       Radiology: reviewed     Medications:  acetaminophen     Tablet .. 650 milliGRAM(s) Oral every 6 hours PRN  albuterol/ipratropium for Nebulization 3 milliLiter(s) Nebulizer every 6 hours  aspirin  chewable 81 milliGRAM(s) Oral daily  atorvastatin 40 milliGRAM(s) Oral at bedtime  benzocaine/menthol Lozenge 1 Lozenge Oral three times a day PRN  bisacodyl 5 milliGRAM(s) Oral every 12 hours PRN  chlorhexidine 2% Cloths 1 Application(s) Topical daily  clopidogrel Tablet 75 milliGRAM(s) Oral daily  guaiFENesin ER 1200 milliGRAM(s) Oral every 12 hours  hydrOXYzine hydrochloride 25 milliGRAM(s) Oral three times a day  ibuprofen  Tablet. 400 milliGRAM(s) Oral every 12 hours  mupirocin 2% Ointment 1 Application(s) Topical <User Schedule>  oxyCODONE    IR 10 milliGRAM(s) Oral every 4 hours PRN  oxyCODONE  ER Tablet 30 milliGRAM(s) Oral every 12 hours  pantoprazole    Tablet 40 milliGRAM(s) Oral before breakfast  piperacillin/tazobactam IVPB.. 3.375 Gram(s) IV Intermittent every 12 hours  polyethylene glycol 3350 17 Gram(s) Oral daily  senna 2 Tablet(s) Oral at bedtime  sodium chloride 0.9% lock flush 10 milliLiter(s) IV Push every 1 hour PRN    Antimicrobials:  piperacillin/tazobactam IVPB.. 3.375 Gram(s) IV Intermittent every 12 hours

## 2025-03-15 NOTE — PROGRESS NOTE ADULT - SUBJECTIVE AND OBJECTIVE BOX
Subjective: Patient seen and examined. No new events except as noted.     REVIEW OF SYSTEMS:    CONSTITUTIONAL: + weakness, fevers or chills  EYES/ENT: No visual changes;  No vertigo or throat pain   NECK: No pain or stiffness  RESPIRATORY: No cough, wheezing, hemoptysis; No shortness of breath  CARDIOVASCULAR: No chest pain or palpitations  GASTROINTESTINAL: No abdominal or epigastric pain. No nausea, vomiting, or hematemesis; No diarrhea or constipation. No melena or hematochezia.  GENITOURINARY: No dysuria, frequency or hematuria  NEUROLOGICAL: No numbness or weakness  SKIN: No itching, burning, rashes, or lesions   All other review of systems is negative unless indicated above.    MEDICATIONS:  MEDICATIONS  (STANDING):  albuterol/ipratropium for Nebulization 3 milliLiter(s) Nebulizer every 6 hours  aspirin  chewable 81 milliGRAM(s) Oral daily  atorvastatin 40 milliGRAM(s) Oral at bedtime  chlorhexidine 2% Cloths 1 Application(s) Topical daily  clopidogrel Tablet 75 milliGRAM(s) Oral daily  guaiFENesin ER 1200 milliGRAM(s) Oral every 12 hours  hydrOXYzine hydrochloride 25 milliGRAM(s) Oral three times a day  ibuprofen  Tablet. 400 milliGRAM(s) Oral every 12 hours  mupirocin 2% Ointment 1 Application(s) Topical <User Schedule>  oxyCODONE  ER Tablet 30 milliGRAM(s) Oral every 12 hours  pantoprazole    Tablet 40 milliGRAM(s) Oral before breakfast  piperacillin/tazobactam IVPB.. 3.375 Gram(s) IV Intermittent every 12 hours  polyethylene glycol 3350 17 Gram(s) Oral daily  senna 2 Tablet(s) Oral at bedtime      PHYSICAL EXAM:  T(C): 36.6 (03-15-25 @ 15:14), Max: 36.7 (03-15-25 @ 01:02)  HR: 83 (03-15-25 @ 15:14) (80 - 83)  BP: 140/55 (03-15-25 @ 15:14) (130/59 - 152/85)  RR: 18 (03-15-25 @ 15:14) (18 - 18)  SpO2: 98% (03-15-25 @ 15:14) (98% - 99%)  Wt(kg): --  I&O's Summary    14 Mar 2025 07:01  -  15 Mar 2025 07:00  --------------------------------------------------------  IN: 0 mL / OUT: 700 mL / NET: -700 mL        Appearance: NAD  HEENT:  Dry oral mucosa, PERRL, EOMI	  Lymphatic: No lymphadenopathy  Cardiovascular: Normal S1 S2, No JVD, No murmurs, No edema  Respiratory: Lungs clear to auscultation	  Psychiatry: A & O x 3, Mood & affect appropriate  Skin: No rashes, No ecchymoses, No cyanosis	  Neurologic: Non-focal  GI/Abd: Soft, obese, nontender. Dressing to [facility] C/D/I. Suprapubic catheter in place  Ext: Palp femoral pulses bilat. BLE atrophic, minimal dorsi/plantarflexion bilaterally. Sensation grossly intact. LLE edematous compared to R, erythema to right toes/forefoot. mottling of right toes/forefoot/heel  LLE:  small superficial abrasion, +edema and +ecchymosis over L knee  +TTP over L knee, no TTP along remainder of extremity; compartments soft  Limited ROM at knee 2/2 pain  No gross varus/valgus laxity, but assessment limited 2/2 pain  Motor: TA/EHL/GS/FHL intact  Sensory: DP/SP/Tib/Jordan/Saph SILT  +DP pulse (symmetric relative to contralateral side), WWP   pressure wound from the LLE immobilizer posteriorly proximal to the knee.  Vascular: Peripheral pulses palpable 2+ bilaterally        LABS:    CARDIAC MARKERS:                                9.0    11.98 )-----------( 216      ( 15 Mar 2025 10:30 )             31.2     03-15    134[L]  |  99  |  23  ----------------------------<  161[H]  5.3   |  22  |  2.84[H]    Ca    7.4[L]      15 Mar 2025 19:01      proBNP:   Lipid Profile:   HgA1c:   TSH:     Negative          TELEMETRY: 	    ECG:  	  RADIOLOGY:   DIAGNOSTIC TESTING:  [ ] Echocardiogram:  [ ]  Catheterization:  [ ] Stress Test:    OTHER:

## 2025-03-15 NOTE — PROGRESS NOTE ADULT - PROBLEM SELECTOR PLAN 1
pulm wise he remains ok ; on room air : ct chest showed: Mucoid impaction and airway associated subpleural groundglass opacities, most prominent in left lower lobe and to a lesser degree in right lower lobe. Mild bronchial wall thickening, likely inflammatory.  clinically he seems to be doing ok : no resp symptoms:  he would need repeat ct scan chest  in 6 weeks time  3/15: no new change  ; on room air;  alert and awake

## 2025-03-15 NOTE — PROGRESS NOTE ADULT - ASSESSMENT
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 The initial plan was that he would present to the [facility] ER Monday 2/17 for HD initiation. However, day of admission, he fell and sustained trauma to his knee. Called to consult for cough, elevated R hemidiaphragm.

## 2025-03-15 NOTE — PROGRESS NOTE ADULT - PROBLEM SELECTOR PLAN 3
-Neuro following  -ABG 2/20 acceptable  -Pt lethargic 2/22, ABG never sent but AMS appears to be improving   -ABG 2/28 with no co2 retention.  3/9: he seems pretty alert and awake and responsive appropriately to questions  /: resolved:   he is alert sand awke and responsive to questions well : needs pt ot  3/11 : seems to be doing  ok : no sob:  on room air  3/12: resolved  3/14: he is totally alert and awake and responsive to questions  3/15 resolved

## 2025-03-16 NOTE — PROGRESS NOTE ADULT - SUBJECTIVE AND OBJECTIVE BOX
Subjective: Patient seen and examined. No new events except as noted.     REVIEW OF SYSTEMS:    CONSTITUTIONAL: +weakness, fevers or chills  EYES/ENT: No visual changes;  No vertigo or throat pain   NECK: No pain or stiffness  RESPIRATORY: No cough, wheezing, hemoptysis; No shortness of breath  CARDIOVASCULAR: No chest pain or palpitations  GASTROINTESTINAL: No abdominal or epigastric pain. No nausea, vomiting, or hematemesis; No diarrhea or constipation. No melena or hematochezia.  GENITOURINARY: No dysuria, frequency or hematuria  NEUROLOGICAL: No numbness or weakness  SKIN: No itching, burning, rashes, or lesions   All other review of systems is negative unless indicated above.    MEDICATIONS:  MEDICATIONS  (STANDING):  albuterol/ipratropium for Nebulization 3 milliLiter(s) Nebulizer every 6 hours  aspirin  chewable 81 milliGRAM(s) Oral daily  atorvastatin 40 milliGRAM(s) Oral at bedtime  chlorhexidine 2% Cloths 1 Application(s) Topical daily  clopidogrel Tablet 75 milliGRAM(s) Oral daily  guaiFENesin ER 1200 milliGRAM(s) Oral every 12 hours  hydrOXYzine hydrochloride 25 milliGRAM(s) Oral three times a day  ibuprofen  Tablet. 400 milliGRAM(s) Oral every 12 hours  mupirocin 2% Ointment 1 Application(s) Topical <User Schedule>  oxyCODONE  ER Tablet 30 milliGRAM(s) Oral every 12 hours  pantoprazole    Tablet 40 milliGRAM(s) Oral before breakfast  piperacillin/tazobactam IVPB.. 3.375 Gram(s) IV Intermittent every 12 hours  polyethylene glycol 3350 17 Gram(s) Oral daily  senna 2 Tablet(s) Oral at bedtime      PHYSICAL EXAM:  T(C): 36.9 (03-16-25 @ 00:00), Max: 36.9 (03-16-25 @ 00:00)  HR: 82 (03-16-25 @ 00:00) (82 - 83)  BP: 107/70 (03-16-25 @ 00:00) (107/70 - 140/55)  RR: 18 (03-16-25 @ 00:00) (18 - 18)  SpO2: 96% (03-16-25 @ 00:00) (96% - 98%)  Wt(kg): --  I&O's Summary    15 Mar 2025 07:01  -  16 Mar 2025 07:00  --------------------------------------------------------  IN: 0 mL / OUT: 550 mL / NET: -550 mL          Appearance: NAD  HEENT:  Dry oral mucosa, PERRL, EOMI	  Lymphatic: No lymphadenopathy  Cardiovascular: Normal S1 S2, No JVD, No murmurs, No edema  Respiratory: Lungs clear to auscultation	  Psychiatry: A & O x 3, Mood & affect appropriate  Skin: No rashes, No ecchymoses, No cyanosis	  Neurologic: Non-focal  GI/Abd: Soft, obese, nontender. Dressing to [facility] C/D/I. Suprapubic catheter in place  Ext: Palp femoral pulses bilat. BLE atrophic, minimal dorsi/plantarflexion bilaterally. Sensation grossly intact. LLE edematous compared to R, erythema to right toes/forefoot. mottling of right toes/forefoot/heel  LLE:  small superficial abrasion, +edema and +ecchymosis over L knee  +TTP over L knee, no TTP along remainder of extremity; compartments soft  Limited ROM at knee 2/2 pain  No gross varus/valgus laxity, but assessment limited 2/2 pain  Motor: TA/EHL/GS/FHL intact  Sensory: DP/SP/Tib/Jordan/Saph SILT  +DP pulse (symmetric relative to contralateral side), WWP   pressure wound from the LLE immobilizer posteriorly proximal to the knee.  Vascular: Peripheral pulses palpable 2+ bilaterally      LABS:    CARDIAC MARKERS:                                9.0    11.98 )-----------( 216      ( 15 Mar 2025 10:30 )             31.2     03-15    134[L]  |  99  |  23  ----------------------------<  161[H]  5.3   |  22  |  2.84[H]    Ca    7.4[L]      15 Mar 2025 19:01      proBNP:   Lipid Profile:   HgA1c:   TSH:     Negative          TELEMETRY: 	    ECG:  	  RADIOLOGY:   DIAGNOSTIC TESTING:  [ ] Echocardiogram:  [ ]  Catheterization:  [ ] Stress Test:    OTHER:

## 2025-03-16 NOTE — PROGRESS NOTE ADULT - ASSESSMENT
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  came in after fall and for HD.     2/20 CTH neg   \Na 123 --> now 133   A1c 5.7   TSH WNL 3.46   o/e 2/21 AAOx2, uppers 4/5, lowers limited .  2/23; family bedside upset that he has pain in leg after he was moved.  patient going for imaging now.   s/p R IJ permacath by IR 2/25 2/27 AAOx3   sopoke with family bedisde 3/12 mental status baseline     Imrpssion  1) AMS, waxing and waing , likely multifactorial from infection, metabolic/electrolyte derrangements/ delerium / medication effect , ureemia which is imporving   2) polio  3) fall 2/2 weakness  4) hyponatremia to 123 2/20  improved 133 -->136     - AVF outpatient     f/u vascular; no vascluar options ; f/u with Messi from vasculare to schedule outpatient procedure   - on DAPT   - was getting oxycodone ER 30mg BID and oxy PRN IR i10 and dilauded PRN ;   - f/u psych   - limit sedating meds   - continue to monitor and correct metabolic derrangements .  - delerium precuations.   - frequent re orientation  - check b12, RPR, ammonia level   - will consdier MRI brain or EEG if doesn't improve but seems to have been improving   - PT/OT   - check FS, glucose control <180  - GI/DVT ppx  - Thank you for allowing me to participate in the care of this patient. Call with questions.    spoke with wife 3/16  dc planning NYDIA needs auth ; Freddie Limon MD  Vascular Neurology  Office: [phone]

## 2025-03-16 NOTE — PROGRESS NOTE ADULT - ASSESSMENT

## 2025-03-16 NOTE — PROGRESS NOTE ADULT - SUBJECTIVE AND OBJECTIVE BOX
Date of Service: 03-16-25 @ 13:54    Patient is a 74y old  Male who presents with a chief complaint of ESRD requiring HD, uremia (16 Mar 2025 10:52)      Any change in ROS: doing very well : on room air:  no cough , no phlegm      MEDICATIONS  (STANDING):  albuterol/ipratropium for Nebulization 3 milliLiter(s) Nebulizer every 6 hours  aspirin  chewable 81 milliGRAM(s) Oral daily  atorvastatin 40 milliGRAM(s) Oral at bedtime  chlorhexidine 2% Cloths 1 Application(s) Topical daily  clopidogrel Tablet 75 milliGRAM(s) Oral daily  guaiFENesin ER 1200 milliGRAM(s) Oral every 12 hours  hydrOXYzine hydrochloride 25 milliGRAM(s) Oral three times a day  ibuprofen  Tablet. 400 milliGRAM(s) Oral every 12 hours  mupirocin 2% Ointment 1 Application(s) Topical <User Schedule>  oxyCODONE  ER Tablet 30 milliGRAM(s) Oral every 12 hours  pantoprazole    Tablet 40 milliGRAM(s) Oral before breakfast  polyethylene glycol 3350 17 Gram(s) Oral daily  senna 2 Tablet(s) Oral at bedtime    MEDICATIONS  (PRN):  acetaminophen     Tablet .. 650 milliGRAM(s) Oral every 6 hours PRN Temp greater or equal to 38C (100.4F), Mild Pain (1 - 3)  benzocaine/menthol Lozenge 1 Lozenge Oral three times a day PRN Sore Throat  bisacodyl 5 milliGRAM(s) Oral every 12 hours PRN Constipation  oxyCODONE    IR 10 milliGRAM(s) Oral every 4 hours PRN Moderate Pain (4 - 6)  sodium chloride 0.9% lock flush 10 milliLiter(s) IV Push every 1 hour PRN Pre/post blood products, medications, blood draw, and to maintain line patency    Vital Signs Last 24 Hrs  T(C): 37.3 (16 Mar 2025 07:15), Max: 37.3 (16 Mar 2025 07:15)  T(F): 99.1 (16 Mar 2025 07:15), Max: 99.1 (16 Mar 2025 07:15)  HR: 81 (16 Mar 2025 07:15) (81 - 83)  BP: 147/79 (16 Mar 2025 07:15) (107/70 - 147/79)  BP(mean): --  RR: 18 (16 Mar 2025 07:15) (18 - 18)  SpO2: 97% (16 Mar 2025 07:15) (96% - 98%)    Parameters below as of 16 Mar 2025 07:15  Patient On (Oxygen Delivery Method): room air        I&O's Summary    15 Mar 2025 07:01  -  16 Mar 2025 07:00  --------------------------------------------------------  IN: 0 mL / OUT: 550 mL / NET: -550 mL          Physical Exam:   GENERAL: NAD, well-groomed, well-developed  HEENT: VINNY/   Atraumatic, Normocephalic  ENMT: No tonsillar erythema, exudates, or enlargement; Moist mucous membranes, Good dentition, No lesions  NECK: Supple, No JVD, Normal thyroid  CHEST/LUNG: Clear to auscultaion, ; No rales, rhonchi, wheezing, or rubs  CVS: Regular rate and rhythm; No murmurs, rubs, or gallops  GI: : Soft, Nontender, Nondistended; Bowel sounds present  NERVOUS SYSTEM:  Alert & Oriented X3  EXTREMITIES: cyanosed toes  bilaterally;   LYMPH: No lymphadenopathy noted  SKIN: No rashes or lesions  ENDOCRINOLOGY: No Thyromegaly  PSYCH: Appropriate    Labs:                              9.2    10.14 )-----------( 203      ( 16 Mar 2025 11:40 )             31.9                         9.0    11.98 )-----------( 216      ( 15 Mar 2025 10:30 )             31.2                         9.9    13.26 )-----------( 254      ( 14 Mar 2025 10:04 )             33.8                         8.7    12.93 )-----------( 263      ( 13 Mar 2025 09:58 )             29.7     03-16    135  |  98  |  28[H]  ----------------------------<  148[H]  4.2   |  23  |  3.43[H]  03-15    134[L]  |  99  |  23  ----------------------------<  161[H]  5.3   |  22  |  2.84[H]    Ca    7.6[L]      16 Mar 2025 11:40  Ca    7.4[L]      15 Mar 2025 19:01  Phos  2.8     03-16  Mg     2.1     03-16      CAPILLARY BLOOD GLUCOSE              Urinalysis Basic - ( 16 Mar 2025 11:40 )    Color: x / Appearance: x / SG: x / pH: x  Gluc: 148 mg/dL / Ketone: x  / Bili: x / Urobili: x   Blood: x / Protein: x / Nitrite: x   Leuk Esterase: x / RBC: x / WBC x   Sq Epi: x / Non Sq Epi: x / Bacteria: x            RECENT CULTURES:  03-15 @ 07:08 Catheterized Catheterized     rad< from: Xray Chest 1 View- PORTABLE-Urgent (Xray Chest 1 View- PORTABLE-Urgent .) (03.01.25 @ 22:48) >  rontal, portable view of the chest was obtained.    COMPARISON: Chest x-ray 2/20/2025.    FINDINGS:  Right-sided tunneled hemodialysis catheter terminates in the SVC.  The heart is not accurately assessed in this AP projection.  The lungs are clear.  There is no pneumothorax or pleural effusion.  No acute bony abnormality.    IMPRESSION:  Clear lungs.    --- End of Report ---          GILBERT LEON MD; Resident Radiologist  This document has been electronically signed.   JESUS HALEY DO; Attending Interventional Radiologist  This document has been electronically signed. Mar  2 2025  9:37AM    < end of copied text >             <10,000 CFU/mL Normal Urogenital Catie          RESPIRATORY CULTURES:          Studies  Chest X-RAY  CT SCAN Chest   Venous Dopplers: LE:   CT Abdomen  Others

## 2025-03-16 NOTE — PROGRESS NOTE ADULT - SUBJECTIVE AND OBJECTIVE BOX
[facility]  SABINO Griffin Y. Patel, S. Shah, G. Casimir  [phone]  ([phone] - weekdays after 5pm and weekends)    Name: BRIAN DEMPSEY  Age/Gender: 74y Male  MRN: [id number]    Interval History:  Notes reviewed.   No concerning overnight events.  Afebrile.   reports RLQ abd pain today    Allergies: No Known Allergies      Objective:  Vitals:   T(F): 99.1 (03-16-25 @ 07:15), Max: 99.1 (03-16-25 @ 07:15)  HR: 81 (03-16-25 @ 07:15) (81 - 83)  BP: 147/79 (03-16-25 @ 07:15) (107/70 - 147/79)  RR: 18 (03-16-25 @ 07:15) (18 - 18)  SpO2: 97% (03-16-25 @ 07:15) (96% - 98%)  Physical Examination:  General: no acute distress  HEENT: anicteric  Cardio: S1, S2, normal rate, R chest permcath  Resp: breathing comfortably on RA   Abd: soft, NT, ND, no suprapubic tenderness, suprapubic catheter, no erythema  Ext: no LE edema  Skin: warm, dry    Laboratory Studies:  CBC:                       9.0    11.98 )-----------( 216      ( 15 Mar 2025 10:30 )             31.2     WBC Trend:  11.98 03-15-25 @ 10:30  13.26 03-14-25 @ 10:04  12.93 03-13-25 @ 09:58  15.12 03-12-25 @ 08:49  12.90 03-11-25 @ 09:50    CMP: 03-15    134[L]  |  99  |  23  ----------------------------<  161[H]  5.3   |  22  |  2.84[H]    Ca    7.4[L]      15 Mar 2025 19:01            Urinalysis Basic - ( 15 Mar 2025 19:01 )    Color: x / Appearance: x / SG: x / pH: x  Gluc: 161 mg/dL / Ketone: x  / Bili: x / Urobili: x   Blood: x / Protein: x / Nitrite: x   Leuk Esterase: x / RBC: x / WBC x   Sq Epi: x / Non Sq Epi: x / Bacteria: x      Microbiology: reviewed     Culture - Urine (collected 03-15-25 @ 07:08)  Source: Catheterized Catheterized  Final Report (03-16-25 @ 08:38):    <10,000 CFU/mL Normal Urogenital Catie        Radiology: reviewed     Medications:  acetaminophen     Tablet .. 650 milliGRAM(s) Oral every 6 hours PRN  albuterol/ipratropium for Nebulization 3 milliLiter(s) Nebulizer every 6 hours  aspirin  chewable 81 milliGRAM(s) Oral daily  atorvastatin 40 milliGRAM(s) Oral at bedtime  benzocaine/menthol Lozenge 1 Lozenge Oral three times a day PRN  bisacodyl 5 milliGRAM(s) Oral every 12 hours PRN  chlorhexidine 2% Cloths 1 Application(s) Topical daily  clopidogrel Tablet 75 milliGRAM(s) Oral daily  guaiFENesin ER 1200 milliGRAM(s) Oral every 12 hours  hydrOXYzine hydrochloride 25 milliGRAM(s) Oral three times a day  ibuprofen  Tablet. 400 milliGRAM(s) Oral every 12 hours  mupirocin 2% Ointment 1 Application(s) Topical <User Schedule>  oxyCODONE    IR 10 milliGRAM(s) Oral every 4 hours PRN  oxyCODONE  ER Tablet 30 milliGRAM(s) Oral every 12 hours  pantoprazole    Tablet 40 milliGRAM(s) Oral before breakfast  piperacillin/tazobactam IVPB.. 3.375 Gram(s) IV Intermittent every 12 hours  polyethylene glycol 3350 17 Gram(s) Oral daily  senna 2 Tablet(s) Oral at bedtime  sodium chloride 0.9% lock flush 10 milliLiter(s) IV Push every 1 hour PRN    Antimicrobials:  piperacillin/tazobactam IVPB.. 3.375 Gram(s) IV Intermittent every 12 hours   [facility]  SABINO Griffin Y. Patel, S. Shah, G. Casimir  [phone]  ([phone] - weekdays after 5pm and weekends)    Name: BRIAN DEMPSEY  Age/Gender: 74y Male  MRN: [id number]    Interval History:  Notes reviewed.   No concerning overnight events.  Afebrile.   reports RLQ abd pain today    Allergies: No Known Allergies      Objective:  Vitals:   T(F): 99.1 (03-16-25 @ 07:15), Max: 99.1 (03-16-25 @ 07:15)  HR: 81 (03-16-25 @ 07:15) (81 - 83)  BP: 147/79 (03-16-25 @ 07:15) (107/70 - 147/79)  RR: 18 (03-16-25 @ 07:15) (18 - 18)  SpO2: 97% (03-16-25 @ 07:15) (96% - 98%)  Physical Examination:  General: no acute distress  HEENT: anicteric  Cardio: S1, S2, normal rate, R chest permcath  Resp: breathing comfortably on RA   Abd: soft, mild RLQ tenderness, ND, no suprapubic tenderness, suprapubic catheter, no erythema  Ext: no LE edema  Skin: warm, dry    Laboratory Studies:  CBC:                       9.0    11.98 )-----------( 216      ( 15 Mar 2025 10:30 )             31.2     WBC Trend:  11.98 03-15-25 @ 10:30  13.26 03-14-25 @ 10:04  12.93 03-13-25 @ 09:58  15.12 03-12-25 @ 08:49  12.90 03-11-25 @ 09:50    CMP: 03-15    134[L]  |  99  |  23  ----------------------------<  161[H]  5.3   |  22  |  2.84[H]    Ca    7.4[L]      15 Mar 2025 19:01            Urinalysis Basic - ( 15 Mar 2025 19:01 )    Color: x / Appearance: x / SG: x / pH: x  Gluc: 161 mg/dL / Ketone: x  / Bili: x / Urobili: x   Blood: x / Protein: x / Nitrite: x   Leuk Esterase: x / RBC: x / WBC x   Sq Epi: x / Non Sq Epi: x / Bacteria: x      Microbiology: reviewed     Culture - Urine (collected 03-15-25 @ 07:08)  Source: Catheterized Catheterized  Final Report (03-16-25 @ 08:38):    <10,000 CFU/mL Normal Urogenital Catie        Radiology: reviewed     Medications:  acetaminophen     Tablet .. 650 milliGRAM(s) Oral every 6 hours PRN  albuterol/ipratropium for Nebulization 3 milliLiter(s) Nebulizer every 6 hours  aspirin  chewable 81 milliGRAM(s) Oral daily  atorvastatin 40 milliGRAM(s) Oral at bedtime  benzocaine/menthol Lozenge 1 Lozenge Oral three times a day PRN  bisacodyl 5 milliGRAM(s) Oral every 12 hours PRN  chlorhexidine 2% Cloths 1 Application(s) Topical daily  clopidogrel Tablet 75 milliGRAM(s) Oral daily  guaiFENesin ER 1200 milliGRAM(s) Oral every 12 hours  hydrOXYzine hydrochloride 25 milliGRAM(s) Oral three times a day  ibuprofen  Tablet. 400 milliGRAM(s) Oral every 12 hours  mupirocin 2% Ointment 1 Application(s) Topical <User Schedule>  oxyCODONE    IR 10 milliGRAM(s) Oral every 4 hours PRN  oxyCODONE  ER Tablet 30 milliGRAM(s) Oral every 12 hours  pantoprazole    Tablet 40 milliGRAM(s) Oral before breakfast  piperacillin/tazobactam IVPB.. 3.375 Gram(s) IV Intermittent every 12 hours  polyethylene glycol 3350 17 Gram(s) Oral daily  senna 2 Tablet(s) Oral at bedtime  sodium chloride 0.9% lock flush 10 milliLiter(s) IV Push every 1 hour PRN    Antimicrobials:  piperacillin/tazobactam IVPB.. 3.375 Gram(s) IV Intermittent every 12 hours

## 2025-03-16 NOTE — PROGRESS NOTE ADULT - ASSESSMENT
74 year old male with CKD, sp polio, paraplegia with associated neurogenic bladder s/p suprapubic catheter who was admitted s/p fall on 2/16.   CKD - ESRD, now s/p DEYA [place] 2/17, on HD  ruled out cellulitis around suprapubic cath  2/20 s/p RRT for tremors and confusion -- pt with chronic tremors although notably worse  hyponatremia    mrsa negative   2/20 CXR with clear lungs   SPC site with chronic/stable inflammatory changes, no drainage - no sign of SSTI  CTA abd with occlusion of b/l external iliac arteries and b/l superficial femoral arteries with distal reconstitution at popliteal arteries; patent three vessel runoff of RLE with 2 vessel runoff of LLE with occlusion of L mid anterior tibial artery with distal reconstitution  Bcx negative to date   mental status at baseline  Vascular following for worsening PAD with worsening ischemic changes   -3/3 s/p RLE angiogram b/l iliac artery stents   WBC noted-stable, remains afebrile     3/16  no tenderness at suprapubic catheter site  no visible erythema at catheter site on exam  UA w/ many epithelial cells, urine culture negative  has RLQ abd pain today    Recommendations:   urine culture negative, discontinue zosyn  consider CT abd/pelvis to evaluate abd pain if this persists  HD per renal    Monitor temps/WBC  Continue rest of care per primary team     Juma Angela M.D.  [facility]  [phone]  After 5pm on weekdays and all day on weekends - please call [phone]  Available on [employer] teams    Thank you for consulting us and involving us in the management of this patients case. In addition to reviewing history, imaging, documents, labs, microbiology, took into account antibiotic stewardship, local antibiogram and infection control strategies and potential transmission issues.

## 2025-03-16 NOTE — PROGRESS NOTE ADULT - SUBJECTIVE AND OBJECTIVE BOX
Patient is a 74y old  Male who presents with a chief complaint of ESRD requiring HD, uremia (16 Mar 2025 13:54)      SUBJECTIVE / OVERNIGHT EVENTS: urine cx from 3/15 NTD, zosyn DCed, awaiting dc to [facility], not sure will be able to go to [facility] since there is an insurance coverage conflict. HD as per renal    MEDICATIONS  (STANDING):  albuterol/ipratropium for Nebulization 3 milliLiter(s) Nebulizer every 6 hours  aspirin  chewable 81 milliGRAM(s) Oral daily  atorvastatin 40 milliGRAM(s) Oral at bedtime  chlorhexidine 2% Cloths 1 Application(s) Topical daily  clopidogrel Tablet 75 milliGRAM(s) Oral daily  guaiFENesin ER 1200 milliGRAM(s) Oral every 12 hours  hydrOXYzine hydrochloride 25 milliGRAM(s) Oral three times a day  ibuprofen  Tablet. 400 milliGRAM(s) Oral every 12 hours  mupirocin 2% Ointment 1 Application(s) Topical <User Schedule>  oxyCODONE  ER Tablet 30 milliGRAM(s) Oral every 12 hours  pantoprazole    Tablet 40 milliGRAM(s) Oral before breakfast  polyethylene glycol 3350 17 Gram(s) Oral daily  senna 2 Tablet(s) Oral at bedtime    MEDICATIONS  (PRN):  acetaminophen     Tablet .. 650 milliGRAM(s) Oral every 6 hours PRN Temp greater or equal to 38C (100.4F), Mild Pain (1 - 3)  benzocaine/menthol Lozenge 1 Lozenge Oral three times a day PRN Sore Throat  bisacodyl 5 milliGRAM(s) Oral every 12 hours PRN Constipation  oxyCODONE    IR 10 milliGRAM(s) Oral every 4 hours PRN Moderate Pain (4 - 6)  sodium chloride 0.9% lock flush 10 milliLiter(s) IV Push every 1 hour PRN Pre/post blood products, medications, blood draw, and to maintain line patency      Vital Signs Last 24 Hrs  T(F): 98.4 (03-16-25 @ 16:00), Max: 99.1 (03-16-25 @ 07:15)  HR: 86 (03-16-25 @ 16:00) (81 - 86)  BP: 137/70 (03-16-25 @ 16:00) (107/70 - 147/79)  RR: 18 (03-16-25 @ 16:00) (18 - 18)  SpO2: 98% (03-16-25 @ 16:00) (96% - 98%)  Telemetry:   CAPILLARY BLOOD GLUCOSE        I&O's Summary    15 Mar 2025 07:01  -  16 Mar 2025 07:00  --------------------------------------------------------  IN: 0 mL / OUT: 550 mL / NET: -550 mL        PHYSICAL EXAM:  GENERAL: NAD, well-developed  HEAD:  Atraumatic, Normocephalic  EYES: EOMI, PERRLA, conjunctiva and sclera clear  NECK: Supple, No JVD  CHEST/LUNG: Clear to auscultation bilaterally; No wheeze  HEART: Regular rate and rhythm; No murmurs, rubs, or gallops  ABDOMEN: Soft, Nontender, Nondistended; Bowel sounds present  EXTREMITIES:  2+ Peripheral Pulses, No clubbing, cyanosis, or edema  PSYCH: AAOx3  NEUROLOGY: non-focal  SKIN: No rashes or lesions    LABS:                        9.2    10.14 )-----------( 203      ( 16 Mar 2025 11:40 )             31.9     03-16    135  |  98  |  28[H]  ----------------------------<  148[H]  4.2   |  23  |  3.43[H]    Ca    7.6[L]      16 Mar 2025 11:40  Phos  2.8     03-16  Mg     2.1     03-16            Urinalysis Basic - ( 16 Mar 2025 11:40 )    Color: x / Appearance: x / SG: x / pH: x  Gluc: 148 mg/dL / Ketone: x  / Bili: x / Urobili: x   Blood: x / Protein: x / Nitrite: x   Leuk Esterase: x / RBC: x / WBC x   Sq Epi: x / Non Sq Epi: x / Bacteria: x        RADIOLOGY & ADDITIONAL TESTS:    Imaging Personally Reviewed:    Consultant(s) Notes Reviewed:      Care Discussed with Consultants/Other Providers:

## 2025-03-16 NOTE — PROGRESS NOTE ADULT - PROBLEM SELECTOR PLAN 1
pulm wise he remains ok ; on room air : ct chest showed: Mucoid impaction and airway associated subpleural groundglass opacities, most prominent in left lower lobe and to a lesser degree in right lower lobe. Mild bronchial wall thickening, likely inflammatory.  clinically he seems to be doing ok : no resp symptoms:  he would need repeat ct scan chest  in 6 weeks time  3/15: no new change  ; on room air;  alert and awake  3/16: no overnight resp events:   he is on room air:  lings are clear:  doing well ;p ulm wise:

## 2025-03-16 NOTE — PROGRESS NOTE ADULT - PROBLEM SELECTOR PLAN 6
-Monitor leukocytosis/fever curve  -CXR as above, no clear infiltrate  -ID f/u.  3/9: WBC slowly downtrending  3/10: con tto downtrend  3/11: wbc is slightly high o fver;  3/12: wbc increased further   no fever:  3/13: WBC spontaneously downtrended today  3/14: wbc slightly high ; no fever;  3/15; started on zosyn  3/16: urine culture is negative:  antibiotics dced by ID

## 2025-03-16 NOTE — PROGRESS NOTE ADULT - PROBLEM SELECTOR PLAN 4
-Per vascular  -S/p b/l iliac stent placement on 3/3/25  -Podiatry f/u.  3/9: has cyanosed digits:  defer to vascular  /: vascular following  3/123: per vascular surgery  3/13: still with discolored digits:  defer to vascular  3/14; per primary  team  3/15; vascular following : antibiotics added today by ID  3/16: cont current medications

## 2025-03-16 NOTE — PROGRESS NOTE ADULT - PROBLEM SELECTOR PLAN 3
-Neuro following  -ABG 2/20 acceptable  -Pt lethargic 2/22, ABG never sent but AMS appears to be improving   -ABG 2/28 with no co2 retention.  3/9: he seems pretty alert and awake and responsive appropriately to questions  /: resolved:   he is alert sand awke and responsive to questions well : needs pt ot  3/11 : seems to be doing  ok : no sob:  on room air  3/12: resolved  3/14: he is totally alert and awake and responsive to questions  3/15 resolved  3/16: as above

## 2025-03-16 NOTE — PROGRESS NOTE ADULT - ASSESSMENT
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 The initial plan was that he would present to the [facility] ER Monday 2/17 for HD initiation. However, day of admission, he fell and sustained trauma to his knee. Called to consult for cough, elevated R hemidiaphragm.

## 2025-03-17 NOTE — PROGRESS NOTE ADULT - SUBJECTIVE AND OBJECTIVE BOX
Date of Service: 03-17-25 @ 12:47    Patient is a 74y old  Male who presents with a chief complaint of ESRD requiring HD, uremia (17 Mar 2025 11:31)      Any change in ROS: seems to be doing  ok :  no sob:   awaiting rehab bed:   now he feels pa in in legs and backache       MEDICATIONS  (STANDING):  albuterol/ipratropium for Nebulization 3 milliLiter(s) Nebulizer every 6 hours  aspirin  chewable 81 milliGRAM(s) Oral daily  atorvastatin 40 milliGRAM(s) Oral at bedtime  chlorhexidine 2% Cloths 1 Application(s) Topical daily  clopidogrel Tablet 75 milliGRAM(s) Oral daily  epoetin aleah-epbx (RETACRIT) Injectable 54249 Unit(s) IV Push <User Schedule>  guaiFENesin ER 1200 milliGRAM(s) Oral every 12 hours  hydrOXYzine hydrochloride 25 milliGRAM(s) Oral three times a day  ibuprofen  Tablet. 400 milliGRAM(s) Oral every 12 hours  mupirocin 2% Ointment 1 Application(s) Topical <User Schedule>  oxyCODONE  ER Tablet 30 milliGRAM(s) Oral every 12 hours  pantoprazole    Tablet 40 milliGRAM(s) Oral before breakfast  polyethylene glycol 3350 17 Gram(s) Oral daily  senna 2 Tablet(s) Oral at bedtime    MEDICATIONS  (PRN):  acetaminophen     Tablet .. 650 milliGRAM(s) Oral every 6 hours PRN Temp greater or equal to 38C (100.4F), Mild Pain (1 - 3)  benzocaine/menthol Lozenge 1 Lozenge Oral three times a day PRN Sore Throat  bisacodyl 5 milliGRAM(s) Oral every 12 hours PRN Constipation  oxyCODONE    IR 10 milliGRAM(s) Oral every 4 hours PRN Moderate Pain (4 - 6)  sodium chloride 0.9% lock flush 10 milliLiter(s) IV Push every 1 hour PRN Pre/post blood products, medications, blood draw, and to maintain line patency    Vital Signs Last 24 Hrs  T(C): 36.4 (17 Mar 2025 11:52), Max: 36.9 (16 Mar 2025 16:00)  T(F): 97.5 (17 Mar 2025 11:52), Max: 98.4 (16 Mar 2025 16:00)  HR: 92 (17 Mar 2025 11:52) (80 - 92)  BP: 134/67 (17 Mar 2025 11:52) (134/67 - 148/79)  BP(mean): --  RR: 18 (17 Mar 2025 11:52) (17 - 18)  SpO2: 97% (17 Mar 2025 11:52) (97% - 98%)    Parameters below as of 17 Mar 2025 11:52  Patient On (Oxygen Delivery Method): room air        I&O's Summary    16 Mar 2025 07:01  -  17 Mar 2025 07:00  --------------------------------------------------------  IN: 0 mL / OUT: 800 mL / NET: -800 mL    17 Mar 2025 07:01  -  17 Mar 2025 12:47  --------------------------------------------------------  IN: 0 mL / OUT: 700 mL / NET: -700 mL          Physical Exam:   GENERAL: NAD, well-groomed, well-developed  HEENT: VINNY/   Atraumatic, Normocephalic  ENMT: No tonsillar erythema, exudates, or enlargement; Moist mucous membranes, Good dentition, No lesions  NECK: Supple, No JVD, Normal thyroid  CHEST/LUNG: Clear to auscultaion  CVS: Regular rate and rhythm; No murmurs, rubs, or gallops  GI: : Soft, Nontender, Nondistended; Bowel sounds present  NERVOUS SYSTEM:  Alert & Oriented X3  EXTREMITIES:  discolored toes   LYMPH: No lymphadenopathy noted  SKIN: No rashes or lesions  ENDOCRINOLOGY: No Thyromegaly  PSYCH: Appropriate    Labs:                              9.2    10.14 )-----------( 203      ( 16 Mar 2025 11:40 )             31.9                         9.0    11.98 )-----------( 216      ( 15 Mar 2025 10:30 )             31.2                         9.9    13.26 )-----------( 254      ( 14 Mar 2025 10:04 )             33.8     03-16    135  |  98  |  28[H]  ----------------------------<  148[H]  4.2   |  23  |  3.43[H]  03-15    134[L]  |  99  |  23  ----------------------------<  161[H]  5.3   |  22  |  2.84[H]    Ca    7.6[L]      16 Mar 2025 11:40  Ca    7.4[L]      15 Mar 2025 19:01  Phos  2.8     03-16  Mg     2.1     03-16      CAPILLARY BLOOD GLUCOSE      rad< from: Xray Chest 1 View- PORTABLE-Urgent (Xray Chest 1 View- PORTABLE-Urgent .) (03.01.25 @ 22:48) >      INTERPRETATION:  EXAMINATION: XR CHEST URGENT    CLINICAL INDICATION: cough    TECHNIQUE: Single frontal, portable view of the chest was obtained.    COMPARISON: Chest x-ray 2/20/2025.    FINDINGS:  Right-sided tunneled hemodialysis catheter terminates in the SVC.  The heart is not accurately assessed in this AP projection.  The lungs are clear.  There is no pneumothorax or pleural effusion.  No acute bony abnormality.    IMPRESSION:  Clear lungs.    --- End of Report ---          GILBERT LEON MD; Resident Radiologist  This document has been electronically signed.   JESUS HALEY DO; Attending Interventional Radiologist  This document has been electronically signed. Mar  2 2025  9:37AM    < end of copied text >          Urinalysis Basic - ( 16 Mar 2025 11:40 )    Color: x / Appearance: x / SG: x / pH: x  Gluc: 148 mg/dL / Ketone: x  / Bili: x / Urobili: x   Blood: x / Protein: x / Nitrite: x   Leuk Esterase: x / RBC: x / WBC x   Sq Epi: x / Non Sq Epi: x / Bacteria: x            RECENT CULTURES:  03-15 @ 07:08 Catheterized Catheterized                <10,000 CFU/mL Normal Urogenital Catie          RESPIRATORY CULTURES:          Studies  Chest X-RAY  CT SCAN Chest   Venous Dopplers: LE:   CT Abdomen  Others

## 2025-03-17 NOTE — PROGRESS NOTE ADULT - ASSESSMENT
74 year old male with CKD, sp polio, paraplegia with associated neurogenic bladder s/p suprapubic catheter who was admitted s/p fall on 2/16.   CKD - ESRD, now s/p DEYA [place] 2/17, on HD  ruled out cellulitis around suprapubic cath  2/20 s/p RRT for tremors and confusion -- pt with chronic tremors although notably worse  hyponatremia    mrsa negative   2/20 CXR with clear lungs   SPC site with chronic/stable inflammatory changes, no drainage - no sign of SSTI  CTA abd with occlusion of b/l external iliac arteries and b/l superficial femoral arteries with distal reconstitution at popliteal arteries; patent three vessel runoff of RLE with 2 vessel runoff of LLE with occlusion of L mid anterior tibial artery with distal reconstitution  Bcx negative to date   mental status at baseline  Vascular following for worsening PAD with worsening ischemic changes   -3/3 s/p RLE angiogram b/l iliac artery stents   WBC improved, remains afebrile     3/16 no tenderness at suprapubic catheter site  no visible erythema at catheter site on exam  UA w/ many epithelial cells, urine culture negative  s/p zosyn     Recommendations:   Continue off antibiotics   HD per renal    Monitor temps/WBC  Continue rest of care per primary team       Bridgette Ramirez M.D.  [facility]  Available on [employer] TEAMS - *PREFERRED*  [phone]  After 5pm on weekdays and all day on weekends - please call [phone]     Thank you for consulting us and involving us in the management of this patients case. In addition to reviewing history, imaging, documents, labs, microbiology, took into account antibiotic stewardship, local antibiogram and infection control strategies and potential transmission issues.

## 2025-03-17 NOTE — PROGRESS NOTE ADULT - ASSESSMENT

## 2025-03-17 NOTE — PROGRESS NOTE ADULT - PROBLEM SELECTOR PLAN 1
pulm wise he remains ok ; on room air : ct chest showed: Mucoid impaction and airway associated subpleural groundglass opacities, most prominent in left lower lobe and to a lesser degree in right lower lobe. Mild bronchial wall thickening, likely inflammatory.  clinically he seems to be doing ok : no resp symptoms:  he would need repeat ct scan chest  in 6 weeks time  3/15: no new change  ; on room air;  alert and awake  3/16: no overnight resp events:   he is on room air:  lings are clear:  doing well ;pulm wise:  3/17: has some bodyache today  ;   pulm wise stable:

## 2025-03-17 NOTE — PROGRESS NOTE ADULT - SUBJECTIVE AND OBJECTIVE BOX
Subjective: Patient seen and examined. No new events except as noted.   pt seen at HD    REVIEW OF SYSTEMS:    CONSTITUTIONAL: No weakness, fevers or chills  EYES/ENT: No visual changes;  No vertigo or throat pain   NECK: No pain or stiffness  RESPIRATORY: No cough, wheezing, hemoptysis; No shortness of breath  CARDIOVASCULAR: No chest pain or palpitations  GASTROINTESTINAL: No abdominal or epigastric pain. No nausea, vomiting, or hematemesis; No diarrhea or constipation. No melena or hematochezia.  GENITOURINARY: No dysuria, frequency or hematuria  NEUROLOGICAL: No numbness or weakness  SKIN: No itching, burning, rashes, or lesions   All other review of systems is negative unless indicated above.    MEDICATIONS:  MEDICATIONS  (STANDING):  albuterol/ipratropium for Nebulization 3 milliLiter(s) Nebulizer every 6 hours  aspirin  chewable 81 milliGRAM(s) Oral daily  atorvastatin 40 milliGRAM(s) Oral at bedtime  chlorhexidine 2% Cloths 1 Application(s) Topical daily  clopidogrel Tablet 75 milliGRAM(s) Oral daily  epoetin aleah-epbx (RETACRIT) Injectable 61842 Unit(s) IV Push <User Schedule>  guaiFENesin ER 1200 milliGRAM(s) Oral every 12 hours  hydrOXYzine hydrochloride 25 milliGRAM(s) Oral three times a day  ibuprofen  Tablet. 400 milliGRAM(s) Oral every 12 hours  mupirocin 2% Ointment 1 Application(s) Topical <User Schedule>  oxyCODONE  ER Tablet 30 milliGRAM(s) Oral every 12 hours  pantoprazole    Tablet 40 milliGRAM(s) Oral before breakfast  polyethylene glycol 3350 17 Gram(s) Oral daily  senna 2 Tablet(s) Oral at bedtime      PHYSICAL EXAM:  T(C): 36.2 (03-17-25 @ 10:52), Max: 36.9 (03-16-25 @ 16:00)  HR: 82 (03-17-25 @ 10:52) (80 - 86)  BP: 134/74 (03-17-25 @ 10:52) (134/74 - 148/79)  RR: 18 (03-17-25 @ 10:52) (17 - 18)  SpO2: 97% (03-17-25 @ 10:52) (97% - 98%)  Wt(kg): --  I&O's Summary    16 Mar 2025 07:01  -  17 Mar 2025 07:00  --------------------------------------------------------  IN: 0 mL / OUT: 800 mL / NET: -800 mL    17 Mar 2025 07:01  -  17 Mar 2025 11:31  --------------------------------------------------------  IN: 0 mL / OUT: 700 mL / NET: -700 mL        Appearance: NAD  HEENT:  Dry oral mucosa, PERRL, EOMI	  Lymphatic: No lymphadenopathy  Cardiovascular: Normal S1 S2, No JVD, No murmurs, No edema  Respiratory: Lungs clear to auscultation	  Psychiatry: A & O x 3, Mood & affect appropriate  Skin: No rashes, No ecchymoses, No cyanosis	  Neurologic: Non-focal  GI/Abd: Soft, obese, nontender. Dressing to [facility] C/D/I. Suprapubic catheter in place  Ext: Palp femoral pulses bilat. BLE atrophic, minimal dorsi/plantarflexion bilaterally. Sensation grossly intact. LLE edematous compared to R, erythema to right toes/forefoot. mottling of right toes/forefoot/heel  LLE:  small superficial abrasion, +edema and +ecchymosis over L knee  +TTP over L knee, no TTP along remainder of extremity; compartments soft  Limited ROM at knee 2/2 pain  No gross varus/valgus laxity, but assessment limited 2/2 pain  Motor: TA/EHL/GS/FHL intact  Sensory: DP/SP/Tib/Jordan/Saph SILT  +DP pulse (symmetric relative to contralateral side), WWP   pressure wound from the LLE immobilizer posteriorly proximal to the knee.  Vascular: Peripheral pulses palpable 2+ bilaterally          LABS:    CARDIAC MARKERS:                                9.2    10.14 )-----------( 203      ( 16 Mar 2025 11:40 )             31.9     03-16    135  |  98  |  28[H]  ----------------------------<  148[H]  4.2   |  23  |  3.43[H]    Ca    7.6[L]      16 Mar 2025 11:40  Phos  2.8     03-16  Mg     2.1     03-16      proBNP:   Lipid Profile:   HgA1c:   TSH:     Negative          TELEMETRY: 	    ECG:  	  RADIOLOGY:   DIAGNOSTIC TESTING:  [ ] Echocardiogram:  [ ]  Catheterization:  [ ] Stress Test:    OTHER:

## 2025-03-17 NOTE — PROGRESS NOTE ADULT - SUBJECTIVE AND OBJECTIVE BOX
[facility]  SABINO Griffin Y. Patel, S. Shah, G. Casimir  [phone]  ([phone] - weekdays after 5pm and weekends)    Name: BRIAN DEMPSEY  Age/Gender: 74y Male  MRN: [id number]    Interval History:  Patient seen and examined this morning at HD  No new complaints noted   Notes reviewed  No concerning overnight events  Afebrile   Allergies: No Known Allergies      Objective:  Vitals:   T(F): 97.1 (03-17-25 @ 10:52), Max: 98.4 (03-16-25 @ 16:00)  HR: 82 (03-17-25 @ 10:52) (80 - 86)  BP: 134/74 (03-17-25 @ 10:52) (134/74 - 148/79)  RR: 18 (03-17-25 @ 10:52) (17 - 18)  SpO2: 97% (03-17-25 @ 10:52) (97% - 98%)  Physical Examination:  General: no acute distress, on HD  HEENT: normocephalic, atraumatic  Respiratory: breathing comfortably  Cardiovascular: S1 and S2 present  Gastrointestinal: nondistended  Extremities: ischemic changes LE   Skin: no visible rash    Laboratory Studies:  CBC:                       9.2    10.14 )-----------( 203      ( 16 Mar 2025 11:40 )             31.9     WBC Trend:  10.14 03-16-25 @ 11:40  11.98 03-15-25 @ 10:30  13.26 03-14-25 @ 10:04  12.93 03-13-25 @ 09:58  15.12 03-12-25 @ 08:49  12.90 03-11-25 @ 09:50    CMP: 03-16    135  |  98  |  28[H]  ----------------------------<  148[H]  4.2   |  23  |  3.43[H]    Ca    7.6[L]      16 Mar 2025 11:40  Phos  2.8     03-16  Mg     2.1     03-16    Creatinine: 3.43 mg/dL (03-16-25 @ 11:40)  Creatinine: 2.84 mg/dL (03-15-25 @ 19:01)  Creatinine: 2.78 mg/dL (03-11-25 @ 09:50)    Microbiology: reviewed   Culture - Urine (collected 03-15-25 @ 07:08)  Source: Catheterized Catheterized  Final Report (03-16-25 @ 08:38):    <10,000 CFU/mL Normal Urogenital Catie    Radiology: reviewed     Medications:  acetaminophen     Tablet .. 650 milliGRAM(s) Oral every 6 hours PRN  albuterol/ipratropium for Nebulization 3 milliLiter(s) Nebulizer every 6 hours  aspirin  chewable 81 milliGRAM(s) Oral daily  atorvastatin 40 milliGRAM(s) Oral at bedtime  benzocaine/menthol Lozenge 1 Lozenge Oral three times a day PRN  bisacodyl 5 milliGRAM(s) Oral every 12 hours PRN  chlorhexidine 2% Cloths 1 Application(s) Topical daily  clopidogrel Tablet 75 milliGRAM(s) Oral daily  epoetin aleah-epbx (RETACRIT) Injectable 69487 Unit(s) IV Push <User Schedule>  guaiFENesin ER 1200 milliGRAM(s) Oral every 12 hours  hydrOXYzine hydrochloride 25 milliGRAM(s) Oral three times a day  ibuprofen  Tablet. 400 milliGRAM(s) Oral every 12 hours  mupirocin 2% Ointment 1 Application(s) Topical <User Schedule>  oxyCODONE    IR 10 milliGRAM(s) Oral every 4 hours PRN  oxyCODONE  ER Tablet 30 milliGRAM(s) Oral every 12 hours  pantoprazole    Tablet 40 milliGRAM(s) Oral before breakfast  polyethylene glycol 3350 17 Gram(s) Oral daily  senna 2 Tablet(s) Oral at bedtime  sodium chloride 0.9% lock flush 10 milliLiter(s) IV Push every 1 hour PRN    Prior/Completed Antimicrobials:  piperacillin/tazobactam IVPB.  piperacillin/tazobactam IVPB.  piperacillin/tazobactam IVPB.-  piperacillin/tazobactam IVPB.-  vancomycin  IVPB  vancomycin  IVPB

## 2025-03-17 NOTE — PROGRESS NOTE ADULT - PROBLEM SELECTOR PLAN 3
-Neuro following  -ABG 2/20 acceptable  -Pt lethargic 2/22, ABG never sent but AMS appears to be improving   -ABG 2/28 with no co2 retention.  3/9: he seems pretty alert and awake and responsive appropriately to questions  /: resolved:   he is alert sand awke and responsive to questions well : needs pt ot  3/11 : seems to be doing  ok : no sob:  on room air  3/12: resolved  3/14: he is totally alert and awake and responsive to questions  3/15 resolved  3/16: as above  3/17: he is pretty alert and awake:  no co2 retention in last abg

## 2025-03-17 NOTE — PROGRESS NOTE ADULT - SUBJECTIVE AND OBJECTIVE BOX
Patient is a 74y old  Male who presents with a chief complaint of ESRD requiring HD, uremia (17 Mar 2025 12:47)      SUBJECTIVE / OVERNIGHT EVENTS: ptn states he is lethargic, he has LLE numbness which is new and severe pain at SPC insertion site. this was ID, d/w neuro, renal and vascular. as per ID: no sign of an acute infection recommend CT A/P.     MEDICATIONS  (STANDING):  albuterol/ipratropium for Nebulization 3 milliLiter(s) Nebulizer every 6 hours  aspirin  chewable 81 milliGRAM(s) Oral daily  atorvastatin 40 milliGRAM(s) Oral at bedtime  chlorhexidine 2% Cloths 1 Application(s) Topical daily  clopidogrel Tablet 75 milliGRAM(s) Oral daily  epoetin aleah-epbx (RETACRIT) Injectable 72728 Unit(s) IV Push <User Schedule>  guaiFENesin ER 1200 milliGRAM(s) Oral every 12 hours  hydrOXYzine hydrochloride 25 milliGRAM(s) Oral three times a day  ibuprofen  Tablet. 400 milliGRAM(s) Oral every 12 hours  mupirocin 2% Ointment 1 Application(s) Topical <User Schedule>  oxyCODONE  ER Tablet 30 milliGRAM(s) Oral every 12 hours  pantoprazole    Tablet 40 milliGRAM(s) Oral before breakfast  polyethylene glycol 3350 17 Gram(s) Oral daily  senna 2 Tablet(s) Oral at bedtime    MEDICATIONS  (PRN):  acetaminophen     Tablet .. 650 milliGRAM(s) Oral every 6 hours PRN Temp greater or equal to 38C (100.4F), Mild Pain (1 - 3)  benzocaine/menthol Lozenge 1 Lozenge Oral three times a day PRN Sore Throat  bisacodyl 5 milliGRAM(s) Oral every 12 hours PRN Constipation  oxyCODONE    IR 10 milliGRAM(s) Oral every 4 hours PRN Moderate Pain (4 - 6)  sodium chloride 0.9% lock flush 10 milliLiter(s) IV Push every 1 hour PRN Pre/post blood products, medications, blood draw, and to maintain line patency      Vital Signs Last 24 Hrs  T(F): 98 (03-17-25 @ 16:26), Max: 98.1 (03-17-25 @ 06:46)  HR: 69 (03-17-25 @ 16:26) (69 - 92)  BP: 128/73 (03-17-25 @ 16:26) (128/73 - 148/79)  RR: 18 (03-17-25 @ 16:26) (17 - 18)  SpO2: 96% (03-17-25 @ 16:26) (96% - 97%)  Telemetry:   CAPILLARY BLOOD GLUCOSE        I&O's Summary    16 Mar 2025 07:01  -  17 Mar 2025 07:00  --------------------------------------------------------  IN: 0 mL / OUT: 800 mL / NET: -800 mL    17 Mar 2025 07:01  -  17 Mar 2025 17:58  --------------------------------------------------------  IN: 0 mL / OUT: 700 mL / NET: -700 mL        PHYSICAL EXAM:  GENERAL: NAD, well-developed  HEAD:  Atraumatic, Normocephalic  EYES: EOMI, PERRLA, conjunctiva and sclera clear  NECK: Supple, No JVD  CHEST/LUNG: Clear to auscultation bilaterally; No wheeze  HEART: Regular rate and rhythm; No murmurs, rubs, or gallops  ABDOMEN: Soft, Nontender, Nondistended; Bowel sounds present  EXTREMITIES:  2+ Peripheral Pulses, No clubbing, cyanosis, or edema  PSYCH: AAOx3  NEUROLOGY: non-focal  SKIN: No rashes or lesions    LABS:                        9.2    10.14 )-----------( 203      ( 16 Mar 2025 11:40 )             31.9     03-16    135  |  98  |  28[H]  ----------------------------<  148[H]  4.2   |  23  |  3.43[H]    Ca    7.6[L]      16 Mar 2025 11:40  Phos  2.8     03-16  Mg     2.1     03-16            Urinalysis Basic - ( 16 Mar 2025 11:40 )    Color: x / Appearance: x / SG: x / pH: x  Gluc: 148 mg/dL / Ketone: x  / Bili: x / Urobili: x   Blood: x / Protein: x / Nitrite: x   Leuk Esterase: x / RBC: x / WBC x   Sq Epi: x / Non Sq Epi: x / Bacteria: x        RADIOLOGY & ADDITIONAL TESTS:    Imaging Personally Reviewed:    Consultant(s) Notes Reviewed:      Care Discussed with Consultants/Other Providers:

## 2025-03-17 NOTE — PROGRESS NOTE ADULT - ASSESSMENT
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 The initial plan was that he would present to the [facility] ER Monday 2/17 for HD initiation. However, day of admission, he fell and sustained trauma to his knee. Called to consult for cough, elevated R hemidiaphragm.

## 2025-03-17 NOTE — PROGRESS NOTE ADULT - SUBJECTIVE AND OBJECTIVE BOX
Neurology      S; patient seen. no neuro changes ; wife sleeping at bedside ; patient HD today          Medications: MEDICATIONS  (STANDING):  albuterol/ipratropium for Nebulization 3 milliLiter(s) Nebulizer every 6 hours  aspirin  chewable 81 milliGRAM(s) Oral daily  atorvastatin 40 milliGRAM(s) Oral at bedtime  chlorhexidine 2% Cloths 1 Application(s) Topical daily  clopidogrel Tablet 75 milliGRAM(s) Oral daily  epoetin aleah-epbx (RETACRIT) Injectable 63129 Unit(s) IV Push <User Schedule>  guaiFENesin ER 1200 milliGRAM(s) Oral every 12 hours  hydrOXYzine hydrochloride 25 milliGRAM(s) Oral three times a day  ibuprofen  Tablet. 400 milliGRAM(s) Oral every 12 hours  mupirocin 2% Ointment 1 Application(s) Topical <User Schedule>  oxyCODONE  ER Tablet 30 milliGRAM(s) Oral every 12 hours  pantoprazole    Tablet 40 milliGRAM(s) Oral before breakfast  polyethylene glycol 3350 17 Gram(s) Oral daily  senna 2 Tablet(s) Oral at bedtime    MEDICATIONS  (PRN):  acetaminophen     Tablet .. 650 milliGRAM(s) Oral every 6 hours PRN Temp greater or equal to 38C (100.4F), Mild Pain (1 - 3)  benzocaine/menthol Lozenge 1 Lozenge Oral three times a day PRN Sore Throat  bisacodyl 5 milliGRAM(s) Oral every 12 hours PRN Constipation  oxyCODONE    IR 10 milliGRAM(s) Oral every 4 hours PRN Moderate Pain (4 - 6)  sodium chloride 0.9% lock flush 10 milliLiter(s) IV Push every 1 hour PRN Pre/post blood products, medications, blood draw, and to maintain line patency       Vitals:  Vital Signs Last 24 Hrs  T(C): 36.2 (17 Mar 2025 10:52), Max: 36.9 (16 Mar 2025 16:00)  T(F): 97.1 (17 Mar 2025 10:52), Max: 98.4 (16 Mar 2025 16:00)  HR: 82 (17 Mar 2025 10:52) (80 - 86)  BP: 134/74 (17 Mar 2025 10:52) (134/74 - 148/79)  BP(mean): --  RR: 18 (17 Mar 2025 10:52) (17 - 18)  SpO2: 97% (17 Mar 2025 10:52) (97% - 98%)    Parameters below as of 17 Mar 2025 10:52  Patient On (Oxygen Delivery Method): room air                     General Appearance: Appropriately dressed and in no acute distress       Head: Normocephalic, atraumatic and no dysmorphic features  Ear, Nose, and Throat: Moist mucous membranes  CVS: S1S2+  Resp: No SOB, no wheeze or rhonchi  GI: soft NT/ND  Extremities: LE PVD : dusky toes noted   Skin: + decub      Neurological Exam:  Mental Status: Awake, alert and oriented x 2.  Able to follow simple and complex verbal commands. Able to name and repeat. fluent speech. No obvious aphasia or dysarthria noted.   Cranial Nerves: PERRL, EOMI, VFFC, sensation V1-V3 intact,  no obvious facial asymmetry, equal elevation of palate, scm/trap 5/5, tongue is midline on protrusion. no obvious papilledema on fundoscopic exam. hearing is grossly intact.   Motor: Normal bulk, tone and strength throughout uppers 4/ lowers 0-/1 5   Sensation: Intact to light touch and pinprick throughout.     Coordination: No dysmetria on FNF   Gait: deferred, wheelchair bound     Data/Labs/Imaging which I personally reviewed.         LABS:                          9.2    10.14 )-----------( 203      ( 16 Mar 2025 11:40 )             31.9     03-16    135  |  98  |  28[H]  ----------------------------<  148[H]  4.2   |  23  |  3.43[H]    Ca    7.6[L]      16 Mar 2025 11:40  Phos  2.8     03-16  Mg     2.1     03-16          Urinalysis Basic - ( 16 Mar 2025 11:40 )    Color: x / Appearance: x / SG: x / pH: x  Gluc: 148 mg/dL / Ketone: x  / Bili: x / Urobili: x   Blood: x / Protein: x / Nitrite: x   Leuk Esterase: x / RBC: x / WBC x   Sq Epi: x / Non Sq Epi: x / Bacteria: x            < from: CT Head No Cont (02.20.25 @ 18:47) >    ACC: [id number] EXAM:  CT BRAIN   ORDERED BY: GUY DE LA CRUZ     PROCEDURE DATE:  02/20/2025          INTERPRETATION:  CLINICAL INDICATION: Altered mental status    TECHNIQUE: Axial CT scanning of the brain was obtained from the skull   base to the vertex without the administration of intravenous contrast.   Reformatted coronal and sagittal images were subsequently obtained and   reviewed.    COMPARISON: None    FINDINGS:  There is no CT evidence of acute transcortical infarct. Age-related   involutional changes and chronic microvascular ischemic changes.    There is no hydrocephalus, mass effect, or acute intracranial hemorrhage.   No extra-axial collection. Basal cisterns are patent.    The visualized paranasal sinuses and mastoid air cells are clear.    The calvarium is intact.    IMPRESSION:  No evidence of acute transcortical infarct, acute intracranial   hemorrhage, or mass effect.    --- End of Report ---            CORTNEY REARDON MD; Attending Radiologist  This document has been electronically signed. Feb 20 2025  7:07PM    < end of copied text >

## 2025-03-17 NOTE — PROGRESS NOTE ADULT - PROBLEM SELECTOR PLAN 6
-Monitor leukocytosis/fever curve  -CXR as above, no clear infiltrate  -ID f/u.  3/9: WBC slowly downtrending  3/10: con tto downtrend  3/11: wbc is slightly high o fver;  3/12: wbc increased further   no fever:  3/13: WBC spontaneously downtrended today  3/14: wbc slightly high ; no fever;  3/15; started on zosyn  3/16: urine culture is negative:  antibiotics dced by ID  3/17: wbc DOWNTRENDED

## 2025-03-17 NOTE — PROGRESS NOTE ADULT - ASSESSMENT
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  came in after fall and for HD.     2/20 CTH neg   \Na 123 --> now 133   A1c 5.7   TSH WNL 3.46   o/e 2/21 AAOx2, uppers 4/5, lowers limited .  2/23; family bedside upset that he has pain in leg after he was moved.  patient going for imaging now.   s/p R IJ permacath by IR 2/25 2/27 AAOx3   sopoke with family bedisde 3/17 wife says mental status okay, sometimes up and down but good     Imrpssion  1) AMS, waxing and waing , likely multifactorial from infection, metabolic/electrolyte derrangements/ delerium / medication effect , ureemia which is imporving   2) polio  3) fall 2/2 weakness  4) hyponatremia to 123 2/20  improved 133 -->136    - HD today;    - AVF outpatient     f/u vascular; no vascluar options ; f/u with Messi from vasculare to schedule outpatient procedure   - on DAPT   - was getting oxycodone ER 30mg BID and oxy PRN IR i10 and dilauded PRN ;   - f/u psych   - limit sedating meds   - continue to monitor and correct metabolic derrangements .  - delerium precuations.   - frequent re orientation  - check b12, RPR, ammonia level   - will consdier MRI brain or EEG if doesn't improve but seems to have been improving   - PT/OT   - check FS, glucose control <180  - GI/DVT ppx  - Thank you for allowing me to participate in the care of this patient. Call with questions.    spoke with wife 3/16  dc planning [facility] needs auth ; Freddie Limon MD  Vascular Neurology  Office: [phone]

## 2025-03-17 NOTE — PROGRESS NOTE ADULT - SUBJECTIVE AND OBJECTIVE BOX
seen on HD      VITAL:  T(C): , Max: 36.9 (03-16-25 @ 16:00)  T(F): , Max: 98.4 (03-16-25 @ 16:00)  HR: 80 (03-17-25 @ 07:45)  BP: 141/70 (03-17-25 @ 07:45)  BP(mean): --  RR: 17 (03-17-25 @ 07:45)  SpO2: 97% (03-17-25 @ 07:45)  Wt(kg): --      PHYSICAL EXAM:  Constitutional: alert, NAD, in good spirits  HEENT: NCAT, DMM  Neck: Supple, No JVD  Respiratory: CTA-b/l  Cardiovascular: RRR s1s2, no m/r/g  Gastrointestinal: BS+, soft, NT/ND  : (+)suprapubic cath  Extremities: No peripheral edema b/l  Neurological: reduced generalized strength  Back: no CVAT b/l  Skin: (+)cyanotic changes b/l feet  Access: [facility] tunneled cath-accessed      LABS:                        9.2    10.14 )-----------( 203      ( 16 Mar 2025 11:40 )             31.9     Na(135)/K(4.2)/Cl(98)/HCO3(23)/BUN(28)/Cr(3.43)Glu(148)/Ca(7.6)/Mg(2.1)/PO4(2.8)    03-16 @ 11:40  Na(134)/K(5.3)/Cl(99)/HCO3(22)/BUN(23)/Cr(2.84)Glu(161)/Ca(7.4)/Mg(--)/PO4(--)    03-15 @ 19:01      IMPRESSION: 74M w/ polio, neurogenic bladder/suprapubic catheter, 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 On HD now    (2)Hypophosphatemia - now off PO4 binders and off dietary PO4 restriction    (3)Anemia - s/p IV iron; on Retacrit with HD    (4)PAD - s/p LE bypass 3/3/25 - cyanotic changes of toes b/l - for further management after discharge    (5) - s/p suprapubic cath change 3/10/25    (6)CV - mildly hypertensive/starting to accumulate fluid - we should attempt some level of UF with HD from this point forward      RECOMMEND:   (1)HD today - 0.7kg UF as able; Retacrit with HD  (2)[facility]               Rafita Denny MD  [facility]  Office/on call physician: [phone]  Cell (7a-7p): [phone]       seen on HD  (+)fatigue/malaise over weekend     VITAL:  T(C): , Max: 36.9 (03-16-25 @ 16:00)  T(F): , Max: 98.4 (03-16-25 @ 16:00)  HR: 80 (03-17-25 @ 07:45)  BP: 141/70 (03-17-25 @ 07:45)  BP(mean): --  RR: 17 (03-17-25 @ 07:45)  SpO2: 97% (03-17-25 @ 07:45)  Wt(kg): --      PHYSICAL EXAM:  Constitutional: alert, NAD, in good spirits  HEENT: NCAT, DMM  Neck: Supple, No JVD  Respiratory: CTA-b/l  Cardiovascular: RRR s1s2, no m/r/g  Gastrointestinal: BS+, soft, NT/ND  : (+)suprapubic cath  Extremities: No peripheral edema b/l  Neurological: reduced generalized strength  Back: no CVAT b/l  Skin: (+)cyanotic changes b/l feet  Access: [facility] tunneled cath-accessed      LABS:                        9.2    10.14 )-----------( 203      ( 16 Mar 2025 11:40 )             31.9     Na(135)/K(4.2)/Cl(98)/HCO3(23)/BUN(28)/Cr(3.43)Glu(148)/Ca(7.6)/Mg(2.1)/PO4(2.8)    03-16 @ 11:40  Na(134)/K(5.3)/Cl(99)/HCO3(22)/BUN(23)/Cr(2.84)Glu(161)/Ca(7.4)/Mg(--)/PO4(--)    03-15 @ 19:01      IMPRESSION: 74M w/ polio, neurogenic bladder/suprapubic catheter, 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 On HD now    (2)Hypophosphatemia - now off PO4 binders and off dietary PO4 restriction    (3)Anemia - s/p IV iron; on Retacrit with HD    (4)PAD - s/p LE bypass 3/3/25 - cyanotic changes of toes b/l - for further management after discharge    (5) - s/p suprapubic cath change 3/10/25    (6)CV - mildly hypertensive/starting to accumulate fluid - we should attempt some level of UF with HD from this point forward      RECOMMEND:   (1)HD today - 0.7kg UF as able; Retacrit with HD  (2)[facility]               Rafita Denny MD  [facility]  Office/on call physician: [phone]  Cell (7a-7p): [phone]

## 2025-03-18 NOTE — PROGRESS NOTE ADULT - SUBJECTIVE AND OBJECTIVE BOX
Neurology      S; patient seen. no neuro changes       Medications: MEDICATIONS  (STANDING):  albuterol/ipratropium for Nebulization 3 milliLiter(s) Nebulizer every 6 hours  aspirin  chewable 81 milliGRAM(s) Oral daily  atorvastatin 40 milliGRAM(s) Oral at bedtime  chlorhexidine 2% Cloths 1 Application(s) Topical daily  clopidogrel Tablet 75 milliGRAM(s) Oral daily  epoetin aleah-epbx (RETACRIT) Injectable 55268 Unit(s) IV Push <User Schedule>  guaiFENesin ER 1200 milliGRAM(s) Oral every 12 hours  hydrOXYzine hydrochloride 25 milliGRAM(s) Oral three times a day  ibuprofen  Tablet. 400 milliGRAM(s) Oral every 12 hours  mupirocin 2% Ointment 1 Application(s) Topical <User Schedule>  oxyCODONE  ER Tablet 30 milliGRAM(s) Oral every 12 hours  pantoprazole    Tablet 40 milliGRAM(s) Oral before breakfast  polyethylene glycol 3350 17 Gram(s) Oral daily  senna 2 Tablet(s) Oral at bedtime    MEDICATIONS  (PRN):  acetaminophen     Tablet .. 650 milliGRAM(s) Oral every 6 hours PRN Temp greater or equal to 38C (100.4F), Mild Pain (1 - 3)  benzocaine/menthol Lozenge 1 Lozenge Oral three times a day PRN Sore Throat  bisacodyl 5 milliGRAM(s) Oral every 12 hours PRN Constipation  oxyCODONE    IR 10 milliGRAM(s) Oral every 4 hours PRN Moderate Pain (4 - 6)  sodium chloride 0.9% lock flush 10 milliLiter(s) IV Push every 1 hour PRN Pre/post blood products, medications, blood draw, and to maintain line patency       Vitals:  Vital Signs Last 24 Hrs  T(C): 36.8 (18 Mar 2025 10:20), Max: 36.8 (18 Mar 2025 10:20)  T(F): 98.3 (18 Mar 2025 10:20), Max: 98.3 (18 Mar 2025 10:20)  HR: 89 (18 Mar 2025 10:20) (69 - 92)  BP: 123/63 (18 Mar 2025 10:20) (123/63 - 174/78)  BP(mean): --  RR: 18 (18 Mar 2025 10:20) (18 - 18)  SpO2: 97% (18 Mar 2025 10:20) (96% - 97%)    Parameters below as of 18 Mar 2025 10:20  Patient On (Oxygen Delivery Method): room air                  General Appearance: Appropriately dressed and in no acute distress       Head: Normocephalic, atraumatic and no dysmorphic features  Ear, Nose, and Throat: Moist mucous membranes  CVS: S1S2+  Resp: No SOB, no wheeze or rhonchi  GI: soft NT/ND  Extremities: LE PVD : dusky toes noted   Skin: + decub      Neurological Exam:  Mental Status: Awake, alert and oriented x 2.  Able to follow simple and complex verbal commands. Able to name and repeat. fluent speech. No obvious aphasia or dysarthria noted.   Cranial Nerves: PERRL, EOMI, VFFC, sensation V1-V3 intact,  no obvious facial asymmetry, equal elevation of palate, scm/trap 5/5, tongue is midline on protrusion. no obvious papilledema on fundoscopic exam. hearing is grossly intact.   Motor: Normal bulk, tone and strength throughout uppers 4/ lowers 0-/1 5   Sensation: Intact to light touch and pinprick throughout.     Coordination: No dysmetria on FNF   Gait: deferred, wheelchair bound     Data/Labs/Imaging which I personally reviewed.        LABS:                          9.2    10.14 )-----------( 203      ( 16 Mar 2025 11:40 )             31.9     03-16    135  |  98  |  28[H]  ----------------------------<  148[H]  4.2   |  23  |  3.43[H]    Ca    7.6[L]      16 Mar 2025 11:40  Phos  2.8     03-16  Mg     2.1     03-16          Urinalysis Basic - ( 16 Mar 2025 11:40 )    Color: x / Appearance: x / SG: x / pH: x  Gluc: 148 mg/dL / Ketone: x  / Bili: x / Urobili: x   Blood: x / Protein: x / Nitrite: x   Leuk Esterase: x / RBC: x / WBC x   Sq Epi: x / Non Sq Epi: x / Bacteria: x            < from: CT Head No Cont (02.20.25 @ 18:47) >    ACC: [id number] EXAM:  CT BRAIN   ORDERED BY: GUY JONO     PROCEDURE DATE:  02/20/2025          INTERPRETATION:  CLINICAL INDICATION: Altered mental status    TECHNIQUE: Axial CT scanning of the brain was obtained from the skull   base to the vertex without the administration of intravenous contrast.   Reformatted coronal and sagittal images were subsequently obtained and   reviewed.    COMPARISON: None    FINDINGS:  There is no CT evidence of acute transcortical infarct. Age-related   involutional changes and chronic microvascular ischemic changes.    There is no hydrocephalus, mass effect, or acute intracranial hemorrhage.   No extra-axial collection. Basal cisterns are patent.    The visualized paranasal sinuses and mastoid air cells are clear.    The calvarium is intact.    IMPRESSION:  No evidence of acute transcortical infarct, acute intracranial   hemorrhage, or mass effect.    --- End of Report ---            CORTNEY REARDON MD; Attending Radiologist  This document has been electronically signed. Feb 20 2025  7:07PM    < end of copied text >

## 2025-03-18 NOTE — PROGRESS NOTE ADULT - SUBJECTIVE AND OBJECTIVE BOX
[facility]  SABINO Griffin Y. Patel, S. Shah, G. Casimir  [phone]  ([phone] - weekdays after 5pm and weekends)    Name: BRIAN DEMPSEY  Age/Gender: 74y Male  MRN: [id number]    Interval History:  Patient seen and examined this morning.   States feels abd pain is "inside"  Does report pain less after SPC repositioned   No new complaints noted.  Notes reviewed  No concerning overnight events  Afebrile   Allergies: No Known Allergies      Objective:  Vitals:   T(F): 97.8 (03-18-25 @ 00:39), Max: 98 (03-17-25 @ 16:26)  HR: 87 (03-18-25 @ 00:39) (69 - 92)  BP: 174/78 (03-18-25 @ 00:39) (128/73 - 174/78)  RR: 18 (03-18-25 @ 00:39) (18 - 18)  SpO2: 97% (03-18-25 @ 00:39) (96% - 97%)  Physical Examination:  General: no acute distress  HEENT: normocephalic, atraumatic  Respiratory: breathing comfortably  Cardiovascular: S1 and S2 present  Gastrointestinal: NT, ND, SPC site with no erythema  Extremities: ischemic changes LE   Skin: no visible rash    Laboratory Studies:  CBC:                       9.2    10.14 )-----------( 203      ( 16 Mar 2025 11:40 )             31.9     WBC Trend:  10.14 03-16-25 @ 11:40  11.98 03-15-25 @ 10:30  13.26 03-14-25 @ 10:04  12.93 03-13-25 @ 09:58  15.12 03-12-25 @ 08:49  12.90 03-11-25 @ 09:50    CMP: 03-16    135  |  98  |  28[H]  ----------------------------<  148[H]  4.2   |  23  |  3.43[H]    Ca    7.6[L]      16 Mar 2025 11:40  Phos  2.8     03-16  Mg     2.1     03-16    Creatinine: 3.43 mg/dL (03-16-25 @ 11:40)  Creatinine: 2.84 mg/dL (03-15-25 @ 19:01)  Creatinine: 2.78 mg/dL (03-11-25 @ 09:50)    Microbiology: reviewed   Culture - Urine (collected 03-15-25 @ 07:08)  Source: Catheterized Catheterized  Final Report (03-16-25 @ 08:38):    <10,000 CFU/mL Normal Urogenital Catie    Radiology: reviewed     Medications:  acetaminophen     Tablet .. 650 milliGRAM(s) Oral every 6 hours PRN  albuterol/ipratropium for Nebulization 3 milliLiter(s) Nebulizer every 6 hours  aspirin  chewable 81 milliGRAM(s) Oral daily  atorvastatin 40 milliGRAM(s) Oral at bedtime  benzocaine/menthol Lozenge 1 Lozenge Oral three times a day PRN  bisacodyl 5 milliGRAM(s) Oral every 12 hours PRN  chlorhexidine 2% Cloths 1 Application(s) Topical daily  clopidogrel Tablet 75 milliGRAM(s) Oral daily  epoetin aleah-epbx (RETACRIT) Injectable 27933 Unit(s) IV Push <User Schedule>  guaiFENesin ER 1200 milliGRAM(s) Oral every 12 hours  hydrOXYzine hydrochloride 25 milliGRAM(s) Oral three times a day  ibuprofen  Tablet. 400 milliGRAM(s) Oral every 12 hours  mupirocin 2% Ointment 1 Application(s) Topical <User Schedule>  oxyCODONE    IR 10 milliGRAM(s) Oral every 4 hours PRN  oxyCODONE  ER Tablet 30 milliGRAM(s) Oral every 12 hours  pantoprazole    Tablet 40 milliGRAM(s) Oral before breakfast  polyethylene glycol 3350 17 Gram(s) Oral daily  senna 2 Tablet(s) Oral at bedtime  sodium chloride 0.9% lock flush 10 milliLiter(s) IV Push every 1 hour PRN    Prior/Completed Antimicrobials:  piperacillin/tazobactam IVPB.  piperacillin/tazobactam IVPB.  piperacillin/tazobactam IVPB.-  piperacillin/tazobactam IVPB.-  vancomycin  IVPB  vancomycin  IVPB

## 2025-03-18 NOTE — PROGRESS NOTE ADULT - ASSESSMENT
74 year old male with CKD, sp polio, paraplegia with associated neurogenic bladder s/p suprapubic catheter who was admitted s/p fall on 2/16.   CKD - ESRD, now s/p DEYA [place] 2/17, on HD  ruled out cellulitis around suprapubic cath  2/20 s/p RRT for tremors and confusion -- pt with chronic tremors although notably worse  hyponatremia    mrsa negative   2/20 CXR with clear lungs   SPC site with chronic/stable inflammatory changes, no drainage - no sign of SSTI  CTA abd with occlusion of b/l external iliac arteries and b/l superficial femoral arteries with distal reconstitution at popliteal arteries; patent three vessel runoff of RLE with 2 vessel runoff of LLE with occlusion of L mid anterior tibial artery with distal reconstitution  Bcx negative to date   mental status at baseline  Vascular following for worsening PAD with worsening ischemic changes   -3/3 s/p RLE angiogram b/l iliac artery stents     3/16 no tenderness at suprapubic catheter site  no visible erythema at catheter site on exam  UA w/ many epithelial cells, urine culture negative  s/p zosyn 3/14-3/16  complains of abd pain, SPC site remains without erythema  per pt and wife, pain less after SPC repositioned   remains afebrile    Recommendations:   If pain continues, obtain CTAP   Continue off antibiotics   HD per renal    Monitor temps/WBC  Continue rest of care per primary team       Bridgette Ramirez M.D.  [facility]  Available on [employer] TEAMS - *PREFERRED*  [phone]  After 5pm on weekdays and all day on weekends - please call [phone]     Thank you for consulting us and involving us in the management of this patients case. In addition to reviewing history, imaging, documents, labs, microbiology, took into account antibiotic stewardship, local antibiogram and infection control strategies and potential transmission issues.

## 2025-03-18 NOTE — PROGRESS NOTE ADULT - SUBJECTIVE AND OBJECTIVE BOX
Subjective: Patient seen and examined. No new events except as noted.   Wife at bedside  feels ok    REVIEW OF SYSTEMS:    CONSTITUTIONAL: +weakness, fevers or chills  EYES/ENT: No visual changes;  No vertigo or throat pain   NECK: No pain or stiffness  RESPIRATORY: No cough, wheezing, hemoptysis; No shortness of breath  CARDIOVASCULAR: No chest pain or palpitations  GASTROINTESTINAL: No abdominal or epigastric pain. No nausea, vomiting, or hematemesis; No diarrhea or constipation. No melena or hematochezia.  GENITOURINARY: No dysuria, frequency or hematuria  NEUROLOGICAL: No numbness or weakness  SKIN: No itching, burning, rashes, or lesions   All other review of systems is negative unless indicated above.    MEDICATIONS:  MEDICATIONS  (STANDING):  albuterol/ipratropium for Nebulization 3 milliLiter(s) Nebulizer every 6 hours  aspirin  chewable 81 milliGRAM(s) Oral daily  atorvastatin 40 milliGRAM(s) Oral at bedtime  chlorhexidine 2% Cloths 1 Application(s) Topical daily  clopidogrel Tablet 75 milliGRAM(s) Oral daily  epoetin aleah-epbx (RETACRIT) Injectable 69181 Unit(s) IV Push <User Schedule>  guaiFENesin ER 1200 milliGRAM(s) Oral every 12 hours  hydrOXYzine hydrochloride 25 milliGRAM(s) Oral three times a day  ibuprofen  Tablet. 400 milliGRAM(s) Oral every 12 hours  mupirocin 2% Ointment 1 Application(s) Topical <User Schedule>  oxyCODONE  ER Tablet 30 milliGRAM(s) Oral every 12 hours  pantoprazole    Tablet 40 milliGRAM(s) Oral before breakfast  polyethylene glycol 3350 17 Gram(s) Oral daily  senna 2 Tablet(s) Oral at bedtime      PHYSICAL EXAM:  T(C): 36.8 (03-18-25 @ 10:20), Max: 36.8 (03-18-25 @ 10:20)  HR: 89 (03-18-25 @ 10:20) (69 - 92)  BP: 123/63 (03-18-25 @ 10:20) (123/63 - 174/78)  RR: 18 (03-18-25 @ 10:20) (18 - 18)  SpO2: 97% (03-18-25 @ 10:20) (96% - 97%)  Wt(kg): --  I&O's Summary    17 Mar 2025 07:01  -  18 Mar 2025 07:00  --------------------------------------------------------  IN: 0 mL / OUT: 700 mL / NET: -700 mL          Appearance: NAD  HEENT:  Dry oral mucosa, PERRL, EOMI	  Lymphatic: No lymphadenopathy  Cardiovascular: Normal S1 S2, No JVD, No murmurs, No edema  Respiratory: Lungs clear to auscultation	  Psychiatry: A & O x 3, Mood & affect appropriate  Skin: No rashes, No ecchymoses, No cyanosis	  Neurologic: Non-focal  GI/Abd: Soft, obese, nontender. Dressing to [facility] C/D/I. Suprapubic catheter in place  Ext: Palp femoral pulses bilat. BLE atrophic, minimal dorsi/plantarflexion bilaterally. Sensation grossly intact. LLE edematous compared to R, erythema to right toes/forefoot. mottling of right toes/forefoot/heel  LLE:  small superficial abrasion, +edema and +ecchymosis over L knee  +TTP over L knee, no TTP along remainder of extremity; compartments soft  Limited ROM at knee 2/2 pain  No gross varus/valgus laxity, but assessment limited 2/2 pain  Motor: TA/EHL/GS/FHL intact  Sensory: DP/SP/Tib/Jordan/Saph SILT  +DP pulse (symmetric relative to contralateral side), WWP   pressure wound from the LLE immobilizer posteriorly proximal to the knee.  Vascular: Peripheral pulses palpable 2+ bilaterally        LABS:    CARDIAC MARKERS:                                9.2    10.14 )-----------( 203      ( 16 Mar 2025 11:40 )             31.9     03-16    135  |  98  |  28[H]  ----------------------------<  148[H]  4.2   |  23  |  3.43[H]    Ca    7.6[L]      16 Mar 2025 11:40  Phos  2.8     03-16  Mg     2.1     03-16      proBNP:   Lipid Profile:   HgA1c:   TSH:             TELEMETRY: 	    ECG:  	  RADIOLOGY:   DIAGNOSTIC TESTING:  [ ] Echocardiogram:  [ ]  Catheterization:  [ ] Stress Test:    OTHER:

## 2025-03-18 NOTE — PROGRESS NOTE ADULT - SUBJECTIVE AND OBJECTIVE BOX
Date of Service: 03-18-25 @ 13:46    Patient is a 74y old  Male who presents with a chief complaint of ESRD requiring HD, uremia (18 Mar 2025 11:13)      Any change in ROS: feels constipated:"   no sob;   has some pain in legs  ;       MEDICATIONS  (STANDING):  albuterol/ipratropium for Nebulization 3 milliLiter(s) Nebulizer every 6 hours  aspirin  chewable 81 milliGRAM(s) Oral daily  atorvastatin 40 milliGRAM(s) Oral at bedtime  chlorhexidine 2% Cloths 1 Application(s) Topical daily  clopidogrel Tablet 75 milliGRAM(s) Oral daily  epoetin aleah-epbx (RETACRIT) Injectable 94440 Unit(s) IV Push <User Schedule>  guaiFENesin ER 1200 milliGRAM(s) Oral every 12 hours  hydrOXYzine hydrochloride 25 milliGRAM(s) Oral three times a day  ibuprofen  Tablet. 400 milliGRAM(s) Oral every 12 hours  mupirocin 2% Ointment 1 Application(s) Topical <User Schedule>  oxyCODONE  ER Tablet 30 milliGRAM(s) Oral every 12 hours  pantoprazole    Tablet 40 milliGRAM(s) Oral before breakfast  polyethylene glycol 3350 17 Gram(s) Oral daily  senna 2 Tablet(s) Oral at bedtime    MEDICATIONS  (PRN):  acetaminophen     Tablet .. 650 milliGRAM(s) Oral every 6 hours PRN Temp greater or equal to 38C (100.4F), Mild Pain (1 - 3)  benzocaine/menthol Lozenge 1 Lozenge Oral three times a day PRN Sore Throat  bisacodyl 5 milliGRAM(s) Oral every 12 hours PRN Constipation  oxyCODONE    IR 10 milliGRAM(s) Oral every 4 hours PRN Moderate Pain (4 - 6)  sodium chloride 0.9% lock flush 10 milliLiter(s) IV Push every 1 hour PRN Pre/post blood products, medications, blood draw, and to maintain line patency    Vital Signs Last 24 Hrs  T(C): 36.8 (18 Mar 2025 10:20), Max: 36.8 (18 Mar 2025 10:20)  T(F): 98.3 (18 Mar 2025 10:20), Max: 98.3 (18 Mar 2025 10:20)  HR: 89 (18 Mar 2025 10:20) (69 - 92)  BP: 123/63 (18 Mar 2025 10:20) (123/63 - 174/78)  BP(mean): --  RR: 18 (18 Mar 2025 10:20) (18 - 18)  SpO2: 97% (18 Mar 2025 10:20) (96% - 97%)    Parameters below as of 18 Mar 2025 10:20  Patient On (Oxygen Delivery Method): room air        I&O's Summary    17 Mar 2025 07:01  -  18 Mar 2025 07:00  --------------------------------------------------------  IN: 0 mL / OUT: 700 mL / NET: -700 mL          Physical Exam:   GENERAL: NAD, well-groomed, well-developed  HEENT: VINNY/   Atraumatic, Normocephalic  ENMT: No tonsillar erythema, exudates, or enlargement; Moist mucous membranes, Good dentition, No lesions  NECK: Supple, No JVD, Normal thyroid  CHEST/LUNG: Clear to auscultaion  CVS: Regular rate and rhythm; No murmurs, rubs, or gallops  GI: : Soft, Nontender, Nondistended; Bowel sounds present  NERVOUS SYSTEM:  Alert & Oriented X3  EXTREMITIES: cyanosed toes; gangrene at the tips to me  LYMPH: No lymphadenopathy noted  SKIN: No rashes or lesions  ENDOCRINOLOGY: No Thyromegaly  PSYCH: Appropriate    Labs:                              9.2    10.14 )-----------( 203      ( 16 Mar 2025 11:40 )             31.9                         9.0    11.98 )-----------( 216      ( 15 Mar 2025 10:30 )             31.2     03-16    135  |  98  |  28[H]  ----------------------------<  148[H]  4.2   |  23  |  3.43[H]  03-15    134[L]  |  99  |  23  ----------------------------<  161[H]  5.3   |  22  |  2.84[H]        CAPILLARY BLOOD GLUCOSE                rad< from: Xray Chest 1 View- PORTABLE-Urgent (Xray Chest 1 View- PORTABLE-Urgent .) (03.01.25 @ 22:48) >      INTERPRETATION:  EXAMINATION: XR CHEST URGENT    CLINICAL INDICATION: cough    TECHNIQUE: Single frontal, portable view of the chest was obtained.    COMPARISON: Chest x-ray 2/20/2025.    FINDINGS:  Right-sided tunneled hemodialysis catheter terminates in the SVC.  The heart is not accurately assessed in this AP projection.  The lungs are clear.  There is no pneumothorax or pleural effusion.  No acute bony abnormality.    IMPRESSION:  Clear lungs.    --- End of Report ---          GILBERT LEON MD; Resident Radiologist  This document has been electronically signed.   JESUS HALEY DO; Attending Interventional Radiologist  This document has been electronically signed. Mar  2 2025  9:37AM    < end of copied text >        RECENT CULTURES:  03-15 @ 07:08 Catheterized Catheterized                <10,000 CFU/mL Normal Urogenital Catie          RESPIRATORY CULTURES:          Studies  Chest X-RAY  CT SCAN Chest   Venous Dopplers: LE:   CT Abdomen  Others

## 2025-03-18 NOTE — PROGRESS NOTE ADULT - PROBLEM SELECTOR PLAN 6
-Monitor leukocytosis/fever curve  -CXR as above, no clear infiltrate  -ID f/u.  3/9: WBC slowly downtrending  3/10: con tto downtrend  3/11: wbc is slightly high o fver;  3/12: wbc increased further   no fever:  3/13: WBC spontaneously downtrended today  3/14: wbc slightly high ; no fever;  3/15; started on zosyn  3/16: urine culture is negative:  antibiotics dced by ID  3/17: wbc DOWNTRENDED  3/18: no new WBC now:

## 2025-03-18 NOTE — PROGRESS NOTE ADULT - ASSESSMENT

## 2025-03-18 NOTE — PROGRESS NOTE ADULT - PROBLEM SELECTOR PLAN 4
-Per vascular  -S/p b/l iliac stent placement on 3/3/25  -Podiatry f/u.  3/9: has cyanosed digits:  defer to vascular  /: vascular following  3/18: digits remains cyanosed and tip gangrenous:  defer to primary team

## 2025-03-18 NOTE — PROGRESS NOTE ADULT - ASSESSMENT
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  came in after fall and for HD.     2/20 CTH neg   \Na 123 --> now 133   A1c 5.7   TSH WNL 3.46   o/e 2/21 AAOx2, uppers 4/5, lowers limited .  2/23; family bedside upset that he has pain in leg after he was moved.  patient going for imaging now.   s/p R IJ permacath by IR 2/25 2/27 AAOx3   sopoke with family bedisde 3/17 wife says mental status okay, sometimes up and down but good   3/18 was told he has "new neuropathy" spoke iwth patient and wife at bedside. states its the same from before not a new issue.      Imrpssion  1) AMS, waxing and waing , likely multifactorial from infection, metabolic/electrolyte derrangements/ delerium / medication effect , ureemia which is imporving   2) polio  3) fall 2/2 weakness  4) hyponatremia to 123 2/20  improved 133 -->136        - AVF outpatient     f/u vascular; no vascluar options ; f/u with Messi from vasculare to schedule outpatient procedure   - on DAPT   - was getting oxycodone ER 30mg BID and oxy PRN IR i10 and dilauded PRN ;   consider gabapentin 200mg TID or lyrica 50mg BID fo nueorpathy if no objection ; patient states he still has pain but its better.  vascular has seen patient.  poor prognosis of LLE   - f/u psych   - limit sedating meds   - continue to monitor and correct metabolic derrangements .  - delerium precuations.   - frequent re orientation  - check b12, RPR, ammonia level   - will consdier MRI brain or EEG if doesn't improve but seems to have been improving   - PT/OT   - check FS, glucose control <180  - GI/DVT ppx  - Thank you for allowing me to participate in the care of this patient. Call with questions.   dc planning [facility] needs auth ; Freddie mojica   spoke to patient and wife 3/18   Paul Limon MD  Vascular Neurology  Office: [phone]

## 2025-03-18 NOTE — PROGRESS NOTE ADULT - SUBJECTIVE AND OBJECTIVE BOX
Patient is a 74y old  Male who presents with a chief complaint of ESRD requiring HD, uremia (18 Mar 2025 13:46)      SUBJECTIVE / OVERNIGHT EVENTS: LLE paraesthesias are chronic, will d/w CM re dc planning to [facility]     MEDICATIONS  (STANDING):  albuterol/ipratropium for Nebulization 3 milliLiter(s) Nebulizer every 6 hours  aspirin  chewable 81 milliGRAM(s) Oral daily  atorvastatin 40 milliGRAM(s) Oral at bedtime  chlorhexidine 2% Cloths 1 Application(s) Topical daily  clopidogrel Tablet 75 milliGRAM(s) Oral daily  epoetin aleah-epbx (RETACRIT) Injectable 96355 Unit(s) IV Push <User Schedule>  guaiFENesin ER 1200 milliGRAM(s) Oral every 12 hours  hydrOXYzine hydrochloride 25 milliGRAM(s) Oral three times a day  ibuprofen  Tablet. 400 milliGRAM(s) Oral every 12 hours  mupirocin 2% Ointment 1 Application(s) Topical <User Schedule>  oxyCODONE  ER Tablet 30 milliGRAM(s) Oral every 12 hours  pantoprazole    Tablet 40 milliGRAM(s) Oral before breakfast  polyethylene glycol 3350 17 Gram(s) Oral daily  senna 2 Tablet(s) Oral at bedtime    MEDICATIONS  (PRN):  acetaminophen     Tablet .. 650 milliGRAM(s) Oral every 6 hours PRN Temp greater or equal to 38C (100.4F), Mild Pain (1 - 3)  benzocaine/menthol Lozenge 1 Lozenge Oral three times a day PRN Sore Throat  bisacodyl 5 milliGRAM(s) Oral every 12 hours PRN Constipation  oxyCODONE    IR 10 milliGRAM(s) Oral every 4 hours PRN Moderate Pain (4 - 6)  sodium chloride 0.9% lock flush 10 milliLiter(s) IV Push every 1 hour PRN Pre/post blood products, medications, blood draw, and to maintain line patency      Vital Signs Last 24 Hrs  T(F): 98.3 (03-18-25 @ 10:20), Max: 98.3 (03-18-25 @ 10:20)  HR: 89 (03-18-25 @ 10:20) (69 - 92)  BP: 123/63 (03-18-25 @ 10:20) (123/63 - 174/78)  RR: 18 (03-18-25 @ 10:20) (18 - 18)  SpO2: 97% (03-18-25 @ 10:20) (96% - 97%)  Telemetry:   CAPILLARY BLOOD GLUCOSE        I&O's Summary    17 Mar 2025 07:01  -  18 Mar 2025 07:00  --------------------------------------------------------  IN: 0 mL / OUT: 700 mL / NET: -700 mL        PHYSICAL EXAM:  GENERAL: NAD, well-developed  HEAD:  Atraumatic, Normocephalic  EYES: EOMI, PERRLA, conjunctiva and sclera clear  NECK: Supple, No JVD  CHEST/LUNG: Clear to auscultation bilaterally; No wheeze  HEART: Regular rate and rhythm; No murmurs, rubs, or gallops  ABDOMEN: Soft, Nontender, Nondistended; Bowel sounds present  EXTREMITIES:  2+ Peripheral Pulses, No clubbing, cyanosis, or edema  PSYCH: AAOx3  NEUROLOGY: non-focal  SKIN: No rashes or lesions

## 2025-03-18 NOTE — PROGRESS NOTE ADULT - PROBLEM SELECTOR PLAN 1
pulm wise he remains ok ; on room air : ct chest showed: Mucoid impaction and airway associated subpleural groundglass opacities, most prominent in left lower lobe and to a lesser degree in right lower lobe. Mild bronchial wall thickening, likely inflammatory.  clinically he seems to be doing ok : no resp symptoms:  he would need repeat ct scan chest  in 6 weeks time  3/15: no new change  ; on room air;  alert and awake  3/16: no overnight resp events:   he is on room air:  lings are clear:  doing well ;pulm wise:  3/17: has some body ache today  ;   pulm wise stable:  3/18: feels constipated:  on room air:  has some pain in legs oo :  on HD:  on laxatives

## 2025-03-18 NOTE — CHART NOTE - NSCHARTNOTEFT_GEN_A_CORE
NUTRITION FOLLOW UP NOTE    PATIENT SEEN FOR: first malnutrition follow up    SOURCE: [x] Patient  [x] Current Medical Record  [] RN  [x] Family/support person at bedside (wife)  [] Patient unavailable/inappropriate  [] Other:    CHART REVIEWED/EVENTS NOTED.  [] No changes to nutrition care plan to note  [x] Nutrition Status:  -s/p RLE angiogram w/ b/l iliac artery stents 3/3  -last HD 3/17    DIET ORDER:   Diet, Regular:   1000mL Fluid Restriction ([id number])  No Concentrated Potassium (25)    Previous Diet Order: Renal, 1 L fluid restriction (-3/7) - liberalized by Renal 2/2 hypophosphatemia    CURRENT DIET ORDER IS:  [] Appropriate:  [] Inadequate:  [x] Other: see recommendations below    NUTRITION INTAKE/PROVISION:  [x] PO: Pt reports appetite is staring to "come back" and now consuming ~50% of meals  -Pt doesn't love the Nepro but wife states he is able to drink 1-1.5/day; would like it better if able to blend with ice into a smoothie - noted Nepros at bedside but no active order?  -good tolerance of Smoothie of the Day  -declined to offer any specific food preferences  [] Enteral Nutrition:  [] Parenteral Nutrition:    ANTHROPOMETRICS:  Drug Dosing Weight  Height (cm): 172.7 (03 Mar 2025 10:32)  Weight (kg): 90.7 (03 Mar 2025 10:32)  BMI (kg/m2): 30.4 (03 Mar 2025 10:32)  BSA (m2): 2.04 (03 Mar 2025 10:32)  Weights:   Daily Weight in k (-), Weight in k.6 (), Weight in k.8 (-), Weight in k.5 (), Weight in k.9 (), Weight in k.3 (), 85 kg ()  *wt's elevated 2/2 edema and fluid shifts from HD     MEDICATIONS:  MEDICATIONS  (STANDING):  albuterol/ipratropium for Nebulization 3 milliLiter(s) Nebulizer every 6 hours  aspirin  chewable 81 milliGRAM(s) Oral daily  atorvastatin 40 milliGRAM(s) Oral at bedtime  chlorhexidine 2% Cloths 1 Application(s) Topical daily  clopidogrel Tablet 75 milliGRAM(s) Oral daily  epoetin aleah-epbx (RETACRIT) Injectable 25921 Unit(s) IV Push <User Schedule>  guaiFENesin ER 1200 milliGRAM(s) Oral every 12 hours  hydrOXYzine hydrochloride 25 milliGRAM(s) Oral three times a day  ibuprofen  Tablet. 400 milliGRAM(s) Oral every 12 hours  mupirocin 2% Ointment 1 Application(s) Topical <User Schedule>  oxyCODONE  ER Tablet 30 milliGRAM(s) Oral every 12 hours  pantoprazole    Tablet 40 milliGRAM(s) Oral before breakfast  polyethylene glycol 3350 17 Gram(s) Oral daily  senna 2 Tablet(s) Oral at bedtime    MEDICATIONS  (PRN):  acetaminophen     Tablet .. 650 milliGRAM(s) Oral every 6 hours PRN Temp greater or equal to 38C (100.4F), Mild Pain (1 - 3)  benzocaine/menthol Lozenge 1 Lozenge Oral three times a day PRN Sore Throat  bisacodyl 5 milliGRAM(s) Oral every 12 hours PRN Constipation  oxyCODONE    IR 10 milliGRAM(s) Oral every 4 hours PRN Moderate Pain (4 - 6)  sodium chloride 0.9% lock flush 10 milliLiter(s) IV Push every 1 hour PRN Pre/post blood products, medications, blood draw, and to maintain line patency    NUTRITIONALLY PERTINENT LABS:   Na135 mmol/L Glu 148 mg/dL[H] K+ 4.2 mmol/L Cr  3.43 mg/dL[H] BUN 28 mg/dL[H]  Phos 2.8 mg/dL    A1C with Estimated Average Glucose Result: 5.7 % (24 @ 06:44)  A1C with Estimated Average Glucose Result: 4.9 % (10-09-24 @ 13:30)    NUTRITIONALLY PERTINENT MEDICATIONS/LABS:  [x] Reviewed  [x] Relevant notes on medications/labs:  -Potassium WNL; Phos WNL  -senna and Miralax bowel regimen    EDEMA:  [x] Reviewed  [x] Relevant notes: left ankle/foot 2+ edema (3/17 RN flowsheets)    GI/ I&O:  [x] Reviewed  [x] Relevant notes: Pt reports constipation, last BM 3/15 per RN flowsheets  [] Other:    SKIN:   [] No pressure injuries documented, per nursing flowsheet  [x] Pressure injury previously noted: WOCN FU 3/11 - left buttock deep tissue injury, sacrum and right ischium unstageable, BLE severe PAD with wounds  [] Change in pressure injury documentation:  [] Other:    ESTIMATED NEEDS:  [x] No change:  [] Updated:  Energy: 9022-6968 kcal/day (30-35 kcal/kg)  Protein: 83.8-97.7 g/day (1.2-1.4 g/kg)  Fluid:   ml/day or [x] defer to team  Based on: IBW 69.8 kg    NUTRITION DIAGNOSIS:  [x] Prior Dx: 1) Moderate Chronic Malnutrition and 2) increased nutrient needs  [] New Dx:    EDUCATION:  [x] Yes: adequate calorie/protein intake, fluid restriction, oral nutrition supplements  [] Not appropriate/warranted    NUTRITION CARE PLAN:  1. Diet: recommend low sodium, regular diet if K+ and Phos remain WNL with fluid restriction per MD team  2. Supplements: recommend Nepro 2x/day (420 kcal, 19 g Pro/8oz)  3. Multivitamin/mineral supplementation: recommend Nephro-margaret daily for wound healing  4. add/adjust bowel regimen PRN    [] Achieved - Continue current nutrition intervention(s)  [] Current medical condition precludes nutrition intervention at this time.    MONITORING AND EVALUATION:   RD remains available upon request and will follow up per protocol.    Guadalupe Valderrama MPH, RD, CDN  Available on [facility] TEAMS

## 2025-03-18 NOTE — PROGRESS NOTE ADULT - SUBJECTIVE AND OBJECTIVE BOX
Tolerated 700cc net UF yesterday with HD, as ordered      VITAL:  T(C): , Max: 36.8 (03-18-25 @ 10:20)  T(F): , Max: 98.3 (03-18-25 @ 10:20)  HR: 89 (03-18-25 @ 10:20)  BP: 123/63 (03-18-25 @ 10:20)  BP(mean): --  RR: 18 (03-18-25 @ 10:20)  SpO2: 97% (03-18-25 @ 10:20)  Wt(kg): --      PHYSICAL EXAM:  Constitutional: alert, NAD, in good spirits  HEENT: NCAT, DMM  Neck: Supple, No JVD  Respiratory: CTA-b/l  Cardiovascular: RRR s1s2, no m/r/g  Gastrointestinal: BS+, soft, NT/ND  : (+)suprapubic cath  Extremities: No peripheral edema b/l  Neurological: reduced generalized strength  Back: no CVAT b/l  Skin: (+)cyanotic changes b/l feet  Access: RIJ tunneled cath-accessed    LABS:    Na(135)/K(4.2)/Cl(98)/HCO3(23)/BUN(28)/Cr(3.43)Glu(148)/Ca(7.6)/Mg(2.1)/PO4(2.8)    03-16 @ 11:40  Na(134)/K(5.3)/Cl(99)/HCO3(22)/BUN(23)/Cr(2.84)Glu(161)/Ca(7.4)/Mg(--)/PO4(--)    03-15 @ 19:01      IMPRESSION: 74M w/ polio, neurogenic bladder/suprapubic catheter, 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 Last dialyzed yesterday; due for next HD tomorrow    (2)Anemia - s/p IV iron; on Retacrit with HD    (3)PAD - s/p LE bypass 3/3/25 - cyanotic changes of toes b/l - for further management after discharge    (4)CV - normotensive/acceptable volume; tolerating gentle UF with HD    (5)Neuro - chronic parasthesias      RECOMMEND:   (1)D/C planning per primary team  (2)Next HD tomorrow - inpatient vs outpatient - 0.7kg UF as able; Retacrit with HD              Rafita Denny MD  [facility]  Office/on call physician: [phone]  Cell (7a-7p): [phone]       Tolerated 700cc net UF yesterday with HD, as ordered  complains of constipation and LLE swelling    VITAL:  T(C): , Max: 36.8 (03-18-25 @ 10:20)  T(F): , Max: 98.3 (03-18-25 @ 10:20)  HR: 89 (03-18-25 @ 10:20)  BP: 123/63 (03-18-25 @ 10:20)  BP(mean): --  RR: 18 (03-18-25 @ 10:20)  SpO2: 97% (03-18-25 @ 10:20)  Wt(kg): --      PHYSICAL EXAM:  Constitutional: alert, NAD, in good spirits  HEENT: NCAT, DMM  Neck: Supple, No JVD  Respiratory: CTA-b/l  Cardiovascular: RRR s1s2, no m/r/g  Gastrointestinal: BS+, soft, NT/ND  : (+)suprapubic cath  Extremities: 2+ b/l LE edema  Neurological: reduced generalized strength  Back: no CVAT b/l  Skin: (+)cyanotic changes b/l feet  Access: RIJ tunneled cath    LABS:    Na(135)/K(4.2)/Cl(98)/HCO3(23)/BUN(28)/Cr(3.43)Glu(148)/Ca(7.6)/Mg(2.1)/PO4(2.8)    03-16 @ 11:40  Na(134)/K(5.3)/Cl(99)/HCO3(22)/BUN(23)/Cr(2.84)Glu(161)/Ca(7.4)/Mg(--)/PO4(--)    03-15 @ 19:01      IMPRESSION: 74M w/ polio, neurogenic bladder/suprapubic catheter, 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 Last dialyzed yesterday; due for next HD tomorrow    (2)Anemia - s/p IV iron; on Retacrit with HD    (3)PAD - s/p LE bypass 3/3/25 - cyanotic changes of toes b/l - for further management after discharge    (4)CV - LE edema is multifactorial from limited attempted UF with HD, as well as malnutrition/associated hypoalbuminemia/3rd spacing.     (5)Neuro - chronic parasthesias    (6)Constipation - ordered for Lactulose    RECOMMEND:   (1)Lactulose as ordered  (2)Encouraged to increase dietary protein intake  (3)Next HD tomorrow - will increase goal UF to 1.2L UF as able to mitigate swelling; Retacrit with HD              Rafita Denny MD  [facility]  Office/on call physician: [phone]  Cell (7a-7p): [phone]

## 2025-03-19 NOTE — PROGRESS NOTE ADULT - SUBJECTIVE AND OBJECTIVE BOX
Overnight events noted      VITAL:  T(C): , Max: 36.8 (03-18-25 @ 10:20)  T(F): , Max: 98.3 (03-18-25 @ 10:20)  HR: 96 (03-19-25 @ 00:00)  BP: 172/79 (03-19-25 @ 00:00)  BP(mean): --  RR: 18 (03-19-25 @ 00:00)  SpO2: 95% (03-19-25 @ 00:00)  Wt(kg): --      PHYSICAL EXAM:  Constitutional: alert, NAD, in good spirits  HEENT: NCAT, DMM  Neck: Supple, No JVD  Respiratory: CTA-b/l  Cardiovascular: RRR s1s2, no m/r/g  Gastrointestinal: BS+, soft, NT/ND  : (+)suprapubic cath  Extremities: 2+ b/l LE edema  Neurological: reduced generalized strength  Back: no CVAT b/l  Skin: (+)cyanotic changes b/l feet  Access: RIJ tunneled cath    LABS:  Na(135)/K(4.2)/Cl(98)/HCO3(23)/BUN(28)/Cr(3.43)Glu(148)/Ca(7.6)/Mg(2.1)/PO4(2.8)    03-16 @ 11:40        IMPRESSION: 74M w/ polio, neurogenic bladder/suprapubic catheter, 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 Due for next HD today    (2)Anemia - s/p IV iron; on Retacrit with HD    (3)PAD - s/p LE bypass 3/3/25 - cyanotic changes of toes b/l - for further management after discharge    (4)CV - LE edema is multifactorial from limited attempted UF with HD, as well as malnutrition/associated hypoalbuminemia/3rd spacing.     (5)Neuro - chronic parasthesias    (6)Constipation - ordered for Lactulose      RECOMMEND:   (1)Lactulose as ordered  (2)Encouraged to increase dietary protein intake  (3)Next HD today - increased goal UF to 1.2L UF as able to mitigate swelling; Retacrit with HD            Rafita Denny MD  [facility]  Office/on call physician: [phone]  Cell (7a-7p): [phone]       Seen on HD  s/p several BMs last night; feels better now    VITAL:  T(C): , Max: 36.8 (03-18-25 @ 10:20)  T(F): , Max: 98.3 (03-18-25 @ 10:20)  HR: 96 (03-19-25 @ 00:00)  BP: 172/79 (03-19-25 @ 00:00)  BP(mean): --  RR: 18 (03-19-25 @ 00:00)  SpO2: 95% (03-19-25 @ 00:00)  Wt(kg): --      PHYSICAL EXAM:  Constitutional: alert, NAD, in good spirits  HEENT: NCAT, DMM  Neck: Supple, No JVD  Respiratory: CTA-b/l  Cardiovascular: RRR s1s2, no m/r/g  Gastrointestinal: BS+, soft, NT/ND  : (+)suprapubic cath  Extremities: 2+ b/l LE edema  Neurological: reduced generalized strength  Back: no CVAT b/l  Skin: (+)cyanotic changes b/l feet  Access: RIJ tunneled cath-accessed    LABS:  Na(135)/K(4.2)/Cl(98)/HCO3(23)/BUN(28)/Cr(3.43)Glu(148)/Ca(7.6)/Mg(2.1)/PO4(2.8)    03-16 @ 11:40        IMPRESSION: 74M w/ polio, neurogenic bladder/suprapubic catheter, 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 On HD now    (2)Anemia - s/p IV iron; on Retacrit with HD    (3)PAD - s/p LE bypass 3/3/25 - cyanotic changes of toes b/l - for further management after discharge    (4)CV - LE edema is multifactorial from limited attempted UF with HD, as well as malnutrition/associated hypoalbuminemia/3rd spacing. Prosource could help here    (5)Neuro - reduced generalized strength - getting PT/awaiting [facility]    (6)Constipation - improved, with Lactulose      RECOMMEND:   (1)Add Prosource 1packet bid - counseled patient regarding the benefit of the protein in improving strength and lessening edema  (2)HD today - increased goal UF to 1.2L UF as able to mitigate swelling; Retacrit with HD            Rafita Denny MD  [facility]  Office/on call physician: [phone]  Cell (7a-7p): [phone]

## 2025-03-19 NOTE — PROGRESS NOTE ADULT - SUBJECTIVE AND OBJECTIVE BOX
[facility]-- WOUND TEAM -- FOLLOW UP NOTE  --------------------------------------------------------------------------------    24 hour events/subjective:          Diet:  Diet, Regular:   1000mL Fluid Restriction ([id number])  Low Sodium  No Carb Prosource TF     Qty per Day:  2  Supplement Feeding Modality:  Oral  Nepro Cans or Servings Per Day:  2       Frequency:  Daily (03-19-25 @ 09:42)      ROS: General/ SKIN/ MSK/ Vasc see HPI  all other systems negative      ALLERGIES & MEDICATIONS  --------------------------------------------------------------------------------    No Known Allergies        STANDING INPATIENT MEDICATIONS  albuterol/ipratropium for Nebulization 3 milliLiter(s) Nebulizer every 6 hours  aspirin  chewable 81 milliGRAM(s) Oral daily  atorvastatin 40 milliGRAM(s) Oral at bedtime  chlorhexidine 2% Cloths 1 Application(s) Topical daily  clopidogrel Tablet 75 milliGRAM(s) Oral daily  epoetin aleah-epbx (RETACRIT) Injectable 31551 Unit(s) IV Push <User Schedule>  guaiFENesin ER 1200 milliGRAM(s) Oral every 12 hours  hydrOXYzine hydrochloride 25 milliGRAM(s) Oral three times a day  ibuprofen  Tablet. 400 milliGRAM(s) Oral every 12 hours  lactulose Syrup 10 Gram(s) Oral daily  mupirocin 2% Ointment 1 Application(s) Topical <User Schedule>  oxyCODONE  ER Tablet 30 milliGRAM(s) Oral every 12 hours  pantoprazole    Tablet 40 milliGRAM(s) Oral before breakfast  polyethylene glycol 3350 17 Gram(s) Oral daily  senna 2 Tablet(s) Oral at bedtime  trimethoprim  160 mG/sulfamethoxazole 800 mG 1 Tablet(s) Oral once      PRN INPATIENT MEDICATION  acetaminophen  Tablet  650 milliGRAM(s) Oral every 6 hours PRN  benzocaine/menthol Lozenge 1 Lozenge Oral three times a day PRN  bisacodyl 5 milliGRAM(s) Oral every 12 hours PRN  HYDROmorphone  Injectable 2 milliGRAM(s) IV Push every 4 hours PRN  oxyCODONE IR 10 milliGRAM(s) Oral every 4 hours PRN  sodium chloride 0.9% lock flush 10 milliLiter(s) IV Push every 1 hour PRN        VITALS/PHYSICAL EXAM  --------------------------------------------------------------------------------  T(C): 36.3 (03-19-25 @ 16:10), Max: 36.4 (03-19-25 @ 00:00)  HR: 94 (03-19-25 @ 16:10) (84 - 96)  BP: 148/89 (03-19-25 @ 16:10) (123/68 - 172/79)  RR: 18 (03-19-25 @ 16:10) (18 - 18)  SpO2: 97% (03-19-25 @ 16:10) (95% - 97%)  Wt(kg): --                        A1C with Estimated Average Glucose Result: 5.7 % (12-21-24 @ 06:44)  A1C with Estimated Average Glucose Result: 4.9 % (10-09-24 @ 13:30)   [facility]-- WOUND TEAM -- FOLLOW UP NOTE  --------------------------------------------------------------------------------    24 hour events/subjective:    alert  afebrile  incontinent  improving pain  tolerating po w/o n/v but poor appetite  wife putting homeopathic compound on wounds, advised patient she should stop     all questions answered to their expressed satisfaction        Diet:  Diet, Regular:   1000mL Fluid Restriction ([id number])  Low Sodium  No Carb Prosource TF     Qty per Day:  2  Supplement Feeding Modality:  Oral  Nepro Cans or Servings Per Day:  2       Frequency:  Daily (03-19-25 @ 09:42)      ROS: General/ SKIN/ MSK/ Vasc see HPI  all other systems negative      ALLERGIES & MEDICATIONS  --------------------------------------------------------------------------------    No Known Allergies        STANDING INPATIENT MEDICATIONS  albuterol/ipratropium for Nebulization 3 milliLiter(s) Nebulizer every 6 hours  aspirin  chewable 81 milliGRAM(s) Oral daily  atorvastatin 40 milliGRAM(s) Oral at bedtime  chlorhexidine 2% Cloths 1 Application(s) Topical daily  clopidogrel Tablet 75 milliGRAM(s) Oral daily  epoetin aleah-epbx (RETACRIT) Injectable 08679 Unit(s) IV Push <User Schedule>  guaiFENesin ER 1200 milliGRAM(s) Oral every 12 hours  hydrOXYzine hydrochloride 25 milliGRAM(s) Oral three times a day  ibuprofen  Tablet. 400 milliGRAM(s) Oral every 12 hours  lactulose Syrup 10 Gram(s) Oral daily  mupirocin 2% Ointment 1 Application(s) Topical <User Schedule>  oxyCODONE  ER Tablet 30 milliGRAM(s) Oral every 12 hours  pantoprazole    Tablet 40 milliGRAM(s) Oral before breakfast  polyethylene glycol 3350 17 Gram(s) Oral daily  senna 2 Tablet(s) Oral at bedtime  trimethoprim  160 mG/sulfamethoxazole 800 mG 1 Tablet(s) Oral once      PRN INPATIENT MEDICATION  acetaminophen  Tablet  650 milliGRAM(s) Oral every 6 hours PRN  benzocaine/menthol Lozenge 1 Lozenge Oral three times a day PRN  bisacodyl 5 milliGRAM(s) Oral every 12 hours PRN  HYDROmorphone  Injectable 2 milliGRAM(s) IV Push every 4 hours PRN  oxyCODONE IR 10 milliGRAM(s) Oral every 4 hours PRN  sodium chloride 0.9% lock flush 10 milliLiter(s) IV Push every 1 hour PRN        VITALS/PHYSICAL EXAM  --------------------------------------------------------------------------------  T(C): 36.3 (03-19-25 @ 16:10), Max: 36.4 (03-19-25 @ 00:00)  HR: 94 (03-19-25 @ 16:10) (84 - 96)  BP: 148/89 (03-19-25 @ 16:10) (123/68 - 172/79)  RR: 18 (03-19-25 @ 16:10) (18 - 18)  SpO2: 97% (03-19-25 @ 16:10) (95% - 97%)  Wt(kg): --        NAD,  A&Ox3, Obese, WD/ WN/ W  [facility] P500 bed  HEENT:  NC/AT, EOMI, sclera clear, mucosa moist, throat clear, trachea midline, neck supple  Respiratory: nonlabored w/ equal chest rise  Gastrointestinal: soft NT/ND (+)colostomy pink / viable   : (+) superpubic cath     Scrotum w/fading hyperpigmented skin     no blistering or drainage  No odor, erythema, increased warmth, tenderness, induration, fluctuance, nor crepitus  Neurology:  weakened strength & sensation grossly intact  Psych: calm/ appropriate  Musculoskeletal: FROM, no deformities/ contractures  Vascular: BLE equally warm/cool,  no clubbing      (+)BLE edema equal       no BLE DP/PT pulses palpable      BLE demarcation noted across toes and heels w/purple skin changes /black eschar      no blistering or drainage  No odor, erythema, increased warmth, tenderness, induration, fluctuance, nor crepitus   Skin: thin, dry, pale, frail,  ecchymosis w/o hematoma  Sacrum into Lt buttocks into perineum evolving Unstageable pressure injury     black dry eschar w/ fading purple and pink denuded skin changes peripherally     10cm x 12cm x 0.1cm  Rt Ischium unstageable pressure injury     moist pink granular tissue w/ grey slough and denuded pink skin changes     pt w/ excoriated skin changes in periwound buttocks area     2cm x 4cm x0.1cm  Lt Buttocks fading DTI w/ fading purple maroon and pink denuded skin changes     2cm x 2cm      no blistering or drainage  No odor, erythema, increased warmth, tenderness, induration, fluctuance, nor crepitus  Lt upper thigh wrapping posteriorly w/ 2 wounds    Upper posterior thigh 4cm x 8cmx  0.1cm  grey and white slough and partial thickness skin changes    inferiorly starting anterior medially and wrapping around posterior        2cm x 15cm fading purple / maroon skin changes w/ blistering  Lt Knee  (6cm x 7cm) & Lateral upper calf (5cm x 4cm) and Lt calf to ankle (10cm x 3cm)     lifitng dry eschar purple/ black w/o blistering or drainage  Lt calf to ankle (10cm x 3cm)  blistering purple / maroon skin changes    w/o  drainage  No odor, erythema, increased warmth, tenderness, induration, fluctuance, nor crepitus          A1C with Estimated Average Glucose Result: 5.7 % (12-21-24 @ 06:44)  A1C with Estimated Average Glucose Result: 4.9 % (10-09-24 @ 13:30)

## 2025-03-19 NOTE — PROGRESS NOTE ADULT - SUBJECTIVE AND OBJECTIVE BOX
Patient is a 74y old  Male who presents with a chief complaint of ESRD requiring HD, uremia (19 Mar 2025 11:11)      SUBJECTIVE / OVERNIGHT EVENTS: ptn w tan crusting around SPC , states its painful. started on po Bactrim, renally dosed by ID for cellulitis.     MEDICATIONS  (STANDING):  albuterol/ipratropium for Nebulization 3 milliLiter(s) Nebulizer every 6 hours  aspirin  chewable 81 milliGRAM(s) Oral daily  atorvastatin 40 milliGRAM(s) Oral at bedtime  chlorhexidine 2% Cloths 1 Application(s) Topical daily  clopidogrel Tablet 75 milliGRAM(s) Oral daily  epoetin aleah-epbx (RETACRIT) Injectable 35747 Unit(s) IV Push <User Schedule>  guaiFENesin ER 1200 milliGRAM(s) Oral every 12 hours  hydrOXYzine hydrochloride 25 milliGRAM(s) Oral three times a day  ibuprofen  Tablet. 400 milliGRAM(s) Oral every 12 hours  lactulose Syrup 10 Gram(s) Oral daily  mupirocin 2% Ointment 1 Application(s) Topical <User Schedule>  oxyCODONE  ER Tablet 30 milliGRAM(s) Oral every 12 hours  pantoprazole    Tablet 40 milliGRAM(s) Oral before breakfast  polyethylene glycol 3350 17 Gram(s) Oral daily  senna 2 Tablet(s) Oral at bedtime  trimethoprim  160 mG/sulfamethoxazole 800 mG 1 Tablet(s) Oral once    MEDICATIONS  (PRN):  acetaminophen     Tablet .. 650 milliGRAM(s) Oral every 6 hours PRN Temp greater or equal to 38C (100.4F), Mild Pain (1 - 3)  benzocaine/menthol Lozenge 1 Lozenge Oral three times a day PRN Sore Throat  bisacodyl 5 milliGRAM(s) Oral every 12 hours PRN Constipation  oxyCODONE    IR 10 milliGRAM(s) Oral every 4 hours PRN Moderate Pain (4 - 6)  sodium chloride 0.9% lock flush 10 milliLiter(s) IV Push every 1 hour PRN Pre/post blood products, medications, blood draw, and to maintain line patency      Vital Signs Last 24 Hrs  T(F): 97.3 (03-19-25 @ 11:25), Max: 98.2 (03-18-25 @ 16:00)  HR: 96 (03-19-25 @ 11:25) (84 - 96)  BP: 123/68 (03-19-25 @ 11:25) (123/68 - 172/79)  RR: 18 (03-19-25 @ 11:25) (18 - 18)  SpO2: 97% (03-19-25 @ 11:25) (95% - 97%)  Telemetry:   CAPILLARY BLOOD GLUCOSE        I&O's Summary    19 Mar 2025 07:01  -  19 Mar 2025 14:15  --------------------------------------------------------  IN: 0 mL / OUT: 1200 mL / NET: -1200 mL        PHYSICAL EXAM:  GENERAL: NAD, well-developed  HEAD:  Atraumatic, Normocephalic  EYES: EOMI, PERRLA, conjunctiva and sclera clear  NECK: Supple, No JVD  CHEST/LUNG: Clear to auscultation bilaterally; No wheeze  HEART: Regular rate and rhythm; No murmurs, rubs, or gallops  ABDOMEN: Soft, Nontender, Nondistended; Bowel sounds present  EXTREMITIES:  2+ Peripheral Pulses, No clubbing, cyanosis, or edema  PSYCH: AAOx3  NEUROLOGY: non-focal  SKIN: No rashes or lesions    LABS:                    RADIOLOGY & ADDITIONAL TESTS:    Imaging Personally Reviewed:    Consultant(s) Notes Reviewed:      Care Discussed with Consultants/Other Providers:

## 2025-03-19 NOTE — PROGRESS NOTE ADULT - ASSESSMENT
74M with history of HTN, polio with multiple subsequent problems including LE weakness requiring wheelchair, neurogenic bladder s/p suprapubic catheter, and colostomy s/p iatrogenic rectal injury 2/2023, and CKD 5 presenting to the ED with weakness and falls. Admitted for initiation of HD now s/p R IJ shiley placement on 2/17/25 and conversion to tunneled catheter on 2/25. Vascular surgery initially consulted for AVF creation; however, patient's worsening PAD with worsening ischemic changes is the more urgent issue now s/p b/l iliac stent placement on 3/3/25.     Wound Consult requested to assist w/ management of:  Sacral and Rt Ischial Unstageable Pressure Injury  Left Buttock DTI  BLE severe PAD w/ extensive wounds      Buttocks/ Sacrum/ Thighs continue w/ TRIAD BID and prn soiling        Continue w/ attends under pads and Pericare as per protocol  Lt knee wounds- Medihoney dressing QD  Feet /legs skin changes- Betadine  QD  Appreciate Vascular input- continue to follow up  Ortho Follow up for Knee fx  Feet f/u as per Podiatry/ Vascular  BLE elevation & Compression  Abx per Medicine/ ID  Consider MRI vs CT to r/o OM of sacrum  Consider Palliative for GOC and pain mngt  Moisturize intact skin w/ SWEEN cream BID  Nutrition Consult for optimization in pt w/ Moderate Protein Calorie Malnutrition,        encourage high quality protein, sandy/ prosource, MVI & Vit C to promote wound healing  Continue turning and positioning w/ offloading assistive devices as per protocol  Waffle Cushion to chair when oob to chair  Continue w/ low air loss pressure redistribution bed surface   Pt will need Group 2 mattress on hospital al bed and ROHO cushion for wheel chair upon discharge home  Care as per medicine, will follow w/ you  Upon discharge f/u as outpatient at [facility] [phone]  Seen w/ attng & RN and D/w team  Thank you for this consult  Danica Humphreys PA-C [id number]  Nights/ Weekends/ Holidays please call:  General Surgery Consult pager ([phone]) for emergencies  Wound PT for multilayer leg wrapping or VAC issues ([phone])   I spent  50minutes face to face w/ this pt of which more than 50% of the time was spent counseling & coordinating care of this pt.

## 2025-03-19 NOTE — PROGRESS NOTE ADULT - PROBLEM SELECTOR PLAN 1
pulm wise he remains ok ; on room air : ct chest showed: Mucoid impaction and airway associated subpleural groundglass opacities, most prominent in left lower lobe and to a lesser degree in right lower lobe. Mild bronchial wall thickening, likely inflammatory.  clinically he seems to be doing ok : no resp symptoms:  he would need repeat ct scan chest  in 6 weeks time  3/15: no new change  ; on room air;  alert and awake  3/16: no overnight resp events:   he is on room air:  lings are clear:  doing well ;pulm wise:  3/17: has some body ache today  ;   pulm wise stable:  3/18: feels constipated:  on room air:  has some pain in legs oo :  on HD:  on laxatives  3/19 ; constipation relieved:  doing  ok : no sob;

## 2025-03-19 NOTE — PROGRESS NOTE ADULT - PROBLEM SELECTOR PLAN 3
-Neuro following  -ABG 2/20 acceptable  -Pt lethargic 2/22, ABG never sent but AMS appears to be improving   -ABG 2/28 with no co2 retention.  3/18: resolved  3/19; doing pretty good:

## 2025-03-19 NOTE — PROGRESS NOTE ADULT - ASSESSMENT
74 year old male with CKD, sp polio, paraplegia with associated neurogenic bladder s/p suprapubic catheter who was admitted s/p fall on 2/16.   CKD - ESRD, now s/p DEYA [place] 2/17, on HD  ruled out cellulitis around suprapubic cath  2/20 s/p RRT for tremors and confusion -- pt with chronic tremors although notably worse  hyponatremia    mrsa negative   2/20 CXR with clear lungs   SPC site with chronic/stable inflammatory changes, no drainage - no sign of SSTI  CTA abd with occlusion of b/l external iliac arteries and b/l superficial femoral arteries with distal reconstitution at popliteal arteries; patent three vessel runoff of RLE with 2 vessel runoff of LLE with occlusion of L mid anterior tibial artery with distal reconstitution  Bcx negative to date   mental status at baseline  Vascular following for worsening PAD with worsening ischemic changes   -3/3 s/p RLE angiogram b/l iliac artery stents     3/16 no tenderness at suprapubic catheter site  no visible erythema at catheter site on exam  UA w/ many epithelial cells, urine culture negative  s/p zosyn 3/14-3/16  SPC site remains without erythema but today noted with some tan crusting on dressing  abd pain less but still represent, will treat for possible SPC site infection  remains afebrile    Recommendations:   Started on Bactrim DS 1 tab x1 today then bactrim SS 1 tab PO Q12h (renally dosed) to finish 7d on 3/25  HD per renal    Monitor temps/WBC  Continue rest of care per primary team       D/w Dr. Juan Ramirez M.D.  [facility]  Available on [employer] TEAMS - *PREFERRED*  [phone]  After 5pm on weekdays and all day on weekends - please call [phone]     Thank you for consulting us and involving us in the management of this patients case. In addition to reviewing history, imaging, documents, labs, microbiology, took into account antibiotic stewardship, local antibiogram and infection control strategies and potential transmission issues.

## 2025-03-19 NOTE — PROGRESS NOTE ADULT - SUBJECTIVE AND OBJECTIVE BOX
[facility]  SABINO Griffin Y. Patel, S. Shah, G. Casimir  [phone]  ([phone] - weekdays after 5pm and weekends)    Name: BRIAN DEMPSEY  Age/Gender: 74y Male  MRN: [id number]    Interval History:  Patient seen and examined this morning at .  States pain at SPC site less but still some pain.   Denies fever or any other complaints.   Had a soft BM per RN.   Notes reviewed  No concerning overnight events  Afebrile   Allergies: No Known Allergies    Objective:  Vitals:   T(F): 97.2 (03-19-25 @ 08:24), Max: 98.3 (03-18-25 @ 10:20)  HR: 84 (03-19-25 @ 08:24) (84 - 96)  BP: 150/79 (03-19-25 @ 08:24) (123/63 - 172/79)  RR: 18 (03-19-25 @ 08:24) (18 - 18)  SpO2: 96% (03-19-25 @ 08:24) (95% - 97%)  Physical Examination:  General: no acute distress  HEENT: normocephalic, atraumatic  Respiratory: breathing comfortably  Cardiovascular: S1 and S2 present  Gastrointestinal: NT, ND, SPC site with no erythema but dressing with some tan crusting  Extremities: ischemic changes LE   Skin: no visible rash    Laboratory Studies:  CBC:   WBC Trend:  10.14 03-16-25 @ 11:40  11.98 03-15-25 @ 10:30  13.26 03-14-25 @ 10:04  12.93 03-13-25 @ 09:58    CMP:   Creatinine: 3.43 mg/dL (03-16-25 @ 11:40)  Creatinine: 2.84 mg/dL (03-15-25 @ 19:01)    Microbiology: reviewed   Culture - Urine (collected 03-15-25 @ 07:08)  Source: Catheterized Catheterized  Final Report (03-16-25 @ 08:38):    <10,000 CFU/mL Normal Urogenital Catie    Radiology: reviewed     Medications:  acetaminophen     Tablet .. 650 milliGRAM(s) Oral every 6 hours PRN  albuterol/ipratropium for Nebulization 3 milliLiter(s) Nebulizer every 6 hours  aspirin  chewable 81 milliGRAM(s) Oral daily  atorvastatin 40 milliGRAM(s) Oral at bedtime  benzocaine/menthol Lozenge 1 Lozenge Oral three times a day PRN  bisacodyl 5 milliGRAM(s) Oral every 12 hours PRN  chlorhexidine 2% Cloths 1 Application(s) Topical daily  clopidogrel Tablet 75 milliGRAM(s) Oral daily  epoetin aleah-epbx (RETACRIT) Injectable 95965 Unit(s) IV Push <User Schedule>  guaiFENesin ER 1200 milliGRAM(s) Oral every 12 hours  hydrOXYzine hydrochloride 25 milliGRAM(s) Oral three times a day  ibuprofen  Tablet. 400 milliGRAM(s) Oral every 12 hours  lactulose Syrup 10 Gram(s) Oral daily  mupirocin 2% Ointment 1 Application(s) Topical <User Schedule>  oxyCODONE    IR 10 milliGRAM(s) Oral every 4 hours PRN  oxyCODONE  ER Tablet 30 milliGRAM(s) Oral every 12 hours  pantoprazole    Tablet 40 milliGRAM(s) Oral before breakfast  polyethylene glycol 3350 17 Gram(s) Oral daily  senna 2 Tablet(s) Oral at bedtime  sodium chloride 0.9% lock flush 10 milliLiter(s) IV Push every 1 hour PRN    Prior/Completed Antimicrobials:  piperacillin/tazobactam IVPB.  piperacillin/tazobactam IVPB.  piperacillin/tazobactam IVPB.-  piperacillin/tazobactam IVPB.-  vancomycin  IVPB  vancomycin  IVPB

## 2025-03-19 NOTE — PROGRESS NOTE ADULT - SUBJECTIVE AND OBJECTIVE BOX
Subjective: Patient seen and examined. No new events except as noted.   Pt seen at HD    REVIEW OF SYSTEMS:  unable to obtain    MEDICATIONS:  MEDICATIONS  (STANDING):  albuterol/ipratropium for Nebulization 3 milliLiter(s) Nebulizer every 6 hours  aspirin  chewable 81 milliGRAM(s) Oral daily  atorvastatin 40 milliGRAM(s) Oral at bedtime  chlorhexidine 2% Cloths 1 Application(s) Topical daily  clopidogrel Tablet 75 milliGRAM(s) Oral daily  epoetin aleah-epbx (RETACRIT) Injectable 17279 Unit(s) IV Push <User Schedule>  guaiFENesin ER 1200 milliGRAM(s) Oral every 12 hours  hydrOXYzine hydrochloride 25 milliGRAM(s) Oral three times a day  ibuprofen  Tablet. 400 milliGRAM(s) Oral every 12 hours  lactulose Syrup 10 Gram(s) Oral daily  mupirocin 2% Ointment 1 Application(s) Topical <User Schedule>  oxyCODONE  ER Tablet 30 milliGRAM(s) Oral every 12 hours  pantoprazole    Tablet 40 milliGRAM(s) Oral before breakfast  polyethylene glycol 3350 17 Gram(s) Oral daily  senna 2 Tablet(s) Oral at bedtime  trimethoprim  160 mG/sulfamethoxazole 800 mG 1 Tablet(s) Oral once      PHYSICAL EXAM:  T(C): 36.2 (03-19-25 @ 08:24), Max: 36.8 (03-18-25 @ 16:00)  HR: 84 (03-19-25 @ 08:24) (84 - 96)  BP: 150/79 (03-19-25 @ 08:24) (137/80 - 172/79)  RR: 18 (03-19-25 @ 08:24) (18 - 18)  SpO2: 96% (03-19-25 @ 08:24) (95% - 97%)  Wt(kg): --  I&O's Summary        Appearance: Normal	  HEENT:   Normal oral mucosa, PERRL, EOMI	  Lymphatic: No lymphadenopathy , no edema  Cardiovascular: Normal S1 S2, No JVD, No murmurs , Peripheral pulses palpable 2+ bilaterally  Respiratory: Lungs clear to auscultation, normal effort 	  Gastrointestinal:  Soft, Non-tender, + BS	  Skin: No rashes, No ecchymoses, No cyanosis, warm to touch  Musculoskeletal: Normal range of motion, normal strength  Psychiatry:  Mood & affect appropriate  Ext: No edema      LABS:    CARDIAC MARKERS:                  proBNP:   Lipid Profile:   HgA1c:   TSH:             TELEMETRY: 	    ECG:  	  RADIOLOGY:   DIAGNOSTIC TESTING:  [ ] Echocardiogram:  [ ]  Catheterization:  [ ] Stress Test:    OTHER:

## 2025-03-19 NOTE — PROGRESS NOTE ADULT - ASSESSMENT
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 The initial plan was that he would present to the [facility] ER Monday 2/17 for HD initiation. However, day of admission, he fell and sustained trauma to his knee. Called to consult for cough, elevated R hemidiaphragm.

## 2025-03-19 NOTE — PROGRESS NOTE ADULT - ASSESSMENT
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 He is well known to me from multiple recent admisions  s/p admissions at [facility] 12/20-12/25/24 and 2/4-2/7 with SONDRA on CKD with associated uremic symptoms.   At the last admission he had expressed strong interest to try to hold off with HD intiation but he has been getting worse clinically at home.  His SONDRA and uremic symptoms significantly improved after the first admission; they improved a to mild extent during the 2nd admission to the point where he was able to be discharged without HD. Since then, however, he has worsened further.   He has been suffering from progressively worsening diffuse pruritus, loss of appetite, and generalized weakness and falls.   His nephrologist and PCP has been speaking with him and his family over the past few days,   The initial plan was that he would present to the [facility] ER Monday 2/17 (ie tomorrow) for HD initiation. Today, however, he fell and sustained trauma to his knee. Given the fall and concern for knee fracture, he presented to the ER today. multiple xrays show no Fractures.      CKD5, uremia, requiring initiation of HD  Rash, seborrheic dermatitis  Pruritis  Anemia  PAD  SP catheter, chronic  Left inferior pole acute on chronic patella Fx    plan  - HD via R subclavian permacath  -3/19: ptn w dinh crusting around SPC , states its painful. started on po Bactrim, renally dosed by ID for cellulitis.   -3/18: LLE paraesthesias are chronic, will d/w CM re dc planning to [facility]    -3/17: ptn states he is lethargic, he has LLE numbness which is new and severe pain at SPC insertion site. this was ID, d/w neuro, renal and vascular. as per ID: no sign of an acute infection recommend CT A/P.   -3/16:  urine cx from 3/15 NTD, zosyn DCed, awaiting dc to [facility], not sure will be able to go to [facility] since there is an insurance coverage conflict. HD as per renal  - 3/15: spoke w ptn's daughter today re denial from [facility] for [facility]. ptn has [employer] medicare advantage plan, which [facility] doesnt accept. i recommended to speak  to Cm and to the insurance company to find participating facilities. ptn is stable today. pain and erythema at SP catheter site has resolved today. seen by ID, will cont ZOSYN for now. UCx sent.   - 3/14: suprapubic tube changhed by BETTY, ptn has crusting at the insertion site and pain. will start Abx, urine always has sediment, will sent for cx, start Vanco/ZOsyn. ID consult called. check MRSA/MSSA PCR  -3/13: ptn is doing well. ptn has an accepting rehab facility, now pending AUTH.   -3/12: ptn is no longer constipated. doing well off prn IV DIlaudid. awaiting dc to [facility]  -3/11: ptn has no new c/o other than feeling constipated, lactulose ordered. also in preparation for [facility] will dc prn IV DIlaudid, cont prn OXy IR  -3/10:  ptn is awake, alert, wife at bedside, i instructed them to give rehab choices. also awaiting SPC change to be done by urology. pain is controlled  -3/9: seen by PT, will refer to [facility]. choices to be given in AM. this was d/w ptn, daughter, wife.   -3/8:  ptn didnt want to get PT eval done, will reorder. ptn needs [facility] placement. PMNR consult ordered  - 3/7: ptn is alert , pain is controlled, DC planning d/w ptn and HCP, daughter Yanique  -3/6:  ptn is awake, alert, ecchymotic feet look improved, pain is controlled. DC planning to [facility]  3/4-5 - s/p b/l iliac arteries angioplasty and stent placements on 3/3. today has new ecchymoses in b/l LE, concern for arterial insufficiency. vascular aware.. wound from Left knee immobilizer is dressed and healing. pain is controlled.   LEFT UE AVF placement/scheduling will be on outptn basis as per vascular. dc planning to [facility]  - 3/3: ptn is s/p angiogram RLE : b/l iliac arteries w successful angioplasty and stents. in PACU. awaitng HD.  ptn has a pressure wound from the LLE immobilizer posteriorly proximal to the knee. its been on 2/2 knee fracture, applied by ortho and managed by ptn's wife as per ptn's and wife's request , this was d/w nursing and nurse manager ms Ruiz.. immobilizer should be managed by orthopedics and nursing. will keep it off, only keep on when repositioning for care and then remove. wound care to F/U . this was discussed at length w daughter Yanique and wife Dee Dee.   -3/1-3/2: ptn is smiling, pain free, had HD 2/28 and 3/1, awaiting RLE angiogram 3/3, on Heparin drip, euvolemic  -2/28: ptn is lethargic, awaiting RLE angiogram possible fem-fem bypass hopefully on 3/3 pending OR availability, awaiting HD today    -2/27:  plan for angiogram in am with possible angioplasty, possible stent, possible fem-fem bypass. medically cleared for angiogram and possible surgery, stent, angioplasty. pain is controlled. remains on heparin drip as per vascular. HD as per renal    -2/26:  ptn is sleeping, pain is controlled, he was seen by wound care and vascular attending. planning on angiogram 2/2 severe PAD in LE on CTA. he also spoke to HCP. ptn and HCP in agreement. ptn was started on HEPARIN drip as per vascular recs. RIJ permacath done 2/25    - 2/25: ptn states he is hallucinating, but not at present, his MS is at baseline, his pain is controlled, he still needs prn pain meds when he is moved in the bed or for testing or for HD. awaiting Permacath, AVF creation, LE bypass to be d/w Dr. Swenson and the ptn tomorrow. ptn has cardiology clearance  if he opts for. Ptn has sacral decubiti and posterior thigh and buttock decibiti and b/l heel decubiti. Z flow bootie d/w RN, get wound care consult    -2/24: pain is controlled  if the LLE is immobile and fully extended. doesn't tolerate the knee immobilizer. has a medium condyle and acute on chronic patella fx in LEFT knee. as per vascular ptn will need iliac stent  w a fem-fem bypass, possible axillo-femoral bypass. for this surgery he would need cardiac work up . more pressing surgery is LUE AVF creation,  in IR will arrange for Permacath. for pain control: Motrin 400 mg q12H, Oxy ER 30 mg q12H, Oxy IR 10 for mod pain and dilaudid 2 mg iv for severe pain. still has pruritis though improved, will raise atarax 25 mg to tid. plan of care d/w daughter Norma Persaud , ptn and his wife. Also d/w renal, card, vascular, ACP    -2/23: Daughter Yanique is the HCP, she and the ptn filled out the paperwork. daily plan and findings d/w her and the ptn. MS is at abseline, c/o b/l LE foot pain and Left Knee pain. xrays of left knee: cannot r/o acute on chronic inferior pole patellar Fx. knee immobilizer is on, awaiting ortho consult. doubt needs any intervention awaiting Ct chest today, will add CT Left knee. ptn states itching has recurred, severe pain has recurred. will resume Atatrax ( 25 mg bid) and Oxycodone ER , but at a lower dose 15 mg q12H. cont prn analgesics. HD as per renal, awaiting Vascular consult f/u re CTA A/L &LE and recs. ptn also wants AVF surgery on this admission. Coughing has stopped    - 2/22: ptn is drowsy but arousable, calmer today, answers questions appropriately. he is pain free, he stopped coughing, he denies having pruritis: will DC:  OXY ER, Atarax, Tessalon perles.     -  2/21: ptn is tearful, a bit confused, coughing, recognizes me and family at bedside. GOC d/w daughter and wife. AMS prob delirium 2/2 acute illness +/- opiods, lyrica, atarac. will lower atarak to tid, dc lyrica, cont Oxy 2/2 ptn has severe LE pain. neuro called for eval. Head ct done yesterday w no acute findings. CTA A/P w LE run fof: severe PAD, vascular to follow up on plan fo care. plan for HD tomorrow and next week place on tiw schedule of MWF. will need tunneled catheter prior to DC and will d/w vascular scheduling of AVF. ptn wants all to be done while inptn. daughter wants to be HCP as per ptn's wishes. she was given paperwork to get it signed and witnessed and will present to nursing thereafter. details of findings and complaints and plan of care d/w daughter and wife. spent 60 min. RVP ordered. start mucinex , tessalon perles, duonebs, get CT chest to r/o PNA. pulm called    -  2/20:  ptn had RRT 2/2 AMS and tremors. no SZ, no LOC. noted to have Na 123( hyponatremia), given 500 cc NS. Gabapentin DCed. ptn had been taking gabapentin at home PTA. Pain is controlled. Pruritis resolved, tolerating HD otherwise.     - Pain management:  cont Oxycodone 10 IR q6H,  DIlaudid prn severe pain 2 mg q4H prn.   - cont outptn meds  - DVT ppx w HSC

## 2025-03-19 NOTE — PROGRESS NOTE ADULT - SUBJECTIVE AND OBJECTIVE BOX
Date of Service: 03-19-25 @ 15:46    Patient is a 74y old  Male who presents with a chief complaint of ESRD requiring HD, uremia (19 Mar 2025 14:15)      Any change in ROS: seems to be doing  ok : no cough : no phlegm      MEDICATIONS  (STANDING):  albuterol/ipratropium for Nebulization 3 milliLiter(s) Nebulizer every 6 hours  aspirin  chewable 81 milliGRAM(s) Oral daily  atorvastatin 40 milliGRAM(s) Oral at bedtime  chlorhexidine 2% Cloths 1 Application(s) Topical daily  clopidogrel Tablet 75 milliGRAM(s) Oral daily  epoetin aleah-epbx (RETACRIT) Injectable 49197 Unit(s) IV Push <User Schedule>  guaiFENesin ER 1200 milliGRAM(s) Oral every 12 hours  hydrOXYzine hydrochloride 25 milliGRAM(s) Oral three times a day  ibuprofen  Tablet. 400 milliGRAM(s) Oral every 12 hours  lactulose Syrup 10 Gram(s) Oral daily  mupirocin 2% Ointment 1 Application(s) Topical <User Schedule>  oxyCODONE  ER Tablet 30 milliGRAM(s) Oral every 12 hours  pantoprazole    Tablet 40 milliGRAM(s) Oral before breakfast  polyethylene glycol 3350 17 Gram(s) Oral daily  senna 2 Tablet(s) Oral at bedtime  trimethoprim  160 mG/sulfamethoxazole 800 mG 1 Tablet(s) Oral once    MEDICATIONS  (PRN):  acetaminophen     Tablet .. 650 milliGRAM(s) Oral every 6 hours PRN Temp greater or equal to 38C (100.4F), Mild Pain (1 - 3)  benzocaine/menthol Lozenge 1 Lozenge Oral three times a day PRN Sore Throat  bisacodyl 5 milliGRAM(s) Oral every 12 hours PRN Constipation  HYDROmorphone  Injectable 2 milliGRAM(s) IV Push every 4 hours PRN Severe Pain (7 - 10)  oxyCODONE    IR 10 milliGRAM(s) Oral every 4 hours PRN Moderate Pain (4 - 6)  sodium chloride 0.9% lock flush 10 milliLiter(s) IV Push every 1 hour PRN Pre/post blood products, medications, blood draw, and to maintain line patency    Vital Signs Last 24 Hrs  T(C): 36.3 (19 Mar 2025 11:25), Max: 36.8 (18 Mar 2025 16:00)  T(F): 97.3 (19 Mar 2025 11:25), Max: 98.2 (18 Mar 2025 16:00)  HR: 96 (19 Mar 2025 11:25) (84 - 96)  BP: 123/68 (19 Mar 2025 11:25) (123/68 - 172/79)  BP(mean): --  RR: 18 (19 Mar 2025 11:25) (18 - 18)  SpO2: 97% (19 Mar 2025 11:25) (95% - 97%)    Parameters below as of 19 Mar 2025 11:25  Patient On (Oxygen Delivery Method): room air        I&O's Summary    19 Mar 2025 07:01  -  19 Mar 2025 15:46  --------------------------------------------------------  IN: 0 mL / OUT: 2600 mL / NET: -2600 mL          Physical Exam:   GENERAL: NAD, well-groomed, well-developed  HEENT: VINNY/   Atraumatic, Normocephalic  ENMT: No tonsillar erythema, exudates, or enlargement; Moist mucous membranes, Good dentition, No lesions  NECK: Supple, No JVD, Normal thyroid  CHEST/LUNG: Clear to auscultaion  CVS: Regular rate and rhythm; No murmurs, rubs, or gallops  GI: : Soft, Nontender, Nondistended; Bowel sounds present  NERVOUS SYSTEM:  Alert & Oriented X3  EXTREMITIES:  gangrenous digits:   LYMPH: No lymphadenopathy noted  SKIN: No rashes or lesions  ENDOCRINOLOGY: No Thyromegaly  PSYCH: Appropriate    Labs:                              9.2    10.14 )-----------( 203      ( 16 Mar 2025 11:40 )             31.9     03-16    135  |  98  |  28[H]  ----------------------------<  148[H]  4.2   |  23  |  3.43[H]  03-15    134[L]  |  99  |  23  ----------------------------<  161[H]  5.3   |  22  |  2.84[H]        CAPILLARY BLOOD GLUCOSE        rad< from: Xray Chest 1 View- PORTABLE-Urgent (Xray Chest 1 View- PORTABLE-Urgent .) (03.01.25 @ 22:48) >  BANIK     PROCEDURE DATE:  03/01/2025          INTERPRETATION:  EXAMINATION: XR CHEST URGENT    CLINICAL INDICATION: cough    TECHNIQUE: Single frontal, portable view of the chest was obtained.    COMPARISON: Chest x-ray 2/20/2025.    FINDINGS:  Right-sided tunneled hemodialysis catheter terminates in the SVC.  The heart is not accurately assessed in this AP projection.  The lungs are clear.  There is no pneumothorax or pleural effusion.  No acute bony abnormality.    IMPRESSION:  Clear lungs.    --- End of Report ---          GILBERT LEON MD; Resident Radiologist  This document has been electronically signed.   JESUS HALEY DO; Attending Interventional Radiologist  This document has been electronically signed. Mar  2 2025  9:37AM    < end of copied text >                RECENT CULTURES:  03-15 @ 07:08 Catheterized Catheterized                <10,000 CFU/mL Normal Urogenital Catie          RESPIRATORY CULTURES:          Studies  Chest X-RAY  CT SCAN Chest   Venous Dopplers: LE:   CT Abdomen  Others

## 2025-03-20 NOTE — PROGRESS NOTE ADULT - PROBLEM SELECTOR PLAN 1
pulm wise he remains ok ; on room air : ct chest showed: Mucoid impaction and airway associated subpleural groundglass opacities, most prominent in left lower lobe and to a lesser degree in right lower lobe. Mild bronchial wall thickening, likely inflammatory.  clinically he seems to be doing ok : no resp symptoms:  he would need repeat ct scan chest  in 6 weeks time  3/15: no new change  ; on room air;  alert and awake  3/16: no overnight resp events:   he is on room air:  lings are clear:  doing well ;pulm wise:  3/17: has some body ache today  ;   pulm wise stable:  3/18: feels constipated:  on room air:  has some pain in legs oo :  on HD:  on laxatives  3/19 ; constipation relieved:  doing  ok : no sob;  3/20: seems to be doing  ok : no sob:  remains on room ; not sob

## 2025-03-20 NOTE — PROGRESS NOTE ADULT - SUBJECTIVE AND OBJECTIVE BOX
Subjective: Patient seen and examined. No new events except as noted.   feels good  wife at bedside    REVIEW OF SYSTEMS:    CONSTITUTIONAL: + weakness, fevers or chills  EYES/ENT: No visual changes;  No vertigo or throat pain   NECK: No pain or stiffness  RESPIRATORY: No cough, wheezing, hemoptysis; No shortness of breath  CARDIOVASCULAR: No chest pain or palpitations  GASTROINTESTINAL: No abdominal or epigastric pain. No nausea, vomiting, or hematemesis; No diarrhea or constipation. No melena or hematochezia.  GENITOURINARY: No dysuria, frequency or hematuria  NEUROLOGICAL: No numbness or weakness  SKIN: No itching, burning, rashes, or lesions   All other review of systems is negative unless indicated above.    MEDICATIONS:  MEDICATIONS  (STANDING):  albuterol/ipratropium for Nebulization 3 milliLiter(s) Nebulizer every 6 hours  aspirin  chewable 81 milliGRAM(s) Oral daily  atorvastatin 40 milliGRAM(s) Oral at bedtime  chlorhexidine 2% Cloths 1 Application(s) Topical daily  clopidogrel Tablet 75 milliGRAM(s) Oral daily  epoetin aleah-epbx (RETACRIT) Injectable 91941 Unit(s) IV Push <User Schedule>  guaiFENesin ER 1200 milliGRAM(s) Oral every 12 hours  hydrOXYzine hydrochloride 25 milliGRAM(s) Oral three times a day  ibuprofen  Tablet. 400 milliGRAM(s) Oral every 12 hours  lactulose Syrup 10 Gram(s) Oral daily  mupirocin 2% Ointment 1 Application(s) Topical <User Schedule>  oxyCODONE  ER Tablet 30 milliGRAM(s) Oral every 12 hours  pantoprazole    Tablet 40 milliGRAM(s) Oral before breakfast  polyethylene glycol 3350 17 Gram(s) Oral daily  povidone iodine 10% Solution 1 Application(s) Topical two times a day  senna 2 Tablet(s) Oral at bedtime  trimethoprim   80 mG/sulfamethoxazole 400 mG 1 Tablet(s) Oral two times a day  trimethoprim  160 mG/sulfamethoxazole 800 mG 1 Tablet(s) Oral once      PHYSICAL EXAM:  T(C): 36.8 (03-20-25 @ 08:37), Max: 36.8 (03-20-25 @ 08:37)  HR: 85 (03-20-25 @ 08:37) (85 - 96)  BP: 120/67 (03-20-25 @ 08:37) (120/67 - 148/89)  RR: 18 (03-20-25 @ 08:37) (18 - 18)  SpO2: 99% (03-20-25 @ 08:37) (95% - 99%)  Wt(kg): --  I&O's Summary    19 Mar 2025 07:01  -  20 Mar 2025 07:00  --------------------------------------------------------  IN: 300 mL / OUT: 2600 mL / NET: -2300 mL          Appearance: NAD  HEENT:  Dry oral mucosa, PERRL, EOMI	  Lymphatic: No lymphadenopathy  Cardiovascular: Normal S1 S2, No JVD, No murmurs, No edema  Respiratory: Lungs clear to auscultation	  Psychiatry: A & O x 3, Mood & affect appropriate  Skin: No rashes, No ecchymoses, No cyanosis	  Neurologic: Non-focal  GI/Abd: Soft, obese, nontender. Dressing to Q C/D/I. Suprapubic catheter in place  Ext: Palp femoral pulses bilat. BLE atrophic, minimal dorsi/plantarflexion bilaterally. Sensation grossly intact. LLE edematous compared to R, erythema to right toes/forefoot. mottling of right toes/forefoot/heel  LLE:  small superficial abrasion, +edema and +ecchymosis over L knee  +TTP over L knee, no TTP along remainder of extremity; compartments soft  Limited ROM at knee 2/2 pain  No gross varus/valgus laxity, but assessment limited 2/2 pain  Motor: TA/EHL/GS/FHL intact  Sensory: DP/SP/Tib/Jordan/Saph SILT  +DP pulse (symmetric relative to contralateral side), WWP   pressure wound from the LLE immobilizer posteriorly proximal to the knee.  Vascular: Peripheral pulses palpable 2+ bilaterally      LABS:    CARDIAC MARKERS:                  proBNP:   Lipid Profile:   HgA1c:   TSH:             TELEMETRY: 	    ECG:  	  RADIOLOGY:   DIAGNOSTIC TESTING:  [ ] Echocardiogram:  [ ]  Catheterization:  [ ] Stress Test:    OTHER:

## 2025-03-20 NOTE — PROGRESS NOTE ADULT - ASSESSMENT
74M presents with b/l ischemic changes to digits  - Patient seen and evaluated  - Afebrile, no leukocytosis   - b/l digits 1-5 distal tuft dry gangrene with increased ischemic/ dusky changes proximal, decreased warmth, No open ulcerations, no purulence, no fluctuance, no malodor, or clinical signs of infection., left posterior heel well adhered eschar secondary to PVD, no signs of infection noted  - Vascular recs, appreciated  - Wound care orders placed   - Rec z flow boots to offload heels at all time   - No acute podiatric surgical intervention at this time, local wound care only   - Wound care instruction and follow up information in discharge note provider.   - Discussed with attending

## 2025-03-20 NOTE — PROGRESS NOTE ADULT - SUBJECTIVE AND OBJECTIVE BOX
Date of Service: 03-20-25 @ 14:18    Patient is a 74y old  Male who presents with a chief complaint of ESRD requiring HD, uremia (20 Mar 2025 10:38)      Any change in ROS: seems to be doing ok :  nos ob:  remains on room air:  has back pain     MEDICATIONS  (STANDING):  albuterol/ipratropium for Nebulization 3 milliLiter(s) Nebulizer every 6 hours  aspirin  chewable 81 milliGRAM(s) Oral daily  atorvastatin 40 milliGRAM(s) Oral at bedtime  chlorhexidine 2% Cloths 1 Application(s) Topical daily  clopidogrel Tablet 75 milliGRAM(s) Oral daily  epoetin aleah-epbx (RETACRIT) Injectable 12301 Unit(s) IV Push <User Schedule>  guaiFENesin ER 1200 milliGRAM(s) Oral every 12 hours  hydrOXYzine hydrochloride 25 milliGRAM(s) Oral three times a day  ibuprofen  Tablet. 400 milliGRAM(s) Oral every 12 hours  mupirocin 2% Ointment 1 Application(s) Topical <User Schedule>  oxyCODONE  ER Tablet 30 milliGRAM(s) Oral every 12 hours  pantoprazole    Tablet 40 milliGRAM(s) Oral before breakfast  polyethylene glycol 3350 17 Gram(s) Oral daily  povidone iodine 10% Solution 1 Application(s) Topical two times a day  senna 2 Tablet(s) Oral at bedtime  trimethoprim   80 mG/sulfamethoxazole 400 mG 1 Tablet(s) Oral two times a day  trimethoprim  160 mG/sulfamethoxazole 800 mG 1 Tablet(s) Oral once    MEDICATIONS  (PRN):  acetaminophen     Tablet .. 650 milliGRAM(s) Oral every 6 hours PRN Temp greater or equal to 38C (100.4F), Mild Pain (1 - 3)  benzocaine/menthol Lozenge 1 Lozenge Oral three times a day PRN Sore Throat  bisacodyl 5 milliGRAM(s) Oral every 12 hours PRN Constipation  HYDROmorphone  Injectable 2 milliGRAM(s) IV Push every 4 hours PRN Severe Pain (7 - 10)  oxyCODONE    IR 10 milliGRAM(s) Oral every 4 hours PRN Moderate Pain (4 - 6)  sodium chloride 0.9% lock flush 10 milliLiter(s) IV Push every 1 hour PRN Pre/post blood products, medications, blood draw, and to maintain line patency    Vital Signs Last 24 Hrs  T(C): 36.8 (20 Mar 2025 08:37), Max: 36.8 (20 Mar 2025 08:37)  T(F): 98.2 (20 Mar 2025 08:37), Max: 98.2 (20 Mar 2025 08:37)  HR: 85 (20 Mar 2025 08:37) (85 - 94)  BP: 120/67 (20 Mar 2025 08:37) (120/67 - 148/89)  BP(mean): --  RR: 18 (20 Mar 2025 08:37) (18 - 18)  SpO2: 99% (20 Mar 2025 08:37) (95% - 99%)    Parameters below as of 20 Mar 2025 08:37  Patient On (Oxygen Delivery Method): room air        I&O's Summary    19 Mar 2025 07:01  -  20 Mar 2025 07:00  --------------------------------------------------------  IN: 300 mL / OUT: 2600 mL / NET: -2300 mL          Physical Exam:   GENERAL: NAD, well-groomed, well-developed  HEENT: VINNY/   Atraumatic, Normocephalic  ENMT: No tonsillar erythema, exudates, or enlargement; Moist mucous membranes, Good dentition, No lesions  NECK: Supple, No JVD, Normal thyroid  CHEST/LUNG: Clear to auscultaion  CVS: Regular rate and rhythm; No murmurs, rubs, or gallops  GI: : Soft, Nontender, Nondistended; Bowel sounds present  NERVOUS SYSTEM:  Alert & Oriented X3  EXTREMITIES: gangrenous toes:   LYMPH: No lymphadenopathy noted  SKIN: No rashes or lesions  ENDOCRINOLOGY: No Thyromegaly  PSYCH: Appropriate    Labs:                CAPILLARY BLOOD GLUCOSE          rad< from: Xray Chest 1 View- PORTABLE-Urgent (Xray Chest 1 View- PORTABLE-Urgent .) (03.01.25 @ 22:48) >    TECHNIQUE: Single frontal, portable view of the chest was obtained.    COMPARISON: Chest x-ray 2/20/2025.    FINDINGS:  Right-sided tunneled hemodialysis catheter terminates in the SVC.  The heart is not accurately assessed in this AP projection.  The lungs are clear.  There is no pneumothorax or pleural effusion.  No acute bony abnormality.    IMPRESSION:  Clear lungs.    --- End of Report ---          GILBERT LEON MD; Resident Radiologist  This document has been electronically signed.   JESUS HALEY DO; Attending Interventional Radiologist  This document has been electronically signed. Mar  2 2025  9:37AM    < end of copied text >              RECENT CULTURES:  03-15 @ 07:08 Catheterized Catheterized                <10,000 CFU/mL Normal Urogenital Catie          RESPIRATORY CULTURES:          Studies  Chest X-RAY  CT SCAN Chest   Venous Dopplers: LE:   CT Abdomen  Others

## 2025-03-20 NOTE — PROGRESS NOTE ADULT - PROBLEM SELECTOR PLAN 6
-Monitor leukocytosis/fever curve  -CXR as above, no clear infiltrate  -ID f/u.  3/9: WBC slowly downtrending  3/10: con tto downtrend  3/11: wbc is slightly high o fver;  3/12: wbc increased further   no fever:  3/13: WBC spontaneously downtrended today  3/14: wbc slightly high ; no fever;  3/15; started on zosyn  3/16: urine culture is negative:  antibiotics dced by ID  3/17: wbc DOWNTRENDED  3/18: no new WBC now:  3/20; now on bactrim po

## 2025-03-20 NOTE — PROGRESS NOTE ADULT - PROBLEM SELECTOR PLAN 3
-Neuro following  -ABG 2/20 acceptable  -Pt lethargic 2/22, ABG never sent but AMS appears to be improving   -ABG 2/28 with no co2 retention.  3/18: resolved  3/19; doing pretty good:  3/20: resolved

## 2025-03-20 NOTE — PROGRESS NOTE ADULT - ASSESSMENT

## 2025-03-20 NOTE — PROGRESS NOTE ADULT - PROBLEM SELECTOR PLAN 4
-Per vascular  -S/p b/l iliac stent placement on 3/3/25  -Podiatry f/u.  3/9: has cyanosed digits:  defer to vascular  /: vascular following  3/18: digits remains cyanosed and tip gangrenous:  defer to primary team  3/20: vascular following

## 2025-03-20 NOTE — PROGRESS NOTE ADULT - SUBJECTIVE AND OBJECTIVE BOX
Tolerated net 2.3L UF with HD yesterday      VITAL:  T(C): , Max: 36.6 (03-20-25 @ 00:51)  T(F): , Max: 97.8 (03-20-25 @ 00:51)  HR: 88 (03-20-25 @ 00:51)  BP: 126/71 (03-20-25 @ 00:51)  BP(mean): --  RR: 18 (03-20-25 @ 00:51)  SpO2: 95% (03-20-25 @ 00:51)      PHYSICAL EXAM:  Constitutional: alert, NAD, in good spirits  HEENT: NCAT, DMM  Neck: Supple, No JVD  Respiratory: CTA-b/l  Cardiovascular: RRR s1s2, no m/r/g  Gastrointestinal: BS+, soft, NT/ND  : (+)suprapubic cath  Extremities: 2+ b/l LE edema  Neurological: reduced generalized strength  Back: no CVAT b/l  Skin: (+)cyanotic changes b/l feet  Access: RIJ tunneled cath        IMPRESSION: 74M w/ polio, neurogenic bladder/suprapubic catheter, 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 Last dialyzed yesterday; due for next HD tomorrow    (2)Anemia - s/p IV iron; on Retacrit with HD    (3)PAD - s/p LE bypass 3/3/25 - cyanotic changes of toes b/l - for further management after discharge    (4)CV - multifactorial LE edema; improving with increasing intradialytic UF, and with improving nutritional parameters    (5)Neuro - reduced generalized strength - getting PT/awaiting NYDIA        RECOMMEND:   (1)Prosouce/high-protein diet  (2)Net HD tomorrow - 1.5kg UF as able          Rafita Denny MD  [facility]  Office/on call physician: [phone]  Cell (7a-7p): [phone]       Tolerated net 2.3L UF with HD yesterday  (+)L foot numbness  Waxing/waning energy    VITAL:  T(C): , Max: 36.6 (03-20-25 @ 00:51)  T(F): , Max: 97.8 (03-20-25 @ 00:51)  HR: 88 (03-20-25 @ 00:51)  BP: 126/71 (03-20-25 @ 00:51)  BP(mean): --  RR: 18 (03-20-25 @ 00:51)  SpO2: 95% (03-20-25 @ 00:51)      PHYSICAL EXAM:  Constitutional: alert, NAD  HEENT: NCAT, DMM  Neck: Supple, No JVD  Respiratory: CTA-b/l  Cardiovascular: RRR s1s2, no m/r/g  Gastrointestinal: BS+, soft, NT/ND  : (+)suprapubic cath  Extremities: 2+ b/l LE edema  Neurological: reduced generalized strength  Back: no CVAT b/l  Skin: (+)cyanotic changes b/l feet  Access: RIJ tunneled cath        IMPRESSION: 74M w/ polio, neurogenic bladder/suprapubic catheter, 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 Last dialyzed yesterday; due for next HD tomorrow    (2)Anemia - s/p IV iron; on Retacrit with HD    (3)PAD - s/p LE bypass 3/3/25 - cyanotic changes of toes b/l - for further management after discharge    (4)CV - multifactorial LE edema; improving with increasing intradialytic UF, and with improving nutritional parameters    (5)Neuro - reduced generalized strength - getting PT/awaiting NYDIA        RECOMMEND:   (1)Prosouce/high-protein diet  (2)Net HD tomorrow - 1.5kg UF as able          Rafita Denny MD  [facility]  Office/on call physician: [phone]  Cell (7a-7p): [phone]

## 2025-03-20 NOTE — PROGRESS NOTE ADULT - SUBJECTIVE AND OBJECTIVE BOX
Patient is a 74y old  Male who presents with a chief complaint of ESRD requiring HD, uremia (20 Mar 2025 14:18)       INTERVAL HPI/OVERNIGHT EVENTS:  Patient seen and evaluated at bedside.  Pt is resting comfortable in NAD. Denies N/V/F/C.    Allergies    No Known Allergies    Intolerances        Vital Signs Last 24 Hrs  T(C): 36.8 (20 Mar 2025 08:37), Max: 36.8 (20 Mar 2025 08:37)  T(F): 98.2 (20 Mar 2025 08:37), Max: 98.2 (20 Mar 2025 08:37)  HR: 85 (20 Mar 2025 08:37) (85 - 94)  BP: 120/67 (20 Mar 2025 08:37) (120/67 - 148/89)  BP(mean): --  RR: 18 (20 Mar 2025 08:37) (18 - 18)  SpO2: 99% (20 Mar 2025 08:37) (95% - 99%)    Parameters below as of 20 Mar 2025 08:37  Patient On (Oxygen Delivery Method): room air        LABS:              CAPILLARY BLOOD GLUCOSE          Lower Extremity Physical Exam:  Vasular: DP/PT 0/4, B/L, CFT <4 seconds B/L, Temperature gradient  warm to cool, B/L.   Neuro: Epicritic sensation diminished to the level of digits B/L.  Skin: b/l digits 1-5 distal tuft dry gangrene with increased ischemic/ dusky changes proximal, decreased warmth, No open ulcerations, no purulence, no fluctuance, no malodor, or clinical signs of infection., left posterior heel well adhered eschar secondary to PVD, no signs of infection noted    RADIOLOGY & ADDITIONAL TESTS:

## 2025-03-20 NOTE — PROGRESS NOTE ADULT - SUBJECTIVE AND OBJECTIVE BOX
SUBJECTIVE: ongoing low back pain, LLE pain, though overall has improved      MEDICATIONS  (STANDING):  albuterol/ipratropium for Nebulization 3 milliLiter(s) Nebulizer every 6 hours  aspirin  chewable 81 milliGRAM(s) Oral daily  atorvastatin 40 milliGRAM(s) Oral at bedtime  chlorhexidine 2% Cloths 1 Application(s) Topical daily  clopidogrel Tablet 75 milliGRAM(s) Oral daily  epoetin aleah-epbx (RETACRIT) Injectable 21830 Unit(s) IV Push <User Schedule>  guaiFENesin ER 1200 milliGRAM(s) Oral every 12 hours  hydrOXYzine hydrochloride 25 milliGRAM(s) Oral three times a day  ibuprofen  Tablet. 400 milliGRAM(s) Oral every 12 hours  lactulose Syrup 10 Gram(s) Oral daily  mupirocin 2% Ointment 1 Application(s) Topical <User Schedule>  oxyCODONE  ER Tablet 30 milliGRAM(s) Oral every 12 hours  pantoprazole    Tablet 40 milliGRAM(s) Oral before breakfast  polyethylene glycol 3350 17 Gram(s) Oral daily  povidone iodine 10% Solution 1 Application(s) Topical two times a day  senna 2 Tablet(s) Oral at bedtime  trimethoprim   80 mG/sulfamethoxazole 400 mG 1 Tablet(s) Oral two times a day  trimethoprim  160 mG/sulfamethoxazole 800 mG 1 Tablet(s) Oral once      PHYSICAL EXAM:  T(C): 36.8 (03-20-25 @ 08:37), Max: 36.8 (03-20-25 @ 08:37)  HR: 85 (03-20-25 @ 08:37) (85 - 96)  BP: 120/67 (03-20-25 @ 08:37) (120/67 - 148/89)  RR: 18 (03-20-25 @ 08:37) (18 - 18)  SpO2: 99% (03-20-25 @ 08:37) (95% - 99%)      Appearance: NAD  HEENT:  Dry oral mucosa, PERRL, EOMI	  Lymphatic: No lymphadenopathy  Cardiovascular: Normal S1 S2, No JVD, No murmurs, No edema  Respiratory: Lungs clear to auscultation	  Psychiatry: A & O x 3, Mood & affect appropriate  Skin: No rashes, No ecchymoses, No cyanosis	  Neurologic: Non-focal  GI/Abd: Soft, obese, nontender. Dressing to [facility] C/D/I. Suprapubic catheter in place  Ext: Palp femoral pulses bilat. BLE atrophic, minimal dorsi/plantarflexion bilaterally. Sensation grossly intact. LLE edematous compared to R, erythema to right toes/forefoot. mottling of right toes/forefoot/heel  LLE:  small superficial abrasion, +edema and +ecchymosis over L knee  +TTP over L knee, no TTP along remainder of extremity; compartments soft  Limited ROM at knee 2/2 pain  No gross varus/valgus laxity, but assessment limited 2/2 pain  Motor: TA/EHL/GS/FHL intact  Sensory: DP/SP/Tib/Jordan/Saph SILT  +DP pulse (symmetric relative to contralateral side), WWP   pressure wound from the LLE immobilizer posteriorly proximal to the knee.  Vascular: Peripheral pulses palpable 2+ bilaterally

## 2025-03-20 NOTE — PROGRESS NOTE ADULT - ASSESSMENT
74 year old male with CKD, sp polio, paraplegia with associated neurogenic bladder s/p suprapubic catheter who was admitted s/p fall on 2/16.   CKD - ESRD, now s/p DEYA [place] 2/17, on HD  ruled out cellulitis around suprapubic cath  2/20 s/p RRT for tremors and confusion -- pt with chronic tremors although notably worse  mrsa negative   2/20 CXR with clear lungs   SPC site with chronic/stable inflammatory changes, no drainage - no sign of SSTI  CTA abd with occlusion of b/l external iliac arteries and b/l superficial femoral arteries with distal reconstitution at popliteal arteries; patent three vessel runoff of RLE with 2 vessel runoff of LLE with occlusion of L mid anterior tibial artery with distal reconstitution  Bcx negative to date   mental status at baseline  Vascular following for worsening PAD with worsening ischemic changes   -3/3 s/p RLE angiogram b/l iliac artery stents     3/16 no tenderness at suprapubic catheter site  no visible erythema at catheter site on exam  UA w/ many epithelial cells, urine culture negative  s/p zosyn 3/14-3/16  SPC site remains without erythema but now noted with some tan crusting on dressing  abd pain less but still represent, will treat for possible SPC site infection  remains afebrile, pain at SPC site, back and legs feel less today      Recommendations:   Continue Bactrim SS 1 tab PO Q12h (renally dosed) to finish 7d on 3/25  If back pain worsens, check MRI sacrum to eval for OM, if unable to then check CT  HD per renal    Monitor temps/WBC  Wound care following   Continue rest of care per primary team       Bridgette Ramirez M.D.  [facility]  Available on [employer] TEAMS - *PREFERRED*  [phone]  After 5pm on weekdays and all day on weekends - please call [phone]     Thank you for consulting us and involving us in the management of this patients case. In addition to reviewing history, imaging, documents, labs, microbiology, took into account antibiotic stewardship, local antibiogram and infection control strategies and potential transmission issues.

## 2025-03-20 NOTE — PROGRESS NOTE ADULT - SUBJECTIVE AND OBJECTIVE BOX
[facility]  SABINO Griffin Y. Patel, S. Shah, G. Julio César  [phone]  ([phone] - weekdays after 5pm and weekends)    Name: BRIAN DEMPSEY  Age/Gender: 74y Male  MRN: [id number]    Interval History:  Patient seen and examined this morning.   States pain near SPC site, back and leg pain much better.  Denies fever, chills or any other complaints.   Notes reviewed  No concerning overnight events  Afebrile   Allergies: No Known Allergies      Objective:  Vitals:   T(F): 98.2 (03-20-25 @ 08:37), Max: 98.2 (03-20-25 @ 08:37)  HR: 85 (03-20-25 @ 08:37) (85 - 94)  BP: 120/67 (03-20-25 @ 08:37) (120/67 - 148/89)  RR: 18 (03-20-25 @ 08:37) (18 - 18)  SpO2: 99% (03-20-25 @ 08:37) (95% - 99%)  Physical Examination:  General: no acute distress  HEENT: normocephalic, atraumatic  Respiratory: breathing comfortably  Cardiovascular: S1 and S2 present  Gastrointestinal: NT, ND, SPC site with dressing  Extremities: ischemic changes LE   Skin: no visible rash    Laboratory Studies:  CBC:   WBC Trend:  10.14 03-16-25 @ 11:40  11.98 03-15-25 @ 10:30  13.26 03-14-25 @ 10:04    CMP:   Creatinine: 3.43 mg/dL (03-16-25 @ 11:40)  Creatinine: 2.84 mg/dL (03-15-25 @ 19:01)    Microbiology: reviewed   Culture - Urine (collected 03-15-25 @ 07:08)  Source: Catheterized Catheterized  Final Report (03-16-25 @ 08:38):    <10,000 CFU/mL Normal Urogenital Catie    Radiology: reviewed     Medications:  acetaminophen     Tablet .. 650 milliGRAM(s) Oral every 6 hours PRN  albuterol/ipratropium for Nebulization 3 milliLiter(s) Nebulizer every 6 hours  aspirin  chewable 81 milliGRAM(s) Oral daily  atorvastatin 40 milliGRAM(s) Oral at bedtime  benzocaine/menthol Lozenge 1 Lozenge Oral three times a day PRN  bisacodyl 5 milliGRAM(s) Oral every 12 hours PRN  chlorhexidine 2% Cloths 1 Application(s) Topical daily  clopidogrel Tablet 75 milliGRAM(s) Oral daily  epoetin aleah-epbx (RETACRIT) Injectable 79710 Unit(s) IV Push <User Schedule>  guaiFENesin ER 1200 milliGRAM(s) Oral every 12 hours  HYDROmorphone  Injectable 2 milliGRAM(s) IV Push every 4 hours PRN  hydrOXYzine hydrochloride 25 milliGRAM(s) Oral three times a day  ibuprofen  Tablet. 400 milliGRAM(s) Oral every 12 hours  lactulose Syrup 10 Gram(s) Oral daily  mupirocin 2% Ointment 1 Application(s) Topical <User Schedule>  oxyCODONE    IR 10 milliGRAM(s) Oral every 4 hours PRN  oxyCODONE  ER Tablet 30 milliGRAM(s) Oral every 12 hours  pantoprazole    Tablet 40 milliGRAM(s) Oral before breakfast  polyethylene glycol 3350 17 Gram(s) Oral daily  povidone iodine 10% Solution 1 Application(s) Topical two times a day  senna 2 Tablet(s) Oral at bedtime  sodium chloride 0.9% lock flush 10 milliLiter(s) IV Push every 1 hour PRN  trimethoprim   80 mG/sulfamethoxazole 400 mG 1 Tablet(s) Oral two times a day  trimethoprim  160 mG/sulfamethoxazole 800 mG 1 Tablet(s) Oral once    Current Antimicrobials:  trimethoprim   80 mG/sulfamethoxazole 400 mG 1 Tablet(s) Oral two times a day  trimethoprim  160 mG/sulfamethoxazole 800 mG 1 Tablet(s) Oral once    Prior/Completed Antimicrobials:  piperacillin/tazobactam IVPB.  piperacillin/tazobactam IVPB.  piperacillin/tazobactam IVPB.-  piperacillin/tazobactam IVPB.-  vancomycin  IVPB  vancomycin  IVPB

## 2025-03-20 NOTE — PROGRESS NOTE ADULT - ASSESSMENT
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 The initial plan was that he would present to the [facility] ER Monday 2/17 for HD initiation. However, day of admission, he fell and sustained trauma to his knee. Called to consult for cough, elevated R hemidiaphragm.

## 2025-03-21 NOTE — PROGRESS NOTE ADULT - ASSESSMENT
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 The initial plan was that he would present to the [facility] ER Monday 2/17 for HD initiation. However, day of admission, he fell and sustained trauma to his knee. Called to consult for cough, elevated R hemidiaphragm.

## 2025-03-21 NOTE — PROGRESS NOTE ADULT - PROBLEM SELECTOR PLAN 3
-Neuro following  -ABG 2/20 acceptable  -Pt lethargic 2/22, ABG never sent but AMS appears to be improving   -ABG 2/28 with no co2 retention.    3/21: now he is totally normal

## 2025-03-21 NOTE — PROGRESS NOTE ADULT - SUBJECTIVE AND OBJECTIVE BOX
Date of Service: 03-21-25 @ 14:36    Patient is a 74y old  Male who presents with a chief complaint of ESRD requiring HD, uremia (21 Mar 2025 13:36)      Any change in ROS: seems O K: pulm wise:   but concern is he is always lying in bed:  does not move much      MEDICATIONS  (STANDING):  albuterol/ipratropium for Nebulization 3 milliLiter(s) Nebulizer every 6 hours  aspirin  chewable 81 milliGRAM(s) Oral daily  atorvastatin 40 milliGRAM(s) Oral at bedtime  chlorhexidine 2% Cloths 1 Application(s) Topical daily  clopidogrel Tablet 75 milliGRAM(s) Oral daily  epoetin aleah-epbx (RETACRIT) Injectable 10978 Unit(s) IV Push <User Schedule>  guaiFENesin ER 1200 milliGRAM(s) Oral every 12 hours  hydrOXYzine hydrochloride 25 milliGRAM(s) Oral three times a day  ibuprofen  Tablet. 400 milliGRAM(s) Oral every 12 hours  mupirocin 2% Ointment 1 Application(s) Topical <User Schedule>  oxyCODONE  ER Tablet 30 milliGRAM(s) Oral every 12 hours  pantoprazole    Tablet 40 milliGRAM(s) Oral before breakfast  polyethylene glycol 3350 17 Gram(s) Oral daily  povidone iodine 10% Solution 1 Application(s) Topical two times a day  senna 2 Tablet(s) Oral at bedtime  trimethoprim   80 mG/sulfamethoxazole 400 mG 1 Tablet(s) Oral two times a day    MEDICATIONS  (PRN):  acetaminophen     Tablet .. 650 milliGRAM(s) Oral every 6 hours PRN Temp greater or equal to 38C (100.4F), Mild Pain (1 - 3)  benzocaine/menthol Lozenge 1 Lozenge Oral three times a day PRN Sore Throat  bisacodyl 5 milliGRAM(s) Oral every 12 hours PRN Constipation  HYDROmorphone  Injectable 2 milliGRAM(s) IV Push every 4 hours PRN Severe Pain (7 - 10)  oxyCODONE    IR 10 milliGRAM(s) Oral every 4 hours PRN Moderate Pain (4 - 6)  sodium chloride 0.9% lock flush 10 milliLiter(s) IV Push every 1 hour PRN Pre/post blood products, medications, blood draw, and to maintain line patency    Vital Signs Last 24 Hrs  T(C): 36.1 (21 Mar 2025 10:24), Max: 36.8 (20 Mar 2025 16:04)  T(F): 97 (21 Mar 2025 10:24), Max: 98.3 (20 Mar 2025 16:04)  HR: 81 (21 Mar 2025 10:24) (81 - 102)  BP: 115/55 (21 Mar 2025 10:24) (115/55 - 148/71)  BP(mean): --  RR: 18 (21 Mar 2025 10:24) (18 - 18)  SpO2: 98% (21 Mar 2025 10:24) (94% - 98%)    Parameters below as of 21 Mar 2025 10:24  Patient On (Oxygen Delivery Method): room air        I&O's Summary    21 Mar 2025 07:01  -  21 Mar 2025 14:36  --------------------------------------------------------  IN: 0 mL / OUT: 1500 mL / NET: -1500 mL          Physical Exam:   GENERAL: NAD, well-groomed, well-developed  HEENT: VINNY/   Atraumatic, Normocephalic  ENMT: No tonsillar erythema, exudates, or enlargement; Moist mucous membranes, Good dentition, No lesions  NECK: Supple, No JVD, Normal thyroid  CHEST/LUNG: Clear to auscultaion  CVS: Regular rate and rhythm; No murmurs, rubs, or gallops  GI: : Soft, Nontender, Nondistended; Bowel sounds present  NERVOUS SYSTEM:  Alert & Oriented X3  EXTREMITIES:  digits same  LYMPH: No lymphadenopathy noted  SKIN: No rashes or lesions  ENDOCRINOLOGY: No Thyromegaly  PSYCH: Appropriate    Labs:                CAPILLARY BLOOD GLUCOSE                      RECENT CULTURES:  03-15 @ 07:08 Catheterized Catheterized         rad< from: Xray Chest 1 View- PORTABLE-Urgent (Xray Chest 1 View- PORTABLE-Urgent .) (03.01.25 @ 22:48) >    ACC: [id number] EXAM:  XR CHEST PORTABLE URGENT 1V   ORDERED BY: DELPHINE BARNETT     PROCEDURE DATE:  03/01/2025          INTERPRETATION:  EXAMINATION: XR CHEST URGENT    CLINICAL INDICATION: cough    TECHNIQUE: Single frontal, portable view of the chest was obtained.    COMPARISON: Chest x-ray 2/20/2025.    FINDINGS:  Right-sided tunneled hemodialysis catheter terminates in the SVC.  The heart is not accurately assessed in this AP projection.  The lungs are clear.  There is no pneumothorax or pleural effusion.  No acute bony abnormality.    IMPRESSION:  Clear lungs.    --- End of Report ---          GILBERT LEON MD; Resident Radiologist  This document has been electronically signed.   JESUS HALEY DO; Attending Interventional Radiologist  This document has been electronically signed. Mar  2 2025  9:37AM    < end of copied text >         <10,000 CFU/mL Normal Urogenital Catie          RESPIRATORY CULTURES:          Studies  Chest X-RAY  CT SCAN Chest   Venous Dopplers: LE:   CT Abdomen  Others

## 2025-03-21 NOTE — PROGRESS NOTE ADULT - SUBJECTIVE AND OBJECTIVE BOX
Neurology      S; patient seen. no neuro changes ; seen in HD today; saw wife in patients room       Medications: MEDICATIONS  (STANDING):  albuterol/ipratropium for Nebulization 3 milliLiter(s) Nebulizer every 6 hours  aspirin  chewable 81 milliGRAM(s) Oral daily  atorvastatin 40 milliGRAM(s) Oral at bedtime  chlorhexidine 2% Cloths 1 Application(s) Topical daily  clopidogrel Tablet 75 milliGRAM(s) Oral daily  epoetin aleah-epbx (RETACRIT) Injectable 56525 Unit(s) IV Push <User Schedule>  guaiFENesin ER 1200 milliGRAM(s) Oral every 12 hours  hydrOXYzine hydrochloride 25 milliGRAM(s) Oral three times a day  ibuprofen  Tablet. 400 milliGRAM(s) Oral every 12 hours  mupirocin 2% Ointment 1 Application(s) Topical <User Schedule>  oxyCODONE  ER Tablet 30 milliGRAM(s) Oral every 12 hours  pantoprazole    Tablet 40 milliGRAM(s) Oral before breakfast  polyethylene glycol 3350 17 Gram(s) Oral daily  povidone iodine 10% Solution 1 Application(s) Topical two times a day  senna 2 Tablet(s) Oral at bedtime  trimethoprim   80 mG/sulfamethoxazole 400 mG 1 Tablet(s) Oral two times a day  trimethoprim  160 mG/sulfamethoxazole 800 mG 1 Tablet(s) Oral once    MEDICATIONS  (PRN):  acetaminophen     Tablet .. 650 milliGRAM(s) Oral every 6 hours PRN Temp greater or equal to 38C (100.4F), Mild Pain (1 - 3)  benzocaine/menthol Lozenge 1 Lozenge Oral three times a day PRN Sore Throat  bisacodyl 5 milliGRAM(s) Oral every 12 hours PRN Constipation  HYDROmorphone  Injectable 2 milliGRAM(s) IV Push every 4 hours PRN Severe Pain (7 - 10)  oxyCODONE    IR 10 milliGRAM(s) Oral every 4 hours PRN Moderate Pain (4 - 6)  sodium chloride 0.9% lock flush 10 milliLiter(s) IV Push every 1 hour PRN Pre/post blood products, medications, blood draw, and to maintain line patency       Vitals:  Vital Signs Last 24 Hrs  T(C): 36.3 (21 Mar 2025 07:23), Max: 36.8 (20 Mar 2025 16:04)  T(F): 97.3 (21 Mar 2025 07:23), Max: 98.3 (20 Mar 2025 16:04)  HR: 81 (21 Mar 2025 07:23) (81 - 102)  BP: 137/69 (21 Mar 2025 07:23) (124/79 - 148/71)  BP(mean): --  RR: 18 (21 Mar 2025 07:23) (18 - 18)  SpO2: 97% (21 Mar 2025 07:23) (94% - 98%)    Parameters below as of 21 Mar 2025 07:23  Patient On (Oxygen Delivery Method): room air              General Appearance: Appropriately dressed and in no acute distress       Head: Normocephalic, atraumatic and no dysmorphic features  Ear, Nose, and Throat: Moist mucous membranes  CVS: S1S2+  Resp: No SOB, no wheeze or rhonchi  GI: soft NT/ND  Extremities: LE PVD : dusky toes noted   Skin: + decub      Neurological Exam:  Mental Status: Awake, alert and oriented x 2.  Able to follow simple and complex verbal commands. Able to name and repeat. fluent speech. No obvious aphasia or dysarthria noted.   Cranial Nerves: PERRL, EOMI, VFFC, sensation V1-V3 intact,  no obvious facial asymmetry, equal elevation of palate, scm/trap 5/5, tongue is midline on protrusion. no obvious papilledema on fundoscopic exam. hearing is grossly intact.   Motor: Normal bulk, tone and strength throughout uppers 4/ lowers 0-/1 5   Sensation: Intact to light touch and pinprick throughout.     Coordination: No dysmetria on FNF   Gait: deferred, wheelchair bound     Data/Labs/Imaging which I personally reviewed.         LABS:    no new labs                   < from: CT Head No Cont (02.20.25 @ 18:47) >    ACC: [id number] EXAM:  CT BRAIN   ORDERED BY: GUY DE LA CRUZ     PROCEDURE DATE:  02/20/2025          INTERPRETATION:  CLINICAL INDICATION: Altered mental status    TECHNIQUE: Axial CT scanning of the brain was obtained from the skull   base to the vertex without the administration of intravenous contrast.   Reformatted coronal and sagittal images were subsequently obtained and   reviewed.    COMPARISON: None    FINDINGS:  There is no CT evidence of acute transcortical infarct. Age-related   involutional changes and chronic microvascular ischemic changes.    There is no hydrocephalus, mass effect, or acute intracranial hemorrhage.   No extra-axial collection. Basal cisterns are patent.    The visualized paranasal sinuses and mastoid air cells are clear.    The calvarium is intact.    IMPRESSION:  No evidence of acute transcortical infarct, acute intracranial   hemorrhage, or mass effect.    --- End of Report ---            CORTNEY REARDON MD; Attending Radiologist  This document has been electronically signed. Feb 20 2025  7:07PM    < end of copied text >

## 2025-03-21 NOTE — PROGRESS NOTE ADULT - SUBJECTIVE AND OBJECTIVE BOX
Tolerated HD today with net 1.5L UF as ordered  Admits to suprapubic discomfort    VITAL:  T(C): , Max: 36.8 (03-20-25 @ 16:04)  T(F): , Max: 98.3 (03-20-25 @ 16:04)  HR: 81 (03-21-25 @ 10:24)  BP: 115/55 (03-21-25 @ 10:24)  RR: 18 (03-21-25 @ 10:24)  SpO2: 98% (03-21-25 @ 10:24)  Wt(kg): --      PHYSICAL EXAM:  Constitutional: alert, NAD  HEENT: NCAT, DMM  Neck: Supple, No JVD  Respiratory: CTA-b/l  Cardiovascular: RRR s1s2, no m/r/g  Gastrointestinal: BS+, soft, NT/ND  : (+)suprapubic cath  Extremities: 1+ b/l LE edema  Neurological: reduced generalized strength  Back: no CVAT b/l  Skin: (+)cyanotic changes b/l feet  Access: RIJ tunneled cath          IMPRESSION: 74M w/ polio, neurogenic bladder/suprapubic catheter, 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 Last dialyzed today - due for next HD Monday 3/24    (2)Anemia - s/p IV iron; on Retacrit with HD    (3)PAD - s/p LE bypass 3/3/25 - cyanotic changes of toes b/l - for further management after discharge    (4)CV - multifactorial LE edema; improving with increasing intradialytic UF, and with improving nutritional parameters    (5)Neuro - reduced generalized strength - getting PT/awaiting NYDIA        RECOMMEND:   (1)Prosouce/high-protein diet  (2)Net HD Monday 3/24 - 1.5kg UF as able                  Rafita Denny MD  [facility]  Office/on call physician: [phone]  Cell (7a-7p): [phone]

## 2025-03-21 NOTE — PROGRESS NOTE ADULT - PROBLEM SELECTOR PLAN 6
-Monitor leukocytosis/fever curve  -CXR as above, no clear infiltrate  -ID f/u.  3/9: WBC slowly downtrending  3/10: con tto downtrend  3/11: wbc is slightly high o fver;  3/12: wbc increased further   no fever:  3/13: WBC spontaneously downtrended today  3/14: wbc slightly high ; no fever;  3/15; started on zosyn  3/16: urine culture is negative:  antibiotics dced by ID  3/17: wbc DOWNTRENDED  3/18: no new WBC now:  3/20; now on bactrim po  /3/21: no recent labs;  on bactrim

## 2025-03-21 NOTE — PROGRESS NOTE ADULT - SUBJECTIVE AND OBJECTIVE BOX
Patient is a 74y old  Male who presents with a chief complaint of ESRD requiring HD, uremia (21 Mar 2025 14:36)      SUBJECTIVE / OVERNIGHT EVENTS: ptn feels well. " i feel much better today" pain is controlled. still no accepting facility for [facility]DIA    MEDICATIONS  (STANDING):  albuterol/ipratropium for Nebulization 3 milliLiter(s) Nebulizer every 6 hours  aspirin  chewable 81 milliGRAM(s) Oral daily  atorvastatin 40 milliGRAM(s) Oral at bedtime  chlorhexidine 2% Cloths 1 Application(s) Topical daily  clopidogrel Tablet 75 milliGRAM(s) Oral daily  epoetin aleah-epbx (RETACRIT) Injectable 16296 Unit(s) IV Push <User Schedule>  guaiFENesin ER 1200 milliGRAM(s) Oral every 12 hours  hydrOXYzine hydrochloride 25 milliGRAM(s) Oral three times a day  ibuprofen  Tablet. 400 milliGRAM(s) Oral every 12 hours  mupirocin 2% Ointment 1 Application(s) Topical <User Schedule>  oxyCODONE  ER Tablet 30 milliGRAM(s) Oral every 12 hours  pantoprazole    Tablet 40 milliGRAM(s) Oral before breakfast  polyethylene glycol 3350 17 Gram(s) Oral daily  povidone iodine 10% Solution 1 Application(s) Topical two times a day  senna 2 Tablet(s) Oral at bedtime  trimethoprim   80 mG/sulfamethoxazole 400 mG 1 Tablet(s) Oral two times a day    MEDICATIONS  (PRN):  acetaminophen     Tablet .. 650 milliGRAM(s) Oral every 6 hours PRN Temp greater or equal to 38C (100.4F), Mild Pain (1 - 3)  benzocaine/menthol Lozenge 1 Lozenge Oral three times a day PRN Sore Throat  bisacodyl 5 milliGRAM(s) Oral every 12 hours PRN Constipation  HYDROmorphone  Injectable 2 milliGRAM(s) IV Push every 4 hours PRN Severe Pain (7 - 10)  oxyCODONE    IR 10 milliGRAM(s) Oral every 4 hours PRN Moderate Pain (4 - 6)  sodium chloride 0.9% lock flush 10 milliLiter(s) IV Push every 1 hour PRN Pre/post blood products, medications, blood draw, and to maintain line patency      Vital Signs Last 24 Hrs  T(F): 97 (03-21-25 @ 10:24), Max: 98.3 (03-20-25 @ 16:04)  HR: 81 (03-21-25 @ 10:24) (81 - 102)  BP: 115/55 (03-21-25 @ 10:24) (115/55 - 148/71)  RR: 18 (03-21-25 @ 10:24) (18 - 18)  SpO2: 98% (03-21-25 @ 10:24) (94% - 98%)  Telemetry:   CAPILLARY BLOOD GLUCOSE        I&O's Summary    21 Mar 2025 07:01  -  21 Mar 2025 15:42  --------------------------------------------------------  IN: 0 mL / OUT: 1500 mL / NET: -1500 mL        PHYSICAL EXAM:  GENERAL: NAD, well-developed  HEAD:  Atraumatic, Normocephalic  EYES: EOMI, PERRLA, conjunctiva and sclera clear  NECK: Supple, No JVD  CHEST/LUNG: Clear to auscultation bilaterally; No wheeze  HEART: Regular rate and rhythm; No murmurs, rubs, or gallops  ABDOMEN: Soft, Nontender, Nondistended; Bowel sounds present  EXTREMITIES:  2+ Peripheral Pulses, No clubbing, cyanosis, or edema  PSYCH: AAOx3  NEUROLOGY: non-focal  SKIN: No rashes or lesions

## 2025-03-21 NOTE — PROGRESS NOTE ADULT - ASSESSMENT

## 2025-03-21 NOTE — PROGRESS NOTE ADULT - PROBLEM SELECTOR PLAN 1
pulm wise seems pretty good:  remain son room air:   initial sob has resolved:  adv to do  incentive spirometry  :  always in bed:  high risk for pneumonia:  cielo pt

## 2025-03-21 NOTE — PROGRESS NOTE ADULT - SUBJECTIVE AND OBJECTIVE BOX
[facility]  SABINO Griffin Y. Patel, S. Shah, G. [facility]  [phone]  ([phone] - weekdays after 5pm and weekends)    Name: BRIAN DEMPSEY  Age/Gender: 74y Male  MRN: [id number]    Interval History:  Patient seen and examined this morning at HD.  States pain is overall improved, no new complaints.   Notes reviewed  No concerning overnight events  Afebrile   Allergies: No Known Allergies      Objective:  Vitals:   T(F): 97 (03-21-25 @ 10:24), Max: 98.3 (03-20-25 @ 16:04)  HR: 81 (03-21-25 @ 10:24) (81 - 102)  BP: 115/55 (03-21-25 @ 10:24) (115/55 - 148/71)  RR: 18 (03-21-25 @ 10:24) (18 - 18)  SpO2: 98% (03-21-25 @ 10:24) (94% - 98%)  Physical Examination:  General: no acute distress, on HD  HEENT: normocephalic, atraumatic  Respiratory: breathing comfortably  Cardiovascular: S1 and S2 present  Gastrointestinal: NT, ND, SPC site with dressing  Extremities: ischemic changes LE   Skin: no visible rash    Laboratory Studies:  CBC:   WBC Trend:  10.14 03-16-25 @ 11:40  11.98 03-15-25 @ 10:30    CMP:   Creatinine: 3.43 mg/dL (03-16-25 @ 11:40)  Creatinine: 2.84 mg/dL (03-15-25 @ 19:01)    Microbiology: reviewed   Culture - Urine (collected 03-15-25 @ 07:08)  Source: Catheterized Catheterized  Final Report (03-16-25 @ 08:38):    <10,000 CFU/mL Normal Urogenital Catie    Radiology: reviewed     Medications:  acetaminophen     Tablet .. 650 milliGRAM(s) Oral every 6 hours PRN  albuterol/ipratropium for Nebulization 3 milliLiter(s) Nebulizer every 6 hours  aspirin  chewable 81 milliGRAM(s) Oral daily  atorvastatin 40 milliGRAM(s) Oral at bedtime  benzocaine/menthol Lozenge 1 Lozenge Oral three times a day PRN  bisacodyl 5 milliGRAM(s) Oral every 12 hours PRN  chlorhexidine 2% Cloths 1 Application(s) Topical daily  clopidogrel Tablet 75 milliGRAM(s) Oral daily  epoetin aleah-epbx (RETACRIT) Injectable 41338 Unit(s) IV Push <User Schedule>  guaiFENesin ER 1200 milliGRAM(s) Oral every 12 hours  HYDROmorphone  Injectable 2 milliGRAM(s) IV Push every 4 hours PRN  hydrOXYzine hydrochloride 25 milliGRAM(s) Oral three times a day  ibuprofen  Tablet. 400 milliGRAM(s) Oral every 12 hours  mupirocin 2% Ointment 1 Application(s) Topical <User Schedule>  oxyCODONE    IR 10 milliGRAM(s) Oral every 4 hours PRN  oxyCODONE  ER Tablet 30 milliGRAM(s) Oral every 12 hours  pantoprazole    Tablet 40 milliGRAM(s) Oral before breakfast  polyethylene glycol 3350 17 Gram(s) Oral daily  povidone iodine 10% Solution 1 Application(s) Topical two times a day  senna 2 Tablet(s) Oral at bedtime  sodium chloride 0.9% lock flush 10 milliLiter(s) IV Push every 1 hour PRN  trimethoprim   80 mG/sulfamethoxazole 400 mG 1 Tablet(s) Oral two times a day  trimethoprim  160 mG/sulfamethoxazole 800 mG 1 Tablet(s) Oral once    Current Antimicrobials:  trimethoprim   80 mG/sulfamethoxazole 400 mG 1 Tablet(s) Oral two times a day  trimethoprim  160 mG/sulfamethoxazole 800 mG 1 Tablet(s) Oral once    Prior/Completed Antimicrobials:  piperacillin/tazobactam IVPB.  piperacillin/tazobactam IVPB.  piperacillin/tazobactam IVPB.-  piperacillin/tazobactam IVPB.-  vancomycin  IVPB  vancomycin  IVPB

## 2025-03-21 NOTE — PROGRESS NOTE ADULT - ASSESSMENT
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  came in after fall and for HD.     2/20 CTH neg   \Na 123 --> now 133   A1c 5.7   TSH WNL 3.46   o/e 2/21 AAOx2, uppers 4/5, lowers limited .  2/23; family bedside upset that he has pain in leg after he was moved.  patient going for imaging now.   s/p R IJ permacath by IR 2/25 2/27 AAOx3   sopoke with family bedisde 3/17 wife says mental status okay, sometimes up and down but good   3/18 was told he has "new neuropathy" spoke iwshahida patient and wife at bedside. states its the same from before not a new issue.    3/21 mental status stable. c/o pubic cathter     Imrpssion  1) AMS, waxing and waing , likely multifactorial from infection, metabolic/electrolyte derrangements/ delerium / medication effect , ureemia which is imporving   2) polio  3) fall 2/2 weakness  4) hyponatremia to 123 2/20  improved 133 -->136        - AVF outpatient     f/u vascular; no vascluar options ; f/u with Messi from [facility] to schedule outpatient procedure   - on DAPT   - was getting oxycodone ER 30mg BID and oxy PRN IR i10 and dilauded PRN ;   consider gabapentin 200mg TID or lyrica 50mg BID fo nueorpathy if no objection ; patient states he still has pain but its better.  vascular has seen patient.  poor prognosis of LLE   - f/u psych   - limit sedating meds   - continue to monitor and correct metabolic derrangements .  - delerium precuations.   - frequent re orientation  - check b12, RPR, ammonia level if never checked    - PT/OT   - check FS, glucose control <180  - GI/DVT ppx  - Thank you for allowing me to participate in the care of this patient. Call with questions.   dc planning [facility]DIA needs auth ; Freddie mojica   spoke to patient and wife in room 3/21 and patient in HD today   Paul Limon MD  Vascular Neurology  Office: [phone]

## 2025-03-21 NOTE — PROGRESS NOTE ADULT - ASSESSMENT
74 year old male with CKD, sp polio, paraplegia with associated neurogenic bladder s/p suprapubic catheter who was admitted s/p fall on 2/16.   CKD - ESRD, now s/p DEYA [place] 2/17, on HD  ruled out cellulitis around suprapubic cath  2/20 s/p RRT for tremors and confusion -- pt with chronic tremors although notably worse  mrsa negative   2/20 CXR with clear lungs   SPC site with chronic/stable inflammatory changes, no drainage - no sign of SSTI  CTA abd with occlusion of b/l external iliac arteries and b/l superficial femoral arteries with distal reconstitution at popliteal arteries; patent three vessel runoff of RLE with 2 vessel runoff of LLE with occlusion of L mid anterior tibial artery with distal reconstitution  Bcx negative to date   mental status at baseline  Vascular following for worsening PAD with worsening ischemic changes   -3/3 s/p RLE angiogram b/l iliac artery stents     3/16 no tenderness at suprapubic catheter site  no visible erythema at catheter site on exam  UA w/ many epithelial cells, urine culture negative  s/p zosyn 3/14-3/16  SPC site remains without erythema but now noted with some tan crusting on dressing  abd pain less but still represent, will treat for possible SPC site infection  remains afebrile, feels pain at SPC site, back and legs improved       Recommendations:   Continue Bactrim SS 1 tab PO Q12h (renally dosed) to finish 7d on 3/25  HD per renal    Monitor temps/WBC  Wound care following   Continue rest of care per primary team     Over the weekend Dr. Amy Bailey will be covering for our group. If you have any questions, concerns or new micro data please reach out to them using TEAMS *PREFERRED* or by calling our service at [phone].     Bridgette Ramirez M.D.  [facility]  Available on [employer] TEAMS - *PREFERRED*  [phone]  After 5pm on weekdays and all day on weekends - please call [phone]     Thank you for consulting us and involving us in the management of this patients case. In addition to reviewing history, imaging, documents, labs, microbiology, took into account antibiotic stewardship, local antibiogram and infection control strategies and potential transmission issues.

## 2025-03-22 NOTE — PROGRESS NOTE ADULT - SUBJECTIVE AND OBJECTIVE BOX
Date of Service: 03-22-25 @ 13:39    Patient is a 74y old  Male who presents with a chief complaint of ESRD requiring HD, uremia (22 Mar 2025 12:35)      Any change in ROS: seems to be doing ok ; no cough :  no phlegm    MEDICATIONS  (STANDING):  albuterol/ipratropium for Nebulization 3 milliLiter(s) Nebulizer every 6 hours  aspirin  chewable 81 milliGRAM(s) Oral daily  atorvastatin 40 milliGRAM(s) Oral at bedtime  chlorhexidine 2% Cloths 1 Application(s) Topical daily  clopidogrel Tablet 75 milliGRAM(s) Oral daily  epoetin aleah-epbx (RETACRIT) Injectable 85303 Unit(s) IV Push <User Schedule>  guaiFENesin ER 1200 milliGRAM(s) Oral every 12 hours  hydrOXYzine hydrochloride 25 milliGRAM(s) Oral three times a day  ibuprofen  Tablet. 400 milliGRAM(s) Oral every 12 hours  mupirocin 2% Ointment 1 Application(s) Topical <User Schedule>  oxyCODONE  ER Tablet 30 milliGRAM(s) Oral every 12 hours  pantoprazole    Tablet 40 milliGRAM(s) Oral before breakfast  polyethylene glycol 3350 17 Gram(s) Oral daily  povidone iodine 10% Solution 1 Application(s) Topical two times a day  senna 2 Tablet(s) Oral at bedtime  trimethoprim   80 mG/sulfamethoxazole 400 mG 1 Tablet(s) Oral two times a day    MEDICATIONS  (PRN):  acetaminophen     Tablet .. 650 milliGRAM(s) Oral every 6 hours PRN Temp greater or equal to 38C (100.4F), Mild Pain (1 - 3)  benzocaine/menthol Lozenge 1 Lozenge Oral three times a day PRN Sore Throat  bisacodyl 5 milliGRAM(s) Oral every 12 hours PRN Constipation  HYDROmorphone  Injectable 2 milliGRAM(s) IV Push every 4 hours PRN Severe Pain (7 - 10)  oxyCODONE    IR 10 milliGRAM(s) Oral every 4 hours PRN Moderate Pain (4 - 6)  sodium chloride 0.9% lock flush 10 milliLiter(s) IV Push every 1 hour PRN Pre/post blood products, medications, blood draw, and to maintain line patency    Vital Signs Last 24 Hrs  T(C): 36.6 (22 Mar 2025 08:30), Max: 36.7 (22 Mar 2025 00:48)  T(F): 97.9 (22 Mar 2025 08:30), Max: 98.1 (22 Mar 2025 00:48)  HR: 76 (22 Mar 2025 08:30) (76 - 99)  BP: 112/59 (22 Mar 2025 08:30) (103/66 - 133/77)  BP(mean): --  RR: 18 (22 Mar 2025 08:30) (18 - 18)  SpO2: 97% (22 Mar 2025 08:30) (97% - 98%)    Parameters below as of 22 Mar 2025 08:30  Patient On (Oxygen Delivery Method): room air        I&O's Summary    21 Mar 2025 07:01  -  22 Mar 2025 07:00  --------------------------------------------------------  IN: 0 mL / OUT: 2300 mL / NET: -2300 mL          Physical Exam:   GENERAL: NAD, well-groomed, well-developed  HEENT: VINNY/   Atraumatic, Normocephalic  ENMT: No tonsillar erythema, exudates, or enlargement; Moist mucous membranes, Good dentition, No lesions  NECK: Supple, No JVD, Normal thyroid  CHEST/LUNG: Clear to auscultaion  CVS: Regular rate and rhythm; No murmurs, rubs, or gallops  GI: : Soft, Nontender, Nondistended; Bowel sounds present  NERVOUS SYSTEM:  Alert & Oriented X3  EXTREMITIES: - edema ; gangrenosus digits;   LYMPH: No lymphadenopathy noted  SKIN: No rashes or lesions  ENDOCRINOLOGY: No Thyromegaly  PSYCH: Appropriate    Labs:                CAPILLARY BLOOD GLUCOSE      rad< from: Xray Chest 1 View- PORTABLE-Urgent (Xray Chest 1 View- PORTABLE-Urgent .) (03.01.25 @ 22:48) >    PROCEDURE DATE:  03/01/2025          INTERPRETATION:  EXAMINATION: XR CHEST URGENT    CLINICAL INDICATION: cough    TECHNIQUE: Single frontal, portable view of the chest was obtained.    COMPARISON: Chest x-ray 2/20/2025.    FINDINGS:  Right-sided tunneled hemodialysis catheter terminates in the SVC.  The heart is not accurately assessed in this AP projection.  The lungs are clear.  There is no pneumothorax or pleural effusion.  No acute bony abnormality.    IMPRESSION:  Clear lungs.    --- End of Report ---          GILBERT LEON MD; Resident Radiologist  This document has been electronically signed.   JESUS HALEY DO; Attending Interventional Radiologist  This document has been electronically signed. Mar  2 2025  9:37AM    < end of copied text >  < from: CT Chest No Cont (02.23.25 @ 16:46) >  IMPRESSION:  Mucoid impaction and airway associated subpleural groundglass opacities,   most prominent in left lower lobe and to a lesser degree in right lower   lobe.  Mild bronchial wall thickening, likely inflammatory.    --- End of Report ---            TREVON YOUNG MD; Attending Radiologist  This document has been electronically signed. Feb 23 2025  6:22PM    < end of copied text >                  RECENT CULTURES:        RESPIRATORY CULTURES:          Studies  Chest X-RAY  CT SCAN Chest   Venous Dopplers: LE:   CT Abdomen  Others

## 2025-03-22 NOTE — PROGRESS NOTE ADULT - PROBLEM SELECTOR PLAN 6
-Monitor leukocytosis/fever curve  -CXR as above, no clear infiltrate  -ID f/u.  3/9: WBC slowly downtrending  3/10: con tto downtrend  3/11: wbc is slightly high o fver;  3/12: wbc increased further   no fever:  3/13: WBC spontaneously downtrended today  3/14: wbc slightly high ; no fever;  3/15; started on zosyn  3/16: urine culture is negative:  antibiotics dced by ID  3/17: wbc DOWNTRENDED  3/18: no new WBC now:  3/20; now on bactrim po  /3/21: no recent labs;  on bactrim  3/22; no bactrim ;

## 2025-03-22 NOTE — PROGRESS NOTE ADULT - PROBLEM SELECTOR PLAN 1
pulm wise seems pretty good:  remain son room air:   initial sob has resolved:  adv to do  incentive spirometry  :  always in bed:  high risk for pneumonia:  cielo pt    3/22: seems to be doing ok :  on room air:   ct scan chest reviewed   seems to be comfortable'

## 2025-03-22 NOTE — PROGRESS NOTE ADULT - PROBLEM SELECTOR PLAN 3
-Neuro following  -ABG 2/20 acceptable  -Pt lethargic 2/22, ABG never sent but AMS appears to be improving   -ABG 2/28 with no co2 retention.    3/21: now he is totally normal  3/22: resolved

## 2025-03-22 NOTE — PROGRESS NOTE ADULT - ASSESSMENT
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 Last dialyzed yesterday- due for next HD Monday 3/24    (2)Anemia - s/p IV iron; on Retacrit with HD    (3)PAD - s/p LE bypass 3/3/25 - cyanotic changes of toes b/l - for further management after discharge    (4)CV - multifactorial LE edema; improving with increasing intradialytic UF, and with improving nutritional parameters    (5)Neuro - reduced generalized strength - getting PT/awaiting [facility]DIA    RECOMMEND:   (1)Next HD Monday 3/24 - 1.5kg UF as able  (2)Prosouce/high-protein diet    Beryl Darden DNP  [facility]  [phone]

## 2025-03-22 NOTE — PROGRESS NOTE ADULT - ASSESSMENT
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 The initial plan was that he would present to the [facility] ER Monday 2/17 for HD initiation. However, day of admission, he fell and sustained trauma to his knee. Called to consult for cough, elevated R hemidiaphragm.

## 2025-03-22 NOTE — PROGRESS NOTE ADULT - SUBJECTIVE AND OBJECTIVE BOX
Patient is a 74y old  Male who presents with a chief complaint of ESRD requiring HD, uremia (22 Mar 2025 13:39)      SUBJECTIVE / OVERNIGHT EVENTS: ptn is in good spirits. pain is controlled. wife at bedside. all questions answered.     MEDICATIONS  (STANDING):  albuterol/ipratropium for Nebulization 3 milliLiter(s) Nebulizer every 6 hours  aspirin  chewable 81 milliGRAM(s) Oral daily  atorvastatin 40 milliGRAM(s) Oral at bedtime  chlorhexidine 2% Cloths 1 Application(s) Topical daily  clopidogrel Tablet 75 milliGRAM(s) Oral daily  epoetin aleah-epbx (RETACRIT) Injectable 91791 Unit(s) IV Push <User Schedule>  guaiFENesin ER 1200 milliGRAM(s) Oral every 12 hours  hydrOXYzine hydrochloride 25 milliGRAM(s) Oral three times a day  ibuprofen  Tablet. 400 milliGRAM(s) Oral every 12 hours  mupirocin 2% Ointment 1 Application(s) Topical <User Schedule>  oxyCODONE  ER Tablet 30 milliGRAM(s) Oral every 12 hours  pantoprazole    Tablet 40 milliGRAM(s) Oral before breakfast  polyethylene glycol 3350 17 Gram(s) Oral daily  povidone iodine 10% Solution 1 Application(s) Topical two times a day  senna 2 Tablet(s) Oral at bedtime  trimethoprim   80 mG/sulfamethoxazole 400 mG 1 Tablet(s) Oral two times a day    MEDICATIONS  (PRN):  acetaminophen     Tablet .. 650 milliGRAM(s) Oral every 6 hours PRN Temp greater or equal to 38C (100.4F), Mild Pain (1 - 3)  benzocaine/menthol Lozenge 1 Lozenge Oral three times a day PRN Sore Throat  bisacodyl 5 milliGRAM(s) Oral every 12 hours PRN Constipation  HYDROmorphone  Injectable 2 milliGRAM(s) IV Push every 4 hours PRN Severe Pain (7 - 10)  oxyCODONE    IR 10 milliGRAM(s) Oral every 4 hours PRN Moderate Pain (4 - 6)  sodium chloride 0.9% lock flush 10 milliLiter(s) IV Push every 1 hour PRN Pre/post blood products, medications, blood draw, and to maintain line patency      Vital Signs Last 24 Hrs  T(F): 97.9 (03-22-25 @ 08:30), Max: 98.1 (03-22-25 @ 00:48)  HR: 76 (03-22-25 @ 08:30) (76 - 99)  BP: 112/59 (03-22-25 @ 08:30) (103/66 - 133/77)  RR: 18 (03-22-25 @ 08:30) (18 - 18)  SpO2: 97% (03-22-25 @ 08:30) (97% - 98%)  Telemetry:   CAPILLARY BLOOD GLUCOSE        I&O's Summary    21 Mar 2025 07:01  -  22 Mar 2025 07:00  --------------------------------------------------------  IN: 0 mL / OUT: 2300 mL / NET: -2300 mL        PHYSICAL EXAM:  GENERAL: NAD, well-developed  HEAD:  Atraumatic, Normocephalic  EYES: EOMI, PERRLA, conjunctiva and sclera clear  NECK: Supple, No JVD  CHEST/LUNG: Clear to auscultation bilaterally; No wheeze  HEART: Regular rate and rhythm; No murmurs, rubs, or gallops  ABDOMEN: Soft, Nontender, Nondistended; Bowel sounds present  EXTREMITIES:  2+ Peripheral Pulses, No clubbing, cyanosis, or edema  PSYCH: AAOx3  NEUROLOGY: non-focal  SKIN: No rashes or lesions    LABS:                    RADIOLOGY & ADDITIONAL TESTS:    Imaging Personally Reviewed:    Consultant(s) Notes Reviewed:      Care Discussed with Consultants/Other Providers:

## 2025-03-22 NOTE — PROGRESS NOTE ADULT - SUBJECTIVE AND OBJECTIVE BOX
NEPHROLOGY     Patient seen and examined resting on room air, states feeling better though still with mild suprapubic discomfort, no sob, in no acute distress.     MEDICATIONS  (STANDING):  albuterol/ipratropium for Nebulization 3 milliLiter(s) Nebulizer every 6 hours  aspirin  chewable 81 milliGRAM(s) Oral daily  atorvastatin 40 milliGRAM(s) Oral at bedtime  chlorhexidine 2% Cloths 1 Application(s) Topical daily  clopidogrel Tablet 75 milliGRAM(s) Oral daily  epoetin aleah-epbx (RETACRIT) Injectable 82232 Unit(s) IV Push <User Schedule>  guaiFENesin ER 1200 milliGRAM(s) Oral every 12 hours  hydrOXYzine hydrochloride 25 milliGRAM(s) Oral three times a day  ibuprofen  Tablet. 400 milliGRAM(s) Oral every 12 hours  mupirocin 2% Ointment 1 Application(s) Topical <User Schedule>  oxyCODONE  ER Tablet 30 milliGRAM(s) Oral every 12 hours  pantoprazole    Tablet 40 milliGRAM(s) Oral before breakfast  polyethylene glycol 3350 17 Gram(s) Oral daily  povidone iodine 10% Solution 1 Application(s) Topical two times a day  senna 2 Tablet(s) Oral at bedtime  trimethoprim   80 mG/sulfamethoxazole 400 mG 1 Tablet(s) Oral two times a day    VITALS:  T(C): , Max: 36.7 (03-22-25 @ 00:48)  T(F): , Max: 98.1 (03-22-25 @ 00:48)  HR: 76 (03-22-25 @ 08:30)  BP: 112/59 (03-22-25 @ 08:30)  RR: 18 (03-22-25 @ 08:30)  SpO2: 97% (03-22-25 @ 08:30)    I and O's:    03-21 @ 07:01  -  03-22 @ 07:00  --------------------------------------------------------  IN: 0 mL / OUT: 2300 mL / NET: -2300 mL    PHYSICAL EXAM:  Constitutional: alert, NAD  HEENT: NCAT, DMM  Neck: Supple, No JVD  Respiratory: CTA-b/l  Cardiovascular: RRR s1s2, no m/r/g  Gastrointestinal: BS+, soft, NT/ND  : (+)suprapubic cath  Extremities: 1+ b/l LE edema  Neurological: reduced generalized strength  Back: no CVAT b/l  Skin: (+)cyanotic changes b/l feet  Access: RIJ tunneled cath    LABS:  No new labs

## 2025-03-22 NOTE — PROGRESS NOTE ADULT - SUBJECTIVE AND OBJECTIVE BOX
Subjective: Patient seen and examined. No new events except as noted.     REVIEW OF SYSTEMS:    CONSTITUTIONAL: +weakness, fevers or chills  EYES/ENT: No visual changes;  No vertigo or throat pain   NECK: No pain or stiffness  RESPIRATORY: No cough, wheezing, hemoptysis; No shortness of breath  CARDIOVASCULAR: No chest pain or palpitations  GASTROINTESTINAL: No abdominal or epigastric pain. No nausea, vomiting, or hematemesis; No diarrhea or constipation. No melena or hematochezia.  GENITOURINARY: No dysuria, frequency or hematuria  NEUROLOGICAL: No numbness or weakness  SKIN: No itching, burning, rashes, or lesions   All other review of systems is negative unless indicated above.    MEDICATIONS:  MEDICATIONS  (STANDING):  albuterol/ipratropium for Nebulization 3 milliLiter(s) Nebulizer every 6 hours  aspirin  chewable 81 milliGRAM(s) Oral daily  atorvastatin 40 milliGRAM(s) Oral at bedtime  chlorhexidine 2% Cloths 1 Application(s) Topical daily  clopidogrel Tablet 75 milliGRAM(s) Oral daily  epoetin aleah-epbx (RETACRIT) Injectable 43561 Unit(s) IV Push <User Schedule>  guaiFENesin ER 1200 milliGRAM(s) Oral every 12 hours  hydrOXYzine hydrochloride 25 milliGRAM(s) Oral three times a day  ibuprofen  Tablet. 400 milliGRAM(s) Oral every 12 hours  mupirocin 2% Ointment 1 Application(s) Topical <User Schedule>  oxyCODONE  ER Tablet 30 milliGRAM(s) Oral every 12 hours  pantoprazole    Tablet 40 milliGRAM(s) Oral before breakfast  polyethylene glycol 3350 17 Gram(s) Oral daily  povidone iodine 10% Solution 1 Application(s) Topical two times a day  senna 2 Tablet(s) Oral at bedtime  trimethoprim   80 mG/sulfamethoxazole 400 mG 1 Tablet(s) Oral two times a day      PHYSICAL EXAM:  T(C): 36.7 (03-22-25 @ 16:01), Max: 36.7 (03-22-25 @ 00:48)  HR: 81 (03-22-25 @ 16:01) (76 - 81)  BP: 107/65 (03-22-25 @ 16:01) (107/65 - 133/77)  RR: 18 (03-22-25 @ 16:01) (18 - 18)  SpO2: 93% (03-22-25 @ 16:01) (93% - 98%)  Wt(kg): --  I&O's Summary    21 Mar 2025 07:01  -  22 Mar 2025 07:00  --------------------------------------------------------  IN: 0 mL / OUT: 2300 mL / NET: -2300 mL          Appearance: NAD  HEENT:  Dry oral mucosa, PERRL, EOMI	  Lymphatic: No lymphadenopathy  Cardiovascular: Normal S1 S2, No JVD, No murmurs, No edema  Respiratory: Lungs clear to auscultation	  Psychiatry: A & O x 3, Mood & affect appropriate  Skin: No rashes, No ecchymoses, No cyanosis	  Neurologic: Non-focal  GI/Abd: Soft, obese, nontender. Dressing to Q C/D/I. Suprapubic catheter in place  Ext: Palp femoral pulses bilat. BLE atrophic, minimal dorsi/plantarflexion bilaterally. Sensation grossly intact. LLE edematous compared to R, erythema to right toes/forefoot. mottling of right toes/forefoot/heel  LLE:  small superficial abrasion, +edema and +ecchymosis over L knee  +TTP over L knee, no TTP along remainder of extremity; compartments soft  Limited ROM at knee 2/2 pain  No gross varus/valgus laxity, but assessment limited 2/2 pain  Motor: TA/EHL/GS/FHL intact  Sensory: DP/SP/Tib/Jordan/Saph SILT  +DP pulse (symmetric relative to contralateral side), WWP   pressure wound from the LLE immobilizer posteriorly proximal to the knee.  Vascular: Peripheral pulses palpable 2+ bilaterally        LABS:    CARDIAC MARKERS:                  proBNP:   Lipid Profile:   HgA1c:   TSH:             TELEMETRY: 	    ECG:  	  RADIOLOGY:   DIAGNOSTIC TESTING:  [ ] Echocardiogram:  [ ]  Catheterization:  [ ] Stress Test:    OTHER:

## 2025-03-22 NOTE — PROGRESS NOTE ADULT - ASSESSMENT

## 2025-03-22 NOTE — PROGRESS NOTE ADULT - PROBLEM SELECTOR PLAN 4
Goal Outcome Evaluation:  Plan of Care Reviewed With: patient        Progress: improving  Outcome Evaluation: Pt requested pain and nausea medication once during the night. C/o pain and burning when urinating (ongoing) but states that overall she's feeling better. No distress noted.   -Per vascular  -S/p b/l iliac stent placement on 3/3/25  -Podiatry f/u.  3/9: has cyanosed digits:  defer to vascular  /: vascular following  3/18: digits remains cyanosed and tip gangrenous:  defer to primary team  3/20: vascular following  3/22; seems to be doing  ok   per vascular

## 2025-03-23 NOTE — PROGRESS NOTE ADULT - SUBJECTIVE AND OBJECTIVE BOX
Subjective: Patient seen and examined. No new events except as noted.     REVIEW OF SYSTEMS:    CONSTITUTIONAL: +weakness, fevers or chills  EYES/ENT: No visual changes;  No vertigo or throat pain   NECK: No pain or stiffness  RESPIRATORY: No cough, wheezing, hemoptysis; No shortness of breath  CARDIOVASCULAR: No chest pain or palpitations  GASTROINTESTINAL: No abdominal or epigastric pain. No nausea, vomiting, or hematemesis; No diarrhea or constipation. No melena or hematochezia.  GENITOURINARY: No dysuria, frequency or hematuria  NEUROLOGICAL: No numbness or weakness  SKIN: No itching, burning, rashes, or lesions   All other review of systems is negative unless indicated above.    MEDICATIONS:  MEDICATIONS  (STANDING):  albuterol/ipratropium for Nebulization 3 milliLiter(s) Nebulizer every 6 hours  aspirin  chewable 81 milliGRAM(s) Oral daily  atorvastatin 40 milliGRAM(s) Oral at bedtime  chlorhexidine 2% Cloths 1 Application(s) Topical daily  clopidogrel Tablet 75 milliGRAM(s) Oral daily  epoetin aleah-epbx (RETACRIT) Injectable 57517 Unit(s) IV Push <User Schedule>  guaiFENesin ER 1200 milliGRAM(s) Oral every 12 hours  hydrOXYzine hydrochloride 25 milliGRAM(s) Oral three times a day  ibuprofen  Tablet. 400 milliGRAM(s) Oral every 12 hours  mupirocin 2% Ointment 1 Application(s) Topical <User Schedule>  oxyCODONE  ER Tablet 30 milliGRAM(s) Oral every 12 hours  pantoprazole    Tablet 40 milliGRAM(s) Oral before breakfast  polyethylene glycol 3350 17 Gram(s) Oral daily  povidone iodine 10% Solution 1 Application(s) Topical two times a day  senna 2 Tablet(s) Oral at bedtime  trimethoprim   80 mG/sulfamethoxazole 400 mG 1 Tablet(s) Oral two times a day      PHYSICAL EXAM:  T(C): 36.8 (03-23-25 @ 07:14), Max: 36.8 (03-23-25 @ 07:14)  HR: 86 (03-23-25 @ 07:14) (81 - 86)  BP: 149/84 (03-23-25 @ 07:14) (107/65 - 149/84)  RR: 18 (03-23-25 @ 07:14) (18 - 18)  SpO2: 94% (03-23-25 @ 07:14) (93% - 95%)  Wt(kg): --  I&O's Summary        Appearance: NAD  HEENT:  Dry oral mucosa, PERRL, EOMI	  Lymphatic: No lymphadenopathy  Cardiovascular: Normal S1 S2, No JVD, No murmurs, No edema  Respiratory: Lungs clear to auscultation	  Psychiatry: A & O x 3, Mood & affect appropriate  Skin: No rashes, No ecchymoses, No cyanosis	  Neurologic: Non-focal  GI/Abd: Soft, obese, nontender. Dressing to Q C/D/I. Suprapubic catheter in place  Ext: Palp femoral pulses bilat. BLE atrophic, minimal dorsi/plantarflexion bilaterally. Sensation grossly intact. LLE edematous compared to R, erythema to right toes/forefoot. mottling of right toes/forefoot/heel  LLE:  small superficial abrasion, +edema and +ecchymosis over L knee  +TTP over L knee, no TTP along remainder of extremity; compartments soft  Limited ROM at knee 2/2 pain  No gross varus/valgus laxity, but assessment limited 2/2 pain  Motor: TA/EHL/GS/FHL intact  Sensory: DP/SP/Tib/Jordan/Saph SILT  +DP pulse (symmetric relative to contralateral side), WWP   pressure wound from the LLE immobilizer posteriorly proximal to the knee.  Vascular: Peripheral pulses palpable 2+ bilaterally      LABS:    CARDIAC MARKERS:                  proBNP:   Lipid Profile:   HgA1c:   TSH:             TELEMETRY: 	    ECG:  	  RADIOLOGY:   DIAGNOSTIC TESTING:  [ ] Echocardiogram:  [ ]  Catheterization:  [ ] Stress Test:    OTHER:

## 2025-03-23 NOTE — PROGRESS NOTE ADULT - PROBLEM SELECTOR PLAN 1
4/19/2023          To Whom It May Concern:    Lily Silva. is currently under my medical care at this time. He may return to school on 04/20/2023. [email or web address] Activity is restricted as follows: none. If you require additional information please contact our office.         Sincerely,    Gustabo Mike PA-C          Document generated by:  Gustabo Mike PA-C pulm wise seems pretty good:  remain son room air:   initial sob has resolved:  adv to do  incentive spirometry  :  always in bed:  high risk for pneumonia:  dw pt    3/22: seems to be doing ok :  on room air:   ct scan chest reviewed   seems to be comfortable'    3/23: rpt cxr today  : on room air;   no sob:  no wheezing;   last chest xray three weeka ago

## 2025-03-23 NOTE — PROGRESS NOTE ADULT - ASSESSMENT
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 The initial plan was that he would present to the [facility] ER Monday 2/17 for HD initiation. However, day of admission, he fell and sustained trauma to his knee. Called to consult for cough, elevated R hemidiaphragm.

## 2025-03-23 NOTE — PROGRESS NOTE ADULT - PROBLEM SELECTOR PLAN 6
-Monitor leukocytosis/fever curve  -CXR as above, no clear infiltrate  -ID f/u.  3/9: WBC slowly downtrending  3/10: con tto downtrend  3/11: wbc is slightly high o fver;  3/12: wbc increased further   no fever:  3/13: WBC spontaneously downtrended today  3/14: wbc slightly high ; no fever;  3/15; started on zosyn  3/16: urine culture is negative:  antibiotics dced by ID  3/17: wbc DOWNTRENDED  3/18: no new WBC now:  3/20; now on bactrim po  /3/21: no recent labs;  on bactrim  3/22; no bactrim ;  3/23: no recent labs

## 2025-03-23 NOTE — PROGRESS NOTE ADULT - ASSESSMENT
74 year old male with CKD, sp polio, paraplegia with associated neurogenic bladder s/p suprapubic catheter who was admitted s/p fall on 2/16.   CKD - ESRD, now s/p DEYA [place] 2/17, on HD  ruled out cellulitis around suprapubic cath  2/20 s/p RRT for tremors and confusion -- pt with chronic tremors although notably worse  mrsa negative   2/20 CXR with clear lungs   SPC site with chronic/stable inflammatory changes, no drainage - no sign of SSTI  CTA abd with occlusion of b/l external iliac arteries and b/l superficial femoral arteries with distal reconstitution at popliteal arteries; patent three vessel runoff of RLE with 2 vessel runoff of LLE with occlusion of L mid anterior tibial artery with distal reconstitution  Bcx negative to date   mental status at baseline  Vascular following for worsening PAD with worsening ischemic changes   -3/3 s/p RLE angiogram b/l iliac artery stents     3/16 no tenderness at suprapubic catheter site  no visible erythema at catheter site on exam  UA w/ many epithelial cells, urine culture negative  s/p zosyn 3/14-3/16  SPC site remains without erythema but now noted with some tan crusting on dressing  abd pain less but still represent, will treat for possible SPC site infection  remains afebrile, feels pain at SPC site, back and legs improved     3/23 reporting vomiting x5 episodes  No diarrhea or other focal sx    Recommendations:   Will send RVP, add on full panel if flu/RSV/COVID negative  Continue Bactrim SS 1 tab PO Q12h (renally dosed) to finish 7d on 3/25  HD per renal    Monitor temps/WBC  Wound care following   Continue rest of care per primary team     Dr. Ramirez to resume care 3/24  Patient evaluated with face-to-face time in addition to reviewing history, labs, microbiology, and imaging.   Antibiotic stewardship, local antibiogram, infection control strategies and potential transmission issues taken into consideration at time of treatment decision making process.   Thank you for allowing us to participate in the care of your patient.  Infectious Diseases will follow. Please call with any questions.  Amy Bailey M.D.  Available on [employer] TEAMS -- *PREFERRED*  [facility] [phone]  For after 5 P.M. and weekends, please call [phone]

## 2025-03-23 NOTE — PROGRESS NOTE ADULT - SUBJECTIVE AND OBJECTIVE BOX
Neurology      S; patient seen. no neuro changes, wife at bedside. c/o nausea today      Medications: MEDICATIONS  (STANDING):  albuterol/ipratropium for Nebulization 3 milliLiter(s) Nebulizer every 6 hours  aspirin  chewable 81 milliGRAM(s) Oral daily  atorvastatin 40 milliGRAM(s) Oral at bedtime  chlorhexidine 2% Cloths 1 Application(s) Topical daily  clopidogrel Tablet 75 milliGRAM(s) Oral daily  epoetin aleah-epbx (RETACRIT) Injectable 57638 Unit(s) IV Push <User Schedule>  guaiFENesin ER 1200 milliGRAM(s) Oral every 12 hours  hydrOXYzine hydrochloride 25 milliGRAM(s) Oral three times a day  ibuprofen  Tablet. 400 milliGRAM(s) Oral every 12 hours  mupirocin 2% Ointment 1 Application(s) Topical <User Schedule>  oxyCODONE  ER Tablet 30 milliGRAM(s) Oral every 12 hours  pantoprazole    Tablet 40 milliGRAM(s) Oral before breakfast  polyethylene glycol 3350 17 Gram(s) Oral daily  povidone iodine 10% Solution 1 Application(s) Topical two times a day  senna 2 Tablet(s) Oral at bedtime  trimethoprim   80 mG/sulfamethoxazole 400 mG 1 Tablet(s) Oral two times a day    MEDICATIONS  (PRN):  acetaminophen     Tablet .. 650 milliGRAM(s) Oral every 6 hours PRN Temp greater or equal to 38C (100.4F), Mild Pain (1 - 3)  benzocaine/menthol Lozenge 1 Lozenge Oral three times a day PRN Sore Throat  bisacodyl 5 milliGRAM(s) Oral every 12 hours PRN Constipation  HYDROmorphone  Injectable 2 milliGRAM(s) IV Push every 4 hours PRN Severe Pain (7 - 10)  oxyCODONE    IR 10 milliGRAM(s) Oral every 4 hours PRN Moderate Pain (4 - 6)  sodium chloride 0.9% lock flush 10 milliLiter(s) IV Push every 1 hour PRN Pre/post blood products, medications, blood draw, and to maintain line patency       Vitals:  Vital Signs Last 24 Hrs  T(C): 36.8 (23 Mar 2025 07:14), Max: 36.8 (23 Mar 2025 07:14)  T(F): 98.3 (23 Mar 2025 07:14), Max: 98.3 (23 Mar 2025 07:14)  HR: 86 (23 Mar 2025 07:14) (81 - 86)  BP: 149/84 (23 Mar 2025 07:14) (107/65 - 149/84)  BP(mean): --  RR: 18 (23 Mar 2025 07:14) (18 - 18)  SpO2: 94% (23 Mar 2025 07:14) (93% - 95%)    Parameters below as of 23 Mar 2025 07:14  Patient On (Oxygen Delivery Method): room air                  General Appearance: Appropriately dressed and in no acute distress       Head: Normocephalic, atraumatic and no dysmorphic features  Ear, Nose, and Throat: Moist mucous membranes  CVS: S1S2+  Resp: No SOB, no wheeze or rhonchi  GI: soft NT/ND  Extremities: LE PVD : dusky toes noted   Skin: + decub      Neurological Exam:  Mental Status: Awake, alert and oriented x 2.  Able to follow simple and complex verbal commands. Able to name and repeat. fluent speech. No obvious aphasia or dysarthria noted.   Cranial Nerves: PERRL, EOMI, VFFC, sensation V1-V3 intact,  no obvious facial asymmetry, equal elevation of palate, scm/trap 5/5, tongue is midline on protrusion. no obvious papilledema on fundoscopic exam. hearing is grossly intact.   Motor: Normal bulk, tone and strength throughout uppers 4/ lowers 0-/1 5   Sensation: Intact to light touch and pinprick throughout.     Coordination: No dysmetria on FNF   Gait: deferred, wheelchair bound     Data/Labs/Imaging which I personally reviewed.         LABS:    no new labs                   < from: CT Head No Cont (02.20.25 @ 18:47) >    ACC: [id number] EXAM:  CT BRAIN   ORDERED BY: GUY DE LA CRUZ     PROCEDURE DATE:  02/20/2025          INTERPRETATION:  CLINICAL INDICATION: Altered mental status    TECHNIQUE: Axial CT scanning of the brain was obtained from the skull   base to the vertex without the administration of intravenous contrast.   Reformatted coronal and sagittal images were subsequently obtained and   reviewed.    COMPARISON: None    FINDINGS:  There is no CT evidence of acute transcortical infarct. Age-related   involutional changes and chronic microvascular ischemic changes.    There is no hydrocephalus, mass effect, or acute intracranial hemorrhage.   No extra-axial collection. Basal cisterns are patent.    The visualized paranasal sinuses and mastoid air cells are clear.    The calvarium is intact.    IMPRESSION:  No evidence of acute transcortical infarct, acute intracranial   hemorrhage, or mass effect.    --- End of Report ---            CORTNEY REARDON MD; Attending Radiologist  This document has been electronically signed. Feb 20 2025  7:07PM    < end of copied text >

## 2025-03-23 NOTE — PROGRESS NOTE ADULT - SUBJECTIVE AND OBJECTIVE BOX
Patient is a 74y old  Male who presents with a chief complaint of ESRD requiring HD, uremia (23 Mar 2025 15:07)      SUBJECTIVE / OVERNIGHT EVENTS: ptn is in good spirits, says he vomited "spit" this am, but tolerated all meals without vomiting. no abd pain, no undigested food in the vomit. afebrile, no diarrhea, RVP neg. GI consulted.     MEDICATIONS  (STANDING):  albuterol/ipratropium for Nebulization 3 milliLiter(s) Nebulizer every 6 hours  aspirin  chewable 81 milliGRAM(s) Oral daily  atorvastatin 40 milliGRAM(s) Oral at bedtime  chlorhexidine 2% Cloths 1 Application(s) Topical daily  clopidogrel Tablet 75 milliGRAM(s) Oral daily  epoetin aleah-epbx (RETACRIT) Injectable 43556 Unit(s) IV Push <User Schedule>  guaiFENesin ER 1200 milliGRAM(s) Oral every 12 hours  hydrOXYzine hydrochloride 25 milliGRAM(s) Oral three times a day  ibuprofen  Tablet. 400 milliGRAM(s) Oral every 12 hours  mupirocin 2% Ointment 1 Application(s) Topical <User Schedule>  oxyCODONE  ER Tablet 30 milliGRAM(s) Oral every 12 hours  pantoprazole    Tablet 40 milliGRAM(s) Oral before breakfast  polyethylene glycol 3350 17 Gram(s) Oral daily  povidone iodine 10% Solution 1 Application(s) Topical two times a day  senna 2 Tablet(s) Oral at bedtime  trimethoprim   80 mG/sulfamethoxazole 400 mG 1 Tablet(s) Oral two times a day    MEDICATIONS  (PRN):  acetaminophen     Tablet .. 650 milliGRAM(s) Oral every 6 hours PRN Temp greater or equal to 38C (100.4F), Mild Pain (1 - 3)  benzocaine/menthol Lozenge 1 Lozenge Oral three times a day PRN Sore Throat  bisacodyl 5 milliGRAM(s) Oral every 12 hours PRN Constipation  HYDROmorphone  Injectable 2 milliGRAM(s) IV Push every 4 hours PRN Severe Pain (7 - 10)  oxyCODONE    IR 10 milliGRAM(s) Oral every 4 hours PRN Moderate Pain (4 - 6)  sodium chloride 0.9% lock flush 10 milliLiter(s) IV Push every 1 hour PRN Pre/post blood products, medications, blood draw, and to maintain line patency      Vital Signs Last 24 Hrs  T(F): 98.6 (03-23-25 @ 16:09), Max: 98.6 (03-23-25 @ 16:09)  HR: 78 (03-23-25 @ 16:09) (78 - 86)  BP: 149/67 (03-23-25 @ 16:09) (123/68 - 149/84)  RR: 18 (03-23-25 @ 16:09) (18 - 18)  SpO2: 94% (03-23-25 @ 16:09) (94% - 95%)  Telemetry:   CAPILLARY BLOOD GLUCOSE        I&O's Summary      PHYSICAL EXAM:  GENERAL: NAD, well-developed  HEAD:  Atraumatic, Normocephalic  EYES: EOMI, PERRLA, conjunctiva and sclera clear  NECK: Supple, No JVD  CHEST/LUNG: Clear to auscultation bilaterally; No wheeze  HEART: Regular rate and rhythm; No murmurs, rubs, or gallops  ABDOMEN: Soft, Nontender, Nondistended; Bowel sounds present  EXTREMITIES:  2+ Peripheral Pulses, No clubbing, cyanosis, or edema  PSYCH: AAOx3  NEUROLOGY: non-focal  SKIN: No rashes or lesions    LABS:                    RADIOLOGY & ADDITIONAL TESTS:    Imaging Personally Reviewed:    Consultant(s) Notes Reviewed:      Care Discussed with Consultants/Other Providers:

## 2025-03-23 NOTE — PROGRESS NOTE ADULT - PROBLEM SELECTOR PLAN 4
-Per vascular  -S/p b/l iliac stent placement on 3/3/25  -Podiatry f/u.  3/9: has cyanosed digits:  defer to vascular  /: vascular following  3/18: digits remains cyanosed and tip gangrenous:  defer to primary team  3/20: vascular following  3/22; seems to be doing  ok   per vascular  3/23: left foot wrapped:  rigth foot digits are gangrenous

## 2025-03-23 NOTE — PROGRESS NOTE ADULT - SUBJECTIVE AND OBJECTIVE BOX
Date of Service: 03-23-25 @ 14:01    Patient is a 74y old  Male who presents with a chief complaint of ESRD requiring HD, uremia (23 Mar 2025 09:56)      Any change in ROS: not doing very well today  :   no sob:  no cough ;  no phlegm      MEDICATIONS  (STANDING):  albuterol/ipratropium for Nebulization 3 milliLiter(s) Nebulizer every 6 hours  aspirin  chewable 81 milliGRAM(s) Oral daily  atorvastatin 40 milliGRAM(s) Oral at bedtime  chlorhexidine 2% Cloths 1 Application(s) Topical daily  clopidogrel Tablet 75 milliGRAM(s) Oral daily  epoetin aleah-epbx (RETACRIT) Injectable 09915 Unit(s) IV Push <User Schedule>  guaiFENesin ER 1200 milliGRAM(s) Oral every 12 hours  hydrOXYzine hydrochloride 25 milliGRAM(s) Oral three times a day  ibuprofen  Tablet. 400 milliGRAM(s) Oral every 12 hours  mupirocin 2% Ointment 1 Application(s) Topical <User Schedule>  oxyCODONE  ER Tablet 30 milliGRAM(s) Oral every 12 hours  pantoprazole    Tablet 40 milliGRAM(s) Oral before breakfast  polyethylene glycol 3350 17 Gram(s) Oral daily  povidone iodine 10% Solution 1 Application(s) Topical two times a day  senna 2 Tablet(s) Oral at bedtime  trimethoprim   80 mG/sulfamethoxazole 400 mG 1 Tablet(s) Oral two times a day    MEDICATIONS  (PRN):  acetaminophen     Tablet .. 650 milliGRAM(s) Oral every 6 hours PRN Temp greater or equal to 38C (100.4F), Mild Pain (1 - 3)  benzocaine/menthol Lozenge 1 Lozenge Oral three times a day PRN Sore Throat  bisacodyl 5 milliGRAM(s) Oral every 12 hours PRN Constipation  HYDROmorphone  Injectable 2 milliGRAM(s) IV Push every 4 hours PRN Severe Pain (7 - 10)  oxyCODONE    IR 10 milliGRAM(s) Oral every 4 hours PRN Moderate Pain (4 - 6)  sodium chloride 0.9% lock flush 10 milliLiter(s) IV Push every 1 hour PRN Pre/post blood products, medications, blood draw, and to maintain line patency    Vital Signs Last 24 Hrs  T(C): 36.8 (23 Mar 2025 07:14), Max: 36.8 (23 Mar 2025 07:14)  T(F): 98.3 (23 Mar 2025 07:14), Max: 98.3 (23 Mar 2025 07:14)  HR: 86 (23 Mar 2025 07:14) (81 - 86)  BP: 149/84 (23 Mar 2025 07:14) (107/65 - 149/84)  BP(mean): --  RR: 18 (23 Mar 2025 07:14) (18 - 18)  SpO2: 94% (23 Mar 2025 07:14) (93% - 95%)    Parameters below as of 23 Mar 2025 07:14  Patient On (Oxygen Delivery Method): room air        I&O's Summary        Physical Exam:   GENERAL: NAD, well-groomed, well-developed  HEENT: VINNY/   Atraumatic, Normocephalic  ENMT: No tonsillar erythema, exudates, or enlargement; Moist mucous membranes, Good dentition, No lesions  NECK: Supple, No JVD, Normal thyroid  CHEST/LUNG: Clear to auscultaion-no wheezing  CVS: Regular rate and rhythm; No murmurs, rubs, or gallops  GI: : Soft, Nontender, Nondistended; Bowel sounds present  NERVOUS SYSTEM:  Alert & Oriented X3  EXTREMITIES:  gangrenous digits   LYMPH: No lymphadenopathy noted  SKIN: No rashes or lesions  ENDOCRINOLOGY: No Thyromegaly  PSYCH: Appropriate    Labs:                CAPILLARY BLOOD GLUCOSE          rad< from: Xray Knee 4 Views, Left (03.06.25 @ 03:21) >  Medial femoral condyle fracture is better seen on same-day CT.    Vascular calcifications are present.    IMPRESSION:    Previously seen medial femoral condyle fracture is better visualized on   CT knee on 2/23/2025.    --- End of Report ---          ANDREA FUENTES MD; Resident Radiologist  This document has been electronically signed.  LORETTA MICHAELS DO; Attending Radiologist  This document has been electronically signed. Mar  6 2025 11:32AM    < end of copied text >              RECENT CULTURES:        RESPIRATORY CULTURES:          Studies  Chest X-RAY  CT SCAN Chest   Venous Dopplers: LE:   CT Abdomen  Others

## 2025-03-23 NOTE — PROGRESS NOTE ADULT - SUBJECTIVE AND OBJECTIVE BOX
[facility]  SABINO Griffin Y. Patel, S. Shah, [facility]  [phone]    Name: BRIAN DEMPSEY  Age: 74y  Gender: Male  MRN: [id number]    Interval History:  Daughter requested to see patient  Pt had vomiting x5 episodes, he says this is unlike him  Expressed concern for "something going on"  Notes reviewed    Antibiotics:  trimethoprim   80 mG/sulfamethoxazole 400 mG 1 Tablet(s) Oral two times a day      Medications:  acetaminophen     Tablet .. 650 milliGRAM(s) Oral every 6 hours PRN  albuterol/ipratropium for Nebulization 3 milliLiter(s) Nebulizer every 6 hours  aspirin  chewable 81 milliGRAM(s) Oral daily  atorvastatin 40 milliGRAM(s) Oral at bedtime  benzocaine/menthol Lozenge 1 Lozenge Oral three times a day PRN  bisacodyl 5 milliGRAM(s) Oral every 12 hours PRN  chlorhexidine 2% Cloths 1 Application(s) Topical daily  clopidogrel Tablet 75 milliGRAM(s) Oral daily  epoetin aleah-epbx (RETACRIT) Injectable 09095 Unit(s) IV Push <User Schedule>  guaiFENesin ER 1200 milliGRAM(s) Oral every 12 hours  HYDROmorphone  Injectable 2 milliGRAM(s) IV Push every 4 hours PRN  hydrOXYzine hydrochloride 25 milliGRAM(s) Oral three times a day  ibuprofen  Tablet. 400 milliGRAM(s) Oral every 12 hours  mupirocin 2% Ointment 1 Application(s) Topical <User Schedule>  oxyCODONE    IR 10 milliGRAM(s) Oral every 4 hours PRN  oxyCODONE  ER Tablet 30 milliGRAM(s) Oral every 12 hours  pantoprazole    Tablet 40 milliGRAM(s) Oral before breakfast  polyethylene glycol 3350 17 Gram(s) Oral daily  povidone iodine 10% Solution 1 Application(s) Topical two times a day  senna 2 Tablet(s) Oral at bedtime  sodium chloride 0.9% lock flush 10 milliLiter(s) IV Push every 1 hour PRN  trimethoprim   80 mG/sulfamethoxazole 400 mG 1 Tablet(s) Oral two times a day      Review of Systems:  A 10-point review of systems was obtained.   Review of systems otherwise negative except as previously noted.    Allergies: No Known Allergies    For details regarding the patient's past medical history, social history, family history, and other miscellaneous elements, please refer the initial infectious diseases consultation and/or the admitting history and physical examination for this admission.    Objective:  Vitals:   T(C): 36.8 (03-23-25 @ 07:14), Max: 36.8 (03-23-25 @ 07:14)  HR: 86 (03-23-25 @ 07:14) (81 - 86)  BP: 149/84 (03-23-25 @ 07:14) (107/65 - 149/84)  RR: 18 (03-23-25 @ 07:14) (18 - 18)  SpO2: 94% (03-23-25 @ 07:14) (93% - 95%)    Physical Examination:  General: no acute distress, on HD  HEENT: normocephalic, atraumatic  Respiratory: breathing comfortably  Cardiovascular: S1 and S2 present  Gastrointestinal: NT, ND, SPC site with dressing  Extremities: ischemic changes LE   Skin: no visible rash    Laboratory Studies:  CBC:   CMP:             Microbiology: reviewed    Culture - Urine (collected 03-15-25 @ 07:08)  Source: Catheterized Catheterized  Final Report (03-16-25 @ 08:38):    <10,000 CFU/mL Normal Urogenital Catie          Radiology: reviewed

## 2025-03-23 NOTE — PROGRESS NOTE ADULT - ASSESSMENT
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 He is well known to me from multiple recent admisions  s/p admissions at [facility] 12/20-12/25/24 and 2/4-2/7 with SONDRA on CKD with associated uremic symptoms.   At the last admission he had expressed strong interest to try to hold off with HD intiation but he has been getting worse clinically at home.  His SONDRA and uremic symptoms significantly improved after the first admission; they improved a to mild extent during the 2nd admission to the point where he was able to be discharged without HD. Since then, however, he has worsened further.   He has been suffering from progressively worsening diffuse pruritus, loss of appetite, and generalized weakness and falls.   His nephrologist and PCP has been speaking with him and his family over the past few days,   The initial plan was that he would present to the [facility] ER Monday 2/17 (ie tomorrow) for HD initiation. Today, however, he fell and sustained trauma to his knee. Given the fall and concern for knee fracture, he presented to the ER today. multiple xrays show no Fractures.      CKD5, uremia, requiring initiation of HD  Rash, seborrheic dermatitis  Pruritis  Anemia  PAD  SP catheter, chronic  Left inferior pole acute on chronic patella Fx    plan  - HD via R subclavian permacath  -3/22:  ptn is in good spirits, says he vomited "spit" this am, but tolerated all meals without vomiting. no abd pain, no undigested food in the vomit. afebrile, no diarrhea, RVP neg. GI consulted.   -3/21-22: ptn feels well.  pain is controlled. still no accepting facility for [facility]  -3/20: ptn states he has severe low back and LLE pain though overall is improving.   -3/19: ptn w tan crusting around SPC , states its painful. started on po Bactrim, renally dosed by ID for cellulitis.   -3/18: LLE paraesthesias are chronic, will d/w CM re dc planning to [facility]    -3/17: ptn states he is lethargic, he has LLE numbness which is new and severe pain at SPC insertion site. this was ID, d/w neuro, renal and vascular. as per ID: no sign of an acute infection recommend CT A/P.   -3/16:  urine cx from 3/15 NTD, zosyn DCed, awaiting dc to [facility], not sure will be able to go to [facility] since there is an insurance coverage conflict. HD as per renal  - 3/15: spoke w ptn's daughter today re denial from [employer] for [facility]. [facility], which [employer] doesnt accept. i recommended to speak  to Cm and to the insurance company to find participating facilities. ptn is stable today. pain and erythema at SP catheter site has resolved today. seen by ID, will cont ZOSYN for now. UCx sent.   - 3/14: suprapubic tube changhed by BETTY, ptn has crusting at the insertion site and pain. will start Abx, urine always has sediment, will sent for cx, start Vanco/ZOsyn. ID consult called. check MRSA/MSSA PCR  -3/13: ptn is doing well. ptn has an accepting rehab facility, now pending AUTH.   -3/12: ptn is no longer constipated. doing well off prn IV DIlaudid. awaiting dc to [facility]  -3/11: ptn has no new c/o other than feeling constipated, lactulose ordered. also in preparation for [facility] will dc prn IV DIlaudid, cont prn OXy IR  -3/10:  ptn is awake, alert, wife at bedside, i instructed them to give rehab choices. also awaiting SPC change to be done by urology. pain is controlled  -3/9: seen by PT, will refer to [facility]. choices to be given in AM. this was d/w ptn, daughter, wife.   -3/8:  ptn didnt want to get PT eval done, will reorder. ptn needs [facility] placement. PMNR consult ordered  - 3/7: ptn is alert , pain is controlled, DC planning d/w ptn and HCP, daughter Yanique  -3/6:  ptn is awake, alert, ecchymotic feet look improved, pain is controlled. DC planning to [facility]  3/4-5 - s/p b/l iliac arteries angioplasty and stent placements on 3/3. today has new ecchymoses in b/l LE, concern for arterial insufficiency. vascular aware.. wound from Left knee immobilizer is dressed and healing. pain is controlled.   LEFT UE AVF placement/scheduling will be on outptn basis as per vascular. dc planning to [facility]  - 3/3: ptn is s/p angiogram RLE : b/l iliac arteries w successful angioplasty and stents. in PACU. awaitng HD.  ptn has a pressure wound from the LLE immobilizer posteriorly proximal to the knee. its been on 2/2 knee fracture, applied by ortho and managed by ptn's wife as per ptn's and wife's request , this was d/w nursing and nurse manager ms Ruiz.. immobilizer should be managed by orthopedics and nursing. will keep it off, only keep on when repositioning for care and then remove. wound care to F/U . this was discussed at length w daughter Yanique and wife Dee Dee.   -3/1-3/2: ptn is smiling, pain free, had HD 2/28 and 3/1, awaiting RLE angiogram 3/3, on Heparin drip, euvolemic  -2/28: ptn is lethargic, awaiting RLE angiogram possible fem-fem bypass hopefully on 3/3 pending OR availability, awaiting HD today    -2/27:  plan for angiogram in am with possible angioplasty, possible stent, possible fem-fem bypass. medically cleared for angiogram and possible surgery, stent, angioplasty. pain is controlled. remains on heparin drip as per vascular. HD as per renal    -2/26:  ptn is sleeping, pain is controlled, he was seen by wound care and vascular attending. planning on angiogram 2/2 severe PAD in LE on CTA. he also spoke to HCP. ptn and HCP in agreement. ptn was started on HEPARIN drip as per vascular recs. RIJ permacath done 2/25    - 2/25: ptn states he is hallucinating, but not at present, his MS is at baseline, his pain is controlled, he still needs prn pain meds when he is moved in the bed or for testing or for HD. awaiting Permacath, AVF creation, LE bypass to be d/w Dr. Swenson and the ptn tomorrow. ptn has cardiology clearance  if he opts for. Ptn has sacral decubiti and posterior thigh and buttock decibiti and b/l heel decubiti. Z flow bootie d/w RN, get wound care consult    -2/24: pain is controlled  if the LLE is immobile and fully extended. doesn't tolerate the knee immobilizer. has a medium condyle and acute on chronic patella fx in LEFT knee. as per vascular ptn will need iliac stent  w a fem-fem bypass, possible axillo-femoral bypass. for this surgery he would need cardiac work up . more pressing surgery is LUE AVF creation,  in IR will arrange for Permacath. for pain control: Motrin 400 mg q12H, Oxy ER 30 mg q12H, Oxy IR 10 for mod pain and dilaudid 2 mg iv for severe pain. still has pruritis though improved, will raise atarax 25 mg to tid. plan of care d/w daughter Norma Persaud , ptn and his wife. Also d/w renal, card, vascular, ACP    -2/23: Daughter Yanique is the HCP, she and the ptn filled out the paperwork. daily plan and findings d/w her and the ptn. MS is at abseline, c/o b/l LE foot pain and Left Knee pain. xrays of left knee: cannot r/o acute on chronic inferior pole patellar Fx. knee immobilizer is on, awaiting ortho consult. doubt needs any intervention awaiting Ct chest today, will add CT Left knee. ptn states itching has recurred, severe pain has recurred. will resume Atatrax ( 25 mg bid) and Oxycodone ER , but at a lower dose 15 mg q12H. cont prn analgesics. HD as per renal, awaiting Vascular consult f/u re CTA A/L &LE and recs. ptn also wants AVF surgery on this admission. Coughing has stopped    - 2/22: ptn is drowsy but arousable, calmer today, answers questions appropriately. he is pain free, he stopped coughing, he denies having pruritis: will DC:  OXY ER, Atarax, Tessalon perles.     -  2/21: ptn is tearful, a bit confused, coughing, recognizes me and family at bedside. GOC d/w daughter and wife. AMS prob delirium 2/2 acute illness +/- opiods, lyrica, atarac. will lower atarak to tid, dc lyrica, cont Oxy 2/2 ptn has severe LE pain. neuro called for eval. Head ct done yesterday w no acute findings. CTA A/P w LE run fof: severe PAD, vascular to follow up on plan fo care. plan for HD tomorrow and next week place on tiw schedule of MWF. will need tunneled catheter prior to DC and will d/w vascular scheduling of AVF. ptn wants all to be done while inptn. daughter wants to be HCP as per ptn's wishes. she was given paperwork to get it signed and witnessed and will present to nursing thereafter. details of findings and complaints and plan of care d/w daughter and wife. spent 60 min. RVP ordered. start mucinex , tessalon perles, duonebs, get CT chest to r/o PNA. pulm called    -  2/20:  ptn had RRT 2/2 AMS and tremors. no SZ, no LOC. noted to have Na 123( hyponatremia), given 500 cc NS. Gabapentin DCed. ptn had been taking gabapentin at home PTA. Pain is controlled. Pruritis resolved, tolerating HD otherwise.     - Pain management:  cont Oxycodone 10 IR q6H,  DIlaudid prn severe pain 2 mg q4H prn.   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 He is well known to me from multiple recent admisions  s/p admissions at [facility] 12/20-12/25/24 and 2/4-2/7 with SONDRA on CKD with associated uremic symptoms.   At the last admission he had expressed strong interest to try to hold off with HD intiation but he has been getting worse clinically at home.  His SONDRA and uremic symptoms significantly improved after the first admission; they improved a to mild extent during the 2nd admission to the point where he was able to be discharged without HD. Since then, however, he has worsened further.   He has been suffering from progressively worsening diffuse pruritus, loss of appetite, and generalized weakness and falls.   His nephrologist and PCP has been speaking with him and his family over the past few days,   The initial plan was that he would present to the [facility] ER Monday 2/17 (ie tomorrow) for HD initiation. Today, however, he fell and sustained trauma to his knee. Given the fall and concern for knee fracture, he presented to the ER today. multiple xrays show no Fractures.      CKD5, uremia, requiring initiation of HD  Rash, seborrheic dermatitis  Pruritis  Anemia  PAD  SP catheter, chronic  Left inferior pole acute on chronic patella Fx    plan  - HD via R subclavian permacath  -3/23:  ptn is in good spirits, says he vomited "spit" this am, but tolerated all meals without vomiting. no abd pain, no undigested food in the vomit. afebrile, no diarrhea, RVP neg. GI consulted.   -3/21-22: ptn feels well.  pain is controlled. still no accepting facility for [facility]  -3/20: ptn states he has severe low back and LLE pain though overall is improving.   -3/19: ptn w tan crusting around SPC , states its painful. started on po Bactrim, renally dosed by ID for cellulitis.   -3/18: LLE paraesthesias are chronic, will d/w CM re dc planning to [facility]    -3/17: ptn states he is lethargic, he has LLE numbness which is new and severe pain at SPC insertion site. this was ID, d/w neuro, renal and vascular. as per ID: no sign of an acute infection recommend CT A/P.   -3/16:  urine cx from 3/15 NTD, zosyn DCed, awaiting dc to [facility], not sure will be able to go to [facility] since there is an insurance coverage conflict. HD as per renal  - 3/15: spoke w ptn's daughter today re denial from [employer] for [facility]. [facility], which [employer] doesnt accept. i recommended to speak  to Cm and to the insurance company to find participating facilities. ptn is stable today. pain and erythema at SP catheter site has resolved today. seen by ID, will cont ZOSYN for now. UCx sent.   - 3/14: suprapubic tube changhed by BETTY, ptn has crusting at the insertion site and pain. will start Abx, urine always has sediment, will sent for cx, start Vanco/ZOsyn. ID consult called. check MRSA/MSSA PCR  -3/13: ptn is doing well. ptn has an accepting rehab facility, now pending AUTH.   -3/12: ptn is no longer constipated. doing well off prn IV DIlaudid. awaiting dc to [facility]  -3/11: ptn has no new c/o other than feeling constipated, lactulose ordered. also in preparation for [facility] will dc prn IV DIlaudid, cont prn OXy IR  -3/10:  ptn is awake, alert, wife at bedside, i instructed them to give rehab choices. also awaiting SPC change to be done by urology. pain is controlled  -3/9: seen by PT, will refer to [facility]. choices to be given in AM. this was d/w ptn, daughter, wife.   -3/8:  ptn didnt want to get PT eval done, will reorder. ptn needs [facility] placement. PMNR consult ordered  - 3/7: ptn is alert , pain is controlled, DC planning d/w ptn and HCP, daughter Yanique  -3/6:  ptn is awake, alert, ecchymotic feet look improved, pain is controlled. DC planning to [facility]  3/4-5 - s/p b/l iliac arteries angioplasty and stent placements on 3/3. today has new ecchymoses in b/l LE, concern for arterial insufficiency. vascular aware.. wound from Left knee immobilizer is dressed and healing. pain is controlled.   LEFT UE AVF placement/scheduling will be on outptn basis as per vascular. dc planning to [facility]  - 3/3: ptn is s/p angiogram RLE : b/l iliac arteries w successful angioplasty and stents. in PACU. awaitng HD.  ptn has a pressure wound from the LLE immobilizer posteriorly proximal to the knee. its been on 2/2 knee fracture, applied by ortho and managed by ptn's wife as per ptn's and wife's request , this was d/w nursing and nurse manager ms Ruiz.. immobilizer should be managed by orthopedics and nursing. will keep it off, only keep on when repositioning for care and then remove. wound care to F/U . this was discussed at length w daughter Yanique and wife Dee Dee.   -3/1-3/2: ptn is smiling, pain free, had HD 2/28 and 3/1, awaiting RLE angiogram 3/3, on Heparin drip, euvolemic  -2/28: ptn is lethargic, awaiting RLE angiogram possible fem-fem bypass hopefully on 3/3 pending OR availability, awaiting HD today    -2/27:  plan for angiogram in am with possible angioplasty, possible stent, possible fem-fem bypass. medically cleared for angiogram and possible surgery, stent, angioplasty. pain is controlled. remains on heparin drip as per vascular. HD as per renal    -2/26:  ptn is sleeping, pain is controlled, he was seen by wound care and vascular attending. planning on angiogram 2/2 severe PAD in LE on CTA. he also spoke to HCP. ptn and HCP in agreement. ptn was started on HEPARIN drip as per vascular recs. RIJ permacath done 2/25    - 2/25: ptn states he is hallucinating, but not at present, his MS is at baseline, his pain is controlled, he still needs prn pain meds when he is moved in the bed or for testing or for HD. awaiting Permacath, AVF creation, LE bypass to be d/w Dr. Swenson and the ptn tomorrow. ptn has cardiology clearance  if he opts for. Ptn has sacral decubiti and posterior thigh and buttock decibiti and b/l heel decubiti. Z flow bootie d/w RN, get wound care consult    -2/24: pain is controlled  if the LLE is immobile and fully extended. doesn't tolerate the knee immobilizer. has a medium condyle and acute on chronic patella fx in LEFT knee. as per vascular ptn will need iliac stent  w a fem-fem bypass, possible axillo-femoral bypass. for this surgery he would need cardiac work up . more pressing surgery is LUE AVF creation,  in IR will arrange for Permacath. for pain control: Motrin 400 mg q12H, Oxy ER 30 mg q12H, Oxy IR 10 for mod pain and dilaudid 2 mg iv for severe pain. still has pruritis though improved, will raise atarax 25 mg to tid. plan of care d/w daughter Norma Persaud , ptn and his wife. Also d/w renal, card, vascular, ACP    -2/23: Daughter Yanique is the HCP, she and the ptn filled out the paperwork. daily plan and findings d/w her and the ptn. MS is at abseline, c/o b/l LE foot pain and Left Knee pain. xrays of left knee: cannot r/o acute on chronic inferior pole patellar Fx. knee immobilizer is on, awaiting ortho consult. doubt needs any intervention awaiting Ct chest today, will add CT Left knee. ptn states itching has recurred, severe pain has recurred. will resume Atatrax ( 25 mg bid) and Oxycodone ER , but at a lower dose 15 mg q12H. cont prn analgesics. HD as per renal, awaiting Vascular consult f/u re CTA A/L &LE and recs. ptn also wants AVF surgery on this admission. Coughing has stopped    - 2/22: ptn is drowsy but arousable, calmer today, answers questions appropriately. he is pain free, he stopped coughing, he denies having pruritis: will DC:  OXY ER, Atarax, Tessalon perles.     -  2/21: ptn is tearful, a bit confused, coughing, recognizes me and family at bedside. GOC d/w daughter and wife. AMS prob delirium 2/2 acute illness +/- opiods, lyrica, atarac. will lower atarak to tid, dc lyrica, cont Oxy 2/2 ptn has severe LE pain. neuro called for eval. Head ct done yesterday w no acute findings. CTA A/P w LE run fof: severe PAD, vascular to follow up on plan fo care. plan for HD tomorrow and next week place on tiw schedule of MWF. will need tunneled catheter prior to DC and will d/w vascular scheduling of AVF. ptn wants all to be done while inptn. daughter wants to be HCP as per ptn's wishes. she was given paperwork to get it signed and witnessed and will present to nursing thereafter. details of findings and complaints and plan of care d/w daughter and wife. spent 60 min. RVP ordered. start mucinex , tessalon perles, duonebs, get CT chest to r/o PNA. pulm called    -  2/20:  ptn had RRT 2/2 AMS and tremors. no SZ, no LOC. noted to have Na 123( hyponatremia), given 500 cc NS. Gabapentin DCed. ptn had been taking gabapentin at home PTA. Pain is controlled. Pruritis resolved, tolerating HD otherwise.     - Pain management:  cont Oxycodone 10 IR q6H,  DIlaudid prn severe pain 2 mg q4H prn.   - cont outptn meds  - DVT ppx w HSC

## 2025-03-23 NOTE — PROGRESS NOTE ADULT - ASSESSMENT
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  came in after fall and for HD.     2/20 CTH neg   \Na 123 --> now 133   A1c 5.7   TSH WNL 3.46   o/e 2/21 AAOx2, uppers 4/5, lowers limited .  2/23; family bedside upset that he has pain in leg after he was moved.  patient going for imaging now.   s/p R IJ permacath by IR 2/25 2/27 AAOx3   sopoke with family bedisde 3/17 wife says mental status okay, sometimes up and down but good   3/18 was told he has "new neuropathy" spoke iwshahida patient and wife at bedside. states its the same from before not a new issue.    3/21 mental status stable. c/o pubic cathter   3/23 c/o nausea today   \  Imrpssion  1) AMS, waxing and waing , likely multifactorial from infection, metabolic/electrolyte derrangements/ delerium / medication effect , ureemia which is imporving   2) polio  3) fall 2/2 weakness  4) hyponatremia to 123 2/20  improved 133 -->136        - AVF outpatient     f/u vascular; no vascluar options ; f/u with Messi from [facility] to schedule outpatient procedure   - on DAPT   - was getting oxycodone ER 30mg BID and oxy PRN IR i10 and dilauded PRN ;   consider gabapentin 200mg TID or lyrica 50mg BID fo nueorpathy if no objection ; patient states he still has pain but its better.  vascular has seen patient.  poor prognosis of LLE   - f/u psych   - limit sedating meds   - continue to monitor and correct metabolic derrangements .  - delerium precuations.   - frequent re orientation  - check b12, RPR, ammonia level if never checked    - PT/OT   - check FS, glucose control <180  - GI/DVT ppx  - Thank you for allowing me to participate in the care of this patient. Call with questions.   dc planning [facility] needs auth ; Freddie mojica   spoke to patient and wife in room 3/23 c/o nausea.   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  came in after fall and for HD.     2/20 CTH neg   \Na 123 --> now 133   A1c 5.7   TSH WNL 3.46   o/e 2/21 AAOx2, uppers 4/5, lowers limited .  2/23; family bedside upset that he has pain in leg after he was moved.  patient going for imaging now.   s/p R IJ permacath by IR 2/25 2/27 AAOx3   sopoke with family bedisde 3/17 wife says mental status okay, sometimes up and down but good   3/18 was told he has "new neuropathy" spoke iwshahida patient and wife at bedside. states its the same from before not a new issue.    3/21 mental status stable. c/o pubic cathter   3/23 c/o nausea today   \  Imrpssion  1) AMS, waxing and waing , likely multifactorial from infection, metabolic/electrolyte derrangements/ delerium / medication effect , ureemia which is imporving   2) polio  3) fall 2/2 weakness  4) hyponatremia to 123 2/20  improved 133 -->136        - AVF outpatient     f/u vascular; no vascluar options ; f/u with Messi from vascular to schedule outpatient procedure   - on DAPT   - was getting oxycodone ER 30mg BID and oxy PRN IR i10 and dilauded PRN ;   consider gabapentin 200mg TID or lyrica 50mg BID fo nueorpathy if no objection ; patient states he still has pain but its better.  vascular has seen patient.  poor prognosis of LLE   - f/u psych   - limit sedating meds   - continue to monitor and correct metabolic derrangements .  - delerium precuations.   - frequent re orientation  - check b12, RPR, ammonia level if never checked    - PT/OT   - check FS, glucose control <180  - GI/DVT ppx  - Thank you for allowing me to participate in the care of this patient. Call with questions.   dc planning [facility] needs auth ; Freddie mojica   spoke to patient and wife in room 3/23 c/o nausea. spoke with nursing   Paul Limon MD  Vascular Neurology  Office: [phone]

## 2025-03-24 NOTE — PROGRESS NOTE ADULT - PROBLEM SELECTOR PLAN 1
pulm wise seems pretty good:  remain son room air:   initial sob has resolved:  adv to do  incentive spirometry  :  always in bed:  high risk for pneumonia:  dw pt    3/22: seems to be doing ok :  on room air:   ct scan chest reviewed   seems to be comfortable'    3/23: rpt cxr today  : on room air;   no sob:  no wheezing;   last chest xray three weeks ago     3/24/ ;he had multiple vomitings yesterday  : repeat cxr yesterday was  normal : did not aspirate:  defer to primary care

## 2025-03-24 NOTE — PROGRESS NOTE ADULT - SUBJECTIVE AND OBJECTIVE BOX
Neurology      S; patient seen. no neuro changes, at HD       Medications: MEDICATIONS  (STANDING):  albuterol/ipratropium for Nebulization 3 milliLiter(s) Nebulizer every 6 hours  aspirin  chewable 81 milliGRAM(s) Oral daily  atorvastatin 40 milliGRAM(s) Oral at bedtime  chlorhexidine 2% Cloths 1 Application(s) Topical daily  clopidogrel Tablet 75 milliGRAM(s) Oral daily  epoetin aleah-epbx (RETACRIT) Injectable 02474 Unit(s) IV Push <User Schedule>  guaiFENesin ER 1200 milliGRAM(s) Oral every 12 hours  hydrOXYzine hydrochloride 25 milliGRAM(s) Oral three times a day  ibuprofen  Tablet. 400 milliGRAM(s) Oral every 12 hours  mupirocin 2% Ointment 1 Application(s) Topical <User Schedule>  oxyCODONE  ER Tablet 30 milliGRAM(s) Oral every 12 hours  pantoprazole    Tablet 40 milliGRAM(s) Oral before breakfast  polyethylene glycol 3350 17 Gram(s) Oral daily  povidone iodine 10% Solution 1 Application(s) Topical two times a day  senna 2 Tablet(s) Oral at bedtime  trimethoprim   80 mG/sulfamethoxazole 400 mG 1 Tablet(s) Oral two times a day    MEDICATIONS  (PRN):  acetaminophen     Tablet .. 650 milliGRAM(s) Oral every 6 hours PRN Temp greater or equal to 38C (100.4F), Mild Pain (1 - 3)  benzocaine/menthol Lozenge 1 Lozenge Oral three times a day PRN Sore Throat  bisacodyl 5 milliGRAM(s) Oral every 12 hours PRN Constipation  HYDROmorphone  Injectable 2 milliGRAM(s) IV Push every 4 hours PRN Severe Pain (7 - 10)  oxyCODONE    IR 10 milliGRAM(s) Oral every 4 hours PRN Moderate Pain (4 - 6)  sodium chloride 0.9% lock flush 10 milliLiter(s) IV Push every 1 hour PRN Pre/post blood products, medications, blood draw, and to maintain line patency       Vitals:  Vital Signs Last 24 Hrs  T(C): 36.2 (24 Mar 2025 07:54), Max: 37.1 (24 Mar 2025 00:31)  T(F): 97.2 (24 Mar 2025 07:54), Max: 98.7 (24 Mar 2025 00:31)  HR: 80 (24 Mar 2025 07:54) (78 - 86)  BP: 134/67 (24 Mar 2025 07:54) (134/67 - 151/91)  BP(mean): --  RR: 18 (24 Mar 2025 07:54) (18 - 18)  SpO2: 94% (24 Mar 2025 07:54) (94% - 97%)    Parameters below as of 24 Mar 2025 07:54  Patient On (Oxygen Delivery Method): room air                        General Appearance: Appropriately dressed and in no acute distress       Head: Normocephalic, atraumatic and no dysmorphic features  Ear, Nose, and Throat: Moist mucous membranes  CVS: S1S2+  Resp: No SOB, no wheeze or rhonchi  GI: soft NT/ND  Extremities: LE PVD : dusky toes noted   Skin: + decub      Neurological Exam:  Mental Status: Awake, alert and oriented x 2.  Able to follow simple and complex verbal commands. Able to name and repeat. fluent speech. No obvious aphasia or dysarthria noted.   Cranial Nerves: PERRL, EOMI, VFFC, sensation V1-V3 intact,  no obvious facial asymmetry, equal elevation of palate, scm/trap 5/5, tongue is midline on protrusion. no obvious papilledema on fundoscopic exam. hearing is grossly intact.   Motor: Normal bulk, tone and strength throughout uppers 4/ lowers 0-/1 5   Sensation: Intact to light touch and pinprick throughout.     Coordination: No dysmetria on FNF   Gait: deferred, wheelchair bound     Data/Labs/Imaging which I personally reviewed.         LABS:    LABS:                          9.9    10.94 )-----------( 245      ( 24 Mar 2025 07:13 )             33.4     03-24    129[L]  |  93[L]  |  34[H]  ----------------------------<  100[H]  4.6   |  21[L]  |  4.04[H]    Ca    7.5[L]      24 Mar 2025 07:13          Urinalysis Basic - ( 24 Mar 2025 07:13 )    Color: x / Appearance: x / SG: x / pH: x  Gluc: 100 mg/dL / Ketone: x  / Bili: x / Urobili: x   Blood: x / Protein: x / Nitrite: x   Leuk Esterase: x / RBC: x / WBC x   Sq Epi: x / Non Sq Epi: x / Bacteria: x                    < from: CT Head No Cont (02.20.25 @ 18:47) >    ACC: [id number] EXAM:  CT BRAIN   ORDERED BY: GUY DE LA CRUZ     PROCEDURE DATE:  02/20/2025          INTERPRETATION:  CLINICAL INDICATION: Altered mental status    TECHNIQUE: Axial CT scanning of the brain was obtained from the skull   base to the vertex without the administration of intravenous contrast.   Reformatted coronal and sagittal images were subsequently obtained and   reviewed.    COMPARISON: None    FINDINGS:  There is no CT evidence of acute transcortical infarct. Age-related   involutional changes and chronic microvascular ischemic changes.    There is no hydrocephalus, mass effect, or acute intracranial hemorrhage.   No extra-axial collection. Basal cisterns are patent.    The visualized paranasal sinuses and mastoid air cells are clear.    The calvarium is intact.    IMPRESSION:  No evidence of acute transcortical infarct, acute intracranial   hemorrhage, or mass effect.    --- End of Report ---            CORTNEY REARDON MD; Attending Radiologist  This document has been electronically signed. Feb 20 2025  7:07PM    < end of copied text >

## 2025-03-24 NOTE — PROGRESS NOTE ADULT - SUBJECTIVE AND OBJECTIVE BOX
[facility]  SABINO Griffin Y. Patel, S. Shah, G. Casimir  [phone]  ([phone] - weekdays after 5pm and weekends)    Name: BRIAN DEMPSEY  Age/Gender: 74y Male  MRN: [id number]    Interval History:  Patient seen and examined this morning at HD.   Resting comfortably, noted with intermittent nausea.  Notes reviewed  No concerning overnight events  Afebrile   Allergies: No Known Allergies      Objective:  Vitals:   T(F): 97.5 (03-24-25 @ 10:54), Max: 98.7 (03-24-25 @ 00:31)  HR: 88 (03-24-25 @ 10:54) (78 - 88)  BP: 124/74 (03-24-25 @ 10:54) (124/74 - 151/91)  RR: 18 (03-24-25 @ 10:54) (18 - 18)  SpO2: 96% (03-24-25 @ 10:54) (94% - 97%)  Physical Examination:  General: no acute distress, on HD  HEENT: normocephalic, atraumatic  Respiratory: breathing comfortably  Cardiovascular: S1 and S2 present  Gastrointestinal: NT, ND, +SPC  Extremities: ischemic changes LE   Skin: no visible rash    Laboratory Studies:  CBC:                       9.9    10.94 )-----------( 245      ( 24 Mar 2025 07:13 )             33.4     WBC Trend:  10.94 03-24-25 @ 07:13    CMP: 03-24    129[L]  |  93[L]  |  34[H]  ----------------------------<  100[H]  4.6   |  21[L]  |  4.04[H]    Ca    7.5[L]      24 Mar 2025 07:13      Creatinine: 4.04 mg/dL (03-24-25 @ 07:13)    Microbiology: reviewed   Culture - Urine (collected 03-15-25 @ 07:08)  Source: Catheterized Catheterized  Final Report (03-16-25 @ 08:38):    <10,000 CFU/mL Normal Urogenital Catie    03-23-25 @ 14:18 SARS-CoV-2 NotDetec/Influenza A NotDetec/Influenza B NotDetec/RSV NotDetec    Radiology: reviewed     Medications:  acetaminophen     Tablet .. 650 milliGRAM(s) Oral every 6 hours PRN  albuterol/ipratropium for Nebulization 3 milliLiter(s) Nebulizer every 6 hours  aspirin  chewable 81 milliGRAM(s) Oral daily  atorvastatin 40 milliGRAM(s) Oral at bedtime  benzocaine/menthol Lozenge 1 Lozenge Oral three times a day PRN  bisacodyl 5 milliGRAM(s) Oral every 12 hours PRN  chlorhexidine 2% Cloths 1 Application(s) Topical daily  clopidogrel Tablet 75 milliGRAM(s) Oral daily  epoetin laeah-epbx (RETACRIT) Injectable 73302 Unit(s) IV Push <User Schedule>  guaiFENesin ER 1200 milliGRAM(s) Oral every 12 hours  HYDROmorphone  Injectable 2 milliGRAM(s) IV Push every 4 hours PRN  hydrOXYzine hydrochloride 25 milliGRAM(s) Oral three times a day  ibuprofen  Tablet. 400 milliGRAM(s) Oral every 12 hours  mupirocin 2% Ointment 1 Application(s) Topical <User Schedule>  oxyCODONE    IR 10 milliGRAM(s) Oral every 4 hours PRN  oxyCODONE  ER Tablet 30 milliGRAM(s) Oral every 12 hours  pantoprazole    Tablet 40 milliGRAM(s) Oral before breakfast  polyethylene glycol 3350 17 Gram(s) Oral daily  povidone iodine 10% Solution 1 Application(s) Topical two times a day  senna 2 Tablet(s) Oral at bedtime  sodium chloride 0.9% lock flush 10 milliLiter(s) IV Push every 1 hour PRN  trimethoprim   80 mG/sulfamethoxazole 400 mG 1 Tablet(s) Oral two times a day    Current Antimicrobials:  trimethoprim   80 mG/sulfamethoxazole 400 mG 1 Tablet(s) Oral two times a day    Prior/Completed Antimicrobials:  piperacillin/tazobactam IVPB.  piperacillin/tazobactam IVPB.  piperacillin/tazobactam IVPB.-  piperacillin/tazobactam IVPB.-  trimethoprim  160 mG/sulfamethoxazole 800 mG  vancomycin  IVPB  vancomycin  IVPB

## 2025-03-24 NOTE — PROGRESS NOTE ADULT - ASSESSMENT
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  came in after fall and for HD.     2/20 CTH neg   \Na 123 --> now 133   A1c 5.7   TSH WNL 3.46   o/e 2/21 AAOx2, uppers 4/5, lowers limited .  2/23; family bedside upset that he has pain in leg after he was moved.  patient going for imaging now.   s/p R IJ permacath by IR 2/25 2/27 AAOx3   sopoke with family bedisde 3/17 wife says mental status okay, sometimes up and down but good   3/18 was told he has "new neuropathy" spoke iwshahida patient and wife at bedside. states its the same from before not a new issue.    3/21 mental status stable. c/o pubic cathter   3/23 c/o nausea today   \  Imrpssion  1) AMS, waxing and waing , likely multifactorial from infection, metabolic/electrolyte derrangements/ delerium / medication effect , ureemia which is imporving   2) polio  3) fall 2/2 weakness  4) hyponatremia to 123 2/20  improved 133 -->136;   back down to 129     - Na 129 on most recent blood work.  slow correcttion  ; f/u renal    - AVF outpatient     f/u vascular; no vascluar options ; f/u with Messi from vasculare to schedule outpatient procedure   - on DAPT   - was getting oxycodone ER 30mg BID and oxy PRN IR i10 and dilauded PRN ;   consider gabapentin 200mg TID or lyrica 50mg BID fo nueorpathy if no objection ; patient states he still has pain but its better.  vascular has seen patient.  poor prognosis of LLE   - f/u psych   - limit sedating meds   - continue to monitor and correct metabolic derrangements .  - delerium precuations.   - frequent re orientation  - check b12, RPR, ammonia level if never checked    - PT/OT   - check FS, glucose control <180  - GI/DVT ppx  - Thank you for allowing me to participate in the care of this patient. Call with questions.   dc planning       Paul Limon MD  Vascular Neurology  Office: [phone]

## 2025-03-24 NOTE — CONSULT NOTE ADULT - ASSESSMENT
74 year old man with SONDRA on CKD requiring HD, now with nausea    1. ESRD on HD  per renal    2. Nausea/dyspepsia. ddx GERD, but with RLQ TTP, also could be side effect of opiods. He is reporting bowel function  -check CT  -PPI trial  -seems to be improving

## 2025-03-24 NOTE — PROGRESS NOTE ADULT - PROBLEM SELECTOR PLAN 4
-Per vascular  -S/p b/l iliac stent placement on 3/3/25  -Podiatry f/u.  3/9: has cyanosed digits:  defer to vascular  /: vascular following  3/18: digits remains cyanosed and tip gangrenous:  defer to primary team  3/20: vascular following  3/22; seems to be doing  ok   per vascular  3/23: left foot wrapped:  rigth foot digits are gangrenous  3/24: the rigth leg looks better:  the left leg is wrapped:

## 2025-03-24 NOTE — PROGRESS NOTE ADULT - SUBJECTIVE AND OBJECTIVE BOX
Subjective: Patient seen and examined. No new events except as noted.   Pt seen at HD    REVIEW OF SYSTEMS:  unable to obtain    MEDICATIONS:  MEDICATIONS  (STANDING):  albuterol/ipratropium for Nebulization 3 milliLiter(s) Nebulizer every 6 hours  aspirin  chewable 81 milliGRAM(s) Oral daily  atorvastatin 40 milliGRAM(s) Oral at bedtime  chlorhexidine 2% Cloths 1 Application(s) Topical daily  clopidogrel Tablet 75 milliGRAM(s) Oral daily  epoetin aleah-epbx (RETACRIT) Injectable 73458 Unit(s) IV Push <User Schedule>  guaiFENesin ER 1200 milliGRAM(s) Oral every 12 hours  hydrOXYzine hydrochloride 25 milliGRAM(s) Oral three times a day  ibuprofen  Tablet. 400 milliGRAM(s) Oral every 12 hours  mupirocin 2% Ointment 1 Application(s) Topical <User Schedule>  oxyCODONE  ER Tablet 30 milliGRAM(s) Oral every 12 hours  pantoprazole    Tablet 40 milliGRAM(s) Oral before breakfast  polyethylene glycol 3350 17 Gram(s) Oral daily  povidone iodine 10% Solution 1 Application(s) Topical two times a day  senna 2 Tablet(s) Oral at bedtime  trimethoprim   80 mG/sulfamethoxazole 400 mG 1 Tablet(s) Oral two times a day      PHYSICAL EXAM:  T(C): 36.2 (03-24-25 @ 07:54), Max: 37.1 (03-24-25 @ 00:31)  HR: 80 (03-24-25 @ 07:54) (78 - 86)  BP: 134/67 (03-24-25 @ 07:54) (134/67 - 151/91)  RR: 18 (03-24-25 @ 07:54) (18 - 18)  SpO2: 94% (03-24-25 @ 07:54) (94% - 97%)  Wt(kg): --  I&O's Summary        Appearance: NAD  HEENT:  Dry oral mucosa, PERRL, EOMI	  Lymphatic: No lymphadenopathy  Cardiovascular: Normal S1 S2, No JVD, No murmurs, No edema  Respiratory: Lungs clear to auscultation	  Psychiatry: A & O x 3, Mood & affect appropriate  Skin: No rashes, No ecchymoses, No cyanosis	  Neurologic: Non-focal  GI/Abd: Soft, obese, nontender. Dressing to [facility] C/D/I. Suprapubic catheter in place  Ext: Palp femoral pulses bilat. BLE atrophic, minimal dorsi/plantarflexion bilaterally. Sensation grossly intact. LLE edematous compared to R, erythema to right toes/forefoot. mottling of right toes/forefoot/heel  LLE:  small superficial abrasion, +edema and +ecchymosis over L knee  +TTP over L knee, no TTP along remainder of extremity; compartments soft  Limited ROM at knee 2/2 pain  No gross varus/valgus laxity, but assessment limited 2/2 pain  Motor: TA/EHL/GS/FHL intact  Sensory: DP/SP/Tib/Jordan/Saph SILT  +DP pulse (symmetric relative to contralateral side), WWP   pressure wound from the E immobilizer posteriorly proximal to the knee.  Vascular: Peripheral pulses palpable 2+ bilaterally        LABS:    CARDIAC MARKERS:                                9.9    10.94 )-----------( 245      ( 24 Mar 2025 07:13 )             33.4     03-24    129[L]  |  93[L]  |  34[H]  ----------------------------<  100[H]  4.6   |  21[L]  |  4.04[H]    Ca    7.5[L]      24 Mar 2025 07:13      proBNP:   Lipid Profile:   HgA1c:   TSH:             TELEMETRY: 	    ECG:  	  RADIOLOGY:   DIAGNOSTIC TESTING:  [ ] Echocardiogram:  [ ]  Catheterization:  [ ] Stress Test:    OTHER:

## 2025-03-24 NOTE — PROGRESS NOTE ADULT - ASSESSMENT
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 He is well known to me from multiple recent admisions  s/p admissions at [facility] 12/20-12/25/24 and 2/4-2/7 with SONDRA on CKD with associated uremic symptoms.   At the last admission he had expressed strong interest to try to hold off with HD intiation but he has been getting worse clinically at home.  His SONDRA and uremic symptoms significantly improved after the first admission; they improved a to mild extent during the 2nd admission to the point where he was able to be discharged without HD. Since then, however, he has worsened further.   He has been suffering from progressively worsening diffuse pruritus, loss of appetite, and generalized weakness and falls.   His nephrologist and PCP has been speaking with him and his family over the past few days,   The initial plan was that he would present to the [facility] ER Monday 2/17 (ie tomorrow) for HD initiation. Today, however, he fell and sustained trauma to his knee. Given the fall and concern for knee fracture, he presented to the ER today. multiple xrays show no Fractures.      CKD5, uremia, requiring initiation of HD  Rash, seborrheic dermatitis  Pruritis  Anemia  PAD  SP catheter, chronic  Left inferior pole acute on chronic patella Fx    plan  - HD via R subclavian permacath  -3/24: ptn  was seen by GI for N/V, its not clear the etiology, will obtain Ct C/A/P. ptn states he is coughing up green mucus as well  -3/23:  ptn is in good spirits, says he vomited "spit" this am, but tolerated all meals without vomiting. no abd pain, no undigested food in the vomit. afebrile, no diarrhea, RVP neg. GI consulted.   -3/21-22: ptn feels well.  pain is controlled. still no accepting facility for NYDIA  -3/20: ptn states he has severe low back and LLE pain though overall is improving.   -3/19: ptn w tan crusting around SPC , states its painful. started on po Bactrim, renally dosed by ID for cellulitis.   -3/18: LLE paraesthesias are chronic, will d/w CM re dc planning to [facility]    -3/17: ptn states he is lethargic, he has LLE numbness which is new and severe pain at SPC insertion site. this was ID, d/w neuro, renal and vascular. as per ID: no sign of an acute infection recommend CT A/P.   -3/16:  urine cx from 3/15 NTD, zosyn DCed, awaiting dc to [facility], not sure will be able to go to [facility] since there is an insurance coverage conflict. HD as per renal  - 3/15: spoke w ptn's daughter today re denial from [place] for [facility]. ptn has [employer] medicare advantage plan, which [facility] doesnt accept. i recommended to speak  to Cm and to the insurance company to find participating facilities. ptn is stable today. pain and erythema at SP catheter site has resolved today. seen by ID, will cont ZOSYN for now. UCx sent.   - 3/14: suprapubic tube changhed by BETTY, ptn has crusting at the insertion site and pain. will start Abx, urine always has sediment, will sent for cx, start Vanco/ZOsyn. ID consult called. check MRSA/MSSA PCR  -3/13: ptn is doing well. ptn has an accepting rehab facility, now pending AUTH.   -3/12: ptn is no longer constipated. doing well off prn IV DIlaudid. awaiting dc to [facility]  -3/11: ptn has no new c/o other than feeling constipated, lactulose ordered. also in preparation for [facility] will dc prn IV DIlaudid, cont prn OXy IR  -3/10:  ptn is awake, alert, wife at bedside, i instructed them to give rehab choices. also awaiting SPC change to be done by urology. pain is controlled  -3/9: seen by PT, will refer to [facility]. choices to be given in AM. this was d/w ptn, daughter, wife.   -3/8:  ptn didnt want to get PT eval done, will reorder. ptn needs [facility]DIA placement. PMNR consult ordered  - 3/7: ptn is alert , pain is controlled, DC planning d/w ptn and HCP, daughter Yanique  -3/6:  ptn is awake, alert, ecchymotic feet look improved, pain is controlled. DC planning to [facility]  3/4-5 - s/p b/l iliac arteries angioplasty and stent placements on 3/3. today has new ecchymoses in b/l LE, concern for arterial insufficiency. vascular aware.. wound from Left knee immobilizer is dressed and healing. pain is controlled.   LEFT UE AVF placement/scheduling will be on outptn basis as per vascular. dc planning to [facility]  - 3/3: ptn is s/p angiogram RLE : b/l iliac arteries w successful angioplasty and stents. in PACU. awaitng HD.  ptn has a pressure wound from the LLE immobilizer posteriorly proximal to the knee. its been on 2/2 knee fracture, applied by ortho and managed by ptn's wife as per ptn's and wife's request , this was d/w nursing and nurse manager ms Bobmykel.. immobilizer should be managed by orthopedics and nursing. will keep it off, only keep on when repositioning for care and then remove. wound care to F/U . this was discussed at length w daughter Yanique and wife Dee Dee.   -3/1-3/2: ptn is smiling, pain free, had HD 2/28 and 3/1, awaiting RLE angiogram 3/3, on Heparin drip, euvolemic  -2/28: ptn is lethargic, awaiting RLE angiogram possible fem-fem bypass hopefully on 3/3 pending OR availability, awaiting HD today    -2/27:  plan for angiogram in am with possible angioplasty, possible stent, possible fem-fem bypass. medically cleared for angiogram and possible surgery, stent, angioplasty. pain is controlled. remains on heparin drip as per vascular. HD as per renal    -2/26:  ptn is sleeping, pain is controlled, he was seen by wound care and vascular attending. planning on angiogram 2/2 severe PAD in LE on CTA. he also spoke to HCP. ptn and HCP in agreement. ptn was started on HEPARIN drip as per vascular recs. RIJ permacath done 2/25    - 2/25: ptn states he is hallucinating, but not at present, his MS is at baseline, his pain is controlled, he still needs prn pain meds when he is moved in the bed or for testing or for HD. awaiting Permacath, AVF creation, LE bypass to be d/w Dr. Swenson and the ptn tomorrow. ptn has cardiology clearance  if he opts for. Ptn has sacral decubiti and posterior thigh and buttock decibiti and b/l heel decubiti. Z flow bootie d/w RN, get wound care consult    -2/24: pain is controlled  if the LLE is immobile and fully extended. doesn't tolerate the knee immobilizer. has a medium condyle and acute on chronic patella fx in LEFT knee. as per vascular ptn will need iliac stent  w a fem-fem bypass, possible axillo-femoral bypass. for this surgery he would need cardiac work up . more pressing surgery is LUE AVF creation,  in IR will arrange for Permacath. for pain control: Motrin 400 mg q12H, Oxy ER 30 mg q12H, Oxy IR 10 for mod pain and dilaudid 2 mg iv for severe pain. still has pruritis though improved, will raise atarax 25 mg to tid. plan of care d/w daughter Norma Persaud , ptn and his wife. Also d/w renal, card, vascular, ACP    -2/23: Daughter Yanique is the HCP, she and the ptn filled out the paperwork. daily plan and findings d/w her and the ptn. MS is at abseline, c/o b/l LE foot pain and Left Knee pain. xrays of left knee: cannot r/o acute on chronic inferior pole patellar Fx. knee immobilizer is on, awaiting ortho consult. doubt needs any intervention awaiting Ct chest today, will add CT Left knee. ptn states itching has recurred, severe pain has recurred. will resume Atatrax ( 25 mg bid) and Oxycodone ER , but at a lower dose 15 mg q12H. cont prn analgesics. HD as per renal, awaiting Vascular consult f/u re CTA A/L &LE and recs. ptn also wants AVF surgery on this admission. Coughing has stopped    - 2/22: ptn is drowsy but arousable, calmer today, answers questions appropriately. he is pain free, he stopped coughing, he denies having pruritis: will DC:  OXY ER, Atarax, Tessalon perles.     -  2/21: ptn is tearful, a bit confused, coughing, recognizes me and family at bedside. GOC d/w daughter and wife. AMS prob delirium 2/2 acute illness +/- opiods, lyrica, atarac. will lower atarak to tid, dc lyrica, cont Oxy 2/2 ptn has severe LE pain. neuro called for eval. Head ct done yesterday w no acute findings. CTA A/P w LE run fof: severe PAD, vascular to follow up on plan fo care. plan for HD tomorrow and next week place on tiw schedule of MWF. will need tunneled catheter prior to DC and will d/w vascular scheduling of AVF. ptn wants all to be done while inptn. daughter wants to be HCP as per ptn's wishes. she was given paperwork to get it signed and witnessed and will present to nursing thereafter. details of findings and complaints and plan of care d/w daughter and wife. spent 60 min. RVP ordered. start mucinex , tessalon perles, duonebs, get CT chest to r/o PNA. pulm called    -  2/20:  ptn had RRT 2/2 AMS and tremors. no SZ, no LOC. noted to have Na 123( hyponatremia), given 500 cc NS. Gabapentin DCed. ptn had been taking gabapentin at home PTA. Pain is controlled. Pruritis resolved, tolerating HD otherwise.     - Pain management:  cont Oxycodone 10 IR q6H,  DIlaudid prn severe pain 2 mg q4H prn.   - cont outptn meds  - DVT ppx w [facility]SC

## 2025-03-24 NOTE — PROGRESS NOTE ADULT - SUBJECTIVE AND OBJECTIVE BOX
Seen on HD      VITAL:  T(C): , Max: 37.1 (03-24-25 @ 00:31)  T(F): , Max: 98.7 (03-24-25 @ 00:31)  HR: 80 (03-24-25 @ 07:54)  BP: 134/67 (03-24-25 @ 07:54)  BP(mean): --  RR: 18 (03-24-25 @ 07:54)  SpO2: 94% (03-24-25 @ 07:54)  Wt(kg): --      PHYSICAL EXAM:  Constitutional: alert, NAD  HEENT: NCAT, DMM  Neck: Supple, No JVD  Respiratory: CTA-b/l  Cardiovascular: RRR s1s2, no m/r/g  Gastrointestinal: BS+, soft, NT/ND  : (+)suprapubic cath  Extremities: 1+ b/l LE edema  Neurological: reduced generalized strength  Back: no CVAT b/l  Skin: (+)cyanotic changes b/l feet  Access: RIJ tunneled cath-accessed    LABS:                        9.9    10.94 )-----------( 245      ( 24 Mar 2025 07:13 )             33.4     Na(129)/K(4.6)/Cl(93)/HCO3(21)/BUN(34)/Cr(4.04)Glu(100)/Ca(7.5)/Mg(--)/PO4(--)    03-24 @ 07:13        IMPRESSION: 74M w/ polio, neurogenic bladder/suprapubic catheter, 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 On HD now    (2)Anemia - s/p IV iron; on Retacrit with HD    (3)PAD - s/p LE bypass 3/3/25 - cyanotic changes of toes b/l - for further management after discharge    (4)CV - multifactorial LE edema; improving with increasing intradialytic UF, and with improving nutritional parameters    (5)Neuro - reduced generalized strength - getting PT/awaiting NYDIA        RECOMMEND:   (1)HD today as ordered; 1.5kg UF as able              Rafita Denny MD  [facility]  Office/on call physician: [phone]  Cell (7a-7p): [phone]       Seen on HD  (+)intermittent LE pain and intermittent nausea    VITAL:  T(C): , Max: 37.1 (03-24-25 @ 00:31)  T(F): , Max: 98.7 (03-24-25 @ 00:31)  HR: 80 (03-24-25 @ 07:54)  BP: 134/67 (03-24-25 @ 07:54)  BP(mean): --  RR: 18 (03-24-25 @ 07:54)  SpO2: 94% (03-24-25 @ 07:54)  Wt(kg): --      PHYSICAL EXAM:  Constitutional: alert, NAD  HEENT: NCAT, DMM  Neck: Supple, No JVD  Respiratory: CTA-b/l  Cardiovascular: RRR s1s2, no m/r/g  Gastrointestinal: BS+, soft, NT/ND  : (+)suprapubic cath  Extremities: 1+ b/l LE edema  Neurological: reduced generalized strength  Back: no CVAT b/l  Skin: (+)cyanotic changes b/l feet  Access: RIJ tunneled cath-accessed    LABS:                        9.9    10.94 )-----------( 245      ( 24 Mar 2025 07:13 )             33.4     Na(129)/K(4.6)/Cl(93)/HCO3(21)/BUN(34)/Cr(4.04)Glu(100)/Ca(7.5)/Mg(--)/PO4(--)    03-24 @ 07:13        IMPRESSION: 74M w/ polio, neurogenic bladder/suprapubic catheter, 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 On HD now    (2)Anemia - s/p IV iron; on Retacrit with HD    (3)PAD - s/p LE bypass 3/3/25 - cyanotic changes of toes b/l - for further management after discharge    (4)CV - multifactorial LE edema; improving with increasing intradialytic UF, and with improving nutritional parameters    (5)Neuro - reduced generalized strength - getting PT/awaiting NYDIA        RECOMMEND:   (1)HD today as ordered; 1.5kg UF as able              Rafita Denny MD  [facility]  Office/on call physician: [phone]  Cell (7a-7p): [phone]

## 2025-03-24 NOTE — PROGRESS NOTE ADULT - ASSESSMENT
74 year old male with CKD, sp polio, paraplegia with associated neurogenic bladder s/p suprapubic catheter who was admitted s/p fall on 2/16.   CKD - ESRD, now s/p DEYA [place] 2/17, on HD  ruled out cellulitis around suprapubic cath  2/20 s/p RRT for tremors and confusion -- pt with chronic tremors although notably worse  mrsa negative   2/20 CXR with clear lungs   SPC site with chronic/stable inflammatory changes, no drainage - no sign of SSTI  CTA abd with occlusion of b/l external iliac arteries and b/l superficial femoral arteries with distal reconstitution at popliteal arteries; patent three vessel runoff of RLE with 2 vessel runoff of LLE with occlusion of L mid anterior tibial artery with distal reconstitution  Bcx negative to date   mental status at baseline  Vascular following for worsening PAD with worsening ischemic changes   -3/3 s/p RLE angiogram b/l iliac artery stents     3/16 no tenderness at suprapubic catheter site  no visible erythema at catheter site on exam  UA w/ many epithelial cells, urine culture negative  s/p zosyn 3/14-3/16  SPC site remains without erythema but now noted with some tan crusting on dressing  abd pain less but still represent, will treat for possible SPC site infection  feels pain at SPC site, back and legs improved   3/23 reporting vomiting x5 episodes, no diarrhea or other focal sx  COVID/Flu/RSV negative   remains afebrile, WBC downtrended     Recommendations:   Continue Bactrim SS 1 tab PO Q12h (renally dosed) to finish 7d on 3/25  GI eval pending   HD per renal    Monitor temps/WBC  Wound care following   Continue rest of care per primary team       Bridgette Ramirez M.D.  [facility]  Available on [employer] TEAMS - *PREFERRED*  [phone]  After 5pm on weekdays and all day on weekends - please call [phone]     Thank you for consulting us and involving us in the management of this patients case. In addition to reviewing history, imaging, documents, labs, microbiology, took into account antibiotic stewardship, local antibiogram and infection control strategies and potential transmission issues at time of treatment decision making process.

## 2025-03-24 NOTE — PROGRESS NOTE ADULT - PROBLEM SELECTOR PLAN 6
-Monitor leukocytosis/fever curve  -CXR as above, no clear infiltrate  -ID f/u.  3/24  on bactrim till tomorrow:  WBC Is mildly elevated  : no fever

## 2025-03-24 NOTE — PROGRESS NOTE ADULT - SUBJECTIVE AND OBJECTIVE BOX
Patient is a 74y old  Male who presents with a chief complaint of ESRD requiring HD, uremia (24 Mar 2025 14:51)      SUBJECTIVE / OVERNIGHT EVENTS: ptn  was seen by GI for N/V, its not clear the etiology, will obtain Ct C/A/P. ptn states he is coughing up green mucus as well    MEDICATIONS  (STANDING):  albuterol/ipratropium for Nebulization 3 milliLiter(s) Nebulizer every 6 hours  aspirin  chewable 81 milliGRAM(s) Oral daily  atorvastatin 40 milliGRAM(s) Oral at bedtime  chlorhexidine 2% Cloths 1 Application(s) Topical daily  clopidogrel Tablet 75 milliGRAM(s) Oral daily  epoetin aleah-epbx (RETACRIT) Injectable 37950 Unit(s) IV Push <User Schedule>  hydrOXYzine hydrochloride 25 milliGRAM(s) Oral three times a day  ibuprofen  Tablet. 400 milliGRAM(s) Oral every 12 hours  mupirocin 2% Ointment 1 Application(s) Topical <User Schedule>  oxyCODONE  ER Tablet 30 milliGRAM(s) Oral every 12 hours  pantoprazole    Tablet 40 milliGRAM(s) Oral before breakfast  polyethylene glycol 3350 17 Gram(s) Oral daily  povidone iodine 10% Solution 1 Application(s) Topical two times a day  senna 2 Tablet(s) Oral at bedtime  trimethoprim   80 mG/sulfamethoxazole 400 mG 1 Tablet(s) Oral two times a day    MEDICATIONS  (PRN):  acetaminophen     Tablet .. 650 milliGRAM(s) Oral every 6 hours PRN Temp greater or equal to 38C (100.4F), Mild Pain (1 - 3)  benzocaine/menthol Lozenge 1 Lozenge Oral three times a day PRN Sore Throat  bisacodyl 5 milliGRAM(s) Oral every 12 hours PRN Constipation  HYDROmorphone  Injectable 2 milliGRAM(s) IV Push every 4 hours PRN Severe Pain (7 - 10)  oxyCODONE    IR 10 milliGRAM(s) Oral every 4 hours PRN Moderate Pain (4 - 6)  sodium chloride 0.9% lock flush 10 milliLiter(s) IV Push every 1 hour PRN Pre/post blood products, medications, blood draw, and to maintain line patency      Vital Signs Last 24 Hrs  T(F): 97.5 (03-24-25 @ 10:54), Max: 98.7 (03-24-25 @ 00:31)  HR: 88 (03-24-25 @ 10:54) (78 - 88)  BP: 124/74 (03-24-25 @ 10:54) (124/74 - 151/91)  RR: 18 (03-24-25 @ 10:54) (18 - 18)  SpO2: 96% (03-24-25 @ 10:54) (94% - 97%)  Telemetry:   CAPILLARY BLOOD GLUCOSE        I&O's Summary    24 Mar 2025 07:01  -  24 Mar 2025 15:20  --------------------------------------------------------  IN: 0 mL / OUT: 1500 mL / NET: -1500 mL        PHYSICAL EXAM:  GENERAL: NAD, well-developed  HEAD:  Atraumatic, Normocephalic  EYES: EOMI, PERRLA, conjunctiva and sclera clear  NECK: Supple, No JVD  CHEST/LUNG: Clear to auscultation bilaterally; No wheeze  HEART: Regular rate and rhythm; No murmurs, rubs, or gallops  ABDOMEN: Soft, Nontender, Nondistended; Bowel sounds present  EXTREMITIES:  2+ Peripheral Pulses, No clubbing, cyanosis, or edema  PSYCH: AAOx3  NEUROLOGY: non-focal  SKIN: No rashes or lesions    LABS:                        9.9    10.94 )-----------( 245      ( 24 Mar 2025 07:13 )             33.4     03-24    129[L]  |  93[L]  |  34[H]  ----------------------------<  100[H]  4.6   |  21[L]  |  4.04[H]    Ca    7.5[L]      24 Mar 2025 07:13            Urinalysis Basic - ( 24 Mar 2025 07:13 )    Color: x / Appearance: x / SG: x / pH: x  Gluc: 100 mg/dL / Ketone: x  / Bili: x / Urobili: x   Blood: x / Protein: x / Nitrite: x   Leuk Esterase: x / RBC: x / WBC x   Sq Epi: x / Non Sq Epi: x / Bacteria: x        RADIOLOGY & ADDITIONAL TESTS:    Imaging Personally Reviewed:    Consultant(s) Notes Reviewed:      Care Discussed with Consultants/Other Providers:

## 2025-03-24 NOTE — PROGRESS NOTE ADULT - SUBJECTIVE AND OBJECTIVE BOX
Date of Service: 03-24-25 @ 14:51    Patient is a 74y old  Male who presents with a chief complaint of ESRD requiring HD, uremia (24 Mar 2025 10:10)      Any change in ROS: seems to be doing  ok : no sob: no cough :   no phlegm   has pain in leg  : no cough :       MEDICATIONS  (STANDING):  albuterol/ipratropium for Nebulization 3 milliLiter(s) Nebulizer every 6 hours  aspirin  chewable 81 milliGRAM(s) Oral daily  atorvastatin 40 milliGRAM(s) Oral at bedtime  chlorhexidine 2% Cloths 1 Application(s) Topical daily  clopidogrel Tablet 75 milliGRAM(s) Oral daily  epoetin aleah-epbx (RETACRIT) Injectable 14951 Unit(s) IV Push <User Schedule>  guaiFENesin ER 1200 milliGRAM(s) Oral every 12 hours  hydrOXYzine hydrochloride 25 milliGRAM(s) Oral three times a day  ibuprofen  Tablet. 400 milliGRAM(s) Oral every 12 hours  mupirocin 2% Ointment 1 Application(s) Topical <User Schedule>  oxyCODONE  ER Tablet 30 milliGRAM(s) Oral every 12 hours  pantoprazole    Tablet 40 milliGRAM(s) Oral before breakfast  polyethylene glycol 3350 17 Gram(s) Oral daily  povidone iodine 10% Solution 1 Application(s) Topical two times a day  senna 2 Tablet(s) Oral at bedtime  trimethoprim   80 mG/sulfamethoxazole 400 mG 1 Tablet(s) Oral two times a day    MEDICATIONS  (PRN):  acetaminophen     Tablet .. 650 milliGRAM(s) Oral every 6 hours PRN Temp greater or equal to 38C (100.4F), Mild Pain (1 - 3)  benzocaine/menthol Lozenge 1 Lozenge Oral three times a day PRN Sore Throat  bisacodyl 5 milliGRAM(s) Oral every 12 hours PRN Constipation  HYDROmorphone  Injectable 2 milliGRAM(s) IV Push every 4 hours PRN Severe Pain (7 - 10)  oxyCODONE    IR 10 milliGRAM(s) Oral every 4 hours PRN Moderate Pain (4 - 6)  sodium chloride 0.9% lock flush 10 milliLiter(s) IV Push every 1 hour PRN Pre/post blood products, medications, blood draw, and to maintain line patency    Vital Signs Last 24 Hrs  T(C): 36.4 (24 Mar 2025 10:54), Max: 37.1 (24 Mar 2025 00:31)  T(F): 97.5 (24 Mar 2025 10:54), Max: 98.7 (24 Mar 2025 00:31)  HR: 88 (24 Mar 2025 10:54) (78 - 88)  BP: 124/74 (24 Mar 2025 10:54) (124/74 - 151/91)  BP(mean): --  RR: 18 (24 Mar 2025 10:54) (18 - 18)  SpO2: 96% (24 Mar 2025 10:54) (94% - 97%)    Parameters below as of 24 Mar 2025 10:54  Patient On (Oxygen Delivery Method): room air        I&O's Summary    24 Mar 2025 07:01  -  24 Mar 2025 14:51  --------------------------------------------------------  IN: 0 mL / OUT: 1500 mL / NET: -1500 mL          Physical Exam:   GENERAL: NAD, well-groomed, well-developed  HEENT: VINNY/   Atraumatic, Normocephalic  ENMT: No tonsillar erythema, exudates, or enlargement; Moist mucous membranes, Good dentition, No lesions  NECK: Supple, No JVD, Normal thyroid  CHEST/LUNG: Clear to auscultaion  CVS: Regular rate and rhythm; No murmurs, rubs, or gallops  GI: : Soft, Nontender, Nondistended; Bowel sounds present  NERVOUS SYSTEM:  Alert & Oriented X3  EXTREMITIES:left leg wrapped:  rigth leg with gangrenosus digits   LYMPH: No lymphadenopathy noted  SKIN: No rashes or lesions  ENDOCRINOLOGY: No Thyromegaly  PSYCH: Appropriate    Labs:                              9.9    10.94 )-----------( 245      ( 24 Mar 2025 07:13 )             33.4     03-24    129[L]  |  93[L]  |  34[H]  ----------------------------<  100[H]  4.6   |  21[L]  |  4.04[H]    Ca    7.5[L]      24 Mar 2025 07:13      CAPILLARY BLOOD GLUCOSE              Urinalysis Basic - ( 24 Mar 2025 07:13 )    Color: x / Appearance: x / SG: x / pH: x  Gluc: 100 mg/dL / Ketone: x  / Bili: x / Urobili: x   Blood: x / Protein: x / Nitrite: x   Leuk Esterase: x / RBC: x / WBC x   Sq Epi: x / Non Sq Epi: x / Bacteria: x    rad< from: Xray Chest 1 View AP/PA (03.23.25 @ 18:15) >    ACC: [id number] EXAM:  XR CHEST AP OR PA 1V   ORDERED BY:  AZALEA MARTIN     PROCEDURE DATE:  03/23/2025          INTERPRETATION:  DATE OF STUDY: 3/23/25    PRIOR: 3/1/125    CLINICAL INDICATION: Fall    TECHNIQUE: AP radiograph of the chest.    FINDINGS:  Right-sided tunneled hemodialysis catheter terminates in the SVC  Heart is not accurately assessed on this AP projection  Visualized lungs are clear. No pneumothorax. No sizable pleural effusion.    IMPRESSION:    Negative for acute cardiopulmonary process.    --- End of Report ---            SONAM WOLF MD; Attending Radiologist  This document has been electronically signed. Mar 24 2025 11:05AM    < end of copied text >          RECENT CULTURES:        RESPIRATORY CULTURES:          Studies  Chest X-RAY  CT SCAN Chest   Venous Dopplers: LE:   CT Abdomen  Others

## 2025-03-24 NOTE — PROGRESS NOTE ADULT - ASSESSMENT
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 The initial plan was that he would present to the [facility] ER Monday 2/17 for HD initiation. However, day of admission, he fell and sustained trauma to his knee. Called to consult for cough, elevated R hemidiaphragm.

## 2025-03-24 NOTE — CONSULT NOTE ADULT - SUBJECTIVE AND OBJECTIVE BOX
Chief Complaint:  Patient is a 74y old  Male who presents with a chief complaint of ESRD requiring HD, uremia (24 Mar 2025 15:19)      Date of service: 25 @ 21:15    HPI:    The patient is a 74 year old man with CKD who presented with symptoms of kidney failure.  He was started on HD.  The patient has now developed nausea, but denies vomiting or abdominal pain.  THis morning he feels improved.      Allergies:  No Known Allergies      Home Medications:    Hospital Medications:  acetaminophen     Tablet .. 650 milliGRAM(s) Oral every 6 hours PRN  albuterol/ipratropium for Nebulization 3 milliLiter(s) Nebulizer every 6 hours  aspirin  chewable 81 milliGRAM(s) Oral daily  atorvastatin 40 milliGRAM(s) Oral at bedtime  benzocaine/menthol Lozenge 1 Lozenge Oral three times a day PRN  bisacodyl 5 milliGRAM(s) Oral every 12 hours PRN  chlorhexidine 2% Cloths 1 Application(s) Topical daily  clopidogrel Tablet 75 milliGRAM(s) Oral daily  epoetin aleah-epbx (RETACRIT) Injectable 04782 Unit(s) IV Push <User Schedule>  HYDROmorphone  Injectable 2 milliGRAM(s) IV Push every 4 hours PRN  hydrOXYzine hydrochloride 25 milliGRAM(s) Oral three times a day  ibuprofen  Tablet. 400 milliGRAM(s) Oral every 12 hours  mupirocin 2% Ointment 1 Application(s) Topical <User Schedule>  oxyCODONE    IR 10 milliGRAM(s) Oral every 4 hours PRN  oxyCODONE  ER Tablet 30 milliGRAM(s) Oral every 12 hours  pantoprazole    Tablet 40 milliGRAM(s) Oral before breakfast  polyethylene glycol 3350 17 Gram(s) Oral daily  povidone iodine 10% Solution 1 Application(s) Topical two times a day  senna 2 Tablet(s) Oral at bedtime  sodium chloride 0.9% lock flush 10 milliLiter(s) IV Push every 1 hour PRN  trimethoprim   80 mG/sulfamethoxazole 400 mG 1 Tablet(s) Oral two times a day      PMHX/PSHX:  Polio    Diabetes    Pulmonary hypertension    Hypertension    H/O urinary retention    HLD (hyperlipidemia)    BPH with obstruction/lower urinary tract symptoms    Type 2 diabetes mellitus    Erectile dysfunction    Bladder outlet obstruction    Presence of suprapubic catheter    Colostomy status    DVT, lower extremity    H/O cardiac murmur    No significant past surgical history    S/P colostomy    Suprapubic catheter    H/O total hip arthroplasty, right    Presence of internal fixation gianluca in upper extremity    S/P ORIF (open reduction internal fixation) fracture    S/P cataract surgery    H/O shoulder surgery        Family history:  No pertinent family history in first degree relatives    FH: type 2 diabetes        Social History:   Denies ethanol use.  Denies illicit drug use.    ROS:     General:  No wt loss, fevers, chills, night sweats, fatigue,   Eyes:  Good vision, no reported pain  ENT:  No sore throat, pain, runny nose, dysphagia  CV:  No pain, palpitations, hypo/hypertension  Resp:  No dyspnea, cough, tachypnea, wheezing  GI:  See HPI  :  No pain, bleeding, incontinence, nocturia  Muscle:  No pain, weakness  Neuro:  No weakness, tingling, memory problems  Psych:  No fatigue, insomnia, mood problems, depression  Endocrine:  No polyuria, polydipsia, cold/heat intolerance  Heme:  No petechiae, ecchymosis, easy bruisability  Integumentary:  No rash, edema      PHYSICAL EXAM:     GENERAL:  Appears stated age, well-groomed, well-nourished, no distress  HEENT:  NC/AT,  conjunctivae anicteric, clear and pink,   NECK: supple, trachea midline  CHEST:  Full & symmetric excursion, no increased effort, breath sounds clear  HEART:  Regular rhythm, no JVD  ABDOMEN:  Soft, non-tender, non-distended, normoactive bowel sounds,  no masses , no hepatosplenomegaly  EXTREMITIES:  no cyanosis,clubbing or edema  SKIN:  No rash, erythema, or, ecchymoses, no jaundice  NEURO:  Alert, non-focal, no asterixis  PSYCH: Appropriate affect, oriented to place and time  RECTAL: Deferred      Vital Signs:  Vital Signs Last 24 Hrs  T(C): 36.6 (24 Mar 2025 16:38), Max: 37.1 (24 Mar 2025 00:31)  T(F): 97.8 (24 Mar 2025 16:38), Max: 98.7 (24 Mar 2025 00:31)  HR: 92 (24 Mar 2025 16:38) (80 - 92)  BP: 106/68 (24 Mar 2025 16:38) (106/68 - 151/91)  BP(mean): --  RR: 18 (24 Mar 2025 16:38) (18 - 18)  SpO2: 96% (24 Mar 2025 16:38) (94% - 97%)    Parameters below as of 24 Mar 2025 16:38  Patient On (Oxygen Delivery Method): room air      Daily     Daily Weight in k.2 (24 Mar 2025 10:54)    LABS: Labs personally reviewed by me:                        9.9    10.94 )-----------( 245      ( 24 Mar 2025 07:13 )             33.4     03-24    129[L]  |  93[L]  |  34[H]  ----------------------------<  100[H]  4.6   |  21[L]  |  4.04[H]    Ca    7.5[L]      24 Mar 2025 07:13          Urinalysis Basic - ( 24 Mar 2025 07:13 )    Color: x / Appearance: x / SG: x / pH: x  Gluc: 100 mg/dL / Ketone: x  / Bili: x / Urobili: x   Blood: x / Protein: x / Nitrite: x   Leuk Esterase: x / RBC: x / WBC x   Sq Epi: x / Non Sq Epi: x / Bacteria: x          Imaging personally reviewed by me:

## 2025-03-25 NOTE — PROGRESS NOTE ADULT - SUBJECTIVE AND OBJECTIVE BOX
Neurology      S; patient seen. no neuro changes      Medications: MEDICATIONS  (STANDING):  albuterol/ipratropium for Nebulization 3 milliLiter(s) Nebulizer every 6 hours  aspirin  chewable 81 milliGRAM(s) Oral daily  atorvastatin 40 milliGRAM(s) Oral at bedtime  chlorhexidine 2% Cloths 1 Application(s) Topical daily  clopidogrel Tablet 75 milliGRAM(s) Oral daily  epoetin aleah-epbx (RETACRIT) Injectable 86866 Unit(s) IV Push <User Schedule>  hydrOXYzine hydrochloride 25 milliGRAM(s) Oral three times a day  ibuprofen  Tablet. 400 milliGRAM(s) Oral every 12 hours  mupirocin 2% Ointment 1 Application(s) Topical <User Schedule>  oxyCODONE  ER Tablet 30 milliGRAM(s) Oral every 12 hours  pantoprazole    Tablet 40 milliGRAM(s) Oral before breakfast  polyethylene glycol 3350 17 Gram(s) Oral daily  povidone iodine 10% Solution 1 Application(s) Topical two times a day  senna 2 Tablet(s) Oral at bedtime  trimethoprim   80 mG/sulfamethoxazole 400 mG 1 Tablet(s) Oral two times a day    MEDICATIONS  (PRN):  acetaminophen     Tablet .. 650 milliGRAM(s) Oral every 6 hours PRN Temp greater or equal to 38C (100.4F), Mild Pain (1 - 3)  benzocaine/menthol Lozenge 1 Lozenge Oral three times a day PRN Sore Throat  bisacodyl 5 milliGRAM(s) Oral every 12 hours PRN Constipation  HYDROmorphone  Injectable 2 milliGRAM(s) IV Push every 4 hours PRN Severe Pain (7 - 10)  oxyCODONE    IR 10 milliGRAM(s) Oral every 4 hours PRN Moderate Pain (4 - 6)  sodium chloride 0.9% lock flush 10 milliLiter(s) IV Push every 1 hour PRN Pre/post blood products, medications, blood draw, and to maintain line patency       Vitals:  Vital Signs Last 24 Hrs  T(C): 36.7 (25 Mar 2025 07:52), Max: 36.8 (25 Mar 2025 00:10)  T(F): 98 (25 Mar 2025 07:52), Max: 98.2 (25 Mar 2025 00:10)  HR: 77 (25 Mar 2025 07:52) (77 - 92)  BP: 98/63 (25 Mar 2025 07:52) (98/61 - 106/68)  BP(mean): 74 (25 Mar 2025 07:52) (74 - 74)  RR: 14 (25 Mar 2025 07:52) (14 - 18)  SpO2: 100% (25 Mar 2025 07:52) (96% - 100%)    Parameters below as of 25 Mar 2025 07:52  Patient On (Oxygen Delivery Method): room air                            General Appearance: Appropriately dressed and in no acute distress       Head: Normocephalic, atraumatic and no dysmorphic features  Ear, Nose, and Throat: Moist mucous membranes  CVS: S1S2+  Resp: No SOB, no wheeze or rhonchi  GI: soft NT/ND  Extremities: LE PVD : dusky toes noted   Skin: + decub      Neurological Exam:  Mental Status: Awake, alert and oriented x 2.  Able to follow simple and complex verbal commands. Able to name and repeat. fluent speech. No obvious aphasia or dysarthria noted.   Cranial Nerves: PERRL, EOMI, VFFC, sensation V1-V3 intact,  no obvious facial asymmetry, equal elevation of palate, scm/trap 5/5, tongue is midline on protrusion. no obvious papilledema on fundoscopic exam. hearing is grossly intact.   Motor: Normal bulk, tone and strength throughout uppers 4/ lowers 0-/1 5   Sensation: Intact to light touch and pinprick throughout.     Coordination: No dysmetria on FNF   Gait: deferred, wheelchair bound     Data/Labs/Imaging which I personally reviewed.         LABS:    LABS: no new labs                           9.9    10.94 )-----------( 245      ( 24 Mar 2025 07:13 )             33.4     03-24    129[L]  |  93[L]  |  34[H]  ----------------------------<  100[H]  4.6   |  21[L]  |  4.04[H]    Ca    7.5[L]      24 Mar 2025 07:13          Urinalysis Basic - ( 24 Mar 2025 07:13 )    Color: x / Appearance: x / SG: x / pH: x  Gluc: 100 mg/dL / Ketone: x  / Bili: x / Urobili: x   Blood: x / Protein: x / Nitrite: x   Leuk Esterase: x / RBC: x / WBC x   Sq Epi: x / Non Sq Epi: x / Bacteria: x                    < from: CT Head No Cont (02.20.25 @ 18:47) >    ACC: [id number] EXAM:  CT BRAIN   ORDERED BY: GUY DE LA CRUZ     PROCEDURE DATE:  02/20/2025          INTERPRETATION:  CLINICAL INDICATION: Altered mental status    TECHNIQUE: Axial CT scanning of the brain was obtained from the skull   base to the vertex without the administration of intravenous contrast.   Reformatted coronal and sagittal images were subsequently obtained and   reviewed.    COMPARISON: None    FINDINGS:  There is no CT evidence of acute transcortical infarct. Age-related   involutional changes and chronic microvascular ischemic changes.    There is no hydrocephalus, mass effect, or acute intracranial hemorrhage.   No extra-axial collection. Basal cisterns are patent.    The visualized paranasal sinuses and mastoid air cells are clear.    The calvarium is intact.    IMPRESSION:  No evidence of acute transcortical infarct, acute intracranial   hemorrhage, or mass effect.    --- End of Report ---            CORTNEY REARDON MD; Attending Radiologist  This document has been electronically signed. Feb 20 2025  7:07PM    < end of copied text >

## 2025-03-25 NOTE — PROGRESS NOTE ADULT - SUBJECTIVE AND OBJECTIVE BOX
Overnight events noted      VITAL:  T(C): , Max: 36.8 (03-25-25 @ 00:10)  T(F): , Max: 98.2 (03-25-25 @ 00:10)  HR: 77 (03-25-25 @ 07:52)  BP: 98/63 (03-25-25 @ 07:52)  BP(mean): 74 (03-25-25 @ 07:52)  RR: 14 (03-25-25 @ 07:52)  SpO2: 100% (03-25-25 @ 07:52)  Wt(kg): --      PHYSICAL EXAM:  Constitutional: alert, NAD  HEENT: NCAT, DMM  Neck: Supple, No JVD  Respiratory: CTA-b/l  Cardiovascular: RRR s1s2, no m/r/g  Gastrointestinal: BS+, soft, NT/ND  : (+)suprapubic cath  Extremities: 1+ b/l LE edema  Neurological: reduced generalized strength  Back: no CVAT b/l  Skin: (+)cyanotic changes b/l feet  Access: RIJ tunneled cath  LABS:                        9.9    10.94 )-----------( 245      ( 24 Mar 2025 07:13 )             33.4     Na(129)/K(4.6)/Cl(93)/HCO3(21)/BUN(34)/Cr(4.04)Glu(100)/Ca(7.5)/Mg(--)/PO4(--)    03-24 @ 07:13      IMPRESSION: 74M w/ polio, neurogenic bladder/suprapubic catheter, 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 Last dialyzed yesterday; due for next HD tomorrow. Tolerated 1.5L UF yesterday; we can try to 2L UF tomorrow    (2)Hyponatremia - will further improve with HD    (3)Anemia - s/p IV iron; on Retacrit with HD    (4)PAD - s/p LE bypass 3/3/25 - cyanotic changes of toes b/l - for further management after discharge    (5)CV - multifactorial LE edema; improving with increasing intradialytic UF, and with improving nutritional parameters    (6)Neuro - reduced generalized strength - getting PT/awaiting NYDIA        RECOMMEND:   (1)Next HD tomorrow - 2L UF as able          Rafita Denny MD  [facility]  Office/on call physician: [phone]  Cell (7a-7p): [phone]       (+)LLE tingling  (+)fatigue      VITAL:  T(C): , Max: 36.8 (03-25-25 @ 00:10)  T(F): , Max: 98.2 (03-25-25 @ 00:10)  HR: 77 (03-25-25 @ 07:52)  BP: 98/63 (03-25-25 @ 07:52)  BP(mean): 74 (03-25-25 @ 07:52)  RR: 14 (03-25-25 @ 07:52)  SpO2: 100% (03-25-25 @ 07:52)      PHYSICAL EXAM:  Constitutional: alert, NAD  HEENT: NCAT, DMM  Neck: Supple, No JVD  Respiratory: CTA-b/l  Cardiovascular: RRR s1s2, no m/r/g  Gastrointestinal: BS+, soft, NT/ND  : (+)suprapubic cath  Extremities: 1+ b/l LE edema  Neurological: reduced generalized strength  Back: no CVAT b/l  Skin: (+)cyanotic changes b/l feet  Access: RIJ tunneled cath    LABS:                        9.9    10.94 )-----------( 245      ( 24 Mar 2025 07:13 )             33.4     Na(129)/K(4.6)/Cl(93)/HCO3(21)/BUN(34)/Cr(4.04)Glu(100)/Ca(7.5)/Mg(--)/PO4(--)    03-24 @ 07:13        IMPRESSION: 74M w/ polio, neurogenic bladder/suprapubic catheter, 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 Last dialyzed yesterday; due for next HD tomorrow. Tolerated 1.5L UF yesterday; soft BP today - we should not increase goal beyond 1.5L    (2)Hyponatremia - will further improve with HD    (3)Anemia - s/p IV iron; on Retacrit with HD    (4)PAD - s/p LE bypass 3/3/25 - cyanotic changes of toes b/l - for further management after discharge    (5)CV - multifactorial LE edema; improving with increasing intradialytic UF, and with improving nutritional parameters    (6)Neuro - reduced generalized strength - getting PT/awaiting NYDIA        RECOMMEND:   (1)Next HD tomorrow - 1.5L UF as able          Rafita Denny MD  [facility]  Office/on call physician: [phone]  Cell (7a-7p): [phone]

## 2025-03-25 NOTE — PROGRESS NOTE ADULT - ASSESSMENT
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 He is well known to me from multiple recent admisions  s/p admissions at [facility] 12/20-12/25/24 and 2/4-2/7 with SONDRA on CKD with associated uremic symptoms.   At the last admission he had expressed strong interest to try to hold off with HD intiation but he has been getting worse clinically at home.  His SONDRA and uremic symptoms significantly improved after the first admission; they improved a to mild extent during the 2nd admission to the point where he was able to be discharged without HD. Since then, however, he has worsened further.   He has been suffering from progressively worsening diffuse pruritus, loss of appetite, and generalized weakness and falls.   His nephrologist and PCP has been speaking with him and his family over the past few days,   The initial plan was that he would present to the [facility] ER Monday 2/17 (ie tomorrow) for HD initiation. Today, however, he fell and sustained trauma to his knee. Given the fall and concern for knee fracture, he presented to the ER today. multiple xrays show no Fractures.      CKD5, uremia, requiring initiation of HD  Rash, seborrheic dermatitis  Pruritis  Anemia  PAD  SP catheter, chronic  Left inferior pole acute on chronic patella Fx    plan  - HD via R subclavian permacath  - 3/25: wife at bedside, daughter on speaker phone at bedside. ptn is pain free at the time of visit and states he wants to cont the pain regiment he is presently on. ct C/A/P revealed: RLL PNA, prob aspiration, stercoral proctitis, decubitus ulcer w possible progression to sacral OM, R IJ nonocclusive DVT. these findings d/w ptn ,his family, wound care, ACP, Vascular, Nephrology, ID, pUlm, GI. meropenem initiated, Bactrim stopped. ptn states when he is cleared for DC, he wants to go home , not to NYDIA, not to SNF. daughter and wife aware.   -3/24: ptn  was seen by GI for N/V, its not clear the etiology, will obtain Ct C/A/P. ptn states he is coughing up green mucus as well  -3/23:  ptn is in good spirits, says he vomited "spit" this am, but tolerated all meals without vomiting. no abd pain, no undigested food in the vomit. afebrile, no diarrhea, RVP neg. GI consulted.   -3/21-22: ptn feels well.  pain is controlled. still no accepting facility for NYDIA  -3/20: ptn states he has severe low back and LLE pain though overall is improving.   -3/19: ptn w tan crusting around SPC , states its painful. started on po Bactrim, renally dosed by ID for cellulitis.   -3/18: LLE paraesthesias are chronic, will d/w CM re dc planning to [facility]    -3/17: ptn states he is lethargic, he has LLE numbness which is new and severe pain at SPC insertion site. this was ID, d/w neuro, renal and vascular. as per ID: no sign of an acute infection recommend CT A/P.   -3/16:  urine cx from 3/15 NTD, zosyn DCed, awaiting dc to [facility], not sure will be able to go to [facility] since there is an insurance coverage conflict. HD as per renal  - 3/15: spoke w ptn's daughter today re denial from [employer] for [facility]. ptn has [employer] medicare advantage plan, which [facility] doesnt accept. i recommended to speak  to Cm and to the insurance company to find participating facilities. ptn is stable today. pain and erythema at SP catheter site has resolved today. seen by ID, will cont ZOSYN for now. UCx sent.   - 3/14: suprapubic tube changhed by , ptn has crusting at the insertion site and pain. will start Abx, urine always has sediment, will sent for cx, start Vanco/ZOsyn. ID consult called. check MRSA/MSSA PCR  -3/13: ptn is doing well. ptn has an accepting rehab facility, now pending AUTH.   -3/12: ptn is no longer constipated. doing well off prn IV DIlaudid. awaiting dc to [facility]  -3/11: ptn has no new c/o other than feeling constipated, lactulose ordered. also in preparation for [facility] will dc prn IV DIlaudid, cont prn OXy IR  -3/10:  ptn is awake, alert, wife at bedside, i instructed them to give rehab choices. also awaiting SPC change to be done by urology. pain is controlled  -3/9: seen by PT, will refer to [facility]. choices to be given in AM. this was d/w ptn, daughter, wife.   -3/8:  ptn didnt want to get PT eval done, will reorder. ptn needs NYDIA placement. PMNR consult ordered  - 3/7: ptn is alert , pain is controlled, DC planning d/w ptn and HCP, daughter Yanique  -3/6:  ptn is awake, alert, ecchymotic feet look improved, pain is controlled. DC planning to [facility]  3/4-5 - s/p b/l iliac arteries angioplasty and stent placements on 3/3. today has new ecchymoses in b/l LE, concern for arterial insufficiency. vascular aware.. wound from Left knee immobilizer is dressed and healing. pain is controlled.   LEFT UE AVF placement/scheduling will be on outptn basis as per vascular. dc planning to [facility]  - 3/3: ptn is s/p angiogram RLE : b/l iliac arteries w successful angioplasty and stents. in PACU. awaitng HD.  ptn has a pressure wound from the LLE immobilizer posteriorly proximal to the knee. its been on 2/2 knee fracture, applied by ortho and managed by ptn's wife as per ptn's and wife's request , this was d/w nursing and nurse manager ms Ruiz.. immobilizer should be managed by orthopedics and nursing. will keep it off, only keep on when repositioning for care and then remove. wound care to F/U . this was discussed at length w daughter Yanique and wife Dee Dee.   -3/1-3/2: ptn is smiling, pain free, had HD 2/28 and 3/1, awaiting RLE angiogram 3/3, on Heparin drip, euvolemic  -2/28: ptn is lethargic, awaiting RLE angiogram possible fem-fem bypass hopefully on 3/3 pending OR availability, awaiting HD today    -2/27:  plan for angiogram in am with possible angioplasty, possible stent, possible fem-fem bypass. medically cleared for angiogram and possible surgery, stent, angioplasty. pain is controlled. remains on heparin drip as per vascular. HD as per renal    -2/26:  ptn is sleeping, pain is controlled, he was seen by wound care and vascular attending. planning on angiogram 2/2 severe PAD in LE on CTA. he also spoke to HCP. ptn and HCP in agreement. ptn was started on HEPARIN drip as per vascular recs. RIJ permacath done 2/25    - 2/25: ptn states he is hallucinating, but not at present, his MS is at baseline, his pain is controlled, he still needs prn pain meds when he is moved in the bed or for testing or for HD. awaiting Permacath, AVF creation, LE bypass to be d/w Dr. Swenson and the ptn tomorrow. ptn has cardiology clearance  if he opts for. Ptn has sacral decubiti and posterior thigh and buttock decibiti and b/l heel decubiti. Z flow bootie d/w RN, get wound care consult    -2/24: pain is controlled  if the LLE is immobile and fully extended. doesn't tolerate the knee immobilizer. has a medium condyle and acute on chronic patella fx in LEFT knee. as per vascular ptn will need iliac stent  w a fem-fem bypass, possible axillo-femoral bypass. for this surgery he would need cardiac work up . more pressing surgery is LUE AVF creation,  in IR will arrange for Permacath. for pain control: Motrin 400 mg q12H, Oxy ER 30 mg q12H, Oxy IR 10 for mod pain and dilaudid 2 mg iv for severe pain. still has pruritis though improved, will raise atarax 25 mg to tid. plan of care d/w daughter Norma Persaud , ptn and his wife. Also d/w renal, card, vascular, ACP    -2/23: Daughter Yanique is the HCP, she and the ptn filled out the paperwork. daily plan and findings d/w her and the ptn. MS is at abseline, c/o b/l LE foot pain and Left Knee pain. xrays of left knee: cannot r/o acute on chronic inferior pole patellar Fx. knee immobilizer is on, awaiting ortho consult. doubt needs any intervention awaiting Ct chest today, will add CT Left knee. ptn states itching has recurred, severe pain has recurred. will resume Atatrax ( 25 mg bid) and Oxycodone ER , but at a lower dose 15 mg q12H. cont prn analgesics. HD as per renal, awaiting Vascular consult f/u re CTA A/L &LE and recs. ptn also wants AVF surgery on this admission. Coughing has stopped    - 2/22: ptn is drowsy but arousable, calmer today, answers questions appropriately. he is pain free, he stopped coughing, he denies having pruritis: will DC:  OXY ER, Atarax, Tessalon perles.     -  2/21: ptn is tearful, a bit confused, coughing, recognizes me and family at bedside. GOC d/w daughter and wife. AMS prob delirium 2/2 acute illness +/- opiods, lyrica, atarac. will lower atarak to tid, dc lyrica, cont Oxy 2/2 ptn has severe LE pain. neuro called for eval. Head ct done yesterday w no acute findings. CTA A/P w LE run fof: severe PAD, vascular to follow up on plan fo care. plan for HD tomorrow and next week place on tiw schedule of MWF. will need tunneled catheter prior to DC and will d/w vascular scheduling of AVF. ptn wants all to be done while inptn. daughter wants to be HCP as per ptn's wishes. she was given paperwork to get it signed and witnessed and will present to nursing thereafter. details of findings and complaints and plan of care d/w daughter and wife. spent 60 min. RVP ordered. start mucinex , tessalon perles, duonebs, get CT chest to r/o PNA. pulm called    -  2/20:  ptn had RRT 2/2 AMS and tremors. no SZ, no LOC. noted to have Na 123( hyponatremia), given 500 cc NS. Gabapentin DCed. ptn had been taking gabapentin at home PTA. Pain is controlled. Pruritis resolved, tolerating HD otherwise.     - Pain management:  cont Oxycodone 10 IR q6H,  DIlaudid prn severe pain 2 mg q4H prn.   - cont outptn meds  - DVT ppx w HSC

## 2025-03-25 NOTE — PROGRESS NOTE ADULT - SUBJECTIVE AND OBJECTIVE BOX
INTERVAL HPI/OVERNIGHT EVENTS:    nausea intermittent, abd discomfort described as a tightness intermittently as well    MEDICATIONS  (STANDING):  albuterol/ipratropium for Nebulization 3 milliLiter(s) Nebulizer every 6 hours  aspirin  chewable 81 milliGRAM(s) Oral daily  atorvastatin 40 milliGRAM(s) Oral at bedtime  chlorhexidine 2% Cloths 1 Application(s) Topical daily  clopidogrel Tablet 75 milliGRAM(s) Oral daily  epoetin aleah-epbx (RETACRIT) Injectable 64414 Unit(s) IV Push <User Schedule>  hydrOXYzine hydrochloride 25 milliGRAM(s) Oral three times a day  ibuprofen  Tablet. 400 milliGRAM(s) Oral every 12 hours  mupirocin 2% Ointment 1 Application(s) Topical <User Schedule>  oxyCODONE  ER Tablet 30 milliGRAM(s) Oral every 12 hours  pantoprazole    Tablet 40 milliGRAM(s) Oral before breakfast  polyethylene glycol 3350 17 Gram(s) Oral daily  povidone iodine 10% Solution 1 Application(s) Topical two times a day  senna 2 Tablet(s) Oral at bedtime  trimethoprim   80 mG/sulfamethoxazole 400 mG 1 Tablet(s) Oral two times a day    MEDICATIONS  (PRN):  acetaminophen     Tablet .. 650 milliGRAM(s) Oral every 6 hours PRN Temp greater or equal to 38C (100.4F), Mild Pain (1 - 3)  benzocaine/menthol Lozenge 1 Lozenge Oral three times a day PRN Sore Throat  bisacodyl 5 milliGRAM(s) Oral every 12 hours PRN Constipation  HYDROmorphone  Injectable 2 milliGRAM(s) IV Push every 4 hours PRN Severe Pain (7 - 10)  oxyCODONE    IR 10 milliGRAM(s) Oral every 4 hours PRN Moderate Pain (4 - 6)  sodium chloride 0.9% lock flush 10 milliLiter(s) IV Push every 1 hour PRN Pre/post blood products, medications, blood draw, and to maintain line patency      Allergies    No Known Allergies    Intolerances        Review of Systems:    General:  No wt loss, fevers, chills, night sweats, fatigue   Eyes:  Good vision, no reported pain  ENT:  No sore throat, pain, runny nose, dysphagia  CV:  No pain, palpitations, hypo/hypertension  Resp:  No dyspnea, cough, tachypnea, wheezing  GI:  No pain, No nausea, No vomiting, No diarrhea, No constipation, No weight loss, No fever, No pruritis, No rectal bleeding, No melena, No dysphagia  :  No pain, bleeding, incontinence, nocturia  Muscle:  No pain, weakness  Neuro:  No weakness, tingling, memory problems  Psych:  No fatigue, insomnia, mood problems, depression  Endocrine:  No polyuria, polydypsia, cold/heat intolerance  Heme:  No petechiae, ecchymosis, easy bruisability  Skin:  No rash, tattoos, scars, edema      Vital Signs Last 24 Hrs  T(C): 36.7 (25 Mar 2025 07:52), Max: 36.8 (25 Mar 2025 00:10)  T(F): 98 (25 Mar 2025 07:52), Max: 98.2 (25 Mar 2025 00:10)  HR: 77 (25 Mar 2025 07:52) (77 - 92)  BP: 98/63 (25 Mar 2025 07:52) (98/61 - 106/68)  BP(mean): 74 (25 Mar 2025 07:52) (74 - 74)  RR: 14 (25 Mar 2025 07:52) (14 - 18)  SpO2: 100% (25 Mar 2025 07:52) (96% - 100%)    Parameters below as of 25 Mar 2025 07:52  Patient On (Oxygen Delivery Method): room air        PHYSICAL EXAM:    Constitutional: NAD  HEENT: EOMI, throat clear  Neck: No LAD, supple  Respiratory: CTA and P  Cardiovascular: S1 and S2, RRR, no M  Gastrointestinal: BS+, soft, NT/ND, neg HSM,  Extremities: No peripheral edema, neg clubbing, cyanosis  Vascular: 2+ peripheral pulses  Neurological: A/O   Psychiatric: Normal mood, normal affect  Skin: No rashes      LABS:                        9.9    10.94 )-----------( 245      ( 24 Mar 2025 07:13 )             33.4     03-24    129[L]  |  93[L]  |  34[H]  ----------------------------<  100[H]  4.6   |  21[L]  |  4.04[H]    Ca    7.5[L]      24 Mar 2025 07:13        Urinalysis Basic - ( 24 Mar 2025 07:13 )    Color: x / Appearance: x / SG: x / pH: x  Gluc: 100 mg/dL / Ketone: x  / Bili: x / Urobili: x   Blood: x / Protein: x / Nitrite: x   Leuk Esterase: x / RBC: x / WBC x   Sq Epi: x / Non Sq Epi: x / Bacteria: x        RADIOLOGY & ADDITIONAL TESTS:

## 2025-03-25 NOTE — PROGRESS NOTE ADULT - PROBLEM SELECTOR PLAN 1
pulm wise seems pretty good:  remain son room air:   initial sob has resolved:  adv to do  incentive spirometry  :  always in bed:  high risk for pneumonia:  dw pt    3/22: seems to be doing ok :  on room air:   ct scan chest reviewed   seems to be comfortable'    3/23: rpt cxr today  : on room air;   no sob:  no wheezing;   last chest xray three weeks ago     3/24/ ;he had multiple vomitings yesterday  : repeat cxr yesterday was  normal : did not aspirate:  defer to primary care    3/25: awaiting ct chest  : on room air:  no sob:  no wheezing

## 2025-03-25 NOTE — PROGRESS NOTE ADULT - ASSESSMENT
74 year old man with SONDRA on CKD requiring HD, now with nausea    1. ESRD on HD (new)  per renal    2. Nausea/dyspepsia, seems to be improving  -favor r/t newly started HD  -CT AP pending to assess for other etiologies   -PPI trial  -tolerating diet                I had a prolonged conversation with the patient regarding the hospital course, differential diagnosis, results of diagnostic tests this far, and therapeutic modalities available. Plan of care discussed with the patient after the evaluation. Patient expresses a clear understanding of the plan of care.  Sixty five minutes spent on the total encounter, of which more than fifty percent of the encounter was spent on counseling and/or coordinating care by the attending physician.  Advanced care planning forms were discussed. Code status including forceful chest compressions, defibrillation and intubation were discussed. The risks benefits and alternatives to pertinent gastrointestinal procedures and interventions were discussed in detail and all questions were answered. Duration: 15 Minutes.    [facility]  Clive Hutson M.D.   [address] [place] [address]  Office: [phone]

## 2025-03-25 NOTE — PROGRESS NOTE ADULT - ASSESSMENT
74 year old male with CKD, sp polio, paraplegia with associated neurogenic bladder s/p suprapubic catheter who was admitted s/p fall on 2/16.   CKD - ESRD, now s/p DEYA boston 2/17, on HD  ruled out cellulitis around suprapubic cath  2/20 s/p RRT for tremors and confusion -- pt with chronic tremors although notably worse  mrsa negative   2/20 CXR with clear lungs   SPC site with chronic/stable inflammatory changes, no drainage - no sign of SSTI  CTA abd with occlusion of b/l external iliac arteries and b/l superficial femoral arteries with distal reconstitution at popliteal arteries; patent three vessel runoff of RLE with 2 vessel runoff of LLE with occlusion of L mid anterior tibial artery with distal reconstitution  Bcx negative to date   mental status at baseline  Vascular following for worsening PAD with worsening ischemic changes   -3/3 s/p RLE angiogram b/l iliac artery stents     3/16 no tenderness at suprapubic catheter site  no visible erythema at catheter site on exam  UA w/ many epithelial cells, urine culture negative  s/p zosyn 3/14-3/16  SPC site remains without erythema but now noted with some tan crusting on dressing  abd pain less but still represent, will treat for possible SPC site infection  feels pain at SPC site, back and legs improved   3/23 reporting vomiting x5 episodes, no diarrhea or other focal sx  COVID/Flu/RSV negative  remains afebrile, WBC downtrended, nausea improved     Recommendations:   Continue Bactrim SS 1 tab PO Q12h (renally dosed) to finish 7d today 3/25  Follow CT Chest/Abd/Pelvis, pending   GI following   HD per renal    Monitor temps/WBC  Wound care following   Continue rest of care per primary team       Bridgette Ramirez M.D.  [facility]  Available on [employer] TEAMS - *PREFERRED*  [phone]  After 5pm on weekdays and all day on weekends - please call [phone]     Thank you for consulting us and involving us in the management of this patients case. In addition to reviewing history, imaging, documents, labs, microbiology, took into account antibiotic stewardship, local antibiogram and infection control strategies and potential transmission issues at time of treatment decision making process.  74 year old male with CKD, sp polio, paraplegia with associated neurogenic bladder s/p suprapubic catheter who was admitted s/p fall on 2/16.   CKD - ESRD, now s/p DEYA boston 2/17, on HD  ruled out cellulitis around suprapubic cath  2/20 s/p RRT for tremors and confusion -- pt with chronic tremors although notably worse  mrsa negative   2/20 CXR with clear lungs   SPC site with chronic/stable inflammatory changes, no drainage - no sign of SSTI  CTA abd with occlusion of b/l external iliac arteries and b/l superficial femoral arteries with distal reconstitution at popliteal arteries; patent three vessel runoff of RLE with 2 vessel runoff of LLE with occlusion of L mid anterior tibial artery with distal reconstitution  Bcx negative to date   mental status at baseline  Vascular following for worsening PAD with worsening ischemic changes   -3/3 s/p RLE angiogram b/l iliac artery stents     3/16 no tenderness at suprapubic catheter site  no visible erythema at catheter site on exam  UA w/ many epithelial cells, urine culture negative  s/p zosyn 3/14-3/16  SPC site remains without erythema but now noted with some tan crusting on dressing  abd pain less but still represent, will treat for possible SPC site infection  feels pain at SPC site, back and legs improved   3/23 reporting vomiting x5 episodes, no diarrhea or other focal sx  COVID/Flu/RSV negative  remains afebrile, WBC downtrended, nausea improved     Recommendations:   Continue Bactrim SS 1 tab PO Q12h (renally dosed) to finish 7d today 3/25  Follow CT Chest/Abd/Pelvis, pending   GI following   HD per renal    Monitor temps/WBC  Wound care following   Continue rest of care per primary team     Addendum:  CT Chest reviewed-noted RLL consolidative opacities, LLL opacities - pneumonia vs atelectasis, few ill defined nodular opacities in LLL infectious vs inflammatory   CTAP with subcutaneous gas and infiltration tracking along medial R buttock, possibly related to decubitus wound with gas tracking from the external environment, possible necrotizing air forming infection; rectal wall thickening suggestive of proctitis; b/l mild hydroureteronephrosis to level of bladder with no obstruction and diffuse urothelial thickening of b/l renal collecting systems and ureters  similar to 2/20  remains afebrile, WBC overall downtrended, reported decreased pain and no resp symptoms earlier,   Started on meropenem (renally dosed) for now based on prior cultures. MRSA screen has been negative.   Wound care/surgery evaluation       Bridgette Ramirez M.D.  [facility]  Available on [employer] TEAMS - *PREFERRED*  [phone]  After 5pm on weekdays and all day on weekends - please call [phone]     Thank you for consulting us and involving us in the management of this patients case. In addition to reviewing history, imaging, documents, labs, microbiology, took into account antibiotic stewardship, local antibiogram and infection control strategies and potential transmission issues at time of treatment decision making process.

## 2025-03-25 NOTE — PROGRESS NOTE ADULT - ASSESSMENT
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 The initial plan was that he would present to the [facility] ER Monday 2/17 for HD initiation. However, day of admission, he fell and sustained trauma to his knee. Called to consult for cough, elevated R hemidiaphragm.

## 2025-03-25 NOTE — PROGRESS NOTE ADULT - SUBJECTIVE AND OBJECTIVE BOX
Patient is a 74y old  Male who presents with a chief complaint of ESRD requiring HD, uremia (25 Mar 2025 13:55)      SUBJECTIVE / OVERNIGHT EVENTS: wife at bedside, daughter on speaker phone at bedside. ptn is pain free at the time of visit and states he wants to cont the pain regiment he is presently on. ct C/A/P revealed: RLL PNA, prob aspiration, stercoral proctitis, decubitus ulcer w possible progression to sacral OM, R IJ nonocclusive DVT. these findings d/w ptn ,his family, wound care, ACP, Vascular, Nephrology, ID, pUlm. meropenem initiated, Bactrim stopped. ptn states when he is cleared for DC, he wants to go home , not to [facility], not to SNF. daughter and wife aware.     MEDICATIONS  (STANDING):  albuterol/ipratropium for Nebulization 3 milliLiter(s) Nebulizer every 6 hours  aspirin  chewable 81 milliGRAM(s) Oral daily  atorvastatin 40 milliGRAM(s) Oral at bedtime  chlorhexidine 2% Cloths 1 Application(s) Topical daily  clopidogrel Tablet 75 milliGRAM(s) Oral daily  epoetin aleah-epbx (RETACRIT) Injectable 42274 Unit(s) IV Push <User Schedule>  hydrOXYzine hydrochloride 25 milliGRAM(s) Oral three times a day  ibuprofen  Tablet. 400 milliGRAM(s) Oral every 12 hours  lactulose Syrup 20 Gram(s) Oral once  meropenem  IVPB 500 milliGRAM(s) IV Intermittent every 12 hours  mupirocin 2% Ointment 1 Application(s) Topical <User Schedule>  oxyCODONE  ER Tablet 30 milliGRAM(s) Oral every 12 hours  pantoprazole    Tablet 40 milliGRAM(s) Oral before breakfast  polyethylene glycol 3350 17 Gram(s) Oral daily  povidone iodine 10% Solution 1 Application(s) Topical two times a day  senna 2 Tablet(s) Oral at bedtime  simethicone 80 milliGRAM(s) Chew every 6 hours    MEDICATIONS  (PRN):  acetaminophen     Tablet .. 650 milliGRAM(s) Oral every 6 hours PRN Temp greater or equal to 38C (100.4F), Mild Pain (1 - 3)  benzocaine/menthol Lozenge 1 Lozenge Oral three times a day PRN Sore Throat  bisacodyl 5 milliGRAM(s) Oral every 12 hours PRN Constipation  HYDROmorphone  Injectable 2 milliGRAM(s) IV Push every 4 hours PRN Severe Pain (7 - 10)  oxyCODONE    IR 10 milliGRAM(s) Oral every 4 hours PRN Moderate Pain (4 - 6)  sodium chloride 0.9% lock flush 10 milliLiter(s) IV Push every 1 hour PRN Pre/post blood products, medications, blood draw, and to maintain line patency      Vital Signs Last 24 Hrs  T(F): 98.3 (03-25-25 @ 15:50), Max: 98.3 (03-25-25 @ 15:50)  HR: 91 (03-25-25 @ 15:50) (77 - 91)  BP: 120/72 (03-25-25 @ 15:50) (98/61 - 120/72)  RR: 18 (03-25-25 @ 15:50) (14 - 18)  SpO2: 98% (03-25-25 @ 15:50) (97% - 100%)  Telemetry:   CAPILLARY BLOOD GLUCOSE        I&O's Summary    24 Mar 2025 07:01  -  25 Mar 2025 07:00  --------------------------------------------------------  IN: 240 mL / OUT: 2400 mL / NET: -2160 mL        PHYSICAL EXAM:  GENERAL: NAD, well-developed  HEAD:  Atraumatic, Normocephalic  EYES: EOMI, PERRLA, conjunctiva and sclera clear  NECK: Supple, No JVD  CHEST/LUNG: Clear to auscultation bilaterally; No wheeze  HEART: Regular rate and rhythm; No murmurs, rubs, or gallops  ABDOMEN: Soft, Nontender, Nondistended; Bowel sounds present  EXTREMITIES:  2+ Peripheral Pulses, No clubbing, cyanosis, or edema  PSYCH: AAOx3  NEUROLOGY: non-focal  SKIN: No rashes or lesions    LABS:                        9.9    10.94 )-----------( 245      ( 24 Mar 2025 07:13 )             33.4     03-24    129[L]  |  93[L]  |  34[H]  ----------------------------<  100[H]  4.6   |  21[L]  |  4.04[H]    Ca    7.5[L]      24 Mar 2025 07:13            Urinalysis Basic - ( 24 Mar 2025 07:13 )    Color: x / Appearance: x / SG: x / pH: x  Gluc: 100 mg/dL / Ketone: x  / Bili: x / Urobili: x   Blood: x / Protein: x / Nitrite: x   Leuk Esterase: x / RBC: x / WBC x   Sq Epi: x / Non Sq Epi: x / Bacteria: x        RADIOLOGY & ADDITIONAL TESTS:    Imaging Personally Reviewed:    Consultant(s) Notes Reviewed:      Care Discussed with Consultants/Other Providers:

## 2025-03-25 NOTE — PROGRESS NOTE ADULT - PROBLEM SELECTOR PLAN 2
Internal Medicine Progress Note    Admission date: 6/24/2022  Primary care physician: Oscar Wilkinson MD    Subjective  Brandy Eugene was seen and examined at bedside today.  was present during my examination. Brandy Eugene states that she is tired as the IV machine was beeping most of the night. Otherwise she feels fine. On discussion with nursing no issues. Review of Systems  There are no new complaints of chest pain, shortness of breath, abdominal pain, nausea, vomiting, diarrhea, constipation, headaches, fevers, chills, lightheadedness, dizziness, calf pain.     Hospital Medications  Current Facility-Administered Medications   Medication Dose Route Frequency Provider Last Rate Last Admin    latanoprost (XALATAN) 0.005 % ophthalmic solution 1 drop  1 drop Both Eyes Nightly Pavel Kaplan MD        therapeutic multivitamin-minerals 1 tablet  1 tablet Oral Daily Pavel Kaplan MD   1 tablet at 06/26/22 0747    sodium chloride flush 0.9 % injection 10 mL  10 mL IntraVENous 2 times per day Pavel Kaplan MD   10 mL at 06/25/22 0109    sodium chloride flush 0.9 % injection 10 mL  10 mL IntraVENous PRN Pavel Kaplan MD        0.9 % sodium chloride infusion   IntraVENous PRN Pavel Kaplan MD        potassium chloride (KLOR-CON M) extended release tablet 40 mEq  40 mEq Oral PRN Pavel Kaplan MD        Or   Gamaliel Mason potassium bicarb-citric acid (EFFER-K) effervescent tablet 40 mEq  40 mEq Oral PRN Pavel Kaplan MD        Or   Gamaliel Mason potassium chloride 10 mEq/100 mL IVPB (Peripheral Line)  10 mEq IntraVENous PRN Pavel Kaplan MD        enoxaparin (LOVENOX) injection 40 mg  40 mg SubCUTAneous Daily Pavel Kaplan MD   40 mg at 06/26/22 0748    ondansetron (ZOFRAN-ODT) disintegrating tablet 4 mg  4 mg Oral Q8H PRN Pavel Kaplan MD        Or    ondansetron (ZOFRAN) injection 4 mg  4 mg IntraVENous Q6H PRN Pavel Kaplan MD        senna (SENOKOT) tablet 8.6 mg  1 tablet Oral Daily PRN MD Gamaliel Schumacher acetaminophen (TYLENOL) tablet 650 mg  650 mg Oral Q6H PRN Ann Kelley MD   650 mg at 06/25/22 1836    Or    acetaminophen (TYLENOL) suppository 650 mg  650 mg Rectal Q6H PRN Ann Kelley MD        cefTRIAXone (ROCEPHIN) 1,000 mg in sterile water 10 mL IV syringe  1,000 mg IntraVENous Q24H Ann Kelley MD   1,000 mg at 06/26/22 0747    0.9 % sodium chloride infusion   IntraVENous Continuous Ann Kelley  mL/hr at 06/26/22 0746 New Bag at 06/26/22 0746       PRN Medications  sodium chloride flush, sodium chloride, potassium chloride **OR** potassium alternative oral replacement **OR** potassium chloride, ondansetron **OR** ondansetron, senna, acetaminophen **OR** acetaminophen    Objective  Most Recent Recorded Vitals  BP (!) 159/87   Pulse (!) 105   Temp 98.3 °F (36.8 °C) (Oral)   Resp 16   Ht 5' 5\" (1.651 m)   Wt 212 lb 8 oz (96.4 kg)   SpO2 95%   BMI 35.36 kg/m²   I/O last 3 completed shifts:  In: -   Out: 3050 [Urine:3050]  No intake/output data recorded. Physical Exam:  General: AAO to person, place, time, and purpose, NAD, no labored breathing  Eyes: conjunctivae/corneas clear, sclera non icteric. Skin: color, texture, and turgor normal, no rashes or lesions. Lungs: clear to auscultation bilaterally, no retractions or use of accessory muscles, no vocal fremitus, no rhonchi, no crackle, no rales  Heart: regular rate and regular rhythm, no murmur  Abdomen: soft, non-tender; bowel sounds normal; no masses,  no organomegaly  Extremities: atraumatic, no cyanosis, no edema  Neurologic: cranial nerves 2-12 grossly intact, no slurred speech.     Most Recent Labs  Lab Results   Component Value Date    WBC 11.8 (H) 06/26/2022    HGB 12.6 06/26/2022    HCT 38.1 06/26/2022     06/26/2022     06/26/2022    K 3.8 06/26/2022     06/26/2022    CREATININE 0.8 06/26/2022    BUN 16 06/26/2022    CO2 20 (L) 06/26/2022    GLUCOSE 121 (H) 06/26/2022    ALT 13 06/26/2022    AST 13 06/26/2022    INR 0.9 03/12/2016    TSH 9.030 (H) 08/26/2020    LABA1C 5.5 02/20/2020       *All labs in electric medical record were reviewed. Please see electronic chart for a more comprehensive set of labs    CT ABDOMEN PELVIS WO CONTRAST Additional Contrast? None    Result Date: 6/24/2022  EXAMINATION: CT OF THE ABDOMEN AND PELVIS WITHOUT CONTRAST 6/24/2022 4:09 pm TECHNIQUE: CT of the abdomen and pelvis was performed without the administration of intravenous contrast. Multiplanar reformatted images are provided for review. Automated exposure control, iterative reconstruction, and/or weight based adjustment of the mA/kV was utilized to reduce the radiation dose to as low as reasonably achievable. COMPARISON: None. HISTORY: ORDERING SYSTEM PROVIDED HISTORY: hx of kidney stone, right flank pain TECHNOLOGIST PROVIDED HISTORY: Reason for exam:->hx of kidney stone, right flank pain Additional Contrast?->None Decision Support Exception - unselect if not a suspected or confirmed emergency medical condition->Emergency Medical Condition (MA) FINDINGS: Lower Chest: Streaky opacities in the lung bases may represent atelectasis and/or scarring. The heart is normal in size. No pleural or pericardial effusion. Organs: Liver: Unremarkable. Gallbladder: Unremarkable. Pancreas: Unremarkable. Spleen:  Unremarkable. Adrenals: Unremarkable. Kidneys: The kidneys are normal in size and contour. Multiple intrarenal calculi are seen. The largest in the right renal pelvis measures approximately 12 mm. Gas is seen within the mildly dilated right renal pelvis. Mild perinephric fat stranding is also seen. Small intrarenal calculi are seen along the lower pole calices of the left kidney, with the largest measuring 5 mm. No ureterolithiasis is seen on either side. GI/Bowel: Prominent diverticulosis in the sigmoid colon. No evidence of acute diverticulitis. No bowel wall thickening or obstruction. Normal appendix. Pelvis:  The urinary bladder is unremarkable. Within limits of the CT technique, the uterus and the adnexa are grossly unremarkable. Peritoneum/Retroperitoneum: Mild calcified atherosclerosis is seen in the aorta. No aneurysm. No lymphadenopathy. No free air or free fluid is seen. Bones/Soft Tissues:  No fracture or joint dislocation. Prominent S-shaped scoliotic curvature of the thoracolumbar spine is seen. 1. MILD RIGHT HYDRONEPHROSIS with a 12 mm calculus in the renal pelvis. 2. LOCULES OF GAS within the right renal pelvis are of an unclear etiology. Barring any recent surgical intervention to account for the gas, findings are concerning for INFECTION. 3. Intrarenal calculi in the left kidney. No left-sided hydronephrosis. 4. Prominent sigmoid diverticulosis without diverticulitis. RECOMMENDATIONS: Unavailable     XR ABDOMEN (KUB) (SINGLE AP VIEW)    Result Date: 6/25/2022  EXAMINATION: ONE SUPINE XRAY VIEW(S) OF THE ABDOMEN 6/25/2022 1:20 pm COMPARISON:  film from previous study of 06/24/2022 HISTORY: ORDERING SYSTEM PROVIDED HISTORY: nephrolithiasis TECHNOLOGIST PROVIDED HISTORY: Reason for exam:->nephrolithiasis FINDINGS: Bowel gas pattern is unremarkable. No ileus or obstruction. Stable 12 mm calcification projected over the renal pelvis of the right kidney. There are multiple smaller calcifications overlying the medial inferior left kidney. Left convexity thoracolumbar scoliosis. Bone density is within normal limits for age. No free air. No organomegaly. 1.  Bilateral nephrolithiasis, stable 12 mm calculus in the right renal pelvis region. 2.  No evidence of ileus or mechanical obstruction. No free air.            MICROBIOLOGY:  BLOOD CX #1  Recent Labs     06/24/22  1753   BC Gram stain performed from blood culture bottle media  Gram negative rods  Previously positive blood culture called  *     BLOOD CX #2  Recent Labs     06/24/22  1753   BLOODCULT2 Gram stain performed from blood culture bottle media  Gram negative rods  *       Assessment/Plan  Starla Erwin is a 69y.o. year old female who presented on 6/24/2022 and is being treated for Ureteral stone . Complete Problem List:    Patient Active Problem List    Diagnosis Date Noted    Ureteral stone 06/24/2022    Non compliance with medical treatment 08/25/2021    Morbidly obese ([address] 08/25/2020    Primary open angle glaucoma (POAG) 02/20/2020    Hyperglycemia 02/20/2020    Elevated lipoprotein(a) 02/20/2020    Osteoarthritis 08/08/2019    Cellulitis 01/22/2015    Essential hypertension 07/20/2004     · Sepsis from UTI with E. Coli Bacteremia  ? Improving  ? IVF  ? Blood culture 4/4 positive   ? ID consulted, defer Abx to them   ? ? picc line placement  ? Ok to remove richter  · Nephrolithiasis/Right Hydronephrosis  ? 12mm in right renal pelvis  ? Urology consult appreciated  ? Outpatient management. · Routine labs in am  · Lovenox for DVT prophylaxis. · Please see orders for further management and care. · Discharge: to home with [facility] likely      Electronically signed by Zulay Dixon MD on 6/26/2022 at 8:27 AM      NOTE:  This report was transcribed using voice recognition software. Every effort was made to ensure accuracy; however, inadvertent computerized transcription errors may be present. Nondisplaced L medial femoral condyle fx   s/p RLE angiogram w/ b/l iliac artery stents 3/3  ortho following  Wound from Left knee immobilizer is dressed and healing.  New ecchymoses in b/l feet  As per vascular: no further revascularization options.  podiatry consulted:  No acute podiatric surgical intervention at this time.

## 2025-03-25 NOTE — PROGRESS NOTE ADULT - PROBLEM SELECTOR PLAN 4
-Per vascular  -S/p b/l iliac stent placement on 3/3/25  -Podiatry f/u.  3/9: has cyanosed digits:  defer to vascular  /: vascular following  3/18: digits remains cyanosed and tip gangrenous:  defer to primary team  3/20: vascular following  3/22; seems to be doing  ok   per vascular  3/23: left foot wrapped:  rigth foot digits are gangrenous  3/24: the rigth leg looks better:  the left leg is wrapped:  3/25: cont current rx

## 2025-03-25 NOTE — PROGRESS NOTE ADULT - SUBJECTIVE AND OBJECTIVE BOX
Date of Service: 03-25-25 @ 13:55    Patient is a 74y old  Male who presents with a chief complaint of ESRD requiring HD, uremia (25 Mar 2025 11:27)      Any change in ROS: seems OK: no sob;       MEDICATIONS  (STANDING):  albuterol/ipratropium for Nebulization 3 milliLiter(s) Nebulizer every 6 hours  aspirin  chewable 81 milliGRAM(s) Oral daily  atorvastatin 40 milliGRAM(s) Oral at bedtime  chlorhexidine 2% Cloths 1 Application(s) Topical daily  clopidogrel Tablet 75 milliGRAM(s) Oral daily  epoetin aleah-epbx (RETACRIT) Injectable 43209 Unit(s) IV Push <User Schedule>  hydrOXYzine hydrochloride 25 milliGRAM(s) Oral three times a day  ibuprofen  Tablet. 400 milliGRAM(s) Oral every 12 hours  mupirocin 2% Ointment 1 Application(s) Topical <User Schedule>  oxyCODONE  ER Tablet 30 milliGRAM(s) Oral every 12 hours  pantoprazole    Tablet 40 milliGRAM(s) Oral before breakfast  polyethylene glycol 3350 17 Gram(s) Oral daily  povidone iodine 10% Solution 1 Application(s) Topical two times a day  senna 2 Tablet(s) Oral at bedtime  trimethoprim   80 mG/sulfamethoxazole 400 mG 1 Tablet(s) Oral two times a day    MEDICATIONS  (PRN):  acetaminophen     Tablet .. 650 milliGRAM(s) Oral every 6 hours PRN Temp greater or equal to 38C (100.4F), Mild Pain (1 - 3)  benzocaine/menthol Lozenge 1 Lozenge Oral three times a day PRN Sore Throat  bisacodyl 5 milliGRAM(s) Oral every 12 hours PRN Constipation  HYDROmorphone  Injectable 2 milliGRAM(s) IV Push every 4 hours PRN Severe Pain (7 - 10)  oxyCODONE    IR 10 milliGRAM(s) Oral every 4 hours PRN Moderate Pain (4 - 6)  sodium chloride 0.9% lock flush 10 milliLiter(s) IV Push every 1 hour PRN Pre/post blood products, medications, blood draw, and to maintain line patency    Vital Signs Last 24 Hrs  T(C): 36.7 (25 Mar 2025 07:52), Max: 36.8 (25 Mar 2025 00:10)  T(F): 98 (25 Mar 2025 07:52), Max: 98.2 (25 Mar 2025 00:10)  HR: 77 (25 Mar 2025 07:52) (77 - 92)  BP: 98/63 (25 Mar 2025 07:52) (98/61 - 106/68)  BP(mean): 74 (25 Mar 2025 07:52) (74 - 74)  RR: 14 (25 Mar 2025 07:52) (14 - 18)  SpO2: 100% (25 Mar 2025 07:52) (96% - 100%)    Parameters below as of 25 Mar 2025 07:52  Patient On (Oxygen Delivery Method): room air        I&O's Summary    24 Mar 2025 07:01  -  25 Mar 2025 07:00  --------------------------------------------------------  IN: 240 mL / OUT: 2400 mL / NET: -2160 mL          Physical Exam:   GENERAL: NAD, well-groomed, well-developed  HEENT: VINNY/   Atraumatic, Normocephalic  ENMT: No tonsillar erythema, exudates, or enlargement; Moist mucous membranes, Good dentition, No lesions  NECK: Supple, No JVD, Normal thyroid  CHEST/LUNG: Clear to auscultaion  CVS: Regular rate and rhythm; No murmurs, rubs, or gallops  GI: : Soft, Nontender, Nondistended; Bowel sounds present  NERVOUS SYSTEM:  Alert & Oriented X3  EXTREMITIES:  - edema ; gangrenous digits  LYMPH: No lymphadenopathy noted  SKIN: No rashes or lesions  ENDOCRINOLOGY: No Thyromegaly  PSYCH: Appropriate    Labs:                              9.9    10.94 )-----------( 245      ( 24 Mar 2025 07:13 )             33.4     03-24    129[L]  |  93[L]  |  34[H]  ----------------------------<  100[H]  4.6   |  21[L]  |  4.04[H]    Ca    7.5[L]      24 Mar 2025 07:13      CAPILLARY BLOOD GLUCOSE              Urinalysis Basic - ( 24 Mar 2025 07:13 )    Color: x / Appearance: x / SG: x / pH: x  Gluc: 100 mg/dL / Ketone: x  / Bili: x / Urobili: x   Blood: x / Protein: x / Nitrite: x   Leuk Esterase: x / RBC: x / WBC x   Sq Epi: x / Non Sq Epi: x / Bacteria: x      rad< from: Xray Chest 1 View AP/PA (03.23.25 @ 18:15) >    CLINICAL INDICATION: Fall    TECHNIQUE: AP radiograph of the chest.    FINDINGS:  Right-sided tunneled hemodialysis catheter terminates in the SVC  Heart is not accurately assessed on this AP projection  Visualized lungs are clear. No pneumothorax. No sizable pleural effusion.    IMPRESSION:    Negative for acute cardiopulmonary process.    --- End of Report ---          < end of copied text >        RECENT CULTURES:        RESPIRATORY CULTURES:          Studies  Chest X-RAY  CT SCAN Chest   Venous Dopplers: LE:   CT Abdomen  Others

## 2025-03-25 NOTE — PROGRESS NOTE ADULT - SUBJECTIVE AND OBJECTIVE BOX
[facility]  SABINO Griffin Y. Patel, S. Shah, G. Casimir  [phone]  ([phone] - weekdays after 5pm and weekends)    Name: BRIAN DEMPSEY  Age/Gender: 74y Male  MRN: [id number]    Interval History:  Patient seen and examined this morning.   States pain overall less, no fevers.  No new complaints noted.  Notes reviewed  No concerning overnight events  Afebrile   Allergies: No Known Allergies      Objective:  Vitals:   T(F): 98 (03-25-25 @ 07:52), Max: 98.2 (03-25-25 @ 00:10)  HR: 77 (03-25-25 @ 07:52) (77 - 92)  BP: 98/63 (03-25-25 @ 07:52) (98/61 - 124/74)  RR: 14 (03-25-25 @ 07:52) (14 - 18)  SpO2: 100% (03-25-25 @ 07:52) (96% - 100%)  Physical Examination:  General: no acute distress  HEENT: normocephalic, atraumatic  Respiratory: breathing comfortably  Cardiovascular: S1 and S2 present  Gastrointestinal: NT, ND, +SPC  Extremities: ischemic changes LE   Skin: no visible rash    Laboratory Studies:  CBC:                       9.9    10.94 )-----------( 245      ( 24 Mar 2025 07:13 )             33.4     WBC Trend:  10.94 03-24-25 @ 07:13    CMP: 03-24    129[L]  |  93[L]  |  34[H]  ----------------------------<  100[H]  4.6   |  21[L]  |  4.04[H]    Ca    7.5[L]      24 Mar 2025 07:13    Creatinine: 4.04 mg/dL (03-24-25 @ 07:13)    Microbiology: reviewed   Culture - Urine (collected 03-15-25 @ 07:08)  Source: Catheterized Catheterized  Final Report (03-16-25 @ 08:38):    <10,000 CFU/mL Normal Urogenital Catie    03-23-25 @ 14:18 SARS-CoV-2 NotDetec/Influenza A NotDetec/Influenza B NotDetec/RSV NotDetec    Radiology: reviewed     Medications:  acetaminophen     Tablet .. 650 milliGRAM(s) Oral every 6 hours PRN  albuterol/ipratropium for Nebulization 3 milliLiter(s) Nebulizer every 6 hours  aspirin  chewable 81 milliGRAM(s) Oral daily  atorvastatin 40 milliGRAM(s) Oral at bedtime  benzocaine/menthol Lozenge 1 Lozenge Oral three times a day PRN  bisacodyl 5 milliGRAM(s) Oral every 12 hours PRN  chlorhexidine 2% Cloths 1 Application(s) Topical daily  clopidogrel Tablet 75 milliGRAM(s) Oral daily  epoetin aleah-epbx (RETACRIT) Injectable 24227 Unit(s) IV Push <User Schedule>  HYDROmorphone  Injectable 2 milliGRAM(s) IV Push every 4 hours PRN  hydrOXYzine hydrochloride 25 milliGRAM(s) Oral three times a day  ibuprofen  Tablet. 400 milliGRAM(s) Oral every 12 hours  mupirocin 2% Ointment 1 Application(s) Topical <User Schedule>  oxyCODONE    IR 10 milliGRAM(s) Oral every 4 hours PRN  oxyCODONE  ER Tablet 30 milliGRAM(s) Oral every 12 hours  pantoprazole    Tablet 40 milliGRAM(s) Oral before breakfast  polyethylene glycol 3350 17 Gram(s) Oral daily  povidone iodine 10% Solution 1 Application(s) Topical two times a day  senna 2 Tablet(s) Oral at bedtime  sodium chloride 0.9% lock flush 10 milliLiter(s) IV Push every 1 hour PRN  trimethoprim   80 mG/sulfamethoxazole 400 mG 1 Tablet(s) Oral two times a day    Current Antimicrobials:  trimethoprim   80 mG/sulfamethoxazole 400 mG 1 Tablet(s) Oral two times a day    Prior/Completed Antimicrobials:  piperacillin/tazobactam IVPB.  piperacillin/tazobactam IVPB.  piperacillin/tazobactam IVPB.-  piperacillin/tazobactam IVPB.-  trimethoprim  160 mG/sulfamethoxazole 800 mG  vancomycin  IVPB  vancomycin  IVPB

## 2025-03-25 NOTE — PROGRESS NOTE ADULT - ASSESSMENT
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  came in after fall and for HD.     2/20 CTH neg   \Na 123 --> now 133   A1c 5.7   TSH WNL 3.46   o/e 2/21 AAOx2, uppers 4/5, lowers limited .  2/23; family bedside upset that he has pain in leg after he was moved.  patient going for imaging now.   s/p R IJ permacath by IR 2/25 2/27 AAOx3   sopoke with family bedisde 3/17 wife says mental status okay, sometimes up and down but good   3/18 was told he has "new neuropathy" spoke iwth patient and wife at bedside. states its the same from before not a new issue.    3/21 mental status stable. c/o pubic cathter   3/23 c/o nausea today   no new changes     Imrpssion  1) AMS, waxing and waing , likely multifactorial from infection, metabolic/electrolyte derrangements/ delerium / medication effect , ureemia which is imporving   2) polio  3) fall 2/2 weakness  4) hyponatremia to 123 2/20  improved 133 -->136;   back down to 129     - Na 129 on most recent blood work.  slow correcttion  ; f/u renal    - AVF outpatient     f/u vascular; no vascluar options ; [facility]   - on DAPT   - was getting oxycodone ER 30mg BID and oxy PRN IR i10 and dilauded PRN ;   consider gabapentin 200mg TID or lyrica 50mg BID fo nueorpathy if no objection ; patient states he still has pain but its better.  vascular has seen patient.  poor prognosis of LLE   - f/u psych   - limit sedating meds   - continue to monitor and correct metabolic derrangements .  - delerium precuations.   - frequent re orientation  - check b12, RPR, ammonia level if never checked    - PT/OT   - check FS, glucose control <180  - GI/DVT ppx  - Thank you for allowing me to participate in the care of this patient. Call with questions.   dc planning       Paul Limon MD  Vascular Neurology  Office: [phone]

## 2025-03-25 NOTE — PROGRESS NOTE ADULT - SUBJECTIVE AND OBJECTIVE BOX
Subjective: Patient seen and examined. No new events except as noted.     REVIEW OF SYSTEMS:    CONSTITUTIONAL: +weakness, fevers or chills  EYES/ENT: No visual changes;  No vertigo or throat pain   NECK: No pain or stiffness  RESPIRATORY: No cough, wheezing, hemoptysis; No shortness of breath  CARDIOVASCULAR: No chest pain or palpitations  GASTROINTESTINAL: No abdominal or epigastric pain. No nausea, vomiting, or hematemesis; No diarrhea or constipation. No melena or hematochezia.  GENITOURINARY: No dysuria, frequency or hematuria  NEUROLOGICAL: No numbness or weakness  SKIN: No itching, burning, rashes, or lesions   All other review of systems is negative unless indicated above.    MEDICATIONS:  MEDICATIONS  (STANDING):  albuterol/ipratropium for Nebulization 3 milliLiter(s) Nebulizer every 6 hours  aspirin  chewable 81 milliGRAM(s) Oral daily  atorvastatin 40 milliGRAM(s) Oral at bedtime  chlorhexidine 2% Cloths 1 Application(s) Topical daily  clopidogrel Tablet 75 milliGRAM(s) Oral daily  epoetin aleah-epbx (RETACRIT) Injectable 61049 Unit(s) IV Push <User Schedule>  hydrOXYzine hydrochloride 25 milliGRAM(s) Oral three times a day  ibuprofen  Tablet. 400 milliGRAM(s) Oral every 12 hours  mupirocin 2% Ointment 1 Application(s) Topical <User Schedule>  oxyCODONE  ER Tablet 30 milliGRAM(s) Oral every 12 hours  pantoprazole    Tablet 40 milliGRAM(s) Oral before breakfast  polyethylene glycol 3350 17 Gram(s) Oral daily  povidone iodine 10% Solution 1 Application(s) Topical two times a day  senna 2 Tablet(s) Oral at bedtime  trimethoprim   80 mG/sulfamethoxazole 400 mG 1 Tablet(s) Oral two times a day      PHYSICAL EXAM:  T(C): 36.7 (03-25-25 @ 07:52), Max: 36.8 (03-25-25 @ 00:10)  HR: 77 (03-25-25 @ 07:52) (77 - 92)  BP: 98/63 (03-25-25 @ 07:52) (98/61 - 124/74)  RR: 14 (03-25-25 @ 07:52) (14 - 18)  SpO2: 100% (03-25-25 @ 07:52) (96% - 100%)  Wt(kg): --  I&O's Summary    24 Mar 2025 07:01  -  25 Mar 2025 07:00  --------------------------------------------------------  IN: 240 mL / OUT: 2400 mL / NET: -2160 mL          Appearance: NAD  HEENT:  Dry oral mucosa, PERRL, EOMI	  Lymphatic: No lymphadenopathy  Cardiovascular: Normal S1 S2, No JVD, No murmurs, No edema  Respiratory: Lungs clear to auscultation	  Psychiatry: A & O x 3, Mood & affect appropriate  Skin: No rashes, No ecchymoses, No cyanosis	  Neurologic: Non-focal  GI/Abd: Soft, obese, nontender. Dressing to [facility] C/D/I. Suprapubic catheter in place  Ext: Palp femoral pulses bilat. BLE atrophic, minimal dorsi/plantarflexion bilaterally. Sensation grossly intact. LLE edematous compared to R, erythema to right toes/forefoot. mottling of right toes/forefoot/heel  LLE:  small superficial abrasion, +edema and +ecchymosis over L knee  +TTP over L knee, no TTP along remainder of extremity; compartments soft  Limited ROM at knee 2/2 pain  No gross varus/valgus laxity, but assessment limited 2/2 pain  Motor: TA/EHL/GS/FHL intact  Sensory: DP/SP/Tib/Jordan/Saph SILT  +DP pulse (symmetric relative to contralateral side), WWP   pressure wound from the LLE immobilizer posteriorly proximal to the knee.  Vascular: Peripheral pulses palpable 2+ bilaterally      LABS:    CARDIAC MARKERS:                                9.9    10.94 )-----------( 245      ( 24 Mar 2025 07:13 )             33.4     03-24    129[L]  |  93[L]  |  34[H]  ----------------------------<  100[H]  4.6   |  21[L]  |  4.04[H]    Ca    7.5[L]      24 Mar 2025 07:13      proBNP:   Lipid Profile:   HgA1c:   TSH:             TELEMETRY: 	    ECG:  	  RADIOLOGY:   DIAGNOSTIC TESTING:  [ ] Echocardiogram:  [ ]  Catheterization:  [ ] Stress Test:    OTHER:

## 2025-03-26 NOTE — PROGRESS NOTE ADULT - PROBLEM SELECTOR PLAN 6
-Monitor leukocytosis/fever curve  -CXR as above, no clear infiltrate  -ID f/u.  3/24  on bactrim till tomorrow:  WBC Is mildly elevated  : no fever  3/25: bactrim finishing today per ID  3/26: now on meropenem: ct sca chest reviewed;

## 2025-03-26 NOTE — PROGRESS NOTE ADULT - SUBJECTIVE AND OBJECTIVE BOX
[facility]  SABINO Griffin Y. Patel, S. Shah, G. Julio César  [phone]  ([phone] - weekdays after 5pm and weekends)    Name: BRIAN DEMPSEY  Age/Gender: 74y Male  MRN: [id number]    Interval History:  Patient seen and examined this morning.   States he has overall been feeling much better.  States pain is better with pressure offloading.  Denies fever, chest pain, cough, dyspnea.   States nausea intermittent, no vomiting or diarrhea.   Notes reviewed  No concerning overnight events  Afebrile   Allergies: No Known Allergies      Objective:  Vitals:   T(F): 97.2 (03-26-25 @ 10:30), Max: 98.4 (03-25-25 @ 23:41)  HR: 86 (03-26-25 @ 10:30) (84 - 91)  BP: 113/69 (03-26-25 @ 10:30) (113/69 - 154/52)  RR: 17 (03-26-25 @ 10:30) (17 - 18)  SpO2: 98% (03-26-25 @ 10:30) (96% - 98%)  Physical Examination:  General: no acute distress  HEENT: normocephalic, atraumatic  Respiratory: decreased breath sounds  Cardiovascular: S1S2 present, normal rate  Gastrointestinal: NT, ND, +SPC  Extremities: ischemic changes LE, LE edema  Skin: no visible rash    Laboratory Studies:  CBC:   WBC Trend:  10.94 03-24-25 @ 07:13    CMP:   Creatinine: 4.04 mg/dL (03-24-25 @ 07:13)    Microbiology: reviewed   Culture - Urine (collected 03-15-25 @ 07:08)  Source: Catheterized Catheterized  Final Report (03-16-25 @ 08:38):    <10,000 CFU/mL Normal Urogenital Catie    03-23-25 @ 14:18 SARS-CoV-2 NotDetec/Influenza A NotDetec/Influenza B NotDetec/RSV NotDetec    Radiology: reviewed     Medications:  acetaminophen     Tablet .. 650 milliGRAM(s) Oral every 6 hours PRN  albuterol/ipratropium for Nebulization 3 milliLiter(s) Nebulizer every 6 hours  aspirin  chewable 81 milliGRAM(s) Oral daily  atorvastatin 40 milliGRAM(s) Oral at bedtime  benzocaine/menthol Lozenge 1 Lozenge Oral three times a day PRN  bisacodyl 5 milliGRAM(s) Oral every 12 hours PRN  chlorhexidine 2% Cloths 1 Application(s) Topical daily  clopidogrel Tablet 75 milliGRAM(s) Oral daily  epoetin aleah-epbx (RETACRIT) Injectable 53211 Unit(s) IV Push <User Schedule>  HYDROmorphone  Injectable 2 milliGRAM(s) IV Push every 4 hours PRN  hydrOXYzine hydrochloride 25 milliGRAM(s) Oral three times a day  ibuprofen  Tablet. 400 milliGRAM(s) Oral every 12 hours  lactulose Syrup 20 Gram(s) Oral once  meropenem  IVPB 500 milliGRAM(s) IV Intermittent every 12 hours  mineral oil enema 133 milliLiter(s) Rectal once  mupirocin 2% Ointment 1 Application(s) Topical <User Schedule>  oxyCODONE    IR 10 milliGRAM(s) Oral every 4 hours PRN  oxyCODONE  ER Tablet 30 milliGRAM(s) Oral every 12 hours  pantoprazole    Tablet 40 milliGRAM(s) Oral before breakfast  polyethylene glycol 3350 17 Gram(s) Oral daily  povidone iodine 10% Solution 1 Application(s) Topical two times a day  senna 2 Tablet(s) Oral at bedtime  simethicone 80 milliGRAM(s) Chew every 6 hours  sodium chloride 0.9% lock flush 10 milliLiter(s) IV Push every 1 hour PRN    Current Antimicrobials:  meropenem  IVPB 500 milliGRAM(s) IV Intermittent every 12 hours    Prior/Completed Antimicrobials:  piperacillin/tazobactam IVPB.  piperacillin/tazobactam IVPB.  piperacillin/tazobactam IVPB.-  piperacillin/tazobactam IVPB.-  trimethoprim   80 mG/sulfamethoxazole 400 mG  trimethoprim  160 mG/sulfamethoxazole 800 mG  vancomycin  IVPB  vancomycin  IVPB

## 2025-03-26 NOTE — PROGRESS NOTE ADULT - ASSESSMENT
74M with history of HTN, polio with multiple subsequent problems including LE weakness requiring wheelchair, neurogenic bladder s/p suprapubic catheter, and colostomy s/p iatrogenic rectal injury 2/2023, and CKD 5 presenting to the ED with weakness and falls. Admitted for initiation of HD now s/p R IJ shiley placement on 2/17/25 and conversion to tunneled catheter on 2/25. Vascular surgery initially consulted for AVF creation; however, patient's worsening PAD with worsening ischemic changes is the more urgent issue now s/p b/l iliac stent placement on 3/3/25.     Wound Consult requested to assist w/ management of:  Sacral and Rt Ischial Unstageable Pressure Injury  Left Buttock DTI  BLE severe PAD w/ extensive wounds    Sacrum- pack w/ 1/4 Dakin's moistened packing  Buttocks/ Thighs continue w/ TRIAD BID and prn soiling        Continue w/ attends under pads and Pericare as per protocol  Lt knee/ calf wounds- Medihoney dressing QD  Feet /ankle skin changes- as per podiatry  Appreciate Vascular input- continue to follow up  Ortho Follow up for Knee fx  Feet f/u as per Podiatry/ Vascular  BLE elevation & Compression  Abx per Medicine/ ID  Consider MRI vs CT to r/o OM of sacrum  Consider Palliative for GOC and pain mngt  Moisturize intact skin w/ SWEEN cream BID  Nutrition Consult for optimization in pt w/ Moderate Protein Calorie Malnutrition,        encourage high quality protein, sandy/ prosource, MVI & Vit C to promote wound healing  Continue turning and positioning w/ offloading assistive devices as per protocol  Waffle Cushion to chair when oob to chair  Continue w/ low air loss pressure redistribution bed surface   Pt will need Group 2 mattress on hospital al bed and ROHO cushion for wheel chair upon discharge home  Care as per medicine, will follow w/ you  Upon discharge f/u as outpatient at [facility] [address] [facility] [phone]  Seen w/ attng & RN and D/w team  Thank you for this consult  Danica Humphreys PA-C CWS [id number]  Nights/ Weekends/ Holidays please call:  General Surgery Consult pager [phone]) for emergencies  Wound PT for multilayer leg wrapping or VAC issues ([phone])   I spent  50minutes face to face w/ this pt of which more than 50% of the time was spent counseling & coordinating care of this pt.

## 2025-03-26 NOTE — PROGRESS NOTE ADULT - PROBLEM SELECTOR PLAN 1
pulm wise seems pretty good:  remain son room air:   initial sob has resolved:  adv to do  incentive spirometry  :  always in bed:  high risk for pneumonia:  dw pt    3/22: seems to be doing ok :  on room air:   ct scan chest reviewed   seems to be comfortable'    3/23: rpt cxr today  : on room air;   no sob:  no wheezing;   last chest xray three weeks ago     3/24/ ;he had multiple vomitings yesterday  : repeat cxr yesterday was  normal : did not aspirate:  defer to primary care    3/25: awaiting ct chest  : on room air:  no sob:  no wheezing  3/26: ct scan chest suggestive of pneumonia:  and some buttock ? abscess with air  : id following:  ct scan chest reviewed: on  meropenem : he remains on room air:  he may have aspirated when he vomited few days ago

## 2025-03-26 NOTE — CONSULT NOTE ADULT - SUBJECTIVE AND OBJECTIVE BOX
Alessandro Eaton MD  Cardiology Fellow  [phone]  All Cardiology service information can be found 24/7 on [email or web address], password: [email or web address]    Patient seen and evaluated at bedside    HPI:  74 year-old man with history of multiple medical issues including polio with associated neurogenic bladder/suprapubic catheter, iatrogenic rectal rupture requiring colostomy 2/2023, and CKD5 (had wanted to hold off HD initiation in past), frequent admissions for SONDRA and uremia, presenting with a fall, now initiated of iHD. Patient underwent RIJ permacath 2/25 with IR. Also noted to have femoral condyle fracture, managed with left knee immobilizer. Had a RLE angiogram with b/l iliac artery stents 3/3. Pt started on meropenem.    B/l UE venous duplex 2/17/25: No duplex evidence of acute deep venous thrombus (DVT) in the right and left upper extremity. Right basilic proximal forearm and left cephalic distal arm, antecubital fossa, proximal forearm are thrombosed.  Vein mapping as above.    CTc/a/p: RLL pneumonia with debris in trachea. Linear nonocclusive filling defect within RIJ vein (residual fibrin sheath or nonocclusive thrombus). SubQ gas and infiltration along medial right buttock, possible decubitus wound. Rectal wall thickening. B/L mild hydroureteronephrosis which is stable. Urothelial thickening.     Cardiac Meds: ASA, plavix, atorva 40    PMHx:   Polio    Diabetes    Pulmonary hypertension    Hypertension    H/O urinary retention    HLD (hyperlipidemia)    BPH with obstruction/lower urinary tract symptoms    Type 2 diabetes mellitus    Erectile dysfunction    Bladder outlet obstruction    Presence of suprapubic catheter    Colostomy status    DVT, lower extremity    H/O cardiac murmur        PSHx:   No significant past surgical history    S/P colostomy    Suprapubic catheter    H/O total hip arthroplasty, right    Presence of internal fixation gianluca in upper extremity    S/P ORIF (open reduction internal fixation) fracture    S/P cataract surgery    H/O shoulder surgery        Allergies:  No Known Allergies      Current Medications:   acetaminophen     Tablet .. 650 milliGRAM(s) Oral every 6 hours PRN  albuterol/ipratropium for Nebulization 3 milliLiter(s) Nebulizer every 6 hours  aspirin  chewable 81 milliGRAM(s) Oral daily  atorvastatin 40 milliGRAM(s) Oral at bedtime  benzocaine/menthol Lozenge 1 Lozenge Oral three times a day PRN  bisacodyl 5 milliGRAM(s) Oral every 12 hours PRN  chlorhexidine 2% Cloths 1 Application(s) Topical daily  clopidogrel Tablet 75 milliGRAM(s) Oral daily  epoetin aleah-epbx (RETACRIT) Injectable 28801 Unit(s) IV Push <User Schedule>  HYDROmorphone  Injectable 2 milliGRAM(s) IV Push every 4 hours PRN  hydrOXYzine hydrochloride 25 milliGRAM(s) Oral three times a day  ibuprofen  Tablet. 400 milliGRAM(s) Oral every 12 hours  lactulose Syrup 20 Gram(s) Oral once  meropenem  IVPB 500 milliGRAM(s) IV Intermittent every 12 hours  mineral oil enema 133 milliLiter(s) Rectal once  mupirocin 2% Ointment 1 Application(s) Topical <User Schedule>  oxyCODONE    IR 10 milliGRAM(s) Oral every 4 hours PRN  oxyCODONE  ER Tablet 30 milliGRAM(s) Oral every 12 hours  pantoprazole    Tablet 40 milliGRAM(s) Oral before breakfast  polyethylene glycol 3350 17 Gram(s) Oral daily  povidone iodine 10% Solution 1 Application(s) Topical two times a day  senna 2 Tablet(s) Oral at bedtime  simethicone 80 milliGRAM(s) Chew every 6 hours  sodium chloride 0.9% lock flush 10 milliLiter(s) IV Push every 1 hour PRN      FAMILY HISTORY:  FH: type 2 diabetes          REVIEW OF SYSTEMS:  CONSTITUTIONAL: No weakness, fevers or chills  EYES/ENT: No visual changes;  No dysphagia  NECK: No pain or stiffness  RESPIRATORY: No cough, wheezing, hemoptysis; No shortness of breath  CARDIOVASCULAR: No chest pain or palpitations; No lower extremity edema  GASTROINTESTINAL: No abdominal or epigastric pain. No nausea, vomiting, or hematemesis; No diarrhea or constipation. No melena or hematochezia.  BACK: No back pain  GENITOURINARY: No dysuria, frequency or hematuria  NEUROLOGICAL: No numbness or weakness  SKIN: No itching, burning, rashes, or lesions   All other review of systems is negative unless indicated above.    Physical Exam:  T(F): 97.9 (03-26), Max: 98.4 (03-25)  HR: 87 (03-26) (84 - 91)  BP: 154/52 (03-26) (120/72 - 154/52)  RR: 18 (03-26)  SpO2: 97% (03-26)  Appearance: NAD  HEENT:  Dry oral mucosa, PERRL, EOMI	  Lymphatic: No lymphadenopathy  Cardiovascular: Normal S1 S2, No JVD, No murmurs, No edema  Respiratory: Lungs clear to auscultation	  Psychiatry: A & O x 3, Mood & affect appropriate  Skin: No rashes, No ecchymoses, No cyanosis	  Neurologic: Non-focal  GI/Abd: Soft, obese, nontender. Dressing to Q C/D/I. Suprapubic catheter in place  Ext: Palp femoral pulses bilat. BLE atrophic, minimal dorsi/plantarflexion bilaterally. Sensation grossly intact. LLE edematous compared to R, erythema to right toes/forefoot. mottling of right toes/forefoot/heel    CXR: Personally reviewed    Labs: Personally reviewed

## 2025-03-26 NOTE — PROGRESS NOTE ADULT - ASSESSMENT
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 He is well known to me from multiple recent admisions  s/p admissions at [facility] 12/20-12/25/24 and 2/4-2/7 with SONDRA on CKD with associated uremic symptoms.   At the last admission he had expressed strong interest to try to hold off with HD intiation but he has been getting worse clinically at home.  His SONDRA and uremic symptoms significantly improved after the first admission; they improved a to mild extent during the 2nd admission to the point where he was able to be discharged without HD. Since then, however, he has worsened further.   He has been suffering from progressively worsening diffuse pruritus, loss of appetite, and generalized weakness and falls.   His nephrologist and PCP has been speaking with him and his family over the past few days,   The initial plan was that he would present to the [facility] ER Monday 2/17 (ie tomorrow) for HD initiation. Today, however, he fell and sustained trauma to his knee. Given the fall and concern for knee fracture, he presented to the ER today. multiple xrays show no Fractures.      CKD5, uremia, requiring initiation of HD  Rash, seborrheic dermatitis  Pruritis  Anemia  PAD  SP catheter, chronic  Left inferior pole acute on chronic patella Fx    plan  - HD via R subclavian permacath  - 3/26: Wound care performed bedside debridement of Left knee wound 2/2 lifted eschar, and sacral decubitus. findings c/w possible infection and mainly fat necrosis. cont Meropenem. ID following. podiatry following for gangrenous toes. will give additional single dose IV DIlaudid 2/2 severe pain post debridement.    - 3/25: wife at bedside, daughter on speaker phone at bedside. ptn is pain free at the time of visit and states he wants to cont the pain regiment he is presently on. ct C/A/P revealed: RLL PNA, prob aspiration, stercoral proctitis, decubitus ulcer w possible progression to sacral OM, R IJ nonocclusive DVT. these findings d/w ptn ,his family, wound care, ACP, Vascular, Nephrology, ID, pUlm, GI. meropenem initiated, Bactrim stopped. ptn states when he is cleared for DC, he wants to go home , not to [facility], not to SNF. daughter and wife aware.   -3/24: ptn  was seen by GI for N/V, its not clear the etiology, will obtain Ct C/A/P. ptn states he is coughing up green mucus as well  -3/23:  ptn is in good spirits, says he vomited "spit" this am, but tolerated all meals without vomiting. no abd pain, no undigested food in the vomit. afebrile, no diarrhea, RVP neg. GI consulted.   -3/21-22: ptn feels well.  pain is controlled. still no accepting facility for [facility]  -3/20: ptn states he has severe low back and LLE pain though overall is improving.   -3/19: ptn w tan crusting around SPC , states its painful. started on po Bactrim, renally dosed by ID for cellulitis.   -3/18: LLE paraesthesias are chronic, will d/w CM re dc planning to [facility]    -3/17: ptn states he is lethargic, he has LLE numbness which is new and severe pain at SPC insertion site. this was ID, d/w neuro, renal and vascular. as per ID: no sign of an acute infection recommend CT A/P.   -3/16:  urine cx from 3/15 NTD, zosyn DCed, awaiting dc to [facility], not sure will be able to go to [facility] since there is an insurance coverage conflict. HD as per renal  - 3/15: spoke w ptn's daughter today re denial from [facility] for [facility]. [facility] has [employer] [facility] advantage plan, which [facility] doesnt accept. i recommended to speak  to Cm and to the insurance company to find participating facilities. ptn is stable today. pain and erythema at SP catheter site has resolved today. seen by ID, will cont ZOSYN for now. UCx sent.   - 3/14: suprapubic tube changhed by BET[facility], ptn has crusting at the insertion site and pain. will start Abx, urine always has sediment, will sent for cx, start Vanco/ZOsyn. ID consult called. check MRSA/MSSA PCR  -3/13: ptn is doing well. ptn has an accepting rehab facility, now pending AUTH.   -3/12: ptn is no longer constipated. doing well off prn IV DIlaudid. awaiting dc to [facility]  -3/11: ptn has no new c/o other than feeling constipated, lactulose ordered. also in preparation for [facility] will dc prn IV DIlaudid, cont prn OXy IR  -3/10:  ptn is awake, alert, wife at bedside, i instructed them to give rehab choices. also awaiting SPC change to be done by urology. pain is controlled  -3/9: seen by PT, will refer to [facility]. choices to be given in AM. this was d/w ptn, daughter, wife.   -3/8:  ptn didnt want to get PT eval done, will reorder. ptn needs [facility] placement. PMNR consult ordered  - 3/7: ptn is alert , pain is controlled, DC planning d/w ptn and HCP, daughter Yanique  -3/6:  ptn is awake, alert, ecchymotic feet look improved, pain is controlled. DC planning to [facility]  3/4-5 - s/p b/l iliac arteries angioplasty and stent placements on 3/3. today has new ecchymoses in b/l LE, concern for arterial insufficiency. vascular aware.. wound from Left knee immobilizer is dressed and healing. pain is controlled.   LEFT UE AVF placement/scheduling will be on outptn basis as per vascular. dc planning to [facility]  - 3/3: ptn is s/p angiogram RLE : b/l iliac arteries w successful angioplasty and stents. in PACU. awaitng HD.  ptn has a pressure wound from the LLE immobilizer posteriorly proximal to the knee. its been on 2/2 knee fracture, applied by ortho and managed by ptn's wife as per ptn's and wife's request , this was d/w nursing and nurse manager ms Ruiz.. immobilizer should be managed by orthopedics and nursing. will keep it off, only keep on when repositioning for care and then remove. wound care to F/U . this was discussed at length w daughter Yanique and wife Dee Dee.   -3/1-3/2: ptn is smiling, pain free, had HD 2/28 and 3/1, awaiting RLE angiogram 3/3, on Heparin drip, euvolemic  -2/28: ptn is lethargic, awaiting RLE angiogram possible fem-fem bypass hopefully on 3/3 pending OR availability, awaiting HD today    -2/27:  plan for angiogram in am with possible angioplasty, possible stent, possible fem-fem bypass. medically cleared for angiogram and possible surgery, stent, angioplasty. pain is controlled. remains on heparin drip as per vascular. HD as per renal    -2/26:  ptn is sleeping, pain is controlled, he was seen by wound care and vascular attending. planning on angiogram 2/2 severe PAD in LE on CTA. he also spoke to HCP. ptn and HCP in agreement. ptn was started on HEPARIN drip as per vascular recs. RIJ permacath done 2/25    - 2/25: ptn states he is hallucinating, but not at present, his MS is at baseline, his pain is controlled, he still needs prn pain meds when he is moved in the bed or for testing or for HD. awaiting Permacath, AVF creation, LE bypass to be d/w Dr. Swenson and the ptn tomorrow. ptn has cardiology clearance  if he opts for. Ptn has sacral decubiti and posterior thigh and buttock decibiti and b/l heel decubiti. Z flow fabienne d/w RN, get wound care consult    -2/24: pain is controlled  if the LLE is immobile and fully extended. doesn't tolerate the knee immobilizer. has a medium condyle and acute on chronic patella fx in LEFT knee. as per vascular ptn will need iliac stent  w a fem-fem bypass, possible axillo-femoral bypass. for this surgery he would need cardiac work up . more pressing surgery is LUE AVF creation,  in IR will arrange for Permacath. for pain control: Motrin 400 mg q12H, Oxy ER 30 mg q12H, Oxy IR 10 for mod pain and dilaudid 2 mg iv for severe pain. still has pruritis though improved, will raise atarax 25 mg to tid. plan of care d/w daughter Norma Persaud , ptn and his wife. Also d/w renal, card, vascular, ACP    -2/23: Daughter Yanique is the HCP, she and the ptn filled out the paperwork. daily plan and findings d/w her and the ptn. MS is at abseline, c/o b/l LE foot pain and Left Knee pain. xrays of left knee: cannot r/o acute on chronic inferior pole patellar Fx. knee immobilizer is on, awaiting ortho consult. doubt needs any intervention awaiting Ct chest today, will add CT Left knee. ptn states itching has recurred, severe pain has recurred. will resume Atatrax ( 25 mg bid) and Oxycodone ER , but at a lower dose 15 mg q12H. cont prn analgesics. HD as per renal, awaiting Vascular consult f/u re CTA A/L &LE and recs. ptn also wants AVF surgery on this admission. Coughing has stopped    - 2/22: ptn is drowsy but arousable, calmer today, answers questions appropriately. he is pain free, he stopped coughing, he denies having pruritis: will DC:  OXY ER, Atarax, Tessalon perles.     -  2/21: ptn is tearful, a bit confused, coughing, recognizes me and family at bedside. GOC d/w daughter and wife. AMS prob delirium 2/2 acute illness +/- opiods, lyrica, atarac. will lower atarak to tid, dc lyrica, cont Oxy 2/2 ptn has severe LE pain. neuro called for eval. Head ct done yesterday w no acute findings. CTA A/P w LE run fof: severe PAD, vascular to follow up on plan fo care. plan for HD tomorrow and next week place on tiw schedule of MWF. will need tunneled catheter prior to DC and will d/w vascular scheduling of AVF. ptn wants all to be done while inptn. daughter wants to be HCP as per ptn's wishes. she was given paperwork to get it signed and witnessed and will present to nursing thereafter. details of findings and complaints and plan of care d/w daughter and wife. spent 60 min. RVP ordered. start mucinex , tessalon perles, duonebs, get CT chest to r/o PNA. pulm called    -  2/20:  ptn had RRT 2/2 AMS and tremors. no SZ, no LOC. noted to have Na 123( hyponatremia), given 500 cc NS. Gabapentin DCed. ptn had been taking gabapentin at home PTA. Pain is controlled. Pruritis resolved, tolerating HD otherwise.     - Pain management:  cont Oxycodone 10 IR q6H,  DIlaudid prn severe pain 2 mg q4H prn.   - cont outptn meds  - DVT ppx w HSC

## 2025-03-26 NOTE — CONSULT NOTE ADULT - ASSESSMENT
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 Patient underwent RIJ permacath 2/25 with IR. Also noted to have femoral condyle fracture, managed with left knee immobilizer. Had a RLE angiogram with b/l iliac artery stents 3/3. Pt started on meropenem. CTc/a/p performed for infectious workup revealed incidental filling defect in RIJ vein, residual fibrin sheath vs nonocclusive thrombus.    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 Patient underwent RIJ permacath 2/25 with IR. Also noted to have femoral condyle fracture, managed with left knee immobilizer. Had a RLE angiogram with b/l iliac artery stents 3/3. Pt started on meropenem. CTc/a/p performed for infectious workup revealed incidental filling defect in RIJ vein, residual fibrin sheath vs nonocclusive thrombus.    Recs:  -CT chest reviewed  -obtain RUE venous duplex as outpatient   -remainder of care per primary team

## 2025-03-26 NOTE — PROGRESS NOTE ADULT - SUBJECTIVE AND OBJECTIVE BOX
Patient is a 74y old  Male who presents with a chief complaint of ESRD requiring HD, uremia (26 Mar 2025 14:17)      SUBJECTIVE / OVERNIGHT EVENTS: Wound care performed bedside debridement of Left knee wound 2/2 lifted eschar, and sacral decubitus. findings c/w possible infection and mainly fat necrosis. cont Meropenem. ID following. podiatry following for gangrenous toes. will give additional single dose IV DIlaudid 2/2 severe pain post debridement.     MEDICATIONS  (STANDING):  albuterol/ipratropium for Nebulization 3 milliLiter(s) Nebulizer every 6 hours  aspirin  chewable 81 milliGRAM(s) Oral daily  atorvastatin 40 milliGRAM(s) Oral at bedtime  chlorhexidine 2% Cloths 1 Application(s) Topical daily  clopidogrel Tablet 75 milliGRAM(s) Oral daily  epoetin aleah-epbx (RETACRIT) Injectable 75949 Unit(s) IV Push <User Schedule>  hydrOXYzine hydrochloride 25 milliGRAM(s) Oral three times a day  ibuprofen  Tablet. 400 milliGRAM(s) Oral every 12 hours  lactulose Syrup 20 Gram(s) Oral once  meropenem  IVPB 500 milliGRAM(s) IV Intermittent every 12 hours  mineral oil enema 133 milliLiter(s) Rectal once  mupirocin 2% Ointment 1 Application(s) Topical <User Schedule>  oxyCODONE  ER Tablet 30 milliGRAM(s) Oral every 12 hours  pantoprazole    Tablet 40 milliGRAM(s) Oral before breakfast  polyethylene glycol 3350 17 Gram(s) Oral daily  povidone iodine 10% Solution 1 Application(s) Topical two times a day  senna 2 Tablet(s) Oral at bedtime  simethicone 80 milliGRAM(s) Chew every 6 hours    MEDICATIONS  (PRN):  acetaminophen     Tablet .. 650 milliGRAM(s) Oral every 6 hours PRN Temp greater or equal to 38C (100.4F), Mild Pain (1 - 3)  benzocaine/menthol Lozenge 1 Lozenge Oral three times a day PRN Sore Throat  bisacodyl 5 milliGRAM(s) Oral every 12 hours PRN Constipation  HYDROmorphone  Injectable 2 milliGRAM(s) IV Push every 4 hours PRN Severe Pain (7 - 10)  oxyCODONE    IR 10 milliGRAM(s) Oral every 4 hours PRN Moderate Pain (4 - 6)  sodium chloride 0.9% lock flush 10 milliLiter(s) IV Push every 1 hour PRN Pre/post blood products, medications, blood draw, and to maintain line patency      Vital Signs Last 24 Hrs  T(F): 97.2 (03-26-25 @ 10:30), Max: 98.4 (03-25-25 @ 23:41)  HR: 86 (03-26-25 @ 10:30) (84 - 87)  BP: 113/69 (03-26-25 @ 10:30) (113/69 - 154/52)  RR: 17 (03-26-25 @ 10:30) (17 - 18)  SpO2: 98% (03-26-25 @ 10:30) (96% - 98%)  Telemetry:   CAPILLARY BLOOD GLUCOSE        I&O's Summary    25 Mar 2025 07:01  -  26 Mar 2025 07:00  --------------------------------------------------------  IN: 0 mL / OUT: 350 mL / NET: -350 mL    26 Mar 2025 07:01  -  26 Mar 2025 16:32  --------------------------------------------------------  IN: 0 mL / OUT: 200 mL / NET: -200 mL        PHYSICAL EXAM:  GENERAL: NAD, well-developed  HEAD:  Atraumatic, Normocephalic  EYES: EOMI, PERRLA, conjunctiva and sclera clear  NECK: Supple, No JVD  CHEST/LUNG: Clear to auscultation bilaterally; No wheeze  HEART: Regular rate and rhythm; No murmurs, rubs, or gallops  ABDOMEN: Soft, Nontender, Nondistended; Bowel sounds present  EXTREMITIES:  2+ Peripheral Pulses, No clubbing, cyanosis, or edema  PSYCH: AAOx3  NEUROLOGY: non-focal  SKIN: No rashes or lesions    LABS:                    RADIOLOGY & ADDITIONAL TESTS:    Imaging Personally Reviewed:    Consultant(s) Notes Reviewed:      Care Discussed with Consultants/Other Providers:

## 2025-03-26 NOTE — PROGRESS NOTE ADULT - SUBJECTIVE AND OBJECTIVE BOX
[facility]-- WOUND TEAM -- FOLLOW UP NOTE  --------------------------------------------------------------------------------    24 hour events/subjective:    alert  afebrile  incontinent  improving pain  tolerating po w/o n/v but poor appetite  wife at bedside- all questions asked to his expressed satisfaction      Diet:  Diet, Regular:   1000mL Fluid Restriction ([id number])  Low Sodium  No Carb Prosource TF     Qty per Day:  2  Supplement Feeding Modality:  Oral  Nepro Cans or Servings Per Day:  2       Frequency:  Daily (03-19-25 @ 09:42)      ROS: General/ SKIN/ MSK/ Neuro/ GI see HPI  all other systems negative      ALLERGIES & MEDICATIONS  --------------------------------------------------------------------------------    No Known Allergies      STANDING INPATIENT MEDICATIONS  albuterol/ipratropium for Nebulization 3 milliLiter(s) Nebulizer every 6 hours  aspirin  chewable 81 milliGRAM(s) Oral daily  atorvastatin 40 milliGRAM(s) Oral at bedtime  chlorhexidine 2% Cloths 1 Application(s) Topical daily  clopidogrel Tablet 75 milliGRAM(s) Oral daily  epoetin aleah-epbx (RETACRIT) Injectable 59003 Unit(s) IV Push <User Schedule>  hydrOXYzine hydrochloride 25 milliGRAM(s) Oral three times a day  ibuprofen  Tablet. 400 milliGRAM(s) Oral every 12 hours  lactulose Syrup 20 Gram(s) Oral once  meropenem  IVPB 500 milliGRAM(s) IV Intermittent every 12 hours  mineral oil enema 133 milliLiter(s) Rectal once  mupirocin 2% Ointment 1 Application(s) Topical <User Schedule>  oxyCODONE  ER Tablet 30 milliGRAM(s) Oral every 12 hours  pantoprazole    Tablet 40 milliGRAM(s) Oral before breakfast  polyethylene glycol 3350 17 Gram(s) Oral daily  povidone iodine 10% Solution 1 Application(s) Topical two times a day  senna 2 Tablet(s) Oral at bedtime  simethicone 80 milliGRAM(s) Chew every 6 hours      PRN INPATIENT MEDICATION  acetaminophen  Tablet 650 milliGRAM(s) Oral every 6 hours PRN  benzocaine/menthol Lozenge 1 Lozenge Oral three times a day PRN  bisacodyl 5 milliGRAM(s) Oral every 12 hours PRN  HYDROmorphone  Injectable 2 milliGRAM(s) IV Push every 4 hours PRN  HYDROmorphone  Injectable 2 milliGRAM(s) IV Push once PRN  oxyCODONE IR 10 milliGRAM(s) Oral every 4 hours PRN  sodium chloride 0.9% lock flush 10 milliLiter(s) IV Push every 1 hour PRN        VITALS/PHYSICAL EXAM  --------------------------------------------------------------------------------  T(C): 36.7 (03-26-25 @ 18:05), Max: 36.9 (03-25-25 @ 23:41)  HR: 95 (03-26-25 @ 18:05) (84 - 95)  BP: 97/52 (03-26-25 @ 18:05) (97/52 - 154/52)  RR: 18 (03-26-25 @ 18:05) (17 - 18)  SpO2: 91% (03-26-25 @ 18:05) (91% - 98%)  Wt(kg): --    NAD,  A&Ox3, Obese, WD/ WN/ W  [facility] P500 bed  HEENT:  NC/AT, EOMI, sclera clear, mucosa moist, throat clear, trachea midline, neck supple  Respiratory: nonlabored w/ equal chest rise  Gastrointestinal: soft NT/ND (+)colostomy pink / viable   : (+) superpubic cath     Scrotum w/fading hyperpigmented skin     no blistering or drainage  No odor, erythema, increased warmth, tenderness, induration, fluctuance, nor crepitus  Neurology:  weakened strength & sensation grossly intact  Psych: calm/ appropriate  Musculoskeletal: FROM, no deformities/ contractures  Vascular: BLE equally warm/cool,  no clubbing      (+)BLE edema equal       no BLE DP/PT pulses palpable      BLE demarcation noted across toes and heels w/purple skin changes /black eschar      no blistering or drainage  No odor, erythema, increased warmth, tenderness, induration, fluctuance, nor crepitus   Skin: thin, dry, pale, frail,  ecchymosis w/o hematoma  Sacrum into Lt buttocks into perineum evolving Unstageable pressure injury     black dry eschar lifting and  from wound edges w/ fading purple and pink denuded skin changes peripherally     10cm x 13cm x 0.4cm  Procedure Note  Using aseptic technique sacral wound was excisionally debrided using a scissor and forceps through necrotic dermis into nonviable subcutaneous tissue.  Pt tolerated procedure well.  Hemostasis was maintained throughout. Debulking debridement of necrotic tissue.  Post measurements 10cm x 13cm x 1cm w/ liquefaction necrosis & odor.    Rt Ischium stage 3 pressure injury     moist pink granular tissue     1.5cm x 3.5cm x0.1cm  Lt Buttocks fading DTI w/ soft grey echar denuded skin changes     2cm x 2cm      no blistering or drainage  No odor, erythema, increased warmth, tenderness, induration, fluctuance, nor crepitus  Lt upper thigh wrapping posteriorly w/ 2 wounds    Upper posterior thigh 4cm x 8cmx  0.1cm  grey and white slough and partial thickness skin changes    inferiorly starting anterior medially and wrapping around posterior        2cm x 15cm fading purple / maroon skin changes w/ blistering  Lt Knee  (6cm x 7cm) & Lateral upper calf (5cm x 4cm) dry black eschar  from wound edges w/o drainage   Lt calf to ankle 10cm x 3cm x 0.2cm lifting soft grey eschar      w/liquefaction necrotic drainage  Procedure Note  Using aseptic technique Lt calf wound was excisionally debrided using a scissor and forceps through necrotic/ nonviable dermis into subcutaneous tissue.  Pt tolerated procedure well.  Hemostasis was maintained throughout. Debulking debridement of necrotic tissue.  Post measurements  10cm x 3cm x 0.4cm  No odor, erythema, increased warmth, tenderness, induration, fluctuance, nor crepitus        LABS:  A1C with Estimated Average Glucose Result: 5.7 % (12-21-24 @ 06:44)  A1C with Estimated Average Glucose Result: 4.9 % (10-09-24 @ 13:30)

## 2025-03-26 NOTE — PROGRESS NOTE ADULT - SUBJECTIVE AND OBJECTIVE BOX
Overnight events noted      VITAL:  T(C): , Max: 36.9 (03-25-25 @ 23:41)  T(F): , Max: 98.4 (03-25-25 @ 23:41)  HR: 84 (03-25-25 @ 23:41)  BP: 131/70 (03-25-25 @ 23:41)  BP(mean): --  RR: 18 (03-25-25 @ 23:41)  SpO2: 96% (03-25-25 @ 23:41)  Wt(kg): --      PHYSICAL EXAM:  Constitutional: alert, NAD  HEENT: NCAT, DMM  Neck: Supple, No JVD  Respiratory: CTA-b/l  Cardiovascular: RRR s1s2, no m/r/g  Gastrointestinal: BS+, soft, NT/ND  : (+)suprapubic cath  Extremities: 1+ b/l LE edema  Neurological: reduced generalized strength  Back: no CVAT b/l  Skin: (+)cyanotic changes b/l feet  Access: RIJ tunneled cath    LABS:  Na(129)/K(4.6)/Cl(93)/HCO3(21)/BUN(34)/Cr(4.04)Glu(100)/Ca(7.5)/Mg(--)/PO4(--)    03-24 @ 07:13        IMPRESSION: 74M w/ polio, neurogenic bladder/suprapubic catheter, 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 Due for next HD today    (2)Hyponatremia - on high-Na+ bath with HD    (3)Anemia - s/p IV iron; on Retacrit with HD    (4)PAD - s/p LE bypass 3/3/25     (5)CV - multifactorial LE edema; improving with increasing intradialytic UF, and with improving nutritional parameters    (6)Neuro - reduced generalized strength - getting PT/awaiting NYDIA        RECOMMEND:   (1)Next HD today - 1.5L UF as able              Rafita Denny MD  [facility]  Office/on call physician: [phone]  Cell (7a-7p): [phone]       [employer]  (+)intermittent pain from suprapubic cath      VITAL:  T(C): , Max: 36.9 (03-25-25 @ 23:41)  T(F): , Max: 98.4 (03-25-25 @ 23:41)  HR: 84 (03-25-25 @ 23:41)  BP: 131/70 (03-25-25 @ 23:41)  BP(mean): --  RR: 18 (03-25-25 @ 23:41)  SpO2: 96% (03-25-25 @ 23:41)  Wt(kg): --      PHYSICAL EXAM:  Constitutional: alert, NAD  HEENT: NCAT, DMM  Neck: Supple, No JVD  Respiratory: CTA-b/l  Cardiovascular: RRR s1s2, no m/r/g  Gastrointestinal: BS+, soft, NT/ND  : (+)suprapubic cath  Extremities: 1+ b/l LE edema  Neurological: reduced generalized strength  Back: no CVAT b/l  Skin: (+)cyanotic changes b/l feet  Access: RIJ tunneled cath    LABS:  Na(129)/K(4.6)/Cl(93)/HCO3(21)/BUN(34)/Cr(4.04)Glu(100)/Ca(7.5)/Mg(--)/PO4(--)    03-24 @ 07:13        IMPRESSION: 74M w/ polio, neurogenic bladder/suprapubic catheter, 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 Due for next HD today    (2)Hyponatremia - on high-Na+ bath with HD    (3)Anemia - s/p IV iron; on Retacrit with HD    (4)PAD - s/p LE bypass 3/3/25     (5)CV - multifactorial LE edema; improving with increasing intradialytic UF, and with improving nutritional parameters    (6)Neuro - reduced generalized strength - getting PT/awaiting NYDIA        RECOMMEND:   (1)Next HD today - 1.5L UF as able              Rafita Denny MD  [facility]  Office/on call physician: [phone]  Cell (7a-7p): [phone]

## 2025-03-26 NOTE — PROGRESS NOTE ADULT - PROBLEM SELECTOR PLAN 2
Pt states having left upper leg pain radiates to left groin and down leg to calf  Denies injury, states went for 3 mile walk yest, also feels like asthma is worse today  Needing to take freq albuterol   Nondisplaced L medial femoral condyle fx   s/p RLE angiogram w/ b/l iliac artery stents 3/3  ortho following  Wound from Left knee immobilizer is dressed and healing.  New ecchymoses in b/l feet  As per vascular: no further revascularization options.  podiatry consulted:  No acute podiatric surgical intervention at this time.

## 2025-03-26 NOTE — PROGRESS NOTE ADULT - SUBJECTIVE AND OBJECTIVE BOX
Subjective: Patient seen and examined. No new events except as noted.     REVIEW OF SYSTEMS:    CONSTITUTIONAL: +weakness, fevers or chills  EYES/ENT: No visual changes;  No vertigo or throat pain   NECK: No pain or stiffness  RESPIRATORY: No cough, wheezing, hemoptysis; No shortness of breath  CARDIOVASCULAR: No chest pain or palpitations  GASTROINTESTINAL: No abdominal or epigastric pain. No nausea, vomiting, or hematemesis; No diarrhea or constipation. No melena or hematochezia.  GENITOURINARY: No dysuria, frequency or hematuria  NEUROLOGICAL: No numbness or weakness  SKIN: No itching, burning, rashes, or lesions   All other review of systems is negative unless indicated above.    MEDICATIONS:  MEDICATIONS  (STANDING):  albuterol/ipratropium for Nebulization 3 milliLiter(s) Nebulizer every 6 hours  aspirin  chewable 81 milliGRAM(s) Oral daily  atorvastatin 40 milliGRAM(s) Oral at bedtime  chlorhexidine 2% Cloths 1 Application(s) Topical daily  clopidogrel Tablet 75 milliGRAM(s) Oral daily  epoetin aleah-epbx (RETACRIT) Injectable 05386 Unit(s) IV Push <User Schedule>  hydrOXYzine hydrochloride 25 milliGRAM(s) Oral three times a day  ibuprofen  Tablet. 400 milliGRAM(s) Oral every 12 hours  lactulose Syrup 20 Gram(s) Oral once  meropenem  IVPB 500 milliGRAM(s) IV Intermittent every 12 hours  mupirocin 2% Ointment 1 Application(s) Topical <User Schedule>  oxyCODONE  ER Tablet 30 milliGRAM(s) Oral every 12 hours  pantoprazole    Tablet 40 milliGRAM(s) Oral before breakfast  polyethylene glycol 3350 17 Gram(s) Oral daily  povidone iodine 10% Solution 1 Application(s) Topical two times a day  senna 2 Tablet(s) Oral at bedtime  simethicone 80 milliGRAM(s) Chew every 6 hours      PHYSICAL EXAM:  T(C): 36.6 (03-26-25 @ 08:54), Max: 36.9 (03-25-25 @ 23:41)  HR: 87 (03-26-25 @ 08:54) (84 - 91)  BP: 154/52 (03-26-25 @ 08:54) (120/72 - 154/52)  RR: 18 (03-26-25 @ 08:54) (18 - 18)  SpO2: 97% (03-26-25 @ 08:54) (96% - 98%)  Wt(kg): --  I&O's Summary    25 Mar 2025 07:01  -  26 Mar 2025 07:00  --------------------------------------------------------  IN: 0 mL / OUT: 350 mL / NET: -350 mL    26 Mar 2025 07:01  -  26 Mar 2025 10:02  --------------------------------------------------------  IN: 0 mL / OUT: 200 mL / NET: -200 mL          Appearance: NAD  HEENT:  Dry oral mucosa, PERRL, EOMI	  Lymphatic: No lymphadenopathy  Cardiovascular: Normal S1 S2, No JVD, No murmurs, No edema  Respiratory: Lungs clear to auscultation	  Psychiatry: A & O x 3, Mood & affect appropriate  Skin: No rashes, No ecchymoses, No cyanosis	  Neurologic: Non-focal  GI/Abd: Soft, obese, nontender. Dressing to [facility] C/D/I. Suprapubic catheter in place  Ext: Palp femoral pulses bilat. BLE atrophic, minimal dorsi/plantarflexion bilaterally. Sensation grossly intact. LLE edematous compared to R, erythema to right toes/forefoot. mottling of right toes/forefoot/heel  LLE:  small superficial abrasion, +edema and +ecchymosis over L knee  +TTP over L knee, no TTP along remainder of extremity; compartments soft  Limited ROM at knee 2/2 pain  No gross varus/valgus laxity, but assessment limited 2/2 pain  Motor: TA/EHL/GS/FHL intact  Sensory: DP/SP/Tib/Jordan/Saph SILT  +DP pulse (symmetric relative to contralateral side), WWP   pressure wound from the LLE immobilizer posteriorly proximal to the knee.  Vascular: Peripheral pulses palpable 2+ bilaterally      LABS:    CARDIAC MARKERS:                  proBNP:   Lipid Profile:   HgA1c:   TSH:             TELEMETRY: 	    ECG:  	  RADIOLOGY:   DIAGNOSTIC TESTING:  [ ] Echocardiogram:  [ ]  Catheterization:  [ ] Stress Test:    OTHER:

## 2025-03-26 NOTE — PROGRESS NOTE ADULT - ASSESSMENT
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 The initial plan was that he would present to the [facility] ER Monday 2/17 for HD initiation. However, day of admission, he fell and sustained trauma to his knee. Called to consult for cough, elevated R hemidiaphragm.

## 2025-03-26 NOTE — PROGRESS NOTE ADULT - ASSESSMENT
74 year old man with SONDRA on CKD requiring HD, now with nausea    1. ESRD on HD (new)     2. Nausea/dyspepsia  - improving  -favor r/t newly started HD  -PPI trial for likely GERD   -tolerating diet    3. Proctitis d/t constipation   -mineral oil enema x 1 ordered  -maintain on senna qhs with Miralax twice daily     4. Decubitus Ulcer   CT noting subq gas and infiltration tracking, ?necrotizing air formation - f/u primary team and ID                 I had a prolonged conversation with the patient regarding the hospital course, differential diagnosis, results of diagnostic tests this far, and therapeutic modalities available. Plan of care discussed with the patient after the evaluation. Patient expresses a clear understanding of the plan of care.  Sixty five minutes spent on the total encounter, of which more than fifty percent of the encounter was spent on counseling and/or coordinating care by the attending physician.  Advanced care planning forms were discussed. Code status including forceful chest compressions, defibrillation and intubation were discussed. The risks benefits and alternatives to pertinent gastrointestinal procedures and interventions were discussed in detail and all questions were answered. Duration: 15 Minutes.    [facility]  Clive Hutson M.D.   [address] [place] [address] [address] [place] [address]  Office: [phone]

## 2025-03-26 NOTE — PROGRESS NOTE ADULT - ASSESSMENT
74 year old male with CKD, sp polio, paraplegia with associated neurogenic bladder s/p suprapubic catheter who was admitted s/p fall on 2/16.   CKD - ESRD, now s/p DEYA [place] 2/17, on HD  ruled out cellulitis around suprapubic cath  2/20 s/p RRT for tremors and confusion -- pt with chronic tremors although notably worse  2/20 CXR with clear lungs   SPC site with chronic/stable inflammatory changes, no drainage - no sign of SSTI  CTA abd with occlusion of b/l external iliac arteries and b/l superficial femoral arteries with distal reconstitution at popliteal arteries; patent three vessel runoff of RLE with 2 vessel runoff of LLE with occlusion of L mid anterior tibial artery with distal reconstitution  Vascular following for worsening PAD with worsening ischemic changes   -3/3 s/p RLE angiogram b/l iliac artery stents   mental status at baseline    3/16 no tenderness at suprapubic catheter site, no visible erythema at catheter site on exam  UA w/ many epithelial cells, Ucx negative  s/p zosyn 3/14-3/16  3/19 SPC site remains without erythema but now noted with some tan crusting on dressing  3/23 reporting vomiting x5 episodes, no diarrhea or other focal sx  Treated for possible SPC site infection with bactrim 3/19-3/25  3/25 CT Chest noted RLL consolidative opacities, LLL opacities - pneumonia vs atelectasis, few ill defined nodular opacities in LLL infectious vs inflammatory  3/25 CTAP with subcutaneous gas and infiltration tracking along medial R buttock, possibly related to decubitus wound with gas tracking from the external environment, possible necrotizing air forming infection; rectal wall thickening suggestive of proctitis; b/l mild hydroureteronephrosis to level of bladder with no obstruction and diffuse urothelial thickening of b/l renal collecting systems and ureters similar to 2/20  MRSA screen has been negative   ptient with no resp symptoms- no cough, dyspnea or pain, saturating well on RA, low suspicion for acute pneumonia   remains afebrile, WBC overall downtrended, reports decreased pain overall and feeling much better     Recommendations:   Continue meropenem (renally dosed) for now based on prior cultures  Wound care/surgery evaluation   GI following   HD per renal    Monitor temps/WBC  Wound care following   Continue rest of care per primary team       Bridgette Ramirez M.D.  [facility]  Available on [employer] TEAMS - *PREFERRED*  [phone]  After 5pm on weekdays and all day on weekends - please call [phone]     Thank you for consulting us and involving us in the management of this patients case. In addition to reviewing history, imaging, documents, labs, microbiology, took into account antibiotic stewardship, local antibiogram and infection control strategies and potential transmission issues at time of treatment decision making process.

## 2025-03-26 NOTE — PROGRESS NOTE ADULT - SUBJECTIVE AND OBJECTIVE BOX
Date of Service: 03-26-25 @ 14:17    Patient is a 74y old  Male who presents with a chief complaint of ESRD requiring HD, uremia (26 Mar 2025 11:04)      Any change in ROS: sees OK?:  getting HD     MEDICATIONS  (STANDING):  albuterol/ipratropium for Nebulization 3 milliLiter(s) Nebulizer every 6 hours  aspirin  chewable 81 milliGRAM(s) Oral daily  atorvastatin 40 milliGRAM(s) Oral at bedtime  chlorhexidine 2% Cloths 1 Application(s) Topical daily  clopidogrel Tablet 75 milliGRAM(s) Oral daily  epoetin aleah-epbx (RETACRIT) Injectable 43120 Unit(s) IV Push <User Schedule>  hydrOXYzine hydrochloride 25 milliGRAM(s) Oral three times a day  ibuprofen  Tablet. 400 milliGRAM(s) Oral every 12 hours  lactulose Syrup 20 Gram(s) Oral once  meropenem  IVPB 500 milliGRAM(s) IV Intermittent every 12 hours  mineral oil enema 133 milliLiter(s) Rectal once  mupirocin 2% Ointment 1 Application(s) Topical <User Schedule>  oxyCODONE  ER Tablet 30 milliGRAM(s) Oral every 12 hours  pantoprazole    Tablet 40 milliGRAM(s) Oral before breakfast  polyethylene glycol 3350 17 Gram(s) Oral daily  povidone iodine 10% Solution 1 Application(s) Topical two times a day  senna 2 Tablet(s) Oral at bedtime  simethicone 80 milliGRAM(s) Chew every 6 hours    MEDICATIONS  (PRN):  acetaminophen     Tablet .. 650 milliGRAM(s) Oral every 6 hours PRN Temp greater or equal to 38C (100.4F), Mild Pain (1 - 3)  benzocaine/menthol Lozenge 1 Lozenge Oral three times a day PRN Sore Throat  bisacodyl 5 milliGRAM(s) Oral every 12 hours PRN Constipation  HYDROmorphone  Injectable 2 milliGRAM(s) IV Push every 4 hours PRN Severe Pain (7 - 10)  oxyCODONE    IR 10 milliGRAM(s) Oral every 4 hours PRN Moderate Pain (4 - 6)  sodium chloride 0.9% lock flush 10 milliLiter(s) IV Push every 1 hour PRN Pre/post blood products, medications, blood draw, and to maintain line patency    Vital Signs Last 24 Hrs  T(C): 36.2 (26 Mar 2025 10:30), Max: 36.9 (25 Mar 2025 23:41)  T(F): 97.2 (26 Mar 2025 10:30), Max: 98.4 (25 Mar 2025 23:41)  HR: 86 (26 Mar 2025 10:30) (84 - 91)  BP: 113/69 (26 Mar 2025 10:30) (113/69 - 154/52)  BP(mean): --  RR: 17 (26 Mar 2025 10:30) (17 - 18)  SpO2: 98% (26 Mar 2025 10:30) (96% - 98%)    Parameters below as of 26 Mar 2025 10:30  Patient On (Oxygen Delivery Method): room air        I&O's Summary    25 Mar 2025 07:01  -  26 Mar 2025 07:00  --------------------------------------------------------  IN: 0 mL / OUT: 350 mL / NET: -350 mL    26 Mar 2025 07:01  -  26 Mar 2025 14:17  --------------------------------------------------------  IN: 0 mL / OUT: 200 mL / NET: -200 mL          Physical Exam:   GENERAL: NAD, well-groomed, well-developed  HEENT: VINNY/   Atraumatic, Normocephalic  ENMT: No tonsillar erythema, exudates, or enlargement; Moist mucous membranes, Good dentition, No lesions  NECK: Supple, No JVD, Normal thyroid  CHEST/LUNG: Clear to auscultaion  CVS: Regular rate and rhythm; No murmurs, rubs, or gallops  GI: : Soft, Nontender, Nondistended; Bowel sounds present  NERVOUS SYSTEM:  Alert & Oriented X3  EXTREMITIES: gangrenous digits  LYMPH: No lymphadenopathy noted  SKIN: No rashes or lesions  ENDOCRINOLOGY: No Thyromegaly  PSYCH: calm     Labs:                              9.9    10.94 )-----------( 245      ( 24 Mar 2025 07:13 )             33.4     03-24    129[L]  |  93[L]  |  34[H]  ----------------------------<  100[H]  4.6   |  21[L]  |  4.04[H]        CAPILLARY BLOOD GLUCOSE          rad< from: CT Abdomen and Pelvis w/ IV Cont (03.25.25 @ 13:43) >  ABDOMEN AND PELVIS:  *  Subcutaneous gas and infiltration tracking along the medial right   buttock, potentially related to a decubitus wound with gas tracking from   the external environment. A necrotizing airforming infection is also a   possibility. Correlate with physical exam.  *  Rectal wall thickening, suggestive of proctitis.  *  Bilateral mild hydroureteronephrosis to the level of the urinary   bladder without evidence of obstructive urolithiasis, and not   significantly changed since 2/20/2025.  *  Diffuse urothelial thickening of the bilateral renal collecting   systems and ureters, which may be seen with an ascending urinary tract   infection or inflammation, similar to the previous CT 2/20/2025.    Findings were discussed with Dr. NELL TOLBERT 3/25/2025 4:30 PM by   Dr. Calzada.    --- End of Report ---    < end of copied text >  < from: CT Abdomen and Pelvis w/ IV Cont (03.25.25 @ 13:43) >  *  Right lower lobe consolidative opacities, which may represent   pneumonia. Additional consolidative opacities in the left lower lobe,   potentially atelectasis or infection. A few ill-defined nodular opacities   in the left lower lobe measuring up to 6 mm may also be   infectious/inflammatory in etiology. Suggest continued attention on   follow-up imaging.  *  Small amount of debris in the trachea.  *  Small left pleural effusion.  *  Linear nonocclusive filling defect within the right internal jugular   vein, which may represent residual fibrin sheath or nonocclusive thrombus.    < end of copied text >              RECENT CULTURES:        RESPIRATORY CULTURES:          Studies  Chest X-RAY  CT SCAN Chest   Venous Dopplers: LE:   CT Abdomen  Others

## 2025-03-26 NOTE — PROGRESS NOTE ADULT - SUBJECTIVE AND OBJECTIVE BOX
INTERVAL HPI/OVERNIGHT EVENTS:    offered no gi complaints this morning   +flatus, denied bm   ct reviewed     MEDICATIONS  (STANDING):  albuterol/ipratropium for Nebulization 3 milliLiter(s) Nebulizer every 6 hours  aspirin  chewable 81 milliGRAM(s) Oral daily  atorvastatin 40 milliGRAM(s) Oral at bedtime  chlorhexidine 2% Cloths 1 Application(s) Topical daily  clopidogrel Tablet 75 milliGRAM(s) Oral daily  epoetin aleah-epbx (RETACRIT) Injectable 48951 Unit(s) IV Push <User Schedule>  hydrOXYzine hydrochloride 25 milliGRAM(s) Oral three times a day  ibuprofen  Tablet. 400 milliGRAM(s) Oral every 12 hours  lactulose Syrup 20 Gram(s) Oral once  meropenem  IVPB 500 milliGRAM(s) IV Intermittent every 12 hours  mineral oil enema 133 milliLiter(s) Rectal once  mupirocin 2% Ointment 1 Application(s) Topical <User Schedule>  oxyCODONE  ER Tablet 30 milliGRAM(s) Oral every 12 hours  pantoprazole    Tablet 40 milliGRAM(s) Oral before breakfast  polyethylene glycol 3350 17 Gram(s) Oral daily  povidone iodine 10% Solution 1 Application(s) Topical two times a day  senna 2 Tablet(s) Oral at bedtime  simethicone 80 milliGRAM(s) Chew every 6 hours    MEDICATIONS  (PRN):  acetaminophen     Tablet .. 650 milliGRAM(s) Oral every 6 hours PRN Temp greater or equal to 38C (100.4F), Mild Pain (1 - 3)  benzocaine/menthol Lozenge 1 Lozenge Oral three times a day PRN Sore Throat  bisacodyl 5 milliGRAM(s) Oral every 12 hours PRN Constipation  HYDROmorphone  Injectable 2 milliGRAM(s) IV Push every 4 hours PRN Severe Pain (7 - 10)  oxyCODONE    IR 10 milliGRAM(s) Oral every 4 hours PRN Moderate Pain (4 - 6)  sodium chloride 0.9% lock flush 10 milliLiter(s) IV Push every 1 hour PRN Pre/post blood products, medications, blood draw, and to maintain line patency      Allergies    No Known Allergies    Intolerances        Review of Systems:    General:  No wt loss, fevers, chills, night sweats, fatigue   Eyes:  Good vision, no reported pain  ENT:  No sore throat, pain, runny nose, dysphagia  CV:  No pain, palpitations, hypo/hypertension  Resp:  No dyspnea, cough, tachypnea, wheezing  GI:  No pain, No nausea, No vomiting, No diarrhea, No constipation, No weight loss, No fever, No pruritis, No rectal bleeding, No melena, No dysphagia  :  No pain, bleeding, incontinence, nocturia  Muscle:  No pain, weakness  Neuro:  No weakness, tingling, memory problems  Psych:  No fatigue, insomnia, mood problems, depression  Endocrine:  No polyuria, polydypsia, cold/heat intolerance  Heme:  No petechiae, ecchymosis, easy bruisability  Skin:  No rash, tattoos, scars, edema      Vital Signs Last 24 Hrs  T(C): 36.2 (26 Mar 2025 10:30), Max: 36.9 (25 Mar 2025 23:41)  T(F): 97.2 (26 Mar 2025 10:30), Max: 98.4 (25 Mar 2025 23:41)  HR: 86 (26 Mar 2025 10:30) (84 - 91)  BP: 113/69 (26 Mar 2025 10:30) (113/69 - 154/52)  BP(mean): --  RR: 17 (26 Mar 2025 10:30) (17 - 18)  SpO2: 98% (26 Mar 2025 10:30) (96% - 98%)    Parameters below as of 26 Mar 2025 10:30  Patient On (Oxygen Delivery Method): room air        PHYSICAL EXAM:    Constitutional: NAD  HEENT: EOMI, throat clear  Neck: No LAD, supple  Respiratory: CTA and P  Cardiovascular: S1 and S2, RRR, no M  Gastrointestinal: BS+, soft, NT/ND, neg HSM,  Extremities: No peripheral edema, neg clubbing, cyanosis  Vascular: 2+ peripheral pulses  Neurological: A/O   Psychiatric: Normal mood, normal affect  Skin: No rashes      LABS:      RADIOLOGY & ADDITIONAL TESTS:    < from: CT Abdomen and Pelvis w/ IV Cont (03.25.25 @ 13:43) >    ACC: [id number] EXAM:  CT CHEST IC   ORDERED BY:  NELL TOLBERT     ACC: [id number] EXAM:  CT ABDOMEN AND PELVIS IC   ORDERED BY:  NELL TOLBERT     PROCEDURE DATE:  03/25/2025          INTERPRETATION:  CLINICAL INFORMATION: Evaluate for pneumonia. Abdominal   pain.    COMPARISON: CT chest 2/23/2025. CT abdomen and pelvis 2/20/2025.    CONTRAST/COMPLICATIONS:  IV Contrast: Omnipaque 350  90 cc administered   10 cc discarded  Oral Contrast: NONE    PROCEDURE:  CT of the Chest, Abdomen and Pelvis was performed.  Sagittal and coronal reformats were performed.    FINDINGS:  CHEST:  LUNGS AND LARGE AIRWAYS: Patent central airways. Small amount of debris   within the trachea. Right lower lobe consolidative opacities. Additional   consolidative opacities in the left lower lobe, possibly atelectasis. A   few ill-defined nodular opacities in the left lower lobe measuring up to   6 mm.  PLEURA: Small left pleural effusion.  VESSELS: Right IJ approach catheter with tip in the superior cavoatrial   junction. Linear nonocclusive filling defect within the right internal   jugular vein (3:1). Atherosclerotic changes. Coronary artery   calcification.  HEART: Heart size is normal. Aortic valve and mitral annular   calcification. No pericardialeffusion.  MEDIASTINUM AND MARYANNE: No lymphadenopathy.  CHEST WALL AND LOWER NECK: Within normal limits.    ABDOMEN AND PELVIS:  LIVER: Subcentimeter hypodensity in the left lobe that is too small to   characterize.  BILE DUCTS: Normal caliber.  GALLBLADDER: Cholelithiasis.  SPLEEN: Within normal limits.  PANCREAS: Within normal limits.  ADRENALS: Within normal limits.  KIDNEYS/URETERS: Bilateral mild hydroureteronephrosis to the level of the   urinary bladder without evidence of obstructive urolithiasis, not   significantly changed from the prior exam of 2/20/2025. Diffuse   urothelial thickening of the bilateral ureters and collecting systems,   also similar to the previous CT. An exophytic hypodense right renal   lesion measuring 4.1 cm is unchanged.    BLADDER: Underdistended with a suprapubic catheter in place.  REPRODUCTIVE ORGANS: Enlarged prostate. Linear densities in the prostate,   possibly from previous UroLift procedure; correlate with the patient's   surgical history.    BOWEL:Rectal wall thickening. Sigmoid anastomosis. No evidence for bowel   obstruction. Normal appendix.  PERITONEUM/RETROPERITONEUM: Within normal limits.  VESSELS: Atherosclerotic changes. Bilateral common iliac/external iliac   artery stents. Previouslydescribed bilateral external iliac and   bilateral superficial femoral artery occlusions seen on the prior CTA is   not well assessed by this exam.  LYMPH NODES: Small left para-aortic lymph node measuring 1.1 x 0.9 cm,   unchanged.  ABDOMINAL WALL: Suprapubic catheter. Subcutaneous gas and subcutaneous   infiltration tracking through the right medial buttock. No discrete   associated collection within the field-of-view..  BONES: Degenerative changes. Bilateral femoral fixation hardware.    IMPRESSION:    CHEST:  *  Right lower lobe consolidative opacities, which may represent   pneumonia. Additional consolidative opacities in the left lower lobe,   potentially atelectasis or infection. A few ill-defined nodular opacities   in the left lower lobe measuring up to 6 mm may also be   infectious/inflammatory in etiology. Suggest continued attention on   follow-up imaging.  *  Small amount of debris in the trachea.  *  Small left pleural effusion.  *  Linear nonocclusive filling defect within the right internal jugular   vein, which may represent residual fibrin sheath or nonocclusive thrombus.    ABDOMEN AND PELVIS:  *  Subcutaneous gas and infiltration tracking along the medial right   buttock, potentially related to a decubitus wound with gas tracking from   the external environment. A necrotizing airforming infection is also a   possibility. Correlate with physical exam.  *  Rectal wall thickening, suggestive of proctitis.  *  Bilateral mild hydroureteronephrosis to the level of the urinary   bladder without evidence of obstructive urolithiasis, and not   significantly changed since 2/20/2025.  *  Diffuse urothelial thickening of the bilateral renal collecting   systems and ureters, which may be seen with an ascending urinary tract   infection or inflammation, similar to the previous CT 2/20/2025.    Findings were discussed with Dr. NELL TOLBERT 3/25/2025 4:30 PM by   Dr. Pelayo.    --- End of Report ---          KELI PELAYO MD; Resident Radiologist  This document has been electronically signed.  MAE FLORES MD; Attending Radiologist  This document has been electronically signed. Mar 25 2025  4:35PM    < end of copied text >

## 2025-03-27 NOTE — PROGRESS NOTE ADULT - ASSESSMENT
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 He is well known to me from multiple recent admisions  s/p admissions at [facility] 12/20-12/25/24 and 2/4-2/7 with SONDRA on CKD with associated uremic symptoms.   At the last admission he had expressed strong interest to try to hold off with HD intiation but he has been getting worse clinically at home.  His SONDRA and uremic symptoms significantly improved after the first admission; they improved a to mild extent during the 2nd admission to the point where he was able to be discharged without HD. Since then, however, he has worsened further.   He has been suffering from progressively worsening diffuse pruritus, loss of appetite, and generalized weakness and falls.   His nephrologist and PCP has been speaking with him and his family over the past few days,   The initial plan was that he would present to the [facility] ER Monday 2/17 (ie tomorrow) for HD initiation. Today, however, he fell and sustained trauma to his knee. Given the fall and concern for knee fracture, he presented to the ER today. multiple xrays show no Fractures.      CKD5, uremia, requiring initiation of HD  Rash, seborrheic dermatitis  Pruritis  Anemia  PAD  SP catheter, chronic  Left inferior pole acute on chronic patella Fx    plan  - HD via R subclavian permacath  -3/27: ptn is complaining that pain post decubital and left knee debridement has been severe. his daughter is on speaker phone, wife is at bedside. his pain is controlled when meds are administered. he is very uncomfortable due to the pain at the site of decubitus debridement. lyrica helped his pain before but made him drowsy, griffin with gabapentin. he doesnt want them to be resumed. will cont dilaudid, oxycodone, oxycontin, ibuprofen. he is on Meropenem for aspiration PNA/HCAP. [facility] for DVT ppx. seen by vascular cardiology to address R IJ post shiley non occlusive DVT. full AC was not recommended. will check rpt doppler in 3-4 weeks.   - 3/26: Wound care performed bedside debridement of Left knee wound 2/2 lifted eschar, and sacral decubitus. findings c/w possible infection and mainly fat necrosis. cont Meropenem. ID following. podiatry following for gangrenous toes. will give additional single dose IV DIlaudid 2/2 severe pain post debridement.    - 3/25: wife at bedside, daughter on speaker phone at bedside. ptn is pain free at the time of visit and states he wants to cont the pain regiment he is presently on. ct C/A/P revealed: RLL PNA, prob aspiration, stercoral proctitis, decubitus ulcer w possible progression to sacral OM, R IJ nonocclusive DVT. these findings d/w ptn ,his family, wound care, ACP, Vascular, Nephrology, ID, pUlm, GI. meropenem initiated, Bactrim stopped. ptn states when he is cleared for DC, he wants to go home , not to [facility], not to SNF. daughter and wife aware.   -3/24: ptn  was seen by GI for N/V, its not clear the etiology, will obtain Ct C/A/P. ptn states he is coughing up green mucus as well  -3/23:  ptn is in good spirits, says he vomited "spit" this am, but tolerated all meals without vomiting. no abd pain, no undigested food in the vomit. afebrile, no diarrhea, RVP neg. GI consulted.   -3/21-22: ptn feels well.  pain is controlled. still no accepting facility for [facility]  -3/20: ptn states he has severe low back and LLE pain though overall is improving.   -3/19: ptn w tan crusting around SPC , states its painful. started on po Bactrim, renally dosed by ID for cellulitis.   -3/18: LLE paraesthesias are chronic, will d/w CM re dc planning to [facility]    -3/17: ptn states he is lethargic, he has LLE numbness which is new and severe pain at SPC insertion site. this was ID, d/w neuro, renal and vascular. as per ID: no sign of an acute infection recommend CT A/P.   -3/16:  urine cx from 3/15 NTD, zosyn DCed, awaiting dc to [facility], not sure will be able to go to [facility] since there is an insurance coverage conflict. HD as per renal  - 3/15: spoke w ptkathrine's daughter today re denial from [employer] for [facility]. ptn has [employer] medicare advantage plan, which [facility] doesnt accept. i recommended to speak  to Cm and to the insurance company to find participating facilities. ptn is stable today. pain and erythema at SP catheter site has resolved today. seen by ID, will cont ZOSYN for now. UCx sent.   - 3/14: suprapubic tube changhed by , ptn has crusting at the insertion site and pain. will start Abx, urine always has sediment, will sent for cx, start Vanco/ZOsyn. ID consult called. check MRSA/MSSA PCR  -3/13: ptn is doing well. ptn has an accepting rehab facility, now pending AUTH.   -3/12: ptn is no longer constipated. doing well off prn IV DIlaudid. awaiting dc to [facility]  -3/11: ptn has no new c/o other than feeling constipated, lactulose ordered. also in preparation for [facility] will dc prn IV DIlaudid, cont prn OXy IR  -3/10:  ptn is awake, alert, wife at bedside, i instructed them to give rehab choices. also awaiting SPC change to be done by urology. pain is controlled  -3/9: seen by PT, will refer to [facility]. choices to be given in AM. this was d/w ptn, daughter, wife.   -3/8:  ptn didnt want to get PT eval done, will reorder. ptn needs [facility] placement. PMNR consult ordered  - 3/7: ptn is alert , pain is controlled, DC planning d/w ptn and HCP, daughter Ynaique  -3/6:  ptn is awake, alert, ecchymotic feet look improved, pain is controlled. DC planning to [facility]  3/4-5 - s/p b/l iliac arteries angioplasty and stent placements on 3/3. today has new ecchymoses in b/l LE, concern for arterial insufficiency. vascular aware.. wound from Left knee immobilizer is dressed and healing. pain is controlled.   LEFT UE AVF placement/scheduling will be on outptn basis as per vascular. dc planning to [facility]  - 3/3: ptn is s/p angiogram RLE : b/l iliac arteries w successful angioplasty and stents. in PACU. awaitng HD.  ptn has a pressure wound from the LLE immobilizer posteriorly proximal to the knee. its been on 2/2 knee fracture, applied by ortho and managed by ptn's wife as per ptn's and wife's request , this was d/w nursing and nurse manager ms Ruiz.. immobilizer should be managed by orthopedics and nursing. will keep it off, only keep on when repositioning for care and then remove. wound care to F/U . this was discussed at length w daughter Yanique and wife Dee Dee.   -3/1-3/2: ptn is smiling, pain free, had HD 2/28 and 3/1, awaiting RLE angiogram 3/3, on Heparin drip, euvolemic  -2/28: ptn is lethargic, awaiting RLE angiogram possible fem-fem bypass hopefully on 3/3 pending OR availability, awaiting HD today    -2/27:  plan for angiogram in am with possible angioplasty, possible stent, possible fem-fem bypass. medically cleared for angiogram and possible surgery, stent, angioplasty. pain is controlled. remains on heparin drip as per vascular. HD as per renal    -2/26:  ptn is sleeping, pain is controlled, he was seen by wound care and vascular attending. planning on angiogram 2/2 severe PAD in LE on CTA. he also spoke to HCP. ptn and HCP in agreement. ptn was started on HEPARIN drip as per vascular recs. RIJ permacath done 2/25    - 2/25: ptn states he is hallucinating, but not at present, his MS is at baseline, his pain is controlled, he still needs prn pain meds when he is moved in the bed or for testing or for HD. awaiting Permacath, AVF creation, LE bypass to be d/w Dr. Swenson and the ptn tomorrow. ptn has cardiology clearance  if he opts for. Ptn has sacral decubiti and posterior thigh and buttock decibiti and b/l heel decubiti. Z flow bootie d/w RN, get wound care consult    -2/24: pain is controlled  if the LLE is immobile and fully extended. doesn't tolerate the knee immobilizer. has a medium condyle and acute on chronic patella fx in LEFT knee. as per vascular ptn will need iliac stent  w a fem-fem bypass, possible axillo-femoral bypass. for this surgery he would need cardiac work up . more pressing surgery is LUE AVF creation,  in IR will arrange for Permacath. for pain control: Motrin 400 mg q12H, Oxy ER 30 mg q12H, Oxy IR 10 for mod pain and dilaudid 2 mg iv for severe pain. still has pruritis though improved, will raise atarax 25 mg to tid. plan of care d/w daughter Norma Persaud , ptn and his wife. Also d/w renal, card, vascular, ACP    -2/23: Daughter Yanique is the HCP, she and the ptn filled out the paperwork. daily plan and findings d/w her and the ptn. MS is at abseline, c/o b/l LE foot pain and Left Knee pain. xrays of left knee: cannot r/o acute on chronic inferior pole patellar Fx. knee immobilizer is on, awaiting ortho consult. doubt needs any intervention awaiting Ct chest today, will add CT Left knee. ptn states itching has recurred, severe pain has recurred. will resume Atatrax ( 25 mg bid) and Oxycodone ER , but at a lower dose 15 mg q12H. cont prn analgesics. HD as per renal, awaiting Vascular consult f/u re CTA A/L &LE and recs. ptn also wants AVF surgery on this admission. Coughing has stopped    - 2/22: ptn is drowsy but arousable, calmer today, answers questions appropriately. he is pain free, he stopped coughing, he denies having pruritis: will DC:  OXY ER, Atarax, Tessalon perles.     -  2/21: ptn is tearful, a bit confused, coughing, recognizes me and family at bedside. GOC d/w daughter and wife. AMS prob delirium 2/2 acute illness +/- opiods, lyrica, atarac. will lower atarak to tid, dc lyrica, cont Oxy 2/2 ptn has severe LE pain. neuro called for eval. Head ct done yesterday w no acute findings. CTA A/P w LE run fof: severe PAD, vascular to follow up on plan fo care. plan for HD tomorrow and next week place on tiw schedule of MWF. will need tunneled catheter prior to DC and will d/w vascular scheduling of AVF. ptn wants all to be done while inptn. daughter wants to be HCP as per ptn's wishes. she was given paperwork to get it signed and witnessed and will present to nursing thereafter. details of findings and complaints and plan of care d/w daughter and wife. spent 60 min. RVP ordered. start mucinex , tessalon perles, duonebs, get CT chest to r/o PNA. pulm called    -  2/20:  ptn had RRT 2/2 AMS and tremors. no SZ, no LOC. noted to have Na 123( hyponatremia), given 500 cc NS. Gabapentin DCed. ptn had been taking gabapentin at home PTA. Pain is controlled. Pruritis resolved, tolerating HD otherwise.     - Pain management:  cont Oxycodone 10 IR q6H,  DIlaudid prn severe pain 2 mg q4H prn.   - cont outptn meds  - DVT ppx w [facility]SC

## 2025-03-27 NOTE — PROGRESS NOTE ADULT - SUBJECTIVE AND OBJECTIVE BOX
INTERVAL HPI/OVERNIGHT EVENTS:    reports no abdominal discomfort this morning; no n/v  (+) productive bm's     MEDICATIONS  (STANDING):  albuterol/ipratropium for Nebulization 3 milliLiter(s) Nebulizer every 6 hours  aspirin  chewable 81 milliGRAM(s) Oral daily  atorvastatin 40 milliGRAM(s) Oral at bedtime  chlorhexidine 2% Cloths 1 Application(s) Topical daily  clopidogrel Tablet 75 milliGRAM(s) Oral daily  Dakins Solution - 1/4 Strength 1 Application(s) Topical every 8 hours  epoetin aleah-epbx (RETACRIT) Injectable 85051 Unit(s) IV Push <User Schedule>  hydrOXYzine hydrochloride 25 milliGRAM(s) Oral three times a day  ibuprofen  Tablet. 400 milliGRAM(s) Oral every 12 hours  lactulose Syrup 20 Gram(s) Oral once  meropenem  IVPB 500 milliGRAM(s) IV Intermittent every 12 hours  mineral oil enema 133 milliLiter(s) Rectal once  mupirocin 2% Ointment 1 Application(s) Topical <User Schedule>  oxyCODONE  ER Tablet 30 milliGRAM(s) Oral every 12 hours  pantoprazole    Tablet 40 milliGRAM(s) Oral before breakfast  polyethylene glycol 3350 17 Gram(s) Oral daily  povidone iodine 10% Solution 1 Application(s) Topical two times a day  senna 2 Tablet(s) Oral at bedtime  simethicone 80 milliGRAM(s) Chew every 6 hours    MEDICATIONS  (PRN):  acetaminophen     Tablet .. 650 milliGRAM(s) Oral every 6 hours PRN Temp greater or equal to 38C (100.4F), Mild Pain (1 - 3)  benzocaine/menthol Lozenge 1 Lozenge Oral three times a day PRN Sore Throat  bisacodyl 5 milliGRAM(s) Oral every 12 hours PRN Constipation  HYDROmorphone  Injectable 2 milliGRAM(s) IV Push every 4 hours PRN Severe Pain (7 - 10)  HYDROmorphone  Injectable 2 milliGRAM(s) IV Push once PRN Pain  oxyCODONE    IR 10 milliGRAM(s) Oral every 4 hours PRN Moderate Pain (4 - 6)  sodium chloride 0.9% lock flush 10 milliLiter(s) IV Push every 1 hour PRN Pre/post blood products, medications, blood draw, and to maintain line patency      Allergies    No Known Allergies    Intolerances        Review of Systems:    General:  No wt loss, fevers, chills, night sweats, fatigue   Eyes:  Good vision, no reported pain  ENT:  No sore throat, pain, runny nose, dysphagia  CV:  No pain, palpitations, hypo/hypertension  Resp:  No dyspnea, cough, tachypnea, wheezing  GI:  No pain, No nausea, No vomiting, No diarrhea, No constipation, No weight loss, No fever, No pruritis, No rectal bleeding, No melena, No dysphagia  :  No pain, bleeding, incontinence, nocturia  Muscle:  No pain, weakness  Neuro:  No weakness, tingling, memory problems  Psych:  No fatigue, insomnia, mood problems, depression  Endocrine:  No polyuria, polydypsia, cold/heat intolerance  Heme:  No petechiae, ecchymosis, easy bruisability  Skin:  No rash, tattoos, scars, edema      Vital Signs Last 24 Hrs  T(C): 36.4 (27 Mar 2025 08:40), Max: 36.7 (26 Mar 2025 18:05)  T(F): 97.6 (27 Mar 2025 08:40), Max: 98.1 (26 Mar 2025 18:05)  HR: 82 (27 Mar 2025 08:40) (82 - 95)  BP: 141/63 (27 Mar 2025 08:40) (97/52 - 141/63)  BP(mean): --  RR: 18 (27 Mar 2025 08:40) (18 - 18)  SpO2: 97% (27 Mar 2025 08:40) (91% - 97%)    Parameters below as of 27 Mar 2025 08:40  Patient On (Oxygen Delivery Method): room air        PHYSICAL EXAM:    Constitutional: NAD  HEENT: EOMI, throat clear  Neck: No LAD, supple  Respiratory: CTA and P  Cardiovascular: S1 and S2, RRR, no M  Gastrointestinal: BS+, soft, NT/ND, neg HSM,  Extremities: No peripheral edema, neg clubbing, cyanosis  Vascular: 2+ peripheral pulses  Neurological: A/O   Psychiatric: Normal mood, normal affect  Skin: No rashes      LABS:                RADIOLOGY & ADDITIONAL TESTS:

## 2025-03-27 NOTE — PROGRESS NOTE ADULT - SUBJECTIVE AND OBJECTIVE BOX
Neurology      S; patient seen. no neuro changes      Medications: MEDICATIONS  (STANDING):  albuterol/ipratropium for Nebulization 3 milliLiter(s) Nebulizer every 6 hours  aspirin  chewable 81 milliGRAM(s) Oral daily  atorvastatin 40 milliGRAM(s) Oral at bedtime  chlorhexidine 2% Cloths 1 Application(s) Topical daily  clopidogrel Tablet 75 milliGRAM(s) Oral daily  Dakins Solution - 1/4 Strength 1 Application(s) Topical every 8 hours  epoetin aleah-epbx (RETACRIT) Injectable 15762 Unit(s) IV Push <User Schedule>  hydrOXYzine hydrochloride 25 milliGRAM(s) Oral three times a day  ibuprofen  Tablet. 400 milliGRAM(s) Oral every 12 hours  lactulose Syrup 20 Gram(s) Oral once  meropenem  IVPB 500 milliGRAM(s) IV Intermittent every 12 hours  mineral oil enema 133 milliLiter(s) Rectal once  mupirocin 2% Ointment 1 Application(s) Topical <User Schedule>  oxyCODONE  ER Tablet 30 milliGRAM(s) Oral every 12 hours  pantoprazole    Tablet 40 milliGRAM(s) Oral before breakfast  polyethylene glycol 3350 17 Gram(s) Oral daily  povidone iodine 10% Solution 1 Application(s) Topical two times a day  senna 2 Tablet(s) Oral at bedtime  simethicone 80 milliGRAM(s) Chew every 6 hours    MEDICATIONS  (PRN):  acetaminophen     Tablet .. 650 milliGRAM(s) Oral every 6 hours PRN Temp greater or equal to 38C (100.4F), Mild Pain (1 - 3)  benzocaine/menthol Lozenge 1 Lozenge Oral three times a day PRN Sore Throat  bisacodyl 5 milliGRAM(s) Oral every 12 hours PRN Constipation  HYDROmorphone  Injectable 2 milliGRAM(s) IV Push every 4 hours PRN Severe Pain (7 - 10)  HYDROmorphone  Injectable 2 milliGRAM(s) IV Push once PRN Pain  oxyCODONE    IR 10 milliGRAM(s) Oral every 4 hours PRN Moderate Pain (4 - 6)  sodium chloride 0.9% lock flush 10 milliLiter(s) IV Push every 1 hour PRN Pre/post blood products, medications, blood draw, and to maintain line patency       Vitals:  Vital Signs Last 24 Hrs  T(C): 36.4 (27 Mar 2025 08:40), Max: 36.7 (26 Mar 2025 18:05)  T(F): 97.6 (27 Mar 2025 08:40), Max: 98.1 (26 Mar 2025 18:05)  HR: 82 (27 Mar 2025 08:40) (82 - 95)  BP: 141/63 (27 Mar 2025 08:40) (97/52 - 141/63)  BP(mean): --  RR: 18 (27 Mar 2025 08:40) (17 - 18)  SpO2: 97% (27 Mar 2025 08:40) (91% - 98%)    Parameters below as of 27 Mar 2025 08:40  Patient On (Oxygen Delivery Method): room air             General Appearance: Appropriately dressed and in no acute distress       Head: Normocephalic, atraumatic and no dysmorphic features  Ear, Nose, and Throat: Moist mucous membranes  CVS: S1S2+  Resp: No SOB, no wheeze or rhonchi  GI: soft NT/ND  Extremities: LE PVD : dusky toes noted   Skin: + decub      Neurological Exam:  Mental Status: Awake, alert and oriented x 2.  Able to follow simple and complex verbal commands. Able to name and repeat. fluent speech. No obvious aphasia or dysarthria noted.   Cranial Nerves: PERRL, EOMI, VFFC, sensation V1-V3 intact,  no obvious facial asymmetry, equal elevation of palate, scm/trap 5/5, tongue is midline on protrusion. no obvious papilledema on fundoscopic exam. hearing is grossly intact.   Motor: Normal bulk, tone and strength throughout uppers 4/ lowers 0-/1 5   Sensation: Intact to light touch and pinprick throughout.     Coordination: No dysmetria on FNF   Gait: deferred, wheelchair bound     Data/Labs/Imaging which I personally reviewed.      LABS:  no new labs       < from: CT Head No Cont (02.20.25 @ 18:47) >    ACC: [id number] EXAM:  CT BRAIN   ORDERED BY: GUY DE LA CRUZ     PROCEDURE DATE:  02/20/2025          INTERPRETATION:  CLINICAL INDICATION: Altered mental status    TECHNIQUE: Axial CT scanning of the brain was obtained from the skull   base to the vertex without the administration of intravenous contrast.   Reformatted coronal and sagittal images were subsequently obtained and   reviewed.    COMPARISON: None    FINDINGS:  There is no CT evidence of acute transcortical infarct. Age-related   involutional changes and chronic microvascular ischemic changes.    There is no hydrocephalus, mass effect, or acute intracranial hemorrhage.   No extra-axial collection. Basal cisterns are patent.    The visualized paranasal sinuses and mastoid air cells are clear.    The calvarium is intact.    IMPRESSION:  No evidence of acute transcortical infarct, acute intracranial   hemorrhage, or mass effect.    --- End of Report ---            CORTNEY REARDON MD; Attending Radiologist  This document has been electronically signed. Feb 20 2025  7:07PM    < end of copied text >

## 2025-03-27 NOTE — PROGRESS NOTE ADULT - PROBLEM SELECTOR PLAN 2
-CXR with elevated R hemidiaphragm (not present on CXR in December 2024)  -CT chest with LLL, RLL GGO, mucoid impacted airways. No clear PNA. Monitoring off ABX per ID  -Suggest Duoneb q6h  -S/p Mucomyst x5 days   -Incentive spirometry   -CXR 3/1 with improved R hemidiaphragm, low lung volumes on L

## 2025-03-27 NOTE — CHART NOTE - NSCHARTNOTEFT_GEN_A_CORE
NUTRITION FOLLOW UP NOTE    PATIENT SEEN FOR: malnutrition follow up    SOURCE: [x] Patient  [x] Current Medical Record  [] RN  [x] Family/support person at bedside (wife)  [] Patient unavailable/inappropriate  [] Other:    CHART REVIEWED/EVENTS NOTED.  [] No changes to nutrition care plan to note  [x] Nutrition Status:  -3/25 CT A/P LLL PNA-meropenem started  -s/p enema 3/26  -last HD 3/26    DIET ORDER:   Diet, Regular:   1000mL Fluid Restriction ([id number])  Low Sodium  No Carb Prosource TF     Qty per Day:  2  Supplement Feeding Modality:  Oral  Nepro Cans or Servings Per Day:  2       Frequency:  Daily (25)    CURRENT DIET ORDER IS:  [] Appropriate:  [] Inadequate:  [x] Other: see recommendations below    NUTRITION INTAKE/PROVISION:  [x] PO: Pt reports good appetite but mostly tolerating liquids (some solids too dry and difficult to swallow) and upset that he doesn't always get what he orders  -food preferences discussed in detail - will honor as able to maximize PO intake  -Pt also eating food from home  -likes Nepro, drinking 2/day and loves the Smoothies (but not the peanut butter flavor); amenable to trial of other protein supplements  -prosource TF is not appropriate PO supplement  [] Enteral Nutrition:  [] Parenteral Nutrition:    ANTHROPOMETRICS:  Drug Dosing Weight  Height (cm): 172.7 (03 Mar 2025 10:32)  Weight (kg): 90.7 (03 Mar 2025 10:32)  BMI (kg/m2): 30.4 (03 Mar 2025 10:32)  BSA (m2): 2.04 (03 Mar 2025 10:32)  Weights:   Daily Weight in k.5 (), Weight in k.5 (), Weight in k.2 (), Weight in k.7 (), Weight in k.7 (), Weight in k.2 (), 85 kg ()  *wts fluctuating since admission 2/2 edema and HD-related fluid shifts    MEDICATIONS:  MEDICATIONS  (STANDING):  albuterol/ipratropium for Nebulization 3 milliLiter(s) Nebulizer every 6 hours  aspirin  chewable 81 milliGRAM(s) Oral daily  atorvastatin 40 milliGRAM(s) Oral at bedtime  chlorhexidine 2% Cloths 1 Application(s) Topical daily  clopidogrel Tablet 75 milliGRAM(s) Oral daily  Dakins Solution - 1/4 Strength 1 Application(s) Topical every 8 hours  epoetin aleah-epbx (RETACRIT) Injectable 63332 Unit(s) IV Push <User Schedule>  hydrOXYzine hydrochloride 25 milliGRAM(s) Oral three times a day  ibuprofen  Tablet. 400 milliGRAM(s) Oral every 12 hours  lactobacillus acidophilus 1 Tablet(s) Oral two times a day with meals  meropenem  IVPB 500 milliGRAM(s) IV Intermittent every 12 hours  mupirocin 2% Ointment 1 Application(s) Topical <User Schedule>  oxyCODONE  ER Tablet 30 milliGRAM(s) Oral every 12 hours  pantoprazole    Tablet 40 milliGRAM(s) Oral before breakfast  polyethylene glycol 3350 17 Gram(s) Oral daily  povidone iodine 10% Solution 1 Application(s) Topical two times a day  senna 2 Tablet(s) Oral at bedtime  simethicone 80 milliGRAM(s) Chew every 6 hours    MEDICATIONS  (PRN):  acetaminophen     Tablet .. 650 milliGRAM(s) Oral every 6 hours PRN Temp greater or equal to 38C (100.4F), Mild Pain (1 - 3)  benzocaine/menthol Lozenge 1 Lozenge Oral three times a day PRN Sore Throat  bisacodyl 5 milliGRAM(s) Oral every 12 hours PRN Constipation  HYDROmorphone  Injectable 2 milliGRAM(s) IV Push every 4 hours PRN Severe Pain (7 - 10)  HYDROmorphone  Injectable 2 milliGRAM(s) IV Push once PRN Pain  oxyCODONE    IR 10 milliGRAM(s) Oral every 4 hours PRN Moderate Pain (4 - 6)  sodium chloride 0.9% lock flush 10 milliLiter(s) IV Push every 1 hour PRN Pre/post blood products, medications, blood draw, and to maintain line patency    A1C with Estimated Average Glucose Result: 5.7 % (24 @ 06:44)  A1C with Estimated Average Glucose Result: 4.9 % (10-09-24 @ 13:30)    NUTRITIONALLY PERTINENT MEDICATIONS/LABS:  [x] Reviewed  [x] Relevant notes on medications/labs:  -IV Abx  -probiotic    EDEMA:  [x] Reviewed  [x] Relevant notes: b/l feet 2+ edema (3/26 RN flowsheets)    GI/ I&O:  [x] Reviewed  [x] Relevant notes: Pt denies any N/V, last BM 3/21 and then 3/26 on RN flowsheets  [x] Other: Miralax, Senna, and Bisacodyl PRN    SKIN:   [] No pressure injuries documented, per nursing flowsheet  [x] Pressure injury previously noted: WOCN FU 3/26 - left buttock deep tissue injury, sacrum/right ischium unstageable, and BLE severe PAD with wounds  [] Change in pressure injury documentation:  [] Other:    ESTIMATED NEEDS:  [x] No change:  [] Updated:  Energy: 0195-7289 kcal/day (30-35 kcal/kg)  Protein: 83.8-97.7 g/day (1.2-1.4 g/kg)  Fluid:   ml/day or [x] defer to team  Based on: IBW 69.8 kg    NUTRITION DIAGNOSIS:  [x] Prior Dx: 1) moderate chronic malnutrition and 2) increased nutrient needs  [] New Dx:    EDUCATION:  [x] Yes: fluid restriction; try and consume solids before liquids; adequate protein/calorie intake for wound healing  [] Not appropriate/warranted    NUTRITION CARE PLAN:  1. Diet: continue low sodium diet; defer to MD team for fluid restriction  2. Supplements: continue Nepro 2x/day (420 kcal, 19 g Pro/8oz); recommend LPS 2x/day (100 kcal, 15 g Pro/packet); d/c prosource TF free  3. Multivitamin/mineral supplementation: recommend nephro-margaret daily for wound healing  4. RD to honor food preferences as offered/able to maximize PO intake    [] Achieved - Continue current nutrition intervention(s)  [] Current medical condition precludes nutrition intervention at this time.    MONITORING AND EVALUATION:   RD remains available upon request and will follow up per protocol.    Guadalupe Valderrama MPH, RD, CDN  Available on [employer] TEAMS

## 2025-03-27 NOTE — PROGRESS NOTE ADULT - ASSESSMENT
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  came in after fall and for HD.     2/20 CTH neg   \Na 123 --> now 133   A1c 5.7   TSH WNL 3.46   o/e 2/21 AAOx2, uppers 4/5, lowers limited .  2/23; family bedside upset that he has pain in leg after he was moved.  patient going for imaging now.   s/p R IJ permacath by IR 2/25 2/27 AAOx3   sopoke with family bedisde 3/17 wife says mental status okay, sometimes up and down but good   3/18 was told he has "new neuropathy" spoke iwth patient and wife at bedside. states its the same from before not a new issue.    3/21 mental status stable. c/o pubic cathter   3/23 c/o nausea today   no new changes     Imrpssion  1) AMS, waxing and waing , likely multifactorial from infection, metabolic/electrolyte derrangements/ delerium / medication effect , ureemia which is imporving   2) polio  3) fall 2/2 weakness  4) hyponatremia to 123 2/20  improved 133 -->136;   back down to 129     - still on meropenum IV; if dischare planning need a plan ; f/u ID   - patient wants home f/u PT   - Na 129 on most recent blood work.  slow correcttion  ; f/u renal    - AVF outpatient l; f/u vascular     f/u vascular; no vascluar options ; f/u with Messi from vasculare to schedule outpatient procedure   - on DAPT   - was getting oxycodone ER 30mg BID and oxy PRN IR i10 and dilauded PRN ;   consider gabapentin 200mg TID or lyrica 50mg BID fo nueorpathy if no objection ; patient states he still has pain but its better.  vascular has seen patient.  poor prognosis of LLE   - f/u psych   - limit sedating meds   - continue to monitor and correct metabolic derrangements .  - delerium precuations.   - frequent re orientation  - check b12, RPR, ammonia level if never checked    - PT/OT   - check FS, glucose control <180  - GI/DVT ppx  - Thank you for allowing me to participate in the care of this patient. Call with questions.   dc planning       Paul Limon MD  Vascular Neurology  Office: [phone]

## 2025-03-27 NOTE — PROGRESS NOTE ADULT - SUBJECTIVE AND OBJECTIVE BOX
Patient is a 74y old  Male who presents with a chief complaint of ESRD requiring HD, uremia (27 Mar 2025 12:49)      SUBJECTIVE / OVERNIGHT EVENTS: ptn is complaining that pain post decubital and left knee debridement has been severe. his daughter is on speaker phone, wife is at bedside. his pain is controlled when meds are administered. he is very uncomfortable due to the pain at the site of decubitus debridement. lyrica helped his pain before but made him drowsy, griffin with gabapentin. he doesnt want them to be resumed. will cont dilaudid, oxycodone, oxycontin, ibuprofen. he is on Meropenem for aspiration PNA/HCAP    MEDICATIONS  (STANDING):  albuterol/ipratropium for Nebulization 3 milliLiter(s) Nebulizer every 6 hours  aspirin  chewable 81 milliGRAM(s) Oral daily  atorvastatin 40 milliGRAM(s) Oral at bedtime  chlorhexidine 2% Cloths 1 Application(s) Topical daily  clopidogrel Tablet 75 milliGRAM(s) Oral daily  Dakins Solution - 1/4 Strength 1 Application(s) Topical every 8 hours  epoetin aleah-epbx (RETACRIT) Injectable 25822 Unit(s) IV Push <User Schedule>  heparin   Injectable 5000 Unit(s) SubCutaneous every 8 hours  hydrOXYzine hydrochloride 25 milliGRAM(s) Oral three times a day  ibuprofen  Tablet. 400 milliGRAM(s) Oral every 12 hours  lactobacillus acidophilus 1 Tablet(s) Oral two times a day with meals  meropenem  IVPB 500 milliGRAM(s) IV Intermittent every 12 hours  mupirocin 2% Ointment 1 Application(s) Topical <User Schedule>  oxyCODONE  ER Tablet 30 milliGRAM(s) Oral every 12 hours  pantoprazole    Tablet 40 milliGRAM(s) Oral before breakfast  polyethylene glycol 3350 17 Gram(s) Oral daily  povidone iodine 10% Solution 1 Application(s) Topical two times a day  senna 2 Tablet(s) Oral at bedtime  simethicone 80 milliGRAM(s) Chew every 6 hours    MEDICATIONS  (PRN):  acetaminophen     Tablet .. 650 milliGRAM(s) Oral every 6 hours PRN Temp greater or equal to 38C (100.4F), Mild Pain (1 - 3)  benzocaine/menthol Lozenge 1 Lozenge Oral three times a day PRN Sore Throat  bisacodyl 5 milliGRAM(s) Oral every 12 hours PRN Constipation  HYDROmorphone  Injectable 2 milliGRAM(s) IV Push every 4 hours PRN Severe Pain (7 - 10)  HYDROmorphone  Injectable 2 milliGRAM(s) IV Push once PRN Pain  oxyCODONE    IR 10 milliGRAM(s) Oral every 4 hours PRN Moderate Pain (4 - 6)  sodium chloride 0.9% lock flush 10 milliLiter(s) IV Push every 1 hour PRN Pre/post blood products, medications, blood draw, and to maintain line patency      Vital Signs Last 24 Hrs  T(F): 97.6 (03-27-25 @ 08:40), Max: 98.1 (03-26-25 @ 18:05)  HR: 82 (03-27-25 @ 08:40) (82 - 95)  BP: 141/63 (03-27-25 @ 08:40) (97/52 - 141/63)  RR: 18 (03-27-25 @ 08:40) (18 - 18)  SpO2: 97% (03-27-25 @ 08:40) (91% - 97%)  Telemetry:   CAPILLARY BLOOD GLUCOSE        I&O's Summary    26 Mar 2025 07:01  -  27 Mar 2025 07:00  --------------------------------------------------------  IN: 0 mL / OUT: 350 mL / NET: -350 mL        PHYSICAL EXAM:  GENERAL: NAD, well-developed  HEAD:  Atraumatic, Normocephalic  EYES: EOMI, PERRLA, conjunctiva and sclera clear  NECK: Supple, No JVD  CHEST/LUNG: Clear to auscultation bilaterally; No wheeze  HEART: Regular rate and rhythm; No murmurs, rubs, or gallops  ABDOMEN: Soft, Nontender, Nondistended; Bowel sounds present  EXTREMITIES:  2+ Peripheral Pulses, No clubbing, cyanosis, or edema  PSYCH: AAOx3  NEUROLOGY: non-focal  SKIN: No rashes or lesions    LABS:                    RADIOLOGY & ADDITIONAL TESTS:    Imaging Personally Reviewed:    Consultant(s) Notes Reviewed:      Care Discussed with Consultants/Other Providers:

## 2025-03-27 NOTE — PROGRESS NOTE ADULT - ASSESSMENT
74 year old male with CKD, sp polio, paraplegia with associated neurogenic bladder s/p suprapubic catheter who was admitted s/p fall on 2/16.   CKD - ESRD, now s/p DEYA yuliyageorgina 2/17, on HD  ruled out cellulitis around suprapubic cath  2/20 s/p RRT for tremors and confusion -- pt with chronic tremors although notably worse  2/20 CXR with clear lungs   SPC site with chronic/stable inflammatory changes, no drainage - no sign of SSTI  CTA abd with occlusion of b/l external iliac arteries and b/l superficial femoral arteries with distal reconstitution at popliteal arteries; patent three vessel runoff of RLE with 2 vessel runoff of LLE with occlusion of L mid anterior tibial artery with distal reconstitution  Vascular following for worsening PAD with worsening ischemic changes   -3/3 s/p RLE angiogram b/l iliac artery stents   mental status at baseline    3/16 no tenderness at suprapubic catheter site, no visible erythema at catheter site on exam  UA w/ many epithelial cells, Ucx negative  s/p zosyn 3/14-3/16  3/19 SPC site remains without erythema but now noted with some tan crusting on dressing  3/23 reporting vomiting x5 episodes, no diarrhea or other focal sx  Treated for possible SPC site infection with bactrim 3/19-3/25  3/25 CT Chest noted RLL consolidative opacities, LLL opacities - pneumonia vs atelectasis, few ill defined nodular opacities in LLL infectious vs inflammatory  3/25 CTAP with subcutaneous gas and infiltration tracking along medial R buttock, possibly related to decubitus wound with gas tracking from the external environment, possible necrotizing air forming infection; rectal wall thickening suggestive of proctitis; b/l mild hydroureteronephrosis to level of bladder with no obstruction and diffuse urothelial thickening of b/l renal collecting systems and ureters similar to 2/20  MRSA screen has been negative   pt with no resp symptoms- no cough, dyspnea or pain, saturating well on RA, low suspicion for acute pneumonia   3/26 s/p bedside debridement of L knee wound  as dry eschar  from wound edges without drainage and bedside excisional debridement of unstageable sacrum to left buttock into perineum evolving unstageable pressure injury through necrotic dermis into nonviable subcutaneous tissues, debulking debridement of necrotic tissue done by Wound care; other wounds noted including L calf to ankle wound with liquefaction necrotic drainage; R ischium wound with moist pink granular tissue and L buttock fading DTI  remains afebrile, WBC overall downtrended, reports decreased pain overall and feeling much better     Recommendations:   Continue meropenem (renally dosed) based on prior cultures  -given location of wounds with suspected OM, patient at risk of contamination, further skin/wound breakdown due to pressure 2/2 being bedbound and prolonged course of antibiotics unlikely to resolve this and will increase risk of C.diff, development of resistant organisms making treatment of infection more difficult in the future in addition to antibiotic adverse effects, therefore, recommended short course 10d course until 4/8/25   With continued local wound care, nutrition, offloading as able  HD per renal    Monitor temps/WBC  Wound care following   Continue rest of care per primary team       Bridgette Ramirez M.D.  [facility]  Available on [employer] TEAMS - *PREFERRED*  [phone]  After 5pm on weekdays and all day on weekends - please call [phone]     Thank you for consulting us and involving us in the management of this patients case. In addition to reviewing history, imaging, documents, labs, microbiology, took into account antibiotic stewardship, local antibiogram and infection control strategies and potential transmission issues at time of treatment decision making process.  74 year old male with CKD, sp polio, paraplegia with associated neurogenic bladder s/p suprapubic catheter who was admitted s/p fall on 2/16.   CKD - ESRD, now s/p DEYA [place] 2/17, on HD  ruled out cellulitis around suprapubic cath  2/20 s/p RRT for tremors and confusion -- pt with chronic tremors although notably worse  2/20 CXR with clear lungs   SPC site with chronic/stable inflammatory changes, no drainage - no sign of SSTI  CTA abd with occlusion of b/l external iliac arteries and b/l superficial femoral arteries with distal reconstitution at popliteal arteries; patent three vessel runoff of RLE with 2 vessel runoff of LLE with occlusion of L mid anterior tibial artery with distal reconstitution  Vascular following for worsening PAD with worsening ischemic changes   -3/3 s/p RLE angiogram b/l iliac artery stents   mental status at baseline    3/16 no tenderness at suprapubic catheter site, no visible erythema at catheter site on exam  UA w/ many epithelial cells, Ucx negative  s/p zosyn 3/14-3/16  3/19 SPC site remains without erythema but now noted with some tan crusting on dressing  3/23 reporting vomiting x5 episodes, no diarrhea or other focal sx  Treated for possible SPC site infection with bactrim 3/19-3/25  3/25 CT Chest noted RLL consolidative opacities, LLL opacities - pneumonia vs atelectasis, few ill defined nodular opacities in LLL infectious vs inflammatory  3/25 CTAP with subcutaneous gas and infiltration tracking along medial R buttock, possibly related to decubitus wound with gas tracking from the external environment, possible necrotizing air forming infection; rectal wall thickening suggestive of proctitis; b/l mild hydroureteronephrosis to level of bladder with no obstruction and diffuse urothelial thickening of b/l renal collecting systems and ureters similar to 2/20  MRSA screen has been negative   pt with no resp symptoms- no cough, dyspnea or pain, may have aspirated with vomiting recently, saturating well on RA  3/26 s/p bedside debridement of L knee wound  as dry eschar  from wound edges without drainage and bedside excisional debridement of unstageable sacrum to left buttock into perineum evolving unstageable pressure injury through necrotic dermis into nonviable subcutaneous tissues, debulking debridement of necrotic tissue done by Wound care; other wounds noted including L calf to ankle wound with liquefaction necrotic drainage; R ischium wound with moist pink granular tissue and L buttock fading DTI  remains afebrile, WBC overall downtrended, reports decreased pain overall and feeling much better     Recommendations:   Continue meropenem (renally dosed) based on prior cultures  -given location of wounds with suspected OM, patient at risk of contamination, further skin/wound breakdown due to pressure 2/2 being bedbound and prolonged course of antibiotics unlikely to resolve this and will increase risk of C.diff, development of resistant organisms making treatment of infection more difficult in the future in addition to antibiotic adverse effects, therefore, recommended short course 10d course until 4/3/25   With continued local wound care, nutrition, offloading as able  HD per renal    Monitor temps/WBC  Wound care following   Continue rest of care per primary team       Bridegtte Ramirez M.D.  [facility]  Available on [employer] TEAMS - *PREFERRED*  [phone]  After 5pm on weekdays and all day on weekends - please call [phone]     Thank you for consulting us and involving us in the management of this patients case. In addition to reviewing history, imaging, documents, labs, microbiology, took into account antibiotic stewardship, local antibiogram and infection control strategies and potential transmission issues at time of treatment decision making process.

## 2025-03-27 NOTE — PROGRESS NOTE ADULT - SUBJECTIVE AND OBJECTIVE BOX
Overnight events noted      VITAL:  T(C): , Max: 36.7 (03-26-25 @ 18:05)  T(F): , Max: 98.1 (03-26-25 @ 18:05)  HR: 90 (03-27-25 @ 00:00)  BP: 129/63 (03-27-25 @ 00:00)  BP(mean): --  RR: 18 (03-27-25 @ 00:00)  SpO2: 96% (03-27-25 @ 00:00)  Wt(kg): --      PHYSICAL EXAM:  Constitutional: alert, NAD  HEENT: NCAT, DMM  Neck: Supple, No JVD  Respiratory: CTA-b/l  Cardiovascular: RRR s1s2, no m/r/g  Gastrointestinal: BS+, soft, NT/ND  : (+)suprapubic cath  Extremities: 1+ b/l LE edema  Neurological: reduced generalized strength  Back: no CVAT b/l  Skin: (+)cyanotic changes b/l feet  Access: RIJ tunneled cath          IMPRESSION: 74M w/ polio, neurogenic bladder/suprapubic catheter, 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 Last dialyzed yesterday; due for next HD tomorrow.    (2)Hyponatremia - receiving high-Na+ bath with HD    (3)Anemia - s/p IV iron; on Retacrit with HD    (4)PAD - s/p LE bypass 3/3/25 - cyanotic changes of toes b/l - for further management after discharge    (5)CV - multifactorial LE edema; improving with increasing intradialytic UF, and with improving nutritional parameters    (6)Neuro - reduced generalized strength - getting PT/awaiting NYDIA        RECOMMEND:   (1)Next HD tomorrow - 1.5L UF as able                Rafita Denny MD  [facility]  Office/on call physician: [phone]  Cell (7a-7p): [phone]       admits to incisional left knee pain/pain from debridement yesterday      VITAL:  T(C): , Max: 36.7 (03-26-25 @ 18:05)  T(F): , Max: 98.1 (03-26-25 @ 18:05)  HR: 90 (03-27-25 @ 00:00)  BP: 129/63 (03-27-25 @ 00:00)  BP(mean): --  RR: 18 (03-27-25 @ 00:00)  SpO2: 96% (03-27-25 @ 00:00)  Wt(kg): --      PHYSICAL EXAM:  Constitutional: alert, NAD  HEENT: NCAT, DMM  Neck: Supple, No JVD  Respiratory: CTA-b/l  Cardiovascular: RRR s1s2, no m/r/g  Gastrointestinal: BS+, soft, NT/ND  : (+)suprapubic cath  Extremities: 1+ b/l LE edema  Neurological: reduced generalized strength  Back: no CVAT b/l  Skin: (+)cyanotic changes b/l feet  Access: RIJ tunneled cath          IMPRESSION: 74M w/ polio, neurogenic bladder/suprapubic catheter, 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 Last dialyzed yesterday; due for next HD tomorrow.    (2)Hyponatremia - receiving high-Na+ bath with HD    (3)Anemia - s/p IV iron; on Retacrit with HD    (4)PAD - s/p LE bypass 3/3/25 - cyanotic changes of toes b/l - for further management after discharge    (5)CV - multifactorial LE edema; improving with increasing intradialytic UF, and with improving nutritional parameters    (6)Neuro - reduced generalized strength - getting PT/awaiting NYDIA        RECOMMEND:   (1)Next HD tomorrow - 1.5L UF as able                Rafita Denny MD  [facility]  Office/on call physician: [phone]  Cell (7a-7p): [phone]

## 2025-03-27 NOTE — PROGRESS NOTE ADULT - SUBJECTIVE AND OBJECTIVE BOX
Subjective: Patient seen and examined. No new events except as noted.     REVIEW OF SYSTEMS:    CONSTITUTIONAL: +weakness, fevers or chills  EYES/ENT: No visual changes;  No vertigo or throat pain   NECK: No pain or stiffness  RESPIRATORY: No cough, wheezing, hemoptysis; No shortness of breath  CARDIOVASCULAR: No chest pain or palpitations  GASTROINTESTINAL: No abdominal or epigastric pain. No nausea, vomiting, or hematemesis; No diarrhea or constipation. No melena or hematochezia.  GENITOURINARY: No dysuria, frequency or hematuria  NEUROLOGICAL: No numbness or weakness  SKIN: No itching, burning, rashes, or lesions   All other review of systems is negative unless indicated above.    MEDICATIONS:  MEDICATIONS  (STANDING):  albuterol/ipratropium for Nebulization 3 milliLiter(s) Nebulizer every 6 hours  aspirin  chewable 81 milliGRAM(s) Oral daily  atorvastatin 40 milliGRAM(s) Oral at bedtime  chlorhexidine 2% Cloths 1 Application(s) Topical daily  clopidogrel Tablet 75 milliGRAM(s) Oral daily  Dakins Solution - 1/4 Strength 1 Application(s) Topical every 8 hours  epoetin aleah-epbx (RETACRIT) Injectable 12183 Unit(s) IV Push <User Schedule>  hydrOXYzine hydrochloride 25 milliGRAM(s) Oral three times a day  ibuprofen  Tablet. 400 milliGRAM(s) Oral every 12 hours  lactulose Syrup 20 Gram(s) Oral once  meropenem  IVPB 500 milliGRAM(s) IV Intermittent every 12 hours  mineral oil enema 133 milliLiter(s) Rectal once  mupirocin 2% Ointment 1 Application(s) Topical <User Schedule>  oxyCODONE  ER Tablet 30 milliGRAM(s) Oral every 12 hours  pantoprazole    Tablet 40 milliGRAM(s) Oral before breakfast  polyethylene glycol 3350 17 Gram(s) Oral daily  povidone iodine 10% Solution 1 Application(s) Topical two times a day  senna 2 Tablet(s) Oral at bedtime  simethicone 80 milliGRAM(s) Chew every 6 hours      PHYSICAL EXAM:  T(C): 36.4 (03-27-25 @ 08:40), Max: 36.7 (03-26-25 @ 18:05)  HR: 82 (03-27-25 @ 08:40) (82 - 95)  BP: 141/63 (03-27-25 @ 08:40) (97/52 - 141/63)  RR: 18 (03-27-25 @ 08:40) (18 - 18)  SpO2: 97% (03-27-25 @ 08:40) (91% - 97%)  Wt(kg): --  I&O's Summary    26 Mar 2025 07:01  -  27 Mar 2025 07:00  --------------------------------------------------------  IN: 0 mL / OUT: 350 mL / NET: -350 mL          Appearance: NAD  HEENT:  Dry oral mucosa, PERRL, EOMI	  Lymphatic: No lymphadenopathy  Cardiovascular: Normal S1 S2, No JVD, No murmurs, No edema  Respiratory: Lungs clear to auscultation	  Psychiatry: A & O x 3, Mood & affect appropriate  Skin: No rashes, No ecchymoses, No cyanosis	  Neurologic: Non-focal  GI/Abd: Soft, obese, nontender. Dressing to Q C/D/I. Suprapubic catheter in place  Ext: Palp femoral pulses bilat. BLE atrophic, minimal dorsi/plantarflexion bilaterally. Sensation grossly intact. LLE edematous compared to R, erythema to right toes/forefoot. mottling of right toes/forefoot/heel  LLE:  small superficial abrasion, +edema and +ecchymosis over L knee  +TTP over L knee, no TTP along remainder of extremity; compartments soft  Limited ROM at knee 2/2 pain  No gross varus/valgus laxity, but assessment limited 2/2 pain  Motor: TA/EHL/GS/FHL intact  Sensory: DP/SP/Tib/Jordan/Saph SILT  +DP pulse (symmetric relative to contralateral side), WWP   pressure wound from the LLE immobilizer posteriorly proximal to the knee.  Vascular: Peripheral pulses palpable 2+ bilaterally      LABS:    CARDIAC MARKERS:                  proBNP:   Lipid Profile:   HgA1c:   TSH:             TELEMETRY: 	    ECG:  	  RADIOLOGY:   DIAGNOSTIC TESTING:  [ ] Echocardiogram:  [ ]  Catheterization:  [ ] Stress Test:    OTHER:

## 2025-03-27 NOTE — PROGRESS NOTE ADULT - SUBJECTIVE AND OBJECTIVE BOX
[facility]  SABINO Griffin Y. Patel, S. Shah, G. Julio César  [phone]  ([phone] - weekdays after 5pm and weekends)    Name: BRIAN DEMPSEY  Age/Gender: 74y Male  MRN: [id number]    Interval History:  Patient seen and examined this morning.   No new complaints noted.  Notes reviewed  No concerning overnight events  Afebrile   Allergies: No Known Allergies      Objective:  Vitals:   T(F): 97.6 (03-27-25 @ 08:40), Max: 98.1 (03-26-25 @ 18:05)  HR: 82 (03-27-25 @ 08:40) (82 - 95)  BP: 141/63 (03-27-25 @ 08:40) (97/52 - 141/63)  RR: 18 (03-27-25 @ 08:40) (18 - 18)  SpO2: 97% (03-27-25 @ 08:40) (91% - 97%)  Physical Examination:  General: no acute distress  HEENT: normocephalic, atraumatic  Respiratory: decreased breath sounds  Cardiovascular: S1S2 present, normal rate  Gastrointestinal: NT, ND, +SPC  Extremities: ischemic changes LE, LE edema  Skin: no visible rash    Laboratory Studies:  CBC:   WBC Trend:  10.94 03-24-25 @ 07:13    CMP:   Creatinine: 4.04 mg/dL (03-24-25 @ 07:13)    Microbiology: reviewed   Culture - Urine (collected 03-15-25 @ 07:08)  Source: Catheterized Catheterized  Final Report (03-16-25 @ 08:38):    <10,000 CFU/mL Normal Urogenital Catie    Radiology: reviewed     Medications:  acetaminophen     Tablet .. 650 milliGRAM(s) Oral every 6 hours PRN  albuterol/ipratropium for Nebulization 3 milliLiter(s) Nebulizer every 6 hours  aspirin  chewable 81 milliGRAM(s) Oral daily  atorvastatin 40 milliGRAM(s) Oral at bedtime  benzocaine/menthol Lozenge 1 Lozenge Oral three times a day PRN  bisacodyl 5 milliGRAM(s) Oral every 12 hours PRN  chlorhexidine 2% Cloths 1 Application(s) Topical daily  clopidogrel Tablet 75 milliGRAM(s) Oral daily  Dakins Solution - 1/4 Strength 1 Application(s) Topical every 8 hours  epoetin aleah-epbx (RETACRIT) Injectable 44184 Unit(s) IV Push <User Schedule>  HYDROmorphone  Injectable 2 milliGRAM(s) IV Push every 4 hours PRN  HYDROmorphone  Injectable 2 milliGRAM(s) IV Push once PRN  hydrOXYzine hydrochloride 25 milliGRAM(s) Oral three times a day  ibuprofen  Tablet. 400 milliGRAM(s) Oral every 12 hours  lactulose Syrup 20 Gram(s) Oral once  meropenem  IVPB 500 milliGRAM(s) IV Intermittent every 12 hours  mineral oil enema 133 milliLiter(s) Rectal once  mupirocin 2% Ointment 1 Application(s) Topical <User Schedule>  oxyCODONE    IR 10 milliGRAM(s) Oral every 4 hours PRN  oxyCODONE  ER Tablet 30 milliGRAM(s) Oral every 12 hours  pantoprazole    Tablet 40 milliGRAM(s) Oral before breakfast  polyethylene glycol 3350 17 Gram(s) Oral daily  povidone iodine 10% Solution 1 Application(s) Topical two times a day  senna 2 Tablet(s) Oral at bedtime  simethicone 80 milliGRAM(s) Chew every 6 hours  sodium chloride 0.9% lock flush 10 milliLiter(s) IV Push every 1 hour PRN    Current Antimicrobials:  meropenem  IVPB 500 milliGRAM(s) IV Intermittent every 12 hours    Prior/Completed Antimicrobials:  piperacillin/tazobactam IVPB.  piperacillin/tazobactam IVPB.  piperacillin/tazobactam IVPB.-  piperacillin/tazobactam IVPB.-  trimethoprim   80 mG/sulfamethoxazole 400 mG  trimethoprim  160 mG/sulfamethoxazole 800 mG  vancomycin  IVPB  vancomycin  IVPB

## 2025-03-27 NOTE — PROGRESS NOTE ADULT - ASSESSMENT
74 year old man with OSNDRA on CKD requiring HD, now with nausea    1. ESRD on HD (new)     2. Nausea/dyspepsia  - improved  -favor r/t newly started HD  -PPI daily for GERD   -tolerating diet    3. Proctitis d/t constipation which has since resolved   -maintain on senna qhs with Miralax twice daily   -enema as needed     4. Decubitus Ulcer   CT noted, f/u primary, ID and wound care teams                 I had a prolonged conversation with the patient regarding the hospital course, differential diagnosis, results of diagnostic tests this far, and therapeutic modalities available. Plan of care discussed with the patient after the evaluation. Patient expresses a clear understanding of the plan of care.  Sixty five minutes spent on the total encounter, of which more than fifty percent of the encounter was spent on counseling and/or coordinating care by the attending physician.  Advanced care planning forms were discussed. Code status including forceful chest compressions, defibrillation and intubation were discussed. The risks benefits and alternatives to pertinent gastrointestinal procedures and interventions were discussed in detail and all questions were answered. Duration: 15 Minutes.    [facility]  Clive Hutson M.D.   [address] [place] [address]  Office: [phone]

## 2025-03-27 NOTE — PROGRESS NOTE ADULT - PROBLEM SELECTOR PLAN 1
CT chest with new RLL consolidation and LLL opacities, tracheal debris  -Possibly aspiration related s/p episode of vomiting  -Continue ABX  -Continue bronchodilators  -Keep sats >90% with o2 PRN.

## 2025-03-27 NOTE — PROGRESS NOTE ADULT - SUBJECTIVE AND OBJECTIVE BOX
Date of Service: 03-27-25 @ 12:50    Patient is a 74y old  Male who presents with a chief complaint of ESRD requiring HD, uremia (27 Mar 2025 11:33)      Any change in ROS:           MEDICATIONS  (STANDING):  albuterol/ipratropium for Nebulization 3 milliLiter(s) Nebulizer every 6 hours  aspirin  chewable 81 milliGRAM(s) Oral daily  atorvastatin 40 milliGRAM(s) Oral at bedtime  chlorhexidine 2% Cloths 1 Application(s) Topical daily  clopidogrel Tablet 75 milliGRAM(s) Oral daily  Dakins Solution - 1/4 Strength 1 Application(s) Topical every 8 hours  epoetin aleah-epbx (RETACRIT) Injectable 30029 Unit(s) IV Push <User Schedule>  hydrOXYzine hydrochloride 25 milliGRAM(s) Oral three times a day  ibuprofen  Tablet. 400 milliGRAM(s) Oral every 12 hours  lactobacillus acidophilus 1 Tablet(s) Oral two times a day with meals  meropenem  IVPB 500 milliGRAM(s) IV Intermittent every 12 hours  mupirocin 2% Ointment 1 Application(s) Topical <User Schedule>  oxyCODONE  ER Tablet 30 milliGRAM(s) Oral every 12 hours  pantoprazole    Tablet 40 milliGRAM(s) Oral before breakfast  polyethylene glycol 3350 17 Gram(s) Oral daily  povidone iodine 10% Solution 1 Application(s) Topical two times a day  senna 2 Tablet(s) Oral at bedtime  simethicone 80 milliGRAM(s) Chew every 6 hours    MEDICATIONS  (PRN):  acetaminophen     Tablet .. 650 milliGRAM(s) Oral every 6 hours PRN Temp greater or equal to 38C (100.4F), Mild Pain (1 - 3)  benzocaine/menthol Lozenge 1 Lozenge Oral three times a day PRN Sore Throat  bisacodyl 5 milliGRAM(s) Oral every 12 hours PRN Constipation  HYDROmorphone  Injectable 2 milliGRAM(s) IV Push once PRN Pain  HYDROmorphone  Injectable 2 milliGRAM(s) IV Push every 4 hours PRN Severe Pain (7 - 10)  oxyCODONE    IR 10 milliGRAM(s) Oral every 4 hours PRN Moderate Pain (4 - 6)  sodium chloride 0.9% lock flush 10 milliLiter(s) IV Push every 1 hour PRN Pre/post blood products, medications, blood draw, and to maintain line patency    Vital Signs Last 24 Hrs  T(C): 36.4 (27 Mar 2025 08:40), Max: 36.7 (26 Mar 2025 18:05)  T(F): 97.6 (27 Mar 2025 08:40), Max: 98.1 (26 Mar 2025 18:05)  HR: 82 (27 Mar 2025 08:40) (82 - 95)  BP: 141/63 (27 Mar 2025 08:40) (97/52 - 141/63)  BP(mean): --  RR: 18 (27 Mar 2025 08:40) (18 - 18)  SpO2: 97% (27 Mar 2025 08:40) (91% - 97%)    Parameters below as of 27 Mar 2025 08:40  Patient On (Oxygen Delivery Method): room air        I&O's Summary    26 Mar 2025 07:01  -  27 Mar 2025 07:00  --------------------------------------------------------  IN: 0 mL / OUT: 350 mL / NET: -350 mL          Physical Exam:   GENERAL: NAD, well-groomed, well-developed  HEENT: VINNY/   Atraumatic, Normocephalic  ENMT: No tonsillar erythema, exudates, or enlargement; Moist mucous membranes, Good dentition, No lesions  NECK: Supple, No JVD, Normal thyroid  CHEST/LUNG: Clear to auscultaion, ; No rales, rhonchi, wheezing, or rubs  CVS: Regular rate and rhythm; No murmurs, rubs, or gallops  GI: : Soft, Nontender, Nondistended; Bowel sounds present  NERVOUS SYSTEM:  Alert & Oriented X3, Good concentration; Motor Strength 5/5 B/L upper and lower extremities; DTRs 2+ intact and symmetric  EXTREMITIES:  2+ Peripheral Pulses, No clubbing, cyanosis, or edema  LYMPH: No lymphadenopathy noted  SKIN: No rashes or lesions  ENDOCRINOLOGY: No Thyromegaly  PSYCH: Appropriate    Labs:                              9.9    10.94 )-----------( 245      ( 24 Mar 2025 07:13 )             33.4     03-24    129[L]  |  93[L]  |  34[H]  ----------------------------<  100[H]  4.6   |  21[L]  |  4.04[H]        CAPILLARY BLOOD GLUCOSE              Studies  < from: CT Chest w/ IV Cont (03.25.25 @ 13:43) >    ACC: [id number] EXAM:  CT CHEST IC   ORDERED BY:  NELL TOLBERT     ACC: [id number] EXAM:  CT ABDOMEN AND PELVIS IC   ORDERED BY:  NELL TOLBERT     PROCEDURE DATE:  03/25/2025          INTERPRETATION:  CLINICAL INFORMATION: Evaluate for pneumonia. Abdominal   pain.    COMPARISON: CT chest 2/23/2025. CT abdomen and pelvis 2/20/2025.    CONTRAST/COMPLICATIONS:  IV Contrast: Omnipaque 350  90 cc administered   10 cc discarded  Oral Contrast: NONE    PROCEDURE:  CT of the Chest, Abdomen and Pelvis was performed.  Sagittal and coronal reformats were performed.    FINDINGS:  CHEST:  LUNGS AND LARGE AIRWAYS: Patent central airways. Small amount of debris   within the trachea. Right lower lobe consolidative opacities. Additional   consolidative opacities in the left lower lobe, possibly atelectasis. A   few ill-defined nodular opacities in the left lower lobe measuring up to   6 mm.  PLEURA: Small left pleural effusion.  VESSELS: Right IJ approach catheter with tip in the superior cavoatrial   junction. Linear nonocclusive filling defect within the right internal   jugular vein (3:1). Atherosclerotic changes. Coronary artery   calcification.  HEART: Heart size is normal. Aortic valve and mitral annular   calcification. No pericardialeffusion.  MEDIASTINUM AND MARYANNE: No lymphadenopathy.  CHEST WALL AND LOWER NECK: Within normal limits.    ABDOMEN AND PELVIS:  LIVER: Subcentimeter hypodensity in the left lobe that is too small to   characterize.  BILE DUCTS: Normal caliber.  GALLBLADDER: Cholelithiasis.  SPLEEN: Within normal limits.  PANCREAS: Within normal limits.  ADRENALS: Within normal limits.  KIDNEYS/URETERS: Bilateral mild hydroureteronephrosis to the level of the   urinary bladder without evidence of obstructive urolithiasis, not   significantly changed from the prior exam of 2/20/2025. Diffuse   urothelial thickening of the bilateral ureters and collecting systems,   also similar to the previous CT. An exophytic hypodense right renal   lesion measuring 4.1 cm is unchanged.    BLADDER: Underdistended with a suprapubic catheter in place.  REPRODUCTIVE ORGANS: Enlarged prostate. Linear densities in the prostate,   possibly from previous UroLift procedure; correlate with the patient's   surgical history.    BOWEL:Rectal wall thickening. Sigmoid anastomosis. No evidence for bowel   obstruction. Normal appendix.  PERITONEUM/RETROPERITONEUM: Within normal limits.  VESSELS: Atherosclerotic changes. Bilateral common iliac/external iliac   artery stents. Previouslydescribed bilateral external iliac and   bilateral superficial femoral artery occlusions seen on the prior CTA is   not well assessed by this exam.  LYMPH NODES: Small left para-aortic lymph node measuring 1.1 x 0.9 cm,   unchanged.  ABDOMINAL WALL: Suprapubic catheter. Subcutaneous gas and subcutaneous   infiltration tracking through the right medial buttock. No discrete   associated collection within the field-of-view..  BONES: Degenerative changes. Bilateral femoral fixation hardware.    IMPRESSION:    CHEST:  *  Right lower lobe consolidative opacities, which may represent   pneumonia. Additional consolidative opacities in the left lower lobe,   potentially atelectasis or infection. A few ill-defined nodular opacities   in the left lower lobe measuring up to 6 mm may also be   infectious/inflammatory in etiology. Suggest continued attention on   follow-up imaging.  *  Small amount of debris in the trachea.  *  Small left pleural effusion.  *  Linear nonocclusive filling defect within the right internal jugular   vein, which may represent residual fibrin sheath or nonocclusive thrombus.    ABDOMEN AND PELVIS:  *  Subcutaneous gas and infiltration tracking along the medial right   buttock, potentially related to a decubitus wound with gas tracking from   the external environment. A necrotizing airforming infection is also a   possibility. Correlate with physical exam.  *  Rectal wall thickening, suggestive of proctitis.  *  Bilateral mild hydroureteronephrosis to the level of the urinary   bladder without evidence of obstructive urolithiasis, and not   significantly changed since 2/20/2025.  *  Diffuse urothelial thickening of the bilateral renal collecting   systems and ureters, which may be seen with an ascending urinary tract   infection or inflammation, similar to the previous CT 2/20/2025.    Findings were discussed with Dr. NELL TOLBERT 3/25/2025 4:30 PM by   Dr. Calzada.    --- End of Report ---    < end of copied text >    *  Right lower lobe consolidative opacities, which may represent   pneumonia. Additional consolidative opacities in the left lower lobe,   potentially atelectasis or infection. A few ill-defined nodular opacities   in the left lower lobe measuring up to 6 mm may also be   infectious/inflammatory in etiology. Suggest continued attention on   follow-up imaging.  *  Small amount of debris in the trachea.  *  Small left pleural effusion.  *  Linear nonocclusive filling defect within the right internal jugular   vein, which may represent residual fibrin sheath or nonocclusive thrombus.             Date of Service: 03-27-25 @ 12:50    Patient is a 74y old  Male who presents with a chief complaint of ESRD requiring HD, uremia (27 Mar 2025 11:33)      Any change in ROS:       No new respiratory events overnight. Denies SOB/CP.  O2 sats 97% RA     MEDICATIONS  (STANDING):  albuterol/ipratropium for Nebulization 3 milliLiter(s) Nebulizer every 6 hours  aspirin  chewable 81 milliGRAM(s) Oral daily  atorvastatin 40 milliGRAM(s) Oral at bedtime  chlorhexidine 2% Cloths 1 Application(s) Topical daily  clopidogrel Tablet 75 milliGRAM(s) Oral daily  Dakins Solution - 1/4 Strength 1 Application(s) Topical every 8 hours  epoetin aleah-epbx (RETACRIT) Injectable 17748 Unit(s) IV Push <User Schedule>  hydrOXYzine hydrochloride 25 milliGRAM(s) Oral three times a day  ibuprofen  Tablet. 400 milliGRAM(s) Oral every 12 hours  lactobacillus acidophilus 1 Tablet(s) Oral two times a day with meals  meropenem  IVPB 500 milliGRAM(s) IV Intermittent every 12 hours  mupirocin 2% Ointment 1 Application(s) Topical <User Schedule>  oxyCODONE  ER Tablet 30 milliGRAM(s) Oral every 12 hours  pantoprazole    Tablet 40 milliGRAM(s) Oral before breakfast  polyethylene glycol 3350 17 Gram(s) Oral daily  povidone iodine 10% Solution 1 Application(s) Topical two times a day  senna 2 Tablet(s) Oral at bedtime  simethicone 80 milliGRAM(s) Chew every 6 hours    MEDICATIONS  (PRN):  acetaminophen     Tablet .. 650 milliGRAM(s) Oral every 6 hours PRN Temp greater or equal to 38C (100.4F), Mild Pain (1 - 3)  benzocaine/menthol Lozenge 1 Lozenge Oral three times a day PRN Sore Throat  bisacodyl 5 milliGRAM(s) Oral every 12 hours PRN Constipation  HYDROmorphone  Injectable 2 milliGRAM(s) IV Push once PRN Pain  HYDROmorphone  Injectable 2 milliGRAM(s) IV Push every 4 hours PRN Severe Pain (7 - 10)  oxyCODONE    IR 10 milliGRAM(s) Oral every 4 hours PRN Moderate Pain (4 - 6)  sodium chloride 0.9% lock flush 10 milliLiter(s) IV Push every 1 hour PRN Pre/post blood products, medications, blood draw, and to maintain line patency    Vital Signs Last 24 Hrs  T(C): 36.4 (27 Mar 2025 08:40), Max: 36.7 (26 Mar 2025 18:05)  T(F): 97.6 (27 Mar 2025 08:40), Max: 98.1 (26 Mar 2025 18:05)  HR: 82 (27 Mar 2025 08:40) (82 - 95)  BP: 141/63 (27 Mar 2025 08:40) (97/52 - 141/63)  BP(mean): --  RR: 18 (27 Mar 2025 08:40) (18 - 18)  SpO2: 97% (27 Mar 2025 08:40) (91% - 97%)    Parameters below as of 27 Mar 2025 08:40  Patient On (Oxygen Delivery Method): room air        I&O's Summary    26 Mar 2025 07:01  -  27 Mar 2025 07:00  --------------------------------------------------------  IN: 0 mL / OUT: 350 mL / NET: -350 mL          Physical Exam:   GENERAL: NAD, well-groomed, well-developed  HEENT: VINNY/   Atraumatic, Normocephalic  ENMT: No tonsillar erythema, exudates, or enlargement; Moist mucous membranes, Good dentition, No lesions  NECK: Supple, No JVD, Normal thyroid  CHEST/LUNG: RLL crackles   CVS: Regular rate and rhythm; No murmurs, rubs, or gallops  GI: : Soft, Nontender, Nondistended; Bowel sounds present  NERVOUS SYSTEM:  Alert & Oriented X3  EXTREMITIES:  no edema   LYMPH: No lymphadenopathy noted  SKIN: No rashes or lesions  ENDOCRINOLOGY: No Thyromegaly  PSYCH: Appropriate    Labs:                              9.9    10.94 )-----------( 245      ( 24 Mar 2025 07:13 )             33.4     03-24    129[L]  |  93[L]  |  34[H]  ----------------------------<  100[H]  4.6   |  21[L]  |  4.04[H]        CAPILLARY BLOOD GLUCOSE              Studies  < from: CT Chest w/ IV Cont (03.25.25 @ 13:43) >    ACC: [id number] EXAM:  CT CHEST IC   ORDERED BY:  NELL TOLBERT     ACC: [id number] EXAM:  CT ABDOMEN AND PELVIS IC   ORDERED BY:  NELL TOLBERT     PROCEDURE DATE:  03/25/2025          INTERPRETATION:  CLINICAL INFORMATION: Evaluate for pneumonia. Abdominal   pain.    COMPARISON: CT chest 2/23/2025. CT abdomen and pelvis 2/20/2025.    CONTRAST/COMPLICATIONS:  IV Contrast: Omnipaque 350  90 cc administered   10 cc discarded  Oral Contrast: NONE    PROCEDURE:  CT of the Chest, Abdomen and Pelvis was performed.  Sagittal and coronal reformats were performed.    FINDINGS:  CHEST:  LUNGS AND LARGE AIRWAYS: Patent central airways. Small amount of debris   within the trachea. Right lower lobe consolidative opacities. Additional   consolidative opacities in the left lower lobe, possibly atelectasis. A   few ill-defined nodular opacities in the left lower lobe measuring up to   6 mm.  PLEURA: Small left pleural effusion.  VESSELS: Right IJ approach catheter with tip in the superior cavoatrial   junction. Linear nonocclusive filling defect within the right internal   jugular vein (3:1). Atherosclerotic changes. Coronary artery   calcification.  HEART: Heart size is normal. Aortic valve and mitral annular   calcification. No pericardialeffusion.  MEDIASTINUM AND MARYANNE: No lymphadenopathy.  CHEST WALL AND LOWER NECK: Within normal limits.    ABDOMEN AND PELVIS:  LIVER: Subcentimeter hypodensity in the left lobe that is too small to   characterize.  BILE DUCTS: Normal caliber.  GALLBLADDER: Cholelithiasis.  SPLEEN: Within normal limits.  PANCREAS: Within normal limits.  ADRENALS: Within normal limits.  KIDNEYS/URETERS: Bilateral mild hydroureteronephrosis to the level of the   urinary bladder without evidence of obstructive urolithiasis, not   significantly changed from the prior exam of 2/20/2025. Diffuse   urothelial thickening of the bilateral ureters and collecting systems,   also similar to the previous CT. An exophytic hypodense right renal   lesion measuring 4.1 cm is unchanged.    BLADDER: Underdistended with a suprapubic catheter in place.  REPRODUCTIVE ORGANS: Enlarged prostate. Linear densities in the prostate,   possibly from previous UroLift procedure; correlate with the patient's   surgical history.    BOWEL:Rectal wall thickening. Sigmoid anastomosis. No evidence for bowel   obstruction. Normal appendix.  PERITONEUM/RETROPERITONEUM: Within normal limits.  VESSELS: Atherosclerotic changes. Bilateral common iliac/external iliac   artery stents. Previouslydescribed bilateral external iliac and   bilateral superficial femoral artery occlusions seen on the prior CTA is   not well assessed by this exam.  LYMPH NODES: Small left para-aortic lymph node measuring 1.1 x 0.9 cm,   unchanged.  ABDOMINAL WALL: Suprapubic catheter. Subcutaneous gas and subcutaneous   infiltration tracking through the right medial buttock. No discrete   associated collection within the field-of-view..  BONES: Degenerative changes. Bilateral femoral fixation hardware.    IMPRESSION:    CHEST:  *  Right lower lobe consolidative opacities, which may represent   pneumonia. Additional consolidative opacities in the left lower lobe,   potentially atelectasis or infection. A few ill-defined nodular opacities   in the left lower lobe measuring up to 6 mm may also be   infectious/inflammatory in etiology. Suggest continued attention on   follow-up imaging.  *  Small amount of debris in the trachea.  *  Small left pleural effusion.  *  Linear nonocclusive filling defect within the right internal jugular   vein, which may represent residual fibrin sheath or nonocclusive thrombus.    ABDOMEN AND PELVIS:  *  Subcutaneous gas and infiltration tracking along the medial right   buttock, potentially related to a decubitus wound with gas tracking from   the external environment. A necrotizing airforming infection is also a   possibility. Correlate with physical exam.  *  Rectal wall thickening, suggestive of proctitis.  *  Bilateral mild hydroureteronephrosis to the level of the urinary   bladder without evidence of obstructive urolithiasis, and not   significantly changed since 2/20/2025.  *  Diffuse urothelial thickening of the bilateral renal collecting   systems and ureters, which may be seen with an ascending urinary tract   infection or inflammation, similar to the previous CT 2/20/2025.    Findings were discussed with Dr. NELL TOLBERT 3/25/2025 4:30 PM by   Dr. Calzada.    --- End of Report ---    < end of copied text >    *  Right lower lobe consolidative opacities, which may represent   pneumonia. Additional consolidative opacities in the left lower lobe,   potentially atelectasis or infection. A few ill-defined nodular opacities   in the left lower lobe measuring up to 6 mm may also be   infectious/inflammatory in etiology. Suggest continued attention on   follow-up imaging.  *  Small amount of debris in the trachea.  *  Small left pleural effusion.  *  Linear nonocclusive filling defect within the right internal jugular   vein, which may represent residual fibrin sheath or nonocclusive thrombus.

## 2025-03-28 NOTE — PROGRESS NOTE ADULT - SUBJECTIVE AND OBJECTIVE BOX
[facility]  SABINO Griffin Y. Patel, S. Shah, G. Casimir  [phone]  ([phone] - weekdays after 5pm and weekends)    Name: BRIAN DEMPSEY  Age/Gender: 74y Male  MRN: [id number]    Interval History:  Patient seen and examined this morning.   Pain controlled, no new complaints noted.  Notes reviewed  No concerning overnight events  Afebrile. Wife at bedside  Allergies: No Known Allergies      Objective:  Vitals:   T(F): 98.2 (03-28-25 @ 08:34), Max: 98.4 (03-27-25 @ 21:08)  HR: 82 (03-28-25 @ 08:34) (60 - 85)  BP: 135/69 (03-28-25 @ 08:34) (96/62 - 135/69)  RR: 18 (03-28-25 @ 08:34) (18 - 18)  SpO2: 97% (03-28-25 @ 08:34) (96% - 97%)  Physical Examination:  General: no acute distress  HEENT: normocephalic, atraumatic  Respiratory: decreased breath sounds  Cardiovascular: S1S2 present, normal rate  Gastrointestinal: NT, ND, +SPC  Extremities: ischemic changes LE, LE edema  Skin: no visible rash, LE wound dressings     Laboratory Studies:  CBC:                       9.3    8.20  )-----------( 293      ( 28 Mar 2025 10:23 )             31.3     WBC Trend:  8.20 03-28-25 @ 10:23  10.94 03-24-25 @ 07:13    CMP: 03-28    129[L]  |  94[L]  |  30[H]  ----------------------------<  124[H]  4.2   |  24  |  3.21[H]    Ca    7.5[L]      28 Mar 2025 10:24    Creatinine: 3.21 mg/dL (03-28-25 @ 10:24)  Creatinine: 4.04 mg/dL (03-24-25 @ 07:13)    Microbiology: reviewed   Culture - Urine (collected 03-15-25 @ 07:08)  Source: Catheterized Catheterized  Final Report (03-16-25 @ 08:38):    <10,000 CFU/mL Normal Urogenital Catie    03-23-25 @ 14:18 SARS-CoV-2 NotDetec/Influenza A NotDetec/Influenza B NotDetec/RSV NotDetec    Radiology: reviewed     Medications:  acetaminophen     Tablet .. 650 milliGRAM(s) Oral every 6 hours PRN  albuterol/ipratropium for Nebulization 3 milliLiter(s) Nebulizer every 6 hours  aspirin  chewable 81 milliGRAM(s) Oral daily  atorvastatin 40 milliGRAM(s) Oral at bedtime  benzocaine/menthol Lozenge 1 Lozenge Oral three times a day PRN  bisacodyl 5 milliGRAM(s) Oral every 12 hours PRN  chlorhexidine 2% Cloths 1 Application(s) Topical daily  clopidogrel Tablet 75 milliGRAM(s) Oral daily  Dakins Solution - 1/4 Strength 1 Application(s) Topical every 8 hours  epoetin aleah-epbx (RETACRIT) Injectable 93678 Unit(s) IV Push <User Schedule>  heparin   Injectable 5000 Unit(s) SubCutaneous every 8 hours  HYDROmorphone  Injectable 2 milliGRAM(s) IV Push every 4 hours PRN  HYDROmorphone  Injectable 2 milliGRAM(s) IV Push once PRN  hydrOXYzine hydrochloride 25 milliGRAM(s) Oral three times a day  ibuprofen  Tablet. 400 milliGRAM(s) Oral every 12 hours  lactobacillus acidophilus 1 Tablet(s) Oral two times a day with meals  meropenem  IVPB 500 milliGRAM(s) IV Intermittent every 12 hours  mupirocin 2% Ointment 1 Application(s) Topical <User Schedule>  Nephro-margaret 1 Tablet(s) Oral daily  oxyCODONE    IR 10 milliGRAM(s) Oral every 4 hours PRN  oxyCODONE  ER Tablet 30 milliGRAM(s) Oral every 12 hours  pantoprazole    Tablet 40 milliGRAM(s) Oral before breakfast  polyethylene glycol 3350 17 Gram(s) Oral daily  povidone iodine 10% Solution 1 Application(s) Topical two times a day  senna 2 Tablet(s) Oral at bedtime  simethicone 80 milliGRAM(s) Chew every 6 hours  sodium chloride 0.9% lock flush 10 milliLiter(s) IV Push every 1 hour PRN    Current Antimicrobials:  meropenem  IVPB 500 milliGRAM(s) IV Intermittent every 12 hours    Prior/Completed Antimicrobials:  piperacillin/tazobactam IVPB.  piperacillin/tazobactam IVPB.  piperacillin/tazobactam IVPB.-  piperacillin/tazobactam IVPB.-  trimethoprim   80 mG/sulfamethoxazole 400 mG  trimethoprim  160 mG/sulfamethoxazole 800 mG  vancomycin  IVPB  vancomycin  IVPB

## 2025-03-28 NOTE — PROVIDER CONTACT NOTE (CRITICAL VALUE NOTIFICATION) - BACKGROUND
See H & P
pt admitted for SOB, hypoxia, PNA.
Pt was admitted Right  knee pain , s/p fall, CKD,
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

## 2025-03-28 NOTE — PROGRESS NOTE ADULT - SUBJECTIVE AND OBJECTIVE BOX
NEPHROLOGY     Patient seen and examined on HD, reports feeling better, mild le pain though controlled with medications, no new complaints, in no acute distress.     MEDICATIONS  (STANDING):  albuterol/ipratropium for Nebulization 3 milliLiter(s) Nebulizer every 6 hours  aspirin  chewable 81 milliGRAM(s) Oral daily  atorvastatin 40 milliGRAM(s) Oral at bedtime  chlorhexidine 2% Cloths 1 Application(s) Topical daily  clopidogrel Tablet 75 milliGRAM(s) Oral daily  Dakins Solution - 1/4 Strength 1 Application(s) Topical every 8 hours  epoetin aleah-epbx (RETACRIT) Injectable 95735 Unit(s) IV Push <User Schedule>  heparin   Injectable 5000 Unit(s) SubCutaneous every 8 hours  hydrOXYzine hydrochloride 25 milliGRAM(s) Oral three times a day  ibuprofen  Tablet. 400 milliGRAM(s) Oral every 12 hours  lactobacillus acidophilus 1 Tablet(s) Oral two times a day with meals  meropenem  IVPB 500 milliGRAM(s) IV Intermittent every 12 hours  mupirocin 2% Ointment 1 Application(s) Topical <User Schedule>  Nephro-margaret 1 Tablet(s) Oral daily  oxyCODONE  ER Tablet 30 milliGRAM(s) Oral every 12 hours  pantoprazole    Tablet 40 milliGRAM(s) Oral before breakfast  polyethylene glycol 3350 17 Gram(s) Oral daily  povidone iodine 10% Solution 1 Application(s) Topical two times a day  senna 2 Tablet(s) Oral at bedtime  simethicone 80 milliGRAM(s) Chew every 6 hours    VITALS:  T(C): , Max: 36.9 (03-27-25 @ 21:08)  T(F): , Max: 98.4 (03-27-25 @ 21:08)  HR: 85 (03-28-25 @ 11:14)  BP: 142/63 (03-28-25 @ 11:14)  RR: 18 (03-28-25 @ 11:14)  SpO2: 96% (03-28-25 @ 11:14)    I and O's:    03-27 @ 07:01  -  03-28 @ 07:00  --------------------------------------------------------  IN: 0 mL / OUT: 425 mL / NET: -425 mL    PHYSICAL EXAM:  Constitutional: alert, NAD  HEENT: NCAT, DMM  Neck: Supple, No JVD  Respiratory: CTA-b/l  Cardiovascular: RRR s1s2, no m/r/g  Gastrointestinal: BS+, soft, NT/ND  : (+)suprapubic cath  Extremities: 1+ b/l LE edema  Neurological: reduced generalized strength  Back: no CVAT b/l  Skin: (+)cyanotic changes b/l feet  Access: [facility] tunneled cath (accessed)     LABS:                        9.3    8.20  )-----------( 293      ( 28 Mar 2025 10:23 )             31.3     03-28    129[L]  |  94[L]  |  30[H]  ----------------------------<  124[H]  4.2   |  24  |  3.21[H]    Ca    7.5[L]      28 Mar 2025 10:24

## 2025-03-28 NOTE — PROGRESS NOTE ADULT - SUBJECTIVE AND OBJECTIVE BOX
Patient is a 74y old  Male who presents with a chief complaint of ESRD requiring HD, uremia (28 Mar 2025 12:33)      SUBJECTIVE / OVERNIGHT EVENTS: pain from decubitus ulcer and Left knee post debridement continues. wound care recs in place. on Meropenem for PNA, pulm and ID following. HD as per renal. HDS    MEDICATIONS  (STANDING):  albuterol/ipratropium for Nebulization 3 milliLiter(s) Nebulizer every 6 hours  aspirin  chewable 81 milliGRAM(s) Oral daily  atorvastatin 40 milliGRAM(s) Oral at bedtime  chlorhexidine 2% Cloths 1 Application(s) Topical daily  clopidogrel Tablet 75 milliGRAM(s) Oral daily  Dakins Solution - 1/4 Strength 1 Application(s) Topical every 8 hours  epoetin aleah-epbx (RETACRIT) Injectable 03622 Unit(s) IV Push <User Schedule>  heparin   Injectable 5000 Unit(s) SubCutaneous every 8 hours  hydrOXYzine hydrochloride 25 milliGRAM(s) Oral three times a day  ibuprofen  Tablet. 400 milliGRAM(s) Oral every 12 hours  lactobacillus acidophilus 1 Tablet(s) Oral two times a day with meals  meropenem  IVPB 500 milliGRAM(s) IV Intermittent every 12 hours  mupirocin 2% Ointment 1 Application(s) Topical <User Schedule>  Nephro-margaret 1 Tablet(s) Oral daily  oxyCODONE  ER Tablet 30 milliGRAM(s) Oral every 12 hours  pantoprazole    Tablet 40 milliGRAM(s) Oral before breakfast  polyethylene glycol 3350 17 Gram(s) Oral daily  povidone iodine 10% Solution 1 Application(s) Topical two times a day  senna 2 Tablet(s) Oral at bedtime  simethicone 80 milliGRAM(s) Chew every 6 hours    MEDICATIONS  (PRN):  acetaminophen     Tablet .. 650 milliGRAM(s) Oral every 6 hours PRN Temp greater or equal to 38C (100.4F), Mild Pain (1 - 3)  benzocaine/menthol Lozenge 1 Lozenge Oral three times a day PRN Sore Throat  bisacodyl 5 milliGRAM(s) Oral every 12 hours PRN Constipation  HYDROmorphone  Injectable 2 milliGRAM(s) IV Push every 4 hours PRN Severe Pain (7 - 10)  HYDROmorphone  Injectable 2 milliGRAM(s) IV Push once PRN Pain  oxyCODONE    IR 10 milliGRAM(s) Oral every 4 hours PRN Moderate Pain (4 - 6)  sodium chloride 0.9% lock flush 10 milliLiter(s) IV Push every 1 hour PRN Pre/post blood products, medications, blood draw, and to maintain line patency      Vital Signs Last 24 Hrs  T(F): 97.5 (03-28-25 @ 11:14), Max: 98.4 (03-27-25 @ 21:08)  HR: 85 (03-28-25 @ 11:14) (60 - 85)  BP: 142/63 (03-28-25 @ 11:14) (96/62 - 142/63)  RR: 18 (03-28-25 @ 11:14) (18 - 18)  SpO2: 96% (03-28-25 @ 11:14) (96% - 97%)  Telemetry:   CAPILLARY BLOOD GLUCOSE        I&O's Summary    27 Mar 2025 07:01  -  28 Mar 2025 07:00  --------------------------------------------------------  IN: 0 mL / OUT: 425 mL / NET: -425 mL        PHYSICAL EXAM:  GENERAL: NAD, well-developed  HEAD:  Atraumatic, Normocephalic  EYES: EOMI, PERRLA, conjunctiva and sclera clear  NECK: Supple, No JVD  CHEST/LUNG: Clear to auscultation bilaterally; No wheeze  HEART: Regular rate and rhythm; No murmurs, rubs, or gallops  ABDOMEN: Soft, Nontender, Nondistended; Bowel sounds present  EXTREMITIES:  2+ Peripheral Pulses, No clubbing, cyanosis, or edema  PSYCH: AAOx3  NEUROLOGY: non-focal  SKIN: No rashes or lesions    LABS:                        9.3    8.20  )-----------( 293      ( 28 Mar 2025 10:23 )             31.3     03-28    129[L]  |  94[L]  |  30[H]  ----------------------------<  124[H]  4.2   |  24  |  3.21[H]    Ca    7.5[L]      28 Mar 2025 10:24            Urinalysis Basic - ( 28 Mar 2025 10:24 )    Color: x / Appearance: x / SG: x / pH: x  Gluc: 124 mg/dL / Ketone: x  / Bili: x / Urobili: x   Blood: x / Protein: x / Nitrite: x   Leuk Esterase: x / RBC: x / WBC x   Sq Epi: x / Non Sq Epi: x / Bacteria: x        RADIOLOGY & ADDITIONAL TESTS:    Imaging Personally Reviewed:    Consultant(s) Notes Reviewed:      Care Discussed with Consultants/Other Providers:

## 2025-03-28 NOTE — PROGRESS NOTE ADULT - SUBJECTIVE AND OBJECTIVE BOX
INTERVAL HPI/OVERNIGHT EVENTS:    reports no abdominal discomfort this morning; no n/v  (+) productive bm's     MEDICATIONS  (STANDING):  albuterol/ipratropium for Nebulization 3 milliLiter(s) Nebulizer every 6 hours  aspirin  chewable 81 milliGRAM(s) Oral daily  atorvastatin 40 milliGRAM(s) Oral at bedtime  chlorhexidine 2% Cloths 1 Application(s) Topical daily  clopidogrel Tablet 75 milliGRAM(s) Oral daily  Dakins Solution - 1/4 Strength 1 Application(s) Topical every 8 hours  epoetin aleah-epbx (RETACRIT) Injectable 14643 Unit(s) IV Push <User Schedule>  hydrOXYzine hydrochloride 25 milliGRAM(s) Oral three times a day  ibuprofen  Tablet. 400 milliGRAM(s) Oral every 12 hours  lactulose Syrup 20 Gram(s) Oral once  meropenem  IVPB 500 milliGRAM(s) IV Intermittent every 12 hours  mineral oil enema 133 milliLiter(s) Rectal once  mupirocin 2% Ointment 1 Application(s) Topical <User Schedule>  oxyCODONE  ER Tablet 30 milliGRAM(s) Oral every 12 hours  pantoprazole    Tablet 40 milliGRAM(s) Oral before breakfast  polyethylene glycol 3350 17 Gram(s) Oral daily  povidone iodine 10% Solution 1 Application(s) Topical two times a day  senna 2 Tablet(s) Oral at bedtime  simethicone 80 milliGRAM(s) Chew every 6 hours    MEDICATIONS  (PRN):  acetaminophen     Tablet .. 650 milliGRAM(s) Oral every 6 hours PRN Temp greater or equal to 38C (100.4F), Mild Pain (1 - 3)  benzocaine/menthol Lozenge 1 Lozenge Oral three times a day PRN Sore Throat  bisacodyl 5 milliGRAM(s) Oral every 12 hours PRN Constipation  HYDROmorphone  Injectable 2 milliGRAM(s) IV Push every 4 hours PRN Severe Pain (7 - 10)  HYDROmorphone  Injectable 2 milliGRAM(s) IV Push once PRN Pain  oxyCODONE    IR 10 milliGRAM(s) Oral every 4 hours PRN Moderate Pain (4 - 6)  sodium chloride 0.9% lock flush 10 milliLiter(s) IV Push every 1 hour PRN Pre/post blood products, medications, blood draw, and to maintain line patency      Allergies    No Known Allergies    Intolerances        Review of Systems:    General:  No wt loss, fevers, chills, night sweats, fatigue   Eyes:  Good vision, no reported pain  ENT:  No sore throat, pain, runny nose, dysphagia  CV:  No pain, palpitations, hypo/hypertension  Resp:  No dyspnea, cough, tachypnea, wheezing  GI:  No pain, No nausea, No vomiting, No diarrhea, No constipation, No weight loss, No fever, No pruritis, No rectal bleeding, No melena, No dysphagia  :  No pain, bleeding, incontinence, nocturia  Muscle:  No pain, weakness  Neuro:  No weakness, tingling, memory problems  Psych:  No fatigue, insomnia, mood problems, depression  Endocrine:  No polyuria, polydypsia, cold/heat intolerance  Heme:  No petechiae, ecchymosis, easy bruisability  Skin:  No rash, tattoos, scars, edema      Vital Signs Last 24 Hrs  T(C): 36.4 (27 Mar 2025 08:40), Max: 36.7 (26 Mar 2025 18:05)  T(F): 97.6 (27 Mar 2025 08:40), Max: 98.1 (26 Mar 2025 18:05)  HR: 82 (27 Mar 2025 08:40) (82 - 95)  BP: 141/63 (27 Mar 2025 08:40) (97/52 - 141/63)  BP(mean): --  RR: 18 (27 Mar 2025 08:40) (18 - 18)  SpO2: 97% (27 Mar 2025 08:40) (91% - 97%)    Parameters below as of 27 Mar 2025 08:40  Patient On (Oxygen Delivery Method): room air        PHYSICAL EXAM:    Constitutional: NAD  HEENT: EOMI, throat clear  Neck: No LAD, supple  Respiratory: CTA and P  Cardiovascular: S1 and S2, RRR, no M  Gastrointestinal: BS+, soft, NT/ND, neg HSM,  Extremities: No peripheral edema, neg clubbing, cyanosis  Vascular: 2+ peripheral pulses  Neurological: A/O   Psychiatric: Normal mood, normal affect  Skin: No rashes      LABS:                RADIOLOGY & ADDITIONAL TESTS:

## 2025-03-28 NOTE — PROGRESS NOTE ADULT - ASSESSMENT
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  came in after fall and for HD.     2/20 CTH neg   \Na 123 --> now 133   A1c 5.7   TSH WNL 3.46   o/e 2/21 AAOx2, uppers 4/5, lowers limited .  2/23; family bedside upset that he has pain in leg after he was moved.  patient going for imaging now.   s/p R IJ permacath by IR 2/25 2/27 AAOx3   sopoke with family bedisde 3/17 wife says mental status okay, sometimes up and down but good   3/18 was told he has "new neuropathy" spoke iwth patient and wife at bedside. states its the same from before not a new issue.    3/21 mental status stable. c/o pubic cathter   3/23 c/o nausea today   no new changes     Imrpssion  1) AMS, waxing and waing , likely multifactorial from infection, metabolic/electrolyte derrangements/ delerium / medication effect , ureemia which is imporving   2) polio  3) fall 2/2 weakness  4) hyponatremia to 123 2/20  improved 133 -->136;   back down to 129     - still on meropenum IV; if dischare planning need a plan ; f/u ID   - patient wants home f/u PT   - Na 129 on most recent blood work.  slow correcttion  ; f/u renal    - AVF outpatient l; f/u vascular     f/u vascular; no vascluar options ; f/u with Messi from vasculare to schedule outpatient procedure   - on DAPT   - was getting oxycodone ER 30mg BID and oxy PRN IR i10 and dilauded PRN ;   consider gabapentin 200mg TID or lyrica 50mg BID fo nueorpathy if no objection ; patient states he still has pain but its better.  vascular has seen patient.  poor prognosis of LLE   - f/u psych   - limit sedating meds   - continue to monitor and correct metabolic derrangements .  - delerium precuations.   - frequent re orientation  - check b12, RPR, ammonia level if never checked    - PT/OT   - check FS, glucose control <180  - GI/DVT ppx  - Thank you for allowing me to participate in the care of this patient. Call with questions.   dc planning    spoke with wife at [facility] 3/28      Paul Limon MD  Vascular Neurology  Office: [phone]

## 2025-03-28 NOTE — PROVIDER CONTACT NOTE (CRITICAL VALUE NOTIFICATION) - ACTION/TREATMENT ORDERED:
Provider made aware.
no active order at this time.
awaiting orders
Provider made aware
Medicine PA made aware/NNO at this time/Monitor/PP/RN

## 2025-03-28 NOTE — PROGRESS NOTE ADULT - ASSESSMENT
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 The initial plan was that he would present to the [facility] ER Monday 2/17 for HD initiation. However, day of admission, he fell and sustained trauma to his knee. Called to consult for cough, elevated R hemidiaphragm.

## 2025-03-28 NOTE — PROGRESS NOTE ADULT - ASSESSMENT
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 Last dialyzed Wednesday, on HD now     (2)Hyponatremia - receiving high-Na+ bath with HD    (3)Anemia - s/p IV iron; on Retacrit with HD    (4)PAD - s/p LE bypass 3/3/25 - cyanotic changes of toes b/l - for further management after discharge    (5)CV - multifactorial LE edema; improving with increasing intradialytic UF, and with improving nutritional parameters    (6)Neuro - reduced generalized strength - getting PT/awaiting NYDIA    RECOMMEND:   (1)HD now - 1.5L UF as able    Beryl Darden DNP  [facility]  [phone]

## 2025-03-28 NOTE — PROGRESS NOTE ADULT - SUBJECTIVE AND OBJECTIVE BOX
Neurology      S; patient seen. no neuro changes; wife at bedside       Medications: MEDICATIONS  (STANDING):  albuterol/ipratropium for Nebulization 3 milliLiter(s) Nebulizer every 6 hours  aspirin  chewable 81 milliGRAM(s) Oral daily  atorvastatin 40 milliGRAM(s) Oral at bedtime  chlorhexidine 2% Cloths 1 Application(s) Topical daily  clopidogrel Tablet 75 milliGRAM(s) Oral daily  Dakins Solution - 1/4 Strength 1 Application(s) Topical every 8 hours  epoetin aleah-epbx (RETACRIT) Injectable 23269 Unit(s) IV Push <User Schedule>  heparin   Injectable 5000 Unit(s) SubCutaneous every 8 hours  hydrOXYzine hydrochloride 25 milliGRAM(s) Oral three times a day  ibuprofen  Tablet. 400 milliGRAM(s) Oral every 12 hours  lactobacillus acidophilus 1 Tablet(s) Oral two times a day with meals  meropenem  IVPB 500 milliGRAM(s) IV Intermittent every 12 hours  mupirocin 2% Ointment 1 Application(s) Topical <User Schedule>  Nephro-margaret 1 Tablet(s) Oral daily  oxyCODONE  ER Tablet 30 milliGRAM(s) Oral every 12 hours  pantoprazole    Tablet 40 milliGRAM(s) Oral before breakfast  polyethylene glycol 3350 17 Gram(s) Oral daily  povidone iodine 10% Solution 1 Application(s) Topical two times a day  senna 2 Tablet(s) Oral at bedtime  simethicone 80 milliGRAM(s) Chew every 6 hours    MEDICATIONS  (PRN):  acetaminophen     Tablet .. 650 milliGRAM(s) Oral every 6 hours PRN Temp greater or equal to 38C (100.4F), Mild Pain (1 - 3)  benzocaine/menthol Lozenge 1 Lozenge Oral three times a day PRN Sore Throat  bisacodyl 5 milliGRAM(s) Oral every 12 hours PRN Constipation  HYDROmorphone  Injectable 2 milliGRAM(s) IV Push every 4 hours PRN Severe Pain (7 - 10)  HYDROmorphone  Injectable 2 milliGRAM(s) IV Push once PRN Pain  oxyCODONE    IR 10 milliGRAM(s) Oral every 4 hours PRN Moderate Pain (4 - 6)  sodium chloride 0.9% lock flush 10 milliLiter(s) IV Push every 1 hour PRN Pre/post blood products, medications, blood draw, and to maintain line patency       Vitals:  Vital Signs Last 24 Hrs  T(C): 36.8 (28 Mar 2025 08:34), Max: 36.9 (27 Mar 2025 21:08)  T(F): 98.2 (28 Mar 2025 08:34), Max: 98.4 (27 Mar 2025 21:08)  HR: 82 (28 Mar 2025 08:34) (60 - 85)  BP: 135/69 (28 Mar 2025 08:34) (96/62 - 135/69)  BP(mean): --  RR: 18 (28 Mar 2025 08:34) (18 - 18)  SpO2: 97% (28 Mar 2025 08:34) (96% - 97%)    Parameters below as of 28 Mar 2025 08:34  Patient On (Oxygen Delivery Method): room air              General Appearance: Appropriately dressed and in no acute distress       Head: Normocephalic, atraumatic and no dysmorphic features  Ear, Nose, and Throat: Moist mucous membranes  CVS: S1S2+  Resp: No SOB, no wheeze or rhonchi  GI: soft NT/ND  Extremities: LE PVD : dusky toes noted bilaterally L>R   Skin: + decub      Neurological Exam:  Mental Status: Awake, alert and oriented x 2.  Able to follow simple and complex verbal commands. Able to name and repeat. fluent speech. No obvious aphasia or dysarthria noted.   Cranial Nerves: PERRL, EOMI, VFFC, sensation V1-V3 intact,  no obvious facial asymmetry, equal elevation of palate, scm/trap 5/5, tongue is midline on protrusion. no obvious papilledema on fundoscopic exam. hearing is grossly intact.   Motor: Normal bulk, tone and strength throughout uppers 4/ lowers 0-/1 5   Sensation: Intact to light touch and pinprick throughout.     Coordination: No dysmetria on FNF   Gait: deferred, wheelchair bound     Data/Labs/Imaging which I personally reviewed.      LABS:      LABS:                          9.3    8.20  )-----------( 293      ( 28 Mar 2025 10:23 )             31.3     03-28    129[L]  |  94[L]  |  30[H]  ----------------------------<  124[H]  4.2   |  24  |  3.21[H]    Ca    7.5[L]      28 Mar 2025 10:24          Urinalysis Basic - ( 28 Mar 2025 10:24 )    Color: x / Appearance: x / SG: x / pH: x  Gluc: 124 mg/dL / Ketone: x  / Bili: x / Urobili: x   Blood: x / Protein: x / Nitrite: x   Leuk Esterase: x / RBC: x / WBC x   Sq Epi: x / Non Sq Epi: x / Bacteria: x          < from: CT Head No Cont (02.20.25 @ 18:47) >    ACC: [id number] EXAM:  CT BRAIN   ORDERED BY: GUY DE LA CRUZ     PROCEDURE DATE:  02/20/2025          INTERPRETATION:  CLINICAL INDICATION: Altered mental status    TECHNIQUE: Axial CT scanning of the brain was obtained from the skull   base to the vertex without the administration of intravenous contrast.   Reformatted coronal and sagittal images were subsequently obtained and   reviewed.    COMPARISON: None    FINDINGS:  There is no CT evidence of acute transcortical infarct. Age-related   involutional changes and chronic microvascular ischemic changes.    There is no hydrocephalus, mass effect, or acute intracranial hemorrhage.   No extra-axial collection. Basal cisterns are patent.    The visualized paranasal sinuses and mastoid air cells are clear.    The calvarium is intact.    IMPRESSION:  No evidence of acute transcortical infarct, acute intracranial   hemorrhage, or mass effect.    --- End of Report ---            CORTNEY REARDON MD; Attending Radiologist  This document has been electronically signed. Feb 20 2025  7:07PM    < end of copied text >

## 2025-03-28 NOTE — PROGRESS NOTE ADULT - SUBJECTIVE AND OBJECTIVE BOX
Subjective: Patient seen and examined. No new events except as noted.     REVIEW OF SYSTEMS:    CONSTITUTIONAL: N+ weakness, fevers or chills  EYES/ENT: No visual changes;  No vertigo or throat pain   NECK: No pain or stiffness  RESPIRATORY: No cough, wheezing, hemoptysis; No shortness of breath  CARDIOVASCULAR: No chest pain or palpitations  GASTROINTESTINAL: No abdominal or epigastric pain. No nausea, vomiting, or hematemesis; No diarrhea or constipation. No melena or hematochezia.  GENITOURINARY: No dysuria, frequency or hematuria  NEUROLOGICAL: No numbness or weakness  SKIN: No itching, burning, rashes, or lesions   All other review of systems is negative unless indicated above.    MEDICATIONS:  MEDICATIONS  (STANDING):  albuterol/ipratropium for Nebulization 3 milliLiter(s) Nebulizer every 6 hours  aspirin  chewable 81 milliGRAM(s) Oral daily  atorvastatin 40 milliGRAM(s) Oral at bedtime  chlorhexidine 2% Cloths 1 Application(s) Topical daily  clopidogrel Tablet 75 milliGRAM(s) Oral daily  Dakins Solution - 1/4 Strength 1 Application(s) Topical every 8 hours  epoetin aleah-epbx (RETACRIT) Injectable 61910 Unit(s) IV Push <User Schedule>  heparin   Injectable 5000 Unit(s) SubCutaneous every 8 hours  hydrOXYzine hydrochloride 25 milliGRAM(s) Oral three times a day  ibuprofen  Tablet. 400 milliGRAM(s) Oral every 12 hours  lactobacillus acidophilus 1 Tablet(s) Oral two times a day with meals  meropenem  IVPB 500 milliGRAM(s) IV Intermittent every 12 hours  mupirocin 2% Ointment 1 Application(s) Topical <User Schedule>  Nephro-margaret 1 Tablet(s) Oral daily  oxyCODONE  ER Tablet 30 milliGRAM(s) Oral every 12 hours  pantoprazole    Tablet 40 milliGRAM(s) Oral before breakfast  polyethylene glycol 3350 17 Gram(s) Oral daily  povidone iodine 10% Solution 1 Application(s) Topical two times a day  senna 2 Tablet(s) Oral at bedtime  simethicone 80 milliGRAM(s) Chew every 6 hours      PHYSICAL EXAM:  T(C): 36.8 (03-28-25 @ 08:34), Max: 36.9 (03-27-25 @ 21:08)  HR: 82 (03-28-25 @ 08:34) (60 - 85)  BP: 135/69 (03-28-25 @ 08:34) (96/62 - 135/69)  RR: 18 (03-28-25 @ 08:34) (18 - 18)  SpO2: 97% (03-28-25 @ 08:34) (96% - 97%)  Wt(kg): --  I&O's Summary    27 Mar 2025 07:01  -  28 Mar 2025 07:00  --------------------------------------------------------  IN: 0 mL / OUT: 425 mL / NET: -425 mL                Appearance: NAD  HEENT:  Dry oral mucosa, PERRL, EOMI	  Lymphatic: No lymphadenopathy  Cardiovascular: Normal S1 S2, No JVD, No murmurs, No edema  Respiratory: Lungs clear to auscultation	  Psychiatry: A & O x 3, Mood & affect appropriate  Skin: No rashes, No ecchymoses, No cyanosis	  Neurologic: Non-focal  GI/Abd: Soft, obese, nontender. Dressing to Q C/D/I. Suprapubic catheter in place  Ext: Palp femoral pulses bilat. BLE atrophic, minimal dorsi/plantarflexion bilaterally. Sensation grossly intact. LLE edematous compared to R, erythema to right toes/forefoot. mottling of right toes/forefoot/heel  LLE:  small superficial abrasion, +edema and +ecchymosis over L knee  +TTP over L knee, no TTP along remainder of extremity; compartments soft  Limited ROM at knee 2/2 pain  No gross varus/valgus laxity, but assessment limited 2/2 pain  Motor: TA/EHL/GS/FHL intact  Sensory: DP/SP/Tib/Jordan/Saph SILT  +DP pulse (symmetric relative to contralateral side), WWP   pressure wound from the LLE immobilizer posteriorly proximal to the knee.  Vascular: Peripheral pulses palpable 2+ bilaterally        LABS:    CARDIAC MARKERS:                  proBNP:   Lipid Profile:   HgA1c:   TSH:             TELEMETRY: 	    ECG:  	  RADIOLOGY:   DIAGNOSTIC TESTING:  [ ] Echocardiogram:  [ ]  Catheterization:  [ ] Stress Test:    OTHER:

## 2025-03-28 NOTE — PROGRESS NOTE ADULT - SUBJECTIVE AND OBJECTIVE BOX
Date of Service: 03-28-25 @ 15:12    Patient is a 74y old  Male who presents with a chief complaint of ESRD requiring HD, uremia (28 Mar 2025 14:24)      Any change in ROS:   No new respiratory events overnight. Denies SOB/CP.      MEDICATIONS  (STANDING):  albuterol/ipratropium for Nebulization 3 milliLiter(s) Nebulizer every 6 hours  aspirin  chewable 81 milliGRAM(s) Oral daily  atorvastatin 40 milliGRAM(s) Oral at bedtime  chlorhexidine 2% Cloths 1 Application(s) Topical daily  clopidogrel Tablet 75 milliGRAM(s) Oral daily  Dakins Solution - 1/4 Strength 1 Application(s) Topical every 8 hours  epoetin aleah-epbx (RETACRIT) Injectable 20547 Unit(s) IV Push <User Schedule>  heparin   Injectable 5000 Unit(s) SubCutaneous every 8 hours  hydrOXYzine hydrochloride 25 milliGRAM(s) Oral three times a day  ibuprofen  Tablet. 400 milliGRAM(s) Oral every 12 hours  lactobacillus acidophilus 1 Tablet(s) Oral two times a day with meals  meropenem  IVPB 500 milliGRAM(s) IV Intermittent every 12 hours  mupirocin 2% Ointment 1 Application(s) Topical <User Schedule>  Nephro-margaret 1 Tablet(s) Oral daily  oxyCODONE  ER Tablet 30 milliGRAM(s) Oral every 12 hours  pantoprazole    Tablet 40 milliGRAM(s) Oral before breakfast  polyethylene glycol 3350 17 Gram(s) Oral daily  povidone iodine 10% Solution 1 Application(s) Topical two times a day  senna 2 Tablet(s) Oral at bedtime  simethicone 80 milliGRAM(s) Chew every 6 hours    MEDICATIONS  (PRN):  acetaminophen     Tablet .. 650 milliGRAM(s) Oral every 6 hours PRN Temp greater or equal to 38C (100.4F), Mild Pain (1 - 3)  benzocaine/menthol Lozenge 1 Lozenge Oral three times a day PRN Sore Throat  bisacodyl 5 milliGRAM(s) Oral every 12 hours PRN Constipation  HYDROmorphone  Injectable 2 milliGRAM(s) IV Push every 4 hours PRN Severe Pain (7 - 10)  HYDROmorphone  Injectable 2 milliGRAM(s) IV Push once PRN Pain  oxyCODONE    IR 10 milliGRAM(s) Oral every 4 hours PRN Moderate Pain (4 - 6)  sodium chloride 0.9% lock flush 10 milliLiter(s) IV Push every 1 hour PRN Pre/post blood products, medications, blood draw, and to maintain line patency    Vital Signs Last 24 Hrs  T(C): 36.4 (28 Mar 2025 11:14), Max: 36.9 (27 Mar 2025 21:08)  T(F): 97.5 (28 Mar 2025 11:14), Max: 98.4 (27 Mar 2025 21:08)  HR: 85 (28 Mar 2025 11:14) (60 - 85)  BP: 142/63 (28 Mar 2025 11:14) (96/62 - 142/63)  BP(mean): --  RR: 18 (28 Mar 2025 11:14) (18 - 18)  SpO2: 96% (28 Mar 2025 11:14) (96% - 97%)    Parameters below as of 28 Mar 2025 11:14  Patient On (Oxygen Delivery Method): room air        I&O's Summary    27 Mar 2025 07:01  -  28 Mar 2025 07:00  --------------------------------------------------------  IN: 0 mL / OUT: 425 mL / NET: -425 mL          Physical Exam:   GENERAL: NAD, well-groomed, well-developed  HEENT: VINNY/   Atraumatic, Normocephalic  ENMT: No tonsillar erythema, exudates, or enlargement; Moist mucous membranes, Good dentition, No lesions  NECK: Supple, No JVD, Normal thyroid  CHEST/LUNG: Crackles RLL  CVS: Regular rate and rhythm; No murmurs, rubs, or gallops  GI: : Soft, Nontender, Nondistended; Bowel sounds present  NERVOUS SYSTEM:  Alert & Oriented X3, Good concentration; Motor Strength 5/5 B/L upper and lower extremities; DTRs 2+ intact and symmetric  EXTREMITIES:  No edema   LYMPH: No lymphadenopathy noted  SKIN: No rashes or lesions  ENDOCRINOLOGY: No Thyromegaly  PSYCH: Appropriate    Labs:                              9.3    8.20  )-----------( 293      ( 28 Mar 2025 10:23 )             31.3     03-28    129[L]  |  94[L]  |  30[H]  ----------------------------<  124[H]  4.2   |  24  |  3.21[H]    Ca    7.5[L]      28 Mar 2025 10:24      CAPILLARY BLOOD GLUCOSE              Urinalysis Basic - ( 28 Mar 2025 10:24 )    Color: x / Appearance: x / SG: x / pH: x  Gluc: 124 mg/dL / Ketone: x  / Bili: x / Urobili: x   Blood: x / Protein: x / Nitrite: x   Leuk Esterase: x / RBC: x / WBC x   Sq Epi: x / Non Sq Epi: x / Bacteria: x            RECENT CULTURES:        RESPIRATORY CULTURES:          Studies  < from: CT Chest w/ IV Cont (03.25.25 @ 13:43) >    ACC: [id number] EXAM:  CT CHEST IC   ORDERED BY:  NELL TOLBERT     ACC: [id number] EXAM:  CT ABDOMEN AND PELVIS IC   ORDERED BY:  NELL TOLBERT     PROCEDURE DATE:  03/25/2025          INTERPRETATION:  CLINICAL INFORMATION: Evaluate for pneumonia. Abdominal   pain.    COMPARISON: CT chest 2/23/2025. CT abdomen and pelvis 2/20/2025.    CONTRAST/COMPLICATIONS:  IV Contrast: Omnipaque 350  90 cc administered   10 cc discarded  Oral Contrast: NONE    PROCEDURE:  CT of the Chest, Abdomen and Pelvis was performed.  Sagittal and coronal reformats were performed.    FINDINGS:  CHEST:  LUNGS AND LARGE AIRWAYS: Patent central airways. Small amount of debris   within the trachea. Right lower lobe consolidative opacities. Additional   consolidative opacities in the left lower lobe, possibly atelectasis. A   few ill-defined nodular opacities in the left lower lobe measuring up to   6 mm.  PLEURA: Small left pleural effusion.  VESSELS: Right IJ approach catheter with tip in the superior cavoatrial   junction. Linear nonocclusive filling defect within the right internal   jugular vein (3:1). Atherosclerotic changes. Coronary artery   calcification.  HEART: Heart size is normal. Aortic valve and mitral annular   calcification. No pericardialeffusion.  MEDIASTINUM AND MARYANNE: No lymphadenopathy.  CHEST WALL AND LOWER NECK: Within normal limits.    ABDOMEN AND PELVIS:  LIVER: Subcentimeter hypodensity in the left lobe that is too small to   characterize.  BILE DUCTS: Normal caliber.  GALLBLADDER: Cholelithiasis.  SPLEEN: Within normal limits.  PANCREAS: Within normal limits.  ADRENALS: Within normal limits.  KIDNEYS/URETERS: Bilateral mild hydroureteronephrosis to the level of the   urinary bladder without evidence of obstructive urolithiasis, not   significantly changed from the prior exam of 2/20/2025. Diffuse   urothelial thickening of the bilateral ureters and collecting systems,   also similar to the previous CT. An exophytic hypodense right renal   lesion measuring 4.1 cm is unchanged.    BLADDER: Underdistended with a suprapubic catheter in place.  REPRODUCTIVE ORGANS: Enlarged prostate. Linear densities in the prostate,   possibly from previous UroLift procedure; correlate with the patient's   surgical history.    BOWEL:Rectal wall thickening. Sigmoid anastomosis. No evidence for bowel   obstruction. Normal appendix.  PERITONEUM/RETROPERITONEUM: Within normal limits.  VESSELS: Atherosclerotic changes. Bilateral common iliac/external iliac   artery stents. Previouslydescribed bilateral external iliac and   bilateral superficial femoral artery occlusions seen on the prior CTA is   not well assessed by this exam.  LYMPH NODES: Small left para-aortic lymph node measuring 1.1 x 0.9 cm,   unchanged.  ABDOMINAL WALL: Suprapubic catheter. Subcutaneous gas and subcutaneous   infiltration tracking through the right medial buttock. No discrete   associated collection within the field-of-view..  BONES: Degenerative changes. Bilateral femoral fixation hardware.    IMPRESSION:    CHEST:  *  Right lower lobe consolidative opacities, which may represent   pneumonia. Additional consolidative opacities in the left lower lobe,   potentially atelectasis or infection. A few ill-defined nodular opacities   in the left lower lobe measuring up to 6 mm may also be   infectious/inflammatory in etiology. Suggest continued attention on   follow-up imaging.  *  Small amount of debris in the trachea.  *  Small left pleural effusion.  *  Linear nonocclusive filling defect within the right internal jugular   vein, which may represent residual fibrin sheath or nonocclusive thrombus.    ABDOMEN AND PELVIS:  *  Subcutaneous gas and infiltration tracking along the medial right   buttock, potentially related to a decubitus wound with gas tracking from   the external environment. A necrotizing airforming infection is also a   possibility. Correlate with physical exam.  *  Rectal wall thickening, suggestive of proctitis.  *  Bilateral mild hydroureteronephrosis to the level of the urinary   bladder without evidence of obstructive urolithiasis, and not   significantly changed since 2/20/2025.  *  Diffuse urothelial thickening of the bilateral renal collecting   systems and ureters, which may be seen with an ascending urinary tract   infection or inflammation, similar to the previous CT 2/20/2025.    Findings were discussed with Dr. NELL TOLBERT 3/25/2025 4:30 PM by   Dr. Calzada.    --- End of Report ---        < end of copied text >

## 2025-03-28 NOTE — PROGRESS NOTE ADULT - ASSESSMENT
74 year old male with CKD, sp polio, paraplegia with associated neurogenic bladder s/p suprapubic catheter who was admitted s/p fall on 2/16.   CKD - ESRD, now s/p DEYA [place] 2/17, on HD  ruled out cellulitis around suprapubic cath  2/20 s/p RRT for tremors and confusion -- pt with chronic tremors although notably worse  2/20 CXR with clear lungs   SPC site with chronic/stable inflammatory changes, no drainage - no sign of SSTI  CTA abd with occlusion of b/l external iliac arteries and b/l superficial femoral arteries with distal reconstitution at popliteal arteries; patent three vessel runoff of RLE with 2 vessel runoff of LLE with occlusion of L mid anterior tibial artery with distal reconstitution  Vascular following for worsening PAD with worsening ischemic changes   -3/3 s/p RLE angiogram b/l iliac artery stents   mental status at baseline    3/16 no tenderness at suprapubic catheter site, no visible erythema at catheter site on exam  UA w/ many epithelial cells, Ucx negative  s/p zosyn 3/14-3/16  3/19 SPC site remains without erythema but now noted with some tan crusting on dressing  3/23 reporting vomiting x5 episodes, no diarrhea or other focal sx  Treated for possible SPC site infection with bactrim 3/19-3/25  3/25 CT Chest noted RLL consolidative opacities, LLL opacities - pneumonia vs atelectasis, few ill defined nodular opacities in LLL infectious vs inflammatory  3/25 CTAP with subcutaneous gas and infiltration tracking along medial R buttock, possibly related to decubitus wound with gas tracking from the external environment, possible necrotizing air forming infection; rectal wall thickening suggestive of proctitis; b/l mild hydroureteronephrosis to level of bladder with no obstruction and diffuse urothelial thickening of b/l renal collecting systems and ureters similar to 2/20  MRSA screen has been negative   pt with no resp symptoms- no cough, dyspnea or pain, may have aspirated with vomiting recently, saturating well on RA  3/26 s/p bedside debridement of L knee wound  as dry eschar  from wound edges without drainage and bedside excisional debridement of unstageable sacrum to left buttock into perineum evolving unstageable pressure injury through necrotic dermis into nonviable subcutaneous tissues, debulking debridement of necrotic tissue done by Wound care; other wounds noted including L calf to ankle wound with liquefaction necrotic drainage; R ischium wound with moist pink granular tissue and L buttock fading DTI  remains afebrile, WBC overall downtrended-wnl, reports decreased pain overall    Recommendations:   Continue meropenem (renally dosed) based on prior cultures  -given location of wounds, patient at risk of contamination, further skin/wound breakdown due to pressure 2/2 being bedbound and prolonged course of antibiotics unlikely to resolve this and will increase risk of C.diff, development of resistant organisms making treatment of infection more difficult in the future in addition to antibiotic adverse effects, therefore, recommend short course 10d course until 4/3/25   With continued local wound care, nutrition, offloading as able  HD per renal    Monitor temps/WBC  Wound care following   Continue rest of care per primary team     Over the weekend Dr. Dat Davis will be covering for our group. If you have any questions, concerns or new micro data please reach out to them using TEAMS *PREFERRED* or by calling our service at [phone].     Bridgette Ramirez M.D.  [facility]  Available on [employer] TEAMS - *PREFERRED*  [phone]  After 5pm on weekdays and all day on weekends - please call [phone]     Thank you for consulting us and involving us in the management of this patients case. In addition to reviewing history, imaging, documents, labs, microbiology, took into account antibiotic stewardship, local antibiogram and infection control strategies and potential transmission issues at time of treatment decision making process.

## 2025-03-28 NOTE — PROGRESS NOTE ADULT - PROBLEM SELECTOR PLAN 1
CT chest with new RLL consolidation and LLL opacities, tracheal debris  -Possibly aspiration related s/p episode of vomiting  -Continue ABX as per ID  -Continue bronchodilators  -Keep sats >90% with o2 PRN.

## 2025-03-28 NOTE — PROGRESS NOTE ADULT - ASSESSMENT
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 He is well known to me from multiple recent admisions  s/p admissions at [facility] 12/20-12/25/24 and 2/4-2/7 with SONDRA on CKD with associated uremic symptoms.   At the last admission he had expressed strong interest to try to hold off with HD intiation but he has been getting worse clinically at home.  His SONDRA and uremic symptoms significantly improved after the first admission; they improved a to mild extent during the 2nd admission to the point where he was able to be discharged without HD. Since then, however, he has worsened further.   He has been suffering from progressively worsening diffuse pruritus, loss of appetite, and generalized weakness and falls.   His nephrologist and PCP has been speaking with him and his family over the past few days,   The initial plan was that he would present to the [facility] ER Monday 2/17 (ie tomorrow) for HD initiation. Today, however, he fell and sustained trauma to his knee. Given the fall and concern for knee fracture, he presented to the ER today. multiple xrays show no Fractures.      CKD5, uremia, requiring initiation of HD  Rash, seborrheic dermatitis  Pruritis  Anemia  PAD  SP catheter, chronic  Left inferior pole acute on chronic patella Fx    plan  - HD via R subclavian permacath  - 3/28: pain from decubitus ulcer and Left knee post debridement continues. wound care recs in place. on Meropenem for PNA, pulm and ID following. HD as per renal. HDS  -3/27: ptn is complaining that pain post decubital and left knee debridement has been severe. his daughter is on speaker phone, wife is at bedside. his pain is controlled when meds are administered. he is very uncomfortable due to the pain at the site of decubitus debridement. lyrica helped his pain before but made him drowsy, griffin with gabapentin. he doesnt want them to be resumed. will cont dilaudid, oxycodone, oxycontin, ibuprofen. he is on Meropenem for aspiration PNA/HCAP. [facility] [place] ppx. seen by vascular cardiology to address R IJ post shiley non occlusive DVT. full AC was not recommended. will check rpt doppler in 3-4 weeks.   - 3/26: Wound care performed bedside debridement of Left knee wound 2/2 lifted eschar, and sacral decubitus. findings c/w possible infection and mainly fat necrosis. cont Meropenem. ID following. podiatry following for gangrenous toes. will give additional single dose IV DIlaudid 2/2 severe pain post debridement.    - 3/25: wife at bedside, daughter on speaker phone at bedside. ptn is pain free at the time of visit and states he wants to cont the pain regiment he is presently on. ct C/A/P revealed: RLL PNA, prob aspiration, stercoral proctitis, decubitus ulcer w possible progression to sacral OM, R IJ nonocclusive DVT. these findings d/w ptn ,his family, wound care, ACP, Vascular, Nephrology, ID, pUlm, GI. meropenem initiated, Bactrim stopped. ptn states when he is cleared for DC, he wants to go home , not to [facility], not to SNF. daughter and wife aware.   -3/24: ptn  was seen by GI for N/V, its not clear the etiology, will obtain Ct C/A/P. ptn states he is coughing up green mucus as well  -3/23:  ptn is in good spirits, says he vomited "spit" this am, but tolerated all meals without vomiting. no abd pain, no undigested food in the vomit. afebrile, no diarrhea, RVP neg. GI consulted.   -3/21-22: ptn feels well.  pain is controlled. still no accepting facility for [facility]  -3/20: ptn states he has severe low back and LLE pain though overall is improving.   -3/19: ptn w tan crusting around SPC , states its painful. started on po Bactrim, renally dosed by ID for cellulitis.   -3/18: LLE paraesthesias are chronic, will d/w CM re dc planning to [facility]    -3/17: ptn states he is lethargic, he has LLE numbness which is new and severe pain at SPC insertion site. this was ID, d/w neuro, renal and vascular. as per ID: no sign of an acute infection recommend CT A/P.   -3/16:  urine cx from 3/15 NTD, zosyn DCed, awaiting dc to [facility], not sure will be able to go to [facility] since there is an insurance coverage conflict. HD as per renal  - 3/15: spoke w ptn's daughter today re denial from [facility] for [facility]. ptn has [employer] medicare advantage plan, which [facility] doesnt accept. i recommended to speak  to Cm and to the insurance company to find participating facilities. ptn is stable today. pain and erythema at SP catheter site has resolved today. seen by ID, will cont ZOSYN for now. UCx sent.   - 3/14: suprapubic tube changhed by , ptn has crusting at the insertion site and pain. will start Abx, urine always has sediment, will sent for cx, start Vanco/ZOsyn. ID consult called. check MRSA/MSSA PCR  -3/13: ptn is doing well. ptn has an accepting rehab facility, now pending AUTH.   -3/12: ptn is no longer constipated. doing well off prn IV DIlaudid. awaiting dc to [facility]  -3/11: ptn has no new c/o other than feeling constipated, lactulose ordered. also in preparation for [facility] will dc prn IV DIlaudid, cont prn OXy IR  -3/10:  ptn is awake, alert, wife at bedside, i instructed them to give rehab choices. also awaiting SPC change to be done by urology. pain is controlled  -3/9: seen by PT, will refer to [facility]. choices to be given in AM. this was d/w ptn, daughter, wife.   -3/8:  ptn didnt want to get PT eval done, will reorder. ptn needs [facility] placement. PMNR consult ordered  - 3/7: ptn is alert , pain is controlled, DC planning d/w ptn and HCP, daughter Yanique  -3/6:  ptn is awake, alert, ecchymotic feet look improved, pain is controlled. DC planning to [facility]  3/4-5 - s/p b/l iliac arteries angioplasty and stent placements on 3/3. today has new ecchymoses in b/l LE, concern for arterial insufficiency. vascular aware.. wound from Left knee immobilizer is dressed and healing. pain is controlled.   LEFT UE AVF placement/scheduling will be on outptn basis as per vascular. dc planning to [facility]  - 3/3: ptn is s/p angiogram RLE : b/l iliac arteries w successful angioplasty and stents. in PACU. awaitng HD.  ptn has a pressure wound from the LLE immobilizer posteriorly proximal to the knee. its been on 2/2 knee fracture, applied by ortho and managed by ptn's wife as per ptn's and wife's request , this was d/w nursing and nurse manager ms Ruiz.. immobilizer should be managed by orthopedics and nursing. will keep it off, only keep on when repositioning for care and then remove. wound care to F/U . this was discussed at length w daughter Yanique and wife Dee Dee.   -3/1-3/2: ptn is smiling, pain free, had HD 2/28 and 3/1, awaiting RLE angiogram 3/3, on Heparin drip, euvolemic  -2/28: ptn is lethargic, awaiting RLE angiogram possible fem-fem bypass hopefully on 3/3 pending OR availability, awaiting HD today    -2/27:  plan for angiogram in am with possible angioplasty, possible stent, possible fem-fem bypass. medically cleared for angiogram and possible surgery, stent, angioplasty. pain is controlled. remains on heparin drip as per vascular. HD as per renal    -2/26:  ptn is sleeping, pain is controlled, he was seen by wound care and vascular attending. planning on angiogram 2/2 severe PAD in LE on CTA. he also spoke to HCP. ptn and HCP in agreement. ptn was started on HEPARIN drip as per vascular recs. RIJ permacath done 2/25    - 2/25: ptn states he is hallucinating, but not at present, his MS is at baseline, his pain is controlled, he still needs prn pain meds when he is moved in the bed or for testing or for HD. awaiting Permacath, AVF creation, LE bypass to be d/w Dr. Swenson and the ptn tomorrow. ptn has cardiology clearance  if he opts for. Ptn has sacral decubiti and posterior thigh and buttock decibiti and b/l heel decubiti. Z flow fabienne d/w RN, get wound care consult    -2/24: pain is controlled  if the LLE is immobile and fully extended. doesn't tolerate the knee immobilizer. has a medium condyle and acute on chronic patella fx in LEFT knee. as per vascular ptn will need iliac stent  w a fem-fem bypass, possible axillo-femoral bypass. for this surgery he would need cardiac work up . more pressing surgery is LUE AVF creation,  in IR will arrange for Permacath. for pain control: Motrin 400 mg q12H, Oxy ER 30 mg q12H, Oxy IR 10 for mod pain and dilaudid 2 mg iv for severe pain. still has pruritis though improved, will raise atarax 25 mg to tid. plan of care d/w daughter Norma Persaud , ptn and his wife. Also d/w renal, card, vascular, ACP    -2/23: Daughter Yanique is the HCP, she and the ptn filled out the paperwork. daily plan and findings d/w her and the ptn. MS is at abseline, c/o b/l LE foot pain and Left Knee pain. xrays of left knee: cannot r/o acute on chronic inferior pole patellar Fx. knee immobilizer is on, awaiting ortho consult. doubt needs any intervention awaiting Ct chest today, will add CT Left knee. ptn states itching has recurred, severe pain has recurred. will resume Atatrax ( 25 mg bid) and Oxycodone ER , but at a lower dose 15 mg q12H. cont prn analgesics. HD as per renal, awaiting Vascular consult f/u re CTA A/L &LE and recs. ptn also wants AVF surgery on this admission. Coughing has stopped    - 2/22: ptn is drowsy but arousable, calmer today, answers questions appropriately. he is pain free, he stopped coughing, he denies having pruritis: will DC:  OXY ER, Atarax, Tessalon perles.     -  2/21: ptn is tearful, a bit confused, coughing, recognizes me and family at bedside. GOC d/w daughter and wife. AMS prob delirium 2/2 acute illness +/- opiods, lyrica, atarac. will lower atarak to tid, dc lyrica, cont Oxy 2/2 ptn has severe LE pain. neuro called for eval. Head ct done yesterday w no acute findings. CTA A/P w LE run fof: severe PAD, vascular to follow up on plan fo care. plan for HD tomorrow and next week place on tiw schedule of MWF. will need tunneled catheter prior to DC and will d/w vascular scheduling of AVF. ptn wants all to be done while inptn. daughter wants to be HCP as per ptn's wishes. she was given paperwork to get it signed and witnessed and will present to nursing thereafter. details of findings and complaints and plan of care d/w daughter and wife. spent 60 min. RVP ordered. start mucinex , tessalon perles, duonebs, get CT chest to r/o PNA. pulm called    -  2/20:  ptn had RRT 2/2 AMS and tremors. no SZ, no LOC. noted to have Na 123( hyponatremia), given 500 cc NS. Gabapentin DCed. ptn had been taking gabapentin at home PTA. Pain is controlled. Pruritis resolved, tolerating HD otherwise.     - Pain management:  cont Oxycodone 10 IR q6H,  DIlaudid prn severe pain 2 mg q4H prn.   - cont outptn meds  - DVT ppx w HSC

## 2025-03-28 NOTE — PROGRESS NOTE ADULT - ASSESSMENT
74 year old man with SONDRA on CKD requiring HD, now with nausea    1. ESRD on HD (new)     2. Nausea/dyspepsia  - improved  -favor r/t newly started HD  -PPI daily for GERD   -tolerating diet    3. Proctitis d/t constipation which has since resolved   -maintain on senna qhs with Miralax twice daily   -enema as needed     4. Decubitus Ulcer   CT noted, f/u primary, ID and wound care teams                 I had a prolonged conversation with the patient regarding the hospital course, differential diagnosis, results of diagnostic tests this far, and therapeutic modalities available. Plan of care discussed with the patient after the evaluation. Patient expresses a clear understanding of the plan of care.  Sixty five minutes spent on the total encounter, of which more than fifty percent of the encounter was spent on counseling and/or coordinating care by the attending physician.  Advanced care planning forms were discussed. Code status including forceful chest compressions, defibrillation and intubation were discussed. The risks benefits and alternatives to pertinent gastrointestinal procedures and interventions were discussed in detail and all questions were answered. Duration: 15 Minutes.    [facility]  Clive Hutson M.D.   [address] [place] [address]  Office: [phone]

## 2025-03-29 NOTE — ADVANCED PRACTICE NURSE CONSULT - REASON FOR CONSULT
Wound consultation re-evaluation; Multiple pressure injuries.  HPI:  Wound consultation re-evaluation; Multiple pressure injuries.  HPI: 74y old  Male who presents with a chief complaint of ESRD requiring HD, uremia

## 2025-03-29 NOTE — PROGRESS NOTE ADULT - ASSESSMENT
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 He is well known to me from multiple recent admisions  s/p admissions at [facility] 12/20-12/25/24 and 2/4-2/7 with SONDRA on CKD with associated uremic symptoms.   At the last admission he had expressed strong interest to try to hold off with HD intiation but he has been getting worse clinically at home.  His SONDRA and uremic symptoms significantly improved after the first admission; they improved a to mild extent during the 2nd admission to the point where he was able to be discharged without HD. Since then, however, he has worsened further.   He has been suffering from progressively worsening diffuse pruritus, loss of appetite, and generalized weakness and falls.   His nephrologist and PCP has been speaking with him and his family over the past few days,   The initial plan was that he would present to the [facility] ER Monday 2/17 (ie tomorrow) for HD initiation. Today, however, he fell and sustained trauma to his knee. Given the fall and concern for knee fracture, he presented to the ER today. multiple xrays show no Fractures.      CKD5, uremia, requiring initiation of HD  Rash, seborrheic dermatitis  Pruritis  Anemia  PAD  SP catheter, chronic  Left inferior pole acute on chronic patella Fx    plan  - HD via R subclavian permacath  - Pneumonia   HD as per renal

## 2025-03-29 NOTE — PROGRESS NOTE ADULT - ASSESSMENT
74 year old man with SONDRA on CKD requiring HD, now with nausea    1. ESRD on HD (new)     2. Nausea/dyspepsia  - improved  -favor r/t newly started HD  -PPI daily for GERD   -tolerating diet    3. Proctitis d/t constipation which has since resolved   -maintain on senna qhs with Miralax twice daily   -enema as needed     4. Decubitus Ulcer   CT noted, f/u primary, ID and wound care teams                 I had a prolonged conversation with the patient regarding the hospital course, differential diagnosis, results of diagnostic tests this far, and therapeutic modalities available. Plan of care discussed with the patient after the evaluation. Patient expresses a clear understanding of the plan of care.  Sixty five minutes spent on the total encounter, of which more than fifty percent of the encounter was spent on counseling and/or coordinating care by the attending physician.  Advanced care planning forms were discussed. Code status including forceful chest compressions, defibrillation and intubation were discussed. The risks benefits and alternatives to pertinent gastrointestinal procedures and interventions were discussed in detail and all questions were answered. Duration: 15 Minutes.    [facility]  Clive uHtson M.D.   [address] [place] [address]  Office: [phone]

## 2025-03-29 NOTE — PROGRESS NOTE ADULT - SUBJECTIVE AND OBJECTIVE BOX
Patient is a 74y old  Male who presents with a chief complaint of ESRD requiring HD, uremia (28 Mar 2025 12:33)      SUBJECTIVE / OVERNIGHT EVENTS: pain from decubitus ulcer and Left knee post debridement continues. wound care recs in place. on Meropenem for PNA, pulm and ID following. HD as per renal. HDS    T(C): 36.7 (03-29-25 @ 16:03), Max: 36.7 (03-29-25 @ 16:03)  HR: 60 (03-29-25 @ 16:03) (60 - 60)  BP: 123/65 (03-29-25 @ 16:03) (123/65 - 123/65)  RR: 18 (03-29-25 @ 16:03) (18 - 18)  SpO2: 95% (03-29-25 @ 16:03) (95% - 95%)      MEDICATIONS  (STANDING):  albuterol/ipratropium for Nebulization 3 milliLiter(s) Nebulizer every 6 hours  aspirin  chewable 81 milliGRAM(s) Oral daily  atorvastatin 40 milliGRAM(s) Oral at bedtime  chlorhexidine 2% Cloths 1 Application(s) Topical daily  clopidogrel Tablet 75 milliGRAM(s) Oral daily  Dakins Solution - 1/4 Strength 1 Application(s) Topical every 8 hours  epoetin aleah-epbx (RETACRIT) Injectable 52261 Unit(s) IV Push <User Schedule>  heparin   Injectable 5000 Unit(s) SubCutaneous every 8 hours  hydrOXYzine hydrochloride 25 milliGRAM(s) Oral three times a day  ibuprofen  Tablet. 400 milliGRAM(s) Oral every 12 hours  lactobacillus acidophilus 1 Tablet(s) Oral two times a day with meals  meropenem  IVPB 500 milliGRAM(s) IV Intermittent every 12 hours  mupirocin 2% Ointment 1 Application(s) Topical <User Schedule>  Nephro-margaret 1 Tablet(s) Oral daily  oxyCODONE  ER Tablet 30 milliGRAM(s) Oral every 12 hours  pantoprazole    Tablet 40 milliGRAM(s) Oral before breakfast  polyethylene glycol 3350 17 Gram(s) Oral daily  povidone iodine 10% Solution 1 Application(s) Topical two times a day  senna 2 Tablet(s) Oral at bedtime    MEDICATIONS  (PRN):  acetaminophen     Tablet .. 650 milliGRAM(s) Oral every 6 hours PRN Temp greater or equal to 38C (100.4F), Mild Pain (1 - 3)  benzocaine/menthol Lozenge 1 Lozenge Oral three times a day PRN Sore Throat  bisacodyl 5 milliGRAM(s) Oral every 12 hours PRN Constipation  HYDROmorphone  Injectable 2 milliGRAM(s) IV Push every 4 hours PRN Severe Pain (7 - 10)  HYDROmorphone  Injectable 2 milliGRAM(s) IV Push once PRN Pain  oxyCODONE    IR 10 milliGRAM(s) Oral every 4 hours PRN Moderate Pain (4 - 6)  sodium chloride 0.9% lock flush 10 milliLiter(s) IV Push every 1 hour PRN Pre/post blood products, medications, blood draw, and to maintain line patency      PHYSICAL EXAM:  GENERAL: NAD, well-developed  HEAD:  Atraumatic, Normocephalic  EYES: EOMI, conjunctiva and sclera clear  NECK: Supple, No JVD  CHEST/LUNG: Clear to auscultation bilaterally; No wheeze  HEART: Regular rate and rhythm; No murmurs, rubs, or gallops  ABDOMEN: Soft, Nontender, Nondistended; Bowel sounds present  EXTREMITIES:  2+ Peripheral Pulses, No clubbing, cyanosis, or edema  PSYCH: AAOx3  NEUROLOGY: non-focal  SKIN: No rashes or lesions                          9.3    8.20  )-----------( 293      ( 28 Mar 2025 10:23 )             31.3               136|101|19<157  3.8|23|2.52  7.6,--,--  03-29 @ 10:16

## 2025-03-29 NOTE — PROGRESS NOTE ADULT - SUBJECTIVE AND OBJECTIVE BOX
INTERVAL HPI/OVERNIGHT EVENTS:    no GI complaints          acetaminophen     Tablet .. 650 milliGRAM(s) Oral every 6 hours PRN  albuterol/ipratropium for Nebulization 3 milliLiter(s) Nebulizer every 6 hours  aspirin  chewable 81 milliGRAM(s) Oral daily  atorvastatin 40 milliGRAM(s) Oral at bedtime  benzocaine/menthol Lozenge 1 Lozenge Oral three times a day PRN  bisacodyl 5 milliGRAM(s) Oral every 12 hours PRN  chlorhexidine 2% Cloths 1 Application(s) Topical daily  clopidogrel Tablet 75 milliGRAM(s) Oral daily  Dakins Solution - 1/4 Strength 1 Application(s) Topical every 8 hours  epoetin aleah-epbx (RETACRIT) Injectable 80685 Unit(s) IV Push <User Schedule>  heparin   Injectable 5000 Unit(s) SubCutaneous every 8 hours  HYDROmorphone  Injectable 2 milliGRAM(s) IV Push once PRN  HYDROmorphone  Injectable 2 milliGRAM(s) IV Push every 4 hours PRN  hydrOXYzine hydrochloride 25 milliGRAM(s) Oral three times a day  ibuprofen  Tablet. 400 milliGRAM(s) Oral every 12 hours  lactobacillus acidophilus 1 Tablet(s) Oral two times a day with meals  meropenem  IVPB 500 milliGRAM(s) IV Intermittent every 12 hours  mupirocin 2% Ointment 1 Application(s) Topical <User Schedule>  Nephro-margaret 1 Tablet(s) Oral daily  oxyCODONE    IR 10 milliGRAM(s) Oral every 4 hours PRN  oxyCODONE  ER Tablet 30 milliGRAM(s) Oral every 12 hours  pantoprazole    Tablet 40 milliGRAM(s) Oral before breakfast  polyethylene glycol 3350 17 Gram(s) Oral daily  povidone iodine 10% Solution 1 Application(s) Topical two times a day  senna 2 Tablet(s) Oral at bedtime  sodium chloride 0.9% lock flush 10 milliLiter(s) IV Push every 1 hour PRN                            9.3    8.20  )-----------( 293      ( 28 Mar 2025 10:23 )             31.3       Hemoglobin: 9.3 g/dL (03-28 @ 10:23)      03-29    136  |  101  |  19  ----------------------------<  157[H]  3.8   |  23  |  2.52[H]    Ca    7.6[L]      29 Mar 2025 10:16      Creatinine Trend: 2.52<--, 3.21<--, 4.04<--, 3.43<--, 2.84<--, 2.78<--    COAGS:           T(C): 36.4 (03-28-25 @ 23:56), Max: 36.4 (03-28-25 @ 11:14)  HR: 81 (03-28-25 @ 23:56) (81 - 85)  BP: 127/61 (03-28-25 @ 23:56) (113/54 - 142/63)  RR: 18 (03-28-25 @ 23:56) (18 - 18)  SpO2: 95% (03-28-25 @ 23:56) (95% - 96%)  Wt(kg): --    I&O's Summary    28 Mar 2025 07:01  -  29 Mar 2025 07:00  --------------------------------------------------------  IN: 0 mL / OUT: 1600 mL / NET: -1600 mL      Allergies    No Known Allergies    Intolerances        Review of Systems:    General:  No wt loss, fevers, chills, night sweats, fatigue   Eyes:  Good vision, no reported pain  ENT:  No sore throat, pain, runny nose, dysphagia  CV:  No pain, palpitations, hypo/hypertension  Resp:  No dyspnea, cough, tachypnea, wheezing  GI:  No pain, No nausea, No vomiting, No diarrhea, No constipation, No weight loss, No fever, No pruritis, No rectal bleeding, No melena, No dysphagia  :  No pain, bleeding, incontinence, nocturia  Muscle:  No pain, weakness  Neuro:  No weakness, tingling, memory problems  Psych:  No fatigue, insomnia, mood problems, depression  Endocrine:  No polyuria, polydypsia, cold/heat intolerance  Heme:  No petechiae, ecchymosis, easy bruisability  Skin:  No rash, tattoos, scars, edema       PHYSICAL EXAM:    Constitutional: NAD  HEENT: EOMI, throat clear  Neck: No LAD, supple  Respiratory: CTA and P  Cardiovascular: S1 and S2, RRR, no M  Gastrointestinal: BS+, soft, NT/ND, neg HSM,  Extremities: No peripheral edema, neg clubbing, cyanosis  Vascular: 2+ peripheral pulses  Neurological: A/O   Psychiatric: Normal mood, normal affect  Skin: No rashes    2

## 2025-03-29 NOTE — ADVANCED PRACTICE NURSE CONSULT - RECOMMEDATIONS
1. right buttocks, right ischium: apply Triad paste twice daily and prn for soiling  2. b/l heels: apply cavilon daily, off-load heels with the celso-lock cushion, Podiatry consult for b/l heel bullae  3. continue with T&P , use the celso-form positioner to aid in postioning  4. seat cushion when oob to chair  5. care of BLE as per Vascular  6. nutrition consult  Tx plan discussed with daughter/wife/pt/RN, Vascular
Impression    Urinary incontinence  Fecal Incontinence  Multiple Pressure Injuries    Recommendations:     1. Sacrum, Left Leg, Left Heel, Right Heel Left Thighs - Un-Stageable Pressure Injury.    Topical therapy- Cleanse with normal saline 0.9%, pat dry, and apply Medi Honey to wound bed, and apply contact layer dressing [Adaptic Touch] over Medi Honey, cover with dry protective dressing [to hold dressing in place] at bed side. Frequency: Daily & PRN Soiling.    2. Incontinent management - incontinent cleanser, pads, brooke care BID      3. Maintain on an alternating air with low air loss surface       4. Turn & reposition every 2 hr; Use positioning pillow to turn and reposition, soft pillow between bony prominences; continue measures to decrease friction/shear/pressure.      5. Nutrition optimization.     6. Place waffle cushion when out of bed to chair.        7. Right & Left Foot (Digits 1-5). - Managed by Podiatry & Vascular   Topical Therapy: Apply betadine to digits. Frequency: Daily & PRN soiling.

## 2025-03-29 NOTE — PROGRESS NOTE ADULT - ASSESSMENT
on room air  attests to LE pain, otherwise denies related complaints  s/p HD Friday with 1.5 UF  next HD Monday    acetaminophen     Tablet .. 650 milliGRAM(s) Oral every 6 hours PRN  albuterol/ipratropium for Nebulization 3 milliLiter(s) Nebulizer every 6 hours  aspirin  chewable 81 milliGRAM(s) Oral daily  atorvastatin 40 milliGRAM(s) Oral at bedtime  benzocaine/menthol Lozenge 1 Lozenge Oral three times a day PRN  bisacodyl 5 milliGRAM(s) Oral every 12 hours PRN  chlorhexidine 2% Cloths 1 Application(s) Topical daily  clopidogrel Tablet 75 milliGRAM(s) Oral daily  Dakins Solution - 1/4 Strength 1 Application(s) Topical every 8 hours  epoetin aleah-epbx (RETACRIT) Injectable 50373 Unit(s) IV Push <User Schedule>  heparin   Injectable 5000 Unit(s) SubCutaneous every 8 hours  HYDROmorphone  Injectable 2 milliGRAM(s) IV Push once PRN  HYDROmorphone  Injectable 2 milliGRAM(s) IV Push every 4 hours PRN  hydrOXYzine hydrochloride 25 milliGRAM(s) Oral three times a day  ibuprofen  Tablet. 400 milliGRAM(s) Oral every 12 hours  lactobacillus acidophilus 1 Tablet(s) Oral two times a day with meals  meropenem  IVPB 500 milliGRAM(s) IV Intermittent every 12 hours  mupirocin 2% Ointment 1 Application(s) Topical <User Schedule>  Nephro-margaret 1 Tablet(s) Oral daily  oxyCODONE    IR 10 milliGRAM(s) Oral every 4 hours PRN  oxyCODONE  ER Tablet 30 milliGRAM(s) Oral every 12 hours  pantoprazole    Tablet 40 milliGRAM(s) Oral before breakfast  polyethylene glycol 3350 17 Gram(s) Oral daily  povidone iodine 10% Solution 1 Application(s) Topical two times a day  senna 2 Tablet(s) Oral at bedtime  sodium chloride 0.9% lock flush 10 milliLiter(s) IV Push every 1 hour PRN      VITAL:  T(C): , Max: 36.7 (03-29-25 @ 16:03)  T(F): , Max: 98.1 (03-29-25 @ 16:03)  HR: 60 (03-29-25 @ 16:03)  BP: 123/65 (03-29-25 @ 16:03)  BP(mean): --  RR: 18 (03-29-25 @ 16:03)  SpO2: 95% (03-29-25 @ 16:03)  Wt(kg): --    03-28-25 @ 07:01  -  03-29-25 @ 07:00  --------------------------------------------------------  IN: 0 mL / OUT: 1600 mL / NET: -1600 mL        PHYSICAL EXAM:  Constitutional: alert, NAD  HEENT: NCAT, DMM  Neck: Supple, No JVD  Respiratory: CTA-b/l  Cardiovascular: RRR s1s2, no m/r/g  Gastrointestinal: BS+, soft, NT/ND  : (+)suprapubic cath  Extremities: 1+ b/l LE edema  Neurological: reduced generalized strength  Back: no CVAT b/l  Skin: (+)cyanotic changes b/l feet  Access: [facility] tunneled cath (accessed)     LABS:                          9.3    8.20  )-----------( 293      ( 28 Mar 2025 10:23 )             31.3     Na(136)/K(3.8)/Cl(101)/HCO3(23)/BUN(19)/Cr(2.52)Glu(157)/Ca(7.6)/Mg(--)/PO4(--)    03-29 @ 10:16  Na(129)/K(4.2)/Cl(94)/HCO3(24)/BUN(30)/Cr(3.21)Glu(124)/Ca(7.5)/Mg(--)/PO4(--)    03-28 @ 10:24    Urinalysis Basic - ( 29 Mar 2025 10:16 )    Color: x / Appearance: x / SG: x / pH: x  Gluc: 157 mg/dL / Ketone: x  / Bili: x / Urobili: x   Blood: x / Protein: x / Nitrite: x   Leuk Esterase: x / RBC: x / WBC x   Sq Epi: x / Non Sq Epi: x / Bacteria: x                IMPRESSION: 74M w/ polio, neurogenic bladder/suprapubic catheter, 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 s/p HD Friday with 1.5UF, next HD Monday    (2)Hyponatremia - Na+ improving  s/p  high-Na+ bath with HD    (3)Anemia - s/p IV iron; on Retacrit with HD    (4)PAD - s/p LE bypass 3/3/25 - cyanotic changes of toes b/l - for further management after discharge    (5)CV - multifactorial LE edema; improving with increasing intradialytic UF, and with improving nutritional parameters    (6)Neuro - reduced generalized strength - getting PT/awaiting NYDIA    RECOMMEND:   (1)Subsequent HD Monday 3/31/25        Zaid Ricci NP-BC  [email or web address]  [phone]

## 2025-03-29 NOTE — ADVANCED PRACTICE NURSE CONSULT - ASSESSMENT
In to see patient for wound re-evaluation of multiple pressure injuries. Patient was found lying in a low air loss pressure redistribution support surface style bed. Patient is alert, awake, engaging conversation with family member(s) at bed side. Patient expressed consent for skin examination. Once consent was granted, able to view his skin. Once turned, incontinence of stool was apparent evidence by stool on skin, and brooke care was provided and, able to view his skin. Patient is incontinent of urine, patient with a Murillo catheter for urinary diversion.     Skin examination reveals:   ·	Sacrum into Left Buttock. Un-stageable pressure injury. The wound measures approximately 10.4 cm x 17.0 cm x 0.5-0.6 cm. There is undermining from 12-1 o'clock with depth 3.0 cm and undermining from 6-7 o'clock with depth 1.0 cm. Wound bed consists non-adherent soft necrotic tissue "lifting" from wound bed. Surrounding skin; erythematous, non-blanchable. No signs of infection (purulent drainage, foul odor, pain). Cleansed with normal saline 0.9%, pat dry, and applied Medi Honey to wound bed. Covered with dry protective dressing   ·	Right Ischium, Stage III pressure injury. The wound measures approximately 1.4 cm x 2.8 cm x 0.1 cm. The wound bed is pink, moist. Cleansed with normal saline 0.9%, pat dry, and applied Adaptic Touch [Semi-Permeable] wound dressing, covered with dry protective dressing   ·	Left Upper Back, deep tissue injury. Measuring approximately 1.0 cm x 1.5 cm x 0.0 cm. Characterized by non-blanchable deep red / maroon / purple discoloration. Applied Cavilon No-sting barrier wipe to form a gentle, breathable, waterproof, transparent coating on the skin. Covered with Allevyn Foam border dressing   ·	Left Upper Thigh (Distal): Unstageable pressure injury. The wound bed is covered with loose eschar (black/brown necrotic tissue). Surrounding skin erythematous. Drainage amount; moderate serosanguineous. Measuring approximately 14.0 cm x 6.0 cm x 0.8 cm. Cleansed with normal saline, pat dry, and applied Medi Honey at bed side.   ·	Left Upper Thigh (Proximal), extending to anterior thigh: Unstageable pressure injury. The wound bed is covered partially with loose eschar (black/brown necrotic tissue) and with white slough. Surrounding skin erythematous. Drainage amount; moderate serosanguineous. Measuring approximately 4.5 cm x 6.0 cm x 0.4 cm. Cleansed with normal saline, pat dry, and applied Medi Honey at bed side.   ·	Left Kneecap. Wound of unknown etiology [family member states from knee brace]  measuring approximately 4.0 cm x 7.0 cm x 0.1-0.2cm. Drainage amount: Small serosanguineous. The wound bed is 75% covered with loose necrotic tissue, 25% moist granular tissue. Cleansed with normal saline, pat dry, and applied Medi Honey to wound bed, and applied contact layer dressing [Adaptic Touch] over Medi Honey, covered with dry protective dressing [to hold dressing in place] at bed side.   ·	Left Lateral Knee, Distal to Left Kneecap wound measuring approximately 3.5 cm x 4.1 cm x 0.1-0.2 cm. Drainage amount: Small serosanguineous. The wound bed is 75% covered with loose necrotic tissue, 25% moist granular tissue. Cleansed with normal saline, pat dry, and applied Medi Honey to wound bed, and applied contact layer dressing [Adaptic Touch] over Medi Honey, covered with dry protective dressing [to hold dressing in place] at bed side.   ·	Left Posterior Leg, wound of unknown etiology [family member states from knee brace] measuring approximately 10.8 cm x 5.0 cm x 0.4cm. Drainage amount: Moderate serosanguineous drainage. The wound bed is 75% covered with white fibrin tissue, 25% moist granular tissue. Cleansed with normal saline, pat dry, and applied Medi Honey to wound bed, and applied contact layer dressing [Adaptic Touch] over Medi Honey, covered with dry protective dressing [to hold dressing in place] at bed side.   ·	Left Posterior heel unstageable pressure injury. Measuring approximately 16.0 cm x 8.0 cm x unknown wound depth. The wound bed is covered with soft, necrotic tissue. Cleansed with normal saline, pat dry, and applied Medi Honey at bed side  ·	Right Lateroposterior heel, unstageable pressure injury. Measuring approximately 4.0 cm x 5.0 cm x unknown wound depth. The wound bed is covered with firm, necrotic tissue. Applied Betadine at bed side, covered with dry protective dressing.     Patient, and RN were educated on the importance of turning and positioning every 2 hours, the need for routine turning and positioning to prevent pressure. The use of waffle cushion when out of bed to chair, and to shift his weight every 2 hours while in chair. The importance of keeping his skin clean and dry and to offload feet/heels, and optimal nutrition.         Discussed with family member(s) conditions that may affect wound healing, such as diabetes, acute hypoxic respiratory failure, immobility. Patient's family member(s) in understanding of same.     All questions answered to family member(s) satisfaction      Wound team will F/U    In to see patient for wound re-evaluation of multiple pressure injuries. Patient was found lying in a low air loss pressure redistribution support surface style bed. Patient is alert, awake, engaging conversation with family member(s) at bed side. Patient expressed consent for skin examination. Once consent was granted, able to view his skin. Once turned, incontinence of stool was apparent evidence by stool on skin, and brooke care was provided and, able to view his skin. Patient is incontinent of urine, patient with a Murillo catheter for urinary diversion.     Skin examination reveals:   ·	Sacrum into Left Buttock. Un-stageable pressure injury. The wound measures approximately 10.4 cm x 17.0 cm x 0.5-0.6 cm. There is undermining from 12-1 o'clock with depth 3.0 cm and undermining from 6-7 o'clock with depth 1.0 cm. Wound bed consists non-adherent soft necrotic tissue "lifting" from wound bed. Surrounding skin; erythematous, non-blanchable. No signs of infection (purulent drainage, foul odor, pain). Cleansed with normal saline 0.9%, pat dry, and applied Medi Honey to wound bed. Covered with dry protective dressing   ·	Right Ischium, Stage III pressure injury. The wound measures approximately 1.4 cm x 2.8 cm x 0.1 cm. The wound bed is pink, moist. Cleansed with normal saline 0.9%, pat dry, and applied Adaptic Touch [Semi-Permeable] wound dressing, covered with dry protective dressing   ·	Left Upper Back, deep tissue injury. Measuring approximately 1.0 cm x 1.5 cm x 0.0 cm. Characterized by non-blanchable deep red / maroon / purple discoloration. Applied Cavilon No-sting barrier wipe to form a gentle, breathable, waterproof, transparent coating on the skin. Covered with Allevyn Foam border dressing   ·	Left Upper Thigh (Distal): Unstageable pressure injury. The wound bed is covered with loose eschar (black/brown necrotic tissue). Surrounding skin erythematous. Drainage amount; moderate serosanguineous. Measuring approximately 14.0 cm x 6.0 cm x 0.8 cm. Cleansed with normal saline, pat dry, and applied Medi Honey at bed side.   ·	Left Upper Thigh (Proximal), extending to anterior thigh: Unstageable pressure injury. The wound bed is covered partially with loose eschar (black/brown necrotic tissue) and with white slough. Surrounding skin erythematous. Drainage amount; moderate serosanguineous. Measuring approximately 4.5 cm x 6.0 cm x 0.4 cm. Cleansed with normal saline, pat dry, and applied Medi Honey at bed side.   ·	Left Kneecap. Wound of unknown etiology [family member states from knee brace]  measuring approximately 4.0 cm x 7.0 cm x 0.1-0.2cm. Drainage amount: Small serosanguineous. The wound bed is 75% covered with loose necrotic tissue, 25% moist granular tissue. Cleansed with normal saline, pat dry, and applied Medi Honey to wound bed, and applied contact layer dressing [Adaptic Touch] over Medi Honey, covered with dry protective dressing [to hold dressing in place] at bed side.   ·	Left Lateral Knee, Distal to Left Kneecap wound measuring approximately 3.5 cm x 4.1 cm x 0.1-0.2 cm. Drainage amount: Small serosanguineous. The wound bed is 75% covered with loose necrotic tissue, 25% moist granular tissue. Cleansed with normal saline, pat dry, and applied Medi Honey to wound bed, and applied contact layer dressing [Adaptic Touch] over Medi Honey, covered with dry protective dressing [to hold dressing in place] at bed side.   ·	Left Posterior Leg, wound of unknown etiology [family member states from knee brace] measuring approximately 10.8 cm x 5.0 cm x 0.4cm. Drainage amount: Moderate serosanguineous drainage. The wound bed is 75% covered with white fibrin tissue, 25% moist granular tissue. Cleansed with normal saline, pat dry, and applied Medi Honey to wound bed, and applied contact layer dressing [Adaptic Touch] over Medi Honey, covered with dry protective dressing [to hold dressing in place] at bed side.   ·	Left Posterior heel unstageable pressure injury. Measuring approximately 16.0 cm x 8.0 cm x unknown wound depth. The wound bed is covered with soft, necrotic tissue. Cleansed with normal saline, pat dry, and applied Medi Honey at bed side  ·	Right Lateroposterior heel, unstageable pressure injury. Measuring approximately 4.0 cm x 5.0 cm x unknown wound depth. The wound bed is covered with firm, necrotic tissue. Applied Betadine at bed side, covered with dry protective dressing.   ·	Right & Left Foot (Digits 1-5) Skin discoloration ranging from pale to blue, to black. The digits are cool, dry, shrunken, and wrinkled. Applied betadine at bed side, kept open to air. - Managed by Podiatry & Vascular     Patient, and RN were educated on the importance of turning and positioning every 2 hours, the need for routine turning and positioning to prevent pressure. The use of waffle cushion when out of bed to chair, and to shift his weight every 2 hours while in chair. The importance of keeping his skin clean and dry and to offload feet/heels, and optimal nutrition.         Discussed with family member(s) conditions that may affect wound healing including but not limited to; immobility, severe peripheral artery disease, and end stage renal disease. Patient's family member(s) in understanding of same.     All questions answered to family member(s) satisfaction. Pain addressed at time of visit due to pain associated with dressing changes. Patient tolerated dressing changes well with contact layer dressings.     Discussed plan of care with RN.

## 2025-03-29 NOTE — PROGRESS NOTE ADULT - SUBJECTIVE AND OBJECTIVE BOX
Subjective: Patient seen and examined. No new events except as noted.     REVIEW OF SYSTEMS:    CONSTITUTIONAL: + weakness, fevers or chills  EYES/ENT: No visual changes;  No vertigo or throat pain   NECK: No pain or stiffness  RESPIRATORY: No cough, wheezing, hemoptysis; No shortness of breath  CARDIOVASCULAR: No chest pain or palpitations  GASTROINTESTINAL: No abdominal or epigastric pain. No nausea, vomiting, or hematemesis; No diarrhea or constipation. No melena or hematochezia.  GENITOURINARY: No dysuria, frequency or hematuria  NEUROLOGICAL: No numbness or weakness  SKIN: No itching, burning, rashes, or lesions   All other review of systems is negative unless indicated above.    MEDICATIONS:  MEDICATIONS  (STANDING):  albuterol/ipratropium for Nebulization 3 milliLiter(s) Nebulizer every 6 hours  aspirin  chewable 81 milliGRAM(s) Oral daily  atorvastatin 40 milliGRAM(s) Oral at bedtime  chlorhexidine 2% Cloths 1 Application(s) Topical daily  clopidogrel Tablet 75 milliGRAM(s) Oral daily  Dakins Solution - 1/4 Strength 1 Application(s) Topical every 8 hours  epoetin aleah-epbx (RETACRIT) Injectable 93649 Unit(s) IV Push <User Schedule>  heparin   Injectable 5000 Unit(s) SubCutaneous every 8 hours  hydrOXYzine hydrochloride 25 milliGRAM(s) Oral three times a day  ibuprofen  Tablet. 400 milliGRAM(s) Oral every 12 hours  lactobacillus acidophilus 1 Tablet(s) Oral two times a day with meals  meropenem  IVPB 500 milliGRAM(s) IV Intermittent every 12 hours  mupirocin 2% Ointment 1 Application(s) Topical <User Schedule>  Nephro-maragret 1 Tablet(s) Oral daily  oxyCODONE  ER Tablet 30 milliGRAM(s) Oral every 12 hours  pantoprazole    Tablet 40 milliGRAM(s) Oral before breakfast  polyethylene glycol 3350 17 Gram(s) Oral daily  povidone iodine 10% Solution 1 Application(s) Topical two times a day  senna 2 Tablet(s) Oral at bedtime      PHYSICAL EXAM:  T(C): 36.7 (03-29-25 @ 16:03), Max: 36.7 (03-29-25 @ 16:03)  HR: 60 (03-29-25 @ 16:03) (60 - 81)  BP: 123/65 (03-29-25 @ 16:03) (123/65 - 127/61)  RR: 18 (03-29-25 @ 16:03) (18 - 18)  SpO2: 95% (03-29-25 @ 16:03) (95% - 95%)  Wt(kg): --  I&O's Summary    28 Mar 2025 07:01  -  29 Mar 2025 07:00  --------------------------------------------------------  IN: 0 mL / OUT: 1600 mL / NET: -1600 mL          Appearance: NAD  HEENT:  Dry oral mucosa, PERRL, EOMI	  Lymphatic: No lymphadenopathy  Cardiovascular: Normal S1 S2, No JVD, No murmurs, No edema  Respiratory: Lungs clear to auscultation	  Psychiatry: A & O x 3, Mood & affect appropriate  Skin: No rashes, No ecchymoses, No cyanosis	  Neurologic: Non-focal  GI/Abd: Soft, obese, nontender. Dressing to [facility] C/D/I. Suprapubic catheter in place  Ext: Palp femoral pulses bilat. BLE atrophic, minimal dorsi/plantarflexion bilaterally. Sensation grossly intact. LLE edematous compared to R, erythema to right toes/forefoot. mottling of right toes/forefoot/heel  LLE:  small superficial abrasion, +edema and +ecchymosis over L knee  +TTP over L knee, no TTP along remainder of extremity; compartments soft  Limited ROM at knee 2/2 pain  No gross varus/valgus laxity, but assessment limited 2/2 pain  Motor: TA/EHL/GS/FHL intact  Sensory: DP/SP/Tib/Jordan/Saph SILT  +DP pulse (symmetric relative to contralateral side), WWP   pressure wound from the LLE immobilizer posteriorly proximal to the knee.  Vascular: Peripheral pulses palpable 2+ bilaterally          LABS:    CARDIAC MARKERS:                                9.3    8.20  )-----------( 293      ( 28 Mar 2025 10:23 )             31.3     03-29    136  |  101  |  19  ----------------------------<  157[H]  3.8   |  23  |  2.52[H]    Ca    7.6[L]      29 Mar 2025 10:16      proBNP:   Lipid Profile:   HgA1c:   TSH:             TELEMETRY: 	    ECG:  	  RADIOLOGY:   DIAGNOSTIC TESTING:  [ ] Echocardiogram:  [ ]  Catheterization:  [ ] Stress Test:    OTHER:

## 2025-03-30 NOTE — PROGRESS NOTE ADULT - SUBJECTIVE AND OBJECTIVE BOX
Patient is a 75y old  Male who presents with a chief complaint of ESRD requiring HD, uremia (30 Mar 2025 12:47)      SUBJECTIVE / OVERNIGHT EVENTS: Tmax 100.9, c/o dry cough and diarrhea but not watery. pain is controlled at the time of visit. wife at bedside.     MEDICATIONS  (STANDING):  albuterol/ipratropium for Nebulization 3 milliLiter(s) Nebulizer every 6 hours  aspirin  chewable 81 milliGRAM(s) Oral daily  atorvastatin 40 milliGRAM(s) Oral at bedtime  chlorhexidine 2% Cloths 1 Application(s) Topical daily  clopidogrel Tablet 75 milliGRAM(s) Oral daily  Dakins Solution - 1/4 Strength 1 Application(s) Topical every 8 hours  epoetin aleah-epbx (RETACRIT) Injectable 04916 Unit(s) IV Push <User Schedule>  heparin   Injectable 5000 Unit(s) SubCutaneous every 8 hours  hydrOXYzine hydrochloride 25 milliGRAM(s) Oral three times a day  ibuprofen  Tablet. 400 milliGRAM(s) Oral every 12 hours  lactobacillus acidophilus 1 Tablet(s) Oral two times a day with meals  meropenem  IVPB 500 milliGRAM(s) IV Intermittent every 12 hours  mupirocin 2% Ointment 1 Application(s) Topical <User Schedule>  Nephro-margaret 1 Tablet(s) Oral daily  oxyCODONE  ER Tablet 30 milliGRAM(s) Oral every 12 hours  pantoprazole    Tablet 40 milliGRAM(s) Oral before breakfast  polyethylene glycol 3350 17 Gram(s) Oral daily  povidone iodine 10% Solution 1 Application(s) Topical two times a day  senna 2 Tablet(s) Oral at bedtime    MEDICATIONS  (PRN):  acetaminophen     Tablet .. 650 milliGRAM(s) Oral every 6 hours PRN Temp greater or equal to 38C (100.4F), Mild Pain (1 - 3)  benzocaine/menthol Lozenge 1 Lozenge Oral three times a day PRN Sore Throat  bisacodyl 5 milliGRAM(s) Oral every 12 hours PRN Constipation  HYDROmorphone  Injectable 2 milliGRAM(s) IV Push every 4 hours PRN Severe Pain (7 - 10)  oxyCODONE    IR 10 milliGRAM(s) Oral every 4 hours PRN Moderate Pain (4 - 6)  sodium chloride 0.9% lock flush 10 milliLiter(s) IV Push every 1 hour PRN Pre/post blood products, medications, blood draw, and to maintain line patency      Vital Signs Last 24 Hrs  T(F): 98.3 (03-30-25 @ 08:50), Max: 100.9 (03-30-25 @ 00:20)  HR: 85 (03-30-25 @ 08:50) (85 - 103)  BP: 109/65 (03-30-25 @ 08:50) (109/65 - 120/69)  RR: 18 (03-30-25 @ 08:50) (18 - 18)  SpO2: 93% (03-30-25 @ 08:50) (92% - 93%)  Telemetry:   CAPILLARY BLOOD GLUCOSE        I&O's Summary    30 Mar 2025 07:01  -  30 Mar 2025 21:37  --------------------------------------------------------  IN: 50 mL / OUT: 300 mL / NET: -250 mL        PHYSICAL EXAM:  GENERAL: NAD, well-developed  HEAD:  Atraumatic, Normocephalic  EYES: EOMI, PERRLA, conjunctiva and sclera clear  NECK: Supple, No JVD  CHEST/LUNG: Clear to auscultation bilaterally; No wheeze  HEART: Regular rate and rhythm; No murmurs, rubs, or gallops  ABDOMEN: Soft, Nontender, Nondistended; Bowel sounds present  EXTREMITIES:  2+ Peripheral Pulses, No clubbing, cyanosis, or edema  PSYCH: AAOx3  NEUROLOGY: non-focal  SKIN: No rashes or lesions    LABS:    03-29    136  |  101  |  19  ----------------------------<  157[H]  3.8   |  23  |  2.52[H]    Ca    7.6[L]      29 Mar 2025 10:16            Urinalysis Basic - ( 29 Mar 2025 10:16 )    Color: x / Appearance: x / SG: x / pH: x  Gluc: 157 mg/dL / Ketone: x  / Bili: x / Urobili: x   Blood: x / Protein: x / Nitrite: x   Leuk Esterase: x / RBC: x / WBC x   Sq Epi: x / Non Sq Epi: x / Bacteria: x        RADIOLOGY & ADDITIONAL TESTS:    Imaging Personally Reviewed:    Consultant(s) Notes Reviewed:      Care Discussed with Consultants/Other Providers:

## 2025-03-30 NOTE — PROGRESS NOTE ADULT - ASSESSMENT
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 He is well known to me from multiple recent admisions  s/p admissions at [facility] 12/20-12/25/24 and 2/4-2/7 with SONDRA on CKD with associated uremic symptoms.   At the last admission he had expressed strong interest to try to hold off with HD intiation but he has been getting worse clinically at home.  His SONDRA and uremic symptoms significantly improved after the first admission; they improved a to mild extent during the 2nd admission to the point where he was able to be discharged without HD. Since then, however, he has worsened further.   He has been suffering from progressively worsening diffuse pruritus, loss of appetite, and generalized weakness and falls.   His nephrologist and PCP has been speaking with him and his family over the past few days,   The initial plan was that he would present to the [facility] ER Monday 2/17 (ie tomorrow) for HD initiation. Today, however, he fell and sustained trauma to his knee. Given the fall and concern for knee fracture, he presented to the ER today. multiple xrays show no Fractures.      CKD5, uremia, requiring initiation of HD  Rash, seborrheic dermatitis  Pruritis  Anemia  PAD  SP catheter, chronic  Left inferior pole acute on chronic patella Fx    plan  - HD via R subclavian permacath  -3/30: Tmax 100.9, c/o dry cough and diarrhea but not watery. pain is controlled at the time of visit. wife at bedside. will check RVP panel, GI PCR, sine diarrhea non watery will not order C. Diff. ID to follow  - 3/29: cont meropenem, seen by coverage  - 3/28: pain from decubitus ulcer and Left knee post debridement continues. wound care recs in place. on Meropenem for PNA, pulm and ID following. HD as per renal. HDS  -3/27: ptn is complaining that pain post decubital and left knee debridement has been severe. his daughter is on speaker phone, wife is at bedside. his pain is controlled when meds are administered. he is very uncomfortable due to the pain at the site of decubitus debridement. lyrica helped his pain before but made him drowsy, griffin with gabapentin. he doesnt want them to be resumed. will cont dilaudid, oxycodone, oxycontin, ibuprofen. he is on Meropenem for aspiration PNA/HCAP. [facility] for DVT ppx. seen by vascular cardiology to address R IJ post shiley non occlusive DVT. full AC was not recommended. will check rpt doppler in 3-4 weeks.   - 3/26: Wound care performed bedside debridement of Left knee wound 2/2 lifted eschar, and sacral decubitus. findings c/w possible infection and mainly fat necrosis. cont Meropenem. ID following. podiatry following for gangrenous toes. will give additional single dose IV DIlaudid 2/2 severe pain post debridement.    - 3/25: wife at bedside, daughter on speaker phone at bedside. ptn is pain free at the time of visit and states he wants to cont the pain regiment he is presently on. ct C/A/P revealed: RLL PNA, prob aspiration, stercoral proctitis, decubitus ulcer w possible progression to sacral OM, R IJ nonocclusive DVT. these findings d/w ptn ,his family, wound care, ACP, Vascular, Nephrology, ID, pUlm, GI. meropenem initiated, Bactrim stopped. ptn states when he is cleared for DC, he wants to go home , not to [facility], not to SNF. daughter and wife aware.   -3/24: ptn  was seen by GI for N/V, its not clear the etiology, will obtain Ct C/A/P. ptn states he is coughing up green mucus as well  -3/23:  ptn is in good spirits, says he vomited "spit" this am, but tolerated all meals without vomiting. no abd pain, no undigested food in the vomit. afebrile, no diarrhea, RVP neg. GI consulted.   -3/21-22: ptn feels well.  pain is controlled. still no accepting facility for [facility]  -3/20: ptn states he has severe low back and LLE pain though overall is improving.   -3/19: ptn w tan crusting around SPC , states its painful. started on po Bactrim, renally dosed by ID for cellulitis.   -3/18: LLE paraesthesias are chronic, will d/w CM re dc planning to [facility]    -3/17: ptn states he is lethargic, he has LLE numbness which is new and severe pain at SPC insertion site. this was ID, d/w neuro, renal and vascular. as per ID: no sign of an acute infection recommend CT A/P.   -3/16:  urine cx from 3/15 NTD, zosyn DCed, awaiting dc to [facility], not sure will be able to go to [facility] since there is an insurance coverage conflict. HD as per renal  - 3/15: spoke w ptn's daughter today re denial from [employer] for [facility]. ptn has [employer] medicare advantage plan, which [employer] doesnt accept. i recommended to speak  to Cm and to the insurance company to find participating facilities. ptn is stable today. pain and erythema at SP catheter site has resolved today. seen by ID, will cont ZOSYN for now. UCx sent.   - 3/14: suprapubic tube changhed by BETTY, ptn has crusting at the insertion site and pain. will start Abx, urine always has sediment, will sent for cx, start Vanco/ZOsyn. ID consult called. check MRSA/MSSA PCR  -3/13: ptn is doing well. ptn has an accepting rehab facility, now pending AUTH.   -3/12: ptn is no longer constipated. doing well off prn IV DIlaudid. awaiting dc to [facility]  -3/11: ptn has no new c/o other than feeling constipated, lactulose ordered. also in preparation for [facility] will dc prn IV DIlaudid, cont prn OXy IR  -3/10:  ptn is awake, alert, wife at bedside, i instructed them to give rehab choices. also awaiting SPC change to be done by urology. pain is controlled  -3/9: seen by PT, will refer to [facility]. choices to be given in AM. this was d/w ptn, daughter, wife.   -3/8:  ptn didnt want to get PT eval done, will reorder. ptn needs [facility] placement. PMNR consult ordered  - 3/7: ptn is alert , pain is controlled, DC planning d/w ptn and HCP, daughter Yanique  -3/6:  ptn is awake, alert, ecchymotic feet look improved, pain is controlled. DC planning to [facility]  3/4-5 - s/p b/l iliac arteries angioplasty and stent placements on 3/3. today has new ecchymoses in b/l LE, concern for arterial insufficiency. vascular aware.. wound from Left knee immobilizer is dressed and healing. pain is controlled.   LEFT UE AVF placement/scheduling will be on outptn basis as per vascular. dc planning to [facility]  - 3/3: ptn is s/p angiogram RLE : b/l iliac arteries w successful angioplasty and stents. in PACU. awaitng HD.  ptn has a pressure wound from the LLE immobilizer posteriorly proximal to the knee. its been on 2/2 knee fracture, applied by ortho and managed by ptn's wife as per ptn's and wife's request , this was d/w nursing and nurse manager ms Ruiz.. immobilizer should be managed by orthopedics and nursing. will keep it off, only keep on when repositioning for care and then remove. wound care to F/U . this was discussed at length w daughter Yanique and wife Dee Dee.   -3/1-3/2: ptn is smiling, pain free, had HD 2/28 and 3/1, awaiting RLE angiogram 3/3, on Heparin drip, euvolemic  -2/28: ptn is lethargic, awaiting RLE angiogram possible fem-fem bypass hopefully on 3/3 pending OR availability, awaiting HD today    -2/27:  plan for angiogram in am with possible angioplasty, possible stent, possible fem-fem bypass. medically cleared for angiogram and possible surgery, stent, angioplasty. pain is controlled. remains on heparin drip as per vascular. HD as per renal    -2/26:  ptn is sleeping, pain is controlled, he was seen by wound care and vascular attending. planning on angiogram 2/2 severe PAD in LE on CTA. he also spoke to HCP. ptn and HCP in agreement. ptn was started on HEPARIN drip as per vascular recs. RIJ permacath done 2/25    - 2/25: ptn states he is hallucinating, but not at present, his MS is at baseline, his pain is controlled, he still needs prn pain meds when he is moved in the bed or for testing or for HD. awaiting Permacath, AVF creation, LE bypass to be d/w Dr. Swenson and the ptn tomorrow. ptn has cardiology clearance  if he opts for. Ptn has sacral decubiti and posterior thigh and buttock decibiti and b/l heel decubiti. Z flow bootie d/w RN, get wound care consult    -2/24: pain is controlled  if the LLE is immobile and fully extended. doesn't tolerate the knee immobilizer. has a medium condyle and acute on chronic patella fx in LEFT knee. as per vascular ptn will need iliac stent  w a fem-fem bypass, possible axillo-femoral bypass. for this surgery he would need cardiac work up . more pressing surgery is LUE AVF creation,  in IR will arrange for Permacath. for pain control: Motrin 400 mg q12H, Oxy ER 30 mg q12H, Oxy IR 10 for mod pain and dilaudid 2 mg iv for severe pain. still has pruritis though improved, will raise atarax 25 mg to tid. plan of care d/w daughter Norma Persaud , ptn and his wife. Also d/w renal, card, vascular, ACP    -2/23: Daughter Yanique is the HCP, she and the ptn filled out the paperwork. daily plan and findings d/w her and the ptn. MS is at abseline, c/o b/l LE foot pain and Left Knee pain. xrays of left knee: cannot r/o acute on chronic inferior pole patellar Fx. knee immobilizer is on, awaiting ortho consult. doubt needs any intervention awaiting Ct chest today, will add CT Left knee. ptn states itching has recurred, severe pain has recurred. will resume Atatrax ( 25 mg bid) and Oxycodone ER , but at a lower dose 15 mg q12H. cont prn analgesics. HD as per renal, awaiting Vascular consult f/u re CTA A/L &LE and recs. ptn also wants AVF surgery on this admission. Coughing has stopped    - 2/22: ptn is drowsy but arousable, calmer today, answers questions appropriately. he is pain free, he stopped coughing, he denies having pruritis: will DC:  OXY ER, Atarax, Tessalon perles.     -  2/21: ptn is tearful, a bit confused, coughing, recognizes me and family at bedside. GOC d/w daughter and wife. AMS prob delirium 2/2 acute illness +/- opiods, lyrica, atarac. will lower atarak to tid, dc lyrica, cont Oxy 2/2 ptn has severe LE pain. neuro called for eval. Head ct done yesterday w no acute findings. CTA A/P w LE run fof: severe PAD, vascular to follow up on plan fo care. plan for HD tomorrow and next week place on tiw schedule of MWF. will need tunneled catheter prior to DC and will d/w vascular scheduling of AVF. ptn wants all to be done while inptn. daughter wants to be HCP as per ptn's wishes. she was given paperwork to get it signed and witnessed and will present to nursing thereafter. details of findings and complaints and plan of care d/w daughter and wife. spent 60 min. RVP ordered. start mucinex , tessalon perles, duonebs, get CT chest to r/o PNA. pulm called    -  2/20:  ptn had RRT 2/2 AMS and tremors. no SZ, no LOC. noted to have Na 123( hyponatremia), given 500 cc NS. Gabapentin DCed. ptn had been taking gabapentin at home PTA. Pain is controlled. Pruritis resolved, tolerating HD otherwise.     - Pain management:  cont Oxycodone 10 IR q6H,  DIlaudid prn severe pain 2 mg q4H prn.   - cont outptn meds  - DVT ppx w HSC

## 2025-03-30 NOTE — PROGRESS NOTE ADULT - SUBJECTIVE AND OBJECTIVE BOX
Subjective: Patient seen and examined. No new events except as noted.     REVIEW OF SYSTEMS:    CONSTITUTIONAL: +weakness, fevers or chills  EYES/ENT: No visual changes;  No vertigo or throat pain   NECK: No pain or stiffness  RESPIRATORY: No cough, wheezing, hemoptysis; No shortness of breath  CARDIOVASCULAR: No chest pain or palpitations  GASTROINTESTINAL: No abdominal or epigastric pain. No nausea, vomiting, or hematemesis; No diarrhea or constipation. No melena or hematochezia.  GENITOURINARY: No dysuria, frequency or hematuria  NEUROLOGICAL: No numbness or weakness  SKIN: No itching, burning, rashes, or lesions   All other review of systems is negative unless indicated above.    MEDICATIONS:  MEDICATIONS  (STANDING):  albuterol/ipratropium for Nebulization 3 milliLiter(s) Nebulizer every 6 hours  aspirin  chewable 81 milliGRAM(s) Oral daily  atorvastatin 40 milliGRAM(s) Oral at bedtime  chlorhexidine 2% Cloths 1 Application(s) Topical daily  clopidogrel Tablet 75 milliGRAM(s) Oral daily  Dakins Solution - 1/4 Strength 1 Application(s) Topical every 8 hours  epoetin aleah-epbx (RETACRIT) Injectable 91241 Unit(s) IV Push <User Schedule>  heparin   Injectable 5000 Unit(s) SubCutaneous every 8 hours  hydrOXYzine hydrochloride 25 milliGRAM(s) Oral three times a day  ibuprofen  Tablet. 400 milliGRAM(s) Oral every 12 hours  lactobacillus acidophilus 1 Tablet(s) Oral two times a day with meals  meropenem  IVPB 500 milliGRAM(s) IV Intermittent every 12 hours  mupirocin 2% Ointment 1 Application(s) Topical <User Schedule>  Nephro-margaret 1 Tablet(s) Oral daily  oxyCODONE  ER Tablet 30 milliGRAM(s) Oral every 12 hours  pantoprazole    Tablet 40 milliGRAM(s) Oral before breakfast  polyethylene glycol 3350 17 Gram(s) Oral daily  povidone iodine 10% Solution 1 Application(s) Topical two times a day  senna 2 Tablet(s) Oral at bedtime      PHYSICAL EXAM:  T(C): 36.8 (03-30-25 @ 08:50), Max: 38.3 (03-30-25 @ 00:20)  HR: 85 (03-30-25 @ 08:50) (60 - 103)  BP: 109/65 (03-30-25 @ 08:50) (109/65 - 123/65)  RR: 18 (03-30-25 @ 08:50) (18 - 18)  SpO2: 93% (03-30-25 @ 08:50) (92% - 95%)  Wt(kg): --  I&O's Summary        Appearance: NAD  HEENT:  Dry oral mucosa, PERRL, EOMI	  Lymphatic: No lymphadenopathy  Cardiovascular: Normal S1 S2, No JVD, No murmurs, No edema  Respiratory: Lungs clear to auscultation	  Psychiatry: A & O x 3, Mood & affect appropriate  Skin: No rashes, No ecchymoses, No cyanosis	  Neurologic: Non-focal  GI/Abd: Soft, obese, nontender. Dressing to LLQ C/D/I. Suprapubic catheter in place  Ext: Palp femoral pulses bilat. BLE atrophic, minimal dorsi/plantarflexion bilaterally. Sensation grossly intact. LLE edematous compared to R, erythema to right toes/forefoot. mottling of right toes/forefoot/heel  LLE:  small superficial abrasion, +edema and +ecchymosis over L knee  +TTP over L knee, no TTP along remainder of extremity; compartments soft  Limited ROM at knee 2/2 pain  No gross varus/valgus laxity, but assessment limited 2/2 pain  Motor: TA/EHL/GS/FHL intact  Sensory: DP/SP/Tib/Jordan/Saph SILT  +DP pulse (symmetric relative to contralateral side), WWP   pressure wound from the LLE immobilizer posteriorly proximal to the knee.  Vascular: Peripheral pulses palpable 2+ bilaterally      LABS:    CARDIAC MARKERS:                                9.3    8.20  )-----------( 293      ( 28 Mar 2025 10:23 )             31.3     03-29    136  |  101  |  19  ----------------------------<  157[H]  3.8   |  23  |  2.52[H]    Ca    7.6[L]      29 Mar 2025 10:16      proBNP:   Lipid Profile:   HgA1c:   TSH:             TELEMETRY: 	    ECG:  	  RADIOLOGY:   DIAGNOSTIC TESTING:  [ ] Echocardiogram:  [ ]  Catheterization:  [ ] Stress Test:    OTHER:

## 2025-03-30 NOTE — PROGRESS NOTE ADULT - SUBJECTIVE AND OBJECTIVE BOX
INTERVAL HPI/OVERNIGHT EVENTS:    no GI complaints          acetaminophen     Tablet .. 650 milliGRAM(s) Oral every 6 hours PRN  albuterol/ipratropium for Nebulization 3 milliLiter(s) Nebulizer every 6 hours  aspirin  chewable 81 milliGRAM(s) Oral daily  atorvastatin 40 milliGRAM(s) Oral at bedtime  benzocaine/menthol Lozenge 1 Lozenge Oral three times a day PRN  bisacodyl 5 milliGRAM(s) Oral every 12 hours PRN  chlorhexidine 2% Cloths 1 Application(s) Topical daily  clopidogrel Tablet 75 milliGRAM(s) Oral daily  Dakins Solution - 1/4 Strength 1 Application(s) Topical every 8 hours  epoetin aleah-epbx (RETACRIT) Injectable 10652 Unit(s) IV Push <User Schedule>  heparin   Injectable 5000 Unit(s) SubCutaneous every 8 hours  HYDROmorphone  Injectable 2 milliGRAM(s) IV Push once PRN  HYDROmorphone  Injectable 2 milliGRAM(s) IV Push every 4 hours PRN  hydrOXYzine hydrochloride 25 milliGRAM(s) Oral three times a day  ibuprofen  Tablet. 400 milliGRAM(s) Oral every 12 hours  lactobacillus acidophilus 1 Tablet(s) Oral two times a day with meals  meropenem  IVPB 500 milliGRAM(s) IV Intermittent every 12 hours  mupirocin 2% Ointment 1 Application(s) Topical <User Schedule>  Nephro-margaret 1 Tablet(s) Oral daily  oxyCODONE    IR 10 milliGRAM(s) Oral every 4 hours PRN  oxyCODONE  ER Tablet 30 milliGRAM(s) Oral every 12 hours  pantoprazole    Tablet 40 milliGRAM(s) Oral before breakfast  polyethylene glycol 3350 17 Gram(s) Oral daily  povidone iodine 10% Solution 1 Application(s) Topical two times a day  senna 2 Tablet(s) Oral at bedtime  sodium chloride 0.9% lock flush 10 milliLiter(s) IV Push every 1 hour PRN                            9.3    8.20  )-----------( 293      ( 28 Mar 2025 10:23 )             31.3       Hemoglobin: 9.3 g/dL (03-28 @ 10:23)      03-29    136  |  101  |  19  ----------------------------<  157[H]  3.8   |  23  |  2.52[H]    Ca    7.6[L]      29 Mar 2025 10:16      Creatinine Trend: 2.52<--, 3.21<--, 4.04<--, 3.43<--, 2.84<--, 2.78<--    COAGS:           T(C): 36.4 (03-28-25 @ 23:56), Max: 36.4 (03-28-25 @ 11:14)  HR: 81 (03-28-25 @ 23:56) (81 - 85)  BP: 127/61 (03-28-25 @ 23:56) (113/54 - 142/63)  RR: 18 (03-28-25 @ 23:56) (18 - 18)  SpO2: 95% (03-28-25 @ 23:56) (95% - 96%)  Wt(kg): --    I&O's Summary    28 Mar 2025 07:01  -  29 Mar 2025 07:00  --------------------------------------------------------  IN: 0 mL / OUT: 1600 mL / NET: -1600 mL      Allergies    No Known Allergies    Intolerances        Review of Systems:    General:  No wt loss, fevers, chills, night sweats, fatigue   Eyes:  Good vision, no reported pain  ENT:  No sore throat, pain, runny nose, dysphagia  CV:  No pain, palpitations, hypo/hypertension  Resp:  No dyspnea, cough, tachypnea, wheezing  GI:  No pain, No nausea, No vomiting, No diarrhea, No constipation, No weight loss, No fever, No pruritis, No rectal bleeding, No melena, No dysphagia  :  No pain, bleeding, incontinence, nocturia  Muscle:  No pain, weakness  Neuro:  No weakness, tingling, memory problems  Psych:  No fatigue, insomnia, mood problems, depression  Endocrine:  No polyuria, polydypsia, cold/heat intolerance  Heme:  No petechiae, ecchymosis, easy bruisability  Skin:  No rash, tattoos, scars, edema       PHYSICAL EXAM:    Constitutional: NAD  HEENT: EOMI, throat clear  Neck: No LAD, supple  Respiratory: CTA and P  Cardiovascular: S1 and S2, RRR, no M  Gastrointestinal: BS+, soft, NT/ND, neg HSM,  Extremities: No peripheral edema, neg clubbing, cyanosis  Vascular: 2+ peripheral pulses  Neurological: A/O   Psychiatric: Normal mood, normal affect  Skin: No rashes

## 2025-03-30 NOTE — PROGRESS NOTE ADULT - ASSESSMENT
74 year old man with SONDRA on CKD requiring HD, now with nausea    1. ESRD on HD (new)     2. Nausea/dyspepsia  - improved  -favor r/t newly started HD  -PPI daily for GERD   -tolerating diet    3. Proctitis d/t constipation which has since resolved   -maintain on senna qhs with Miralax twice daily   -enema as needed     4. Decubitus Ulcer   CT noted, f/u primary, ID and wound care teams

## 2025-03-30 NOTE — PROGRESS NOTE ADULT - ASSESSMENT
on room air  afebrile  overnight events noted  denies any acute complaints at present  For HD Monday 3/31/25    acetaminophen     Tablet .. 650 milliGRAM(s) Oral every 6 hours PRN  albuterol/ipratropium for Nebulization 3 milliLiter(s) Nebulizer every 6 hours  aspirin  chewable 81 milliGRAM(s) Oral daily  atorvastatin 40 milliGRAM(s) Oral at bedtime  benzocaine/menthol Lozenge 1 Lozenge Oral three times a day PRN  bisacodyl 5 milliGRAM(s) Oral every 12 hours PRN  chlorhexidine 2% Cloths 1 Application(s) Topical daily  clopidogrel Tablet 75 milliGRAM(s) Oral daily  Dakins Solution - 1/4 Strength 1 Application(s) Topical every 8 hours  epoetin aleah-epbx (RETACRIT) Injectable 89277 Unit(s) IV Push <User Schedule>  heparin   Injectable 5000 Unit(s) SubCutaneous every 8 hours  HYDROmorphone  Injectable 2 milliGRAM(s) IV Push every 4 hours PRN  hydrOXYzine hydrochloride 25 milliGRAM(s) Oral three times a day  ibuprofen  Tablet. 400 milliGRAM(s) Oral every 12 hours  lactobacillus acidophilus 1 Tablet(s) Oral two times a day with meals  meropenem  IVPB 500 milliGRAM(s) IV Intermittent every 12 hours  mupirocin 2% Ointment 1 Application(s) Topical <User Schedule>  Nephro-margaret 1 Tablet(s) Oral daily  oxyCODONE    IR 10 milliGRAM(s) Oral every 4 hours PRN  oxyCODONE  ER Tablet 30 milliGRAM(s) Oral every 12 hours  pantoprazole    Tablet 40 milliGRAM(s) Oral before breakfast  polyethylene glycol 3350 17 Gram(s) Oral daily  povidone iodine 10% Solution 1 Application(s) Topical two times a day  senna 2 Tablet(s) Oral at bedtime  sodium chloride 0.9% lock flush 10 milliLiter(s) IV Push every 1 hour PRN      VITAL:  T(C): , Max: 38.3 (03-30-25 @ 00:20)  T(F): , Max: 100.9 (03-30-25 @ 00:20)  HR: 85 (03-30-25 @ 08:50)  BP: 109/65 (03-30-25 @ 08:50)  BP(mean): --  RR: 18 (03-30-25 @ 08:50)  SpO2: 93% (03-30-25 @ 08:50)  Wt(kg): --      PHYSICAL EXAM:  Constitutional: alert, NAD  HEENT: NCAT, DMM  Neck: Supple, No JVD  Respiratory: CTA-b/l  Cardiovascular: RRR s1s2, no m/r/g  Gastrointestinal: BS+, soft, NT/ND  : (+)suprapubic cath  Extremities: 1+ b/l LE edema  Neurological: reduced generalized strength  Back: no CVAT b/l  Skin: (+)cyanotic changes b/l feet  Access: RIJ tunneled cath (accessed)   LABS:      Na(136)/K(3.8)/Cl(101)/HCO3(23)/BUN(19)/Cr(2.52)Glu(157)/Ca(7.6)/Mg(--)/PO4(--)    03-29 @ 10:16  Na(129)/K(4.2)/Cl(94)/HCO3(24)/BUN(30)/Cr(3.21)Glu(124)/Ca(7.5)/Mg(--)/PO4(--)    03-28 @ 10:24    Urinalysis Basic - ( 29 Mar 2025 10:16 )    Color: x / Appearance: x / SG: x / pH: x  Gluc: 157 mg/dL / Ketone: x  / Bili: x / Urobili: x   Blood: x / Protein: x / Nitrite: x   Leuk Esterase: x / RBC: x / WBC x   Sq Epi: x / Non Sq Epi: x / Bacteria: x              IMPRESSION: 74M w/ polio, neurogenic bladder/suprapubic catheter, 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 s/p HD Friday with 1.5UF, subsequent  HD Monday    (2)Hyponatremia - Na+ improving(3/29/25)  s/p  high-Na+ bath with HD    (3)Anemia - s/p IV iron; on Retacrit with HD    (4)PAD - s/p LE bypass 3/3/25 - cyanotic changes of toes b/l - for further management after discharge    (5)CV - multifactorial LE edema; improving with increasing intradialytic UF, and with improving nutritional parameters    (6)Neuro - reduced generalized strength - getting PT/awaiting [facility]    RECOMMEND:   (1)Next  HD Monday 3/31/25        Zaid Ricci NP-BC  [facility]  [phone]

## 2025-03-31 NOTE — PROGRESS NOTE ADULT - ASSESSMENT
74 year old male with CKD, sp polio, paraplegia with associated neurogenic bladder s/p suprapubic catheter who was admitted s/p fall on 2/16.   CKD - ESRD, now s/p DEYA [place] 2/17, on HD  ruled out cellulitis around suprapubic cath  2/20 s/p RRT for tremors and confusion -- pt with chronic tremors although notably worse  2/20 CXR with clear lungs   SPC site with chronic/stable inflammatory changes, no drainage - no sign of SSTI  CTA abd with occlusion of b/l external iliac arteries and b/l superficial femoral arteries with distal reconstitution at popliteal arteries; patent three vessel runoff of RLE with 2 vessel runoff of LLE with occlusion of L mid anterior tibial artery with distal reconstitution  Vascular following for worsening PAD with worsening ischemic changes   -3/3 s/p RLE angiogram b/l iliac artery stents   mental status at baseline    3/16 no tenderness at suprapubic catheter site, no visible erythema at catheter site on exam  UA w/ many epithelial cells, Ucx negative  s/p zosyn 3/14-3/16  3/19 SPC site remains without erythema but now noted with some tan crusting on dressing  3/23 reporting vomiting x5 episodes, no diarrhea or other focal sx  Treated for possible SPC site infection with bactrim 3/19-3/25  3/25 CT Chest noted RLL consolidative opacities, LLL opacities - pneumonia vs atelectasis, few ill defined nodular opacities in LLL infectious vs inflammatory  3/25 CTAP with subcutaneous gas and infiltration tracking along medial R buttock, possibly related to decubitus wound with gas tracking from the external environment, possible necrotizing air forming infection; rectal wall thickening suggestive of proctitis; b/l mild hydroureteronephrosis to level of bladder with no obstruction and diffuse urothelial thickening of b/l renal collecting systems and ureters similar to 2/20  MRSA screen has been negative   pt with no resp symptoms- no cough, dyspnea or pain, may have aspirated with vomiting recently, saturating well on RA  3/26 s/p bedside debridement of L knee wound  as dry eschar  from wound edges without drainage and bedside excisional debridement of unstageable sacrum to left buttock into perineum evolving unstageable pressure injury through necrotic dermis into nonviable subcutaneous tissues, debulking debridement of necrotic tissue done by Wound care; other wounds noted including L calf to ankle wound with liquefaction necrotic drainage; R ischium wound with moist pink granular tissue and L buttock fading DTI  3/30 overnight febrile to 100.9F ?inflammatory -- now afebrile x24h, WBC wnl, remains on meropenem   dry cough noted, COVID/Flu/RSV negative;   loose stools per pt after bowel regimen, some nausea with no vomiting     Recommendations:   Send GI stool PCR if any further loose stools  Continue meropenem (renally dosed) based on prior cultures  -given location of wounds, patient at risk of contamination, further skin/wound breakdown due to pressure 2/2 being bedbound and prolonged course of antibiotics unlikely to resolve this and will increase risk of C.diff, development of resistant organisms making treatment of infection more difficult in the future in addition to antibiotic adverse effects, therefore, recommend short course 10d course until 4/3/25   With continued local wound care, nutrition, offloading as able  HD per renal    Monitor temps/WBC  SPC site dressing change and care   Continue rest of care per primary team       Bridgette Ramirez M.D.  [facility]  Available on [employer] TEAMS - *PREFERRED*  [phone]  After 5pm on weekdays and all day on weekends - please call [phone]     Thank you for consulting us and involving us in the management of this patients case. In addition to reviewing history, imaging, documents, labs, microbiology, took into account antibiotic stewardship, local antibiogram and infection control strategies and potential transmission issues at time of treatment decision making process.

## 2025-03-31 NOTE — PROGRESS NOTE ADULT - ASSESSMENT
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 He is well known to me from multiple recent admisions  s/p admissions at [facility] 12/20-12/25/24 and 2/4-2/7 with SONDRA on CKD with associated uremic symptoms.   At the last admission he had expressed strong interest to try to hold off with HD intiation but he has been getting worse clinically at home.  His SONDRA and uremic symptoms significantly improved after the first admission; they improved a to mild extent during the 2nd admission to the point where he was able to be discharged without HD. Since then, however, he has worsened further.   He has been suffering from progressively worsening diffuse pruritus, loss of appetite, and generalized weakness and falls.   His nephrologist and PCP has been speaking with him and his family over the past few days,   The initial plan was that he would present to the [facility] ER Monday 2/17 (ie tomorrow) for HD initiation. Today, however, he fell and sustained trauma to his knee. Given the fall and concern for knee fracture, he presented to the ER today. multiple xrays show no Fractures.      CKD5, uremia, requiring initiation of HD  Rash, seborrheic dermatitis  Pruritis  Anemia  PAD  SP catheter, chronic  Left inferior pole acute on chronic patella Fx    plan  - HD via R subclavian permacath  -3/31: at HD today had 1.7 L UF, post HD was hypotense, gave gentle IVF , BP still a bit low. afebrile, loose BMs but not diarrhea, GI PCR not sent, RVP neg. . on Meropenem course for total of 10 days, last day 4/3, would care for decubiti and left knee wound post trauma  -3/30: Tmax 100.9, c/o dry cough and diarrhea but not watery. pain is controlled at the time of visit. wife at bedside. will check RVP panel, GI PCR, sine diarrhea non watery will not order C. Diff. ID to follow  - 3/29: cont meropenem, seen by coverage  - 3/28: pain from decubitus ulcer and Left knee post debridement continues. wound care recs in place. on Meropenem for PNA, pulm and ID following. HD as per renal. HDS  -3/27: ptn is complaining that pain post decubital and left knee debridement has been severe. his daughter is on speaker phone, wife is at bedside. his pain is controlled when meds are administered. he is very uncomfortable due to the pain at the site of decubitus debridement. lyrica helped his pain before but made him drowsy, griffin with gabapentin. he doesnt want them to be resumed. will cont dilaudid, oxycodone, oxycontin, ibuprofen. he is on Meropenem for aspiration PNA/HCAP. [facility] for DVT ppx. seen by vascular cardiology to address R IJ post shiley non occlusive DVT. full AC was not recommended. will check rpt doppler in 3-4 weeks.   - 3/26: Wound care performed bedside debridement of Left knee wound 2/2 lifted eschar, and sacral decubitus. findings c/w possible infection and mainly fat necrosis. cont Meropenem. ID following. podiatry following for gangrenous toes. will give additional single dose IV DIlaudid 2/2 severe pain post debridement.    - 3/25: wife at bedside, daughter on speaker phone at bedside. ptn is pain free at the time of visit and states he wants to cont the pain regiment he is presently on. ct C/A/P revealed: RLL PNA, prob aspiration, stercoral proctitis, decubitus ulcer w possible progression to sacral OM, R IJ nonocclusive DVT. these findings d/w ptn ,his family, wound care, ACP, Vascular, Nephrology, ID, pUlm, GI. meropenem initiated, Bactrim stopped. ptn states when he is cleared for DC, he wants to go home , not to [facility], not to SNF. daughter and wife aware.   -3/24: ptn  was seen by GI for N/V, its not clear the etiology, will obtain Ct C/A/P. ptn states he is coughing up green mucus as well  -3/23:  ptn is in good spirits, says he vomited "spit" this am, but tolerated all meals without vomiting. no abd pain, no undigested food in the vomit. afebrile, no diarrhea, RVP neg. GI consulted.   -3/21-22: ptn feels well.  pain is controlled. still no accepting facility for [facility]  -3/20: ptn states he has severe low back and LLE pain though overall is improving.   -3/19: ptn w tan crusting around SPC , states its painful. started on po Bactrim, renally dosed by ID for cellulitis.   -3/18: LLE paraesthesias are chronic, will d/w CM re dc planning to [facility]    -3/17: ptn states he is lethargic, he has LLE numbness which is new and severe pain at SPC insertion site. this was ID, d/w neuro, renal and vascular. as per ID: no sign of an acute infection recommend CT A/P.   -3/16:  urine cx from 3/15 NTD, zosyn DCed, awaiting dc to [facility], not sure will be able to go to [facility] since there is an insurance coverage conflict. HD as per renal  - 3/15: spoke w ptn's daughter today re denial from [facility] for [facility]. ptn has [employer] medicare advantage plan, which [facility] doesnt accept. i recommended to speak  to Cm and to the insurance company to find participating facilities. ptn is stable today. pain and erythema at SP catheter site has resolved today. seen by ID, will cont ZOSYN for now. UCx sent.   - 3/14: suprapubic tube changhed by BETTY, ptn has crusting at the insertion site and pain. will start Abx, urine always has sediment, will sent for cx, start Vanco/ZOsyn. ID consult called. check MRSA/MSSA PCR  -3/13: ptn is doing well. ptn has an accepting rehab facility, now pending AUTH.   -3/12: ptn is no longer constipated. doing well off prn IV DIlaudid. awaiting dc to [facility]  -3/11: ptn has no new c/o other than feeling constipated, lactulose ordered. also in preparation for [facility] will dc prn IV DIlaudid, cont prn OXy IR  -3/10:  ptn is awake, alert, wife at bedside, i instructed them to give rehab choices. also awaiting SPC change to be done by urology. pain is controlled  -3/9: seen by PT, will refer to [facility]. choices to be given in AM. this was d/w ptn, daughter, wife.   -3/8:  ptn didnt want to get PT eval done, will reorder. ptn needs [facility]DIA placement. PMNR consult ordered  - 3/7: ptn is alert , pain is controlled, DC planning d/w ptn and HCP, daughter Yanique  -3/6:  ptn is awake, alert, ecchymotic feet look improved, pain is controlled. DC planning to [facility]  3/4-5 - s/p b/l iliac arteries angioplasty and stent placements on 3/3. today has new ecchymoses in b/l LE, concern for arterial insufficiency. vascular aware.. wound from Left knee immobilizer is dressed and healing. pain is controlled.   LEFT UE AVF placement/scheduling will be on outptn basis as per vascular. dc planning to [facility]  - 3/3: ptn is s/p angiogram RLE : b/l iliac arteries w successful angioplasty and stents. in PACU. awaitng HD.  ptn has a pressure wound from the LLE immobilizer posteriorly proximal to the knee. its been on 2/2 knee fracture, applied by ortho and managed by ptn's wife as per ptn's and wife's request , this was d/w nursing and nurse manager ms Ruiz.. immobilizer should be managed by orthopedics and nursing. will keep it off, only keep on when repositioning for care and then remove. wound care to F/U . this was discussed at length w daughter Yanique and wife Dee Dee.   -3/1-3/2: ptn is smiling, pain free, had HD 2/28 and 3/1, awaiting RLE angiogram 3/3, on Heparin drip, euvolemic  -2/28: ptn is lethargic, awaiting RLE angiogram possible fem-fem bypass hopefully on 3/3 pending OR availability, awaiting HD today    -2/27:  plan for angiogram in am with possible angioplasty, possible stent, possible fem-fem bypass. medically cleared for angiogram and possible surgery, stent, angioplasty. pain is controlled. remains on heparin drip as per vascular. HD as per renal    -2/26:  ptn is sleeping, pain is controlled, he was seen by wound care and vascular attending. planning on angiogram 2/2 severe PAD in LE on CTA. he also spoke to HCP. ptn and HCP in agreement. ptn was started on HEPARIN drip as per vascular recs. RIJ permacath done 2/25    - 2/25: ptn states he is hallucinating, but not at present, his MS is at baseline, his pain is controlled, he still needs prn pain meds when he is moved in the bed or for testing or for HD. awaiting Permacath, AVF creation, LE bypass to be d/w Dr. Swenson and the ptn tomorrow. ptn has cardiology clearance  if he opts for. Ptn has sacral decubiti and posterior thigh and buttock decibiti and b/l heel decubiti. Z flow bootie d/w RN, get wound care consult    -2/24: pain is controlled  if the LLE is immobile and fully extended. doesn't tolerate the knee immobilizer. has a medium condyle and acute on chronic patella fx in LEFT knee. as per vascular ptn will need iliac stent  w a fem-fem bypass, possible axillo-femoral bypass. for this surgery he would need cardiac work up . more pressing surgery is LUE AVF creation,  in IR will arrange for Permacath. for pain control: Motrin 400 mg q12H, Oxy ER 30 mg q12H, Oxy IR 10 for mod pain and dilaudid 2 mg iv for severe pain. still has pruritis though improved, will raise atarax 25 mg to tid. plan of care d/w daughter Norma Persaud , ptn and his wife. Also d/w renal, card, vascular, ACP    -2/23: Daughter Yanique is the HCP, she and the ptn filled out the paperwork. daily plan and findings d/w her and the ptn. MS is at abseline, c/o b/l LE foot pain and Left Knee pain. xrays of left knee: cannot r/o acute on chronic inferior pole patellar Fx. knee immobilizer is on, awaiting ortho consult. doubt needs any intervention awaiting Ct chest today, will add CT Left knee. ptn states itching has recurred, severe pain has recurred. will resume Atatrax ( 25 mg bid) and Oxycodone ER , but at a lower dose 15 mg q12H. cont prn analgesics. HD as per renal, awaiting Vascular consult f/u re CTA A/L &LE and recs. ptn also wants AVF surgery on this admission. Coughing has stopped    - 2/22: ptn is drowsy but arousable, calmer today, answers questions appropriately. he is pain free, he stopped coughing, he denies having pruritis: will DC:  OXY ER, Atarax, Tessalon perles.     -  2/21: ptn is tearful, a bit confused, coughing, recognizes me and family at bedside. GOC d/w daughter and wife. AMS prob delirium 2/2 acute illness +/- opiods, lyrica, atarac. will lower atarak to tid, dc lyrica, cont Oxy 2/2 ptn has severe LE pain. neuro called for eval. Head ct done yesterday w no acute findings. CTA A/P w LE run fof: severe PAD, vascular to follow up on plan fo care. plan for HD tomorrow and next week place on tiw schedule of MWF. will need tunneled catheter prior to DC and will d/w vascular scheduling of AVF. ptn wants all to be done while inptn. daughter wants to be HCP as per ptn's wishes. she was given paperwork to get it signed and witnessed and will present to nursing thereafter. details of findings and complaints and plan of care d/w daughter and wife. spent 60 min. RVP ordered. start mucinex , tessalon perles, duonebs, get CT chest to r/o PNA. pulm called    -  2/20:  ptn had RRT 2/2 AMS and tremors. no SZ, no LOC. noted to have Na 123( hyponatremia), given 500 cc NS. Gabapentin DCed. ptn had been taking gabapentin at home PTA. Pain is controlled. Pruritis resolved, tolerating HD otherwise.     - Pain management:  cont Oxycodone 10 IR q6H,  DIlaudid prn severe pain 2 mg q4H prn.   - cont outptn meds  - DVT ppx w HSC

## 2025-03-31 NOTE — PROGRESS NOTE ADULT - SUBJECTIVE AND OBJECTIVE BOX
Doing okay today. some pain. otherwise NAD. s/p HD today.     acetaminophen     Tablet .. 650 milliGRAM(s) Oral every 6 hours PRN  albuterol/ipratropium for Nebulization 3 milliLiter(s) Nebulizer every 6 hours  aspirin  chewable 81 milliGRAM(s) Oral daily  atorvastatin 40 milliGRAM(s) Oral at bedtime  benzocaine/menthol Lozenge 1 Lozenge Oral three times a day PRN  bisacodyl 5 milliGRAM(s) Oral every 12 hours PRN  chlorhexidine 2% Cloths 1 Application(s) Topical daily  clopidogrel Tablet 75 milliGRAM(s) Oral daily  Dakins Solution - 1/4 Strength 1 Application(s) Topical every 8 hours  epoetin aleah-epbx (RETACRIT) Injectable 99047 Unit(s) IV Push <User Schedule>  heparin   Injectable 5000 Unit(s) SubCutaneous every 8 hours  HYDROmorphone  Injectable 2 milliGRAM(s) IV Push every 4 hours PRN  hydrOXYzine hydrochloride 25 milliGRAM(s) Oral three times a day  ibuprofen  Tablet. 400 milliGRAM(s) Oral every 12 hours  lactobacillus acidophilus 1 Tablet(s) Oral two times a day with meals  meropenem  IVPB 500 milliGRAM(s) IV Intermittent every 12 hours  mupirocin 2% Ointment 1 Application(s) Topical <User Schedule>  Nephro-margaret 1 Tablet(s) Oral daily  oxyCODONE    IR 10 milliGRAM(s) Oral every 4 hours PRN  oxyCODONE  ER Tablet 30 milliGRAM(s) Oral every 12 hours  pantoprazole    Tablet 40 milliGRAM(s) Oral before breakfast  polyethylene glycol 3350 17 Gram(s) Oral daily  povidone iodine 10% Solution 1 Application(s) Topical two times a day  senna 2 Tablet(s) Oral at bedtime  sodium chloride 0.9% lock flush 10 milliLiter(s) IV Push every 1 hour PRN      VITAL:  T(C): , Max: 38.3 (03-30-25 @ 00:20)  T(F): , Max: 100.9 (03-30-25 @ 00:20)  HR: 85 (03-30-25 @ 08:50)  BP: 109/65 (03-30-25 @ 08:50)  BP(mean): --  RR: 18 (03-30-25 @ 08:50)  SpO2: 93% (03-30-25 @ 08:50)  Wt(kg): --      PHYSICAL EXAM:  Constitutional: alert, NAD  HEENT: NCAT, DMM  Neck: Supple, No JVD  Respiratory: CTA-b/l  Cardiovascular: RRR s1s2, no m/r/g  Gastrointestinal: BS+, soft, NT/ND  : (+)suprapubic cath  Extremities: 1+ b/l LE edema  Neurological: reduced generalized strength  Back: no CVAT b/l  Skin: (+)cyanotic changes b/l feet  Access: RIJ tunneled cath (accessed)   LABS:      Na(136)/K(3.8)/Cl(101)/HCO3(23)/BUN(19)/Cr(2.52)Glu(157)/Ca(7.6)/Mg(--)/PO4(--)    03-29 @ 10:16  Na(129)/K(4.2)/Cl(94)/HCO3(24)/BUN(30)/Cr(3.21)Glu(124)/Ca(7.5)/Mg(--)/PO4(--)    03-28 @ 10:24    Urinalysis Basic - ( 29 Mar 2025 10:16 )    Color: x / Appearance: x / SG: x / pH: x  Gluc: 157 mg/dL / Ketone: x  / Bili: x / Urobili: x   Blood: x / Protein: x / Nitrite: x   Leuk Esterase: x / RBC: x / WBC x   Sq Epi: x / Non Sq Epi: x / Bacteria: x              IMPRESSION: 74M w/ polio, neurogenic bladder/suprapubic catheter, 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 HD today    (2)Hyponatremia - Na+ improving(3/29/25)  s/p  high-Na+ bath with HD    (3)Anemia - s/p IV iron; on Retacrit with HD    (4)PAD - s/p LE bypass 3/3/25 - cyanotic changes of toes b/l - for further management after discharge    (5)CV - multifactorial LE edema; improving with increasing intradialytic UF, and with improving nutritional parameters    (6)Neuro - reduced generalized strength - getting PT/awaiting [facility]    RECOMMEND:   (1)HD today per MWF schedule, next wednesday per [facility] schedule  (2)retacrit with HD  (3)LE would/bypass per vascular  (4)Dose Rx for CrCl <10 ESRD MWF        Fletcher Whitehead DO   [facility]  [phone]

## 2025-03-31 NOTE — PROGRESS NOTE ADULT - ASSESSMENT
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 The initial plan was that he would present to the [facility] ER Monday 2/17 for HD initiation. However, day of admission, he fell and sustained trauma to his knee. Called to consult for cough, elevated R hemidiaphragm.

## 2025-03-31 NOTE — PROGRESS NOTE ADULT - SUBJECTIVE AND OBJECTIVE BOX
Subjective: Patient seen and examined. No new events except as noted.   pt seen at HD    REVIEW OF SYSTEMS:  unable to obtain    MEDICATIONS:  MEDICATIONS  (STANDING):  albuterol/ipratropium for Nebulization 3 milliLiter(s) Nebulizer every 6 hours  aspirin  chewable 81 milliGRAM(s) Oral daily  atorvastatin 40 milliGRAM(s) Oral at bedtime  chlorhexidine 2% Cloths 1 Application(s) Topical daily  clopidogrel Tablet 75 milliGRAM(s) Oral daily  Dakins Solution - 1/4 Strength 1 Application(s) Topical every 8 hours  epoetin aleah-epbx (RETACRIT) Injectable 87220 Unit(s) IV Push <User Schedule>  heparin   Injectable 5000 Unit(s) SubCutaneous every 8 hours  hydrOXYzine hydrochloride 25 milliGRAM(s) Oral three times a day  ibuprofen  Tablet. 400 milliGRAM(s) Oral every 12 hours  lactobacillus acidophilus 1 Tablet(s) Oral two times a day with meals  meropenem  IVPB 500 milliGRAM(s) IV Intermittent every 12 hours  mupirocin 2% Ointment 1 Application(s) Topical <User Schedule>  Nephro-margaret 1 Tablet(s) Oral daily  oxyCODONE  ER Tablet 30 milliGRAM(s) Oral every 12 hours  pantoprazole    Tablet 40 milliGRAM(s) Oral before breakfast  polyethylene glycol 3350 17 Gram(s) Oral daily  povidone iodine 10% Solution 1 Application(s) Topical two times a day  senna 2 Tablet(s) Oral at bedtime      PHYSICAL EXAM:  T(C): 36.1 (03-31-25 @ 08:35), Max: 36.9 (03-31-25 @ 07:53)  HR: 59 (03-31-25 @ 08:35) (59 - 80)  BP: 125/55 (03-31-25 @ 08:35) (101/71 - 125/55)  RR: 18 (03-31-25 @ 08:35) (18 - 18)  SpO2: 93% (03-31-25 @ 08:35) (93% - 97%)  Wt(kg): --  I&O's Summary    30 Mar 2025 07:01  -  31 Mar 2025 07:00  --------------------------------------------------------  IN: 50 mL / OUT: 500 mL / NET: -450 mL          Appearance: NAD  HEENT:  Dry oral mucosa, PERRL, EOMI	  Lymphatic: No lymphadenopathy  Cardiovascular: Normal S1 S2, No JVD, No murmurs, No edema  Respiratory: Lungs clear to auscultation	  Psychiatry: A & O x 3, Mood & affect appropriate  Skin: No rashes, No ecchymoses, No cyanosis	  Neurologic: Non-focal  GI/Abd: Soft, obese, nontender. Dressing to [facility] C/D/I. Suprapubic catheter in place  Ext: Palp femoral pulses bilat. BLE atrophic, minimal dorsi/plantarflexion bilaterally. Sensation grossly intact. LLE edematous compared to R, erythema to right toes/forefoot. mottling of right toes/forefoot/heel  LLE:  small superficial abrasion, +edema and +ecchymosis over L knee  +TTP over L knee, no TTP along remainder of extremity; compartments soft  Limited ROM at knee 2/2 pain  No gross varus/valgus laxity, but assessment limited 2/2 pain  Motor: TA/EHL/GS/FHL intact  Sensory: DP/SP/Tib/Jordan/Saph SILT  +DP pulse (symmetric relative to contralateral side), WWP   pressure wound from the LLE immobilizer posteriorly proximal to the knee.  Vascular: Peripheral pulses palpable 2+ bilaterally    LABS:    CARDIAC MARKERS:                                9.4    9.37  )-----------( 285      ( 31 Mar 2025 08:49 )             31.5     03-31    130[L]  |  96  |  32[H]  ----------------------------<  93  4.6   |  22  |  3.63[H]    Ca    7.5[L]      31 Mar 2025 08:49      proBNP:   Lipid Profile:   HgA1c:   TSH:             TELEMETRY: 	    ECG:  	  RADIOLOGY:   DIAGNOSTIC TESTING:  [ ] Echocardiogram:  [ ]  Catheterization:  [ ] Stress Test:    OTHER:

## 2025-03-31 NOTE — PROGRESS NOTE ADULT - SUBJECTIVE AND OBJECTIVE BOX
Patient is a 75y old  Male who presents with a chief complaint of ESRD requiring HD, uremia (31 Mar 2025 16:38)      SUBJECTIVE / OVERNIGHT EVENTS: at HD today had 1.7 L UF, post HD was hypotense, gave gentle IVF , BP still a bit low. afebrile, loose BMs but not diarrhea. on Meropenem course for total of 10 days, last day 4/3, would care for decubiti and left knee wound post trauma    MEDICATIONS  (STANDING):  albuterol/ipratropium for Nebulization 3 milliLiter(s) Nebulizer every 6 hours  aspirin  chewable 81 milliGRAM(s) Oral daily  atorvastatin 40 milliGRAM(s) Oral at bedtime  chlorhexidine 2% Cloths 1 Application(s) Topical daily  clopidogrel Tablet 75 milliGRAM(s) Oral daily  Dakins Solution - 1/4 Strength 1 Application(s) Topical every 8 hours  epoetin aleah-epbx (RETACRIT) Injectable 64115 Unit(s) IV Push <User Schedule>  heparin   Injectable 5000 Unit(s) SubCutaneous every 8 hours  hydrOXYzine hydrochloride 25 milliGRAM(s) Oral three times a day  ibuprofen  Tablet. 400 milliGRAM(s) Oral every 12 hours  lactobacillus acidophilus 1 Tablet(s) Oral two times a day with meals  meropenem  IVPB 500 milliGRAM(s) IV Intermittent every 12 hours  mupirocin 2% Ointment 1 Application(s) Topical <User Schedule>  Nephro-margaret 1 Tablet(s) Oral daily  oxyCODONE  ER Tablet 30 milliGRAM(s) Oral every 12 hours  pantoprazole    Tablet 40 milliGRAM(s) Oral before breakfast  polyethylene glycol 3350 17 Gram(s) Oral daily  povidone iodine 10% Solution 1 Application(s) Topical two times a day  senna 2 Tablet(s) Oral at bedtime    MEDICATIONS  (PRN):  acetaminophen     Tablet .. 650 milliGRAM(s) Oral every 6 hours PRN Temp greater or equal to 38C (100.4F), Mild Pain (1 - 3)  benzocaine/menthol Lozenge 1 Lozenge Oral three times a day PRN Sore Throat  bisacodyl 5 milliGRAM(s) Oral every 12 hours PRN Constipation  HYDROmorphone  Injectable 2 milliGRAM(s) IV Push every 4 hours PRN Severe Pain (7 - 10)  oxyCODONE    IR 10 milliGRAM(s) Oral every 4 hours PRN Moderate Pain (4 - 6)  sodium chloride 0.9% lock flush 10 milliLiter(s) IV Push every 1 hour PRN Pre/post blood products, medications, blood draw, and to maintain line patency      Vital Signs Last 24 Hrs  T(F): 98.4 (03-31-25 @ 17:44), Max: 98.4 (03-31-25 @ 07:53)  HR: 91 (03-31-25 @ 17:51) (57 - 91)  BP: 84/53 (03-31-25 @ 17:51) (84/53 - 125/55)  RR: 18 (03-31-25 @ 17:44) (18 - 18)  SpO2: 93% (03-31-25 @ 17:44) (92% - 97%)  Telemetry:   CAPILLARY BLOOD GLUCOSE        I&O's Summary    30 Mar 2025 07:01  -  31 Mar 2025 07:00  --------------------------------------------------------  IN: 50 mL / OUT: 500 mL / NET: -450 mL    31 Mar 2025 07:01  -  31 Mar 2025 21:15  --------------------------------------------------------  IN: 0 mL / OUT: 1600 mL / NET: -1600 mL        PHYSICAL EXAM:  GENERAL: NAD, well-developed  HEAD:  Atraumatic, Normocephalic  EYES: EOMI, PERRLA, conjunctiva and sclera clear  NECK: Supple, No JVD  CHEST/LUNG: Clear to auscultation bilaterally; No wheeze  HEART: Regular rate and rhythm; No murmurs, rubs, or gallops  ABDOMEN: Soft, Nontender, Nondistended; Bowel sounds present  EXTREMITIES:  2+ Peripheral Pulses, No clubbing, cyanosis, or edema  PSYCH: AAOx3  NEUROLOGY: non-focal  SKIN: No rashes or lesions    LABS:                        9.4    9.37  )-----------( 285      ( 31 Mar 2025 08:49 )             31.5     03-31    130[L]  |  96  |  32[H]  ----------------------------<  93  4.6   |  22  |  3.63[H]    Ca    7.5[L]      31 Mar 2025 08:49            Urinalysis Basic - ( 31 Mar 2025 08:49 )    Color: x / Appearance: x / SG: x / pH: x  Gluc: 93 mg/dL / Ketone: x  / Bili: x / Urobili: x   Blood: x / Protein: x / Nitrite: x   Leuk Esterase: x / RBC: x / WBC x   Sq Epi: x / Non Sq Epi: x / Bacteria: x        RADIOLOGY & ADDITIONAL TESTS:    Imaging Personally Reviewed:    Consultant(s) Notes Reviewed:      Care Discussed with Consultants/Other Providers:

## 2025-03-31 NOTE — PROGRESS NOTE ADULT - SUBJECTIVE AND OBJECTIVE BOX
[facility]  SABINO Griffin Y. Patel, S. Shah, G. Casimir  [phone]  ([phone] - weekdays after 5pm and weekends)    Name: BRIAN DEMPSEY  Age/Gender: 75y Male  MRN: [id number]    Interval History:  Patient seen and examined this morning at HD.   Reports fever and sweating over weekend.  States he has a dry cough, no chest pain or dyspnea.   States loose stools after bowel regimen, no abd pain, some nausea with no vomiting.   States he had pain when he was being turned to clean this am.   Notes reviewed. No concerning overnight events  Afebrile over past 24h.   Allergies: No Known Allergies      Objective:  Vitals:   T(F): 97 (03-31-25 @ 08:35), Max: 98.4 (03-31-25 @ 07:53)  HR: 59 (03-31-25 @ 08:35) (59 - 80)  BP: 125/55 (03-31-25 @ 08:35) (101/71 - 125/55)  RR: 18 (03-31-25 @ 08:35) (18 - 18)  SpO2: 93% (03-31-25 @ 08:35) (93% - 97%)  Physical Examination:  General: no acute distress, RA, on HD  HEENT: normocephalic, atraumatic, anicteric  Respiratory: decreased breath sounds b/l   Cardiovascular: S1 and S2 present, normal rate   Gastrointestinal: soft, NT, ND, SPC   Extremities: LE ischemic changes, LE edema  Skin: no visible rash    Laboratory Studies:  CBC:                       9.4    9.37  )-----------( 285      ( 31 Mar 2025 08:49 )             31.5     WBC Trend:  9.37 03-31-25 @ 08:49  8.20 03-28-25 @ 10:23    CMP: 03-31    130[L]  |  96  |  32[H]  ----------------------------<  93  4.6   |  22  |  3.63[H]    Ca    7.5[L]      31 Mar 2025 08:49      Creatinine: 3.63 mg/dL (03-31-25 @ 08:49)  Creatinine: 2.52 mg/dL (03-29-25 @ 10:16)  Creatinine: 3.21 mg/dL (03-28-25 @ 10:24)    Microbiology: reviewed   03-31-25 @ 07:28 SARS-CoV-2 NotDetec/Influenza A NotDetec/Influenza B NotDetec/RSV NotDetec    Radiology: reviewed     Medications:  acetaminophen     Tablet .. 650 milliGRAM(s) Oral every 6 hours PRN  albuterol/ipratropium for Nebulization 3 milliLiter(s) Nebulizer every 6 hours  aspirin  chewable 81 milliGRAM(s) Oral daily  atorvastatin 40 milliGRAM(s) Oral at bedtime  benzocaine/menthol Lozenge 1 Lozenge Oral three times a day PRN  bisacodyl 5 milliGRAM(s) Oral every 12 hours PRN  chlorhexidine 2% Cloths 1 Application(s) Topical daily  clopidogrel Tablet 75 milliGRAM(s) Oral daily  Dakins Solution - 1/4 Strength 1 Application(s) Topical every 8 hours  epoetin aleah-epbx (RETACRIT) Injectable 73560 Unit(s) IV Push <User Schedule>  heparin   Injectable 5000 Unit(s) SubCutaneous every 8 hours  HYDROmorphone  Injectable 2 milliGRAM(s) IV Push every 4 hours PRN  hydrOXYzine hydrochloride 25 milliGRAM(s) Oral three times a day  ibuprofen  Tablet. 400 milliGRAM(s) Oral every 12 hours  lactobacillus acidophilus 1 Tablet(s) Oral two times a day with meals  meropenem  IVPB 500 milliGRAM(s) IV Intermittent every 12 hours  mupirocin 2% Ointment 1 Application(s) Topical <User Schedule>  Nephro-margaret 1 Tablet(s) Oral daily  oxyCODONE    IR 10 milliGRAM(s) Oral every 4 hours PRN  oxyCODONE  ER Tablet 30 milliGRAM(s) Oral every 12 hours  pantoprazole    Tablet 40 milliGRAM(s) Oral before breakfast  polyethylene glycol 3350 17 Gram(s) Oral daily  povidone iodine 10% Solution 1 Application(s) Topical two times a day  senna 2 Tablet(s) Oral at bedtime  sodium chloride 0.9% lock flush 10 milliLiter(s) IV Push every 1 hour PRN    Current Antimicrobials:  meropenem  IVPB 500 milliGRAM(s) IV Intermittent every 12 hours    Prior/Completed Antimicrobials:  piperacillin/tazobactam IVPB.  piperacillin/tazobactam IVPB.  piperacillin/tazobactam IVPB.-  piperacillin/tazobactam IVPB.-  trimethoprim   80 mG/sulfamethoxazole 400 mG  trimethoprim  160 mG/sulfamethoxazole 800 mG  vancomycin  IVPB  vancomycin  IVPB

## 2025-03-31 NOTE — PROGRESS NOTE ADULT - SUBJECTIVE AND OBJECTIVE BOX
Neurology      S; patient seen. no neuro changes; wife at bedside      Medications: MEDICATIONS  (STANDING):  albuterol/ipratropium for Nebulization 3 milliLiter(s) Nebulizer every 6 hours  aspirin  chewable 81 milliGRAM(s) Oral daily  atorvastatin 40 milliGRAM(s) Oral at bedtime  chlorhexidine 2% Cloths 1 Application(s) Topical daily  clopidogrel Tablet 75 milliGRAM(s) Oral daily  Dakins Solution - 1/4 Strength 1 Application(s) Topical every 8 hours  epoetin aleah-epbx (RETACRIT) Injectable 36321 Unit(s) IV Push <User Schedule>  heparin   Injectable 5000 Unit(s) SubCutaneous every 8 hours  hydrOXYzine hydrochloride 25 milliGRAM(s) Oral three times a day  ibuprofen  Tablet. 400 milliGRAM(s) Oral every 12 hours  lactobacillus acidophilus 1 Tablet(s) Oral two times a day with meals  meropenem  IVPB 500 milliGRAM(s) IV Intermittent every 12 hours  mupirocin 2% Ointment 1 Application(s) Topical <User Schedule>  Nephro-margaret 1 Tablet(s) Oral daily  oxyCODONE  ER Tablet 30 milliGRAM(s) Oral every 12 hours  pantoprazole    Tablet 40 milliGRAM(s) Oral before breakfast  polyethylene glycol 3350 17 Gram(s) Oral daily  povidone iodine 10% Solution 1 Application(s) Topical two times a day  senna 2 Tablet(s) Oral at bedtime    MEDICATIONS  (PRN):  acetaminophen     Tablet .. 650 milliGRAM(s) Oral every 6 hours PRN Temp greater or equal to 38C (100.4F), Mild Pain (1 - 3)  benzocaine/menthol Lozenge 1 Lozenge Oral three times a day PRN Sore Throat  bisacodyl 5 milliGRAM(s) Oral every 12 hours PRN Constipation  HYDROmorphone  Injectable 2 milliGRAM(s) IV Push every 4 hours PRN Severe Pain (7 - 10)  oxyCODONE    IR 10 milliGRAM(s) Oral every 4 hours PRN Moderate Pain (4 - 6)  sodium chloride 0.9% lock flush 10 milliLiter(s) IV Push every 1 hour PRN Pre/post blood products, medications, blood draw, and to maintain line patency       Vitals:  Vital Signs Last 24 Hrs  T(C): 36.1 (31 Mar 2025 08:35), Max: 36.9 (31 Mar 2025 07:53)  T(F): 97 (31 Mar 2025 08:35), Max: 98.4 (31 Mar 2025 07:53)  HR: 59 (31 Mar 2025 08:35) (59 - 80)  BP: 125/55 (31 Mar 2025 08:35) (101/71 - 125/55)  BP(mean): --  RR: 18 (31 Mar 2025 08:35) (18 - 18)  SpO2: 93% (31 Mar 2025 08:35) (93% - 97%)    Parameters below as of 31 Mar 2025 08:35  Patient On (Oxygen Delivery Method): room air                General Appearance: Appropriately dressed and in no acute distress       Head: Normocephalic, atraumatic and no dysmorphic features  Ear, Nose, and Throat: Moist mucous membranes  CVS: S1S2+  Resp: No SOB, no wheeze or rhonchi  GI: soft NT/ND  Extremities: LE PVD : dusky toes noted bilaterally L>R   Skin: + decub      Neurological Exam:  Mental Status: Awake, alert and oriented x 2.  Able to follow simple and complex verbal commands. Able to name and repeat. fluent speech. No obvious aphasia or dysarthria noted.   Cranial Nerves: PERRL, EOMI, VFFC, sensation V1-V3 intact,  no obvious facial asymmetry, equal elevation of palate, scm/trap 5/5, tongue is midline on protrusion. no obvious papilledema on fundoscopic exam. hearing is grossly intact.   Motor: Normal bulk, tone and strength throughout uppers 4/ lowers 0-/1 5   Sensation: Intact to light touch and pinprick throughout.     Coordination: No dysmetria on FNF   Gait: deferred, wheelchair bound     Data/Labs/Imaging which I personally reviewed.       LABS:                          9.4    9.37  )-----------( 285      ( 31 Mar 2025 08:49 )             31.5     03-31    130[L]  |  96  |  32[H]  ----------------------------<  93  4.6   |  22  |  3.63[H]    Ca    7.5[L]      31 Mar 2025 08:49          Urinalysis Basic - ( 31 Mar 2025 08:49 )    Color: x / Appearance: x / SG: x / pH: x  Gluc: 93 mg/dL / Ketone: x  / Bili: x / Urobili: x   Blood: x / Protein: x / Nitrite: x   Leuk Esterase: x / RBC: x / WBC x   Sq Epi: x / Non Sq Epi: x / Bacteria: x            < from: CT Head No Cont (02.20.25 @ 18:47) >    ACC: [id number] EXAM:  CT BRAIN   ORDERED BY: GUY DE LA CRUZ     PROCEDURE DATE:  02/20/2025          INTERPRETATION:  CLINICAL INDICATION: Altered mental status    TECHNIQUE: Axial CT scanning of the brain was obtained from the skull   base to the vertex without the administration of intravenous contrast.   Reformatted coronal and sagittal images were subsequently obtained and   reviewed.    COMPARISON: None    FINDINGS:  There is no CT evidence of acute transcortical infarct. Age-related   involutional changes and chronic microvascular ischemic changes.    There is no hydrocephalus, mass effect, or acute intracranial hemorrhage.   No extra-axial collection. Basal cisterns are patent.    The visualized paranasal sinuses and mastoid air cells are clear.    The calvarium is intact.    IMPRESSION:  No evidence of acute transcortical infarct, acute intracranial   hemorrhage, or mass effect.    --- End of Report ---            CORTNEY REARDON MD; Attending Radiologist  This document has been electronically signed. Feb 20 2025  7:07PM    < end of copied text >

## 2025-03-31 NOTE — PROGRESS NOTE ADULT - ASSESSMENT
74M with history of HTN, polio with multiple subsequent problems including LE weakness requiring wheelchair, neurogenic bladder s/p suprapubic catheter, and colostomy s/p iatrogenic rectal injury 2/2023, and CKD 5 presenting to the ED with weakness and falls. Admitted for initiation of HD now s/p R IJ shiley placement on 2/17/25 and conversion to tunneled catheter on 2/25. Vascular surgery initially consulted for AVF creation; however, patient's worsening PAD with worsening ischemic changes is the more urgent issue now s/p b/l iliac stent placement on 3/3/25.     Wound Consult requested to assist w/ management of:  Sacral, Rt Ischial, and Left Buttock wounds  BLE severe PAD w/ extensive wounds     Sacrum- continue to apack w/ 1/4 Dakin's moistened packing  Buttocks/ Thighs continue w/ TRIAD BID and prn soiling        Continue w/ attends under pads and Pericare as per protocol  Lt knee/ calf wounds- Medihoney dressing QD  Feet /ankle skin changes- as per podiatry  Appreciate Vascular input- continue to follow up  Ortho Follow up for Knee fx  Feet f/u as per Podiatry/ Vascular  BLE elevation & Compression  Abx per Medicine/ ID  Consider MRI vs CT to r/o OM of sacrum  Consider Palliative for GOC and pain mngt  Moisturize intact skin w/ SWEEN cream BID  Nutrition Consult for optimization in pt w/ Moderate Protein Calorie Malnutrition,        encourage high quality protein, sandy/ prosource, MVI & Vit C to promote wound healing  Continue turning and positioning w/ offloading assistive devices as per protocol  Waffle Cushion to chair when oob to chair  Continue w/ low air loss pressure redistribution bed surface   Pt will need Group 2 mattress on hospital al bed and ROHO cushion for wheel chair upon discharge home  Care as per medicine, will follow w/ you  Upon discharge f/u as outpatient at [facility] [address] [facility] [phone]  s/w RN and D/w team  & attng  Thank you for this consult  Danica Humphreys PA-C CWS [id number]  Nights/ Weekends/ Holidays please call:  General Surgery Consult pager [phone]) for emergencies  Wound PT for multilayer leg wrapping or VAC issues ([phone])   I spent  50minutes face to face w/ this pt of which more than 50% of the time was spent counseling & coordinating care of this pt.

## 2025-03-31 NOTE — PROGRESS NOTE ADULT - SUBJECTIVE AND OBJECTIVE BOX
[facility]-- WOUND TEAM -- FOLLOW UP NOTE  --------------------------------------------------------------------------------    24 hour events/subjective:          Diet:  Diet, Regular:   1000mL Fluid Restriction ([id number])  Low Sodium  Liquid Protein Supplement     Qty per Day:  2  Supplement Feeding Modality:  Oral  Nepro Cans or Servings Per Day:  2       Frequency:  Daily (03-27-25 @ 14:01)      ROS: General/ SKIN/ MSK/ Neuro/ GI see HPI  all other systems negative      ALLERGIES & MEDICATIONS  --------------------------------------------------------------------------------    No Known Allergies      STANDING INPATIENT MEDICATIONS  albuterol/ipratropium for Nebulization 3 milliLiter(s) Nebulizer every 6 hours  aspirin  chewable 81 milliGRAM(s) Oral daily  atorvastatin 40 milliGRAM(s) Oral at bedtime  chlorhexidine 2% Cloths 1 Application(s) Topical daily  clopidogrel Tablet 75 milliGRAM(s) Oral daily  Dakins Solution - 1/4 Strength 1 Application(s) Topical every 8 hours  epoetin aleah-epbx (RETACRIT) Injectable 21150 Unit(s) IV Push <User Schedule>  heparin   Injectable 5000 Unit(s) SubCutaneous every 8 hours  hydrOXYzine hydrochloride 25 milliGRAM(s) Oral three times a day  ibuprofen  Tablet. 400 milliGRAM(s) Oral every 12 hours  lactobacillus acidophilus 1 Tablet(s) Oral two times a day with meals  meropenem  IVPB 500 milliGRAM(s) IV Intermittent every 12 hours  mupirocin 2% Ointment 1 Application(s) Topical <User Schedule>  Nephro-margaret 1 Tablet(s) Oral daily  oxyCODONE  ER Tablet 30 milliGRAM(s) Oral every 12 hours  pantoprazole Tablet 40 milliGRAM(s) Oral before breakfast  polyethylene glycol 3350 17 Gram(s) Oral daily  povidone iodine 10% Solution 1 Application(s) Topical two times a day  senna 2 Tablet(s) Oral at bedtime      PRN INPATIENT MEDICATION  acetaminophen Tablet 650 milliGRAM(s) Oral every 6 hours PRN  benzocaine/menthol Lozenge 1 Lozenge Oral three times a day PRN  bisacodyl 5 milliGRAM(s) Oral every 12 hours PRN  HYDROmorphone  Injectable 2 milliGRAM(s) IV Push every 4 hours PRN  oxyCODONE  IR 10 milliGRAM(s) Oral every 4 hours PRN  sodium chloride 0.9% lock flush 10 milliLiter(s) IV Push every 1 hour PRN        VITALS/PHYSICAL EXAM  --------------------------------------------------------------------------------  T(C): 36.4 (03-31-25 @ 12:15), Max: 36.9 (03-31-25 @ 07:53)  HR: 57 (03-31-25 @ 12:15) (57 - 80)  BP: 112/56 (03-31-25 @ 12:15) (101/71 - 125/55)  RR: 18 (03-31-25 @ 12:15) (18 - 18)  SpO2: 92% (03-31-25 @ 12:15) (92% - 97%)  Wt(kg): --                    LABS/ CULTURES/ RADIOLOGY:              9.4    9.37  >-----------<  285      [03-31-25 @ 08:49]              31.5     130  |  96  |  32  ----------------------------<  93      [03-31-25 @ 08:49]  4.6   |  22  |  3.63        Ca     7.5     [03-31-25 @ 08:49]          A1C with Estimated Average Glucose Result: 5.7 % (12-21-24 @ 06:44)  A1C with Estimated Average Glucose Result: 4.9 % (10-09-24 @ 13:30)   [facility]-- WOUND TEAM -- FOLLOW UP NOTE  --------------------------------------------------------------------------------    24 hour events/subjective:    alert  afebrile  incontinent  improving pain LLE  tolerating po w/o n/v but poor appetite  Pt premedicated - no odor or excess drainage     tolerating dressing chnages      Diet:  Diet, Regular:   1000mL Fluid Restriction ([id number])  Low Sodium  Liquid Protein Supplement     Qty per Day:  2  Supplement Feeding Modality:  Oral  Nepro Cans or Servings Per Day:  2       Frequency:  Daily (03-27-25 @ 14:01)      ROS: General/ SKIN/ MSK/ Neuro/ GI see HPI  all other systems negative      ALLERGIES & MEDICATIONS  --------------------------------------------------------------------------------    No Known Allergies      STANDING INPATIENT MEDICATIONS  albuterol/ipratropium for Nebulization 3 milliLiter(s) Nebulizer every 6 hours  aspirin  chewable 81 milliGRAM(s) Oral daily  atorvastatin 40 milliGRAM(s) Oral at bedtime  chlorhexidine 2% Cloths 1 Application(s) Topical daily  clopidogrel Tablet 75 milliGRAM(s) Oral daily  Dakins Solution - 1/4 Strength 1 Application(s) Topical every 8 hours  epoetin aleah-epbx (RETACRIT) Injectable 84440 Unit(s) IV Push <User Schedule>  heparin   Injectable 5000 Unit(s) SubCutaneous every 8 hours  hydrOXYzine hydrochloride 25 milliGRAM(s) Oral three times a day  ibuprofen  Tablet. 400 milliGRAM(s) Oral every 12 hours  lactobacillus acidophilus 1 Tablet(s) Oral two times a day with meals  meropenem  IVPB 500 milliGRAM(s) IV Intermittent every 12 hours  mupirocin 2% Ointment 1 Application(s) Topical <User Schedule>  Nephro-margaret 1 Tablet(s) Oral daily  oxyCODONE  ER Tablet 30 milliGRAM(s) Oral every 12 hours  pantoprazole Tablet 40 milliGRAM(s) Oral before breakfast  polyethylene glycol 3350 17 Gram(s) Oral daily  povidone iodine 10% Solution 1 Application(s) Topical two times a day  senna 2 Tablet(s) Oral at bedtime      PRN INPATIENT MEDICATION  acetaminophen Tablet 650 milliGRAM(s) Oral every 6 hours PRN  benzocaine/menthol Lozenge 1 Lozenge Oral three times a day PRN  bisacodyl 5 milliGRAM(s) Oral every 12 hours PRN  HYDROmorphone  Injectable 2 milliGRAM(s) IV Push every 4 hours PRN  oxyCODONE  IR 10 milliGRAM(s) Oral every 4 hours PRN  sodium chloride 0.9% lock flush 10 milliLiter(s) IV Push every 1 hour PRN        VITALS/PHYSICAL EXAM  --------------------------------------------------------------------------------  T(C): 36.4 (03-31-25 @ 12:15), Max: 36.9 (03-31-25 @ 07:53)  HR: 57 (03-31-25 @ 12:15) (57 - 80)  BP: 112/56 (03-31-25 @ 12:15) (101/71 - 125/55)  RR: 18 (03-31-25 @ 12:15) (18 - 18)  SpO2: 92% (03-31-25 @ 12:15) (92% - 97%)  Wt(kg): --      NAD,  A&Ox3, Obese, WD/ WN/ W  [employer] [facility] P500 bed  HEENT:  NC/AT, EOMI, sclera clear, mucosa moist, throat clear, trachea midline, neck supple  Respiratory: nonlabored w/ equal chest rise  Gastrointestinal: soft NT/ND   : (+) superpubic cath     Scrotum skin resolved hyperpigmentation      no blistering or drainage  No odor, erythema, increased warmth, tenderness, induration, fluctuance, nor crepitus  Neurology:  weakened strength & sensation grossly intact  Psych: calm/ appropriate  Musculoskeletal: FROM, no deformities/ contractures  Vascular: BLE equally warm/cool,  no clubbing      (+)BLE edema equal       no BLE DP/PT pulses palpable      BLE demarcation noted across toes and heels w/purple skin changes /dry black eschar      no blistering or drainage  No odor, erythema, increased warmth, tenderness, induration, fluctuance, nor crepitus   Skin: thin, dry, pale, frail,  ecchymosis w/o hematoma  Sacrum into Lt buttocks into perineum wound   black dry & soft eschar  from superior wound edge and area of red granular tissue on inferior edge           with grey necrotic adipose          and fading purple and pink denuded skin changes peripherally   10cm x 13cm x 2cm w/ liquefaction necrosis & odor.  Rt Ischium stage 3 pressure injury     moist pink granular tissue     1.5cm x 3.5cm x0.1cm  Lt Buttocks soft grey eschar denuded skin changes     2cm x 2cm x0.1cm     no blistering or drainage  No odor, erythema, increased warmth, tenderness, induration, fluctuance, nor crepitus  Lt upper thigh wrapping posteriorly w/ 2 wounds    Upper posterior thigh 4cm x 8cm x  0.1cm  grey and white slough and partial thickness skin changes    inferiorly starting anterior medially and wrapping around posterior        2cm x 15cm x 0.1cm soft denuded skin changes w/ small amount of slough  Lt Knee (6cm x 7cm x 0.2cm) & Lateral upper calf (5cm x 4cm x0 .2cm) wounds      dry black eschar  from wound edges w/o drainage  Lt calf wound partial thickness red granular tissue w/ less grey/ white slough      w/ serosanguinous drainage    10cm x 3cm x 0.4cm  No odor, erythema, increased warmth, tenderness, induration, fluctuance, nor crepitus          LABS/ CULTURES/ RADIOLOGY:              9.4    9.37  >-----------<  285      [03-31-25 @ 08:49]              31.5     130  |  96  |  32  ----------------------------<  93      [03-31-25 @ 08:49]  4.6   |  22  |  3.63        Ca     7.5     [03-31-25 @ 08:49]          A1C with Estimated Average Glucose Result: 5.7 % (12-21-24 @ 06:44)  A1C with Estimated Average Glucose Result: 4.9 % (10-09-24 @ 13:30)

## 2025-03-31 NOTE — PROGRESS NOTE ADULT - PROBLEM SELECTOR PLAN 1
CT chest with new RLL consolidation and LLL opacities, tracheal debris  -Possibly aspiration related s/p episode of vomiting  -Continue ABX as per ID  -Continue bronchodilators  -Keep sats >90% with o2 PRN. CT chest with new RLL consolidation and LLL opacities, tracheal debris  -Possibly aspiration related s/p episode of vomiting  -Continue ABX as per ID  -Continue bronchodilators  -Keep sats >90% with o2 PRN  -Pt with low grade temp 3/30, dry cough. RVP negative, now afebrile. Continue to monitor fever curve.

## 2025-03-31 NOTE — PROGRESS NOTE ADULT - SUBJECTIVE AND OBJECTIVE BOX
Date of Service: 03-31-25 @ 13:09    Patient is a 75y old  Male who presents with a chief complaint of ESRD requiring HD, uremia (31 Mar 2025 11:28)      Any change in ROS:   No new respiratory events overnight. Denies SOB/CP.      MEDICATIONS  (STANDING):  albuterol/ipratropium for Nebulization 3 milliLiter(s) Nebulizer every 6 hours  aspirin  chewable 81 milliGRAM(s) Oral daily  atorvastatin 40 milliGRAM(s) Oral at bedtime  chlorhexidine 2% Cloths 1 Application(s) Topical daily  clopidogrel Tablet 75 milliGRAM(s) Oral daily  Dakins Solution - 1/4 Strength 1 Application(s) Topical every 8 hours  epoetin aleah-epbx (RETACRIT) Injectable 42961 Unit(s) IV Push <User Schedule>  heparin   Injectable 5000 Unit(s) SubCutaneous every 8 hours  hydrOXYzine hydrochloride 25 milliGRAM(s) Oral three times a day  ibuprofen  Tablet. 400 milliGRAM(s) Oral every 12 hours  lactobacillus acidophilus 1 Tablet(s) Oral two times a day with meals  meropenem  IVPB 500 milliGRAM(s) IV Intermittent every 12 hours  mupirocin 2% Ointment 1 Application(s) Topical <User Schedule>  Nephro-margaret 1 Tablet(s) Oral daily  oxyCODONE  ER Tablet 30 milliGRAM(s) Oral every 12 hours  pantoprazole    Tablet 40 milliGRAM(s) Oral before breakfast  polyethylene glycol 3350 17 Gram(s) Oral daily  povidone iodine 10% Solution 1 Application(s) Topical two times a day  senna 2 Tablet(s) Oral at bedtime    MEDICATIONS  (PRN):  acetaminophen     Tablet .. 650 milliGRAM(s) Oral every 6 hours PRN Temp greater or equal to 38C (100.4F), Mild Pain (1 - 3)  benzocaine/menthol Lozenge 1 Lozenge Oral three times a day PRN Sore Throat  bisacodyl 5 milliGRAM(s) Oral every 12 hours PRN Constipation  HYDROmorphone  Injectable 2 milliGRAM(s) IV Push every 4 hours PRN Severe Pain (7 - 10)  oxyCODONE    IR 10 milliGRAM(s) Oral every 4 hours PRN Moderate Pain (4 - 6)  sodium chloride 0.9% lock flush 10 milliLiter(s) IV Push every 1 hour PRN Pre/post blood products, medications, blood draw, and to maintain line patency    Vital Signs Last 24 Hrs  T(C): 36.4 (31 Mar 2025 12:15), Max: 36.9 (31 Mar 2025 07:53)  T(F): 97.5 (31 Mar 2025 12:15), Max: 98.4 (31 Mar 2025 07:53)  HR: 57 (31 Mar 2025 12:15) (57 - 80)  BP: 112/56 (31 Mar 2025 12:15) (101/71 - 125/55)  BP(mean): --  RR: 18 (31 Mar 2025 12:15) (18 - 18)  SpO2: 92% (31 Mar 2025 12:15) (92% - 97%)    Parameters below as of 31 Mar 2025 12:15  Patient On (Oxygen Delivery Method): room air        I&O's Summary    30 Mar 2025 07:01  -  31 Mar 2025 07:00  --------------------------------------------------------  IN: 50 mL / OUT: 500 mL / NET: -450 mL    31 Mar 2025 07:01  -  31 Mar 2025 13:09  --------------------------------------------------------  IN: 0 mL / OUT: 1300 mL / NET: -1300 mL          Physical Exam:   GENERAL: NAD, well-groomed, well-developed  HEENT: VINNY/   Atraumatic, Normocephalic  ENMT: No tonsillar erythema, exudates, or enlargement; Moist mucous membranes, Good dentition, No lesions  NECK: Supple, No JVD, Normal thyroid  CHEST/LUNG: Decreased BS   CVS: Regular rate and rhythm; No murmurs, rubs, or gallops  GI: : Soft, Nontender, Nondistended; Bowel sounds present  NERVOUS SYSTEM:  Alert & Oriented X3  EXTREMITIES: +1 LE edema   LYMPH: No lymphadenopathy noted  SKIN: No rashes or lesions  ENDOCRINOLOGY: No Thyromegaly  PSYCH: Appropriate    Labs:                              9.4    9.37  )-----------( 285      ( 31 Mar 2025 08:49 )             31.5                         9.3    8.20  )-----------( 293      ( 28 Mar 2025 10:23 )             31.3     03-31    130[L]  |  96  |  32[H]  ----------------------------<  93  4.6   |  22  |  3.63[H]  03-29    136  |  101  |  19  ----------------------------<  157[H]  3.8   |  23  |  2.52[H]  03-28    129[L]  |  94[L]  |  30[H]  ----------------------------<  124[H]  4.2   |  24  |  3.21[H]    Ca    7.5[L]      31 Mar 2025 08:49      CAPILLARY BLOOD GLUCOSE              Urinalysis Basic - ( 31 Mar 2025 08:49 )    Color: x / Appearance: x / SG: x / pH: x  Gluc: 93 mg/dL / Ketone: x  / Bili: x / Urobili: x   Blood: x / Protein: x / Nitrite: x   Leuk Esterase: x / RBC: x / WBC x   Sq Epi: x / Non Sq Epi: x / Bacteria: x        Studies  1ct< from: CT Chest w/ IV Cont (03.25.25 @ 13:43) >    ACC: [id number] EXAM:  CT CHEST IC   ORDERED BY:  NELL TOLBERT     ACC: [id number] EXAM:  CT ABDOMEN AND PELVIS IC   ORDERED BY:  NELL TOLBERT     PROCEDURE DATE:  03/25/2025          INTERPRETATION:  CLINICAL INFORMATION: Evaluate for pneumonia. Abdominal   pain.    COMPARISON: CT chest 2/23/2025. CT abdomen and pelvis 2/20/2025.    CONTRAST/COMPLICATIONS:  IV Contrast: Omnipaque 350  90 cc administered   10 cc discarded  Oral Contrast: NONE    PROCEDURE:  CT of the Chest, Abdomen and Pelvis was performed.  Sagittal and coronal reformats were performed.    FINDINGS:  CHEST:  LUNGS AND LARGE AIRWAYS: Patent central airways. Small amount of debris   within the trachea. Right lower lobe consolidative opacities. Additional   consolidative opacities in the left lower lobe, possibly atelectasis. A   few ill-defined nodular opacities in the left lower lobe measuring up to   6 mm.  PLEURA: Small left pleural effusion.  VESSELS: Right IJ approach catheter with tip in the superior cavoatrial   junction. Linear nonocclusive filling defect within the right internal   jugular vein (3:1). Atherosclerotic changes. Coronary artery   calcification.  HEART: Heart size is normal. Aortic valve and mitral annular   calcification. No pericardialeffusion.  MEDIASTINUM AND MARYANNE: No lymphadenopathy.  CHEST WALL AND LOWER NECK: Within normal limits.    ABDOMEN AND PELVIS:  LIVER: Subcentimeter hypodensity in the left lobe that is too small to   characterize.  BILE DUCTS: Normal caliber.  GALLBLADDER: Cholelithiasis.  SPLEEN: Within normal limits.  PANCREAS: Within normal limits.  ADRENALS: Within normal limits.  KIDNEYS/URETERS: Bilateral mild hydroureteronephrosis to the level of the   urinary bladder without evidence of obstructive urolithiasis, not   significantly changed from the prior exam of 2/20/2025. Diffuse   urothelial thickening of the bilateral ureters and collecting systems,   also similar to the previous CT. An exophytic hypodense right renal   lesion measuring 4.1 cm is unchanged.    BLADDER: Underdistended with a suprapubic catheter in place.  REPRODUCTIVE ORGANS: Enlarged prostate. Linear densities in the prostate,   possibly from previous UroLift procedure; correlate with the patient's   surgical history.    BOWEL:Rectal wall thickening. Sigmoid anastomosis. No evidence for bowel   obstruction. Normal appendix.  PERITONEUM/RETROPERITONEUM: Within normal limits.  VESSELS: Atherosclerotic changes. Bilateral common iliac/external iliac   artery stents. Previouslydescribed bilateral external iliac and   bilateral superficial femoral artery occlusions seen on the prior CTA is   not well assessed by this exam.  LYMPH NODES: Small left para-aortic lymph node measuring 1.1 x 0.9 cm,   unchanged.  ABDOMINAL WALL: Suprapubic catheter. Subcutaneous gas and subcutaneous   infiltration tracking through the right medial buttock. No discrete   associated collection within the field-of-view..  BONES: Degenerative changes. Bilateral femoral fixation hardware.    IMPRESSION:    CHEST:  *  Right lower lobe consolidative opacities, which may represent   pneumonia. Additional consolidative opacities in the left lower lobe,   potentially atelectasis or infection. A few ill-defined nodular opacities   in the left lower lobe measuring up to 6 mm may also be   infectious/inflammatory in etiology. Suggest continued attention on   follow-up imaging.  *  Small amount of debris in the trachea.  *  Small left pleural effusion.  *  Linear nonocclusive filling defect within the right internal jugular   vein, which may represent residual fibrin sheath or nonocclusive thrombus.    ABDOMEN AND PELVIS:  *  Subcutaneous gas and infiltration tracking along the medial right   buttock, potentially related to a decubitus wound with gas tracking from   the external environment. A necrotizing airforming infection is also a   possibility. Correlate with physical exam.  *  Rectal wall thickening, suggestive of proctitis.  *  Bilateral mild hydroureteronephrosis to the level of the urinary   bladder without evidence of obstructive urolithiasis, and not   significantly changed since 2/20/2025.  *  Diffuse urothelial thickening of the bilateral renal collecting   systems and ureters, which may be seen with an ascending urinary tract   infection or inflammation, similar to the previous CT 2/20/2025.    Findings were discussed with Dr. NELL TOLBERT 3/25/2025 4:30 PM by   Dr. Calzada.    --- End of Report ---    < end of copied text >

## 2025-03-31 NOTE — PROGRESS NOTE ADULT - SUBJECTIVE AND OBJECTIVE BOX
INTERVAL HPI/OVERNIGHT EVENTS:    no GI complaints          acetaminophen     Tablet .. 650 milliGRAM(s) Oral every 6 hours PRN  albuterol/ipratropium for Nebulization 3 milliLiter(s) Nebulizer every 6 hours  aspirin  chewable 81 milliGRAM(s) Oral daily  atorvastatin 40 milliGRAM(s) Oral at bedtime  benzocaine/menthol Lozenge 1 Lozenge Oral three times a day PRN  bisacodyl 5 milliGRAM(s) Oral every 12 hours PRN  chlorhexidine 2% Cloths 1 Application(s) Topical daily  clopidogrel Tablet 75 milliGRAM(s) Oral daily  Dakins Solution - 1/4 Strength 1 Application(s) Topical every 8 hours  epoetin aleah-epbx (RETACRIT) Injectable 08965 Unit(s) IV Push <User Schedule>  heparin   Injectable 5000 Unit(s) SubCutaneous every 8 hours  HYDROmorphone  Injectable 2 milliGRAM(s) IV Push once PRN  HYDROmorphone  Injectable 2 milliGRAM(s) IV Push every 4 hours PRN  hydrOXYzine hydrochloride 25 milliGRAM(s) Oral three times a day  ibuprofen  Tablet. 400 milliGRAM(s) Oral every 12 hours  lactobacillus acidophilus 1 Tablet(s) Oral two times a day with meals  meropenem  IVPB 500 milliGRAM(s) IV Intermittent every 12 hours  mupirocin 2% Ointment 1 Application(s) Topical <User Schedule>  Nephro-margaret 1 Tablet(s) Oral daily  oxyCODONE    IR 10 milliGRAM(s) Oral every 4 hours PRN  oxyCODONE  ER Tablet 30 milliGRAM(s) Oral every 12 hours  pantoprazole    Tablet 40 milliGRAM(s) Oral before breakfast  polyethylene glycol 3350 17 Gram(s) Oral daily  povidone iodine 10% Solution 1 Application(s) Topical two times a day  senna 2 Tablet(s) Oral at bedtime  sodium chloride 0.9% lock flush 10 milliLiter(s) IV Push every 1 hour PRN                            9.3    8.20  )-----------( 293      ( 28 Mar 2025 10:23 )             31.3       Hemoglobin: 9.3 g/dL (03-28 @ 10:23)      03-29    136  |  101  |  19  ----------------------------<  157[H]  3.8   |  23  |  2.52[H]    Ca    7.6[L]      29 Mar 2025 10:16      Creatinine Trend: 2.52<--, 3.21<--, 4.04<--, 3.43<--, 2.84<--, 2.78<--    COAGS:           T(C): 36.4 (03-28-25 @ 23:56), Max: 36.4 (03-28-25 @ 11:14)  HR: 81 (03-28-25 @ 23:56) (81 - 85)  BP: 127/61 (03-28-25 @ 23:56) (113/54 - 142/63)  RR: 18 (03-28-25 @ 23:56) (18 - 18)  SpO2: 95% (03-28-25 @ 23:56) (95% - 96%)  Wt(kg): --    I&O's Summary    28 Mar 2025 07:01  -  29 Mar 2025 07:00  --------------------------------------------------------  IN: 0 mL / OUT: 1600 mL / NET: -1600 mL      Allergies    No Known Allergies    Intolerances        Review of Systems:    General:  No wt loss, fevers, chills, night sweats, fatigue   Eyes:  Good vision, no reported pain  ENT:  No sore throat, pain, runny nose, dysphagia  CV:  No pain, palpitations, hypo/hypertension  Resp:  No dyspnea, cough, tachypnea, wheezing  GI:  No pain, No nausea, No vomiting, No diarrhea, No constipation, No weight loss, No fever, No pruritis, No rectal bleeding, No melena, No dysphagia  :  No pain, bleeding, incontinence, nocturia  Muscle:  No pain, weakness  Neuro:  No weakness, tingling, memory problems  Psych:  No fatigue, insomnia, mood problems, depression  Endocrine:  No polyuria, polydypsia, cold/heat intolerance  Heme:  No petechiae, ecchymosis, easy bruisability  Skin:  No rash, tattoos, scars, edema       PHYSICAL EXAM:    Constitutional: NAD  HEENT: EOMI, throat clear  Neck: No LAD, supple  Respiratory: CTA and P  Cardiovascular: S1 and S2, RRR, no M  Gastrointestinal: BS+, soft, NT/ND, neg HSM,  Extremities: No peripheral edema, neg clubbing, cyanosis  Vascular: 2+ peripheral pulses  Neurological: A/O   Psychiatric: Normal mood, normal affect  Skin: No rashes

## 2025-03-31 NOTE — PROGRESS NOTE ADULT - ASSESSMENT
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  came in after fall and for HD.     2/20 CTH neg   \Na 123 --> now 133   A1c 5.7   TSH WNL 3.46   o/e 2/21 AAOx2, uppers 4/5, lowers limited .  2/23; family bedside upset that he has pain in leg after he was moved.  patient going for imaging now.   s/p R IJ permacath by IR 2/25 2/27 AAOx3   sopoke with family bedisde 3/17 wife says mental status okay, sometimes up and down but good   3/18 was told he has "new neuropathy" spoke iwth patient and wife at bedside. states its the same from before not a new issue.    3/21 mental status stable. c/o pubic cathter   3/23 c/o nausea today   no new changes     Imrpssion  1) AMS, waxing and waing , likely multifactorial from infection, metabolic/electrolyte derrangements/ delerium / medication effect , ureemia which is imporving   2) polio  3) fall 2/2 weakness  4) hyponatremia to 123 2/20  improved 133 -->136;   back down to 129     - HD today   - still on meropenum IV; if dischare planning need a plan ; f/u ID   - patient wants home f/u PT   - Na 129 on most recent blood work.  slow correcttion  ; f/u renal    - AVF outpatient l; f/u vascular     f/u vascular; no vascluar options ; f/u with Messi from vasculare to schedule outpatient procedure   - on DAPT   - was getting oxycodone ER 30mg BID and oxy PRN IR i10 and dilauded PRN ;   consider gabapentin 200mg TID or lyrica 50mg BID fo nueorpathy if no objection ; patient states he still has pain but its better.  vascular has seen patient.  poor prognosis of LLE   - f/u psych   - limit sedating meds   - continue to monitor and correct metabolic derrangements .  - delerium precuations.   - frequent re orientation  - check b12, RPR, ammonia level if never checked    - PT/OT   - check FS, glucose control <180  - GI/DVT ppx  - Thank you for allowing me to participate in the care of this patient. Call with questions.   dc planning    spoke with wife at [facility] 3/28      Paul Limon MD  Vascular Neurology  Office: [phone]

## 2025-04-01 NOTE — PROGRESS NOTE ADULT - PROBLEM SELECTOR PLAN 1
CT chest with new RLL consolidation and LLL opacities, tracheal debris  -Possibly aspiration related s/p episode of vomiting  -Continue ABX as per ID  -Continue bronchodilators  -Keep sats >90% with o2 PRN  -Pt with low grade temp 3/30, dry cough. RVP negative, now afebrile. Continue to monitor fever curve.

## 2025-04-01 NOTE — PROGRESS NOTE ADULT - ASSESSMENT
74 year old male with CKD, sp polio, paraplegia with associated neurogenic bladder s/p suprapubic catheter who was admitted s/p fall on 2/16.   CKD - ESRD, now s/p DEYA [place] 2/17, on HD  ruled out cellulitis around suprapubic cath  2/20 s/p RRT for tremors and confusion -- pt with chronic tremors although notably worse  2/20 CXR with clear lungs   SPC site with chronic/stable inflammatory changes, no drainage - no sign of SSTI  CTA abd with occlusion of b/l external iliac arteries and b/l superficial femoral arteries with distal reconstitution at popliteal arteries; patent three vessel runoff of RLE with 2 vessel runoff of LLE with occlusion of L mid anterior tibial artery with distal reconstitution  Vascular following for worsening PAD with worsening ischemic changes   -3/3 s/p RLE angiogram b/l iliac artery stents   mental status at baseline    3/16 no tenderness at suprapubic catheter site, no visible erythema at catheter site on exam  UA w/ many epithelial cells, Ucx negative  s/p zosyn 3/14-3/16  3/19 SPC site remains without erythema but now noted with some tan crusting on dressing  3/23 reporting vomiting x5 episodes, no diarrhea or other focal sx  Treated for possible SPC site infection with bactrim 3/19-3/25  3/25 CT Chest noted RLL consolidative opacities, LLL opacities - pneumonia vs atelectasis, few ill defined nodular opacities in LLL infectious vs inflammatory  3/25 CTAP with subcutaneous gas and infiltration tracking along medial R buttock, possibly related to decubitus wound with gas tracking from the external environment, possible necrotizing air forming infection; rectal wall thickening suggestive of proctitis; b/l mild hydroureteronephrosis to level of bladder with no obstruction and diffuse urothelial thickening of b/l renal collecting systems and ureters similar to 2/20  MRSA screen has been negative   pt with no resp symptoms- no cough, dyspnea or pain, may have aspirated with vomiting recently, saturating well on RA  3/26 s/p bedside debridement of L knee wound  as dry eschar  from wound edges without drainage and bedside excisional debridement of unstageable sacrum to left buttock into perineum evolving unstageable pressure injury through necrotic dermis into nonviable subcutaneous tissues, debulking debridement of necrotic tissue done by Wound care; other wounds noted including L calf to ankle wound with liquefaction necrotic drainage; R ischium wound with moist pink granular tissue and L buttock fading DTI  3/30 overnight febrile to 100.9F ?inflammatory -- now afebrile x24h, WBC wnl, remains on meropenem   dry cough noted, COVID/Flu/RSV negative;   loose stools per pt after bowel regimen--now diarrhea, some nausea with no vomiting     Recommendations:   Continue meropenem (renally dosed) based on prior cultures  -given location of wounds, patient at risk of contamination, further skin/wound breakdown due to pressure 2/2 being bedbound and prolonged course of antibiotics unlikely to resolve this and will increase risk of C.diff, development of resistant organisms making treatment of infection more difficult in the future in addition to antibiotic adverse effects, therefore, recommend short course 10d course until 4/3/25   With continued local wound care, nutrition, offloading as able  GI following   HD per renal    Monitor temps/WBC  SPC site dressing change and care   Continue rest of care per primary team       Bridgette Ramirez M.D.  [facility]  Available on [employer] TEAMS - *PREFERRED*  [phone]  After 5pm on weekdays and all day on weekends - please call [phone]     Thank you for consulting us and involving us in the management of this patients case. In addition to reviewing history, imaging, documents, labs, microbiology, took into account antibiotic stewardship, local antibiogram and infection control strategies and potential transmission issues at time of treatment decision making process.  74 year old male with CKD, sp polio, paraplegia with associated neurogenic bladder s/p suprapubic catheter who was admitted s/p fall on 2/16.   CKD - ESRD, now s/p DEYA [place] 2/17, on HD  ruled out cellulitis around suprapubic cath  2/20 s/p RRT for tremors and confusion -- pt with chronic tremors although notably worse  2/20 CXR with clear lungs   SPC site with chronic/stable inflammatory changes, no drainage - no sign of SSTI  CTA abd with occlusion of b/l external iliac arteries and b/l superficial femoral arteries with distal reconstitution at popliteal arteries; patent three vessel runoff of RLE with 2 vessel runoff of LLE with occlusion of L mid anterior tibial artery with distal reconstitution  Vascular following for worsening PAD with worsening ischemic changes   -3/3 s/p RLE angiogram b/l iliac artery stents   mental status at baseline    3/16 no tenderness at suprapubic catheter site, no visible erythema at catheter site on exam  UA w/ many epithelial cells, Ucx negative  s/p zosyn 3/14-3/16  3/19 SPC site remains without erythema but now noted with some tan crusting on dressing  3/23 reporting vomiting x5 episodes, no diarrhea or other focal sx  Treated for possible SPC site infection with bactrim 3/19-3/25  3/25 CT Chest noted RLL consolidative opacities, LLL opacities - pneumonia vs atelectasis, few ill defined nodular opacities in LLL infectious vs inflammatory  3/25 CTAP with subcutaneous gas and infiltration tracking along medial R buttock, possibly related to decubitus wound with gas tracking from the external environment, possible necrotizing air forming infection; rectal wall thickening suggestive of proctitis; b/l mild hydroureteronephrosis to level of bladder with no obstruction and diffuse urothelial thickening of b/l renal collecting systems and ureters similar to 2/20  MRSA screen has been negative   pt with no resp symptoms- no cough, dyspnea or pain, may have aspirated with vomiting recently, saturating well on RA  3/26 s/p bedside debridement of L knee wound  as dry eschar  from wound edges without drainage and bedside excisional debridement of unstageable sacrum to left buttock into perineum evolving unstageable pressure injury through necrotic dermis into nonviable subcutaneous tissues, debulking debridement of necrotic tissue done by Wound care; other wounds noted including L calf to ankle wound with liquefaction necrotic drainage; R ischium wound with moist pink granular tissue and L buttock fading DTI  3/30 overnight febrile to 100.9F ?inflammatory -- now afebrile x24h, WBC wnl, remains on meropenem   dry cough noted, COVID/Flu/RSV negative;   loose stools per pt after bowel regimen--not diarrhea, some nausea with no vomiting     Recommendations:   Continue meropenem (renally dosed) based on prior cultures  -given location of wounds, patient at risk of contamination, further skin/wound breakdown due to pressure 2/2 being bedbound and prolonged course of antibiotics unlikely to resolve this and will increase risk of C.diff, development of resistant organisms making treatment of infection more difficult in the future in addition to antibiotic adverse effects, therefore, recommend short course 10d course until 4/3/25   With continued local wound care, nutrition, offloading as able  GI following   HD per renal    Monitor temps/WBC  SPC site dressing change and care   Continue rest of care per primary team       Bridgette Ramirez M.D.  [facility]  Available on [employer] TEAMS - *PREFERRED*  [phone]  After 5pm on weekdays and all day on weekends - please call [phone]     Thank you for consulting us and involving us in the management of this patients case. In addition to reviewing history, imaging, documents, labs, microbiology, took into account antibiotic stewardship, local antibiogram and infection control strategies and potential transmission issues at time of treatment decision making process.

## 2025-04-01 NOTE — PROGRESS NOTE ADULT - SUBJECTIVE AND OBJECTIVE BOX
Doing okay today. some pain. otherwise NAD. s/p HD yesterday c/b post HD hypotension req IVF now improved.     acetaminophen     Tablet .. 650 milliGRAM(s) Oral every 6 hours PRN  albuterol/ipratropium for Nebulization 3 milliLiter(s) Nebulizer every 6 hours  aspirin  chewable 81 milliGRAM(s) Oral daily  atorvastatin 40 milliGRAM(s) Oral at bedtime  benzocaine/menthol Lozenge 1 Lozenge Oral three times a day PRN  bisacodyl 5 milliGRAM(s) Oral every 12 hours PRN  chlorhexidine 2% Cloths 1 Application(s) Topical daily  clopidogrel Tablet 75 milliGRAM(s) Oral daily  Dakins Solution - 1/4 Strength 1 Application(s) Topical every 8 hours  epoetin aleah-epbx (RETACRIT) Injectable 87698 Unit(s) IV Push <User Schedule>  heparin   Injectable 5000 Unit(s) SubCutaneous every 8 hours  HYDROmorphone  Injectable 2 milliGRAM(s) IV Push every 4 hours PRN  hydrOXYzine hydrochloride 25 milliGRAM(s) Oral three times a day  ibuprofen  Tablet. 400 milliGRAM(s) Oral every 12 hours  lactobacillus acidophilus 1 Tablet(s) Oral two times a day with meals  meropenem  IVPB 500 milliGRAM(s) IV Intermittent every 12 hours  mupirocin 2% Ointment 1 Application(s) Topical <User Schedule>  Nephro-margaret 1 Tablet(s) Oral daily  oxyCODONE    IR 10 milliGRAM(s) Oral every 4 hours PRN  oxyCODONE  ER Tablet 30 milliGRAM(s) Oral every 12 hours  pantoprazole    Tablet 40 milliGRAM(s) Oral before breakfast  polyethylene glycol 3350 17 Gram(s) Oral daily  povidone iodine 10% Solution 1 Application(s) Topical two times a day  senna 2 Tablet(s) Oral at bedtime  sodium chloride 0.9% lock flush 10 milliLiter(s) IV Push every 1 hour PRN      VITAL:  T(C): , Max: 38.3 (03-30-25 @ 00:20)  T(F): , Max: 100.9 (03-30-25 @ 00:20)  HR: 85 (03-30-25 @ 08:50)  BP: 109/65 (03-30-25 @ 08:50)  BP(mean): --  RR: 18 (03-30-25 @ 08:50)  SpO2: 93% (03-30-25 @ 08:50)  Wt(kg): --      PHYSICAL EXAM:  Constitutional: alert, NAD  HEENT: NCAT, DMM  Neck: Supple, No JVD  Respiratory: CTA-b/l  Cardiovascular: RRR s1s2, no m/r/g  Gastrointestinal: BS+, soft, NT/ND  : (+)suprapubic cath  Extremities: 1+ b/l LE edema  Neurological: reduced generalized strength  Back: no CVAT b/l  Skin: (+)cyanotic changes b/l feet  Access: RIJ tunneled cath (accessed)   LABS:      Na(136)/K(3.8)/Cl(101)/HCO3(23)/BUN(19)/Cr(2.52)Glu(157)/Ca(7.6)/Mg(--)/PO4(--)    03-29 @ 10:16  Na(129)/K(4.2)/Cl(94)/HCO3(24)/BUN(30)/Cr(3.21)Glu(124)/Ca(7.5)/Mg(--)/PO4(--)    03-28 @ 10:24    Urinalysis Basic - ( 29 Mar 2025 10:16 )    Color: x / Appearance: x / SG: x / pH: x  Gluc: 157 mg/dL / Ketone: x  / Bili: x / Urobili: x   Blood: x / Protein: x / Nitrite: x   Leuk Esterase: x / RBC: x / WBC x   Sq Epi: x / Non Sq Epi: x / Bacteria: x              IMPRESSION: 74M w/ polio, neurogenic bladder/suprapubic catheter, 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 HD tomorrow. family undecided on rehab at this point vs home. this would determine HD placement.     (2)Hyponatremia - Na+ improving(3/29/25)  s/p  high-Na+ bath with HD    (3)Anemia - s/p IV iron; on Retacrit with HD    (4)PAD - s/p LE bypass 3/3/25 - cyanotic changes of toes b/l - for further management after discharge    (5)CV - multifactorial LE edema; improving with increasing intradialytic UF, and with improving nutritional parameters    (6)Neuro - reduced generalized strength - getting PT/awaiting NYDIA    RECOMMEND:   (1)HD today per MWF schedule, next tomorrow  (2)retacrit with HD  (3)LE would/bypass per vascular  (4)Dose Rx for CrCl <10 ESRD MWF        Fletcher Whitehead DO   [facility]  [phone]

## 2025-04-01 NOTE — PROGRESS NOTE ADULT - SUBJECTIVE AND OBJECTIVE BOX
Date of Service: 04-01-25 @ 13:22    Patient is a 75y old  Male who presents with a chief complaint of ESRD requiring HD, uremia (01 Apr 2025 11:18)      Any change in ROS:   No new respiratory events overnight. Denies SOB/CP.      MEDICATIONS  (STANDING):  albuterol/ipratropium for Nebulization 3 milliLiter(s) Nebulizer every 6 hours  aspirin  chewable 81 milliGRAM(s) Oral daily  atorvastatin 40 milliGRAM(s) Oral at bedtime  chlorhexidine 2% Cloths 1 Application(s) Topical daily  clopidogrel Tablet 75 milliGRAM(s) Oral daily  Dakins Solution - 1/4 Strength 1 Application(s) Topical every 8 hours  epoetin aleah-epbx (RETACRIT) Injectable 35040 Unit(s) IV Push <User Schedule>  heparin   Injectable 5000 Unit(s) SubCutaneous every 8 hours  hydrOXYzine hydrochloride 25 milliGRAM(s) Oral three times a day  ibuprofen  Tablet. 400 milliGRAM(s) Oral every 12 hours  lactobacillus acidophilus 1 Tablet(s) Oral two times a day with meals  meropenem  IVPB 500 milliGRAM(s) IV Intermittent every 12 hours  mupirocin 2% Ointment 1 Application(s) Topical <User Schedule>  Nephro-margaret 1 Tablet(s) Oral daily  oxyCODONE  ER Tablet 30 milliGRAM(s) Oral every 12 hours  pantoprazole    Tablet 40 milliGRAM(s) Oral before breakfast  polyethylene glycol 3350 17 Gram(s) Oral daily  povidone iodine 10% Solution 1 Application(s) Topical two times a day  senna 2 Tablet(s) Oral at bedtime    MEDICATIONS  (PRN):  acetaminophen     Tablet .. 650 milliGRAM(s) Oral every 6 hours PRN Temp greater or equal to 38C (100.4F), Mild Pain (1 - 3)  benzocaine/menthol Lozenge 1 Lozenge Oral three times a day PRN Sore Throat  bisacodyl 5 milliGRAM(s) Oral every 12 hours PRN Constipation  HYDROmorphone  Injectable 2 milliGRAM(s) IV Push every 4 hours PRN Severe Pain (7 - 10)  oxyCODONE    IR 10 milliGRAM(s) Oral every 4 hours PRN Moderate Pain (4 - 6)  sodium chloride 0.9% lock flush 10 milliLiter(s) IV Push every 1 hour PRN Pre/post blood products, medications, blood draw, and to maintain line patency    Vital Signs Last 24 Hrs  T(C): 36.7 (01 Apr 2025 07:18), Max: 36.9 (31 Mar 2025 17:44)  T(F): 98.1 (01 Apr 2025 07:18), Max: 98.4 (31 Mar 2025 17:44)  HR: 86 (01 Apr 2025 07:18) (84 - 91)  BP: 126/58 (01 Apr 2025 07:18) (84/53 - 148/71)  BP(mean): --  RR: 18 (01 Apr 2025 07:18) (18 - 18)  SpO2: 95% (01 Apr 2025 07:18) (90% - 95%)    Parameters below as of 01 Apr 2025 07:18  Patient On (Oxygen Delivery Method): room air        I&O's Summary    31 Mar 2025 07:01  -  01 Apr 2025 07:00  --------------------------------------------------------  IN: 0 mL / OUT: 1600 mL / NET: -1600 mL          Physical Exam:   GENERAL: NAD, well-groomed, well-developed  HEENT: VINNY/   Atraumatic, Normocephalic  ENMT: No tonsillar erythema, exudates, or enlargement; Moist mucous membranes, Good dentition, No lesions  NECK: Supple, No JVD, Normal thyroid  CHEST/LUNG: few scattered rhonchi  CVS: Regular rate and rhythm; No murmurs, rubs, or gallops  GI: : Soft, Nontender, Nondistended; Bowel sounds present  NERVOUS SYSTEM:  Alert & Oriented X3  EXTREMITIES: +1 LE edema   LYMPH: No lymphadenopathy noted  SKIN: No rashes or lesions  ENDOCRINOLOGY: No Thyromegaly  PSYCH: Appropriate    Labs:                              8.8    6.14  )-----------( 240      ( 01 Apr 2025 08:37 )             30.0                         9.4    9.37  )-----------( 285      ( 31 Mar 2025 08:49 )             31.5     04-01    133[L]  |  97  |  28[H]  ----------------------------<  74  4.3   |  23  |  2.87[H]  03-31    130[L]  |  96  |  32[H]  ----------------------------<  93  4.6   |  22  |  3.63[H]  03-29    136  |  101  |  19  ----------------------------<  157[H]  3.8   |  23  |  2.52[H]    Ca    7.2[L]      01 Apr 2025 08:37  Ca    7.5[L]      31 Mar 2025 08:49      CAPILLARY BLOOD GLUCOSE      POCT Blood Glucose.: 78 mg/dL (01 Apr 2025 07:58)          Urinalysis Basic - ( 01 Apr 2025 08:37 )    Color: x / Appearance: x / SG: x / pH: x  Gluc: 74 mg/dL / Ketone: x  / Bili: x / Urobili: x   Blood: x / Protein: x / Nitrite: x   Leuk Esterase: x / RBC: x / WBC x   Sq Epi: x / Non Sq Epi: x / Bacteria: x        Studies  < from: CT Chest w/ IV Cont (03.25.25 @ 13:43) >  ACC: [id number] EXAM:  CT CHEST IC   ORDERED BY:  NELL TOLBERT     ACC: [id number] EXAM:  CT ABDOMEN AND PELVIS IC   ORDERED BY:  NELL TOLBERT     PROCEDURE DATE:  03/25/2025          INTERPRETATION:  CLINICAL INFORMATION: Evaluate for pneumonia. Abdominal   pain.    COMPARISON: CT chest 2/23/2025. CT abdomen and pelvis 2/20/2025.    CONTRAST/COMPLICATIONS:  IV Contrast: Omnipaque 350  90 cc administered   10 cc discarded  Oral Contrast: NONE    PROCEDURE:  CT of the Chest, Abdomen and Pelvis was performed.  Sagittal and coronal reformats were performed.    FINDINGS:  CHEST:  LUNGS AND LARGE AIRWAYS: Patent central airways. Small amount of debris   within the trachea. Right lower lobe consolidative opacities. Additional   consolidative opacities in the left lower lobe, possibly atelectasis. A   few ill-defined nodular opacities in the left lower lobe measuring up to   6 mm.  PLEURA: Small left pleural effusion.  VESSELS: Right IJ approach catheter with tip in the superior cavoatrial   junction. Linear nonocclusive filling defect within the right internal   jugular vein (3:1). Atherosclerotic changes. Coronary artery   calcification.  HEART: Heart size is normal. Aortic valve and mitral annular   calcification. No pericardialeffusion.  MEDIASTINUM AND MARYANNE: No lymphadenopathy.  CHEST WALL AND LOWER NECK: Within normal limits.    ABDOMEN AND PELVIS:  LIVER: Subcentimeter hypodensity in the left lobe that is too small to   characterize.  BILE DUCTS: Normal caliber.  GALLBLADDER: Cholelithiasis.  SPLEEN: Within normal limits.  PANCREAS: Within normal limits.  ADRENALS: Within normal limits.  KIDNEYS/URETERS: Bilateral mild hydroureteronephrosis to the level of the   urinary bladder without evidence of obstructive urolithiasis, not   significantly changed from the prior exam of 2/20/2025. Diffuse   urothelial thickening of the bilateral ureters and collecting systems,   also similar to the previous CT. An exophytic hypodense right renal   lesion measuring 4.1 cm is unchanged.    BLADDER: Underdistended with a suprapubic catheter in place.  REPRODUCTIVE ORGANS: Enlarged prostate. Linear densities in the prostate,   possibly from previous UroLift procedure; correlate with the patient's   surgical history.    BOWEL:Rectal wall thickening. Sigmoid anastomosis. No evidence for bowel   obstruction. Normal appendix.  PERITONEUM/RETROPERITONEUM: Within normal limits.  VESSELS: Atherosclerotic changes. Bilateral common iliac/external iliac   artery stents. Previouslydescribed bilateral external iliac and   bilateral superficial femoral artery occlusions seen on the prior CTA is   not well assessed by this exam.  LYMPH NODES: Small left para-aortic lymph node measuring 1.1 x 0.9 cm,   unchanged.  ABDOMINAL WALL: Suprapubic catheter. Subcutaneous gas and subcutaneous   infiltration tracking through the right medial buttock. No discrete   associated collection within the field-of-view..  BONES: Degenerative changes. Bilateral femoral fixation hardware.    IMPRESSION:    CHEST:  *  Right lower lobe consolidative opacities, which may represent   pneumonia. Additional consolidative opacities in the left lower lobe,   potentially atelectasis or infection. A few ill-defined nodular opacities   in the left lower lobe measuring up to 6 mm may also be   infectious/inflammatory in etiology. Suggest continued attention on   follow-up imaging.  *  Small amount of debris in the trachea.  *  Small left pleural effusion.  *  Linear nonocclusive filling defect within the right internal jugular   vein, which may represent residual fibrin sheath or nonocclusive thrombus.    ABDOMEN AND PELVIS:  *  Subcutaneous gas and infiltration tracking along the medial right   buttock, potentially related to a decubitus wound with gas tracking from   the external environment. A necrotizing airforming infection is also a   possibility. Correlate with physical exam.  *  Rectal wall thickening, suggestive of proctitis.  *  Bilateral mild hydroureteronephrosis to the level of the urinary   bladder without evidence of obstructive urolithiasis, and not   significantly changed since 2/20/2025.  *  Diffuse urothelial thickening of the bilateral renal collecting   systems and ureters, which may be seen with an ascending urinary tract   infection or inflammation, similar to the previous CT 2/20/2025.    Findings were discussed with Dr. NELL TOLBERT 3/25/2025 4:30 PM by   Dr. Calzada.    --- End of Report ---        < end of copied text >

## 2025-04-01 NOTE — PROGRESS NOTE ADULT - ASSESSMENT
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  came in after fall and for HD.     2/20 CTH neg   \Na 123 --> now 133   A1c 5.7   TSH WNL 3.46   o/e 2/21 AAOx2, uppers 4/5, lowers limited .  2/23; family bedside upset that he has pain in leg after he was moved.  patient going for imaging now.   s/p R IJ permacath by IR 2/25 2/27 AAOx3   sopoke with family bedisde 3/17 wife says mental status okay, sometimes up and down but good   3/18 was told he has "new neuropathy" spoke iwth patient and wife at bedside. states its the same from before not a new issue.    3/21 mental status stable. c/o pubic cathter   3/23 c/o nausea today   no new changes     Imrpssion  1) AMS, waxing and waing , likely multifactorial from infection, metabolic/electrolyte derrangements/ delerium / medication effect , ureemia which is imporving   2) polio  3) fall 2/2 weakness  4) hyponatremia to 123 2/20  improved 133 -->136;   back down to 129      - still on meropenum IV; if dischare planning need a plan ; f/u ID ; meropenum until 4/3   - patient wants home f/u PT   - Na 129 on most recent blood work--> 133 improved   slow correcttion  ; f/u renal    - AVF outpatient l; f/u vascular     f/u vascular; no vascluar options ; f/u with Messi from vasculare to schedule outpatient procedure   - on DAPT   - was getting oxycodone ER 30mg BID and oxy PRN IR i10 and dilauded PRN ;   consider gabapentin 200mg TID or lyrica 50mg BID fo nueorpathy if no objection ; patient states he still has pain but its better.  vascular has seen patient.  poor prognosis of LLE   - f/u psych   - limit sedating meds   - continue to monitor and correct metabolic derrangements .  - delerium precuations.   - frequent re orientation  - check b12, RPR, ammonia level if never checked    - PT/OT   - check FS, glucose control <180  - GI/DVT ppx  - Thank you for allowing me to participate in the care of this patient. Call with questions.   dc planning    spoke with wife at [facility] 3/28      Paul Limon MD  Vascular Neurology  Office: [phone]

## 2025-04-01 NOTE — PROGRESS NOTE ADULT - ASSESSMENT
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 The initial plan was that he would present to the [facility] ER Monday 2/17 for HD initiation. However, day of admission, he fell and sustained trauma to his knee. Called to consult for cough, elevated R hemidiaphragm.

## 2025-04-01 NOTE — PROGRESS NOTE ADULT - SUBJECTIVE AND OBJECTIVE BOX
INTERVAL HPI/OVERNIGHT EVENTS:    (+) bm's -- no diarrhea    MEDICATIONS  (STANDING):  albuterol/ipratropium for Nebulization 3 milliLiter(s) Nebulizer every 6 hours  aspirin  chewable 81 milliGRAM(s) Oral daily  atorvastatin 40 milliGRAM(s) Oral at bedtime  chlorhexidine 2% Cloths 1 Application(s) Topical daily  clopidogrel Tablet 75 milliGRAM(s) Oral daily  Dakins Solution - 1/4 Strength 1 Application(s) Topical every 8 hours  epoetin aleah-epbx (RETACRIT) Injectable 45641 Unit(s) IV Push <User Schedule>  heparin   Injectable 5000 Unit(s) SubCutaneous every 8 hours  hydrOXYzine hydrochloride 25 milliGRAM(s) Oral three times a day  ibuprofen  Tablet. 400 milliGRAM(s) Oral every 12 hours  lactobacillus acidophilus 1 Tablet(s) Oral two times a day with meals  meropenem  IVPB 500 milliGRAM(s) IV Intermittent every 12 hours  mupirocin 2% Ointment 1 Application(s) Topical <User Schedule>  Nephro-margaret 1 Tablet(s) Oral daily  oxyCODONE  ER Tablet 30 milliGRAM(s) Oral every 12 hours  pantoprazole    Tablet 40 milliGRAM(s) Oral before breakfast  polyethylene glycol 3350 17 Gram(s) Oral daily  povidone iodine 10% Solution 1 Application(s) Topical two times a day  senna 2 Tablet(s) Oral at bedtime    MEDICATIONS  (PRN):  acetaminophen     Tablet .. 650 milliGRAM(s) Oral every 6 hours PRN Temp greater or equal to 38C (100.4F), Mild Pain (1 - 3)  benzocaine/menthol Lozenge 1 Lozenge Oral three times a day PRN Sore Throat  bisacodyl 5 milliGRAM(s) Oral every 12 hours PRN Constipation  HYDROmorphone  Injectable 2 milliGRAM(s) IV Push every 4 hours PRN Severe Pain (7 - 10)  oxyCODONE    IR 10 milliGRAM(s) Oral every 4 hours PRN Moderate Pain (4 - 6)  sodium chloride 0.9% lock flush 10 milliLiter(s) IV Push every 1 hour PRN Pre/post blood products, medications, blood draw, and to maintain line patency      Allergies    No Known Allergies    Intolerances        Review of Systems:    General:  No wt loss, fevers, chills, night sweats, fatigue   Eyes:  Good vision, no reported pain  ENT:  No sore throat, pain, runny nose, dysphagia  CV:  No pain, palpitations, hypo/hypertension  Resp:  No dyspnea, cough, tachypnea, wheezing  GI:  No pain, No nausea, No vomiting, No diarrhea, No constipation, No weight loss, No fever, No pruritis, No rectal bleeding, No melena, No dysphagia  :  No pain, bleeding, incontinence, nocturia  Muscle:  No pain, weakness  Neuro:  No weakness, tingling, memory problems  Psych:  No fatigue, insomnia, mood problems, depression  Endocrine:  No polyuria, polydypsia, cold/heat intolerance  Heme:  No petechiae, ecchymosis, easy bruisability  Skin:  No rash, tattoos, scars, edema      Vital Signs Last 24 Hrs  T(C): 36.7 (01 Apr 2025 07:18), Max: 36.9 (31 Mar 2025 17:44)  T(F): 98.1 (01 Apr 2025 07:18), Max: 98.4 (31 Mar 2025 17:44)  HR: 86 (01 Apr 2025 07:18) (57 - 91)  BP: 126/58 (01 Apr 2025 07:18) (84/53 - 148/71)  BP(mean): --  RR: 18 (01 Apr 2025 07:18) (18 - 18)  SpO2: 95% (01 Apr 2025 07:18) (90% - 95%)    Parameters below as of 01 Apr 2025 07:18  Patient On (Oxygen Delivery Method): room air        PHYSICAL EXAM:    Constitutional: NAD  HEENT: EOMI, throat clear  Neck: No LAD, supple  Respiratory: CTA and P  Cardiovascular: S1 and S2, RRR, no M  Gastrointestinal: BS+, soft, NT/ND, neg HSM,  Extremities: No peripheral edema, neg clubbing, cyanosis  Vascular: 2+ peripheral pulses  Neurological: A/O   Psychiatric: Normal mood, normal affect  Skin: No rashes      LABS:                        8.8    6.14  )-----------( 240      ( 01 Apr 2025 08:37 )             30.0     04-01    133[L]  |  97  |  28[H]  ----------------------------<  74  4.3   |  23  |  2.87[H]    Ca    7.2[L]      01 Apr 2025 08:37        Urinalysis Basic - ( 01 Apr 2025 08:37 )    Color: x / Appearance: x / SG: x / pH: x  Gluc: 74 mg/dL / Ketone: x  / Bili: x / Urobili: x   Blood: x / Protein: x / Nitrite: x   Leuk Esterase: x / RBC: x / WBC x   Sq Epi: x / Non Sq Epi: x / Bacteria: x        RADIOLOGY & ADDITIONAL TESTS:

## 2025-04-01 NOTE — PROGRESS NOTE ADULT - SUBJECTIVE AND OBJECTIVE BOX
[facility]  SABINO Griffin Y. Patel, S. Shah, G. Casimir  [phone]  ([phone] - weekdays after 5pm and weekends)    Name: BRIAN DEMPSEY  Age/Gender: 75y Male  MRN: [id number]    Interval History:  Patient seen and examined this morning.   States he feels better today.  Asking to be left alone for the day to rest.   Notes reviewed.  No concerning overnight events  Afebrile   Allergies: No Known Allergies      Objective:  Vitals:   T(F): 98.1 (04-01-25 @ 07:18), Max: 98.4 (03-31-25 @ 17:44)  HR: 86 (04-01-25 @ 07:18) (57 - 91)  BP: 126/58 (04-01-25 @ 07:18) (84/53 - 148/71)  RR: 18 (04-01-25 @ 07:18) (18 - 18)  SpO2: 95% (04-01-25 @ 07:18) (90% - 95%)  Physical Examination:  General: no acute distress  HEENT: normocephalic, atraumatic, anicteric  Respiratory: no acc muscle use, breathing comfortably  Cardiovascular: S1 and S2 present  Gastrointestinal: nondistended, SPC  Extremities: LE ischemic changes, LE edema    Laboratory Studies:  CBC:                       8.8    6.14  )-----------( 240      ( 01 Apr 2025 08:37 )             30.0     WBC Trend:  6.14 04-01-25 @ 08:37  9.37 03-31-25 @ 08:49  8.20 03-28-25 @ 10:23    CMP: 04-01    133[L]  |  97  |  28[H]  ----------------------------<  74  4.3   |  23  |  2.87[H]    Ca    7.2[L]      01 Apr 2025 08:37    Creatinine: 2.87 mg/dL (04-01-25 @ 08:37)  Creatinine: 3.63 mg/dL (03-31-25 @ 08:49)  Creatinine: 2.52 mg/dL (03-29-25 @ 10:16)  Creatinine: 3.21 mg/dL (03-28-25 @ 10:24)    Microbiology: reviewed   03-31-25 @ 07:28 SARS-CoV-2 NotDetec/Influenza A NotDetec/Influenza B NotDetec/RSV NotDetec    Radiology: reviewed     Medications:  acetaminophen     Tablet .. 650 milliGRAM(s) Oral every 6 hours PRN  albuterol/ipratropium for Nebulization 3 milliLiter(s) Nebulizer every 6 hours  aspirin  chewable 81 milliGRAM(s) Oral daily  atorvastatin 40 milliGRAM(s) Oral at bedtime  benzocaine/menthol Lozenge 1 Lozenge Oral three times a day PRN  bisacodyl 5 milliGRAM(s) Oral every 12 hours PRN  chlorhexidine 2% Cloths 1 Application(s) Topical daily  clopidogrel Tablet 75 milliGRAM(s) Oral daily  Dakins Solution - 1/4 Strength 1 Application(s) Topical every 8 hours  epoetin aleah-epbx (RETACRIT) Injectable 71114 Unit(s) IV Push <User Schedule>  heparin   Injectable 5000 Unit(s) SubCutaneous every 8 hours  HYDROmorphone  Injectable 2 milliGRAM(s) IV Push every 4 hours PRN  hydrOXYzine hydrochloride 25 milliGRAM(s) Oral three times a day  ibuprofen  Tablet. 400 milliGRAM(s) Oral every 12 hours  lactobacillus acidophilus 1 Tablet(s) Oral two times a day with meals  meropenem  IVPB 500 milliGRAM(s) IV Intermittent every 12 hours  mupirocin 2% Ointment 1 Application(s) Topical <User Schedule>  Nephro-margaret 1 Tablet(s) Oral daily  oxyCODONE    IR 10 milliGRAM(s) Oral every 4 hours PRN  oxyCODONE  ER Tablet 30 milliGRAM(s) Oral every 12 hours  pantoprazole    Tablet 40 milliGRAM(s) Oral before breakfast  polyethylene glycol 3350 17 Gram(s) Oral daily  povidone iodine 10% Solution 1 Application(s) Topical two times a day  senna 2 Tablet(s) Oral at bedtime  sodium chloride 0.9% lock flush 10 milliLiter(s) IV Push every 1 hour PRN    Current Antimicrobials:  meropenem  IVPB 500 milliGRAM(s) IV Intermittent every 12 hours    Prior/Completed Antimicrobials:  piperacillin/tazobactam IVPB.  piperacillin/tazobactam IVPB.  piperacillin/tazobactam IVPB.-  piperacillin/tazobactam IVPB.-  trimethoprim   80 mG/sulfamethoxazole 400 mG  trimethoprim  160 mG/sulfamethoxazole 800 mG  vancomycin  IVPB  vancomycin  IVPB

## 2025-04-01 NOTE — PROGRESS NOTE ADULT - SUBJECTIVE AND OBJECTIVE BOX
Subjective: Patient seen and examined. No new events except as noted.     REVIEW OF SYSTEMS:    CONSTITUTIONAL: +weakness, fevers or chills  EYES/ENT: No visual changes;  No vertigo or throat pain   NECK: No pain or stiffness  RESPIRATORY: No cough, wheezing, hemoptysis; No shortness of breath  CARDIOVASCULAR: No chest pain or palpitations  GASTROINTESTINAL: No abdominal or epigastric pain. No nausea, vomiting, or hematemesis; No diarrhea or constipation. No melena or hematochezia.  GENITOURINARY: No dysuria, frequency or hematuria  NEUROLOGICAL: No numbness or weakness  SKIN: No itching, burning, rashes, or lesions   All other review of systems is negative unless indicated above.    MEDICATIONS:  MEDICATIONS  (STANDING):  albuterol/ipratropium for Nebulization 3 milliLiter(s) Nebulizer every 6 hours  aspirin  chewable 81 milliGRAM(s) Oral daily  atorvastatin 40 milliGRAM(s) Oral at bedtime  chlorhexidine 2% Cloths 1 Application(s) Topical daily  clopidogrel Tablet 75 milliGRAM(s) Oral daily  Dakins Solution - 1/4 Strength 1 Application(s) Topical every 8 hours  epoetin aleah-epbx (RETACRIT) Injectable 02432 Unit(s) IV Push <User Schedule>  heparin   Injectable 5000 Unit(s) SubCutaneous every 8 hours  hydrOXYzine hydrochloride 25 milliGRAM(s) Oral three times a day  ibuprofen  Tablet. 400 milliGRAM(s) Oral every 12 hours  lactobacillus acidophilus 1 Tablet(s) Oral two times a day with meals  meropenem  IVPB 500 milliGRAM(s) IV Intermittent every 12 hours  mupirocin 2% Ointment 1 Application(s) Topical <User Schedule>  Nephro-margaret 1 Tablet(s) Oral daily  oxyCODONE  ER Tablet 30 milliGRAM(s) Oral every 12 hours  pantoprazole    Tablet 40 milliGRAM(s) Oral before breakfast  polyethylene glycol 3350 17 Gram(s) Oral daily  povidone iodine 10% Solution 1 Application(s) Topical two times a day  senna 2 Tablet(s) Oral at bedtime      PHYSICAL EXAM:  T(C): 36.7 (04-01-25 @ 07:18), Max: 36.9 (03-31-25 @ 17:44)  HR: 86 (04-01-25 @ 07:18) (57 - 91)  BP: 126/58 (04-01-25 @ 07:18) (84/53 - 148/71)  RR: 18 (04-01-25 @ 07:18) (18 - 18)  SpO2: 95% (04-01-25 @ 07:18) (90% - 95%)  Wt(kg): --  I&O's Summary    31 Mar 2025 07:01  -  01 Apr 2025 07:00  --------------------------------------------------------  IN: 0 mL / OUT: 1600 mL / NET: -1600 mL          Appearance: NAD  HEENT:  Dry oral mucosa, PERRL, EOMI	  Lymphatic: No lymphadenopathy  Cardiovascular: Normal S1 S2, No JVD, No murmurs, No edema  Respiratory: Lungs clear to auscultation	  Psychiatry: A & O x 3, Mood & affect appropriate  Skin: No rashes, No ecchymoses, No cyanosis	  Neurologic: Non-focal  GI/Abd: Soft, obese, nontender. Dressing to [facility] C/D/I. Suprapubic catheter in place  Ext: Palp femoral pulses bilat. BLE atrophic, minimal dorsi/plantarflexion bilaterally. Sensation grossly intact. LLE edematous compared to R, erythema to right toes/forefoot. mottling of right toes/forefoot/heel  LLE:  small superficial abrasion, +edema and +ecchymosis over L knee  +TTP over L knee, no TTP along remainder of extremity; compartments soft  Limited ROM at knee 2/2 pain  No gross varus/valgus laxity, but assessment limited 2/2 pain  Motor: TA/EHL/GS/FHL intact  Sensory: DP/SP/Tib/Jordan/Saph SILT  +DP pulse (symmetric relative to contralateral side), WWP   pressure wound from the LLE immobilizer posteriorly proximal to the knee.  Vascular: Peripheral pulses palpable 2+ bilaterally        LABS:    CARDIAC MARKERS:                                8.8    6.14  )-----------( 240      ( 01 Apr 2025 08:37 )             30.0     04-01    133[L]  |  97  |  28[H]  ----------------------------<  74  4.3   |  23  |  2.87[H]    Ca    7.2[L]      01 Apr 2025 08:37      proBNP:   Lipid Profile:   HgA1c:   TSH:             TELEMETRY: 	    ECG:  	  RADIOLOGY:   DIAGNOSTIC TESTING:  [ ] Echocardiogram:  [ ]  Catheterization:  [ ] Stress Test:    OTHER:

## 2025-04-01 NOTE — PROGRESS NOTE ADULT - ASSESSMENT
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 He is well known to me from multiple recent admisions  s/p admissions at [facility] 12/20-12/25/24 and 2/4-2/7 with SONDRA on CKD with associated uremic symptoms.   At the last admission he had expressed strong interest to try to hold off with HD intiation but he has been getting worse clinically at home.  His SONDRA and uremic symptoms significantly improved after the first admission; they improved a to mild extent during the 2nd admission to the point where he was able to be discharged without HD. Since then, however, he has worsened further.   He has been suffering from progressively worsening diffuse pruritus, loss of appetite, and generalized weakness and falls.   His nephrologist and PCP has been speaking with him and his family over the past few days,   The initial plan was that he would present to the [facility] ER Monday 2/17 (ie tomorrow) for HD initiation. Today, however, he fell and sustained trauma to his knee. Given the fall and concern for knee fracture, he presented to the ER today. multiple xrays show no Fractures.      CKD5, uremia, requiring initiation of HD  Rash, seborrheic dermatitis  Pruritis  Anemia  PAD  SP catheter, chronic  Left inferior pole acute on chronic patella Fx    plan  - HD via R subclavian permacath  -4/1: ptn is frustrated about a prolonged hospitalization but states he feels better overall and once medically cleared he will be ready to go home, not sooner than 4/7. completing ABx on 4/3.   -3/31: at HD today had 1.7 L UF, post HD was hypotense, gave gentle IVF , BP still a bit low. afebrile, loose BMs but not diarrhea, GI PCR not sent, RVP neg. . on Meropenem course for total of 10 days, last day 4/3, would care for decubiti and left knee wound post trauma  -3/30: Tmax 100.9, c/o dry cough and diarrhea but not watery. pain is controlled at the time of visit. wife at bedside. will check RVP panel, GI PCR, sine diarrhea non watery will not order C. Diff. ID to follow  - 3/29: cont meropenem, seen by coverage  - 3/28: pain from decubitus ulcer and Left knee post debridement continues. wound care recs in place. on Meropenem for PNA, pulm and ID following. HD as per renal. HDS  -3/27: ptn is complaining that pain post decubital and left knee debridement has been severe. his daughter is on speaker phone, wife is at bedside. his pain is controlled when meds are administered. he is very uncomfortable due to the pain at the site of decubitus debridement. lyrica helped his pain before but made him drowsy, griffin with gabapentin. he doesnt want them to be resumed. will cont dilaudid, oxycodone, oxycontin, ibuprofen. he is on Meropenem for aspiration PNA/HCAP. [facility] for DVT ppx. seen by vascular cardiology to address R IJ post shiley non occlusive DVT. full AC was not recommended. will check rpt doppler in 3-4 weeks.   - 3/26: Wound care performed bedside debridement of Left knee wound 2/2 lifted eschar, and sacral decubitus. findings c/w possible infection and mainly fat necrosis. cont Meropenem. ID following. podiatry following for gangrenous toes. will give additional single dose IV DIlaudid 2/2 severe pain post debridement.    - 3/25: wife at bedside, daughter on speaker phone at bedside. ptn is pain free at the time of visit and states he wants to cont the pain regiment he is presently on. ct C/A/P revealed: RLL PNA, prob aspiration, stercoral proctitis, decubitus ulcer w possible progression to sacral OM, R IJ nonocclusive DVT. these findings d/w ptn ,his family, wound care, ACP, Vascular, Nephrology, ID, pUlm, GI. meropenem initiated, Bactrim stopped. ptn states when he is cleared for DC, he wants to go home , not to [facility], not to SNF. daughter and wife aware.   -3/24: ptn  was seen by GI for N/V, its not clear the etiology, will obtain Ct C/A/P. ptn states he is coughing up green mucus as well  -3/23:  ptn is in good spirits, says he vomited "spit" this am, but tolerated all meals without vomiting. no abd pain, no undigested food in the vomit. afebrile, no diarrhea, RVP neg. GI consulted.   -3/21-22: ptn feels well.  pain is controlled. still no accepting facility for [facility]  -3/20: ptn states he has severe low back and LLE pain though overall is improving.   -3/19: ptn w tan crusting around SPC , states its painful. started on po Bactrim, renally dosed by ID for cellulitis.   -3/18: LLE paraesthesias are chronic, will d/w CM re dc planning to [facility]    -3/17: ptn states he is lethargic, he has LLE numbness which is new and severe pain at SPC insertion site. this was ID, d/w neuro, renal and vascular. as per ID: no sign of an acute infection recommend CT A/P.   -3/16:  urine cx from 3/15 NTD, zosyn DCed, awaiting dc to [facility], not sure will be able to go to [facility] since there is an insurance coverage conflict. HD as per renal  - 3/15: spoke w ptn's daughter today re denial from [place] for [facility]. ptn has [employer] medicare advantage plan, which [facility] doesnt accept. i recommended to speak  to  and to the insurance company to find participating facilities. ptn is stable today. pain and erythema at SP catheter site has resolved today. seen by ID, will cont ZOSYN for now. UCx sent.   - 3/14: suprapubic tube changhed by BETTY, ptn has crusting at the insertion site and pain. will start Abx, urine always has sediment, will sent for cx, start Vanco/ZOsyn. ID consult called. check MRSA/MSSA PCR  -3/13: ptn is doing well. ptn has an accepting rehab facility, now pending AUTH.   -3/12: ptn is no longer constipated. doing well off prn IV DIlaudid. awaiting dc to [facility]  -3/11: ptn has no new c/o other than feeling constipated, lactulose ordered. also in preparation for [facility] will dc prn IV DIlaudid, cont prn OXy IR  -3/10:  ptn is awake, alert, wife at bedside, i instructed them to give rehab choices. also awaiting SPC change to be done by urology. pain is controlled  -3/9: seen by PT, will refer to [facility]. choices to be given in AM. this was d/w ptn, daughter, wife.   -3/8:  ptn didnt want to get PT eval done, will reorder. ptn needs [facility] placement. PMNR consult ordered  - 3/7: ptn is alert , pain is controlled, DC planning d/w ptn and HCP, daughter Yanique  -3/6:  ptn is awake, alert, ecchymotic feet look improved, pain is controlled. DC planning to [facility]  3/4-5 - s/p b/l iliac arteries angioplasty and stent placements on 3/3. today has new ecchymoses in b/l LE, concern for arterial insufficiency. vascular aware.. wound from Left knee immobilizer is dressed and healing. pain is controlled.   LEFT UE AVF placement/scheduling will be on outptn basis as per vascular. dc planning to [facility]  - 3/3: ptn is s/p angiogram RLE : b/l iliac arteries w successful angioplasty and stents. in PACU. awaitng HD.  ptn has a pressure wound from the LLE immobilizer posteriorly proximal to the knee. its been on 2/2 knee fracture, applied by ortho and managed by ptn's wife as per ptn's and wife's request , this was d/w nursing and nurse manager ms Ruiz.. immobilizer should be managed by orthopedics and nursing. will keep it off, only keep on when repositioning for care and then remove. wound care to F/U . this was discussed at length w daughter Yanique and wife Dee Dee.   -3/1-3/2: ptn is smiling, pain free, had HD 2/28 and 3/1, awaiting RLE angiogram 3/3, on Heparin drip, euvolemic  -2/28: ptn is lethargic, awaiting RLE angiogram possible fem-fem bypass hopefully on 3/3 pending OR availability, awaiting HD today    -2/27:  plan for angiogram in am with possible angioplasty, possible stent, possible fem-fem bypass. medically cleared for angiogram and possible surgery, stent, angioplasty. pain is controlled. remains on heparin drip as per vascular. HD as per renal    -2/26:  ptn is sleeping, pain is controlled, he was seen by wound care and vascular attending. planning on angiogram 2/2 severe PAD in LE on CTA. he also spoke to HCP. ptn and HCP in agreement. ptn was started on HEPARIN drip as per vascular recs. RIJ permacath done 2/25    - 2/25: ptn states he is hallucinating, but not at present, his MS is at baseline, his pain is controlled, he still needs prn pain meds when he is moved in the bed or for testing or for HD. awaiting Permacath, AVF creation, LE bypass to be d/w Dr. Swenson and the ptn tomorrow. ptn has cardiology clearance  if he opts for. Ptn has sacral decubiti and posterior thigh and buttock decibiti and b/l heel decubiti. Z flow fabienne d/w RN, get wound care consult    -2/24: pain is controlled  if the LLE is immobile and fully extended. doesn't tolerate the knee immobilizer. has a medium condyle and acute on chronic patella fx in LEFT knee. as per vascular ptn will need iliac stent  w a fem-fem bypass, possible axillo-femoral bypass. for this surgery he would need cardiac work up . more pressing surgery is LUE AVF creation,  in IR will arrange for Permacath. for pain control: Motrin 400 mg q12H, Oxy ER 30 mg q12H, Oxy IR 10 for mod pain and dilaudid 2 mg iv for severe pain. still has pruritis though improved, will raise atarax 25 mg to tid. plan of care d/w daughter Norma Persaud , ptn and his wife. Also d/w renal, card, vascular, ACP    -2/23: Daughter Yanique is the HCP, she and the ptn filled out the paperwork. daily plan and findings d/w her and the ptn. MS is at abseline, c/o b/l LE foot pain and Left Knee pain. xrays of left knee: cannot r/o acute on chronic inferior pole patellar Fx. knee immobilizer is on, awaiting ortho consult. doubt needs any intervention awaiting Ct chest today, will add CT Left knee. ptn states itching has recurred, severe pain has recurred. will resume Atatrax ( 25 mg bid) and Oxycodone ER , but at a lower dose 15 mg q12H. cont prn analgesics. HD as per renal, awaiting Vascular consult f/u re CTA A/L &LE and recs. ptn also wants AVF surgery on this admission. Coughing has stopped    - 2/22: ptn is drowsy but arousable, calmer today, answers questions appropriately. he is pain free, he stopped coughing, he denies having pruritis: will DC:  OXY ER, Atarax, Tessalon perles.     -  2/21: ptn is tearful, a bit confused, coughing, recognizes me and family at bedside. GOC d/w daughter and wife. AMS prob delirium 2/2 acute illness +/- opiods, lyrica, atarac. will lower atarak to tid, dc lyrica, cont Oxy 2/2 ptn has severe LE pain. neuro called for eval. Head ct done yesterday w no acute findings. CTA A/P w LE run fof: severe PAD, vascular to follow up on plan fo care. plan for HD tomorrow and next week place on tiw schedule of MWF. will need tunneled catheter prior to DC and will d/w vascular scheduling of AVF. ptn wants all to be done while inptn. daughter wants to be HCP as per ptn's wishes. she was given paperwork to get it signed and witnessed and will present to nursing thereafter. details of findings and complaints and plan of care d/w daughter and wife. spent 60 min. RVP ordered. start mucinex , tessalon perles, duonebs, get CT chest to r/o PNA. pulm called    -  2/20:  ptn had RRT 2/2 AMS and tremors. no SZ, no LOC. noted to have Na 123( hyponatremia), given 500 cc NS. Gabapentin DCed. ptn had been taking gabapentin at home PTA. Pain is controlled. Pruritis resolved, tolerating HD otherwise.     - Pain management:  cont Oxycodone 10 IR q6H,  DIlaudid prn severe pain 2 mg q4H prn.   - cont outptn meds  - DVT ppx w HSC

## 2025-04-01 NOTE — PROGRESS NOTE ADULT - SUBJECTIVE AND OBJECTIVE BOX
Neurology      S; patient seen. no neuro changes    Medications: MEDICATIONS  (STANDING):  albuterol/ipratropium for Nebulization 3 milliLiter(s) Nebulizer every 6 hours  aspirin  chewable 81 milliGRAM(s) Oral daily  atorvastatin 40 milliGRAM(s) Oral at bedtime  chlorhexidine 2% Cloths 1 Application(s) Topical daily  clopidogrel Tablet 75 milliGRAM(s) Oral daily  Dakins Solution - 1/4 Strength 1 Application(s) Topical every 8 hours  epoetin aleah-epbx (RETACRIT) Injectable 50631 Unit(s) IV Push <User Schedule>  heparin   Injectable 5000 Unit(s) SubCutaneous every 8 hours  hydrOXYzine hydrochloride 25 milliGRAM(s) Oral three times a day  ibuprofen  Tablet. 400 milliGRAM(s) Oral every 12 hours  lactobacillus acidophilus 1 Tablet(s) Oral two times a day with meals  meropenem  IVPB 500 milliGRAM(s) IV Intermittent every 12 hours  mupirocin 2% Ointment 1 Application(s) Topical <User Schedule>  Nephro-margaret 1 Tablet(s) Oral daily  oxyCODONE  ER Tablet 30 milliGRAM(s) Oral every 12 hours  pantoprazole    Tablet 40 milliGRAM(s) Oral before breakfast  polyethylene glycol 3350 17 Gram(s) Oral daily  povidone iodine 10% Solution 1 Application(s) Topical two times a day  senna 2 Tablet(s) Oral at bedtime    MEDICATIONS  (PRN):  acetaminophen     Tablet .. 650 milliGRAM(s) Oral every 6 hours PRN Temp greater or equal to 38C (100.4F), Mild Pain (1 - 3)  benzocaine/menthol Lozenge 1 Lozenge Oral three times a day PRN Sore Throat  bisacodyl 5 milliGRAM(s) Oral every 12 hours PRN Constipation  HYDROmorphone  Injectable 2 milliGRAM(s) IV Push every 4 hours PRN Severe Pain (7 - 10)  oxyCODONE    IR 10 milliGRAM(s) Oral every 4 hours PRN Moderate Pain (4 - 6)  sodium chloride 0.9% lock flush 10 milliLiter(s) IV Push every 1 hour PRN Pre/post blood products, medications, blood draw, and to maintain line patency       Vitals:  Vital Signs Last 24 Hrs  T(C): 36.7 (01 Apr 2025 07:18), Max: 36.9 (31 Mar 2025 17:44)  T(F): 98.1 (01 Apr 2025 07:18), Max: 98.4 (31 Mar 2025 17:44)  HR: 86 (01 Apr 2025 07:18) (57 - 91)  BP: 126/58 (01 Apr 2025 07:18) (84/53 - 148/71)  BP(mean): --  RR: 18 (01 Apr 2025 07:18) (18 - 18)  SpO2: 95% (01 Apr 2025 07:18) (90% - 95%)    Parameters below as of 01 Apr 2025 07:18  Patient On (Oxygen Delivery Method): room air            General Appearance: Appropriately dressed and in no acute distress       Head: Normocephalic, atraumatic and no dysmorphic features  Ear, Nose, and Throat: Moist mucous membranes  CVS: S1S2+  Resp: No SOB, no wheeze or rhonchi  GI: soft NT/ND  Extremities: LE PVD : dusky toes noted bilaterally L>R   Skin: + decub      Neurological Exam:  Mental Status: Awake, alert and oriented x 2.  Able to follow simple and complex verbal commands. Able to name and repeat. fluent speech. No obvious aphasia or dysarthria noted.   Cranial Nerves: PERRL, EOMI, VFFC, sensation V1-V3 intact,  no obvious facial asymmetry, equal elevation of palate, scm/trap 5/5, tongue is midline on protrusion. no obvious papilledema on fundoscopic exam. hearing is grossly intact.   Motor: Normal bulk, tone and strength throughout uppers 4/ lowers 0-/1 5   Sensation: Intact to light touch and pinprick throughout.     Coordination: No dysmetria on FNF   Gait: deferred, wheelchair bound     Data/Labs/Imaging which I personally reviewed.        LABS:                          8.8    6.14  )-----------( 240      ( 01 Apr 2025 08:37 )             30.0     04-01    133[L]  |  97  |  28[H]  ----------------------------<  74  4.3   |  23  |  2.87[H]    Ca    7.2[L]      01 Apr 2025 08:37          Urinalysis Basic - ( 01 Apr 2025 08:37 )    Color: x / Appearance: x / SG: x / pH: x  Gluc: 74 mg/dL / Ketone: x  / Bili: x / Urobili: x   Blood: x / Protein: x / Nitrite: x   Leuk Esterase: x / RBC: x / WBC x   Sq Epi: x / Non Sq Epi: x / Bacteria: x          < from: CT Head No Cont (02.20.25 @ 18:47) >    ACC: [id number] EXAM:  CT BRAIN   ORDERED BY: GUY DE LA CRUZ     PROCEDURE DATE:  02/20/2025          INTERPRETATION:  CLINICAL INDICATION: Altered mental status    TECHNIQUE: Axial CT scanning of the brain was obtained from the skull   base to the vertex without the administration of intravenous contrast.   Reformatted coronal and sagittal images were subsequently obtained and   reviewed.    COMPARISON: None    FINDINGS:  There is no CT evidence of acute transcortical infarct. Age-related   involutional changes and chronic microvascular ischemic changes.    There is no hydrocephalus, mass effect, or acute intracranial hemorrhage.   No extra-axial collection. Basal cisterns are patent.    The visualized paranasal sinuses and mastoid air cells are clear.    The calvarium is intact.    IMPRESSION:  No evidence of acute transcortical infarct, acute intracranial   hemorrhage, or mass effect.    --- End of Report ---            CORTNEY REARDON MD; Attending Radiologist  This document has been electronically signed. Feb 20 2025  7:07PM    < end of copied text >

## 2025-04-02 NOTE — PROGRESS NOTE ADULT - SUBJECTIVE AND OBJECTIVE BOX
INTERVAL HPI/OVERNIGHT EVENTS:    frustrated still in hospital otherwise offers no acute gi complaints   tolerating PO    MEDICATIONS  (STANDING):  albuterol/ipratropium for Nebulization 3 milliLiter(s) Nebulizer every 6 hours  aspirin  chewable 81 milliGRAM(s) Oral daily  atorvastatin 40 milliGRAM(s) Oral at bedtime  chlorhexidine 2% Cloths 1 Application(s) Topical daily  clopidogrel Tablet 75 milliGRAM(s) Oral daily  Dakins Solution - 1/4 Strength 1 Application(s) Topical every 8 hours  epoetin aleah-epbx (RETACRIT) Injectable 53770 Unit(s) IV Push <User Schedule>  heparin   Injectable 5000 Unit(s) SubCutaneous every 8 hours  hydrOXYzine hydrochloride 25 milliGRAM(s) Oral three times a day  ibuprofen  Tablet. 400 milliGRAM(s) Oral every 12 hours  lactobacillus acidophilus 1 Tablet(s) Oral two times a day with meals  meropenem  IVPB 500 milliGRAM(s) IV Intermittent every 12 hours  mupirocin 2% Ointment 1 Application(s) Topical <User Schedule>  Nephro-margaret 1 Tablet(s) Oral daily  oxyCODONE  ER Tablet 30 milliGRAM(s) Oral every 12 hours  pantoprazole    Tablet 40 milliGRAM(s) Oral before breakfast  polyethylene glycol 3350 17 Gram(s) Oral daily  povidone iodine 10% Solution 1 Application(s) Topical two times a day  senna 2 Tablet(s) Oral at bedtime    MEDICATIONS  (PRN):  acetaminophen     Tablet .. 650 milliGRAM(s) Oral every 6 hours PRN Temp greater or equal to 38C (100.4F), Mild Pain (1 - 3)  benzocaine/menthol Lozenge 1 Lozenge Oral three times a day PRN Sore Throat  bisacodyl 5 milliGRAM(s) Oral every 12 hours PRN Constipation  HYDROmorphone  Injectable 2 milliGRAM(s) IV Push every 4 hours PRN Severe Pain (7 - 10)  oxyCODONE    IR 10 milliGRAM(s) Oral every 4 hours PRN Moderate Pain (4 - 6)  sodium chloride 0.9% lock flush 10 milliLiter(s) IV Push every 1 hour PRN Pre/post blood products, medications, blood draw, and to maintain line patency      Allergies    No Known Allergies    Intolerances        Review of Systems:    General:  No wt loss, fevers, chills, night sweats, fatigue   Eyes:  Good vision, no reported pain  ENT:  No sore throat, pain, runny nose, dysphagia  CV:  No pain, palpitations, hypo/hypertension  Resp:  No dyspnea, cough, tachypnea, wheezing  GI:  No pain, No nausea, No vomiting, No diarrhea, No constipation, No weight loss, No fever, No pruritis, No rectal bleeding, No melena, No dysphagia  :  No pain, bleeding, incontinence, nocturia  Muscle:  No pain, weakness  Neuro:  No weakness, tingling, memory problems  Psych:  No fatigue, insomnia, mood problems, depression  Endocrine:  No polyuria, polydypsia, cold/heat intolerance  Heme:  No petechiae, ecchymosis, easy bruisability  Skin:  No rash, tattoos, scars, edema      Vital Signs Last 24 Hrs  T(C): 36.1 (02 Apr 2025 13:11), Max: 36.8 (01 Apr 2025 15:41)  T(F): 97 (02 Apr 2025 13:11), Max: 98.3 (01 Apr 2025 15:41)  HR: 91 (02 Apr 2025 13:11) (88 - 91)  BP: 124/62 (02 Apr 2025 13:11) (112/61 - 166/81)  BP(mean): --  RR: 17 (02 Apr 2025 13:11) (17 - 18)  SpO2: 93% (02 Apr 2025 13:11) (93% - 97%)    Parameters below as of 02 Apr 2025 13:11  Patient On (Oxygen Delivery Method): room air        PHYSICAL EXAM:    Constitutional: NAD  HEENT: EOMI, throat clear  Neck: No LAD, supple  Respiratory: CTA and P  Cardiovascular: S1 and S2, RRR, no M  Gastrointestinal: BS+, soft, NT/ND, neg HSM,  Extremities: No peripheral edema, neg clubbing, cyanosis  Vascular: 2+ peripheral pulses  Neurological: A/O   Psychiatric: Normal mood, normal affect  Skin: No rashes      LABS:                        8.8    6.14  )-----------( 240      ( 01 Apr 2025 08:37 )             30.0     04-01    133[L]  |  97  |  28[H]  ----------------------------<  74  4.3   |  23  |  2.87[H]    Ca    7.2[L]      01 Apr 2025 08:37        Urinalysis Basic - ( 01 Apr 2025 08:37 )    Color: x / Appearance: x / SG: x / pH: x  Gluc: 74 mg/dL / Ketone: x  / Bili: x / Urobili: x   Blood: x / Protein: x / Nitrite: x   Leuk Esterase: x / RBC: x / WBC x   Sq Epi: x / Non Sq Epi: x / Bacteria: x        RADIOLOGY & ADDITIONAL TESTS:

## 2025-04-02 NOTE — PROGRESS NOTE ADULT - SUBJECTIVE AND OBJECTIVE BOX
Subjective: Patient seen and examined. No new events except as noted.     REVIEW OF SYSTEMS:    CONSTITUTIONAL: No weakness, fevers or chills  EYES/ENT: No visual changes;  No vertigo or throat pain   NECK: No pain or stiffness  RESPIRATORY: No cough, wheezing, hemoptysis; No shortness of breath  CARDIOVASCULAR: No chest pain or palpitations  GASTROINTESTINAL: No abdominal or epigastric pain. No nausea, vomiting, or hematemesis; No diarrhea or constipation. No melena or hematochezia.  GENITOURINARY: No dysuria, frequency or hematuria  NEUROLOGICAL: No numbness or weakness  SKIN: No itching, burning, rashes, or lesions   All other review of systems is negative unless indicated above.    MEDICATIONS:  MEDICATIONS  (STANDING):  albuterol/ipratropium for Nebulization 3 milliLiter(s) Nebulizer every 6 hours  aspirin  chewable 81 milliGRAM(s) Oral daily  atorvastatin 40 milliGRAM(s) Oral at bedtime  chlorhexidine 2% Cloths 1 Application(s) Topical daily  clopidogrel Tablet 75 milliGRAM(s) Oral daily  Dakins Solution - 1/4 Strength 1 Application(s) Topical every 8 hours  epoetin aleah-epbx (RETACRIT) Injectable 06569 Unit(s) IV Push <User Schedule>  heparin   Injectable 5000 Unit(s) SubCutaneous every 8 hours  hydrOXYzine hydrochloride 25 milliGRAM(s) Oral three times a day  ibuprofen  Tablet. 400 milliGRAM(s) Oral every 12 hours  lactobacillus acidophilus 1 Tablet(s) Oral two times a day with meals  meropenem  IVPB 500 milliGRAM(s) IV Intermittent every 12 hours  mupirocin 2% Ointment 1 Application(s) Topical <User Schedule>  Nephro-margaret 1 Tablet(s) Oral daily  oxyCODONE  ER Tablet 30 milliGRAM(s) Oral every 12 hours  pantoprazole    Tablet 40 milliGRAM(s) Oral before breakfast  polyethylene glycol 3350 17 Gram(s) Oral daily  povidone iodine 10% Solution 1 Application(s) Topical two times a day  senna 2 Tablet(s) Oral at bedtime      PHYSICAL EXAM:  T(C): 36.5 (04-02-25 @ 00:55), Max: 36.8 (04-01-25 @ 15:41)  HR: 91 (04-02-25 @ 08:46) (88 - 91)  BP: 122/58 (04-02-25 @ 08:46) (112/61 - 166/81)  RR: 18 (04-02-25 @ 00:55) (17 - 18)  SpO2: 97% (04-02-25 @ 00:55) (95% - 97%)  Wt(kg): --  I&O's Summary    02 Apr 2025 07:01  -  02 Apr 2025 11:25  --------------------------------------------------------  IN: 200 mL / OUT: 0 mL / NET: 200 mL          Appearance: NAD  HEENT:  Dry oral mucosa, PERRL, EOMI	  Lymphatic: No lymphadenopathy  Cardiovascular: Normal S1 S2, No JVD, No murmurs, No edema  Respiratory: Lungs clear to auscultation	  Psychiatry: A & O x 3, Mood & affect appropriate  Skin: No rashes, No ecchymoses, No cyanosis	  Neurologic: Non-focal  GI/Abd: Soft, obese, nontender. Dressing to [facility] C/D/I. Suprapubic catheter in place  Ext: Palp femoral pulses bilat. BLE atrophic, minimal dorsi/plantarflexion bilaterally. Sensation grossly intact. LLE edematous compared to R, erythema to right toes/forefoot. mottling of right toes/forefoot/heel  LLE:  small superficial abrasion, +edema and +ecchymosis over L knee  +TTP over L knee, no TTP along remainder of extremity; compartments soft  Limited ROM at knee 2/2 pain  No gross varus/valgus laxity, but assessment limited 2/2 pain  Motor: TA/EHL/GS/FHL intact  Sensory: DP/SP/Tib/Jordan/Saph SILT  +DP pulse (symmetric relative to contralateral side), WWP   pressure wound from the LLE immobilizer posteriorly proximal to the knee.  Vascular: Peripheral pulses palpable 2+ bilaterally        LABS:    CARDIAC MARKERS:                                8.8    6.14  )-----------( 240      ( 01 Apr 2025 08:37 )             30.0     04-01    133[L]  |  97  |  28[H]  ----------------------------<  74  4.3   |  23  |  2.87[H]    Ca    7.2[L]      01 Apr 2025 08:37      proBNP:   Lipid Profile:   HgA1c:   TSH:             TELEMETRY: 	    ECG:  	  RADIOLOGY:   DIAGNOSTIC TESTING:  [ ] Echocardiogram:  [ ]  Catheterization:  [ ] Stress Test:    OTHER:

## 2025-04-02 NOTE — PROGRESS NOTE ADULT - PROBLEM SELECTOR PLAN 1
CT chest with new RLL consolidation and LLL opacities, tracheal debris  -Suspect aspiration related s/p episode of vomiting  -Continue ABX as per ID  -Continue bronchodilators  -Keep sats >90% with o2 PRN  -Pt with low grade temp 3/30, dry cough. RVP negative, now afebrile. Continue to monitor fever curve.

## 2025-04-02 NOTE — PROGRESS NOTE ADULT - ASSESSMENT
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 HD tomorrow. family undecided on rehab at this point vs home. this would determine HD placement.     (2)Hyponatremia - Na+ improving(3/29/25)  s/p  high-Na+ bath with HD    (3)Anemia - s/p IV iron; on Retacrit with HD    (4)PAD - s/p LE bypass 3/3/25 - cyanotic changes of toes b/l - for further management after discharge    (5)CV - multifactorial LE edema; improving with increasing intradialytic UF, and with improving nutritional parameters    (6)Neuro - reduced generalized strength - getting PT/awaiting NYDIA    RECOMMEND:   (1)HD today per MWF schedul  (2)retacrit with HD  (3)LE wound/bypass per vascular  (4)Dose Rx for CrCl <10 ESRD MWF    Beryl Darden DNP  [facility]  [phone]

## 2025-04-02 NOTE — PROGRESS NOTE ADULT - SUBJECTIVE AND OBJECTIVE BOX
Date of Service: 04-02-25 @ 10:24    Patient is a 75y old  Male who presents with a chief complaint of ESRD requiring HD, uremia (01 Apr 2025 20:52)      Any change in ROS:   Denies CP, SOB  O2 sats 97% on room air  Dry cough at times, unchanged     MEDICATIONS  (STANDING):  albuterol/ipratropium for Nebulization 3 milliLiter(s) Nebulizer every 6 hours  aspirin  chewable 81 milliGRAM(s) Oral daily  atorvastatin 40 milliGRAM(s) Oral at bedtime  chlorhexidine 2% Cloths 1 Application(s) Topical daily  clopidogrel Tablet 75 milliGRAM(s) Oral daily  Dakins Solution - 1/4 Strength 1 Application(s) Topical every 8 hours  epoetin aleah-epbx (RETACRIT) Injectable 11487 Unit(s) IV Push <User Schedule>  heparin   Injectable 5000 Unit(s) SubCutaneous every 8 hours  hydrOXYzine hydrochloride 25 milliGRAM(s) Oral three times a day  ibuprofen  Tablet. 400 milliGRAM(s) Oral every 12 hours  lactobacillus acidophilus 1 Tablet(s) Oral two times a day with meals  meropenem  IVPB 500 milliGRAM(s) IV Intermittent every 12 hours  mupirocin 2% Ointment 1 Application(s) Topical <User Schedule>  Nephro-margaret 1 Tablet(s) Oral daily  oxyCODONE  ER Tablet 30 milliGRAM(s) Oral every 12 hours  pantoprazole    Tablet 40 milliGRAM(s) Oral before breakfast  polyethylene glycol 3350 17 Gram(s) Oral daily  povidone iodine 10% Solution 1 Application(s) Topical two times a day  senna 2 Tablet(s) Oral at bedtime    MEDICATIONS  (PRN):  acetaminophen     Tablet .. 650 milliGRAM(s) Oral every 6 hours PRN Temp greater or equal to 38C (100.4F), Mild Pain (1 - 3)  benzocaine/menthol Lozenge 1 Lozenge Oral three times a day PRN Sore Throat  bisacodyl 5 milliGRAM(s) Oral every 12 hours PRN Constipation  HYDROmorphone  Injectable 2 milliGRAM(s) IV Push every 4 hours PRN Severe Pain (7 - 10)  oxyCODONE    IR 10 milliGRAM(s) Oral every 4 hours PRN Moderate Pain (4 - 6)  sodium chloride 0.9% lock flush 10 milliLiter(s) IV Push every 1 hour PRN Pre/post blood products, medications, blood draw, and to maintain line patency    Vital Signs Last 24 Hrs  T(C): 36.5 (02 Apr 2025 00:55), Max: 36.8 (01 Apr 2025 15:41)  T(F): 97.7 (02 Apr 2025 00:55), Max: 98.3 (01 Apr 2025 15:41)  HR: 91 (02 Apr 2025 08:46) (88 - 91)  BP: 122/58 (02 Apr 2025 08:46) (112/61 - 166/81)  BP(mean): --  RR: 18 (02 Apr 2025 00:55) (17 - 18)  SpO2: 97% (02 Apr 2025 00:55) (95% - 97%)    Parameters below as of 02 Apr 2025 00:55  Patient On (Oxygen Delivery Method): room air        I&O's Summary        Physical Exam:   GENERAL: NAD, well-groomed, well-developed  HEENT: VINNY/   Atraumatic, Normocephalic  ENMT: No tonsillar erythema, exudates, or enlargement; Moist mucous membranes, Good dentition, No lesions  NECK: Supple, No JVD, Normal thyroid  CHEST/LUNG: grossly clear   CVS: Regular rate and rhythm; No murmurs, rubs, or gallops  GI: : Soft, Nontender, Nondistended; Bowel sounds present  NERVOUS SYSTEM:  Alert & Oriented X3  EXTREMITIES:  LE wounds  LYMPH: No lymphadenopathy noted  SKIN: No rashes or lesions  ENDOCRINOLOGY: No Thyromegaly  PSYCH: Appropriate    Labs:                              8.8    6.14  )-----------( 240      ( 01 Apr 2025 08:37 )             30.0                         9.4    9.37  )-----------( 285      ( 31 Mar 2025 08:49 )             31.5     04-01    133[L]  |  97  |  28[H]  ----------------------------<  74  4.3   |  23  |  2.87[H]  03-31    130[L]  |  96  |  32[H]  ----------------------------<  93  4.6   |  22  |  3.63[H]    Ca    7.2[L]      01 Apr 2025 08:37      CAPILLARY BLOOD GLUCOSE              Urinalysis Basic - ( 01 Apr 2025 08:37 )    Color: x / Appearance: x / SG: x / pH: x  Gluc: 74 mg/dL / Ketone: x  / Bili: x / Urobili: x   Blood: x / Protein: x / Nitrite: x   Leuk Esterase: x / RBC: x / WBC x   Sq Epi: x / Non Sq Epi: x / Bacteria: x          Studies  < from: CT Abdomen and Pelvis w/ IV Cont (03.25.25 @ 13:43) >    ACC: [id number] EXAM:  CT CHEST IC   ORDERED BY:  NELL TOLBERT     ACC: [id number] EXAM:  CT ABDOMEN AND PELVIS IC   ORDERED BY:  NELL TOLBERT     PROCEDURE DATE:  03/25/2025          INTERPRETATION:  CLINICAL INFORMATION: Evaluate for pneumonia. Abdominal   pain.    COMPARISON: CT chest 2/23/2025. CT abdomen and pelvis 2/20/2025.    CONTRAST/COMPLICATIONS:  IV Contrast: Omnipaque 350  90 cc administered   10 cc discarded  Oral Contrast: NONE    PROCEDURE:  CT of the Chest, Abdomen and Pelvis was performed.  Sagittal and coronal reformats were performed.    FINDINGS:  CHEST:  LUNGS AND LARGE AIRWAYS: Patent central airways. Small amount of debris   within the trachea. Right lower lobe consolidative opacities. Additional   consolidative opacities in the left lower lobe, possibly atelectasis. A   few ill-defined nodular opacities in the left lower lobe measuring up to   6 mm.  PLEURA: Small left pleural effusion.  VESSELS: Right IJ approach catheter with tip in the superior cavoatrial   junction. Linear nonocclusive filling defect within the right internal   jugular vein (3:1). Atherosclerotic changes. Coronary artery   calcification.  HEART: Heart size is normal. Aortic valve and mitral annular   calcification. No pericardialeffusion.  MEDIASTINUM AND MARYANNE: No lymphadenopathy.  CHEST WALL AND LOWER NECK: Within normal limits.    ABDOMEN AND PELVIS:  LIVER: Subcentimeter hypodensity in the left lobe that is too small to   characterize.  BILE DUCTS: Normal caliber.  GALLBLADDER: Cholelithiasis.  SPLEEN: Within normal limits.  PANCREAS: Within normal limits.  ADRENALS: Within normal limits.  KIDNEYS/URETERS: Bilateral mild hydroureteronephrosis to the level of the   urinary bladder without evidence of obstructive urolithiasis, not   significantly changed from the prior exam of 2/20/2025. Diffuse   urothelial thickening of the bilateral ureters and collecting systems,   also similar to the previous CT. An exophytic hypodense right renal   lesion measuring 4.1 cm is unchanged.    BLADDER: Underdistended with a suprapubic catheter in place.  REPRODUCTIVE ORGANS: Enlarged prostate. Linear densities in the prostate,   possibly from previous UroLift procedure; correlate with the patient's   surgical history.    BOWEL:Rectal wall thickening. Sigmoid anastomosis. No evidence for bowel   obstruction. Normal appendix.  PERITONEUM/RETROPERITONEUM: Within normal limits.  VESSELS: Atherosclerotic changes. Bilateral common iliac/external iliac   artery stents. Previouslydescribed bilateral external iliac and   bilateral superficial femoral artery occlusions seen on the prior CTA is   not well assessed by this exam.  LYMPH NODES: Small left para-aortic lymph node measuring 1.1 x 0.9 cm,   unchanged.  ABDOMINAL WALL: Suprapubic catheter. Subcutaneous gas and subcutaneous   infiltration tracking through the right medial buttock. No discrete   associated collection within the field-of-view..  BONES: Degenerative changes. Bilateral femoral fixation hardware.    IMPRESSION:    CHEST:  *  Right lower lobe consolidative opacities, which may represent   pneumonia. Additional consolidative opacities in the left lower lobe,   potentially atelectasis or infection. A few ill-defined nodular opacities   in the left lower lobe measuring up to 6 mm may also be   infectious/inflammatory in etiology. Suggest continued attention on   follow-up imaging.  *  Small amount of debris in the trachea.  *  Small left pleural effusion.  *  Linear nonocclusive filling defect within the right internal jugular   vein, which may represent residual fibrin sheath or nonocclusive thrombus.    ABDOMEN AND PELVIS:  *  Subcutaneous gas and infiltration tracking along the medial right   buttock, potentially related to a decubitus wound with gas tracking from   the external environment. A necrotizing airforming infection is also a   possibility. Correlate with physical exam.  *  Rectal wall thickening, suggestive of proctitis.  *  Bilateral mild hydroureteronephrosis to the level of the urinary   bladder without evidence of obstructive urolithiasis, and not   significantly changed since 2/20/2025.  *  Diffuse urothelial thickening of the bilateral renal collecting   systems and ureters, which may be seen with an ascending urinary tract   infection or inflammation, similar to the previous CT 2/20/2025.    Findings were discussed with Dr. NELL TOLBERT 3/25/2025 4:30 PM by   Dr. Calzada.    --- End of Report ---            < end of copied text >

## 2025-04-02 NOTE — PROGRESS NOTE ADULT - SUBJECTIVE AND OBJECTIVE BOX
[facility]  SABINO Griffin Y. Patel, S. Shah, G. Casimir  [phone]  ([phone] - weekdays after 5pm and weekends)    Name: BRIAN DEMPSEY  Age/Gender: 75y Male  MRN: [id number]    Interval History:  Patient seen and examined this morning.   No new complaints noted.  Notes reviewed  No concerning overnight events  Afebrile   Allergies: No Known Allergies      Objective:  Vitals:   T(F): 97.7 (04-02-25 @ 00:55), Max: 98.3 (04-01-25 @ 15:41)  HR: 91 (04-02-25 @ 08:46) (88 - 91)  BP: 122/58 (04-02-25 @ 08:46) (112/61 - 166/81)  RR: 18 (04-02-25 @ 00:55) (17 - 18)  SpO2: 97% (04-02-25 @ 00:55) (95% - 97%)  Physical Examination:  General: no acute distress, nontoxic appearing   HEENT: normocephalic, atraumatic, anicteric  Respiratory: no acc muscle use, breathing comfortably  Cardiovascular: S1 and S2 present  Gastrointestinal: nondistended, SPC  Extremities: LE ischemic changes, dec LE edema    Laboratory Studies:  CBC:                       8.8    6.14  )-----------( 240      ( 01 Apr 2025 08:37 )             30.0     WBC Trend:  6.14 04-01-25 @ 08:37  9.37 03-31-25 @ 08:49  8.20 03-28-25 @ 10:23    CMP: 04-01    133[L]  |  97  |  28[H]  ----------------------------<  74  4.3   |  23  |  2.87[H]    Ca    7.2[L]      01 Apr 2025 08:37    Creatinine: 2.87 mg/dL (04-01-25 @ 08:37)  Creatinine: 3.63 mg/dL (03-31-25 @ 08:49)  Creatinine: 2.52 mg/dL (03-29-25 @ 10:16)  Creatinine: 3.21 mg/dL (03-28-25 @ 10:24)    Microbiology: reviewed   03-31-25 @ 07:28 SARS-CoV-2 NotDetec/Influenza A NotDetec/Influenza B NotDetec/RSV NotDetec    Radiology: reviewed     Medications:  acetaminophen     Tablet .. 650 milliGRAM(s) Oral every 6 hours PRN  albuterol/ipratropium for Nebulization 3 milliLiter(s) Nebulizer every 6 hours  aspirin  chewable 81 milliGRAM(s) Oral daily  atorvastatin 40 milliGRAM(s) Oral at bedtime  benzocaine/menthol Lozenge 1 Lozenge Oral three times a day PRN  bisacodyl 5 milliGRAM(s) Oral every 12 hours PRN  chlorhexidine 2% Cloths 1 Application(s) Topical daily  clopidogrel Tablet 75 milliGRAM(s) Oral daily  Dakins Solution - 1/4 Strength 1 Application(s) Topical every 8 hours  epoetin aleah-epbx (RETACRIT) Injectable 52865 Unit(s) IV Push <User Schedule>  heparin   Injectable 5000 Unit(s) SubCutaneous every 8 hours  HYDROmorphone  Injectable 2 milliGRAM(s) IV Push every 4 hours PRN  hydrOXYzine hydrochloride 25 milliGRAM(s) Oral three times a day  ibuprofen  Tablet. 400 milliGRAM(s) Oral every 12 hours  lactobacillus acidophilus 1 Tablet(s) Oral two times a day with meals  meropenem  IVPB 500 milliGRAM(s) IV Intermittent every 12 hours  mupirocin 2% Ointment 1 Application(s) Topical <User Schedule>  Nephro-margaret 1 Tablet(s) Oral daily  oxyCODONE    IR 10 milliGRAM(s) Oral every 4 hours PRN  oxyCODONE  ER Tablet 30 milliGRAM(s) Oral every 12 hours  pantoprazole    Tablet 40 milliGRAM(s) Oral before breakfast  polyethylene glycol 3350 17 Gram(s) Oral daily  povidone iodine 10% Solution 1 Application(s) Topical two times a day  senna 2 Tablet(s) Oral at bedtime  sodium chloride 0.9% lock flush 10 milliLiter(s) IV Push every 1 hour PRN    Current Antimicrobials:  meropenem  IVPB 500 milliGRAM(s) IV Intermittent every 12 hours    Prior/Completed Antimicrobials:  piperacillin/tazobactam IVPB.  piperacillin/tazobactam IVPB.  piperacillin/tazobactam IVPB.-  piperacillin/tazobactam IVPB.-  trimethoprim   80 mG/sulfamethoxazole 400 mG  trimethoprim  160 mG/sulfamethoxazole 800 mG  vancomycin  IVPB  vancomycin  IVPB

## 2025-04-02 NOTE — PROGRESS NOTE ADULT - ASSESSMENT
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 He is well known to me from multiple recent admisions  s/p admissions at [facility] 12/20-12/25/24 and 2/4-2/7 with SONDRA on CKD with associated uremic symptoms.   At the last admission he had expressed strong interest to try to hold off with HD intiation but he has been getting worse clinically at home.  His SONDRA and uremic symptoms significantly improved after the first admission; they improved a to mild extent during the 2nd admission to the point where he was able to be discharged without HD. Since then, however, he has worsened further.   He has been suffering from progressively worsening diffuse pruritus, loss of appetite, and generalized weakness and falls.   His nephrologist and PCP has been speaking with him and his family over the past few days,   The initial plan was that he would present to the [facility] ER Monday 2/17 (ie tomorrow) for HD initiation. Today, however, he fell and sustained trauma to his knee. Given the fall and concern for knee fracture, he presented to the ER today. multiple xrays show no Fractures.      CKD5, uremia, requiring initiation of HD  Rash, seborrheic dermatitis  Pruritis  Anemia  PAD  SP catheter, chronic  Left inferior pole acute on chronic patella Fx    plan  - HD via R subclavian permacath  -4/2: seen prior to going to HD, no new events. tomorrow completing 10 day course of Meropenem. ongoing need for narcotics 2/2 pain.   -4/1: ptn is frustrated about a prolonged hospitalization but states he feels better overall and once medically cleared he will be ready to go home, not sooner than 4/7. completing ABx on 4/3.   -3/31: at HD today had 1.7 L UF, post HD was hypotense, gave gentle IVF , BP still a bit low. afebrile, loose BMs but not diarrhea, GI PCR not sent, RVP neg. . on Meropenem course for total of 10 days, last day 4/3, would care for decubiti and left knee wound post trauma  -3/30: Tmax 100.9, c/o dry cough and diarrhea but not watery. pain is controlled at the time of visit. wife at bedside. will check RVP panel, GI PCR, sine diarrhea non watery will not order C. Diff. ID to follow  - 3/29: cont meropenem, seen by coverage  - 3/28: pain from decubitus ulcer and Left knee post debridement continues. wound care recs in place. on Meropenem for PNA, pulm and ID following. HD as per renal. HDS  -3/27: ptn is complaining that pain post decubital and left knee debridement has been severe. his daughter is on speaker phone, wife is at bedside. his pain is controlled when meds are administered. he is very uncomfortable due to the pain at the site of decubitus debridement. lyrica helped his pain before but made him drowsy, griffin with gabapentin. he doesnt want them to be resumed. will cont dilaudid, oxycodone, oxycontin, ibuprofen. he is on Meropenem for aspiration PNA/HCAP. [facility] for DVT ppx. seen by vascular cardiology to address R IJ post shiley non occlusive DVT. full AC was not recommended. will check rpt doppler in 3-4 weeks.   - 3/26: Wound care performed bedside debridement of Left knee wound 2/2 lifted eschar, and sacral decubitus. findings c/w possible infection and mainly fat necrosis. cont Meropenem. ID following. podiatry following for gangrenous toes. will give additional single dose IV DIlaudid 2/2 severe pain post debridement.    - 3/25: wife at bedside, daughter on speaker phone at bedside. ptn is pain free at the time of visit and states he wants to cont the pain regiment he is presently on. ct C/A/P revealed: RLL PNA, prob aspiration, stercoral proctitis, decubitus ulcer w possible progression to sacral OM, R IJ nonocclusive DVT. these findings d/w ptn ,his family, wound care, ACP, Vascular, Nephrology, ID, pUlm, GI. meropenem initiated, Bactrim stopped. ptn states when he is cleared for DC, he wants to go home , not to [facility], not to SNF. daughter and wife aware.   -3/24: ptn  was seen by GI for N/V, its not clear the etiology, will obtain Ct C/A/P. ptn states he is coughing up green mucus as well  -3/23:  ptn is in good spirits, says he vomited "spit" this am, but tolerated all meals without vomiting. no abd pain, no undigested food in the vomit. afebrile, no diarrhea, RVP neg. GI consulted.   -3/21-22: ptn feels well.  pain is controlled. still no accepting facility for [facility]  -3/20: ptn states he has severe low back and LLE pain though overall is improving.   -3/19: ptn w tan crusting around SPC , states its painful. started on po Bactrim, renally dosed by ID for cellulitis.   -3/18: LLE paraesthesias are chronic, will d/w CM re dc planning to [facility]    -3/17: ptn states he is lethargic, he has LLE numbness which is new and severe pain at SPC insertion site. this was ID, d/w neuro, renal and vascular. as per ID: no sign of an acute infection recommend CT A/P.   -3/16:  urine cx from 3/15 NTD, zosyn DCed, awaiting dc to [facility], not sure will be able to go to [facility] since there is an insurance coverage conflict. HD as per renal  - 3/15: spoke w ptn's daughter today re denial from [place] for [facility]. ptn has [facility] medicare advantage plan, which [facility] doesnt accept. i recommended to speak  to Cm and to the insurance company to find participating facilities. ptn is stable today. pain and erythema at SP catheter site has resolved today. seen by ID, will cont ZOSYN for now. UCx sent.   - 3/14: suprapubic tube changhed by , ptn has crusting at the insertion site and pain. will start Abx, urine always has sediment, will sent for cx, start Vanco/ZOsyn. ID consult called. check MRSA/MSSA PCR  -3/13: ptn is doing well. ptn has an accepting rehab facility, now pending AUTH.   -3/12: ptn is no longer constipated. doing well off prn IV DIlaudid. awaiting dc to [facility]  -3/11: ptn has no new c/o other than feeling constipated, lactulose ordered. also in preparation for NYDIA will dc prn IV DIlaudid, cont prn OXy IR  -3/10:  ptn is awake, alert, wife at bedside, i instructed them to give rehab choices. also awaiting SPC change to be done by urology. pain is controlled  -3/9: seen by PT, will refer to NYDIA. choices to be given in AM. this was d/w ptn, daughter, wife.   -3/8:  ptn didnt want to get PT eval done, will reorder. ptn needs NYDIA placement. PMNR consult ordered  - 3/7: ptn is alert , pain is controlled, DC planning d/w ptn and HCP, daughter Yanique  -3/6:  ptn is awake, alert, ecchymotic feet look improved, pain is controlled. DC planning to [facility]  3/4-5 - s/p b/l iliac arteries angioplasty and stent placements on 3/3. today has new ecchymoses in b/l LE, concern for arterial insufficiency. vascular aware.. wound from Left knee immobilizer is dressed and healing. pain is controlled.   LEFT UE AVF placement/scheduling will be on outptn basis as per vascular. dc planning to [facility]  - 3/3: ptn is s/p angiogram RLE : b/l iliac arteries w successful angioplasty and stents. in PACU. awaitng HD.  ptn has a pressure wound from the LLE immobilizer posteriorly proximal to the knee. its been on 2/2 knee fracture, applied by ortho and managed by ptn's wife as per ptn's and wife's request , this was d/w nursing and nurse manager ms Ruiz.. immobilizer should be managed by orthopedics and nursing. will keep it off, only keep on when repositioning for care and then remove. wound care to F/U . this was discussed at length w daughter Yanique and wife Dee Dee.   -3/1-3/2: ptn is smiling, pain free, had HD 2/28 and 3/1, awaiting RLE angiogram 3/3, on Heparin drip, euvolemic  -2/28: ptn is lethargic, awaiting RLE angiogram possible fem-fem bypass hopefully on 3/3 pending OR availability, awaiting HD today    -2/27:  plan for angiogram in am with possible angioplasty, possible stent, possible fem-fem bypass. medically cleared for angiogram and possible surgery, stent, angioplasty. pain is controlled. remains on heparin drip as per vascular. HD as per renal    -2/26:  ptn is sleeping, pain is controlled, he was seen by wound care and vascular attending. planning on angiogram 2/2 severe PAD in LE on CTA. he also spoke to HCP. ptn and HCP in agreement. ptn was started on HEPARIN drip as per vascular recs. RIJ permacath done 2/25    - 2/25: ptn states he is hallucinating, but not at present, his MS is at baseline, his pain is controlled, he still needs prn pain meds when he is moved in the bed or for testing or for HD. awaiting Permacath, AVF creation, LE bypass to be d/w Dr. Swenson and the ptn tomorrow. ptn has cardiology clearance  if he opts for. Ptn has sacral decubiti and posterior thigh and buttock decibiti and b/l heel decubiti. Z flow fabienne d/w RN, get wound care consult    -2/24: pain is controlled  if the LLE is immobile and fully extended. doesn't tolerate the knee immobilizer. has a medium condyle and acute on chronic patella fx in LEFT knee. as per vascular ptn will need iliac stent  w a fem-fem bypass, possible axillo-femoral bypass. for this surgery he would need cardiac work up . more pressing surgery is LUE AVF creation,  in IR will arrange for Permacath. for pain control: Motrin 400 mg q12H, Oxy ER 30 mg q12H, Oxy IR 10 for mod pain and dilaudid 2 mg iv for severe pain. still has pruritis though improved, will raise atarax 25 mg to tid. plan of care d/w daughter Norma Persaud , ptn and his wife. Also d/w renal, card, vascular, ACP    -2/23: Daughter Yanique is the HCP, she and the ptn filled out the paperwork. daily plan and findings d/w her and the ptn. MS is at abseline, c/o b/l LE foot pain and Left Knee pain. xrays of left knee: cannot r/o acute on chronic inferior pole patellar Fx. knee immobilizer is on, awaiting ortho consult. doubt needs any intervention awaiting Ct chest today, will add CT Left knee. ptn states itching has recurred, severe pain has recurred. will resume Atatrax ( 25 mg bid) and Oxycodone ER , but at a lower dose 15 mg q12H. cont prn analgesics. HD as per renal, awaiting Vascular consult f/u re CTA A/L &LE and recs. ptn also wants AVF surgery on this admission. Coughing has stopped    - 2/22: ptn is drowsy but arousable, calmer today, answers questions appropriately. he is pain free, he stopped coughing, he denies having pruritis: will DC:  OXY ER, Atarax, Tessalon perles.     -  2/21: ptn is tearful, a bit confused, coughing, recognizes me and family at bedside. GOC d/w daughter and wife. AMS prob delirium 2/2 acute illness +/- opiods, lyrica, atarac. will lower atarak to tid, dc lyrica, cont Oxy 2/2 ptn has severe LE pain. neuro called for eval. Head ct done yesterday w no acute findings. CTA A/P w LE run fof: severe PAD, vascular to follow up on plan fo care. plan for HD tomorrow and next week place on tiw schedule of MWF. will need tunneled catheter prior to DC and will d/w vascular scheduling of AVF. ptn wants all to be done while inptn. daughter wants to be HCP as per ptn's wishes. she was given paperwork to get it signed and witnessed and will present to nursing thereafter. details of findings and complaints and plan of care d/w daughter and wife. spent 60 min. RVP ordered. start mucinex , tessalon perles, duonebs, get CT chest to r/o PNA. pulm called    -  2/20:  ptn had RRT 2/2 AMS and tremors. no SZ, no LOC. noted to have Na 123( hyponatremia), given 500 cc NS. Gabapentin DCed. ptn had been taking gabapentin at home PTA. Pain is controlled. Pruritis resolved, tolerating HD otherwise.     - Pain management:  cont Oxycodone 10 IR q6H,  DIlaudid prn severe pain 2 mg q4H prn.   - cont outptn meds  - DVT ppx w [facility]

## 2025-04-02 NOTE — PROGRESS NOTE ADULT - ASSESSMENT
74 year old male with CKD, sp polio, paraplegia with associated neurogenic bladder s/p suprapubic catheter who was admitted s/p fall on 2/16.   CKD - ESRD, now s/p DEYA [place] 2/17, on HD  ruled out cellulitis around suprapubic cath  2/20 s/p RRT for tremors and confusion -- pt with chronic tremors although notably worse  2/20 CXR with clear lungs   SPC site with chronic/stable inflammatory changes, no drainage - no sign of SSTI  CTA abd with occlusion of b/l external iliac arteries and b/l superficial femoral arteries with distal reconstitution at popliteal arteries; patent three vessel runoff of RLE with 2 vessel runoff of LLE with occlusion of L mid anterior tibial artery with distal reconstitution  Vascular following for worsening PAD with worsening ischemic changes   -3/3 s/p RLE angiogram b/l iliac artery stents   mental status at baseline    3/16 no tenderness at suprapubic catheter site, no visible erythema at catheter site on exam  UA w/ many epithelial cells, Ucx negative  s/p zosyn 3/14-3/16  3/19 SPC site remains without erythema but now noted with some tan crusting on dressing  3/23 reporting vomiting x5 episodes, no diarrhea or other focal sx  Treated for possible SPC site infection with bactrim 3/19-3/25  3/25 CT Chest noted RLL consolidative opacities, LLL opacities - pneumonia vs atelectasis, few ill defined nodular opacities in LLL infectious vs inflammatory  3/25 CTAP with subcutaneous gas and infiltration tracking along medial R buttock, possibly related to decubitus wound with gas tracking from the external environment, possible necrotizing air forming infection; rectal wall thickening suggestive of proctitis; b/l mild hydroureteronephrosis to level of bladder with no obstruction and diffuse urothelial thickening of b/l renal collecting systems and ureters similar to 2/20  MRSA screen has been negative   pt with no resp symptoms- no cough, dyspnea or pain, may have aspirated with vomiting recently, saturating well on RA  3/26 s/p bedside debridement of L knee wound  as dry eschar  from wound edges without drainage and bedside excisional debridement of unstageable sacrum to left buttock into perineum evolving unstageable pressure injury through necrotic dermis into nonviable subcutaneous tissues, debulking debridement of necrotic tissue done by Wound care; other wounds noted including L calf to ankle wound with liquefaction necrotic drainage; R ischium wound with moist pink granular tissue and L buttock fading DTI  3/30 overnight febrile to 100.9F ?inflammatory -- now afebrile x24h, WBC wnl, remains on meropenem   dry cough noted, COVID/Flu/RSV negative;   loose stools per pt after bowel regimen--no diarrhea, some nausea with no vomiting   4/2 remains afebrile, no leukocytosis, overall feels better     Recommendations:   Continue meropenem (renally dosed) based on prior cultures  -given location of wounds, patient at risk of contamination, further skin/wound breakdown due to pressure 2/2 being bedbound and prolonged course of antibiotics unlikely to resolve this and will increase risk of C.diff, development of resistant organisms making treatment of infection more difficult in the future in addition to antibiotic adverse effects, therefore, recommend short course 10d course until 4/3/25   With continued local wound care, nutrition, offloading as able  GI following   HD per renal    Monitor temps/WBC  SPC site dressing change and care   Continue rest of care per primary team       Bridgette Ramirez M.D.  [facility]  Available on [employer] TEAMS - *PREFERRED*  [phone]  After 5pm on weekdays and all day on weekends - please call [phone]     Thank you for consulting us and involving us in the management of this patients case. In addition to reviewing history, imaging, documents, labs, microbiology, took into account antibiotic stewardship, local antibiogram and infection control strategies and potential transmission issues at time of treatment decision making process.

## 2025-04-02 NOTE — PROGRESS NOTE ADULT - SUBJECTIVE AND OBJECTIVE BOX
Patient is a 75y old  Male who presents with a chief complaint of ESRD requiring HD, uremia (02 Apr 2025 13:28)      SUBJECTIVE / OVERNIGHT EVENTS: seen prior to going to HD, no new events. tomorrow completing 10 day course of Meropenem.     MEDICATIONS  (STANDING):  albuterol/ipratropium for Nebulization 3 milliLiter(s) Nebulizer every 6 hours  aspirin  chewable 81 milliGRAM(s) Oral daily  atorvastatin 40 milliGRAM(s) Oral at bedtime  chlorhexidine 2% Cloths 1 Application(s) Topical daily  clopidogrel Tablet 75 milliGRAM(s) Oral daily  Dakins Solution - 1/4 Strength 1 Application(s) Topical every 8 hours  epoetin aleah-epbx (RETACRIT) Injectable 38721 Unit(s) IV Push <User Schedule>  heparin   Injectable 5000 Unit(s) SubCutaneous every 8 hours  hydrOXYzine hydrochloride 25 milliGRAM(s) Oral three times a day  ibuprofen  Tablet. 400 milliGRAM(s) Oral every 12 hours  lactobacillus acidophilus 1 Tablet(s) Oral two times a day with meals  meropenem  IVPB 500 milliGRAM(s) IV Intermittent every 12 hours  mupirocin 2% Ointment 1 Application(s) Topical <User Schedule>  Nephro-margaret 1 Tablet(s) Oral daily  oxyCODONE  ER Tablet 30 milliGRAM(s) Oral every 12 hours  pantoprazole    Tablet 40 milliGRAM(s) Oral before breakfast  polyethylene glycol 3350 17 Gram(s) Oral daily  povidone iodine 10% Solution 1 Application(s) Topical two times a day  senna 2 Tablet(s) Oral at bedtime    MEDICATIONS  (PRN):  acetaminophen     Tablet .. 650 milliGRAM(s) Oral every 6 hours PRN Temp greater or equal to 38C (100.4F), Mild Pain (1 - 3)  benzocaine/menthol Lozenge 1 Lozenge Oral three times a day PRN Sore Throat  bisacodyl 5 milliGRAM(s) Oral every 12 hours PRN Constipation  HYDROmorphone  Injectable 2 milliGRAM(s) IV Push every 4 hours PRN Severe Pain (7 - 10)  oxyCODONE    IR 10 milliGRAM(s) Oral every 4 hours PRN Moderate Pain (4 - 6)  sodium chloride 0.9% lock flush 10 milliLiter(s) IV Push every 1 hour PRN Pre/post blood products, medications, blood draw, and to maintain line patency      Vital Signs Last 24 Hrs  T(F): 97 (04-02-25 @ 13:11), Max: 98.3 (04-01-25 @ 15:41)  HR: 91 (04-02-25 @ 13:11) (88 - 91)  BP: 124/62 (04-02-25 @ 13:11) (112/61 - 166/81)  RR: 17 (04-02-25 @ 13:11) (17 - 18)  SpO2: 93% (04-02-25 @ 13:11) (93% - 97%)  Telemetry:   CAPILLARY BLOOD GLUCOSE        I&O's Summary    02 Apr 2025 07:01  -  02 Apr 2025 14:32  --------------------------------------------------------  IN: 200 mL / OUT: 0 mL / NET: 200 mL        PHYSICAL EXAM:  GENERAL: NAD, well-developed  HEAD:  Atraumatic, Normocephalic  EYES: EOMI, PERRLA, conjunctiva and sclera clear  NECK: Supple, No JVD  CHEST/LUNG: Clear to auscultation bilaterally; No wheeze  HEART: Regular rate and rhythm; No murmurs, rubs, or gallops  ABDOMEN: Soft, Nontender, Nondistended; Bowel sounds present  EXTREMITIES:  2+ Peripheral Pulses, No clubbing, cyanosis, or edema  PSYCH: AAOx3  NEUROLOGY: non-focal  SKIN: No rashes or lesions    LABS:                        8.8    6.14  )-----------( 240      ( 01 Apr 2025 08:37 )             30.0     04-01    133[L]  |  97  |  28[H]  ----------------------------<  74  4.3   |  23  |  2.87[H]    Ca    7.2[L]      01 Apr 2025 08:37            Urinalysis Basic - ( 01 Apr 2025 08:37 )    Color: x / Appearance: x / SG: x / pH: x  Gluc: 74 mg/dL / Ketone: x  / Bili: x / Urobili: x   Blood: x / Protein: x / Nitrite: x   Leuk Esterase: x / RBC: x / WBC x   Sq Epi: x / Non Sq Epi: x / Bacteria: x        RADIOLOGY & ADDITIONAL TESTS:    Imaging Personally Reviewed:    Consultant(s) Notes Reviewed:      Care Discussed with Consultants/Other Providers:

## 2025-04-02 NOTE — PROGRESS NOTE ADULT - SUBJECTIVE AND OBJECTIVE BOX
NEPHROLOGY     Patient seen and examined on HD, reports of mild pain from being moved earlier, denies sob, in no acute distress.     MEDICATIONS  (STANDING):  albuterol/ipratropium for Nebulization 3 milliLiter(s) Nebulizer every 6 hours  aspirin  chewable 81 milliGRAM(s) Oral daily  atorvastatin 40 milliGRAM(s) Oral at bedtime  chlorhexidine 2% Cloths 1 Application(s) Topical daily  clopidogrel Tablet 75 milliGRAM(s) Oral daily  Dakins Solution - 1/4 Strength 1 Application(s) Topical every 8 hours  epoetin aleah-epbx (RETACRIT) Injectable 91327 Unit(s) IV Push <User Schedule>  heparin   Injectable 5000 Unit(s) SubCutaneous every 8 hours  hydrOXYzine hydrochloride 25 milliGRAM(s) Oral three times a day  ibuprofen  Tablet. 400 milliGRAM(s) Oral every 12 hours  lactobacillus acidophilus 1 Tablet(s) Oral two times a day with meals  meropenem  IVPB 500 milliGRAM(s) IV Intermittent every 12 hours  mupirocin 2% Ointment 1 Application(s) Topical <User Schedule>  Nephro-margaret 1 Tablet(s) Oral daily  oxyCODONE  ER Tablet 30 milliGRAM(s) Oral every 12 hours  pantoprazole    Tablet 40 milliGRAM(s) Oral before breakfast  polyethylene glycol 3350 17 Gram(s) Oral daily  povidone iodine 10% Solution 1 Application(s) Topical two times a day  senna 2 Tablet(s) Oral at bedtime    VITALS:  T(C): , Max: 36.8 (04-01-25 @ 15:41)  T(F): , Max: 98.3 (04-01-25 @ 15:41)  HR: 91 (04-02-25 @ 13:11)  BP: 124/62 (04-02-25 @ 13:11)  RR: 17 (04-02-25 @ 13:11)  SpO2: 93% (04-02-25 @ 13:11)    I and O's:    04-02 @ 07:01  -  04-02 @ 13:29  --------------------------------------------------------  IN: 200 mL / OUT: 0 mL / NET: 200 mL      PHYSICAL EXAM:  Constitutional: alert, NAD  HEENT: NCAT, DMM  Neck: Supple, No JVD  Respiratory: CTA-b/l  Cardiovascular: RRR s1s2, no m/r/g  Gastrointestinal: BS+, soft, NT/ND  : (+)suprapubic cath  Extremities: 1+ b/l LE edema  Neurological: reduced generalized strength  Back: no CVAT b/l  Skin: (+)cyanotic changes b/l feet  Access: RIJ tunneled cath (accessed)     LABS:                        8.8    6.14  )-----------( 240      ( 01 Apr 2025 08:37 )             30.0     04-01    133[L]  |  97  |  28[H]  ----------------------------<  74  4.3   |  23  |  2.87[H]    Ca    7.2[L]      01 Apr 2025 08:37

## 2025-04-02 NOTE — PROGRESS NOTE ADULT - ASSESSMENT
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 The initial plan was that he would present to the [facility] ER Monday 2/17 for HD initiation. However, day of admission, he fell and sustained trauma to his knee. Called to consult for cough, elevated R hemidiaphragm.

## 2025-04-02 NOTE — CHART NOTE - NSCHARTNOTEFT_GEN_A_CORE
NUTRITION FOLLOW UP NOTE    PATIENT SEEN FOR: malnutrition follow up     SOURCE: [x] Patient visited on HD unit  [x] Current Medical Record  [] RN  [] Family/support person at bedside  [] Patient unavailable/inappropriate  [] Other:    CHART REVIEWED/EVENTS NOTED.  [] No changes to nutrition care plan to note  [x] Nutrition Status:  - chief complaint of ESRD requiring HD, uremia per chart   - PNA   - proctitis due to constipation - GI following     DIET ORDER:   Diet, Regular:   1000mL Fluid Restriction ([id number])  Low Sodium  Liquid Protein Supplement     Qty per Day:  2  Supplement Feeding Modality:  Oral  Nepro Cans or Servings Per Day:  2       Frequency:  Daily (25)      CURRENT DIET ORDER IS:  [] Appropriate:  [] Inadequate:  [x] Other: see recommendations below     NUTRITION INTAKE/PROVISION:  [x] PO: Pt reported "weak" appetite, denied issues chewing/swallowing. 0-100% PO intake per flowsheets. Pt endorsed drinking oral nutrition supplements.  [] Enteral Nutrition:  [] Parenteral Nutrition:    ANTHROPOMETRICS:  Drug Dosing Weight  Height (cm): 172.7 (03 Mar 2025 10:32)  Weight (kg): 90.7 (03 Mar 2025 10:32)  BMI (kg/m2): 30.4 (03 Mar 2025 10:32)  BSA (m2): 2.04 (03 Mar 2025 10:32)  Weights:   Daily Weight in k (-), Weight in k.3 (-), Weight in k.8 (-), Weight in k.3 (-)   weight variance noted. Weight variance possibly due to fluid shifts with edema present per flowsheets and ESRD on HD. RD will continue to monitor weight trends as able/available.     MEDICATIONS:  MEDICATIONS  (STANDING):  albuterol/ipratropium for Nebulization 3 milliLiter(s) Nebulizer every 6 hours  aspirin  chewable 81 milliGRAM(s) Oral daily  atorvastatin 40 milliGRAM(s) Oral at bedtime  chlorhexidine 2% Cloths 1 Application(s) Topical daily  clopidogrel Tablet 75 milliGRAM(s) Oral daily  Dakins Solution - 1/4 Strength 1 Application(s) Topical every 8 hours  epoetin aleah-epbx (RETACRIT) Injectable 14685 Unit(s) IV Push <User Schedule>  heparin   Injectable 5000 Unit(s) SubCutaneous every 8 hours  hydrOXYzine hydrochloride 25 milliGRAM(s) Oral three times a day  ibuprofen  Tablet. 400 milliGRAM(s) Oral every 12 hours  lactobacillus acidophilus 1 Tablet(s) Oral two times a day with meals  meropenem  IVPB 500 milliGRAM(s) IV Intermittent every 12 hours  mupirocin 2% Ointment 1 Application(s) Topical <User Schedule>  Nephro-margaret 1 Tablet(s) Oral daily  oxyCODONE  ER Tablet 30 milliGRAM(s) Oral every 12 hours  pantoprazole    Tablet 40 milliGRAM(s) Oral before breakfast  polyethylene glycol 3350 17 Gram(s) Oral daily  povidone iodine 10% Solution 1 Application(s) Topical two times a day  senna 2 Tablet(s) Oral at bedtime    MEDICATIONS  (PRN):  acetaminophen     Tablet .. 650 milliGRAM(s) Oral every 6 hours PRN Temp greater or equal to 38C (100.4F), Mild Pain (1 - 3)  benzocaine/menthol Lozenge 1 Lozenge Oral three times a day PRN Sore Throat  bisacodyl 5 milliGRAM(s) Oral every 12 hours PRN Constipation  HYDROmorphone  Injectable 2 milliGRAM(s) IV Push every 4 hours PRN Severe Pain (7 - 10)  oxyCODONE    IR 10 milliGRAM(s) Oral every 4 hours PRN Moderate Pain (4 - 6)  sodium chloride 0.9% lock flush 10 milliLiter(s) IV Push every 1 hour PRN Pre/post blood products, medications, blood draw, and to maintain line patency      NUTRITIONALLY PERTINENT LABS:   Na133 mmol/L[L] Glu 74 mg/dL K+ 4.3 mmol/L Cr  2.87 mg/dL[H] BUN 28 mg/dL[H]    A1C with Estimated Average Glucose Result: 5.7 % (24 @ 06:44)  A1C with Estimated Average Glucose Result: 4.9 % (10-09-24 @ 13:30)      NUTRITIONALLY PERTINENT MEDICATIONS/LABS:  [x] Reviewed  [x] Relevant notes on medications/labs:  - antibiotics, probiotics ordered   - nephrovite ordered   - low serum sodium    EDEMA:  [x] Reviewed  [x] Relevant notes: 2+ edema left/right feet per flowsheets     GI/ I&O:  [x] Reviewed  [] Relevant notes:  [x] Other: Pt reported occasional nausea with HD, no vomiting reported. Reported diarrhea. bowel movement recorded 3/31 per flowsheets     SKIN:   [] No pressure injuries documented, per nursing flowsheet  [x] Pressure injury previously noted  [] Change in pressure injury documentation:  [x] Other:  - per wound care note 3/31: "Wound Consult requested to assist w/ management of:  Sacral, Rt Ischial, and Left Buttock wounds  BLE severe PAD w/ extensive wounds"  - per flowsheets: unstageable pressure injuries to right buttocks, sacrum, left knee, left hip ; multiple pressure injury sites without location per flowsheets at this time (right/left heels, right posterior lower leg, left inferior knee, left posterior thigh).     ESTIMATED NEEDS:  [x] No change:  [] Updated:  Energy: 3177-0485 kcal/day (30-35 kcal/kg)  Protein: 83.8-97.7 g/day (1.2-1.4 g/kg)  Fluid:   ml/day or [x] defer to team  Based on: IBW 69.8 kg    NUTRITION DIAGNOSIS:  [x] Prior Dx: 1) moderate chronic malnutrition and 2) increased nutrient needs  [] New Dx:    EDUCATION:  [x] Yes: encouraged continued consumption of oral nutrition supplements. Encouraged to provide additional food preferences, declined at time of visit. Pt was made aware RD remains available and he expressed understanding.   [] Not appropriate/warranted    NUTRITION CARE PLAN:  1. Diet: Continue Low Sodium therapeutic diet restriction; additional dietary restrictions not recommended to promote PO intake at this time. Defer fluid restriction to medical team discretion   2. Supplements: Continue Nepro oral supplement BID to provide 420 kcal, 19 grams protein per 8 oz. Continue LPS BID to provide 100 kcal, 15 grams protein per serving,  - RD will continue to provide protein fortified smoothie as available to promote PO intake   3. Multivitamin/mineral supplementation: Continue Nephrovite to promote wound healing, pending no medical contraindications   4. Monitor PO intake, PO diet tolerance, skin, weight, nutrition related labs, GI function, goals of care     [] Achieved - Continue current nutrition intervention(s)  [] Current medical condition precludes nutrition intervention at this time.    MONITORING AND EVALUATION:   RD remains available upon request and will follow up per protocol.    Merlene Padilla MS, RDN, CDN; available on Teams

## 2025-04-02 NOTE — PROGRESS NOTE ADULT - ASSESSMENT
74 year old man with SONDRA on CKD requiring HD, now with nausea    1. ESRD on HD (new)     2. Nausea - improved  -favor r/t newly started HD   -PPI daily for GERD   -tolerating diet    3. Proctitis d/t constipation which has since resolved   -maintain on senna qhs with Miralax twice daily   -enema as needed     4. Decubitus Ulcer   CT noted, f/u primary, ID and wound care teams

## 2025-04-03 NOTE — PROGRESS NOTE ADULT - SUBJECTIVE AND OBJECTIVE BOX
INTERVAL HPI/OVERNIGHT EVENTS:    some nausea this morning when seen, no vomiting     MEDICATIONS  (STANDING):  albuterol/ipratropium for Nebulization 3 milliLiter(s) Nebulizer every 6 hours  aspirin  chewable 81 milliGRAM(s) Oral daily  atorvastatin 40 milliGRAM(s) Oral at bedtime  chlorhexidine 2% Cloths 1 Application(s) Topical daily  clopidogrel Tablet 75 milliGRAM(s) Oral daily  Dakins Solution - 1/4 Strength 1 Application(s) Topical every 8 hours  epoetin aleah-epbx (RETACRIT) Injectable 02242 Unit(s) IV Push <User Schedule>  heparin   Injectable 5000 Unit(s) SubCutaneous every 8 hours  hydrOXYzine hydrochloride 25 milliGRAM(s) Oral three times a day  ibuprofen  Tablet. 400 milliGRAM(s) Oral every 12 hours  lactobacillus acidophilus 1 Tablet(s) Oral two times a day with meals  meropenem  IVPB 500 milliGRAM(s) IV Intermittent every 12 hours  mupirocin 2% Ointment 1 Application(s) Topical <User Schedule>  Nephro-amrgaret 1 Tablet(s) Oral daily  oxyCODONE  ER Tablet 30 milliGRAM(s) Oral every 12 hours  pantoprazole    Tablet 40 milliGRAM(s) Oral before breakfast  polyethylene glycol 3350 17 Gram(s) Oral daily  povidone iodine 10% Solution 1 Application(s) Topical two times a day  senna 2 Tablet(s) Oral at bedtime    MEDICATIONS  (PRN):  acetaminophen     Tablet .. 650 milliGRAM(s) Oral every 6 hours PRN Temp greater or equal to 38C (100.4F), Mild Pain (1 - 3)  benzocaine/menthol Lozenge 1 Lozenge Oral three times a day PRN Sore Throat  bisacodyl 5 milliGRAM(s) Oral every 12 hours PRN Constipation  HYDROmorphone   Tablet 4 milliGRAM(s) Oral every 4 hours PRN Severe Pain (7 - 10)  oxyCODONE    IR 10 milliGRAM(s) Oral every 4 hours PRN Moderate Pain (4 - 6)  sodium chloride 0.9% lock flush 10 milliLiter(s) IV Push every 1 hour PRN Pre/post blood products, medications, blood draw, and to maintain line patency      Allergies    No Known Allergies    Intolerances        Review of Systems:    General:  No wt loss, fevers, chills, night sweats, fatigue   Eyes:  Good vision, no reported pain  ENT:  No sore throat, pain, runny nose, dysphagia  CV:  No pain, palpitations, hypo/hypertension  Resp:  No dyspnea, cough, tachypnea, wheezing  GI:  No pain, No nausea, No vomiting, No diarrhea, No constipation, No weight loss, No fever, No pruritis, No rectal bleeding, No melena, No dysphagia  :  No pain, bleeding, incontinence, nocturia  Muscle:  No pain, weakness  Neuro:  No weakness, tingling, memory problems  Psych:  No fatigue, insomnia, mood problems, depression  Endocrine:  No polyuria, polydypsia, cold/heat intolerance  Heme:  No petechiae, ecchymosis, easy bruisability  Skin:  No rash, tattoos, scars, edema      Vital Signs Last 24 Hrs  T(C): 36.4 (03 Apr 2025 07:52), Max: 37.2 (03 Apr 2025 00:07)  T(F): 97.6 (03 Apr 2025 07:52), Max: 98.9 (03 Apr 2025 00:07)  HR: 94 (03 Apr 2025 07:52) (94 - 113)  BP: 106/73 (03 Apr 2025 07:52) (106/73 - 145/66)  BP(mean): --  RR: 18 (03 Apr 2025 07:52) (18 - 20)  SpO2: 97% (03 Apr 2025 07:52) (94% - 97%)    Parameters below as of 03 Apr 2025 07:52  Patient On (Oxygen Delivery Method): room air        PHYSICAL EXAM:    Constitutional: NAD  HEENT: EOMI, throat clear  Neck: No LAD, supple  Respiratory: CTA and P  Cardiovascular: S1 and S2, RRR, no M  Gastrointestinal: BS+, soft, NT/ND, neg HSM,  Extremities: No peripheral edema, neg clubbing, cyanosis  Vascular: 2+ peripheral pulses  Neurological: A/O   Psychiatric: Normal mood, normal affect  Skin: No rashes      LABS:                RADIOLOGY & ADDITIONAL TESTS:

## 2025-04-03 NOTE — PROGRESS NOTE ADULT - SUBJECTIVE AND OBJECTIVE BOX
Date of Service: 04-03-25 @ 10:55    Patient is a 75y old  Male who presents with a chief complaint of ESRD requiring HD, uremia (02 Apr 2025 14:31)      Any change in ROS:   No new respiratory events overnight. Denies SOB/CP.      MEDICATIONS  (STANDING):  albuterol/ipratropium for Nebulization 3 milliLiter(s) Nebulizer every 6 hours  aspirin  chewable 81 milliGRAM(s) Oral daily  atorvastatin 40 milliGRAM(s) Oral at bedtime  chlorhexidine 2% Cloths 1 Application(s) Topical daily  clopidogrel Tablet 75 milliGRAM(s) Oral daily  Dakins Solution - 1/4 Strength 1 Application(s) Topical every 8 hours  epoetin aleah-epbx (RETACRIT) Injectable 20321 Unit(s) IV Push <User Schedule>  heparin   Injectable 5000 Unit(s) SubCutaneous every 8 hours  hydrOXYzine hydrochloride 25 milliGRAM(s) Oral three times a day  ibuprofen  Tablet. 400 milliGRAM(s) Oral every 12 hours  lactobacillus acidophilus 1 Tablet(s) Oral two times a day with meals  meropenem  IVPB 500 milliGRAM(s) IV Intermittent every 12 hours  mupirocin 2% Ointment 1 Application(s) Topical <User Schedule>  Nephro-margaret 1 Tablet(s) Oral daily  oxyCODONE  ER Tablet 30 milliGRAM(s) Oral every 12 hours  pantoprazole    Tablet 40 milliGRAM(s) Oral before breakfast  polyethylene glycol 3350 17 Gram(s) Oral daily  povidone iodine 10% Solution 1 Application(s) Topical two times a day  senna 2 Tablet(s) Oral at bedtime    MEDICATIONS  (PRN):  acetaminophen     Tablet .. 650 milliGRAM(s) Oral every 6 hours PRN Temp greater or equal to 38C (100.4F), Mild Pain (1 - 3)  benzocaine/menthol Lozenge 1 Lozenge Oral three times a day PRN Sore Throat  bisacodyl 5 milliGRAM(s) Oral every 12 hours PRN Constipation  HYDROmorphone  Injectable 2 milliGRAM(s) IV Push every 4 hours PRN Severe Pain (7 - 10)  oxyCODONE    IR 10 milliGRAM(s) Oral every 4 hours PRN Moderate Pain (4 - 6)  sodium chloride 0.9% lock flush 10 milliLiter(s) IV Push every 1 hour PRN Pre/post blood products, medications, blood draw, and to maintain line patency    Vital Signs Last 24 Hrs  T(C): 36.4 (03 Apr 2025 07:52), Max: 37.2 (03 Apr 2025 00:07)  T(F): 97.6 (03 Apr 2025 07:52), Max: 98.9 (03 Apr 2025 00:07)  HR: 94 (03 Apr 2025 07:52) (91 - 113)  BP: 106/73 (03 Apr 2025 07:52) (106/73 - 145/66)  BP(mean): --  RR: 18 (03 Apr 2025 07:52) (17 - 20)  SpO2: 97% (03 Apr 2025 07:52) (93% - 97%)    Parameters below as of 03 Apr 2025 07:52  Patient On (Oxygen Delivery Method): room air        I&O's Summary    02 Apr 2025 07:01  -  03 Apr 2025 07:00  --------------------------------------------------------  IN: 200 mL / OUT: 0 mL / NET: 200 mL          Physical Exam:   GENERAL: NAD  HEENT: VINNY/   Atraumatic, Normocephalic  ENMT: No tonsillar erythema, exudates, or enlargement  NECK: Supple, No JVD, Normal thyroid  CHEST/LUNG: Decreased at bases   CVS: Regular rate and rhythm; No murmurs, rubs, or gallops  GI: : Soft, Nontender, Nondistended; Bowel sounds present  NERVOUS SYSTEM:  Alert & Oriented X3  EXTREMITIES:  LE wounds   LYMPH: No lymphadenopathy noted  SKIN: No rashes or lesions  ENDOCRINOLOGY: No Thyromegaly  PSYCH: Appropriate    Labs:                              8.8    6.14  )-----------( 240      ( 01 Apr 2025 08:37 )             30.0                         9.4    9.37  )-----------( 285      ( 31 Mar 2025 08:49 )             31.5     04-01    133[L]  |  97  |  28[H]  ----------------------------<  74  4.3   |  23  |  2.87[H]  03-31    130[L]  |  96  |  32[H]  ----------------------------<  93  4.6   |  22  |  3.63[H]    Studies  < from: CT Abdomen and Pelvis w/ IV Cont (03.25.25 @ 13:43) >    ACC: [id number] EXAM:  CT CHEST IC   ORDERED BY:  NELL TOLBERT     ACC: [id number] EXAM:  CT ABDOMEN AND PELVIS IC   ORDERED BY:  NELL TOLBERT     PROCEDURE DATE:  03/25/2025          INTERPRETATION:  CLINICAL INFORMATION: Evaluate for pneumonia. Abdominal   pain.    COMPARISON: CT chest 2/23/2025. CT abdomen and pelvis 2/20/2025.    CONTRAST/COMPLICATIONS:  IV Contrast: Omnipaque 350  90 cc administered   10 cc discarded  Oral Contrast: NONE    PROCEDURE:  CT of the Chest, Abdomen and Pelvis was performed.  Sagittal and coronal reformats were performed.    FINDINGS:  CHEST:  LUNGS AND LARGE AIRWAYS: Patent central airways. Small amount of debris   within the trachea. Right lower lobe consolidative opacities. Additional   consolidative opacities in the left lower lobe, possibly atelectasis. A   few ill-defined nodular opacities in the left lower lobe measuring up to   6 mm.  PLEURA: Small left pleural effusion.  VESSELS: Right IJ approach catheter with tip in the superior cavoatrial   junction. Linear nonocclusive filling defect within the right internal   jugular vein (3:1). Atherosclerotic changes. Coronary artery   calcification.  HEART: Heart size is normal. Aortic valve and mitral annular   calcification. No pericardialeffusion.  MEDIASTINUM AND MARYANNE: No lymphadenopathy.  CHEST WALL AND LOWER NECK: Within normal limits.    ABDOMEN AND PELVIS:  LIVER: Subcentimeter hypodensity in the left lobe that is too small to   characterize.  BILE DUCTS: Normal caliber.  GALLBLADDER: Cholelithiasis.  SPLEEN: Within normal limits.  PANCREAS: Within normal limits.  ADRENALS: Within normal limits.  KIDNEYS/URETERS: Bilateral mild hydroureteronephrosis to the level of the   urinary bladder without evidence of obstructive urolithiasis, not   significantly changed from the prior exam of 2/20/2025. Diffuse   urothelial thickening of the bilateral ureters and collecting systems,   also similar to the previous CT. An exophytic hypodense right renal   lesion measuring 4.1 cm is unchanged.    BLADDER: Underdistended with a suprapubic catheter in place.  REPRODUCTIVE ORGANS: Enlarged prostate. Linear densities in the prostate,   possibly from previous UroLift procedure; correlate with the patient's   surgical history.    BOWEL:Rectal wall thickening. Sigmoid anastomosis. No evidence for bowel   obstruction. Normal appendix.  PERITONEUM/RETROPERITONEUM: Within normal limits.  VESSELS: Atherosclerotic changes. Bilateral common iliac/external iliac   artery stents. Previouslydescribed bilateral external iliac and   bilateral superficial femoral artery occlusions seen on the prior CTA is   not well assessed by this exam.  LYMPH NODES: Small left para-aortic lymph node measuring 1.1 x 0.9 cm,   unchanged.  ABDOMINAL WALL: Suprapubic catheter. Subcutaneous gas and subcutaneous   infiltration tracking through the right medial buttock. No discrete   associated collection within the field-of-view..  BONES: Degenerative changes. Bilateral femoral fixation hardware.    IMPRESSION:    CHEST:  *  Right lower lobe consolidative opacities, which may represent   pneumonia. Additional consolidative opacities in the left lower lobe,   potentially atelectasis or infection. A few ill-defined nodular opacities   in the left lower lobe measuring up to 6 mm may also be   infectious/inflammatory in etiology. Suggest continued attention on   follow-up imaging.  *  Small amount of debris in the trachea.  *  Small left pleural effusion.  *  Linear nonocclusive filling defect within the right internal jugular   vein, which may represent residual fibrin sheath or nonocclusive thrombus.    ABDOMEN AND PELVIS:  *  Subcutaneous gas and infiltration tracking along the medial right   buttock, potentially related to a decubitus wound with gas tracking from   the external environment. A necrotizing airforming infection is also a   possibility. Correlate with physical exam.  *  Rectal wall thickening, suggestive of proctitis.  *  Bilateral mild hydroureteronephrosis to the level of the urinary   bladder without evidence of obstructive urolithiasis, and not   significantly changed since 2/20/2025.  *  Diffuse urothelial thickening of the bilateral renal collecting   systems and ureters, which may be seen with an ascending urinary tract   infection or inflammation, similar to the previous CT 2/20/2025.    Findings were discussed with Dr. NELL TOLBERT 3/25/2025 4:30 PM by   Dr. Calzada.    --- End of Report ---        < end of copied text >

## 2025-04-03 NOTE — PROGRESS NOTE ADULT - ASSESSMENT
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 The initial plan was that he would present to the [facility] ER Monday 2/17 for HD initiation. However, day of admission, he fell and sustained trauma to his knee. Called to consult for cough, elevated R hemidiaphragm.

## 2025-04-03 NOTE — PROGRESS NOTE ADULT - ASSESSMENT
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 He is well known to me from multiple recent admisions  s/p admissions at [facility] 12/20-12/25/24 and 2/4-2/7 with SONDRA on CKD with associated uremic symptoms.   At the last admission he had expressed strong interest to try to hold off with HD intiation but he has been getting worse clinically at home.  His SONDRA and uremic symptoms significantly improved after the first admission; they improved a to mild extent during the 2nd admission to the point where he was able to be discharged without HD. Since then, however, he has worsened further.   He has been suffering from progressively worsening diffuse pruritus, loss of appetite, and generalized weakness and falls.   His nephrologist and PCP has been speaking with him and his family over the past few days,   The initial plan was that he would present to the [facility] ER Monday 2/17 (ie tomorrow) for HD initiation. Today, however, he fell and sustained trauma to his knee. Given the fall and concern for knee fracture, he presented to the ER today. multiple xrays show no Fractures.      CKD5, uremia, requiring initiation of HD  Rash, seborrheic dermatitis  Pruritis  Anemia  PAD  SP catheter, chronic  Left inferior pole acute on chronic patella Fx    plan  - HD via R subclavian permacath  -4/3: ptn's insurance changed to straight medicare/medicaid. now able to go to [facility], but ptn wants to go home. will need equipment set up. this was d/w CM and ACP, daughter Yanique on the phone and wife at bedside today. today is last day of ABx. will transition pain meds to po DIlaudid from IV, will also need outptn pain management F/U, outptn wound care, home PT, need outptn HD set up  -4/2: seen prior to going to HD, no new events. tomorrow completing 10 day course of Meropenem. ongoing need for narcotics 2/2 pain.   -4/1: ptn is frustrated about a prolonged hospitalization but states he feels better overall and once medically cleared he will be ready to go home, not sooner than 4/7. completing ABx on 4/3.   -3/31: at HD today had 1.7 L UF, post HD was hypotense, gave gentle IVF , BP still a bit low. afebrile, loose BMs but not diarrhea, GI PCR not sent, RVP neg. . on Meropenem course for total of 10 days, last day 4/3, would care for decubiti and left knee wound post trauma  -3/30: Tmax 100.9, c/o dry cough and diarrhea but not watery. pain is controlled at the time of visit. wife at bedside. will check RVP panel, GI PCR, sine diarrhea non watery will not order C. Diff. ID to follow  - 3/29: cont meropenem, seen by coverage  - 3/28: pain from decubitus ulcer and Left knee post debridement continues. wound care recs in place. on Meropenem for PNA, pulm and ID following. HD as per renal. HDS  -3/27: ptn is complaining that pain post decubital and left knee debridement has been severe. his daughter is on speaker phone, wife is at bedside. his pain is controlled when meds are administered. he is very uncomfortable due to the pain at the site of decubitus debridement. lyrica helped his pain before but made him drowsy, griffin with gabapentin. he doesnt want them to be resumed. will cont dilaudid, oxycodone, oxycontin, ibuprofen. he is on Meropenem for aspiration PNA/HCAP. [facility] for DVT ppx. seen by vascular cardiology to address R IJ post shiley non occlusive DVT. full AC was not recommended. will check rpt doppler in 3-4 weeks.   - 3/26: Wound care performed bedside debridement of Left knee wound 2/2 lifted eschar, and sacral decubitus. findings c/w possible infection and mainly fat necrosis. cont Meropenem. ID following. podiatry following for gangrenous toes. will give additional single dose IV DIlaudid 2/2 severe pain post debridement.    - 3/25: wife at bedside, daughter on speaker phone at bedside. ptn is pain free at the time of visit and states he wants to cont the pain regiment he is presently on. ct C/A/P revealed: RLL PNA, prob aspiration, stercoral proctitis, decubitus ulcer w possible progression to sacral OM, R IJ nonocclusive DVT. these findings d/w ptn ,his family, wound care, ACP, Vascular, Nephrology, ID, pUlm, GI. meropenem initiated, Bactrim stopped. ptn states when he is cleared for DC, he wants to go home , not to [facility], not to SNF. daughter and wife aware.   -3/24: ptn  was seen by GI for N/V, its not clear the etiology, will obtain Ct C/A/P. ptn states he is coughing up green mucus as well  -3/23:  ptn is in good spirits, says he vomited "spit" this am, but tolerated all meals without vomiting. no abd pain, no undigested food in the vomit. afebrile, no diarrhea, RVP neg. GI consulted.   -3/21-22: ptn feels well.  pain is controlled. still no accepting facility for [facility]  -3/20: ptn states he has severe low back and LLE pain though overall is improving.   -3/19: ptn w tan crusting around SPC , states its painful. started on po Bactrim, renally dosed by ID for cellulitis.   -3/18: LLE paraesthesias are chronic, will d/w CM re dc planning to [facility]    -3/17: ptn states he is lethargic, he has LLE numbness which is new and severe pain at SPC insertion site. this was ID, d/w neuro, renal and vascular. as per ID: no sign of an acute infection recommend CT A/P.   -3/16:  urine cx from 3/15 NTD, zosyn DCed, awaiting dc to [facility], not sure will be able to go to [facility] since there is an insurance coverage conflict. HD as per renal  - 3/15: spoke w ptn's daughter today re denial from [facility] for [facility]. ptn has [employer] medicare advantage plan, which [facility] doesnt accept. i recommended to speak  to Cm and to the insurance company to find participating facilities. ptn is stable today. pain and erythema at SP catheter site has resolved today. seen by ID, will cont ZOSYN for now. UCx sent.   - 3/14: suprapubic tube changhed by , ptn has crusting at the insertion site and pain. will start Abx, urine always has sediment, will sent for cx, start Vanco/ZOsyn. ID consult called. check MRSA/MSSA PCR  -3/13: ptn is doing well. ptn has an accepting rehab facility, now pending AUTH.   -3/12: ptn is no longer constipated. doing well off prn IV DIlaudid. awaiting dc to [facility]  -3/11: ptn has no new c/o other than feeling constipated, lactulose ordered. also in preparation for [facility] will dc prn IV DIlaudid, cont prn OXy IR  -3/10:  ptn is awake, alert, wife at bedside, i instructed them to give rehab choices. also awaiting SPC change to be done by urology. pain is controlled  -3/9: seen by PT, will refer to [facility]. choices to be given in AM. this was d/w ptn, daughter, wife.   -3/8:  ptn didnt want to get PT eval done, will reorder. ptn needs NYDIA placement. PMNR consult ordered  - 3/7: ptn is alert , pain is controlled, DC planning d/w ptn and HCP, daughter Yanique  -3/6:  ptn is awake, alert, ecchymotic feet look improved, pain is controlled. DC planning to [facility]  3/4-5 - s/p b/l iliac arteries angioplasty and stent placements on 3/3. today has new ecchymoses in b/l LE, concern for arterial insufficiency. vascular aware.. wound from Left knee immobilizer is dressed and healing. pain is controlled.   LEFT UE AVF placement/scheduling will be on outptn basis as per vascular. dc planning to [facility]  - 3/3: ptn is s/p angiogram RLE : b/l iliac arteries w successful angioplasty and stents. in PACU. awaitng HD.  ptn has a pressure wound from the LLE immobilizer posteriorly proximal to the knee. its been on 2/2 knee fracture, applied by ortho and managed by ptn's wife as per ptn's and wife's request , this was d/w nursing and nurse manager ms Ruiz.. immobilizer should be managed by orthopedics and nursing. will keep it off, only keep on when repositioning for care and then remove. wound care to F/U . this was discussed at length w daughter Yanique and wife Dee Dee.   -3/1-3/2: ptn is smiling, pain free, had HD 2/28 and 3/1, awaiting RLE angiogram 3/3, on Heparin drip, euvolemic  -2/28: ptn is lethargic, awaiting RLE angiogram possible fem-fem bypass hopefully on 3/3 pending OR availability, awaiting HD today    -2/27:  plan for angiogram in am with possible angioplasty, possible stent, possible fem-fem bypass. medically cleared for angiogram and possible surgery, stent, angioplasty. pain is controlled. remains on heparin drip as per vascular. HD as per renal    -2/26:  ptn is sleeping, pain is controlled, he was seen by wound care and vascular attending. planning on angiogram 2/2 severe PAD in LE on CTA. he also spoke to HCP. ptn and HCP in agreement. ptn was started on HEPARIN drip as per vascular recs. RIJ permacath done 2/25    - 2/25: ptn states he is hallucinating, but not at present, his MS is at baseline, his pain is controlled, he still needs prn pain meds when he is moved in the bed or for testing or for HD. awaiting Permacath, AVF creation, LE bypass to be d/w Dr. Swenson and the ptn tomorrow. ptn has cardiology clearance  if he opts for. Ptn has sacral decubiti and posterior thigh and buttock decibiti and b/l heel decubiti. Z flow fabienne d/w RN, get wound care consult    -2/24: pain is controlled  if the LLE is immobile and fully extended. doesn't tolerate the knee immobilizer. has a medium condyle and acute on chronic patella fx in LEFT knee. as per vascular ptn will need iliac stent  w a fem-fem bypass, possible axillo-femoral bypass. for this surgery he would need cardiac work up . more pressing surgery is LUE AVF creation,  in IR will arrange for Permacath. for pain control: Motrin 400 mg q12H, Oxy ER 30 mg q12H, Oxy IR 10 for mod pain and dilaudid 2 mg iv for severe pain. still has pruritis though improved, will raise atarax 25 mg to tid. plan of care d/w daughter Norma Persaud , ptn and his wife. Also d/w renal, card, vascular, ACP    -2/23: Daughter Yanique is the HCP, she and the ptn filled out the paperwork. daily plan and findings d/w her and the ptn. MS is at abseline, c/o b/l LE foot pain and Left Knee pain. xrays of left knee: cannot r/o acute on chronic inferior pole patellar Fx. knee immobilizer is on, awaiting ortho consult. doubt needs any intervention awaiting Ct chest today, will add CT Left knee. ptn states itching has recurred, severe pain has recurred. will resume Atatrax ( 25 mg bid) and Oxycodone ER , but at a lower dose 15 mg q12H. cont prn analgesics. HD as per renal, awaiting Vascular consult f/u re CTA A/L &LE and recs. ptn also wants AVF surgery on this admission. Coughing has stopped    - 2/22: ptn is drowsy but arousable, calmer today, answers questions appropriately. he is pain free, he stopped coughing, he denies having pruritis: will DC:  OXY ER, Atarax, Tessalon perles.     -  2/21: ptn is tearful, a bit confused, coughing, recognizes me and family at bedside. GOC d/w daughter and wife. AMS prob delirium 2/2 acute illness +/- opiods, lyrica, atarac. will lower atarak to tid, dc lyrica, cont Oxy 2/2 ptn has severe LE pain. neuro called for eval. Head ct done yesterday w no acute findings. CTA A/P w LE run fof: severe PAD, vascular to follow up on plan fo care. plan for HD tomorrow and next week place on tiw schedule of MWF. will need tunneled catheter prior to DC and will d/w vascular scheduling of AVF. ptn wants all to be done while inptn. daughter wants to be HCP as per ptn's wishes. she was given paperwork to get it signed and witnessed and will present to nursing thereafter. details of findings and complaints and plan of care d/w daughter and wife. spent 60 min. RVP ordered. start mucinex , tessalon perles, duonebs, get CT chest to r/o PNA. pulm called    -  2/20:  ptn had RRT 2/2 AMS and tremors. no SZ, no LOC. noted to have Na 123( hyponatremia), given 500 cc NS. Gabapentin DCed. ptn had been taking gabapentin at home PTA. Pain is controlled. Pruritis resolved, tolerating HD otherwise.     - Pain management:  cont Oxycodone 10 IR q6H,  DIlaudid prn severe pain 2 mg q4H prn.   - cont outptn meds  - DVT ppx w [facility]SC

## 2025-04-03 NOTE — PROGRESS NOTE ADULT - SUBJECTIVE AND OBJECTIVE BOX
Subjective: Patient seen and examined. No new events except as noted.     REVIEW OF SYSTEMS:    CONSTITUTIONAL: No weakness, fevers or chills  EYES/ENT: No visual changes;  No vertigo or throat pain   NECK: No pain or stiffness  RESPIRATORY: No cough, wheezing, hemoptysis; No shortness of breath  CARDIOVASCULAR: No chest pain or palpitations  GASTROINTESTINAL: No abdominal or epigastric pain. No nausea, vomiting, or hematemesis; No diarrhea or constipation. No melena or hematochezia.  GENITOURINARY: No dysuria, frequency or hematuria  NEUROLOGICAL: No numbness or weakness  SKIN: No itching, burning, rashes, or lesions   All other review of systems is negative unless indicated above.    MEDICATIONS:  MEDICATIONS  (STANDING):  albuterol/ipratropium for Nebulization 3 milliLiter(s) Nebulizer every 6 hours  aspirin  chewable 81 milliGRAM(s) Oral daily  atorvastatin 40 milliGRAM(s) Oral at bedtime  chlorhexidine 2% Cloths 1 Application(s) Topical daily  clopidogrel Tablet 75 milliGRAM(s) Oral daily  Dakins Solution - 1/4 Strength 1 Application(s) Topical every 8 hours  epoetin aleah-epbx (RETACRIT) Injectable 49068 Unit(s) IV Push <User Schedule>  heparin   Injectable 5000 Unit(s) SubCutaneous every 8 hours  hydrOXYzine hydrochloride 25 milliGRAM(s) Oral three times a day  ibuprofen  Tablet. 400 milliGRAM(s) Oral every 12 hours  lactobacillus acidophilus 1 Tablet(s) Oral two times a day with meals  meropenem  IVPB 500 milliGRAM(s) IV Intermittent every 12 hours  mupirocin 2% Ointment 1 Application(s) Topical <User Schedule>  Nephro-margaret 1 Tablet(s) Oral daily  oxyCODONE  ER Tablet 30 milliGRAM(s) Oral every 12 hours  pantoprazole    Tablet 40 milliGRAM(s) Oral before breakfast  polyethylene glycol 3350 17 Gram(s) Oral daily  povidone iodine 10% Solution 1 Application(s) Topical two times a day  senna 2 Tablet(s) Oral at bedtime      PHYSICAL EXAM:  T(C): 36.4 (04-03-25 @ 07:52), Max: 37.2 (04-03-25 @ 00:07)  HR: 94 (04-03-25 @ 07:52) (91 - 113)  BP: 106/73 (04-03-25 @ 07:52) (106/73 - 145/66)  RR: 18 (04-03-25 @ 07:52) (17 - 20)  SpO2: 97% (04-03-25 @ 07:52) (93% - 97%)  Wt(kg): --  I&O's Summary    02 Apr 2025 07:01  -  03 Apr 2025 07:00  --------------------------------------------------------  IN: 200 mL / OUT: 0 mL / NET: 200 mL          Appearance: NAD  HEENT:  Dry oral mucosa, PERRL, EOMI	  Lymphatic: No lymphadenopathy  Cardiovascular: Normal S1 S2, No JVD, No murmurs, No edema  Respiratory: Lungs clear to auscultation	  Psychiatry: A & O x 3, Mood & affect appropriate  Skin: No rashes, No ecchymoses, No cyanosis	  Neurologic: Non-focal  GI/Abd: Soft, obese, nontender. Dressing to Q C/D/I. Suprapubic catheter in place  Ext: Palp femoral pulses bilat. BLE atrophic, minimal dorsi/plantarflexion bilaterally. Sensation grossly intact. LLE edematous compared to R, erythema to right toes/forefoot. mottling of right toes/forefoot/heel  LLE:  small superficial abrasion, +edema and +ecchymosis over L knee  +TTP over L knee, no TTP along remainder of extremity; compartments soft  Limited ROM at knee 2/2 pain  No gross varus/valgus laxity, but assessment limited 2/2 pain  Motor: TA/EHL/GS/FHL intact  Sensory: DP/SP/Tib/Jordan/Saph SILT  +DP pulse (symmetric relative to contralateral side), WWP   pressure wound from the LLE immobilizer posteriorly proximal to the knee.  Vascular: Peripheral pulses palpable 2+ bilaterally      LABS:    CARDIAC MARKERS:                  proBNP:   Lipid Profile:   HgA1c:   TSH:             TELEMETRY: 	    ECG:  	  RADIOLOGY:   DIAGNOSTIC TESTING:  [ ] Echocardiogram:  [ ]  Catheterization:  [ ] Stress Test:    OTHER:

## 2025-04-03 NOTE — PROGRESS NOTE ADULT - ASSESSMENT
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 HD tomorrow. family undecided on rehab at this point vs home. this would determine HD placement.     (2)Hyponatremia - Na+ improving(3/29/25)  s/p  high-Na+ bath with HD    (3)Anemia - s/p IV iron; on Retacrit with HD    (4)PAD - s/p LE bypass 3/3/25 - cyanotic changes of toes b/l - for further management after discharge    (5)CV - multifactorial LE edema; improving with increasing intradialytic UF, and with improving nutritional parameters    (6)Neuro - reduced generalized strength - getting PT/awaiting [facility]DIA    RECOMMEND:   (1)HD tomorrow per MWF schedule  (2)Retacrit with HD  (3)LE wound/bypass per vascular  (4)Dose Rx for CrCl <10 ESRD MWF    Beryl Darden DNP  Bio  [phone]

## 2025-04-03 NOTE — PROGRESS NOTE ADULT - SUBJECTIVE AND OBJECTIVE BOX
Neurology      S; patient seen. no neuro changes     Medications: MEDICATIONS  (STANDING):  albuterol/ipratropium for Nebulization 3 milliLiter(s) Nebulizer every 6 hours  aspirin  chewable 81 milliGRAM(s) Oral daily  atorvastatin 40 milliGRAM(s) Oral at bedtime  chlorhexidine 2% Cloths 1 Application(s) Topical daily  clopidogrel Tablet 75 milliGRAM(s) Oral daily  Dakins Solution - 1/4 Strength 1 Application(s) Topical every 8 hours  epoetin aleah-epbx (RETACRIT) Injectable 78168 Unit(s) IV Push <User Schedule>  heparin   Injectable 5000 Unit(s) SubCutaneous every 8 hours  hydrOXYzine hydrochloride 25 milliGRAM(s) Oral three times a day  ibuprofen  Tablet. 400 milliGRAM(s) Oral every 12 hours  lactobacillus acidophilus 1 Tablet(s) Oral two times a day with meals  meropenem  IVPB 500 milliGRAM(s) IV Intermittent every 12 hours  mupirocin 2% Ointment 1 Application(s) Topical <User Schedule>  Nephro-margaret 1 Tablet(s) Oral daily  oxyCODONE  ER Tablet 30 milliGRAM(s) Oral every 12 hours  pantoprazole    Tablet 40 milliGRAM(s) Oral before breakfast  polyethylene glycol 3350 17 Gram(s) Oral daily  povidone iodine 10% Solution 1 Application(s) Topical two times a day  senna 2 Tablet(s) Oral at bedtime    MEDICATIONS  (PRN):  acetaminophen     Tablet .. 650 milliGRAM(s) Oral every 6 hours PRN Temp greater or equal to 38C (100.4F), Mild Pain (1 - 3)  benzocaine/menthol Lozenge 1 Lozenge Oral three times a day PRN Sore Throat  bisacodyl 5 milliGRAM(s) Oral every 12 hours PRN Constipation  HYDROmorphone   Tablet 4 milliGRAM(s) Oral every 4 hours PRN Severe Pain (7 - 10)  oxyCODONE    IR 10 milliGRAM(s) Oral every 4 hours PRN Moderate Pain (4 - 6)  sodium chloride 0.9% lock flush 10 milliLiter(s) IV Push every 1 hour PRN Pre/post blood products, medications, blood draw, and to maintain line patency       Vitals:  Vital Signs Last 24 Hrs  T(C): 36.4 (03 Apr 2025 07:52), Max: 37.2 (03 Apr 2025 00:07)  T(F): 97.6 (03 Apr 2025 07:52), Max: 98.9 (03 Apr 2025 00:07)  HR: 94 (03 Apr 2025 07:52) (94 - 113)  BP: 106/73 (03 Apr 2025 07:52) (106/73 - 145/66)  BP(mean): --  RR: 18 (03 Apr 2025 07:52) (18 - 20)  SpO2: 97% (03 Apr 2025 07:52) (94% - 97%)    Parameters below as of 03 Apr 2025 07:52  Patient On (Oxygen Delivery Method): room air                  General Appearance: Appropriately dressed and in no acute distress       Head: Normocephalic, atraumatic and no dysmorphic features  Ear, Nose, and Throat: Moist mucous membranes  CVS: S1S2+  Resp: No SOB, no wheeze or rhonchi  GI: soft NT/ND  Extremities: LE PVD : dusky toes noted bilaterally L>R   Skin: + decub      Neurological Exam:  Mental Status: Awake, alert and oriented x 2-3.  Able to follow simple and complex verbal commands. Able to name and repeat. fluent speech. No obvious aphasia or dysarthria noted.   Cranial Nerves: PERRL, EOMI, VFFC, sensation V1-V3 intact,  no obvious facial asymmetry, equal elevation of palate, scm/trap 5/5, tongue is midline on protrusion. no obvious papilledema on fundoscopic exam. hearing is grossly intact.   Motor: Normal bulk, tone and strength throughout uppers 4/ lowers 0-/1 5   Sensation: Intact to light touch and pinprick throughout.     Coordination: No dysmetria on FNF   Gait: deferred, wheelchair bound     Data/Labs/Imaging which I personally reviewed.         LABS:    no new labs               < from: CT Head No Cont (02.20.25 @ 18:47) >    ACC: [id number] EXAM:  CT BRAIN   ORDERED BY: GUY DE LA CRUZ     PROCEDURE DATE:  02/20/2025          INTERPRETATION:  CLINICAL INDICATION: Altered mental status    TECHNIQUE: Axial CT scanning of the brain was obtained from the skull   base to the vertex without the administration of intravenous contrast.   Reformatted coronal and sagittal images were subsequently obtained and   reviewed.    COMPARISON: None    FINDINGS:  There is no CT evidence of acute transcortical infarct. Age-related   involutional changes and chronic microvascular ischemic changes.    There is no hydrocephalus, mass effect, or acute intracranial hemorrhage.   No extra-axial collection. Basal cisterns are patent.    The visualized paranasal sinuses and mastoid air cells are clear.    The calvarium is intact.    IMPRESSION:  No evidence of acute transcortical infarct, acute intracranial   hemorrhage, or mass effect.    --- End of Report ---            CORTNEY REARDON MD; Attending Radiologist  This document has been electronically signed. Feb 20 2025  7:07PM    < end of copied text >

## 2025-04-03 NOTE — PROGRESS NOTE ADULT - ASSESSMENT
74 year old male with CKD, sp polio, paraplegia with associated neurogenic bladder s/p suprapubic catheter who was admitted s/p fall on 2/16.   CKD - ESRD, now s/p DEYA boston 2/17, on HD  ruled out cellulitis around suprapubic cath  2/20 s/p RRT for tremors and confusion -- pt with chronic tremors although notably worse  2/20 CXR with clear lungs   SPC site with chronic/stable inflammatory changes, no drainage - no sign of SSTI  CTA abd with occlusion of b/l external iliac arteries and b/l superficial femoral arteries with distal reconstitution at popliteal arteries; patent three vessel runoff of RLE with 2 vessel runoff of LLE with occlusion of L mid anterior tibial artery with distal reconstitution  Vascular following for worsening PAD with worsening ischemic changes   -3/3 s/p RLE angiogram b/l iliac artery stents   mental status at baseline    3/16 no tenderness at suprapubic catheter site, no visible erythema at catheter site on exam  UA w/ many epithelial cells, Ucx negative  s/p zosyn 3/14-3/16  3/19 SPC site remains without erythema but now noted with some tan crusting on dressing  3/23 reporting vomiting x5 episodes, no diarrhea or other focal sx  Treated for possible SPC site infection with bactrim 3/19-3/25  3/25 CT Chest noted RLL consolidative opacities, LLL opacities - pneumonia vs atelectasis, few ill defined nodular opacities in LLL infectious vs inflammatory  3/25 CTAP with subcutaneous gas and infiltration tracking along medial R buttock, possibly related to decubitus wound with gas tracking from the external environment, possible necrotizing air forming infection; rectal wall thickening suggestive of proctitis; b/l mild hydroureteronephrosis to level of bladder with no obstruction and diffuse urothelial thickening of b/l renal collecting systems and ureters similar to 2/20  MRSA screen has been negative   pt with no resp symptoms- no cough, dyspnea or pain, may have aspirated with vomiting recently, saturating well on RA  3/26 s/p bedside debridement of L knee wound  as dry eschar  from wound edges without drainage and bedside excisional debridement of unstageable sacrum to left buttock into perineum evolving unstageable pressure injury through necrotic dermis into nonviable subcutaneous tissues, debulking debridement of necrotic tissue done by Wound care; other wounds noted including L calf to ankle wound with liquefaction necrotic drainage; R ischium wound with moist pink granular tissue and L buttock fading DTI  3/30 overnight febrile to 100.9F ?inflammatory -- now afebrile x24h, WBC wnl, remains on meropenem   dry cough noted, COVID/Flu/RSV negative;   loose stools per pt after bowel regimen--no diarrhea, some nausea with no vomiting   4/2 remains afebrile, no leukocytosis, overall feels better     Recommendations:   Continue meropenem (renally dosed) based on prior cultures to complete 10d course today 4/3/25  -given location of wounds, patient at risk of contamination, further skin/wound breakdown due to pressure 2/2 being bedbound and prolonged course of antibiotics unlikely to resolve this and will increase risk of C.diff, development of resistant organisms making treatment of infection more difficult in the future in addition to antibiotic adverse effects, therefore, complete 10d course   With continued local wound care, nutrition, offloading as able  GI following   HD per renal    Monitor temps/WBC  SPC site dressing change and care   Continue rest of care per primary team       Bridgette Ramirez M.D.  [facility]  Available on [employer] TEAMS - *PREFERRED*  [phone]  After 5pm on weekdays and all day on weekends - please call [phone]     Thank you for consulting us and involving us in the management of this patients case. In addition to reviewing history, imaging, documents, labs, microbiology, took into account antibiotic stewardship, local antibiogram and infection control strategies and potential transmission issues at time of treatment decision making process.

## 2025-04-03 NOTE — PROGRESS NOTE ADULT - ASSESSMENT
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  came in after fall and for HD.     2/20 CTH neg   \Na 123 --> now 133   A1c 5.7   TSH WNL 3.46   o/e 2/21 AAOx2, uppers 4/5, lowers limited .  2/23; family bedside upset that he has pain in leg after he was moved.  patient going for imaging now.   s/p R IJ permacath by IR 2/25 2/27 AAOx3   sopoke with family bedisde 3/17 wife says mental status okay, sometimes up and down but good   3/18 was told he has "new neuropathy" spoke iwth patient and wife at bedside. states its the same from before not a new issue.    3/21 mental status stable. c/o pubic cathter   3/23 c/o nausea today   no new changes   4/3 neuro intact. LE same.  c/o Nausea     Imrpssion  1) AMS, waxing and waing , likely multifactorial from infection, metabolic/electrolyte derrangements/ delerium / medication effect , ureemia which is imporving   2) polio  3) fall 2/2 weakness  4) hyponatremia to 123 2/20  improved 133 -->136;   back down to 129 -- 133      -  meropenum until 4/3 today   - patient wants home f/u PT   - Na 129 on most recent blood work--> 133 improved   slow correcttion  ; f/u renal    - AVF outpatient l; f/u vascular     f/u vascular; no vascluar options ; f/u with Messi from vasculare to schedule outpatient procedure   - on DAPT   - was getting oxycodone ER 30mg BID and oxy PRN IR i10 and dilauded PRN ;   consider gabapentin 200mg TID or lyrica 50mg BID fo nueorpathy if no objection ; patient states he still has pain but its better.  vascular has seen patient.  poor prognosis of LLE   - f/u psych   - limit sedating meds   - continue to monitor and correct metabolic derrangements .  - delerium precuations.   - frequent re orientation  - check b12, RPR, ammonia level if never checked    - PT/OT   - check FS, glucose control <180  - GI/DVT ppx  - Thank you for allowing me to participate in the care of this patient. Call with questions.   dc planning    spoke with wife at [facility] 4/3     Paul Limon MD  Vascular Neurology  Office: [phone]

## 2025-04-03 NOTE — PROGRESS NOTE ADULT - ASSESSMENT
74 year old man with SONDRA on CKD requiring HD, now with nausea    1. ESRD on HD (new)     2. Nausea - improved but intermittent   -favor r/t newly started HD   -PPI daily for GERD   -tolerating diet    3. Proctitis d/t constipation which has since resolved   -maintain on senna qhs with Miralax twice daily   -enema as needed     4. Decubitus Ulcer   CT noted, f/u primary, ID and wound care teams

## 2025-04-03 NOTE — PROGRESS NOTE ADULT - SUBJECTIVE AND OBJECTIVE BOX
NEPHROLOGY     Patient seen and examined reports of pain and nausea earlier, denies sob, tolerated HD yesterday and removed 1.2kg, in no acute distress.     MEDICATIONS  (STANDING):  albuterol/ipratropium for Nebulization 3 milliLiter(s) Nebulizer every 6 hours  aspirin  chewable 81 milliGRAM(s) Oral daily  atorvastatin 40 milliGRAM(s) Oral at bedtime  chlorhexidine 2% Cloths 1 Application(s) Topical daily  clopidogrel Tablet 75 milliGRAM(s) Oral daily  Dakins Solution - 1/4 Strength 1 Application(s) Topical every 8 hours  epoetin aleah-epbx (RETACRIT) Injectable 11856 Unit(s) IV Push <User Schedule>  heparin   Injectable 5000 Unit(s) SubCutaneous every 8 hours  hydrOXYzine hydrochloride 25 milliGRAM(s) Oral three times a day  ibuprofen  Tablet. 400 milliGRAM(s) Oral every 12 hours  lactobacillus acidophilus 1 Tablet(s) Oral two times a day with meals  meropenem  IVPB 500 milliGRAM(s) IV Intermittent every 12 hours  mupirocin 2% Ointment 1 Application(s) Topical <User Schedule>  Nephro-margaret 1 Tablet(s) Oral daily  oxyCODONE  ER Tablet 30 milliGRAM(s) Oral every 12 hours  pantoprazole    Tablet 40 milliGRAM(s) Oral before breakfast  polyethylene glycol 3350 17 Gram(s) Oral daily  povidone iodine 10% Solution 1 Application(s) Topical two times a day  senna 2 Tablet(s) Oral at bedtime    VITALS:  T(C): , Max: 37.2 (04-03-25 @ 00:07)  T(F): , Max: 98.9 (04-03-25 @ 00:07)  HR: 94 (04-03-25 @ 07:52)  BP: 106/73 (04-03-25 @ 07:52)  BP(mean): --  RR: 18 (04-03-25 @ 07:52)  SpO2: 97% (04-03-25 @ 07:52)    I and O's:    04-02 @ 07:01  -  04-03 @ 07:00  --------------------------------------------------------  IN: 200 mL / OUT: 0 mL / NET: 200 mL    PHYSICAL EXAM:  Constitutional: alert, NAD  HEENT: NCAT, DMM  Neck: Supple, No JVD  Respiratory: CTA-b/l  Cardiovascular: RRR s1s2, no m/r/g  Gastrointestinal: BS+, soft, NT/ND  : (+)suprapubic cath  Extremities: 1+ b/l LE edema  Neurological: reduced generalized strength  Back: no CVAT b/l  Skin: (+)cyanotic changes b/l feet  Access: RIJ tunneled cath    LABS:  No new labs

## 2025-04-03 NOTE — PROGRESS NOTE ADULT - SUBJECTIVE AND OBJECTIVE BOX
[facility]  SABINO Griffin Y. Patel, S. Shah, G. Casimir  [phone]  ([phone] - weekdays after 5pm and weekends)    Name: BRIAN DEMPSEY  Age/Gender: 75y Male  MRN: [id number]    Interval History:  Patient seen and examined this morning.   Reports he felt nauseous earlier.  Denies fever or any other complaints.   Notes reviewed  No concerning overnight events  Afebrile   Allergies: No Known Allergies      Objective:  Vitals:   T(F): 97.6 (04-03-25 @ 07:52), Max: 98.9 (04-03-25 @ 00:07)  HR: 94 (04-03-25 @ 07:52) (94 - 113)  BP: 106/73 (04-03-25 @ 07:52) (106/73 - 145/66)  RR: 18 (04-03-25 @ 07:52) (18 - 20)  SpO2: 97% (04-03-25 @ 07:52) (94% - 97%)  Physical Examination:  General: no acute distress, nontoxic appearing   HEENT: normocephalic, atraumatic, anicteric  Respiratory: no acc muscle use, breathing comfortably  Cardiovascular: S1 and S2 present  Gastrointestinal: soft, nontender, nondistended, SPC  Extremities: LE ischemic changes, dec LE edema    Laboratory Studies:  CBC:   WBC Trend:  6.14 04-01-25 @ 08:37  9.37 03-31-25 @ 08:49  8.20 03-28-25 @ 10:23    CMP:   Creatinine: 2.87 mg/dL (04-01-25 @ 08:37)  Creatinine: 3.63 mg/dL (03-31-25 @ 08:49)  Creatinine: 2.52 mg/dL (03-29-25 @ 10:16)  Creatinine: 3.21 mg/dL (03-28-25 @ 10:24)    Microbiology: reviewed   03-31-25 @ 07:28 SARS-CoV-2 NotDetec/Influenza A NotDetec/Influenza B NotDetec/RSV NotDetec    Radiology: reviewed     Medications:  acetaminophen     Tablet .. 650 milliGRAM(s) Oral every 6 hours PRN  albuterol/ipratropium for Nebulization 3 milliLiter(s) Nebulizer every 6 hours  aspirin  chewable 81 milliGRAM(s) Oral daily  atorvastatin 40 milliGRAM(s) Oral at bedtime  benzocaine/menthol Lozenge 1 Lozenge Oral three times a day PRN  bisacodyl 5 milliGRAM(s) Oral every 12 hours PRN  chlorhexidine 2% Cloths 1 Application(s) Topical daily  clopidogrel Tablet 75 milliGRAM(s) Oral daily  Dakins Solution - 1/4 Strength 1 Application(s) Topical every 8 hours  epoetin aleah-epbx (RETACRIT) Injectable 52589 Unit(s) IV Push <User Schedule>  heparin   Injectable 5000 Unit(s) SubCutaneous every 8 hours  HYDROmorphone   Tablet 4 milliGRAM(s) Oral every 4 hours PRN  hydrOXYzine hydrochloride 25 milliGRAM(s) Oral three times a day  ibuprofen  Tablet. 400 milliGRAM(s) Oral every 12 hours  lactobacillus acidophilus 1 Tablet(s) Oral two times a day with meals  meropenem  IVPB 500 milliGRAM(s) IV Intermittent every 12 hours  mupirocin 2% Ointment 1 Application(s) Topical <User Schedule>  Nephro-margaret 1 Tablet(s) Oral daily  oxyCODONE    IR 10 milliGRAM(s) Oral every 4 hours PRN  oxyCODONE  ER Tablet 30 milliGRAM(s) Oral every 12 hours  pantoprazole    Tablet 40 milliGRAM(s) Oral before breakfast  polyethylene glycol 3350 17 Gram(s) Oral daily  povidone iodine 10% Solution 1 Application(s) Topical two times a day  senna 2 Tablet(s) Oral at bedtime  sodium chloride 0.9% lock flush 10 milliLiter(s) IV Push every 1 hour PRN    Current Antimicrobials:  meropenem  IVPB 500 milliGRAM(s) IV Intermittent every 12 hours    Prior/Completed Antimicrobials:  piperacillin/tazobactam IVPB.  piperacillin/tazobactam IVPB.  piperacillin/tazobactam IVPB.-  piperacillin/tazobactam IVPB.-  trimethoprim   80 mG/sulfamethoxazole 400 mG  trimethoprim  160 mG/sulfamethoxazole 800 mG  vancomycin  IVPB  vancomycin  IVPB

## 2025-04-04 NOTE — PROGRESS NOTE ADULT - SUBJECTIVE AND OBJECTIVE BOX
INTERVAL HPI/OVERNIGHT EVENTS:    some nausea this morning when seen, no vomiting     MEDICATIONS  (STANDING):  albuterol/ipratropium for Nebulization 3 milliLiter(s) Nebulizer every 6 hours  aspirin  chewable 81 milliGRAM(s) Oral daily  atorvastatin 40 milliGRAM(s) Oral at bedtime  chlorhexidine 2% Cloths 1 Application(s) Topical daily  clopidogrel Tablet 75 milliGRAM(s) Oral daily  Dakins Solution - 1/4 Strength 1 Application(s) Topical every 8 hours  epoetin aleah-epbx (RETACRIT) Injectable 55783 Unit(s) IV Push <User Schedule>  heparin   Injectable 5000 Unit(s) SubCutaneous every 8 hours  hydrOXYzine hydrochloride 25 milliGRAM(s) Oral three times a day  ibuprofen  Tablet. 400 milliGRAM(s) Oral every 12 hours  lactobacillus acidophilus 1 Tablet(s) Oral two times a day with meals  meropenem  IVPB 500 milliGRAM(s) IV Intermittent every 12 hours  mupirocin 2% Ointment 1 Application(s) Topical <User Schedule>  Nephro-margaret 1 Tablet(s) Oral daily  oxyCODONE  ER Tablet 30 milliGRAM(s) Oral every 12 hours  pantoprazole    Tablet 40 milliGRAM(s) Oral before breakfast  polyethylene glycol 3350 17 Gram(s) Oral daily  povidone iodine 10% Solution 1 Application(s) Topical two times a day  senna 2 Tablet(s) Oral at bedtime    MEDICATIONS  (PRN):  acetaminophen     Tablet .. 650 milliGRAM(s) Oral every 6 hours PRN Temp greater or equal to 38C (100.4F), Mild Pain (1 - 3)  benzocaine/menthol Lozenge 1 Lozenge Oral three times a day PRN Sore Throat  bisacodyl 5 milliGRAM(s) Oral every 12 hours PRN Constipation  HYDROmorphone   Tablet 4 milliGRAM(s) Oral every 4 hours PRN Severe Pain (7 - 10)  oxyCODONE    IR 10 milliGRAM(s) Oral every 4 hours PRN Moderate Pain (4 - 6)  sodium chloride 0.9% lock flush 10 milliLiter(s) IV Push every 1 hour PRN Pre/post blood products, medications, blood draw, and to maintain line patency      Allergies    No Known Allergies    Intolerances        Review of Systems:    General:  No wt loss, fevers, chills, night sweats, fatigue   Eyes:  Good vision, no reported pain  ENT:  No sore throat, pain, runny nose, dysphagia  CV:  No pain, palpitations, hypo/hypertension  Resp:  No dyspnea, cough, tachypnea, wheezing  GI:  No pain, No nausea, No vomiting, No diarrhea, No constipation, No weight loss, No fever, No pruritis, No rectal bleeding, No melena, No dysphagia  :  No pain, bleeding, incontinence, nocturia  Muscle:  No pain, weakness  Neuro:  No weakness, tingling, memory problems  Psych:  No fatigue, insomnia, mood problems, depression  Endocrine:  No polyuria, polydypsia, cold/heat intolerance  Heme:  No petechiae, ecchymosis, easy bruisability  Skin:  No rash, tattoos, scars, edema      Vital Signs Last 24 Hrs  T(C): 36.4 (03 Apr 2025 07:52), Max: 37.2 (03 Apr 2025 00:07)  T(F): 97.6 (03 Apr 2025 07:52), Max: 98.9 (03 Apr 2025 00:07)  HR: 94 (03 Apr 2025 07:52) (94 - 113)  BP: 106/73 (03 Apr 2025 07:52) (106/73 - 145/66)  BP(mean): --  RR: 18 (03 Apr 2025 07:52) (18 - 20)  SpO2: 97% (03 Apr 2025 07:52) (94% - 97%)    Parameters below as of 03 Apr 2025 07:52  Patient On (Oxygen Delivery Method): room air        PHYSICAL EXAM:    Constitutional: NAD  HEENT: EOMI, throat clear  Neck: No LAD, supple  Respiratory: CTA and P  Cardiovascular: S1 and S2, RRR, no M  Gastrointestinal: BS+, soft, NT/ND, neg HSM,  Extremities: No peripheral edema, neg clubbing, cyanosis  Vascular: 2+ peripheral pulses  Neurological: A/O   Psychiatric: Normal mood, normal affect  Skin: No rashes      LABS:                RADIOLOGY & ADDITIONAL TESTS:

## 2025-04-04 NOTE — PROGRESS NOTE ADULT - ASSESSMENT
74 year old male with CKD, sp polio, paraplegia with associated neurogenic bladder s/p suprapubic catheter who was admitted s/p fall on 2/16.   CKD - ESRD, now s/p DEYA boston 2/17, on HD  ruled out cellulitis around suprapubic cath  2/20 s/p RRT for tremors and confusion -- pt with chronic tremors although notably worse  2/20 CXR with clear lungs   SPC site with chronic/stable inflammatory changes, no drainage - no sign of SSTI  CTA abd with occlusion of b/l external iliac arteries and b/l superficial femoral arteries with distal reconstitution at popliteal arteries; patent three vessel runoff of RLE with 2 vessel runoff of LLE with occlusion of L mid anterior tibial artery with distal reconstitution  Vascular following for worsening PAD with worsening ischemic changes   -3/3 s/p RLE angiogram b/l iliac artery stents   mental status at baseline    3/16 no tenderness at suprapubic catheter site, no visible erythema at catheter site on exam  UA w/ many epithelial cells, Ucx negative  s/p zosyn 3/14-3/16  3/19 SPC site remains without erythema but now noted with some tan crusting on dressing  3/23 reporting vomiting x5 episodes, no diarrhea or other focal sx  Treated for possible SPC site infection with bactrim 3/19-3/25  3/25 CT Chest noted RLL consolidative opacities, LLL opacities - pneumonia vs atelectasis, few ill defined nodular opacities in LLL infectious vs inflammatory  3/25 CTAP with subcutaneous gas and infiltration tracking along medial R buttock, possibly related to decubitus wound with gas tracking from the external environment, possible necrotizing air forming infection; rectal wall thickening suggestive of proctitis; b/l mild hydroureteronephrosis to level of bladder with no obstruction and diffuse urothelial thickening of b/l renal collecting systems and ureters similar to 2/20  MRSA screen has been negative   pt with no resp symptoms- no cough, dyspnea or pain, may have aspirated with vomiting earlier, saturating well on RA  3/26 s/p bedside debridement of L knee wound  as dry eschar  from wound edges without drainage and bedside excisional debridement of unstageable sacrum to left buttock into perineum evolving unstageable pressure injury through necrotic dermis into nonviable subcutaneous tissues, debulking debridement of necrotic tissue done by Wound care; other wounds noted including L calf to ankle wound with liquefaction necrotic drainage; R ischium wound with moist pink granular tissue and L buttock fading DTI  3/30 overnight febrile to 100.9F ?inflammatory -- now afebrile >24h, WBC wnl, remains on meropenem   dry cough noted, COVID/Flu/RSV negative  loose stools per pt after bowel regimen--no diarrhea, some nausea with no vomiting   4/4 remains afebrile, no leukocytosis, overall feels better     Recommendations:   given location of wounds, patient at risk of contamination, further skin/wound breakdown due to pressure 2/2 being bedbound and prolonged course of antibiotics unlikely to resolve this and will increase risk of C.diff, development of resistant organisms making treatment of infection more difficult in the future in addition to antibiotic adverse effects, therefore, completed 10d course of meropenem on 4/3 -- discontinued now   Continue off antibiotics   Continue local wound care, nutrition, offloading as able  HD per renal    SPC site dressing change and care   Continue rest of care per primary team   Stable from ID standpoint at this time.     Over the weekend Dr. Dat Davis will be covering for our group. If you have any questions, concerns or new micro data please reach out to them using TEAMS *PREFERRED* or by calling our service at [phone].     Bridgette Ramirez M.D.  [facility]  Available on [employer] TEAMS - *PREFERRED*  [phone]  After 5pm on weekdays and all day on weekends - please call [phone]     Thank you for consulting us and involving us in the management of this patients case. In addition to reviewing history, imaging, documents, labs, microbiology, took into account antibiotic stewardship, local antibiogram and infection control strategies and potential transmission issues at time of treatment decision making process.

## 2025-04-04 NOTE — CHART NOTE - NSCHARTNOTEFT_GEN_A_CORE
Asked by DR Martinez to call surgery for OR sacral decub debridement and placing colostomy ( to protect wound) as recommended by wound care team. Spoke to [facility] resident Nigel Marks, who recommended to   place the order for CT A/P with IV contrast,  and call them back once result available. As the patient is on HD, got clearance from Dr Whitehead for IV contrast.   Alicia Tian NP  Medicine ACP

## 2025-04-04 NOTE — PROGRESS NOTE ADULT - ASSESSMENT
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 HD tomorrow. family undecided on rehab at this point vs home. this would determine HD placement.     (2)Hyponatremia - Na+ improving(3/29/25)  s/p  high-Na+ bath with HD    (3)Anemia - s/p IV iron; on Retacrit with HD    (4)PAD - s/p LE bypass 3/3/25 - cyanotic changes of toes b/l - for further management after discharge    (5)CV - multifactorial LE edema; improving with increasing intradialytic UF, and with improving nutritional parameters    (6)Neuro - reduced generalized strength - getting PT/awaiting NYDIA    RECOMMEND:   (1)HD today (completer) per MWF schedule  (2)Retacrit with HD  (3)LE wound/bypass per vascular  (4)Dose Rx for CrCl <10 ESRD MWF  (5)DC planning? PJI?    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 HD tomorrow. family undecided on rehab at this point vs home. this would determine HD placement.     (2)Hyponatremia - Na+ improving(3/29/25)  s/p  high-Na+ bath with HD    (3)Anemia - s/p IV iron; on Retacrit with HD    (4)PAD - s/p LE bypass 3/3/25 - cyanotic changes of toes b/l - for further management after discharge    (5)CV - multifactorial LE edema; improving with increasing intradialytic UF, and with improving nutritional parameters    (6)Neuro - reduced generalized strength - getting PT/awaiting NYDIA    RECOMMEND:   (1)HD today (completed) per MWF schedule  (2)Retacrit with HD  (3)LE wound/bypass per vascular  (4)Dose Rx for CrCl <10 ESRD MWF  (5)DC planning? PJI?    Beryl Darden, NAFISA  [employer]  [phone]

## 2025-04-04 NOTE — PROGRESS NOTE ADULT - SUBJECTIVE AND OBJECTIVE BOX
[facility]  SABINO Griffin Y. Patel, S. Shah, G. Casimir  [phone]  ([phone] - weekdays after 5pm and weekends)    Name: BRIAN DEMPSEY  Age/Gender: 75y Male  MRN: [id number]    Interval History:  Patient seen and examined this morning.   No new complaints noted.  Notes reviewed  No concerning overnight events  Afebrile   Allergies: No Known Allergies      Objective:  Vitals:   T(F): 97.7 (04-04-25 @ 08:12), Max: 98.4 (04-03-25 @ 17:51)  HR: 81 (04-04-25 @ 08:12) (81 - 94)  BP: 109/64 (04-04-25 @ 08:12) (90/54 - 111/67)  RR: 18 (04-04-25 @ 08:12) (18 - 18)  SpO2: 94% (04-04-25 @ 08:12) (94% - 97%)  Physical Examination:  General: no acute distress, nontoxic appearing   HEENT: normocephalic, atraumatic, anicteric  Respiratory: no acc muscle use, breathing comfortably  Cardiovascular: S1 and S2 present  Gastrointestinal: soft, nontender, nondistended, SPC  Extremities: LE ischemic changes, LE edema    Laboratory Studies:  CBC:   WBC Trend:  6.14 04-01-25 @ 08:37  9.37 03-31-25 @ 08:49  8.20 03-28-25 @ 10:23    CMP:   Creatinine: 2.87 mg/dL (04-01-25 @ 08:37)  Creatinine: 3.63 mg/dL (03-31-25 @ 08:49)  Creatinine: 2.52 mg/dL (03-29-25 @ 10:16)  Creatinine: 3.21 mg/dL (03-28-25 @ 10:24)    Microbiology: reviewed   03-31-25 @ 07:28 SARS-CoV-2 NotDetec/Influenza A NotDetec/Influenza B NotDetec/RSV NotDetec    Radiology: reviewed     Medications:  acetaminophen     Tablet .. 650 milliGRAM(s) Oral every 6 hours PRN  albuterol/ipratropium for Nebulization 3 milliLiter(s) Nebulizer every 6 hours  aspirin  chewable 81 milliGRAM(s) Oral daily  atorvastatin 40 milliGRAM(s) Oral at bedtime  benzocaine/menthol Lozenge 1 Lozenge Oral three times a day PRN  bisacodyl 5 milliGRAM(s) Oral every 12 hours PRN  chlorhexidine 2% Cloths 1 Application(s) Topical daily  clopidogrel Tablet 75 milliGRAM(s) Oral daily  Dakins Solution - 1/4 Strength 1 Application(s) Topical every 8 hours  epoetin aleah-epbx (RETACRIT) Injectable 43455 Unit(s) IV Push <User Schedule>  heparin   Injectable 5000 Unit(s) SubCutaneous every 8 hours  HYDROmorphone   Tablet 4 milliGRAM(s) Oral every 4 hours PRN  hydrOXYzine hydrochloride 25 milliGRAM(s) Oral three times a day  ibuprofen  Tablet. 400 milliGRAM(s) Oral every 12 hours  lactobacillus acidophilus 1 Tablet(s) Oral two times a day with meals  meropenem  IVPB 500 milliGRAM(s) IV Intermittent every 12 hours  mupirocin 2% Ointment 1 Application(s) Topical <User Schedule>  Nephro-margaret 1 Tablet(s) Oral daily  oxyCODONE    IR 10 milliGRAM(s) Oral every 4 hours PRN  oxyCODONE  ER Tablet 30 milliGRAM(s) Oral every 12 hours  pantoprazole    Tablet 40 milliGRAM(s) Oral before breakfast  polyethylene glycol 3350 17 Gram(s) Oral daily  povidone iodine 10% Solution 1 Application(s) Topical two times a day  senna 2 Tablet(s) Oral at bedtime  sodium chloride 0.9% lock flush 10 milliLiter(s) IV Push every 1 hour PRN    Current Antimicrobials:  meropenem  IVPB 500 milliGRAM(s) IV Intermittent every 12 hours    Prior/Completed Antimicrobials:  piperacillin/tazobactam IVPB.  piperacillin/tazobactam IVPB.  piperacillin/tazobactam IVPB.-  piperacillin/tazobactam IVPB.-  trimethoprim   80 mG/sulfamethoxazole 400 mG  trimethoprim  160 mG/sulfamethoxazole 800 mG  vancomycin  IVPB  vancomycin  IVPB

## 2025-04-04 NOTE — PROGRESS NOTE ADULT - ASSESSMENT
74 year old man with SONDRA on CKD requiring HD, now with nausea    1. ESRD on HD (new)     2. Nausea - improved but intermittent   -favor r/t newly started HD   -PPI daily for GERD   -tolerating diet    3. Proctitis d/t constipation which has since resolved   -maintain on senna qhs with Miralax twice daily   -enema as needed     4. Decubitus Ulcer   CT noted, f/u primary, ID and wound care teams   possible diverting ostomy

## 2025-04-04 NOTE — PROGRESS NOTE ADULT - ASSESSMENT
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 He is well known to me from multiple recent admisions  s/p admissions at [facility] 12/20-12/25/24 and 2/4-2/7 with SONDRA on CKD with associated uremic symptoms.   At the last admission he had expressed strong interest to try to hold off with HD intiation but he has been getting worse clinically at home.  His SONDRA and uremic symptoms significantly improved after the first admission; they improved a to mild extent during the 2nd admission to the point where he was able to be discharged without HD. Since then, however, he has worsened further.   He has been suffering from progressively worsening diffuse pruritus, loss of appetite, and generalized weakness and falls.   His nephrologist and PCP has been speaking with him and his family over the past few days,   The initial plan was that he would present to the [facility] ER Monday 2/17 (ie tomorrow) for HD initiation. Today, however, he fell and sustained trauma to his knee. Given the fall and concern for knee fracture, he presented to the ER today. multiple xrays show no Fractures.      CKD5, uremia, requiring initiation of HD  Rash, seborrheic dermatitis  Pruritis  Anemia  PAD  SP catheter, chronic  Left inferior pole acute on chronic patella Fx    plan  - HD via R subclavian permacath  -4/4: dr dugan from wound care had an extensive conversation w the ptn and daughter yanique on the phone at bedside. ptn needs general surgery consult for OR debridement of necrotic tissue of the sacral decubiti and diverting colostomy. ptn did not tolerate debridement at the bedside. . completed Abx for PNA  -4/3: ptn's insurance changed to straight medicare/medicaid. now able to go to [facility], but ptn wants to go home. will need equipment set up. this was d/w CM and ACP, daughter Yanique on the phone and wife at bedside today. today is last day of ABx. will transition pain meds to po DIlaudid from IV, will also need outptn pain management F/U, outptn wound care, home PT, need outptn HD set up  -4/2: seen prior to going to HD, no new events. tomorrow completing 10 day course of Meropenem. ongoing need for narcotics 2/2 pain.   -4/1: ptn is frustrated about a prolonged hospitalization but states he feels better overall and once medically cleared he will be ready to go home, not sooner than 4/7. completing ABx on 4/3.   -3/31: at HD today had 1.7 L UF, post HD was hypotense, gave gentle IVF , BP still a bit low. afebrile, loose BMs but not diarrhea, GI PCR not sent, RVP neg. . on Meropenem course for total of 10 days, last day 4/3, would care for decubiti and left knee wound post trauma  -3/30: Tmax 100.9, c/o dry cough and diarrhea but not watery. pain is controlled at the time of visit. wife at bedside. will check RVP panel, GI PCR, sine diarrhea non watery will not order C. Diff. ID to follow  - 3/29: cont meropenem, seen by coverage  - 3/28: pain from decubitus ulcer and Left knee post debridement continues. wound care recs in place. on Meropenem for PNA, pulm and ID following. HD as per renal. HDS  -3/27: ptn is complaining that pain post decubital and left knee debridement has been severe. his daughter is on speaker phone, wife is at bedside. his pain is controlled when meds are administered. he is very uncomfortable due to the pain at the site of decubitus debridement. lyrica helped his pain before but made him drowsy, griffin with gabapentin. he doesnt want them to be resumed. will cont dilaudid, oxycodone, oxycontin, ibuprofen. he is on Meropenem for aspiration PNA/HCAP. [facility] [place] [facility] p[facility]. seen by vascular cardiology to address R IJ post shiley non occlusive DVT. full AC was not recommended. will check rpt doppler in 3-4 weeks.   - 3/26: Wound care performed bedside debridement of Left knee wound 2/2 lifted eschar, and sacral decubitus. findings c/w possible infection and mainly fat necrosis. cont Meropenem. ID following. podiatry following for gangrenous toes. will give additional single dose IV DIlaudid 2/2 severe pain post debridement.    - 3/25: wife at bedside, daughter on speaker phone at bedside. ptn is pain free at the time of visit and states he wants to cont the pain regiment he is presently on. ct C/A/P revealed: RLL PNA, prob aspiration, stercoral proctitis, decubitus ulcer w possible progression to sacral OM, R IJ nonocclusive DVT. these findings d/w ptn ,his family, wound care, ACP, Vascular, Nephrology, ID, pUlm, GI. meropenem initiated, Bactrim stopped. ptn states when he is cleared for DC, he wants to go home , not to [facility], not to SNF. daughter and wife aware.   -3/24: ptn  was seen by GI for N/V, its not clear the etiology, will obtain Ct C/A/P. ptn states he is coughing up green mucus as well  -3/23:  ptn is in good spirits, says he vomited "spit" this am, but tolerated all meals without vomiting. no abd pain, no undigested food in the vomit. afebrile, no diarrhea, RVP neg. GI consulted.   -3/21-22: ptn feels well.  pain is controlled. still no accepting facility for [facility]  -3/20: ptn states he has severe low back and LLE pain though overall is improving.   -3/19: ptn w tan crusting around SPC , states its painful. started on po Bactrim, renally dosed by ID for cellulitis.   -3/18: LLE paraesthesias are chronic, will d/w CM re dc planning to [facility]    -3/17: ptn states he is lethargic, he has LLE numbness which is new and severe pain at SPC insertion site. this was ID, d/w neuro, renal and vascular. as per ID: no sign of an acute infection recommend CT A/P.   -3/16:  urine cx from 3/15 NTD, zosyn DCed, awaiting dc to [facility], not sure will be able to go to [facility] since there is an insurance coverage conflict. HD as per renal  - 3/15: spoke w ptn's daughter today re denial from [facility] for [facility]. ptn has [facility] medicare advantage plan, which [facility] doesnt accept. i recommended to speak  to Cm and to the insurance company to find participating facilities. ptn is stable today. pain and erythema at SP catheter site has resolved today. seen by ID, will cont ZOSYN for now. UCx sent.   - 3/14: suprapubic tube changhed by BETTY, ptn has crusting at the insertion site and pain. will start Abx, urine always has sediment, will sent for cx, start Vanco/ZOsyn. ID consult called. check MRSA/MSSA PCR  -3/13: ptn is doing well. ptn has an accepting rehab facility, now pending AUTH.   -3/12: ptn is no longer constipated. doing well off prn IV DIlaudid. awaiting dc to [facility]  -3/11: ptn has no new c/o other than feeling constipated, lactulose ordered. also in preparation for [facility] will dc prn IV DIlaudid, cont prn OXy IR  -3/10:  ptn is awake, alert, wife at bedside, i instructed them to give rehab choices. also awaiting SPC change to be done by urology. pain is controlled  -3/9: seen by PT, will refer to [facility]. choices to be given in AM. this was d/w ptn, daughter, wife.   -3/8:  ptn didnt want to get PT eval done, will reorder. ptn needs [facility] placement. PMNR consult ordered  - 3/7: ptn is alert , pain is controlled, DC planning d/w ptn and HCP, daughter Yanique  -3/6:  ptn is awake, alert, ecchymotic feet look improved, pain is controlled. DC planning to [facility]  3/4-5 - s/p b/l iliac arteries angioplasty and stent placements on 3/3. today has new ecchymoses in b/l LE, concern for arterial insufficiency. vascular aware.. wound from Left knee immobilizer is dressed and healing. pain is controlled.   LEFT UE AVF placement/scheduling will be on outptn basis as per vascular. dc planning to [facility]  - 3/3: ptn is s/p angiogram RLE : b/l iliac arteries w successful angioplasty and stents. in PACU. awaitng HD.  ptn has a pressure wound from the LLE immobilizer posteriorly proximal to the knee. its been on 2/2 knee fracture, applied by ortho and managed by ptn's wife as per ptn's and wife's request , this was d/w nursing and nurse manager ms Ruiz.. immobilizer should be managed by orthopedics and nursing. will keep it off, only keep on when repositioning for care and then remove. wound care to F/U . this was discussed at length w daughter Yanique and wife Dee Dee.   -3/1-3/2: ptn is smiling, pain free, had HD 2/28 and 3/1, awaiting RLE angiogram 3/3, on Heparin drip, euvolemic  -2/28: ptn is lethargic, awaiting RLE angiogram possible fem-fem bypass hopefully on 3/3 pending OR availability, awaiting HD today    -2/27:  plan for angiogram in am with possible angioplasty, possible stent, possible fem-fem bypass. medically cleared for angiogram and possible surgery, stent, angioplasty. pain is controlled. remains on heparin drip as per vascular. HD as per renal    -2/26:  ptn is sleeping, pain is controlled, he was seen by wound care and vascular attending. planning on angiogram 2/2 severe PAD in LE on CTA. he also spoke to HCP. ptn and HCP in agreement. ptn was started on HEPARIN drip as per vascular recs. RIJ permacath done 2/25    - 2/25: ptn states he is hallucinating, but not at present, his MS is at baseline, his pain is controlled, he still needs prn pain meds when he is moved in the bed or for testing or for HD. awaiting Permacath, AVF creation, LE bypass to be d/w Dr. Swenson and the ptn tomorrow. ptn has cardiology clearance  if he opts for. Ptn has sacral decubiti and posterior thigh and buttock decibiti and b/l heel decubiti. Z flow bootie d/w RN, get wound care consult    -2/24: pain is controlled  if the LLE is immobile and fully extended. doesn't tolerate the knee immobilizer. has a medium condyle and acute on chronic patella fx in LEFT knee. as per vascular ptn will need iliac stent  w a fem-fem bypass, possible axillo-femoral bypass. for this surgery he would need cardiac work up . more pressing surgery is LUE AVF creation,  in IR will arrange for Permacath. for pain control: Motrin 400 mg q12H, Oxy ER 30 mg q12H, Oxy IR 10 for mod pain and dilaudid 2 mg iv for severe pain. still has pruritis though improved, will raise atarax 25 mg to tid. plan of care d/w daughter Norma Persaud , ptn and his wife. Also d/w renal, card, vascular, ACP    -2/23: Daughter Yanique is the HCP, she and the ptn filled out the paperwork. daily plan and findings d/w her and the ptn. MS is at abseline, c/o b/l LE foot pain and Left Knee pain. xrays of left knee: cannot r/o acute on chronic inferior pole patellar Fx. knee immobilizer is on, awaiting ortho consult. doubt needs any intervention awaiting Ct chest today, will add CT Left knee. ptn states itching has recurred, severe pain has recurred. will resume Atatrax ( 25 mg bid) and Oxycodone ER , but at a lower dose 15 mg q12H. cont prn analgesics. HD as per renal, awaiting Vascular consult f/u re CTA A/L &LE and recs. ptn also wants AVF surgery on this admission. Coughing has stopped    - 2/22: ptn is drowsy but arousable, calmer today, answers questions appropriately. he is pain free, he stopped coughing, he denies having pruritis: will DC:  OXY ER, Atarax, Tessalon perles.     -  2/21: ptn is tearful, a bit confused, coughing, recognizes me and family at bedside. GOC d/w daughter and wife. AMS prob delirium 2/2 acute illness +/- opiods, lyrica, atarac. will lower atarak to tid, dc lyrica, cont Oxy 2/2 ptn has severe LE pain. neuro called for eval. Head ct done yesterday w no acute findings. CTA A/P w LE run fof: severe PAD, vascular to follow up on plan fo care. plan for HD tomorrow and next week place on tiw schedule of MWF. will need tunneled catheter prior to DC and will d/w vascular scheduling of AVF. ptn wants all to be done while inptn. daughter wants to be HCP as per ptn's wishes. she was given paperwork to get it signed and witnessed and will present to nursing thereafter. details of findings and complaints and plan of care d/w daughter and wife. spent 60 min. RVP ordered. start mucinex , tessalon perles, duonebs, get CT chest to r/o PNA. pulm called    -  2/20:  ptn had RRT 2/2 AMS and tremors. no SZ, no LOC. noted to have Na 123( hyponatremia), given 500 cc NS. Gabapentin DCed. ptn had been taking gabapentin at home PTA. Pain is controlled. Pruritis resolved, tolerating HD otherwise.     - Pain management:  cont Oxycodone 10 IR q6H,  DIlaudid prn severe pain 2 mg q4H prn.   - cont outptn meds  - DVT ppx w [facility]

## 2025-04-04 NOTE — PROGRESS NOTE ADULT - SUBJECTIVE AND OBJECTIVE BOX
Date of Service: 04-04-25 @ 12:38    Patient is a 75y old  Male who presents with a chief complaint of ESRD requiring HD, uremia (04 Apr 2025 12:08)      Any change in ROS:   Seen in HD this AM  Denies CP, SOB     MEDICATIONS  (STANDING):  albuterol/ipratropium for Nebulization 3 milliLiter(s) Nebulizer every 6 hours  aspirin  chewable 81 milliGRAM(s) Oral daily  atorvastatin 40 milliGRAM(s) Oral at bedtime  chlorhexidine 2% Cloths 1 Application(s) Topical daily  clopidogrel Tablet 75 milliGRAM(s) Oral daily  Dakins Solution - 1/4 Strength 1 Application(s) Topical every 8 hours  epoetin aleah-epbx (RETACRIT) Injectable 44165 Unit(s) IV Push <User Schedule>  heparin   Injectable 5000 Unit(s) SubCutaneous every 8 hours  hydrOXYzine hydrochloride 25 milliGRAM(s) Oral three times a day  ibuprofen  Tablet. 400 milliGRAM(s) Oral every 12 hours  lactobacillus acidophilus 1 Tablet(s) Oral two times a day with meals  mupirocin 2% Ointment 1 Application(s) Topical <User Schedule>  Nephro-margaret 1 Tablet(s) Oral daily  oxyCODONE  ER Tablet 30 milliGRAM(s) Oral every 12 hours  pantoprazole    Tablet 40 milliGRAM(s) Oral before breakfast  polyethylene glycol 3350 17 Gram(s) Oral daily  povidone iodine 10% Solution 1 Application(s) Topical two times a day  senna 2 Tablet(s) Oral at bedtime    MEDICATIONS  (PRN):  acetaminophen     Tablet .. 650 milliGRAM(s) Oral every 6 hours PRN Temp greater or equal to 38C (100.4F), Mild Pain (1 - 3)  benzocaine/menthol Lozenge 1 Lozenge Oral three times a day PRN Sore Throat  bisacodyl 5 milliGRAM(s) Oral every 12 hours PRN Constipation  HYDROmorphone   Tablet 4 milliGRAM(s) Oral every 4 hours PRN Severe Pain (7 - 10)  oxyCODONE    IR 10 milliGRAM(s) Oral every 4 hours PRN Moderate Pain (4 - 6)  sodium chloride 0.9% lock flush 10 milliLiter(s) IV Push every 1 hour PRN Pre/post blood products, medications, blood draw, and to maintain line patency    Vital Signs Last 24 Hrs  T(C): 36.1 (04 Apr 2025 08:51), Max: 36.9 (03 Apr 2025 17:51)  T(F): 97 (04 Apr 2025 08:51), Max: 98.4 (03 Apr 2025 17:51)  HR: 84 (04 Apr 2025 08:51) (81 - 94)  BP: 107/55 (04 Apr 2025 08:51) (90/54 - 111/67)  BP(mean): --  RR: 18 (04 Apr 2025 08:51) (18 - 18)  SpO2: 95% (04 Apr 2025 08:51) (94% - 97%)    Parameters below as of 04 Apr 2025 08:51  Patient On (Oxygen Delivery Method): room air        I&O's Summary    03 Apr 2025 07:01  -  04 Apr 2025 07:00  --------------------------------------------------------  IN: 0 mL / OUT: 200 mL / NET: -200 mL          Physical Exam:   GENERAL: NAD, well-groomed, well-developed  HEENT: VINNY/   Atraumatic, Normocephalic  ENMT: No tonsillar erythema, exudates, or enlargement; Moist mucous membranes, Good dentition, No lesions  NECK: Supple, No JVD, Normal thyroid  CHEST/LUNG: grossly clear   CVS: Regular rate and rhythm; No murmurs, rubs, or gallops  GI: : Soft, Nontender, Nondistended; Bowel sounds present  NERVOUS SYSTEM:  Alert & Oriented X3  EXTREMITIES:  LE wounds   LYMPH: No lymphadenopathy noted  SKIN: No rashes or lesions  ENDOCRINOLOGY: No Thyromegaly  PSYCH: Appropriate    Labs:                              8.8    6.14  )-----------( 240      ( 01 Apr 2025 08:37 )             30.0     04-01    133[L]  |  97  |  28[H]  ----------------------------<  74  4.3   |  23  |  2.87[H]        Studies  < from: CT Abdomen and Pelvis w/ IV Cont (03.25.25 @ 13:43) >    ACC: [id number] EXAM:  CT CHEST IC   ORDERED BY:  NELL TOLBERT     ACC: [id number] EXAM:  CT ABDOMEN AND PELVIS IC   ORDERED BY:  NELL TOLBERT     PROCEDURE DATE:  03/25/2025          INTERPRETATION:  CLINICAL INFORMATION: Evaluate for pneumonia. Abdominal   pain.    COMPARISON: CT chest 2/23/2025. CT abdomen and pelvis 2/20/2025.    CONTRAST/COMPLICATIONS:  IV Contrast: Omnipaque 350  90 cc administered   10 cc discarded  Oral Contrast: NONE    PROCEDURE:  CT of the Chest, Abdomen and Pelvis was performed.  Sagittal and coronal reformats were performed.    FINDINGS:  CHEST:  LUNGS AND LARGE AIRWAYS: Patent central airways. Small amount of debris   within the trachea. Right lower lobe consolidative opacities. Additional   consolidative opacities in the left lower lobe, possibly atelectasis. A   few ill-defined nodular opacities in the left lower lobe measuring up to   6 mm.  PLEURA: Small left pleural effusion.  VESSELS: Right IJ approach catheter with tip in the superior cavoatrial   junction. Linear nonocclusive filling defect within the right internal   jugular vein (3:1). Atherosclerotic changes. Coronary artery   calcification.  HEART: Heart size is normal. Aortic valve and mitral annular   calcification. No pericardialeffusion.  MEDIASTINUM AND MARYANNE: No lymphadenopathy.  CHEST WALL AND LOWER NECK: Within normal limits.    ABDOMEN AND PELVIS:  LIVER: Subcentimeter hypodensity in the left lobe that is too small to   characterize.  BILE DUCTS: Normal caliber.  GALLBLADDER: Cholelithiasis.  SPLEEN: Within normal limits.  PANCREAS: Within normal limits.  ADRENALS: Within normal limits.  KIDNEYS/URETERS: Bilateral mild hydroureteronephrosis to the level of the   urinary bladder without evidence of obstructive urolithiasis, not   significantly changed from the prior exam of 2/20/2025. Diffuse   urothelial thickening of the bilateral ureters and collecting systems,   also similar to the previous CT. An exophytic hypodense right renal   lesion measuring 4.1 cm is unchanged.    BLADDER: Underdistended with a suprapubic catheter in place.  REPRODUCTIVE ORGANS: Enlarged prostate. Linear densities in the prostate,   possibly from previous UroLift procedure; correlate with the patient's   surgical history.    BOWEL:Rectal wall thickening. Sigmoid anastomosis. No evidence for bowel   obstruction. Normal appendix.  PERITONEUM/RETROPERITONEUM: Within normal limits.  VESSELS: Atherosclerotic changes. Bilateral common iliac/external iliac   artery stents. Previouslydescribed bilateral external iliac and   bilateral superficial femoral artery occlusions seen on the prior CTA is   not well assessed by this exam.  LYMPH NODES: Small left para-aortic lymph node measuring 1.1 x 0.9 cm,   unchanged.  ABDOMINAL WALL: Suprapubic catheter. Subcutaneous gas and subcutaneous   infiltration tracking through the right medial buttock. No discrete   associated collection within the field-of-view..  BONES: Degenerative changes. Bilateral femoral fixation hardware.    IMPRESSION:    CHEST:  *  Right lower lobe consolidative opacities, which may represent   pneumonia. Additional consolidative opacities in the left lower lobe,   potentially atelectasis or infection. A few ill-defined nodular opacities   in the left lower lobe measuring up to 6 mm may also be   infectious/inflammatory in etiology. Suggest continued attention on   follow-up imaging.  *  Small amount of debris in the trachea.  *  Small left pleural effusion.  *  Linear nonocclusive filling defect within the right internal jugular   vein, which may represent residual fibrin sheath or nonocclusive thrombus.    ABDOMEN AND PELVIS:  *  Subcutaneous gas and infiltration tracking along the medial right   buttock, potentially related to a decubitus wound with gas tracking from   the external environment. A necrotizing airforming infection is also a   possibility. Correlate with physical exam.  *  Rectal wall thickening, suggestive of proctitis.  *  Bilateral mild hydroureteronephrosis to the level of the urinary   bladder without evidence of obstructive urolithiasis, and not   significantly changed since 2/20/2025.  *  Diffuse urothelial thickening of the bilateral renal collecting   systems and ureters, which may be seen with an ascending urinary tract   infection or inflammation, similar to the previous CT 2/20/2025.    Findings were discussed with Dr. NELL TOLBERT 3/25/2025 4:30 PM by   Dr. Calzada.    --- End of Report ---            < end of copied text >

## 2025-04-04 NOTE — PROGRESS NOTE ADULT - SUBJECTIVE AND OBJECTIVE BOX
Patient is a 75y old  Male who presents with a chief complaint of ESRD requiring HD, uremia (04 Apr 2025 12:38)      SUBJECTIVE / OVERNIGHT EVENTS: dr dugan from wound care had an extensive conversation w the ptn and daughter yani on the phone at bedside. ptn needs general surgery consult for OR debridement of necrotic tissue of the sacral decubiti and diverting colostomy. ptn did not tolerate debridement at the bedside.     MEDICATIONS  (STANDING):  albuterol/ipratropium for Nebulization 3 milliLiter(s) Nebulizer every 6 hours  aspirin  chewable 81 milliGRAM(s) Oral daily  atorvastatin 40 milliGRAM(s) Oral at bedtime  chlorhexidine 2% Cloths 1 Application(s) Topical daily  clopidogrel Tablet 75 milliGRAM(s) Oral daily  Dakins Solution - 1/4 Strength 1 Application(s) Topical every 8 hours  epoetin aleah-epbx (RETACRIT) Injectable 59924 Unit(s) IV Push <User Schedule>  heparin   Injectable 5000 Unit(s) SubCutaneous every 8 hours  hydrOXYzine hydrochloride 25 milliGRAM(s) Oral three times a day  ibuprofen  Tablet. 400 milliGRAM(s) Oral every 12 hours  lactobacillus acidophilus 1 Tablet(s) Oral two times a day with meals  mupirocin 2% Ointment 1 Application(s) Topical <User Schedule>  Nephro-margaret 1 Tablet(s) Oral daily  oxyCODONE  ER Tablet 30 milliGRAM(s) Oral every 12 hours  pantoprazole    Tablet 40 milliGRAM(s) Oral before breakfast  polyethylene glycol 3350 17 Gram(s) Oral daily  povidone iodine 10% Solution 1 Application(s) Topical two times a day  senna 2 Tablet(s) Oral at bedtime    MEDICATIONS  (PRN):  acetaminophen     Tablet .. 650 milliGRAM(s) Oral every 6 hours PRN Temp greater or equal to 38C (100.4F), Mild Pain (1 - 3)  benzocaine/menthol Lozenge 1 Lozenge Oral three times a day PRN Sore Throat  bisacodyl 5 milliGRAM(s) Oral every 12 hours PRN Constipation  HYDROmorphone   Tablet 4 milliGRAM(s) Oral every 4 hours PRN Severe Pain (7 - 10)  oxyCODONE    IR 10 milliGRAM(s) Oral every 4 hours PRN Moderate Pain (4 - 6)  sodium chloride 0.9% lock flush 10 milliLiter(s) IV Push every 1 hour PRN Pre/post blood products, medications, blood draw, and to maintain line patency      Vital Signs Last 24 Hrs  T(F): 97 (04-04-25 @ 11:51), Max: 98.4 (04-03-25 @ 17:51)  HR: 87 (04-04-25 @ 11:51) (81 - 94)  BP: 102/53 (04-04-25 @ 11:51) (90/54 - 111/67)  RR: 18 (04-04-25 @ 11:51) (18 - 18)  SpO2: 92% (04-04-25 @ 11:51) (92% - 97%)  Telemetry:   CAPILLARY BLOOD GLUCOSE        I&O's Summary    03 Apr 2025 07:01  -  04 Apr 2025 07:00  --------------------------------------------------------  IN: 0 mL / OUT: 200 mL / NET: -200 mL    04 Apr 2025 07:01  -  04 Apr 2025 15:10  --------------------------------------------------------  IN: 0 mL / OUT: 500 mL / NET: -500 mL        PHYSICAL EXAM:  GENERAL: NAD, well-developed  HEAD:  Atraumatic, Normocephalic  EYES: EOMI, PERRLA, conjunctiva and sclera clear  NECK: Supple, No JVD  CHEST/LUNG: Clear to auscultation bilaterally; No wheeze  HEART: Regular rate and rhythm; No murmurs, rubs, or gallops  ABDOMEN: Soft, Nontender, Nondistended; Bowel sounds present  EXTREMITIES:  2+ Peripheral Pulses, No clubbing, cyanosis, or edema  PSYCH: AAOx3  NEUROLOGY: non-focal  SKIN: No rashes or lesions    LABS:                    RADIOLOGY & ADDITIONAL TESTS:    Imaging Personally Reviewed:    Consultant(s) Notes Reviewed:      Care Discussed with Consultants/Other Providers:

## 2025-04-04 NOTE — PROGRESS NOTE ADULT - SUBJECTIVE AND OBJECTIVE BOX
Subjective: Patient seen and examined. No new events except as noted.     REVIEW OF SYSTEMS:    CONSTITUTIONAL:+ weakness, fevers or chills  EYES/ENT: No visual changes;  No vertigo or throat pain   NECK: No pain or stiffness  RESPIRATORY: No cough, wheezing, hemoptysis; No shortness of breath  CARDIOVASCULAR: No chest pain or palpitations  GASTROINTESTINAL: No abdominal or epigastric pain. No nausea, vomiting, or hematemesis; No diarrhea or constipation. No melena or hematochezia.  GENITOURINARY: No dysuria, frequency or hematuria  NEUROLOGICAL: No numbness or weakness  SKIN: No itching, burning, rashes, or lesions   All other review of systems is negative unless indicated above.    MEDICATIONS:  MEDICATIONS  (STANDING):  albuterol/ipratropium for Nebulization 3 milliLiter(s) Nebulizer every 6 hours  aspirin  chewable 81 milliGRAM(s) Oral daily  atorvastatin 40 milliGRAM(s) Oral at bedtime  chlorhexidine 2% Cloths 1 Application(s) Topical daily  clopidogrel Tablet 75 milliGRAM(s) Oral daily  Dakins Solution - 1/4 Strength 1 Application(s) Topical every 8 hours  epoetin aleah-epbx (RETACRIT) Injectable 23694 Unit(s) IV Push <User Schedule>  heparin   Injectable 5000 Unit(s) SubCutaneous every 8 hours  hydrOXYzine hydrochloride 25 milliGRAM(s) Oral three times a day  ibuprofen  Tablet. 400 milliGRAM(s) Oral every 12 hours  lactobacillus acidophilus 1 Tablet(s) Oral two times a day with meals  mupirocin 2% Ointment 1 Application(s) Topical <User Schedule>  Nephro-margaret 1 Tablet(s) Oral daily  oxyCODONE  ER Tablet 30 milliGRAM(s) Oral every 12 hours  pantoprazole    Tablet 40 milliGRAM(s) Oral before breakfast  polyethylene glycol 3350 17 Gram(s) Oral daily  povidone iodine 10% Solution 1 Application(s) Topical two times a day  senna 2 Tablet(s) Oral at bedtime      PHYSICAL EXAM:  T(C): 36.1 (04-04-25 @ 08:51), Max: 36.9 (04-03-25 @ 17:51)  HR: 84 (04-04-25 @ 08:51) (81 - 94)  BP: 107/55 (04-04-25 @ 08:51) (90/54 - 111/67)  RR: 18 (04-04-25 @ 08:51) (18 - 18)  SpO2: 95% (04-04-25 @ 08:51) (94% - 97%)  Wt(kg): --  I&O's Summary    03 Apr 2025 07:01  -  04 Apr 2025 07:00  --------------------------------------------------------  IN: 0 mL / OUT: 200 mL / NET: -200 mL              Appearance: NAD  HEENT:  Dry oral mucosa, PERRL, EOMI	  Lymphatic: No lymphadenopathy  Cardiovascular: Normal S1 S2, No JVD, No murmurs, No edema  Respiratory: Lungs clear to auscultation	  Psychiatry: A & O x 3, Mood & affect appropriate  Skin: No rashes, No ecchymoses, No cyanosis	  Neurologic: Non-focal  GI/Abd: Soft, obese, nontender. Dressing to Q C/D/I. Suprapubic catheter in place  Ext: Palp femoral pulses bilat. BLE atrophic, minimal dorsi/plantarflexion bilaterally. Sensation grossly intact. LLE edematous compared to R, erythema to right toes/forefoot. mottling of right toes/forefoot/heel  LLE:  small superficial abrasion, +edema and +ecchymosis over L knee  +TTP over L knee, no TTP along remainder of extremity; compartments soft  Limited ROM at knee 2/2 pain  No gross varus/valgus laxity, but assessment limited 2/2 pain  Motor: TA/EHL/GS/FHL intact  Sensory: DP/SP/Tib/Jordan/Saph SILT  +DP pulse (symmetric relative to contralateral side), WWP   pressure wound from the LLE immobilizer posteriorly proximal to the knee.  Vascular: Peripheral pulses palpable 2+ bilaterally        LABS:    CARDIAC MARKERS:                  proBNP:   Lipid Profile:   HgA1c:   TSH:             TELEMETRY: 	    ECG:  	  RADIOLOGY:   DIAGNOSTIC TESTING:  [ ] Echocardiogram:  [ ]  Catheterization:  [ ] Stress Test:    OTHER:

## 2025-04-04 NOTE — PROGRESS NOTE ADULT - SUBJECTIVE AND OBJECTIVE BOX
Wound Care Follow Up Note:    Patient seen with wound team today regarding 74 year old man with history of multiple comorbidities including prior sacral injury, hypertension, polio, atrial fibrillation, generalized edema, scoliosis, Diabetes Mellitus Type 2, peripheral arterial disease with chronic limb threatened ischemia, and chronic pain with multiple sequela including LE weakness requiring wheelchair attributed to polio since 18 months of age, scoliosis, neurogenic bladder s/p suprapubic catheter, and colostomy s/p iatrogenic rectal rupture injury, reversal of colostomy, anemia, erectile dysfunction, multiple fractures including patella and femoral condyle with gianluca placement of the lower extremities, deep venous thrombosis, elevated hemidiaphragm, hyperlipidemia, bacterial sepsis, electrolyte imbalance, shoulder surgery, cataract surgery, tremors, and renal failure presenting to the ED after falling from his wheelchair. Patient declined vaccination offered.    Patient prior to hospitalization refused hemodialysis. Admitted for initiation for emergent life-saving hemodialysis. Vascular surgery consulted for hemodialysis access with emergent Shiley catheter placement. Patient reports he has had kidney problems for many years but decided to defer hemodialysis. He states that he has had prior sacral injury with history of chronic pain to the back. Strongly emphasizes he would not want anything in his legs stating that “I have had over 50 surgeries of my legs”. Patient was noted to have mottling of the feet and legs upon hospital admission. Patient was found to have an evolving sacral deep tissue injury noted within the CMS window of 72 hours of hospital admission. Patient placed on low air loss pressure relief mattress upon hospital admission.    He was seen by orthopedics and noted by CT scan to have an acute knee fracture with left knee immobilizer placed.  [facility] and PVR performed on 2/18/25 demonstrating monophasic waveforms. On  2/20/25, patient had CT Angiogram demonstrating significant arterial insufficiency of the external and internal iliac arteries providing vascular flow to the sacral area and lower extremities. Patient was also seen from the wound team; however, patient refused examination 2/20/25. Patient being followed by pulmonary and infectious disease. Follow up wound care consult on 2/26/25 noted pain to bilateral heels with bullae and unstageable deep tissue injury to the right buttock area. Patient refused Z-celso offloading boots. Celso-lock suggested to help alleviate heel pressure; however, patient declined. Dietary following patient. Patient has been turned and positioned by nursing staff.     Patient followed up with wound team with knee immobilizer noted to have device related wounds being managed by his wife as per patient preference. s/p excisional debulking of necrotic tissue. Ischial, buttock extension to sacral area wounds due to evolving deep tissue injury being treated with appropriate dressings. On 3/3/25, patient underwent kissing balloon stents to both iliac arteries to increase inflow to the lower extremities.  Subsequent improvement of the lower extremities as described by the patient; however, it was discussed with the patient and family as per Dr. Swenson the poor prognosis as the patient is not a candidate for further revascularization and is at risk for limb loss. Patient was followed up with wound team and podiatry.      Called to evaluate patient today regarding patient follow up. Patient seen in hemodialysis laying on his side with the assistance of a Celso-lock offloading pillow. Patient believes that he had a large scab that was inappropriately debrided and declined excisional debridement. He states that “if I were just left alone, it would heel by itself”. Patient states that his wife placed iodine on his toes. Patient could not be examined.    Patient showed me a picture of the wound on his phone. Discussed with the patient and daughter who is an , Cori, on the phone that the patient does not have a scab but necrotic dead tissue that needs to be debrided.  I described in detail to the patient and Cori why this is not a scab and instead dead tissue. I also discussed the risks of not debriding the tissue such as infection, sepsis, and death. Discussed that the sacral wound could take a prolonged period of time to heal. I also discussed with the patient and Cori that the patient has gangrene of his feet. I explained that this was not scabs and that he is at risk for multiple complications including but not limited to amputation. Patient refused to offload the wounds with boots. Patient's legs seen on pillows today. I discussed with the patient and family that he has a poor prognosis of limb salvage and life expectancy. Continue current wound care as recommended.    He agreed to possible excisional debridement with general surgery under anesthesia. Discussed with Dr. Swenson who recommends palliative care. Discussed with Dr. Martinez regarding general surgery consultation, CT scan of sacrum, and palliative care.

## 2025-04-04 NOTE — PROGRESS NOTE ADULT - SUBJECTIVE AND OBJECTIVE BOX
NEPHROLOGY     Patient seen and examined resting on room air, completed HD and removed 0.5kg, reports of pain, denies sob, in no acute distress.      MEDICATIONS  (STANDING):  albuterol/ipratropium for Nebulization 3 milliLiter(s) Nebulizer every 6 hours  aspirin  chewable 81 milliGRAM(s) Oral daily  atorvastatin 40 milliGRAM(s) Oral at bedtime  chlorhexidine 2% Cloths 1 Application(s) Topical daily  clopidogrel Tablet 75 milliGRAM(s) Oral daily  Dakins Solution - 1/4 Strength 1 Application(s) Topical every 8 hours  epoetin aleah-epbx (RETACRIT) Injectable 71364 Unit(s) IV Push <User Schedule>  heparin   Injectable 5000 Unit(s) SubCutaneous every 8 hours  hydrOXYzine hydrochloride 25 milliGRAM(s) Oral three times a day  ibuprofen  Tablet. 400 milliGRAM(s) Oral every 12 hours  lactobacillus acidophilus 1 Tablet(s) Oral two times a day with meals  mupirocin 2% Ointment 1 Application(s) Topical <User Schedule>  Nephro-margaret 1 Tablet(s) Oral daily  oxyCODONE  ER Tablet 30 milliGRAM(s) Oral every 12 hours  pantoprazole    Tablet 40 milliGRAM(s) Oral before breakfast  polyethylene glycol 3350 17 Gram(s) Oral daily  povidone iodine 10% Solution 1 Application(s) Topical two times a day  senna 2 Tablet(s) Oral at bedtime    VITALS:  T(C): , Max: 36.9 (04-03-25 @ 17:51)  T(F): , Max: 98.4 (04-03-25 @ 17:51)  HR: 84 (04-04-25 @ 08:51)  BP: 107/55 (04-04-25 @ 08:51)  RR: 18 (04-04-25 @ 08:51)  SpO2: 95% (04-04-25 @ 08:51)    I and O's:    04-03 @ 07:01  -  04-04 @ 07:00  --------------------------------------------------------  IN: 0 mL / OUT: 200 mL / NET: -200 mL    PHYSICAL EXAM:  Constitutional: alert, NAD  HEENT: NCAT, DMM  Neck: Supple, No JVD  Respiratory: CTA-b/l  Cardiovascular: RRR s1s2, no m/r/g  Gastrointestinal: BS+, soft, NT/ND  : (+)suprapubic cath  Extremities: tr b/l LE edema  Neurological: reduced generalized strength  Back: no CVAT b/l  Skin: (+)cyanotic changes b/l feet  Access: RIJ tunneled cath    LABS:  No new labs

## 2025-04-05 NOTE — PROGRESS NOTE ADULT - PROBLEM SELECTOR PLAN 1
CT chest with new RLL consolidation and LLL opacities, tracheal debris  -Suspect aspiration related s/p episode of vomiting  -S/p ABX as per ID  -Continue bronchodilators  -Keep sats >90% with o2 PRN  -Pt with low grade temp 3/30, dry cough. RVP negative, now afebrile. Continue to monitor fever curve.

## 2025-04-05 NOTE — PROGRESS NOTE ADULT - ASSESSMENT
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 The initial plan was that he would present to the [facility] ER Monday 2/17 for HD initiation. However, day of admission, he fell and sustained trauma to his knee. Called to consult for cough, elevated R hemidiaphragm.

## 2025-04-05 NOTE — PROGRESS NOTE ADULT - ASSESSMENT
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 He is well known to me from multiple recent admisions  s/p admissions at [facility] 12/20-12/25/24 and 2/4-2/7 with SONDRA on CKD with associated uremic symptoms.   At the last admission he had expressed strong interest to try to hold off with HD intiation but he has been getting worse clinically at home.  His SONDRA and uremic symptoms significantly improved after the first admission; they improved a to mild extent during the 2nd admission to the point where he was able to be discharged without HD. Since then, however, he has worsened further.   He has been suffering from progressively worsening diffuse pruritus, loss of appetite, and generalized weakness and falls.   His nephrologist and PCP has been speaking with him and his family over the past few days,   The initial plan was that he would present to the [facility] ER Monday 2/17 (ie tomorrow) for HD initiation. Today, however, he fell and sustained trauma to his knee. Given the fall and concern for knee fracture, he presented to the ER today. multiple xrays show no Fractures.      CKD5, uremia, requiring initiation of HD  Rash, seborrheic dermatitis  Pruritis  Anemia  PAD  SP catheter, chronic  Left inferior pole acute on chronic patella Fx    plan  - HD via R subclavian permacath  - 4/5: thorough eval by wound care attending 4/4/25, case d/w wound care attending Dr Dugan. as per her recommendation placed general surgery consult for OR debridement and diverting colostomy. ptn had sacral decubiti prior to admission  -4/4: dr dugan from wound care had an extensive conversation w the ptn and daughter yanique on the phone at bedside. ptn needs general surgery consult for OR debridement of necrotic tissue of the sacral decubiti and diverting colostomy. ptn did not tolerate debridement at the bedside. . completed Abx for PNA  -4/3: ptn's insurance changed to straight medicare/medicaid. now able to go to [facility], but ptn wants to go home. will need equipment set up. this was d/w CM and ACP, daughter Yanique on the phone and wife at bedside today. today is last day of ABx. will transition pain meds to po DIlaudid from IV, will also need outptn pain management F/U, outptn wound care, home PT, need outptn HD set up  -4/2: seen prior to going to HD, no new events. tomorrow completing 10 day course of Meropenem. ongoing need for narcotics 2/2 pain.   -4/1: ptn is frustrated about a prolonged hospitalization but states he feels better overall and once medically cleared he will be ready to go home, not sooner than 4/7. completing ABx on 4/3.   -3/31: at HD today had 1.7 L UF, post HD was hypotense, gave gentle IVF , BP still a bit low. afebrile, loose BMs but not diarrhea, GI PCR not sent, RVP neg. . on Meropenem course for total of 10 days, last day 4/3, would care for decubiti and left knee wound post trauma  -3/30: Tmax 100.9, c/o dry cough and diarrhea but not watery. pain is controlled at the time of visit. wife at bedside. will check RVP panel, GI PCR, sine diarrhea non watery will not order C. Diff. ID to follow  - 3/29: cont meropenem, seen by coverage  - 3/28: pain from decubitus ulcer and Left knee post debridement continues. wound care recs in place. on Meropenem for PNA, pulm and ID following. HD as per renal. HDS  -3/27: ptn is complaining that pain post decubital and left knee debridement has been severe. his daughter is on speaker phone, wife is at bedside. his pain is controlled when meds are administered. he is very uncomfortable due to the pain at the site of decubitus debridement. lyrica helped his pain before but made him drowsy, griffin with gabapentin. he doesnt want them to be resumed. will cont dilaudid, oxycodone, oxycontin, ibuprofen. he is on Meropenem for aspiration PNA/HCAP. [facility] [place]. seen by vascular cardiology to address R IJ post shiley non occlusive DVT. full AC was not recommended. will check rpt doppler in 3-4 weeks.   - 3/26: Wound care performed bedside debridement of Left knee wound 2/2 lifted eschar, and sacral decubitus. findings c/w possible infection and mainly fat necrosis. cont Meropenem. ID following. podiatry following for gangrenous toes. will give additional single dose IV DIlaudid 2/2 severe pain post debridement.    - 3/25: wife at bedside, daughter on speaker phone at bedside. ptn is pain free at the time of visit and states he wants to cont the pain regiment he is presently on. ct C/A/P revealed: RLL PNA, prob aspiration, stercoral proctitis, decubitus ulcer w possible progression to sacral OM, R IJ nonocclusive DVT. these findings d/w ptn ,his family, wound care, ACP, Vascular, Nephrology, ID, pUlm, GI. meropenem initiated, Bactrim stopped. ptn states when he is cleared for DC, he wants to go home , not to [facility], not to SNF. daughter and wife aware.   -3/24: ptn  was seen by GI for N/V, its not clear the etiology, will obtain Ct C/A/P. ptn states he is coughing up green mucus as well  -3/23:  ptn is in good spirits, says he vomited "spit" this am, but tolerated all meals without vomiting. no abd pain, no undigested food in the vomit. afebrile, no diarrhea, RVP neg. GI consulted.   -3/21-22: ptn feels well.  pain is controlled. still no accepting facility for [facility]  -3/20: ptn states he has severe low back and LLE pain though overall is improving.   -3/19: ptn w tan crusting around SPC , states its painful. started on po Bactrim, renally dosed by ID for cellulitis.   -3/18: LLE paraesthesias are chronic, will d/w CM re dc planning to [facility]    -3/17: ptn states he is lethargic, he has LLE numbness which is new and severe pain at SPC insertion site. this was ID, d/w neuro, renal and vascular. as per ID: no sign of an acute infection recommend CT A/P.   -3/16:  urine cx from 3/15 NTD, zosyn DCed, awaiting dc to [facility], not sure will be able to go to [facility] since there is an insurance coverage conflict. HD as per renal  - 3/15: spoke w ptn's daughter today re denial from [facility] for [facility]. ptn has [employer] medicare advantage plan, which [facility] doesnt accept. i recommended to speak  to Cm and to the insurance company to find participating facilities. ptn is stable today. pain and erythema at SP catheter site has resolved today. seen by ID, will cont ZOSYN for now. UCx sent.   - 3/14: suprapubic tube changhed by , ptn has crusting at the insertion site and pain. will start Abx, urine always has sediment, will sent for cx, start Vanco/ZOsyn. ID consult called. check MRSA/MSSA PCR  -3/13: ptn is doing well. ptn has an accepting rehab facility, now pending AUTH.   -3/12: ptn is no longer constipated. doing well off prn IV DIlaudid. awaiting dc to [facility]  -3/11: ptn has no new c/o other than feeling constipated, lactulose ordered. also in preparation for [facility] will dc prn IV DIlaudid, cont prn OXy IR  -3/10:  ptn is awake, alert, wife at bedside, i instructed them to give rehab choices. also awaiting SPC change to be done by urology. pain is controlled  -3/9: seen by PT, will refer to [facility]. choices to be given in AM. this was d/w ptn, daughter, wife.   -3/8:  ptn didnt want to get PT eval done, will reorder. ptn needs [facility] placement. PMNR consult ordered  - 3/7: ptn is alert , pain is controlled, DC planning d/w ptn and HCP, daughter Yanique  -3/6:  ptn is awake, alert, ecchymotic feet look improved, pain is controlled. DC planning to [facility]  3/4-5 - s/p b/l iliac arteries angioplasty and stent placements on 3/3. today has new ecchymoses in b/l LE, concern for arterial insufficiency. vascular aware.. wound from Left knee immobilizer is dressed and healing. pain is controlled.   LEFT UE AVF placement/scheduling will be on outptn basis as per vascular. dc planning to [facility]  - 3/3: ptn is s/p angiogram RLE : b/l iliac arteries w successful angioplasty and stents. in PACU. awaitng HD.  ptn has a pressure wound from the LLE immobilizer posteriorly proximal to the knee. its been on 2/2 knee fracture, applied by ortho and managed by ptn's wife as per ptn's and wife's request , this was d/w nursing and nurse manager ms Ruiz.. immobilizer should be managed by orthopedics and nursing. will keep it off, only keep on when repositioning for care and then remove. wound care to F/U . this was discussed at length w daughter Yanique and wife Dee Dee.   -3/1-3/2: ptn is smiling, pain free, had HD 2/28 and 3/1, awaiting RLE angiogram 3/3, on Heparin drip, euvolemic  -2/28: ptn is lethargic, awaiting RLE angiogram possible fem-fem bypass hopefully on 3/3 pending OR availability, awaiting HD today    -2/27:  plan for angiogram in am with possible angioplasty, possible stent, possible fem-fem bypass. medically cleared for angiogram and possible surgery, stent, angioplasty. pain is controlled. remains on heparin drip as per vascular. HD as per renal    -2/26:  ptn is sleeping, pain is controlled, he was seen by wound care and vascular attending. planning on angiogram 2/2 severe PAD in LE on CTA. he also spoke to HCP. ptn and HCP in agreement. ptn was started on HEPARIN drip as per vascular recs. RIJ permacath done 2/25    - 2/25: ptn states he is hallucinating, but not at present, his MS is at baseline, his pain is controlled, he still needs prn pain meds when he is moved in the bed or for testing or for HD. awaiting Permacath, AVF creation, LE bypass to be d/w Dr. Swenson and the ptn tomorrow. ptn has cardiology clearance  if he opts for. Ptn has sacral decubiti and posterior thigh and buttock decibiti and b/l heel decubiti. Z flow fabienne d/w RN, get wound care consult    -2/24: pain is controlled  if the LLE is immobile and fully extended. doesn't tolerate the knee immobilizer. has a medium condyle and acute on chronic patella fx in LEFT knee. as per vascular ptn will need iliac stent  w a fem-fem bypass, possible axillo-femoral bypass. for this surgery he would need cardiac work up . more pressing surgery is LUE AVF creation,  in IR will arrange for Permacath. for pain control: Motrin 400 mg q12H, Oxy ER 30 mg q12H, Oxy IR 10 for mod pain and dilaudid 2 mg iv for severe pain. still has pruritis though improved, will raise atarax 25 mg to tid. plan of care d/w daughter Norma Persaud , ptn and his wife. Also d/w renal, card, vascular, ACP    -2/23: Daughter Yanique is the HCP, she and the ptn filled out the paperwork. daily plan and findings d/w her and the ptn. MS is at abseline, c/o b/l LE foot pain and Left Knee pain. xrays of left knee: cannot r/o acute on chronic inferior pole patellar Fx. knee immobilizer is on, awaiting ortho consult. doubt needs any intervention awaiting Ct chest today, will add CT Left knee. ptn states itching has recurred, severe pain has recurred. will resume Atatrax ( 25 mg bid) and Oxycodone ER , but at a lower dose 15 mg q12H. cont prn analgesics. HD as per renal, awaiting Vascular consult f/u re CTA A/L &LE and recs. ptn also wants AVF surgery on this admission. Coughing has stopped    - 2/22: ptn is drowsy but arousable, calmer today, answers questions appropriately. he is pain free, he stopped coughing, he denies having pruritis: will DC:  OXY ER, Atarax, Tessalon perles.     -  2/21: ptn is tearful, a bit confused, coughing, recognizes me and family at bedside. GOC d/w daughter and wife. AMS prob delirium 2/2 acute illness +/- opiods, lyrica, atarac. will lower atarak to tid, dc lyrica, cont Oxy 2/2 ptn has severe LE pain. neuro called for eval. Head ct done yesterday w no acute findings. CTA A/P w LE run fof: severe PAD, vascular to follow up on plan fo care. plan for HD tomorrow and next week place on tiw schedule of MWF. will need tunneled catheter prior to DC and will d/w vascular scheduling of AVF. ptn wants all to be done while inptn. daughter wants to be HCP as per ptn's wishes. she was given paperwork to get it signed and witnessed and will present to nursing thereafter. details of findings and complaints and plan of care d/w daughter and wife. spent 60 min. RVP ordered. start mucinex , tessalon perles, duonebs, get CT chest to r/o PNA. pulm called    -  2/20:  ptn had RRT 2/2 AMS and tremors. no SZ, no LOC. noted to have Na 123( hyponatremia), given 500 cc NS. Gabapentin DCed. ptn had been taking gabapentin at home PTA. Pain is controlled. Pruritis resolved, tolerating HD otherwise.     - Pain management:  cont Oxycodone 10 IR q6H,  DIlaudid prn severe pain 2 mg q4H prn.   - cont outptn meds  - DVT ppx w [facility]SC

## 2025-04-05 NOTE — PROGRESS NOTE ADULT - SUBJECTIVE AND OBJECTIVE BOX
INTERVAL HPI/OVERNIGHT EVENTS:     no events overnight       acetaminophen     Tablet .. 650 milliGRAM(s) Oral every 6 hours PRN  albuterol/ipratropium for Nebulization 3 milliLiter(s) Nebulizer every 6 hours  aspirin  chewable 81 milliGRAM(s) Oral daily  atorvastatin 40 milliGRAM(s) Oral at bedtime  benzocaine/menthol Lozenge 1 Lozenge Oral three times a day PRN  bisacodyl 5 milliGRAM(s) Oral every 12 hours PRN  chlorhexidine 2% Cloths 1 Application(s) Topical daily  clopidogrel Tablet 75 milliGRAM(s) Oral daily  Dakins Solution - 1/4 Strength 1 Application(s) Topical every 8 hours  epoetin aleah-epbx (RETACRIT) Injectable 70444 Unit(s) IV Push <User Schedule>  heparin   Injectable 5000 Unit(s) SubCutaneous every 8 hours  HYDROmorphone   Tablet 4 milliGRAM(s) Oral every 4 hours PRN  hydrOXYzine hydrochloride 25 milliGRAM(s) Oral three times a day  ibuprofen  Tablet. 400 milliGRAM(s) Oral every 12 hours  lactobacillus acidophilus 1 Tablet(s) Oral two times a day with meals  mupirocin 2% Ointment 1 Application(s) Topical <User Schedule>  Nephro-margaret 1 Tablet(s) Oral daily  oxyCODONE    IR 10 milliGRAM(s) Oral every 4 hours PRN  oxyCODONE  ER Tablet 30 milliGRAM(s) Oral every 12 hours  pantoprazole    Tablet 40 milliGRAM(s) Oral before breakfast  polyethylene glycol 3350 17 Gram(s) Oral daily  povidone iodine 10% Solution 1 Application(s) Topical two times a day  senna 2 Tablet(s) Oral at bedtime  sodium chloride 0.9% lock flush 10 milliLiter(s) IV Push every 1 hour PRN          Hemoglobin: 8.8 g/dL (04-01 @ 08:37)            Creatinine Trend: 2.87<--, 3.63<--, 2.52<--, 3.21<--, 4.04<--, 3.43<--    COAGS:           T(C): 36.9 (04-05-25 @ 00:29), Max: 36.9 (04-05-25 @ 00:29)  HR: 75 (04-05-25 @ 00:29) (60 - 87)  BP: 96/60 (04-05-25 @ 00:29) (96/60 - 104/70)  RR: 18 (04-05-25 @ 00:29) (18 - 18)  SpO2: 96% (04-05-25 @ 00:29) (92% - 96%)  Wt(kg): --    I&O's Summary    04 Apr 2025 07:01  -  05 Apr 2025 07:00  --------------------------------------------------------  IN: 480 mL / OUT: 500 mL / NET: -20 mL        Allergies    No Known Allergies    Intolerances        Review of Systems:    General:  No wt loss, fevers, chills, night sweats, fatigue   Eyes:  Good vision, no reported pain  ENT:  No sore throat, pain, runny nose, dysphagia  CV:  No pain, palpitations, hypo/hypertension  Resp:  No dyspnea, cough, tachypnea, wheezing  GI:  No pain, No nausea, No vomiting, No diarrhea, No constipation, No weight loss, No fever, No pruritis, No rectal bleeding, No melena, No dysphagia  :  No pain, bleeding, incontinence, nocturia  Muscle:  No pain, weakness  Neuro:  No weakness, tingling, memory problems  Psych:  No fatigue, insomnia, mood problems, depression  Endocrine:  No polyuria, polydypsia, cold/heat intolerance  Heme:  No petechiae, ecchymosis, easy bruisability  Skin:  No rash, tattoos, scars, edema         PHYSICAL EXAM:    Constitutional: NAD  HEENT: EOMI, throat clear  Neck: No LAD, supple  Respiratory: CTA and P  Cardiovascular: S1 and S2, RRR, no M  Gastrointestinal: BS+, soft, NT/ND, neg HSM,  Extremities: No peripheral edema, neg clubbing, cyanosis  Vascular: 2+ peripheral pulses  Neurological: A/O   Psychiatric: Normal mood, normal affect  Skin: No rashes

## 2025-04-05 NOTE — PROGRESS NOTE ADULT - SUBJECTIVE AND OBJECTIVE BOX
Patient is a 75y old  Male who presents with a chief complaint of ESRD requiring HD, uremia (05 Apr 2025 10:34)      SUBJECTIVE / OVERNIGHT EVENTS: thorough eval by wound care attending 4/4/25, case d/w wound care attending Dr Pulido. as per her recommendation placed general surgery consult for OR debridement and diverting colostomy. ptn had sacral decubiti prior to admission    MEDICATIONS  (STANDING):  albuterol/ipratropium for Nebulization 3 milliLiter(s) Nebulizer every 6 hours  aspirin  chewable 81 milliGRAM(s) Oral daily  atorvastatin 40 milliGRAM(s) Oral at bedtime  chlorhexidine 2% Cloths 1 Application(s) Topical daily  clopidogrel Tablet 75 milliGRAM(s) Oral daily  Dakins Solution - 1/4 Strength 1 Application(s) Topical every 8 hours  epoetin aleah-epbx (RETACRIT) Injectable 31407 Unit(s) IV Push <User Schedule>  heparin   Injectable 5000 Unit(s) SubCutaneous every 8 hours  hydrOXYzine hydrochloride 25 milliGRAM(s) Oral three times a day  ibuprofen  Tablet. 400 milliGRAM(s) Oral every 12 hours  lactobacillus acidophilus 1 Tablet(s) Oral two times a day with meals  mupirocin 2% Ointment 1 Application(s) Topical <User Schedule>  Nephro-margaret 1 Tablet(s) Oral daily  oxyCODONE  ER Tablet 30 milliGRAM(s) Oral every 12 hours  pantoprazole    Tablet 40 milliGRAM(s) Oral before breakfast  polyethylene glycol 3350 17 Gram(s) Oral daily  povidone iodine 10% Solution 1 Application(s) Topical two times a day  senna 2 Tablet(s) Oral at bedtime    MEDICATIONS  (PRN):  acetaminophen     Tablet .. 650 milliGRAM(s) Oral every 6 hours PRN Temp greater or equal to 38C (100.4F), Mild Pain (1 - 3)  benzocaine/menthol Lozenge 1 Lozenge Oral three times a day PRN Sore Throat  bisacodyl 5 milliGRAM(s) Oral every 12 hours PRN Constipation  HYDROmorphone   Tablet 4 milliGRAM(s) Oral every 4 hours PRN Severe Pain (7 - 10)  oxyCODONE    IR 10 milliGRAM(s) Oral every 4 hours PRN Moderate Pain (4 - 6)  sodium chloride 0.9% lock flush 10 milliLiter(s) IV Push every 1 hour PRN Pre/post blood products, medications, blood draw, and to maintain line patency      Vital Signs Last 24 Hrs  T(F): 97.7 (04-05-25 @ 10:36), Max: 98.4 (04-05-25 @ 00:29)  HR: 87 (04-05-25 @ 10:36) (60 - 87)  BP: 114/70 (04-05-25 @ 10:36) (96/60 - 114/70)  RR: 18 (04-05-25 @ 10:36) (18 - 18)  SpO2: 99% (04-05-25 @ 10:36) (93% - 99%)  Telemetry:   CAPILLARY BLOOD GLUCOSE        I&O's Summary    04 Apr 2025 07:01  -  05 Apr 2025 07:00  --------------------------------------------------------  IN: 480 mL / OUT: 500 mL / NET: -20 mL        PHYSICAL EXAM:  GENERAL: NAD, well-developed  HEAD:  Atraumatic, Normocephalic  EYES: EOMI, PERRLA, conjunctiva and sclera clear  NECK: Supple, No JVD  CHEST/LUNG: Clear to auscultation bilaterally; No wheeze  HEART: Regular rate and rhythm; No murmurs, rubs, or gallops  ABDOMEN: Soft, Nontender, Nondistended; Bowel sounds present  EXTREMITIES:  2+ Peripheral Pulses, No clubbing, cyanosis, or edema  PSYCH: AAOx3  NEUROLOGY: non-focal  SKIN: No rashes or lesions    LABS:                        9.3    6.30  )-----------( 258      ( 05 Apr 2025 11:25 )             31.3     04-05    137  |  101  |  25[H]  ----------------------------<  68[L]  4.3   |  25  |  2.83[H]    Ca    7.5[L]      05 Apr 2025 11:25            Urinalysis Basic - ( 05 Apr 2025 11:25 )    Color: x / Appearance: x / SG: x / pH: x  Gluc: 68 mg/dL / Ketone: x  / Bili: x / Urobili: x   Blood: x / Protein: x / Nitrite: x   Leuk Esterase: x / RBC: x / WBC x   Sq Epi: x / Non Sq Epi: x / Bacteria: x        RADIOLOGY & ADDITIONAL TESTS:    Imaging Personally Reviewed:    Consultant(s) Notes Reviewed:      Care Discussed with Consultants/Other Providers:

## 2025-04-05 NOTE — PROGRESS NOTE ADULT - SUBJECTIVE AND OBJECTIVE BOX
Subjective: Patient seen and examined. No new events except as noted.     REVIEW OF SYSTEMS:    CONSTITUTIONAL: No weakness, fevers or chills  EYES/ENT: No visual changes;  No vertigo or throat pain   NECK: No pain or stiffness  RESPIRATORY: No cough, wheezing, hemoptysis; No shortness of breath  CARDIOVASCULAR: No chest pain or palpitations  GASTROINTESTINAL: No abdominal or epigastric pain. No nausea, vomiting, or hematemesis; No diarrhea or constipation. No melena or hematochezia.  GENITOURINARY: No dysuria, frequency or hematuria  NEUROLOGICAL: No numbness or weakness  SKIN: No itching, burning, rashes, or lesions   All other review of systems is negative unless indicated above.    MEDICATIONS:  MEDICATIONS  (STANDING):  albuterol/ipratropium for Nebulization 3 milliLiter(s) Nebulizer every 6 hours  aspirin  chewable 81 milliGRAM(s) Oral daily  atorvastatin 40 milliGRAM(s) Oral at bedtime  chlorhexidine 2% Cloths 1 Application(s) Topical daily  clopidogrel Tablet 75 milliGRAM(s) Oral daily  Dakins Solution - 1/4 Strength 1 Application(s) Topical every 8 hours  epoetin aleah-epbx (RETACRIT) Injectable 30006 Unit(s) IV Push <User Schedule>  heparin   Injectable 5000 Unit(s) SubCutaneous every 8 hours  hydrOXYzine hydrochloride 25 milliGRAM(s) Oral three times a day  ibuprofen  Tablet. 400 milliGRAM(s) Oral every 12 hours  lactobacillus acidophilus 1 Tablet(s) Oral two times a day with meals  mupirocin 2% Ointment 1 Application(s) Topical <User Schedule>  Nephro-margaret 1 Tablet(s) Oral daily  oxyCODONE  ER Tablet 30 milliGRAM(s) Oral every 12 hours  pantoprazole    Tablet 40 milliGRAM(s) Oral before breakfast  polyethylene glycol 3350 17 Gram(s) Oral daily  povidone iodine 10% Solution 1 Application(s) Topical two times a day  senna 2 Tablet(s) Oral at bedtime      PHYSICAL EXAM:  T(C): 36.5 (04-05-25 @ 17:42), Max: 36.9 (04-05-25 @ 00:29)  HR: 87 (04-05-25 @ 17:42) (75 - 87)  BP: 100/66 (04-05-25 @ 17:42) (96/60 - 114/70)  RR: 18 (04-05-25 @ 17:42) (18 - 18)  SpO2: 95% (04-05-25 @ 17:42) (95% - 99%)  Wt(kg): --  I&O's Summary    04 Apr 2025 07:01  -  05 Apr 2025 07:00  --------------------------------------------------------  IN: 480 mL / OUT: 500 mL / NET: -20 mL            Appearance: NAD  HEENT:  Dry oral mucosa, PERRL, EOMI	  Lymphatic: No lymphadenopathy  Cardiovascular: Normal S1 S2, No JVD, No murmurs, No edema  Respiratory: Lungs clear to auscultation	  Psychiatry: A & O x 3, Mood & affect appropriate  Skin: No rashes, No ecchymoses, No cyanosis	  Neurologic: Non-focal  GI/Abd: Soft, obese, nontender. Dressing to Q C/D/I. Suprapubic catheter in place  Ext: Palp femoral pulses bilat. BLE atrophic, minimal dorsi/plantarflexion bilaterally. Sensation grossly intact. LLE edematous compared to R, erythema to right toes/forefoot. mottling of right toes/forefoot/heel  LLE:  small superficial abrasion, +edema and +ecchymosis over L knee  +TTP over L knee, no TTP along remainder of extremity; compartments soft  Limited ROM at knee 2/2 pain  No gross varus/valgus laxity, but assessment limited 2/2 pain  Motor: TA/EHL/GS/FHL intact  Sensory: DP/SP/Tib/Jordan/Saph SILT  +DP pulse (symmetric relative to contralateral side), WWP   pressure wound from the LLE immobilizer posteriorly proximal to the knee.  Vascular: Peripheral pulses palpable 2+ bilaterally          LABS:    CARDIAC MARKERS:                                9.3    6.30  )-----------( 258      ( 05 Apr 2025 11:25 )             31.3     04-05    137  |  101  |  25[H]  ----------------------------<  68[L]  4.3   |  25  |  2.83[H]    Ca    7.5[L]      05 Apr 2025 11:25      proBNP:   Lipid Profile:   HgA1c:   TSH:             TELEMETRY: 	    ECG:  	  RADIOLOGY:   DIAGNOSTIC TESTING:  [ ] Echocardiogram:  [ ]  Catheterization:  [ ] Stress Test:    OTHER:

## 2025-04-05 NOTE — PROGRESS NOTE ADULT - SUBJECTIVE AND OBJECTIVE BOX
Date of Service: 04-05-25 @ 10:27    Patient is a 75y old  Male who presents with a chief complaint of ESRD requiring HD, uremia (04 Apr 2025 18:34)      Any change in ROS:   No new respiratory events overnight. Denies SOB/CP.      MEDICATIONS  (STANDING):  albuterol/ipratropium for Nebulization 3 milliLiter(s) Nebulizer every 6 hours  aspirin  chewable 81 milliGRAM(s) Oral daily  atorvastatin 40 milliGRAM(s) Oral at bedtime  chlorhexidine 2% Cloths 1 Application(s) Topical daily  clopidogrel Tablet 75 milliGRAM(s) Oral daily  Dakins Solution - 1/4 Strength 1 Application(s) Topical every 8 hours  epoetin aleah-epbx (RETACRIT) Injectable 03912 Unit(s) IV Push <User Schedule>  heparin   Injectable 5000 Unit(s) SubCutaneous every 8 hours  hydrOXYzine hydrochloride 25 milliGRAM(s) Oral three times a day  ibuprofen  Tablet. 400 milliGRAM(s) Oral every 12 hours  lactobacillus acidophilus 1 Tablet(s) Oral two times a day with meals  mupirocin 2% Ointment 1 Application(s) Topical <User Schedule>  Nephro-margaret 1 Tablet(s) Oral daily  oxyCODONE  ER Tablet 30 milliGRAM(s) Oral every 12 hours  pantoprazole    Tablet 40 milliGRAM(s) Oral before breakfast  polyethylene glycol 3350 17 Gram(s) Oral daily  povidone iodine 10% Solution 1 Application(s) Topical two times a day  senna 2 Tablet(s) Oral at bedtime    MEDICATIONS  (PRN):  acetaminophen     Tablet .. 650 milliGRAM(s) Oral every 6 hours PRN Temp greater or equal to 38C (100.4F), Mild Pain (1 - 3)  benzocaine/menthol Lozenge 1 Lozenge Oral three times a day PRN Sore Throat  bisacodyl 5 milliGRAM(s) Oral every 12 hours PRN Constipation  HYDROmorphone   Tablet 4 milliGRAM(s) Oral every 4 hours PRN Severe Pain (7 - 10)  oxyCODONE    IR 10 milliGRAM(s) Oral every 4 hours PRN Moderate Pain (4 - 6)  sodium chloride 0.9% lock flush 10 milliLiter(s) IV Push every 1 hour PRN Pre/post blood products, medications, blood draw, and to maintain line patency    Vital Signs Last 24 Hrs  T(C): 36.9 (05 Apr 2025 00:29), Max: 36.9 (05 Apr 2025 00:29)  T(F): 98.4 (05 Apr 2025 00:29), Max: 98.4 (05 Apr 2025 00:29)  HR: 75 (05 Apr 2025 00:29) (60 - 87)  BP: 96/60 (05 Apr 2025 00:29) (96/60 - 104/70)  BP(mean): --  RR: 18 (05 Apr 2025 00:29) (18 - 18)  SpO2: 96% (05 Apr 2025 00:29) (92% - 96%)    Parameters below as of 05 Apr 2025 00:29  Patient On (Oxygen Delivery Method): room air        I&O's Summary    04 Apr 2025 07:01  -  05 Apr 2025 07:00  --------------------------------------------------------  IN: 480 mL / OUT: 500 mL / NET: -20 mL          Physical Exam:   GENERAL: NAD, well-groomed, well-developed  HEENT: VINNY/   Atraumatic, Normocephalic  ENMT: No tonsillar erythema, exudates, or enlargement; Moist mucous membranes, Good dentition, No lesions  NECK: Supple, No JVD, Normal thyroid  CHEST/LUNG: Decreased BS   CVS: Regular rate and rhythm; No murmurs, rubs, or gallops  GI: : Soft, Nontender, Nondistended; Bowel sounds present  NERVOUS SYSTEM:  Alert & Oriented X3  EXTREMITIES: LE wounds   LYMPH: No lymphadenopathy noted  SKIN: No rashes or lesions  ENDOCRINOLOGY: No Thyromegaly  PSYCH: Appropriate      Studies  < from: CT Abdomen and Pelvis w/ IV Cont (03.25.25 @ 13:43) >    ACC: [id number] EXAM:  CT CHEST IC   ORDERED BY:  NELL TOLBERT     ACC: [id number] EXAM:  CT ABDOMEN AND PELVIS IC   ORDERED BY:  NELL TOLBERT     PROCEDURE DATE:  03/25/2025          INTERPRETATION:  CLINICAL INFORMATION: Evaluate for pneumonia. Abdominal   pain.    COMPARISON: CT chest 2/23/2025. CT abdomen and pelvis 2/20/2025.    CONTRAST/COMPLICATIONS:  IV Contrast: Omnipaque 350  90 cc administered   10 cc discarded  Oral Contrast: NONE    PROCEDURE:  CT of the Chest, Abdomen and Pelvis was performed.  Sagittal and coronal reformats were performed.    FINDINGS:  CHEST:  LUNGS AND LARGE AIRWAYS: Patent central airways. Small amount of debris   within the trachea. Right lower lobe consolidative opacities. Additional   consolidative opacities in the left lower lobe, possibly atelectasis. A   few ill-defined nodular opacities in the left lower lobe measuring up to   6 mm.  PLEURA: Small left pleural effusion.  VESSELS: Right IJ approach catheter with tip in the superior cavoatrial   junction. Linear nonocclusive filling defect within the right internal   jugular vein (3:1). Atherosclerotic changes. Coronary artery   calcification.  HEART: Heart size is normal. Aortic valve and mitral annular   calcification. No pericardialeffusion.  MEDIASTINUM AND MARYANNE: No lymphadenopathy.  CHEST WALL AND LOWER NECK: Within normal limits.    ABDOMEN AND PELVIS:  LIVER: Subcentimeter hypodensity in the left lobe that is too small to   characterize.  BILE DUCTS: Normal caliber.  GALLBLADDER: Cholelithiasis.  SPLEEN: Within normal limits.  PANCREAS: Within normal limits.  ADRENALS: Within normal limits.  KIDNEYS/URETERS: Bilateral mild hydroureteronephrosis to the level of the   urinary bladder without evidence of obstructive urolithiasis, not   significantly changed from the prior exam of 2/20/2025. Diffuse   urothelial thickening of the bilateral ureters and collecting systems,   also similar to the previous CT. An exophytic hypodense right renal   lesion measuring 4.1 cm is unchanged.    BLADDER: Underdistended with a suprapubic catheter in place.  REPRODUCTIVE ORGANS: Enlarged prostate. Linear densities in the prostate,   possibly from previous UroLift procedure; correlate with the patient's   surgical history.    BOWEL:Rectal wall thickening. Sigmoid anastomosis. No evidence for bowel   obstruction. Normal appendix.  PERITONEUM/RETROPERITONEUM: Within normal limits.  VESSELS: Atherosclerotic changes. Bilateral common iliac/external iliac   artery stents. Previouslydescribed bilateral external iliac and   bilateral superficial femoral artery occlusions seen on the prior CTA is   not well assessed by this exam.  LYMPH NODES: Small left para-aortic lymph node measuring 1.1 x 0.9 cm,   unchanged.  ABDOMINAL WALL: Suprapubic catheter. Subcutaneous gas and subcutaneous   infiltration tracking through the right medial buttock. No discrete   associated collection within the field-of-view..  BONES: Degenerative changes. Bilateral femoral fixation hardware.    IMPRESSION:    CHEST:  *  Right lower lobe consolidative opacities, which may represent   pneumonia. Additional consolidative opacities in the left lower lobe,   potentially atelectasis or infection. A few ill-defined nodular opacities   in the left lower lobe measuring up to 6 mm may also be   infectious/inflammatory in etiology. Suggest continued attention on   follow-up imaging.  *  Small amount of debris in the trachea.  *  Small left pleural effusion.  *  Linear nonocclusive filling defect within the right internal jugular   vein, which may represent residual fibrin sheath or nonocclusive thrombus.    ABDOMEN AND PELVIS:  *  Subcutaneous gas and infiltration tracking along the medial right   buttock, potentially related to a decubitus wound with gas tracking from   the external environment. A necrotizing airforming infection is also a   possibility. Correlate with physical exam.  *  Rectal wall thickening, suggestive of proctitis.  *  Bilateral mild hydroureteronephrosis to the level of the urinary   bladder without evidence of obstructive urolithiasis, and not   significantly changed since 2/20/2025.  *  Diffuse urothelial thickening of the bilateral renal collecting   systems and ureters, which may be seen with an ascending urinary tract   infection or inflammation, similar to the previous CT 2/20/2025.    Findings were discussed with Dr. NELL TOLBERT 3/25/2025 4:30 PM by   Dr. Calzada.    --- End of Report ---            < end of copied text >

## 2025-04-06 NOTE — PROGRESS NOTE ADULT - ASSESSMENT
74 year old male with CKD, sp polio, paraplegia with associated neurogenic bladder s/p suprapubic catheter who was admitted s/p fall on 2/16.   CKD - ESRD, now s/p DEYA [place] 2/17, on HD  ruled out cellulitis around suprapubic cath  2/20 s/p RRT for tremors and confusion -- pt with chronic tremors although notably worse  2/20 CXR with clear lungs   SPC site with chronic/stable inflammatory changes, no drainage - no sign of SSTI  CTA abd with occlusion of b/l external iliac arteries and b/l superficial femoral arteries with distal reconstitution at popliteal arteries; patent three vessel runoff of RLE with 2 vessel runoff of LLE with occlusion of L mid anterior tibial artery with distal reconstitution  Vascular following for worsening PAD with worsening ischemic changes   -3/3 s/p RLE angiogram b/l iliac artery stents   mental status at baseline    3/16 no tenderness at suprapubic catheter site, no visible erythema at catheter site on exam  UA w/ many epithelial cells, Ucx negative  s/p zosyn 3/14-3/16  3/19 SPC site remains without erythema but now noted with some tan crusting on dressing  3/23 reporting vomiting x5 episodes, no diarrhea or other focal sx  Treated for possible SPC site infection with bactrim 3/19-3/25  3/25 CT Chest noted RLL consolidative opacities, LLL opacities - pneumonia vs atelectasis, few ill defined nodular opacities in LLL infectious vs inflammatory  3/25 CTAP with subcutaneous gas and infiltration tracking along medial R buttock, possibly related to decubitus wound with gas tracking from the external environment, possible necrotizing air forming infection; rectal wall thickening suggestive of proctitis; b/l mild hydroureteronephrosis to level of bladder with no obstruction and diffuse urothelial thickening of b/l renal collecting systems and ureters similar to 2/20  MRSA screen has been negative   pt with no resp symptoms- no cough, dyspnea or pain, may have aspirated with vomiting earlier, saturating well on RA  3/26 s/p bedside debridement of L knee wound  as dry eschar  from wound edges without drainage and bedside excisional debridement of unstageable sacrum to left buttock into perineum evolving unstageable pressure injury through necrotic dermis into nonviable subcutaneous tissues, debulking debridement of necrotic tissue done by Wound care; other wounds noted including L calf to ankle wound with liquefaction necrotic drainage; R ischium wound with moist pink granular tissue and L buttock fading DTI  3/30 overnight febrile to 100.9F ?inflammatory -- now afebrile >24h, WBC wnl, remains on meropenem   dry cough noted, COVID/Flu/RSV negative  loose stools per pt after bowel regimen--no diarrhea, some nausea with no vomiting   4/4 remains afebrile, no leukocytosis, overall feels better     Recommendations:   given location of wounds, patient at risk of contamination, further skin/wound breakdown due to pressure 2/2 being bedbound and prolonged course of antibiotics unlikely to resolve this and will increase risk of C.diff, development of resistant organisms making treatment of infection more difficult in the future in addition to antibiotic adverse effects, therefore, completed 10d course of meropenem on 4/3 -- discontinued now and no obv acute infection no sig purulence, no sg surrounding erythema  Continue off antibiotics, will discuss complex case with Dr Ramirez on Monday and surgical recommendation for further debridement    Thank you for consulting us and involving us in the management of this most interesting and challenging case.  In addition to reviewing history, imaging, documents, labs, microbiology, took into account antibiotic stewardship, local antibiogram and infection control strategies and potential transmission issues.    We will follow along in the care of this patient. Please contact me by texting me directly on my cell# at [phone] using TEAMS or call our answering service at [phone] with any concerns.    Starting tomorrow Dr. Bridgette Ramirez will be covering for our group. If you have any questions, concerns or new micro data please reach out to them using TEAMS *PREFERRED* or by calling our service at [phone]

## 2025-04-06 NOTE — CONSULT NOTE ADULT - ASSESSMENT
76 yo M w/ PMHx of multiple comorbidities including polio, chronic pain with multiple sequela including LE weakness requiring wheelchair attributed to polio since 18 months of age, scoliosis, neurogenic bladder s/p suprapubic catheter, and colostomy s/p iatrogenic rectal rupture injury, reversal of colostomy, anemia, erectile dysfunction, multiple fractures including patella and femoral condyle with gianluca placement of the lower extremities, deep venous thrombosis, elevated hemidiaphragm, hyperlipidemia, bacterial sepsis, electrolyte imbalance, shoulder surgery, cataract surgery, tremors, PAD who initially presented to ED after fall from wheelchair found to have ESRD now on HD w progressive wounds. Surgery consulted for evaluation of sacral wound.     Recommendations:   - No emergent surgical intervention indicated at this time  - Patient is afebrile, HDS, no leukocytosis. On exam, sacral wound w/o purulence, surrounding fluctuance, malodor   - Discussed nature of sacral wound and necrotic center and need for debridement to further evaluate underlying tissue. Patient refusing debridement at bedside   - Will discuss possible OR for debridement   - Sacral wound with good separation from rectum, no stool noted in wound. No communication noted on exam. Likely defer diverting ostomy pending debridement  - Recommend frequent offloading     Discussed w/ attending       ACS/Trauma  [id number]

## 2025-04-06 NOTE — PROGRESS NOTE ADULT - SUBJECTIVE AND OBJECTIVE BOX
Subjective  Urology reconsulted for pain and discharge around SPT.     Objective    Vital signs  T(F): , Max: 98.1 (04-06-25 @ 00:25)  HR: 82 (04-06-25 @ 08:28)  BP: 103/57 (04-06-25 @ 08:28)  SpO2: 96% (04-06-25 @ 08:28)  Wt(kg): --    Output       Gen: NAD  Abd: soft, nontender, nondistended  : 20 fr SPT draining cloudy urine. Mild erythema just around SPT site, minimal mucoid discharge around tube when palpated.     Labs      04-06 @ 09:05    WBC 6.03  / Hct 35.7  / SCr 3.36     04-05 @ 11:25    WBC 6.30  / Hct 31.3  / SCr 2.83           Imaging

## 2025-04-06 NOTE — PROGRESS NOTE ADULT - ASSESSMENT
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 He is well known to me from multiple recent admisions  s/p admissions at [facility] 12/20-12/25/24 and 2/4-2/7 with SONDRA on CKD with associated uremic symptoms.   At the last admission he had expressed strong interest to try to hold off with HD intiation but he has been getting worse clinically at home.  His SONDRA and uremic symptoms significantly improved after the first admission; they improved a to mild extent during the 2nd admission to the point where he was able to be discharged without HD. Since then, however, he has worsened further.   He has been suffering from progressively worsening diffuse pruritus, loss of appetite, and generalized weakness and falls.   His nephrologist and PCP has been speaking with him and his family over the past few days,   The initial plan was that he would present to the [facility] ER Monday 2/17 (ie tomorrow) for HD initiation. Today, however, he fell and sustained trauma to his knee. Given the fall and concern for knee fracture, he presented to the ER today. multiple xrays show no Fractures.      CKD5, uremia, requiring initiation of HD  Rash, seborrheic dermatitis  Pruritis  Anemia  PAD  SP catheter, chronic  Left inferior pole acute on chronic patella Fx    plan  - HD via R subclavian permacath  -4/6: spoke w ptn's daughter Yanique on the phone today x 20 min, spoke to ptn , his wife and daughter yanique at the bedside x 60 min today. ptn is agreeing to debridement in the OR, adamantly refusing diverting colostomy he would consider it if post debridement he would not improve and would require another debridement. ptn was seen by urology and ID bc daughter complained increased amount of "pus" at the SPT, both ID and urology feel there is no acute infection/cellulitis, the drainage at the site and expected and not unusual. daughter and wife state ptn didnt have sacral wound when he came. on admission with the aide of his wife we flipped him and he had 1st degree pressure decubiti sacrally, skin was intact.   - 4/5: thorough eval by wound care attending 4/4/25, case d/w wound care attending Dr Dugan. as per her recommendation placed general surgery consult for OR debridement and diverting colostomy. ptn had sacral decubiti prior to admission  -4/4: dr dugan from wound care had an extensive conversation w the ptn and daughter yanique on the phone at bedside. ptn needs general surgery consult for OR debridement of necrotic tissue of the sacral decubiti and diverting colostomy. ptn did not tolerate debridement at the bedside. . completed Abx for PNA  -4/3: ptn's insurance changed to straight medicare/medicaid. now able to go to [facility], but ptn wants to go home. will need equipment set up. this was d/w CM and ACP, daughter Yanique on the phone and wife at bedside today. today is last day of ABx. will transition pain meds to po DIlaudid from IV, will also need outptn pain management F/U, outptn wound care, home PT, need outptn HD set up  -4/2: seen prior to going to HD, no new events. tomorrow completing 10 day course of Meropenem. ongoing need for narcotics 2/2 pain.   -4/1: ptn is frustrated about a prolonged hospitalization but states he feels better overall and once medically cleared he will be ready to go home, not sooner than 4/7. completing ABx on 4/3.   -3/31: at HD today had 1.7 L UF, post HD was hypotense, gave gentle IVF , BP still a bit low. afebrile, loose BMs but not diarrhea, GI PCR not sent, RVP neg. . on Meropenem course for total of 10 days, last day 4/3, would care for decubiti and left knee wound post trauma  -3/30: Tmax 100.9, c/o dry cough and diarrhea but not watery. pain is controlled at the time of visit. wife at bedside. will check RVP panel, GI PCR, sine diarrhea non watery will not order C. Diff. ID to follow  - 3/29: cont meropenem, seen by coverage  - 3/28: pain from decubitus ulcer and Left knee post debridement continues. wound care recs in place. on Meropenem for PNA, pulm and ID following. HD as per renal. HDS  -3/27: ptn is complaining that pain post decubital and left knee debridement has been severe. his daughter is on speaker phone, wife is at bedside. his pain is controlled when meds are administered. he is very uncomfortable due to the pain at the site of decubitus debridement. lyrica helped his pain before but made him drowsy, griffin with gabapentin. he doesnt want them to be resumed. will cont dilaudid, oxycodone, oxycontin, ibuprofen. he is on Meropenem for aspiration PNA/HCAP. [facility] for DVT ppx. seen by vascular cardiology to address R IJ post shiley non occlusive DVT. full AC was not recommended. will check rpt doppler in 3-4 weeks.   - 3/26: Wound care performed bedside debridement of Left knee wound 2/2 lifted eschar, and sacral decubitus. findings c/w possible infection and mainly fat necrosis. cont Meropenem. ID following. podiatry following for gangrenous toes. will give additional single dose IV DIlaudid 2/2 severe pain post debridement.    - 3/25: wife at bedside, daughter on speaker phone at bedside. ptn is pain free at the time of visit and states he wants to cont the pain regiment he is presently on. ct C/A/P revealed: RLL PNA, prob aspiration, stercoral proctitis, decubitus ulcer w possible progression to sacral OM, R IJ nonocclusive DVT. these findings d/w ptn ,his family, wound care, ACP, Vascular, Nephrology, ID, pUlm, GI. meropenem initiated, Bactrim stopped. ptn states when he is cleared for DC, he wants to go home , not to [facility], not to SNF. daughter and wife aware.   -3/24: ptn  was seen by GI for N/V, its not clear the etiology, will obtain Ct C/A/P. ptn states he is coughing up green mucus as well  -3/23:  ptn is in good spirits, says he vomited "spit" this am, but tolerated all meals without vomiting. no abd pain, no undigested food in the vomit. afebrile, no diarrhea, RVP neg. GI consulted.   -3/21-22: ptn feels well.  pain is controlled. still no accepting facility for [facility]  -3/20: ptn states he has severe low back and LLE pain though overall is improving.   -3/19: ptn w tan crusting around SPC , states its painful. started on po Bactrim, renally dosed by ID for cellulitis.   -3/18: LLE paraesthesias are chronic, will d/w CM re dc planning to [facility]    -3/17: ptn states he is lethargic, he has LLE numbness which is new and severe pain at SPC insertion site. this was ID, d/w neuro, renal and vascular. as per ID: no sign of an acute infection recommend CT A/P.   -3/16:  urine cx from 3/15 NTD, zosyn DCed, awaiting dc to [facility], not sure will be able to go to [facility] since there is an insurance coverage conflict. HD as per renal  - 3/15: spoke w ptn's daughter today re denial from [place] for [facility]. ptn has "[employer] health medicare advantage plan, which [facility] doesnt accept. i recommended to speak  to Cm and to the insurance company to find participating facilities. ptn is stable today. pain and erythema at SP catheter site has resolved today. seen by ID, will cont ZOSYN for now. UCx sent.   - 3/14: suprapubic tube changhed by BETTY, ptn has crusting at the insertion site and pain. will start Abx, urine always has sediment, will sent for cx, start Vanco/ZOsyn. ID consult called. check MRSA/MSSA PCR  -3/13: ptn is doing well. ptn has an accepting rehab facility, now pending AUTH.   -3/12: ptn is no longer constipated. doing well off prn IV DIlaudid. awaiting dc to [facility]  -3/11: ptn has no new c/o other than feeling constipated, lactulose ordered. also in preparation for [facility] will dc prn IV DIlaudid, cont prn OXy IR  -3/10:  ptn is awake, alert, wife at bedside, i instructed them to give rehab choices. also awaiting SPC change to be done by urology. pain is controlled  -3/9: seen by PT, will refer to [facility]. choices to be given in AM. this was d/w ptn, daughter, wife.   -3/8:  ptn didnt want to get PT eval done, will reorder. ptn needs [facility] placement. PMNR consult ordered  - 3/7: ptn is alert , pain is controlled, DC planning d/w ptn and HCP, daughter Yanique  -3/6:  ptn is awake, alert, ecchymotic feet look improved, pain is controlled. DC planning to [facility]  3/4-5 - s/p b/l iliac arteries angioplasty and stent placements on 3/3. today has new ecchymoses in b/l LE, concern for arterial insufficiency. vascular aware.. wound from Left knee immobilizer is dressed and healing. pain is controlled.   LEFT UE AVF placement/scheduling will be on outptn basis as per vascular. dc planning to [facility]  - 3/3: ptn is s/p angiogram RLE : b/l iliac arteries w successful angioplasty and stents. in PACU. awaitng HD.  ptn has a pressure wound from the LLE immobilizer posteriorly proximal to the knee. its been on 2/2 knee fracture, applied by ortho and managed by ptn's wife as per ptn's and wife's request , this was d/w nursing and nurse manager ms Ruiz.. immobilizer should be managed by orthopedics and nursing. will keep it off, only keep on when repositioning for care and then remove. wound care to F/U . this was discussed at length w daughter Yanique and wife Dee Dee.   -3/1-3/2: ptn is smiling, pain free, had HD 2/28 and 3/1, awaiting RLE angiogram 3/3, on Heparin drip, euvolemic  -2/28: ptn is lethargic, awaiting RLE angiogram possible fem-fem bypass hopefully on 3/3 pending OR availability, awaiting HD today    -2/27:  plan for angiogram in am with possible angioplasty, possible stent, possible fem-fem bypass. medically cleared for angiogram and possible surgery, stent, angioplasty. pain is controlled. remains on heparin drip as per vascular. HD as per renal    -2/26:  ptn is sleeping, pain is controlled, he was seen by wound care and vascular attending. planning on angiogram 2/2 severe PAD in LE on CTA. he also spoke to HCP. ptn and HCP in agreement. ptn was started on HEPARIN drip as per vascular recs. RIJ permacath done 2/25    - 2/25: ptn states he is hallucinating, but not at present, his MS is at baseline, his pain is controlled, he still needs prn pain meds when he is moved in the bed or for testing or for HD. awaiting Permacath, AVF creation, LE bypass to be d/w Dr. Swenson and the ptn tomorrow. ptn has cardiology clearance  if he opts for. Ptn has sacral decubiti and posterior thigh and buttock decibiti and b/l heel decubiti. Z flow bootie d/w RN, get wound care consult    -2/24: pain is controlled  if the LLE is immobile and fully extended. doesn't tolerate the knee immobilizer. has a medium condyle and acute on chronic patella fx in LEFT knee. as per vascular ptn will need iliac stent  w a fem-fem bypass, possible axillo-femoral bypass. for this surgery he would need cardiac work up . more pressing surgery is LUE AVF creation,  in IR will arrange for Permacath. for pain control: Motrin 400 mg q12H, Oxy ER 30 mg q12H, Oxy IR 10 for mod pain and dilaudid 2 mg iv for severe pain. still has pruritis though improved, will raise atarax 25 mg to tid. plan of care d/w daughter Norma Persaud , ptn and his wife. Also d/w renal, card, vascular, ACP    -2/23: Daughter Yanique is the HCP, she and the ptn filled out the paperwork. daily plan and findings d/w her and the ptn. MS is at abseline, c/o b/l LE foot pain and Left Knee pain. xrays of left knee: cannot r/o acute on chronic inferior pole patellar Fx. knee immobilizer is on, awaiting ortho consult. doubt needs any intervention awaiting Ct chest today, will add CT Left knee. ptn states itching has recurred, severe pain has recurred. will resume Atatrax ( 25 mg bid) and Oxycodone ER , but at a lower dose 15 mg q12H. cont prn analgesics. HD as per renal, awaiting Vascular consult f/u re CTA A/L &LE and recs. ptn also wants AVF surgery on this admission. Coughing has stopped    - 2/22: ptn is drowsy but arousable, calmer today, answers questions appropriately. he is pain free, he stopped coughing, he denies having pruritis: will DC:  OXY ER, Atarax, Tessalon perles.     -  2/21: ptn is tearful, a bit confused, coughing, recognizes me and family at bedside. GOC d/w daughter and wife. AMS prob delirium 2/2 acute illness +/- opiods, lyrica, atarac. will lower atarak to tid, dc lyrica, cont Oxy 2/2 ptn has severe LE pain. neuro called for eval. Head ct done yesterday w no acute findings. CTA A/P w LE run fof: severe PAD, vascular to follow up on plan fo care. plan for HD tomorrow and next week place on tiw schedule of MWF. will need tunneled catheter prior to DC and will d/w vascular scheduling of AVF. ptn wants all to be done while inptn. daughter wants to be HCP as per ptn's wishes. she was given paperwork to get it signed and witnessed and will present to nursing thereafter. details of findings and complaints and plan of care d/w daughter and wife. spent 60 min. RVP ordered. start mucinex , tessalon perles, duonebs, get CT chest to r/o PNA. pulm called    -  2/20:  ptn had RRT 2/2 AMS and tremors. no SZ, no LOC. noted to have Na 123( hyponatremia), given 500 cc NS. Gabapentin DCed. ptn had been taking gabapentin at home PTA. Pain is controlled. Pruritis resolved, tolerating HD otherwise.     - Pain management:  cont Oxycodone 10 IR q6H,  DIlaudid prn severe pain 2 mg q4H prn.   - cont outptn meds  - DVT ppx w [facility]SC

## 2025-04-06 NOTE — PROGRESS NOTE ADULT - ASSESSMENT
74 year old man with SONDRA on CKD requiring HD, now with nausea    1. ESRD on HD (new)     2.  pelvic pain over suprapubic cathter  consider checking UA/urology c/s    3. Proctitis d/t constipation which has since resolved   -maintain on senna qhs with Miralax twice daily   -enema as needed     4. Decubitus Ulcer   CT noted, f/u primary, ID and wound care teams   possible diverting ostomy

## 2025-04-06 NOTE — PROGRESS NOTE ADULT - SUBJECTIVE AND OBJECTIVE BOX
Patient is a 75y old  Male who presents with a chief complaint of ESRD requiring HD, uremia (06 Apr 2025 16:48)      SUBJECTIVE / OVERNIGHT EVENTS: spoke w ptn's daughter Cori on the phone today x 20 min, spoke to ptn , his wife and daughter cori at the bedside x 60 min today. ptn is agreeing to debridement in the OR, adamantly refusing diverting colostomy he would consider it if post debridement he would not improve and would require another debridement.     MEDICATIONS  (STANDING):  albuterol/ipratropium for Nebulization 3 milliLiter(s) Nebulizer every 6 hours  aspirin  chewable 81 milliGRAM(s) Oral daily  atorvastatin 40 milliGRAM(s) Oral at bedtime  bacitracin/polymyxin B Ointment 1 Application(s) Topical two times a day  chlorhexidine 2% Cloths 1 Application(s) Topical daily  clopidogrel Tablet 75 milliGRAM(s) Oral daily  Dakins Solution - 1/4 Strength 1 Application(s) Topical every 8 hours  epoetin aleah-epbx (RETACRIT) Injectable 33957 Unit(s) IV Push <User Schedule>  heparin   Injectable 5000 Unit(s) SubCutaneous every 8 hours  hydrOXYzine hydrochloride 25 milliGRAM(s) Oral three times a day  ibuprofen  Tablet. 400 milliGRAM(s) Oral every 12 hours  lactobacillus acidophilus 1 Tablet(s) Oral two times a day with meals  mupirocin 2% Ointment 1 Application(s) Topical <User Schedule>  Nephro-margaret 1 Tablet(s) Oral daily  oxyCODONE  ER Tablet 30 milliGRAM(s) Oral every 12 hours  pantoprazole    Tablet 40 milliGRAM(s) Oral before breakfast  polyethylene glycol 3350 17 Gram(s) Oral daily  povidone iodine 10% Solution 1 Application(s) Topical two times a day  senna 2 Tablet(s) Oral at bedtime    MEDICATIONS  (PRN):  acetaminophen     Tablet .. 650 milliGRAM(s) Oral every 6 hours PRN Temp greater or equal to 38C (100.4F), Mild Pain (1 - 3)  benzocaine/menthol Lozenge 1 Lozenge Oral three times a day PRN Sore Throat  bisacodyl 5 milliGRAM(s) Oral every 12 hours PRN Constipation  HYDROmorphone  Injectable 2 milliGRAM(s) IV Push every 4 hours PRN Severe Pain (7 - 10)  oxyCODONE    IR 10 milliGRAM(s) Oral every 4 hours PRN Moderate Pain (4 - 6)  sodium chloride 0.9% lock flush 10 milliLiter(s) IV Push every 1 hour PRN Pre/post blood products, medications, blood draw, and to maintain line patency      Vital Signs Last 24 Hrs  T(F): 97.4 (04-06-25 @ 15:56), Max: 98.1 (04-06-25 @ 00:25)  HR: 91 (04-06-25 @ 15:56) (81 - 91)  BP: 103/57 (04-06-25 @ 08:28) (103/57 - 121/74)  RR: 18 (04-06-25 @ 15:56) (18 - 18)  SpO2: 95% (04-06-25 @ 15:56) (95% - 96%)  Telemetry:   CAPILLARY BLOOD GLUCOSE        I&O's Summary      PHYSICAL EXAM:  GENERAL: NAD, well-developed  HEAD:  Atraumatic, Normocephalic  EYES: EOMI, PERRLA, conjunctiva and sclera clear  NECK: Supple, No JVD  CHEST/LUNG: Clear to auscultation bilaterally; No wheeze  HEART: Regular rate and rhythm; No murmurs, rubs, or gallops  ABDOMEN: Soft, Nontender, Nondistended; Bowel sounds present  EXTREMITIES:  2+ Peripheral Pulses, No clubbing, cyanosis, or edema  PSYCH: AAOx3  NEUROLOGY: non-focal  SKIN: No rashes or lesions    LABS:                        10.9   6.03  )-----------( 269      ( 06 Apr 2025 09:05 )             35.7     04-06    135  |  101  |  31[H]  ----------------------------<  73  4.3   |  20[L]  |  3.36[H]    Ca    7.4[L]      06 Apr 2025 09:05            Urinalysis Basic - ( 06 Apr 2025 09:05 )    Color: x / Appearance: x / SG: x / pH: x  Gluc: 73 mg/dL / Ketone: x  / Bili: x / Urobili: x   Blood: x / Protein: x / Nitrite: x   Leuk Esterase: x / RBC: x / WBC x   Sq Epi: x / Non Sq Epi: x / Bacteria: x        RADIOLOGY & ADDITIONAL TESTS:    Imaging Personally Reviewed:    Consultant(s) Notes Reviewed:      Care Discussed with Consultants/Other Providers:

## 2025-04-06 NOTE — PROGRESS NOTE ADULT - SUBJECTIVE AND OBJECTIVE BOX
[facility]  Dat Davis MD PhD, Maddi Eden MD, Amy Bailey MD, Bridgette Ramirez MD, Juma Angela MD  and providing coverage with Demetri Bosch MD  Providing Infectious Disease Consultations at [facility], [facility], [place], [place], [facility], [facility]'s    Office# [phone] to schedule follow up appointments  Answering Service for urgent calls or New Consults [phone]  Cell# to text for urgent issues Dat Davis [phone]     infectious diseases consult note:    BRIAN DEMPSEY is a 75y y. o. Male patient     HPI:  74 year-old man with multiple medical issues including polio at 18 months of age with residual paralytic issues, associated neurogenic bladder/suprapubic catheter, iatrogenic rectal rupture requiring colostomy 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 Multiple recent admisions at [facility] 12/20-12/25/24 and 2/4-2/7 with SONDRA on CKD with associated uremic symptoms. At the last admission he had expressed strong interest to try to hold off with HD intiation but he has been getting worse clinically at home. His SONDRA and uremic symptoms significantly improved after the first admission; they improved a to mild extent during the 2nd admission to the point where he was able to be discharged without HD. Since then, however, he has worsened further. He had been suffering from progressively worsening diffuse pruritus, loss of appetite, and generalized weakness and falls. His nephrologist and PCP had been speaking with him and his family over the past few days, The initial plan was that he would present to the [facility] ER Monday 2/17 for HD initiation. However, he fell and sustained trauma to his knee. Given the fall and concern for knee fracture, he presented to the ER 4/16 and was admitted. When I see him 4/6 he is very upset at the recommendation for further debridement and reports he just wants a change to try to heal.      PAST MEDICAL & SURGICAL HISTORY:  Polio      Hypertension      H/O urinary retention  suprapubic tube      HLD (hyperlipidemia)      BPH with obstruction/lower urinary tract symptoms      Erectile dysfunction      Bladder outlet obstruction      Presence of suprapubic catheter      Colostomy status      DVT, lower extremity      H/O cardiac murmur      S/P colostomy  2/2024      Suprapubic catheter      S/P ORIF (open reduction internal fixation) fracture  b/l legs      S/P cataract surgery  right      H/O shoulder surgery          Allergies    No Known Allergies    Intolerances        ANTIBIOTICS/RELEVANT:  antimicrobials    immunologic:  epoetin aleah-epbx (RETACRIT) Injectable 18463 Unit(s) IV Push <User Schedule>    OTHER:  acetaminophen     Tablet .. 650 milliGRAM(s) Oral every 6 hours PRN  albuterol/ipratropium for Nebulization 3 milliLiter(s) Nebulizer every 6 hours  aspirin  chewable 81 milliGRAM(s) Oral daily  atorvastatin 40 milliGRAM(s) Oral at bedtime  bacitracin/polymyxin B Ointment 1 Application(s) Topical two times a day  benzocaine/menthol Lozenge 1 Lozenge Oral three times a day PRN  bisacodyl 5 milliGRAM(s) Oral every 12 hours PRN  chlorhexidine 2% Cloths 1 Application(s) Topical daily  clopidogrel Tablet 75 milliGRAM(s) Oral daily  Dakins Solution - 1/4 Strength 1 Application(s) Topical every 8 hours  heparin   Injectable 5000 Unit(s) SubCutaneous every 8 hours  HYDROmorphone  Injectable 2 milliGRAM(s) IV Push every 4 hours PRN  hydrOXYzine hydrochloride 25 milliGRAM(s) Oral three times a day  ibuprofen  Tablet. 400 milliGRAM(s) Oral every 12 hours  lactobacillus acidophilus 1 Tablet(s) Oral two times a day with meals  mupirocin 2% Ointment 1 Application(s) Topical <User Schedule>  Nephro-margaret 1 Tablet(s) Oral daily  oxyCODONE    IR 10 milliGRAM(s) Oral every 4 hours PRN  oxyCODONE  ER Tablet 30 milliGRAM(s) Oral every 12 hours  pantoprazole    Tablet 40 milliGRAM(s) Oral before breakfast  polyethylene glycol 3350 17 Gram(s) Oral daily  povidone iodine 10% Solution 1 Application(s) Topical two times a day  senna 2 Tablet(s) Oral at bedtime  sodium chloride 0.9% lock flush 10 milliLiter(s) IV Push every 1 hour PRN      Social History: no toxic habits reported      FAMILY HISTORY:  FH: type 2 diabetes      ROS:    EYES:  Negative  blurry vision or double vision  GASTROINTESTINAL:  Negative for nausea, vomiting, diarrhea  -otherwise negative except for subjective    Objective:  Last 24-Vital Signs Last 24 Hrs  T(C): 36.6 (06 Apr 2025 08:28), Max: 36.7 (06 Apr 2025 00:25)  T(F): 97.9 (06 Apr 2025 08:28), Max: 98.1 (06 Apr 2025 00:25)  HR: 82 (06 Apr 2025 08:28) (81 - 87)  BP: 103/57 (06 Apr 2025 08:28) (100/66 - 121/74)  BP(mean): --  RR: 18 (06 Apr 2025 08:28) (18 - 18)  SpO2: 96% (06 Apr 2025 08:28) (95% - 96%)    Parameters below as of 06 Apr 2025 08:28  Patient On (Oxygen Delivery Method): room air        T(C): 36.6 (04-06-25 @ 08:28), Max: 36.9 (04-05-25 @ 00:29)  T(F): 97.9 (04-06-25 @ 08:28), Max: 98.4 (04-05-25 @ 00:29)  T(C): 36.6 (04-06-25 @ 08:28), Max: 36.9 (04-03-25 @ 17:51)  T(F): 97.9 (04-06-25 @ 08:28), Max: 98.4 (04-03-25 @ 17:51)  T(C): 36.6 (04-06-25 @ 08:28), Max: 37.2 (04-03-25 @ 00:07)  T(F): 97.9 (04-06-25 @ 08:28), Max: 98.9 (04-03-25 @ 00:07)    PHYSICAL EXAM:  Constitutional: upset, positioned on his left,  Eyes: PERRLA, EOMI  Ear/Nose/Throat: oropharynx normal	  Neck: no JVD, no lymphadenopathy, supple  Respiratory: no accessory muscle use, lung fields bilaterally clear  Cardiovascular: distant  Gastrointestinal: soft, NT, no HSM, BS-normal  Extremities: black necrotic toes, very limited LE and upper ext strength,   Neuro: patient alert, oriented and appropriate  Skin: multiple open areas, sacral area with central black eschar/necrotic area        LABS:                        10.9   6.03  )-----------( 269      ( 06 Apr 2025 09:05 )             35.7       WBC 6.03  04-06 @ 09:05  WBC 6.30  04-05 @ 11:25  WBC 6.14  04-01 @ 08:37  WBC 9.37  03-31 @ 08:49      04-06    135  |  101  |  31[H]  ----------------------------<  73  4.3   |  20[L]  |  3.36[H]    Ca    7.4[L]      06 Apr 2025 09:05        Creatinine: 3.36 mg/dL (04-06-25 @ 09:05)  Creatinine: 2.83 mg/dL (04-05-25 @ 11:25)  Creatinine: 2.87 mg/dL (04-01-25 @ 08:37)  Creatinine: 3.63 mg/dL (03-31-25 @ 08:49)        Urinalysis Basic - ( 06 Apr 2025 09:05 )    Color: x / Appearance: x / SG: x / pH: x  Gluc: 73 mg/dL / Ketone: x  / Bili: x / Urobili: x   Blood: x / Protein: x / Nitrite: x   Leuk Esterase: x / RBC: x / WBC x   Sq Epi: x / Non Sq Epi: x / Bacteria: x            MICROBIOLOGY:              RADIOLOGY & ADDITIONAL STUDIES:

## 2025-04-06 NOTE — PROGRESS NOTE ADULT - ASSESSMENT
74y Male with PMHx of multiple medical issues including polio with associated neurogenic bladder/suprapubic catheter (last exchanged 1/31), iatrogenic rectal rupture requiring colostomy 2/2023, and stage 5 chronic kidney disease, recent admission [facility] 12/20-12/25/24 with ischemic ATN with associated hyperkalemia and uremia (did not receive dialysis at that time) presents to the ED for eval of worsening generalized weakness, fatigue, loss of appetite, pruritus  and pain at suprapubic mcconnell site. Urology initially consulted due to recent SPT tract dilation.      Urology now reconsulted for pain and discharge around SPT site. Pt reports pain started a few days ago. SPT has been draining cloudy urine.   Previous notes document erythema around SPT and crusting/discharge. Exam today consistent with previously documented exams.     Recs  - No acute urologic intervention   - Continue with monthly SPT changes, next exchange due next week.  - Pain control prn.  - Irrigate SPT PRN with NS to prevent debris obstruction, can consider flushing 1x daily due to debris in tubing   - Monitor urine output   - No further inpatient urologic intervention, patient to follow-up with his urologist after discharge    Patient seen and examined with Dr. Singh.    [facility]  [address] [place] [address] [phone]     74y Male with PMHx of multiple medical issues including polio with associated neurogenic bladder/suprapubic catheter (last exchanged 1/31), iatrogenic rectal rupture requiring colostomy 2/2023, and stage 5 chronic kidney disease, recent admission [facility] 12/20-12/25/24 with ischemic ATN with associated hyperkalemia and uremia (did not receive dialysis at that time) presents to the ED for eval of worsening generalized weakness, fatigue, loss of appetite, pruritus  and pain at suprapubic mcconnell site. Urology initially consulted due to recent SPT tract dilation.      Urology now reconsulted for pain and discharge around SPT site. Pt reports pain started a few days ago. SPT has been draining cloudy urine.   Previous notes document mild erythema around SPT and crusting/discharge. Exam today consistent with previously documented exams.     Recs  - No acute urologic intervention   - Continue with monthly SPT changes, next exchange due next week.  - Pain control prn.  - Irrigate SPT PRN with NS to prevent debris obstruction, can consider flushing 1x daily due to debris in tubing   - Monitor urine output   - No further inpatient urologic intervention, patient to follow-up with his urologist after discharge    Patient seen and examined with Dr. Singh.    [facility]  [address] [place] [address] [phone]

## 2025-04-06 NOTE — PROGRESS NOTE ADULT - ASSESSMENT
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 HD tomorrow. family undecided on rehab at this point vs home. this would determine HD placement.     (2)Hyponatremia - Na+ improving/stable on high-Na+ bath with HD    (3)Anemia - s/p IV iron; on Retacrit with HD    (4)PAD - s/p LE bypass 3/3/25 - cyanotic changes of toes b/l     (5)CV - multifactorial LE edema; improving with increasing intradialytic UF, and with improving nutritional parameters    (6)Neuro - reduced generalized strength - getting PT/awaiting NYDIA    (7)Sacral decub - wound care eval noted, general surgery consulted for OR debridement and diverting colostomy.    RECOMMEND:   (1)next HD tomorrow per MWF schedule  (2)Retacrit with HD  (3)LE wound/bypass per vascular  (4)Dose Rx for CrCl <10 ESRD MWF    Beryl Darden DNP  [facility]  [phone]

## 2025-04-06 NOTE — PROGRESS NOTE ADULT - SUBJECTIVE AND OBJECTIVE BOX
Subjective: Patient seen and examined. No new events except as noted.     REVIEW OF SYSTEMS:    CONSTITUTIONAL: No weakness, fevers or chills  EYES/ENT: No visual changes;  No vertigo or throat pain   NECK: No pain or stiffness  RESPIRATORY: No cough, wheezing, hemoptysis; No shortness of breath  CARDIOVASCULAR: No chest pain or palpitations  GASTROINTESTINAL: No abdominal or epigastric pain. No nausea, vomiting, or hematemesis; No diarrhea or constipation. No melena or hematochezia.  GENITOURINARY: No dysuria, frequency or hematuria  NEUROLOGICAL: No numbness or weakness  SKIN: No itching, burning, rashes, or lesions   All other review of systems is negative unless indicated above.    MEDICATIONS:  MEDICATIONS  (STANDING):  albuterol/ipratropium for Nebulization 3 milliLiter(s) Nebulizer every 6 hours  aspirin  chewable 81 milliGRAM(s) Oral daily  atorvastatin 40 milliGRAM(s) Oral at bedtime  chlorhexidine 2% Cloths 1 Application(s) Topical daily  clopidogrel Tablet 75 milliGRAM(s) Oral daily  Dakins Solution - 1/4 Strength 1 Application(s) Topical every 8 hours  epoetin aleah-epbx (RETACRIT) Injectable 57788 Unit(s) IV Push <User Schedule>  heparin   Injectable 5000 Unit(s) SubCutaneous every 8 hours  hydrOXYzine hydrochloride 25 milliGRAM(s) Oral three times a day  ibuprofen  Tablet. 400 milliGRAM(s) Oral every 12 hours  lactobacillus acidophilus 1 Tablet(s) Oral two times a day with meals  mupirocin 2% Ointment 1 Application(s) Topical <User Schedule>  Nephro-margaret 1 Tablet(s) Oral daily  oxyCODONE  ER Tablet 30 milliGRAM(s) Oral every 12 hours  pantoprazole    Tablet 40 milliGRAM(s) Oral before breakfast  polyethylene glycol 3350 17 Gram(s) Oral daily  povidone iodine 10% Solution 1 Application(s) Topical two times a day  senna 2 Tablet(s) Oral at bedtime      PHYSICAL EXAM:  T(C): 36.6 (04-06-25 @ 08:28), Max: 36.7 (04-06-25 @ 00:25)  HR: 82 (04-06-25 @ 08:28) (81 - 87)  BP: 103/57 (04-06-25 @ 08:28) (100/66 - 121/74)  RR: 18 (04-06-25 @ 08:28) (18 - 18)  SpO2: 96% (04-06-25 @ 08:28) (95% - 99%)  Wt(kg): --  I&O's Summary        Appearance: NAD  HEENT:  Dry oral mucosa, PERRL, EOMI	  Lymphatic: No lymphadenopathy  Cardiovascular: Normal S1 S2, No JVD, No murmurs, No edema  Respiratory: Lungs clear to auscultation	  Psychiatry: A & O x 3, Mood & affect appropriate  Skin: No rashes, No ecchymoses, No cyanosis	  Neurologic: Non-focal  GI/Abd: Soft, obese, nontender. Dressing to LLQ C/D/I. Suprapubic catheter in place  Ext: Palp femoral pulses bilat. BLE atrophic, minimal dorsi/plantarflexion bilaterally. Sensation grossly intact. LLE edematous compared to R, erythema to right toes/forefoot. mottling of right toes/forefoot/heel  LLE:  small superficial abrasion, +edema and +ecchymosis over L knee  +TTP over L knee, no TTP along remainder of extremity; compartments soft  Limited ROM at knee 2/2 pain  No gross varus/valgus laxity, but assessment limited 2/2 pain  Motor: TA/EHL/GS/FHL intact  Sensory: DP/SP/Tib/Jordan/Saph SILT  +DP pulse (symmetric relative to contralateral side), WWP   pressure wound from the LLE immobilizer posteriorly proximal to the knee.  Vascular: Peripheral pulses palpable 2+ bilaterally      LABS:    CARDIAC MARKERS:                                10.9   6.03  )-----------( 269      ( 06 Apr 2025 09:05 )             35.7     04-06    135  |  101  |  31[H]  ----------------------------<  73  4.3   |  20[L]  |  3.36[H]    Ca    7.4[L]      06 Apr 2025 09:05      proBNP:   Lipid Profile:   HgA1c:   TSH:             TELEMETRY: 	    ECG:  	  RADIOLOGY:   DIAGNOSTIC TESTING:  [ ] Echocardiogram:  [ ]  Catheterization:  [ ] Stress Test:    OTHER:

## 2025-04-06 NOTE — CONSULT NOTE ADULT - SUBJECTIVE AND OBJECTIVE BOX
SURGERY CONSULT NOTE    HPI:  74 yo M w/ PMHx of multiple comorbidities including polio, chronic pain with multiple sequela including LE weakness requiring wheelchair attributed to polio since 18 months of age, scoliosis, neurogenic bladder s/p suprapubic catheter, and colostomy s/p iatrogenic rectal rupture injury, reversal of colostomy, anemia, erectile dysfunction, multiple fractures including patella and femoral condyle with gianluca placement of the lower extremities, deep venous thrombosis, elevated hemidiaphragm, hyperlipidemia, bacterial sepsis, electrolyte imbalance, shoulder surgery, cataract surgery, tremors, PAD who initially presented to ED after fall from wheelchair.     Patient now ESRD on HD.     Patient w/ multiple pressures wounds including sacrum, buttocks, L thigh, L knee, L calf. Wound care previously following, however now w/ progressing sacral wound refusing bedside debridement.     Surgery consulted for evaluation for debridement in the OR and possible diverting ostomy.     Patient is afebrile, HDS. No leukocytosis, off abx. Last CT A/P 3/25 with subcutaneous gas and infiltration tracking along the medial right buttock.         PAST MEDICAL HISTORY:  Polio    Diabetes    Pulmonary hypertension    Hypertension    H/O urinary retention    HLD (hyperlipidemia)    BPH with obstruction/lower urinary tract symptoms    Type 2 diabetes mellitus    Erectile dysfunction    Bladder outlet obstruction    Presence of suprapubic catheter    Colostomy status    DVT, lower extremity    H/O cardiac murmur        PAST SURGICAL HISTORY:  No significant past surgical history    S/P colostomy    Suprapubic catheter    H/O total hip arthroplasty, right    Presence of internal fixation gianluca in upper extremity    S/P ORIF (open reduction internal fixation) fracture    S/P cataract surgery    H/O shoulder surgery        MEDICATIONS:  acetaminophen     Tablet .. 650 milliGRAM(s) Oral every 6 hours PRN  albuterol/ipratropium for Nebulization 3 milliLiter(s) Nebulizer every 6 hours  aspirin  chewable 81 milliGRAM(s) Oral daily  atorvastatin 40 milliGRAM(s) Oral at bedtime  bacitracin/polymyxin B Ointment 1 Application(s) Topical two times a day  benzocaine/menthol Lozenge 1 Lozenge Oral three times a day PRN  bisacodyl 5 milliGRAM(s) Oral every 12 hours PRN  chlorhexidine 2% Cloths 1 Application(s) Topical daily  clopidogrel Tablet 75 milliGRAM(s) Oral daily  Dakins Solution - 1/4 Strength 1 Application(s) Topical every 8 hours  epoetin aleah-epbx (RETACRIT) Injectable 72226 Unit(s) IV Push <User Schedule>  heparin   Injectable 5000 Unit(s) SubCutaneous every 8 hours  HYDROmorphone  Injectable 2 milliGRAM(s) IV Push every 4 hours PRN  hydrOXYzine hydrochloride 25 milliGRAM(s) Oral three times a day  ibuprofen  Tablet. 400 milliGRAM(s) Oral every 12 hours  lactobacillus acidophilus 1 Tablet(s) Oral two times a day with meals  mupirocin 2% Ointment 1 Application(s) Topical <User Schedule>  Nephro-margaret 1 Tablet(s) Oral daily  oxyCODONE    IR 10 milliGRAM(s) Oral every 4 hours PRN  oxyCODONE  ER Tablet 30 milliGRAM(s) Oral every 12 hours  pantoprazole    Tablet 40 milliGRAM(s) Oral before breakfast  polyethylene glycol 3350 17 Gram(s) Oral daily  povidone iodine 10% Solution 1 Application(s) Topical two times a day  senna 2 Tablet(s) Oral at bedtime  sodium chloride 0.9% lock flush 10 milliLiter(s) IV Push every 1 hour PRN      ALLERGIES:  No Known Allergies      VITALS & I/Os:  Vital Signs Last 24 Hrs  T(C): 36.6 (06 Apr 2025 08:28), Max: 36.7 (06 Apr 2025 00:25)  T(F): 97.9 (06 Apr 2025 08:28), Max: 98.1 (06 Apr 2025 00:25)  HR: 82 (06 Apr 2025 08:28) (81 - 87)  BP: 103/57 (06 Apr 2025 08:28) (100/66 - 121/74)  RR: 18 (06 Apr 2025 08:28) (18 - 18)  SpO2: 96% (06 Apr 2025 08:28) (95% - 96%)    Parameters below as of 06 Apr 2025 08:28  Patient On (Oxygen Delivery Method): room air      PHYSICAL EXAM:  General: No acute distress, AOx3  Respiratory: Nonlabored  : suprapubic cath   Abdominal: Soft, nondistended, nontender. No rebound or guarding. No organomegaly, no palpable mass.  Extremities: B/L LE dry gangrene to toes  Wounds: Sacrum - [id number] wound w/ overlying soft, black eschar w/ surrounding red granulation tissue. No purulence, surrounding fluctuance. Inferior edge of wound ~5cm from perineal area. No stool noted in wound   Also w/ superficial B/L buttock, R ischial, LLE wounds         LABS:                        10.9   6.03  )-----------( 269      ( 06 Apr 2025 09:05 )             35.7     04-06    135  |  101  |  31[H]  ----------------------------<  73  4.3   |  20[L]  |  3.36[H]    Ca    7.4[L]      06 Apr 2025 09:05      Urinalysis Basic - ( 06 Apr 2025 09:05 )    Color: x / Appearance: x / SG: x / pH: x  Gluc: 73 mg/dL / Ketone: x  / Bili: x / Urobili: x   Blood: x / Protein: x / Nitrite: x   Leuk Esterase: x / RBC: x / WBC x   Sq Epi: x / Non Sq Epi: x / Bacteria: x

## 2025-04-06 NOTE — PROGRESS NOTE ADULT - SUBJECTIVE AND OBJECTIVE BOX
NEPHROLOGY     Patient seen and examined on room air, reports of ongoing pain, denies sob, in no acute distress.     MEDICATIONS  (STANDING):  albuterol/ipratropium for Nebulization 3 milliLiter(s) Nebulizer every 6 hours  aspirin  chewable 81 milliGRAM(s) Oral daily  atorvastatin 40 milliGRAM(s) Oral at bedtime  chlorhexidine 2% Cloths 1 Application(s) Topical daily  clopidogrel Tablet 75 milliGRAM(s) Oral daily  Dakins Solution - 1/4 Strength 1 Application(s) Topical every 8 hours  epoetin aleah-epbx (RETACRIT) Injectable 50633 Unit(s) IV Push <User Schedule>  heparin   Injectable 5000 Unit(s) SubCutaneous every 8 hours  hydrOXYzine hydrochloride 25 milliGRAM(s) Oral three times a day  ibuprofen  Tablet. 400 milliGRAM(s) Oral every 12 hours  lactobacillus acidophilus 1 Tablet(s) Oral two times a day with meals  mupirocin 2% Ointment 1 Application(s) Topical <User Schedule>  Nephro-margaret 1 Tablet(s) Oral daily  oxyCODONE  ER Tablet 30 milliGRAM(s) Oral every 12 hours  pantoprazole    Tablet 40 milliGRAM(s) Oral before breakfast  polyethylene glycol 3350 17 Gram(s) Oral daily  povidone iodine 10% Solution 1 Application(s) Topical two times a day  senna 2 Tablet(s) Oral at bedtime    VITALS:  T(C): , Max: 36.7 (04-06-25 @ 00:25)  T(F): , Max: 98.1 (04-06-25 @ 00:25)  HR: 82 (04-06-25 @ 08:28)  BP: 103/57 (04-06-25 @ 08:28)  RR: 18 (04-06-25 @ 08:28)  SpO2: 96% (04-06-25 @ 08:28)    PHYSICAL EXAM:  Constitutional: alert, NAD  HEENT: NCAT, DMM  Neck: Supple, No JVD  Respiratory: CTA-b/l  Cardiovascular: RRR s1s2, no m/r/g  Gastrointestinal: BS+, soft, NT/ND  : (+)suprapubic cath  Extremities: tr b/l LE edema  Neurological: reduced generalized strength  Back: no CVAT b/l  Skin: (+)cyanotic changes b/l feet  Access: RIJ tunneled cath      LABS:                        10.9   6.03  )-----------( 269      ( 06 Apr 2025 09:05 )             35.7     04-06    135  |  101  |  31[H]  ----------------------------<  73  4.3   |  20[L]  |  3.36[H]    Ca    7.4[L]      06 Apr 2025 09:05

## 2025-04-06 NOTE — PROGRESS NOTE ADULT - SUBJECTIVE AND OBJECTIVE BOX
INTERVAL HPI/OVERNIGHT EVENTS:     no events overnight       acetaminophen     Tablet .. 650 milliGRAM(s) Oral every 6 hours PRN  albuterol/ipratropium for Nebulization 3 milliLiter(s) Nebulizer every 6 hours  aspirin  chewable 81 milliGRAM(s) Oral daily  atorvastatin 40 milliGRAM(s) Oral at bedtime  benzocaine/menthol Lozenge 1 Lozenge Oral three times a day PRN  bisacodyl 5 milliGRAM(s) Oral every 12 hours PRN  chlorhexidine 2% Cloths 1 Application(s) Topical daily  clopidogrel Tablet 75 milliGRAM(s) Oral daily  Dakins Solution - 1/4 Strength 1 Application(s) Topical every 8 hours  epoetin aleah-epbx (RETACRIT) Injectable 25247 Unit(s) IV Push <User Schedule>  heparin   Injectable 5000 Unit(s) SubCutaneous every 8 hours  HYDROmorphone   Tablet 4 milliGRAM(s) Oral every 4 hours PRN  hydrOXYzine hydrochloride 25 milliGRAM(s) Oral three times a day  ibuprofen  Tablet. 400 milliGRAM(s) Oral every 12 hours  lactobacillus acidophilus 1 Tablet(s) Oral two times a day with meals  mupirocin 2% Ointment 1 Application(s) Topical <User Schedule>  Nephro-margaret 1 Tablet(s) Oral daily  oxyCODONE    IR 10 milliGRAM(s) Oral every 4 hours PRN  oxyCODONE  ER Tablet 30 milliGRAM(s) Oral every 12 hours  pantoprazole    Tablet 40 milliGRAM(s) Oral before breakfast  polyethylene glycol 3350 17 Gram(s) Oral daily  povidone iodine 10% Solution 1 Application(s) Topical two times a day  senna 2 Tablet(s) Oral at bedtime  sodium chloride 0.9% lock flush 10 milliLiter(s) IV Push every 1 hour PRN          Hemoglobin: 8.8 g/dL (04-01 @ 08:37)            Creatinine Trend: 2.87<--, 3.63<--, 2.52<--, 3.21<--, 4.04<--, 3.43<--    COAGS:           T(C): 36.9 (04-05-25 @ 00:29), Max: 36.9 (04-05-25 @ 00:29)  HR: 75 (04-05-25 @ 00:29) (60 - 87)  BP: 96/60 (04-05-25 @ 00:29) (96/60 - 104/70)  RR: 18 (04-05-25 @ 00:29) (18 - 18)  SpO2: 96% (04-05-25 @ 00:29) (92% - 96%)  Wt(kg): --    I&O's Summary    04 Apr 2025 07:01  -  05 Apr 2025 07:00  --------------------------------------------------------  IN: 480 mL / OUT: 500 mL / NET: -20 mL        Allergies    No Known Allergies    Intolerances        Review of Systems:    General:  No wt loss, fevers, chills, night sweats, fatigue   Eyes:  Good vision, no reported pain  ENT:  No sore throat, pain, runny nose, dysphagia  CV:  No pain, palpitations, hypo/hypertension  Resp:  No dyspnea, cough, tachypnea, wheezing  GI:  No pain, No nausea, No vomiting, No diarrhea, No constipation, No weight loss, No fever, No pruritis, No rectal bleeding, No melena, No dysphagia  :  No pain, bleeding, incontinence, nocturia  Muscle:  No pain, weakness  Neuro:  No weakness, tingling, memory problems  Psych:  No fatigue, insomnia, mood problems, depression  Endocrine:  No polyuria, polydypsia, cold/heat intolerance  Heme:  No petechiae, ecchymosis, easy bruisability  Skin:  No rash, tattoos, scars, edema         PHYSICAL EXAM:    Constitutional: NAD  HEENT: EOMI, throat clear  Neck: No LAD, supple  Respiratory: CTA and P  Cardiovascular: S1 and S2, RRR, no M  Gastrointestinal: BS+, soft, NT/ND, neg HSM,  Extremities: No peripheral edema, neg clubbing, cyanosis  Vascular: 2+ peripheral pulses  Neurological: A/O   Psychiatric: Normal mood, normal affect  Skin: No rashes

## 2025-04-07 NOTE — PROGRESS NOTE ADULT - SUBJECTIVE AND OBJECTIVE BOX
Patient is a 75y old  Male who presents with a chief complaint of ESRD requiring HD, uremia (07 Apr 2025 10:52)      SUBJECTIVE / OVERNIGHT EVENTS: ptn complaining his pain meds are not covering his pain completely. yesterday he was talking about death a lot and how he wished to die. had psych eval today. today he is doing better emotionally, not talking about drath and wants to cont treatment. he is tolerating HD, he finished ABx for PNA last week. his wounds are the primary reason of admission at this point.  I also called palliaitive consult.   He was seen by surgery for debridement of sacral decub, ptn is refusing diverting colostomy. on admission ptn had intact skin but did show primary stages of pressure injury in the sacral region. ptn didnt allow us to turn him 2/2 pain in LLE for several days, despite our concern of further pressure injury on the sacrum. once he was turned we discovered skin break and started wound care. again he would not allow us to turn him. he said the wound is ok, its scabbed, Dee Dee( his wife) is handling it. I called wound care to see him, wound care discovered that the area had necrosis, not scabs, debulking at the bedside was performed, ptn complained it was extremely painful. ptn was seen by wound care attending and recommended debridement in the OR under anesthesia. it is planned for 4/9. it was also recommended he should have a diverting colostomy. ptn is refusing it. HCP his daughter Cori aware.     MEDICATIONS  (STANDING):  albuterol/ipratropium for Nebulization 3 milliLiter(s) Nebulizer every 6 hours  aspirin  chewable 81 milliGRAM(s) Oral daily  atorvastatin 40 milliGRAM(s) Oral at bedtime  bacitracin/polymyxin B Ointment 1 Application(s) Topical two times a day  chlorhexidine 2% Cloths 1 Application(s) Topical daily  clopidogrel Tablet 75 milliGRAM(s) Oral daily  Dakins Solution - 1/4 Strength 1 Application(s) Topical every 8 hours  epoetin aleah-epbx (RETACRIT) Injectable 02896 Unit(s) IV Push <User Schedule>  heparin   Injectable 5000 Unit(s) SubCutaneous every 8 hours  hydrOXYzine hydrochloride 25 milliGRAM(s) Oral three times a day  ibuprofen  Tablet. 400 milliGRAM(s) Oral every 12 hours  lactobacillus acidophilus 1 Tablet(s) Oral two times a day with meals  mupirocin 2% Ointment 1 Application(s) Topical <User Schedule>  Nephro-margaret 1 Tablet(s) Oral daily  oxyCODONE  ER Tablet 30 milliGRAM(s) Oral every 12 hours  pantoprazole    Tablet 40 milliGRAM(s) Oral before breakfast  polyethylene glycol 3350 17 Gram(s) Oral daily  povidone iodine 10% Solution 1 Application(s) Topical two times a day  senna 2 Tablet(s) Oral at bedtime    MEDICATIONS  (PRN):  acetaminophen     Tablet .. 650 milliGRAM(s) Oral every 6 hours PRN Temp greater or equal to 38C (100.4F), Mild Pain (1 - 3)  benzocaine/menthol Lozenge 1 Lozenge Oral three times a day PRN Sore Throat  bisacodyl 5 milliGRAM(s) Oral every 12 hours PRN Constipation  HYDROmorphone  Injectable 2 milliGRAM(s) IV Push every 4 hours PRN Severe Pain (7 - 10)  oxyCODONE    IR 10 milliGRAM(s) Oral every 4 hours PRN Moderate Pain (4 - 6)  sodium chloride 0.9% lock flush 10 milliLiter(s) IV Push every 1 hour PRN Pre/post blood products, medications, blood draw, and to maintain line patency      Vital Signs Last 24 Hrs  T(F): 96.8 (04-07-25 @ 10:38), Max: 98 (04-07-25 @ 04:23)  HR: 88 (04-07-25 @ 10:38) (80 - 88)  BP: 111/42 (04-07-25 @ 10:38) (110/60 - 146/78)  RR: 18 (04-07-25 @ 10:38) (18 - 18)  SpO2: 92% (04-07-25 @ 10:38) (92% - 96%)  Telemetry:   CAPILLARY BLOOD GLUCOSE        I&O's Summary    07 Apr 2025 07:01  -  07 Apr 2025 15:58  --------------------------------------------------------  IN: 0 mL / OUT: 500 mL / NET: -500 mL        PHYSICAL EXAM:  GENERAL: NAD, well-developed  HEAD:  Atraumatic, Normocephalic  EYES: EOMI, PERRLA, conjunctiva and sclera clear  NECK: Supple, No JVD  CHEST/LUNG: Clear to auscultation bilaterally; No wheeze  HEART: Regular rate and rhythm; No murmurs, rubs, or gallops  ABDOMEN: Soft, Nontender, Nondistended; Bowel sounds present  EXTREMITIES:  2+ Peripheral Pulses, No clubbing, cyanosis, or edema  PSYCH: AAOx3  NEUROLOGY: non-focal  SKIN: No rashes or lesions    LABS:                        9.3    7.32  )-----------( 284      ( 07 Apr 2025 08:46 )             30.2     04-07    134[L]  |  98  |  42[H]  ----------------------------<  95  4.6   |  22  |  3.94[H]    Ca    7.2[L]      07 Apr 2025 08:46            Urinalysis Basic - ( 07 Apr 2025 08:46 )    Color: x / Appearance: x / SG: x / pH: x  Gluc: 95 mg/dL / Ketone: x  / Bili: x / Urobili: x   Blood: x / Protein: x / Nitrite: x   Leuk Esterase: x / RBC: x / WBC x   Sq Epi: x / Non Sq Epi: x / Bacteria: x        RADIOLOGY & ADDITIONAL TESTS:    Imaging Personally Reviewed:    Consultant(s) Notes Reviewed:      Care Discussed with Consultants/Other Providers:

## 2025-04-07 NOTE — PROGRESS NOTE ADULT - SUBJECTIVE AND OBJECTIVE BOX
Date of Service: 04-07-25 @ 10:50    Patient is a 75y old  Male who presents with a chief complaint of ESRD requiring HD, uremia (07 Apr 2025 10:09)      Any change in ROS:   Seen in HD  No new respiratory events overnight. Denies SOB/CP.    MEDICATIONS  (STANDING):  albuterol/ipratropium for Nebulization 3 milliLiter(s) Nebulizer every 6 hours  aspirin  chewable 81 milliGRAM(s) Oral daily  atorvastatin 40 milliGRAM(s) Oral at bedtime  bacitracin/polymyxin B Ointment 1 Application(s) Topical two times a day  chlorhexidine 2% Cloths 1 Application(s) Topical daily  clopidogrel Tablet 75 milliGRAM(s) Oral daily  Dakins Solution - 1/4 Strength 1 Application(s) Topical every 8 hours  epoetin aleah-epbx (RETACRIT) Injectable 53241 Unit(s) IV Push <User Schedule>  heparin   Injectable 5000 Unit(s) SubCutaneous every 8 hours  hydrOXYzine hydrochloride 25 milliGRAM(s) Oral three times a day  ibuprofen  Tablet. 400 milliGRAM(s) Oral every 12 hours  lactobacillus acidophilus 1 Tablet(s) Oral two times a day with meals  mupirocin 2% Ointment 1 Application(s) Topical <User Schedule>  Nephro-margaret 1 Tablet(s) Oral daily  oxyCODONE  ER Tablet 30 milliGRAM(s) Oral every 12 hours  pantoprazole    Tablet 40 milliGRAM(s) Oral before breakfast  polyethylene glycol 3350 17 Gram(s) Oral daily  povidone iodine 10% Solution 1 Application(s) Topical two times a day  senna 2 Tablet(s) Oral at bedtime    MEDICATIONS  (PRN):  acetaminophen     Tablet .. 650 milliGRAM(s) Oral every 6 hours PRN Temp greater or equal to 38C (100.4F), Mild Pain (1 - 3)  benzocaine/menthol Lozenge 1 Lozenge Oral three times a day PRN Sore Throat  bisacodyl 5 milliGRAM(s) Oral every 12 hours PRN Constipation  HYDROmorphone  Injectable 2 milliGRAM(s) IV Push every 4 hours PRN Severe Pain (7 - 10)  oxyCODONE    IR 10 milliGRAM(s) Oral every 4 hours PRN Moderate Pain (4 - 6)  sodium chloride 0.9% lock flush 10 milliLiter(s) IV Push every 1 hour PRN Pre/post blood products, medications, blood draw, and to maintain line patency    Vital Signs Last 24 Hrs  T(C): 36 (07 Apr 2025 07:18), Max: 36.7 (07 Apr 2025 04:23)  T(F): 96.8 (07 Apr 2025 07:18), Max: 98 (07 Apr 2025 04:23)  HR: 85 (07 Apr 2025 07:18) (80 - 91)  BP: 110/60 (07 Apr 2025 07:18) (106/64 - 146/78)  BP(mean): --  RR: 18 (07 Apr 2025 07:18) (18 - 18)  SpO2: 93% (07 Apr 2025 07:18) (93% - 96%)    Parameters below as of 07 Apr 2025 07:18  Patient On (Oxygen Delivery Method): room air        I&O's Summary        Physical Exam:   GENERAL: NAD, well-groomed, well-developed  HEENT: VINNY/   Atraumatic, Normocephalic  ENMT: No tonsillar erythema, exudates, or enlargement; Moist mucous membranes, Good dentition, No lesions  NECK: Supple, No JVD, Normal thyroid  CHEST/LUNG: Decreased BS   CVS: Regular rate and rhythm; No murmurs, rubs, or gallops  GI: : Soft, Nontender, Nondistended; Bowel sounds present  NERVOUS SYSTEM:  Alert & Oriented X3, Good concentration; Motor Strength 5/5 B/L upper and lower extremities; DTRs 2+ intact and symmetric  EXTREMITIES:  LE wounds   LYMPH: No lymphadenopathy noted  SKIN: No rashes or lesions  ENDOCRINOLOGY: No Thyromegaly  PSYCH: Appropriate    Labs:                              9.3    7.32  )-----------( 284      ( 07 Apr 2025 08:46 )             30.2                         10.9   6.03  )-----------( 269      ( 06 Apr 2025 09:05 )             35.7                         9.3    6.30  )-----------( 258      ( 05 Apr 2025 11:25 )             31.3     04-07    134[L]  |  98  |  42[H]  ----------------------------<  95  4.6   |  22  |  3.94[H]  04-06    135  |  101  |  31[H]  ----------------------------<  73  4.3   |  20[L]  |  3.36[H]  04-05    137  |  101  |  25[H]  ----------------------------<  68[L]  4.3   |  25  |  2.83[H]    Ca    7.2[L]      07 Apr 2025 08:46  Ca    7.4[L]      06 Apr 2025 09:05  Ca    7.5[L]      05 Apr 2025 11:25      CAPILLARY BLOOD GLUCOSE              Urinalysis Basic - ( 07 Apr 2025 08:46 )    Color: x / Appearance: x / SG: x / pH: x  Gluc: 95 mg/dL / Ketone: x  / Bili: x / Urobili: x   Blood: x / Protein: x / Nitrite: x   Leuk Esterase: x / RBC: x / WBC x   Sq Epi: x / Non Sq Epi: x / Bacteria: x        Studies  < from: CT Abdomen and Pelvis w/ IV Cont (03.25.25 @ 13:43) >    ACC: [id number] EXAM:  CT CHEST IC   ORDERED BY:  NELL TOLBERT     ACC: [id number] EXAM:  CT ABDOMEN AND PELVIS IC   ORDERED BY:  NELL TOLBERT     PROCEDURE DATE:  03/25/2025          INTERPRETATION:  CLINICAL INFORMATION: Evaluate for pneumonia. Abdominal   pain.    COMPARISON: CT chest 2/23/2025. CT abdomen and pelvis 2/20/2025.    CONTRAST/COMPLICATIONS:  IV Contrast: Omnipaque 350  90 cc administered   10 cc discarded  Oral Contrast: NONE    PROCEDURE:  CT of the Chest, Abdomen and Pelvis was performed.  Sagittal and coronal reformats were performed.    FINDINGS:  CHEST:  LUNGS AND LARGE AIRWAYS: Patent central airways. Small amount of debris   within the trachea. Right lower lobe consolidative opacities. Additional   consolidative opacities in the left lower lobe, possibly atelectasis. A   few ill-defined nodular opacities in the left lower lobe measuring up to   6 mm.  PLEURA: Small left pleural effusion.  VESSELS: Right IJ approach catheter with tip in the superior cavoatrial   junction. Linear nonocclusive filling defect within the right internal   jugular vein (3:1). Atherosclerotic changes. Coronary artery   calcification.  HEART: Heart size is normal. Aortic valve and mitral annular   calcification. No pericardialeffusion.  MEDIASTINUM AND MARYANNE: No lymphadenopathy.  CHEST WALL AND LOWER NECK: Within normal limits.    ABDOMEN AND PELVIS:  LIVER: Subcentimeter hypodensity in the left lobe that is too small to   characterize.  BILE DUCTS: Normal caliber.  GALLBLADDER: Cholelithiasis.  SPLEEN: Within normal limits.  PANCREAS: Within normal limits.  ADRENALS: Within normal limits.  KIDNEYS/URETERS: Bilateral mild hydroureteronephrosis to the level of the   urinary bladder without evidence of obstructive urolithiasis, not   significantly changed from the prior exam of 2/20/2025. Diffuse   urothelial thickening of the bilateral ureters and collecting systems,   also similar to the previous CT. An exophytic hypodense right renal   lesion measuring 4.1 cm is unchanged.    BLADDER: Underdistended with a suprapubic catheter in place.  REPRODUCTIVE ORGANS: Enlarged prostate. Linear densities in the prostate,   possibly from previous UroLift procedure; correlate with the patient's   surgical history.    BOWEL:Rectal wall thickening. Sigmoid anastomosis. No evidence for bowel   obstruction. Normal appendix.  PERITONEUM/RETROPERITONEUM: Within normal limits.  VESSELS: Atherosclerotic changes. Bilateral common iliac/external iliac   artery stents. Previouslydescribed bilateral external iliac and   bilateral superficial femoral artery occlusions seen on the prior CTA is   not well assessed by this exam.  LYMPH NODES: Small left para-aortic lymph node measuring 1.1 x 0.9 cm,   unchanged.  ABDOMINAL WALL: Suprapubic catheter. Subcutaneous gas and subcutaneous   infiltration tracking through the right medial buttock. No discrete   associated collection within the field-of-view..  BONES: Degenerative changes. Bilateral femoral fixation hardware.    IMPRESSION:    CHEST:  *  Right lower lobe consolidative opacities, which may represent   pneumonia. Additional consolidative opacities in the left lower lobe,   potentially atelectasis or infection. A few ill-defined nodular opacities   in the left lower lobe measuring up to 6 mm may also be   infectious/inflammatory in etiology. Suggest continued attention on   follow-up imaging.  *  Small amount of debris in the trachea.  *  Small left pleural effusion.  *  Linear nonocclusive filling defect within the right internal jugular   vein, which may represent residual fibrin sheath or nonocclusive thrombus.    ABDOMEN AND PELVIS:  *  Subcutaneous gas and infiltration tracking along the medial right   buttock, potentially related to a decubitus wound with gas tracking from   the external environment. A necrotizing airforming infection is also a   possibility. Correlate with physical exam.  *  Rectal wall thickening, suggestive of proctitis.  *  Bilateral mild hydroureteronephrosis to the level of the urinary   bladder without evidence of obstructive urolithiasis, and not   significantly changed since 2/20/2025.  *  Diffuse urothelial thickening of the bilateral renal collecting   systems and ureters, which may be seen with an ascending urinary tract   infection or inflammation, similar to the previous CT 2/20/2025.    Findings were discussed with Dr. NELL TOLBERT 3/25/2025 4:30 PM by   Dr. Calzada.    --- End of Report ---      < end of copied text >

## 2025-04-07 NOTE — PROGRESS NOTE ADULT - ASSESSMENT
75 year old male with CKD, sp polio, paraplegia with associated neurogenic bladder s/p suprapubic catheter who was admitted s/p fall on 2/16.   CKD - ESRD, now s/p DEYA [place] 2/17, on HD  2/20 s/p RRT for tremors and confusion -- pt with chronic tremors although notably worse  2/20 CXR with clear lungs   SPC site with chronic/stable inflammatory changes, no drainage - no sign of SSTI  CTA abd with occlusion of b/l external iliac arteries and b/l superficial femoral arteries with distal reconstitution at popliteal arteries; patent three vessel runoff of RLE with 2 vessel runoff of LLE with occlusion of L mid anterior tibial artery with distal reconstitution  Vascular following for worsening PAD with worsening ischemic changes, necrotic toes   3/3 s/p RLE angiogram b/l iliac artery stents   mental status at baseline    3/16 no tenderness at suprapubic catheter site, no visible erythema at catheter site on exam  UA w/ many epithelial cells, Ucx negative  s/p zosyn 3/14-3/16  3/19 SPC site remains without erythema but now noted with some tan crusting on dressing  3/23 reporting vomiting x5 episodes, no diarrhea or other focal sx  Treated for possible SPC site infection with bactrim 3/19-3/25  3/25 CT Chest noted RLL consolidative opacities, LLL opacities - pneumonia vs atelectasis, few ill defined nodular opacities in LLL infectious vs inflammatory  3/25 CTAP with subcutaneous gas and infiltration tracking along medial R buttock, possibly related to decubitus wound with gas tracking from the external environment, possible necrotizing air forming infection; rectal wall thickening suggestive of proctitis; b/l mild hydroureteronephrosis to level of bladder with no obstruction and diffuse urothelial thickening of b/l renal collecting systems and ureters similar to 2/20  MRSA screen has been negative   pt with no resp symptoms- no cough, dyspnea or pain, may have aspirated with vomiting earlier, saturating well on RA  3/26 s/p bedside debridement of L knee wound  as dry eschar  from wound edges without drainage and bedside excisional debridement of unstageable sacrum to left buttock into perineum evolving unstageable pressure injury through necrotic dermis into nonviable subcutaneous tissues, debulking debridement of necrotic tissue done by Wound care; other wounds noted including L calf to ankle wound with liquefaction necrotic drainage; R ischium wound with moist pink granular tissue and L buttock fading DTI  3/30 overnight febrile to 100.9F ?inflammatory -- no further fevers noted, WBC wnl, was on meropenem   dry cough noted, COVID/Flu/RSV negative  loose stools per pt after bowel regimen--no diarrhea, some nausea with no vomiting   Completed meropenem x10d course on 4/3   -sacral wound with eschar with surrounding red granulation tissue, noted no obv acute infection, no sig purulence, no sig surrounding erythema noted, remains afebrile, WBC wnl, nontoxic appearing   -patient refused bedside debridement, ok with going to OR for debridement with surgery, noted likely defer diverting ostomy pending debridement     Recommendations:   Surgery following - planning for OR for debridement of sacral wound on Wednesday 4/9  -please send cultures/path   Continue off antibiotics for now   Monitor temps/WBC  Continue local wound care, nutrition, offloading as able  HD per renal    SPC site dressing change and care   Continue rest of care per primary team     Bridgette Ramirez M.D.  [facility]  Available on [employer] TEAMS - *PREFERRED*  [phone]  After 5pm on weekdays and all day on weekends - please call [phone]     Thank you for consulting us and involving us in the management of this patients case. In addition to reviewing history, imaging, documents, labs, microbiology, took into account antibiotic stewardship, local antibiogram and infection control strategies and potential transmission issues at time of treatment decision making process.

## 2025-04-07 NOTE — BH CONSULTATION LIAISON ASSESSMENT NOTE - NSBHATTESTCOMMENTATTENDFT_PSY_A_CORE
Patient is a 75 year old , domiciled, retired man with no known past psychiatric history, and a medical history significant for polio (wheelchair bound), ESRD (newly on HD during this admission), BPH, DVT, HTN, HLD, neurogenic bladder, and iatrogenic rectal rupture s/p colostomy 2023 who is currently admitted after presenting with multiple falls, for initiation of HD. Patient has been admitted since February 16, 2025, and admission has been complicated by finding of femoral condyle fracture, sacral wound requiring IV abx, and difficulty managing pain. Psychiatry is consulted for safety evaluation after patient made suicidal statements to his attending physician, stating he wants to die and no longer cares about anything. On exam, patient was surprised and displeased to see psychiatry, and minimally cooperative with the exam. He denies having any suicidal thoughts, intent, or plan, and denies having a desire to die. He is dismissive, stating he "possibly said something while I had a lot of medication in me". Insists that he didn't survive polio and everything else in his life just to kill himself now. He does express a lot of frustration about his medical care, feeling that the many consulting services are focused on their own issue and don't know what else is going on, and there is no cohesiveness to it, resulting in him being told conflicting things by different services. He denies feeling depression or anxious, and does not want any mental health services. Denies having symptoms of psychosis. Participates minimally in cognitive exam - oriented to being in a hospital but thinks it is [facility], oriented to it being the beginning of April, and intentionally provides the wrong year with a wink, but never provides the correct answer. Refuses to participate in attention tasks. Therefore unable to rule out delirium. Patient's wife entered the room during the evaluation, and also expressed no concern that patient is suicidal, laughing at the suggestion. Based on current evaluation and available information, it is likely that patient is very frustrated and discouraged about his prolonged hospitalization, and perceived lack of coordination of care amongst providers, and possible that he is depressed, but he does not appear to be acutely suicidal or at risk of harm to himself or others. He does not meet criteria for involuntary care, and does not wish to have any mental health treatment at this time.

## 2025-04-07 NOTE — BH CONSULTATION LIAISON ASSESSMENT NOTE - NSBHCHARTREVIEWLAB_PSY_A_CORE FT
04-07    134[L]  |  98  |  42[H]  ----------------------------<  95  4.6   |  22  |  3.94[H]    Ca    7.2[L]      07 Apr 2025 08:46    CBC Full  -  ( 07 Apr 2025 08:46 )  WBC Count : 7.32 K/uL  RBC Count : 3.43 M/uL  Hemoglobin : 9.3 g/dL  Hematocrit : 30.2 %  Platelet Count - Automated : 284 K/uL  Mean Cell Volume : 88.0 fl  Mean Cell Hemoglobin : 27.1 pg  Mean Cell Hemoglobin Concentration : 30.8 g/dL  Auto Neutrophil # : x  Auto Lymphocyte # : x  Auto Monocyte # : x  Auto Eosinophil # : x  Auto Basophil # : x  Auto Neutrophil % : x  Auto Lymphocyte % : x  Auto Monocyte % : x  Auto Eosinophil % : x  Auto Basophil % : x

## 2025-04-07 NOTE — BH CONSULTATION LIAISON ASSESSMENT NOTE - NSBHCONSULTRECOMMENDOTHER_PSY_A_CORE FT
- No indication for inpatient psychiatric admission  - Patient declines mental health treatment at this time.

## 2025-04-07 NOTE — PROGRESS NOTE ADULT - ASSESSMENT
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 He is well known to me from multiple recent admisions  s/p admissions at [facility] 12/20-12/25/24 and 2/4-2/7 with SONDRA on CKD with associated uremic symptoms.   At the last admission he had expressed strong interest to try to hold off with HD intiation but he has been getting worse clinically at home.  His SONDRA and uremic symptoms significantly improved after the first admission; they improved a to mild extent during the 2nd admission to the point where he was able to be discharged without HD. Since then, however, he has worsened further.   He has been suffering from progressively worsening diffuse pruritus, loss of appetite, and generalized weakness and falls.   His nephrologist and PCP has been speaking with him and his family over the past few days,   The initial plan was that he would present to the [facility] ER Monday 2/17 (ie tomorrow) for HD initiation. Today, however, he fell and sustained trauma to his knee. Given the fall and concern for knee fracture, he presented to the ER today. multiple xrays show no Fractures.      CKD5, uremia, requiring initiation of HD  Rash, seborrheic dermatitis  Pruritis  Anemia  PAD  SP catheter, chronic  Left inferior pole acute on chronic patella Fx    plan  - HD via R subclavian permacath  - 4/7: ptn complaining his pain meds are not covering his pain completely. yesterday he was talking about death a lot and how he wished to die. had psych eval today. today he is doing better emotionally, not talking about drath and wants to cont treatment. he is tolerating HD, he finished ABx for PNA last week. his wounds are the primary reason of admission at this point.  I also called palliaitive consult.   He was seen by surgery for debridement of sacral decub, ptn is refusing diverting colostomy. on admission ptn had intact skin but did show primary stages of pressure injury in the sacral region. ptn didnt allow us to turn him 2/2 pain in LLE for several days, despite our concern of further pressure injury on the sacrum. once he was turned we discovered skin break and started wound care. again he would not allow us to turn him. he said the wound is ok, its scabbed, Dee Dee( his wife) is handling it. I called wound care to see him, wound care discovered that the area had necrosis, not scabs, debulking at the bedside was performed, ptn complained it was extremely painful. ptn was seen by wound care attending and recommended debridement in the OR under anesthesia. it is planned for 4/9. it was also recommended he should have a diverting colostomy. ptn is refusing it. HCP his daughter Yanique aware.   -4/6: spoke w ptn's daughter Yanique on the phone today x 20 min, spoke to ptn , his wife and daughter yanique at the bedside x 60 min today. ptn is agreeing to debridement in the OR, adamantly refusing diverting colostomy he would consider it if post debridement he would not improve and would require another debridement. ptn was seen by urology and ID bc daughter complained increased amount of "pus" at the SPT, both ID and urology feel there is no acute infection/cellulitis, the drainage at the site and expected and not unusual. daughter and wife state ptn didnt have sacral wound when he came. on admission with the aide of his wife we flipped him and he had 1st degree pressure decubiti sacrally, skin was intact.   - 4/5: thorough eval by wound care attending 4/4/25, case d/w wound care attending Dr Dugan. as per her recommendation placed general surgery consult for OR debridement and diverting colostomy. ptn had sacral decubiti prior to admission  -4/4: dr dugan from wound care had an extensive conversation w the ptn and daughter yanique on the phone at bedside. ptn needs general surgery consult for OR debridement of necrotic tissue of the sacral decubiti and diverting colostomy. ptn did not tolerate debridement at the bedside. . completed Abx for PNA  -4/3: ptn's insurance changed to straight medicare/medicaid. now able to go to [facility], but ptn wants to go home. will need equipment set up. this was d/w CM and ACP, daughter Yanique on the phone and wife at bedside today. today is last day of ABx. will transition pain meds to po DIlaudid from IV, will also need outptn pain management F/U, outptn wound care, home PT, need outptn HD set up  -4/2: seen prior to going to HD, no new events. tomorrow completing 10 day course of Meropenem. ongoing need for narcotics 2/2 pain.   -4/1: ptn is frustrated about a prolonged hospitalization but states he feels better overall and once medically cleared he will be ready to go home, not sooner than 4/7. completing ABx on 4/3.   -3/31: at HD today had 1.7 L UF, post HD was hypotense, gave gentle IVF , BP still a bit low. afebrile, loose BMs but not diarrhea, GI PCR not sent, RVP neg. . on Meropenem course for total of 10 days, last day 4/3, would care for decubiti and left knee wound post trauma  -3/30: Tmax 100.9, c/o dry cough and diarrhea but not watery. pain is controlled at the time of visit. wife at bedside. will check RVP panel, GI PCR, sine diarrhea non watery will not order C. Diff. ID to follow  - 3/29: cont meropenem, seen by coverage  - 3/28: pain from decubitus ulcer and Left knee post debridement continues. wound care recs in place. on Meropenem for PNA, pulm and ID following. HD as per renal. HDS  -3/27: ptn is complaining that pain post decubital and left knee debridement has been severe. his daughter is on speaker phone, wife is at bedside. his pain is controlled when meds are administered. he is very uncomfortable due to the pain at the site of decubitus debridement. lyrica helped his pain before but made him drowsy, griffin with gabapentin. he doesnt want them to be resumed. will cont dilaudid, oxycodone, oxycontin, ibuprofen. he is on Meropenem for aspiration PNA/HCAP. [facility] for DVT ppx. seen by vascular cardiology to address R IJ post shiley non occlusive DVT. full AC was not recommended. will check rpt doppler in 3-4 weeks.   - 3/26: Wound care performed bedside debridement of Left knee wound 2/2 lifted eschar, and sacral decubitus. findings c/w possible infection and mainly fat necrosis. cont Meropenem. ID following. podiatry following for gangrenous toes. will give additional single dose IV DIlaudid 2/2 severe pain post debridement.    - 3/25: wife at bedside, daughter on speaker phone at bedside. ptn is pain free at the time of visit and states he wants to cont the pain regiment he is presently on. ct C/A/P revealed: RLL PNA, prob aspiration, stercoral proctitis, decubitus ulcer w possible progression to sacral OM, R IJ nonocclusive DVT. these findings d/w ptn ,his family, wound care, ACP, Vascular, Nephrology, ID, pUlm, GI. meropenem initiated, Bactrim stopped. ptn states when he is cleared for DC, he wants to go home , not to [facility], not to SNF. daughter and wife aware.   -3/24: ptn  was seen by GI for N/V, its not clear the etiology, will obtain Ct C/A/P. ptn states he is coughing up green mucus as well  -3/23:  ptn is in good spirits, says he vomited "spit" this am, but tolerated all meals without vomiting. no abd pain, no undigested food in the vomit. afebrile, no diarrhea, RVP neg. GI consulted.   -3/21-22: ptn feels well.  pain is controlled. still no accepting facility for [facility]  -3/20: ptn states he has severe low back and LLE pain though overall is improving.   -3/19: ptn w tan crusting around SPC , states its painful. started on po Bactrim, renally dosed by ID for cellulitis.   -3/18: LLE paraesthesias are chronic, will d/w CM re dc planning to [facility]    -3/17: ptn states he is lethargic, he has LLE numbness which is new and severe pain at SPC insertion site. this was ID, d/w neuro, renal and vascular. as per ID: no sign of an acute infection recommend CT A/P.   -3/16:  urine cx from 3/15 NTD, zosyn DCed, awaiting dc to [facility], not sure will be able to go to [facility] since there is an insurance coverage conflict. HD as per renal  - 3/15: spoke w ellie's daughter today re denial from [employer] for [facility]. [facility] plan, which [employer] doesnt accept. i recommended to speak  to Cm and to the insurance company to find participating facilities. ptn is stable today. pain and erythema at SP catheter site has resolved today. seen by ID, will cont ZOSYN for now. UCx sent.   - 3/14: suprapubic tube changhed by , ptn has crusting at the insertion site and pain. will start Abx, urine always has sediment, will sent for cx, start Vanco/ZOsyn. ID consult called. check MRSA/MSSA PCR  -3/13: ptn is doing well. ptn has an accepting rehab facility, now pending AUTH.   -3/12: ptn is no longer constipated. doing well off prn IV DIlaudid. awaiting dc to [facility]  -3/11: ptn has no new c/o other than feeling constipated, lactulose ordered. also in preparation for [facility] will dc prn IV DIlaudid, cont prn OXy IR  -3/10:  ptn is awake, alert, wife at bedside, i instructed them to give rehab choices. also awaiting SPC change to be done by urology. pain is controlled  -3/9: seen by PT, will refer to [facility]. choices to be given in AM. this was d/w ptn, daughter, wife.   -3/8:  ptn didnt want to get PT eval done, will reorder. ptn needs [facility] placement. PMNR consult ordered  - 3/7: ptn is alert , pain is controlled, DC planning d/w ptn and HCP, daughter Yanique  -3/6:  ptn is awake, alert, ecchymotic feet look improved, pain is controlled. DC planning to [facility]  3/4-5 - s/p b/l iliac arteries angioplasty and stent placements on 3/3. today has new ecchymoses in b/l LE, concern for arterial insufficiency. vascular aware.. wound from Left knee immobilizer is dressed and healing. pain is controlled.   LEFT UE AVF placement/scheduling will be on outptn basis as per vascular. dc planning to [facility]  - 3/3: ptn is s/p angiogram RLE : b/l iliac arteries w successful angioplasty and stents. in PACU. awaitng HD.  ptn has a pressure wound from the LLE immobilizer posteriorly proximal to the knee. its been on 2/2 knee fracture, applied by ortho and managed by ptn's wife as per ptn's and wife's request , this was d/w nursing and nurse manager ms Ruiz.. immobilizer should be managed by orthopedics and nursing. will keep it off, only keep on when repositioning for care and then remove. wound care to F/U . this was discussed at length w daughter Yanique and wife Dee Dee.   -3/1-3/2: ptn is smiling, pain free, had HD 2/28 and 3/1, awaiting RLE angiogram 3/3, on Heparin drip, euvolemic  -2/28: ptn is lethargic, awaiting RLE angiogram possible fem-fem bypass hopefully on 3/3 pending OR availability, awaiting HD today    -2/27:  plan for angiogram in am with possible angioplasty, possible stent, possible fem-fem bypass. medically cleared for angiogram and possible surgery, stent, angioplasty. pain is controlled. remains on heparin drip as per vascular. HD as per renal    -2/26:  ptn is sleeping, pain is controlled, he was seen by wound care and vascular attending. planning on angiogram 2/2 severe PAD in LE on CTA. he also spoke to HCP. ptn and HCP in agreement. ptn was started on HEPARIN drip as per vascular recs. RIJ permacath done 2/25    - 2/25: ptn states he is hallucinating, but not at present, his MS is at baseline, his pain is controlled, he still needs prn pain meds when he is moved in the bed or for testing or for HD. awaiting Permacath, AVF creation, LE bypass to be d/w Dr. Swenson and the ptn tomorrow. ptn has cardiology clearance  if he opts for. Ptn has sacral decubiti and posterior thigh and buttock decibiti and b/l heel decubiti. Z flow fabienne d/w RN, get wound care consult    -2/24: pain is controlled  if the LLE is immobile and fully extended. doesn't tolerate the knee immobilizer. has a medium condyle and acute on chronic patella fx in LEFT knee. as per vascular ptn will need iliac stent  w a fem-fem bypass, possible axillo-femoral bypass. for this surgery he would need cardiac work up . more pressing surgery is LUE AVF creation,  in IR will arrange for Permacath. for pain control: Motrin 400 mg q12H, Oxy ER 30 mg q12H, Oxy IR 10 for mod pain and dilaudid 2 mg iv for severe pain. still has pruritis though improved, will raise atarax 25 mg to tid. plan of care d/w daughter Yanique, Norma , ptn and his wife. Also d/w renal, card, vascular, ACP    -2/23: Daughter Yanique is the HCP, she and the ptn filled out the paperwork. daily plan and findings d/w her and the ptn. MS is at abseline, c/o b/l LE foot pain and Left Knee pain. xrays of left knee: cannot r/o acute on chronic inferior pole patellar Fx. knee immobilizer is on, awaiting ortho consult. doubt needs any intervention awaiting Ct chest today, will add CT Left knee. ptn states itching has recurred, severe pain has recurred. will resume Atatrax ( 25 mg bid) and Oxycodone ER , but at a lower dose 15 mg q12H. cont prn analgesics. HD as per renal, awaiting Vascular consult f/u re CTA A/L &LE and recs. ptn also wants AVF surgery on this admission. Coughing has stopped    - 2/22: ptn is drowsy but arousable, calmer today, answers questions appropriately. he is pain free, he stopped coughing, he denies having pruritis: will DC:  OXY ER, Atarax, Tessalon perles.     -  2/21: ptn is tearful, a bit confused, coughing, recognizes me and family at bedside. GOC d/w daughter and wife. AMS prob delirium 2/2 acute illness +/- opiods, lyrica, atarac. will lower atarak to tid, dc lyrica, cont Oxy 2/2 ptn has severe LE pain. neuro called for eval. Head ct done yesterday w no acute findings. CTA A/P w LE run fof: severe PAD, vascular to follow up on plan fo care. plan for HD tomorrow and next week place on tiw schedule of MWF. will need tunneled catheter prior to DC and will d/w vascular scheduling of AVF. ptn wants all to be done while inptn. daughter wants to be HCP as per ptn's wishes. she was given paperwork to get it signed and witnessed and will present to nursing thereafter. details of findings and complaints and plan of care d/w daughter and wife. spent 60 min. RVP ordered. start mucinex , tessalon perles, duonebs, get CT chest to r/o PNA. pulm called    -  2/20:  ptn had RRT 2/2 AMS and tremors. no SZ, no LOC. noted to have Na 123( hyponatremia), given 500 cc NS. Gabapentin DCed. ptn had been taking gabapentin at home PTA. Pain is controlled. Pruritis resolved, tolerating HD otherwise.     - Pain management:  cont Oxycodone 10 IR q6H,  DIlaudid prn severe pain 2 mg q4H prn.   - cont outptn meds  - DVT ppx w HSC

## 2025-04-07 NOTE — BH CONSULTATION LIAISON ASSESSMENT NOTE - NSBHCHARTREVIEWVS_PSY_A_CORE FT
Vital Signs Last 24 Hrs  T(C): 36 (07 Apr 2025 10:38), Max: 36.7 (07 Apr 2025 04:23)  T(F): 96.8 (07 Apr 2025 10:38), Max: 98 (07 Apr 2025 04:23)  HR: 88 (07 Apr 2025 10:38) (80 - 91)  BP: 111/42 (07 Apr 2025 10:38) (106/64 - 146/78)  BP(mean): --  RR: 18 (07 Apr 2025 10:38) (18 - 18)  SpO2: 92% (07 Apr 2025 10:38) (92% - 96%)    Parameters below as of 07 Apr 2025 10:38  Patient On (Oxygen Delivery Method): room air

## 2025-04-07 NOTE — PROGRESS NOTE ADULT - SUBJECTIVE AND OBJECTIVE BOX
Seen on HD  Complains of diffuse pain, and generalized malaise  Asks about potential transfer to another hospital       VITAL:  T(C): , Max: 36.7 (04-07-25 @ 04:23)  T(F): , Max: 98 (04-07-25 @ 04:23)  HR: 85 (04-07-25 @ 07:18)  BP: 110/60 (04-07-25 @ 07:18)  BP(mean): --  RR: 18 (04-07-25 @ 07:18)  SpO2: 93% (04-07-25 @ 07:18)  Wt(kg): --      PHYSICAL EXAM:  Constitutional: in poor spirits  HEENT: NCAT, DMM  Neck: Supple, No JVD  Respiratory: CTA-b/l  Cardiovascular: RRR s1s2, no m/r/g  Gastrointestinal: BS+, soft, NT/ND  : (+)suprapubic cath  Extremities: 1+ b/l LE edema  Neurological: reduced generalized strength  Back: no CVAT b/l  Skin: gangrenous changes b/l feet  Access: RIJ tunneled cath-accessed      LABS:                        9.3    7.32  )-----------( 284      ( 07 Apr 2025 08:46 )             30.2     Na(134)/K(4.6)/Cl(98)/HCO3(22)/BUN(42)/Cr(3.94)Glu(95)/Ca(7.2)/Mg(--)/PO4(--)    04-07 @ 08:46  Na(135)/K(4.3)/Cl(101)/HCO3(20)/BUN(31)/Cr(3.36)Glu(73)/Ca(7.4)/Mg(--)/PO4(--)    04-06 @ 09:05  Na(137)/K(4.3)/Cl(101)/HCO3(25)/BUN(25)/Cr(2.83)Glu(68)/Ca(7.5)/Mg(--)/PO4(--)    04-05 @ 11:25      IMPRESSION: 74M w/ polio, neurogenic bladder/suprapubic catheter, 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 On HD now, tolerating    (2)Hyponatremia - receiving high-Na+ bath with HD    (3)Anemia - s/p IV iron; on Retacrit with HD    (4)PAD - s/p LE bypass 3/3/25     (5)CV - acceptable BP/volume    (6)Pain - in association with sacral ulceration, PAD    (7)GOC - failing to thrive/no obvious endpoint here. Patient with persistent pain. If unable to improve further over next couple days, I will look to bring up Palliative therapy/Hospice to the family/patient.    RECOMMEND:   (1)HD today as ordered  (2)Pain control per primary team  (3)If not improved over next couple days, I will reintroduce option of Hospice to patient/family                Rafita Denny MD  [facility]  Office/on call physician: [phone]  Cell (7a-7p): [phone]

## 2025-04-07 NOTE — PROGRESS NOTE ADULT - SUBJECTIVE AND OBJECTIVE BOX
[facility]  SABINO Griffin Y. Patel, S. Shah, G. Casimir  [phone]  ([phone] - weekdays after 5pm and weekends)    Name: BRIAN DEMPSEY  Age/Gender: 75y Male  MRN: [id number]    Interval History:  Patient seen and examined this morning at HD.  States he feels tired, pain currently controlled.   Denies fever, chills or any other new complaints.  Upset about being turned multiples times yesterday to look at wounds.   Notes reviewed  No concerning overnight events  Afebrile   Allergies: No Known Allergies      Objective:  Vitals:   T(F): 96.8 (04-07-25 @ 10:38), Max: 98 (04-07-25 @ 04:23)  HR: 88 (04-07-25 @ 10:38) (80 - 91)  BP: 111/42 (04-07-25 @ 10:38) (106/64 - 146/78)  RR: 18 (04-07-25 @ 10:38) (18 - 18)  SpO2: 92% (04-07-25 @ 10:38) (92% - 96%)  Physical Examination:  General: no acute distress, on HD  HEENT: normocephalic, atraumatic, anicteric  Respiratory: no acc muscle use, breathing comfortably  Cardiovascular: S1 and S2 present  Gastrointestinal: normal appearing, nondistended  Extremities: b/l LE dressing, edema, necrotic toes     Laboratory Studies:  CBC:                       9.3    7.32  )-----------( 284      ( 07 Apr 2025 08:46 )             30.2     WBC Trend:  7.32 04-07-25 @ 08:46  6.03 04-06-25 @ 09:05  6.30 04-05-25 @ 11:25  6.14 04-01-25 @ 08:37    CMP: 04-07    134[L]  |  98  |  42[H]  ----------------------------<  95  4.6   |  22  |  3.94[H]    Ca    7.2[L]      07 Apr 2025 08:46      Creatinine: 3.94 mg/dL (04-07-25 @ 08:46)  Creatinine: 3.36 mg/dL (04-06-25 @ 09:05)  Creatinine: 2.83 mg/dL (04-05-25 @ 11:25)  Creatinine: 2.87 mg/dL (04-01-25 @ 08:37)    Microbiology: reviewed   Radiology: reviewed     Medications:  acetaminophen     Tablet .. 650 milliGRAM(s) Oral every 6 hours PRN  albuterol/ipratropium for Nebulization 3 milliLiter(s) Nebulizer every 6 hours  aspirin  chewable 81 milliGRAM(s) Oral daily  atorvastatin 40 milliGRAM(s) Oral at bedtime  bacitracin/polymyxin B Ointment 1 Application(s) Topical two times a day  benzocaine/menthol Lozenge 1 Lozenge Oral three times a day PRN  bisacodyl 5 milliGRAM(s) Oral every 12 hours PRN  chlorhexidine 2% Cloths 1 Application(s) Topical daily  clopidogrel Tablet 75 milliGRAM(s) Oral daily  Dakins Solution - 1/4 Strength 1 Application(s) Topical every 8 hours  epoetin aleah-epbx (RETACRIT) Injectable 77466 Unit(s) IV Push <User Schedule>  heparin   Injectable 5000 Unit(s) SubCutaneous every 8 hours  HYDROmorphone  Injectable 2 milliGRAM(s) IV Push every 4 hours PRN  hydrOXYzine hydrochloride 25 milliGRAM(s) Oral three times a day  ibuprofen  Tablet. 400 milliGRAM(s) Oral every 12 hours  lactobacillus acidophilus 1 Tablet(s) Oral two times a day with meals  mupirocin 2% Ointment 1 Application(s) Topical <User Schedule>  Nephro-margaret 1 Tablet(s) Oral daily  oxyCODONE    IR 10 milliGRAM(s) Oral every 4 hours PRN  oxyCODONE  ER Tablet 30 milliGRAM(s) Oral every 12 hours  pantoprazole    Tablet 40 milliGRAM(s) Oral before breakfast  polyethylene glycol 3350 17 Gram(s) Oral daily  povidone iodine 10% Solution 1 Application(s) Topical two times a day  senna 2 Tablet(s) Oral at bedtime  sodium chloride 0.9% lock flush 10 milliLiter(s) IV Push every 1 hour PRN    Prior/Completed Antimicrobials:  piperacillin/tazobactam IVPB.  piperacillin/tazobactam IVPB.  piperacillin/tazobactam IVPB.-  piperacillin/tazobactam IVPB.-  trimethoprim   80 mG/sulfamethoxazole 400 mG  trimethoprim  160 mG/sulfamethoxazole 800 mG  vancomycin  IVPB  vancomycin  IVPB

## 2025-04-07 NOTE — BH CONSULTATION LIAISON ASSESSMENT NOTE - NSBHCONSULTFOLLOWAFTERCARE_PSY_A_CORE FT
Pt to follow up with outpt care team    [facility]   [address] [facility] [address] [phone] Pt to follow up with outpt care team    If desired, can contact  [facility]   [address] [facility] [address] [phone]

## 2025-04-07 NOTE — PRE PROCEDURE NOTE - PRE PROCEDURE EVALUATION
Interventional Radiology    HPI: 74y Male with ESRD on new HD now requiring long term HD. Currently has nontunneled HD catheter placed bedside.     Allergies: No Known Allergies    Medications (Abx/Cardiac/Anticoagulation/Blood Products)  alteplase for catheter clearance: 2 milliGRAM(s) Catheter (02-24 @ 17:29)  alteplase for catheter clearance: 2 milliGRAM(s) Catheter (02-24 @ 17:29)  heparin   Injectable: 5000 Unit(s) SubCutaneous (02-24 @ 13:14)    Data:  172.7  90.7  T(C): 36.8  HR: 89  BP: 112/59  RR: 18  SpO2: 98%    Exam  General: No acute distress  Chest: Non labored breathing  Abdomen: Non-distended  Extremities: No swelling, warm    -WBC 6.92 / HgB 8.5 / Hct 29.1 / Plt 196  -Na 136 / Cl 97 / BUN 20 / Glucose 83  -K 3.7 / CO2 28 / Cr 4.28  -ALT 7 / Alk Phos 102 / T.Bili 0.2  -INR1.31    Imaging: Reviewed    Plan: 74y Male presents for tunneled HD catheter placement  -Risks/Benefits/alternatives explained with the patient and/or healthcare proxy and witnessed informed consent obtained.   
Pre-operative Note    - Pre-operative Diagnosis: Sacral decubitus ulcer    - Procedure: Debridement of sacral decubitus ulcer    - Labs:                        9.3    7.32  )-----------( 284      ( 07 Apr 2025 08:46 )             30.2     04-07    134[L]  |  98  |  42[H]  ----------------------------<  95  4.6   |  22  |  3.94[H]    Ca    7.2[L]      07 Apr 2025 08:46      - AICD/Pacemaker Interrogation Date: N/A    - Pregnancy Test: N/A    - Orders:  > NPO at midnight  > Morning Labs: CBC, BMP, coags, type & screen    - Permits:  > Consent in chart.  > Case scheduled with OR.    
Preop Diagnosis: Aorto-iliac disease   Planned Procedure: aortagram, possible b/l iliac stent placement, possible fem-fem bypass  Surgeon: Dr. Swenson     Vital Signs Last 24 Hrs  T(C): 36.8 (02 Mar 2025 07:40), Max: 37.4 (01 Mar 2025 15:12)  T(F): 98.3 (02 Mar 2025 07:40), Max: 99.4 (01 Mar 2025 15:12)  HR: 84 (02 Mar 2025 07:40) (84 - 98)  BP: 108/66 (02 Mar 2025 07:40) (90/51 - 128/76)  BP(mean): --  RR: 18 (02 Mar 2025 07:40) (18 - 18)  SpO2: 90% (02 Mar 2025 07:40) (90% - 96%)    Parameters below as of 02 Mar 2025 07:40  Patient On (Oxygen Delivery Method): room air      Daily     Daily     Pertinent Labs:                        8.9    16.50 )-----------( 220      ( 02 Mar 2025 07:28 )             29.5     03-01    131[L]  |  94[L]  |  33[H]  ----------------------------<  88  4.7   |  22  |  5.69[H]    Ca    8.0[L]      01 Mar 2025 07:01      PTT - ( 01 Mar 2025 14:03 )  PTT:87.9 sec  ------------------------------------------------------------------------------------------------------------------  Echo:  ------------------------------------------------------------------------------------------------------------------    Assessment:  74M with history of HTN, polio with multiple subsequent problems including LE weakness requiring wheelchair, neurogenic bladder s/p suprapubic catheter, and colostomy s/p iatrogenic rectal injury 2/2023, and CKD 5 presenting to the ED with weakness and falls. Admitted for initiation of HD now s/p R IJ shiley placement on 2/17/25 and conversion to tunneled catheter on 2/25. Vascular surgery initially consulted for AVF creation; however, patient's worsening PAD with worsening ischemic changes is the more urgent issue.     Plan:  - OR 3/3/25 for aortagram, possible b/l iliac stent placement, possible fem-fem bypass  with Dr. Swenson   - NPO after midnight, except medications   - IVF while NPO  - Consent signed in chart  - Pre-op Labs at 4:00 am on /: CBC, BMP, Mag, Phos, PT/PTT/INR, T&S   - Cardiac clearance documented on 3/1/25  - Medicine clearance documented on 3/1/25      Surgery  p

## 2025-04-07 NOTE — PROGRESS NOTE ADULT - SUBJECTIVE AND OBJECTIVE BOX
Subjective: Patient seen and examined. No new events except as noted.     REVIEW OF SYSTEMS:    CONSTITUTIONAL: No weakness, fevers or chills  EYES/ENT: No visual changes;  No vertigo or throat pain   NECK: No pain or stiffness  RESPIRATORY: No cough, wheezing, hemoptysis; No shortness of breath  CARDIOVASCULAR: No chest pain or palpitations  GASTROINTESTINAL: No abdominal or epigastric pain. No nausea, vomiting, or hematemesis; No diarrhea or constipation. No melena or hematochezia.  GENITOURINARY: No dysuria, frequency or hematuria  NEUROLOGICAL: No numbness or weakness  SKIN: No itching, burning, rashes, or lesions   All other review of systems is negative unless indicated above.    MEDICATIONS:  MEDICATIONS  (STANDING):  albuterol/ipratropium for Nebulization 3 milliLiter(s) Nebulizer every 6 hours  aspirin  chewable 81 milliGRAM(s) Oral daily  atorvastatin 40 milliGRAM(s) Oral at bedtime  bacitracin/polymyxin B Ointment 1 Application(s) Topical two times a day  chlorhexidine 2% Cloths 1 Application(s) Topical daily  clopidogrel Tablet 75 milliGRAM(s) Oral daily  Dakins Solution - 1/4 Strength 1 Application(s) Topical every 8 hours  epoetin aleah-epbx (RETACRIT) Injectable 22281 Unit(s) IV Push <User Schedule>  heparin   Injectable 5000 Unit(s) SubCutaneous every 8 hours  hydrOXYzine hydrochloride 25 milliGRAM(s) Oral three times a day  ibuprofen  Tablet. 400 milliGRAM(s) Oral every 12 hours  lactobacillus acidophilus 1 Tablet(s) Oral two times a day with meals  mupirocin 2% Ointment 1 Application(s) Topical <User Schedule>  Nephro-margaret 1 Tablet(s) Oral daily  oxyCODONE  ER Tablet 30 milliGRAM(s) Oral every 12 hours  pantoprazole    Tablet 40 milliGRAM(s) Oral before breakfast  polyethylene glycol 3350 17 Gram(s) Oral daily  povidone iodine 10% Solution 1 Application(s) Topical two times a day  senna 2 Tablet(s) Oral at bedtime      PHYSICAL EXAM:  T(C): 36 (04-07-25 @ 07:18), Max: 36.7 (04-07-25 @ 04:23)  HR: 85 (04-07-25 @ 07:18) (80 - 91)  BP: 110/60 (04-07-25 @ 07:18) (106/64 - 146/78)  RR: 18 (04-07-25 @ 07:18) (18 - 18)  SpO2: 93% (04-07-25 @ 07:18) (93% - 96%)  Wt(kg): --  I&O's Summary          Appearance: NAD  HEENT:  Dry oral mucosa, PERRL, EOMI	  Lymphatic: No lymphadenopathy  Cardiovascular: Normal S1 S2, No JVD, No murmurs, No edema  Respiratory: Lungs clear to auscultation	  Psychiatry: A & O x 3, Mood & affect appropriate  Skin: No rashes, No ecchymoses, No cyanosis	  Neurologic: Non-focal  GI/Abd: Soft, obese, nontender. Dressing to Q C/D/I. Suprapubic catheter in place  Ext: Palp femoral pulses bilat. BLE atrophic, minimal dorsi/plantarflexion bilaterally. Sensation grossly intact. LLE edematous compared to R, erythema to right toes/forefoot. mottling of right toes/forefoot/heel  LLE:  small superficial abrasion, +edema and +ecchymosis over L knee  +TTP over L knee, no TTP along remainder of extremity; compartments soft  Limited ROM at knee 2/2 pain  No gross varus/valgus laxity, but assessment limited 2/2 pain  Motor: TA/EHL/GS/FHL intact  Sensory: DP/SP/Tib/Jordan/Saph SILT  +DP pulse (symmetric relative to contralateral side), WWP   pressure wound from the LLE immobilizer posteriorly proximal to the knee.  Vascular: Peripheral pulses palpable 2+ bilaterally        LABS:    CARDIAC MARKERS:                                9.3    7.32  )-----------( 284      ( 07 Apr 2025 08:46 )             30.2     04-07    134[L]  |  98  |  42[H]  ----------------------------<  95  4.6   |  22  |  3.94[H]    Ca    7.2[L]      07 Apr 2025 08:46      proBNP:   Lipid Profile:   HgA1c:   TSH:             TELEMETRY: 	    ECG:  	  RADIOLOGY:   DIAGNOSTIC TESTING:  [ ] Echocardiogram:  [ ]  Catheterization:  [ ] Stress Test:    OTHER:

## 2025-04-07 NOTE — PROGRESS NOTE ADULT - PROBLEM SELECTOR PLAN 7
-Per wound care  -Pain control. -Per wound care  -Pain control  -Plan for OR Wednesday 4/9 for debridement of sacral wound. No pulmonary contraindications to planned procedure.

## 2025-04-07 NOTE — BH CONSULTATION LIAISON ASSESSMENT NOTE - CURRENT MEDICATION
MEDICATIONS  (STANDING):  albuterol/ipratropium for Nebulization 3 milliLiter(s) Nebulizer every 6 hours  aspirin  chewable 81 milliGRAM(s) Oral daily  atorvastatin 40 milliGRAM(s) Oral at bedtime  bacitracin/polymyxin B Ointment 1 Application(s) Topical two times a day  chlorhexidine 2% Cloths 1 Application(s) Topical daily  clopidogrel Tablet 75 milliGRAM(s) Oral daily  Dakins Solution - 1/4 Strength 1 Application(s) Topical every 8 hours  epoetin aleah-epbx (RETACRIT) Injectable 34302 Unit(s) IV Push <User Schedule>  heparin   Injectable 5000 Unit(s) SubCutaneous every 8 hours  hydrOXYzine hydrochloride 25 milliGRAM(s) Oral three times a day  ibuprofen  Tablet. 400 milliGRAM(s) Oral every 12 hours  lactobacillus acidophilus 1 Tablet(s) Oral two times a day with meals  mupirocin 2% Ointment 1 Application(s) Topical <User Schedule>  Nephro-margaret 1 Tablet(s) Oral daily  oxyCODONE  ER Tablet 30 milliGRAM(s) Oral every 12 hours  pantoprazole    Tablet 40 milliGRAM(s) Oral before breakfast  polyethylene glycol 3350 17 Gram(s) Oral daily  povidone iodine 10% Solution 1 Application(s) Topical two times a day  senna 2 Tablet(s) Oral at bedtime    MEDICATIONS  (PRN):  acetaminophen     Tablet .. 650 milliGRAM(s) Oral every 6 hours PRN Temp greater or equal to 38C (100.4F), Mild Pain (1 - 3)  benzocaine/menthol Lozenge 1 Lozenge Oral three times a day PRN Sore Throat  bisacodyl 5 milliGRAM(s) Oral every 12 hours PRN Constipation  HYDROmorphone  Injectable 2 milliGRAM(s) IV Push every 4 hours PRN Severe Pain (7 - 10)  oxyCODONE    IR 10 milliGRAM(s) Oral every 4 hours PRN Moderate Pain (4 - 6)  sodium chloride 0.9% lock flush 10 milliLiter(s) IV Push every 1 hour PRN Pre/post blood products, medications, blood draw, and to maintain line patency

## 2025-04-07 NOTE — BH CONSULTATION LIAISON ASSESSMENT NOTE - SUMMARY
Mr Sandy is a 76 yo M w no known psychiatric history, complicated medical history include polio with neurogenic bladder, iatrogenic rectal rupture s/p colostomy, stage 5 CKD, chronic pain, with multiple recent admissions for SONDRA on CKD who intended to present for initiation of hemodialysis for CKD but initially presented s/p fall with concern for knee fracture. During this hospital course, pt has initiated on HD, clinical course complicated by wound care of lower extremities and pain control. Primary team had concern for pt making suicidal statements, wanting to die. Thus, psychiatry was consulted for assessment of suicidality.    4/7: Pt and collateral without concern for suicidality. Offered psychiatric services to pt but he declined. Made pt aware of getting in contact with psychiatry if wanted in the future. Pt requesting family meeting with all of his teams as he is requesting clarity in his plan. No psychiatric interventions required at this time.    Recs:  - Continue Atarax for itching as is  - no indication for initiation of standing psychotropics    - Obs: routine  - Dispo: pending clinical course, no indication for inpt psych

## 2025-04-07 NOTE — PROGRESS NOTE ADULT - ASSESSMENT
76 yo M w/ PMHx of multiple comorbidities including polio, chronic pain with multiple sequela including LE weakness requiring wheelchair attributed to polio since 18 months of age, scoliosis, neurogenic bladder s/p suprapubic catheter, and colostomy s/p iatrogenic rectal rupture injury, reversal of colostomy, anemia, erectile dysfunction, multiple fractures including patella and femoral condyle with gianluca placement of the lower extremities, deep venous thrombosis, elevated hemidiaphragm, hyperlipidemia, bacterial sepsis, electrolyte imbalance, shoulder surgery, cataract surgery, tremors, PAD who initially presented to ED after fall from wheelchair found to have ESRD now on HD w progressive wounds. Surgery consulted for evaluation of sacral wound.     Recommendations:  - Patient did not want examination of sacral wound this AM  - Currently afebrile, hemodynamically stable, WBC normal at 7/3  - Will plan for OR for sacral wound debridement tomorrow vs. Wednesday  - Consent to be obtained  - Patient will need pre-op labs, NPO after midnight prior to OR  - Continue frequent offloading    Trauma ACS [id number]    ACS/Trauma  [id number] 74 yo M w/ PMHx of multiple comorbidities including polio, chronic pain with multiple sequela including LE weakness requiring wheelchair attributed to polio since 18 months of age, scoliosis, neurogenic bladder s/p suprapubic catheter, and colostomy s/p iatrogenic rectal rupture injury, reversal of colostomy, anemia, erectile dysfunction, multiple fractures including patella and femoral condyle with gianluca placement of the lower extremities, deep venous thrombosis, elevated hemidiaphragm, hyperlipidemia, bacterial sepsis, electrolyte imbalance, shoulder surgery, cataract surgery, tremors, PAD who initially presented to ED after fall from wheelchair found to have ESRD now on HD w progressive wounds. Surgery consulted for evaluation of sacral wound.     Recommendations:  - Patient did not want examination of sacral wound this AM  - Currently afebrile, hemodynamically stable, WBC normal at 7/3  - Plan for OR Wednesday 4/9 for debridement of sacral wound  - Consent to be obtained  - Patient will need to be NPO after midnight on Tuesday and obtain 4AM pre-op labs early Wednesday AM  - Continue frequent offloading    Trauma ACS [id number]    ACS/Trauma  [id number] 76 yo M w/ PMHx of multiple comorbidities including polio, chronic pain with multiple sequela including LE weakness requiring wheelchair attributed to polio since 18 months of age, scoliosis, neurogenic bladder s/p suprapubic catheter, and colostomy s/p iatrogenic rectal rupture injury, reversal of colostomy, anemia, erectile dysfunction, multiple fractures including patella and femoral condyle with gianluca placement of the lower extremities, deep venous thrombosis, elevated hemidiaphragm, hyperlipidemia, bacterial sepsis, electrolyte imbalance, shoulder surgery, cataract surgery, tremors, PAD who initially presented to ED after fall from wheelchair found to have ESRD now on HD w progressive wounds. Surgery consulted for evaluation of sacral wound.     Recommendations:  - Patient did not want examination of sacral wound this AM  - Currently afebrile, hemodynamically stable, WBC normal at 7/3  - Plan for OR Wednesday 4/9 for debridement of sacral wound  - Consent to be obtained  - Patient will need to be NPO after midnight on Tuesday and obtain 4AM pre-op labs early Wednesday AM  - Do not need to hold dual anti-platelets for OR  - Continue frequent offloading    Trauma [id number]    ACS/Trauma  [id number] 74 yo M w/ PMHx of multiple comorbidities including polio, chronic pain with multiple sequela including LE weakness requiring wheelchair attributed to polio since 18 months of age, scoliosis, neurogenic bladder s/p suprapubic catheter, and colostomy s/p iatrogenic rectal rupture injury, reversal of colostomy, anemia, erectile dysfunction, multiple fractures including patella and femoral condyle with gianluca placement of the lower extremities, deep venous thrombosis, elevated hemidiaphragm, hyperlipidemia, bacterial sepsis, electrolyte imbalance, shoulder surgery, cataract surgery, tremors, PAD who initially presented to ED after fall from wheelchair found to have ESRD now on HD w progressive wounds. Surgery consulted for evaluation of sacral wound.     Recommendations:  - Patient did not want examination of sacral wound this AM  - Currently afebrile, hemodynamically stable, WBC normal at 7/3  - Plan for OR tomorrow 4/8 for debridement of sacral wound  - Consent obtained, in chart  - NPO at midnight, 6AM pre-operative labs  - Do not need to hold dual anti-platelets for OR  - Continue frequent offloading    Trauma ACS [id number]    ACS/Trauma  [id number]

## 2025-04-07 NOTE — BH CONSULTATION LIAISON ASSESSMENT NOTE - NSBHCHARTREVIEWINVESTIGATE_PSY_A_CORE FT
< from: 12 Lead ECG (03.12.25 @ 19:50) >      Ventricular Rate 86 BPM    Atrial Rate 86 BPM    P-R Interval 214 ms    QRS Duration 116 ms    Q-T Interval 420 ms    QTC Calculation(Bazett) 502 ms    P Axis 1 degrees    R Axis 86 degrees    T Axis -38 degrees    Diagnosis Line SINUS RHYTHM WITH 1ST DEGREE A-V BLOCK  LOW VOLTAGE QRS  NONSPECIFIC T WAVE ABNORMALITY  PROLONGED QT  ABNORMAL ECG  WHEN COMPARED WITH ECG OF 20-DEC-2024  T wave changes have occurred  Confirmed by Cori Perez ([id number]) on 3/15/2025 11:12:48 PM    < end of copied text >    < from: CT Head No Cont (02.20.25 @ 18:47) >    ACC: [id number] EXAM:  CT BRAIN   ORDERED BY: GUY DE LA CRUZ     PROCEDURE DATE:  02/20/2025          INTERPRETATION:  CLINICAL INDICATION: Altered mental status    TECHNIQUE: Axial CT scanning of the brain was obtained from the skull   base to the vertex without the administration of intravenous contrast.   Reformatted coronal and sagittal images were subsequently obtained and   reviewed.    COMPARISON: None    FINDINGS:  There is no CT evidence of acute transcortical infarct. Age-related   involutional changes and chronic microvascular ischemic changes.    There is no hydrocephalus, mass effect, or acute intracranial hemorrhage.   No extra-axial collection. Basal cisterns are patent.    The visualized paranasal sinuses and mastoid air cells are clear.    The calvarium is intact.    IMPRESSION:  No evidence of acute transcortical infarct, acute intracranial   hemorrhage, or mass effect.    --- End of Report ---            CORTNEY REARDON MD; Attending Radiologist  This document has been electronically signed. Feb 20 2025  7:07PM    < end of copied text >

## 2025-04-07 NOTE — BH CONSULTATION LIAISON ASSESSMENT NOTE - CURRENT PASSIVE IDEATION:
Note Text (......Xxx Chief Complaint.): This diagnosis correlates with the
Detail Level: Detailed
Other (Free Text): Recommended ISDIN sunscreen and Effudex in fall.
Render Risk Assessment In Note?: no
Other (Free Text): Lesions Injected: 1\\nMedication Injected: 10.0 mg/cc of Kenalog\\nTotal Volume Injected: 0.1cc\\nAdministered by: Yun Schuster PA-C\\n\\nThe risks of atrophy were reviewed with the patient.
None known

## 2025-04-07 NOTE — PROGRESS NOTE ADULT - SUBJECTIVE AND OBJECTIVE BOX
INTERVAL HPI/OVERNIGHT EVENTS:     no events overnight       acetaminophen     Tablet .. 650 milliGRAM(s) Oral every 6 hours PRN  albuterol/ipratropium for Nebulization 3 milliLiter(s) Nebulizer every 6 hours  aspirin  chewable 81 milliGRAM(s) Oral daily  atorvastatin 40 milliGRAM(s) Oral at bedtime  benzocaine/menthol Lozenge 1 Lozenge Oral three times a day PRN  bisacodyl 5 milliGRAM(s) Oral every 12 hours PRN  chlorhexidine 2% Cloths 1 Application(s) Topical daily  clopidogrel Tablet 75 milliGRAM(s) Oral daily  Dakins Solution - 1/4 Strength 1 Application(s) Topical every 8 hours  epoetin aleah-epbx (RETACRIT) Injectable 48467 Unit(s) IV Push <User Schedule>  heparin   Injectable 5000 Unit(s) SubCutaneous every 8 hours  HYDROmorphone   Tablet 4 milliGRAM(s) Oral every 4 hours PRN  hydrOXYzine hydrochloride 25 milliGRAM(s) Oral three times a day  ibuprofen  Tablet. 400 milliGRAM(s) Oral every 12 hours  lactobacillus acidophilus 1 Tablet(s) Oral two times a day with meals  mupirocin 2% Ointment 1 Application(s) Topical <User Schedule>  Nephro-margaret 1 Tablet(s) Oral daily  oxyCODONE    IR 10 milliGRAM(s) Oral every 4 hours PRN  oxyCODONE  ER Tablet 30 milliGRAM(s) Oral every 12 hours  pantoprazole    Tablet 40 milliGRAM(s) Oral before breakfast  polyethylene glycol 3350 17 Gram(s) Oral daily  povidone iodine 10% Solution 1 Application(s) Topical two times a day  senna 2 Tablet(s) Oral at bedtime  sodium chloride 0.9% lock flush 10 milliLiter(s) IV Push every 1 hour PRN          Hemoglobin: 8.8 g/dL (04-01 @ 08:37)            Creatinine Trend: 2.87<--, 3.63<--, 2.52<--, 3.21<--, 4.04<--, 3.43<--    COAGS:           T(C): 36.9 (04-05-25 @ 00:29), Max: 36.9 (04-05-25 @ 00:29)  HR: 75 (04-05-25 @ 00:29) (60 - 87)  BP: 96/60 (04-05-25 @ 00:29) (96/60 - 104/70)  RR: 18 (04-05-25 @ 00:29) (18 - 18)  SpO2: 96% (04-05-25 @ 00:29) (92% - 96%)  Wt(kg): --    I&O's Summary    04 Apr 2025 07:01  -  05 Apr 2025 07:00  --------------------------------------------------------  IN: 480 mL / OUT: 500 mL / NET: -20 mL        Allergies    No Known Allergies    Intolerances        Review of Systems:    General:  No wt loss, fevers, chills, night sweats, fatigue   Eyes:  Good vision, no reported pain  ENT:  No sore throat, pain, runny nose, dysphagia  CV:  No pain, palpitations, hypo/hypertension  Resp:  No dyspnea, cough, tachypnea, wheezing  GI:  No pain, No nausea, No vomiting, No diarrhea, No constipation, No weight loss, No fever, No pruritis, No rectal bleeding, No melena, No dysphagia  :  No pain, bleeding, incontinence, nocturia  Muscle:  No pain, weakness  Neuro:  No weakness, tingling, memory problems  Psych:  No fatigue, insomnia, mood problems, depression  Endocrine:  No polyuria, polydypsia, cold/heat intolerance  Heme:  No petechiae, ecchymosis, easy bruisability  Skin:  No rash, tattoos, scars, edema         PHYSICAL EXAM:    Constitutional: NAD  HEENT: EOMI, throat clear  Neck: No LAD, supple  Respiratory: CTA and P  Cardiovascular: S1 and S2, RRR, no M  Gastrointestinal: BS+, soft, NT/ND, neg HSM,  Extremities: No peripheral edema, neg clubbing, cyanosis  Vascular: 2+ peripheral pulses  Neurological: A/O   Psychiatric: Normal mood, normal affect  Skin: No rashes

## 2025-04-07 NOTE — BH CONSULTATION LIAISON ASSESSMENT NOTE - DESCRIPTION
and now remarried and lives with spouse; has a daughter who is involved; retired and reports  as one of his previous jobs

## 2025-04-07 NOTE — BH CONSULTATION LIAISON ASSESSMENT NOTE - HPI (INCLUDE ILLNESS QUALITY, SEVERITY, DURATION, TIMING, CONTEXT, MODIFYING FACTORS, ASSOCIATED SIGNS AND SYMPTOMS)
Mr Sandy is a 76 yo M w no known psychiatric history, complicated medical history include polio with neurogenic bladder, iatrogenic rectal rupture s/p colostomy, stage 5 CKD, chronic pain, with multiple recent admissions for SONDRA on CKD who intended to present for initiation of hemodialysis for CKD but initially presented s/p fall with concern for knee fracture. During this hospital course, pt has initiated on HD, clinical course complicated by wound care of lower extremities and pain control. Primary team had concern for pt making suicidal statements, wanting to die. Thus, psychiatry was consulted for assessment of suicidality.    Discussed pt case with nursing. Nursing unaware of any suicidal statements. Reports that pt intermittently will refuse care due to frustration. No MH concerns. No behavioral incidents noted in chart. Receiving scheduled Atarax for itching, otherwise no psychotropics ordered.    Pt seen at bedside. Denies making suicidal statements. States that he takes a lot of medications and that he may have said things that the did not mean but he does not recall. States that he has gone through a lot and that he wants to live. Frustrated by care provided while within the hospital and feels like teams need greater coordination. Denies needing psychiatry at this time. Denies history of suicide attempts in the past. Uninterested in speaking with psychiatry.    Spouse came to bedside and denied that there are concerns for pt to self harm or kill self.    Pt oriented to self, hospital, start of April. When asked year, pt replied "2099" and then asked again with repeated questioning replied "2098?"    Denies SI/HI/AVH.

## 2025-04-07 NOTE — PROGRESS NOTE ADULT - SUBJECTIVE AND OBJECTIVE BOX
Surgery Progress Note    S: Patient seen and examined. No acute events overnight. Patient reports in comfortable position this AM, reports he would not like the sacral wound to be examined this AM.    O:  Physical Exam:  Gen: Laying in bed, NAD  Resp: Unlabored breathing    Vital Signs Last 24 Hrs  T(C): 36 (07 Apr 2025 07:18), Max: 36.7 (07 Apr 2025 04:23)  T(F): 96.8 (07 Apr 2025 07:18), Max: 98 (07 Apr 2025 04:23)  HR: 85 (07 Apr 2025 07:18) (80 - 91)  BP: 110/60 (07 Apr 2025 07:18) (106/64 - 146/78)  BP(mean): --  RR: 18 (07 Apr 2025 07:18) (18 - 18)  SpO2: 93% (07 Apr 2025 07:18) (93% - 96%)    Parameters below as of 07 Apr 2025 07:18  Patient On (Oxygen Delivery Method): room air        I&O's Detail                            9.3    7.32  )-----------( 284      ( 07 Apr 2025 08:46 )             30.2       04-07    134[L]  |  98  |  42[H]  ----------------------------<  95  4.6   |  22  |  3.94[H]    Ca    7.2[L]      07 Apr 2025 08:46

## 2025-04-08 NOTE — PROGRESS NOTE ADULT - ASSESSMENT
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 He is well known to me from multiple recent admisions  s/p admissions at [facility] 12/20-12/25/24 and 2/4-2/7 with SONDRA on CKD with associated uremic symptoms.   At the last admission he had expressed strong interest to try to hold off with HD intiation but he has been getting worse clinically at home.  His SONDRA and uremic symptoms significantly improved after the first admission; they improved a to mild extent during the 2nd admission to the point where he was able to be discharged without HD. Since then, however, he has worsened further.   He has been suffering from progressively worsening diffuse pruritus, loss of appetite, and generalized weakness and falls.   His nephrologist and PCP has been speaking with him and his family over the past few days,   The initial plan was that he would present to the [facility] ER Monday 2/17 (ie tomorrow) for HD initiation. Today, however, he fell and sustained trauma to his knee. Given the fall and concern for knee fracture, he presented to the ER today. multiple xrays show no Fractures.      CKD5, uremia, requiring initiation of HD  Rash, seborrheic dermatitis  Pruritis  Anemia  PAD  SP catheter, chronic  Left inferior pole acute on chronic patella Fx    plan  - HD via R subclavian permacath  -4/8: ptn is awaiting debridement in the OR today, debulking and wound care at the bedside has been challenging 2/2 pain not allowing 2/2 pain and fear of pain  - 4/7: ptn complaining his pain meds are not covering his pain completely. yesterday he was talking about death a lot and how he wished to die. had psych eval today. today he is doing better emotionally, not talking about drath and wants to cont treatment. he is tolerating HD, he finished ABx for PNA last week. his wounds are the primary reason of admission at this point.  I also called palliaitive consult.   He was seen by surgery for debridement of sacral decub, ptn is refusing diverting colostomy. on admission ptn had intact skin but did show primary stages of pressure injury in the sacral region. ptn didnt allow us to turn him 2/2 pain in LLE for several days, despite our concern of further pressure injury on the sacrum. once he was turned we discovered skin break and started wound care. again he would not allow us to turn him. he said the wound is ok, its scabbed, Dee Dee( his wife) is handling it. I called wound care to see him, wound care discovered that the area had necrosis, not scabs, debulking at the bedside was performed, ptn complained it was extremely painful. ptn was seen by wound care attending and recommended debridement in the OR under anesthesia. it is planned for 4/9. it was also recommended he should have a diverting colostomy. ptn is refusing it. HCP his daughter Yanique aware.   -4/6: spoke w ptn's daughter Yanique on the phone today x 20 min, spoke to ptn , his wife and daughter yanique at the bedside x 60 min today. ptn is agreeing to debridement in the OR, adamantly refusing diverting colostomy he would consider it if post debridement he would not improve and would require another debridement. ptn was seen by urology and ID bc daughter complained increased amount of "pus" at the SPT, both ID and urology feel there is no acute infection/cellulitis, the drainage at the site and expected and not unusual. daughter and wife state ptn didnt have sacral wound when he came. on admission with the aide of his wife we flipped him and he had 1st degree pressure decubiti sacrally, skin was intact.   - 4/5: thorough eval by wound care attending 4/4/25, case d/w wound care attending Dr Dugan. as per her recommendation placed general surgery consult for OR debridement and diverting colostomy. ptn had sacral decubiti prior to admission  -4/4: dr dugan from wound care had an extensive conversation w the ptn and daughter yanique on the phone at bedside. ptn needs general surgery consult for OR debridement of necrotic tissue of the sacral decubiti and diverting colostomy. ptn did not tolerate debridement at the bedside. . completed Abx for PNA  -4/3: ptn's insurance changed to straight medicare/medicaid. now able to go to [facility], but ptn wants to go home. will need equipment set up. this was d/w CM and ACP, daughter Yanique on the phone and wife at bedside today. today is last day of ABx. will transition pain meds to po DIlaudid from IV, will also need outptn pain management F/U, outptn wound care, home PT, need outptn HD set up  -4/2: seen prior to going to HD, no new events. tomorrow completing 10 day course of Meropenem. ongoing need for narcotics 2/2 pain.   -4/1: ptn is frustrated about a prolonged hospitalization but states he feels better overall and once medically cleared he will be ready to go home, not sooner than 4/7. completing ABx on 4/3.   -3/31: at HD today had 1.7 L UF, post HD was hypotense, gave gentle IVF , BP still a bit low. afebrile, loose BMs but not diarrhea, GI PCR not sent, RVP neg. . on Meropenem course for total of 10 days, last day 4/3, would care for decubiti and left knee wound post trauma  -3/30: Tmax 100.9, c/o dry cough and diarrhea but not watery. pain is controlled at the time of visit. wife at bedside. will check RVP panel, GI PCR, sine diarrhea non watery will not order C. Diff. ID to follow  - 3/29: cont meropenem, seen by coverage  - 3/28: pain from decubitus ulcer and Left knee post debridement continues. wound care recs in place. on Meropenem for PNA, pulm and ID following. HD as per renal. HDS  -3/27: ptn is complaining that pain post decubital and left knee debridement has been severe. his daughter is on speaker phone, wife is at bedside. his pain is controlled when meds are administered. he is very uncomfortable due to the pain at the site of decubitus debridement. lyrica helped his pain before but made him drowsy, griffin with gabapentin. he doesnt want them to be resumed. will cont dilaudid, oxycodone, oxycontin, ibuprofen. he is on Meropenem for aspiration PNA/HCAP. [facility] for DVT ppx. seen by vascular cardiology to address R IJ post shiley non occlusive DVT. full AC was not recommended. will check rpt doppler in 3-4 weeks.   - 3/26: Wound care performed bedside debridement of Left knee wound 2/2 lifted eschar, and sacral decubitus. findings c/w possible infection and mainly fat necrosis. cont Meropenem. ID following. podiatry following for gangrenous toes. will give additional single dose IV DIlaudid 2/2 severe pain post debridement.    - 3/25: wife at bedside, daughter on speaker phone at bedside. ptn is pain free at the time of visit and states he wants to cont the pain regiment he is presently on. ct C/A/P revealed: RLL PNA, prob aspiration, stercoral proctitis, decubitus ulcer w possible progression to sacral OM, R IJ nonocclusive DVT. these findings d/w ptn ,his family, wound care, ACP, Vascular, Nephrology, ID, pUlm, GI. meropenem initiated, Bactrim stopped. ptn states when he is cleared for DC, he wants to go home , not to [facility], not to SNF. daughter and wife aware.   -3/24: ptn  was seen by GI for N/V, its not clear the etiology, will obtain Ct C/A/P. ptn states he is coughing up green mucus as well  -3/23:  ptn is in good spirits, says he vomited "spit" this am, but tolerated all meals without vomiting. no abd pain, no undigested food in the vomit. afebrile, no diarrhea, RVP neg. GI consulted.   -3/21-22: ptn feels well.  pain is controlled. still no accepting facility for [facility]  -3/20: ptn states he has severe low back and LLE pain though overall is improving.   -3/19: ptn w tan crusting around SPC , states its painful. started on po Bactrim, renally dosed by ID for cellulitis.   -3/18: LLE paraesthesias are chronic, will d/w CM re dc planning to [facility]    -3/17: ptn states he is lethargic, he has LLE numbness which is new and severe pain at SPC insertion site. this was ID, d/w neuro, renal and vascular. as per ID: no sign of an acute infection recommend CT A/P.   -3/16:  urine cx from 3/15 NTD, zosyn DCed, awaiting dc to [facility], not sure will be able to go to [facility] since there is an insurance coverage conflict. HD as per renal  - 3/15: spoke w ptn's daughter today re denial from [facility] for [facility]. ptn has [employer] medicare advantage plan, which [facility] doesnt accept. i recommended to speak  to Cm and to the insurance company to find participating facilities. ptn is stable today. pain and erythema at SP catheter site has resolved today. seen by ID, will cont ZOSYN for now. UCx sent.   - 3/14: suprapubic tube changhed by , ptn has crusting at the insertion site and pain. will start Abx, urine always has sediment, will sent for cx, start Vanco/ZOsyn. ID consult called. check MRSA/MSSA PCR  -3/13: ptn is doing well. ptn has an accepting rehab facility, now pending AUTH.   -3/12: ptn is no longer constipated. doing well off prn IV DIlaudid. awaiting dc to [facility]  -3/11: ptn has no new c/o other than feeling constipated, lactulose ordered. also in preparation for [facility] will dc prn IV DIlaudid, cont prn OXy IR  -3/10:  ptn is awake, alert, wife at bedside, i instructed them to give rehab choices. also awaiting SPC change to be done by urology. pain is controlled  -3/9: seen by PT, will refer to [facility]. choices to be given in AM. this was d/w ptn, daughter, wife.   -3/8:  ptn didnt want to get PT eval done, will reorder. ptn needs [facility] placement. PMNR consult ordered  - 3/7: ptn is alert , pain is controlled, DC planning d/w ptn and HCP, daughter Yanique  -3/6:  ptn is awake, alert, ecchymotic feet look improved, pain is controlled. DC planning to [facility]  3/4-5 - s/p b/l iliac arteries angioplasty and stent placements on 3/3. today has new ecchymoses in b/l LE, concern for arterial insufficiency. vascular aware.. wound from Left knee immobilizer is dressed and healing. pain is controlled.   LEFT UE AVF placement/scheduling will be on outptn basis as per vascular. dc planning to [facility]  - 3/3: ptn is s/p angiogram RLE : b/l iliac arteries w successful angioplasty and stents. in PACU. awaitng HD.  ptn has a pressure wound from the LLE immobilizer posteriorly proximal to the knee. its been on 2/2 knee fracture, applied by ortho and managed by ptn's wife as per ptn's and wife's request , this was d/w nursing and nurse manager ms Ruiz.. immobilizer should be managed by orthopedics and nursing. will keep it off, only keep on when repositioning for care and then remove. wound care to F/U . this was discussed at length w daughter Yanique and wife Dee Dee.   -3/1-3/2: ptn is smiling, pain free, had HD 2/28 and 3/1, awaiting RLE angiogram 3/3, on Heparin drip, euvolemic  -2/28: ptn is lethargic, awaiting RLE angiogram possible fem-fem bypass hopefully on 3/3 pending OR availability, awaiting HD today    -2/27:  plan for angiogram in am with possible angioplasty, possible stent, possible fem-fem bypass. medically cleared for angiogram and possible surgery, stent, angioplasty. pain is controlled. remains on heparin drip as per vascular. HD as per renal    -2/26:  ptn is sleeping, pain is controlled, he was seen by wound care and vascular attending. planning on angiogram 2/2 severe PAD in LE on CTA. he also spoke to HCP. ptn and HCP in agreement. ptn was started on HEPARIN drip as per vascular recs. RIJ permacath done 2/25    - 2/25: ptn states he is hallucinating, but not at present, his MS is at baseline, his pain is controlled, he still needs prn pain meds when he is moved in the bed or for testing or for HD. awaiting Permacath, AVF creation, LE bypass to be d/w Dr. Swenson and the ptn tomorrow. ptn has cardiology clearance  if he opts for. Ptn has sacral decubiti and posterior thigh and buttock decibiti and b/l heel decubiti. Z flow fabienne d/w RN, get wound care consult    -2/24: pain is controlled  if the LLE is immobile and fully extended. doesn't tolerate the knee immobilizer. has a medium condyle and acute on chronic patella fx in LEFT knee. as per vascular ptn will need iliac stent  w a fem-fem bypass, possible axillo-femoral bypass. for this surgery he would need cardiac work up . more pressing surgery is LUE AVF creation,  in IR will arrange for Permacath. for pain control: Motrin 400 mg q12H, Oxy ER 30 mg q12H, Oxy IR 10 for mod pain and dilaudid 2 mg iv for severe pain. still has pruritis though improved, will raise atarax 25 mg to tid. plan of care d/w daughter Norma Persaud , ptn and his wife. Also d/w renal, card, vascular, ACP    -2/23: Daughter Yanique is the HCP, she and the ptn filled out the paperwork. daily plan and findings d/w her and the ptn. MS is at abseline, c/o b/l LE foot pain and Left Knee pain. xrays of left knee: cannot r/o acute on chronic inferior pole patellar Fx. knee immobilizer is on, awaiting ortho consult. doubt needs any intervention awaiting Ct chest today, will add CT Left knee. ptn states itching has recurred, severe pain has recurred. will resume Atatrax ( 25 mg bid) and Oxycodone ER , but at a lower dose 15 mg q12H. cont prn analgesics. HD as per renal, awaiting Vascular consult f/u re CTA A/L &LE and recs. ptn also wants AVF surgery on this admission. Coughing has stopped    - 2/22: ptn is drowsy but arousable, calmer today, answers questions appropriately. he is pain free, he stopped coughing, he denies having pruritis: will DC:  OXY ER, Atarax, Tessalon perles.     -  2/21: ptn is tearful, a bit confused, coughing, recognizes me and family at bedside. GOC d/w daughter and wife. AMS prob delirium 2/2 acute illness +/- opiods, lyrica, atarac. will lower atarak to tid, dc lyrica, cont Oxy 2/2 ptn has severe LE pain. neuro called for eval. Head ct done yesterday w no acute findings. CTA A/P w LE run fof: severe PAD, vascular to follow up on plan fo care. plan for HD tomorrow and next week place on tiw schedule of MWF. will need tunneled catheter prior to DC and will d/w vascular scheduling of AVF. ptn wants all to be done while inptn. daughter wants to be HCP as per ptn's wishes. she was given paperwork to get it signed and witnessed and will present to nursing thereafter. details of findings and complaints and plan of care d/w daughter and wife. spent 60 min. RVP ordered. start mucinex , tessalon perles, duonebs, get CT chest to r/o PNA. pulm called    -  2/20:  ptn had RRT 2/2 AMS and tremors. no SZ, no LOC. noted to have Na 123( hyponatremia), given 500 cc NS. Gabapentin DCed. ptn had been taking gabapentin at home PTA. Pain is controlled. Pruritis resolved, tolerating HD otherwise.     - Pain management:  cont Oxycodone 10 IR q6H,  DIlaudid prn severe pain 2 mg q4H prn.   - cont outptn meds  - DVT ppx w [facility]

## 2025-04-08 NOTE — CONSULT NOTE ADULT - TIME BILLING
Advanced care planning was discussed with patient and family.  Advanced care planning forms were reviewed and discussed as appropriate.  Differential diagnosis and plan of care discussed with patient after the evaluation.   Pain assessed and judicious use of narcotics when appropriate was discussed.  Importance of Fall prevention discussed.  Counseling on Smoking and Alcohol cessation was offered when appropriate.  Counseling on Diet, exercise, and medication compliance was done.
Total Time Spent 81 minutes.    This includes chart review, patient assessment, discussion and collaboration with interdisciplinary team members, excluding ACP.    COUNSELING:  Face to face meeting to discuss Advanced Care Planning- Time Spent 21 minutes

## 2025-04-08 NOTE — PROGRESS NOTE ADULT - SUBJECTIVE AND OBJECTIVE BOX
Subjective: Patient seen and examined. No new events except as noted.   Pending OR today for debridement of sacral ulcer     REVIEW OF SYSTEMS:    CONSTITUTIONAL: No weakness, fevers or chills  EYES/ENT: No visual changes;  No vertigo or throat pain   NECK: No pain or stiffness  RESPIRATORY: No cough, wheezing, hemoptysis; No shortness of breath  CARDIOVASCULAR: No chest pain or palpitations  GASTROINTESTINAL: No abdominal or epigastric pain. No nausea, vomiting, or hematemesis; No diarrhea or constipation. No melena or hematochezia.  GENITOURINARY: No dysuria, frequency or hematuria  NEUROLOGICAL: No numbness or weakness  SKIN: No itching, burning, rashes, or lesions   All other review of systems is negative unless indicated above.    MEDICATIONS:  MEDICATIONS  (STANDING):  albuterol/ipratropium for Nebulization 3 milliLiter(s) Nebulizer every 6 hours  aspirin  chewable 81 milliGRAM(s) Oral daily  atorvastatin 40 milliGRAM(s) Oral at bedtime  bacitracin/polymyxin B Ointment 1 Application(s) Topical two times a day  chlorhexidine 2% Cloths 1 Application(s) Topical daily  clopidogrel Tablet 75 milliGRAM(s) Oral daily  Dakins Solution - 1/4 Strength 1 Application(s) Topical every 8 hours  epoetin aleah-epbx (RETACRIT) Injectable 90297 Unit(s) IV Push <User Schedule>  heparin   Injectable 5000 Unit(s) SubCutaneous every 8 hours  hydrOXYzine hydrochloride 25 milliGRAM(s) Oral three times a day  ibuprofen  Tablet. 400 milliGRAM(s) Oral every 12 hours  lactobacillus acidophilus 1 Tablet(s) Oral two times a day with meals  mupirocin 2% Ointment 1 Application(s) Topical <User Schedule>  Nephro-margaret 1 Tablet(s) Oral daily  oxyCODONE  ER Tablet 30 milliGRAM(s) Oral every 12 hours  pantoprazole    Tablet 40 milliGRAM(s) Oral before breakfast  polyethylene glycol 3350 17 Gram(s) Oral daily  povidone iodine 10% Solution 1 Application(s) Topical two times a day  senna 2 Tablet(s) Oral at bedtime      PHYSICAL EXAM:  T(C): 36.5 (04-08-25 @ 00:10), Max: 36.5 (04-08-25 @ 00:10)  HR: 82 (04-08-25 @ 00:10) (82 - 91)  BP: 109/67 (04-08-25 @ 00:10) (98/65 - 111/42)  RR: 18 (04-08-25 @ 00:10) (18 - 18)  SpO2: 98% (04-08-25 @ 00:10) (92% - 98%)  Wt(kg): --  I&O's Summary    07 Apr 2025 07:01  -  08 Apr 2025 07:00  --------------------------------------------------------  IN: 240 mL / OUT: 500 mL / NET: -260 mL          Appearance: NAD  HEENT:  Dry oral mucosa, PERRL, EOMI	  Lymphatic: No lymphadenopathy  Cardiovascular: Normal S1 S2, No JVD, No murmurs, No edema  Respiratory: Lungs clear to auscultation	  Psychiatry: A & O x 3, Mood & affect appropriate  Skin: No rashes, No ecchymoses, No cyanosis	  Neurologic: Non-focal  GI/Abd: Soft, obese, nontender. Dressing to [facility] C/D/I. Suprapubic catheter in place  Ext: Palp femoral pulses bilat. BLE atrophic, minimal dorsi/plantarflexion bilaterally. Sensation grossly intact. LLE edematous compared to R, erythema to right toes/forefoot. mottling of right toes/forefoot/heel  LLE:  small superficial abrasion, +edema and +ecchymosis over L knee  +TTP over L knee, no TTP along remainder of extremity; compartments soft  Limited ROM at knee 2/2 pain  No gross varus/valgus laxity, but assessment limited 2/2 pain  Motor: TA/EHL/GS/FHL intact  Sensory: DP/SP/Tib/Jordan/Saph SILT  +DP pulse (symmetric relative to contralateral side), WWP   pressure wound from the LLE immobilizer posteriorly proximal to the knee.  Vascular: Peripheral pulses palpable 2+ bilaterally          LABS:    CARDIAC MARKERS:                                10.0   7.14  )-----------( 251      ( 08 Apr 2025 07:34 )             32.9     04-08    133[L]  |  98  |  33[H]  ----------------------------<  63[L]  4.3   |  22  |  3.13[H]    Ca    7.3[L]      08 Apr 2025 07:34      proBNP:   Lipid Profile:   HgA1c:   TSH:             TELEMETRY: 	    ECG:  	  RADIOLOGY:   DIAGNOSTIC TESTING:  [ ] Echocardiogram:  [ ]  Catheterization:  [ ] Stress Test:    OTHER:

## 2025-04-08 NOTE — PRE-OP CHECKLIST - INTERNAL PROSTHESES
bilateral hip hardware, x2 stents on bilateral iliac artery, spinal hardware/yes(specify)
L hip/ femur hardware/yes(specify)

## 2025-04-08 NOTE — BRIEF OPERATIVE NOTE - OPERATION/FINDINGS
[id number] sacral wound with necrotic tissue and rind. Debridement was performed with sharp dissection followed by pulse irrigation. Left thigh wounds x 2 in posterior thigh noted and debridement was performed with sharp dissection.

## 2025-04-08 NOTE — PROGRESS NOTE ADULT - ASSESSMENT
74 year old man with SONDRA on CKD requiring HD, now with nausea    1. ESRD on HD (new)     2. Proctitis d/t constipation which has since resolved   -maintain on senna qhs with Miralax twice daily   -enema as needed     3. Decubitus Ulcer   f/u Sx, ID and wound care teams   possible diverting ostomy  OR for decub debridement today, 4/8, with Sx team

## 2025-04-08 NOTE — PROGRESS NOTE ADULT - SUBJECTIVE AND OBJECTIVE BOX
[facility]  SABINO Griffin Y. Patel, S. Shah, G. Casimir  [phone]  ([phone] - weekdays after 5pm and weekends)    Name: BRIAN DEMPSEY  Age/Gender: 75y Male  MRN: [id number]    Interval History:  Patient seen and examined this morning.   No new complaints, for OR today.  Notes reviewed  No concerning overnight events  Afebrile   Allergies: No Known Allergies      Objective:  Vitals:   T(F): 97.7 (04-08-25 @ 00:10), Max: 97.7 (04-08-25 @ 00:10)  HR: 82 (04-08-25 @ 00:10) (82 - 91)  BP: 109/67 (04-08-25 @ 00:10) (98/65 - 111/42)  RR: 18 (04-08-25 @ 00:10) (18 - 18)  SpO2: 98% (04-08-25 @ 00:10) (92% - 98%)  Physical Examination:  General: no acute distress  HEENT: normocephalic, atraumatic, anicteric  Respiratory: no acc muscle use, breathing comfortably  Cardiovascular: S1 and S2 present  Gastrointestinal: normal appearing, nondistended  Extremities: b/l LE dressing, edema, necrotic toes     Laboratory Studies:  CBC:                       10.0   7.14  )-----------( 251      ( 08 Apr 2025 07:34 )             32.9     WBC Trend:  7.14 04-08-25 @ 07:34  7.32 04-07-25 @ 08:46  6.03 04-06-25 @ 09:05  6.30 04-05-25 @ 11:25    CMP: 04-08    133[L]  |  98  |  33[H]  ----------------------------<  63[L]  4.3   |  22  |  3.13[H]    Ca    7.3[L]      08 Apr 2025 07:34    Creatinine: 3.13 mg/dL (04-08-25 @ 07:34)  Creatinine: 3.94 mg/dL (04-07-25 @ 08:46)  Creatinine: 3.36 mg/dL (04-06-25 @ 09:05)  Creatinine: 2.83 mg/dL (04-05-25 @ 11:25)    Microbiology: reviewed   Radiology: reviewed     Medications:  acetaminophen     Tablet .. 650 milliGRAM(s) Oral every 6 hours PRN  albuterol/ipratropium for Nebulization 3 milliLiter(s) Nebulizer every 6 hours  aspirin  chewable 81 milliGRAM(s) Oral daily  atorvastatin 40 milliGRAM(s) Oral at bedtime  bacitracin/polymyxin B Ointment 1 Application(s) Topical two times a day  benzocaine/menthol Lozenge 1 Lozenge Oral three times a day PRN  bisacodyl 5 milliGRAM(s) Oral every 12 hours PRN  chlorhexidine 2% Cloths 1 Application(s) Topical daily  clopidogrel Tablet 75 milliGRAM(s) Oral daily  Dakins Solution - 1/4 Strength 1 Application(s) Topical every 8 hours  epoetin aleah-epbx (RETACRIT) Injectable 71368 Unit(s) IV Push <User Schedule>  heparin   Injectable 5000 Unit(s) SubCutaneous every 8 hours  HYDROmorphone  Injectable 2 milliGRAM(s) IV Push every 4 hours PRN  hydrOXYzine hydrochloride 25 milliGRAM(s) Oral three times a day  ibuprofen  Tablet. 400 milliGRAM(s) Oral every 12 hours  lactobacillus acidophilus 1 Tablet(s) Oral two times a day with meals  mupirocin 2% Ointment 1 Application(s) Topical <User Schedule>  Nephro-margaret 1 Tablet(s) Oral daily  oxyCODONE    IR 10 milliGRAM(s) Oral every 4 hours PRN  oxyCODONE  ER Tablet 30 milliGRAM(s) Oral every 12 hours  pantoprazole    Tablet 40 milliGRAM(s) Oral before breakfast  polyethylene glycol 3350 17 Gram(s) Oral daily  povidone iodine 10% Solution 1 Application(s) Topical two times a day  senna 2 Tablet(s) Oral at bedtime  sodium chloride 0.9% lock flush 10 milliLiter(s) IV Push every 1 hour PRN    Prior/Completed Antimicrobials:  piperacillin/tazobactam IVPB.  piperacillin/tazobactam IVPB.  piperacillin/tazobactam IVPB.-  piperacillin/tazobactam IVPB.-  trimethoprim   80 mG/sulfamethoxazole 400 mG  trimethoprim  160 mG/sulfamethoxazole 800 mG  vancomycin  IVPB  vancomycin  IVPB

## 2025-04-08 NOTE — PROGRESS NOTE ADULT - PROBLEM SELECTOR PLAN 7
-Per wound care  -Pain control  -Plan for OR today for debridement of sacral wound. No pulmonary contraindications to planned procedure.

## 2025-04-08 NOTE — PROGRESS NOTE ADULT - ASSESSMENT
75 year old male with CKD, sp polio, paraplegia with associated neurogenic bladder s/p suprapubic catheter who was admitted s/p fall on 2/16.   CKD - ESRD, now s/p DEYA [place] 2/17, on HD  2/20 s/p RRT for tremors and confusion -- pt with chronic tremors although notably worse  2/20 CXR with clear lungs   SPC site with chronic/stable inflammatory changes, no drainage - no sign of SSTI  CTA abd with occlusion of b/l external iliac arteries and b/l superficial femoral arteries with distal reconstitution at popliteal arteries; patent three vessel runoff of RLE with 2 vessel runoff of LLE with occlusion of L mid anterior tibial artery with distal reconstitution  Vascular following for worsening PAD with worsening ischemic changes, necrotic toes   3/3 s/p RLE angiogram b/l iliac artery stents   mental status at baseline    3/16 no tenderness at suprapubic catheter site, no visible erythema at catheter site on exam  UA w/ many epithelial cells, Ucx negative  s/p zosyn 3/14-3/16  3/19 SPC site remains without erythema but now noted with some tan crusting on dressing  3/23 reporting vomiting x5 episodes, no diarrhea or other focal sx  Treated for possible SPC site infection with bactrim 3/19-3/25  3/25 CT Chest noted RLL consolidative opacities, LLL opacities - pneumonia vs atelectasis, few ill defined nodular opacities in LLL infectious vs inflammatory  3/25 CTAP with subcutaneous gas and infiltration tracking along medial R buttock, possibly related to decubitus wound with gas tracking from the external environment, possible necrotizing air forming infection; rectal wall thickening suggestive of proctitis; b/l mild hydroureteronephrosis to level of bladder with no obstruction and diffuse urothelial thickening of b/l renal collecting systems and ureters similar to 2/20  MRSA screen has been negative   pt with no resp symptoms- no cough, dyspnea or pain, may have aspirated with vomiting earlier, saturating well on RA  3/26 s/p bedside debridement of L knee wound  as dry eschar  from wound edges without drainage and bedside excisional debridement of unstageable sacrum to left buttock into perineum evolving unstageable pressure injury through necrotic dermis into nonviable subcutaneous tissues, debulking debridement of necrotic tissue done by Wound care; other wounds noted including L calf to ankle wound with liquefaction necrotic drainage; R ischium wound with moist pink granular tissue and L buttock fading DTI  3/30 overnight febrile to 100.9F ?inflammatory -- no further fevers noted, WBC wnl, was on meropenem   dry cough noted, COVID/Flu/RSV negative  loose stools per pt after bowel regimen--no diarrhea, some nausea with no vomiting   Completed meropenem x10d course on 4/3   -sacral wound with eschar with surrounding red granulation tissue, noted no obv acute infection, no sig purulence, no sig surrounding erythema noted, remains afebrile, WBC wnl, nontoxic appearing   -patient refused bedside debridement, ok with going to OR for debridement with surgery, noted likely defer diverting ostomy pending debridement     Recommendations:   Surgery following - plan for OR today for debridement of sacral ulcer  -please send cultures/path   Continue off antibiotics for now   Monitor temps/WBC  Continue local wound care, nutrition, offloading as able  HD per renal    SPC site dressing change and care   Continue rest of care per primary team       Bridgette Ramirez M.D.  [facility]  Available on [employer] TEAMS - *PREFERRED*  [phone]  After 5pm on weekdays and all day on weekends - please call [phone]     Thank you for consulting us and involving us in the management of this patients case. In addition to reviewing history, imaging, documents, labs, microbiology, took into account antibiotic stewardship, local antibiogram and infection control strategies and potential transmission issues at time of treatment decision making process.

## 2025-04-08 NOTE — CONSULT NOTE ADULT - ASSESSMENT
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  Requiring debridement of sacral wound.  Palliative consulted for pain management and assistance with complex medical decision making  74 year-old man with history of multiple medical issues including polio with associated neurogenic bladder/suprapubic catheter, iatrogenic rectal rupture requiring colostomy 2/2023, and stage 5 chronic kidney disease now on HD via permacath.  Requiring debridement of sacral wound.  Palliative consulted for goals of care and symptoms management

## 2025-04-08 NOTE — CHART NOTE - NSCHARTNOTEFT_GEN_A_CORE
POST-OPERATIVE CHECK    SUBJECTIVE  Patient is s/p sacral and left posterior thigh wound debridement. Patient endorses 8/10 leg pain that improves with medication. Endorses nausea and chest tightness but denies vomiting or SOB.      Vital Signs Last 24 Hrs  T(C): 36.8 (08 Apr 2025 20:40), Max: 36.8 (08 Apr 2025 20:40)  T(F): 98.3 (08 Apr 2025 20:40), Max: 98.3 (08 Apr 2025 20:40)  HR: 88 (08 Apr 2025 20:40) (78 - 89)  BP: 118/69 (08 Apr 2025 20:40) (100/50 - 166/70)  BP(mean): 72 (08 Apr 2025 20:00) (59 - 106)  RR: 18 (08 Apr 2025 20:40) (15 - 18)  SpO2: 96% (08 Apr 2025 20:40) (95% - 100%)    Parameters below as of 08 Apr 2025 20:00  Patient On (Oxygen Delivery Method): room air      I&O's Detail    07 Apr 2025 07:01  -  08 Apr 2025 07:00  --------------------------------------------------------  IN:    Oral Fluid: 240 mL  Total IN: 240 mL    OUT:    Other (mL): 500 mL  Total OUT: 500 mL    Total NET: -260 mL      08 Apr 2025 07:01  -  08 Apr 2025 21:21  --------------------------------------------------------  IN:    Oral Fluid: 240 mL  Total IN: 240 mL    OUT:    Indwelling Catheter - Suprapubic (mL): 300 mL  Total OUT: 300 mL    Total NET: -60 mL        aspirin  chewable 81  clopidogrel Tablet 75  heparin   Injectable 5000    PAST MEDICAL & SURGICAL HISTORY:  Polio      Hypertension      H/O urinary retention  suprapubic tube      HLD (hyperlipidemia)      BPH with obstruction/lower urinary tract symptoms      Erectile dysfunction      Bladder outlet obstruction      Presence of suprapubic catheter      Colostomy status      DVT, lower extremity      H/O cardiac murmur      S/P colostomy  2/2024      Suprapubic catheter      S/P ORIF (open reduction internal fixation) fracture  b/l legs      S/P cataract surgery  right      H/O shoulder surgery          PHYSICAL EXAM  General: NAD, resting comfortably in bed  Pulmonary: Nonlabored breathing, no respiratory distress on NC   Refusing exam of lower extremity and sacrum at this time   Neuro: Awake and alert, answering questions appropriately     LABS                        10.0   7.14  )-----------( 251      ( 08 Apr 2025 07:34 )             32.9     04-08    133[L]  |  98  |  33[H]  ----------------------------<  63[L]  4.3   |  22  |  3.13[H]    Ca    7.3[L]      08 Apr 2025 07:34      PT/INR - ( 08 Apr 2025 07:34 )   PT: 15.1 sec;   INR: 1.32 ratio         PTT - ( 08 Apr 2025 07:34 )  PTT:36.0 sec  CAPILLARY BLOOD GLUCOSE          IMAGING    ASSESSMENT  74 yo M w/ PMHx of multiple comorbidities including polio, chronic pain with multiple sequela including LE weakness requiring wheelchair attributed to polio since 18 months of age, scoliosis, neurogenic bladder s/p suprapubic catheter, and colostomy s/p iatrogenic rectal rupture injury, reversal of colostomy, anemia, erectile dysfunction, multiple fractures including patella and femoral condyle with gianluca placement of the lower extremities, deep venous thrombosis, elevated hemidiaphragm, hyperlipidemia, bacterial sepsis, electrolyte imbalance, shoulder surgery, cataract surgery, tremors, PAD who initially presented to ED after fall from wheelchair found to have ESRD now on HD w progressive wounds. Surgery consulted for evaluation of sacral wound. Now, s/p sacral and left posterior thigh wound debridement on 4/8.     PLAN  - Patient refusing exam of post-operative sites - will try again in AM   - Wound care:              - Sacrum: wet to dry dressing covered with allyven              - Posterior Thigh: wet to dry dressing covered with abdominal pad   - wound pathology and cultures were sent from case - f/u results   - continue frequent offloading   - Medicine ACP Deedee Zavala informed of pts post-operatively chest tightness - she will order EKG and follow for further management   - pain control as needed   - appreciate care per primary team     Trauma/ACS  [id number]

## 2025-04-08 NOTE — CONSULT NOTE ADULT - CONVERSATION DETAILS
[facility] team introduced their role in the patient's care to help management his symptoms.     Mr Healy shared he was diagnosed with polio at the age of 18 months old and his sister  from polio.  He states he came to the [place] from [facility] and built a life for himself and his family of 3 children.  he found success as an . He states he has always been in control of his life up until this admission. Prior to this admission he was wheelchair bound but reports he was independent.  Since being admitted he felt he has lost control and his pain is so severe that it causes him distress.  [facility] team expressed sympathy and we discussed plans to address his needs.  Mr Delgado and his wife Dee Dee were in agreement with the plan First introduced palliative team and our role in patient care. Patient and wife verbalized understanding and was receptive with palliative care consult. We reviewed the patient's medical and social history, as well as the events that led to her hospitalization.     The patient shared he was diagnosed with polio at the age of 18 months old and his sister  from polio.  He states he came to the [place] from [place] and built a life for himself and his family of 4 children, he found success as an . He states he has always been in control of his life up until this admission. Prior to this admission he was wheelchair bound but reports he was independent.  Since being admitted he felt he has lost control and his pain is so severe that it causes him distress. He stated that his goal is to prolong life as long as possible, given he has a beautiful family who has been very supportive during this difficult moments    Discussed with patient regarding the importance of designating HCP. Shared with patient that a health care proxy is a legal document that appoints someone else to serve as your health care agent regarding healthcare decisions when you cannot do so for yourself. Patient stated that he assigned his daughter Cori, chepe to bring form to the hospital.

## 2025-04-08 NOTE — PRE-ANESTHESIA EVALUATION ADULT - NSPREOPDXFT_GEN_ALL_CORE
Peripheral Arterial Disease With B/L Iliofemoral Occlusion, ESRD, Post-Polio Lower Extremity Weakness
ESRD
sacral ulcer

## 2025-04-08 NOTE — PRE-ANESTHESIA EVALUATION ADULT - LAST ECHOCARDIOGRAM
2/17/25  normal L, R func, EF 65%

## 2025-04-08 NOTE — CONSULT NOTE ADULT - ATTENDING COMMENTS
CT shows fibrotic material, small, not concerning in the right IJ  no role for A/C  He can followup with Dr. Carlton for an outpatient R IJ duplex in a few weeks
Delayed entry. Patient examined by me at approximately 1615 on 4/6/25.    Remains afebrile, hemodynamically stable.   Moderate sized unstageable sacral decubitus ulcer. No fluctuance, purulent drainage or cellulitis.   Also with left medial thigh pressure wound covered with eschar. No evidence of active infection.   Multiple other ulcers over bilateral lower extremities all containing dry eschar as sequela of PAD.   Multiple bilateral toes with dry gangrene.     WBC 6.      A/P: Unstageable sacral decubitus and L thigh ulcers. No evidence of active infection. Patient remains non toxic with no evidence of sepsis necessitating urgent intervention at this time. Offered bedside debridement however patient refused. Plan for debridement in OR on 4/8 vs 4/9.  -Would not recommend debridement of bilateral lower leg dry gangrene or toes by general surgery at this time as these are non infected. Patient with hx of PAD s/p stenting 3/3/25. Defer to vascular surgery.
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  Requiring debridement of sacral wound.  Palliative consulted for goals of care and symptoms management.    Patient seen and examined at bedside. Patient shared that was diagnosed with polio at the age of 18 months old,  associated neurogenic bladder/suprapubic catheter and other several complications, however his main concern at the moment is uncontrol pain. Since being admitted he felt he has lost control and his pain is so severe that it causes him distress. He stated that his goal is to prolong life as long as possible, given he has a beautiful family who has been very supportive during this difficult moments. Discussed HCP, stated that he assigned his daughter Cori, advise to bring form.    Pain is uncontrolled, in the setting of pressure ulcers, and neurogenic bladder. Advised to continue oxycodone ER, start IV Dilaudid 2 mg q4hrs PRN for moderate pain and IV Dilaudid 3mg q4hrs PRN for severe pain. Consider pain management consult, given pain is unrelated with malignancy and patient will need close follow up with outpatient for pain management. Discussed with ACP and wife this morning.   Will continue to follow for ongoing goals of care.

## 2025-04-08 NOTE — PROGRESS NOTE ADULT - SUBJECTIVE AND OBJECTIVE BOX
Patient is a 75y old  Male who presents with a chief complaint of ESRD requiring HD, uremia (08 Apr 2025 13:10)      SUBJECTIVE / OVERNIGHT EVENTS: ptn is awaiting debridement in the OR today, debulking and wound care at the bedside has been challenging 2/2 pain not allowing 2/2 pain and fear of pain    MEDICATIONS  (STANDING):  albuterol/ipratropium for Nebulization 3 milliLiter(s) Nebulizer every 6 hours  aspirin  chewable 81 milliGRAM(s) Oral daily  atorvastatin 40 milliGRAM(s) Oral at bedtime  bacitracin/polymyxin B Ointment 1 Application(s) Topical two times a day  chlorhexidine 2% Cloths 1 Application(s) Topical daily  clopidogrel Tablet 75 milliGRAM(s) Oral daily  Dakins Solution - 1/4 Strength 1 Application(s) Topical every 8 hours  epoetin aleah-epbx (RETACRIT) Injectable 98208 Unit(s) IV Push <User Schedule>  heparin   Injectable 5000 Unit(s) SubCutaneous every 8 hours  hydrOXYzine hydrochloride 25 milliGRAM(s) Oral three times a day  ibuprofen  Tablet. 400 milliGRAM(s) Oral every 12 hours  lactobacillus acidophilus 1 Tablet(s) Oral two times a day with meals  mupirocin 2% Ointment 1 Application(s) Topical <User Schedule>  Nephro-margaret 1 Tablet(s) Oral daily  oxyCODONE  ER Tablet 30 milliGRAM(s) Oral every 12 hours  pantoprazole    Tablet 40 milliGRAM(s) Oral before breakfast  polyethylene glycol 3350 17 Gram(s) Oral daily  povidone iodine 10% Solution 1 Application(s) Topical two times a day  senna 2 Tablet(s) Oral at bedtime    MEDICATIONS  (PRN):  acetaminophen     Tablet .. 650 milliGRAM(s) Oral every 6 hours PRN Temp greater or equal to 38C (100.4F), Mild Pain (1 - 3)  benzocaine/menthol Lozenge 1 Lozenge Oral three times a day PRN Sore Throat  bisacodyl 5 milliGRAM(s) Oral every 12 hours PRN Constipation  HYDROmorphone  Injectable 2 milliGRAM(s) IV Push every 4 hours PRN Severe Pain (7 - 10)  oxyCODONE    IR 10 milliGRAM(s) Oral every 4 hours PRN Moderate Pain (4 - 6)  sodium chloride 0.9% lock flush 10 milliLiter(s) IV Push every 1 hour PRN Pre/post blood products, medications, blood draw, and to maintain line patency      Vital Signs Last 24 Hrs  T(F): 97.3 (04-08-25 @ 14:15), Max: 97.7 (04-08-25 @ 00:10)  HR: 79 (04-08-25 @ 14:15) (79 - 91)  BP: 128/74 (04-08-25 @ 14:15) (98/65 - 128/74)  RR: 18 (04-08-25 @ 14:15) (18 - 18)  SpO2: 95% (04-08-25 @ 14:15) (95% - 98%)  Telemetry:   CAPILLARY BLOOD GLUCOSE        I&O's Summary    07 Apr 2025 07:01  -  08 Apr 2025 07:00  --------------------------------------------------------  IN: 240 mL / OUT: 500 mL / NET: -260 mL        PHYSICAL EXAM:  GENERAL: NAD, well-developed  HEAD:  Atraumatic, Normocephalic  EYES: EOMI, PERRLA, conjunctiva and sclera clear  NECK: Supple, No JVD  CHEST/LUNG: Clear to auscultation bilaterally; No wheeze  HEART: Regular rate and rhythm; No murmurs, rubs, or gallops  ABDOMEN: Soft, Nontender, Nondistended; Bowel sounds present  EXTREMITIES:  2+ Peripheral Pulses, No clubbing, cyanosis, or edema  PSYCH: AAOx3  NEUROLOGY: non-focal  SKIN: No rashes or lesions    LABS:                        10.0   7.14  )-----------( 251      ( 08 Apr 2025 07:34 )             32.9     04-08    133[L]  |  98  |  33[H]  ----------------------------<  63[L]  4.3   |  22  |  3.13[H]    Ca    7.3[L]      08 Apr 2025 07:34      PT/INR - ( 08 Apr 2025 07:34 )   PT: 15.1 sec;   INR: 1.32 ratio         PTT - ( 08 Apr 2025 07:34 )  PTT:36.0 sec      Urinalysis Basic - ( 08 Apr 2025 07:34 )    Color: x / Appearance: x / SG: x / pH: x  Gluc: 63 mg/dL / Ketone: x  / Bili: x / Urobili: x   Blood: x / Protein: x / Nitrite: x   Leuk Esterase: x / RBC: x / WBC x   Sq Epi: x / Non Sq Epi: x / Bacteria: x        RADIOLOGY & ADDITIONAL TESTS:    Imaging Personally Reviewed:    Consultant(s) Notes Reviewed:      Care Discussed with Consultants/Other Providers:

## 2025-04-08 NOTE — PROGRESS NOTE ADULT - ASSESSMENT
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 The initial plan was that he would present to the [facility] ER Monday 2/17 for HD initiation. However, day of admission, he fell and sustained trauma to his knee. Called to consult for cough, elevated R hemidiaphragm.

## 2025-04-08 NOTE — CONSULT NOTE ADULT - PROBLEM SELECTOR RECOMMENDATION 4
Met with patient and wife this morning, who shared that prior to this admission he was wheelchair bound but reports he was independent.  Since being admitted he felt he has lost control and his pain is so severe that it causes him distress. He stated that his goal is to prolong life as long as possible, given he has a beautiful family who has been very supportive during this difficult moments    Discussed with patient regarding the importance of designating HCP. Shared with patient that a health care proxy is a legal document that appoints someone else to serve as your health care agent regarding healthcare decisions when you cannot do so for yourself. Patient stated that he assigned his daughter Cori, advise to bring form to the hospital.    Ongoing GOC discussion, please see above GOC note

## 2025-04-08 NOTE — CONSULT NOTE ADULT - NSCONSULTADDITIONALINFOA_GEN_ALL_CORE
- Counseling on diet, exercise, and medication adherence was done  - Counseling on smoking cessation and alcohol consumption offered when appropriate.  - Pain assessed and judicious use of narcotics when appropriate was discussed.    - Stroke education given when appropriate.  - Importance of fall prevention discussed.   - Differential diagnosis and plan of care discussed with patient and/or family and primary team
note is

## 2025-04-08 NOTE — PROGRESS NOTE ADULT - SUBJECTIVE AND OBJECTIVE BOX
Only tolerated net 0.5kg UF with HD yesterday      VITAL:  T(C): , Max: 36.5 (04-08-25 @ 00:10)  T(F): , Max: 97.7 (04-08-25 @ 00:10)  HR: 82 (04-08-25 @ 00:10)  BP: 109/67 (04-08-25 @ 00:10)  RR: 18 (04-08-25 @ 00:10)  SpO2: 98% (04-08-25 @ 00:10)      PHYSICAL EXAM:  Constitutional: in poor spirits  HEENT: NCAT, DMM  Neck: Supple, No JVD  Respiratory: CTA-b/l  Cardiovascular: RRR s1s2, no m/r/g  Gastrointestinal: BS+, soft, NT/ND  : (+)suprapubic cath  Extremities: 1+ b/l LE edema  Neurological: reduced generalized strength  Back: no CVAT b/l  Skin: gangrenous changes b/l feet  Access: RI tunneled cath      LABS:                        10.0   7.14  )-----------( 251      ( 08 Apr 2025 07:34 )             32.9     Na(133)/K(4.3)/Cl(98)/HCO3(22)/BUN(33)/Cr(3.13)Glu(63)/Ca(7.3)/Mg(--)/PO4(--)    04-08 @ 07:34  Na(134)/K(4.6)/Cl(98)/HCO3(22)/BUN(42)/Cr(3.94)Glu(95)/Ca(7.2)/Mg(--)/PO4(--)    04-07 @ 08:46  Na(135)/K(4.3)/Cl(101)/HCO3(20)/BUN(31)/Cr(3.36)Glu(73)/Ca(7.4)/Mg(--)/PO4(--)    04-06 @ 09:05  Na(137)/K(4.3)/Cl(101)/HCO3(25)/BUN(25)/Cr(2.83)Glu(68)/Ca(7.5)/Mg(--)/PO4(--)    04-05 @ 11:25      IMPRESSION: 74M w/ polio, neurogenic bladder/suprapubic catheter, 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 Due for next HD tomorrow    (2)Hyponatremia - mild/acceptable, with HD with high-Na+ bath    (3)Anemia - s/p IV iron; on Retacrit with HD    (4)PAD - s/p LE bypass 3/3/25     (5)CV - acceptable BP/volume    (6)Pain - in association with sacral ulceration, PAD    (7)GOC - failing to thrive/no obvious endpoint here. Patient with persistent pain; depression. Psych on board, input appreicated. If unable to improve further over next couple days, I will look to bring up Palliative therapy/Hospice to the family/patient.      RECOMMEND:   (1)Next HD tomorrow - 0.8kg UF as able  (2)Pain control per primary team  (3)If not improved over next couple days, I will reintroduce option of Hospice to patient/family              Rafita Denny MD  [facility]  Office/on call physician: [phone]  Cell (7a-7p): [phone]       Only tolerated net 0.5kg UF with HD yesterday  in better spirits today  agreeable to planning for a NYDIA close to home/where he could receive HD at [facility]    VITAL:  T(C): , Max: 36.5 (04-08-25 @ 00:10)  T(F): , Max: 97.7 (04-08-25 @ 00:10)  HR: 82 (04-08-25 @ 00:10)  BP: 109/67 (04-08-25 @ 00:10)  RR: 18 (04-08-25 @ 00:10)  SpO2: 98% (04-08-25 @ 00:10)      PHYSICAL EXAM:  Constitutional: NAD  HEENT: NCAT, DMM  Neck: Supple, No JVD  Respiratory: CTA-b/l  Cardiovascular: RRR s1s2, no m/r/g  Gastrointestinal: BS+, soft, NT/ND  : (+)suprapubic cath  Extremities: 1+ b/l LE edema  Neurological: reduced generalized strength  Back: no CVAT b/l  Skin: gangrenous changes b/l feet  Access: RIJ tunneled cath      LABS:                        10.0   7.14  )-----------( 251      ( 08 Apr 2025 07:34 )             32.9     Na(133)/K(4.3)/Cl(98)/HCO3(22)/BUN(33)/Cr(3.13)Glu(63)/Ca(7.3)/Mg(--)/PO4(--)    04-08 @ 07:34  Na(134)/K(4.6)/Cl(98)/HCO3(22)/BUN(42)/Cr(3.94)Glu(95)/Ca(7.2)/Mg(--)/PO4(--)    04-07 @ 08:46  Na(135)/K(4.3)/Cl(101)/HCO3(20)/BUN(31)/Cr(3.36)Glu(73)/Ca(7.4)/Mg(--)/PO4(--)    04-06 @ 09:05  Na(137)/K(4.3)/Cl(101)/HCO3(25)/BUN(25)/Cr(2.83)Glu(68)/Ca(7.5)/Mg(--)/PO4(--)    04-05 @ 11:25      IMPRESSION: 74M w/ polio, neurogenic bladder/suprapubic catheter, 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 Due for next HD tomorrow    (2)Hyponatremia - mild/acceptable, with HD with high-Na+ bath    (3)Anemia - s/p IV iron; on Retacrit with HD    (4)PAD - s/p LE bypass 3/3/25     (5)CV - acceptable BP/volume    (6)Pain - in association with sacral ulceration, PAD    (7)GOC - agreeable to NYDIA close to home/where he could receive HD at [facility]    RECOMMEND:   (1)Next HD tomorrow - 0.8kg UF as able  (2)Pain control per primary team  (3)NYDIA per             Rafita Denny MD  [facility]  Office/on call physician: [phone]  Cell (7a-7p): [phone]

## 2025-04-08 NOTE — PROGRESS NOTE ADULT - PROBLEM SELECTOR PLAN 1
CT chest with new RLL consolidation and LLL opacities, tracheal debris  -Suspect aspiration related s/p episode of vomiting  -S/p ABX as per ID  -Continue bronchodilators  -Keep sats >90% with o2 PRN  -Pt with low grade temp 3/30, dry cough. RVP negative, now afebrile. Continue to monitor fever curve  -Aspiration precautions.

## 2025-04-08 NOTE — PROGRESS NOTE ADULT - SUBJECTIVE AND OBJECTIVE BOX
INTERVAL HPI/OVERNIGHT EVENTS:    without new gi complaints   pending OR for decub debridement today     MEDICATIONS  (STANDING):  albuterol/ipratropium for Nebulization 3 milliLiter(s) Nebulizer every 6 hours  aspirin  chewable 81 milliGRAM(s) Oral daily  atorvastatin 40 milliGRAM(s) Oral at bedtime  bacitracin/polymyxin B Ointment 1 Application(s) Topical two times a day  chlorhexidine 2% Cloths 1 Application(s) Topical daily  clopidogrel Tablet 75 milliGRAM(s) Oral daily  Dakins Solution - 1/4 Strength 1 Application(s) Topical every 8 hours  epoetin aleah-epbx (RETACRIT) Injectable 75057 Unit(s) IV Push <User Schedule>  heparin   Injectable 5000 Unit(s) SubCutaneous every 8 hours  hydrOXYzine hydrochloride 25 milliGRAM(s) Oral three times a day  ibuprofen  Tablet. 400 milliGRAM(s) Oral every 12 hours  lactobacillus acidophilus 1 Tablet(s) Oral two times a day with meals  mupirocin 2% Ointment 1 Application(s) Topical <User Schedule>  Nephro-margaret 1 Tablet(s) Oral daily  oxyCODONE  ER Tablet 30 milliGRAM(s) Oral every 12 hours  pantoprazole    Tablet 40 milliGRAM(s) Oral before breakfast  polyethylene glycol 3350 17 Gram(s) Oral daily  povidone iodine 10% Solution 1 Application(s) Topical two times a day  senna 2 Tablet(s) Oral at bedtime    MEDICATIONS  (PRN):  acetaminophen     Tablet .. 650 milliGRAM(s) Oral every 6 hours PRN Temp greater or equal to 38C (100.4F), Mild Pain (1 - 3)  benzocaine/menthol Lozenge 1 Lozenge Oral three times a day PRN Sore Throat  bisacodyl 5 milliGRAM(s) Oral every 12 hours PRN Constipation  HYDROmorphone  Injectable 2 milliGRAM(s) IV Push every 4 hours PRN Severe Pain (7 - 10)  oxyCODONE    IR 10 milliGRAM(s) Oral every 4 hours PRN Moderate Pain (4 - 6)  sodium chloride 0.9% lock flush 10 milliLiter(s) IV Push every 1 hour PRN Pre/post blood products, medications, blood draw, and to maintain line patency      Allergies    No Known Allergies    Intolerances        Review of Systems:    General:  No wt loss, fevers, chills, night sweats, fatigue   Eyes:  Good vision, no reported pain  ENT:  No sore throat, pain, runny nose, dysphagia  CV:  No pain, palpitations, hypo/hypertension  Resp:  No dyspnea, cough, tachypnea, wheezing  GI:  No pain, No nausea, No vomiting, No diarrhea, No constipation, No weight loss, No fever, No pruritis, No rectal bleeding, No melena, No dysphagia  :  No pain, bleeding, incontinence, nocturia  Muscle:  No pain, weakness  Neuro:  No weakness, tingling, memory problems  Psych:  No fatigue, insomnia, mood problems, depression  Endocrine:  No polyuria, polydypsia, cold/heat intolerance  Heme:  No petechiae, ecchymosis, easy bruisability  Skin:  No rash, tattoos, scars, edema      Vital Signs Last 24 Hrs  T(C): 36.5 (08 Apr 2025 00:10), Max: 36.5 (08 Apr 2025 00:10)  T(F): 97.7 (08 Apr 2025 00:10), Max: 97.7 (08 Apr 2025 00:10)  HR: 82 (08 Apr 2025 00:10) (82 - 91)  BP: 109/67 (08 Apr 2025 00:10) (98/65 - 109/67)  BP(mean): --  RR: 18 (08 Apr 2025 00:10) (18 - 18)  SpO2: 98% (08 Apr 2025 00:10) (96% - 98%)    Parameters below as of 08 Apr 2025 00:10  Patient On (Oxygen Delivery Method): room air        PHYSICAL EXAM:    Constitutional: NAD  HEENT: EOMI, throat clear  Neck: No LAD, supple  Respiratory: CTA and P  Cardiovascular: S1 and S2, RRR, no M  Gastrointestinal: BS+, soft, NT/ND, neg HSM,  Extremities: No peripheral edema, neg clubbing, cyanosis  Vascular: 2+ peripheral pulses  Neurological: A/O   Psychiatric: Normal mood, normal affect  Skin: No rashes      LABS:                        10.0   7.14  )-----------( 251      ( 08 Apr 2025 07:34 )             32.9     04-08    133[L]  |  98  |  33[H]  ----------------------------<  63[L]  4.3   |  22  |  3.13[H]    Ca    7.3[L]      08 Apr 2025 07:34      PT/INR - ( 08 Apr 2025 07:34 )   PT: 15.1 sec;   INR: 1.32 ratio         PTT - ( 08 Apr 2025 07:34 )  PTT:36.0 sec  Urinalysis Basic - ( 08 Apr 2025 07:34 )    Color: x / Appearance: x / SG: x / pH: x  Gluc: 63 mg/dL / Ketone: x  / Bili: x / Urobili: x   Blood: x / Protein: x / Nitrite: x   Leuk Esterase: x / RBC: x / WBC x   Sq Epi: x / Non Sq Epi: x / Bacteria: x        RADIOLOGY & ADDITIONAL TESTS:

## 2025-04-08 NOTE — CHART NOTE - NSCHARTNOTEFT_GEN_A_CORE
This report was requested by: Corinna Lau | Reference #: [id number]      Prescription Information      PDI Filter:    PDI	My Rx	Current Rx	Drug Type	Rx Written	Rx Dispensed	Drug	Quantity	Days Supply	Prescriber Name	Prescriber ELIDIA #	Payment Method	Dispenser  A	N	N		11/29/2024	11/30/2024	zolpidem tartrate 5 mg tablet	20	10	Yevgeniy Mcdowell MD	[id number]	Medicare	[id number]  A	N	N	O	11/29/2024	11/30/2024	oxycodone hcl (ir) 10 mg tab	28	7	Yevgeniy Mcdowell MD	[id number]	Medicare	[id number]  A	N	N	O	11/21/2024	11/24/2024	oxycodone-acetaminophen 5-325 mg tablet	20	5	Rajesh Pozo Jr	[id number]	Medicare	[id number]  A	N	N	O	06/20/2024	06/26/2024	oxycodone hcl (ir) 5 mg tablet	28	7	Yevgeniy Mcdowell MD	[id number]	Medicare	[id number]

## 2025-04-08 NOTE — PRE PROCEDURE NOTE - GENERAL PROCEDURE NAME
Debridement of sacral decubitus ulcer
Tunneled HD catheter placement
aortagram, possible b/l iliac stent placement, possible fem-fem bypass
Debridement of sacral wound and left thigh wound

## 2025-04-08 NOTE — PROGRESS NOTE ADULT - PROBLEM SELECTOR PLAN 2
-CXR with elevated R hemidiaphragm (not present on CXR in December 2024)  -CT chest with LLL, RLL GGO, mucoid impacted airways. No clear PNA  -Suggest Duoneb q6h  -S/p Mucomyst x5 days   -Incentive spirometry   -CXR 3/1 with improved R hemidiaphragm, low lung volumes on L

## 2025-04-08 NOTE — CONSULT NOTE ADULT - PROBLEM SELECTOR RECOMMENDATION 9
multiple etiologies. greatest source of pain : sacral decub   chronic pain unrelated to a malignancy    -plan for OR for debridement   -oxy ER 30mg ER was started by primary team   -recommend acute pain consult for recs and further outpatient follow up    Palliative medicine recommendations:   -continue oxycodone ER 30mg BID   -iv dilaudid 2mg q4h prn moderate pain  -iv dilaudid 3 mg q4h prn severe pain Uncontrolled chronic pain, greatest source of pain is sacral pressure ulcer   - I-stop reviewed   - On oxy ER 30mg ER was started by primary team   -recommend pain management consult for recs and further outpatient follow up, pain not related with malignancy.   - Advise IV  Dilaudid 2mg q4h prn moderate pain. Hold for oversedation, lethargy, SBP<90 and RR less than 12   - Advise IV Dilaudid 3 mg q4h prn severe pain. Hold for oversedation, lethargy, SBP<90 and RR less than 12   - Bowel regimen while on opioids.  Monitor for constipation.  - Narcan PRN

## 2025-04-08 NOTE — CONSULT NOTE ADULT - SUBJECTIVE AND OBJECTIVE BOX
Date of Service:04-08-25 @ 15:06  HPI:  74 year-old man with history of multiple medical issues including polio with associated neurogenic bladder/suprapubic catheter, iatrogenic rectal rupture requiring colostomy 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 He is well known to me from multiple recent admisions  s/p admissions at [facility] 12/20-12/25/24 and 2/4-2/7 with SONDRA on CKD with associated uremic symptoms.   At the last admission he had expressed strong interest to try to hold off with HD intiation but he has been getting worse clinically at home.  His SONDRA and uremic symptoms significantly improved after the first admission; they improved a to mild extent during the 2nd admission to the point where he was able to be discharged without HD. Since then, however, he has worsened further.   He has been suffering from progressively worsening diffuse pruritus, loss of appetite, and generalized weakness and falls.   His nephrologist and PCP has been speaking with him and his family over the past few days,   The initial plan was that he would present to the [facility] ER Monday 2/17 (ie tomorrow) for HD initiation. Today, however, he fell and sustained trauma to his knee. Given the fall and concern for knee fracture, he presented to the ER today. multiple xrays show no Fractures. (16 Feb 2025 19:41)    Palliative HPI: new consult. pending OR for sacral debridement. no acute events overnight.  24h(9a-9a) prn medication use reviewed: po oxy ir 10 mg x 1 , iv dilaudid 2 mg x 4.  see GOC section below     PERTINENT PM/SXH:   Polio  Diabetes  Pulmonary hypertension  Hypertension  H/O urinary retention  HLD (hyperlipidemia)  BPH with obstruction/lower urinary tract symptoms  Type 2 diabetes mellitus  Erectile dysfunction  Bladder outlet obstruction  Presence of suprapubic catheter  Colostomy status  DVT, lower extremity  H/O cardiac murmur    No significant past surgical history  S/P colostomy  Suprapubic catheter  H/O total hip arthroplasty, right  Presence of internal fixation gianluca in upper extremity  S/P ORIF (open reduction internal fixation) fracture  S/P cataract surgery  H/O shoulder surgery    FAMILY HISTORY:  FH: type 2 diabetes    Family Hx substance abuse [ ]yes [ ]no  ITEMS NOT CHECKED ARE NOT PRESENT    SOCIAL HISTORY:   Significant other/partner[ x]  Children[x ]  Taoism/Spirituality:Yazidism   Substance hx:  [ ]   Tobacco hx:  [ ]   Alcohol hx: [ ]   Home Opioid hx:  [ x] I-Stop Reference No:[id number] (see chart note)   Living Situation: [ x]Home  [ ]Long term care  [ ]Rehab [ ]Other    ADVANCE DIRECTIVES:    DNR/MOLST  [ ]  Living Will  [ ]   DECISION MAKER(s):  [ x] Health Care Proxy(s)  [ ] Surrogate(s)  [ ] Guardian           Name(s): Phone Number(s): Cori (daughter)     BASELINE (I)ADL(s) (prior to admission):  [place]: [ ]Total  [x ] Moderate [ ]Dependent    Allergies    No Known Allergies    Intolerances    MEDICATIONS  (STANDING):  albuterol/ipratropium for Nebulization 3 milliLiter(s) Nebulizer every 6 hours  aspirin  chewable 81 milliGRAM(s) Oral daily  atorvastatin 40 milliGRAM(s) Oral at bedtime  bacitracin/polymyxin B Ointment 1 Application(s) Topical two times a day  chlorhexidine 2% Cloths 1 Application(s) Topical daily  clopidogrel Tablet 75 milliGRAM(s) Oral daily  Dakins Solution - 1/4 Strength 1 Application(s) Topical every 8 hours  epoetin aleah-epbx (RETACRIT) Injectable 12031 Unit(s) IV Push <User Schedule>  heparin   Injectable 5000 Unit(s) SubCutaneous every 8 hours  hydrOXYzine hydrochloride 25 milliGRAM(s) Oral three times a day  ibuprofen  Tablet. 400 milliGRAM(s) Oral every 12 hours  lactobacillus acidophilus 1 Tablet(s) Oral two times a day with meals  mupirocin 2% Ointment 1 Application(s) Topical <User Schedule>  Nephro-margaret 1 Tablet(s) Oral daily  oxyCODONE  ER Tablet 30 milliGRAM(s) Oral every 12 hours  pantoprazole    Tablet 40 milliGRAM(s) Oral before breakfast  polyethylene glycol 3350 17 Gram(s) Oral daily  povidone iodine 10% Solution 1 Application(s) Topical two times a day  senna 2 Tablet(s) Oral at bedtime    MEDICATIONS  (PRN):  acetaminophen     Tablet .. 650 milliGRAM(s) Oral every 6 hours PRN Temp greater or equal to 38C (100.4F), Mild Pain (1 - 3)  benzocaine/menthol Lozenge 1 Lozenge Oral three times a day PRN Sore Throat  bisacodyl 5 milliGRAM(s) Oral every 12 hours PRN Constipation  HYDROmorphone  Injectable 2 milliGRAM(s) IV Push every 4 hours PRN Severe Pain (7 - 10)  oxyCODONE    IR 10 milliGRAM(s) Oral every 4 hours PRN Moderate Pain (4 - 6)  sodium chloride 0.9% lock flush 10 milliLiter(s) IV Push every 1 hour PRN Pre/post blood products, medications, blood draw, and to maintain line patency    PRESENT SYMPTOMS: [ ]Unable to self-report  [ ] CPOT [ ] PAINADs [ ] RDOS  Source if other than patient:  [ ]Family   [ ]Team     Pain: [x ]yes [ ]no  QOL impact -   Location -  low back, legs, left hip                   Aggravating factors -laying in bed   Quality - burning   Radiation - none   Timing- constant   Severity (0-10 scale): 10  Minimal acceptable level (0-10 scale):    Dyspnea:                           [ ]Mild [ ]Moderate [ ]Severe  Anxiety:                             [ ]Mild [ ]Moderate [ ]Severe  Fatigue:                             [ ]Mild [ ]Moderate [ ]Severe  Nausea:                             [ ]Mild [ ]Moderate [ ]Severe  Loss of appetite:              [ ]Mild [ ]Moderate [ ]Severe  Constipation:                    [ ]Mild [ ]Moderate [ ]Severe    PCSSQ[Palliative Care Spiritual Screening Question]   Severity (0-10):  Score of 4 or > indicate consideration of Chaplaincy referral.    Chaplaincy Referral: [ ] yes [ ] refused [ ] following [x ] Deferred     Caregiver [place]? : [ ] yes [x ] no [ ] Deferred [ ] Declined             Social work referral [ ] Patient & Family Centered Care Referral [ ]     Anticipatory Grief present?:  [ ] yes [x ] no  [ ] Deferred                  Social work referral [ ] Chaplaincy Referral [ ]    		  Other Symptoms:  [ x]All other review of systems negative     Palliative Performance Status Version 2:   See PPSv2 tool and score below          PHYSICAL EXAM:  Vital Signs Last 24 Hrs  T(C): 36.3 (08 Apr 2025 14:15), Max: 36.5 (08 Apr 2025 00:10)  T(F): 97.3 (08 Apr 2025 14:15), Max: 97.7 (08 Apr 2025 00:10)  HR: 79 (08 Apr 2025 14:15) (79 - 91)  BP: 128/74 (08 Apr 2025 14:15) (98/65 - 128/74)  BP(mean): --  RR: 18 (08 Apr 2025 14:15) (18 - 18)  SpO2: 95% (08 Apr 2025 14:15) (95% - 98%)    Parameters below as of 08 Apr 2025 14:15  Patient On (Oxygen Delivery Method): room air     I&O's Summary    07 Apr 2025 07:01  -  08 Apr 2025 07:00  --------------------------------------------------------  IN: 240 mL / OUT: 500 mL / NET: -260 mL      GENERAL: [ ]Cachexia    [ ]Alert  [ ]Oriented x   [ ]Lethargic  [ ]Unarousable  [ ]Verbal  [ ]Non-Verbal  Behavioral:   [ ] Anxiety  [ ] Delirium [ ] Agitation [ ] Other  HEENT:  [ ]Normal   [ ]Dry mouth   [ ]ET Tube/Trach  [ ]Oral lesions  PULMONARY:   [ ]Clear [ ]Tachypnea  [ ]Audible excessive secretions   [ ]Rhonchi        [ ]Right [ ]Left [ ]Bilateral  [ ]Crackles        [ ]Right [ ]Left [ ]Bilateral  [ ]Wheezing     [ ]Right [ ]Left [ ]Bilateral  [ ]Diminished breath sounds [ ]right [ ]left [ ]bilateral  CARDIOVASCULAR:    [ ]Regular [ ]Irregular [ ]Tachy  [ ]Victor M [ ]Murmur [ ]Other  GASTROINTESTINAL:  [ ]Soft  [ ]Distended   [ ]+BS  [ ]Non tender [ ]Tender  [ ]Other [ ]PEG [ ]OGT/ NGT  Last BM:  GENITOURINARY:  [ ]Normal [ ] Incontinent   [ ]Oliguria/Anuria   [ ]Murillo  MUSCULOSKELETAL:   [ ]Normal   [ ]Weakness  [ ]Bed/Wheelchair bound [ ]Edema  NEUROLOGIC:   [ ]No focal deficits  [ ]Cognitive impairment  [ ]Dysphagia [ ]Dysarthria [ ]Paresis [ ]Other   SKIN:   [ ]Normal  [ ]Rash  [ ]Other  [ ]Pressure ulcer(s)       Present on admission [ ]y [ ]n    CRITICAL CARE:  [ ] Shock Present  [ ]Septic [ ]Cardiogenic [ ]Neurologic [ ]Hypovolemic  [ ]  Vasopressors [ ]  Inotropes   [ ]Respiratory failure present [ ]Mechanical ventilation [ ]Non-invasive ventilatory support [ ]High flow    [ ]Acute  [ ]Chronic [ ]Hypoxic  [ ]Hypercarbic [ ]Other  [ ]Other organ failure     LABS:                        10.0   7.14  )-----------( 251      ( 08 Apr 2025 07:34 )             32.9   04-08    133[L]  |  98  |  33[H]  ----------------------------<  63[L]  4.3   |  22  |  3.13[H]    Ca    7.3[L]      08 Apr 2025 07:34    PT/INR - ( 08 Apr 2025 07:34 )   PT: 15.1 sec;   INR: 1.32 ratio         PTT - ( 08 Apr 2025 07:34 )  PTT:36.0 sec    Urinalysis Basic - ( 08 Apr 2025 07:34 )    Color: x / Appearance: x / SG: x / pH: x  Gluc: 63 mg/dL / Ketone: x  / Bili: x / Urobili: x   Blood: x / Protein: x / Nitrite: x   Leuk Esterase: x / RBC: x / WBC x   Sq Epi: x / Non Sq Epi: x / Bacteria: x      RADIOLOGY & ADDITIONAL STUDIES:  < from: CT Abdomen and Pelvis w/ IV Cont (03.25.25 @ 13:43) >    FINDINGS:  CHEST:  LUNGS AND LARGE AIRWAYS: Patent central airways. Small amount of debris   within the trachea. Right lower lobe consolidative opacities. Additional   consolidative opacities in the left lower lobe, possibly atelectasis. A   few ill-defined nodular opacities in the left lower lobe measuring up to   6 mm.  PLEURA: Small left pleural effusion.  VESSELS: Right IJ approach catheter with tip in the superior cavoatrial   junction. Linear nonocclusive filling defect within the right internal   jugular vein (3:1). Atherosclerotic changes. Coronary artery   calcification.  HEART: Heart size is normal. Aortic valve and mitral annular   calcification. No pericardialeffusion.  MEDIASTINUM AND MARYANNE: No lymphadenopathy.  CHEST WALL AND LOWER NECK: Within normal limits.    ABDOMEN AND PELVIS:  LIVER: Subcentimeter hypodensity in the left lobe that is too small to   characterize.  BILE DUCTS: Normal caliber.  GALLBLADDER: Cholelithiasis.  SPLEEN: Within normal limits.  PANCREAS: Within normal limits.  ADRENALS: Within normal limits.  KIDNEYS/URETERS: Bilateral mild hydroureteronephrosis to the level of the   urinary bladder without evidence of obstructive urolithiasis, not   significantly changed from the prior exam of 2/20/2025. Diffuse   urothelial thickening of the bilateral ureters and collecting systems,   also similar to the previous CT. An exophytic hypodense right renal   lesion measuring 4.1 cm is unchanged.    BLADDER: Underdistended with a suprapubic catheter in place.  REPRODUCTIVE ORGANS: Enlarged prostate. Linear densities in the prostate,   possibly from previous UroLift procedure; correlate with the patient's   surgical history.    BOWEL:Rectal wall thickening. Sigmoid anastomosis. No evidence for bowel   obstruction. Normal appendix.  PERITONEUM/RETROPERITONEUM: Within normal limits.  VESSELS: Atherosclerotic changes. Bilateral common iliac/external iliac   artery stents. Previouslydescribed bilateral external iliac and   bilateral superficial femoral artery occlusions seen on the prior CTA is   not well assessed by this exam.  LYMPH NODES: Small left para-aortic lymph node measuring 1.1 x 0.9 cm,   unchanged.  ABDOMINAL WALL: Suprapubic catheter. Subcutaneous gas and subcutaneous   infiltration tracking through the right medial buttock. No discrete   associated collection within the field-of-view..  BONES: Degenerative changes. Bilateral femoral fixation hardware.    IMPRESSION:    CHEST:  *  Right lower lobe consolidative opacities, which may represent   pneumonia. Additional consolidative opacities in the left lower lobe,   potentially atelectasis or infection. A few ill-defined nodular opacities   in the left lower lobe measuring up to 6 mm may also be   infectious/inflammatory in etiology. Suggest continued attention on   follow-up imaging.  *  Small amount of debris in the trachea.  *  Small left pleural effusion.  *  Linear nonocclusive filling defect within the right internal jugular   vein, which may represent residual fibrin sheath or nonocclusive thrombus.    ABDOMEN AND PELVIS:  *  Subcutaneous gas and infiltration tracking along the medial right   buttock, potentially related to a decubitus wound with gas tracking from   the external environment. A necrotizing airforming infection is also a   possibility. Correlate with physical exam.  *  Rectal wall thickening, suggestive of proctitis.  *  Bilateral mild hydroureteronephrosis to the level of the urinary   bladder without evidence of obstructive urolithiasis, and not   significantly changed since 2/20/2025.  *  Diffuse urothelial thickening of the bilateral renal collecting   systems and ureters, which may be seen with an ascending urinary tract   infection or inflammation, similar to the previous CT 2/20/2025.    Findings were discussed with Dr. NELL TOLBERT 3/25/2025 4:30 PM by   Dr. Calzada.    --- End of Report ---    < end of copied text >      PROTEIN CALORIE MALNUTRITION PRESENT: [ ]mild [ ]moderate [ ]severe [ ]underweight [ ]morbid obesity  https://www.andeal.org/vault/2440/web/files/ONC/Table_Clinical%20Characteristics%20to%20Document%20Malnutrition-White%20JV%20et%20al%202012.pdf    Height (cm): 172.7 (03-03-25 @ 10:32), 172.7 (02-04-25 @ 16:31), 172.7 (12-20-24 @ 12:36)  Weight (kg): 90.7 (03-03-25 @ 10:32), 90.7 (02-04-25 @ 16:31), 94.3 (12-20-24 @ 12:36)  BMI (kg/m2): 30.4 (03-03-25 @ 10:32), 30.4 (02-04-25 @ 16:31), 31.6 (12-20-24 @ 12:36)    [ ]PPSV2 < or = to 30% [ ]significant weight loss  [ ]poor nutritional intake  [ ]anasarca[ ]Artificial Nutrition      Other REFERRALS:  [ ]Hospice  [ ]Child Life  [ ]Social Work  [ ]Case management [ ]Holistic Therapy     Goals of Care Document:  Date of Service:04-08-25 @ 15:06  HPI:  74 year-old man with history of multiple medical issues including polio with associated neurogenic bladder/suprapubic catheter, iatrogenic rectal rupture requiring colostomy 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 He is well known to me from multiple recent admisions  s/p admissions at [facility] 12/20-12/25/24 and 2/4-2/7 with SONDRA on CKD with associated uremic symptoms.   At the last admission he had expressed strong interest to try to hold off with HD intiation but he has been getting worse clinically at home.  His SONDRA and uremic symptoms significantly improved after the first admission; they improved a to mild extent during the 2nd admission to the point where he was able to be discharged without HD. Since then, however, he has worsened further.   He has been suffering from progressively worsening diffuse pruritus, loss of appetite, and generalized weakness and falls.   His nephrologist and PCP has been speaking with him and his family over the past few days,   The initial plan was that he would present to the [facility] ER Monday 2/17 (ie tomorrow) for HD initiation. Today, however, he fell and sustained trauma to his knee. Given the fall and concern for knee fracture, he presented to the ER today. multiple xrays show no Fractures. (16 Feb 2025 19:41)    Palliative HPI: new consult. pending OR for sacral debridement. no acute events overnight.  24h(9a-9a) prn medication use reviewed: po oxy ir 10 mg x 1 , iv Dilaudid 2 mg x 4.  see GOC section below     PERTINENT PM/SXH:   Polio  Diabetes  Pulmonary hypertension  Hypertension  H/O urinary retention  HLD (hyperlipidemia)  BPH with obstruction/lower urinary tract symptoms  Type 2 diabetes mellitus  Erectile dysfunction  Bladder outlet obstruction  Presence of suprapubic catheter  Colostomy status  DVT, lower extremity  H/O cardiac murmur    No significant past surgical history  S/P colostomy  Suprapubic catheter  H/O total hip arthroplasty, right  Presence of internal fixation gianluca in upper extremity  S/P ORIF (open reduction internal fixation) fracture  S/P cataract surgery  H/O shoulder surgery    FAMILY HISTORY:  FH: type 2 diabetes    Family Hx substance abuse [ ]yes [ ]no  ITEMS NOT CHECKED ARE NOT PRESENT    SOCIAL HISTORY:   Significant other/partner[ x]  Children[x ] 4 Bahai/Spirituality:Druze   Substance hx:  [ ]   Tobacco hx:  [ ]   Alcohol hx: [ ]   Home Opioid hx:  [ x] I-Stop Reference No:[id number] (see chart note)   Living Situation: [ x]Home  [ ]Long term care  [ ]Rehab [ ]Other    ADVANCE DIRECTIVES:    DNR/MOLST  [ ]  Living Will  [ ]   DECISION MAKER(s):  [ x] Health Care Proxy(s)  [ ] Surrogate(s)  [ ] Guardian           Name(s): Phone Number(s): Cori (daughter)     BASELINE (I)ADL(s) (prior to admission):  [place]: [ ]Total  [x ] Moderate [ ]Dependent    Allergies    No Known Allergies    Intolerances    MEDICATIONS  (STANDING):  albuterol/ipratropium for Nebulization 3 milliLiter(s) Nebulizer every 6 hours  aspirin  chewable 81 milliGRAM(s) Oral daily  atorvastatin 40 milliGRAM(s) Oral at bedtime  bacitracin/polymyxin B Ointment 1 Application(s) Topical two times a day  chlorhexidine 2% Cloths 1 Application(s) Topical daily  clopidogrel Tablet 75 milliGRAM(s) Oral daily  Dakins Solution - 1/4 Strength 1 Application(s) Topical every 8 hours  epoetin aleah-epbx (RETACRIT) Injectable 02879 Unit(s) IV Push <User Schedule>  heparin   Injectable 5000 Unit(s) SubCutaneous every 8 hours  hydrOXYzine hydrochloride 25 milliGRAM(s) Oral three times a day  ibuprofen  Tablet. 400 milliGRAM(s) Oral every 12 hours  lactobacillus acidophilus 1 Tablet(s) Oral two times a day with meals  mupirocin 2% Ointment 1 Application(s) Topical <User Schedule>  Nephro-margaret 1 Tablet(s) Oral daily  oxyCODONE  ER Tablet 30 milliGRAM(s) Oral every 12 hours  pantoprazole    Tablet 40 milliGRAM(s) Oral before breakfast  polyethylene glycol 3350 17 Gram(s) Oral daily  povidone iodine 10% Solution 1 Application(s) Topical two times a day  senna 2 Tablet(s) Oral at bedtime    MEDICATIONS  (PRN):  acetaminophen     Tablet .. 650 milliGRAM(s) Oral every 6 hours PRN Temp greater or equal to 38C (100.4F), Mild Pain (1 - 3)  benzocaine/menthol Lozenge 1 Lozenge Oral three times a day PRN Sore Throat  bisacodyl 5 milliGRAM(s) Oral every 12 hours PRN Constipation  HYDROmorphone  Injectable 2 milliGRAM(s) IV Push every 4 hours PRN Severe Pain (7 - 10)  oxyCODONE    IR 10 milliGRAM(s) Oral every 4 hours PRN Moderate Pain (4 - 6)  sodium chloride 0.9% lock flush 10 milliLiter(s) IV Push every 1 hour PRN Pre/post blood products, medications, blood draw, and to maintain line patency    PRESENT SYMPTOMS: [ ]Unable to self-report  [ ] CPOT [ ] PAINADs [ ] RDOS  Source if other than patient:  [ ]Family   [ ]Team     Pain: [x ]yes [ ]no  QOL impact - unable to perform ALDs or rest   Location -  low back, legs, left hip                   Aggravating factors -laying in bed   Quality - burning   Radiation - none   Timing- constant   Severity (0-10 scale): 10  Minimal acceptable level (0-10 scale):    Dyspnea:                           [ ]Mild [ ]Moderate [ ]Severe  Anxiety:                             [ ]Mild [ ]Moderate [ ]Severe  Fatigue:                             [ ]Mild [ ]Moderate [ ]Severe  Nausea:                             [ ]Mild [ ]Moderate [ ]Severe  Loss of appetite:              [ ]Mild [ ]Moderate [ ]Severe  Constipation:                    [ ]Mild [ ]Moderate [ ]Severe    PCSSQ[Palliative Care Spiritual Screening Question]   Severity (0-10):  Score of 4 or > indicate consideration of Chaplaincy referral.    Chaplaincy Referral: [ ] yes [ ] refused [ ] following [x ] Deferred     Caregiver [place]? : [ ] yes [x ] no [ ] Deferred [ ] Declined             Social work referral [ ] Patient & Family Centered Care Referral [ ]     Anticipatory Grief present?:  [ ] yes [x ] no  [ ] Deferred                  Social work referral [ ] Chaplaincy Referral [ ]    		  Other Symptoms:  [ x]All other review of systems negative     Palliative Performance Status Version 2:   See PPSv2 tool and score below          PHYSICAL EXAM:  Vital Signs Last 24 Hrs  T(C): 36.3 (08 Apr 2025 14:15), Max: 36.5 (08 Apr 2025 00:10)  T(F): 97.3 (08 Apr 2025 14:15), Max: 97.7 (08 Apr 2025 00:10)  HR: 79 (08 Apr 2025 14:15) (79 - 91)  BP: 128/74 (08 Apr 2025 14:15) (98/65 - 128/74)  BP(mean): --  RR: 18 (08 Apr 2025 14:15) (18 - 18)  SpO2: 95% (08 Apr 2025 14:15) (95% - 98%)    Parameters below as of 08 Apr 2025 14:15  Patient On (Oxygen Delivery Method): room air     I&O's Summary    07 Apr 2025 07:01  -  08 Apr 2025 07:00  --------------------------------------------------------  IN: 240 mL / OUT: 500 mL / NET: -260 mL      GENERAL: [ ]Cachexia    [x ]Alert  [x ]Oriented x 4  [ ]Lethargic  [ ]Unarousable  [x ]Verbal  [ ]Non-Verbal  Behavioral:   [ ] Anxiety  [ ] Delirium [ ] Agitation [x ] Other: calm   HEENT:  [ x]Normal   [ ]Dry mouth   [ ]ET Tube/Trach  [ ]Oral lesions  PULMONARY:   [ ]Clear [ ]Tachypnea  [ ]Audible excessive secretions   [ ]Rhonchi        [ ]Right [ ]Left [ ]Bilateral  [ ]Crackles        [ ]Right [ ]Left [ ]Bilateral  [ ]Wheezing     [ ]Right [ ]Left [ ]Bilateral  [x ]Diminished breath sounds [ ]right [ ]left [ x]bilateral  CARDIOVASCULAR:    [ x]Regular [ ]Irregular [ ]Tachy  [ ]Victor M [ ]Murmur [ ]Other  GASTROINTESTINAL:  [ ]Soft  [ x]Distended   [ ]+BS  [x ]Non tender [ ]Tender  [ ]Other [ ]PEG [ ]OGT/ NGT  Last BM: 4/6  GENITOURINARY:  [ ]Normal [ ] Incontinent   [ ]Oliguria/Anuria   [ ]Murillo [x] suprapubic cath   MUSCULOSKELETAL:   [ ]Normal   [ ]Weakness  [x ]Bed/Wheelchair bound [ ]Edema  NEUROLOGIC:   [x ]No focal deficits  [ ]Cognitive impairment  [ ]Dysphagia [ ]Dysarthria [ ]Paresis [ ]Other   SKIN: Please see RN documentation which I have reviewed.  Sacral, Rt Ischial, and Left Buttock wounds  BLE severe PAD w/ extensive wounds   [ ]Normal  [ ]Rash  [ ]Other  [x ]Pressure ulcer(s)       Present on admission [x ]y [ ]n    CRITICAL CARE:  [ ] Shock Present  [ ]Septic [ ]Cardiogenic [ ]Neurologic [ ]Hypovolemic  [ ]  Vasopressors [ ]  Inotropes   [ ]Respiratory failure present [ ]Mechanical ventilation [ ]Non-invasive ventilatory support [ ]High flow    [ ]Acute  [ ]Chronic [ ]Hypoxic  [ ]Hypercarbic [ ]Other  [ x]Other organ failure: skin     LABS:                        10.0   7.14  )-----------( 251      ( 08 Apr 2025 07:34 )             32.9   04-08    133[L]  |  98  |  33[H]  ----------------------------<  63[L]  4.3   |  22  |  3.13[H]    Ca    7.3[L]      08 Apr 2025 07:34    PT/INR - ( 08 Apr 2025 07:34 )   PT: 15.1 sec;   INR: 1.32 ratio         PTT - ( 08 Apr 2025 07:34 )  PTT:36.0 sec    Urinalysis Basic - ( 08 Apr 2025 07:34 )    Color: x / Appearance: x / SG: x / pH: x  Gluc: 63 mg/dL / Ketone: x  / Bili: x / Urobili: x   Blood: x / Protein: x / Nitrite: x   Leuk Esterase: x / RBC: x / WBC x   Sq Epi: x / Non Sq Epi: x / Bacteria: x      RADIOLOGY & ADDITIONAL STUDIES:  CT Abdomen and Pelvis w/ IV Cont (03.25.25 @ 13:43) >    FINDINGS:  CHEST:  LUNGS AND LARGE AIRWAYS: Patent central airways. Small amount of debris   within the trachea. Right lower lobe consolidative opacities. Additional   consolidative opacities in the left lower lobe, possibly atelectasis. A   few ill-defined nodular opacities in the left lower lobe measuring up to   6 mm.  PLEURA: Small left pleural effusion.  VESSELS: Right IJ approach catheter with tip in the superior cavoatrial   junction. Linear nonocclusive filling defect within the right internal   jugular vein (3:1). Atherosclerotic changes. Coronary artery   calcification.  HEART: Heart size is normal. Aortic valve and mitral annular   calcification. No pericardialeffusion.  MEDIASTINUM AND MARYANNE: No lymphadenopathy.  CHEST WALL AND LOWER NECK: Within normal limits.    ABDOMEN AND PELVIS:  LIVER: Subcentimeter hypodensity in the left lobe that is too small to   characterize.  BILE DUCTS: Normal caliber.  GALLBLADDER: Cholelithiasis.  SPLEEN: Within normal limits.  PANCREAS: Within normal limits.  ADRENALS: Within normal limits.  KIDNEYS/URETERS: Bilateral mild hydroureteronephrosis to the level of the   urinary bladder without evidence of obstructive urolithiasis, not   significantly changed from the prior exam of 2/20/2025. Diffuse   urothelial thickening of the bilateral ureters and collecting systems,   also similar to the previous CT. An exophytic hypodense right renal   lesion measuring 4.1 cm is unchanged.    BLADDER: Underdistended with a suprapubic catheter in place.  REPRODUCTIVE ORGANS: Enlarged prostate. Linear densities in the prostate,   possibly from previous UroLift procedure; correlate with the patient's   surgical history.    BOWEL:Rectal wall thickening. Sigmoid anastomosis. No evidence for bowel   obstruction. Normal appendix.  PERITONEUM/RETROPERITONEUM: Within normal limits.  VESSELS: Atherosclerotic changes. Bilateral common iliac/external iliac   artery stents. Previouslydescribed bilateral external iliac and   bilateral superficial femoral artery occlusions seen on the prior CTA is   not well assessed by this exam.  LYMPH NODES: Small left para-aortic lymph node measuring 1.1 x 0.9 cm,   unchanged.  ABDOMINAL WALL: Suprapubic catheter. Subcutaneous gas and subcutaneous   infiltration tracking through the right medial buttock. No discrete   associated collection within the field-of-view..  BONES: Degenerative changes. Bilateral femoral fixation hardware.    IMPRESSION:    CHEST:  *  Right lower lobe consolidative opacities, which may represent   pneumonia. Additional consolidative opacities in the left lower lobe,   potentially atelectasis or infection. A few ill-defined nodular opacities   in the left lower lobe measuring up to 6 mm may also be   infectious/inflammatory in etiology. Suggest continued attention on   follow-up imaging.  *  Small amount of debris in the trachea.  *  Small left pleural effusion.  *  Linear nonocclusive filling defect within the right internal jugular   vein, which may represent residual fibrin sheath or nonocclusive thrombus.    ABDOMEN AND PELVIS:  *  Subcutaneous gas and infiltration tracking along the medial right   buttock, potentially related to a decubitus wound with gas tracking from   the external environment. A necrotizing airforming infection is also a   possibility. Correlate with physical exam.  *  Rectal wall thickening, suggestive of proctitis.  *  Bilateral mild hydroureteronephrosis to the level of the urinary   bladder without evidence of obstructive urolithiasis, and not   significantly changed since 2/20/2025.  *  Diffuse urothelial thickening of the bilateral renal collecting   systems and ureters, which may be seen with an ascending urinary tract   infection or inflammation, similar to the previous CT 2/20/2025.        PROTEIN CALORIE MALNUTRITION PRESENT: [ ]mild [ ]moderate [ ]severe [ ]underweight [ ]morbid obesity  https://www.andeal.org/vault/2440/web/files/ONC/Table_Clinical%20Characteristics%20to%20Document%20Malnutrition-White%20JV%20et%20al%202012.pdf    Height (cm): 172.7 (03-03-25 @ 10:32), 172.7 (02-04-25 @ 16:31), 172.7 (12-20-24 @ 12:36)  Weight (kg): 90.7 (03-03-25 @ 10:32), 90.7 (02-04-25 @ 16:31), 94.3 (12-20-24 @ 12:36)  BMI (kg/m2): 30.4 (03-03-25 @ 10:32), 30.4 (02-04-25 @ 16:31), 31.6 (12-20-24 @ 12:36)    [ ]PPSV2 < or = to 30% [ ]significant weight loss  [ ]poor nutritional intake  [ ]anasarca[ ]Artificial Nutrition      Other REFERRALS:  [ ]Hospice  [ ]Child Life  [ ]Social Work  [x]Case management [ ]Holistic Therapy                                        Progress Notes    PROGRESS NOTE  Date & Time of Note   2025-04-08 11:20    Notes    Notes: Chart reviewed and noted. Per medical IDR rounds, pt remains acute on HD  q Mon, Wed and Fri via right chest wall catheter. Pt continues with multiple  wounds and a suprapubic catheter in place. Pt is scheduled for OR debridement  of sacral ulcer today 4.8. Pt is pending possible palliative GOC conversation.     Anticipated discharge needs remains [facility] with HD vs home P/T. Pt has been  accepted to [facility] with HD when medically cleared. If pt chooses home, pt has  also been accepted at [facility] for outpatient hemodialysis. Pt  would require transportation arranged for first session. Case management will  continue to collaborate with interdisciplinary team to assess needs and remains  available.       Electronically signed by:  Kalyani Marrufo  Electronically signed on:  2025-04-08  11:23  Goals of Care Document:

## 2025-04-08 NOTE — PROGRESS NOTE ADULT - SUBJECTIVE AND OBJECTIVE BOX
Date of Service: 04-08-25 @ 09:57    Patient is a 75y old  Male who presents with a chief complaint of ESRD requiring HD, uremia (08 Apr 2025 09:13)      Any change in ROS:   No new respiratory events overnight. Denies SOB/CP.  OR today for sacral wound debridement     MEDICATIONS  (STANDING):  albuterol/ipratropium for Nebulization 3 milliLiter(s) Nebulizer every 6 hours  aspirin  chewable 81 milliGRAM(s) Oral daily  atorvastatin 40 milliGRAM(s) Oral at bedtime  bacitracin/polymyxin B Ointment 1 Application(s) Topical two times a day  chlorhexidine 2% Cloths 1 Application(s) Topical daily  clopidogrel Tablet 75 milliGRAM(s) Oral daily  Dakins Solution - 1/4 Strength 1 Application(s) Topical every 8 hours  epoetin aleah-epbx (RETACRIT) Injectable 29010 Unit(s) IV Push <User Schedule>  heparin   Injectable 5000 Unit(s) SubCutaneous every 8 hours  hydrOXYzine hydrochloride 25 milliGRAM(s) Oral three times a day  ibuprofen  Tablet. 400 milliGRAM(s) Oral every 12 hours  lactobacillus acidophilus 1 Tablet(s) Oral two times a day with meals  mupirocin 2% Ointment 1 Application(s) Topical <User Schedule>  Nephro-margaret 1 Tablet(s) Oral daily  oxyCODONE  ER Tablet 30 milliGRAM(s) Oral every 12 hours  pantoprazole    Tablet 40 milliGRAM(s) Oral before breakfast  polyethylene glycol 3350 17 Gram(s) Oral daily  povidone iodine 10% Solution 1 Application(s) Topical two times a day  senna 2 Tablet(s) Oral at bedtime    MEDICATIONS  (PRN):  acetaminophen     Tablet .. 650 milliGRAM(s) Oral every 6 hours PRN Temp greater or equal to 38C (100.4F), Mild Pain (1 - 3)  benzocaine/menthol Lozenge 1 Lozenge Oral three times a day PRN Sore Throat  bisacodyl 5 milliGRAM(s) Oral every 12 hours PRN Constipation  HYDROmorphone  Injectable 2 milliGRAM(s) IV Push every 4 hours PRN Severe Pain (7 - 10)  oxyCODONE    IR 10 milliGRAM(s) Oral every 4 hours PRN Moderate Pain (4 - 6)  sodium chloride 0.9% lock flush 10 milliLiter(s) IV Push every 1 hour PRN Pre/post blood products, medications, blood draw, and to maintain line patency    Vital Signs Last 24 Hrs  T(C): 36.5 (08 Apr 2025 00:10), Max: 36.5 (08 Apr 2025 00:10)  T(F): 97.7 (08 Apr 2025 00:10), Max: 97.7 (08 Apr 2025 00:10)  HR: 82 (08 Apr 2025 00:10) (82 - 91)  BP: 109/67 (08 Apr 2025 00:10) (98/65 - 111/42)  BP(mean): --  RR: 18 (08 Apr 2025 00:10) (18 - 18)  SpO2: 98% (08 Apr 2025 00:10) (92% - 98%)    Parameters below as of 08 Apr 2025 00:10  Patient On (Oxygen Delivery Method): room air        I&O's Summary    07 Apr 2025 07:01  -  08 Apr 2025 07:00  --------------------------------------------------------  IN: 240 mL / OUT: 500 mL / NET: -260 mL          Physical Exam:   GENERAL: NAD, well-groomed, well-developed  HEENT: VINNY/   Atraumatic, Normocephalic  ENMT: No tonsillar erythema, exudates, or enlargement; Moist mucous membranes, Good dentition, No lesions  NECK: Supple, No JVD, Normal thyroid  CHEST/LUNG: Decreased at bases   CVS: Regular rate and rhythm; No murmurs, rubs, or gallops  GI: : Soft, Nontender, Nondistended; Bowel sounds present  NERVOUS SYSTEM:  Alert & Oriented X3   EXTREMITIES: LE wounds   LYMPH: No lymphadenopathy noted  SKIN: No rashes or lesions  ENDOCRINOLOGY: No Thyromegaly  PSYCH: Appropriate    Labs:                              10.0   7.14  )-----------( 251      ( 08 Apr 2025 07:34 )             32.9                         9.3    7.32  )-----------( 284      ( 07 Apr 2025 08:46 )             30.2                         10.9   6.03  )-----------( 269      ( 06 Apr 2025 09:05 )             35.7                         9.3    6.30  )-----------( 258      ( 05 Apr 2025 11:25 )             31.3     04-08    133[L]  |  98  |  33[H]  ----------------------------<  63[L]  4.3   |  22  |  3.13[H]  04-07    134[L]  |  98  |  42[H]  ----------------------------<  95  4.6   |  22  |  3.94[H]  04-06    135  |  101  |  31[H]  ----------------------------<  73  4.3   |  20[L]  |  3.36[H]  04-05    137  |  101  |  25[H]  ----------------------------<  68[L]  4.3   |  25  |  2.83[H]    Ca    7.3[L]      08 Apr 2025 07:34  Ca    7.2[L]      07 Apr 2025 08:46      CAPILLARY BLOOD GLUCOSE            PT/INR - ( 08 Apr 2025 07:34 )   PT: 15.1 sec;   INR: 1.32 ratio         PTT - ( 08 Apr 2025 07:34 )  PTT:36.0 sec  Urinalysis Basic - ( 08 Apr 2025 07:34 )    Color: x / Appearance: x / SG: x / pH: x  Gluc: 63 mg/dL / Ketone: x  / Bili: x / Urobili: x   Blood: x / Protein: x / Nitrite: x   Leuk Esterase: x / RBC: x / WBC x   Sq Epi: x / Non Sq Epi: x / Bacteria: x        Studies  < from: CT Chest w/ IV Cont (03.25.25 @ 13:43) >    ACC: [id number] EXAM:  CT CHEST IC   ORDERED BY:  NELL TOLBERT     ACC: [id number] EXAM:  CT ABDOMEN AND PELVIS IC   ORDERED BY:  NELL TOLBERT     PROCEDURE DATE:  03/25/2025          INTERPRETATION:  CLINICAL INFORMATION: Evaluate for pneumonia. Abdominal   pain.    COMPARISON: CT chest 2/23/2025. CT abdomen and pelvis 2/20/2025.    CONTRAST/COMPLICATIONS:  IV Contrast: Omnipaque 350  90 cc administered   10 cc discarded  Oral Contrast: NONE    PROCEDURE:  CT of the Chest, Abdomen and Pelvis was performed.  Sagittal and coronal reformats were performed.    FINDINGS:  CHEST:  LUNGS AND LARGE AIRWAYS: Patent central airways. Small amount of debris   within the trachea. Right lower lobe consolidative opacities. Additional   consolidative opacities in the left lower lobe, possibly atelectasis. A   few ill-defined nodular opacities in the left lower lobe measuring up to   6 mm.  PLEURA: Small left pleural effusion.  VESSELS: Right IJ approach catheter with tip in the superior cavoatrial   junction. Linear nonocclusive filling defect within the right internal   jugular vein (3:1). Atherosclerotic changes. Coronary artery   calcification.  HEART: Heart size is normal. Aortic valve and mitral annular   calcification. No pericardialeffusion.  MEDIASTINUM AND MARYANNE: No lymphadenopathy.  CHEST WALL AND LOWER NECK: Within normal limits.    ABDOMEN AND PELVIS:  LIVER: Subcentimeter hypodensity in the left lobe that is too small to   characterize.  BILE DUCTS: Normal caliber.  GALLBLADDER: Cholelithiasis.  SPLEEN: Within normal limits.  PANCREAS: Within normal limits.  ADRENALS: Within normal limits.  KIDNEYS/URETERS: Bilateral mild hydroureteronephrosis to the level of the   urinary bladder without evidence of obstructive urolithiasis, not   significantly changed from the prior exam of 2/20/2025. Diffuse   urothelial thickening of the bilateral ureters and collecting systems,   also similar to the previous CT. An exophytic hypodense right renal   lesion measuring 4.1 cm is unchanged.    BLADDER: Underdistended with a suprapubic catheter in place.  REPRODUCTIVE ORGANS: Enlarged prostate. Linear densities in the prostate,   possibly from previous UroLift procedure; correlate with the patient's   surgical history.    BOWEL:Rectal wall thickening. Sigmoid anastomosis. No evidence for bowel   obstruction. Normal appendix.  PERITONEUM/RETROPERITONEUM: Within normal limits.  VESSELS: Atherosclerotic changes. Bilateral common iliac/external iliac   artery stents. Previouslydescribed bilateral external iliac and   bilateral superficial femoral artery occlusions seen on the prior CTA is   not well assessed by this exam.  LYMPH NODES: Small left para-aortic lymph node measuring 1.1 x 0.9 cm,   unchanged.  ABDOMINAL WALL: Suprapubic catheter. Subcutaneous gas and subcutaneous   infiltration tracking through the right medial buttock. No discrete   associated collection within the field-of-view..  BONES: Degenerative changes. Bilateral femoral fixation hardware.    IMPRESSION:    CHEST:  *  Right lower lobe consolidative opacities, which may represent   pneumonia. Additional consolidative opacities in the left lower lobe,   potentially atelectasis or infection. A few ill-defined nodular opacities   in the left lower lobe measuring up to 6 mm may also be   infectious/inflammatory in etiology. Suggest continued attention on   follow-up imaging.  *  Small amount of debris in the trachea.  *  Small left pleural effusion.  *  Linear nonocclusive filling defect within the right internal jugular   vein, which may represent residual fibrin sheath or nonocclusive thrombus.    ABDOMEN AND PELVIS:  *  Subcutaneous gas and infiltration tracking along the medial right   buttock, potentially related to a decubitus wound with gas tracking from   the external environment. A necrotizing airforming infection is also a   possibility. Correlate with physical exam.  *  Rectal wall thickening, suggestive of proctitis.  *  Bilateral mild hydroureteronephrosis to the level of the urinary   bladder without evidence of obstructive urolithiasis, and not   significantly changed since 2/20/2025.  *  Diffuse urothelial thickening of the bilateral renal collecting   systems and ureters, which may be seen with an ascending urinary tract   infection or inflammation, similar to the previous CT 2/20/2025.    Findings were discussed with Dr. NELL TOLBERT 3/25/2025 4:30 PM by   Dr. Calzada.    --- End of Report ---      < end of copied text >

## 2025-04-08 NOTE — CONSULT NOTE ADULT - PROBLEM SELECTOR RECOMMENDATION 3
[facility] team consulted for pain management.     Consider pain management consult given patient will need long term pain management - he was diagnosed with polio at the age of 18 months old,  associated neurogenic bladder/suprapubic catheter and other several complications

## 2025-04-08 NOTE — CONSULT NOTE ADULT - PROBLEM SELECTOR RECOMMENDATION 2
states his daughter Cori is his health care proxy - asked to provide documentation Sacral and Rt Ischial Unstageable Pressure Injury  Left Buttock DTI  BLE severe PAD w/ extensive wounds    - plan for OR for debridement   - Wound care following   - Advise to premedicate with IV Dilaudid 15 min prior of wound clean   - management as per primary team

## 2025-04-08 NOTE — CONSULT NOTE ADULT - PROBLEM SELECTOR RECOMMENDATION 5
Palliative care team consulted for goals of care and symptoms management.   Case reviewed with primary team  Ruiz ZACARIAS consulted

## 2025-04-09 NOTE — PROGRESS NOTE ADULT - PROBLEM SELECTOR PLAN 2
Sacral and Rt Ischial Unstageable Pressure Injury  Left Buttock DTI  BLE severe PAD w/ extensive wounds    - s/p OR for debridement on 4/8  - Wound care following   - Advise to premedicate with IV Dilaudid 15 min prior of wound clean   - management as per primary team.

## 2025-04-09 NOTE — CONSULT NOTE ADULT - CONSULT REQUESTED DATE/TIME
24-Mar-2025 21:15
10-Mar-2025 13:12
17-Feb-2025 08:35
26-Mar-2025 10:36
23-Feb-2025 19:01
16-Feb-2025 20:21
24-Feb-2025 11:23
05-Mar-2025 16:32
09-Apr-2025 11:37
16-Feb-2025 16:26
21-Feb-2025 14:20
06-Apr-2025 14:39
06-Mar-2025 12:05
17-Feb-2025 17:09
22-Feb-2025 10:00
07-Apr-2025 11:33

## 2025-04-09 NOTE — PROGRESS NOTE ADULT - SUBJECTIVE AND OBJECTIVE BOX
Date of Service: 04-09-25 @ 15:29    Patient is a 75y old  Male who presents with a chief complaint of ESRD requiring HD, uremia (09 Apr 2025 12:36)      Any change in ROS: having pain in the leg  : s/p wound debridement:   no sob:  has some cough :  no resp distress:  remains on room air     MEDICATIONS  (STANDING):  aspirin  chewable 81 milliGRAM(s) Oral daily  atorvastatin 40 milliGRAM(s) Oral at bedtime  bacitracin/polymyxin B Ointment 1 Application(s) Topical two times a day  chlorhexidine 2% Cloths 1 Application(s) Topical daily  clopidogrel Tablet 75 milliGRAM(s) Oral daily  Dakins Solution - 1/4 Strength 1 Application(s) Topical every 8 hours  epoetin aleah-epbx (RETACRIT) Injectable 54802 Unit(s) IV Push <User Schedule>  gabapentin 300 milliGRAM(s) Oral at bedtime  heparin   Injectable 5000 Unit(s) SubCutaneous every 8 hours  hydrOXYzine hydrochloride 25 milliGRAM(s) Oral three times a day  ibuprofen  Tablet. 400 milliGRAM(s) Oral every 12 hours  lactobacillus acidophilus 1 Tablet(s) Oral two times a day with meals  meropenem  IVPB 500 milliGRAM(s) IV Intermittent every 24 hours  methocarbamol 750 milliGRAM(s) Oral every 8 hours  mupirocin 2% Ointment 1 Application(s) Topical <User Schedule>  Nephro-margaret 1 Tablet(s) Oral daily  oxyCODONE  ER Tablet 20 milliGRAM(s) Oral every 8 hours  pantoprazole    Tablet 40 milliGRAM(s) Oral before breakfast  polyethylene glycol 3350 17 Gram(s) Oral daily  povidone iodine 10% Solution 1 Application(s) Topical two times a day  senna 2 Tablet(s) Oral at bedtime    MEDICATIONS  (PRN):  benzocaine/menthol Lozenge 1 Lozenge Oral three times a day PRN Sore Throat  bisacodyl 5 milliGRAM(s) Oral every 12 hours PRN Constipation  HYDROmorphone  Injectable 1 milliGRAM(s) IV Push every 6 hours PRN severe breakthrough pain  oxyCODONE    IR 10 milliGRAM(s) Oral every 4 hours PRN Severe Pain (7 - 10)  sodium chloride 0.9% lock flush 10 milliLiter(s) IV Push every 1 hour PRN Pre/post blood products, medications, blood draw, and to maintain line patency    Vital Signs Last 24 Hrs  T(C): 36 (09 Apr 2025 11:00), Max: 36.8 (08 Apr 2025 20:40)  T(F): 96.8 (09 Apr 2025 11:00), Max: 98.3 (08 Apr 2025 20:40)  HR: 80 (09 Apr 2025 11:00) (78 - 89)  BP: 120/68 (09 Apr 2025 11:00) (92/50 - 166/70)  BP(mean): 72 (08 Apr 2025 20:00) (59 - 106)  RR: 18 (09 Apr 2025 11:00) (15 - 18)  SpO2: 95% (09 Apr 2025 11:00) (93% - 100%)    Parameters below as of 09 Apr 2025 11:00  Patient On (Oxygen Delivery Method): room air        I&O's Summary    08 Apr 2025 07:01  -  09 Apr 2025 07:00  --------------------------------------------------------  IN: 240 mL / OUT: 400 mL / NET: -160 mL    09 Apr 2025 07:01  -  09 Apr 2025 15:29  --------------------------------------------------------  IN: 0 mL / OUT: 0 mL / NET: 0 mL          Physical Exam:   GENERAL: NAD, well-groomed, well-developed  HEENT: VINNY/   Atraumatic, Normocephalic  ENMT: No tonsillar erythema, exudates, or enlargement; Moist mucous membranes, Good dentition, No lesions  NECK: Supple, No JVD, Normal thyroid  CHEST/LUNG: Clear to auscultaion  CVS: Regular rate and rhythm; No murmurs, rubs, or gallops  GI: : Soft, Nontender, Nondistended; Bowel sounds present  NERVOUS SYSTEM:  Alert & Oriented X3  EXTREMITIES:  gangrenous digits  LYMPH: No lymphadenopathy noted  SKIN: No rashes or lesions  ENDOCRINOLOGY: No Thyromegaly  PSYCH: Appropriate    Labs:                              10.0   7.14  )-----------( 251      ( 08 Apr 2025 07:34 )             32.9                         9.3    7.32  )-----------( 284      ( 07 Apr 2025 08:46 )             30.2                         10.9   6.03  )-----------( 269      ( 06 Apr 2025 09:05 )             35.7     04-09    134[L]  |  96  |  43[H]  ----------------------------<  56[L]  4.5   |  22  |  3.72[H]  04-08    133[L]  |  98  |  33[H]  ----------------------------<  63[L]  4.3   |  22  |  3.13[H]  04-07    134[L]  |  98  |  42[H]  ----------------------------<  95  4.6   |  22  |  3.94[H]  04-06    135  |  101  |  31[H]  ----------------------------<  73  4.3   |  20[L]  |  3.36[H]    Ca    7.2[L]      09 Apr 2025 09:41  Ca    7.3[L]      08 Apr 2025 07:34      CAPILLARY BLOOD GLUCOSE            PT/INR - ( 08 Apr 2025 07:34 )   PT: 15.1 sec;   INR: 1.32 ratio         PTT - ( 08 Apr 2025 07:34 )  PTT:36.0 sec  Urinalysis Basic - ( 09 Apr 2025 09:41 )    Color: x / Appearance: x / SG: x / pH: x  Gluc: 56 mg/dL / Ketone: x  / Bili: x / Urobili: x   Blood: x / Protein: x / Nitrite: x   Leuk Esterase: x / RBC: x / WBC x   Sq Epi: x / Non Sq Epi: x / Bacteria: x            RECENT CULTURES:  04-08 @ 20:58 Other Sacral Wound Swab       rad< from: CT Abdomen and Pelvis w/ IV Cont (03.25.25 @ 13:43) >  vein, which may represent residual fibrin sheath or nonocclusive thrombus.    ABDOMEN AND PELVIS:  *  Subcutaneous gas and infiltration tracking along the medial right   buttock, potentially related to a decubitus wound with gas tracking from   the external environment. A necrotizing airforming infection is also a   possibility. Correlate with physical exam.  *  Rectal wall thickening, suggestive of proctitis.  *  Bilateral mild hydroureteronephrosis to the level of the urinary   bladder without evidence of obstructive urolithiasis, and not   significantly changed since 2/20/2025.  *  Diffuse urothelial thickening of the bilateral renal collecting   systems and ureters, which may be seen with an ascending urinary tract   infection or inflammation, similar to the previous CT 2/20/2025.    Findings were discussed with Dr. NELL TOLBERT 3/25/2025 4:30 PM by   Dr. Calzada.    --- End of Report ---    < end of copied text >           Testing in progress          RESPIRATORY CULTURES:          Studies  Chest X-RAY  CT SCAN Chest   Venous Dopplers: LE:   CT Abdomen  Others

## 2025-04-09 NOTE — CONSULT NOTE ADULT - ASSESSMENT
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     EMR reviewed, patient admitted 52 days ago s/p fall, knee trauma, and need to start HD. Patient w/ acute pain managed by primary team with escalating high dose opioids. Called for assistance in managing uncontrolled pain 2/2 multiple wounds, PAD and gangrenous toes despite high dose opioids.   Hospital course includes RLE angiogram w/ b/l iliac artery stents 3/3, PNA, RIJ DVT, Hypotension, pressure ulcers sacrum, buttocks, B/L heels, posterior left thigh, wound infections, likely sacral OM, 4/8 s/p wound debridement. Pt refusing diverting colostomy.    Current out- patient pain regimen: None  Out Patient Pain Management provider: None  Pain Scores: 10/10 to 0/10    Pt states pain regimen helps but doesn't last. Pain is generalized, specifically mentions SPT discomfort and neuropathic pain and spasm B/L LE starts at feet and radiates proximally. Also c/o wound pain.    Plan discussed with primary team:  OxyContin 20 mg Q8h  Oxycodone 10 mg Q4h PRN severe pain  IV Dilaudid 1 mg Q6h PRN severe breakthrough pain  Robaxin 750 mg Q8h  Gabapentin 300 mg QHS (CrCl = 22.3 max daily dose is 700 mg)  Consider adding Tylenol around the clock  Ibuprofen per Nephrology    Warm/cool packs for comfort  Continue Bowel Regimen  Incentive Spirometer  PT per primary team  Monitor for sedation, respiratory depression  [facility] on board ([facility]iatric and Palliative Care) for GOC in this patient with advanced illness  Out-patient pain practice list to be provided for pain management after discharge  Narcan Rescue Kit on discharge (Naloxone 4 mg/0.1 ml nasal spray - 1 spray q 2-3 minutes alternating between nostrils)    Time spent on encounter:    80    Minutes    [facility] Pain [facility]  [phone]

## 2025-04-09 NOTE — PROGRESS NOTE ADULT - PROBLEM SELECTOR PLAN 2
-CXR with elevated R hemidiaphragm (not present on CXR in December 2024)  -CT chest with LLL, RLL GGO, mucoid impacted airways. No clear PNA  -Suggest Duoneb q6h  -S/p Mucomyst x5 days   -Incentive spirometry   -CXR 3/1 with improved R hemidiaphragm, low lung volumes on L  -rpt chest xray today

## 2025-04-09 NOTE — CONSULT NOTE ADULT - PROVIDER SPECIALTY LIST ADULT
Infectious Disease
Nephrology
Surgery
Vascular Surgery
Podiatry
Gastroenterology
Wound Care
Pain Medicine
Rehab Medicine
Vascular Cardiology
Neurology
Intervent Radiology
Orthopedics
Pulmonology
Cardiology
Palliative Care

## 2025-04-09 NOTE — PROGRESS NOTE ADULT - PROBLEM SELECTOR PLAN 1
Uncontrolled chronic pain, greatest source of pain is sacral pressure ulcer   - Required oxycodone IR 10 mg x1 and IV Dilaudid 2mg x3 within 24hrs period 8am-8am  - On oxy ER 30mg ER was started by primary team   - Advise IV  Dilaudid 2mg q4h prn moderate pain. Hold for oversedation, lethargy, SBP<90 and RR less than 12   - Advise IV Dilaudid 3 mg q4h prn severe pain. Hold for oversedation, lethargy, SBP<90 and RR less than 12   - Bowel regimen while on opioids.  Monitor for constipation.  - Pain management consulted.  recommendations appreciated.  - Narcan PRN.

## 2025-04-09 NOTE — PROGRESS NOTE ADULT - SUBJECTIVE AND OBJECTIVE BOX
Overnight events noted      VITAL:  T(C): , Max: 36.8 (04-08-25 @ 20:40)  T(F): , Max: 98.3 (04-08-25 @ 20:40)  HR: 84 (04-09-25 @ 06:20)  BP: 126/71 (04-09-25 @ 06:20)  BP(mean): 72 (04-08-25 @ 20:00)  RR: 18 (04-09-25 @ 06:20)  SpO2: 93% (04-09-25 @ 06:20)  Wt(kg): --      PHYSICAL EXAM:  Constitutional: in poor spirits  HEENT: NCAT, DMM  Neck: Supple, No JVD  Respiratory: CTA-b/l  Cardiovascular: RRR s1s2, no m/r/g  Gastrointestinal: BS+, soft, NT/ND  : (+)suprapubic cath  Extremities: 1+ b/l LE edema  Neurological: reduced generalized strength  Back: no CVAT b/l  Skin: gangrenous changes b/l feet  Access: [facility] tunneled cath    LABS:                        10.0   7.14  )-----------( 251      ( 08 Apr 2025 07:34 )             32.9     Na(133)/K(4.3)/Cl(98)/HCO3(22)/BUN(33)/Cr(3.13)Glu(63)/Ca(7.3)/Mg(--)/PO4(--)    04-08 @ 07:34  Na(134)/K(4.6)/Cl(98)/HCO3(22)/BUN(42)/Cr(3.94)Glu(95)/Ca(7.2)/Mg(--)/PO4(--)    04-07 @ 08:46  Na(135)/K(4.3)/Cl(101)/HCO3(20)/BUN(31)/Cr(3.36)Glu(73)/Ca(7.4)/Mg(--)/PO4(--)    04-06 @ 09:05      IMPRESSION: 74M w/ polio, neurogenic bladder/suprapubic catheter, 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 Due for next HD today    (2)Hyponatremia - receiving high-Na+ bath with HD    (3)Anemia - s/p IV iron; on Retacrit with HD    (4)PAD - s/p LE bypass 3/3/25     (5)CV - acceptable BP/volume    (6)Pain - in association with sacral ulceration, PAD      RECOMMEND:   (1)HD today as ordered  (2)Pain control per primary team                  Rafita Denny MD  [facility]  Office/on call physician: [phone]  Cell (7a-7p): [phone]       seen on HD - tenuous hemodyanmics  (+)fatigue; no pain      VITAL:  T(C): , Max: 36.8 (04-08-25 @ 20:40)  T(F): , Max: 98.3 (04-08-25 @ 20:40)  HR: 84 (04-09-25 @ 06:20)  BP: 126/71 (04-09-25 @ 06:20)  BP(mean): 72 (04-08-25 @ 20:00)  RR: 18 (04-09-25 @ 06:20)  SpO2: 93% (04-09-25 @ 06:20)  Wt(kg): --      PHYSICAL EXAM:  Constitutional: lethargic but alert, NAD  HEENT: NCAT, DMM  Neck: Supple, No JVD  Respiratory: CTA-b/l  Cardiovascular: RRR s1s2, no m/r/g  Gastrointestinal: BS+, soft, NT/ND  : (+)suprapubic cath  Extremities: 1+ b/l LE edema  Neurological: reduced generalized strength  Back: no CVAT b/l  Skin: gangrenous changes b/l feet  Access: RIJ tunneled cath-accessed    LABS:                        10.0   7.14  )-----------( 251      ( 08 Apr 2025 07:34 )             32.9     Na(133)/K(4.3)/Cl(98)/HCO3(22)/BUN(33)/Cr(3.13)Glu(63)/Ca(7.3)/Mg(--)/PO4(--)    04-08 @ 07:34  Na(134)/K(4.6)/Cl(98)/HCO3(22)/BUN(42)/Cr(3.94)Glu(95)/Ca(7.2)/Mg(--)/PO4(--)    04-07 @ 08:46  Na(135)/K(4.3)/Cl(101)/HCO3(20)/BUN(31)/Cr(3.36)Glu(73)/Ca(7.4)/Mg(--)/PO4(--)    04-06 @ 09:05      IMPRESSION: 74M w/ polio, neurogenic bladder/suprapubic catheter, 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 On HD now    (2)Hyponatremia - receiving high-Na+ bath with HD    (3)Anemia - s/p IV iron; on Retacrit with HD    (4)PAD - s/p LE bypass 3/3/25     (5)CV - tenuous hemodynamics at onset of HD today - indicated for Midodrine    (6)Pain - in association with sacral ulceration, PAD      RECOMMEND:   (1)Midodrine 10mg po x 1 now  (2)1L UF with HD today as able                Rafita Denny MD  [facility]  Office/on call physician: [phone]  Cell (7a-7p): [phone]

## 2025-04-09 NOTE — CONSULT NOTE ADULT - SUBJECTIVE AND OBJECTIVE BOX
Chief Complaint:  Patient is a 75y old  Male who presents with a chief complaint of ESRD requiring HD, uremia (09 Apr 2025 11:32)    HPI:  75M with history of multiple medical issues including polio with associated neurogenic bladder/suprapubic catheter, iatrogenic rectal rupture requiring colostomy 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 Admissions at [facility] 12/20-12/25/24 and 2/4-2/7 with SONDRA on CKD with associated uremic symptoms, pt w/ strong interest to try to hold off with HD intiation but he has been getting worse clinically at home. He has been suffering from progressively worsening diffuse pruritus, loss of appetite, and generalized weakness and falls. Plan was to present to ER for HD initiation but pt fell and sustained left knee fracture.     Current out- patient pain regimen: None    Out Patient Pain Management provider: None    [facility] Prescription Monitoring Program: Reference #[id number]    Opioid Risk Tool (ORT-OUD) Score: Low    Pain Score: 10/10 to 0/10    EMR reviewed, patient admitted 52 days ago s/p fall, knee trauma, and need to start HD. Patient w/ acute pain managed by primary team with escalating high dose opioids. Called for assistance in managing uncontrolled pain 2/2 multiple wounds, PAD and gangrenous toes.   Hospital course includes RLE angiogram w/ b/l iliac artery stents 3/3, PNA, RIJ DVT, Hypotension, pressure ulcers sacrum, buttocks, B/L heels, posterior left thigh, wound infections, likely sacral OM, 4/8 s/p wound debridement. Pt refusing diverting colostomy.    Pt lying in bed, appears comfortable. Pt detailed clinical history, states pain regimen helps but doesn't last. Pain is generalized, specifically mentions SPT discomfort and neuropathic pain and spasm B/L LE starts at feet and radiates proximally. Also c/o wound pain.    Pt educated re: benefits of PO over IV opioids; need to find an effective oral regimen in preparation for discharge; goal of pain management to find effective oral regimen so that pain is tolerable and patient can function.    REVIEW OF SYSTEMS:  CONSTITUTIONAL: No weight loss, (+) fatigue, (+) falls  NEURO: No headaches, memory loss, tremors, dizziness or blurred vision  GI: No abdominal pain, nausea, vomiting, constipation  : No urinary incontinence/retention  SKIN: Pressure ulcers sacrum, buttocks, B/L heels, posterior left thigh  PSYCHIATRIC: No depression, anxiety, mood swings      PHYSICAL EXAM  GENERAL: Seen at bedside, NAD, well-groomed, well-developed, appears stated age, no signs of toxicity  NEURO: Alert & Oriented X3, Good concentration; Follows commands; sensory exam decreased B/L LE L>R  HEENT: Head atraumatic, normocephalic; speech clear and fluent  GI: Appetite fair, (+)BM  : Voiding   EXTREMITIES: B/L LE (+)edema, (+)gangrenous toes, dressings C/D/I  PSYCH: affect normal; good eye contact; no signs of depression or anxiety      PAST MEDICAL & SURGICAL HISTORY:  Polio      Hypertension      H/O urinary retention  suprapubic tube      HLD (hyperlipidemia)      BPH with obstruction/lower urinary tract symptoms      Erectile dysfunction      Bladder outlet obstruction      Presence of suprapubic catheter      Colostomy status      DVT, lower extremity      H/O cardiac murmur      S/P colostomy  2/2024      Suprapubic catheter      S/P ORIF (open reduction internal fixation) fracture  b/l legs      S/P cataract surgery  right      H/O shoulder surgery          FAMILY HISTORY:  FH: type 2 diabetes        Allergies    No Known Allergies        MEDICATIONS  (STANDING):  aspirin  chewable 81 milliGRAM(s) Oral daily  atorvastatin 40 milliGRAM(s) Oral at bedtime  bacitracin/polymyxin B Ointment 1 Application(s) Topical two times a day  chlorhexidine 2% Cloths 1 Application(s) Topical daily  clopidogrel Tablet 75 milliGRAM(s) Oral daily  Dakins Solution - 1/4 Strength 1 Application(s) Topical every 8 hours  epoetin aleah-epbx (RETACRIT) Injectable 55712 Unit(s) IV Push <User Schedule>  gabapentin 300 milliGRAM(s) Oral at bedtime  heparin   Injectable 5000 Unit(s) SubCutaneous every 8 hours  hydrOXYzine hydrochloride 25 milliGRAM(s) Oral three times a day  ibuprofen  Tablet. 400 milliGRAM(s) Oral every 12 hours  lactobacillus acidophilus 1 Tablet(s) Oral two times a day with meals  meropenem  IVPB 500 milliGRAM(s) IV Intermittent every 24 hours  methocarbamol 750 milliGRAM(s) Oral every 8 hours  mupirocin 2% Ointment 1 Application(s) Topical <User Schedule>  Nephro-margaret 1 Tablet(s) Oral daily  oxyCODONE  ER Tablet 20 milliGRAM(s) Oral every 8 hours  pantoprazole    Tablet 40 milliGRAM(s) Oral before breakfast  polyethylene glycol 3350 17 Gram(s) Oral daily  povidone iodine 10% Solution 1 Application(s) Topical two times a day  senna 2 Tablet(s) Oral at bedtime    MEDICATIONS  (PRN):  benzocaine/menthol Lozenge 1 Lozenge Oral three times a day PRN Sore Throat  bisacodyl 5 milliGRAM(s) Oral every 12 hours PRN Constipation  HYDROmorphone  Injectable 1 milliGRAM(s) IV Push every 6 hours PRN severe breakthrough pain  oxyCODONE    IR 10 milliGRAM(s) Oral every 4 hours PRN Severe Pain (7 - 10)  sodium chloride 0.9% lock flush 10 milliLiter(s) IV Push every 1 hour PRN Pre/post blood products, medications, blood draw, and to maintain line patency      Vital Signs:  T(C): 36 (04-09-25 @ 11:00)  HR: 80 (04-09-25 @ 11:00)  BP: 120/68 (04-09-25 @ 11:00)  RR: 18 (04-09-25 @ 11:00)  SpO2: 95% (04-09-25 @ 11:00)    Pertinent labs/radiology:  Reviewed                          10.0   7.14  )-----------( 251      ( 08 Apr 2025 07:34 )             32.9       04-09    134[L]  |  96  |  43[H]  ----------------------------<  56[L]  4.5   |  22  |  3.72[H]    Ca    7.2[L]      09 Apr 2025 09:41

## 2025-04-09 NOTE — PROGRESS NOTE ADULT - SUBJECTIVE AND OBJECTIVE BOX
Subjective: Patient seen and examined. No new events except as noted.   Pt seen at HD    REVIEW OF SYSTEMS:  unable to obtain     MEDICATIONS:  MEDICATIONS  (STANDING):  aspirin  chewable 81 milliGRAM(s) Oral daily  atorvastatin 40 milliGRAM(s) Oral at bedtime  bacitracin/polymyxin B Ointment 1 Application(s) Topical two times a day  chlorhexidine 2% Cloths 1 Application(s) Topical daily  clopidogrel Tablet 75 milliGRAM(s) Oral daily  Dakins Solution - 1/4 Strength 1 Application(s) Topical every 8 hours  epoetin aleah-epbx (RETACRIT) Injectable 47046 Unit(s) IV Push <User Schedule>  heparin   Injectable 5000 Unit(s) SubCutaneous every 8 hours  hydrOXYzine hydrochloride 25 milliGRAM(s) Oral three times a day  ibuprofen  Tablet. 400 milliGRAM(s) Oral every 12 hours  lactobacillus acidophilus 1 Tablet(s) Oral two times a day with meals  mupirocin 2% Ointment 1 Application(s) Topical <User Schedule>  Nephro-margaret 1 Tablet(s) Oral daily  oxyCODONE  ER Tablet 30 milliGRAM(s) Oral every 12 hours  pantoprazole    Tablet 40 milliGRAM(s) Oral before breakfast  polyethylene glycol 3350 17 Gram(s) Oral daily  povidone iodine 10% Solution 1 Application(s) Topical two times a day  senna 2 Tablet(s) Oral at bedtime      PHYSICAL EXAM:  T(C): 36 (04-09-25 @ 08:00), Max: 36.8 (04-08-25 @ 20:40)  HR: 81 (04-09-25 @ 08:00) (78 - 89)  BP: 92/50 (04-09-25 @ 08:00) (92/50 - 166/70)  RR: 18 (04-09-25 @ 08:00) (15 - 18)  SpO2: 94% (04-09-25 @ 08:00) (93% - 100%)  Wt(kg): --  I&O's Summary    08 Apr 2025 07:01  -  09 Apr 2025 07:00  --------------------------------------------------------  IN: 240 mL / OUT: 400 mL / NET: -160 mL      Height (cm): 172.7 (04-08 @ 15:25)  Weight (kg): 90.7 (04-08 @ 15:25)  BMI (kg/m2): 30.4 (04-08 @ 15:25)  BSA (m2): 2.04 (04-08 @ 15:25)    Appearance: NAD  HEENT:  Dry oral mucosa, PERRL, EOMI	  Lymphatic: No lymphadenopathy  Cardiovascular: Normal S1 S2, No JVD, No murmurs, No edema  Respiratory: Lungs clear to auscultation	  Psychiatry: A & O x 3, Mood & affect appropriate  Skin: No rashes, No ecchymoses, No cyanosis	  Neurologic: Non-focal  GI/Abd: Soft, obese, nontender. Dressing to Q C/D/I. Suprapubic catheter in place  Ext: Palp femoral pulses bilat. BLE atrophic, minimal dorsi/plantarflexion bilaterally. Sensation grossly intact. LLE edematous compared to R, erythema to right toes/forefoot. mottling of right toes/forefoot/heel  LLE:  small superficial abrasion, +edema and +ecchymosis over L knee  +TTP over L knee, no TTP along remainder of extremity; compartments soft  Limited ROM at knee 2/2 pain  No gross varus/valgus laxity, but assessment limited 2/2 pain  Motor: TA/EHL/GS/FHL intact  Sensory: DP/SP/Tib/Jordan/Saph SILT  +DP pulse (symmetric relative to contralateral side), WWP   pressure wound from the LLE immobilizer posteriorly proximal to the knee.  Vascular: Peripheral pulses palpable 2+ bilaterally        LABS:    CARDIAC MARKERS:                                10.0   7.14  )-----------( 251      ( 08 Apr 2025 07:34 )             32.9     04-09    134[L]  |  96  |  43[H]  ----------------------------<  56[L]  4.5   |  22  |  3.72[H]    Ca    7.2[L]      09 Apr 2025 09:41      proBNP:   Lipid Profile:   HgA1c:   TSH:             TELEMETRY: 	    ECG:  	  RADIOLOGY:   DIAGNOSTIC TESTING:  [ ] Echocardiogram:  [ ]  Catheterization:  [ ] Stress Test:    OTHER:

## 2025-04-09 NOTE — CHART NOTE - NSCHARTNOTEFT_GEN_A_CORE
Patient is POD#1 from sacral wound debridement and L posterior thigh wound debridement. Patient was seen and examined in the AM. Attempted to do the dressing change in the AM however patient was sleeping/resting and did not want to the wounds to be evaluated at that time.    Patient was seen again in the PM. Patient stated multiple times that he does not want his wounds to be evaluated at this time, as he is in too much pain and that he would like to rest. Patient was offered additional pain medication just prior to the dressing change, however, he refused despite explanation that it is important to see how the wound is healing and that if the same dressing stays for a prolonged period of time it is an infection risk. Patient stated that his wife would take care of the wound.     The ACS team will try again tomorrow to evaluate the wound and do the dressing change.    Trauma -[id number]

## 2025-04-09 NOTE — PROGRESS NOTE ADULT - ASSESSMENT
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  Requiring debridement of sacral wound.  Palliative following for goals of care

## 2025-04-09 NOTE — PROGRESS NOTE ADULT - SUBJECTIVE AND OBJECTIVE BOX
[facility]  SABINO Griffin Y. Patel, S. Shah, G. Casimir  [phone]  ([phone] - weekdays after 5pm and weekends)    Name: BRIAN DEMPSEY  Age/Gender: 75y Male  MRN: [id number]    Interval History:  Patient seen and examined this morning at HD.   States pain is controlled, no fevers or other complaints.   States he does not want to be moved too much today.   Notes reviewed, s/p OR yesterday.   No concerning overnight events  Afebrile. HD RN at bedside.    Allergies: No Known Allergies      Objective:  Vitals:   T(F): 96.8 (04-09-25 @ 11:00), Max: 98.3 (04-08-25 @ 20:40)  HR: 80 (04-09-25 @ 11:00) (78 - 89)  BP: 120/68 (04-09-25 @ 11:00) (92/50 - 166/70)  RR: 18 (04-09-25 @ 11:00) (15 - 18)  SpO2: 95% (04-09-25 @ 11:00) (93% - 100%)  Physical Examination:  General: no acute distress, on HD, nontoxic   HEENT: normocephalic, atraumatic, anicteric  Respiratory: no acc muscle use, breathing comfortably  Cardiovascular: S1 and S2 present  Gastrointestinal: normal appearing, nondistended  Extremities: b/l LE dressing, necrotic toes     Laboratory Studies:  CBC:                       10.0   7.14  )-----------( 251      ( 08 Apr 2025 07:34 )             32.9     WBC Trend:  7.14 04-08-25 @ 07:34  7.32 04-07-25 @ 08:46  6.03 04-06-25 @ 09:05  6.30 04-05-25 @ 11:25    CMP: 04-09    134[L]  |  96  |  43[H]  ----------------------------<  56[L]  4.5   |  22  |  3.72[H]    Ca    7.2[L]      09 Apr 2025 09:41    Creatinine: 3.72 mg/dL (04-09-25 @ 09:41)  Creatinine: 3.13 mg/dL (04-08-25 @ 07:34)  Creatinine: 3.94 mg/dL (04-07-25 @ 08:46)  Creatinine: 3.36 mg/dL (04-06-25 @ 09:05)  Creatinine: 2.83 mg/dL (04-05-25 @ 11:25)    Microbiology: reviewed   Culture - Fungal, Other (collected 04-08-25 @ 20:58)  Source: Other Sacral Wound Swab  Preliminary Report (04-09-25 @ 08:09):    Testing in progress    Radiology: reviewed     Medications:  aspirin  chewable 81 milliGRAM(s) Oral daily  atorvastatin 40 milliGRAM(s) Oral at bedtime  bacitracin/polymyxin B Ointment 1 Application(s) Topical two times a day  benzocaine/menthol Lozenge 1 Lozenge Oral three times a day PRN  bisacodyl 5 milliGRAM(s) Oral every 12 hours PRN  chlorhexidine 2% Cloths 1 Application(s) Topical daily  clopidogrel Tablet 75 milliGRAM(s) Oral daily  Dakins Solution - 1/4 Strength 1 Application(s) Topical every 8 hours  epoetin aleah-epbx (RETACRIT) Injectable 19377 Unit(s) IV Push <User Schedule>  heparin   Injectable 5000 Unit(s) SubCutaneous every 8 hours  HYDROmorphone  Injectable 2 milliGRAM(s) IV Push every 6 hours PRN  hydrOXYzine hydrochloride 25 milliGRAM(s) Oral three times a day  ibuprofen  Tablet. 400 milliGRAM(s) Oral every 12 hours  lactobacillus acidophilus 1 Tablet(s) Oral two times a day with meals  mupirocin 2% Ointment 1 Application(s) Topical <User Schedule>  Nephro-margaret 1 Tablet(s) Oral daily  oxyCODONE    IR 10 milliGRAM(s) Oral every 6 hours PRN  oxyCODONE  ER Tablet 30 milliGRAM(s) Oral every 12 hours  pantoprazole    Tablet 40 milliGRAM(s) Oral before breakfast  polyethylene glycol 3350 17 Gram(s) Oral daily  povidone iodine 10% Solution 1 Application(s) Topical two times a day  senna 2 Tablet(s) Oral at bedtime  sodium chloride 0.9% lock flush 10 milliLiter(s) IV Push every 1 hour PRN    Prior/Completed Antimicrobials:  piperacillin/tazobactam IVPB.  piperacillin/tazobactam IVPB.  piperacillin/tazobactam IVPB.-  piperacillin/tazobactam IVPB.-  trimethoprim   80 mG/sulfamethoxazole 400 mG  trimethoprim  160 mG/sulfamethoxazole 800 mG  vancomycin  IVPB  vancomycin  IVPB

## 2025-04-09 NOTE — PROGRESS NOTE ADULT - ASSESSMENT
75 year old male with CKD, sp polio, paraplegia with associated neurogenic bladder s/p suprapubic catheter who was admitted s/p fall on 2/16.   ESRD, s/p DEYA [place] 2/17 ans started on HD  2/20 s/p RRT for tremors and confusion -- pt with chronic tremors although notably worse  SPC site with chronic/stable inflammatory changes, no drainage - no sign of SSTI  CTA abd with occlusion of b/l external iliac arteries and b/l superficial femoral arteries with distal reconstitution at popliteal arteries; patent three vessel runoff of RLE with 2 vessel runoff of LLE with occlusion of L mid anterior tibial artery with distal reconstitution  Worsening PAD with worsening ischemic changes, necrotic toes, s/p RLE angiogram b/l iliac artery stents by Vascular 3/3    3/16 no tenderness at suprapubic catheter site, no visible erythema at catheter site on exam  UA w/ many epithelial cells, Ucx negative  s/p zosyn 3/14-3/16  3/19 SPC site remains without erythema but now noted with some tan crusting on dressing  Treated for possible SPC site infection with bactrim 3/19-3/25  3/23 reporting vomiting x5 episodes, no diarrhea or other focal sx  3/25 CT Chest noted RLL consolidative opacities, LLL opacities - pneumonia vs atelectasis, few ill defined nodular opacities in LLL infectious vs inflammatory  3/25 CTAP with subcutaneous gas and infiltration tracking along medial R buttock, possibly related to decubitus wound with gas tracking from the external environment, possible necrotizing air forming infection; rectal wall thickening suggestive of proctitis; b/l mild hydroureteronephrosis to level of bladder with no obstruction and diffuse urothelial thickening of b/l renal collecting systems and ureters similar to 2/20  MRSA screen has been negative   no resp symptoms- no cough, dyspnea or pain, may have aspirated with vomiting earlier, saturating well on RA  3/26 s/p bedside debridement of L knee wound  as dry eschar  from wound edges without drainage and bedside excisional debridement of unstageable sacrum to left buttock into perineum evolving unstageable pressure injury through necrotic dermis into nonviable subcutaneous tissues, debulking debridement of necrotic tissue done by Wound care; other wounds noted including L calf to ankle wound with liquefaction necrotic drainage; R ischium wound with moist pink granular tissue and L buttock fading DTI  3/30 overnight febrile to 100.9F ?inflammatory -- no further fevers noted, WBC wnl, was on meropenem   Completed meropenem x10d course on 4/3   Sacral wound with eschar with surrounding red granulation tissue, noted no obv acute infection, no sig purulence, no sig surrounding erythema noted, remains afebrile, WBC wnl, nontoxic appearing   4/8 s/p OR for sacral and thigh wound debridement    -- brief op note reviewed -- [id number] sacral wound with necrotic tissue and rind. Debridement was performed with sharp dissection followed by pulse irrigation. L thigh wounds x2 in posterior thigh s/p debridement with sharp dissection, noted with purulence     Recommendations:   Follow OR cultures and path  Surgery following  Started on meropenem 500mg IV Q24h (renally dosed)  Tailor antibiotics pending above   Monitor temps/WBC  Continue local wound care, nutrition, offloading as able  HD per renal    SPC site dressing change and care   Continue rest of care per primary team       Bridgette Ramirez M.D.  [facility]  Available on [employer] TEAMS - *PREFERRED*  [phone]  After 5pm on weekdays and all day on weekends - please call [phone]     Thank you for consulting us and involving us in the management of this patients case. In addition to reviewing history, imaging, documents, labs, microbiology, took into account antibiotic stewardship, local antibiogram and infection control strategies and potential transmission issues at time of treatment decision making process.

## 2025-04-09 NOTE — CONSULT NOTE ADULT - CONSULT REQUESTED BY NAME
Juan
Dr. Martinez
medicine
ER
Medicine
Sudhakar Holguin
primary
Dr Martinez
Floor
ACP
Dr. Martinez
Dr Martinez
Dr. Young
primary team
[facility]
GOC/Pain

## 2025-04-09 NOTE — PROGRESS NOTE ADULT - CONVERSATION DETAILS
Continue conversation that was started on 4/8.     The patient shared his frustration with being bedbound and unable to support his family after a lifetime of independence. He expressed his appreciation for a life well-lived but also his frustration with poorly controlled pain. When asked about his preferences for cardiopulmonary resuscitation, DNR/DNI order was recommended, given the poor outcomes associated with CPR and intubation in patients with serious illnesses, and the additional burden these interventions can impose at the end of life. The patient confirmed having discussed this with his daughter, Cori, an , and expressed his wish for a natural death without chest compressions or intubation. He consented to DNR/DNI status. The MOLST form was completed and placed in the chart. All questions arising during our discussion were addressed, and emotional support was provided to the patient.

## 2025-04-09 NOTE — CONSULT NOTE ADULT - CONSULT REASON
cardiac clearance
L medial femoral condyle fracure
wound
HD access
AMS  mariah
Evaluate Rehabilitation Needs
Uremia/dialysis evaluation
B/l ischemic digits
r/o cellulitis
sacral wound
Nausea
Pain Management
Tunneled cath placement
DVT
elevated R hemidiaphragm
GOC/pain

## 2025-04-09 NOTE — PROGRESS NOTE ADULT - ASSESSMENT
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 He is well known to me from multiple recent admisions  s/p admissions at [facility] 12/20-12/25/24 and 2/4-2/7 with SONDRA on CKD with associated uremic symptoms.   At the last admission he had expressed strong interest to try to hold off with HD intiation but he has been getting worse clinically at home.  His SONDRA and uremic symptoms significantly improved after the first admission; they improved a to mild extent during the 2nd admission to the point where he was able to be discharged without HD. Since then, however, he has worsened further.   He has been suffering from progressively worsening diffuse pruritus, loss of appetite, and generalized weakness and falls.   His nephrologist and PCP has been speaking with him and his family over the past few days,   The initial plan was that he would present to the [facility] ER Monday 2/17 (ie tomorrow) for HD initiation. Today, however, he fell and sustained trauma to his knee. Given the fall and concern for knee fracture, he presented to the ER today. multiple xrays show no Fractures.      CKD5, uremia, requiring initiation of HD  Rash, seborrheic dermatitis  Pruritis  Anemia  PAD  SP catheter, chronic  Left inferior pole acute on chronic patella Fx    plan  - HD via R subclavian permacath  -4/9: s/p debridement in OR by general surgery. pain consult and palliative consult reviewed. pain regimen reviewed w pain team.  they d/w ptn and daughter. ptn refused for the dressing to be changed by surgical team today. they tried several times. wound care post op is very important in healing process. meropenem restarted  -4/8: ptn is awaiting debridement in the OR today, debulking and wound care at the bedside has been challenging 2/2 pain not allowing 2/2 pain and fear of pain  - 4/7: ptn complaining his pain meds are not covering his pain completely. yesterday he was talking about death a lot and how he wished to die. had psych eval today. today he is doing better emotionally, not talking about drath and wants to cont treatment. he is tolerating HD, he finished ABx for PNA last week. his wounds are the primary reason of admission at this point.  I also called palliaitive consult.   He was seen by surgery for debridement of sacral decub, ptn is refusing diverting colostomy. on admission ptn had intact skin but did show primary stages of pressure injury in the sacral region. ptn didnt allow us to turn him 2/2 pain in LLE for several days, despite our concern of further pressure injury on the sacrum. once he was turned we discovered skin break and started wound care. again he would not allow us to turn him. he said the wound is ok, its scabbed, Dee Dee( his wife) is handling it. I called wound care to see him, wound care discovered that the area had necrosis, not scabs, debulking at the bedside was performed, ptn complained it was extremely painful. ptn was seen by wound care attending and recommended debridement in the OR under anesthesia. it is planned for 4/9. it was also recommended he should have a diverting colostomy. ptn is refusing it. HCP his daughter Yanique aware.   -4/6: spoke w ptn's daughter Yanique on the phone today x 20 min, spoke to ptn , his wife and daughter yanique at the bedside x 60 min today. ptn is agreeing to debridement in the OR, adamantly refusing diverting colostomy he would consider it if post debridement he would not improve and would require another debridement. ptn was seen by urology and ID bc daughter complained increased amount of "pus" at the SPT, both ID and urology feel there is no acute infection/cellulitis, the drainage at the site and expected and not unusual. daughter and wife state ptn didnt have sacral wound when he came. on admission with the aide of his wife we flipped him and he had 1st degree pressure decubiti sacrally, skin was intact.   - 4/5: thorough eval by wound care attending 4/4/25, case d/w wound care attending Dr Dugan. as per her recommendation placed general surgery consult for OR debridement and diverting colostomy. ptn had sacral decubiti prior to admission  -4/4: dr dugan from wound care had an extensive conversation w the ptn and daughter yanique on the phone at bedside. ptn needs general surgery consult for OR debridement of necrotic tissue of the sacral decubiti and diverting colostomy. ptn did not tolerate debridement at the bedside. . completed Abx for PNA  -4/3: ptn's insurance changed to straight medicare/medicaid. now able to go to [facility], but ptn wants to go home. will need equipment set up. this was d/w CM and ACP, daughter Yanique on the phone and wife at bedside today. today is last day of ABx. will transition pain meds to po DIlaudid from IV, will also need outptn pain management F/U, outptn wound care, home PT, need outptn HD set up  -4/2: seen prior to going to HD, no new events. tomorrow completing 10 day course of Meropenem. ongoing need for narcotics 2/2 pain.   -4/1: ptn is frustrated about a prolonged hospitalization but states he feels better overall and once medically cleared he will be ready to go home, not sooner than 4/7. completing ABx on 4/3.   -3/31: at HD today had 1.7 L UF, post HD was hypotense, gave gentle IVF , BP still a bit low. afebrile, loose BMs but not diarrhea, GI PCR not sent, RVP neg. . on Meropenem course for total of 10 days, last day 4/3, would care for decubiti and left knee wound post trauma  -3/30: Tmax 100.9, c/o dry cough and diarrhea but not watery. pain is controlled at the time of visit. wife at bedside. will check RVP panel, GI PCR, sine diarrhea non watery will not order C. Diff. ID to follow  - 3/29: cont meropenem, seen by coverage  - 3/28: pain from decubitus ulcer and Left knee post debridement continues. wound care recs in place. on Meropenem for PNA, pulm and ID following. HD as per renal. HDS  -3/27: ptn is complaining that pain post decubital and left knee debridement has been severe. his daughter is on speaker phone, wife is at bedside. his pain is controlled when meds are administered. he is very uncomfortable due to the pain at the site of decubitus debridement. lyrica helped his pain before but made him drowsy, griffin with gabapentin. he doesnt want them to be resumed. will cont dilaudid, oxycodone, oxycontin, ibuprofen. he is on Meropenem for aspiration PNA/HCAP. [facility] for DVT ppx. seen by vascular cardiology to address R IJ post shiley non occlusive DVT. full AC was not recommended. will check rpt doppler in 3-4 weeks.   - 3/26: Wound care performed bedside debridement of Left knee wound 2/2 lifted eschar, and sacral decubitus. findings c/w possible infection and mainly fat necrosis. cont Meropenem. ID following. podiatry following for gangrenous toes. will give additional single dose IV DIlaudid 2/2 severe pain post debridement.    - 3/25: wife at bedside, daughter on speaker phone at bedside. ptn is pain free at the time of visit and states he wants to cont the pain regiment he is presently on. ct C/A/P revealed: RLL PNA, prob aspiration, stercoral proctitis, decubitus ulcer w possible progression to sacral OM, R IJ nonocclusive DVT. these findings d/w ptn ,his family, wound care, ACP, Vascular, Nephrology, ID, pUlm, GI. meropenem initiated, Bactrim stopped. ptn states when he is cleared for DC, he wants to go home , not to [facility], not to SNF. daughter and wife aware.   -3/24: ptn  was seen by GI for N/V, its not clear the etiology, will obtain Ct C/A/P. ptn states he is coughing up green mucus as well  -3/23:  ptn is in good spirits, says he vomited "spit" this am, but tolerated all meals without vomiting. no abd pain, no undigested food in the vomit. afebrile, no diarrhea, RVP neg. GI consulted.   -3/21-22: ptn feels well.  pain is controlled. still no accepting facility for [facility]  -3/20: ptn states he has severe low back and LLE pain though overall is improving.   -3/19: ptn w tan crusting around SPC , states its painful. started on po Bactrim, renally dosed by ID for cellulitis.   -3/18: LLE paraesthesias are chronic, will d/w CM re dc planning to [facility]    -3/17: ptn states he is lethargic, he has LLE numbness which is new and severe pain at SPC insertion site. this was ID, d/w neuro, renal and vascular. as per ID: no sign of an acute infection recommend CT A/P.   -3/16:  urine cx from 3/15 NTD, zosyn DCed, awaiting dc to [facility], not sure will be able to go to [facility] since there is an insurance coverage conflict. HD as per renal  - 3/15: spoke w ptn's daughter today re denial from [place] for [facility]. ptn has [employer] medicare advantage plan, which [facility] doesnt accept. i recommended to speak  to Cm and to the insurance company to find participating facilities. ptn is stable today. pain and erythema at SP catheter site has resolved today. seen by ID, will cont ZOSYN for now. UCx sent.   - 3/14: suprapubic tube changhed by , ptn has crusting at the insertion site and pain. will start Abx, urine always has sediment, will sent for cx, start Vanco/ZOsyn. ID consult called. check MRSA/MSSA PCR  -3/13: ptn is doing well. ptn has an accepting rehab facility, now pending AUTH.   -3/12: ptn is no longer constipated. doing well off prn IV DIlaudid. awaiting dc to [facility]  -3/11: ptn has no new c/o other than feeling constipated, lactulose ordered. also in preparation for [facility] will dc prn IV DIlaudid, cont prn OXy IR  -3/10:  ptn is awake, alert, wife at bedside, i instructed them to give rehab choices. also awaiting SPC change to be done by urology. pain is controlled  -3/9: seen by PT, will refer to [facility]. choices to be given in AM. this was d/w ptn, daughter, wife.   -3/8:  ptn didnt want to get PT eval done, will reorder. ptn needs [facility] placement. PMNR consult ordered  - 3/7: ptn is alert , pain is controlled, DC planning d/w ptn and HCP, daughter Yanique  -3/6:  ptn is awake, alert, ecchymotic feet look improved, pain is controlled. DC planning to [facility]  3/4-5 - s/p b/l iliac arteries angioplasty and stent placements on 3/3. today has new ecchymoses in b/l LE, concern for arterial insufficiency. vascular aware.. wound from Left knee immobilizer is dressed and healing. pain is controlled.   LEFT UE AVF placement/scheduling will be on outptn basis as per vascular. dc planning to [facility]  - 3/3: ptn is s/p angiogram RLE : b/l iliac arteries w successful angioplasty and stents. in PACU. awaitng HD.  ptn has a pressure wound from the LLE immobilizer posteriorly proximal to the knee. its been on 2/2 knee fracture, applied by ortho and managed by ptn's wife as per ptn's and wife's request , this was d/w nursing and nurse manager ms Bobmykel.. immobilizer should be managed by orthopedics and nursing. will keep it off, only keep on when repositioning for care and then remove. wound care to F/U . this was discussed at length w daughter Yanique and wife Dee Dee.   -3/1-3/2: ptn is smiling, pain free, had HD 2/28 and 3/1, awaiting RLE angiogram 3/3, on Heparin drip, euvolemic  -2/28: ptn is lethargic, awaiting RLE angiogram possible fem-fem bypass hopefully on 3/3 pending OR availability, awaiting HD today    -2/27:  plan for angiogram in am with possible angioplasty, possible stent, possible fem-fem bypass. medically cleared for angiogram and possible surgery, stent, angioplasty. pain is controlled. remains on heparin drip as per vascular. HD as per renal    -2/26:  ptn is sleeping, pain is controlled, he was seen by wound care and vascular attending. planning on angiogram 2/2 severe PAD in LE on CTA. he also spoke to HCP. ptn and HCP in agreement. ptn was started on HEPARIN drip as per vascular recs. RIJ permacath done 2/25    - 2/25: ptn states he is hallucinating, but not at present, his MS is at baseline, his pain is controlled, he still needs prn pain meds when he is moved in the bed or for testing or for HD. awaiting Permacath, AVF creation, LE bypass to be d/w Dr. Swenson and the ptn tomorrow. ptn has cardiology clearance  if he opts for. Ptn has sacral decubiti and posterior thigh and buttock decibiti and b/l heel decubiti. Z flow bootie d/w RN, get wound care consult    -2/24: pain is controlled  if the LLE is immobile and fully extended. doesn't tolerate the knee immobilizer. has a medium condyle and acute on chronic patella fx in LEFT knee. as per vascular ptn will need iliac stent  w a fem-fem bypass, possible axillo-femoral bypass. for this surgery he would need cardiac work up . more pressing surgery is LUE AVF creation,  in IR will arrange for Permacath. for pain control: Motrin 400 mg q12H, Oxy ER 30 mg q12H, Oxy IR 10 for mod pain and dilaudid 2 mg iv for severe pain. still has pruritis though improved, will raise atarax 25 mg to tid. plan of care d/w daughter Norma Persaud , ptn and his wife. Also d/w renal, card, vascular, ACP    -2/23: Daughter Yanique is the HCP, she and the ptn filled out the paperwork. daily plan and findings d/w her and the ptn. MS is at abseline, c/o b/l LE foot pain and Left Knee pain. xrays of left knee: cannot r/o acute on chronic inferior pole patellar Fx. knee immobilizer is on, awaiting ortho consult. doubt needs any intervention awaiting Ct chest today, will add CT Left knee. ptn states itching has recurred, severe pain has recurred. will resume Atatrax ( 25 mg bid) and Oxycodone ER , but at a lower dose 15 mg q12H. cont prn analgesics. HD as per renal, awaiting Vascular consult f/u re CTA A/L &LE and recs. ptn also wants AVF surgery on this admission. Coughing has stopped    - 2/22: ptn is drowsy but arousable, calmer today, answers questions appropriately. he is pain free, he stopped coughing, he denies having pruritis: will DC:  OXY ER, Atarax, Tessalon perles.     -  2/21: ptn is tearful, a bit confused, coughing, recognizes me and family at bedside. GOC d/w daughter and wife. AMS prob delirium 2/2 acute illness +/- opiods, lyrica, atarac. will lower atarak to tid, dc lyrica, cont Oxy 2/2 ptn has severe LE pain. neuro called for eval. Head ct done yesterday w no acute findings. CTA A/P w LE run fof: severe PAD, vascular to follow up on plan fo care. plan for HD tomorrow and next week place on tiw schedule of MWF. will need tunneled catheter prior to DC and will d/w vascular scheduling of AVF. ptn wants all to be done while inptn. daughter wants to be HCP as per ptn's wishes. she was given paperwork to get it signed and witnessed and will present to nursing thereafter. details of findings and complaints and plan of care d/w daughter and wife. spent 60 min. RVP ordered. start mucinex , tessalon perles, duonebs, get CT chest to r/o PNA. pulm called    -  2/20:  ptn had RRT 2/2 AMS and tremors. no SZ, no LOC. noted to have Na 123( hyponatremia), given 500 cc NS. Gabapentin DCed. ptn had been taking gabapentin at home PTA. Pain is controlled. Pruritis resolved, tolerating HD otherwise.     - Pain management:  cont Oxycodone 10 IR q6H,  DIlaudid prn severe pain 2 mg q4H prn.   - cont outptn meds  - DVT ppx w HSC

## 2025-04-09 NOTE — PROGRESS NOTE ADULT - SUBJECTIVE AND OBJECTIVE BOX
Surgery Progress Note    S: Patient seen and examined. No acute events overnight. Sleeping comfortably this AM.     O:  Physical Exam:  Gen: Laying in bed, NAD  Resp: Unlabored breathing, on room air    Vital Signs Last 24 Hrs  T(C): 36 (09 Apr 2025 08:00), Max: 36.8 (08 Apr 2025 20:40)  T(F): 96.8 (09 Apr 2025 08:00), Max: 98.3 (08 Apr 2025 20:40)  HR: 81 (09 Apr 2025 08:00) (78 - 89)  BP: 92/50 (09 Apr 2025 08:00) (92/50 - 166/70)  BP(mean): 72 (08 Apr 2025 20:00) (59 - 106)  RR: 18 (09 Apr 2025 08:00) (15 - 18)  SpO2: 94% (09 Apr 2025 08:00) (93% - 100%)    Parameters below as of 09 Apr 2025 08:00  Patient On (Oxygen Delivery Method): room air        I&O's Detail    08 Apr 2025 07:01  -  09 Apr 2025 07:00  --------------------------------------------------------  IN:    Oral Fluid: 240 mL  Total IN: 240 mL    OUT:    Indwelling Catheter - Suprapubic (mL): 400 mL  Total OUT: 400 mL    Total NET: -160 mL                                10.0   7.14  )-----------( 251      ( 08 Apr 2025 07:34 )             32.9       04-09    134[L]  |  96  |  43[H]  ----------------------------<  56[L]  4.5   |  22  |  3.72[H]    Ca    7.2[L]      09 Apr 2025 09:41

## 2025-04-09 NOTE — PROGRESS NOTE ADULT - PROBLEM SELECTOR PLAN 7
-Per wound care  -Pain control  -Plan for OR today for debridement of sacral wound. No pulmonary contraindications to planned procedure.  s/p debridement on 4/8 tolerated procedure well

## 2025-04-09 NOTE — PROGRESS NOTE ADULT - PROBLEM SELECTOR PLAN 3
he was diagnosed with polio at the age of 18 months old,  associated neurogenic bladder/suprapubic catheter and other several complications.

## 2025-04-09 NOTE — PROGRESS NOTE ADULT - ASSESSMENT
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 The initial plan was that he would present to the [facility] ER Monday 2/17 for HD initiation. However, day of admission, he fell and sustained trauma to his knee. Called to consult for cough, elevated R hemidiaphragm.

## 2025-04-09 NOTE — PROGRESS NOTE ADULT - SUBJECTIVE AND OBJECTIVE BOX
SUBJECTIVE AND OBJECTIVE:  Indication for Geriatrics and Palliative Care Services/INTERVAL HPI: goals of care     OVERNIGHT EVENTS: No acute events reported overnight    Patient seen and examined at bedside. Stated that pain is better controlled s/p debridement.   Required oxycodone ER 30 mg BID, oxycodone IR 10 mg x1 and IV Dilaudid 2mg x3 within 24hrs period 8am-8am      DNR on chart: yes   DNI      Allergies    No Known Allergies    Intolerances    MEDICATIONS  (STANDING):  aspirin  chewable 81 milliGRAM(s) Oral daily  atorvastatin 40 milliGRAM(s) Oral at bedtime  bacitracin/polymyxin B Ointment 1 Application(s) Topical two times a day  chlorhexidine 2% Cloths 1 Application(s) Topical daily  clopidogrel Tablet 75 milliGRAM(s) Oral daily  Dakins Solution - 1/4 Strength 1 Application(s) Topical every 8 hours  epoetin aleah-epbx (RETACRIT) Injectable 91005 Unit(s) IV Push <User Schedule>  gabapentin 300 milliGRAM(s) Oral at bedtime  heparin   Injectable 5000 Unit(s) SubCutaneous every 8 hours  hydrOXYzine hydrochloride 25 milliGRAM(s) Oral three times a day  ibuprofen  Tablet. 400 milliGRAM(s) Oral every 12 hours  lactobacillus acidophilus 1 Tablet(s) Oral two times a day with meals  meropenem  IVPB 500 milliGRAM(s) IV Intermittent every 24 hours  methocarbamol 750 milliGRAM(s) Oral every 8 hours  mupirocin 2% Ointment 1 Application(s) Topical <User Schedule>  Nephro-margaret 1 Tablet(s) Oral daily  oxyCODONE  ER Tablet 20 milliGRAM(s) Oral every 8 hours  pantoprazole    Tablet 40 milliGRAM(s) Oral before breakfast  polyethylene glycol 3350 17 Gram(s) Oral daily  povidone iodine 10% Solution 1 Application(s) Topical two times a day  senna 2 Tablet(s) Oral at bedtime    MEDICATIONS  (PRN):  benzocaine/menthol Lozenge 1 Lozenge Oral three times a day PRN Sore Throat  bisacodyl 5 milliGRAM(s) Oral every 12 hours PRN Constipation  HYDROmorphone  Injectable 1 milliGRAM(s) IV Push every 6 hours PRN severe breakthrough pain  oxyCODONE    IR 10 milliGRAM(s) Oral every 4 hours PRN Severe Pain (7 - 10)  sodium chloride 0.9% lock flush 10 milliLiter(s) IV Push every 1 hour PRN Pre/post blood products, medications, blood draw, and to maintain line patency      ITEMS UNCHECKED ARE NOT PRESENT    PRESENT SYMPTOMS: [ ]Unable to self-report - see  CPOT, PAINADS, RDOS below  Source if other than patient:  [ ]Family   [ ]Team     Pain:  [x ]yes [ ]no  QOL impact - not able to rest or perform ALDs   Location -    generalized pain, mostly in the sacral region                 Aggravating factors - none identify   Quality - sore and dull pain   Radiation -  Timing- constant   Severity (0-10 scale): 6 to 7  Minimal acceptable level (0-10 scale):     Dyspnea:                           [ ]Mild [ ]Moderate [ ]Severe  Anxiety:                             [ ]Mild [ ]Moderate [ ]Severe  Fatigue:                             [ ]Mild [ ]Moderate [ ]Severe  Nausea:                             [ ]Mild [ ]Moderate [ ]Severe  Loss of appetite:              [ ]Mild [ ]Moderate [ ]Severe  Constipation:                    [ ]Mild [ ]Moderate [ ]Severe    PCSSQ[Palliative Care Spiritual Screening Question]   Severity (0-10):  Score of 4 or > indicate consideration of Chaplaincy referral.  Chaplaincy Referral: [x] yes [ ] refused [ ] following     Caregiver [place]? : [ ] yes [x ] no  [ ] deferred:  Social work referral [ ] Patient & Family Centered Care Referral [ ]     Anticipatory Grief present?:  [ ] yes [x ] no  [ ] deferred: Social work referral [ ] Patient & Family Centered Care Referral [ ]      Other Symptoms:  [x]All other review of systems negative     PHYSICAL EXAM:  Vital Signs Last 24 Hrs  T(C): 36 (09 Apr 2025 11:00), Max: 36.8 (08 Apr 2025 20:40)  T(F): 96.8 (09 Apr 2025 11:00), Max: 98.3 (08 Apr 2025 20:40)  HR: 80 (09 Apr 2025 11:00) (78 - 89)  BP: 120/68 (09 Apr 2025 11:00) (92/50 - 166/70)  BP(mean): 72 (08 Apr 2025 20:00) (59 - 106)  RR: 18 (09 Apr 2025 11:00) (15 - 18)  SpO2: 95% (09 Apr 2025 11:00) (93% - 100%)    Parameters below as of 09 Apr 2025 11:00  Patient On (Oxygen Delivery Method): room air     I&O's Summary    08 Apr 2025 07:01  -  09 Apr 2025 07:00  --------------------------------------------------------  IN: 240 mL / OUT: 400 mL / NET: -160 mL    09 Apr 2025 07:01  -  09 Apr 2025 12:37  --------------------------------------------------------  IN: 0 mL / OUT: 0 mL / NET: 0 mL       GENERAL:   [ ]Cachexia    [x ]Alert  [x ]Oriented x 4  [ ]Lethargic  [ ]Unarousable  [x ]Verbal  [ ]Non-Verbal  Behavioral:   [ ] Anxiety  [ ] Delirium [ ] Agitation [x ] Other: calm   HEENT:  [ x]Normal   [ ]Dry mouth   [ ]ET Tube/Trach  [ ]Oral lesions  PULMONARY:   [ ]Clear [ ]Tachypnea  [ ]Audible excessive secretions   [ ]Rhonchi        [ ]Right [ ]Left [ ]Bilateral  [ ]Crackles        [ ]Right [ ]Left [ ]Bilateral  [ ]Wheezing     [ ]Right [ ]Left [ ]Bilateral  [x ]Diminished breath sounds [ ]right [ ]left [ x]bilateral  CARDIOVASCULAR:    [ x]Regular [ ]Irregular [ ]Tachy  [ ]Victor M [ ]Murmur [ ]Other  GASTROINTESTINAL:  [ ]Soft  [ x]Distended   [ ]+BS  [x ]Non tender [ ]Tender  [ ]Other [ ]PEG [ ]OGT/ NGT  Last BM: 4/6  GENITOURINARY:  [ ]Normal [ ] Incontinent   [ ]Oliguria/Anuria   [ ]Murillo [x] suprapubic cath   MUSCULOSKELETAL:   [ ]Normal   [ ]Weakness  [x ]Bed/Wheelchair bound [ ]Edema  NEUROLOGIC:   [x ]No focal deficits  [ ]Cognitive impairment  [ ]Dysphagia [ ]Dysarthria [ ]Paresis [ ]Other   SKIN: Please see RN documentation which I have reviewed.  Sacral, Rt Ischial, and Left Buttock wounds  BLE severe PAD w/ extensive wounds   [ ]Normal  [ ]Rash  [ ]Other  [x ]Pressure ulcer(s)       Present on admission [x ]y [ ]n    CRITICAL CARE:  [ ] Shock Present  [ ]Septic [ ]Cardiogenic [ ]Neurologic [ ]Hypovolemic  [ ]  Vasopressors [ ]  Inotropes   [ ]Respiratory failure present [ ]Mechanical ventilation [ ]Non-invasive ventilatory support [ ]High flow    [ ]Acute  [ ]Chronic [ ]Hypoxic  [ ]Hypercarbic [ ]Other  [ x]Other organ failure: skin       LABS:                        10.0   7.14  )-----------( 251      ( 08 Apr 2025 07:34 )             32.9   04-09    134[L]  |  96  |  43[H]  ----------------------------<  56[L]  4.5   |  22  |  3.72[H]    Ca    7.2[L]      09 Apr 2025 09:41    PT/INR - ( 08 Apr 2025 07:34 )   PT: 15.1 sec;   INR: 1.32 ratio         PTT - ( 08 Apr 2025 07:34 )  PTT:36.0 sec    Urinalysis Basic - ( 09 Apr 2025 09:41 )    Color: x / Appearance: x / SG: x / pH: x  Gluc: 56 mg/dL / Ketone: x  / Bili: x / Urobili: x   Blood: x / Protein: x / Nitrite: x   Leuk Esterase: x / RBC: x / WBC x   Sq Epi: x / Non Sq Epi: x / Bacteria: x      RADIOLOGY & ADDITIONAL STUDIES: reviewed none new    Protein Calorie Malnutrition Present: [ ]mild [ ]moderate [ ]severe [ ]underweight [ ]morbid obesity  https://www.andeal.org/vault/2440/web/files/ONC/Table_Clinical%20Characteristics%20to%20Document%20Malnutrition-White%20JV%20et%20al%202012.pdf    Height (cm): 172.7 (04-08-25 @ 15:25), 172.7 (02-04-25 @ 16:31), 172.7 (12-20-24 @ 12:36)  Weight (kg): 90.7 (04-08-25 @ 15:25), 90.7 (02-04-25 @ 16:31), 94.3 (12-20-24 @ 12:36)  BMI (kg/m2): 30.4 (04-08-25 @ 15:25), 30.4 (02-04-25 @ 16:31), 31.6 (12-20-24 @ 12:36)    [ ]PPSV2 < or = 30%  [ ]significant weight loss [ ]poor nutritional intake [ ]anasarca[ ]Artificial Nutrition    Other REFERRALS:  [ ]Hospice  [ ]Child Life  [ ]Social Work  [x]Case management [ ]Holistic Therapy                                        Progress Notes    PROGRESS NOTE  Date & Time of Note   2025-04-08 11:20    Notes    Notes: Chart reviewed and noted. Per medical IDR rounds, pt remains acute on HD  q Mon, Wed and Fri via right chest wall catheter. Pt continues with multiple  wounds and a suprapubic catheter in place. Pt is scheduled for OR debridement  of sacral ulcer today 4.8. Pt is pending possible palliative GOC conversation.     Anticipated discharge needs remains [facility] with HD vs home P/T. Pt has been  accepted to [facility] with HD when medically cleared. If pt chooses home, pt has  also been accepted at [facility] for outpatient hemodialysis. Pt  would require transportation arranged for first session. Case management will  continue to collaborate with interdisciplinary team to assess needs and remains  available.       Electronically signed by:  Kalyani Marrufo  Electronically signed on:  2025-04-08  11:23  Palliative Performance Scale:  http://npcrc.org/files/news/palliative_performance_scale_ppsv2.pdf  (Ctrl +  left click to view)  Respiratory Distress Observation Tool:  https://homecareinformation.net/handouts/hen/Respiratory_Distress_Observation_Scale.pdf (Ctrl +  left click to view)  PAINAD Score:  http://geriatrictoolkit.[place].[facility]/cog/painad.pdf (Ctrl +  left click to view)

## 2025-04-09 NOTE — PROGRESS NOTE ADULT - ASSESSMENT
74 yo M w/ PMHx of multiple comorbidities including polio, chronic pain with multiple sequela including LE weakness requiring wheelchair attributed to polio since 18 months of age, scoliosis, neurogenic bladder s/p suprapubic catheter, and colostomy s/p iatrogenic rectal rupture injury, reversal of colostomy, anemia, erectile dysfunction, multiple fractures including patella and femoral condyle with gianluca placement of the lower extremities, deep venous thrombosis, elevated hemidiaphragm, hyperlipidemia, bacterial sepsis, electrolyte imbalance, shoulder surgery, cataract surgery, tremors, PAD who initially presented to ED after fall from wheelchair found to have ESRD now on HD w progressive wounds. Surgery consulted for evaluation of sacral wound. S/p sacral ulcer and thigh wounds debridement on 4/8.    PLAN:  - Will do dressing change today  - Continue frequent offloading  - Continue to optimize pain control  - Remainder of care per primary    Trauma -[id number]

## 2025-04-09 NOTE — PROGRESS NOTE ADULT - SUBJECTIVE AND OBJECTIVE BOX
INTERVAL HPI/OVERNIGHT EVENTS:    s/p sacral and thigh wound debridement yesterday   resting comfortable   offer no gi complaints, concerned about having bm's      MEDICATIONS  (STANDING):  aspirin  chewable 81 milliGRAM(s) Oral daily  atorvastatin 40 milliGRAM(s) Oral at bedtime  bacitracin/polymyxin B Ointment 1 Application(s) Topical two times a day  chlorhexidine 2% Cloths 1 Application(s) Topical daily  clopidogrel Tablet 75 milliGRAM(s) Oral daily  Dakins Solution - 1/4 Strength 1 Application(s) Topical every 8 hours  epoetin aleah-epbx (RETACRIT) Injectable 29179 Unit(s) IV Push <User Schedule>  heparin   Injectable 5000 Unit(s) SubCutaneous every 8 hours  hydrOXYzine hydrochloride 25 milliGRAM(s) Oral three times a day  ibuprofen  Tablet. 400 milliGRAM(s) Oral every 12 hours  lactobacillus acidophilus 1 Tablet(s) Oral two times a day with meals  mupirocin 2% Ointment 1 Application(s) Topical <User Schedule>  Nephro-margaret 1 Tablet(s) Oral daily  oxyCODONE  ER Tablet 30 milliGRAM(s) Oral every 12 hours  pantoprazole    Tablet 40 milliGRAM(s) Oral before breakfast  polyethylene glycol 3350 17 Gram(s) Oral daily  povidone iodine 10% Solution 1 Application(s) Topical two times a day  senna 2 Tablet(s) Oral at bedtime    MEDICATIONS  (PRN):  benzocaine/menthol Lozenge 1 Lozenge Oral three times a day PRN Sore Throat  bisacodyl 5 milliGRAM(s) Oral every 12 hours PRN Constipation  HYDROmorphone  Injectable 2 milliGRAM(s) IV Push every 6 hours PRN Severe Pain (7 - 10)  oxyCODONE    IR 10 milliGRAM(s) Oral every 6 hours PRN Moderate Pain (4 - 6)  sodium chloride 0.9% lock flush 10 milliLiter(s) IV Push every 1 hour PRN Pre/post blood products, medications, blood draw, and to maintain line patency      Allergies    No Known Allergies    Intolerances        Review of Systems:    General:  No wt loss, fevers, chills, night sweats, fatigue   Eyes:  Good vision, no reported pain  ENT:  No sore throat, pain, runny nose, dysphagia  CV:  No pain, palpitations, hypo/hypertension  Resp:  No dyspnea, cough, tachypnea, wheezing  GI:  No pain, No nausea, No vomiting, No diarrhea, No constipation, No weight loss, No fever, No pruritis, No rectal bleeding, No melena, No dysphagia  :  No pain, bleeding, incontinence, nocturia  Muscle:  No pain, weakness  Neuro:  No weakness, tingling, memory problems  Psych:  No fatigue, insomnia, mood problems, depression  Endocrine:  No polyuria, polydypsia, cold/heat intolerance  Heme:  No petechiae, ecchymosis, easy bruisability  Skin:  No rash, tattoos, scars, edema      Vital Signs Last 24 Hrs  T(C): 36 (09 Apr 2025 08:00), Max: 36.8 (08 Apr 2025 20:40)  T(F): 96.8 (09 Apr 2025 08:00), Max: 98.3 (08 Apr 2025 20:40)  HR: 81 (09 Apr 2025 08:00) (78 - 89)  BP: 92/50 (09 Apr 2025 08:00) (92/50 - 166/70)  BP(mean): 72 (08 Apr 2025 20:00) (59 - 106)  RR: 18 (09 Apr 2025 08:00) (15 - 18)  SpO2: 94% (09 Apr 2025 08:00) (93% - 100%)    Parameters below as of 09 Apr 2025 08:00  Patient On (Oxygen Delivery Method): room air        PHYSICAL EXAM:    Constitutional: NAD  HEENT: EOMI, throat clear  Neck: No LAD, supple  Respiratory: CTA and P  Cardiovascular: S1 and S2, RRR, no M  Gastrointestinal: BS+, soft, NT/ND, neg HSM,  Extremities: No peripheral edema, neg clubbing, cyanosis  Vascular: 2+ peripheral pulses  Neurological: A/O   Psychiatric: Normal mood, normal affect  Skin: No rashes      LABS:                        10.0   7.14  )-----------( 251      ( 08 Apr 2025 07:34 )             32.9     04-09    134[L]  |  96  |  43[H]  ----------------------------<  56[L]  4.5   |  22  |  3.72[H]    Ca    7.2[L]      09 Apr 2025 09:41      PT/INR - ( 08 Apr 2025 07:34 )   PT: 15.1 sec;   INR: 1.32 ratio         PTT - ( 08 Apr 2025 07:34 )  PTT:36.0 sec  Urinalysis Basic - ( 09 Apr 2025 09:41 )    Color: x / Appearance: x / SG: x / pH: x  Gluc: 56 mg/dL / Ketone: x  / Bili: x / Urobili: x   Blood: x / Protein: x / Nitrite: x   Leuk Esterase: x / RBC: x / WBC x   Sq Epi: x / Non Sq Epi: x / Bacteria: x        RADIOLOGY & ADDITIONAL TESTS:

## 2025-04-09 NOTE — PROGRESS NOTE ADULT - SUBJECTIVE AND OBJECTIVE BOX
Patient is a 75y old  Male who presents with a chief complaint of ESRD requiring HD, uremia (09 Apr 2025 15:29)      SUBJECTIVE / OVERNIGHT EVENTS: s/p debridement in OR by general surgery. pain consult and palliative consult reviewed. pain regimen reviewed w pain team.  they d/w ptn and daughter. ptn refused for the dressing to be changed by surgical team today. they tried several times. wound care post op is very important in healing process.     MEDICATIONS  (STANDING):  aspirin  chewable 81 milliGRAM(s) Oral daily  atorvastatin 40 milliGRAM(s) Oral at bedtime  bacitracin/polymyxin B Ointment 1 Application(s) Topical two times a day  chlorhexidine 2% Cloths 1 Application(s) Topical daily  clopidogrel Tablet 75 milliGRAM(s) Oral daily  Dakins Solution - 1/4 Strength 1 Application(s) Topical every 8 hours  epoetin aleah-epbx (RETACRIT) Injectable 23050 Unit(s) IV Push <User Schedule>  gabapentin 300 milliGRAM(s) Oral at bedtime  heparin   Injectable 5000 Unit(s) SubCutaneous every 8 hours  hydrOXYzine hydrochloride 25 milliGRAM(s) Oral three times a day  ibuprofen  Tablet. 400 milliGRAM(s) Oral every 12 hours  lactobacillus acidophilus 1 Tablet(s) Oral two times a day with meals  meropenem  IVPB 500 milliGRAM(s) IV Intermittent every 24 hours  methocarbamol 750 milliGRAM(s) Oral every 8 hours  mupirocin 2% Ointment 1 Application(s) Topical <User Schedule>  Nephro-margaret 1 Tablet(s) Oral daily  oxyCODONE  ER Tablet 20 milliGRAM(s) Oral every 8 hours  pantoprazole    Tablet 40 milliGRAM(s) Oral before breakfast  polyethylene glycol 3350 17 Gram(s) Oral daily  povidone iodine 10% Solution 1 Application(s) Topical two times a day  senna 2 Tablet(s) Oral at bedtime    MEDICATIONS  (PRN):  benzocaine/menthol Lozenge 1 Lozenge Oral three times a day PRN Sore Throat  bisacodyl 5 milliGRAM(s) Oral every 12 hours PRN Constipation  HYDROmorphone  Injectable 1 milliGRAM(s) IV Push every 6 hours PRN severe breakthrough pain  oxyCODONE    IR 10 milliGRAM(s) Oral every 4 hours PRN Severe Pain (7 - 10)  sodium chloride 0.9% lock flush 10 milliLiter(s) IV Push every 1 hour PRN Pre/post blood products, medications, blood draw, and to maintain line patency      Vital Signs Last 24 Hrs  T(F): 96.8 (04-09-25 @ 11:00), Max: 98.3 (04-08-25 @ 20:40)  HR: 80 (04-09-25 @ 11:00) (78 - 89)  BP: 120/68 (04-09-25 @ 11:00) (92/50 - 166/70)  RR: 18 (04-09-25 @ 11:00) (15 - 18)  SpO2: 95% (04-09-25 @ 11:00) (93% - 100%)  Telemetry:   CAPILLARY BLOOD GLUCOSE        I&O's Summary    08 Apr 2025 07:01  -  09 Apr 2025 07:00  --------------------------------------------------------  IN: 240 mL / OUT: 400 mL / NET: -160 mL    09 Apr 2025 07:01  -  09 Apr 2025 17:08  --------------------------------------------------------  IN: 0 mL / OUT: 0 mL / NET: 0 mL        PHYSICAL EXAM:  GENERAL: NAD, well-developed  HEAD:  Atraumatic, Normocephalic  EYES: EOMI, PERRLA, conjunctiva and sclera clear  NECK: Supple, No JVD  CHEST/LUNG: Clear to auscultation bilaterally; No wheeze  HEART: Regular rate and rhythm; No murmurs, rubs, or gallops  ABDOMEN: Soft, Nontender, Nondistended; Bowel sounds present  EXTREMITIES:  2+ Peripheral Pulses, No clubbing, cyanosis, or edema  PSYCH: AAOx3  NEUROLOGY: non-focal  SKIN: No rashes or lesions    LABS:                        10.0   7.14  )-----------( 251      ( 08 Apr 2025 07:34 )             32.9     04-09    134[L]  |  96  |  43[H]  ----------------------------<  56[L]  4.5   |  22  |  3.72[H]    Ca    7.2[L]      09 Apr 2025 09:41      PT/INR - ( 08 Apr 2025 07:34 )   PT: 15.1 sec;   INR: 1.32 ratio         PTT - ( 08 Apr 2025 07:34 )  PTT:36.0 sec      Urinalysis Basic - ( 09 Apr 2025 09:41 )    Color: x / Appearance: x / SG: x / pH: x  Gluc: 56 mg/dL / Ketone: x  / Bili: x / Urobili: x   Blood: x / Protein: x / Nitrite: x   Leuk Esterase: x / RBC: x / WBC x   Sq Epi: x / Non Sq Epi: x / Bacteria: x        RADIOLOGY & ADDITIONAL TESTS:    Imaging Personally Reviewed:    Consultant(s) Notes Reviewed:      Care Discussed with Consultants/Other Providers:

## 2025-04-09 NOTE — PROGRESS NOTE ADULT - PROBLEM SELECTOR PLAN 1
CT chest with new RLL consolidation and LLL opacities, tracheal debris  -Suspect aspiration related s/p episode of vomiting  -S/p ABX as per ID  -Continue bronchodilators  -Keep sats >90% with o2 PRN  -Pt with low grade temp 3/30, dry cough. RVP negative, now afebrile. Continue to monitor fever curve  -Aspiration precautions.  -s/p debridement;  o n 4/8: tolerated well :  no sob:  no recent cxr  : repeat one

## 2025-04-09 NOTE — PROGRESS NOTE ADULT - PROBLEM SELECTOR PLAN 4
HCP: daughter Cori, pending to bring form    The patient shared his frustration with being bedbound and unable to support his family after a lifetime of independence. He expressed his appreciation for a life well-lived but also his frustration with poorly controlled pain. When asked about his preferences for cardiopulmonary resuscitation, patient confirmed having discussed this with his daughter, Cori, an , and expressed his wish for a natural death without chest compressions or intubation. He consented to DNR/DNI status. MOLST form was completed and placed in the chart.     Please see above GOC note

## 2025-04-09 NOTE — PROGRESS NOTE ADULT - PROBLEM SELECTOR PLAN 5
Palliative care team following for goals of care   Case reviewed with primary team  Ruiz ZACARIAS consulted.    Gerda Gunter MD   Geriatrics and Palliative Care Attending   [facility]     In the event of newly developing, evolving, or worsening symptoms, please contact the Palliative Medicine team via pager (if the patient is at [facility] #[phone] or if the patient is at [facility] #[phone]) The Geriatric and Palliative Medicine service has coverage 24 hours a day/ 7 days a week to provide medical recommendations regarding symptom management needs via telephone.

## 2025-04-09 NOTE — CONSULT NOTE ADULT - REASON FOR ADMISSION
ESRD requiring HD, uremia

## 2025-04-09 NOTE — PROGRESS NOTE ADULT - ASSESSMENT
74 year old man with SONDRA on CKD requiring HD, now with nausea    1. ESRD on HD (new)     2. Proctitis d/t constipation which has since resolved   -maintain on senna qhs with Miralax twice daily     3. Decubitus Ulcer   f/u Sx, ID and wound care teams   possible diverting ostomy  s/p sacral and thigh debridement 4/8; f/u Sx team

## 2025-04-10 NOTE — PROGRESS NOTE ADULT - ASSESSMENT
75 year old male with CKD, sp polio, paraplegia with associated neurogenic bladder s/p suprapubic catheter who was admitted s/p fall on 2/16.   ESRD, s/p DEYA [place] 2/17 ans started on HD  2/20 s/p RRT for tremors and confusion -- pt with chronic tremors although notably worse  SPC site with chronic/stable inflammatory changes, no drainage - no sign of SSTI  CTA abd with occlusion of b/l external iliac arteries and b/l superficial femoral arteries with distal reconstitution at popliteal arteries; patent three vessel runoff of RLE with 2 vessel runoff of LLE with occlusion of L mid anterior tibial artery with distal reconstitution  Worsening PAD with worsening ischemic changes, necrotic toes, s/p RLE angiogram b/l iliac artery stents by Vascular 3/3    3/16 no tenderness at suprapubic catheter site, no visible erythema at catheter site on exam  UA w/ many epithelial cells, Ucx negative  s/p zosyn 3/14-3/16  3/19 SPC site remains without erythema but now noted with some tan crusting on dressing  Treated for possible SPC site infection with bactrim 3/19-3/25  3/23 reporting vomiting x5 episodes, no diarrhea or other focal sx  3/25 CT Chest noted RLL consolidative opacities, LLL opacities - pneumonia vs atelectasis, few ill defined nodular opacities in LLL infectious vs inflammatory  3/25 CTAP with subcutaneous gas and infiltration tracking along medial R buttock, possibly related to decubitus wound with gas tracking from the external environment, possible necrotizing air forming infection; rectal wall thickening suggestive of proctitis; b/l mild hydroureteronephrosis to level of bladder with no obstruction and diffuse urothelial thickening of b/l renal collecting systems and ureters similar to 2/20  MRSA screen has been negative   no resp symptoms- no cough, dyspnea or pain, may have aspirated with vomiting earlier, saturating well on RA  3/26 s/p bedside debridement of L knee wound  as dry eschar  from wound edges without drainage and bedside excisional debridement of unstageable sacrum to left buttock into perineum evolving unstageable pressure injury through necrotic dermis into nonviable subcutaneous tissues, debulking debridement of necrotic tissue done by Wound care; other wounds noted including L calf to ankle wound with liquefaction necrotic drainage; R ischium wound with moist pink granular tissue and L buttock fading DTI  3/30 overnight febrile to 100.9F ?inflammatory -- no further fevers noted, WBC wnl, was on meropenem   Completed meropenem x10d course on 4/3   Sacral wound with eschar with surrounding red granulation tissue, noted no obv acute infection, no sig purulence, no sig surrounding erythema noted, remains afebrile, WBC wnl, nontoxic appearing   4/8 s/p OR for sacral and thigh wound debridement    -- brief op note reviewed -- [id number] sacral wound with necrotic tissue and rind. Debridement was performed with sharp dissection followed by pulse irrigation. L thigh wounds x2 in posterior thigh s/p debridement with sharp dissection, noted with purulence     Recommendations:   Follow OR culture for Pseudomonas sensitivities  Follow path report   Surgery following  Continue meropenem 500mg IV Q24h (renally dosed)  Tailor antibiotics pending above   Monitor temps/WBC  Continue local wound care, nutrition, offloading as able  HD per renal    SPC site dressing change and care   Continue rest of care per primary team       Bridgette Ramirez M.D.  [facility]  Available on [employer] TEAMS - *PREFERRED*  [phone]  After 5pm on weekdays and all day on weekends - please call [phone]     Thank you for consulting us and involving us in the management of this patients case. In addition to reviewing history, imaging, documents, labs, microbiology, took into account antibiotic stewardship, local antibiogram and infection control strategies and potential transmission issues at time of treatment decision making process.

## 2025-04-10 NOTE — PROGRESS NOTE ADULT - PROBLEM SELECTOR PLAN 4
HCP: daughter Cori, pending to bring form  Code status: DNR/I    The patient and family expressed concern regarding the hypotension and stated their desire to pursue all available life prolonging treatments, including dialysis and pressors.

## 2025-04-10 NOTE — PROGRESS NOTE ADULT - ASSESSMENT
74M with history of HTN, polio with multiple subsequent problems including LE weakness requiring wheelchair, neurogenic bladder s/p suprapubic catheter, and colostomy s/p iatrogenic rectal injury 2/2023, and CKD 5 presenting to the ED with weakness and falls. Admitted for initiation of HD now s/p R IJ shiley placement on 2/17/25 and conversion to tunneled catheter on 2/25. Vascular surgery initially consulted for AVF creation; however, patient's worsening PAD with worsening ischemic changes is the more urgent issue now s/p b/l iliac stent placement on 3/3/25.     Wound Consult requested to assist w/ management of:  Sacral, Rt Ischial, and Left Buttock wounds  BLE severe PAD w/ extensive wounds     Sacrum/ Lt thigh - continue to pack w/ 1/4 Dakin's moistened packing, can add collagenase  Buttocks continue w/ TRIAD BID and prn soiling        Continue w/ attends under pads and Pericare as per protocol  Lt knee/ calf wounds- Medihoney dressing QD  Feet /ankle skin changes- as per podiatry  Appreciate Vascular input- continue to follow up  Ortho Follow up for Knee fx  Feet f/u as per Podiatry/ Vascular  BLE elevation & Compression  Abx per Medicine/ ID  Consider MRI vs CT to r/o OM of sacrum  Consider Palliative for GOC and pain mngt  Moisturize intact skin w/ SWEEN cream BID  Nutrition Consult for optimization in pt w/ Moderate Protein Calorie Malnutrition,        encourage high quality protein, sandy/ prosource, MVI & Vit C to promote wound healing  Continue turning and positioning w/ offloading assistive devices as per protocol  Waffle Cushion to chair when oob to chair  Continue w/ low air loss pressure redistribution bed surface   Pt will need Group 2 mattress on hospital al bed and ROHO cushion for wheel chair upon discharge home  Care as per medicine, will follow w/ you  Upon discharge f/u as outpatient at [facility] [address] [facility] [phone]  s/w Surgery Attng/ team & RN and D/w team    Thank you for this consult  Danica Humphreys PA-C [id number]  Nights/ Weekends/ Holidays please call:  General Surgery Consult pager [phone]) for emergencies  Wound PT for multilayer leg wrapping or VAC issues (x [phone])   I spent 50minutes face to face w/ this pt of which more than 50% of the time was spent counseling & coordinating care of this pt.

## 2025-04-10 NOTE — PROGRESS NOTE ADULT - SUBJECTIVE AND OBJECTIVE BOX
NEPHROLOGY     Patient seen and examined resting on room air, reports of pain, denies sob, he had HD yesterday with no fluid removed due to hypotension, currently in no acute distress.     MEDICATIONS  (STANDING):  aspirin  chewable 81 milliGRAM(s) Oral daily  atorvastatin 40 milliGRAM(s) Oral at bedtime  bacitracin/polymyxin B Ointment 1 Application(s) Topical two times a day  chlorhexidine 2% Cloths 1 Application(s) Topical daily  clopidogrel Tablet 75 milliGRAM(s) Oral daily  Dakins Solution - 1/4 Strength 1 Application(s) Topical every 8 hours  epoetin aleah-epbx (RETACRIT) Injectable 86449 Unit(s) IV Push <User Schedule>  gabapentin 300 milliGRAM(s) Oral at bedtime  heparin   Injectable 5000 Unit(s) SubCutaneous every 8 hours  hydrOXYzine hydrochloride 25 milliGRAM(s) Oral three times a day  ibuprofen  Tablet. 400 milliGRAM(s) Oral every 12 hours  lactobacillus acidophilus 1 Tablet(s) Oral two times a day with meals  meropenem  IVPB 500 milliGRAM(s) IV Intermittent every 24 hours  methocarbamol 750 milliGRAM(s) Oral every 8 hours  midodrine. 10 milliGRAM(s) Oral <User Schedule>  mupirocin 2% Ointment 1 Application(s) Topical <User Schedule>  Nephro-margaret 1 Tablet(s) Oral daily  oxyCODONE  ER Tablet 20 milliGRAM(s) Oral every 8 hours  pantoprazole    Tablet 40 milliGRAM(s) Oral before breakfast  polyethylene glycol 3350 17 Gram(s) Oral daily  povidone iodine 10% Solution 1 Application(s) Topical two times a day  senna 2 Tablet(s) Oral at bedtime    VITALS:  T(C): , Max: 36.7 (04-10-25 @ 00:27)  T(F): , Max: 98.1 (04-10-25 @ 00:27)  HR: 77 (04-10-25 @ 12:47)  BP: 103/46 (04-10-25 @ 12:47)  RR: 18 (04-10-25 @ 12:47)  SpO2: 95% (04-10-25 @ 12:47)    I and O's:    04-09 @ 07:01  -  04-10 @ 07:00  --------------------------------------------------------  IN: 0 mL / OUT: 200 mL / NET: -200 mL      PHYSICAL EXAM:  Constitutional: lethargic but alert, NAD  HEENT: NCAT, DMM  Neck: Supple, No JVD  Respiratory: CTA-b/l  Cardiovascular: RRR s1s2, no m/r/g  Gastrointestinal: BS+, soft, NT/ND  : (+)suprapubic cath  Extremities: 1+ b/l LE edema  Neurological: reduced generalized strength  Back: no CVAT b/l  Skin: gangrenous changes b/l feet  Access: RIJ tunneled cath-accessed    LABS:                        9.7    8.84  )-----------( 278      ( 10 Apr 2025 09:30 )             32.8     04-10    136  |  99  |  24[H]  ----------------------------<  64[L]  3.9   |  25  |  2.92[H]    Ca    7.2[L]      10 Apr 2025 09:30    RADIOLOGY & ADDITIONAL STUDIES:    rad< from: Xray Chest 1 View- PORTABLE-Urgent (Xray Chest 1 View- PORTABLE-Urgent .) (04.09.25 @ 17:11) >    IMPRESSION:  Clear lungs.    --- End of Report ---      KENN CARDENAS MD; Resident Radiologist  This document has been electronically signed.  CLARENCE MATHEWS MD; Attending Radiologist  This document hasbeen electronically signed. Apr 10 2025 11:53AM    < end of copied text >

## 2025-04-10 NOTE — PROGRESS NOTE ADULT - SUBJECTIVE AND OBJECTIVE BOX
[facility]-- WOUND TEAM -- FOLLOW UP NOTE  --------------------------------------------------------------------------------    24 hour events/subjective:    alert  afebrile  tolerating po w/o n/v     good appetite  incontinent  no c/o odor or excess drainage  pt s/p OR Debridement of Sacral and Lt gluteal crease/ thigh wounds by Gen Surg  pt has been refusing dressing changes  this am pt hypoTN - pt now HD stabilized and agreeable to dressing change w/ Gen Surg  pt pre- medicated w/ pain Rx       pain/discomfort minimized   wife at bedside- all questions answered to their expressed satisfaction  wife taking pictures and on speaker phone with family  reminded/ reeducated re importance of offloading, pericare/ hygiene, and dressing changes      Diet:  Diet, Regular:   1000mL Fluid Restriction ([id number])  Low Sodium  Liquid Protein Supplement     Qty per Day:  2  Supplement Feeding Modality:  Oral  Nepro Cans or Servings Per Day:  2       Frequency:  Daily (04-08-25 @ 17:54)      ROS: General/ SKIN/ MSK/ Neuro/GI see HPI  all other systems negative      ALLERGIES & MEDICATIONS  --------------------------------------------------------------------------------    No Known Allergies      STANDING INPATIENT MEDICATIONS  aspirin  chewable 81 milliGRAM(s) Oral daily  atorvastatin 40 milliGRAM(s) Oral at bedtime  bacitracin/polymyxin B Ointment 1 Application(s) Topical two times a day  chlorhexidine 2% Cloths 1 Application(s) Topical daily  clopidogrel Tablet 75 milliGRAM(s) Oral daily  Dakins Solution - 1/4 Strength 1 Application(s) Topical every 8 hours  epoetin aleah-epbx (RETACRIT) Injectable 45883 Unit(s) IV Push <User Schedule>  gabapentin 300 milliGRAM(s) Oral at bedtime  heparin   Injectable 5000 Unit(s) SubCutaneous every 8 hours  hydrOXYzine hydrochloride 25 milliGRAM(s) Oral three times a day  ibuprofen  Tablet. 400 milliGRAM(s) Oral every 12 hours  lactobacillus acidophilus 1 Tablet(s) Oral two times a day with meals  meropenem  IVPB 500 milliGRAM(s) IV Intermittent every 24 hours  methocarbamol 750 milliGRAM(s) Oral every 8 hours  midodrine. 10 milliGRAM(s) Oral <User Schedule>  mupirocin 2% Ointment 1 Application(s) Topical <User Schedule>  Nephro-margaret 1 Tablet(s) Oral daily  oxyCODONE  ER Tablet 20 milliGRAM(s) Oral every 8 hours  pantoprazole    Tablet 40 milliGRAM(s) Oral before breakfast  polyethylene glycol 3350 17 Gram(s) Oral daily  povidone iodine 10% Solution 1 Application(s) Topical two times a day  senna 2 Tablet(s) Oral at bedtime      PRN INPATIENT MEDICATION  benzocaine/menthol Lozenge 1 Lozenge Oral three times a day PRN  bisacodyl 5 milliGRAM(s) Oral every 12 hours PRN  HYDROmorphone  Injectable 1 milliGRAM(s) IV Push every 6 hours PRN  oxyCODONE    IR 10 milliGRAM(s) Oral every 4 hours PRN  sodium chloride 0.9% lock flush 10 milliLiter(s) IV Push every 1 hour PRN        VITALS/PHYSICAL EXAM  --------------------------------------------------------------------------------  T(C): 36.5 (04-10-25 @ 10:58), Max: 36.7 (04-10-25 @ 00:27)  HR: 77 (04-10-25 @ 12:47) (77 - 85)  BP: 103/46 (04-10-25 @ 12:47) (87/50 - 114/70)  RR: 18 (04-10-25 @ 12:47) (18 - 18)  SpO2: 95% (04-10-25 @ 12:47) (94% - 95%)  Wt(kg): --        04-09-25 @ 07:01  -  04-10-25 @ 07:00  --------------------------------------------------------  IN: 0 mL / OUT: 200 mL / NET: -200 mL    04-10-25 @ 07:01  -  04-10-25 @ 17:16  --------------------------------------------------------  IN: 240 mL / OUT: 0 mL / NET: 240 mL    NAD,  A&Ox3, Obese, WD/ WN/ WG  Total Care SPort bed  HEENT:  NC/AT, EOMI, sclera clear, mucosa moist, throat clear, trachea midline, neck supple  Respiratory: nonlabored w/ equal chest rise  Gastrointestinal: soft NT/ND   : (+) superpubic cath     Scrotum skin resolved hyperpigmentation      no blistering or drainage  No odor, erythema, increased warmth, tenderness, induration, fluctuance, nor crepitus  Neurology:  weakened strength & sensation grossly intact  Psych: calm/ appropriate  Musculoskeletal: FROM, no deformities/ contractures  Vascular: BLE equally warm/cool,  no clubbing      (+)BLE edema equal       no BLE DP/PT pulses palpable      BLE demarcation noted across toes and heels w/ dry black eschar/ ganrene       Bilateral feet/ heel dressings c/d/i as per Podiatry/ Vascular      no blistering or drainage  No odor, erythema, increased warmth, tenderness, induration, fluctuance, nor crepitus   Skin: thin, dry, pale, frail,  ecchymosis w/o hematoma  Sacrum into Lt buttocks into perineum wound   soft grey / yellow/ tan slough w/ small area of pink moist granular tissue            and fading purple and pink denuded skin changes peripherally           10cm x 13cm x 2cm      w/serosanguinous drainage w/ green tinge on dressing  Rt Ischium stage 3 pressure injury     moist pink granular tissue     0.5cm x 2cm x0.1cm w/o drainage or blistering   Lt Buttocks moist red granular tissue w/ slough     3cm x 1.5cm x0.2cm     no blistering or drainage  No odor, erythema, increased warmth, tenderness, induration, fluctuance, nor crepitus  Lt upper thigh wrapping posteriorly w/ 2 wounds    partial thickness moist red granular w/ grey/ tan slough    w/ serosanguinous drainage w/ green tinge  Lt Knee 5cm x 7cm x 0.2cm      dry black eschar  from wound edges w/o drainage  Lateral upper calf (3cm x 3cm x0cm) wounds dry adherent scab/eschar  Lt calf wound improve moist red granular tissue w/ less grey/ white slough      w/ serosanguinous drainage    10cm x 2.5cm x 0.4cm  No odor, erythema, increased warmth, tenderness, induration, fluctuance, nor crepitus        LABS/ CULTURES/ RADIOLOGY:              9.7    8.84  >-----------<  278      [04-10-25 @ 09:30]              32.8     136  |  99  |  24  ----------------------------<  64      [04-10-25 @ 09:30]  3.9   |  25  |  2.92        Ca     7.2     [04-10-25 @ 09:30]        A1C with Estimated Average Glucose Result: 5.7 % (12-21-24 @ 06:44)

## 2025-04-10 NOTE — PROGRESS NOTE ADULT - PROBLEM SELECTOR PLAN 1
CT chest with new RLL consolidation and LLL opacities, tracheal debris   -Suspect aspiration related s/p episode of vomiting  -S/p ABX as per ID  -Continue bronchodilators  -Keep sats >90% with o2 PRN  -Pt with low grade temp 3/30, dry cough. RVP negative, now afebrile. Continue to monitor fever curve  -Aspiration precautions  -CXR with L base atelectasis vs effusion, f/u official read.

## 2025-04-10 NOTE — PROGRESS NOTE ADULT - SUBJECTIVE AND OBJECTIVE BOX
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     Patient admitted 52 days ago s/p fall, knee trauma, and need to start HD. Patient w/ acute pain managed by primary team with escalating high dose opioids. Called for assistance in managing uncontrolled pain 2/2 multiple wounds, PAD and gangrenous toes despite high dose opioids.   Hospital course includes RLE angiogram w/ b/l iliac artery stents 3/3, PNA, RIJ DVT, Hypotension, pressure ulcers sacrum, buttocks, B/L heels, posterior left thigh, wound infections, likely sacral OM, 4/8 s/p wound debridement. Pt refusing diverting colostomy.    Out- patient pain regimen: None  Out Patient Pain Management provider: None  Pain Scores: 10/10 to 0/10    Pt w/ c/o pain 2/2 recent dressing change. States he was finally able to sleep overnight. Pt has not required any IV Dilaudid or PO Oxycodone since yesterday afternoon (20+ hrs). Pt is due for Oxy ER, RN will administer as well as IV Dilaudid dose for present pain. Pt w/ hypotension this AM, unlikely 2/2 opioids as he has been on OxyContin for a month+ and had not taken any Oxy or Dilaudid.    PHYSICAL EXAM  GENERAL: Seen at bedside, well-groomed, well-developed, appears stated age, no signs of toxicity  NEURO: Alert & Oriented X3, Good concentration; Follows commands; sensory exam decreased B/L LE L>R  HEENT: Head atraumatic, normocephalic; speech clear and fluent  GI: Appetite fair, (+)BM  : Voiding   EXTREMITIES: B/L LE (+)edema, (+)gangrenous toes, dressings C/D/I  PSYCH: affect normal; good eye contact; no signs of depression or anxiety      T(C): 36.5 (04-10-25 @ 10:58)  HR: 80 (04-10-25 @ 17:30)  BP: 103/49 (04-10-25 @ 17:30)  RR: 18 (04-10-25 @ 17:30)  SpO2: 95% (04-10-25 @ 12:47)      MEDICATIONS  (STANDING):  aspirin  chewable 81 milliGRAM(s) Oral daily  atorvastatin 40 milliGRAM(s) Oral at bedtime  bacitracin/polymyxin B Ointment 1 Application(s) Topical two times a day  chlorhexidine 2% Cloths 1 Application(s) Topical daily  clopidogrel Tablet 75 milliGRAM(s) Oral daily  Dakins Solution - 1/4 Strength 1 Application(s) Topical every 8 hours  epoetin aleah-epbx (RETACRIT) Injectable 21986 Unit(s) IV Push <User Schedule>  gabapentin 300 milliGRAM(s) Oral at bedtime  heparin   Injectable 5000 Unit(s) SubCutaneous every 8 hours  hydrOXYzine hydrochloride 25 milliGRAM(s) Oral three times a day  ibuprofen  Tablet. 400 milliGRAM(s) Oral every 12 hours  lactobacillus acidophilus 1 Tablet(s) Oral two times a day with meals  meropenem  IVPB 500 milliGRAM(s) IV Intermittent every 24 hours  methocarbamol 750 milliGRAM(s) Oral every 8 hours  midodrine. 10 milliGRAM(s) Oral <User Schedule>  mupirocin 2% Ointment 1 Application(s) Topical <User Schedule>  Nephro-margaret 1 Tablet(s) Oral daily  oxyCODONE  ER Tablet 20 milliGRAM(s) Oral every 8 hours  pantoprazole    Tablet 40 milliGRAM(s) Oral before breakfast  polyethylene glycol 3350 17 Gram(s) Oral daily  povidone iodine 10% Solution 1 Application(s) Topical two times a day  senna 2 Tablet(s) Oral at bedtime    MEDICATIONS  (PRN):  benzocaine/menthol Lozenge 1 Lozenge Oral three times a day PRN Sore Throat  bisacodyl 5 milliGRAM(s) Oral every 12 hours PRN Constipation  HYDROmorphone  Injectable 1 milliGRAM(s) IV Push every 6 hours PRN severe breakthrough pain  oxyCODONE    IR 10 milliGRAM(s) Oral every 4 hours PRN Severe Pain (7 - 10)  sodium chloride 0.9% lock flush 10 milliLiter(s) IV Push every 1 hour PRN Pre/post blood products, medications, blood draw, and to maintain line patency      Allergies    No Known Allergies        Pertinent labs, radiology, additional studies:  Reviewed                          9.7    8.84  )-----------( 278      ( 10 Apr 2025 09:30 )             32.8       04-10    136  |  99  |  24[H]  ----------------------------<  64[L]  3.9   |  25  |  2.92[H]    Ca    7.2[L]      10 Apr 2025 09:30

## 2025-04-10 NOTE — PROGRESS NOTE ADULT - ASSESSMENT
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 The initial plan was that he would present to the [facility] ER Monday 2/17 for HD initiation. However, day of admission, he fell and sustained trauma to his knee. Called to consult for cough, elevated R hemidiaphragm.

## 2025-04-10 NOTE — PROGRESS NOTE ADULT - ASSESSMENT
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     Patient admitted 52 days ago s/p fall, knee trauma, and need to start HD. Patient w/ acute pain managed by primary team with escalating high dose opioids. Called for assistance in managing uncontrolled pain 2/2 multiple wounds, PAD and gangrenous toes despite high dose opioids.   Hospital course includes RLE angiogram w/ b/l iliac artery stents 3/3, PNA, RIJ DVT, Hypotension, pressure ulcers sacrum, buttocks, B/L heels, posterior left thigh, wound infections, likely sacral OM, 4/8 s/p wound debridement. Pt refusing diverting colostomy.    Out- patient pain regimen: None  Out Patient Pain Management provider: None  Pain Scores: 10/10 to 0/10    Pt w/ c/o pain 2/2 recent dressing change. States he was finally able to sleep overnight. Pt has not required any IV Dilaudid or PO Oxycodone since yesterday afternoon (20+ hrs). Pt is due for Oxy ER, RN will administer as well as IV Dilaudid dose for present pain. Pt w/ hypotension this AM, unlikely 2/2 opioids as he has been on OxyContin for a month+ and had not taken any Oxy IR or IV Dilaudid.    Continue current regimen:  OxyContin 20 mg Q8h  Oxycodone 10 mg Q4h PRN severe pain  IV Dilaudid 1 mg Q6h PRN severe breakthrough pain  Robaxin 750 mg Q8h  Gabapentin 300 mg QHS (CrCl = 28.5 max daily dose is 700 mg)  Consider adding Tylenol around the clock  Ibuprofen per Nephrology    Warm/cool packs for comfort  Continue Bowel Regimen  Incentive Spirometer  PT per primary team  Monitor for sedation, respiratory depression  GAP on board (Geriatric and Palliative Care) for GOC in this patient with advanced illness  Out-patient pain practice list to be provided for pain management after discharge  Narcan Rescue Kit on discharge (Naloxone 4 mg/0.1 ml nasal spray - 1 spray q 2-3 minutes alternating between nostrils)    Time spent on encounter:    45    Minutes    Chronic Pain Service  [phone]

## 2025-04-10 NOTE — PROGRESS NOTE ADULT - SUBJECTIVE AND OBJECTIVE BOX
Date of Service: 04-10-25 @ 09:52    Patient is a 75y old  Male who presents with a chief complaint of ESRD requiring HD, uremia (10 Apr 2025 08:52)      Any change in ROS:   No new respiratory events overnight. Denies SOB/CP.  Pain well controlled this AM    MEDICATIONS  (STANDING):  aspirin  chewable 81 milliGRAM(s) Oral daily  atorvastatin 40 milliGRAM(s) Oral at bedtime  bacitracin/polymyxin B Ointment 1 Application(s) Topical two times a day  chlorhexidine 2% Cloths 1 Application(s) Topical daily  clopidogrel Tablet 75 milliGRAM(s) Oral daily  Dakins Solution - 1/4 Strength 1 Application(s) Topical every 8 hours  epoetin aleah-epbx (RETACRIT) Injectable 98749 Unit(s) IV Push <User Schedule>  gabapentin 300 milliGRAM(s) Oral at bedtime  heparin   Injectable 5000 Unit(s) SubCutaneous every 8 hours  hydrOXYzine hydrochloride 25 milliGRAM(s) Oral three times a day  ibuprofen  Tablet. 400 milliGRAM(s) Oral every 12 hours  lactobacillus acidophilus 1 Tablet(s) Oral two times a day with meals  meropenem  IVPB 500 milliGRAM(s) IV Intermittent every 24 hours  methocarbamol 750 milliGRAM(s) Oral every 8 hours  midodrine. 10 milliGRAM(s) Oral <User Schedule>  mupirocin 2% Ointment 1 Application(s) Topical <User Schedule>  Nephro-margaret 1 Tablet(s) Oral daily  oxyCODONE  ER Tablet 20 milliGRAM(s) Oral every 8 hours  pantoprazole    Tablet 40 milliGRAM(s) Oral before breakfast  polyethylene glycol 3350 17 Gram(s) Oral daily  povidone iodine 10% Solution 1 Application(s) Topical two times a day  senna 2 Tablet(s) Oral at bedtime    MEDICATIONS  (PRN):  benzocaine/menthol Lozenge 1 Lozenge Oral three times a day PRN Sore Throat  bisacodyl 5 milliGRAM(s) Oral every 12 hours PRN Constipation  HYDROmorphone  Injectable 1 milliGRAM(s) IV Push every 6 hours PRN severe breakthrough pain  oxyCODONE    IR 10 milliGRAM(s) Oral every 4 hours PRN Severe Pain (7 - 10)  sodium chloride 0.9% lock flush 10 milliLiter(s) IV Push every 1 hour PRN Pre/post blood products, medications, blood draw, and to maintain line patency    Vital Signs Last 24 Hrs  T(C): 36.7 (10 Apr 2025 00:27), Max: 36.7 (10 Apr 2025 00:27)  T(F): 98.1 (10 Apr 2025 00:27), Max: 98.1 (10 Apr 2025 00:27)  HR: 80 (10 Apr 2025 00:27) (80 - 80)  BP: 114/70 (10 Apr 2025 00:27) (114/70 - 120/68)  BP(mean): --  RR: 18 (10 Apr 2025 00:27) (18 - 18)  SpO2: 94% (10 Apr 2025 00:27) (94% - 95%)    Parameters below as of 10 Apr 2025 00:27  Patient On (Oxygen Delivery Method): room air        I&O's Summary    09 Apr 2025 07:01  -  10 Apr 2025 07:00  --------------------------------------------------------  IN: 0 mL / OUT: 200 mL / NET: -200 mL          Physical Exam:   GENERAL: NAD, well-groomed, well-developed  HEENT: VINNY/   Atraumatic, Normocephalic  ENMT: No tonsillar erythema, exudates, or enlargement; Moist mucous membranes, Good dentition, No lesions  NECK: Supple, No JVD, Normal thyroid  CHEST/LUNG: grossly clear   CVS: Regular rate and rhythm; No murmurs, rubs, or gallops  GI: : Soft, Nontender, Nondistended; Bowel sounds present  NERVOUS SYSTEM:  Alert & Oriented X3, Good concentration; Motor Strength 5/5 B/L upper and lower extremities; DTRs 2+ intact and symmetric  EXTREMITIES:  LE wounds   LYMPH: No lymphadenopathy noted  SKIN: No rashes or lesions  ENDOCRINOLOGY: No Thyromegaly  PSYCH: Appropriate    Labs:                              10.0   7.14  )-----------( 251      ( 08 Apr 2025 07:34 )             32.9                         9.3    7.32  )-----------( 284      ( 07 Apr 2025 08:46 )             30.2     04-09    134[L]  |  96  |  43[H]  ----------------------------<  56[L]  4.5   |  22  |  3.72[H]  04-08    133[L]  |  98  |  33[H]  ----------------------------<  63[L]  4.3   |  22  |  3.13[H]  04-07    134[L]  |  98  |  42[H]  ----------------------------<  95  4.6   |  22  |  3.94[H]    Ca    7.2[L]      09 Apr 2025 09:41      CAPILLARY BLOOD GLUCOSE              Urinalysis Basic - ( 09 Apr 2025 09:41 )    Color: x / Appearance: x / SG: x / pH: x  Gluc: 56 mg/dL / Ketone: x  / Bili: x / Urobili: x   Blood: x / Protein: x / Nitrite: x   Leuk Esterase: x / RBC: x / WBC x   Sq Epi: x / Non Sq Epi: x / Bacteria: x            RECENT CULTURES:  04-08 @ 20:58 Other Sacral Wound Swab                Few Pseudomonas aeruginosa  Commensal magali consistent with body site            Studies  < from: CT Abdomen and Pelvis w/ IV Cont (03.25.25 @ 13:43) >    ACC: [id number] EXAM:  CT CHEST IC   ORDERED BY:  NELL TOLBERT     ACC: [id number] EXAM:  CT ABDOMEN AND PELVIS IC   ORDERED BY:  NELL TOLBERT     PROCEDURE DATE:  03/25/2025          INTERPRETATION:  CLINICAL INFORMATION: Evaluate for pneumonia. Abdominal   pain.    COMPARISON: CT chest 2/23/2025. CT abdomen and pelvis 2/20/2025.    CONTRAST/COMPLICATIONS:  IV Contrast: Omnipaque 350  90 cc administered   10 cc discarded  Oral Contrast: NONE    PROCEDURE:  CT of the Chest, Abdomen and Pelvis was performed.  Sagittal and coronal reformats were performed.    FINDINGS:  CHEST:  LUNGS AND LARGE AIRWAYS: Patent central airways. Small amount of debris   within the trachea. Right lower lobe consolidative opacities. Additional   consolidative opacities in the left lower lobe, possibly atelectasis. A   few ill-defined nodular opacities in the left lower lobe measuring up to   6 mm.  PLEURA: Small left pleural effusion.  VESSELS: Right IJ approach catheter with tip in the superior cavoatrial   junction. Linear nonocclusive filling defect within the right internal   jugular vein (3:1). Atherosclerotic changes. Coronary artery   calcification.  HEART: Heart size is normal. Aortic valve and mitral annular   calcification. No pericardialeffusion.  MEDIASTINUM AND MARYANNE: No lymphadenopathy.  CHEST WALL AND LOWER NECK: Within normal limits.    ABDOMEN AND PELVIS:  LIVER: Subcentimeter hypodensity in the left lobe that is too small to   characterize.  BILE DUCTS: Normal caliber.  GALLBLADDER: Cholelithiasis.  SPLEEN: Within normal limits.  PANCREAS: Within normal limits.  ADRENALS: Within normal limits.  KIDNEYS/URETERS: Bilateral mild hydroureteronephrosis to the level of the   urinary bladder without evidence of obstructive urolithiasis, not   significantly changed from the prior exam of 2/20/2025. Diffuse   urothelial thickening of the bilateral ureters and collecting systems,   also similar to the previous CT. An exophytic hypodense right renal   lesion measuring 4.1 cm is unchanged.    BLADDER: Underdistended with a suprapubic catheter in place.  REPRODUCTIVE ORGANS: Enlarged prostate. Linear densities in the prostate,   possibly from previous UroLift procedure; correlate with the patient's   surgical history.    BOWEL:Rectal wall thickening. Sigmoid anastomosis. No evidence for bowel   obstruction. Normal appendix.  PERITONEUM/RETROPERITONEUM: Within normal limits.  VESSELS: Atherosclerotic changes. Bilateral common iliac/external iliac   artery stents. Previouslydescribed bilateral external iliac and   bilateral superficial femoral artery occlusions seen on the prior CTA is   not well assessed by this exam.  LYMPH NODES: Small left para-aortic lymph node measuring 1.1 x 0.9 cm,   unchanged.  ABDOMINAL WALL: Suprapubic catheter. Subcutaneous gas and subcutaneous   infiltration tracking through the right medial buttock. No discrete   associated collection within the field-of-view..  BONES: Degenerative changes. Bilateral femoral fixation hardware.    IMPRESSION:    CHEST:  *  Right lower lobe consolidative opacities, which may represent   pneumonia. Additional consolidative opacities in the left lower lobe,   potentially atelectasis or infection. A few ill-defined nodular opacities   in the left lower lobe measuring up to 6 mm may also be   infectious/inflammatory in etiology. Suggest continued attention on   follow-up imaging.  *  Small amount of debris in the trachea.  *  Small left pleural effusion.  *  Linear nonocclusive filling defect within the right internal jugular   vein, which may represent residual fibrin sheath or nonocclusive thrombus.    ABDOMEN AND PELVIS:  *  Subcutaneous gas and infiltration tracking along the medial right   buttock, potentially related to a decubitus wound with gas tracking from   the external environment. A necrotizing airforming infection is also a   possibility. Correlate with physical exam.  *  Rectal wall thickening, suggestive of proctitis.  *  Bilateral mild hydroureteronephrosis to the level of the urinary   bladder without evidence of obstructive urolithiasis, and not   significantly changed since 2/20/2025.  *  Diffuse urothelial thickening of the bilateral renal collecting   systems and ureters, which may be seen with an ascending urinary tract   infection or inflammation, similar to the previous CT 2/20/2025.    Findings were discussed with Dr. NELL TOLBERT 3/25/2025 4:30 PM by   Dr. Calzada.    --- End of Report ---          < end of copied text >             Date of Service: 04-10-25 @ 09:52    Patient is a 75y old  Male who presents with a chief complaint of ESRD requiring HD, uremia (10 Apr 2025 08:52)      Any change in ROS:   No new respiratory events overnight. Denies SOB/CP.  Pain well controlled this AM    MEDICATIONS  (STANDING):  aspirin  chewable 81 milliGRAM(s) Oral daily  atorvastatin 40 milliGRAM(s) Oral at bedtime  bacitracin/polymyxin B Ointment 1 Application(s) Topical two times a day  chlorhexidine 2% Cloths 1 Application(s) Topical daily  clopidogrel Tablet 75 milliGRAM(s) Oral daily  Dakins Solution - 1/4 Strength 1 Application(s) Topical every 8 hours  epoetin aleah-epbx (RETACRIT) Injectable 71220 Unit(s) IV Push <User Schedule>  gabapentin 300 milliGRAM(s) Oral at bedtime  heparin   Injectable 5000 Unit(s) SubCutaneous every 8 hours  hydrOXYzine hydrochloride 25 milliGRAM(s) Oral three times a day  ibuprofen  Tablet. 400 milliGRAM(s) Oral every 12 hours  lactobacillus acidophilus 1 Tablet(s) Oral two times a day with meals  meropenem  IVPB 500 milliGRAM(s) IV Intermittent every 24 hours  methocarbamol 750 milliGRAM(s) Oral every 8 hours  midodrine. 10 milliGRAM(s) Oral <User Schedule>  mupirocin 2% Ointment 1 Application(s) Topical <User Schedule>  Nephro-margaret 1 Tablet(s) Oral daily  oxyCODONE  ER Tablet 20 milliGRAM(s) Oral every 8 hours  pantoprazole    Tablet 40 milliGRAM(s) Oral before breakfast  polyethylene glycol 3350 17 Gram(s) Oral daily  povidone iodine 10% Solution 1 Application(s) Topical two times a day  senna 2 Tablet(s) Oral at bedtime    MEDICATIONS  (PRN):  benzocaine/menthol Lozenge 1 Lozenge Oral three times a day PRN Sore Throat  bisacodyl 5 milliGRAM(s) Oral every 12 hours PRN Constipation  HYDROmorphone  Injectable 1 milliGRAM(s) IV Push every 6 hours PRN severe breakthrough pain  oxyCODONE    IR 10 milliGRAM(s) Oral every 4 hours PRN Severe Pain (7 - 10)  sodium chloride 0.9% lock flush 10 milliLiter(s) IV Push every 1 hour PRN Pre/post blood products, medications, blood draw, and to maintain line patency    Vital Signs Last 24 Hrs  T(C): 36.7 (10 Apr 2025 00:27), Max: 36.7 (10 Apr 2025 00:27)  T(F): 98.1 (10 Apr 2025 00:27), Max: 98.1 (10 Apr 2025 00:27)  HR: 80 (10 Apr 2025 00:27) (80 - 80)  BP: 114/70 (10 Apr 2025 00:27) (114/70 - 120/68)  BP(mean): --  RR: 18 (10 Apr 2025 00:27) (18 - 18)  SpO2: 94% (10 Apr 2025 00:27) (94% - 95%)    Parameters below as of 10 Apr 2025 00:27  Patient On (Oxygen Delivery Method): room air        I&O's Summary    09 Apr 2025 07:01  -  10 Apr 2025 07:00  --------------------------------------------------------  IN: 0 mL / OUT: 200 mL / NET: -200 mL          Physical Exam:   GENERAL: NAD, well-groomed, well-developed  HEENT: VINNY/   Atraumatic, Normocephalic  ENMT: No tonsillar erythema, exudates, or enlargement; Moist mucous membranes, Good dentition, No lesions  NECK: Supple, No JVD, Normal thyroid  CHEST/LUNG: grossly clear   CVS: Regular rate and rhythm; No murmurs, rubs, or gallops  GI: : Soft, Nontender, Nondistended; Bowel sounds present  NERVOUS SYSTEM:  Alert & Oriented X3, Good concentration; Motor Strength 5/5 B/L upper and lower extremities; DTRs 2+ intact and symmetric  EXTREMITIES:  LE wounds   LYMPH: No lymphadenopathy noted  SKIN: No rashes or lesions  ENDOCRINOLOGY: No Thyromegaly  PSYCH: Appropriate    Labs:                              10.0   7.14  )-----------( 251      ( 08 Apr 2025 07:34 )             32.9                         9.3    7.32  )-----------( 284      ( 07 Apr 2025 08:46 )             30.2     04-09    134[L]  |  96  |  43[H]  ----------------------------<  56[L]  4.5   |  22  |  3.72[H]  04-08    133[L]  |  98  |  33[H]  ----------------------------<  63[L]  4.3   |  22  |  3.13[H]  04-07    134[L]  |  98  |  42[H]  ----------------------------<  95  4.6   |  22  |  3.94[H]    Ca    7.2[L]      09 Apr 2025 09:41      CAPILLARY BLOOD GLUCOSE              Urinalysis Basic - ( 09 Apr 2025 09:41 )    Color: x / Appearance: x / SG: x / pH: x  Gluc: 56 mg/dL / Ketone: x  / Bili: x / Urobili: x   Blood: x / Protein: x / Nitrite: x   Leuk Esterase: x / RBC: x / WBC x   Sq Epi: x / Non Sq Epi: x / Bacteria: x            RECENT CULTURES:  04-08 @ 20:58 Other Sacral Wound Swab                Few Pseudomonas aeruginosa  Commensal magali consistent with body site            Studies  < from: CT Abdomen and Pelvis w/ IV Cont (03.25.25 @ 13:43) >    ACC: [id number] EXAM:  CT CHEST IC   ORDERED BY:  NELL TOLBERT     ACC: [id number] EXAM:  CT ABDOMEN AND PELVIS IC   ORDERED BY:  NELL TOLBERT     PROCEDURE DATE:  03/25/2025          INTERPRETATION:  CLINICAL INFORMATION: Evaluate for pneumonia. Abdominal   pain.    COMPARISON: CT chest 2/23/2025. CT abdomen and pelvis 2/20/2025.    CONTRAST/COMPLICATIONS:  IV Contrast: Omnipaque 350  90 cc administered   10 cc discarded  Oral Contrast: NONE    PROCEDURE:  CT of the Chest, Abdomen and Pelvis was performed.  Sagittal and coronal reformats were performed.    FINDINGS:  CHEST:  LUNGS AND LARGE AIRWAYS: Patent central airways. Small amount of debris   within the trachea. Right lower lobe consolidative opacities. Additional   consolidative opacities in the left lower lobe, possibly atelectasis. A   few ill-defined nodular opacities in the left lower lobe measuring up to   6 mm.  PLEURA: Small left pleural effusion.  VESSELS: Right IJ approach catheter with tip in the superior cavoatrial   junction. Linear nonocclusive filling defect within the right internal   jugular vein (3:1). Atherosclerotic changes. Coronary artery   calcification.  HEART: Heart size is normal. Aortic valve and mitral annular   calcification. No pericardialeffusion.  MEDIASTINUM AND MARYANNE: No lymphadenopathy.  CHEST WALL AND LOWER NECK: Within normal limits.    ABDOMEN AND PELVIS:  LIVER: Subcentimeter hypodensity in the left lobe that is too small to   characterize.  BILE DUCTS: Normal caliber.  GALLBLADDER: Cholelithiasis.  SPLEEN: Within normal limits.  PANCREAS: Within normal limits.  ADRENALS: Within normal limits.  KIDNEYS/URETERS: Bilateral mild hydroureteronephrosis to the level of the   urinary bladder without evidence of obstructive urolithiasis, not   significantly changed from the prior exam of 2/20/2025. Diffuse   urothelial thickening of the bilateral ureters and collecting systems,   also similar to the previous CT. An exophytic hypodense right renal   lesion measuring 4.1 cm is unchanged.    BLADDER: Underdistended with a suprapubic catheter in place.  REPRODUCTIVE ORGANS: Enlarged prostate. Linear densities in the prostate,   possibly from previous UroLift procedure; correlate with the patient's   surgical history.    BOWEL:Rectal wall thickening. Sigmoid anastomosis. No evidence for bowel   obstruction. Normal appendix.  PERITONEUM/RETROPERITONEUM: Within normal limits.  VESSELS: Atherosclerotic changes. Bilateral common iliac/external iliac   artery stents. Previouslydescribed bilateral external iliac and   bilateral superficial femoral artery occlusions seen on the prior CTA is   not well assessed by this exam.  LYMPH NODES: Small left para-aortic lymph node measuring 1.1 x 0.9 cm,   unchanged.  ABDOMINAL WALL: Suprapubic catheter. Subcutaneous gas and subcutaneous   infiltration tracking through the right medial buttock. No discrete   associated collection within the field-of-view..  BONES: Degenerative changes. Bilateral femoral fixation hardware.    IMPRESSION:    CHEST:  *  Right lower lobe consolidative opacities, which may represent   pneumonia. Additional consolidative opacities in the left lower lobe,   potentially atelectasis or infection. A few ill-defined nodular opacities   in the left lower lobe measuring up to 6 mm may also be   infectious/inflammatory in etiology. Suggest continued attention on   follow-up imaging.  *  Small amount of debris in the trachea.  *  Small left pleural effusion.  *  Linear nonocclusive filling defect within the right internal jugular   vein, which may represent residual fibrin sheath or nonocclusive thrombus.    ABDOMEN AND PELVIS:  *  Subcutaneous gas and infiltration tracking along the medial right   buttock, potentially related to a decubitus wound with gas tracking from   the external environment. A necrotizing airforming infection is also a   possibility. Correlate with physical exam.  *  Rectal wall thickening, suggestive of proctitis.  *  Bilateral mild hydroureteronephrosis to the level of the urinary   bladder without evidence of obstructive urolithiasis, and not   significantly changed since 2/20/2025.  *  Diffuse urothelial thickening of the bilateral renal collecting   systems and ureters, which may be seen with an ascending urinary tract   infection or inflammation, similar to the previous CT 2/20/2025.    Findings were discussed with Dr. NELL TOLBERT 3/25/2025 4:30 PM by   Dr. Calzada.    --- End of Report ---      rad< from: Xray Chest 1 View- PORTABLE-Urgent (Xray Chest 1 View- PORTABLE-Urgent .) (04.09.25 @ 17:11) >    ACC: [id number] EXAM:  XR CHEST PORTABLE URGENT 1V   ORDERED BY: ASHLEY ROUSE     PROCEDURE DATE:  04/09/2025          INTERPRETATION:  EXAMINATION: XR CHEST URGENT    CLINICAL INDICATION: r/o pneumonia    TECHNIQUE: Single frontal, portable view of the chest was obtained.    COMPARISON: Chest x-ray 3/23/2025.    FINDINGS:  Right IJ central venous catheter terminating in the cavoatrial junction.  Elevated right hemidiaphragm.  The heart is unchanged in size.  The lungs are clear.  There is no pneumothorax or pleural effusion.    IMPRESSION:  Clear lungs.    --- End of Report ---          KENN CARDENAS MD; Resident Radiologist  This document has been electronically signed.  CLARENCE MATHEWS MD; Attending Radiologist  This document hasbeen electronically signed. Apr 10 2025 11:53AM    < end of copied text >      < end of copied text >

## 2025-04-10 NOTE — PROGRESS NOTE ADULT - SUBJECTIVE AND OBJECTIVE BOX
[facility]  SABINO Griffin Y. Patel, S. Shah, G. Casimir  [phone]  ([phone] - weekdays after 5pm and weekends)    Name: BRIAN DEMPSEY  Age/Gender: 75y Male  MRN: [id number]    Interval History:  Patient seen and examined this morning.   Resting comfortably, no new complaints noted.  Notes reviewed  No concerning overnight events  Afebrile   Allergies: No Known Allergies      Objective:  Vitals:   T(F): 98.1 (04-10-25 @ 00:27), Max: 98.1 (04-10-25 @ 00:27)  HR: 80 (04-10-25 @ 00:27) (80 - 80)  BP: 114/70 (04-10-25 @ 00:27) (114/70 - 120/68)  RR: 18 (04-10-25 @ 00:27) (18 - 18)  SpO2: 94% (04-10-25 @ 00:27) (94% - 95%)  Physical Examination:  General: no acute distress, nontoxic   HEENT: normocephalic, atraumatic, anicteric  Respiratory: no acc muscle use, breathing comfortably  Cardiovascular: S1 and S2 present  Gastrointestinal: normal appearing, nondistended  Extremities: b/l LE dressing, necrotic toes     Laboratory Studies:  CBC:   WBC Trend:  7.14 04-08-25 @ 07:34  7.32 04-07-25 @ 08:46  6.03 04-06-25 @ 09:05  6.30 04-05-25 @ 11:25    CMP: 04-09    134[L]  |  96  |  43[H]  ----------------------------<  56[L]  4.5   |  22  |  3.72[H]    Ca    7.2[L]      09 Apr 2025 09:41      Creatinine: 3.72 mg/dL (04-09-25 @ 09:41)  Creatinine: 3.13 mg/dL (04-08-25 @ 07:34)  Creatinine: 3.94 mg/dL (04-07-25 @ 08:46)  Creatinine: 3.36 mg/dL (04-06-25 @ 09:05)  Creatinine: 2.83 mg/dL (04-05-25 @ 11:25)    Microbiology: reviewed   Culture - Acid Fast - Other w/Smear (collected 04-08-25 @ 20:58)  Source: Other Sacral Wound Swab    Culture - Fungal, Other (collected 04-08-25 @ 20:58)  Source: Other Sacral Wound Swab  Preliminary Report (04-09-25 @ 08:09):    Testing in progress    Culture - Wound Aerobic (collected 04-08-25 @ 20:58)  Source: Other Sacral Wound Swab  Preliminary Report (04-09-25 @ 23:55):    Few Pseudomonas aeruginosa    Commensal magali consistent with body site    Radiology: reviewed     Medications:  aspirin  chewable 81 milliGRAM(s) Oral daily  atorvastatin 40 milliGRAM(s) Oral at bedtime  bacitracin/polymyxin B Ointment 1 Application(s) Topical two times a day  benzocaine/menthol Lozenge 1 Lozenge Oral three times a day PRN  bisacodyl 5 milliGRAM(s) Oral every 12 hours PRN  chlorhexidine 2% Cloths 1 Application(s) Topical daily  clopidogrel Tablet 75 milliGRAM(s) Oral daily  Dakins Solution - 1/4 Strength 1 Application(s) Topical every 8 hours  epoetin aleah-epbx (RETACRIT) Injectable 50467 Unit(s) IV Push <User Schedule>  gabapentin 300 milliGRAM(s) Oral at bedtime  heparin   Injectable 5000 Unit(s) SubCutaneous every 8 hours  HYDROmorphone  Injectable 1 milliGRAM(s) IV Push every 6 hours PRN  hydrOXYzine hydrochloride 25 milliGRAM(s) Oral three times a day  ibuprofen  Tablet. 400 milliGRAM(s) Oral every 12 hours  lactobacillus acidophilus 1 Tablet(s) Oral two times a day with meals  meropenem  IVPB 500 milliGRAM(s) IV Intermittent every 24 hours  methocarbamol 750 milliGRAM(s) Oral every 8 hours  midodrine. 10 milliGRAM(s) Oral <User Schedule>  mupirocin 2% Ointment 1 Application(s) Topical <User Schedule>  Nephro-margaret 1 Tablet(s) Oral daily  oxyCODONE    IR 10 milliGRAM(s) Oral every 4 hours PRN  oxyCODONE  ER Tablet 20 milliGRAM(s) Oral every 8 hours  pantoprazole    Tablet 40 milliGRAM(s) Oral before breakfast  polyethylene glycol 3350 17 Gram(s) Oral daily  povidone iodine 10% Solution 1 Application(s) Topical two times a day  senna 2 Tablet(s) Oral at bedtime  sodium chloride 0.9% lock flush 10 milliLiter(s) IV Push every 1 hour PRN    Current Antimicrobials:  meropenem  IVPB 500 milliGRAM(s) IV Intermittent every 24 hours    Prior/Completed Antimicrobials:  piperacillin/tazobactam IVPB.  piperacillin/tazobactam IVPB.  piperacillin/tazobactam IVPB.-  piperacillin/tazobactam IVPB.-  trimethoprim   80 mG/sulfamethoxazole 400 mG  trimethoprim  160 mG/sulfamethoxazole 800 mG  vancomycin  IVPB  vancomycin  IVPB

## 2025-04-10 NOTE — PROGRESS NOTE ADULT - ASSESSMENT
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 S/p HD yesterday, due tomorrow     (2)Hyponatremia - improving, on high-Na+ bath with HD    (3)Anemia - s/p IV iron; on Retacrit with HD    (4)PAD - s/p LE bypass 3/3/25     (5)CV - tenuous hemodynamics at onset of HD today - indicated for Midodrine    (6)Pain - in association with sacral ulceration, PAD    RECOMMEND:   (1)HD tomorrow  (2)Midodrine 10mg po TIW, pre HD    Beryl Darden [facility]  [phone]

## 2025-04-10 NOTE — PROGRESS NOTE ADULT - PROBLEM SELECTOR PLAN 5
Goals are establish, pain management per pain team. Palliative care consult appreciated and completed. Palliative care team will sign off. The team remains available if additional services are needed. Please reconsult as needed. Discussed with the primary team.    Gerda Gunter MD   Geriatrics and Palliative Care Attending   [facility]

## 2025-04-10 NOTE — PROGRESS NOTE ADULT - SUBJECTIVE AND OBJECTIVE BOX
SUBJECTIVE AND OBJECTIVE:  Indication for Geriatrics and Palliative Care Services/INTERVAL HPI: following for goals of care     OVERNIGHT EVENTS: No acute events reported overnight    This morning, the patient was noted to be hypotensive. The family expressed concern that this might be secondary to opioid medications. I explained that hypotension in this setting could be multifactorial. While high-dose opioids and muscle relaxants can contribute, along dialysis the patient's current opioid dose has not been drastically increased.    DNR on chart: yes   DNI      Allergies    No Known Allergies    Intolerances    MEDICATIONS  (STANDING):  aspirin  chewable 81 milliGRAM(s) Oral daily  atorvastatin 40 milliGRAM(s) Oral at bedtime  bacitracin/polymyxin B Ointment 1 Application(s) Topical two times a day  chlorhexidine 2% Cloths 1 Application(s) Topical daily  clopidogrel Tablet 75 milliGRAM(s) Oral daily  Dakins Solution - 1/4 Strength 1 Application(s) Topical every 8 hours  epoetin aleah-epbx (RETACRIT) Injectable 96785 Unit(s) IV Push <User Schedule>  gabapentin 300 milliGRAM(s) Oral at bedtime  heparin   Injectable 5000 Unit(s) SubCutaneous every 8 hours  hydrOXYzine hydrochloride 25 milliGRAM(s) Oral three times a day  ibuprofen  Tablet. 400 milliGRAM(s) Oral every 12 hours  lactobacillus acidophilus 1 Tablet(s) Oral two times a day with meals  meropenem  IVPB 500 milliGRAM(s) IV Intermittent every 24 hours  methocarbamol 750 milliGRAM(s) Oral every 8 hours  midodrine. 10 milliGRAM(s) Oral <User Schedule>  mupirocin 2% Ointment 1 Application(s) Topical <User Schedule>  Nephro-margaret 1 Tablet(s) Oral daily  oxyCODONE  ER Tablet 20 milliGRAM(s) Oral every 8 hours  pantoprazole    Tablet 40 milliGRAM(s) Oral before breakfast  polyethylene glycol 3350 17 Gram(s) Oral daily  povidone iodine 10% Solution 1 Application(s) Topical two times a day  senna 2 Tablet(s) Oral at bedtime    MEDICATIONS  (PRN):  benzocaine/menthol Lozenge 1 Lozenge Oral three times a day PRN Sore Throat  bisacodyl 5 milliGRAM(s) Oral every 12 hours PRN Constipation  HYDROmorphone  Injectable 1 milliGRAM(s) IV Push every 6 hours PRN severe breakthrough pain  oxyCODONE    IR 10 milliGRAM(s) Oral every 4 hours PRN Severe Pain (7 - 10)  sodium chloride 0.9% lock flush 10 milliLiter(s) IV Push every 1 hour PRN Pre/post blood products, medications, blood draw, and to maintain line patency      ITEMS UNCHECKED ARE NOT PRESENT    PRESENT SYMPTOMS: [ ]Unable to self-report - see  CPOT, PAINADS, RDOS below  Source if other than patient:  [ ]Family   [ ]Team     Pain:  [x ]yes [ ]no  QOL impact - not able to rest or perform ALDs   Location -    generalized pain, mostly in the sacral region                 Aggravating factors - none identify   Quality - sore and dull pain   Radiation -  Timing- constant   Severity (0-10 scale):  Minimal acceptable level (0-10 scale):     Dyspnea:                           [ ]Mild [ ]Moderate [ ]Severe  Anxiety:                             [ ]Mild [ ]Moderate [ ]Severe  Fatigue:                             [ ]Mild [x ]Moderate [ ]Severe  Nausea:                             [ ]Mild [ ]Moderate [ ]Severe  Loss of appetite:              [ ]Mild [ ]Moderate [ ]Severe  Constipation:                    [ ]Mild [ ]Moderate [ ]Severe    PCSSQ[Palliative Care Spiritual Screening Question]   Severity (0-10):  Score of 4 or > indicate consideration of Chaplaincy referral.  Chaplaincy Referral: [x ] yes [ ] refused [ ] following    Caregiver [place]? : [ ] yes [x ] no  [ ] deferred:  Social work referral [ ] Patient & Family Centered Care Referral [ ]     Anticipatory Grief present?:  [ ] yes [x ] no  [ ] deferred: Social work referral [ ] Patient & Family Centered Care Referral [ ]      Other Symptoms:  [x ]All other review of systems negative     PHYSICAL EXAM:  Vital Signs Last 24 Hrs  T(C): 36.5 (10 Apr 2025 10:58), Max: 36.7 (10 Apr 2025 00:27)  T(F): 97.7 (10 Apr 2025 10:58), Max: 98.1 (10 Apr 2025 00:27)  HR: 77 (10 Apr 2025 12:47) (77 - 85)  BP: 103/46 (10 Apr 2025 12:47) (87/50 - 114/70)  BP(mean): --  RR: 18 (10 Apr 2025 12:47) (18 - 18)  SpO2: 95% (10 Apr 2025 12:47) (94% - 95%)    Parameters below as of 10 Apr 2025 12:47  Patient On (Oxygen Delivery Method): room air     I&O's Summary    09 Apr 2025 07:01  -  10 Apr 2025 07:00  --------------------------------------------------------  IN: 0 mL / OUT: 200 mL / NET: -200 mL    10 Apr 2025 07:01  -  10 Apr 2025 15:19  --------------------------------------------------------  IN: 240 mL / OUT: 0 mL / NET: 240 mL       GENERAL:  [ ]Cachexia    [x ]Alert  [x ]Oriented x 4  [ ]Lethargic  [ ]Unarousable  [x ]Verbal  [ ]Non-Verbal  Behavioral:   [ ] Anxiety  [ ] Delirium [ ] Agitation [x ] Other: calm   HEENT:  [ x]Normal   [ ]Dry mouth   [ ]ET Tube/Trach  [ ]Oral lesions  PULMONARY:   [ ]Clear [ ]Tachypnea  [ ]Audible excessive secretions   [ ]Rhonchi        [ ]Right [ ]Left [ ]Bilateral  [ ]Crackles        [ ]Right [ ]Left [ ]Bilateral  [ ]Wheezing     [ ]Right [ ]Left [ ]Bilateral  [x ]Diminished breath sounds [ ]right [ ]left [ x]bilateral  CARDIOVASCULAR:    [ x]Regular [ ]Irregular [ ]Tachy  [ ]Victor M [ ]Murmur [ ]Other  GASTROINTESTINAL:  [ ]Soft  [ x]Distended   [ ]+BS  [x ]Non tender [ ]Tender  [ ]Other [ ]PEG [ ]OGT/ NGT  Last BM: 4/6  GENITOURINARY:  [ ]Normal [ ] Incontinent   [ ]Oliguria/Anuria   [ ]Murillo [x] suprapubic cath   MUSCULOSKELETAL:   [ ]Normal   [ ]Weakness  [x ]Bed/Wheelchair bound [ ]Edema  NEUROLOGIC:   [x ]No focal deficits  [ ]Cognitive impairment  [ ]Dysphagia [ ]Dysarthria [ ]Paresis [ ]Other   SKIN: Please see RN documentation which I have reviewed.  Sacral, Rt Ischial, and Left Buttock wounds  BLE severe PAD w/ extensive wounds   [ ]Normal  [ ]Rash  [ ]Other  [x ]Pressure ulcer(s)       Present on admission [x ]y [ ]n    CRITICAL CARE:  [ ] Shock Present  [ ]Septic [ ]Cardiogenic [ ]Neurologic [ ]Hypovolemic  [ ]  Vasopressors [ ]  Inotropes   [ ]Respiratory failure present [ ]Mechanical ventilation [ ]Non-invasive ventilatory support [ ]High flow    [ ]Acute  [ ]Chronic [ ]Hypoxic  [ ]Hypercarbic [ ]Other  [ x]Other organ failure: skin       LABS:                        9.7    8.84  )-----------( 278      ( 10 Apr 2025 09:30 )             32.8   04-10    136  |  99  |  24[H]  ----------------------------<  64[L]  3.9   |  25  |  2.92[H]    Ca    7.2[L]      10 Apr 2025 09:30        Urinalysis Basic - ( 10 Apr 2025 09:30 )    Color: x / Appearance: x / SG: x / pH: x  Gluc: 64 mg/dL / Ketone: x  / Bili: x / Urobili: x   Blood: x / Protein: x / Nitrite: x   Leuk Esterase: x / RBC: x / WBC x   Sq Epi: x / Non Sq Epi: x / Bacteria: x      RADIOLOGY & ADDITIONAL STUDIES: reviewed none new    Protein Calorie Malnutrition Present: [ ]mild [ ]moderate [ ]severe [ ]underweight [ ]morbid obesity  https://www.andeal.org/vault/2440/web/files/ONC/Table_Clinical%20Characteristics%20to%20Document%20Malnutrition-White%20JV%20et%20al%202012.pdf    Height (cm): 172.7 (04-08-25 @ 15:25), 172.7 (02-04-25 @ 16:31), 172.7 (12-20-24 @ 12:36)  Weight (kg): 90.7 (04-08-25 @ 15:25), 90.7 (02-04-25 @ 16:31), 94.3 (12-20-24 @ 12:36)  BMI (kg/m2): 30.4 (04-08-25 @ 15:25), 30.4 (02-04-25 @ 16:31), 31.6 (12-20-24 @ 12:36)    [ ]PPSV2 < or = 30%  [ ]significant weight loss [ ]poor nutritional intake [ ]anasarca[ ]Artificial Nutrition    Other REFERRALS:  [ ]Hospice  [ ]Child Life  [ ]Social Work  [ ]Case management [ ]Holistic Therapy     Palliative Performance Scale:  http://npcrc.org/files/news/palliative_performance_scale_ppsv2.pdf  (Ctrl +  left click to view)  Respiratory Distress Observation Tool:  https://homecareinformation.net/handouts/hen/Respiratory_Distress_Observation_Scale.pdf (Ctrl +  left click to view)  PAINAD Score:  http://geriatrictoolkit.[place].[facility]/cog/painad.pdf (Ctrl +  left click to view)

## 2025-04-10 NOTE — PROGRESS NOTE ADULT - ASSESSMENT
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 He is well known to me from multiple recent admisions  s/p admissions at [facility] 12/20-12/25/24 and 2/4-2/7 with SONDRA on CKD with associated uremic symptoms.   At the last admission he had expressed strong interest to try to hold off with HD intiation but he has been getting worse clinically at home.  His SONDRA and uremic symptoms significantly improved after the first admission; they improved a to mild extent during the 2nd admission to the point where he was able to be discharged without HD. Since then, however, he has worsened further.   He has been suffering from progressively worsening diffuse pruritus, loss of appetite, and generalized weakness and falls.   His nephrologist and PCP has been speaking with him and his family over the past few days,   The initial plan was that he would present to the [facility] ER Monday 2/17 (ie tomorrow) for HD initiation. Today, however, he fell and sustained trauma to his knee. Given the fall and concern for knee fracture, he presented to the ER today. multiple xrays show no Fractures.      CKD5, uremia, requiring initiation of HD  Rash, seborrheic dermatitis  Pruritis  Anemia  PAD  SP catheter, chronic  Left inferior pole acute on chronic patella Fx    plan  - HD via R subclavian permacath  -4/10:  pain is controlled, on Hemal for P. aeruginosa growing in sacral decub debridement Cx.ptn is DNR/DNI, was seen by palliaitive care. being followed by pain for pain management in LE and 2/2 sacral decubiti.  awaiting dressing change by surgical team.   -4/9: s/p debridement in OR by general surgery. pain consult and palliative consult reviewed. pain regimen reviewed w pain team.  they d/w ptn and daughter. ptn refused for the dressing to be changed by surgical team today. they tried several times. wound care post op is very important in healing process. meropenem restarted  -4/8: ptn is awaiting debridement in the OR today, debulking and wound care at the bedside has been challenging 2/2 pain not allowing 2/2 pain and fear of pain  - 4/7: ptn complaining his pain meds are not covering his pain completely. yesterday he was talking about death a lot and how he wished to die. had psych eval today. today he is doing better emotionally, not talking about drath and wants to cont treatment. he is tolerating HD, he finished ABx for PNA last week. his wounds are the primary reason of admission at this point.  I also called palliaitive consult.   He was seen by surgery for debridement of sacral decub, ptn is refusing diverting colostomy. on admission ptn had intact skin but did show primary stages of pressure injury in the sacral region. ptn didnt allow us to turn him 2/2 pain in LLE for several days, despite our concern of further pressure injury on the sacrum. once he was turned we discovered skin break and started wound care. again he would not allow us to turn him. he said the wound is ok, its scabbed, Dee Dee( his wife) is handling it. I called wound care to see him, wound care discovered that the area had necrosis, not scabs, debulking at the bedside was performed, ptn complained it was extremely painful. ptn was seen by wound care attending and recommended debridement in the OR under anesthesia. it is planned for 4/9. it was also recommended he should have a diverting colostomy. ptn is refusing it. HCP his daughter Yanique aware.   -4/6: spoke w ptn's daughter Yanique on the phone today x 20 min, spoke to ptn , his wife and daughter yanique at the bedside x 60 min today. ptn is agreeing to debridement in the OR, adamantly refusing diverting colostomy he would consider it if post debridement he would not improve and would require another debridement. ptn was seen by urology and ID bc daughter complained increased amount of "pus" at the SPT, both ID and urology feel there is no acute infection/cellulitis, the drainage at the site and expected and not unusual. daughter and wife state ptn didnt have sacral wound when he came. on admission with the aide of his wife we flipped him and he had 1st degree pressure decubiti sacrally, skin was intact.   - 4/5: thorough eval by wound care attending 4/4/25, case d/w wound care attending Dr Dugan. as per her recommendation placed general surgery consult for OR debridement and diverting colostomy. ptn had sacral decubiti prior to admission  -4/4: dr dugan from wound care had an extensive conversation w the ptn and daughter yanique on the phone at bedside. ptn needs general surgery consult for OR debridement of necrotic tissue of the sacral decubiti and diverting colostomy. ptn did not tolerate debridement at the bedside. . completed Abx for PNA  -4/3: ptn's insurance changed to straight medicare/medicaid. now able to go to [facility], but ptn wants to go home. will need equipment set up. this was d/w CM and ACP, daughter Yanique on the phone and wife at bedside today. today is last day of ABx. will transition pain meds to po DIlaudid from IV, will also need outptn pain management F/U, outptn wound care, home PT, need outptn HD set up  -4/2: seen prior to going to HD, no new events. tomorrow completing 10 day course of Meropenem. ongoing need for narcotics 2/2 pain.   -4/1: ptn is frustrated about a prolonged hospitalization but states he feels better overall and once medically cleared he will be ready to go home, not sooner than 4/7. completing ABx on 4/3.   -3/31: at HD today had 1.7 L UF, post HD was hypotense, gave gentle IVF , BP still a bit low. afebrile, loose BMs but not diarrhea, GI PCR not sent, RVP neg. . on Meropenem course for total of 10 days, last day 4/3, would care for decubiti and left knee wound post trauma  -3/30: Tmax 100.9, c/o dry cough and diarrhea but not watery. pain is controlled at the time of visit. wife at bedside. will check RVP panel, GI PCR, sine diarrhea non watery will not order C. Diff. ID to follow  - 3/29: cont meropenem, seen by coverage  - 3/28: pain from decubitus ulcer and Left knee post debridement continues. wound care recs in place. on Meropenem for PNA, pulm and ID following. HD as per renal. HDS  -3/27: ptn is complaining that pain post decubital and left knee debridement has been severe. his daughter is on speaker phone, wife is at bedside. his pain is controlled when meds are administered. he is very uncomfortable due to the pain at the site of decubitus debridement. lyrica helped his pain before but made him drowsy, griffin with gabapentin. he doesnt want them to be resumed. will cont dilaudid, oxycodone, oxycontin, ibuprofen. he is on Meropenem for aspiration PNA/HCAP. [facility] [place]px. seen by vascular cardiology to address R IJ post shiley non occlusive DVT. full AC was not recommended. will check rpt doppler in 3-4 weeks.   - 3/26: Wound care performed bedside debridement of Left knee wound 2/2 lifted eschar, and sacral decubitus. findings c/w possible infection and mainly fat necrosis. cont Meropenem. ID following. podiatry following for gangrenous toes. will give additional single dose IV DIlaudid 2/2 severe pain post debridement.    - 3/25: wife at bedside, daughter on speaker phone at bedside. ptn is pain free at the time of visit and states he wants to cont the pain regiment he is presently on. ct C/A/P revealed: RLL PNA, prob aspiration, stercoral proctitis, decubitus ulcer w possible progression to sacral OM, R IJ nonocclusive DVT. these findings d/w ptn ,his family, wound care, ACP, Vascular, Nephrology, ID, pUlm, GI. meropenem initiated, Bactrim stopped. ptn states when he is cleared for DC, he wants to go home , not to [facility], not to SNF. daughter and wife aware.   -3/24: ptn  was seen by GI for N/V, its not clear the etiology, will obtain Ct C/A/P. ptn states he is coughing up green mucus as well  -3/23:  ptn is in good spirits, says he vomited "spit" this am, but tolerated all meals without vomiting. no abd pain, no undigested food in the vomit. afebrile, no diarrhea, RVP neg. GI consulted.   -3/21-22: ptn feels well.  pain is controlled. still no accepting facility for [facility]  -3/20: ptn states he has severe low back and LLE pain though overall is improving.   -3/19: ptn w tan crusting around SPC , states its painful. started on po Bactrim, renally dosed by ID for cellulitis.   -3/18: LLE paraesthesias are chronic, will d/w CM re dc planning to [facility]    -3/17: ptn states he is lethargic, he has LLE numbness which is new and severe pain at SPC insertion site. this was ID, d/w neuro, renal and vascular. as per ID: no sign of an acute infection recommend CT A/P.   -3/16:  urine cx from 3/15 NTD, zosyn DCed, awaiting dc to [facility], not sure will be able to go to [facility] since there is an insurance coverage conflict. HD as per renal  - 3/15: spoke w ptn's daughter today re denial from [place] for [facility]. ptn has [employer] medicare advantage plan, which [facility] doesnt accept. i recommended to speak  to Cm and to the insurance company to find participating facilities. ptn is stable today. pain and erythema at SP catheter site has resolved today. seen by ID, will cont ZOSYN for now. UCx sent.   - 3/14: suprapubic tube changhed by BETTY, ptn has crusting at the insertion site and pain. will start Abx, urine always has sediment, will sent for cx, start Vanco/ZOsyn. ID consult called. check MRSA/MSSA PCR  -3/13: ptn is doing well. ptn has an accepting rehab facility, now pending AUTH.   -3/12: ptn is no longer constipated. doing well off prn IV DIlaudid. awaiting dc to [facility]  -3/11: ptn has no new c/o other than feeling constipated, lactulose ordered. also in preparation for [facility] will dc prn IV DIlaudid, cont prn OXy IR  -3/10:  ptn is awake, alert, wife at bedside, i instructed them to give rehab choices. also awaiting SPC change to be done by urology. pain is controlled  -3/9: seen by PT, will refer to [facility]. choices to be given in AM. this was d/w ptn, daughter, wife.   -3/8:  ptn didnt want to get PT eval done, will reorder. ptn needs [facility] placement. PMNR consult ordered  - 3/7: ptn is alert , pain is controlled, DC planning d/w ptn and HCP, daughter Yanique  -3/6:  ptn is awake, alert, ecchymotic feet look improved, pain is controlled. DC planning to [facility]  3/4-5 - s/p b/l iliac arteries angioplasty and stent placements on 3/3. today has new ecchymoses in b/l LE, concern for arterial insufficiency. vascular aware.. wound from Left knee immobilizer is dressed and healing. pain is controlled.   LEFT UE AVF placement/scheduling will be on outptn basis as per vascular. dc planning to [facility]  - 3/3: ptn is s/p angiogram RLE : b/l iliac arteries w successful angioplasty and stents. in PACU. awaitng HD.  ptn has a pressure wound from the LLE immobilizer posteriorly proximal to the knee. its been on 2/2 knee fracture, applied by ortho and managed by ptn's wife as per ptn's and wife's request , this was d/w nursing and nurse manager ms Ruiz.. immobilizer should be managed by orthopedics and nursing. will keep it off, only keep on when repositioning for care and then remove. wound care to F/U . this was discussed at length w daughter Yanique and wife Dee Dee.   -3/1-3/2: ptn is smiling, pain free, had HD 2/28 and 3/1, awaiting RLE angiogram 3/3, on Heparin drip, euvolemic  -2/28: ptn is lethargic, awaiting RLE angiogram possible fem-fem bypass hopefully on 3/3 pending OR availability, awaiting HD today    -2/27:  plan for angiogram in am with possible angioplasty, possible stent, possible fem-fem bypass. medically cleared for angiogram and possible surgery, stent, angioplasty. pain is controlled. remains on heparin drip as per vascular. HD as per renal    -2/26:  ptn is sleeping, pain is controlled, he was seen by wound care and vascular attending. planning on angiogram 2/2 severe PAD in LE on CTA. he also spoke to HCP. ptn and HCP in agreement. ptn was started on HEPARIN drip as per vascular recs. RIJ permacath done 2/25    - 2/25: ptn states he is hallucinating, but not at present, his MS is at baseline, his pain is controlled, he still needs prn pain meds when he is moved in the bed or for testing or for HD. awaiting Permacath, AVF creation, LE bypass to be d/w Dr. Swenson and the ptn tomorrow. ptn has cardiology clearance  if he opts for. Ptn has sacral decubiti and posterior thigh and buttock decibiti and b/l heel decubiti. Z flow bootie d/w RN, get wound care consult    -2/24: pain is controlled  if the LLE is immobile and fully extended. doesn't tolerate the knee immobilizer. has a medium condyle and acute on chronic patella fx in LEFT knee. as per vascular ptn will need iliac stent  w a fem-fem bypass, possible axillo-femoral bypass. for this surgery he would need cardiac work up . more pressing surgery is LUE AVF creation,  in IR will arrange for Permacath. for pain control: Motrin 400 mg q12H, Oxy ER 30 mg q12H, Oxy IR 10 for mod pain and dilaudid 2 mg iv for severe pain. still has pruritis though improved, will raise atarax 25 mg to tid. plan of care d/w daughter Norma Persaud , ptn and his wife. Also d/w renal, card, vascular, ACP    -2/23: Daughter Yanique is the HCP, she and the ptn filled out the paperwork. daily plan and findings d/w her and the ptn. MS is at abseline, c/o b/l LE foot pain and Left Knee pain. xrays of left knee: cannot r/o acute on chronic inferior pole patellar Fx. knee immobilizer is on, awaiting ortho consult. doubt needs any intervention awaiting Ct chest today, will add CT Left knee. ptn states itching has recurred, severe pain has recurred. will resume Atatrax ( 25 mg bid) and Oxycodone ER , but at a lower dose 15 mg q12H. cont prn analgesics. HD as per renal, awaiting Vascular consult f/u re CTA A/L &LE and recs. ptn also wants AVF surgery on this admission. Coughing has stopped    - 2/22: ptn is drowsy but arousable, calmer today, answers questions appropriately. he is pain free, he stopped coughing, he denies having pruritis: will DC:  OXY ER, Atarax, Tessalon perles.     -  2/21: ptn is tearful, a bit confused, coughing, recognizes me and family at bedside. GOC d/w daughter and wife. AMS prob delirium 2/2 acute illness +/- opiods, lyrica, atarac. will lower atarak to tid, dc lyrica, cont Oxy 2/2 ptn has severe LE pain. neuro called for eval. Head ct done yesterday w no acute findings. CTA A/P w LE run fof: severe PAD, vascular to follow up on plan fo care. plan for HD tomorrow and next week place on tiw schedule of MWF. will need tunneled catheter prior to DC and will d/w vascular scheduling of AVF. ptn wants all to be done while inptn. daughter wants to be HCP as per ptn's wishes. she was given paperwork to get it signed and witnessed and will present to nursing thereafter. details of findings and complaints and plan of care d/w daughter and wife. spent 60 min. RVP ordered. start mucinex , tessalon perles, duonebs, get CT chest to r/o PNA. pulm called    -  2/20:  ptn had RRT 2/2 AMS and tremors. no SZ, no LOC. noted to have Na 123( hyponatremia), given 500 cc NS. Gabapentin DCed. ptn had been taking gabapentin at home PTA. Pain is controlled. Pruritis resolved, tolerating HD otherwise.     - Pain management:  cont Oxycodone 10 IR q6H,  DIlaudid prn severe pain 2 mg q4H prn.   - cont outptn meds  - DVT ppx w HSC

## 2025-04-10 NOTE — PROGRESS NOTE ADULT - ASSESSMENT
74 yo M w/ PMHx of multiple comorbidities including polio, chronic pain with multiple sequela including LE weakness requiring wheelchair attributed to polio since 18 months of age, scoliosis, neurogenic bladder s/p suprapubic catheter, and colostomy s/p iatrogenic rectal rupture injury, reversal of colostomy, anemia, erectile dysfunction, multiple fractures including patella and femoral condyle with gianluca placement of the lower extremities, deep venous thrombosis, elevated hemidiaphragm, hyperlipidemia, bacterial sepsis, electrolyte imbalance, shoulder surgery, cataract surgery, tremors, PAD who initially presented to ED after fall from wheelchair found to have ESRD now on HD w progressive wounds. Surgery consulted for evaluation of sacral wound. S/p sacral ulcer and thigh wounds debridement on 4/8.    PLAN:  - Will re-attempt dressing change again today with wound care team with pain medication prior to dressing change    Trauma -[id number]

## 2025-04-10 NOTE — CHART NOTE - NSCHARTNOTEFT_GEN_A_CORE
NUTRITION FOLLOW UP NOTE    PATIENT SEEN FOR: malnutrition follow up     SOURCE: [x] Patient  [x] Current Medical Record  [x] RN  [x] Family/support person at bedside  [] Patient unavailable/inappropriate  [] Other:    CHART REVIEWED/EVENTS NOTED.  [] No changes to nutrition care plan to note  [x] Nutrition Status:  - chief complaint of ESRD requiring HD, uremia per chart   - s/p debridement     DIET ORDER:   Diet, Regular:   1000mL Fluid Restriction ([id number])  Low Sodium  Liquid Protein Supplement     Qty per Day:  2  Supplement Feeding Modality:  Oral  Nepro Cans or Servings Per Day:  2       Frequency:  Daily (04-08-25)      CURRENT DIET ORDER IS:  [] Appropriate:  [] Inadequate:  [x] Other: see recommendations below     NUTRITION INTAKE/PROVISION:  [x] PO: poor intake of breakfast, recent vomiting reported at visit. RN made aware. Denied issues chewing/swallowing. 0-100% PO intake per flowsheets    [] Enteral Nutrition:  [] Parenteral Nutrition:    ANTHROPOMETRICS:  Drug Dosing Weight  Height (cm): 172.7 (08 Apr 2025 15:25)  Weight (kg): 90.7 (08 Apr 2025 15:25)  BMI (kg/m2): 30.4 (08 Apr 2025 15:25)  BSA (m2): 2.04 (08 Apr 2025 15:25)  Weights: ****  weight variance noted. Weight variance possibly due to fluid shifts with edema present per flowsheets and ESRD on HD. RD will continue to monitor weight trends as able/available.       MEDICATIONS:  MEDICATIONS  (STANDING):  aspirin  chewable 81 milliGRAM(s) Oral daily  atorvastatin 40 milliGRAM(s) Oral at bedtime  bacitracin/polymyxin B Ointment 1 Application(s) Topical two times a day  chlorhexidine 2% Cloths 1 Application(s) Topical daily  clopidogrel Tablet 75 milliGRAM(s) Oral daily  Dakins Solution - 1/4 Strength 1 Application(s) Topical every 8 hours  epoetin aleah-epbx (RETACRIT) Injectable 37381 Unit(s) IV Push <User Schedule>  gabapentin 300 milliGRAM(s) Oral at bedtime  heparin   Injectable 5000 Unit(s) SubCutaneous every 8 hours  hydrOXYzine hydrochloride 25 milliGRAM(s) Oral three times a day  ibuprofen  Tablet. 400 milliGRAM(s) Oral every 12 hours  lactobacillus acidophilus 1 Tablet(s) Oral two times a day with meals  meropenem  IVPB 500 milliGRAM(s) IV Intermittent every 24 hours  methocarbamol 750 milliGRAM(s) Oral every 8 hours  midodrine. 10 milliGRAM(s) Oral <User Schedule>  mupirocin 2% Ointment 1 Application(s) Topical <User Schedule>  Nephro-margaret 1 Tablet(s) Oral daily  oxyCODONE  ER Tablet 20 milliGRAM(s) Oral every 8 hours  pantoprazole    Tablet 40 milliGRAM(s) Oral before breakfast  polyethylene glycol 3350 17 Gram(s) Oral daily  povidone iodine 10% Solution 1 Application(s) Topical two times a day  senna 2 Tablet(s) Oral at bedtime    MEDICATIONS  (PRN):  benzocaine/menthol Lozenge 1 Lozenge Oral three times a day PRN Sore Throat  bisacodyl 5 milliGRAM(s) Oral every 12 hours PRN Constipation  HYDROmorphone  Injectable 1 milliGRAM(s) IV Push every 6 hours PRN severe breakthrough pain  oxyCODONE    IR 10 milliGRAM(s) Oral every 4 hours PRN Severe Pain (7 - 10)  sodium chloride 0.9% lock flush 10 milliLiter(s) IV Push every 1 hour PRN Pre/post blood products, medications, blood draw, and to maintain line patency      NUTRITIONALLY PERTINENT LABS:  04-10 Na136 mmol/L Glu 64 mg/dL[L] K+ 3.9 mmol/L Cr  2.92 mg/dL[H] BUN 24 mg/dL[H]    A1C with Estimated Average Glucose Result: 5.7 % (12-21-24 @ 06:44)      NUTRITIONALLY PERTINENT MEDICATIONS/LABS:  [x] Reviewed  [x] Relevant notes on medications/labs:  - low sodium and glucose 4/9  - antibiotics, probiotics ordered   - nephrovite ordered     EDEMA:  [x] Reviewed  [] Relevant notes:    GI/ I&O:  [x] Reviewed  [] Relevant notes:  [x] Other: Pt reported vomiting at visit, RN made aware. Denied constipation /diarrhea     SKIN:   [] No pressure injuries documented, per nursing flowsheet  [x] Pressure injury previously noted  [] Change in pressure injury documentation:  [] Other:    ESTIMATED NEEDS:  [x] No change:  [] Updated:  Energy: 6283-3901 kcal/day (30-35 kcal/kg)  Protein: 83.8-97.7 g/day (1.2-1.4 g/kg)  Fluid:   ml/day or [x] defer to team  Based on: IBW 69.8 kg    NUTRITION DIAGNOSIS:  [x] Prior Dx: 1) moderate chronic malnutrition and 2) increased nutrient needs  [] New Dx:    EDUCATION:  [] Yes:  [x] Not appropriate/warranted at time of visit. Pt/family made aware RD remains available and they expressed understanding.     NUTRITION CARE PLAN:  1. Diet: Continue Low Sodium therapeutic diet restriction; additional dietary restrictions not recommended to promote PO intake at this time. Defer fluid restriction to medical team discretion   2. Supplements: Continue Nepro oral supplement BID to provide 420 kcal, 19 grams protein per 8 oz. Continue LPS BID to provide 100 kcal, 15 grams protein per serving,  - RD will continue to provide protein fortified smoothie as available to promote PO intake   3. Multivitamin/mineral supplementation: Continue Nephrovite to promote wound healing, pending no medical contraindications   4. Monitor PO intake, PO diet tolerance, skin, weight, nutrition related labs, GI function, goals of care     [] Achieved - Continue current nutrition intervention(s)  [] Current medical condition precludes nutrition intervention at this time.    MONITORING AND EVALUATION:   RD remains available upon request and will follow up per protocol.    Merlene Padilla MS, RDN, CDN; available on Teams NUTRITION FOLLOW UP NOTE    PATIENT SEEN FOR: malnutrition follow up     SOURCE: [x] Patient  [x] Current Medical Record  [x] RN  [x] Family/support person at bedside  [] Patient unavailable/inappropriate  [] Other:    CHART REVIEWED/EVENTS NOTED.  [] No changes to nutrition care plan to note  [x] Nutrition Status:  - chief complaint of ESRD requiring HD, uremia per chart   - s/p OR for sacral and thigh wound debridement   - PNA; Suspect aspiration related s/p episode of vomiting  - noted palliative assessments     DIET ORDER:   Diet, Regular:   1000mL Fluid Restriction ([id number])  Low Sodium  Liquid Protein Supplement     Qty per Day:  2  Supplement Feeding Modality:  Oral  Nepro Cans or Servings Per Day:  2       Frequency:  Daily (04-08-25)      CURRENT DIET ORDER IS:  [] Appropriate:  [] Inadequate:  [x] Other: see recommendations below     NUTRITION INTAKE/PROVISION:  [x] PO: poor intake of breakfast, recent vomiting reported at visit. RN made aware. Denied issues chewing/swallowing. 0-100% PO intake per flowsheets    [] Enteral Nutrition:  [] Parenteral Nutrition:    ANTHROPOMETRICS:  Drug Dosing Weight  Height (cm): 172.7 (08 Apr 2025 15:25)  Weight (kg): 90.7 (08 Apr 2025 15:25)  BMI (kg/m2): 30.4 (08 Apr 2025 15:25)  BSA (m2): 2.04 (08 Apr 2025 15:25)  Weights: 96 kg, 97.3 kg (03/31); 95.8 kg, 97.3 kg (03/28), 88.5 kg, 89.5 kg (03/26)   weight variance noted. Weight variance possibly due to fluid shifts with edema present per flowsheets and ESRD on HD. RD will continue to monitor weight trends as able/available.       MEDICATIONS:  MEDICATIONS  (STANDING):  aspirin  chewable 81 milliGRAM(s) Oral daily  atorvastatin 40 milliGRAM(s) Oral at bedtime  bacitracin/polymyxin B Ointment 1 Application(s) Topical two times a day  chlorhexidine 2% Cloths 1 Application(s) Topical daily  clopidogrel Tablet 75 milliGRAM(s) Oral daily  Dakins Solution - 1/4 Strength 1 Application(s) Topical every 8 hours  epoetin aleah-epbx (RETACRIT) Injectable 35886 Unit(s) IV Push <User Schedule>  gabapentin 300 milliGRAM(s) Oral at bedtime  heparin   Injectable 5000 Unit(s) SubCutaneous every 8 hours  hydrOXYzine hydrochloride 25 milliGRAM(s) Oral three times a day  ibuprofen  Tablet. 400 milliGRAM(s) Oral every 12 hours  lactobacillus acidophilus 1 Tablet(s) Oral two times a day with meals  meropenem  IVPB 500 milliGRAM(s) IV Intermittent every 24 hours  methocarbamol 750 milliGRAM(s) Oral every 8 hours  midodrine. 10 milliGRAM(s) Oral <User Schedule>  mupirocin 2% Ointment 1 Application(s) Topical <User Schedule>  Nephro-margaret 1 Tablet(s) Oral daily  oxyCODONE  ER Tablet 20 milliGRAM(s) Oral every 8 hours  pantoprazole    Tablet 40 milliGRAM(s) Oral before breakfast  polyethylene glycol 3350 17 Gram(s) Oral daily  povidone iodine 10% Solution 1 Application(s) Topical two times a day  senna 2 Tablet(s) Oral at bedtime    MEDICATIONS  (PRN):  benzocaine/menthol Lozenge 1 Lozenge Oral three times a day PRN Sore Throat  bisacodyl 5 milliGRAM(s) Oral every 12 hours PRN Constipation  HYDROmorphone  Injectable 1 milliGRAM(s) IV Push every 6 hours PRN severe breakthrough pain  oxyCODONE    IR 10 milliGRAM(s) Oral every 4 hours PRN Severe Pain (7 - 10)  sodium chloride 0.9% lock flush 10 milliLiter(s) IV Push every 1 hour PRN Pre/post blood products, medications, blood draw, and to maintain line patency      NUTRITIONALLY PERTINENT LABS:  04-10 Na136 mmol/L Glu 64 mg/dL[L] K+ 3.9 mmol/L Cr  2.92 mg/dL[H] BUN 24 mg/dL[H]    A1C with Estimated Average Glucose Result: 5.7 % (12-21-24 @ 06:44)      NUTRITIONALLY PERTINENT MEDICATIONS/LABS:  [x] Reviewed  [x] Relevant notes on medications/labs:  - low sodium and glucose 4/9  - antibiotics, probiotics ordered   - nephrovite ordered     EDEMA:  [x] Reviewed  [x] Relevant notes: 1+ edema left/right feet/hands     GI/ I&O:  [x] Reviewed  [] Relevant notes:  [x] Other: Pt reported vomiting at visit, RN made aware. Denied constipation /diarrhea. bowel movement recorded 4/6 per flowsheets     SKIN:   [] No pressure injuries documented, per nursing flowsheet  [x] Pressure injury previously noted  [x] Change in pressure injury documentation:  [x] Other: noted multiple vascular/arterial and non pressure wounds per flowsheets (see flowsheets for details/locations)   - per wound care note 3/31: "Wound Consult requested to assist w/ management of:  Sacral, Rt Ischial, and Left Buttock wounds  BLE severe PAD w/ extensive wounds"    ESTIMATED NEEDS:  [x] No change:  [] Updated:  Energy: 6738-3438 kcal/day (30-35 kcal/kg)  Protein: 83.8-97.7 g/day (1.2-1.4 g/kg)  Fluid:   ml/day or [x] defer to team  Based on: IBW 69.8 kg    NUTRITION DIAGNOSIS:  [x] Prior Dx: 1) moderate chronic malnutrition and 2) increased nutrient needs  [] New Dx:    EDUCATION:  [] Yes:  [x] Not appropriate/warranted at time of visit. Pt/family made aware RD remains available and they expressed understanding.     NUTRITION CARE PLAN:  1. Diet: Continue Low Sodium therapeutic diet restriction; additional dietary restrictions not recommended to promote PO intake at this time. Defer fluid restriction to medical team discretion   2. Supplements: Continue Nepro oral supplement BID to provide 420 kcal, 19 grams protein per 8 oz. Continue LPS BID to provide 100 kcal, 15 grams protein per serving,  - RD will continue to provide protein fortified smoothie as available to promote PO intake   3. Multivitamin/mineral supplementation: Continue Nephrovite to promote wound healing, pending no medical contraindications   4. Monitor PO intake, PO diet tolerance, skin, weight, nutrition related labs, GI function, goals of care     [] Achieved - Continue current nutrition intervention(s)  [] Current medical condition precludes nutrition intervention at this time.    MONITORING AND EVALUATION:   RD remains available upon request and will follow up per protocol.    Merlene Padilla MS, RDN, CDN; available on Teams

## 2025-04-10 NOTE — PROGRESS NOTE ADULT - SUBJECTIVE AND OBJECTIVE BOX
Patient is a 75y old  Male who presents with a chief complaint of ESRD requiring HD, uremia (10 Apr 2025 14:12)      SUBJECTIVE / OVERNIGHT EVENTS: pain is controlled, on Hemal for P. aeruginosa growing in sacral decub debridement Cx.ptn is DNR/DNI, was seen by palliaitive care. being followed by pain for pain management in LE and 2/2 sacral decubiti.  awaiting dressing change by surgical team.     MEDICATIONS  (STANDING):  aspirin  chewable 81 milliGRAM(s) Oral daily  atorvastatin 40 milliGRAM(s) Oral at bedtime  bacitracin/polymyxin B Ointment 1 Application(s) Topical two times a day  chlorhexidine 2% Cloths 1 Application(s) Topical daily  clopidogrel Tablet 75 milliGRAM(s) Oral daily  Dakins Solution - 1/4 Strength 1 Application(s) Topical every 8 hours  epoetin aleah-epbx (RETACRIT) Injectable 40326 Unit(s) IV Push <User Schedule>  gabapentin 300 milliGRAM(s) Oral at bedtime  heparin   Injectable 5000 Unit(s) SubCutaneous every 8 hours  hydrOXYzine hydrochloride 25 milliGRAM(s) Oral three times a day  ibuprofen  Tablet. 400 milliGRAM(s) Oral every 12 hours  lactobacillus acidophilus 1 Tablet(s) Oral two times a day with meals  meropenem  IVPB 500 milliGRAM(s) IV Intermittent every 24 hours  methocarbamol 750 milliGRAM(s) Oral every 8 hours  midodrine. 10 milliGRAM(s) Oral <User Schedule>  mupirocin 2% Ointment 1 Application(s) Topical <User Schedule>  Nephro-margaret 1 Tablet(s) Oral daily  oxyCODONE  ER Tablet 20 milliGRAM(s) Oral every 8 hours  pantoprazole    Tablet 40 milliGRAM(s) Oral before breakfast  polyethylene glycol 3350 17 Gram(s) Oral daily  povidone iodine 10% Solution 1 Application(s) Topical two times a day  senna 2 Tablet(s) Oral at bedtime    MEDICATIONS  (PRN):  benzocaine/menthol Lozenge 1 Lozenge Oral three times a day PRN Sore Throat  bisacodyl 5 milliGRAM(s) Oral every 12 hours PRN Constipation  HYDROmorphone  Injectable 1 milliGRAM(s) IV Push every 6 hours PRN severe breakthrough pain  oxyCODONE    IR 10 milliGRAM(s) Oral every 4 hours PRN Severe Pain (7 - 10)  sodium chloride 0.9% lock flush 10 milliLiter(s) IV Push every 1 hour PRN Pre/post blood products, medications, blood draw, and to maintain line patency      Vital Signs Last 24 Hrs  T(F): 97.7 (04-10-25 @ 10:58), Max: 98.1 (04-10-25 @ 00:27)  HR: 77 (04-10-25 @ 12:47) (77 - 85)  BP: 103/46 (04-10-25 @ 12:47) (87/50 - 114/70)  RR: 18 (04-10-25 @ 12:47) (18 - 18)  SpO2: 95% (04-10-25 @ 12:47) (94% - 95%)  Telemetry:   CAPILLARY BLOOD GLUCOSE        I&O's Summary    09 Apr 2025 07:01  -  10 Apr 2025 07:00  --------------------------------------------------------  IN: 0 mL / OUT: 200 mL / NET: -200 mL    10 Apr 2025 07:01  -  10 Apr 2025 15:02  --------------------------------------------------------  IN: 240 mL / OUT: 0 mL / NET: 240 mL        PHYSICAL EXAM:  GENERAL: NAD, well-developed  HEAD:  Atraumatic, Normocephalic  EYES: EOMI, PERRLA, conjunctiva and sclera clear  NECK: Supple, No JVD  CHEST/LUNG: Clear to auscultation bilaterally; No wheeze  HEART: Regular rate and rhythm; No murmurs, rubs, or gallops  ABDOMEN: Soft, Nontender, Nondistended; Bowel sounds present  EXTREMITIES:  2+ Peripheral Pulses, No clubbing, cyanosis, or edema  PSYCH: AAOx3  NEUROLOGY: non-focal  SKIN: No rashes or lesions    LABS:                        9.7    8.84  )-----------( 278      ( 10 Apr 2025 09:30 )             32.8     04-10    136  |  99  |  24[H]  ----------------------------<  64[L]  3.9   |  25  |  2.92[H]    Ca    7.2[L]      10 Apr 2025 09:30

## 2025-04-11 NOTE — PROGRESS NOTE ADULT - SUBJECTIVE AND OBJECTIVE BOX
Patient/wife share that patient has been unable to receive prn opiates at times due to hypotension      VITAL:  T(C): , Max: 36.7 (04-11-25 @ 00:00)  T(F): , Max: 98.1 (04-11-25 @ 00:00)  HR: 87 (04-11-25 @ 00:00)  BP: 109/63 (04-11-25 @ 05:03)  BP(mean): --  RR: 18 (04-11-25 @ 00:00)  SpO2: 97% (04-11-25 @ 00:00)      PHYSICAL EXAM:  Constitutional: lethargic but alert, NAD  HEENT: NCAT, DMM  Neck: Supple, No JVD  Respiratory: CTA-b/l  Cardiovascular: RRR s1s2, no m/r/g  Gastrointestinal: BS+, soft, NT/ND  : (+)suprapubic cath  Extremities: 1+ b/l LE edema  Neurological: reduced generalized strength  Back: no CVAT b/l  Skin: gangrenous changes b/l feet  Access: RIJ tunneled cath-accessed    LABS:                        9.7    8.84  )-----------( 278      ( 10 Apr 2025 09:30 )             32.8     Na(136)/K(3.9)/Cl(99)/HCO3(25)/BUN(24)/Cr(2.92)Glu(64)/Ca(7.2)/Mg(--)/PO4(--)    04-10 @ 09:30  Na(134)/K(4.5)/Cl(96)/HCO3(22)/BUN(43)/Cr(3.72)Glu(56)/Ca(7.2)/Mg(--)/PO4(--)    04-09 @ 09:41      IMPRESSION: 74M w/ polio, neurogenic bladder/suprapubic catheter, 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 Due for HD today    (2)Hyponatremia - controlled with high-Na+ bath with HD    (3)Anemia - s/p IV iron; on Retacrit with HD    (4)PAD - s/p LE bypass 3/3/25     (5)CV - tenuous hemodynamics of late to point where he is not able to get opiates at times - best that we cut back on the UF    (6)Pain - in association with sacral ulceration, PAD    RECOMMEND:   (1)HD today - no net UF   (2)Midodrine 10mg po TIW, pre HD                  Rafita Denny MD  [facility]  Office/on call physician: [phone]  Cell (7a-7y): [phone]       Patient/wife share that patient has been unable to receive prn opiates at times due to hypotension  Seen on HD    VITAL:  T(C): , Max: 36.7 (04-11-25 @ 00:00)  T(F): , Max: 98.1 (04-11-25 @ 00:00)  HR: 87 (04-11-25 @ 00:00)  BP: 109/63 (04-11-25 @ 05:03)  BP(mean): --  RR: 18 (04-11-25 @ 00:00)  SpO2: 97% (04-11-25 @ 00:00)      PHYSICAL EXAM:  Constitutional: lethargic but alert, NAD  HEENT: NCAT, DMM  Neck: Supple, No JVD  Respiratory: CTA-b/l  Cardiovascular: RRR s1s2, no m/r/g  Gastrointestinal: BS+, soft, NT/ND  : (+)suprapubic cath  Extremities: 1+ b/l LE edema  Neurological: reduced generalized strength  Back: no CVAT b/l  Skin: gangrenous changes b/l feet  Access: RIJ tunneled cath-accessed    LABS:                        9.7    8.84  )-----------( 278      ( 10 Apr 2025 09:30 )             32.8     Na(136)/K(3.9)/Cl(99)/HCO3(25)/BUN(24)/Cr(2.92)Glu(64)/Ca(7.2)/Mg(--)/PO4(--)    04-10 @ 09:30  Na(134)/K(4.5)/Cl(96)/HCO3(22)/BUN(43)/Cr(3.72)Glu(56)/Ca(7.2)/Mg(--)/PO4(--)    04-09 @ 09:41      IMPRESSION: 74M w/ polio, neurogenic bladder/suprapubic catheter, 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 On HD now    (2)Hyponatremia - controlled with high-Na+ bath with HD    (3)Anemia - s/p IV iron; on Retacrit with HD    (4)PAD - s/p LE bypass 3/3/25     (5)CV - tenuous hemodynamics of late to point where he is not able to get opiates at times - best that we cut back on the UF    (6)Pain - in association with sacral ulceration, PAD    RECOMMEND:   (1)HD today - no net UF   (2)Midodrine 10mg po TIW, pre HD                  Rafita Denny MD  [facility]  Office/on call physician: [phone]  Cell (7a-7p): [phone]       Patient/wife share that patient has been unable to receive prn opiates at times due to hypotension  Seen on HD    VITAL:  T(C): , Max: 36.7 (04-11-25 @ 00:00)  T(F): , Max: 98.1 (04-11-25 @ 00:00)  HR: 87 (04-11-25 @ 00:00)  BP: 109/63 (04-11-25 @ 05:03)  BP(mean): --  RR: 18 (04-11-25 @ 00:00)  SpO2: 97% (04-11-25 @ 00:00)      PHYSICAL EXAM:  Constitutional: alert, NAD  HEENT: NCAT, DMM  Neck: Supple, No JVD  Respiratory: CTA-b/l  Cardiovascular: RRR s1s2, no m/r/g  Gastrointestinal: BS+, soft, NT/ND  : (+)suprapubic cath  Extremities: 1+ b/l LE edema  Neurological: reduced generalized strength  Back: no CVAT b/l  Skin: gangrenous changes b/l feet  Access: RIJ tunneled cath-accessed    LABS:                        9.7    8.84  )-----------( 278      ( 10 Apr 2025 09:30 )             32.8     Na(136)/K(3.9)/Cl(99)/HCO3(25)/BUN(24)/Cr(2.92)Glu(64)/Ca(7.2)/Mg(--)/PO4(--)    04-10 @ 09:30  Na(134)/K(4.5)/Cl(96)/HCO3(22)/BUN(43)/Cr(3.72)Glu(56)/Ca(7.2)/Mg(--)/PO4(--)    04-09 @ 09:41      IMPRESSION: 74M w/ polio, neurogenic bladder/suprapubic catheter, 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 On HD now    (2)Hyponatremia - controlled with high-Na+ bath with HD    (3)Anemia - s/p IV iron; on Retacrit with HD    (4)PAD - s/p LE bypass 3/3/25     (5)CV - tenuous hemodynamics of late to point where he is not able to get opiates at times - best that we cut back on the UF    (6)Pain - in association with sacral ulceration, PAD    RECOMMEND:   (1)HD today - no net UF   (2)Midodrine 10mg po TIW, pre HD                  Rafita Denny MD  [facility]  Office/on call physician: [phone]  Cell (7a-7p): [phone]

## 2025-04-11 NOTE — PROGRESS NOTE ADULT - SUBJECTIVE AND OBJECTIVE BOX
Neurology      S; patient seen. no neuro changes      Medications: MEDICATIONS  (STANDING):  aspirin  chewable 81 milliGRAM(s) Oral daily  atorvastatin 40 milliGRAM(s) Oral at bedtime  bacitracin/polymyxin B Ointment 1 Application(s) Topical two times a day  chlorhexidine 2% Cloths 1 Application(s) Topical daily  clopidogrel Tablet 75 milliGRAM(s) Oral daily  Dakins Solution - 1/4 Strength 1 Application(s) Topical every 8 hours  epoetin aleah-epbx (RETACRIT) Injectable 63090 Unit(s) IV Push <User Schedule>  gabapentin 300 milliGRAM(s) Oral at bedtime  heparin   Injectable 5000 Unit(s) SubCutaneous every 8 hours  hydrOXYzine hydrochloride 25 milliGRAM(s) Oral three times a day  ibuprofen  Tablet. 400 milliGRAM(s) Oral every 12 hours  lactobacillus acidophilus 1 Tablet(s) Oral two times a day with meals  meropenem  IVPB 500 milliGRAM(s) IV Intermittent every 24 hours  methocarbamol 750 milliGRAM(s) Oral every 8 hours  midodrine. 10 milliGRAM(s) Oral <User Schedule>  mupirocin 2% Ointment 1 Application(s) Topical <User Schedule>  Nephro-margaret 1 Tablet(s) Oral daily  oxyCODONE  ER Tablet 20 milliGRAM(s) Oral every 8 hours  pantoprazole    Tablet 40 milliGRAM(s) Oral before breakfast  polyethylene glycol 3350 17 Gram(s) Oral daily  povidone iodine 10% Solution 1 Application(s) Topical two times a day  senna 2 Tablet(s) Oral at bedtime    MEDICATIONS  (PRN):  benzocaine/menthol Lozenge 1 Lozenge Oral three times a day PRN Sore Throat  bisacodyl 5 milliGRAM(s) Oral every 12 hours PRN Constipation  HYDROmorphone  Injectable 1 milliGRAM(s) IV Push every 6 hours PRN severe breakthrough pain  oxyCODONE    IR 10 milliGRAM(s) Oral every 4 hours PRN Severe Pain (7 - 10)  sodium chloride 0.9% lock flush 10 milliLiter(s) IV Push every 1 hour PRN Pre/post blood products, medications, blood draw, and to maintain line patency       Vitals:  Vital Signs Last 24 Hrs  T(C): 36.2 (11 Apr 2025 07:28), Max: 36.7 (11 Apr 2025 00:00)  T(F): 97.2 (11 Apr 2025 07:28), Max: 98.1 (11 Apr 2025 00:00)  HR: 71 (11 Apr 2025 07:28) (71 - 87)  BP: 99/48 (11 Apr 2025 07:28) (91/44 - 109/63)  BP(mean): --  RR: 17 (11 Apr 2025 07:28) (17 - 18)  SpO2: 92% (11 Apr 2025 07:28) (92% - 97%)    Parameters below as of 11 Apr 2025 07:28  Patient On (Oxygen Delivery Method): room air                  General Appearance: Appropriately dressed and in no acute distress       Head: Normocephalic, atraumatic and no dysmorphic features  Ear, Nose, and Throat: Moist mucous membranes  CVS: S1S2+  Resp: No SOB, no wheeze or rhonchi  GI: soft NT/ND  Extremities: LE PVD : dusky toes noted bilaterally L>R   Skin: + decub      Neurological Exam:  Mental Status: Awake, alert and oriented x 2-3.  Able to follow simple and complex verbal commands. Able to name and repeat. fluent speech. No obvious aphasia or dysarthria noted.   Cranial Nerves: PERRL, EOMI, VFFC, sensation V1-V3 intact,  no obvious facial asymmetry, equal elevation of palate, scm/trap 5/5, tongue is midline on protrusion. no obvious papilledema on fundoscopic exam. hearing is grossly intact.   Motor: Normal bulk, tone and strength throughout uppers 4/ lowers 0-/1 5   Sensation: Intact to light touch and pinprick throughout.     Coordination: No dysmetria on FNF   Gait: deferred, wheelchair bound     Data/Labs/Imaging which I personally reviewed.           LABS:                          9.5    11.15 )-----------( 325      ( 11 Apr 2025 09:03 )             31.3     04-11    132[L]  |  96  |  29[H]  ----------------------------<  83  4.0   |  24  |  3.43[H]    Ca    7.3[L]      11 Apr 2025 09:03          Urinalysis Basic - ( 11 Apr 2025 09:03 )    Color: x / Appearance: x / SG: x / pH: x  Gluc: 83 mg/dL / Ketone: x  / Bili: x / Urobili: x   Blood: x / Protein: x / Nitrite: x   Leuk Esterase: x / RBC: x / WBC x   Sq Epi: x / Non Sq Epi: x / Bacteria: x                < from: CT Head No Cont (02.20.25 @ 18:47) >    ACC: [id number] EXAM:  CT BRAIN   ORDERED BY: GUY DE LA CRUZ     PROCEDURE DATE:  02/20/2025          INTERPRETATION:  CLINICAL INDICATION: Altered mental status    TECHNIQUE: Axial CT scanning of the brain was obtained from the skull   base to the vertex without the administration of intravenous contrast.   Reformatted coronal and sagittal images were subsequently obtained and   reviewed.    COMPARISON: None    FINDINGS:  There is no CT evidence of acute transcortical infarct. Age-related   involutional changes and chronic microvascular ischemic changes.    There is no hydrocephalus, mass effect, or acute intracranial hemorrhage.   No extra-axial collection. Basal cisterns are patent.    The visualized paranasal sinuses and mastoid air cells are clear.    The calvarium is intact.    IMPRESSION:  No evidence of acute transcortical infarct, acute intracranial   hemorrhage, or mass effect.    --- End of Report ---            CORTNEY REARDON MD; Attending Radiologist  This document has been electronically signed. Feb 20 2025  7:07PM    < end of copied text >

## 2025-04-11 NOTE — PROGRESS NOTE ADULT - SUBJECTIVE AND OBJECTIVE BOX
[facility]  SABINO Griffin Y. Patel, S. Shah, G. Casimir  [phone]  ([phone] - weekdays after 5pm and weekends)    Name: BRIAN DEMPSEY  Age/Gender: 75y Male  MRN: [id number]    Interval History:  Patient seen and examined this morning at HD.   No new complaints noted.  Notes reviewed  No concerning overnight events  Afebrile   Allergies: No Known Allergies      Objective:  Vitals:   T(F): 97.3 (04-11-25 @ 10:28), Max: 98.1 (04-11-25 @ 00:00)  HR: 65 (04-11-25 @ 10:28) (65 - 87)  BP: 115/55 (04-11-25 @ 10:28) (91/55 - 115/55)  RR: 16 (04-11-25 @ 10:28) (16 - 18)  SpO2: 93% (04-11-25 @ 10:28) (92% - 97%)  Physical Examination:  General: no acute distress, nontoxic, on HD   HEENT: normocephalic, atraumatic, anicteric  Respiratory: no acc muscle use, breathing comfortably  Cardiovascular: S1 and S2 present  Gastrointestinal: normal appearing, nondistended  Extremities: b/l LE dressing, necrotic toes     Laboratory Studies:  CBC:                       9.5    11.15 )-----------( 325      ( 11 Apr 2025 09:03 )             31.3     WBC Trend:  11.15 04-11-25 @ 09:03  8.84 04-10-25 @ 09:30  7.14 04-08-25 @ 07:34  7.32 04-07-25 @ 08:46  6.03 04-06-25 @ 09:05  6.30 04-05-25 @ 11:25    CMP: 04-11    132[L]  |  96  |  29[H]  ----------------------------<  83  4.0   |  24  |  3.43[H]    Ca    7.3[L]      11 Apr 2025 09:03      Creatinine: 3.43 mg/dL (04-11-25 @ 09:03)  Creatinine: 2.92 mg/dL (04-10-25 @ 09:30)  Creatinine: 3.72 mg/dL (04-09-25 @ 09:41)  Creatinine: 3.13 mg/dL (04-08-25 @ 07:34)  Creatinine: 3.94 mg/dL (04-07-25 @ 08:46)  Creatinine: 3.36 mg/dL (04-06-25 @ 09:05)  Creatinine: 2.83 mg/dL (04-05-25 @ 11:25)    Microbiology: reviewed   Culture - Acid Fast - Other w/Smear (collected 04-08-25 @ 20:58)  Source: Other Sacral Wound Swab    Culture - Fungal, Other (collected 04-08-25 @ 20:58)  Source: Other Sacral Wound Swab  Preliminary Report (04-09-25 @ 08:09):    Testing in progress    Culture - Wound Aerobic (collected 04-08-25 @ 20:58)  Source: Other Sacral Wound Swab  Preliminary Report (04-10-25 @ 21:45):    Few Pseudomonas aeruginosa    Rare Escherichia coli    Numerous Enterococcus faecium  Organism: Pseudomonas aeruginosa (04-10-25 @ 18:26)  Organism: Pseudomonas aeruginosa (04-10-25 @ 18:26)      Method Type: PRIMITIVO      -  Aztreonam: R >16      -  Cefepime: S 8      -  Ceftazidime: S 8      -  Ciprofloxacin: R >2      -  Imipenem: S <=1      -  Levofloxacin: R >4      -  Meropenem: S <=1      -  Piperacillin/Tazobactam: S 16    Radiology: reviewed     Medications:  aspirin  chewable 81 milliGRAM(s) Oral daily  atorvastatin 40 milliGRAM(s) Oral at bedtime  bacitracin/polymyxin B Ointment 1 Application(s) Topical two times a day  benzocaine/menthol Lozenge 1 Lozenge Oral three times a day PRN  bisacodyl 5 milliGRAM(s) Oral every 12 hours PRN  chlorhexidine 2% Cloths 1 Application(s) Topical daily  clopidogrel Tablet 75 milliGRAM(s) Oral daily  Dakins Solution - 1/4 Strength 1 Application(s) Topical every 8 hours  epoetin aleah-epbx (RETACRIT) Injectable 83631 Unit(s) IV Push <User Schedule>  gabapentin 300 milliGRAM(s) Oral at bedtime  heparin   Injectable 5000 Unit(s) SubCutaneous every 8 hours  HYDROmorphone  Injectable 1 milliGRAM(s) IV Push every 6 hours PRN  hydrOXYzine hydrochloride 25 milliGRAM(s) Oral three times a day  ibuprofen  Tablet. 400 milliGRAM(s) Oral every 12 hours  lactobacillus acidophilus 1 Tablet(s) Oral two times a day with meals  meropenem  IVPB 500 milliGRAM(s) IV Intermittent every 24 hours  methocarbamol 750 milliGRAM(s) Oral every 8 hours  midodrine. 10 milliGRAM(s) Oral <User Schedule>  mupirocin 2% Ointment 1 Application(s) Topical <User Schedule>  Nephro-margaret 1 Tablet(s) Oral daily  oxyCODONE    IR 10 milliGRAM(s) Oral every 4 hours PRN  oxyCODONE  ER Tablet 20 milliGRAM(s) Oral every 8 hours  pantoprazole    Tablet 40 milliGRAM(s) Oral before breakfast  polyethylene glycol 3350 17 Gram(s) Oral daily  povidone iodine 10% Solution 1 Application(s) Topical two times a day  senna 2 Tablet(s) Oral at bedtime  sodium chloride 0.9% lock flush 10 milliLiter(s) IV Push every 1 hour PRN    Current Antimicrobials:  meropenem  IVPB 500 milliGRAM(s) IV Intermittent every 24 hours    Prior/Completed Antimicrobials:  piperacillin/tazobactam IVPB.  piperacillin/tazobactam IVPB.  piperacillin/tazobactam IVPB.-  piperacillin/tazobactam IVPB.-  trimethoprim   80 mG/sulfamethoxazole 400 mG  trimethoprim  160 mG/sulfamethoxazole 800 mG  vancomycin  IVPB  vancomycin  IVPB

## 2025-04-11 NOTE — PROGRESS NOTE ADULT - PROBLEM SELECTOR PLAN 1
CT chest with new RLL consolidation and LLL opacities, tracheal debris   -Suspect aspiration related s/p episode of vomiting  -S/p ABX as per ID  -Continue bronchodilators  -Keep sats >90% with o2 PRN  -Pt with low grade temp 3/30, dry cough. RVP negative, now afebrile. Continue to monitor fever curve  -Aspiration precautions  -CXR 4/9 grossly clear.

## 2025-04-11 NOTE — PROGRESS NOTE ADULT - ASSESSMENT
75 year old male with CKD, sp polio, paraplegia with associated neurogenic bladder s/p suprapubic catheter who was admitted s/p fall on 2/16.   ESRD, s/p DEYA [place] 2/17 ans started on HD  2/20 s/p RRT for tremors and confusion -- pt with chronic tremors although notably worse  SPC site with chronic/stable inflammatory changes, no drainage - no sign of SSTI  CTA abd with occlusion of b/l external iliac arteries and b/l superficial femoral arteries with distal reconstitution at popliteal arteries; patent three vessel runoff of RLE with 2 vessel runoff of LLE with occlusion of L mid anterior tibial artery with distal reconstitution  Worsening PAD with worsening ischemic changes, necrotic toes, s/p RLE angiogram b/l iliac artery stents by Vascular 3/3    3/16 no tenderness at suprapubic catheter site, no visible erythema at catheter site on exam  UA w/ many epithelial cells, Ucx negative  s/p zosyn 3/14-3/16  3/19 SPC site remains without erythema but now noted with some tan crusting on dressing  Treated for possible SPC site infection with bactrim 3/19-3/25  3/23 reporting vomiting x5 episodes, no diarrhea or other focal sx  3/25 CT Chest noted RLL consolidative opacities, LLL opacities - pneumonia vs atelectasis, few ill defined nodular opacities in LLL infectious vs inflammatory  3/25 CTAP with subcutaneous gas and infiltration tracking along medial R buttock, possibly related to decubitus wound with gas tracking from the external environment, possible necrotizing air forming infection; rectal wall thickening suggestive of proctitis; b/l mild hydroureteronephrosis to level of bladder with no obstruction and diffuse urothelial thickening of b/l renal collecting systems and ureters similar to 2/20  MRSA screen has been negative   no resp symptoms- no cough, dyspnea or pain, may have aspirated with vomiting earlier, saturating well on RA  3/26 s/p bedside debridement of L knee wound  as dry eschar  from wound edges without drainage and bedside excisional debridement of unstageable sacrum to left buttock into perineum evolving unstageable pressure injury through necrotic dermis into nonviable subcutaneous tissues, debulking debridement of necrotic tissue done by Wound care; other wounds noted including L calf to ankle wound with liquefaction necrotic drainage; R ischium wound with moist pink granular tissue and L buttock fading DTI  3/30 overnight febrile to 100.9F ?inflammatory -- no further fevers noted, WBC wnl, was on meropenem   Completed meropenem x10d course on 4/3   Sacral wound with eschar with surrounding red granulation tissue, noted no obv acute infection, no sig purulence, no sig surrounding erythema noted, remains afebrile, WBC wnl, nontoxic appearing   4/8 s/p OR for sacral and thigh wound debridement    -- brief op note reviewed -- 83y00sm sacral wound with necrotic tissue and rind. Debridement was performed with sharp dissection followed by pulse irrigation. L thigh wounds x2 in posterior thigh s/p debridement with sharp dissection, noted with purulence    -- OR culture with Pseudomonas aeruginosa (sensitivities reviewed), E. coli and E.faecium    4/10 patient allowed surgery and wound care teams to change dressings, unable to take prn opiates at times d/t pain  Leukocytosis noted, suspect reactive, remains afebrile, nontoxic appearing     Recommendations:   Follow OR culture for E.coli and E.faecium sensitivities  Follow path report   Surgery and Wound care teams following  Continue meropenem 500mg IV Q24h (renally dosed)  Tailor antibiotics pending above   Monitor temps/WBC  Continue local wound care, nutrition, offloading as able  HD per renal    SPC site dressing change and care   Continue rest of care per primary team       Bridgette Ramirez M.D.  [facility]  Available on [employer] TEAMS - *PREFERRED*  [phone]  After 5pm on weekdays and all day on weekends - please call [phone]     Thank you for consulting us and involving us in the management of this patients case. In addition to reviewing history, imaging, documents, labs, microbiology, took into account antibiotic stewardship, local antibiogram and infection control strategies and potential transmission issues at time of treatment decision making process.

## 2025-04-11 NOTE — PROGRESS NOTE ADULT - SUBJECTIVE AND OBJECTIVE BOX
Patient is a 75y old  Male who presents with a chief complaint of ESRD requiring HD, uremia (11 Apr 2025 14:08)      SUBJECTIVE / OVERNIGHT EVENTS: ptn is awake, alert, has pain in LE, sacral pain is controlled. after a conversation about pain management and his history re debility starting in youth, ptn asked me to pay in the bed w him. i felt uncomfortable and excused myself. wife at the bedside. ptn is AxOx4. i told him the comment was not appropriate and the ptn laughed it off and asked why.     MEDICATIONS  (STANDING):  aspirin  chewable 81 milliGRAM(s) Oral daily  atorvastatin 40 milliGRAM(s) Oral at bedtime  bacitracin/polymyxin B Ointment 1 Application(s) Topical two times a day  chlorhexidine 2% Cloths 1 Application(s) Topical daily  clopidogrel Tablet 75 milliGRAM(s) Oral daily  Dakins Solution - 1/4 Strength 1 Application(s) Topical every 8 hours  epoetin aleah-epbx (RETACRIT) Injectable 20669 Unit(s) IV Push <User Schedule>  gabapentin 300 milliGRAM(s) Oral at bedtime  heparin   Injectable 5000 Unit(s) SubCutaneous every 8 hours  hydrOXYzine hydrochloride 25 milliGRAM(s) Oral three times a day  ibuprofen  Tablet. 400 milliGRAM(s) Oral every 12 hours  lactobacillus acidophilus 1 Tablet(s) Oral two times a day with meals  meropenem  IVPB 500 milliGRAM(s) IV Intermittent every 24 hours  methocarbamol 750 milliGRAM(s) Oral every 8 hours  midodrine. 10 milliGRAM(s) Oral <User Schedule>  mupirocin 2% Ointment 1 Application(s) Topical <User Schedule>  Nephro-margaret 1 Tablet(s) Oral daily  oxyCODONE  ER Tablet 20 milliGRAM(s) Oral every 8 hours  pantoprazole    Tablet 40 milliGRAM(s) Oral before breakfast  polyethylene glycol 3350 17 Gram(s) Oral daily  povidone iodine 10% Solution 1 Application(s) Topical two times a day  senna 2 Tablet(s) Oral at bedtime    MEDICATIONS  (PRN):  benzocaine/menthol Lozenge 1 Lozenge Oral three times a day PRN Sore Throat  bisacodyl 5 milliGRAM(s) Oral every 12 hours PRN Constipation  HYDROmorphone  Injectable 1 milliGRAM(s) IV Push every 6 hours PRN severe breakthrough pain  oxyCODONE    IR 10 milliGRAM(s) Oral every 4 hours PRN Severe Pain (7 - 10)  sodium chloride 0.9% lock flush 10 milliLiter(s) IV Push every 1 hour PRN Pre/post blood products, medications, blood draw, and to maintain line patency      Vital Signs Last 24 Hrs  T(F): 97.3 (04-11-25 @ 10:28), Max: 98.1 (04-11-25 @ 00:00)  HR: 65 (04-11-25 @ 10:28) (65 - 87)  BP: 115/55 (04-11-25 @ 10:28) (91/55 - 115/55)  RR: 16 (04-11-25 @ 10:28) (16 - 18)  SpO2: 93% (04-11-25 @ 10:28) (92% - 97%)  Telemetry:   CAPILLARY BLOOD GLUCOSE        I&O's Summary    10 Apr 2025 07:01  -  11 Apr 2025 07:00  --------------------------------------------------------  IN: 240 mL / OUT: 100 mL / NET: 140 mL        PHYSICAL EXAM:  GENERAL: NAD, well-developed  HEAD:  Atraumatic, Normocephalic  EYES: EOMI, PERRLA, conjunctiva and sclera clear  NECK: Supple, No JVD  CHEST/LUNG: Clear to auscultation bilaterally; No wheeze  HEART: Regular rate and rhythm; No murmurs, rubs, or gallops  ABDOMEN: Soft, Nontender, Nondistended; Bowel sounds present  EXTREMITIES:  2+ Peripheral Pulses, No clubbing, cyanosis, or edema  PSYCH: AAOx3  NEUROLOGY: non-focal  SKIN: No rashes or lesions    LABS:                        9.5    11.15 )-----------( 325      ( 11 Apr 2025 09:03 )             31.3     04-11    132[L]  |  96  |  29[H]  ----------------------------<  83  4.0   |  24  |  3.43[H]    Ca    7.3[L]      11 Apr 2025 09:03            Urinalysis Basic - ( 11 Apr 2025 09:03 )    Color: x / Appearance: x / SG: x / pH: x  Gluc: 83 mg/dL / Ketone: x  / Bili: x / Urobili: x   Blood: x / Protein: x / Nitrite: x   Leuk Esterase: x / RBC: x / WBC x   Sq Epi: x / Non Sq Epi: x / Bacteria: x        RADIOLOGY & ADDITIONAL TESTS:    Imaging Personally Reviewed:    Consultant(s) Notes Reviewed:      Care Discussed with Consultants/Other Providers:

## 2025-04-11 NOTE — PROGRESS NOTE ADULT - ASSESSMENT
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 The initial plan was that he would present to the [facility] ER Monday 2/17 for HD initiation. However, day of admission, he fell and sustained trauma to his knee. Called to consult for cough, elevated R hemidiaphragm.

## 2025-04-11 NOTE — PROGRESS NOTE ADULT - ASSESSMENT
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 He is well known to me from multiple recent admisions  s/p admissions at [facility] 12/20-12/25/24 and 2/4-2/7 with SONDRA on CKD with associated uremic symptoms.   At the last admission he had expressed strong interest to try to hold off with HD intiation but he has been getting worse clinically at home.  His SONDRA and uremic symptoms significantly improved after the first admission; they improved a to mild extent during the 2nd admission to the point where he was able to be discharged without HD. Since then, however, he has worsened further.   He has been suffering from progressively worsening diffuse pruritus, loss of appetite, and generalized weakness and falls.   His nephrologist and PCP has been speaking with him and his family over the past few days,   The initial plan was that he would present to the [facility] ER Monday 2/17 (ie tomorrow) for HD initiation. Today, however, he fell and sustained trauma to his knee. Given the fall and concern for knee fracture, he presented to the ER today. multiple xrays show no Fractures.      CKD5, uremia, requiring initiation of HD  Rash, seborrheic dermatitis  Pruritis  Anemia  PAD  SP catheter, chronic  Left inferior pole acute on chronic patella Fx    plan  - HD via R subclavian permacath  - 4/11:  ptn is awake, alert, has pain in LE, sacral pain is controlled. after a conversation about pain management and his history re debility starting in youth, ptn asked me to pay in the bed w him. i felt uncomfortable and excused myself. wife at the bedside. ptn is AxOx4. i told him the comment was not appropriate and the ptn laughed it off and asked why. as far as wound care, ptn has been refusing to have dressing changed post op. he has fecal incontinence.   -4/10:  pain is controlled, on Hemal for P. aeruginosa growing in sacral decub debridement Cx.ptn is DNR/DNI, was seen by palliaitive care. being followed by pain for pain management in LE and 2/2 sacral decubiti.  awaiting dressing change by surgical team.   -4/9: s/p debridement in OR by general surgery. pain consult and palliative consult reviewed. pain regimen reviewed w pain team.  they d/w ptn and daughter. ptn refused for the dressing to be changed by surgical team today. they tried several times. wound care post op is very important in healing process. meropenem restarted  -4/8: ptn is awaiting debridement in the OR today, debulking and wound care at the bedside has been challenging 2/2 pain not allowing 2/2 pain and fear of pain  - 4/7: ptn complaining his pain meds are not covering his pain completely. yesterday he was talking about death a lot and how he wished to die. had psych eval today. today he is doing better emotionally, not talking about drath and wants to cont treatment. he is tolerating HD, he finished ABx for PNA last week. his wounds are the primary reason of admission at this point.  I also called palliaitive consult.   He was seen by surgery for debridement of sacral decub, ptn is refusing diverting colostomy. on admission ptn had intact skin but did show primary stages of pressure injury in the sacral region. ptn didnt allow us to turn him 2/2 pain in LLE for several days, despite our concern of further pressure injury on the sacrum. once he was turned we discovered skin break and started wound care. again he would not allow us to turn him. he said the wound is ok, its scabbed, Dee Dee( his wife) is handling it. I called wound care to see him, wound care discovered that the area had necrosis, not scabs, debulking at the bedside was performed, ptn complained it was extremely painful. ptn was seen by wound care attending and recommended debridement in the OR under anesthesia. it is planned for 4/9. it was also recommended he should have a diverting colostomy. ptn is refusing it. HCP his daughter Yanique aware.   -4/6: spoke w ptn's daughter Yanique on the phone today x 20 min, spoke to ptn , his wife and daughter yanique at the bedside x 60 min today. ptn is agreeing to debridement in the OR, adamantly refusing diverting colostomy he would consider it if post debridement he would not improve and would require another debridement. ptn was seen by urology and ID bc daughter complained increased amount of "pus" at the SPT, both ID and urology feel there is no acute infection/cellulitis, the drainage at the site and expected and not unusual. daughter and wife state ptn didnt have sacral wound when he came. on admission with the aide of his wife we flipped him and he had 1st degree pressure decubiti sacrally, skin was intact.   - 4/5: thorough eval by wound care attending 4/4/25, case d/w wound care attending Dr Dugan. as per her recommendation placed general surgery consult for OR debridement and diverting colostomy. ptn had sacral decubiti prior to admission  -4/4: dr dugan from wound care had an extensive conversation w the ptn and daughter yanique on the phone at bedside. ptn needs general surgery consult for OR debridement of necrotic tissue of the sacral decubiti and diverting colostomy. ptn did not tolerate debridement at the bedside. . completed Abx for PNA  -4/3: ptn's insurance changed to straight medicare/medicaid. now able to go to [facility], but ptn wants to go home. will need equipment set up. this was d/w CM and ACP, daughter Yanique on the phone and wife at bedside today. today is last day of ABx. will transition pain meds to po DIlaudid from IV, will also need outptn pain management F/U, outptn wound care, home PT, need outptn HD set up  -4/2: seen prior to going to HD, no new events. tomorrow completing 10 day course of Meropenem. ongoing need for narcotics 2/2 pain.   -4/1: ptn is frustrated about a prolonged hospitalization but states he feels better overall and once medically cleared he will be ready to go home, not sooner than 4/7. completing ABx on 4/3.   -3/31: at HD today had 1.7 L UF, post HD was hypotense, gave gentle IVF , BP still a bit low. afebrile, loose BMs but not diarrhea, GI PCR not sent, RVP neg. . on Meropenem course for total of 10 days, last day 4/3, would care for decubiti and left knee wound post trauma  -3/30: Tmax 100.9, c/o dry cough and diarrhea but not watery. pain is controlled at the time of visit. wife at bedside. will check RVP panel, GI PCR, sine diarrhea non watery will not order C. Diff. ID to follow  - 3/29: cont meropenem, seen by coverage  - 3/28: pain from decubitus ulcer and Left knee post debridement continues. wound care recs in place. on Meropenem for PNA, pulm and ID following. HD as per renal. HDS  -3/27: ptn is complaining that pain post decubital and left knee debridement has been severe. his daughter is on speaker phone, wife is at bedside. his pain is controlled when meds are administered. he is very uncomfortable due to the pain at the site of decubitus debridement. lyrica helped his pain before but made him drowsy, griffin with gabapentin. he doesnt want them to be resumed. will cont dilaudid, oxycodone, oxycontin, ibuprofen. he is on Meropenem for aspiration PNA/HCAP. [facility] for DVT ppx. seen by vascular cardiology to address R IJ post shiley non occlusive DVT. full AC was not recommended. will check rpt doppler in 3-4 weeks.   - 3/26: Wound care performed bedside debridement of Left knee wound 2/2 lifted eschar, and sacral decubitus. findings c/w possible infection and mainly fat necrosis. cont Meropenem. ID following. podiatry following for gangrenous toes. will give additional single dose IV DIlaudid 2/2 severe pain post debridement.    - 3/25: wife at bedside, daughter on speaker phone at bedside. ptn is pain free at the time of visit and states he wants to cont the pain regiment he is presently on. ct C/A/P revealed: RLL PNA, prob aspiration, stercoral proctitis, decubitus ulcer w possible progression to sacral OM, R IJ nonocclusive DVT. these findings d/w ptn ,his family, wound care, ACP, Vascular, Nephrology, ID, pUlm, GI. meropenem initiated, Bactrim stopped. ptn states when he is cleared for DC, he wants to go home , not to [facility], not to SNF. daughter and wife aware.   -3/24: ptn  was seen by GI for N/V, its not clear the etiology, will obtain Ct C/A/P. ptn states he is coughing up green mucus as well  -3/23:  ptn is in good spirits, says he vomited "spit" this am, but tolerated all meals without vomiting. no abd pain, no undigested food in the vomit. afebrile, no diarrhea, RVP neg. GI consulted.   -3/21-22: ptn feels well.  pain is controlled. still no accepting facility for [facility]  -3/20: ptn states he has severe low back and LLE pain though overall is improving.   -3/19: ptn w tan crusting around SPC , states its painful. started on po Bactrim, renally dosed by ID for cellulitis.   -3/18: LLE paraesthesias are chronic, will d/w CM re dc planning to [facility]    -3/17: ptn states he is lethargic, he has LLE numbness which is new and severe pain at SPC insertion site. this was ID, d/w neuro, renal and vascular. as per ID: no sign of an acute infection recommend CT A/P.   -3/16:  urine cx from 3/15 NTD, zosyn DCed, awaiting dc to [facility], not sure will be able to go to [facility] since there is an insurance coverage conflict. HD as per renal  - 3/15: spoke w ptn's daughter today re denial from [place] for [facility]. ptn has [email or web address] medicare advantage plan, which [facility] doesnt accept. i recommended to speak  to Cm and to the insurance company to find participating facilities. ptn is stable today. pain and erythema at SP catheter site has resolved today. seen by ID, will cont ZOSYN for now. UCx sent.   - 3/14: suprapubic tube changhed by , ptn has crusting at the insertion site and pain. will start Abx, urine always has sediment, will sent for cx, start Vanco/ZOsyn. ID consult called. check MRSA/MSSA PCR  -3/13: ptn is doing well. ptn has an accepting rehab facility, now pending AUTH.   -3/12: ptn is no longer constipated. doing well off prn IV DIlaudid. awaiting dc to [facility]  -3/11: ptn has no new c/o other than feeling constipated, lactulose ordered. also in preparation for [facility] will dc prn IV DIlaudid, cont prn OXy IR  -3/10:  ptn is awake, alert, wife at bedside, i instructed them to give rehab choices. also awaiting SPC change to be done by urology. pain is controlled  -3/9: seen by PT, will refer to [facility]. choices to be given in AM. this was d/w ptn, daughter, wife.   -3/8:  ptn didnt want to get PT eval done, will reorder. ptn needs [facility] placement. PMNR consult ordered  - 3/7: ptn is alert , pain is controlled, DC planning d/w ptn and HCP, daughter Yanique  -3/6:  ptn is awake, alert, ecchymotic feet look improved, pain is controlled. DC planning to [facility]  3/4-5 - s/p b/l iliac arteries angioplasty and stent placements on 3/3. today has new ecchymoses in b/l LE, concern for arterial insufficiency. vascular aware.. wound from Left knee immobilizer is dressed and healing. pain is controlled.   LEFT UE AVF placement/scheduling will be on outptn basis as per vascular. dc planning to [facility]  - 3/3: ptn is s/p angiogram RLE : b/l iliac arteries w successful angioplasty and stents. in PACU. awaitng HD.  ptn has a pressure wound from the LLE immobilizer posteriorly proximal to the knee. its been on 2/2 knee fracture, applied by ortho and managed by ptn's wife as per ptn's and wife's request , this was d/w nursing and nurse manager ms Ruiz.. immobilizer should be managed by orthopedics and nursing. will keep it off, only keep on when repositioning for care and then remove. wound care to F/U . this was discussed at length w daughter Yanique and wife Dee Dee.   -3/1-3/2: ptn is smiling, pain free, had HD 2/28 and 3/1, awaiting RLE angiogram 3/3, on Heparin drip, euvolemic  -2/28: ptn is lethargic, awaiting RLE angiogram possible fem-fem bypass hopefully on 3/3 pending OR availability, awaiting HD today    -2/27:  plan for angiogram in am with possible angioplasty, possible stent, possible fem-fem bypass. medically cleared for angiogram and possible surgery, stent, angioplasty. pain is controlled. remains on heparin drip as per vascular. HD as per renal    -2/26:  ptn is sleeping, pain is controlled, he was seen by wound care and vascular attending. planning on angiogram 2/2 severe PAD in LE on CTA. he also spoke to HCP. ptn and HCP in agreement. ptn was started on HEPARIN drip as per vascular recs. RIJ permacath done 2/25    - 2/25: ptn states he is hallucinating, but not at present, his MS is at baseline, his pain is controlled, he still needs prn pain meds when he is moved in the bed or for testing or for HD. awaiting Permacath, AVF creation, LE bypass to be d/w Dr. Swenson and the ptn tomorrow. ptn has cardiology clearance  if he opts for. Ptn has sacral decubiti and posterior thigh and buttock decibiti and b/l heel decubiti. Z flow fabienne d/w RN, get wound care consult    -2/24: pain is controlled  if the LLE is immobile and fully extended. doesn't tolerate the knee immobilizer. has a medium condyle and acute on chronic patella fx in LEFT knee. as per vascular ptn will need iliac stent  w a fem-fem bypass, possible axillo-femoral bypass. for this surgery he would need cardiac work up . more pressing surgery is LUE AVF creation,  in IR will arrange for Permacath. for pain control: Motrin 400 mg q12H, Oxy ER 30 mg q12H, Oxy IR 10 for mod pain and dilaudid 2 mg iv for severe pain. still has pruritis though improved, will raise atarax 25 mg to tid. plan of care d/w daughter Norma Persaud , ptn and his wife. Also d/w renal, card, vascular, ACP    -2/23: Daughter Yanique is the HCP, she and the ptn filled out the paperwork. daily plan and findings d/w her and the ptn. MS is at abseline, c/o b/l LE foot pain and Left Knee pain. xrays of left knee: cannot r/o acute on chronic inferior pole patellar Fx. knee immobilizer is on, awaiting ortho consult. doubt needs any intervention awaiting Ct chest today, will add CT Left knee. ptn states itching has recurred, severe pain has recurred. will resume Atatrax ( 25 mg bid) and Oxycodone ER , but at a lower dose 15 mg q12H. cont prn analgesics. HD as per renal, awaiting Vascular consult f/u re CTA A/L &LE and recs. ptn also wants AVF surgery on this admission. Coughing has stopped    - 2/22: ptn is drowsy but arousable, calmer today, answers questions appropriately. he is pain free, he stopped coughing, he denies having pruritis: will DC:  OXY ER, Atarax, Tessalon perles.     -  2/21: ptn is tearful, a bit confused, coughing, recognizes me and family at bedside. GOC d/w daughter and wife. AMS prob delirium 2/2 acute illness +/- opiods, lyrica, atarac. will lower atarak to tid, dc lyrica, cont Oxy 2/2 ptn has severe LE pain. neuro called for eval. Head ct done yesterday w no acute findings. CTA A/P w LE run fof: severe PAD, vascular to follow up on plan fo care. plan for HD tomorrow and next week place on tiw schedule of MWF. will need tunneled catheter prior to DC and will d/w vascular scheduling of AVF. ptn wants all to be done while inptn. daughter wants to be HCP as per ptn's wishes. she was given paperwork to get it signed and witnessed and will present to nursing thereafter. details of findings and complaints and plan of care d/w daughter and wife. spent 60 min. RVP ordered. start mucinex , tessalon perles, duonebs, get CT chest to r/o PNA. pulm called    -  2/20:  ptn had RRT 2/2 AMS and tremors. no SZ, no LOC. noted to have Na 123( hyponatremia), given 500 cc NS. Gabapentin DCed. ptn had been taking gabapentin at home PTA. Pain is controlled. Pruritis resolved, tolerating HD otherwise.     - Pain management:  cont Oxycodone 10 IR q6H,  DIlaudid prn severe pain 2 mg q4H prn.   - cont outptn meds  - DVT ppx w [facility]SC

## 2025-04-11 NOTE — PROGRESS NOTE ADULT - NS ATTEND AMEND GEN_ALL_CORE FT
seen by me and spoke to her daughter:  he seems OK:  resp wise  : some mucus plugging:  add mucomyst:  and cont BD :  agree with above
awake and alert  :  pain is better:   VBG today mildly acidotic  :   cont to monitor
pulmonary wise seems OK: no sob:  no wheezing : no abg done today  :  ordered for tomorrow morning:  he is getting pain meds also  :
seems comfortable from pulm point if view:  no sob  ABG today is pretty decent;  n o co2 retention; significant:   cont current care:  agree with above
he is alert and awake and responding to questions: on room air:   no pain at this time   no resp distress:   ct scan chest noted:   agree with above;
pulm wise seemed stable for the procedure/  for angiography and possible stenting:   monitor resp status in post op period;  agree with above
seems  OK; no sob : on room air 97%:   agree with above  ct chest again reviewed:   cont BD and need to do incentive spirometry
seems to be doing  ok : no sob:  no  cough : no phlegm :   repeat  cxr is normal    on room air
seems OK: pulmonary wise seems OK; had elo cyanosed toes:  had elo stent placement: can wean off oxygen
he seems comfortable:   on 2 L of oxygen;   lying supine   no sob:   cont current rx:  agree with above:
seems OK: NO SOB:  for or today for sacral debridement
his pneumonia has resolved:  last chest xray was negative:  would need repeat ct scan chest in 6 weeks time after dc: on room air

## 2025-04-11 NOTE — PROGRESS NOTE ADULT - PROBLEM SELECTOR PLAN 2
-CXR with elevated R hemidiaphragm (not present on CXR in December 2024)  -CT chest with LLL, RLL GGO, mucoid impacted airways. No clear PNA  -Suggest Duoneb q6h  -S/p Mucomyst x5 days   -Incentive spirometry   -CXR 3/1 with improved R hemidiaphragm, low lung volumes on L  -CXR 4/9 grossly clear.

## 2025-04-11 NOTE — PROGRESS NOTE ADULT - SUBJECTIVE AND OBJECTIVE BOX
Date of Service: 04-11-25 @ 10:53    Patient is a 75y old  Male who presents with a chief complaint of ESRD requiring HD, uremia (11 Apr 2025 08:49)      Any change in ROS:   Seen in HD  Pain well controlled  Denies CP, SOB     MEDICATIONS  (STANDING):  aspirin  chewable 81 milliGRAM(s) Oral daily  atorvastatin 40 milliGRAM(s) Oral at bedtime  bacitracin/polymyxin B Ointment 1 Application(s) Topical two times a day  chlorhexidine 2% Cloths 1 Application(s) Topical daily  clopidogrel Tablet 75 milliGRAM(s) Oral daily  Dakins Solution - 1/4 Strength 1 Application(s) Topical every 8 hours  epoetin aleah-epbx (RETACRIT) Injectable 38304 Unit(s) IV Push <User Schedule>  gabapentin 300 milliGRAM(s) Oral at bedtime  heparin   Injectable 5000 Unit(s) SubCutaneous every 8 hours  hydrOXYzine hydrochloride 25 milliGRAM(s) Oral three times a day  ibuprofen  Tablet. 400 milliGRAM(s) Oral every 12 hours  lactobacillus acidophilus 1 Tablet(s) Oral two times a day with meals  meropenem  IVPB 500 milliGRAM(s) IV Intermittent every 24 hours  methocarbamol 750 milliGRAM(s) Oral every 8 hours  midodrine. 10 milliGRAM(s) Oral <User Schedule>  mupirocin 2% Ointment 1 Application(s) Topical <User Schedule>  Nephro-margaret 1 Tablet(s) Oral daily  oxyCODONE  ER Tablet 20 milliGRAM(s) Oral every 8 hours  pantoprazole    Tablet 40 milliGRAM(s) Oral before breakfast  polyethylene glycol 3350 17 Gram(s) Oral daily  povidone iodine 10% Solution 1 Application(s) Topical two times a day  senna 2 Tablet(s) Oral at bedtime    MEDICATIONS  (PRN):  benzocaine/menthol Lozenge 1 Lozenge Oral three times a day PRN Sore Throat  bisacodyl 5 milliGRAM(s) Oral every 12 hours PRN Constipation  HYDROmorphone  Injectable 1 milliGRAM(s) IV Push every 6 hours PRN severe breakthrough pain  oxyCODONE    IR 10 milliGRAM(s) Oral every 4 hours PRN Severe Pain (7 - 10)  sodium chloride 0.9% lock flush 10 milliLiter(s) IV Push every 1 hour PRN Pre/post blood products, medications, blood draw, and to maintain line patency    Vital Signs Last 24 Hrs  T(C): 36.2 (11 Apr 2025 07:28), Max: 36.7 (11 Apr 2025 00:00)  T(F): 97.2 (11 Apr 2025 07:28), Max: 98.1 (11 Apr 2025 00:00)  HR: 71 (11 Apr 2025 07:28) (71 - 87)  BP: 99/48 (11 Apr 2025 07:28) (91/44 - 109/63)  BP(mean): --  RR: 17 (11 Apr 2025 07:28) (17 - 18)  SpO2: 92% (11 Apr 2025 07:28) (92% - 97%)    Parameters below as of 11 Apr 2025 07:28  Patient On (Oxygen Delivery Method): room air        I&O's Summary    10 Apr 2025 07:01  -  11 Apr 2025 07:00  --------------------------------------------------------  IN: 240 mL / OUT: 100 mL / NET: 140 mL          Physical Exam:   GENERAL: NAD, well-groomed, well-developed  HEENT: VINNY/   Atraumatic, Normocephalic  ENMT: No tonsillar erythema, exudates, or enlargement; Moist mucous membranes, Good dentition, No lesions  NECK: Supple, No JVD, Normal thyroid  CHEST/LUNG: grossly clear   CVS: Regular rate and rhythm; No murmurs, rubs, or gallops  GI: : Soft, Nontender, Nondistended; Bowel sounds present  NERVOUS SYSTEM:  Alert & Oriented X3  EXTREMITIES:  LE wounds   LYMPH: No lymphadenopathy noted  SKIN: No rashes or lesions  ENDOCRINOLOGY: No Thyromegaly  PSYCH: Appropriate    Labs:                              9.5    11.15 )-----------( 325      ( 11 Apr 2025 09:03 )             31.3                         9.7    8.84  )-----------( 278      ( 10 Apr 2025 09:30 )             32.8                         10.0   7.14  )-----------( 251      ( 08 Apr 2025 07:34 )             32.9     04-11    132[L]  |  96  |  29[H]  ----------------------------<  83  4.0   |  24  |  3.43[H]  04-10    136  |  99  |  24[H]  ----------------------------<  64[L]  3.9   |  25  |  2.92[H]  04-09    134[L]  |  96  |  43[H]  ----------------------------<  56[L]  4.5   |  22  |  3.72[H]  04-08    133[L]  |  98  |  33[H]  ----------------------------<  63[L]  4.3   |  22  |  3.13[H]    Ca    7.3[L]      11 Apr 2025 09:03  Ca    7.2[L]      10 Apr 2025 09:30      Urinalysis Basic - ( 11 Apr 2025 09:03 )    Color: x / Appearance: x / SG: x / pH: x  Gluc: 83 mg/dL / Ketone: x  / Bili: x / Urobili: x   Blood: x / Protein: x / Nitrite: x   Leuk Esterase: x / RBC: x / WBC x   Sq Epi: x / Non Sq Epi: x / Bacteria: x            RECENT CULTURES:  04-08 @ 20:58 Other Sacral Wound Swab   PRIMITIVO      Pseudomonas aeruginosa  Pseudomonas aeruginosa     Few Pseudomonas aeruginosa  Rare Escherichia coli  Numerous Enterococcus faecium      Studies  < from: Xray Chest 1 View- PORTABLE-Urgent (Xray Chest 1 View- PORTABLE-Urgent .) (04.09.25 @ 17:11) >    ACC: [id number] EXAM:  XR CHEST PORTABLE URGENT 1V   ORDERED BY: ASHLEY ROUSE     PROCEDURE DATE:  04/09/2025          INTERPRETATION:  EXAMINATION: XR CHEST URGENT    CLINICAL INDICATION: r/o pneumonia    TECHNIQUE: Single frontal, portable view of the chest was obtained.    COMPARISON: Chest x-ray 3/23/2025.    FINDINGS:  Right IJ central venous catheter terminating in the cavoatrial junction.  Elevated right hemidiaphragm.  The heart is unchanged in size.  The lungs are clear.  There is no pneumothorax or pleural effusion.    IMPRESSION:  Clear lungs.    < end of copied text >

## 2025-04-11 NOTE — PROGRESS NOTE ADULT - SUBJECTIVE AND OBJECTIVE BOX
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     Patient admitted 52 days ago s/p fall, knee trauma, and need to start HD. Patient w/ acute pain managed by primary team with escalating high dose opioids. Called for assistance in managing uncontrolled pain 2/2 multiple wounds, PAD and gangrenous toes despite high dose opioids.   Hospital course includes RLE angiogram w/ b/l iliac artery stents 3/3, PNA, RIJ DVT, Hypotension, pressure ulcers sacrum, buttocks, B/L heels, posterior left thigh, wound infections, likely sacral OM, 4/8 s/p wound debridement. Pt refusing diverting colostomy.    Out- patient pain regimen: None  Out Patient Pain Management provider: None  Pain Scores: 10/10 to 0/10    Pt appears much more comfortable today, reports pain controlled w/ current regimen, minimal use of Oxy IR and Dilaudid IV. Pleased to be sleeping since start of Gabapentin at bedtime. Re-educated re: regimen, aware he can ask for PRN meds for breakthrough pain.    PHYSICAL EXAM  GENERAL: Seen at bedside, well-groomed, well-developed, appears stated age, no signs of toxicity  NEURO: Alert & Oriented X3, Good concentration; Follows commands  HEENT: Head atraumatic, normocephalic; speech clear and fluent  GI: Appetite fair, (+)BM  : Voiding   EXTREMITIES: B/L LE (+)edema, (+)gangrenous toes, dressings C/D/I  PSYCH: affect normal; good eye contact; no signs of depression or anxiety      T(C): 36.3 (04-11-25 @ 10:28)  HR: 65 (04-11-25 @ 10:28)  BP: 115/55 (04-11-25 @ 10:28)  RR: 16 (04-11-25 @ 10:28)  SpO2: 93% (04-11-25 @ 10:28)      MEDICATIONS  (STANDING):  aspirin  chewable 81 milliGRAM(s) Oral daily  atorvastatin 40 milliGRAM(s) Oral at bedtime  bacitracin/polymyxin B Ointment 1 Application(s) Topical two times a day  chlorhexidine 2% Cloths 1 Application(s) Topical daily  clopidogrel Tablet 75 milliGRAM(s) Oral daily  Dakins Solution - 1/4 Strength 1 Application(s) Topical every 8 hours  epoetin aleah-epbx (RETACRIT) Injectable 37407 Unit(s) IV Push <User Schedule>  gabapentin 300 milliGRAM(s) Oral at bedtime  heparin   Injectable 5000 Unit(s) SubCutaneous every 8 hours  hydrOXYzine hydrochloride 25 milliGRAM(s) Oral three times a day  ibuprofen  Tablet. 400 milliGRAM(s) Oral every 12 hours  lactobacillus acidophilus 1 Tablet(s) Oral two times a day with meals  meropenem  IVPB 500 milliGRAM(s) IV Intermittent every 24 hours  methocarbamol 750 milliGRAM(s) Oral every 8 hours  midodrine. 10 milliGRAM(s) Oral <User Schedule>  mupirocin 2% Ointment 1 Application(s) Topical <User Schedule>  Nephro-margaret 1 Tablet(s) Oral daily  oxyCODONE  ER Tablet 20 milliGRAM(s) Oral every 8 hours  pantoprazole    Tablet 40 milliGRAM(s) Oral before breakfast  polyethylene glycol 3350 17 Gram(s) Oral daily  povidone iodine 10% Solution 1 Application(s) Topical two times a day  senna 2 Tablet(s) Oral at bedtime    MEDICATIONS  (PRN):  benzocaine/menthol Lozenge 1 Lozenge Oral three times a day PRN Sore Throat  bisacodyl 5 milliGRAM(s) Oral every 12 hours PRN Constipation  HYDROmorphone  Injectable 1 milliGRAM(s) IV Push every 6 hours PRN severe breakthrough pain  oxyCODONE    IR 10 milliGRAM(s) Oral every 4 hours PRN Severe Pain (7 - 10)  sodium chloride 0.9% lock flush 10 milliLiter(s) IV Push every 1 hour PRN Pre/post blood products, medications, blood draw, and to maintain line patency      Allergies    No Known Allergies        Pertinent labs, radiology, additional studies:  Reviewed                          9.5    11.15 )-----------( 325      ( 11 Apr 2025 09:03 )             31.3       04-11    132[L]  |  96  |  29[H]  ----------------------------<  83  4.0   |  24  |  3.43[H]    Ca    7.3[L]      11 Apr 2025 09:03

## 2025-04-11 NOTE — PROGRESS NOTE ADULT - ASSESSMENT
74 yo M w/ PMHx of multiple comorbidities including polio, chronic pain with multiple sequela including LE weakness requiring wheelchair attributed to polio since 18 months of age, scoliosis, neurogenic bladder s/p suprapubic catheter, and colostomy s/p iatrogenic rectal rupture injury, reversal of colostomy, anemia, erectile dysfunction, multiple fractures including patella and femoral condyle with gianluca placement of the lower extremities, deep venous thrombosis, elevated hemidiaphragm, hyperlipidemia, bacterial sepsis, electrolyte imbalance, shoulder surgery, cataract surgery, tremors, PAD who initially presented to ED after fall from wheelchair found to have ESRD now on HD w progressive wounds. Surgery consulted for evaluation of sacral wound. S/p sacral ulcer and thigh wounds debridement on 4/8.    PLAN:  - Dressing changes by nursing (instructions in provider to RN)  - Remainder of care per primary    Trauma -[id number]

## 2025-04-11 NOTE — PROGRESS NOTE ADULT - SUBJECTIVE AND OBJECTIVE BOX
Surgery Progress Note    S: Patient seen and examined. No acute events overnight. Sleeping comfortably in the AM.    O:  Physical Exam:  Gen: Sleeping comfortably  Resp: Unlabored breathing, on room air    Vital Signs Last 24 Hrs  T(C): 36.5 (11 Apr 2025 16:22), Max: 36.7 (11 Apr 2025 00:00)  T(F): 97.7 (11 Apr 2025 16:22), Max: 98.1 (11 Apr 2025 00:00)  HR: 76 (11 Apr 2025 16:22) (65 - 87)  BP: 97/62 (11 Apr 2025 16:22) (91/55 - 115/55)  BP(mean): --  RR: 18 (11 Apr 2025 16:22) (16 - 18)  SpO2: 92% (11 Apr 2025 16:22) (92% - 97%)    Parameters below as of 11 Apr 2025 16:22  Patient On (Oxygen Delivery Method): room air        I&O's Detail    10 Apr 2025 07:01  -  11 Apr 2025 07:00  --------------------------------------------------------  IN:    Oral Fluid: 240 mL  Total IN: 240 mL    OUT:    Indwelling Catheter - Suprapubic (mL): 100 mL  Total OUT: 100 mL    Total NET: 140 mL                                9.5    11.15 )-----------( 325      ( 11 Apr 2025 09:03 )             31.3       04-11    132[L]  |  96  |  29[H]  ----------------------------<  83  4.0   |  24  |  3.43[H]    Ca    7.3[L]      11 Apr 2025 09:03

## 2025-04-11 NOTE — PROGRESS NOTE ADULT - ASSESSMENT
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  came in after fall and for HD.     2/20 CTH neg   \Na 123 --> now 133   A1c 5.7   TSH WNL 3.46   o/e 2/21 AAOx2, uppers 4/5, lowers limited .  2/23; family bedside upset that he has pain in leg after he was moved.  patient going for imaging now.   s/p R IJ permacath by IR 2/25 2/27 AAOx3   sopoke with family bedisde 3/17 wife says mental status okay, sometimes up and down but good   3/18 was told he has "new neuropathy" spoke iwth patient and wife at bedside. states its the same from before not a new issue.    3/21 mental status stable. c/o pubic cathter   3/23 c/o nausea today   no new changes   4/3 neuro intact. LE same.  c/o Nausea   s/p sacral wound debridment     Imrpssion  1) AMS, waxing and waing , likely multifactorial from infection, metabolic/electrolyte derrangements/ delerium / medication effect , ureemia which is imporving   2) polio  3) fall 2/2 weakness  4) hyponatremia to 123 2/20  improved 133 -->136;   back down to 129 -- 133      -  meropenum  still   - patient wants home f/u PT   - wound care    - AVF outpatient l; f/u vascular     f/u vascular; no vascluar options ; f/u with Messi from vasculare to schedule outpatient procedure   - on DAPT   - was getting oxycodone ER 30mg BID and oxy PRN IR i10 and dilauded PRN ;   consider gabapentin 200mg TID or lyrica 50mg BID fo nueorpathy if no objection ; patient states he still has pain but its better.  vascular has seen patient.  poor prognosis of LLE   - f/u psych   - limit sedating meds   - continue to monitor and correct metabolic derrangements .  - delerium precuations.   - frequent re orientation  - check b12, RPR, ammonia level if never checked    - PT/OT   - check FS, glucose control <180  - GI/DVT ppx  - Thank you for allowing me to participate in the care of this patient. Call with questions.       Paul Limon MD  Vascular Neurology  Office: [phone]

## 2025-04-11 NOTE — PROGRESS NOTE ADULT - ASSESSMENT
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     Patient admitted 52 days ago s/p fall, knee trauma, and need to start HD. Patient w/ acute pain managed by primary team with escalating high dose opioids. Called for assistance in managing uncontrolled pain 2/2 multiple wounds, PAD and gangrenous toes despite high dose opioids.   Hospital course includes RLE angiogram w/ b/l iliac artery stents 3/3, PNA, RIJ DVT, Hypotension, pressure ulcers sacrum, buttocks, B/L heels, posterior left thigh, wound infections, likely sacral OM, 4/8 s/p wound debridement. Pt refusing diverting colostomy.    Out- patient pain regimen: None  Out Patient Pain Management provider: None  Pain Scores: 10/10 to 0/10    Pt appears much more comfortable today, reports pain controlled w/ current regimen, minimal use of Oxy IR and Dilaudid IV. Pleased to be sleeping since start of Gabapentin at bedtime. Re-educated re: regimen, aware he can ask for PRN meds for breakthrough pain.    Continue current regimen:  OxyContin 20 mg Q8h  Oxycodone 10 mg Q4h PRN severe pain  IV Dilaudid 1 mg Q6h PRN severe breakthrough pain  For wound care, administer a dose of IV Dilaudid 15 minutes before dressing change. When ready for discharge discontinue the IV Dilaudid and administer a dose of Oxy IR 10 mg 30 minutes before dressing change  Robaxin 750 mg Q8h  Gabapentin 300 mg QHS (CrCl = 24.2 max daily dose is 700 mg)  Consider adding Tylenol around the clock  Ibuprofen per Nephrology    Warm/cool packs for comfort  Continue Bowel Regimen  Incentive Spirometer  PT per primary team  Monitor for sedation, respiratory depression  GAP on board (Geriatric and Palliative Care) for GOC in this patient with advanced illness  Out-patient pain practice list to be provided for pain management after discharge  Narcan Rescue Kit on discharge (Naloxone 4 mg/0.1 ml nasal spray - 1 spray q 2-3 minutes alternating between nostrils)    Signing off, reconsult as needed    Time spent on encounter:    40    Minutes    [facility]  [phone]

## 2025-04-11 NOTE — PROGRESS NOTE ADULT - NS ATTEND OPT1A GEN_ALL_CORE
Medical decision making

## 2025-04-12 NOTE — PROGRESS NOTE ADULT - ASSESSMENT
75 year old male with CKD, sp polio, paraplegia with associated neurogenic bladder s/p suprapubic catheter who was admitted s/p fall on 2/16.   ESRD, s/p DEYA [place] 2/17 ans started on HD  2/20 s/p RRT for tremors and confusion -- pt with chronic tremors although notably worse  SPC site with chronic/stable inflammatory changes, no drainage - no sign of SSTI  CTA abd with occlusion of b/l external iliac arteries and b/l superficial femoral arteries with distal reconstitution at popliteal arteries; patent three vessel runoff of RLE with 2 vessel runoff of LLE with occlusion of L mid anterior tibial artery with distal reconstitution  Worsening PAD with worsening ischemic changes, necrotic toes, s/p RLE angiogram b/l iliac artery stents by Vascular 3/3    3/16 no tenderness at suprapubic catheter site, no visible erythema at catheter site on exam  UA w/ many epithelial cells, Ucx negative  s/p zosyn 3/14-3/16  3/19 SPC site remains without erythema but now noted with some tan crusting on dressing  Treated for possible SPC site infection with bactrim 3/19-3/25  3/23 reporting vomiting x5 episodes, no diarrhea or other focal sx  3/25 CT Chest noted RLL consolidative opacities, LLL opacities - pneumonia vs atelectasis, few ill defined nodular opacities in LLL infectious vs inflammatory  3/25 CTAP with subcutaneous gas and infiltration tracking along medial R buttock, possibly related to decubitus wound with gas tracking from the external environment, possible necrotizing air forming infection; rectal wall thickening suggestive of proctitis; b/l mild hydroureteronephrosis to level of bladder with no obstruction and diffuse urothelial thickening of b/l renal collecting systems and ureters similar to 2/20  MRSA screen has been negative   no resp symptoms- no cough, dyspnea or pain, may have aspirated with vomiting earlier, saturating well on RA  3/26 s/p bedside debridement of L knee wound  as dry eschar  from wound edges without drainage and bedside excisional debridement of unstageable sacrum to left buttock into perineum evolving unstageable pressure injury through necrotic dermis into nonviable subcutaneous tissues, debulking debridement of necrotic tissue done by Wound care; other wounds noted including L calf to ankle wound with liquefaction necrotic drainage; R ischium wound with moist pink granular tissue and L buttock fading DTI  3/30 overnight febrile to 100.9F ?inflammatory -- no further fevers noted, WBC wnl, was on meropenem   Completed meropenem x10d course on 4/3   Sacral wound with eschar with surrounding red granulation tissue, noted no obv acute infection, no sig purulence, no sig surrounding erythema noted, remains afebrile, WBC wnl, nontoxic appearing   4/8 s/p OR for sacral and thigh wound debridement    -- brief op note reviewed -- [id number] sacral wound with necrotic tissue and rind. Debridement was performed with sharp dissection followed by pulse irrigation. L thigh wounds x2 in posterior thigh s/p debridement with sharp dissection, noted with purulence    -- OR culture with Pseudomonas aeruginosa, CRE-E. coli and VRE-E.faecium - sensitivities reviewed   4/10 patient allowed surgery and wound care teams to change dressings, unable to take prn opiates at times d/t pain  Leukocytosis noted, suspect reactive, remains afebrile, nontoxic appearing     Recommendations:   Follow path report   Surgery and Wound care teams following  Discontinued meropenem  Started on cefepime 1g IV Q24h (renally dosed, on HD days give post HD)  Started on daptomycin 10mg/kg IV Q48h (renally dosed, if on HD day--give post HD)   -check baseline CPK, added on, then check weekly   Monitor temps/WBC  Continue local wound care, nutrition, offloading as able  HD per renal    SPC site dressing change and care   Continue rest of care per primary team       Bridgette Ramirez M.D.  [facility]  Available on [employer] TEAMS - *PREFERRED*  [phone]  After 5pm on weekdays and all day on weekends - please call [phone]     Thank you for consulting us and involving us in the management of this patients case. In addition to reviewing history, imaging, documents, labs, microbiology, took into account antibiotic stewardship, local antibiogram and infection control strategies and potential transmission issues at time of treatment decision making process.

## 2025-04-12 NOTE — PROGRESS NOTE ADULT - PROBLEM SELECTOR PLAN 5
-S/p b/l iliac stent placement on 3/3/25  -Vascular, podiatry f/u    4/12: on antibiotics:  cont current care  and antibiotics:  defer to vascular surgery

## 2025-04-12 NOTE — PROVIDER CONTACT NOTE (CRITICAL VALUE NOTIFICATION) - SITUATION
CBC - clotted
PTT > 200
Received abnormal result from Critical Lab - PO2 - 41
Wound culture- Sacral wound swab- few pseudosmias anginosa, rare E - Coli, numerous enterococcus faecium
Patient had a critical APTT lab vaule of 122.4
troponin : 693

## 2025-04-12 NOTE — PROVIDER CONTACT NOTE (OTHER) - RECOMMENDATIONS
informed PRovider
AS PER MIR Holguin , new labs to be colllected prior to restarting again (when scanned at 1pm, rate was still same as 13/hr. RN had then requested to change order and that's why labs were redrawn.
Provider notified. recommend reeval
Notify provider
asked provider if frequency can be changed to q6hrs because pt cannot wait 8hrs for pain meds
informed provider
informed NP
informed PA

## 2025-04-12 NOTE — PROGRESS NOTE ADULT - PROBLEM SELECTOR PLAN 1
CT chest with new RLL consolidation and LLL opacities, tracheal debris   -Suspect aspiration related s/p episode of vomiting  -S/p ABX as per ID  -Continue bronchodilators  -Keep sats >90% with o2 PRN  -Pt with low grade temp 3/30, dry cough. RVP negative, now afebrile. Continue to monitor fever curve  -Aspiration precautions  -CXR 4/9 grossly clear.    4/12: resolved:  his last cxr is clear:

## 2025-04-12 NOTE — PROGRESS NOTE ADULT - SUBJECTIVE AND OBJECTIVE BOX
Date of Service: 04-12-25 @ 14:39    Patient is a 75y old  Male who presents with a chief complaint of ESRD requiring HD, uremia (12 Apr 2025 08:08)      Any change in ROS: he has lot of pain :  no resp distress:  remains on room air     MEDICATIONS  (STANDING):  aspirin  chewable 81 milliGRAM(s) Oral daily  atorvastatin 40 milliGRAM(s) Oral at bedtime  bacitracin/polymyxin B Ointment 1 Application(s) Topical two times a day  cefepime   IVPB 1000 milliGRAM(s) IV Intermittent every 24 hours  chlorhexidine 2% Cloths 1 Application(s) Topical daily  clopidogrel Tablet 75 milliGRAM(s) Oral daily  Dakins Solution - 1/4 Strength 1 Application(s) Topical every 8 hours  DAPTOmycin IVPB 900 milliGRAM(s) IV Intermittent every 48 hours  epoetin aleah-epbx (RETACRIT) Injectable 37759 Unit(s) IV Push <User Schedule>  gabapentin 300 milliGRAM(s) Oral at bedtime  heparin   Injectable 5000 Unit(s) SubCutaneous every 8 hours  hydrOXYzine hydrochloride 25 milliGRAM(s) Oral three times a day  ibuprofen  Tablet. 400 milliGRAM(s) Oral every 12 hours  lactobacillus acidophilus 1 Tablet(s) Oral two times a day with meals  methocarbamol 750 milliGRAM(s) Oral every 8 hours  midodrine. 10 milliGRAM(s) Oral <User Schedule>  mupirocin 2% Ointment 1 Application(s) Topical <User Schedule>  Nephro-margaret 1 Tablet(s) Oral daily  oxyCODONE  ER Tablet 20 milliGRAM(s) Oral every 8 hours  pantoprazole    Tablet 40 milliGRAM(s) Oral before breakfast  polyethylene glycol 3350 17 Gram(s) Oral daily  povidone iodine 10% Solution 1 Application(s) Topical two times a day  senna 2 Tablet(s) Oral at bedtime    MEDICATIONS  (PRN):  benzocaine/menthol Lozenge 1 Lozenge Oral three times a day PRN Sore Throat  bisacodyl 5 milliGRAM(s) Oral every 12 hours PRN Constipation  HYDROmorphone  Injectable 1 milliGRAM(s) IV Push every 6 hours PRN severe breakthrough pain  oxyCODONE    IR 10 milliGRAM(s) Oral every 4 hours PRN Severe Pain (7 - 10)  sodium chloride 0.9% lock flush 10 milliLiter(s) IV Push every 1 hour PRN Pre/post blood products, medications, blood draw, and to maintain line patency    Vital Signs Last 24 Hrs  T(C): 36.7 (12 Apr 2025 07:14), Max: 36.7 (11 Apr 2025 20:42)  T(F): 98.1 (12 Apr 2025 07:14), Max: 98.1 (12 Apr 2025 07:14)  HR: 80 (12 Apr 2025 07:14) (70 - 81)  BP: 92/57 (12 Apr 2025 07:14) (92/57 - 110/50)  BP(mean): --  RR: 18 (12 Apr 2025 07:14) (18 - 18)  SpO2: 94% (12 Apr 2025 07:14) (92% - 94%)    Parameters below as of 12 Apr 2025 07:14  Patient On (Oxygen Delivery Method): room air        I&O's Summary    11 Apr 2025 07:01  -  12 Apr 2025 07:00  --------------------------------------------------------  IN: 0 mL / OUT: 100 mL / NET: -100 mL          Physical Exam:   GENERAL: NAD, well-groomed, well-developed  HEENT: VINNY/   Atraumatic, Normocephalic  ENMT: No tonsillar erythema, exudates, or enlargement; Moist mucous membranes, Good dentition, No lesions  NECK: Supple, No JVD, Normal thyroid  CHEST/LUNG: Clear to auscultaion- no wheezing  CVS: Regular rate and rhythm; No murmurs, rubs, or gallops  GI: : Soft, Nontender, Nondistended; Bowel sounds present  NERVOUS SYSTEM:  Alert & Oriented X3  EXTREMITIES:left ankle edema:  with gangrenous toes:    LYMPH: No lymphadenopathy noted  SKIN: No rashes or lesions  ENDOCRINOLOGY: No Thyromegaly  PSYCH: Appropriate    Labs:                              10.0   9.32  )-----------( 277      ( 12 Apr 2025 10:10 )             33.9                         9.5    11.15 )-----------( 325      ( 11 Apr 2025 09:03 )             31.3                         9.7    8.84  )-----------( 278      ( 10 Apr 2025 09:30 )             32.8     04-12    135  |  99  |  19  ----------------------------<  90  4.0   |  23  |  2.74[H]  04-11    132[L]  |  96  |  29[H]  ----------------------------<  83  4.0   |  24  |  3.43[H]  04-10    136  |  99  |  24[H]  ----------------------------<  64[L]  3.9   |  25  |  2.92[H]  04-09    134[L]  |  96  |  43[H]  ----------------------------<  56[L]  4.5   |  22  |  3.72[H]    Ca    7.3[L]      12 Apr 2025 10:10  Ca    7.3[L]      11 Apr 2025 09:03      CAPILLARY BLOOD GLUCOSE              Urinalysis Basic - ( 12 Apr 2025 10:10 )    Color: x / Appearance: x / SG: x / pH: x  Gluc: 90 mg/dL / Ketone: x  / Bili: x / Urobili: x   Blood: x / Protein: x / Nitrite: x   Leuk Esterase: x / RBC: x / WBC x   Sq Epi: x / Non Sq Epi: x / Bacteria: x            RECENT CULTURES:  04-08 @ 20:58 Other Sacral Wound Swab   PRIMITIVO      Pseudomonas aeruginosa  Escherichia coli (Carbapenem Resistant)  Enterococcus faecium (vancomycin resistant)  Pseudomonas aeruginosa     Few Pseudomonas aeruginosa  Rare Escherichia coli (Carbapenem Resistant)  Numerous Enterococcus faecium (vancomycin resistant)        rad< from: Xray Chest 1 View- PORTABLE-Urgent (Xray Chest 1 View- PORTABLE-Urgent .) (04.09.25 @ 17:11) >      INTERPRETATION:  EXAMINATION: XR CHEST URGENT    CLINICAL INDICATION: r/o pneumonia    TECHNIQUE: Single frontal, portable view of the chest was obtained.    COMPARISON: Chest x-ray 3/23/2025.    FINDINGS:  Right IJ central venous catheter terminating in the cavoatrial junction.  Elevated right hemidiaphragm.  The heart is unchanged in size.  The lungs are clear.  There is no pneumothorax or pleural effusion.    IMPRESSION:  Clear lungs.    --- End of Report ---          KENN CARDENAS MD; Resident Radiologist  This document has been electronically signed.  CLARENCE MATHEWS MD; Attending Radiologist  This document hasbeen electronically signed. Apr 10 2025 11:53AM    < end of copied text >  < from: CT Chest w/ IV Cont (03.25.25 @ 13:43) >  *  Right lower lobe consolidative opacities, which may represent   pneumonia. Additional consolidative opacities in the left lower lobe,   potentially atelectasis or infection. A few ill-defined nodular opacities   in the left lower lobe measuring up to 6 mm may also be   infectious/inflammatory in etiology. Suggest continued attention on   follow-up imaging.  *  Small amount of debris in the trachea.  *  Small left pleural effusion.  *  Linear nonocclusive filling defect within the right internal jugular   vein, which may represent residual fibrin sheath or nonocclusive thrombus.    < end of copied text >    RESPIRATORY CULTURES:      rad    Studies  Chest X-RAY  CT SCAN Chest   Venous Dopplers: LE:   CT Abdomen  Others

## 2025-04-12 NOTE — PROGRESS NOTE ADULT - PROBLEM SELECTOR PLAN 2
-CXR with elevated R hemidiaphragm (not present on CXR in December 2024)  -CT chest with LLL, RLL GGO, mucoid impacted airways. No clear PNA  -Suggest Duoneb q6h  -S/p Mucomyst x5 days   -Incentive spirometry   -CXR 3/1 with improved R hemidiaphragm, low lung volumes on L  -CXR 4/9 grossly clear.    4/12:  his last ct scan chest suggest old fibrin sheath in rt Int jugular vein : or non occlusive thrombus:  would suggest to do USG of rigth IJV

## 2025-04-12 NOTE — PROVIDER CONTACT NOTE (CRITICAL VALUE NOTIFICATION) - ASSESSMENT
pt is stable
Patient had a critical APTT lab vaule of 122.4
Pt. resting in bed on rounds
Pt is A&Ox4, bedbound, no distress,

## 2025-04-12 NOTE — PROGRESS NOTE ADULT - ASSESSMENT
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 He is well known to me from multiple recent admisions  s/p admissions at [facility] 12/20-12/25/24 and 2/4-2/7 with SONDRA on CKD with associated uremic symptoms.   At the last admission he had expressed strong interest to try to hold off with HD intiation but he has been getting worse clinically at home.  His SONDRA and uremic symptoms significantly improved after the first admission; they improved a to mild extent during the 2nd admission to the point where he was able to be discharged without HD. Since then, however, he has worsened further.   He has been suffering from progressively worsening diffuse pruritus, loss of appetite, and generalized weakness and falls.   His nephrologist and PCP has been speaking with him and his family over the past few days,   The initial plan was that he would present to the [facility] ER Monday 2/17 (ie tomorrow) for HD initiation. Today, however, he fell and sustained trauma to his knee. Given the fall and concern for knee fracture, he presented to the ER today. multiple xrays show no Fractures.      CKD5, uremia, requiring initiation of HD  Rash, seborrheic dermatitis  Pruritis  Anemia  PAD  SP catheter, chronic  Left inferior pole acute on chronic patella Fx    plan  - HD via R subclavian permacath  -4/12: ongoing severe pain at sacral decubiti, wound care done by nursing as per wound care and surgery recs. ABx changed to Daptomycin and Cefepime. Meropenem was DCed  - 4/11:  ptn is awake, alert, has pain in LE, sacral pain is controlled. after a conversation about pain management and his history re debility starting in youth, ptn asked me to pay in the bed w him. i felt uncomfortable and excused myself. wife at the bedside. ptn is AxOx4. i told him the comment was not appropriate and the ptn laughed it off and asked why. as far as wound care, ptn has been refusing to have dressing changed post op. he has fecal incontinence.   -4/10:  pain is controlled, on Hemal for P. aeruginosa growing in sacral decub debridement Cx.ptn is DNR/DNI, was seen by palliaitive care. being followed by pain for pain management in LE and 2/2 sacral decubiti.  awaiting dressing change by surgical team.   -4/9: s/p debridement in OR by general surgery. pain consult and palliative consult reviewed. pain regimen reviewed w pain team.  they d/w ptn and daughter. ptn refused for the dressing to be changed by surgical team today. they tried several times. wound care post op is very important in healing process. meropenem restarted  -4/8: ptn is awaiting debridement in the OR today, debulking and wound care at the bedside has been challenging 2/2 pain not allowing 2/2 pain and fear of pain  - 4/7: ptn complaining his pain meds are not covering his pain completely. yesterday he was talking about death a lot and how he wished to die. had psych eval today. today he is doing better emotionally, not talking about drath and wants to cont treatment. he is tolerating HD, he finished ABx for PNA last week. his wounds are the primary reason of admission at this point.  I also called palliaitive consult.   He was seen by surgery for debridement of sacral decub, ptn is refusing diverting colostomy. on admission ptn had intact skin but did show primary stages of pressure injury in the sacral region. ptn didnt allow us to turn him 2/2 pain in LLE for several days, despite our concern of further pressure injury on the sacrum. once he was turned we discovered skin break and started wound care. again he would not allow us to turn him. he said the wound is ok, its scabbed, Dee Dee( his wife) is handling it. I called wound care to see him, wound care discovered that the area had necrosis, not scabs, debulking at the bedside was performed, ptn complained it was extremely painful. ptn was seen by wound care attending and recommended debridement in the OR under anesthesia. it is planned for 4/9. it was also recommended he should have a diverting colostomy. ptn is refusing it. HCP his daughter Yanique aware.   -4/6: spoke w ptn's daughter Yanique on the phone today x 20 min, spoke to ptn , his wife and daughter yanique at the bedside x 60 min today. ptn is agreeing to debridement in the OR, adamantly refusing diverting colostomy he would consider it if post debridement he would not improve and would require another debridement. ptn was seen by urology and ID bc daughter complained increased amount of "pus" at the SPT, both ID and urology feel there is no acute infection/cellulitis, the drainage at the site and expected and not unusual. daughter and wife state ptn didnt have sacral wound when he came. on admission with the aide of his wife we flipped him and he had 1st degree pressure decubiti sacrally, skin was intact.   - 4/5: thorough eval by wound care attending 4/4/25, case d/w wound care attending Dr Dugan. as per her recommendation placed general surgery consult for OR debridement and diverting colostomy. ptn had sacral decubiti prior to admission  -4/4: dr dugan from wound care had an extensive conversation w the ptn and daughter yanique on the phone at bedside. ptn needs general surgery consult for OR debridement of necrotic tissue of the sacral decubiti and diverting colostomy. ptn did not tolerate debridement at the bedside. . completed Abx for PNA  -4/3: ptn's insurance changed to straight medicare/medicaid. now able to go to [facility], but ptn wants to go home. will need equipment set up. this was d/w CM and ACP, daughter Yanique on the phone and wife at bedside today. today is last day of ABx. will transition pain meds to po DIlaudid from IV, will also need outptn pain management F/U, outptn wound care, home PT, need outptn HD set up  -4/2: seen prior to going to HD, no new events. tomorrow completing 10 day course of Meropenem. ongoing need for narcotics 2/2 pain.   -4/1: ptn is frustrated about a prolonged hospitalization but states he feels better overall and once medically cleared he will be ready to go home, not sooner than 4/7. completing ABx on 4/3.   -3/31: at HD today had 1.7 L UF, post HD was hypotense, gave gentle IVF , BP still a bit low. afebrile, loose BMs but not diarrhea, GI PCR not sent, RVP neg. . on Meropenem course for total of 10 days, last day 4/3, would care for decubiti and left knee wound post trauma  -3/30: Tmax 100.9, c/o dry cough and diarrhea but not watery. pain is controlled at the time of visit. wife at bedside. will check RVP panel, GI PCR, sine diarrhea non watery will not order C. Diff. ID to follow  - 3/29: cont meropenem, seen by coverage  - 3/28: pain from decubitus ulcer and Left knee post debridement continues. wound care recs in place. on Meropenem for PNA, pulm and ID following. HD as per renal. HDS  -3/27: ptn is complaining that pain post decubital and left knee debridement has been severe. his daughter is on speaker phone, wife is at bedside. his pain is controlled when meds are administered. he is very uncomfortable due to the pain at the site of decubitus debridement. lyrica helped his pain before but made him drowsy, griffin with gabapentin. he doesnt want them to be resumed. will cont dilaudid, oxycodone, oxycontin, ibuprofen. he is on Meropenem for aspiration PNA/HCAP. [facility] for DVT ppx. seen by vascular cardiology to address R IJ post shiley non occlusive DVT. full AC was not recommended. will check rpt doppler in 3-4 weeks.   - 3/26: Wound care performed bedside debridement of Left knee wound 2/2 lifted eschar, and sacral decubitus. findings c/w possible infection and mainly fat necrosis. cont Meropenem. ID following. podiatry following for gangrenous toes. will give additional single dose IV DIlaudid 2/2 severe pain post debridement.    - 3/25: wife at bedside, daughter on speaker phone at bedside. ptn is pain free at the time of visit and states he wants to cont the pain regiment he is presently on. ct C/A/P revealed: RLL PNA, prob aspiration, stercoral proctitis, decubitus ulcer w possible progression to sacral OM, R IJ nonocclusive DVT. these findings d/w ptn ,his family, wound care, ACP, Vascular, Nephrology, ID, pUlm, GI. meropenem initiated, Bactrim stopped. ptn states when he is cleared for DC, he wants to go home , not to [facility], not to SNF. daughter and wife aware.   -3/24: ptn  was seen by GI for N/V, its not clear the etiology, will obtain Ct C/A/P. ptn states he is coughing up green mucus as well  -3/23:  ptn is in good spirits, says he vomited "spit" this am, but tolerated all meals without vomiting. no abd pain, no undigested food in the vomit. afebrile, no diarrhea, RVP neg. GI consulted.   -3/21-22: ptn feels well.  pain is controlled. still no accepting facility for [facility]  -3/20: ptn states he has severe low back and LLE pain though overall is improving.   -3/19: ptn w tan crusting around SPC , states its painful. started on po Bactrim, renally dosed by ID for cellulitis.   -3/18: LLE paraesthesias are chronic, will d/w CM re dc planning to [facility]    -3/17: ptn states he is lethargic, he has LLE numbness which is new and severe pain at SPC insertion site. this was ID, d/w neuro, renal and vascular. as per ID: no sign of an acute infection recommend CT A/P.   -3/16:  urine cx from 3/15 NTD, zosyn DCed, awaiting dc to [facility], not sure will be able to go to [facility] since there is an insurance coverage conflict. HD as per renal  - 3/15: spoke w ptn's daughter today re denial from [facility] for [facility]. [facility], which [facility] doesnt accept. i recommended to speak  to Cm and to the insurance company to find participating facilities. ptn is stable today. pain and erythema at SP catheter site has resolved today. seen by ID, will cont ZOSYN for now. UCx sent.   - 3/14: suprapubic tube changhed by BETTY, ptn has crusting at the insertion site and pain. will start Abx, urine always has sediment, will sent for cx, start Vanco/ZOsyn. ID consult called. check MRSA/MSSA PCR  -3/13: ptn is doing well. ptn has an accepting rehab facility, now pending AUTH.   -3/12: ptn is no longer constipated. doing well off prn IV DIlaudid. awaiting dc to [facility]  -3/11: ptn has no new c/o other than feeling constipated, lactulose ordered. also in preparation for [facility] will dc prn IV DIlaudid, cont prn OXy IR  -3/10:  ptn is awake, alert, wife at bedside, i instructed them to give rehab choices. also awaiting SPC change to be done by urology. pain is controlled  -3/9: seen by PT, will refer to [facility]. choices to be given in AM. this was d/w ptn, daughter, wife.   -3/8:  ptn didnt want to get PT eval done, will reorder. ptn needs [facility] placement. PMNR consult ordered  - 3/7: ptn is alert , pain is controlled, DC planning d/w ptn and HCP, daughter Yanique  -3/6:  ptn is awake, alert, ecchymotic feet look improved, pain is controlled. DC planning to [facility]  3/4-5 - s/p b/l iliac arteries angioplasty and stent placements on 3/3. today has new ecchymoses in b/l LE, concern for arterial insufficiency. vascular aware.. wound from Left knee immobilizer is dressed and healing. pain is controlled.   LEFT UE AVF placement/scheduling will be on outptn basis as per vascular. dc planning to [facility]  - 3/3: ptn is s/p angiogram RLE : b/l iliac arteries w successful angioplasty and stents. in PACU. awaitng HD.  ptn has a pressure wound from the LLE immobilizer posteriorly proximal to the knee. its been on 2/2 knee fracture, applied by ortho and managed by ptn's wife as per ptn's and wife's request , this was d/w nursing and nurse manager ms Ruiz.. immobilizer should be managed by orthopedics and nursing. will keep it off, only keep on when repositioning for care and then remove. wound care to F/U . this was discussed at length w daughter Yanique and wife Dee Dee.   -3/1-3/2: ptn is smiling, pain free, had HD 2/28 and 3/1, awaiting RLE angiogram 3/3, on Heparin drip, euvolemic  -2/28: ptn is lethargic, awaiting RLE angiogram possible fem-fem bypass hopefully on 3/3 pending OR availability, awaiting HD today    -2/27:  plan for angiogram in am with possible angioplasty, possible stent, possible fem-fem bypass. medically cleared for angiogram and possible surgery, stent, angioplasty. pain is controlled. remains on heparin drip as per vascular. HD as per renal    -2/26:  ptn is sleeping, pain is controlled, he was seen by wound care and vascular attending. planning on angiogram 2/2 severe PAD in LE on CTA. he also spoke to HCP. ptn and HCP in agreement. ptn was started on HEPARIN drip as per vascular recs. RIJ permacath done 2/25    - 2/25: ptn states he is hallucinating, but not at present, his MS is at baseline, his pain is controlled, he still needs prn pain meds when he is moved in the bed or for testing or for HD. awaiting Permacath, AVF creation, LE bypass to be d/w Dr. Swenson and the ptn tomorrow. ptn has cardiology clearance  if he opts for. Ptn has sacral decubiti and posterior thigh and buttock decibiti and b/l heel decubiti. Z flow fabienne d/w RN, get wound care consult    -2/24: pain is controlled  if the LLE is immobile and fully extended. doesn't tolerate the knee immobilizer. has a medium condyle and acute on chronic patella fx in LEFT knee. as per vascular ptn will need iliac stent  w a fem-fem bypass, possible axillo-femoral bypass. for this surgery he would need cardiac work up . more pressing surgery is LUE AVF creation,  in IR will arrange for Permacath. for pain control: Motrin 400 mg q12H, Oxy ER 30 mg q12H, Oxy IR 10 for mod pain and dilaudid 2 mg iv for severe pain. still has pruritis though improved, will raise atarax 25 mg to tid. plan of care d/w daughter Norma Persaud , ptn and his wife. Also d/w renal, card, vascular, ACP    -2/23: Daughter Yanique is the HCP, she and the ptn filled out the paperwork. daily plan and findings d/w her and the ptn. MS is at abseline, c/o b/l LE foot pain and Left Knee pain. xrays of left knee: cannot r/o acute on chronic inferior pole patellar Fx. knee immobilizer is on, awaiting ortho consult. doubt needs any intervention awaiting Ct chest today, will add CT Left knee. ptn states itching has recurred, severe pain has recurred. will resume Atatrax ( 25 mg bid) and Oxycodone ER , but at a lower dose 15 mg q12H. cont prn analgesics. HD as per renal, awaiting Vascular consult f/u re CTA A/L &LE and recs. ptn also wants AVF surgery on this admission. Coughing has stopped    - 2/22: ptn is drowsy but arousable, calmer today, answers questions appropriately. he is pain free, he stopped coughing, he denies having pruritis: will DC:  OXY ER, Atarax, Tessalon perles.     -  2/21: ptn is tearful, a bit confused, coughing, recognizes me and family at bedside. GOC d/w daughter and wife. AMS prob delirium 2/2 acute illness +/- opiods, lyrica, atarac. will lower atarak to tid, dc lyrica, cont Oxy 2/2 ptn has severe LE pain. neuro called for eval. Head ct done yesterday w no acute findings. CTA A/P w LE run fof: severe PAD, vascular to follow up on plan fo care. plan for HD tomorrow and next week place on tiw schedule of MWF. will need tunneled catheter prior to DC and will d/w vascular scheduling of AVF. ptn wants all to be done while inptn. daughter wants to be HCP as per ptn's wishes. she was given paperwork to get it signed and witnessed and will present to nursing thereafter. details of findings and complaints and plan of care d/w daughter and wife. spent 60 min. RVP ordered. start mucinex , tessalon perles, duonebs, get CT chest to r/o PNA. pulm called    -  2/20:  ptn had RRT 2/2 AMS and tremors. no SZ, no LOC. noted to have Na 123( hyponatremia), given 500 cc NS. Gabapentin DCed. ptn had been taking gabapentin at home PTA. Pain is controlled. Pruritis resolved, tolerating HD otherwise.     - Pain management:  cont Oxycodone 10 IR q6H,  DIlaudid prn severe pain 2 mg q4H prn.   - cont outptn meds  - DVT ppx w HSC

## 2025-04-12 NOTE — PROGRESS NOTE ADULT - SUBJECTIVE AND OBJECTIVE BOX
Patient is a 75y old  Male who presents with a chief complaint of ESRD requiring HD, uremia (12 Apr 2025 08:08)      SUBJECTIVE / OVERNIGHT EVENTS: ongoing severe pain at sacral decubiti, wound care done by nursing as per wound care and surgery recs. ABx changed to Daptomycin and Cefepime. Meropenem was DCed    MEDICATIONS  (STANDING):  aspirin  chewable 81 milliGRAM(s) Oral daily  atorvastatin 40 milliGRAM(s) Oral at bedtime  bacitracin/polymyxin B Ointment 1 Application(s) Topical two times a day  cefepime   IVPB 1000 milliGRAM(s) IV Intermittent every 24 hours  chlorhexidine 2% Cloths 1 Application(s) Topical daily  clopidogrel Tablet 75 milliGRAM(s) Oral daily  Dakins Solution - 1/4 Strength 1 Application(s) Topical every 8 hours  DAPTOmycin IVPB 900 milliGRAM(s) IV Intermittent every 48 hours  epoetin aleah-epbx (RETACRIT) Injectable 70121 Unit(s) IV Push <User Schedule>  gabapentin 300 milliGRAM(s) Oral at bedtime  heparin   Injectable 5000 Unit(s) SubCutaneous every 8 hours  hydrOXYzine hydrochloride 25 milliGRAM(s) Oral three times a day  ibuprofen  Tablet. 400 milliGRAM(s) Oral every 12 hours  lactobacillus acidophilus 1 Tablet(s) Oral two times a day with meals  methocarbamol 750 milliGRAM(s) Oral every 8 hours  midodrine. 10 milliGRAM(s) Oral <User Schedule>  mupirocin 2% Ointment 1 Application(s) Topical <User Schedule>  Nephro-margaret 1 Tablet(s) Oral daily  oxyCODONE  ER Tablet 20 milliGRAM(s) Oral every 8 hours  pantoprazole    Tablet 40 milliGRAM(s) Oral before breakfast  polyethylene glycol 3350 17 Gram(s) Oral daily  povidone iodine 10% Solution 1 Application(s) Topical two times a day  senna 2 Tablet(s) Oral at bedtime    MEDICATIONS  (PRN):  benzocaine/menthol Lozenge 1 Lozenge Oral three times a day PRN Sore Throat  bisacodyl 5 milliGRAM(s) Oral every 12 hours PRN Constipation  HYDROmorphone  Injectable 1 milliGRAM(s) IV Push every 6 hours PRN severe breakthrough pain  oxyCODONE    IR 10 milliGRAM(s) Oral every 4 hours PRN Severe Pain (7 - 10)  sodium chloride 0.9% lock flush 10 milliLiter(s) IV Push every 1 hour PRN Pre/post blood products, medications, blood draw, and to maintain line patency      Vital Signs Last 24 Hrs  T(F): 98.1 (04-12-25 @ 07:14), Max: 98.1 (04-12-25 @ 07:14)  HR: 80 (04-12-25 @ 07:14) (70 - 81)  BP: 92/57 (04-12-25 @ 07:14) (92/57 - 110/50)  RR: 18 (04-12-25 @ 07:14) (18 - 18)  SpO2: 94% (04-12-25 @ 07:14) (92% - 94%)  Telemetry:   CAPILLARY BLOOD GLUCOSE        I&O's Summary    11 Apr 2025 07:01  -  12 Apr 2025 07:00  --------------------------------------------------------  IN: 0 mL / OUT: 100 mL / NET: -100 mL        PHYSICAL EXAM:  GENERAL: NAD, well-developed  HEAD:  Atraumatic, Normocephalic  EYES: EOMI, PERRLA, conjunctiva and sclera clear  NECK: Supple, No JVD  CHEST/LUNG: Clear to auscultation bilaterally; No wheeze  HEART: Regular rate and rhythm; No murmurs, rubs, or gallops  ABDOMEN: Soft, Nontender, Nondistended; Bowel sounds present  EXTREMITIES:  2+ Peripheral Pulses, No clubbing, cyanosis, or edema  PSYCH: AAOx3  NEUROLOGY: non-focal  SKIN: No rashes or lesions    LABS:                        10.0   9.32  )-----------( 277      ( 12 Apr 2025 10:10 )             33.9     04-12    135  |  99  |  19  ----------------------------<  90  4.0   |  23  |  2.74[H]    Ca    7.3[L]      12 Apr 2025 10:10            Urinalysis Basic - ( 12 Apr 2025 10:10 )    Color: x / Appearance: x / SG: x / pH: x  Gluc: 90 mg/dL / Ketone: x  / Bili: x / Urobili: x   Blood: x / Protein: x / Nitrite: x   Leuk Esterase: x / RBC: x / WBC x   Sq Epi: x / Non Sq Epi: x / Bacteria: x        RADIOLOGY & ADDITIONAL TESTS:    Imaging Personally Reviewed:    Consultant(s) Notes Reviewed:      Care Discussed with Consultants/Other Providers:

## 2025-04-12 NOTE — CHART NOTE - NSCHARTNOTEFT_GEN_A_CORE
Notified by RN that patient was received from prior unit with multiple undressed wounds on sacrum/bilateral lower extremities. Reportedly no wound care performed in last 2 days (patient intermittently refusing?). Per nursing and spouse at bedside concern for new left thigh and left hip wounds not previously addressed. Concern for worsening wounds including post-debridement sacral wound. D/w Dr. Martinez, surgery dressing change and eval of surgical wound requested this evening, wound care re-eval in AM to r/o new wounds. Endorsed to attending and night ACP.      Po Freire PA-C  [facility] Department of Medicine  Reachable on [place] Teams

## 2025-04-12 NOTE — PROGRESS NOTE ADULT - SUBJECTIVE AND OBJECTIVE BOX
[facility]  SABINO Griffin Y. Patel, S. Shah, G. Casimir  [phone]  ([phone] - weekdays after 5pm and weekends)    Name: BRIAN DEMPSEY  Age/Gender: 75y Male  MRN: [id number]    Interval History:  Patient seen and examined this morning.   Complains of pain at wound areas.   Denies fever or any other complaints.   Notes reviewed, for HD today.   No concerning overnight events  Afebrile. RN at bedside   Allergies: No Known Allergies      Objective:  Vitals:   T(F): 98.1 (04-12-25 @ 07:14), Max: 98.1 (04-12-25 @ 07:14)  HR: 80 (04-12-25 @ 07:14) (70 - 81)  BP: 92/57 (04-12-25 @ 07:14) (92/57 - 110/50)  RR: 18 (04-12-25 @ 07:14) (18 - 18)  SpO2: 94% (04-12-25 @ 07:14) (92% - 94%)  Physical Examination:  General: no acute distress, nontoxic  HEENT: normocephalic, atraumatic, anicteric  Respiratory: no acc muscle use, breathing comfortably  Cardiovascular: S1 and S2 present  Gastrointestinal: normal appearing, nondistended  Extremities: b/l necrotic toes     Laboratory Studies:  CBC:                       10.0   9.32  )-----------( 277      ( 12 Apr 2025 10:10 )             33.9     WBC Trend:  9.32 04-12-25 @ 10:10  11.15 04-11-25 @ 09:03  8.84 04-10-25 @ 09:30  7.14 04-08-25 @ 07:34  7.32 04-07-25 @ 08:46  6.03 04-06-25 @ 09:05    CMP: 04-12    135  |  99  |  19  ----------------------------<  90  4.0   |  23  |  2.74[H]    Ca    7.3[L]      12 Apr 2025 10:10    Creatinine: 2.74 mg/dL (04-12-25 @ 10:10)  Creatinine: 3.43 mg/dL (04-11-25 @ 09:03)  Creatinine: 2.92 mg/dL (04-10-25 @ 09:30)  Creatinine: 3.72 mg/dL (04-09-25 @ 09:41)  Creatinine: 3.13 mg/dL (04-08-25 @ 07:34)  Creatinine: 3.94 mg/dL (04-07-25 @ 08:46)  Creatinine: 3.36 mg/dL (04-06-25 @ 09:05)    Microbiology: reviewed   Culture - Fungal, Other (collected 04-08-25 @ 20:58)  Source: Other Sacral Wound Swab  Preliminary Report (04-09-25 @ 08:09):    Testing in progress    Culture - Acid Fast - Other w/Smear (collected 04-08-25 @ 20:58)  Source: Other Sacral Wound Swab    Culture - Wound Aerobic (collected 04-08-25 @ 20:58)  Source: Other Sacral Wound Swab  Final Report (04-12-25 @ 01:17):    Few Pseudomonas aeruginosa    Rare Escherichia coli (Carbapenem Resistant)    Numerous Enterococcus faecium (vancomycin resistant)  Organism: Pseudomonas aeruginosa  Escherichia coli (Carbapenem Resistant)  Enterococcus faecium (vancomycin resistant) (04-12-25 @ 01:17)  Organism: Escherichia coli (Carbapenem Resistant) (04-12-25 @ 01:17)      -  Levofloxacin: R >4      -  Tobramycin: S <=2      -  Ceftazidime/Avibactam: S <=4      -  Aztreonam: R >16      -  Gentamicin: S <=2      -  Cefazolin: R >16      -  Cefepime: SDD 4 The breakpoint for susceptible dose dependent may require the usage of a higher cefepime dose (equivalent to adult dose of 2g q8h for normal renal function) for successful treatment.      -  Piperacillin/Tazobactam: R 32      -  Ciprofloxacin: R >2      -  Imipenem: I 2      -  Ceftriaxone: R 32      -  Ampicillin: R >16 These ampicillin results predict results for amoxicillin      Method Type: PRIMITIVO      -  Meropenem: S <=1      -  Ampicillin/Sulbactam: R >16/8      -  Amoxicillin/Clavulanic Acid: R >16/8      -  Trimethoprim/Sulfamethoxazole: S 1/19      -  Ceftolozane/tazobactam: I 4      -  Ertapenem: I 1  Organism: Escherichia coli (Carbapenem Resistant) (04-12-25 @ 01:17)      -  Resistance Gene to Carbapenem: Detec      Method Type: CarbaR      -  Resistance Gene KPC: Detec  Organism: Enterococcus faecium (vancomycin resistant) (04-12-25 @ 01:17)      -  Daptomycin: SDD 2 The breakpoint for SDD (susceptible dose dependent)is based on a dosage regimen of 8-12 mg/kg administered every 24 h in adults and is intended for serious infections due to E. faecium. Consultation with an infectious diseases specialist is recommended.      -  Linezolid: S <=1      -  Vancomycin: R >16      -  Ampicillin: R >8 Predicts results to ampicillin/sulbactam, amoxacillin-clavulanate and  piperacillin-tazobactam.      Method Type: PRIMITIVO  Organism: Pseudomonas aeruginosa (04-12-25 @ 01:17)      -  Levofloxacin: R >4      -  Aztreonam: R >16      -  Cefepime: S 8      -  Piperacillin/Tazobactam: S 16      -  Ciprofloxacin: R >2      -  Imipenem: S <=1      Method Type: PRIMITIVO      -  Meropenem: S <=1      -  Ceftazidime: S 8    Radiology: reviewed     Medications:  aspirin  chewable 81 milliGRAM(s) Oral daily  atorvastatin 40 milliGRAM(s) Oral at bedtime  bacitracin/polymyxin B Ointment 1 Application(s) Topical two times a day  benzocaine/menthol Lozenge 1 Lozenge Oral three times a day PRN  bisacodyl 5 milliGRAM(s) Oral every 12 hours PRN  chlorhexidine 2% Cloths 1 Application(s) Topical daily  clopidogrel Tablet 75 milliGRAM(s) Oral daily  Dakins Solution - 1/4 Strength 1 Application(s) Topical every 8 hours  epoetin aleah-epbx (RETACRIT) Injectable 19928 Unit(s) IV Push <User Schedule>  gabapentin 300 milliGRAM(s) Oral at bedtime  heparin   Injectable 5000 Unit(s) SubCutaneous every 8 hours  HYDROmorphone  Injectable 1 milliGRAM(s) IV Push every 6 hours PRN  hydrOXYzine hydrochloride 25 milliGRAM(s) Oral three times a day  ibuprofen  Tablet. 400 milliGRAM(s) Oral every 12 hours  lactobacillus acidophilus 1 Tablet(s) Oral two times a day with meals  meropenem  IVPB 500 milliGRAM(s) IV Intermittent every 24 hours  methocarbamol 750 milliGRAM(s) Oral every 8 hours  midodrine. 10 milliGRAM(s) Oral <User Schedule>  mupirocin 2% Ointment 1 Application(s) Topical <User Schedule>  Nephro-margaret 1 Tablet(s) Oral daily  oxyCODONE    IR 10 milliGRAM(s) Oral every 4 hours PRN  oxyCODONE  ER Tablet 20 milliGRAM(s) Oral every 8 hours  pantoprazole    Tablet 40 milliGRAM(s) Oral before breakfast  polyethylene glycol 3350 17 Gram(s) Oral daily  povidone iodine 10% Solution 1 Application(s) Topical two times a day  senna 2 Tablet(s) Oral at bedtime  sodium chloride 0.9% lock flush 10 milliLiter(s) IV Push every 1 hour PRN    Current Antimicrobials:  meropenem  IVPB 500 milliGRAM(s) IV Intermittent every 24 hours    Prior/Completed Antimicrobials:  piperacillin/tazobactam IVPB.  piperacillin/tazobactam IVPB.  piperacillin/tazobactam IVPB.-  piperacillin/tazobactam IVPB.-  trimethoprim   80 mG/sulfamethoxazole 400 mG  trimethoprim  160 mG/sulfamethoxazole 800 mG  vancomycin  IVPB  vancomycin  IVPB

## 2025-04-12 NOTE — PROVIDER CONTACT NOTE (CRITICAL VALUE NOTIFICATION) - TEST AND RESULT REPORTED:
troponin : 693
Wound culture  specimen result
PTT > 200
ABG
CBC - clotted
Patient had a critical APTT lab vaule of 122.4

## 2025-04-12 NOTE — PROGRESS NOTE ADULT - ASSESSMENT
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 The initial plan was that he would present to the [facility] ER Monday 2/17 for HD initiation. However, day of admission, he fell and sustained trauma to his knee. Called to consult for cough, elevated R hemidiaphragm.

## 2025-04-13 NOTE — PROGRESS NOTE ADULT - ASSESSMENT
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 He is well known to me from multiple recent admisions  s/p admissions at [facility] 12/20-12/25/24 and 2/4-2/7 with SONDRA on CKD with associated uremic symptoms.   At the last admission he had expressed strong interest to try to hold off with HD intiation but he has been getting worse clinically at home.  His SONDRA and uremic symptoms significantly improved after the first admission; they improved a to mild extent during the 2nd admission to the point where he was able to be discharged without HD. Since then, however, he has worsened further.   He has been suffering from progressively worsening diffuse pruritus, loss of appetite, and generalized weakness and falls.   His nephrologist and PCP has been speaking with him and his family over the past few days,   The initial plan was that he would present to the [facility] ER Monday 2/17 (ie tomorrow) for HD initiation. Today, however, he fell and sustained trauma to his knee. Given the fall and concern for knee fracture, he presented to the ER today. multiple xrays show no Fractures.      CKD5, uremia, requiring initiation of HD  Rash, seborrheic dermatitis  Pruritis  Anemia  PAD  SP catheter, chronic  Left inferior pole acute on chronic patella Fx    plan  - HD via R subclavian permacath  -4/13: ptn is in good spirits, he was transferred 2/2 requiring an isolation room 2/2 growing VRE, CR E.coli, in addition to P.aeruginosa in sacral wound. wound dressing changed last night and this am, pain is controlled. wife at bedside. i reiterated to the ptn and the wife that refusing dressing changes will put him back and the wound will get worse. the ptn understands and stated he will not refuse dressing changes in the future.   -4/12: ongoing severe pain at sacral decubiti, wound care done by nursing as per wound care and surgery recs. ABx changed to Daptomycin and Cefepime. Meropenem was DCed  - 4/11:  ptn is awake, alert, has pain in LE, sacral pain is controlled. after a conversation about pain management and his history re debility starting in youth, ptn asked me to pay in the bed w him. i felt uncomfortable and excused myself. wife at the bedside. ptn is AxOx4. i told him the comment was not appropriate and the ptn laughed it off and asked why. as far as wound care, ptn has been refusing to have dressing changed post op. he has fecal incontinence.   -4/10:  pain is controlled, on Hemal for P. aeruginosa growing in sacral decub debridement Cx.ptn is DNR/DNI, was seen by palliaitive care. being followed by pain for pain management in LE and 2/2 sacral decubiti.  awaiting dressing change by surgical team.   -4/9: s/p debridement in OR by general surgery. pain consult and palliative consult reviewed. pain regimen reviewed w pain team.  they d/w ptn and daughter. ptn refused for the dressing to be changed by surgical team today. they tried several times. wound care post op is very important in healing process. meropenem restarted  -4/8: ptn is awaiting debridement in the OR today, debulking and wound care at the bedside has been challenging 2/2 pain not allowing 2/2 pain and fear of pain  - 4/7: ptn complaining his pain meds are not covering his pain completely. yesterday he was talking about death a lot and how he wished to die. had psych eval today. today he is doing better emotionally, not talking about drath and wants to cont treatment. he is tolerating HD, he finished ABx for PNA last week. his wounds are the primary reason of admission at this point.  I also called palliaitive consult.   He was seen by surgery for debridement of sacral decub, ptn is refusing diverting colostomy. on admission ptn had intact skin but did show primary stages of pressure injury in the sacral region. ptn didnt allow us to turn him 2/2 pain in LLE for several days, despite our concern of further pressure injury on the sacrum. once he was turned we discovered skin break and started wound care. again he would not allow us to turn him. he said the wound is ok, its scabbed, Dee Dee( his wife) is handling it. I called wound care to see him, wound care discovered that the area had necrosis, not scabs, debulking at the bedside was performed, ptn complained it was extremely painful. ptn was seen by wound care attending and recommended debridement in the OR under anesthesia. it is planned for 4/9. it was also recommended he should have a diverting colostomy. ptn is refusing it. HCP his daughter Yanique aware.   -4/6: spoke w ptn's daughter Yanique on the phone today x 20 min, spoke to ptn , his wife and daughter yanique at the bedside x 60 min today. ptn is agreeing to debridement in the OR, adamantly refusing diverting colostomy he would consider it if post debridement he would not improve and would require another debridement. ptn was seen by urology and ID bc daughter complained increased amount of "pus" at the SPT, both ID and urology feel there is no acute infection/cellulitis, the drainage at the site and expected and not unusual. daughter and wife state ptn didnt have sacral wound when he came. on admission with the aide of his wife we flipped him and he had 1st degree pressure decubiti sacrally, skin was intact.   - 4/5: thorough eval by wound care attending 4/4/25, case d/w wound care attending Dr Dugan. as per her recommendation placed general surgery consult for OR debridement and diverting colostomy. ptn had sacral decubiti prior to admission  -4/4: dr dugan from wound care had an extensive conversation w the ptn and daughter yanique on the phone at bedside. ptn needs general surgery consult for OR debridement of necrotic tissue of the sacral decubiti and diverting colostomy. ptn did not tolerate debridement at the bedside. . completed Abx for PNA  -4/3: ptn's insurance changed to straight medicare/medicaid. now able to go to [facility], but ptn wants to go home. will need equipment set up. this was d/w CM and ACP, daughter Yanique on the phone and wife at bedside today. today is last day of ABx. will transition pain meds to po DIlaudid from IV, will also need outptn pain management F/U, outptn wound care, home PT, need outptn HD set up  -4/2: seen prior to going to HD, no new events. tomorrow completing 10 day course of Meropenem. ongoing need for narcotics 2/2 pain.   -4/1: ptn is frustrated about a prolonged hospitalization but states he feels better overall and once medically cleared he will be ready to go home, not sooner than 4/7. completing ABx on 4/3.   -3/31: at HD today had 1.7 L UF, post HD was hypotense, gave gentle IVF , BP still a bit low. afebrile, loose BMs but not diarrhea, GI PCR not sent, RVP neg. . on Meropenem course for total of 10 days, last day 4/3, would care for decubiti and left knee wound post trauma  -3/30: Tmax 100.9, c/o dry cough and diarrhea but not watery. pain is controlled at the time of visit. wife at bedside. will check RVP panel, GI PCR, sine diarrhea non watery will not order C. Diff. ID to follow  - 3/29: cont meropenem, seen by coverage  - 3/28: pain from decubitus ulcer and Left knee post debridement continues. wound care recs in place. on Meropenem for PNA, pulm and ID following. HD as per renal. HDS  -3/27: ptn is complaining that pain post decubital and left knee debridement has been severe. his daughter is on speaker phone, wife is at bedside. his pain is controlled when meds are administered. he is very uncomfortable due to the pain at the site of decubitus debridement. lyrica helped his pain before but made him drowsy, griffin with gabapentin. he doesnt want them to be resumed. will cont dilaudid, oxycodone, oxycontin, ibuprofen. he is on Meropenem for aspiration PNA/HCAP. [facility] [place]. seen by vascular cardiology to address R IJ post shiley non occlusive DVT. full AC was not recommended. will check rpt doppler in 3-4 weeks.   - 3/26: Wound care performed bedside debridement of Left knee wound 2/2 lifted eschar, and sacral decubitus. findings c/w possible infection and mainly fat necrosis. cont Meropenem. ID following. podiatry following for gangrenous toes. will give additional single dose IV DIlaudid 2/2 severe pain post debridement.    - 3/25: wife at bedside, daughter on speaker phone at bedside. ptn is pain free at the time of visit and states he wants to cont the pain regiment he is presently on. ct C/A/P revealed: RLL PNA, prob aspiration, stercoral proctitis, decubitus ulcer w possible progression to sacral OM, R IJ nonocclusive DVT. these findings d/w ptn ,his family, wound care, ACP, Vascular, Nephrology, ID, pUlm, GI. meropenem initiated, Bactrim stopped. ptn states when he is cleared for DC, he wants to go home , not to [facility], not to SNF. daughter and wife aware.   -3/24: ptn  was seen by GI for N/V, its not clear the etiology, will obtain Ct C/A/P. ptn states he is coughing up green mucus as well  -3/23:  ptn is in good spirits, says he vomited "spit" this am, but tolerated all meals without vomiting. no abd pain, no undigested food in the vomit. afebrile, no diarrhea, RVP neg. GI consulted.   -3/21-22: ptn feels well.  pain is controlled. still no accepting facility for [facility]  -3/20: ptn states he has severe low back and LLE pain though overall is improving.   -3/19: ptn w tan crusting around SPC , states its painful. started on po Bactrim, renally dosed by ID for cellulitis.   -3/18: LLE paraesthesias are chronic, will d/w CM re dc planning to [facility]    -3/17: ptn states he is lethargic, he has LLE numbness which is new and severe pain at SPC insertion site. this was ID, d/w neuro, renal and vascular. as per ID: no sign of an acute infection recommend CT A/P.   -3/16:  urine cx from 3/15 [facility], zosyn DCed, awaiting dc to [facility], not sure will be able to go to [facility] since there is an insurance coverage conflict. HD as per renal  - 3/15: spoke w ptn's daughter today re denial from [facility] for [facility]. ptn has [employer] medicare advantage plan, which [facility] doesnt accept. i recommended to speak  to Cm and to the insurance company to find participating facilities. ptn is stable today. pain and erythema at SP catheter site has resolved today. seen by ID, will cont ZOSYN for now. UCx sent.   - 3/14: suprapubic tube changhed by BETTY, ptn has crusting at the insertion site and pain. will start Abx, urine always has sediment, will sent for cx, start Vanco/ZOsyn. ID consult called. check MRSA/MSSA PCR  -3/13: ptn is doing well. ptn has an accepting rehab facility, now pending AUTH.   -3/12: ptn is no longer constipated. doing well off prn IV DIlaudid. awaiting dc to [facility]  -3/11: ptn has no new c/o other than feeling constipated, lactulose ordered. also in preparation for [facility] will dc prn IV DIlaudid, cont prn OXy IR  -3/10:  ptn is awake, alert, wife at bedside, i instructed them to give rehab choices. also awaiting SPC change to be done by urology. pain is controlled  -3/9: seen by PT, will refer to [facility]. choices to be given in AM. this was d/w ptn, daughter, wife.   -3/8:  ptn didnt want to get PT eval done, will reorder. ptn needs [facility] placement. PMNR consult ordered  - 3/7: ptn is alert , pain is controlled, DC planning d/w ptn and HCP, daughter Yanique  -3/6:  ptn is awake, alert, ecchymotic feet look improved, pain is controlled. DC planning to [facility]  3/4-5 - s/p b/l iliac arteries angioplasty and stent placements on 3/3. today has new ecchymoses in b/l LE, concern for arterial insufficiency. vascular aware.. wound from Left knee immobilizer is dressed and healing. pain is controlled.   LEFT UE AVF placement/scheduling will be on outptn basis as per vascular. dc planning to [facility]  - 3/3: ptn is s/p angiogram RLE : b/l iliac arteries w successful angioplasty and stents. in PACU. awaitng HD.  ptn has a pressure wound from the LLE immobilizer posteriorly proximal to the knee. its been on 2/2 knee fracture, applied by ortho and managed by ptn's wife as per ptn's and wife's request , this was d/w nursing and nurse manager ms Ruiz.. immobilizer should be managed by orthopedics and nursing. will keep it off, only keep on when repositioning for care and then remove. wound care to F/U . this was discussed at length w daughter Yanique and wife De eDee.   -3/1-3/2: ptn is smiling, pain free, had HD 2/28 and 3/1, awaiting RLE angiogram 3/3, on Heparin drip, euvolemic  -2/28: ptn is lethargic, awaiting RLE angiogram possible fem-fem bypass hopefully on 3/3 pending OR availability, awaiting HD today    -2/27:  plan for angiogram in am with possible angioplasty, possible stent, possible fem-fem bypass. medically cleared for angiogram and possible surgery, stent, angioplasty. pain is controlled. remains on heparin drip as per vascular. HD as per renal    -2/26:  ptn is sleeping, pain is controlled, he was seen by wound care and vascular attending. planning on angiogram 2/2 severe PAD in LE on CTA. he also spoke to HCP. ptn and HCP in agreement. ptn was started on HEPARIN drip as per vascular recs. RIJ permacath done 2/25    - 2/25: ptn states he is hallucinating, but not at present, his MS is at baseline, his pain is controlled, he still needs prn pain meds when he is moved in the bed or for testing or for HD. awaiting Permacath, AVF creation, LE bypass to be d/w Dr. Swenson and the ptn tomorrow. ptn has cardiology clearance  if he opts for. Ptn has sacral decubiti and posterior thigh and buttock decibiti and b/l heel decubiti. Z flow bootie d/w RN, get wound care consult    -2/24: pain is controlled  if the LLE is immobile and fully extended. doesn't tolerate the knee immobilizer. has a medium condyle and acute on chronic patella fx in LEFT knee. as per vascular ptn will need iliac stent  w a fem-fem bypass, possible axillo-femoral bypass. for this surgery he would need cardiac work up . more pressing surgery is LUE AVF creation,  in IR will arrange for Permacath. for pain control: Motrin 400 mg q12H, Oxy ER 30 mg q12H, Oxy IR 10 for mod pain and dilaudid 2 mg iv for severe pain. still has pruritis though improved, will raise atarax 25 mg to tid. plan of care d/w daughter Norma Persaud , ptn and his wife. Also d/w renal, card, vascular, ACP    -2/23: Daughter Yanique is the HCP, she and the ptn filled out the paperwork. daily plan and findings d/w her and the ptn. MS is at abseline, c/o b/l LE foot pain and Left Knee pain. xrays of left knee: cannot r/o acute on chronic inferior pole patellar Fx. knee immobilizer is on, awaiting ortho consult. doubt needs any intervention awaiting Ct chest today, will add CT Left knee. ptn states itching has recurred, severe pain has recurred. will resume Atatrax ( 25 mg bid) and Oxycodone ER , but at a lower dose 15 mg q12H. cont prn analgesics. HD as per renal, awaiting Vascular consult f/u re CTA A/L &LE and recs. ptn also wants AVF surgery on this admission. Coughing has stopped    - 2/22: ptn is drowsy but arousable, calmer today, answers questions appropriately. he is pain free, he stopped coughing, he denies having pruritis: will DC:  OXY ER, Atarax, Tessalon perles.     -  2/21: ptn is tearful, a bit confused, coughing, recognizes me and family at bedside. GOC d/w daughter and wife. AMS prob delirium 2/2 acute illness +/- opiods, lyrica, atarac. will lower atarak to tid, dc lyrica, cont Oxy 2/2 ptn has severe LE pain. neuro called for eval. Head ct done yesterday w no acute findings. CTA A/P w LE run fof: severe PAD, vascular to follow up on plan fo care. plan for HD tomorrow and next week place on tiw schedule of MWF. will need tunneled catheter prior to DC and will d/w vascular scheduling of AVF. ptn wants all to be done while inptn. daughter wants to be HCP as per ptn's wishes. she was given paperwork to get it signed and witnessed and will present to nursing thereafter. details of findings and complaints and plan of care d/w daughter and wife. spent 60 min. RVP ordered. start mucinex , tessalon perles, duonebs, get CT chest to r/o PNA. pulm called    -  2/20:  ptn had RRT 2/2 AMS and tremors. no SZ, no LOC. noted to have Na 123( hyponatremia), given 500 cc NS. Gabapentin DCed. ptn had been taking gabapentin at home PTA. Pain is controlled. Pruritis resolved, tolerating HD otherwise.     - Pain management:  cont Oxycodone 10 IR q6H,  DIlaudid prn severe pain 2 mg q4H prn.   - cont outptn meds  - DVT ppx w [facility]SC

## 2025-04-13 NOTE — PROGRESS NOTE ADULT - ASSESSMENT
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 Last HD Friday, due tomorrow     (2)Hyponatremia - controlled with high-Na+ bath with HD    (3)Anemia - s/p IV iron; on Retacrit with HD    (4)PAD - s/p LE bypass 3/3/25     (5)CV - tenuous hemodynamics of late to point where he is not able to get opiates at times - best that we cut back on the UF    (6)Pain - in association with sacral ulceration, PAD    (7)ID - now on IV cefapime and dapto    RECOMMEND:   (1)Next HD tomorrow - no net UF   (2)Midodrine 10mg po TIW, pre HD    Beryl Darden DNP  [facility]  [phone]

## 2025-04-13 NOTE — PROGRESS NOTE ADULT - SUBJECTIVE AND OBJECTIVE BOX
Patient is a 75y old  Male who presents with a chief complaint of ESRD requiring HD, uremia (13 Apr 2025 14:28)      SUBJECTIVE / OVERNIGHT EVENTS: ptn is in good spirits, he was transferred 2/2 requiring an isolation room 2/2 growing VRE, CR E.coli, in addition to P.aeruginosa i in sacral wound. wound dressing changed last night and this am, pain is controlled. wife at bedside. i reiterated to the ptn and the wife that refusing dressing changes will put him back and the wound will get worse. the ptn understands and stated he will not refuse dressing changes in the future.     MEDICATIONS  (STANDING):  aspirin  chewable 81 milliGRAM(s) Oral daily  atorvastatin 40 milliGRAM(s) Oral at bedtime  bacitracin/polymyxin B Ointment 1 Application(s) Topical two times a day  cefepime   IVPB 1000 milliGRAM(s) IV Intermittent every 24 hours  chlorhexidine 2% Cloths 1 Application(s) Topical daily  clopidogrel Tablet 75 milliGRAM(s) Oral daily  Dakins Solution - 1/4 Strength 1 Application(s) Topical every 8 hours  DAPTOmycin IVPB 900 milliGRAM(s) IV Intermittent every 48 hours  epoetin aleah-epbx (RETACRIT) Injectable 07805 Unit(s) IV Push <User Schedule>  gabapentin 300 milliGRAM(s) Oral at bedtime  heparin   Injectable 5000 Unit(s) SubCutaneous every 8 hours  hydrOXYzine hydrochloride 25 milliGRAM(s) Oral three times a day  ibuprofen  Tablet. 400 milliGRAM(s) Oral every 12 hours  lactobacillus acidophilus 1 Tablet(s) Oral two times a day with meals  methocarbamol 750 milliGRAM(s) Oral every 8 hours  midodrine. 10 milliGRAM(s) Oral <User Schedule>  mupirocin 2% Ointment 1 Application(s) Topical <User Schedule>  Nephro-margaret 1 Tablet(s) Oral daily  oxyCODONE  ER Tablet 20 milliGRAM(s) Oral every 8 hours  pantoprazole    Tablet 40 milliGRAM(s) Oral before breakfast  polyethylene glycol 3350 17 Gram(s) Oral daily  povidone iodine 10% Solution 1 Application(s) Topical two times a day  senna 2 Tablet(s) Oral at bedtime    MEDICATIONS  (PRN):  benzocaine/menthol Lozenge 1 Lozenge Oral three times a day PRN Sore Throat  bisacodyl 5 milliGRAM(s) Oral every 12 hours PRN Constipation  HYDROmorphone  Injectable 1 milliGRAM(s) IV Push every 6 hours PRN severe breakthrough pain  oxyCODONE    IR 10 milliGRAM(s) Oral every 4 hours PRN Severe Pain (7 - 10)  sodium chloride 0.9% lock flush 10 milliLiter(s) IV Push every 1 hour PRN Pre/post blood products, medications, blood draw, and to maintain line patency      Vital Signs Last 24 Hrs  T(F): 98.1 (04-13-25 @ 13:00), Max: 98.1 (04-13-25 @ 13:00)  HR: 81 (04-13-25 @ 13:00) (81 - 82)  BP: 135/77 (04-13-25 @ 13:00) (111/68 - 140/90)  RR: 18 (04-13-25 @ 13:00) (18 - 18)  SpO2: 98% (04-13-25 @ 13:00) (98% - 99%)  Telemetry:   CAPILLARY BLOOD GLUCOSE        I&O's Summary      PHYSICAL EXAM:  GENERAL: NAD, well-developed  HEAD:  Atraumatic, Normocephalic  EYES: EOMI, PERRLA, conjunctiva and sclera clear  NECK: Supple, No JVD  CHEST/LUNG: Clear to auscultation bilaterally; No wheeze  HEART: Regular rate and rhythm; No murmurs, rubs, or gallops  ABDOMEN: Soft, Nontender, Nondistended; Bowel sounds present  EXTREMITIES:  2+ Peripheral Pulses, No clubbing, cyanosis, or edema  PSYCH: AAOx3  NEUROLOGY: non-focal  SKIN: No rashes or lesions    LABS:                        10.0   9.32  )-----------( 277      ( 12 Apr 2025 10:10 )             33.9     04-12    135  |  99  |  19  ----------------------------<  90  4.0   |  23  |  2.74[H]    Ca    7.3[L]      12 Apr 2025 10:10

## 2025-04-13 NOTE — PROGRESS NOTE ADULT - SUBJECTIVE AND OBJECTIVE BOX
NEPHROLOGY     Patient seen and examined resting comfortably on room air, no sob, last dialyzed Friday with no fluid removal, in no acute distress.     MEDICATIONS  (STANDING):  aspirin  chewable 81 milliGRAM(s) Oral daily  atorvastatin 40 milliGRAM(s) Oral at bedtime  bacitracin/polymyxin B Ointment 1 Application(s) Topical two times a day  cefepime   IVPB 1000 milliGRAM(s) IV Intermittent every 24 hours  chlorhexidine 2% Cloths 1 Application(s) Topical daily  clopidogrel Tablet 75 milliGRAM(s) Oral daily  Dakins Solution - 1/4 Strength 1 Application(s) Topical every 8 hours  DAPTOmycin IVPB 900 milliGRAM(s) IV Intermittent every 48 hours  epoetin aleah-epbx (RETACRIT) Injectable 70342 Unit(s) IV Push <User Schedule>  gabapentin 300 milliGRAM(s) Oral at bedtime  heparin   Injectable 5000 Unit(s) SubCutaneous every 8 hours  hydrOXYzine hydrochloride 25 milliGRAM(s) Oral three times a day  ibuprofen  Tablet. 400 milliGRAM(s) Oral every 12 hours  lactobacillus acidophilus 1 Tablet(s) Oral two times a day with meals  methocarbamol 750 milliGRAM(s) Oral every 8 hours  midodrine. 10 milliGRAM(s) Oral <User Schedule>  mupirocin 2% Ointment 1 Application(s) Topical <User Schedule>  Nephro-margaret 1 Tablet(s) Oral daily  oxyCODONE  ER Tablet 20 milliGRAM(s) Oral every 8 hours  pantoprazole    Tablet 40 milliGRAM(s) Oral before breakfast  polyethylene glycol 3350 17 Gram(s) Oral daily  povidone iodine 10% Solution 1 Application(s) Topical two times a day  senna 2 Tablet(s) Oral at bedtime    VITALS:  T(C): , Max: 36.7 (04-12-25 @ 13:16)  T(F): , Max: 98 (04-12-25 @ 13:16)  HR: 82 (04-13-25 @ 05:00)  BP: 111/68 (04-13-25 @ 05:00)  RR: 18 (04-13-25 @ 05:00)  SpO2: 98% (04-13-25 @ 05:00)    PHYSICAL EXAM:  Constitutional: alert, NAD  HEENT: NCAT, DMM  Neck: Supple, No JVD  Respiratory: CTA-b/l  Cardiovascular: RRR s1s2, no m/r/g  Gastrointestinal: BS+, soft, NT/ND  : (+)suprapubic cath  Extremities: 1+ b/l LE edema  Neurological: reduced generalized strength  Back: no CVAT b/l  Skin: gangrenous changes b/l feet  Access: RIJ tunneled cath    LABS:                        10.0   9.32  )-----------( 277      ( 12 Apr 2025 10:10 )             33.9     04-12    135  |  99  |  19  ----------------------------<  90  4.0   |  23  |  2.74[H]    Ca    7.3[L]      12 Apr 2025 10:10

## 2025-04-13 NOTE — PROGRESS NOTE ADULT - PROBLEM SELECTOR PLAN 2
-CXR with elevated R hemidiaphragm (not present on CXR in December 2024)  -CT chest with LLL, RLL GGO, mucoid impacted airways. No clear PNA  -Suggest Duoneb q6h  -S/p Mucomyst x5 days   -Incentive spirometry   -CXR 3/1 with improved R hemidiaphragm, low lung volumes on L  -CXR 4/9 grossly clear.    4/12:  his last ct scan chest suggest old fibrin sheath in rt Int jugular vein : or non occlusive thrombus:  would suggest to do USG of rigth IJV    4/13: seen by vascular:  no role for ac:  he would need repeat duplex after dc with vascular surgery

## 2025-04-13 NOTE — PROGRESS NOTE ADULT - PROBLEM SELECTOR PLAN 1
CT chest with new RLL consolidation and LLL opacities, tracheal debris   -Suspect aspiration related s/p episode of vomiting  -S/p ABX as per ID  -Continue bronchodilators  -Keep sats >90% with o2 PRN  -Pt with low grade temp 3/30, dry cough. RVP negative, now afebrile. Continue to monitor fever curve  -Aspiration precautions  -CXR 4/9 grossly clear.    4/12: resolved:  his last cxr is clear:  4/13: no new resp symtpoms:  has pain in wound  always on bed:  high risk for pneumonia dw pt and his wife at bedside

## 2025-04-13 NOTE — PROGRESS NOTE ADULT - SUBJECTIVE AND OBJECTIVE BOX
[facility]  SABINO Griffin Y. Patel, S. Shah, G. Casimir  [phone]  ([phone] - weekdays after 5pm and weekends)    Name: BRIAN DEMPSEY  Age/Gender: 75y Male  MRN: [id number]    Interval History:  Patient seen and examined this morning.   No new complaints noted.  Notes reviewed  No concerning overnight events  Afebrile   Allergies: No Known Allergies      Objective:  Vitals:   T(F): 97.7 (04-13-25 @ 05:00), Max: 98 (04-12-25 @ 13:16)  HR: 82 (04-13-25 @ 05:00) (79 - 82)  BP: 111/68 (04-13-25 @ 05:00) (100/63 - 140/90)  RR: 18 (04-13-25 @ 05:00) (18 - 18)  SpO2: 98% (04-13-25 @ 05:00) (96% - 99%)  Physical Examination:  General: no acute distress, nontoxic  HEENT: normocephalic, atraumatic, anicteric  Respiratory: no acc muscle use, breathing comfortably  Cardiovascular: S1 and S2 present  Gastrointestinal: normal appearing, nondistended  Extremities: b/l necrotic toes     Laboratory Studies:  CBC:                       10.0   9.32  )-----------( 277      ( 12 Apr 2025 10:10 )             33.9     WBC Trend:  9.32 04-12-25 @ 10:10  11.15 04-11-25 @ 09:03  8.84 04-10-25 @ 09:30  7.14 04-08-25 @ 07:34  7.32 04-07-25 @ 08:46    CMP: 04-12    135  |  99  |  19  ----------------------------<  90  4.0   |  23  |  2.74[H]    Ca    7.3[L]      12 Apr 2025 10:10      Creatinine: 2.74 mg/dL (04-12-25 @ 10:10)  Creatinine: 3.43 mg/dL (04-11-25 @ 09:03)  Creatinine: 2.92 mg/dL (04-10-25 @ 09:30)  Creatinine: 3.72 mg/dL (04-09-25 @ 09:41)  Creatinine: 3.13 mg/dL (04-08-25 @ 07:34)  Creatinine: 3.94 mg/dL (04-07-25 @ 08:46)    Microbiology: reviewed   Culture - Fungal, Other (collected 04-08-25 @ 20:58)  Source: Other Sacral Wound Swab  Preliminary Report (04-12-25 @ 23:03):    Culture is being performed. Fungal cultures are held for 4 weeks.    Culture - Acid Fast - Other w/Smear (collected 04-08-25 @ 20:58)  Source: Other Sacral Wound Swab  Preliminary Report (04-12-25 @ 23:07):    Culture is being performed.    Culture - Wound Aerobic (collected 04-08-25 @ 20:58)  Source: Other Sacral Wound Swab  Final Report (04-12-25 @ 01:17):    Few Pseudomonas aeruginosa    Rare Escherichia coli (Carbapenem Resistant)    Numerous Enterococcus faecium (vancomycin resistant)  Organism: Pseudomonas aeruginosa  Escherichia coli (Carbapenem Resistant)  Enterococcus faecium (vancomycin resistant) (04-12-25 @ 01:17)  Organism: Escherichia coli (Carbapenem Resistant) (04-12-25 @ 01:17)      -  Levofloxacin: R >4      -  Tobramycin: S <=2      -  Ceftazidime/Avibactam: S <=4      -  Aztreonam: R >16      -  Gentamicin: S <=2      -  Cefazolin: R >16      -  Cefepime: SDD 4 The breakpoint for susceptible dose dependent may require the usage of a higher cefepime dose (equivalent to adult dose of 2g q8h for normal renal function) for successful treatment.      -  Piperacillin/Tazobactam: R 32      -  Ciprofloxacin: R >2      -  Imipenem: I 2      -  Ceftriaxone: R 32      -  Ampicillin: R >16 These ampicillin results predict results for amoxicillin      Method Type: PRIMITIVO      -  Meropenem: S <=1      -  Ampicillin/Sulbactam: R >16/8      -  Amoxicillin/Clavulanic Acid: R >16/8      -  Trimethoprim/Sulfamethoxazole: S 1/19      -  Ceftolozane/tazobactam: I 4      -  Ertapenem: I 1  Organism: Escherichia coli (Carbapenem Resistant) (04-12-25 @ 01:17)      -  Resistance Gene to Carbapenem: Detec      Method Type: CarbaR      -  Resistance Gene KPC: Detec  Organism: Enterococcus faecium (vancomycin resistant) (04-12-25 @ 01:17)      -  Daptomycin: SDD 2 The breakpoint for SDD (susceptible dose dependent)is based on a dosage regimen of 8-12 mg/kg administered every 24 h in adults and is intended for serious infections due to E. faecium. Consultation with an infectious diseases specialist is recommended.      -  Linezolid: S <=1      -  Vancomycin: R >16      -  Ampicillin: R >8 Predicts results to ampicillin/sulbactam, amoxacillin-clavulanate and  piperacillin-tazobactam.      Method Type: PRIMTIIVO  Organism: Pseudomonas aeruginosa (04-12-25 @ 01:17)      -  Levofloxacin: R >4      -  Aztreonam: R >16      -  Cefepime: S 8      -  Piperacillin/Tazobactam: S 16      -  Ciprofloxacin: R >2      -  Imipenem: S <=1      Method Type: PRIMITIVO      -  Meropenem: S <=1      -  Ceftazidime: S 8    Radiology: reviewed     Medications:  aspirin  chewable 81 milliGRAM(s) Oral daily  atorvastatin 40 milliGRAM(s) Oral at bedtime  bacitracin/polymyxin B Ointment 1 Application(s) Topical two times a day  benzocaine/menthol Lozenge 1 Lozenge Oral three times a day PRN  bisacodyl 5 milliGRAM(s) Oral every 12 hours PRN  cefepime   IVPB 1000 milliGRAM(s) IV Intermittent every 24 hours  chlorhexidine 2% Cloths 1 Application(s) Topical daily  clopidogrel Tablet 75 milliGRAM(s) Oral daily  Dakins Solution - 1/4 Strength 1 Application(s) Topical every 8 hours  DAPTOmycin IVPB 900 milliGRAM(s) IV Intermittent every 48 hours  epoetin aleah-epbx (RETACRIT) Injectable 15295 Unit(s) IV Push <User Schedule>  gabapentin 300 milliGRAM(s) Oral at bedtime  heparin   Injectable 5000 Unit(s) SubCutaneous every 8 hours  HYDROmorphone  Injectable 1 milliGRAM(s) IV Push every 6 hours PRN  hydrOXYzine hydrochloride 25 milliGRAM(s) Oral three times a day  ibuprofen  Tablet. 400 milliGRAM(s) Oral every 12 hours  lactobacillus acidophilus 1 Tablet(s) Oral two times a day with meals  methocarbamol 750 milliGRAM(s) Oral every 8 hours  midodrine. 10 milliGRAM(s) Oral <User Schedule>  mupirocin 2% Ointment 1 Application(s) Topical <User Schedule>  Nephro-margaret 1 Tablet(s) Oral daily  oxyCODONE    IR 10 milliGRAM(s) Oral every 4 hours PRN  oxyCODONE  ER Tablet 20 milliGRAM(s) Oral every 8 hours  pantoprazole    Tablet 40 milliGRAM(s) Oral before breakfast  polyethylene glycol 3350 17 Gram(s) Oral daily  povidone iodine 10% Solution 1 Application(s) Topical two times a day  senna 2 Tablet(s) Oral at bedtime  sodium chloride 0.9% lock flush 10 milliLiter(s) IV Push every 1 hour PRN    Current Antimicrobials:  cefepime   IVPB 1000 milliGRAM(s) IV Intermittent every 24 hours  DAPTOmycin IVPB 900 milliGRAM(s) IV Intermittent every 48 hours    Prior/Completed Antimicrobials:  piperacillin/tazobactam IVPB.  piperacillin/tazobactam IVPB.  piperacillin/tazobactam IVPB.-  piperacillin/tazobactam IVPB.-  trimethoprim   80 mG/sulfamethoxazole 400 mG  trimethoprim  160 mG/sulfamethoxazole 800 mG  vancomycin  IVPB  vancomycin  IVPB

## 2025-04-13 NOTE — PROGRESS NOTE ADULT - ASSESSMENT
75 year old male with CKD, sp polio, paraplegia with associated neurogenic bladder s/p suprapubic catheter who was admitted s/p fall on 2/16.   ESRD, s/p DEYA [place] 2/17 ans started on HD  2/20 s/p RRT for tremors and confusion -- pt with chronic tremors although notably worse  SPC site with chronic/stable inflammatory changes, no drainage - no sign of SSTI  CTA abd with occlusion of b/l external iliac arteries and b/l superficial femoral arteries with distal reconstitution at popliteal arteries; patent three vessel runoff of RLE with 2 vessel runoff of LLE with occlusion of L mid anterior tibial artery with distal reconstitution  Worsening PAD with worsening ischemic changes, necrotic toes, s/p RLE angiogram b/l iliac artery stents by Vascular 3/3    3/16 no tenderness at suprapubic catheter site, no visible erythema at catheter site on exam  UA w/ many epithelial cells, Ucx negative  s/p zosyn 3/14-3/16  3/19 SPC site remains without erythema but now noted with some tan crusting on dressing  Treated for possible SPC site infection with bactrim 3/19-3/25  3/23 reporting vomiting x5 episodes, no diarrhea or other focal sx  3/25 CT Chest noted RLL consolidative opacities, LLL opacities - pneumonia vs atelectasis, few ill defined nodular opacities in LLL infectious vs inflammatory  3/25 CTAP with subcutaneous gas and infiltration tracking along medial R buttock, possibly related to decubitus wound with gas tracking from the external environment, possible necrotizing air forming infection; rectal wall thickening suggestive of proctitis; b/l mild hydroureteronephrosis to level of bladder with no obstruction and diffuse urothelial thickening of b/l renal collecting systems and ureters similar to 2/20  MRSA screen has been negative   no resp symptoms- no cough, dyspnea or pain, may have aspirated with vomiting earlier, saturating well on RA  3/26 s/p bedside debridement of L knee wound  as dry eschar  from wound edges without drainage and bedside excisional debridement of unstageable sacrum to left buttock into perineum evolving unstageable pressure injury through necrotic dermis into nonviable subcutaneous tissues, debulking debridement of necrotic tissue done by Wound care; other wounds noted including L calf to ankle wound with liquefaction necrotic drainage; R ischium wound with moist pink granular tissue and L buttock fading DTI  3/30 overnight febrile to 100.9F ?inflammatory -- no further fevers noted, WBC wnl, was on meropenem   Completed meropenem x10d course on 4/3   Sacral wound with eschar with surrounding red granulation tissue, noted no obv acute infection, no sig purulence, no sig surrounding erythema noted, remains afebrile, WBC wnl, nontoxic appearing   4/8 s/p OR for sacral and thigh wound debridement    -- brief op note reviewed -- [id number] sacral wound with necrotic tissue and rind. Debridement was performed with sharp dissection followed by pulse irrigation. L thigh wounds x2 in posterior thigh s/p debridement with sharp dissection, noted with purulence    -- OR culture with Pseudomonas aeruginosa, CRE-E. coli and VRE-E.faecium - sensitivities reviewed   4/10 patient allowed surgery and wound care teams to change dressings, unable to take prn opiates at times d/t pain  Leukocytosis noted, suspect reactive, remains afebrile, nontoxic appearing   s/p meropenem     Recommendations:   Follow path report   Surgery and Wound care teams following  Continue cefepime 1g IV Q24h (renally dosed, on HD days give post HD)  Continue daptomycin 10mg/kg IV Q48h (renally dosed, if on HD day--give post HD)   - baseline CPK wnl, check weekly cpk  Monitor temps/WBC  Continue local wound care, nutrition, offloading as able  HD per renal    SPC site dressing change and care   Continue rest of care per primary team       Bridgette Ramirez M.D.  [facility]  Available on [employer] TEAMS - *PREFERRED*  [phone]  After 5pm on weekdays and all day on weekends - please call [phone]     Thank you for consulting us and involving us in the management of this patients case. In addition to reviewing history, imaging, documents, labs, microbiology, took into account antibiotic stewardship, local antibiogram and infection control strategies and potential transmission issues at time of treatment decision making process.    75 year old male with CKD, sp polio, paraplegia with associated neurogenic bladder s/p suprapubic catheter who was admitted s/p fall on 2/16.   ESRD, s/p DEYA [place] 2/17 ans started on HD  2/20 s/p RRT for tremors and confusion -- pt with chronic tremors although notably worse  SPC site with chronic/stable inflammatory changes, no drainage - no sign of SSTI  CTA abd with occlusion of b/l external iliac arteries and b/l superficial femoral arteries with distal reconstitution at popliteal arteries; patent three vessel runoff of RLE with 2 vessel runoff of LLE with occlusion of L mid anterior tibial artery with distal reconstitution  Worsening PAD with worsening ischemic changes, necrotic toes, s/p RLE angiogram b/l iliac artery stents by Vascular 3/3    3/16 no tenderness at suprapubic catheter site, no visible erythema at catheter site on exam  UA w/ many epithelial cells, Ucx negative  s/p zosyn 3/14-3/16  3/19 SPC site remains without erythema but now noted with some tan crusting on dressing  Treated for possible SPC site infection with bactrim 3/19-3/25  3/23 reporting vomiting x5 episodes, no diarrhea or other focal sx  3/25 CT Chest noted RLL consolidative opacities, LLL opacities - pneumonia vs atelectasis, few ill defined nodular opacities in LLL infectious vs inflammatory  3/25 CTAP with subcutaneous gas and infiltration tracking along medial R buttock, possibly related to decubitus wound with gas tracking from the external environment, possible necrotizing air forming infection; rectal wall thickening suggestive of proctitis; b/l mild hydroureteronephrosis to level of bladder with no obstruction and diffuse urothelial thickening of b/l renal collecting systems and ureters similar to 2/20  MRSA screen has been negative   no resp symptoms- no cough, dyspnea or pain, may have aspirated with vomiting earlier, saturating well on RA  3/26 s/p bedside debridement of L knee wound  as dry eschar  from wound edges without drainage and bedside excisional debridement of unstageable sacrum to left buttock into perineum evolving unstageable pressure injury through necrotic dermis into nonviable subcutaneous tissues, debulking debridement of necrotic tissue done by Wound care; other wounds noted including L calf to ankle wound with liquefaction necrotic drainage; R ischium wound with moist pink granular tissue and L buttock fading DTI  3/30 overnight febrile to 100.9F ?inflammatory -- no further fevers noted, WBC wnl, was on meropenem   Completed meropenem x10d course on 4/3   Sacral wound with eschar with surrounding red granulation tissue, noted no obv acute infection, no sig purulence, no sig surrounding erythema noted, remains afebrile, WBC wnl, nontoxic appearing   4/8 s/p OR for sacral and thigh wound debridement    -- brief op note reviewed -- [id number] sacral wound with necrotic tissue and rind. Debridement was performed with sharp dissection followed by pulse irrigation. L thigh wounds x2 in posterior thigh s/p debridement with sharp dissection, noted with purulence    -- OR culture with Pseudomonas aeruginosa, CRE-E. coli and VRE-E.faecium - sensitivities reviewed   4/10 patient allowed surgery and wound care teams to change dressings, unable to take prn opiates at times d/t pain  Leukocytosis noted, suspect reactive, remains afebrile, nontoxic appearing   s/p meropenem     Recommendations:   Follow path report   Surgery and Wound care teams following  Continue cefepime 1g IV Q24h (renally dosed, on HD days give post HD)  Continue daptomycin 10mg/kg IV Q48h (renally dosed, if on HD day--give post HD)   - baseline CPK wnl, check weekly cpk  Monitor temps/WBC  Continue local wound care, nutrition, offloading as able  HD per renal    SPC site dressing change and care   Contact isolation per IC protocol   Continue rest of care per primary team       Bridgette Ramirez M.D.  [facility]  Available on [employer] TEAMS - *PREFERRED*  [phone]  After 5pm on weekdays and all day on weekends - please call [phone]     Thank you for consulting us and involving us in the management of this patients case. In addition to reviewing history, imaging, documents, labs, microbiology, took into account antibiotic stewardship, local antibiogram and infection control strategies and potential transmission issues at time of treatment decision making process.

## 2025-04-13 NOTE — PROGRESS NOTE ADULT - PROBLEM SELECTOR PLAN 5
-S/p b/l iliac stent placement on 3/3/25  -Vascular, podiatry f/u    4/12: on antibiotics:  cont current care  and antibiotics:  defer to vascular surgery  4/13: cont antibtiocs

## 2025-04-13 NOTE — PROGRESS NOTE ADULT - ASSESSMENT
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 The initial plan was that he would present to the [facility] Monday 2/17 for HD initiation. However, day of admission, he fell and sustained trauma to his knee. Called to consult for cough, elevated R hemidiaphragm.

## 2025-04-13 NOTE — PROGRESS NOTE ADULT - SUBJECTIVE AND OBJECTIVE BOX
Date of Service: 04-13-25 @ 14:28    Patient is a 75y old  Male who presents with a chief complaint of ESRD requiring HD, uremia (13 Apr 2025 10:31)      Any change in ROS: seems same to me:  no cough :   no phlegm  :  recently had pneumonia:  no resp distress:  on room air         MEDICATIONS  (STANDING):  aspirin  chewable 81 milliGRAM(s) Oral daily  atorvastatin 40 milliGRAM(s) Oral at bedtime  bacitracin/polymyxin B Ointment 1 Application(s) Topical two times a day  cefepime   IVPB 1000 milliGRAM(s) IV Intermittent every 24 hours  chlorhexidine 2% Cloths 1 Application(s) Topical daily  clopidogrel Tablet 75 milliGRAM(s) Oral daily  Dakins Solution - 1/4 Strength 1 Application(s) Topical every 8 hours  DAPTOmycin IVPB 900 milliGRAM(s) IV Intermittent every 48 hours  epoetin aleah-epbx (RETACRIT) Injectable 76796 Unit(s) IV Push <User Schedule>  gabapentin 300 milliGRAM(s) Oral at bedtime  heparin   Injectable 5000 Unit(s) SubCutaneous every 8 hours  hydrOXYzine hydrochloride 25 milliGRAM(s) Oral three times a day  ibuprofen  Tablet. 400 milliGRAM(s) Oral every 12 hours  lactobacillus acidophilus 1 Tablet(s) Oral two times a day with meals  methocarbamol 750 milliGRAM(s) Oral every 8 hours  midodrine. 10 milliGRAM(s) Oral <User Schedule>  mupirocin 2% Ointment 1 Application(s) Topical <User Schedule>  Nephro-margaret 1 Tablet(s) Oral daily  oxyCODONE  ER Tablet 20 milliGRAM(s) Oral every 8 hours  pantoprazole    Tablet 40 milliGRAM(s) Oral before breakfast  polyethylene glycol 3350 17 Gram(s) Oral daily  povidone iodine 10% Solution 1 Application(s) Topical two times a day  senna 2 Tablet(s) Oral at bedtime    MEDICATIONS  (PRN):  benzocaine/menthol Lozenge 1 Lozenge Oral three times a day PRN Sore Throat  bisacodyl 5 milliGRAM(s) Oral every 12 hours PRN Constipation  HYDROmorphone  Injectable 1 milliGRAM(s) IV Push every 6 hours PRN severe breakthrough pain  oxyCODONE    IR 10 milliGRAM(s) Oral every 4 hours PRN Severe Pain (7 - 10)  sodium chloride 0.9% lock flush 10 milliLiter(s) IV Push every 1 hour PRN Pre/post blood products, medications, blood draw, and to maintain line patency    Vital Signs Last 24 Hrs  T(C): 36.7 (13 Apr 2025 13:00), Max: 36.7 (12 Apr 2025 20:24)  T(F): 98.1 (13 Apr 2025 13:00), Max: 98.1 (13 Apr 2025 13:00)  HR: 81 (13 Apr 2025 13:00) (81 - 82)  BP: 135/77 (13 Apr 2025 13:00) (111/68 - 140/90)  BP(mean): --  RR: 18 (13 Apr 2025 13:00) (18 - 18)  SpO2: 98% (13 Apr 2025 13:00) (98% - 99%)    Parameters below as of 13 Apr 2025 13:00  Patient On (Oxygen Delivery Method): room air        I&O's Summary        Physical Exam:   GENERAL: NAD, well-groomed, well-developed  HEENT: VINNY/   Atraumatic, Normocephalic  ENMT: No tonsillar erythema, exudates, or enlargement; Moist mucous membranes, Good dentition, No lesions  NECK: Supple, No JVD, Normal thyroid  CHEST/LUNG: Clear to auscultaion  CVS: Regular rate and rhythm; No murmurs, rubs, or gallops  GI: : Soft, Nontender, Nondistended; Bowel sounds present  NERVOUS SYSTEM:  Alert & Oriented X3  EXTREMITIES:  gangrenous digits:  sacral wound   LYMPH: No lymphadenopathy noted  SKIN: No rashes or lesions  ENDOCRINOLOGY: No Thyromegaly  PSYCH: Appropriate    Labs:                              10.0   9.32  )-----------( 277      ( 12 Apr 2025 10:10 )             33.9                         9.5    11.15 )-----------( 325      ( 11 Apr 2025 09:03 )             31.3                         9.7    8.84  )-----------( 278      ( 10 Apr 2025 09:30 )             32.8     04-12    135  |  99  |  19  ----------------------------<  90  4.0   |  23  |  2.74[H]  04-11    132[L]  |  96  |  29[H]  ----------------------------<  83  4.0   |  24  |  3.43[H]  04-10    136  |  99  |  24[H]  ----------------------------<  64[L]  3.9   |  25  |  2.92[H]    Ca    7.3[L]      12 Apr 2025 10:10      CAPILLARY BLOOD GLUCOSE              Urinalysis Basic - ( 12 Apr 2025 10:10 )    Color: x / Appearance: x / SG: x / pH: x  Gluc: 90 mg/dL / Ketone: x  / Bili: x / Urobili: x   Blood: x / Protein: x / Nitrite: x   Leuk Esterase: x / RBC: x / WBC x   Sq Epi: x / Non Sq Epi: x / Bacteria: x            RECENT CULTURES:  04-08 @ 20:58 Other Sacral Wound Swab   PRIMITIVO      Pseudomonas aeruginosa  Escherichia coli (Carbapenem Resistant)  Enterococcus faecium (vancomycin resistant)  Pseudomonas aeruginosa     Few Pseudomonas aeruginosa  Rare Escherichia coli (Carbapenem Resistant)  Numerous Enterococcus faecium (vancomycin resistant)      rad< from: Xray Chest 1 View- PORTABLE-Urgent (Xray Chest 1 View- PORTABLE-Urgent .) (04.09.25 @ 17:11) >  CLINICAL INDICATION: r/o pneumonia    TECHNIQUE: Single frontal, portable view of the chest was obtained.    COMPARISON: Chest x-ray 3/23/2025.    FINDINGS:  Right IJ central venous catheter terminating in the cavoatrial junction.  Elevated right hemidiaphragm.  The heart is unchanged in size.  The lungs are clear.  There is no pneumothorax or pleural effusion.    IMPRESSION:  Clear lungs.    --- End of Report ---          KENN CARDENAS MD; Resident Radiologist  This document has been electronically signed.  CLARENCE MATHEWS MD; Attending Radiologist  This document hasbeen electronically signed. Apr 10 2025 11:53AM    < end of copied text >  < from: CT Abdomen and Pelvis w/ IV Cont (03.25.25 @ 13:43) >  *  Subcutaneous gas and infiltration tracking along the medial right   buttock, potentially related to a decubitus wound with gas tracking from   the external environment. A necrotizing airforming infection is also a   possibility. Correlate with physical exam.  *  Rectal wall thickening, suggestive of proctitis.  *  Bilateral mild hydroureteronephrosis to the level of the urinary   bladder without evidence of obstructive urolithiasis, and not   significantly changed since 2/20/2025.  *  Diffuse urothelial thickening of the bilateral renal collecting   systems and ureters, which may be seen with an ascending urinary tract   infection or inflammation, similar to the previous CT 2/20/2025.    Findings were discussed with Dr. NELL TOLBERT 3/25/2025 4:30 PM by   Dr. Calzada.    < end of copied text >      RESPIRATORY CULTURES:          Studies  Chest X-RAY  CT SCAN Chest   Venous Dopplers: LE:   CT Abdomen  Others

## 2025-04-14 NOTE — PROVIDER CONTACT NOTE (OTHER) - BACKGROUND
Last two sets of Aptt results were therapeutic, so am labs were routine, with the current heparin infusion rate at 13cc/hr upon change of shift. When appt resulted, Bety Weller contacted RE[facility] and
Prior Authorization Not Needed per Insurance    Medication: Humira 80mg Weekly  Insurance Company: [employer] - Phone [phone] Fax [phone]  Expected CoPay:      Pharmacy Filling the Rx: [facility]  Pharmacy Notified:    Patient Notified:        
see H & P
see H & P
pt has complex PMHX, currently on meropenem for PNA
PM mariah
see H & P
see H & P
see h&p
pt has complex past medical hx. admitted for knee pain post fall
see H & P
knee pain s/p fall

## 2025-04-14 NOTE — PROGRESS NOTE ADULT - SUBJECTIVE AND OBJECTIVE BOX
Patient is a 75y old  Male who presents with a chief complaint of ESRD requiring HD, uremia (14 Apr 2025 09:52)      SUBJECTIVE / OVERNIGHT EVENTS: ptn is awaiting to go to HD, extensive conversation w daughter yani about GOC, ptn has new sacral wounds despite getting turned, the present sacaral wound are healing, getting daily dressing changes. daughter wants to talk to palliaitive and wound care re prognosis and GOC meeting. wound care attending made aware to call the daughter, spoke w JARAD Fontenot to place Palliative re-eval re GOC. spoje to yani x 45 min re hospital course and she expressed a possibility of stopping HD and proceed w hospice.     MEDICATIONS  (STANDING):  aspirin  chewable 81 milliGRAM(s) Oral daily  atorvastatin 40 milliGRAM(s) Oral at bedtime  bacitracin/polymyxin B Ointment 1 Application(s) Topical two times a day  cefepime   IVPB 1000 milliGRAM(s) IV Intermittent every 24 hours  chlorhexidine 2% Cloths 1 Application(s) Topical daily  clopidogrel Tablet 75 milliGRAM(s) Oral daily  Dakins Solution - 1/4 Strength 1 Application(s) Topical every 8 hours  DAPTOmycin IVPB 900 milliGRAM(s) IV Intermittent every 48 hours  epoetin aleah-epbx (RETACRIT) Injectable 13335 Unit(s) IV Push <User Schedule>  gabapentin 300 milliGRAM(s) Oral at bedtime  heparin   Injectable 5000 Unit(s) SubCutaneous every 8 hours  hydrOXYzine hydrochloride 25 milliGRAM(s) Oral three times a day  ibuprofen  Tablet. 400 milliGRAM(s) Oral every 12 hours  lactobacillus acidophilus 1 Tablet(s) Oral two times a day with meals  methocarbamol 750 milliGRAM(s) Oral every 8 hours  midodrine. 10 milliGRAM(s) Oral <User Schedule>  mupirocin 2% Ointment 1 Application(s) Topical <User Schedule>  Nephro-margaret 1 Tablet(s) Oral daily  oxyCODONE  ER Tablet 20 milliGRAM(s) Oral every 8 hours  pantoprazole    Tablet 40 milliGRAM(s) Oral before breakfast  polyethylene glycol 3350 17 Gram(s) Oral daily  povidone iodine 10% Solution 1 Application(s) Topical two times a day  senna 2 Tablet(s) Oral at bedtime    MEDICATIONS  (PRN):  benzocaine/menthol Lozenge 1 Lozenge Oral three times a day PRN Sore Throat  bisacodyl 5 milliGRAM(s) Oral every 12 hours PRN Constipation  HYDROmorphone  Injectable 1 milliGRAM(s) IV Push every 6 hours PRN severe breakthrough pain  oxyCODONE    IR 10 milliGRAM(s) Oral every 4 hours PRN Severe Pain (7 - 10)  sodium chloride 0.9% lock flush 10 milliLiter(s) IV Push every 1 hour PRN Pre/post blood products, medications, blood draw, and to maintain line patency      Vital Signs Last 24 Hrs  T(F): 97 (04-14-25 @ 12:38), Max: 98.4 (04-14-25 @ 04:10)  HR: 93 (04-14-25 @ 12:38) (93 - 104)  BP: 120/78 (04-14-25 @ 12:38) (105/80 - 120/78)  RR: 17 (04-14-25 @ 12:38) (17 - 19)  SpO2: 92% (04-14-25 @ 12:38) (92% - 98%)  Telemetry:   CAPILLARY BLOOD GLUCOSE        I&O's Summary      PHYSICAL EXAM:  GENERAL: NAD, well-developed  HEAD:  Atraumatic, Normocephalic  EYES: EOMI, PERRLA, conjunctiva and sclera clear  NECK: Supple, No JVD  CHEST/LUNG: Clear to auscultation bilaterally; No wheeze  HEART: Regular rate and rhythm; No murmurs, rubs, or gallops  ABDOMEN: Soft, Nontender, Nondistended; Bowel sounds present  EXTREMITIES:  2+ Peripheral Pulses, No clubbing, cyanosis, or edema  PSYCH: AAOx3  NEUROLOGY: non-focal  SKIN: No rashes or lesions

## 2025-04-14 NOTE — PROGRESS NOTE ADULT - ASSESSMENT
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 The initial plan was that he would present to the [facility] ER Monday 2/17 for HD initiation. However, day of admission, he fell and sustained trauma to his knee. Called to consult for cough, elevated R hemidiaphragm.

## 2025-04-14 NOTE — PROVIDER CONTACT NOTE (OTHER) - REASON
Pt lungs sound like Rhonchi
PT BP 87/50, HR 82.
Wounds need possible reeval
pt complaining of pain to suprapubic cath site
Low Oxygen
requesting oxy and pt was not due- oxy frequency has to change
Pts BP 96/60
pt hs oral temp 100.9
Aptt resulted this afternoon at 55
administering PO meds prior to procedure
pt complaining of severe nausea
Elevated Aptt (routine lab from am collected by phlebotomy)-result 196.7
has a productive cough
pt in 10/10 pain - requested oxy 10mg
pt refused to be turned to assess his skin
pt requested oxy for pain and a sleeping pill

## 2025-04-14 NOTE — PROVIDER CONTACT NOTE (OTHER) - ACTION/TREATMENT ORDERED:
PA stated to let pt sleep and to check skin in the am.
Provider notified and made aware. Midodrine administered as per providers orders.
Will continue to monitor
provider gave OK to give PRN PO tylenol with ordered parameters
NP came and assessed patient. nebulizer treatment duoneb ordered and given. continue to monitor.
Provider notified. No new nursing interventions at this time. Pt on CPOX, using incentive spirometer as per education.
provider will order a lozenge
provider stated she will order the oxy 10mg x 1 dose
Awaiting results in order to restart RELL toro Discussed with Kimmy manager on unit today.
Provider aware, administering PO medications, holding ibuprofen as requested by provider.
provider notified and made aware.
NP stated she will order the oxy and Lidoderm patch for pain. she will order melatonin for sleep
Provider notified. Pt asymptomatic. Will continue to monitor.
provider ordered 1x dose IVP zofran
provider stated will give a one time dose of oxy 5mg

## 2025-04-14 NOTE — RAPID RESPONSE TEAM SUMMARY - NSADDTLFINDINGSRRT_GEN_ALL_CORE
RRT called for altered mental status. Upon arrival, vitals: /107, , RR 20.  . Pt initially not responsive but once became responsive after being sternal rubbed. Pt answering questions appropriately immediately, annoyed at the presence of rapid response team. Pt was vitally stable at conclusion of rapid

## 2025-04-14 NOTE — PROGRESS NOTE ADULT - SUBJECTIVE AND OBJECTIVE BOX
Overnight events noted      VITAL:  T(C): , Max: 36.9 (04-14-25 @ 04:10)  T(F): , Max: 98.4 (04-14-25 @ 04:10)  HR: 104 (04-14-25 @ 04:30)  BP: 118/58 (04-14-25 @ 04:30)  BP(mean): --  RR: 18 (04-14-25 @ 04:30)  SpO2: 98% (04-14-25 @ 04:30)  Wt(kg): --      PHYSICAL EXAM:  Constitutional: alert, NAD  HEENT: NCAT, DMM  Neck: Supple, No JVD  Respiratory: CTA-b/l  Cardiovascular: RRR s1s2, no m/r/g  Gastrointestinal: BS+, soft, NT/ND  : (+)suprapubic cath  Extremities: 1+ b/l LE edema  Neurological: reduced generalized strength  Back: no CVAT b/l  Skin: gangrenous changes b/l feet  Access: [facility] tunneled cath    LABS:                        10.0   9.32  )-----------( 277      ( 12 Apr 2025 10:10 )             33.9     Na(135)/K(4.0)/Cl(99)/HCO3(23)/BUN(19)/Cr(2.74)Glu(90)/Ca(7.3)/Mg(--)/PO4(--)    04-12 @ 10:10  Na(132)/K(4.0)/Cl(96)/HCO3(24)/BUN(29)/Cr(3.43)Glu(83)/Ca(7.3)/Mg(--)/PO4(--)    04-11 @ 09:03      IMPRESSION: 74M w/ polio, neurogenic bladder/suprapubic catheter, 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 On HD now    (2)Hyponatremia - controlled with high-Na+ bath with HD    (3)Anemia - s/p IV iron; on Retacrit with HD    (4)PAD - s/p LE bypass 3/3/25     (5)CV - tenuous hemodynamics of late to point where he is not able to get opiates at times - best that we cut back on the UF    (6)ID - infected sacral ulcer - s/p debridement late last week; now on IV Cefepime + Dapto      RECOMMEND:   (1)HD today - 0.5kg UF as able; Midodrine pre-HD  (2)Continue abx for GFR<10/HD              Rafita Denny MD  [facility]  Office/on call physician: [phone]  Cell (7a-7p): [phone]       no complaints      VITAL:  T(C): , Max: 36.9 (04-14-25 @ 04:10)  T(F): , Max: 98.4 (04-14-25 @ 04:10)  HR: 104 (04-14-25 @ 04:30)  BP: 118/58 (04-14-25 @ 04:30)  BP(mean): --  RR: 18 (04-14-25 @ 04:30)  SpO2: 98% (04-14-25 @ 04:30)  Wt(kg): --      PHYSICAL EXAM:  Constitutional: alert, NAD  HEENT: NCAT, DMM  Neck: Supple, No JVD  Respiratory: CTA-b/l  Cardiovascular: RRR s1s2, no m/r/g  Gastrointestinal: BS+, soft, NT/ND  : (+)suprapubic cath  Extremities: 1+ b/l LE edema  Neurological: reduced generalized strength  Back: no CVAT b/l  Skin: gangrenous changes b/l feet  Access: [facility] tunneled cath    LABS:                        10.0   9.32  )-----------( 277      ( 12 Apr 2025 10:10 )             33.9     Na(135)/K(4.0)/Cl(99)/HCO3(23)/BUN(19)/Cr(2.74)Glu(90)/Ca(7.3)/Mg(--)/PO4(--)    04-12 @ 10:10  Na(132)/K(4.0)/Cl(96)/HCO3(24)/BUN(29)/Cr(3.43)Glu(83)/Ca(7.3)/Mg(--)/PO4(--)    04-11 @ 09:03      IMPRESSION: 74M w/ polio, neurogenic bladder/suprapubic catheter, 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 On HD now    (2)Hyponatremia - controlled with high-Na+ bath with HD    (3)Anemia - s/p IV iron; on Retacrit with HD    (4)PAD - s/p LE bypass 3/3/25     (5)CV - tenuous hemodynamics of late to point where he is not able to get opiates at times - best that we cut back on the UF    (6)ID - infected sacral ulcer - s/p debridement late last week; now on IV Cefepime + Dapto      RECOMMEND:   (1)HD today - 0.5kg UF as able; Midodrine pre-HD  (2)Continue abx for GFR<10/HD              Rafita Denny MD  [facility]  Office/on call physician: [phone]  Cell (7a-7p): [phone]

## 2025-04-14 NOTE — PROGRESS NOTE ADULT - SUBJECTIVE AND OBJECTIVE BOX
Surgery Progress Note    S: Patient seen and examined. No acute events overnight. Sacral wound and thigh wounds dressing changed by RN yesterday AM.     O:  Physical Exam:  Gen: NAD  Resp: Unlabored breathing    Vital Signs Last 24 Hrs  T(C): 36.8 (14 Apr 2025 04:30), Max: 36.9 (14 Apr 2025 04:10)  T(F): 98.2 (14 Apr 2025 04:30), Max: 98.4 (14 Apr 2025 04:10)  HR: 104 (14 Apr 2025 04:30) (81 - 104)  BP: 118/58 (14 Apr 2025 04:30) (105/80 - 135/77)  BP(mean): --  RR: 18 (14 Apr 2025 04:30) (18 - 19)  SpO2: 98% (14 Apr 2025 04:30) (92% - 98%)    Parameters below as of 14 Apr 2025 04:30  Patient On (Oxygen Delivery Method): room air        I&O's Detail                            10.0   9.32  )-----------( 277      ( 12 Apr 2025 10:10 )             33.9       04-12    135  |  99  |  19  ----------------------------<  90  4.0   |  23  |  2.74[H]    Ca    7.3[L]      12 Apr 2025 10:10

## 2025-04-14 NOTE — PROGRESS NOTE ADULT - ASSESSMENT
75 year old male with CKD, sp polio, paraplegia with associated neurogenic bladder s/p suprapubic catheter who was admitted s/p fall on 2/16.   ESRD, s/p DEYA [place] 2/17 ans started on HD  2/20 s/p RRT for tremors and confusion -- pt with chronic tremors although notably worse  SPC site with chronic/stable inflammatory changes, no drainage - no sign of SSTI  CTA abd with occlusion of b/l external iliac arteries and b/l superficial femoral arteries with distal reconstitution at popliteal arteries; patent three vessel runoff of RLE with 2 vessel runoff of LLE with occlusion of L mid anterior tibial artery with distal reconstitution  Worsening PAD with worsening ischemic changes, necrotic toes, s/p RLE angiogram b/l iliac artery stents by Vascular 3/3    3/16 no tenderness at suprapubic catheter site, no visible erythema at catheter site on exam  UA w/ many epithelial cells, Ucx negative  s/p zosyn 3/14-3/16  3/19 SPC site remains without erythema but now noted with some tan crusting on dressing  Treated for possible SPC site infection with bactrim 3/19-3/25  3/23 reporting vomiting x5 episodes, no diarrhea or other focal sx  3/25 CT Chest noted RLL consolidative opacities, LLL opacities - pneumonia vs atelectasis, few ill defined nodular opacities in LLL infectious vs inflammatory  3/25 CTAP with subcutaneous gas and infiltration tracking along medial R buttock, possibly related to decubitus wound with gas tracking from the external environment, possible necrotizing air forming infection; rectal wall thickening suggestive of proctitis; b/l mild hydroureteronephrosis to level of bladder with no obstruction and diffuse urothelial thickening of b/l renal collecting systems and ureters similar to 2/20  MRSA screen has been negative   no resp symptoms- no cough, dyspnea or pain, may have aspirated with vomiting earlier, saturating well on RA  3/26 s/p bedside debridement of L knee wound  as dry eschar  from wound edges without drainage and bedside excisional debridement of unstageable sacrum to left buttock into perineum evolving unstageable pressure injury through necrotic dermis into nonviable subcutaneous tissues, debulking debridement of necrotic tissue done by Wound care; other wounds noted including L calf to ankle wound with liquefaction necrotic drainage; R ischium wound with moist pink granular tissue and L buttock fading DTI  3/30 overnight febrile to 100.9F ?inflammatory -- no further fevers noted, WBC wnl, was on meropenem   Completed meropenem x10d course on 4/3   Sacral wound with eschar with surrounding red granulation tissue, noted no obv acute infection, no sig purulence, no sig surrounding erythema noted, remains afebrile, WBC wnl, nontoxic appearing   4/8 s/p OR for sacral and thigh wound debridement    -- brief op note reviewed -- [id number] sacral wound with necrotic tissue and rind. Debridement was performed with sharp dissection followed by pulse irrigation. L thigh wounds x2 in posterior thigh s/p debridement with sharp dissection, noted with purulence    -- OR culture with Pseudomonas aeruginosa, CRE-E. coli and VRE-E.faecium - sensitivities reviewed   4/10 patient allowed surgery and wound care teams to change dressings, unable to take prn opiates at times d/t pain  Leukocytosis noted, suspect reactive, remains afebrile, nontoxic appearing   s/p meropenem     Recommendations:   Follow path report   Surgery and Wound care teams following  Continue cefepime 1g IV Q24h (renally dosed, on HD days give post HD)  Continue daptomycin 10mg/kg IV Q48h (renally dosed, if on HD day--give post HD)   - baseline CPK wnl, check weekly cpk  Will plan for prolonged 6 week course and adjust dosing to be given post HD once ready for d/c  Monitor temps/WBC  Continue local wound care, nutrition, offloading as able  HD per renal    SPC site dressing change and care   Contact isolation per IC protocol   Continue rest of care per primary team       Bridgette Ramirez M.D.  [facility]  Available on [employer] TEAMS - *PREFERRED*  [phone]  After 5pm on weekdays and all day on weekends - please call [phone]     Thank you for consulting us and involving us in the management of this patients case. In addition to reviewing history, imaging, documents, labs, microbiology, took into account antibiotic stewardship, local antibiogram and infection control strategies and potential transmission issues at time of treatment decision making process.

## 2025-04-14 NOTE — PROGRESS NOTE ADULT - ASSESSMENT
74 yo M w/ PMHx of multiple comorbidities including polio, chronic pain with multiple sequela including LE weakness requiring wheelchair attributed to polio since 18 months of age, scoliosis, neurogenic bladder s/p suprapubic catheter, and colostomy s/p iatrogenic rectal rupture injury, reversal of colostomy, anemia, erectile dysfunction, multiple fractures including patella and femoral condyle with gianluca placement of the lower extremities, deep venous thrombosis, elevated hemidiaphragm, hyperlipidemia, bacterial sepsis, electrolyte imbalance, shoulder surgery, cataract surgery, tremors, PAD who initially presented to ED after fall from wheelchair found to have ESRD now on HD w progressive wounds. Surgery consulted for evaluation of sacral wound. S/p sacral ulcer and thigh wounds debridement on 4/8.    PLAN:  - Daily dressing changes by nursing (instructions in provider to RN)  - Surgery to sign off, reconsult as needed    Trauma -[id number]

## 2025-04-14 NOTE — PROVIDER CONTACT NOTE (OTHER) - SITUATION
pt complaining of severe nausea
pt in 10/10 pain - requested oxy 10mg
requesting oxy and pt was not due- oxy frequency has to change
Pt complaining of 8/10 pain to suprapubic cath site.
Pt hypotensive. BP 87/50, HR 82.
Pt to be administered AM PO medications
see above
pt requested oxy for pain and a sleeping pill
Am labs delayed in collection as phlebotomy on unit after 7am
Wounds seem to be worsening as per significant other.
pt refused to be turned to assess his skin
Low Oxygen
Pts BP 96/60
has a productive cough

## 2025-04-14 NOTE — PROVIDER CONTACT NOTE (OTHER) - NAME OF MD/NP/PA/DO NOTIFIED:
Alicia Tian
Leila Aguayo
MIR Galicia
PA -Kelle Utuado
Cary Hummel
JENNIFER Pro
MIR Holguin
Pb Salinas
Cary Hummel
Quan Thomas
MIR Holguin
NP Reyes Monegro
Astrid HESTER
Po rasmussen
Juan Glass
Juan Glass

## 2025-04-14 NOTE — PROGRESS NOTE ADULT - ASSESSMENT
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 He is well known to me from multiple recent admisions  s/p admissions at [facility] 12/20-12/25/24 and 2/4-2/7 with SONDRA on CKD with associated uremic symptoms.   At the last admission he had expressed strong interest to try to hold off with HD intiation but he has been getting worse clinically at home.  His SONDRA and uremic symptoms significantly improved after the first admission; they improved a to mild extent during the 2nd admission to the point where he was able to be discharged without HD. Since then, however, he has worsened further.   He has been suffering from progressively worsening diffuse pruritus, loss of appetite, and generalized weakness and falls.   His nephrologist and PCP has been speaking with him and his family over the past few days,   The initial plan was that he would present to the [facility] ER Monday 2/17 (ie tomorrow) for HD initiation. Today, however, he fell and sustained trauma to his knee. Given the fall and concern for knee fracture, he presented to the ER today. multiple xrays show no Fractures.      CKD5, uremia, requiring initiation of HD  Rash, seborrheic dermatitis  Pruritis  Anemia  PAD  SP catheter, chronic  Left inferior pole acute on chronic patella Fx    plan  - HD via R subclavian permacath  -4/14: ptn is awaiting to go to HD, extensive conversation w daughter yanique about GOC, ptn has new sacral wounds despite getting turned, the present sacaral wound are healing, getting daily dressing changes. daughter wants to talk to palliaitive and wound care re prognosis and GOC meeting. wound care attending made aware to call the daughter, spoke w JARAD Fontenot to place Palliative re-eval re GOC. spoje to yanique x 45 min re hospital course and she expressed a possibility of stopping HD and proceed w hospice.   -4/13: ptn is in good spirits, he was transferred 2/2 requiring an isolation room 2/2 growing VRE, CR E.coli, in addition to P.aeruginosa in sacral wound. wound dressing changed last night and this am, pain is controlled. wife at bedside. i reiterated to the ptn and the wife that refusing dressing changes will put him back and the wound will get worse. the ptn understands and stated he will not refuse dressing changes in the future.   -4/12: ongoing severe pain at sacral decubiti, wound care done by nursing as per wound care and surgery recs. ABx changed to Daptomycin and Cefepime. Meropenem was DCed  - 4/11:  ptn is awake, alert, has pain in LE, sacral pain is controlled. after a conversation about pain management and his history re debility starting in youth, ptn asked me to pay in the bed w him. i felt uncomfortable and excused myself. wife at the bedside. ptn is AxOx4. i told him the comment was not appropriate and the ptn laughed it off and asked why. as far as wound care, ptn has been refusing to have dressing changed post op. he has fecal incontinence.   -4/10:  pain is controlled, on Hemal for P. aeruginosa growing in sacral decub debridement Cx.ptn is DNR/DNI, was seen by palliaitive care. being followed by pain for pain management in LE and 2/2 sacral decubiti.  awaiting dressing change by surgical team.   -4/9: s/p debridement in OR by general surgery. pain consult and palliative consult reviewed. pain regimen reviewed w pain team.  they d/w ptn and daughter. ptn refused for the dressing to be changed by surgical team today. they tried several times. wound care post op is very important in healing process. meropenem restarted  -4/8: ptn is awaiting debridement in the OR today, debulking and wound care at the bedside has been challenging 2/2 pain not allowing 2/2 pain and fear of pain  - 4/7: ptn complaining his pain meds are not covering his pain completely. yesterday he was talking about death a lot and how he wished to die. had psych eval today. today he is doing better emotionally, not talking about drath and wants to cont treatment. he is tolerating HD, he finished ABx for PNA last week. his wounds are the primary reason of admission at this point.  I also called palliaitive consult.   He was seen by surgery for debridement of sacral decub, ptn is refusing diverting colostomy. on admission ptn had intact skin but did show primary stages of pressure injury in the sacral region. ptn didnt allow us to turn him 2/2 pain in LLE for several days, despite our concern of further pressure injury on the sacrum. once he was turned we discovered skin break and started wound care. again he would not allow us to turn him. he said the wound is ok, its scabbed, Dee Dee( his wife) is handling it. I called wound care to see him, wound care discovered that the area had necrosis, not scabs, debulking at the bedside was performed, ptn complained it was extremely painful. ptn was seen by wound care attending and recommended debridement in the OR under anesthesia. it is planned for 4/9. it was also recommended he should have a diverting colostomy. ptn is refusing it. HCP his daughter Yanique aware.   -4/6: spoke w ptn's daughter Yanique on the phone today x 20 min, spoke to ptn , his wife and daughter yanique at the bedside x 60 min today. ptn is agreeing to debridement in the OR, adamantly refusing diverting colostomy he would consider it if post debridement he would not improve and would require another debridement. ptn was seen by urology and ID bc daughter complained increased amount of "pus" at the SPT, both ID and urology feel there is no acute infection/cellulitis, the drainage at the site and expected and not unusual. daughter and wife state ptn didnt have sacral wound when he came. on admission with the aide of his wife we flipped him and he had 1st degree pressure decubiti sacrally, skin was intact.   - 4/5: thorough eval by wound care attending 4/4/25, case d/w wound care attending Dr Dugan. as per her recommendation placed general surgery consult for OR debridement and diverting colostomy. ptn had sacral decubiti prior to admission  -4/4: dr dugan from wound care had an extensive conversation w the ptn and daughter yanique on the phone at bedside. ptn needs general surgery consult for OR debridement of necrotic tissue of the sacral decubiti and diverting colostomy. ptn did not tolerate debridement at the bedside. . completed Abx for PNA  -4/3: ptn's insurance changed to straight medicare/medicaid. now able to go to [facility], but ptn wants to go home. will need equipment set up. this was d/w CM and ACP, daughter Yanique on the phone and wife at bedside today. today is last day of ABx. will transition pain meds to po DIlaudid from IV, will also need outptn pain management F/U, outptn wound care, home PT, need outptn HD set up  -4/2: seen prior to going to HD, no new events. tomorrow completing 10 day course of Meropenem. ongoing need for narcotics 2/2 pain.   -4/1: ptn is frustrated about a prolonged hospitalization but states he feels better overall and once medically cleared he will be ready to go home, not sooner than 4/7. completing ABx on 4/3.   -3/31: at HD today had 1.7 L UF, post HD was hypotense, gave gentle IVF , BP still a bit low. afebrile, loose BMs but not diarrhea, GI PCR not sent, RVP neg. . on Meropenem course for total of 10 days, last day 4/3, would care for decubiti and left knee wound post trauma  -3/30: Tmax 100.9, c/o dry cough and diarrhea but not watery. pain is controlled at the time of visit. wife at bedside. will check RVP panel, GI PCR, sine diarrhea non watery will not order C. Diff. ID to follow  - 3/29: cont meropenem, seen by coverage  - 3/28: pain from decubitus ulcer and Left knee post debridement continues. wound care recs in place. on Meropenem for PNA, pulm and ID following. HD as per renal. HDS  -3/27: ptn is complaining that pain post decubital and left knee debridement has been severe. his daughter is on speaker phone, wife is at bedside. his pain is controlled when meds are administered. he is very uncomfortable due to the pain at the site of decubitus debridement. lyrica helped his pain before but made him drowsy, griffin with gabapentin. he doesnt want them to be resumed. will cont dilaudid, oxycodone, oxycontin, ibuprofen. he is on Meropenem for aspiration PNA/HCAP[place] [facility] [place] seen by vascular cardiology to address R IJ post shiley non occlusive DVT. full AC was not recommended. will check rpt doppler in 3-4 weeks.   - 3/26: Wound care performed bedside debridement of Left knee wound 2/2 lifted eschar, and sacral decubitus. findings c/w possible infection and mainly fat necrosis. cont Meropenem. ID following. podiatry following for gangrenous toes. will give additional single dose IV DIlaudid 2/2 severe pain post debridement.    - 3/25: wife at bedside, daughter on speaker phone at bedside. ptn is pain free at the time of visit and states he wants to cont the pain regiment he is presently on. ct C/A/P revealed: RLL PNA, prob aspiration, stercoral proctitis, decubitus ulcer w possible progression to sacral OM, R IJ nonocclusive DVT. these findings d/w ptn ,his family, wound care, ACP, Vascular, Nephrology, ID, pUlm, GI. meropenem initiated, Bactrim stopped. ptn states when he is cleared for DC, he wants to go home , not to [facility], not to SNF. daughter and wife aware.   -3/24: ptn  was seen by GI for N/V, its not clear the etiology, will obtain Ct C/A/P. ptn states he is coughing up green mucus as well  -3/23:  ptn is in good spirits, says he vomited "spit" this am, but tolerated all meals without vomiting. no abd pain, no undigested food in the vomit. afebrile, no diarrhea, RVP neg. GI consulted.   -3/21-22: ptn feels well.  pain is controlled. still no accepting facility for [facility]  -3/20: ptn states he has severe low back and LLE pain though overall is improving.   -3/19: ptn w tan crusting around SPC , states its painful. started on po Bactrim, renally dosed by ID for cellulitis.   -3/18: LLE paraesthesias are chronic, will d/w CM re dc planning to [facility]    -3/17: ptn states he is lethargic, he has LLE numbness which is new and severe pain at SPC insertion site. this was ID, d/w neuro, renal and vascular. as per ID: no sign of an acute infection recommend CT A/P.   -3/16:  urine cx from 3/15 NTD, zosyn DCed, awaiting dc to [facility], not sure will be able to go to [facility] since there is an insurance coverage conflict. HD as per renal  - 3/15: spoke w ptn's daughter today re denial from [facility] for [facility]. ptn has [facility] health medicare advantage plan, which [facility] doesnt accept. i recommended to speak  to Cm and to the insurance company to find participating facilities. ptn is stable today. pain and erythema at SP catheter site has resolved today. seen by ID, will cont ZOSYN for now. UCx sent.   - 3/14: suprapubic tube changhed by , ptn has crusting at the insertion site and pain. will start Abx, urine always has sediment, will sent for cx, start Vanco/ZOsyn. ID consult called. check MRSA/MSSA PCR  -3/13: ptn is doing well. ptn has an accepting rehab facility, now pending AUTH.   -3/12: ptn is no longer constipated. doing well off prn IV DIlaudid. awaiting dc to [facility]  -3/11: ptn has no new c/o other than feeling constipated, lactulose ordered. also in preparation for [facility] will dc prn IV DIlaudid, cont prn OXy IR  -3/10:  ptn is awake, alert, wife at bedside, i instructed them to give rehab choices. also awaiting SPC change to be done by urology. pain is controlled  -3/9: seen by PT, will refer to [facility]. choices to be given in AM. this was d/w ptn, daughter, wife.   -3/8:  ptn didnt want to get PT eval done, will reorder. ptn needs [facility] placement. PMNR consult ordered  - 3/7: ptn is alert , pain is controlled, DC planning d/w ptn and HCP, daughter Yanique  -3/6:  ptn is awake, alert, ecchymotic feet look improved, pain is controlled. DC planning to [facility]  3/4-5 - s/p b/l iliac arteries angioplasty and stent placements on 3/3. today has new ecchymoses in b/l LE, concern for arterial insufficiency. vascular aware.. wound from Left knee immobilizer is dressed and healing. pain is controlled.   LEFT UE AVF placement/scheduling will be on outptn basis as per vascular. dc planning to [facility]  - 3/3: ptn is s/p angiogram RLE : b/l iliac arteries w successful angioplasty and stents. in PACU. awaitng HD.  ptn has a pressure wound from the LLE immobilizer posteriorly proximal to the knee. its been on 2/2 knee fracture, applied by ortho and managed by ptn's wife as per ptn's and wife's request , this was d/w nursing and nurse manager ms Bobmykel.. immobilizer should be managed by orthopedics and nursing. will keep it off, only keep on when repositioning for care and then remove. wound care to F/U . this was discussed at length w daughter Yanique and wife Dee Dee.   -3/1-3/2: ptn is smiling, pain free, had HD 2/28 and 3/1, awaiting RLE angiogram 3/3, on Heparin drip, euvolemic  -2/28: ptn is lethargic, awaiting RLE angiogram possible fem-fem bypass hopefully on 3/3 pending OR availability, awaiting HD today    -2/27:  plan for angiogram in am with possible angioplasty, possible stent, possible fem-fem bypass. medically cleared for angiogram and possible surgery, stent, angioplasty. pain is controlled. remains on heparin drip as per vascular. HD as per renal    -2/26:  ptn is sleeping, pain is controlled, he was seen by wound care and vascular attending. planning on angiogram 2/2 severe PAD in LE on CTA. he also spoke to HCP. ptn and HCP in agreement. ptn was started on HEPARIN drip as per vascular recs. RIJ permacath done 2/25    - 2/25: ptn states he is hallucinating, but not at present, his MS is at baseline, his pain is controlled, he still needs prn pain meds when he is moved in the bed or for testing or for HD. awaiting Permacath, AVF creation, LE bypass to be d/w Dr. Swenson and the ptn tomorrow. ptn has cardiology clearance  if he opts for. Ptn has sacral decubiti and posterior thigh and buttock decibiti and b/l heel decubiti. Z flow bootie d/w RN, get wound care consult    -2/24: pain is controlled  if the LLE is immobile and fully extended. doesn't tolerate the knee immobilizer. has a medium condyle and acute on chronic patella fx in LEFT knee. as per vascular ptn will need iliac stent  w a fem-fem bypass, possible axillo-femoral bypass. for this surgery he would need cardiac work up . more pressing surgery is LUE AVF creation,  in IR will arrange for Permacath. for pain control: Motrin 400 mg q12H, Oxy ER 30 mg q12H, Oxy IR 10 for mod pain and dilaudid 2 mg iv for severe pain. still has pruritis though improved, will raise atarax 25 mg to tid. plan of care d/w daughter Norma Persaud , ptn and his wife. Also d/w renal, card, vascular, ACP    -2/23: Daughter Yanique is the HCP, she and the ptn filled out the paperwork. daily plan and findings d/w her and the ptn. MS is at abseline, c/o b/l LE foot pain and Left Knee pain. xrays of left knee: cannot r/o acute on chronic inferior pole patellar Fx. knee immobilizer is on, awaiting ortho consult. doubt needs any intervention awaiting Ct chest today, will add CT Left knee. ptn states itching has recurred, severe pain has recurred. will resume Atatrax ( 25 mg bid) and Oxycodone ER , but at a lower dose 15 mg q12H. cont prn analgesics. HD as per renal, awaiting Vascular consult f/u re CTA A/L &LE and recs. ptn also wants AVF surgery on this admission. Coughing has stopped    - 2/22: ptn is drowsy but arousable, calmer today, answers questions appropriately. he is pain free, he stopped coughing, he denies having pruritis: will DC:  OXY ER, Atarax, Tessalon perles.     -  2/21: ptn is tearful, a bit confused, coughing, recognizes me and family at bedside. GOC d/w daughter and wife. AMS prob delirium 2/2 acute illness +/- opiods, lyrica, atarac. will lower atarak to tid, dc lyrica, cont Oxy 2/2 ptn has severe LE pain. neuro called for eval. Head ct done yesterday w no acute findings. CTA A/P w LE run fof: severe PAD, vascular to follow up on plan fo care. plan for HD tomorrow and next week place on tiw schedule of MWF. will need tunneled catheter prior to DC and will d/w vascular scheduling of AVF. ptn wants all to be done while inptn. daughter wants to be HCP as per ptn's wishes. she was given paperwork to get it signed and witnessed and will present to nursing thereafter. details of findings and complaints and plan of care d/w daughter and wife. spent 60 min. RVP ordered. start mucinex , tessalon perles, duonebs, get CT chest to r/o PNA. pulm called    -  2/20:  ptn had RRT 2/2 AMS and tremors. no SZ, no LOC. noted to have Na 123( hyponatremia), given 500 cc NS. Gabapentin DCed. ptn had been taking gabapentin at home PTA. Pain is controlled. Pruritis resolved, tolerating HD otherwise.     - Pain management:  cont Oxycodone 10 IR q6H,  DIlaudid prn severe pain 2 mg q4H prn.   - cont outptn meds  - DVT ppx w HSC

## 2025-04-14 NOTE — CHART NOTE - NSCHARTNOTEFT_GEN_A_CORE
newly transferred from [facility] to  for better management of his multiple wounds  s/p RRT on 5ACE for hypotension post HD minutes before being transferred  RRT treated with fluids and midodrine and BP improved  at 915 pm patient noted to have b/l crackles, rhonchi treated with duonebs  at 930 pm, was noted to have difficulty in arousing  normal blood sugar; RRT called by nurse  woke up with normal mental status  chest x ray ordered to evaluate rales/rhonchi ? aspiration as nurse reports wife fed him soup  receiving duonebs  vital signs stable; axo x3   will f/u reading of chest x ray  RRT ended  JENNIFER Pro

## 2025-04-14 NOTE — PROGRESS NOTE ADULT - SUBJECTIVE AND OBJECTIVE BOX
[facility]  SABINO Griffin Y. Patel, S. Shah, G. Casimir  [phone]  ([phone] - weekdays after 5pm and weekends)    Name: BRIAN DEMPSEY  Age/Gender: 75y Male  MRN: [id number]    Interval History:  Patient seen and examined this morning  Resting comfortably.   Notes reviewed  No concerning overnight events  Afebrile   Allergies: No Known Allergies      Objective:  Vitals:   T(F): 98.2 (04-14-25 @ 04:30), Max: 98.4 (04-14-25 @ 04:10)  HR: 104 (04-14-25 @ 04:30) (81 - 104)  BP: 118/58 (04-14-25 @ 04:30) (105/80 - 135/77)  RR: 18 (04-14-25 @ 04:30) (18 - 19)  SpO2: 98% (04-14-25 @ 04:30) (92% - 98%)  Physical Examination:  General: no acute distress, nontoxic  HEENT: normocephalic, atraumatic, anicteric  Respiratory: no acc muscle use, breathing comfortably  Cardiovascular: S1 and S2 present  Gastrointestinal: normal appearing, nondistended  Extremities: b/l necrotic toes     Laboratory Studies:  CBC:   WBC Trend:  9.32 04-12-25 @ 10:10  11.15 04-11-25 @ 09:03  8.84 04-10-25 @ 09:30  7.14 04-08-25 @ 07:34    CMP:   Creatinine: 2.74 mg/dL (04-12-25 @ 10:10)  Creatinine: 3.43 mg/dL (04-11-25 @ 09:03)  Creatinine: 2.92 mg/dL (04-10-25 @ 09:30)  Creatinine: 3.72 mg/dL (04-09-25 @ 09:41)  Creatinine: 3.13 mg/dL (04-08-25 @ 07:34)    Microbiology: reviewed   Culture - Fungal, Other (collected 04-08-25 @ 20:58)  Source: Other Sacral Wound Swab  Preliminary Report (04-12-25 @ 23:03):    Culture is being performed. Fungal cultures are held for 4 weeks.    Culture - Acid Fast - Other w/Smear (collected 04-08-25 @ 20:58)  Source: Other Sacral Wound Swab  Preliminary Report (04-12-25 @ 23:07):    Culture is being performed.    Culture - Wound Aerobic (collected 04-08-25 @ 20:58)  Source: Other Sacral Wound Swab  Final Report (04-12-25 @ 01:17):    Few Pseudomonas aeruginosa    Rare Escherichia coli (Carbapenem Resistant)    Numerous Enterococcus faecium (vancomycin resistant)  Organism: Pseudomonas aeruginosa  Escherichia coli (Carbapenem Resistant)  Enterococcus faecium (vancomycin resistant) (04-12-25 @ 01:17)  Organism: Escherichia coli (Carbapenem Resistant) (04-12-25 @ 01:17)      -  Levofloxacin: R >4      -  Tobramycin: S <=2      -  Ceftazidime/Avibactam: S <=4      -  Aztreonam: R >16      -  Gentamicin: S <=2      -  Cefazolin: R >16      -  Cefepime: SDD 4 The breakpoint for susceptible dose dependent may require the usage of a higher cefepime dose (equivalent to adult dose of 2g q8h for normal renal function) for successful treatment.      -  Piperacillin/Tazobactam: R 32      -  Ciprofloxacin: R >2      -  Imipenem: I 2      -  Ceftriaxone: R 32      -  Ampicillin: R >16 These ampicillin results predict results for amoxicillin      Method Type: PRIMITIVO      -  Meropenem: S <=1      -  Ampicillin/Sulbactam: R >16/8      -  Amoxicillin/Clavulanic Acid: R >16/8      -  Trimethoprim/Sulfamethoxazole: S 1/19      -  Ceftolozane/tazobactam: I 4      -  Ertapenem: I 1  Organism: Escherichia coli (Carbapenem Resistant) (04-12-25 @ 01:17)      -  Resistance Gene to Carbapenem: Detec      Method Type: CarbaR      -  Resistance Gene KPC: Detec  Organism: Enterococcus faecium (vancomycin resistant) (04-12-25 @ 01:17)      -  Daptomycin: SDD 2 The breakpoint for SDD (susceptible dose dependent)is based on a dosage regimen of 8-12 mg/kg administered every 24 h in adults and is intended for serious infections due to E. faecium. Consultation with an infectious diseases specialist is recommended.      -  Linezolid: S <=1      -  Vancomycin: R >16      -  Ampicillin: R >8 Predicts results to ampicillin/sulbactam, amoxacillin-clavulanate and  piperacillin-tazobactam.      Method Type: PRIMITIVO  Organism: Pseudomonas aeruginosa (04-12-25 @ 01:17)      -  Levofloxacin: R >4      -  Aztreonam: R >16      -  Cefepime: S 8      -  Piperacillin/Tazobactam: S 16      -  Ciprofloxacin: R >2      -  Imipenem: S <=1      Method Type: PRIMITIVO      -  Meropenem: S <=1      -  Ceftazidime: S 8        SARS-CoV-2 Result: [facility] (31 Mar 2025 07:28)    Radiology: reviewed     Medications:  aspirin  chewable 81 milliGRAM(s) Oral daily  atorvastatin 40 milliGRAM(s) Oral at bedtime  bacitracin/polymyxin B Ointment 1 Application(s) Topical two times a day  benzocaine/menthol Lozenge 1 Lozenge Oral three times a day PRN  bisacodyl 5 milliGRAM(s) Oral every 12 hours PRN  cefepime   IVPB 1000 milliGRAM(s) IV Intermittent every 24 hours  chlorhexidine 2% Cloths 1 Application(s) Topical daily  clopidogrel Tablet 75 milliGRAM(s) Oral daily  Dakins Solution - 1/4 Strength 1 Application(s) Topical every 8 hours  DAPTOmycin IVPB 900 milliGRAM(s) IV Intermittent every 48 hours  epoetin aleah-epbx (RETACRIT) Injectable 87387 Unit(s) IV Push <User Schedule>  gabapentin 300 milliGRAM(s) Oral at bedtime  heparin   Injectable 5000 Unit(s) SubCutaneous every 8 hours  HYDROmorphone  Injectable 1 milliGRAM(s) IV Push every 6 hours PRN  hydrOXYzine hydrochloride 25 milliGRAM(s) Oral three times a day  ibuprofen  Tablet. 400 milliGRAM(s) Oral every 12 hours  lactobacillus acidophilus 1 Tablet(s) Oral two times a day with meals  methocarbamol 750 milliGRAM(s) Oral every 8 hours  midodrine. 10 milliGRAM(s) Oral <User Schedule>  mupirocin 2% Ointment 1 Application(s) Topical <User Schedule>  Nephro-margaret 1 Tablet(s) Oral daily  oxyCODONE    IR 10 milliGRAM(s) Oral every 4 hours PRN  oxyCODONE  ER Tablet 20 milliGRAM(s) Oral every 8 hours  pantoprazole    Tablet 40 milliGRAM(s) Oral before breakfast  polyethylene glycol 3350 17 Gram(s) Oral daily  povidone iodine 10% Solution 1 Application(s) Topical two times a day  senna 2 Tablet(s) Oral at bedtime  sodium chloride 0.9% lock flush 10 milliLiter(s) IV Push every 1 hour PRN    Current Antimicrobials:  cefepime   IVPB 1000 milliGRAM(s) IV Intermittent every 24 hours  DAPTOmycin IVPB 900 milliGRAM(s) IV Intermittent every 48 hours    Prior/Completed Antimicrobials:  piperacillin/tazobactam IVPB.  piperacillin/tazobactam IVPB.  piperacillin/tazobactam IVPB.-  piperacillin/tazobactam IVPB.-  trimethoprim   80 mG/sulfamethoxazole 400 mG  trimethoprim  160 mG/sulfamethoxazole 800 mG  vancomycin  IVPB  vancomycin  IVPB

## 2025-04-14 NOTE — PROGRESS NOTE ADULT - SUBJECTIVE AND OBJECTIVE BOX
Date of Service: 04-14-25 @ 14:23    Patient is a 75y old  Male who presents with a chief complaint of ESRD requiring HD, uremia (14 Apr 2025 09:52)      Any change in ROS: getting HD: no sob:  seems to have some prodcutive  cough  today    no phlegm      MEDICATIONS  (STANDING):  aspirin  chewable 81 milliGRAM(s) Oral daily  atorvastatin 40 milliGRAM(s) Oral at bedtime  bacitracin/polymyxin B Ointment 1 Application(s) Topical two times a day  cefepime   IVPB 1000 milliGRAM(s) IV Intermittent every 24 hours  chlorhexidine 2% Cloths 1 Application(s) Topical daily  clopidogrel Tablet 75 milliGRAM(s) Oral daily  Dakins Solution - 1/4 Strength 1 Application(s) Topical every 8 hours  DAPTOmycin IVPB 900 milliGRAM(s) IV Intermittent every 48 hours  epoetin aleah-epbx (RETACRIT) Injectable 50325 Unit(s) IV Push <User Schedule>  gabapentin 300 milliGRAM(s) Oral at bedtime  heparin   Injectable 5000 Unit(s) SubCutaneous every 8 hours  hydrOXYzine hydrochloride 25 milliGRAM(s) Oral three times a day  ibuprofen  Tablet. 400 milliGRAM(s) Oral every 12 hours  lactobacillus acidophilus 1 Tablet(s) Oral two times a day with meals  methocarbamol 750 milliGRAM(s) Oral every 8 hours  midodrine. 10 milliGRAM(s) Oral <User Schedule>  mupirocin 2% Ointment 1 Application(s) Topical <User Schedule>  Nephro-margaret 1 Tablet(s) Oral daily  oxyCODONE  ER Tablet 20 milliGRAM(s) Oral every 8 hours  pantoprazole    Tablet 40 milliGRAM(s) Oral before breakfast  polyethylene glycol 3350 17 Gram(s) Oral daily  povidone iodine 10% Solution 1 Application(s) Topical two times a day  senna 2 Tablet(s) Oral at bedtime    MEDICATIONS  (PRN):  benzocaine/menthol Lozenge 1 Lozenge Oral three times a day PRN Sore Throat  bisacodyl 5 milliGRAM(s) Oral every 12 hours PRN Constipation  HYDROmorphone  Injectable 1 milliGRAM(s) IV Push every 6 hours PRN severe breakthrough pain  oxyCODONE    IR 10 milliGRAM(s) Oral every 4 hours PRN Severe Pain (7 - 10)  sodium chloride 0.9% lock flush 10 milliLiter(s) IV Push every 1 hour PRN Pre/post blood products, medications, blood draw, and to maintain line patency    Vital Signs Last 24 Hrs  T(C): 36.1 (14 Apr 2025 12:38), Max: 36.9 (14 Apr 2025 04:10)  T(F): 97 (14 Apr 2025 12:38), Max: 98.4 (14 Apr 2025 04:10)  HR: 93 (14 Apr 2025 12:38) (93 - 104)  BP: 120/78 (14 Apr 2025 12:38) (105/80 - 120/78)  BP(mean): --  RR: 17 (14 Apr 2025 12:38) (17 - 19)  SpO2: 92% (14 Apr 2025 12:38) (92% - 98%)    Parameters below as of 14 Apr 2025 12:38  Patient On (Oxygen Delivery Method): room air        I&O's Summary        Physical Exam:   GENERAL: NAD, well-groomed, well-developed  HEENT: VINNY/   Atraumatic, Normocephalic  ENMT: No tonsillar erythema, exudates, or enlargement; Moist mucous membranes, Good dentition, No lesions  NECK: Supple, No JVD, Normal thyroid  CHEST/LUNG: Clear to auscultaion  CVS: Regular rate and rhythm; No murmurs, rubs, or gallops  GI: : Soft, Nontender, Nondistended; Bowel sounds present  NERVOUS SYSTEM:  Alert & Oriented X3  EXTREMITIES: gangrenous digits:   LYMPH: No lymphadenopathy noted  SKIN: No rashes or lesions  ENDOCRINOLOGY: No Thyromegaly  PSYCH: Appropriate    Labs:                              10.0   9.32  )-----------( 277      ( 12 Apr 2025 10:10 )             33.9                         9.5    11.15 )-----------( 325      ( 11 Apr 2025 09:03 )             31.3     04-12    135  |  99  |  19  ----------------------------<  90  4.0   |  23  |  2.74[H]  04-11    132[L]  |  96  |  29[H]  ----------------------------<  83  4.0   |  24  |  3.43[H]        CAPILLARY BLOOD GLUCOSE                      RECENT CULTURES:  04-08 @ 20:58 Other Sacral Wound Swab   PRIMITIVO      Pseudomonas aeruginosa  Escherichia coli (Carbapenem Resistant)  Enterococcus faecium (vancomycin resistant)  Pseudomonas aeruginosa     Few Pseudomonas aeruginosa  Rare Escherichia coli (Carbapenem Resistant)  Numerous Enterococcus faecium (vancomycin resistant)    rad< from: Xray Chest 1 View- PORTABLE-Urgent (Xray Chest 1 View- PORTABLE-Urgent .) (04.09.25 @ 17:11) >  Elevated right hemidiaphragm.  The heart is unchanged in size.  The lungs are clear.  There is no pneumothorax or pleural effusion.    IMPRESSION:  Clear lungs.    --- End of Report ---          KENN CARDENAS MD; Resident Radiologist  This document has been electronically signed.  CLARENCE MATHEWS MD; Attending Radiologist  This document hasbeen electronically signed. Apr 10 2025 11:53AM    < end of copied text >  < from: CT Chest w/ IV Cont (03.25.25 @ 13:43) >  *  Right lower lobe consolidative opacities, which may represent   pneumonia. Additional consolidative opacities in the left lower lobe,   potentially atelectasis or infection. A few ill-defined nodular opacities   in the left lower lobe measuring up to 6 mm may also be   infectious/inflammatory in etiology. Suggest continued attention on   follow-up imaging.  *  Small amount of debris in the trachea.  *  Small left pleural effusion.  *  Linear nonocclusive filling defect within the right internal jugular   vein, which may represent residual fibrin sheath or nonocclusive thrombus.    < end of copied text >        RESPIRATORY CULTURES:          Studies  Chest X-RAY  CT SCAN Chest   Venous Dopplers: LE:   CT Abdomen  Others

## 2025-04-14 NOTE — PROGRESS NOTE ADULT - PROBLEM SELECTOR PLAN 2
-CXR with elevated R hemidiaphragm (not present on CXR in December 2024)  -CT chest with LLL, RLL GGO, mucoid impacted airways. No clear PNA  -Suggest Duoneb q6h  -S/p Mucomyst x5 days   -Incentive spirometry   -CXR 3/1 with improved R hemidiaphragm, low lung volumes on L  -CXR 4/9 grossly clear.    4/12:  his last ct scan chest suggest old fibrin sheath in rt Int jugular vein : or non occlusive thrombus:  would suggest to do USG of rigth IJV    4/13: seen by vascular:  no role for ac:  he would need repeat duplex after dc with vascular surgery  4/14: as above:  has some productive cough : monitor for fever  :  he has once dev eloped pneumonia whilke in hospital

## 2025-04-14 NOTE — PROVIDER CONTACT NOTE (OTHER) - DATE AND TIME:
02-Mar-2025 14:57
03-Mar-2025 04:03
31-Mar-2025 23:50
16-Feb-2025 22:35
30-Mar-2025 00:21
17-Feb-2025 00:43
17-Feb-2025 20:26
18-Feb-2025 01:06
14-Apr-2025 21:10
14-Mar-2025 08:23
18-Feb-2025 01:59
10-Apr-2025 11:35
12-Apr-2025 19:29
30-Mar-2025 00:12
02-Mar-2025 13:50
20-Feb-2025 13:52

## 2025-04-14 NOTE — RAPID RESPONSE TEAM SUMMARY - NSSITUATIONBACKGROUNDRRT_GEN_ALL_CORE
75 year old male with CKD, sp polio, paraplegia with associated neurogenic bladder s/p suprapubic catheter who was admitted s/p fall on 2/16.   ESRD, s/p DEYA [place] 2/17, started on HD  2/20 s/p RRT for tremors and confusion -- pt with chronic tremors although notably worse  SPC site with chronic/stable inflammatory changes, no drainage - no sign of SSTI  CTA abd with occlusion of b/l external iliac arteries and b/l superficial femoral arteries with distal reconstitution at popliteal arteries; patent three vessel runoff of RLE with 2 vessel runoff of LLE with occlusion of L mid anterior tibial artery with distal reconstitution. Worsening PAD with worsening ischemic changes, necrotic toes, s/p RLE angiogram b/l iliac artery stents by Vascular 3/3  
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 RRT called for altered mental status and bilateral hand tremors. Patient also with a new O2 requirement of 2L NC. On arrival, patient with stable VS on 2L NC. Patient stating he "feels fine" and has a hx of tremors. Wife at bedside stating that the tremors were increased today which is why she alerted medical staff. She states that although patient seemed a little confused earlier in the day (hours before RRT) he is now at his baseline mental status. Full neuro exam revealed no focal deficits and patient was alert and oriented to all but time (wife stating he is intermittently not oriented to time in the hospital). Temperature found to be 100.5 under axilla. Given fever, new oxygen requirement, and wheezing on exam duonebs and CXR were ordered. CXR without a focal consolidation and not showing significant change from previous CXR. A full set of labs were ordered with blood cultures. Tylenol was given for fever. Patient was started on zosyn and given a dose of vancomycin for empiric coverage. The tremors are non-focal and could likely have been chills in the setting of fever. Wife again confirmed that patient is at baseline mental status. Vitals were checked again and RRT was ended. Primary team was asked to follow-up on labs.
75 year old male with CKD, sp polio, paraplegia with associated neurogenic bladder s/p suprapubic catheter who was admitted s/p fall on 2/16.   ESRD, s/p DEYA [place] 2/17, started on HD  2/20 s/p RRT for tremors and confusion -- pt with chronic tremors although notably worse  SPC site with chronic/stable inflammatory changes, no drainage - no sign of SSTI  CTA abd with occlusion of b/l external iliac arteries and b/l superficial femoral arteries with distal reconstitution at popliteal arteries; patent three vessel runoff of RLE with 2 vessel runoff of LLE with occlusion of L mid anterior tibial artery with distal reconstitution  Worsening PAD with worsening ischemic changes, necrotic toes, s/p RLE angiogram b/l iliac artery stents by Vascular 3/3    RRT called for HoTN, on arrival patient appeared comfortable, no acute distress, warm and well-perfused, s/p HD today with 500 cc UF, last given midodrine around midnight. Pt reportedly chronically hypotensive since initiation of HD. Receiving antimicrobials for wounds with Cefepime and Dapto. Afebrile.    #HoTN, resolved  -on repeat testing, patient MAP 70+  -will give 500 cc IVF NS and dose midodrine 20 mg x 1  d/w patient and HCP at bedside

## 2025-04-14 NOTE — PROGRESS NOTE ADULT - PROBLEM SELECTOR PLAN 1
CT chest with new RLL consolidation and LLL opacities, tracheal debris   -Suspect aspiration related s/p episode of vomiting  -S/p ABX as per ID  -Continue bronchodilators  -Keep sats >90% with o2 PRN  -Pt with low grade temp 3/30, dry cough. RVP negative, now afebrile. Continue to monitor fever curve  -Aspiration precautions  -CXR 4/9 grossly clear.    4/12: resolved:  his last cxr is clear:  4/14:  developed some productive cough today : no sob:  no wheezing:   cont antibiotics per ID:  pulm wise on room air:   aspiration precautions:       4/13: no new resp symtpoms:  has pain in wound  always on bed:  high risk for pneumonia dw pt and his wife at bedside

## 2025-04-15 NOTE — PROGRESS NOTE ADULT - PROBLEM SELECTOR PLAN 7
-Per wound care  -Pain control  -S/p debridement on 4/8 4/14: cont antibiotics  4/15: cont antibiotics:

## 2025-04-15 NOTE — PROGRESS NOTE ADULT - ASSESSMENT
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  came in after fall and for HD.     2/20 CTH neg   123 --> now 133   A1c 5.7   TSH WNL 3.46   o/e 2/21 AAOx2, uppers 4/5, lowers limited .  2/23; family bedside upset that he has pain in leg after he was moved.  patient going for imaging now.   s/p R IJ permacath by IR 2/25 2/27 AAOx3   sopoke with family bedisde 3/17 wife says mental status okay, sometimes up and down but good   3/18 was told he has "new neuropathy" spoke iwth patient and wife at bedside. states its the same from before not a new issue.    3/21 mental status stable. c/o pubic cathter   3/23 c/o nausea today   no new changes   4/3 neuro intact. LE same.  c/o Nausea   s/p sacral wound debridment   4/15 recontacted regarding abnormal brief jerks without impairment of mental status    Imrpssion  1) brief jerks, nonrhythmic, nonsustained, that can happen all over body, no impairment of mental status during jerks. Clinically suspicious for myoclonus, could be i/s/o of multiple toxic metabolic infectious processes.   2) AMS, waxing and waing , likely multifactorial from infection (WBC 16.69), metabolic/electrolyte derrangements/ delerium / medication effect, uremia which is imporving   3) polio, chronic weakness  4) fall 2/2 weakness  5) hyponatremia to 123 2/20  improved 133 -->136;   back down to 129 -- 133      -Will discuss medication recommendations with respect to myoclonus.  -  meropenum  still   - patient wants home f/u PT   - wound care    - AVF outpatient l; f/u vascular     f/u vascular; no vascluar options ; f/u with Messi from vasculare to schedule outpatient procedure   - on DAPT   - was getting oxycodone ER 30mg BID and oxy PRN IR i10 and dilauded PRN ;   consider gabapentin 200mg TID or lyrica 50mg BID fo nueorpathy if no objection ; patient states he still has pain but its better.  vascular has seen patient.  poor prognosis of LLE   - f/u psych   - limit sedating meds   - continue to monitor and correct metabolic derrangements .  - delerium precuations.   - frequent re orientation  - check b12, RPR, ammonia level if never checked    - PT/OT   - check FS, glucose control <180  - GI/DVT ppx  - Thank you for allowing me to participate in the care of this patient. Call with questions.    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  came in after fall and for HD.     2/20 CTH neg   123 --> now 133   A1c 5.7   TSH WNL 3.46   o/e 2/21 AAOx2, uppers 4/5, lowers limited .  2/23; family bedside upset that he has pain in leg after he was moved.  patient going for imaging now.   s/p R IJ permacath by IR 2/25 2/27 AAOx3   sopoke with family bedisde 3/17 wife says mental status okay, sometimes up and down but good   3/18 was told he has "new neuropathy" spoke iwth patient and wife at bedside. states its the same from before not a new issue.    3/21 mental status stable. c/o pubic cathter   3/23 c/o nausea today   no new changes   4/3 neuro intact. LE same.  c/o Nausea   s/p sacral wound debridment   4/15 recontacted regarding abnormal brief jerks without impairment of mental status    Impression  1) brief jerks, nonrhythmic, nonsustained, that can happen all over body, no impairment of mental status during jerks (although does have fluctuations in mental status, possibly from sedating medications). Clinically suspicious for myoclonus, could be i/s/o of multiple toxic metabolic infectious processes including renal failure requiring HD. However, will r/o seizure activity.  2) AMS, waxing and waing , likely multifactorial from infection (WBC 16.69), metabolic/electrolyte derrangements/ delerium / medication effect, uremia which is imporving   3) polio, chronic weakness  4) fall 2/2 weakness  5) hyponatremia to 123 2/20  improved 133 -->136;   back down to 129 -- 133      - recommend routine EEG to evaluate for seizure activity  - no further medication recommendations with respect to suspected myoclonus at this time (pt already over-sedated from existing pain medications), will get EEG to help clarify etiology and update regarding medication changes (or the lack thereof).  -  meropenum  still   - patient wants home f/u PT   - wound care    - AVF outpatient l; f/u vascular     f/u vascular; no vascular options ; f/u with Messi from vasculare to schedule outpatient procedure   - on DAPT   - was getting oxycodone ER 30mg BID and oxy PRN IR i10 and dilauded PRN ;   consider gabapentin 200mg TID or lyrica 50mg BID fo nueorpathy if no objection ; patient states he still has pain but its better.  vascular has seen patient.  poor prognosis of LLE   - f/u psych   - limit sedating meds   - continue to monitor and correct metabolic derrangements .  - delerium precuations.   - frequent re orientation  - check b12, RPR, ammonia level if never checked    - PT/OT   - check FS, glucose control <180  - GI/DVT ppx    Case discussed with neurology attending Dr. Limon.   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  came in after fall and for HD.     2/20 CTH neg   123 --> now 133   A1c 5.7   TSH WNL 3.46   o/e 2/21 AAOx2, uppers 4/5, lowers limited .  2/23; family bedside upset that he has pain in leg after he was moved.  patient going for imaging now.   s/p R IJ permacath by IR 2/25 2/27 AAOx3   sopoke with family bedisde 3/17 wife says mental status okay, sometimes up and down but good   3/18 was told he has "new neuropathy" spoke iwshahida patient and wife at bedside. states its the same from before not a new issue.    3/21 mental status stable. c/o pubic cathter   3/23 c/o nausea today   no new changes   4/3 neuro intact. LE same.  c/o Nausea   s/p sacral wound debridment   4/15 recontacted regarding abnormal brief jerks without impairment of mental status    Impression  1) brief jerks, nonrhythmic, nonsustained, that can happen all over body, no impairment of mental status during jerks (although does have fluctuations in mental status, possibly from sedating medications). Clinically suspicious for myoclonus, could be i/s/o of multiple toxic metabolic infectious processes including renal failure requiring HD. However, will r/o seizure activity.  2) AMS, waxing and waing , likely multifactorial from infection (WBC 16.69), metabolic/electrolyte derrangements/ delerium / medication effect, uremia which is imporving   3) polio, chronic weakness  4) fall 2/2 weakness  5) hyponatremia to 123 2/20  improved 133 -->136;   back down to 129 -- 133      - recommend routine EEG to evaluate for seizure activity  - no further medication recommendations with respect to suspected myoclonus at this time (pt already over-sedated from existing pain medications), will get EEG to help clarify etiology and update regarding medication changes (or the lack thereof). check ammonia level   -  meropenum  still ; also dapto through 4/19  - patient wants home f/u PT   - wound care    - AVF outpatient l; f/u vascular     f/u vascular; no vascular options ; f/u with Messi from vasculare to schedule outpatient procedure   - on DAPT   - was getting oxycodone ER 30mg BID and oxy PRN IR i10 and dilauded PRN ;   consider gabapentin 200mg TID or lyrica 50mg BID fo nueorpathy if no objection ; patient states he still has pain but its better.  vascular has seen patient.  poor prognosis of LLE   - f/u psych   - limit sedating meds   - continue to monitor and correct metabolic derrangements .  - delerium precuations.   - frequent re orientation  - check b12, RPR, ammonia level if never checked    - PT/OT   - check FS, glucose control <180  - GI/DVT ppx    Case discussed with neurology attending Dr. Limon.

## 2025-04-15 NOTE — PROGRESS NOTE ADULT - ASSESSMENT
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 The initial plan was that he would present to the [facility] ER Monday 2/17 for HD initiation. However, day of admission, he fell and sustained trauma to his knee. Called to consult for cough, elevated R hemidiaphragm.

## 2025-04-15 NOTE — PROGRESS NOTE ADULT - CONVERSATION DETAILS
GAP team along with wound care team spoke with Aura and Cori (via Phone) outside of the patient's room.  Wound discussed at length the extent of the wounds and a very slow healing process of wounds.  Cori expressed concerns of pain and we discussed following up with pain management to review his regimen.  Cori expressed her goal was for her father to be mobile but expressed she did not want her father to suffer.  Wound care discussed the need for mobility, pain control and proper nutrition to facilitate wound healing.  Conversation ended with plan to follow up with primary team and wound care. After introductions and explanations of our roles in the patient's care, the family expressed understanding and were receptive to today's meeting. The wound care team provided a medical overview, discussing the extent of the patient's wounds and his very slow healing process. When Cori inquired about the healing timeframe, wound care indicated that it could take years, although a precise estimate is difficult given the influence of nutrition and mobility on wound healing. The patient's wife expressed concern about his uncontrolled pain. We explained that pain management team will discussed with them in detailed pain management plan.     Cori stated that their goals are for the patient to regain mobility and achieve adequate pain control to minimize suffering. The palliative care team noted that during our last visit, the patient shared that designated Cori as his healthcare proxy prior to this hospital admission.  A MOLST form was also completed, with the patient consenting to DNR/DNI status. However, at that time, the patient's goal was to continue the current level of care, including hemodialysis, to prolong life.

## 2025-04-15 NOTE — PROGRESS NOTE ADULT - SUBJECTIVE AND OBJECTIVE BOX
Patient is a 75y old  Male who presents with a chief complaint of ESRD requiring HD, uremia (15 Apr 2025 16:00)      SUBJECTIVE / OVERNIGHT EVENTS: ptn is altered, has myoclonic jerks, he states he is in severe pain, ptn wofe, daughter palliaitive care and wound care had a 45 min discussion about prognosis and GOC. the daughter asked wound care if the ptn can be placed in an induced coma in order to do proper wound care and pain control. wound care team explained that is not a viable option. ptn is eating, but is not optimized nutritionally for better wound healing. he is also a high risk of complications from TPN. cont HD as per renal, ptn is on Midodrine for hypotension. wbc phoebe today, will do blood cx x 2. ABx as per ID. Neuro following. will get Head and chest CT    MEDICATIONS  (STANDING):  aspirin  chewable 81 milliGRAM(s) Oral daily  atorvastatin 40 milliGRAM(s) Oral at bedtime  bacitracin/polymyxin B Ointment 1 Application(s) Topical two times a day  cefepime   IVPB 1000 milliGRAM(s) IV Intermittent every 24 hours  chlorhexidine 2% Cloths 1 Application(s) Topical daily  clopidogrel Tablet 75 milliGRAM(s) Oral daily  Dakins Solution - 1/4 Strength 1 Application(s) Topical every 8 hours  DAPTOmycin IVPB 900 milliGRAM(s) IV Intermittent every 48 hours  epoetin aleah-epbx (RETACRIT) Injectable 33442 Unit(s) IV Push <User Schedule>  heparin   Injectable 5000 Unit(s) SubCutaneous every 8 hours  hydrOXYzine hydrochloride 25 milliGRAM(s) Oral three times a day  ibuprofen  Tablet. 400 milliGRAM(s) Oral every 12 hours  lactobacillus acidophilus 1 Tablet(s) Oral two times a day with meals  methocarbamol 750 milliGRAM(s) Oral every 8 hours  midodrine. 10 milliGRAM(s) Oral <User Schedule>  mupirocin 2% Ointment 1 Application(s) Topical <User Schedule>  Nephro-margaret 1 Tablet(s) Oral daily  oxyCODONE  ER Tablet 20 milliGRAM(s) Oral every 8 hours  pantoprazole    Tablet 40 milliGRAM(s) Oral before breakfast  polyethylene glycol 3350 17 Gram(s) Oral daily  povidone iodine 10% Solution 1 Application(s) Topical two times a day  senna 2 Tablet(s) Oral at bedtime    MEDICATIONS  (PRN):  benzocaine/menthol Lozenge 1 Lozenge Oral three times a day PRN Sore Throat  bisacodyl 5 milliGRAM(s) Oral every 12 hours PRN Constipation  HYDROmorphone  Injectable 1 milliGRAM(s) IV Push every 6 hours PRN severe breakthrough pain  oxyCODONE    IR 10 milliGRAM(s) Oral every 4 hours PRN Severe Pain (7 - 10)  sodium chloride 0.9% lock flush 10 milliLiter(s) IV Push every 1 hour PRN Pre/post blood products, medications, blood draw, and to maintain line patency      Vital Signs Last 24 Hrs  T(F): 97.9 (04-15-25 @ 16:21), Max: 98.9 (04-15-25 @ 09:40)  HR: 95 (04-15-25 @ 16:21) (72 - 95)  BP: 100/63 (04-15-25 @ 16:21) (91/51 - 119/71)  RR: 18 (04-15-25 @ 16:21) (18 - 18)  SpO2: 98% (04-15-25 @ 16:21) (95% - 98%)  Telemetry:   CAPILLARY BLOOD GLUCOSE      POCT Blood Glucose.: 140 mg/dL (14 Apr 2025 21:42)  POCT Blood Glucose.: 152 mg/dL (14 Apr 2025 18:42)    I&O's Summary    14 Apr 2025 07:01  -  15 Apr 2025 07:00  --------------------------------------------------------  IN: 0 mL / OUT: 500 mL / NET: -500 mL        PHYSICAL EXAM:  GENERAL: NAD, well-developed  HEAD:  Atraumatic, Normocephalic  EYES: EOMI, PERRLA, conjunctiva and sclera clear  NECK: Supple, No JVD  CHEST/LUNG: Clear to auscultation bilaterally; No wheeze  HEART: Regular rate and rhythm; No murmurs, rubs, or gallops  ABDOMEN: Soft, Nontender, Nondistended; Bowel sounds present  EXTREMITIES:  2+ Peripheral Pulses, No clubbing, cyanosis, or edema  PSYCH: AAOx3  NEUROLOGY: non-focal  SKIN: No rashes or lesions    LABS:                        11.5   16.69 )-----------( 278      ( 15 Apr 2025 10:37 )             38.4     04-15    133[L]  |  97  |  22  ----------------------------<  153[H]  5.1   |  22  |  3.36[H]    Ca    7.6[L]      15 Apr 2025 10:37  Phos  4.6     04-15  Mg     2.0     04-15            Urinalysis Basic - ( 15 Apr 2025 10:37 )    Color: x / Appearance: x / SG: x / pH: x  Gluc: 153 mg/dL / Ketone: x  / Bili: x / Urobili: x   Blood: x / Protein: x / Nitrite: x   Leuk Esterase: x / RBC: x / WBC x   Sq Epi: x / Non Sq Epi: x / Bacteria: x        RADIOLOGY & ADDITIONAL TESTS:    Imaging Personally Reviewed:    Consultant(s) Notes Reviewed:      Care Discussed with Consultants/Other Providers:

## 2025-04-15 NOTE — PROGRESS NOTE ADULT - ASSESSMENT
74M with history of HTN, polio with multiple subsequent problems including LE weakness requiring wheelchair, neurogenic bladder s/p suprapubic catheter, and colostomy s/p iatrogenic rectal injury 2/2023, and CKD 5 presenting to the ED with weakness and falls. Admitted for initiation of HD now s/p R IJ shiley placement on 2/17/25 and conversion to tunneled catheter on 2/25. Vascular surgery initially consulted for AVF creation; however, patient's worsening PAD with worsening ischemic changes is the more urgent issue now s/p b/l iliac stent placement on 3/3/25.     Wound Consult requested to assist w/ management of:  Sacral, Rt Ischial, and Left Buttock wounds  BLE severe PAD w/ extensive wounds     Sacrum - continue to pack w/ 1/4 Dakin's moistened packing BID  Lt Thigh/ Lt Calf wounds- medihoney dressing QD   Buttocks continue w/ TRIAD BID and prn soiling        Continue w/ attends under pads and Pericare as per protocol  Lt knee wounds- Betadine and open to air QD  Feet /ankle skin changes- as per podiatry  Appreciate Vascular input- continue to follow up  Ortho Follow up for Knee fx  Feet f/u as per Podiatry/ Vascular  BLE elevation & Compression  Abx per Medicine/ ID  Ongoing Palliative & Pain Mngt for GOC and pain mngt  Moisturize intact skin w/ SWEEN cream BID  Nutrition Consult for optimization in pt w/ Moderate Protein Calorie Malnutrition,        encourage high quality protein, sandy/ prosource, MVI & Vit C to promote wound healing  Continue turning and positioning w/ offloading assistive devices as per protocol  Waffle Cushion to chair when oob to chair  Continue w/ low air loss pressure redistribution bed surface   Pt will need Group 2 mattress on hospital al bed and ROHO cushion for wheel chair upon discharge home  Care as per medicine, f/u re BP/ AMS, will follow w/ you  Upon discharge f/u as outpatient at [facility] [address] [place] [address] [phone]  s/w  Attng/ Palliative team & Bedside and Manager RNs and D/w team    Thank you for this consult  Danica Humphreys PA-C CWS [id number]  Nights/ Weekends/ Holidays please call:  General Surgery Consult pager ([phone]) for emergencies  Wound PT for multilayer leg wrapping or VAC issues (x [phone])   I spent 50minutes face to face w/ this pt of which more than 50% of the time was spent counseling & coordinating care of this pt.        74M with history of HTN, polio with multiple subsequent problems including LE weakness requiring wheelchair, neurogenic bladder s/p suprapubic catheter, and colostomy s/p iatrogenic rectal injury 2/2023, and CKD 5 presenting to the ED with weakness and falls. Admitted for initiation of HD now s/p R IJ shiley placement on 2/17/25 and conversion to tunneled catheter on 2/25. Vascular surgery initially consulted for AVF creation; however, patient's worsening PAD with worsening ischemic changes is the more urgent issue now s/p b/l iliac stent placement on 3/3/25.     Wound Consult requested to assist w/ management of:  Sacral, Rt Ischial, and Left Buttock wounds  BLE severe PAD w/ extensive wounds   MAD    Sacrum - continue to pack w/ 1/4 Dakin's moistened packing BID  Lt Thigh/ Lt Calf wounds- medihoney dressing QD   Buttocks continue w/ TRIAD BID and prn soiling        Continue w/ attends under pads and Pericare as per protocol  Lt knee wounds- Betadine and open to air QD  left hip/groin- cavilon  Feet /ankle skin changes- as per podiatry  Appreciate Vascular input- continue to follow up  Ortho Follow up for Knee fx  Feet f/u as per Podiatry/ Vascular  BLE elevation & Compression  Abx per Medicine/ ID  Ongoing Palliative & Pain Mngt for GOC and pain mngt  Moisturize intact skin w/ SWEEN cream BID  Nutrition Consult for optimization in pt w/ Moderate Protein Calorie Malnutrition,        encourage high quality protein, sandy/ prosource, MVI & Vit C to promote wound healing  Continue turning and positioning w/ offloading assistive devices as per protocol  Waffle Cushion to chair when oob to chair  Continue w/ low air loss pressure redistribution bed surface   Pt will need Group 2 mattress on hospital al bed and ROHO cushion for wheel chair upon discharge home  Care as per medicine, f/u re BP/ AMS, will follow w/ you  Upon discharge f/u as outpatient at [facility] [address] [place] [address] [phone]  s/w  Attng/ Palliative team & Bedside and Manager RNs and D/w team    Thank you for this consult  Danica Humphreys PA-C CWS [id number]  Nights/ Weekends/ Holidays please call:  General Surgery Consult pager ([phone]) for emergencies  Wound PT for multilayer leg wrapping or VAC issues (x [phone])

## 2025-04-15 NOTE — PROGRESS NOTE ADULT - PROBLEM SELECTOR PLAN 2
he was diagnosed with polio at the age of 18 months old,  associated neurogenic bladder/suprapubic catheter and other several complications. multiple wounds: largest sacral wound s/p debridement on 4/8  - Wound care following   - pain management following   - Advise to premedicate with IV Dilaudid 15 min prior of wound clean   - management as per primary team and wound care

## 2025-04-15 NOTE — PROGRESS NOTE ADULT - PROBLEM SELECTOR PLAN 4
greatest source of pain is sacral pressure ulcer   - management as per pain management team he was diagnosed with polio at the age of 18 months old,  associated neurogenic bladder/suprapubic catheter and other several complications.

## 2025-04-15 NOTE — PROGRESS NOTE ADULT - NS ATTEND OPT1 GEN_ALL_CORE
I attest my time as attending is greater than 50% of the total combined time spent on qualifying patient care activities by the PA/NP and attending.
I independently performed the documented:

## 2025-04-15 NOTE — PROGRESS NOTE ADULT - PROBLEM SELECTOR PLAN 2
-CXR with elevated R hemidiaphragm (not present on CXR in December 2024)  -CT chest with LLL, RLL GGO, mucoid impacted airways. No clear PNA  -Suggest Duoneb q6h  -S/p Mucomyst x5 days   -Incentive spirometry   -CXR 3/1 with improved R hemidiaphragm, low lung volumes on L  -CXR 4/9 grossly clear.  4/12:  his last ct scan chest suggest old fibrin sheath in rt Int jugular vein : or non occlusive thrombus:  would suggest to do USG of rigth IJV  4/15: cont current care:     4/13: seen by vascular:  no role for ac:  he would need repeat duplex after dc with vascular surgery  4/14: as above:  has some productive cough : monitor for fever  :  he has once dev eloped pneumonia whilke in hospital

## 2025-04-15 NOTE — PROGRESS NOTE ADULT - PROBLEM SELECTOR PLAN 5
-S/p b/l iliac stent placement on 3/3/25  -Vascular, podiatry f/u    4/12: on antibiotics:  cont current care  and antibiotics:  defer to vascular surgery  4/13: cont antibiotics  4/14: antibiotics per ID  4/15: cont antibtiocs:" has lot of wounds

## 2025-04-15 NOTE — PROGRESS NOTE ADULT - SUBJECTIVE AND OBJECTIVE BOX
Date of Service: 04-15-25 @ 16:00    Patient is a 75y old  Male who presents with a chief complaint of ESRD requiring HD, uremia (15 Apr 2025 14:51)      Any change in ROS: looking pretty weak today  :       MEDICATIONS  (STANDING):  aspirin  chewable 81 milliGRAM(s) Oral daily  atorvastatin 40 milliGRAM(s) Oral at bedtime  bacitracin/polymyxin B Ointment 1 Application(s) Topical two times a day  cefepime   IVPB 1000 milliGRAM(s) IV Intermittent every 24 hours  chlorhexidine 2% Cloths 1 Application(s) Topical daily  clopidogrel Tablet 75 milliGRAM(s) Oral daily  Dakins Solution - 1/4 Strength 1 Application(s) Topical every 8 hours  DAPTOmycin IVPB 900 milliGRAM(s) IV Intermittent every 48 hours  epoetin aleah-epbx (RETACRIT) Injectable 01154 Unit(s) IV Push <User Schedule>  gabapentin 300 milliGRAM(s) Oral at bedtime  heparin   Injectable 5000 Unit(s) SubCutaneous every 8 hours  hydrOXYzine hydrochloride 25 milliGRAM(s) Oral three times a day  ibuprofen  Tablet. 400 milliGRAM(s) Oral every 12 hours  lactobacillus acidophilus 1 Tablet(s) Oral two times a day with meals  methocarbamol 750 milliGRAM(s) Oral every 8 hours  midodrine. 10 milliGRAM(s) Oral <User Schedule>  mupirocin 2% Ointment 1 Application(s) Topical <User Schedule>  Nephro-margaret 1 Tablet(s) Oral daily  oxyCODONE  ER Tablet 20 milliGRAM(s) Oral every 8 hours  pantoprazole    Tablet 40 milliGRAM(s) Oral before breakfast  polyethylene glycol 3350 17 Gram(s) Oral daily  povidone iodine 10% Solution 1 Application(s) Topical two times a day  senna 2 Tablet(s) Oral at bedtime    MEDICATIONS  (PRN):  benzocaine/menthol Lozenge 1 Lozenge Oral three times a day PRN Sore Throat  bisacodyl 5 milliGRAM(s) Oral every 12 hours PRN Constipation  HYDROmorphone  Injectable 1 milliGRAM(s) IV Push every 6 hours PRN severe breakthrough pain  oxyCODONE    IR 10 milliGRAM(s) Oral every 4 hours PRN Severe Pain (7 - 10)  sodium chloride 0.9% lock flush 10 milliLiter(s) IV Push every 1 hour PRN Pre/post blood products, medications, blood draw, and to maintain line patency    Vital Signs Last 24 Hrs  T(C): 37.2 (15 Apr 2025 09:40), Max: 37.2 (15 Apr 2025 09:40)  T(F): 98.9 (15 Apr 2025 09:40), Max: 98.9 (15 Apr 2025 09:40)  HR: 74 (15 Apr 2025 10:26) (72 - 85)  BP: 100/69 (15 Apr 2025 10:26) (91/51 - 119/71)  BP(mean): --  RR: 18 (15 Apr 2025 10:26) (18 - 18)  SpO2: 95% (15 Apr 2025 10:26) (95% - 95%)    Parameters below as of 15 Apr 2025 10:26  Patient On (Oxygen Delivery Method): room air        I&O's Summary    14 Apr 2025 07:01  -  15 Apr 2025 07:00  --------------------------------------------------------  IN: 0 mL / OUT: 500 mL / NET: -500 mL          Physical Exam:   GENERAL: NAD, well-groomed, well-developed  HEENT: VINNY/   Atraumatic, Normocephalic  ENMT: No tonsillar erythema, exudates, or enlargement; Moist mucous membranes, Good dentition, No lesions  NECK: Supple, No JVD, Normal thyroid  CHEST/LUNG: mild coarse rhonchi  CVS: Regular rate and rhythm; No murmurs, rubs, or gallops  GI: : Soft, Nontender, Nondistended; Bowel sounds present  NERVOUS SYSTEM:  Alert & Oriented X3  EXTREMITIES: gangenous digits:  with trace ankle edema left side   LYMPH: No lymphadenopathy noted  SKIN: No rashes or lesions  ENDOCRINOLOGY: No Thyromegaly  PSYCH: Appropriate    Labs:                              11.5   16.69 )-----------( 278      ( 15 Apr 2025 10:37 )             38.4                         10.0   9.32  )-----------( 277      ( 12 Apr 2025 10:10 )             33.9     04-15    133[L]  |  97  |  22  ----------------------------<  153[H]  5.1   |  22  |  3.36[H]  04-12    135  |  99  |  19  ----------------------------<  90  4.0   |  23  |  2.74[H]    Ca    7.6[L]      15 Apr 2025 10:37  Phos  4.6     04-15  Mg     2.0     04-15      CAPILLARY BLOOD GLUCOSE      POCT Blood Glucose.: 140 mg/dL (14 Apr 2025 21:42)  POCT Blood Glucose.: 152 mg/dL (14 Apr 2025 18:42)          Urinalysis Basic - ( 15 Apr 2025 10:37 )    Color: x / Appearance: x / SG: x / pH: x  Gluc: 153 mg/dL / Ketone: x  / Bili: x / Urobili: x   Blood: x / Protein: x / Nitrite: x   Leuk Esterase: x / RBC: x / WBC x   Sq Epi: x / Non Sq Epi: x / Bacteria: x            RECENT CULTURES:  04-08 @ 20:58 Other Sacral Wound Swab   PRIMITIVO      Pseudomonas aeruginosa  Escherichia coli (Carbapenem Resistant)  Enterococcus faecium (vancomycin resistant)  Pseudomonas aeruginosa     Few Pseudomonas aeruginosa  Rare Escherichia coli (Carbapenem Resistant)  Numerous Enterococcus faecium (vancomycin resistant)        ra< from: Xray Chest 1 View- PORTABLE-Urgent (Xray Chest 1 View- PORTABLE-Urgent .) (04.09.25 @ 17:11) >    INTERPRETATION:  EXAMINATION: XR CHEST URGENT    CLINICAL INDICATION: r/o pneumonia    TECHNIQUE: Single frontal, portable view of the chest was obtained.    COMPARISON: Chest x-ray 3/23/2025.    FINDINGS:  Right IJ central venous catheter terminating in the cavoatrial junction.  Elevated right hemidiaphragm.  The heart is unchanged in size.  The lungs are clear.  There is no pneumothorax or pleural effusion.    IMPRESSION:  Clear lungs.    --- End of Report ---          KENN CARDENAS MD; Resident Radiologist  This document has been electronically signed.  CLARENCE MATHEWS MD; Attending Radiologist  This document hasbeen electronically signed. Apr 10 2025 11:53AM    < end of copied text >    RESPIRATORY CULTURES:          Studies  Chest X-RAY  CT SCAN Chest   Venous Dopplers: LE:   CT Abdomen  Others

## 2025-04-15 NOTE — H&P ADULT - CONSTITUTIONAL
Discharge instructions were given to the patient by Martha BURGOS.     The patient left the Emergency Department alert and oriented and in no acute distress with 2 prescriptions. The patient was encouraged to call or return to the ED for worsening issues or problems and was encouraged to schedule a follow up appointment for continuing care.     Ambulation assessment completed before discharge.  Pt left Emergency Department ambulating at baseline with no ortho devices  Ortho device education: none    The patient verbalized understanding of discharge instructions and prescriptions, all questions were answered. The patient has no further concerns at this time.       well-groomed/no distress

## 2025-04-15 NOTE — PROGRESS NOTE ADULT - SUBJECTIVE AND OBJECTIVE BOX
NEPHROLOGY     Patient seen and examined with spouse at bedside, continues to report of pain, no sob, on room air, had HD yesterday and removed 0.5kg, in no acute distress. Noted RRT x 2 last night for hypotension and AMS.     MEDICATIONS  (STANDING):  aspirin  chewable 81 milliGRAM(s) Oral daily  atorvastatin 40 milliGRAM(s) Oral at bedtime  bacitracin/polymyxin B Ointment 1 Application(s) Topical two times a day  cefepime   IVPB 1000 milliGRAM(s) IV Intermittent every 24 hours  chlorhexidine 2% Cloths 1 Application(s) Topical daily  clopidogrel Tablet 75 milliGRAM(s) Oral daily  Dakins Solution - 1/4 Strength 1 Application(s) Topical every 8 hours  DAPTOmycin IVPB 900 milliGRAM(s) IV Intermittent every 48 hours  epoetin aleah-epbx (RETACRIT) Injectable 96163 Unit(s) IV Push <User Schedule>  gabapentin 300 milliGRAM(s) Oral at bedtime  heparin   Injectable 5000 Unit(s) SubCutaneous every 8 hours  hydrOXYzine hydrochloride 25 milliGRAM(s) Oral three times a day  ibuprofen  Tablet. 400 milliGRAM(s) Oral every 12 hours  lactobacillus acidophilus 1 Tablet(s) Oral two times a day with meals  methocarbamol 750 milliGRAM(s) Oral every 8 hours  midodrine. 10 milliGRAM(s) Oral <User Schedule>  mupirocin 2% Ointment 1 Application(s) Topical <User Schedule>  Nephro-margaret 1 Tablet(s) Oral daily  oxyCODONE  ER Tablet 20 milliGRAM(s) Oral every 8 hours  pantoprazole    Tablet 40 milliGRAM(s) Oral before breakfast  polyethylene glycol 3350 17 Gram(s) Oral daily  povidone iodine 10% Solution 1 Application(s) Topical two times a day  senna 2 Tablet(s) Oral at bedtime    VITALS:  T(C): , Max: 37.2 (04-15-25 @ 09:40)  T(F): , Max: 98.9 (04-15-25 @ 09:40)  HR: 74 (04-15-25 @ 10:26)  BP: 100/69 (04-15-25 @ 10:26)  RR: 18 (04-15-25 @ 10:26)  SpO2: 95% (04-15-25 @ 10:26)    I and O's:    04-14 @ 07:01  -  04-15 @ 07:00  --------------------------------------------------------  IN: 0 mL / OUT: 500 mL / NET: -500 mL    PHYSICAL EXAM:  Constitutional: alert, NAD  HEENT: NCAT, DMM  Neck: Supple, No JVD  Respiratory: diminished   Cardiovascular: RRR s1s2, no m/r/g  Gastrointestinal: BS+, soft, NT/ND  : (+)suprapubic cath  Extremities: 1+ b/l LE edema, wrapped in dsg  Neurological: reduced generalized strength  Back: no CVAT b/l  Skin: gangrenous changes b/l feet  Access: [facility] tunneled cath    LABS:                        11.5   16.69 )-----------( 278      ( 15 Apr 2025 10:37 )             38.4     04-15    133[L]  |  97  |  22  ----------------------------<  153[H]  5.1   |  22  |  3.36[H]    Ca    7.6[L]      15 Apr 2025 10:37  Phos  4.6     04-15  Mg     2.0     04-15

## 2025-04-15 NOTE — PROGRESS NOTE ADULT - SUBJECTIVE AND OBJECTIVE BOX
[facility]-- WOUND TEAM -- FOLLOW UP NOTE  --------------------------------------------------------------------------------    24 hour events/subjective:          Diet:  Diet, Regular:   1000mL Fluid Restriction ([id number])  Low Sodium  Liquid Protein Supplement     Qty per Day:  2  Supplement Feeding Modality:  Oral  Nepro Cans or Servings Per Day:  2       Frequency:  Daily (04-08-25 @ 17:54)      ROS: pt unable to offer    ALLERGIES & MEDICATIONS  --------------------------------------------------------------------------------    No Known Allergies          STANDING INPATIENT MEDICATIONS  aspirin  chewable 81 milliGRAM(s) Oral daily  atorvastatin 40 milliGRAM(s) Oral at bedtime  bacitracin/polymyxin B Ointment 1 Application(s) Topical two times a day  cefepime   IVPB 1000 milliGRAM(s) IV Intermittent every 24 hours  chlorhexidine 2% Cloths 1 Application(s) Topical daily  clopidogrel Tablet 75 milliGRAM(s) Oral daily  Dakins Solution - 1/4 Strength 1 Application(s) Topical every 8 hours  DAPTOmycin IVPB 900 milliGRAM(s) IV Intermittent every 48 hours  epoetin aleah-epbx (RETACRIT) Injectable 92206 Unit(s) IV Push <User Schedule>  gabapentin 300 milliGRAM(s) Oral at bedtime  heparin   Injectable 5000 Unit(s) SubCutaneous every 8 hours  hydrOXYzine hydrochloride 25 milliGRAM(s) Oral three times a day  ibuprofen  Tablet. 400 milliGRAM(s) Oral every 12 hours  lactobacillus acidophilus 1 Tablet(s) Oral two times a day with meals  methocarbamol 750 milliGRAM(s) Oral every 8 hours  midodrine. 10 milliGRAM(s) Oral <User Schedule>  mupirocin 2% Ointment 1 Application(s) Topical <User Schedule>  Nephro-margaret 1 Tablet(s) Oral daily  oxyCODONE  ER Tablet 20 milliGRAM(s) Oral every 8 hours  pantoprazole    Tablet 40 milliGRAM(s) Oral before breakfast  polyethylene glycol 3350 17 Gram(s) Oral daily  povidone iodine 10% Solution 1 Application(s) Topical two times a day  senna 2 Tablet(s) Oral at bedtime      PRN INPATIENT MEDICATION  benzocaine/menthol Lozenge 1 Lozenge Oral three times a day PRN  bisacodyl 5 milliGRAM(s) Oral every 12 hours PRN  HYDROmorphone  Injectable 1 milliGRAM(s) IV Push every 6 hours PRN  oxyCODONE    IR 10 milliGRAM(s) Oral every 4 hours PRN  sodium chloride 0.9% lock flush 10 milliLiter(s) IV Push every 1 hour PRN        VITALS/PHYSICAL EXAM  --------------------------------------------------------------------------------  T(C): 37.2 (04-15-25 @ 09:40), Max: 37.2 (04-15-25 @ 09:40)  HR: 74 (04-15-25 @ 10:26) (72 - 93)  BP: 100/69 (04-15-25 @ 10:26) (91/51 - 120/78)  RR: 18 (04-15-25 @ 10:26) (17 - 18)  SpO2: 95% (04-15-25 @ 10:26) (92% - 95%)  Wt(kg): --                  LABS/ CULTURES/ RADIOLOGY:              11.5   16.69 >-----------<  278      [04-15-25 @ 10:37]              38.4     133  |  97  |  22  ----------------------------<  153      [04-15-25 @ 10:37]  5.1   |  22  |  3.36        Ca     7.6     [04-15-25 @ 10:37]      Mg     2.0     [04-15-25 @ 10:37]      Phos  4.6     [04-15-25 @ 10:37]                CAPILLARY BLOOD GLUCOSE      POCT Blood Glucose.: 140 mg/dL (14 Apr 2025 21:42)  POCT Blood Glucose.: 152 mg/dL (14 Apr 2025 18:42)                      A1C with Estimated Average Glucose Result: 5.7 % (12-21-24 @ 06:44)   [facility]-- WOUND TEAM -- FOLLOW UP NOTE  --------------------------------------------------------------------------------    24 hour events/subjective:    alert, but sleepy= not as animated as usually is altered  afebrile  was tolerating po w/o n/v     now w/ poor appetite  incontinent  no c/o odor or excess drainage  pt s/p OR Debridement of Sacral and Lt gluteal crease/ thigh wounds by Gen Surg  pt has been refusing dressing changes  last pm RRT x2  this am pt hypoTN -low dose pain Rx given     pt now HD stabilized     pt pre- medicated w/ pain Rx       pain/discomfort minimized   wife at bedside- all questions answered to her expressed satisfaction  wife taking pictures   Daughter Cori requested to speak to team- declined request to have discussion recorded     Palliative team also present for discussion re wound management/ prognosis/ GOC     extensive discussion re wounds ability to heal and managing BP and Pain in pt w/ low BP and requiring HD     wounds will take very long time to heal-   reminded/ reeducated re importance of offloading, pericare/ hygiene, and dressing changes        Diet:  Diet, Regular:   1000mL Fluid Restriction ([id number])  Low Sodium  Liquid Protein Supplement     Qty per Day:  2  Supplement Feeding Modality:  Oral  Nepro Cans or Servings Per Day:  2       Frequency:  Daily (04-08-25 @ 17:54)      ROS: pt unable to offer    ALLERGIES & MEDICATIONS  --------------------------------------------------------------------------------    No Known Allergies      STANDING INPATIENT MEDICATIONS  aspirin  chewable 81 milliGRAM(s) Oral daily  atorvastatin 40 milliGRAM(s) Oral at bedtime  bacitracin/polymyxin B Ointment 1 Application(s) Topical two times a day  cefepime   IVPB 1000 milliGRAM(s) IV Intermittent every 24 hours  chlorhexidine 2% Cloths 1 Application(s) Topical daily  clopidogrel Tablet 75 milliGRAM(s) Oral daily  Dakins Solution - 1/4 Strength 1 Application(s) Topical every 8 hours  DAPTOmycin IVPB 900 milliGRAM(s) IV Intermittent every 48 hours  epoetin aleah-epbx (RETACRIT) Injectable 71733 Unit(s) IV Push <User Schedule>  gabapentin 300 milliGRAM(s) Oral at bedtime  heparin   Injectable 5000 Unit(s) SubCutaneous every 8 hours  hydrOXYzine hydrochloride 25 milliGRAM(s) Oral three times a day  ibuprofen  Tablet. 400 milliGRAM(s) Oral every 12 hours  lactobacillus acidophilus 1 Tablet(s) Oral two times a day with meals  methocarbamol 750 milliGRAM(s) Oral every 8 hours  midodrine. 10 milliGRAM(s) Oral <User Schedule>  mupirocin 2% Ointment 1 Application(s) Topical <User Schedule>  Nephro-margaret 1 Tablet(s) Oral daily  oxyCODONE  ER Tablet 20 milliGRAM(s) Oral every 8 hours  pantoprazole    Tablet 40 milliGRAM(s) Oral before breakfast  polyethylene glycol 3350 17 Gram(s) Oral daily  povidone iodine 10% Solution 1 Application(s) Topical two times a day  senna 2 Tablet(s) Oral at bedtime      PRN INPATIENT MEDICATION  benzocaine/menthol Lozenge 1 Lozenge Oral three times a day PRN  bisacodyl 5 milliGRAM(s) Oral every 12 hours PRN  HYDROmorphone  Injectable 1 milliGRAM(s) IV Push every 6 hours PRN  oxyCODONE    IR 10 milliGRAM(s) Oral every 4 hours PRN  sodium chloride 0.9% lock flush 10 milliLiter(s) IV Push every 1 hour PRN        VITALS/PHYSICAL EXAM  --------------------------------------------------------------------------------  T(C): 37.2 (04-15-25 @ 09:40), Max: 37.2 (04-15-25 @ 09:40)  HR: 74 (04-15-25 @ 10:26) (72 - 93)  BP: 100/69 (04-15-25 @ 10:26) (91/51 - 120/78)  RR: 18 (04-15-25 @ 10:26) (17 - 18)  SpO2: 95% (04-15-25 @ 10:26) (92% - 95%)  Wt(kg): --    NAD,  Alert, lethargic, Obese, WD/ WN/ WG  Total Care SPort bed  HEENT:  NC/AT, EOMI, sclera clear, mucosa moist, throat clear, trachea midline, neck supple  Respiratory: nonlabored w/ equal chest rise  Gastrointestinal: soft NT/ND   : (+) superpubic cath       no blistering or drainage  No odor, erythema, increased warmth, tenderness, induration, fluctuance, nor crepitus  Neurology:  weakened strength & sensation grossly intact  Psych: calm/ appropriate  Musculoskeletal: FROM, no deformities/ contractures  Vascular: BLE equally warm/cool,  no clubbing      (+)BLE edema equal       no BLE DP/PT pulses palpable    Bilateral feet/ heel dressings c/d/i as per Podiatry/ Vascular      no blistering or drainage  No odor, erythema, increased warmth, tenderness, induration, fluctuance, nor crepitus   Skin: thin, dry, pale, frail,  ecchymosis w/o hematoma  Lateral lt hip/ groin small areas of hyperpigmented denuded skin c/w moisture  Sacrum into Lt buttocks into perineum wound   soft  less grey slough w/ increased  area of pink moist granular tissue            and fading purple and pink denuded /partial thickness skin loss peripherally           10cm x 13cm x 2cm      w/serosanguinous drainage w/ green tinge on dressing  Rt Ischium stage 3 pressure injury     moist pink granular tissue     0.5cm x 2cm x0.1cm w/o drainage or blistering   Lt Buttocks moist red granular tissue w/ dry eschar     3cm x 3cm x0cm     no blistering or drainage  No odor, erythema, increased warmth, tenderness, induration, fluctuance, nor crepitus  Lt upper thigh wrapping posteriorly w/ 2 wounds    partial thickness moist red granular w/ small amount of grey/ tan slough    w/ serosanguinous drainage w/ green tinge  Lt Knee 5cm x 7cm x 0.2cm      dry black eschar  w/o drainage  Lateral upper calf (3cm x 3cm x0cm) wounds dry adherent scab/eschar  Lt calf wound improve moist red granular tissue w/ less grey/ white slough      w/ serosanguinous drainage    Mechanically debrided w/NS moist gauze lifting grey non viable tissues    10cm x 2.5cm x 0.4cm  No odor, erythema, increased warmth, tenderness, induration, fluctuance, nor crepitus        LABS/ CULTURES/ RADIOLOGY:              11.5   16.69 >-----------<  278      [04-15-25 @ 10:37]              38.4     133  |  97  |  22  ----------------------------<  153      [04-15-25 @ 10:37]  5.1   |  22  |  3.36        Ca     7.6     [04-15-25 @ 10:37]      Mg     2.0     [04-15-25 @ 10:37]      Phos  4.6     [04-15-25 @ 10:37]                CAPILLARY BLOOD GLUCOSE      POCT Blood Glucose.: 140 mg/dL (14 Apr 2025 21:42)  POCT Blood Glucose.: 152 mg/dL (14 Apr 2025 18:42)                      A1C with Estimated Average Glucose Result: 5.7 % (12-21-24 @ 06:44)   [facility]-- WOUND TEAM -- FOLLOW UP NOTE  --------------------------------------------------------------------------------    24 hour events/subjective:    alert, but sleepy= not as animated as usually is altered  afebrile  was tolerating po w/o n/v     now w/ poor appetite  incontinent  no c/o odor or excess drainage  pt s/p OR Debridement of Sacral and Lt gluteal crease/ thigh wounds by Gen Surg  pt has been refusing dressing changes  last pm RRT x2  this am pt hypoTN -low dose pain Rx given     pt now HD stabilized     pt pre- medicated w/ pain Rx       pain/discomfort minimized   wife at bedside- all questions answered to her expressed satisfaction  wife taking pictures   Daughter Cori requested to speak to team- declined request to have discussion recorded     Palliative team also present for discussion re wound management/ prognosis/ GOC     extensive discussion re wounds ability to heal and managing BP and Pain in pt w/ low BP and requiring HD     wounds will take very long time to heal-   reminded/ reeducated re importance of offloading, pericare/ hygiene, and dressing changes        Diet:  Diet, Regular:   1000mL Fluid Restriction ([id number])  Low Sodium  Liquid Protein Supplement     Qty per Day:  2  Supplement Feeding Modality:  Oral  Nepro Cans or Servings Per Day:  2       Frequency:  Daily (04-08-25 @ 17:54)      ROS: pt unable to offer    ALLERGIES & MEDICATIONS  --------------------------------------------------------------------------------    No Known Allergies      STANDING INPATIENT MEDICATIONS  aspirin  chewable 81 milliGRAM(s) Oral daily  atorvastatin 40 milliGRAM(s) Oral at bedtime  bacitracin/polymyxin B Ointment 1 Application(s) Topical two times a day  cefepime   IVPB 1000 milliGRAM(s) IV Intermittent every 24 hours  chlorhexidine 2% Cloths 1 Application(s) Topical daily  clopidogrel Tablet 75 milliGRAM(s) Oral daily  Dakins Solution - 1/4 Strength 1 Application(s) Topical every 8 hours  DAPTOmycin IVPB 900 milliGRAM(s) IV Intermittent every 48 hours  epoetin aleah-epbx (RETACRIT) Injectable 05458 Unit(s) IV Push <User Schedule>  gabapentin 300 milliGRAM(s) Oral at bedtime  heparin   Injectable 5000 Unit(s) SubCutaneous every 8 hours  hydrOXYzine hydrochloride 25 milliGRAM(s) Oral three times a day  ibuprofen  Tablet. 400 milliGRAM(s) Oral every 12 hours  lactobacillus acidophilus 1 Tablet(s) Oral two times a day with meals  methocarbamol 750 milliGRAM(s) Oral every 8 hours  midodrine. 10 milliGRAM(s) Oral <User Schedule>  mupirocin 2% Ointment 1 Application(s) Topical <User Schedule>  Nephro-margaret 1 Tablet(s) Oral daily  oxyCODONE  ER Tablet 20 milliGRAM(s) Oral every 8 hours  pantoprazole    Tablet 40 milliGRAM(s) Oral before breakfast  polyethylene glycol 3350 17 Gram(s) Oral daily  povidone iodine 10% Solution 1 Application(s) Topical two times a day  senna 2 Tablet(s) Oral at bedtime      PRN INPATIENT MEDICATION  benzocaine/menthol Lozenge 1 Lozenge Oral three times a day PRN  bisacodyl 5 milliGRAM(s) Oral every 12 hours PRN  HYDROmorphone  Injectable 1 milliGRAM(s) IV Push every 6 hours PRN  oxyCODONE    IR 10 milliGRAM(s) Oral every 4 hours PRN  sodium chloride 0.9% lock flush 10 milliLiter(s) IV Push every 1 hour PRN        VITALS/PHYSICAL EXAM  --------------------------------------------------------------------------------  T(C): 37.2 (04-15-25 @ 09:40), Max: 37.2 (04-15-25 @ 09:40)  HR: 74 (04-15-25 @ 10:26) (72 - 93)  BP: 100/69 (04-15-25 @ 10:26) (91/51 - 120/78)  RR: 18 (04-15-25 @ 10:26) (17 - 18)  SpO2: 95% (04-15-25 @ 10:26) (92% - 95%)  Wt(kg): --    NAD,  Alert, lethargic, Obese, WD/ WN/ WG  Total Care SPort bed  HEENT:  NC/AT, EOMI, sclera clear, mucosa moist, throat clear, trachea midline, neck supple  Respiratory: nonlabored w/ equal chest rise  Gastrointestinal: soft NT/ND   : (+) superpubic cath       no blistering or drainage  No odor, erythema, increased warmth, tenderness, induration, fluctuance, nor crepitus  Neurology:  weakened strength & sensation grossly intact  Psych: calm/ appropriate  Musculoskeletal: FROM, no deformities/ contractures  Vascular: BLE equally warm/cool,  no clubbing      (+)BLE edema equal       no BLE DP/PT pulses palpable    Bilateral feet/ heel dressings c/d/i as per Podiatry/ Vascular      no blistering or drainage  No odor, erythema, increased warmth, tenderness, induration, fluctuance, nor crepitus   Skin: thin, dry, pale, frail,  ecchymosis w/o hematoma  Lateral lt hip/ groin small areas of hyperpigmented denuded skin c/w moisture  Sacrum into Lt buttocks into perineum wound   soft  less grey slough w/ increased  area of pink moist granular tissue            and fading purple and pink denuded /partial thickness skin loss peripherally           10cm x 13cm x 2cm      w/serosanguinous drainage w/ green tinge on dressing  Rt Ischium stage 3 pressure injury     moist pink granular tissue     0.5cm x 2cm x0.1cm w/o drainage or blistering   Lt Buttocks moist red granular tissue w/ dry eschar     3cm x 3cm x0cm     no blistering or drainage  No odor, erythema, increased warmth, tenderness, induration, fluctuance, nor crepitus  Lt upper thigh wrapping posteriorly w/ 2 wounds    moist red granular w/ small amount of grey/ tan slough    w/ serosanguinous drainage w/ green tinge  Lt Knee 5cm x 7cm x 0.2cm      dry black eschar  w/o drainage  Lateral upper calf (3cm x 3cm x0cm) wounds dry adherent eschar  Lt calf wound improve moist red granular tissue w/ less grey/ white slough      w/ serosanguinous drainage    Mechanically debrided w/NS moist gauze lifting grey non viable tissues    10cm x 2.5cm x 0.4cm  No odor, erythema, increased warmth, tenderness, induration, fluctuance, nor crepitus        LABS/ CULTURES/ RADIOLOGY:              11.5   16.69 >-----------<  278      [04-15-25 @ 10:37]              38.4     133  |  97  |  22  ----------------------------<  153      [04-15-25 @ 10:37]  5.1   |  22  |  3.36        Ca     7.6     [04-15-25 @ 10:37]      Mg     2.0     [04-15-25 @ 10:37]      Phos  4.6     [04-15-25 @ 10:37]                CAPILLARY BLOOD GLUCOSE      POCT Blood Glucose.: 140 mg/dL (14 Apr 2025 21:42)  POCT Blood Glucose.: 152 mg/dL (14 Apr 2025 18:42)                      A1C with Estimated Average Glucose Result: 5.7 % (12-21-24 @ 06:44)

## 2025-04-15 NOTE — PROGRESS NOTE ADULT - ATTENDING COMMENTS
pt seen in consult for brooke- spt drainage.      nad ncat abd nt   spt site with appropriate redness a skin edge c/w fb reaction.  no drainage.  minimal mucoid debris can be expressed around tract with manipulation c/w typical percath driange through and spt tract.  no fluctuance no induration    may continue spt with q 4 week changes
Patient s/p debridement of sacral decubitus ulcer and left posterior thigh wound in OR 4/8. He complains primarily of pain in his ankle. Feels his pain in his sacrum is not bad as long as he lies on his stomach with the area offloaded    My team attempted to examined the sacral wound and do a dressing change twice today but both times patient refused. He feels his pain is too much and he just wants to rest. He said that his wife would provide wound care.     If patient amenable, recommend nursing to clean sacral wound, pat dry, apply collagenase ointment to fibrinous areas, cover with moist gauze, and cover with mepilex. Continue offloading area as much as possible.     Tressa Blackman MD   Trauma and Acute Care Surgery
agree with above  doubt seizure but will rule out with EEG  c/w myoclonic jerks    correct metabolic derangemnets   palliative following   Paul Limon MD  Vascular Neurology  Office: [phone]
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  Requiring debridement of sacral wound.  Palliative reconsulted for goals of care     The patient was seen and examined and noted to be lethargic, with difficulty articulating words. He was unable to participate in the conversation. We spoke with his daughter, Cori (by phone), and his wife (in person). The wound care team provided a medical overview, discussing the extent of the patient's wounds and the slow healing process. Cori stated that their goals are to regain mobility and achieve adequate pain control to minimize the patient's suffering. Patient designated Cori as his healthcare proxy prior to this hospital admission, pending to provide documentation. A MOLST form was also completed on last palliative visit, with the patient consenting to DNR/DNI status. However, his goal then was to continue the current level of care, including hemodialysis, to prolong life. The primary team was advised to discuss with the pain management team, as the family reports inadequate pain control despite concerns about hypotension. The plan is to continue the current level of care with improved pain management.  Case reviewed with wound care attending, PA, RN and primary team.

## 2025-04-15 NOTE — PROGRESS NOTE ADULT - PROBLEM SELECTOR PLAN 1
CT chest with new RLL consolidation and LLL opacities, tracheal debris   -Suspect aspiration related s/p episode of vomiting  -S/p ABX as per ID  -Continue bronchodilators  -Keep sats >90% with o2 PRN  -Pt with low grade temp 3/30, dry cough. RVP negative, now afebrile. Continue to monitor fever curve  -Aspiration precautions  -CXR 4/9 grossly clear.    4/12: resolved:  his last cxr is clear:  4/14:  developed some productive cough today : no sob:  no wheezing:   cont antibiotics per ID:  pulm wise on room air:   aspiration precautions:       4/13: no new resp symptoms:  has pain in wound  always on bed:  high risk for pneumonia dw pt and his wife at bedside    4/15today he looks pretty weak : bed bound  has sacral decubiti ; and gangrenous digits:  pulm wise he seems OK:  has some dry cough : but his last chest xray was clear:  no antibiotics needed from pulm point of view:

## 2025-04-15 NOTE — PROGRESS NOTE ADULT - PROBLEM SELECTOR PLAN 5
HCP: daughter Cori, form in physical chart     Code status: DNR/I HCP: sachin Persaud  Code status: DNR/I     Spoke with daughter Coir (over the phone) and patient's wife in person. Wound care team provided a medical overview, discussing the extent of the patient's wounds and his very slow healing process. When Cori inquired about the healing timeframe, wound care indicated that it could take years, although a precise estimate is difficult given the influence of nutrition and mobility on wound healing. The patient's wife expressed concern about his uncontrolled pain.    Cori stated that their goals are for the patient to regain mobility and achieve adequate pain control to minimize suffering. The palliative care team noted that during our last visit, the patient shared that designated Cori as his healthcare proxy prior to this hospital admission.  A MOLST form was also completed, with the patient consenting to DNR/DNI status. However, at that time, the patient's goal was to continue the current level of care, including hemodialysis, to prolong life.     Please see above [facility] note

## 2025-04-15 NOTE — PROGRESS NOTE ADULT - ASSESSMENT
75 year old male with CKD, sp polio, paraplegia with associated neurogenic bladder s/p suprapubic catheter who was admitted s/p fall on 2/16.   ESRD, s/p DEYA [place] 2/17 ans started on HD  2/20 s/p RRT for tremors and confusion -- pt with chronic tremors although notably worse  SPC site with chronic/stable inflammatory changes, no drainage - no sign of SSTI  CTA abd with occlusion of b/l external iliac arteries and b/l superficial femoral arteries with distal reconstitution at popliteal arteries; patent three vessel runoff of RLE with 2 vessel runoff of LLE with occlusion of L mid anterior tibial artery with distal reconstitution  Worsening PAD with worsening ischemic changes, necrotic toes, s/p RLE angiogram b/l iliac artery stents by Vascular 3/3    3/16 no tenderness at suprapubic catheter site, no visible erythema at catheter site on exam  UA w/ many epithelial cells, Ucx negative  s/p zosyn 3/14-3/16  3/19 SPC site remains without erythema but now noted with some tan crusting on dressing  Treated for possible SPC site infection with bactrim 3/19-3/25  3/23 reporting vomiting x5 episodes, no diarrhea or other focal sx  3/25 CT Chest noted RLL consolidative opacities, LLL opacities - pneumonia vs atelectasis, few ill defined nodular opacities in LLL infectious vs inflammatory  3/25 CTAP with subcutaneous gas and infiltration tracking along medial R buttock, possibly related to decubitus wound with gas tracking from the external environment, possible necrotizing air forming infection; rectal wall thickening suggestive of proctitis; b/l mild hydroureteronephrosis to level of bladder with no obstruction and diffuse urothelial thickening of b/l renal collecting systems and ureters similar to 2/20  MRSA screen has been negative   no resp symptoms- no cough, dyspnea or pain, may have aspirated with vomiting earlier, saturating well on RA  3/26 s/p bedside debridement of L knee wound  as dry eschar  from wound edges without drainage and bedside excisional debridement of unstageable sacrum to left buttock into perineum evolving unstageable pressure injury through necrotic dermis into nonviable subcutaneous tissues, debulking debridement of necrotic tissue done by Wound care; other wounds noted including L calf to ankle wound with liquefaction necrotic drainage; R ischium wound with moist pink granular tissue and L buttock fading DTI  3/30 overnight febrile to 100.9F ?inflammatory -- no further fevers noted, WBC wnl, was on meropenem   Completed meropenem x10d course on 4/3   Sacral wound with eschar with surrounding red granulation tissue, noted no obv acute infection, no sig purulence, no sig surrounding erythema noted, remains afebrile, WBC wnl, nontoxic appearing   4/8 s/p OR for sacral and thigh wound debridement    -- brief op note reviewed -- [id number] sacral wound with necrotic tissue and rind. Debridement was performed with sharp dissection followed by pulse irrigation. L thigh wounds x2 in posterior thigh s/p debridement with sharp dissection, noted with purulence    -- OR culture with Pseudomonas aeruginosa, CRE-E. coli and VRE-E.faecium - sensitivities reviewed   4/10 patient allowed surgery and wound care teams to change dressings, unable to take prn opiates at times d/t pain  Leukocytosis noted, suspect reactive, remains afebrile, nontoxic appearing   4/14 s/p RRT for hypotension, had HD earlier, s/p IVF and midodrine  4/14 overnight s/p RRT for AMS, responded to sternal rub and answered appropriated, annoyed at presence of RRT team   - noted some concern for aspiration as pt ate soup prior to RRT  4/15 remains afebrile, feels pain is controlled    s/p meropenem     Recommendations:   Follow official CXR report   Follow path report   Continue cefepime 1g IV Q24h (renally dosed, on HD days give post HD)  Continue daptomycin 10mg/kg IV Q48h (renally dosed, if on HD day--give post HD)   - baseline CPK wnl, check weekly cpk  -- as patient bedbound long term IV antibiotics unlikely to provide much benefit and will have adverse effects including risk of C.diff, disruption of gut microbiome, selection for resistant organisms making treatment of any infection more difficult in the future but given purulence seen on I&D, will treat for now with 6 week course   Will plan to adjust dosing to be given post HD once ready for d/c  Surgery and Wound care teams following  Monitor temps/WBC  Continue local wound care, nutrition, offloading as able  HD per renal    SPC site dressing change and care   Contact isolation per IC protocol   Continue rest of care per primary team       Bridgette Ramirez M.D.  [facility]  Available on [employer] TEAMS - *PREFERRED*  [phone]  After 5pm on weekdays and all day on weekends - please call [phone]     Thank you for consulting us and involving us in the management of this patients case. In addition to reviewing history, imaging, documents, labs, microbiology, took into account antibiotic stewardship, local antibiogram and infection control strategies and potential transmission issues at time of treatment decision making process.

## 2025-04-15 NOTE — PROGRESS NOTE ADULT - SUBJECTIVE AND OBJECTIVE BOX
Date of Service: 04-15-25 @ 12:52    SUBJECTIVE AND OBJECTIVE:  Indication for Geriatrics and Palliative Care Services/INTERVAL HPI: GAP team reconsulted for assistance with complex medical decision making.     OVERNIGHT EVENTS: noted to have a RRT for hypotension and AMS.  24h (7a-7a) prn analgesic  use reviewed: oxy ir 10 mg x 1 severe pain, iv dilaudid 1mg x 1 severe pain / breakthrough.  at bedside, patient noted to be minimally verbal, with myoclonic jerks.  his wife Dee Dee a bedside.  see GOC section below     DNR on chart:DNI  DNI      Allergies    No Known Allergies    Intolerances    MEDICATIONS  (STANDING):  aspirin  chewable 81 milliGRAM(s) Oral daily  atorvastatin 40 milliGRAM(s) Oral at bedtime  bacitracin/polymyxin B Ointment 1 Application(s) Topical two times a day  cefepime   IVPB 1000 milliGRAM(s) IV Intermittent every 24 hours  chlorhexidine 2% Cloths 1 Application(s) Topical daily  clopidogrel Tablet 75 milliGRAM(s) Oral daily  Dakins Solution - 1/4 Strength 1 Application(s) Topical every 8 hours  DAPTOmycin IVPB 900 milliGRAM(s) IV Intermittent every 48 hours  epoetin aleah-epbx (RETACRIT) Injectable 15760 Unit(s) IV Push <User Schedule>  gabapentin 300 milliGRAM(s) Oral at bedtime  heparin   Injectable 5000 Unit(s) SubCutaneous every 8 hours  hydrOXYzine hydrochloride 25 milliGRAM(s) Oral three times a day  ibuprofen  Tablet. 400 milliGRAM(s) Oral every 12 hours  lactobacillus acidophilus 1 Tablet(s) Oral two times a day with meals  methocarbamol 750 milliGRAM(s) Oral every 8 hours  midodrine. 10 milliGRAM(s) Oral <User Schedule>  mupirocin 2% Ointment 1 Application(s) Topical <User Schedule>  Nephro-margaret 1 Tablet(s) Oral daily  oxyCODONE  ER Tablet 20 milliGRAM(s) Oral every 8 hours  pantoprazole    Tablet 40 milliGRAM(s) Oral before breakfast  polyethylene glycol 3350 17 Gram(s) Oral daily  povidone iodine 10% Solution 1 Application(s) Topical two times a day  senna 2 Tablet(s) Oral at bedtime    MEDICATIONS  (PRN):  benzocaine/menthol Lozenge 1 Lozenge Oral three times a day PRN Sore Throat  bisacodyl 5 milliGRAM(s) Oral every 12 hours PRN Constipation  HYDROmorphone  Injectable 1 milliGRAM(s) IV Push every 6 hours PRN severe breakthrough pain  oxyCODONE    IR 10 milliGRAM(s) Oral every 4 hours PRN Severe Pain (7 - 10)  sodium chloride 0.9% lock flush 10 milliLiter(s) IV Push every 1 hour PRN Pre/post blood products, medications, blood draw, and to maintain line patency      ITEMS UNCHECKED ARE NOT PRESENT    PRESENT SYMPTOMS: [x ]Unable to self-report - see [ ] CPOT [ ] PAINADS [ ] RDOS  Source if other than patient:  [ ]Family   [ ]Team     Pain:  [ ]yes [x ]no  QOL impact -   Location -                    Aggravating factors -  Quality -  Radiation -  Timing-  Severity (0-10 scale):  Minimal acceptable level (0-10 scale):     CPOT:    https://www.[facility].org/getattachment/[email or web address]/Critical-Care-Pain-Observation-Tool-(CPOT)    PAINAD Score: See PAINAD tool and score below     RDOS: See RDOS tool and score below   0 to 2  minimal or no respiratory distress   3  mild distress  4 to 6 moderate distress  >7 severe distress    Dyspnea:                           [ ]Mild [ ]Moderate [ ]Severe  Anxiety:                             [ ]Mild [ ]Moderate [ ]Severe  Fatigue:                             [ ]Mild [ ]Moderate [ ]Severe  Nausea:                             [ ]Mild [ ]Moderate [ ]Severe  Loss of appetite:              [ ]Mild [ ]Moderate [ ]Severe  Constipation:                    [ ]Mild [ ]Moderate [ ]Severe    PCSSQ[Palliative Care Spiritual Screening Question]   Severity (0-10):  Score of 4 or > indicate consideration of Chaplaincy referral.  Chaplaincy Referral: [ ] yes [ ] refused [ ] following [x ] Deferred     Caregiver [place]? : [ ] yes [ ] no [x ] Deferred [ ] Declined             Social work referral [ ] Patient & Family Centered Care Referral [ ]     Anticipatory Grief present?:  [ ] yes [ ] no  [x ] Deferred                  Social work referral [ ] Chaplaincy Referral [ ]  		  Other Symptoms:  [ ]All other review of systems negative   [x] unable to assess due to altered mental status     Palliative Performance Status Version 2:   See PPSv2 tool and score below         PHYSICAL EXAM:  Vital Signs Last 24 Hrs  T(C): 37.2 (15 Apr 2025 09:40), Max: 37.2 (15 Apr 2025 09:40)  T(F): 98.9 (15 Apr 2025 09:40), Max: 98.9 (15 Apr 2025 09:40)  HR: 74 (15 Apr 2025 10:26) (72 - 87)  BP: 100/69 (15 Apr 2025 10:26) (91/51 - 119/71)  BP(mean): --  RR: 18 (15 Apr 2025 10:26) (17 - 18)  SpO2: 95% (15 Apr 2025 10:26) (92% - 95%)    Parameters below as of 15 Apr 2025 10:26  Patient On (Oxygen Delivery Method): room air     I&O's Summary    14 Apr 2025 07:01  -  15 Apr 2025 07:00  --------------------------------------------------------  IN: 0 mL / OUT: 500 mL / NET: -500 mL       GENERAL: [ ]Cachexia    [x ]Alert  [ ]Oriented   [ ]Lethargic  [ ]Unarousable  [x ]Verbal  [ ]Non-Verbal  Behavioral:   [ ]Anxiety  [x ]Delirium [ ]Agitation [ ]Other  HEENT:  [ ]Normal   [ x]Dry mouth   [ ]ET Tube/Trach  [ ]Oral lesions  PULMONARY:   [x ]Clear [ ]Tachypnea  [ ]Audible excessive secretions   [ ]Rhonchi        [ ]Right [ ]Left [ ]Bilateral  [ ]Crackles        [ ]Right [ ]Left [ ]Bilateral  [ ]Wheezing     [ ]Right [ ]Left [ ]Bilateral  [ ]Diminished BS [ ] Right [ ]Left [ ]Bilateral  CARDIOVASCULAR:    [ x]Regular [ ]Irregular [ ]Tachy  [ ]Victor M [ ]Murmur [ ]Other  GASTROINTESTINAL:  [x ]Soft  [ ]Distended   [x ]+BS  [x ]Non tender [ ]Tender  [ ]Other [x ]PEG [ ]OGT/ NGT   Last BM: 4/15  GENITOURINARY:  [ x]Normal [ ]Incontinent   [ ]Oliguria/Anuria   [ ]Murillo  MUSCULOSKELETAL:   [ ]Normal   [ x]Weakness  [x ]Bed/Wheelchair bound [ ]Edema  NEUROLOGIC:   [ ]No focal deficits  [ x] Cognitive impairment  [ x] Dysphagia [x ]Dysarthria [ ] Paresis [ ]Other   SKIN:   [ ]Normal  [ ]Rash  [ ]Other  [ x]Pressure ulcer(s) [ x]y [ ]n present on admission    CRITICAL CARE:  [ ]Shock Present  [ ]Septic [ ]Cardiogenic [ ]Neurologic [ ]Hypovolemic  [ ]Vasopressors [ ]Inotropes  [ ]Respiratory failure present [ ]Mechanical Ventilation [ ]Non-invasive ventilatory support [ ]High-Flow   [ ]Acute  [ ]Chronic [ ]Hypoxic  [ ]Hypercarbic [ ]Other  [ x]Other organ failure - skin     LABS:                        11.5   16.69 )-----------( 278      ( 15 Apr 2025 10:37 )             38.4   04-15    133[L]  |  97  |  22  ----------------------------<  153[H]  5.1   |  22  |  3.36[H]    Ca    7.6[L]      15 Apr 2025 10:37  Phos  4.6     04-15  Mg     2.0     04-15        Urinalysis Basic - ( 15 Apr 2025 10:37 )    Color: x / Appearance: x / SG: x / pH: x  Gluc: 153 mg/dL / Ketone: x  / Bili: x / Urobili: x   Blood: x / Protein: x / Nitrite: x   Leuk Esterase: x / RBC: x / WBC x   Sq Epi: x / Non Sq Epi: x / Bacteria: x      RADIOLOGY & ADDITIONAL STUDIES:  < from: Xray Chest 1 View- PORTABLE-Urgent (Xray Chest 1 View- PORTABLE-Urgent .) (04.09.25 @ 17:11) >  FINDINGS:  Right IJ central venous catheter terminating in the cavoatrial junction.  Elevated right hemidiaphragm.  The heart is unchanged in size.  The lungs are clear.  There is no pneumothorax or pleural effusion.    IMPRESSION:  Clear lungs.    --- End of Report ---    < end of copied text >      Protein Calorie Malnutrition Present: [ ]mild [ ]moderate [ ]severe [ ]underweight [ ]morbid obesity  https://www.andeal.org/vault/2440/web/files/ONC/Table_Clinical%20Characteristics%20to%20Document%20Malnutrition-White%20JV%20et%20al%202012.pdf    Height (cm): 172.7 (04-08-25 @ 15:25), 172.7 (02-04-25 @ 16:31), 172.7 (12-20-24 @ 12:36)  Weight (kg): 90.7 (04-08-25 @ 15:25), 90.7 (02-04-25 @ 16:31), 94.3 (12-20-24 @ 12:36)  BMI (kg/m2): 30.4 (04-08-25 @ 15:25), 30.4 (02-04-25 @ 16:31), 31.6 (12-20-24 @ 12:36)    [ x]PPSV2 < or = 30%  [ ]significant weight loss [ ]poor nutritional intake [ ]anasarca[ ]Artificial Nutrition    Other REFERRALS:  [ ]Hospice  [ ]Child Life  [ ]Social Work  [ ]Case management [ ]Holistic Therapy     Goals of Care Document: Date of Service: 04-15-25 @ 12:52    SUBJECTIVE AND OBJECTIVE:  Indication for Geriatrics and Palliative Care Services/INTERVAL HPI: GAP team reconsulted for goals fo care     OVERNIGHT EVENTS: noted to have a RRT for hypotension and AMS.     Patient seen and examined at bedside, patient appears lethargic, arousable to voice. Follows occasional simple command. Language speaks single word rarely in response. Wife Dee Dee at bedside and RN  Required oxycodone IR 10 mg x1 for severe pain, IV Dilaudid 1 mg x1 for breakthrough pain within 24hrs period 8am-8am     DNR on chart: yes   DNI      Allergies    No Known Allergies    Intolerances    MEDICATIONS  (STANDING):  aspirin  chewable 81 milliGRAM(s) Oral daily  atorvastatin 40 milliGRAM(s) Oral at bedtime  bacitracin/polymyxin B Ointment 1 Application(s) Topical two times a day  cefepime   IVPB 1000 milliGRAM(s) IV Intermittent every 24 hours  chlorhexidine 2% Cloths 1 Application(s) Topical daily  clopidogrel Tablet 75 milliGRAM(s) Oral daily  Dakins Solution - 1/4 Strength 1 Application(s) Topical every 8 hours  DAPTOmycin IVPB 900 milliGRAM(s) IV Intermittent every 48 hours  epoetin aleah-epbx (RETACRIT) Injectable 84278 Unit(s) IV Push <User Schedule>  gabapentin 300 milliGRAM(s) Oral at bedtime  heparin   Injectable 5000 Unit(s) SubCutaneous every 8 hours  hydrOXYzine hydrochloride 25 milliGRAM(s) Oral three times a day  ibuprofen  Tablet. 400 milliGRAM(s) Oral every 12 hours  lactobacillus acidophilus 1 Tablet(s) Oral two times a day with meals  methocarbamol 750 milliGRAM(s) Oral every 8 hours  midodrine. 10 milliGRAM(s) Oral <User Schedule>  mupirocin 2% Ointment 1 Application(s) Topical <User Schedule>  Nephro-margaret 1 Tablet(s) Oral daily  oxyCODONE  ER Tablet 20 milliGRAM(s) Oral every 8 hours  pantoprazole    Tablet 40 milliGRAM(s) Oral before breakfast  polyethylene glycol 3350 17 Gram(s) Oral daily  povidone iodine 10% Solution 1 Application(s) Topical two times a day  senna 2 Tablet(s) Oral at bedtime    MEDICATIONS  (PRN):  benzocaine/menthol Lozenge 1 Lozenge Oral three times a day PRN Sore Throat  bisacodyl 5 milliGRAM(s) Oral every 12 hours PRN Constipation  HYDROmorphone  Injectable 1 milliGRAM(s) IV Push every 6 hours PRN severe breakthrough pain  oxyCODONE    IR 10 milliGRAM(s) Oral every 4 hours PRN Severe Pain (7 - 10)  sodium chloride 0.9% lock flush 10 milliLiter(s) IV Push every 1 hour PRN Pre/post blood products, medications, blood draw, and to maintain line patency      ITEMS UNCHECKED ARE NOT PRESENT    PRESENT SYMPTOMS: [x ]Unable to self-report - see [ ] CPOT [ ] PAINADS [ ] RDOS  Source if other than patient:  [ x]Family   [ x]Team     Pain:  [ ]yes [ ]no  QOL impact -   Location -                    Aggravating factors -  Quality -  Radiation -  Timing-  Severity (0-10 scale):  Minimal acceptable level (0-10 scale):     Dyspnea:                           [ ]Mild [ ]Moderate [ ]Severe  Anxiety:                             [ ]Mild [ ]Moderate [ ]Severe  Fatigue:                             [ ]Mild [ ]Moderate [ ]Severe  Nausea:                             [ ]Mild [ ]Moderate [ ]Severe  Loss of appetite:              [ ]Mild [ ]Moderate [ ]Severe  Constipation:                    [ ]Mild [ ]Moderate [ ]Severe      Chaplaincy Referral: [x ] yes [ ] refused [ ] following [ ] Deferred   Caregiver [place]? : [ x] yes [ ] no [ ] Deferred [ ] Declined             Social work referral [ ] Patient & Family Centered Care Referral [ ]   Anticipatory Grief present?:  [ ] yes [ ] no  [x ] Deferred                  Social work referral [ x] Chaplaincy Referral [ ]  		  Other Symptoms:  [ ]All other review of systems negative   [x] unable to assess due to altered mental status     Palliative Performance Status Version 2:   See PPSv2 tool and score below         PHYSICAL EXAM:  Vital Signs Last 24 Hrs  T(C): 37.2 (15 Apr 2025 09:40), Max: 37.2 (15 Apr 2025 09:40)  T(F): 98.9 (15 Apr 2025 09:40), Max: 98.9 (15 Apr 2025 09:40)  HR: 74 (15 Apr 2025 10:26) (72 - 87)  BP: 100/69 (15 Apr 2025 10:26) (91/51 - 119/71)  BP(mean): --  RR: 18 (15 Apr 2025 10:26) (17 - 18)  SpO2: 95% (15 Apr 2025 10:26) (92% - 95%)    Parameters below as of 15 Apr 2025 10:26  Patient On (Oxygen Delivery Method): room air     I&O's Summary    14 Apr 2025 07:01  -  15 Apr 2025 07:00  --------------------------------------------------------  IN: 0 mL / OUT: 500 mL / NET: -500 mL       GENERAL: [ ]Cachexia    [x ]Alert  [ ]Oriented   [ ]Lethargic  [ ]Unarousable  [x ]Verbal single words  [ ]Non-Verbal  Behavioral:   [ ]Anxiety  []Delirium [ ]Agitation [ ]Other  HEENT:  [ ]Normal   [ x]Dry mouth   [ ]ET Tube/Trach  [ ]Oral lesions  PULMONARY:   [ ]Clear [ ]Tachypnea  [ ]Audible excessive secretions   [ ]Rhonchi        [ ]Right [ ]Left [ ]Bilateral  [ ]Crackles        [ ]Right [ ]Left [ ]Bilateral  [ ]Wheezing     [ ]Right [ ]Left [ ]Bilateral  [ x]Diminished BS [ ] Right [ ]Left [x ]Bilateral  CARDIOVASCULAR:    [ x]Regular [ ]Irregular [ ]Tachy  [ ]Victor M [ ]Murmur [ ]Other  GASTROINTESTINAL:  [x ]Soft  [ ]Distended   [x ]+BS  [x ]Non tender [ ]Tender  [ ]Other [x ]PEG [ ]OGT/ NGT   Last BM: 4/15  GENITOURINARY:  [ ]Normal [ ]Incontinent   [ ]Oliguria/Anuria   [ ]Murillo [x] suprapubic cath   MUSCULOSKELETAL:   [ ]Normal   [ x]Weakness  [x ]Bed/Wheelchair bound [ ]Edema  NEUROLOGIC:   [ ]No focal deficits  [  Cognitive impairment  [ ] Dysphagia [x ]Dysarthria [ ] Paresis [ ]Other   SKIN: Please see RN and wound care documentation which I have reviewed.  Sacral, Rt Ischial, and Left Buttock wounds. BLE severe PAD w/ extensive wounds   [ ]Normal  [ ]Rash  [ ]Other  [ x]Pressure ulcer(s) [ x]y [ ]n present on admission    CRITICAL CARE:  [ ]Shock Present  [ ]Septic [ ]Cardiogenic [ ]Neurologic [ ]Hypovolemic  [ ]Vasopressors [ ]Inotropes  [ ]Respiratory failure present [ ]Mechanical Ventilation [ ]Non-invasive ventilatory support [ ]High-Flow   [ ]Acute  [ ]Chronic [ ]Hypoxic  [ ]Hypercarbic [ ]Other  [ x]Other organ failure - skin, kidney     LABS:                        11.5   16.69 )-----------( 278      ( 15 Apr 2025 10:37 )             38.4   04-15    133[L]  |  97  |  22  ----------------------------<  153[H]  5.1   |  22  |  3.36[H]    Ca    7.6[L]      15 Apr 2025 10:37  Phos  4.6     04-15  Mg     2.0     04-15        Urinalysis Basic - ( 15 Apr 2025 10:37 )    Color: x / Appearance: x / SG: x / pH: x  Gluc: 153 mg/dL / Ketone: x  / Bili: x / Urobili: x   Blood: x / Protein: x / Nitrite: x   Leuk Esterase: x / RBC: x / WBC x   Sq Epi: x / Non Sq Epi: x / Bacteria: x      RADIOLOGY & ADDITIONAL STUDIES:  Xray Chest 1 View- PORTABLE-Urgent (Xray Chest 1 View- PORTABLE-Urgent .) (04.09.25 @ 17:11) >  FINDINGS:  Right IJ central venous catheter terminating in the cavoatrial junction.  Elevated right hemidiaphragm.  The heart is unchanged in size.  The lungs are clear.  There is no pneumothorax or pleural effusion.    IMPRESSION:  Clear lungs.     CT Head No Cont (04.15.25 @ 23:03) >  Mild chronic microvascular changes without evidence of an   acute transcortical infarction or hemorrhage.    CT Chest No Cont (04.15.25 @ 23:03) >    IMPRESSION:  Tracheobronchial secretions with multifocal impaction and likely   superimposed infectious/pulmonary process.        Protein Calorie Malnutrition Present: [ ]mild [ ]moderate [ ]severe [ ]underweight [ ]morbid obesity  https://www.andeal.org/vault/8351/web/files/ONC/Table_Clinical%20Characteristics%20to%20Document%20Malnutrition-White%20JV%20et%20al%202012.pdf    Height (cm): 172.7 (04-08-25 @ 15:25), 172.7 (02-04-25 @ 16:31), 172.7 (12-20-24 @ 12:36)  Weight (kg): 90.7 (04-08-25 @ 15:25), 90.7 (02-04-25 @ 16:31), 94.3 (12-20-24 @ 12:36)  BMI (kg/m2): 30.4 (04-08-25 @ 15:25), 30.4 (02-04-25 @ 16:31), 31.6 (12-20-24 @ 12:36)    [ x]PPSV2 < or = 30%  [ ]significant weight loss [ ]poor nutritional intake [ ]anasarca[ ]Artificial Nutrition    Other REFERRALS:  [ ]Hospice  [ ]Child Life  [ ]Social Work  [ x]Case management [ ]Holistic Therapy                                        Progress Notes    PROGRESS NOTE  Date & Time of Note   2025-04-14 04:36    Notes    Notes: Chart reviewed. Patient remains medically acute, continues on IV ABT  Cefepime and daptomycin. DCP unclear. Spoke with daughter/hcp Cori and she  wants a GOC conversation to discuss realistic goals. ACP aware. Consult placed.  Will continue to follow with interdisciplinary team.       Electronically signed by:  Alexandra Gutiérrez RN  Electronically signed on:  2025-04-14  16:45

## 2025-04-15 NOTE — PROGRESS NOTE ADULT - ASSESSMENT
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  Requiring debridement of sacral wound.  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  Requiring debridement of sacral wound.  Palliative reconsulted for goals of care

## 2025-04-15 NOTE — PROGRESS NOTE ADULT - SUBJECTIVE AND OBJECTIVE BOX
[facility]  SABINO Griffin Y. Patel, S. Shah, G. Casimir  [phone]  ([phone] - weekdays after 5pm and weekends)    Name: BRIAN DEMPSEY  Age/Gender: 75y Male  MRN: [id number]    Interval History:  Patient seen and examined this morning.   Feels tired, no new complaints, feels pain controlled.   Notes reviewed, events/RRTs noted. Afebrile   Allergies: No Known Allergies      Objective:  Vitals:   T(F): 98.9 (04-15-25 @ 09:40), Max: 98.9 (04-15-25 @ 09:40)  HR: 74 (04-15-25 @ 10:26) (72 - 93)  BP: 100/69 (04-15-25 @ 10:26) (91/51 - 120/78)  RR: 18 (04-15-25 @ 10:26) (17 - 18)  SpO2: 95% (04-15-25 @ 10:26) (92% - 95%)  Physical Examination:  General: no acute distress, nontoxic  HEENT: normocephalic, atraumatic, anicteric  Respiratory: no acc muscle use, breathing comfortably  Cardiovascular: S1 and S2 present  Gastrointestinal: soft, nontender, nondistended, SPC  Extremities: b/l lower extremity dressings     Laboratory Studies:  CBC:   WBC Trend:  9.32 04-12-25 @ 10:10  11.15 04-11-25 @ 09:03  8.84 04-10-25 @ 09:30    CMP:   Creatinine: 2.74 mg/dL (04-12-25 @ 10:10)  Creatinine: 3.43 mg/dL (04-11-25 @ 09:03)  Creatinine: 2.92 mg/dL (04-10-25 @ 09:30)  Creatinine: 3.72 mg/dL (04-09-25 @ 09:41)    Microbiology: reviewed   Culture - Fungal, Other (collected 04-08-25 @ 20:58)  Source: Other Sacral Wound Swab  Preliminary Report (04-12-25 @ 23:03):    Culture is being performed. Fungal cultures are held for 4 weeks.    Culture - Acid Fast - Other w/Smear (collected 04-08-25 @ 20:58)  Source: Other Sacral Wound Swab  Preliminary Report (04-12-25 @ 23:07):    Culture is being performed.    Culture - Wound Aerobic (collected 04-08-25 @ 20:58)  Source: Other Sacral Wound Swab  Final Report (04-12-25 @ 01:17):    Few Pseudomonas aeruginosa    Rare Escherichia coli (Carbapenem Resistant)    Numerous Enterococcus faecium (vancomycin resistant)  Organism: Pseudomonas aeruginosa  Escherichia coli (Carbapenem Resistant)  Enterococcus faecium (vancomycin resistant) (04-12-25 @ 01:17)  Organism: Escherichia coli (Carbapenem Resistant) (04-12-25 @ 01:17)      -  Levofloxacin: R >4      -  Tobramycin: S <=2      -  Ceftazidime/Avibactam: S <=4      -  Aztreonam: R >16      -  Gentamicin: S <=2      -  Cefazolin: R >16      -  Cefepime: SDD 4 The breakpoint for susceptible dose dependent may require the usage of a higher cefepime dose (equivalent to adult dose of 2g q8h for normal renal function) for successful treatment.      -  Piperacillin/Tazobactam: R 32      -  Ciprofloxacin: R >2      -  Imipenem: I 2      -  Ceftriaxone: R 32      -  Ampicillin: R >16 These ampicillin results predict results for amoxicillin      Method Type: PRIMITIVO      -  Meropenem: S <=1      -  Ampicillin/Sulbactam: R >16/8      -  Amoxicillin/Clavulanic Acid: R >16/8      -  Trimethoprim/Sulfamethoxazole: S 1/19      -  Ceftolozane/tazobactam: I 4      -  Ertapenem: I 1  Organism: Escherichia coli (Carbapenem Resistant) (04-12-25 @ 01:17)      -  Resistance Gene to Carbapenem: Detec      Method Type: CarbaR      -  Resistance Gene KPC: Detec  Organism: Enterococcus faecium (vancomycin resistant) (04-12-25 @ 01:17)      -  Daptomycin: SDD 2 The breakpoint for SDD (susceptible dose dependent)is based on a dosage regimen of 8-12 mg/kg administered every 24 h in adults and is intended for serious infections due to E. faecium. Consultation with an infectious diseases specialist is recommended.      -  Linezolid: S <=1      -  Vancomycin: R >16      -  Ampicillin: R >8 Predicts results to ampicillin/sulbactam, amoxacillin-clavulanate and  piperacillin-tazobactam.      Method Type: PRIMITIVO  Organism: Pseudomonas aeruginosa (04-12-25 @ 01:17)      -  Levofloxacin: R >4      -  Aztreonam: R >16      -  Cefepime: S 8      -  Piperacillin/Tazobactam: S 16      -  Ciprofloxacin: R >2      -  Imipenem: S <=1      Method Type: PRIMITIVO      -  Meropenem: S <=1      -  Ceftazidime: S 8    Radiology: reviewed     Medications:  aspirin  chewable 81 milliGRAM(s) Oral daily  atorvastatin 40 milliGRAM(s) Oral at bedtime  bacitracin/polymyxin B Ointment 1 Application(s) Topical two times a day  benzocaine/menthol Lozenge 1 Lozenge Oral three times a day PRN  bisacodyl 5 milliGRAM(s) Oral every 12 hours PRN  cefepime   IVPB 1000 milliGRAM(s) IV Intermittent every 24 hours  chlorhexidine 2% Cloths 1 Application(s) Topical daily  clopidogrel Tablet 75 milliGRAM(s) Oral daily  Dakins Solution - 1/4 Strength 1 Application(s) Topical every 8 hours  DAPTOmycin IVPB 900 milliGRAM(s) IV Intermittent every 48 hours  epoetin aleah-epbx (RETACRIT) Injectable 55009 Unit(s) IV Push <User Schedule>  gabapentin 300 milliGRAM(s) Oral at bedtime  heparin   Injectable 5000 Unit(s) SubCutaneous every 8 hours  HYDROmorphone  Injectable 1 milliGRAM(s) IV Push every 6 hours PRN  hydrOXYzine hydrochloride 25 milliGRAM(s) Oral three times a day  ibuprofen  Tablet. 400 milliGRAM(s) Oral every 12 hours  lactobacillus acidophilus 1 Tablet(s) Oral two times a day with meals  methocarbamol 750 milliGRAM(s) Oral every 8 hours  midodrine. 10 milliGRAM(s) Oral <User Schedule>  mupirocin 2% Ointment 1 Application(s) Topical <User Schedule>  Nephro-margaret 1 Tablet(s) Oral daily  oxyCODONE    IR 10 milliGRAM(s) Oral every 4 hours PRN  oxyCODONE  ER Tablet 20 milliGRAM(s) Oral every 8 hours  pantoprazole    Tablet 40 milliGRAM(s) Oral before breakfast  polyethylene glycol 3350 17 Gram(s) Oral daily  povidone iodine 10% Solution 1 Application(s) Topical two times a day  senna 2 Tablet(s) Oral at bedtime  sodium chloride 0.9% lock flush 10 milliLiter(s) IV Push every 1 hour PRN    Current Antimicrobials:  cefepime   IVPB 1000 milliGRAM(s) IV Intermittent every 24 hours  DAPTOmycin IVPB 900 milliGRAM(s) IV Intermittent every 48 hours    Prior/Completed Antimicrobials:  piperacillin/tazobactam IVPB.  piperacillin/tazobactam IVPB.  piperacillin/tazobactam IVPB.-  piperacillin/tazobactam IVPB.-  trimethoprim   80 mG/sulfamethoxazole 400 mG  trimethoprim  160 mG/sulfamethoxazole 800 mG  vancomycin  IVPB  vancomycin  IVPB

## 2025-04-15 NOTE — PROGRESS NOTE ADULT - ASSESSMENT
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 On HD now    (2)Hyponatremia - controlled with high-Na+ bath with HD    (3)Anemia - s/p IV iron; on Retacrit with HD    (4)PAD - s/p LE bypass 3/3/25     (5)CV - tenuous hemodynamics of late to point where he is not able to get opiates at times - best that we cut back on the UF; s/p 500cc NS bolus and midorine 20 x 1 last night     (6)ID - infected sacral ulcer - s/p debridement late last week; now on IV Cefepime + Dapto    RECOMMEND:   (1)Next HD tomorrow - 0.5kg UF as able; Midodrine pre-HD  (2)Follow up CXR  (3)Continue abx for GFR<10/HD    Beryl Darden, NAFISA  [facility]  [phone]

## 2025-04-15 NOTE — PROGRESS NOTE ADULT - PROBLEM SELECTOR PLAN 3
multiple wounds: largest sacral wound s/p debridement on 4/8, multiple wounds on RLE and LLE     -Wound care following   -pain management following   - Advise to premedicate with IV Dilaudid 15 min prior of wound clean   - management as per primary team and wound care CKD5, HD was started during this admission   - On midodrine for hypotension   - Nephrology following   - management as per primary team

## 2025-04-15 NOTE — PROGRESS NOTE ADULT - PROBLEM SELECTOR PLAN 1
s/p RRT on 4/15     -noted to be altered   -recommend work up for AMS Possible multifactorial in the setting of renal failure, uncontrolled pain, prolong hospital stay.   - s/p RRT on 4/15   - CT Head No Cont (04.15.25 @ 23:03) Mild chronic microvascular changes without evidence of an acute transcortical infarction or hemorrhage  - Monitor for constipation, urinary retention, pain, hunger, thirst, etc.  Promote sleep wake cycle and reorientation as indicated.  - management as per primary team

## 2025-04-15 NOTE — PROGRESS NOTE ADULT - NS PRO AD PATIENT TYPE
Health Care Proxy (HCP)/Medical Orders for Life-Sustaining Treatment (MOLST)/Psychiatric Advance Directives (PAD)

## 2025-04-15 NOTE — PROGRESS NOTE ADULT - ASSESSMENT
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 He is well known to me from multiple recent admisions  s/p admissions at [facility] 12/20-12/25/24 and 2/4-2/7 with SONDRA on CKD with associated uremic symptoms.   At the last admission he had expressed strong interest to try to hold off with HD intiation but he has been getting worse clinically at home.  His SONDRA and uremic symptoms significantly improved after the first admission; they improved a to mild extent during the 2nd admission to the point where he was able to be discharged without HD. Since then, however, he has worsened further.   He has been suffering from progressively worsening diffuse pruritus, loss of appetite, and generalized weakness and falls.   His nephrologist and PCP has been speaking with him and his family over the past few days,   The initial plan was that he would present to the [facility] ER Monday 2/17 (ie tomorrow) for HD initiation. Today, however, he fell and sustained trauma to his knee. Given the fall and concern for knee fracture, he presented to the ER today. multiple xrays show no Fractures.      CKD5, uremia, requiring initiation of HD  Rash, seborrheic dermatitis  Pruritis  Anemia  PAD  SP catheter, chronic  Left inferior pole acute on chronic patella Fx    plan  - HD via R subclavian permacath  -4/15: ptn is altered, has myoclonic jerks, he states he is in severe pain, ptn wofe, daughter palliaitive care and wound care had a 45 min discussion about prognosis and GOC. the daughter asked wound care if the ptn can be placed in an induced coma in order to do proper wound care and pain control. wound care team explained that is not a viable option. ptn is eating, but is not optimized nutritionally for better wound healing. he is also a high risk of complications from TPN. cont HD as per renal, ptn is on Midodrine for hypotension. wbc phoebe today, will do blood cx x 2. ABx as per ID. Neuro following. will get Head and chest CT  -4/14: ptn is awaiting to go to HD, extensive conversation w daughter yanique about GOC, ptn has new sacral wounds despite getting turned, the present sacaral wound are healing, getting daily dressing changes. daughter wants to talk to palliaitive and wound care re prognosis and GOC meeting. wound care attending made aware to call the daughter, spoke ramses Fontenot ACP to place Palliative re-eval re GOC. colt to yanique x 45 min re hospital course and she expressed a possibility of stopping HD and proceed w hospice.   -4/13: ptn is in good spirits, he was transferred 2/2 requiring an isolation room 2/2 growing VRE, CR E.coli, in addition to P.aeruginosa in sacral wound. wound dressing changed last night and this am, pain is controlled. wife at bedside. i reiterated to the ptn and the wife that refusing dressing changes will put him back and the wound will get worse. the ptn understands and stated he will not refuse dressing changes in the future.   -4/12: ongoing severe pain at sacral decubiti, wound care done by nursing as per wound care and surgery recs. ABx changed to Daptomycin and Cefepime. Meropenem was DCed  - 4/11:  ptn is awake, alert, has pain in LE, sacral pain is controlled. after a conversation about pain management and his history re debility starting in youth, ptn asked me to pay in the bed w him. i felt uncomfortable and excused myself. wife at the bedside. ptn is AxOx4. i told him the comment was not appropriate and the ptn laughed it off and asked why. as far as wound care, ptn has been refusing to have dressing changed post op. he has fecal incontinence.   -4/10:  pain is controlled, on Hemal for P. aeruginosa growing in sacral decub debridement Cx.ptn is DNR/DNI, was seen by palliaitive care. being followed by pain for pain management in LE and 2/2 sacral decubiti.  awaiting dressing change by surgical team.   -4/9: s/p debridement in OR by general surgery. pain consult and palliative consult reviewed. pain regimen reviewed w pain team.  they d/w ptn and daughter. ptn refused for the dressing to be changed by surgical team today. they tried several times. wound care post op is very important in healing process. meropenem restarted  -4/8: ptn is awaiting debridement in the OR today, debulking and wound care at the bedside has been challenging 2/2 pain not allowing 2/2 pain and fear of pain  - 4/7: ptn complaining his pain meds are not covering his pain completely. yesterday he was talking about death a lot and how he wished to die. had psych eval today. today he is doing better emotionally, not talking about drath and wants to cont treatment. he is tolerating HD, he finished ABx for PNA last week. his wounds are the primary reason of admission at this point.  I also called palliaitive consult.   He was seen by surgery for debridement of sacral decub, ptn is refusing diverting colostomy. on admission ptn had intact skin but did show primary stages of pressure injury in the sacral region. ptn didnt allow us to turn him 2/2 pain in LLE for several days, despite our concern of further pressure injury on the sacrum. once he was turned we discovered skin break and started wound care. again he would not allow us to turn him. he said the wound is ok, its scabbed, Dee Dee( his wife) is handling it. I called wound care to see him, wound care discovered that the area had necrosis, not scabs, debulking at the bedside was performed, ptn complained it was extremely painful. ptn was seen by wound care attending and recommended debridement in the OR under anesthesia. it is planned for 4/9. it was also recommended he should have a diverting colostomy. ptn is refusing it. HCP his daughter Yanique aware.   -4/6: spoke w ptn's daughter Yanique on the phone today x 20 min, spoke to ptn , his wife and daughter yanique at the bedside x 60 min today. ptn is agreeing to debridement in the OR, adamantly refusing diverting colostomy he would consider it if post debridement he would not improve and would require another debridement. ptn was seen by urology and ID bc daughter complained increased amount of "pus" at the SPT, both ID and urology feel there is no acute infection/cellulitis, the drainage at the site and expected and not unusual. daughter and wife state ptn didnt have sacral wound when he came. on admission with the aide of his wife we flipped him and he had 1st degree pressure decubiti sacrally, skin was intact.   - 4/5: thorough eval by wound care attending 4/4/25, case d/w wound care attending Dr Dugan. as per her recommendation placed general surgery consult for OR debridement and diverting colostomy. ptn had sacral decubiti prior to admission  -4/4: dr dugan from wound care had an extensive conversation w the ptn and daughter yanique on the phone at bedside. ptn needs general surgery consult for OR debridement of necrotic tissue of the sacral decubiti and diverting colostomy. ptn did not tolerate debridement at the bedside. . completed Abx for PNA  -4/3: ptn's insurance changed to straight medicare/medicaid. now able to go to [facility], but ptn wants to go home. will need equipment set up. this was d/w CM and ACP, daughter Yanique on the phone and wife at bedside today. today is last day of ABx. will transition pain meds to po DIlaudid from IV, will also need outptn pain management F/U, outptn wound care, home PT, need outptn HD set up  -4/2: seen prior to going to HD, no new events. tomorrow completing 10 day course of Meropenem. ongoing need for narcotics 2/2 pain.   -4/1: ptn is frustrated about a prolonged hospitalization but states he feels better overall and once medically cleared he will be ready to go home, not sooner than 4/7. completing ABx on 4/3.   -3/31: at HD today had 1.7 L UF, post HD was hypotense, gave gentle IVF , BP still a bit low. afebrile, loose BMs but not diarrhea, GI PCR not sent, RVP neg. . on Meropenem course for total of 10 days, last day 4/3, would care for decubiti and left knee wound post trauma  -3/30: Tmax 100.9, c/o dry cough and diarrhea but not watery. pain is controlled at the time of visit. wife at bedside. will check RVP panel, GI PCR, sine diarrhea non watery will not order C. Diff. ID to follow  - 3/29: cont meropenem, seen by coverage  - 3/28: pain from decubitus ulcer and Left knee post debridement continues. wound care recs in place. on Meropenem for PNA, pulm and ID following. HD as per renal. HDS  -3/27: ptn is complaining that pain post decubital and left knee debridement has been severe. his daughter is on speaker phone, wife is at bedside. his pain is controlled when meds are administered. he is very uncomfortable due to the pain at the site of decubitus debridement. lyrica helped his pain before but made him drowsy, griffin with gabapentin. he doesnt want them to be resumed. will cont dilaudid, oxycodone, oxycontin, ibuprofen. he is on Meropenem for aspiration PNA/HCAP. [facility] [place]. seen by vascular cardiology to address R IJ post shiley non occlusive DVT. full AC was not recommended. will check rpt doppler in 3-4 weeks.   - 3/26: Wound care performed bedside debridement of Left knee wound 2/2 lifted eschar, and sacral decubitus. findings c/w possible infection and mainly fat necrosis. cont Meropenem. ID following. podiatry following for gangrenous toes. will give additional single dose IV DIlaudid 2/2 severe pain post debridement.    - 3/25: wife at bedside, daughter on speaker phone at bedside. ptn is pain free at the time of visit and states he wants to cont the pain regiment he is presently on. ct C/A/P revealed: RLL PNA, prob aspiration, stercoral proctitis, decubitus ulcer w possible progression to sacral OM, R IJ nonocclusive DVT. these findings d/w ptn ,his family, wound care, ACP, Vascular, Nephrology, ID, pUlm, GI. meropenem initiated, Bactrim stopped. ptn states when he is cleared for DC, he wants to go home , not to [facility], not to SNF. daughter and wife aware.   -3/24: ptn  was seen by GI for N/V, its not clear the etiology, will obtain Ct C/A/P. ptn states he is coughing up green mucus as well  -3/23:  ptn is in good spirits, says he vomited "spit" this am, but tolerated all meals without vomiting. no abd pain, no undigested food in the vomit. afebrile, no diarrhea, RVP neg. GI consulted.   -3/21-22: ptn feels well.  pain is controlled. still no accepting facility for [facility]  -3/20: ptn states he has severe low back and LLE pain though overall is improving.   -3/19: ptn w tan crusting around SPC , states its painful. started on po Bactrim, renally dosed by ID for cellulitis.   -3/18: LLE paraesthesias are chronic, will d/w CM re dc planning to [facility]    -3/17: ptn states he is lethargic, he has LLE numbness which is new and severe pain at SPC insertion site. this was ID, d/w neuro, renal and vascular. as per ID: no sign of an acute infection recommend CT A/P.   -3/16:  urine cx from 3/15 NTD, zosyn DCed, awaiting dc to [facility], not sure will be able to go to [facility] since there is an insurance coverage conflict. HD as per renal  - 3/15: spoke w ptn's daughter today re denial from [place] for [facility]. p[facility], which [place] doesnt accept. i recommended to speak  to Cm and to the insurance company to find participating facilities. ptn is stable today. pain and erythema at SP catheter site has resolved today. seen by ID, will cont ZOSYN for now. UCx sent.   - 3/14: suprapubic tube changhed by , ptn has crusting at the insertion site and pain. will start Abx, urine always has sediment, will sent for cx, start Vanco/ZOsyn. ID consult called. check MRSA/MSSA PCR  -3/13: ptn is doing well. ptn has an accepting rehab facility, now pending AUTH.   -3/12: ptn is no longer constipated. doing well off prn IV DIlaudid. awaiting dc to [facility]  -3/11: ptn has no new c/o other than feeling constipated, lactulose ordered. also in preparation for [facility] will dc prn IV DIlaudid, cont prn OXy IR  -3/10:  ptn is awake, alert, wife at bedside, i instructed them to give rehab choices. also awaiting SPC change to be done by urology. pain is controlled  -3/9: seen by PT, will refer to [facility]. choices to be given in AM. this was d/w ptn, daughter, wife.   -3/8:  ptn didnt want to get PT eval done, will reorder. ptn needs [facility] placement. PMNR consult ordered  - 3/7: ptn is alert , pain is controlled, DC planning d/w ptn and HCP, daughter Yanique  -3/6:  ptn is awake, alert, ecchymotic feet look improved, pain is controlled. DC planning to [facility]  3/4-5 - s/p b/l iliac arteries angioplasty and stent placements on 3/3. today has new ecchymoses in b/l LE, concern for arterial insufficiency. vascular aware.. wound from Left knee immobilizer is dressed and healing. pain is controlled.   LEFT UE AVF placement/scheduling will be on outptn basis as per vascular. dc planning to [facility]  - 3/3: ptn is s/p angiogram RLE : b/l iliac arteries w successful angioplasty and stents. in PACU. awaitng HD.  ptn has a pressure wound from the LLE immobilizer posteriorly proximal to the knee. its been on 2/2 knee fracture, applied by ortho and managed by ptn's wife as per ptn's and wife's request , this was d/w nursing and nurse manager ms Ruiz.. immobilizer should be managed by orthopedics and nursing. will keep it off, only keep on when repositioning for care and then remove. wound care to F/U . this was discussed at length w daughter Yanique and wife Dee Dee.   -3/1-3/2: ptn is smiling, pain free, had HD 2/28 and 3/1, awaiting RLE angiogram 3/3, on Heparin drip, euvolemic  -2/28: ptn is lethargic, awaiting RLE angiogram possible fem-fem bypass hopefully on 3/3 pending OR availability, awaiting HD today    -2/27:  plan for angiogram in am with possible angioplasty, possible stent, possible fem-fem bypass. medically cleared for angiogram and possible surgery, stent, angioplasty. pain is controlled. remains on heparin drip as per vascular. HD as per renal    -2/26:  ptn is sleeping, pain is controlled, he was seen by wound care and vascular attending. planning on angiogram 2/2 severe PAD in LE on CTA. he also spoke to HCP. ptn and HCP in agreement. ptn was started on HEPARIN drip as per vascular recs. RIJ permacath done 2/25    - 2/25: ptn states he is hallucinating, but not at present, his MS is at baseline, his pain is controlled, he still needs prn pain meds when he is moved in the bed or for testing or for HD. awaiting Permacath, AVF creation, LE bypass to be d/w Dr. Swenson and the ptn tomorrow. ptn has cardiology clearance  if he opts for. Ptn has sacral decubiti and posterior thigh and buttock decibiti and b/l heel decubiti. Z flow bootie d/w RN, get wound care consult    -2/24: pain is controlled  if the LLE is immobile and fully extended. doesn't tolerate the knee immobilizer. has a medium condyle and acute on chronic patella fx in LEFT knee. as per vascular ptn will need iliac stent  w a fem-fem bypass, possible axillo-femoral bypass. for this surgery he would need cardiac work up . more pressing surgery is LUE AVF creation,  in IR will arrange for Permacath. for pain control: Motrin 400 mg q12H, Oxy ER 30 mg q12H, Oxy IR 10 for mod pain and dilaudid 2 mg iv for severe pain. still has pruritis though improved, will raise atarax 25 mg to tid. plan of care d/w daughter Norma Persaud , ptn and his wife. Also d/w renal, card, vascular, ACP    -2/23: Daughter Yanique is the HCP, she and the ptn filled out the paperwork. daily plan and findings d/w her and the ptn. MS is at abseline, c/o b/l LE foot pain and Left Knee pain. xrays of left knee: cannot r/o acute on chronic inferior pole patellar Fx. knee immobilizer is on, awaiting ortho consult. doubt needs any intervention awaiting Ct chest today, will add CT Left knee. ptn states itching has recurred, severe pain has recurred. will resume Atatrax ( 25 mg bid) and Oxycodone ER , but at a lower dose 15 mg q12H. cont prn analgesics. HD as per renal, awaiting Vascular consult f/u re CTA A/L &LE and recs. ptn also wants AVF surgery on this admission. Coughing has stopped    - 2/22: ptn is drowsy but arousable, calmer today, answers questions appropriately. he is pain free, he stopped coughing, he denies having pruritis: will DC:  OXY ER, Atarax, Tessalon perles.     -  2/21: ptn is tearful, a bit confused, coughing, recognizes me and family at bedside. GOC d/w daughter and wife. AMS prob delirium 2/2 acute illness +/- opiods, lyrica, atarac. will lower atarak to tid, dc lyrica, cont Oxy 2/2 ptn has severe LE pain. neuro called for eval. Head ct done yesterday w no acute findings. CTA A/P w LE run fof: severe PAD, vascular to follow up on plan fo care. plan for HD tomorrow and next week place on tiw schedule of MWF. will need tunneled catheter prior to DC and will d/w vascular scheduling of AVF. ptn wants all to be done while inptn. daughter wants to be HCP as per ptn's wishes. she was given paperwork to get it signed and witnessed and will present to nursing thereafter. details of findings and complaints and plan of care d/w daughter and wife. spent 60 min. RVP ordered. start mucinex , tessalon perles, duonebs, get CT chest to r/o PNA. pulm called    -  2/20:  ptn had RRT 2/2 AMS and tremors. no SZ, no LOC. noted to have Na 123( hyponatremia), given 500 cc NS. Gabapentin DCed. ptn had been taking gabapentin at home PTA. Pain is controlled. Pruritis resolved, tolerating HD otherwise.     - Pain management:  cont Oxycodone 10 IR q6H,  DIlaudid prn severe pain 2 mg q4H prn.   - cont outptn meds  - DVT ppx w HSC

## 2025-04-15 NOTE — PROGRESS NOTE ADULT - PROBLEM SELECTOR PLAN 6
Palliative reconsulted for assistance with complex medical decision making     Recommend work up for altered mental status, follow up with pain management for pain control     Palliative to continue to follow Palliative care team re-consulted for goals of care. Discussed with RN, wound care attending and PA, and primary team.   Chaplaincy consulted  Holistic RN consulted

## 2025-04-15 NOTE — PROGRESS NOTE ADULT - SUBJECTIVE AND OBJECTIVE BOX
Neurology Progress Note    SUBJECTIVE/OBJECTIVE/INTERVAL EVENTS:   -RRT called for AMS evening of 4/14/25. Pt was initially unresponsive but became responsive after sternal rubbed, was answering questions appropriately immediately.  -C/f for myoclinic jerks as well? Will confirm with family at bedside.     MEDICATIONS  (STANDING):  aspirin  chewable 81 milliGRAM(s) Oral daily  atorvastatin 40 milliGRAM(s) Oral at bedtime  bacitracin/polymyxin B Ointment 1 Application(s) Topical two times a day  cefepime   IVPB 1000 milliGRAM(s) IV Intermittent every 24 hours  chlorhexidine 2% Cloths 1 Application(s) Topical daily  clopidogrel Tablet 75 milliGRAM(s) Oral daily  Dakins Solution - 1/4 Strength 1 Application(s) Topical every 8 hours  DAPTOmycin IVPB 900 milliGRAM(s) IV Intermittent every 48 hours  epoetin aleah-epbx (RETACRIT) Injectable 42219 Unit(s) IV Push <User Schedule>  gabapentin 300 milliGRAM(s) Oral at bedtime  heparin   Injectable 5000 Unit(s) SubCutaneous every 8 hours  hydrOXYzine hydrochloride 25 milliGRAM(s) Oral three times a day  ibuprofen  Tablet. 400 milliGRAM(s) Oral every 12 hours  lactobacillus acidophilus 1 Tablet(s) Oral two times a day with meals  methocarbamol 750 milliGRAM(s) Oral every 8 hours  midodrine. 10 milliGRAM(s) Oral <User Schedule>  mupirocin 2% Ointment 1 Application(s) Topical <User Schedule>  Nephro-margaret 1 Tablet(s) Oral daily  oxyCODONE  ER Tablet 20 milliGRAM(s) Oral every 8 hours  pantoprazole    Tablet 40 milliGRAM(s) Oral before breakfast  polyethylene glycol 3350 17 Gram(s) Oral daily  povidone iodine 10% Solution 1 Application(s) Topical two times a day  senna 2 Tablet(s) Oral at bedtime    MEDICATIONS  (PRN):  benzocaine/menthol Lozenge 1 Lozenge Oral three times a day PRN Sore Throat  bisacodyl 5 milliGRAM(s) Oral every 12 hours PRN Constipation  HYDROmorphone  Injectable 1 milliGRAM(s) IV Push every 6 hours PRN severe breakthrough pain  oxyCODONE    IR 10 milliGRAM(s) Oral every 4 hours PRN Severe Pain (7 - 10)  sodium chloride 0.9% lock flush 10 milliLiter(s) IV Push every 1 hour PRN Pre/post blood products, medications, blood draw, and to maintain line patency      VITALS & EXAMINATION:  Vital Signs Last 24 Hrs  T(C): 37.2 (15 Apr 2025 09:40), Max: 37.2 (15 Apr 2025 09:40)  T(F): 98.9 (15 Apr 2025 09:40), Max: 98.9 (15 Apr 2025 09:40)  HR: 74 (15 Apr 2025 10:26) (72 - 87)  BP: 100/69 (15 Apr 2025 10:26) (91/51 - 119/71)  BP(mean): --  RR: 18 (15 Apr 2025 10:26) (17 - 18)  SpO2: 95% (15 Apr 2025 10:26) (92% - 95%)    Parameters below as of 15 Apr 2025 10:26  Patient On (Oxygen Delivery Method): room air    Physical Exam: INCOMPLETE    LABORATORY:  CBC                       11.5   16.69 )-----------( 278      ( 15 Apr 2025 10:37 )             38.4     Chem 04-15    133[L]  |  97  |  22  ----------------------------<  153[H]  5.1   |  22  |  3.36[H]    Ca    7.6[L]      15 Apr 2025 10:37  Phos  4.6     04-15  Mg     2.0     04-15      LFTs   Coagulopathy   Lipid Panel   A1c   Cardiac enzymes     U/A Urinalysis Basic - ( 15 Apr 2025 10:37 )    Color: x / Appearance: x / SG: x / pH: x  Gluc: 153 mg/dL / Ketone: x  / Bili: x / Urobili: x   Blood: x / Protein: x / Nitrite: x   Leuk Esterase: x / RBC: x / WBC x   Sq Epi: x / Non Sq Epi: x / Bacteria: x      CSF  Immunological  Other    STUDIES & IMAGING: (EEG, CT, MR, U/S, TTE/LYLA): Neurology Progress Note    SUBJECTIVE/OBJECTIVE/INTERVAL EVENTS:   -RRT called for AMS evening of 4/14/25. Pt was initially unresponsive but became responsive after sternal rubbed, was answering questions appropriately immediately.  -Family reporting jerking movements, they are brief and can happen all over body, happen frequently while awake. Pt is awake during these episodes, does not lose consciousness. Apparently they happened in the past, went away, but then re-appeared ~3 days ago. Family thinks they could be related to some of the pain medications he is receiving (Oxycodone, Dilaudid, Gabapentin).     MEDICATIONS  (STANDING):  aspirin  chewable 81 milliGRAM(s) Oral daily  atorvastatin 40 milliGRAM(s) Oral at bedtime  bacitracin/polymyxin B Ointment 1 Application(s) Topical two times a day  cefepime   IVPB 1000 milliGRAM(s) IV Intermittent every 24 hours  chlorhexidine 2% Cloths 1 Application(s) Topical daily  clopidogrel Tablet 75 milliGRAM(s) Oral daily  Dakins Solution - 1/4 Strength 1 Application(s) Topical every 8 hours  DAPTOmycin IVPB 900 milliGRAM(s) IV Intermittent every 48 hours  epoetin aleah-epbx (RETACRIT) Injectable 03627 Unit(s) IV Push <User Schedule>  gabapentin 300 milliGRAM(s) Oral at bedtime  heparin   Injectable 5000 Unit(s) SubCutaneous every 8 hours  hydrOXYzine hydrochloride 25 milliGRAM(s) Oral three times a day  ibuprofen  Tablet. 400 milliGRAM(s) Oral every 12 hours  lactobacillus acidophilus 1 Tablet(s) Oral two times a day with meals  methocarbamol 750 milliGRAM(s) Oral every 8 hours  midodrine. 10 milliGRAM(s) Oral <User Schedule>  mupirocin 2% Ointment 1 Application(s) Topical <User Schedule>  Nephro-margaret 1 Tablet(s) Oral daily  oxyCODONE  ER Tablet 20 milliGRAM(s) Oral every 8 hours  pantoprazole    Tablet 40 milliGRAM(s) Oral before breakfast  polyethylene glycol 3350 17 Gram(s) Oral daily  povidone iodine 10% Solution 1 Application(s) Topical two times a day  senna 2 Tablet(s) Oral at bedtime    MEDICATIONS  (PRN):  benzocaine/menthol Lozenge 1 Lozenge Oral three times a day PRN Sore Throat  bisacodyl 5 milliGRAM(s) Oral every 12 hours PRN Constipation  HYDROmorphone  Injectable 1 milliGRAM(s) IV Push every 6 hours PRN severe breakthrough pain  oxyCODONE    IR 10 milliGRAM(s) Oral every 4 hours PRN Severe Pain (7 - 10)  sodium chloride 0.9% lock flush 10 milliLiter(s) IV Push every 1 hour PRN Pre/post blood products, medications, blood draw, and to maintain line patency      VITALS & EXAMINATION:  Vital Signs Last 24 Hrs  T(C): 37.2 (15 Apr 2025 09:40), Max: 37.2 (15 Apr 2025 09:40)  T(F): 98.9 (15 Apr 2025 09:40), Max: 98.9 (15 Apr 2025 09:40)  HR: 74 (15 Apr 2025 10:26) (72 - 87)  BP: 100/69 (15 Apr 2025 10:26) (91/51 - 119/71)  BP(mean): --  RR: 18 (15 Apr 2025 10:26) (17 - 18)  SpO2: 95% (15 Apr 2025 10:26) (92% - 95%)    Parameters below as of 15 Apr 2025 10:26  Patient On (Oxygen Delivery Method): room air    Physical Exam:   MS - Pt initially asleep but woke up after sternal rub. Once awake, he is attends to examiner, oriented to self but not location or time. Follows simple command. When asked to name pen, he said "no idea"  CN - Pupils constricted b/l but equal (possibly from opioids), EOMI, V1-V3 symmetric, smile symmetric, no tongue deviation  Motor - b/l UE antigravity, b/l LE no movement noted (baseline)  Sensory - SILT b/l    LABORATORY:  CBC                       11.5   16.69 )-----------( 278      ( 15 Apr 2025 10:37 )             38.4     Chem 04-15    133[L]  |  97  |  22  ----------------------------<  153[H]  5.1   |  22  |  3.36[H]    Ca    7.6[L]      15 Apr 2025 10:37  Phos  4.6     04-15  Mg     2.0     04-15      LFTs   Coagulopathy   Lipid Panel   A1c   Cardiac enzymes     U/A Urinalysis Basic - ( 15 Apr 2025 10:37 )    Color: x / Appearance: x / SG: x / pH: x  Gluc: 153 mg/dL / Ketone: x  / Bili: x / Urobili: x   Blood: x / Protein: x / Nitrite: x   Leuk Esterase: x / RBC: x / WBC x   Sq Epi: x / Non Sq Epi: x / Bacteria: x      CSF  Immunological  Other    STUDIES & IMAGING: (EEG, CT, MR, U/S, TTE/LYLA):

## 2025-04-16 NOTE — CHART NOTE - NSCHARTNOTEFT_GEN_A_CORE
MEDICINE NP    BRIAN DELGADO  75y Male    Patient is a 75y old  Male who presents with a chief complaint of ESRD requiring HD, uremia (16 Apr 2025 17:35)         > Event Summary:   Patient DNR /DNI, noted in acute distress, daughter / HCP, Cori Delgado at bedside, requested patient to be comfort measures with symptom directed approach.  Emotional support provided.   [facility]OLST updated.  Dr. Martinez notified.  Palliative to follow in AM.       Kayleen Joseph, [facility]  Medicine Department  #[phone]

## 2025-04-16 NOTE — PROGRESS NOTE ADULT - SUBJECTIVE AND OBJECTIVE BOX
Date of Service: 04-16-25 @ 17:35    Patient is a 75y old  Male who presents with a chief complaint of ESRD requiring HD, uremia (16 Apr 2025 14:11)      Any change in ROS: for past two days pt has not been doing well : he has very coarse rhodochrous sounds in his lungs:  heis unable to cough out phlegm      MEDICATIONS  (STANDING):  albuterol/ipratropium for Nebulization 3 milliLiter(s) Nebulizer every 6 hours  aspirin  chewable 81 milliGRAM(s) Oral daily  atorvastatin 40 milliGRAM(s) Oral at bedtime  bacitracin/polymyxin B Ointment 1 Application(s) Topical two times a day  chlorhexidine 2% Cloths 1 Application(s) Topical daily  clopidogrel Tablet 75 milliGRAM(s) Oral daily  Dakins Solution - 1/4 Strength 1 Application(s) Topical every 8 hours  DAPTOmycin IVPB 900 milliGRAM(s) IV Intermittent every 48 hours  heparin   Injectable 5000 Unit(s) SubCutaneous every 8 hours  hydrOXYzine hydrochloride 25 milliGRAM(s) Oral three times a day  ibuprofen  Tablet. 400 milliGRAM(s) Oral every 12 hours  lactobacillus acidophilus 1 Tablet(s) Oral two times a day with meals  meropenem  IVPB 500 milliGRAM(s) IV Intermittent every 24 hours  methocarbamol 750 milliGRAM(s) Oral every 8 hours  midodrine. 10 milliGRAM(s) Oral <User Schedule>  mupirocin 2% Ointment 1 Application(s) Topical <User Schedule>  Nephro-margaret 1 Tablet(s) Oral daily  oxyCODONE  ER Tablet 20 milliGRAM(s) Oral every 8 hours  pantoprazole    Tablet 40 milliGRAM(s) Oral before breakfast  polyethylene glycol 3350 17 Gram(s) Oral daily  povidone iodine 10% Solution 1 Application(s) Topical two times a day  senna 2 Tablet(s) Oral at bedtime    MEDICATIONS  (PRN):  benzocaine/menthol Lozenge 1 Lozenge Oral three times a day PRN Sore Throat  bisacodyl 5 milliGRAM(s) Oral every 12 hours PRN Constipation  HYDROmorphone  Injectable 1 milliGRAM(s) IV Push every 6 hours PRN severe breakthrough pain  oxyCODONE    IR 10 milliGRAM(s) Oral every 4 hours PRN Severe Pain (7 - 10)  sodium chloride 0.9% lock flush 10 milliLiter(s) IV Push every 1 hour PRN Pre/post blood products, medications, blood draw, and to maintain line patency    Vital Signs Last 24 Hrs  T(C): 36.6 (16 Apr 2025 16:15), Max: 37.2 (16 Apr 2025 08:55)  T(F): 97.9 (16 Apr 2025 16:15), Max: 98.9 (16 Apr 2025 08:55)  HR: 99 (16 Apr 2025 16:15) (61 - 102)  BP: 93/65 (16 Apr 2025 16:15) (90/58 - 130/90)  BP(mean): --  RR: 18 (16 Apr 2025 16:15) (18 - 18)  SpO2: 92% (16 Apr 2025 16:15) (92% - 100%)    Parameters below as of 16 Apr 2025 16:15  Patient On (Oxygen Delivery Method): room air        I&O's Summary    15 Apr 2025 07:01  -  16 Apr 2025 07:00  --------------------------------------------------------  IN: 150 mL / OUT: 200 mL / NET: -50 mL    16 Apr 2025 07:01  -  16 Apr 2025 17:35  --------------------------------------------------------  IN: 760 mL / OUT: 340 mL / NET: 420 mL          Physical Exam:   GENERAL: NAD, well-groomed, well-developed  HEENT: VINNY/   Atraumatic, Normocephalic  ENMT: No tonsillar erythema, exudates, or enlargement; Moist mucous membranes, Good dentition, No lesions  NECK: Supple, No JVD, Normal thyroid  CHEST/LUNG: rhonchoiiurous breath sounds bilaterally extensive:   CVS: Regular rate and rhythm; No murmurs, rubs, or gallops  GI: : Soft, Nontender, Nondistended; Bowel sounds present  NERVOUS SYSTEM:  Alert & Oriented X3  EXTREMITIES: + edema,  leg wound and gangrenous digits : sacral decubiti   LYMPH: No lymphadenopathy noted  SKIN: No rashes or lesions  ENDOCRINOLOGY: No Thyromegaly  PSYCH: Appropriate    Labs:                              10.5   7.83  )-----------( 247      ( 16 Apr 2025 13:42 )             33.9                         11.5   16.69 )-----------( 278      ( 15 Apr 2025 10:37 )             38.4     04-16    135  |  99  |  30[H]  ----------------------------<  113[H]  4.9   |  22  |  3.99[H]  04-15    133[L]  |  97  |  22  ----------------------------<  153[H]  5.1   |  22  |  3.36[H]    Ca    7.6[L]      16 Apr 2025 13:42  Ca    7.6[L]      15 Apr 2025 10:37  Phos  4.7     04-16  Phos  4.6     04-15  Mg     2.0     04-15      CAPILLARY BLOOD GLUCOSE              Urinalysis Basic - ( 16 Apr 2025 13:42 )    Color: x / Appearance: x / SG: x / pH: x  Gluc: 113 mg/dL / Ketone: x  / Bili: x / Urobili: x   Blood: x / Protein: x / Nitrite: x   Leuk Esterase: x / RBC: x / WBC x   Sq Epi: x / Non Sq Epi: x / Bacteria: x    rad< from: CT Chest No Cont (04.15.25 @ 23:03) >   The soft tissues are unremarkable.    IMPRESSION:  Tracheobronchial secretions with multifocal impaction and likely   superimposed infectious/pulmonary process.    --- End of Report ---            CATHLEEN RIVAS MD; Attending Radiologist  This document has been electronically signed. Apr 16 2025  7:09AM    < end of copied text >          RECENT CULTURES:        RESPIRATORY CULTURES:          Studies  Chest X-RAY  CT SCAN Chest   Venous Dopplers: LE:   CT Abdomen  Others

## 2025-04-16 NOTE — PROGRESS NOTE ADULT - ASSESSMENT
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 The initial plan was that he would present to the [facility] ER Monday 2/17 for HD initiation. However, day of admission, he fell and sustained trauma to his knee. Called to consult for cough, elevated R hemidiaphragm.

## 2025-04-16 NOTE — PROGRESS NOTE ADULT - PROBLEM SELECTOR PLAN 1
4/12: resolved:  his last cxr is clear:  4/14:  developed some productive cough today : no sob:  no wheezing:   cont antibiotics per ID:  pulm wise on room air:   aspiration precautions:       4/13: no new resp symptoms:  has pain in wound  always on bed:  high risk for pneumonia dw pt and his wife at bedside    4/15today he looks pretty weak : bed bound  has sacral decubiti ; and gangrenous digits:  pulm wise he seems OK:  has some dry cough : but his last chest xray was clear:  no antibiotics needed from pulm point of view:    4/16: he is doing very poorly:   has lot of secretions which he is unable to expectorate   rpt ct c hest noted:  started on meropenem and daptomycin : id following:  not a very good prognosis

## 2025-04-16 NOTE — CHART NOTE - NSCHARTNOTEFT_GEN_A_CORE
NUTRITION FOLLOW UP NOTE    PATIENT SEEN FOR: Malnutrition PO follow up    SOURCE: [] Patient  [x] Current Medical Record  [x] RN  [] Family/support person at bedside  [x] Patient unavailable/inappropriate: sleeping during time of visit  [] Other:    CHART REVIEWED/EVENTS NOTED.  [] No changes to nutrition care plan to note  [x] Nutrition Status:  - chief complaint of ESRD requiring HD, uremia per chart   - s/p OR for sacral and thigh wound debridement   - PNA; Suspect aspiration related s/p episode of vomiting  - per chart,  s/p RRT for hypotension, had HD earlier, s/p IVF and midodrine,  overnight s/p RRT for AMS, responded to sternal rub and answered appropriated, annoyed at presence of RRT team  - Ongoing GOC discussion with family    DIET ORDER:   Diet, Regular:   1000mL Fluid Restriction ([id number])  Low Sodium  Liquid Protein Supplement     Qty per Day:  2  Supplement Feeding Modality:  Oral  Nepro Cans or Servings Per Day:  2       Frequency:  Daily (25)    CURRENT DIET ORDER IS:  [] Appropriate:  [] Inadequate:  [x] Other: see recommendations below.     NUTRITION INTAKE/PROVISION:  [x] PO: Pt visited. Per discussion with RN, patient eating poorly in-house. Pt refuses to eat even when family is here, "patient is barely eating anything." RN states patient is not drinking any Nepro at this time. No chewing/swallowing difficulties reported at this time. No other GI distress reported.   [] Enteral Nutrition:  [] Parenteral Nutrition:    ANTHROPOMETRICS:  Drug Dosing Weight  Height (cm): 172.7 (2025 15:25)  Weight (kg): 90.7 (2025 15:25)  BMI (kg/m2): 30.4 (2025 15:25)  BSA (m2): 2.04 (2025 15:25)    **Weights: Daily Weight in k.4 () No new weight to assess, weight fluctuations likely in setting of fluid shifts and/or scale discrepancies. RD will continue to monitor weight trends as available/able.     MEDICATIONS:  MEDICATIONS  (STANDING):  albuterol/ipratropium for Nebulization 3 milliLiter(s) Nebulizer every 6 hours  aspirin  chewable 81 milliGRAM(s) Oral daily  atorvastatin 40 milliGRAM(s) Oral at bedtime  bacitracin/polymyxin B Ointment 1 Application(s) Topical two times a day  chlorhexidine 2% Cloths 1 Application(s) Topical daily  clopidogrel Tablet 75 milliGRAM(s) Oral daily  Dakins Solution - 1/4 Strength 1 Application(s) Topical every 8 hours  DAPTOmycin IVPB 900 milliGRAM(s) IV Intermittent every 48 hours  heparin   Injectable 5000 Unit(s) SubCutaneous every 8 hours  hydrOXYzine hydrochloride 25 milliGRAM(s) Oral three times a day  ibuprofen  Tablet. 400 milliGRAM(s) Oral every 12 hours  lactobacillus acidophilus 1 Tablet(s) Oral two times a day with meals  meropenem  IVPB 500 milliGRAM(s) IV Intermittent every 24 hours  methocarbamol 750 milliGRAM(s) Oral every 8 hours  midodrine. 10 milliGRAM(s) Oral <User Schedule>  mupirocin 2% Ointment 1 Application(s) Topical <User Schedule>  Nephro-margaret 1 Tablet(s) Oral daily  oxyCODONE  ER Tablet 20 milliGRAM(s) Oral every 8 hours  pantoprazole    Tablet 40 milliGRAM(s) Oral before breakfast  polyethylene glycol 3350 17 Gram(s) Oral daily  povidone iodine 10% Solution 1 Application(s) Topical two times a day  senna 2 Tablet(s) Oral at bedtime    MEDICATIONS  (PRN):  benzocaine/menthol Lozenge 1 Lozenge Oral three times a day PRN Sore Throat  bisacodyl 5 milliGRAM(s) Oral every 12 hours PRN Constipation  HYDROmorphone  Injectable 1 milliGRAM(s) IV Push every 6 hours PRN severe breakthrough pain  oxyCODONE    IR 10 milliGRAM(s) Oral every 4 hours PRN Severe Pain (7 - 10)  sodium chloride 0.9% lock flush 10 milliLiter(s) IV Push every 1 hour PRN Pre/post blood products, medications, blood draw, and to maintain line patency    NUTRITIONALLY PERTINENT LABS:  04-15 Na133 mmol/L[L] Glu 153 mg/dL[H] K+ 5.1 mmol/L Cr  3.36 mg/dL[H] BUN 22 mg/dL 04-15 Phos 4.6 mg/dL[H]    A1C with Estimated Average Glucose Result: 5.7 % (24 @ 06:44)    NUTRITIONALLY PERTINENT MEDICATIONS/LABS:  [x] Reviewed  [x] Relevant notes on medications/labs:  - Ordered for Nephro-margaret  - GI: ordered for lactobacillus acidophilus, dulcolax, miralax, senna   - Hyponatremia noted    EDEMA:  [x] Reviewed  [x] Relevant notes: +1 bilateral ankles, +2 generalized, and left arm edema per RN flowsheet.    GI/ I&O:  [x] Reviewed  [x] Relevant notes: Last bowel movement x 1 () per RN flowsheet.   [] Other:    SKIN:   [] No pressure injuries documented, per nursing flowsheet  [x] Pressure injury previously noted  [x] Change in pressure injury documentation:  [x] Other: noted multiple vascular/arterial and non pressure wounds per flowsheets (see flowsheets for details/locations)   - per wound care note 3/31: "Wound Consult requested to assist w/ management of:  Sacral, Rt Ischial, and Left Buttock wounds  BLE severe PAD w/ extensive wounds"    ESTIMATED NEEDS:  [x] No change:  [] Updated:  Energy: 9590-6187 kcal/day (30-35 kcal/kg)  Protein: 83.8-97.7 g/day (1.2-1.4 g/kg)  Fluid:   ml/day or [x] defer to team  Based on: IBW 69.8 kg    NUTRITION DIAGNOSIS:  [x] Prior Dx: 1) moderate chronic malnutrition and 2) increased nutrient needs  [] New Dx:      EDUCATION:  [] Yes:  [x] Not appropriate/warranted    NUTRITION CARE PLAN:  1. Diet: Recommend to continue current Regular and Low Sodium diet and 1 L fluid restrictions as ordered  2. Supplements: Continue Nepro oral supplement BID to provide 420 kcal, 19 grams protein per 8 oz. Continue LPS BID to provide 100 kcal, 15 grams protein per serving,    - RD will continue to provide protein fortified smoothie as available to promote PO intake   3. Multivitamin/mineral supplementation: Continue Nephrovite to promote wound healing, pending no medical contraindications   4. Low threshold for advance nutrition intervention (appetite stimulant vs nutrition support) if patient's intake remains sub-optimal and is within GOC   5. Continue to monitor PO intake, weight trend, electrolytes, blood glucose, labs, BMs in-house   6. Nutrition recommendations/interventions that align with GOC.     [] Achieved - Continue current nutrition intervention(s)  [] Current medical condition precludes nutrition intervention at this time.    MONITORING AND EVALUATION:   RD remains available upon request and will follow up per protocol.    Name  Angi Sterling RDN, CDN / TEAMS Available on [place] TEAMS

## 2025-04-16 NOTE — PROGRESS NOTE ADULT - SUBJECTIVE AND OBJECTIVE BOX
Patient is a 75y old  Male who presents with a chief complaint of ESRD requiring HD, uremia (16 Apr 2025 07:39)      SUBJECTIVE / OVERNIGHT EVENTS: ptn appears better today, myoclonic jerks resolved off Gabapentin. Dr. Brown from nephrology recommended to stop HD as ptn is not improving and getting worse clinically. family to decide. if HD will be stopped, [facility]. this was d.w Palliatiive care attending as well. the daughter, Cori, who is HCP asked not to inform the ptn nor his wife of these developments. head ct from last night:  no acute findings. CT chest: b/l mult mucoid impactions, cannot r/o multifocal PNA. cont IV Dapto and Meropenem.     MEDICATIONS  (STANDING):  albuterol/ipratropium for Nebulization 3 milliLiter(s) Nebulizer every 6 hours  aspirin  chewable 81 milliGRAM(s) Oral daily  atorvastatin 40 milliGRAM(s) Oral at bedtime  bacitracin/polymyxin B Ointment 1 Application(s) Topical two times a day  chlorhexidine 2% Cloths 1 Application(s) Topical daily  clopidogrel Tablet 75 milliGRAM(s) Oral daily  Dakins Solution - 1/4 Strength 1 Application(s) Topical every 8 hours  DAPTOmycin IVPB 900 milliGRAM(s) IV Intermittent every 48 hours  heparin   Injectable 5000 Unit(s) SubCutaneous every 8 hours  hydrOXYzine hydrochloride 25 milliGRAM(s) Oral three times a day  ibuprofen  Tablet. 400 milliGRAM(s) Oral every 12 hours  lactobacillus acidophilus 1 Tablet(s) Oral two times a day with meals  meropenem  IVPB 500 milliGRAM(s) IV Intermittent every 24 hours  methocarbamol 750 milliGRAM(s) Oral every 8 hours  midodrine. 10 milliGRAM(s) Oral <User Schedule>  mupirocin 2% Ointment 1 Application(s) Topical <User Schedule>  Nephro-margaret 1 Tablet(s) Oral daily  oxyCODONE  ER Tablet 20 milliGRAM(s) Oral every 8 hours  pantoprazole    Tablet 40 milliGRAM(s) Oral before breakfast  polyethylene glycol 3350 17 Gram(s) Oral daily  povidone iodine 10% Solution 1 Application(s) Topical two times a day  senna 2 Tablet(s) Oral at bedtime    MEDICATIONS  (PRN):  benzocaine/menthol Lozenge 1 Lozenge Oral three times a day PRN Sore Throat  bisacodyl 5 milliGRAM(s) Oral every 12 hours PRN Constipation  HYDROmorphone  Injectable 1 milliGRAM(s) IV Push every 6 hours PRN severe breakthrough pain  oxyCODONE    IR 10 milliGRAM(s) Oral every 4 hours PRN Severe Pain (7 - 10)  sodium chloride 0.9% lock flush 10 milliLiter(s) IV Push every 1 hour PRN Pre/post blood products, medications, blood draw, and to maintain line patency      Vital Signs Last 24 Hrs  T(F): 98.9 (04-16-25 @ 08:55), Max: 98.9 (04-16-25 @ 08:55)  HR: 67 (04-16-25 @ 08:55) (67 - 97)  BP: 119/65 (04-16-25 @ 08:55) (100/63 - 130/90)  RR: 18 (04-16-25 @ 08:55) (18 - 18)  SpO2: 95% (04-16-25 @ 08:55) (92% - 98%)  Telemetry:   CAPILLARY BLOOD GLUCOSE        I&O's Summary    15 Apr 2025 07:01  -  16 Apr 2025 07:00  --------------------------------------------------------  IN: 150 mL / OUT: 200 mL / NET: -50 mL    16 Apr 2025 07:01  -  16 Apr 2025 14:12  --------------------------------------------------------  IN: 160 mL / OUT: 250 mL / NET: -90 mL        PHYSICAL EXAM:  GENERAL: NAD, well-developed  HEAD:  Atraumatic, Normocephalic  EYES: EOMI, PERRLA, conjunctiva and sclera clear  NECK: Supple, No JVD  CHEST/LUNG: Clear to auscultation bilaterally; No wheeze  HEART: Regular rate and rhythm; No murmurs, rubs, or gallops  ABDOMEN: Soft, Nontender, Nondistended; Bowel sounds present  EXTREMITIES:  2+ Peripheral Pulses, No clubbing, cyanosis, or edema  PSYCH: AAOx3  NEUROLOGY: non-focal  SKIN: No rashes or lesions    LABS:                        10.5   7.83  )-----------( 247      ( 16 Apr 2025 13:42 )             33.9     04-15    133[L]  |  97  |  22  ----------------------------<  153[H]  5.1   |  22  |  3.36[H]    Ca    7.6[L]      15 Apr 2025 10:37  Phos  4.6     04-15  Mg     2.0     04-15            Urinalysis Basic - ( 15 Apr 2025 10:37 )    Color: x / Appearance: x / SG: x / pH: x  Gluc: 153 mg/dL / Ketone: x  / Bili: x / Urobili: x   Blood: x / Protein: x / Nitrite: x   Leuk Esterase: x / RBC: x / WBC x   Sq Epi: x / Non Sq Epi: x / Bacteria: x        RADIOLOGY & ADDITIONAL TESTS:    Imaging Personally Reviewed:    Consultant(s) Notes Reviewed:      Care Discussed with Consultants/Other Providers:

## 2025-04-16 NOTE — ADVANCED PRACTICE NURSE CONSULT - ASSESSMENT
Midline Catheter Insertion Note    Indication: long term antibiotics  Catheter type: 4F  : Bard  Power injectable: Yes  LOT# [id number]                                                                                                                                                                                                                  Procedure assisted by: EDGARDO Riley RN  Time out was performed, confirming the patient's first and last name, date of birth, procedure, and correct site prior to state of procedure.    Patient was placed with HOB 30 degrees. A mask provided for patient. Patient placement site was prepped with chlorhexidine solution, then draped using maximum sterile barrier protection. The area was injected with 2 ml of 1% lidocaine. Using the Bard Site Rite 8, the catheter was placed using the Modified Seldinger Technique. Strict adherence to outline aseptic technique including handwashing, glove and gown, utilizing mask and cap, plus draping the patient with a sterile drape was observed. Upon completion of line placement, the insertion site was covered with a sterile occlusive CHG dressing. Pt tolerated procedure well.     Site: New  Anatomical Site of insertion: Left Basilic vein  Catheter Size: 4F, 20cm  US guided Bard single lumen power midline placed.    All materials used for catheter insertion, including the intact guide wires, were accounted for at the end of the procedure.  Number of attempts: 1  Complications/Comments: None  Emergency Placement: No    Post procedure verbal instructions given to the patient or patient representative. Patient or patient representative  instructed regarding signs and symptoms of infection. Patient instructed to keep dressing dry and clean. Care for catheter as per hospital protocols

## 2025-04-16 NOTE — PROGRESS NOTE ADULT - SUBJECTIVE AND OBJECTIVE BOX
Overnight events noted      VITAL:  T(C): , Max: 37.2 (04-15-25 @ 09:40)  T(F): , Max: 98.9 (04-15-25 @ 09:40)  HR: 78 (04-16-25 @ 00:35)  BP: 108/60 (04-16-25 @ 04:30)  RR: 18 (04-16-25 @ 04:30)  SpO2: 98% (04-16-25 @ 04:30)      PHYSICAL EXAM:  Constitutional: alert, NAD  HEENT: NCAT, DMM  Neck: Supple, No JVD  Respiratory: diminished   Cardiovascular: RRR s1s2, no m/r/g  Gastrointestinal: BS+, soft, NT/ND  : (+)suprapubic cath  Extremities: 1+ b/l LE edema, wrapped in dsg  Neurological: reduced generalized strength  Back: no CVAT b/l  Skin: gangrenous changes b/l feet  Access: RIJ tunneled cath    LABS:                        11.5   16.69 )-----------( 278      ( 15 Apr 2025 10:37 )             38.4     Na(133)/K(5.1)/Cl(97)/HCO3(22)/BUN(22)/Cr(3.36)Glu(153)/Ca(7.6)/Mg(2.0)/PO4(4.6)    04-15 @ 10:37      IMPRESSION: 74M w/ polio, neurogenic bladder/suprapubic catheter, 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 Due for next HD today    (2)Hyponatremia - controlled with high-Na+ bath with HD    (3)Anemia - improved to point where we can d/c Retacrit    (4)PAD - s/p LE bypass 3/3/25     (5)CV - tenuous hemodynamics - limiting UF with HD, and receiving Midodrine pre-HD     (6)ID - infected sacral ulcer - s/p debridement late last week; now on IV Cefepime + Dapto      RECOMMEND:   (1)D/C Retacrit - restart once Hb <10  (2)Next HD today - 0.5kg UF as able; Midodrine pre-HD  (3)Continue abx for GFR<10/HD                Rafita Denny MD  [facility]  Office/on call physician: [phone]  Cell (7a-7p): [phone]       Admits to persistent/worsening sacral pain  Admits to generalized weakness/fatigue/malaise      VITAL:  T(C): , Max: 37.2 (04-15-25 @ 09:40)  T(F): , Max: 98.9 (04-15-25 @ 09:40)  HR: 78 (04-16-25 @ 00:35)  BP: 108/60 (04-16-25 @ 04:30)  RR: 18 (04-16-25 @ 04:30)  SpO2: 98% (04-16-25 @ 04:30)      PHYSICAL EXAM:  Constitutional: lethargic/muted voice  HEENT: NCAT, DMM  Neck: Supple, No JVD  Respiratory: diminished   Cardiovascular: RRR s1s2, no m/r/g  Gastrointestinal: BS+, soft, NT/ND  : (+)suprapubic cath  Extremities: 1+ b/l LE edema, wrapped in dsg  Neurological: reduced generalized strength  Back: no CVAT b/l  Skin: gangrenous changes b/l feet  Access: RIJ tunneled cath    LABS:                        11.5   16.69 )-----------( 278      ( 15 Apr 2025 10:37 )             38.4     Na(133)/K(5.1)/Cl(97)/HCO3(22)/BUN(22)/Cr(3.36)Glu(153)/Ca(7.6)/Mg(2.0)/PO4(4.6)    04-15 @ 10:37      IMPRESSION: 74M w/ polio, neurogenic bladder/suprapubic catheter, 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 Due for next HD today    (2)Hyponatremia - controlled with high-Na+ bath with HD    (3)Anemia - improved to point where we can d/c Retacrit    (4)PAD - s/p LE bypass 3/3/25     (5)CV - tenuous hemodynamics - limiting UF with HD, and receiving Midodrine pre-HD     (6)ID - infected sacral ulcer - s/p debridement late last week; now on IV Cefepime + Dapto    (7)GOC - clinical status has deteriorated over the past several days. He appears worse off than I recall having seen him. I believe at this point, given his worsening conditioning and given his pain/suffering, that the best option would be for him to discontinue further HD.      RECOMMEND:   (1)D/C Retacrit - restart once Hb <10  (2)Next HD today - 0.5kg UF as able; Midodrine pre-HD  (3)Continue abx for GFR<10/HD    called patient's daughter Cori and spoke to her extensively regarding the patient's worsening clinical status. Advised that I believe that it is time for them to focus on comfort measures. Also discussed by phone with daughter Norma. Provided emotional support as able/answered all questions as able. At least for today, they asked if we can attempt HD. Of note, HD was performed x 40minutes this afternoon, but had to be terminated afterwards due to intradialytic hypotension.            Rafita Denny MD  [facility]  Office/on call physician: [phone]  Cell (7a-7p): [phone]

## 2025-04-16 NOTE — CHART NOTE - NSCHARTNOTEFT_GEN_A_CORE
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    -IR consulted for midline clearance. Per discussion with nephrology (Rafita Denny), no contraindication for midline placement at this time. Ok to proceed with procedure team placement of midline per IR standpoint.  -Discussed with primary team    Scotty Stauffer MD  PGY-III, Diagnostic Radiology Resident    -Available on [employer] TEAMS  -Emergent issues: [facility]-p.[phone]; [facility]-p.[phone] ([phone])  -Non-emergent consults: Please place a [place] order "IR Consult" with an appropriate callback number  -Scheduling questions: [facility]: [phone]; [facility]: [phone]  -Clinic/Outpatient booking: [facility]: [phone]; [facility]: [phone]

## 2025-04-16 NOTE — PROGRESS NOTE ADULT - SUBJECTIVE AND OBJECTIVE BOX
Neurology Progress Note    SUBJECTIVE/OBJECTIVE/INTERVAL EVENTS:   better today but still with myoclonic jerks       Medications: MEDICATIONS  (STANDING):  albuterol/ipratropium for Nebulization 3 milliLiter(s) Nebulizer every 6 hours  aspirin  chewable 81 milliGRAM(s) Oral daily  atorvastatin 40 milliGRAM(s) Oral at bedtime  bacitracin/polymyxin B Ointment 1 Application(s) Topical two times a day  chlorhexidine 2% Cloths 1 Application(s) Topical daily  clopidogrel Tablet 75 milliGRAM(s) Oral daily  Dakins Solution - 1/4 Strength 1 Application(s) Topical every 8 hours  DAPTOmycin IVPB 900 milliGRAM(s) IV Intermittent every 48 hours  heparin   Injectable 5000 Unit(s) SubCutaneous every 8 hours  hydrOXYzine hydrochloride 25 milliGRAM(s) Oral three times a day  ibuprofen  Tablet. 400 milliGRAM(s) Oral every 12 hours  lactobacillus acidophilus 1 Tablet(s) Oral two times a day with meals  meropenem  IVPB 500 milliGRAM(s) IV Intermittent every 24 hours  methocarbamol 750 milliGRAM(s) Oral every 8 hours  midodrine. 10 milliGRAM(s) Oral <User Schedule>  mupirocin 2% Ointment 1 Application(s) Topical <User Schedule>  Nephro-margaret 1 Tablet(s) Oral daily  oxyCODONE  ER Tablet 20 milliGRAM(s) Oral every 8 hours  pantoprazole    Tablet 40 milliGRAM(s) Oral before breakfast  polyethylene glycol 3350 17 Gram(s) Oral daily  povidone iodine 10% Solution 1 Application(s) Topical two times a day  senna 2 Tablet(s) Oral at bedtime    MEDICATIONS  (PRN):  benzocaine/menthol Lozenge 1 Lozenge Oral three times a day PRN Sore Throat  bisacodyl 5 milliGRAM(s) Oral every 12 hours PRN Constipation  HYDROmorphone  Injectable 1 milliGRAM(s) IV Push every 6 hours PRN severe breakthrough pain  oxyCODONE    IR 10 milliGRAM(s) Oral every 4 hours PRN Severe Pain (7 - 10)  sodium chloride 0.9% lock flush 10 milliLiter(s) IV Push every 1 hour PRN Pre/post blood products, medications, blood draw, and to maintain line patency       Vitals:  Vital Signs Last 24 Hrs  T(C): 37 (16 Apr 2025 20:01), Max: 37.2 (16 Apr 2025 08:55)  T(F): 98.6 (16 Apr 2025 20:01), Max: 98.9 (16 Apr 2025 08:55)  HR: 110 (16 Apr 2025 20:01) (61 - 110)  BP: 128/74 (16 Apr 2025 20:01) (90/58 - 130/89)  BP(mean): --  RR: 18 (16 Apr 2025 20:01) (18 - 18)  SpO2: 91% (16 Apr 2025 20:01) (91% - 100%)    Parameters below as of 16 Apr 2025 20:01  Patient On (Oxygen Delivery Method): nasal cannula  O2 Flow (L/min): 3        Patient On (Oxygen Delivery Method): room air    Physical Exam:   MS - Pt initially asleep but woke up after sternal rub. Once awake, he is attends to examiner, oriented to self but not location or time. Follows simple command. When asked to name pen, he said "no idea"  CN - Pupils constricted b/l but equal (possibly from opioids), EOMI, V1-V3 symmetric, smile symmetric, no tongue deviation  Motor - b/l UE antigravity, b/l LE no movement noted (baseline)  Sensory - SILT b/l  \  l  LABS:                          10.5   7.83  )-----------( 247      ( 16 Apr 2025 13:42 )             33.9     04-16    135  |  99  |  30[H]  ----------------------------<  113[H]  4.9   |  22  |  3.99[H]    Ca    7.6[L]      16 Apr 2025 13:42  Phos  4.7     04-16  Mg     2.0     04-15          Urinalysis Basic - ( 16 Apr 2025 13:42 )    Color: x / Appearance: x / SG: x / pH: x  Gluc: 113 mg/dL / Ketone: x  / Bili: x / Urobili: x   Blood: x / Protein: x / Nitrite: x   Leuk Esterase: x / RBC: x / WBC x   Sq Epi: x / Non Sq Epi: x / Bacteria: x          CSF  Immunological  Other    STUDIES & IMAGING: (EEG, CT, MR, U/S, TTE/LYLA):

## 2025-04-16 NOTE — PROGRESS NOTE ADULT - ASSESSMENT
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  came in after fall and for HD.     2/20 CTH neg   123 --> now 133   A1c 5.7   TSH WNL 3.46   o/e 2/21 AAOx2, uppers 4/5, lowers limited .  2/23; family bedside upset that he has pain in leg after he was moved.  patient going for imaging now.   s/p R IJ permacath by IR 2/25 2/27 AAOx3   sopoke with family bedisde 3/17 wife says mental status okay, sometimes up and down but good   3/18 was told he has "new neuropathy" spoke iwshahida patient and wife at bedside. states its the same from before not a new issue.    3/21 mental status stable. c/o pubic cathter   3/23 c/o nausea today   no new changes   4/3 neuro intact. LE same.  c/o Nausea   s/p sacral wound debridment   4/15 recontacted regarding abnormal brief jerks without impairment of mental status    Impression  1) brief jerks, nonrhythmic, nonsustained, that can happen all over body, no impairment of mental status during jerks (although does have fluctuations in mental status, possibly from sedating medications). Clinically suspicious for myoclonus, could be i/s/o of multiple toxic metabolic infectious processes including renal failure requiring HD. However, will r/o seizure activity.  2) AMS, waxing and waing , likely multifactorial from infection (WBC 16.69), metabolic/electrolyte derrangements/ delerium / medication effect, uremia which is imporving   3) polio, chronic weakness  4) fall 2/2 weakness  5) hyponatremia to 123 2/20  improved 133 -->136;   back down to 129 -- 133      - f/u palliaitve and possible hospice   - neprho rec to dc HD given fraility and no improvement   - recommend routine EEG to evaluate for seizure activity  - no further medication recommendations with respect to suspected myoclonus at this time (pt already over-sedated from existing pain medications), will get EEG to help clarify etiology and update regarding medication changes (or the lack thereof). check ammonia level   -  meropenum  still ; also dapto through 4/19  - wound care    - AVF outpatient l; f/u vascular     f/u vascular; no vascular options ; f/u with Messi from vasculare to schedule outpatient procedure   - on DAPT   - was getting oxycodone ER 30mg BID and oxy PRN IR i10 and dilauded PRN ;   consider gabapentin 200mg TID or lyrica 50mg BID fo nueorpathy if no objection ; patient states he still has pain but its better.  vascular has seen patient.  poor prognosis of LLE   - f/u psych   - limit sedating meds   - continue to monitor and correct metabolic derrangements .  - delerium precuations.   - frequent re orientation  - check b12, RPR, ammonia level if never checked    - PT/OT   - check FS, glucose control <180  - GI/DVT ppx   Paul Limon MD  Vascular Neurology  Office: [phone]

## 2025-04-16 NOTE — PROGRESS NOTE ADULT - PROBLEM SELECTOR PLAN 5
-S/p b/l iliac stent placement on 3/3/25  -Vascular, podiatry f/u    4/12: on antibiotics:  cont current care  and antibiotics:  defer to vascular surgery  4/13: cont antibiotics  4/14: antibiotics per ID  4/15: cont antibtiocs:" has lot of wounds  4/16: cont antibiotics:

## 2025-04-16 NOTE — CHART NOTE - NSCHARTNOTEFT_GEN_A_CORE
Interval Hx : CT head showed Mild chronic microvascular changes without evidence of an acute transcortical infarction or hemorrhage.  > Spoke with neuro  and updated the result.  > Rec EEG.  > Will notify the morning team.    Jatinder Lima NP  Medicine

## 2025-04-16 NOTE — PROGRESS NOTE ADULT - ASSESSMENT
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 He is well known to me from multiple recent admisions  s/p admissions at [facility] 12/20-12/25/24 and 2/4-2/7 with SONDRA on CKD with associated uremic symptoms.   At the last admission he had expressed strong interest to try to hold off with HD intiation but he has been getting worse clinically at home.  His SONDRA and uremic symptoms significantly improved after the first admission; they improved a to mild extent during the 2nd admission to the point where he was able to be discharged without HD. Since then, however, he has worsened further.   He has been suffering from progressively worsening diffuse pruritus, loss of appetite, and generalized weakness and falls.   His nephrologist and PCP has been speaking with him and his family over the past few days,   The initial plan was that he would present to the [facility] ER Monday 2/17 (ie tomorrow) for HD initiation. Today, however, he fell and sustained trauma to his knee. Given the fall and concern for knee fracture, he presented to the ER today. multiple xrays show no Fractures.      CKD5, uremia, requiring initiation of HD  Rash, seborrheic dermatitis  Pruritis  Anemia  PAD  SP catheter, chronic  Left inferior pole acute on chronic patella Fx    plan  - HD via R subclavian permacath  -4/16: ptn appears better today, myoclonic jerks resolved off Gabapentin. Dr. Brown from nephrology recommended to stop HD as ptn is not improving and getting worse clinically. family to decide. if HD will be stopped, [facility]. this was d.w Palliatiive care attending as well. the daughter, Yanique, who is HCP asked not to inform the ptn nor his wife of these developments. head ct from last night:  no acute findings. CT chest: b/l mult mucoid impactions, cannot r/o multifocal PNA. cont IV Dapto and Meropenem.   -4/15: ptn is altered, has myoclonic jerks, he states he is in severe pain, ptn wofe, daughter palliaitive care and wound care had a 45 min discussion about prognosis and GOC. the daughter asked wound care if the ptn can be placed in an induced coma in order to do proper wound care and pain control. wound care team explained that is not a viable option. ptn is eating, but is not optimized nutritionally for better wound healing. he is also a high risk of complications from TPN. cont HD as per renal, ptn is on Midodrine for hypotension. wbc phoebe today, will do blood cx x 2. ABx as per ID. Neuro following. will get Head and chest CT  -4/14: ptn is awaiting to go to HD, extensive conversation w daughter yanique about GOC, ptn has new sacral wounds despite getting turned, the present sacaral wound are healing, getting daily dressing changes. daughter wants to talk to palliaitive and wound care re prognosis and GOC meeting. wound care attending made aware to call the daughter, spoke w JARAD Fontenot to place Palliative re-eval re GOC. spoje to yanique x 45 min re hospital course and she expressed a possibility of stopping HD and proceed w hospice.   -4/13: ptn is in good spirits, he was transferred 2/2 requiring an isolation room 2/2 growing VRE, CR E.coli, in addition to P.aeruginosa in sacral wound. wound dressing changed last night and this am, pain is controlled. wife at bedside. i reiterated to the ptn and the wife that refusing dressing changes will put him back and the wound will get worse. the ptn understands and stated he will not refuse dressing changes in the future.   -4/12: ongoing severe pain at sacral decubiti, wound care done by nursing as per wound care and surgery recs. ABx changed to Daptomycin and Cefepime. Meropenem was DCed  - 4/11:  ptn is awake, alert, has pain in LE, sacral pain is controlled. after a conversation about pain management and his history re debility starting in youth, ptn asked me to pay in the bed w him. i felt uncomfortable and excused myself. wife at the bedside. ptn is AxOx4. i told him the comment was not appropriate and the ptn laughed it off and asked why. as far as wound care, ptn has been refusing to have dressing changed post op. he has fecal incontinence.   -4/10:  pain is controlled, on Hemal for P. aeruginosa growing in sacral decub debridement Cx.ptn is DNR/DNI, was seen by palliaitive care. being followed by pain for pain management in LE and 2/2 sacral decubiti.  awaiting dressing change by surgical team.   -4/9: s/p debridement in OR by general surgery. pain consult and palliative consult reviewed. pain regimen reviewed w pain team.  they d/w ptn and daughter. ptn refused for the dressing to be changed by surgical team today. they tried several times. wound care post op is very important in healing process. meropenem restarted  -4/8: ptn is awaiting debridement in the OR today, debulking and wound care at the bedside has been challenging 2/2 pain not allowing 2/2 pain and fear of pain  - 4/7: ptn complaining his pain meds are not covering his pain completely. yesterday he was talking about death a lot and how he wished to die. had psych eval today. today he is doing better emotionally, not talking about drath and wants to cont treatment. he is tolerating HD, he finished ABx for PNA last week. his wounds are the primary reason of admission at this point.  I also called palliaitive consult.   He was seen by surgery for debridement of sacral decub, ptn is refusing diverting colostomy. on admission ptn had intact skin but did show primary stages of pressure injury in the sacral region. ptn didnt allow us to turn him 2/2 pain in LLE for several days, despite our concern of further pressure injury on the sacrum. once he was turned we discovered skin break and started wound care. again he would not allow us to turn him. he said the wound is ok, its scabbed, Dee Dee( his wife) is handling it. I called wound care to see him, wound care discovered that the area had necrosis, not scabs, debulking at the bedside was performed, ptn complained it was extremely painful. ptn was seen by wound care attending and recommended debridement in the OR under anesthesia. it is planned for 4/9. it was also recommended he should have a diverting colostomy. ptn is refusing it. HCP his daughter Yanique aware.   -4/6: spoke w ptn's daughter Yanique on the phone today x 20 min, spoke to ptn , his wife and daughter yanique at the bedside x 60 min today. ptn is agreeing to debridement in the OR, adamantly refusing diverting colostomy he would consider it if post debridement he would not improve and would require another debridement. ptn was seen by urology and ID bc daughter complained increased amount of "pus" at the SPT, both ID and urology feel there is no acute infection/cellulitis, the drainage at the site and expected and not unusual. daughter and wife state ptn didnt have sacral wound when he came. on admission with the aide of his wife we flipped him and he had 1st degree pressure decubiti sacrally, skin was intact.   - 4/5: thorough eval by wound care attending 4/4/25, case d/w wound care attending Dr Dugan. as per her recommendation placed general surgery consult for OR debridement and diverting colostomy. ptn had sacral decubiti prior to admission  -4/4: dr dugan from wound care had an extensive conversation w the ptn and daughter yanique on the phone at bedside. ptn needs general surgery consult for OR debridement of necrotic tissue of the sacral decubiti and diverting colostomy. ptn did not tolerate debridement at the bedside. . completed Abx for PNA  -4/3: ptn's insurance changed to straight medicare/medicaid. now able to go to [facility], but ptn wants to go home. will need equipment set up. this was d/w CM and ACP, daughter Yanique on the phone and wife at bedside today. today is last day of ABx. will transition pain meds to po DIlaudid from IV, will also need outptn pain management F/U, outptn wound care, home PT, need outptn HD set up  -4/2: seen prior to going to HD, no new events. tomorrow completing 10 day course of Meropenem. ongoing need for narcotics 2/2 pain.   -4/1: ptn is frustrated about a prolonged hospitalization but states he feels better overall and once medically cleared he will be ready to go home, not sooner than 4/7. completing ABx on 4/3.   -3/31: at HD today had 1.7 L UF, post HD was hypotense, gave gentle IVF , BP still a bit low. afebrile, loose BMs but not diarrhea, GI PCR not sent, RVP neg. . on Meropenem course for total of 10 days, last day 4/3, would care for decubiti and left knee wound post trauma  -3/30: Tmax 100.9, c/o dry cough and diarrhea but not watery. pain is controlled at the time of visit. wife at bedside. will check RVP panel, GI PCR, sine diarrhea non watery will not order C. Diff. ID to follow  - 3/29: cont meropenem, seen by coverage  - 3/28: pain from decubitus ulcer and Left knee post debridement continues. wound care recs in place. on Meropenem for PNA, pulm and ID following. HD as per renal. HDS  -3/27: ptn is complaining that pain post decubital and left knee debridement has been severe. his daughter is on speaker phone, wife is at bedside. his pain is controlled when meds are administered. he is very uncomfortable due to the pain at the site of decubitus debridement. lyrica helped his pain before but made him drowsy, griffin with gabapentin. he doesnt want them to be resumed. will cont dilaudid, oxycodone, oxycontin, ibuprofen. he is on Meropenem for aspiration PNA/HCAP. [facility] for DVT ppx. seen by vascular cardiology to address R IJ post shiley non occlusive DVT. full AC was not recommended. will check rpt doppler in 3-4 weeks.   - 3/26: Wound care performed bedside debridement of Left knee wound 2/2 lifted eschar, and sacral decubitus. findings c/w possible infection and mainly fat necrosis. cont Meropenem. ID following. podiatry following for gangrenous toes. will give additional single dose IV DIlaudid 2/2 severe pain post debridement.    - 3/25: wife at bedside, daughter on speaker phone at bedside. ptn is pain free at the time of visit and states he wants to cont the pain regiment he is presently on. ct C/A/P revealed: RLL PNA, prob aspiration, stercoral proctitis, decubitus ulcer w possible progression to sacral OM, R IJ nonocclusive DVT. these findings d/w ptn ,his family, wound care, ACP, Vascular, Nephrology, ID, pUlm, GI. meropenem initiated, Bactrim stopped. ptn states when he is cleared for DC, he wants to go home , not to [facility], not to SNF. daughter and wife aware.   -3/24: ptn  was seen by GI for N/V, its not clear the etiology, will obtain Ct C/A/P. ptn states he is coughing up green mucus as well  -3/23:  ptn is in good spirits, says he vomited "spit" this am, but tolerated all meals without vomiting. no abd pain, no undigested food in the vomit. afebrile, no diarrhea, RVP neg. GI consulted.   -3/21-22: ptn feels well.  pain is controlled. still no accepting facility for [facility]  -3/20: ptn states he has severe low back and LLE pain though overall is improving.   -3/19: ptn w tan crusting around SPC , states its painful. started on po Bactrim, renally dosed by ID for cellulitis.   -3/18: LLE paraesthesias are chronic, will d/w CM re dc planning to [facility]    -3/17: ptn states he is lethargic, he has LLE numbness which is new and severe pain at SPC insertion site. this was ID, d/w neuro, renal and vascular. as per ID: no sign of an acute infection recommend CT A/P.   -3/16:  urine cx from 3/15 NTD, zosyn DCed, awaiting dc to [facility], not sure will be able to go to [facility] since there is an insurance coverage conflict. HD as per renal  - 3/15: spoke w ptn's daughter today re denial from [facility] for [facility]. ptn has [employer] medicare advantage plan, which [facility] doesnt accept. i recommended to speak  to  and to the insurance company to find participating facilities. ptn is stable today. pain and erythema at SP catheter site has resolved today. seen by ID, will cont ZOSYN for now. UCx sent.   - 3/14: suprapubic tube changhed by , ptn has crusting at the insertion site and pain. will start Abx, urine always has sediment, will sent for cx, start Vanco/ZOsyn. ID consult called. check MRSA/MSSA PCR  -3/13: ptn is doing well. ptn has an accepting rehab facility, now pending AUTH.   -3/12: ptn is no longer constipated. doing well off prn IV DIlaudid. awaiting dc to [facility]  -3/11: ptn has no new c/o other than feeling constipated, lactulose ordered. also in preparation for [facility] will dc prn IV DIlaudid, cont prn OXy IR  -3/10:  ptn is awake, alert, wife at bedside, i instructed them to give rehab choices. also awaiting SPC change to be done by urology. pain is controlled  -3/9: seen by PT, will refer to [facility]. choices to be given in AM. this was d/w ptn, daughter, wife.   -3/8:  ptn didnt want to get PT eval done, will reorder. ptn needs [facility] placement. PMNR consult ordered  - 3/7: ptn is alert , pain is controlled, DC planning d/w ptn and HCP, daughter Yanique  -3/6:  ptn is awake, alert, ecchymotic feet look improved, pain is controlled. DC planning to [facility]  3/4-5 - s/p b/l iliac arteries angioplasty and stent placements on 3/3. today has new ecchymoses in b/l LE, concern for arterial insufficiency. vascular aware.. wound from Left knee immobilizer is dressed and healing. pain is controlled.   LEFT UE AVF placement/scheduling will be on outptn basis as per vascular. dc planning to [facility]  - 3/3: ptn is s/p angiogram RLE : b/l iliac arteries w successful angioplasty and stents. in PACU. awaitng HD.  ptn has a pressure wound from the LLE immobilizer posteriorly proximal to the knee. its been on 2/2 knee fracture, applied by ortho and managed by ptn's wife as per ptn's and wife's request , this was d/w nursing and nurse manager ms Ruiz.. immobilizer should be managed by orthopedics and nursing. will keep it off, only keep on when repositioning for care and then remove. wound care to F/U . this was discussed at length w daughter Yanique and wife Dee Dee.   -3/1-3/2: ptn is smiling, pain free, had HD 2/28 and 3/1, awaiting RLE angiogram 3/3, on Heparin drip, euvolemic  -2/28: ptn is lethargic, awaiting RLE angiogram possible fem-fem bypass hopefully on 3/3 pending OR availability, awaiting HD today    -2/27:  plan for angiogram in am with possible angioplasty, possible stent, possible fem-fem bypass. medically cleared for angiogram and possible surgery, stent, angioplasty. pain is controlled. remains on heparin drip as per vascular. HD as per renal    -2/26:  ptn is sleeping, pain is controlled, he was seen by wound care and vascular attending. planning on angiogram 2/2 severe PAD in LE on CTA. he also spoke to HCP. ptn and HCP in agreement. ptn was started on HEPARIN drip as per vascular recs. RIJ permacath done 2/25    - 2/25: ptn states he is hallucinating, but not at present, his MS is at baseline, his pain is controlled, he still needs prn pain meds when he is moved in the bed or for testing or for HD. awaiting Permacath, AVF creation, LE bypass to be d/w Dr. Swenson and the ptn tomorrow. ptn has cardiology clearance  if he opts for. Ptn has sacral decubiti and posterior thigh and buttock decibiti and b/l heel decubiti. Z flow fabienne d/w RN, get wound care consult    -2/24: pain is controlled  if the LLE is immobile and fully extended. doesn't tolerate the knee immobilizer. has a medium condyle and acute on chronic patella fx in LEFT knee. as per vascular ptn will need iliac stent  w a fem-fem bypass, possible axillo-femoral bypass. for this surgery he would need cardiac work up . more pressing surgery is LUE AVF creation,  in IR will arrange for Permacath. for pain control: Motrin 400 mg q12H, Oxy ER 30 mg q12H, Oxy IR 10 for mod pain and dilaudid 2 mg iv for severe pain. still has pruritis though improved, will raise atarax 25 mg to tid. plan of care d/w daughter Norma Persaud , ptn and his wife. Also d/w renal, card, vascular, ACP    -2/23: Daughter Yanique is the HCP, she and the ptn filled out the paperwork. daily plan and findings d/w her and the ptn. MS is at abseline, c/o b/l LE foot pain and Left Knee pain. xrays of left knee: cannot r/o acute on chronic inferior pole patellar Fx. knee immobilizer is on, awaiting ortho consult. doubt needs any intervention awaiting Ct chest today, will add CT Left knee. ptn states itching has recurred, severe pain has recurred. will resume Atatrax ( 25 mg bid) and Oxycodone ER , but at a lower dose 15 mg q12H. cont prn analgesics. HD as per renal, awaiting Vascular consult f/u re CTA A/L &LE and recs. ptn also wants AVF surgery on this admission. Coughing has stopped    - 2/22: ptn is drowsy but arousable, calmer today, answers questions appropriately. he is pain free, he stopped coughing, he denies having pruritis: will DC:  OXY ER, Atarax, Tessalon perles.     -  2/21: ptn is tearful, a bit confused, coughing, recognizes me and family at bedside. GOC d/w daughter and wife. AMS prob delirium 2/2 acute illness +/- opiods, lyrica, atarac. will lower atarak to tid, dc lyrica, cont Oxy 2/2 ptn has severe LE pain. neuro called for eval. Head ct done yesterday w no acute findings. CTA A/P w LE run fof: severe PAD, vascular to follow up on plan fo care. plan for HD tomorrow and next week place on tiw schedule of MWF. will need tunneled catheter prior to DC and will d/w vascular scheduling of AVF. ptn wants all to be done while inptn. daughter wants to be HCP as per ptn's wishes. she was given paperwork to get it signed and witnessed and will present to nursing thereafter. details of findings and complaints and plan of care d/w daughter and wife. spent 60 min. RVP ordered. start mucinex , tessalon perles, duonebs, get CT chest to r/o PNA. pulm called    -  2/20:  ptn had RRT 2/2 AMS and tremors. no SZ, no LOC. noted to have Na 123( hyponatremia), given 500 cc NS. Gabapentin DCed. ptn had been taking gabapentin at home PTA. Pain is controlled. Pruritis resolved, tolerating HD otherwise.     - Pain management:  cont Oxycodone 10 IR q6H,  DIlaudid prn severe pain 2 mg q4H prn.   - cont outptn meds  - DVT ppx w HSC

## 2025-04-16 NOTE — PROGRESS NOTE ADULT - ASSESSMENT
75 year old male with CKD, sp polio, paraplegia with associated neurogenic bladder s/p suprapubic catheter who was admitted s/p fall on 2/16.   ESRD, s/p DEYA [place] 2/17 ans started on HD  2/20 s/p RRT for tremors and confusion -- pt with chronic tremors although notably worse  SPC site with chronic/stable inflammatory changes, no drainage - no sign of SSTI  CTA abd with occlusion of b/l external iliac arteries and b/l superficial femoral arteries with distal reconstitution at popliteal arteries; patent three vessel runoff of RLE with 2 vessel runoff of LLE with occlusion of L mid anterior tibial artery with distal reconstitution  Worsening PAD with worsening ischemic changes, necrotic toes, s/p RLE angiogram b/l iliac artery stents by Vascular 3/3    3/16 no tenderness at suprapubic catheter site, no visible erythema at catheter site on exam  UA w/ many epithelial cells, Ucx negative  s/p zosyn 3/14-3/16  3/19 SPC site remains without erythema but now noted with some tan crusting on dressing  Treated for possible SPC site infection with bactrim 3/19-3/25  3/23 reporting vomiting x5 episodes, no diarrhea or other focal sx  3/25 CT Chest noted RLL consolidative opacities, LLL opacities - pneumonia vs atelectasis, few ill defined nodular opacities in LLL infectious vs inflammatory  3/25 CTAP with subcutaneous gas and infiltration tracking along medial R buttock, possibly related to decubitus wound with gas tracking from the external environment, possible necrotizing air forming infection; rectal wall thickening suggestive of proctitis; b/l mild hydroureteronephrosis to level of bladder with no obstruction and diffuse urothelial thickening of b/l renal collecting systems and ureters similar to 2/20  MRSA screen has been negative   no resp symptoms- no cough, dyspnea or pain, may have aspirated with vomiting earlier, saturating well on RA  3/26 s/p bedside debridement of L knee wound  as dry eschar  from wound edges without drainage and bedside excisional debridement of unstageable sacrum to left buttock into perineum evolving unstageable pressure injury through necrotic dermis into nonviable subcutaneous tissues, debulking debridement of necrotic tissue done by Wound care; other wounds noted including L calf to ankle wound with liquefaction necrotic drainage; R ischium wound with moist pink granular tissue and L buttock fading DTI  3/30 overnight febrile to 100.9F ?inflammatory -- no further fevers noted, WBC wnl, was on meropenem   Completed meropenem x10d course on 4/3   Sacral wound with eschar with surrounding red granulation tissue, noted no obv acute infection, no sig purulence, no sig surrounding erythema noted, remains afebrile, WBC wnl, nontoxic appearing   4/8 s/p OR for sacral and thigh wound debridement    -- brief op [id number] sacral wound with necrotic tissue and rind. Debridement was performed with sharp dissection followed by pulse irrigation. L thigh wounds x2 in posterior thigh s/p debridement with sharp dissection, +purulence    -- OR culture with Pseudomonas aeruginosa, CRE-E. coli and VRE-E.faecium - sensitivities reviewed    -- path noted with necrotic fibrous and fibroadipose tissue; yeast forms with no inflammation-likely colonization   4/10 patient allowed surgery and wound care teams to change dressings, unable to take prn opiates at times d/t pain  Leukocytosis noted, suspect reactive, remains afebrile, nontoxic appearing   4/14 s/p RRT for hypotension, had HD earlier, s/p IVF and midodrine  4/14 overnight s/p RRT for AMS, responded to sternal rub and answered appropriated, annoyed at presence of RRT team   - noted some concern for aspiration as pt ate soup prior to RRT  4/15 afebrile, later noted with AMS and myoclonic jerks, WBC trended upward  4/15 CT chest with tracheobronchial secretions with multifocal impaction and likely superimposed infectious/pulm process      Recommendations:   Follow 4/15 Bcx, in process   Discontinued cefepime   Started on meropenem 500mg IV Q24h (renally dosed, on HD days give post HD)  Continue daptomycin 10mg/kg IV Q48h (renally dosed, if on HD day--give post HD)   - baseline CPK wnl, check weekly cpk  -- as patient bedbound long term IV antibiotics unlikely to provide much benefit and will have adverse effects including risk of C.diff, disruption of gut microbiome, selection for resistant organisms making treatment of any infection more difficult in the future but given purulence seen on I&D, will treat for now with 6 week course   Surgery and Wound care teams following  Continue local wound care, nutrition, offloading as able  Monitor temps/WBC  HD per renal    Contact isolation per IC protocol   Continue rest of care per primary team       Bridgette Ramirez M.D.  [facility]  Available on [employer] TEAMS - *PREFERRED*  [phone]  After 5pm on weekdays and all day on weekends - please call [phone]     Thank you for consulting us and involving us in the management of this patients case. In addition to reviewing history, imaging, documents, labs, microbiology, took into account antibiotic stewardship, local antibiogram and infection control strategies and potential transmission issues at time of treatment decision making process.

## 2025-04-16 NOTE — PROGRESS NOTE ADULT - PROBLEM SELECTOR PLAN 2
-CXR with elevated R hemidiaphragm (not present on CXR in December 2024)  -CT chest with LLL, RLL GGO, mucoid impacted airways. No clear PNA  -Suggest Duoneb q6h  -S/p Mucomyst x5 days   -Incentive spirometry   -CXR 3/1 with improved R hemidiaphragm, low lung volumes on L  -CXR 4/9 grossly clear.  4/12:  his last ct scan chest suggest old fibrin sheath in rt Int jugular vein : or non occlusive thrombus:  would suggest to do USG of rigth IJV  4/15: cont current care:     4/13: seen by vascular:  no role for ac:  he would need repeat duplex after dc with vascular surgery  4/14: as above:  has some productive cough : monitor for fever  :  he has once dev eloped pneumonia whilke in hospital  4/16: doing poorly from pulm perspective today   ; cont aggressive chest pt:  start vest if he can tolerate and cont BD : cont antibiotics

## 2025-04-16 NOTE — PROGRESS NOTE ADULT - SUBJECTIVE AND OBJECTIVE BOX
[facility]  SABINO Griffin Y. Patel, S. Shah, G. Casimir  [phone]  ([phone] - weekdays after 5pm and weekends)    Name: BRIAN DEMPSEY  Age/Gender: 75y Male  MRN: [id number]    Interval History:  Patient seen and examined this morning.   Resting comfortably, confused, denies pain  Notes reviewed. Afebrile   D/w overnight RN.   Allergies: gabapentin (Other)      Objective:  Vitals:   T(F): 98.9 (04-16-25 @ 08:55), Max: 98.9 (04-16-25 @ 08:55)  HR: 67 (04-16-25 @ 08:55) (67 - 97)  BP: 119/65 (04-16-25 @ 08:55) (100/63 - 130/90)  RR: 18 (04-16-25 @ 08:55) (18 - 18)  SpO2: 95% (04-16-25 @ 08:55) (92% - 98%)  Physical Examination:  General: no acute distress  HEENT: normocephalic, atraumatic, anicteric  Respiratory: decreased breath sounds b/l   Cardiovascular: S1 and S2 present, normal rate  Gastrointestinal: soft, nontender, nondistended, SPC  Neuro: AAOxself, unable to tell where he is or year  Extremities: b/l lower extremity dressings     Laboratory Studies:  CBC:                       11.5   16.69 )-----------( 278      ( 15 Apr 2025 10:37 )             38.4     WBC Trend:  16.69 04-15-25 @ 10:37  9.32 04-12-25 @ 10:10  11.15 04-11-25 @ 09:03  8.84 04-10-25 @ 09:30    CMP: 04-15    133[L]  |  97  |  22  ----------------------------<  153[H]  5.1   |  22  |  3.36[H]    Ca    7.6[L]      15 Apr 2025 10:37  Phos  4.6     04-15  Mg     2.0     04-15    Creatinine: 3.36 mg/dL (04-15-25 @ 10:37)  Creatinine: 2.74 mg/dL (04-12-25 @ 10:10)  Creatinine: 3.43 mg/dL (04-11-25 @ 09:03)  Creatinine: 2.92 mg/dL (04-10-25 @ 09:30)    Microbiology: reviewed   Culture - Fungal, Other (collected 04-08-25 @ 20:58)  Source: Other Sacral Wound Swab  Preliminary Report (04-12-25 @ 23:03):    Culture is being performed. Fungal cultures are held for 4 weeks.    Culture - Acid Fast - Other w/Smear (collected 04-08-25 @ 20:58)  Source: Other Sacral Wound Swab  Preliminary Report (04-12-25 @ 23:07):    Culture is being performed.    Culture - Wound Aerobic (collected 04-08-25 @ 20:58)  Source: Other Sacral Wound Swab  Final Report (04-12-25 @ 01:17):    Few Pseudomonas aeruginosa    Rare Escherichia coli (Carbapenem Resistant)    Numerous Enterococcus faecium (vancomycin resistant)  Organism: Pseudomonas aeruginosa  Escherichia coli (Carbapenem Resistant)  Enterococcus faecium (vancomycin resistant) (04-12-25 @ 01:17)  Organism: Escherichia coli (Carbapenem Resistant) (04-12-25 @ 01:17)      -  Levofloxacin: R >4      -  Tobramycin: S <=2      -  Ceftazidime/Avibactam: S <=4      -  Aztreonam: R >16      -  Gentamicin: S <=2      -  Cefazolin: R >16      -  Cefepime: SDD 4 The breakpoint for susceptible dose dependent may require the usage of a higher cefepime dose (equivalent to adult dose of 2g q8h for normal renal function) for successful treatment.      -  Piperacillin/Tazobactam: R 32      -  Ciprofloxacin: R >2      -  Imipenem: I 2      -  Ceftriaxone: R 32      -  Ampicillin: R >16 These ampicillin results predict results for amoxicillin      Method Type: PRIMITIVO      -  Meropenem: S <=1      -  Ampicillin/Sulbactam: R >16/8      -  Amoxicillin/Clavulanic Acid: R >16/8      -  Trimethoprim/Sulfamethoxazole: S 1/19      -  Ceftolozane/tazobactam: I 4      -  Ertapenem: I 1  Organism: Escherichia coli (Carbapenem Resistant) (04-12-25 @ 01:17)      -  Resistance Gene to Carbapenem: Detec      Method Type: CarbaR      -  Resistance Gene KPC: Detec  Organism: Enterococcus faecium (vancomycin resistant) (04-12-25 @ 01:17)      -  Daptomycin: SDD 2 The breakpoint for SDD (susceptible dose dependent)is based on a dosage regimen of 8-12 mg/kg administered every 24 h in adults and is intended for serious infections due to E. faecium. Consultation with an infectious diseases specialist is recommended.      -  Linezolid: S <=1      -  Vancomycin: R >16      -  Ampicillin: R >8 Predicts results to ampicillin/sulbactam, amoxacillin-clavulanate and  piperacillin-tazobactam.      Method Type: PRIMITIVO  Organism: Pseudomonas aeruginosa (04-12-25 @ 01:17)      -  Levofloxacin: R >4      -  Aztreonam: R >16      -  Cefepime: S 8      -  Piperacillin/Tazobactam: S 16      -  Ciprofloxacin: R >2      -  Imipenem: S <=1      Method Type: PRIMITIVO      -  Meropenem: S <=1      -  Ceftazidime: S 8    Radiology: reviewed     < from: CT Chest No Cont (04.15.25 @ 23:03) >  FINDINGS:    AIRWAYS AND LUNGS: Tracheobronchial secretions.  Patchy and clustered   bilateral lung opacities. Partial atelectasis both lower lobes.    MEDIASTINUM AND PLEURA: There are no enlarged mediastinal, hilar or   axillary lymph nodes. The visualized portion of the thyroid gland is   unremarkable. There are small bilateral pleural effusions.  There is no   pneumothorax.    HEART AND VESSELS: The heart is normal in size.  There are   atherosclerotic calcifications of theaorta and coronary arteries.  There   is no pericardial effusion.  Aortic valve calcification. Right-sided   central venous catheter with tip in SVC    UPPER ABDOMEN: Images of the upper abdomen demonstrate no abnormality.    BONES AND SOFT TISSUES: There are mild degenerative changes of the spine.    The soft tissues are unremarkable.    IMPRESSION:  Tracheobronchial secretions with multifocal impaction and likely   superimposed infectious/pulmonary process.    --- End of Report ---    < end of copied text >    < from: CT Head No Cont (04.15.25 @ 23:03) >  IMPRESSION:  Mild chronic microvascular changes without evidence of an   acute transcortical infarction or hemorrhage.    --- End of Report ---    < end of copied text >    < from: Xray Chest 1 View- PORTABLE-Urgent (Xray Chest 1 View- PORTABLE-Urgent .) (04.14.25 @ 22:35) >  FINDINGS:  Right IJ central venous catheter with tip in the SVC.  The heart size is not well evaluated on this projection.  Elevated right hemidiaphragm. No focal consolidation.  There is no pneumothorax or large pleural effusion.    IMPRESSION:  No focal consolidation.    --- End of Report ---    < end of copied text >  Medications:  albuterol/ipratropium for Nebulization 3 milliLiter(s) Nebulizer every 6 hours  aspirin  chewable 81 milliGRAM(s) Oral daily  atorvastatin 40 milliGRAM(s) Oral at bedtime  bacitracin/polymyxin B Ointment 1 Application(s) Topical two times a day  benzocaine/menthol Lozenge 1 Lozenge Oral three times a day PRN  bisacodyl 5 milliGRAM(s) Oral every 12 hours PRN  cefepime   IVPB 1000 milliGRAM(s) IV Intermittent every 24 hours  chlorhexidine 2% Cloths 1 Application(s) Topical daily  clopidogrel Tablet 75 milliGRAM(s) Oral daily  Dakins Solution - 1/4 Strength 1 Application(s) Topical every 8 hours  DAPTOmycin IVPB 900 milliGRAM(s) IV Intermittent every 48 hours  heparin   Injectable 5000 Unit(s) SubCutaneous every 8 hours  HYDROmorphone  Injectable 1 milliGRAM(s) IV Push every 6 hours PRN  hydrOXYzine hydrochloride 25 milliGRAM(s) Oral three times a day  ibuprofen  Tablet. 400 milliGRAM(s) Oral every 12 hours  lactobacillus acidophilus 1 Tablet(s) Oral two times a day with meals  methocarbamol 750 milliGRAM(s) Oral every 8 hours  midodrine. 10 milliGRAM(s) Oral <User Schedule>  mupirocin 2% Ointment 1 Application(s) Topical <User Schedule>  Nephro-margaret 1 Tablet(s) Oral daily  oxyCODONE    IR 10 milliGRAM(s) Oral every 4 hours PRN  oxyCODONE  ER Tablet 20 milliGRAM(s) Oral every 8 hours  pantoprazole    Tablet 40 milliGRAM(s) Oral before breakfast  polyethylene glycol 3350 17 Gram(s) Oral daily  povidone iodine 10% Solution 1 Application(s) Topical two times a day  senna 2 Tablet(s) Oral at bedtime  sodium chloride 0.9% lock flush 10 milliLiter(s) IV Push every 1 hour PRN    Current Antimicrobials:  cefepime   IVPB 1000 milliGRAM(s) IV Intermittent every 24 hours  DAPTOmycin IVPB 900 milliGRAM(s) IV Intermittent every 48 hours    Prior/Completed Antimicrobials:  piperacillin/tazobactam IVPB.  piperacillin/tazobactam IVPB.  piperacillin/tazobactam IVPB.-  piperacillin/tazobactam IVPB.-  trimethoprim   80 mG/sulfamethoxazole 400 mG  trimethoprim  160 mG/sulfamethoxazole 800 mG  vancomycin  IVPB  vancomycin  IVPB

## 2025-04-17 NOTE — PROGRESS NOTE ADULT - PROBLEM SELECTOR PLAN 2
-CXR with elevated R hemidiaphragm (not present on CXR in December 2024)  -CT chest with LLL, RLL GGO, mucoid impacted airways. No clear PNA  -Suggest Duoneb q6h  -S/p Mucomyst x5 days   -Incentive spirometry   -CXR 3/1 with improved R hemidiaphragm, low lung volumes on L  -CXR 4/9 grossly clear.  4/12:  his last ct scan chest suggest old fibrin sheath in rt Int jugular vein : or non occlusive thrombus:  would suggest to do USG of rigth IJV  4/15: cont current care:     4/13: seen by vascular:  no role for ac:  he would need repeat duplex after dc with vascular surgery  4/14: as above:  has some productive cough : monitor for fever  :  he has once dev eloped pneumonia whilke in hospital  4/16: doing poorly from pulm perspective today   ; cont aggressive chest pt:  start vest if he can tolerate and cont BD : cont antibiotics  4/17: cont supportive care:  pt is actively dying

## 2025-04-17 NOTE — PROGRESS NOTE ADULT - ASSESSMENT
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  Requiring debridement of sacral wound.  Palliative following for goals of care and symptoms management.

## 2025-04-17 NOTE — PROGRESS NOTE ADULT - WHAT MATTERS MOST
Patient's comfort
improve patient' pain management and wound healing in order to recover patient mobility

## 2025-04-17 NOTE — PROGRESS NOTE ADULT - CONVERSATION DETAILS
I spoke with Cori this morning and informed me that the patient and family opted for comfort-focused care last night, including discontinuation of dialysis. We discussed life expectancy, which could range from days to a few weeks without dialysis.    We also discussed comfort measures in detail, explaining that the goal is to prioritize end-of-life symptom management and de-escalate other interventions that would be burdensome, such as blood draws, imaging, investigations, and vital sign monitoring. Cori agreed to discontinue non-palliative medications and administer intravenous opioids for symptom management, understanding the potential for hypotension. She also agreed to stop further investigative testing.     We offered transfer to the palliative care unit for symptom management, explaining that our unit's goal is to manage symptoms and support both the patient and family. Cori inquired about the difference between hospice and PCU, and I explained that this hospital does not have an inpatient hospice unit. However, given the discontinuation of dialysis, it is likely the patient will remain in the PCU until death. Without dialysis, we anticipate the development of symptoms such as dyspnea and metabolic encephalopathy, which would necessitate PCU level care.

## 2025-04-17 NOTE — PROGRESS NOTE ADULT - PROVIDER SPECIALTY LIST ADULT
Cardiology
Gastroenterology
Gastroenterology
Infectious Disease
Internal Medicine
Nephrology
Neurology
Podiatry
Pulmonology
Pulmonology
Surgery
Vascular Surgery
Wound Care
Anesthesia
Cardiology
Gastroenterology
Infectious Disease
Internal Medicine
Nephrology
Neurology
Podiatry
Pulmonology
Surgery
Surgery
Vascular Surgery
Wound Care
Cardiology
Gastroenterology
Infectious Disease
Internal Medicine
Nephrology
Neurology
Pulmonology
Urology
Vascular Surgery
Gastroenterology
Infectious Disease
Internal Medicine
Nephrology
Neurology
Pain Medicine
Pain Medicine
Podiatry
Pulmonology
Pulmonology
Surgery
Vascular Surgery
Wound Care
Wound Care
Cardiology
Gastroenterology
Internal Medicine
Pulmonology
Pulmonology
Surgery
Pulmonology
Cardiology
Pulmonology
Pulmonology
Cardiology
Pulmonology
Cardiology
Cardiology
Pulmonology
Pulmonology
Cardiology
Pulmonology
Pulmonology
Cardiology
Pulmonology
Pulmonology
Cardiology
Pulmonology
Cardiology
Pulmonology
Cardiology
Pulmonology
Cardiology
Pulmonology
Pulmonology
Cardiology
Pulmonology
Pulmonology
Cardiology
Cardiology
Pulmonology
Cardiology
Cardiology
Palliative Care
Pulmonology
Palliative Care
Pulmonology
Palliative Care
Pulmonology
Palliative Care

## 2025-04-17 NOTE — PROGRESS NOTE ADULT - PROBLEM SELECTOR PLAN 3
Multifactorial in the setting of renal failure, uncontrolled pain, prolong hospital stay.   - CT Head No Cont (04.15.25 @ 23:03) Mild chronic microvascular changes without evidence of an acute transcortical infarction or hemorrhage  - Monitor for constipation, urinary retention, pain, hunger, thirst, etc.  Promote sleep wake cycle and reorientation as indicated.  - IV Ativan 0.5 mg q4hrs PRN for anxiety/ agitation CKD5, HD was started during this admission   - Family opted for comfort-focused care last night, including discontinuation of dialysis. We discussed life expectancy, which could range from days to a few weeks without dialysis.  - symptom directed approach.

## 2025-04-17 NOTE — DISCHARGE NOTE FOR THE EXPIRED PATIENT - HOSPITAL COURSE
75 year old male with CKD, polio (wheelchair bound at baseline), paraplegia with associated neurogenic bladder s/p suprapubic catheter who was admitted s/p fall on . Patient and family declined CT imaging of the head; imaging of the knee demonstrated possible acute on chronic nondisplaced left inferior pole patellar fractures, for which patient was seen by orthopedics surgery with recommendation of knee immobilizer. Patient with history of ESRD, s/p DEYA [place] , requiring hemodialysis. Hospital course was complicated by severe PVD requiring bilateral iliac stent placement with vascular surgery, Pneumonia and proctocolitis requiring antibiotics. Additionally, course was further complicated by sacral and left posterior thigh wound, requiring antibiotics and debridement. Patient also noted to have CRE and VRE bacteremia, which were being managed with antibiotics. Patient  on 2025 at 8:37 PM  75 year old male with CKD, polio (wheelchair bound at baseline), paraplegia with associated neurogenic bladder s/p suprapubic catheter who was admitted s/p fall on . Patient and family declined CT imaging of the head; imaging of the knee demonstrated possible acute on chronic nondisplaced left inferior pole patellar fractures, for which patient was seen by orthopedic surgery with recommendation of knee immobilizer. Patient with history of ESRD, s/p DEYA [place] , requiring hemodialysis. Hospital course was complicated by severe PVD requiring bilateral iliac stent placement with vascular surgery, Pneumonia and proctocolitis requiring antibiotics. Additionally, course was further complicated by sacral and left posterior thigh wound, requiring antibiotics and debridement. Patient also noted to have CRE and VRE bacteremia, which were being managed with antibiotics. Also, with acute hypoxic respiratory failure requiring non-rebreather mask. Patient ultimately made comfort care on 2025. Patient  on 2025 at 8:37 PM.

## 2025-04-17 NOTE — PROGRESS NOTE ADULT - PROBLEM/PLAN-2
DISPLAY PLAN FREE TEXT

## 2025-04-17 NOTE — PROGRESS NOTE ADULT - PROBLEM/PLAN-1
DISPLAY PLAN FREE TEXT
Received report. Assumed care. Patient resting quietly. Safety maintained.  
DISPLAY PLAN FREE TEXT

## 2025-04-17 NOTE — CHART NOTE - NSCHARTNOTEFT_GEN_A_CORE
EXPIRATION NOTE     Death Note: Called to see the patient for unresponsiveness.     On exam: Physical exam showed patient in bed, unresponsive to verbal or noxious stimuli.  Pupils are fixed and dilated.  No spontaneous respirations.  Skin pale. Absent heart and breath sounds.  No palpable carotid or radial pulses.  Absent peripheral pulses. EKG rhythm strip shows asystole. Patient pronounced  on 2025 at 8:37 PM. Attending Dr. Young notified. Health care proxy Cori notified via phone call. Patient's spouse at bed notified. HCP declined autopsy.     Ad Ngo PA-C  Dept. of Medicine

## 2025-04-17 NOTE — PROGRESS NOTE ADULT - ASSESSMENT
75 year old male with CKD, sp polio, paraplegia with associated neurogenic bladder s/p suprapubic catheter who was admitted s/p fall on 2/16.   ESRD, s/p DEYA boston 2/17 ans started on HD  2/20 s/p RRT for tremors and confusion -- pt with chronic tremors although notably worse  SPC site with chronic/stable inflammatory changes, no drainage - no sign of SSTI  CTA abd with occlusion of b/l external iliac arteries and b/l superficial femoral arteries with distal reconstitution at popliteal arteries; patent three vessel runoff of RLE with 2 vessel runoff of LLE with occlusion of L mid anterior tibial artery with distal reconstitution  Worsening PAD with worsening ischemic changes, necrotic toes, s/p RLE angiogram b/l iliac artery stents by Vascular 3/3    3/16 no tenderness at suprapubic catheter site, no visible erythema at catheter site on exam  UA w/ many epithelial cells, Ucx negative  s/p zosyn 3/14-3/16  3/19 SPC site remains without erythema but now noted with some tan crusting on dressing  Treated for possible SPC site infection with bactrim 3/19-3/25  3/23 reporting vomiting x5 episodes, no diarrhea or other focal sx  3/25 CT Chest noted RLL consolidative opacities, LLL opacities - pneumonia vs atelectasis, few ill defined nodular opacities in LLL infectious vs inflammatory  3/25 CTAP with subcutaneous gas and infiltration tracking along medial R buttock, possibly related to decubitus wound with gas tracking from the external environment, possible necrotizing air forming infection; rectal wall thickening suggestive of proctitis; b/l mild hydroureteronephrosis to level of bladder with no obstruction and diffuse urothelial thickening of b/l renal collecting systems and ureters similar to 2/20  MRSA screen has been negative   no resp symptoms- no cough, dyspnea or pain, may have aspirated with vomiting earlier, saturating well on RA  3/26 s/p bedside debridement of L knee wound  as dry eschar  from wound edges without drainage and bedside excisional debridement of unstageable sacrum to left buttock into perineum evolving unstageable pressure injury through necrotic dermis into nonviable subcutaneous tissues, debulking debridement of necrotic tissue done by Wound care; other wounds noted including L calf to ankle wound with liquefaction necrotic drainage; R ischium wound with moist pink granular tissue and L buttock fading DTI  3/30 overnight febrile to 100.9F ?inflammatory -- no further fevers noted, WBC wnl, was on meropenem   Completed meropenem x10d course on 4/3   Sacral wound with eschar with surrounding red granulation tissue, noted no obv acute infection, no sig purulence, no sig surrounding erythema noted, remains afebrile, WBC wnl, nontoxic appearing   4/8 s/p OR for sacral and thigh wound debridement    -- brief op [id number] sacral wound with necrotic tissue and rind. Debridement was performed with sharp dissection followed by pulse irrigation. L thigh wounds x2 in posterior thigh s/p debridement with sharp dissection, +purulence    -- OR culture with Pseudomonas aeruginosa, CRE-E. coli and VRE-E.faecium - sensitivities reviewed    -- path noted with necrotic fibrous and fibroadipose tissue; yeast forms with no inflammation-likely colonization    -- as patient bedbound long term IV antibiotics unlikely to provide much benefit and will have adverse effects including risk of C.diff, disruption of gut microbiome, selection for resistant organisms making treatment of any infection more difficult in the future but given purulence seen on I&D, was started on antibiotics    4/10 patient allowed surgery and wound care teams to change dressings, unable to take prn opiates at times d/t pain  4/14 s/p RRT for hypotension, had HD earlier, s/p IVF and midodrine  4/14 overnight s/p RRT for AMS, responded to sternal rub and answered appropriated, annoyed at presence of RRT team   - noted some concern for aspiration as pt ate soup prior to RRT  4/15 afebrile, later noted with AMS and myoclonic jerks, WBC trended upward  4/15 CT chest with tracheobronchial secretions with multifocal impaction and likely superimposed infectious/pulm process  4/15 Bcx NGTD x2 (24h)  GOC discussions noted, daughter/HCP would like comfort measures with symptoms directed approach, no further HD/other aggressive measures     Recommendations:   Palliative care following, on comfort measures  Discontinued meropenem/daptomycin  Continue local wound care, offloading as able  Contact isolation per IC protocol   Continue rest of care per primary team   Please call if any questions, thank you.     Bridgette Ramirez M.D.  [facility]  Available on [employer] TEAMS - *PREFERRED*  [phone]  After 5pm on weekdays and all day on weekends - please call [phone]     Thank you for consulting us and involving us in the management of this patients case. In addition to reviewing history, imaging, documents, labs, microbiology, took into account antibiotic stewardship, local antibiogram and infection control strategies and potential transmission issues at time of treatment decision making process.

## 2025-04-17 NOTE — PROGRESS NOTE ADULT - PROBLEM SELECTOR PLAN 2
CKD5, HD was started during this admission   - Family opted for comfort-focused care last night, including discontinuation of dialysis. We discussed life expectancy, which could range from days to a few weeks without dialysis.  - symptom directed approach. Placed on Non rebreather this morning.   - IV Dilaudid 1 mg q2hrs PRN for dyspnea   - Bowel regimen while on opioids.  Monitor for constipation.

## 2025-04-17 NOTE — PROGRESS NOTE ADULT - SUBJECTIVE AND OBJECTIVE BOX
Patient is a 75y old  Male who presents with a chief complaint of ESRD requiring HD, uremia (17 Apr 2025 09:35)      SUBJECTIVE / OVERNIGHT EVENTS: ptn is taking agonal breaths, not responding to verbal stimuli, om comfort care, taken off HD, family at bedside, crying. support provided. cont Robinul , dilaudid, ativan, prognosis is grave. will keep him on medical floor, this was d/w palliative attending.     MEDICATIONS  (STANDING):  bacitracin/polymyxin B Ointment 1 Application(s) Topical two times a day  chlorhexidine 2% Cloths 1 Application(s) Topical daily  Dakins Solution - 1/4 Strength 1 Application(s) Topical every 8 hours  lactobacillus acidophilus 1 Tablet(s) Oral two times a day with meals  mupirocin 2% Ointment 1 Application(s) Topical <User Schedule>  pantoprazole    Tablet 40 milliGRAM(s) Oral before breakfast  polyethylene glycol 3350 17 Gram(s) Oral daily  povidone iodine 10% Solution 1 Application(s) Topical two times a day  senna 2 Tablet(s) Oral at bedtime    MEDICATIONS  (PRN):  benzocaine/menthol Lozenge 1 Lozenge Oral three times a day PRN Sore Throat  bisacodyl 5 milliGRAM(s) Oral every 12 hours PRN Constipation  glycopyrrolate Injectable 0.4 milliGRAM(s) IV Push every 6 hours PRN excessive oral secretions  HYDROmorphone  Injectable 0.5 milliGRAM(s) IV Push every 2 hours PRN Moderate Pain (4 - 6)  HYDROmorphone  Injectable 1 milliGRAM(s) IV Push every 2 hours PRN Pain  HYDROmorphone  Injectable 1 milliGRAM(s) IV Push every 2 hours PRN respiratory distress  LORazepam   Injectable 0.5 milliGRAM(s) IV Push every 4 hours PRN Agitation  sodium chloride 0.9% lock flush 10 milliLiter(s) IV Push every 1 hour PRN Pre/post blood products, medications, blood draw, and to maintain line patency      Vital Signs Last 24 Hrs  T(F): 97.3 (04-17-25 @ 00:21), Max: 98.6 (04-16-25 @ 20:01)  HR: 110 (04-17-25 @ 08:22) (61 - 110)  BP: 91/40 (04-17-25 @ 08:22) (71/55 - 130/89)  RR: 18 (04-17-25 @ 00:21) (18 - 18)  SpO2: 100% (04-17-25 @ 01:37) (91% - 100%)  Telemetry:   CAPILLARY BLOOD GLUCOSE        I&O's Summary    16 Apr 2025 07:01  -  17 Apr 2025 07:00  --------------------------------------------------------  IN: 920 mL / OUT: 340 mL / NET: 580 mL    17 Apr 2025 07:01  -  17 Apr 2025 12:48  --------------------------------------------------------  IN: 120 mL / OUT: 0 mL / NET: 120 mL        PHYSICAL EXAM:  GENERAL: NAD, well-developed  HEAD:  Atraumatic, Normocephalic  EYES: EOMI, PERRLA, conjunctiva and sclera clear  NECK: Supple, No JVD  CHEST/LUNG: Clear to auscultation bilaterally; No wheeze  HEART: Regular rate and rhythm; No murmurs, rubs, or gallops  ABDOMEN: Soft, Nontender, Nondistended; Bowel sounds present  EXTREMITIES:  2+ Peripheral Pulses, No clubbing, cyanosis, or edema  PSYCH: AAOx3  NEUROLOGY: non-focal  SKIN: No rashes or lesions    LABS:                        10.5   7.83  )-----------( 247      ( 16 Apr 2025 13:42 )             33.9     04-16    135  |  99  |  30[H]  ----------------------------<  113[H]  4.9   |  22  |  3.99[H]    Ca    7.6[L]      16 Apr 2025 13:42  Phos  4.7     04-16            Urinalysis Basic - ( 16 Apr 2025 13:42 )    Color: x / Appearance: x / SG: x / pH: x  Gluc: 113 mg/dL / Ketone: x  / Bili: x / Urobili: x   Blood: x / Protein: x / Nitrite: x   Leuk Esterase: x / RBC: x / WBC x   Sq Epi: x / Non Sq Epi: x / Bacteria: x        RADIOLOGY & ADDITIONAL TESTS:    Imaging Personally Reviewed:    Consultant(s) Notes Reviewed:      Care Discussed with Consultants/Other Providers:

## 2025-04-17 NOTE — PROGRESS NOTE ADULT - TIME-BASED
35
50
35
70
90
65

## 2025-04-17 NOTE — PROGRESS NOTE ADULT - [place] SYMPTOM ASSESSMENT: OTHER PROBLEM DESCRIPTION
Unable to asses due to patient's medical condition
Unable to asses due to patient's medical condition

## 2025-04-17 NOTE — PROGRESS NOTE ADULT - PROBLEM SELECTOR PLAN 5
-S/p b/l iliac stent placement on 3/3/25  -Vascular, podiatry f/u    4/12: on antibiotics:  cont current care  and antibiotics:  defer to vascular surgery  4/13: cont antibiotics  4/14: antibiotics per ID  4/15: cont antibtiocs:" has lot of wounds  4/16: cont antibiotics:  4/17: off antibiotics now:

## 2025-04-17 NOTE — PROGRESS NOTE ADULT - PROBLEM SELECTOR PLAN 6
he was diagnosed with polio at the age of 18 months old,  associated neurogenic bladder/suprapubic catheter and other several complications. multiple wounds: largest sacral wound s/p debridement on 4/8  - pain management following   - Advise to premedicate with IV Dilaudid 15 min prior of wound clean   - ID recommending to stop antibiotics

## 2025-04-17 NOTE — PROGRESS NOTE ADULT - SUBJECTIVE AND OBJECTIVE BOX
Overnight events noted      VITAL:  T(C): , Max: 37.2 (04-16-25 @ 08:55)  T(F): , Max: 98.9 (04-16-25 @ 08:55)  HR: 106 (04-17-25 @ 01:37)  BP: 71/55 (04-17-25 @ 01:37)  BP(mean): --  RR: 18 (04-17-25 @ 00:21)  SpO2: 100% (04-17-25 @ 01:37)  Wt(kg): --      PHYSICAL EXAM:  Constitutional: lethargic/muted voice  HEENT: NCAT, DMM  Neck: Supple, No JVD  Respiratory: diminished   Cardiovascular: RRR s1s2, no m/r/g  Gastrointestinal: BS+, soft, NT/ND  : (+)suprapubic cath  Extremities: 1+ b/l LE edema, wrapped in dsg  Neurological: reduced generalized strength  Back: no CVAT b/l  Skin: gangrenous changes b/l feet  Access: [facility] tunneled cath    LABS:                        10.5   7.83  )-----------( 247      ( 16 Apr 2025 13:42 )             33.9     Na(135)/K(4.9)/Cl(99)/HCO3(22)/BUN(30)/Cr(3.99)Glu(113)/Ca(7.6)/Mg(--)/PO4(4.7)    04-16 @ 13:42  Na(133)/K(5.1)/Cl(97)/HCO3(22)/BUN(22)/Cr(3.36)Glu(153)/Ca(7.6)/Mg(2.0)/PO4(4.6)    04-15 @ 10:37        IMPRESSION: 74M w/ polio, neurogenic bladder/suprapubic catheter, 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     (2)GOC - clinical status has deteriorated over the past several days. After extended discussions over past 1-2 days, family is in agreement to discontinue HD/other aggressive measures; now Comfort Care      RECOMMEND:   (1)No more HD  (2)Oxycodone/prn Dilaudid as planned to minimize pain/provide comfort            Rafita Denny MD  [facility]  Office/on call physician: [phone]  Cell (7a-7p): [phone]       Minimally communicative at present - on NRBO2  Wife Dee Dee and daughter Opal at bedside    VITAL:  T(C): , Max: 37.2 (04-16-25 @ 08:55)  T(F): , Max: 98.9 (04-16-25 @ 08:55)  HR: 106 (04-17-25 @ 01:37)  BP: 71/55 (04-17-25 @ 01:37)  BP(mean): --  RR: 18 (04-17-25 @ 00:21)  SpO2: 100% (04-17-25 @ 01:37)  Wt(kg): --      PHYSICAL EXAM:  Constitutional: on NRB, minimally communicative  HEENT: NCAT, DMM  Neck: Supple, No JVD  Respiratory: diminished   Cardiovascular: RRR s1s2, no m/r/g  Gastrointestinal: BS+, soft, NT/ND  : (+)suprapubic cath  Extremities: 1+ b/l LE edema, wrapped in dsg  Neurological: reduced generalized strength  Back: no CVAT b/l  Skin: gangrenous changes b/l feet  Access: [facility] tunneled cath    LABS:                        10.5   7.83  )-----------( 247      ( 16 Apr 2025 13:42 )             33.9     Na(135)/K(4.9)/Cl(99)/HCO3(22)/BUN(30)/Cr(3.99)Glu(113)/Ca(7.6)/Mg(--)/PO4(4.7)    04-16 @ 13:42  Na(133)/K(5.1)/Cl(97)/HCO3(22)/BUN(22)/Cr(3.36)Glu(153)/Ca(7.6)/Mg(2.0)/PO4(4.6)    04-15 @ 10:37        IMPRESSION: 74M w/ polio, neurogenic bladder/suprapubic catheter, 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     (2)GOC - clinical status has deteriorated over the past several days. After extended discussions over past 1-2 days, family is in agreement to discontinue HD/other aggressive measures; now Comfort Care      RECOMMEND:   (1)No more HD  (2)Oxycodone/prn Dilaudid as planned to minimize pain/provide comfort            Rafita Denny MD  [facility]  Office/on call physician: [phone]  Cell (7a-7p): [phone]

## 2025-04-17 NOTE — PROGRESS NOTE ADULT - PROBLEM SELECTOR PLAN 7
-Per wound care  -Pain control  -S/p debridement on 4/8 4/14: cont antibiotics  4/15: cont antibiotics:  4/17: off antibiotics now:

## 2025-04-17 NOTE — PROGRESS NOTE ADULT - NSPROGADDITIONALINFOA_GEN_ALL_CORE
DW ACP
dw pt
dw pt
dw acp
dw pt
dw acp
called acp
dw pt
dw acp
dw pt
DENIZ ACP  : pt is not doing well
dw acp
dw acp
DENIZ ACP  : pt is not doing well    4/17: pt is DNR and DNI  : unresponsive:  on 100% NRB:  whole family at bedside:  : deniz pcp
note is incomplete until attending attestation

## 2025-04-17 NOTE — PROGRESS NOTE ADULT - PROBLEM SELECTOR PLAN 8
Plan to transfer to PCU when a bed become available, discussed with primary team     Recommendations:  - Comfort measures: vitals q daily, no blood draws, no rapids, no MEWs, no code strokes, only palliative medications as per discussion with family  - Above symptoms management regimen   - Chaplaincy following  - Holistic RN following HCP: daughter Cori  Code status: DNR/I     I spoke with Cori this morning and informed me that the patient and family opted for comfort-focused care last night, including discontinuation of dialysis. We discussed life expectancy, which could range from days to a few weeks without dialysis. We also discussed comfort measures in detail, offered transfer to the palliative care unit for symptom management. She agreed with plan     Please see above GOC note

## 2025-04-17 NOTE — PROGRESS NOTE ADULT - SUBJECTIVE AND OBJECTIVE BOX
SUBJECTIVE AND OBJECTIVE:  Indication for Geriatrics and Palliative Care Services/INTERVAL HPI: following for goals of care     OVERNIGHT EVENTS:  patient's healthcare proxy, Cori agreed to pursue comfort-focused care, including discontinuation of hemodialysis.      DNR on chart: yes   DNI      Allergies    gabapentin (Other)    Intolerances    MEDICATIONS  (STANDING):  bacitracin/polymyxin B Ointment 1 Application(s) Topical two times a day  chlorhexidine 2% Cloths 1 Application(s) Topical daily  Dakins Solution - 1/4 Strength 1 Application(s) Topical every 8 hours  lactobacillus acidophilus 1 Tablet(s) Oral two times a day with meals  mupirocin 2% Ointment 1 Application(s) Topical <User Schedule>  pantoprazole    Tablet 40 milliGRAM(s) Oral before breakfast  polyethylene glycol 3350 17 Gram(s) Oral daily  povidone iodine 10% Solution 1 Application(s) Topical two times a day  senna 2 Tablet(s) Oral at bedtime    MEDICATIONS  (PRN):  benzocaine/menthol Lozenge 1 Lozenge Oral three times a day PRN Sore Throat  bisacodyl 5 milliGRAM(s) Oral every 12 hours PRN Constipation  glycopyrrolate Injectable 0.4 milliGRAM(s) IV Push every 6 hours PRN excessive oral secretions  HYDROmorphone  Injectable 0.5 milliGRAM(s) IV Push every 2 hours PRN Moderate Pain (4 - 6)  HYDROmorphone  Injectable 1 milliGRAM(s) IV Push every 2 hours PRN Pain  HYDROmorphone  Injectable 1 milliGRAM(s) IV Push every 2 hours PRN respiratory distress  LORazepam   Injectable 0.5 milliGRAM(s) IV Push every 4 hours PRN Agitation  sodium chloride 0.9% lock flush 10 milliLiter(s) IV Push every 1 hour PRN Pre/post blood products, medications, blood draw, and to maintain line patency      ITEMS UNCHECKED ARE NOT PRESENT    PRESENT SYMPTOMS: [ ]Unable to self-report - see  CPOT, PAINADS, RDOS below  Source if other than patient:  [ ]Family   [ ]Team     Pain:  [ ]yes [ ]no  QOL impact -   Location -                    Aggravating factors -  Quality -  Radiation -  Timing-  Severity (0-10 scale):  Minimal acceptable level (0-10 scale):     Dyspnea:                           [ ]Mild [ ]Moderate [ ]Severe  Anxiety:                             [ ]Mild [ ]Moderate [ ]Severe  Fatigue:                             [ ]Mild [ ]Moderate [ ]Severe  Nausea:                             [ ]Mild [ ]Moderate [ ]Severe  Loss of appetite:              [ ]Mild [ ]Moderate [ ]Severe  Constipation:                    [ ]Mild [ ]Moderate [ ]Severe    PCSSQ[Palliative Care Spiritual Screening Question]   Severity (0-10):  Score of 4 or > indicate consideration of Chaplaincy referral.  Chaplaincy Referral: [x ] yes [ ] refused [ ] following    Caregiver [place]? : [ x] yes [ ] no  [ ] deferred:  Social work referral [ ] Patient & Family Centered Care Referral [ ]     Anticipatory Grief present?:  [ x] yes [ ] no  [ ] deferred: Social work referral [ ] Patient & Family Centered Care Referral [ ]      Other Symptoms:  [ ]All other review of systems negative     PHYSICAL EXAM:  Vital Signs Last 24 Hrs  T(C): 36.3 (17 Apr 2025 00:21), Max: 37 (16 Apr 2025 20:01)  T(F): 97.3 (17 Apr 2025 00:21), Max: 98.6 (16 Apr 2025 20:01)  HR: 110 (17 Apr 2025 08:22) (61 - 110)  BP: 91/40 (17 Apr 2025 08:22) (71/55 - 130/89)  BP(mean): --  RR: 18 (17 Apr 2025 00:21) (18 - 18)  SpO2: 100% (17 Apr 2025 01:37) (91% - 100%)    Parameters below as of 17 Apr 2025 01:37  Patient On (Oxygen Delivery Method): non rebreather     I&O's Summary    16 Apr 2025 07:01  -  17 Apr 2025 07:00  --------------------------------------------------------  IN: 920 mL / OUT: 340 mL / NET: 580 mL       GENERAL: [ ]Cachexia    [ ]Alert  [ ]Oriented x   [ ]Lethargic  [ ]Unarousable  [ ]Verbal  [ ]Non-Verbal  Behavioral:   [ ]Anxiety  [ ]Delirium [ ]Agitation [ ]Other  HEENT:  [ ]Normal   [ ]Dry mouth   [ ]ET Tube/Trach  [ ]Oral lesions  PULMONARY:   [ ]Clear [ ]Tachypnea  [ ]Audible excessive secretions   [ ]Rhonchi        [ ]Right [ ]Left [ ]Bilateral  [ ]Crackles        [ ]Right [ ]Left [ ]Bilateral  [ ]Wheezing     [ ]Right [ ]Left [ ]Bilateral  [ ]Diminished BS [ ] Right [ ]Left [ ]Bilateral  CARDIOVASCULAR:    [ ]Regular [ ]Irregular [ ]Tachy  [ ]Victor M [ ]Murmur [ ]Other  GASTROINTESTINAL:  [ ]Soft  [ ]Distended   [ ]+BS  [ ]Non tender [ ]Tender  [ ]Other [ ]PEG [ ]OGT/ NGT   Last BM:   GENITOURINARY:  [ ]Normal [ ]Incontinent   [ ]Oliguria/Anuria   [ ]Murillo  MUSCULOSKELETAL:   [ ]Normal   [ ]Weakness  [ ]Bed/Wheelchair bound [ ]Edema  NEUROLOGIC:   [ ]No focal deficits  [ ] Cognitive impairment  [ ] Dysphagia [ ]Dysarthria [ ] Paresis [ ]Other   SKIN:   [ ]Normal  [ ]Rash  [ ]Other  [ ]Pressure ulcer(s) [ ]y [ ]n present on admission    CRITICAL CARE:  [ ]Shock Present  [ ]Septic [ ]Cardiogenic [ ]Neurologic [ ]Hypovolemic  [ ]Vasopressors [ ]Inotropes  [ ]Respiratory failure present [ ]Mechanical Ventilation [ ]Non-invasive ventilatory support [ ]High-Flow   [ ]Acute  [ ]Chronic [ ]Hypoxic  [ ]Hypercarbic [ ]Other  [x ]Other organ failure: renal, brain, skin    LABS:                        10.5   7.83  )-----------( 247      ( 16 Apr 2025 13:42 )             33.9   04-16    135  |  99  |  30[H]  ----------------------------<  113[H]  4.9   |  22  |  3.99[H]    Ca    7.6[L]      16 Apr 2025 13:42  Phos  4.7     04-16  Mg     2.0     04-15        Urinalysis Basic - ( 16 Apr 2025 13:42 )    Color: x / Appearance: x / SG: x / pH: x  Gluc: 113 mg/dL / Ketone: x  / Bili: x / Urobili: x   Blood: x / Protein: x / Nitrite: x   Leuk Esterase: x / RBC: x / WBC x   Sq Epi: x / Non Sq Epi: x / Bacteria: x      RADIOLOGY & ADDITIONAL STUDIES: reviewed none new    Protein Calorie Malnutrition Present: [ ]mild [ ]moderate [ ]severe [ ]underweight [ ]morbid obesity  https://www.andeal.org/vault/2440/web/files/ONC/Table_Clinical%20Characteristics%20to%20Document%20Malnutrition-White%20JV%20et%20al%202012.pdf    Height (cm): 172.7 (04-08-25 @ 15:25), 172.7 (02-04-25 @ 16:31), 172.7 (12-20-24 @ 12:36)  Weight (kg): 90.7 (04-08-25 @ 15:25), 90.7 (02-04-25 @ 16:31), 94.3 (12-20-24 @ 12:36)  BMI (kg/m2): 30.4 (04-08-25 @ 15:25), 30.4 (02-04-25 @ 16:31), 31.6 (12-20-24 @ 12:36)    [x ]PPSV2 < or = 30%  [ ]significant weight loss [ ]poor nutritional intake [ ]anasarca[ ]Artificial Nutrition    Other REFERRALS:  [ ]Hospice  [ ]Child Life  [ ]Social Work  [ ]Case management [ ]Holistic Therapy         Palliative Performance Scale:  http://npcrc.org/files/news/palliative_performance_scale_ppsv2.pdf  (Ctrl +  left click to view)  Respiratory Distress Observation Tool:  https://homecareinformation.net/handouts/hen/Respiratory_Distress_Observation_Scale.pdf (Ctrl +  left click to view)  PAINAD Score:  http://geriatrictoolkit.[place].[email or web address]/cog/painad.pdf (Ctrl +  left click to view)       SUBJECTIVE AND OBJECTIVE:  Indication for Geriatrics and Palliative Care Services/INTERVAL HPI: following for goals of care     OVERNIGHT EVENTS:  patient's healthcare proxy, Cori agreed to pursue comfort-focused care, including discontinuation of hemodialysis.    Patient seen this morning, family at bedside, patient appears comfortable no evidence of distress due to pain.     DNR on chart: yes   DNI      Allergies    gabapentin (Other)    Intolerances    MEDICATIONS  (STANDING):  bacitracin/polymyxin B Ointment 1 Application(s) Topical two times a day  chlorhexidine 2% Cloths 1 Application(s) Topical daily  Dakins Solution - 1/4 Strength 1 Application(s) Topical every 8 hours  lactobacillus acidophilus 1 Tablet(s) Oral two times a day with meals  mupirocin 2% Ointment 1 Application(s) Topical <User Schedule>  pantoprazole    Tablet 40 milliGRAM(s) Oral before breakfast  polyethylene glycol 3350 17 Gram(s) Oral daily  povidone iodine 10% Solution 1 Application(s) Topical two times a day  senna 2 Tablet(s) Oral at bedtime    MEDICATIONS  (PRN):  benzocaine/menthol Lozenge 1 Lozenge Oral three times a day PRN Sore Throat  bisacodyl 5 milliGRAM(s) Oral every 12 hours PRN Constipation  glycopyrrolate Injectable 0.4 milliGRAM(s) IV Push every 6 hours PRN excessive oral secretions  HYDROmorphone  Injectable 0.5 milliGRAM(s) IV Push every 2 hours PRN Moderate Pain (4 - 6)  HYDROmorphone  Injectable 1 milliGRAM(s) IV Push every 2 hours PRN Pain  HYDROmorphone  Injectable 1 milliGRAM(s) IV Push every 2 hours PRN respiratory distress  LORazepam   Injectable 0.5 milliGRAM(s) IV Push every 4 hours PRN Agitation  sodium chloride 0.9% lock flush 10 milliLiter(s) IV Push every 1 hour PRN Pre/post blood products, medications, blood draw, and to maintain line patency      ITEMS UNCHECKED ARE NOT PRESENT    PRESENT SYMPTOMS: [ x]Unable to self-report - see  CPOT, PAINADS, RDOS below  Source if other than patient:  [x ]Family   [x ]Team     Pain:  [ ]yes [ ]no  QOL impact -   Location -                    Aggravating factors -  Quality -  Radiation -  Timing-  Severity (0-10 scale):  Minimal acceptable level (0-10 scale):     Dyspnea:                           [ ]Mild [ ]Moderate [ ]Severe  Anxiety:                             [ ]Mild [ ]Moderate [ ]Severe  Fatigue:                             [ ]Mild [ ]Moderate [ ]Severe  Nausea:                             [ ]Mild [ ]Moderate [ ]Severe  Loss of appetite:              [ ]Mild [ ]Moderate [ ]Severe  Constipation:                    [ ]Mild [ ]Moderate [ ]Severe    PCSSQ[Palliative Care Spiritual Screening Question]   Severity (0-10):  Score of 4 or > indicate consideration of Chaplaincy referral.  Chaplaincy Referral: [x ] yes [ ] refused [ ] following    Caregiver [place]? : [ x] yes [ ] no  [ ] deferred:  Social work referral [ ] Patient & Family Centered Care Referral [ ]     Anticipatory Grief present?:  [ x] yes [ ] no  [ ] deferred: Social work referral [ ] Patient & Family Centered Care Referral [ ]      Other Symptoms:  [ ]All other review of systems negative   Unable to asses due to patient's medical condition    PHYSICAL EXAM:  Vital Signs Last 24 Hrs  T(C): 36.3 (17 Apr 2025 00:21), Max: 37 (16 Apr 2025 20:01)  T(F): 97.3 (17 Apr 2025 00:21), Max: 98.6 (16 Apr 2025 20:01)  HR: 110 (17 Apr 2025 08:22) (61 - 110)  BP: 91/40 (17 Apr 2025 08:22) (71/55 - 130/89)  BP(mean): --  RR: 18 (17 Apr 2025 00:21) (18 - 18)  SpO2: 100% (17 Apr 2025 01:37) (91% - 100%)    Parameters below as of 17 Apr 2025 01:37  Patient On (Oxygen Delivery Method): non rebreather     I&O's Summary    16 Apr 2025 07:01  -  17 Apr 2025 07:00  --------------------------------------------------------  IN: 920 mL / OUT: 340 mL / NET: 580 mL       GENERAL: [ ]Cachexia    [ ]Alert  [ ]Oriented   [ ]Lethargic  [ ]Unarousable  [ ]Verbal   [x ]Non-Verbal  Behavioral:   [ ]Anxiety  []Delirium [ ]Agitation [ ]Other  HEENT:  [ ]Normal   [ x]Dry mouth   [ ]ET Tube/Trach  [ ]Oral lesions  PULMONARY:   [ ]Clear [ ]Tachypnea  [x ]Audible excessive secretions   [ ]Rhonchi        [ ]Right [ ]Left [ ]Bilateral  [ ]Crackles        [ ]Right [ ]Left [ ]Bilateral  [ ]Wheezing     [ ]Right [ ]Left [ ]Bilateral  [ ]Diminished BS [ ] Right [ ]Left [ ]Bilateral  CARDIOVASCULAR:    [ ]Regular [ ]Irregular [ x]Tachy  [ ]Victor M [ ]Murmur [ ]Other  GASTROINTESTINAL:  [ ]Soft  [x ]Distended   [ ]+BS  [ ]Non tender [ ]Tender  [ ]Other [ ]PEG [ ]OGT/ NGT   Last BM: 4/17  GENITOURINARY:  [ ]Normal [ ]Incontinent   [ ]Oliguria/Anuria   [ ]Murillo [x] suprapubic cath   MUSCULOSKELETAL:   [ ]Normal   [ ]Weakness  [x ]Bed/Wheelchair bound [ x]Edema  NEUROLOGIC:   [ ]No focal deficits  [ x Cognitive impairment  [ ] Dysphagia [x ]Dysarthria [ ] Paresis [ ]Other   SKIN: Please see RN and wound care documentation which I have reviewed.  Sacral, Rt Ischial, and Left Buttock wounds. BLE severe PAD w/ extensive wounds   [ ]Normal  [ ]Rash  [ ]Other  [ x]Pressure ulcer(s) [ x]y [ ]n present on admission    CRITICAL CARE:  [ ]Shock Present  [ ]Septic [ ]Cardiogenic [ ]Neurologic [ ]Hypovolemic  [ ]Vasopressors [ ]Inotropes  [ ]Respiratory failure present [ ]Mechanical Ventilation [ ]Non-invasive ventilatory support [ ]High-Flow   [ ]Acute  [ ]Chronic [ ]Hypoxic  [ ]Hypercarbic [ ]Other  [ x]Other organ failure - skin, kidney, brain       LABS:                        10.5   7.83  )-----------( 247      ( 16 Apr 2025 13:42 )             33.9   04-16    135  |  99  |  30[H]  ----------------------------<  113[H]  4.9   |  22  |  3.99[H]    Ca    7.6[L]      16 Apr 2025 13:42  Phos  4.7     04-16  Mg     2.0     04-15        Urinalysis Basic - ( 16 Apr 2025 13:42 )    Color: x / Appearance: x / SG: x / pH: x  Gluc: 113 mg/dL / Ketone: x  / Bili: x / Urobili: x   Blood: x / Protein: x / Nitrite: x   Leuk Esterase: x / RBC: x / WBC x   Sq Epi: x / Non Sq Epi: x / Bacteria: x      RADIOLOGY & ADDITIONAL STUDIES: reviewed none new    Protein Calorie Malnutrition Present: [ ]mild [ ]moderate [ ]severe [ ]underweight [ ]morbid obesity  https://www.andeal.org/vault/2440/web/files/ONC/Table_Clinical%20Characteristics%20to%20Document%20Malnutrition-White%20JV%20et%20al%695845.pdf    Height (cm): 172.7 (04-08-25 @ 15:25), 172.7 (02-04-25 @ 16:31), 172.7 (12-20-24 @ 12:36)  Weight (kg): 90.7 (04-08-25 @ 15:25), 90.7 (02-04-25 @ 16:31), 94.3 (12-20-24 @ 12:36)  BMI (kg/m2): 30.4 (04-08-25 @ 15:25), 30.4 (02-04-25 @ 16:31), 31.6 (12-20-24 @ 12:36)    [x ]PPSV2 < or = 30%  [ ]significant weight loss x[ ]poor nutritional intake [ ]anasarca[ ]Artificial Nutrition    Other REFERRALS:  [ ]Hospice  [ ]Child Life  [ ]Social Work  [ x]Case management [ ]Holistic Therapy                                          Progress Notes    PROGRESS NOTE  Date & Time of Note   2025-04-14 04:36    Notes    Notes: Chart reviewed. Patient remains medically acute, continues on IV ABT  Cefepime and daptomycin. DCP unclear. Spoke with daughter/hcp Cori and she  wants a GOC conversation to discuss realistic goals. ACP aware. Consult placed.  Will continue to follow with interdisciplinary team.       Electronically signed by:  Alexandra Gutiérrez RN  Electronically signed on:  2025-04-14  16:45    Palliative Performance Scale:  http://npcrc.org/files/news/palliative_performance_scale_ppsv2.pdf  (Ctrl +  left click to view)  Respiratory Distress Observation Tool:  https://homecareinformation.net/handouts/hen/Respiratory_Distress_Observation_Scale.pdf (Ctrl +  left click to view)  PAINAD Score:  http://[email or web address]/cog/painad.pdf (Ctrl +  left click to view)

## 2025-04-17 NOTE — PROGRESS NOTE ADULT - PROBLEM SELECTOR PLAN 4
- IV Glycopyrrolate 0.4mg q6hrs PRN   -  Turn and position for postural drainage. Multifactorial in the setting of renal failure, uncontrolled pain, prolong hospital stay.   - CT Head No Cont (04.15.25 @ 23:03) Mild chronic microvascular changes without evidence of an acute transcortical infarction or hemorrhage  - Monitor for constipation, urinary retention, pain, hunger, thirst, etc.  Promote sleep wake cycle and reorientation as indicated.  - IV Ativan 0.5 mg q4hrs PRN for anxiety/ agitation

## 2025-04-17 NOTE — PROGRESS NOTE ADULT - PROBLEM SELECTOR PROBLEM 1
ESRD (end stage renal disease)
Elevated hemidiaphragm
Polio
ESRD (end stage renal disease)
Pneumonia
ESRD (end stage renal disease)
Elevated hemidiaphragm
ESRD (end stage renal disease)
ESRD (end stage renal disease)
Elevated hemidiaphragm
ESRD (end stage renal disease)
ESRD (end stage renal disease)
Elevated hemidiaphragm
Pneumonia
Pneumonia
ESRD (end stage renal disease)
ESRD (end stage renal disease)
Elevated hemidiaphragm
Pneumonia
Pneumonia
ESRD (end stage renal disease)
Elevated hemidiaphragm
Pneumonia
Pneumonia
ESRD (end stage renal disease)
Elevated hemidiaphragm
Pneumonia
Pneumonia
ESRD (end stage renal disease)
ESRD (end stage renal disease)
Pneumonia
ESRD (end stage renal disease)
Elevated hemidiaphragm
Elevated hemidiaphragm
Pneumonia
Acute pain
ESRD (end stage renal disease)
Elevated hemidiaphragm
Elevated hemidiaphragm
Pneumonia
Pneumonia
ESRD (end stage renal disease)
Elevated hemidiaphragm
ESRD (end stage renal disease)
Elevated hemidiaphragm
Elevated hemidiaphragm
Pneumonia
ESRD (end stage renal disease)
Elevated hemidiaphragm
Pneumonia
ESRD (end stage renal disease)
Elevated hemidiaphragm
Elevated hemidiaphragm
Pneumonia
ESRD (end stage renal disease)
Elevated hemidiaphragm
Chronic generalized pain
Elevated hemidiaphragm
Pneumonia
Pneumonia

## 2025-04-17 NOTE — PROGRESS NOTE ADULT - TIME BILLING
Advanced care planning was discussed with patient and family.  Advanced care planning forms were reviewed and discussed as appropriate.  Differential diagnosis and plan of care discussed with patient after the evaluation.   Pain assessed and judicious use of narcotics when appropriate was discussed.  Importance of Fall prevention discussed.  Counseling on Smoking and Alcohol cessation was offered when appropriate.  Counseling on Diet, exercise, and medication compliance was done.
Advanced care planning was discussed with patient and family.  Advanced care planning forms were reviewed and discussed as appropriate.  Differential diagnosis and plan of care discussed with patient after the evaluation.   Pain assessed and judicious use of narcotics when appropriate was discussed.  Importance of Fall prevention discussed.  Counseling on Smoking and Alcohol cessation was offered when appropriate.  Counseling on Diet, exercise, and medication compliance was done.
Total Time Spent 70 minutes.    This includes chart review, patient assessment, discussion and collaboration with interdisciplinary team members, excluding ACP.    COUNSELING:  Face to face meeting to discuss Advanced Care Planning- Time Spent 30 minutes
Advanced care planning was discussed with patient and family.  Advanced care planning forms were reviewed and discussed as appropriate.  Differential diagnosis and plan of care discussed with patient after the evaluation.   Pain assessed and judicious use of narcotics when appropriate was discussed.  Importance of Fall prevention discussed.  Counseling on Smoking and Alcohol cessation was offered when appropriate.  Counseling on Diet, exercise, and medication compliance was done.
Total Time Spent 50 minutes.    This includes chart review, patient assessment, discussion and collaboration with interdisciplinary team members, excluding ACP.
Advanced care planning was discussed with patient and family.  Advanced care planning forms were reviewed and discussed as appropriate.  Differential diagnosis and plan of care discussed with patient after the evaluation.   Pain assessed and judicious use of narcotics when appropriate was discussed.  Importance of Fall prevention discussed.  Counseling on Smoking and Alcohol cessation was offered when appropriate.  Counseling on Diet, exercise, and medication compliance was done.
Total Time Spent 65 minutes.    This includes chart review, patient assessment, discussion and collaboration with interdisciplinary team members, excluding ACP.    COUNSELING:  Face to face meeting to discuss Advanced Care Planning- Time Spent 35 minutes
Total Time Spent 90 minutes.    This includes chart review, patient assessment, discussion and collaboration with interdisciplinary team members, excluding ACP.

## 2025-04-17 NOTE — PROGRESS NOTE ADULT - SUBJECTIVE AND OBJECTIVE BOX
Date of Service: 04-17-25 @ 12:58    Patient is a 75y old  Male who presents with a chief complaint of ESRD requiring HD, uremia (17 Apr 2025 09:35)      Any change in ROS: pt is doing very poorly:   on 100% NRB:  family at bedside:  pt actively dying  : gasping      MEDICATIONS  (STANDING):  bacitracin/polymyxin B Ointment 1 Application(s) Topical two times a day  chlorhexidine 2% Cloths 1 Application(s) Topical daily  Dakins Solution - 1/4 Strength 1 Application(s) Topical every 8 hours  lactobacillus acidophilus 1 Tablet(s) Oral two times a day with meals  mupirocin 2% Ointment 1 Application(s) Topical <User Schedule>  pantoprazole    Tablet 40 milliGRAM(s) Oral before breakfast  polyethylene glycol 3350 17 Gram(s) Oral daily  povidone iodine 10% Solution 1 Application(s) Topical two times a day  senna 2 Tablet(s) Oral at bedtime    MEDICATIONS  (PRN):  benzocaine/menthol Lozenge 1 Lozenge Oral three times a day PRN Sore Throat  bisacodyl 5 milliGRAM(s) Oral every 12 hours PRN Constipation  glycopyrrolate Injectable 0.4 milliGRAM(s) IV Push every 6 hours PRN excessive oral secretions  HYDROmorphone  Injectable 0.5 milliGRAM(s) IV Push every 2 hours PRN Moderate Pain (4 - 6)  HYDROmorphone  Injectable 1 milliGRAM(s) IV Push every 2 hours PRN Pain  HYDROmorphone  Injectable 1 milliGRAM(s) IV Push every 2 hours PRN respiratory distress  LORazepam   Injectable 0.5 milliGRAM(s) IV Push every 4 hours PRN Agitation  sodium chloride 0.9% lock flush 10 milliLiter(s) IV Push every 1 hour PRN Pre/post blood products, medications, blood draw, and to maintain line patency    Vital Signs Last 24 Hrs  T(C): 36.3 (17 Apr 2025 00:21), Max: 37 (16 Apr 2025 20:01)  T(F): 97.3 (17 Apr 2025 00:21), Max: 98.6 (16 Apr 2025 20:01)  HR: 110 (17 Apr 2025 08:22) (61 - 110)  BP: 91/40 (17 Apr 2025 08:22) (71/55 - 130/89)  BP(mean): --  RR: 18 (17 Apr 2025 00:21) (18 - 18)  SpO2: 100% (17 Apr 2025 01:37) (91% - 100%)    Parameters below as of 17 Apr 2025 01:37  Patient On (Oxygen Delivery Method): non rebreather        I&O's Summary    16 Apr 2025 07:01  -  17 Apr 2025 07:00  --------------------------------------------------------  IN: 920 mL / OUT: 340 mL / NET: 580 mL    17 Apr 2025 07:01  -  17 Apr 2025 12:58  --------------------------------------------------------  IN: 120 mL / OUT: 0 mL / NET: 120 mL          Physical Exam:   GENERAL: NAD, well-groomed, well-developed  HEENT: VINNY/   Atraumatic, Normocephalic  ENMT: No tonsillar erythema, exudates, or enlargement; Moist mucous membranes, Good dentition, No lesions  NECK: Supple, No JVD, Normal thyroid  CHEST/LUNG: poor resp excursion   CVS: Regular rate and rhythm; No murmurs, rubs, or gallops  GI: : Soft, Nontender, Nondistended; Bowel sounds present  NERVOUS SYSTEM: unresponsive on 100% NRB   EXTREMITIES: gangrenous digits   LYMPH: No lymphadenopathy noted  SKIN: No rashes or lesions  ENDOCRINOLOGY: No Thyromegaly  PSYCH: unresponsive     Labs:                              10.5   7.83  )-----------( 247      ( 16 Apr 2025 13:42 )             33.9                         11.5   16.69 )-----------( 278      ( 15 Apr 2025 10:37 )             38.4     04-16    135  |  99  |  30[H]  ----------------------------<  113[H]  4.9   |  22  |  3.99[H]  04-15    133[L]  |  97  |  22  ----------------------------<  153[H]  5.1   |  22  |  3.36[H]    Ca    7.6[L]      16 Apr 2025 13:42  Phos  4.7     04-16      CAPILLARY BLOOD GLUCOSE              Urinalysis Basic - ( 16 Apr 2025 13:42 )    Color: x / Appearance: x / SG: x / pH: x  Gluc: 113 mg/dL / Ketone: x  / Bili: x / Urobili: x   Blood: x / Protein: x / Nitrite: x   Leuk Esterase: x / RBC: x / WBC x   Sq Epi: x / Non Sq Epi: x / Bacteria: x            RECENT CULTURES:  04-15 @ 17:48 Blood Blood-Peripheral       < from: CT Chest No Cont (04.15.25 @ 23:03) >    CLINICAL INDICATION: Dyspnea.    TECHNIQUE: Noncontrast CT of the chest was obtained.    COMPARISON: 3.25.25, 2.23.25.    FINDINGS:    AIRWAYS AND LUNGS: Tracheobronchial secretions.  Patchy and clustered   bilateral lung opacities. Partial atelectasis both lower lobes.    MEDIASTINUM AND PLEURA: There are no enlarged mediastinal, hilar or   axillary lymph nodes. The visualized portion of the thyroid gland is   unremarkable. There are small bilateral pleural effusions.  There is no   pneumothorax.    HEART AND VESSELS: The heart is normal in size.  There are   atherosclerotic calcifications of theaorta and coronary arteries.  There   is no pericardial effusion.  Aortic valve calcification. Right-sided   central venous catheter with tip in SVC    UPPER ABDOMEN: Images of the upper abdomen demonstrate no abnormality.    BONES AND SOFT TISSUES: There are mild degenerative changes of the spine.    The soft tissues are unremarkable.    IMPRESSION:  Tracheobronchial secretions with multifocal impaction and likely   superimposed infectious/pulmonary process.    --- End of Report ---            CATHLEEN RIVAS MD; Attending Radiologist  This document has been electronically signed. Apr 16 2025  7:09AM    < end of copied text >           No growth at 24 hours          RESPIRATORY CULTURES:          Studies  Chest X-RAY  CT SCAN Chest   Venous Dopplers: LE:   CT Abdomen  Others

## 2025-04-17 NOTE — PROGRESS NOTE ADULT - SUBJECTIVE AND OBJECTIVE BOX
Neurology Progress Note    SUBJECTIVE/OBJECTIVE/INTERVAL EVENTS:   family opted for comfort directed care. DNR/I family at bedside. wife and daughter        Medications: MEDICATIONS  (STANDING):  bacitracin/polymyxin B Ointment 1 Application(s) Topical two times a day  chlorhexidine 2% Cloths 1 Application(s) Topical daily  Dakins Solution - 1/4 Strength 1 Application(s) Topical every 8 hours  lactobacillus acidophilus 1 Tablet(s) Oral two times a day with meals  mupirocin 2% Ointment 1 Application(s) Topical <User Schedule>  pantoprazole    Tablet 40 milliGRAM(s) Oral before breakfast  polyethylene glycol 3350 17 Gram(s) Oral daily  povidone iodine 10% Solution 1 Application(s) Topical two times a day  senna 2 Tablet(s) Oral at bedtime    MEDICATIONS  (PRN):  benzocaine/menthol Lozenge 1 Lozenge Oral three times a day PRN Sore Throat  bisacodyl 5 milliGRAM(s) Oral every 12 hours PRN Constipation  glycopyrrolate Injectable 0.4 milliGRAM(s) IV Push every 6 hours PRN excessive oral secretions  HYDROmorphone  Injectable 0.5 milliGRAM(s) IV Push every 2 hours PRN Moderate Pain (4 - 6)  HYDROmorphone  Injectable 1 milliGRAM(s) IV Push every 2 hours PRN Pain  HYDROmorphone  Injectable 1 milliGRAM(s) IV Push every 2 hours PRN respiratory distress  LORazepam   Injectable 0.5 milliGRAM(s) IV Push every 4 hours PRN Agitation  sodium chloride 0.9% lock flush 10 milliLiter(s) IV Push every 1 hour PRN Pre/post blood products, medications, blood draw, and to maintain line patency       Vitals:  Vital Signs Last 24 Hrs  T(C): 36.3 (17 Apr 2025 00:21), Max: 37 (16 Apr 2025 20:01)  T(F): 97.3 (17 Apr 2025 00:21), Max: 98.6 (16 Apr 2025 20:01)  HR: 110 (17 Apr 2025 08:22) (106 - 110)  BP: 91/40 (17 Apr 2025 08:22) (71/55 - 128/74)  BP(mean): --  RR: 18 (17 Apr 2025 00:21) (18 - 18)  SpO2: 100% (17 Apr 2025 01:37) (91% - 100%)    Parameters below as of 17 Apr 2025 01:37  Patient On (Oxygen Delivery Method): non rebreather             Patient On (Oxygen Delivery Method): room air    Physical Exam:   MS -  opens eyes to noxious. not really talking or following   CN - Pupils constricted b/l but equal (possibly from opioids), EOMI, V1-V3 symmetric, smile symmetric, no tongue deviation  Motor - b/l UE antigravity, b/l LE no movement noted (baseline)  Sensory - SILT b/l  coord and gait unable   l       LABS:                          10.5   7.83  )-----------( 247      ( 16 Apr 2025 13:42 )             33.9     04-16    135  |  99  |  30[H]  ----------------------------<  113[H]  4.9   |  22  |  3.99[H]    Ca    7.6[L]      16 Apr 2025 13:42  Phos  4.7     04-16          Urinalysis Basic - ( 16 Apr 2025 13:42 )    Color: x / Appearance: x / SG: x / pH: x  Gluc: 113 mg/dL / Ketone: x  / Bili: x / Urobili: x   Blood: x / Protein: x / Nitrite: x   Leuk Esterase: x / RBC: x / WBC x   Sq Epi: x / Non Sq Epi: x / Bacteria: x

## 2025-04-17 NOTE — PROGRESS NOTE ADULT - PROBLEM SELECTOR PLAN 7
HCP: daughter Cori  Code status: DNR/I     I spoke with Cori this morning and informed me that the patient and family opted for comfort-focused care last night, including discontinuation of dialysis. We discussed life expectancy, which could range from days to a few weeks without dialysis. We also discussed comfort measures in detail, offered transfer to the palliative care unit for symptom management. She agreed with plan     Please see above GOC note he was diagnosed with polio at the age of 18 months old,  associated neurogenic bladder/suprapubic catheter and other several complications.

## 2025-04-17 NOTE — PROGRESS NOTE ADULT - PROBLEM SELECTOR PLAN 5
multiple wounds: largest sacral wound s/p debridement on 4/8  - pain management following   - Advise to premedicate with IV Dilaudid 15 min prior of wound clean   - ID recommending to stop antibiotics - IV Glycopyrrolate 0.4mg q6hrs PRN   -  Turn and position for postural drainage.

## 2025-04-17 NOTE — PROGRESS NOTE ADULT - PROBLEM SELECTOR PLAN 9
Plan to transfer to PCU when a bed become available, discussed with primary team     Recommendations:  - Comfort measures: vitals q daily, no blood draws, no rapids, no MEWs, no code strokes, only palliative medications as per discussion with family  - Above symptoms management regimen   - Chaplaincy following  - Holistic RN following

## 2025-04-17 NOTE — PROGRESS NOTE ADULT - PROBLEM SELECTOR PROBLEM 8
RN did speak with lab staff.  They stated it generally takes about 48 hours but can take longer. Should not take 5 days like [facility].      RN did call and leave a message for patient. To call clinic back .    Encounter for palliative care ACP (advance care planning)

## 2025-04-17 NOTE — PROGRESS NOTE ADULT - TIME-BASED BILLING (NON-CRITICAL CARE)
Time-based billing (NON-critical care)

## 2025-04-17 NOTE — PROGRESS NOTE ADULT - ASSESSMENT
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 He is well known to me from multiple recent admisions  s/p admissions at [facility] 12/20-12/25/24 and 2/4-2/7 with SONDRA on CKD with associated uremic symptoms.   At the last admission he had expressed strong interest to try to hold off with HD intiation but he has been getting worse clinically at home.  His SONDRA and uremic symptoms significantly improved after the first admission; they improved a to mild extent during the 2nd admission to the point where he was able to be discharged without HD. Since then, however, he has worsened further.   He has been suffering from progressively worsening diffuse pruritus, loss of appetite, and generalized weakness and falls.   His nephrologist and PCP has been speaking with him and his family over the past few days,   The initial plan was that he would present to the [facility] ER Monday 2/17 (ie tomorrow) for HD initiation. Today, however, he fell and sustained trauma to his knee. Given the fall and concern for knee fracture, he presented to the ER today. multiple xrays show no Fractures.      CKD5, uremia, requiring initiation of HD  Rash, seborrheic dermatitis  Pruritis  Anemia  PAD  SP catheter, chronic  Left inferior pole acute on chronic patella Fx    plan  - HD via R subclavian permacath  -4/17: ptn is taking agonal breaths, not responding to verbal stimuli, om comfort care, taken off HD, family at bedside, crying. support provided. cont Robinul , dilaudid, ativan, prognosis is grave. will keep him on medical floor, this was d/w palliative attending.   -4/16: ptn appears better today, myoclonic jerks resolved off Gabapentin. Dr. Brown from nephrology recommended to stop HD as ptn is not improving and getting worse clinically. family to decide. if HD will be stopped, he would be a candidate for inptn hospice for comfort care. this was d.w Palliatiive care attending as well. the daughter, Yanique, who is HCP asked not to inform the ptn nor his wife of these developments. head ct from last night:  no acute findings. CT chest: b/l mult mucoid impactions, cannot r/o multifocal PNA. cont IV Dapto and Meropenem.   -4/15: ptn is altered, has myoclonic jerks, he states he is in severe pain, ptn wofe, daughter palliaitive care and wound care had a 45 min discussion about prognosis and GOC. the daughter asked wound care if the ptn can be placed in an induced coma in order to do proper wound care and pain control. wound care team explained that is not a viable option. ptn is eating, but is not optimized nutritionally for better wound healing. he is also a high risk of complications from TPN. cont HD as per renal, ptn is on Midodrine for hypotension. wbc pheobe today, will do blood cx x 2. ABx as per ID. Neuro following. will get Head and chest CT  -4/14: ptn is awaiting to go to HD, extensive conversation w daughter yanique about GOC, ptn has new sacral wounds despite getting turned, the present sacaral wound are healing, getting daily dressing changes. daughter wants to talk to palliaitive and wound care re prognosis and GOC meeting. wound care attending made aware to call the daughter, spoke ramses Fontenot ACP to place Palliative re-eval re GOC. spoje to yanique x 45 min re hospital course and she expressed a possibility of stopping HD and proceed w hospice.   -4/13: ptn is in good spirits, he was transferred 2/2 requiring an isolation room 2/2 growing VRE, CR E.coli, in addition to P.aeruginosa in sacral wound. wound dressing changed last night and this am, pain is controlled. wife at bedside. i reiterated to the ptn and the wife that refusing dressing changes will put him back and the wound will get worse. the ptn understands and stated he will not refuse dressing changes in the future.   -4/12: ongoing severe pain at sacral decubiti, wound care done by nursing as per wound care and surgery recs. ABx changed to Daptomycin and Cefepime. Meropenem was DCed  - 4/11:  ptn is awake, alert, has pain in LE, sacral pain is controlled. after a conversation about pain management and his history re debility starting in youth, ptn asked me to pay in the bed w him. i felt uncomfortable and excused myself. wife at the bedside. ptn is AxOx4. i told him the comment was not appropriate and the ptn laughed it off and asked why. as far as wound care, ptn has been refusing to have dressing changed post op. he has fecal incontinence.   -4/10:  pain is controlled, on Hemal for P. aeruginosa growing in sacral decub debridement Cx.ptn is DNR/DNI, was seen by palliaitive care. being followed by pain for pain management in LE and 2/2 sacral decubiti.  awaiting dressing change by surgical team.   -4/9: s/p debridement in OR by general surgery. pain consult and palliative consult reviewed. pain regimen reviewed w pain team.  they d/w ptn and daughter. ptn refused for the dressing to be changed by surgical team today. they tried several times. wound care post op is very important in healing process. meropenem restarted  -4/8: ptn is awaiting debridement in the OR today, debulking and wound care at the bedside has been challenging 2/2 pain not allowing 2/2 pain and fear of pain  - 4/7: ptn complaining his pain meds are not covering his pain completely. yesterday he was talking about death a lot and how he wished to die. had psych eval today. today he is doing better emotionally, not talking about drath and wants to cont treatment. he is tolerating HD, he finished ABx for PNA last week. his wounds are the primary reason of admission at this point.  I also called palliaitive consult.   He was seen by surgery for debridement of sacral decub, ptn is refusing diverting colostomy. on admission ptn had intact skin but did show primary stages of pressure injury in the sacral region. ptn didnt allow us to turn him 2/2 pain in LLE for several days, despite our concern of further pressure injury on the sacrum. once he was turned we discovered skin break and started wound care. again he would not allow us to turn him. he said the wound is ok, its scabbed, Dee Dee( his wife) is handling it. I called wound care to see him, wound care discovered that the area had necrosis, not scabs, debulking at the bedside was performed, ptn complained it was extremely painful. ptn was seen by wound care attending and recommended debridement in the OR under anesthesia. it is planned for 4/9. it was also recommended he should have a diverting colostomy. ptn is refusing it. HCP his daughter Yanique aware.   -4/6: spoke w ptn's daughter Yanique on the phone today x 20 min, spoke to ptn , his wife and daughter yanique at the bedside x 60 min today. ptn is agreeing to debridement in the OR, adamantly refusing diverting colostomy he would consider it if post debridement he would not improve and would require another debridement. ptn was seen by urology and ID bc daughter complained increased amount of "pus" at the SPT, both ID and urology feel there is no acute infection/cellulitis, the drainage at the site and expected and not unusual. daughter and wife state ptn didnt have sacral wound when he came. on admission with the aide of his wife we flipped him and he had 1st degree pressure decubiti sacrally, skin was intact.   - 4/5: thorough eval by wound care attending 4/4/25, case d/w wound care attending Dr Dugan. as per her recommendation placed general surgery consult for OR debridement and diverting colostomy. ptn had sacral decubiti prior to admission  -4/4: dr dugan from wound care had an extensive conversation w the ptn and daughter yanique on the phone at bedside. ptn needs general surgery consult for OR debridement of necrotic tissue of the sacral decubiti and diverting colostomy. ptn did not tolerate debridement at the bedside. . completed Abx for PNA  -4/3: ptn's insurance changed to straight medicare/medicaid. now able to go to [facility], but ptn wants to go home. will need equipment set up. this was d/w CM and ACP, daughter Yanique on the phone and wife at bedside today. today is last day of ABx. will transition pain meds to po DIlaudid from IV, will also need outptn pain management F/U, outptn wound care, home PT, need outptn HD set up  -4/2: seen prior to going to HD, no new events. tomorrow completing 10 day course of Meropenem. ongoing need for narcotics 2/2 pain.   -4/1: ptn is frustrated about a prolonged hospitalization but states he feels better overall and once medically cleared he will be ready to go home, not sooner than 4/7. completing ABx on 4/3.   -3/31: at HD today had 1.7 L UF, post HD was hypotense, gave gentle IVF , BP still a bit low. afebrile, loose BMs but not diarrhea, GI PCR not sent, RVP neg. . on Meropenem course for total of 10 days, last day 4/3, would care for decubiti and left knee wound post trauma  -3/30: Tmax 100.9, c/o dry cough and diarrhea but not watery. pain is controlled at the time of visit. wife at bedside. will check RVP panel, GI PCR, sine diarrhea non watery will not order C. Diff. ID to follow  - 3/29: cont meropenem, seen by coverage  - 3/28: pain from decubitus ulcer and Left knee post debridement continues. wound care recs in place. on Meropenem for PNA, pulm and ID following. HD as per renal. HDS  -3/27: ptn is complaining that pain post decubital and left knee debridement has been severe. his daughter is on speaker phone, wife is at bedside. his pain is controlled when meds are administered. he is very uncomfortable due to the pain at the site of decubitus debridement. lyrica helped his pain before but made him drowsy, griffin with gabapentin. he doesnt want them to be resumed. will cont dilaudid, oxycodone, oxycontin, ibuprofen. he is on Meropenem for aspiration PNA/HCAP. [facility] for DVT ppx. seen by vascular cardiology to address R IJ post shiley non occlusive DVT. full AC was not recommended. will check rpt doppler in 3-4 weeks.   - 3/26: Wound care performed bedside debridement of Left knee wound 2/2 lifted eschar, and sacral decubitus. findings c/w possible infection and mainly fat necrosis. cont Meropenem. ID following. podiatry following for gangrenous toes. will give additional single dose IV DIlaudid 2/2 severe pain post debridement.    - 3/25: wife at bedside, daughter on speaker phone at bedside. ptn is pain free at the time of visit and states he wants to cont the pain regiment he is presently on. ct C/A/P revealed: RLL PNA, prob aspiration, stercoral proctitis, decubitus ulcer w possible progression to sacral OM, R IJ nonocclusive DVT. these findings d/w ptn ,his family, wound care, ACP, Vascular, Nephrology, ID, pUlm, GI. meropenem initiated, Bactrim stopped. ptn states when he is cleared for DC, he wants to go home , not to [facility], not to SNF. daughter and wife aware.   -3/24: ptn  was seen by GI for N/V, its not clear the etiology, will obtain Ct C/A/P. ptn states he is coughing up green mucus as well  -3/23:  ptn is in good spirits, says he vomited "spit" this am, but tolerated all meals without vomiting. no abd pain, no undigested food in the vomit. afebrile, no diarrhea, RVP neg. GI consulted.   -3/21-22: ptn feels well.  pain is controlled. still no accepting facility for [facility]  -3/20: ptn states he has severe low back and LLE pain though overall is improving.   -3/19: ptn w tan crusting around SPC , states its painful. started on po Bactrim, renally dosed by ID for cellulitis.   -3/18: LLE paraesthesias are chronic, will d/w CM re dc planning to [facility]    -3/17: ptn states he is lethargic, he has LLE numbness which is new and severe pain at SPC insertion site. this was ID, d/w neuro, renal and vascular. as per ID: no sign of an acute infection recommend CT A/P.   -3/16:  urine cx from 3/15 NTD, zosyn DCed, awaiting dc to [facility], not sure will be able to go to [facility] since there is an insurance coverage conflict. HD as per renal  - 3/15: spoke w ptn's daughter today re denial from [employer] for [facility]. ptn has [employer] medicare advantage plan, which [facility] doesnt accept. i recommended to speak  to  and to the insurance company to find participating facilities. ptn is stable today. pain and erythema at SP catheter site has resolved today. seen by ID, will cont ZOSYN for now. UCx sent.   - 3/14: suprapubic tube changhed by , ptn has crusting at the insertion site and pain. will start Abx, urine always has sediment, will sent for cx, start Vanco/ZOsyn. ID consult called. check MRSA/MSSA PCR  -3/13: ptn is doing well. ptn has an accepting rehab facility, now pending AUTH.   -3/12: ptn is no longer constipated. doing well off prn IV DIlaudid. awaiting dc to [facility]  -3/11: ptn has no new c/o other than feeling constipated, lactulose ordered. also in preparation for [facility] will dc prn IV DIlaudid, cont prn OXy IR  -3/10:  ptn is awake, alert, wife at bedside, i instructed them to give rehab choices. also awaiting SPC change to be done by urology. pain is controlled  -3/9: seen by PT, will refer to [facility]. choices to be given in AM. this was d/w ptn, daughter, wife.   -3/8:  ptn didnt want to get PT eval done, will reorder. ptn needs [facility] placement. PMNR consult ordered  - 3/7: ptn is alert , pain is controlled, DC planning d/w ptn and HCP, daughter Yanique  -3/6:  ptn is awake, alert, ecchymotic feet look improved, pain is controlled. DC planning to [facility]  3/4-5 - s/p b/l iliac arteries angioplasty and stent placements on 3/3. today has new ecchymoses in b/l LE, concern for arterial insufficiency. vascular aware.. wound from Left knee immobilizer is dressed and healing. pain is controlled.   LEFT UE AVF placement/scheduling will be on outptn basis as per vascular. dc planning to [facility]  - 3/3: ptn is s/p angiogram RLE : b/l iliac arteries w successful angioplasty and stents. in PACU. awaitng HD.  ptn has a pressure wound from the LLE immobilizer posteriorly proximal to the knee. its been on 2/2 knee fracture, applied by ortho and managed by ptn's wife as per ptn's and wife's request , this was d/w nursing and nurse manager ms Ruiz.. immobilizer should be managed by orthopedics and nursing. will keep it off, only keep on when repositioning for care and then remove. wound care to F/U . this was discussed at length w daughter Yanique and wife Dee Dee.   -3/1-3/2: ptn is smiling, pain free, had HD 2/28 and 3/1, awaiting RLE angiogram 3/3, on Heparin drip, euvolemic  -2/28: ptn is lethargic, awaiting RLE angiogram possible fem-fem bypass hopefully on 3/3 pending OR availability, awaiting HD today    -2/27:  plan for angiogram in am with possible angioplasty, possible stent, possible fem-fem bypass. medically cleared for angiogram and possible surgery, stent, angioplasty. pain is controlled. remains on heparin drip as per vascular. HD as per renal    -2/26:  ptn is sleeping, pain is controlled, he was seen by wound care and vascular attending. planning on angiogram 2/2 severe PAD in LE on CTA. he also spoke to HCP. ptn and HCP in agreement. ptn was started on HEPARIN drip as per vascular recs. RIJ permacath done 2/25    - 2/25: ptn states he is hallucinating, but not at present, his MS is at baseline, his pain is controlled, he still needs prn pain meds when he is moved in the bed or for testing or for HD. awaiting Permacath, AVF creation, LE bypass to be d/w Dr. Swenson and the ptn tomorrow. ptn has cardiology clearance  if he opts for. Ptn has sacral decubiti and posterior thigh and buttock decibiti and b/l heel decubiti. Z flow fabienne d/w RN, get wound care consult    -2/24: pain is controlled  if the LLE is immobile and fully extended. doesn't tolerate the knee immobilizer. has a medium condyle and acute on chronic patella fx in LEFT knee. as per vascular ptn will need iliac stent  w a fem-fem bypass, possible axillo-femoral bypass. for this surgery he would need cardiac work up . more pressing surgery is LUE AVF creation,  in IR will arrange for Permacath. for pain control: Motrin 400 mg q12H, Oxy ER 30 mg q12H, Oxy IR 10 for mod pain and dilaudid 2 mg iv for severe pain. still has pruritis though improved, will raise atarax 25 mg to tid. plan of care d/w daughter Norma Persaud , ptn and his wife. Also d/w renal, card, vascular, ACP    -2/23: Daughter Yanique is the HCP, she and the ptn filled out the paperwork. daily plan and findings d/w her and the ptn. MS is at abseline, c/o b/l LE foot pain and Left Knee pain. xrays of left knee: cannot r/o acute on chronic inferior pole patellar Fx. knee immobilizer is on, awaiting ortho consult. doubt needs any intervention awaiting Ct chest today, will add CT Left knee. ptn states itching has recurred, severe pain has recurred. will resume Atatrax ( 25 mg bid) and Oxycodone ER , but at a lower dose 15 mg q12H. cont prn analgesics. HD as per renal, awaiting Vascular consult f/u re CTA A/L &LE and recs. ptn also wants AVF surgery on this admission. Coughing has stopped    - 2/22: ptn is drowsy but arousable, calmer today, answers questions appropriately. he is pain free, he stopped coughing, he denies having pruritis: will DC:  OXY ER, Atarax, Tessalon perles.     -  2/21: ptn is tearful, a bit confused, coughing, recognizes me and family at bedside. GOC d/w daughter and wife. AMS prob delirium 2/2 acute illness +/- opiods, lyrica, atarac. will lower atarak to tid, dc lyrica, cont Oxy 2/2 ptn has severe LE pain. neuro called for eval. Head ct done yesterday w no acute findings. CTA A/P w LE run fof: severe PAD, vascular to follow up on plan fo care. plan for HD tomorrow and next week place on tiw schedule of MWF. will need tunneled catheter prior to DC and will d/w vascular scheduling of AVF. ptn wants all to be done while inptn. daughter wants to be HCP as per ptn's wishes. she was given paperwork to get it signed and witnessed and will present to nursing thereafter. details of findings and complaints and plan of care d/w daughter and wife. spent 60 min. RVP ordered. start mucinex , tessalon perles, duonebs, get CT chest to r/o PNA. pulm called    -  2/20:  ptn had RRT 2/2 AMS and tremors. no SZ, no LOC. noted to have Na 123( hyponatremia), given 500 cc NS. Gabapentin DCed. ptn had been taking gabapentin at home PTA. Pain is controlled. Pruritis resolved, tolerating HD otherwise.     - Pain management:  cont Oxycodone 10 IR q6H,  DIlaudid prn severe pain 2 mg q4H prn.   - cont outptn meds  - DVT ppx w HSC

## 2025-04-17 NOTE — PROGRESS NOTE ADULT - SUBJECTIVE AND OBJECTIVE BOX
[facility]  SABINO Griffin Y. Patel, S. Shah, G. Casimir  [phone]  ([phone] - weekdays after 5pm and weekends)    Name: BRIAN DEMPSEY  Age/Gender: 75y Male  MRN: [id number]    Interval History:  Patient seen and examined this morning.   Resting comfortably after being changed this am.  Notes reviewed. Afebrile   Allergies: gabapentin (Other)      Objective:  Vitals:   T(F): 97.3 (04-17-25 @ 00:21), Max: 98.6 (04-16-25 @ 20:01)  HR: 110 (04-17-25 @ 08:22) (61 - 110)  BP: 91/40 (04-17-25 @ 08:22) (71/55 - 130/89)  RR: 18 (04-17-25 @ 00:21) (18 - 18)  SpO2: 100% (04-17-25 @ 01:37) (91% - 100%)  Physical Examination:  General: no acute distress  HEENT: normocephalic, atraumatic  Respiratory: no acc muscle use, breathing comfortably  Cardiovascular: S1 and S2 present  Gastrointestinal: nondistended, SPC  Extremities: b/l lower extremity dressings     Laboratory Studies:  CBC:                       10.5   7.83  )-----------( 247      ( 16 Apr 2025 13:42 )             33.9     WBC Trend:  7.83 04-16-25 @ 13:42  16.69 04-15-25 @ 10:37  9.32 04-12-25 @ 10:10  11.15 04-11-25 @ 09:03  8.84 04-10-25 @ 09:30    CMP: 04-16    135  |  99  |  30[H]  ----------------------------<  113[H]  4.9   |  22  |  3.99[H]    Ca    7.6[L]      16 Apr 2025 13:42  Phos  4.7     04-16  Mg     2.0     04-15    Creatinine: 3.99 mg/dL (04-16-25 @ 13:42)  Creatinine: 3.36 mg/dL (04-15-25 @ 10:37)  Creatinine: 2.74 mg/dL (04-12-25 @ 10:10)  Creatinine: 3.43 mg/dL (04-11-25 @ 09:03)  Creatinine: 2.92 mg/dL (04-10-25 @ 09:30)    Microbiology: reviewed   Culture - Blood (collected 04-15-25 @ 17:48)  Source: Blood Blood-Peripheral  Preliminary Report (04-16-25 @ 23:09):    No growth at 24 hours    Culture - Blood (collected 04-15-25 @ 17:48)  Source: Blood Blood-Peripheral  Preliminary Report (04-16-25 @ 23:09):    No growth at 24 hours    Culture - Fungal, Other (collected 04-08-25 @ 20:58)  Source: Other Sacral Wound Swab  Preliminary Report (04-16-25 @ 23:02):    No fungus isolated at 1 week.    Culture - Acid Fast - Other w/Smear (collected 04-08-25 @ 20:58)  Source: Other Sacral Wound Swab  Preliminary Report (04-16-25 @ 23:07):    No acid-fast bacilli isolated at 1 week. ****Acid-fast cultures are held    for 6 weeks.****    Culture - Wound Aerobic (collected 04-08-25 @ 20:58)  Source: Other Sacral Wound Swab  Final Report (04-12-25 @ 01:17):    Few Pseudomonas aeruginosa    Rare Escherichia coli (Carbapenem Resistant)    Numerous Enterococcus faecium (vancomycin resistant)  Organism: Pseudomonas aeruginosa  Escherichia coli (Carbapenem Resistant)  Enterococcus faecium (vancomycin resistant) (04-12-25 @ 01:17)  Organism: Escherichia coli (Carbapenem Resistant) (04-12-25 @ 01:17)      -  Levofloxacin: R >4      -  Tobramycin: S <=2      -  Ceftazidime/Avibactam: S <=4      -  Aztreonam: R >16      -  Gentamicin: S <=2      -  Cefazolin: R >16      -  Cefepime: SDD 4 The breakpoint for susceptible dose dependent may require the usage of a higher cefepime dose (equivalent to adult dose of 2g q8h for normal renal function) for successful treatment.      -  Piperacillin/Tazobactam: R 32      -  Ciprofloxacin: R >2      -  Imipenem: I 2      -  Ceftriaxone: R 32      -  Ampicillin: R >16 These ampicillin results predict results for amoxicillin      Method Type: PRIMITIVO      -  Meropenem: S <=1      -  Ampicillin/Sulbactam: R >16/8      -  Amoxicillin/Clavulanic Acid: R >16/8      -  Trimethoprim/Sulfamethoxazole: S 1/19      -  Ceftolozane/tazobactam: I 4      -  Ertapenem: I 1  Organism: Escherichia coli (Carbapenem Resistant) (04-12-25 @ 01:17)      -  Resistance Gene to Carbapenem: Detec      Method Type: CarbaR      -  Resistance Gene KPC: Detec  Organism: Enterococcus faecium (vancomycin resistant) (04-12-25 @ 01:17)      -  Daptomycin: SDD 2 The breakpoint for SDD (susceptible dose dependent)is based on a dosage regimen of 8-12 mg/kg administered every 24 h in adults and is intended for serious infections due to E. faecium. Consultation with an infectious diseases specialist is recommended.      -  Linezolid: S <=1      -  Vancomycin: R >16      -  Ampicillin: R >8 Predicts results to ampicillin/sulbactam, amoxacillin-clavulanate and  piperacillin-tazobactam.      Method Type: PRIMITIVO  Organism: Pseudomonas aeruginosa (04-12-25 @ 01:17)      -  Levofloxacin: R >4      -  Aztreonam: R >16      -  Cefepime: S 8      -  Piperacillin/Tazobactam: S 16      -  Ciprofloxacin: R >2      -  Imipenem: S <=1      Method Type: PRIMITIVO      -  Meropenem: S <=1      -  Ceftazidime: S 8    Radiology: reviewed     Medications:  albuterol/ipratropium for Nebulization 3 milliLiter(s) Nebulizer every 6 hours  aspirin  chewable 81 milliGRAM(s) Oral daily  atorvastatin 40 milliGRAM(s) Oral at bedtime  bacitracin/polymyxin B Ointment 1 Application(s) Topical two times a day  benzocaine/menthol Lozenge 1 Lozenge Oral three times a day PRN  bisacodyl 5 milliGRAM(s) Oral every 12 hours PRN  chlorhexidine 2% Cloths 1 Application(s) Topical daily  clopidogrel Tablet 75 milliGRAM(s) Oral daily  Dakins Solution - 1/4 Strength 1 Application(s) Topical every 8 hours  DAPTOmycin IVPB 900 milliGRAM(s) IV Intermittent every 48 hours  heparin   Injectable 5000 Unit(s) SubCutaneous every 8 hours  HYDROmorphone  Injectable 1 milliGRAM(s) IV Push every 6 hours PRN  hydrOXYzine hydrochloride 25 milliGRAM(s) Oral three times a day  ibuprofen  Tablet. 400 milliGRAM(s) Oral every 12 hours  lactobacillus acidophilus 1 Tablet(s) Oral two times a day with meals  meropenem  IVPB 500 milliGRAM(s) IV Intermittent every 24 hours  methocarbamol 750 milliGRAM(s) Oral every 8 hours  mupirocin 2% Ointment 1 Application(s) Topical <User Schedule>  Nephro-margaret 1 Tablet(s) Oral daily  oxyCODONE    IR 10 milliGRAM(s) Oral every 4 hours PRN  oxyCODONE  ER Tablet 20 milliGRAM(s) Oral every 8 hours  pantoprazole    Tablet 40 milliGRAM(s) Oral before breakfast  polyethylene glycol 3350 17 Gram(s) Oral daily  povidone iodine 10% Solution 1 Application(s) Topical two times a day  senna 2 Tablet(s) Oral at bedtime  sodium chloride 0.9% lock flush 10 milliLiter(s) IV Push every 1 hour PRN    Current Antimicrobials:  DAPTOmycin IVPB 900 milliGRAM(s) IV Intermittent every 48 hours  meropenem  IVPB 500 milliGRAM(s) IV Intermittent every 24 hours    Prior/Completed Antimicrobials:  piperacillin/tazobactam IVPB.  piperacillin/tazobactam IVPB.  piperacillin/tazobactam IVPB.-  piperacillin/tazobactam IVPB.-  trimethoprim   80 mG/sulfamethoxazole 400 mG  trimethoprim  160 mG/sulfamethoxazole 800 mG  vancomycin  IVPB  vancomycin  IVPB

## 2025-04-17 NOTE — PROGRESS NOTE ADULT - NUTRITIONAL ASSESSMENT
This patient has been assessed with a concern for Malnutrition and has been determined to have a diagnosis/diagnoses of Moderate protein-calorie malnutrition.    This patient is being managed with:   Diet NPO after Midnight-     NPO Start Date: 07-Apr-2025   NPO Start Time: 23:59  Entered: Apr 7 2025  5:38PM    Diet Regular-  1000mL Fluid Restriction ([id number])  Low Sodium  Liquid Protein Supplement     Qty per Day:  2  Supplement Feeding Modality:  Oral  Nepro Cans or Servings Per Day:  2       Frequency:  Daily  Entered: Mar 27 2025  2:01PM  
This patient has been assessed with a concern for Malnutrition and has been determined to have a diagnosis/diagnoses of Moderate protein-calorie malnutrition.    This patient is being managed with:   Diet Regular-  1000mL Fluid Restriction ([id number])  No Concentrated Potassium  Entered: Mar  7 2025  1:54PM  
This patient has been assessed with a concern for Malnutrition and has been determined to have a diagnosis/diagnoses of Moderate protein-calorie malnutrition.    This patient is being managed with:   Diet Regular-  1000mL Fluid Restriction ([id number])  Low Sodium  No Carb Prosource TF     Qty per Day:  2  Supplement Feeding Modality:  Oral  Nepro Cans or Servings Per Day:  2       Frequency:  Daily  Entered: Mar 19 2025  9:42AM  
This patient has been assessed with a concern for Malnutrition and has been determined to have a diagnosis/diagnoses of Moderate protein-calorie malnutrition.    This patient is being managed with:   Diet Regular-  1000mL Fluid Restriction ([id number])  No Concentrated Potassium  Entered: Mar  7 2025  1:54PM  
This patient has been assessed with a concern for Malnutrition and has been determined to have a diagnosis/diagnoses of Moderate protein-calorie malnutrition.    This patient is being managed with:   Diet Regular-  1000mL Fluid Restriction ([id number])  No Concentrated Potassium  Entered: Mar  7 2025  1:54PM  
This patient has been assessed with a concern for Malnutrition and has been determined to have a diagnosis/diagnoses of Moderate protein-calorie malnutrition.    This patient is being managed with:   Diet Regular-  1000mL Fluid Restriction ([id number])  Low Sodium  Liquid Protein Supplement     Qty per Day:  2  Supplement Feeding Modality:  Oral  Nepro Cans or Servings Per Day:  2       Frequency:  Daily  Entered: Mar 27 2025  2:01PM  
This patient has been assessed with a concern for Malnutrition and has been determined to have a diagnosis/diagnoses of Moderate protein-calorie malnutrition.    This patient is being managed with:   Diet Regular-  1000mL Fluid Restriction ([id number])  Low Sodium  No Carb Prosource TF     Qty per Day:  2  Supplement Feeding Modality:  Oral  Nepro Cans or Servings Per Day:  2       Frequency:  Daily  Entered: Mar 19 2025  9:42AM  
This patient has been assessed with a concern for Malnutrition and has been determined to have a diagnosis/diagnoses of Moderate protein-calorie malnutrition.    This patient is being managed with:   Diet Regular-  1000mL Fluid Restriction ([id number])  Low Sodium  No Carb Prosource TF     Qty per Day:  2  Supplement Feeding Modality:  Oral  Nepro Cans or Servings Per Day:  2       Frequency:  Daily  Entered: Mar 19 2025  9:42AM  
This patient has been assessed with a concern for Malnutrition and has been determined to have a diagnosis/diagnoses of Moderate protein-calorie malnutrition.    This patient is being managed with:   Diet Regular-  1000mL Fluid Restriction ([id number])  No Concentrated Potassium  Entered: Mar  7 2025  1:54PM  
This patient has been assessed with a concern for Malnutrition and has been determined to have a diagnosis/diagnoses of Moderate protein-calorie malnutrition.    This patient is being managed with:   Diet Regular-  1000mL Fluid Restriction ([id number])  Low Sodium  Liquid Protein Supplement     Qty per Day:  2  Supplement Feeding Modality:  Oral  Nepro Cans or Servings Per Day:  2       Frequency:  Daily  Entered: Mar 27 2025  2:01PM  
This patient has been assessed with a concern for Malnutrition and has been determined to have a diagnosis/diagnoses of Moderate protein-calorie malnutrition.    This patient is being managed with:   Diet Regular-  1000mL Fluid Restriction ([id number])  Low Sodium  Liquid Protein Supplement     Qty per Day:  2  Supplement Feeding Modality:  Oral  Nepro Cans or Servings Per Day:  2       Frequency:  Daily  Entered: Mar 27 2025  2:01PM  
This patient has been assessed with a concern for Malnutrition and has been determined to have a diagnosis/diagnoses of Moderate protein-calorie malnutrition.    This patient is being managed with:   Diet Regular-  1000mL Fluid Restriction ([id number])  Low Sodium  No Carb Prosource TF     Qty per Day:  2  Supplement Feeding Modality:  Oral  Nepro Cans or Servings Per Day:  2       Frequency:  Daily  Entered: Mar 19 2025  9:42AM  
This patient has been assessed with a concern for Malnutrition and has been determined to have a diagnosis/diagnoses of Moderate protein-calorie malnutrition.    This patient is being managed with:   Diet Regular-  1000mL Fluid Restriction ([id number])  Low Sodium  Liquid Protein Supplement     Qty per Day:  2  Supplement Feeding Modality:  Oral  Nepro Cans or Servings Per Day:  2       Frequency:  Daily  Entered: Mar 27 2025  2:01PM  
This patient has been assessed with a concern for Malnutrition and has been determined to have a diagnosis/diagnoses of Moderate protein-calorie malnutrition.    This patient is being managed with:   Diet Regular-  1000mL Fluid Restriction ([id number])  Low Sodium  No Carb Prosource TF     Qty per Day:  2  Supplement Feeding Modality:  Oral  Nepro Cans or Servings Per Day:  2       Frequency:  Daily  Entered: Mar 19 2025  9:42AM  
This patient has been assessed with a concern for Malnutrition and has been determined to have a diagnosis/diagnoses of Moderate protein-calorie malnutrition.    This patient is being managed with:   Diet Regular-  1000mL Fluid Restriction ([id number])  Low Sodium  Liquid Protein Supplement     Qty per Day:  2  Supplement Feeding Modality:  Oral  Nepro Cans or Servings Per Day:  2       Frequency:  Daily  Entered: Apr 8 2025  4:13PM  
This patient has been assessed with a concern for Malnutrition and has been determined to have a diagnosis/diagnoses of Moderate protein-calorie malnutrition.    This patient is being managed with:   Diet Regular-  1000mL Fluid Restriction ([id number])  No Concentrated Potassium  Entered: Mar  7 2025  1:54PM  
This patient has been assessed with a concern for Malnutrition and has been determined to have a diagnosis/diagnoses of Moderate protein-calorie malnutrition.    This patient is being managed with:   Diet Renal Restrictions-  For patients receiving Renal Replacement - No Protein Restr No Conc K No Conc Phos Low Sodium  1000mL Fluid Restriction ([id number])  Entered: Mar  3 2025  2:35PM  
This patient has been assessed with a concern for Malnutrition and has been determined to have a diagnosis/diagnoses of Moderate protein-calorie malnutrition.    This patient is being managed with:   Diet Regular-  1000mL Fluid Restriction ([id number])  Low Sodium  Liquid Protein Supplement     Qty per Day:  2  Supplement Feeding Modality:  Oral  Nepro Cans or Servings Per Day:  2       Frequency:  Daily  Entered: Mar 27 2025  2:01PM  
This patient has been assessed with a concern for Malnutrition and has been determined to have a diagnosis/diagnoses of Moderate protein-calorie malnutrition.    This patient is being managed with:   Diet Regular-  1000mL Fluid Restriction ([id number])  Low Sodium  Liquid Protein Supplement     Qty per Day:  2  Supplement Feeding Modality:  Oral  Nepro Cans or Servings Per Day:  2       Frequency:  Daily  Entered: Apr 8 2025  4:13PM  
This patient has been assessed with a concern for Malnutrition and has been determined to have a diagnosis/diagnoses of Moderate protein-calorie malnutrition.    This patient is being managed with:   Diet Regular-  1000mL Fluid Restriction ([id number])  Low Sodium  Liquid Protein Supplement     Qty per Day:  2  Supplement Feeding Modality:  Oral  Nepro Cans or Servings Per Day:  2       Frequency:  Daily  Entered: Apr 8 2025  4:13PM  
This patient has been assessed with a concern for Malnutrition and has been determined to have a diagnosis/diagnoses of Moderate protein-calorie malnutrition.    This patient is being managed with:   Diet Regular-  1000mL Fluid Restriction ([id number])  Low Sodium  Liquid Protein Supplement     Qty per Day:  2  Supplement Feeding Modality:  Oral  Nepro Cans or Servings Per Day:  2       Frequency:  Daily  Entered: Mar 27 2025  2:01PM  
This patient has been assessed with a concern for Malnutrition and has been determined to have a diagnosis/diagnoses of Moderate protein-calorie malnutrition.    This patient is being managed with:   Diet Regular-  1000mL Fluid Restriction ([id number])  No Concentrated Potassium  Entered: Mar  7 2025  1:54PM  
This patient has been assessed with a concern for Malnutrition and has been determined to have a diagnosis/diagnoses of Moderate protein-calorie malnutrition.    This patient is being managed with:   Diet Regular-  1000mL Fluid Restriction ([id number])  Low Sodium  Liquid Protein Supplement     Qty per Day:  2  Supplement Feeding Modality:  Oral  Nepro Cans or Servings Per Day:  2       Frequency:  Daily  Entered: Apr 8 2025  4:13PM  
This patient has been assessed with a concern for Malnutrition and has been determined to have a diagnosis/diagnoses of Moderate protein-calorie malnutrition.    This patient is being managed with:   Diet Regular-  1000mL Fluid Restriction ([id number])  No Concentrated Potassium  Entered: Mar  7 2025  1:54PM  
This patient has been assessed with a concern for Malnutrition and has been determined to have a diagnosis/diagnoses of Moderate protein-calorie malnutrition.    This patient is being managed with:   Diet Regular-  1000mL Fluid Restriction ([id number])  Low Sodium  Liquid Protein Supplement     Qty per Day:  2  Supplement Feeding Modality:  Oral  Nepro Cans or Servings Per Day:  2       Frequency:  Daily  Entered: Apr 8 2025  4:13PM  
This patient has been assessed with a concern for Malnutrition and has been determined to have a diagnosis/diagnoses of Moderate protein-calorie malnutrition.    This patient is being managed with:   Diet Regular-  1000mL Fluid Restriction ([id number])  Low Sodium  Liquid Protein Supplement     Qty per Day:  2  Supplement Feeding Modality:  Oral  Nepro Cans or Servings Per Day:  2       Frequency:  Daily  Entered: Apr 8 2025  4:13PM  
This patient has been assessed with a concern for Malnutrition and has been determined to have a diagnosis/diagnoses of Moderate protein-calorie malnutrition.    This patient is being managed with:   Diet Regular-  1000mL Fluid Restriction ([id number])  Low Sodium  No Carb Prosource TF     Qty per Day:  2  Supplement Feeding Modality:  Oral  Nepro Cans or Servings Per Day:  2       Frequency:  Daily  Entered: Mar 19 2025  9:42AM  
This patient has been assessed with a concern for Malnutrition and has been determined to have a diagnosis/diagnoses of Moderate protein-calorie malnutrition.    This patient is being managed with:   Diet Regular-  1000mL Fluid Restriction ([id number])  Low Sodium  Liquid Protein Supplement     Qty per Day:  2  Supplement Feeding Modality:  Oral  Nepro Cans or Servings Per Day:  2       Frequency:  Daily  Entered: Mar 27 2025  2:01PM  
This patient has been assessed with a concern for Malnutrition and has been determined to have a diagnosis/diagnoses of Moderate protein-calorie malnutrition.    This patient is being managed with:   Diet Regular-  1000mL Fluid Restriction ([id number])  Low Sodium  Liquid Protein Supplement     Qty per Day:  2  Supplement Feeding Modality:  Oral  Nepro Cans or Servings Per Day:  2       Frequency:  Daily  Entered: Mar 27 2025  2:01PM  
This patient has been assessed with a concern for Malnutrition and has been determined to have a diagnosis/diagnoses of Moderate protein-calorie malnutrition.    This patient is being managed with:   Diet Regular-  1000mL Fluid Restriction ([id number])  Low Sodium  Liquid Protein Supplement     Qty per Day:  2  Supplement Feeding Modality:  Oral  Nepro Cans or Servings Per Day:  2       Frequency:  Daily  Entered: Mar 27 2025  2:01PM  
This patient has been assessed with a concern for Malnutrition and has been determined to have a diagnosis/diagnoses of Moderate protein-calorie malnutrition.    This patient is being managed with:   Diet Regular-  1000mL Fluid Restriction ([id number])  Low Sodium  Liquid Protein Supplement     Qty per Day:  2  Supplement Feeding Modality:  Oral  Nepro Cans or Servings Per Day:  2       Frequency:  Daily  Entered: Apr 8 2025  4:13PM  
This patient has been assessed with a concern for Malnutrition and has been determined to have a diagnosis/diagnoses of Moderate protein-calorie malnutrition.    This patient is being managed with:   Diet Regular-  1000mL Fluid Restriction ([id number])  Low Sodium  Liquid Protein Supplement     Qty per Day:  2  Supplement Feeding Modality:  Oral  Nepro Cans or Servings Per Day:  2       Frequency:  Daily  Entered: Mar 27 2025  2:01PM  
This patient has been assessed with a concern for Malnutrition and has been determined to have a diagnosis/diagnoses of Moderate protein-calorie malnutrition.    This patient is being managed with:   Diet Renal Restrictions-  For patients receiving Renal Replacement - No Protein Restr No Conc K No Conc Phos Low Sodium  1000mL Fluid Restriction ([id number])  Entered: Mar  3 2025  2:35PM  
See above note
This patient has been assessed with a concern for Malnutrition and has been determined to have a diagnosis/diagnoses of Moderate protein-calorie malnutrition.    This patient is being managed with:   Diet NPO after Midnight-     NPO Start Date: 07-Apr-2025   NPO Start Time: 23:59  Entered: Apr 7 2025  5:38PM    Diet Regular-  1000mL Fluid Restriction ([id number])  Low Sodium  Liquid Protein Supplement     Qty per Day:  2  Supplement Feeding Modality:  Oral  Nepro Cans or Servings Per Day:  2       Frequency:  Daily  Entered: Mar 27 2025  2:01PM  
This patient has been assessed with a concern for Malnutrition and has been determined to have a diagnosis/diagnoses of Moderate protein-calorie malnutrition.    This patient is being managed with:   Diet NPO after Midnight-     NPO Start Date: 07-Apr-2025   NPO Start Time: 23:59  Entered: Apr 7 2025  5:38PM    Diet Regular-  1000mL Fluid Restriction ([id number])  Low Sodium  Liquid Protein Supplement     Qty per Day:  2  Supplement Feeding Modality:  Oral  Nepro Cans or Servings Per Day:  2       Frequency:  Daily  Entered: Mar 27 2025  2:01PM  
This patient has been assessed with a concern for Malnutrition and has been determined to have a diagnosis/diagnoses of Moderate protein-calorie malnutrition.    This patient is being managed with:   Diet NPO after Midnight-     NPO Start Date: 07-Apr-2025   NPO Start Time: 23:59  Entered: Apr 7 2025  5:38PM    Diet Regular-  1000mL Fluid Restriction ([id number])  Low Sodium  Liquid Protein Supplement     Qty per Day:  2  Supplement Feeding Modality:  Oral  Nepro Cans or Servings Per Day:  2       Frequency:  Daily  Entered: Mar 27 2025  2:01PM  
This patient has been assessed with a concern for Malnutrition and has been determined to have a diagnosis/diagnoses of Moderate protein-calorie malnutrition.    This patient is being managed with:   Diet Regular-  1000mL Fluid Restriction ([id number])  Low Sodium  No Carb Prosource TF     Qty per Day:  2  Supplement Feeding Modality:  Oral  Nepro Cans or Servings Per Day:  2       Frequency:  Daily  Entered: Mar 19 2025  9:42AM  
This patient has been assessed with a concern for Malnutrition and has been determined to have a diagnosis/diagnoses of Moderate protein-calorie malnutrition.    This patient is being managed with:   Diet Regular-  1000mL Fluid Restriction ([id number])  No Concentrated Potassium  Entered: Mar  7 2025  1:54PM  
This patient has been assessed with a concern for Malnutrition and has been determined to have a diagnosis/diagnoses of Moderate protein-calorie malnutrition.    This patient is being managed with:   Diet Regular-  1000mL Fluid Restriction ([id number])  Low Sodium  No Carb Prosource TF     Qty per Day:  2  Supplement Feeding Modality:  Oral  Nepro Cans or Servings Per Day:  2       Frequency:  Daily  Entered: Mar 19 2025  9:42AM    The following pending diet order is being considered for treatment of Moderate protein-calorie malnutrition:  Diet Regular-  1000mL Fluid Restriction ([id number])  Low Sodium  Liquid Protein Supplement     Qty per Day:  2  Supplement Feeding Modality:  Oral  Nepro Cans or Servings Per Day:  2       Frequency:  Daily  Entered: Mar 27 2025  2:01PM  
This patient has been assessed with a concern for Malnutrition and has been determined to have a diagnosis/diagnoses of Moderate protein-calorie malnutrition.    This patient is being managed with:   Diet Regular-  1000mL Fluid Restriction ([id number])  Low Sodium  No Carb Prosource TF     Qty per Day:  2  Supplement Feeding Modality:  Oral  Nepro Cans or Servings Per Day:  2       Frequency:  Daily  Entered: Mar 19 2025  9:42AM  
This patient has been assessed with a concern for Malnutrition and has been determined to have a diagnosis/diagnoses of Moderate protein-calorie malnutrition.    This patient is being managed with:   Diet Regular-  1000mL Fluid Restriction ([id number])  Low Sodium  Liquid Protein Supplement     Qty per Day:  2  Supplement Feeding Modality:  Oral  Nepro Cans or Servings Per Day:  2       Frequency:  Daily  Entered: Mar 27 2025  2:01PM  
This patient has been assessed with a concern for Malnutrition and has been determined to have a diagnosis/diagnoses of Moderate protein-calorie malnutrition.    This patient is being managed with:   Diet Regular-  1000mL Fluid Restriction ([id number])  No Concentrated Potassium  Entered: Mar  7 2025  1:54PM  
This patient has been assessed with a concern for Malnutrition and has been determined to have a diagnosis/diagnoses of Moderate protein-calorie malnutrition.    This patient is being managed with:   Diet Regular-  1000mL Fluid Restriction ([id number])  Low Sodium  Liquid Protein Supplement     Qty per Day:  2  Supplement Feeding Modality:  Oral  Nepro Cans or Servings Per Day:  2       Frequency:  Daily  Entered: Apr 8 2025  4:13PM  
This patient has been assessed with a concern for Malnutrition and has been determined to have a diagnosis/diagnoses of Moderate protein-calorie malnutrition.    This patient is being managed with:   Diet Regular-  1000mL Fluid Restriction ([id number])  Low Sodium  Liquid Protein Supplement     Qty per Day:  2  Supplement Feeding Modality:  Oral  Nepro Cans or Servings Per Day:  2       Frequency:  Daily  Entered: Apr 8 2025  4:13PM  
This patient has been assessed with a concern for Malnutrition and has been determined to have a diagnosis/diagnoses of Moderate protein-calorie malnutrition.    This patient is being managed with:   Diet Renal Restrictions-  For patients receiving Renal Replacement - No Protein Restr No Conc K No Conc Phos Low Sodium  1000mL Fluid Restriction ([id number])  Entered: Feb 20 2025  8:26AM  
This patient has been assessed with a concern for Malnutrition and has been determined to have a diagnosis/diagnoses of Moderate protein-calorie malnutrition.    This patient is being managed with:   Diet Renal Restrictions-  For patients receiving Renal Replacement - No Protein Restr No Conc K No Conc Phos Low Sodium  1000mL Fluid Restriction ([id number])  Entered: Mar  3 2025  2:35PM  
This patient has been assessed with a concern for Malnutrition and has been determined to have a diagnosis/diagnoses of Moderate protein-calorie malnutrition.    This patient is being managed with:   Diet Regular-  1000mL Fluid Restriction ([id number])  Low Sodium  Liquid Protein Supplement     Qty per Day:  2  Supplement Feeding Modality:  Oral  Nepro Cans or Servings Per Day:  2       Frequency:  Daily  Entered: Mar 27 2025  2:01PM  
This patient has been assessed with a concern for Malnutrition and has been determined to have a diagnosis/diagnoses of Moderate protein-calorie malnutrition.    This patient is being managed with:   Diet Regular-  1000mL Fluid Restriction ([id number])  Low Sodium  Liquid Protein Supplement     Qty per Day:  2  Supplement Feeding Modality:  Oral  Nepro Cans or Servings Per Day:  2       Frequency:  Daily  Entered: Mar 27 2025  2:01PM  
This patient has been assessed with a concern for Malnutrition and has been determined to have a diagnosis/diagnoses of Moderate protein-calorie malnutrition.    This patient is being managed with:   Diet Regular-  1000mL Fluid Restriction ([id number])  Low Sodium  Liquid Protein Supplement     Qty per Day:  2  Supplement Feeding Modality:  Oral  Nepro Cans or Servings Per Day:  2       Frequency:  Daily  Entered: Mar 27 2025  2:01PM  
This patient has been assessed with a concern for Malnutrition and has been determined to have a diagnosis/diagnoses of Moderate protein-calorie malnutrition.    This patient is being managed with:   Diet Regular-  1000mL Fluid Restriction ([id number])  Low Sodium  Liquid Protein Supplement     Qty per Day:  2  Supplement Feeding Modality:  Oral  Nepro Cans or Servings Per Day:  2       Frequency:  Daily  Entered: Mar 27 2025  2:01PM  
This patient has been assessed with a concern for Malnutrition and has been determined to have a diagnosis/diagnoses of Moderate protein-calorie malnutrition.    This patient is being managed with:   Diet Regular-  1000mL Fluid Restriction ([id number])  Low Sodium  Liquid Protein Supplement     Qty per Day:  2  Supplement Feeding Modality:  Oral  Nepro Cans or Servings Per Day:  2       Frequency:  Daily  Entered: Apr 8 2025  4:13PM  
This patient has been assessed with a concern for Malnutrition and has been determined to have a diagnosis/diagnoses of Moderate protein-calorie malnutrition.    This patient is being managed with:   Diet Regular-  1000mL Fluid Restriction ([id number])  Low Sodium  No Carb Prosource TF     Qty per Day:  2  Supplement Feeding Modality:  Oral  Nepro Cans or Servings Per Day:  2       Frequency:  Daily  Entered: Mar 19 2025  9:42AM  
This patient has been assessed with a concern for Malnutrition and has been determined to have a diagnosis/diagnoses of Moderate protein-calorie malnutrition.    This patient is being managed with:   Diet Regular-  1000mL Fluid Restriction ([id number])  Low Sodium  Liquid Protein Supplement     Qty per Day:  2  Supplement Feeding Modality:  Oral  Nepro Cans or Servings Per Day:  2       Frequency:  Daily  Entered: Mar 27 2025  2:01PM  
This patient has been assessed with a concern for Malnutrition and has been determined to have a diagnosis/diagnoses of Moderate protein-calorie malnutrition.    This patient is being managed with:   Diet Regular-  1000mL Fluid Restriction ([id number])  Low Sodium  Liquid Protein Supplement     Qty per Day:  2  Supplement Feeding Modality:  Oral  Nepro Cans or Servings Per Day:  2       Frequency:  Daily  Entered: Mar 27 2025  2:01PM  
This patient has been assessed with a concern for Malnutrition and has been determined to have a diagnosis/diagnoses of Moderate protein-calorie malnutrition.    This patient is being managed with:   Diet Regular-  1000mL Fluid Restriction ([id number])  No Concentrated Potassium  Entered: Mar  7 2025  1:54PM  
This patient has been assessed with a concern for Malnutrition and has been determined to have a diagnosis/diagnoses of Moderate protein-calorie malnutrition.    This patient is being managed with:   Diet Renal Restrictions-  For patients receiving Renal Replacement - No Protein Restr No Conc K No Conc Phos Low Sodium  1000mL Fluid Restriction ([id number])  Entered: Feb 20 2025  8:26AM  
This patient has been assessed with a concern for Malnutrition and has been determined to have a diagnosis/diagnoses of Moderate protein-calorie malnutrition.    This patient is being managed with:   Diet Renal Restrictions-  For patients receiving Renal Replacement - No Protein Restr No Conc K No Conc Phos Low Sodium  1000mL Fluid Restriction ([id number])  Entered: Mar  3 2025  2:35PM  
This patient has been assessed with a concern for Malnutrition and has been determined to have a diagnosis/diagnoses of Moderate protein-calorie malnutrition.    This patient is being managed with:   Diet NPO after Midnight-     NPO Start Date: 02-Mar-2025   NPO Start Time: 23:59  Entered: Mar  2 2025  9:22AM    Diet Renal Restrictions-  For patients receiving Renal Replacement - No Protein Restr No Conc K No Conc Phos Low Sodium  1000mL Fluid Restriction ([id number])  Entered: Feb 20 2025  8:26AM  
This patient has been assessed with a concern for Malnutrition and has been determined to have a diagnosis/diagnoses of Moderate protein-calorie malnutrition.    This patient is being managed with:   Diet Regular-  1000mL Fluid Restriction ([id number])  Low Sodium  Liquid Protein Supplement     Qty per Day:  2  Supplement Feeding Modality:  Oral  Nepro Cans or Servings Per Day:  2       Frequency:  Daily  Entered: Apr 8 2025  4:13PM  
This patient has been assessed with a concern for Malnutrition and has been determined to have a diagnosis/diagnoses of Moderate protein-calorie malnutrition.    This patient is being managed with:   Diet Regular-  1000mL Fluid Restriction ([id number])  Low Sodium  Liquid Protein Supplement     Qty per Day:  2  Supplement Feeding Modality:  Oral  Nepro Cans or Servings Per Day:  2       Frequency:  Daily  Entered: Apr 8 2025  4:13PM  
This patient has been assessed with a concern for Malnutrition and has been determined to have a diagnosis/diagnoses of Moderate protein-calorie malnutrition.    This patient is being managed with:   Diet Regular-  1000mL Fluid Restriction ([id number])  No Concentrated Potassium  Entered: Mar  7 2025  1:54PM  
This patient has been assessed with a concern for Malnutrition and has been determined to have a diagnosis/diagnoses of Moderate protein-calorie malnutrition.    This patient is being managed with:   Diet Regular-  1000mL Fluid Restriction ([id number])  No Concentrated Potassium  Entered: Mar  7 2025  1:54PM  
This patient has been assessed with a concern for Malnutrition and has been determined to have a diagnosis/diagnoses of Moderate protein-calorie malnutrition.    This patient is being managed with:   Diet Regular-  1000mL Fluid Restriction ([id number])  Low Sodium  Liquid Protein Supplement     Qty per Day:  2  Supplement Feeding Modality:  Oral  Nepro Cans or Servings Per Day:  2       Frequency:  Daily  Entered: Apr 8 2025  4:13PM  
This patient has been assessed with a concern for Malnutrition and has been determined to have a diagnosis/diagnoses of Moderate protein-calorie malnutrition.    This patient is being managed with:   Diet Regular-  1000mL Fluid Restriction ([id number])  Low Sodium  Liquid Protein Supplement     Qty per Day:  2  Supplement Feeding Modality:  Oral  Nepro Cans or Servings Per Day:  2       Frequency:  Daily  Entered: Mar 27 2025  2:01PM  
This patient has been assessed with a concern for Malnutrition and has been determined to have a diagnosis/diagnoses of Moderate protein-calorie malnutrition.    This patient is being managed with:   Diet Regular-  1000mL Fluid Restriction ([id number])  Low Sodium  Liquid Protein Supplement     Qty per Day:  2  Supplement Feeding Modality:  Oral  Nepro Cans or Servings Per Day:  2       Frequency:  Daily  Entered: Mar 27 2025  2:01PM  
This patient has been assessed with a concern for Malnutrition and has been determined to have a diagnosis/diagnoses of Moderate protein-calorie malnutrition.    This patient is being managed with:   Diet Regular-  1000mL Fluid Restriction ([id number])  Low Sodium  Liquid Protein Supplement     Qty per Day:  2  Supplement Feeding Modality:  Oral  Nepro Cans or Servings Per Day:  2       Frequency:  Daily  Entered: Mar 27 2025  2:01PM  
This patient has been assessed with a concern for Malnutrition and has been determined to have a diagnosis/diagnoses of Moderate protein-calorie malnutrition.    This patient is being managed with:   Diet Regular-  1000mL Fluid Restriction ([id number])  Low Sodium  Liquid Protein Supplement     Qty per Day:  2  Supplement Feeding Modality:  Oral  Nepro Cans or Servings Per Day:  2       Frequency:  Daily  Entered: Mar 27 2025  2:01PM  
This patient has been assessed with a concern for Malnutrition and has been determined to have a diagnosis/diagnoses of Moderate protein-calorie malnutrition.    This patient is being managed with:   Diet Renal Restrictions-  For patients receiving Renal Replacement - No Protein Restr No Conc K No Conc Phos Low Sodium  1000mL Fluid Restriction ([id number])  Entered: Mar  3 2025  2:35PM  
This patient has been assessed with a concern for Malnutrition and has been determined to have a diagnosis/diagnoses of Moderate protein-calorie malnutrition.    This patient is being managed with:   Diet Renal Restrictions-  For patients receiving Renal Replacement - No Protein Restr No Conc K No Conc Phos Low Sodium  1000mL Fluid Restriction ([id number])  Entered: Mar  3 2025  2:35PM

## 2025-04-17 NOTE — PROGRESS NOTE ADULT - PROBLEM SELECTOR PLAN 1
multiple wounds: largest sacral wound s/p debridement on 4/8  - IV Dilaudid 0.5 mg q2hrs PRN for moderate pain  - IV Dilaudid 1 mg q2hrs PRN for severe pain   - Bowel regimen while on opioids.  Monitor for constipation.

## 2025-04-17 NOTE — PROGRESS NOTE ADULT - PROBLEM SELECTOR PLAN 1
4/12: resolved:  his last cxr is clear:  4/14:  developed some productive cough today : no sob:  no wheezing:   cont antibiotics per ID:  pulm wise on room air:   aspiration precautions:       4/13: no new resp symptoms:  has pain in wound  always on bed:  high risk for pneumonia dw pt and his wife at bedside    4/15today he looks pretty weak : bed bound  has sacral decubiti ; and gangrenous digits:  pulm wise he seems OK:  has some dry cough : but his last chest xray was clear:  no antibiotics needed from pulm point of view:    4/16: he is doing very poorly:   has lot of secretions which he is unable to expectorate   rpt ct c hest noted:  started on meropenem and daptomycin : id following:  not a very good prognosis    4/17: pt is doing very poorly:  dnr and DNI and now comfort care : on 100% NRB :  pt is actively dying:  whole family is at bedside;

## 2025-04-17 NOTE — PROGRESS NOTE ADULT - ASSESSMENT
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 The initial plan was that he would present to the [facility] ER Monday 2/17 for HD initiation. However, day of admission, he fell and sustained trauma to his knee. Called to consult for cough, elevated R hemidiaphragm.

## 2025-04-17 NOTE — PROGRESS NOTE ADULT - REASON FOR ADMISSION
ESRD requiring HD, uremia

## 2025-04-17 NOTE — PROGRESS NOTE ADULT - ASSESSMENT
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  came in after fall and for HD.     2/20 CTH neg   123 --> now 133   A1c 5.7   TSH WNL 3.46   o/e 2/21 AAOx2, uppers 4/5, lowers limited .  2/23; family bedside upset that he has pain in leg after he was moved.  patient going for imaging now.   s/p R IJ permacath by IR 2/25 2/27 AAOx3   sopoke with family bedisde 3/17 wife says mental status okay, sometimes up and down but good   3/18 was told he has "new neuropathy" spoke ghulam patient and wife at bedside. states its the same from before not a new issue.    3/21 mental status stable. c/o pubic cathter   3/23 c/o nausea today   no new changes   4/3 neuro intact. LE same.  c/o Nausea   s/p sacral wound debridment   4/15 recontacted regarding abnormal brief jerks without impairment of mental status  4/17 decided on comfort focused care last night. DNR/I. palliative following.  patient with agonal breathing.  pending PCU.      Impression  1) brief jerks, nonrhythmic, nonsustained, that can happen all over body, no impairment of mental status during jerks (although does have fluctuations in mental status, possibly from sedating medications). Clinically suspicious for myoclonus, could be i/s/o of multiple toxic metabolic infectious processes including renal failure requiring HD. However, will r/o seizure activity.  2) AMS, waxing and waing , likely multifactorial from infection (WBC 16.69), metabolic/electrolyte derrangements/ delerium / medication effect, uremia which is imporving   3) polio, chronic weakness  4) fall 2/2 weakness  5) hyponatremia to 123 2/20  improved 133 -->136;   back down to 129 -- 133   6) actively dying      - f/u palliaitve and possible hospice --> plan for PCU.  no comfort care   - neprho rec to dc HD given fraility and no improvement    - comfort directed care  - dilauded PRN   spoke with wife and daughter 4/17   all qeustions answered   DNR/I   Paul Limon MD  Vascular Neurology  Office: [phone]

## 2025-04-18 NOTE — CHART NOTE - NSCHARTNOTEFT_GEN_A_CORE
MEDICINE PA    Permacath removed by vascular surgery at bedside as per family's request.    Ad Ngo PA-C  Dept. of Medicine

## 2025-04-18 NOTE — CHART NOTE - NSCHARTNOTESELECT_GEN_ALL_CORE
Event Note
Interventional Radiology
Interventional Radiology/Event Note
Medicine NP/Event Note
Nutrition Services
Nutrition Services
Orthopaedics/Event Note
Palliative care team/Event Note
Palliative/Event Note
Surgery
Vascular surgery update
comfort measures/Event Note
Event Note
Event Note
Interventional Radiology/Off Service Note
Nutrition Services
POC
Pain management/Event Note
S/P RRT for AMS/Event Note
Urology/Event Note
Wound Eval/Event Note
dusky toes bilaterally/Event Note
initiate heparin gtt/Event Note
post op check/Event Note
s/p RRT/Event Note

## 2025-05-07 LAB
CULTURE RESULTS: SIGNIFICANT CHANGE UP
SPECIMEN SOURCE: SIGNIFICANT CHANGE UP

## (undated) DEVICE — VENODYNE/SCD SLEEVE CALF MEDIUM

## (undated) DEVICE — DRAPE MAYO STAND 30"

## (undated) DEVICE — CLAMP BULLDOG MAXI 45 DEGREE (ORANGE) DISP

## (undated) DEVICE — DRSG STERISTRIPS 0.5 X 4"

## (undated) DEVICE — SUT SOFSILK 2-0 18" TIES

## (undated) DEVICE — PREP CHLORAPREP HI-LITE ORANGE 26ML

## (undated) DEVICE — CANISTER W/GEL INFOVAC 1000ML

## (undated) DEVICE — SUT SOFSILK 4-0 18" TIES

## (undated) DEVICE — DRSG COMBINE 5 X 9"

## (undated) DEVICE — DRSG OPSITE 13.75 X 4"

## (undated) DEVICE — SOL IRR BAG NS 0.9% 3000ML

## (undated) DEVICE — DRSG VAC GRANUFOAM MEDIUM (BLACK)

## (undated) DEVICE — DRAPE INSTRUMENT POUCH 6.75" X 11"

## (undated) DEVICE — SYR TB 1CC 25G X 5/8 (BLUE)

## (undated) DEVICE — SUT SOFSILK 0 18" TIES

## (undated) DEVICE — WARMING BLANKET LOWER ADULT

## (undated) DEVICE — SUT PROLENE 6-0 4-30" BV-1

## (undated) DEVICE — PACK MINOR

## (undated) DEVICE — SUT SOFSILK 3-0 30" TIES

## (undated) DEVICE — GLV 7.5 PROTEXIS (WHITE)

## (undated) DEVICE — DRSG TEGADERM 8 X 12"

## (undated) DEVICE — DRAIN JACKSON PRATT 7MM FLAT FULL NO TROCAR

## (undated) DEVICE — DRAPE 1/2 SHEET 40X57"

## (undated) DEVICE — DRSG XEROFORM 5 X 9"

## (undated) DEVICE — PACK FEM/POP

## (undated) DEVICE — SUT BIOSYN 4-0 18" P-12

## (undated) DEVICE — STOPCOCK 4-WAY W SWIVEL MALE LUER LOCK NON VENTED RED CAP

## (undated) DEVICE — GLV 7 PROTEXIS (WHITE)

## (undated) DEVICE — GOWN XL

## (undated) DEVICE — SOL IRR POUR H2O 250ML

## (undated) DEVICE — STRYKER PULSE LAVAGE WITH HIGH FLOW TIP

## (undated) DEVICE — BLADE SCALPEL SAFETYLOCK #10

## (undated) DEVICE — SOL IRR POUR NS 0.9% 500ML

## (undated) DEVICE — TUNNELER SHEATH GREEN LARGE

## (undated) DEVICE — SPECIMEN CONTAINER 100ML

## (undated) DEVICE — CANISTER W/GEL INFOVAC 500ML

## (undated) DEVICE — POSITIONER FOAM EGG CRATE ULNAR 2PCS (PINK)

## (undated) DEVICE — GOWN TRIMAX LG

## (undated) DEVICE — PREP BETADINE KIT

## (undated) DEVICE — ELCTR BOVIE PENCIL SMOKE EVACUATION

## (undated) DEVICE — INFLATION DEVICE BASIXCOMPAK

## (undated) DEVICE — SYR CONTRAST 10ML

## (undated) DEVICE — DRAPE 3/4 SHEET W REINFORCEMENT 56X77"

## (undated) DEVICE — SYR ASEPTO

## (undated) DEVICE — DRAPE TOWEL BLUE 17" X 24"

## (undated) DEVICE — BLADE SCALPEL SAFETYLOCK #15

## (undated) DEVICE — BULLDOG SPRING CLIP 6MM SOFT/SOFT

## (undated) DEVICE — WARMING BLANKET UPPER ADULT

## (undated) DEVICE — DRAPE MAGNETIC INSTRUMENT MEDIUM

## (undated) DEVICE — GLV 6.5 PROTEXIS (WHITE)

## (undated) DEVICE — DRAPE SPLIT SHEET 77" X 108"

## (undated) DEVICE — DRSG VAC GRANUFOAM SMALL (BLACK)

## (undated) DEVICE — DRAIN RESERVOIR FOR JACKSON PRATT 100CC CARDINAL

## (undated) DEVICE — SUCTION YANKAUER NO CONTROL VENT

## (undated) DEVICE — SUT PROLENE 5-0 30" RB-2

## (undated) DEVICE — DRSG XEROFORM 1 X 8"

## (undated) DEVICE — DRSG VAC GRANUFOAM LARGE (BLACK)

## (undated) DEVICE — GLV 8.5 PROTEXIS (WHITE)

## (undated) DEVICE — FOLEY TRAY 16FR 5CC LTX UMETER CLOSED

## (undated) DEVICE — CANISTER KCI 500ML GEL SENSA TRAC

## (undated) DEVICE — GLV 8 PROTEXIS (WHITE)

## (undated) DEVICE — ELCTR BVI ACCU-TEMP CAUTERY SHAFT FINE TIP 1/2"

## (undated) DEVICE — TORQUE DEVICE FOR GUIDEWIRE 0.0100.038"

## (undated) DEVICE — DRAIN JACKSON PRATT 10MM FLAT FULL NO TROCAR

## (undated) DEVICE — SUT PROLENE 6-0 4-30" C-1

## (undated) DEVICE — DRSG TEGADERM 6 X 8"

## (undated) DEVICE — SUT PROLENE 7-0 4-24" BV-1

## (undated) DEVICE — SUT POLYSORB 2-0 30" GS-21 UNDYED

## (undated) DEVICE — DRSG KLING 6"

## (undated) DEVICE — SOL IRR BAG NS 0.9% 1000ML

## (undated) DEVICE — STAPLER SKIN VISI-STAT 35 WIDE

## (undated) DEVICE — MEDICATION LABELS W MARKER

## (undated) DEVICE — DRAPE IOBAN 23" X 23"